# Patient Record
Sex: MALE | Race: WHITE | NOT HISPANIC OR LATINO | URBAN - METROPOLITAN AREA
[De-identification: names, ages, dates, MRNs, and addresses within clinical notes are randomized per-mention and may not be internally consistent; named-entity substitution may affect disease eponyms.]

---

## 2017-03-31 ENCOUNTER — INPATIENT (INPATIENT)
Facility: HOSPITAL | Age: 57
LOS: 9 days | Discharge: HOME | End: 2017-04-10
Attending: SURGERY

## 2017-04-03 ENCOUNTER — APPOINTMENT (OUTPATIENT)
Dept: VASCULAR SURGERY | Facility: HOSPITAL | Age: 57
End: 2017-04-03

## 2017-04-04 PROBLEM — Z00.00 ENCOUNTER FOR PREVENTIVE HEALTH EXAMINATION: Status: ACTIVE | Noted: 2017-04-04

## 2017-04-05 ENCOUNTER — APPOINTMENT (OUTPATIENT)
Dept: VASCULAR SURGERY | Facility: HOSPITAL | Age: 57
End: 2017-04-05

## 2017-04-25 ENCOUNTER — APPOINTMENT (OUTPATIENT)
Dept: VASCULAR SURGERY | Facility: CLINIC | Age: 57
End: 2017-04-25

## 2017-04-25 VITALS
SYSTOLIC BLOOD PRESSURE: 124 MMHG | BODY MASS INDEX: 31.92 KG/M2 | HEIGHT: 71 IN | WEIGHT: 228 LBS | DIASTOLIC BLOOD PRESSURE: 74 MMHG

## 2017-04-25 DIAGNOSIS — Z86.79 PERSONAL HISTORY OF OTHER DISEASES OF THE CIRCULATORY SYSTEM: ICD-10-CM

## 2017-04-25 DIAGNOSIS — Z78.9 OTHER SPECIFIED HEALTH STATUS: ICD-10-CM

## 2017-04-25 DIAGNOSIS — Z86.39 PERSONAL HISTORY OF OTHER ENDOCRINE, NUTRITIONAL AND METABOLIC DISEASE: ICD-10-CM

## 2017-04-25 DIAGNOSIS — I25.10 ATHEROSCLEROTIC HEART DISEASE OF NATIVE CORONARY ARTERY W/OUT ANGINA PECTORIS: ICD-10-CM

## 2017-04-25 DIAGNOSIS — Z83.3 FAMILY HISTORY OF DIABETES MELLITUS: ICD-10-CM

## 2017-04-25 RX ORDER — CLOPIDOGREL BISULFATE 75 MG/1
75 TABLET, FILM COATED ORAL
Refills: 0 | Status: ACTIVE | COMMUNITY

## 2017-05-09 ENCOUNTER — APPOINTMENT (OUTPATIENT)
Dept: VASCULAR SURGERY | Facility: CLINIC | Age: 57
End: 2017-05-09

## 2017-05-19 ENCOUNTER — OUTPATIENT (OUTPATIENT)
Dept: OUTPATIENT SERVICES | Facility: HOSPITAL | Age: 57
LOS: 1 days | Discharge: HOME | End: 2017-05-19

## 2017-06-01 ENCOUNTER — INPATIENT (INPATIENT)
Facility: HOSPITAL | Age: 57
LOS: 0 days | Discharge: HOME | End: 2017-06-02
Attending: SURGERY

## 2017-06-01 ENCOUNTER — APPOINTMENT (OUTPATIENT)
Dept: VASCULAR SURGERY | Facility: HOSPITAL | Age: 57
End: 2017-06-01

## 2017-06-01 DIAGNOSIS — I73.9 PERIPHERAL VASCULAR DISEASE, UNSPECIFIED: ICD-10-CM

## 2017-06-01 DIAGNOSIS — I65.29 OCCLUSION AND STENOSIS OF UNSPECIFIED CAROTID ARTERY: ICD-10-CM

## 2017-06-01 DIAGNOSIS — I10 ESSENTIAL (PRIMARY) HYPERTENSION: ICD-10-CM

## 2017-06-01 DIAGNOSIS — E11.621 TYPE 2 DIABETES MELLITUS WITH FOOT ULCER: ICD-10-CM

## 2017-06-01 DIAGNOSIS — I25.10 ATHEROSCLEROTIC HEART DISEASE OF NATIVE CORONARY ARTERY WITHOUT ANGINA PECTORIS: ICD-10-CM

## 2017-06-01 DIAGNOSIS — E11.9 TYPE 2 DIABETES MELLITUS WITHOUT COMPLICATIONS: ICD-10-CM

## 2017-06-20 ENCOUNTER — APPOINTMENT (OUTPATIENT)
Dept: VASCULAR SURGERY | Facility: CLINIC | Age: 57
End: 2017-06-20

## 2017-06-27 ENCOUNTER — APPOINTMENT (OUTPATIENT)
Dept: VASCULAR SURGERY | Facility: CLINIC | Age: 57
End: 2017-06-27

## 2017-06-28 DIAGNOSIS — Z79.02 LONG TERM (CURRENT) USE OF ANTITHROMBOTICS/ANTIPLATELETS: ICD-10-CM

## 2017-06-28 DIAGNOSIS — I65.21 OCCLUSION AND STENOSIS OF RIGHT CAROTID ARTERY: ICD-10-CM

## 2017-06-28 DIAGNOSIS — Z86.73 PERSONAL HISTORY OF TRANSIENT ISCHEMIC ATTACK (TIA), AND CEREBRAL INFARCTION WITHOUT RESIDUAL DEFICITS: ICD-10-CM

## 2017-06-28 DIAGNOSIS — Z79.82 LONG TERM (CURRENT) USE OF ASPIRIN: ICD-10-CM

## 2017-06-28 DIAGNOSIS — I73.9 PERIPHERAL VASCULAR DISEASE, UNSPECIFIED: ICD-10-CM

## 2017-06-28 DIAGNOSIS — I25.10 ATHEROSCLEROTIC HEART DISEASE OF NATIVE CORONARY ARTERY WITHOUT ANGINA PECTORIS: ICD-10-CM

## 2017-06-28 DIAGNOSIS — I25.2 OLD MYOCARDIAL INFARCTION: ICD-10-CM

## 2017-06-28 DIAGNOSIS — Z79.4 LONG TERM (CURRENT) USE OF INSULIN: ICD-10-CM

## 2017-06-28 DIAGNOSIS — E78.5 HYPERLIPIDEMIA, UNSPECIFIED: ICD-10-CM

## 2017-06-28 DIAGNOSIS — Z89.432 ACQUIRED ABSENCE OF LEFT FOOT: ICD-10-CM

## 2017-06-28 DIAGNOSIS — E83.42 HYPOMAGNESEMIA: ICD-10-CM

## 2017-06-28 DIAGNOSIS — E11.51 TYPE 2 DIABETES MELLITUS WITH DIABETIC PERIPHERAL ANGIOPATHY WITHOUT GANGRENE: ICD-10-CM

## 2017-06-28 DIAGNOSIS — I10 ESSENTIAL (PRIMARY) HYPERTENSION: ICD-10-CM

## 2017-06-28 DIAGNOSIS — Z95.1 PRESENCE OF AORTOCORONARY BYPASS GRAFT: ICD-10-CM

## 2017-06-28 DIAGNOSIS — Z98.890 OTHER SPECIFIED POSTPROCEDURAL STATES: ICD-10-CM

## 2017-07-25 ENCOUNTER — APPOINTMENT (OUTPATIENT)
Dept: VASCULAR SURGERY | Facility: CLINIC | Age: 57
End: 2017-07-25

## 2017-07-25 VITALS — HEIGHT: 71 IN | BODY MASS INDEX: 31.92 KG/M2 | WEIGHT: 228 LBS

## 2017-10-10 ENCOUNTER — APPOINTMENT (OUTPATIENT)
Dept: VASCULAR SURGERY | Facility: CLINIC | Age: 57
End: 2017-10-10

## 2017-10-31 ENCOUNTER — APPOINTMENT (OUTPATIENT)
Dept: VASCULAR SURGERY | Facility: CLINIC | Age: 57
End: 2017-10-31
Payer: COMMERCIAL

## 2017-10-31 VITALS — DIASTOLIC BLOOD PRESSURE: 70 MMHG | WEIGHT: 210 LBS | BODY MASS INDEX: 29.29 KG/M2 | SYSTOLIC BLOOD PRESSURE: 130 MMHG

## 2017-10-31 PROCEDURE — 99213 OFFICE O/P EST LOW 20 MIN: CPT

## 2017-10-31 PROCEDURE — 93926 LOWER EXTREMITY STUDY: CPT

## 2018-01-30 ENCOUNTER — APPOINTMENT (OUTPATIENT)
Dept: VASCULAR SURGERY | Facility: CLINIC | Age: 58
End: 2018-01-30
Payer: COMMERCIAL

## 2018-01-30 VITALS
BODY MASS INDEX: 29.4 KG/M2 | SYSTOLIC BLOOD PRESSURE: 140 MMHG | DIASTOLIC BLOOD PRESSURE: 80 MMHG | HEIGHT: 71 IN | WEIGHT: 210 LBS

## 2018-01-30 PROCEDURE — 93880 EXTRACRANIAL BILAT STUDY: CPT

## 2018-01-30 PROCEDURE — 99213 OFFICE O/P EST LOW 20 MIN: CPT

## 2018-01-30 PROCEDURE — 93926 LOWER EXTREMITY STUDY: CPT

## 2018-04-05 ENCOUNTER — OUTPATIENT (OUTPATIENT)
Dept: OUTPATIENT SERVICES | Facility: HOSPITAL | Age: 58
LOS: 1 days | Discharge: HOME | End: 2018-04-05

## 2018-04-05 DIAGNOSIS — K76.89 OTHER SPECIFIED DISEASES OF LIVER: ICD-10-CM

## 2018-04-05 DIAGNOSIS — N39.0 URINARY TRACT INFECTION, SITE NOT SPECIFIED: ICD-10-CM

## 2018-04-05 DIAGNOSIS — E78.00 PURE HYPERCHOLESTEROLEMIA, UNSPECIFIED: ICD-10-CM

## 2018-04-05 DIAGNOSIS — E03.9 HYPOTHYROIDISM, UNSPECIFIED: ICD-10-CM

## 2018-04-05 DIAGNOSIS — E11.65 TYPE 2 DIABETES MELLITUS WITH HYPERGLYCEMIA: ICD-10-CM

## 2018-04-05 DIAGNOSIS — I10 ESSENTIAL (PRIMARY) HYPERTENSION: ICD-10-CM

## 2018-04-05 DIAGNOSIS — E55.9 VITAMIN D DEFICIENCY, UNSPECIFIED: ICD-10-CM

## 2018-04-05 DIAGNOSIS — E78.5 HYPERLIPIDEMIA, UNSPECIFIED: ICD-10-CM

## 2018-04-05 DIAGNOSIS — D64.9 ANEMIA, UNSPECIFIED: ICD-10-CM

## 2018-04-24 ENCOUNTER — APPOINTMENT (OUTPATIENT)
Dept: VASCULAR SURGERY | Facility: CLINIC | Age: 58
End: 2018-04-24
Payer: COMMERCIAL

## 2018-04-24 VITALS
HEIGHT: 71 IN | SYSTOLIC BLOOD PRESSURE: 132 MMHG | DIASTOLIC BLOOD PRESSURE: 84 MMHG | WEIGHT: 210 LBS | BODY MASS INDEX: 29.4 KG/M2

## 2018-04-24 PROCEDURE — 93925 LOWER EXTREMITY STUDY: CPT

## 2018-04-24 PROCEDURE — 99213 OFFICE O/P EST LOW 20 MIN: CPT

## 2018-07-03 DIAGNOSIS — I65.23 OCCLUSION AND STENOSIS OF BILATERAL CAROTID ARTERIES: ICD-10-CM

## 2018-07-03 DIAGNOSIS — Z01.818 ENCOUNTER FOR OTHER PREPROCEDURAL EXAMINATION: ICD-10-CM

## 2018-08-14 ENCOUNTER — APPOINTMENT (OUTPATIENT)
Dept: UROLOGY | Facility: CLINIC | Age: 58
End: 2018-08-14

## 2018-08-21 ENCOUNTER — APPOINTMENT (OUTPATIENT)
Dept: VASCULAR SURGERY | Facility: CLINIC | Age: 58
End: 2018-08-21
Payer: COMMERCIAL

## 2018-08-21 PROCEDURE — 93926 LOWER EXTREMITY STUDY: CPT

## 2018-08-21 PROCEDURE — 99213 OFFICE O/P EST LOW 20 MIN: CPT

## 2018-08-23 ENCOUNTER — OUTPATIENT (OUTPATIENT)
Dept: OUTPATIENT SERVICES | Facility: HOSPITAL | Age: 58
LOS: 1 days | Discharge: HOME | End: 2018-08-23

## 2018-08-23 DIAGNOSIS — E78.5 HYPERLIPIDEMIA, UNSPECIFIED: ICD-10-CM

## 2018-08-23 DIAGNOSIS — E11.65 TYPE 2 DIABETES MELLITUS WITH HYPERGLYCEMIA: ICD-10-CM

## 2018-08-23 DIAGNOSIS — E78.00 PURE HYPERCHOLESTEROLEMIA, UNSPECIFIED: ICD-10-CM

## 2018-08-23 DIAGNOSIS — I10 ESSENTIAL (PRIMARY) HYPERTENSION: ICD-10-CM

## 2018-08-23 DIAGNOSIS — D64.9 ANEMIA, UNSPECIFIED: ICD-10-CM

## 2018-08-23 DIAGNOSIS — N39.0 URINARY TRACT INFECTION, SITE NOT SPECIFIED: ICD-10-CM

## 2018-08-23 DIAGNOSIS — E55.9 VITAMIN D DEFICIENCY, UNSPECIFIED: ICD-10-CM

## 2018-08-25 ENCOUNTER — INPATIENT (INPATIENT)
Facility: HOSPITAL | Age: 58
LOS: 2 days | Discharge: HOME | End: 2018-08-28
Attending: INTERNAL MEDICINE | Admitting: INTERNAL MEDICINE
Payer: COMMERCIAL

## 2018-08-25 VITALS
DIASTOLIC BLOOD PRESSURE: 79 MMHG | TEMPERATURE: 97 F | SYSTOLIC BLOOD PRESSURE: 141 MMHG | WEIGHT: 197.31 LBS | HEART RATE: 90 BPM | RESPIRATION RATE: 18 BRPM | OXYGEN SATURATION: 99 %

## 2018-08-25 DIAGNOSIS — Z95.1 PRESENCE OF AORTOCORONARY BYPASS GRAFT: Chronic | ICD-10-CM

## 2018-08-25 LAB
ALBUMIN SERPL ELPH-MCNC: 3.6 G/DL — SIGNIFICANT CHANGE UP (ref 3.5–5.2)
ALP SERPL-CCNC: 111 U/L — SIGNIFICANT CHANGE UP (ref 30–115)
ALT FLD-CCNC: 9 U/L — SIGNIFICANT CHANGE UP (ref 0–41)
ANION GAP SERPL CALC-SCNC: 14 MMOL/L — SIGNIFICANT CHANGE UP (ref 7–14)
APPEARANCE UR: CLEAR — SIGNIFICANT CHANGE UP
APTT BLD: 33.8 SEC — SIGNIFICANT CHANGE UP (ref 27–39.2)
AST SERPL-CCNC: 15 U/L — SIGNIFICANT CHANGE UP (ref 0–41)
BACTERIA # UR AUTO: ABNORMAL /HPF
BASOPHILS # BLD AUTO: 0.08 K/UL — SIGNIFICANT CHANGE UP (ref 0–0.2)
BASOPHILS NFR BLD AUTO: 0.8 % — SIGNIFICANT CHANGE UP (ref 0–1)
BILIRUB SERPL-MCNC: 0.2 MG/DL — SIGNIFICANT CHANGE UP (ref 0.2–1.2)
BILIRUB UR-MCNC: ABNORMAL
BUN SERPL-MCNC: 37 MG/DL — HIGH (ref 10–20)
CALCIUM SERPL-MCNC: 8.6 MG/DL — SIGNIFICANT CHANGE UP (ref 8.5–10.1)
CHLORIDE SERPL-SCNC: 101 MMOL/L — SIGNIFICANT CHANGE UP (ref 98–110)
CK MB CFR SERPL CALC: 3.5 NG/ML — SIGNIFICANT CHANGE UP (ref 0.6–6.3)
CK SERPL-CCNC: 97 U/L — SIGNIFICANT CHANGE UP (ref 0–225)
CO2 SERPL-SCNC: 25 MMOL/L — SIGNIFICANT CHANGE UP (ref 17–32)
COLOR SPEC: YELLOW — SIGNIFICANT CHANGE UP
CREAT SERPL-MCNC: 2 MG/DL — HIGH (ref 0.7–1.5)
DIFF PNL FLD: ABNORMAL
EOSINOPHIL # BLD AUTO: 0.18 K/UL — SIGNIFICANT CHANGE UP (ref 0–0.7)
EOSINOPHIL NFR BLD AUTO: 1.8 % — SIGNIFICANT CHANGE UP (ref 0–8)
GLUCOSE BLDC GLUCOMTR-MCNC: 154 MG/DL — HIGH (ref 70–99)
GLUCOSE SERPL-MCNC: 130 MG/DL — HIGH (ref 70–99)
GLUCOSE UR QL: 100 MG/DL
HCT VFR BLD CALC: 35.6 % — LOW (ref 42–52)
HGB BLD-MCNC: 11.8 G/DL — LOW (ref 14–18)
IMM GRANULOCYTES NFR BLD AUTO: 0.3 % — SIGNIFICANT CHANGE UP (ref 0.1–0.3)
INR BLD: 1.07 RATIO — SIGNIFICANT CHANGE UP (ref 0.65–1.3)
KETONES UR-MCNC: ABNORMAL
LACTATE SERPL-SCNC: 2.2 MMOL/L — SIGNIFICANT CHANGE UP (ref 0.5–2.2)
LEUKOCYTE ESTERASE UR-ACNC: NEGATIVE — SIGNIFICANT CHANGE UP
LYMPHOCYTES # BLD AUTO: 2.29 K/UL — SIGNIFICANT CHANGE UP (ref 1.2–3.4)
LYMPHOCYTES # BLD AUTO: 23.5 % — SIGNIFICANT CHANGE UP (ref 20.5–51.1)
MAGNESIUM SERPL-MCNC: 1.7 MG/DL — LOW (ref 1.8–2.4)
MCHC RBC-ENTMCNC: 28.6 PG — SIGNIFICANT CHANGE UP (ref 27–31)
MCHC RBC-ENTMCNC: 33.1 G/DL — SIGNIFICANT CHANGE UP (ref 32–37)
MCV RBC AUTO: 86.4 FL — SIGNIFICANT CHANGE UP (ref 80–94)
MONOCYTES # BLD AUTO: 0.69 K/UL — HIGH (ref 0.1–0.6)
MONOCYTES NFR BLD AUTO: 7.1 % — SIGNIFICANT CHANGE UP (ref 1.7–9.3)
NEUTROPHILS # BLD AUTO: 6.49 K/UL — SIGNIFICANT CHANGE UP (ref 1.4–6.5)
NEUTROPHILS NFR BLD AUTO: 66.5 % — SIGNIFICANT CHANGE UP (ref 42.2–75.2)
NITRITE UR-MCNC: NEGATIVE — SIGNIFICANT CHANGE UP
NRBC # BLD: 0 /100 WBCS — SIGNIFICANT CHANGE UP (ref 0–0)
PH UR: 5.5 — SIGNIFICANT CHANGE UP (ref 5–8)
PLATELET # BLD AUTO: 214 K/UL — SIGNIFICANT CHANGE UP (ref 130–400)
POTASSIUM SERPL-MCNC: 4.2 MMOL/L — SIGNIFICANT CHANGE UP (ref 3.5–5)
POTASSIUM SERPL-SCNC: 4.2 MMOL/L — SIGNIFICANT CHANGE UP (ref 3.5–5)
PROT SERPL-MCNC: 6.3 G/DL — SIGNIFICANT CHANGE UP (ref 6–8)
PROT UR-MCNC: >=300 MG/DL
PROTHROM AB SERPL-ACNC: 11.6 SEC — SIGNIFICANT CHANGE UP (ref 9.95–12.87)
RBC # BLD: 4.12 M/UL — LOW (ref 4.7–6.1)
RBC # FLD: 12.7 % — SIGNIFICANT CHANGE UP (ref 11.5–14.5)
SODIUM SERPL-SCNC: 140 MMOL/L — SIGNIFICANT CHANGE UP (ref 135–146)
SP GR SPEC: 1.02 — SIGNIFICANT CHANGE UP (ref 1.01–1.03)
TROPONIN T SERPL-MCNC: 0.06 NG/ML — CRITICAL HIGH
UROBILINOGEN FLD QL: 1 MG/DL (ref 0.2–0.2)
WBC # BLD: 9.76 K/UL — SIGNIFICANT CHANGE UP (ref 4.8–10.8)
WBC # FLD AUTO: 9.76 K/UL — SIGNIFICANT CHANGE UP (ref 4.8–10.8)
WBC UR QL: SIGNIFICANT CHANGE UP /HPF

## 2018-08-25 PROCEDURE — 93880 EXTRACRANIAL BILAT STUDY: CPT | Mod: 26

## 2018-08-25 RX ORDER — INSULIN LISPRO 100/ML
VIAL (ML) SUBCUTANEOUS
Qty: 0 | Refills: 0 | Status: DISCONTINUED | OUTPATIENT
Start: 2018-08-25 | End: 2018-08-28

## 2018-08-25 RX ORDER — GLUCAGON INJECTION, SOLUTION 0.5 MG/.1ML
1 INJECTION, SOLUTION SUBCUTANEOUS ONCE
Qty: 0 | Refills: 0 | Status: DISCONTINUED | OUTPATIENT
Start: 2018-08-25 | End: 2018-08-28

## 2018-08-25 RX ORDER — LOSARTAN POTASSIUM 100 MG/1
50 TABLET, FILM COATED ORAL DAILY
Qty: 0 | Refills: 0 | Status: DISCONTINUED | OUTPATIENT
Start: 2018-08-25 | End: 2018-08-28

## 2018-08-25 RX ORDER — ASPIRIN/CALCIUM CARB/MAGNESIUM 324 MG
81 TABLET ORAL DAILY
Qty: 0 | Refills: 0 | Status: DISCONTINUED | OUTPATIENT
Start: 2018-08-25 | End: 2018-08-28

## 2018-08-25 RX ORDER — ATORVASTATIN CALCIUM 80 MG/1
80 TABLET, FILM COATED ORAL AT BEDTIME
Qty: 0 | Refills: 0 | Status: DISCONTINUED | OUTPATIENT
Start: 2018-08-25 | End: 2018-08-25

## 2018-08-25 RX ORDER — ATORVASTATIN CALCIUM 80 MG/1
40 TABLET, FILM COATED ORAL AT BEDTIME
Qty: 0 | Refills: 0 | Status: DISCONTINUED | OUTPATIENT
Start: 2018-08-25 | End: 2018-08-28

## 2018-08-25 RX ORDER — DEXTROSE 50 % IN WATER 50 %
12.5 SYRINGE (ML) INTRAVENOUS ONCE
Qty: 0 | Refills: 0 | Status: DISCONTINUED | OUTPATIENT
Start: 2018-08-25 | End: 2018-08-28

## 2018-08-25 RX ORDER — DEXTROSE 50 % IN WATER 50 %
25 SYRINGE (ML) INTRAVENOUS ONCE
Qty: 0 | Refills: 0 | Status: DISCONTINUED | OUTPATIENT
Start: 2018-08-25 | End: 2018-08-28

## 2018-08-25 RX ORDER — SODIUM CHLORIDE 9 MG/ML
1000 INJECTION, SOLUTION INTRAVENOUS
Qty: 0 | Refills: 0 | Status: DISCONTINUED | OUTPATIENT
Start: 2018-08-25 | End: 2018-08-28

## 2018-08-25 RX ORDER — INSULIN GLARGINE 100 [IU]/ML
27 INJECTION, SOLUTION SUBCUTANEOUS AT BEDTIME
Qty: 0 | Refills: 0 | Status: DISCONTINUED | OUTPATIENT
Start: 2018-08-25 | End: 2018-08-28

## 2018-08-25 RX ORDER — HEPARIN SODIUM 5000 [USP'U]/ML
5000 INJECTION INTRAVENOUS; SUBCUTANEOUS EVERY 8 HOURS
Qty: 0 | Refills: 0 | Status: DISCONTINUED | OUTPATIENT
Start: 2018-08-25 | End: 2018-08-28

## 2018-08-25 RX ORDER — METOPROLOL TARTRATE 50 MG
1 TABLET ORAL
Qty: 0 | Refills: 0 | COMMUNITY

## 2018-08-25 RX ORDER — DEXTROSE 50 % IN WATER 50 %
15 SYRINGE (ML) INTRAVENOUS ONCE
Qty: 0 | Refills: 0 | Status: DISCONTINUED | OUTPATIENT
Start: 2018-08-25 | End: 2018-08-28

## 2018-08-25 RX ORDER — METOPROLOL TARTRATE 50 MG
50 TABLET ORAL
Qty: 0 | Refills: 0 | Status: DISCONTINUED | OUTPATIENT
Start: 2018-08-25 | End: 2018-08-28

## 2018-08-25 RX ORDER — SODIUM CHLORIDE 9 MG/ML
1000 INJECTION INTRAMUSCULAR; INTRAVENOUS; SUBCUTANEOUS
Qty: 0 | Refills: 0 | Status: DISCONTINUED | OUTPATIENT
Start: 2018-08-25 | End: 2018-08-28

## 2018-08-25 RX ORDER — CLOPIDOGREL BISULFATE 75 MG/1
75 TABLET, FILM COATED ORAL DAILY
Qty: 0 | Refills: 0 | Status: DISCONTINUED | OUTPATIENT
Start: 2018-08-25 | End: 2018-08-28

## 2018-08-25 RX ORDER — INSULIN LISPRO 100/ML
9 VIAL (ML) SUBCUTANEOUS
Qty: 0 | Refills: 0 | Status: DISCONTINUED | OUTPATIENT
Start: 2018-08-25 | End: 2018-08-28

## 2018-08-25 RX ADMIN — SODIUM CHLORIDE 125 MILLILITER(S): 9 INJECTION INTRAMUSCULAR; INTRAVENOUS; SUBCUTANEOUS at 21:18

## 2018-08-25 NOTE — H&P ADULT - HISTORY OF PRESENT ILLNESS
58 male patient with PMH of CAD s/p PCI in 2008 and CABG in 2012, DM, HTN, PVD, TIA, CKD presented to the ED for left arm numbness that started on AM of presentation.    History goes back to AM of presentation when the patient woke up with numbness in left upper extremity from the shoulder down to the hand (at around 10AM). Numbness progressively improved and became limited to the left hand, associated with difficulty keeping objects in the hand. Symptoms lasted for about 7-8 hours and resolved spontaneously while in the ED. Patient reports sleeping on his right side.  He denies headaches, visual changes, slurred speech, facial droop, paresthesia or weakness in other extremities, fever, chills, recent viral illness or any other significant symptoms.     In ED, patient found to have new T wave inversions in inferior leads and trop of 0.06. He denies chest pain, shortness of breath or any other cardiac symptoms.    Of note, patient has a history of TIA many years ago when he had left facial droop.

## 2018-08-25 NOTE — ED PROVIDER NOTE - PROGRESS NOTE DETAILS
neuro consulted, spoke to np new ekg changes, twi in inferior leads.  troponin 0.06.  added ck and ckmb.  spoke to cardiology who will eval pt.  neuro np at the bedside as per neuro, can be placed in tele.  endorsed to dr. roblero

## 2018-08-25 NOTE — CONSULT NOTE ADULT - SUBJECTIVE AND OBJECTIVE BOX
CC: Left arm numbness    HPI:   57 yo male with h/o cad, CABG x 4 2012, htn, dm , cva, p.w left arm numbness and weakness. Patient states that he woke up this morning with left arm numbness which resolved within an hour, but the left hand numbness persisted . He states that whole day he kept dropping things out of his left hand and was having problems gripping. His symptoms lasted until 5pm. Patient baseline on arrival to ed. Patient denies speech visual deficits, n/v dizziness, headache, chest pain or sob.     Home medications  metformin 100Omg bid  Levamir  metoprolol 25mg bid  plavix 75 mg   zocor 40mg   asa 81 mg  vit d    Social history   denies alcohol , smoking or drug use      Neuro Exam:  Orientation: oriented to person, place time and situation, speech and language intact  Cranial Nerves: visual acuity intact bilaterally, visual fields full to confrontation, pupils equal round and reactive to light, extraocular motion intact, facial sensation intact symmetrically, face symmetrical, hearing was intact bilaterally, tongue and palate midline, head turning and shoulder shrug symmetric and there was no tongue deviation with protrusion.   Motor: 5/5 through out including left arm  and left hand intrinsics / no drift  Sensory exam: lntact and symmetric  Coordination: .no dysmetria or limb ataxia   gait: deferred    NIHSS: 0    Allergies    No Known Allergies    Intolerances      MEDICATIONS  (STANDING):  sodium chloride 0.9%. 1000 milliLiter(s) (125 mL/Hr) IV Continuous <Continuous>    MEDICATIONS  (PRN):      LABS:                        11.8   9.76  )-----------( 214      ( 25 Aug 2018 20:00 )             35.6     08-25    140  |  101  |  37<H>  ----------------------------<  130<H>  4.2   |  25  |  2.0<H>    Ca    8.6      25 Aug 2018 20:00  Mg     1.7     08-25    TPro  6.3  /  Alb  3.6  /  TBili  0.2  /  DBili  x   /  AST  15  /  ALT  9   /  AlkPhos  111  08-25    PT/INR - ( 25 Aug 2018 20:00 )   PT: 11.60 sec;   INR: 1.07 ratio         PTT - ( 25 Aug 2018 20:00 )  PTT:33.8 sec        Neuro Imaging:  < from: CT Head No Cont (08.25.18 @ 20:04) >  Findings:    The ventricles, basal cisterns and sulcal pattern are mildly prominent   consistent with mild parenchymal volume loss.     There is no acute intracranial hemorrhage, extra-axial fluid collection   or midline shift.     Gray white matter differentiation is maintained.    The calvarium is intact. The visualized paranasal sinuses and mastoid air   cells are unremarkable.        IMPRESSION:     No evidence of acute intracranial pathology.        < end of copied text >    EEG:     Echo:   Carotid Doppler: N/A  Telemetry:

## 2018-08-25 NOTE — ED PROVIDER NOTE - OBJECTIVE STATEMENT
59 yo m with pmh of iddm, htn, hld, cad with cabg, cva? on plavix and asa, presents with c/o left hand numbness.  pt reports woke up at around 9-10am with numbness of the left shoulder down to the hand.  pt says after about an hour the numbness resolved except for the hand.  pt says the hand numbness was accompanied by weakness and noted that he was dropping things that he was holding all throughout the day.  pt admits feels like his  was weaker than normal.  he is right hand dominant.  denies headache, cp, neck pain.  pt denies hand pain or discoloration.  pt now reports that his symptoms are resolved since being in the ED.  pt reports he had a cva around 2005-6 where he had left facial droop which resolved.  does not f/u with neuro.  pmd dr. carlson

## 2018-08-25 NOTE — ED PROVIDER NOTE - MEDICAL DECISION MAKING DETAILS
pt with c/o left hand numbness/weakness, now resolved.  likely tia. pt with c/o left hand numbness/weakness, now resolved.  likely tia.  new twi in inferior leads with mildly elevated troponin, cardio eval for nstemi

## 2018-08-25 NOTE — ED ADULT NURSE NOTE - OBJECTIVE STATEMENT
Patient woke up with L arm numbness radiating to L hand. L hand numbness is still persistent. Patient denies any trauma to left arm and denies chest pain.

## 2018-08-25 NOTE — ED ADULT NURSE NOTE - PSH
H/O arterial bypass of lower limb  Left Lower Extremity  S/P CABG (coronary artery bypass graft)  in 2012

## 2018-08-25 NOTE — CONSULT NOTE ADULT - ASSESSMENT
57 yo male with h/o cad, CABG x 4 2012, htn, dm , cva, p.w left arm numbness and weakness. Patient states that he woke up this morning with left arm numbness which resolved within an hour, but the left hand numbness persisted . He states that whole day he kept dropping things out of his left hand and was having problems gripping. His symptoms lasted until 5pm. Patient baseline on arrival to ed. Current nihss 0. ABCD Score 6.     R/O cva     Plan  admit to telemetry  N/c mri brain  MRA H/N  Continue asa and plavix   continue statin  check cardiac enzymes  check lipid profile and hbaic  echo  call for acute change in neuro status  neuro attending note will follow

## 2018-08-25 NOTE — ED ADULT NURSE NOTE - NSIMPLEMENTINTERV_GEN_ALL_ED
Implemented All Universal Safety Interventions:  [place] to call system. Call bell, personal items and telephone within reach. Instruct patient to call for assistance. Room bathroom lighting operational. Non-slip footwear when patient is off stretcher. Physically safe environment: no spills, clutter or unnecessary equipment. Stretcher in lowest position, wheels locked, appropriate side rails in place.

## 2018-08-25 NOTE — H&P ADULT - ATTENDING COMMENTS
58 male patient with PMH of CAD s/p PCI in 2008 and CABG in 2012, DM, HTN, PVD, TIA, CKD presented to the ED for left arm numbness that started on AM of presentation.    History goes back to AM of presentation when the patient woke up with numbness in left upper extremity from the shoulder down to the hand (at around 10AM). Numbness progressively improved and became limited to the left hand, associated with difficulty keeping objects in the hand. Symptoms lasted for about 7-8 hours and resolved spontaneously while in the ED. Patient reports sleeping on his right side.  He denies headaches, visual changes, slurred speech, facial droop, paresthesia or weakness in other extremities, fever, chills, recent viral illness or any other significant symptoms.     In ED, patient found to have new T wave inversions in inferior leads and trop of 0.06. He denies chest pain, shortness of breath or any other cardiac symptoms.    REVIEW OF SYSTEMS:  CONSTITUTIONAL: Focal weakness, fevers or chills  EYES/ENT: No visual changes;  No vertigo or throat pain   NECK: No pain or stiffness  RESPIRATORY: No cough, wheezing, hemoptysis; No shortness of breath  CARDIOVASCULAR: No chest pain or palpitations  GASTROINTESTINAL: No abdominal or epigastric pain. No nausea, vomiting, or hematemesis; No diarrhea or constipation. No melena or hematochezia.  GENITOURINARY: No dysuria, frequency or hematuria  NEUROLOGICAL: (+) numbness (+) focal weakness- see CC/HPI  SKIN: No itching, rashes    Physical Exam:  General: WN/WD NAD  Neurology: A&Ox3, nonfocal, follows commands  Eyes: PERRLA/ EOMI  ENT/Neck: Neck supple, trachea midline, No JVD  Respiratory: CTA B/L, No wheezing, rales, rhonchi  CV: Normal rate regular rhythm, S1S2, no murmurs, rubs or gallops  Abdominal: Soft, NT, ND +BS,   Extremities: No edema, + peripheral pulses  Skin: No Rashes, Hematoma, Ecchymosis  Incisions: n/a  Tubes:n/a    A/P   TIA - symptoms resolved  -admit to Tele r/o arrhythmia   -serial Neuro checks   - 2D echo and Carotid duplex vs MRA w/ MRI imaging   -ASA/Statin    Elevated trop - no chest pain r/o Arrhythmia r/o NSTEMI ( doubt cardiac event)/h/o CAD  -serial CE and repeat EKG  -Cardiology eval     CKD -stable   - would c/w current Rx ( ARB given DM)    DM - well controlled   -c/w outpatient Rx    DVT prophylaxis

## 2018-08-25 NOTE — H&P ADULT - ASSESSMENT
58 male patient with PMH of CAD s/p PCI in 2008 and CABG in 2012, DM, HTN, PVD, TIA, CKD presented to the ED for left arm numbness that started on AM of presentation. In ED, found to have new T wave inversions and trop of 0.06    # Left upper extremity numbness  - Now resolved  - Neuro exam WNL  - R/O CVA/TIA  - Admit to telemetry  - R/O arrhythmia  - Continue aspirin and plavix  - Continue statin; will switch from simvastatin 40mg to atorvastatin 40mg for now (check lipid profile below)  - Check MRI brain, MRA head and neck (patient claustrophobic, give low dose ativan prior to test)   - Check 2D echo  - Frequent neuro checks and watch for changes in neurologic status  - HbA1c 6.5% (done on 8/23/2018); LDL 60, HDL 38, Tot Chol 114, Tg 113  - Monitor VS    # Elevated troponins  - Normal CKMB  - No chest pain or any other cardiac symptom  - ACS less likely at this point  - Continue aspirin, plavix, beta-blockers and statins  - Check serial cardiac enzymes and EKG  - As per cardio, if CE increase, start heparin drip (however, contact neurology prior to starting heparin drip)  - Check 2D echo  - F/U with cardiology (Dr. Magdaleno)    # History of CAD  - s/p PCI in 2008 and CABG in 2012  - Continue aspirin, plavix, beta-blockers and statins      # CKD  - Creatinine 2.0 today; was 1.8 2 days ago  - Will continue current medications; if patient meets criteria for JOHN and/or hyperkalemia, stop losartan  - Renally dose medications and avoid nephrotoxic medications  - Monitor BMP    # HTN  - Continue Losartan and metoprolol for now  - Monitor BP    # DM  - Continue insulin  - Monitor FS and adjust insulin accordingly  - Carb consistent diet    # DVT prophylaxis: heparin 5000 units SQ q8h  # DASH/carb consistent diet

## 2018-08-25 NOTE — H&P ADULT - RS GEN PE MLT RESP DETAILS PC
good air movement/no rhonchi/respirations non-labored/no rales/clear to auscultation bilaterally/airway patent/breath sounds equal/no wheezes

## 2018-08-25 NOTE — ED PROVIDER NOTE - PMH
CAD (coronary artery disease)    Diabetes    High cholesterol    S/P CABG (coronary artery bypass graft)    Stroke

## 2018-08-25 NOTE — H&P ADULT - NSHPLABSRESULTS_GEN_ALL_CORE
Labs:                                11.8   9.76  )-----------( 214      ( 25 Aug 2018 20:00 )             35.6       140  |  101  |  37<H>  ----------------------------<  130<H>  4.2   |  25  |  2.0<H>    Ca    8.6      25 Aug 2018 20:00  Mg     1.7         TPro  6.3  /  Alb  3.6  /  TBili  0.2  /  DBili  x   /  AST  15  /  ALT  9   /  AlkPhos  111      PT/INR - ( 25 Aug 2018 20:00 )   PT: 11.60 sec;   INR: 1.07 ratio       PTT - ( 25 Aug 2018 20:00 )  PTT:33.8 sec    CARDIAC MARKERS ( 25 Aug 2018 21:01 )  x     / x     / 97 U/L / x     / 3.5 ng/mL  CARDIAC MARKERS ( 25 Aug 2018 20:00 )  x     / 0.06 ng/mL / x     / x     / x        LIVER FUNCTIONS - ( 25 Aug 2018 20:00 )  Alb: 3.6 g/dL / Pro: 6.3 g/dL / ALK PHOS: 111 U/L / ALT: 9 U/L / AST: 15 U/L / GGT: x           Urinalysis Basic - ( 25 Aug 2018 20:54 )    Color: Yellow / Appearance: Clear / S.025 / pH: x  Gluc: x / Ketone: Trace  / Bili: Small / Urobili: 1.0 mg/dL   Blood: x / Protein: >=300 mg/dL / Nitrite: Negative   Leuk Esterase: Negative / RBC: x / WBC 1-2 /HPF   Sq Epi: x / Non Sq Epi: x / Bacteria: Few /HPF

## 2018-08-25 NOTE — CONSULT NOTE ADULT - ATTENDING COMMENTS
Patient seen and examined agree with above except as noted.  Patient awoke yesterday with numbness and weakness in left hand.  Said numbness improved over half hour but weakness lasted almost 12 hours.  Denies HA, neck pain, dysarthria, diplopia    Exam   CN 2-12 normal  A+Ox3 language normal  power 5/5 except in intrinsic hand muscles of left hand, with atrophy in thenar muscles  Sensory unremarkable to PP, Temp, Vib  Tinel (-)  DTR 1+ in UE and 0 in LE  FTN NL  Gait normal    NIHSS 0    A/P  Patient with likely peripheral nerve irritation after sleeping.  Symptoms improved however has atrophy in distribution of left hand  1. Out patient EMG/NCS of upper extremities  2. No need for stroke workup  3. Educated about symptoms to be cautious of in order to return to ED (i.e. stroke symptoms)

## 2018-08-25 NOTE — ED ADULT NURSE REASSESSMENT NOTE - NS ED NURSE REASSESS COMMENT FT1
Pt verbalized improvement in L hand numbness and reports that is has resolved. Awaiting lab/radiology results. Will continue to monitor.

## 2018-08-25 NOTE — H&P ADULT - NEUROLOGICAL DETAILS
normal strength/sensation intact/cranial nerves intact/alert and oriented x 3/responds to verbal commands

## 2018-08-25 NOTE — H&P ADULT - PMH
CAD (coronary artery disease)    Chronic kidney disease (CKD)    Diabetes mellitus    Hypertension    S/P CABG (coronary artery bypass graft)    Stented coronary artery  in 2008  Transient ischemic attack (TIA)

## 2018-08-25 NOTE — ED PROVIDER NOTE - PHYSICAL EXAMINATION
VITAL SIGNS: I have reviewed nursing notes and confirm.  CONSTITUTIONAL: Well-developed; well-nourished; in no acute distress.  SKIN: Skin exam is warm and dry, no acute rash.  HEAD: Normocephalic; atraumatic.  EYES: PERRL, EOM intact; conjunctiva and sclera clear.  ENT: No nasal discharge; airway clear. TMs clear.  NECK: Supple; non tender.  CARD: S1, S2 normal; no murmurs, gallops, or rubs. Regular rate and rhythm.  RESP: No wheezes, rales or rhonchi.  ABD: Normal bowel sounds; soft; non-distended; non-tender;  EXT: Normal ROM. No clubbing, cyanosis or edema.  NEURO: aox3, cn2-12 grossly normal, 5/5 strength all ext, sensation intact, finger to nose normal, slightly antalgic gait due to previous LLE surgeries (chronic)  PSYCH: Cooperative, appropriate.

## 2018-08-25 NOTE — ED PROVIDER NOTE - CARE PLAN
Principal Discharge DX:	TIA (transient ischemic attack)  Secondary Diagnosis:	NSTEMI (non-ST elevated myocardial infarction)  Secondary Diagnosis:	Elevated troponin

## 2018-08-25 NOTE — H&P ADULT - GASTROINTESTINAL COMMENTS
Patient reports episodes of vomiting attributed to a new medication he started (doesn't recall the name)

## 2018-08-25 NOTE — ED PROVIDER NOTE - NS ED ROS FT
Review of Systems:  	•	CONSTITUTIONAL - no fever, no diaphoresis, no weight change  	•	SKIN - no rash  	•	HEMATOLOGIC - no bleeding, no bruising  	•	EYES - no eye pain, no blurred vision  	•	ENT - no change in hearing, no pain  	•	RESPIRATORY - no shortness of breath, no cough  	•	CARDIAC - no chest pain, no palpitations  	•	GI - no abd pain, no nausea, no vomiting, no diarrhea, no constipation, no bleeding  	•	 - no dysuria, no hematuria, no flank pain, no urinary retention  	•	MUSCULOSKELETAL - no joint paint, no swelling, no redness  	•	NEUROLOGIC - left hand weakness, no headache, left hand paresthesias, no dizziness

## 2018-08-26 DIAGNOSIS — Z95.828 PRESENCE OF OTHER VASCULAR IMPLANTS AND GRAFTS: Chronic | ICD-10-CM

## 2018-08-26 PROBLEM — Z95.5 PRESENCE OF CORONARY ANGIOPLASTY IMPLANT AND GRAFT: Chronic | Status: ACTIVE | Noted: 2018-08-25

## 2018-08-26 LAB
CK MB CFR SERPL CALC: 3.7 NG/ML — SIGNIFICANT CHANGE UP (ref 0.6–6.3)
CK SERPL-CCNC: 94 U/L — SIGNIFICANT CHANGE UP (ref 0–225)
GLUCOSE BLDC GLUCOMTR-MCNC: 136 MG/DL — HIGH (ref 70–99)
GLUCOSE BLDC GLUCOMTR-MCNC: 147 MG/DL — HIGH (ref 70–99)
GLUCOSE BLDC GLUCOMTR-MCNC: 168 MG/DL — HIGH (ref 70–99)
LACTATE SERPL-SCNC: 1.5 MMOL/L — SIGNIFICANT CHANGE UP (ref 0.5–2.2)
TROPONIN T SERPL-MCNC: 0.06 NG/ML — CRITICAL HIGH

## 2018-08-26 RX ADMIN — ATORVASTATIN CALCIUM 40 MILLIGRAM(S): 80 TABLET, FILM COATED ORAL at 00:06

## 2018-08-26 RX ADMIN — ATORVASTATIN CALCIUM 40 MILLIGRAM(S): 80 TABLET, FILM COATED ORAL at 21:31

## 2018-08-26 RX ADMIN — SODIUM CHLORIDE 125 MILLILITER(S): 9 INJECTION INTRAMUSCULAR; INTRAVENOUS; SUBCUTANEOUS at 11:24

## 2018-08-26 RX ADMIN — SODIUM CHLORIDE 125 MILLILITER(S): 9 INJECTION INTRAMUSCULAR; INTRAVENOUS; SUBCUTANEOUS at 08:13

## 2018-08-26 RX ADMIN — Medication 50 MILLIGRAM(S): at 00:06

## 2018-08-26 RX ADMIN — INSULIN GLARGINE 27 UNIT(S): 100 INJECTION, SOLUTION SUBCUTANEOUS at 00:06

## 2018-08-26 RX ADMIN — Medication 50 MILLIGRAM(S): at 08:13

## 2018-08-26 RX ADMIN — Medication 81 MILLIGRAM(S): at 11:14

## 2018-08-26 RX ADMIN — CLOPIDOGREL BISULFATE 75 MILLIGRAM(S): 75 TABLET, FILM COATED ORAL at 11:13

## 2018-08-26 RX ADMIN — Medication 50 MILLIGRAM(S): at 17:32

## 2018-08-26 RX ADMIN — LOSARTAN POTASSIUM 50 MILLIGRAM(S): 100 TABLET, FILM COATED ORAL at 08:13

## 2018-08-26 RX ADMIN — HEPARIN SODIUM 5000 UNIT(S): 5000 INJECTION INTRAVENOUS; SUBCUTANEOUS at 13:21

## 2018-08-26 RX ADMIN — Medication 9 UNIT(S): at 17:32

## 2018-08-26 RX ADMIN — SODIUM CHLORIDE 125 MILLILITER(S): 9 INJECTION INTRAMUSCULAR; INTRAVENOUS; SUBCUTANEOUS at 00:06

## 2018-08-26 RX ADMIN — HEPARIN SODIUM 5000 UNIT(S): 5000 INJECTION INTRAVENOUS; SUBCUTANEOUS at 21:31

## 2018-08-26 RX ADMIN — INSULIN GLARGINE 27 UNIT(S): 100 INJECTION, SOLUTION SUBCUTANEOUS at 21:31

## 2018-08-26 NOTE — CONSULT NOTE ADULT - SUBJECTIVE AND OBJECTIVE BOX
HPI:  58 male patient with PMH of CAD s/p PCI in 2008 and CABG in 2012, DM, HTN, PVD, TIA, CKD presented to the ED for left arm numbness that started on AM of presentation.    History goes back to AM of presentation when the patient woke up with numbness in left upper extremity from the shoulder down to the hand (at around 10AM). Numbness progressively improved and became limited to the left hand, associated with difficulty keeping objects in the hand. Symptoms lasted for about 7-8 hours and resolved spontaneously while in the ED. Patient reports sleeping on his right side.  He denies headaches, visual changes, slurred speech, facial droop, paresthesia or weakness in other extremities, fever, chills, recent viral illness or any other significant symptoms.   In ED, patient found to have new T wave inversions in inferior leads and trop of 0.06. He denies chest pain, shortness of breath or any other cardiac symptoms.    PAST MEDICAL & SURGICAL HISTORY  Stented coronary artery: in 2008  Hypertension  Chronic kidney disease (CKD)  Transient ischemic attack (TIA)  Diabetes mellitus  S/P CABG (coronary artery bypass graft)  CAD (coronary artery disease)  H/O arterial bypass of lower limb: Left Lower Extremity  S/P CABG (coronary artery bypass graft): in 2012      FAMILY HISTORY:  FAMILY HISTORY:  Family history of heart disease (Father)      SOCIAL HISTORY:  [x]ex smoker  []Alcohol  []Drug    ALLERGIES:  No Known Allergies      MEDICATIONS:  MEDICATIONS  (STANDING):  aspirin enteric coated 81 milliGRAM(s) Oral daily  atorvastatin 40 milliGRAM(s) Oral at bedtime  clopidogrel Tablet 75 milliGRAM(s) Oral daily  dextrose 5%. 1000 milliLiter(s) (50 mL/Hr) IV Continuous <Continuous>  dextrose 50% Injectable 12.5 Gram(s) IV Push once  dextrose 50% Injectable 25 Gram(s) IV Push once  dextrose 50% Injectable 25 Gram(s) IV Push once  heparin  Injectable 5000 Unit(s) SubCutaneous every 8 hours  insulin glargine Injectable (LANTUS) 27 Unit(s) SubCutaneous at bedtime  insulin lispro (HumaLOG) corrective regimen sliding scale   SubCutaneous three times a day before meals  insulin lispro Injectable (HumaLOG) 9 Unit(s) SubCutaneous three times a day before meals  LORazepam   Injectable 1 milliGRAM(s) IntraMuscular once  losartan 50 milliGRAM(s) Oral daily  metoprolol tartrate 50 milliGRAM(s) Oral two times a day  sodium chloride 0.9%. 1000 milliLiter(s) (125 mL/Hr) IV Continuous <Continuous>    MEDICATIONS  (PRN):  dextrose 40% Gel 15 Gram(s) Oral once PRN Blood Glucose LESS THAN 70 milliGRAM(s)/deciliter  glucagon  Injectable 1 milliGRAM(s) IntraMuscular once PRN Glucose LESS THAN 70 milligrams/deciliter      HOME MEDICATIONS:  Home Medications:  aspirin 81 mg oral tablet: 1 tab(s) orally once a day (25 Aug 2018 22:58)  Bydureon Pen 2 mg subcutaneous injection, extended release: 2 milligram(s) subcutaneous once a week (25 Aug 2018 22:58)  Levemir 100 units/mL subcutaneous solution: 40 unit(s) subcutaneous once a day (25 Aug 2018 22:58)  losartan 50 mg oral tablet: 1 tab(s) orally once a day (25 Aug 2018 22:58)  metFORMIN 1000 mg oral tablet: 1 tab(s) orally 2 times a day (25 Aug 2018 22:58)  Metoprolol Tartrate 50 mg oral tablet: 1 tab(s) orally 2 times a day (25 Aug 2018 22:58)  Plavix 75 mg oral tablet: 1 tab(s) orally once a day (25 Aug 2018 22:58)  simvastatin 40 mg oral tablet: 1 tab(s) orally once a day (at bedtime) (25 Aug 2018 22:58)      VITALS:   T(F): 97 (08-26 @ 08:42), Max: 97 (08-25 @ 18:56)  HR: 85 (08-26 @ 10:24) (85 - 90)  BP: 185/91 (08-26 @ 10:24) (141/79 - 190/91)  BP(mean): --  RR: 18 (08-26 @ 08:42) (18 - 18)  SpO2: 99% (08-26 @ 08:42) (99% - 99%)    I&O's Summary      REVIEW OF SYSTEMS:  CONSTITUTIONAL: No weakness, fevers or chills  EYES/ENT: No visual changes;  No vertigo or throat pain   NECK: No pain or stiffness  RESPIRATORY: No cough, wheezing, hemoptysis; No shortness of breath  CARDIOVASCULAR: see HPI  GASTROINTESTINAL: No abdominal or epigastric pain. No nausea, vomiting, or hematemesis; No diarrhea or constipation. No melena or hematochezia.  GENITOURINARY: No dysuria, frequency or hematuria  NEUROLOGICAL: see HPI  SKIN: No itching, no rashes    PHYSICAL EXAM:  NEURO: patient is awake , alert and oriented  GEN: Not in acute distress  NECK: no thyroid enlargement  LUNGS: Clear to auscultation bilaterally   CARDIOVASCULAR: S1/S2 present, RRR , no murmus or rubs  ABD: Soft, non-tender, non-distended, +BS  EXT: lilly pitting HARMONY  SKIN: Intact    LABS:                        11.8   9.76  )-----------( 214      ( 25 Aug 2018 20:00 )             35.6     08-25    140  |  101  |  37<H>  ----------------------------<  130<H>  4.2   |  25  |  2.0<H>    Ca    8.6      25 Aug 2018 20:00  Mg     1.7     08-25    TPro  6.3  /  Alb  3.6  /  TBili  0.2  /  DBili  x   /  AST  15  /  ALT  9   /  AlkPhos  111  08-25    PT/INR - ( 25 Aug 2018 20:00 )   PT: 11.60 sec;   INR: 1.07 ratio         PTT - ( 25 Aug 2018 20:00 )  PTT:33.8 sec  Creatine Kinase, Serum: 94 U/L (08-25-18 @ 23:50)  Troponin T, Serum: 0.06 ng/mL <HH> (08-25-18 @ 23:50)  Lactate, Blood: 1.5 mmol/L (08-25-18 @ 23:50)  Creatine Kinase, Serum: 97 U/L (08-25-18 @ 21:01)  Troponin T, Serum: 0.06 ng/mL <HH> (08-25-18 @ 20:00)  Lactate, Blood: 2.2 mmol/L (08-25-18 @ 20:00)    CARDIAC MARKERS ( 25 Aug 2018 23:50 )  x     / 0.06 ng/mL / 94 U/L / x     / 3.7 ng/mL  CARDIAC MARKERS ( 25 Aug 2018 21:01 )  x     / x     / 97 U/L / x     / 3.5 ng/mL  CARDIAC MARKERS ( 25 Aug 2018 20:00 )  x     / 0.06 ng/mL / x     / x     / x          RADIOLOGY:  -CXR:< from: Xray Chest 1 View-PORTABLE IMMEDIATE (08.25.18 @ 20:28) >  IMPRESSION:     No radiographic evidence of acute cardiopulmonary disease.    < end of copied text >    -  -STRESS TEST: 4/ 2107  1. Fixed defect involving the apical anterior wall moderate in size and   severe   in intensity most likely related to old myocardial infarction. Superimposed   is a   small in size and minimal in intensity stress-induced ischemia.       2. There is hypokinesia and decreased wall thickening in the apical anterior   wall of the left ventricle.     3. LEFT VENTRICULAR EJECTION FRACTION OF 69 % WHICH IS WITHIN RANGE OF   NORMAL.   Patient's prior global left ventricular ejection fraction was 45%, normal   range   50% and above.     ECG:  upon admi : sinus rythm with TWI in inferior leads  today : TWI in lateral leads   TELEMETRY EVENTS:  no events

## 2018-08-26 NOTE — CHART NOTE - NSCHARTNOTEFT_GEN_A_CORE
Upon reviewing prior imaging it was shown that he had carotid dopplers in past showing >80% stenosis of right carotid.  With this additional information would keep in hospital for the possibility of TIA as cause for right hand weakness.    1. Obtain MRI brain w/o JYOTI and MRA Head w/o JYOTI and MRA neck w/w/o JYOTI  2. Plavix 75mg and statin  3. Continue neurochecks Q4 hours  4. If MRI negative then would still do out patient EMG/NCS

## 2018-08-26 NOTE — CONSULT NOTE ADULT - ASSESSMENT
58 male patient with PMH of CAD s/p PCI in 2008 and CABG in 2012, DM, HTN, PVD, TIA, CKD presented to the ED for left arm numbness that started on AM of presentation. cardiology called for elevated trop and ECG changes.  pt is asymptomatic , doubt ACS. serial CE showed stable trop . CKMb negative . Trop most likely elevated from CKD. 58 male patient with PMH of CAD s/p PCI in 2008 and CABG in 2012, DM, HTN, PVD, TIA, CKD presented to the ED for left arm numbness that started on AM of presentation. cardiology called for elevated trop and ECG changes.  pt is asymptomatic , doubt ACS. serial CE showed stable trop . CKMb negative . Trop most likely elevated from CKD.  '  1. Medical treatment for possible NSTEMI  2. Will fu with neurology about possible TIA  3. Will fu with dr cardona

## 2018-08-27 LAB
GLUCOSE BLDC GLUCOMTR-MCNC: 111 MG/DL — HIGH (ref 70–99)
GLUCOSE BLDC GLUCOMTR-MCNC: 132 MG/DL — HIGH (ref 70–99)
GLUCOSE BLDC GLUCOMTR-MCNC: 170 MG/DL — HIGH (ref 70–99)
GLUCOSE BLDC GLUCOMTR-MCNC: 79 MG/DL — SIGNIFICANT CHANGE UP (ref 70–99)

## 2018-08-27 RX ADMIN — SODIUM CHLORIDE 125 MILLILITER(S): 9 INJECTION INTRAMUSCULAR; INTRAVENOUS; SUBCUTANEOUS at 21:42

## 2018-08-27 RX ADMIN — HEPARIN SODIUM 5000 UNIT(S): 5000 INJECTION INTRAVENOUS; SUBCUTANEOUS at 13:00

## 2018-08-27 RX ADMIN — HEPARIN SODIUM 5000 UNIT(S): 5000 INJECTION INTRAVENOUS; SUBCUTANEOUS at 06:05

## 2018-08-27 RX ADMIN — Medication 50 MILLIGRAM(S): at 06:06

## 2018-08-27 RX ADMIN — Medication 9 UNIT(S): at 11:56

## 2018-08-27 RX ADMIN — SODIUM CHLORIDE 125 MILLILITER(S): 9 INJECTION INTRAMUSCULAR; INTRAVENOUS; SUBCUTANEOUS at 10:45

## 2018-08-27 RX ADMIN — CLOPIDOGREL BISULFATE 75 MILLIGRAM(S): 75 TABLET, FILM COATED ORAL at 11:56

## 2018-08-27 RX ADMIN — ATORVASTATIN CALCIUM 40 MILLIGRAM(S): 80 TABLET, FILM COATED ORAL at 21:42

## 2018-08-27 RX ADMIN — LOSARTAN POTASSIUM 50 MILLIGRAM(S): 100 TABLET, FILM COATED ORAL at 06:06

## 2018-08-27 RX ADMIN — Medication 81 MILLIGRAM(S): at 11:56

## 2018-08-27 RX ADMIN — HEPARIN SODIUM 5000 UNIT(S): 5000 INJECTION INTRAVENOUS; SUBCUTANEOUS at 21:41

## 2018-08-27 RX ADMIN — Medication 1 MILLIGRAM(S): at 13:09

## 2018-08-27 RX ADMIN — Medication 50 MILLIGRAM(S): at 17:15

## 2018-08-27 RX ADMIN — Medication 9 UNIT(S): at 17:15

## 2018-08-27 RX ADMIN — INSULIN GLARGINE 27 UNIT(S): 100 INJECTION, SOLUTION SUBCUTANEOUS at 21:41

## 2018-08-27 NOTE — PROGRESS NOTE ADULT - SUBJECTIVE AND OBJECTIVE BOX
INTERVAL HISTORY: No overnight events.  	  MEDICATIONS:  aspirin enteric coated 81 milliGRAM(s) Oral daily  atorvastatin 40 milliGRAM(s) Oral at bedtime  clopidogrel Tablet 75 milliGRAM(s) Oral daily  dextrose 40% Gel 15 Gram(s) Oral once PRN  dextrose 5%. 1000 milliLiter(s) IV Continuous <Continuous>  dextrose 50% Injectable 12.5 Gram(s) IV Push once  dextrose 50% Injectable 25 Gram(s) IV Push once  dextrose 50% Injectable 25 Gram(s) IV Push once  glucagon  Injectable 1 milliGRAM(s) IntraMuscular once PRN  heparin  Injectable 5000 Unit(s) SubCutaneous every 8 hours  insulin glargine Injectable (LANTUS) 27 Unit(s) SubCutaneous at bedtime  insulin lispro (HumaLOG) corrective regimen sliding scale   SubCutaneous three times a day before meals  insulin lispro Injectable (HumaLOG) 9 Unit(s) SubCutaneous three times a day before meals  LORazepam   Injectable 1 milliGRAM(s) IntraMuscular once  losartan 50 milliGRAM(s) Oral daily  metoprolol tartrate 50 milliGRAM(s) Oral two times a day  sodium chloride 0.9%. 1000 milliLiter(s) IV Continuous <Continuous>      REVIEW OF SYSTEMS:  CONSTITUTIONAL: No fever, weight loss, or fatigue  NECK: No pain or stiffness  RESPIRATORY: No cough, wheezing, chills or hemoptysis; No shortness of breath  CARDIOVASCULAR: No chest pain, palpitations, dizziness, or leg swelling  GASTROINTESTINAL: No abdominal or epigastric pain. No nausea, vomiting, or hematemesis; No diarrhea or constipation. No melena or hematochezia.  GENITOURINARY: No dysuria, frequency, hematuria, or incontinence  NEUROLOGICAL: No headaches, memory loss, loss of strength, numbness, or tremors  SKIN: No itching, burning, rashes, or lesions   ENDOCRINE: No heat or cold intolerance; No hair loss  MUSCULOSKELETAL: No joint pain or swelling; No muscle, back, or extremity pain  HEME/LYMPH: No easy bruising, or bleeding gums    PHYSICAL EXAM:  T(C): 36.2 (08-26-18 @ 20:08), Max: 36.4 (08-26-18 @ 13:30)  HR: 88 (08-27-18 @ 06:45) (81 - 89)  BP: 161/86 (08-27-18 @ 06:45) (157/81 - 185/91)  RR: 18 (08-27-18 @ 06:45) (18 - 20)  SpO2: 99% (08-27-18 @ 08:08) (99% - 99%)  Wt(kg): --  I&O's Summary    26 Aug 2018 07:01  -  27 Aug 2018 07:00  --------------------------------------------------------  IN: 2375 mL / OUT: 0 mL / NET: 2375 mL    27 Aug 2018 07:01  -  27 Aug 2018 09:22  --------------------------------------------------------  IN: 375 mL / OUT: 0 mL / NET: 375 mL      Height (cm): 180.34 (08-26 @ 13:30)  Weight (kg): 89.5 (08-26 @ 13:30)  BMI (kg/m2): 27.5 (08-26 @ 13:30)  BSA (m2): 2.1 (08-26 @ 13:30)    GENERAL: NAD, well-groomed, well-developed  HEAD:  Atraumatic, Normocephalic  NECK: Supple, No JVD, Normal thyroid  NERVOUS SYSTEM:  Alert & Oriented X3, Good concentration  CHEST/LUNG: Clear to percussion bilaterally; No rales, rhonchi, wheezing, or rubs  HEART: Regular rate and rhythm; No murmurs, rubs, or gallops  ABDOMEN: Soft, Nontender, Nondistended; Bowel sounds present  EXTREMITIES:  2+ Peripheral Pulses, No clubbing, cyanosis, or edema  SKIN: No rashes or lesions    LABS:	 	    CARDIAC MARKERS ( 25 Aug 2018 23:50 )  x     / 0.06 ng/mL / 94 U/L / x     / 3.7 ng/mL  CARDIAC MARKERS ( 25 Aug 2018 21:01 )  x     / x     / 97 U/L / x     / 3.5 ng/mL  CARDIAC MARKERS ( 25 Aug 2018 20:00 )  x     / 0.06 ng/mL / x     / x     / x                                11.8   9.76  )-----------( 214      ( 25 Aug 2018 20:00 )             35.6     08-25    140  |  101  |  37<H>  ----------------------------<  130<H>  4.2   |  25  |  2.0<H>    Ca    8.6      25 Aug 2018 20:00  Mg     1.7     08-25    TPro  6.3  /  Alb  3.6  /  TBili  0.2  /  DBili  x   /  AST  15  /  ALT  9   /  AlkPhos  111  08-25    proBNP:   Lipid Profile:

## 2018-08-27 NOTE — PROGRESS NOTE ADULT - SUBJECTIVE AND OBJECTIVE BOX
HOSPITALIST ATTENDING NOTE    SCHWABACHER, LAWRENCE  58y Male  [id number]    INTERVAL HPI/OVERNIGHT EVENTS: no events, asymptomatic     T(C): 36.4 (08-27-18 @ 14:29), Max: 36.4 (08-27-18 @ 14:29)  HR: 82 (08-27-18 @ 14:29) (82 - 89)  BP: 176/98 (08-27-18 @ 14:29) (161/86 - 176/98)  RR: 20 (08-27-18 @ 14:29) (18 - 20)  SpO2: 99% (08-27-18 @ 08:08) (99% - 99%)  Wt(kg): --    08-26-18 @ 07:01  -  08-27-18 @ 07:00  --------------------------------------------------------  IN: 2375 mL / OUT: 0 mL / NET: 2375 mL    08-27-18 @ 07:01  -  08-27-18 @ 14:36  --------------------------------------------------------  IN: 750 mL / OUT: 0 mL / NET: 750 mL        PHYSICAL EXAM:  GENERAL: NAD  HEAD:  Atraumatic, Normocephalic  EYES: EOMI, PERRLA, conjunctiva and sclera clear  ENMT: No tonsillar erythema, exudates, or enlargement  NECK: Supple, No JVD, Normal thyroid  NERVOUS SYSTEM:  Alert & Oriented X3, Good concentration; Non-focal- Motor Strength 5/5 B/L upper and lower extremities;  CHEST/LUNG: Clear to ascultation bilaterally; No rales, rhonchi, wheezing,   HEART: Regular rate and rhythm; No murmurs, rubs, or gallops sternotomy scar  ABDOMEN: Soft, Nontender, Nondistended; Bowel sounds present  EXTREMITIES: No clubbing, cyanosis, or edema  LYMPH: No lymphadenopathy noted  SKIN: No rashes or lesions  PSYCH: No suicidal/homicial ideation/hallucinations    Consultant(s) Notes Reviewed:  [x ] YES  [ ] NO  Care Discussed with Consultants/Other Providers/ Housestaff [ x] YES  [ ] NO    LABS:                        11.8   9.76  )-----------( 214      ( 25 Aug 2018 20:00 )             35.6     08-25    140  |  101  |  37<H>  ----------------------------<  130<H>  4.2   |  25  |  2.0<H>    Ca    8.6      25 Aug 2018 20:00  Mg     1.7     08-25    TPro  6.3  /  Alb  3.6  /  TBili  0.2  /  DBili  x   /  AST  15  /  ALT  9   /  AlkPhos  111  08-25          RADIOLOGY & ADDITIONAL TESTS:    Imaging or report Personally Reviewed:  [ ] YES  [ ] NO    Case discussed with resident    Care discussed with pt/family      HEALTH ISSUES - PROBLEM Dx:

## 2018-08-27 NOTE — PROGRESS NOTE ADULT - SUBJECTIVE AND OBJECTIVE BOX
Patient is a 58y old  Male who presents with a chief complaint of Left arm numbness since this morning / R/O CVA/TIA (25 Aug 2018 22:31)      PAST MEDICAL & SURGICAL HISTORY:  Stented coronary artery: in   Hypertension  Chronic kidney disease (CKD)  Transient ischemic attack (TIA)  Diabetes mellitus  S/P CABG (coronary artery bypass graft)  CAD (coronary artery disease)  H/O arterial bypass of lower limb: Left Lower Extremity  S/P CABG (coronary artery bypass graft): in       MEDICATIONS  (STANDING):  aspirin enteric coated 81 milliGRAM(s) Oral daily  atorvastatin 40 milliGRAM(s) Oral at bedtime  clopidogrel Tablet 75 milliGRAM(s) Oral daily  dextrose 5%. 1000 milliLiter(s) (50 mL/Hr) IV Continuous <Continuous>  dextrose 50% Injectable 12.5 Gram(s) IV Push once  dextrose 50% Injectable 25 Gram(s) IV Push once  dextrose 50% Injectable 25 Gram(s) IV Push once  heparin  Injectable 5000 Unit(s) SubCutaneous every 8 hours  insulin glargine Injectable (LANTUS) 27 Unit(s) SubCutaneous at bedtime  insulin lispro (HumaLOG) corrective regimen sliding scale   SubCutaneous three times a day before meals  insulin lispro Injectable (HumaLOG) 9 Unit(s) SubCutaneous three times a day before meals  losartan 50 milliGRAM(s) Oral daily  metoprolol tartrate 50 milliGRAM(s) Oral two times a day  sodium chloride 0.9%. 1000 milliLiter(s) (125 mL/Hr) IV Continuous <Continuous>    MEDICATIONS  (PRN):  dextrose 40% Gel 15 Gram(s) Oral once PRN Blood Glucose LESS THAN 70 milliGRAM(s)/deciliter  glucagon  Injectable 1 milliGRAM(s) IntraMuscular once PRN Glucose LESS THAN 70 milligrams/deciliter        Vital Signs Last 24 Hrs  T(C): 36.4 (27 Aug 2018 14:29), Max: 36.4 (27 Aug 2018 14:29)  T(F): 97.5 (27 Aug 2018 14:29), Max: 97.5 (27 Aug 2018 14:29)  HR: 82 (27 Aug 2018 14:29) (82 - 89)  BP: 176/98 (27 Aug 2018 14:29) (161/86 - 176/98)  BP(mean): --  RR: 20 (27 Aug 2018 14:29) (18 - 20)  SpO2: 99% (27 Aug 2018 08:08) (99% - 99%)  CAPILLARY BLOOD GLUCOSE      POCT Blood Glucose.: 132 mg/dL (27 Aug 2018 16:43)  POCT Blood Glucose.: 111 mg/dL (27 Aug 2018 11:43)  POCT Blood Glucose.: 79 mg/dL (27 Aug 2018 07:40)  POCT Blood Glucose.: 168 mg/dL (26 Aug 2018 20:56)    I&O's Summary    26 Aug 2018 07:  -  27 Aug 2018 07:00  --------------------------------------------------------  IN: 2375 mL / OUT: 0 mL / NET: 2375 mL    27 Aug 2018 07:01  -  27 Aug 2018 20:02  --------------------------------------------------------  IN: 1125 mL / OUT: 0 mL / NET: 1125 mL        Physical Exam:    GENERAL: NAD  HEAD:  Atraumatic, Normocephalic  EYES: EOMI, PERRLA, conjunctiva and sclera clear  ENMT: No tonsillar erythema, exudates, or enlargement  NECK: Supple, No JVD, Normal thyroid  NERVOUS SYSTEM:  Alert & Oriented X3, Good concentration; Non-focal- Motor Strength 5/5 B/L upper and lower extremities;  CHEST/LUNG: Clear to ascultation bilaterally; No rales, rhonchi, wheezing,   HEART: Regular rate and rhythm; No murmurs, rubs, or gallops sternotomy scar  ABDOMEN: Soft, Nontender, Nondistended; Bowel sounds present  EXTREMITIES: No clubbing, cyanosis, or edema  LYMPH: No lymphadenopathy noted              Labs:                            CARDIAC MARKERS ( 25 Aug 2018 23:50 )  x     / 0.06 ng/mL / 94 U/L / x     / 3.7 ng/mL  CARDIAC MARKERS ( 25 Aug 2018 21:01 )  x     / x     / 97 U/L / x     / 3.5 ng/mL        Urinalysis Basic - ( 25 Aug 2018 20:54 )    Color: Yellow / Appearance: Clear / S.025 / pH: x  Gluc: x / Ketone: Trace  / Bili: Small / Urobili: 1.0 mg/dL   Blood: x / Protein: >=300 mg/dL / Nitrite: Negative   Leuk Esterase: Negative / RBC: x / WBC 1-2 /HPF   Sq Epi: x / Non Sq Epi: x / Bacteria: Few /HPF

## 2018-08-27 NOTE — PROGRESS NOTE ADULT - ASSESSMENT
58 male patient with PMH of CAD s/p PCI in 2008 and CABG in 2012, DM, HTN, PVD, TIA, CKD presented to the ED for left arm numbness that started on AM of presentation.    History goes back to AM of presentation when the patient woke up with numbness in left upper extremity from the shoulder down to the hand (at around 10AM). Numbness progressively improved and became limited to the left hand, associated with difficulty keeping objects in the hand. Symptoms lasted for about 7-8 hours and resolved spontaneously while in the ED. Patient reports sleeping on his right side.  He denies headaches, visual changes, slurred speech, facial droop, paresthesia or weakness in other extremities, fever, chills, recent viral illness or any other significant symptoms.     In ED, patient found to have new T wave inversions in inferior leads and trop of 0.06. He denies chest pain, shortness of breath or any other cardiac symptoms.    Of note, patient has a history of TIA many years ago when he had left facial droop.      # Left upper extremity numbness  - Now resolved  -neuro exam: WNL       MRI head N/C:   Two small (less than 1 cm) foci of T2/FLAIR/diffusion hyperintense signal   intensity in the right posterior frontal parietal region consistent with   acute ischemic change.    MR angio head: Mild stenosis of the left supraclinoid carotid artery.    MR angio neck:   1.  Left internal carotid artery; origin and proximal, short segment mild   stenosis (less than 50%).  2.  The right internal carotid arteries patent.    - Continue aspirin and plavix  - Continue statin  - Check 2D echo pending   - Frequent neuro checks and watch for changes in neurologic status  - HbA1c 6.5% (done on 8/23/2018); LDL 60, HDL 38, Tot Chol 114, Tg 113  - Monitor VS  -d/w Dr. Galvez, obtain U/S carotids to ensure no stenosis (pt had previous hx of stenosis in R ICA).  clear from neuro point of view if no ICA stenosis     # Elevated troponins  - Normal CKMB  - No chest pain or any other cardiac symptom  - ACS less likely at this point  - Continue aspirin, plavix, beta-blockers and statins  - As per cardio, no intervention   - Check 2D echo  - F/U with cardiology (Dr. Magdaleno)    # History of CAD  - s/p PCI in 2008 and CABG in 2012  - Continue aspirin, plavix, beta-blockers and statins      # CKD stable    # HTN uncontrolled - adjust meds  - Continue Losartan and metoprolol for now  - Monitor BP    # DM  - Continue insulin  - Monitor FS and adjust insulin accordingly  - Carb consistent diet    # DVT prophylaxis: heparin 5000 units SQ q8h  # DASH/carb consistent diet

## 2018-08-27 NOTE — PROGRESS NOTE ADULT - ASSESSMENT
History   CAD CABG   DM presents lt arm numbness    CE stable     denies CHest pain    cont medical therapy   follow office Dr Magdaleno

## 2018-08-27 NOTE — PROGRESS NOTE ADULT - ASSESSMENT
Assessment: 59 yo male with PMH of cad, CABG x 4 2012, htn, dm , TIA, presented with left arm numbness and weakness , which has fully resolved. +U/A. Of note, patient's previous records show carotid stenosis>80% on right side.     Plan:  MRI brain w/o JYOTI pending  MRA head w/o JYOTI pending  MRA  neck w/wo JYOTI pending  continue plavix 75mg  continue lipitor 40  if MRI Negative, outpatient EMG/NCS. Assessment: 57 yo male with PMH of cad, CABG x 4 2012, htn, dm , TIA, presented with left arm numbness and weakness , which has fully resolved. +U/A. Of note, patient's previous records show carotid stenosis>80% on right side.     Plan:  MRI brain w/o JYOTI pending  MRA head w/o JYOTI pending  MRA  neck w/wo JYOTI pending  continue plavix 75mg  continue ASA 81  continue lipitor 40  if MRI Negative, outpatient EMG/NCS.

## 2018-08-27 NOTE — PROGRESS NOTE ADULT - SUBJECTIVE AND OBJECTIVE BOX
Neurology Progress Note    Interval History:      Patient's left arm and hand numbness and weakness has fully resolved. MRI Head, MRA H&N are all pending.     HPI:  58 male patient with PMH of CAD s/p PCI in  and CABG in , DM, HTN, PVD, TIA, CKD presented to the ED for left arm numbness and weakness.   Patient states that he woke up in the  morning with left arm numbness which resolved within an hour, but the left hand numbness persisted . He states that whole day he kept dropping things out of his left hand and was having problems gripping. His symptoms lasted until 5pm. Patient baseline on arrival to ed. Patient denies speech visual deficits, n/v dizziness, headache, chest pain or sob.     In ED, patient found to have new T wave inversions in inferior leads and trop of 0.06. He denies chest pain, shortness of breath or any other cardiac symptoms.    Of note, patient has a history of TIA many years ago when he had left facial droop. (25 Aug 2018 22:31).   Upon reviewing prior imaging it was shown that carotid dopplers in past showing >80% stenosis of right carotid.         PAST MEDICAL & SURGICAL HISTORY:  Stented coronary artery: in   Hypertension  Chronic kidney disease (CKD)  Transient ischemic attack (TIA)  Diabetes mellitus  S/P CABG (coronary artery bypass graft)  CAD (coronary artery disease)  H/O arterial bypass of lower limb: Left Lower Extremity  S/P CABG (coronary artery bypass graft): in           Vital Signs Last 24 Hrs  T(C): 36.2 (26 Aug 2018 20:08), Max: 36.4 (26 Aug 2018 13:30)  T(F): 97.1 (26 Aug 2018 20:08), Max: 97.5 (26 Aug 2018 13:30)  HR: 88 (27 Aug 2018 06:45) (81 - 89)  BP: 161/86 (27 Aug 2018 06:45) (157/81 - 185/91)  BP(mean): --  RR: 18 (27 Aug 2018 06:45) (18 - 20)  SpO2: 99% (27 Aug 2018 08:08) (99% - 99%)    Neurological Exam:   Mental status: Awake, alert and oriented x3.  Recent and remote memory intact.  Naming, repetition and comprehension intact.  Attention/concentration intact.  No dysarthria, no aphasia.  Fund of knowledge appropriate.    Cranial nerves: Pupils equally round and reactive to light, visual fields full, no nystagmus, extraocular muscles intact, V1 through V3 intact bilaterally and symmetric, face symmetric, hearing intact to finger rub, palate elevation symmetric, tongue was midline.  Motor:  MRC grading 5/5 b/l UE/LE.   strength 5/5.  Normal tone and bulk.  No abnormal movements.    Sensation: Intact to light touch, proprioception, and pinprick.   Coordination: No dysmetria on finger-to-nose and heel-to-shin.  No dysdiadokinesia.  Reflexes: 2+ in bilateral UE/LE, downgoing toes bilaterally. (-) Rojo.  Gait: Narrow and steady. No ataxia.  Romberg negative    aspirin enteric coated 81 milliGRAM(s) Oral daily  atorvastatin 40 milliGRAM(s) Oral at bedtime  clopidogrel Tablet 75 milliGRAM(s) Oral daily  dextrose 40% Gel 15 Gram(s) Oral once PRN  dextrose 5%. 1000 milliLiter(s) IV Continuous <Continuous>  dextrose 50% Injectable 12.5 Gram(s) IV Push once  dextrose 50% Injectable 25 Gram(s) IV Push once  dextrose 50% Injectable 25 Gram(s) IV Push once  glucagon  Injectable 1 milliGRAM(s) IntraMuscular once PRN  heparin  Injectable 5000 Unit(s) SubCutaneous every 8 hours  insulin glargine Injectable (LANTUS) 27 Unit(s) SubCutaneous at bedtime  insulin lispro (HumaLOG) corrective regimen sliding scale   SubCutaneous three times a day before meals  insulin lispro Injectable (HumaLOG) 9 Unit(s) SubCutaneous three times a day before meals  LORazepam   Injectable 1 milliGRAM(s) IntraMuscular once  losartan 50 milliGRAM(s) Oral daily  metoprolol tartrate 50 milliGRAM(s) Oral two times a day  sodium chloride 0.9%. 1000 milliLiter(s) IV Continuous <Continuous>      Labs:  CBC Full  -  ( 25 Aug 2018 20:00 )  WBC Count : 9.76 K/uL  Hemoglobin : 11.8 g/dL  Hematocrit : 35.6 %  Platelet Count - Automated : 214 K/uL  Mean Cell Volume : 86.4 fL  Mean Cell Hemoglobin : 28.6 pg  Mean Cell Hemoglobin Concentration : 33.1 g/dL  Auto Neutrophil # : 6.49 K/uL  Auto Lymphocyte # : 2.29 K/uL  Auto Monocyte # : 0.69 K/uL  Auto Eosinophil # : 0.18 K/uL  Auto Basophil # : 0.08 K/uL  Auto Neutrophil % : 66.5 %  Auto Lymphocyte % : 23.5 %  Auto Monocyte % : 7.1 %  Auto Eosinophil % : 1.8 %  Auto Basophil % : 0.8 %        140  |  101  |  37<H>  ----------------------------<  130<H>  4.2   |  25  |  2.0<H>    Ca    8.6      25 Aug 2018 20:00  Mg     1.7         TPro  6.3  /  Alb  3.6  /  TBili  0.2  /  DBili  x   /  AST  15  /  ALT  9   /  AlkPhos  111      LIVER FUNCTIONS - ( 25 Aug 2018 20:00 )  Alb: 3.6 g/dL / Pro: 6.3 g/dL / ALK PHOS: 111 U/L / ALT: 9 U/L / AST: 15 U/L / GGT: x           PT/INR - ( 25 Aug 2018 20:00 )   PT: 11.60 sec;   INR: 1.07 ratio         PTT - ( 25 Aug 2018 20:00 )  PTT:33.8 sec  Urinalysis Basic - ( 25 Aug 2018 20:54 )    Color: Yellow / Appearance: Clear / S.025 / pH: x  Gluc: x / Ketone: Trace  / Bili: Small / Urobili: 1.0 mg/dL   Blood: x / Protein: >=300 mg/dL / Nitrite: Negative   Leuk Esterase: Negative / RBC: x / WBC 1-2 /HPF   Sq Epi: x / Non Sq Epi: x / Bacteria: Few /HPF Neurology Progress Note    Interval History:  Patient's left arm and hand numbness and weakness has fully resolved. MRI Head, MRA H&N are all pending. denies any neck pain but endorses  weakness during numbness on the left.  denies facial asymmetry but prior "TIA" associated with transient L facial droop.    PAST MEDICAL & SURGICAL HISTORY:  Stented coronary artery: in   Hypertension  Chronic kidney disease (CKD)  Transient ischemic attack (TIA)  Diabetes mellitus  S/P CABG (coronary artery bypass graft)  CAD (coronary artery disease)  H/O arterial bypass of lower limb: Left Lower Extremity  S/P CABG (coronary artery bypass graft): in     Vital Signs Last 24 Hrs  T(C): 36.2 (26 Aug 2018 20:08), Max: 36.4 (26 Aug 2018 13:30)  T(F): 97.1 (26 Aug 2018 20:08), Max: 97.5 (26 Aug 2018 13:30)  HR: 88 (27 Aug 2018 06:45) (81 - 89)  BP: 161/86 (27 Aug 2018 06:45) (157/81 - 185/91)  BP(mean): --  RR: 18 (27 Aug 2018 06:45) (18 - 20)  SpO2: 99% (27 Aug 2018 08:08) (99% - 99%)    Neurological Exam:   Mental status: Awake, alert and oriented x3.  Recent and remote memory intact.  Naming, repetition and comprehension intact.  Attention/concentration intact.  No dysarthria, no aphasia.  Fund of knowledge appropriate.    Cranial nerves: Pupils equally round and reactive to light, visual fields full, no nystagmus, extraocular muscles intact, V1 through V3 intact bilaterally and symmetric, hearing intact to finger rub, palate elevation symmetric, tongue was midline. ? R NLF.  Motor:  MRC grading 5/5 b/l UE/LE.   strength 5/5.  Normal tone and bulk.  No abnormal movements.  (+) atrophy L FDI.  No Tinel L elbow.  Sensation: Intact to light touch, proprioception, and pinprick.   Coordination: No dysmetria on finger-to-nose and heel-to-shin.  No dysdiadokinesia.  Reflexes: 2+ in bilateral UE/LE, downgoing toes bilaterally. (-) Rojo.  Gait: Narrow and steady. No ataxia.  Romberg negative    aspirin enteric coated 81 milliGRAM(s) Oral daily  atorvastatin 40 milliGRAM(s) Oral at bedtime  clopidogrel Tablet 75 milliGRAM(s) Oral daily  dextrose 40% Gel 15 Gram(s) Oral once PRN  dextrose 5%. 1000 milliLiter(s) IV Continuous <Continuous>  dextrose 50% Injectable 12.5 Gram(s) IV Push once  dextrose 50% Injectable 25 Gram(s) IV Push once  dextrose 50% Injectable 25 Gram(s) IV Push once  glucagon  Injectable 1 milliGRAM(s) IntraMuscular once PRN  heparin  Injectable 5000 Unit(s) SubCutaneous every 8 hours  insulin glargine Injectable (LANTUS) 27 Unit(s) SubCutaneous at bedtime  insulin lispro (HumaLOG) corrective regimen sliding scale   SubCutaneous three times a day before meals  insulin lispro Injectable (HumaLOG) 9 Unit(s) SubCutaneous three times a day before meals  LORazepam   Injectable 1 milliGRAM(s) IntraMuscular once  losartan 50 milliGRAM(s) Oral daily  metoprolol tartrate 50 milliGRAM(s) Oral two times a day  sodium chloride 0.9%. 1000 milliLiter(s) IV Continuous <Continuous>      Labs:  CBC Full  -  ( 25 Aug 2018 20:00 )  WBC Count : 9.76 K/uL  Hemoglobin : 11.8 g/dL  Hematocrit : 35.6 %  Platelet Count - Automated : 214 K/uL  Mean Cell Volume : 86.4 fL  Mean Cell Hemoglobin : 28.6 pg  Mean Cell Hemoglobin Concentration : 33.1 g/dL  Auto Neutrophil # : 6.49 K/uL  Auto Lymphocyte # : 2.29 K/uL  Auto Monocyte # : 0.69 K/uL  Auto Eosinophil # : 0.18 K/uL  Auto Basophil # : 0.08 K/uL  Auto Neutrophil % : 66.5 %  Auto Lymphocyte % : 23.5 %  Auto Monocyte % : 7.1 %  Auto Eosinophil % : 1.8 %  Auto Basophil % : 0.8 %        140  |  101  |  37<H>  ----------------------------<  130<H>  4.2   |  25  |  2.0<H>    Ca    8.6      25 Aug 2018 20:00  Mg     1.7         TPro  6.3  /  Alb  3.6  /  TBili  0.2  /  DBili  x   /  AST  15  /  ALT  9   /  AlkPhos  111      LIVER FUNCTIONS - ( 25 Aug 2018 20:00 )  Alb: 3.6 g/dL / Pro: 6.3 g/dL / ALK PHOS: 111 U/L / ALT: 9 U/L / AST: 15 U/L / GGT: x           PT/INR - ( 25 Aug 2018 20:00 )   PT: 11.60 sec;   INR: 1.07 ratio         PTT - ( 25 Aug 2018 20:00 )  PTT:33.8 sec  Urinalysis Basic - ( 25 Aug 2018 20:54 )    Color: Yellow / Appearance: Clear / S.025 / pH: x  Gluc: x / Ketone: Trace  / Bili: Small / Urobili: 1.0 mg/dL   Blood: x / Protein: >=300 mg/dL / Nitrite: Negative   Leuk Esterase: Negative / RBC: x / WBC 1-2 /HPF   Sq Epi: x / Non Sq Epi: x / Bacteria: Few /HPF

## 2018-08-27 NOTE — PROGRESS NOTE ADULT - ASSESSMENT
58 male patient with PMH of CAD s/p PCI in 2008 and CABG in 2012, DM, HTN, PVD, TIA, CKD presented to the ED for left arm numbness that started on AM of presentation.    History goes back to AM of presentation when the patient woke up with numbness in left upper extremity from the shoulder down to the hand (at around 10AM). Numbness progressively improved and became limited to the left hand, associated with difficulty keeping objects in the hand. Symptoms lasted for about 7-8 hours and resolved spontaneously while in the ED. Patient reports sleeping on his right side.  He denies headaches, visual changes, slurred speech, facial droop, paresthesia or weakness in other extremities, fever, chills, recent viral illness or any other significant symptoms.     In ED, patient found to have new T wave inversions in inferior leads and trop of 0.06. He denies chest pain, shortness of breath or any other cardiac symptoms.    Of note, patient has a history of TIA many years ago when he had left facial droop.      # Left upper extremity numbness  - Now resolved  - Neuro exam WNL  - R/O CVA/TIA  - Admit to telemetry  - R/O arrhythmia  - Continue aspirin and plavix  - Continue statin; will switch from simvastatin 40mg to atorvastatin 40mg for now (check lipid profile below)  - Check MRI brain, MRA head and neck done - pending reading   - Check 2D echo pending   - Frequent neuro checks and watch for changes in neurologic status  - HbA1c 6.5% (done on 8/23/2018); LDL 60, HDL 38, Tot Chol 114, Tg 113  - Monitor VS    # Elevated troponins  - Normal CKMB  - No chest pain or any other cardiac symptom  - ACS less likely at this point  - Continue aspirin, plavix, beta-blockers and statins  - Check serial cardiac enzymes and EKG  - As per cardio, no intervention   - Check 2D echo  - F/U with cardiology (Dr. Magdaleno)    # History of CAD  - s/p PCI in 2008 and CABG in 2012  - Continue aspirin, plavix, beta-blockers and statins      # CKD stable    # HTN uncontrolled - adjust meds  - Continue Losartan and metoprolol for now  - Monitor BP    # DM  - Continue insulin  - Monitor FS and adjust insulin accordingly  - Carb consistent diet    # DVT prophylaxis: heparin 5000 units SQ q8h  # DASH/carb consistent diet      dispo - home if MRI/A/echo wnl - follow up with neuro

## 2018-08-28 ENCOUNTER — TRANSCRIPTION ENCOUNTER (OUTPATIENT)
Age: 58
End: 2018-08-28

## 2018-08-28 VITALS
DIASTOLIC BLOOD PRESSURE: 91 MMHG | TEMPERATURE: 97 F | SYSTOLIC BLOOD PRESSURE: 172 MMHG | RESPIRATION RATE: 17 BRPM | HEART RATE: 83 BPM

## 2018-08-28 LAB
ALBUMIN SERPL ELPH-MCNC: 3.4 G/DL — LOW (ref 3.5–5.2)
ALP SERPL-CCNC: 93 U/L — SIGNIFICANT CHANGE UP (ref 30–115)
ALT FLD-CCNC: 7 U/L — SIGNIFICANT CHANGE UP (ref 0–41)
ANION GAP SERPL CALC-SCNC: 12 MMOL/L — SIGNIFICANT CHANGE UP (ref 7–14)
AST SERPL-CCNC: 12 U/L — SIGNIFICANT CHANGE UP (ref 0–41)
BILIRUB SERPL-MCNC: 0.2 MG/DL — SIGNIFICANT CHANGE UP (ref 0.2–1.2)
BUN SERPL-MCNC: 29 MG/DL — HIGH (ref 10–20)
CALCIUM SERPL-MCNC: 8.8 MG/DL — SIGNIFICANT CHANGE UP (ref 8.5–10.1)
CHLORIDE SERPL-SCNC: 106 MMOL/L — SIGNIFICANT CHANGE UP (ref 98–110)
CO2 SERPL-SCNC: 22 MMOL/L — SIGNIFICANT CHANGE UP (ref 17–32)
CREAT SERPL-MCNC: 1.7 MG/DL — HIGH (ref 0.7–1.5)
GLUCOSE BLDC GLUCOMTR-MCNC: 103 MG/DL — HIGH (ref 70–99)
GLUCOSE BLDC GLUCOMTR-MCNC: 110 MG/DL — HIGH (ref 70–99)
GLUCOSE BLDC GLUCOMTR-MCNC: 116 MG/DL — HIGH (ref 70–99)
GLUCOSE BLDC GLUCOMTR-MCNC: 83 MG/DL — SIGNIFICANT CHANGE UP (ref 70–99)
GLUCOSE SERPL-MCNC: 90 MG/DL — SIGNIFICANT CHANGE UP (ref 70–99)
HCT VFR BLD CALC: 36.4 % — LOW (ref 42–52)
HGB BLD-MCNC: 12.2 G/DL — LOW (ref 14–18)
MCHC RBC-ENTMCNC: 28.9 PG — SIGNIFICANT CHANGE UP (ref 27–31)
MCHC RBC-ENTMCNC: 33.5 G/DL — SIGNIFICANT CHANGE UP (ref 32–37)
MCV RBC AUTO: 86.3 FL — SIGNIFICANT CHANGE UP (ref 80–94)
NRBC # BLD: 0 /100 WBCS — SIGNIFICANT CHANGE UP (ref 0–0)
PLATELET # BLD AUTO: 204 K/UL — SIGNIFICANT CHANGE UP (ref 130–400)
POTASSIUM SERPL-MCNC: 4.1 MMOL/L — SIGNIFICANT CHANGE UP (ref 3.5–5)
POTASSIUM SERPL-SCNC: 4.1 MMOL/L — SIGNIFICANT CHANGE UP (ref 3.5–5)
PROT SERPL-MCNC: 5.8 G/DL — LOW (ref 6–8)
RBC # BLD: 4.22 M/UL — LOW (ref 4.7–6.1)
RBC # FLD: 12.7 % — SIGNIFICANT CHANGE UP (ref 11.5–14.5)
SODIUM SERPL-SCNC: 140 MMOL/L — SIGNIFICANT CHANGE UP (ref 135–146)
WBC # BLD: 8.99 K/UL — SIGNIFICANT CHANGE UP (ref 4.8–10.8)
WBC # FLD AUTO: 8.99 K/UL — SIGNIFICANT CHANGE UP (ref 4.8–10.8)

## 2018-08-28 RX ORDER — SIMVASTATIN 20 MG/1
1 TABLET, FILM COATED ORAL
Qty: 0 | Refills: 0 | COMMUNITY

## 2018-08-28 RX ORDER — ATORVASTATIN CALCIUM 80 MG/1
1 TABLET, FILM COATED ORAL
Qty: 30 | Refills: 0
Start: 2018-08-28 | End: 2018-09-26

## 2018-08-28 RX ADMIN — Medication 9 UNIT(S): at 11:23

## 2018-08-28 RX ADMIN — Medication 50 MILLIGRAM(S): at 05:37

## 2018-08-28 RX ADMIN — SODIUM CHLORIDE 125 MILLILITER(S): 9 INJECTION INTRAMUSCULAR; INTRAVENOUS; SUBCUTANEOUS at 05:38

## 2018-08-28 RX ADMIN — HEPARIN SODIUM 5000 UNIT(S): 5000 INJECTION INTRAVENOUS; SUBCUTANEOUS at 05:37

## 2018-08-28 RX ADMIN — Medication 50 MILLIGRAM(S): at 17:42

## 2018-08-28 RX ADMIN — Medication 81 MILLIGRAM(S): at 11:24

## 2018-08-28 RX ADMIN — LOSARTAN POTASSIUM 50 MILLIGRAM(S): 100 TABLET, FILM COATED ORAL at 05:37

## 2018-08-28 RX ADMIN — CLOPIDOGREL BISULFATE 75 MILLIGRAM(S): 75 TABLET, FILM COATED ORAL at 11:24

## 2018-08-28 NOTE — PROGRESS NOTE ADULT - ASSESSMENT
Assessment: 57 yo male with PMH of cad, CABG x 4 2012, htn, dm , TIA, presented with left arm numbness and weakness , which has fully resolved. +U/A. MRI shows acute ischemic changes in right posterior frontal parietal region. Patient's symptoms have fully resolved. MRA neck shows <50% stenosis of left ICA.     Plan:  continue plavix 75mg  continue ASA 81  continue lipitor 40 Assessment: 59 yo male with PMH of cad, CABG x 4 2012, htn, dm , TIA, presented with left arm numbness and weakness , which has fully resolved. +U/A. MRI shows acute ischemic changes in right posterior frontal parietal region. Patient's symptoms have fully resolved. MRA neck shows <50% stenosis of left ICA.     Plan:  carotid duplex pending  echo pending  continue plavix 75mg  continue ASA 81  continue lipitor 40 Assessment: 59 yo male with PMH of cad, CABG x 4 2012, htn, dm , TIA p/w acute lacunar strokes R Frontoparietal currently stable with no residual deficits.      Plan:  f/u echo results  if Echo (-), may d/c with outpt followup for event monitor  continue plavix 75mg  continue ASA 81  continue lipitor 40

## 2018-08-28 NOTE — PROGRESS NOTE ADULT - ASSESSMENT
57 y/o man with PMH of CAD s/p PCI in 2008 and CABG in 2012, DM, HTN, PVD, TIA, CKD stage 3 presented to the ED for left arm numbness that started on AM of presentation. In ED, found to have new T wave inversions and trop of 0.06.     1. Acute CVA - continue ASA/Plavix/statin - change atorvastatin to 80mg daily  Neuro f/u today  check 2D echo report and carotid us report  if no significant findings and if cleared by neurology, then pt may be discharged home and f/u with PMD, neurology and cardiology    2. Elevated cardiac enzymes - not ACS per cardiology  medical management and f/u with cardio as outpt    3. CKD stage 3 - stable    4. DM - controlled    5. CAD s/p CABG and PCI - medical therapy    6. HTN - permissive HTN with acute CVA  monitor BP as outpt  on metoprolol and losartan    7. PVD/ h/o right CEA and h/o TIA - continue current medical therapy    Spent 40 minutes on the discharge process of this pt 59 y/o man with PMH of CAD s/p PCI in 2008 and CABG in 2012, DM, HTN, PVD, TIA, CKD stage 3 presented to the ED for left arm numbness that started on AM of presentation. In ED, found to have new T wave inversions and trop of 0.06.     1. Acute CVA - continue ASA/Plavix/statin - change atorvastatin to 80mg daily  Neuro f/u today  check 2D echo report and carotid us report  2D ECHO: EF 53%, mildly increased LV wall thickness, grade I DD, mildly enlarged LA, mild MAC, trace TR, mild aortic root calcification  carotid us: 20-39% stenosis b/l per vascular lab  if cleared by neurology, then pt may be discharged home and f/u with PMD, neurology and cardiology    2. Elevated cardiac enzymes - not ACS per cardiology  medical management and f/u with cardio as outpt    3. CKD stage 3 - stable    4. DM - controlled    5. CAD s/p CABG and PCI - medical therapy    6. HTN - permissive HTN with acute CVA  monitor BP as outpt  on metoprolol and losartan    7. PVD/ h/o right CEA and h/o TIA - continue current medical therapy    Spent 40 minutes on the discharge process of this pt 57 y/o man with PMH of CAD s/p PCI in 2008 and CABG in 2012, DM, HTN, PVD, TIA, CKD stage 3 presented to the ED for left arm numbness that started on AM of presentation. In ED, found to have new T wave inversions and trop of 0.06.     1. Acute CVA - continue ASA/Plavix/atorvastatin 40mg daily  Neuro f/u today  check 2D echo report and carotid us report  2D ECHO: EF 53%, mildly increased LV wall thickness, grade I DD, mildly enlarged LA, mild MAC, trace TR, mild aortic root calcification  carotid us: 20-39% stenosis b/l per vascular lab  if cleared by neurology, then pt may be discharged home and f/u with PMD, neurology and cardiology    2. Elevated cardiac enzymes - not ACS per cardiology  medical management and f/u with cardio as outpt    3. CKD stage 3 - stable    4. DM - controlled    5. CAD s/p CABG and PCI - medical therapy    6. HTN - permissive HTN with acute CVA  monitor BP as outpt  on metoprolol and losartan    7. PVD/ h/o right CEA and h/o TIA - continue current medical therapy    Spent 40 minutes on the discharge process of this pt

## 2018-08-28 NOTE — DISCHARGE NOTE ADULT - CARE PLAN
Principal Discharge DX:	CVA (cerebral vascular accident)  Goal:	clinical stability  Assessment and plan of treatment:	continue aspirin and clopidogrel  switched from simvastatin to atorvastatin  follow up with neurology within 1-2 weeks  Secondary Diagnosis:	Hypertension  Goal:	clinical stability  Assessment and plan of treatment:	continue losartan and metoprolol  follow up with primary care doctor Principal Discharge DX:	CVA (cerebral vascular accident)  Goal:	clinical stability  Assessment and plan of treatment:	acute lacunar strokes right frontoparietal, currently stable with no residual deficits.    continue aspirin and clopidogrel  switched from simvastatin to atorvastatin  follow up with neurology within 1-2 weeks  follow up with cardiology in 1-2 weeks, for consideration of events monitor  Secondary Diagnosis:	Hypertension  Goal:	clinical stability  Assessment and plan of treatment:	continue losartan and metoprolol  follow up with primary care doctor Principal Discharge DX:	CVA (cerebral vascular accident)  Goal:	clinical stability  Assessment and plan of treatment:	acute lacunar strokes right frontoparietal, currently stable with no residual deficits.    continue aspirin and clopidogrel  switched from simvastatin to atorvastatin  follow up with neurology within 2-4 weeks  follow up with cardiology in 1-2 weeks, for consideration of events monitor  Secondary Diagnosis:	Hypertension  Goal:	clinical stability  Assessment and plan of treatment:	continue losartan and metoprolol  follow up with primary care doctor

## 2018-08-28 NOTE — DISCHARGE NOTE ADULT - HOSPITAL COURSE
Woke up on day of admission with numbness in left arm  symptoms lasted 7-8 hours and resolved spontaneously in ER  MRI head showed two small (<1 cm) foci of hyperintense signal consistent with acute ischemic change  MR angio neck: mild stenosis of Left internal carotid artery, R ICA patent  MR angio head: mild stenosis of the left supraclinoid carotid artery  Will continue on aspirin and clopidogrel, switched from simvastatin to atorvastatin  follow up with neurology, cardiology, and primary medical doctor Woke up on day of admission with numbness in left arm  symptoms lasted 7-8 hours and resolved spontaneously in ER  MRI head showed two small (<1 cm) foci of hyperintense signal consistent with acute ischemic change  MR angio neck: mild stenosis of Left internal carotid artery, R ICA patent  MR angio head: mild stenosis of the left supraclinoid carotid artery  echo: 53% EF, grade 1 diastolic dysfunction  Will continue on aspirin and clopidogrel, switched from simvastatin to atorvastatin  follow up with neurology, cardiology, and primary medical doctor Woke up on day of admission with numbness in left arm  symptoms lasted 7-8 hours and resolved spontaneously in ER  MRI head showed two small (<1 cm) foci of hyperintense signal consistent with acute ischemic change  MR angio neck: mild stenosis of Left internal carotid artery, R ICA patent  MR angio head: mild stenosis of the left supraclinoid carotid artery  echo: 53% EF, grade 1 diastolic dysfunction  Will continue on aspirin and clopidogrel, switched from simvastatin to atorvastatin  follow up with neurology in 2-4 weeks , follow up cardiology for consideration of event monitor, and follow up with primary medical doctor

## 2018-08-28 NOTE — PROGRESS NOTE ADULT - SUBJECTIVE AND OBJECTIVE BOX
Discharge note    SCHWABACHER, LAWRENCE  58y Male    INTERVAL HPI/OVERNIGHT EVENTS:    Pt feels well and states left arm numbness and left hand weakness is resolved.  No recurrent events.     T(F): 96 (08-28-18 @ 05:52), Max: 97.5 (08-27-18 @ 14:29)  HR: 86 (08-28-18 @ 05:52) (81 - 86)  BP: 156/98 (08-28-18 @ 06:14) (156/98 - 198/96)  RR: 18 (08-28-18 @ 05:52) (18 - 20)  SpO2: --    I&O's Summary    27 Aug 2018 07:01  -  28 Aug 2018 07:00  --------------------------------------------------------  IN: 1125 mL / OUT: 0 mL / NET: 1125 mL      POCT Blood Glucose.: 116 mg/dL (28 Aug 2018 11:21)  POCT Blood Glucose.: 83 mg/dL (28 Aug 2018 07:22)  POCT Blood Glucose.: 170 mg/dL (27 Aug 2018 21:09)  POCT Blood Glucose.: 132 mg/dL (27 Aug 2018 16:43)  POCT Blood Glucose.: 111 mg/dL (27 Aug 2018 11:43)        PHYSICAL EXAM:  GENERAL: NAD  HEAD:  Normocephalic  EYES:  conjunctiva and sclera clear  ENMT: Moist mucous membranes  NECK: Supple, No JVD  NERVOUS SYSTEM:  Alert & Oriented X3, Good concentration  motor 5/5 b/l, sensation intact, ambulating in the unit, steady gait  good hand  b/l  CHEST/LUNG: Clear to percussion bilaterally; No rales, rhonchi, wheezing  HEART: Regular rate and rhythm; No murmurs  ABDOMEN: Soft, Nontender, Nondistended; Bowel sounds present  EXTREMITIES:   No edema  SKIN: No rashes     Consultant(s) Notes Reviewed:  [x ] YES  [ ] NO  Care Discussed with Consultants/Other Providers [ x] YES  [ ] NO    MEDICATIONS  (STANDING):  aspirin enteric coated 81 milliGRAM(s) Oral daily  atorvastatin 40 milliGRAM(s) Oral at bedtime  clopidogrel Tablet 75 milliGRAM(s) Oral daily  dextrose 5%. 1000 milliLiter(s) (50 mL/Hr) IV Continuous <Continuous>  dextrose 50% Injectable 12.5 Gram(s) IV Push once  dextrose 50% Injectable 25 Gram(s) IV Push once  dextrose 50% Injectable 25 Gram(s) IV Push once  heparin  Injectable 5000 Unit(s) SubCutaneous every 8 hours  insulin glargine Injectable (LANTUS) 27 Unit(s) SubCutaneous at bedtime  insulin lispro (HumaLOG) corrective regimen sliding scale   SubCutaneous three times a day before meals  insulin lispro Injectable (HumaLOG) 9 Unit(s) SubCutaneous three times a day before meals  losartan 50 milliGRAM(s) Oral daily  metoprolol tartrate 50 milliGRAM(s) Oral two times a day  sodium chloride 0.9%. 1000 milliLiter(s) (125 mL/Hr) IV Continuous <Continuous>    MEDICATIONS  (PRN):  dextrose 40% Gel 15 Gram(s) Oral once PRN Blood Glucose LESS THAN 70 milliGRAM(s)/deciliter  glucagon  Injectable 1 milliGRAM(s) IntraMuscular once PRN Glucose LESS THAN 70 milligrams/deciliter    EKG reviewed  Telemetry reviewed    LABS:                        12.2   8.99  )-----------( 204      ( 28 Aug 2018 05:03 )             36.4     08-28    140  |  106  |  29<H>  ----------------------------<  90  4.1   |  22  |  1.7<H>    Ca    8.8      28 Aug 2018 05:03    TPro  5.8<L>  /  Alb  3.4<L>  /  TBili  0.2  /  DBili  x   /  AST  12  /  ALT  7   /  AlkPhos  93  08-28              RADIOLOGY & ADDITIONAL TESTS:    Imaging or report Personally Reviewed:  [ x] YES  [ ] NO    < from: MR Angio Neck w/wo IV Cont (08.27.18 @ 14:28) >  IMPRESSION:    1.  Left internal carotid artery; origin and proximal, short segment mild   stenosis (less than 50%).    2.  The right internal carotid arteries patent.    < end of copied text >    < from: MR Angio Head No Cont (08.27.18 @ 14:02) >  IMPRESSION:    Mild stenosis of the left supraclinoid carotid artery.    < end of copied text >    < from: MR Head No Cont (08.27.18 @ 13:51) >  IMPRESSION:    Two small (less than 1 cm) foci of T2/FLAIR/diffusion hyperintense signal   intensity in the right posterior frontal parietal region consistent with   acute ischemic change.    < end of copied text >    < from: CT Head No Cont (08.25.18 @ 20:04) >  IMPRESSION:     No evidence of acute intracranial pathology.      < end of copied text >    < from: 12 Lead ECG (08.26.18 @ 00:01) >  Diagnosis Line Normal sinus rhythm  Septal infarct , age undetermined  T wave abnormality, consider lateral ischemia  Abnormal ECG    < end of copied text >      Case discussed with resident    Care discussed with pt

## 2018-08-28 NOTE — DISCHARGE NOTE ADULT - MEDICATION SUMMARY - MEDICATIONS TO TAKE
I will START or STAY ON the medications listed below when I get home from the hospital:    aspirin 81 mg oral tablet  -- 1 tab(s) by mouth once a day  -- Indication: For CVA (cerebral vascular accident)    losartan 50 mg oral tablet  -- 1 tab(s) by mouth once a day  -- Indication: For Hypertension    Bydureon Pen 2 mg subcutaneous injection, extended release  -- 2 milligram(s) subcutaneous once a week  -- Indication: For Diabetes mellitus    Levemir 100 units/mL subcutaneous solution  -- 40 unit(s) subcutaneous once a day  -- Indication: For Diabetes mellitus    metFORMIN 1000 mg oral tablet  -- 1 tab(s) by mouth 2 times a day  -- Indication: For Diabetes mellitus    atorvastatin 40 mg oral tablet  -- 1 tab(s) by mouth once a day (at bedtime)  -- Indication: For Dyslipidemia    Plavix 75 mg oral tablet  -- 1 tab(s) by mouth once a day  -- Indication: For CVA (cerebral vascular accident)    Metoprolol Tartrate 50 mg oral tablet  -- 1 tab(s) by mouth 2 times a day  -- Indication: For Hypertension

## 2018-08-28 NOTE — DISCHARGE NOTE ADULT - PROVIDER TOKENS
TOKSAVANNAH:'[id number]' TOKSAVANNAH:'[id number]',TOKSAVANNAH:'[id number]' RACHEL:'[id number]',RACHEL:'[id number]'

## 2018-08-28 NOTE — DISCHARGE NOTE ADULT - CARE PROVIDER_API CALL
Petey Magdaleno), Cardiovascular Disease  [address] [place] [address]  Phone: [phone]  Fax: [phone] Petey Magdaleno), Cardiovascular Disease  [address] [facility]  [address]  [place] [address]  Phone: [phone]  Fax: [phone]    Chepe Galvez), Neurology; Neuromuscular Medicine  [address] [facility] [address]  [place], [place] [id number]  Phone: [phone]  Fax: [phone] Petey Magdaleno), Cardiovascular Disease  [address] [facility]  [address]  [place] [address]  Phone: [phone]  Fax: [phone]    Landen Rogers), EEGEpilepsy; Neurology  [address] [facility]  [address]  [place] [address]  Phone: [phone]  Fax: [phone]

## 2018-08-28 NOTE — DISCHARGE NOTE ADULT - PATIENT PORTAL LINK FT
[address] [facility]  HISTORY AND PHYSICAL      Max NGUYỄN  MR#: [de-identified]  : 1961  ACCOUNT #: [de-identified]   ADMIT DATE: 2018    HISTORY OF PRESENT ILLNESS:  This 68-year-old man has a history of prior coronary bypass grafting with complex subsequent interventions. His internal mammary to the LAD is open. The vein graft to the right coronary system has had multiple stents with distal stents into the distal right coronary artery extending into the PDA and extensive stenting to a large circumflex marginal.  The vein graft to the small marginal has been occluded. His last intervention was  where he had complex stenting to the circumflex and a stent placed distally beyond the insertion of the vein graft. He was feeling well after that procedure. He had non-ST elevation infarct and his last troponin before discharge was 6.31, slowly coming down from 8.1. CPK was 171. He had some nausea and some indigestion, perhaps related to some food eaten on Father's Day. He was feeling okay going to bed tonight and awoke with chest pain very much similar to that experienced when he has had his ischemic syndromes. His EKG here shows evidence of previous inferior infarct with maybe 0.5 mm ST depression in 3 and aVF, some ST depression in aVL compared to . The EKG today shows slightly more ST change in those same leads. We have called in the cath lab. He has not had orthopnea, PND, edema. No presyncope, syncope, palpitations. He remains uncomfortable. PAST MEDICAL HISTORY:  Prior myocardial infarction and bypass, hypertension, hyperlipidemia, glaucoma, diabetes. PAST SURGICAL HISTORY:  He has had wisdom teeth extraction and the cardiac procedures as described. FAMILY HISTORY:  Positive for coronary artery disease. SOCIAL HISTORY:  He continues to smoke. No alcohol. 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     CURRENT MEDICATIONS:  Aspirin 81 mg a day, Farxiga 10 mg a day, Cymbalta 30 mg a day, Repatha 10 subcutaneous q. 30 days, Lasix 40 mg a day, Glucotrol 10 mg twice a day, Lantus 85 units nightly, Bystolic 5 mg daily, prasugrel 10 mg daily, Entresto 24/26 twice a day. ALLERGIES:  NONE KNOWN. REVIEW OF SYSTEMS:  Aside from that discussed in HPI is negative. PHYSICAL EXAMINATION:  GENERAL:  Uncomfortable, but no severe distress. VITAL SIGNS:  Blood pressure 133/87, pulse 68 and regular, afebrile, sats 96% on room air. HEENT:  Sitting up, there is no jugular venous distention. Carotids are full without bruits. Pupils equal, round, react to light and accommodation. Extraocular muscles full without nystagmus. Thyroid not palpable. LUNGS:  Clear. HEART:  Regular rate and rhythm. No murmur, rub, gallop or click. ABDOMEN:  Soft, nontender, without masses or organomegaly. EXTREMITIES:  Without edema. Some chronic venous changes. Pulses are intact. SKIN:  Intact. MUSCULOSKELETAL:  No skeletal deformities. NEUROLOGIC:  Nonfocal.    EKG is described. Hemoglobin 14.9, hematocrit 44.0. White count 10,300, platelets 687,071. Sodium 143, potassium 3.5, chloride 109, CO2 24, BUN 20, creatinine 0.84, troponin 7.61. Chest x-ray, suggestion of mild congestion. PROBLEMS:  1. Acute ischemic coronary syndrome. He may have an element of congestive failure in the setting of complex anatomy and multiplicity of stents following bypass grafting. 2.  Hyperlipidemia. 3.  Hypertension. 4.  Type 2 diabetes mellitus. 5.  Continued cigarette smoking. PLAN:  Proceed with emergent catheterization.       MD ENOC Simon/CE  D: 06/19/2018 04:24     T: 06/19/2018 04:56  JOB #: [id number] You can access the [facility] Patient Portal, offered by [facility], by registering with the following website: [email or web address]

## 2018-08-28 NOTE — DISCHARGE NOTE ADULT - PLAN OF CARE
clinical stability continue aspirin and clopidogrel  switched from simvastatin to atorvastatin  follow up with neurology within 1-2 weeks continue losartan and metoprolol  follow up with primary care doctor acute lacunar strokes right frontoparietal, currently stable with no residual deficits.    continue aspirin and clopidogrel  switched from simvastatin to atorvastatin  follow up with neurology within 1-2 weeks  follow up with cardiology in 1-2 weeks, for consideration of events monitor acute lacunar strokes right frontoparietal, currently stable with no residual deficits.    continue aspirin and clopidogrel  switched from simvastatin to atorvastatin  follow up with neurology within 2-4 weeks  follow up with cardiology in 1-2 weeks, for consideration of events monitor

## 2018-08-28 NOTE — DISCHARGE NOTE ADULT - CARE PROVIDERS DIRECT ADDRESSES
,DirectAddress_Unknown ,DirectAddress_Unknown,[email or web address] ,[email or web address],[email or web address]

## 2018-08-28 NOTE — PROGRESS NOTE ADULT - SUBJECTIVE AND OBJECTIVE BOX
Neurology Progress Note    Interval History:  No acute overnight events. MRI head shows 2 small foci of hyperintense signal intensity in right parietal frontal region. Patient's symptoms have fully resolved. Feels better and wishes to go home.    HPI:  58 male patient with PMH of CAD s/p PCI in 2008 and CABG in 2012, DM, HTN, PVD, TIA, CKD presented to the ED for left arm numbness that started on AM of presentation.    History goes back to AM of presentation when the patient woke up with numbness in left upper extremity from the shoulder down to the hand (at around 10AM). Numbness progressively improved and became limited to the left hand, associated with difficulty keeping objects in the hand. Symptoms lasted for about 7-8 hours and resolved spontaneously while in the ED. Patient reports sleeping on his right side.  He denies headaches, visual changes, slurred speech, facial droop, paresthesia or weakness in other extremities, fever, chills, recent viral illness or any other significant symptoms.     In ED, patient found to have new T wave inversions in inferior leads and trop of 0.06. He denies chest pain, shortness of breath or any other cardiac symptoms.    Of note, patient has a history of TIA many years ago when he had left facial droop. (25 Aug 2018 22:31)      PAST MEDICAL & SURGICAL HISTORY:  Stented coronary artery: in 2008  Hypertension  Chronic kidney disease (CKD)  Transient ischemic attack (TIA)  Diabetes mellitus  S/P CABG (coronary artery bypass graft)  CAD (coronary artery disease)  H/O arterial bypass of lower limb: Left Lower Extremity  S/P CABG (coronary artery bypass graft): in 2012          Vital Signs Last 24 Hrs  T(C): 35.6 (28 Aug 2018 05:52), Max: 36.4 (27 Aug 2018 14:29)  T(F): 96 (28 Aug 2018 05:52), Max: 97.5 (27 Aug 2018 14:29)  HR: 86 (28 Aug 2018 05:52) (81 - 86)  BP: 156/98 (28 Aug 2018 06:14) (156/98 - 198/96)  BP(mean): --  RR: 18 (28 Aug 2018 05:52) (18 - 20)  SpO2: --    Neurological Exam:   Mental status: Awake, alert and oriented x3.  Recent and remote memory intact.  Naming, repetition and comprehension intact.  Attention/concentration intact.  No dysarthria, no aphasia.  Fund of knowledge appropriate.    Cranial nerves: Pupils equally round and reactive to light, visual fields full, no nystagmus, extraocular muscles intact, V1 through V3 intact bilaterally and symmetric, hearing intact to finger rub, palate elevation symmetric, tongue was midline. ? R NLF.  Motor:  MRC grading 5/5 b/l UE/LE.   strength 5/5.  Normal tone and bulk.  No abnormal movements.  (+) atrophy L FDI.  No Tinel L elbow.  Sensation: Intact to light touch, proprioception, and pinprick.   Coordination: No dysmetria on finger-to-nose and heel-to-shin.  No dysdiadokinesia.  Reflexes: 2+ in bilateral UE/LE, downgoing toes bilaterally. (-) Rojo.  Gait: Narrow and steady. No ataxia.  Romberg negative    aspirin enteric coated 81 milliGRAM(s) Oral daily  atorvastatin 40 milliGRAM(s) Oral at bedtime  clopidogrel Tablet 75 milliGRAM(s) Oral daily  dextrose 40% Gel 15 Gram(s) Oral once PRN  dextrose 5%. 1000 milliLiter(s) IV Continuous <Continuous>  dextrose 50% Injectable 12.5 Gram(s) IV Push once  dextrose 50% Injectable 25 Gram(s) IV Push once  dextrose 50% Injectable 25 Gram(s) IV Push once  glucagon  Injectable 1 milliGRAM(s) IntraMuscular once PRN  heparin  Injectable 5000 Unit(s) SubCutaneous every 8 hours  insulin glargine Injectable (LANTUS) 27 Unit(s) SubCutaneous at bedtime  insulin lispro (HumaLOG) corrective regimen sliding scale   SubCutaneous three times a day before meals  insulin lispro Injectable (HumaLOG) 9 Unit(s) SubCutaneous three times a day before meals  losartan 50 milliGRAM(s) Oral daily  metoprolol tartrate 50 milliGRAM(s) Oral two times a day  sodium chloride 0.9%. 1000 milliLiter(s) IV Continuous <Continuous>      Labs:  CBC Full  -  ( 28 Aug 2018 05:03 )  WBC Count : 8.99 K/uL  Hemoglobin : 12.2 g/dL  Hematocrit : 36.4 %  Platelet Count - Automated : 204 K/uL  Mean Cell Volume : 86.3 fL  Mean Cell Hemoglobin : 28.9 pg  Mean Cell Hemoglobin Concentration : 33.5 g/dL  Auto Neutrophil # : x  Auto Lymphocyte # : x  Auto Monocyte # : x  Auto Eosinophil # : x  Auto Basophil # : x  Auto Neutrophil % : x  Auto Lymphocyte % : x  Auto Monocyte % : x  Auto Eosinophil % : x  Auto Basophil % : x    08-28    140  |  106  |  29<H>  ----------------------------<  90  4.1   |  22  |  1.7<H>    Ca    8.8      28 Aug 2018 05:03    TPro  5.8<L>  /  Alb  3.4<L>  /  TBili  0.2  /  DBili  x   /  AST  12  /  ALT  7   /  AlkPhos  93  08-28    LIVER FUNCTIONS - ( 28 Aug 2018 05:03 )  Alb: 3.4 g/dL / Pro: 5.8 g/dL / ALK PHOS: 93 U/L / ALT: 7 U/L / AST: 12 U/L / GGT: x               EXAM:  MR ANGIO NECK WAW IC            PROCEDURE DATE:  08/27/2018            INTERPRETATION:  Clinical History / Reason for exam: Left arm numbness,   history of carotid endarterectomy in April 20, 2018.    MRA OF THE NECK WITHOUT AND WITH CONTRAST      TECHNIQUE:    Using 2D time of flight magnetic resonance angiography multiple images of   the cervical carotid arteries were obtained. Using 3-D time-of-flight   magnetic resonance angiography, multiple images of the carotid   bifurcations wereobtained. Following the intravenous administration of   10 cc of Gadavist, using 3-D time-of-flight magnetic resonance   angiography, multiple images of the cervical carotid arteries were   obtained.     COMPARISON:    CTA of the neck dated August 1,2016.    FINDINGS:    The common carotid arteries, external carotid arteries, and vertebral   arteries are patent.    The right internal carotid arteries patent.    Short segment of mild stenosis (less than 50%) involving the origin and   proximal left internal carotid artery.    IMPRESSION:    1.  Left internal carotid artery; origin and proximal, short segment mild   stenosis (less than 50%).    2.  The right internal carotid arteries patent.    KATHARINE MCKEON M.D., ATTENDING RADIOLOGIST  This document has been electronically signed. Aug 27 2018  4:04PM                EXAM:  MR ANGIO BRAIN            PROCEDURE DATE:  08/27/2018    INTERPRETATION:  Clinical History / Reason for exam: Left arm numbness.    MRA OF THE BRAIN WITHOUT CONTRAST    TECHNIQUE:    Using 3D time of flight magnetic resonance angiography, multiple images   of the proximal intracranial arterial circulation were performed.    FINDINGS:    Mild stenosis of the left supraclinoid carotid artery.    The remainder of the examination demonstrates no focal areas of narrowing   or dilatation of proximal intracranial arterial circulation.    IMPRESSION:    Mild stenosis of the left supraclinoid carotid artery.  KATHARINE MCKEON M.D., ATTENDING RADIOLOGIST  This document has been electronically signed. Aug 27 2018  4:02PM           EXAM:  MR BRAIN            PROCEDURE DATE:  08/27/2018            INTERPRETATION:  Clinical History / Reason for exam: Left arm numbness.    MRI OF THE BRAIN WITHOUT CONTRAST    TECHNIQUE:    Multiplanar multisequence imaging of the brain was performed.    COMPARISON:    Noncontrast CT scan of the brain dated August 25, 2018.    FINDINGS:    Two small (less than 1 cm) foci of T2/FLAIR/diffusion hyperintense signal   intensity in the right posterior frontal parietal region consistent with   acute ischemic change.    The third and lateral ventricles are mildly enlarged as are the cortical   sulci consistent with a mild degree of cortical atrophy. The fourth   ventricle is normal in size and position.    The cerebellar tonsils are minimally low-lying (0.2 cm of cerebellar   ectopia).    Abnormal signal in the right mastoid air cells and left mastoid tip   consistent with inflammation or infection.    IMPRESSION:    Two small (less than 1 cm) foci of T2/FLAIR/diffusion hyperintense signal   intensity in the right posterior frontal parietal region consistent with   acute ischemic change.    Spoke with KATIE PEPPER on 8/27/2018 3:54 PM with readback.          KATHARINE MCKEON M.D., ATTENDING RADIOLOGIST  This document has been electronically signed. Aug 27 2018  4:01PM Neurology Progress Note    Interval History:  No acute overnight events. MRI head shows 2 small foci of hyperintense signal intensity in right parietal frontal region. Patient's symptoms have fully resolved. Feels better and wishes to go home.  Has h/o R ICAS s/p CEA.      PAST MEDICAL & SURGICAL HISTORY:  Stented coronary artery: in 2008  Hypertension  Chronic kidney disease (CKD)  Transient ischemic attack (TIA)  Diabetes mellitus  S/P CABG (coronary artery bypass graft)  CAD (coronary artery disease)  H/O arterial bypass of lower limb: Left Lower Extremity  S/P CABG (coronary artery bypass graft): in 2012    Vital Signs Last 24 Hrs  T(C): 35.6 (28 Aug 2018 05:52), Max: 36.4 (27 Aug 2018 14:29)  T(F): 96 (28 Aug 2018 05:52), Max: 97.5 (27 Aug 2018 14:29)  HR: 86 (28 Aug 2018 05:52) (81 - 86)  BP: 156/98 (28 Aug 2018 06:14) (156/98 - 198/96)  BP(mean): --  RR: 18 (28 Aug 2018 05:52) (18 - 20)  SpO2: --    Neurological Exam:   Mental status: Awake, alert and oriented x3.  Recent and remote memory intact.  Naming, repetition and comprehension intact.  Attention/concentration intact.  No dysarthria, no aphasia.  Fund of knowledge appropriate.    Cranial nerves: Pupils equally round and reactive to light, visual fields full, no nystagmus, extraocular muscles intact, V1 through V3 intact bilaterally and symmetric, hearing intact to finger rub, palate elevation symmetric, tongue was midline. ? R NLF.  Motor:  MRC grading 5/5 b/l UE/LE.   strength 5/5.  Normal tone and bulk.  No abnormal movements.  (+) atrophy L FDI.  No Tinel L elbow.  Sensation: Intact to light touch, proprioception, and pinprick.   Coordination: No dysmetria on finger-to-nose and heel-to-shin.  No dysdiadokinesia.  Reflexes: 2+ in bilateral UE/LE, downgoing toes bilaterally. (-) Rojo.  Gait: Narrow and steady. No ataxia.  Romberg negative    aspirin enteric coated 81 milliGRAM(s) Oral daily  atorvastatin 40 milliGRAM(s) Oral at bedtime  clopidogrel Tablet 75 milliGRAM(s) Oral daily  dextrose 40% Gel 15 Gram(s) Oral once PRN  dextrose 5%. 1000 milliLiter(s) IV Continuous <Continuous>  dextrose 50% Injectable 12.5 Gram(s) IV Push once  dextrose 50% Injectable 25 Gram(s) IV Push once  dextrose 50% Injectable 25 Gram(s) IV Push once  glucagon  Injectable 1 milliGRAM(s) IntraMuscular once PRN  heparin  Injectable 5000 Unit(s) SubCutaneous every 8 hours  insulin glargine Injectable (LANTUS) 27 Unit(s) SubCutaneous at bedtime  insulin lispro (HumaLOG) corrective regimen sliding scale   SubCutaneous three times a day before meals  insulin lispro Injectable (HumaLOG) 9 Unit(s) SubCutaneous three times a day before meals  losartan 50 milliGRAM(s) Oral daily  metoprolol tartrate 50 milliGRAM(s) Oral two times a day  sodium chloride 0.9%. 1000 milliLiter(s) IV Continuous <Continuous>      Labs:  CBC Full  -  ( 28 Aug 2018 05:03 )  WBC Count : 8.99 K/uL  Hemoglobin : 12.2 g/dL  Hematocrit : 36.4 %  Platelet Count - Automated : 204 K/uL  Mean Cell Volume : 86.3 fL  Mean Cell Hemoglobin : 28.9 pg  Mean Cell Hemoglobin Concentration : 33.5 g/dL    08-28    140  |  106  |  29<H>  ----------------------------<  90  4.1   |  22  |  1.7<H>    Ca    8.8      28 Aug 2018 05:03    TPro  5.8<L>  /  Alb  3.4<L>  /  TBili  0.2  /  DBili  x   /  AST  12  /  ALT  7   /  AlkPhos  93  08-28    LIVER FUNCTIONS - ( 28 Aug 2018 05:03 )  Alb: 3.4 g/dL / Pro: 5.8 g/dL / ALK PHOS: 93 U/L / ALT: 7 U/L / AST: 12 U/L / GGT: x           EXAM:  MR ANGIO NECK WAW IC          PROCEDURE DATE:  08/27/2018      INTERPRETATION:  Clinical History / Reason for exam: Left arm numbness,   history of carotid endarterectomy in April 20, 2018.    MRA OF THE NECK WITHOUT AND WITH CONTRAST      TECHNIQUE:    Using 2D time of flight magnetic resonance angiography multiple images of   the cervical carotid arteries were obtained. Using 3-D time-of-flight   magnetic resonance angiography, multiple images of the carotid   bifurcations wereobtained. Following the intravenous administration of   10 cc of Gadavist, using 3-D time-of-flight magnetic resonance   angiography, multiple images of the cervical carotid arteries were   obtained.     COMPARISON:    CTA of the neck dated August 1,2016.    FINDINGS:    The common carotid arteries, external carotid arteries, and vertebral   arteries are patent.    The right internal carotid arteries patent.    Short segment of mild stenosis (less than 50%) involving the origin and   proximal left internal carotid artery.    IMPRESSION:    1.  Left internal carotid artery; origin and proximal, short segment mild   stenosis (less than 50%).    2.  The right internal carotid arteries patent.    KATHARINE MCKEON M.D., ATTENDING RADIOLOGIST  This document has been electronically signed. Aug 27 2018  4:04PM        EXAM:  MR ANGIO BRAIN            PROCEDURE DATE:  08/27/2018    INTERPRETATION:  Clinical History / Reason for exam: Left arm numbness.    MRA OF THE BRAIN WITHOUT CONTRAST    TECHNIQUE:    Using 3D time of flight magnetic resonance angiography, multiple images   of the proximal intracranial arterial circulation were performed.    FINDINGS:    Mild stenosis of the left supraclinoid carotid artery.    The remainder of the examination demonstrates no focal areas of narrowing   or dilatation of proximal intracranial arterial circulation.    IMPRESSION:    Mild stenosis of the left supraclinoid carotid artery.  KATHARINE MCKEON M.D., ATTENDING RADIOLOGIST  This document has been electronically signed. Aug 27 2018  4:02PM    EXAM:  MR BRAIN          PROCEDURE DATE:  08/27/2018        INTERPRETATION:  Clinical History / Reason for exam: Left arm numbness.    MRI OF THE BRAIN WITHOUT CONTRAST    TECHNIQUE:    Multiplanar multisequence imaging of the brain was performed.    COMPARISON:    Noncontrast CT scan of the brain dated August 25, 2018.    FINDINGS:    Two small (less than 1 cm) foci of T2/FLAIR/diffusion hyperintense signal   intensity in the right posterior frontal parietal region consistent with   acute ischemic change.    The third and lateral ventricles are mildly enlarged as are the cortical   sulci consistent with a mild degree of cortical atrophy. The fourth   ventricle is normal in size and position.    The cerebellar tonsils are minimally low-lying (0.2 cm of cerebellar   ectopia).    Abnormal signal in the right mastoid air cells and left mastoid tip   consistent with inflammation or infection.    IMPRESSION:    Two small (less than 1 cm) foci of T2/FLAIR/diffusion hyperintense signal   intensity in the right posterior frontal parietal region consistent with   acute ischemic change.    Spoke with KATIE PEPPER on 8/27/2018 3:54 PM with readback.    KATHARINE MCKEON M.D., ATTENDING RADIOLOGIST  This document has been electronically signed. Aug 27 2018  4:01PM

## 2018-09-04 DIAGNOSIS — I63.9 CEREBRAL INFARCTION, UNSPECIFIED: ICD-10-CM

## 2018-09-04 DIAGNOSIS — E11.22 TYPE 2 DIABETES MELLITUS WITH DIABETIC CHRONIC KIDNEY DISEASE: ICD-10-CM

## 2018-09-04 DIAGNOSIS — Z79.4 LONG TERM (CURRENT) USE OF INSULIN: ICD-10-CM

## 2018-09-04 DIAGNOSIS — I25.10 ATHEROSCLEROTIC HEART DISEASE OF NATIVE CORONARY ARTERY WITHOUT ANGINA PECTORIS: ICD-10-CM

## 2018-09-04 DIAGNOSIS — N18.3 CHRONIC KIDNEY DISEASE, STAGE 3 (MODERATE): ICD-10-CM

## 2018-09-04 DIAGNOSIS — Z95.5 PRESENCE OF CORONARY ANGIOPLASTY IMPLANT AND GRAFT: ICD-10-CM

## 2018-09-04 DIAGNOSIS — E11.51 TYPE 2 DIABETES MELLITUS WITH DIABETIC PERIPHERAL ANGIOPATHY WITHOUT GANGRENE: ICD-10-CM

## 2018-09-04 DIAGNOSIS — Z79.02 LONG TERM (CURRENT) USE OF ANTITHROMBOTICS/ANTIPLATELETS: ICD-10-CM

## 2018-09-04 DIAGNOSIS — I65.21 OCCLUSION AND STENOSIS OF RIGHT CAROTID ARTERY: ICD-10-CM

## 2018-09-04 DIAGNOSIS — I12.9 HYPERTENSIVE CHRONIC KIDNEY DISEASE WITH STAGE 1 THROUGH STAGE 4 CHRONIC KIDNEY DISEASE, OR UNSPECIFIED CHRONIC KIDNEY DISEASE: ICD-10-CM

## 2018-09-04 DIAGNOSIS — Z79.82 LONG TERM (CURRENT) USE OF ASPIRIN: ICD-10-CM

## 2018-09-04 DIAGNOSIS — R74.8 ABNORMAL LEVELS OF OTHER SERUM ENZYMES: ICD-10-CM

## 2018-09-04 DIAGNOSIS — E78.00 PURE HYPERCHOLESTEROLEMIA, UNSPECIFIED: ICD-10-CM

## 2018-09-04 DIAGNOSIS — R20.0 ANESTHESIA OF SKIN: ICD-10-CM

## 2018-09-07 ENCOUNTER — OUTPATIENT (OUTPATIENT)
Dept: OUTPATIENT SERVICES | Facility: HOSPITAL | Age: 58
LOS: 1 days | Discharge: HOME | End: 2018-09-07

## 2018-09-07 DIAGNOSIS — Z95.1 PRESENCE OF AORTOCORONARY BYPASS GRAFT: Chronic | ICD-10-CM

## 2018-09-07 DIAGNOSIS — Z95.828 PRESENCE OF OTHER VASCULAR IMPLANTS AND GRAFTS: Chronic | ICD-10-CM

## 2018-09-07 DIAGNOSIS — B45.9 CRYPTOCOCCOSIS, UNSPECIFIED: ICD-10-CM

## 2018-09-08 PROBLEM — I10 ESSENTIAL (PRIMARY) HYPERTENSION: Chronic | Status: ACTIVE | Noted: 2018-08-25

## 2018-09-08 PROBLEM — E11.9 TYPE 2 DIABETES MELLITUS WITHOUT COMPLICATIONS: Chronic | Status: ACTIVE | Noted: 2018-08-25

## 2018-09-10 DIAGNOSIS — I63.9 CEREBRAL INFARCTION, UNSPECIFIED: ICD-10-CM

## 2018-09-18 DIAGNOSIS — Z89.422 ACQUIRED ABSENCE OF OTHER LEFT TOE(S): ICD-10-CM

## 2018-09-18 DIAGNOSIS — E83.42 HYPOMAGNESEMIA: ICD-10-CM

## 2018-09-18 DIAGNOSIS — M65.872 OTHER SYNOVITIS AND TENOSYNOVITIS, LEFT ANKLE AND FOOT: ICD-10-CM

## 2018-09-18 DIAGNOSIS — Z95.1 PRESENCE OF AORTOCORONARY BYPASS GRAFT: ICD-10-CM

## 2018-09-18 DIAGNOSIS — I10 ESSENTIAL (PRIMARY) HYPERTENSION: ICD-10-CM

## 2018-09-18 DIAGNOSIS — Z95.5 PRESENCE OF CORONARY ANGIOPLASTY IMPLANT AND GRAFT: ICD-10-CM

## 2018-09-18 DIAGNOSIS — Z80.49 FAMILY HISTORY OF MALIGNANT NEOPLASM OF OTHER GENITAL ORGANS: ICD-10-CM
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
## 2018-09-18 DIAGNOSIS — E11.65 TYPE 2 DIABETES MELLITUS WITH HYPERGLYCEMIA: ICD-10-CM

## 2018-09-18 DIAGNOSIS — I25.10 ATHEROSCLEROTIC HEART DISEASE OF NATIVE CORONARY ARTERY WITHOUT ANGINA PECTORIS: ICD-10-CM

## 2018-09-18 DIAGNOSIS — I70.242 ATHEROSCLEROSIS OF NATIVE ARTERIES OF LEFT LEG WITH ULCERATION OF CALF: ICD-10-CM

## 2018-09-18 DIAGNOSIS — E11.42 TYPE 2 DIABETES MELLITUS WITH DIABETIC POLYNEUROPATHY: ICD-10-CM

## 2018-09-18 DIAGNOSIS — Z79.4 LONG TERM (CURRENT) USE OF INSULIN: ICD-10-CM

## 2018-09-18 DIAGNOSIS — N28.9 DISORDER OF KIDNEY AND URETER, UNSPECIFIED: ICD-10-CM

## 2018-09-18 DIAGNOSIS — E11.52 TYPE 2 DIABETES MELLITUS WITH DIABETIC PERIPHERAL ANGIOPATHY WITH GANGRENE: ICD-10-CM

## 2018-09-18 DIAGNOSIS — M00.872 ARTHRITIS DUE TO OTHER BACTERIA, LEFT ANKLE AND FOOT: ICD-10-CM

## 2018-09-18 DIAGNOSIS — Z86.73 PERSONAL HISTORY OF TRANSIENT ISCHEMIC ATTACK (TIA), AND CEREBRAL INFARCTION WITHOUT RESIDUAL DEFICITS: ICD-10-CM

## 2018-09-18 DIAGNOSIS — M86.672 OTHER CHRONIC OSTEOMYELITIS, LEFT ANKLE AND FOOT: ICD-10-CM

## 2018-09-18 DIAGNOSIS — E11.69 TYPE 2 DIABETES MELLITUS WITH OTHER SPECIFIED COMPLICATION: ICD-10-CM
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
## 2018-09-18 DIAGNOSIS — S92.912A UNSPECIFIED FRACTURE OF LEFT TOE(S), INITIAL ENCOUNTER FOR CLOSED FRACTURE: ICD-10-CM
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
## 2018-09-18 DIAGNOSIS — Z80.0 FAMILY HISTORY OF MALIGNANT NEOPLASM OF DIGESTIVE ORGANS: ICD-10-CM

## 2018-09-18 DIAGNOSIS — L97.524 NON-PRESSURE CHRONIC ULCER OF OTHER PART OF LEFT FOOT WITH NECROSIS OF BONE: ICD-10-CM

## 2018-09-25 ENCOUNTER — APPOINTMENT (OUTPATIENT)
Dept: UROLOGY | Facility: CLINIC | Age: 58
End: 2018-09-25
Payer: COMMERCIAL

## 2018-09-25 VITALS
BODY MASS INDEX: 29.4 KG/M2 | DIASTOLIC BLOOD PRESSURE: 84 MMHG | WEIGHT: 210 LBS | HEART RATE: 80 BPM | HEIGHT: 71 IN | SYSTOLIC BLOOD PRESSURE: 171 MMHG

## 2018-09-25 LAB
BILIRUB UR QL STRIP: NORMAL
CLARITY UR: CLEAR
COLLECTION METHOD: NORMAL
GLUCOSE UR-MCNC: 50
HCG UR QL: NORMAL EU/DL
HGB UR QL STRIP.AUTO: NORMAL
KETONES UR-MCNC: NORMAL
LEUKOCYTE ESTERASE UR QL STRIP: NORMAL
NITRITE UR QL STRIP: NORMAL
PH UR STRIP: 6
PROT UR STRIP-MCNC: 500
SP GR UR STRIP: 1.01

## 2018-09-25 PROCEDURE — 99214 OFFICE O/P EST MOD 30 MIN: CPT

## 2018-09-25 PROCEDURE — 81003 URINALYSIS AUTO W/O SCOPE: CPT | Mod: QW

## 2018-10-08 ENCOUNTER — OUTPATIENT (OUTPATIENT)
Dept: OUTPATIENT SERVICES | Facility: HOSPITAL | Age: 58
LOS: 1 days | Discharge: HOME | End: 2018-10-08

## 2018-10-08 DIAGNOSIS — Z95.828 PRESENCE OF OTHER VASCULAR IMPLANTS AND GRAFTS: Chronic | ICD-10-CM

## 2018-10-08 DIAGNOSIS — N40.1 BENIGN PROSTATIC HYPERPLASIA WITH LOWER URINARY TRACT SYMPTOMS: ICD-10-CM

## 2018-10-08 DIAGNOSIS — Z95.1 PRESENCE OF AORTOCORONARY BYPASS GRAFT: Chronic | ICD-10-CM

## 2018-10-09 LAB
PSA FREE FLD-MCNC: 32
PSA FREE SERPL-MCNC: 2.24 NG/ML
PSA SERPL-MCNC: 6.99 NG/ML

## 2018-10-23 ENCOUNTER — APPOINTMENT (OUTPATIENT)
Dept: VASCULAR SURGERY | Facility: CLINIC | Age: 58
End: 2018-10-23

## 2018-11-02 ENCOUNTER — APPOINTMENT (OUTPATIENT)
Dept: UROLOGY | Facility: CLINIC | Age: 58
End: 2018-11-02
Payer: COMMERCIAL

## 2018-11-02 VITALS
HEART RATE: 80 BPM | DIASTOLIC BLOOD PRESSURE: 93 MMHG | HEIGHT: 71 IN | WEIGHT: 210 LBS | BODY MASS INDEX: 29.4 KG/M2 | SYSTOLIC BLOOD PRESSURE: 161 MMHG

## 2018-11-02 PROCEDURE — 99213 OFFICE O/P EST LOW 20 MIN: CPT

## 2019-01-25 ENCOUNTER — OUTPATIENT (OUTPATIENT)
Dept: OUTPATIENT SERVICES | Facility: HOSPITAL | Age: 59
LOS: 1 days | Discharge: HOME | End: 2019-01-25

## 2019-01-25 DIAGNOSIS — E11.9 TYPE 2 DIABETES MELLITUS WITHOUT COMPLICATIONS: ICD-10-CM

## 2019-01-25 DIAGNOSIS — Z95.1 PRESENCE OF AORTOCORONARY BYPASS GRAFT: Chronic | ICD-10-CM

## 2019-01-25 DIAGNOSIS — Z95.828 PRESENCE OF OTHER VASCULAR IMPLANTS AND GRAFTS: Chronic | ICD-10-CM

## 2019-01-25 DIAGNOSIS — N39.0 URINARY TRACT INFECTION, SITE NOT SPECIFIED: ICD-10-CM

## 2019-01-25 DIAGNOSIS — K76.89 OTHER SPECIFIED DISEASES OF LIVER: ICD-10-CM

## 2019-01-25 DIAGNOSIS — N18.2 CHRONIC KIDNEY DISEASE, STAGE 2 (MILD): ICD-10-CM

## 2019-01-25 DIAGNOSIS — E78.00 PURE HYPERCHOLESTEROLEMIA, UNSPECIFIED: ICD-10-CM

## 2019-01-31 ENCOUNTER — LABORATORY RESULT (OUTPATIENT)
Age: 59
End: 2019-01-31

## 2019-01-31 ENCOUNTER — FORM ENCOUNTER (OUTPATIENT)
Age: 59
End: 2019-01-31

## 2019-01-31 ENCOUNTER — APPOINTMENT (OUTPATIENT)
Dept: HEMATOLOGY ONCOLOGY | Facility: CLINIC | Age: 59
End: 2019-01-31

## 2019-01-31 ENCOUNTER — OUTPATIENT (OUTPATIENT)
Dept: OUTPATIENT SERVICES | Facility: HOSPITAL | Age: 59
LOS: 1 days | Discharge: HOME | End: 2019-01-31

## 2019-01-31 VITALS
WEIGHT: 202 LBS | TEMPERATURE: 96.9 F | BODY MASS INDEX: 28.28 KG/M2 | SYSTOLIC BLOOD PRESSURE: 170 MMHG | RESPIRATION RATE: 16 BRPM | HEART RATE: 99 BPM | DIASTOLIC BLOOD PRESSURE: 98 MMHG | HEIGHT: 71 IN

## 2019-01-31 DIAGNOSIS — Z95.1 PRESENCE OF AORTOCORONARY BYPASS GRAFT: Chronic | ICD-10-CM

## 2019-01-31 DIAGNOSIS — Z95.828 PRESENCE OF OTHER VASCULAR IMPLANTS AND GRAFTS: Chronic | ICD-10-CM

## 2019-02-01 ENCOUNTER — OUTPATIENT (OUTPATIENT)
Dept: OUTPATIENT SERVICES | Facility: HOSPITAL | Age: 59
LOS: 1 days | Discharge: HOME | End: 2019-02-01

## 2019-02-01 DIAGNOSIS — Z95.828 PRESENCE OF OTHER VASCULAR IMPLANTS AND GRAFTS: Chronic | ICD-10-CM

## 2019-02-01 DIAGNOSIS — R97.20 ELEVATED PROSTATE SPECIFIC ANTIGEN [PSA]: ICD-10-CM

## 2019-02-01 DIAGNOSIS — Z95.1 PRESENCE OF AORTOCORONARY BYPASS GRAFT: Chronic | ICD-10-CM

## 2019-02-01 LAB
HCT VFR BLD CALC: 35 %
HCYS SERPL-MCNC: 20.6 UMOL/L
HGB BLD-MCNC: 11.8 G/DL
MCHC RBC-ENTMCNC: 29.7 PG
MCHC RBC-ENTMCNC: 33.7 G/DL
MCV RBC AUTO: 88.2 FL
PLATELET # BLD AUTO: 208 K/UL
PMV BLD: 11.8 FL
RBC # BLD: 3.97 M/UL
RBC # FLD: 12.7 %
WBC # FLD AUTO: 7.4 K/UL

## 2019-02-01 NOTE — PHYSICAL EXAM
[Normal] : normal spine exam without palpable tenderness, no kyphosis or scoliosis [de-identified] : slightly pale pleasant male in no acute distress.  [de-identified] : no edema.  [de-identified] : no organomegaly .  [de-identified] : no localizing weakness.

## 2019-02-01 NOTE — HISTORY OF PRESENT ILLNESS
[de-identified] : 58 year old white male referred by Dr Luke Sexton to rule out hypercoagulable state . PMH is significant for hypertension , diabetes , nephrotic range proteinuria, CAD , PAD  Cerebrovascular disease s/p TIA in 2008 with severe right internal carotid stenosis s/p right CEA 2 years ago . In April he was admitted for an episode of left sided numbness and was found with two acute infarcts involving right frontoparietal lobes . He recovered with no significant deficits and was found recently with further rise in creatinine , heavy proteinuria and is awaiting renal and urologic work up , he is on  surveillance for elevated PSA ( around 6 with high free PSA no biopsy or MRI yet ) . He had no recent scans and is scheduled for repeat brain MRI . No history of venous thrombosis . seizures . No FH of thrombophilia. no history of atrial fibrillation .He has been on ASA and plavix for many years. \par System review : He lost 200lbs over several years , no weight loss in last 6 months .

## 2019-02-01 NOTE — ASSESSMENT
[FreeTextEntry1] : 58 year old male with disseminated atherosclerosis ( CAD , CVD ,PAD), CKD with nephrotic range proteinuria ( Diabetic nephropathy ? ) , S/P right CEA with new right cerebral infarcts 8 months ago ( embolic ? ) , no known history of atrial fibrillation , PFO or cardiac source . Mild anemia probably secondary to CKD .  Doubt an underlying thrombophilia except for prostate cancer which was not ruled out yet . will check APL panel , JOSE 2 , MTHFR and homocysteine ( may be elevated from  CKD ) , SPEP , free light chains. PSA Follow up with urology.\par cardiology for JASON , loop recorder to rule out occult atrial fibrillation . \par Discussed with patients , all questions and concerns addressed .

## 2019-02-04 DIAGNOSIS — I63.9 CEREBRAL INFARCTION, UNSPECIFIED: ICD-10-CM

## 2019-02-04 LAB
ALBUMIN MFR SERPL ELPH: 48 %
ALBUMIN SERPL-MCNC: 3.2 G/DL
ALBUMIN/GLOB SERPL: 0.9 RATIO
ALPHA1 GLOB MFR SERPL ELPH: 6 %
ALPHA1 GLOB SERPL ELPH-MCNC: 0.4 G/DL
ALPHA2 GLOB MFR SERPL ELPH: 15.1 %
ALPHA2 GLOB SERPL ELPH-MCNC: 1 G/DL
B-GLOBULIN MFR SERPL ELPH: 13.3 %
B-GLOBULIN SERPL ELPH-MCNC: 0.9 G/DL
B2 GLYCOPROT1 IGA SERPL IA-ACNC: <5 SAU
B2 GLYCOPROT1 IGG SER-ACNC: <5 SGU
B2 GLYCOPROT1 IGM SER-ACNC: <5 SMU
CARDIOLIPIN AB SER IA-ACNC: NEGATIVE
DEPRECATED KAPPA LC FREE/LAMBDA SER: 1.08 RATIO
GAMMA GLOB FLD ELPH-MCNC: 1.2 G/DL
GAMMA GLOB MFR SERPL ELPH: 17.6 %
INTERPRETATION SERPL IEP-IMP: NORMAL
KAPPA LC CSF-MCNC: 8.14 MG/DL
KAPPA LC SERPL-MCNC: 8.82 MG/DL
PROT SERPL-MCNC: 6.6 G/DL
PSA SERPL-MCNC: 7.82 NG/ML

## 2019-02-12 LAB
CONFIRM: NORMAL
DRVVT IMM 1:2 NP PPP: NORMAL
DRVVT SCREEN TO CONFIRM RATIO: 1.03 RATIO
JAK2 GENE MUT ANL BLD/T: NORMAL
SCREEN DRVVT: NORMAL
SILICA CLOTTING TIME INTERPRETATION: NORMAL
SILICA CLOTTING TIME S/C: 1.07 RATIO

## 2019-02-14 ENCOUNTER — OUTPATIENT (OUTPATIENT)
Dept: OUTPATIENT SERVICES | Facility: HOSPITAL | Age: 59
LOS: 1 days | Discharge: HOME | End: 2019-02-14

## 2019-02-14 DIAGNOSIS — Z95.1 PRESENCE OF AORTOCORONARY BYPASS GRAFT: Chronic | ICD-10-CM

## 2019-02-14 DIAGNOSIS — Z95.828 PRESENCE OF OTHER VASCULAR IMPLANTS AND GRAFTS: Chronic | ICD-10-CM

## 2019-02-15 ENCOUNTER — OUTPATIENT (OUTPATIENT)
Dept: OUTPATIENT SERVICES | Facility: HOSPITAL | Age: 59
LOS: 1 days | Discharge: HOME | End: 2019-02-15

## 2019-02-15 DIAGNOSIS — G81.93 HEMIPLEGIA, UNSPECIFIED AFFECTING RIGHT NONDOMINANT SIDE: ICD-10-CM

## 2019-02-15 DIAGNOSIS — Z95.1 PRESENCE OF AORTOCORONARY BYPASS GRAFT: Chronic | ICD-10-CM

## 2019-02-15 DIAGNOSIS — N18.4 CHRONIC KIDNEY DISEASE, STAGE 4 (SEVERE): ICD-10-CM

## 2019-02-15 DIAGNOSIS — Z95.828 PRESENCE OF OTHER VASCULAR IMPLANTS AND GRAFTS: Chronic | ICD-10-CM

## 2019-02-26 ENCOUNTER — APPOINTMENT (OUTPATIENT)
Dept: VASCULAR SURGERY | Facility: CLINIC | Age: 59
End: 2019-02-26
Payer: COMMERCIAL

## 2019-02-26 PROCEDURE — 93926 LOWER EXTREMITY STUDY: CPT

## 2019-02-26 PROCEDURE — 99213 OFFICE O/P EST LOW 20 MIN: CPT

## 2019-02-26 NOTE — HISTORY OF PRESENT ILLNESS
[FreeTextEntry1] : Mr. Schwabacher is a 57 year-old gentleman with PVD and carotid stenosis who underwent left below knee popliteal to AT artery reverse saphenous vein graft in April 2017 for left foot gangrene and right carotid endarterectomy in June 2017.\par \par He presents for follow up. He complaints of left leg tightness.

## 2019-02-26 NOTE — ASSESSMENT
[FreeTextEntry1] : Mr. Schwabacher is a 57 year-old gentleman with PVD and carotid artery stenosis s/p left distal femoral to AT bypass and right CEA. He has a flow disturbance at the distal anastomosis of his lower extremity bypass  on duplex. I will schedule him fore a left leg angiogram possible endovascular revascularization. He will need a carotid duplex upon his next visit.

## 2019-02-26 NOTE — CONSULT LETTER
[Dear  ___] : Dear  [unfilled], [Courtesy Letter:] : I had the pleasure of seeing your patient, [unfilled], in my office today. [Please see my note below.] : Please see my note below. [FreeTextEntry2] : Dr. Pitts

## 2019-02-26 NOTE — DATA REVIEWED
[FreeTextEntry1] : Arterial duplex showed a patent left bk popliteal artery to AT artery saphenous vein graft with a flow disturbance at the distal anastomosis with a velocity  >400cm/s\par

## 2019-02-27 ENCOUNTER — OUTPATIENT (OUTPATIENT)
Dept: OUTPATIENT SERVICES | Facility: HOSPITAL | Age: 59
LOS: 1 days | Discharge: HOME | End: 2019-02-27

## 2019-02-27 DIAGNOSIS — Z95.828 PRESENCE OF OTHER VASCULAR IMPLANTS AND GRAFTS: Chronic | ICD-10-CM

## 2019-02-27 DIAGNOSIS — Z95.1 PRESENCE OF AORTOCORONARY BYPASS GRAFT: Chronic | ICD-10-CM

## 2019-02-27 DIAGNOSIS — N18.4 CHRONIC KIDNEY DISEASE, STAGE 4 (SEVERE): ICD-10-CM

## 2019-03-07 ENCOUNTER — OUTPATIENT (OUTPATIENT)
Dept: OUTPATIENT SERVICES | Facility: HOSPITAL | Age: 59
LOS: 1 days | Discharge: HOME | End: 2019-03-07

## 2019-03-07 VITALS
HEIGHT: 71 IN | WEIGHT: 205.03 LBS | RESPIRATION RATE: 16 BRPM | SYSTOLIC BLOOD PRESSURE: 150 MMHG | OXYGEN SATURATION: 98 % | HEART RATE: 72 BPM | TEMPERATURE: 98 F | DIASTOLIC BLOOD PRESSURE: 94 MMHG

## 2019-03-07 DIAGNOSIS — Z98.890 OTHER SPECIFIED POSTPROCEDURAL STATES: Chronic | ICD-10-CM

## 2019-03-07 DIAGNOSIS — Z01.818 ENCOUNTER FOR OTHER PREPROCEDURAL EXAMINATION: ICD-10-CM

## 2019-03-07 DIAGNOSIS — Z95.828 PRESENCE OF OTHER VASCULAR IMPLANTS AND GRAFTS: Chronic | ICD-10-CM

## 2019-03-07 DIAGNOSIS — I70.209 UNSPECIFIED ATHEROSCLEROSIS OF NATIVE ARTERIES OF EXTREMITIES, UNSPECIFIED EXTREMITY: ICD-10-CM

## 2019-03-07 DIAGNOSIS — Z95.1 PRESENCE OF AORTOCORONARY BYPASS GRAFT: Chronic | ICD-10-CM

## 2019-03-07 LAB
ALBUMIN SERPL ELPH-MCNC: 3.5 G/DL — SIGNIFICANT CHANGE UP (ref 3.5–5.2)
ALP SERPL-CCNC: 133 U/L — HIGH (ref 30–115)
ALT FLD-CCNC: 11 U/L — SIGNIFICANT CHANGE UP (ref 0–41)
ANION GAP SERPL CALC-SCNC: 13 MMOL/L — SIGNIFICANT CHANGE UP (ref 7–14)
APTT BLD: 33.2 SEC — SIGNIFICANT CHANGE UP (ref 27–39.2)
AST SERPL-CCNC: 14 U/L — SIGNIFICANT CHANGE UP (ref 0–41)
BASOPHILS # BLD AUTO: 0.09 K/UL — SIGNIFICANT CHANGE UP (ref 0–0.2)
BASOPHILS NFR BLD AUTO: 0.9 % — SIGNIFICANT CHANGE UP (ref 0–1)
BILIRUB SERPL-MCNC: <0.2 MG/DL — SIGNIFICANT CHANGE UP (ref 0.2–1.2)
BUN SERPL-MCNC: 49 MG/DL — HIGH (ref 10–20)
CALCIUM SERPL-MCNC: 8.3 MG/DL — LOW (ref 8.5–10.1)
CHLORIDE SERPL-SCNC: 106 MMOL/L — SIGNIFICANT CHANGE UP (ref 98–110)
CO2 SERPL-SCNC: 21 MMOL/L — SIGNIFICANT CHANGE UP (ref 17–32)
CREAT SERPL-MCNC: 3.6 MG/DL — HIGH (ref 0.7–1.5)
EOSINOPHIL # BLD AUTO: 0.17 K/UL — SIGNIFICANT CHANGE UP (ref 0–0.7)
EOSINOPHIL NFR BLD AUTO: 1.8 % — SIGNIFICANT CHANGE UP (ref 0–8)
ESTIMATED AVERAGE GLUCOSE: 163 MG/DL — HIGH (ref 68–114)
GLUCOSE SERPL-MCNC: 136 MG/DL — HIGH (ref 70–99)
HBA1C BLD-MCNC: 7.3 % — HIGH (ref 4–5.6)
HCT VFR BLD CALC: 33.8 % — LOW (ref 42–52)
HGB BLD-MCNC: 11.2 G/DL — LOW (ref 14–18)
IMM GRANULOCYTES NFR BLD AUTO: 0.3 % — SIGNIFICANT CHANGE UP (ref 0.1–0.3)
INR BLD: 1 RATIO — SIGNIFICANT CHANGE UP (ref 0.65–1.3)
LYMPHOCYTES # BLD AUTO: 1.53 K/UL — SIGNIFICANT CHANGE UP (ref 1.2–3.4)
LYMPHOCYTES # BLD AUTO: 15.9 % — LOW (ref 20.5–51.1)
MCHC RBC-ENTMCNC: 29.5 PG — SIGNIFICANT CHANGE UP (ref 27–31)
MCHC RBC-ENTMCNC: 33.1 G/DL — SIGNIFICANT CHANGE UP (ref 32–37)
MCV RBC AUTO: 88.9 FL — SIGNIFICANT CHANGE UP (ref 80–94)
MONOCYTES # BLD AUTO: 0.68 K/UL — HIGH (ref 0.1–0.6)
MONOCYTES NFR BLD AUTO: 7 % — SIGNIFICANT CHANGE UP (ref 1.7–9.3)
NEUTROPHILS # BLD AUTO: 7.15 K/UL — HIGH (ref 1.4–6.5)
NEUTROPHILS NFR BLD AUTO: 74.1 % — SIGNIFICANT CHANGE UP (ref 42.2–75.2)
NRBC # BLD: 0 /100 WBCS — SIGNIFICANT CHANGE UP (ref 0–0)
PLATELET # BLD AUTO: 201 K/UL — SIGNIFICANT CHANGE UP (ref 130–400)
POTASSIUM SERPL-MCNC: 4.9 MMOL/L — SIGNIFICANT CHANGE UP (ref 3.5–5)
POTASSIUM SERPL-SCNC: 4.9 MMOL/L — SIGNIFICANT CHANGE UP (ref 3.5–5)
PROT SERPL-MCNC: 6.2 G/DL — SIGNIFICANT CHANGE UP (ref 6–8)
PROTHROM AB SERPL-ACNC: 11.5 SEC — SIGNIFICANT CHANGE UP (ref 9.95–12.87)
RBC # BLD: 3.8 M/UL — LOW (ref 4.7–6.1)
RBC # FLD: 12.7 % — SIGNIFICANT CHANGE UP (ref 11.5–14.5)
SODIUM SERPL-SCNC: 140 MMOL/L — SIGNIFICANT CHANGE UP (ref 135–146)
WBC # BLD: 9.65 K/UL — SIGNIFICANT CHANGE UP (ref 4.8–10.8)
WBC # FLD AUTO: 9.65 K/UL — SIGNIFICANT CHANGE UP (ref 4.8–10.8)

## 2019-03-07 NOTE — H&P PST ADULT - PMH
CAD (coronary artery disease)    Chronic kidney disease (CKD)    Diabetes mellitus    Hypertension    S/P CABG (coronary artery bypass graft)    Stented coronary artery  in 2008  Transient ischemic attack (TIA) CAD (coronary artery disease)    Chronic kidney disease (CKD)    Diabetes mellitus    Hypertension    Myocardial infarction    S/P CABG (coronary artery bypass graft)    Stented coronary artery  in 2008  Transient ischemic attack (TIA)

## 2019-03-07 NOTE — H&P PST ADULT - OTHER CARE PROVIDERS
lucio- [place]   card- [facility]  [facility]   [facility] [place]   card- [place]aciullo  renal- el- nichol   neuro- el-shrif

## 2019-03-07 NOTE — H&P PST ADULT - PSH
H/O arterial bypass of lower limb  Left Lower Extremity  S/P CABG (coronary artery bypass graft)  in 2012 H/O arterial bypass of lower limb  Left Lower Extremity  History of surgery  card cath - stent  left cea  S/P CABG (coronary artery bypass graft)  in 2012

## 2019-03-07 NOTE — H&P PST ADULT - FAMILY HISTORY
Father  Still living? Unknown  Family history of heart disease, Age at diagnosis: Age Unknown Father  Still living? Unknown  Family history of heart disease, Age at diagnosis: Age Unknown     Mother  Still living? Unknown  DM (diabetes mellitus), Age at diagnosis: Age Unknown  ESRD (end stage renal disease) on dialysis, Age at diagnosis: Age Unknown

## 2019-03-07 NOTE — H&P PST ADULT - NSANTHOSAYNRD_GEN_A_CORE
No. HAYDEN screening performed.  STOP BANG Legend: 0-2 = LOW Risk; 3-4 = INTERMEDIATE Risk; 5-8 = HIGH Risk

## 2019-03-07 NOTE — H&P PST ADULT - HISTORY OF PRESENT ILLNESS
59 Y/O MALE PRESENTS TO PAST WITH HX PAD  PT NOW FOR SCHEDULED PROCEDURE. PT DENIES ANY CP SOB PALP COUGH DYSURIA FEVER URI. PT ABLE TO CHIO 1-2 FOS W/O SOB

## 2019-04-01 ENCOUNTER — APPOINTMENT (OUTPATIENT)
Dept: VASCULAR SURGERY | Facility: HOSPITAL | Age: 59
End: 2019-04-01
Payer: COMMERCIAL

## 2019-04-01 ENCOUNTER — OUTPATIENT (OUTPATIENT)
Dept: OUTPATIENT SERVICES | Facility: HOSPITAL | Age: 59
LOS: 1 days | Discharge: HOME | End: 2019-04-01

## 2019-04-01 VITALS
RESPIRATION RATE: 18 BRPM | HEIGHT: 71 IN | SYSTOLIC BLOOD PRESSURE: 180 MMHG | WEIGHT: 209 LBS | DIASTOLIC BLOOD PRESSURE: 96 MMHG | HEART RATE: 86 BPM | TEMPERATURE: 98 F

## 2019-04-01 VITALS — DIASTOLIC BLOOD PRESSURE: 100 MMHG | HEART RATE: 75 BPM | RESPIRATION RATE: 16 BRPM | SYSTOLIC BLOOD PRESSURE: 181 MMHG

## 2019-04-01 DIAGNOSIS — Z95.828 PRESENCE OF OTHER VASCULAR IMPLANTS AND GRAFTS: Chronic | ICD-10-CM

## 2019-04-01 DIAGNOSIS — Z98.890 OTHER SPECIFIED POSTPROCEDURAL STATES: Chronic | ICD-10-CM

## 2019-04-01 DIAGNOSIS — Z95.1 PRESENCE OF AORTOCORONARY BYPASS GRAFT: Chronic | ICD-10-CM

## 2019-04-01 LAB — GLUCOSE BLDC GLUCOMTR-MCNC: 200 MG/DL — HIGH (ref 70–99)

## 2019-04-01 PROCEDURE — 75630 X-RAY AORTA LEG ARTERIES: CPT | Mod: 26,59

## 2019-04-01 PROCEDURE — 75710 ARTERY X-RAYS ARM/LEG: CPT | Mod: 26,59

## 2019-04-01 PROCEDURE — 37228: CPT | Mod: LT

## 2019-04-01 PROCEDURE — 76937 US GUIDE VASCULAR ACCESS: CPT | Mod: 26

## 2019-04-01 PROCEDURE — 36247 INS CATH ABD/L-EXT ART 3RD: CPT | Mod: 59,LT

## 2019-04-01 PROCEDURE — 36200 PLACE CATHETER IN AORTA: CPT | Mod: 59,LT

## 2019-04-01 RX ORDER — SODIUM CHLORIDE 9 MG/ML
1000 INJECTION, SOLUTION INTRAVENOUS
Qty: 0 | Refills: 0 | Status: DISCONTINUED | OUTPATIENT
Start: 2019-04-01 | End: 2019-04-01

## 2019-04-01 RX ORDER — ONDANSETRON 8 MG/1
4 TABLET, FILM COATED ORAL ONCE
Qty: 0 | Refills: 0 | Status: DISCONTINUED | OUTPATIENT
Start: 2019-04-01 | End: 2019-04-01

## 2019-04-01 RX ORDER — HYDROMORPHONE HYDROCHLORIDE 2 MG/ML
0.5 INJECTION INTRAMUSCULAR; INTRAVENOUS; SUBCUTANEOUS
Qty: 0 | Refills: 0 | Status: DISCONTINUED | OUTPATIENT
Start: 2019-04-01 | End: 2019-04-01

## 2019-04-01 RX ORDER — METFORMIN HYDROCHLORIDE 850 MG/1
1 TABLET ORAL
Qty: 0 | Refills: 0 | COMMUNITY

## 2019-04-01 RX ORDER — ASPIRIN/CALCIUM CARB/MAGNESIUM 324 MG
325 TABLET ORAL ONCE
Qty: 0 | Refills: 0 | Status: COMPLETED | OUTPATIENT
Start: 2019-04-01 | End: 2019-04-01

## 2019-04-01 RX ADMIN — SODIUM CHLORIDE 100 MILLILITER(S): 9 INJECTION, SOLUTION INTRAVENOUS at 09:18

## 2019-04-01 RX ADMIN — Medication 325 MILLIGRAM(S): at 10:25

## 2019-04-01 NOTE — ASU DISCHARGE PLAN (ADULT/PEDIATRIC) - CARE PROVIDER_API CALL
Reynaldo Fernandez)  Vascular Surgery  [address] [place] [address]  Phone: [phone]  Fax: [phone]  Follow Up Time: 2 weeks

## 2019-04-01 NOTE — BRIEF OPERATIVE NOTE - NSICDXBRIEFPOSTOP_GEN_ALL_CORE_FT
POST-OP DIAGNOSIS:  Failing vascular bypass graft, initial encounter 01-Apr-2019 09:14:09  Reynaldo Fernandez

## 2019-04-01 NOTE — ASU PATIENT PROFILE, ADULT - ACCEPTABLE
Medical Assistant Note:  Austin ANETTE Garza presents today for Blood Draw.    Patient seen by provider today: No.   present during visit today: Not Applicable.    Concerns: No Concerns.    Procedure:  Lab draw site: right arm, Needle type: butterfly, Gauge: 21.    Post Assessment:  Labs drawn without difficulty: Yes.    Discharge Plan:  Departure Mode: Ambulatory.    Face to Face Time: 10.    Kimberley Luna CMA            
0

## 2019-04-01 NOTE — ASU DISCHARGE PLAN (ADULT/PEDIATRIC) - CALL YOUR DOCTOR IF YOU HAVE ANY OF THE FOLLOWING:
Swelling that gets worse/Numbness, tingling, color or temperature change to extremity/Pain not relieved by Medications/Wound/Surgical Site with redness, or foul smelling discharge or pus/Bleeding that does not stop

## 2019-04-01 NOTE — BRIEF OPERATIVE NOTE - NSICDXBRIEFPREOP_GEN_ALL_CORE_FT
PRE-OP DIAGNOSIS:  Failing vascular bypass graft, initial encounter 01-Apr-2019 09:13:53  Reynaldo Fernandez

## 2019-04-01 NOTE — ASU PATIENT PROFILE, ADULT - PSH
H/O arterial bypass of lower limb  Left Lower Extremity  History of surgery  card cath - stent  left cea  S/P CABG (coronary artery bypass graft)  in 2012

## 2019-04-01 NOTE — ASU PATIENT PROFILE, ADULT - PMH
CAD (coronary artery disease)    Chronic kidney disease (CKD)    Diabetes mellitus    Hypertension    Myocardial infarction    S/P CABG (coronary artery bypass graft)    Stented coronary artery  in 2008  Transient ischemic attack (TIA)

## 2019-04-01 NOTE — BRIEF OPERATIVE NOTE - NSICDXBRIEFPROCEDURE_GEN_ALL_CORE_FT
PROCEDURES:  Balloon angioplasty of saphenous vein graft 01-Apr-2019 09:13:30  Reynaldo Fernandez  Angiogram, aorta, abdominal, with bilateral iliofemoral runoff 01-Apr-2019 09:12:54  Reynaldo Fernandez

## 2019-04-08 DIAGNOSIS — T82.318A BREAKDOWN (MECHANICAL) OF OTHER VASCULAR GRAFTS, INITIAL ENCOUNTER: ICD-10-CM
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
## 2019-04-16 ENCOUNTER — APPOINTMENT (OUTPATIENT)
Dept: VASCULAR SURGERY | Facility: CLINIC | Age: 59
End: 2019-04-16
Payer: COMMERCIAL

## 2019-04-16 PROCEDURE — 99212 OFFICE O/P EST SF 10 MIN: CPT

## 2019-04-16 PROCEDURE — 93926 LOWER EXTREMITY STUDY: CPT

## 2019-04-16 NOTE — REASON FOR VISIT
[Follow-Up: _____] : a [unfilled] follow-up visit [FreeTextEntry1] : Balloon angioplasty of saphenous vein graft 01-Apr-2019

## 2019-04-16 NOTE — DATA REVIEWED
[FreeTextEntry1] : Arterial duplex shows a patent saphenous vein graft bypass no evidence of restenosis

## 2019-04-16 NOTE — HISTORY OF PRESENT ILLNESS
[FreeTextEntry1] : Mr. Schwabacher is a 57 year-old gentleman with PVD and carotid stenosis who underwent left below knee popliteal to AT artery reverse saphenous vein graft in April 2017 for left foot gangrene and right carotid endarterectomy in June 2017.\par \par He is S/p left leg angiogram and balloon plasty of his bypass graft.

## 2019-04-16 NOTE — ASSESSMENT
[FreeTextEntry1] : The patient is a 59-year-old male status post angioplasty of a greater saphenous vein bypass stenosis. The bypass is patent. The patient has a history of carotid endarterectomy and carotid stenosis. I would like to send him for a carotid duplex on Friday and see him back in my office in 3 months time for a followup evaluation of his lower extremity arterial hemodynamics.

## 2019-04-19 ENCOUNTER — APPOINTMENT (OUTPATIENT)
Dept: VASCULAR SURGERY | Facility: CLINIC | Age: 59
End: 2019-04-19
Payer: COMMERCIAL

## 2019-04-19 PROCEDURE — 93880 EXTRACRANIAL BILAT STUDY: CPT

## 2019-04-23 ENCOUNTER — APPOINTMENT (OUTPATIENT)
Dept: HEMATOLOGY ONCOLOGY | Facility: CLINIC | Age: 59
End: 2019-04-23

## 2019-05-07 ENCOUNTER — RECORD ABSTRACTING (OUTPATIENT)
Age: 59
End: 2019-05-07

## 2019-05-07 DIAGNOSIS — R20.0 ANESTHESIA OF SKIN: ICD-10-CM

## 2019-05-07 DIAGNOSIS — R29.898 OTHER SYMPTOMS AND SIGNS INVOLVING THE MUSCULOSKELETAL SYSTEM: ICD-10-CM

## 2019-05-07 DIAGNOSIS — G45.9 TRANSIENT CEREBRAL ISCHEMIC ATTACK, UNSPECIFIED: ICD-10-CM

## 2019-05-07 RX ORDER — INSULIN DETEMIR 100 [IU]/ML
INJECTION, SOLUTION SUBCUTANEOUS
Refills: 0 | Status: ACTIVE | COMMUNITY

## 2019-05-23 ENCOUNTER — APPOINTMENT (OUTPATIENT)
Dept: NEUROLOGY | Facility: CLINIC | Age: 59
End: 2019-05-23
Payer: COMMERCIAL

## 2019-05-23 VITALS
HEART RATE: 90 BPM | HEIGHT: 71 IN | OXYGEN SATURATION: 99 % | WEIGHT: 205 LBS | BODY MASS INDEX: 28.7 KG/M2 | DIASTOLIC BLOOD PRESSURE: 83 MMHG | SYSTOLIC BLOOD PRESSURE: 134 MMHG

## 2019-05-23 DIAGNOSIS — I63.9 CEREBRAL INFARCTION, UNSPECIFIED: ICD-10-CM

## 2019-05-23 DIAGNOSIS — Z78.9 OTHER SPECIFIED HEALTH STATUS: ICD-10-CM

## 2019-05-23 PROCEDURE — 99214 OFFICE O/P EST MOD 30 MIN: CPT

## 2019-05-23 RX ORDER — ASPIRIN ENTERIC COATED TABLETS 81 MG 81 MG/1
81 TABLET, DELAYED RELEASE ORAL
Refills: 0 | Status: ACTIVE | COMMUNITY

## 2019-05-23 NOTE — HISTORY OF PRESENT ILLNESS
[FreeTextEntry1] : Chris is a 59-year-old man here for followup of his stroke. Since the last visit he is developed some kidney disease that is worsened and his nephrologist and asked him to go on the kidney transplant list. He has had no new stroke symptoms in regards to his stroke he has no complaints. He did not do the event monitor and eventually just sent the device back to the company. I discussed with him about doing an implantable loop recorder and he is more amenable to the implantable loop recorder then a 30 day event monitor. His hypercoagulable workup was negative. His MR angiogram did not show any disease in his right internal carotid artery and some mild disease in his left internal carotid artery. However his stroke was on the right side was cortically based 2 little lacunar infarcts.\par \par Mr. Schwabacher is a 58-year-old man history of CABG x 4, CAD, hypertension, diabetes, stroke, who was seen by me last in September 7, 2018 for follow-up of a stroke, where he had weakness of his left hand was found on MRI to have acute infarcts in the right posterior frontal parietal region, consistent acute ischemia.  MRI angiogram showed left internal carotid origin and proximal short segment mild stenosis 50% and less than 50% in a patent right carotid, which did not explain the cause of the stroke.  He was on aspirin, Plavix, and statin because of his vascular risk factors statin was lowered at that time because of muscle aches.  He was sent for an event monitor, which he did not do. He says he still has event monitor in the box at home.  Of note about a month and half ago, he had an episode where he lost balance and fell into a fence.  Since then he has had a number of episodes where he has lost his balance.

## 2019-05-23 NOTE — DISCUSSION/SUMMARY
[FreeTextEntry1] : Chris is here for followup of his stroke his strokes appear to be cryptogenic and possibly embolic. The 2 etiologies which are discussed with him was either thrombotic from plaque breaking off and giving 2 separate little punctate infarcts. The other possibility was paroxysmal atrial fibrillation. Because of that possibility of A. fib would recommend he goes for an implantable loop recorder.\par \par A. Refer to the cardiac electrophysiology for an implantable loop recorder\par B. Continue aspirin 81 Plavix 75 and atorvastatin.\par C. Follow up here in one year unless there are new complaints

## 2019-05-23 NOTE — PHYSICAL EXAM
[FreeTextEntry1] : Orientation: oriented to person, oriented to place and oriented to time. \par Attention: normal concentrating ability and visual attention was not decreased. \par Language: no difficulty naming common objects, no difficulty repeating a phrase, no difficulty writing a sentence, fluency intact, comprehension intact and reading intact. \par Fund of knowledge: displays adequate knowledge of personal past history. \par Cranial Nerves: visual acuity intact bilaterally, visual fields full to confrontation, pupils equal round and reactive to light, extraocular motion intact, facial sensation intact symmetrically, face symmetrical, hearing was intact bilaterally, tongue and palate midline, head turning and shoulder shrug symmetric and there was no tongue deviation with protrusion. \par Motor: muscle tone was normal in all four extremities, muscle strength was normal in all four extremities and normal bulk in all four extremities. \par Sensory exam: light touch, PP, Vibration was intact. \par Coordination:. normal gait. balance was intact. there was no past-pointing. no tremor present. \par Deep tendon reflexes: \par Reflexes are absent throughout\par Plantar responses normal on the right, normal on the left.  \par

## 2019-06-11 ENCOUNTER — APPOINTMENT (OUTPATIENT)
Dept: UROLOGY | Facility: CLINIC | Age: 59
End: 2019-06-11

## 2019-07-19 ENCOUNTER — APPOINTMENT (OUTPATIENT)
Dept: VASCULAR SURGERY | Facility: CLINIC | Age: 59
End: 2019-07-19
Payer: COMMERCIAL

## 2019-07-19 ENCOUNTER — OTHER (OUTPATIENT)
Age: 59
End: 2019-07-19

## 2019-07-19 PROCEDURE — 99213 OFFICE O/P EST LOW 20 MIN: CPT

## 2019-07-19 PROCEDURE — 93926 LOWER EXTREMITY STUDY: CPT

## 2019-07-19 NOTE — DATA REVIEWED
[FreeTextEntry1] : I performed an arterial duplex which was medically necessary to evaluate for patency of the bypass graft. It shows a patent saphenous vein graft bypass ( left bk popliteal to AT) with flow disturbance with velocity of 200 cm/s and biphasic flow in the bypass graft.

## 2019-07-19 NOTE — CONSULT LETTER
[Dear  ___] : Dear  [unfilled], [Courtesy Letter:] : I had the pleasure of seeing your patient, [unfilled], in my office today. [Please see my note below.] : Please see my note below. [FreeTextEntry2] : Dr. Aidan Pitts

## 2019-07-19 NOTE — HISTORY OF PRESENT ILLNESS
[FreeTextEntry1] : Mr. Schwabacher is a 57 year-old gentleman with PVD and carotid stenosis who underwent left below knee popliteal to AT artery reverse saphenous vein graft in April 2017 for left foot gangrene and right carotid endarterectomy in June 2017.\par \par He is S/p left leg angiogram and balloon plasty of his bypass graft in March 2019. C/o leg heaviness.

## 2019-07-19 NOTE — ASSESSMENT
[FreeTextEntry1] : Mr. Schwabacher is a 57 year-old gentleman with PVD and carotid stenosis who underwent left below knee popliteal to AT artery reverse saphenous vein graft in April 2017 for left foot gangrene and right carotid endarterectomy in June 2017. He is S/p balloon plasty of his left leg bypass graft in March 2019. C/o leg heaviness. Carotid duplex in April 2019 showed patent right CEA site and <50% left ICA stenosis.\par I performed an arterial duplex which was medically necessary to evaluate for patency of the bypass graft. It shows a patent saphenous vein graft bypass ( left bk popliteal to AT) with flow disturbance with velocity of 200 cm/s and biphasic flow in the bypass graft.\par I have informed him of the test results and advised followup in 3 months time for repeat lower extremity graft duplex.

## 2019-08-09 ENCOUNTER — OUTPATIENT (OUTPATIENT)
Dept: OUTPATIENT SERVICES | Facility: HOSPITAL | Age: 59
LOS: 1 days | Discharge: HOME | End: 2019-08-09

## 2019-08-09 DIAGNOSIS — Z95.1 PRESENCE OF AORTOCORONARY BYPASS GRAFT: Chronic | ICD-10-CM

## 2019-08-09 DIAGNOSIS — E11.9 TYPE 2 DIABETES MELLITUS WITHOUT COMPLICATIONS: ICD-10-CM

## 2019-08-09 DIAGNOSIS — K76.89 OTHER SPECIFIED DISEASES OF LIVER: ICD-10-CM

## 2019-08-09 DIAGNOSIS — Z98.890 OTHER SPECIFIED POSTPROCEDURAL STATES: Chronic | ICD-10-CM

## 2019-08-09 DIAGNOSIS — N40.0 BENIGN PROSTATIC HYPERPLASIA WITHOUT LOWER URINARY TRACT SYMPTOMS: ICD-10-CM

## 2019-08-09 DIAGNOSIS — D64.9 ANEMIA, UNSPECIFIED: ICD-10-CM

## 2019-08-09 DIAGNOSIS — Z95.828 PRESENCE OF OTHER VASCULAR IMPLANTS AND GRAFTS: Chronic | ICD-10-CM

## 2019-08-09 DIAGNOSIS — E03.9 HYPOTHYROIDISM, UNSPECIFIED: ICD-10-CM

## 2019-08-13 ENCOUNTER — APPOINTMENT (OUTPATIENT)
Dept: UROLOGY | Facility: CLINIC | Age: 59
End: 2019-08-13
Payer: COMMERCIAL

## 2019-08-13 PROCEDURE — 99214 OFFICE O/P EST MOD 30 MIN: CPT

## 2019-09-03 ENCOUNTER — OUTPATIENT (OUTPATIENT)
Dept: OUTPATIENT SERVICES | Facility: HOSPITAL | Age: 59
LOS: 1 days | Discharge: HOME | End: 2019-09-03

## 2019-09-03 DIAGNOSIS — I10 ESSENTIAL (PRIMARY) HYPERTENSION: ICD-10-CM

## 2019-09-03 DIAGNOSIS — N18.4 CHRONIC KIDNEY DISEASE, STAGE 4 (SEVERE): ICD-10-CM

## 2019-09-03 DIAGNOSIS — Z95.1 PRESENCE OF AORTOCORONARY BYPASS GRAFT: Chronic | ICD-10-CM

## 2019-09-03 DIAGNOSIS — Z95.828 PRESENCE OF OTHER VASCULAR IMPLANTS AND GRAFTS: Chronic | ICD-10-CM

## 2019-09-03 DIAGNOSIS — E11.9 TYPE 2 DIABETES MELLITUS WITHOUT COMPLICATIONS: ICD-10-CM

## 2019-09-03 DIAGNOSIS — Z98.890 OTHER SPECIFIED POSTPROCEDURAL STATES: Chronic | ICD-10-CM

## 2019-09-19 ENCOUNTER — OUTPATIENT (OUTPATIENT)
Dept: OUTPATIENT SERVICES | Facility: HOSPITAL | Age: 59
LOS: 1 days | Discharge: HOME | End: 2019-09-19

## 2019-09-19 DIAGNOSIS — E11.65 TYPE 2 DIABETES MELLITUS WITH HYPERGLYCEMIA: ICD-10-CM

## 2019-09-19 DIAGNOSIS — E78.5 HYPERLIPIDEMIA, UNSPECIFIED: ICD-10-CM

## 2019-09-19 DIAGNOSIS — Z95.828 PRESENCE OF OTHER VASCULAR IMPLANTS AND GRAFTS: Chronic | ICD-10-CM

## 2019-09-19 DIAGNOSIS — Z98.890 OTHER SPECIFIED POSTPROCEDURAL STATES: Chronic | ICD-10-CM

## 2019-09-19 DIAGNOSIS — Z95.1 PRESENCE OF AORTOCORONARY BYPASS GRAFT: Chronic | ICD-10-CM

## 2019-09-19 DIAGNOSIS — I10 ESSENTIAL (PRIMARY) HYPERTENSION: ICD-10-CM

## 2019-09-21 ENCOUNTER — EMERGENCY (EMERGENCY)
Facility: HOSPITAL | Age: 59
LOS: 0 days | Discharge: HOME | End: 2019-09-21
Admitting: EMERGENCY MEDICINE
Payer: COMMERCIAL

## 2019-09-21 VITALS
HEART RATE: 80 BPM | TEMPERATURE: 96 F | WEIGHT: 201.94 LBS | OXYGEN SATURATION: 100 % | DIASTOLIC BLOOD PRESSURE: 83 MMHG | RESPIRATION RATE: 18 BRPM | SYSTOLIC BLOOD PRESSURE: 182 MMHG

## 2019-09-21 DIAGNOSIS — Z95.1 PRESENCE OF AORTOCORONARY BYPASS GRAFT: Chronic | ICD-10-CM

## 2019-09-21 DIAGNOSIS — M25.562 PAIN IN LEFT KNEE: ICD-10-CM
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
## 2019-09-21 DIAGNOSIS — M79.606 PAIN IN LEG, UNSPECIFIED: ICD-10-CM

## 2019-09-21 DIAGNOSIS — Z95.828 PRESENCE OF OTHER VASCULAR IMPLANTS AND GRAFTS: Chronic | ICD-10-CM
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
## 2019-09-21 DIAGNOSIS — Z98.890 OTHER SPECIFIED POSTPROCEDURAL STATES: Chronic | ICD-10-CM

## 2019-09-21 DIAGNOSIS — S89.92XA UNSPECIFIED INJURY OF LEFT LOWER LEG, INITIAL ENCOUNTER: ICD-10-CM

## 2019-09-21 PROCEDURE — 99283 EMERGENCY DEPT VISIT LOW MDM: CPT

## 2019-09-21 PROCEDURE — 73590 X-RAY EXAM OF LOWER LEG: CPT | Mod: 26,LT

## 2019-09-21 PROCEDURE — 73562 X-RAY EXAM OF KNEE 3: CPT | Mod: 26,LT

## 2019-09-21 NOTE — ED PROVIDER NOTE - PROVIDER TOKENS
FREE:[LAST:[your orthopedist],PHONE:[(   )    -],FAX:[(   )    -],FOLLOWUP:[1-3 Days]],PROVIDER:[TOKEN:[[id number]],FOLLOWUP:[1-3 Days]]

## 2019-09-21 NOTE — ED PROVIDER NOTE - PATIENT PORTAL LINK FT
You can access the FollowMyHealth Patient Portal offered by [facility] by registering at the following website: [email or web address]. By joining [employer]’s FollowMyHealth portal, you will also be able to view your health information using other applications (apps) compatible with our system.

## 2019-09-21 NOTE — ED PROVIDER NOTE - OBJECTIVE STATEMENT
58 yo M c/o left leg pain. Patient states he tripped and fell 2 steps twisting his left leg at 9:30am. Pain is constant, moderate, took Tylenol PTA. No head injury, neck/back, chest or abdominal pains. No pain to other extremities.

## 2019-09-21 NOTE — ED PROVIDER NOTE - NS ED ROS FT
Review of Systems    Constitutional: (-) fever, (-) chills  Cardiovascular: (-) chest pain, (-) syncope  Respiratory: (-) cough, (-) shortness of breath  Gastrointestinal: (-) pain, (-) nausea, (-) vomiting, (-) diarrhea  Musculoskeletal: (-) neck pain, (-) back pain, (+) left leg pain  Integumentary: (-) rash, (-) edema  Neurological: (-) headache, (-) altered mental status

## 2019-09-21 NOTE — ED PROVIDER NOTE - PHYSICAL EXAMINATION
Gen: Alert, NAD, well appearing  Head: NC, AT, PERRL, EOMI, normal lids/conjunctiva  Neck: +supple, no tenderness/meningismus,  Abd: soft, NT/ND  Mskel: LLE: + tender to palpation left knee and left shin. FROM intact to left leg at hip, knee and ankle. No swelling or ecchymosis. n/v intact. Patient ambulatory in ED. No edema/erythema/cyanosis. NT and FROM to x3 other extremities. No back tenderness.   Skin: no rash, warm/dry  Neuro: AAOx3, no sensory/motor deficits

## 2019-09-21 NOTE — ED PROVIDER NOTE - CARE PROVIDER_API CALL
your orthopedist,   Phone: (   )    -  Fax: (   )    -  Follow Up Time: 1-3 Days    Narciso Oscar)  Orthopaedic Surgery  [address] [place] [id number]  Phone: [phone]  Fax: [phone]  Follow Up Time: 1-3 Days

## 2019-10-10 ENCOUNTER — OUTPATIENT (OUTPATIENT)
Dept: OUTPATIENT SERVICES | Facility: HOSPITAL | Age: 59
LOS: 1 days | Discharge: HOME | End: 2019-10-10

## 2019-10-10 DIAGNOSIS — I10 ESSENTIAL (PRIMARY) HYPERTENSION: ICD-10-CM

## 2019-10-10 DIAGNOSIS — N18.4 CHRONIC KIDNEY DISEASE, STAGE 4 (SEVERE): ICD-10-CM

## 2019-10-10 DIAGNOSIS — Z95.1 PRESENCE OF AORTOCORONARY BYPASS GRAFT: Chronic | ICD-10-CM

## 2019-10-10 DIAGNOSIS — E11.9 TYPE 2 DIABETES MELLITUS WITHOUT COMPLICATIONS: ICD-10-CM

## 2019-10-10 DIAGNOSIS — Z98.890 OTHER SPECIFIED POSTPROCEDURAL STATES: Chronic | ICD-10-CM

## 2019-10-10 DIAGNOSIS — Z95.828 PRESENCE OF OTHER VASCULAR IMPLANTS AND GRAFTS: Chronic | ICD-10-CM

## 2019-10-11 ENCOUNTER — APPOINTMENT (OUTPATIENT)
Dept: VASCULAR SURGERY | Facility: CLINIC | Age: 59
End: 2019-10-11
Payer: COMMERCIAL

## 2019-10-15 ENCOUNTER — APPOINTMENT (OUTPATIENT)
Dept: VASCULAR SURGERY | Facility: CLINIC | Age: 59
End: 2019-10-15
Payer: COMMERCIAL

## 2019-10-15 ENCOUNTER — RX RENEWAL (OUTPATIENT)
Age: 59
End: 2019-10-15

## 2019-10-15 VITALS — SYSTOLIC BLOOD PRESSURE: 130 MMHG | DIASTOLIC BLOOD PRESSURE: 80 MMHG

## 2019-10-15 PROCEDURE — G0365: CPT

## 2019-10-15 PROCEDURE — 93926 LOWER EXTREMITY STUDY: CPT

## 2019-10-15 PROCEDURE — 99213 OFFICE O/P EST LOW 20 MIN: CPT

## 2019-10-15 NOTE — DATA REVIEWED
[FreeTextEntry1] : I performed an arterial duplex which was medically necessary to evaluate for patency of the bypass graft. It shows a patent saphenous vein graft bypass ( left bk popliteal to AT) with flow disturbance with velocity of 190 cm/s and biphasic flow in the bypass graft.\par \par Left upper extremity hemodialysis vein mapping. The patient has adequate radial cephalic vein to use as conduit for creation of AV fistula.

## 2019-10-15 NOTE — CONSULT LETTER
[Dear  ___] : Dear  [unfilled], [Courtesy Letter:] : I had the pleasure of seeing your patient, [unfilled], in my office today. [Please see my note below.] : Please see my note below. [FreeTextEntry2] : Dr. Aidan Pitts\par Dr. Graves

## 2019-10-15 NOTE — HISTORY OF PRESENT ILLNESS
[FreeTextEntry1] : Mr. Schwabacher is a 57 year-old gentleman with PVD and carotid stenosis who underwent left below knee popliteal to AT artery reverse saphenous vein graft in April 2017 for left foot gangrene and right carotid endarterectomy in June 2017.\par \par He is S/p left leg angiogram and balloon plasty of his bypass graft in March 2019. C/o leg heaviness.\par The patient was seen by nephrology and has stage 4 renal disease he will require AV access.

## 2019-10-15 NOTE — ASSESSMENT
[FreeTextEntry1] : Mr. Schwabacher is a 57 year-old gentleman with PVD and carotid stenosis who underwent left below knee popliteal to AT artery reverse saphenous vein graft in April 2017 for left foot gangrene and right carotid endarterectomy in June 2017. He is S/p balloon plasty of his left leg bypass graft in March 2019. C/o leg heaviness. Carotid duplex in April 2019 showed patent right CEA site and <50% left ICA stenosis.\par I performed an arterial duplex which was medically necessary to evaluate for patency of the bypass graft. It shows a patent saphenous vein graft bypass ( left bk popliteal to AT) with flow disturbance with velocity of 200 cm/s and biphasic flow in the bypass graft which is unchanged from previous study 3 months ago.\par Left upper extremity hemodialysis vein mapping. The patient has adequate radial cephalic vein to use as conduit for creation of AV fistula. I will schedule the patient for a creation of left radiocephalic fistula on October 23. Patient will require medical clearance.

## 2019-10-20 ENCOUNTER — FORM ENCOUNTER (OUTPATIENT)
Age: 59
End: 2019-10-20

## 2019-10-21 ENCOUNTER — OUTPATIENT (OUTPATIENT)
Dept: OUTPATIENT SERVICES | Facility: HOSPITAL | Age: 59
LOS: 1 days | Discharge: HOME | End: 2019-10-21
Payer: COMMERCIAL

## 2019-10-21 VITALS
HEART RATE: 89 BPM | HEIGHT: 71 IN | WEIGHT: 197.98 LBS | OXYGEN SATURATION: 100 % | RESPIRATION RATE: 20 BRPM | TEMPERATURE: 97 F | DIASTOLIC BLOOD PRESSURE: 75 MMHG | SYSTOLIC BLOOD PRESSURE: 159 MMHG

## 2019-10-21 DIAGNOSIS — Z95.1 PRESENCE OF AORTOCORONARY BYPASS GRAFT: Chronic | ICD-10-CM

## 2019-10-21 DIAGNOSIS — Z98.890 OTHER SPECIFIED POSTPROCEDURAL STATES: Chronic | ICD-10-CM

## 2019-10-21 DIAGNOSIS — N18.6 END STAGE RENAL DISEASE: ICD-10-CM

## 2019-10-21 DIAGNOSIS — Z95.828 PRESENCE OF OTHER VASCULAR IMPLANTS AND GRAFTS: Chronic | ICD-10-CM

## 2019-10-21 DIAGNOSIS — Z01.818 ENCOUNTER FOR OTHER PREPROCEDURAL EXAMINATION: ICD-10-CM

## 2019-10-21 LAB
ALBUMIN SERPL ELPH-MCNC: 4.3 G/DL — SIGNIFICANT CHANGE UP (ref 3.5–5.2)
ALP SERPL-CCNC: 113 U/L — SIGNIFICANT CHANGE UP (ref 30–115)
ALT FLD-CCNC: 14 U/L — SIGNIFICANT CHANGE UP (ref 0–41)
ANION GAP SERPL CALC-SCNC: 19 MMOL/L — HIGH (ref 7–14)
APPEARANCE UR: CLEAR — SIGNIFICANT CHANGE UP
APTT BLD: 32.2 SEC — SIGNIFICANT CHANGE UP (ref 27–39.2)
AST SERPL-CCNC: 18 U/L — SIGNIFICANT CHANGE UP (ref 0–41)
BACTERIA # UR AUTO: NEGATIVE — SIGNIFICANT CHANGE UP
BASOPHILS # BLD AUTO: 0.1 K/UL — SIGNIFICANT CHANGE UP (ref 0–0.2)
BASOPHILS NFR BLD AUTO: 1 % — SIGNIFICANT CHANGE UP (ref 0–1)
BILIRUB SERPL-MCNC: <0.2 MG/DL — SIGNIFICANT CHANGE UP (ref 0.2–1.2)
BILIRUB UR-MCNC: NEGATIVE — SIGNIFICANT CHANGE UP
BLD GP AB SCN SERPL QL: SIGNIFICANT CHANGE UP
BUN SERPL-MCNC: 95 MG/DL — CRITICAL HIGH (ref 10–20)
CALCIUM SERPL-MCNC: 8.3 MG/DL — LOW (ref 8.5–10.1)
CHLORIDE SERPL-SCNC: 102 MMOL/L — SIGNIFICANT CHANGE UP (ref 98–110)
CO2 SERPL-SCNC: 18 MMOL/L — SIGNIFICANT CHANGE UP (ref 17–32)
COLOR SPEC: SIGNIFICANT CHANGE UP
CREAT SERPL-MCNC: 5 MG/DL — CRITICAL HIGH (ref 0.7–1.5)
DIFF PNL FLD: ABNORMAL
EOSINOPHIL # BLD AUTO: 0.15 K/UL — SIGNIFICANT CHANGE UP (ref 0–0.7)
EOSINOPHIL NFR BLD AUTO: 1.5 % — SIGNIFICANT CHANGE UP (ref 0–8)
EPI CELLS # UR: 1 /HPF — SIGNIFICANT CHANGE UP (ref 0–5)
ESTIMATED AVERAGE GLUCOSE: 148 MG/DL — HIGH (ref 68–114)
GLUCOSE SERPL-MCNC: 116 MG/DL — HIGH (ref 70–99)
GLUCOSE UR QL: ABNORMAL
HBA1C BLD-MCNC: 6.8 % — HIGH (ref 4–5.6)
HCT VFR BLD CALC: 30.1 % — LOW (ref 42–52)
HGB BLD-MCNC: 9.7 G/DL — LOW (ref 14–18)
HYALINE CASTS # UR AUTO: 1 /LPF — SIGNIFICANT CHANGE UP (ref 0–7)
IMM GRANULOCYTES NFR BLD AUTO: 0.3 % — SIGNIFICANT CHANGE UP (ref 0.1–0.3)
INR BLD: 1.01 RATIO — SIGNIFICANT CHANGE UP (ref 0.65–1.3)
KETONES UR-MCNC: NEGATIVE — SIGNIFICANT CHANGE UP
LEUKOCYTE ESTERASE UR-ACNC: NEGATIVE — SIGNIFICANT CHANGE UP
LYMPHOCYTES # BLD AUTO: 1.72 K/UL — SIGNIFICANT CHANGE UP (ref 1.2–3.4)
LYMPHOCYTES # BLD AUTO: 17.5 % — LOW (ref 20.5–51.1)
MCHC RBC-ENTMCNC: 29.7 PG — SIGNIFICANT CHANGE UP (ref 27–31)
MCHC RBC-ENTMCNC: 32.2 G/DL — SIGNIFICANT CHANGE UP (ref 32–37)
MCV RBC AUTO: 92 FL — SIGNIFICANT CHANGE UP (ref 80–94)
MONOCYTES # BLD AUTO: 0.73 K/UL — HIGH (ref 0.1–0.6)
MONOCYTES NFR BLD AUTO: 7.4 % — SIGNIFICANT CHANGE UP (ref 1.7–9.3)
NEUTROPHILS # BLD AUTO: 7.11 K/UL — HIGH (ref 1.4–6.5)
NEUTROPHILS NFR BLD AUTO: 72.3 % — SIGNIFICANT CHANGE UP (ref 42.2–75.2)
NITRITE UR-MCNC: NEGATIVE — SIGNIFICANT CHANGE UP
NRBC # BLD: 0 /100 WBCS — SIGNIFICANT CHANGE UP (ref 0–0)
PH UR: 6 — SIGNIFICANT CHANGE UP (ref 5–8)
PLATELET # BLD AUTO: 238 K/UL — SIGNIFICANT CHANGE UP (ref 130–400)
POTASSIUM SERPL-MCNC: 5.1 MMOL/L — HIGH (ref 3.5–5)
POTASSIUM SERPL-SCNC: 5.1 MMOL/L — HIGH (ref 3.5–5)
PROT SERPL-MCNC: 7.4 G/DL — SIGNIFICANT CHANGE UP (ref 6–8)
PROT UR-MCNC: ABNORMAL
PROTHROM AB SERPL-ACNC: 11.6 SEC — SIGNIFICANT CHANGE UP (ref 9.95–12.87)
RBC # BLD: 3.27 M/UL — LOW (ref 4.7–6.1)
RBC # FLD: 13.9 % — SIGNIFICANT CHANGE UP (ref 11.5–14.5)
RBC CASTS # UR COMP ASSIST: 7 /HPF — HIGH (ref 0–4)
SODIUM SERPL-SCNC: 139 MMOL/L — SIGNIFICANT CHANGE UP (ref 135–146)
SP GR SPEC: 1.02 — SIGNIFICANT CHANGE UP (ref 1.01–1.02)
UROBILINOGEN FLD QL: SIGNIFICANT CHANGE UP
WBC # BLD: 9.84 K/UL — SIGNIFICANT CHANGE UP (ref 4.8–10.8)
WBC # FLD AUTO: 9.84 K/UL — SIGNIFICANT CHANGE UP (ref 4.8–10.8)
WBC UR QL: 3 /HPF — SIGNIFICANT CHANGE UP (ref 0–5)

## 2019-10-21 PROCEDURE — 93010 ELECTROCARDIOGRAM REPORT: CPT

## 2019-10-21 PROCEDURE — 71046 X-RAY EXAM CHEST 2 VIEWS: CPT | Mod: 26

## 2019-10-21 RX ORDER — LOSARTAN POTASSIUM 100 MG/1
1 TABLET, FILM COATED ORAL
Qty: 0 | Refills: 0 | DISCHARGE

## 2019-10-21 RX ORDER — INSULIN DETEMIR 100/ML (3)
40 INSULIN PEN (ML) SUBCUTANEOUS
Qty: 0 | Refills: 0 | DISCHARGE

## 2019-10-21 NOTE — H&P PST ADULT - HISTORY OF PRESENT ILLNESS
58 y/o h/o CKD IV; reports last Cr 5.9 (1 month ago); No HD to date; elected for procedure.  Denies chest pain, palp, SOB, dizziness or syncope; Admits to cough with clear sputum; ambs 2-3 blks with mild BROWN.   *** Patient was seen in ED on 9/21/19 s/p fall at home.   ** Reports BPH; scheduled for prostate bx  10/29/19.

## 2019-10-21 NOTE — H&P PST ADULT - NSICDXPASTMEDICALHX_GEN_ALL_CORE_FT
PAST MEDICAL HISTORY:  BPH (benign prostatic hyperplasia)     CAD (coronary artery disease)     Chronic anemia     Chronic kidney disease (CKD) Stage IV    Diabetes mellitus     HLD (hyperlipidemia)     [place] (hard of hearing)     Hypertension     Myocardial infarction 2012    OA (osteoarthritis)     PAD (peripheral artery disease)     Pain in left knee s/p fall    S/P CABG (coronary artery bypass graft) x4    Stented coronary artery in 2008    Transient ischemic attack (TIA) 2017; 2008

## 2019-10-21 NOTE — H&P PST ADULT - NSICDXFAMILYHX_GEN_ALL_CORE_FT
FAMILY HISTORY:  Father  Still living? Unknown  Family history of heart disease, Age at diagnosis: Age Unknown    Mother  Still living? Unknown  DM (diabetes mellitus), Age at diagnosis: Age Unknown  ESRD (end stage renal disease) on dialysis, Age at diagnosis: Age Unknown

## 2019-10-21 NOTE — H&P PST ADULT - NSICDXPASTSURGICALHX_GEN_ALL_CORE_FT
PAST SURGICAL HISTORY:  H/O arterial bypass of lower limb Left Lower Extremity (2016)    History of surgery Left CEA (2017)  Left Pinkie toe Amputation (2014)  CABG x 4 (2012)  Card cath - stent (2008)      S/P CABG (coronary artery bypass graft) 2012

## 2019-10-24 ENCOUNTER — APPOINTMENT (OUTPATIENT)
Dept: VASCULAR SURGERY | Facility: HOSPITAL | Age: 59
End: 2019-10-24

## 2019-10-24 ENCOUNTER — OUTPATIENT (OUTPATIENT)
Dept: OUTPATIENT SERVICES | Facility: HOSPITAL | Age: 59
LOS: 1 days | Discharge: HOME | End: 2019-10-24
Payer: COMMERCIAL

## 2019-10-24 VITALS
DIASTOLIC BLOOD PRESSURE: 83 MMHG | RESPIRATION RATE: 18 BRPM | WEIGHT: 197.98 LBS | TEMPERATURE: 98 F | SYSTOLIC BLOOD PRESSURE: 159 MMHG | HEART RATE: 89 BPM | HEIGHT: 71 IN

## 2019-10-24 VITALS — SYSTOLIC BLOOD PRESSURE: 146 MMHG | RESPIRATION RATE: 16 BRPM | DIASTOLIC BLOOD PRESSURE: 76 MMHG | HEART RATE: 77 BPM

## 2019-10-24 DIAGNOSIS — Z98.890 OTHER SPECIFIED POSTPROCEDURAL STATES: Chronic | ICD-10-CM

## 2019-10-24 DIAGNOSIS — Z95.828 PRESENCE OF OTHER VASCULAR IMPLANTS AND GRAFTS: Chronic | ICD-10-CM

## 2019-10-24 DIAGNOSIS — Z95.1 PRESENCE OF AORTOCORONARY BYPASS GRAFT: Chronic | ICD-10-CM

## 2019-10-24 PROBLEM — G45.9 TRANSIENT CEREBRAL ISCHEMIC ATTACK, UNSPECIFIED: Chronic | Status: ACTIVE | Noted: 2018-08-25

## 2019-10-24 PROBLEM — D64.9 ANEMIA, UNSPECIFIED: Chronic | Status: ACTIVE | Noted: 2019-10-21

## 2019-10-24 PROBLEM — I21.9 ACUTE MYOCARDIAL INFARCTION, UNSPECIFIED: Chronic | Status: ACTIVE | Noted: 2019-03-07

## 2019-10-24 PROBLEM — N18.9 CHRONIC KIDNEY DISEASE, UNSPECIFIED: Chronic | Status: ACTIVE | Noted: 2018-08-25

## 2019-10-24 PROBLEM — N40.0 BENIGN PROSTATIC HYPERPLASIA WITHOUT LOWER URINARY TRACT SYMPTOMS: Chronic | Status: ACTIVE | Noted: 2019-10-21

## 2019-10-24 PROBLEM — M19.90 UNSPECIFIED OSTEOARTHRITIS, UNSPECIFIED SITE: Chronic | Status: ACTIVE | Noted: 2019-10-21

## 2019-10-24 PROBLEM — M25.562 PAIN IN LEFT KNEE: Chronic | Status: ACTIVE | Noted: 2019-10-21

## 2019-10-24 PROBLEM — H91.90 UNSPECIFIED HEARING LOSS, UNSPECIFIED EAR: Chronic | Status: ACTIVE | Noted: 2019-10-21

## 2019-10-24 PROBLEM — E78.5 HYPERLIPIDEMIA, UNSPECIFIED: Chronic | Status: ACTIVE | Noted: 2019-10-21

## 2019-10-24 LAB
GLUCOSE BLDC GLUCOMTR-MCNC: 125 MG/DL — HIGH (ref 70–99)
POTASSIUM SERPL-MCNC: 4.9 MMOL/L — SIGNIFICANT CHANGE UP (ref 3.5–5)
POTASSIUM SERPL-SCNC: 4.9 MMOL/L — SIGNIFICANT CHANGE UP (ref 3.5–5)

## 2019-10-24 PROCEDURE — 36821 AV FUSION DIRECT ANY SITE: CPT

## 2019-10-24 RX ORDER — SODIUM CHLORIDE 9 MG/ML
1000 INJECTION INTRAMUSCULAR; INTRAVENOUS; SUBCUTANEOUS
Refills: 0 | Status: DISCONTINUED | OUTPATIENT
Start: 2019-10-24 | End: 2019-10-25

## 2019-10-24 RX ORDER — HYDROMORPHONE HYDROCHLORIDE 2 MG/ML
0.5 INJECTION INTRAMUSCULAR; INTRAVENOUS; SUBCUTANEOUS
Refills: 0 | Status: DISCONTINUED | OUTPATIENT
Start: 2019-10-24 | End: 2019-10-25

## 2019-10-24 RX ADMIN — SODIUM CHLORIDE 30 MILLILITER(S): 9 INJECTION INTRAMUSCULAR; INTRAVENOUS; SUBCUTANEOUS at 10:42

## 2019-10-24 NOTE — CHART NOTE - NSCHARTNOTEFT_GEN_A_CORE
PACU ANESTHESIA ADMISSION NOTE      Procedure: Creation, AV fistula, upper extremity    Post op diagnosis:  ESRD on hemodialysis      ____  Intubated  TV:______       Rate: ______      FiO2: ______    _x___  Patent Airway    _x___  Full return of protective reflexes    __x__  Full recovery from anesthesia / back to baseline     Vitals:   T:  36.3         R: 16                  BP:   138/70               Sat:   100                P: 78      Mental Status:  __x__ Awake   _x____ Alert   _____ Drowsy   _____ Sedated    Nausea/Vomiting:  _x___ NO  ______Yes,   See Post - Op Orders          Pain Scale (0-10):  _____    Treatment: _x___ None    ____ See Post - Op/PCA Orders    Post - Operative Fluids:   __x__ Oral   ____ See Post - Op Orders    Plan: Discharge:   _x___Home       _____Floor     _____Critical Care    _____  Other:_________________    Comments: tolerated procedure well

## 2019-10-24 NOTE — ASU DISCHARGE PLAN (ADULT/PEDIATRIC) - CALL YOUR DOCTOR IF YOU HAVE ANY OF THE FOLLOWING:
Bleeding that does not stop/Fever greater than (need to indicate Fahrenheit or Celsius)/Numbness, tingling, color or temperature change to extremity/Wound/Surgical Site with redness, or foul smelling discharge or pus/Pain not relieved by Medications/Swelling that gets worse

## 2019-10-24 NOTE — ASU PATIENT PROFILE, ADULT - PSH
H/O arterial bypass of lower limb  Left Lower Extremity (2016)  History of surgery  Left CEA (2017)  Left Pinkie toe Amputation (2014)  CABG x 4 (2012)  Card cath - stent (2008)    S/P CABG (coronary artery bypass graft)  2012

## 2019-10-24 NOTE — ASU PATIENT PROFILE, ADULT - PMH
BPH (benign prostatic hyperplasia)    CAD (coronary artery disease)    Chronic anemia    Chronic kidney disease (CKD)  Stage IV  Diabetes mellitus    HLD (hyperlipidemia)    [place] (hard of hearing)    Hypertension    Myocardial infarction  2012  OA (osteoarthritis)    PAD (peripheral artery disease)    Pain in left knee  s/p fall  S/P CABG (coronary artery bypass graft)  x4  Stented coronary artery  in 2008  Transient ischemic attack (TIA)  2017; 2008

## 2019-10-29 DIAGNOSIS — N18.6 END STAGE RENAL DISEASE: ICD-10-CM

## 2019-11-08 ENCOUNTER — APPOINTMENT (OUTPATIENT)
Dept: VASCULAR SURGERY | Facility: CLINIC | Age: 59
End: 2019-11-08
Payer: COMMERCIAL

## 2019-11-08 PROCEDURE — 99024 POSTOP FOLLOW-UP VISIT: CPT

## 2019-11-08 NOTE — DISCUSSION/SUMMARY
[FreeTextEntry1] : Patient is 2 weeks status post left radiocephalic fistula creation. The vein is maturing nicely and is a perfect thrill in it. It will be ready for access within 6 weeks.\par \par I will have the patient come back in 6 weeks' time for a repeat lower extremity arterial graft duplex

## 2019-11-19 ENCOUNTER — OUTPATIENT (OUTPATIENT)
Dept: OUTPATIENT SERVICES | Facility: HOSPITAL | Age: 59
LOS: 1 days | Discharge: HOME | End: 2019-11-19

## 2019-11-19 DIAGNOSIS — Z95.1 PRESENCE OF AORTOCORONARY BYPASS GRAFT: Chronic | ICD-10-CM

## 2019-11-19 DIAGNOSIS — Z95.828 PRESENCE OF OTHER VASCULAR IMPLANTS AND GRAFTS: Chronic | ICD-10-CM

## 2019-11-19 DIAGNOSIS — R97.20 ELEVATED PROSTATE SPECIFIC ANTIGEN [PSA]: ICD-10-CM

## 2019-11-19 DIAGNOSIS — Z98.890 OTHER SPECIFIED POSTPROCEDURAL STATES: Chronic | ICD-10-CM

## 2019-11-20 LAB — BACTERIA UR CULT: NORMAL

## 2019-11-21 ENCOUNTER — OUTPATIENT (OUTPATIENT)
Dept: OUTPATIENT SERVICES | Facility: HOSPITAL | Age: 59
LOS: 1 days | Discharge: HOME | End: 2019-11-21

## 2019-11-21 DIAGNOSIS — I10 ESSENTIAL (PRIMARY) HYPERTENSION: ICD-10-CM

## 2019-11-21 DIAGNOSIS — Z95.828 PRESENCE OF OTHER VASCULAR IMPLANTS AND GRAFTS: Chronic | ICD-10-CM

## 2019-11-21 DIAGNOSIS — Z95.1 PRESENCE OF AORTOCORONARY BYPASS GRAFT: Chronic | ICD-10-CM

## 2019-11-21 DIAGNOSIS — Z98.890 OTHER SPECIFIED POSTPROCEDURAL STATES: Chronic | ICD-10-CM

## 2019-11-21 DIAGNOSIS — I12.9 HYPERTENSIVE CHRONIC KIDNEY DISEASE WITH STAGE 1 THROUGH STAGE 4 CHRONIC KIDNEY DISEASE, OR UNSPECIFIED CHRONIC KIDNEY DISEASE: ICD-10-CM

## 2019-11-21 DIAGNOSIS — N18.5 CHRONIC KIDNEY DISEASE, STAGE 5: ICD-10-CM

## 2019-11-21 DIAGNOSIS — E11.9 TYPE 2 DIABETES MELLITUS WITHOUT COMPLICATIONS: ICD-10-CM

## 2019-11-26 ENCOUNTER — LABORATORY RESULT (OUTPATIENT)
Age: 59
End: 2019-11-26

## 2019-11-26 ENCOUNTER — APPOINTMENT (OUTPATIENT)
Dept: UROLOGY | Facility: CLINIC | Age: 59
End: 2019-11-26
Payer: COMMERCIAL

## 2019-11-26 PROCEDURE — 55700: CPT

## 2019-11-26 PROCEDURE — 76872 US TRANSRECTAL: CPT

## 2019-11-27 ENCOUNTER — OUTPATIENT (OUTPATIENT)
Dept: OUTPATIENT SERVICES | Facility: HOSPITAL | Age: 59
LOS: 1 days | Discharge: HOME | End: 2019-11-27

## 2019-11-27 DIAGNOSIS — Z98.890 OTHER SPECIFIED POSTPROCEDURAL STATES: Chronic | ICD-10-CM

## 2019-11-27 DIAGNOSIS — Z95.828 PRESENCE OF OTHER VASCULAR IMPLANTS AND GRAFTS: Chronic | ICD-10-CM

## 2019-11-27 DIAGNOSIS — Z95.1 PRESENCE OF AORTOCORONARY BYPASS GRAFT: Chronic | ICD-10-CM

## 2019-11-29 DIAGNOSIS — R97.20 ELEVATED PROSTATE SPECIFIC ANTIGEN [PSA]: ICD-10-CM

## 2019-12-10 ENCOUNTER — APPOINTMENT (OUTPATIENT)
Dept: UROLOGY | Facility: CLINIC | Age: 59
End: 2019-12-10
Payer: COMMERCIAL

## 2019-12-10 PROCEDURE — 99213 OFFICE O/P EST LOW 20 MIN: CPT

## 2019-12-19 ENCOUNTER — OUTPATIENT (OUTPATIENT)
Dept: OUTPATIENT SERVICES | Facility: HOSPITAL | Age: 59
LOS: 1 days | Discharge: HOME | End: 2019-12-19

## 2019-12-19 DIAGNOSIS — Z95.1 PRESENCE OF AORTOCORONARY BYPASS GRAFT: Chronic | ICD-10-CM

## 2019-12-19 DIAGNOSIS — N18.5 CHRONIC KIDNEY DISEASE, STAGE 5: ICD-10-CM

## 2019-12-19 DIAGNOSIS — Z95.828 PRESENCE OF OTHER VASCULAR IMPLANTS AND GRAFTS: Chronic | ICD-10-CM

## 2019-12-19 DIAGNOSIS — E11.9 TYPE 2 DIABETES MELLITUS WITHOUT COMPLICATIONS: ICD-10-CM

## 2019-12-19 DIAGNOSIS — Z98.890 OTHER SPECIFIED POSTPROCEDURAL STATES: Chronic | ICD-10-CM

## 2019-12-19 DIAGNOSIS — I10 ESSENTIAL (PRIMARY) HYPERTENSION: ICD-10-CM

## 2019-12-20 ENCOUNTER — APPOINTMENT (OUTPATIENT)
Dept: VASCULAR SURGERY | Facility: CLINIC | Age: 59
End: 2019-12-20
Payer: COMMERCIAL

## 2019-12-20 PROCEDURE — 93926 LOWER EXTREMITY STUDY: CPT

## 2019-12-20 PROCEDURE — 99024 POSTOP FOLLOW-UP VISIT: CPT

## 2019-12-20 NOTE — DATA REVIEWED
[FreeTextEntry1] : I performed an arterial duplex which was medically necessary to evaluate for patency of the bypass graft. It shows a patent saphenous vein graft bypass ( left bk popliteal to AT) with flow disturbance with velocity of 340 cm/s and decreased flow in the bypass graft with velocity 17-20 cm/s.\par

## 2019-12-20 NOTE — ASSESSMENT
[FreeTextEntry1] : Mr. Schwabacher is a 59 year-old gentleman with PVD and carotid stenosis who underwent left below knee popliteal to AT artery reverse saphenous vein graft in April 2017 for left foot gangrene and right carotid endarterectomy in June 2017.\par The left AVF is ready to be accessed for HD. \par I performed an arterial duplex which was medically necessary to evaluate for patency of the bypass graft. It shows a patent saphenous vein graft bypass ( left bk popliteal to AT) with flow disturbance with velocity of 340 cm/s and decreased flow in the bypass graft with velocity 17-20 cm/s.\par I have offered him a left lower extremity angiogram and he wants to proceed. He is scheduled for the procedure on 1/8/19.\par

## 2019-12-20 NOTE — HISTORY OF PRESENT ILLNESS
[FreeTextEntry1] : Mr. Schwabacher is a 59 year-old gentleman with PVD and carotid stenosis who underwent left below knee popliteal to AT artery reverse saphenous vein graft in April 2017 for left foot gangrene and right carotid endarterectomy in June 2017.\par \par He is S/p left leg angiogram and balloon plasty of his left lower extremity bypass graft in March 2019. He has ESRD and underwent creation of left radiocephalic AVF on 10/24/19.\par \par He was found to have stenosis of the left lower extremity bypass on duplex in October and presents for followup.

## 2019-12-20 NOTE — CONSULT LETTER
[Dear  ___] : Dear  [unfilled], [Please see my note below.] : Please see my note below. [Courtesy Letter:] : I had the pleasure of seeing your patient, [unfilled], in my office today. [FreeTextEntry2] : Dear Dr. Aidan Pitts,\par Dear Dr. Kory Ivan,\par

## 2019-12-27 ENCOUNTER — INPATIENT (INPATIENT)
Facility: HOSPITAL | Age: 59
LOS: 7 days | Discharge: HOME | End: 2020-01-04
Attending: INTERNAL MEDICINE | Admitting: INTERNAL MEDICINE
Payer: COMMERCIAL

## 2019-12-27 VITALS
SYSTOLIC BLOOD PRESSURE: 161 MMHG | TEMPERATURE: 97 F | OXYGEN SATURATION: 99 % | RESPIRATION RATE: 18 BRPM | HEART RATE: 84 BPM | DIASTOLIC BLOOD PRESSURE: 73 MMHG

## 2019-12-27 DIAGNOSIS — Z95.1 PRESENCE OF AORTOCORONARY BYPASS GRAFT: Chronic | ICD-10-CM

## 2019-12-27 DIAGNOSIS — Z98.890 OTHER SPECIFIED POSTPROCEDURAL STATES: Chronic | ICD-10-CM

## 2019-12-27 DIAGNOSIS — Z95.828 PRESENCE OF OTHER VASCULAR IMPLANTS AND GRAFTS: Chronic | ICD-10-CM

## 2019-12-27 LAB
ALBUMIN SERPL ELPH-MCNC: 3.9 G/DL — SIGNIFICANT CHANGE UP (ref 3.5–5.2)
ALP SERPL-CCNC: 132 U/L — HIGH (ref 30–115)
ALT FLD-CCNC: 21 U/L — SIGNIFICANT CHANGE UP (ref 0–41)
ANION GAP SERPL CALC-SCNC: 19 MMOL/L — HIGH (ref 7–14)
APTT BLD: 33.6 SEC — SIGNIFICANT CHANGE UP (ref 27–39.2)
AST SERPL-CCNC: 20 U/L — SIGNIFICANT CHANGE UP (ref 0–41)
BASOPHILS # BLD AUTO: 0.1 K/UL — SIGNIFICANT CHANGE UP (ref 0–0.2)
BASOPHILS NFR BLD AUTO: 1 % — SIGNIFICANT CHANGE UP (ref 0–1)
BILIRUB SERPL-MCNC: 0.3 MG/DL — SIGNIFICANT CHANGE UP (ref 0.2–1.2)
BUN SERPL-MCNC: 65 MG/DL — CRITICAL HIGH (ref 10–20)
CALCIUM SERPL-MCNC: 7.9 MG/DL — LOW (ref 8.5–10.1)
CHLORIDE SERPL-SCNC: 104 MMOL/L — SIGNIFICANT CHANGE UP (ref 98–110)
CK MB CFR SERPL CALC: 7.1 NG/ML — HIGH (ref 0.6–6.3)
CK SERPL-CCNC: 231 U/L — HIGH (ref 0–225)
CO2 SERPL-SCNC: 18 MMOL/L — SIGNIFICANT CHANGE UP (ref 17–32)
CREAT SERPL-MCNC: 4.9 MG/DL — CRITICAL HIGH (ref 0.7–1.5)
D DIMER BLD IA.RAPID-MCNC: 390 NG/ML DDU — HIGH (ref 0–230)
EOSINOPHIL # BLD AUTO: 0.35 K/UL — SIGNIFICANT CHANGE UP (ref 0–0.7)
EOSINOPHIL NFR BLD AUTO: 3.6 % — SIGNIFICANT CHANGE UP (ref 0–8)
GLUCOSE BLDC GLUCOMTR-MCNC: 117 MG/DL — HIGH (ref 70–99)
GLUCOSE SERPL-MCNC: 83 MG/DL — SIGNIFICANT CHANGE UP (ref 70–99)
HCT VFR BLD CALC: 31.4 % — LOW (ref 42–52)
HGB BLD-MCNC: 9.8 G/DL — LOW (ref 14–18)
IMM GRANULOCYTES NFR BLD AUTO: 0.4 % — HIGH (ref 0.1–0.3)
INR BLD: 1.13 RATIO — SIGNIFICANT CHANGE UP (ref 0.65–1.3)
LYMPHOCYTES # BLD AUTO: 1.03 K/UL — LOW (ref 1.2–3.4)
LYMPHOCYTES # BLD AUTO: 10.6 % — LOW (ref 20.5–51.1)
MCHC RBC-ENTMCNC: 29.9 PG — SIGNIFICANT CHANGE UP (ref 27–31)
MCHC RBC-ENTMCNC: 31.2 G/DL — LOW (ref 32–37)
MCV RBC AUTO: 95.7 FL — HIGH (ref 80–94)
MONOCYTES # BLD AUTO: 0.58 K/UL — SIGNIFICANT CHANGE UP (ref 0.1–0.6)
MONOCYTES NFR BLD AUTO: 6 % — SIGNIFICANT CHANGE UP (ref 1.7–9.3)
NEUTROPHILS # BLD AUTO: 7.6 K/UL — HIGH (ref 1.4–6.5)
NEUTROPHILS NFR BLD AUTO: 78.4 % — HIGH (ref 42.2–75.2)
NRBC # BLD: 0 /100 WBCS — SIGNIFICANT CHANGE UP (ref 0–0)
NT-PROBNP SERPL-SCNC: HIGH PG/ML (ref 0–300)
PLATELET # BLD AUTO: 235 K/UL — SIGNIFICANT CHANGE UP (ref 130–400)
POTASSIUM SERPL-MCNC: 5.2 MMOL/L — HIGH (ref 3.5–5)
POTASSIUM SERPL-SCNC: 5.2 MMOL/L — HIGH (ref 3.5–5)
PROT SERPL-MCNC: 7 G/DL — SIGNIFICANT CHANGE UP (ref 6–8)
PROTHROM AB SERPL-ACNC: 13 SEC — HIGH (ref 9.95–12.87)
RBC # BLD: 3.28 M/UL — LOW (ref 4.7–6.1)
RBC # FLD: 12.7 % — SIGNIFICANT CHANGE UP (ref 11.5–14.5)
SODIUM SERPL-SCNC: 141 MMOL/L — SIGNIFICANT CHANGE UP (ref 135–146)
TROPONIN T SERPL-MCNC: 0.25 NG/ML — CRITICAL HIGH
TROPONIN T SERPL-MCNC: 0.28 NG/ML — CRITICAL HIGH
URATE SERPL-MCNC: 6.7 MG/DL — SIGNIFICANT CHANGE UP (ref 3.4–8.8)
WBC # BLD: 9.7 K/UL — SIGNIFICANT CHANGE UP (ref 4.8–10.8)
WBC # FLD AUTO: 9.7 K/UL — SIGNIFICANT CHANGE UP (ref 4.8–10.8)

## 2019-12-27 PROCEDURE — 71045 X-RAY EXAM CHEST 1 VIEW: CPT | Mod: 26

## 2019-12-27 PROCEDURE — 93010 ELECTROCARDIOGRAM REPORT: CPT | Mod: 77

## 2019-12-27 PROCEDURE — 93970 EXTREMITY STUDY: CPT | Mod: 26

## 2019-12-27 PROCEDURE — 71275 CT ANGIOGRAPHY CHEST: CPT | Mod: 26

## 2019-12-27 PROCEDURE — 93010 ELECTROCARDIOGRAM REPORT: CPT

## 2019-12-27 PROCEDURE — 99285 EMERGENCY DEPT VISIT HI MDM: CPT

## 2019-12-27 RX ORDER — PANTOPRAZOLE SODIUM 20 MG/1
40 TABLET, DELAYED RELEASE ORAL
Refills: 0 | Status: DISCONTINUED | OUTPATIENT
Start: 2019-12-27 | End: 2020-01-04

## 2019-12-27 RX ORDER — POLYETHYLENE GLYCOL 3350 17 G/17G
17 POWDER, FOR SOLUTION ORAL DAILY
Refills: 0 | Status: DISCONTINUED | OUTPATIENT
Start: 2019-12-27 | End: 2020-01-04

## 2019-12-27 RX ORDER — CHLORHEXIDINE GLUCONATE 213 G/1000ML
1 SOLUTION TOPICAL
Refills: 0 | Status: DISCONTINUED | OUTPATIENT
Start: 2019-12-27 | End: 2020-01-04

## 2019-12-27 RX ORDER — FUROSEMIDE 40 MG
20 TABLET ORAL ONCE
Refills: 0 | Status: COMPLETED | OUTPATIENT
Start: 2019-12-27 | End: 2019-12-27

## 2019-12-27 RX ORDER — DEXTROSE 50 % IN WATER 50 %
25 SYRINGE (ML) INTRAVENOUS ONCE
Refills: 0 | Status: DISCONTINUED | OUTPATIENT
Start: 2019-12-27 | End: 2020-01-04

## 2019-12-27 RX ORDER — SODIUM CHLORIDE 9 MG/ML
1000 INJECTION, SOLUTION INTRAVENOUS
Refills: 0 | Status: DISCONTINUED | OUTPATIENT
Start: 2019-12-27 | End: 2020-01-04

## 2019-12-27 RX ORDER — SIMVASTATIN 20 MG/1
40 TABLET, FILM COATED ORAL AT BEDTIME
Refills: 0 | Status: DISCONTINUED | OUTPATIENT
Start: 2019-12-27 | End: 2020-01-04

## 2019-12-27 RX ORDER — METOPROLOL TARTRATE 50 MG
50 TABLET ORAL
Refills: 0 | Status: DISCONTINUED | OUTPATIENT
Start: 2019-12-27 | End: 2020-01-04

## 2019-12-27 RX ORDER — FUROSEMIDE 40 MG
1 TABLET ORAL
Qty: 0 | Refills: 0 | DISCHARGE

## 2019-12-27 RX ORDER — DEXTROSE 50 % IN WATER 50 %
12.5 SYRINGE (ML) INTRAVENOUS ONCE
Refills: 0 | Status: DISCONTINUED | OUTPATIENT
Start: 2019-12-27 | End: 2020-01-04

## 2019-12-27 RX ORDER — HEPARIN SODIUM 5000 [USP'U]/ML
5000 INJECTION INTRAVENOUS; SUBCUTANEOUS EVERY 12 HOURS
Refills: 0 | Status: DISCONTINUED | OUTPATIENT
Start: 2019-12-27 | End: 2019-12-31

## 2019-12-27 RX ORDER — LANOLIN ALCOHOL/MO/W.PET/CERES
10 CREAM (GRAM) TOPICAL AT BEDTIME
Refills: 0 | Status: DISCONTINUED | OUTPATIENT
Start: 2019-12-27 | End: 2020-01-04

## 2019-12-27 RX ORDER — INSULIN GLARGINE 100 [IU]/ML
40 INJECTION, SOLUTION SUBCUTANEOUS AT BEDTIME
Refills: 0 | Status: DISCONTINUED | OUTPATIENT
Start: 2019-12-27 | End: 2019-12-31

## 2019-12-27 RX ORDER — ASPIRIN/CALCIUM CARB/MAGNESIUM 324 MG
81 TABLET ORAL DAILY
Refills: 0 | Status: DISCONTINUED | OUTPATIENT
Start: 2019-12-27 | End: 2020-01-04

## 2019-12-27 RX ORDER — SODIUM CHLORIDE 9 MG/ML
1000 INJECTION INTRAMUSCULAR; INTRAVENOUS; SUBCUTANEOUS ONCE
Refills: 0 | Status: COMPLETED | OUTPATIENT
Start: 2019-12-27 | End: 2019-12-27

## 2019-12-27 RX ORDER — CLOPIDOGREL BISULFATE 75 MG/1
75 TABLET, FILM COATED ORAL DAILY
Refills: 0 | Status: DISCONTINUED | OUTPATIENT
Start: 2019-12-27 | End: 2020-01-04

## 2019-12-27 RX ORDER — INSULIN LISPRO 100/ML
12 VIAL (ML) SUBCUTANEOUS
Refills: 0 | Status: DISCONTINUED | OUTPATIENT
Start: 2019-12-27 | End: 2020-01-04

## 2019-12-27 RX ORDER — GLUCAGON INJECTION, SOLUTION 0.5 MG/.1ML
1 INJECTION, SOLUTION SUBCUTANEOUS ONCE
Refills: 0 | Status: DISCONTINUED | OUTPATIENT
Start: 2019-12-27 | End: 2020-01-04

## 2019-12-27 RX ORDER — INSULIN LISPRO 100/ML
VIAL (ML) SUBCUTANEOUS
Refills: 0 | Status: DISCONTINUED | OUTPATIENT
Start: 2019-12-27 | End: 2020-01-04

## 2019-12-27 RX ORDER — DEXTROSE 50 % IN WATER 50 %
15 SYRINGE (ML) INTRAVENOUS ONCE
Refills: 0 | Status: DISCONTINUED | OUTPATIENT
Start: 2019-12-27 | End: 2020-01-04

## 2019-12-27 RX ORDER — ACETAMINOPHEN 500 MG
650 TABLET ORAL EVERY 6 HOURS
Refills: 0 | Status: DISCONTINUED | OUTPATIENT
Start: 2019-12-27 | End: 2020-01-04

## 2019-12-27 RX ADMIN — Medication 50 MILLIGRAM(S): at 19:15

## 2019-12-27 RX ADMIN — SIMVASTATIN 40 MILLIGRAM(S): 20 TABLET, FILM COATED ORAL at 23:23

## 2019-12-27 RX ADMIN — SODIUM CHLORIDE 1000 MILLILITER(S): 9 INJECTION INTRAMUSCULAR; INTRAVENOUS; SUBCUTANEOUS at 12:15

## 2019-12-27 RX ADMIN — Medication 20 MILLIGRAM(S): at 15:22

## 2019-12-27 RX ADMIN — INSULIN GLARGINE 40 UNIT(S): 100 INJECTION, SOLUTION SUBCUTANEOUS at 23:23

## 2019-12-27 RX ADMIN — SODIUM CHLORIDE 1000 MILLILITER(S): 9 INJECTION INTRAMUSCULAR; INTRAVENOUS; SUBCUTANEOUS at 19:15

## 2019-12-27 NOTE — ED ADULT NURSE NOTE - PMH
BPH (benign prostatic hyperplasia)    CAD (coronary artery disease)    Chronic anemia    Chronic kidney disease (CKD)  Stage IV  Diabetes mellitus    HLD (hyperlipidemia)    [place] (hard of hearing)    Hypertension    Myocardial infarction  2012  OA (osteoarthritis)    PAD (peripheral artery disease)    Pain in left knee  s/p fall  S/P CABG (coronary artery bypass graft)  x4  Stented coronary artery  in 2008  Transient ischemic attack (TIA)  2017; 2008

## 2019-12-27 NOTE — H&P ADULT - NSICDXPASTMEDICALHX_GEN_ALL_CORE_FT
PAST MEDICAL HISTORY:  BPH (benign prostatic hyperplasia)     CAD (coronary artery disease)     Chronic anemia     Chronic kidney disease (CKD) Stage IV    Diabetes mellitus     HLD (hyperlipidemia)     [place] (hard of hearing)     Hypertension     Myocardial infarction 2012    OA (osteoarthritis)     PAD (peripheral artery disease) S/p bypass left leg    Pain in left knee s/p fall    S/P CABG (coronary artery bypass graft) x4    Stented coronary artery in 2008    Transient ischemic attack (TIA) 2017; 2008

## 2019-12-27 NOTE — ED ADULT NURSE NOTE - NSIMPLEMENTINTERV_GEN_ALL_ED
Implemented All Universal Safety Interventions:  [place] to call system. Call bell, personal items and telephone within reach. Instruct patient to call for assistance. Room bathroom lighting operational. Non-slip footwear when patient is off stretcher. Physically safe environment: no spills, clutter or unnecessary equipment. Stretcher in lowest position, wheels locked, appropriate side rails in place.

## 2019-12-27 NOTE — H&P ADULT - HISTORY OF PRESENT ILLNESS
Patient is a 59 year old male with history of chronic kidney disease IV not on hemodialysis, Coronary Artery Disease status post CABG, Diabetes Mellitus II, PVD presenting with shortness of breath for 1 week. Patient reports that he was in his usual state of health until a week ago when he began to feel shortness of breath. Dyspnea worse when lying flat, felt like he was choking, and also worse on exertion with exercise tolerance decreasing from unlimited to 1 block. Endorses cough productive of yellow sputum and also endorses swelling in his legs, L>R, but states that has been going on for several months; is not sure if it has gotten worse recently. Denies decrease in urine output. Denies recent illness, chest pain, palpitations, diarrhea.   In Emergency Department, VSS, saturating well ORA. Left lower leg was noted to be more edematous than right, D-dimer elevated, but Duplex and CT Angio negative for PE. BNP elevated to 12,178, troponin 0.28, EKG w/o ischemic changes but Chest X-Ray with cardiomegaly, bilateral congestion, effusions and CT Angio with bilateral pleural effusions. Labs also notable for K 5.2, Cr 4.9 with BUN 65. Admitted to medicine. Patient is a 59 year old male with history of chronic kidney disease IV not on hemodialysis, Coronary Artery Disease status post CABG, Diabetes Mellitus II, PVD presenting with shortness of breath for 1 week. Patient reports that he was in his usual state of health until a week ago when he began to feel shortness of breath. Dyspnea worse when lying flat, felt like he was choking, and also worse on exertion with exercise tolerance decreasing from unlimited to 1 block. Endorses cough productive of yellow sputum and also endorses swelling in his legs, L>R, but states that has been going on for several months; is not sure if it has gotten worse recently. Denies decrease in urine output. Denies recent illness, chest pain, palpitations, diarrhea.   In Emergency Department, VSS, saturating well ORA. Left lower leg was noted to be more edematous than right, D-dimer elevated, but Duplex and CT Angio negative for PE. BNP elevated to 12,178, troponin 0.28, EKG w/o ischemic changes but Chest X-Ray with cardiomegaly, bilateral congestion, effusions and CT Angio with bilateral pleural effusions. Labs also notable for K 5.2, Cr 4.9 with BUN 65, anion gap 19. Admitted to medicine. Patient is a 59 year old male with history of chronic kidney disease IV not on hemodialysis, Coronary Artery Disease status post CABG, Diabetes Mellitus II, PVD presenting with shortness of breath for 1 week. Patient reports that he was in his usual state of health until a week ago when he began to feel shortness of breath. Dyspnea worse when lying flat, feels like he is choking, and also worse on exertion with exercise tolerance decreasing from unlimited to 1 block. No PND. Endorses cough productive of yellow sputum and also endorses swelling in his legs, L>R, but states that has been going on for several months; is not sure if it has gotten worse recently. Denies decrease in urine output. Denies recent illness, chest pain, palpitations, diarrhea.   In Emergency Department, VSS, saturating well ORA. Left lower leg was noted to be more edematous than right, D-dimer elevated, but Duplex and CT Angio negative for PE. BNP elevated to 12,178, troponin 0.28, EKG w/o ischemic changes but Chest X-Ray with cardiomegaly, bilateral congestion, effusions and CT Angio with bilateral pleural effusions. Labs also notable for K 5.2, Cr 4.9 with BUN 65, anion gap 19. Admitted to medicine.

## 2019-12-27 NOTE — H&P ADULT - ASSESSMENT
Patient is a 59 year old male with history of chronic kidney disease IV not on hemodialysis, Coronary Artery Disease status post CABG, Diabetes Mellitus II, PVD presenting with shortness of breath for 1 week.     #Acute CHF vs worsening CKD/ESRD  -BNP 12,000, +congestion on Chest X-Ray, bilateral effusions Chest X-Ray, CT  -Received 1L fluids in Emergency Department when received contrast, subsequently given 20 IV Lasix  --Hold Lasix at the moment as patient's respiratory status is stable and he had contrast today  --Ordered Echo, TSH, Free T3/T4  --Caution with fluids, 1L fluid restrict  --Nephro (Dr Luke Del Rosario), Cardio (Dr Alford) consults  --Has AVF placed by Dr Fernandez October 2019  --Monitor electrolytes carefully    #CAD status post CABG  -Trop 0.28 in Emergency Department   --Continue home ASA, Plavix, simvastatin  --Trend troponins    #PVD  -LLE edema caused by venous insufficiency worse in L than R?  -Dr Fernandez is patient's vascular surgeon, consider consult  --F/u official Duplex read    #DM  --Hold hold Bydureon  --Continue home glargine, added nutritional insulin  --Finger stick glucose before meals and at bedtime   --A1c ordered    #HTN  --Continue home metoprolol    #DVT PPX: subcutaneous heparin  #GI PPX: Not indicated  #Diet: DASH, CC, renal, 1L fluid restrict  #Activity: As tolerated  #Dispo: Back to home  #CODE: FULL Patient is a 59 year old male with history of chronic kidney disease IV not on hemodialysis, Coronary Artery Disease status post CABG, Diabetes Mellitus II, PVD presenting with shortness of breath for 1 week.     #Acute CHF vs worsening CKD/ESRD  -BNP 12,000, +congestion on Chest X-Ray, bilateral effusions Chest X-Ray, CT  -Received 1L fluids in Emergency Department when received contrast, subsequently given 20 IV Lasix  --Hold Lasix at the moment as patient's respiratory status is stable and he had contrast today  --Ordered Echo, TSH, Free T3/T4  --Caution with fluids, 1L fluid restrict  --Nephro (Dr Luke Del Rosario), Cardio (Dr Alford) consults  --Has AVF placed by Dr Fernandez October 2019  --Monitor electrolytes carefully    #CAD status post CABG  -Trop 0.28 in Emergency Department   --Continue home ASA, Plavix, simvastatin  --Trend troponins    #PVD  -LLE edema caused by venous insufficiency worse in L than R?  -Dr Fernandez is patient's vascular surgeon, consider consult  --F/u official Duplex read    #DM  --Hold hold Bydureon  --Continue home glargine, added nutritional insulin  --Finger stick glucose before meals and at bedtime   --A1c ordered    #HTN  --Continue home metoprolol    #Daily vomiting  -Due to uremia??  --Sent uric acid level  --Consider GI consult if persists    #DVT PPX: subcutaneous heparin  #GI PPX: Not indicated  #Diet: DASH, CC, renal, 1L fluid restrict  #Activity: As tolerated  #Dispo: Back to home  #CODE: FULL Patient is a 59 year old male with history of chronic kidney disease IV not on hemodialysis, Coronary Artery Disease status post CABG, Diabetes Mellitus II, PVD presenting with shortness of breath for 1 week.     #Acute CHF vs worsening CKD/ESRD  -BNP 12,000, +congestion on Chest X-Ray, bilateral effusions Chest X-Ray, CT  -Cr 4.9 with BUN 65, anion gap 19  -Received 1L fluids in Emergency Department when received contrast, subsequently given 20 IV Lasix  --Hold Lasix at the moment as patient's respiratory status is stable and he had contrast today  --Ordered Echo, TSH, Free T3/T4  --Caution with fluids, 1L fluid restrict  --Nephro (Dr Luke Del Rosario), Cardio (Dr Alford) consults  --Has AVF placed by Dr Fernandez October 2019  --Monitor electrolytes carefully    #CAD status post CABG  -Trop 0.28 in Emergency Department   --Continue home ASA, Plavix, simvastatin  --Trend troponins    #PVD  -LLE edema caused by venous insufficiency worse in L than R?  -Dr Fernandez is patient's vascular surgeon, consider consult  --F/u official Duplex read    #DM  --Hold hold Bydureon  --Continue home glargine, added nutritional insulin  --Finger stick glucose before meals and at bedtime   --A1c ordered    #HTN  --Continue home metoprolol    #Daily vomiting  -Due to uremia??  --Sent uric acid level  --Consider GI consult if persists    #Elevated Alk Phos  -132 on admission, previously normal  --PPI   --Monitor    #DVT PPX: subcutaneous heparin  #GI PPX: Protonix  #Diet: DASH, CC, renal, 1L fluid restrict  #Activity: As tolerated  #Dispo: Back to home  #CODE: FULL Patient is a 59 year old male with history of chronic kidney disease IV not on hemodialysis, Coronary Artery Disease status post CABG, Diabetes Mellitus II, PVD presenting with shortness of breath for 1 week.     #Acute CHF vs worsening CKD/ESRD  -BNP 12,000, +congestion on Chest X-Ray, bilateral effusions Chest X-Ray, CT  -Cr 4.9 with BUN 65, anion gap 19  -Received 1L fluids in Emergency Department when received contrast, subsequently given 20 IV Lasix  --Hold Lasix at the moment as patient's respiratory status is stable and he had contrast today  --Ordered Echo, TSH, Free T3/T4  --Caution with fluids, 1L fluid restrict  --Nephro (Dr Luke Del Rosario), Cardio (Dr Alford) consults  --Has AVF placed by Dr Fernandez October 2019  --Monitor electrolytes carefully    #CAD status post CABG  -Trop 0.28 in Emergency Department   --Continue home ASA, Plavix, simvastatin  --Trend troponins    #PVD  -LLE edema caused by venous insufficiency worse in L than R?  -Dr Fernandez is patient's vascular surgeon, consider consult  --F/u official Duplex read    #DM  --Hold hold Bydureon  --Continue home glargine, added nutritional insulin  --Finger stick glucose before meals and at bedtime   --A1c ordered    #HTN  --Continue home metoprolol    #Daily vomiting  -Due to uremia vs gastroparesis? peculiar that it is in the morning  --Sent uric acid level  --PPI   --Consider GI consult if persists    #Elevated Alk Phos  -132 on admission, previously normal  --Monitor    #DVT PPX: subcutaneous heparin  #GI PPX: Protonix  #Diet: DASH, CC, renal, 1L fluid restrict  #Activity: As tolerated  #Dispo: Back to home  #CODE: FULL Patient is a 59 year old male with history of chronic kidney disease IV not on hemodialysis, Coronary Artery Disease status post CABG, Diabetes Mellitus II, PVD presenting with shortness of breath for 1 week.     #Acute CHF vs worsening CKD/ESRD  -BNP 12,000, +congestion on Chest X-Ray, bilateral effusions Chest X-Ray, CT  -Cr 4.9 with BUN 65, anion gap 19, K 5.2  -Received 1L fluids in Emergency Department when received contrast, subsequently given 20 IV Lasix  --Hold Lasix at the moment as patient's respiratory status is stable and he had contrast today  --Ordered Echo, TSH, Free T3/T4  --Caution with fluids, 1L fluid restrict  --Daily weights, strict I's/O's  --Nephro (Dr Luke Del Rosario), Cardio (Dr Alford) consults  --Has AVF placed by Dr Fernandez October 2019  --Monitor electrolytes carefully    #CAD status post CABG  -Trop 0.28 in Emergency Department   --Continue home ASA, Plavix, simvastatin  --Trend troponins    #PVD  -LLE edema caused by venous insufficiency worse in L than R?  -Dr Fernandez is patient's vascular surgeon, consider consult  --F/u official Duplex read    #DM  --Hold hold Bydureon  --Continue home glargine, added nutritional insulin  --Finger stick glucose before meals and at bedtime   --A1c ordered    #HTN  --Continue home metoprolol    #Daily vomiting  -Due to uremia vs gastroparesis? peculiar that it is in the morning  --Sent uric acid level  --PPI   --Consider GI consult if persists    #Elevated Alk Phos  -132 on admission, previously normal  --Monitor    #DVT PPX: subcutaneous heparin  #GI PPX: Protonix  #Diet: DASH, CC, renal, 1L fluid restrict  #Activity: As tolerated  #Dispo: Back to home  #CODE: FULL Patient is a 59 year old male with history of chronic kidney disease IV not on hemodialysis, Coronary Artery Disease status post CABG, Diabetes Mellitus II, PVD presenting with shortness of breath for 1 week.     #Acute CHF vs worsening CKD/ESRD  -BNP 12,000, +congestion on Chest X-Ray, bilateral effusions on Chest X-Ray and CT  -Cr 4.9 with BUN 65, anion gap 19, K 5.2  -Received 1L fluids in Emergency Department when received contrast, subsequently given 20 IV Lasix  --Hold Lasix at the moment as patient's respiratory status is stable and he had contrast today  --Ordered Echo, TSH, Free T3/T4  --Caution with fluids, 1L fluid restrict  --Daily weights, strict I's/O's  --Nephro (Dr Luke Del Rosario), Cardio (Dr Alford) consults  --Has AVF placed by Dr Fernandez October 2019  --Monitor electrolytes carefully    #CAD status post CABG  -Trop 0.28 in Emergency Department   --Continue home ASA, Plavix, simvastatin  --Trend troponins    #PVD  -LLE edema caused by venous insufficiency worse in L than R?  -Dr Fernandez is patient's vascular surgeon, consider consult  --F/u official Duplex read    #DM  --Hold hold Bydureon  --Continue home glargine, added nutritional insulin  --Finger stick glucose before meals and at bedtime   --A1c ordered    #HTN  --Continue home metoprolol    #Daily vomiting  -Due to uremia vs gastroparesis? peculiar that it is in the morning  --Sent uric acid level  --PPI   --Consider GI consult if persists    #Elevated Alk Phos  -132 on admission, previously normal  --Monitor    #DVT PPX: subcutaneous heparin  #GI PPX: Protonix  #Diet: DASH, CC, renal, 1L fluid restrict  #Activity: As tolerated  #Dispo: Back to home  #CODE: FULL Patient is a 59 year old male with history of chronic kidney disease IV not on hemodialysis, Coronary Artery Disease status post CABG, Diabetes Mellitus II, PVD presenting with shortness of breath for 1 week.     #Acute CHF vs worsening CKD/ESRD  -BNP 12,000, +congestion on Chest X-Ray, bilateral effusions on Chest X-Ray and CT  -Cr 4.9 with BUN 65, anion gap 19, K 5.2  -Received 1L fluids in Emergency Department when received contrast, subsequently given 20 IV Lasix  --Hold Lasix at the moment as patient's respiratory status is stable and he had contrast today  --Ordered Echo, TSH, Free T3/T4  --Caution with fluids, 1L fluid restrict  --Daily weights, strict I's/O's  --Nephro (Dr Luke Del Rosario), Cardio (Dr Alford) consults  --Has AVF placed by Dr Fernandez October 2019  --Monitor electrolytes carefully    #CAD status post CABG  -Trop 0.28 in Emergency Department   --Continue home ASA, Plavix, simvastatin  --Trend troponins    #PVD  -LLE edema caused by venous insufficiency worse in L than R?  -Dr Fernandez is patient's vascular surgeon, consider consult  --F/u official Duplex read    #DM  --Hold hold Bydureon  --Continue home glargine, added nutritional insulin  --Finger stick glucose before meals and at bedtime   --A1c ordered    #HTN  --Continue home metoprolol    #Daily vomiting  -Due to uremia vs gastroparesis? peculiar that it is in the morning  --Sent uric acid level  --PPI trial  --Consider GI consult if persists    #Elevated Alk Phos  -132 on admission, previously normal  --Monitor    #Prolonged QT  -QTc 497  --Monitor QT  --Avoid QT-prolonging agents    #DVT PPX: subcutaneous heparin  #GI PPX: Protonix  #Diet: DASH, CC, renal, 1L fluid restrict  #Activity: As tolerated  #Dispo: Back to home  #CODE: FULL

## 2019-12-27 NOTE — ED PROVIDER NOTE - CARE PLAN
Principal Discharge DX:	Dyspnea on exertion  Secondary Diagnosis:	Elevated troponin  Secondary Diagnosis:	Fluid overload  Secondary Diagnosis:	Elevated brain natriuretic peptide (BNP) level

## 2019-12-27 NOTE — H&P ADULT - ATTENDING COMMENTS
59 year old male with history of CKD4, Coronary Artery Disease status post CABG, Diabetes Mellitus II, PVD presented with gradual worsening of shortness of breath in a period of 2 weeks. Pt ran out of Lasix about a month ago, but reports compliance with other medications. Denies chest pain, palpitations. SOB associated with orthopnea, worsening of LE edema, pt makes urine, but not much. Denies cough, fever, or URTI signs.  Pt feels better after diuresis, on RA, but not able to lay in bed.    T(C): 36.6  HR: 80  BP: 158/89  RR: 18  SpO2: 99%    Physical exam:  NAD,   HEENT: PERRL  NECK: supple, no JVD  RESP: basilar crackles, no rhonchi decreased breath sounds b/l  CVS: S1S2, RRR  GI: abdomen soft NT, ND  Extremities: left UE AVF with good thrill, b/l LE pitting edema L>R  NEURO: AOx3, no focal deficit  H/L: no enlarged LN noted     LABS: Creatinine, Serum: 5.2 (12-28)  Creatinine, Serum: 4.9 (12-27)    Hemoglobin: 9.7 (12-28)  Hemoglobin: 9.8 (12-27)    Potassium, Serum: 5.1 mmol/L (12.28.19 @ 05:38)    Troponin T, Serum: 0.27 ng/mL (12-28-19 @ 05:38)  Troponin T, Serum: 0.25 ng/mL (12-27-19 @ 17:39)  Troponin T, Serum: 0.28 ng/mL (12-27-19 @ 09:39)    Serum Pro-Brain Natriuretic Peptide: 15937 pg/mL (12.27.19 @ 09:39)    CXR: CHF pattern with small pleural effusions.    CTA chest: IMPRESSION:  1.  No pulmonary embolus.   2.  Moderate size bilateral pleural effusions and bibasilar atelectasis.  3.  Nonspecific prominent mediastinal and upper abdominal lymph nodes.    US duplex LE: No evidence of deep venous thrombosis in the bilateral lower extremities.  Right gastrocnemius vein is thrombosed.    EKG: NSR with 1st degree AV block    A/P: # Fluid overload secondary to medication noncompliance and CKD4.  - continue Lasix 40 mg IV QD for now  - monitor UO  - monitor electrolytes  - nephro eval  - ECHO pending  - cardio eval    # Pleural effusion - cont diuresis    # HTN, uncontrolled - add Nifedipine 30 mg QD, monitor BP    # CKD 4, doubt pt need HD now  - nephro eval  - monitor UO and electrolytes  - pt has viable fistula    # Right gastrocnemius vein thrombosis  - pt does not c/o pain, he ambulates freely, will not start AC.    # Chronic anemia - likely due to CKD  - check [employer]    # CAD, elevated troponin, doubt new ACS  - stable Troponin, no chest pain, no acute EKG changes  - cont home meds    DVT ppx    #Progress Note Handoff  Pending clinical improvement  Consults: cardio  Tests: ECHO, [employer]  Family Discussion: not needed, pt agreed with the plan  Future Disposition: home

## 2019-12-27 NOTE — H&P ADULT - NSHPREVIEWOFSYSTEMS_GEN_ALL_CORE
Endorses vomiting almost every morning for the past several months; it is yellow, nonbloody nonbilious.

## 2019-12-27 NOTE — ED ADULT NURSE REASSESSMENT NOTE - NS ED NURSE REASSESS COMMENT FT1
recieevd at 715 - a&o x3, ls + crackles at bases, abd soft, nt/nd, on cm, sr noted, vs as noted, dneies c/o att - left arm precaustions secondary to avf placement, + t/b noted, iv site wnl, awaits bed assignement

## 2019-12-27 NOTE — ED PROVIDER NOTE - PROGRESS NOTE DETAILS
Patient seen and evaluated by me. Labs/imaging ordered. Will also work up for PE/DVT given exam findings of unequal leg swelling and lungs otherwise clear. Will re-eval after work up.

## 2019-12-27 NOTE — PATIENT PROFILE ADULT - NSPROHMDIABETMGMTSTRAT_GEN_A_NUR
blood glucose testing medication therapy/insulin therapy/diet modification/blood glucose testing/coping strategies

## 2019-12-27 NOTE — ED PROVIDER NOTE - PHYSICAL EXAMINATION
Vital Signs: I have reviewed the initial vital signs.  Constitutional: NAD, well-nourished, appears stated age, no acute distress.  HEENT: Airway patent, moist MM, no erythema/swelling/deformity of oral structures. EOMI, PERRLA.  CV: regular rate, regular rhythm, well-perfused extremities, 2+ b/l DP and radial pulses equal. (+) 2+ pitting edema on left leg, which is markedly more swollen than the right. No pitting edema on the right.  Lungs: BCTA, no increased WOB. Speaking full sentences  ABD: NTND, no guarding or rebound, no pulsatile mass, no hernias.   MSK: Neck supple, nontender, nl ROM, no stepoff. Chest nontender. Back nontender in TLS spine or to b/l bony structures or flanks. Ext nontender, nl rom, no deformity.   INTEG: Skin warm, dry, no rash.  NEURO: A&Ox3, normal strength, nl sensation throughout, normal speech.   PSYCH: Calm, cooperative, normal affect and interaction.

## 2019-12-27 NOTE — ED PROVIDER NOTE - CLINICAL SUMMARY MEDICAL DECISION MAKING FREE TEXT BOX
Patient presented with worsening dyspnea on exertion x 1 week. Otherwise on arrival patient afebrile, HD stable, lungs clear overall on exam but (+) left leg swelling compared to right. Obtained EKG which was non-ischemic, no evidence of STEMI. CXR showed (+) congestion but no focal consolidations, no pneumothorax or other emergent pathologies. Obtained labs which were remarkable for elevated troponin to 0.20, and elevated d-dimer as well. Pro-bnp elevated indicating likely heart failure. Obtained duplex of his legs which was negative for DVT. CTA chest negative for PE but showed (+) bilateral effusions. Given lasix in ED IV (had been given slow fluid bolus s/p IV contrast infusion for CTA). Patient otherwise remained HD stable during ED course and asymptomatic at rest. Will admit for further monitoring and management. Patient agrees with plan. HD stable at time of admission.

## 2019-12-27 NOTE — H&P ADULT - NSHPLABSRESULTS_GEN_ALL_CORE
LABS:                        9.8    9.70  )-----------( 235      ( 27 Dec 2019 09:39 )             31.4     12-27    141  |  104  |  65<HH>  ----------------------------<  83  5.2<H>   |  18  |  4.9<HH>    Ca    7.9<L>      27 Dec 2019 09:39    TPro  7.0  /  Alb  3.9  /  TBili  0.3  /  DBili  x   /  AST  20  /  ALT  21  /  AlkPhos  132<H>  12-27    LIVER FUNCTIONS - ( 27 Dec 2019 09:39 )  Alb: 3.9 g/dL / Pro: 7.0 g/dL / ALK PHOS: 132 U/L / ALT: 21 U/L / AST: 20 U/L / GGT: x           PT/INR - ( 27 Dec 2019 09:39 )   PT: 13.00 sec;   INR: 1.13 ratio         PTT - ( 27 Dec 2019 09:39 )  PTT:33.6 sec      Troponin T, Serum: 0.28 ng/mL <HH> (12-27-19 @ 09:39)      CARDIAC MARKERS ( 27 Dec 2019 09:39 )  x     / 0.28 ng/mL / x     / x     / x          EKG:  Unusual P axis, possible ectopic atrial rhythm  Nonspecific T wave abnormality Lateral ischemia Possible  Poor R wave progression  QTc 497    Radiology:  Chest X-Ray: CHF pattern with small pleural effusions.  CT Chest Angio: No pulmonary embolus. Moderate size bilateral pleural effusions and bibasilar atelectasis. Nonspecific prominent mediastinal and upper abdominal lymph nodes.

## 2019-12-27 NOTE — ED ADULT NURSE NOTE - OBJECTIVE STATEMENT
Pt presented with c/o BROWN x 1 week. Hx CABG, chronic LLE swelling. Denies cp, sob at this time. Alert and oriented x3. No obvious deformities or trauma noted.

## 2019-12-27 NOTE — H&P ADULT - NSHPPHYSICALEXAM_GEN_ALL_CORE
T(C): 36.3 (12-27-19 @ 09:05), Max: 36.3 (12-27-19 @ 09:05)  HR: 84 (12-27-19 @ 09:05) (84 - 84)  BP: 161/73 (12-27-19 @ 09:05) (161/73 - 161/73)  RR: 18 (12-27-19 @ 09:05) (18 - 18)  SpO2: 99% (12-27-19 @ 09:05) (99% - 99%)    GENERAL: NAD, well-developed, 59y sitting up in bed, wrapped in blanket  EENT: EOMI, conjunctiva and sclera clear, No nasal obstruction or discharge  RESPIRATORY: Decreased bibasilar breath sounds, no crackles or wheeze appreciated, breathing comfortably on room air, speaking full sentences  CARDIOVASCULAR: Regular rate and rhythm; No murmurs, rubs, or gallops, 2+ pitting edema BLE   GASTROINTESTINAL: Abdomen Soft, Nontender, Nondistended; Bowel sounds present  MUSCULOSKELETAL:  No cyanosis. LLE more edematous than right, no induration or drainage noted  PSYCH: AAOx3, affect appropriate  NEUROLOGY: non-focal, cognition intact, MAEE

## 2019-12-27 NOTE — ED PROVIDER NOTE - PMH
BPH (benign prostatic hyperplasia)    CAD (coronary artery disease)    Chronic anemia    Chronic kidney disease (CKD)  Stage IV  Diabetes mellitus    HLD (hyperlipidemia)    [place] (hard of hearing)    Hypertension    Myocardial infarction  2012  OA (osteoarthritis)    PAD (peripheral artery disease)    Pain in left knee  s/p fall  S/P CABG (coronary artery bypass graft)  x4  Stented coronary artery  in 2008  Transient ischemic attack (TIA)  2017; 2008

## 2019-12-28 LAB
ANION GAP SERPL CALC-SCNC: 18 MMOL/L — HIGH (ref 7–14)
BUN SERPL-MCNC: 70 MG/DL — CRITICAL HIGH (ref 10–20)
CALCIUM SERPL-MCNC: 7.9 MG/DL — LOW (ref 8.5–10.1)
CHLORIDE SERPL-SCNC: 105 MMOL/L — SIGNIFICANT CHANGE UP (ref 98–110)
CO2 SERPL-SCNC: 18 MMOL/L — SIGNIFICANT CHANGE UP (ref 17–32)
CREAT SERPL-MCNC: 5.2 MG/DL — CRITICAL HIGH (ref 0.7–1.5)
ESTIMATED AVERAGE GLUCOSE: 131 MG/DL — HIGH (ref 68–114)
GLUCOSE BLDC GLUCOMTR-MCNC: 100 MG/DL — HIGH (ref 70–99)
GLUCOSE BLDC GLUCOMTR-MCNC: 101 MG/DL — HIGH (ref 70–99)
GLUCOSE BLDC GLUCOMTR-MCNC: 209 MG/DL — HIGH (ref 70–99)
GLUCOSE BLDC GLUCOMTR-MCNC: 82 MG/DL — SIGNIFICANT CHANGE UP (ref 70–99)
GLUCOSE BLDC GLUCOMTR-MCNC: 90 MG/DL — SIGNIFICANT CHANGE UP (ref 70–99)
GLUCOSE SERPL-MCNC: 109 MG/DL — HIGH (ref 70–99)
HBA1C BLD-MCNC: 6.2 % — HIGH (ref 4–5.6)
HCT VFR BLD CALC: 31 % — LOW (ref 42–52)
HCV AB S/CO SERPL IA: 0.12 S/CO — SIGNIFICANT CHANGE UP (ref 0–0.99)
HCV AB SERPL-IMP: SIGNIFICANT CHANGE UP
HGB BLD-MCNC: 9.7 G/DL — LOW (ref 14–18)
MAGNESIUM SERPL-MCNC: 1.9 MG/DL — SIGNIFICANT CHANGE UP (ref 1.8–2.4)
MCHC RBC-ENTMCNC: 29.8 PG — SIGNIFICANT CHANGE UP (ref 27–31)
MCHC RBC-ENTMCNC: 31.3 G/DL — LOW (ref 32–37)
MCV RBC AUTO: 95.1 FL — HIGH (ref 80–94)
NRBC # BLD: 0 /100 WBCS — SIGNIFICANT CHANGE UP (ref 0–0)
PHOSPHATE SERPL-MCNC: 5.8 MG/DL — HIGH (ref 2.1–4.9)
PLATELET # BLD AUTO: 219 K/UL — SIGNIFICANT CHANGE UP (ref 130–400)
POTASSIUM SERPL-MCNC: 5.1 MMOL/L — HIGH (ref 3.5–5)
POTASSIUM SERPL-SCNC: 5.1 MMOL/L — HIGH (ref 3.5–5)
RBC # BLD: 3.26 M/UL — LOW (ref 4.7–6.1)
RBC # FLD: 12.6 % — SIGNIFICANT CHANGE UP (ref 11.5–14.5)
SODIUM SERPL-SCNC: 141 MMOL/L — SIGNIFICANT CHANGE UP (ref 135–146)
T3FREE SERPL-MCNC: 2.11 PG/ML — SIGNIFICANT CHANGE UP (ref 1.8–4.6)
T4 FREE SERPL-MCNC: 1.1 NG/DL — SIGNIFICANT CHANGE UP (ref 0.9–1.8)
TROPONIN T SERPL-MCNC: 0.27 NG/ML — CRITICAL HIGH
TSH SERPL-MCNC: 1.49 UIU/ML — SIGNIFICANT CHANGE UP (ref 0.27–4.2)
WBC # BLD: 9.73 K/UL — SIGNIFICANT CHANGE UP (ref 4.8–10.8)
WBC # FLD AUTO: 9.73 K/UL — SIGNIFICANT CHANGE UP (ref 4.8–10.8)

## 2019-12-28 PROCEDURE — 93306 TTE W/DOPPLER COMPLETE: CPT | Mod: 26

## 2019-12-28 PROCEDURE — 99223 1ST HOSP IP/OBS HIGH 75: CPT

## 2019-12-28 RX ORDER — FUROSEMIDE 40 MG
40 TABLET ORAL DAILY
Refills: 0 | Status: DISCONTINUED | OUTPATIENT
Start: 2019-12-28 | End: 2019-12-28

## 2019-12-28 RX ORDER — INSULIN GLARGINE 100 [IU]/ML
20 INJECTION, SOLUTION SUBCUTANEOUS ONCE
Refills: 0 | Status: COMPLETED | OUTPATIENT
Start: 2019-12-28 | End: 2019-12-28

## 2019-12-28 RX ORDER — FUROSEMIDE 40 MG
60 TABLET ORAL
Refills: 0 | Status: DISCONTINUED | OUTPATIENT
Start: 2019-12-28 | End: 2019-12-31

## 2019-12-28 RX ORDER — INFLUENZA VIRUS VACCINE 15; 15; 15; 15 UG/.5ML; UG/.5ML; UG/.5ML; UG/.5ML
0.5 SUSPENSION INTRAMUSCULAR ONCE
Refills: 0 | Status: DISCONTINUED | OUTPATIENT
Start: 2019-12-28 | End: 2020-01-04

## 2019-12-28 RX ORDER — NIFEDIPINE 30 MG
30 TABLET, EXTENDED RELEASE 24 HR ORAL DAILY
Refills: 0 | Status: DISCONTINUED | OUTPATIENT
Start: 2019-12-28 | End: 2020-01-02

## 2019-12-28 RX ORDER — FUROSEMIDE 40 MG
40 TABLET ORAL ONCE
Refills: 0 | Status: COMPLETED | OUTPATIENT
Start: 2019-12-28 | End: 2019-12-28

## 2019-12-28 RX ADMIN — Medication 50 MILLIGRAM(S): at 17:06

## 2019-12-28 RX ADMIN — Medication 30 MILLIGRAM(S): at 11:26

## 2019-12-28 RX ADMIN — Medication 2: at 16:39

## 2019-12-28 RX ADMIN — Medication 50 MILLIGRAM(S): at 06:10

## 2019-12-28 RX ADMIN — HEPARIN SODIUM 5000 UNIT(S): 5000 INJECTION INTRAVENOUS; SUBCUTANEOUS at 06:10

## 2019-12-28 RX ADMIN — Medication 12 UNIT(S): at 16:39

## 2019-12-28 RX ADMIN — INSULIN GLARGINE 20 UNIT(S): 100 INJECTION, SOLUTION SUBCUTANEOUS at 23:35

## 2019-12-28 RX ADMIN — CLOPIDOGREL BISULFATE 75 MILLIGRAM(S): 75 TABLET, FILM COATED ORAL at 11:26

## 2019-12-28 RX ADMIN — SIMVASTATIN 40 MILLIGRAM(S): 20 TABLET, FILM COATED ORAL at 22:41

## 2019-12-28 RX ADMIN — PANTOPRAZOLE SODIUM 40 MILLIGRAM(S): 20 TABLET, DELAYED RELEASE ORAL at 06:10

## 2019-12-28 RX ADMIN — Medication 81 MILLIGRAM(S): at 11:26

## 2019-12-28 RX ADMIN — CHLORHEXIDINE GLUCONATE 1 APPLICATION(S): 213 SOLUTION TOPICAL at 06:10

## 2019-12-28 RX ADMIN — HEPARIN SODIUM 5000 UNIT(S): 5000 INJECTION INTRAVENOUS; SUBCUTANEOUS at 17:06

## 2019-12-28 RX ADMIN — Medication 40 MILLIGRAM(S): at 11:25

## 2019-12-28 NOTE — CONSULT NOTE ADULT - ASSESSMENT
Pt with CKD stage 5, not on HD yet, AVF in place in anticipation of HD, DM, HTN, CAD, CHF presents with worsening SOB for 1 week. Admits to have ran out of lasix. No CP.  CTA neg for PE, b/l pleural effusions.    CKD 5 - no uremic sx, fluid overload present  -start lasix 60 mg IV q 12 hr  - strict Is and Os  -repeat CXR tomorrow-if CHF improved, can f/u with Dr Luke Del Rosario for OP HD  - if no improvement- will need to start HD on this admission  - may obtain hepatitis profile which will be needed for HD  - UA  Hyperkalemia - low K diet  Met acidosis - no bicarb for now since pt is fluid overloaded  Anemia - iron studies    Will follow

## 2019-12-28 NOTE — PROGRESS NOTE ADULT - ASSESSMENT
59 year old male with history of chronic kidney disease IV not on hemodialysis, Coronary Artery Disease status post CABG, Diabetes Mellitus II, PVD presenting with shortness of breath for 1 week.     #Acute CHF vs worsening CKD/ESRD  -BNP 12,000, +congestion on Chest X-Ray, bilateral effusions on Chest X-Ray and CT  -Cr 4.9 with BUN 65, anion gap 19, K 5.2  -Received 1L fluids in Emergency Department when received contrast, subsequently given 20 IV Lasix  -f/u Echo, TSH, Free T3/T4  -Caution with fluids, 1L fluid restrict  -Daily weights, strict I's/O's  -Nephro (Dr Luke Del Rosario), Cardio (Dr Alford) consults  -Has AVF placed by Dr Fernandez October 2019  -Monitor electrolytes carefully  -Lasix 40  -start home diuretics tomorrow   -cxr in am     #CAD status post CABG  -Trop stable   --Continue home ASA, Plavix, simvastatin    #PVD  -LLE edema caused by venous insufficiency worse in L than R  -duplex neg     #DM  --Hold hold Bydureon  --Continue home glargine, added nutritional insulin  --Finger stick glucose before meals and at bedtime   --A1c ordered    #HTN  --Continue home metoprolol    #Elevated Alk Phos  -132 on admission, previously normal  --Monitor    #Prolonged QT  - QTc 497  --Monitor QT  --Avoid QT-prolonging agents    #DVT PPX: subcutaneous heparin  #GI PPX: Protonix  #Diet: DASH, CC, renal, 1L fluid restrict  #Activity: As tolerated  #Dispo: Back to home  #CODE: FULL

## 2019-12-28 NOTE — CONSULT NOTE ADULT - SUBJECTIVE AND OBJECTIVE BOX
NEPHROLOGY CONSULTATION NOTE    Patient is a 59y Male whom presented to the hospital with SOB.  Pt ran out of diuretics a month ago,   Pt with CKD stage 4/5, DM, HTN, CAD, TIA, admitted with SOB.  Follows with Dr Luke Del Rosario, had AVF created in 10/2019  denies nausea, vomiting.  HAd CT with IV contrast in ED r/o PE and received 1 L IVF for the contrast after,  CXR b/l pl effusions.    PAST MEDICAL & SURGICAL HISTORY:  Chronic anemia  [place] (hard of hearing)  OA (osteoarthritis)  Pain in left knee: s/p fall  BPH (benign prostatic hyperplasia)  HLD (hyperlipidemia)  PAD (peripheral artery disease): S/p bypass left leg  Myocardial infarction: 2012  Stented coronary artery: in 2008  Hypertension  Chronic kidney disease (CKD): Stage IV  Transient ischemic attack (TIA): 2017; 2008  Diabetes mellitus  S/P CABG (coronary artery bypass graft): x4  CAD (coronary artery disease)  History of surgery: Left CEA (2017)  Left Pinkie toe Amputation (2014)  CABG x 4 (2012)  Card cath - stent (2008)    H/O arterial bypass of lower limb: Left Lower Extremity (2016)  S/P CABG (coronary artery bypass graft): 2012    Allergies:  No Known Allergies    Home Medications Reviewed  Hospital Medications:   MEDICATIONS  (STANDING):  aspirin enteric coated 81 milliGRAM(s) Oral daily  chlorhexidine 4% Liquid 1 Application(s) Topical <User Schedule>  clopidogrel Tablet 75 milliGRAM(s) Oral daily  dextrose 5%. 1000 milliLiter(s) (50 mL/Hr) IV Continuous <Continuous>  dextrose 50% Injectable 12.5 Gram(s) IV Push once  dextrose 50% Injectable 25 Gram(s) IV Push once  dextrose 50% Injectable 25 Gram(s) IV Push once  furosemide   Injectable 40 milliGRAM(s) IV Push daily  heparin  Injectable 5000 Unit(s) SubCutaneous every 12 hours  influenza   Vaccine 0.5 milliLiter(s) IntraMuscular once  insulin glargine Injectable (LANTUS) 40 Unit(s) SubCutaneous at bedtime  insulin lispro (HumaLOG) corrective regimen sliding scale   SubCutaneous three times a day before meals  insulin lispro Injectable (HumaLOG) 12 Unit(s) SubCutaneous three times a day before meals  metoprolol tartrate 50 milliGRAM(s) Oral two times a day  NIFEdipine XL 30 milliGRAM(s) Oral daily  pantoprazole    Tablet 40 milliGRAM(s) Oral before breakfast  simvastatin 40 milliGRAM(s) Oral at bedtime      SOCIAL HISTORY:  Denies ETOH,Smoking,   FAMILY HISTORY:  ESRD (end stage renal disease) on dialysis (Mother)  DM (diabetes mellitus) (Mother)  Family history of heart disease (Father)        REVIEW OF SYSTEMS:  CONSTITUTIONAL: No weakness, fevers or chills  EYES/ENT: No visual changes;  No vertigo or throat pain   NECK: No pain or stiffness  RESPIRATORY: No cough, wheezing, hemoptysis; Has shortness of breath  CARDIOVASCULAR: No chest pain or palpitations.  GASTROINTESTINAL: No abdominal or epigastric pain. No nausea, vomiting, or hematemesis; No diarrhea or constipation. No melena or hematochezia.  GENITOURINARY: No dysuria, frequency, foamy urine, urinary urgency, incontinence or hematuria  NEUROLOGICAL: No numbness or weakness  SKIN: No itching, burning, rashes, or lesions   VASCULAR: No bilateral lower extremity edema.   All other review of systems is negative unless indicated above.    VITALS:  T(F): 97.9 (12-28-19 @ 13:06), Max: 98.4 (12-27-19 @ 19:44)  HR: 80 (12-28-19 @ 13:06)  BP: 158/89 (12-28-19 @ 13:06)  RR: 18 (12-28-19 @ 06:14)  SpO2: 99% (12-28-19 @ 09:21)    12-28 @ 07:01  -  12-28 @ 16:20  --------------------------------------------------------  IN: 675 mL / OUT: 1275 mL / NET: -600 mL            I&O's Detail    28 Dec 2019 07:01  -  28 Dec 2019 16:20  --------------------------------------------------------  IN:    Oral Fluid: 675 mL  Total IN: 675 mL    OUT:    Voided: 1275 mL  Total OUT: 1275 mL    Total NET: -600 mL        Creatine Kinase, Serum: 231 U/L (12-27-19 @ 17:39)      PHYSICAL EXAM:  Constitutional: NAD  HEENT: anicteric sclera, oropharynx clear, MMM  Neck: No JVD  Respiratory: decreased BS b/l occ crackles  Cardiovascular: S1, S2, RRR  Gastrointestinal: BS+, soft, NT/ND  Extremities: Mild peripheral edema  Neurological: A/O x 3, no focal deficits  Psychiatric: Normal mood, normal affect  : No CVA tenderness. No schneider.   Skin: No rashes  Vascular Access: Lt AVF    LABS:  12-28    141  |  105  |  70<HH>  ----------------------------<  109<H>  5.1<H>   |  18  |  5.2<HH>    Ca    7.9<L>      28 Dec 2019 05:38  Phos  5.8     12-28  Mg     1.9     12-28    TPro  7.0  /  Alb  3.9  /  TBili  0.3  /  DBili      /  AST  20  /  ALT  21  /  AlkPhos  132<H>  12-27    Creatinine Trend: 5.2 <--, 4.9 <--                        9.7    9.73  )-----------( 219      ( 28 Dec 2019 05:38 )             31.0     Urine Studies:              RADIOLOGY & ADDITIONAL STUDIES:      < from: CT Angio Chest w/ IV Cont (12.27.19 @ 13:05) >  IMPRESSION:  1.  No pulmonary embolus.   2.  Moderate size bilateral pleural effusions and bibasilar atelectasis.  3.  Nonspecific prominent mediastinal and upper abdominal lymph nodes.    < end of copied text >  < from: Xray Chest 1 View-PORTABLE IMMEDIATE (12.27.19 @ 11:08) >  CHF pattern with small pleural effusions.    < end of copied text >

## 2019-12-28 NOTE — PROGRESS NOTE ADULT - SUBJECTIVE AND OBJECTIVE BOX
Hospital Day:  1d    Subjective:    Pt was interviewed and examined at the bedside in the AM. No acute events overnight.     Denies headaches, changes in vision, chest pains, SOB, n/v/d, abd pain, constipation, changes in urination, pain on urination, weakness, fatigue, joint pain or muscle pain.       Past Medical Hx:   Chronic anemia  [place] (hard of hearing)  OA (osteoarthritis)  Pain in left knee  BPH (benign prostatic hyperplasia)  Carotid artery disease  CAD (coronary artery disease)  HLD (hyperlipidemia)  PAD (peripheral artery disease)  Myocardial infarction  Stented coronary artery  Hypertension  Chronic kidney disease (CKD)  Transient ischemic attack (TIA)  Diabetes mellitus  Stroke  High cholesterol  S/P CABG (coronary artery bypass graft)  CAD (coronary artery disease)  Diabetes    Past Sx:  History of surgery  H/O arterial bypass of lower limb  S/P CABG (coronary artery bypass graft)    Allergies:  No Known Allergies    Current Meds:   Standng Meds:  aspirin enteric coated 81 milliGRAM(s) Oral daily  chlorhexidine 4% Liquid 1 Application(s) Topical <User Schedule>  clopidogrel Tablet 75 milliGRAM(s) Oral daily  dextrose 5%. 1000 milliLiter(s) (50 mL/Hr) IV Continuous <Continuous>  dextrose 50% Injectable 12.5 Gram(s) IV Push once  dextrose 50% Injectable 25 Gram(s) IV Push once  dextrose 50% Injectable 25 Gram(s) IV Push once  furosemide   Injectable 40 milliGRAM(s) IV Push daily  heparin  Injectable 5000 Unit(s) SubCutaneous every 12 hours  influenza   Vaccine 0.5 milliLiter(s) IntraMuscular once  insulin glargine Injectable (LANTUS) 40 Unit(s) SubCutaneous at bedtime  insulin lispro (HumaLOG) corrective regimen sliding scale   SubCutaneous three times a day before meals  insulin lispro Injectable (HumaLOG) 12 Unit(s) SubCutaneous three times a day before meals  metoprolol tartrate 50 milliGRAM(s) Oral two times a day  NIFEdipine XL 30 milliGRAM(s) Oral daily  pantoprazole    Tablet 40 milliGRAM(s) Oral before breakfast  simvastatin 40 milliGRAM(s) Oral at bedtime    PRN Meds:  acetaminophen   Tablet .. 650 milliGRAM(s) Oral every 6 hours PRN Mild Pain (1 - 3)  dextrose 40% Gel 15 Gram(s) Oral once PRN Blood Glucose LESS THAN 70 milliGRAM(s)/deciliter  glucagon  Injectable 1 milliGRAM(s) IntraMuscular once PRN Glucose LESS THAN 70 milligrams/deciliter  melatonin 10 milliGRAM(s) Oral at bedtime PRN Sleep  polyethylene glycol 3350 17 Gram(s) Oral daily PRN Constipation    HOME MEDICATIONS:  aspirin 81 mg oral tablet: 1 tab(s) orally once a day  Bydureon Pen 2 mg subcutaneous injection, extended release: 2 milligram(s) subcutaneous once a week  Levemir 100 units/mL subcutaneous solution: 40 unit(s) subcutaneous once a day (at bedtime). 20 units 10/23/19  Metoprolol Tartrate 50 mg oral tablet: 1 tab(s) orally 2 times a day  Plavix 75 mg oral tablet: 1 tab(s) orally once a day  simvastatin 40 mg oral tablet: 1 tab(s) orally once a day (at bedtime)      Vital Signs:   T(F): 96.5 (12-28-19 @ 06:14), Max: 98.4 (12-27-19 @ 19:44)  HR: 79 (12-28-19 @ 11:19) (79 - 84)  BP: 178/94 (12-28-19 @ 11:19) (152/74 - 184/92)  RR: 18 (12-28-19 @ 06:14) (18 - 18)  SpO2: 99% (12-28-19 @ 09:21) (97% - 99%)      12-28-19 @ 07:01  -  12-28-19 @ 11:58  --------------------------------------------------------  IN: 250 mL / OUT: 100 mL / NET: 150 mL        Physical Exam:   CONSTITUTIONAL: Well-developed; well-nourished; in no acute distress, speaking in full sentences  HEAD: Normocephalic; atraumatic  EYES: PERRL, EOMI, no conjunctival erythema  NECK: Supple; non tender, FROM  CARD: +S1, S2 Regular rate and rhythm.  RESP: CTABL  ABD: soft ntnd, no rebound, no guarding, no rigidity  EXT: moves all extremities,  LL edema   NEURO: Alert, oriented, grossly unremarkable, no focal deficits, cn ii-xii grossly intact  PSYCH: Cooperative, appropriate         Labs:                         9.7    9.73  )-----------( 219      ( 28 Dec 2019 05:38 )             31.0       28 Dec 2019 05:38    141    |  105    |  70     ----------------------------<  109    5.1     |  18     |  5.2      Ca    7.9        28 Dec 2019 05:38  Phos  5.8       28 Dec 2019 05:38  Mg     1.9       28 Dec 2019 05:38    TPro  7.0    /  Alb  3.9    /  TBili  0.3    /  DBili  x      /  AST  20     /  ALT  21     /  AlkPhos  132    27 Dec 2019 09:39            Serum Pro-Brain Natriuretic Peptide: 23058 pg/mL (12-27-19 @ 09:39)    Troponin 0.27, CKMB --, CK -- 12-28-19 @ 05:38  Troponin 0.25, CKMB 7.1,  12-27-19 @ 17:39  Troponin 0.28, CKMB --, CK -- 12-27-19 @ 09:39                Radiology:

## 2019-12-29 LAB
ALBUMIN SERPL ELPH-MCNC: 3.5 G/DL — SIGNIFICANT CHANGE UP (ref 3.5–5.2)
ALP SERPL-CCNC: 128 U/L — HIGH (ref 30–115)
ALT FLD-CCNC: 12 U/L — SIGNIFICANT CHANGE UP (ref 0–41)
ANION GAP SERPL CALC-SCNC: 17 MMOL/L — HIGH (ref 7–14)
AST SERPL-CCNC: 13 U/L — SIGNIFICANT CHANGE UP (ref 0–41)
BASOPHILS # BLD AUTO: 0.07 K/UL — SIGNIFICANT CHANGE UP (ref 0–0.2)
BASOPHILS NFR BLD AUTO: 0.7 % — SIGNIFICANT CHANGE UP (ref 0–1)
BILIRUB SERPL-MCNC: 0.2 MG/DL — SIGNIFICANT CHANGE UP (ref 0.2–1.2)
BUN SERPL-MCNC: 73 MG/DL — CRITICAL HIGH (ref 10–20)
CALCIUM SERPL-MCNC: 7.8 MG/DL — LOW (ref 8.5–10.1)
CHLORIDE SERPL-SCNC: 106 MMOL/L — SIGNIFICANT CHANGE UP (ref 98–110)
CO2 SERPL-SCNC: 18 MMOL/L — SIGNIFICANT CHANGE UP (ref 17–32)
CREAT SERPL-MCNC: 5.9 MG/DL — CRITICAL HIGH (ref 0.7–1.5)
EOSINOPHIL # BLD AUTO: 0.31 K/UL — SIGNIFICANT CHANGE UP (ref 0–0.7)
EOSINOPHIL NFR BLD AUTO: 3 % — SIGNIFICANT CHANGE UP (ref 0–8)
GLUCOSE BLDC GLUCOMTR-MCNC: 119 MG/DL — HIGH (ref 70–99)
GLUCOSE BLDC GLUCOMTR-MCNC: 128 MG/DL — HIGH (ref 70–99)
GLUCOSE BLDC GLUCOMTR-MCNC: 154 MG/DL — HIGH (ref 70–99)
GLUCOSE BLDC GLUCOMTR-MCNC: 80 MG/DL — SIGNIFICANT CHANGE UP (ref 70–99)
GLUCOSE SERPL-MCNC: 88 MG/DL — SIGNIFICANT CHANGE UP (ref 70–99)
HCT VFR BLD CALC: 30 % — LOW (ref 42–52)
HGB BLD-MCNC: 9.6 G/DL — LOW (ref 14–18)
IMM GRANULOCYTES NFR BLD AUTO: 0.5 % — HIGH (ref 0.1–0.3)
LYMPHOCYTES # BLD AUTO: 1.28 K/UL — SIGNIFICANT CHANGE UP (ref 1.2–3.4)
LYMPHOCYTES # BLD AUTO: 12.4 % — LOW (ref 20.5–51.1)
MAGNESIUM SERPL-MCNC: 1.9 MG/DL — SIGNIFICANT CHANGE UP (ref 1.8–2.4)
MCHC RBC-ENTMCNC: 30.1 PG — SIGNIFICANT CHANGE UP (ref 27–31)
MCHC RBC-ENTMCNC: 32 G/DL — SIGNIFICANT CHANGE UP (ref 32–37)
MCV RBC AUTO: 94 FL — SIGNIFICANT CHANGE UP (ref 80–94)
MONOCYTES # BLD AUTO: 0.72 K/UL — HIGH (ref 0.1–0.6)
MONOCYTES NFR BLD AUTO: 7 % — SIGNIFICANT CHANGE UP (ref 1.7–9.3)
NEUTROPHILS # BLD AUTO: 7.88 K/UL — HIGH (ref 1.4–6.5)
NEUTROPHILS NFR BLD AUTO: 76.4 % — HIGH (ref 42.2–75.2)
NRBC # BLD: 0 /100 WBCS — SIGNIFICANT CHANGE UP (ref 0–0)
PHOSPHATE SERPL-MCNC: 5.9 MG/DL — HIGH (ref 2.1–4.9)
PLATELET # BLD AUTO: 217 K/UL — SIGNIFICANT CHANGE UP (ref 130–400)
POTASSIUM SERPL-MCNC: 5.2 MMOL/L — HIGH (ref 3.5–5)
POTASSIUM SERPL-SCNC: 5.2 MMOL/L — HIGH (ref 3.5–5)
PROT SERPL-MCNC: 6.4 G/DL — SIGNIFICANT CHANGE UP (ref 6–8)
RBC # BLD: 3.19 M/UL — LOW (ref 4.7–6.1)
RBC # FLD: 12.6 % — SIGNIFICANT CHANGE UP (ref 11.5–14.5)
SODIUM SERPL-SCNC: 141 MMOL/L — SIGNIFICANT CHANGE UP (ref 135–146)
WBC # BLD: 10.31 K/UL — SIGNIFICANT CHANGE UP (ref 4.8–10.8)
WBC # FLD AUTO: 10.31 K/UL — SIGNIFICANT CHANGE UP (ref 4.8–10.8)

## 2019-12-29 PROCEDURE — 71045 X-RAY EXAM CHEST 1 VIEW: CPT | Mod: 26

## 2019-12-29 PROCEDURE — 99233 SBSQ HOSP IP/OBS HIGH 50: CPT

## 2019-12-29 RX ADMIN — HEPARIN SODIUM 5000 UNIT(S): 5000 INJECTION INTRAVENOUS; SUBCUTANEOUS at 17:17

## 2019-12-29 RX ADMIN — Medication 30 MILLIGRAM(S): at 05:14

## 2019-12-29 RX ADMIN — Medication 50 MILLIGRAM(S): at 17:17

## 2019-12-29 RX ADMIN — Medication 60 MILLIGRAM(S): at 17:16

## 2019-12-29 RX ADMIN — Medication 81 MILLIGRAM(S): at 11:15

## 2019-12-29 RX ADMIN — SIMVASTATIN 40 MILLIGRAM(S): 20 TABLET, FILM COATED ORAL at 22:42

## 2019-12-29 RX ADMIN — CLOPIDOGREL BISULFATE 75 MILLIGRAM(S): 75 TABLET, FILM COATED ORAL at 11:14

## 2019-12-29 RX ADMIN — INSULIN GLARGINE 40 UNIT(S): 100 INJECTION, SOLUTION SUBCUTANEOUS at 22:43

## 2019-12-29 RX ADMIN — Medication 60 MILLIGRAM(S): at 05:15

## 2019-12-29 RX ADMIN — Medication 1: at 17:15

## 2019-12-29 RX ADMIN — PANTOPRAZOLE SODIUM 40 MILLIGRAM(S): 20 TABLET, DELAYED RELEASE ORAL at 05:14

## 2019-12-29 RX ADMIN — Medication 50 MILLIGRAM(S): at 05:14

## 2019-12-29 RX ADMIN — HEPARIN SODIUM 5000 UNIT(S): 5000 INJECTION INTRAVENOUS; SUBCUTANEOUS at 05:14

## 2019-12-29 RX ADMIN — Medication 12 UNIT(S): at 17:15

## 2019-12-29 NOTE — PROGRESS NOTE ADULT - ASSESSMENT
Pt with CKD stage 5, not on HD yet, AVF in place in anticipation of HD, DM, HTN, CAD, CHF presents with worsening SOB for 1 week. Admits to have ran out of lasix. No CP.  CTA neg for PE, b/l pleural effusions.    CKD 5 - with fluid overload  will start HD today for 2 hrs 2 K bath   cc  -cont  lasix 60 mg IV q 12 hr  - strict Is and Os  - UA  MBD - check 25 OH vit D and iPTH  Hyperkalemia - low K diet  Met acidosis - no bicarb for now since pt is fluid overloaded  Anemia - iron studies  Will need set up for HD at [facility]  Will follow

## 2019-12-29 NOTE — PROGRESS NOTE ADULT - ASSESSMENT
59 year old male with history of CKD4, Coronary Artery Disease status post CABG, Diabetes Mellitus II, PVD presented with gradual worsening of shortness of breath in a period of 2 weeks. Pt ran out of Lasix about a month ago, but reports compliance with other medications.    # Fluid overload secondary to medication noncompliance and CKD4 and acute HFpEF.  - had first HD session today  - continue Lasix 60 IV BID  - monitor UO  - monitor electrolytes  - nephro f/u  - ECHO: 1. LV Ejection Fraction by Joes's Method with a biplane EF of 51 %.   2. Spectral Doppler shows pseudonormal pattern of left ventricular myocardial filling (Grade II diastolic dysfunction).   3. Moderatepleural effusion in the left lateral region.   4. Mild to moderate mitral valve regurgitation.   5. Mitral annular calcification.   6. Thickening and calcification of the anterior and posterior mitral valve leaflets.  - cardio eval pending    # Pleural effusion - cont diuresis    # HTN, uncontrolled - add Nifedipine 30 mg QD, monitor BP    # CKD 5  - s/p HD 12/29, pt will need a lot in OP HD  - pt has viable fistula    # Right gastrocnemius vein thrombosis  - pt does not c/o pain, he ambulates freely, will not start AC.    # Chronic anemia - likely due to CKD  - check Iron stores    # CAD, elevated troponin, doubt new ACS  - stable Troponin, no chest pain, no acute EKG changes  - cont home meds    DVT ppx    #Progress Note Handoff  Pending clinical improvement  Consults: cardio  Tests: electrolytes  Family Discussion: not needed, pt agreed with the plan  Future Disposition: home once OP HD established.

## 2019-12-29 NOTE — PROGRESS NOTE ADULT - SUBJECTIVE AND OBJECTIVE BOX
Nephrology progress note    Patient is seen and examined, events over the last 24 h noted .  c/o dyspnea on exertion and orthopnea despite being on LAsix and having good UO. Will initiate HD today via Lt AVF  Allergies:  No Known Allergies    Hospital Medications:   MEDICATIONS  (STANDING):  aspirin enteric coated 81 milliGRAM(s) Oral daily  chlorhexidine 4% Liquid 1 Application(s) Topical <User Schedule>  clopidogrel Tablet 75 milliGRAM(s) Oral daily  dextrose 5%. 1000 milliLiter(s) (50 mL/Hr) IV Continuous <Continuous>  dextrose 50% Injectable 12.5 Gram(s) IV Push once  dextrose 50% Injectable 25 Gram(s) IV Push once  dextrose 50% Injectable 25 Gram(s) IV Push once  furosemide   Injectable 60 milliGRAM(s) IV Push two times a day  heparin  Injectable 5000 Unit(s) SubCutaneous every 12 hours  influenza   Vaccine 0.5 milliLiter(s) IntraMuscular once  insulin glargine Injectable (LANTUS) 40 Unit(s) SubCutaneous at bedtime  insulin lispro (HumaLOG) corrective regimen sliding scale   SubCutaneous three times a day before meals  insulin lispro Injectable (HumaLOG) 12 Unit(s) SubCutaneous three times a day before meals  metoprolol tartrate 50 milliGRAM(s) Oral two times a day  NIFEdipine XL 30 milliGRAM(s) Oral daily  pantoprazole    Tablet 40 milliGRAM(s) Oral before breakfast  simvastatin 40 milliGRAM(s) Oral at bedtime        VITALS:  T(F): 97.2 (12-29-19 @ 05:42), Max: 97.9 (12-28-19 @ 13:06)  HR: 80 (12-29-19 @ 05:42)  BP: 177/82 (12-29-19 @ 05:42)  RR: 18 (12-29-19 @ 05:42)  SpO2: 95% (12-29-19 @ 07:21)  Wt(kg): --    12-28 @ 07:01  -  12-29 @ 07:00  --------------------------------------------------------  IN: 675 mL / OUT: 2175 mL / NET: -1500 mL    12-29 @ 07:01  -  12-29 @ 11:37  --------------------------------------------------------  IN: 0 mL / OUT: 200 mL / NET: -200 mL          PHYSICAL EXAM:  Constitutional: NAD  Respiratory: BS decreased at bases  Cardiovascular: S1, S2, RRR  Gastrointestinal: BS+, soft, NT/ND  Extremities: 2+ peripheral edema  Neurological: A/O x 3, no focal deficits  : No CVA tenderness. No schneider.   Skin: No rashes  Vascular Access: AVF    LABS:  12-29    141  |  106  |  73<HH>  ----------------------------<  88  5.2<H>   |  18  |  5.9<HH>    Ca    7.8<L>      29 Dec 2019 06:25  Phos  5.8     12-28  Mg     1.9     12-29    TPro  6.4  /  Alb  3.5  /  TBili  0.2  /  DBili      /  AST  13  /  ALT  12  /  AlkPhos  128<H>  12-29                          9.6    10.31 )-----------( 217      ( 29 Dec 2019 06:25 )             30.0       Urine Studies:      RADIOLOGY & ADDITIONAL STUDIES:

## 2019-12-29 NOTE — PROGRESS NOTE ADULT - SUBJECTIVE AND OBJECTIVE BOX
SCHWABACHER, LAWRENCE    Patient is a 59y old  Male who presents with a chief complaint of Dyspnea on exertion (29 Dec 2019 11:36)    INTERVAL HPI/OVERNIGHT EVENTS: Pt underwent HD today, was seen after HD, c/o feeling weak, no other complaints. SOB is better, but still orthopneic.     CONSTITUTIONAL: No weakness, fevers or chills  EYES/ENT: No visual changes;  No vertigo or throat pain   NECK: No pain or stiffness  RESPIRATORY: No cough, wheezing, hemoptysis; +orthopnea  CARDIOVASCULAR: No chest pain or palpitations, leg edema  GASTROINTESTINAL: No abdominal or epigastric pain. No nausea, vomiting, or hematemesis; No diarrhea or constipation. No melena or hematochezia.  GENITOURINARY: No dysuria, frequency or hematuria  NEUROLOGICAL: No numbness or weakness  SKIN: No itching, rashes     PHYSICAL EXAM:  T(C): 35.8, Max: 36.6 (12-28-19 @ 21:03)  HR: 87 (77 - 87)  BP: 158/74 (151/84 - 177/82)  RR: 18 (18 - 18)  SpO2: 95% (95% - 95%)    NAD,   HEENT: PERRL  NECK: supple, no JVD  RESP: basilar crackles, no rhonchi decreased breath sounds b/l  CVS: S1S2, RRR  GI: abdomen soft NT, ND  Extremities: left UE AVF with good thrill, b/l LE pitting edema L>R  NEURO: AOx3, no focal deficit  H/L: no enlarged LN noted     Consultant(s) Notes Reviewed by me.     LABS:                        9.6    10.31 )-----------( 217      ( 29 Dec 2019 06:25 )             30.0     12-29    141  |  106  |  73<HH>  ----------------------------<  88  5.2<H>   |  18  |  5.9<HH>    Ca    7.8<L>      29 Dec 2019 06:25  Phos  5.9     12-29  Mg     1.9     12-29    TPro  6.4  /  Alb  3.5  /  TBili  0.2  /  DBili  x   /  AST  13  /  ALT  12  /  AlkPhos  128<H>  12-29        CAPILLARY BLOOD GLUCOSE  POCT Blood Glucose.: 128 mg/dL (29 Dec 2019 11:11)  POCT Blood Glucose.: 80 mg/dL (29 Dec 2019 07:40)  POCT Blood Glucose.: 82 mg/dL (28 Dec 2019 23:14)  POCT Blood Glucose.: 101 mg/dL (28 Dec 2019 21:30)  POCT Blood Glucose.: 209 mg/dL (28 Dec 2019 16:19)        MEDICATIONS  (STANDING):  aspirin enteric coated 81 milliGRAM(s) Oral daily  chlorhexidine 4% Liquid 1 Application(s) Topical <User Schedule>  clopidogrel Tablet 75 milliGRAM(s) Oral daily  dextrose 5%. 1000 milliLiter(s) (50 mL/Hr) IV Continuous <Continuous>  dextrose 50% Injectable 12.5 Gram(s) IV Push once  dextrose 50% Injectable 25 Gram(s) IV Push once  dextrose 50% Injectable 25 Gram(s) IV Push once  furosemide   Injectable 60 milliGRAM(s) IV Push two times a day  heparin  Injectable 5000 Unit(s) SubCutaneous every 12 hours  influenza   Vaccine 0.5 milliLiter(s) IntraMuscular once  insulin glargine Injectable (LANTUS) 40 Unit(s) SubCutaneous at bedtime  insulin lispro (HumaLOG) corrective regimen sliding scale   SubCutaneous three times a day before meals  insulin lispro Injectable (HumaLOG) 12 Unit(s) SubCutaneous three times a day before meals  metoprolol tartrate 50 milliGRAM(s) Oral two times a day  NIFEdipine XL 30 milliGRAM(s) Oral daily  pantoprazole    Tablet 40 milliGRAM(s) Oral before breakfast  simvastatin 40 milliGRAM(s) Oral at bedtime    MEDICATIONS  (PRN):  acetaminophen   Tablet .. 650 milliGRAM(s) Oral every 6 hours PRN Mild Pain (1 - 3)  dextrose 40% Gel 15 Gram(s) Oral once PRN Blood Glucose LESS THAN 70 milliGRAM(s)/deciliter  glucagon  Injectable 1 milliGRAM(s) IntraMuscular once PRN Glucose LESS THAN 70 milligrams/deciliter  melatonin 10 milliGRAM(s) Oral at bedtime PRN Sleep  polyethylene glycol 3350 17 Gram(s) Oral daily PRN Constipation

## 2019-12-30 LAB
ALBUMIN SERPL ELPH-MCNC: 3.5 G/DL — SIGNIFICANT CHANGE UP (ref 3.5–5.2)
ALP SERPL-CCNC: 113 U/L — SIGNIFICANT CHANGE UP (ref 30–115)
ALT FLD-CCNC: 10 U/L — SIGNIFICANT CHANGE UP (ref 0–41)
ANION GAP SERPL CALC-SCNC: 17 MMOL/L — HIGH (ref 7–14)
AST SERPL-CCNC: 13 U/L — SIGNIFICANT CHANGE UP (ref 0–41)
BASOPHILS # BLD AUTO: 0.08 K/UL — SIGNIFICANT CHANGE UP (ref 0–0.2)
BASOPHILS NFR BLD AUTO: 0.9 % — SIGNIFICANT CHANGE UP (ref 0–1)
BILIRUB SERPL-MCNC: 0.2 MG/DL — SIGNIFICANT CHANGE UP (ref 0.2–1.2)
BUN SERPL-MCNC: 67 MG/DL — CRITICAL HIGH (ref 10–20)
CALCIUM SERPL-MCNC: 7.4 MG/DL — LOW (ref 8.4–10.5)
CALCIUM SERPL-MCNC: 7.7 MG/DL — LOW (ref 8.5–10.1)
CHLORIDE SERPL-SCNC: 103 MMOL/L — SIGNIFICANT CHANGE UP (ref 98–110)
CO2 SERPL-SCNC: 19 MMOL/L — SIGNIFICANT CHANGE UP (ref 17–32)
CREAT SERPL-MCNC: 5.6 MG/DL — CRITICAL HIGH (ref 0.7–1.5)
EOSINOPHIL # BLD AUTO: 0.27 K/UL — SIGNIFICANT CHANGE UP (ref 0–0.7)
EOSINOPHIL NFR BLD AUTO: 3 % — SIGNIFICANT CHANGE UP (ref 0–8)
GLUCOSE BLDC GLUCOMTR-MCNC: 111 MG/DL — HIGH (ref 70–99)
GLUCOSE BLDC GLUCOMTR-MCNC: 132 MG/DL — HIGH (ref 70–99)
GLUCOSE BLDC GLUCOMTR-MCNC: 73 MG/DL — SIGNIFICANT CHANGE UP (ref 70–99)
GLUCOSE BLDC GLUCOMTR-MCNC: 95 MG/DL — SIGNIFICANT CHANGE UP (ref 70–99)
GLUCOSE SERPL-MCNC: 95 MG/DL — SIGNIFICANT CHANGE UP (ref 70–99)
HBV CORE AB SER-ACNC: SIGNIFICANT CHANGE UP
HBV SURFACE AB SER-ACNC: <3 MIU/ML — LOW
HBV SURFACE AB SER-ACNC: SIGNIFICANT CHANGE UP
HBV SURFACE AG SER-ACNC: SIGNIFICANT CHANGE UP
HCT VFR BLD CALC: 27.4 % — LOW (ref 42–52)
HCV AB S/CO SERPL IA: 0.1 S/CO — SIGNIFICANT CHANGE UP (ref 0–0.99)
HCV AB SERPL-IMP: SIGNIFICANT CHANGE UP
HGB BLD-MCNC: 8.5 G/DL — LOW (ref 14–18)
IMM GRANULOCYTES NFR BLD AUTO: 0.4 % — HIGH (ref 0.1–0.3)
LYMPHOCYTES # BLD AUTO: 1.25 K/UL — SIGNIFICANT CHANGE UP (ref 1.2–3.4)
LYMPHOCYTES # BLD AUTO: 13.9 % — LOW (ref 20.5–51.1)
MAGNESIUM SERPL-MCNC: 1.8 MG/DL — SIGNIFICANT CHANGE UP (ref 1.8–2.4)
MCHC RBC-ENTMCNC: 29.1 PG — SIGNIFICANT CHANGE UP (ref 27–31)
MCHC RBC-ENTMCNC: 31 G/DL — LOW (ref 32–37)
MCV RBC AUTO: 93.8 FL — SIGNIFICANT CHANGE UP (ref 80–94)
MONOCYTES # BLD AUTO: 0.67 K/UL — HIGH (ref 0.1–0.6)
MONOCYTES NFR BLD AUTO: 7.4 % — SIGNIFICANT CHANGE UP (ref 1.7–9.3)
NEUTROPHILS # BLD AUTO: 6.71 K/UL — HIGH (ref 1.4–6.5)
NEUTROPHILS NFR BLD AUTO: 74.4 % — SIGNIFICANT CHANGE UP (ref 42.2–75.2)
NRBC # BLD: 0 /100 WBCS — SIGNIFICANT CHANGE UP (ref 0–0)
PLATELET # BLD AUTO: 204 K/UL — SIGNIFICANT CHANGE UP (ref 130–400)
POTASSIUM SERPL-MCNC: 5.2 MMOL/L — HIGH (ref 3.5–5)
POTASSIUM SERPL-SCNC: 5.2 MMOL/L — HIGH (ref 3.5–5)
PROT SERPL-MCNC: 6.3 G/DL — SIGNIFICANT CHANGE UP (ref 6–8)
PTH-INTACT FLD-MCNC: 224 PG/ML — HIGH (ref 15–65)
RBC # BLD: 2.92 M/UL — LOW (ref 4.7–6.1)
RBC # FLD: 12.5 % — SIGNIFICANT CHANGE UP (ref 11.5–14.5)
SODIUM SERPL-SCNC: 139 MMOL/L — SIGNIFICANT CHANGE UP (ref 135–146)
WBC # BLD: 9.02 K/UL — SIGNIFICANT CHANGE UP (ref 4.8–10.8)
WBC # FLD AUTO: 9.02 K/UL — SIGNIFICANT CHANGE UP (ref 4.8–10.8)

## 2019-12-30 PROCEDURE — 71046 X-RAY EXAM CHEST 2 VIEWS: CPT | Mod: 26

## 2019-12-30 PROCEDURE — 99233 SBSQ HOSP IP/OBS HIGH 50: CPT

## 2019-12-30 RX ORDER — SEVELAMER CARBONATE 2400 MG/1
800 POWDER, FOR SUSPENSION ORAL
Refills: 0 | Status: DISCONTINUED | OUTPATIENT
Start: 2019-12-30 | End: 2020-01-04

## 2019-12-30 RX ADMIN — SIMVASTATIN 40 MILLIGRAM(S): 20 TABLET, FILM COATED ORAL at 22:38

## 2019-12-30 RX ADMIN — CLOPIDOGREL BISULFATE 75 MILLIGRAM(S): 75 TABLET, FILM COATED ORAL at 11:34

## 2019-12-30 RX ADMIN — Medication 12 UNIT(S): at 11:54

## 2019-12-30 RX ADMIN — Medication 81 MILLIGRAM(S): at 11:34

## 2019-12-30 RX ADMIN — PANTOPRAZOLE SODIUM 40 MILLIGRAM(S): 20 TABLET, DELAYED RELEASE ORAL at 06:11

## 2019-12-30 RX ADMIN — Medication 50 MILLIGRAM(S): at 16:57

## 2019-12-30 RX ADMIN — Medication 60 MILLIGRAM(S): at 06:11

## 2019-12-30 RX ADMIN — Medication 30 MILLIGRAM(S): at 06:11

## 2019-12-30 RX ADMIN — INSULIN GLARGINE 40 UNIT(S): 100 INJECTION, SOLUTION SUBCUTANEOUS at 22:38

## 2019-12-30 RX ADMIN — Medication 12 UNIT(S): at 16:57

## 2019-12-30 RX ADMIN — Medication 60 MILLIGRAM(S): at 17:04

## 2019-12-30 RX ADMIN — Medication 50 MILLIGRAM(S): at 06:11

## 2019-12-30 RX ADMIN — SEVELAMER CARBONATE 800 MILLIGRAM(S): 2400 POWDER, FOR SUSPENSION ORAL at 17:04

## 2019-12-30 NOTE — PROGRESS NOTE ADULT - ASSESSMENT
Pt with CKD stage 5, not on HD yet, AVF in place in anticipation of HD, DM, HTN, CAD, CHF presents with worsening SOB for 1 week. Admits to have ran out of lasix. No CP.  CTA neg for PE, b/l pleural effusions.    CKD 5 - with fluid overload  HD yesterday, infiltrated , jason lrest AVf today  -cont  lasix 60 mg IV q 12 hr  - strict Is and Os  - UA  MBD - check 25 OH vit D and iPTH  Hyperkalemia - low K diet  Met acidosis - no bicarb for now since pt is fluid overloaded  start sevelamer 1 tab PO TID w/ meals for hyperphos  Anemia - iron studies  Will need set up for HD at [facility]  Will follow

## 2019-12-30 NOTE — CONSULT NOTE ADULT - SUBJECTIVE AND OBJECTIVE BOX
Patient is a 59y old  Male who presents with a chief complaint of Dyspnea on exertion (29 Dec 2019 16:00)      HPI:  Patient is a 59 year old male with history of chronic kidney disease IV not on hemodialysis, Coronary Artery Disease status post CABG, Diabetes Mellitus II, PVD presenting with shortness of breath for 1 week. Patient reports that he was in his usual state of health until a week ago when he began to feel shortness of breath. Dyspnea worse when lying flat, feels like he is choking, and also worse on exertion with exercise tolerance decreasing from unlimited to 1 block. No PND. Endorses cough productive of yellow sputum and also endorses swelling in his legs, L>R, but states that has been going on for several months; is not sure if it has gotten worse recently. Denies decrease in urine output. Denies recent illness, chest pain, palpitations, diarrhea.   In Emergency Department, VSS, saturating well ORA. Left lower leg was noted to be more edematous than right, D-dimer elevated, but Duplex and CT Angio negative for PE. BNP elevated to 12,178, troponin 0.28, EKG w/o ischemic changes but Chest X-Ray with cardiomegaly, bilateral congestion, effusions and CT Angio with bilateral pleural effusions. Labs also notable for K 5.2, Cr 4.9 with BUN 65, anion gap 19. Admitted to medicine. (27 Dec 2019 14:49)      PAST MEDICAL & SURGICAL HISTORY:  Chronic anemia  [place] (hard of hearing)  OA (osteoarthritis)  Pain in left knee: s/p fall  BPH (benign prostatic hyperplasia)  HLD (hyperlipidemia)  PAD (peripheral artery disease): S/p bypass left leg  Myocardial infarction: 2012  Stented coronary artery: in 2008  Hypertension  Chronic kidney disease (CKD): Stage IV  Transient ischemic attack (TIA): 2017; 2008  Diabetes mellitus  S/P CABG (coronary artery bypass graft): x4  CAD (coronary artery disease)  History of surgery: Left CEA (2017)  Left Pinkie toe Amputation (2014)  CABG x 4 (2012)  Card cath - stent (2008)    H/O arterial bypass of lower limb: Left Lower Extremity (2016)  S/P CABG (coronary artery bypass graft): 2012      PREVIOUS DIAGNOSTIC TESTING:      ECHO  FINDINGS:    STRESS  FINDINGS:    CATHETERIZATION  FINDINGS:    MEDICATIONS  (STANDING):  aspirin enteric coated 81 milliGRAM(s) Oral daily  chlorhexidine 4% Liquid 1 Application(s) Topical <User Schedule>  clopidogrel Tablet 75 milliGRAM(s) Oral daily  dextrose 5%. 1000 milliLiter(s) (50 mL/Hr) IV Continuous <Continuous>  dextrose 50% Injectable 12.5 Gram(s) IV Push once  dextrose 50% Injectable 25 Gram(s) IV Push once  dextrose 50% Injectable 25 Gram(s) IV Push once  furosemide   Injectable 60 milliGRAM(s) IV Push two times a day  heparin  Injectable 5000 Unit(s) SubCutaneous every 12 hours  influenza   Vaccine 0.5 milliLiter(s) IntraMuscular once  insulin glargine Injectable (LANTUS) 40 Unit(s) SubCutaneous at bedtime  insulin lispro (HumaLOG) corrective regimen sliding scale   SubCutaneous three times a day before meals  insulin lispro Injectable (HumaLOG) 12 Unit(s) SubCutaneous three times a day before meals  metoprolol tartrate 50 milliGRAM(s) Oral two times a day  NIFEdipine XL 30 milliGRAM(s) Oral daily  pantoprazole    Tablet 40 milliGRAM(s) Oral before breakfast  simvastatin 40 milliGRAM(s) Oral at bedtime    MEDICATIONS  (PRN):  acetaminophen   Tablet .. 650 milliGRAM(s) Oral every 6 hours PRN Mild Pain (1 - 3)  dextrose 40% Gel 15 Gram(s) Oral once PRN Blood Glucose LESS THAN 70 milliGRAM(s)/deciliter  glucagon  Injectable 1 milliGRAM(s) IntraMuscular once PRN Glucose LESS THAN 70 milligrams/deciliter  melatonin 10 milliGRAM(s) Oral at bedtime PRN Sleep  polyethylene glycol 3350 17 Gram(s) Oral daily PRN Constipation      FAMILY HISTORY:  ESRD (end stage renal disease) on dialysis (Mother)  DM (diabetes mellitus) (Mother)  Family history of heart disease (Father)      SOCIAL HISTORY:  CIGARETTES:    ALCOHOL:    REVIEW OF SYSTEMS:  CONSTITUTIONAL: No fever, weight loss, or fatigue  NECK: No pain or stiffness  RESPIRATORY: No cough, wheezing, chills or hemoptysis; No shortness of breath  CARDIOVASCULAR: No chest pain, palpitations, dizziness, or leg swelling  GASTROINTESTINAL: No abdominal or epigastric pain. No nausea, vomiting, or hematemesis; No diarrhea or constipation. No melena or hematochezia.  GENITOURINARY: No dysuria, frequency, hematuria, or incontinence  NEUROLOGICAL: No headaches, memory loss, loss of strength, numbness, or tremors  SKIN: No itching, burning, rashes, or lesions   ENDOCRINE: No heat or cold intolerance; No hair loss  MUSCULOSKELETAL: No joint pain or swelling; No muscle, back, or extremity pain  HEME/LYMPH: No easy bruising, or bleeding gums          Vital Signs Last 24 Hrs  T(C): 36.4 (30 Dec 2019 05:20), Max: 36.4 (30 Dec 2019 05:20)  T(F): 97.6 (30 Dec 2019 05:20), Max: 97.6 (30 Dec 2019 05:20)  HR: 87 (30 Dec 2019 05:20) (77 - 87)  BP: 154/74 (30 Dec 2019 05:20) (148/72 - 158/74)  BP(mean): --  RR: 18 (30 Dec 2019 05:20) (18 - 18)  SpO2: --        PHYSICAL EXAM:  GENERAL: NAD, well-groomed, well-developed  HEAD:  Atraumatic, Normocephalic  NECK: Supple, No JVD, Normal thyroid  NERVOUS SYSTEM:  Alert & Oriented X3, Good concentration  CHEST/LUNG: Clear to percussion bilaterally; No rales, rhonchi, wheezing, or rubs  HEART: Regular rate and rhythm; No murmurs, rubs, or gallops  ABDOMEN: Soft, Nontender, Nondistended; Bowel sounds present  EXTREMITIES:  2+ Peripheral Pulses, No clubbing, cyanosis, or edema  SKIN: No rashes or lesions    INTERPRETATION OF TELEMETRY:    ECG:    I&O's Detail    29 Dec 2019 07:01  -  30 Dec 2019 07:00  --------------------------------------------------------  IN:    Oral Fluid: 240 mL  Total IN: 240 mL    OUT:    Other: 300 mL    Voided: 800 mL  Total OUT: 1100 mL    Total NET: -860 mL          LABS:                        8.5    9.02  )-----------( 204      ( 30 Dec 2019 05:40 )             27.4     12-30    139  |  103  |  67<HH>  ----------------------------<  95  5.2<H>   |  19  |  5.6<HH>    Ca    7.7<L>      30 Dec 2019 05:40  Phos  5.9     12-29  Mg     1.8     12-30    TPro  6.3  /  Alb  3.5  /  TBili  0.2  /  DBili  x   /  AST  13  /  ALT  10  /  AlkPhos  113  12-30            I&O's Summary    29 Dec 2019 07:01  -  30 Dec 2019 07:00  --------------------------------------------------------  IN: 240 mL / OUT: 1100 mL / NET: -860 mL        RADIOLOGY & ADDITIONAL STUDIES:

## 2019-12-30 NOTE — PROGRESS NOTE ADULT - SUBJECTIVE AND OBJECTIVE BOX
Nephrology progress note    Patient was seen and examined, events over the last 24 h noted .  HD yesterday    Allergies:  No Known Allergies    Hospital Medications:   MEDICATIONS  (STANDING):  aspirin enteric coated 81 milliGRAM(s) Oral daily  chlorhexidine 4% Liquid 1 Application(s) Topical <User Schedule>  clopidogrel Tablet 75 milliGRAM(s) Oral daily  dextrose 5%. 1000 milliLiter(s) (50 mL/Hr) IV Continuous <Continuous>  dextrose 50% Injectable 12.5 Gram(s) IV Push once  dextrose 50% Injectable 25 Gram(s) IV Push once  dextrose 50% Injectable 25 Gram(s) IV Push once  furosemide   Injectable 60 milliGRAM(s) IV Push two times a day  heparin  Injectable 5000 Unit(s) SubCutaneous every 12 hours  influenza   Vaccine 0.5 milliLiter(s) IntraMuscular once  insulin glargine Injectable (LANTUS) 40 Unit(s) SubCutaneous at bedtime  insulin lispro (HumaLOG) corrective regimen sliding scale   SubCutaneous three times a day before meals  insulin lispro Injectable (HumaLOG) 12 Unit(s) SubCutaneous three times a day before meals  metoprolol tartrate 50 milliGRAM(s) Oral two times a day  NIFEdipine XL 30 milliGRAM(s) Oral daily  pantoprazole    Tablet 40 milliGRAM(s) Oral before breakfast  simvastatin 40 milliGRAM(s) Oral at bedtime        VITALS:  T(F): 97.6 (12-30-19 @ 05:20), Max: 97.6 (12-30-19 @ 05:20)  HR: 87 (12-30-19 @ 05:20)  BP: 154/74 (12-30-19 @ 05:20)  RR: 18 (12-30-19 @ 05:20)  SpO2: --  Wt(kg): --    12-28 @ 07:01  -  12-29 @ 07:00  --------------------------------------------------------  IN: 675 mL / OUT: 2175 mL / NET: -1500 mL    12-29 @ 07:01  -  12-30 @ 07:00  --------------------------------------------------------  IN: 240 mL / OUT: 1100 mL / NET: -860 mL    12-30 @ 07:01  -  12-30 @ 11:24  --------------------------------------------------------  IN: 0 mL / OUT: 200 mL / NET: -200 mL          PHYSICAL EXAM:  Constitutional: NAD  Respiratory: BS decreased at bases  Cardiovascular: S1, S2, RRR  Gastrointestinal: BS+, soft, NT/ND  Extremities: 2+ peripheral edema  Neurological: A/O x 3, no focal deficits  : No CVA tenderness. No schneider.   Skin: No rashes  Vascular Access: AVF  LABS:  12-30    139  |  103  |  67<HH>  ----------------------------<  95  5.2<H>   |  19  |  5.6<HH>    Ca    7.7<L>      30 Dec 2019 05:40  Phos  5.9     12-29  Mg     1.8     12-30    TPro  6.3  /  Alb  3.5  /  TBili  0.2  /  DBili      /  AST  13  /  ALT  10  /  AlkPhos  113  12-30                          8.5    9.02  )-----------( 204      ( 30 Dec 2019 05:40 )             27.4       Urine Studies:      RADIOLOGY & ADDITIONAL STUDIES:

## 2019-12-30 NOTE — PROGRESS NOTE ADULT - SUBJECTIVE AND OBJECTIVE BOX
Hospital Day:  3d    Subjective:    Patient is a 59y old Male who presents with a chief complaint of Dyspnea on exertion (30 Dec 2019 11:18)  This morning patient is sitting up comfortably in bed. He reports he stills has orthopnea and coughing fits when he lays down and sleeps sitting up. Otherwise he denies any new complaints.    Past Medical Hx:   Chronic anemia  [place] (hard of hearing)  OA (osteoarthritis)  Pain in left knee  BPH (benign prostatic hyperplasia)  Carotid artery disease  CAD (coronary artery disease)  HLD (hyperlipidemia)  PAD (peripheral artery disease)  Myocardial infarction  Stented coronary artery  Hypertension  Chronic kidney disease (CKD)  Transient ischemic attack (TIA)  Diabetes mellitus  Stroke  High cholesterol  S/P CABG (coronary artery bypass graft)  CAD (coronary artery disease)  Diabetes    Past Sx:  History of surgery  H/O arterial bypass of lower limb  S/P CABG (coronary artery bypass graft)    Allergies:  No Known Allergies    Current Meds:   Standng Meds:  aspirin enteric coated 81 milliGRAM(s) Oral daily  chlorhexidine 4% Liquid 1 Application(s) Topical <User Schedule>  clopidogrel Tablet 75 milliGRAM(s) Oral daily  dextrose 5%. 1000 milliLiter(s) (50 mL/Hr) IV Continuous <Continuous>  dextrose 50% Injectable 12.5 Gram(s) IV Push once  dextrose 50% Injectable 25 Gram(s) IV Push once  dextrose 50% Injectable 25 Gram(s) IV Push once  furosemide   Injectable 60 milliGRAM(s) IV Push two times a day  heparin  Injectable 5000 Unit(s) SubCutaneous every 12 hours  influenza   Vaccine 0.5 milliLiter(s) IntraMuscular once  insulin glargine Injectable (LANTUS) 40 Unit(s) SubCutaneous at bedtime  insulin lispro (HumaLOG) corrective regimen sliding scale   SubCutaneous three times a day before meals  insulin lispro Injectable (HumaLOG) 12 Unit(s) SubCutaneous three times a day before meals  metoprolol tartrate 50 milliGRAM(s) Oral two times a day  NIFEdipine XL 30 milliGRAM(s) Oral daily  pantoprazole    Tablet 40 milliGRAM(s) Oral before breakfast  simvastatin 40 milliGRAM(s) Oral at bedtime    PRN Meds:  acetaminophen   Tablet .. 650 milliGRAM(s) Oral every 6 hours PRN Mild Pain (1 - 3)  dextrose 40% Gel 15 Gram(s) Oral once PRN Blood Glucose LESS THAN 70 milliGRAM(s)/deciliter  glucagon  Injectable 1 milliGRAM(s) IntraMuscular once PRN Glucose LESS THAN 70 milligrams/deciliter  melatonin 10 milliGRAM(s) Oral at bedtime PRN Sleep  polyethylene glycol 3350 17 Gram(s) Oral daily PRN Constipation    HOME MEDICATIONS:  aspirin 81 mg oral tablet: 1 tab(s) orally once a day  Bydureon Pen 2 mg subcutaneous injection, extended release: 2 milligram(s) subcutaneous once a week  Levemir 100 units/mL subcutaneous solution: 40 unit(s) subcutaneous once a day (at bedtime). 20 units 10/23/19  Metoprolol Tartrate 50 mg oral tablet: 1 tab(s) orally 2 times a day  Plavix 75 mg oral tablet: 1 tab(s) orally once a day  simvastatin 40 mg oral tablet: 1 tab(s) orally once a day (at bedtime)      Vital Signs:   T(F): 97.6 (12-30-19 @ 05:20), Max: 97.6 (12-30-19 @ 05:20)  HR: 87 (12-30-19 @ 05:20) (77 - 87)  BP: 154/74 (12-30-19 @ 05:20) (148/72 - 158/74)  RR: 18 (12-30-19 @ 05:20) (18 - 18)  SpO2: --      12-29-19 @ 07:01  -  12-30-19 @ 07:00  --------------------------------------------------------  IN: 240 mL / OUT: 1100 mL / NET: -860 mL    12-30-19 @ 07:01  -  12-30-19 @ 12:00  --------------------------------------------------------  IN: 0 mL / OUT: 200 mL / NET: -200 mL        Physical Exam:   GENERAL: NAD  HEENT: NCAT  CHEST/LUNG: CTAB  HEART: Regular rate and rhythm; s1 s2 appreciated, No murmurs, rubs, or gallops  ABDOMEN: Soft, Nontender, Nondistended; Bowel sounds present  EXTREMITIES: 2+ LE edema b/l  NERVOUS SYSTEM:  Alert & Oriented X3        Labs:                         8.5    9.02  )-----------( 204      ( 30 Dec 2019 05:40 )             27.4     Neutophil% 74.4, Lymphocyte% 13.9, Monocyte% 7.4, Bands% 0.4 12-30-19 @ 05:40    30 Dec 2019 05:40    139    |  103    |  67     ----------------------------<  95     5.2     |  19     |  5.6      Ca    7.7        30 Dec 2019 05:40  Phos  5.9       29 Dec 2019 13:05  Mg     1.8       30 Dec 2019 05:40    TPro  6.3    /  Alb  3.5    /  TBili  0.2    /  DBili  x      /  AST  13     /  ALT  10     /  AlkPhos  113    30 Dec 2019 05:40          Hemoglobin A1C, Whole Blood: 6.2 % (12-28-19 @ 05:38)    Serum Pro-Brain Natriuretic Peptide: 51446 pg/mL (12-27-19 @ 09:39)    Troponin 0.27, CKMB --, CK -- 12-28-19 @ 05:38  Troponin 0.25, CKMB 7.1,  12-27-19 @ 17:39  Troponin 0.28, CKMB --, CK -- 12-27-19 @ 09:39              Assessment and Plan:     59 year old male with history of chronic kidney disease IV not on hemodialysis, Coronary Artery Disease status post CABG, Diabetes Mellitus II, PVD presenting with shortness of breath for 1 week.     #Acute CHF vs worsening CKD/ESRD  - Patient not clinically improving on diuretics with uptrending Cr and worsening pleural effusions on Xray  -BNP 12,000 with bilateral effusions on Chest X-Ray and CT  -echo shows EF 51% with grade 2 diastolic dysfunction  -Caution with fluids, 1L fluid restrict  -Nephro following: Had HD 12/29, rest for 12/30, will need to set up for outpatient HD  -Cardio recs appreciated  -Continue Lasix 60    #CAD status post CABG  -Continue home ASA, Plavix, simvastatin    #PVD  -LLE edema caused by venous insufficiency worse in L than R  -duplex neg     #DM  -Basal/bolus  -A1c 6.2    #HTN  -Continue home metoprolol    #Elevated Alk Phos  -132 on admission, previously normal  -Monitor    #Prolonged QT  -Monitor QT  -Avoid QT-prolonging agents    #DVT PPX: subcutaneous heparin  #GI PPX: Protonix  #Diet: DASH, CC, renal, 1L fluid restrict  #Activity: As tolerated  #Dispo: Back to home  #CODE: FULL Hospital Day:  3d    Subjective:    Patient is a 59y old Male who presents with a chief complaint of Dyspnea on exertion (30 Dec 2019 11:18)  This morning patient is sitting up comfortably in bed. He reports he stills has orthopnea and coughing fits when he lays down and sleeps sitting up. Otherwise he denies any new complaints.    Past Medical Hx:   Chronic anemia  [place] (hard of hearing)  OA (osteoarthritis)  Pain in left knee  BPH (benign prostatic hyperplasia)  Carotid artery disease  CAD (coronary artery disease)  HLD (hyperlipidemia)  PAD (peripheral artery disease)  Myocardial infarction  Stented coronary artery  Hypertension  Chronic kidney disease (CKD)  Transient ischemic attack (TIA)  Diabetes mellitus  Stroke  High cholesterol  S/P CABG (coronary artery bypass graft)  CAD (coronary artery disease)  Diabetes    Past Sx:  History of surgery  H/O arterial bypass of lower limb  S/P CABG (coronary artery bypass graft)    Allergies:  No Known Allergies    Current Meds:   Standng Meds:  aspirin enteric coated 81 milliGRAM(s) Oral daily  chlorhexidine 4% Liquid 1 Application(s) Topical <User Schedule>  clopidogrel Tablet 75 milliGRAM(s) Oral daily  dextrose 5%. 1000 milliLiter(s) (50 mL/Hr) IV Continuous <Continuous>  dextrose 50% Injectable 12.5 Gram(s) IV Push once  dextrose 50% Injectable 25 Gram(s) IV Push once  dextrose 50% Injectable 25 Gram(s) IV Push once  furosemide   Injectable 60 milliGRAM(s) IV Push two times a day  heparin  Injectable 5000 Unit(s) SubCutaneous every 12 hours  influenza   Vaccine 0.5 milliLiter(s) IntraMuscular once  insulin glargine Injectable (LANTUS) 40 Unit(s) SubCutaneous at bedtime  insulin lispro (HumaLOG) corrective regimen sliding scale   SubCutaneous three times a day before meals  insulin lispro Injectable (HumaLOG) 12 Unit(s) SubCutaneous three times a day before meals  metoprolol tartrate 50 milliGRAM(s) Oral two times a day  NIFEdipine XL 30 milliGRAM(s) Oral daily  pantoprazole    Tablet 40 milliGRAM(s) Oral before breakfast  simvastatin 40 milliGRAM(s) Oral at bedtime    PRN Meds:  acetaminophen   Tablet .. 650 milliGRAM(s) Oral every 6 hours PRN Mild Pain (1 - 3)  dextrose 40% Gel 15 Gram(s) Oral once PRN Blood Glucose LESS THAN 70 milliGRAM(s)/deciliter  glucagon  Injectable 1 milliGRAM(s) IntraMuscular once PRN Glucose LESS THAN 70 milligrams/deciliter  melatonin 10 milliGRAM(s) Oral at bedtime PRN Sleep  polyethylene glycol 3350 17 Gram(s) Oral daily PRN Constipation    HOME MEDICATIONS:  aspirin 81 mg oral tablet: 1 tab(s) orally once a day  Bydureon Pen 2 mg subcutaneous injection, extended release: 2 milligram(s) subcutaneous once a week  Levemir 100 units/mL subcutaneous solution: 40 unit(s) subcutaneous once a day (at bedtime). 20 units 10/23/19  Metoprolol Tartrate 50 mg oral tablet: 1 tab(s) orally 2 times a day  Plavix 75 mg oral tablet: 1 tab(s) orally once a day  simvastatin 40 mg oral tablet: 1 tab(s) orally once a day (at bedtime)      Vital Signs:   T(F): 97.6 (12-30-19 @ 05:20), Max: 97.6 (12-30-19 @ 05:20)  HR: 87 (12-30-19 @ 05:20) (77 - 87)  BP: 154/74 (12-30-19 @ 05:20) (148/72 - 158/74)  RR: 18 (12-30-19 @ 05:20) (18 - 18)  SpO2: --      12-29-19 @ 07:01  -  12-30-19 @ 07:00  --------------------------------------------------------  IN: 240 mL / OUT: 1100 mL / NET: -860 mL    12-30-19 @ 07:01  -  12-30-19 @ 12:00  --------------------------------------------------------  IN: 0 mL / OUT: 200 mL / NET: -200 mL        Physical Exam:   GENERAL: NAD  HEENT: NCAT  Pulm CTAB  CV: Regular rate and rhythm; s1 s2 appreciated, No murmurs, rubs, or gallops  GI: Soft, Nontender, Nondistended; Bowel sounds present  EXTREMITIES: 2+ LE edema b/l  NERVOUS SYSTEM:  Alert & Oriented X3        Labs:                         8.5    9.02  )-----------( 204      ( 30 Dec 2019 05:40 )             27.4     Neutophil% 74.4, Lymphocyte% 13.9, Monocyte% 7.4, Bands% 0.4 12-30-19 @ 05:40    30 Dec 2019 05:40    139    |  103    |  67     ----------------------------<  95     5.2     |  19     |  5.6      Ca    7.7        30 Dec 2019 05:40  Phos  5.9       29 Dec 2019 13:05  Mg     1.8       30 Dec 2019 05:40    TPro  6.3    /  Alb  3.5    /  TBili  0.2    /  DBili  x      /  AST  13     /  ALT  10     /  AlkPhos  113    30 Dec 2019 05:40          Hemoglobin A1C, Whole Blood: 6.2 % (12-28-19 @ 05:38)    Serum Pro-Brain Natriuretic Peptide: 46441 pg/mL (12-27-19 @ 09:39)    Troponin 0.27, CKMB --, CK -- 12-28-19 @ 05:38  Troponin 0.25, CKMB 7.1,  12-27-19 @ 17:39  Troponin 0.28, CKMB --, CK -- 12-27-19 @ 09:39              Assessment and Plan:     59 year old male with history of chronic kidney disease IV not on hemodialysis, Coronary Artery Disease status post CABG, Diabetes Mellitus II, PVD presenting with shortness of breath for 1 week.     #SOB secondary to fluid overload secondary to CKD 5 and now requiring HD  - Patient not clinically improving on diuretics with uptrending Cr and worsening pleural effusions on Xray  -BNP 12,000 with bilateral effusions on Chest X-Ray and CT  -echo shows EF 51% with grade 2 diastolic dysfunction  -Caution with fluids, 1L fluid restrict  -Nephro following: Had HD 12/29, rest for 12/30, will need to set up for outpatient HD  -Cardio recs appreciated  -Continue Lasix 60    #CAD status post CABG  -Continue home ASA, Plavix, simvastatin    #PVD  -LLE edema caused by venous insufficiency worse in L than R  -duplex neg     #hyperphosphatemia start on binders sevelamir     #anemia check iron studies       #elevated troponins likely demand in the setting of CKD5   has history if CABG   on asa and plavix   no further intervention per cardiology   echo showed normal EF     #DM  -Basal/bolus  -A1c 6.2    #HTN  -Continue home metoprolol    #Elevated Alk Phos  -132 on admission, previously normal  -Monitor    #Prolonged QT improved down to 446 from 490s   -Monitor QT  -Avoid QT-prolonging agents    #DVT PPX: subcutaneous heparin  #GI PPX: Protonix  #Diet: DASH, CC, renal, 1L fluid restrict  #Activity: As tolerated  #Dispo: Back to home  #CODE: FULL

## 2019-12-31 LAB
ALBUMIN SERPL ELPH-MCNC: 3.4 G/DL — LOW (ref 3.5–5.2)
ALP SERPL-CCNC: 105 U/L — SIGNIFICANT CHANGE UP (ref 30–115)
ALT FLD-CCNC: 9 U/L — SIGNIFICANT CHANGE UP (ref 0–41)
ANION GAP SERPL CALC-SCNC: 20 MMOL/L — HIGH (ref 7–14)
AST SERPL-CCNC: 13 U/L — SIGNIFICANT CHANGE UP (ref 0–41)
BASOPHILS # BLD AUTO: 0.1 K/UL — SIGNIFICANT CHANGE UP (ref 0–0.2)
BASOPHILS NFR BLD AUTO: 1.3 % — HIGH (ref 0–1)
BILIRUB SERPL-MCNC: <0.2 MG/DL — SIGNIFICANT CHANGE UP (ref 0.2–1.2)
BUN SERPL-MCNC: 71 MG/DL — CRITICAL HIGH (ref 10–20)
CALCIUM SERPL-MCNC: 7.5 MG/DL — LOW (ref 8.5–10.1)
CHLORIDE SERPL-SCNC: 100 MMOL/L — SIGNIFICANT CHANGE UP (ref 98–110)
CO2 SERPL-SCNC: 20 MMOL/L — SIGNIFICANT CHANGE UP (ref 17–32)
CREAT SERPL-MCNC: 6.4 MG/DL — CRITICAL HIGH (ref 0.7–1.5)
EOSINOPHIL # BLD AUTO: 0.34 K/UL — SIGNIFICANT CHANGE UP (ref 0–0.7)
EOSINOPHIL NFR BLD AUTO: 4.3 % — SIGNIFICANT CHANGE UP (ref 0–8)
FERRITIN SERPL-MCNC: 113 NG/ML — SIGNIFICANT CHANGE UP (ref 30–400)
GLUCOSE BLDC GLUCOMTR-MCNC: 121 MG/DL — HIGH (ref 70–99)
GLUCOSE BLDC GLUCOMTR-MCNC: 137 MG/DL — HIGH (ref 70–99)
GLUCOSE BLDC GLUCOMTR-MCNC: 73 MG/DL — SIGNIFICANT CHANGE UP (ref 70–99)
GLUCOSE BLDC GLUCOMTR-MCNC: 84 MG/DL — SIGNIFICANT CHANGE UP (ref 70–99)
GLUCOSE SERPL-MCNC: 78 MG/DL — SIGNIFICANT CHANGE UP (ref 70–99)
HCT VFR BLD CALC: 26.9 % — LOW (ref 42–52)
HGB BLD-MCNC: 8.4 G/DL — LOW (ref 14–18)
IMM GRANULOCYTES NFR BLD AUTO: 0.4 % — HIGH (ref 0.1–0.3)
IRON SATN MFR SERPL: 26 % — SIGNIFICANT CHANGE UP (ref 15–50)
IRON SATN MFR SERPL: 59 UG/DL — SIGNIFICANT CHANGE UP (ref 35–150)
LYMPHOCYTES # BLD AUTO: 1.45 K/UL — SIGNIFICANT CHANGE UP (ref 1.2–3.4)
LYMPHOCYTES # BLD AUTO: 18.2 % — LOW (ref 20.5–51.1)
MCHC RBC-ENTMCNC: 29.4 PG — SIGNIFICANT CHANGE UP (ref 27–31)
MCHC RBC-ENTMCNC: 31.2 G/DL — LOW (ref 32–37)
MCV RBC AUTO: 94.1 FL — HIGH (ref 80–94)
MONOCYTES # BLD AUTO: 0.58 K/UL — SIGNIFICANT CHANGE UP (ref 0.1–0.6)
MONOCYTES NFR BLD AUTO: 7.3 % — SIGNIFICANT CHANGE UP (ref 1.7–9.3)
NEUTROPHILS # BLD AUTO: 5.47 K/UL — SIGNIFICANT CHANGE UP (ref 1.4–6.5)
NEUTROPHILS NFR BLD AUTO: 68.5 % — SIGNIFICANT CHANGE UP (ref 42.2–75.2)
NRBC # BLD: 0 /100 WBCS — SIGNIFICANT CHANGE UP (ref 0–0)
PHOSPHATE SERPL-MCNC: 5.8 MG/DL — HIGH (ref 2.1–4.9)
PLATELET # BLD AUTO: 203 K/UL — SIGNIFICANT CHANGE UP (ref 130–400)
POTASSIUM SERPL-MCNC: 4.6 MMOL/L — SIGNIFICANT CHANGE UP (ref 3.5–5)
POTASSIUM SERPL-SCNC: 4.6 MMOL/L — SIGNIFICANT CHANGE UP (ref 3.5–5)
PROT SERPL-MCNC: 6.1 G/DL — SIGNIFICANT CHANGE UP (ref 6–8)
RBC # BLD: 2.86 M/UL — LOW (ref 4.7–6.1)
RBC # FLD: 12.5 % — SIGNIFICANT CHANGE UP (ref 11.5–14.5)
SODIUM SERPL-SCNC: 140 MMOL/L — SIGNIFICANT CHANGE UP (ref 135–146)
TIBC SERPL-MCNC: 225 UG/DL — SIGNIFICANT CHANGE UP (ref 220–430)
TRANSFERRIN SERPL-MCNC: 175 MG/DL — LOW (ref 200–360)
UIBC SERPL-MCNC: 166 UG/DL — SIGNIFICANT CHANGE UP (ref 110–370)
WBC # BLD: 7.97 K/UL — SIGNIFICANT CHANGE UP (ref 4.8–10.8)
WBC # FLD AUTO: 7.97 K/UL — SIGNIFICANT CHANGE UP (ref 4.8–10.8)

## 2019-12-31 PROCEDURE — 99233 SBSQ HOSP IP/OBS HIGH 50: CPT

## 2019-12-31 RX ORDER — CALCIUM ACETATE 667 MG
667 TABLET ORAL
Refills: 0 | Status: DISCONTINUED | OUTPATIENT
Start: 2019-12-31 | End: 2020-01-02

## 2019-12-31 RX ORDER — SODIUM BICARBONATE 1 MEQ/ML
650 SYRINGE (ML) INTRAVENOUS EVERY 12 HOURS
Refills: 0 | Status: DISCONTINUED | OUTPATIENT
Start: 2019-12-31 | End: 2020-01-03

## 2019-12-31 RX ORDER — HEPARIN SODIUM 5000 [USP'U]/ML
5000 INJECTION INTRAVENOUS; SUBCUTANEOUS EVERY 8 HOURS
Refills: 0 | Status: DISCONTINUED | OUTPATIENT
Start: 2019-12-31 | End: 2020-01-04

## 2019-12-31 RX ORDER — INSULIN GLARGINE 100 [IU]/ML
30 INJECTION, SOLUTION SUBCUTANEOUS AT BEDTIME
Refills: 0 | Status: DISCONTINUED | OUTPATIENT
Start: 2019-12-31 | End: 2020-01-04

## 2019-12-31 RX ORDER — CALCITRIOL 0.5 UG/1
0.25 CAPSULE ORAL DAILY
Refills: 0 | Status: DISCONTINUED | OUTPATIENT
Start: 2019-12-31 | End: 2020-01-03

## 2019-12-31 RX ORDER — FERROUS SULFATE 325(65) MG
325 TABLET ORAL EVERY 8 HOURS
Refills: 0 | Status: DISCONTINUED | OUTPATIENT
Start: 2019-12-31 | End: 2020-01-04

## 2019-12-31 RX ORDER — CALCIUM CARBONATE 500(1250)
2 TABLET ORAL EVERY 8 HOURS
Refills: 0 | Status: DISCONTINUED | OUTPATIENT
Start: 2019-12-31 | End: 2020-01-04

## 2019-12-31 RX ORDER — ERYTHROPOIETIN 10000 [IU]/ML
5000 INJECTION, SOLUTION INTRAVENOUS; SUBCUTANEOUS
Refills: 0 | Status: DISCONTINUED | OUTPATIENT
Start: 2019-12-31 | End: 2020-01-03

## 2019-12-31 RX ORDER — FUROSEMIDE 40 MG
80 TABLET ORAL
Refills: 0 | Status: DISCONTINUED | OUTPATIENT
Start: 2019-12-31 | End: 2020-01-03

## 2019-12-31 RX ADMIN — Medication 650 MILLIGRAM(S): at 17:54

## 2019-12-31 RX ADMIN — Medication 667 MILLIGRAM(S): at 17:53

## 2019-12-31 RX ADMIN — Medication 50 MILLIGRAM(S): at 06:20

## 2019-12-31 RX ADMIN — ERYTHROPOIETIN 5000 UNIT(S): 10000 INJECTION, SOLUTION INTRAVENOUS; SUBCUTANEOUS at 14:51

## 2019-12-31 RX ADMIN — HEPARIN SODIUM 5000 UNIT(S): 5000 INJECTION INTRAVENOUS; SUBCUTANEOUS at 06:20

## 2019-12-31 RX ADMIN — SIMVASTATIN 40 MILLIGRAM(S): 20 TABLET, FILM COATED ORAL at 23:01

## 2019-12-31 RX ADMIN — Medication 50 MILLIGRAM(S): at 17:54

## 2019-12-31 RX ADMIN — Medication 81 MILLIGRAM(S): at 12:18

## 2019-12-31 RX ADMIN — Medication 325 MILLIGRAM(S): at 22:58

## 2019-12-31 RX ADMIN — SEVELAMER CARBONATE 800 MILLIGRAM(S): 2400 POWDER, FOR SUSPENSION ORAL at 12:18

## 2019-12-31 RX ADMIN — CHLORHEXIDINE GLUCONATE 1 APPLICATION(S): 213 SOLUTION TOPICAL at 06:19

## 2019-12-31 RX ADMIN — Medication 80 MILLIGRAM(S): at 17:53

## 2019-12-31 RX ADMIN — INSULIN GLARGINE 30 UNIT(S): 100 INJECTION, SOLUTION SUBCUTANEOUS at 23:57

## 2019-12-31 RX ADMIN — HEPARIN SODIUM 5000 UNIT(S): 5000 INJECTION INTRAVENOUS; SUBCUTANEOUS at 22:59

## 2019-12-31 RX ADMIN — Medication 60 MILLIGRAM(S): at 06:20

## 2019-12-31 RX ADMIN — Medication 30 MILLIGRAM(S): at 06:20

## 2019-12-31 RX ADMIN — CALCITRIOL 0.25 MICROGRAM(S): 0.5 CAPSULE ORAL at 14:41

## 2019-12-31 RX ADMIN — CLOPIDOGREL BISULFATE 75 MILLIGRAM(S): 75 TABLET, FILM COATED ORAL at 12:18

## 2019-12-31 RX ADMIN — Medication 12 UNIT(S): at 12:18

## 2019-12-31 RX ADMIN — SEVELAMER CARBONATE 800 MILLIGRAM(S): 2400 POWDER, FOR SUSPENSION ORAL at 17:51

## 2019-12-31 RX ADMIN — Medication 325 MILLIGRAM(S): at 14:39

## 2019-12-31 RX ADMIN — Medication 2 TABLET(S): at 22:57

## 2019-12-31 RX ADMIN — HEPARIN SODIUM 5000 UNIT(S): 5000 INJECTION INTRAVENOUS; SUBCUTANEOUS at 14:41

## 2019-12-31 RX ADMIN — Medication 667 MILLIGRAM(S): at 14:39

## 2019-12-31 RX ADMIN — PANTOPRAZOLE SODIUM 40 MILLIGRAM(S): 20 TABLET, DELAYED RELEASE ORAL at 06:20

## 2019-12-31 RX ADMIN — Medication 2 TABLET(S): at 14:40

## 2019-12-31 NOTE — PROGRESS NOTE ADULT - SUBJECTIVE AND OBJECTIVE BOX
Hospital Day:  4d    Subjective:    Patient is a 59y old Male who presents with a chief complaint of Dyspnea on exertion (31 Dec 2019 09:44)  This morning patient is sitting up comfortably in bed. He reports he stills has orthopnea and coughing fits when he lays down and sleeps sitting up. Otherwise he denies any new complaints.    Past Medical Hx:   Chronic anemia  [place] (hard of hearing)  OA (osteoarthritis)  Pain in left knee  BPH (benign prostatic hyperplasia)  Carotid artery disease  CAD (coronary artery disease)  HLD (hyperlipidemia)  PAD (peripheral artery disease)  Myocardial infarction  Stented coronary artery  Hypertension  Chronic kidney disease (CKD)  Transient ischemic attack (TIA)  Diabetes mellitus  Stroke  High cholesterol  S/P CABG (coronary artery bypass graft)  CAD (coronary artery disease)  Diabetes    Past Sx:  History of surgery  H/O arterial bypass of lower limb  S/P CABG (coronary artery bypass graft)    Allergies:  No Known Allergies    Current Meds:   Stand Meds:  aspirin enteric coated 81 milliGRAM(s) Oral daily  calcitriol   Capsule 0.25 MICROGram(s) Oral daily  calcium acetate 667 milliGRAM(s) Oral three times a day with meals  calcium carbonate    500 mG (Tums) Chewable 2 Tablet(s) Chew every 8 hours  chlorhexidine 4% Liquid 1 Application(s) Topical <User Schedule>  clopidogrel Tablet 75 milliGRAM(s) Oral daily  dextrose 5%. 1000 milliLiter(s) (50 mL/Hr) IV Continuous <Continuous>  dextrose 50% Injectable 12.5 Gram(s) IV Push once  dextrose 50% Injectable 25 Gram(s) IV Push once  dextrose 50% Injectable 25 Gram(s) IV Push once  epoetin bunny Injectable 5000 Unit(s) SubCutaneous every 7 days  ferrous    sulfate 325 milliGRAM(s) Oral every 8 hours  furosemide   Injectable 80 milliGRAM(s) IV Push two times a day  heparin  Injectable 5000 Unit(s) SubCutaneous every 12 hours  influenza   Vaccine 0.5 milliLiter(s) IntraMuscular once  insulin glargine Injectable (LANTUS) 40 Unit(s) SubCutaneous at bedtime  insulin lispro (HumaLOG) corrective regimen sliding scale   SubCutaneous three times a day before meals  insulin lispro Injectable (HumaLOG) 12 Unit(s) SubCutaneous three times a day before meals  metolazone 2.5 milliGRAM(s) Oral <User Schedule>  metoprolol tartrate 50 milliGRAM(s) Oral two times a day  NIFEdipine XL 30 milliGRAM(s) Oral daily  pantoprazole    Tablet 40 milliGRAM(s) Oral before breakfast  sevelamer carbonate 800 milliGRAM(s) Oral three times a day with meals  simvastatin 40 milliGRAM(s) Oral at bedtime  sodium bicarbonate 650 milliGRAM(s) Oral every 12 hours    PRN Meds:  acetaminophen   Tablet .. 650 milliGRAM(s) Oral every 6 hours PRN Mild Pain (1 - 3)  dextrose 40% Gel 15 Gram(s) Oral once PRN Blood Glucose LESS THAN 70 milliGRAM(s)/deciliter  glucagon  Injectable 1 milliGRAM(s) IntraMuscular once PRN Glucose LESS THAN 70 milligrams/deciliter  melatonin 10 milliGRAM(s) Oral at bedtime PRN Sleep  polyethylene glycol 3350 17 Gram(s) Oral daily PRN Constipation    HOME MEDICATIONS:  aspirin 81 mg oral tablet: 1 tab(s) orally once a day  Bydureon Pen 2 mg subcutaneous injection, extended release: 2 milligram(s) subcutaneous once a week  Levemir 100 units/mL subcutaneous solution: 40 unit(s) subcutaneous once a day (at bedtime). 20 units 10/23/19  Metoprolol Tartrate 50 mg oral tablet: 1 tab(s) orally 2 times a day  Plavix 75 mg oral tablet: 1 tab(s) orally once a day  simvastatin 40 mg oral tablet: 1 tab(s) orally once a day (at bedtime)      Vital Signs:   T(F): 97.1 (12-31-19 @ 06:01), Max: 97.8 (12-30-19 @ 21:36)  HR: 91 (12-31-19 @ 06:01) (76 - 91)  BP: 177/84 (12-31-19 @ 06:01) (141/61 - 177/84)  RR: 18 (12-31-19 @ 06:01) (18 - 18)  SpO2: --      12-30-19 @ 07:01  -  12-31-19 @ 07:00  --------------------------------------------------------  IN: 0 mL / OUT: 200 mL / NET: -200 mL    12-31-19 @ 07:01  -  12-31-19 @ 10:40  --------------------------------------------------------  IN: 0 mL / OUT: 150 mL / NET: -150 mL        Physical Exam:   GENERAL: NAD  HEENT: NCAT  CHEST/LUNG: CTAB  HEART: Regular rate and rhythm; s1 s2 appreciated, No murmurs, rubs, or gallops  ABDOMEN: Soft, Nontender, Nondistended; Bowel sounds present  EXTREMITIES: No LE edema b/l  NERVOUS SYSTEM:  Alert & Oriented X3        Labs:                         8.4    7.97  )-----------( 203      ( 31 Dec 2019 06:45 )             26.9     Neutophil% 68.5, Lymphocyte% 18.2, Monocyte% 7.3, Bands% 0.4 12-31-19 @ 06:45    31 Dec 2019 06:45    140    |  100    |  71     ----------------------------<  78     4.6     |  20     |  6.4      Ca    7.5        31 Dec 2019 06:45  Phos  5.8       31 Dec 2019 06:45  Mg     1.8       30 Dec 2019 05:40    TPro  6.1    /  Alb  3.4    /  TBili  <0.2   /  DBili  x      /  AST  13     /  ALT  9      /  AlkPhos  105    31 Dec 2019 06:45          Hemoglobin A1C, Whole Blood: 6.2 % (12-28-19 @ 05:38)    Serum Pro-Brain Natriuretic Peptide: 89899 pg/mL (12-27-19 @ 09:39)    Troponin 0.27, CKMB --, CK -- 12-28-19 @ 05:38  Troponin 0.25, CKMB 7.1,  12-27-19 @ 17:39    Iron 59, TIBC 225, %Sat 26 Ferritin -- 12-31-19 @ 06:45          Assessment and Plan:     59 year old male with history of chronic kidney disease IV not on hemodialysis, Coronary Artery Disease status post CABG, Diabetes Mellitus II, PVD presenting with shortness of breath for 1 week.     #SOB secondary to fluid overload secondary to CKD 5 and now requiring HD  - Patient not clinically improving on diuretics with uptrending Cr and worsening pleural effusions on Xray  -BNP 12,000 with bilateral effusions on Chest X-Ray and CT  -echo shows EF 51% with grade 2 diastolic dysfunction  -Caution with fluids, 1L fluid restrict  -Cardio recs appreciated  -Nephro following: Had HD 12/29, rest for 12/30, will need to set up for outpatient HD. Recs appreciated: increase lasix to 80 BID  - As per nephro: if no improvement in volume status, will try hd again on thursday/ if unable to use fistula, will need tesio , if volume status improves, will hold on HD till av fistula matures    #CAD status post CABG  -Continue home ASA, Plavix, simvastatin    #PVD  -LLE edema caused by venous insufficiency worse in L than R  -duplex neg     #hyperphosphatemia   - start on binders sevelamir, calcium carbonate, phoslo    #anemia check iron studies   - Iron/TIBC WNL, ferritin pending  - Begin iron sulfate and procrit as per nephro    #elevated troponins likely demand in the setting of CKD5   has history if CABG   on asa and plavix   no further intervention per cardiology   echo showed normal EF     #DM  -Basal/bolus  -A1c 6.2    #HTN  -Continue home metoprolol    #Elevated Alk Phos  -132 on admission, previously normal  -Monitor    #Prolonged QT improved down to 446 from 490s   -Monitor QT  -Avoid QT-prolonging agents    #DVT PPX: subcutaneous heparin  #GI PPX: Protonix  #Diet: DASH, CC, renal, 1L fluid restrict  #Activity: As tolerated  #Dispo: Back to home when medically stable  #CODE: FULL Hospital Day:  4d    Subjective:    Patient is a 59y old Male who presents with a chief complaint of Dyspnea on exertion (31 Dec 2019 09:44)  This morning patient is sitting up comfortably in bed. He reports he stills has orthopnea and coughing fits when he lays down and sleeps sitting up. Otherwise he denies any new complaints.    Past Medical Hx:   Chronic anemia  [place] (hard of hearing)  OA (osteoarthritis)  Pain in left knee  BPH (benign prostatic hyperplasia)  Carotid artery disease  CAD (coronary artery disease)  HLD (hyperlipidemia)  PAD (peripheral artery disease)  Myocardial infarction  Stented coronary artery  Hypertension  Chronic kidney disease (CKD)  Transient ischemic attack (TIA)  Diabetes mellitus  Stroke  High cholesterol  S/P CABG (coronary artery bypass graft)  CAD (coronary artery disease)  Diabetes    Past Sx:  History of surgery  H/O arterial bypass of lower limb  S/P CABG (coronary artery bypass graft)    Allergies:  No Known Allergies    Current Meds:   Stand Meds:  aspirin enteric coated 81 milliGRAM(s) Oral daily  calcitriol   Capsule 0.25 MICROGram(s) Oral daily  calcium acetate 667 milliGRAM(s) Oral three times a day with meals  calcium carbonate    500 mG (Tums) Chewable 2 Tablet(s) Chew every 8 hours  chlorhexidine 4% Liquid 1 Application(s) Topical <User Schedule>  clopidogrel Tablet 75 milliGRAM(s) Oral daily  dextrose 5%. 1000 milliLiter(s) (50 mL/Hr) IV Continuous <Continuous>  dextrose 50% Injectable 12.5 Gram(s) IV Push once  dextrose 50% Injectable 25 Gram(s) IV Push once  dextrose 50% Injectable 25 Gram(s) IV Push once  epoetin bunny Injectable 5000 Unit(s) SubCutaneous every 7 days  ferrous    sulfate 325 milliGRAM(s) Oral every 8 hours  furosemide   Injectable 80 milliGRAM(s) IV Push two times a day  heparin  Injectable 5000 Unit(s) SubCutaneous every 12 hours  influenza   Vaccine 0.5 milliLiter(s) IntraMuscular once  insulin glargine Injectable (LANTUS) 40 Unit(s) SubCutaneous at bedtime  insulin lispro (HumaLOG) corrective regimen sliding scale   SubCutaneous three times a day before meals  insulin lispro Injectable (HumaLOG) 12 Unit(s) SubCutaneous three times a day before meals  metolazone 2.5 milliGRAM(s) Oral <User Schedule>  metoprolol tartrate 50 milliGRAM(s) Oral two times a day  NIFEdipine XL 30 milliGRAM(s) Oral daily  pantoprazole    Tablet 40 milliGRAM(s) Oral before breakfast  sevelamer carbonate 800 milliGRAM(s) Oral three times a day with meals  simvastatin 40 milliGRAM(s) Oral at bedtime  sodium bicarbonate 650 milliGRAM(s) Oral every 12 hours    PRN Meds:  acetaminophen   Tablet .. 650 milliGRAM(s) Oral every 6 hours PRN Mild Pain (1 - 3)  dextrose 40% Gel 15 Gram(s) Oral once PRN Blood Glucose LESS THAN 70 milliGRAM(s)/deciliter  glucagon  Injectable 1 milliGRAM(s) IntraMuscular once PRN Glucose LESS THAN 70 milligrams/deciliter  melatonin 10 milliGRAM(s) Oral at bedtime PRN Sleep  polyethylene glycol 3350 17 Gram(s) Oral daily PRN Constipation    HOME MEDICATIONS:  aspirin 81 mg oral tablet: 1 tab(s) orally once a day  Bydureon Pen 2 mg subcutaneous injection, extended release: 2 milligram(s) subcutaneous once a week  Levemir 100 units/mL subcutaneous solution: 40 unit(s) subcutaneous once a day (at bedtime). 20 units 10/23/19  Metoprolol Tartrate 50 mg oral tablet: 1 tab(s) orally 2 times a day  Plavix 75 mg oral tablet: 1 tab(s) orally once a day  simvastatin 40 mg oral tablet: 1 tab(s) orally once a day (at bedtime)      Vital Signs:   T(F): 97.1 (12-31-19 @ 06:01), Max: 97.8 (12-30-19 @ 21:36)  HR: 91 (12-31-19 @ 06:01) (76 - 91)  BP: 177/84 (12-31-19 @ 06:01) (141/61 - 177/84)  RR: 18 (12-31-19 @ 06:01) (18 - 18)  SpO2: --      12-30-19 @ 07:01  -  12-31-19 @ 07:00  --------------------------------------------------------  IN: 0 mL / OUT: 200 mL / NET: -200 mL    12-31-19 @ 07:01  -  12-31-19 @ 10:40  --------------------------------------------------------  IN: 0 mL / OUT: 150 mL / NET: -150 mL        Physical Exam:   GENERAL: NAD  HEENT: NCAT  CHEST/LUNG: Mild b/l basilar crackles  HEART: Regular rate and rhythm; s1 s2 appreciated, No murmurs, rubs, or gallops  ABDOMEN: Soft, Nontender, Nondistended; Bowel sounds present  EXTREMITIES: 3+ LE edema b/l  NERVOUS SYSTEM:  Alert & Oriented X3        Labs:                         8.4    7.97  )-----------( 203      ( 31 Dec 2019 06:45 )             26.9     Neutophil% 68.5, Lymphocyte% 18.2, Monocyte% 7.3, Bands% 0.4 12-31-19 @ 06:45    31 Dec 2019 06:45    140    |  100    |  71     ----------------------------<  78     4.6     |  20     |  6.4      Ca    7.5        31 Dec 2019 06:45  Phos  5.8       31 Dec 2019 06:45  Mg     1.8       30 Dec 2019 05:40    TPro  6.1    /  Alb  3.4    /  TBili  <0.2   /  DBili  x      /  AST  13     /  ALT  9      /  AlkPhos  105    31 Dec 2019 06:45          Hemoglobin A1C, Whole Blood: 6.2 % (12-28-19 @ 05:38)    Serum Pro-Brain Natriuretic Peptide: 84024 pg/mL (12-27-19 @ 09:39)    Troponin 0.27, CKMB --, CK -- 12-28-19 @ 05:38  Troponin 0.25, CKMB 7.1,  12-27-19 @ 17:39    Iron 59, TIBC 225, %Sat 26 Ferritin -- 12-31-19 @ 06:45          Assessment and Plan:     59 year old male with history of chronic kidney disease IV not on hemodialysis, Coronary Artery Disease status post CABG, Diabetes Mellitus II, PVD presenting with shortness of breath for 1 week.     #SOB secondary to fluid overload secondary to CKD 5 and now requiring HD  - Patient not clinically improving on diuretics with uptrending Cr and worsening pleural effusions on Xray  -BNP 12,000 with bilateral effusions on Chest X-Ray and CT  -echo shows EF 51% with grade 2 diastolic dysfunction  -Caution with fluids, 1L fluid restrict  -Cardio recs appreciated  -Nephro following: Had HD 12/29, rest for 12/30, will need to set up for outpatient HD. Recs appreciated: increase lasix to 80 BID  - As per nephro: if no improvement in volume status, will try hd again on thursday/ if unable to use fistula, will need tesio , if volume status improves, will hold on HD till av fistula matures    #CAD status post CABG  -Continue home ASA, Plavix, simvastatin    #PVD  -LLE edema caused by venous insufficiency worse in L than R  -duplex neg     #hyperphosphatemia   - start on binders sevelamir, calcium carbonate, phoslo    #anemia check iron studies   - Iron/TIBC WNL, ferritin pending  - Begin iron sulfate and procrit as per nephro    #elevated troponins likely demand in the setting of CKD5   has history if CABG   on asa and plavix   no further intervention per cardiology   echo showed normal EF     #DM  -Basal/bolus  -A1c 6.2    #HTN  -Continue home metoprolol    #Elevated Alk Phos  -132 on admission, previously normal  -Monitor    #Prolonged QT improved down to 446 from 490s   -Monitor QT  -Avoid QT-prolonging agents    #DVT PPX: subcutaneous heparin  #GI PPX: Protonix  #Diet: DASH, CC, renal, 1L fluid restrict  #Activity: As tolerated  #Dispo: Back to home when medically stable  #CODE: FULL

## 2019-12-31 NOTE — PROGRESS NOTE ADULT - ASSESSMENT
Pt with CKD stage 5, not on HD yet, AVF in place in anticipation of HD, DM, HTN, CAD, CHF presents with worsening SOB for 1 week. Admits to have ran out of lasix. No CP.  CTA neg for PE, b/l pleural effusions.  CKD 5 - with fluid overload:  # unable to use av fistula was infiltrated   # no need for HD today  # increase lasix to 80 iv q 12   # add metolazone 2.5 q 48  # if no improvement in volume status, will try hd again on thursday/ if unable to use fistula, will need tesio , if volume status improves, will hold on HD till av fistula matures   # start sodium bicarbonate 650 q 12  # ph noted, on renagel, add phoslo , increase calcium carbonate to 2 tablets po q 8 ( not with meals), pth noted, start calcitriol 0.25 q 24  #BP not well controlled, volume related, diuretics adjusted   #h/h noted, start feso4 po q 8, and procrit 5000 units s/c weekly   # will follow

## 2019-12-31 NOTE — PROGRESS NOTE ADULT - ASSESSMENT
fluid overload secondary to CKD 5 not on HD yet: patient did not get HD because AVF was infiltrated :   per renal increase IV lasix to 80 BID   metolazone 2,5 q48   and if does not respond then renal will try HD on thursday through fistula if unsuccessful then will need tesio.   hyperphosphatemia on binders renagel and phoslow   for acidosis on bicarbonate     Anemia stable on iron and epo weekly started     hypocalcemia on calcitriol and calcium     HTN: was high this am recheck. c/w current meds     DM: 82 this am on lantus 40 qhs will decrease to 30 since scr worse to prevent hypoglycemia      HLD on statin     CAD and PVD: c/w asa and plavix. history of CABG     DVT PPX hep subc

## 2019-12-31 NOTE — PROGRESS NOTE ADULT - SUBJECTIVE AND OBJECTIVE BOX
no chest pain   no sob   still with LE edema   +orthopnea     Vital Signs Last 24 Hrs  T(C): 36.2 (31 Dec 2019 06:01), Max: 36.6 (30 Dec 2019 21:36)  T(F): 97.1 (31 Dec 2019 06:01), Max: 97.8 (30 Dec 2019 21:36)  HR: 91 (31 Dec 2019 06:01) (76 - 91)  BP: 177/84 (31 Dec 2019 06:01) (141/61 - 177/84)  BP(mean): --  RR: 18 (31 Dec 2019 06:01) (18 - 18)  SpO2: --    PHYSICAL EXAM:  GENERAL: NAD,   Pulm: B/L basal crackles   CV: Regular rate and rhythm; No murmurs, rubs, or gallops  GI: Soft, Nontender, Nondistended; Bowel sounds present  EXTREMITIES:  +edema   PSYCH: AAOx3  NEUROLOGY: non-focal  SKIN: No rashes or lesions                          8.4    7.97  )-----------( 203      ( 31 Dec 2019 06:45 )             26.9     12-31    140  |  100  |  71<HH>  ----------------------------<  78  4.6   |  20  |  6.4<HH>    Ca    7.5<L>      31 Dec 2019 06:45  Phos  5.8     12-31  Mg     1.8     12-30    TPro  6.1  /  Alb  3.4<L>  /  TBili  <0.2  /  DBili  x   /  AST  13  /  ALT  9   /  AlkPhos  105  12-31    LIVER FUNCTIONS - ( 31 Dec 2019 06:45 )  Alb: 3.4 g/dL / Pro: 6.1 g/dL / ALK PHOS: 105 U/L / ALT: 9 U/L / AST: 13 U/L / GGT: x                 MEDICATIONS  (STANDING):  aspirin enteric coated 81 milliGRAM(s) Oral daily  calcitriol   Capsule 0.25 MICROGram(s) Oral daily  calcium acetate 667 milliGRAM(s) Oral three times a day with meals  calcium carbonate    500 mG (Tums) Chewable 2 Tablet(s) Chew every 8 hours  chlorhexidine 4% Liquid 1 Application(s) Topical <User Schedule>  clopidogrel Tablet 75 milliGRAM(s) Oral daily  dextrose 5%. 1000 milliLiter(s) (50 mL/Hr) IV Continuous <Continuous>  dextrose 50% Injectable 12.5 Gram(s) IV Push once  dextrose 50% Injectable 25 Gram(s) IV Push once  dextrose 50% Injectable 25 Gram(s) IV Push once  epoetin bunny Injectable 5000 Unit(s) SubCutaneous every 7 days  ferrous    sulfate 325 milliGRAM(s) Oral every 8 hours  furosemide   Injectable 80 milliGRAM(s) IV Push two times a day  heparin  Injectable 5000 Unit(s) SubCutaneous every 12 hours  influenza   Vaccine 0.5 milliLiter(s) IntraMuscular once  insulin glargine Injectable (LANTUS) 40 Unit(s) SubCutaneous at bedtime  insulin lispro (HumaLOG) corrective regimen sliding scale   SubCutaneous three times a day before meals  insulin lispro Injectable (HumaLOG) 12 Unit(s) SubCutaneous three times a day before meals  metolazone 2.5 milliGRAM(s) Oral <User Schedule>  metoprolol tartrate 50 milliGRAM(s) Oral two times a day  NIFEdipine XL 30 milliGRAM(s) Oral daily  pantoprazole    Tablet 40 milliGRAM(s) Oral before breakfast  sevelamer carbonate 800 milliGRAM(s) Oral three times a day with meals  simvastatin 40 milliGRAM(s) Oral at bedtime  sodium bicarbonate 650 milliGRAM(s) Oral every 12 hours    MEDICATIONS  (PRN):  acetaminophen   Tablet .. 650 milliGRAM(s) Oral every 6 hours PRN Mild Pain (1 - 3)  dextrose 40% Gel 15 Gram(s) Oral once PRN Blood Glucose LESS THAN 70 milliGRAM(s)/deciliter  glucagon  Injectable 1 milliGRAM(s) IntraMuscular once PRN Glucose LESS THAN 70 milligrams/deciliter  melatonin 10 milliGRAM(s) Oral at bedtime PRN Sleep  polyethylene glycol 3350 17 Gram(s) Oral daily PRN Constipation

## 2019-12-31 NOTE — PROGRESS NOTE ADULT - SUBJECTIVE AND OBJECTIVE BOX
seen and examined  no distress  no new complaints         PAST HISTORY  --------------------------------------------------------------------------------  No significant changes to PMH, PSH, FHx, SHx, unless otherwise noted    ALLERGIES & MEDICATIONS  --------------------------------------------------------------------------------  Allergies    No Known Allergies        Standing Inpatient Medications  aspirin enteric coated 81 milliGRAM(s) Oral daily  chlorhexidine 4% Liquid 1 Application(s) Topical <User Schedule>  clopidogrel Tablet 75 milliGRAM(s) Oral daily  dextrose 5%. 1000 milliLiter(s) IV Continuous <Continuous>  dextrose 50% Injectable 12.5 Gram(s) IV Push once  dextrose 50% Injectable 25 Gram(s) IV Push once  dextrose 50% Injectable 25 Gram(s) IV Push once  furosemide   Injectable 60 milliGRAM(s) IV Push two times a day  heparin  Injectable 5000 Unit(s) SubCutaneous every 12 hours  influenza   Vaccine 0.5 milliLiter(s) IntraMuscular once  insulin glargine Injectable (LANTUS) 40 Unit(s) SubCutaneous at bedtime  insulin lispro (HumaLOG) corrective regimen sliding scale   SubCutaneous three times a day before meals  insulin lispro Injectable (HumaLOG) 12 Unit(s) SubCutaneous three times a day before meals  metoprolol tartrate 50 milliGRAM(s) Oral two times a day  NIFEdipine XL 30 milliGRAM(s) Oral daily  pantoprazole    Tablet 40 milliGRAM(s) Oral before breakfast  sevelamer carbonate 800 milliGRAM(s) Oral three times a day with meals  simvastatin 40 milliGRAM(s) Oral at bedtime    PRN Inpatient Medications  acetaminophen   Tablet .. 650 milliGRAM(s) Oral every 6 hours PRN  dextrose 40% Gel 15 Gram(s) Oral once PRN  glucagon  Injectable 1 milliGRAM(s) IntraMuscular once PRN  melatonin 10 milliGRAM(s) Oral at bedtime PRN  polyethylene glycol 3350 17 Gram(s) Oral daily PRN        VITALS/PHYSICAL EXAM  --------------------------------------------------------------------------------  T(C): 36.2 (12-31-19 @ 06:01), Max: 36.6 (12-30-19 @ 21:36)  HR: 91 (12-31-19 @ 06:01) (76 - 91)  BP: 177/84 (12-31-19 @ 06:01) (141/61 - 177/84)  RR: 18 (12-31-19 @ 06:01) (18 - 18)  SpO2: --  Wt(kg): --        12-30-19 @ 07:01  -  12-31-19 @ 07:00  --------------------------------------------------------  IN: 0 mL / OUT: 200 mL / NET: -200 mL    12-31-19 @ 07:01  -  12-31-19 @ 09:45  --------------------------------------------------------  IN: 0 mL / OUT: 150 mL / NET: -150 mL      Physical Exam:  	Gen: NAD  	Pulm: decrease BS  B/L  	CV:S1S2; no rub  	Abd: +distended  	LE: edema    LABS/STUDIES  --------------------------------------------------------------------------------              8.4    7.97  >-----------<  203      [12-31-19 @ 06:45]              26.9     140  |  100  |  71  ----------------------------<  78      [12-31-19 @ 06:45]  4.6   |  20  |  6.4        Ca     7.5     [12-31-19 @ 06:45]      Mg     1.8     [12-30-19 @ 05:40]      Phos  5.8     [12-31-19 @ 06:45]    TPro  6.1  /  Alb  3.4  /  TBili  <0.2  /  DBili  x   /  AST  13  /  ALT  9   /  AlkPhos  105  [12-31-19 @ 06:45]        Creatinine Trend:  SCr 6.4 [12-31 @ 06:45]  SCr 5.6 [12-30 @ 05:40]  SCr 5.9 [12-29 @ 06:25]  SCr 5.2 [12-28 @ 05:38]  SCr 4.9 [12-27 @ 09:39]        Iron 59, TIBC 225, %sat 26      [12-31-19 @ 06:45]  PTH -- (Ca 7.4)      [12-29-19 @ 17:44]   224  HbA1c 6.2      [12-28-19 @ 05:38]  TSH 1.49      [12-28-19 @ 05:38]    HBsAb <3.0      [12-29-19 @ 17:44]  HBsAb Nonreact      [12-29-19 @ 17:44]  HBsAg Nonreact      [12-29-19 @ 17:44]  HBcAb Nonreact      [12-29-19 @ 17:44]  HCV 0.10, Nonreact      [12-29-19 @ 17:44]

## 2020-01-01 LAB
ALBUMIN SERPL ELPH-MCNC: 3.6 G/DL — SIGNIFICANT CHANGE UP (ref 3.5–5.2)
ALP SERPL-CCNC: 104 U/L — SIGNIFICANT CHANGE UP (ref 30–115)
ALT FLD-CCNC: 9 U/L — SIGNIFICANT CHANGE UP (ref 0–41)
ANION GAP SERPL CALC-SCNC: 18 MMOL/L — HIGH (ref 7–14)
AST SERPL-CCNC: 15 U/L — SIGNIFICANT CHANGE UP (ref 0–41)
BASOPHILS # BLD AUTO: 0.1 K/UL — SIGNIFICANT CHANGE UP (ref 0–0.2)
BASOPHILS NFR BLD AUTO: 1.2 % — HIGH (ref 0–1)
BILIRUB SERPL-MCNC: <0.2 MG/DL — SIGNIFICANT CHANGE UP (ref 0.2–1.2)
BUN SERPL-MCNC: 74 MG/DL — CRITICAL HIGH (ref 10–20)
CALCIUM SERPL-MCNC: 7.7 MG/DL — LOW (ref 8.5–10.1)
CHLORIDE SERPL-SCNC: 101 MMOL/L — SIGNIFICANT CHANGE UP (ref 98–110)
CO2 SERPL-SCNC: 19 MMOL/L — SIGNIFICANT CHANGE UP (ref 17–32)
CREAT SERPL-MCNC: 5.9 MG/DL — CRITICAL HIGH (ref 0.7–1.5)
EOSINOPHIL # BLD AUTO: 0.32 K/UL — SIGNIFICANT CHANGE UP (ref 0–0.7)
EOSINOPHIL NFR BLD AUTO: 3.9 % — SIGNIFICANT CHANGE UP (ref 0–8)
GLUCOSE BLDC GLUCOMTR-MCNC: 117 MG/DL — HIGH (ref 70–99)
GLUCOSE BLDC GLUCOMTR-MCNC: 119 MG/DL — HIGH (ref 70–99)
GLUCOSE BLDC GLUCOMTR-MCNC: 127 MG/DL — HIGH (ref 70–99)
GLUCOSE BLDC GLUCOMTR-MCNC: 136 MG/DL — HIGH (ref 70–99)
GLUCOSE BLDC GLUCOMTR-MCNC: 141 MG/DL — HIGH (ref 70–99)
GLUCOSE BLDC GLUCOMTR-MCNC: 78 MG/DL — SIGNIFICANT CHANGE UP (ref 70–99)
GLUCOSE SERPL-MCNC: 81 MG/DL — SIGNIFICANT CHANGE UP (ref 70–99)
HCT VFR BLD CALC: 27.6 % — LOW (ref 42–52)
HGB BLD-MCNC: 8.8 G/DL — LOW (ref 14–18)
IMM GRANULOCYTES NFR BLD AUTO: 0.2 % — SIGNIFICANT CHANGE UP (ref 0.1–0.3)
LYMPHOCYTES # BLD AUTO: 1.37 K/UL — SIGNIFICANT CHANGE UP (ref 1.2–3.4)
LYMPHOCYTES # BLD AUTO: 16.6 % — LOW (ref 20.5–51.1)
MCHC RBC-ENTMCNC: 29.7 PG — SIGNIFICANT CHANGE UP (ref 27–31)
MCHC RBC-ENTMCNC: 31.9 G/DL — LOW (ref 32–37)
MCV RBC AUTO: 93.2 FL — SIGNIFICANT CHANGE UP (ref 80–94)
MONOCYTES # BLD AUTO: 0.73 K/UL — HIGH (ref 0.1–0.6)
MONOCYTES NFR BLD AUTO: 8.9 % — SIGNIFICANT CHANGE UP (ref 1.7–9.3)
NEUTROPHILS # BLD AUTO: 5.7 K/UL — SIGNIFICANT CHANGE UP (ref 1.4–6.5)
NEUTROPHILS NFR BLD AUTO: 69.2 % — SIGNIFICANT CHANGE UP (ref 42.2–75.2)
NRBC # BLD: 0 /100 WBCS — SIGNIFICANT CHANGE UP (ref 0–0)
PHOSPHATE SERPL-MCNC: 5.5 MG/DL — HIGH (ref 2.1–4.9)
PLATELET # BLD AUTO: 219 K/UL — SIGNIFICANT CHANGE UP (ref 130–400)
POTASSIUM SERPL-MCNC: 4.6 MMOL/L — SIGNIFICANT CHANGE UP (ref 3.5–5)
POTASSIUM SERPL-SCNC: 4.6 MMOL/L — SIGNIFICANT CHANGE UP (ref 3.5–5)
PROT SERPL-MCNC: 6.4 G/DL — SIGNIFICANT CHANGE UP (ref 6–8)
RBC # BLD: 2.96 M/UL — LOW (ref 4.7–6.1)
RBC # FLD: 12.4 % — SIGNIFICANT CHANGE UP (ref 11.5–14.5)
SODIUM SERPL-SCNC: 138 MMOL/L — SIGNIFICANT CHANGE UP (ref 135–146)
WBC # BLD: 8.24 K/UL — SIGNIFICANT CHANGE UP (ref 4.8–10.8)
WBC # FLD AUTO: 8.24 K/UL — SIGNIFICANT CHANGE UP (ref 4.8–10.8)

## 2020-01-01 PROCEDURE — 99233 SBSQ HOSP IP/OBS HIGH 50: CPT

## 2020-01-01 RX ADMIN — CALCITRIOL 0.25 MICROGRAM(S): 0.5 CAPSULE ORAL at 12:21

## 2020-01-01 RX ADMIN — Medication 325 MILLIGRAM(S): at 05:43

## 2020-01-01 RX ADMIN — PANTOPRAZOLE SODIUM 40 MILLIGRAM(S): 20 TABLET, DELAYED RELEASE ORAL at 05:43

## 2020-01-01 RX ADMIN — SEVELAMER CARBONATE 800 MILLIGRAM(S): 2400 POWDER, FOR SUSPENSION ORAL at 12:20

## 2020-01-01 RX ADMIN — Medication 650 MILLIGRAM(S): at 17:50

## 2020-01-01 RX ADMIN — Medication 325 MILLIGRAM(S): at 13:34

## 2020-01-01 RX ADMIN — Medication 30 MILLIGRAM(S): at 05:43

## 2020-01-01 RX ADMIN — Medication 667 MILLIGRAM(S): at 09:59

## 2020-01-01 RX ADMIN — INSULIN GLARGINE 30 UNIT(S): 100 INJECTION, SOLUTION SUBCUTANEOUS at 22:07

## 2020-01-01 RX ADMIN — SIMVASTATIN 40 MILLIGRAM(S): 20 TABLET, FILM COATED ORAL at 21:49

## 2020-01-01 RX ADMIN — Medication 2 TABLET(S): at 05:42

## 2020-01-01 RX ADMIN — Medication 80 MILLIGRAM(S): at 17:49

## 2020-01-01 RX ADMIN — HEPARIN SODIUM 5000 UNIT(S): 5000 INJECTION INTRAVENOUS; SUBCUTANEOUS at 05:44

## 2020-01-01 RX ADMIN — Medication 650 MILLIGRAM(S): at 05:43

## 2020-01-01 RX ADMIN — CLOPIDOGREL BISULFATE 75 MILLIGRAM(S): 75 TABLET, FILM COATED ORAL at 12:20

## 2020-01-01 RX ADMIN — SEVELAMER CARBONATE 800 MILLIGRAM(S): 2400 POWDER, FOR SUSPENSION ORAL at 10:03

## 2020-01-01 RX ADMIN — Medication 50 MILLIGRAM(S): at 05:42

## 2020-01-01 RX ADMIN — SEVELAMER CARBONATE 800 MILLIGRAM(S): 2400 POWDER, FOR SUSPENSION ORAL at 17:49

## 2020-01-01 RX ADMIN — Medication 325 MILLIGRAM(S): at 21:49

## 2020-01-01 RX ADMIN — Medication 50 MILLIGRAM(S): at 17:49

## 2020-01-01 RX ADMIN — Medication 667 MILLIGRAM(S): at 17:49

## 2020-01-01 RX ADMIN — Medication 2 TABLET(S): at 13:33

## 2020-01-01 RX ADMIN — Medication 80 MILLIGRAM(S): at 05:43

## 2020-01-01 RX ADMIN — Medication 81 MILLIGRAM(S): at 12:21

## 2020-01-01 RX ADMIN — HEPARIN SODIUM 5000 UNIT(S): 5000 INJECTION INTRAVENOUS; SUBCUTANEOUS at 21:49

## 2020-01-01 RX ADMIN — Medication 667 MILLIGRAM(S): at 12:21

## 2020-01-01 NOTE — PROGRESS NOTE ADULT - ASSESSMENT
CKD 5 with CHF , fistula with good bruit   fluid overload continue diuretics   fistula  not mature yet  will require tesio for dialysis tomorrow

## 2020-01-01 NOTE — PROGRESS NOTE ADULT - SUBJECTIVE AND OBJECTIVE BOX
no chest pain   no sob   still with le edema     Vital Signs Last 24 Hrs  T(C): 36.7 (01 Jan 2020 05:30), Max: 36.7 (01 Jan 2020 05:30)  T(F): 98 (01 Jan 2020 05:30), Max: 98 (01 Jan 2020 05:30)  HR: 81 (01 Jan 2020 05:30) (77 - 81)  BP: 165/81 (01 Jan 2020 05:30) (141/65 - 165/81)  BP(mean): --  RR: 18 (01 Jan 2020 05:30) (18 - 18)  SpO2: --    PHYSICAL EXAM:  GENERAL: NAD,   Pulm: decreased air entry at bases   CV: Regular rate and rhythm; No murmurs, rubs, or gallops  GI: Soft, Nontender, Nondistended; Bowel sounds present  EXTREMITIES: ++edema  PSYCH: AAOx3  NEUROLOGY: non-focal                            8.8    8.24  )-----------( 219      ( 01 Jan 2020 05:50 )             27.6     01-01    138  |  101  |  74<HH>  ----------------------------<  81  4.6   |  19  |  5.9<HH>    Ca    7.7<L>      01 Jan 2020 05:50  Phos  5.5     01-01    TPro  6.4  /  Alb  3.6  /  TBili  <0.2  /  DBili  x   /  AST  15  /  ALT  9   /  AlkPhos  104  01-01    LIVER FUNCTIONS - ( 01 Jan 2020 05:50 )  Alb: 3.6 g/dL / Pro: 6.4 g/dL / ALK PHOS: 104 U/L / ALT: 9 U/L / AST: 15 U/L / GGT: x                 MEDICATIONS  (STANDING):  aspirin enteric coated 81 milliGRAM(s) Oral daily  calcitriol   Capsule 0.25 MICROGram(s) Oral daily  calcium acetate 667 milliGRAM(s) Oral three times a day with meals  calcium carbonate    500 mG (Tums) Chewable 2 Tablet(s) Chew every 8 hours  chlorhexidine 4% Liquid 1 Application(s) Topical <User Schedule>  clopidogrel Tablet 75 milliGRAM(s) Oral daily  dextrose 5%. 1000 milliLiter(s) (50 mL/Hr) IV Continuous <Continuous>  dextrose 50% Injectable 12.5 Gram(s) IV Push once  dextrose 50% Injectable 25 Gram(s) IV Push once  dextrose 50% Injectable 25 Gram(s) IV Push once  epoetin bunny Injectable 5000 Unit(s) SubCutaneous every 7 days  ferrous    sulfate 325 milliGRAM(s) Oral every 8 hours  furosemide   Injectable 80 milliGRAM(s) IV Push two times a day  heparin  Injectable 5000 Unit(s) SubCutaneous every 8 hours  influenza   Vaccine 0.5 milliLiter(s) IntraMuscular once  insulin glargine Injectable (LANTUS) 30 Unit(s) SubCutaneous at bedtime  insulin lispro (HumaLOG) corrective regimen sliding scale   SubCutaneous three times a day before meals  insulin lispro Injectable (HumaLOG) 12 Unit(s) SubCutaneous three times a day before meals  metolazone 2.5 milliGRAM(s) Oral <User Schedule>  metoprolol tartrate 50 milliGRAM(s) Oral two times a day  NIFEdipine XL 30 milliGRAM(s) Oral daily  pantoprazole    Tablet 40 milliGRAM(s) Oral before breakfast  sevelamer carbonate 800 milliGRAM(s) Oral three times a day with meals  simvastatin 40 milliGRAM(s) Oral at bedtime  sodium bicarbonate 650 milliGRAM(s) Oral every 12 hours    MEDICATIONS  (PRN):  acetaminophen   Tablet .. 650 milliGRAM(s) Oral every 6 hours PRN Mild Pain (1 - 3)  dextrose 40% Gel 15 Gram(s) Oral once PRN Blood Glucose LESS THAN 70 milliGRAM(s)/deciliter  glucagon  Injectable 1 milliGRAM(s) IntraMuscular once PRN Glucose LESS THAN 70 milligrams/deciliter  melatonin 10 milliGRAM(s) Oral at bedtime PRN Sleep  polyethylene glycol 3350 17 Gram(s) Oral daily PRN Constipation

## 2020-01-01 NOTE — PROGRESS NOTE ADULT - SUBJECTIVE AND OBJECTIVE BOX
[facility] FOLLOW UP NOTE  --------------------------------------------------------------------------------  Chief Complaint:    24 hour events/subjective:        PAST HISTORY  --------------------------------------------------------------------------------  No significant changes to PMH, PSH, FHx, SHx, unless otherwise noted    ALLERGIES & MEDICATIONS  --------------------------------------------------------------------------------  Allergies    No Known Allergies    Intolerances      Standing Inpatient Medications  aspirin enteric coated 81 milliGRAM(s) Oral daily  calcitriol   Capsule 0.25 MICROGram(s) Oral daily  calcium acetate 667 milliGRAM(s) Oral three times a day with meals  calcium carbonate    500 mG (Tums) Chewable 2 Tablet(s) Chew every 8 hours  chlorhexidine 4% Liquid 1 Application(s) Topical <User Schedule>  clopidogrel Tablet 75 milliGRAM(s) Oral daily  dextrose 5%. 1000 milliLiter(s) IV Continuous <Continuous>  dextrose 50% Injectable 12.5 Gram(s) IV Push once  dextrose 50% Injectable 25 Gram(s) IV Push once  dextrose 50% Injectable 25 Gram(s) IV Push once  epoetin bunny Injectable 5000 Unit(s) SubCutaneous every 7 days  ferrous    sulfate 325 milliGRAM(s) Oral every 8 hours  furosemide   Injectable 80 milliGRAM(s) IV Push two times a day  heparin  Injectable 5000 Unit(s) SubCutaneous every 8 hours  influenza   Vaccine 0.5 milliLiter(s) IntraMuscular once  insulin glargine Injectable (LANTUS) 30 Unit(s) SubCutaneous at bedtime  insulin lispro (HumaLOG) corrective regimen sliding scale   SubCutaneous three times a day before meals  insulin lispro Injectable (HumaLOG) 12 Unit(s) SubCutaneous three times a day before meals  metolazone 2.5 milliGRAM(s) Oral <User Schedule>  metoprolol tartrate 50 milliGRAM(s) Oral two times a day  NIFEdipine XL 30 milliGRAM(s) Oral daily  pantoprazole    Tablet 40 milliGRAM(s) Oral before breakfast  sevelamer carbonate 800 milliGRAM(s) Oral three times a day with meals  simvastatin 40 milliGRAM(s) Oral at bedtime  sodium bicarbonate 650 milliGRAM(s) Oral every 12 hours    PRN Inpatient Medications  acetaminophen   Tablet .. 650 milliGRAM(s) Oral every 6 hours PRN  dextrose 40% Gel 15 Gram(s) Oral once PRN  glucagon  Injectable 1 milliGRAM(s) IntraMuscular once PRN  melatonin 10 milliGRAM(s) Oral at bedtime PRN  polyethylene glycol 3350 17 Gram(s) Oral daily PRN      REVIEW OF SYSTEMS  --------------------------------------------------------------------------------  Gen: No weight changes, fatigue, fevers/chills, weakness  Skin: No rashes  Head/Eyes/Ears/Mouth: No headache; Normal hearing; Normal vision w/o blurriness; No sinus pain/discomfort, sore throat  Respiratory: No dyspnea, cough, wheezing, hemoptysis  CV: No chest pain, PND, orthopnea  GI: No abdominal pain, diarrhea, constipation, nausea, vomiting, melena, hematochezia  : No increased frequency, dysuria, hematuria, nocturia  MSK: No joint pain/swelling; no back pain; no edema  Neuro: No dizziness/lightheadedness, weakness, seizures, numbness, tingling  Heme: No easy bruising or bleeding  Endo: No heat/cold intolerance  Psych: No significant nervousness, anxiety, stress, depression    All other systems were reviewed and are negative, except as noted.    VITALS/PHYSICAL EXAM  --------------------------------------------------------------------------------  T(C): 36.7 (01-01-20 @ 05:30), Max: 36.7 (01-01-20 @ 05:30)  HR: 81 (01-01-20 @ 05:30) (77 - 81)  BP: 165/81 (01-01-20 @ 05:30) (141/65 - 165/81)  RR: 18 (01-01-20 @ 05:30) (18 - 18)  SpO2: 98% (01-01-20 @ 09:34) (98% - 98%)  Wt(kg): --        12-31-19 @ 07:01  -  01-01-20 @ 07:00  --------------------------------------------------------  IN: 708 mL / OUT: 2425 mL / NET: -1717 mL    01-01-20 @ 07:01  -  01-01-20 @ 11:11  --------------------------------------------------------  IN: 0 mL / OUT: 500 mL / NET: -500 mL      Physical Exam:  	Gen: NAD, well-appearing  	HEENT: PERRL, supple neck, clear oropharynx  	Pulm: CTA B/L  	CV: RRR, S1S2; no rub  	Back: No spinal or CVA tenderness; no sacral edema  	Abd: +BS, soft, nontender/nondistended  	: No suprapubic tenderness  	UE: Warm, FROM, no clubbing, intact strength; no edema; no asterixis  	LE: Warm, FROM, no clubbing, intact strength; no edema  	Neuro: No focal deficits, intact gait  	Psych: Normal affect and mood  	Skin: Warm, without rashes  	Vascular access:    LABS/STUDIES  --------------------------------------------------------------------------------              8.8    8.24  >-----------<  219      [01-01-20 @ 05:50]              27.6     138  |  101  |  74  ----------------------------<  81      [01-01-20 @ 05:50]  4.6   |  19  |  5.9        Ca     7.7     [01-01-20 @ 05:50]      Phos  5.5     [01-01-20 @ 05:50]    TPro  6.4  /  Alb  3.6  /  TBili  <0.2  /  DBili  x   /  AST  15  /  ALT  9   /  AlkPhos  104  [01-01-20 @ 05:50]          Creatinine Trend:  SCr 5.9 [01-01 @ 05:50]  SCr 6.4 [12-31 @ 06:45]  SCr 5.6 [12-30 @ 05:40]  SCr 5.9 [12-29 @ 06:25]  SCr 5.2 [12-28 @ 05:38]        Iron 59, TIBC 225, %sat 26      [12-31-19 @ 06:45]  Ferritin 113      [12-31-19 @ 06:45]  PTH -- (Ca 7.4)      [12-29-19 @ 17:44]   224  HbA1c 6.2      [12-28-19 @ 05:38]  TSH 1.49      [12-28-19 @ 05:38]    HBsAb <3.0      [12-29-19 @ 17:44]  HBsAb Nonreact      [12-29-19 @ 17:44]  HBsAg Nonreact      [12-29-19 @ 17:44]  HBcAb Nonreact      [12-29-19 @ 17:44]  HCV 0.10, Nonreact      [12-29-19 @ 17:44]

## 2020-01-01 NOTE — PROGRESS NOTE ADULT - ASSESSMENT
fluid overload secondary to CKD 5 not on HD yet: patient did not get HD because AVF was infiltrated :   xscr better today   per renal  IV lasix to 80 BID   metolazone 2,5 q48   renal to decide on HD tomorrow   hyperphosphatemia on binders renagel and phoslow   for acidosis on bicarbonate     Anemia stable on iron and epo weekly started     hypocalcemia on calcitriol and calcium     HTN: c/w current meds     DM: hypoglycemia better lantus was decreased to 30 qhs from 40     HLD on statin     CAD and PVD: c/w asa and plavix. history of CABG     DVT PPX hep subc

## 2020-01-02 PROBLEM — I73.9 PERIPHERAL VASCULAR DISEASE, UNSPECIFIED: Chronic | Status: ACTIVE | Noted: 2019-10-21

## 2020-01-02 LAB
ALBUMIN SERPL ELPH-MCNC: 3.7 G/DL — SIGNIFICANT CHANGE UP (ref 3.5–5.2)
ALP SERPL-CCNC: 105 U/L — SIGNIFICANT CHANGE UP (ref 30–115)
ALT FLD-CCNC: 9 U/L — SIGNIFICANT CHANGE UP (ref 0–41)
ANION GAP SERPL CALC-SCNC: 18 MMOL/L — HIGH (ref 7–14)
AST SERPL-CCNC: 14 U/L — SIGNIFICANT CHANGE UP (ref 0–41)
BASOPHILS # BLD AUTO: 0.09 K/UL — SIGNIFICANT CHANGE UP (ref 0–0.2)
BASOPHILS NFR BLD AUTO: 0.9 % — SIGNIFICANT CHANGE UP (ref 0–1)
BILIRUB SERPL-MCNC: 0.2 MG/DL — SIGNIFICANT CHANGE UP (ref 0.2–1.2)
BUN SERPL-MCNC: 77 MG/DL — CRITICAL HIGH (ref 10–20)
CALCIUM SERPL-MCNC: 8.1 MG/DL — LOW (ref 8.5–10.1)
CHLORIDE SERPL-SCNC: 99 MMOL/L — SIGNIFICANT CHANGE UP (ref 98–110)
CO2 SERPL-SCNC: 23 MMOL/L — SIGNIFICANT CHANGE UP (ref 17–32)
CREAT SERPL-MCNC: 6.9 MG/DL — CRITICAL HIGH (ref 0.7–1.5)
EOSINOPHIL # BLD AUTO: 0.33 K/UL — SIGNIFICANT CHANGE UP (ref 0–0.7)
EOSINOPHIL NFR BLD AUTO: 3.5 % — SIGNIFICANT CHANGE UP (ref 0–8)
GLUCOSE BLDC GLUCOMTR-MCNC: 127 MG/DL — HIGH (ref 70–99)
GLUCOSE BLDC GLUCOMTR-MCNC: 142 MG/DL — HIGH (ref 70–99)
GLUCOSE BLDC GLUCOMTR-MCNC: 74 MG/DL — SIGNIFICANT CHANGE UP (ref 70–99)
GLUCOSE BLDC GLUCOMTR-MCNC: 78 MG/DL — SIGNIFICANT CHANGE UP (ref 70–99)
GLUCOSE SERPL-MCNC: 77 MG/DL — SIGNIFICANT CHANGE UP (ref 70–99)
HCT VFR BLD CALC: 28.4 % — LOW (ref 42–52)
HGB BLD-MCNC: 9.3 G/DL — LOW (ref 14–18)
IMM GRANULOCYTES NFR BLD AUTO: 0.2 % — SIGNIFICANT CHANGE UP (ref 0.1–0.3)
LYMPHOCYTES # BLD AUTO: 1.63 K/UL — SIGNIFICANT CHANGE UP (ref 1.2–3.4)
LYMPHOCYTES # BLD AUTO: 17.1 % — LOW (ref 20.5–51.1)
MCHC RBC-ENTMCNC: 30.3 PG — SIGNIFICANT CHANGE UP (ref 27–31)
MCHC RBC-ENTMCNC: 32.7 G/DL — SIGNIFICANT CHANGE UP (ref 32–37)
MCV RBC AUTO: 92.5 FL — SIGNIFICANT CHANGE UP (ref 80–94)
MONOCYTES # BLD AUTO: 0.84 K/UL — HIGH (ref 0.1–0.6)
MONOCYTES NFR BLD AUTO: 8.8 % — SIGNIFICANT CHANGE UP (ref 1.7–9.3)
NEUTROPHILS # BLD AUTO: 6.64 K/UL — HIGH (ref 1.4–6.5)
NEUTROPHILS NFR BLD AUTO: 69.5 % — SIGNIFICANT CHANGE UP (ref 42.2–75.2)
NRBC # BLD: 0 /100 WBCS — SIGNIFICANT CHANGE UP (ref 0–0)
PHOSPHATE SERPL-MCNC: 6.1 MG/DL — HIGH (ref 2.1–4.9)
PLATELET # BLD AUTO: 217 K/UL — SIGNIFICANT CHANGE UP (ref 130–400)
POTASSIUM SERPL-MCNC: 4.8 MMOL/L — SIGNIFICANT CHANGE UP (ref 3.5–5)
POTASSIUM SERPL-SCNC: 4.8 MMOL/L — SIGNIFICANT CHANGE UP (ref 3.5–5)
PROT SERPL-MCNC: 6.5 G/DL — SIGNIFICANT CHANGE UP (ref 6–8)
RBC # BLD: 3.07 M/UL — LOW (ref 4.7–6.1)
RBC # FLD: 12.5 % — SIGNIFICANT CHANGE UP (ref 11.5–14.5)
SODIUM SERPL-SCNC: 140 MMOL/L — SIGNIFICANT CHANGE UP (ref 135–146)
WBC # BLD: 9.55 K/UL — SIGNIFICANT CHANGE UP (ref 4.8–10.8)
WBC # FLD AUTO: 9.55 K/UL — SIGNIFICANT CHANGE UP (ref 4.8–10.8)

## 2020-01-02 PROCEDURE — 99233 SBSQ HOSP IP/OBS HIGH 50: CPT

## 2020-01-02 RX ORDER — NIFEDIPINE 30 MG
60 TABLET, EXTENDED RELEASE 24 HR ORAL DAILY
Refills: 0 | Status: DISCONTINUED | OUTPATIENT
Start: 2020-01-03 | End: 2020-01-04

## 2020-01-02 RX ORDER — CALCIUM ACETATE 667 MG
1334 TABLET ORAL EVERY 8 HOURS
Refills: 0 | Status: DISCONTINUED | OUTPATIENT
Start: 2020-01-02 | End: 2020-01-04

## 2020-01-02 RX ADMIN — Medication 30 MILLIGRAM(S): at 05:24

## 2020-01-02 RX ADMIN — Medication 325 MILLIGRAM(S): at 22:03

## 2020-01-02 RX ADMIN — Medication 650 MILLIGRAM(S): at 17:56

## 2020-01-02 RX ADMIN — HEPARIN SODIUM 5000 UNIT(S): 5000 INJECTION INTRAVENOUS; SUBCUTANEOUS at 05:25

## 2020-01-02 RX ADMIN — Medication 2 TABLET(S): at 14:53

## 2020-01-02 RX ADMIN — Medication 50 MILLIGRAM(S): at 17:56

## 2020-01-02 RX ADMIN — Medication 1334 MILLIGRAM(S): at 12:20

## 2020-01-02 RX ADMIN — Medication 2 TABLET(S): at 05:24

## 2020-01-02 RX ADMIN — HEPARIN SODIUM 5000 UNIT(S): 5000 INJECTION INTRAVENOUS; SUBCUTANEOUS at 14:53

## 2020-01-02 RX ADMIN — SIMVASTATIN 40 MILLIGRAM(S): 20 TABLET, FILM COATED ORAL at 22:03

## 2020-01-02 RX ADMIN — Medication 81 MILLIGRAM(S): at 12:21

## 2020-01-02 RX ADMIN — SEVELAMER CARBONATE 800 MILLIGRAM(S): 2400 POWDER, FOR SUSPENSION ORAL at 08:03

## 2020-01-02 RX ADMIN — Medication 325 MILLIGRAM(S): at 05:24

## 2020-01-02 RX ADMIN — INSULIN GLARGINE 30 UNIT(S): 100 INJECTION, SOLUTION SUBCUTANEOUS at 22:03

## 2020-01-02 RX ADMIN — Medication 667 MILLIGRAM(S): at 08:03

## 2020-01-02 RX ADMIN — Medication 650 MILLIGRAM(S): at 05:24

## 2020-01-02 RX ADMIN — Medication 50 MILLIGRAM(S): at 05:24

## 2020-01-02 RX ADMIN — Medication 325 MILLIGRAM(S): at 14:53

## 2020-01-02 RX ADMIN — Medication 2 TABLET(S): at 22:03

## 2020-01-02 RX ADMIN — CLOPIDOGREL BISULFATE 75 MILLIGRAM(S): 75 TABLET, FILM COATED ORAL at 12:19

## 2020-01-02 RX ADMIN — HEPARIN SODIUM 5000 UNIT(S): 5000 INJECTION INTRAVENOUS; SUBCUTANEOUS at 22:03

## 2020-01-02 RX ADMIN — CALCITRIOL 0.25 MICROGRAM(S): 0.5 CAPSULE ORAL at 12:19

## 2020-01-02 RX ADMIN — Medication 80 MILLIGRAM(S): at 17:57

## 2020-01-02 RX ADMIN — PANTOPRAZOLE SODIUM 40 MILLIGRAM(S): 20 TABLET, DELAYED RELEASE ORAL at 06:09

## 2020-01-02 RX ADMIN — Medication 80 MILLIGRAM(S): at 05:24

## 2020-01-02 RX ADMIN — SEVELAMER CARBONATE 800 MILLIGRAM(S): 2400 POWDER, FOR SUSPENSION ORAL at 12:19

## 2020-01-02 RX ADMIN — Medication 1334 MILLIGRAM(S): at 17:57

## 2020-01-02 RX ADMIN — Medication 12 UNIT(S): at 12:20

## 2020-01-02 RX ADMIN — SEVELAMER CARBONATE 800 MILLIGRAM(S): 2400 POWDER, FOR SUSPENSION ORAL at 17:56

## 2020-01-02 NOTE — PROGRESS NOTE ADULT - SUBJECTIVE AND OBJECTIVE BOX
Hospital Day:  6d    Subjective:    Patient is a 59y old Male who presents with a chief complaint of Dyspnea on exertion (01 Jan 2020 11:11)  This morning patient is sitting up comfortably in bed. He reports he stills has orthopnea and coughing fits when he lays down and sleeps sitting up. Otherwise he denies any new complaints.    Past Medical Hx:   Chronic anemia  [place] (hard of hearing)  OA (osteoarthritis)  Pain in left knee  BPH (benign prostatic hyperplasia)  Carotid artery disease  CAD (coronary artery disease)  HLD (hyperlipidemia)  PAD (peripheral artery disease)  Myocardial infarction  Stented coronary artery  Hypertension  Chronic kidney disease (CKD)  Transient ischemic attack (TIA)  Diabetes mellitus  Stroke  High cholesterol  S/P CABG (coronary artery bypass graft)  CAD (coronary artery disease)  Diabetes    Past Sx:  History of surgery  H/O arterial bypass of lower limb  S/P CABG (coronary artery bypass graft)    Allergies:  No Known Allergies    Current Meds:   Stand Meds:  aspirin enteric coated 81 milliGRAM(s) Oral daily  calcitriol   Capsule 0.25 MICROGram(s) Oral daily  calcium acetate 1334 milliGRAM(s) Oral every 8 hours  calcium carbonate    500 mG (Tums) Chewable 2 Tablet(s) Chew every 8 hours  chlorhexidine 4% Liquid 1 Application(s) Topical <User Schedule>  clopidogrel Tablet 75 milliGRAM(s) Oral daily  dextrose 5%. 1000 milliLiter(s) (50 mL/Hr) IV Continuous <Continuous>  dextrose 50% Injectable 12.5 Gram(s) IV Push once  dextrose 50% Injectable 25 Gram(s) IV Push once  dextrose 50% Injectable 25 Gram(s) IV Push once  epoetin bunny Injectable 5000 Unit(s) SubCutaneous every 7 days  ferrous    sulfate 325 milliGRAM(s) Oral every 8 hours  furosemide   Injectable 80 milliGRAM(s) IV Push two times a day  heparin  Injectable 5000 Unit(s) SubCutaneous every 8 hours  influenza   Vaccine 0.5 milliLiter(s) IntraMuscular once  insulin glargine Injectable (LANTUS) 30 Unit(s) SubCutaneous at bedtime  insulin lispro (HumaLOG) corrective regimen sliding scale   SubCutaneous three times a day before meals  insulin lispro Injectable (HumaLOG) 12 Unit(s) SubCutaneous three times a day before meals  metolazone 2.5 milliGRAM(s) Oral <User Schedule>  metoprolol tartrate 50 milliGRAM(s) Oral two times a day  pantoprazole    Tablet 40 milliGRAM(s) Oral before breakfast  sevelamer carbonate 800 milliGRAM(s) Oral three times a day with meals  simvastatin 40 milliGRAM(s) Oral at bedtime  sodium bicarbonate 650 milliGRAM(s) Oral every 12 hours    PRN Meds:  acetaminophen   Tablet .. 650 milliGRAM(s) Oral every 6 hours PRN Mild Pain (1 - 3)  dextrose 40% Gel 15 Gram(s) Oral once PRN Blood Glucose LESS THAN 70 milliGRAM(s)/deciliter  glucagon  Injectable 1 milliGRAM(s) IntraMuscular once PRN Glucose LESS THAN 70 milligrams/deciliter  melatonin 10 milliGRAM(s) Oral at bedtime PRN Sleep  polyethylene glycol 3350 17 Gram(s) Oral daily PRN Constipation    HOME MEDICATIONS:  aspirin 81 mg oral tablet: 1 tab(s) orally once a day  Bydureon Pen 2 mg subcutaneous injection, extended release: 2 milligram(s) subcutaneous once a week  Levemir 100 units/mL subcutaneous solution: 40 unit(s) subcutaneous once a day (at bedtime). 20 units 10/23/19  Metoprolol Tartrate 50 mg oral tablet: 1 tab(s) orally 2 times a day  Plavix 75 mg oral tablet: 1 tab(s) orally once a day  simvastatin 40 mg oral tablet: 1 tab(s) orally once a day (at bedtime)      Vital Signs:   T(F): 97.6 (01-02-20 @ 05:49), Max: 97.6 (01-02-20 @ 05:49)  HR: 83 (01-02-20 @ 05:49) (77 - 83)  BP: 187/88 (01-02-20 @ 05:49) (132/61 - 187/88)  RR: 18 (01-02-20 @ 05:49) (17 - 18)  SpO2: 98% (01-01-20 @ 20:00) (98% - 98%)      01-01-20 @ 07:01  -  01-02-20 @ 07:00  --------------------------------------------------------  IN: 0 mL / OUT: 2550 mL / NET: -2550 mL    01-02-20 @ 07:01  -  01-02-20 @ 11:49  --------------------------------------------------------  IN: 0 mL / OUT: 600 mL / NET: -600 mL        Physical Exam:   GENERAL: NAD  HEENT: NCAT  CHEST/LUNG: CTAB  HEART: Regular rate and rhythm; s1 s2 appreciated, No murmurs, rubs, or gallops  ABDOMEN: Soft, Nontender, Nondistended; Bowel sounds present  EXTREMITIES: 3+ LE edema b/l  NERVOUS SYSTEM:  Alert & Oriented X3        Labs:                         9.3    9.55  )-----------( 217      ( 02 Jan 2020 05:35 )             28.4     Neutophil% 69.5, Lymphocyte% 17.1, Monocyte% 8.8, Bands% 0.2 01-02-20 @ 05:35    02 Jan 2020 05:35    140    |  99     |  77     ----------------------------<  77     4.8     |  23     |  6.9      Ca    8.1        02 Jan 2020 05:35  Phos  6.1       02 Jan 2020 05:35    TPro  6.5    /  Alb  3.7    /  TBili  0.2    /  DBili  x      /  AST  14     /  ALT  9      /  AlkPhos  105    02 Jan 2020 05:35          Hemoglobin A1C, Whole Blood: 6.2 % (12-28-19 @ 05:38)    Serum Pro-Brain Natriuretic Peptide: 70900 pg/mL (12-27-19 @ 09:39)      Iron 59, TIBC 225, %Sat 26 Ferritin 113 12-31-19 @ 06:45            Assessment and Plan:     59 year old male with history of chronic kidney disease IV not on hemodialysis, Coronary Artery Disease status post CABG, Diabetes Mellitus II, PVD presenting with shortness of breath for 1 week.     #SOB secondary to fluid overload secondary to CKD 5 and now requiring HD  - Patient not clinically improving on diuretics with uptrending Cr and worsening pleural effusions on Xray  - BNP 12,000 with bilateral effusions on Chest X-Ray and CT  - echo shows EF 51% with grade 2 diastolic dysfunction  - Caution with fluids, 1L fluid restrict, lasix, metalozone  - Cardio recs appreciated  - As per nephro: Will need HD; due to immature AVF, will require Tesio placement first    #CAD status post CABG  -Continue home ASA, Plavix, simvastatin    #PVD  -LLE edema caused by venous insufficiency worse in L than R  -duplex neg     #hyperphosphatemia   - sevelamir, calcium carbonate, phoslo    #anemia check iron studies   - Iron/TIBC/ferritin WNL  - Begin iron sulfate and procrit as per nephro    #elevated troponins likely demand in the setting of CKD5   - has history if CABG   - on asa and plavix   - no further intervention per cardiology   - echo showed normal EF     #DM  -Basal/bolus  -A1c 6.2    #HTN  -Continue home metoprolol    #Elevated Alk Phos  -132 on admission, previously normal  -Monitor    #Prolonged QT improved down to 446 from 490s   -Monitor QT  -Avoid QT-prolonging agents    #DVT PPX: subcutaneous heparin  #GI PPX: Protonix  #Diet: DASH, CC, renal, 1L fluid restrict  #Activity: As tolerated  #Dispo: Back to home when medically stable  #CODE: FULL Hospital Day:  6d    Subjective:    Patient is a 59y old Male who presents with a chief complaint of Dyspnea on exertion (01 Jan 2020 11:11)  This morning patient is sitting up comfortably in bed. He reports he stills has orthopnea and coughing fits when he lays down and sleeps sitting up. Otherwise he denies any new complaints.    Past Medical Hx:   Chronic anemia  [place] (hard of hearing)  OA (osteoarthritis)  Pain in left knee  BPH (benign prostatic hyperplasia)  Carotid artery disease  CAD (coronary artery disease)  HLD (hyperlipidemia)  PAD (peripheral artery disease)  Myocardial infarction  Stented coronary artery  Hypertension  Chronic kidney disease (CKD)  Transient ischemic attack (TIA)  Diabetes mellitus  Stroke  High cholesterol  S/P CABG (coronary artery bypass graft)  CAD (coronary artery disease)  Diabetes    Past Sx:  History of surgery  H/O arterial bypass of lower limb  S/P CABG (coronary artery bypass graft)    Allergies:  No Known Allergies    Current Meds:   Stand Meds:  aspirin enteric coated 81 milliGRAM(s) Oral daily  calcitriol   Capsule 0.25 MICROGram(s) Oral daily  calcium acetate 1334 milliGRAM(s) Oral every 8 hours  calcium carbonate    500 mG (Tums) Chewable 2 Tablet(s) Chew every 8 hours  chlorhexidine 4% Liquid 1 Application(s) Topical <User Schedule>  clopidogrel Tablet 75 milliGRAM(s) Oral daily  dextrose 5%. 1000 milliLiter(s) (50 mL/Hr) IV Continuous <Continuous>  dextrose 50% Injectable 12.5 Gram(s) IV Push once  dextrose 50% Injectable 25 Gram(s) IV Push once  dextrose 50% Injectable 25 Gram(s) IV Push once  epoetin bunny Injectable 5000 Unit(s) SubCutaneous every 7 days  ferrous    sulfate 325 milliGRAM(s) Oral every 8 hours  furosemide   Injectable 80 milliGRAM(s) IV Push two times a day  heparin  Injectable 5000 Unit(s) SubCutaneous every 8 hours  influenza   Vaccine 0.5 milliLiter(s) IntraMuscular once  insulin glargine Injectable (LANTUS) 30 Unit(s) SubCutaneous at bedtime  insulin lispro (HumaLOG) corrective regimen sliding scale   SubCutaneous three times a day before meals  insulin lispro Injectable (HumaLOG) 12 Unit(s) SubCutaneous three times a day before meals  metolazone 2.5 milliGRAM(s) Oral <User Schedule>  metoprolol tartrate 50 milliGRAM(s) Oral two times a day  pantoprazole    Tablet 40 milliGRAM(s) Oral before breakfast  sevelamer carbonate 800 milliGRAM(s) Oral three times a day with meals  simvastatin 40 milliGRAM(s) Oral at bedtime  sodium bicarbonate 650 milliGRAM(s) Oral every 12 hours    PRN Meds:  acetaminophen   Tablet .. 650 milliGRAM(s) Oral every 6 hours PRN Mild Pain (1 - 3)  dextrose 40% Gel 15 Gram(s) Oral once PRN Blood Glucose LESS THAN 70 milliGRAM(s)/deciliter  glucagon  Injectable 1 milliGRAM(s) IntraMuscular once PRN Glucose LESS THAN 70 milligrams/deciliter  melatonin 10 milliGRAM(s) Oral at bedtime PRN Sleep  polyethylene glycol 3350 17 Gram(s) Oral daily PRN Constipation    HOME MEDICATIONS:  aspirin 81 mg oral tablet: 1 tab(s) orally once a day  Bydureon Pen 2 mg subcutaneous injection, extended release: 2 milligram(s) subcutaneous once a week  Levemir 100 units/mL subcutaneous solution: 40 unit(s) subcutaneous once a day (at bedtime). 20 units 10/23/19  Metoprolol Tartrate 50 mg oral tablet: 1 tab(s) orally 2 times a day  Plavix 75 mg oral tablet: 1 tab(s) orally once a day  simvastatin 40 mg oral tablet: 1 tab(s) orally once a day (at bedtime)      Vital Signs:   T(F): 97.6 (01-02-20 @ 05:49), Max: 97.6 (01-02-20 @ 05:49)  HR: 83 (01-02-20 @ 05:49) (77 - 83)  BP: 187/88 (01-02-20 @ 05:49) (132/61 - 187/88)  RR: 18 (01-02-20 @ 05:49) (17 - 18)  SpO2: 98% (01-01-20 @ 20:00) (98% - 98%)      01-01-20 @ 07:01  -  01-02-20 @ 07:00  --------------------------------------------------------  IN: 0 mL / OUT: 2550 mL / NET: -2550 mL    01-02-20 @ 07:01  -  01-02-20 @ 11:49  --------------------------------------------------------  IN: 0 mL / OUT: 600 mL / NET: -600 mL        Physical Exam:   GENERAL: NAD  HEENT: NCAT  CHEST/LUNG: CTAB  HEART: Regular rate and rhythm; s1 s2 appreciated, No murmurs, rubs, or gallops  ABDOMEN: Soft, Nontender, Nondistended; Bowel sounds present  EXTREMITIES: 3+ LE edema b/l  NERVOUS SYSTEM:  Alert & Oriented X3        Labs:                         9.3    9.55  )-----------( 217      ( 02 Jan 2020 05:35 )             28.4     Neutophil% 69.5, Lymphocyte% 17.1, Monocyte% 8.8, Bands% 0.2 01-02-20 @ 05:35    02 Jan 2020 05:35    140    |  99     |  77     ----------------------------<  77     4.8     |  23     |  6.9      Ca    8.1        02 Jan 2020 05:35  Phos  6.1       02 Jan 2020 05:35    TPro  6.5    /  Alb  3.7    /  TBili  0.2    /  DBili  x      /  AST  14     /  ALT  9      /  AlkPhos  105    02 Jan 2020 05:35          Hemoglobin A1C, Whole Blood: 6.2 % (12-28-19 @ 05:38)    Serum Pro-Brain Natriuretic Peptide: 30946 pg/mL (12-27-19 @ 09:39)      Iron 59, TIBC 225, %Sat 26 Ferritin 113 12-31-19 @ 06:45            Assessment and Plan:     59 year old male with history of chronic kidney disease IV not on hemodialysis, Coronary Artery Disease status post CABG, Diabetes Mellitus II, PVD presenting with shortness of breath for 1 week.     #SOB secondary to fluid overload secondary to CKD 5 and now requiring HD  - Patient not clinically improving on diuretics with uptrending Cr  - BNP 12,000 with bilateral effusions on Chest X-Ray and CT  - echo shows EF 51% with grade 2 diastolic dysfunction  - Caution with fluids, 1L fluid restrict, lasix, metalozone  - Cardio recs appreciated  - As per nephro: Will need HD; due to immature AVF, will require Tesio placement first    #CAD status post CABG  -Continue home ASA, Plavix, simvastatin    #PVD  -LLE edema caused by venous insufficiency worse in L than R  -duplex neg     #hyperphosphatemia   - sevelamir, calcium carbonate, phoslo    #anemia check iron studies   - Iron/TIBC/ferritin WNL  - Begin iron sulfate and procrit as per nephro    #elevated troponins likely demand in the setting of CKD5   - has history if CABG   - on asa and plavix   - no further intervention per cardiology   - echo showed normal EF     #DM  -Basal/bolus  -A1c 6.2    #HTN  -Continue home metoprolol    #Elevated Alk Phos  -132 on admission, previously normal  -Monitor    #Prolonged QT improved down to 446 from 490s   -Monitor QT  -Avoid QT-prolonging agents    #DVT PPX: subcutaneous heparin  #GI PPX: Protonix  #Diet: DASH, CC, renal, 1L fluid restrict  #Activity: As tolerated  #Dispo: Back to home when medically stable  #CODE: FULL Hospital Day:  6d    Subjective:    Patient is a 59y old Male who presents with a chief complaint of Dyspnea on exertion (01 Jan 2020 11:11)  This morning patient is sitting up comfortably in bed. He reports he stills has orthopnea and coughing fits when he lays down and sleeps sitting up. Otherwise he denies any new complaints.    Past Medical Hx:   Chronic anemia  [place] (hard of hearing)  OA (osteoarthritis)  Pain in left knee  BPH (benign prostatic hyperplasia)  Carotid artery disease  CAD (coronary artery disease)  HLD (hyperlipidemia)  PAD (peripheral artery disease)  Myocardial infarction  Stented coronary artery  Hypertension  Chronic kidney disease (CKD)  Transient ischemic attack (TIA)  Diabetes mellitus  Stroke  High cholesterol  S/P CABG (coronary artery bypass graft)  CAD (coronary artery disease)  Diabetes    Past Sx:  History of surgery  H/O arterial bypass of lower limb  S/P CABG (coronary artery bypass graft)    Allergies:  No Known Allergies    Current Meds:   Stand Meds:  aspirin enteric coated 81 milliGRAM(s) Oral daily  calcitriol   Capsule 0.25 MICROGram(s) Oral daily  calcium acetate 1334 milliGRAM(s) Oral every 8 hours  calcium carbonate    500 mG (Tums) Chewable 2 Tablet(s) Chew every 8 hours  chlorhexidine 4% Liquid 1 Application(s) Topical <User Schedule>  clopidogrel Tablet 75 milliGRAM(s) Oral daily  dextrose 5%. 1000 milliLiter(s) (50 mL/Hr) IV Continuous <Continuous>  dextrose 50% Injectable 12.5 Gram(s) IV Push once  dextrose 50% Injectable 25 Gram(s) IV Push once  dextrose 50% Injectable 25 Gram(s) IV Push once  epoetin bunny Injectable 5000 Unit(s) SubCutaneous every 7 days  ferrous    sulfate 325 milliGRAM(s) Oral every 8 hours  furosemide   Injectable 80 milliGRAM(s) IV Push two times a day  heparin  Injectable 5000 Unit(s) SubCutaneous every 8 hours  influenza   Vaccine 0.5 milliLiter(s) IntraMuscular once  insulin glargine Injectable (LANTUS) 30 Unit(s) SubCutaneous at bedtime  insulin lispro (HumaLOG) corrective regimen sliding scale   SubCutaneous three times a day before meals  insulin lispro Injectable (HumaLOG) 12 Unit(s) SubCutaneous three times a day before meals  metolazone 2.5 milliGRAM(s) Oral <User Schedule>  metoprolol tartrate 50 milliGRAM(s) Oral two times a day  pantoprazole    Tablet 40 milliGRAM(s) Oral before breakfast  sevelamer carbonate 800 milliGRAM(s) Oral three times a day with meals  simvastatin 40 milliGRAM(s) Oral at bedtime  sodium bicarbonate 650 milliGRAM(s) Oral every 12 hours    PRN Meds:  acetaminophen   Tablet .. 650 milliGRAM(s) Oral every 6 hours PRN Mild Pain (1 - 3)  dextrose 40% Gel 15 Gram(s) Oral once PRN Blood Glucose LESS THAN 70 milliGRAM(s)/deciliter  glucagon  Injectable 1 milliGRAM(s) IntraMuscular once PRN Glucose LESS THAN 70 milligrams/deciliter  melatonin 10 milliGRAM(s) Oral at bedtime PRN Sleep  polyethylene glycol 3350 17 Gram(s) Oral daily PRN Constipation    HOME MEDICATIONS:  aspirin 81 mg oral tablet: 1 tab(s) orally once a day  Bydureon Pen 2 mg subcutaneous injection, extended release: 2 milligram(s) subcutaneous once a week  Levemir 100 units/mL subcutaneous solution: 40 unit(s) subcutaneous once a day (at bedtime). 20 units 10/23/19  Metoprolol Tartrate 50 mg oral tablet: 1 tab(s) orally 2 times a day  Plavix 75 mg oral tablet: 1 tab(s) orally once a day  simvastatin 40 mg oral tablet: 1 tab(s) orally once a day (at bedtime)      Vital Signs:   T(F): 97.6 (01-02-20 @ 05:49), Max: 97.6 (01-02-20 @ 05:49)  HR: 83 (01-02-20 @ 05:49) (77 - 83)  BP: 187/88 (01-02-20 @ 05:49) (132/61 - 187/88)  RR: 18 (01-02-20 @ 05:49) (17 - 18)  SpO2: 98% (01-01-20 @ 20:00) (98% - 98%)      01-01-20 @ 07:01  -  01-02-20 @ 07:00  --------------------------------------------------------  IN: 0 mL / OUT: 2550 mL / NET: -2550 mL    01-02-20 @ 07:01  -  01-02-20 @ 11:49  --------------------------------------------------------  IN: 0 mL / OUT: 600 mL / NET: -600 mL        Physical Exam:   GENERAL: NAD  HEENT: NCAT  Pulm: CTAB  CV: Regular rate and rhythm; s1 s2 appreciated, No murmurs, rubs, or gallops  GI: Soft, Nontender, Nondistended; Bowel sounds present  EXTREMITIES: 3+ LE edema b/l  NERVOUS SYSTEM:  Alert & Oriented X3        Labs:                         9.3    9.55  )-----------( 217      ( 02 Jan 2020 05:35 )             28.4     Neutophil% 69.5, Lymphocyte% 17.1, Monocyte% 8.8, Bands% 0.2 01-02-20 @ 05:35    02 Jan 2020 05:35    140    |  99     |  77     ----------------------------<  77     4.8     |  23     |  6.9      Ca    8.1        02 Jan 2020 05:35  Phos  6.1       02 Jan 2020 05:35    TPro  6.5    /  Alb  3.7    /  TBili  0.2    /  DBili  x      /  AST  14     /  ALT  9      /  AlkPhos  105    02 Jan 2020 05:35          Hemoglobin A1C, Whole Blood: 6.2 % (12-28-19 @ 05:38)    Serum Pro-Brain Natriuretic Peptide: 80184 pg/mL (12-27-19 @ 09:39)      Iron 59, TIBC 225, %Sat 26 Ferritin 113 12-31-19 @ 06:45            Assessment and Plan:     59 year old male with history of chronic kidney disease IV not on hemodialysis, Coronary Artery Disease status post CABG, Diabetes Mellitus II, PVD presenting with shortness of breath for 1 week.     #SOB secondary to fluid overload secondary to CKD 5 and now requiring HD  - Patient not clinically improving on diuretics with uptrending Cr  - BNP 12,000 with bilateral effusions on Chest X-Ray and CT  - echo shows EF 51% with grade 2 diastolic dysfunction  - Caution with fluids, 1L fluid restrict, lasix, metalozone  - Cardio recs appreciated  - As per nephro: Will need HD; due to immature AVF, will require Tesio placement first    #CAD status post CABG  -Continue home ASA, Plavix, simvastatin    #PVD  -LLE edema caused by venous insufficiency worse in L than R  -duplex neg     #hyperphosphatemia   - sevelamir, calcium carbonate, phoslo    #anemia check iron studies   - Iron/TIBC/ferritin WNL  - Begin iron sulfate and procrit as per nephro    #elevated troponins likely demand in the setting of CKD5   - has history if CABG   - on asa and plavix   - no further intervention per cardiology   - echo showed normal EF     #DM  -Basal/bolus  -A1c 6.2    #HTN  -Continue home metoprolol    #Elevated Alk Phos  -132 on admission, previously normal  -Monitor    #Prolonged QT improved down to 446 from 490s   -Monitor QT  -Avoid QT-prolonging agents    #DVT PPX: subcutaneous heparin  #GI PPX: Protonix  #Diet: DASH, CC, renal, 1L fluid restrict  #Activity: As tolerated  #Dispo: Back to home when medically stable  #CODE: FULL

## 2020-01-02 NOTE — PROGRESS NOTE ADULT - SUBJECTIVE AND OBJECTIVE BOX
Nephrology progress note    Patient was seen and examined, events over the last 24 h noted .      Allergies:  No Known Allergies    Hospital Medications:   MEDICATIONS  (STANDING):  aspirin enteric coated 81 milliGRAM(s) Oral daily  calcitriol   Capsule 0.25 MICROGram(s) Oral daily  calcium acetate 1334 milliGRAM(s) Oral every 8 hours  calcium carbonate    500 mG (Tums) Chewable 2 Tablet(s) Chew every 8 hours  chlorhexidine 4% Liquid 1 Application(s) Topical <User Schedule>  clopidogrel Tablet 75 milliGRAM(s) Oral daily  dextrose 5%. 1000 milliLiter(s) (50 mL/Hr) IV Continuous <Continuous>  dextrose 50% Injectable 12.5 Gram(s) IV Push once  dextrose 50% Injectable 25 Gram(s) IV Push once  dextrose 50% Injectable 25 Gram(s) IV Push once  epoetin bunny Injectable 5000 Unit(s) SubCutaneous every 7 days  ferrous    sulfate 325 milliGRAM(s) Oral every 8 hours  furosemide   Injectable 80 milliGRAM(s) IV Push two times a day  heparin  Injectable 5000 Unit(s) SubCutaneous every 8 hours  influenza   Vaccine 0.5 milliLiter(s) IntraMuscular once  insulin glargine Injectable (LANTUS) 30 Unit(s) SubCutaneous at bedtime  insulin lispro (HumaLOG) corrective regimen sliding scale   SubCutaneous three times a day before meals  insulin lispro Injectable (HumaLOG) 12 Unit(s) SubCutaneous three times a day before meals  metolazone 2.5 milliGRAM(s) Oral <User Schedule>  metoprolol tartrate 50 milliGRAM(s) Oral two times a day  pantoprazole    Tablet 40 milliGRAM(s) Oral before breakfast  sevelamer carbonate 800 milliGRAM(s) Oral three times a day with meals  simvastatin 40 milliGRAM(s) Oral at bedtime  sodium bicarbonate 650 milliGRAM(s) Oral every 12 hours        VITALS:  T(F): 96 (01-02-20 @ 13:42), Max: 97.6 (01-02-20 @ 05:49)  HR: 80 (01-02-20 @ 13:42)  BP: 155/78 (01-02-20 @ 13:42)  RR: 18 (01-02-20 @ 13:42)  SpO2: 98% (01-01-20 @ 20:00)  Wt(kg): --    12-31 @ 07:01  -  01-01 @ 07:00  --------------------------------------------------------  IN: 708 mL / OUT: 2425 mL / NET: -1717 mL    01-01 @ 07:01 - 01-02 @ 07:00  --------------------------------------------------------  IN: 0 mL / OUT: 2550 mL / NET: -2550 mL    01-02 @ 07:01 - 01-02 @ 15:44  --------------------------------------------------------  IN: 0 mL / OUT: 1150 mL / NET: -1150 mL          PHYSICAL EXAM:  Constitutional: NAD  HEENT: anicteric sclera, oropharynx clear, MMM  Neck: No JVD  Respiratory: CTAB, no wheezes, rales or rhonchi  Cardiovascular: S1, S2, RRR  Gastrointestinal: BS+, soft, NT/ND  Extremities: No cyanosis or clubbing. ++ peripheral edema  :  No schnieder.   Skin: No rashes    LABS:  01-02    140  |  99  |  77<HH>  ----------------------------<  77  4.8   |  23  |  6.9<HH>    Ca    8.1<L>      02 Jan 2020 05:35  Phos  6.1     01-02    TPro  6.5  /  Alb  3.7  /  TBili  0.2  /  DBili      /  AST  14  /  ALT  9   /  AlkPhos  105  01-02                          9.3    9.55  )-----------( 217      ( 02 Jan 2020 05:35 )             28.4       Urine Studies:      RADIOLOGY & ADDITIONAL STUDIES:

## 2020-01-02 NOTE — PROGRESS NOTE ADULT - ASSESSMENT
Pt with CKD stage 5,  started on HD due to SOB   Infiltrated so HD held since Monday  Stil ledematous in spite of lasix + metolazone AVF in place in anticipation of HD, DM, HTN, CAD, CHF presents with worsening SOB for 1 week. Admits to have ran out of lasix. No CP.  CTA neg for PE, b/l pleural effusions.  CKD 5 - with fluid overload:  # will resume HD tomorrow  # no need for HD today  # cont  lasix to 80 iv q 12   #cont t sodium bicarbonate 650 q 12  # ph noted, on renagel, phoslo ,, pth noted, cont calcitriol 0.25 q 24  #BP not well controlled, volume related, diuretics adjusted   #h/h noted, start feso4 po q 8, and procrit 5000 units s/c weekly   # will follow

## 2020-01-02 NOTE — PROGRESS NOTE ADULT - ATTENDING COMMENTS
patient seen and examined independently     #Progress Note Handoff  Pending (specify):  OPT HD setup. follow labs daily. resp status to improve further. iron studies   Family discussion:patient   Disposition: Home
patient seen and examined independently    fluid overload secondary to CKD 5 not on HD yet: patient did not get HD because AVF was infiltrated :   patient urine output is arounf 900cc/24 hours   per renal  IV lasix to 80 BID   metolazone 2,5 q48   renal to decide on HD   renal follow  up called   hyperphosphatemia on binders renagel and phoslow was increased today   for acidosis on bicarbonate     Anemia stable on iron and epo weekly started     hypocalcemia on calcitriol and calcium     HTN: c/w current meds     DM: hypoglycemia better lantus was decreased to 30 qhs from 40     HLD on statin     CAD and PVD: c/w asa and plavix. history of CABG     DVT PPX hep subc

## 2020-01-03 LAB
ALBUMIN SERPL ELPH-MCNC: 3.5 G/DL — SIGNIFICANT CHANGE UP (ref 3.5–5.2)
ALP SERPL-CCNC: 100 U/L — SIGNIFICANT CHANGE UP (ref 30–115)
ALT FLD-CCNC: 10 U/L — SIGNIFICANT CHANGE UP (ref 0–41)
ANION GAP SERPL CALC-SCNC: 22 MMOL/L — HIGH (ref 7–14)
AST SERPL-CCNC: 16 U/L — SIGNIFICANT CHANGE UP (ref 0–41)
BASOPHILS # BLD AUTO: 0.08 K/UL — SIGNIFICANT CHANGE UP (ref 0–0.2)
BASOPHILS NFR BLD AUTO: 0.9 % — SIGNIFICANT CHANGE UP (ref 0–1)
BILIRUB SERPL-MCNC: <0.2 MG/DL — SIGNIFICANT CHANGE UP (ref 0.2–1.2)
BUN SERPL-MCNC: 80 MG/DL — CRITICAL HIGH (ref 10–20)
CALCIUM SERPL-MCNC: 8.2 MG/DL — LOW (ref 8.5–10.1)
CHLORIDE SERPL-SCNC: 98 MMOL/L — SIGNIFICANT CHANGE UP (ref 98–110)
CO2 SERPL-SCNC: 20 MMOL/L — SIGNIFICANT CHANGE UP (ref 17–32)
CREAT SERPL-MCNC: 6.9 MG/DL — CRITICAL HIGH (ref 0.7–1.5)
EOSINOPHIL # BLD AUTO: 0.31 K/UL — SIGNIFICANT CHANGE UP (ref 0–0.7)
EOSINOPHIL NFR BLD AUTO: 3.4 % — SIGNIFICANT CHANGE UP (ref 0–8)
GLUCOSE BLDC GLUCOMTR-MCNC: 112 MG/DL — HIGH (ref 70–99)
GLUCOSE BLDC GLUCOMTR-MCNC: 129 MG/DL — HIGH (ref 70–99)
GLUCOSE BLDC GLUCOMTR-MCNC: 256 MG/DL — HIGH (ref 70–99)
GLUCOSE BLDC GLUCOMTR-MCNC: 84 MG/DL — SIGNIFICANT CHANGE UP (ref 70–99)
GLUCOSE SERPL-MCNC: 73 MG/DL — SIGNIFICANT CHANGE UP (ref 70–99)
HCT VFR BLD CALC: 27.4 % — LOW (ref 42–52)
HGB BLD-MCNC: 8.9 G/DL — LOW (ref 14–18)
IMM GRANULOCYTES NFR BLD AUTO: 0.4 % — HIGH (ref 0.1–0.3)
LYMPHOCYTES # BLD AUTO: 1.62 K/UL — SIGNIFICANT CHANGE UP (ref 1.2–3.4)
LYMPHOCYTES # BLD AUTO: 17.7 % — LOW (ref 20.5–51.1)
MCHC RBC-ENTMCNC: 29.9 PG — SIGNIFICANT CHANGE UP (ref 27–31)
MCHC RBC-ENTMCNC: 32.5 G/DL — SIGNIFICANT CHANGE UP (ref 32–37)
MCV RBC AUTO: 91.9 FL — SIGNIFICANT CHANGE UP (ref 80–94)
MONOCYTES # BLD AUTO: 0.71 K/UL — HIGH (ref 0.1–0.6)
MONOCYTES NFR BLD AUTO: 7.8 % — SIGNIFICANT CHANGE UP (ref 1.7–9.3)
NEUTROPHILS # BLD AUTO: 6.4 K/UL — SIGNIFICANT CHANGE UP (ref 1.4–6.5)
NEUTROPHILS NFR BLD AUTO: 69.8 % — SIGNIFICANT CHANGE UP (ref 42.2–75.2)
NRBC # BLD: 0 /100 WBCS — SIGNIFICANT CHANGE UP (ref 0–0)
PHOSPHATE SERPL-MCNC: 5.6 MG/DL — HIGH (ref 2.1–4.9)
PLATELET # BLD AUTO: 230 K/UL — SIGNIFICANT CHANGE UP (ref 130–400)
POTASSIUM SERPL-MCNC: 4.7 MMOL/L — SIGNIFICANT CHANGE UP (ref 3.5–5)
POTASSIUM SERPL-SCNC: 4.7 MMOL/L — SIGNIFICANT CHANGE UP (ref 3.5–5)
PROT SERPL-MCNC: 6.3 G/DL — SIGNIFICANT CHANGE UP (ref 6–8)
RBC # BLD: 2.98 M/UL — LOW (ref 4.7–6.1)
RBC # FLD: 12.3 % — SIGNIFICANT CHANGE UP (ref 11.5–14.5)
SODIUM SERPL-SCNC: 140 MMOL/L — SIGNIFICANT CHANGE UP (ref 135–146)
WBC # BLD: 9.16 K/UL — SIGNIFICANT CHANGE UP (ref 4.8–10.8)
WBC # FLD AUTO: 9.16 K/UL — SIGNIFICANT CHANGE UP (ref 4.8–10.8)

## 2020-01-03 PROCEDURE — 99233 SBSQ HOSP IP/OBS HIGH 50: CPT

## 2020-01-03 RX ORDER — DOXERCALCIFEROL 2.5 UG/1
2 CAPSULE ORAL
Refills: 0 | Status: DISCONTINUED | OUTPATIENT
Start: 2020-01-03 | End: 2020-01-04

## 2020-01-03 RX ORDER — DARBEPOETIN ALFA IN POLYSORBAT 200MCG/0.4
20 PEN INJECTOR (ML) SUBCUTANEOUS
Refills: 0 | Status: DISCONTINUED | OUTPATIENT
Start: 2020-01-03 | End: 2020-01-04

## 2020-01-03 RX ORDER — FUROSEMIDE 40 MG
80 TABLET ORAL EVERY 12 HOURS
Refills: 0 | Status: DISCONTINUED | OUTPATIENT
Start: 2020-01-03 | End: 2020-01-04

## 2020-01-03 RX ADMIN — SEVELAMER CARBONATE 800 MILLIGRAM(S): 2400 POWDER, FOR SUSPENSION ORAL at 17:03

## 2020-01-03 RX ADMIN — Medication 80 MILLIGRAM(S): at 17:11

## 2020-01-03 RX ADMIN — Medication 1334 MILLIGRAM(S): at 17:02

## 2020-01-03 RX ADMIN — Medication 325 MILLIGRAM(S): at 22:07

## 2020-01-03 RX ADMIN — Medication 12 UNIT(S): at 17:02

## 2020-01-03 RX ADMIN — CLOPIDOGREL BISULFATE 75 MILLIGRAM(S): 75 TABLET, FILM COATED ORAL at 13:27

## 2020-01-03 RX ADMIN — Medication 2 TABLET(S): at 22:07

## 2020-01-03 RX ADMIN — Medication 50 MILLIGRAM(S): at 17:05

## 2020-01-03 RX ADMIN — Medication 50 MILLIGRAM(S): at 06:11

## 2020-01-03 RX ADMIN — Medication 325 MILLIGRAM(S): at 14:27

## 2020-01-03 RX ADMIN — Medication 325 MILLIGRAM(S): at 06:11

## 2020-01-03 RX ADMIN — Medication 2 TABLET(S): at 13:30

## 2020-01-03 RX ADMIN — HEPARIN SODIUM 5000 UNIT(S): 5000 INJECTION INTRAVENOUS; SUBCUTANEOUS at 06:10

## 2020-01-03 RX ADMIN — HEPARIN SODIUM 5000 UNIT(S): 5000 INJECTION INTRAVENOUS; SUBCUTANEOUS at 22:06

## 2020-01-03 RX ADMIN — Medication 12 UNIT(S): at 13:25

## 2020-01-03 RX ADMIN — HEPARIN SODIUM 5000 UNIT(S): 5000 INJECTION INTRAVENOUS; SUBCUTANEOUS at 13:30

## 2020-01-03 RX ADMIN — PANTOPRAZOLE SODIUM 40 MILLIGRAM(S): 20 TABLET, DELAYED RELEASE ORAL at 06:11

## 2020-01-03 RX ADMIN — Medication 60 MILLIGRAM(S): at 06:11

## 2020-01-03 RX ADMIN — Medication 650 MILLIGRAM(S): at 06:10

## 2020-01-03 RX ADMIN — Medication 20 MICROGRAM(S): at 11:53

## 2020-01-03 RX ADMIN — DOXERCALCIFEROL 2 MICROGRAM(S): 2.5 CAPSULE ORAL at 11:54

## 2020-01-03 RX ADMIN — SIMVASTATIN 40 MILLIGRAM(S): 20 TABLET, FILM COATED ORAL at 22:07

## 2020-01-03 RX ADMIN — Medication 80 MILLIGRAM(S): at 06:10

## 2020-01-03 RX ADMIN — INSULIN GLARGINE 30 UNIT(S): 100 INJECTION, SOLUTION SUBCUTANEOUS at 22:07

## 2020-01-03 RX ADMIN — Medication 81 MILLIGRAM(S): at 13:26

## 2020-01-03 RX ADMIN — Medication 1334 MILLIGRAM(S): at 08:06

## 2020-01-03 RX ADMIN — SEVELAMER CARBONATE 800 MILLIGRAM(S): 2400 POWDER, FOR SUSPENSION ORAL at 13:28

## 2020-01-03 RX ADMIN — Medication 3: at 17:03

## 2020-01-03 RX ADMIN — SEVELAMER CARBONATE 800 MILLIGRAM(S): 2400 POWDER, FOR SUSPENSION ORAL at 08:06

## 2020-01-03 RX ADMIN — Medication 2 TABLET(S): at 06:11

## 2020-01-03 NOTE — DIETITIAN INITIAL EVALUATION ADULT. - PHYSICAL APPEARANCE
BMI 27.0, alert&oriented, skin: intact (BS 21)/overweight BMI 27.0 (using height obtained from previous admit 180.3cm), alert&oriented, skin: intact (BS 21)/overweight

## 2020-01-03 NOTE — DIETITIAN INITIAL EVALUATION ADULT. - ENERGY NEEDS
calorie 2052-2565kcal (MSJ x 1.2-1.5 AF) for ESRD on HD  protein 106-132g (1.2-1.5g/kg CBW) as above  fluid 1L + UOP or per LIP

## 2020-01-03 NOTE — DIETITIAN INITIAL EVALUATION ADULT. - OTHER INFO
P/w: dyspnea on exertion. SOB secondary to fluid overload secondary to CKD 5 and now requiring HD- Patient not clinically improving on diuretics with uptrending Cr. #CAD status post CABG. anemia check iron studies. DM: basal/bolus insulin.

## 2020-01-03 NOTE — PROGRESS NOTE ADULT - SUBJECTIVE AND OBJECTIVE BOX
Nephrology progress note    THIS IS AN INCOMPLETE NOTE . FULL NOTE TO FOLLOW SHORTLY    Patient is seen and examined, events over the last 24 h noted .    Allergies:  No Known Allergies    Hospital Medications:   MEDICATIONS  (STANDING):  aspirin enteric coated 81 milliGRAM(s) Oral daily  calcitriol   Capsule 0.25 MICROGram(s) Oral daily  calcium acetate 1334 milliGRAM(s) Oral every 8 hours  calcium carbonate    500 mG (Tums) Chewable 2 Tablet(s) Chew every 8 hours  clopidogrel Tablet 75 milliGRAM(s) Oral daily  dextrose 5%. 1000 milliLiter(s) (50 mL/Hr) IV Continuous <Continuous>  epoetin bunny Injectable 5000 Unit(s) SubCutaneous every 7 days  ferrous    sulfate 325 milliGRAM(s) Oral every 8 hours  furosemide   Injectable 80 milliGRAM(s) IV Push two times a day  heparin  Injectable 5000 Unit(s) SubCutaneous every 8 hours  influenza   Vaccine 0.5 milliLiter(s) IntraMuscular once  insulin glargine Injectable (LANTUS) 30 Unit(s) SubCutaneous at bedtime  insulin lispro (HumaLOG) corrective regimen sliding scale   SubCutaneous three times a day before meals  insulin lispro Injectable (HumaLOG) 12 Unit(s) SubCutaneous three times a day before meals  metolazone 2.5 milliGRAM(s) Oral <User Schedule>  metoprolol tartrate 50 milliGRAM(s) Oral two times a day  NIFEdipine XL 60 milliGRAM(s) Oral daily  pantoprazole    Tablet 40 milliGRAM(s) Oral before breakfast  sevelamer carbonate 800 milliGRAM(s) Oral three times a day with meals  simvastatin 40 milliGRAM(s) Oral at bedtime  sodium bicarbonate 650 milliGRAM(s) Oral every 12 hours        VITALS:  T(F): 97.3 (01-03-20 @ 05:46), Max: 97.3 (01-03-20 @ 05:46)  HR: 89 (01-03-20 @ 05:46)  BP: 164/81 (01-03-20 @ 05:46)  RR: 18 (01-03-20 @ 05:46)      01-01 @ 07:01  -  01-02 @ 07:00  --------------------------------------------------------  IN: 0 mL / OUT: 2550 mL / NET: -2550 mL    01-02 @ 07:01  -  01-03 @ 07:00  --------------------------------------------------------  IN: 0 mL / OUT: 2350 mL / NET: -2350 mL    01-03 @ 07:01 - 01-03 @ 08:56  --------------------------------------------------------  IN: 0 mL / OUT: 790 mL / NET: -790 mL          PHYSICAL EXAM:  Constitutional: NAD  HEENT: anicteric sclera, oropharynx clear, MMM  Neck: No JVD  Respiratory: CTAB, no wheezes, rales or rhonchi  Cardiovascular: S1, S2, RRR  Gastrointestinal: BS+, soft, NT/ND  Extremities: No cyanosis or clubbing. No peripheral edema  :  No schneider.   Skin: No rashes    LABS:  01-03    140  |  98  |  80<HH>  ----------------------------<  73  4.7   |  20  |  6.9<HH>    Ca    8.2<L>      03 Jan 2020 05:38  Phos  5.6     01-03    TPro  6.3  /  Alb  3.5  /  TBili  <0.2  /  DBili      /  AST  16  /  ALT  10  /  AlkPhos  100  01-03                          8.9    9.16  )-----------( 230      ( 03 Jan 2020 05:38 )             27.4       Urine Studies:      RADIOLOGY & ADDITIONAL STUDIES: Nephrology progress note  Patient is seen and examined, events over the last 24 h noted .  denied any chest pain no SOB   No other complaints     Allergies:  No Known Allergies    Hospital Medications:   MEDICATIONS  (STANDING):  aspirin enteric coated 81 milliGRAM(s) Oral daily  calcitriol   Capsule 0.25 MICROGram(s) Oral daily  calcium acetate 1334 milliGRAM(s) Oral every 8 hours  calcium carbonate    500 mG (Tums) Chewable 2 Tablet(s) Chew every 8 hours  clopidogrel Tablet 75 milliGRAM(s) Oral daily  dextrose 5%. 1000 milliLiter(s) (50 mL/Hr) IV Continuous <Continuous>  epoetin bunny Injectable 5000 Unit(s) SubCutaneous every 7 days  ferrous    sulfate 325 milliGRAM(s) Oral every 8 hours  furosemide   Injectable 80 milliGRAM(s) IV Push two times a day  heparin  Injectable 5000 Unit(s) SubCutaneous every 8 hours  insulin glargine Injectable (LANTUS) 30 Unit(s) SubCutaneous at bedtime  insulin lispro (HumaLOG) corrective regimen sliding scale   SubCutaneous three times a day before meals  insulin lispro Injectable (HumaLOG) 12 Unit(s) SubCutaneous three times a day before meals  metolazone 2.5 milliGRAM(s) Oral <User Schedule>  metoprolol tartrate 50 milliGRAM(s) Oral two times a day  NIFEdipine XL 60 milliGRAM(s) Oral daily  pantoprazole    Tablet 40 milliGRAM(s) Oral before breakfast  sevelamer carbonate 800 milliGRAM(s) Oral three times a day with meals  simvastatin 40 milliGRAM(s) Oral at bedtime  sodium bicarbonate 650 milliGRAM(s) Oral every 12 hours        VITALS:  T(F): 97.3 (01-03-20 @ 05:46), Max: 97.3 (01-03-20 @ 05:46)  HR: 89 (01-03-20 @ 05:46)  BP: 164/81 (01-03-20 @ 05:46)  RR: 18 (01-03-20 @ 05:46)      01-01 @ 07:01  -  01-02 @ 07:00  --------------------------------------------------------  IN: 0 mL / OUT: 2550 mL / NET: -2550 mL    01-02 @ 07:01  -  01-03 @ 07:00  --------------------------------------------------------  IN: 0 mL / OUT: 2350 mL / NET: -2350 mL    01-03 @ 07:01 - 01-03 @ 08:56  --------------------------------------------------------  IN: 0 mL / OUT: 790 mL / NET: -790 mL          PHYSICAL EXAM:  Constitutional: NAD  HEENT: anicteric sclera, oropharynx clear, MMM  Neck: No JVD  Respiratory: CTAB, no wheezes, rales or rhonchi  Cardiovascular: S1, S2, RRR  Gastrointestinal: BS+, soft, NT/ND  Extremities: No cyanosis or clubbing. plus one edema   :  No schneider.   Skin: No rashes    LABS:  01-03    140  |  98  |  80<HH>  ----------------------------<  73  4.7   |  20  |  6.9<HH>    Creatinine Trend: 6.9<--, 6.9<--, 5.9<--, 6.4<--, 5.6<--, 5.9<--    Ca    8.2<L>      03 Jan 2020 05:38  Phos  5.6     01-03    TPro  6.3  /  Alb  3.5  /  TBili  <0.2  /  DBili      /  AST  16  /  ALT  10  /  AlkPhos  100  01-03                          8.9    9.16  )-----------( 230      ( 03 Jan 2020 05:38 )             27.4       Urine Studies:      RADIOLOGY & ADDITIONAL STUDIES:

## 2020-01-03 NOTE — DIETITIAN INITIAL EVALUATION ADULT. - CONTINUE CURRENT NUTRITION CARE PLAN
Continue DASH/TLC, renal restr, no red dye, consistent carb diet add evening snack. Fluid restr per LIP./yes

## 2020-01-03 NOTE — PROGRESS NOTE ADULT - SUBJECTIVE AND OBJECTIVE BOX
Hospital Day:  7d    Subjective:    Patient is a 59y old Male who presents with a chief complaint of Dyspnea on exertion (03 Jan 2020 08:55)  This morning patient is sitting up comfortably in bed. He reports he stills has orthopnea and coughing fits when he lays down and sleeps sitting up. Otherwise he denies any new complaints.    Past Medical Hx:   Chronic anemia  [place] (hard of hearing)  OA (osteoarthritis)  Pain in left knee  BPH (benign prostatic hyperplasia)  Carotid artery disease  CAD (coronary artery disease)  HLD (hyperlipidemia)  PAD (peripheral artery disease)  Myocardial infarction  Stented coronary artery  Hypertension  Chronic kidney disease (CKD)  Transient ischemic attack (TIA)  Diabetes mellitus  Stroke  High cholesterol  S/P CABG (coronary artery bypass graft)  CAD (coronary artery disease)  Diabetes    Past Sx:  History of surgery  H/O arterial bypass of lower limb  S/P CABG (coronary artery bypass graft)    Allergies:  No Known Allergies    Current Meds:   Stand Meds:  aspirin enteric coated 81 milliGRAM(s) Oral daily  calcium acetate 1334 milliGRAM(s) Oral every 8 hours  calcium carbonate    500 mG (Tums) Chewable 2 Tablet(s) Chew every 8 hours  chlorhexidine 4% Liquid 1 Application(s) Topical <User Schedule>  clopidogrel Tablet 75 milliGRAM(s) Oral daily  darbepoetin Injectable Syringe 20 MICROGram(s) IV Push <User Schedule>  dextrose 5%. 1000 milliLiter(s) (50 mL/Hr) IV Continuous <Continuous>  dextrose 50% Injectable 12.5 Gram(s) IV Push once  dextrose 50% Injectable 25 Gram(s) IV Push once  dextrose 50% Injectable 25 Gram(s) IV Push once  doxercalciferol Injectable 2 MICROGram(s) IV Push <User Schedule>  ferrous    sulfate 325 milliGRAM(s) Oral every 8 hours  furosemide   Injectable 80 milliGRAM(s) IV Push two times a day  heparin  Injectable 5000 Unit(s) SubCutaneous every 8 hours  influenza   Vaccine 0.5 milliLiter(s) IntraMuscular once  insulin glargine Injectable (LANTUS) 30 Unit(s) SubCutaneous at bedtime  insulin lispro (HumaLOG) corrective regimen sliding scale   SubCutaneous three times a day before meals  insulin lispro Injectable (HumaLOG) 12 Unit(s) SubCutaneous three times a day before meals  metolazone 2.5 milliGRAM(s) Oral <User Schedule>  metoprolol tartrate 50 milliGRAM(s) Oral two times a day  NIFEdipine XL 60 milliGRAM(s) Oral daily  pantoprazole    Tablet 40 milliGRAM(s) Oral before breakfast  sevelamer carbonate 800 milliGRAM(s) Oral three times a day with meals  simvastatin 40 milliGRAM(s) Oral at bedtime  sodium bicarbonate 650 milliGRAM(s) Oral every 12 hours    PRN Meds:  acetaminophen   Tablet .. 650 milliGRAM(s) Oral every 6 hours PRN Mild Pain (1 - 3)  dextrose 40% Gel 15 Gram(s) Oral once PRN Blood Glucose LESS THAN 70 milliGRAM(s)/deciliter  glucagon  Injectable 1 milliGRAM(s) IntraMuscular once PRN Glucose LESS THAN 70 milligrams/deciliter  melatonin 10 milliGRAM(s) Oral at bedtime PRN Sleep  polyethylene glycol 3350 17 Gram(s) Oral daily PRN Constipation    HOME MEDICATIONS:  aspirin 81 mg oral tablet: 1 tab(s) orally once a day  Bydureon Pen 2 mg subcutaneous injection, extended release: 2 milligram(s) subcutaneous once a week  Levemir 100 units/mL subcutaneous solution: 40 unit(s) subcutaneous once a day (at bedtime). 20 units 10/23/19  Metoprolol Tartrate 50 mg oral tablet: 1 tab(s) orally 2 times a day  Plavix 75 mg oral tablet: 1 tab(s) orally once a day  simvastatin 40 mg oral tablet: 1 tab(s) orally once a day (at bedtime)      Vital Signs:   T(F): 97.3 (01-03-20 @ 05:46), Max: 97.3 (01-03-20 @ 05:46)  HR: 84 (01-03-20 @ 09:25) (80 - 89)  BP: 138/77 (01-03-20 @ 09:25) (138/77 - 164/81)  RR: 20 (01-03-20 @ 09:25) (18 - 20)  SpO2: --      01-02-20 @ 07:01  -  01-03-20 @ 07:00  --------------------------------------------------------  IN: 0 mL / OUT: 2350 mL / NET: -2350 mL    01-03-20 @ 07:01  -  01-03-20 @ 10:24  --------------------------------------------------------  IN: 0 mL / OUT: 790 mL / NET: -790 mL        Physical Exam:   GENERAL: NAD  HEENT: NCAT  CHEST/LUNG: CTAB  HEART: Regular rate and rhythm; s1 s2 appreciated, No murmurs, rubs, or gallops  ABDOMEN: Soft, Nontender, Nondistended; Bowel sounds present  EXTREMITIES: No LE edema b/l  NERVOUS SYSTEM:  Alert & Oriented X3        Labs:                         8.9    9.16  )-----------( 230      ( 03 Jan 2020 05:38 )             27.4     Neutophil% 69.8, Lymphocyte% 17.7, Monocyte% 7.8, Bands% 0.4 01-03-20 @ 05:38    03 Jan 2020 05:38    140    |  98     |  80     ----------------------------<  73     4.7     |  20     |  6.9      Ca    8.2        03 Jan 2020 05:38  Phos  5.6       03 Jan 2020 05:38    TPro  6.3    /  Alb  3.5    /  TBili  <0.2   /  DBili  x      /  AST  16     /  ALT  10     /  AlkPhos  100    03 Jan 2020 05:38          Hemoglobin A1C, Whole Blood: 6.2 % (12-28-19 @ 05:38)    Serum Pro-Brain Natriuretic Peptide: 62648 pg/mL (12-27-19 @ 09:39)                Assessment and Plan:     59 year old male with history of chronic kidney disease IV not on hemodialysis, Coronary Artery Disease status post CABG, Diabetes Mellitus II, PVD presenting with shortness of breath for 1 week.     #SOB secondary to fluid overload secondary to CKD 5 and now requiring HD  - Patient not clinically improving on diuretics with uptrending Cr  - BNP 12,000 with bilateral effusions on Chest X-Ray and CT  - echo shows EF 51% with grade 2 diastolic dysfunction  - Caution with fluids, 1L fluid restrict, lasix, metalozone  - Cardio recs appreciated  - As per nephro: For first HD today 1/3/2020    #CAD status post CABG  -Continue home ASA, Plavix, simvastatin    #PVD  -LLE edema caused by venous insufficiency worse in L than R  -duplex neg     #hyperphosphatemia   - sevelamir, calcium carbonate, phoslo    #anemia check iron studies   - Iron/TIBC/ferritin WNL  - Begin iron sulfate and procrit as per nephro    #elevated troponins likely demand in the setting of CKD5   - has history if CABG   - on asa and plavix   - no further intervention per cardiology   - echo showed normal EF     #DM  -Basal/bolus  -A1c 6.2    #HTN  -Continue home metoprolol    #Elevated Alk Phos  -132 on admission, previously normal  -Monitor    #Prolonged QT improved down to 446 from 490s   -Monitor QT  -Avoid QT-prolonging agents      #DVT PPX: subcutaneous heparin  #GI PPX: Protonix  #Diet: DASH, CC, renal, 1L fluid restrict  #Activity: As tolerated  #Dispo: Back to home; pending nephro assessment post-HD  #CODE: FULL

## 2020-01-03 NOTE — PROGRESS NOTE ADULT - ASSESSMENT
Pt with CKD stage 5,  started on HD due to SOB   Infiltrated so HD held since Monday  Stil ledematous in spite of lasix + metolazone AVF in place in anticipation of HD, DM, HTN, CAD, CHF presents with worsening SOB for 1 week. Admits to have ran out of lasix. No CP.    CTA neg for PE, b/l pleural effusions.    # CKD 5 - with fluid overload:  # for HD today 3h opti 160 2k UF 1l as tolerated / AVF   # cont  lasix to 80 iv q 12 / metolazone still non oliguric   #DC sodium bicarbonate   # ph noted, on renagel, phoslo ,, pth noted, cont binders / will switch calcitriol to hectorol on HD   #BP not well controlled, volume related, diuretics adjusted   #h/h noted, start feso4 po q 8, and procrit 5000 units s/c weekly / will switch procrit to IV aranesp on HD weekly   # will follow Pt with CKD stage 5,  started on HD due to SOB   Infiltrated so HD held since Monday  Stil ledematous in spite of lasix + metolazone AVF in place in anticipation of HD, DM, HTN, CAD, CHF presents with worsening SOB for 1 week. Admits to have ran out of lasix. No CP.    CTA neg for PE, b/l pleural effusions.    # CKD 5 - with fluid overload:  # for HD today 3h opti 160 2k UF 1l as tolerated / AVF   # [address]   # cont  lasix switch to lasix 80 po q12h / metolazone still non oliguric   #DC sodium bicarbonate on HD now   # ph noted, on renagel, phoslo , pth noted, cont binders / will switch calcitriol to hectorol on HD   #BP not well controlled, volume related, diuretics adjusted / will follow post HD and adjust meds   #h/h noted, start feso4 po q 8, and procrit 5000 units s/c weekly / will switch procrit to IV aranesp on HD weekly   # will follow

## 2020-01-03 NOTE — PROGRESS NOTE ADULT - SUBJECTIVE AND OBJECTIVE BOX
no chest pain   no sob   HD today per renal     Vital Signs Last 24 Hrs  T(C): 36.3 (03 Jan 2020 05:46), Max: 36.3 (03 Jan 2020 05:46)  T(F): 97.3 (03 Jan 2020 05:46), Max: 97.3 (03 Jan 2020 05:46)  HR: 84 (03 Jan 2020 09:25) (80 - 89)  BP: 138/77 (03 Jan 2020 09:25) (138/77 - 164/81)  BP(mean): --  RR: 20 (03 Jan 2020 09:25) (18 - 20)  SpO2: --    PHYSICAL EXAM:  GENERAL: NAD,   Pulm: Clear to auscultation bilaterally; No wheeze  CV:: Regular rate and rhythm; No murmurs, rubs, or gallops  GI: Soft, Nontender, Nondistended; Bowel sounds present  EXTREMITIES:  ++edema  PSYCH: AAOx3  NEUROLOGY: non-focal  SKIN: No rashes or lesions                          8.9    9.16  )-----------( 230      ( 03 Jan 2020 05:38 )             27.4     01-03    140  |  98  |  80<HH>  ----------------------------<  73  4.7   |  20  |  6.9<HH>    Ca    8.2<L>      03 Jan 2020 05:38  Phos  5.6     01-03    TPro  6.3  /  Alb  3.5  /  TBili  <0.2  /  DBili  x   /  AST  16  /  ALT  10  /  AlkPhos  100  01-03    LIVER FUNCTIONS - ( 03 Jan 2020 05:38 )  Alb: 3.5 g/dL / Pro: 6.3 g/dL / ALK PHOS: 100 U/L / ALT: 10 U/L / AST: 16 U/L / GGT: x                 MEDICATIONS  (STANDING):  aspirin enteric coated 81 milliGRAM(s) Oral daily  calcium acetate 1334 milliGRAM(s) Oral every 8 hours  calcium carbonate    500 mG (Tums) Chewable 2 Tablet(s) Chew every 8 hours  chlorhexidine 4% Liquid 1 Application(s) Topical <User Schedule>  clopidogrel Tablet 75 milliGRAM(s) Oral daily  darbepoetin Injectable Syringe 20 MICROGram(s) IV Push <User Schedule>  dextrose 5%. 1000 milliLiter(s) (50 mL/Hr) IV Continuous <Continuous>  dextrose 50% Injectable 12.5 Gram(s) IV Push once  dextrose 50% Injectable 25 Gram(s) IV Push once  dextrose 50% Injectable 25 Gram(s) IV Push once  doxercalciferol Injectable 2 MICROGram(s) IV Push <User Schedule>  ferrous    sulfate 325 milliGRAM(s) Oral every 8 hours  furosemide    Tablet 80 milliGRAM(s) Oral every 12 hours  heparin  Injectable 5000 Unit(s) SubCutaneous every 8 hours  influenza   Vaccine 0.5 milliLiter(s) IntraMuscular once  insulin glargine Injectable (LANTUS) 30 Unit(s) SubCutaneous at bedtime  insulin lispro (HumaLOG) corrective regimen sliding scale   SubCutaneous three times a day before meals  insulin lispro Injectable (HumaLOG) 12 Unit(s) SubCutaneous three times a day before meals  metolazone 2.5 milliGRAM(s) Oral <User Schedule>  metoprolol tartrate 50 milliGRAM(s) Oral two times a day  NIFEdipine XL 60 milliGRAM(s) Oral daily  pantoprazole    Tablet 40 milliGRAM(s) Oral before breakfast  sevelamer carbonate 800 milliGRAM(s) Oral three times a day with meals  simvastatin 40 milliGRAM(s) Oral at bedtime  sodium bicarbonate 650 milliGRAM(s) Oral every 12 hours    MEDICATIONS  (PRN):  acetaminophen   Tablet .. 650 milliGRAM(s) Oral every 6 hours PRN Mild Pain (1 - 3)  dextrose 40% Gel 15 Gram(s) Oral once PRN Blood Glucose LESS THAN 70 milliGRAM(s)/deciliter  glucagon  Injectable 1 milliGRAM(s) IntraMuscular once PRN Glucose LESS THAN 70 milligrams/deciliter  melatonin 10 milliGRAM(s) Oral at bedtime PRN Sleep  polyethylene glycol 3350 17 Gram(s) Oral daily PRN Constipation

## 2020-01-03 NOTE — DIETITIAN INITIAL EVALUATION ADULT. - RD TO REMAIN AVAILABLE
yes/RD to monitor energy intake, body composition, NFPF, electrolyte/renal profile, glucose profile. Goal: In 7 days pt. to continue to consume 100% PO at meals

## 2020-01-03 NOTE — PROGRESS NOTE ADULT - ASSESSMENT
fluid overload secondary to CKD 5 did not respond well to IV diuretics and renal decided to start HD today through AVF   on po lasix 80 BID   metolazone 2,5 q48   HD today   hyperphosphatemia on binders renagel and phoslow was increased today   d/c bicarbonate per renal     Anemia stable on iron and darbepoetin weekly     hypocalcemia and hyperparathyroid:   d/c calcitriol and started hectrol per renal      HTN: better now monitor post HD and c/w current meds     DM: hypoglycemia better lantus was decreased to 30 qhs from 40     HLD on statin     CAD and PVD: c/w asa and plavix. history of CABG     DVT PPX hep subc .     #Progress Note Handoff  Pending (specify):  started on HD today for first time. renal to clear and HD spot as OPT   Family discussion: patient   Disposition: Home

## 2020-01-03 NOTE — DIETITIAN INITIAL EVALUATION ADULT. - DIET TYPE
Pt. reports good appetite PTP, follows diabetic diet. NKFA. Cranberry supplement daily.  Stable weight trends. No cultural/Anglican dietary restr.

## 2020-01-04 ENCOUNTER — TRANSCRIPTION ENCOUNTER (OUTPATIENT)
Age: 60
End: 2020-01-04

## 2020-01-04 VITALS
TEMPERATURE: 97 F | DIASTOLIC BLOOD PRESSURE: 55 MMHG | RESPIRATION RATE: 18 BRPM | HEART RATE: 79 BPM | SYSTOLIC BLOOD PRESSURE: 109 MMHG

## 2020-01-04 LAB
ALBUMIN SERPL ELPH-MCNC: 3.5 G/DL — SIGNIFICANT CHANGE UP (ref 3.5–5.2)
ALP SERPL-CCNC: 95 U/L — SIGNIFICANT CHANGE UP (ref 30–115)
ALT FLD-CCNC: 10 U/L — SIGNIFICANT CHANGE UP (ref 0–41)
ANION GAP SERPL CALC-SCNC: 18 MMOL/L — HIGH (ref 7–14)
AST SERPL-CCNC: 17 U/L — SIGNIFICANT CHANGE UP (ref 0–41)
BASOPHILS # BLD AUTO: 0.11 K/UL — SIGNIFICANT CHANGE UP (ref 0–0.2)
BASOPHILS NFR BLD AUTO: 1.1 % — HIGH (ref 0–1)
BILIRUB SERPL-MCNC: <0.2 MG/DL — SIGNIFICANT CHANGE UP (ref 0.2–1.2)
BUN SERPL-MCNC: 60 MG/DL — HIGH (ref 10–20)
CALCIUM SERPL-MCNC: 8.3 MG/DL — LOW (ref 8.5–10.1)
CHLORIDE SERPL-SCNC: 96 MMOL/L — LOW (ref 98–110)
CO2 SERPL-SCNC: 24 MMOL/L — SIGNIFICANT CHANGE UP (ref 17–32)
CREAT SERPL-MCNC: 5.6 MG/DL — CRITICAL HIGH (ref 0.7–1.5)
EOSINOPHIL # BLD AUTO: 0.28 K/UL — SIGNIFICANT CHANGE UP (ref 0–0.7)
EOSINOPHIL NFR BLD AUTO: 2.7 % — SIGNIFICANT CHANGE UP (ref 0–8)
GLUCOSE BLDC GLUCOMTR-MCNC: 122 MG/DL — HIGH (ref 70–99)
GLUCOSE BLDC GLUCOMTR-MCNC: 87 MG/DL — SIGNIFICANT CHANGE UP (ref 70–99)
GLUCOSE SERPL-MCNC: 86 MG/DL — SIGNIFICANT CHANGE UP (ref 70–99)
HCT VFR BLD CALC: 27.7 % — LOW (ref 42–52)
HGB BLD-MCNC: 9 G/DL — LOW (ref 14–18)
IMM GRANULOCYTES NFR BLD AUTO: 0.4 % — HIGH (ref 0.1–0.3)
LYMPHOCYTES # BLD AUTO: 1.89 K/UL — SIGNIFICANT CHANGE UP (ref 1.2–3.4)
LYMPHOCYTES # BLD AUTO: 18.4 % — LOW (ref 20.5–51.1)
MCHC RBC-ENTMCNC: 29.8 PG — SIGNIFICANT CHANGE UP (ref 27–31)
MCHC RBC-ENTMCNC: 32.5 G/DL — SIGNIFICANT CHANGE UP (ref 32–37)
MCV RBC AUTO: 91.7 FL — SIGNIFICANT CHANGE UP (ref 80–94)
MONOCYTES # BLD AUTO: 0.94 K/UL — HIGH (ref 0.1–0.6)
MONOCYTES NFR BLD AUTO: 9.1 % — SIGNIFICANT CHANGE UP (ref 1.7–9.3)
NEUTROPHILS # BLD AUTO: 7.03 K/UL — HIGH (ref 1.4–6.5)
NEUTROPHILS NFR BLD AUTO: 68.3 % — SIGNIFICANT CHANGE UP (ref 42.2–75.2)
NRBC # BLD: 0 /100 WBCS — SIGNIFICANT CHANGE UP (ref 0–0)
PHOSPHATE SERPL-MCNC: 4.4 MG/DL — SIGNIFICANT CHANGE UP (ref 2.1–4.9)
PLATELET # BLD AUTO: 193 K/UL — SIGNIFICANT CHANGE UP (ref 130–400)
POTASSIUM SERPL-MCNC: 4.2 MMOL/L — SIGNIFICANT CHANGE UP (ref 3.5–5)
POTASSIUM SERPL-SCNC: 4.2 MMOL/L — SIGNIFICANT CHANGE UP (ref 3.5–5)
PROT SERPL-MCNC: 6.4 G/DL — SIGNIFICANT CHANGE UP (ref 6–8)
RBC # BLD: 3.02 M/UL — LOW (ref 4.7–6.1)
RBC # FLD: 12.4 % — SIGNIFICANT CHANGE UP (ref 11.5–14.5)
SODIUM SERPL-SCNC: 138 MMOL/L — SIGNIFICANT CHANGE UP (ref 135–146)
WBC # BLD: 10.29 K/UL — SIGNIFICANT CHANGE UP (ref 4.8–10.8)
WBC # FLD AUTO: 10.29 K/UL — SIGNIFICANT CHANGE UP (ref 4.8–10.8)

## 2020-01-04 PROCEDURE — 71045 X-RAY EXAM CHEST 1 VIEW: CPT | Mod: 26

## 2020-01-04 PROCEDURE — 99239 HOSP IP/OBS DSCHRG MGMT >30: CPT

## 2020-01-04 RX ORDER — FUROSEMIDE 40 MG
1 TABLET ORAL
Qty: 60 | Refills: 0
Start: 2020-01-04 | End: 2020-02-02

## 2020-01-04 RX ORDER — CALCIUM ACETATE 667 MG
2 TABLET ORAL
Qty: 180 | Refills: 0
Start: 2020-01-04 | End: 2020-02-02

## 2020-01-04 RX ORDER — DOXERCALCIFEROL 2.5 UG/1
0.2 CAPSULE ORAL
Qty: 0 | Refills: 0 | DISCHARGE
Start: 2020-01-04

## 2020-01-04 RX ORDER — SEVELAMER CARBONATE 2400 MG/1
1 POWDER, FOR SUSPENSION ORAL
Qty: 90 | Refills: 0
Start: 2020-01-04 | End: 2020-02-02

## 2020-01-04 RX ORDER — CALCIUM CARBONATE 500(1250)
2 TABLET ORAL
Qty: 180 | Refills: 0
Start: 2020-01-04 | End: 2020-02-02

## 2020-01-04 RX ORDER — NIFEDIPINE 30 MG
1 TABLET, EXTENDED RELEASE 24 HR ORAL
Qty: 30 | Refills: 0
Start: 2020-01-04 | End: 2020-02-02

## 2020-01-04 RX ADMIN — Medication 1334 MILLIGRAM(S): at 00:26

## 2020-01-04 RX ADMIN — Medication 2 TABLET(S): at 06:09

## 2020-01-04 RX ADMIN — Medication 60 MILLIGRAM(S): at 06:09

## 2020-01-04 RX ADMIN — Medication 325 MILLIGRAM(S): at 06:08

## 2020-01-04 RX ADMIN — HEPARIN SODIUM 5000 UNIT(S): 5000 INJECTION INTRAVENOUS; SUBCUTANEOUS at 06:08

## 2020-01-04 RX ADMIN — PANTOPRAZOLE SODIUM 40 MILLIGRAM(S): 20 TABLET, DELAYED RELEASE ORAL at 06:08

## 2020-01-04 RX ADMIN — Medication 1334 MILLIGRAM(S): at 08:08

## 2020-01-04 RX ADMIN — Medication 81 MILLIGRAM(S): at 12:33

## 2020-01-04 RX ADMIN — Medication 80 MILLIGRAM(S): at 06:08

## 2020-01-04 RX ADMIN — Medication 50 MILLIGRAM(S): at 06:08

## 2020-01-04 RX ADMIN — SEVELAMER CARBONATE 800 MILLIGRAM(S): 2400 POWDER, FOR SUSPENSION ORAL at 12:33

## 2020-01-04 RX ADMIN — SEVELAMER CARBONATE 800 MILLIGRAM(S): 2400 POWDER, FOR SUSPENSION ORAL at 08:07

## 2020-01-04 RX ADMIN — CLOPIDOGREL BISULFATE 75 MILLIGRAM(S): 75 TABLET, FILM COATED ORAL at 12:33

## 2020-01-04 RX ADMIN — CHLORHEXIDINE GLUCONATE 1 APPLICATION(S): 213 SOLUTION TOPICAL at 06:07

## 2020-01-04 NOTE — DISCHARGE NOTE PROVIDER - CARE PROVIDER_API CALL
Orlando Sandoval)  [id number]  [address] [place] [address]  Phone: [phone]  Fax: [phone]  Follow Up Time: 1 week    Nancie Cazares)  Internal Medicine; Nephrology  [address] [place] [address]  Phone: [phone]  Fax: [phone]  Follow Up Time:

## 2020-01-04 NOTE — PROGRESS NOTE ADULT - SUBJECTIVE AND OBJECTIVE BOX
seen and examined  no distress  s/p hd yesterday         PAST HISTORY  --------------------------------------------------------------------------------  No significant changes to PMH, PSH, FHx, SHx, unless otherwise noted    ALLERGIES & MEDICATIONS  --------------------------------------------------------------------------------  Allergies    No Known Allergies          Standing Inpatient Medications  aspirin enteric coated 81 milliGRAM(s) Oral daily  calcium acetate 1334 milliGRAM(s) Oral every 8 hours  calcium carbonate    500 mG (Tums) Chewable 2 Tablet(s) Chew every 8 hours  chlorhexidine 4% Liquid 1 Application(s) Topical <User Schedule>  clopidogrel Tablet 75 milliGRAM(s) Oral daily  darbepoetin Injectable Syringe 20 MICROGram(s) IV Push <User Schedule>  dextrose 5%. 1000 milliLiter(s) IV Continuous <Continuous>  dextrose 50% Injectable 12.5 Gram(s) IV Push once  dextrose 50% Injectable 25 Gram(s) IV Push once  dextrose 50% Injectable 25 Gram(s) IV Push once  doxercalciferol Injectable 2 MICROGram(s) IV Push <User Schedule>  ferrous    sulfate 325 milliGRAM(s) Oral every 8 hours  furosemide    Tablet 80 milliGRAM(s) Oral every 12 hours  heparin  Injectable 5000 Unit(s) SubCutaneous every 8 hours  influenza   Vaccine 0.5 milliLiter(s) IntraMuscular once  insulin glargine Injectable (LANTUS) 30 Unit(s) SubCutaneous at bedtime  insulin lispro (HumaLOG) corrective regimen sliding scale   SubCutaneous three times a day before meals  insulin lispro Injectable (HumaLOG) 12 Unit(s) SubCutaneous three times a day before meals  metolazone 2.5 milliGRAM(s) Oral <User Schedule>  metoprolol tartrate 50 milliGRAM(s) Oral two times a day  NIFEdipine XL 60 milliGRAM(s) Oral daily  pantoprazole    Tablet 40 milliGRAM(s) Oral before breakfast  sevelamer carbonate 800 milliGRAM(s) Oral three times a day with meals  simvastatin 40 milliGRAM(s) Oral at bedtime    PRN Inpatient Medications  acetaminophen   Tablet .. 650 milliGRAM(s) Oral every 6 hours PRN  dextrose 40% Gel 15 Gram(s) Oral once PRN  glucagon  Injectable 1 milliGRAM(s) IntraMuscular once PRN  melatonin 10 milliGRAM(s) Oral at bedtime PRN  polyethylene glycol 3350 17 Gram(s) Oral daily PRN        VITALS/PHYSICAL EXAM  --------------------------------------------------------------------------------  T(C): 36.1 (01-04-20 @ 06:16), Max: 36.1 (01-04-20 @ 06:16)  HR: 94 (01-04-20 @ 06:16) (76 - 94)  BP: 165/79 (01-04-20 @ 06:16) (100/55 - 165/79)  RR: 18 (01-04-20 @ 06:16) (17 - 18)  SpO2: --  Wt(kg): --  Height (cm): 180.3 (01-03-20 @ 13:55)      01-03-20 @ 07:01  -  01-04-20 @ 07:00  --------------------------------------------------------  IN: 200 mL / OUT: 2290 mL / NET: -2090 mL      Physical Exam:  	Gen: NAD  	Pulm: decrease BS  B/L  	CV: S1S2; no rub  	Abd:  soft, nontender/nondistended  	LE: edema  	Vascular access: av fistula     LABS/STUDIES  --------------------------------------------------------------------------------              9.0    10.29 >-----------<  193      [01-04-20 @ 05:56]              27.7     138  |  96  |  60  ----------------------------<  86      [01-04-20 @ 05:56]  4.2   |  24  |  5.6        Ca     8.3     [01-04-20 @ 05:56]      Phos  4.4     [01-04-20 @ 05:56]    TPro  6.4  /  Alb  3.5  /  TBili  <0.2  /  DBili  x   /  AST  17  /  ALT  10  /  AlkPhos  95  [01-04-20 @ 05:56]          Creatinine Trend:  SCr 5.6 [01-04 @ 05:56]  SCr 6.9 [01-03 @ 05:38]  SCr 6.9 [01-02 @ 05:35]  SCr 5.9 [01-01 @ 05:50]  SCr 6.4 [12-31 @ 06:45]        Iron 59, TIBC 225, %sat 26      [12-31-19 @ 06:45]  Ferritin 113      [12-31-19 @ 06:45]  PTH -- (Ca 7.4)      [12-29-19 @ 17:44]   224  HbA1c 6.2      [12-28-19 @ 05:38]  TSH 1.49      [12-28-19 @ 05:38]    HBsAb <3.0      [12-29-19 @ 17:44]  HBsAb Nonreact      [12-29-19 @ 17:44]  HBsAg Nonreact      [12-29-19 @ 17:44]  HBcAb Nonreact      [12-29-19 @ 17:44]  HCV 0.10, Nonreact      [12-29-19 @ 17:44]

## 2020-01-04 NOTE — DISCHARGE NOTE NURSING/CASE MANAGEMENT/SOCIAL WORK - PATIENT PORTAL LINK FT
You can access the FollowMyHealth Patient Portal offered by [facility] by registering at the following website: [email or web address]. By joining [facility]’s FollowMyHealth portal, you will also be able to view your health information using other applications (apps) compatible with our system.

## 2020-01-04 NOTE — DISCHARGE NOTE PROVIDER - NSDCCPCAREPLAN_GEN_ALL_CORE_FT
PRINCIPAL DISCHARGE DIAGNOSIS  Diagnosis: Dyspnea on exertion  Assessment and Plan of Treatment: CHF exacerbation, continue with medcations      SECONDARY DISCHARGE DIAGNOSES  Diagnosis: Elevated brain natriuretic peptide (BNP) level  Assessment and Plan of Treatment:     Diagnosis: Fluid overload  Assessment and Plan of Treatment:     Diagnosis: Elevated troponin  Assessment and Plan of Treatment:

## 2020-01-04 NOTE — PROGRESS NOTE ADULT - REASON FOR ADMISSION
Dyspnea on exertion

## 2020-01-04 NOTE — PROGRESS NOTE ADULT - PROVIDER SPECIALTY LIST ADULT
Hospitalist
Internal Medicine
Nephrology
Hospitalist

## 2020-01-04 NOTE — DISCHARGE NOTE PROVIDER - NSDCMRMEDTOKEN_GEN_ALL_CORE_FT
aspirin 81 mg oral tablet: 1 tab(s) orally once a day  Bydureon Pen 2 mg subcutaneous injection, extended release: 2 milligram(s) subcutaneous once a week  calcium acetate 667 mg oral tablet: 2 tab(s) orally every 8 hours   calcium carbonate 500 mg (200 mg elemental calcium) oral tablet, chewable: 2 tab(s) orally every 8 hours  doxercalciferol 2 mcg/mL injectable solution: 0.2 microgram(s) injectable 3 times a week Mon, wed and Friday  furosemide 80 mg oral tablet: 1 tab(s) orally every 12 hours  Levemir 100 units/mL subcutaneous solution: 40 unit(s) subcutaneous once a day (at bedtime). 20 units 10/23/19  metOLazone 2.5 mg oral tablet: 1 tab(s) orally every 48 hours   Metoprolol Tartrate 50 mg oral tablet: 1 tab(s) orally 2 times a day  NIFEdipine 60 mg oral tablet, extended release: 1 tab(s) orally once a day  Plavix 75 mg oral tablet: 1 tab(s) orally once a day  sevelamer carbonate 800 mg oral tablet: 1 tab(s) orally 3 times a day (with meals)  simvastatin 40 mg oral tablet: 1 tab(s) orally once a day (at bedtime)

## 2020-01-04 NOTE — PROGRESS NOTE ADULT - ASSESSMENT
Pt with CKD stage 5,  started on HD due to SOB   Infiltrated so HD held since Monday  Stil ledematous in spite of lasix + metolazone AVF in place in anticipation of HD, DM, HTN, CAD, CHF presents with worsening SOB for 1 week. Admits to have ran out of lasix. No CP.    CTA neg for PE, b/l pleural effusions.    # CKD 5 - with fluid overload:  # s/p hd yesterday, no need for hd today   [facility]   # cont  lasix 80 po q12h / metolazone still non oliguric    # ph noted, on renagel, phoslo , pth noted, cont binders / will switch calcitriol to hectorol on HD   #BP not well controlled, if remains elevated, increase nifedipine to 90   #h/h noted, will start venofer and darbe with hd   # will follow

## 2020-01-04 NOTE — DISCHARGE NOTE PROVIDER - HOSPITAL COURSE
A 60 yo male with PMH of chronic kidney disease IV not on hemodialysis, Coronary Artery Disease status post CABG, Diabetes Mellitus II, PVD presenting with shortness of breath for 1 week with orthopnea, and decreased activity due to SOB. In the ED he was noted with LE edema, D-dimer elevated, Chest CT angio negative for PE. Pro-BNP elevated, CXR showed vascular congestion with pleural effusion. He was admitted to the hospital for CHF exacerbation dn fluid overload. He started on Lasix IV with minimal improvement. His cr was increasing. Patient had left arm AVF which was placed in prepare for HD, nephrology decided to start on HD , symptoms improved after HD, patient will be discharged home and follow up with renal for HD.         Acute HFpEF:     Echo showed LVEF 51%,     on po lasix 80 BID     metolazone 2,5 q48     HD today         CKD stage 5: now ESRD    hyperphosphatemia on binders renagel and phoslow was increased today     d/c bicarbonate per renal         Anemia stable on iron and darbepoetin weekly         hypocalcemia and hyperparathyroid:     d/c calcitriol and started hectrol per renal          HTN: better now monitor post HD and c/w current meds         DM: hypoglycemia better lantus was decreased to 30 qhs from 40         HLD on statin         CAD and PVD: c/w asa and plavix. history of CABG

## 2020-01-08 ENCOUNTER — APPOINTMENT (OUTPATIENT)
Dept: VASCULAR SURGERY | Facility: HOSPITAL | Age: 60
End: 2020-01-08

## 2020-01-09 DIAGNOSIS — Z95.1 PRESENCE OF AORTOCORONARY BYPASS GRAFT: ICD-10-CM

## 2020-01-09 DIAGNOSIS — E87.2 ACIDOSIS: ICD-10-CM

## 2020-01-09 DIAGNOSIS — E87.5 HYPERKALEMIA: ICD-10-CM

## 2020-01-09 DIAGNOSIS — M19.90 UNSPECIFIED OSTEOARTHRITIS, UNSPECIFIED SITE: ICD-10-CM

## 2020-01-09 DIAGNOSIS — I82.461 ACUTE EMBOLISM AND THROMBOSIS OF RIGHT CALF MUSCULAR VEIN: ICD-10-CM

## 2020-01-09 DIAGNOSIS — E21.3 HYPERPARATHYROIDISM, UNSPECIFIED: ICD-10-CM

## 2020-01-09 DIAGNOSIS — Z89.422 ACQUIRED ABSENCE OF OTHER LEFT TOE(S): ICD-10-CM

## 2020-01-09 DIAGNOSIS — E11.649 TYPE 2 DIABETES MELLITUS WITH HYPOGLYCEMIA WITHOUT COMA: ICD-10-CM

## 2020-01-09 DIAGNOSIS — D63.1 ANEMIA IN CHRONIC KIDNEY DISEASE: ICD-10-CM

## 2020-01-09 DIAGNOSIS — Z91.14 PATIENT'S OTHER NONCOMPLIANCE WITH MEDICATION REGIMEN: ICD-10-CM

## 2020-01-09 DIAGNOSIS — Z79.82 LONG TERM (CURRENT) USE OF ASPIRIN: ICD-10-CM

## 2020-01-09 DIAGNOSIS — Z86.73 PERSONAL HISTORY OF TRANSIENT ISCHEMIC ATTACK (TIA), AND CEREBRAL INFARCTION WITHOUT RESIDUAL DEFICITS: ICD-10-CM

## 2020-01-09 DIAGNOSIS — R94.31 ABNORMAL ELECTROCARDIOGRAM [ECG] [EKG]: ICD-10-CM

## 2020-01-09 DIAGNOSIS — N18.6 END STAGE RENAL DISEASE: ICD-10-CM

## 2020-01-09 DIAGNOSIS — E83.51 HYPOCALCEMIA: ICD-10-CM

## 2020-01-09 DIAGNOSIS — I25.2 OLD MYOCARDIAL INFARCTION: ICD-10-CM

## 2020-01-09 DIAGNOSIS — E11.51 TYPE 2 DIABETES MELLITUS WITH DIABETIC PERIPHERAL ANGIOPATHY WITHOUT GANGRENE: ICD-10-CM

## 2020-01-09 DIAGNOSIS — I50.31 ACUTE DIASTOLIC (CONGESTIVE) HEART FAILURE: ICD-10-CM

## 2020-01-09 DIAGNOSIS — E11.22 TYPE 2 DIABETES MELLITUS WITH DIABETIC CHRONIC KIDNEY DISEASE: ICD-10-CM

## 2020-01-09 DIAGNOSIS — I25.10 ATHEROSCLEROTIC HEART DISEASE OF NATIVE CORONARY ARTERY WITHOUT ANGINA PECTORIS: ICD-10-CM

## 2020-01-09 DIAGNOSIS — E78.5 HYPERLIPIDEMIA, UNSPECIFIED: ICD-10-CM

## 2020-01-09 DIAGNOSIS — N40.0 BENIGN PROSTATIC HYPERPLASIA WITHOUT LOWER URINARY TRACT SYMPTOMS: ICD-10-CM

## 2020-01-09 DIAGNOSIS — R06.02 SHORTNESS OF BREATH: ICD-10-CM

## 2020-01-09 DIAGNOSIS — I13.2 HYPERTENSIVE HEART AND CHRONIC KIDNEY DISEASE WITH HEART FAILURE AND WITH STAGE 5 CHRONIC KIDNEY DISEASE, OR END STAGE RENAL DISEASE: ICD-10-CM

## 2020-01-09 DIAGNOSIS — E83.39 OTHER DISORDERS OF PHOSPHORUS METABOLISM: ICD-10-CM

## 2020-01-28 ENCOUNTER — INPATIENT (INPATIENT)
Facility: HOSPITAL | Age: 60
LOS: 2 days | Discharge: HOME | End: 2020-01-31
Attending: HOSPITALIST | Admitting: HOSPITALIST
Payer: COMMERCIAL

## 2020-01-28 VITALS
OXYGEN SATURATION: 94 % | RESPIRATION RATE: 18 BRPM | HEART RATE: 72 BPM | DIASTOLIC BLOOD PRESSURE: 52 MMHG | TEMPERATURE: 97 F | SYSTOLIC BLOOD PRESSURE: 94 MMHG

## 2020-01-28 DIAGNOSIS — Z95.828 PRESENCE OF OTHER VASCULAR IMPLANTS AND GRAFTS: Chronic | ICD-10-CM

## 2020-01-28 DIAGNOSIS — Z95.1 PRESENCE OF AORTOCORONARY BYPASS GRAFT: Chronic | ICD-10-CM

## 2020-01-28 DIAGNOSIS — Z98.890 OTHER SPECIFIED POSTPROCEDURAL STATES: Chronic | ICD-10-CM

## 2020-01-28 LAB
ALBUMIN SERPL ELPH-MCNC: 4.6 G/DL — SIGNIFICANT CHANGE UP (ref 3.5–5.2)
ALP SERPL-CCNC: 111 U/L — SIGNIFICANT CHANGE UP (ref 30–115)
ALT FLD-CCNC: 7 U/L — SIGNIFICANT CHANGE UP (ref 0–41)
ANION GAP SERPL CALC-SCNC: 16 MMOL/L — HIGH (ref 7–14)
AST SERPL-CCNC: 13 U/L — SIGNIFICANT CHANGE UP (ref 0–41)
BASOPHILS # BLD AUTO: 0.09 K/UL — SIGNIFICANT CHANGE UP (ref 0–0.2)
BASOPHILS NFR BLD AUTO: 0.8 % — SIGNIFICANT CHANGE UP (ref 0–1)
BILIRUB SERPL-MCNC: <0.2 MG/DL — SIGNIFICANT CHANGE UP (ref 0.2–1.2)
BUN SERPL-MCNC: 34 MG/DL — HIGH (ref 10–20)
CALCIUM SERPL-MCNC: 8.6 MG/DL — SIGNIFICANT CHANGE UP (ref 8.5–10.1)
CHLORIDE SERPL-SCNC: 93 MMOL/L — LOW (ref 98–110)
CO2 SERPL-SCNC: 26 MMOL/L — SIGNIFICANT CHANGE UP (ref 17–32)
CREAT SERPL-MCNC: 4 MG/DL — HIGH (ref 0.7–1.5)
EOSINOPHIL # BLD AUTO: 0.07 K/UL — SIGNIFICANT CHANGE UP (ref 0–0.7)
EOSINOPHIL NFR BLD AUTO: 0.6 % — SIGNIFICANT CHANGE UP (ref 0–8)
GLUCOSE BLDC GLUCOMTR-MCNC: 215 MG/DL — HIGH (ref 70–99)
GLUCOSE SERPL-MCNC: 143 MG/DL — HIGH (ref 70–99)
HCT VFR BLD CALC: 28.1 % — LOW (ref 42–52)
HGB BLD-MCNC: 9.2 G/DL — LOW (ref 14–18)
IMM GRANULOCYTES NFR BLD AUTO: 0.6 % — HIGH (ref 0.1–0.3)
LACTATE SERPL-SCNC: 1 MMOL/L — SIGNIFICANT CHANGE UP (ref 0.7–2)
LIDOCAIN IGE QN: 83 U/L — HIGH (ref 7–60)
LYMPHOCYTES # BLD AUTO: 1.04 K/UL — LOW (ref 1.2–3.4)
LYMPHOCYTES # BLD AUTO: 9.6 % — LOW (ref 20.5–51.1)
MCHC RBC-ENTMCNC: 30.8 PG — SIGNIFICANT CHANGE UP (ref 27–31)
MCHC RBC-ENTMCNC: 32.7 G/DL — SIGNIFICANT CHANGE UP (ref 32–37)
MCV RBC AUTO: 94 FL — SIGNIFICANT CHANGE UP (ref 80–94)
MONOCYTES # BLD AUTO: 0.31 K/UL — SIGNIFICANT CHANGE UP (ref 0.1–0.6)
MONOCYTES NFR BLD AUTO: 2.9 % — SIGNIFICANT CHANGE UP (ref 1.7–9.3)
NEUTROPHILS # BLD AUTO: 9.29 K/UL — HIGH (ref 1.4–6.5)
NEUTROPHILS NFR BLD AUTO: 85.5 % — HIGH (ref 42.2–75.2)
NRBC # BLD: 0 /100 WBCS — SIGNIFICANT CHANGE UP (ref 0–0)
PLATELET # BLD AUTO: 194 K/UL — SIGNIFICANT CHANGE UP (ref 130–400)
POTASSIUM SERPL-MCNC: 5.1 MMOL/L — HIGH (ref 3.5–5)
POTASSIUM SERPL-SCNC: 5.1 MMOL/L — HIGH (ref 3.5–5)
PROT SERPL-MCNC: 8.1 G/DL — HIGH (ref 6–8)
RBC # BLD: 2.99 M/UL — LOW (ref 4.7–6.1)
RBC # FLD: 13 % — SIGNIFICANT CHANGE UP (ref 11.5–14.5)
SODIUM SERPL-SCNC: 135 MMOL/L — SIGNIFICANT CHANGE UP (ref 135–146)
TROPONIN T SERPL-MCNC: 0.25 NG/ML — CRITICAL HIGH
WBC # BLD: 10.86 K/UL — HIGH (ref 4.8–10.8)
WBC # FLD AUTO: 10.86 K/UL — HIGH (ref 4.8–10.8)

## 2020-01-28 PROCEDURE — 70496 CT ANGIOGRAPHY HEAD: CPT | Mod: 26

## 2020-01-28 PROCEDURE — 99223 1ST HOSP IP/OBS HIGH 75: CPT | Mod: AI

## 2020-01-28 PROCEDURE — 70498 CT ANGIOGRAPHY NECK: CPT | Mod: 26

## 2020-01-28 PROCEDURE — 99285 EMERGENCY DEPT VISIT HI MDM: CPT

## 2020-01-28 PROCEDURE — 93010 ELECTROCARDIOGRAM REPORT: CPT

## 2020-01-28 PROCEDURE — 70450 CT HEAD/BRAIN W/O DYE: CPT | Mod: 26,59

## 2020-01-28 PROCEDURE — 71045 X-RAY EXAM CHEST 1 VIEW: CPT | Mod: 26

## 2020-01-28 RX ORDER — MECLIZINE HCL 12.5 MG
25 TABLET ORAL ONCE
Refills: 0 | Status: COMPLETED | OUTPATIENT
Start: 2020-01-28 | End: 2020-01-28

## 2020-01-28 RX ORDER — SODIUM CHLORIDE 9 MG/ML
1000 INJECTION, SOLUTION INTRAVENOUS
Refills: 0 | Status: DISCONTINUED | OUTPATIENT
Start: 2020-01-28 | End: 2020-01-31

## 2020-01-28 RX ORDER — CALCIUM CARBONATE 500(1250)
1 TABLET ORAL THREE TIMES A DAY
Refills: 0 | Status: DISCONTINUED | OUTPATIENT
Start: 2020-01-28 | End: 2020-01-31

## 2020-01-28 RX ORDER — DEXTROSE 50 % IN WATER 50 %
12.5 SYRINGE (ML) INTRAVENOUS ONCE
Refills: 0 | Status: DISCONTINUED | OUTPATIENT
Start: 2020-01-28 | End: 2020-01-31

## 2020-01-28 RX ORDER — INSULIN LISPRO 100/ML
VIAL (ML) SUBCUTANEOUS
Refills: 0 | Status: DISCONTINUED | OUTPATIENT
Start: 2020-01-28 | End: 2020-01-31

## 2020-01-28 RX ORDER — SIMVASTATIN 20 MG/1
40 TABLET, FILM COATED ORAL AT BEDTIME
Refills: 0 | Status: DISCONTINUED | OUTPATIENT
Start: 2020-01-28 | End: 2020-01-31

## 2020-01-28 RX ORDER — SODIUM CHLORIDE 9 MG/ML
1000 INJECTION, SOLUTION INTRAVENOUS ONCE
Refills: 0 | Status: COMPLETED | OUTPATIENT
Start: 2020-01-28 | End: 2020-01-28

## 2020-01-28 RX ORDER — INSULIN LISPRO 100/ML
6 VIAL (ML) SUBCUTANEOUS
Refills: 0 | Status: DISCONTINUED | OUTPATIENT
Start: 2020-01-28 | End: 2020-01-29

## 2020-01-28 RX ORDER — DEXTROSE 50 % IN WATER 50 %
15 SYRINGE (ML) INTRAVENOUS ONCE
Refills: 0 | Status: DISCONTINUED | OUTPATIENT
Start: 2020-01-28 | End: 2020-01-31

## 2020-01-28 RX ORDER — MECLIZINE HCL 12.5 MG
25 TABLET ORAL THREE TIMES A DAY
Refills: 0 | Status: DISCONTINUED | OUTPATIENT
Start: 2020-01-28 | End: 2020-01-31

## 2020-01-28 RX ORDER — NIFEDIPINE 30 MG
60 TABLET, EXTENDED RELEASE 24 HR ORAL DAILY
Refills: 0 | Status: DISCONTINUED | OUTPATIENT
Start: 2020-01-28 | End: 2020-01-30

## 2020-01-28 RX ORDER — DEXTROSE 50 % IN WATER 50 %
25 SYRINGE (ML) INTRAVENOUS ONCE
Refills: 0 | Status: DISCONTINUED | OUTPATIENT
Start: 2020-01-28 | End: 2020-01-31

## 2020-01-28 RX ORDER — SEVELAMER CARBONATE 2400 MG/1
800 POWDER, FOR SUSPENSION ORAL
Refills: 0 | Status: DISCONTINUED | OUTPATIENT
Start: 2020-01-28 | End: 2020-01-31

## 2020-01-28 RX ORDER — CLOPIDOGREL BISULFATE 75 MG/1
75 TABLET, FILM COATED ORAL DAILY
Refills: 0 | Status: DISCONTINUED | OUTPATIENT
Start: 2020-01-28 | End: 2020-01-31

## 2020-01-28 RX ORDER — CALCIUM ACETATE 667 MG
1334 TABLET ORAL
Refills: 0 | Status: DISCONTINUED | OUTPATIENT
Start: 2020-01-28 | End: 2020-01-31

## 2020-01-28 RX ORDER — GLUCAGON INJECTION, SOLUTION 0.5 MG/.1ML
1 INJECTION, SOLUTION SUBCUTANEOUS ONCE
Refills: 0 | Status: DISCONTINUED | OUTPATIENT
Start: 2020-01-28 | End: 2020-01-31

## 2020-01-28 RX ORDER — METOCLOPRAMIDE HCL 10 MG
10 TABLET ORAL ONCE
Refills: 0 | Status: COMPLETED | OUTPATIENT
Start: 2020-01-28 | End: 2020-01-28

## 2020-01-28 RX ORDER — METOPROLOL TARTRATE 50 MG
50 TABLET ORAL
Refills: 0 | Status: DISCONTINUED | OUTPATIENT
Start: 2020-01-28 | End: 2020-01-31

## 2020-01-28 RX ORDER — FUROSEMIDE 40 MG
80 TABLET ORAL EVERY 12 HOURS
Refills: 0 | Status: DISCONTINUED | OUTPATIENT
Start: 2020-01-28 | End: 2020-01-30

## 2020-01-28 RX ORDER — INSULIN GLARGINE 100 [IU]/ML
12 INJECTION, SOLUTION SUBCUTANEOUS AT BEDTIME
Refills: 0 | Status: DISCONTINUED | OUTPATIENT
Start: 2020-01-28 | End: 2020-01-29

## 2020-01-28 RX ADMIN — Medication 80 MILLIGRAM(S): at 22:38

## 2020-01-28 RX ADMIN — Medication 25 MILLIGRAM(S): at 13:32

## 2020-01-28 RX ADMIN — Medication 1 TABLET(S): at 22:38

## 2020-01-28 RX ADMIN — INSULIN GLARGINE 12 UNIT(S): 100 INJECTION, SOLUTION SUBCUTANEOUS at 22:38

## 2020-01-28 RX ADMIN — Medication 50 MILLIGRAM(S): at 22:38

## 2020-01-28 RX ADMIN — SIMVASTATIN 40 MILLIGRAM(S): 20 TABLET, FILM COATED ORAL at 22:38

## 2020-01-28 RX ADMIN — Medication 10 MILLIGRAM(S): at 13:32

## 2020-01-28 RX ADMIN — SODIUM CHLORIDE 1000 MILLILITER(S): 9 INJECTION, SOLUTION INTRAVENOUS at 13:35

## 2020-01-28 NOTE — ED PROVIDER NOTE - CLINICAL SUMMARY MEDICAL DECISION MAKING FREE TEXT BOX
pt with pmhx as noted in ER for vertigo, feeling off balance with ambulation, resolved upon initial eval.  head ct no acute changes, CTA head/neck  - neg. labs with trop 0.25 (chronically elevated).  pt seen and eval by neuro, had limb ataxia on their eval, pt admitted to stroke unit for further eval.

## 2020-01-28 NOTE — H&P ADULT - ATTENDING COMMENTS
58 YO M with a PMH of CVA, CAD, ESRD on HD (MWF), DM, HTN, DLD, PAD, and CABG who presents to the hospital with a c/o dizziness (room spinning) for the past 2-3 days. Associated with difficulty walking and visual changes (blurry). Always occurs in the AM. Upon my discussion with the pt all of his symptoms had resolved. In the ED, CTH was neg and a CTA-head/neck showed no acute process. Neurology consulted and they recommended admission to the stroke unit for stroke rule out. Physical exam shows pt in NAD. VSS. CNs intact. Muscle strength/sensation intact. CTA B/L with no W/C/R. RRR. No LE swelling. Left forearm fistula with good thrill. Labs and radiology as above. Dizziness and ataxia, concerning for episodes of hypoglycemia, needs CVA/TIA rule out. Neuro is following. Admiot to stroke unit. A1c and Lipids. MRI in the AM. Neurochecks. Echo. Secondary stroke PPX. PT consult. Monitor FSs. Normocytic anemia, at goal for ESRD pt. No signs of bleeding. Hyperkalemia. Treat and repeat BMP. Elevated troponins in setting of ESRD. No CP or ischemic changes on EKG. Serial cardiac enzymes and EKG. ESRD on HD (MWF). Renal consult. Hx of DM, HTN, CAD, and DLD. Restart home meds. GI and DVT PPX. Fall precautions. Rest as per above note.

## 2020-01-28 NOTE — ED PROVIDER NOTE - NS ED ROS FT
Constitutional: See HPI.  Eyes: see hpi  ENMT:  No neck pain or   Cardiac: No cp  Respiratory: No cough   GI: +nausea, +vomiting,no  diarrhea or abdominal pain.  : No dysuria, frequency or burning.   MS: No myalgia, muscle weakness, joint pain or back pain.  Psych: No suicidal or homicidal ideations.  Neuro: see hpi  Skin: No skin rash.

## 2020-01-28 NOTE — CONSULT NOTE ADULT - SUBJECTIVE AND OBJECTIVE BOX
SCHWABACHER, LAWRENCE    Chief Complaint: Gait ataxia and dizziness.     HPI:  59 year old gentleman with a past medical history of stroke, CAD, CKD, DM, HTN, DLD, PAD, CABG reports waking up with dizziness and gait ataxia. Patient presented to the ED and room spinning dizziness resolved but ED staff noticed persistent gait ataxia. On Neuro exam NIHSS 2 for LUE LLE limb ataxia. CTH/CTA pending.        Relevant PMH:  [x] Prior ischemic stroke/TIA  [] Afib  [x]CAD  [x]HTN  [x]DLD  [x]DM [x]PVD []Obesity [] Sedintary lifestyle []CHF  []HAYDEN  []Cancer Hx     Social History: [] Smoking []  Drug Use: []   Alcohol Use:   [] Other:      Possible Location of Stroke:  Unknown at this time, will have a better understanding post stroke workup.  Possible Cause of Stroke:  Unknown at this time, will have a better understanding post stroke workup.  Relevant Cerebral Imaging:  pending  Relevant Cervicocerebral Imaging:    pending      Relevant blood tests:    pending  Relevant cardiac rhythm monitoring:  pending  Relevant Cardiac Structure:(TTE/JASON +/-):[]No intracardiac thrombus/[] no vegetation/[]no [place]ia/EF:  pending    Home Medications:  aspirin 81 mg oral tablet: 1 tab(s) orally once a day (27 Dec 2019 15:01)  Bydureon Pen 2 mg subcutaneous injection, extended release: 2 milligram(s) subcutaneous once a week (27 Dec 2019 15:01)  doxercalciferol 2 mcg/mL injectable solution: 0.2 microgram(s) injectable 3 times a week Mon, wed and Friday (04 Jan 2020 13:44)  Levemir 100 units/mL subcutaneous solution: 40 unit(s) subcutaneous once a day (at bedtime). 20 units 10/23/19 (27 Dec 2019 15:01)  Metoprolol Tartrate 50 mg oral tablet: 1 tab(s) orally 2 times a day (27 Dec 2019 15:01)  Plavix 75 mg oral tablet: 1 tab(s) orally once a day (27 Dec 2019 15:01)  simvastatin 40 mg oral tablet: 1 tab(s) orally once a day (at bedtime) (27 Dec 2019 15:01)      MEDICATIONS  (STANDING):      PT/OT/Speech/Rehab/S&Swr: pending    Exam:    Vital Signs Last 24 Hrs  T(C): 36.1 (28 Jan 2020 10:41), Max: 36.1 (28 Jan 2020 10:41)  T(F): 97 (28 Jan 2020 10:41), Max: 97 (28 Jan 2020 10:41)  HR: 66 (28 Jan 2020 13:23) (66 - 72)  BP: 103/59 (28 Jan 2020 13:23) (94/52 - 103/59)  BP(mean): --  RR: 19 (28 Jan 2020 13:23) (18 - 19)  SpO2: 96% (28 Jan 2020 13:23) (94% - 96%)    NIHSS      LOC:       1a: 0    1b(Questions):  0         1c(Instructions):     0        Best Gaze:0  Visual:00  Motor:                 RUE: 0   RLE:   0  LUE: 0    LLE:    0 FACE:0    Limb Ataxia:2  Sensory:     0  Language:    0   Dysarthria:     0     Extinction and Inattention:0          NIHSS today:     2        m-RS:2

## 2020-01-28 NOTE — H&P ADULT - NSHPPHYSICALEXAM_GEN_ALL_CORE
PHYSICAL EXAM:  GENERAL: NAD, well-developed  HEAD:  Atraumatic, Normocephalic  EYES: EOMI, PERRLA, conjunctiva and sclera clear  NECK: Supple, No JVD  CHEST/LUNG: Clear to auscultation bilaterally; No wheeze  HEART: Regular rate and rhythm; No murmurs, rubs, or gallops  ABDOMEN: Soft, Nontender, Nondistended; Bowel sounds present  EXTREMITIES:  2+ Peripheral Pulses, No clubbing, cyanosis, or edema  PSYCH: AAOx3

## 2020-01-28 NOTE — CONSULT NOTE ADULT - ASSESSMENT
Impression:  59 year old gentleman with a past medical history of stroke, CAD, CKD, DM, HTN, DLD, PAD, CABG reports waking up with dizziness and gait ataxia. Patient presented to the ED and room spinning dizziness resolved but ED staff noticed persistent gait ataxia. On Neuro exam NIHSS 2 for LUE LLE limb ataxia. CTH/CTA pending.  Etiology of symptoms unknown, will have a better understanding post stroke workup.     Suggestion:  Routine stroke workup:  MRI brain without kong.  TTE.  LDL, A1C  Telemetry monitoring.   PT OT Rehab        Disposition:  Stroke unit    Marco Diez NP  [phone]

## 2020-01-28 NOTE — ED ADULT NURSE REASSESSMENT NOTE - NS ED NURSE REASSESS COMMENT FT1
received pt from previous evening shift RN c./o dizziness , pt denies dizziness this time , AO x 4 , denies  any pain , no SOB , clear speech

## 2020-01-28 NOTE — H&P ADULT - NSHPLABSRESULTS_GEN_ALL_CORE
CBC Full  -  ( 28 Jan 2020 13:09 )  WBC Count : 10.86 K/uL  RBC Count : 2.99 M/uL  Hemoglobin : 9.2 g/dL  Hematocrit : 28.1 %  Platelet Count - Automated : 194 K/uL  Mean Cell Volume : 94.0 fL  Mean Cell Hemoglobin : 30.8 pg  Mean Cell Hemoglobin Concentration : 32.7 g/dL  Auto Neutrophil # : 9.29 K/uL  Auto Lymphocyte # : 1.04 K/uL  Auto Monocyte # : 0.31 K/uL  Auto Eosinophil # : 0.07 K/uL  Auto Basophil # : 0.09 K/uL  Auto Neutrophil % : 85.5 %  Auto Lymphocyte % : 9.6 %  Auto Monocyte % : 2.9 %  Auto Eosinophil % : 0.6 %  Auto Basophil % : 0.8 %    01-28    135  |  93<L>  |  34<H>  ----------------------------<  143<H>  5.1<H>   |  26  |  4.0<H>    Ca    8.6      28 Jan 2020 13:09    TPro  8.1<H>  /  Alb  4.6  /  TBili  <0.2  /  DBili  x   /  AST  13  /  ALT  7   /  AlkPhos  111  01-28

## 2020-01-28 NOTE — H&P ADULT - NSICDXPASTSURGICALHX_GEN_ALL_CORE_FT
PAST SURGICAL HISTORY:  H/O arterial bypass of lower limb Left Lower Extremity (2016)    History of surgery Left CEA (2017)  Left Pinkie toe Amputation (2014)  CABG x 4 (2012)  Card cath - stent (2008)      S/P CABG (coronary artery bypass graft) 2012
the EKG is unchanged from prior EKG

## 2020-01-28 NOTE — ED PROVIDER NOTE - PHYSICAL EXAMINATION
CONSTITUTIONAL: WA / WN / NAD  HEAD: NCAT  EYES: PERRL; EOMI;   ENT: Normal pharynx; mucous membranes pink/moist, no erythema.  NECK: Supple; no meningeal signs  CARD: RRR; nl S1/S2; no M/R/G.   RESP: Respiratory rate and effort are normal; breath sounds clear and equal bilaterally.  ABD: Soft, NT ND   MSK/EXT: No gross deformities; full range of motion.  SKIN: Warm and dry;   NEURO: AAOx3, Motor 5/5 x 4 extremities, CN II-XII intact. Gait Abnormal  PSYCH: Memory Intact, Normal Affect

## 2020-01-28 NOTE — ED ADULT TRIAGE NOTE - CHIEF COMPLAINT QUOTE
patient with hx of HD complaining of dizziness and blurred vision when he woke up this morning, patient has 3 episodes of vomiting. patient with hx of HD complaining of dizziness and blurred vision when he woke up this morning, patient has 3 episodes of vomiting. patient states blurred vision now resolved. neuro exam wnl. patient found to be slightly hypotensive in triage

## 2020-01-28 NOTE — ED PROVIDER NOTE - PMH
BPH (benign prostatic hyperplasia)    CAD (coronary artery disease)    Chronic anemia    Chronic kidney disease (CKD)  Stage IV  Diabetes mellitus    HLD (hyperlipidemia)    [place] (hard of hearing)    Hypertension    Myocardial infarction  2012  OA (osteoarthritis)    PAD (peripheral artery disease)  S/p bypass left leg  Pain in left knee  s/p fall  S/P CABG (coronary artery bypass graft)  x4  Stented coronary artery  in 2008  Transient ischemic attack (TIA)  2017; 2008

## 2020-01-28 NOTE — ED PROVIDER NOTE - CARE PLAN
Principal Discharge DX:	Dizziness  Secondary Diagnosis:	CKD (chronic kidney disease)  Secondary Diagnosis:	Gait abnormality  Secondary Diagnosis:	Elevated troponin

## 2020-01-28 NOTE — ED PROVIDER NOTE - ATTENDING CONTRIBUTION TO CARE
60 y/o male with h/o CAD s/p stent/cabg, ESRD recently started on HD 3 weeks ago- m/w/f, dm, htn, anemia, pad, tia, s/p L CEA 2017, in ER with c/o feeling dizzy.  Pt states this morning he woke up feeling dizzy - room spinning, felt nauseated and V x 3.  + feels off balance when walking. no fall.  no HA, no syncope/near syncope.  no cp/sob.  no abd pain.  Pt states symptoms have now resolved.   PE - nad, nc/at, eomi, perrl, op - clear, cta b/l, no w/r/r, rrr, abd- soft, nt/nd, nabs, from x 4 LUE avf with + thrill, A&O x 3, cn 2-12 intact, motor 5/5 b/l UE and LE, no sensory deficits, no ataxia.  -check labs, head ct, neuro eval.

## 2020-01-28 NOTE — H&P ADULT - HISTORY OF PRESENT ILLNESS
59 year old gentleman with a past medical history of CVA , CAD, ESRD / HD dependant , DM, HTN, DLD, PAD, CABG reports waking up with dizziness for couple of hours along with  gait ataxia. Patient presented to the ED , pt endorsed that room spinning along with dizziness which resolved later but ED staff noticed persistent gait ataxia. On Neuro exam NIHSS 2 for LUE LLE limb ataxia. In the ED pt was evaluated by neurology and underwent CTA / CT head which were negative . On writer's exam pt's symptoms had completely resolved. Pt also found to have troponemia but that is chronic.

## 2020-01-28 NOTE — ED PROVIDER NOTE - PROGRESS NOTE DETAILS
SR: neuro NP bradly aware of consult SR: neuro NP bradly mahoneyated patient recommending stroke unit. CARIDAD: discussed with NP bradly orozco, [facility] stroke unit accepting is serena.   PRINCESS Sandoval who admits to hospitalist. discussed with MAR Disucssed with renal, they are aware needs to be dialyzed tomorrow.

## 2020-01-28 NOTE — ED ADULT NURSE NOTE - OBJECTIVE STATEMENT
PT REPORTS FEELING DIZZY FOR A FEW WEEKS, PT STARTED HD M/W/F 3 WEEKS AGO WITH L AVF. PT HAD 3 EPISODES VOMITING WITH BLURRY VISION THIS AM WHICH RESOLVED. NO NEURO DEFICITS AT THIS TIME. NIH 0 PT REPORTS FEELING DIZZY FOR A FEW WEEKS, PT STARTED HD M/W/F 3 WEEKS AGO WITH L AVF. PT HAD 3 EPISODES VOMITING WITH BLURRY VISION THIS AM WHICH RESOLVED. PT HAS L ARM AND LLE ATAXIA. NIH 2.

## 2020-01-28 NOTE — H&P ADULT - ASSESSMENT
59 year old gentleman with a past medical history of CVA , CAD, ESRD / HD dependant , DM, HTN, DLD, PAD, CABG reports waking up with dizziness for couple of hours along with  gait ataxia. Patient presented to the ED , pt endorsed that room spinning along with dizziness which resolved later but ED staff noticed persistent gait ataxia. On Neuro exam NIHSS 2 for LUE LLE limb ataxia. In the ED pt was evaluated by neurology and underwent CTA / CT head which were negative . On writer's exam pt's symptoms had completely resolved. Pt also found to have troponemia but that is chronic.     #) TIA r/o stroke  MRI brain without kong.  TTE.  LDL, A1C  Telemetry monitoring.   Pt already on Plavix and aspirin  PT OT Rehab    #) ESRD / HD   - MWF , monitor BMP  - pt comfortable on room air  - pt recieved contrast today  - Continue Binders as prescribed  - continue Lasix and metolazone  - Nephrology consult    #) CAD  - continue metoprolol , aspirin and plavix  - continue statin  - troponins stable ,denies CP    #) HTN   -continue home dosage of medications    #) DM  - FS and insulin protocol    #) Diet Carb consistent     #) DVT PPX SCD , until MRI negative

## 2020-01-28 NOTE — H&P ADULT - NSICDXPASTMEDICALHX_GEN_ALL_CORE_FT
PAST MEDICAL HISTORY:  BPH (benign prostatic hyperplasia)     CAD (coronary artery disease)     Chronic anemia     Chronic kidney disease (CKD) Stage IV    Diabetes mellitus     HLD (hyperlipidemia)     [place] (hard of hearing)     Hypertension     Myocardial infarction 2012    OA (osteoarthritis)     PAD (peripheral artery disease) S/p bypass left leg    Pain in left knee s/p fall    S/P CABG (coronary artery bypass graft) x4    Stented coronary artery in 2008    Transient ischemic attack (TIA) 2017; 2008

## 2020-01-28 NOTE — ED ADULT NURSE NOTE - PMH
BPH (benign prostatic hyperplasia)    CAD (coronary artery disease)    Chronic anemia    Chronic kidney disease (CKD)  Stage IV  Diabetes mellitus    HLD (hyperlipidemia)    [place] (hard of hearing)    Hypertension    Myocardial infarction  2012  OA (osteoarthritis)    PAD (peripheral artery disease)  S/p bypass left leg  Pain in left knee  s/p fall  S/P CABG (coronary artery bypass graft)  x4  Stented coronary artery  in 2008  Transient ischemic attack (TIA)  2017; 2008

## 2020-01-28 NOTE — ED PROVIDER NOTE - OBJECTIVE STATEMENT
59 year old male with a pmh of cad s/p stent, cabg, dm htn hld mi, pad, tia ESRD which he began 3 weeks ago M/W/F presents here for dizziness. Patient states today when he woke up he began feeling dizzy. Dizziness was associated with blurry vision and 3 episodes of vomiting. Patient states his symptoms all resolved upon arrival. Denies any recent fever chills cough cp sob abdominal pain or urinary complaints.

## 2020-01-28 NOTE — ED ADULT NURSE NOTE - CHIEF COMPLAINT QUOTE
patient with hx of HD complaining of dizziness and blurred vision when he woke up this morning, patient has 3 episodes of vomiting. patient states blurred vision now resolved. neuro exam wnl. patient found to be slightly hypotensive in triage

## 2020-01-29 ENCOUNTER — TRANSCRIPTION ENCOUNTER (OUTPATIENT)
Age: 60
End: 2020-01-29

## 2020-01-29 LAB
ANION GAP SERPL CALC-SCNC: 16 MMOL/L — HIGH (ref 7–14)
BUN SERPL-MCNC: 43 MG/DL — HIGH (ref 10–20)
CALCIUM SERPL-MCNC: 8.2 MG/DL — LOW (ref 8.5–10.1)
CHLORIDE SERPL-SCNC: 95 MMOL/L — LOW (ref 98–110)
CO2 SERPL-SCNC: 25 MMOL/L — SIGNIFICANT CHANGE UP (ref 17–32)
CREAT SERPL-MCNC: 4.9 MG/DL — CRITICAL HIGH (ref 0.7–1.5)
GLUCOSE BLDC GLUCOMTR-MCNC: 112 MG/DL — HIGH (ref 70–99)
GLUCOSE BLDC GLUCOMTR-MCNC: 165 MG/DL — HIGH (ref 70–99)
GLUCOSE BLDC GLUCOMTR-MCNC: 180 MG/DL — HIGH (ref 70–99)
GLUCOSE BLDC GLUCOMTR-MCNC: 198 MG/DL — HIGH (ref 70–99)
GLUCOSE SERPL-MCNC: 120 MG/DL — HIGH (ref 70–99)
HCT VFR BLD CALC: 27.3 % — LOW (ref 42–52)
HGB BLD-MCNC: 8.6 G/DL — LOW (ref 14–18)
MCHC RBC-ENTMCNC: 29.9 PG — SIGNIFICANT CHANGE UP (ref 27–31)
MCHC RBC-ENTMCNC: 31.5 G/DL — LOW (ref 32–37)
MCV RBC AUTO: 94.8 FL — HIGH (ref 80–94)
NRBC # BLD: 0 /100 WBCS — SIGNIFICANT CHANGE UP (ref 0–0)
PHOSPHATE SERPL-MCNC: 6.1 MG/DL — HIGH (ref 2.1–4.9)
PLATELET # BLD AUTO: 192 K/UL — SIGNIFICANT CHANGE UP (ref 130–400)
POTASSIUM SERPL-MCNC: 4.8 MMOL/L — SIGNIFICANT CHANGE UP (ref 3.5–5)
POTASSIUM SERPL-SCNC: 4.8 MMOL/L — SIGNIFICANT CHANGE UP (ref 3.5–5)
RBC # BLD: 2.88 M/UL — LOW (ref 4.7–6.1)
RBC # FLD: 13 % — SIGNIFICANT CHANGE UP (ref 11.5–14.5)
SODIUM SERPL-SCNC: 136 MMOL/L — SIGNIFICANT CHANGE UP (ref 135–146)
WBC # BLD: 9.97 K/UL — SIGNIFICANT CHANGE UP (ref 4.8–10.8)
WBC # FLD AUTO: 9.97 K/UL — SIGNIFICANT CHANGE UP (ref 4.8–10.8)

## 2020-01-29 PROCEDURE — 99231 SBSQ HOSP IP/OBS SF/LOW 25: CPT

## 2020-01-29 PROCEDURE — 99233 SBSQ HOSP IP/OBS HIGH 50: CPT

## 2020-01-29 PROCEDURE — 70551 MRI BRAIN STEM W/O DYE: CPT | Mod: 26

## 2020-01-29 RX ORDER — INSULIN LISPRO 100/ML
5 VIAL (ML) SUBCUTANEOUS
Refills: 0 | Status: DISCONTINUED | OUTPATIENT
Start: 2020-01-29 | End: 2020-01-31

## 2020-01-29 RX ORDER — INSULIN GLARGINE 100 [IU]/ML
15 INJECTION, SOLUTION SUBCUTANEOUS AT BEDTIME
Refills: 0 | Status: DISCONTINUED | OUTPATIENT
Start: 2020-01-29 | End: 2020-01-31

## 2020-01-29 RX ORDER — HEPARIN SODIUM 5000 [USP'U]/ML
5000 INJECTION INTRAVENOUS; SUBCUTANEOUS EVERY 12 HOURS
Refills: 0 | Status: DISCONTINUED | OUTPATIENT
Start: 2020-01-29 | End: 2020-01-31

## 2020-01-29 RX ADMIN — Medication 1 TABLET(S): at 07:11

## 2020-01-29 RX ADMIN — SEVELAMER CARBONATE 800 MILLIGRAM(S): 2400 POWDER, FOR SUSPENSION ORAL at 09:13

## 2020-01-29 RX ADMIN — Medication 1 MILLIGRAM(S): at 09:05

## 2020-01-29 RX ADMIN — Medication 5 UNIT(S): at 18:06

## 2020-01-29 RX ADMIN — SIMVASTATIN 40 MILLIGRAM(S): 20 TABLET, FILM COATED ORAL at 22:10

## 2020-01-29 RX ADMIN — SEVELAMER CARBONATE 800 MILLIGRAM(S): 2400 POWDER, FOR SUSPENSION ORAL at 12:33

## 2020-01-29 RX ADMIN — Medication 50 MILLIGRAM(S): at 07:11

## 2020-01-29 RX ADMIN — Medication 80 MILLIGRAM(S): at 18:06

## 2020-01-29 RX ADMIN — Medication 1 TABLET(S): at 22:10

## 2020-01-29 RX ADMIN — Medication 80 MILLIGRAM(S): at 07:11

## 2020-01-29 RX ADMIN — Medication 2: at 18:06

## 2020-01-29 RX ADMIN — Medication 1334 MILLIGRAM(S): at 09:13

## 2020-01-29 RX ADMIN — Medication 1334 MILLIGRAM(S): at 22:10

## 2020-01-29 RX ADMIN — Medication 50 MILLIGRAM(S): at 18:10

## 2020-01-29 RX ADMIN — CLOPIDOGREL BISULFATE 75 MILLIGRAM(S): 75 TABLET, FILM COATED ORAL at 12:32

## 2020-01-29 RX ADMIN — INSULIN GLARGINE 15 UNIT(S): 100 INJECTION, SOLUTION SUBCUTANEOUS at 22:08

## 2020-01-29 RX ADMIN — Medication 1334 MILLIGRAM(S): at 12:32

## 2020-01-29 RX ADMIN — Medication 1334 MILLIGRAM(S): at 18:06

## 2020-01-29 RX ADMIN — HEPARIN SODIUM 5000 UNIT(S): 5000 INJECTION INTRAVENOUS; SUBCUTANEOUS at 18:09

## 2020-01-29 RX ADMIN — SEVELAMER CARBONATE 800 MILLIGRAM(S): 2400 POWDER, FOR SUSPENSION ORAL at 18:06

## 2020-01-29 RX ADMIN — Medication 60 MILLIGRAM(S): at 07:11

## 2020-01-29 NOTE — DISCHARGE NOTE PROVIDER - CARE PROVIDER_API CALL
Santy Chadwick; KEVYN)  Cardiac Electrophysiology  [address] [facility]  [place] [address]  Phone: [phone]  Fax: [phone]  Follow Up Time: 1 week    Landen Rogers)  EEGEpilepsy; Neurology  [address] [place] [address], [address]  [place] [address]  Phone: [phone]  Fax: [phone]  Established Patient  Follow Up Time: 2 weeks    Nancie Cazares)  Internal Medicine; Nephrology  [address] [place] [address]  Phone: [phone]  Fax: [phone]  Established Patient  Follow Up Time: 2 weeks

## 2020-01-29 NOTE — DISCHARGE NOTE PROVIDER - NSDCMRMEDTOKEN_GEN_ALL_CORE_FT
aspirin 81 mg oral tablet: 1 tab(s) orally once a day  Bydureon Pen 2 mg subcutaneous injection, extended release: 2 milligram(s) subcutaneous once a week  calcium acetate 667 mg oral tablet: 2 tab(s) orally every 8 hours   calcium carbonate 500 mg (200 mg elemental calcium) oral tablet, chewable: 2 tab(s) orally every 8 hours  doxercalciferol 2 mcg/mL injectable solution: 0.2 microgram(s) injectable 3 times a week Mon, wed and Friday  furosemide 80 mg oral tablet: 1 tab(s) orally every 12 hours  Levemir 100 units/mL subcutaneous solution: 40 unit(s) subcutaneous once a day (at bedtime). 20 units 10/23/19  metOLazone 2.5 mg oral tablet: 1 tab(s) orally every 48 hours   Metoprolol Tartrate 50 mg oral tablet: 1 tab(s) orally 2 times a day  NIFEdipine 60 mg oral tablet, extended release: 1 tab(s) orally once a day  Plavix 75 mg oral tablet: 1 tab(s) orally once a day  sevelamer carbonate 800 mg oral tablet: 1 tab(s) orally 3 times a day (with meals)  simvastatin 40 mg oral tablet: 1 tab(s) orally once a day (at bedtime) aspirin 81 mg oral tablet: 1 tab(s) orally once a day  Bydureon Pen 2 mg subcutaneous injection, extended release: 2 milligram(s) subcutaneous once a week  calcium acetate 667 mg oral tablet: 2 tab(s) orally every 8 hours   calcium carbonate 500 mg (200 mg elemental calcium) oral tablet, chewable: 2 tab(s) orally every 8 hours  doxercalciferol 2 mcg/mL injectable solution: 0.2 microgram(s) injectable 3 times a week Mon, wed and Friday  Lasix 20 mg oral tablet: 3 tab(s) orally 2 times a day   Levemir 100 units/mL subcutaneous solution: 40 unit(s) subcutaneous once a day (at bedtime). 20 units 10/23/19  metOLazone 2.5 mg oral tablet: 1 tab(s) orally every 48 hours   Metoprolol Tartrate 50 mg oral tablet: 1 tab(s) orally 2 times a day  NIFEdipine 30 mg oral tablet, extended release: 1 tab(s) orally once a day   Plavix 75 mg oral tablet: 1 tab(s) orally once a day  sevelamer carbonate 800 mg oral tablet: 1 tab(s) orally 3 times a day (with meals)  simvastatin 40 mg oral tablet: 1 tab(s) orally once a day (at bedtime)

## 2020-01-29 NOTE — PROGRESS NOTE ADULT - SUBJECTIVE AND OBJECTIVE BOX
LAWRENCE SCHWABACHER 59y Male  MRN#: [id number]     SUBJECTIVE  Patient is a 59y old Male who presents with a chief complaint of Dizziness / Gait ataxia   Currently admitted to medicine with the primary diagnosis of Dizziness  Today is hospital day 1d, and this morning he reports no overnight events.     OBJECTIVE  PAST MEDICAL & SURGICAL HISTORY  Chronic anemia  [place] (hard of hearing)  OA (osteoarthritis)  Pain in left knee: s/p fall  BPH (benign prostatic hyperplasia)  HLD (hyperlipidemia)  PAD (peripheral artery disease): S/p bypass left leg  Myocardial infarction: 2012  Stented coronary artery: in 2008  Hypertension  Chronic kidney disease (CKD): Stage IV  Transient ischemic attack (TIA): 2017; 2008  Diabetes mellitus  S/P CABG (coronary artery bypass graft): x4  CAD (coronary artery disease)  History of surgery: Left CEA (2017)  Left Pinkie toe Amputation (2014)  CABG x 4 (2012)  Card cath - stent (2008)    H/O arterial bypass of lower limb: Left Lower Extremity (2016)  S/P CABG (coronary artery bypass graft): 2012    ALLERGIES:  No Known Allergies    MEDICATIONS:  STANDING MEDICATIONS  calcium acetate 1334 milliGRAM(s) Oral four times a day with meals  calcium carbonate    500 mG (Tums) Chewable 1 Tablet(s) Chew three times a day  clopidogrel Tablet 75 milliGRAM(s) Oral daily  dextrose 5%. 1000 milliLiter(s) IV Continuous <Continuous>  dextrose 50% Injectable 12.5 Gram(s) IV Push once  dextrose 50% Injectable 25 Gram(s) IV Push once  dextrose 50% Injectable 25 Gram(s) IV Push once  furosemide    Tablet 80 milliGRAM(s) Oral every 12 hours  insulin glargine Injectable (LANTUS) 12 Unit(s) SubCutaneous at bedtime  insulin lispro (HumaLOG) corrective regimen sliding scale   SubCutaneous three times a day before meals  insulin lispro Injectable (HumaLOG) 6 Unit(s) SubCutaneous three times a day before meals  metolazone Oral Tab/Cap - Peds 2.5 milliGRAM(s) Oral every 48 hours  metoprolol tartrate 50 milliGRAM(s) Oral two times a day  NIFEdipine XL 60 milliGRAM(s) Oral daily  sevelamer carbonate 800 milliGRAM(s) Oral three times a day with meals  simvastatin 40 milliGRAM(s) Oral at bedtime    PRN MEDICATIONS  dextrose 40% Gel 15 Gram(s) Oral once PRN  glucagon  Injectable 1 milliGRAM(s) IntraMuscular once PRN  meclizine 25 milliGRAM(s) Oral three times a day PRN      VITAL SIGNS: Last 24 Hours  T(C): 36.6 (29 Jan 2020 08:11), Max: 36.7 (28 Jan 2020 23:36)  T(F): 97.9 (29 Jan 2020 08:11), Max: 98.1 (28 Jan 2020 23:36)  HR: 73 (29 Jan 2020 08:11) (66 - 76)  BP: 139/63 (29 Jan 2020 08:11) (94/52 - 139/63)  BP(mean): 91 (29 Jan 2020 08:11) (91 - 91)  RR: 16 (29 Jan 2020 08:11) (16 - 19)  SpO2: 100% (29 Jan 2020 08:11) (94% - 100%)    LABS:                        8.6    9.97  )-----------( 192      ( 29 Jan 2020 07:06 )             27.3     01-29    136  |  95<L>  |  43<H>  ----------------------------<  120<H>  4.8   |  25  |  4.9<HH>    Ca    8.2<L>      29 Jan 2020 07:06  Phos  6.1     01-29    TPro  8.1<H>  /  Alb  4.6  /  TBili  <0.2  /  DBili  x   /  AST  13  /  ALT  7   /  AlkPhos  111  01-28          Lactate, Blood: 1.0 mmol/L (01-28-20 @ 13:09)  Troponin T, Serum: 0.25 ng/mL <HH> (01-28-20 @ 13:09)      CARDIAC MARKERS ( 28 Jan 2020 13:09 )  x     / 0.25 ng/mL / x     / x     / x          RADIOLOGY:  < from: Xray Chest 1 View-PORTABLE IMMEDIATE (01.28.20 @ 14:21) >  Impression:      No radiographic evidence of acute cardiopulmonary disease.    Unchanged.    < end of copied text >      < from: Transthoracic Echocardiogram (12.28.19 @ 13:14) >  Summary:   1. LV Ejection Fraction by Jose's Method with a biplane EF of 51 %.   2. Spectral Doppler shows pseudonormal pattern of left ventricular myocardial filling (Grade II diastolic dysfunction).   3. Moderatepleural effusion in the left lateral region.   4. Mild to moderate mitral valve regurgitation.   5. Mitral annular calcification.   6. Thickening and calcification of the anterior and posterior mitral valve leaflets.      < end of copied text >    < from: CT Head No Cont (01.28.20 @ 17:38) >    IMPRESSION:     No CTA evidence of major vascular stenoses or occlusions.    No CT evidence of acute intracranial pathology.    < end of copied text >      PHYSICAL EXAM:    GENERAL: NAD, well-developed, AAOx3  HEENT:  Atraumatic, Normocephalic. EOMI, PERRLA, conjunctiva and sclera clear, No JVD  PULMONARY: Clear to auscultation bilaterally; No wheeze  CARDIOVASCULAR: Regular rate and rhythm; No murmurs, rubs, or gallops  GASTROINTESTINAL: Soft, Nontender, Nondistended; Bowel sounds present  MUSCULOSKELETAL:  2+ Peripheral Pulses, No clubbing, cyanosis, or edema  NEUROLOGY: non-focal  SKIN: No rashes or lesions      ADMISSION SUMMARY  Patient is a 59y old Male who presents with a chief complaint of Dizziness / Gait ataxia   Currently admitted to medicine with the primary diagnosis of Dizziness    ASSESSMENT & PLAN    59 year old gentleman with a past medical history of CVA , CAD, ESRD / HD dependant , DM, HTN, DLD, PAD, CABG reports waking up with dizziness for couple of hours along with  gait ataxia. Patient presented to the ED , pt endorsed that room spinning along with dizziness which resolved later but ED staff noticed persistent gait ataxia. On Neuro exam NIHSS 2 for LUE LLE limb ataxia. In the ED pt was evaluated by neurology and underwent CTA / CT head which were negative . On writer's exam pt's symptoms had completely resolved. Pt also found to have troponemia but that is chronic.     #TIA r/o stroke  -Presented with vertigo and limb ataxia. NIHSS on admission was 2 for ataxia  -Physical exam today showed no sensory deficits. Power 5/5 in all 4 extremities. Finger nose abnormal in left UE, Normal on right side. No ataxia  -History of multiple TIAs in the past and CAD, PVD. Cannot rule out cardio embolic/ thromboembolic etiology ??  -CT head showed no evidence of hemorrhage. CT angio head and neck showed no major vessel stenosis  -MRI brain without kong ordered  -ECHO ordered  -f/u Hb A1c and lipid panel in am  -Patient on aspirin and plavix at home  -Monitor in stroke unit  -Neuro checks q4h  -Neurology following  -PT/OT ordered    #ESRD on HD MWF  -Recently started on HD 3 weeks ago through left A-V fistula  -Electrolytes normal, no signs of volume overload  -Phosphorus 6. Continue sevelamer and phoslo  -Nephrology consult placed    #CAD  - continue metoprolol, aspirin and plavix, statin  - troponins stable, denies CP    #HTN  -BP controlled  -continue lasix, metoprolol and nifedipine    #DM  - Continue Insulin glargine and lispro  - Adjusted the dosages  - Monitor FS and adjust as needed    #Diet: Carb consistent   #DVT Ppx: Heparin sq   #GI ppx: Protonix  #Dispo: Acute

## 2020-01-29 NOTE — CONSULT NOTE ADULT - SUBJECTIVE AND OBJECTIVE BOX
NEPHROLOGY CONSULTATION NOTE    59 year old gentleman with a past medical history of CVA , CAD, ESRD / HD dependant , DM, HTN, DLD, PAD, CABG reports waking up with dizziness for couple of hours along with  gait ataxia. Patient presented to the ED , pt endorsed that room spinning along with dizziness which resolved later but ED staff noticed persistent gait ataxia. On Neuro exam NIHSS 2 for LUE LLE limb ataxia. In the ED pt was evaluated by neurology and underwent CTA / CT head which were negative . On writer's exam pt's symptoms had completely resolved. Pt also found to have troponemia but that is chronic. (1/28/2020)    symptoms resolved now. no new complaints.    PAST MEDICAL & SURGICAL HISTORY:  Chronic anemia  [place] (hard of hearing)  OA (osteoarthritis)  Pain in left knee: s/p fall  BPH (benign prostatic hyperplasia)  HLD (hyperlipidemia)  PAD (peripheral artery disease): S/p bypass left leg  Myocardial infarction: 2012  Stented coronary artery: in 2008  Hypertension  Chronic kidney disease (CKD): Stage IV  Transient ischemic attack (TIA): 2017; 2008  Diabetes mellitus  S/P CABG (coronary artery bypass graft): x4  CAD (coronary artery disease)  History of surgery: Left CEA (2017)  Left Pinkie toe Amputation (2014)  CABG x 4 (2012)  Card cath - stent (2008)    H/O arterial bypass of lower limb: Left Lower Extremity (2016)  S/P CABG (coronary artery bypass graft): 2012    Allergies:  No Known Allergies    Home Medications:  aspirin 81 mg oral tablet: 1 tab(s) orally once a day (27 Dec 2019 15:01)  Bydureon Pen 2 mg subcutaneous injection, extended release: 2 milligram(s) subcutaneous once a week (27 Dec 2019 15:01)  doxercalciferol 2 mcg/mL injectable solution: 0.2 microgram(s) injectable 3 times a week Mon, wed and Friday (04 Jan 2020 13:44)  Levemir 100 units/mL subcutaneous solution: 40 unit(s) subcutaneous once a day (at bedtime). 20 units 10/23/19 (27 Dec 2019 15:01)  Metoprolol Tartrate 50 mg oral tablet: 1 tab(s) orally 2 times a day (27 Dec 2019 15:01)  Plavix 75 mg oral tablet: 1 tab(s) orally once a day (27 Dec 2019 15:01)  simvastatin 40 mg oral tablet: 1 tab(s) orally once a day (at bedtime) (27 Dec 2019 15:01)    Hospital Medications:   MEDICATIONS  (STANDING):  calcium acetate 1334 milliGRAM(s) Oral four times a day with meals  calcium carbonate    500 mG (Tums) Chewable 1 Tablet(s) Chew three times a day  clopidogrel Tablet 75 milliGRAM(s) Oral daily  furosemide    Tablet 80 milliGRAM(s) Oral every 12 hours  heparin  Injectable 5000 Unit(s) SubCutaneous every 12 hours  insulin glargine Injectable (LANTUS) 15 Unit(s) SubCutaneous at bedtime  insulin lispro (HumaLOG) corrective regimen sliding scale   SubCutaneous three times a day before meals  insulin lispro Injectable (HumaLOG) 5 Unit(s) SubCutaneous three times a day before meals  metolazone Oral Tab/Cap - Peds 2.5 milliGRAM(s) Oral every 48 hours  metoprolol tartrate 50 milliGRAM(s) Oral two times a day  NIFEdipine XL 60 milliGRAM(s) Oral daily  sevelamer carbonate 800 milliGRAM(s) Oral three times a day with meals  simvastatin 40 milliGRAM(s) Oral at bedtime      SOCIAL HISTORY:  Denies ETOH,Smoking,   FAMILY HISTORY:  ESRD (end stage renal disease) on dialysis (Mother)  DM (diabetes mellitus) (Mother)  Family history of heart disease (Father)        REVIEW OF SYSTEMS:    All other review of systems is negative unless indicated above.    VITALS:  T(F): 97.9 (01-29-20 @ 08:11), Max: 98.1 (01-28-20 @ 23:36)  HR: 73 (01-29-20 @ 08:11)  BP: 139/63 (01-29-20 @ 08:11)  RR: 16 (01-29-20 @ 08:11)  SpO2: 100% (01-29-20 @ 08:11)    Height (cm): 180.3 (01-29 @ 07:31)  Weight (kg): 86.2 (01-29 @ 07:31)  BMI (kg/m2): 26.5 (01-29 @ 07:31)  BSA (m2): 2.06 (01-29 @ 07:31)    I&O's Detail        PHYSICAL EXAM:  Constitutional: NAD  Respiratory: CTAB, no wheezes, rales or rhonchi  Cardiovascular: S1, S2, RRR  Gastrointestinal: BS+, soft, NT/ND  Extremities: No peripheral edema  Neurological: A/O x 3  : No schneider.     Vascular Access: left arm AVF    LABS:  01-29    136  |  95<L>  |  43<H>  ----------------------------<  120<H>  4.8   |  25  |  4.9<HH>    Ca    8.2<L>      29 Jan 2020 07:06  Phos  6.1     01-29    TPro  8.1<H>  /  Alb  4.6  /  TBili  <0.2  /  DBili      /  AST  13  /  ALT  7   /  AlkPhos  111  01-28    Creatinine Trend: 4.9 <--, 4.0 <--, 5.6 <--, 6.9 <--, 6.9 <--, 5.9 <--, 6.4 <--                        8.6    9.97  )-----------( 192      ( 29 Jan 2020 07:06 )             27.3     Troponin T, Serum: 0.25 ng/mL (01.28.20 @ 13:09)    Urine Studies:      RADIOLOGY & ADDITIONAL STUDIES:  < from: MR Head No Cont (01.29.20 @ 09:45) >  IMPRESSION:    No acute/early subacute infarction or MRI evidence of blood products.    Mild chronic ischemic changes in the frontoparietal white matter and punctate chronic lacunar infarcts in the right basal ganglia and thalami.    Small punctate focus of low signal on GRE images in the left posterior temporoparietal subcortical white matter which is unchanged compared with prior exam and may represent a chronic microhemorrhage.    Large right and small left mastoid effusions, unchanged compared to prior MRI from 2/15/2019.    < end of copied text >    < from: CT Angio Neck w/ IV Cont (01.28.20 @ 17:40) >  IMPRESSION:     No CTA evidence of major vascular stenoses or occlusions.    No CT evidence of acute intracranial pathology.    < end of copied text >    < from: CT Head No Cont (01.28.20 @ 17:38) >  IMPRESSION:     No CTA evidence of major vascular stenoses or occlusions.    No CT evidence of acute intracranial pathology.    < end of copied text >

## 2020-01-29 NOTE — DISCHARGE NOTE PROVIDER - NSDCCPCAREPLAN_GEN_ALL_CORE_FT
PRINCIPAL DISCHARGE DIAGNOSIS  Diagnosis: TIA (transient ischemic attack)  Assessment and Plan of Treatment: You were admitted with vertigo which currently resolved. Stroke code was called on admission. Work up for stroke was negative. MRI of brain did not show any evidence of stroke. Since you had multiple TIAs in the past neurology recommended an event monitor to rule out any abnormal heart rhythms which are possibly leading to TIAs. Please follow up with electrophysiology outpatient for event monitor placement. We left the recommendations for EP doctors in your discharge papers.      SECONDARY DISCHARGE DIAGNOSES  Diagnosis: CKD (chronic kidney disease)  Assessment and Plan of Treatment: continue outpatient hemodialysis. PRINCIPAL DISCHARGE DIAGNOSIS  Diagnosis: TIA (transient ischemic attack)  Assessment and Plan of Treatment: You were admitted with vertigo which currently resolved. Stroke code was called on admission. Work up for stroke was negative. MRI of brain did not show any evidence of stroke. Since you had multiple TIAs in the past neurology recommended an event monitor to rule out any abnormal heart rhythms which are possibly leading to TIAs. Please follow up with electrophysiology outpatient for event monitor placement. We left the recommendations for EP doctors in your discharge papers.      SECONDARY DISCHARGE DIAGNOSES  Diagnosis: Orthostatic hypotension  Assessment and Plan of Treatment: Your dizziness was partially from drop in BP on standing up from sitting position. We adjusted your BP medications and sent the medications to the pharmacy. Please wear the compression stockings to avoid drop in BP on standing.    Diagnosis: CKD (chronic kidney disease)  Assessment and Plan of Treatment: continue outpatient hemodialysis.

## 2020-01-29 NOTE — CONSULT NOTE ADULT - ATTENDING COMMENTS
Pt seen and examined on HD  above note reviewed  discussed w/ fellow  possibole d/c today noted   plan as above

## 2020-01-29 NOTE — PROGRESS NOTE ADULT - ASSESSMENT
59 year old gentleman with a past medical history of stroke, CAD, CKD, DM, HTN, DLD, PAD, CABG reports waking up with dizziness and gait ataxia.  On Neuro exam NIHSS 2 for LUE LLE limb ataxia. CTH/CTA wnl.  Etiology of symptoms unknown, will have a better understanding post stroke workup.     Plan  admit to stroke unit:  MRI brain without kong.  TTE.  LDL,   Telemetry monitoring.   PT OT Rehab

## 2020-01-29 NOTE — DISCHARGE NOTE PROVIDER - HOSPITAL COURSE
59 year old gentleman with a past medical history of CVA , CAD, ESRD / HD dependant , DM, HTN, DLD, PAD, CABG reports waking up with dizziness for couple of hours along with  gait ataxia. Patient presented to the ED, pt endorsed that room is spinning along with dizziness which resolved later but ED staff noticed persistent gait ataxia. On Neuro exam NIHSS 2 for LUE LLE limb ataxia. In the ED pt was evaluated by neurology and underwent CTA / CT head which were negative . Patient 's symptoms had completely resolved. Pt also found to have troponemia that is chronic. MR brain negative for any intracranial pathology. Patient had multiple TIAs in the past. Neurology recommended OP EP evaluation for event monitor. Patient ambulated with PT. Patient is being discharged to follow up with neurology as outpatient. 59 year old gentleman with a past medical history of CVA , CAD, ESRD / HD dependant , DM, HTN, DLD, PAD, CABG reports waking up with dizziness for couple of hours along with  gait ataxia. Patient presented to the ED, pt endorsed that room is spinning along with dizziness which resolved later but ED staff noticed persistent gait ataxia. On Neuro exam NIHSS 2 for LUE LLE limb ataxia. In the ED pt was evaluated by neurology and underwent CTA / CT head which were negative . Patient 's symptoms had completely resolved. Pt also found to have troponemia that is chronic. MR brain negative for any intracranial pathology. Patient had multiple TIAs in the past. Neurology recommended OP EP evaluation for event monitor. Patient ambulated with PT. Patient is being discharged to follow up with neurology as outpatient. Patient also has orthostatic hypotension. BP medications were adjusted and sent to the pharmacy.

## 2020-01-29 NOTE — CONSULT NOTE ADULT - ASSESSMENT
58 y/o M with ESRD - HD in hospital for dizziness and gait ataxia.  PMH CVA , CAD, ESRD / HD dependant , DM, HTN, DLD, PAD, CABG    # ESRD - HD: s/p HD on Monday   - for HD today with 3 hr 2K bath UF 2L as tolerated   - BP at goal. keep current   - anemia: Hb noted, will not give aranesp on HD today, consider pt in hospital for suspected CVA.   - Ca at goal, hyperphos noted. cont. binders. dietary counseling.   - dizziness / ataxia: neurology following

## 2020-01-29 NOTE — DISCHARGE NOTE PROVIDER - CARE PROVIDERS DIRECT ADDRESSES
[email or web address]

## 2020-01-29 NOTE — DISCHARGE NOTE PROVIDER - PROVIDER TOKENS
PROVIDER:[TOKEN:[[id number]],FOLLOWUP:[1 week]],PROVIDER:[TOKEN:[[id number]],FOLLOWUP:[2 weeks],ESTABLISHEDPATIENT:[T]],PROVIDER:[TOKEN:[[id number]],FOLLOWUP:[2 weeks],ESTABLISHEDPATIENT:[T]]

## 2020-01-29 NOTE — PROGRESS NOTE ADULT - SUBJECTIVE AND OBJECTIVE BOX
Neurology Follow up note  Patient examined at bedside, no new complaints. No acute overnight events.      Vital Signs Last 24 Hrs  T(C): 36.7 (29 Jan 2020 04:00), Max: 36.7 (28 Jan 2020 23:36)  T(F): 98 (29 Jan 2020 04:00), Max: 98.1 (28 Jan 2020 23:36)  HR: 74 (29 Jan 2020 04:00) (66 - 76)  BP: 132/77 (29 Jan 2020 04:00) (94/52 - 132/77)  BP(mean): --  RR: 18 (29 Jan 2020 04:00) (18 - 19)  SpO2: 99% (29 Jan 2020 04:00) (94% - 99%)          Neurological Exam:   Mental status: Awake, alert and oriented x3.  Naming, repetition and comprehension intact.  Attention/concentration intact.  No dysarthria, no aphasia.  Fund of knowledge appropriate.    Cranial nerves: Intact.    Motor: 5/5 throughout/ no drift  Sensation: Intact   Coordination: Mild left dysmetria no limb ataxia  Gait: deferred      NIHSS  LOC:       1a: 0    1b(Questions):  0         1c(Instructions):     0        Best Gaze:0  Visual:00  Motor:                 RUE: 0   RLE:   0  LUE: 0    LLE:    0 FACE:0    Limb Ataxia: 1  Sensory:     0  Language:    0   Dysarthria:     0     Extinction and Inattention:0    NIHSS today:     1        m-RS:2      Medications  calcium acetate 1334 milliGRAM(s) Oral four times a day with meals  calcium carbonate    500 mG (Tums) Chewable 1 Tablet(s) Chew three times a day  clopidogrel Tablet 75 milliGRAM(s) Oral daily  dextrose 40% Gel 15 Gram(s) Oral once PRN  dextrose 5%. 1000 milliLiter(s) IV Continuous <Continuous>  dextrose 50% Injectable 12.5 Gram(s) IV Push once  dextrose 50% Injectable 25 Gram(s) IV Push once  dextrose 50% Injectable 25 Gram(s) IV Push once  furosemide    Tablet 80 milliGRAM(s) Oral every 12 hours  glucagon  Injectable 1 milliGRAM(s) IntraMuscular once PRN  insulin glargine Injectable (LANTUS) 12 Unit(s) SubCutaneous at bedtime  insulin lispro (HumaLOG) corrective regimen sliding scale   SubCutaneous three times a day before meals  insulin lispro Injectable (HumaLOG) 6 Unit(s) SubCutaneous three times a day before meals  meclizine 25 milliGRAM(s) Oral three times a day PRN  metolazone Oral Tab/Cap - Peds 2.5 milliGRAM(s) Oral every 48 hours  metoprolol tartrate 50 milliGRAM(s) Oral two times a day  NIFEdipine XL 60 milliGRAM(s) Oral daily  sevelamer carbonate 800 milliGRAM(s) Oral three times a day with meals  simvastatin 40 milliGRAM(s) Oral at bedtime      Lab  Hemoglobin A1C with Mean Plasma Glucose (12.28.19 @ 05:38)    Hemoglobin A1C, Whole Blood: 6.2: Method: Immunoassay         Mean Plasma Glucose: 131 mg/dL        Radiology    < from: CT Angio Neck w/ IV Cont (01.28.20 @ 17:40) >  FINDINGS:     CTA NECK:    Normal origins of the innominate, left common carotid and left subclavian artery off the aortic arch.    Normal contrast filling the bilateral common carotid arteries with normal carotid bifurcations. There is no significant stenosis involving the bilateral internal carotid arteries.      CTA BRAIN:    Normal contrast filling of the middlecerebral arteries and distal MCA branches. Normal filling is noted of the bilateral anterior cerebral arteries and anterior communicating artery.    Normal filling of the bilateral PICAs and AICAs. There is normal filling of the basilar artery, bilateral SCA and PCA vessels.    Normal filling of small bilateral posterior communicating arteries. There is no evidence of aneurysms or stenosis identified at this time.    Normal contrast filling of the bilateral vertebral arteries with codominance.    CT HEAD:    The ventricles and cerebral sulci are age-appropriate.    There is no evidence of acute intracranial hemorrhage, mass effect or midline shift.    Gray-white differentiation is maintained. There are patchy subcortical white matter hypodensities that are nonspecific but typically attributed to chronic small vessel ischemic change.    There is no fracture to the calvarium. Mastoid air cells are well aerated bilaterally. The visualized portions of the sinuses are unremarkable.      IMPRESSION:     No CTA evidence of major vascular stenoses or occlusions.    No CT evidence of acute intracranial pathology.        < end of copied text >

## 2020-01-30 LAB
ALBUMIN SERPL ELPH-MCNC: 3.8 G/DL — SIGNIFICANT CHANGE UP (ref 3.5–5.2)
ALP SERPL-CCNC: 95 U/L — SIGNIFICANT CHANGE UP (ref 30–115)
ALT FLD-CCNC: 5 U/L — SIGNIFICANT CHANGE UP (ref 0–41)
ANION GAP SERPL CALC-SCNC: 14 MMOL/L — SIGNIFICANT CHANGE UP (ref 7–14)
AST SERPL-CCNC: 11 U/L — SIGNIFICANT CHANGE UP (ref 0–41)
BILIRUB SERPL-MCNC: 0.2 MG/DL — SIGNIFICANT CHANGE UP (ref 0.2–1.2)
BUN SERPL-MCNC: 37 MG/DL — HIGH (ref 10–20)
CALCIUM SERPL-MCNC: 8.5 MG/DL — SIGNIFICANT CHANGE UP (ref 8.5–10.1)
CHLORIDE SERPL-SCNC: 95 MMOL/L — LOW (ref 98–110)
CHOLEST SERPL-MCNC: 127 MG/DL — SIGNIFICANT CHANGE UP (ref 100–200)
CO2 SERPL-SCNC: 28 MMOL/L — SIGNIFICANT CHANGE UP (ref 17–32)
CREAT SERPL-MCNC: 4.2 MG/DL — CRITICAL HIGH (ref 0.7–1.5)
ESTIMATED AVERAGE GLUCOSE: 120 MG/DL — HIGH (ref 68–114)
GLUCOSE BLDC GLUCOMTR-MCNC: 112 MG/DL — HIGH (ref 70–99)
GLUCOSE BLDC GLUCOMTR-MCNC: 152 MG/DL — HIGH (ref 70–99)
GLUCOSE BLDC GLUCOMTR-MCNC: 161 MG/DL — HIGH (ref 70–99)
GLUCOSE BLDC GLUCOMTR-MCNC: 212 MG/DL — HIGH (ref 70–99)
GLUCOSE SERPL-MCNC: 105 MG/DL — HIGH (ref 70–99)
HBA1C BLD-MCNC: 5.8 % — HIGH (ref 4–5.6)
HCT VFR BLD CALC: 26.3 % — LOW (ref 42–52)
HDLC SERPL-MCNC: 41 MG/DL — SIGNIFICANT CHANGE UP
HGB BLD-MCNC: 8.5 G/DL — LOW (ref 14–18)
LIPID PNL WITH DIRECT LDL SERPL: 63 MG/DL — SIGNIFICANT CHANGE UP (ref 4–129)
MAGNESIUM SERPL-MCNC: 2.1 MG/DL — SIGNIFICANT CHANGE UP (ref 1.8–2.4)
MCHC RBC-ENTMCNC: 30.4 PG — SIGNIFICANT CHANGE UP (ref 27–31)
MCHC RBC-ENTMCNC: 32.3 G/DL — SIGNIFICANT CHANGE UP (ref 32–37)
MCV RBC AUTO: 93.9 FL — SIGNIFICANT CHANGE UP (ref 80–94)
NRBC # BLD: 0 /100 WBCS — SIGNIFICANT CHANGE UP (ref 0–0)
PHOSPHATE SERPL-MCNC: 5 MG/DL — HIGH (ref 2.1–4.9)
PLATELET # BLD AUTO: 184 K/UL — SIGNIFICANT CHANGE UP (ref 130–400)
POTASSIUM SERPL-MCNC: 4.4 MMOL/L — SIGNIFICANT CHANGE UP (ref 3.5–5)
POTASSIUM SERPL-SCNC: 4.4 MMOL/L — SIGNIFICANT CHANGE UP (ref 3.5–5)
PROT SERPL-MCNC: 6.6 G/DL — SIGNIFICANT CHANGE UP (ref 6–8)
RBC # BLD: 2.8 M/UL — LOW (ref 4.7–6.1)
RBC # FLD: 12.8 % — SIGNIFICANT CHANGE UP (ref 11.5–14.5)
SODIUM SERPL-SCNC: 137 MMOL/L — SIGNIFICANT CHANGE UP (ref 135–146)
TOTAL CHOLESTEROL/HDL RATIO MEASUREMENT: 3.1 RATIO — LOW (ref 4–5.5)
TRIGL SERPL-MCNC: 147 MG/DL — SIGNIFICANT CHANGE UP (ref 10–149)
VIT B12 SERPL-MCNC: 326 PG/ML — SIGNIFICANT CHANGE UP (ref 232–1245)
WBC # BLD: 9.36 K/UL — SIGNIFICANT CHANGE UP (ref 4.8–10.8)
WBC # FLD AUTO: 9.36 K/UL — SIGNIFICANT CHANGE UP (ref 4.8–10.8)

## 2020-01-30 PROCEDURE — 99239 HOSP IP/OBS DSCHRG MGMT >30: CPT

## 2020-01-30 PROCEDURE — 93306 TTE W/DOPPLER COMPLETE: CPT | Mod: 26

## 2020-01-30 PROCEDURE — 99233 SBSQ HOSP IP/OBS HIGH 50: CPT

## 2020-01-30 RX ORDER — INFLUENZA VIRUS VACCINE 15; 15; 15; 15 UG/.5ML; UG/.5ML; UG/.5ML; UG/.5ML
0.5 SUSPENSION INTRAMUSCULAR ONCE
Refills: 0 | Status: DISCONTINUED | OUTPATIENT
Start: 2020-01-30 | End: 2020-01-31

## 2020-01-30 RX ORDER — FUROSEMIDE 40 MG
60 TABLET ORAL EVERY 12 HOURS
Refills: 0 | Status: DISCONTINUED | OUTPATIENT
Start: 2020-01-30 | End: 2020-01-31

## 2020-01-30 RX ORDER — FUROSEMIDE 40 MG
3 TABLET ORAL
Qty: 180 | Refills: 0
Start: 2020-01-30 | End: 2020-02-28

## 2020-01-30 RX ORDER — NIFEDIPINE 30 MG
30 TABLET, EXTENDED RELEASE 24 HR ORAL DAILY
Refills: 0 | Status: DISCONTINUED | OUTPATIENT
Start: 2020-01-30 | End: 2020-01-31

## 2020-01-30 RX ORDER — NIFEDIPINE 30 MG
1 TABLET, EXTENDED RELEASE 24 HR ORAL
Qty: 30 | Refills: 0
Start: 2020-01-30 | End: 2020-02-28

## 2020-01-30 RX ADMIN — Medication 1334 MILLIGRAM(S): at 21:47

## 2020-01-30 RX ADMIN — Medication 60 MILLIGRAM(S): at 21:45

## 2020-01-30 RX ADMIN — Medication 1334 MILLIGRAM(S): at 18:15

## 2020-01-30 RX ADMIN — Medication 5 UNIT(S): at 12:58

## 2020-01-30 RX ADMIN — Medication 1334 MILLIGRAM(S): at 11:55

## 2020-01-30 RX ADMIN — Medication 1 TABLET(S): at 21:47

## 2020-01-30 RX ADMIN — Medication 80 MILLIGRAM(S): at 05:09

## 2020-01-30 RX ADMIN — INSULIN GLARGINE 15 UNIT(S): 100 INJECTION, SOLUTION SUBCUTANEOUS at 22:21

## 2020-01-30 RX ADMIN — Medication 5 UNIT(S): at 09:05

## 2020-01-30 RX ADMIN — SEVELAMER CARBONATE 800 MILLIGRAM(S): 2400 POWDER, FOR SUSPENSION ORAL at 18:15

## 2020-01-30 RX ADMIN — CLOPIDOGREL BISULFATE 75 MILLIGRAM(S): 75 TABLET, FILM COATED ORAL at 11:57

## 2020-01-30 RX ADMIN — Medication 50 MILLIGRAM(S): at 05:12

## 2020-01-30 RX ADMIN — Medication 4: at 12:58

## 2020-01-30 RX ADMIN — Medication 60 MILLIGRAM(S): at 05:10

## 2020-01-30 RX ADMIN — Medication 1 TABLET(S): at 05:09

## 2020-01-30 RX ADMIN — Medication 30 MILLIGRAM(S): at 21:46

## 2020-01-30 RX ADMIN — HEPARIN SODIUM 5000 UNIT(S): 5000 INJECTION INTRAVENOUS; SUBCUTANEOUS at 18:15

## 2020-01-30 RX ADMIN — SIMVASTATIN 40 MILLIGRAM(S): 20 TABLET, FILM COATED ORAL at 21:47

## 2020-01-30 RX ADMIN — SEVELAMER CARBONATE 800 MILLIGRAM(S): 2400 POWDER, FOR SUSPENSION ORAL at 11:55

## 2020-01-30 RX ADMIN — Medication 5 UNIT(S): at 18:12

## 2020-01-30 RX ADMIN — Medication 1334 MILLIGRAM(S): at 09:05

## 2020-01-30 RX ADMIN — Medication 50 MILLIGRAM(S): at 18:18

## 2020-01-30 RX ADMIN — HEPARIN SODIUM 5000 UNIT(S): 5000 INJECTION INTRAVENOUS; SUBCUTANEOUS at 05:10

## 2020-01-30 NOTE — OCCUPATIONAL THERAPY INITIAL EVALUATION ADULT - REHAB POTENTIAL, OT EVAL
good, to achieve stated therapy goals/pt would benefit from outpatient OT for fine motor and LUE mild ataxia.  will follow up once if pt is still admitted to hospital

## 2020-01-30 NOTE — PHYSICAL THERAPY INITIAL EVALUATION ADULT - GENERAL OBSERVATIONS, REHAB EVAL
Pt seen [id number] for a total of 35 minutes. Pt encountered in ED stretcher, No apparent distress, agreeable to PT.

## 2020-01-30 NOTE — CHART NOTE - NSCHARTNOTEFT_GEN_A_CORE
Attending DC note  Pt seen and examined at bedside.  No cp or sob. Ambulated pt around ed unit. did well. PT recc rollator outpt, pt given outpt script for PT  vitals, labs, exam stable  Hospital course as in dc summ  Plan dw pt and agreed to plan  Medically cleared for DC. Med recc completed. Attending DC note  Pt seen and examined at bedside.  No cp or sob. Ambulated pt around ed unit. did well. PT recc rollator outpt, pt given outpt script for PT  vitals, labs, exam stable  Hospital course as in dc summ. Pt also found to be ortho static hypotension. Pt was given fluid bolus, oren stalkins, lasix decreased to 60 mg BID and nifedpine decreased to 30 mg. Follow up outpt.   Plan dw pt and agreed to plan  Medically cleared for DC. Med recc completed.

## 2020-01-30 NOTE — PHYSICAL THERAPY INITIAL EVALUATION ADULT - LEVEL OF INDEPENDENCE: GAIT, REHAB EVAL
Pt ambulated 50ft with no Assistive Device, min assist for steadying. Pt then ambulated 50ft with NBQC, Contact guard and cues to negotiate Assistive Device safety. Pt then ambulated 50ft with rolling walker, and was independent. Pt is most safe with rolling walker and will use it upon D/C home.

## 2020-01-30 NOTE — PROGRESS NOTE ADULT - ATTENDING COMMENTS
Pt seen and examined at bedside independently. No cp or sob. Weakness improved  Vital Signs (24 Hrs):  T(C): 36.6 (01-29-20 @ 08:11), Max: 36.7 (01-28-20 @ 23:36)  HR: 82 (01-29-20 @ 15:30) (73 - 83)  BP: 117/64 (01-29-20 @ 15:30) (117/64 - 139/63)  RR: 18 (01-29-20 @ 15:30) (16 - 19)  SpO2: 100% (01-29-20 @ 08:11) (97% - 100%)  Wt(kg): --  Daily Height in cm: 180.34 (29 Jan 2020 07:31)    Daily     I&O's Summary    29 Jan 2020 07:01  -  29 Jan 2020 16:31  --------------------------------------------------------  IN: 0 mL / OUT: 1000 mL / NET: -1000 mL    PHYSICAL EXAM:  GENERAL: NAD, well-developed  HEAD:  Atraumatic, Normocephalic  EYES: EOMI, PERRLA, conjunctiva and sclera clear  NECK: Supple, No JVD  CHEST/LUNG: Clear to auscultation bilaterally; No wheeze  HEART: Regular rate and rhythm; No murmurs, rubs, or gallops  ABDOMEN: Soft, Nontender, Nondistended; Bowel sounds present  EXTREMITIES:  2+ Peripheral Pulses, No clubbing, cyanosis, or edema  PSYCH: AAOx3  NEUROLOGY: non-focal  SKIN: No rashes or lesions    #Plan as above  MRI done : < from: MR Head No Cont (01.29.20 @ 09:45) >    No acute/early subacute infarction or MRI evidence of blood products.    Mild chronic ischemic changes in the frontoparietal white matter and punctate chronic lacunar infarcts in the right basal ganglia and thalami.    Small punctate focus of low signal on GRE images in the left posterior temporoparietal subcortical white matter which is unchanged compared with prior exam and may represent a chronic micro-hemorrhage.    Large right and small left mastoid effusions, unchanged compared to prior MRI from 2/15/2019.    MRI neg.   Orthostats neg  I agree with the above plan     #Progress Note Handoff  Pending (specify):  Pending PT, will follow up B12 outpt, ortho stats neg, once cleared by PT can go home.   Family discussion: dw pt and agreed to plan   Disposition: Home__x_/SNF___/Other________/Unknown at this time________
Pt seen and examined   above note reviewed   HD yesterday  next tomorrow

## 2020-01-30 NOTE — PROGRESS NOTE ADULT - SUBJECTIVE AND OBJECTIVE BOX
Nephrology progress note    Patient is seen and examined, events over the last 24 h noted .  no new complaints  s/p HD yesterday    Allergies:  No Known Allergies    Hospital Medications:   MEDICATIONS  (STANDING):  calcium acetate 1334 milliGRAM(s) Oral four times a day with meals  calcium carbonate    500 mG (Tums) Chewable 1 Tablet(s) Chew three times a day  clopidogrel Tablet 75 milliGRAM(s) Oral daily  furosemide    Tablet 80 milliGRAM(s) Oral every 12 hours  heparin  Injectable 5000 Unit(s) SubCutaneous every 12 hours  insulin glargine Injectable (LANTUS) 15 Unit(s) SubCutaneous at bedtime  insulin lispro (HumaLOG) corrective regimen sliding scale   SubCutaneous three times a day before meals  insulin lispro Injectable (HumaLOG) 5 Unit(s) SubCutaneous three times a day before meals  metolazone Oral Tab/Cap - Peds 2.5 milliGRAM(s) Oral every 48 hours  metoprolol tartrate 50 milliGRAM(s) Oral two times a day  NIFEdipine XL 60 milliGRAM(s) Oral daily  sevelamer carbonate 800 milliGRAM(s) Oral three times a day with meals  simvastatin 40 milliGRAM(s) Oral at bedtime        VITALS:  T(F): 98.2 (01-30-20 @ 08:08), Max: 99.1 (01-29-20 @ 18:03)  HR: 77 (01-30-20 @ 08:08)  BP: 157/86 (01-30-20 @ 08:08)  RR: 18 (01-30-20 @ 08:08)  SpO2: 99% (01-30-20 @ 08:08)  Wt(kg): --    01-29 @ 07:01  -  01-30 @ 07:00  --------------------------------------------------------  IN: 0 mL / OUT: 1000 mL / NET: -1000 mL          PHYSICAL EXAM:  Constitutional: NAD  HEENT: anicteric sclera, oropharynx clear, MMM  Neck: No JVD  Respiratory: CTAB, no wheezes, rales or rhonchi  Cardiovascular: S1, S2, RRR  Gastrointestinal: BS+, soft, NT/ND  Extremities: No cyanosis or clubbing. No peripheral edema  :  No schneider.   Skin: No rashes    LABS:  01-30    137  |  95<L>  |  37<H>  ----------------------------<  105<H>  4.4   |  28  |  4.2<HH>    Ca    8.5      30 Jan 2020 06:46  Phos  5.0     01-30  Mg     2.1     01-30    TPro  6.6  /  Alb  3.8  /  TBili  0.2  /  DBili      /  AST  11  /  ALT  5   /  AlkPhos  95  01-30                          8.5    9.36  )-----------( 184      ( 30 Jan 2020 06:46 )             26.3       Urine Studies:      RADIOLOGY & ADDITIONAL STUDIES:

## 2020-01-30 NOTE — PHYSICAL THERAPY INITIAL EVALUATION ADULT - ADDITIONAL COMMENTS
Pt reports he was independent with all activities, but just started using a narrow based quad cane recently. Pt has 3 steps to enter.

## 2020-01-30 NOTE — PATIENT PROFILE ADULT - NSPROEDAREADYLEARN_GEN_A_NUR
acuteness of illness

## 2020-01-30 NOTE — PROGRESS NOTE ADULT - ASSESSMENT
58 y/o M with ESRD - HD in hospital for dizziness and gait ataxia.  PMH CVA , CAD, ESRD / HD dependant , DM, HTN, DLD, PAD, CABG    # ESRD - HD: s/p HD yesterday   - expect next HD tomorrow.   - BP noted. keep current   - anemia: Hb noted. check iron stores.   - Ca at goal, hyperphos noted. cont. binders. dietary counseling.   - dizziness / ataxia: neurology following. work up negative so far.    ? d/c planning  if remains admitted, will follow

## 2020-01-31 ENCOUNTER — TRANSCRIPTION ENCOUNTER (OUTPATIENT)
Age: 60
End: 2020-01-31

## 2020-01-31 VITALS — SYSTOLIC BLOOD PRESSURE: 137 MMHG | HEART RATE: 76 BPM | DIASTOLIC BLOOD PRESSURE: 70 MMHG | RESPIRATION RATE: 18 BRPM

## 2020-01-31 LAB
GLUCOSE BLDC GLUCOMTR-MCNC: 181 MG/DL — HIGH (ref 70–99)
GLUCOSE BLDC GLUCOMTR-MCNC: 188 MG/DL — HIGH (ref 70–99)
GLUCOSE BLDC GLUCOMTR-MCNC: 99 MG/DL — SIGNIFICANT CHANGE UP (ref 70–99)

## 2020-01-31 PROCEDURE — 99239 HOSP IP/OBS DSCHRG MGMT >30: CPT

## 2020-01-31 RX ADMIN — Medication 60 MILLIGRAM(S): at 17:06

## 2020-01-31 RX ADMIN — Medication 50 MILLIGRAM(S): at 17:04

## 2020-01-31 RX ADMIN — HEPARIN SODIUM 5000 UNIT(S): 5000 INJECTION INTRAVENOUS; SUBCUTANEOUS at 05:44

## 2020-01-31 RX ADMIN — Medication 2: at 11:59

## 2020-01-31 RX ADMIN — Medication 5 UNIT(S): at 17:02

## 2020-01-31 RX ADMIN — Medication 30 MILLIGRAM(S): at 05:44

## 2020-01-31 RX ADMIN — Medication 60 MILLIGRAM(S): at 05:44

## 2020-01-31 RX ADMIN — CLOPIDOGREL BISULFATE 75 MILLIGRAM(S): 75 TABLET, FILM COATED ORAL at 12:05

## 2020-01-31 RX ADMIN — SEVELAMER CARBONATE 800 MILLIGRAM(S): 2400 POWDER, FOR SUSPENSION ORAL at 17:05

## 2020-01-31 RX ADMIN — Medication 1334 MILLIGRAM(S): at 17:03

## 2020-01-31 RX ADMIN — Medication 1 TABLET(S): at 16:07

## 2020-01-31 RX ADMIN — SEVELAMER CARBONATE 800 MILLIGRAM(S): 2400 POWDER, FOR SUSPENSION ORAL at 12:05

## 2020-01-31 RX ADMIN — Medication 5 UNIT(S): at 11:58

## 2020-01-31 RX ADMIN — Medication 50 MILLIGRAM(S): at 05:44

## 2020-01-31 RX ADMIN — Medication 1 TABLET(S): at 05:45

## 2020-01-31 RX ADMIN — Medication 2: at 17:03

## 2020-01-31 RX ADMIN — Medication 1334 MILLIGRAM(S): at 12:04

## 2020-01-31 NOTE — PROGRESS NOTE ADULT - ASSESSMENT
58 y/o M with ESRD - HD in hospital for dizziness and gait ataxia.  PMH CVA , CAD, ESRD / HD dependant , DM, HTN, DLD, PAD, CABG    # ESRD - HD:  HD today 3 hrs 3 K bath    - BP noted. keep current   - anemia: Hb noted. check iron stores.   - Ca at goal, hyperphos noted. cont. binders. dietary counseling.   - dizziness / ataxia: neurology following. work up negative so far.    d/c home today noted

## 2020-01-31 NOTE — DISCHARGE NOTE NURSING/CASE MANAGEMENT/SOCIAL WORK - PATIENT PORTAL LINK FT
You can access the FollowMyHealth Patient Portal offered by [facility] by registering at the following website: [email or web address]. By joining [employer]’s FollowMyHealth portal, you will also be able to view your health information using other applications (apps) compatible with our system.

## 2020-01-31 NOTE — PROGRESS NOTE ADULT - REASON FOR ADMISSION
Dizziness / Gait ataxia

## 2020-01-31 NOTE — CHART NOTE - NSCHARTNOTEFT_GEN_A_CORE
<<<RESIDENT DISCHARGE NOTE>>>     SCHWABACHER, LAWRENCE  MRN-[id number]    Vital Signs:  T(C): 35.6, Max: 36.4 (01-30 @ 17:17)  T(F): 96.1, Max: 97.6 (01-30 @ 17:17)  HR: 75 (62 - 76)  BP: 135/70 (111/58 - 135/70)  RR: 18 (18 - 19)  SpO2: 100% (100% - 100%)    Physical Exam:  General: Awake, Alert. Not in acute distress.  Heart: Regular rate and rhythm; S1, S2; No murmurs.  Lungs: Clear to auscultation bilaterally.  Abdomen: Soft, nontender, nondistended.  Extremities: No edema in upper or lower extremities.  Neuro: AAOx3, No focal deficits.    TEST RESULTS:  CBC (01-30 @ 06:46)                        Hgb: 8.5    WBC: 9.36  )-----------------( Plts: 184                              Hct: 26.3     Chem (01-30 @ 06:46)  Na: 137  |     Cl: 95     |  BUN: 37  -----------------------------------------< Gluc: 105    K: 4.4   |    HCO3: 28  |  Cr: 4.2    Ca 8.5 (01-30 @ 06:46)  Phos 5.0 (01-30 @ 06:46)  Mg 2.1 (01-30 @ 06:46)    LFTs (01-30 @ 06:46)  TPro 6.6  /  Alb 3.8  TBili 0.2  /  DBili     AST 11  /  ALT 5   /  AlkPhos 95      FINAL DISCHARGE INTERVIEW:    Resident Present: (Name: Ru Tello MD)   RN Present: (Name: Hayde)    DISCHARGE MEDICATION RECONCILIATION    Reviewed with Attending (Name: Dr Birch )    DISPOSITION:     [x] Home,    [] Home with Visiting Nursing Services,   []  SNF/ NH,    [] Acute Rehab (4A),   [] Other (Specify: )

## 2020-01-31 NOTE — PROGRESS NOTE ADULT - SUBJECTIVE AND OBJECTIVE BOX
Nephrology progress note    Patient was seen and examined, events over the last 24 h noted .  HD today    Allergies:  No Known Allergies    Hospital Medications:   MEDICATIONS  (STANDING):  calcium acetate 1334 milliGRAM(s) Oral four times a day with meals  calcium carbonate    500 mG (Tums) Chewable 1 Tablet(s) Chew three times a day  clopidogrel Tablet 75 milliGRAM(s) Oral daily  dextrose 5%. 1000 milliLiter(s) (50 mL/Hr) IV Continuous <Continuous>  dextrose 50% Injectable 12.5 Gram(s) IV Push once  dextrose 50% Injectable 25 Gram(s) IV Push once  dextrose 50% Injectable 25 Gram(s) IV Push once  furosemide    Tablet 60 milliGRAM(s) Oral every 12 hours  heparin  Injectable 5000 Unit(s) SubCutaneous every 12 hours  influenza   Vaccine 0.5 milliLiter(s) IntraMuscular once  insulin glargine Injectable (LANTUS) 15 Unit(s) SubCutaneous at bedtime  insulin lispro (HumaLOG) corrective regimen sliding scale   SubCutaneous three times a day before meals  insulin lispro Injectable (HumaLOG) 5 Unit(s) SubCutaneous three times a day before meals  metolazone Oral Tab/Cap - Peds 2.5 milliGRAM(s) Oral every 48 hours  metoprolol tartrate 50 milliGRAM(s) Oral two times a day  NIFEdipine XL 30 milliGRAM(s) Oral daily  sevelamer carbonate 800 milliGRAM(s) Oral three times a day with meals  simvastatin 40 milliGRAM(s) Oral at bedtime        VITALS:  T(F): 96.1 (01-31-20 @ 05:00), Max: 97.6 (01-30-20 @ 17:17)  HR: 75 (01-31-20 @ 12:35)  BP: 135/70 (01-31-20 @ 12:35)  RR: 18 (01-31-20 @ 12:35)  SpO2: 100% (01-30-20 @ 19:57)  Wt(kg): --    01-29 @ 07:01  -  01-30 @ 07:00  --------------------------------------------------------  IN: 0 mL / OUT: 1000 mL / NET: -1000 mL      Height (cm): 180.3 (01-30 @ 19:57)    PHYSICAL EXAM:  Constitutional: NAD  HEENT: anicteric sclera, oropharynx clear, MMM  Neck: No JVD  Respiratory: CTAB, no wheezes, rales or rhonchi  Cardiovascular: S1, S2, RRR  Gastrointestinal: BS+, soft, NT/ND  Extremities: No cyanosis or clubbing. No peripheral edema  :  No schneider.   Skin: No rashes    LABS:  01-30    137  |  95<L>  |  37<H>  ----------------------------<  105<H>  4.4   |  28  |  4.2<HH>    Ca    8.5      30 Jan 2020 06:46  Phos  5.0     01-30  Mg     2.1     01-30    TPro  6.6  /  Alb  3.8  /  TBili  0.2  /  DBili      /  AST  11  /  ALT  5   /  AlkPhos  95  01-30                          8.5    9.36  )-----------( 184      ( 30 Jan 2020 06:46 )             26.3       Urine Studies:      RADIOLOGY & ADDITIONAL STUDIES:

## 2020-01-31 NOTE — DISCHARGE NOTE NURSING/CASE MANAGEMENT/SOCIAL WORK - NSDCPEPTSTRK_GEN_ALL_CORE
Stroke warning signs and symptoms/Prescribed medications/Call 911 for stroke/Need for follow up after discharge/Stroke education booklet/Risk factors for stroke/Stroke support groups for patients, families, and friends/Signs and symptoms of stroke

## 2020-01-31 NOTE — PROGRESS NOTE ADULT - SUBJECTIVE AND OBJECTIVE BOX
SUBJECTIVE:    Patient is a 59y old Male who presents with a chief complaint of Dizziness / Gait ataxia (30 Jan 2020 11:27)    Currently admitted to medicine with the primary diagnosis of TIA (transient ischemic attack)     Today is hospital day 3d.     PAST MEDICAL & SURGICAL HISTORY  Chronic anemia  [place] (hard of hearing)  OA (osteoarthritis)  Pain in left knee: s/p fall  BPH (benign prostatic hyperplasia)  HLD (hyperlipidemia)  PAD (peripheral artery disease): S/p bypass left leg  Myocardial infarction: 2012  Stented coronary artery: in 2008  Hypertension  Chronic kidney disease (CKD): Stage IV  Transient ischemic attack (TIA): 2017; 2008  Diabetes mellitus  S/P CABG (coronary artery bypass graft): x4  CAD (coronary artery disease)  History of surgery: Left CEA (2017)  Left Pinkie toe Amputation (2014)  CABG x 4 (2012)  Card cath - stent (2008)    H/O arterial bypass of lower limb: Left Lower Extremity (2016)  S/P CABG (coronary artery bypass graft): 2012    ALLERGIES:  No Known Allergies    MEDICATIONS:  STANDING MEDICATIONS  calcium acetate 1334 milliGRAM(s) Oral four times a day with meals  calcium carbonate    500 mG (Tums) Chewable 1 Tablet(s) Chew three times a day  clopidogrel Tablet 75 milliGRAM(s) Oral daily  dextrose 5%. 1000 milliLiter(s) IV Continuous <Continuous>  dextrose 50% Injectable 12.5 Gram(s) IV Push once  dextrose 50% Injectable 25 Gram(s) IV Push once  dextrose 50% Injectable 25 Gram(s) IV Push once  furosemide    Tablet 60 milliGRAM(s) Oral every 12 hours  heparin  Injectable 5000 Unit(s) SubCutaneous every 12 hours  influenza   Vaccine 0.5 milliLiter(s) IntraMuscular once  insulin glargine Injectable (LANTUS) 15 Unit(s) SubCutaneous at bedtime  insulin lispro (HumaLOG) corrective regimen sliding scale   SubCutaneous three times a day before meals  insulin lispro Injectable (HumaLOG) 5 Unit(s) SubCutaneous three times a day before meals  metolazone Oral Tab/Cap - Peds 2.5 milliGRAM(s) Oral every 48 hours  metoprolol tartrate 50 milliGRAM(s) Oral two times a day  NIFEdipine XL 30 milliGRAM(s) Oral daily  sevelamer carbonate 800 milliGRAM(s) Oral three times a day with meals  simvastatin 40 milliGRAM(s) Oral at bedtime    PRN MEDICATIONS  dextrose 40% Gel 15 Gram(s) Oral once PRN  glucagon  Injectable 1 milliGRAM(s) IntraMuscular once PRN  meclizine 25 milliGRAM(s) Oral three times a day PRN    VITALS:   T(F): 96.1  HR: 76  BP: 132/60  RR: 18  SpO2: 100%    LABS:                        8.5    9.36  )-----------( 184      ( 30 Jan 2020 06:46 )             26.3     01-30    137  |  95<L>  |  37<H>  ----------------------------<  105<H>  4.4   |  28  |  4.2<HH>    Ca    8.5      30 Jan 2020 06:46  Phos  5.0     01-30  Mg     2.1     01-30    TPro  6.6  /  Alb  3.8  /  TBili  0.2  /  DBili  x   /  AST  11  /  ALT  5   /  AlkPhos  95  01-30                  RADIOLOGY:    PHYSICAL EXAM:  GEN: No acute distress  LUNGS: Clear to auscultation bilaterally   HEART: S1/S2 present. RRR.   ABD/ GI: Soft, non-tender, non-distended. Bowel sounds present  EXT: NC/NC/NE/2+PP/MARQUES  NEURO: AAOX3

## 2020-01-31 NOTE — PROGRESS NOTE ADULT - ASSESSMENT
59 year old gentleman with a past medical history of CVA , CAD, ESRD / HD dependant , DM, HTN, DLD, PAD, CABG reports waking up with dizziness for couple of hours along with  gait ataxia. Patient presented to the ED , pt endorsed that room spinning along with dizziness which resolved later but ED staff noticed persistent gait ataxia. On Neuro exam NIHSS 2 for LUE LLE limb ataxia. In the ED pt was evaluated by neurology and underwent CTA / CT head which were negative . On writer's exam pt's symptoms had completely resolved. Pt also found to have troponemia but that is chronic.     #TIA r/o stroke  -Presented with vertigo and limb ataxia. -Physical exam today showed no sensory deficits. Power 5/5 in all 4 extremities. Finger nose abnormal in left UE, Normal on right side. No ataxia  -History of multiple TIAs in the past and CAD, PVD.   -CT head showed no evidence of hemorrhage. CT angio head and neck showed no major vessel stenosis  -MRI brain < from: MR Head No Cont (01.29.20 @ 09:45) >  No acute/early subacute infarction or MRI evidence of blood products.  Mild chronic ischemic changes in the frontoparietal white matter and punctate chronic lacunar infarcts in the right basal ganglia and thalami.  Small punctate focus of low signal on GRE images in the left posterior temporoparietal subcortical white matter which is unchanged compared with prior exam and may represent a chronic microhemorrhage.  Large right and small left mastoid effusions, unchanged compared to prior MRI from 2/15/2019.        -ECHO < from: Transthoracic Echocardiogram (01.30.20 @ 09:35) >   LV Ejection Fraction by Jose's Method with a biplane EF of 50 %.   2. Spectral Doppler shows impaired relaxation pattern of left ventricular myocardial filling (Grade I diastolic dysfunction).   3. Mild mitral valve regurgitation.   4. Thickening and calcification of the anterior and posterior mitral valve leaflets.   5. Color flow doppler and intravenous injection of agitated saline demonstrates the presence of an intact intra atrial septum.      - Hb A1c 5.8and lipid panel normal  -Patient on aspirin and plavix at home  -    #ESRD on HD MWF  -Recently started on HD 3 weeks ago through left A-V fistula  -Electrolytes normal, no signs of volume overload   Continue sevelamer and phoslo  -    #CAD  - continue metoprolol, aspirin and plavix, statin  - troponins stable, denies CP    #HTN  -BP controlled  -continue lasix, metoprolol and nifedipine    #DM  - Continue Insulin glargine and bydureon.  Dc home today--spent more than 30mins.  -

## 2020-02-04 DIAGNOSIS — I95.1 ORTHOSTATIC HYPOTENSION: ICD-10-CM

## 2020-02-04 DIAGNOSIS — Z86.73 PERSONAL HISTORY OF TRANSIENT ISCHEMIC ATTACK (TIA), AND CEREBRAL INFARCTION WITHOUT RESIDUAL DEFICITS: ICD-10-CM

## 2020-02-04 DIAGNOSIS — Z79.82 LONG TERM (CURRENT) USE OF ASPIRIN: ICD-10-CM

## 2020-02-04 DIAGNOSIS — I25.10 ATHEROSCLEROTIC HEART DISEASE OF NATIVE CORONARY ARTERY WITHOUT ANGINA PECTORIS: ICD-10-CM

## 2020-02-04 DIAGNOSIS — N40.0 BENIGN PROSTATIC HYPERPLASIA WITHOUT LOWER URINARY TRACT SYMPTOMS: ICD-10-CM

## 2020-02-04 DIAGNOSIS — E11.22 TYPE 2 DIABETES MELLITUS WITH DIABETIC CHRONIC KIDNEY DISEASE: ICD-10-CM

## 2020-02-04 DIAGNOSIS — I12.0 HYPERTENSIVE CHRONIC KIDNEY DISEASE WITH STAGE 5 CHRONIC KIDNEY DISEASE OR END STAGE RENAL DISEASE: ICD-10-CM

## 2020-02-04 DIAGNOSIS — Z95.5 PRESENCE OF CORONARY ANGIOPLASTY IMPLANT AND GRAFT: ICD-10-CM

## 2020-02-04 DIAGNOSIS — N18.6 END STAGE RENAL DISEASE: ICD-10-CM

## 2020-02-04 DIAGNOSIS — Z89.422 ACQUIRED ABSENCE OF OTHER LEFT TOE(S): ICD-10-CM

## 2020-02-04 DIAGNOSIS — E11.51 TYPE 2 DIABETES MELLITUS WITH DIABETIC PERIPHERAL ANGIOPATHY WITHOUT GANGRENE: ICD-10-CM

## 2020-02-04 DIAGNOSIS — E87.5 HYPERKALEMIA: ICD-10-CM

## 2020-02-04 DIAGNOSIS — Z79.02 LONG TERM (CURRENT) USE OF ANTITHROMBOTICS/ANTIPLATELETS: ICD-10-CM

## 2020-02-04 DIAGNOSIS — I25.2 OLD MYOCARDIAL INFARCTION: ICD-10-CM

## 2020-02-04 DIAGNOSIS — E78.5 HYPERLIPIDEMIA, UNSPECIFIED: ICD-10-CM

## 2020-02-04 DIAGNOSIS — H91.8X9 OTHER SPECIFIED HEARING LOSS, UNSPECIFIED EAR: ICD-10-CM

## 2020-02-04 DIAGNOSIS — G45.9 TRANSIENT CEREBRAL ISCHEMIC ATTACK, UNSPECIFIED: ICD-10-CM

## 2020-02-04 DIAGNOSIS — M19.90 UNSPECIFIED OSTEOARTHRITIS, UNSPECIFIED SITE: ICD-10-CM

## 2020-02-04 DIAGNOSIS — R42 DIZZINESS AND GIDDINESS: ICD-10-CM

## 2020-02-04 DIAGNOSIS — Z99.2 DEPENDENCE ON RENAL DIALYSIS: ICD-10-CM

## 2020-02-07 ENCOUNTER — APPOINTMENT (OUTPATIENT)
Dept: NEUROLOGY | Facility: CLINIC | Age: 60
End: 2020-02-07
Payer: COMMERCIAL

## 2020-02-07 VITALS
HEART RATE: 85 BPM | OXYGEN SATURATION: 90 % | WEIGHT: 198 LBS | SYSTOLIC BLOOD PRESSURE: 115 MMHG | DIASTOLIC BLOOD PRESSURE: 73 MMHG | TEMPERATURE: 98.5 F | HEIGHT: 71 IN | BODY MASS INDEX: 27.72 KG/M2

## 2020-02-07 DIAGNOSIS — I63.81 OTHER CEREBRAL INFARCTION DUE TO OCCLUSION OR STENOSIS OF SMALL ARTERY: ICD-10-CM

## 2020-02-07 DIAGNOSIS — R27.0 ATAXIA, UNSPECIFIED: ICD-10-CM

## 2020-02-07 PROCEDURE — 99213 OFFICE O/P EST LOW 20 MIN: CPT

## 2020-02-07 RX ORDER — CHROMIUM 200 MCG
TABLET ORAL
Refills: 0 | Status: DISCONTINUED | COMMUNITY
End: 2020-02-07

## 2020-02-07 RX ORDER — METOPROLOL TARTRATE 50 MG/1
50 TABLET, FILM COATED ORAL
Refills: 0 | Status: ACTIVE | COMMUNITY

## 2020-02-07 RX ORDER — METFORMIN HYDROCHLORIDE 1000 MG/1
1000 TABLET, COATED ORAL
Refills: 0 | Status: DISCONTINUED | COMMUNITY
End: 2020-02-07

## 2020-02-07 RX ORDER — DOXERCALCIFEROL 4 UG/2ML
4 INJECTION, SOLUTION INTRAVENOUS
Refills: 0 | Status: ACTIVE | COMMUNITY

## 2020-02-07 RX ORDER — LOSARTAN POTASSIUM 50 MG/1
50 TABLET, FILM COATED ORAL
Refills: 0 | Status: DISCONTINUED | COMMUNITY
End: 2020-02-07

## 2020-02-07 RX ORDER — INSULIN DETEMIR 100 [IU]/ML
INJECTION, SOLUTION SUBCUTANEOUS
Refills: 0 | Status: DISCONTINUED | COMMUNITY
End: 2020-02-07

## 2020-02-07 RX ORDER — CALCIUM ACETATE 667 MG/1
667 TABLET ORAL
Refills: 0 | Status: ACTIVE | COMMUNITY

## 2020-02-07 RX ORDER — CIPROFLOXACIN HYDROCHLORIDE 500 MG/1
500 TABLET, FILM COATED ORAL
Qty: 10 | Refills: 0 | Status: DISCONTINUED | COMMUNITY
Start: 2019-08-13 | End: 2020-02-07

## 2020-02-07 RX ORDER — ANTACID TABLETS 500 MG/1
500 TABLET, CHEWABLE ORAL
Refills: 0 | Status: ACTIVE | COMMUNITY

## 2020-02-07 RX ORDER — NIFEDIPINE 30 MG/1
30 TABLET, FILM COATED, EXTENDED RELEASE ORAL DAILY
Refills: 0 | Status: ACTIVE | COMMUNITY

## 2020-02-07 RX ORDER — METOLAZONE 2.5 MG/1
2.5 TABLET ORAL
Refills: 0 | Status: ACTIVE | COMMUNITY

## 2020-02-07 RX ORDER — SEVELAMER CARBONATE 800 MG/1
800 TABLET, FILM COATED ORAL
Refills: 0 | Status: ACTIVE | COMMUNITY

## 2020-02-07 RX ORDER — METOPROLOL TARTRATE 25 MG/1
25 TABLET, FILM COATED ORAL
Refills: 0 | Status: DISCONTINUED | COMMUNITY
End: 2020-02-07

## 2020-02-07 RX ORDER — SIMVASTATIN 40 MG/1
40 TABLET, FILM COATED ORAL
Refills: 0 | Status: DISCONTINUED | COMMUNITY
End: 2020-02-07

## 2020-02-07 NOTE — REVIEW OF SYSTEMS
[Fever] : no fever [Chills] : no chills [Chest Pain] : no chest pain [Shortness Of Breath] : no shortness of breath [Skin Lesions] : no skin lesions

## 2020-02-07 NOTE — ASSESSMENT
[FreeTextEntry1] : 60 y/o man with hx stroke in the past followed by neurology on dual antiplatelets and statin with orthostatic hypotension in the setting of recently starting HD. He is feeing better but does still have left sided ataxia. MRI neg for new stroke.\par \par Plan:\par -Continue meds as directed at last visit\par -need 30 day event monitor as directed at last visit\par -MRI C-spine given persistent ataxia\par -Avoid hypotension, hypovolemia\par -PT\par -Call after MRI\par

## 2020-02-07 NOTE — PHYSICAL EXAM
[FreeTextEntry1] : NIHSS:\par LOC 0\par Facial 0\par Gaze 0\par Visual 0\par Motor Arm 0\par Motor Leg 0\par Ataxia 2 left\par Sensory 0\par Language 0\par Dysarthria 0\par Extinction 0\par Total: 2\par \par MRS:2\par \par mild spasticity LUE, LLE\par 2+ b/l UE DTR's\par 1+ b/l patellar and ankle jerk\par \par

## 2020-02-07 NOTE — HISTORY OF PRESENT ILLNESS
[FreeTextEntry1] : 59 year old gentleman with a past medical history of stroke, CAD, CKD, DM, HTN, DLD, PAD, CABG reports waking up with dizziness and gait ataxia. Patient presented to the ED at that time and c/o room spinning dizziness which resolved but ED staff noticed persistent gait ataxia. On Neuro exam NIHSS 2 for LUE LLE limb ataxia.\par \par MRI Brain:\par Diffusion-weighted images show no areas of restricted diffusion. There is a small focus of low signal on GRE images in the left posterior temporoparietal subcortical white matter which is unchanged compared with prior exam which may represent a punctate chronic microhemorrhage. There is no MRI evidence of acute blood products.\par Sagittal midline structures are unremarkable. There is mild cerebral volume loss with prominence of the ventricles and sulci. Is mild confluent increased T2/FLAIR signal abnormality in the frontoparietal periventricular white matter and punctate chronic lacunar infarcts in the right basal ganglia and right thalamus. There is no cerebral edema or mass effect. There is no evidence of a mass lesion. There is no extra-axial collection.\par There are normal T2 flow voids within the major intracranial vasculature.\par There is a large right and small left mastoid effusion, unchanged compared to prior MRI from 2/15/2019. There is bilateral ethmoid mucosal thickening. The regional soft tissues are unremarkable.\par IMPRESSION:\par No acute/early subacute infarction or MRI evidence of blood products.\par Mild chronic ischemic changes in the frontoparietal white matter and punctate chronic lacunar infarcts in the right basal ganglia and thalami.\par Small punctate focus of low signal on GRE images in the left posterior temporoparietal subcortical white matter which is unchanged compared with prior exam and may represent a chronic microhemorrhage.\par Large right and small left mastoid effusions, unchanged compared to prior MRI from 2/15/2019.\par \par CTA H+N:\par CTA NECK:\par \par Normal origins of the innominate, left common carotid and left subclavian artery off the aortic arch.\par \par Normal contrast filling the bilateral common carotid arteries with normal carotid bifurcations. There is no significant stenosis involving the bilateral internal carotid arteries.  \par \par CTA BRAIN:\par Normal contrast filling of the middle cerebral arteries and distal MCA branches. Normal filling is noted of the bilateral anterior cerebral arteries and anterior communicating artery.\par Normal filling of the bilateral PICAs and AICAs. There is normal filling of the basilar artery, bilateral SCA and PCA vessels.\par Normal filling of small bilateral posterior communicating arteries. There is no evidence of aneurysms or stenosis identified at this time.\par Normal contrast filling of the bilateral vertebral arteries with codominance.\par \par EKG: Sinus\par \par Echo:\par  1. LV Ejection Fraction by Jose's Method with a biplane EF of 50 %.\par  2. Spectral Doppler shows impaired relaxation pattern of left ventricular myocardial filling (Grade I diastolic dysfunction).\par  3. Mild mitral valve regurgitation.\par  4. Thickening and calcification of the anterior and posterior mitral valve leaflets.\par  5. Color flow doppler and intravenous injection of agitated saline demonstrates the presence of an intact intra atrial septum.\par \par LDL 63\par A1C 5.8\par \par His orthostatic vitals were (+) in the ER.\par He recently started HD.\par He is feeling better overall but still with some left sided ataxia.\par He reports a fall 3 w ago.\par He does have neck pain

## 2020-02-27 ENCOUNTER — OUTPATIENT (OUTPATIENT)
Dept: OUTPATIENT SERVICES | Facility: HOSPITAL | Age: 60
LOS: 1 days | Discharge: HOME | End: 2020-02-27
Payer: COMMERCIAL

## 2020-02-27 DIAGNOSIS — Z95.1 PRESENCE OF AORTOCORONARY BYPASS GRAFT: Chronic | ICD-10-CM

## 2020-02-27 DIAGNOSIS — Z95.828 PRESENCE OF OTHER VASCULAR IMPLANTS AND GRAFTS: Chronic | ICD-10-CM

## 2020-02-27 DIAGNOSIS — R27.0 ATAXIA, UNSPECIFIED: ICD-10-CM

## 2020-02-27 DIAGNOSIS — Z98.890 OTHER SPECIFIED POSTPROCEDURAL STATES: Chronic | ICD-10-CM

## 2020-02-27 PROCEDURE — 72141 MRI NECK SPINE W/O DYE: CPT | Mod: 26

## 2020-05-19 ENCOUNTER — APPOINTMENT (OUTPATIENT)
Dept: NEUROLOGY | Facility: CLINIC | Age: 60
End: 2020-05-19
Payer: COMMERCIAL

## 2020-05-19 DIAGNOSIS — M54.12 RADICULOPATHY, CERVICAL REGION: ICD-10-CM

## 2020-05-19 PROCEDURE — 99442: CPT

## 2020-06-04 ENCOUNTER — OUTPATIENT (OUTPATIENT)
Dept: OUTPATIENT SERVICES | Facility: HOSPITAL | Age: 60
LOS: 1 days | Discharge: HOME | End: 2020-06-04
Payer: COMMERCIAL

## 2020-06-04 DIAGNOSIS — Z95.828 PRESENCE OF OTHER VASCULAR IMPLANTS AND GRAFTS: Chronic | ICD-10-CM

## 2020-06-04 DIAGNOSIS — Z98.890 OTHER SPECIFIED POSTPROCEDURAL STATES: Chronic | ICD-10-CM

## 2020-06-04 DIAGNOSIS — Z95.1 PRESENCE OF AORTOCORONARY BYPASS GRAFT: Chronic | ICD-10-CM

## 2020-06-04 DIAGNOSIS — Z94.0 KIDNEY TRANSPLANT STATUS: ICD-10-CM

## 2020-06-04 PROCEDURE — 78452 HT MUSCLE IMAGE SPECT MULT: CPT | Mod: 26

## 2020-06-12 ENCOUNTER — APPOINTMENT (OUTPATIENT)
Dept: VASCULAR SURGERY | Facility: CLINIC | Age: 60
End: 2020-06-12
Payer: COMMERCIAL

## 2020-06-12 VITALS — DIASTOLIC BLOOD PRESSURE: 78 MMHG | SYSTOLIC BLOOD PRESSURE: 128 MMHG

## 2020-06-12 PROCEDURE — 93926 LOWER EXTREMITY STUDY: CPT

## 2020-06-12 PROCEDURE — 93880 EXTRACRANIAL BILAT STUDY: CPT

## 2020-06-12 PROCEDURE — 99213 OFFICE O/P EST LOW 20 MIN: CPT

## 2020-06-12 NOTE — CONSULT LETTER
[Dear  ___] : Dear  [unfilled], [Courtesy Letter:] : I had the pleasure of seeing your patient, [unfilled], in my office today. [Please see my note below.] : Please see my note below. [FreeTextEntry2] : Dear Dr. Aidan Pitts,\par Dear Dr. Kory Ivan,\par Dear Dr. Petey Magdaleno,

## 2020-06-12 NOTE — DATA REVIEWED
[FreeTextEntry1] : I performed an arterial duplex which was medically necessary to evaluate for patency of the bypass graft. It shows a patent saphenous vein graft bypass ( left bk popliteal to AT) with flow disturbance in the proximal graft suggestive of > 50% stenosis.\par I performed a carotid duplex which was medically necessary to evaluate for patency of the carotid endarterectomy site. It showed patent R CEA site and <50% left ICA stenosis.\par \par

## 2020-06-12 NOTE — HISTORY OF PRESENT ILLNESS
[FreeTextEntry1] : Mr. Schwabacher is a 60 year-old gentleman with PVD and carotid stenosis who underwent left below knee popliteal to AT artery reverse saphenous vein graft in April 2017 for left foot gangrene and right carotid endarterectomy in June 2017.\par \par He is S/p left leg angiogram and balloon plasty of his left lower extremity bypass graft in March 2019. He has ESRD and underwent creation of left radiocephalic AVF on 10/24/19. He is receiving HD (M/W/F) via AVF without any problems.\par \par He was found to have stenosis of the left lower extremity bypass on duplex in December 2019 and presents for followup.

## 2020-06-12 NOTE — ASSESSMENT
[FreeTextEntry1] : Mr. Schwabacher is a 60 year-old gentleman with PVD and carotid stenosis who underwent left below knee popliteal to AT artery reverse saphenous vein graft in April 2017 for left foot gangrene and right carotid endarterectomy in June 2017. He underwent left radiocephalic AVF creation in October 2019.\par He is S/p left leg angiogram and balloon plasty of his left lower extremity bypass graft in March 2019. He has ESRD and underwent creation of left radiocephalic AVF on 10/24/19. He is receiving HD (M/W/F) via AVF without any problems.\par I performed an arterial duplex which was medically necessary to evaluate for patency of the bypass graft. It shows a patent saphenous vein graft bypass ( left bk popliteal to AT) with flow disturbance in the proximal graft suggestive of > 50% stenosis.\par I performed a carotid duplex which was medically necessary to evaluate for patency of the carotid endarterectomy site. It showed patent R CEA site and <50% left ICA stenosis.\par \par I have offered him a left lower extremity angiogram and he wants to proceed. He is scheduled for the procedure on 6/25/2020.\par

## 2020-06-19 ENCOUNTER — OUTPATIENT (OUTPATIENT)
Dept: OUTPATIENT SERVICES | Facility: HOSPITAL | Age: 60
LOS: 1 days | Discharge: HOME | End: 2020-06-19
Payer: COMMERCIAL

## 2020-06-19 ENCOUNTER — RESULT REVIEW (OUTPATIENT)
Age: 60
End: 2020-06-19

## 2020-06-19 VITALS
WEIGHT: 209.44 LBS | DIASTOLIC BLOOD PRESSURE: 68 MMHG | OXYGEN SATURATION: 100 % | TEMPERATURE: 98 F | RESPIRATION RATE: 20 BRPM | SYSTOLIC BLOOD PRESSURE: 162 MMHG | HEIGHT: 71 IN | HEART RATE: 84 BPM

## 2020-06-19 DIAGNOSIS — I70.213 ATHEROSCLEROSIS OF NATIVE ARTERIES OF EXTREMITIES WITH INTERMITTENT CLAUDICATION, BILATERAL LEGS: ICD-10-CM

## 2020-06-19 DIAGNOSIS — Z95.1 PRESENCE OF AORTOCORONARY BYPASS GRAFT: Chronic | ICD-10-CM

## 2020-06-19 DIAGNOSIS — Z01.818 ENCOUNTER FOR OTHER PREPROCEDURAL EXAMINATION: ICD-10-CM

## 2020-06-19 DIAGNOSIS — Z95.828 PRESENCE OF OTHER VASCULAR IMPLANTS AND GRAFTS: Chronic | ICD-10-CM

## 2020-06-19 DIAGNOSIS — Z98.890 OTHER SPECIFIED POSTPROCEDURAL STATES: Chronic | ICD-10-CM

## 2020-06-19 LAB
A1C WITH ESTIMATED AVERAGE GLUCOSE RESULT: 6.7 % — HIGH (ref 4–5.6)
ALBUMIN SERPL ELPH-MCNC: 4.3 G/DL — SIGNIFICANT CHANGE UP (ref 3.5–5.2)
ALP SERPL-CCNC: 96 U/L — SIGNIFICANT CHANGE UP (ref 30–115)
ALT FLD-CCNC: 8 U/L — SIGNIFICANT CHANGE UP (ref 0–41)
ANION GAP SERPL CALC-SCNC: 17 MMOL/L — HIGH (ref 7–14)
APTT BLD: 29.5 SEC — SIGNIFICANT CHANGE UP (ref 27–39.2)
AST SERPL-CCNC: 15 U/L — SIGNIFICANT CHANGE UP (ref 0–41)
BASOPHILS # BLD AUTO: 0.07 K/UL — SIGNIFICANT CHANGE UP (ref 0–0.2)
BASOPHILS NFR BLD AUTO: 0.9 % — SIGNIFICANT CHANGE UP (ref 0–1)
BILIRUB SERPL-MCNC: <0.2 MG/DL — SIGNIFICANT CHANGE UP (ref 0.2–1.2)
BUN SERPL-MCNC: 46 MG/DL — HIGH (ref 10–20)
CALCIUM SERPL-MCNC: 8.6 MG/DL — SIGNIFICANT CHANGE UP (ref 8.5–10.1)
CHLORIDE SERPL-SCNC: 99 MMOL/L — SIGNIFICANT CHANGE UP (ref 98–110)
CO2 SERPL-SCNC: 28 MMOL/L — SIGNIFICANT CHANGE UP (ref 17–32)
CREAT SERPL-MCNC: 6.2 MG/DL — CRITICAL HIGH (ref 0.7–1.5)
EOSINOPHIL # BLD AUTO: 0.16 K/UL — SIGNIFICANT CHANGE UP (ref 0–0.7)
EOSINOPHIL NFR BLD AUTO: 2.1 % — SIGNIFICANT CHANGE UP (ref 0–8)
ESTIMATED AVERAGE GLUCOSE: 146 MG/DL — HIGH (ref 68–114)
GLUCOSE SERPL-MCNC: 112 MG/DL — HIGH (ref 70–99)
HCT VFR BLD CALC: 30.4 % — LOW (ref 42–52)
HGB BLD-MCNC: 10.1 G/DL — LOW (ref 14–18)
IMM GRANULOCYTES NFR BLD AUTO: 0.3 % — SIGNIFICANT CHANGE UP (ref 0.1–0.3)
INR BLD: 0.98 RATIO — SIGNIFICANT CHANGE UP (ref 0.65–1.3)
LYMPHOCYTES # BLD AUTO: 1.62 K/UL — SIGNIFICANT CHANGE UP (ref 1.2–3.4)
LYMPHOCYTES # BLD AUTO: 21.6 % — SIGNIFICANT CHANGE UP (ref 20.5–51.1)
MCHC RBC-ENTMCNC: 31.9 PG — HIGH (ref 27–31)
MCHC RBC-ENTMCNC: 33.2 G/DL — SIGNIFICANT CHANGE UP (ref 32–37)
MCV RBC AUTO: 95.9 FL — HIGH (ref 80–94)
MONOCYTES # BLD AUTO: 0.71 K/UL — HIGH (ref 0.1–0.6)
MONOCYTES NFR BLD AUTO: 9.5 % — HIGH (ref 1.7–9.3)
NEUTROPHILS # BLD AUTO: 4.91 K/UL — SIGNIFICANT CHANGE UP (ref 1.4–6.5)
NEUTROPHILS NFR BLD AUTO: 65.6 % — SIGNIFICANT CHANGE UP (ref 42.2–75.2)
NRBC # BLD: 0 /100 WBCS — SIGNIFICANT CHANGE UP (ref 0–0)
PLATELET # BLD AUTO: 163 K/UL — SIGNIFICANT CHANGE UP (ref 130–400)
POTASSIUM SERPL-MCNC: 4 MMOL/L — SIGNIFICANT CHANGE UP (ref 3.5–5)
POTASSIUM SERPL-SCNC: 4 MMOL/L — SIGNIFICANT CHANGE UP (ref 3.5–5)
PROT SERPL-MCNC: 7.5 G/DL — SIGNIFICANT CHANGE UP (ref 6–8)
PROTHROM AB SERPL-ACNC: 11.3 SEC — SIGNIFICANT CHANGE UP (ref 9.95–12.87)
RBC # BLD: 3.17 M/UL — LOW (ref 4.7–6.1)
RBC # FLD: 13.2 % — SIGNIFICANT CHANGE UP (ref 11.5–14.5)
SODIUM SERPL-SCNC: 144 MMOL/L — SIGNIFICANT CHANGE UP (ref 135–146)
WBC # BLD: 7.49 K/UL — SIGNIFICANT CHANGE UP (ref 4.8–10.8)
WBC # FLD AUTO: 7.49 K/UL — SIGNIFICANT CHANGE UP (ref 4.8–10.8)

## 2020-06-19 PROCEDURE — 93010 ELECTROCARDIOGRAM REPORT: CPT

## 2020-06-19 PROCEDURE — 71046 X-RAY EXAM CHEST 2 VIEWS: CPT | Mod: 26

## 2020-06-19 NOTE — H&P PST ADULT - HISTORY OF PRESENT ILLNESS
61 y/o male scheduled for left lower extremity angio possible  endovascular revascularization  reports no c/o cp,sob,palpitations,cough or dysuria  1-2 fos without sob

## 2020-06-19 NOTE — H&P PST ADULT - NSICDXPASTMEDICALHX_GEN_ALL_CORE_FT
PAST MEDICAL HISTORY:  BPH (benign prostatic hyperplasia)     CAD (coronary artery disease)     Chronic anemia     Chronic kidney disease (CKD) Stage IV    Diabetes mellitus     HLD (hyperlipidemia)     [place] (hard of hearing)     Hypertension     Myocardial infarction 2012    OA (osteoarthritis)     PAD (peripheral artery disease) S/p bypass left leg    Pain in left knee s/p fall    S/P CABG (coronary artery bypass graft) x4    Stented coronary artery in 2008    Transient ischemic attack (TIA) 2017; 2008

## 2020-06-23 ENCOUNTER — OUTPATIENT (OUTPATIENT)
Dept: OUTPATIENT SERVICES | Facility: HOSPITAL | Age: 60
LOS: 1 days | Discharge: HOME | End: 2020-06-23

## 2020-06-23 ENCOUNTER — APPOINTMENT (OUTPATIENT)
Dept: UROLOGY | Facility: CLINIC | Age: 60
End: 2020-06-23

## 2020-06-23 ENCOUNTER — LABORATORY RESULT (OUTPATIENT)
Age: 60
End: 2020-06-23

## 2020-06-23 DIAGNOSIS — Z98.890 OTHER SPECIFIED POSTPROCEDURAL STATES: Chronic | ICD-10-CM

## 2020-06-23 DIAGNOSIS — Z95.1 PRESENCE OF AORTOCORONARY BYPASS GRAFT: Chronic | ICD-10-CM

## 2020-06-23 DIAGNOSIS — Z95.828 PRESENCE OF OTHER VASCULAR IMPLANTS AND GRAFTS: Chronic | ICD-10-CM

## 2020-06-25 ENCOUNTER — OUTPATIENT (OUTPATIENT)
Dept: OUTPATIENT SERVICES | Facility: HOSPITAL | Age: 60
LOS: 1 days | Discharge: HOME | End: 2020-06-25
Payer: COMMERCIAL

## 2020-06-25 ENCOUNTER — APPOINTMENT (OUTPATIENT)
Dept: VASCULAR SURGERY | Facility: HOSPITAL | Age: 60
End: 2020-06-25

## 2020-06-25 VITALS
HEART RATE: 99 BPM | WEIGHT: 205.03 LBS | DIASTOLIC BLOOD PRESSURE: 67 MMHG | TEMPERATURE: 99 F | SYSTOLIC BLOOD PRESSURE: 162 MMHG | RESPIRATION RATE: 18 BRPM | HEIGHT: 71 IN

## 2020-06-25 VITALS
SYSTOLIC BLOOD PRESSURE: 151 MMHG | TEMPERATURE: 97 F | OXYGEN SATURATION: 100 % | HEART RATE: 80 BPM | RESPIRATION RATE: 19 BRPM | DIASTOLIC BLOOD PRESSURE: 81 MMHG

## 2020-06-25 DIAGNOSIS — Z79.82 LONG TERM (CURRENT) USE OF ASPIRIN: ICD-10-CM

## 2020-06-25 DIAGNOSIS — Z79.4 LONG TERM (CURRENT) USE OF INSULIN: ICD-10-CM

## 2020-06-25 DIAGNOSIS — N18.9 CHRONIC KIDNEY DISEASE, UNSPECIFIED: ICD-10-CM

## 2020-06-25 DIAGNOSIS — Z98.890 OTHER SPECIFIED POSTPROCEDURAL STATES: Chronic | ICD-10-CM

## 2020-06-25 DIAGNOSIS — Z95.828 PRESENCE OF OTHER VASCULAR IMPLANTS AND GRAFTS: Chronic | ICD-10-CM
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
## 2020-06-25 DIAGNOSIS — Z95.1 PRESENCE OF AORTOCORONARY BYPASS GRAFT: Chronic | ICD-10-CM

## 2020-06-25 DIAGNOSIS — I25.10 ATHEROSCLEROTIC HEART DISEASE OF NATIVE CORONARY ARTERY WITHOUT ANGINA PECTORIS: ICD-10-CM

## 2020-06-25 DIAGNOSIS — T82.858A STENOSIS OF OTHER VASCULAR PROSTHETIC DEVICES, IMPLANTS AND GRAFTS, INITIAL ENCOUNTER: ICD-10-CM

## 2020-06-25 DIAGNOSIS — E11.9 TYPE 2 DIABETES MELLITUS WITHOUT COMPLICATIONS: ICD-10-CM

## 2020-06-25 DIAGNOSIS — Z95.5 PRESENCE OF CORONARY ANGIOPLASTY IMPLANT AND GRAFT: ICD-10-CM

## 2020-06-25 DIAGNOSIS — D64.9 ANEMIA, UNSPECIFIED: ICD-10-CM

## 2020-06-25 DIAGNOSIS — I12.9 HYPERTENSIVE CHRONIC KIDNEY DISEASE WITH STAGE 1 THROUGH STAGE 4 CHRONIC KIDNEY DISEASE, OR UNSPECIFIED CHRONIC KIDNEY DISEASE: ICD-10-CM

## 2020-06-25 DIAGNOSIS — Z79.01 LONG TERM (CURRENT) USE OF ANTICOAGULANTS: ICD-10-CM

## 2020-06-25 DIAGNOSIS — I73.9 PERIPHERAL VASCULAR DISEASE, UNSPECIFIED: ICD-10-CM

## 2020-06-25 LAB — GLUCOSE BLDC GLUCOMTR-MCNC: 123 MG/DL — HIGH (ref 70–99)

## 2020-06-25 PROCEDURE — 76937 US GUIDE VASCULAR ACCESS: CPT | Mod: 26

## 2020-06-25 PROCEDURE — 75625 CONTRAST EXAM ABDOMINL AORTA: CPT | Mod: 26

## 2020-06-25 PROCEDURE — 37228: CPT

## 2020-06-25 PROCEDURE — 75710 ARTERY X-RAYS ARM/LEG: CPT | Mod: 26,59

## 2020-06-25 PROCEDURE — 36247 INS CATH ABD/L-EXT ART 3RD: CPT | Mod: 59

## 2020-06-25 RX ORDER — HYDROMORPHONE HYDROCHLORIDE 2 MG/ML
0.5 INJECTION INTRAMUSCULAR; INTRAVENOUS; SUBCUTANEOUS
Refills: 0 | Status: DISCONTINUED | OUTPATIENT
Start: 2020-06-25 | End: 2020-06-25

## 2020-06-25 RX ORDER — FENTANYL CITRATE 50 UG/ML
25 INJECTION INTRAVENOUS
Refills: 0 | Status: DISCONTINUED | OUTPATIENT
Start: 2020-06-25 | End: 2020-06-25

## 2020-06-25 NOTE — PRE-ANESTHESIA EVALUATION ADULT - NSANTHOSAYNRD_GEN_A_CORE
see escobar  tool/No. ESCOBAR screening performed.  STOP BANG Legend: 0-2 = LOW Risk; 3-4 = INTERMEDIATE Risk; 5-8 = HIGH Risk

## 2020-06-25 NOTE — ASU PREOP CHECKLIST - NSBLOODTRANS_GEN_A_CORE_SIUH
no... V-Y Flap Text: The defect edges were debeveled with a #15 scalpel blade.  Given the location of the defect, shape of the defect and the proximity to free margins a V-Y flap was deemed most appropriate.  Using a sterile surgical marker, an appropriate advancement flap was drawn incorporating the defect and placing the expected incisions within the relaxed skin tension lines where possible.    The area thus outlined was incised deep to adipose tissue with a #15 scalpel blade.  The skin margins were undermined to an appropriate distance in all directions utilizing iris scissors.

## 2020-06-25 NOTE — ASU PREOP CHECKLIST - PATIENT SENT TO
operating room
You can access the FollowMyHealth Patient Portal offered by [facility] by registering at the following website: [email or web address]. By joining [email or web address] FollowMyHealth portal, you will also be able to view your health information using other applications (apps) compatible with our system.

## 2020-06-25 NOTE — ASU DISCHARGE PLAN (ADULT/PEDIATRIC) - CALL YOUR DOCTOR IF YOU HAVE ANY OF THE FOLLOWING:
Fever greater than (need to indicate Fahrenheit or Celsius)/Wound/Surgical Site with redness, or foul smelling discharge or pus/Numbness, tingling, color or temperature change to extremity/Swelling that gets worse/Bleeding that does not stop/Pain not relieved by Medications

## 2020-06-25 NOTE — ASU PATIENT PROFILE, ADULT - PMH
BPH (benign prostatic hyperplasia)    CAD (coronary artery disease)    Chronic anemia    Chronic kidney disease (CKD)  Stage IV  Diabetes mellitus    HLD (hyperlipidemia)    [place] (hard of hearing)    Hypertension    Myocardial infarction  2012  OA (osteoarthritis)    PAD (peripheral artery disease)  S/p bypass left leg  Pain in left knee  s/p fall  S/P CABG (coronary artery bypass graft)  x4  Stented coronary artery  in 2008  Transient ischemic attack (TIA)  2017; 2008

## 2020-07-14 ENCOUNTER — APPOINTMENT (OUTPATIENT)
Dept: VASCULAR SURGERY | Facility: CLINIC | Age: 60
End: 2020-07-14
Payer: COMMERCIAL

## 2020-07-14 PROCEDURE — 99213 OFFICE O/P EST LOW 20 MIN: CPT

## 2020-07-14 PROCEDURE — 93926 LOWER EXTREMITY STUDY: CPT

## 2020-07-14 NOTE — ASSESSMENT
[FreeTextEntry1] : Mr. Schwabacher is a 60 year-old gentleman with PVD and carotid stenosis who underwent left below knee popliteal to AT artery reverse saphenous vein graft in April 2017 for left foot gangrene and right carotid endarterectomy in June 2017. He underwent left radiocephalic AVF creation in October 2019.\par He had stenosis of the left lower extremity bypass graft and underwent angioplasty on 6/25/2020.\par I performed an arterial duplex which was medically necessary to evaluate for patency of the bypass graft. It shows a patent saphenous vein graft bypass ( left bk popliteal to AT).\par I will see him back in six months time for follow up.

## 2020-07-14 NOTE — DATA REVIEWED
[FreeTextEntry1] : I performed an arterial duplex which was medically necessary to evaluate for patency of the bypass graft. It shows a patent saphenous vein graft bypass ( left bk popliteal to AT).\par \par

## 2020-07-14 NOTE — HISTORY OF PRESENT ILLNESS
[FreeTextEntry1] : Mr. Schwabacher is a 60 year-old gentleman with PVD and carotid stenosis who underwent left below knee popliteal to AT artery reverse saphenous vein graft in April 2017 for left foot gangrene and right carotid endarterectomy in June 2017. He underwent balloon plasty of his left lower extremity bypass graft in March 2019. \par He has ESRD and underwent creation of left radiocephalic AVF on 10/24/19. He is receiving HD (M/W/F) via AVF without any problems.\par He was found to have stenosis of the left lower extremity bypass on duplex and underwent angioplasty on 6/25/2020.

## 2020-12-22 LAB
PSA FREE FLD-MCNC: 36 %
PSA FREE SERPL-MCNC: 2.84 NG/ML
PSA SERPL-MCNC: 7.89 NG/ML

## 2020-12-23 ENCOUNTER — APPOINTMENT (OUTPATIENT)
Dept: UROLOGY | Facility: CLINIC | Age: 60
End: 2020-12-23
Payer: MEDICARE

## 2020-12-23 VITALS
HEIGHT: 71 IN | BODY MASS INDEX: 27.72 KG/M2 | SYSTOLIC BLOOD PRESSURE: 104 MMHG | WEIGHT: 198 LBS | TEMPERATURE: 98.9 F | HEART RATE: 82 BPM | DIASTOLIC BLOOD PRESSURE: 62 MMHG

## 2020-12-23 PROCEDURE — 99213 OFFICE O/P EST LOW 20 MIN: CPT

## 2021-01-12 ENCOUNTER — APPOINTMENT (OUTPATIENT)
Dept: VASCULAR SURGERY | Facility: CLINIC | Age: 61
End: 2021-01-12
Payer: COMMERCIAL

## 2021-01-12 DIAGNOSIS — Z98.890 OTHER SPECIFIED POSTPROCEDURAL STATES: ICD-10-CM

## 2021-01-12 PROCEDURE — 93926 LOWER EXTREMITY STUDY: CPT | Mod: LT

## 2021-01-12 PROCEDURE — 99213 OFFICE O/P EST LOW 20 MIN: CPT

## 2021-01-12 PROCEDURE — 93990 DOPPLER FLOW TESTING: CPT

## 2021-01-12 NOTE — DATA REVIEWED
[FreeTextEntry1] : I performed an arterial duplex which was medically necessary to evaluate for patency of the bypass graft. It shows a patent saphenous vein graft bypass ( left bk popliteal to AT).\par \par Radiocephalic AV fistula duplex shows a velocity of 556 - second. The outflow cephalic vein is patent no evidence of hemodynamically significant disease.

## 2021-01-12 NOTE — ASSESSMENT
[FreeTextEntry1] : Mr. Schwabacher is a 60 year-old gentleman with PVD and carotid stenosis who underwent left below knee popliteal to AT artery reverse saphenous vein graft in April 2017 for left foot gangrene and right carotid endarterectomy in June 2017. He underwent left radiocephalic AVF creation in October 2019.\par He had stenosis of the left lower extremity bypass graft and underwent angioplasty on 6/25/2020.\par I performed an arterial duplex which was medically necessary to evaluate for patency of the bypass graft. It shows a patent saphenous vein graft bypass ( left bk popliteal to AT).\par I will see him back in six months time for follow up and a carotid arterial duplex

## 2021-03-12 NOTE — ASU PATIENT PROFILE, ADULT - PROVIDER NOTIFICATION
85yo M with PMHx CAD/stents, AFib on coumadin, HTN, HLD, spinal stenosis, presents for eval s/p fall this morning, presyncope and elevated INR now with melena?     #Fall likely due to orthostatic hypotension secondary to GIB  - Hg 8.7-->7.6   - BUN >100  - less likely cardiac event causing pre syncope, less likely neuro event  - f/u 2D echo, TSH, serial EKG  - s/p 4 U PRBC during the admission   - Hold tamsulosin. avoid bb. hold antihypertensives  - GI eval:     - Monitor CBC    - keep hgb>8    - PPI BID IV  - s/p EGD and colonoscopy on 3/10: +gastritis and a polyp that was removed.       # Elevated INR due to Coumadin Toxicity   - INR 1.05  - s/p Vit k 2x 2.5 + 5 (total 10mg)      # Anemia, normocytic   - hg dropping   - hb 7.8, in 2015 was 14  - possibly d/t CKD but given hb 7.8 will treat as possible GIB for now  - GI following pt: EGD/colonoscopy showed gastritis and a polyp  - Hg continues to drop, need to find the source, will continue to monitor Hg, will follow up with GI    # LEYDI on CKD4   - Cr 1.5, noted in [facility] to be 1.7-2.0 in 2015  - Possibly new baseline due to progression vs pre renal due to GIB? will need PMD records.  - f/u urine lytes      # Leukocytosis   - no sign of infection, no fever, cough, urinary sx's.  - Trend WBC, likely reactive stress induced    #Elevated lactate (resolved)  - LA 2.7  - likely dehydration  - s/p 1L NS in ER, s/p 1Liter in ER  - avoid volume overload     # Afib on Coumadin  - not on rate control  - monitor HR for now as rate is controlled  - hold coumadin  as above.     # CAD s/p PCI w/ 3 stents - Hold Plavix. C/s Dr. Herman.     # Prostate CA - c/w finasteride. hold tamsulosin until orthostatic negative.     # HTN   - BP wnl. Hold Losartan 100mg, HCTZ 25mg. Hold Nifidipine 60mg.     # HLD   - Lipitor for pravastatin     # H/o spinal stenosis   - PT eval. op f.u     # Vit d defeciency   - c/w supplement     # DVT PPx - SCDs, hold all chemical ppx until INR wnl     # Activity - Increase as Tolerated w/ assist      # Dispo - Patient to be discharged when medically optimized.    # Code Status - FULL    Declines

## 2021-05-15 ENCOUNTER — INPATIENT (INPATIENT)
Facility: HOSPITAL | Age: 61
LOS: 1 days | Discharge: HOME | End: 2021-05-17
Attending: INTERNAL MEDICINE | Admitting: INTERNAL MEDICINE
Payer: MEDICARE

## 2021-05-15 VITALS
OXYGEN SATURATION: 85 % | DIASTOLIC BLOOD PRESSURE: 69 MMHG | HEIGHT: 71 IN | SYSTOLIC BLOOD PRESSURE: 148 MMHG | HEART RATE: 85 BPM | TEMPERATURE: 98 F | RESPIRATION RATE: 18 BRPM | WEIGHT: 216.05 LBS

## 2021-05-15 DIAGNOSIS — Z98.890 OTHER SPECIFIED POSTPROCEDURAL STATES: Chronic | ICD-10-CM

## 2021-05-15 DIAGNOSIS — Z95.1 PRESENCE OF AORTOCORONARY BYPASS GRAFT: Chronic | ICD-10-CM

## 2021-05-15 DIAGNOSIS — Z95.828 PRESENCE OF OTHER VASCULAR IMPLANTS AND GRAFTS: Chronic | ICD-10-CM

## 2021-05-15 LAB
ALBUMIN SERPL ELPH-MCNC: 4.1 G/DL — SIGNIFICANT CHANGE UP (ref 3.5–5.2)
ALP SERPL-CCNC: 116 U/L — HIGH (ref 30–115)
ALT FLD-CCNC: 7 U/L — SIGNIFICANT CHANGE UP (ref 0–41)
ANION GAP SERPL CALC-SCNC: 13 MMOL/L — SIGNIFICANT CHANGE UP (ref 7–14)
AST SERPL-CCNC: 13 U/L — SIGNIFICANT CHANGE UP (ref 0–41)
BASOPHILS # BLD AUTO: 0.06 K/UL — SIGNIFICANT CHANGE UP (ref 0–0.2)
BASOPHILS NFR BLD AUTO: 0.8 % — SIGNIFICANT CHANGE UP (ref 0–1)
BILIRUB SERPL-MCNC: <0.2 MG/DL — SIGNIFICANT CHANGE UP (ref 0.2–1.2)
BUN SERPL-MCNC: 39 MG/DL — HIGH (ref 10–20)
CALCIUM SERPL-MCNC: 8.4 MG/DL — LOW (ref 8.5–10.1)
CHLORIDE SERPL-SCNC: 92 MMOL/L — LOW (ref 98–110)
CO2 SERPL-SCNC: 33 MMOL/L — HIGH (ref 17–32)
CREAT SERPL-MCNC: 5.5 MG/DL — CRITICAL HIGH (ref 0.7–1.5)
EOSINOPHIL # BLD AUTO: 0.11 K/UL — SIGNIFICANT CHANGE UP (ref 0–0.7)
EOSINOPHIL NFR BLD AUTO: 1.4 % — SIGNIFICANT CHANGE UP (ref 0–8)
GLUCOSE BLDC GLUCOMTR-MCNC: 171 MG/DL — HIGH (ref 70–99)
GLUCOSE SERPL-MCNC: 145 MG/DL — HIGH (ref 70–99)
HCT VFR BLD CALC: 32.7 % — LOW (ref 42–52)
HGB BLD-MCNC: 10.7 G/DL — LOW (ref 14–18)
IMM GRANULOCYTES NFR BLD AUTO: 0.4 % — HIGH (ref 0.1–0.3)
LYMPHOCYTES # BLD AUTO: 1.17 K/UL — LOW (ref 1.2–3.4)
LYMPHOCYTES # BLD AUTO: 14.6 % — LOW (ref 20.5–51.1)
MCHC RBC-ENTMCNC: 30.6 PG — SIGNIFICANT CHANGE UP (ref 27–31)
MCHC RBC-ENTMCNC: 32.7 G/DL — SIGNIFICANT CHANGE UP (ref 32–37)
MCV RBC AUTO: 93.4 FL — SIGNIFICANT CHANGE UP (ref 80–94)
MONOCYTES # BLD AUTO: 0.59 K/UL — SIGNIFICANT CHANGE UP (ref 0.1–0.6)
MONOCYTES NFR BLD AUTO: 7.4 % — SIGNIFICANT CHANGE UP (ref 1.7–9.3)
NEUTROPHILS # BLD AUTO: 6.03 K/UL — SIGNIFICANT CHANGE UP (ref 1.4–6.5)
NEUTROPHILS NFR BLD AUTO: 75.4 % — HIGH (ref 42.2–75.2)
NRBC # BLD: 0 /100 WBCS — SIGNIFICANT CHANGE UP (ref 0–0)
PLATELET # BLD AUTO: 151 K/UL — SIGNIFICANT CHANGE UP (ref 130–400)
POTASSIUM SERPL-MCNC: 3.7 MMOL/L — SIGNIFICANT CHANGE UP (ref 3.5–5)
POTASSIUM SERPL-SCNC: 3.7 MMOL/L — SIGNIFICANT CHANGE UP (ref 3.5–5)
PROT SERPL-MCNC: 7.4 G/DL — SIGNIFICANT CHANGE UP (ref 6–8)
RBC # BLD: 3.5 M/UL — LOW (ref 4.7–6.1)
RBC # FLD: 13.6 % — SIGNIFICANT CHANGE UP (ref 11.5–14.5)
SARS-COV-2 RNA SPEC QL NAA+PROBE: SIGNIFICANT CHANGE UP
SODIUM SERPL-SCNC: 138 MMOL/L — SIGNIFICANT CHANGE UP (ref 135–146)
WBC # BLD: 7.99 K/UL — SIGNIFICANT CHANGE UP (ref 4.8–10.8)
WBC # FLD AUTO: 7.99 K/UL — SIGNIFICANT CHANGE UP (ref 4.8–10.8)

## 2021-05-15 PROCEDURE — 99285 EMERGENCY DEPT VISIT HI MDM: CPT

## 2021-05-15 PROCEDURE — 93010 ELECTROCARDIOGRAM REPORT: CPT

## 2021-05-15 PROCEDURE — 70496 CT ANGIOGRAPHY HEAD: CPT | Mod: 26,MA

## 2021-05-15 PROCEDURE — 70498 CT ANGIOGRAPHY NECK: CPT | Mod: 26,MA

## 2021-05-15 PROCEDURE — 99223 1ST HOSP IP/OBS HIGH 75: CPT

## 2021-05-15 PROCEDURE — 99222 1ST HOSP IP/OBS MODERATE 55: CPT

## 2021-05-15 PROCEDURE — 70450 CT HEAD/BRAIN W/O DYE: CPT | Mod: 26,59,MA

## 2021-05-15 RX ORDER — SODIUM CHLORIDE 9 MG/ML
1000 INJECTION, SOLUTION INTRAVENOUS
Refills: 0 | Status: DISCONTINUED | OUTPATIENT
Start: 2021-05-15 | End: 2021-05-17

## 2021-05-15 RX ORDER — ASPIRIN/CALCIUM CARB/MAGNESIUM 324 MG
81 TABLET ORAL DAILY
Refills: 0 | Status: DISCONTINUED | OUTPATIENT
Start: 2021-05-15 | End: 2021-05-17

## 2021-05-15 RX ORDER — FUROSEMIDE 40 MG
1 TABLET ORAL
Qty: 0 | Refills: 0 | DISCHARGE

## 2021-05-15 RX ORDER — SEVELAMER CARBONATE 2400 MG/1
800 POWDER, FOR SUSPENSION ORAL
Refills: 0 | Status: DISCONTINUED | OUTPATIENT
Start: 2021-05-15 | End: 2021-05-17

## 2021-05-15 RX ORDER — SIMVASTATIN 20 MG/1
40 TABLET, FILM COATED ORAL AT BEDTIME
Refills: 0 | Status: DISCONTINUED | OUTPATIENT
Start: 2021-05-15 | End: 2021-05-17

## 2021-05-15 RX ORDER — GLUCAGON INJECTION, SOLUTION 0.5 MG/.1ML
1 INJECTION, SOLUTION SUBCUTANEOUS ONCE
Refills: 0 | Status: DISCONTINUED | OUTPATIENT
Start: 2021-05-15 | End: 2021-05-17

## 2021-05-15 RX ORDER — HEPARIN SODIUM 5000 [USP'U]/ML
5000 INJECTION INTRAVENOUS; SUBCUTANEOUS EVERY 12 HOURS
Refills: 0 | Status: DISCONTINUED | OUTPATIENT
Start: 2021-05-15 | End: 2021-05-17

## 2021-05-15 RX ORDER — EXENATIDE 250 UG/ML
2 INJECTION SUBCUTANEOUS
Qty: 0 | Refills: 0 | DISCHARGE

## 2021-05-15 RX ORDER — INSULIN GLARGINE 100 [IU]/ML
30 INJECTION, SOLUTION SUBCUTANEOUS AT BEDTIME
Refills: 0 | Status: DISCONTINUED | OUTPATIENT
Start: 2021-05-15 | End: 2021-05-17

## 2021-05-15 RX ORDER — CLOPIDOGREL BISULFATE 75 MG/1
75 TABLET, FILM COATED ORAL DAILY
Refills: 0 | Status: DISCONTINUED | OUTPATIENT
Start: 2021-05-15 | End: 2021-05-17

## 2021-05-15 RX ORDER — DEXTROSE 50 % IN WATER 50 %
25 SYRINGE (ML) INTRAVENOUS ONCE
Refills: 0 | Status: DISCONTINUED | OUTPATIENT
Start: 2021-05-15 | End: 2021-05-17

## 2021-05-15 RX ORDER — METOPROLOL TARTRATE 50 MG
50 TABLET ORAL
Refills: 0 | Status: DISCONTINUED | OUTPATIENT
Start: 2021-05-15 | End: 2021-05-17

## 2021-05-15 RX ORDER — SEVELAMER CARBONATE 2400 MG/1
0 POWDER, FOR SUSPENSION ORAL
Qty: 0 | Refills: 1 | DISCHARGE

## 2021-05-15 RX ORDER — INSULIN LISPRO 100/ML
8 VIAL (ML) SUBCUTANEOUS
Refills: 0 | Status: DISCONTINUED | OUTPATIENT
Start: 2021-05-15 | End: 2021-05-17

## 2021-05-15 RX ORDER — DEXTROSE 50 % IN WATER 50 %
15 SYRINGE (ML) INTRAVENOUS ONCE
Refills: 0 | Status: DISCONTINUED | OUTPATIENT
Start: 2021-05-15 | End: 2021-05-17

## 2021-05-15 RX ORDER — DEXTROSE 50 % IN WATER 50 %
12.5 SYRINGE (ML) INTRAVENOUS ONCE
Refills: 0 | Status: DISCONTINUED | OUTPATIENT
Start: 2021-05-15 | End: 2021-05-17

## 2021-05-15 RX ORDER — INSULIN LISPRO 100/ML
VIAL (ML) SUBCUTANEOUS
Refills: 0 | Status: DISCONTINUED | OUTPATIENT
Start: 2021-05-15 | End: 2021-05-17

## 2021-05-15 RX ORDER — NIFEDIPINE 30 MG
30 TABLET, EXTENDED RELEASE 24 HR ORAL DAILY
Refills: 0 | Status: DISCONTINUED | OUTPATIENT
Start: 2021-05-15 | End: 2021-05-17

## 2021-05-15 RX ORDER — FUROSEMIDE 40 MG
40 TABLET ORAL DAILY
Refills: 0 | Status: DISCONTINUED | OUTPATIENT
Start: 2021-05-15 | End: 2021-05-17

## 2021-05-15 RX ADMIN — Medication 50 MILLIGRAM(S): at 22:55

## 2021-05-15 RX ADMIN — SIMVASTATIN 40 MILLIGRAM(S): 20 TABLET, FILM COATED ORAL at 22:54

## 2021-05-15 RX ADMIN — INSULIN GLARGINE 30 UNIT(S): 100 INJECTION, SOLUTION SUBCUTANEOUS at 22:53

## 2021-05-15 RX ADMIN — HEPARIN SODIUM 5000 UNIT(S): 5000 INJECTION INTRAVENOUS; SUBCUTANEOUS at 22:54

## 2021-05-15 NOTE — H&P ADULT - NSHPSOCIALHISTORY_GEN_ALL_CORE
Marital Status:  (   )    (   ) Single    (   )    (  )   Lives with: (  ) alone  (  ) children   (  ) spouse   (  ) parents  (  ) other  Recent Travel: No recent travel    Substance Use (street drugs): ( x ) never used  (  ) other:  Tobacco Usage:  ( x  ) never smoked   (   ) former smoker   (   ) current smoker  (     ) pack year  Alcohol Usage: None   Baseline mobility: ataxic gait

## 2021-05-15 NOTE — ED ADULT NURSE NOTE - CHIEF COMPLAINT QUOTE
Patient c/o blurred vision right sided facial droop and feeling of [place] x 1 month with no acute changes in his symptoms in th elast 24 hours

## 2021-05-15 NOTE — ED PROVIDER NOTE - CARE PLAN
Principal Discharge DX:	Vertebral artery stenosis, unspecified laterality  Secondary Diagnosis:	Vision changes

## 2021-05-15 NOTE — CONSULT NOTE ADULT - SUBJECTIVE AND OBJECTIVE BOX
Consult Note Neurology     HPI:    61 year old gentleman with a past medical history of CVA , CAD, ESRD / HD dependant , DM, HTN, DLD, PAD, CABG  pt presents for eval of b/l blurry vision. pt states he noticed this 1 month ago. pt denies inciting factor or event. sx gradually worsened. no ha, n,v. no curtain fall or tunnel vision. pt uses glasses but they do not help with his vision. pt unable to see optho. at triage, pt was noted to have L sided facial droop. pt does not know when that started. pt states he has some balance issues which are about baseline.    PAST MEDICAL & SURGICAL HISTORY:    CAD (coronary artery disease)    S/P CABG (coronary artery bypass graft)  x4    Diabetes mellitus    Transient ischemic attack (TIA)  2017; 2008    Chronic kidney disease (CKD)  Stage IV    Hypertension    Stented coronary artery  in 2008    Myocardial infarction  2012    PAD (peripheral artery disease)  S/p bypass left leg    HLD (hyperlipidemia)    BPH (benign prostatic hyperplasia)    Pain in left knee  s/p fall    OA (osteoarthritis)    [place] (hard of hearing)    Chronic anemia    S/P CABG (coronary artery bypass graft)  2012    H/O arterial bypass of lower limb  Left Lower Extremity (2016)    History of surgery  Left CEA (2017)  Left Pinkie toe Amputation (2014)  CABG x 4 (2012)  Card cath - stent (2008)        Home Medications:  aspirin 81 mg oral tablet: 1 tab(s) orally once a day (25 Jun 2020 07:57)  Bydureon Pen 2 mg subcutaneous injection, extended release: 2 milligram(s) subcutaneous once a week (25 Jun 2020 07:57)  Lasix 80 mg oral tablet: 1 tab(s) orally 2 times a day (25 Jun 2020 07:57)  Levemir 100 units/mL subcutaneous solution: 40 unit(s) subcutaneous once a day (at bedtime). 20 units 10/23/19.  20 units 6/24/20 (25 Jun 2020 07:57)  Metoprolol Tartrate 50 mg oral tablet: 1 tab(s) orally 2 times a day (25 Jun 2020 07:57)  Plavix 75 mg oral tablet: 1 tab(s) orally once a day (25 Jun 2020 07:57)  simvastatin 40 mg oral tablet: 1 tab(s) orally once a day (at bedtime) (25 Jun 2020 07:57)      Vital Signs:     T(F): 97.6 (05-15-21 @ 11:27), Max: 97.6 (05-15-21 @ 11:27)  HR: 85 (05-15-21 @ 11:27) (85 - 85)  BP: 148/69 (05-15-21 @ 11:27) (148/69 - 148/69)  RR: 18 (05-15-21 @ 11:27) (18 - 18)  SpO2: 85% (05-15-21 @ 11:27) (85% - 85%)                        10.7   7.99  )-----------( 151      ( 15 May 2021 12:20 )             32.7     05-15    138  |  92<L>  |  39<H>  ----------------------------<  145<H>  3.7   |  33<H>  |  5.5<HH>    Ca    8.4<L>      15 May 2021 12:20    TPro  7.4  /  Alb  4.1  /  TBili  <0.2  /  DBili  x   /  AST  13  /  ALT  7   /  AlkPhos  116<H>  05-15        LIVER FUNCTIONS - ( 15 May 2021 12:20 )  Alb: 4.1 g/dL / Pro: 7.4 g/dL / ALK PHOS: 116 U/L / ALT: 7 U/L / AST: 13 U/L / GGT: x               Neuro Exam:    Neurological:  Mental Status: Alert and Oriented to person, place, and time, affect appropriate, thought, speech, and language coherent.   Cranial Nerves:  II, III, IV, VI: PERRLA, EOM intact. Arguelles intact by confrontation. No cranial nerve palsy or nystagmus noted.  V:  corneal reflex intact bilaterally  VII: face symmetrical at rest. No facial droop noted with expression. Smile is assymetric but related to which side the jaw is moved   IX and X: no hoarseness, gag intact  XII: no dysarthria  Motor: RUE 5+/LUE 5- Lower extremities LLE 4/5, RLE 5/5.   Sensory: Sensation to light touch intact bilaterally to both upper and lower extremities.   Cerebellar Function: Romberg and pronator drift negative. Ataxia present upon walking        Last CTH:    CT head completed. Pending Official Read.      Consult Note Neurology     HPI:    61 year old gentleman with a past medical history of CVA , CAD, ESRD / HD dependant , DM, HTN, DLD, PAD, CABG  pt presents for eval of b/l blurry vision. pt states he noticed this 1 month ago. pt denies inciting factor or event. sx gradually worsened. no ha, n,v. no curtain fall or tunnel vision. pt uses glasses but they do not help with his vision. pt unable to see optho. at triage, pt was noted to have L sided facial droop. pt does not know when that started. pt states he has some balance issues which are about baseline.    PAST MEDICAL & SURGICAL HISTORY:    CAD (coronary artery disease)    S/P CABG (coronary artery bypass graft)  x4    Diabetes mellitus    Transient ischemic attack (TIA)  2017; 2008    Chronic kidney disease (CKD)  Stage IV    Hypertension    Stented coronary artery  in 2008    Myocardial infarction  2012    PAD (peripheral artery disease)  S/p bypass left leg    HLD (hyperlipidemia)    BPH (benign prostatic hyperplasia)    Pain in left knee  s/p fall    OA (osteoarthritis)    [place] (hard of hearing)    Chronic anemia    S/P CABG (coronary artery bypass graft)  2012    H/O arterial bypass of lower limb  Left Lower Extremity (2016)    History of surgery  Left CEA (2017)  Left Pinkie toe Amputation (2014)  CABG x 4 (2012)  Card cath - stent (2008)        Home Medications:  aspirin 81 mg oral tablet: 1 tab(s) orally once a day (25 Jun 2020 07:57)  Bydureon Pen 2 mg subcutaneous injection, extended release: 2 milligram(s) subcutaneous once a week (25 Jun 2020 07:57)  Lasix 80 mg oral tablet: 1 tab(s) orally 2 times a day (25 Jun 2020 07:57)  Levemir 100 units/mL subcutaneous solution: 40 unit(s) subcutaneous once a day (at bedtime). 20 units 10/23/19.  20 units 6/24/20 (25 Jun 2020 07:57)  Metoprolol Tartrate 50 mg oral tablet: 1 tab(s) orally 2 times a day (25 Jun 2020 07:57)  Plavix 75 mg oral tablet: 1 tab(s) orally once a day (25 Jun 2020 07:57)  simvastatin 40 mg oral tablet: 1 tab(s) orally once a day (at bedtime) (25 Jun 2020 07:57)      Vital Signs:     T(F): 97.6 (05-15-21 @ 11:27), Max: 97.6 (05-15-21 @ 11:27)  HR: 85 (05-15-21 @ 11:27) (85 - 85)  BP: 148/69 (05-15-21 @ 11:27) (148/69 - 148/69)  RR: 18 (05-15-21 @ 11:27) (18 - 18)  SpO2: 85% (05-15-21 @ 11:27) (85% - 85%)                        10.7   7.99  )-----------( 151      ( 15 May 2021 12:20 )             32.7     05-15    138  |  92<L>  |  39<H>  ----------------------------<  145<H>  3.7   |  33<H>  |  5.5<HH>    Ca    8.4<L>      15 May 2021 12:20    TPro  7.4  /  Alb  4.1  /  TBili  <0.2  /  DBili  x   /  AST  13  /  ALT  7   /  AlkPhos  116<H>  05-15        LIVER FUNCTIONS - ( 15 May 2021 12:20 )  Alb: 4.1 g/dL / Pro: 7.4 g/dL / ALK PHOS: 116 U/L / ALT: 7 U/L / AST: 13 U/L / GGT: x               Neuro Exam:    Neurological:  Mental Status: Alert and Oriented to person, place, and time, affect appropriate, thought, speech, and language coherent.   Cranial Nerves:  II, III, IV, VI: PERRLA, EOM intact. Arguelles intact by confrontation. No cranial nerve palsy or nystagmus noted.  Showed picture of himself in mirror and he says his face looks symmetric  V:  corneal reflex intact bilaterally  VII: face symmetrical at rest. No facial droop noted with expression. Smile is assymetric but related to which side the jaw is moved   IX and X: no hoarseness, gag intact  XII: no dysarthria  Motor: RUE 5+/LUE 5- Lower extremities LLE 4/5, RLE 5/5.   Sensory: Sensation to light touch intact bilaterally to both upper and lower extremities.   Cerebellar Function: Romberg and pronator drift negative. Ataxia present upon walking        Last CTH:    CT head completed. Pending Official Read.

## 2021-05-15 NOTE — H&P ADULT - ASSESSMENT
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    Blurred vison + Dizziness likely 2/2 Vertebral Artery Plaque + ICA stenosis  - Patient currently hemodynamically stable, symptoms resolved, NIHSS 2 (at baseline), no evidence of facial assymetry  - CT Head negative for acute intracranial pathology  - CTAngio Head and neck: Severe short segment stenosis at the origin of the left vertebral artery due to calcified and noncalcified atherosclerotic plaque. Mild stenosis of the proximal left internal carotid artery due to calcified and noncalcified atherosclerotic plaque which is increased since prior CTA of 1/28/2020.  - Neurology following: reccs appreciated  - Will consult neuroendovascular for possible intervention   - c/w ASA and Plvix for now, statin  - Q8 neurovascular checks  - avoid hypotension     CAD, s/p staged PCI  CABG 2012  - c/w ASA  - c/w metoprolol     ESRD on HD M/W/F  - K stable  - nephrology consult for HD  - Continue Sevelamer TID  - continue Lasix 40 mg daily   61 year old male with PMH of CVA with left sided weakness and ataxic gait ,CAD s/p PCI in 2008 and CABG in 2012 , ESRD on HD (M/W/F) , DM, HTN, DLD, PAD s/p fem pop, presents to the ED with episode of dizziness this am associated with b/l blurred vision.    Blurred vison + Dizziness likely 2/2 Vertebral Artery Plaque + ICA stenosis  - Patient currently hemodynamically stable, symptoms resolved, NIHSS 2 (at baseline), no evidence of facial asymmetry.  - CT Head negative for acute intracranial pathology  - CTAngio Head and neck: Severe short segment stenosis at the origin of the left vertebral artery due to calcified and noncalcified atherosclerotic plaque. Mild stenosis of the proximal left internal carotid artery due to calcified and noncalcified atherosclerotic plaque which is increased since prior CTA of 1/28/2020.  - Neurology following: reccs appreciated  - Will consult neuroendovascular for possible intervention   - c/w ASA and Plvix for now, statin  - Q8 neurovascular checks  - avoid hypotension     CAD, s/p staged PCI  CABG 2012  - c/w ASA  - c/w metoprolol     ESRD on HD M/W/F  - K stable  - nephrology consult for HD  - Continue Sevelamer TID  - continue Lasix 40 mg daily    DM Type II  - on levamir 40 units HS  -    61 year old male with PMH of CVA with left sided weakness and ataxic gait ,CAD s/p PCI in 2008 and CABG in 2012 , ESRD on HD (M/W/F) , DM, HTN, DLD, PAD s/p fem pop, presents to the ED with episode of dizziness this am associated with b/l blurred vision.    Blurred vison + Dizziness likely 2/2 Vertebral Artery Plaque + ICA stenosis  - Patient currently hemodynamically stable, symptoms resolved, NIHSS 2 (at baseline), no evidence of facial asymmetry.  - CT Head negative for acute intracranial pathology  - CTAngio Head and neck: Severe short segment stenosis at the origin of the left vertebral artery due to calcified and noncalcified atherosclerotic plaque. Mild stenosis of the proximal left internal carotid artery due to calcified and noncalcified atherosclerotic plaque which is increased since prior CTA of 1/28/2020.  - Neurology following: reccs appreciated  - Will consult neuroendovascular for possible intervention   - c/w ASA and Plvix for now, statin  - Q8 neurovascular checks  - avoid hypotension     CAD, s/p staged PCI  CABG 2012  - c/w ASA  - c/w metoprolol     ESRD on HD M/W/F  - K stable  - nephrology consult for HD  - Continue Sevelamer TID  - continue Lasix 40 mg daily    Normocytic Anemia of Chronic Disease  - likely 2/2 ESRD  - hb stable at 10  - no evidence of overt bleeding    DM Type II  - on levamir 40 units HS  - FS AC HS  - will start basal bolus  - A1c    Hypertension  - c/w metoprolol, lasix    DLD  - c/w statin    CODE; full  Diet: renal diet  DVT ppx: heparin  Dispo: acute

## 2021-05-15 NOTE — CONSULT NOTE ADULT - ASSESSMENT
Assessment:    61 year old gentleman with a past medical history of CVA , CAD, ESRD / HD dependant , DM, HTN, DLD, PAD, CABG. Pt presents for eval of b/l blurry vision taht started a month ago. Pt wear glasses.    Neurology consulted for facial droop. Upon examination, facial droop not appreciated. Smile is asymmetric at time but seems to be related to on which side weight of jaw is placed. Left sided weakness and ataxic gait at baseline.     Plan:     - Obtain CTA   - Continue Aspirin   - If CTA without abnormality can discharge home and f/u with outpatient neurology       Caroline Peters NP   ex [id number]  Assessment:    61 year old gentleman with a past medical history of CVA , CAD, ESRD / HD dependant , DM, HTN, DLD, PAD, CABG. Pt presents for eval of b/l blurry vision taht started a month ago. Pt wear glasses.    Neurology consulted for facial droop. Upon examination, facial droop not appreciated. Smile is asymmetric at time but seems to be related to on which side weight of jaw is placed. Left sided weakness and ataxic gait at baseline.     Plan:     - Obtain CTA   - Continue Aspirin, and plavix   - If CTA without abnormality can discharge home and f/u with outpatient neurology       Caroline Peters NP   ex [id number]

## 2021-05-15 NOTE — ED ADULT NURSE NOTE - NSIMPLEMENTINTERV_GEN_ALL_ED
Implemented All Fall with Harm Risk Interventions:  [place] to call system. Call bell, personal items and telephone within reach. Instruct patient to call for assistance. Room bathroom lighting operational. Non-slip footwear when patient is off stretcher. Physically safe environment: no spills, clutter or unnecessary equipment. Stretcher in lowest position, wheels locked, appropriate side rails in place. Provide visual cue, wrist band, yellow gown, etc. Monitor gait and stability. Monitor for mental status changes and reorient to person, place, and time. Review medications for side effects contributing to fall risk. Reinforce activity limits and safety measures with patient and family. Provide visual clues: red socks.

## 2021-05-15 NOTE — H&P ADULT - NSHPLABSRESULTS_GEN_ALL_CORE
10.7   7.99  )-----------( 151      ( 15 May 2021 12:20 )             32.7       05-15    138  |  92<L>  |  39<H>  ----------------------------<  145<H>  3.7   |  33<H>  |  5.5<HH>    Ca    8.4<L>      15 May 2021 12:20    TPro  7.4  /  Alb  4.1  /  TBili  <0.2  /  DBili  x   /  AST  13  /  ALT  7   /  AlkPhos  116<H>  05-15                      Lactate Trend            CAPILLARY BLOOD GLUCOSE

## 2021-05-15 NOTE — H&P ADULT - ATTENDING COMMENTS
c/o dizziness, ataxia, lilly vision impairment  CTA neck: severe stenosis of the proximal left vertebral artery due to noncalcified and calcified atherosclerotic plaque   => ? posterior Stroke  => f/u MRI  EKG: regular HR 77, ? identifiable P, large P at V1 => f/u ECHO   Neuro consult  Vascular consult  Cardio  Nephro for chronic HD  Lipid profile, trop  MRI TTE  cont Aspirin Statin   DVT ppx

## 2021-05-15 NOTE — ED ADULT NURSE NOTE - NS ED NURSE PATIENT LEFT UNIT TIME
Spoke to patient. Received 2 copies of lab results in the mail. Also has not received a response from Neurology regarding referral. Will contact for scheduling.    21:44

## 2021-05-15 NOTE — CONSULT NOTE ADULT - ATTENDING COMMENTS
Patient seen and examined with ACP and agree with above except as noted.  PAtient history from out patient and current presentation reviewed.  CTH reviewed and CTA asked to be ordered.  Exam essentially unchanged form when last seen in 2019.  NIHSS 2, mrankin 2    Plan as above (If CTA shows progression of disease would keep for further workup)

## 2021-05-15 NOTE — ED PROVIDER NOTE - PROGRESS NOTE DETAILS
acute on chronic worsening vision, + facial droop of unknown duration w/o additional fnd  will get basic labs, cth, neuro c/s case d/w sandy, he will eval pt

## 2021-05-15 NOTE — H&P ADULT - NSHPREVIEWOFSYSTEMS_GEN_ALL_CORE
REVIEW OF SYSTEMS:    CONSTITUTIONAL: Dizziness  EYES/ENT: b/l blurred vision  No vertigo or throat pain   NECK: No pain or stiffness  RESPIRATORY: No cough, wheezing, hemoptysis; No shortness of breath  CARDIOVASCULAR: No chest pain or palpitations  GASTROINTESTINAL: No abdominal or epigastric pain. No nausea, vomiting, or hematemesis; No diarrhea or constipation. No melena or hematochezia.  GENITOURINARY: No dysuria, frequency or hematuria  NEUROLOGICAL: No numbness or weakness  SKIN: No itching, rashes

## 2021-05-15 NOTE — H&P ADULT - NSHPPHYSICALEXAM_GEN_ALL_CORE
General: well developed, well nourished, NAD  Neuro: alert and oriented x3, MS 4/5 Left extremity, no facial asymmetry noted, gait ataxic, sensation intact  HEENT: NCAT, EOMI, anicteric, mucosa moist  Respiratory: airway patent, respirations unlabored  CVS: regular rate and rhythm  Abdomen: soft, nontender, nondistended  Extremities: no edema, sensation and movement grossly intact  Skin: warm, dry, appropriate color

## 2021-05-15 NOTE — ED PROVIDER NOTE - PHYSICAL EXAMINATION
vss  gen- NAD, aaox3  Eyes-  eomi, perrl, no conj injection b/l, OD: 20/200, OS 20/70  card-rrr  lungs-ctab, no wheezing or rhonchi  abd-sntnd, no guarding or rebound  neuro- full str/sensation, L facial droop, no forehead involvement otherwise cn ii-xii grossly intact, normal coordination and gait, no nystagmus, no visual field deficits

## 2021-05-15 NOTE — ED PROVIDER NOTE - CLINICAL SUMMARY MEDICAL DECISION MAKING FREE TEXT BOX
cta head/neck w/ new findings compared to jan 2020. will admit for further neuro w/u, neuroendovasc c/s, and optho c/s. pt w/ stable neuro exam, no SOB

## 2021-05-15 NOTE — ED PROVIDER NOTE - OBJECTIVE STATEMENT
61 year old gentleman with a past medical history of CVA , CAD, ESRD / HD dependant , DM, HTN, DLD, PAD, CABG 61 year old gentleman with a past medical history of CVA , CAD, ESRD / HD dependant , DM, HTN, DLD, PAD, CABG  pt presents for eval of b/l blurry vision. pt states he noticed this 1 month ago. pt denies inciting factor or event. sx gradually worsened. no ha, n,v. no curtain fall or tunnel vision. pt uses glasses but they do not help with his vision. pt unable to see optho. at triage, pt was noted to have L sided facial droop. pt does not know when that started. pt states he has some balance issues which are about baseline. 61 year old gentleman with a past medical history of CVA , CAD, ESRD / HD dependant ([place]), DM, HTN, DLD, PAD, CABG  pt presents for eval of b/l blurry vision. pt states he noticed this 1 month ago. pt denies inciting factor or event. sx gradually worsened. no ha, n,v. no curtain fall or tunnel vision. pt uses glasses but they do not help with his vision. pt unable to see optho. at triage, pt was noted to have L sided facial droop. pt does not know when that started. pt states he has some balance issues which are about baseline.

## 2021-05-15 NOTE — H&P ADULT - TIME BILLING
complete bedside patient's assessment, review medical chart, discuss medical plan of care with medical team

## 2021-05-15 NOTE — ED PROVIDER NOTE - NS ED ROS FT
vss  gen- NAD, aaox3  Eyes-  eomi, perrl, no conj injection b/l, OD: 20/200, OS 20/70  card-rrr  lungs-ctab, no wheezing or rhonchi  abd-sntnd, no guarding or rebound  neuro- full str/sensation, L facial droop, no forehead involvement otherwise cn ii-xii grossly intact, normal coordination and gait, no nystagmus, no visual field deficits Constitutional: no fever or rigors  Eyes: no eye redness, pos subacute visual change  ENMT: no ear pain, no throat pain  Card: no chest pain, no palpitations  Pulm: no cough, no shortness of breath  GI: no abdominal pain, nausea or vomiting  : no dysuria or hematuria  MSK: no limitation in range of motion, no neck pain  Skin: no rash, no abrasion  Neuro: no numbness, no weakness, + facial droop  Heme/Onc: no easy bruising, no bleeding tendency   Allergic: no hives, no throat swelling

## 2021-05-15 NOTE — ED ADULT NURSE NOTE - NSFALLRSKASSESASSIST_ED_ALL_ED
[FreeTextEntry1] : \par \par paptiations.  ecg/echo normal.  vitals stable\par -advised if continue may get holter monitor to r/o arrhythmia, but less likely\par \par Annual \par -Advised to get yearly Flu shot -alrdy received \par -Advised Yearly Eye exam with Ophthalmologist\par -Advised Yearly Dental exam\par -Educated of the importance of Healthy diet, such as Mediterranean Diet and Exercise, such as walking >20 minutes a day and increasing gradually as tolerated\par -Educated on the importance of limiting alcohol use to less than 5 drinks per week and refraining from tobacco and drug use.\par \par Overweight\par -Being overweight increases your risk of health conditions such as heart disease, high blood pressure, type 2 diabetes, and certain types of cancer. It can also increase your risk for osteoarthritis, sleep apnea, and other respiratory problems. Aim for a slow, steady weight loss. Even a small amount of weight loss can lower your risk of health problems.\par \par f/u in 1 year \par \par \par  yes

## 2021-05-15 NOTE — H&P ADULT - HISTORY OF PRESENT ILLNESS
61 year old male with PMH of CVA , CAD, ESRD on HD , DM, HTN, DLD, PAD s/p fem pop, CABG reports waking up with dizziness for couple of hours along with  gait ataxia.  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 Patient states that he has been experiencing episodes of blurry vision for over one month now, without any inciting events, and his vision improves after several hours. Vision does not change with his eye glasses. He states that the dizziness is new and episodic as well. Dizziness is not changed with any change in positron or dialysis treatments. In the ED, he was noticed to have right facial droop, however stroke code not activated because patient could not recall when it began. He otherwise denies any new focal deficits recent ill contacts, chest pain, falls, loss of balance or voiding abnormalities. Upon my evaluation, patient states that his blurred vision resolved.     ED course: /69, HR: 85, T: 97.8, SPO2 98% on room air. Neurology consulted in ED, recommended CTH: showed no new intracranial pathology, CTA: Severe short segment stenosis at the origin of the left vertebral artery due to calcified and noncalcified atherosclerotic plaque. Mild stenosis of the proximal left internal carotid artery due to calcified and noncalcified atherosclerotic plaque which is increased since prior CTA of 1/28/2020. NIHSS 3, admitted for neurendovascular eval

## 2021-05-15 NOTE — ED PROVIDER NOTE - TOBACCO USE
Detail Level: Detailed Other (Free Text): continue to monitor Note Text (......Xxx Chief Complaint.): This diagnosis correlates with the use of isotretinoin. Never smoker

## 2021-05-16 LAB
ALBUMIN SERPL ELPH-MCNC: 3.8 G/DL — SIGNIFICANT CHANGE UP (ref 3.5–5.2)
ALP SERPL-CCNC: 104 U/L — SIGNIFICANT CHANGE UP (ref 30–115)
ALT FLD-CCNC: 6 U/L — SIGNIFICANT CHANGE UP (ref 0–41)
ANION GAP SERPL CALC-SCNC: 19 MMOL/L — HIGH (ref 7–14)
AST SERPL-CCNC: 13 U/L — SIGNIFICANT CHANGE UP (ref 0–41)
BASOPHILS # BLD AUTO: 0.06 K/UL — SIGNIFICANT CHANGE UP (ref 0–0.2)
BASOPHILS NFR BLD AUTO: 0.7 % — SIGNIFICANT CHANGE UP (ref 0–1)
BILIRUB SERPL-MCNC: <0.2 MG/DL — SIGNIFICANT CHANGE UP (ref 0.2–1.2)
BUN SERPL-MCNC: 51 MG/DL — HIGH (ref 10–20)
CALCIUM SERPL-MCNC: 8 MG/DL — LOW (ref 8.5–10.1)
CHLORIDE SERPL-SCNC: 91 MMOL/L — LOW (ref 98–110)
CO2 SERPL-SCNC: 26 MMOL/L — SIGNIFICANT CHANGE UP (ref 17–32)
CREAT SERPL-MCNC: 6.7 MG/DL — CRITICAL HIGH (ref 0.7–1.5)
EOSINOPHIL # BLD AUTO: 0.16 K/UL — SIGNIFICANT CHANGE UP (ref 0–0.7)
EOSINOPHIL NFR BLD AUTO: 2 % — SIGNIFICANT CHANGE UP (ref 0–8)
GLUCOSE BLDC GLUCOMTR-MCNC: 102 MG/DL — HIGH (ref 70–99)
GLUCOSE BLDC GLUCOMTR-MCNC: 112 MG/DL — HIGH (ref 70–99)
GLUCOSE BLDC GLUCOMTR-MCNC: 120 MG/DL — HIGH (ref 70–99)
GLUCOSE BLDC GLUCOMTR-MCNC: 174 MG/DL — HIGH (ref 70–99)
GLUCOSE SERPL-MCNC: 122 MG/DL — HIGH (ref 70–99)
HCT VFR BLD CALC: 31.1 % — LOW (ref 42–52)
HGB BLD-MCNC: 10.2 G/DL — LOW (ref 14–18)
IMM GRANULOCYTES NFR BLD AUTO: 0.4 % — HIGH (ref 0.1–0.3)
LYMPHOCYTES # BLD AUTO: 1.43 K/UL — SIGNIFICANT CHANGE UP (ref 1.2–3.4)
LYMPHOCYTES # BLD AUTO: 17.8 % — LOW (ref 20.5–51.1)
MAGNESIUM SERPL-MCNC: 2 MG/DL — SIGNIFICANT CHANGE UP (ref 1.8–2.4)
MCHC RBC-ENTMCNC: 30.2 PG — SIGNIFICANT CHANGE UP (ref 27–31)
MCHC RBC-ENTMCNC: 32.8 G/DL — SIGNIFICANT CHANGE UP (ref 32–37)
MCV RBC AUTO: 92 FL — SIGNIFICANT CHANGE UP (ref 80–94)
MONOCYTES # BLD AUTO: 0.46 K/UL — SIGNIFICANT CHANGE UP (ref 0.1–0.6)
MONOCYTES NFR BLD AUTO: 5.7 % — SIGNIFICANT CHANGE UP (ref 1.7–9.3)
NEUTROPHILS # BLD AUTO: 5.88 K/UL — SIGNIFICANT CHANGE UP (ref 1.4–6.5)
NEUTROPHILS NFR BLD AUTO: 73.4 % — SIGNIFICANT CHANGE UP (ref 42.2–75.2)
NRBC # BLD: 0 /100 WBCS — SIGNIFICANT CHANGE UP (ref 0–0)
PHOSPHATE SERPL-MCNC: 4.3 MG/DL — SIGNIFICANT CHANGE UP (ref 2.1–4.9)
PLATELET # BLD AUTO: 149 K/UL — SIGNIFICANT CHANGE UP (ref 130–400)
POTASSIUM SERPL-MCNC: 3.7 MMOL/L — SIGNIFICANT CHANGE UP (ref 3.5–5)
POTASSIUM SERPL-SCNC: 3.7 MMOL/L — SIGNIFICANT CHANGE UP (ref 3.5–5)
PROT SERPL-MCNC: 6.7 G/DL — SIGNIFICANT CHANGE UP (ref 6–8)
RBC # BLD: 3.38 M/UL — LOW (ref 4.7–6.1)
RBC # FLD: 13.4 % — SIGNIFICANT CHANGE UP (ref 11.5–14.5)
SODIUM SERPL-SCNC: 136 MMOL/L — SIGNIFICANT CHANGE UP (ref 135–146)
WBC # BLD: 8.02 K/UL — SIGNIFICANT CHANGE UP (ref 4.8–10.8)
WBC # FLD AUTO: 8.02 K/UL — SIGNIFICANT CHANGE UP (ref 4.8–10.8)

## 2021-05-16 PROCEDURE — 99233 SBSQ HOSP IP/OBS HIGH 50: CPT

## 2021-05-16 PROCEDURE — 93306 TTE W/DOPPLER COMPLETE: CPT | Mod: 26

## 2021-05-16 PROCEDURE — 70551 MRI BRAIN STEM W/O DYE: CPT | Mod: 26

## 2021-05-16 RX ADMIN — HEPARIN SODIUM 5000 UNIT(S): 5000 INJECTION INTRAVENOUS; SUBCUTANEOUS at 17:03

## 2021-05-16 RX ADMIN — Medication 8 UNIT(S): at 08:02

## 2021-05-16 RX ADMIN — Medication 50 MILLIGRAM(S): at 06:37

## 2021-05-16 RX ADMIN — SEVELAMER CARBONATE 800 MILLIGRAM(S): 2400 POWDER, FOR SUSPENSION ORAL at 11:54

## 2021-05-16 RX ADMIN — SEVELAMER CARBONATE 800 MILLIGRAM(S): 2400 POWDER, FOR SUSPENSION ORAL at 08:03

## 2021-05-16 RX ADMIN — Medication 81 MILLIGRAM(S): at 11:55

## 2021-05-16 RX ADMIN — Medication 8 UNIT(S): at 11:54

## 2021-05-16 RX ADMIN — Medication 8 UNIT(S): at 16:36

## 2021-05-16 RX ADMIN — Medication 50 MILLIGRAM(S): at 17:03

## 2021-05-16 RX ADMIN — Medication 40 MILLIGRAM(S): at 06:38

## 2021-05-16 RX ADMIN — CLOPIDOGREL BISULFATE 75 MILLIGRAM(S): 75 TABLET, FILM COATED ORAL at 11:55

## 2021-05-16 RX ADMIN — INSULIN GLARGINE 30 UNIT(S): 100 INJECTION, SOLUTION SUBCUTANEOUS at 21:47

## 2021-05-16 RX ADMIN — HEPARIN SODIUM 5000 UNIT(S): 5000 INJECTION INTRAVENOUS; SUBCUTANEOUS at 06:37

## 2021-05-16 RX ADMIN — Medication 30 MILLIGRAM(S): at 06:38

## 2021-05-16 RX ADMIN — SIMVASTATIN 40 MILLIGRAM(S): 20 TABLET, FILM COATED ORAL at 21:47

## 2021-05-16 RX ADMIN — SEVELAMER CARBONATE 800 MILLIGRAM(S): 2400 POWDER, FOR SUSPENSION ORAL at 16:37

## 2021-05-16 RX ADMIN — Medication 1 MILLIGRAM(S): at 18:58

## 2021-05-16 NOTE — PROGRESS NOTE ADULT - ASSESSMENT
61 year old man with CVA , CAD, ESRD / HD dependant , DM, HTN, DLD, PAD, CABG. Pt presents for eval of b/l blurry vision . Patient noted to have transient facial weakness. Exam currently shows no focal weakness. CTA head and neck showed moderate bilateral vertebral artery stenosis and slight progression of left carotid stenosis. Recommend to get Brain MRI.   Plan:     -   - Continue Aspirin, and plavix and Lipitor.   - IBrain MRI wo   - Endovascular consult if it shows acute infarct otherwise outpatient follow up.

## 2021-05-16 NOTE — PROGRESS NOTE ADULT - SUBJECTIVE AND OBJECTIVE BOX
Patient is a 61y old  Male who presents with a chief complaint of Blurred vision (16 May 2021 14:00)    INTERVAL HPI/OVERNIGHT EVENTS: Patient was examined and seen at bedside. This morning he is resting comfortably in bed and reports no new issues or overnight events. No complaints, feels better  ROS: Denies CP, SOB, AP, new weakness  All other systems reviewed and are within normal limits.  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 Patient states that he has been experiencing episodes of blurry vision for over one month now, without any inciting events, and his vision improves after several hours. Vision does not change with his eye glasses. He states that the dizziness is new and episodic as well. Dizziness is not changed with any change in positron or dialysis treatments. In the ED, he was noticed to have right facial droop, however stroke code not activated because patient could not recall when it began. He otherwise denies any new focal deficits recent ill contacts, chest pain, falls, loss of balance or voiding abnormalities. Upon my evaluation, patient states that his blurred vision resolved.     ED course: /69, HR: 85, T: 97.8, SPO2 98% on room air. Neurology consulted in ED, recommended CTH: showed no new intracranial pathology, CTA: Severe short segment stenosis at the origin of the left vertebral artery due to calcified and noncalcified atherosclerotic plaque. Mild stenosis of the proximal left internal carotid artery due to calcified and noncalcified atherosclerotic plaque which is increased since prior CTA of 1/28/2020. NIHSS 3, admitted for neurendovascular eval   (15 May 2021 17:26)    PAST MEDICAL & SURGICAL HISTORY:  CAD (coronary artery disease)    S/P CABG (coronary artery bypass graft)  x4    Diabetes mellitus    Transient ischemic attack (TIA)  2017; 2008    Chronic kidney disease (CKD)  Stage IV    Hypertension    Stented coronary artery  in 2008    Myocardial infarction  2012    PAD (peripheral artery disease)  S/p bypass left leg    HLD (hyperlipidemia)    BPH (benign prostatic hyperplasia)    Pain in left knee  s/p fall    OA (osteoarthritis)    [place] (hard of hearing)    Chronic anemia    S/P CABG (coronary artery bypass graft)  2012    H/O arterial bypass of lower limb  Left Lower Extremity (2016)    History of surgery  Left CEA (2017)  Left Pinkie toe Amputation (2014)  CABG x 4 (2012)  Card cath - stent (2008)          General: NAD, AAO3  HEENT:  EOMI, no LAD  CV: S1 S2  Resp: decreased breath sounds at bases  GI: NT/ND/S +BS  MS: no clubbing/cyanosis/edema, + pulses b/l  Neuro: nonfocal, +reflexes thruout    MEDICATIONS  (STANDING):  aspirin  chewable 81 milliGRAM(s) Oral daily  clopidogrel Tablet 75 milliGRAM(s) Oral daily  dextrose 40% Gel 15 Gram(s) Oral once  dextrose 5%. 1000 milliLiter(s) (50 mL/Hr) IV Continuous <Continuous>  dextrose 5%. 1000 milliLiter(s) (100 mL/Hr) IV Continuous <Continuous>  dextrose 50% Injectable 25 Gram(s) IV Push once  dextrose 50% Injectable 12.5 Gram(s) IV Push once  dextrose 50% Injectable 25 Gram(s) IV Push once  furosemide    Tablet 40 milliGRAM(s) Oral daily  glucagon  Injectable 1 milliGRAM(s) IntraMuscular once  heparin   Injectable 5000 Unit(s) SubCutaneous every 12 hours  insulin glargine Injectable (LANTUS) 30 Unit(s) SubCutaneous at bedtime  insulin lispro (ADMELOG) corrective regimen sliding scale   SubCutaneous three times a day before meals  insulin lispro Injectable (ADMELOG) 8 Unit(s) SubCutaneous three times a day before meals  metoprolol tartrate 50 milliGRAM(s) Oral two times a day  NIFEdipine XL 30 milliGRAM(s) Oral daily  sevelamer carbonate 800 milliGRAM(s) Oral three times a day with meals  simvastatin 40 milliGRAM(s) Oral at bedtime    MEDICATIONS  (PRN):    Vital Signs Last 24 Hrs  T(C): 36.3 (16 May 2021 13:03), Max: 36.5 (15 May 2021 20:00)  T(F): 97.3 (16 May 2021 13:03), Max: 97.7 (15 May 2021 20:00)  HR: 71 (16 May 2021 13:03) (71 - 85)  BP: 115/56 (16 May 2021 13:03) (115/56 - 151/70)  BP(mean): 100 (16 May 2021 05:46) (100 - 100)  RR: 18 (16 May 2021 13:03) (18 - 18)  SpO2: 98% (15 May 2021 21:30) (97% - 99%)  CAPILLARY BLOOD GLUCOSE      POCT Blood Glucose.: 102 mg/dL (16 May 2021 11:33)  POCT Blood Glucose.: 120 mg/dL (16 May 2021 07:41)  POCT Blood Glucose.: 171 mg/dL (15 May 2021 21:40)                          10.2   8.02  )-----------( 149      ( 16 May 2021 09:34 )             31.1     05-16    136  |  91<L>  |  51<H>  ----------------------------<  122<H>  3.7   |  26  |  6.7<HH>    Ca    8.0<L>      16 May 2021 09:34  Phos  4.3     05-16  Mg     2.0     05-16    TPro  6.7  /  Alb  3.8  /  TBili  <0.2  /  DBili  x   /  AST  13  /  ALT  6   /  AlkPhos  104  05-16    LIVER FUNCTIONS - ( 16 May 2021 09:34 )  Alb: 3.8 g/dL / Pro: 6.7 g/dL / ALK PHOS: 104 U/L / ALT: 6 U/L / AST: 13 U/L / GGT: x                           Chart, Consultant(s) Notes Reviewed:  [x ] YES  [ ] NO  Care Discussed with Consultants/Other Providers/ Housestaff [ x] YES  [ ] NO  Radiology, labs, old available records personally reviewed.

## 2021-05-16 NOTE — PROGRESS NOTE ADULT - ASSESSMENT
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    Blurred vison + Dizziness   - Patient currently hemodynamically stable, symptoms resolved, , no evidence of facial asymmetry.  - CT Head negative for acute intracranial pathology  - CTAngio Head and neck: Severe short segment stenosis at the origin of the left vertebral artery due to calcified and noncalcified atherosclerotic plaque. Mild stenosis of the proximal left internal carotid artery due to calcified and noncalcified atherosclerotic plaque which is increased since prior CTA of 1/28/2020.  - Neurology following: reccs appreciated  - Will consult neuroendovascular for possible intervention if +MRI as per neuro   - c/w ASA and Plvix for now, statin  - Q8 neurovascular checks  - avoid hypotension     CAD, s/p staged PCI  CABG 2012  - c/w ASA  - c/w metoprolol     ESRD on HD M/W/F  - K stable  - nephrology consult for HD  - Continue Sevelamer TID    Normocytic Anemia of Chronic Disease  - likely 2/2 ESRD  - hb stable at 10  - no evidence of overt bleeding    DM Type II  - on levamir 40 units HS  - FS AC HS  - will start basal bolus  - A1c    Hypertension  - c/w metoprolol    DLD  - c/w statin    CODE; full  Diet: renal diet  DVT ppx: heparin  Dispo: acute    #Progress Note Handoff  Pending: MRI brain_______PT____x____  Pt/Family discussion: Pt informed and agrees with the current plan  Disposition: Home___x___/SNF_______/4A______/To be determined____x____    My note supersedes the residents note should a discrepancy arise.    Chart and consultant notes personally reviewed.  Care Discussed with Consultants/Other Providers/ Housestaff [ x] YES [ ] NO   Radiology, labs, old records personally reviewed.    d/w Housestaff, nursing, case mgmt, neuro

## 2021-05-16 NOTE — PHYSICAL THERAPY INITIAL EVALUATION ADULT - GAIT DISTANCE, PT EVAL
decreased balance with change in direction, decreased heel strike with L foot initial contact/100 feet

## 2021-05-16 NOTE — PHYSICAL THERAPY INITIAL EVALUATION ADULT - GAIT TRAINING, PT EVAL
by discharge: ambulation 150 ft x 2 with NBQC Independently; stairs 1 flight with 1 rail and NBQC with supervision

## 2021-05-16 NOTE — CONSULT NOTE ADULT - ASSESSMENT
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  ·	ESRD on HD MWF / no need for HD today / will reassess in AM   ·	check IP   ·	BP noted / would not decrease it further in the context of neurological symptoms   ·	Blurry vision / vertebral artery stenosis / neuro on case / vascular eval?

## 2021-05-16 NOTE — PHYSICAL THERAPY INITIAL EVALUATION ADULT - PERTINENT HX OF CURRENT PROBLEM, REHAB EVAL
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

## 2021-05-16 NOTE — CONSULT NOTE ADULT - SUBJECTIVE AND OBJECTIVE BOX
NEPHROLOGY CONSULTATION NOTE    THIS CONSULT IS INCOMPLETE / FULL CONSULT TO FOLLOW    Patient is a 61y Male whom presented to the hospital with     PAST MEDICAL & SURGICAL HISTORY:  CAD (coronary artery disease)    S/P CABG (coronary artery bypass graft)  x4    Diabetes mellitus    Transient ischemic attack (TIA)  2017; 2008    Chronic kidney disease (CKD)  Stage IV    Hypertension    Stented coronary artery  in 2008    Myocardial infarction  2012    PAD (peripheral artery disease)  S/p bypass left leg    HLD (hyperlipidemia)    BPH (benign prostatic hyperplasia)    Pain in left knee  s/p fall    OA (osteoarthritis)    [place] (hard of hearing)    Chronic anemia    S/P CABG (coronary artery bypass graft)  2012    H/O arterial bypass of lower limb  Left Lower Extremity (2016)    History of surgery  Left CEA (2017)  Left Pinkie toe Amputation (2014)  CABG x 4 (2012)  Card cath - stent (2008)        Allergies:  No Known Allergies    Home Medications Reviewed  Hospital Medications:   MEDICATIONS  (STANDING):  aspirin  chewable 81 milliGRAM(s) Oral daily  clopidogrel Tablet 75 milliGRAM(s) Oral daily  dextrose 40% Gel 15 Gram(s) Oral once  dextrose 5%. 1000 milliLiter(s) (50 mL/Hr) IV Continuous <Continuous>  dextrose 5%. 1000 milliLiter(s) (100 mL/Hr) IV Continuous <Continuous>  dextrose 50% Injectable 25 Gram(s) IV Push once  dextrose 50% Injectable 12.5 Gram(s) IV Push once  dextrose 50% Injectable 25 Gram(s) IV Push once  furosemide    Tablet 40 milliGRAM(s) Oral daily  glucagon  Injectable 1 milliGRAM(s) IntraMuscular once  heparin   Injectable 5000 Unit(s) SubCutaneous every 12 hours  insulin glargine Injectable (LANTUS) 30 Unit(s) SubCutaneous at bedtime  insulin lispro (ADMELOG) corrective regimen sliding scale   SubCutaneous three times a day before meals  insulin lispro Injectable (ADMELOG) 8 Unit(s) SubCutaneous three times a day before meals  metoprolol tartrate 50 milliGRAM(s) Oral two times a day  NIFEdipine XL 30 milliGRAM(s) Oral daily  sevelamer carbonate 800 milliGRAM(s) Oral three times a day with meals  simvastatin 40 milliGRAM(s) Oral at bedtime      SOCIAL HISTORY:  Denies ETOH,Smoking,   FAMILY HISTORY:  Family history of heart disease (Father)    DM (diabetes mellitus) (Mother)    ESRD (end stage renal disease) on dialysis (Mother)          REVIEW OF SYSTEMS:  CONSTITUTIONAL: No weakness, fevers or chills  EYES/ENT: No visual changes;  No vertigo or throat pain   NECK: No pain or stiffness  RESPIRATORY: No cough, wheezing, hemoptysis; No shortness of breath  CARDIOVASCULAR: No chest pain or palpitations.  GASTROINTESTINAL: No abdominal or epigastric pain. No nausea, vomiting, or hematemesis; No diarrhea or constipation. No melena or hematochezia.  GENITOURINARY: No dysuria, frequency, foamy urine, urinary urgency, incontinence or hematuria  NEUROLOGICAL: No numbness or weakness  SKIN: No itching, burning, rashes, or lesions   VASCULAR: No bilateral lower extremity edema.   All other review of systems is negative unless indicated above.    VITALS:  T(F): 97.4 (05-16-21 @ 05:46), Max: 97.7 (05-15-21 @ 20:00)  HR: 84 (05-16-21 @ 05:46)  BP: 151/70 (05-16-21 @ 05:46)  RR: 18 (05-16-21 @ 05:46)  SpO2: 98% (05-15-21 @ 21:30)    Height (cm): 180.3 (05-15 @ 11:27)  Weight (kg): 98 (05-15 @ 11:27)  BMI (kg/m2): 30.1 (05-15 @ 11:27)  BSA (m2): 2.18 (05-15 @ 11:27)    I&O's Detail        PHYSICAL EXAM:  Constitutional: NAD  HEENT: anicteric sclera, oropharynx clear, MMM  Neck: No JVD  Respiratory: CTAB, no wheezes, rales or rhonchi  Cardiovascular: S1, S2, RRR  Gastrointestinal: BS+, soft, NT/ND  Extremities: No cyanosis or clubbing. No peripheral edema  Neurological: A/O x 3, no focal deficits  Psychiatric: Normal mood, normal affect  : No CVA tenderness. No schneider.   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  History goes back to several weeks ptp when patient began to complain of bilateral eyes blurry vision , denied focal motor sensory deficit no seizure disorder no bowel bladder disturbances .  Patient was admitted for work up     PAST MEDICAL & SURGICAL HISTORY:    CAD (coronary artery disease)  S/P CABG (coronary artery bypass graft) x4  Diabetes mellitus  Transient ischemic attack (TIA) 2017; 2008  Chronic kidney disease (CKD) Stage IV  Hypertension  Myocardial infarction 2012  PAD (peripheral artery disease)  S/p bypass left leg  HLD (hyperlipidemia)  BPH (benign prostatic hyperplasia)  Pain in left knee  s/p fall  OA (osteoarthritis)  [place] (hard of hearing)  Chronic anemia  H/O arterial bypass of lower limb  Left Lower Extremity (2016)  History of surgery  Left Pinkie toe Amputation (2014)  Card cath - stent (2008)        Allergies:  No Known Allergies    Home Medications Reviewed  Hospital Medications:   MEDICATIONS  (STANDING):    aspirin  chewable 81 milliGRAM(s) Oral daily  clopidogrel Tablet 75 milliGRAM(s) Oral daily  dextrose 40% Gel 15 Gram(s) Oral once  dextrose 5%. 1000 milliLiter(s) (50 mL/Hr) IV Continuous <Continuous>  dextrose 5%. 1000 milliLiter(s) (100 mL/Hr) IV Continuous <Continuous>  furosemide    Tablet 40 milliGRAM(s) Oral daily  glucagon  Injectable 1 milliGRAM(s) IntraMuscular once  heparin   Injectable 5000 Unit(s) SubCutaneous every 12 hours  insulin glargine Injectable (LANTUS) 30 Unit(s) SubCutaneous at bedtime  insulin lispro (ADMELOG) corrective regimen sliding scale   SubCutaneous three times a day before meals  insulin lispro Injectable (ADMELOG) 8 Unit(s) SubCutaneous three times a day before meals  metoprolol tartrate 50 milliGRAM(s) Oral two times a day  NIFEdipine XL 30 milliGRAM(s) Oral daily  sevelamer carbonate 800 milliGRAM(s) Oral three times a day with meals  simvastatin 40 milliGRAM(s) Oral at bedtime      SOCIAL HISTORY:    Denies ETOH,Smoking,     FAMILY HISTORY:    Family history of heart disease (Father)  DM (diabetes mellitus) (Mother)  ESRD (end stage renal disease) on dialysis (Mother)          REVIEW OF SYSTEMS:  All other review of systems is negative unless indicated above.    VITALS:  T(F): 97.4 (05-16-21 @ 05:46), Max: 97.7 (05-15-21 @ 20:00)  HR: 84 (05-16-21 @ 05:46)  BP: 151/70 (05-16-21 @ 05:46)  RR: 18 (05-16-21 @ 05:46)  SpO2: 98% (05-15-21 @ 21:30)    Height (cm): 180.3 (05-15 @ 11:27)  Weight (kg): 98 (05-15 @ 11:27)  BMI (kg/m2): 30.1 (05-15 @ 11:27)  BSA (m2): 2.18 (05-15 @ 11:27)    I&O's Detail        PHYSICAL EXAM:  Constitutional: NAD  Neck: No JVD  Respiratory: CTAB,  Cardiovascular: S1, S2, RRR  Gastrointestinal: BS+, soft, NT/ND  Extremities: No cyanosis or clubbing. No peripheral edema  Neurological: A/O x 3, no focal deficits  Psychiatric: Normal mood, normal affect  : No CVA tenderness. No schneider.   Skin: No rashes  Vascular Access:    LABS:  05-16    136  |  91<L>  |  51<H>  ----------------------------<  122<H>  3.7   |  26  |  6.7<HH>    Ca    8.0<L>      16 May 2021 09:34  Phos  4.3     05-16  Mg     2.0     05-16    TPro  6.7  /  Alb  3.8  /  TBili  <0.2  /  DBili  x   /  AST  13  /  ALT  6   /  AlkPhos  104  05-16    05-15    138  |  92<L>  |  39<H>  ----------------------------<  145<H>  3.7   |  33<H>  |  5.5<HH>    Ca    8.4<L>      15 May 2021 12:20    TPro  7.4  /  Alb  4.1  /  TBili  <0.2  /  DBili      /  AST  13  /  ALT  7   /  AlkPhos  116<H>  05-15    Creatinine Trend: 5.5 <--                        10.7   7.99  )-----------( 151      ( 15 May 2021 12:20 )             32.7     Urine Studies:              RADIOLOGY & ADDITIONAL STUDIES:  < from: CT Angio Neck w/ IV Cont (05.15.21 @ 14:50) >  CTA HEAD:  Moderate stenosis of the bilateral V4 segments of the vertebral arteries due to atherosclerotic plaque.    No large vessel occlusion, aneurysm or vessel malformation.    < end of copied text >  < from: CT Angio Neck w/ IV Cont (05.15.21 @ 14:50) >  Severe short segment stenosis at the origin of the left vertebral artery due to calcified and noncalcified atherosclerotic plaque.    Mild stenosis of the proximal left internal carotid artery due to calcified and noncalcified atherosclerotic plaque which is increased since prior CTA of 1/28/2020.    < end of copied text >

## 2021-05-16 NOTE — PHYSICAL THERAPY INITIAL EVALUATION ADULT - GENERAL OBSERVATIONS, REHAB EVAL
11:15-11:36 Pt encountered standing in room in NAD. Agreeable for PT. Pt c/o of blurry vision R eye> L eye. R visual field cut both eyes

## 2021-05-16 NOTE — PROGRESS NOTE ADULT - SUBJECTIVE AND OBJECTIVE BOX
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 Patient states that he has been experiencing episodes of blurry vision for over one month now, without any inciting events, and his vision improves after several hours. Vision does not change with his eye glasses. He states that the dizziness is new and episodic as well. Dizziness is not changed with any change in positron or dialysis treatments. In the ED, he was noticed to have right facial droop, however stroke code not activated because patient could not recall when it began. He otherwise denies any new focal deficits recent ill contacts, chest pain, falls, loss of balance or voiding abnormalities. Upon my evaluation, patient states that his blurred vision resolved.     ED course: /69, HR: 85, T: 97.8, SPO2 98% on room air. Neurology consulted in ED, recommended CTH: showed no new intracranial pathology, CTA: Severe short segment stenosis at the origin of the left vertebral artery due to calcified and noncalcified atherosclerotic plaque. Mild stenosis of the proximal left internal carotid artery due to calcified and noncalcified atherosclerotic plaque which is increased since prior CTA of 1/28/2020. NIHSS 3, admitted for neurendovascular eval   (15 May 2021 17:26)      Interval History -  Patient reports no symptoms today. Blurred vision resolved.       Vital Signs Last 24 Hrs  T(C): 36.3 (16 May 2021 13:03), Max: 36.5 (15 May 2021 20:00)  T(F): 97.3 (16 May 2021 13:03), Max: 97.7 (15 May 2021 20:00)  HR: 71 (16 May 2021 13:03) (71 - 85)  BP: 115/56 (16 May 2021 13:03) (115/56 - 151/70)  BP(mean): 100 (16 May 2021 05:46) (100 - 100)  RR: 18 (16 May 2021 13:03) (18 - 18)  SpO2: 98% (15 May 2021 21:30) (97% - 99%)  ICU Vital Signs Last 24 Hrs  T(C): 36.3 (16 May 2021 13:03), Max: 36.5 (15 May 2021 20:00)  T(F): 97.3 (16 May 2021 13:03), Max: 97.7 (15 May 2021 20:00)  HR: 71 (16 May 2021 13:03) (71 - 85)  BP: 115/56 (16 May 2021 13:03) (115/56 - 151/70)  BP(mean): 100 (16 May 2021 05:46) (100 - 100)  ABP: --  ABP(mean): --  RR: 18 (16 May 2021 13:03) (18 - 18)  SpO2: 98% (15 May 2021 21:30) (97% - 99%)          Neurological Exam:   Mental status: Awake, alert and oriented x3.  Recent and remote memory intact.  Naming, repetition and comprehension intact.  Attention/concentration intact.  No dysarthria, no aphasia.  Fund of knowledge appropriate.    Cranial nerves: Pupils equally round and reactive to light, visual fields full, no nystagmus, extraocular muscles intact, V1 through V3 intact bilaterally and symmetric, face symmetric, hearing intact to finger rub, palate elevation symmetric, tongue was midline  Motor:  Normal bulk and tone, strength 5/5 in bilateral upper and lower extremities.    Sensation: Intact to light touch  Coordination: No dysmetria on finger-to-nose    Medications  aspirin  chewable 81 milliGRAM(s) Oral daily  clopidogrel Tablet 75 milliGRAM(s) Oral daily  dextrose 40% Gel 15 Gram(s) Oral once  dextrose 5%. 1000 milliLiter(s) IV Continuous <Continuous>  dextrose 5%. 1000 milliLiter(s) IV Continuous <Continuous>  dextrose 50% Injectable 25 Gram(s) IV Push once  dextrose 50% Injectable 12.5 Gram(s) IV Push once  dextrose 50% Injectable 25 Gram(s) IV Push once  furosemide    Tablet 40 milliGRAM(s) Oral daily  glucagon  Injectable 1 milliGRAM(s) IntraMuscular once  heparin   Injectable 5000 Unit(s) SubCutaneous every 12 hours  insulin glargine Injectable (LANTUS) 30 Unit(s) SubCutaneous at bedtime  insulin lispro (ADMELOG) corrective regimen sliding scale   SubCutaneous three times a day before meals  insulin lispro Injectable (ADMELOG) 8 Unit(s) SubCutaneous three times a day before meals  metoprolol tartrate 50 milliGRAM(s) Oral two times a day  NIFEdipine XL 30 milliGRAM(s) Oral daily  sevelamer carbonate 800 milliGRAM(s) Oral three times a day with meals  simvastatin 40 milliGRAM(s) Oral at bedtime      Lab                        10.2   8.02  )-----------( 149      ( 16 May 2021 09:34 )             31.1     05-16    136  |  91<L>  |  51<H>  ----------------------------<  122<H>  3.7   |  26  |  6.7<HH>    Ca    8.0<L>      16 May 2021 09:34  Phos  4.3     05-16  Mg     2.0     05-16    TPro  6.7  /  Alb  3.8  /  TBili  <0.2  /  DBili  x   /  AST  13  /  ALT  6   /  AlkPhos  104  05-16    CAPILLARY BLOOD GLUCOSE      POCT Blood Glucose.: 102 mg/dL (16 May 2021 11:33)  POCT Blood Glucose.: 120 mg/dL (16 May 2021 07:41)  POCT Blood Glucose.: 171 mg/dL (15 May 2021 21:40)    LIVER FUNCTIONS - ( 16 May 2021 09:34 )  Alb: 3.8 g/dL / Pro: 6.7 g/dL / ALK PHOS: 104 U/L / ALT: 6 U/L / AST: 13 U/L / GGT: x               CTA head and neck:   IMPRESSION:    CTA HEAD:  Moderate stenosis of the bilateral V4 segments of the vertebral arteries due to atherosclerotic plaque.    No large vessel occlusion, aneurysm or vessel malformation.    CTA NECK:  Severe short segment stenosis at the origin of the left vertebral artery due to calcified and noncalcified atherosclerotic plaque.    Mild stenosis of the proximal left internal carotid artery due to calcified and noncalcified atherosclerotic plaque which is increased since prior CTA of 1/28/2020.

## 2021-05-17 ENCOUNTER — TRANSCRIPTION ENCOUNTER (OUTPATIENT)
Age: 61
End: 2021-05-17

## 2021-05-17 VITALS — HEIGHT: 71 IN | WEIGHT: 216.05 LBS

## 2021-05-17 LAB
A1C WITH ESTIMATED AVERAGE GLUCOSE RESULT: 6.9 % — HIGH (ref 4–5.6)
ALBUMIN SERPL ELPH-MCNC: 3.7 G/DL — SIGNIFICANT CHANGE UP (ref 3.5–5.2)
ALP SERPL-CCNC: 100 U/L — SIGNIFICANT CHANGE UP (ref 30–115)
ALT FLD-CCNC: <5 U/L — SIGNIFICANT CHANGE UP (ref 0–41)
ANION GAP SERPL CALC-SCNC: 20 MMOL/L — HIGH (ref 7–14)
AST SERPL-CCNC: 10 U/L — SIGNIFICANT CHANGE UP (ref 0–41)
BASOPHILS # BLD AUTO: 0.07 K/UL — SIGNIFICANT CHANGE UP (ref 0–0.2)
BASOPHILS NFR BLD AUTO: 0.9 % — SIGNIFICANT CHANGE UP (ref 0–1)
BILIRUB SERPL-MCNC: <0.2 MG/DL — SIGNIFICANT CHANGE UP (ref 0.2–1.2)
BUN SERPL-MCNC: 63 MG/DL — CRITICAL HIGH (ref 10–20)
CALCIUM SERPL-MCNC: 7.5 MG/DL — LOW (ref 8.5–10.1)
CHLORIDE SERPL-SCNC: 90 MMOL/L — LOW (ref 98–110)
CO2 SERPL-SCNC: 23 MMOL/L — SIGNIFICANT CHANGE UP (ref 17–32)
COVID-19 SPIKE DOMAIN AB INTERP: POSITIVE
COVID-19 SPIKE DOMAIN ANTIBODY RESULT: >250 U/ML — HIGH
CREAT SERPL-MCNC: 7.8 MG/DL — CRITICAL HIGH (ref 0.7–1.5)
EOSINOPHIL # BLD AUTO: 0.19 K/UL — SIGNIFICANT CHANGE UP (ref 0–0.7)
EOSINOPHIL NFR BLD AUTO: 2.3 % — SIGNIFICANT CHANGE UP (ref 0–8)
ESTIMATED AVERAGE GLUCOSE: 151 MG/DL — HIGH (ref 68–114)
GLUCOSE BLDC GLUCOMTR-MCNC: 131 MG/DL — HIGH (ref 70–99)
GLUCOSE BLDC GLUCOMTR-MCNC: 88 MG/DL — SIGNIFICANT CHANGE UP (ref 70–99)
GLUCOSE BLDC GLUCOMTR-MCNC: 98 MG/DL — SIGNIFICANT CHANGE UP (ref 70–99)
GLUCOSE SERPL-MCNC: 83 MG/DL — SIGNIFICANT CHANGE UP (ref 70–99)
HCT VFR BLD CALC: 31.1 % — LOW (ref 42–52)
HGB BLD-MCNC: 10.2 G/DL — LOW (ref 14–18)
IMM GRANULOCYTES NFR BLD AUTO: 0.1 % — SIGNIFICANT CHANGE UP (ref 0.1–0.3)
LYMPHOCYTES # BLD AUTO: 1.89 K/UL — SIGNIFICANT CHANGE UP (ref 1.2–3.4)
LYMPHOCYTES # BLD AUTO: 23 % — SIGNIFICANT CHANGE UP (ref 20.5–51.1)
MAGNESIUM SERPL-MCNC: 2.1 MG/DL — SIGNIFICANT CHANGE UP (ref 1.8–2.4)
MCHC RBC-ENTMCNC: 29.7 PG — SIGNIFICANT CHANGE UP (ref 27–31)
MCHC RBC-ENTMCNC: 32.8 G/DL — SIGNIFICANT CHANGE UP (ref 32–37)
MCV RBC AUTO: 90.4 FL — SIGNIFICANT CHANGE UP (ref 80–94)
MONOCYTES # BLD AUTO: 0.75 K/UL — HIGH (ref 0.1–0.6)
MONOCYTES NFR BLD AUTO: 9.1 % — SIGNIFICANT CHANGE UP (ref 1.7–9.3)
NEUTROPHILS # BLD AUTO: 5.3 K/UL — SIGNIFICANT CHANGE UP (ref 1.4–6.5)
NEUTROPHILS NFR BLD AUTO: 64.6 % — SIGNIFICANT CHANGE UP (ref 42.2–75.2)
NRBC # BLD: 0 /100 WBCS — SIGNIFICANT CHANGE UP (ref 0–0)
PHOSPHATE SERPL-MCNC: 5.7 MG/DL — HIGH (ref 2.1–4.9)
PLATELET # BLD AUTO: 165 K/UL — SIGNIFICANT CHANGE UP (ref 130–400)
POTASSIUM SERPL-MCNC: 3.6 MMOL/L — SIGNIFICANT CHANGE UP (ref 3.5–5)
POTASSIUM SERPL-SCNC: 3.6 MMOL/L — SIGNIFICANT CHANGE UP (ref 3.5–5)
PROT SERPL-MCNC: 6.5 G/DL — SIGNIFICANT CHANGE UP (ref 6–8)
RBC # BLD: 3.44 M/UL — LOW (ref 4.7–6.1)
RBC # FLD: 13.2 % — SIGNIFICANT CHANGE UP (ref 11.5–14.5)
SARS-COV-2 IGG+IGM SERPL QL IA: >250 U/ML — HIGH
SARS-COV-2 IGG+IGM SERPL QL IA: POSITIVE
SODIUM SERPL-SCNC: 133 MMOL/L — LOW (ref 135–146)
WBC # BLD: 8.21 K/UL — SIGNIFICANT CHANGE UP (ref 4.8–10.8)
WBC # FLD AUTO: 8.21 K/UL — SIGNIFICANT CHANGE UP (ref 4.8–10.8)

## 2021-05-17 PROCEDURE — 99239 HOSP IP/OBS DSCHRG MGMT >30: CPT

## 2021-05-17 PROCEDURE — 99232 SBSQ HOSP IP/OBS MODERATE 35: CPT

## 2021-05-17 RX ORDER — FUROSEMIDE 40 MG
1 TABLET ORAL
Qty: 0 | Refills: 0 | DISCHARGE
Start: 2021-05-17

## 2021-05-17 RX ORDER — SIMVASTATIN 20 MG/1
1 TABLET, FILM COATED ORAL
Qty: 0 | Refills: 0 | DISCHARGE

## 2021-05-17 RX ORDER — ATORVASTATIN CALCIUM 80 MG/1
1 TABLET, FILM COATED ORAL
Qty: 30 | Refills: 0
Start: 2021-05-17

## 2021-05-17 RX ORDER — FUROSEMIDE 40 MG
2 TABLET ORAL
Qty: 0 | Refills: 0 | DISCHARGE
Start: 2021-05-17

## 2021-05-17 RX ORDER — FUROSEMIDE 40 MG
0 TABLET ORAL
Qty: 0 | Refills: 0 | DISCHARGE

## 2021-05-17 RX ADMIN — CLOPIDOGREL BISULFATE 75 MILLIGRAM(S): 75 TABLET, FILM COATED ORAL at 11:06

## 2021-05-17 RX ADMIN — SEVELAMER CARBONATE 800 MILLIGRAM(S): 2400 POWDER, FOR SUSPENSION ORAL at 07:40

## 2021-05-17 RX ADMIN — Medication 8 UNIT(S): at 11:42

## 2021-05-17 RX ADMIN — SEVELAMER CARBONATE 800 MILLIGRAM(S): 2400 POWDER, FOR SUSPENSION ORAL at 11:41

## 2021-05-17 RX ADMIN — HEPARIN SODIUM 5000 UNIT(S): 5000 INJECTION INTRAVENOUS; SUBCUTANEOUS at 05:38

## 2021-05-17 RX ADMIN — Medication 81 MILLIGRAM(S): at 11:06

## 2021-05-17 RX ADMIN — Medication 40 MILLIGRAM(S): at 06:26

## 2021-05-17 RX ADMIN — Medication 50 MILLIGRAM(S): at 05:37

## 2021-05-17 RX ADMIN — Medication 30 MILLIGRAM(S): at 05:37

## 2021-05-17 NOTE — DISCHARGE NOTE NURSING/CASE MANAGEMENT/SOCIAL WORK - PATIENT PORTAL LINK FT
You can access the FollowMyHealth Patient Portal offered by [facility] by registering at the following website: [email or web address]. By joining [facility]’s FollowMyHealth portal, you will also be able to view your health information using other applications (apps) compatible with our system.

## 2021-05-17 NOTE — PROGRESS NOTE ADULT - SUBJECTIVE AND OBJECTIVE BOX
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 Patient states that he has been experiencing episodes of blurry vision for over one month now, without any inciting events, and his vision improves after several hours. Vision does not change with his eye glasses. He states that the dizziness is new and episodic as well. Dizziness is not changed with any change in positron or dialysis treatments. In the ED, he was noticed to have right facial droop, however stroke code not activated because patient could not recall when it began. He otherwise denies any new focal deficits recent ill contacts, chest pain, falls, loss of balance or voiding abnormalities. Upon my evaluation, patient states that his blurred vision resolved.     ED course: /69, HR: 85, T: 97.8, SPO2 98% on room air. Neurology consulted in ED, recommended CTH: showed no new intracranial pathology, CTA: Severe short segment stenosis at the origin of the left vertebral artery due to calcified and noncalcified atherosclerotic plaque. Mild stenosis of the proximal left internal carotid artery due to calcified and noncalcified atherosclerotic plaque which is increased since prior CTA of 1/28/2020. NIHSS 3, admitted for neurendovascular eval   (15 May 2021 17:26)      PAST MEDICAL & SURGICAL HISTORY:  CAD (coronary artery disease)    S/P CABG (coronary artery bypass graft)  x4    Diabetes mellitus    Transient ischemic attack (TIA)  2017; 2008    Chronic kidney disease (CKD)  Stage IV    Hypertension    Stented coronary artery  in 2008    Myocardial infarction  2012    PAD (peripheral artery disease)  S/p bypass left leg    HLD (hyperlipidemia)    BPH (benign prostatic hyperplasia)    Pain in left knee  s/p fall    OA (osteoarthritis)    [place] (hard of hearing)    Chronic anemia    S/P CABG (coronary artery bypass graft)  2012    H/O arterial bypass of lower limb  Left Lower Extremity (2016)    History of surgery  Left CEA (2017)  Left Pinkie toe Amputation (2014)  CABG x 4 (2012)  Card cath - stent (2008)    Medications:  aspirin  chewable 81 milliGRAM(s) Oral daily  clopidogrel Tablet 75 milliGRAM(s) Oral daily  dextrose 40% Gel 15 Gram(s) Oral once  dextrose 5%. 1000 milliLiter(s) IV Continuous <Continuous>  dextrose 5%. 1000 milliLiter(s) IV Continuous <Continuous>  dextrose 50% Injectable 25 Gram(s) IV Push once  dextrose 50% Injectable 12.5 Gram(s) IV Push once  dextrose 50% Injectable 25 Gram(s) IV Push once  furosemide    Tablet 40 milliGRAM(s) Oral daily  glucagon  Injectable 1 milliGRAM(s) IntraMuscular once  heparin   Injectable 5000 Unit(s) SubCutaneous every 12 hours  insulin glargine Injectable (LANTUS) 30 Unit(s) SubCutaneous at bedtime  insulin lispro (ADMELOG) corrective regimen sliding scale   SubCutaneous three times a day before meals  insulin lispro Injectable (ADMELOG) 8 Unit(s) SubCutaneous three times a day before meals  metoprolol tartrate 50 milliGRAM(s) Oral two times a day  NIFEdipine XL 30 milliGRAM(s) Oral daily  sevelamer carbonate 800 milliGRAM(s) Oral three times a day with meals  simvastatin 40 milliGRAM(s) Oral at bedtime      Vital Signs Last 24 Hrs  T(C): 35.7 (17 May 2021 05:36), Max: 36.3 (16 May 2021 13:03)  T(F): 96.3 (17 May 2021 05:36), Max: 97.3 (16 May 2021 13:03)  HR: 81 (17 May 2021 05:36) (68 - 81)  BP: 152/71 (17 May 2021 05:36) (115/56 - 160/75)  BP(mean): 102 (17 May 2021 05:36) (102 - 108)  RR: 18 (17 May 2021 05:36) (18 - 18)  SpO2: --    Neurological Exam:   Mental status: Awake, alert and oriented x3.  Recent and remote memory intact.  Naming, repetition and comprehension intact.  Attention/concentration intact.  No dysarthria, no aphasia.  Fund of knowledge appropriate.    Cranial nerves: Pupils equally round and reactive to light, visual fields full, no nystagmus, extraocular muscles intact, V1 through V3 intact bilaterally and symmetric, face symmetric, hearing intact to finger rub, palate elevation symmetric, tongue was midline.  Motor:  MRC grading 5/5 b/l UE/LE.   strength 5/5.  Normal tone and bulk.  No abnormal movements.    Sensation: Intact to light touch, proprioception, and pinprick.   Coordination: No dysmetria on finger-to-nose and heel-to-shin.  No dysdiadokinesia.  Reflexes: 2+ in bilateral UE/LE, downgoing toes bilaterally. (-) Rojo.  Gait: Narrow and steady. No ataxia.  Romberg negative    Labs:  CBC Full  -  ( 17 May 2021 06:27 )  WBC Count : 8.21 K/uL  RBC Count : 3.44 M/uL  Hemoglobin : 10.2 g/dL  Hematocrit : 31.1 %  Platelet Count - Automated : 165 K/uL  Mean Cell Volume : 90.4 fL  Mean Cell Hemoglobin : 29.7 pg  Mean Cell Hemoglobin Concentration : 32.8 g/dL  Auto Neutrophil # : 5.30 K/uL  Auto Lymphocyte # : 1.89 K/uL  Auto Monocyte # : 0.75 K/uL  Auto Eosinophil # : 0.19 K/uL  Auto Basophil # : 0.07 K/uL  Auto Neutrophil % : 64.6 %  Auto Lymphocyte % : 23.0 %  Auto Monocyte % : 9.1 %  Auto Eosinophil % : 2.3 %  Auto Basophil % : 0.9 %    05-17    133<L>  |  90<L>  |  x   ----------------------------<  83  3.6   |  23  |  x     Ca    7.5<L>      17 May 2021 06:27  Phos  5.7     05-17  Mg     2.1     05-17    TPro  6.5  /  Alb  3.7  /  TBili  <0.2  /  DBili  x   /  AST  10  /  ALT  <5  /  AlkPhos  100  05-17    LIVER FUNCTIONS - ( 17 May 2021 06:27 )  Alb: 3.7 g/dL / Pro: 6.5 g/dL / ALK PHOS: 100 U/L / ALT: <5 U/L / AST: 10 U/L / GGT: x           < from: TTE Echo Complete w/o Contrast w/ Doppler (05.16.21 @ 09:14) >  Summary:   1. Left ventricular ejection fraction, by visual estimation, is 50 to 55%.   2. Moderately increased LV wall thickness.   3. Spectral Doppler shows impaired relaxation pattern of left ventricular myocardial filling (Grade I diastolic dysfunction).   4. Mild to moderately enlarged left atrium.   5. Normal right atrial size.   6. Mild mitral valve regurgitation.   7. Thickening and calcification of the anterior and posterior mitral valve leaflets.   8.Mild tricuspid regurgitation.   9. Color flow doppler and intravenous injection of agitated saline demonstrates the presence of an intact intra atrial septum.  10. There is moderate aortic root calcification.    < end of copied text >    < from: MR Head No Cont (05.16.21 @ 19:15) >  IMPRESSION:    1.  No evidence of recent infarct or acute intracranial hemorrhage.    2.  Mild chronic microvascular changes.    3.  Persistent right mastoid opacification and trace left mastoid fluid.    SAEID MATTHEWS MD; Attending Radiologist  This document has been electronically signed. May 17 2021  9:05AM  < end of copied text >    < from: CT Angio Neck w/ IV Cont (05.15.21 @ 14:50) >  IMPRESSION:    CTA HEAD:  Moderate stenosis of the bilateral V4 segments of the vertebral arteries due to atherosclerotic plaque.    No large vessel occlusion, aneurysm or vessel malformation.    CTA NECK:  Severe short segment stenosis at the origin of the left vertebral artery due to calcified and noncalcified atherosclerotic plaque.    Mild stenosis of the proximal left internal carotid artery due to calcified and noncalcified atherosclerotic plaque which is increased since prior CTA of 1/28/2020.    VIKAS MALAVE M.D., ATTENDING RADIOLOGIST  This document has been electronically signed. May 15 2021  3:46PM    < end of copied text >     Neurology Progress Note    Interval History:  Pt currently improved.  No focal deficits.  Reports facial symmetry may be old since had episodes in the past.      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 Patient states that he has been experiencing episodes of blurry vision for over one month now, without any inciting events, and his vision improves after several hours. Vision does not change with his eye glasses. He states that the dizziness is new and episodic as well. Dizziness is not changed with any change in positron or dialysis treatments. In the ED, he was noticed to have right facial droop, however stroke code not activated because patient could not recall when it began. He otherwise denies any new focal deficits recent ill contacts, chest pain, falls, loss of balance or voiding abnormalities. Upon my evaluation, patient states that his blurred vision resolved.     ED course: /69, HR: 85, T: 97.8, SPO2 98% on room air. Neurology consulted in ED, recommended CTH: showed no new intracranial pathology, CTA: Severe short segment stenosis at the origin of the left vertebral artery due to calcified and noncalcified atherosclerotic plaque. Mild stenosis of the proximal left internal carotid artery due to calcified and noncalcified atherosclerotic plaque which is increased since prior CTA of 1/28/2020. NIHSS 3, admitted for neurendovascular eval   (15 May 2021 17:26)      PAST MEDICAL & SURGICAL HISTORY:  CAD (coronary artery disease)    S/P CABG (coronary artery bypass graft)  x4    Diabetes mellitus    Transient ischemic attack (TIA)  2017; 2008    Chronic kidney disease (CKD)  Stage IV    Hypertension    Stented coronary artery  in 2008    Myocardial infarction  2012    PAD (peripheral artery disease)  S/p bypass left leg    HLD (hyperlipidemia)    BPH (benign prostatic hyperplasia)    Pain in left knee  s/p fall    OA (osteoarthritis)    [place] (hard of hearing)    Chronic anemia    S/P CABG (coronary artery bypass graft)  2012    H/O arterial bypass of lower limb  Left Lower Extremity (2016)    History of surgery  Left CEA (2017)  Left Pinkie toe Amputation (2014)  CABG x 4 (2012)  Card cath - stent (2008)    Medications:  aspirin  chewable 81 milliGRAM(s) Oral daily  clopidogrel Tablet 75 milliGRAM(s) Oral daily  dextrose 40% Gel 15 Gram(s) Oral once  dextrose 5%. 1000 milliLiter(s) IV Continuous <Continuous>  dextrose 5%. 1000 milliLiter(s) IV Continuous <Continuous>  dextrose 50% Injectable 25 Gram(s) IV Push once  dextrose 50% Injectable 12.5 Gram(s) IV Push once  dextrose 50% Injectable 25 Gram(s) IV Push once  furosemide    Tablet 40 milliGRAM(s) Oral daily  glucagon  Injectable 1 milliGRAM(s) IntraMuscular once  heparin   Injectable 5000 Unit(s) SubCutaneous every 12 hours  insulin glargine Injectable (LANTUS) 30 Unit(s) SubCutaneous at bedtime  insulin lispro (ADMELOG) corrective regimen sliding scale   SubCutaneous three times a day before meals  insulin lispro Injectable (ADMELOG) 8 Unit(s) SubCutaneous three times a day before meals  metoprolol tartrate 50 milliGRAM(s) Oral two times a day  NIFEdipine XL 30 milliGRAM(s) Oral daily  sevelamer carbonate 800 milliGRAM(s) Oral three times a day with meals  simvastatin 40 milliGRAM(s) Oral at bedtime      Vital Signs Last 24 Hrs  T(C): 35.7 (17 May 2021 05:36), Max: 36.3 (16 May 2021 13:03)  T(F): 96.3 (17 May 2021 05:36), Max: 97.3 (16 May 2021 13:03)  HR: 81 (17 May 2021 05:36) (68 - 81)  BP: 152/71 (17 May 2021 05:36) (115/56 - 160/75)  BP(mean): 102 (17 May 2021 05:36) (102 - 108)  RR: 18 (17 May 2021 05:36) (18 - 18)  SpO2: --    Neurological Exam:   Mental status: Awake, alert and oriented x3.  Recent and remote memory intact.  Naming, repetition and comprehension intact.  Attention/concentration intact.  No dysarthria, no aphasia.  Fund of knowledge appropriate.    Cranial nerves: Pupils equally round and reactive to light, visual fields full, no nystagmus, extraocular muscles intact, V1 through V3 intact bilaterally and symmetric, face asymmetric R NLF, hearing intact to finger rub, palate elevation symmetric, tongue was midline.  Motor:  MRC grading 5/5 b/l UE/LE.   strength 5/5.  Normal tone and bulk.  No abnormal movements.    Sensation: Intact to light touch, proprioception, and pinprick.   Coordination: No dysmetria on finger-to-nose and heel-to-shin.  No dysdiadokinesia.  Reflexes: 2+ in bilateral UE/LE, downgoing toes bilaterally. (-) Rojo.    NIHSS: 1 (chronic)    Labs:  CBC Full  -  ( 17 May 2021 06:27 )  WBC Count : 8.21 K/uL  RBC Count : 3.44 M/uL  Hemoglobin : 10.2 g/dL  Hematocrit : 31.1 %  Platelet Count - Automated : 165 K/uL  Mean Cell Volume : 90.4 fL  Mean Cell Hemoglobin : 29.7 pg  Mean Cell Hemoglobin Concentration : 32.8 g/dL  Auto Neutrophil # : 5.30 K/uL  Auto Lymphocyte # : 1.89 K/uL  Auto Monocyte # : 0.75 K/uL  Auto Eosinophil # : 0.19 K/uL  Auto Basophil # : 0.07 K/uL  Auto Neutrophil % : 64.6 %  Auto Lymphocyte % : 23.0 %  Auto Monocyte % : 9.1 %  Auto Eosinophil % : 2.3 %  Auto Basophil % : 0.9 %    05-17    133<L>  |  90<L>  |  x   ----------------------------<  83  3.6   |  23  |  x     Ca    7.5<L>      17 May 2021 06:27  Phos  5.7     05-17  Mg     2.1     05-17    TPro  6.5  /  Alb  3.7  /  TBili  <0.2  /  DBili  x   /  AST  10  /  ALT  <5  /  AlkPhos  100  05-17    LIVER FUNCTIONS - ( 17 May 2021 06:27 )  Alb: 3.7 g/dL / Pro: 6.5 g/dL / ALK PHOS: 100 U/L / ALT: <5 U/L / AST: 10 U/L / GGT: x           < from: TTE Echo Complete w/o Contrast w/ Doppler (05.16.21 @ 09:14) >  Summary:   1. Left ventricular ejection fraction, by visual estimation, is 50 to 55%.   2. Moderately increased LV wall thickness.   3. Spectral Doppler shows impaired relaxation pattern of left ventricular myocardial filling (Grade I diastolic dysfunction).   4. Mild to moderately enlarged left atrium.   5. Normal right atrial size.   6. Mild mitral valve regurgitation.   7. Thickening and calcification of the anterior and posterior mitral valve leaflets.   8.Mild tricuspid regurgitation.   9. Color flow doppler and intravenous injection of agitated saline demonstrates the presence of an intact intra atrial septum.  10. There is moderate aortic root calcification.    < end of copied text >    < from: MR Head No Cont (05.16.21 @ 19:15) >  IMPRESSION:    1.  No evidence of recent infarct or acute intracranial hemorrhage.    2.  Mild chronic microvascular changes.    3.  Persistent right mastoid opacification and trace left mastoid fluid.    SAEID MATTHEWS MD; Attending Radiologist  This document has been electronically signed. May 17 2021  9:05AM  < end of copied text >    < from: CT Angio Neck w/ IV Cont (05.15.21 @ 14:50) >  IMPRESSION:    CTA HEAD:  Moderate stenosis of the bilateral V4 segments of the vertebral arteries due to atherosclerotic plaque.    No large vessel occlusion, aneurysm or vessel malformation.    CTA NECK:  Severe short segment stenosis at the origin of the left vertebral artery due to calcified and noncalcified atherosclerotic plaque.    Mild stenosis of the proximal left internal carotid artery due to calcified and noncalcified atherosclerotic plaque which is increased since prior CTA of 1/28/2020.    VIKAS MALAVE M.D., ATTENDING RADIOLOGIST  This document has been electronically signed. May 15 2021  3:46PM    < end of copied text >

## 2021-05-17 NOTE — DISCHARGE NOTE PROVIDER - HOSPITAL COURSE
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 Patient states that he has been experiencing episodes of blurry vision for over one month now, without any inciting events, and his vision improves after several hours. Vision does not change with his eye glasses. He states that the dizziness is new and episodic as well. Dizziness is not changed with any change in positron or dialysis treatments. In the ED, he was noticed to have right facial droop, however stroke code not activated because patient could not recall when it began. He otherwise denies any new focal deficits recent ill contacts, chest pain, falls, loss of balance or voiding abnormalities. Upon my evaluation, patient states that his blurred vision resolved.     ED course: /69, HR: 85, T: 97.8, SPO2 98% on room air. Neurology consulted in ED, recommended CTH: showed no new intracranial pathology, CTA: Severe short segment stenosis at the origin of the left vertebral artery due to calcified and noncalcified atherosclerotic plaque. Mild stenosis of the proximal left internal carotid artery due to calcified and noncalcified atherosclerotic plaque which is increased since prior CTA of 1/28/2020. NIHSS 3, admitted for neuroendovascular eval    Hospital Course:   Patient was admitted for rule out stroke. CTH was negative, once again demonstration of stenonsis  on his CTA. MR head negative for acute infarcts.

## 2021-05-17 NOTE — PROGRESS NOTE ADULT - SUBJECTIVE AND OBJECTIVE BOX
Patient is a 61y old  Male who presents with a chief complaint of Blurred vision (16 May 2021 14:00)    INTERVAL HPI/OVERNIGHT EVENTS: Patient was examined and seen at bedside. This morning he is resting comfortably in bed and reports no new issues or overnight events. No complaints, feels better. No dizziness or blurring. Ambulating on his own.  ROS: Denies CP, SOB, AP, new weakness  All other systems reviewed and are within normal limits.  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 Patient states that he has been experiencing episodes of blurry vision for over one month now, without any inciting events, and his vision improves after several hours. Vision does not change with his eye glasses. He states that the dizziness is new and episodic as well. Dizziness is not changed with any change in positron or dialysis treatments. In the ED, he was noticed to have right facial droop, however stroke code not activated because patient could not recall when it began. He otherwise denies any new focal deficits recent ill contacts, chest pain, falls, loss of balance or voiding abnormalities. Upon my evaluation, patient states that his blurred vision resolved.     ED course: /69, HR: 85, T: 97.8, SPO2 98% on room air. Neurology consulted in ED, recommended CTH: showed no new intracranial pathology, CTA: Severe short segment stenosis at the origin of the left vertebral artery due to calcified and noncalcified atherosclerotic plaque. Mild stenosis of the proximal left internal carotid artery due to calcified and noncalcified atherosclerotic plaque which is increased since prior CTA of 1/28/2020. NIHSS 3, admitted for neurendovascular eval   (15 May 2021 17:26)    PAST MEDICAL & SURGICAL HISTORY:  CAD (coronary artery disease)    S/P CABG (coronary artery bypass graft)  x4    Diabetes mellitus    Transient ischemic attack (TIA)  2017; 2008    Chronic kidney disease (CKD)  Stage IV    Hypertension    Stented coronary artery  in 2008    Myocardial infarction  2012    PAD (peripheral artery disease)  S/p bypass left leg    HLD (hyperlipidemia)    BPH (benign prostatic hyperplasia)    Pain in left knee  s/p fall    OA (osteoarthritis)    [place] (hard of hearing)    Chronic anemia    S/P CABG (coronary artery bypass graft)  2012    H/O arterial bypass of lower limb  Left Lower Extremity (2016)    History of surgery  Left CEA (2017)  Left Pinkie toe Amputation (2014)  CABG x 4 (2012)  Card cath - stent (2008)          General: NAD, AAO3  HEENT:  EOMI, no LAD  CV: S1 S2  Resp: decreased breath sounds at bases  GI: NT/ND/S +BS  MS: no clubbing/cyanosis/edema, + pulses b/l  Neuro: nonfocal, +reflexes thruout            MEDICATIONS  (STANDING):  aspirin  chewable 81 milliGRAM(s) Oral daily  clopidogrel Tablet 75 milliGRAM(s) Oral daily  dextrose 40% Gel 15 Gram(s) Oral once  dextrose 5%. 1000 milliLiter(s) (50 mL/Hr) IV Continuous <Continuous>  dextrose 5%. 1000 milliLiter(s) (100 mL/Hr) IV Continuous <Continuous>  dextrose 50% Injectable 25 Gram(s) IV Push once  dextrose 50% Injectable 12.5 Gram(s) IV Push once  dextrose 50% Injectable 25 Gram(s) IV Push once  furosemide    Tablet 40 milliGRAM(s) Oral daily  glucagon  Injectable 1 milliGRAM(s) IntraMuscular once  heparin   Injectable 5000 Unit(s) SubCutaneous every 12 hours  insulin glargine Injectable (LANTUS) 30 Unit(s) SubCutaneous at bedtime  insulin lispro (ADMELOG) corrective regimen sliding scale   SubCutaneous three times a day before meals  insulin lispro Injectable (ADMELOG) 8 Unit(s) SubCutaneous three times a day before meals  metoprolol tartrate 50 milliGRAM(s) Oral two times a day  NIFEdipine XL 30 milliGRAM(s) Oral daily  sevelamer carbonate 800 milliGRAM(s) Oral three times a day with meals  simvastatin 40 milliGRAM(s) Oral at bedtime    MEDICATIONS  (PRN):    Home Medications:  aspirin 81 mg oral tablet: 1 tab(s) orally once a day (15 May 2021 18:39)  furosemide 40 mg oral tablet: 1 tab(s) orally once a day (17 May 2021 12:30)  Levemir 100 units/mL subcutaneous solution: 40 unit(s) subcutaneous once a day (at bedtime).  (15 May 2021 18:39)  Metoprolol Tartrate 50 mg oral tablet: 1 tab(s) orally 2 times a day (15 May 2021 18:39)  Plavix 75 mg oral tablet: 1 tab(s) orally once a day (15 May 2021 18:39)    Vital Signs Last 24 Hrs  T(C): 35.7 (17 May 2021 05:36), Max: 36.1 (16 May 2021 20:20)  T(F): 96.3 (17 May 2021 05:36), Max: 97 (16 May 2021 20:20)  HR: 72 (17 May 2021 15:15) (68 - 81)  BP: 113/70 (17 May 2021 15:15) (113/70 - 160/75)  BP(mean): 102 (17 May 2021 05:36) (102 - 108)  RR: 18 (17 May 2021 15:15) (18 - 18)  SpO2: --  CAPILLARY BLOOD GLUCOSE      POCT Blood Glucose.: 131 mg/dL (17 May 2021 11:40)  POCT Blood Glucose.: 88 mg/dL (17 May 2021 07:38)  POCT Blood Glucose.: 174 mg/dL (16 May 2021 21:27)  POCT Blood Glucose.: 112 mg/dL (16 May 2021 16:34)    LABS:                        10.2   8.21  )-----------( 165      ( 17 May 2021 06:27 )             31.1     05-17    133<L>  |  90<L>  |  63<HH>  ----------------------------<  83  3.6   |  23  |  7.8<HH>    Ca    7.5<L>      17 May 2021 06:27  Phos  5.7     05-17  Mg     2.1     05-17    TPro  6.5  /  Alb  3.7  /  TBili  <0.2  /  DBili  x   /  AST  10  /  ALT  <5  /  AlkPhos  100  05-17    LIVER FUNCTIONS - ( 17 May 2021 06:27 )  Alb: 3.7 g/dL / Pro: 6.5 g/dL / ALK PHOS: 100 U/L / ALT: <5 U/L / AST: 10 U/L / GGT: x                           Consultant Notes Reviewed:  [x ] YES  [ ] NO  Care Discussed with Consultants/Other Providers/ Housestaff [ x] YES  [ ] NO  Radiology, labs, new studies personally reviewed.

## 2021-05-17 NOTE — PROGRESS NOTE ADULT - ASSESSMENT
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  ·	ESRD on HD MWF   ·	 HD session today  ·	pending IP, continue sevelamer 800 mg TID with meals   ·	BP noted / continue home meds would not decrease it further in the context of neurological symptoms   Blurry vision / vertebral artery stenosis / neuro on case /MRI brain showing no infarct/outpatient followup 61 year old male with PMH of CVA with left sided weakness and ataxic gait ,CAD s/p PCI in 2008 and CABG in 2012 , ESRD on HD (M/W/F) , DM, HTN, DLD, PAD s/p fem pop, presents to the ED with episode of dizziness associated with b/l blurred vision.  ·	ESRD on HD MWF   ·	 HD session today 3h opti 160 UF 3l   ·	pending IP, continue sevelamer 800 mg TID with meals   ·	BP noted / continue home meds would not decrease it further in the context of neurological symptoms   Blurry vision / vertebral artery stenosis / neuro on case /MRI brain showing no infarct/outpatient followup

## 2021-05-17 NOTE — DISCHARGE NOTE PROVIDER - CARE PROVIDERS DIRECT ADDRESSES
[email or web address]

## 2021-05-17 NOTE — PROGRESS NOTE ADULT - SUBJECTIVE AND OBJECTIVE BOX
Nephrology progress note    Patient is seen and examined, events over the last 24 h noted .    Allergies:  No Known Allergies    Hospital Medications:   MEDICATIONS  (STANDING):  aspirin  chewable 81 milliGRAM(s) Oral daily  clopidogrel Tablet 75 milliGRAM(s) Oral daily  dextrose 40% Gel 15 Gram(s) Oral once  dextrose 5%. 1000 milliLiter(s) (50 mL/Hr) IV Continuous <Continuous>  dextrose 5%. 1000 milliLiter(s) (100 mL/Hr) IV Continuous <Continuous>  dextrose 50% Injectable 25 Gram(s) IV Push once  dextrose 50% Injectable 12.5 Gram(s) IV Push once  dextrose 50% Injectable 25 Gram(s) IV Push once  furosemide    Tablet 40 milliGRAM(s) Oral daily  glucagon  Injectable 1 milliGRAM(s) IntraMuscular once  heparin   Injectable 5000 Unit(s) SubCutaneous every 12 hours  insulin glargine Injectable (LANTUS) 30 Unit(s) SubCutaneous at bedtime  insulin lispro (ADMELOG) corrective regimen sliding scale   SubCutaneous three times a day before meals  insulin lispro Injectable (ADMELOG) 8 Unit(s) SubCutaneous three times a day before meals  metoprolol tartrate 50 milliGRAM(s) Oral two times a day  NIFEdipine XL 30 milliGRAM(s) Oral daily  sevelamer carbonate 800 milliGRAM(s) Oral three times a day with meals  simvastatin 40 milliGRAM(s) Oral at bedtime        VITALS:  T(F): 96.3 (05-17-21 @ 05:36), Max: 97.3 (05-16-21 @ 13:03)  HR: 81 (05-17-21 @ 05:36)  BP: 152/71 (05-17-21 @ 05:36)  RR: 18 (05-17-21 @ 05:36)  SpO2: --  Wt(kg): --    05-16 @ 07:01  -  05-17 @ 07:00  --------------------------------------------------------  IN: 100 mL / OUT: 0 mL / NET: 100 mL      Height (cm): 180.3 (05-16 @ 14:37)  Weight (kg): 98 (05-16 @ 14:37)  BMI (kg/m2): 30.1 (05-16 @ 14:37)  BSA (m2): 2.18 (05-16 @ 14:37)    PHYSICAL EXAM:  Constitutional: NAD  HEENT: anicteric sclera, oropharynx clear, MMM  Neck: No JVD  Respiratory: CTAB, no wheezes, rales or rhonchi  Cardiovascular: S1, S2, RRR  Gastrointestinal: BS+, soft, NT/ND  Extremities: No cyanosis or clubbing. No peripheral edema  :  No schneider.   Skin: No rashes    LABS:  05-16    136  |  91<L>  |  51<H>  ----------------------------<  122<H>  3.7   |  26  |  6.7<HH>    Ca    8.0<L>      16 May 2021 09:34  Phos  4.3     05-16  Mg     2.0     05-16    TPro  6.7  /  Alb  3.8  /  TBili  <0.2  /  DBili      /  AST  13  /  ALT  6   /  AlkPhos  104  05-16                          10.2   8.21  )-----------( 165      ( 17 May 2021 06:27 )             31.1       Urine Studies:      RADIOLOGY & ADDITIONAL STUDIES:   Nephrology progress note    Patient is seen and examined, events over the last 24 h noted .  seen with renal resident   no complaints     Allergies:  No Known Allergies    Hospital Medications:   MEDICATIONS  (STANDING):  aspirin  chewable 81 milliGRAM(s) Oral daily  clopidogrel Tablet 75 milliGRAM(s) Oral daily  dextrose 40% Gel 15 Gram(s) Oral once  dextrose 5%. 1000 milliLiter(s) (50 mL/Hr) IV Continuous <Continuous>  dextrose 5%. 1000 milliLiter(s) (100 mL/Hr) IV Continuous <Continuous>  dextrose 50% Injectable 25 Gram(s) IV Push once  dextrose 50% Injectable 12.5 Gram(s) IV Push once  dextrose 50% Injectable 25 Gram(s) IV Push once  furosemide    Tablet 40 milliGRAM(s) Oral daily  glucagon  Injectable 1 milliGRAM(s) IntraMuscular once  heparin   Injectable 5000 Unit(s) SubCutaneous every 12 hours  insulin glargine Injectable (LANTUS) 30 Unit(s) SubCutaneous at bedtime  insulin lispro (ADMELOG) corrective regimen sliding scale   SubCutaneous three times a day before meals  insulin lispro Injectable (ADMELOG) 8 Unit(s) SubCutaneous three times a day before meals  metoprolol tartrate 50 milliGRAM(s) Oral two times a day  NIFEdipine XL 30 milliGRAM(s) Oral daily  sevelamer carbonate 800 milliGRAM(s) Oral three times a day with meals  simvastatin 40 milliGRAM(s) Oral at bedtime        VITALS:  T(F): 96.3 (05-17-21 @ 05:36), Max: 97.3 (05-16-21 @ 13:03)  HR: 81 (05-17-21 @ 05:36)  BP: 152/71 (05-17-21 @ 05:36)  RR: 18 (05-17-21 @ 05:36)  SpO2: --  Wt(kg): --    05-16 @ 07:01  -  05-17 @ 07:00  --------------------------------------------------------  IN: 100 mL / OUT: 0 mL / NET: 100 mL      Height (cm): 180.3 (05-16 @ 14:37)  Weight (kg): 98 (05-16 @ 14:37)  BMI (kg/m2): 30.1 (05-16 @ 14:37)  BSA (m2): 2.18 (05-16 @ 14:37)    PHYSICAL EXAM:  Constitutional: NAD  HEENT: anicteric sclera, oropharynx clear, MMM  Neck: No JVD  Respiratory: CTAB, no wheezes, rales or rhonchi  Cardiovascular: S1, S2, RRR  Gastrointestinal: BS+, soft, NT/ND  Extremities: No cyanosis or clubbing. No peripheral edema  :  No schneider.   Skin: No rashes    LABS:  05-16    136  |  91<L>  |  51<H>  ----------------------------<  122<H>  3.7   |  26  |  6.7<HH>    Ca    8.0<L>      16 May 2021 09:34  Phos  4.3     05-16  Mg     2.0     05-16    TPro  6.7  /  Alb  3.8  /  TBili  <0.2  /  DBili      /  AST  13  /  ALT  6   /  AlkPhos  104  05-16                          10.2   8.21  )-----------( 165      ( 17 May 2021 06:27 )             31.1       Urine Studies:      RADIOLOGY & ADDITIONAL STUDIES:

## 2021-05-17 NOTE — DISCHARGE NOTE PROVIDER - CARE PROVIDER_API CALL
Chepe Galvez)  Neuromuscular Medicine  [address] [place] [address], [address]  [place], [place] [id number]  Phone: [phone]  Fax: [phone]  Follow Up Time: 2 weeks    Petey Franco  Ophthalmology  [address] [place] [address]  Phone: [phone]  Fax: [phone]  Follow Up Time: 2 weeks   Chepe Galvez)  Neuromuscular Medicine  [address] [facility], [address]  [place], [place] [id number]  Phone: [phone]  Fax: [phone]  Follow Up Time: 2 weeks    Petey Franco  Ophthalmology  [address] [place] [address]  Phone: [phone]  Fax: [phone]  Follow Up Time: 2 weeks    Orlando Sandoval)  [address] [place] [address]  Phone: [phone]  Fax: [phone]  Follow Up Time: 1 week

## 2021-05-17 NOTE — PROGRESS NOTE ADULT - SUBJECTIVE AND OBJECTIVE BOX
SUBJECTIVE:    Patient is a 61y old Male who presents with a chief complaint of Blurred vision (17 May 2021 09:57)  Currently admitted to medicine with the primary diagnosis of Vertebral artery stenosis, unspecified laterality  Today is hospital day 2d. This morning he is resting comfortably in bed and reports no new issues or overnight events.     PAST MEDICAL & SURGICAL HISTORY  CAD (coronary artery disease)    S/P CABG (coronary artery bypass graft)  x4    Diabetes mellitus    Transient ischemic attack (TIA)  2017; 2008    Chronic kidney disease (CKD)  Stage IV    Hypertension    Stented coronary artery  in 2008    Myocardial infarction  2012    PAD (peripheral artery disease)  S/p bypass left leg    HLD (hyperlipidemia)    BPH (benign prostatic hyperplasia)    Pain in left knee  s/p fall    OA (osteoarthritis)    [place] (hard of hearing)    Chronic anemia    S/P CABG (coronary artery bypass graft)  2012    H/O arterial bypass of lower limb  Left Lower Extremity (2016)    History of surgery  Left CEA (2017)  Left Pinkie toe Amputation (2014)  CABG x 4 (2012)  Card cath - stent (2008)          ALLERGIES:  No Known Allergies    MEDICATIONS:  STANDING MEDICATIONS  aspirin  chewable 81 milliGRAM(s) Oral daily  clopidogrel Tablet 75 milliGRAM(s) Oral daily  dextrose 40% Gel 15 Gram(s) Oral once  dextrose 5%. 1000 milliLiter(s) IV Continuous <Continuous>  dextrose 5%. 1000 milliLiter(s) IV Continuous <Continuous>  dextrose 50% Injectable 25 Gram(s) IV Push once  dextrose 50% Injectable 12.5 Gram(s) IV Push once  dextrose 50% Injectable 25 Gram(s) IV Push once  furosemide    Tablet 40 milliGRAM(s) Oral daily  glucagon  Injectable 1 milliGRAM(s) IntraMuscular once  heparin   Injectable 5000 Unit(s) SubCutaneous every 12 hours  insulin glargine Injectable (LANTUS) 30 Unit(s) SubCutaneous at bedtime  insulin lispro (ADMELOG) corrective regimen sliding scale   SubCutaneous three times a day before meals  insulin lispro Injectable (ADMELOG) 8 Unit(s) SubCutaneous three times a day before meals  metoprolol tartrate 50 milliGRAM(s) Oral two times a day  NIFEdipine XL 30 milliGRAM(s) Oral daily  sevelamer carbonate 800 milliGRAM(s) Oral three times a day with meals  simvastatin 40 milliGRAM(s) Oral at bedtime    PRN MEDICATIONS    VITALS:   T(F): 96.3  HR: 81  BP: 152/71  RR: 18  SpO2: --    LABS:                        10.2   8.21  )-----------( 165      ( 17 May 2021 06:27 )             31.1     05-16    136  |  91<L>  |  51<H>  ----------------------------<  122<H>  3.7   |  26  |  6.7<HH>    Ca    8.0<L>      16 May 2021 09:34  Phos  4.3     05-16  Mg     2.0     05-16    TPro  6.7  /  Alb  3.8  /  TBili  <0.2  /  DBili  x   /  AST  13  /  ALT  6   /  AlkPhos  104  05-16    RADIOLOGY:  < from: MR Head No Cont (05.16.21 @ 19:15) >  EXAM:  MR BRAIN            PROCEDURE DATE:  05/16/2021            INTERPRETATION:  Clinical History / Reason for exam: Blurred vision. Left facial droop.    TECHNIQUE: MRI brain without contrast. Multiplanar multisequential MRI of the brain withoutintravenous contrast administration on the 3 Reyna magnet.    COMPARISON: Brain MRI 1/29/2020. CT head 5/15/2021.    FINDINGS:    The ventricles and cortical sulci are normal in size and configuration.    There are stable scattered punctate foci of T2/FLAIR signal hyperintensity within the cerebral hemispheric white matter consistent with chronic microvascular change.    Stable punctate focus of susceptibility artifact within the left parietal lobe compatible with previous microhemorrhage.    There is no evidence of acute intracranial hemorrhage, extra-axial fluid collection or midline shift.    There is no diffusion abnormality to suggest acute/subacute infarct. There is normal flow void present within the major vascular structures.    The pituitary is normal in size. The cerebellar tonsils are normal in position. There is normal marrow signal within the clivus.    There is unchanged opacification of the right mastoid complex and trace left mastoid fluid. There is mild scattered paranasal sinus thickening.    IMPRESSION:    1.  No evidence of recent infarct or acute intracranial hemorrhage.    2.  Mild chronic microvascular changes.    3.  Persistent right mastoid opacification and trace left mastoid fluid.      SAEID MATTHEWS MD; Attending Radiologist  This document has been electronically signed. May 17 2021  9:05AM    < end of copied text >      PHYSICAL EXAM:  GEN: No acute distress, seen resting comfortably in his chiar  LUNGS: no resp distress, CTAL  HEART: S1/S2 present. RRR.   ABD: Soft, non-tender, non-distended. Bowel sounds present  EXT: NC/NC/NE/2+PP/MARQUES/Skin Intact.   NEURO: AAOX3  PSYCH: pleasant to speak with

## 2021-05-17 NOTE — DISCHARGE NOTE PROVIDER - NSDCCPCAREPLAN_GEN_ALL_CORE_FT
PRINCIPAL DISCHARGE DIAGNOSIS  Diagnosis: Vertebral artery stenosis, unspecified laterality  Assessment and Plan of Treatment: This is a narrowing of your arteries in your cerebral circulation. This may have caused your symptoms. Please follow up with the neruology team as this may need an intervention.       PRINCIPAL DISCHARGE DIAGNOSIS  Diagnosis: Vertebral artery stenosis, unspecified laterality  Assessment and Plan of Treatment: This is a narrowing of your arteries in your cerebral circulation. This may have caused your symptoms. Please follow up with the neruology team as this may need an intervention. In mean time please take your meds as prescribed.       PRINCIPAL DISCHARGE DIAGNOSIS  Diagnosis: Vertebral artery stenosis, unspecified laterality  Assessment and Plan of Treatment: You came in with episodic dizziness and blurred vision. MRI did not reveal any acute changes. You do have narrowing of your arteries in your cerebral circulation.. Please follow up with the neurology team as this may need an monitoring and possible intervention intervention. In mean time please take your meds as prescribed. Please follow up with opthalmology as outpt.

## 2021-05-17 NOTE — PROGRESS NOTE ADULT - ASSESSMENT
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    Blurred vison + Dizziness   - Patient currently hemodynamically stable, symptoms resolved, , no evidence of facial asymmetry.  - CT Head negative for acute intracranial pathology  - CTAngio Head and neck: Severe short segment stenosis at the origin of the left vertebral artery due to calcified and noncalcified atherosclerotic plaque. Mild stenosis of the proximal left internal carotid artery due to calcified and noncalcified atherosclerotic plaque which is increased since prior CTA of 1/28/2020.  - Neurology following: reccs appreciated  - MRI negative  - c/w ASA and Plvix for now, statin  - cleared by neuro for outpt f/u    CAD, s/p staged PCI  CABG 2012  - c/w ASA  - c/w metoprolol     ESRD on HD M/W/F  - K stable  - nephrology consult for HD  - Continue Sevelamer TID    Normocytic Anemia of Chronic Disease  - likely 2/2 ESRD  - hb stable at 10  - no evidence of overt bleeding    DM Type II  - on levamir 40 units HS  - FS AC HS  - will start basal bolus  - A1c    Hypertension  - c/w metoprolol    DLD  - c/w statin      Discharge instructions discussed and patient knows when to seek immediate medical attention.  Patient has proper follow up.  All results discussed and patient aware they require further follow up/work up.  Stressed importance of proper follow up.  Medications prescribed and changes discussed.  All questions and concerns from patient addressed. Understanding of instructions verbalized.    Time spent in completing discharge process and coordinating care 35 minutes.    Discussed with housestaff, nursing, case mgmt

## 2021-05-17 NOTE — DISCHARGE NOTE PROVIDER - PROVIDER TOKENS
PROVIDER:[TOKEN:[[id number]],FOLLOWUP:[2 weeks]],PROVIDER:[TOKEN:[[id number]],FOLLOWUP:[2 weeks]] PROVIDER:[TOKEN:[[id number]],FOLLOWUP:[2 weeks]],PROVIDER:[TOKEN:[[id number]],FOLLOWUP:[2 weeks]],PROVIDER:[TOKEN:[[id number]],FOLLOWUP:[1 week]]

## 2021-05-17 NOTE — PROGRESS NOTE ADULT - ASSESSMENT
61 year old man with CVA , CAD, ESRD / HD dependant , DM, HTN, DLD, PAD, CABG. Pt presents w/ transient b/l blurry vision w/ ? transient facial weakness currently back to baseline found to have L vertebral stenosis.  No acute infarcts noted currently.     Recommendations:  - Continue secondary stroke prevention with Aspirin, and plavix and Lipitor.   - lifestyle modification  - f/u in stroke clinic w/n 2 weeks  - may consider endovascular evaluation after stroke clinic if indicated  - no further inpt neurologic w/u 61 year old man with CVA , CAD, ESRD / HD dependant , DM, HTN, DLD, PAD, CABG. Pt presents w/ transient b/l blurry vision w/ ? facial asymmetry currently back to baseline found to have L vertebral stenosis of unclear clinical significance w/ no acute infarcts noted currently on MRI.  Risks of intervention outweighs benefits at this time.       Recommendations:  - Continue secondary stroke prevention with Aspirin, and plavix and Lipitor.   - lifestyle modification  - f/u in stroke clinic w/n 2 weeks  - may consider endovascular evaluation after stroke clinic if indicated  - may consider MR HORN as outpt for flow assessment  - no further inpt neurologic w/u

## 2021-05-27 DIAGNOSIS — Z99.2 DEPENDENCE ON RENAL DIALYSIS: ICD-10-CM

## 2021-05-27 DIAGNOSIS — I65.02 OCCLUSION AND STENOSIS OF LEFT VERTEBRAL ARTERY: ICD-10-CM

## 2021-05-27 DIAGNOSIS — M19.91 PRIMARY OSTEOARTHRITIS, UNSPECIFIED SITE: ICD-10-CM

## 2021-05-27 DIAGNOSIS — Z89.022 ACQUIRED ABSENCE OF LEFT FINGER(S): ICD-10-CM

## 2021-05-27 DIAGNOSIS — I25.2 OLD MYOCARDIAL INFARCTION: ICD-10-CM

## 2021-05-27 DIAGNOSIS — E11.22 TYPE 2 DIABETES MELLITUS WITH DIABETIC CHRONIC KIDNEY DISEASE: ICD-10-CM

## 2021-05-27 DIAGNOSIS — I65.22 OCCLUSION AND STENOSIS OF LEFT CAROTID ARTERY: ICD-10-CM

## 2021-05-27 DIAGNOSIS — I67.2 CEREBRAL ATHEROSCLEROSIS: ICD-10-CM

## 2021-05-27 DIAGNOSIS — Z86.73 PERSONAL HISTORY OF TRANSIENT ISCHEMIC ATTACK (TIA), AND CEREBRAL INFARCTION WITHOUT RESIDUAL DEFICITS: ICD-10-CM

## 2021-05-27 DIAGNOSIS — R29.810 FACIAL WEAKNESS: ICD-10-CM

## 2021-05-27 DIAGNOSIS — Z95.5 PRESENCE OF CORONARY ANGIOPLASTY IMPLANT AND GRAFT: ICD-10-CM

## 2021-05-27 DIAGNOSIS — I12.0 HYPERTENSIVE CHRONIC KIDNEY DISEASE WITH STAGE 5 CHRONIC KIDNEY DISEASE OR END STAGE RENAL DISEASE: ICD-10-CM

## 2021-05-27 DIAGNOSIS — Z95.1 PRESENCE OF AORTOCORONARY BYPASS GRAFT: ICD-10-CM

## 2021-05-27 DIAGNOSIS — Z79.4 LONG TERM (CURRENT) USE OF INSULIN: ICD-10-CM

## 2021-05-27 DIAGNOSIS — I25.10 ATHEROSCLEROTIC HEART DISEASE OF NATIVE CORONARY ARTERY WITHOUT ANGINA PECTORIS: ICD-10-CM

## 2021-05-27 DIAGNOSIS — D63.1 ANEMIA IN CHRONIC KIDNEY DISEASE: ICD-10-CM

## 2021-05-27 DIAGNOSIS — E78.5 HYPERLIPIDEMIA, UNSPECIFIED: ICD-10-CM

## 2021-05-27 DIAGNOSIS — N18.6 END STAGE RENAL DISEASE: ICD-10-CM

## 2021-05-27 DIAGNOSIS — Z79.82 LONG TERM (CURRENT) USE OF ASPIRIN: ICD-10-CM

## 2021-05-27 DIAGNOSIS — E11.51 TYPE 2 DIABETES MELLITUS WITH DIABETIC PERIPHERAL ANGIOPATHY WITHOUT GANGRENE: ICD-10-CM

## 2021-05-27 DIAGNOSIS — Z79.02 LONG TERM (CURRENT) USE OF ANTITHROMBOTICS/ANTIPLATELETS: ICD-10-CM

## 2021-05-27 DIAGNOSIS — Z20.822 CONTACT WITH AND (SUSPECTED) EXPOSURE TO COVID-19: ICD-10-CM

## 2021-06-01 ENCOUNTER — APPOINTMENT (OUTPATIENT)
Dept: VASCULAR SURGERY | Facility: CLINIC | Age: 61
End: 2021-06-01

## 2021-06-01 ENCOUNTER — APPOINTMENT (OUTPATIENT)
Dept: NEUROLOGY | Facility: CLINIC | Age: 61
End: 2021-06-01
Payer: MEDICARE

## 2021-06-01 VITALS
WEIGHT: 198 LBS | HEART RATE: 80 BPM | BODY MASS INDEX: 27.72 KG/M2 | TEMPERATURE: 97 F | HEIGHT: 71 IN | DIASTOLIC BLOOD PRESSURE: 65 MMHG | OXYGEN SATURATION: 98 % | SYSTOLIC BLOOD PRESSURE: 106 MMHG

## 2021-06-01 PROCEDURE — 99214 OFFICE O/P EST MOD 30 MIN: CPT

## 2021-06-01 PROCEDURE — 99072 ADDL SUPL MATRL&STAF TM PHE: CPT

## 2021-06-01 RX ORDER — FUROSEMIDE 20 MG/1
20 TABLET ORAL
Refills: 0 | Status: DISCONTINUED | COMMUNITY
End: 2021-06-01

## 2021-06-01 RX ORDER — ATORVASTATIN CALCIUM 40 MG/1
40 TABLET, FILM COATED ORAL
Refills: 0 | Status: ACTIVE | COMMUNITY

## 2021-06-01 RX ORDER — EXENATIDE 2 MG/.65ML
2 INJECTION, SUSPENSION, EXTENDED RELEASE SUBCUTANEOUS
Refills: 0 | Status: DISCONTINUED | COMMUNITY
End: 2021-06-01

## 2021-06-01 RX ORDER — DIAZEPAM 5 MG/1
5 TABLET ORAL
Qty: 2 | Refills: 0 | Status: DISCONTINUED | COMMUNITY
Start: 2020-05-19 | End: 2021-06-01

## 2021-06-01 RX ORDER — SIMVASTATIN 40 MG/1
40 TABLET, FILM COATED ORAL
Refills: 0 | Status: DISCONTINUED | COMMUNITY
End: 2021-06-01

## 2021-06-02 DIAGNOSIS — Z11.59 ENCOUNTER FOR SCREENING FOR OTHER VIRAL DISEASES: ICD-10-CM

## 2021-06-02 NOTE — ASSESSMENT
[FreeTextEntry1] : Patient is 62 yo M with extensive CV risk factors who lately presented with transient dizziness and his brain MRI was negative for acute stroke but his CTA showed bilateral V4 severe stenosis is here for evaluation for possible recanalization of the vertebral arteries. We went over his cerebrovascular imaging in detail with the patient and explained to him the problem. We explained to him that we need to know how is the blood flow in the basilar artery and its main branches, especially PCA and PCOM.\par \par \par PLAN:\par -MRA NOVA scan\par -Avoid dehydration in context of being on HD\par -Avoid hypertension, and hypotension.\par -Maximal medical, risk factor management and Lifestyle modification to be managed by the Stroke clinic and primary physician. \par \par \par \par \par \par \par \par \par \par \par

## 2021-06-02 NOTE — PHYSICAL EXAM
[FreeTextEntry1] : NIH STROKE SCALE \par \par Item	 Score \par \par 1 a.	Level of Consciousness	 0 \par 1 b.          LOC Questions	 0 \par 1 c.	LOC Commands	 0 \par 2.	Best Gaze	 0 \par 3.	Visual	                 0 \par 4.	Facial Palsy              0 \par 5 a.	Motor Arm - Left	 0 \par 5 b.	Motor Arm - Right	 0 \par 6 a.	Motor Leg - Left	 0 \par 6 b.	Motor Leg - Right	 0 \par 7.	Limb Ataxia	 0 \par 8.	Sensory	                 0 \par 9.	Language	 0 \par 10.	Dysarthria	 0 \par 11.	Extinction and Inattention 	 0 \par __________________________________________ \par \par TOTAL	 0 \par \par mRS: 0 No symptoms at all \par \par \par Neurologic Exam: \par \par Mental status: Awake, alert and oriented x 4. Recent and remote memory intact.  \par \par Language: Follows all commands. Naming, repetition and comprehension intact. Attention/concentration intact. No dysarthria, no aphasia. Fund of knowledge appropriate.  \par \par Cranial nerves: Pupils equally round and reactive to light, visual fields full, no nystagmus, EOMI, face symmetric, hearing intact bilaterally, palate elevation symmetric, tongue was midline, sternocleidomastoid/ shoulder shrug strength bilaterally 5/5.  \par \par Motor: No drifting in all extremities. \par \par Sensation: Intact to light touch. No neglect.  \par \par Coordination: No dysmetria on finger-to-nose and heel-to-shin.  \par \par Gait: Ambulates with cane.

## 2021-06-02 NOTE — REASON FOR VISIT
[Follow-Up: _____] : a [unfilled] follow-up visit [FreeTextEntry1] : evaluation for diagnostic cerebral DSA for left VA stenosis referred by Dr. Larua.

## 2021-06-02 NOTE — HISTORY OF PRESENT ILLNESS
[FreeTextEntry1] : Patient is 60 yo M with PMHx of acute ischemic infarcts in the right posterior frontal parietal region in 2019 with residual left sided weakness and ataxic gait, CAD s/p PCI in 2008 and CABG in 2012 , ESRD on HD (M/W/F), DM, HTN, DLD, PAD s/p fem-pop bypass who presents to  clinic for evaluation for diagnostic cerebral DSA referred by Dr. Laura.  \par \par Patient recently presented to the ED on 5/15/21 with episode of dizziness associated with b/l blurred vision. Patient had been experiencing episodes of blurry vision for over one month, without any inciting events, and his vision improves after several hours. In the ED, he was noticed to have right facial droop, however stroke code not activated because patient could not recall when it began. CTH was negative. CTA showed severe short segment stenosis at the origin of the left vertebral artery due to calcified and noncalcified atherosclerotic plaque. Mild stenosis of the proximal left internal carotid artery due to calcified and noncalcified atherosclerotic plaque which is increased since prior CTA of 1/28/2020.  MR head was negative for acute infarcts.  \par \par Today, patient presents to clinic with no new neurological complaints.

## 2021-06-08 ENCOUNTER — LABORATORY RESULT (OUTPATIENT)
Age: 61
End: 2021-06-08

## 2021-06-22 ENCOUNTER — RESULT REVIEW (OUTPATIENT)
Age: 61
End: 2021-06-22

## 2021-06-22 ENCOUNTER — APPOINTMENT (OUTPATIENT)
Dept: MRI IMAGING | Facility: HOSPITAL | Age: 61
End: 2021-06-22

## 2021-06-22 ENCOUNTER — OUTPATIENT (OUTPATIENT)
Dept: OUTPATIENT SERVICES | Facility: HOSPITAL | Age: 61
LOS: 1 days | End: 2021-06-22
Payer: MEDICARE

## 2021-06-22 DIAGNOSIS — Z95.828 PRESENCE OF OTHER VASCULAR IMPLANTS AND GRAFTS: Chronic | ICD-10-CM

## 2021-06-22 DIAGNOSIS — Z95.1 PRESENCE OF AORTOCORONARY BYPASS GRAFT: Chronic | ICD-10-CM

## 2021-06-22 DIAGNOSIS — Z98.890 OTHER SPECIFIED POSTPROCEDURAL STATES: Chronic | ICD-10-CM

## 2021-06-22 PROCEDURE — 70544 MR ANGIOGRAPHY HEAD W/O DYE: CPT | Mod: 26

## 2021-06-22 PROCEDURE — 70544 MR ANGIOGRAPHY HEAD W/O DYE: CPT

## 2021-07-08 ENCOUNTER — APPOINTMENT (OUTPATIENT)
Dept: UROLOGY | Facility: CLINIC | Age: 61
End: 2021-07-08
Payer: MEDICARE

## 2021-07-08 VITALS — BODY MASS INDEX: 29.4 KG/M2 | WEIGHT: 210 LBS | HEIGHT: 71 IN

## 2021-07-08 PROCEDURE — 99072 ADDL SUPL MATRL&STAF TM PHE: CPT

## 2021-07-08 PROCEDURE — 99214 OFFICE O/P EST MOD 30 MIN: CPT

## 2021-07-09 ENCOUNTER — APPOINTMENT (OUTPATIENT)
Dept: VASCULAR SURGERY | Facility: CLINIC | Age: 61
End: 2021-07-09
Payer: MEDICARE

## 2021-07-09 VITALS
SYSTOLIC BLOOD PRESSURE: 106 MMHG | DIASTOLIC BLOOD PRESSURE: 62 MMHG | HEART RATE: 69 BPM | BODY MASS INDEX: 29.26 KG/M2 | HEIGHT: 71 IN | WEIGHT: 209 LBS | TEMPERATURE: 97.6 F

## 2021-07-09 PROCEDURE — 99072 ADDL SUPL MATRL&STAF TM PHE: CPT

## 2021-07-09 PROCEDURE — 93926 LOWER EXTREMITY STUDY: CPT

## 2021-07-09 PROCEDURE — 99214 OFFICE O/P EST MOD 30 MIN: CPT

## 2021-07-09 PROCEDURE — 93880 EXTRACRANIAL BILAT STUDY: CPT

## 2021-07-09 NOTE — CONSULT LETTER
[Dear  ___] : Dear  [unfilled], [Courtesy Letter:] : I had the pleasure of seeing your patient, [unfilled], in my office today. [Please see my note below.] : Please see my note below. [FreeTextEntry2] : Dear Orlando Villa,\par Dear Dr. Aidan Pitts,\par Dear Dr. Kory Ivan,

## 2021-07-17 ENCOUNTER — RESULT REVIEW (OUTPATIENT)
Age: 61
End: 2021-07-17

## 2021-07-17 ENCOUNTER — OUTPATIENT (OUTPATIENT)
Dept: OUTPATIENT SERVICES | Facility: HOSPITAL | Age: 61
LOS: 1 days | Discharge: HOME | End: 2021-07-17
Payer: MEDICARE

## 2021-07-17 DIAGNOSIS — R97.20 ELEVATED PROSTATE SPECIFIC ANTIGEN [PSA]: ICD-10-CM

## 2021-07-17 DIAGNOSIS — Z95.1 PRESENCE OF AORTOCORONARY BYPASS GRAFT: Chronic | ICD-10-CM

## 2021-07-17 DIAGNOSIS — Z95.828 PRESENCE OF OTHER VASCULAR IMPLANTS AND GRAFTS: Chronic | ICD-10-CM

## 2021-07-17 DIAGNOSIS — Z98.890 OTHER SPECIFIED POSTPROCEDURAL STATES: Chronic | ICD-10-CM

## 2021-07-17 PROCEDURE — 72197 MRI PELVIS W/O & W/DYE: CPT | Mod: 26

## 2021-07-20 ENCOUNTER — APPOINTMENT (OUTPATIENT)
Dept: NEUROLOGY | Facility: CLINIC | Age: 61
End: 2021-07-20
Payer: MEDICARE

## 2021-07-20 VITALS
DIASTOLIC BLOOD PRESSURE: 62 MMHG | HEIGHT: 71 IN | SYSTOLIC BLOOD PRESSURE: 103 MMHG | HEART RATE: 63 BPM | WEIGHT: 205 LBS | OXYGEN SATURATION: 99 % | BODY MASS INDEX: 28.7 KG/M2 | TEMPERATURE: 96.9 F

## 2021-07-20 DIAGNOSIS — I65.09 OCCLUSION AND STENOSIS OF UNSPECIFIED VERTEBRAL ARTERY: ICD-10-CM

## 2021-07-20 PROCEDURE — 99215 OFFICE O/P EST HI 40 MIN: CPT

## 2021-07-20 PROCEDURE — 99072 ADDL SUPL MATRL&STAF TM PHE: CPT

## 2021-07-20 RX ORDER — DIAZEPAM 5 MG/1
5 TABLET ORAL
Qty: 1 | Refills: 0 | Status: DISCONTINUED | COMMUNITY
Start: 2021-06-17 | End: 2021-07-20

## 2021-07-20 RX ORDER — DIAZEPAM 5 MG/1
5 TABLET ORAL
Qty: 2 | Refills: 0 | Status: DISCONTINUED | COMMUNITY
Start: 2021-06-04 | End: 2021-07-20

## 2021-07-20 NOTE — PHYSICAL EXAM
[FreeTextEntry1] : He has cyanotic finger nails but not tongue. 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	Level of Consciousness	 0 \par 1 b.          LOC Questions	 0 \par 1 c.	LOC Commands	 0 \par 2.	Best Gaze	 0 \par 3.	Visual	                 0 \par 4.	Facial Palsy              0 \par 5 a.	Motor Arm - Left	 1 \par 5 b.	Motor Arm - Right	 0 \par 6 a.	Motor Leg - Left	 0 \par 6 b.	Motor Leg - Right	 0 \par 7.	Limb Ataxia	 1 \par 8.	Sensory	                 0 \par 9.	Language	 0 \par 10.	Dysarthria	 0 \par 11.	Extinction and Inattention 	 0 \par __________________________________________ \par \par TOTAL	 2 \par \par mRS: 0 No symptoms at all \par \par \par Neurologic Exam: \par \par Mental status: Awake, alert and oriented x 4. Recent and remote memory intact.  \par \par Language: Follows all commands. Naming, repetition and comprehension intact. Attention/concentration intact. No dysarthria, no aphasia. Fund of knowledge appropriate.  \par \par Cranial nerves: Pupils equally round and reactive to light, visual fields full, no nystagmus, EOMI, face symmetric, hearing intact bilaterally, palate elevation symmetric, tongue was midline, sternocleidomastoid/ shoulder shrug strength bilaterally 5/5.  \par \par Motor: No drifting in all extremities. Normal bulk and tone, strength RUE 5/5, LUE 5-/5, RLE 5/5, LLE 5-/5. \par \par Sensation: Intact to light touch. No neglect.  \par \par Coordination: No dysmetria on finger-to-nose and heel-to-shin.  \par \par Gait: mildly limping since he has had a vascular graft on the left leg.\par

## 2021-07-20 NOTE — HISTORY OF PRESENT ILLNESS
[FreeTextEntry1] : Patient is 60 yo M with extensive CV risk factors, ESRD, PAD and acute ischemic infarcts in the Right posterior frontal parietal region in 2019 who presents for follow up of symptomatic bilateral V4 severe stenosis that was recently evaluated with MRA NOVA scanning.  He initially presented to the ED with transient dizziness on 5/2021. While his brain MRI was negative for acute stroke his CTA showed bilateral V4 severe stenosis.  He was last seen in clinic on 6/1/2020 for evaluation for possible recanalization of the vertebral arteries. We recommended MRA NOVA scan to determine blood flow in the basilar artery and its main branches, especially PCA and PCOM. MRA NOVA on 6/22/21 showed nonvisualization of the proximal left V4 segment of the vertebral artery and the following:\par Right vertebral artery equals 241 mL/m \par Left vertebral artery equals 80 mL/m \par Basilar artery equals 194 ml/min\par Right PCA equals 80 mL/min\par Left PCA equals 89 mL/min\par RIGHT P-comm, ICA to PCA flow direction of 56 ml/min. Left PCOM is not visualized/reported.\par \par Today, patient presents to clinic with no new neurological complaints. He denies walking issues or SOB on exertion. He also denies LUE fatigue or any Raynaud's-like issues. He follows with Dr. Galeana for his stroke issues. \par \par

## 2021-07-20 NOTE — DATA REVIEWED
[de-identified] : MRA NOVA 6/22/21 \par \par Right ICA equals 370 mL/m \par Left ICA equals 368 mL/m \par Right MCA equals 198 mL/m \par Left MCA equals 164 mL/m \par Right A1 equals 83 mm/m \par Right A2 equals 78 mL/m \par Left A1 equals 79 mL/m \par Left A2 equals 82 mL/m \par Right vertebral artery equals 241 mL/m \par Left vertebral artery equals 80 mL/m \par Basilar artery equals 194 milliliters per minute \par Right PCA equals 80 mL/m \par RIGHT P-comm equals 56 mm/m \par Left PCA equals 89 mL/m

## 2021-07-20 NOTE — ASSESSMENT
[FreeTextEntry1] : Patient is 62 yo M, never smoker, w/ extensive CV risk factors, ESRD, PAD and acute ischemic infarcts in the Right posterior frontal parietal region in 2019 who presents for follow up of symptomatic bilateral V4 severe stenosis that was recently evaluated with MRA NOVA scanning.  He was last seen in clinic on 6/1/2020 for evaluation for possible recanalization of the vertebral arteries. MRA NOVA on 6/22/21 showed nonvisualization of the proximal left V4 segment of the vertebral artery, BA 194ml/min, Right PCA 80 mL/m, Left PCA 89 mL/m,  mL/min, LVA 80mL/min. There is sufficient flow to the BA and PCAs for his age, per this NOVA scan. Today, patient presents to clinic with no new neurological complaints. He denies walking issues or SOB on exertion. He also denies LUE fatigue or any Raynaud's-like issues. \par \par PLAN:\par -No invasive recanalization of the Vertebral arteries is warranted at this time.\par -Repeat MRA NOVA scan in 9 mo to 1 year.\par -Maximal medical, risk factor management and Lifestyle modification per SAMMPRIS Trial, to be managed by the Stroke clinic and primary physician. \par -Avoid dehydration, hypotension, hypovolemia, anemia or hypoxemia.\par -Instructed patient to call 911 or report to ED if any acute neurological changes occur\par \par \par \par \par \par

## 2021-07-28 ENCOUNTER — NON-APPOINTMENT (OUTPATIENT)
Age: 61
End: 2021-07-28

## 2021-08-13 ENCOUNTER — INPATIENT (INPATIENT)
Facility: HOSPITAL | Age: 61
LOS: 0 days | Discharge: HOME | End: 2021-08-14
Attending: HOSPITALIST | Admitting: HOSPITALIST
Payer: MEDICARE

## 2021-08-13 VITALS
SYSTOLIC BLOOD PRESSURE: 101 MMHG | HEIGHT: 71 IN | HEART RATE: 78 BPM | DIASTOLIC BLOOD PRESSURE: 57 MMHG | WEIGHT: 199.08 LBS | RESPIRATION RATE: 20 BRPM | TEMPERATURE: 99 F | OXYGEN SATURATION: 99 %

## 2021-08-13 DIAGNOSIS — Z98.890 OTHER SPECIFIED POSTPROCEDURAL STATES: Chronic | ICD-10-CM

## 2021-08-13 DIAGNOSIS — Z95.1 PRESENCE OF AORTOCORONARY BYPASS GRAFT: Chronic | ICD-10-CM

## 2021-08-13 DIAGNOSIS — Z95.828 PRESENCE OF OTHER VASCULAR IMPLANTS AND GRAFTS: Chronic | ICD-10-CM

## 2021-08-13 LAB
ALBUMIN SERPL ELPH-MCNC: 4.8 G/DL — SIGNIFICANT CHANGE UP (ref 3.5–5.2)
ALP SERPL-CCNC: 108 U/L — SIGNIFICANT CHANGE UP (ref 30–115)
ALT FLD-CCNC: 10 U/L — SIGNIFICANT CHANGE UP (ref 0–41)
ANION GAP SERPL CALC-SCNC: 14 MMOL/L — SIGNIFICANT CHANGE UP (ref 7–14)
AST SERPL-CCNC: 16 U/L — SIGNIFICANT CHANGE UP (ref 0–41)
BASOPHILS # BLD AUTO: 0.08 K/UL — SIGNIFICANT CHANGE UP (ref 0–0.2)
BASOPHILS NFR BLD AUTO: 1 % — SIGNIFICANT CHANGE UP (ref 0–1)
BILIRUB SERPL-MCNC: 0.3 MG/DL — SIGNIFICANT CHANGE UP (ref 0.2–1.2)
BUN SERPL-MCNC: 23 MG/DL — HIGH (ref 10–20)
CALCIUM SERPL-MCNC: 9.1 MG/DL — SIGNIFICANT CHANGE UP (ref 8.5–10.1)
CHLORIDE SERPL-SCNC: 92 MMOL/L — LOW (ref 98–110)
CO2 SERPL-SCNC: 33 MMOL/L — HIGH (ref 17–32)
CREAT SERPL-MCNC: 5.6 MG/DL — CRITICAL HIGH (ref 0.7–1.5)
EOSINOPHIL # BLD AUTO: 0.17 K/UL — SIGNIFICANT CHANGE UP (ref 0–0.7)
EOSINOPHIL NFR BLD AUTO: 2.1 % — SIGNIFICANT CHANGE UP (ref 0–8)
GLUCOSE SERPL-MCNC: 154 MG/DL — HIGH (ref 70–99)
HCT VFR BLD CALC: 35.5 % — LOW (ref 42–52)
HGB BLD-MCNC: 11.4 G/DL — LOW (ref 14–18)
IMM GRANULOCYTES NFR BLD AUTO: 0.2 % — SIGNIFICANT CHANGE UP (ref 0.1–0.3)
LYMPHOCYTES # BLD AUTO: 1.83 K/UL — SIGNIFICANT CHANGE UP (ref 1.2–3.4)
LYMPHOCYTES # BLD AUTO: 22.3 % — SIGNIFICANT CHANGE UP (ref 20.5–51.1)
MCHC RBC-ENTMCNC: 29.7 PG — SIGNIFICANT CHANGE UP (ref 27–31)
MCHC RBC-ENTMCNC: 32.1 G/DL — SIGNIFICANT CHANGE UP (ref 32–37)
MCV RBC AUTO: 92.4 FL — SIGNIFICANT CHANGE UP (ref 80–94)
MONOCYTES # BLD AUTO: 0.82 K/UL — HIGH (ref 0.1–0.6)
MONOCYTES NFR BLD AUTO: 10 % — HIGH (ref 1.7–9.3)
NEUTROPHILS # BLD AUTO: 5.29 K/UL — SIGNIFICANT CHANGE UP (ref 1.4–6.5)
NEUTROPHILS NFR BLD AUTO: 64.4 % — SIGNIFICANT CHANGE UP (ref 42.2–75.2)
NRBC # BLD: 0 /100 WBCS — SIGNIFICANT CHANGE UP (ref 0–0)
PLATELET # BLD AUTO: 160 K/UL — SIGNIFICANT CHANGE UP (ref 130–400)
POTASSIUM SERPL-MCNC: 3.3 MMOL/L — LOW (ref 3.5–5)
POTASSIUM SERPL-SCNC: 3.3 MMOL/L — LOW (ref 3.5–5)
PROT SERPL-MCNC: 8 G/DL — SIGNIFICANT CHANGE UP (ref 6–8)
RBC # BLD: 3.84 M/UL — LOW (ref 4.7–6.1)
RBC # FLD: 13.5 % — SIGNIFICANT CHANGE UP (ref 11.5–14.5)
SODIUM SERPL-SCNC: 139 MMOL/L — SIGNIFICANT CHANGE UP (ref 135–146)
TROPONIN T SERPL-MCNC: 0.25 NG/ML — CRITICAL HIGH
WBC # BLD: 8.21 K/UL — SIGNIFICANT CHANGE UP (ref 4.8–10.8)
WBC # FLD AUTO: 8.21 K/UL — SIGNIFICANT CHANGE UP (ref 4.8–10.8)

## 2021-08-13 PROCEDURE — 99285 EMERGENCY DEPT VISIT HI MDM: CPT

## 2021-08-13 PROCEDURE — 93010 ELECTROCARDIOGRAM REPORT: CPT

## 2021-08-13 PROCEDURE — 70450 CT HEAD/BRAIN W/O DYE: CPT | Mod: 26,MA

## 2021-08-13 NOTE — CONSULT NOTE ADULT - SUBJECTIVE AND OBJECTIVE BOX
Neurology Consult    Patient is a 61y old  Male who presents with a chief complaint of dizziness    HPI: This is a 60 yo M known to the neurology service as an outpatient for dizziness and symptomatic vertebral artery stenosis. He had a recent MRA NOVA scan which did show stenosis L V4. He saw our neuroendovascular specialist, and the recommendation was for no invasive neurointervention, opting instead for aggressive medical management with dual antiplatelet and high intensity lipid management. The patient endorses compliance with Aspirin, Plavix, and Lipitor.    He had dialysis today (with 2 L removed) and after dialysis he went home. He was having a phone conversation, got out of bed to walk down the jameson, and after he stood up and starting walking down the jameson he developed a room spinning sensation. This lasted only for a few seconds and was not associated with syncope or any focal neurologic deficit. He had one episode yesterday as well, again associated with a position change. He has not had any symptoms at any point during the ED stay.    PAST MEDICAL & SURGICAL HISTORY:  Transient ischemic attack (TIA)  ESRD  Hypertension  CAD w/stent  Myocardial infarction  PAD S/p bypass left leg  HLD   BPH  OA   Chronic anemia    S/P CABG (coronary artery bypass graft)    H/O arterial bypass of lower limb  Left Lower Extremity (2016)    History of surgery  Left CEA (2017)  Left Pinkie toe Amputation (2014)  CABG x 4 (2012)  Card cath - stent (2008)          FAMILY HISTORY:  Family history of heart disease (Father)  DM   ESRD         Social History: (-) x 3    Allergies: No Known Allergies      Home Medications:  aspirin 81 mg oral tablet: 1 tab(s) orally once a day (15 May 2021 18:39)  furosemide 40 mg oral tablet: 1 tab(s) orally once a day (17 May 2021 12:30)  Levemir 100 units/mL subcutaneous solution: 40 unit(s) subcutaneous once a day (at bedtime).  (15 May 2021 18:39)  Metoprolol Tartrate 50 mg oral tablet: 1 tab(s) orally 2 times a day (15 May 2021 18:39)  Plavix 75 mg oral tablet: 1 tab(s) orally once a day (15 May 2021 18:39)        Review of systems:    Constitutional: No fever, weight loss or fatigue    Eyes: No eye pain or discharge  ENMT:  No difficulty hearing; No sinus or throat pain  Neck: No pain or stiffness  Respiratory: No cough, wheezing, chills or hemoptysis  Cardiovascular: No chest pain, palpitations, shortness of breath, dyspnea on exertion  Gastrointestinal: No abdominal pain, nausea, vomiting or hematemesis; No diarrhea or constipation.   Genitourinary: No dysuria, frequency, hematuria or incontinence  Neurological: As per HPI  Skin: No rashes or lesions   Endocrine: No heat or cold intolerance; No hair loss  Musculoskeletal: No joint pain or swelling  Psychiatric: No depression, anxiety, mood swings  Heme/Lymph: No easy bruising or bleeding gums    Vital Signs Last 24 Hrs  T(C): 37.1 (13 Aug 2021 21:27), Max: 37.1 (13 Aug 2021 21:27)  T(F): 98.8 (13 Aug 2021 21:27), Max: 98.8 (13 Aug 2021 21:27)  HR: 78 (13 Aug 2021 21:27) (78 - 78)  BP: 101/57 (13 Aug 2021 21:27) (101/57 - 101/57)  BP(mean): --  RR: 20 (13 Aug 2021 21:27) (20 - 20)  SpO2: 99% (13 Aug 2021 21:27) (99% - 99%)    Neurologic Examination:  Mentation: Awake, alert, oriented to person, place, and time. Follows complex, multiple part commands. No neglect.  Language: Speech is clear and fluent.  Cranial Nerves:  	II – Visual fields full.  	III/IV/VI – Pupils 3 mm. PERRL. EOMI.  	V – Grossly intact sensation.  	VII – No facial palsy.  	VIII – No nystagmus.  	IX/X – Symmetric palate rise. Uvula midline.  	XI – SCM strong b/l.  	XII – Tongue protrusion midline.  Motor: 5/5 strength b/l. Normal bulk and tone.  Sensory: Grossly intact.  Reflexes: 2+ generally.  Cerebellum: No dysmetria. Gait with left leg limp, same as that which was mentioned in outpatient records.    Labs:                         11.4   8.21  )-----------( 160      ( 13 Aug 2021 22:03 )             35.5       08-13    139  |  92<L>  |  23<H>  ----------------------------<  154<H>  3.3<L>   |  33<H>  |  5.6<HH>    Ca    9.1      13 Aug 2021 22:03    TPro  8.0  /  Alb  4.8  /  TBili  0.3  /  DBili  x   /  AST  16  /  ALT  10  /  AlkPhos  108  08-13    LIVER FUNCTIONS - ( 13 Aug 2021 22:03 )  Alb: 4.8 g/dL / Pro: 8.0 g/dL / ALK PHOS: 108 U/L / ALT: 10 U/L / AST: 16 U/L / GGT: x                   Neuroimaging:    MRI Brain NC:  < from: MR Head No Cont (05.16.21 @ 19:15) >  IMPRESSION:    1.  No evidence of recent infarct or acute intracranial hemorrhage.    2.  Mild chronic microvascular changes.    3.  Persistent right mastoid opacification and trace left mastoid fluid.    < end of copied text >    MRA Head/Neck:  < from: MR Angio Head No Cont (06.22.21 @ 16:43) >  NOVA was performed utilizing traditional time-of-flight (TOF MRA) and phase contrast magnetic resonance imaging (PCMR) to produce a 3D model of the vasculature and quantify vessel blood flow    Right ICA equals 370 mL/m    Left ICA equals 368 mL/m    Right MCA equals 198 mL/m    Left MCA equals 164 mL/m    Right A1 equals 83 mm/m    Right A2 equals 78 mL/m    Left A1 equals 79 mL/m    Left A2 equals 82 mL/m    Right vertebral artery equals 241 mL/m    Left vertebral artery equals 80 mL/m    Basilar artery equals 194 milliliters per minute    Right PCA equals 80 mL/m    RIGHT P-comm equals 56 mm/m    Left PCA equals 89 mL/m    Findings: Comparison is made to a prior MRA of the brain performed on 8/27/2018.    There is no evidence of acute infarction. There are mild patchy and a few punctate foci of flair signal hyperintensities that may represent mild microvascular disease in a patient of this age. There is near complete opacification of the right mastoid air cells consistent with mastoiditis or effusion.    Evaluation of the anterior circulation demonstrates mild narrowing of the left supraclinoid internal carotid artery, unchanged. Otherwise there is normal course and caliber of the distal internal carotid arteries. There is a normal appearance to the bilateral anterior cerebral and middle cerebral arteries.    Evaluation the posterior circulation demonstrates mild to moderate narrowing of the distal left V4 segment of the vertebral artery, unchanged. Otherwise there is normal course and caliber of the bilateral vertebral arteries, vertebrobasilar junction and basilar artery. The bilateral posterior cerebral arteries are also within normal limits. There is a right posterior communicating artery and a hypoplastic left posterior communicating artery. There is no evidence of aneurysm  .    Impression: Mild narrowing of the left supraclinoid internal carotid artery, unchanged. Mild to moderate narrowing of the distal left V4 segment of the vertebral artery, unchanged.    < end of copied text >      Assessment:      - Orthostasis  - L V4 stenosis    Plan:   - No inpatient neurologic investigation is required at this time.  - Repeat MRA NOVA scan in 9 months as directed at recent outpatient visit.  - Follow up with neurologic in clinic. Patient to call for appointment.  - Further management as per ED    The patient on my examination was orthostatic (but now asymptomatic). Sitting ---> Standing . The HPI and neurologic exam were inconsistent with acute stroke. In the setting of dialysis today and known vertebral artery stenosis, this is the most likely reason for his vertigo. Today's episode was very transient, only lasting a few seconds. Clinically he appears very well. He has had similar symptoms previously and underwent work up with MRA NOVA scan with subsequent recommendation for medical management only with follow up imaging in 9 months.    The patient was encouraged to remain well hydrated, particularly on dialysis days. He was further encouraged to dangle the legs over the side of the bed and remain there for about one minute or so before getting up to avoid orthostatic hypotension.       Neurology Consult    Patient is a 61y old  Male who presents with a chief complaint of dizziness    HPI: This is a 60 yo M known to the neurology service as an outpatient for dizziness and symptomatic vertebral artery stenosis. He had a recent MRA NOVA scan which did show stenosis L V4. He saw our neuroendovascular specialist, and the recommendation was for no invasive neurointervention, opting instead for aggressive medical management with dual antiplatelet and high intensity lipid management. The patient endorses compliance with Aspirin, Plavix, and Lipitor.    He had dialysis today (with 2 L removed) and after dialysis he went home. He was having a phone conversation, got out of bed to walk down the jameson, and after he stood up and starting walking down the jameson he developed a room spinning sensation. This lasted only for a few seconds and was not associated with syncope or any focal neurologic deficit. He had one episode yesterday as well, again associated with a position change. He has not had any symptoms at any point during the ED stay.    PAST MEDICAL & SURGICAL HISTORY:  Transient ischemic attack (TIA)  ESRD  Hypertension  CAD w/stent  Myocardial infarction  PAD S/p bypass left leg  HLD   BPH  OA   Chronic anemia    S/P CABG (coronary artery bypass graft)    H/O arterial bypass of lower limb  Left Lower Extremity (2016)    History of surgery  Left CEA (2017)  Left Pinkie toe Amputation (2014)  CABG x 4 (2012)  Card cath - stent (2008)          FAMILY HISTORY:  Family history of heart disease (Father)  DM   ESRD         Social History: (-) x 3    Allergies: No Known Allergies      Home Medications:  aspirin 81 mg oral tablet: 1 tab(s) orally once a day (15 May 2021 18:39)  furosemide 40 mg oral tablet: 1 tab(s) orally once a day (17 May 2021 12:30)  Levemir 100 units/mL subcutaneous solution: 40 unit(s) subcutaneous once a day (at bedtime).  (15 May 2021 18:39)  Metoprolol Tartrate 50 mg oral tablet: 1 tab(s) orally 2 times a day (15 May 2021 18:39)  Plavix 75 mg oral tablet: 1 tab(s) orally once a day (15 May 2021 18:39)        Review of systems:    Constitutional: No fever, weight loss or fatigue    Eyes: No eye pain or discharge  ENMT:  No difficulty hearing; No sinus or throat pain  Neck: No pain or stiffness  Respiratory: No cough, wheezing, chills or hemoptysis  Cardiovascular: No chest pain, palpitations, shortness of breath, dyspnea on exertion  Gastrointestinal: No abdominal pain, nausea, vomiting or hematemesis; No diarrhea or constipation.   Genitourinary: No dysuria, frequency, hematuria or incontinence  Neurological: As per HPI  Skin: No rashes or lesions   Endocrine: No heat or cold intolerance; No hair loss  Musculoskeletal: No joint pain or swelling  Psychiatric: No depression, anxiety, mood swings  Heme/Lymph: No easy bruising or bleeding gums    Vital Signs Last 24 Hrs  T(C): 37.1 (13 Aug 2021 21:27), Max: 37.1 (13 Aug 2021 21:27)  T(F): 98.8 (13 Aug 2021 21:27), Max: 98.8 (13 Aug 2021 21:27)  HR: 78 (13 Aug 2021 21:27) (78 - 78)  BP: 101/57 (13 Aug 2021 21:27) (101/57 - 101/57)  BP(mean): --  RR: 20 (13 Aug 2021 21:27) (20 - 20)  SpO2: 99% (13 Aug 2021 21:27) (99% - 99%)    Neurologic Examination:  Mentation: Awake, alert, oriented to person, place, and time. Follows complex, multiple part commands. No neglect.  Language: Speech is clear and fluent.  Cranial Nerves:  	II – Visual fields full.  	III/IV/VI – Pupils 3 mm. PERRL. EOMI.  	V – Grossly intact sensation.  	VII – No facial palsy.  	VIII – No nystagmus.  	IX/X – Symmetric palate rise. Uvula midline.  	XI – SCM strong b/l.  	XII – Tongue protrusion midline.  Motor: 5/5 strength b/l. Normal bulk and tone.  Sensory: Grossly intact.  Reflexes: 2+ generally.  Cerebellum: No dysmetria. Gait with left leg limp, same as that which was mentioned in outpatient records.    Labs:                         11.4   8.21  )-----------( 160      ( 13 Aug 2021 22:03 )             35.5       08-13    139  |  92<L>  |  23<H>  ----------------------------<  154<H>  3.3<L>   |  33<H>  |  5.6<HH>    Ca    9.1      13 Aug 2021 22:03    TPro  8.0  /  Alb  4.8  /  TBili  0.3  /  DBili  x   /  AST  16  /  ALT  10  /  AlkPhos  108  08-13    LIVER FUNCTIONS - ( 13 Aug 2021 22:03 )  Alb: 4.8 g/dL / Pro: 8.0 g/dL / ALK PHOS: 108 U/L / ALT: 10 U/L / AST: 16 U/L / GGT: x                   Neuroimaging:    MRI Brain NC:  < from: MR Head No Cont (05.16.21 @ 19:15) >  IMPRESSION:    1.  No evidence of recent infarct or acute intracranial hemorrhage.    2.  Mild chronic microvascular changes.    3.  Persistent right mastoid opacification and trace left mastoid fluid.    < end of copied text >    MRA Head/Neck:  < from: MR Angio Head No Cont (06.22.21 @ 16:43) >  NOVA was performed utilizing traditional time-of-flight (TOF MRA) and phase contrast magnetic resonance imaging (PCMR) to produce a 3D model of the vasculature and quantify vessel blood flow    Right ICA equals 370 mL/m    Left ICA equals 368 mL/m    Right MCA equals 198 mL/m    Left MCA equals 164 mL/m    Right A1 equals 83 mm/m    Right A2 equals 78 mL/m    Left A1 equals 79 mL/m    Left A2 equals 82 mL/m    Right vertebral artery equals 241 mL/m    Left vertebral artery equals 80 mL/m    Basilar artery equals 194 milliliters per minute    Right PCA equals 80 mL/m    RIGHT P-comm equals 56 mm/m    Left PCA equals 89 mL/m    Findings: Comparison is made to a prior MRA of the brain performed on 8/27/2018.    There is no evidence of acute infarction. There are mild patchy and a few punctate foci of flair signal hyperintensities that may represent mild microvascular disease in a patient of this age. There is near complete opacification of the right mastoid air cells consistent with mastoiditis or effusion.    Evaluation of the anterior circulation demonstrates mild narrowing of the left supraclinoid internal carotid artery, unchanged. Otherwise there is normal course and caliber of the distal internal carotid arteries. There is a normal appearance to the bilateral anterior cerebral and middle cerebral arteries.    Evaluation the posterior circulation demonstrates mild to moderate narrowing of the distal left V4 segment of the vertebral artery, unchanged. Otherwise there is normal course and caliber of the bilateral vertebral arteries, vertebrobasilar junction and basilar artery. The bilateral posterior cerebral arteries are also within normal limits. There is a right posterior communicating artery and a hypoplastic left posterior communicating artery. There is no evidence of aneurysm  .    Impression: Mild narrowing of the left supraclinoid internal carotid artery, unchanged. Mild to moderate narrowing of the distal left V4 segment of the vertebral artery, unchanged.    < end of copied text >      Assessment:      - Orthostasis  - L V4 stenosis    Plan:   - No inpatient neurologic investigation is required at this time.  - Repeat MRA NOVA scan in 9 months as directed at recent outpatient visit.  - Follow up with neurology in clinic. Patient to call for appointment.  - Further management as per ED    The patient on my examination was orthostatic (but now asymptomatic). Sitting ---> Standing . The HPI and neurologic exam were inconsistent with acute stroke. In the setting of dialysis today and known vertebral artery stenosis, this is the most likely reason for his vertigo. Today's episode was very transient, only lasting a few seconds. Clinically he appears very well. He has had similar symptoms previously and underwent work up with MRA NOVA scan with subsequent recommendation for medical management only with follow up imaging in 9 months.    The patient was encouraged to remain well hydrated, particularly on dialysis days. He was further encouraged to dangle the legs over the side of the bed and remain there for about one minute or so before getting up to avoid orthostatic hypotension.    Further care as per ED. Please reach out for any questions.

## 2021-08-13 NOTE — ED ADULT TRIAGE NOTE - CHIEF COMPLAINT QUOTE
patient c.o dizziness and balance issues, fell 1 time yesterday and 1 time today, did not hit ground either time, - LOC, - HT. denies pain. states it feels like someone pushed him and he lost his balance

## 2021-08-14 ENCOUNTER — TRANSCRIPTION ENCOUNTER (OUTPATIENT)
Age: 61
End: 2021-08-14

## 2021-08-14 VITALS
OXYGEN SATURATION: 97 % | DIASTOLIC BLOOD PRESSURE: 62 MMHG | HEART RATE: 69 BPM | TEMPERATURE: 97 F | SYSTOLIC BLOOD PRESSURE: 134 MMHG | RESPIRATION RATE: 18 BRPM | HEIGHT: 67 IN | WEIGHT: 196.43 LBS

## 2021-08-14 LAB
ALBUMIN SERPL ELPH-MCNC: 4.5 G/DL — SIGNIFICANT CHANGE UP (ref 3.5–5.2)
ALP SERPL-CCNC: 106 U/L — SIGNIFICANT CHANGE UP (ref 30–115)
ALT FLD-CCNC: 11 U/L — SIGNIFICANT CHANGE UP (ref 0–41)
ANION GAP SERPL CALC-SCNC: 17 MMOL/L — HIGH (ref 7–14)
AST SERPL-CCNC: 17 U/L — SIGNIFICANT CHANGE UP (ref 0–41)
BASOPHILS # BLD AUTO: 0.08 K/UL — SIGNIFICANT CHANGE UP (ref 0–0.2)
BASOPHILS NFR BLD AUTO: 0.9 % — SIGNIFICANT CHANGE UP (ref 0–1)
BILIRUB SERPL-MCNC: 0.3 MG/DL — SIGNIFICANT CHANGE UP (ref 0.2–1.2)
BUN SERPL-MCNC: 27 MG/DL — HIGH (ref 10–20)
CALCIUM SERPL-MCNC: 9 MG/DL — SIGNIFICANT CHANGE UP (ref 8.5–10.1)
CHLORIDE SERPL-SCNC: 94 MMOL/L — LOW (ref 98–110)
CO2 SERPL-SCNC: 30 MMOL/L — SIGNIFICANT CHANGE UP (ref 17–32)
CREAT SERPL-MCNC: 6.1 MG/DL — CRITICAL HIGH (ref 0.7–1.5)
EOSINOPHIL # BLD AUTO: 0.19 K/UL — SIGNIFICANT CHANGE UP (ref 0–0.7)
EOSINOPHIL NFR BLD AUTO: 2.1 % — SIGNIFICANT CHANGE UP (ref 0–8)
GLUCOSE SERPL-MCNC: 140 MG/DL — HIGH (ref 70–99)
HCT VFR BLD CALC: 35.8 % — LOW (ref 42–52)
HGB BLD-MCNC: 11.5 G/DL — LOW (ref 14–18)
IMM GRANULOCYTES NFR BLD AUTO: 0.4 % — HIGH (ref 0.1–0.3)
LYMPHOCYTES # BLD AUTO: 1.98 K/UL — SIGNIFICANT CHANGE UP (ref 1.2–3.4)
LYMPHOCYTES # BLD AUTO: 22 % — SIGNIFICANT CHANGE UP (ref 20.5–51.1)
MAGNESIUM SERPL-MCNC: 2.3 MG/DL — SIGNIFICANT CHANGE UP (ref 1.8–2.4)
MCHC RBC-ENTMCNC: 30.5 PG — SIGNIFICANT CHANGE UP (ref 27–31)
MCHC RBC-ENTMCNC: 32.1 G/DL — SIGNIFICANT CHANGE UP (ref 32–37)
MCV RBC AUTO: 95 FL — HIGH (ref 80–94)
MONOCYTES # BLD AUTO: 0.76 K/UL — HIGH (ref 0.1–0.6)
MONOCYTES NFR BLD AUTO: 8.4 % — SIGNIFICANT CHANGE UP (ref 1.7–9.3)
NEUTROPHILS # BLD AUTO: 5.96 K/UL — SIGNIFICANT CHANGE UP (ref 1.4–6.5)
NEUTROPHILS NFR BLD AUTO: 66.2 % — SIGNIFICANT CHANGE UP (ref 42.2–75.2)
NRBC # BLD: 0 /100 WBCS — SIGNIFICANT CHANGE UP (ref 0–0)
PLATELET # BLD AUTO: 146 K/UL — SIGNIFICANT CHANGE UP (ref 130–400)
POTASSIUM SERPL-MCNC: 3.7 MMOL/L — SIGNIFICANT CHANGE UP (ref 3.5–5)
POTASSIUM SERPL-SCNC: 3.7 MMOL/L — SIGNIFICANT CHANGE UP (ref 3.5–5)
PROT SERPL-MCNC: 7.7 G/DL — SIGNIFICANT CHANGE UP (ref 6–8)
RBC # BLD: 3.77 M/UL — LOW (ref 4.7–6.1)
RBC # FLD: 13.4 % — SIGNIFICANT CHANGE UP (ref 11.5–14.5)
SARS-COV-2 RNA SPEC QL NAA+PROBE: SIGNIFICANT CHANGE UP
SODIUM SERPL-SCNC: 141 MMOL/L — SIGNIFICANT CHANGE UP (ref 135–146)
WBC # BLD: 9.01 K/UL — SIGNIFICANT CHANGE UP (ref 4.8–10.8)
WBC # FLD AUTO: 9.01 K/UL — SIGNIFICANT CHANGE UP (ref 4.8–10.8)

## 2021-08-14 PROCEDURE — 99232 SBSQ HOSP IP/OBS MODERATE 35: CPT

## 2021-08-14 PROCEDURE — 71045 X-RAY EXAM CHEST 1 VIEW: CPT | Mod: 26

## 2021-08-14 NOTE — H&P ADULT - NSHPSOCIALHISTORY_GEN_ALL_CORE
Substance Use (street drugs): ( x ) never used   Tobacco Usage:  ( x  ) never smoked     Alcohol Usage: None

## 2021-08-14 NOTE — DISCHARGE NOTE PROVIDER - PROVIDER TOKENS
PROVIDER:[TOKEN:[[id number]],FOLLOWUP:[2 weeks]],PROVIDER:[TOKEN:[[id number]],FOLLOWUP:[Routine]]

## 2021-08-14 NOTE — DISCHARGE NOTE PROVIDER - HOSPITAL COURSE
62 yo M with PMHx of CAD, s/p staged PCI CABG 2012, ESRD on HD M/W/F, Normocytic Anemia of Chronic Disease, DM Type II, Hypertension, DLD presented to the ED for Dizziness. He had dialysis yesterday (with 2 L removed) and after dialysis he went home. He was having a phone conversation, got out of bed to walk down the jameson, and after he stood up and starting walking down the jameson he developed a room spinning sensation. This lasted only for a few seconds and was not associated with syncope or any focal neurologic deficit. He had one episode yesterday as well, again associated with a position change. He has not had any symptoms at any point during the ED stay.    Known to the neurology service as an outpatient for dizziness and symptomatic vertebral artery stenosis. He had a recent MRA NOVA scan which did show stenosis L V4. He saw our neuroendovascular specialist, and the recommendation was for no invasive neurointervention, opting instead for aggressive medical management with dual antiplatelet and high intensity lipid management. The patient endorses compliance with Aspirin, Plavix, and Lipitor.    62 yo M with PMHx of CAD, s/p staged PCI CABG 2012, ESRD on HD M/W/F, Normocytic Anemia of Chronic Disease, DM Type II, Hypertension, DLD presented to the ED for Dizziness.     #dizziness likely due to positive orthostatics. no acute stroke  - dialysis yesterday  - cleared by neuro. no further nuero work up   orthostatic ---> Standing   - hold nifidipine     #tropinemia - stable  - no active chest pain  - in setting of esrd    # L V4 stenosis  - Repeat MRA NOVA scan in 9 months as directed at recent outpatient visit.  - Follow up with neurology in clinic. Patient to call for appointment.  - Further management as per ED

## 2021-08-14 NOTE — H&P ADULT - HISTORY OF PRESENT ILLNESS
62 yo M with PMHx of CAD, s/p staged PCI CABG 2012, ESRD on HD M/W/F, Normocytic Anemia of Chronic Disease, DM Type II, Hypertension, DLD presented to the ED for Dizziness. He had dialysis yesterday (with 2 L removed) and after dialysis he went home. He was having a phone conversation, got out of bed to walk down the jameson, and after he stood up and starting walking down the jameson he developed a room spinning sensation. This lasted only for a few seconds and was not associated with syncope or any focal neurologic deficit. He had one episode yesterday as well, again associated with a position change. He has not had any symptoms at any point during the ED stay.    Known to the neurology service as an outpatient for dizziness and symptomatic vertebral artery stenosis. He had a recent MRA NOVA scan which did show stenosis L V4. He saw our neuroendovascular specialist, and the recommendation was for no invasive neurointervention, opting instead for aggressive medical management with dual antiplatelet and high intensity lipid management. The patient endorses compliance with Aspirin, Plavix, and Lipitor.

## 2021-08-14 NOTE — DISCHARGE NOTE PROVIDER - NSDCCPCAREPLAN_GEN_ALL_CORE_FT
PRINCIPAL DISCHARGE DIAGNOSIS  Diagnosis: Lightheadedness  Assessment and Plan of Treatment: you were lightheaded due to abrupt changes in your Blood pressure due to changes in your position. You were educated about positional changes to help with your symptoms. Your BP medication will be stopped. Please follow up with your nephrologist for further management of your hypertension. Please make an appointment with the neurology clinic.      SECONDARY DISCHARGE DIAGNOSES  Diagnosis: Elevated troponin  Assessment and Plan of Treatment:

## 2021-08-14 NOTE — ED PROVIDER NOTE - OBJECTIVE STATEMENT
61 year old male with pmhx of esrd, on hd mw f, cad, htn, bph, dm, presents with dizziness and lightheadedness x 2 days. pt admits feels off balance when he ambulates. No chest pain, sob, abd pain, nausea, vomiting, diarrhea, ha, visual changes, fall or head trauma. pt has been following up with neurology in [place] , had MRI recently which showed vertebral artery stenosis, which he saw a neuroendovascular doctor, and no acute intervention is needed.

## 2021-08-14 NOTE — H&P ADULT - NSICDXPASTMEDICALHX_GEN_ALL_CORE_FT
PAST MEDICAL HISTORY:  BPH (benign prostatic hyperplasia)     CAD (coronary artery disease)     Chronic anemia     Chronic kidney disease (CKD) Stage IV    Diabetes mellitus     HLD (hyperlipidemia)     [place] (hard of hearing)     Hypertension     Myocardial infarction 2012    OA (osteoarthritis)     PAD (peripheral artery disease) S/p bypass left leg    Pain in left knee s/p fall    S/P CABG (coronary artery bypass graft) x4    Stented coronary artery in 2008    Transient ischemic attack (TIA) 2017; 2008

## 2021-08-14 NOTE — DISCHARGE NOTE PROVIDER - CARE PROVIDER_API CALL
Orlando Sandoval)  [address]  [address] [place] [address]  Phone: [phone]  Fax: [phone]  Follow Up Time: 2 weeks    Landen Rogers)  EEGEpilepsy; Neurology  [address] [facility] [address]  [place] [address]  Phone: [phone]  Fax: [phone]  Follow Up Time: Routine

## 2021-08-14 NOTE — DISCHARGE NOTE NURSING/CASE MANAGEMENT/SOCIAL WORK - PATIENT PORTAL LINK FT
You can access the FollowMyHealth Patient Portal offered by [facility] by registering at the following website: [email or web address]. By joining [facility]’s FollowMyHealth portal, you will also be able to view your health information using other applications (apps) compatible with our system.

## 2021-08-14 NOTE — H&P ADULT - NSHPPHYSICALEXAM_GEN_ALL_CORE
GENERAL: NAD, lying in bed comfortably  CHEST/LUNG: Clear to auscultation bilaterally; Unlabored respirations  HEART: Regular rate and rhythm; No murmurs, rubs, or gallops  ABDOMEN: Bowel sounds present; Soft, Nontender, Nondistended. `  EXTREMITIES:  2+ Peripheral Pulses, brisk capillary refill. No clubbing, cyanosis, or edema  NERVOUS SYSTEM:  Alert & Oriented X3, speech clear. No deficits   MSK: FROM all 4 extremities, full and equal strength  SKIN: No rashes or lesions

## 2021-08-14 NOTE — H&P ADULT - ASSESSMENT
60 yo M with PMHx of CAD, s/p staged PCI CABG 2012, ESRD on HD M/W/F, Normocytic Anemia of Chronic Disease, DM Type II, Hypertension, DLD presented to the ED for Dizziness.     #dizziness likely due to positive orthostatics. no acute stroke  - dialysis yesterday  - cleared by neuro. no further nuero work up   orthostatic ---> Standing     # L V4 stenosis  - Repeat MRA NOVA scan in 9 months as directed at recent outpatient visit.  - Follow up with neurology in clinic. Patient to call for appointment.  - Further management as per ED    #CAD, s/p staged PCI  #CABG 2012  - c/w ASA  - c/w metoprolol     #ESRD on HD M/W/F  - K stable  - nephrology consult for HD  - Continue Sevelamer TID  - continue Lasix 40 mg daily    #Normocytic Anemia of Chronic Disease  - likely 2/2 ESRD  - hb stable at 10  - no evidence of overt bleeding    #DM Type II  - on levamir 40 units HS  - FS AC HS  - will start basal bolus    #Hypertension  - c/w metoprolol, lasix    #DLD  - c/w statin    CODE; full  Diet: renal diet  DVT ppx: heparin  Dispo: possible dc today or patricia    60 yo M with PMHx of CAD, s/p staged PCI CABG 2012, ESRD on HD M/W/F, Normocytic Anemia of Chronic Disease, DM Type II, Hypertension, DLD presented to the ED for Dizziness.     #dizziness likely due to positive orthostatics. no acute stroke  - dialysis yesterday  - cleared by neuro. no further nuero work up   orthostatic ---> Standing     #tropinemia - stable  - no active chest pain  - in setting of esrd    # L V4 stenosis  - Repeat MRA NOVA scan in 9 months as directed at recent outpatient visit.  - Follow up with neurology in clinic. Patient to call for appointment.  - Further management as per ED    #CAD, s/p staged PCI  #CABG 2012  - c/w ASA  - c/w metoprolol     #ESRD on HD M/W/F  - K stable  - nephrology consult for HD  - Continue Sevelamer TID  - continue Lasix 40 mg daily    #Normocytic Anemia of Chronic Disease  - likely 2/2 ESRD  - hb stable at 10  - no evidence of overt bleeding    #DM Type II  - on levamir 40 units HS  - FS AC HS  - will start basal bolus    #Hypertension  - c/w metoprolol, lasix  - hold nifedipine     #DLD  - c/w statin    CODE; full  Diet: renal diet  DVT ppx: heparin  Dispo: possible dc today or patricia

## 2021-08-14 NOTE — H&P ADULT - ATTENDING COMMENTS
62 yo M with PMHx of CAD, s/p staged PCI CABG 2012, ESRD on HD M/W/F, Normocytic Anemia of Chronic Disease, DM Type II, Hypertension, DLD presented to the ED for Dizziness. Neurology evaluated patient in ED and noted this is a chronic issue that they follow. Additionally he was found to have elevated tropes but these are chronically elevated at this level in the setting of his CKD. Denies any chest pain. No ST-T wave changes c/w ischemia.

## 2021-08-14 NOTE — DISCHARGE NOTE PROVIDER - NSDCMRMEDTOKEN_GEN_ALL_CORE_FT
aspirin 81 mg oral tablet: 1 tab(s) orally once a day  atorvastatin 40 mg oral tablet: 1 tab(s) orally once a day   furosemide 40 mg oral tablet: 1 tab(s) orally once a day  Levemir 100 units/mL subcutaneous solution: 40 unit(s) subcutaneous once a day (at bedtime).   Metoprolol Tartrate 50 mg oral tablet: 1 tab(s) orally 2 times a day  Plavix 75 mg oral tablet: 1 tab(s) orally once a day  sevelamer carbonate 800 mg oral tablet: 1 tab(s) orally 3 times a day (with meals)  Trulicity Pen: 1 milligram(s) subcutaneous once a week

## 2021-08-14 NOTE — ED PROVIDER NOTE - ATTENDING CONTRIBUTION TO CARE
Patient presents to ED c/o feeling lightheaded/weak and imbalance. No trauma.   Vitals reviewed.   Lungs: CTA,   abd: +BS, NT, ND, soft,   CNS: awake, alert, no focal neurologic deficits.   A/P: presyncope,  imbalance,   labs, imaging, reevaluation.

## 2021-08-14 NOTE — ED ADULT NURSE NOTE - NSIMPLEMENTINTERV_GEN_ALL_ED
Implemented All Universal Safety Interventions:  [place] to call system. Call bell, personal items and telephone within reach. Instruct patient to call for assistance. Room bathroom lighting operational. Non-slip footwear when patient is off stretcher. Physically safe environment: no spills, clutter or unnecessary equipment. Stretcher in lowest position, wheels locked, appropriate side rails in place.

## 2021-08-19 DIAGNOSIS — Z20.822 CONTACT WITH AND (SUSPECTED) EXPOSURE TO COVID-19: ICD-10-CM

## 2021-08-19 DIAGNOSIS — Z95.1 PRESENCE OF AORTOCORONARY BYPASS GRAFT: ICD-10-CM

## 2021-08-19 DIAGNOSIS — E11.22 TYPE 2 DIABETES MELLITUS WITH DIABETIC CHRONIC KIDNEY DISEASE: ICD-10-CM

## 2021-08-19 DIAGNOSIS — I12.0 HYPERTENSIVE CHRONIC KIDNEY DISEASE WITH STAGE 5 CHRONIC KIDNEY DISEASE OR END STAGE RENAL DISEASE: ICD-10-CM

## 2021-08-19 DIAGNOSIS — N40.0 BENIGN PROSTATIC HYPERPLASIA WITHOUT LOWER URINARY TRACT SYMPTOMS: ICD-10-CM

## 2021-08-19 DIAGNOSIS — I25.10 ATHEROSCLEROTIC HEART DISEASE OF NATIVE CORONARY ARTERY WITHOUT ANGINA PECTORIS: ICD-10-CM

## 2021-08-19 DIAGNOSIS — N18.6 END STAGE RENAL DISEASE: ICD-10-CM

## 2021-08-19 DIAGNOSIS — R42 DIZZINESS AND GIDDINESS: ICD-10-CM

## 2021-08-19 DIAGNOSIS — D63.8 ANEMIA IN OTHER CHRONIC DISEASES CLASSIFIED ELSEWHERE: ICD-10-CM

## 2021-08-19 DIAGNOSIS — R77.8 OTHER SPECIFIED ABNORMALITIES OF PLASMA PROTEINS: ICD-10-CM

## 2021-08-25 ENCOUNTER — APPOINTMENT (OUTPATIENT)
Dept: UROLOGY | Facility: CLINIC | Age: 61
End: 2021-08-25

## 2021-08-30 ENCOUNTER — OUTPATIENT (OUTPATIENT)
Dept: OUTPATIENT SERVICES | Facility: HOSPITAL | Age: 61
LOS: 1 days | Discharge: HOME | End: 2021-08-30
Payer: COMMERCIAL

## 2021-08-30 DIAGNOSIS — Z95.1 PRESENCE OF AORTOCORONARY BYPASS GRAFT: Chronic | ICD-10-CM

## 2021-08-30 DIAGNOSIS — R05 COUGH: ICD-10-CM

## 2021-08-30 DIAGNOSIS — Z95.828 PRESENCE OF OTHER VASCULAR IMPLANTS AND GRAFTS: Chronic | ICD-10-CM

## 2021-08-30 DIAGNOSIS — Z98.890 OTHER SPECIFIED POSTPROCEDURAL STATES: Chronic | ICD-10-CM

## 2021-08-30 PROCEDURE — 71046 X-RAY EXAM CHEST 2 VIEWS: CPT | Mod: 26

## 2022-01-13 ENCOUNTER — APPOINTMENT (OUTPATIENT)
Dept: UROLOGY | Facility: CLINIC | Age: 62
End: 2022-01-13

## 2022-01-27 NOTE — PRE-ANESTHESIA EVALUATION ADULT - NSANTHAPLANRD_GEN_ALL_CORE
Lara Almanzar (:  2009) is a 11 LUIS MIGUEL Sevilla - CNP general/monitored anesthesia care (MAC)

## 2022-01-28 ENCOUNTER — APPOINTMENT (OUTPATIENT)
Dept: VASCULAR SURGERY | Facility: CLINIC | Age: 62
End: 2022-01-28
Payer: MEDICARE

## 2022-01-28 VITALS — SYSTOLIC BLOOD PRESSURE: 150 MMHG | DIASTOLIC BLOOD PRESSURE: 80 MMHG

## 2022-01-28 PROCEDURE — 93880 EXTRACRANIAL BILAT STUDY: CPT

## 2022-01-28 PROCEDURE — 93926 LOWER EXTREMITY STUDY: CPT

## 2022-01-28 PROCEDURE — 99214 OFFICE O/P EST MOD 30 MIN: CPT

## 2022-02-21 ENCOUNTER — INPATIENT (INPATIENT)
Facility: HOSPITAL | Age: 62
LOS: 8 days | Discharge: HOME HEALTH | End: 2022-03-02
Attending: HOSPITALIST | Admitting: STUDENT IN AN ORGANIZED HEALTH CARE EDUCATION/TRAINING PROGRAM
Payer: MEDICARE

## 2022-02-21 VITALS
RESPIRATION RATE: 18 BRPM | HEART RATE: 77 BPM | WEIGHT: 227.96 LBS | TEMPERATURE: 98 F | HEIGHT: 67 IN | SYSTOLIC BLOOD PRESSURE: 172 MMHG | DIASTOLIC BLOOD PRESSURE: 101 MMHG | OXYGEN SATURATION: 95 %

## 2022-02-21 DIAGNOSIS — Z95.828 PRESENCE OF OTHER VASCULAR IMPLANTS AND GRAFTS: Chronic | ICD-10-CM

## 2022-02-21 DIAGNOSIS — Z98.890 OTHER SPECIFIED POSTPROCEDURAL STATES: Chronic | ICD-10-CM

## 2022-02-21 DIAGNOSIS — Z95.1 PRESENCE OF AORTOCORONARY BYPASS GRAFT: Chronic | ICD-10-CM

## 2022-02-21 LAB
ALBUMIN SERPL ELPH-MCNC: 3.7 G/DL — SIGNIFICANT CHANGE UP (ref 3.5–5.2)
ALP SERPL-CCNC: 178 U/L — HIGH (ref 30–115)
ALT FLD-CCNC: 31 U/L — SIGNIFICANT CHANGE UP (ref 0–41)
ANION GAP SERPL CALC-SCNC: 15 MMOL/L — HIGH (ref 7–14)
AST SERPL-CCNC: 27 U/L — SIGNIFICANT CHANGE UP (ref 0–41)
BASE EXCESS BLDV CALC-SCNC: -0.8 MMOL/L — SIGNIFICANT CHANGE UP (ref -2–3)
BASOPHILS # BLD AUTO: 0.09 K/UL — SIGNIFICANT CHANGE UP (ref 0–0.2)
BASOPHILS NFR BLD AUTO: 0.8 % — SIGNIFICANT CHANGE UP (ref 0–1)
BILIRUB SERPL-MCNC: 0.3 MG/DL — SIGNIFICANT CHANGE UP (ref 0.2–1.2)
BUN SERPL-MCNC: 61 MG/DL — CRITICAL HIGH (ref 10–20)
CA-I SERPL-SCNC: 0.98 MMOL/L — LOW (ref 1.15–1.33)
CALCIUM SERPL-MCNC: 7.5 MG/DL — LOW (ref 8.5–10.1)
CHLORIDE SERPL-SCNC: 102 MMOL/L — SIGNIFICANT CHANGE UP (ref 98–110)
CO2 SERPL-SCNC: 24 MMOL/L — SIGNIFICANT CHANGE UP (ref 17–32)
CREAT SERPL-MCNC: 6.3 MG/DL — CRITICAL HIGH (ref 0.7–1.5)
EOSINOPHIL # BLD AUTO: 0.19 K/UL — SIGNIFICANT CHANGE UP (ref 0–0.7)
EOSINOPHIL NFR BLD AUTO: 1.8 % — SIGNIFICANT CHANGE UP (ref 0–8)
GAS PNL BLDV: 136 MMOL/L — SIGNIFICANT CHANGE UP (ref 136–145)
GAS PNL BLDV: SIGNIFICANT CHANGE UP
GLUCOSE BLDC GLUCOMTR-MCNC: 179 MG/DL — HIGH (ref 70–99)
GLUCOSE BLDC GLUCOMTR-MCNC: 277 MG/DL — HIGH (ref 70–99)
GLUCOSE SERPL-MCNC: 174 MG/DL — HIGH (ref 70–99)
HCO3 BLDV-SCNC: 24 MMOL/L — SIGNIFICANT CHANGE UP (ref 22–29)
HCT VFR BLD CALC: 30.9 % — LOW (ref 42–52)
HCT VFR BLDA CALC: 31 % — LOW (ref 39–51)
HGB BLD CALC-MCNC: 10.3 G/DL — LOW (ref 12.6–17.4)
HGB BLD-MCNC: 9.9 G/DL — LOW (ref 14–18)
IMM GRANULOCYTES NFR BLD AUTO: 0.5 % — HIGH (ref 0.1–0.3)
LACTATE BLDV-MCNC: 1 MMOL/L — SIGNIFICANT CHANGE UP (ref 0.5–2)
LYMPHOCYTES # BLD AUTO: 1.11 K/UL — LOW (ref 1.2–3.4)
LYMPHOCYTES # BLD AUTO: 10.3 % — LOW (ref 20.5–51.1)
MCHC RBC-ENTMCNC: 29.8 PG — SIGNIFICANT CHANGE UP (ref 27–31)
MCHC RBC-ENTMCNC: 32 G/DL — SIGNIFICANT CHANGE UP (ref 32–37)
MCV RBC AUTO: 93.1 FL — SIGNIFICANT CHANGE UP (ref 80–94)
MONOCYTES # BLD AUTO: 0.65 K/UL — HIGH (ref 0.1–0.6)
MONOCYTES NFR BLD AUTO: 6 % — SIGNIFICANT CHANGE UP (ref 1.7–9.3)
NEUTROPHILS # BLD AUTO: 8.72 K/UL — HIGH (ref 1.4–6.5)
NEUTROPHILS NFR BLD AUTO: 80.6 % — HIGH (ref 42.2–75.2)
NRBC # BLD: 0 /100 WBCS — SIGNIFICANT CHANGE UP (ref 0–0)
NT-PROBNP SERPL-SCNC: HIGH PG/ML (ref 0–300)
PCO2 BLDV: 41 MMHG — LOW (ref 42–55)
PH BLDV: 7.38 — SIGNIFICANT CHANGE UP (ref 7.32–7.43)
PLATELET # BLD AUTO: 205 K/UL — SIGNIFICANT CHANGE UP (ref 130–400)
PO2 BLDV: 54 MMHG — SIGNIFICANT CHANGE UP
POTASSIUM BLDV-SCNC: 4.4 MMOL/L — SIGNIFICANT CHANGE UP (ref 3.5–5.1)
POTASSIUM SERPL-MCNC: 4.6 MMOL/L — SIGNIFICANT CHANGE UP (ref 3.5–5)
POTASSIUM SERPL-SCNC: 4.6 MMOL/L — SIGNIFICANT CHANGE UP (ref 3.5–5)
PROT SERPL-MCNC: 6.7 G/DL — SIGNIFICANT CHANGE UP (ref 6–8)
RBC # BLD: 3.32 M/UL — LOW (ref 4.7–6.1)
RBC # FLD: 14.9 % — HIGH (ref 11.5–14.5)
SAO2 % BLDV: 81.5 % — SIGNIFICANT CHANGE UP
SARS-COV-2 RNA SPEC QL NAA+PROBE: SIGNIFICANT CHANGE UP
SODIUM SERPL-SCNC: 141 MMOL/L — SIGNIFICANT CHANGE UP (ref 135–146)
TROPONIN T SERPL-MCNC: 0.19 NG/ML — CRITICAL HIGH
WBC # BLD: 10.81 K/UL — HIGH (ref 4.8–10.8)
WBC # FLD AUTO: 10.81 K/UL — HIGH (ref 4.8–10.8)

## 2022-02-21 PROCEDURE — 71045 X-RAY EXAM CHEST 1 VIEW: CPT | Mod: 26

## 2022-02-21 PROCEDURE — 93010 ELECTROCARDIOGRAM REPORT: CPT

## 2022-02-21 PROCEDURE — 99285 EMERGENCY DEPT VISIT HI MDM: CPT

## 2022-02-21 PROCEDURE — 99223 1ST HOSP IP/OBS HIGH 75: CPT

## 2022-02-21 RX ORDER — CLOPIDOGREL BISULFATE 75 MG/1
75 TABLET, FILM COATED ORAL DAILY
Refills: 0 | Status: DISCONTINUED | OUTPATIENT
Start: 2022-02-21 | End: 2022-02-28

## 2022-02-21 RX ORDER — ASPIRIN/CALCIUM CARB/MAGNESIUM 324 MG
81 TABLET ORAL DAILY
Refills: 0 | Status: DISCONTINUED | OUTPATIENT
Start: 2022-02-21 | End: 2022-02-28

## 2022-02-21 RX ORDER — HEPARIN SODIUM 5000 [USP'U]/ML
5000 INJECTION INTRAVENOUS; SUBCUTANEOUS EVERY 12 HOURS
Refills: 0 | Status: DISCONTINUED | OUTPATIENT
Start: 2022-02-21 | End: 2022-02-28

## 2022-02-21 RX ORDER — BUMETANIDE 0.25 MG/ML
2 INJECTION INTRAMUSCULAR; INTRAVENOUS
Refills: 0 | Status: DISCONTINUED | OUTPATIENT
Start: 2022-02-21 | End: 2022-02-28

## 2022-02-21 RX ORDER — ATORVASTATIN CALCIUM 80 MG/1
40 TABLET, FILM COATED ORAL AT BEDTIME
Refills: 0 | Status: DISCONTINUED | OUTPATIENT
Start: 2022-02-21 | End: 2022-02-28

## 2022-02-21 RX ORDER — NIFEDIPINE 30 MG
30 TABLET, EXTENDED RELEASE 24 HR ORAL DAILY
Refills: 0 | Status: DISCONTINUED | OUTPATIENT
Start: 2022-02-21 | End: 2022-02-22

## 2022-02-21 RX ORDER — CHLORHEXIDINE GLUCONATE 213 G/1000ML
1 SOLUTION TOPICAL
Refills: 0 | Status: DISCONTINUED | OUTPATIENT
Start: 2022-02-21 | End: 2022-02-28

## 2022-02-21 RX ORDER — METOPROLOL TARTRATE 50 MG
50 TABLET ORAL
Refills: 0 | Status: DISCONTINUED | OUTPATIENT
Start: 2022-02-21 | End: 2022-02-28

## 2022-02-21 RX ORDER — SEVELAMER CARBONATE 2400 MG/1
800 POWDER, FOR SUSPENSION ORAL
Refills: 0 | Status: DISCONTINUED | OUTPATIENT
Start: 2022-02-21 | End: 2022-02-28

## 2022-02-21 RX ADMIN — SEVELAMER CARBONATE 800 MILLIGRAM(S): 2400 POWDER, FOR SUSPENSION ORAL at 18:50

## 2022-02-21 RX ADMIN — HEPARIN SODIUM 5000 UNIT(S): 5000 INJECTION INTRAVENOUS; SUBCUTANEOUS at 17:59

## 2022-02-21 RX ADMIN — Medication 30 MILLIGRAM(S): at 21:36

## 2022-02-21 RX ADMIN — BUMETANIDE 2 MILLIGRAM(S): 0.25 INJECTION INTRAMUSCULAR; INTRAVENOUS at 21:35

## 2022-02-21 RX ADMIN — ATORVASTATIN CALCIUM 40 MILLIGRAM(S): 80 TABLET, FILM COATED ORAL at 21:36

## 2022-02-21 RX ADMIN — Medication 50 MILLIGRAM(S): at 17:59

## 2022-02-21 NOTE — ED ADULT NURSE NOTE - NSFALLRSKPASTHIST_ED_ALL_ED
Patient was seen in the clinic today and Dr Sebastian ordered Bilateral sacroiliac joint injection. Dr Sebastian states patient will call back to schedule.  
no

## 2022-02-21 NOTE — PATIENT PROFILE ADULT - BRAND OF COVID-19 VACCINATION
"Follow up call: Patient states she was discharged this past Sunday 12/1/19 and went to the pediatrician for follow up on Tuesday 12/3/19. Baby weighed 8-5 (10% weight loss). The pediatrician referred her to Dr Shafer, an oral surgeon, to clip frenulum. She went directly to his office and had the tongue tie clipped and she breastfeed the baby after the procedure in the office. States the baby latched and nursed well with frequent swallows, breast softer nursing, and milk drooling out of his mouth and content. Pediatrician had instructed mother to come back Thursday for a weight check. For the next 1-2 days she nursed the baby every 1-2 hours for 15-20 min on each breast, pumped and "syringe fed" 10-15 ml after each nursing. In the past 24 hours the baby has had a wet diaper at almost each feeding and 5-6 yellow seedy loose stools (2-3 were medium-large in size). Baby went back for a weight check today and weighed 8-12. The pediatrician told the mother to stop supplementing and just breastfeed. She was instructed to go back to the pediatrician in 3 weeks for follow up. Patient stated she is still pumping after nursing x 10 min for storage of milk because she has to go back to work. She obtains about 10-20 mls after nursing. Discussed signs of milk transfer and expected number of wet and soiled diapers in a 24 hour period. Reinforced baby should have minimum of 3-4 medium to large yellow seedy stools in 24 hours, breasts softer, baby content after nursing etc. Also discussed post procedure follow up for possible stretching exercises of the tongue where it was clipped and referred to list of SLPs given to her at discharge from the hospital. Also recommended she have the baby weighed early next week to check the baby's progress by nursing without supplementation of EBM after nursing. Also discussed possible OPC for pre/post feeding weight next week for amount of milk transfer. Mother stated she would follow up with " Lactation and/or pediatrician next week or sooner if needed and record feedings and output. Lactation support line number given.    Moderna dose 1 and 2

## 2022-02-21 NOTE — PATIENT PROFILE ADULT - FUNCTIONAL ASSESSMENT - DAILY ACTIVITY 4.
Impression: Other secondary cataract, bilateral: H26.493. Plan: Discussed diagnosis in detail with patient. Discussed treatment options with patient. No treatment is required at this time. Will continue to observe condition and or symptoms. 3 = A little assistance

## 2022-02-21 NOTE — H&P ADULT - NSHPREVIEWOFSYSTEMS_GEN_ALL_CORE
CONSTITUTIONAL: No weakness, fevers or chills, no weight loss   EYES/ENT: No visual changes;  No vertigo or throat pain   NECK: No pain or stiffness  RESPIRATORY: No cough, wheezing, hemoptysis; No shortness of breath  CARDIOVASCULAR: No chest pain or palpitations  GASTROINTESTINAL: No abdominal or epigastric pain. No nausea, vomiting, or hematemesis; No diarrhea or constipation. No melena or hematochezia.  GENITOURINARY: No dysuria, frequency or hematuria  NEUROLOGICAL: No numbness or weakness  All other review of systems is negative unless indicated above. CONSTITUTIONAL: + weakness. No fevers or chills  EYES/ENT: No visual changes;  No vertigo or throat pain   NECK: No pain or stiffness  RESPIRATORY: No cough, wheezing, hemoptysis; No shortness of breath at this time  CARDIOVASCULAR: No chest pain or palpitations.   GASTROINTESTINAL: No abdominal or epigastric pain. No nausea, vomiting, or hematemesis; No diarrhea or constipation. No melena or hematochezia.  GENITOURINARY: No dysuria, frequency or hematuria  NEUROLOGICAL: No numbness or weakness  All other review of systems is negative unless indicated above.

## 2022-02-21 NOTE — H&P ADULT - NSHPPHYSICALEXAM_GEN_ALL_CORE
GENERAL: NAD, well-developed, AAOx3  HEENT:  Atraumatic, Normocephalic. EOMI, PERRLA, conjunctiva and sclera clear, No JVD  PULMONARY: Clear to auscultation bilaterally; No wheeze  CARDIOVASCULAR: Regular rate and rhythm; No murmurs, rubs, or gallops  GASTROINTESTINAL: Soft, Nontender, Nondistended; Bowel sounds present  MUSCULOSKELETAL:  2+ Peripheral Pulses, No clubbing, cyanosis, or edema  NEUROLOGY: non-focal  SKIN: No rashes or lesions GENERAL: NAD, well-developed, AAOx3, obese  HEENT:  Atraumatic, Normocephalic. EOMI, conjunctiva and sclera clear, No JVD  PULMONARY: Clear to auscultation bilaterally; No wheeze noted  CARDIOVASCULAR: Regular rate and rhythm; No murmurs, rubs, or gallops  GASTROINTESTINAL: Soft, Nontender, distended?; Bowel sounds present  MUSCULOSKELETAL: BL LE edema, dry skin  NEUROLOGY: non-focal  SKIN: No rashes or lesions

## 2022-02-21 NOTE — ED PROVIDER NOTE - INTERNATIONAL TRAVEL
No
[FreeTextEntry1] : Dear Dr. Salmon:\par \par I had the pleasure of re-evaluating your patient, Ms. Aarti Gaona, who presented today via Telemedicine for follow-up cardiovascular and pre-operative cardiovascular evaluation.  I had seen her last in November 2020 prior to Left VATS and Lung Resection performed in December 2020.  She is now preparing to undergo thoracic surgery again for a suspicious RUL nodule.  She is planning to undergo surgical Flex Bronch, RT VATS, Lung resection for more definitive diagnosis. \par \par As you know, she is a 70 year old woman without active cardiac complaints.  She denies having chest pain, dyspnea, palpitations, orthopnea/PND, edema.  She has not had any allergic reactions to anesthesia.  Denies prior history of CAD, CVA, CHF, arrhythmias, VTE/ATE.\par \par Prior 12-lead ECG was NSR, normal axis, normal intervals. VSS.  Prior physical examination was unremarkable.  She appears euvolemic on exam.\par \par Echocardiogram performed in November 2020 demonstrated normal biventricular systolic function.\par \par From the cardiac perspective, Ms. Gaona may proceed with thoracic surgery without the need for additional cardiac testing or risk stratification.\par \par oNrth, thank you for entrusting her care with me.\par \par Warmest regards,\par Trevon Ochoa\par

## 2022-02-21 NOTE — ED ADULT TRIAGE NOTE - CHIEF COMPLAINT QUOTE
as  per ems pt went to dialysis and he looked too pale so they didn't do dialysis. pt has no complaints

## 2022-02-21 NOTE — ED PROVIDER NOTE - CLINICAL SUMMARY MEDICAL DECISION MAKING FREE TEXT BOX
61-year-old male, past medical history of hyperlipidemia, diabetes, CAD status post CABG and stent on Plavix, ESRD HD on Monday Wednesday Friday Presents from hemodialysis after they told him to come to the ER because he looked pale.  Patient denies any bleeding, or melena.  States that he has been short of breath, for about 1 month, worse with exertion, which is worse than the baseline shortness of breath he gets prior to dialysis.  Usually he feels fine after dialysis, but the next day he is starting to get more short of breath again, which is a new finding over the last few weeks.  No chest pain, cough or fever.  No worsening lower extremity swelling.  On exam nontoxic, vital signs stable, slightly pale appearing, heart regular, no murmurs, lungs clear bilaterally, abdomen benign, bilateral 1+ lower extremity pitting edema, slightly more on the left.  EKG with nonspecific lateral T wave abnormalities, no ST depression or ST elevation.  Labs with signs of renal failure, troponin elevated but has been in the past.  Given significant cardiac disease, and worsening dyspnea on exertion, admitted to medicine for further evaluation.  We will also get dialyzed in the hospital

## 2022-02-21 NOTE — ED PROVIDER NOTE - HIV OFFER
Previously Declined (within the last year) Clear bilaterally, pupils equal, round and reactive to light.

## 2022-02-21 NOTE — CONSULT NOTE ADULT - SUBJECTIVE AND OBJECTIVE BOX
NEPHROLOGY CONSULTATION NOTE    SCHWABACHER, LAWRENCE  61y  Male  MRN-[id number]    CC:   Patient is a 61y old  Male who presents with a chief complaint of     HPI:  60 yo M with PMHx of CAD, s/p staged PCI CABG 2012, ESRD on HD M/W/F, Normocytic Anemia of Chronic Disease, DM Type II, Hypertension, DLD, TIA, PVD s/p left below knee popliteal to AT artery reverse saphenous vein graft in April 2017 and balloon plasty of his left lower extremity bypass graft in June 2020, and carotid stenosis s/p right carotid endarterectomy in June 2017 sent in from o/p dialysis (prior to starting HD) because he appeared pale. Pt does endorse some lethargy when he arrived at dialysis center today. He also reports some intermittent SOB with non-productive cough over the past month which is worse with exertion or when laying down. Denies any CP or palpitations today or over the past month. He was prescribed 80mg lasix PO BID but does not seem like he was taking this dose. His diet is somewhat controlled but still not ideal and he drinks lots of coffee/tea throughout the day.    In the ED: /101, vitals otherwise wnl  Trop 0.19, lower than previous  BNP >70K (21 Feb 2022 12:34)      PAST MEDICAL & SURGICAL HISTORY:  CAD (coronary artery disease)    S/P CABG (coronary artery bypass graft)  x4    Diabetes mellitus    Transient ischemic attack (TIA)  2017; 2008    Chronic kidney disease (CKD)  Stage IV    Hypertension    Stented coronary artery  in 2008    Myocardial infarction  2012    PAD (peripheral artery disease)  S/p bypass left leg    HLD (hyperlipidemia)    BPH (benign prostatic hyperplasia)    Pain in left knee  s/p fall    OA (osteoarthritis)    [place] (hard of hearing)    Chronic anemia    S/P CABG (coronary artery bypass graft)  2012    H/O arterial bypass of lower limb  Left Lower Extremity (2016)    History of surgery  Left CEA (2017)  Left Pinkie toe Amputation (2014)  CABG x 4 (2012)  Card cath - stent (2008)        Allergies:  No Known Allergies    Home Medications Reviewed  Hospital Medications:   MEDICATIONS  (STANDING):  aspirin enteric coated 81 milliGRAM(s) Oral daily  atorvastatin 40 milliGRAM(s) Oral at bedtime  buMETAnide 2 milliGRAM(s) Oral <User Schedule>  buMETAnide 2 milliGRAM(s) Oral <User Schedule>  chlorhexidine 4% Liquid 1 Application(s) Topical <User Schedule>  clopidogrel Tablet 75 milliGRAM(s) Oral daily  heparin   Injectable 5000 Unit(s) SubCutaneous every 12 hours  metoprolol tartrate 50 milliGRAM(s) Oral two times a day  sevelamer carbonate 800 milliGRAM(s) Oral three times a day with meals    Home Medications:  aspirin 81 mg oral tablet: 1 tab(s) orally once a day (21 Feb 2022 13:33)  furosemide 80 mg oral tablet: 1 tab(s) orally 2 times a day (21 Feb 2022 13:33)  Levemir 100 units/mL subcutaneous solution: 40 unit(s) subcutaneous once a day (at bedtime).  (21 Feb 2022 13:33)  Metoprolol Tartrate 50 mg oral tablet: 1 tab(s) orally 2 times a day (21 Feb 2022 13:33)  Plavix 75 mg oral tablet: 1 tab(s) orally once a day (21 Feb 2022 13:33)  Trulicity Pen: 1 milligram(s) subcutaneous once a week (21 Feb 2022 13:33)      SOCIAL HISTORY:  Social History:  Lives at home alone.  Denies smoking, alcohol, or illicit drug use. (21 Feb 2022 12:34)      FAMILY HISTORY:  Family history of heart disease (Father)    DM (diabetes mellitus) (Mother)    ESRD (end stage renal disease) on dialysis (Mother)        REVIEW OF SYSTEMS:   All other review of systems is negative unless indicated above.    VITALS:  T(F): 97.7 (02-21-22 @ 07:16), Max: 97.7 (02-21-22 @ 07:16)  HR: 72 (02-21-22 @ 13:45)  BP: 187/91 (02-21-22 @ 13:45)  RR: 17 (02-21-22 @ 13:45)  SpO2: 95% (02-21-22 @ 07:16)    Height (cm): 170.2 (02-21 @ 07:16)  Weight (kg): 103.4 (02-21 @ 07:16)  BMI (kg/m2): 35.7 (02-21 @ 07:16)  BSA (m2): 2.14 (02-21 @ 07:16)  I&O's Detail        I&O's Summary      PHYSICAL EXAM:  Gen: NAD  resp: decreased bs at the bases  card: S1/S2  abd: soft  ext: 3+b/l LE pitting edema  vascular access: av    LABS:  Daily Height in cm: 170.18 (21 Feb 2022 07:16)        Blood Gas Profile - Venous (02.21.22 @ 07:26)    pH, Venous: 7.38    pCO2, Venous: 41 mmHg      02-21    141  |  102  |  61<HH>  ----------------------------<  174<H>  4.6   |  24  |  6.3<HH>    Ca    7.5<L>      21 Feb 2022 08:11    TPro  6.7  /  Alb  3.7  /  TBili  0.3  /  DBili      /  AST  27  /  ALT  31  /  AlkPhos  178<H>  02-21      Creatinine Trend:   Creatinine, Serum: 6.3 mg/dL (02-21-22 @ 08:11)  Creatinine, Serum: 6.1 mg/dL (08-14-21 @ 06:06)  Creatinine, Serum: 5.6 mg/dL (08-13-21 @ 22:03)  Creatinine, Serum: 7.8 mg/dL (05-17-21 @ 06:27)  Creatinine, Serum: 6.7 mg/dL (05-16-21 @ 09:34)  Creatinine, Serum: 5.5 mg/dL (05-15-21 @ 12:20)                          9.9    10.81 )-----------( 205      ( 21 Feb 2022 08:11 )             30.9     Mean Cell Volume: 93.1 fL (02-21-22 @ 08:11)          RADIOLOGY & ADDITIONAL STUDIES:        Xray Chest 2 Views PA/Lat:   EXAM:  XR CHEST PA LAT 2V            PROCEDURE DATE:  08/30/2021            INTERPRETATION:  Clinical History / Reason for exam: Cough    Comparison : Chest radiograph 8/13/2021.    Technique/Positioning: PA and lateral chest radiographs.    Findings:    Support devices: None.    Cardiac/mediastinum/hilum: Median sternotomy and surgical clips, status post CABG..    Lung parenchyma/Pleura: No focal consolidation, effusion or pneumothorax..    Skeleton/soft tissues: Stable.    Impression:    No radiographic evidence of acute cardiopulmonary disease.        --- End of Report ---          AMI GOKLI MD; Attending Radiologist  This document has been electronically signed. Aug 30 2021 11:28AM (08-30-21 @ 10:39)                     NEPHROLOGY CONSULTATION NOTE    SCHWABACHER, LAWRENCE  61y  Male  MRN-[id number]    CC:   Patient is a 61y old  Male who presents with a chief complaint of     HPI:  60 yo M with PMHx of CAD, s/p staged PCI CABG 2012, ESRD on HD M/W/F, Normocytic Anemia of Chronic Disease, DM Type II, Hypertension, DLD, TIA, PVD s/p left below knee popliteal to AT artery reverse saphenous vein graft in April 2017 and balloon plasty of his left lower extremity bypass graft in June 2020, and carotid stenosis s/p right carotid endarterectomy in June 2017 sent in from o/p dialysis (prior to starting HD) because he appeared pale. Pt does endorse some lethargy when he arrived at dialysis center today. He also reports some intermittent SOB with non-productive cough over the past month which is worse with exertion or when laying down. Denies any CP or palpitations today or over the past month. He was prescribed 80mg lasix PO BID but does not seem like he was taking this dose. His diet is somewhat controlled but still not ideal and he drinks lots of coffee/tea throughout the day.    In the ED: /101, vitals otherwise wnl  Trop 0.19, lower than previous  BNP >70K (21 Feb 2022 12:34)      PAST MEDICAL & SURGICAL HISTORY:  CAD (coronary artery disease)    S/P CABG (coronary artery bypass graft)  x4    Diabetes mellitus    Transient ischemic attack (TIA)  2017; 2008    Chronic kidney disease (CKD)  Stage IV    Hypertension    Stented coronary artery  in 2008    Myocardial infarction  2012    PAD (peripheral artery disease)  S/p bypass left leg    HLD (hyperlipidemia)    BPH (benign prostatic hyperplasia)    Pain in left knee  s/p fall    OA (osteoarthritis)    [place] (hard of hearing)    Chronic anemia    S/P CABG (coronary artery bypass graft)  2012    H/O arterial bypass of lower limb  Left Lower Extremity (2016)    History of surgery  Left CEA (2017)  Left Pinkie toe Amputation (2014)  CABG x 4 (2012)  Card cath - stent (2008)        Allergies:  No Known Allergies    Home Medications Reviewed  Hospital Medications:   MEDICATIONS  (STANDING):  aspirin enteric coated 81 milliGRAM(s) Oral daily  atorvastatin 40 milliGRAM(s) Oral at bedtime  buMETAnide 2 milliGRAM(s) Oral <User Schedule>  buMETAnide 2 milliGRAM(s) Oral <User Schedule>  chlorhexidine 4% Liquid 1 Application(s) Topical <User Schedule>  clopidogrel Tablet 75 milliGRAM(s) Oral daily  heparin   Injectable 5000 Unit(s) SubCutaneous every 12 hours  metoprolol tartrate 50 milliGRAM(s) Oral two times a day  sevelamer carbonate 800 milliGRAM(s) Oral three times a day with meals    Home Medications:  aspirin 81 mg oral tablet: 1 tab(s) orally once a day (21 Feb 2022 13:33)  furosemide 80 mg oral tablet: 1 tab(s) orally 2 times a day (21 Feb 2022 13:33)  Levemir 100 units/mL subcutaneous solution: 40 unit(s) subcutaneous once a day (at bedtime).  (21 Feb 2022 13:33)  Metoprolol Tartrate 50 mg oral tablet: 1 tab(s) orally 2 times a day (21 Feb 2022 13:33)  Plavix 75 mg oral tablet: 1 tab(s) orally once a day (21 Feb 2022 13:33)  Trulicity Pen: 1 milligram(s) subcutaneous once a week (21 Feb 2022 13:33)      SOCIAL HISTORY:  Social History:  Lives at home alone.  Denies smoking, alcohol, or illicit drug use. (21 Feb 2022 12:34)      FAMILY HISTORY:  Family history of heart disease (Father)    DM (diabetes mellitus) (Mother)    ESRD (end stage renal disease) on dialysis (Mother)        REVIEW OF SYSTEMS:  +SOB on exertion   +orthopnea  Denies chest pain    All other review of systems is negative unless indicated above.    VITALS:  T(F): 97.7 (02-21-22 @ 07:16), Max: 97.7 (02-21-22 @ 07:16)  HR: 72 (02-21-22 @ 13:45)  BP: 187/91 (02-21-22 @ 13:45)  RR: 17 (02-21-22 @ 13:45)  SpO2: 95% (02-21-22 @ 07:16)    Height (cm): 170.2 (02-21 @ 07:16)  Weight (kg): 103.4 (02-21 @ 07:16)  BMI (kg/m2): 35.7 (02-21 @ 07:16)  BSA (m2): 2.14 (02-21 @ 07:16)  I&O's Detail        I&O's Summary      PHYSICAL EXAM:  Gen: NAD  resp: decreased bs at the bases  card: S1/S2  abd: soft  ext: 3+b/l LE pitting edema  vascular access: av    LABS:  Daily Height in cm: 170.18 (21 Feb 2022 07:16)        Blood Gas Profile - Venous (02.21.22 @ 07:26)    pH, Venous: 7.38    pCO2, Venous: 41 mmHg      02-21    141  |  102  |  61<HH>  ----------------------------<  174<H>  4.6   |  24  |  6.3<HH>    Ca    7.5<L>      21 Feb 2022 08:11    TPro  6.7  /  Alb  3.7  /  TBili  0.3  /  DBili      /  AST  27  /  ALT  31  /  AlkPhos  178<H>  02-21      Creatinine Trend:   Creatinine, Serum: 6.3 mg/dL (02-21-22 @ 08:11)  Creatinine, Serum: 6.1 mg/dL (08-14-21 @ 06:06)  Creatinine, Serum: 5.6 mg/dL (08-13-21 @ 22:03)  Creatinine, Serum: 7.8 mg/dL (05-17-21 @ 06:27)  Creatinine, Serum: 6.7 mg/dL (05-16-21 @ 09:34)  Creatinine, Serum: 5.5 mg/dL (05-15-21 @ 12:20)                          9.9    10.81 )-----------( 205      ( 21 Feb 2022 08:11 )             30.9     Mean Cell Volume: 93.1 fL (02-21-22 @ 08:11)          RADIOLOGY & ADDITIONAL STUDIES:        Xray Chest 2 Views PA/Lat:   EXAM:  XR CHEST PA LAT 2V            PROCEDURE DATE:  08/30/2021            INTERPRETATION:  Clinical History / Reason for exam: Cough    Comparison : Chest radiograph 8/13/2021.    Technique/Positioning: PA and lateral chest radiographs.    Findings:    Support devices: None.    Cardiac/mediastinum/hilum: Median sternotomy and surgical clips, status post CABG..    Lung parenchyma/Pleura: No focal consolidation, effusion or pneumothorax..    Skeleton/soft tissues: Stable.    Impression:    No radiographic evidence of acute cardiopulmonary disease.        --- End of Report ---          MAO BOYER MD; Attending Radiologist  This document has been electronically signed. Aug 30 2021 11:28AM (08-30-21 @ 10:39)

## 2022-02-21 NOTE — H&P ADULT - NSICDXPASTMEDICALHX_GEN_ALL_CORE_FT
PAST MEDICAL HISTORY:  BPH (benign prostatic hyperplasia)     CAD (coronary artery disease)     Chronic anemia     Chronic kidney disease (CKD) Stage IV    Diabetes mellitus     HLD (hyperlipidemia)     [place] (hard of hearing)     Hypertension     Myocardial infarction 2012    OA (osteoarthritis)     PAD (peripheral artery disease) S/p bypass left leg    Pain in left knee s/p fall    S/P CABG (coronary artery bypass graft) x4    Stented coronary artery in 2008    Transient ischemic attack (TIA) 2017; 2008

## 2022-02-21 NOTE — CONSULT NOTE ADULT - ASSESSMENT
# ESRD on HD   # Hypervolemia  # HTN  # Normocytic anemia / CKD  # MBD      Recommendations:  - HD today, 4L UF as tolerated  - pt is non-oliguric, change lasix to bumex 2mg po bid on non HD days, qpm on HD days  - f/u TTE   - r/o ACS, trend trop/serial EKG  - strict i/o / bladder scan to r/o urinary retention  - check phos level  - monitor BP post HD, may need to increase anti-hypertensives # ESRD on HD   # Hypervolemia  # HTN  # Normocytic anemia / CKD  # MBD      Recommendations:  - HD today, 4L UF as tolerated  - pt is non-oliguric, change lasix to bumex 2mg po bid on non HD days, qpm on HD days  - r/o ACS, trend trop/serial EKG  - f/u TTE   - strict i/o / bladder scan to r/o urinary retention  - check phos level, on sevelamer  - monitor BP post HD, may need to increase anti-hypertensives

## 2022-02-21 NOTE — ED ADULT NURSE NOTE - NSICDXPASTMEDICALHX_GEN_ALL_CORE_FT
PAST MEDICAL HISTORY:  BPH (benign prostatic hyperplasia)     CAD (coronary artery disease)     Chronic anemia     Chronic kidney disease (CKD) Stage IV    Diabetes mellitus     HLD (hyperlipidemia)     [place] (hard of hearing)     Hypertension     Myocardial infarction 2012    OA (osteoarthritis)     PAD (peripheral artery disease) S/p bypass left leg    Pain in left knee s/p fall    S/P CABG (coronary artery bypass graft) x4    Stented coronary artery in 2008    Transient ischemic attack (TIA) 2017; 2008

## 2022-02-21 NOTE — H&P ADULT - ASSESSMENT
60 yo M with PMHx of ESRD on HD M/W/F, Normocytic Anemia of Chronic Disease, DM Type II, Hypertension, DLD, TIA, CAD s/p staged PCI CABG 2012, PVD s/p left below knee popliteal to AT artery reverse saphenous vein graft in 4/2017 and balloon plasty of his left lower extremity bypass graft in 6/2020, and carotid stenosis s/p right carotid endarterectomy in 6/2017 sent in from o/p dialysis (prior to starting HD) because he appeared pale.    # ESRD on HD MWF  # Fluid Overload; suspected due to diet/fluid intake+ incorrect use of home lasix; r/o cardiac cause  - due for HD today, did not miss any sessions as o/p; currently undergoing HD  - diet and fluid intake not ideal and seems meds were not being taken properly  - switched from lasix to PO bumex as per nephro recs; f/u official note  - c/w sevelamer TID  - f/u AM BMP, Mg, phos  - BNP >70K  - TTE 5/2021 with EF 50-55% and grade 1 diastolic dysfunction; f/u repeat echo    # Anemia chronic disease  - Hb 9.9, mildly decreased from baseline  - f/u AM repeat    # DM  - takes lantus 40 units at night and trulicity weekly  - monitor FS and start insulin regimen if consistently >180    # HTN  - initially elevated in ED  - only on metoprolol 50mg BID at home  - monitor BP after HD    # DLD  - c/w statin    # CAD s/p staged PCI CABG 2012  # PVD s/p left below knee popliteal to AT artery reverse saphenous vein graft in 4/2017 and balloon plasty of his left lower extremity bypass graft in 6/2020 # Carotid stenosis s/p right carotid endarterectomy in 6/ 2017  - c/w asa 81mg, plavix, and BB      DVT ppx: heparin SC  GI ppx: NA  Diet: DASH, CC, renal, fluid restricted  Code Status: Full Code

## 2022-02-21 NOTE — H&P ADULT - HISTORY OF PRESENT ILLNESS
62 yo M with PMHx of CAD, s/p staged PCI CABG 2012, ESRD on HD M/W/F, Normocytic Anemia of Chronic Disease, DM Type II, Hypertension, DLD, TIA, PVD s/p left below knee popliteal to AT artery reverse saphenous vein graft in April 2017 and balloon plasty of his left lower extremity bypass graft in June 2020, and carotid stenosis s/p right carotid endarterectomy in June 2017 sent in from o/p dialysis (prior to starting HD) because he appeared pale. Pt does endorse some lethargy when he arrived at dialysis center today. He also reports some intermittent SOB with non-productive cough over the past month which is worse with exertion or when laying down. Denies any CP or palpitations today or over the past month.  62 yo M with PMHx of CAD, s/p staged PCI CABG 2012, ESRD on HD M/W/F, Normocytic Anemia of Chronic Disease, DM Type II, Hypertension, DLD, TIA, PVD s/p left below knee popliteal to AT artery reverse saphenous vein graft in April 2017 and balloon plasty of his left lower extremity bypass graft in June 2020, and carotid stenosis s/p right carotid endarterectomy in June 2017 sent in from o/p dialysis (prior to starting HD) because he appeared pale. Pt does endorse some lethargy when he arrived at dialysis center today. He also reports some intermittent SOB with non-productive cough over the past month which is worse with exertion or when laying down. Denies any CP or palpitations today or over the past month. He was prescribed 80mg lasix PO BID but does not seem like he was taking this dose. His diet is somewhat controlled but still not ideal and he drinks lots of coffee/tea throughout the day.    In the ED: /101, vitals otherwise wnl  Trop 0.19, lower than previous  BNP >70K

## 2022-02-21 NOTE — PATIENT PROFILE ADULT - FALL HARM RISK - RISK INTERVENTIONS

## 2022-02-21 NOTE — ED PROVIDER NOTE - OBJECTIVE STATEMENT
61-year-old male, past medical history of hyperlipidemia, diabetes, CAD status post CABG and stent on Plavix, ESRD HD on Monday Wednesday Friday, presents ED for evaluation.  Patient was at dialysis was sent in because appearance was pale.  Patient without any specific pain or radiation, patient without any specific complaints.  Patient's nephrologist is Dr. Luke Boss.  Denies fever, chills, chest pain, shortness of breath, abdominal pain, nausea vomiting diarrhea, dark or bloody stools, blood transfusions. Per EMS vital signs stable 61-year-old male, past medical history of hyperlipidemia, diabetes, CAD status post CABG and stent on Plavix, ESRD HD on Monday Wednesday Friday, presents ED for evaluation.  Patient was at dialysis was sent in because appearance was pale.  Patient without any specific pain or radiation, Pt c/o sob, mild, tightness .  Patient's nephrologist is Dr. Luke Boss.  Denies fever, chills, chest pain, shortness of breath, abdominal pain, nausea vomiting diarrhea, dark or bloody stools, blood transfusions. Per EMS vital signs stable

## 2022-02-21 NOTE — H&P ADULT - ATTENDING COMMENTS
Patient seen and examined on 02/21/2022 on 22:46    HPI:  60 y/o gentleman with a past medical history of  CAD, s/p staged PCI CABG 2012, ESRD on HD M/W/F, Normocytic Anemia of Chronic Disease, DM Type II, Hypertension, DLD, TIA, PVD s/p left below knee popliteal to AT artery reverse saphenous vein graft in April 2017 and balloon plasty of his left lower extremity bypass graft in June 2020, and carotid stenosis s/p right carotid endarterectomy in June 2017 sent in from o/p dialysis (prior to starting HD) admitted for fluid overload. He was sent from HD after being found severely dyspneic after walking in. He notes increasing dyspnea on exertion and worsening LE edema over the last week. He suspected it may have been due to a medication mix-up. He was to take lasix 80 qD but was only taking 40mg instead. Denied any chest pain, palpitations, fevers, chills, abdo pain, n/v/d   Additionally he has a right heel ulcer, following weekly with his podiatrist.   Underwent HD here and started on bumex, her reports improvement in his breathing and has no active complaints at this time.     REVIEW OF SYSTEMS:  CONSTITUTIONAL:  No weakness, fevers, chills, night sweats, weight loss  EYES/ENT: No visual changes. No vertigo or dysphagia  NECK: No neck pain or stiffness  RESPIRATORY: No cough, wheezing, hemoptysis. +shortness of breath  CARDIOVASCULAR: No chest pain or palpitations. +lower extremity edema  GASTROINTESTINAL: No abdominal pain. No nausea, vomiting, diarrhea, or hematemesis  GENITOURINARY: No dysuria or hematuria   NEUROLOGICAL: No focal numbness or weakness  SKIN: No rashes or itching  HEMATOLOGIC: No easy bruising or prolonged bleeding.      PHYSICAL EXAM:  GENERAL: NAD, obese, Non-toxic, stated age   HEAD:  Atraumatic, Normocephalic  EYES: EOMI, Sclera White   NECK: Supple, No JVD  CHEST/LUNG: Clear to auscultation bilaterally; No wheezing, rhonchi, or crackles  HEART: Regular rate and rhythm; s1, s2, No murmurs, rubs, or gallops  ABDOMEN: Soft, Nontender, Nondistended; Bowel sounds present, No rebound or guarding noted   EXTREMITIES:  +2-3 pitting lower extremity edema  L>R. some calf tenderness to palpation noted.  No clubbing or cyanosis  R heel ulceration.   PSYCH: AAOx3, pleasant, cooperative, not anxious  NEUROLOGY: 5/5 strength in all extremities, no downward drift. Sensation grossly intact.   SKIN: No rashes or lesions      ASSESSMENT AND PLAN:  Dyspnea secondary to fluid overload   ESRD on HD   -Nephrology following   -Bumex started   -Trend I/O and electrolytes   -Cont phoslo   -Check venous duplex and 2D Echo     HTN --> uncontrolled   -nifedipine 30 mg started, increase as needed   -Cont with metoprolol     R. Heel ulcer:  -Podiatry eval for wound care recs     CAD s/p CABG   PAD s/p bypass graft   Cardotid artery stenosis s/p CEA   HLD  TIA  -Cont asa, plavix, and atorvastatin   -Check lipids     Anemia of chronic disease: at baseline  -Check Iron studies     DVT ppx: Heparin  GI ppx: Not indicated  GOC: Full code.    My note supersedes the residents in the event of a discrepancy. Patient seen and examined on 02/21/2022 on 22:46    HPI:  60 y/o gentleman with a past medical history of  CAD, s/p staged PCI CABG 2012, ESRD on HD M/W/F, Normocytic Anemia of Chronic Disease, DM Type II, Hypertension, DLD, TIA, PVD s/p left below knee popliteal to AT artery reverse saphenous vein graft in April 2017 and balloon plasty of his left lower extremity bypass graft in June 2020, and carotid stenosis s/p right carotid endarterectomy in June 2017 sent in from o/p dialysis (prior to starting HD) admitted for fluid overload. He was sent from HD after being found severely dyspneic after walking in. He notes increasing dyspnea on exertion and worsening LE edema over the last week. He suspected it may have been due to a medication mix-up. He was to take lasix 80 qD but was only taking 40mg instead. Denied any chest pain, palpitations, fevers, chills, abdo pain, n/v/d   Additionally he has a right heel ulcer, following weekly with his podiatrist.   Underwent HD here and started on bumex, her reports improvement in his breathing and has no active complaints at this time.     REVIEW OF SYSTEMS:  CONSTITUTIONAL:  No weakness, fevers, chills, night sweats, weight loss  EYES/ENT: No visual changes. No vertigo or dysphagia  NECK: No neck pain or stiffness  RESPIRATORY: No cough, wheezing, hemoptysis. +shortness of breath  CARDIOVASCULAR: No chest pain or palpitations. +lower extremity edema  GASTROINTESTINAL: No abdominal pain. No nausea, vomiting, diarrhea, or hematemesis  GENITOURINARY: No dysuria or hematuria   NEUROLOGICAL: No focal numbness or weakness  SKIN: No rashes or itching  HEMATOLOGIC: No easy bruising or prolonged bleeding.      PHYSICAL EXAM:  GENERAL: NAD, obese, Non-toxic, stated age   HEAD:  Atraumatic, Normocephalic  EYES: EOMI, Sclera White   NECK: Supple, No JVD  CHEST/LUNG: Clear to auscultation bilaterally; No wheezing, rhonchi, or crackles  HEART: Regular rate and rhythm; s1, s2, No murmurs, rubs, or gallops  ABDOMEN: Soft, Nontender, Nondistended; Bowel sounds present, No rebound or guarding noted   EXTREMITIES:  +2-3 pitting lower extremity edema  L>R. some calf tenderness to palpation noted.  No clubbing or cyanosis  R heel ulceration.   PSYCH: AAOx3, pleasant, cooperative, not anxious  NEUROLOGY: 5/5 strength in all extremities, no downward drift. Sensation grossly intact.   SKIN: No rashes or lesions      ASSESSMENT AND PLAN:  Dyspnea secondary to fluid overload   ESRD on HD   -Nephrology following   -Bumex started   -Trend I/O and electrolytes   -Cont phoslo   -Check venous duplex and 2D Echo     HTN --> uncontrolled   -nifedipine 30 mg started, increase as needed   -Cont with metoprolol     R. Heel ulcer:  -Podiatry eval for wound care recs     CAD s/p CABG   PAD s/p bypass graft   Cardotid artery stenosis s/p CEA   HLD  TIA  -Cont asa, plavix, and atorvastatin   -Check lipids     Anemia of chronic disease: at baseline  -Check Iron studies     Diabetes: Basal bolus insulin, keep fingerstick glucose <180.     DVT ppx: Heparin  GI ppx: Not indicated  GOC: Full code.    My note supersedes the residents in the event of a discrepancy. Patient seen and examined on 02/21/2022 on 22:46    HPI:  60 y/o gentleman with a past medical history of  CAD, s/p staged PCI CABG 2012, ESRD on HD M/W/F, Normocytic Anemia of Chronic Disease, DM Type II, Hypertension, DLD, TIA, PVD s/p left below knee popliteal to AT artery reverse saphenous vein graft in April 2017 and balloon plasty of his left lower extremity bypass graft in June 2020, and carotid stenosis s/p right carotid endarterectomy in June 2017 sent in from o/p dialysis (prior to starting HD) admitted for fluid overload. He was sent from HD after being found severely dyspneic after walking in. He notes increasing dyspnea on exertion and worsening LE edema over the last week. He suspected it may have been due to a medication mix-up. He was to take lasix 80 qD but was only taking 40mg instead. Denied any chest pain, palpitations, fevers, chills, abdo pain, n/v/d   Additionally he has a right heel ulcer, following weekly with his podiatrist.   Underwent HD here and started on bumex, her reports improvement in his breathing and has no active complaints at this time.     REVIEW OF SYSTEMS:  CONSTITUTIONAL:  No weakness, fevers, chills, night sweats, weight loss  EYES/ENT: No visual changes. No vertigo or dysphagia  NECK: No neck pain or stiffness  RESPIRATORY: No cough, wheezing, hemoptysis. +shortness of breath  CARDIOVASCULAR: No chest pain or palpitations. +lower extremity edema  GASTROINTESTINAL: No abdominal pain. No nausea, vomiting, diarrhea, or hematemesis  GENITOURINARY: No dysuria or hematuria   NEUROLOGICAL: No focal numbness or weakness  SKIN: No rashes or itching  HEMATOLOGIC: No easy bruising or prolonged bleeding.      PHYSICAL EXAM:  GENERAL: NAD, obese, Non-toxic, stated age   HEAD:  Atraumatic, Normocephalic  EYES: EOMI, Sclera White   NECK: Supple, No JVD  CHEST/LUNG: Clear to auscultation bilaterally; No wheezing, rhonchi, or crackles  HEART: Regular rate and rhythm; s1, s2, No murmurs, rubs, or gallops  ABDOMEN: Soft, Nontender, Nondistended; Bowel sounds present, No rebound or guarding noted   EXTREMITIES:  +2-3 pitting lower extremity edema  L>R. some calf tenderness to palpation noted.  No clubbing or cyanosis  R heel ulceration.   PSYCH: AAOx3, pleasant, cooperative, not anxious  NEUROLOGY: 5/5 strength in all extremities, no downward drift. Sensation grossly intact.   SKIN: No rashes or lesions      ASSESSMENT AND PLAN:  Dyspnea secondary to fluid overload   ESRD on HD   -Nephrology following   -Bumex started   -Trend I/O and electrolytes   -Cont phoslo   -Check venous duplex and 2D Echo     HTN --> uncontrolled   -nifedipine 30 mg started, increase as needed   -Cont with metoprolol     R. Heel ulcer:  -Podiatry eval for wound care recs   -Check ESR / CRP     CAD s/p CABG   PAD s/p bypass graft   Cardotid artery stenosis s/p CEA   HLD  TIA  -Cont asa, plavix, and atorvastatin   -Check lipids     Anemia of chronic disease: at baseline  -Check Iron studies     Diabetes: Basal bolus insulin, keep fingerstick glucose <180.     DVT ppx: Heparin  GI ppx: Not indicated  GOC: Full code.    My note supersedes the residents in the event of a discrepancy. Patient seen and examined on 02/21/2022 on 22:46    HPI:  60 y/o gentleman with a past medical history of  CAD, s/p staged PCI CABG 2012, ESRD on HD M/W/F, Normocytic Anemia of Chronic Disease, DM Type II, Hypertension, DLD, TIA, PVD s/p left below knee popliteal to AT artery reverse saphenous vein graft in April 2017 and balloon plasty of his left lower extremity bypass graft in June 2020, and carotid stenosis s/p right carotid endarterectomy in June 2017 sent in from o/p dialysis (prior to starting HD) admitted for fluid overload. He was sent from HD after being found severely dyspneic after walking in. He notes increasing dyspnea on exertion and worsening LE edema over the last week. He suspected it may have been due to a medication mix-up. He was to take lasix 80 qD but was only taking 40mg instead. Denied any chesta pain, palpitations, fevers, chills, abdo pain, n/v/d   Additionally he has a right heel ulcer, following weekly with his podiatrist.   Underwent HD here and started on bumex, her reports improvement in his breathing and has no active complaints at this time.     REVIEW OF SYSTEMS:  CONSTITUTIONAL:  No weakness, fevers, chills, night sweats, weight loss  EYES/ENT: No visual changes. No vertigo or dysphagia  NECK: No neck pain or stiffness  RESPIRATORY: No cough, wheezing, hemoptysis. +shortness of breath  CARDIOVASCULAR: No chest pain or palpitations. +lower extremity edema  GASTROINTESTINAL: No abdominal pain. No nausea, vomiting, diarrhea, or hematemesis  GENITOURINARY: No dysuria or hematuria   NEUROLOGICAL: No focal numbness or weakness  SKIN: No rashes or itching  HEMATOLOGIC: No easy bruising or prolonged bleeding.      PHYSICAL EXAM:  GENERAL: NAD, obese, Non-toxic, stated age   HEAD:  Atraumatic, Normocephalic  EYES: EOMI, Sclera White   NECK: Supple, No JVD  CHEST/LUNG: Clear to auscultation bilaterally; No wheezing, rhonchi, or crackles  HEART: Regular rate and rhythm; s1, s2, No murmurs, rubs, or gallops  ABDOMEN: Soft, Nontender, Nondistended; Bowel sounds present, No rebound or guarding noted   EXTREMITIES:  +2-3 pitting lower extremity edema  L>R. some calf tenderness to palpation noted.  No clubbing or cyanosis  R heel ulceration.   PSYCH: AAOx3, pleasant, cooperative, not anxious  NEUROLOGY: 5/5 strength in all extremities, no downward drift. Sensation grossly intact.   SKIN: No rashes or lesions      ASSESSMENT AND PLAN:  Dyspnea secondary to fluid overload   ESRD on HD   -Nephrology following   -Bumex started   -Trend I/O and electrolytes   -Cont phoslo   -Check venous duplex and 2D Echo     HTN --> uncontrolled   -nifedipine 30 mg started, increase as needed   -Cont with metoprolol     R. Heel ulcer:  -Podiatry eval for wound care recs   -Check ESR / CRP     CAD s/p CABG   PAD s/p bypass graft   Carotid artery stenosis s/p CEA   HLD  TIA  -Cont asa, plavix, and atorvastatin   -Check lipids     Anemia of chronic disease: at baseline  -Check Iron studies     Diabetes: Basal bolus insulin, keep fingerstick glucose <180.     DVT ppx: Heparin  GI ppx: Not indicated  GOC: Full code.    My note supersedes the residents in the event of a discrepancy.

## 2022-02-22 ENCOUNTER — TRANSCRIPTION ENCOUNTER (OUTPATIENT)
Age: 62
End: 2022-02-22

## 2022-02-22 LAB
ALBUMIN SERPL ELPH-MCNC: 3.7 G/DL — SIGNIFICANT CHANGE UP (ref 3.5–5.2)
ALP SERPL-CCNC: 154 U/L — HIGH (ref 30–115)
ALT FLD-CCNC: 31 U/L — SIGNIFICANT CHANGE UP (ref 0–41)
ANION GAP SERPL CALC-SCNC: 17 MMOL/L — HIGH (ref 7–14)
AST SERPL-CCNC: 24 U/L — SIGNIFICANT CHANGE UP (ref 0–41)
BASOPHILS # BLD AUTO: 0.09 K/UL — SIGNIFICANT CHANGE UP (ref 0–0.2)
BASOPHILS NFR BLD AUTO: 0.9 % — SIGNIFICANT CHANGE UP (ref 0–1)
BILIRUB SERPL-MCNC: 0.4 MG/DL — SIGNIFICANT CHANGE UP (ref 0.2–1.2)
BUN SERPL-MCNC: 47 MG/DL — HIGH (ref 10–20)
CALCIUM SERPL-MCNC: 7.7 MG/DL — LOW (ref 8.5–10.1)
CHLORIDE SERPL-SCNC: 102 MMOL/L — SIGNIFICANT CHANGE UP (ref 98–110)
CK MB CFR SERPL CALC: 5 NG/ML — SIGNIFICANT CHANGE UP (ref 0.6–6.3)
CO2 SERPL-SCNC: 27 MMOL/L — SIGNIFICANT CHANGE UP (ref 17–32)
CREAT SERPL-MCNC: 5.1 MG/DL — CRITICAL HIGH (ref 0.7–1.5)
CRP SERPL-MCNC: 16 MG/L — HIGH
EOSINOPHIL # BLD AUTO: 0.25 K/UL — SIGNIFICANT CHANGE UP (ref 0–0.7)
EOSINOPHIL NFR BLD AUTO: 2.4 % — SIGNIFICANT CHANGE UP (ref 0–8)
ERYTHROCYTE [SEDIMENTATION RATE] IN BLOOD: 15 MM/HR — HIGH (ref 0–10)
GLUCOSE BLDC GLUCOMTR-MCNC: 149 MG/DL — HIGH (ref 70–99)
GLUCOSE BLDC GLUCOMTR-MCNC: 205 MG/DL — HIGH (ref 70–99)
GLUCOSE BLDC GLUCOMTR-MCNC: 206 MG/DL — HIGH (ref 70–99)
GLUCOSE BLDC GLUCOMTR-MCNC: 220 MG/DL — HIGH (ref 70–99)
GLUCOSE SERPL-MCNC: 153 MG/DL — HIGH (ref 70–99)
HCT VFR BLD CALC: 32.8 % — LOW (ref 42–52)
HGB BLD-MCNC: 10.4 G/DL — LOW (ref 14–18)
IMM GRANULOCYTES NFR BLD AUTO: 0.5 % — HIGH (ref 0.1–0.3)
LYMPHOCYTES # BLD AUTO: 1.34 K/UL — SIGNIFICANT CHANGE UP (ref 1.2–3.4)
LYMPHOCYTES # BLD AUTO: 12.7 % — LOW (ref 20.5–51.1)
MAGNESIUM SERPL-MCNC: 2.1 MG/DL — SIGNIFICANT CHANGE UP (ref 1.8–2.4)
MCHC RBC-ENTMCNC: 29.8 PG — SIGNIFICANT CHANGE UP (ref 27–31)
MCHC RBC-ENTMCNC: 31.7 G/DL — LOW (ref 32–37)
MCV RBC AUTO: 94 FL — SIGNIFICANT CHANGE UP (ref 80–94)
MONOCYTES # BLD AUTO: 0.73 K/UL — HIGH (ref 0.1–0.6)
MONOCYTES NFR BLD AUTO: 6.9 % — SIGNIFICANT CHANGE UP (ref 1.7–9.3)
NEUTROPHILS # BLD AUTO: 8.07 K/UL — HIGH (ref 1.4–6.5)
NEUTROPHILS NFR BLD AUTO: 76.6 % — HIGH (ref 42.2–75.2)
NRBC # BLD: 0 /100 WBCS — SIGNIFICANT CHANGE UP (ref 0–0)
PHOSPHATE SERPL-MCNC: 5.2 MG/DL — HIGH (ref 2.1–4.9)
PLATELET # BLD AUTO: 200 K/UL — SIGNIFICANT CHANGE UP (ref 130–400)
POTASSIUM SERPL-MCNC: 4.2 MMOL/L — SIGNIFICANT CHANGE UP (ref 3.5–5)
POTASSIUM SERPL-SCNC: 4.2 MMOL/L — SIGNIFICANT CHANGE UP (ref 3.5–5)
PROT SERPL-MCNC: 6.8 G/DL — SIGNIFICANT CHANGE UP (ref 6–8)
RBC # BLD: 3.49 M/UL — LOW (ref 4.7–6.1)
RBC # FLD: 15 % — HIGH (ref 11.5–14.5)
SODIUM SERPL-SCNC: 146 MMOL/L — SIGNIFICANT CHANGE UP (ref 135–146)
TROPONIN T SERPL-MCNC: 0.2 NG/ML — CRITICAL HIGH
WBC # BLD: 10.53 K/UL — SIGNIFICANT CHANGE UP (ref 4.8–10.8)
WBC # FLD AUTO: 10.53 K/UL — SIGNIFICANT CHANGE UP (ref 4.8–10.8)

## 2022-02-22 PROCEDURE — 93010 ELECTROCARDIOGRAM REPORT: CPT

## 2022-02-22 PROCEDURE — 93970 EXTREMITY STUDY: CPT | Mod: 26

## 2022-02-22 PROCEDURE — 93925 LOWER EXTREMITY STUDY: CPT | Mod: 26

## 2022-02-22 PROCEDURE — 99233 SBSQ HOSP IP/OBS HIGH 50: CPT

## 2022-02-22 PROCEDURE — 73630 X-RAY EXAM OF FOOT: CPT | Mod: 26,RT

## 2022-02-22 RX ORDER — COLLAGENASE CLOSTRIDIUM HIST. 250 UNIT/G
1 OINTMENT (GRAM) TOPICAL DAILY
Refills: 0 | Status: DISCONTINUED | OUTPATIENT
Start: 2022-02-22 | End: 2022-02-28

## 2022-02-22 RX ORDER — LANOLIN ALCOHOL/MO/W.PET/CERES
5 CREAM (GRAM) TOPICAL AT BEDTIME
Refills: 0 | Status: DISCONTINUED | OUTPATIENT
Start: 2022-02-22 | End: 2022-02-28

## 2022-02-22 RX ORDER — NIFEDIPINE 30 MG
60 TABLET, EXTENDED RELEASE 24 HR ORAL DAILY
Refills: 0 | Status: DISCONTINUED | OUTPATIENT
Start: 2022-02-22 | End: 2022-02-28

## 2022-02-22 RX ADMIN — Medication 5 MILLIGRAM(S): at 01:36

## 2022-02-22 RX ADMIN — BUMETANIDE 2 MILLIGRAM(S): 0.25 INJECTION INTRAMUSCULAR; INTRAVENOUS at 05:09

## 2022-02-22 RX ADMIN — SEVELAMER CARBONATE 800 MILLIGRAM(S): 2400 POWDER, FOR SUSPENSION ORAL at 17:24

## 2022-02-22 RX ADMIN — Medication 50 MILLIGRAM(S): at 05:09

## 2022-02-22 RX ADMIN — CLOPIDOGREL BISULFATE 75 MILLIGRAM(S): 75 TABLET, FILM COATED ORAL at 12:12

## 2022-02-22 RX ADMIN — SEVELAMER CARBONATE 800 MILLIGRAM(S): 2400 POWDER, FOR SUSPENSION ORAL at 12:12

## 2022-02-22 RX ADMIN — Medication 60 MILLIGRAM(S): at 09:31

## 2022-02-22 RX ADMIN — Medication 81 MILLIGRAM(S): at 12:13

## 2022-02-22 RX ADMIN — SEVELAMER CARBONATE 800 MILLIGRAM(S): 2400 POWDER, FOR SUSPENSION ORAL at 08:36

## 2022-02-22 RX ADMIN — HEPARIN SODIUM 5000 UNIT(S): 5000 INJECTION INTRAVENOUS; SUBCUTANEOUS at 05:09

## 2022-02-22 RX ADMIN — Medication 1 APPLICATION(S): at 12:13

## 2022-02-22 RX ADMIN — HEPARIN SODIUM 5000 UNIT(S): 5000 INJECTION INTRAVENOUS; SUBCUTANEOUS at 17:25

## 2022-02-22 RX ADMIN — Medication 50 MILLIGRAM(S): at 17:24

## 2022-02-22 RX ADMIN — ATORVASTATIN CALCIUM 40 MILLIGRAM(S): 80 TABLET, FILM COATED ORAL at 21:28

## 2022-02-22 RX ADMIN — BUMETANIDE 2 MILLIGRAM(S): 0.25 INJECTION INTRAMUSCULAR; INTRAVENOUS at 17:24

## 2022-02-22 NOTE — PROGRESS NOTE ADULT - SUBJECTIVE AND OBJECTIVE BOX
60 yo M with PMHx of ESRD on HD M/W/F, Normocytic Anemia of Chronic Disease, DM Type II, Hypertension, DLD, TIA, CAD s/p staged PCI CABG 2012, PVD s/p left below knee popliteal to AT artery reverse saphenous vein graft in 4/2017 and balloon plasty of his left lower extremity bypass graft in 6/2020, and carotid stenosis s/p right carotid endarterectomy in 6/2017 sent in from o/p dialysis (prior to starting HD) for appearing pale. Admitted to inpatient for dyspnea 2/2 fluid overload, improved. Pt is  still hypertensive SBP 180s-190s. Dyspnea has improved. Will have short session of HD today. Pt in on HD for 2 years.  Was started on bumex.  Pt has a right heel ulcer for over one month, will follow up podiatry recommendations.   Today pt feels OK, denies SOB, not in pain, asking about discharge.         OBJECTIVE  PAST MEDICAL & SURGICAL HISTORY  CAD (coronary artery disease)    S/P CABG (coronary artery bypass graft)  x4    Diabetes mellitus    Transient ischemic attack (TIA)  2017; 2008    Chronic kidney disease (CKD)  Stage IV    Hypertension    Stented coronary artery  in 2008    Myocardial infarction  2012    PAD (peripheral artery disease)  S/p bypass left leg    HLD (hyperlipidemia)    BPH (benign prostatic hyperplasia)    Pain in left knee  s/p fall    OA (osteoarthritis)    [place] (hard of hearing)    Chronic anemia    S/P CABG (coronary artery bypass graft)  2012    H/O arterial bypass of lower limb  Left Lower Extremity (2016)    History of surgery  Left CEA (2017)  Left Pinkie toe Amputation (2014)  CABG x 4 (2012)  Card cath - stent (2008)        ALLERGIES:  No Known Allergies        HOME MEDICATIONS  Home Medications:  aspirin 81 mg oral tablet: 1 tab(s) orally once a day (21 Feb 2022 13:33)  furosemide 80 mg oral tablet: 1 tab(s) orally 2 times a day (21 Feb 2022 13:33)  Levemir 100 units/mL subcutaneous solution: 40 unit(s) subcutaneous once a day (at bedtime).  (21 Feb 2022 13:33)  Metoprolol Tartrate 50 mg oral tablet: 1 tab(s) orally 2 times a day (21 Feb 2022 13:33)  Plavix 75 mg oral tablet: 1 tab(s) orally once a day (21 Feb 2022 13:33)  Trulicity Pen: 1 milligram(s) subcutaneous once a week (21 Feb 2022 13:33)                             VITAL SIGNS: Last 24 Hours  T(C): 36.2 (22 Feb 2022 13:16), Max: 36.9 (21 Feb 2022 20:45)  T(F): 97.2 (22 Feb 2022 13:16), Max: 98.4 (21 Feb 2022 20:45)  HR: 69 (22 Feb 2022 13:30) (69 - 85)  BP: 146/85 (22 Feb 2022 13:30) (138/71 - 197/108)  BP(mean): 128 (21 Feb 2022 20:45) (128 - 147)  RR: 18 (21 Feb 2022 20:45) (18 - 18)      PHYSICAL EXAM:  General: NAD, disheveled with poor hygiene   HEENT: No cervical LAD, or JVD  LUNGS: decreased BS at basas   HEART: RRR, no murmur  ABDOMEN: NBS, soft, nontender to palpation  EXT: 2+  pitting edema with chronic changes, dressing noted on right foot   NEURO: AAOx3, no motor or sensory deficit   SKIN: atrophic, xerotic lower extremities    LABS:                        10.4   10.53 )-----------( 200      ( 22 Feb 2022 06:30 )             32.8     02-22    146  |  102  |  47<H>  ----------------------------<  153<H>  4.2   |  27  |  5.1<HH>    Ca    7.7<L>      22 Feb 2022 06:30  Phos  5.2     02-22  Mg     2.1     02-22    TPro  6.8  /  Alb  3.7  /  TBili  0.4  /  DBili  x   /  AST  24  /  ALT  31  /  AlkPhos  154<H>  02-22  CARDIAC MARKERS ( 21 Feb 2022 09:07 )  x     / 0.19 ng/mL / x     / x     / x            RADIOLOGY:      < from: Xray Chest 1 View-PORTABLE IMMEDIATE (Xray Chest 1 View-PORTABLE IMMEDIATE .) (02.21.22 @ 09:41) >  IMPRESSION:    Left pleural effusion/opacity.    < end of copied text >  < from: TTE Echo Complete w/o Contrast w/ Doppler (05.16.21 @ 09:14) >  Summary:   1. Left ventricular ejection fraction, by visual estimation, is 50 to 55%.   2. Moderately increased LV wall thickness.   3. Spectral Doppler shows impaired relaxation pattern of left ventricular myocardial filling (Grade I diastolic dysfunction).   4. Mild to moderately enlarged left atrium.   5. Normal right atrial size.   6. Mild mitral valve regurgitation.   7. Thickening and calcification of the anterior and posterior mitral valve leaflets.   8.Mild tricuspid regurgitation.   9. Color flow doppler and intravenous injection of agitated saline demonstrates the presence of an intact intra atrial septum.  10. There is moderate aortic root calcification.          MEDICATIONS  (STANDING):  aspirin enteric coated 81 milliGRAM(s) Oral daily  atorvastatin 40 milliGRAM(s) Oral at bedtime  buMETAnide 2 milliGRAM(s) Oral <User Schedule>  buMETAnide 2 milliGRAM(s) Oral <User Schedule>  chlorhexidine 4% Liquid 1 Application(s) Topical <User Schedule>  clopidogrel Tablet 75 milliGRAM(s) Oral daily  collagenase Ointment 1 Application(s) Topical daily  heparin   Injectable 5000 Unit(s) SubCutaneous every 12 hours  melatonin 5 milliGRAM(s) Oral at bedtime  metoprolol tartrate 50 milliGRAM(s) Oral two times a day  NIFEdipine XL 60 milliGRAM(s) Oral daily  sevelamer carbonate 800 milliGRAM(s) Oral three times a day with meals

## 2022-02-22 NOTE — CONSULT NOTE ADULT - SUBJECTIVE AND OBJECTIVE BOX
Podiatry Consult Note    Subjective:  SCHWABACHER, LAWRENCE is a pleasant well-nourished, well developed 61y Male in no acute distress, alert awake, and oriented to person, place and time.   Patient is a 61y old  Male who presents with a chief complaint of R heel wound, onset 1 month ago. States he sees Dr. Pacheco for regular foot and wound care, says he has an appt w/Dr. Pacheco tomorrow. Denies N/V/F/C/pain/SOB. Pt seen & evaluated at bedside.    HPI:  62 yo M with PMHx of CAD, s/p staged PCI CABG 2012, ESRD on HD M/W/F, Normocytic Anemia of Chronic Disease, DM Type II, Hypertension, DLD, TIA, PVD s/p left below knee popliteal to AT artery reverse saphenous vein graft in April 2017 and balloon plasty of his left lower extremity bypass graft in June 2020, and carotid stenosis s/p right carotid endarterectomy in June 2017 sent in from o/p dialysis (prior to starting HD) because he appeared pale. Pt does endorse some lethargy when he arrived at dialysis center today. He also reports some intermittent SOB with non-productive cough over the past month which is worse with exertion or when laying down. Denies any CP or palpitations today or over the past month. He was prescribed 80mg lasix PO BID but does not seem like he was taking this dose. His diet is somewhat controlled but still not ideal and he drinks lots of coffee/tea throughout the day.    In the ED: /101, vitals otherwise wnl  Trop 0.19, lower than previous  BNP >70K (21 Feb 2022 12:34)    PAST MEDICAL & SURGICAL HISTORY:  CAD (coronary artery disease)    S/P CABG (coronary artery bypass graft)  x4    Diabetes mellitus    Transient ischemic attack (TIA)  2017; 2008    Chronic kidney disease (CKD)  Stage IV    Hypertension    Stented coronary artery  in 2008    Myocardial infarction  2012    PAD (peripheral artery disease)  S/p bypass left leg    HLD (hyperlipidemia)    BPH (benign prostatic hyperplasia)    Pain in left knee  s/p fall    OA (osteoarthritis)    [place] (hard of hearing)    Chronic anemia    S/P CABG (coronary artery bypass graft)  2012    H/O arterial bypass of lower limb  Left Lower Extremity (2016)    History of surgery  Left CEA (2017)  Left Pinkie toe Amputation (2014)  CABG x 4 (2012)  Card cath - stent (2008)      Objective:  Vital Signs Last 24 Hrs  T(C): 36.9 (21 Feb 2022 20:45), Max: 36.9 (21 Feb 2022 20:45)  T(F): 98.4 (21 Feb 2022 20:45), Max: 98.4 (21 Feb 2022 20:45)  HR: 81 (21 Feb 2022 20:45) (72 - 85)  BP: 184/89 (21 Feb 2022 20:45) (184/89 - 197/108)  BP(mean): 128 (21 Feb 2022 20:45) (128 - 147)  RR: 18 (21 Feb 2022 20:45) (17 - 18)  SpO2: --                        9.9    10.81 )-----------( 205      ( 21 Feb 2022 08:11 )             30.9                 02-21    141  |  102  |  61<HH>  ----------------------------<  174<H>  4.6   |  24  |  6.3<HH>    Ca    7.5<L>      21 Feb 2022 08:11    TPro  6.7  /  Alb  3.7  /  TBili  0.3  /  DBili  x   /  AST  27  /  ALT  31  /  AlkPhos  178<H>  02-21        Physical Exam - Lower Extremity Focused:   #Right Lower Extremity  Derm:   Open Right Plantar Heel Ulcer   Wound Probes to Soft Tissue Bone w/ Minimal Drainage; Wound Base Fibrogranular (90% fibrotic, 10% granular). Wound margins well demarcated. Periwound: wound margins well demarcated. Minimal erythema.   Foot is warm, capillary refill is instant to the toes   Mild mottling of skin to foot & distal leg    Vascular: DP and PT Pulses Diminished; Foot is Warm to Warm to the touch   Neuro: Protective Sensation Diminished / Moderately Neuropathic   MSK: No Pain On Palpation at Wound Site     Assessment:  Right Plantar Heel Ulcer - Probes to Soft Tissue    Plan:  Chart reviewed and Patient evaluated. All Questions and Concerns Addressed and Answered  Discussed diagnosis and treatment with patient  Wound Flushed w/ NS; Wound Packed w/ Saline-Soaked Gauze / DSD / Kerlix   Will order Santyl  Local Wound Care; As Stated Above   XR Imaging Right Foot; Pending Results  Lower Extremity Venous Duplex B/L ordered, will f/u Results  Recommend; Lower Extremity Arterial Duplex B/L; ESR/CRP  Possible Surgical Debridement; Pending Arterial Duplex, ESR/CRP and XR Imaging  Discussed Plan w/ Attending    Podiatry

## 2022-02-22 NOTE — PROGRESS NOTE ADULT - SUBJECTIVE AND OBJECTIVE BOX
seen and examined  no new complaints   s/p hd yesterday         PAST HISTORY  --------------------------------------------------------------------------------  No significant changes to PMH, PSH, FHx, SHx, unless otherwise noted    ALLERGIES & MEDICATIONS  --------------------------------------------------------------------------------  Allergies    No Known Allergies    Intolerances      Standing Inpatient Medications  aspirin enteric coated 81 milliGRAM(s) Oral daily  atorvastatin 40 milliGRAM(s) Oral at bedtime  buMETAnide 2 milliGRAM(s) Oral <User Schedule>  buMETAnide 2 milliGRAM(s) Oral <User Schedule>  chlorhexidine 4% Liquid 1 Application(s) Topical <User Schedule>  clopidogrel Tablet 75 milliGRAM(s) Oral daily  collagenase Ointment 1 Application(s) Topical daily  heparin   Injectable 5000 Unit(s) SubCutaneous every 12 hours  melatonin 5 milliGRAM(s) Oral at bedtime  metoprolol tartrate 50 milliGRAM(s) Oral two times a day  NIFEdipine XL 60 milliGRAM(s) Oral daily  sevelamer carbonate 800 milliGRAM(s) Oral three times a day with meals          VITALS/PHYSICAL EXAM  --------------------------------------------------------------------------------  T(C): 36.9 (02-21-22 @ 20:45), Max: 36.9 (02-21-22 @ 20:45)  HR: 81 (02-21-22 @ 20:45) (72 - 85)  BP: 184/89 (02-21-22 @ 20:45) (184/89 - 197/108)  RR: 18 (02-21-22 @ 20:45) (17 - 18)  SpO2: --  Wt(kg): --  Height (cm): 170.2 (02-21-22 @ 07:16)  Weight (kg): 103.4 (02-21-22 @ 07:16)  BMI (kg/m2): 35.7 (02-21-22 @ 07:16)  BSA (m2): 2.14 (02-21-22 @ 07:16)      02-21-22 @ 07:01  -  02-22-22 @ 07:00  --------------------------------------------------------  IN: 0 mL / OUT: 3200 mL / NET: -3200 mL      Physical Exam:  	Gen: NAD,  	Pulm:  B/L ronchi at bases   	CV: S1S2; no rub  	Abd: +distended  	LE:  edema  	Vascular access:av     LABS/STUDIES  --------------------------------------------------------------------------------              10.4   10.53 >-----------<  200      [02-22-22 @ 06:30]              32.8     146  |  102  |  47  ----------------------------<  153      [02-22-22 @ 06:30]  4.2   |  27  |  5.1        Ca     7.7     [02-22-22 @ 06:30]      Mg     2.1     [02-22-22 @ 06:30]      Phos  5.2     [02-22-22 @ 06:30]    TPro  6.8  /  Alb  3.7  /  TBili  0.4  /  DBili  x   /  AST  24  /  ALT  31  /  AlkPhos  154  [02-22-22 @ 06:30]        Troponin 0.19      [02-21-22 @ 09:07]    Creatinine Trend:  SCr 5.1 [02-22 @ 06:30]  SCr 6.3 [02-21 @ 08:11]        HbA1c 5.8      [01-30-20 @ 06:46]

## 2022-02-22 NOTE — DISCHARGE NOTE PROVIDER - PROVIDER TOKENS
PROVIDER:[TOKEN:[[id number]],SCHEDULEDAPPT:[03/22/2022],SCHEDULEDAPPTTIME:[09:45 AM]],PROVIDER:[TOKEN:[[id number]],FOLLOWUP:[1 month]],PROVIDER:[TOKEN:[[id number]],SCHEDULEDAPPT:[07/01/2022],SCHEDULEDAPPTTIME:[09:00 AM]],PROVIDER:[TOKEN:[[id number]],SCHEDULEDAPPT:[03/10/2022],SCHEDULEDAPPTTIME:[08:30 AM]] PROVIDER:[TOKEN:[[id number]],SCHEDULEDAPPT:[03/22/2022],SCHEDULEDAPPTTIME:[09:45 AM]],PROVIDER:[TOKEN:[[id number]],FOLLOWUP:[1 month]],PROVIDER:[TOKEN:[[id number]],SCHEDULEDAPPT:[07/01/2022],SCHEDULEDAPPTTIME:[09:00 AM]],PROVIDER:[TOKEN:[[id number]],SCHEDULEDAPPT:[03/10/2022],SCHEDULEDAPPTTIME:[08:30 AM]],PROVIDER:[TOKEN:[[id number]],FOLLOWUP:[2 weeks]]

## 2022-02-22 NOTE — DISCHARGE NOTE PROVIDER - NSDCFUSCHEDAPPT_GEN_ALL_CORE_FT
SCHWABACHER, LAWRENCE ; 03/10/2022 ; [facility] [address] SCHWABACHER, LAWRENCE ; 03/10/2022 ; [facility] [address] [facility]

## 2022-02-22 NOTE — PROGRESS NOTE ADULT - ASSESSMENT
# ESRD on HD   # Hypervolemia  # HTN  # Normocytic anemia / CKD  # MBD    Recommendations:  -  s/p hd yesterday , hd again today , patient volume overload   - pt is non-oliguric, continue bumex  -  trend trop/serial EKG/ cardiology evaluation   - phos level noted , on sevelamer  - increase nifedipine to 90 if BP remains levated after HD   - will follow

## 2022-02-22 NOTE — DISCHARGE NOTE PROVIDER - CARE PROVIDERS DIRECT ADDRESSES
,DirectAddress_Unknown,[email or web address] ,[email or web address],[email or web address],DirectAddress_Unknown

## 2022-02-22 NOTE — DISCHARGE NOTE PROVIDER - HOSPITAL COURSE
60 yo M with PMHx of CAD, s/p staged PCI CABG 2012, ESRD on HD M/W/F, Normocytic Anemia of Chronic Disease, DM Type II, Hypertension, DLD, TIA, PVD s/p left below knee popliteal to AT artery reverse saphenous vein graft in April 2017 and balloon plasty of his left lower extremity bypass graft in June 2020, and carotid stenosis s/p right carotid endarterectomy in June 2017 sent in from o/p dialysis (prior to starting HD) because he appeared pale, feeling short of breath at home. At the ED, patient had high /101, trops 0.19 which was lower than previous, and a BNP > 70K. Patient also endorsed that he was only taking 40mg lasix instead of 80mg because of mistake in understanding. Patient had HD session on day of admission. Nephrology was consulted and started patient on bumex regimen. Patient was admitted to general medicine for shortness of breath 2/2 fluid overload. His dyspnea improved at the hospital. He was very hypertensive during his hospital stay with SBP 180s-190s. He had an additional short HD session the following day. Patient was also started on 60mg of nifedpine to maintain BPs. His BPs remained well controlled after HD session and medication adjustment. Patient was also noted to have a right wound heel ulcer. Podiatry team assessed patient- recommended [facility] arterial duplex of feet for eavluation. No complications during his hospital stay. He will need to follow up with his nephrologist outpatient. 60 yo M with PMHx of CAD, s/p staged PCI CABG 2012, ESRD on HD M/W/F, Normocytic Anemia of Chronic Disease, DM Type II, Hypertension, DLD, TIA, PVD s/p left below knee popliteal to AT artery reverse saphenous vein graft in April 2017 and balloon plasty of his left lower extremity bypass graft in June 2020, and carotid stenosis s/p right carotid endarterectomy in June 2017 sent in from o/p dialysis (prior to starting HD) because he appeared pale, feeling short of breath at home. At the ED, patient had high /101, trops 0.19 which was lower than previous, and a BNP > 70K. Patient also endorsed that he was only taking 40mg lasix instead of 80mg because of mistake in understanding. Patient had HD session on day of admission. Nephrology was consulted and started patient on bumex regimen. Patient was admitted to general medicine for shortness of breath 2/2 fluid overload. His dyspnea improved at the hospital. He was very hypertensive during his hospital stay with SBP 180s-190s. He had an additional short HD session the following day. Patient was also started on 60mg of nifedpine to maintain BPs. His BPs remained well controlled after HD session and medication adjustment. Patient was also noted to have a right wound heel ulcer. Podiatry team assessed patient- recommended VA arterial duplex of feet for eavluation. Duplex with stenosis in RLE arterial vessels. Underwent v Angiogram infrapopliteal, Atherectomy right superficial femoral artery, Balloon angioplasty of Anterior tibial artery, Atherectomy of anterior tibial artery Podiatry then operated on pt R heel ulcer, s/p debridement. Found to have OM on MRI. Cultures were taken, grew ecoli pansensitive. ID recommend  cefepime 2g on on HD days, flagyl 500 TID. No complications during his hospital stay. He will need to follow up with his nephrologist outpatient. Follow up podiatry outpatient. D/c home with [facility]

## 2022-02-22 NOTE — PROGRESS NOTE ADULT - ASSESSMENT
ASSESSMENT & PLAN  60 yo M with PMHx of ESRD on HD M/W/F, Normocytic Anemia of Chronic Disease, DM Type II, Hypertension, DLD, TIA, CAD s/p staged PCI CABG 2012, PVD s/p left below knee popliteal to AT artery reverse saphenous vein graft in 4/2017 and balloon plasty of his left lower extremity bypass graft in 6/2020, and carotid stenosis s/p right carotid endarterectomy in 6/2017 sent in from o/p dialysis (prior to starting HD) for appearing pale. Admitted to inpatient for dyspnea 2/2 fluid overload, improved.       # ESRD on HD / Fluid Overload/ acute on chronic diastolic CHF / Poorly controlled HTN  - fluid restriction 1000 ml in 24 hours, renal diet   - intake and monitor monitoring   - c/w Bumex   - HD today   - c/w sevelamer TID  - monitor electrolytes   - f/u repeat 2Decho   - c/w BB , add Nifedipine, maintain fluid balance for better BP control        # DFL / h/o PVD   - consulted by podiatry, recommendations noted   -  F/U [facility] Duplex Venous and Arterial  - s/p left below knee popliteal to AT artery reverse saphenous vein graft in 4/2017 and balloon plasty of his left lower extremity bypass graft in 6/2020 # Carotid stenosis s/p right carotid endarterectomy in 6/ 2017  - c/w local wound care  - tight glucemic control  - educate pt about appropriate shoes wear       # Anemia chronic disease  - monitor H/H, stable for now     # DM2  - carb consistent diet   - monitor finger stick   - Lantus 40 mg QHS and 12 units on Lispro with meals   - check Hb A1C     # DLD  - c/w statin    # CAD   - s/p staged PCI CABG 2012  - c/w asa 81mg, plavix, and BB      DVT ppx: heparin SC  GI ppx: NA  Diet: DASH, CC, renal, fluid restricted  Code Status: Full Code    #Progress Note Handoff  Pending (specify):  HD today, start CHF protocol, f/u venous and arterial Doppler, podiatry follow up, check Hb A1C, BP control   Family discussion: I spoke with pt, he agreed with a plan of care   Disposition: Home_x __/SNF___/Other________/Unknown at this time________

## 2022-02-22 NOTE — DISCHARGE NOTE PROVIDER - NPI NUMBER (FOR SYSADMIN USE ONLY) :
[[id number]],[[id number]] [[id number]],[[id number]],[[id number]],[[id number]]

## 2022-02-22 NOTE — PROGRESS NOTE ADULT - ASSESSMENT
ASSESSMENT & PLAN    60 yo M with PMHx of ESRD on HD M/W/F, Normocytic Anemia of Chronic Disease, DM Type II, Hypertension, DLD, TIA, CAD s/p staged PCI CABG 2012, PVD s/p left below knee popliteal to AT artery reverse saphenous vein graft in 4/2017 and balloon plasty of his left lower extremity bypass graft in 6/2020, and carotid stenosis s/p right carotid endarterectomy in 6/2017 sent in from o/p dialysis (prior to starting HD) for appearing pale. Admitted to inpatient for dyspnea 2/2 fluid overload    # ESRD on HD MWF  # Fluid Overload; suspected due to diet/fluid intake+ incorrect use of home lasix; r/o cardiac cause  - due for HD today, did not miss any sessions as o/p; currently undergoing HD  - diet and fluid intake not ideal and seems meds were not being taken properly  - switched from lasix to PO bumex as per nephro recs; f/u official note  - c/w sevelamer TID  - f/u AM BMP, Mg, phos  - BNP >70K  - TTE 5/2021 with EF 50-55% and grade 1 diastolic dysfunction; f/u repeat echo    # Anemia chronic disease  - Hb 9.9, mildly decreased from baseline  - f/u AM repeat    # DM  - takes lantus 40 units at night and trulicity weekly  - monitor FS and start insulin regimen if consistently >180    # HTN  - initially elevated in ED  - only on metoprolol 50mg BID at home  - monitor BP after HD    # DLD  - c/w statin    # CAD s/p staged PCI CABG 2012  # PVD s/p left below knee popliteal to AT artery reverse saphenous vein graft in 4/2017 and balloon plasty of his left lower extremity bypass graft in 6/2020 # Carotid stenosis s/p right carotid endarterectomy in 6/ 2017  - c/w asa 81mg, plavix, and BB      DVT ppx: heparin SC  GI ppx: NA  Diet: DASH, CC, renal, fluid restricted  Code Status: Full Code ASSESSMENT & PLAN    60 yo M with PMHx of ESRD on HD M/W/F, Normocytic Anemia of Chronic Disease, DM Type II, Hypertension, DLD, TIA, CAD s/p staged PCI CABG 2012, PVD s/p left below knee popliteal to AT artery reverse saphenous vein graft in 4/2017 and balloon plasty of his left lower extremity bypass graft in 6/2020, and carotid stenosis s/p right carotid endarterectomy in 6/2017 sent in from o/p dialysis (prior to starting HD) for appearing pale. Admitted to inpatient for dyspnea 2/2 fluid overload, improved.     # ESRD on HD MWF, non-oliguric  # Fluid Overload; suspected due to diet/fluid intake+ incorrect use of home lasix; r/o cardiac cause  - due for HD today, did not miss any sessions as o/p;   - diet and fluid intake not ideal and seems meds were not being taken properly  - switched from lasix to PO bumex. nephro recs reviewed: continue bumex, increase nifedipine to 90 if BP is elevated after HD  - c/w sevelamer TID  - f/u AM BMP, Mg, phos  - BNP >70K  - TTE 5/2021 with EF 50-55% and grade 1 diastolic dysfunction; f/u repeat echo    #Elevated trops- 0.19: likely 2/2 CKD  - no active chest pain sx  - lower than previous levels- 0.25    # Anemia chronic disease- stable. Hb 10.4  - On admission Hb 9.9, mildly decreased from baseline    # DM2  - takes lantus 40 units at night and trulicity weekly  - monitor FS and start insulin regimen if consistently >180    # HTN  - initially elevated in ED  - only on metoprolol 50mg BID at home  - monitor BP after HD    # DLD  - c/w statin    # CAD s/p staged PCI CABG 2012  # PVD s/p left below knee popliteal to AT artery reverse saphenous vein graft in 4/2017 and balloon plasty of his left lower extremity bypass graft in 6/2020 # Carotid stenosis s/p right carotid endarterectomy in 6/ 2017  - c/w asa 81mg, plavix, and BB      DVT ppx: heparin SC  GI ppx: NA  Diet: DASH, CC, renal, fluid restricted  Code Status: Full Code  Handoff: f/u BPs after HD, may need to adjust HTN meds, on bumex.  ASSESSMENT & PLAN    60 yo M with PMHx of ESRD on HD M/W/F, Normocytic Anemia of Chronic Disease, DM Type II, Hypertension, DLD, TIA, CAD s/p staged PCI CABG 2012, PVD s/p left below knee popliteal to AT artery reverse saphenous vein graft in 4/2017 and balloon plasty of his left lower extremity bypass graft in 6/2020, and carotid stenosis s/p right carotid endarterectomy in 6/2017 sent in from o/p dialysis (prior to starting HD) for appearing pale. Admitted to inpatient for dyspnea 2/2 fluid overload, improved.     # ESRD on HD MWF, non-oliguric  # Fluid Overload; suspected due to diet/fluid intake+ incorrect use of home lasix; r/o cardiac cause  - due for HD today, did not miss any sessions as o/p;   - diet and fluid intake not ideal and seems meds were not being taken properly  - switched from lasix to PO bumex. nephro recs reviewed: continue bumex, increase nifedipine to 90 if BP is elevated after HD  - c/w sevelamer TID  - f/u AM BMP, Mg, phos  - BNP >70K  - TTE 5/2021 with EF 50-55% and grade 1 diastolic dysfunction; f/u repeat echo    #Elevated trops- 0.19: likely 2/2 CKD  - no active chest pain sx  - lower than previous levels- 0.25    #Right hell ulcer  - Podiatry recs reviewed. F/U [facility] Duplex Venous and Arterial. Patient has clinic appointment tomorrow with podiatry    # Anemia chronic disease- stable. Hb 10.4  - On admission Hb 9.9, mildly decreased from baseline    # DM2  - takes lantus 40 units at night and trulicity weekly  - monitor FS and start insulin regimen if consistently >180    # HTN  - initially elevated in ED  - only on metoprolol 50mg BID at home  - monitor BP after HD    # DLD  - c/w statin    # CAD s/p staged PCI CABG 2012  # PVD s/p left below knee popliteal to AT artery reverse saphenous vein graft in 4/2017 and balloon plasty of his left lower extremity bypass graft in 6/2020 # Carotid stenosis s/p right carotid endarterectomy in 6/ 2017  - c/w asa 81mg, plavix, and BB      DVT ppx: heparin SC  GI ppx: NA  Diet: DASH, CC, renal, fluid restricted  Code Status: Full Code  Handoff: f/u BPs after HD, may need to adjust HTN meds, on bumex.

## 2022-02-22 NOTE — DISCHARGE NOTE PROVIDER - CARE PROVIDER_API CALL
Orlando Sandoval)  [address]  [address] [place] [address]  Phone: [phone]  Fax: [phone]  Scheduled Appointment: 03/22/2022 09:45 AM    Kory Lara)  Internal Medicine; Nephrology  [address] [place] [address]  Phone: [phone]  Fax: [phone]  Follow Up Time: 1 month    John Malik)  Surgery; Vascular Surgery  [address] [place] [address]  Phone: [phone]  Fax: [phone]  Scheduled Appointment: 07/01/2022 09:00 AM    Landen Rogers)  EEGEpilepsy; Neurology  [address] [place] [address], [address]  [place] [address]  Phone: [phone]  Fax: [phone]  Scheduled Appointment: 03/10/2022 08:30 AM   Orlando Sandoval)  [id number]  [address] [place] [address]  Phone: [phone]  Fax: [phone]  Scheduled Appointment: 03/22/2022 09:45 AM    Kory Lara)  Internal Medicine; Nephrology  [address] [place] [address]  Phone: [phone]  Fax: [phone]  Follow Up Time: 1 month    John Malik)  Surgery; Vascular Surgery  [address] [place] [address]  Phone: [phone]  Fax: [phone]  Scheduled Appointment: 07/01/2022 09:00 AM    Landen Rogers)  EEGEpilepsy; Neurology  [address] [place] [address], [address]  [place] [address]  Phone: [phone]  Fax: [phone]  Scheduled Appointment: 03/10/2022 08:30 AM    Nikolai Pacheco  Podiatric Medicine and Surgery  [address] [place] [address]  Phone: [phone]  Fax: [phone]  Follow Up Time: 2 weeks

## 2022-02-22 NOTE — PROGRESS NOTE ADULT - SUBJECTIVE AND OBJECTIVE BOX
LAWRENCE SCHWABACHER 61y Male  MRN#: [id number]   CODE STATUS: full code    Hospital Day: 1d    Pt is currently admitted with the primary diagnosis of dyspnea, fluid overload    SUBJECTIVE  Hospital Course    Overnight events: 24 hour events reviewed. still hypertensive SBP 180s-190s. Dyspnea has improved. Will have short session of HD today. Patient has no subjective complaints today.                                                 ----------------------------------------------------------  OBJECTIVE  PAST MEDICAL & SURGICAL HISTORY  CAD (coronary artery disease)    S/P CABG (coronary artery bypass graft)  x4    Diabetes mellitus    Transient ischemic attack (TIA)  2017; 2008    Chronic kidney disease (CKD)  Stage IV    Hypertension    Stented coronary artery  in 2008    Myocardial infarction  2012    PAD (peripheral artery disease)  S/p bypass left leg    HLD (hyperlipidemia)    BPH (benign prostatic hyperplasia)    Pain in left knee  s/p fall    OA (osteoarthritis)    [place] (hard of hearing)    Chronic anemia    S/P CABG (coronary artery bypass graft)  2012    H/O arterial bypass of lower limb  Left Lower Extremity (2016)    History of surgery  Left CEA (2017)  Left Pinkie toe Amputation (2014)  CABG x 4 (2012)  Card cath - stent (2008)                                                -----------------------------------------------------------  ALLERGIES:  No Known Allergies                                            ------------------------------------------------------------    HOME MEDICATIONS  Home Medications:  aspirin 81 mg oral tablet: 1 tab(s) orally once a day (21 Feb 2022 13:33)  furosemide 80 mg oral tablet: 1 tab(s) orally 2 times a day (21 Feb 2022 13:33)  Levemir 100 units/mL subcutaneous solution: 40 unit(s) subcutaneous once a day (at bedtime).  (21 Feb 2022 13:33)  Metoprolol Tartrate 50 mg oral tablet: 1 tab(s) orally 2 times a day (21 Feb 2022 13:33)  Plavix 75 mg oral tablet: 1 tab(s) orally once a day (21 Feb 2022 13:33)  Trulicity Pen: 1 milligram(s) subcutaneous once a week (21 Feb 2022 13:33)                           MEDICATIONS:  STANDING MEDICATIONS  aspirin enteric coated 81 milliGRAM(s) Oral daily  atorvastatin 40 milliGRAM(s) Oral at bedtime  buMETAnide 2 milliGRAM(s) Oral <User Schedule>  buMETAnide 2 milliGRAM(s) Oral <User Schedule>  chlorhexidine 4% Liquid 1 Application(s) Topical <User Schedule>  clopidogrel Tablet 75 milliGRAM(s) Oral daily  collagenase Ointment 1 Application(s) Topical daily  heparin   Injectable 5000 Unit(s) SubCutaneous every 12 hours  melatonin 5 milliGRAM(s) Oral at bedtime  metoprolol tartrate 50 milliGRAM(s) Oral two times a day  NIFEdipine XL 60 milliGRAM(s) Oral daily  sevelamer carbonate 800 milliGRAM(s) Oral three times a day with meals    PRN MEDICATIONS                                            ------------------------------------------------------------  VITAL SIGNS: Last 24 Hours  T(C): 36.9 (21 Feb 2022 20:45), Max: 36.9 (21 Feb 2022 20:45)  T(F): 98.4 (21 Feb 2022 20:45), Max: 98.4 (21 Feb 2022 20:45)  HR: 81 (21 Feb 2022 20:45) (72 - 85)  BP: 184/89 (21 Feb 2022 20:45) (184/89 - 197/108)  BP(mean): 128 (21 Feb 2022 20:45) (128 - 147)  RR: 18 (21 Feb 2022 20:45) (17 - 18)  SpO2: --      02-21-22 @ 07:01  -  02-22-22 @ 07:00  --------------------------------------------------------  IN: 0 mL / OUT: 3200 mL / NET: -3200 mL                                             --------------------------------------------------------------  LABS:                        10.4   10.53 )-----------( 200      ( 22 Feb 2022 06:30 )             32.8     02-22    146  |  102  |  47<H>  ----------------------------<  153<H>  4.2   |  27  |  5.1<HH>    Ca    7.7<L>      22 Feb 2022 06:30  Phos  5.2     02-22  Mg     2.1     02-22    TPro  6.8  /  Alb  3.7  /  TBili  0.4  /  DBili  x   /  AST  24  /  ALT  31  /  AlkPhos  154<H>  02-22                  CARDIAC MARKERS ( 21 Feb 2022 09:07 )  x     / 0.19 ng/mL / x     / x     / x                                                  -------------------------------------------------------------  RADIOLOGY:                                            --------------------------------------------------------------    PHYSICAL EXAM:  General: Well appearing, not in acute distress  HEENT: No cervical LAD, or JVD  LUNGS: CTAB, no rales, on RA  HEART: RRR, no murmur  ABDOMEN: NBS, soft, nontender to palpation  EXT: mild peripheral pitting edema 1+ of lower extremities b/l  NEURO: AAOx3  SKIN: atrophic, xerotic lower extremities                                           --------------------------------------------------------------         LAWRENCE SCHWABACHER 61y Male  MRN#: [id number]   CODE STATUS: full code    Hospital Day: 1d    Pt is currently admitted with the primary diagnosis of dyspnea, fluid overload    SUBJECTIVE  Hospital Course    Overnight events: 24 hour events reviewed. still hypertensive SBP 180s-190s. Dyspnea has improved. Will have short session of HD today. Patient has no subjective complaints today. Was started on bumex.                                                ----------------------------------------------------------  OBJECTIVE  PAST MEDICAL & SURGICAL HISTORY  CAD (coronary artery disease)    S/P CABG (coronary artery bypass graft)  x4    Diabetes mellitus    Transient ischemic attack (TIA)  2017; 2008    Chronic kidney disease (CKD)  Stage IV    Hypertension    Stented coronary artery  in 2008    Myocardial infarction  2012    PAD (peripheral artery disease)  S/p bypass left leg    HLD (hyperlipidemia)    BPH (benign prostatic hyperplasia)    Pain in left knee  s/p fall    OA (osteoarthritis)    [place] (hard of hearing)    Chronic anemia    S/P CABG (coronary artery bypass graft)  2012    H/O arterial bypass of lower limb  Left Lower Extremity (2016)    History of surgery  Left CEA (2017)  Left Pinkie toe Amputation (2014)  CABG x 4 (2012)  Card cath - stent (2008)                                                -----------------------------------------------------------  ALLERGIES:  No Known Allergies                                            ------------------------------------------------------------    HOME MEDICATIONS  Home Medications:  aspirin 81 mg oral tablet: 1 tab(s) orally once a day (21 Feb 2022 13:33)  furosemide 80 mg oral tablet: 1 tab(s) orally 2 times a day (21 Feb 2022 13:33)  Levemir 100 units/mL subcutaneous solution: 40 unit(s) subcutaneous once a day (at bedtime).  (21 Feb 2022 13:33)  Metoprolol Tartrate 50 mg oral tablet: 1 tab(s) orally 2 times a day (21 Feb 2022 13:33)  Plavix 75 mg oral tablet: 1 tab(s) orally once a day (21 Feb 2022 13:33)  Trulicity Pen: 1 milligram(s) subcutaneous once a week (21 Feb 2022 13:33)                           MEDICATIONS:  STANDING MEDICATIONS  aspirin enteric coated 81 milliGRAM(s) Oral daily  atorvastatin 40 milliGRAM(s) Oral at bedtime  buMETAnide 2 milliGRAM(s) Oral <User Schedule>  buMETAnide 2 milliGRAM(s) Oral <User Schedule>  chlorhexidine 4% Liquid 1 Application(s) Topical <User Schedule>  clopidogrel Tablet 75 milliGRAM(s) Oral daily  collagenase Ointment 1 Application(s) Topical daily  heparin   Injectable 5000 Unit(s) SubCutaneous every 12 hours  melatonin 5 milliGRAM(s) Oral at bedtime  metoprolol tartrate 50 milliGRAM(s) Oral two times a day  NIFEdipine XL 60 milliGRAM(s) Oral daily  sevelamer carbonate 800 milliGRAM(s) Oral three times a day with meals    PRN MEDICATIONS                                            ------------------------------------------------------------  VITAL SIGNS: Last 24 Hours  T(C): 36.9 (21 Feb 2022 20:45), Max: 36.9 (21 Feb 2022 20:45)  T(F): 98.4 (21 Feb 2022 20:45), Max: 98.4 (21 Feb 2022 20:45)  HR: 81 (21 Feb 2022 20:45) (72 - 85)  BP: 184/89 (21 Feb 2022 20:45) (184/89 - 197/108)  BP(mean): 128 (21 Feb 2022 20:45) (128 - 147)  RR: 18 (21 Feb 2022 20:45) (17 - 18)  SpO2: --      02-21-22 @ 07:01  -  02-22-22 @ 07:00  --------------------------------------------------------  IN: 0 mL / OUT: 3200 mL / NET: -3200 mL                                             --------------------------------------------------------------  LABS:                        10.4   10.53 )-----------( 200      ( 22 Feb 2022 06:30 )             32.8     02-22    146  |  102  |  47<H>  ----------------------------<  153<H>  4.2   |  27  |  5.1<HH>    Ca    7.7<L>      22 Feb 2022 06:30  Phos  5.2     02-22  Mg     2.1     02-22    TPro  6.8  /  Alb  3.7  /  TBili  0.4  /  DBili  x   /  AST  24  /  ALT  31  /  AlkPhos  154<H>  02-22                  CARDIAC MARKERS ( 21 Feb 2022 09:07 )  x     / 0.19 ng/mL / x     / x     / x                                                  -------------------------------------------------------------  RADIOLOGY:                                            --------------------------------------------------------------    PHYSICAL EXAM:  General: Well appearing, not in acute distress  HEENT: No cervical LAD, or JVD  LUNGS: CTAB, no rales, on RA  HEART: RRR, no murmur  ABDOMEN: NBS, soft, nontender to palpation  EXT: mild peripheral pitting edema 1+ of lower extremities b/l  NEURO: AAOx3  SKIN: atrophic, xerotic lower extremities                                           --------------------------------------------------------------

## 2022-02-22 NOTE — DISCHARGE NOTE PROVIDER - NSDCMRMEDTOKEN_GEN_ALL_CORE_FT
aspirin 81 mg oral tablet: 1 tab(s) orally once a day  atorvastatin 40 mg oral tablet: 1 tab(s) orally once a day   furosemide 80 mg oral tablet: 1 tab(s) orally 2 times a day  Levemir 100 units/mL subcutaneous solution: 40 unit(s) subcutaneous once a day (at bedtime).   Metoprolol Tartrate 50 mg oral tablet: 1 tab(s) orally 2 times a day  Plavix 75 mg oral tablet: 1 tab(s) orally once a day  sevelamer carbonate 800 mg oral tablet: 1 tab(s) orally 3 times a day (with meals)  Trulicity Pen: 1 milligram(s) subcutaneous once a week   Acidophilus Extra Strength oral capsule: 1  orally once a day   aspirin 81 mg oral tablet: 1 tab(s) orally once a day  atorvastatin 40 mg oral tablet: 1 tab(s) orally once a day   furosemide 80 mg oral tablet: 1 tab(s) orally 2 times a day  Levemir 100 units/mL subcutaneous solution: 40 unit(s) subcutaneous once a day (at bedtime).   Metoprolol Tartrate 50 mg oral tablet: 1 tab(s) orally 2 times a day  metroNIDAZOLE 500 mg oral tablet: 1 tab(s) orally 3 times a day  NIFEdipine (Eqv-Adalat CC) 60 mg oral tablet, extended release: 1 tab(s) orally once a day  Plavix 75 mg oral tablet: 1 tab(s) orally once a day  Rocaltrol 0.25 mcg oral capsule: 1 cap(s) orally once a day  sevelamer carbonate 800 mg oral tablet: 1 tab(s) orally 3 times a day (with meals)  Trulicity Pen: 1 milligram(s) subcutaneous once a week

## 2022-02-22 NOTE — DISCHARGE NOTE PROVIDER - NSDCCPCAREPLAN_GEN_ALL_CORE_FT
PRINCIPAL DISCHARGE DIAGNOSIS  Diagnosis: BROWN (dyspnea on exertion)  Assessment and Plan of Treatment: You presented to the hospital from outpatient dialysis. You noted you were short of breath due to fluid overload. We adjusted your medications to bumex pills to help with removing fluid. You had 2 hemodialysis sessions during your stay. Your shortness of breath imrpoved. We adjusted your blood pressure medications by adding nifedpine to control your blood pressure better. Your blood pressure improved. Please follow up with your primary care physician and schedule an appointment to follow up with your nephrologist.      SECONDARY DISCHARGE DIAGNOSES  Diagnosis: Fluid overload  Assessment and Plan of Treatment:      PRINCIPAL DISCHARGE DIAGNOSIS  Diagnosis: BROWN (dyspnea on exertion)  Assessment and Plan of Treatment: You presented to the hospital from outpatient dialysis. You noted you were short of breath due to fluid overload. We adjusted your medications to bumex pills to help with removing fluid. You had 2 hemodialysis sessions during your stay. Your shortness of breath imrpoved. We adjusted your blood pressure medications by adding nifedpine to control your blood pressure better. Your blood pressure improved. Please follow up with your primary care physician and schedule an appointment to follow up with your nephrologist.      SECONDARY DISCHARGE DIAGNOSES  Diagnosis: Fluid overload  Assessment and Plan of Treatment: You were treated with Hemodialysis. You responded well. Please continue to watch your diet and maintain your HD sessions.    Diagnosis: Acute osteomyelitis  Assessment and Plan of Treatment: You had a diabetic foot ulcer. You had vascular imaging which revealed stenosis of arteries. This likely resulted in your ulcer. This ulcer turned into osteomyeltis involving the bone. You underwent a vascular procedure and podiatry procedure to open the vesels and to remove infected tissue. You should adhere to your diabetes medications. You need antibiotics which will be oral and IV. IV one will be given at Hemodialysis.

## 2022-02-23 LAB
ALBUMIN SERPL ELPH-MCNC: 3.7 G/DL — SIGNIFICANT CHANGE UP (ref 3.5–5.2)
ALP SERPL-CCNC: 152 U/L — HIGH (ref 30–115)
ALT FLD-CCNC: 24 U/L — SIGNIFICANT CHANGE UP (ref 0–41)
ANION GAP SERPL CALC-SCNC: 15 MMOL/L — HIGH (ref 7–14)
AST SERPL-CCNC: 19 U/L — SIGNIFICANT CHANGE UP (ref 0–41)
BILIRUB SERPL-MCNC: 0.3 MG/DL — SIGNIFICANT CHANGE UP (ref 0.2–1.2)
BUN SERPL-MCNC: 44 MG/DL — HIGH (ref 10–20)
CALCIUM SERPL-MCNC: 7.8 MG/DL — LOW (ref 8.5–10.1)
CHLORIDE SERPL-SCNC: 98 MMOL/L — SIGNIFICANT CHANGE UP (ref 98–110)
CO2 SERPL-SCNC: 25 MMOL/L — SIGNIFICANT CHANGE UP (ref 17–32)
CREAT SERPL-MCNC: 5.1 MG/DL — CRITICAL HIGH (ref 0.7–1.5)
GLUCOSE BLDC GLUCOMTR-MCNC: 193 MG/DL — HIGH (ref 70–99)
GLUCOSE BLDC GLUCOMTR-MCNC: 255 MG/DL — HIGH (ref 70–99)
GLUCOSE SERPL-MCNC: 151 MG/DL — HIGH (ref 70–99)
HCT VFR BLD CALC: 30.3 % — LOW (ref 42–52)
HGB BLD-MCNC: 9.6 G/DL — LOW (ref 14–18)
MAGNESIUM SERPL-MCNC: 2.1 MG/DL — SIGNIFICANT CHANGE UP (ref 1.8–2.4)
MCHC RBC-ENTMCNC: 30 PG — SIGNIFICANT CHANGE UP (ref 27–31)
MCHC RBC-ENTMCNC: 31.7 G/DL — LOW (ref 32–37)
MCV RBC AUTO: 94.7 FL — HIGH (ref 80–94)
NRBC # BLD: 0 /100 WBCS — SIGNIFICANT CHANGE UP (ref 0–0)
PLATELET # BLD AUTO: 199 K/UL — SIGNIFICANT CHANGE UP (ref 130–400)
POTASSIUM SERPL-MCNC: 4.1 MMOL/L — SIGNIFICANT CHANGE UP (ref 3.5–5)
POTASSIUM SERPL-SCNC: 4.1 MMOL/L — SIGNIFICANT CHANGE UP (ref 3.5–5)
PROT SERPL-MCNC: 6.7 G/DL — SIGNIFICANT CHANGE UP (ref 6–8)
RBC # BLD: 3.2 M/UL — LOW (ref 4.7–6.1)
RBC # FLD: 15.1 % — HIGH (ref 11.5–14.5)
SARS-COV-2 RNA SPEC QL NAA+PROBE: SIGNIFICANT CHANGE UP
SODIUM SERPL-SCNC: 138 MMOL/L — SIGNIFICANT CHANGE UP (ref 135–146)
WBC # BLD: 10.28 K/UL — SIGNIFICANT CHANGE UP (ref 4.8–10.8)
WBC # FLD AUTO: 10.28 K/UL — SIGNIFICANT CHANGE UP (ref 4.8–10.8)

## 2022-02-23 PROCEDURE — 99233 SBSQ HOSP IP/OBS HIGH 50: CPT

## 2022-02-23 RX ORDER — GLUCAGON INJECTION, SOLUTION 0.5 MG/.1ML
1 INJECTION, SOLUTION SUBCUTANEOUS ONCE
Refills: 0 | Status: DISCONTINUED | OUTPATIENT
Start: 2022-02-23 | End: 2022-02-28

## 2022-02-23 RX ORDER — DEXTROSE 50 % IN WATER 50 %
12.5 SYRINGE (ML) INTRAVENOUS ONCE
Refills: 0 | Status: DISCONTINUED | OUTPATIENT
Start: 2022-02-23 | End: 2022-02-28

## 2022-02-23 RX ORDER — SODIUM CHLORIDE 9 MG/ML
1000 INJECTION, SOLUTION INTRAVENOUS
Refills: 0 | Status: DISCONTINUED | OUTPATIENT
Start: 2022-02-23 | End: 2022-02-28

## 2022-02-23 RX ORDER — DEXTROSE 50 % IN WATER 50 %
15 SYRINGE (ML) INTRAVENOUS ONCE
Refills: 0 | Status: DISCONTINUED | OUTPATIENT
Start: 2022-02-23 | End: 2022-02-28

## 2022-02-23 RX ORDER — INSULIN LISPRO 100/ML
2 VIAL (ML) SUBCUTANEOUS ONCE
Refills: 0 | Status: COMPLETED | OUTPATIENT
Start: 2022-02-23 | End: 2022-02-23

## 2022-02-23 RX ORDER — DEXTROSE 50 % IN WATER 50 %
25 SYRINGE (ML) INTRAVENOUS ONCE
Refills: 0 | Status: DISCONTINUED | OUTPATIENT
Start: 2022-02-23 | End: 2022-02-28

## 2022-02-23 RX ORDER — PANTOPRAZOLE SODIUM 20 MG/1
40 TABLET, DELAYED RELEASE ORAL
Refills: 0 | Status: DISCONTINUED | OUTPATIENT
Start: 2022-02-23 | End: 2022-02-28

## 2022-02-23 RX ORDER — INSULIN LISPRO 100/ML
VIAL (ML) SUBCUTANEOUS
Refills: 0 | Status: DISCONTINUED | OUTPATIENT
Start: 2022-02-23 | End: 2022-02-28

## 2022-02-23 RX ADMIN — BUMETANIDE 2 MILLIGRAM(S): 0.25 INJECTION INTRAMUSCULAR; INTRAVENOUS at 20:39

## 2022-02-23 RX ADMIN — Medication 1: at 17:55

## 2022-02-23 RX ADMIN — Medication 2 UNIT(S): at 22:23

## 2022-02-23 RX ADMIN — HEPARIN SODIUM 5000 UNIT(S): 5000 INJECTION INTRAVENOUS; SUBCUTANEOUS at 05:45

## 2022-02-23 RX ADMIN — SEVELAMER CARBONATE 800 MILLIGRAM(S): 2400 POWDER, FOR SUSPENSION ORAL at 12:40

## 2022-02-23 RX ADMIN — HEPARIN SODIUM 5000 UNIT(S): 5000 INJECTION INTRAVENOUS; SUBCUTANEOUS at 17:56

## 2022-02-23 RX ADMIN — Medication 5 MILLIGRAM(S): at 21:21

## 2022-02-23 RX ADMIN — Medication 50 MILLIGRAM(S): at 05:45

## 2022-02-23 RX ADMIN — SEVELAMER CARBONATE 800 MILLIGRAM(S): 2400 POWDER, FOR SUSPENSION ORAL at 17:55

## 2022-02-23 RX ADMIN — Medication 50 MILLIGRAM(S): at 17:55

## 2022-02-23 RX ADMIN — Medication 81 MILLIGRAM(S): at 11:31

## 2022-02-23 RX ADMIN — SEVELAMER CARBONATE 800 MILLIGRAM(S): 2400 POWDER, FOR SUSPENSION ORAL at 09:29

## 2022-02-23 RX ADMIN — ATORVASTATIN CALCIUM 40 MILLIGRAM(S): 80 TABLET, FILM COATED ORAL at 21:21

## 2022-02-23 RX ADMIN — CHLORHEXIDINE GLUCONATE 1 APPLICATION(S): 213 SOLUTION TOPICAL at 05:46

## 2022-02-23 RX ADMIN — CLOPIDOGREL BISULFATE 75 MILLIGRAM(S): 75 TABLET, FILM COATED ORAL at 11:30

## 2022-02-23 RX ADMIN — Medication 60 MILLIGRAM(S): at 05:45

## 2022-02-23 NOTE — PROGRESS NOTE ADULT - SUBJECTIVE AND OBJECTIVE BOX
Podiatry Progress Note    Subjective:   SCHWABACHER, LAWRENCE is a pleasant well-nourished, well developed 61y Male in no acute distress, alert awake, and oriented to person, place and time.  Patient is a 61y old  Male who presents with a chief complaint of R heel wound.     PAST MEDICAL & SURGICAL HISTORY:  CAD (coronary artery disease)    S/P CABG (coronary artery bypass graft)  x4    Diabetes mellitus    Transient ischemic attack (TIA)  2017; 2008    Chronic kidney disease (CKD)  Stage IV    Hypertension    Stented coronary artery  in 2008    Myocardial infarction  2012    PAD (peripheral artery disease)  S/p bypass left leg    HLD (hyperlipidemia)    BPH (benign prostatic hyperplasia)    Pain in left knee  s/p fall    OA (osteoarthritis)    [place] (hard of hearing)    Chronic anemia    S/P CABG (coronary artery bypass graft)  2012    H/O arterial bypass of lower limb  Left Lower Extremity (2016)    History of surgery  Left CEA (2017)  Left Pinkie toe Amputation (2014)  CABG x 4 (2012)  Card cath - stent (2008)           Objective:  Vital Signs Last 24 Hrs  T(C): 35.8 (23 Feb 2022 05:39), Max: 36.4 (22 Feb 2022 20:58)  T(F): 96.5 (23 Feb 2022 05:39), Max: 97.6 (22 Feb 2022 20:58)  HR: 69 (23 Feb 2022 05:39) (69 - 74)  BP: 128/67 (23 Feb 2022 05:39) (128/67 - 157/87)  BP(mean): 96 (22 Feb 2022 20:58) (96 - 96)  RR: 17 (23 Feb 2022 05:39) (17 - 18)  SpO2: 96% (23 Feb 2022 05:39) (95% - 96%)                        9.6    10.28 )-----------( 199      ( 23 Feb 2022 04:30 )             30.3                 02-23    138  |  98  |  44<H>  ----------------------------<  151<H>  4.1   |  25  |  5.1<HH>    Ca    7.8<L>      23 Feb 2022 04:30  Phos  5.2     02-22  Mg     2.1     02-23    TPro  6.7  /  Alb  3.7  /  TBili  0.3  /  DBili  x   /  AST  19  /  ALT  24  /  AlkPhos  152<H>  02-23    -----------  ACC: [id number] EXAM: DUPLEX LOW ARTERIES COMP BILAT  PROCEDURE DATE: 02/22/2022  INTERPRETATION: Clinical History / Reason for exam: 61 years old male with diabetic ulcer and peripheral vascular disease for evaluation of arterial circulation.    Right:  Patent external iliac, common femoral, profunda, SFA, popliteal, posterior tibial, anterior tibial arteries. Moderate plaque burden in SFA in B-mode images.    Left:  Patent external iliac, common femoral, profunda, SFA, popliteal arteries. Minimal flow in the posterior tibial and peroneal arteries. The bypass is patent with no stenosis in the anastomosis or to bypass. Normal flow was detected in anterior tibial artery.    IMPRESSION:  Right-patent arteries and moderate plaque in SFA. Peroneal artery was not visualized.  Left-patent bypass with no evidence of stenosis is normal flow in distal anterior tibial artery. Minimal flow in negative posterior tibial and peroneal arteries.    --- End of Report ---      CEASAR GARY MD; Attending Vascular Surgeon  This document has been electronically signed. Feb 23 2022 9:37AM  ------------    ACC: [id number] EXAM: DUPLEX SCAN EXT VEINS LOWER BI  PROCEDURE DATE: 02/22/2022  INTERPRETATION: CLINICAL INFORMATION: The patient is a 61-year-old male with leg swelling. A venous duplex examination was performed to evaluate the patient for deep venous thrombosis of the lower extremities.    The common femoral, great saphenous, femoral, popliteal and small saphenous veins were visualized bilaterally with no evidence of deep venous thrombosis    All veins were fully compressible. There was presence of spontaneous flow, augmentation with distal compression and phasicity.    The anterior tibial veins were patent    The posterior tibial veins were patent    The peroneal veins were patent.    Impression:    No evidence of deep venous thrombosis or superficial thrombophlebitis in the bilateral lower extremities.    ICD-10:M79.89    --- End of Report ---    FAVIOLA GONZALEZ MD; Resident Radiologist  This document has been electronically signed.  CEASAR GARY MD; Attending Vascular Surgeon  This document has been electronically signed. Feb 23 2022 10:04AM    ----------      ACC: [id number] EXAM: XR FOOT COMP MIN 3 VIEWS RT  PROCEDURE DATE: 02/22/2022  INTERPRETATION: Clinical History / Reason for exam: Right heel ulcer    3 views of the right foot.    COMPARISON: None    Findings/impression:  There is posterior heel ulcer with no definite radiographic evidence of osteomyelitis. There is however mild adjacent cortical periosteal reaction which may be related to distal Achilles enthesophyte. Consider MRI of the ankle for further evaluation if clinically warranted.    No acute displaced fracture or dislocation. Vascular calcifications. Diffuse osteoarthritis of the forefoot, midfoot and hindfoot joints.    --- End of Report ---      SHILPA NAYLOR MD; Attending Radiologist  This document has been electronically signed. Feb 22 2022 5:41PM    -------------    Assessment:  Right Plantar Heel Ulcer - Probes to Soft Tissue    Plan:  Chart reviewed and Patient evaluated. All Questions and Concerns Addressed and Answered  Discussed diagnosis and treatment with patient  XR Imaging Right Foot; See Report Above;  Consider MRI of the ankle for further evaluation if clinically warranted. Request MRI-Ankle   Lower Extremity Venous Duplex B/L; See Report Above  Lower Extremity Arterial Duplex B/L; See Report Above; Left-patent bypass with no evidence of stenosis is normal flow in distal anterior tibial artery. Minimal flow in negative posterior tibial and peroneal arteries. Request Vascular Consult    Discussed Plan w/ Attending    Podiatry

## 2022-02-23 NOTE — PROGRESS NOTE ADULT - ASSESSMENT
ASSESSMENT & PLAN    60 yo M with PMHx of ESRD on HD M/W/F, Normocytic Anemia of Chronic Disease, DM Type II, Hypertension, DLD, TIA, CAD s/p staged PCI CABG 2012, PVD s/p left below knee popliteal to AT artery reverse saphenous vein graft in 4/2017 and balloon plasty of his left lower extremity bypass graft in 6/2020, and carotid stenosis s/p right carotid endarterectomy in 6/2017 sent in from o/p dialysis (prior to starting HD) for appearing pale. Admitted to inpatient for dyspnea 2/2 fluid overload, improved.     # ESRD on HD MWF, non-oliguric  # Fluid Overload; suspected due to diet/fluid intake+ incorrect use of home lasix; r/o cardiac cause  - due for HD today, did not miss any sessions as o/p;   - diet and fluid intake not ideal and seems meds were not being taken properly  - switched from lasix to PO bumex. nephro recs reviewed: continue bumex,continue nifedipine 60mg  - c/w sevelamer TID  - f/u AM BMP, Mg, phos  - BNP >70K  - TTE 5/2021 with EF 50-55% and grade 1 diastolic dysfunction; f/u repeat echo    #Elevated trops- 0.19: likely 2/2 CKD  - no active chest pain sx  - lower than previous levels- 0.25    #Right hell ulcer  - Podiatry recs reviewed. F/U [facility] Duplex Venous and Arterial    # Anemia chronic disease- stable. Hb 10.4  - On admission Hb 9.9, mildly decreased from baseline    # DM2  - takes lantus 40 units at night and trulicity weekly  - monitor FS and start insulin regimen if consistently >180    # HTN  - initially elevated in ED  - only on metoprolol 50mg BID at home  - monitor BP after HD    # DLD  - c/w statin    # CAD s/p staged PCI CABG 2012  # PVD s/p left below knee popliteal to AT artery reverse saphenous vein graft in 4/2017 and balloon plasty of his left lower extremity bypass graft in 6/2020 # Carotid stenosis s/p right carotid endarterectomy in 6/ 2017  - c/w asa 81mg, plavix, and BB      DVT ppx: heparin SC  GI ppx: NA  Diet: DASH, CC, renal, fluid restricted  Code Status: Full Code  Handoff: f/u [facility] duplex, BPs under control

## 2022-02-23 NOTE — PROGRESS NOTE ADULT - SUBJECTIVE AND OBJECTIVE BOX
LAWRENCE SCHWABACHER 61y Male  MRN#: [id number]   CODE STATUS: full code    Hospital Day: 2d    Pt is currently admitted with the primary diagnosis of fluid overload    SUBJECTIVE  Hospital Course    Overnight events: 24 hour events noted. Had additional dialysis session yesterday. Added nifedipine. BPs controlled well. Patient has no subjective complaints.     Subjective complaints                                               ----------------------------------------------------------  OBJECTIVE  PAST MEDICAL & SURGICAL HISTORY  CAD (coronary artery disease)    S/P CABG (coronary artery bypass graft)  x4    Diabetes mellitus    Transient ischemic attack (TIA)  2017; 2008    Chronic kidney disease (CKD)  Stage IV    Hypertension    Stented coronary artery  in 2008    Myocardial infarction  2012    PAD (peripheral artery disease)  S/p bypass left leg    HLD (hyperlipidemia)    BPH (benign prostatic hyperplasia)    Pain in left knee  s/p fall    OA (osteoarthritis)    [place] (hard of hearing)    Chronic anemia    S/P CABG (coronary artery bypass graft)  2012    H/O arterial bypass of lower limb  Left Lower Extremity (2016)    History of surgery  Left CEA (2017)  Left Pinkie toe Amputation (2014)  CABG x 4 (2012)  Card cath - stent (2008)                                                -----------------------------------------------------------  ALLERGIES:  No Known Allergies                                            ------------------------------------------------------------    HOME MEDICATIONS  Home Medications:  aspirin 81 mg oral tablet: 1 tab(s) orally once a day (21 Feb 2022 13:33)  furosemide 80 mg oral tablet: 1 tab(s) orally 2 times a day (21 Feb 2022 13:33)  Levemir 100 units/mL subcutaneous solution: 40 unit(s) subcutaneous once a day (at bedtime).  (21 Feb 2022 13:33)  Metoprolol Tartrate 50 mg oral tablet: 1 tab(s) orally 2 times a day (21 Feb 2022 13:33)  Plavix 75 mg oral tablet: 1 tab(s) orally once a day (21 Feb 2022 13:33)  Trulicity Pen: 1 milligram(s) subcutaneous once a week (21 Feb 2022 13:33)                           MEDICATIONS:  STANDING MEDICATIONS  aspirin enteric coated 81 milliGRAM(s) Oral daily  atorvastatin 40 milliGRAM(s) Oral at bedtime  buMETAnide 2 milliGRAM(s) Oral <User Schedule>  buMETAnide 2 milliGRAM(s) Oral <User Schedule>  chlorhexidine 4% Liquid 1 Application(s) Topical <User Schedule>  clopidogrel Tablet 75 milliGRAM(s) Oral daily  collagenase Ointment 1 Application(s) Topical daily  heparin   Injectable 5000 Unit(s) SubCutaneous every 12 hours  melatonin 5 milliGRAM(s) Oral at bedtime  metoprolol tartrate 50 milliGRAM(s) Oral two times a day  NIFEdipine XL 60 milliGRAM(s) Oral daily  sevelamer carbonate 800 milliGRAM(s) Oral three times a day with meals    PRN MEDICATIONS                                            ------------------------------------------------------------  VITAL SIGNS: Last 24 Hours  T(C): 35.8 (23 Feb 2022 05:39), Max: 36.4 (22 Feb 2022 20:58)  T(F): 96.5 (23 Feb 2022 05:39), Max: 97.6 (22 Feb 2022 20:58)  HR: 69 (23 Feb 2022 05:39) (69 - 74)  BP: 128/67 (23 Feb 2022 05:39) (128/67 - 157/87)  BP(mean): 96 (22 Feb 2022 20:58) (96 - 96)  RR: 17 (23 Feb 2022 05:39) (17 - 18)  SpO2: 96% (23 Feb 2022 05:39) (95% - 96%)      02-22-22 @ 07:01  -  02-23-22 @ 07:00  --------------------------------------------------------  IN: 0 mL / OUT: 2000 mL / NET: -2000 mL                                             --------------------------------------------------------------  LABS:                        10.4   10.53 )-----------( 200      ( 22 Feb 2022 06:30 )             32.8     02-22    146  |  102  |  47<H>  ----------------------------<  153<H>  4.2   |  27  |  5.1<HH>    Ca    7.7<L>      22 Feb 2022 06:30  Phos  5.2     02-22  Mg     2.1     02-22    TPro  6.8  /  Alb  3.7  /  TBili  0.4  /  DBili  x   /  AST  24  /  ALT  31  /  AlkPhos  154<H>  02-22          Troponin T, Serum: 0.20 ng/mL *HH* (02-22-22 @ 11:00)          CARDIAC MARKERS ( 22 Feb 2022 11:00 )  x     / 0.20 ng/mL / x     / x     / 5.0 ng/mL  CARDIAC MARKERS ( 21 Feb 2022 09:07 )  x     / 0.19 ng/mL / x     / x     / x                                                  -------------------------------------------------------------  RADIOLOGY:                                            --------------------------------------------------------------    PHYSICAL EXAM:  General: Well appearing, not in acute distress  HEENT: No cervical LAD, or JVD  LUNGS: CTAB, no rales, on RA  HEART: RRR, no murmur  ABDOMEN: NBS, soft, nontender to palpation  EXT: b/l LE mild pitting edema 1+  NEURO: AAOx3  SKIN: right wound bandaged                                            --------------------------------------------------------------

## 2022-02-23 NOTE — PROGRESS NOTE ADULT - ASSESSMENT
# ESRD on HD   # Hypervolemia  # HTN  # Normocytic anemia / CKD  # MBD    Recommendations:  -  s/p HD 2 days in a row / will attempt HD again today 2.5 h  opti 160 UF 2.5l if tolerates - will attempt to hold HD in AM   - pt is non-oliguric, continue bumex  -  trend trop/serial EKG/ cardiology evaluation   - phos level noted , on sevelamer  - BP noted keep current   - will follow

## 2022-02-23 NOTE — PROGRESS NOTE ADULT - SUBJECTIVE AND OBJECTIVE BOX
Nephrology progress note    THIS IS AN INCOMPLETE NOTE . FULL NOTE TO FOLLOW SHORTLY    Patient is seen and examined, events over the last 24 h noted .    Allergies:  No Known Allergies    Hospital Medications:   MEDICATIONS  (STANDING):  aspirin enteric coated 81 milliGRAM(s) Oral daily  atorvastatin 40 milliGRAM(s) Oral at bedtime  buMETAnide 2 milliGRAM(s) Oral <User Schedule>  buMETAnide 2 milliGRAM(s) Oral <User Schedule>  chlorhexidine 4% Liquid 1 Application(s) Topical <User Schedule>  clopidogrel Tablet 75 milliGRAM(s) Oral daily  collagenase Ointment 1 Application(s) Topical daily  heparin   Injectable 5000 Unit(s) SubCutaneous every 12 hours  melatonin 5 milliGRAM(s) Oral at bedtime  metoprolol tartrate 50 milliGRAM(s) Oral two times a day  NIFEdipine XL 60 milliGRAM(s) Oral daily  sevelamer carbonate 800 milliGRAM(s) Oral three times a day with meals        VITALS:  T(F): 96.5 (02-23-22 @ 05:39), Max: 97.6 (02-22-22 @ 20:58)  HR: 69 (02-23-22 @ 05:39)  BP: 128/67 (02-23-22 @ 05:39)  RR: 17 (02-23-22 @ 05:39)  SpO2: 96% (02-23-22 @ 05:39)  Wt(kg): --    02-21 @ 07:01  -  02-22 @ 07:00  --------------------------------------------------------  IN: 0 mL / OUT: 3200 mL / NET: -3200 mL    02-22 @ 07:01  -  02-23 @ 07:00  --------------------------------------------------------  IN: 0 mL / OUT: 2000 mL / NET: -2000 mL          PHYSICAL EXAM:  Constitutional: NAD  HEENT: anicteric sclera, oropharynx clear, MMM  Neck: No JVD  Respiratory: CTAB, no wheezes, rales or rhonchi  Cardiovascular: S1, S2, RRR  Gastrointestinal: BS+, soft, NT/ND  Extremities: No cyanosis or clubbing. No peripheral edema  :  No schneider.   Skin: No rashes    LABS:  02-22    146  |  102  |  47<H>  ----------------------------<  153<H>  4.2   |  27  |  5.1<HH>    Ca    7.7<L>      22 Feb 2022 06:30  Phos  5.2     02-22  Mg     2.1     02-22    TPro  6.8  /  Alb  3.7  /  TBili  0.4  /  DBili      /  AST  24  /  ALT  31  /  AlkPhos  154<H>  02-22                          9.6    10.28 )-----------( 199      ( 23 Feb 2022 04:30 )             30.3       Urine Studies:      RADIOLOGY & ADDITIONAL STUDIES:   Nephrology progress note  Patient is seen and examined, events over the last 24 h noted .  Lying in bed comfortable   Allergies:  No Known Allergies    Hospital Medications:   MEDICATIONS  (STANDING):  aspirin enteric coated 81 milliGRAM(s) Oral daily  atorvastatin 40 milliGRAM(s) Oral at bedtime  buMETAnide 2 milliGRAM(s) Oral <User Schedule>  buMETAnide 2 milliGRAM(s) Oral <User Schedule>  clopidogrel Tablet 75 milliGRAM(s) Oral daily  collagenase Ointment 1 Application(s) Topical daily  heparin   Injectable 5000 Unit(s) SubCutaneous every 12 hours  melatonin 5 milliGRAM(s) Oral at bedtime  metoprolol tartrate 50 milliGRAM(s) Oral two times a day  NIFEdipine XL 60 milliGRAM(s) Oral daily  sevelamer carbonate 800 milliGRAM(s) Oral three times a day with meals        VITALS:  T(F): 96.5 (02-23-22 @ 05:39), Max: 97.6 (02-22-22 @ 20:58)  HR: 69 (02-23-22 @ 05:39)  BP: 128/67 (02-23-22 @ 05:39)  RR: 17 (02-23-22 @ 05:39)  SpO2: 96% (02-23-22 @ 05:39)      02-21 @ 07:01  -  02-22 @ 07:00  --------------------------------------------------------  IN: 0 mL / OUT: 3200 mL / NET: -3200 mL    02-22 @ 07:01  -  02-23 @ 07:00  --------------------------------------------------------  IN: 0 mL / OUT: 2000 mL / NET: -2000 mL          PHYSICAL EXAM:  Constitutional: NAD  Neck: No JVD  Respiratory: CTAB  Cardiovascular: S1, S2, RRR  Gastrointestinal: BS+, soft, NT/ND  Extremities: No cyanosis or clubbing.plus one edema  :  No schneider.   Skin: No rashes    LABS:  02-22    146  |  102  |  47<H>  ----------------------------<  153<H>  4.2   |  27  |  5.1<HH>    Ca    7.7<L>      22 Feb 2022 06:30  Phos  5.2     02-22  Mg     2.1     02-22    TPro  6.8  /  Alb  3.7  /  TBili  0.4  /  DBili      /  AST  24  /  ALT  31  /  AlkPhos  154<H>  02-22                          9.6    10.28 )-----------( 199      ( 23 Feb 2022 04:30 )             30.3       Urine Studies:      RADIOLOGY & ADDITIONAL STUDIES:

## 2022-02-23 NOTE — PROGRESS NOTE ADULT - SUBJECTIVE AND OBJECTIVE BOX
Podiatry Progress Note    Subjective:   SCHWABACHER, LAWRENCE is a pleasant well-nourished, well developed 61y Male in no acute distress, alert awake, and oriented to person, place and time.  Patient is a 61y old  Male who presents with a chief complaint of Right heel wound. No new pedal complaints. Pt seen & evaluated at bedside w/Attending.    PAST MEDICAL & SURGICAL HISTORY:  CAD (coronary artery disease)    S/P CABG (coronary artery bypass graft)  x4    Diabetes mellitus    Transient ischemic attack (TIA)  2017; 2008    Chronic kidney disease (CKD)  Stage IV    Hypertension    Stented coronary artery  in 2008    Myocardial infarction  2012    PAD (peripheral artery disease)  S/p bypass left leg    HLD (hyperlipidemia)    BPH (benign prostatic hyperplasia)    Pain in left knee  s/p fall    OA (osteoarthritis)    [place] (hard of hearing)    Chronic anemia    S/P CABG (coronary artery bypass graft)  2012    H/O arterial bypass of lower limb  Left Lower Extremity (2016)    History of surgery  Left CEA (2017)  Left Pinkie toe Amputation (2014)  CABG x 4 (2012)  Card cath - stent (2008)           Objective:  Vital Signs Last 24 Hrs  T(C): 35.7 (23 Feb 2022 14:08), Max: 36.4 (22 Feb 2022 20:58)  T(F): 96.3 (23 Feb 2022 14:08), Max: 97.6 (22 Feb 2022 20:58)  HR: 67 (23 Feb 2022 16:00) (67 - 69)  BP: 120/76 (23 Feb 2022 16:00) (118/60 - 131/75)  BP(mean): 96 (22 Feb 2022 20:58) (96 - 96)  RR: 16 (23 Feb 2022 16:00) (16 - 18)  SpO2: 96% (23 Feb 2022 05:39) (95% - 96%)                        9.6    10.28 )-----------( 199      ( 23 Feb 2022 04:30 )             30.3                 02-23    138  |  98  |  44<H>  ----------------------------<  151<H>  4.1   |  25  |  5.1<HH>    Ca    7.8<L>      23 Feb 2022 04:30  Phos  5.2     02-22  Mg     2.1     02-23    TPro  6.7  /  Alb  3.7  /  TBili  0.3  /  DBili  x   /  AST  19  /  ALT  24  /  AlkPhos  152<H>  02-23    ------------      ACC: [id number] EXAM: XR FOOT COMP MIN 3 VIEWS RT  PROCEDURE DATE: 02/22/2022  INTERPRETATION: Clinical History / Reason for exam: Right heel ulcer    3 views of the right foot.    COMPARISON: None    Findings/  impression:    There is posterior heel ulcer with no definite radiographic evidence of osteomyelitis. There is however mild adjacent cortical periosteal reaction which may be related to distal Achilles enthesophyte. Consider MRI of the ankle for further evaluation if clinically warranted.    No acute displaced fracture or dislocation. Vascular calcifications. Diffuse osteoarthritis of the forefoot, midfoot and hindfoot joints.    --- End of Report ---      SHILPA NAYLOR MD; Attending Radiologist  This document has been electronically signed. Feb 22 2022 5:41PM    -----------    ACC: [id number] EXAM: DUPLEX SCAN EXT VEINS LOWER BI  PROCEDURE DATE: 02/22/2022  INTERPRETATION: CLINICAL INFORMATION: The patient is a 61-year-old male with leg swelling. A venous duplex examination was performed to evaluate the patient for deep venous thrombosis of the lower extremities.    The common femoral, great saphenous, femoral, popliteal and small saphenous veins were visualized bilaterally with no evidence of deep venous thrombosis    All veins were fully compressible. There was presence of spontaneous flow, augmentation with distal compression and phasicity.    The anterior tibial veins were patent    The posterior tibial veins were patent    The peroneal veins were patent.    Impression:    No evidence of deep venous thrombosis or superficial thrombophlebitis in the bilateral lower extremities.    ICD-10:M79.89    --- End of Report ---      FAVIOLA GONZALEZ MD; Resident Radiologist  This document has been electronically signed.  CEASAR GARY MD; Attending Vascular Surgeon  This document has been electronically signed. Feb 23 2022 10:04AM    -----------      ACC: [id number] EXAM: DUPLEX LOW ARTERIES COMP BILAT  PROCEDURE DATE: 02/22/2022  INTERPRETATION: Clinical History / Reason for exam: 61 years old male with diabetic ulcer and peripheral vascular disease for evaluation of arterial circulation.    Right:  Patent external iliac, common femoral, profunda, SFA, popliteal, posterior tibial, anterior tibial arteries. Moderate plaque burden in SFA in B-mode images.    Left:  Patent external iliac, common femoral, profunda, SFA, popliteal arteries. Minimal flow in the posterior tibial and peroneal arteries. The bypass is patent with no stenosis in the anastomosis or to bypass. Normal flow was detected in anterior tibial artery.    IMPRESSION:  Right-patent arteries and moderate plaque in SFA. Peroneal artery was not visualized.  Left-patent bypass with no evidence of stenosis is normal flow in distal anterior tibial artery. Minimal flow in negative posterior tibial and peroneal arteries.    --- End of Report ---      CEASAR GARY MD; Attending Vascular Surgeon  This document has been electronically signed. Feb 23 2022 9:37AM    ----------  Physical Exam - Lower Extremity Focused:   #Right Lower Extremity  Derm:   Open Right Plantar Heel Ulcer   Wound Probes to Soft Tissue Bone w/ Minimal Drainage; Wound Base Fibrotic. Wound margins well demarcated. Periwound: wound margins well demarcated. Minimal erythema.   Foot is warm, capillary refill is instant to the toes   Mild mottling of skin to foot & distal leg    Vascular: DP and PT Pulses Diminished; Foot is Warm to Warm to the touch   Neuro: Protective Sensation Diminished / Moderately Neuropathic   MSK: No Pain On Palpation at Wound Site     Assessment:  Right Plantar Heel Ulcer - Probes to Soft Tissue    Plan:  Chart reviewed and Patient evaluated. All Questions and Concerns Addressed and Answered  Discussed diagnosis and treatment with patient  Wound Flushed w/ NS; Wound Dressed w/ Santyl, Packed w/ Saline-Soaked Gauze / DSD / Kerlix   Local Wound Care; As Stated Above   ESR 15, CRP 16 (2/22/22)  XR Imaging Right Foot; See Report Above: Consider MRI of the ankle for further evaluation if clinically warranted.   Request MRI-Ankle   Lower Extremity Venous Duplex B/L; See Report Above  Lower Extremity Arterial Duplex B/L; See Report Above: Left-patent bypass with no evidence of stenosis is normal flow in distal anterior tibial artery. Minimal flow in negative posterior tibial and peroneal arteries. Request Vascular Consult  Discussed Plan w/ Attending    Podiatry          Podiatry Progress Note    Subjective:   SCHWABACHER, LAWRENCE is a pleasant well-nourished, well developed 61y Male in no acute distress, alert awake, and oriented to person, place and time.  Patient is a 61y old  Male who presents with a chief complaint of Right heel wound. No new pedal complaints. Pt seen & evaluated at bedside w/Attending.    PAST MEDICAL & SURGICAL HISTORY:  CAD (coronary artery disease)    S/P CABG (coronary artery bypass graft)  x4    Diabetes mellitus    Transient ischemic attack (TIA)  2017; 2008    Chronic kidney disease (CKD)  Stage IV    Hypertension    Stented coronary artery  in 2008    Myocardial infarction  2012    PAD (peripheral artery disease)  S/p bypass left leg    HLD (hyperlipidemia)    BPH (benign prostatic hyperplasia)    Pain in left knee  s/p fall    OA (osteoarthritis)    [place] (hard of hearing)    Chronic anemia    S/P CABG (coronary artery bypass graft)  2012    H/O arterial bypass of lower limb  Left Lower Extremity (2016)    History of surgery  Left CEA (2017)  Left Pinkie toe Amputation (2014)  CABG x 4 (2012)  Card cath - stent (2008)           Objective:  Vital Signs Last 24 Hrs  T(C): 35.7 (23 Feb 2022 14:08), Max: 36.4 (22 Feb 2022 20:58)  T(F): 96.3 (23 Feb 2022 14:08), Max: 97.6 (22 Feb 2022 20:58)  HR: 67 (23 Feb 2022 16:00) (67 - 69)  BP: 120/76 (23 Feb 2022 16:00) (118/60 - 131/75)  BP(mean): 96 (22 Feb 2022 20:58) (96 - 96)  RR: 16 (23 Feb 2022 16:00) (16 - 18)  SpO2: 96% (23 Feb 2022 05:39) (95% - 96%)                        9.6    10.28 )-----------( 199      ( 23 Feb 2022 04:30 )             30.3                 02-23    138  |  98  |  44<H>  ----------------------------<  151<H>  4.1   |  25  |  5.1<HH>    Ca    7.8<L>      23 Feb 2022 04:30  Phos  5.2     02-22  Mg     2.1     02-23    TPro  6.7  /  Alb  3.7  /  TBili  0.3  /  DBili  x   /  AST  19  /  ALT  24  /  AlkPhos  152<H>  02-23    ------------      ACC: [id number] EXAM: XR FOOT COMP MIN 3 VIEWS RT  PROCEDURE DATE: 02/22/2022  INTERPRETATION: Clinical History / Reason for exam: Right heel ulcer    3 views of the right foot.    COMPARISON: None    Findings/  impression:    There is posterior heel ulcer with no definite radiographic evidence of osteomyelitis. There is however mild adjacent cortical periosteal reaction which may be related to distal Achilles enthesophyte. Consider MRI of the ankle for further evaluation if clinically warranted.    No acute displaced fracture or dislocation. Vascular calcifications. Diffuse osteoarthritis of the forefoot, midfoot and hindfoot joints.    --- End of Report ---      SHILPA NAYLOR MD; Attending Radiologist  This document has been electronically signed. Feb 22 2022 5:41PM    -----------    ACC: [id number] EXAM: DUPLEX SCAN EXT VEINS LOWER BI  PROCEDURE DATE: 02/22/2022  INTERPRETATION: CLINICAL INFORMATION: The patient is a 61-year-old male with leg swelling. A venous duplex examination was performed to evaluate the patient for deep venous thrombosis of the lower extremities.    The common femoral, great saphenous, femoral, popliteal and small saphenous veins were visualized bilaterally with no evidence of deep venous thrombosis    All veins were fully compressible. There was presence of spontaneous flow, augmentation with distal compression and phasicity.    The anterior tibial veins were patent    The posterior tibial veins were patent    The peroneal veins were patent.    Impression:    No evidence of deep venous thrombosis or superficial thrombophlebitis in the bilateral lower extremities.    ICD-10:M79.89    --- End of Report ---      FAVIOLA GONZALEZ MD; Resident Radiologist  This document has been electronically signed.  CEASAR GARY MD; Attending Vascular Surgeon  This document has been electronically signed. Feb 23 2022 10:04AM    -----------      ACC: [id number] EXAM: DUPLEX LOW ARTERIES COMP BILAT  PROCEDURE DATE: 02/22/2022  INTERPRETATION: Clinical History / Reason for exam: 61 years old male with diabetic ulcer and peripheral vascular disease for evaluation of arterial circulation.    Right:  Patent external iliac, common femoral, profunda, SFA, popliteal, posterior tibial, anterior tibial arteries. Moderate plaque burden in SFA in B-mode images.    Left:  Patent external iliac, common femoral, profunda, SFA, popliteal arteries. Minimal flow in the posterior tibial and peroneal arteries. The bypass is patent with no stenosis in the anastomosis or to bypass. Normal flow was detected in anterior tibial artery.    IMPRESSION:  Right-patent arteries and moderate plaque in SFA. Peroneal artery was not visualized.  Left-patent bypass with no evidence of stenosis is normal flow in distal anterior tibial artery. Minimal flow in negative posterior tibial and peroneal arteries.    --- End of Report ---      CEASAR GARY MD; Attending Vascular Surgeon  This document has been electronically signed. Feb 23 2022 9:37AM    ----------  Physical Exam - Lower Extremity Focused:   #Right Lower Extremity  Derm:   Open Right Plantar Heel Ulcer   Wound Probes to Soft Tissue Bone w/ Minimal Drainage; Wound Base Fibrotic. Wound margins well demarcated. Periwound: wound margins well demarcated. Minimal erythema.   Foot is warm, capillary refill is instant to the toes   Mild mottling of skin to foot & distal leg    Vascular: DP and PT Pulses Diminished; Foot is Warm to Warm to the touch   Neuro: Protective Sensation Diminished / Moderately Neuropathic   MSK: No Pain On Palpation at Wound Site     Assessment:  Right Plantar Heel Ulcer - Probes to Soft Tissue    Plan:  Chart reviewed and Patient evaluated. All Questions and Concerns Addressed and Answered  Discussed diagnosis and treatment with patient  Wound Flushed w/ NS; Wound Dressed w/ Santyl, Packed w/ Saline-Soaked Gauze / DSD / Kerlix   Local Wound Care; As Stated Above   ESR 15, CRP 16 (2/22/22)  XR Imaging Right Foot; See Report Above: Consider MRI of the ankle for further evaluation if clinically warranted.   Request MRI-Right Ankle   Lower Extremity Venous Duplex B/L; See Report Above  Lower Extremity Arterial Duplex B/L; See Report Above: Left-patent bypass with no evidence of stenosis is normal flow in distal anterior tibial artery. Minimal flow in negative posterior tibial and peroneal arteries. Request Vascular Consult  Discussed Plan w/ Attending    Podiatry

## 2022-02-24 ENCOUNTER — TRANSCRIPTION ENCOUNTER (OUTPATIENT)
Age: 62
End: 2022-02-24

## 2022-02-24 LAB
A1C WITH ESTIMATED AVERAGE GLUCOSE RESULT: 7.2 % — HIGH (ref 4–5.6)
ALBUMIN SERPL ELPH-MCNC: 3.7 G/DL — SIGNIFICANT CHANGE UP (ref 3.5–5.2)
ALP SERPL-CCNC: 158 U/L — HIGH (ref 30–115)
ALT FLD-CCNC: 20 U/L — SIGNIFICANT CHANGE UP (ref 0–41)
ANION GAP SERPL CALC-SCNC: 15 MMOL/L — HIGH (ref 7–14)
AST SERPL-CCNC: 16 U/L — SIGNIFICANT CHANGE UP (ref 0–41)
BILIRUB SERPL-MCNC: 0.3 MG/DL — SIGNIFICANT CHANGE UP (ref 0.2–1.2)
BUN SERPL-MCNC: 44 MG/DL — HIGH (ref 10–20)
CALCIUM SERPL-MCNC: 7.6 MG/DL — LOW (ref 8.5–10.1)
CHLORIDE SERPL-SCNC: 96 MMOL/L — LOW (ref 98–110)
CO2 SERPL-SCNC: 24 MMOL/L — SIGNIFICANT CHANGE UP (ref 17–32)
CREAT SERPL-MCNC: 5.5 MG/DL — CRITICAL HIGH (ref 0.7–1.5)
ESTIMATED AVERAGE GLUCOSE: 160 MG/DL — HIGH (ref 68–114)
GLUCOSE BLDC GLUCOMTR-MCNC: 142 MG/DL — HIGH (ref 70–99)
GLUCOSE BLDC GLUCOMTR-MCNC: 155 MG/DL — HIGH (ref 70–99)
GLUCOSE BLDC GLUCOMTR-MCNC: 215 MG/DL — HIGH (ref 70–99)
GLUCOSE BLDC GLUCOMTR-MCNC: 283 MG/DL — HIGH (ref 70–99)
GLUCOSE SERPL-MCNC: 134 MG/DL — HIGH (ref 70–99)
HCT VFR BLD CALC: 32.3 % — LOW (ref 42–52)
HGB BLD-MCNC: 10.1 G/DL — LOW (ref 14–18)
MAGNESIUM SERPL-MCNC: 2 MG/DL — SIGNIFICANT CHANGE UP (ref 1.8–2.4)
MCHC RBC-ENTMCNC: 29.9 PG — SIGNIFICANT CHANGE UP (ref 27–31)
MCHC RBC-ENTMCNC: 31.3 G/DL — LOW (ref 32–37)
MCV RBC AUTO: 95.6 FL — HIGH (ref 80–94)
NRBC # BLD: 0 /100 WBCS — SIGNIFICANT CHANGE UP (ref 0–0)
PLATELET # BLD AUTO: 184 K/UL — SIGNIFICANT CHANGE UP (ref 130–400)
POTASSIUM SERPL-MCNC: 4.4 MMOL/L — SIGNIFICANT CHANGE UP (ref 3.5–5)
POTASSIUM SERPL-SCNC: 4.4 MMOL/L — SIGNIFICANT CHANGE UP (ref 3.5–5)
PROT SERPL-MCNC: 6.8 G/DL — SIGNIFICANT CHANGE UP (ref 6–8)
RBC # BLD: 3.38 M/UL — LOW (ref 4.7–6.1)
RBC # FLD: 14.9 % — HIGH (ref 11.5–14.5)
SODIUM SERPL-SCNC: 135 MMOL/L — SIGNIFICANT CHANGE UP (ref 135–146)
WBC # BLD: 10.07 K/UL — SIGNIFICANT CHANGE UP (ref 4.8–10.8)
WBC # FLD AUTO: 10.07 K/UL — SIGNIFICANT CHANGE UP (ref 4.8–10.8)

## 2022-02-24 PROCEDURE — 73721 MRI JNT OF LWR EXTRE W/O DYE: CPT | Mod: 26,RT

## 2022-02-24 PROCEDURE — 93306 TTE W/DOPPLER COMPLETE: CPT | Mod: 26

## 2022-02-24 PROCEDURE — 99221 1ST HOSP IP/OBS SF/LOW 40: CPT

## 2022-02-24 PROCEDURE — 99233 SBSQ HOSP IP/OBS HIGH 50: CPT

## 2022-02-24 RX ORDER — INSULIN LISPRO 100/ML
3 VIAL (ML) SUBCUTANEOUS
Refills: 0 | Status: DISCONTINUED | OUTPATIENT
Start: 2022-02-24 | End: 2022-02-28

## 2022-02-24 RX ADMIN — SEVELAMER CARBONATE 800 MILLIGRAM(S): 2400 POWDER, FOR SUSPENSION ORAL at 08:34

## 2022-02-24 RX ADMIN — HEPARIN SODIUM 5000 UNIT(S): 5000 INJECTION INTRAVENOUS; SUBCUTANEOUS at 05:30

## 2022-02-24 RX ADMIN — ATORVASTATIN CALCIUM 40 MILLIGRAM(S): 80 TABLET, FILM COATED ORAL at 21:57

## 2022-02-24 RX ADMIN — Medication 50 MILLIGRAM(S): at 17:28

## 2022-02-24 RX ADMIN — Medication 50 MILLIGRAM(S): at 05:30

## 2022-02-24 RX ADMIN — Medication 60 MILLIGRAM(S): at 05:30

## 2022-02-24 RX ADMIN — BUMETANIDE 2 MILLIGRAM(S): 0.25 INJECTION INTRAMUSCULAR; INTRAVENOUS at 17:29

## 2022-02-24 RX ADMIN — Medication 1: at 17:26

## 2022-02-24 RX ADMIN — SEVELAMER CARBONATE 800 MILLIGRAM(S): 2400 POWDER, FOR SUSPENSION ORAL at 17:28

## 2022-02-24 RX ADMIN — Medication 1 APPLICATION(S): at 11:41

## 2022-02-24 RX ADMIN — CLOPIDOGREL BISULFATE 75 MILLIGRAM(S): 75 TABLET, FILM COATED ORAL at 11:36

## 2022-02-24 RX ADMIN — PANTOPRAZOLE SODIUM 40 MILLIGRAM(S): 20 TABLET, DELAYED RELEASE ORAL at 05:30

## 2022-02-24 RX ADMIN — BUMETANIDE 2 MILLIGRAM(S): 0.25 INJECTION INTRAMUSCULAR; INTRAVENOUS at 05:30

## 2022-02-24 RX ADMIN — Medication 3: at 11:37

## 2022-02-24 RX ADMIN — Medication 81 MILLIGRAM(S): at 11:36

## 2022-02-24 RX ADMIN — SEVELAMER CARBONATE 800 MILLIGRAM(S): 2400 POWDER, FOR SUSPENSION ORAL at 11:36

## 2022-02-24 RX ADMIN — Medication 3 UNIT(S): at 17:27

## 2022-02-24 RX ADMIN — HEPARIN SODIUM 5000 UNIT(S): 5000 INJECTION INTRAVENOUS; SUBCUTANEOUS at 17:29

## 2022-02-24 RX ADMIN — Medication 5 MILLIGRAM(S): at 21:57

## 2022-02-24 NOTE — PROGRESS NOTE ADULT - ASSESSMENT
# ESRD on HD   # Hypervolemia  # HTN  # Normocytic anemia / CKD  # MBD    Recommendations:  -  sp HD yesterday/ next ttt in AM   - pt is non-oliguric, continue bumex  -  trend trop/serial EKG/ cardiology evaluation   - phos level noted , on sevelamer  - BP noted keep current   - MRI lower ext today   - will follow

## 2022-02-24 NOTE — PROGRESS NOTE ADULT - SUBJECTIVE AND OBJECTIVE BOX
Nephrology progress note    THIS IS AN INCOMPLETE NOTE . FULL NOTE TO FOLLOW SHORTLY    Patient is seen and examined, events over the last 24 h noted .    Allergies:  No Known Allergies    Hospital Medications:   MEDICATIONS  (STANDING):  aspirin enteric coated 81 milliGRAM(s) Oral daily  atorvastatin 40 milliGRAM(s) Oral at bedtime  buMETAnide 2 milliGRAM(s) Oral <User Schedule>  buMETAnide 2 milliGRAM(s) Oral <User Schedule>  chlorhexidine 4% Liquid 1 Application(s) Topical <User Schedule>  clopidogrel Tablet 75 milliGRAM(s) Oral daily  collagenase Ointment 1 Application(s) Topical daily  dextrose 40% Gel 15 Gram(s) Oral once  dextrose 5%. 1000 milliLiter(s) (50 mL/Hr) IV Continuous <Continuous>  dextrose 5%. 1000 milliLiter(s) (100 mL/Hr) IV Continuous <Continuous>  dextrose 50% Injectable 25 Gram(s) IV Push once  dextrose 50% Injectable 12.5 Gram(s) IV Push once  dextrose 50% Injectable 25 Gram(s) IV Push once  glucagon  Injectable 1 milliGRAM(s) IntraMuscular once  heparin   Injectable 5000 Unit(s) SubCutaneous every 12 hours  insulin lispro (ADMELOG) corrective regimen sliding scale   SubCutaneous three times a day before meals  melatonin 5 milliGRAM(s) Oral at bedtime  metoprolol tartrate 50 milliGRAM(s) Oral two times a day  NIFEdipine XL 60 milliGRAM(s) Oral daily  pantoprazole    Tablet 40 milliGRAM(s) Oral before breakfast  sevelamer carbonate 800 milliGRAM(s) Oral three times a day with meals        VITALS:  T(F): 96.5 (02-24-22 @ 04:42), Max: 96.5 (02-24-22 @ 04:42)  HR: 82 (02-24-22 @ 05:36)  BP: 143/71 (02-24-22 @ 05:36)  RR: 18 (02-24-22 @ 04:42)  SpO2: 97% (02-24-22 @ 05:36)  Wt(kg): --    02-22 @ 07:01  -  02-23 @ 07:00  --------------------------------------------------------  IN: 0 mL / OUT: 2000 mL / NET: -2000 mL    02-23 @ 07:01  -  02-24 @ 07:00  --------------------------------------------------------  IN: 0 mL / OUT: 2500 mL / NET: -2500 mL          PHYSICAL EXAM:  Constitutional: NAD  HEENT: anicteric sclera, oropharynx clear, MMM  Neck: No JVD  Respiratory: CTAB, no wheezes, rales or rhonchi  Cardiovascular: S1, S2, RRR  Gastrointestinal: BS+, soft, NT/ND  Extremities: No cyanosis or clubbing. No peripheral edema  :  No schneider.   Skin: No rashes    LABS:  02-24    135  |  96<L>  |  44<H>  ----------------------------<  134<H>  4.4   |  24  |  5.5<HH>    Ca    7.6<L>      24 Feb 2022 06:00  Mg     2.0     02-24    TPro  6.8  /  Alb  3.7  /  TBili  0.3  /  DBili      /  AST  16  /  ALT  20  /  AlkPhos  158<H>  02-24                          10.1   10.07 )-----------( 184      ( 24 Feb 2022 06:00 )             32.3       Urine Studies:      RADIOLOGY & ADDITIONAL STUDIES:   Nephrology progress note    Patient is seen and examined, events over the last 24 h noted .  feels better  awaiting imaging lower ext     Allergies:  No Known Allergies    Hospital Medications:   MEDICATIONS  (STANDING):  aspirin enteric coated 81 milliGRAM(s) Oral daily  atorvastatin 40 milliGRAM(s) Oral at bedtime  buMETAnide 2 milliGRAM(s) Oral <User Schedule>  buMETAnide 2 milliGRAM(s) Oral <User Schedule>  chlorhexidine 4% Liquid 1 Application(s) Topical <User Schedule>  clopidogrel Tablet 75 milliGRAM(s) Oral daily  collagenase Ointment 1 Application(s) Topical daily  glucagon  Injectable 1 milliGRAM(s) IntraMuscular once  heparin   Injectable 5000 Unit(s) SubCutaneous every 12 hours  insulin lispro (ADMELOG) corrective regimen sliding scale   SubCutaneous three times a day before meals  melatonin 5 milliGRAM(s) Oral at bedtime  metoprolol tartrate 50 milliGRAM(s) Oral two times a day  NIFEdipine XL 60 milliGRAM(s) Oral daily  pantoprazole    Tablet 40 milliGRAM(s) Oral before breakfast  sevelamer carbonate 800 milliGRAM(s) Oral three times a day with meals        VITALS:  T(F): 96.5 (02-24-22 @ 04:42), Max: 96.5 (02-24-22 @ 04:42)  HR: 82 (02-24-22 @ 05:36)  BP: 143/71 (02-24-22 @ 05:36)  RR: 18 (02-24-22 @ 04:42)  SpO2: 97% (02-24-22 @ 05:36)      02-22 @ 07:01  -  02-23 @ 07:00  --------------------------------------------------------  IN: 0 mL / OUT: 2000 mL / NET: -2000 mL    02-23 @ 07:01  -  02-24 @ 07:00  --------------------------------------------------------  IN: 0 mL / OUT: 2500 mL / NET: -2500 mL          PHYSICAL EXAM:  Constitutional: NAD  Neck: No JVD  Respiratory: CTAB, no wheezes, rales or rhonchi  Cardiovascular: S1, S2, RRR  Gastrointestinal: BS+, soft, NT/ND  Extremities: No cyanosis or clubbing. No peripheral edema/ dressing lower ext   :  No schneider.   Skin: No rashes    LABS:  02-24    135  |  96<L>  |  44<H>  ----------------------------<  134<H>  4.4   |  24  |  5.5<HH>    Ca    7.6<L>      24 Feb 2022 06:00  Mg     2.0     02-24    TPro  6.8  /  Alb  3.7  /  TBili  0.3  /  DBili      /  AST  16  /  ALT  20  /  AlkPhos  158<H>  02-24                          10.1   10.07 )-----------( 184      ( 24 Feb 2022 06:00 )             32.3       Urine Studies:      RADIOLOGY & ADDITIONAL STUDIES:

## 2022-02-24 NOTE — PROGRESS NOTE ADULT - SUBJECTIVE AND OBJECTIVE BOX
Podiatry Progress Note    Subjective:   SCHWABACHER, LAWRENCE is a pleasant well-nourished, well developed 61y Male in no acute distress, alert awake, and oriented to person, place and time.  Patient is a 61y old  Male who presents with a chief complaint of R heel wound. Pt seen & evaluated at bedside. No new pedal complaints.    PAST MEDICAL & SURGICAL HISTORY:  CAD (coronary artery disease)    S/P CABG (coronary artery bypass graft)  x4    Diabetes mellitus    Transient ischemic attack (TIA)  2017; 2008    Chronic kidney disease (CKD)  Stage IV    Hypertension    Stented coronary artery  in 2008    Myocardial infarction  2012    PAD (peripheral artery disease)  S/p bypass left leg    HLD (hyperlipidemia)    BPH (benign prostatic hyperplasia)    Pain in left knee  s/p fall    OA (osteoarthritis)    [place] (hard of hearing)    Chronic anemia    S/P CABG (coronary artery bypass graft)  2012    H/O arterial bypass of lower limb  Left Lower Extremity (2016)    History of surgery  Left CEA (2017)  Left Pinkie toe Amputation (2014)  CABG x 4 (2012)  Card cath - stent (2008)           Objective:  Vital Signs Last 24 Hrs  T(C): 35.8 (24 Feb 2022 04:42), Max: 35.8 (24 Feb 2022 04:42)  T(F): 96.5 (24 Feb 2022 04:42), Max: 96.5 (24 Feb 2022 04:42)  HR: 82 (24 Feb 2022 05:36) (67 - 82)  BP: 143/71 (24 Feb 2022 05:36) (118/60 - 143/71)  BP(mean): --  RR: 18 (24 Feb 2022 04:42) (16 - 18)  SpO2: 97% (24 Feb 2022 05:36) (97% - 97%)                        10.1   10.07 )-----------( 184      ( 24 Feb 2022 06:00 )             32.3                 02-24    135  |  96<L>  |  44<H>  ----------------------------<  134<H>  4.4   |  24  |  5.5<HH>    Ca    7.6<L>      24 Feb 2022 06:00  Mg     2.0     02-24    TPro  6.8  /  Alb  3.7  /  TBili  0.3  /  DBili  x   /  AST  16  /  ALT  20  /  AlkPhos  158<H>  02-24          Physical Exam - Lower Extremity Focused:   #Right Lower Extremity  Derm:   Open Right Plantar Heel Ulcer   Wound Probes to Soft Tissue Bone w/ Minimal Drainage; Wound Base Fibrotic. Wound margins well demarcated. Periwound: wound margins well demarcated. Minimal erythema.   Foot is warm, capillary refill is instant to the toes   Mild mottling of skin to foot & distal leg    Vascular: DP and PT Pulses Diminished; Foot is Warm to Warm to the touch   Neuro: Protective Sensation Diminished / Moderately Neuropathic   MSK: No Pain On Palpation at Wound Site     Assessment:  Right Plantar Heel Ulcer - Probes to Soft Tissue    Plan:  Chart reviewed and Patient evaluated. All Questions and Concerns Addressed and Answered  Discussed diagnosis and treatment with patient  Wound Flushed w/ NS; Wound Dressed w/ Santyl, Packed w/ Saline-Soaked Gauze / DSD / Kerlix   Local Wound Care; As Stated Above   ESR 15, CRP 16 (2/22/22)  XR Imaging Right Foot; See Report Above: Consider MRI of the ankle for further evaluation if clinically warranted.   Request MRI-Right Ankle; Ordered, not performed; will f/u  Lower Extremity Arterial Duplex B/L; See Report Above: Left-patent bypass with no evidence of stenosis is normal flow in distal anterior tibial artery. Minimal flow in negative posterior tibial and peroneal arteries. Request Vascular Consult; consult is active, will f/u  Discussed Plan w/ Attending    Podiatry

## 2022-02-24 NOTE — PROGRESS NOTE ADULT - SUBJECTIVE AND OBJECTIVE BOX
LAWRENCE SCHWABACHER 61y Male  MRN#: [id number]   CODE STATUS: full code    Hospital Day: 3d    Pt is currently admitted with the primary diagnosis of fluid overload    SUBJECTIVE  Hospital Course    Overnight events: 24 hour events reviewed. Received HD yesterday. Podiatry recs reviewed. Patient has no subjective complaints. Wondering when he will go home.     Subjective complaints                                                  ----------------------------------------------------------  OBJECTIVE  PAST MEDICAL & SURGICAL HISTORY  CAD (coronary artery disease)    S/P CABG (coronary artery bypass graft)  x4    Diabetes mellitus    Transient ischemic attack (TIA)  2017; 2008    Chronic kidney disease (CKD)  Stage IV    Hypertension    Stented coronary artery  in 2008    Myocardial infarction  2012    PAD (peripheral artery disease)  S/p bypass left leg    HLD (hyperlipidemia)    BPH (benign prostatic hyperplasia)    Pain in left knee  s/p fall    OA (osteoarthritis)    [place] (hard of hearing)    Chronic anemia    S/P CABG (coronary artery bypass graft)  2012    H/O arterial bypass of lower limb  Left Lower Extremity (2016)    History of surgery  Left CEA (2017)  Left Pinkie toe Amputation (2014)  CABG x 4 (2012)  Card cath - stent (2008)                                                -----------------------------------------------------------  ALLERGIES:  No Known Allergies                                            ------------------------------------------------------------    HOME MEDICATIONS  Home Medications:  aspirin 81 mg oral tablet: 1 tab(s) orally once a day (21 Feb 2022 13:33)  furosemide 80 mg oral tablet: 1 tab(s) orally 2 times a day (21 Feb 2022 13:33)  Levemir 100 units/mL subcutaneous solution: 40 unit(s) subcutaneous once a day (at bedtime).  (21 Feb 2022 13:33)  Metoprolol Tartrate 50 mg oral tablet: 1 tab(s) orally 2 times a day (21 Feb 2022 13:33)  Plavix 75 mg oral tablet: 1 tab(s) orally once a day (21 Feb 2022 13:33)  Trulicity Pen: 1 milligram(s) subcutaneous once a week (21 Feb 2022 13:33)                           MEDICATIONS:  STANDING MEDICATIONS  aspirin enteric coated 81 milliGRAM(s) Oral daily  atorvastatin 40 milliGRAM(s) Oral at bedtime  buMETAnide 2 milliGRAM(s) Oral <User Schedule>  buMETAnide 2 milliGRAM(s) Oral <User Schedule>  chlorhexidine 4% Liquid 1 Application(s) Topical <User Schedule>  clopidogrel Tablet 75 milliGRAM(s) Oral daily  collagenase Ointment 1 Application(s) Topical daily  dextrose 40% Gel 15 Gram(s) Oral once  dextrose 5%. 1000 milliLiter(s) IV Continuous <Continuous>  dextrose 5%. 1000 milliLiter(s) IV Continuous <Continuous>  dextrose 50% Injectable 25 Gram(s) IV Push once  dextrose 50% Injectable 12.5 Gram(s) IV Push once  dextrose 50% Injectable 25 Gram(s) IV Push once  glucagon  Injectable 1 milliGRAM(s) IntraMuscular once  heparin   Injectable 5000 Unit(s) SubCutaneous every 12 hours  insulin lispro (ADMELOG) corrective regimen sliding scale   SubCutaneous three times a day before meals  melatonin 5 milliGRAM(s) Oral at bedtime  metoprolol tartrate 50 milliGRAM(s) Oral two times a day  NIFEdipine XL 60 milliGRAM(s) Oral daily  pantoprazole    Tablet 40 milliGRAM(s) Oral before breakfast  sevelamer carbonate 800 milliGRAM(s) Oral three times a day with meals    PRN MEDICATIONS                                            ------------------------------------------------------------  VITAL SIGNS: Last 24 Hours  T(C): 35.8 (24 Feb 2022 04:42), Max: 35.8 (24 Feb 2022 04:42)  T(F): 96.5 (24 Feb 2022 04:42), Max: 96.5 (24 Feb 2022 04:42)  HR: 82 (24 Feb 2022 05:36) (67 - 82)  BP: 143/71 (24 Feb 2022 05:36) (118/60 - 143/71)  BP(mean): --  RR: 18 (24 Feb 2022 04:42) (16 - 18)  SpO2: 97% (24 Feb 2022 05:36) (97% - 97%)      02-23-22 @ 07:01  -  02-24-22 @ 07:00  --------------------------------------------------------  IN: 0 mL / OUT: 2500 mL / NET: -2500 mL                                             --------------------------------------------------------------  LABS:                        10.1   10.07 )-----------( 184      ( 24 Feb 2022 06:00 )             32.3     02-24    135  |  96<L>  |  44<H>  ----------------------------<  134<H>  4.4   |  24  |  5.5<HH>    Ca    7.6<L>      24 Feb 2022 06:00  Mg     2.0     02-24    TPro  6.8  /  Alb  3.7  /  TBili  0.3  /  DBili  x   /  AST  16  /  ALT  20  /  AlkPhos  158<H>  02-24                  CARDIAC MARKERS ( 22 Feb 2022 11:00 )  x     / 0.20 ng/mL / x     / x     / 5.0 ng/mL                                              -------------------------------------------------------------  RADIOLOGY:    impression:    There is posterior heel ulcer with no definite radiographic evidence of   osteomyelitis. There is however mild adjacent cortical periosteal   reaction which may be related to distal Achilles enthesophyte. Consider   MRI of the ankle for further evaluation if clinically warranted.    No acute displaced fracture or dislocation. Vascular calcifications.   Diffuse osteoarthritis of the forefoot, midfoot and hindfoot joints.    --- End of Report ---    < from: [facility] Duplex Lower Extrem Arterial, Bilat (02.22.22 @ 21:03) >  INTERPRETATION:  Clinical History / Reason for exam: 61 years old male   with diabetic ulcer and peripheral vascular disease for evaluation of   arterial circulation.    Right:  Patent external iliac, common femoral, profunda, SFA, popliteal,   posterior tibial, anterior tibial arteries. Moderate plaque burden in SFA   in B-mode images.    Left:  Patent external iliac, common femoral, profunda, SFA, popliteal arteries.   Minimal flow in the posterior tibial and peroneal arteries. The bypass is   patent with no stenosis in the anastomosis or to bypass. Normal flow was   detected in anterior tibial artery.    IMPRESSION:  Right-patent arteries and moderate plaque in SFA. Peroneal artery was not   visualized.  Left-patent bypass with no evidence of stenosis is normal flow in distal   anterior tibial artery. Minimal flow in negative posterior tibial and   peroneal arteries.    < end of copied text >    < from: [facility] Duplex Lower Ext Vein Scan, Bilat (02.22.22 @ 21:02) >  The common femoral, great saphenous, femoral, popliteal and small   saphenous veins were visualized bilaterally with no evidence of deep   venous thrombosis    All veins were fully compressible.  There was presence of spontaneous   flow, augmentation with distal compression and phasicity.    The anterior tibial veins were  patent    The posterior tibial veins were  patent    The peroneal veins were patent.    Impression:    No evidence of deep venous thrombosis or superficial thrombophlebitis in   the bilateral lower extremities.    < end of copied text >                                            --------------------------------------------------------------    PHYSICAL EXAM:  General: Well appearing, not in acute distress  HEENT: No cervical LAD, or JVD  LUNGS: CTAB, no rales, on RA  HEART: RRR, no murmur  ABDOMEN: NBS, soft, nontender to palpation  EXT: b/l LE mild pitting edema 1+  NEURO: AAOx3  SKIN: right wound bandaged                                            --------------------------------------------------------------

## 2022-02-24 NOTE — CONSULT NOTE ADULT - SUBJECTIVE AND OBJECTIVE BOX
VASCULAR SURGERY CONSULT NOTE      HPI:  60 yo M with PMHx of CAD, s/p staged PCI CABG 2012, ESRD on HD M/W/F, Normocytic Anemia of Chronic Disease, DM Type II, Hypertension, DLD, TIA, PVD s/p left below knee popliteal to AT artery reverse saphenous vein graft in April 2017 and balloon plasty of his left lower extremity bypass graft in June 2020, and carotid stenosis s/p right carotid endarterectomy in June 2017 sent in from o/p dialysis (prior to starting HD) because he appeared pale. Pt does endorse some lethargy when he arrived at dialysis center today. He also reports some intermittent SOB with non-productive cough over the past month which is worse with exertion or when laying down. Denies any CP or palpitations today or over the past month. He was prescribed 80mg lasix PO BID but does not seem like he was taking this dose. His diet is somewhat controlled but still not ideal and he drinks lots of coffee/tea throughout the day.    In the ED: /101, vitals otherwise wnl  Trop 0.19, lower than previous  BNP >70K (21 Feb 2022 12:34)        PAST MEDICAL & SURGICAL HISTORY:  CAD (coronary artery disease)    S/P CABG (coronary artery bypass graft)  x4    Diabetes mellitus    Transient ischemic attack (TIA)  2017; 2008    Chronic kidney disease (CKD)  Stage IV    Hypertension    Stented coronary artery  in 2008    Myocardial infarction  2012    PAD (peripheral artery disease)  S/p bypass left leg    HLD (hyperlipidemia)    BPH (benign prostatic hyperplasia)    Pain in left knee  s/p fall    OA (osteoarthritis)    [place] (hard of hearing)    Chronic anemia    S/P CABG (coronary artery bypass graft)  2012    H/O arterial bypass of lower limb  Left Lower Extremity (2016)    History of surgery  Left CEA (2017)  Left Pinkie toe Amputation (2014)  CABG x 4 (2012)  Card cath - stent (2008)        No Known Allergies    Home Medications:  aspirin 81 mg oral tablet: 1 tab(s) orally once a day (21 Feb 2022 13:33)  furosemide 80 mg oral tablet: 1 tab(s) orally 2 times a day (21 Feb 2022 13:33)  Levemir 100 units/mL subcutaneous solution: 40 unit(s) subcutaneous once a day (at bedtime).  (21 Feb 2022 13:33)  Metoprolol Tartrate 50 mg oral tablet: 1 tab(s) orally 2 times a day (21 Feb 2022 13:33)  Plavix 75 mg oral tablet: 1 tab(s) orally once a day (21 Feb 2022 13:33)  Trulicity Pen: 1 milligram(s) subcutaneous once a week (21 Feb 2022 13:33)    No permtinent family history of PVD    REVIEW OF SYSTEMS:  GENERAL:                                         negative  SKIN:                                                 see HPI  OPTHALMOLOGIC:                          negative  ENMT:                                               negative  RESPIRATORY AND THORAX:        negative  CARDIOVASCULAR:                         see HPI  GASTROINTESTINAL:                       negative  NEPHROLOGY:                                  negative  MUSCULOSKELETAL:                       negative  NEUROLOGIC:                                   negative  PSYCHIATRIC:                                    negative  HEMATOLOGY/LYMPHATICS:         negative  ENDOCRINE:                                     see HPI  ALLERGIC/IMMUNOLOGIC:            negative    12 point ROS otherwise normal except as stated in HPI  FHx: none  SHX:  [ ]  smoking     [ ] alcohol use    PHYSICAL EXAM  Vital Signs Last 24 Hrs  T(C): 35.8 (24 Feb 2022 04:42), Max: 35.8 (24 Feb 2022 04:42)  T(F): 96.5 (24 Feb 2022 04:42), Max: 96.5 (24 Feb 2022 04:42)  HR: 82 (24 Feb 2022 05:36) (67 - 82)  BP: 143/71 (24 Feb 2022 05:36) (118/60 - 143/71)  BP(mean): --  RR: 18 (24 Feb 2022 04:42) (16 - 18)  SpO2: 97% (24 Feb 2022 05:36) (97% - 97%)    Appearance: Normal	  HEENT:   Normal oral mucosa, PERRL, EOMI	  Neck: Supple, - JVD;   Cardiovascular: Normal S1 S2, No JVD, No murmurs,   Respiratory: Lungs clear to auscultation, No Rales, Rhonchi, Wheezing	  Gastrointestinal:  Soft, Non-tender, positive BS	  Skin: No rashes, No ecchymoses, No cyanosis  Extremities: Normal range of motion, No clubbing, cyanosis or edema  Open Right Plantar Heel Ulcer   Right foot: Wound Probes to Soft Tissue Bone w/ Minimal Drainage; Wound Base Fibrotic. Wound margins well demarcated. Periwound: wound margins well demarcated. Minimal erythema. Dopplerable DP/PT b/l  Neurologic: Non-focal  Psychiatry: A & O x 3, Mood & affect appropriate          MEDICATIONS:   MEDICATIONS  (STANDING):  aspirin enteric coated 81 milliGRAM(s) Oral daily  atorvastatin 40 milliGRAM(s) Oral at bedtime  buMETAnide 2 milliGRAM(s) Oral <User Schedule>  buMETAnide 2 milliGRAM(s) Oral <User Schedule>  chlorhexidine 4% Liquid 1 Application(s) Topical <User Schedule>  clopidogrel Tablet 75 milliGRAM(s) Oral daily  collagenase Ointment 1 Application(s) Topical daily  dextrose 40% Gel 15 Gram(s) Oral once  dextrose 5%. 1000 milliLiter(s) (50 mL/Hr) IV Continuous <Continuous>  dextrose 5%. 1000 milliLiter(s) (100 mL/Hr) IV Continuous <Continuous>  dextrose 50% Injectable 25 Gram(s) IV Push once  dextrose 50% Injectable 12.5 Gram(s) IV Push once  dextrose 50% Injectable 25 Gram(s) IV Push once  glucagon  Injectable 1 milliGRAM(s) IntraMuscular once  heparin   Injectable 5000 Unit(s) SubCutaneous every 12 hours  insulin lispro (ADMELOG) corrective regimen sliding scale   SubCutaneous three times a day before meals  insulin lispro Injectable (ADMELOG) 3 Unit(s) SubCutaneous three times a day before meals  melatonin 5 milliGRAM(s) Oral at bedtime  metoprolol tartrate 50 milliGRAM(s) Oral two times a day  NIFEdipine XL 60 milliGRAM(s) Oral daily  pantoprazole    Tablet 40 milliGRAM(s) Oral before breakfast  sevelamer carbonate 800 milliGRAM(s) Oral three times a day with meals    MEDICATIONS  (PRN):      LAB/STUDIES:                        10.1   10.07 )-----------( 184      ( 24 Feb 2022 06:00 )             32.3     02-24    135  |  96<L>  |  44<H>  ----------------------------<  134<H>  4.4   |  24  |  5.5<HH>    Ca    7.6<L>      24 Feb 2022 06:00  Mg     2.0     02-24    TPro  6.8  /  Alb  3.7  /  TBili  0.3  /  DBili  x   /  AST  16  /  ALT  20  /  AlkPhos  158<H>  02-24      LIVER FUNCTIONS - ( 24 Feb 2022 06:00 )  Alb: 3.7 g/dL / Pro: 6.8 g/dL / ALK PHOS: 158 U/L / ALT: 20 U/L / AST: 16 U/L / GGT: x                 IMAGING:    < from: [facility] Duplex Lower Extrem Arterial, Bilat (02.22.22 @ 21:03) >  Right-patent arteries and moderate plaque in SFA. Peroneal artery was not   visualized.  Left-patent bypass with no evidence of stenosis is normal flow in distal   anterior tibial artery. Minimal flow in negative posterior tibial and   peroneal arteries.    < end of copied text >  < from: Xray Foot AP + Lateral + Oblique, Right (02.22.22 @ 08:27) >  There is posterior heel ulcer with no definite radiographic evidence of   osteomyelitis. There is however mild adjacent cortical periosteal   reaction which may be related to distal Achilles enthesophyte. Consider   MRI of the ankle for further evaluation if clinically warranted.    No acute displaced fracture or dislocation. Vascular calcifications.   Diffuse osteoarthritis of the forefoot, midfoot and hindfoot joints.

## 2022-02-24 NOTE — CONSULT NOTE ADULT - ASSESSMENT
62 yo M with PMHx of CAD, s/p staged PCI CABG 2012, ESRD on HD M/W/F, Normocytic Anemia of Chronic Disease, DM Type II, Hypertension, DLD, TIA, PVD s/p left below knee popliteal to AT artery reverse saphenous vein graft in April 2017 and balloon plasty of his left lower extremity bypass graft in June 2020, and carotid stenosis s/p right carotid endarterectomy in June 2017 sent in from o/p dialysis (prior to starting HD) because he appeared pale. Pt does endorse some lethargy when he arrived at dialysis center today. He also reports some intermittent SOB with non-productive cough over the past month which is worse with exertion or when laying down.    Vascular surgery called to evaluate right heel ulcer, diminished puses and abnormal arterial dplx for PAD    - I reviewed today's labs  - I reviewed and personally visualized all the radiology imagings  - I discussed the plan with attending Dr. Malik: for right lower extremity angiogram Monday 2/28    pre-op Sunday  Please, arrange HD early AM Monday    Patient verbalized understanding    SPECTRA [id number]

## 2022-02-24 NOTE — DISCHARGE NOTE NURSING/CASE MANAGEMENT/SOCIAL WORK - PATIENT PORTAL LINK FT
You can access the FollowMyHealth Patient Portal offered by [facility] by registering at the following website: [email or web address]. By joining [employer]’s FollowMyHealth portal, you will also be able to view your health information using other applications (apps) compatible with our system.

## 2022-02-24 NOTE — DISCHARGE NOTE NURSING/CASE MANAGEMENT/SOCIAL WORK - NSDCPEFALRISK_GEN_ALL_CORE
For information on Fall & Injury Prevention, visit: [email or web address] OR  [email or web address] OR  https://www.cdc.gov/steadi/patient.html

## 2022-02-24 NOTE — PROGRESS NOTE ADULT - ASSESSMENT
ASSESSMENT & PLAN    62 yo M with PMHx of ESRD on HD M/W/F, Normocytic Anemia of Chronic Disease, DM Type II, Hypertension, DLD, TIA, CAD s/p staged PCI CABG 2012, PVD s/p left below knee popliteal to AT artery reverse saphenous vein graft in 4/2017 and balloon plasty of his left lower extremity bypass graft in 6/2020, and carotid stenosis s/p right carotid endarterectomy in 6/2017 sent in from o/p dialysis (prior to starting HD) for appearing pale. Admitted to inpatient for dyspnea 2/2 fluid overload, improved.     # ESRD on HD MWF, non-oliguric  # Fluid Overload; suspected due to diet/fluid intake+ incorrect use of home lasix; r/o cardiac cause  - did not miss any sessions as o/p; reviewed nephro recs. s/p HD 2 days in a row. will hold HD for AM today  - diet and fluid intake not ideal and seems meds were not being taken properly  - switched from lasix to PO bumex. nephro recs reviewed: continue bumex,continue nifedipine 60mg  - c/w sevelamer TID  - BNP >70K  - TTE 5/2021 with EF 50-55% and grade 1 diastolic dysfunction; f/u repeat echo    #Elevated trops- 0.19: likely 2/2 CKD  - no active chest pain sx  - lower than previous levels- 0.25    #Right hell ulcer  - Xray ankle reviewed: posterior heel ulcer with no definitive evidence of osteomyelitis- there is mild periosteal reaction and would need MRI ankle for further evaluation  - Duplex venous reviewed: No DVT  - Duplex arterial reviewed: minimal flow in PT and peroneal arteries.   - Podiatry recs reviewed. f/u vascular consult, MRI ankle    # Anemia chronic disease- stable. Hb 10.4  - On admission Hb 9.9, mildly decreased from baseline    # DM2  - takes lantus 40 units at night and trulicity weekly  - monitor FS and start insulin regimen if consistently >180    # HTN  - initially elevated in ED  - only on metoprolol 50mg BID at home  - monitor BP after HD    # DLD  - c/w statin    # CAD s/p staged PCI CABG 2012  # PVD s/p left below knee popliteal to AT artery reverse saphenous vein graft in 4/2017 and balloon plasty of his left lower extremity bypass graft in 6/2020 # Carotid stenosis s/p right carotid endarterectomy in 6/ 2017  - c/w asa 81mg, plavix, and BB      DVT ppx: heparin SC  GI ppx: NA  Diet: DASH, CC, renal, fluid restricted  Code Status: Full Code  Handoff: f/u MRI, podiatry recs   ASSESSMENT & PLAN    60 yo M with PMHx of ESRD on HD M/W/F, Normocytic Anemia of Chronic Disease, DM Type II, Hypertension, DLD, TIA, CAD s/p staged PCI CABG 2012, PVD s/p left below knee popliteal to AT artery reverse saphenous vein graft in 4/2017 and balloon plasty of his left lower extremity bypass graft in 6/2020, and carotid stenosis s/p right carotid endarterectomy in 6/2017 sent in from o/p dialysis (prior to starting HD) for appearing pale. Admitted to inpatient for dyspnea 2/2 fluid overload, improved.     # ESRD on HD MWF, non-oliguric  # Fluid Overload; suspected due to diet/fluid intake+ incorrect use of home lasix; r/o cardiac cause  - did not miss any sessions as o/p; reviewed nephro recs. s/p HD 2 days in a row. will hold HD for AM today  - diet and fluid intake not ideal and seems meds were not being taken properly  - switched from lasix to PO bumex. nephro recs reviewed: continue bumex,continue nifedipine 60mg  - c/w sevelamer TID  - BNP >70K  - TTE 5/2021 with EF 50-55% and grade 1 diastolic dysfunction; f/u repeat echo    #Elevated trops- 0.19: likely 2/2 CKD  - no active chest pain sx  - lower than previous levels- 0.25    #Right hell ulcer  - Xray ankle reviewed: posterior heel ulcer with no definitive evidence of osteomyelitis- there is mild periosteal reaction and would need MRI ankle for further evaluation  - Duplex venous reviewed: No DVT  - Duplex arterial reviewed: minimal flow in PT and peroneal arteries.   - Podiatry recs reviewed. f/u vascular consult, MRI ankle    # Anemia chronic disease- stable. Hb 10.4  - On admission Hb 9.9, mildly decreased from baseline    # DM2  - takes lantus 40 units at night and trulicity weekly  - monitor FS and start insulin regimen if consistently >180    # HTN  - initially elevated in ED  - only on metoprolol 50mg BID at home  - monitor BP after HD    # DLD  - c/w statin    # CAD s/p staged PCI CABG 2012  # PVD s/p left below knee popliteal to AT artery reverse saphenous vein graft in 4/2017 and balloon plasty of his left lower extremity bypass graft in 6/2020 # Carotid stenosis s/p right carotid endarterectomy in 6/ 2017  - c/w asa 81mg, plavix, and BB    #DM2  - on SSI      DVT ppx: heparin SC  GI ppx: NA  Diet: DASH, CC, renal, fluid restricted  Code Status: Full Code  Handoff: f/u MRI, podiatry recs

## 2022-02-25 LAB
ALBUMIN SERPL ELPH-MCNC: 3.6 G/DL — SIGNIFICANT CHANGE UP (ref 3.5–5.2)
ALP SERPL-CCNC: 139 U/L — HIGH (ref 30–115)
ALT FLD-CCNC: 16 U/L — SIGNIFICANT CHANGE UP (ref 0–41)
ANION GAP SERPL CALC-SCNC: 18 MMOL/L — HIGH (ref 7–14)
AST SERPL-CCNC: 14 U/L — SIGNIFICANT CHANGE UP (ref 0–41)
BILIRUB SERPL-MCNC: 0.3 MG/DL — SIGNIFICANT CHANGE UP (ref 0.2–1.2)
BUN SERPL-MCNC: 62 MG/DL — CRITICAL HIGH (ref 10–20)
CALCIUM SERPL-MCNC: 7.4 MG/DL — LOW (ref 8.5–10.1)
CHLORIDE SERPL-SCNC: 94 MMOL/L — LOW (ref 98–110)
CO2 SERPL-SCNC: 23 MMOL/L — SIGNIFICANT CHANGE UP (ref 17–32)
CREAT SERPL-MCNC: 7 MG/DL — CRITICAL HIGH (ref 0.7–1.5)
GLUCOSE BLDC GLUCOMTR-MCNC: 131 MG/DL — HIGH (ref 70–99)
GLUCOSE BLDC GLUCOMTR-MCNC: 152 MG/DL — HIGH (ref 70–99)
GLUCOSE BLDC GLUCOMTR-MCNC: 195 MG/DL — HIGH (ref 70–99)
GLUCOSE BLDC GLUCOMTR-MCNC: 218 MG/DL — HIGH (ref 70–99)
GLUCOSE SERPL-MCNC: 153 MG/DL — HIGH (ref 70–99)
HCT VFR BLD CALC: 32.1 % — LOW (ref 42–52)
HGB BLD-MCNC: 10.1 G/DL — LOW (ref 14–18)
MAGNESIUM SERPL-MCNC: 2 MG/DL — SIGNIFICANT CHANGE UP (ref 1.8–2.4)
MCHC RBC-ENTMCNC: 30.1 PG — SIGNIFICANT CHANGE UP (ref 27–31)
MCHC RBC-ENTMCNC: 31.5 G/DL — LOW (ref 32–37)
MCV RBC AUTO: 95.8 FL — HIGH (ref 80–94)
NRBC # BLD: 0 /100 WBCS — SIGNIFICANT CHANGE UP (ref 0–0)
PLATELET # BLD AUTO: 200 K/UL — SIGNIFICANT CHANGE UP (ref 130–400)
POTASSIUM SERPL-MCNC: 4.9 MMOL/L — SIGNIFICANT CHANGE UP (ref 3.5–5)
POTASSIUM SERPL-SCNC: 4.9 MMOL/L — SIGNIFICANT CHANGE UP (ref 3.5–5)
PROT SERPL-MCNC: 7 G/DL — SIGNIFICANT CHANGE UP (ref 6–8)
RBC # BLD: 3.35 M/UL — LOW (ref 4.7–6.1)
RBC # FLD: 15 % — HIGH (ref 11.5–14.5)
SODIUM SERPL-SCNC: 135 MMOL/L — SIGNIFICANT CHANGE UP (ref 135–146)
WBC # BLD: 10.68 K/UL — SIGNIFICANT CHANGE UP (ref 4.8–10.8)
WBC # FLD AUTO: 10.68 K/UL — SIGNIFICANT CHANGE UP (ref 4.8–10.8)

## 2022-02-25 PROCEDURE — 99233 SBSQ HOSP IP/OBS HIGH 50: CPT

## 2022-02-25 PROCEDURE — 99223 1ST HOSP IP/OBS HIGH 75: CPT

## 2022-02-25 RX ADMIN — ATORVASTATIN CALCIUM 40 MILLIGRAM(S): 80 TABLET, FILM COATED ORAL at 21:26

## 2022-02-25 RX ADMIN — HEPARIN SODIUM 5000 UNIT(S): 5000 INJECTION INTRAVENOUS; SUBCUTANEOUS at 05:32

## 2022-02-25 RX ADMIN — BUMETANIDE 2 MILLIGRAM(S): 0.25 INJECTION INTRAMUSCULAR; INTRAVENOUS at 19:23

## 2022-02-25 RX ADMIN — Medication 3 UNIT(S): at 11:54

## 2022-02-25 RX ADMIN — Medication 3 UNIT(S): at 08:09

## 2022-02-25 RX ADMIN — PANTOPRAZOLE SODIUM 40 MILLIGRAM(S): 20 TABLET, DELAYED RELEASE ORAL at 05:32

## 2022-02-25 RX ADMIN — Medication 1 APPLICATION(S): at 11:52

## 2022-02-25 RX ADMIN — Medication 3 UNIT(S): at 17:29

## 2022-02-25 RX ADMIN — Medication 50 MILLIGRAM(S): at 17:26

## 2022-02-25 RX ADMIN — Medication 50 MILLIGRAM(S): at 05:32

## 2022-02-25 RX ADMIN — Medication 5 MILLIGRAM(S): at 21:26

## 2022-02-25 RX ADMIN — Medication 81 MILLIGRAM(S): at 11:51

## 2022-02-25 RX ADMIN — Medication 1: at 08:09

## 2022-02-25 RX ADMIN — SEVELAMER CARBONATE 800 MILLIGRAM(S): 2400 POWDER, FOR SUSPENSION ORAL at 17:25

## 2022-02-25 RX ADMIN — Medication 60 MILLIGRAM(S): at 05:32

## 2022-02-25 RX ADMIN — Medication 2: at 17:29

## 2022-02-25 RX ADMIN — SEVELAMER CARBONATE 800 MILLIGRAM(S): 2400 POWDER, FOR SUSPENSION ORAL at 11:52

## 2022-02-25 RX ADMIN — HEPARIN SODIUM 5000 UNIT(S): 5000 INJECTION INTRAVENOUS; SUBCUTANEOUS at 17:27

## 2022-02-25 RX ADMIN — CLOPIDOGREL BISULFATE 75 MILLIGRAM(S): 75 TABLET, FILM COATED ORAL at 11:51

## 2022-02-25 RX ADMIN — SEVELAMER CARBONATE 800 MILLIGRAM(S): 2400 POWDER, FOR SUSPENSION ORAL at 08:08

## 2022-02-25 NOTE — PROGRESS NOTE ADULT - SUBJECTIVE AND OBJECTIVE BOX
Nephrology progress note    THIS IS AN INCOMPLETE NOTE . FULL NOTE TO FOLLOW SHORTLY    Patient is seen and examined, events over the last 24 h noted .    Allergies:  No Known Allergies    Hospital Medications:   MEDICATIONS  (STANDING):  aspirin enteric coated 81 milliGRAM(s) Oral daily  atorvastatin 40 milliGRAM(s) Oral at bedtime  buMETAnide 2 milliGRAM(s) Oral <User Schedule>  buMETAnide 2 milliGRAM(s) Oral <User Schedule>  chlorhexidine 4% Liquid 1 Application(s) Topical <User Schedule>  clopidogrel Tablet 75 milliGRAM(s) Oral daily  collagenase Ointment 1 Application(s) Topical daily  dextrose 40% Gel 15 Gram(s) Oral once  dextrose 5%. 1000 milliLiter(s) (50 mL/Hr) IV Continuous <Continuous>  dextrose 5%. 1000 milliLiter(s) (100 mL/Hr) IV Continuous <Continuous>  dextrose 50% Injectable 25 Gram(s) IV Push once  dextrose 50% Injectable 12.5 Gram(s) IV Push once  dextrose 50% Injectable 25 Gram(s) IV Push once  glucagon  Injectable 1 milliGRAM(s) IntraMuscular once  heparin   Injectable 5000 Unit(s) SubCutaneous every 12 hours  insulin lispro (ADMELOG) corrective regimen sliding scale   SubCutaneous three times a day before meals  insulin lispro Injectable (ADMELOG) 3 Unit(s) SubCutaneous three times a day before meals  melatonin 5 milliGRAM(s) Oral at bedtime  metoprolol tartrate 50 milliGRAM(s) Oral two times a day  NIFEdipine XL 60 milliGRAM(s) Oral daily  pantoprazole    Tablet 40 milliGRAM(s) Oral before breakfast  sevelamer carbonate 800 milliGRAM(s) Oral three times a day with meals        VITALS:  T(F): 96.1 (02-25-22 @ 05:54), Max: 96.9 (02-24-22 @ 14:52)  HR: 65 (02-25-22 @ 08:20)  BP: 158/91 (02-25-22 @ 08:20)  RR: 18 (02-25-22 @ 08:20)  SpO2: 97% (02-24-22 @ 19:00)  Wt(kg): --    02-23 @ 07:01  -  02-24 @ 07:00  --------------------------------------------------------  IN: 0 mL / OUT: 2500 mL / NET: -2500 mL          PHYSICAL EXAM:  Constitutional: NAD  HEENT: anicteric sclera, oropharynx clear, MMM  Neck: No JVD  Respiratory: CTAB, no wheezes, rales or rhonchi  Cardiovascular: S1, S2, RRR  Gastrointestinal: BS+, soft, NT/ND  Extremities: No cyanosis or clubbing. No peripheral edema  :  No schneider.   Skin: No rashes    LABS:  02-25    135  |  94<L>  |  62<HH>  ----------------------------<  153<H>  4.9   |  23  |  7.0<HH>    Ca    7.4<L>      25 Feb 2022 07:00  Mg     2.0     02-25    TPro  7.0  /  Alb  3.6  /  TBili  0.3  /  DBili      /  AST  14  /  ALT  16  /  AlkPhos  139<H>  02-25                          10.1   10.68 )-----------( 200      ( 25 Feb 2022 07:00 )             32.1       Urine Studies:      RADIOLOGY & ADDITIONAL STUDIES:   Nephrology progress note  Patient is seen and examined, events over the last 24 h noted .  Lying in bed comfortable   No events     Allergies:  No Known Allergies    Hospital Medications:   MEDICATIONS  (STANDING):  aspirin enteric coated 81 milliGRAM(s) Oral daily  atorvastatin 40 milliGRAM(s) Oral at bedtime  buMETAnide 2 milliGRAM(s) Oral <User Schedule>  buMETAnide 2 milliGRAM(s) Oral <User Schedule>  clopidogrel Tablet 75 milliGRAM(s) Oral daily  collagenase Ointment 1 Application(s) Topical daily  glucagon  Injectable 1 milliGRAM(s) IntraMuscular once  heparin   Injectable 5000 Unit(s) SubCutaneous every 12 hours  insulin lispro (ADMELOG) corrective regimen sliding scale   SubCutaneous three times a day before meals  insulin lispro Injectable (ADMELOG) 3 Unit(s) SubCutaneous three times a day before meals  melatonin 5 milliGRAM(s) Oral at bedtime  metoprolol tartrate 50 milliGRAM(s) Oral two times a day  NIFEdipine XL 60 milliGRAM(s) Oral daily  pantoprazole    Tablet 40 milliGRAM(s) Oral before breakfast  sevelamer carbonate 800 milliGRAM(s) Oral three times a day with meals        VITALS:  T(F): 96.1 (02-25-22 @ 05:54), Max: 96.9 (02-24-22 @ 14:52)  HR: 65 (02-25-22 @ 08:20)  BP: 158/91 (02-25-22 @ 08:20)  RR: 18 (02-25-22 @ 08:20)  SpO2: 97% (02-24-22 @ 19:00)      02-23 @ 07:01  -  02-24 @ 07:00  --------------------------------------------------------  IN: 0 mL / OUT: 2500 mL / NET: -2500 mL          PHYSICAL EXAM:  Constitutional: NAD  Neck: No JVD  Respiratory: CTAB,  Cardiovascular: S1, S2, RRR  Gastrointestinal: BS+, soft, NT/ND  Extremities: No cyanosis or clubbing. No peripheral edema  :  No schneider.   Skin: No rashes    LABS:  02-25    135  |  94<L>  |  62<HH>  ----------------------------<  153<H>  4.9   |  23  |  7.0<HH>    Ca    7.4<L>      25 Feb 2022 07:00  Mg     2.0     02-25    TPro  7.0  /  Alb  3.6  /  TBili  0.3  /  DBili      /  AST  14  /  ALT  16  /  AlkPhos  139<H>  02-25                          10.1   10.68 )-----------( 200      ( 25 Feb 2022 07:00 )             32.1       Urine Studies:      RADIOLOGY & ADDITIONAL STUDIES:

## 2022-02-25 NOTE — PROGRESS NOTE ADULT - ASSESSMENT
# ESRD on HD   # Hypervolemia  # HTN  # Normocytic anemia / CKD  # MBD    Recommendations:  -  HD today 3h opti 160 UF 3l as tolerated   - pt is non-oliguric, continue bumex  - phos level noted , on sevelamer  - BP noted keep current   - appreciate vascular eval and follow up for angiogram on Monday morning   - will follow

## 2022-02-25 NOTE — CONSULT NOTE ADULT - ASSESSMENT
ASSESSMENT  62 yo M with PMHx of CAD, s/p staged PCI CABG 2012, ESRD on HD M/W/F, Normocytic Anemia of Chronic Disease, DM Type II, Hypertension, DLD, TIA, PVD s/p left below knee popliteal to AT artery reverse saphenous vein graft in April 2017 and balloon plasty of his left lower extremity bypass graft in June 2020, and carotid stenosis s/p right carotid endarterectomy in June 2017 sent in from o/p dialysis (prior to starting HD) because he appeared pale.     IMPRESSION  #Infected posterior heel ulcer with osteomyelitis   < from: MR Ankle No Cont, Right (02.24.22 @ 22:06) >  Posterior heel ulcer with subcutaneous edema, without drainable fluid collection. Osteomyelitis involving the posteromedial calcaneal tuberosity, as above.  #Morbid Obesity BMI (kg/m2): 35.7  #ESRD on HD  Creatinine, Serum: 7.0 (02-25-22 @ 07:00)    Weight (kg): 103.4 (02-21-22 @ 07:16)    RECOMMENDATIONS  - ESR, CRP  - HOLD Abx until CX  - Podiatry planning on WCX, appreciate consult  - AFTER CX obtained. Vanc 1.25 g post HD on HD days, PO flagyl 500mg TID, Cefepime 2g post HD on HD days  - Likely 6 weeks of ABX post HD , no central line needed    If any questions, please call or send a message on [email or web address]  Please continue to update ID with any pertinent new laboratory or radiographic findings  Spectra [id number]

## 2022-02-25 NOTE — CONSULT NOTE ADULT - SUBJECTIVE AND OBJECTIVE BOX
SCHWABACHER, LAWRENCE  61y, Male  Allergy: No Known Allergies      CHIEF COMPLAINT:   right heel ulcer (24 Feb 2022 13:18)      LOS  4d    HPI  HPI:  62 yo M with PMHx of CAD, s/p staged PCI CABG 2012, ESRD on HD M/W/F, Normocytic Anemia of Chronic Disease, DM Type II, Hypertension, DLD, TIA, PVD s/p left below knee popliteal to AT artery reverse saphenous vein graft in April 2017 and balloon plasty of his left lower extremity bypass graft in June 2020, and carotid stenosis s/p right carotid endarterectomy in June 2017 sent in from o/p dialysis (prior to starting HD) because he appeared pale. Pt does endorse some lethargy when he arrived at dialysis center today. He also reports some intermittent SOB with non-productive cough over the past month which is worse with exertion or when laying down. Denies any CP or palpitations today or over the past month. He was prescribed 80mg lasix PO BID but does not seem like he was taking this dose. His diet is somewhat controlled but still not ideal and he drinks lots of coffee/tea throughout the day.    In the ED: /101, vitals otherwise wnl  Trop 0.19, lower than previous  BNP >70K (21 Feb 2022 12:34)      INFECTIOUS DISEASE HISTORY:  ID consulted for osteomyelitis seen on MRI      PMH  PAST MEDICAL & SURGICAL HISTORY:  CAD (coronary artery disease)    S/P CABG (coronary artery bypass graft)  x4    Diabetes mellitus    Transient ischemic attack (TIA)  2017; 2008    Chronic kidney disease (CKD)  Stage IV    Hypertension    Stented coronary artery  in 2008    Myocardial infarction  2012    PAD (peripheral artery disease)  S/p bypass left leg    HLD (hyperlipidemia)    BPH (benign prostatic hyperplasia)    Pain in left knee  s/p fall    OA (osteoarthritis)    [place] (hard of hearing)    Chronic anemia    S/P CABG (coronary artery bypass graft)  2012    H/O arterial bypass of lower limb  Left Lower Extremity (2016)    History of surgery  Left CEA (2017)  Left Pinkie toe Amputation (2014)  CABG x 4 (2012)  Card cath - stent (2008)          FAMILY HISTORY  Family history of heart disease (Father)    DM (diabetes mellitus) (Mother)    ESRD (end stage renal disease) on dialysis (Mother)        SOCIAL HISTORY  Social History:  Lives at home alone.  Denies smoking, alcohol, or illicit drug use. (21 Feb 2022 12:34)        ROS  General: Denies rigors, nightsweats  HEENT: Denies headache, rhinorrhea, sore throat, eye pain  CV: Denies CP, palpitations  PULM: Denies wheezing, hemoptysis  GI: Denies hematemesis, hematochezia, melena  : Denies discharge, hematuria  MSK: Denies arthralgias, myalgias  SKIN: Denies rash, lesions  NEURO: Denies paresthesias, weakness  PSYCH: Denies depression, anxiety     VITALS:  T(F): 97.4, Max: 97.4 (02-25-22 @ 14:05)  HR: 68  BP: 147/78  RR: 20Vital Signs Last 24 Hrs  T(C): 36.3 (25 Feb 2022 14:05), Max: 36.3 (25 Feb 2022 14:05)  T(F): 97.4 (25 Feb 2022 14:05), Max: 97.4 (25 Feb 2022 14:05)  HR: 68 (25 Feb 2022 14:05) (65 - 69)  BP: 147/78 (25 Feb 2022 14:05) (137/66 - 179/78)  BP(mean): --  RR: 20 (25 Feb 2022 14:45) (18 - 20)  SpO2: 97% (24 Feb 2022 19:00) (97% - 97%)    PHYSICAL EXAM:  Gen: NAD, resting in bed  HEENT: Normocephalic, atraumatic  Neck: supple, no lymphadenopathy  CV: Regular rate & regular rhythm  Lungs: decreased BS at bases, no fremitus  Abdomen: Soft, BS present  Ext: Warm, well perfused AVF, LE dressings   Neuro: non focal, awake  Skin: no rash, no erythema  Lines: no phlebitis     TESTS & MEASUREMENTS:                        10.1   10.68 )-----------( 200      ( 25 Feb 2022 07:00 )             32.1     02-25    135  |  94<L>  |  62<HH>  ----------------------------<  153<H>  4.9   |  23  |  7.0<HH>    Ca    7.4<L>      25 Feb 2022 07:00  Mg     2.0     02-25    TPro  7.0  /  Alb  3.6  /  TBili  0.3  /  DBili  x   /  AST  14  /  ALT  16  /  AlkPhos  139<H>  02-25    eGFR if Non African American: 8 mL/min/1.73M2 (02-25-22 @ 07:00)  eGFR if : 9 mL/min/1.73M2 (02-25-22 @ 07:00)    LIVER FUNCTIONS - ( 25 Feb 2022 07:00 )  Alb: 3.6 g/dL / Pro: 7.0 g/dL / ALK PHOS: 139 U/L / ALT: 16 U/L / AST: 14 U/L / GGT: x                 Blood Gas Venous - Lactate: 1.00 mmol/L (02-21-22 @ 07:26)      INFECTIOUS DISEASES TESTING  COVID-19 PCR: NotDetec (02-23-22 @ 11:50)  COVID-19 PCR: NotDetec (02-21-22 @ 10:25)  COVID-19 PCR: NotDetec (08-14-21 @ 00:30)  COVID-19 PCR: NotDetec (05-15-21 @ 17:00)      INFLAMMATORY MARKERS      RADIOLOGY & ADDITIONAL TESTS:  I have personally reviewed the last Chest xray  CXR      CT      CARDIOLOGY TESTING  12 Lead ECG:   Ventricular Rate 66 BPM    Atrial Rate 66 BPM    P-R Interval 180 ms    QRS Duration 92 ms    Q-T Interval 464 ms    QTC Calculation(Bazett) 486 ms    P Axis 39 degrees    R Axis 63 degrees    T Axis 151 degrees    Diagnosis Line Normal sinus rhythm  Nonspecific T wave abnormality  Prolonged QT  Abnormal ECG    Confirmed by Ramirez Garcia ([id number]) on 2/22/2022 9:10:38 AM (02-22-22 @ 08:51)  12 Lead ECG:   Ventricular Rate 75 BPM    Atrial Rate 75 BPM    P-R Interval 204 ms    QRS Duration 98 ms    Q-T Interval 470 ms    QTC Calculation(Bazett) 524 ms    P Axis 26 degrees    R Axis -38 degrees    T Axis 107 degrees    Diagnosis Line Normal sinus rhythm  Left axis deviation  Anterior infarct , age undetermined  T wave abnormality, consider lateral ischemia  Prolonged QT  Abnormal ECG    Confirmed by Ramirez Garcia ([id number]) on 2/22/2022 8:56:16 AM (02-21-22 @ 10:48)      MEDICATIONS  aspirin enteric coated 81 Oral daily  atorvastatin 40 Oral at bedtime  buMETAnide 2 Oral <User Schedule>  buMETAnide 2 Oral <User Schedule>  chlorhexidine 4% Liquid 1 Topical <User Schedule>  clopidogrel Tablet 75 Oral daily  collagenase Ointment 1 Topical daily  dextrose 40% Gel 15 Oral once  dextrose 5%. 1000 IV Continuous <Continuous>  dextrose 5%. 1000 IV Continuous <Continuous>  dextrose 50% Injectable 25 IV Push once  dextrose 50% Injectable 12.5 IV Push once  dextrose 50% Injectable 25 IV Push once  glucagon  Injectable 1 IntraMuscular once  heparin   Injectable 5000 SubCutaneous every 12 hours  insulin lispro (ADMELOG) corrective regimen sliding scale  SubCutaneous three times a day before meals  insulin lispro Injectable (ADMELOG) 3 SubCutaneous three times a day before meals  melatonin 5 Oral at bedtime  metoprolol tartrate 50 Oral two times a day  NIFEdipine XL 60 Oral daily  pantoprazole    Tablet 40 Oral before breakfast  sevelamer carbonate 800 Oral three times a day with meals        ANTIBIOTICS:      ALLERGIES:  No Known Allergies

## 2022-02-25 NOTE — PROGRESS NOTE ADULT - ASSESSMENT
ASSESSMENT & PLAN    60 yo M with PMHx of ESRD on HD M/W/F, Normocytic Anemia of Chronic Disease, DM Type II, Hypertension, DLD, TIA, CAD s/p staged PCI CABG 2012, PVD s/p left below knee popliteal to AT artery reverse saphenous vein graft in 4/2017 and balloon plasty of his left lower extremity bypass graft in 6/2020, and carotid stenosis s/p right carotid endarterectomy in 6/2017 sent in from o/p dialysis (prior to starting HD) for appearing pale. Admitted to inpatient for dyspnea 2/2 fluid overload, improved.     # ESRD on HD MWF, non-oliguric  # Fluid Overload; suspected due to diet/fluid intake+ incorrect use of home lasix; r/o cardiac cause  - did not miss any sessions as o/p; reviewed nephro recs. s/p HD 2 days in a row. will hold HD for AM today  - diet and fluid intake not ideal and seems meds were not being taken properly  - switched from lasix to PO bumex. nephro recs reviewed: continue bumex,continue nifedipine 60mg  - c/w sevelamer TID  - BNP >70K  - TTE 5/2021 with EF 50-55% and grade 1 diastolic dysfunction; f/u repeat echo    #Elevated trops- 0.19: likely 2/2 CKD  - no active chest pain sx  - lower than previous levels- 0.25    #Right hell ulcer  - Xray ankle reviewed: posterior heel ulcer with no definitive evidence of osteomyelitis- there is mild periosteal reaction and would need MRI ankle for further evaluation  - Duplex venous reviewed: No DVT  - Duplex arterial reviewed: minimal flow in PT and peroneal arteries.   - Podiatry recs reviewed and vascular reviewed. pending Monday angiogram    # Anemia chronic disease- stable. Hb 10.4  - On admission Hb 9.9, mildly decreased from baseline    # DM2  - takes lantus 40 units at night and trulicity weekly  - monitor FS and start insulin regimen if consistently >180    # HTN  - initially elevated in ED  - only on metoprolol 50mg BID at home  - monitor BP after HD    # DLD  - c/w statin    # CAD s/p staged PCI CABG 2012  # PVD s/p left below knee popliteal to AT artery reverse saphenous vein graft in 4/2017 and balloon plasty of his left lower extremity bypass graft in 6/2020 # Carotid stenosis s/p right carotid endarterectomy in 6/ 2017  - c/w asa 81mg, plavix, and BB    #DM2  - on SSI      DVT ppx: heparin SC  GI ppx: NA  Diet: DASH, CC, renal, fluid restricted  Code Status: Full Code  Handoff: f/u MRI, podiatry recs ASSESSMENT & PLAN    60 yo M with PMHx of ESRD on HD M/W/F, Normocytic Anemia of Chronic Disease, DM Type II, Hypertension, DLD, TIA, CAD s/p staged PCI CABG 2012, PVD s/p left below knee popliteal to AT artery reverse saphenous vein graft in 4/2017 and balloon plasty of his left lower extremity bypass graft in 6/2020, and carotid stenosis s/p right carotid endarterectomy in 6/2017 sent in from o/p dialysis (prior to starting HD) for appearing pale. Admitted to inpatient for dyspnea 2/2 fluid overload, improved.     # ESRD on HD MWF, non-oliguric  # Fluid Overload; suspected due to diet/fluid intake+ incorrect use of home lasix; r/o cardiac cause  - did not miss any sessions as o/p; reviewed nephro recs. s/p HD 2 days in a row. will hold HD for AM today  - diet and fluid intake not ideal and seems meds were not being taken properly  - switched from lasix to PO bumex. nephro recs reviewed: continue bumex,continue nifedipine 60mg  - c/w sevelamer TID  - BNP >70K  - TTE 5/2021 with EF 50-55% and grade 1 diastolic dysfunction; f/u repeat echo    #Elevated trops- 0.19: likely 2/2 CKD  - no active chest pain sx  - lower than previous levels- 0.25    #Right hell ulcer  - Xray ankle reviewed: posterior heel ulcer with no definitive evidence of osteomyelitis- there is mild periosteal reaction and would need MRI ankle for further evaluation  - Duplex venous reviewed: No DVT  - Duplex arterial reviewed: minimal flow in PT and peroneal arteries.   - Podiatry recs reviewed and vascular reviewed. pending Monday angiogram    # Anemia chronic disease- stable. Hb 10.4  - On admission Hb 9.9, mildly decreased from baseline    # DM2  - takes lantus 40 units at night and trulicity weekly  - monitor FS and start insulin regimen if consistently >180    # HTN  - initially elevated in ED  - only on metoprolol 50mg BID at home  - monitor BP after HD    # DLD  - c/w statin    # CAD s/p staged PCI CABG 2012  # PVD s/p left below knee popliteal to AT artery reverse saphenous vein graft in 4/2017 and balloon plasty of his left lower extremity bypass graft in 6/2020 # Carotid stenosis s/p right carotid endarterectomy in 6/ 2017  - c/w asa 81mg, plavix, and BB    #DM2  - on SSI      DVT ppx: heparin SC  GI ppx: NA  Diet: DASH, CC, renal, fluid restricted  Code Status: Full Code  Handoff: pending Monday angiogram

## 2022-02-25 NOTE — PROGRESS NOTE ADULT - SUBJECTIVE AND OBJECTIVE BOX
LAWRENCE SCHWABACHER 61y Male  MRN#: [id number]   CODE STATUS: full code    Hospital Day: 4d    Pt is currently admitted with the primary diagnosis of fluid overload    SUBJECTIVE  Hospital Course    Overnight events: 24 hour events reviewed. No subjective complaints.                                                   ----------------------------------------------------------  OBJECTIVE  PAST MEDICAL & SURGICAL HISTORY  CAD (coronary artery disease)    S/P CABG (coronary artery bypass graft)  x4    Diabetes mellitus    Transient ischemic attack (TIA)  2017; 2008    Chronic kidney disease (CKD)  Stage IV    Hypertension    Stented coronary artery  in 2008    Myocardial infarction  2012    PAD (peripheral artery disease)  S/p bypass left leg    HLD (hyperlipidemia)    BPH (benign prostatic hyperplasia)    Pain in left knee  s/p fall    OA (osteoarthritis)    [place] (hard of hearing)    Chronic anemia    S/P CABG (coronary artery bypass graft)  2012    H/O arterial bypass of lower limb  Left Lower Extremity (2016)    History of surgery  Left CEA (2017)  Left Pinkie toe Amputation (2014)  CABG x 4 (2012)  Card cath - stent (2008)                                                -----------------------------------------------------------  ALLERGIES:  No Known Allergies                                            ------------------------------------------------------------    HOME MEDICATIONS  Home Medications:  aspirin 81 mg oral tablet: 1 tab(s) orally once a day (21 Feb 2022 13:33)  furosemide 80 mg oral tablet: 1 tab(s) orally 2 times a day (21 Feb 2022 13:33)  Levemir 100 units/mL subcutaneous solution: 40 unit(s) subcutaneous once a day (at bedtime).  (21 Feb 2022 13:33)  Metoprolol Tartrate 50 mg oral tablet: 1 tab(s) orally 2 times a day (21 Feb 2022 13:33)  Plavix 75 mg oral tablet: 1 tab(s) orally once a day (21 Feb 2022 13:33)  Trulicity Pen: 1 milligram(s) subcutaneous once a week (21 Feb 2022 13:33)                           MEDICATIONS:  STANDING MEDICATIONS  aspirin enteric coated 81 milliGRAM(s) Oral daily  atorvastatin 40 milliGRAM(s) Oral at bedtime  buMETAnide 2 milliGRAM(s) Oral <User Schedule>  buMETAnide 2 milliGRAM(s) Oral <User Schedule>  chlorhexidine 4% Liquid 1 Application(s) Topical <User Schedule>  clopidogrel Tablet 75 milliGRAM(s) Oral daily  collagenase Ointment 1 Application(s) Topical daily  dextrose 40% Gel 15 Gram(s) Oral once  dextrose 5%. 1000 milliLiter(s) IV Continuous <Continuous>  dextrose 5%. 1000 milliLiter(s) IV Continuous <Continuous>  dextrose 50% Injectable 25 Gram(s) IV Push once  dextrose 50% Injectable 12.5 Gram(s) IV Push once  dextrose 50% Injectable 25 Gram(s) IV Push once  glucagon  Injectable 1 milliGRAM(s) IntraMuscular once  heparin   Injectable 5000 Unit(s) SubCutaneous every 12 hours  insulin lispro (ADMELOG) corrective regimen sliding scale   SubCutaneous three times a day before meals  insulin lispro Injectable (ADMELOG) 3 Unit(s) SubCutaneous three times a day before meals  melatonin 5 milliGRAM(s) Oral at bedtime  metoprolol tartrate 50 milliGRAM(s) Oral two times a day  NIFEdipine XL 60 milliGRAM(s) Oral daily  pantoprazole    Tablet 40 milliGRAM(s) Oral before breakfast  sevelamer carbonate 800 milliGRAM(s) Oral three times a day with meals    PRN MEDICATIONS                                            ------------------------------------------------------------  VITAL SIGNS: Last 24 Hours  T(C): 35.6 (25 Feb 2022 05:54), Max: 36.1 (24 Feb 2022 14:52)  T(F): 96.1 (25 Feb 2022 05:54), Max: 96.9 (24 Feb 2022 14:52)  HR: 65 (25 Feb 2022 08:20) (65 - 69)  BP: 158/91 (25 Feb 2022 08:20) (137/66 - 179/78)  BP(mean): --  RR: 18 (25 Feb 2022 08:20) (18 - 19)  SpO2: 97% (24 Feb 2022 19:00) (97% - 97%)                                             --------------------------------------------------------------  LABS:                        10.1   10.68 )-----------( 200      ( 25 Feb 2022 07:00 )             32.1     02-25    135  |  94<L>  |  62<HH>  ----------------------------<  153<H>  4.9   |  23  |  7.0<HH>    Ca    7.4<L>      25 Feb 2022 07:00  Mg     2.0     02-25    TPro  7.0  /  Alb  3.6  /  TBili  0.3  /  DBili  x   /  AST  14  /  ALT  16  /  AlkPhos  139<H>  02-25                                                              -------------------------------------------------------------  RADIOLOGY:                                              --------------------------------------------------------------    PHYSICAL EXAM:  General: Well appearing, not in acute distress  HEENT: No cervical LAD, or JVD  LUNGS: CTAB, no rales, on RA  HEART: RRR, no murmur  ABDOMEN: NBS, soft, nontender to palpation  EXT: b/l LE mild pitting edema 1+  NEURO: AAOx3  SKIN: right wound bandaged                                            --------------------------------------------------------------

## 2022-02-26 LAB
24R-OH-CALCIDIOL SERPL-MCNC: 21 NG/ML — LOW (ref 30–80)
ALBUMIN SERPL ELPH-MCNC: 3.6 G/DL — SIGNIFICANT CHANGE UP (ref 3.5–5.2)
ALP SERPL-CCNC: 137 U/L — HIGH (ref 30–115)
ALT FLD-CCNC: 14 U/L — SIGNIFICANT CHANGE UP (ref 0–41)
ANION GAP SERPL CALC-SCNC: 14 MMOL/L — SIGNIFICANT CHANGE UP (ref 7–14)
AST SERPL-CCNC: 13 U/L — SIGNIFICANT CHANGE UP (ref 0–41)
BILIRUB SERPL-MCNC: 0.3 MG/DL — SIGNIFICANT CHANGE UP (ref 0.2–1.2)
BUN SERPL-MCNC: 51 MG/DL — HIGH (ref 10–20)
CALCIUM SERPL-MCNC: 7.4 MG/DL — LOW (ref 8.5–10.1)
CALCIUM SERPL-MCNC: 7.5 MG/DL — LOW (ref 8.4–10.5)
CHLORIDE SERPL-SCNC: 95 MMOL/L — LOW (ref 98–110)
CO2 SERPL-SCNC: 27 MMOL/L — SIGNIFICANT CHANGE UP (ref 17–32)
CREAT SERPL-MCNC: 5.9 MG/DL — CRITICAL HIGH (ref 0.7–1.5)
CRP SERPL-MCNC: 14 MG/L — HIGH
ERYTHROCYTE [SEDIMENTATION RATE] IN BLOOD: 25 MM/HR — HIGH (ref 0–10)
GLUCOSE BLDC GLUCOMTR-MCNC: 151 MG/DL — HIGH (ref 70–99)
GLUCOSE BLDC GLUCOMTR-MCNC: 152 MG/DL — HIGH (ref 70–99)
GLUCOSE BLDC GLUCOMTR-MCNC: 171 MG/DL — HIGH (ref 70–99)
GLUCOSE BLDC GLUCOMTR-MCNC: 187 MG/DL — HIGH (ref 70–99)
GLUCOSE SERPL-MCNC: 138 MG/DL — HIGH (ref 70–99)
HCT VFR BLD CALC: 30.2 % — LOW (ref 42–52)
HGB BLD-MCNC: 9.6 G/DL — LOW (ref 14–18)
MAGNESIUM SERPL-MCNC: 2 MG/DL — SIGNIFICANT CHANGE UP (ref 1.8–2.4)
MCHC RBC-ENTMCNC: 29.8 PG — SIGNIFICANT CHANGE UP (ref 27–31)
MCHC RBC-ENTMCNC: 31.8 G/DL — LOW (ref 32–37)
MCV RBC AUTO: 93.8 FL — SIGNIFICANT CHANGE UP (ref 80–94)
NRBC # BLD: 0 /100 WBCS — SIGNIFICANT CHANGE UP (ref 0–0)
PLATELET # BLD AUTO: 181 K/UL — SIGNIFICANT CHANGE UP (ref 130–400)
POTASSIUM SERPL-MCNC: 4.5 MMOL/L — SIGNIFICANT CHANGE UP (ref 3.5–5)
POTASSIUM SERPL-SCNC: 4.5 MMOL/L — SIGNIFICANT CHANGE UP (ref 3.5–5)
PROT SERPL-MCNC: 6.8 G/DL — SIGNIFICANT CHANGE UP (ref 6–8)
PTH-INTACT FLD-MCNC: 312 PG/ML — HIGH (ref 15–65)
RBC # BLD: 3.22 M/UL — LOW (ref 4.7–6.1)
RBC # FLD: 14.7 % — HIGH (ref 11.5–14.5)
SODIUM SERPL-SCNC: 136 MMOL/L — SIGNIFICANT CHANGE UP (ref 135–146)
WBC # BLD: 8.37 K/UL — SIGNIFICANT CHANGE UP (ref 4.8–10.8)
WBC # FLD AUTO: 8.37 K/UL — SIGNIFICANT CHANGE UP (ref 4.8–10.8)

## 2022-02-26 PROCEDURE — 99232 SBSQ HOSP IP/OBS MODERATE 35: CPT

## 2022-02-26 RX ADMIN — BUMETANIDE 2 MILLIGRAM(S): 0.25 INJECTION INTRAMUSCULAR; INTRAVENOUS at 17:25

## 2022-02-26 RX ADMIN — Medication 3 UNIT(S): at 11:44

## 2022-02-26 RX ADMIN — Medication 3 UNIT(S): at 08:10

## 2022-02-26 RX ADMIN — Medication 5 MILLIGRAM(S): at 21:01

## 2022-02-26 RX ADMIN — BUMETANIDE 2 MILLIGRAM(S): 0.25 INJECTION INTRAMUSCULAR; INTRAVENOUS at 05:26

## 2022-02-26 RX ADMIN — SEVELAMER CARBONATE 800 MILLIGRAM(S): 2400 POWDER, FOR SUSPENSION ORAL at 11:44

## 2022-02-26 RX ADMIN — Medication 1: at 08:10

## 2022-02-26 RX ADMIN — CLOPIDOGREL BISULFATE 75 MILLIGRAM(S): 75 TABLET, FILM COATED ORAL at 11:44

## 2022-02-26 RX ADMIN — Medication 60 MILLIGRAM(S): at 05:25

## 2022-02-26 RX ADMIN — Medication 1 APPLICATION(S): at 11:41

## 2022-02-26 RX ADMIN — Medication 1: at 17:26

## 2022-02-26 RX ADMIN — Medication 50 MILLIGRAM(S): at 05:26

## 2022-02-26 RX ADMIN — HEPARIN SODIUM 5000 UNIT(S): 5000 INJECTION INTRAVENOUS; SUBCUTANEOUS at 05:26

## 2022-02-26 RX ADMIN — Medication 3 UNIT(S): at 17:27

## 2022-02-26 RX ADMIN — Medication 1: at 11:44

## 2022-02-26 RX ADMIN — Medication 81 MILLIGRAM(S): at 11:44

## 2022-02-26 RX ADMIN — SEVELAMER CARBONATE 800 MILLIGRAM(S): 2400 POWDER, FOR SUSPENSION ORAL at 17:25

## 2022-02-26 RX ADMIN — ATORVASTATIN CALCIUM 40 MILLIGRAM(S): 80 TABLET, FILM COATED ORAL at 21:01

## 2022-02-26 RX ADMIN — PANTOPRAZOLE SODIUM 40 MILLIGRAM(S): 20 TABLET, DELAYED RELEASE ORAL at 05:25

## 2022-02-26 RX ADMIN — HEPARIN SODIUM 5000 UNIT(S): 5000 INJECTION INTRAVENOUS; SUBCUTANEOUS at 17:25

## 2022-02-26 RX ADMIN — SEVELAMER CARBONATE 800 MILLIGRAM(S): 2400 POWDER, FOR SUSPENSION ORAL at 08:10

## 2022-02-26 RX ADMIN — Medication 50 MILLIGRAM(S): at 17:25

## 2022-02-26 NOTE — PROGRESS NOTE ADULT - SUBJECTIVE AND OBJECTIVE BOX
Pt seen and examined at bedside.          VITAL SIGNS (Last 24 hrs):  T(C): 35.7 (02-25-22 @ 22:00), Max: 35.7 (02-25-22 @ 21:47)  HR: 78 (02-26-22 @ 05:24) (68 - 78)  BP: 165/78 (02-26-22 @ 05:24) (146/71 - 165/78)  RR: 20 (02-25-22 @ 22:00) (20 - 20)  SpO2: --  Wt(kg): --  Daily     Daily     I&O's Summary    25 Feb 2022 07:01  -  26 Feb 2022 07:00  -------------------------------------------------   IN: 0 mL / OUT: 3000 mL / NET: -3000 mL        PHYSICAL EXAM:  GENERAL: NAD   HEAD:  Atraumatic, Normocephalic  EYES:  conjunctiva and sclera clear  NECK: Supple, No JVD  CHEST/LUNG: Clear to auscultation bilaterally; No wheeze  HEART: Regular rate and rhythm; No murmurs, rubs, or gallops  ABDOMEN: Soft, Nontender, Nondistended; Bowel sounds present  EXTREMITIES:  2+ Peripheral Pulses, No clubbing, cyanosis, or edema  PSYCH: AAOx3  NEUROLOGY: non-focal  SKIN: Foot ulcer     Labs Reviewed  Spoke to patient in regards to abnormal labs.    CBC Full  -  ( 26 Feb 2022 04:30 )  WBC Count : 8.37 K/uL  Hemoglobin : 9.6 g/dL  Hematocrit : 30.2 %  Platelet Count - Automated : 181 K/uL  Mean Cell Volume : 93.8 fL  Mean Cell Hemoglobin : 29.8 pg  Mean Cell Hemoglobin Concentration : 31.8 g/dL  Auto Neutrophil # : x  Auto Lymphocyte # : x  Auto Monocyte # : x  Auto Eosinophil # : x  Auto Basophil # : x  Auto Neutrophil % : x  Auto Lymphocyte % : x  Auto Monocyte % : x  Auto Eosinophil % : x  Auto Basophil % : x    BMP:    02-26 @ 04:30    Blood Urea Nitrogen - 51  Calcium - 7.4  Carbond Dioxide - 27  Chloride - 95  Creatinine - 5.9  Glucose - 138  Potassium - 4.5  Sodium - 136       MEDICATIONS  (STANDING):  aspirin enteric coated 81 milliGRAM(s) Oral daily  atorvastatin 40 milliGRAM(s) Oral at bedtime  buMETAnide 2 milliGRAM(s) Oral <User Schedule>  buMETAnide 2 milliGRAM(s) Oral <User Schedule>  chlorhexidine 4% Liquid 1 Application(s) Topical <User Schedule>  clopidogrel Tablet 75 milliGRAM(s) Oral daily  collagenase Ointment 1 Application(s) Topical daily  dextrose 40% Gel 15 Gram(s) Oral once  dextrose 5%. 1000 milliLiter(s) (50 mL/Hr) IV Continuous <Continuous>  dextrose 5%. 1000 milliLiter(s) (100 mL/Hr) IV Continuous <Continuous>  dextrose 50% Injectable 25 Gram(s) IV Push once  dextrose 50% Injectable 12.5 Gram(s) IV Push once  dextrose 50% Injectable 25 Gram(s) IV Push once  glucagon  Injectable 1 milliGRAM(s) IntraMuscular once  heparin   Injectable 5000 Unit(s) SubCutaneous every 12 hours  insulin lispro (ADMELOG) corrective regimen sliding scale   SubCutaneous three times a day before meals  insulin lispro Injectable (ADMELOG) 3 Unit(s) SubCutaneous three times a day before meals  melatonin 5 milliGRAM(s) Oral at bedtime  metoprolol tartrate 50 milliGRAM(s) Oral two times a day  NIFEdipine XL 60 milliGRAM(s) Oral daily  pantoprazole    Tablet 40 milliGRAM(s) Oral before breakfast  sevelamer carbonate 800 milliGRAM(s) Oral three times a day with meals    MEDICATIONS  (PRN):

## 2022-02-26 NOTE — PHYSICAL THERAPY INITIAL EVALUATION ADULT - PERTINENT HX OF CURRENT PROBLEM, REHAB EVAL
sent in from o/p dialysis (prior to starting HD) because he appeared pale. Pt does endorse some lethargy when he arrived at dialysis center

## 2022-02-26 NOTE — PROGRESS NOTE ADULT - ASSESSMENT
# ESRD on HD   # Hypervolemia  # HTN  # Normocytic anemia / CKD  # MBD    Recommendations:  -  sp HD yesterday next ttt on Monday   - pt is non-oliguric, continue bumex  - phos level noted , on sevelamer  - BP noted keep current   - appreciate vascular eval and follow up for angiogram on Monday morning   - will follow

## 2022-02-26 NOTE — PROGRESS NOTE ADULT - SUBJECTIVE AND OBJECTIVE BOX
Nephrology progress note    THIS IS AN INCOMPLETE NOTE . FULL NOTE TO FOLLOW SHORTLY    Patient is seen and examined, events over the last 24 h noted .    Allergies:  No Known Allergies    Hospital Medications:   MEDICATIONS  (STANDING):  aspirin enteric coated 81 milliGRAM(s) Oral daily  atorvastatin 40 milliGRAM(s) Oral at bedtime  buMETAnide 2 milliGRAM(s) Oral <User Schedule>  buMETAnide 2 milliGRAM(s) Oral <User Schedule>  chlorhexidine 4% Liquid 1 Application(s) Topical <User Schedule>  clopidogrel Tablet 75 milliGRAM(s) Oral daily  collagenase Ointment 1 Application(s) Topical daily  dextrose 40% Gel 15 Gram(s) Oral once  dextrose 5%. 1000 milliLiter(s) (50 mL/Hr) IV Continuous <Continuous>  dextrose 5%. 1000 milliLiter(s) (100 mL/Hr) IV Continuous <Continuous>  dextrose 50% Injectable 25 Gram(s) IV Push once  dextrose 50% Injectable 12.5 Gram(s) IV Push once  dextrose 50% Injectable 25 Gram(s) IV Push once  glucagon  Injectable 1 milliGRAM(s) IntraMuscular once  heparin   Injectable 5000 Unit(s) SubCutaneous every 12 hours  insulin lispro (ADMELOG) corrective regimen sliding scale   SubCutaneous three times a day before meals  insulin lispro Injectable (ADMELOG) 3 Unit(s) SubCutaneous three times a day before meals  melatonin 5 milliGRAM(s) Oral at bedtime  metoprolol tartrate 50 milliGRAM(s) Oral two times a day  NIFEdipine XL 60 milliGRAM(s) Oral daily  pantoprazole    Tablet 40 milliGRAM(s) Oral before breakfast  sevelamer carbonate 800 milliGRAM(s) Oral three times a day with meals        VITALS:  T(F): 96.2 (02-25-22 @ 22:00), Max: 97.4 (02-25-22 @ 14:05)  HR: 78 (02-26-22 @ 05:24)  BP: 165/78 (02-26-22 @ 05:24)  RR: 20 (02-25-22 @ 22:00)  SpO2: --  Wt(kg): --    02-25 @ 07:01  -  02-26 @ 07:00  --------------------------------------------------------  IN: 0 mL / OUT: 3000 mL / NET: -3000 mL          PHYSICAL EXAM:  Constitutional: NAD  HEENT: anicteric sclera, oropharynx clear, MMM  Neck: No JVD  Respiratory: CTAB, no wheezes, rales or rhonchi  Cardiovascular: S1, S2, RRR  Gastrointestinal: BS+, soft, NT/ND  Extremities: No cyanosis or clubbing. No peripheral edema  :  No schneider.   Skin: No rashes    LABS:  02-25    135  |  94<L>  |  62<HH>  ----------------------------<  153<H>  4.9   |  23  |  7.0<HH>    Ca    7.4<L>      25 Feb 2022 07:00  Mg     2.0     02-25    TPro  7.0  /  Alb  3.6  /  TBili  0.3  /  DBili      /  AST  14  /  ALT  16  /  AlkPhos  139<H>  02-25                          10.1   10.68 )-----------( 200      ( 25 Feb 2022 07:00 )             32.1       Urine Studies:      RADIOLOGY & ADDITIONAL STUDIES:   Nephrology progress note  Patient is seen and examined, events over the last 24 h noted .  denied any complaints   Allergies:  No Known Allergies    Hospital Medications:   MEDICATIONS  (STANDING):  aspirin enteric coated 81 milliGRAM(s) Oral daily  atorvastatin 40 milliGRAM(s) Oral at bedtime  buMETAnide 2 milliGRAM(s) Oral <User Schedule>  buMETAnide 2 milliGRAM(s) Oral <User Schedule>  chlorhexidine 4% Liquid 1 Application(s) Topical <User Schedule>  clopidogrel Tablet 75 milliGRAM(s) Oral daily  collagenase Ointment 1 Application(s) Topical daily  glucagon  Injectable 1 milliGRAM(s) IntraMuscular once  heparin   Injectable 5000 Unit(s) SubCutaneous every 12 hours  insulin lispro (ADMELOG) corrective regimen sliding scale   SubCutaneous three times a day before meals  insulin lispro Injectable (ADMELOG) 3 Unit(s) SubCutaneous three times a day before meals  melatonin 5 milliGRAM(s) Oral at bedtime  metoprolol tartrate 50 milliGRAM(s) Oral two times a day  NIFEdipine XL 60 milliGRAM(s) Oral daily  pantoprazole    Tablet 40 milliGRAM(s) Oral before breakfast  sevelamer carbonate 800 milliGRAM(s) Oral three times a day with meals        VITALS:  T(F): 96.2 (02-25-22 @ 22:00), Max: 97.4 (02-25-22 @ 14:05)  HR: 78 (02-26-22 @ 05:24)  BP: 165/78 (02-26-22 @ 05:24)  RR: 20 (02-25-22 @ 22:00)      02-25 @ 07:01  -  02-26 @ 07:00  --------------------------------------------------------  IN: 0 mL / OUT: 3000 mL / NET: -3000 mL          PHYSICAL EXAM:  Constitutional: NAD  Neck: No JVD  Respiratory: CTAB, no wheezes, rales or rhonchi  Cardiovascular: S1, S2, RRR  Gastrointestinal: BS+, soft, NT/ND  Extremities: No cyanosis or clubbing. plus oen edema   :  No schneider.   Skin: No rashes    LABS:  02-26    136  |  95<L>  |  51<H>  ----------------------------<  138<H>  4.5   |  27  |  5.9<HH>    Ca    7.4<L>      26 Feb 2022 04:30  Mg     2.0     02-26    TPro  6.8  /  Alb  3.6  /  TBili  0.3  /  DBili  x   /  AST  13  /  ALT  14  /  AlkPhos  137<H>  02-26 02-25    135  |  94<L>  |  62<HH>  ----------------------------<  153<H>  4.9   |  23  |  7.0<HH>    Ca    7.4<L>      25 Feb 2022 07:00  Mg     2.0     02-25    TPro  7.0  /  Alb  3.6  /  TBili  0.3  /  DBili      /  AST  14  /  ALT  16  /  AlkPhos  139<H>  02-25                          10.1   10.68 )-----------( 200      ( 25 Feb 2022 07:00 )             32.1       Urine Studies:      RADIOLOGY & ADDITIONAL STUDIES:

## 2022-02-26 NOTE — PHYSICAL THERAPY INITIAL EVALUATION ADULT - REHAB POTENTIAL, PT EVAL
No need for acute skilled PT. D/C PT at this time. Pt aware and in agreement. Reconsult if necessary.

## 2022-02-26 NOTE — PROGRESS NOTE ADULT - SUBJECTIVE AND OBJECTIVE BOX
Podiatry Progress Note    Subjective:   SCHWABACHER, LAWRENCE is a pleasant well-nourished, well developed 61y Male in no acute distress, alert awake, and oriented to person, place and time.  Patient is a 61y old  Male who presents with a chief complaint of right heel ulcer (24 Feb 2022 13:18). Pt seen & evaluated at bedside. Denies N/V/F/C/SOB/pain. No new pedal complaints.      PAST MEDICAL & SURGICAL HISTORY:  CAD (coronary artery disease)    S/P CABG (coronary artery bypass graft)  x4    Diabetes mellitus    Transient ischemic attack (TIA)  2017; 2008    Chronic kidney disease (CKD)  Stage IV    Hypertension    Stented coronary artery  in 2008    Myocardial infarction  2012    PAD (peripheral artery disease)  S/p bypass left leg    HLD (hyperlipidemia)    BPH (benign prostatic hyperplasia)    Pain in left knee  s/p fall    OA (osteoarthritis)    [place] (hard of hearing)    Chronic anemia    S/P CABG (coronary artery bypass graft)  2012    H/O arterial bypass of lower limb  Left Lower Extremity (2016)    History of surgery  Left CEA (2017)  Left Pinkie toe Amputation (2014)  CABG x 4 (2012)  Card cath - stent (2008)           Objective:  Vital Signs Last 24 Hrs  T(C): 35.7 (25 Feb 2022 22:00), Max: 36.3 (25 Feb 2022 14:05)  T(F): 96.2 (25 Feb 2022 22:00), Max: 97.4 (25 Feb 2022 14:05)  HR: 78 (26 Feb 2022 05:24) (65 - 78)  BP: 165/78 (26 Feb 2022 05:24) (140/85 - 165/78)  BP(mean): --  RR: 20 (25 Feb 2022 22:00) (18 - 20)  SpO2: --                        9.6    8.37  )-----------( 181      ( 26 Feb 2022 04:30 )             30.2                 02-26    136  |  95<L>  |  51<H>  ----------------------------<  138<H>  4.5   |  27  |  5.9<HH>    Ca    7.4<L>      26 Feb 2022 04:30  Mg     2.0     02-26    TPro  6.8  /  Alb  3.6  /  TBili  0.3  /  DBili  x   /  AST  13  /  ALT  14  /  AlkPhos  137<H>  02-26    ---------        ACC: [id number] EXAM: MR ANKLE RT    PROCEDURE DATE: 02/24/2022        INTERPRETATION: Clinical History / Reason for exam: History of end-stage regional disease, right foot ulcer    TECHNIQUE: Multiplanar multisequence MRI of the right ankle without intravenous contrast was performed.    COMPARISON: Right ankle radiographs February 22, 2022    FINDINGS:    There is posterior heel ulcer with subcutaneous edema without drainable fluid collection on this noncontrast exam. There is confluent T1 hypointense marrow signal with corresponding bone marrow edema involving the posterior medial calcaneal tuberosity, measuring approximately 1 cm in the AP dimension, consistent with osteomyelitis.    The remainder bone marrow signal is within normal limits.    There is small posterior subtalar joint effusion.    Please note that this exam is not tailored to evaluate intrinsic ankle structures such as the ligaments and tendons. However, there is distal Achilles tendon thickening with increased signal, consistent with tendinosis.    Intrinsic muscles of the foot demonstrate edema and scattered areas of fatty atrophy, consistent with neuropathy.    There are degenerative changes of the hindfoot and midfoot joints.    IMPRESSION:    Posterior heel ulcer with subcutaneous edema, without drainable fluid collection.    Osteomyelitis involving the posteromedial calcaneal tuberosity, as above.    --- End of Report ---      SHILPA NAYLOR MD; Attending Radiologist  This document has been electronically signed. Feb 25 2022 9:37AM    -----------    Physical Exam - Lower Extremity Focused:   #Right Lower Extremity  Derm:   Open Right Plantar Heel Ulcer   Wound Probes to Soft Tissue Bone w/ Minimal Drainage; Wound Base Fibrotic. Wound margins well demarcated. Periwound: wound margins well demarcated. Minimal erythema.   Foot is warm, capillary refill is instant to the toes   Mild mottling of skin to foot & distal leg    Vascular: DP and PT Pulses Diminished; Foot is Warm to Warm to the touch   Neuro: Protective Sensation Diminished / Moderately Neuropathic   MSK: No Pain On Palpation at Wound Site     Assessment:  Right Plantar Heel Ulcer - Probes to Soft Tissue    Plan:  Chart reviewed and Patient evaluated. All Questions and Concerns Addressed and Answered  Discussed diagnosis and treatment with patient  Wound Flushed w/ NS; Wound Dressed w/ Santyl, Packed w/ Saline-Soaked Gauze / DSD / Kerlix   Local Wound Care; As Stated Above   ESR 15, CRP 16 (2/22/22)  Wound Culture Obtained; Sent to Pathology Lab; will f/u results  XR Imaging Right Foot; Consider MRI of the ankle for further evaluation if clinically warranted.   MRI-Right Ankle; See report above; OM posteromedial calcaneal tuberosity  Lower Extremity Arterial Duplex B/L; Left-patent bypass with no evidence of stenosis is normal flow in distal anterior tibial artery. Minimal flow in negative posterior tibial and peroneal arteries. Vascular Consult appreciated; RLE angiogram Monday 2/28    Sx Scheduled for Tuesday 3/1@ 12:30pm  Excisional debridement of soft tissue and bone right foot.  Request Medical Clearance prior to OR.   Request T&S and COAG studies 2/28  Please make pt NPO MN 2/28    Discussed Plan w/ Attending    Podiatry

## 2022-02-26 NOTE — PROGRESS NOTE ADULT - ASSESSMENT
60 yo M with PMHx of ESRD on HD M/W/F, Normocytic Anemia of Chronic Disease, DM Type II, Hypertension, DLD, TIA, CAD s/p staged PCI CABG 2012, PVD s/p left below knee popliteal to AT artery reverse saphenous vein graft in 4/2017 and balloon plasty of his left lower extremity bypass graft in 6/2020, and carotid stenosis s/p right carotid endarterectomy in 6/2017 sent in from o/p dialysis. Admitted to inpatient for dyspnea 2/2 fluid overload, improved.       # ESRD on HD MWF   # Fluid Overload - resolved   - s/p HD sessions   - c/w PO Bumex  - continue nifedipine 60mg  - c/w sevelamer TID     # Elevated trops 2/2 CKD   - no chest pain      # PVD   - Arterial duplex showed left-patent bypass with no evidence of stenosis is normal flow in distal anterior tibial artery. Minimal flow in negative posterior tibial and peroneal arteries.   - c/w DAPT, statin   - Angiogram Monday     # Right heel ulcer, Osteomyelitis   - Podiatry recs reviewed - Debridement Tuesday   - ID consult - hold abx     # Anemia chronic disease - stable     # DM2  - c/w ISS   - c/w 3 units Humalog TIDAC   - c/w Lantus 8 units at bedtime     # DLD  - c/w statin    # CAD s/p staged PCI CABG 2012  # PVD s/p left below knee popliteal to AT artery reverse saphenous vein graft in 4/2017 and balloon plasty of his left lower extremity bypass graft in 6/2020 # Carotid stenosis s/p right carotid endarterectomy in 6/ 2017  - c/w asa 81mg, Plavix, and statin     DVT ppx: heparin SC     Pending: angiogram Monday, Debridement Tuesday

## 2022-02-27 LAB
ALBUMIN SERPL ELPH-MCNC: 3.8 G/DL — SIGNIFICANT CHANGE UP (ref 3.5–5.2)
ALP SERPL-CCNC: 133 U/L — HIGH (ref 30–115)
ALT FLD-CCNC: 13 U/L — SIGNIFICANT CHANGE UP (ref 0–41)
ANION GAP SERPL CALC-SCNC: 17 MMOL/L — HIGH (ref 7–14)
ANION GAP SERPL CALC-SCNC: 20 MMOL/L — HIGH (ref 7–14)
APTT BLD: 31.4 SEC — SIGNIFICANT CHANGE UP (ref 27–39.2)
AST SERPL-CCNC: 13 U/L — SIGNIFICANT CHANGE UP (ref 0–41)
BILIRUB SERPL-MCNC: 0.4 MG/DL — SIGNIFICANT CHANGE UP (ref 0.2–1.2)
BLD GP AB SCN SERPL QL: SIGNIFICANT CHANGE UP
BUN SERPL-MCNC: 66 MG/DL — CRITICAL HIGH (ref 10–20)
BUN SERPL-MCNC: 75 MG/DL — CRITICAL HIGH (ref 10–20)
CALCIUM SERPL-MCNC: 6.8 MG/DL — LOW (ref 8.5–10.1)
CALCIUM SERPL-MCNC: 7.4 MG/DL — LOW (ref 8.5–10.1)
CHLORIDE SERPL-SCNC: 95 MMOL/L — LOW (ref 98–110)
CHLORIDE SERPL-SCNC: 96 MMOL/L — LOW (ref 98–110)
CO2 SERPL-SCNC: 20 MMOL/L — SIGNIFICANT CHANGE UP (ref 17–32)
CO2 SERPL-SCNC: 23 MMOL/L — SIGNIFICANT CHANGE UP (ref 17–32)
CREAT SERPL-MCNC: 6.6 MG/DL — CRITICAL HIGH (ref 0.7–1.5)
CREAT SERPL-MCNC: 7.8 MG/DL — CRITICAL HIGH (ref 0.7–1.5)
GLUCOSE BLDC GLUCOMTR-MCNC: 140 MG/DL — HIGH (ref 70–99)
GLUCOSE BLDC GLUCOMTR-MCNC: 143 MG/DL — HIGH (ref 70–99)
GLUCOSE BLDC GLUCOMTR-MCNC: 181 MG/DL — HIGH (ref 70–99)
GLUCOSE BLDC GLUCOMTR-MCNC: 242 MG/DL — HIGH (ref 70–99)
GLUCOSE SERPL-MCNC: 126 MG/DL — HIGH (ref 70–99)
GLUCOSE SERPL-MCNC: 162 MG/DL — HIGH (ref 70–99)
HCT VFR BLD CALC: 29 % — LOW (ref 42–52)
HCT VFR BLD CALC: 31.1 % — LOW (ref 42–52)
HGB BLD-MCNC: 9.2 G/DL — LOW (ref 14–18)
HGB BLD-MCNC: 9.8 G/DL — LOW (ref 14–18)
INR BLD: 1.14 RATIO — SIGNIFICANT CHANGE UP (ref 0.65–1.3)
MAGNESIUM SERPL-MCNC: 2.2 MG/DL — SIGNIFICANT CHANGE UP (ref 1.8–2.4)
MAGNESIUM SERPL-MCNC: 2.2 MG/DL — SIGNIFICANT CHANGE UP (ref 1.8–2.4)
MCHC RBC-ENTMCNC: 29.7 PG — SIGNIFICANT CHANGE UP (ref 27–31)
MCHC RBC-ENTMCNC: 30 PG — SIGNIFICANT CHANGE UP (ref 27–31)
MCHC RBC-ENTMCNC: 31.5 G/DL — LOW (ref 32–37)
MCHC RBC-ENTMCNC: 31.7 G/DL — LOW (ref 32–37)
MCV RBC AUTO: 93.5 FL — SIGNIFICANT CHANGE UP (ref 80–94)
MCV RBC AUTO: 95.1 FL — HIGH (ref 80–94)
NRBC # BLD: 0 /100 WBCS — SIGNIFICANT CHANGE UP (ref 0–0)
NRBC # BLD: 0 /100 WBCS — SIGNIFICANT CHANGE UP (ref 0–0)
PLATELET # BLD AUTO: 182 K/UL — SIGNIFICANT CHANGE UP (ref 130–400)
PLATELET # BLD AUTO: 201 K/UL — SIGNIFICANT CHANGE UP (ref 130–400)
POTASSIUM SERPL-MCNC: 4.8 MMOL/L — SIGNIFICANT CHANGE UP (ref 3.5–5)
POTASSIUM SERPL-MCNC: 4.9 MMOL/L — SIGNIFICANT CHANGE UP (ref 3.5–5)
POTASSIUM SERPL-SCNC: 4.8 MMOL/L — SIGNIFICANT CHANGE UP (ref 3.5–5)
POTASSIUM SERPL-SCNC: 4.9 MMOL/L — SIGNIFICANT CHANGE UP (ref 3.5–5)
PROT SERPL-MCNC: 6.9 G/DL — SIGNIFICANT CHANGE UP (ref 6–8)
PROTHROM AB SERPL-ACNC: 13.1 SEC — HIGH (ref 9.95–12.87)
RBC # BLD: 3.1 M/UL — LOW (ref 4.7–6.1)
RBC # BLD: 3.27 M/UL — LOW (ref 4.7–6.1)
RBC # FLD: 14.7 % — HIGH (ref 11.5–14.5)
RBC # FLD: 14.8 % — HIGH (ref 11.5–14.5)
SARS-COV-2 RNA SPEC QL NAA+PROBE: SIGNIFICANT CHANGE UP
SODIUM SERPL-SCNC: 135 MMOL/L — SIGNIFICANT CHANGE UP (ref 135–146)
SODIUM SERPL-SCNC: 136 MMOL/L — SIGNIFICANT CHANGE UP (ref 135–146)
WBC # BLD: 8.28 K/UL — SIGNIFICANT CHANGE UP (ref 4.8–10.8)
WBC # BLD: 8.93 K/UL — SIGNIFICANT CHANGE UP (ref 4.8–10.8)
WBC # FLD AUTO: 8.28 K/UL — SIGNIFICANT CHANGE UP (ref 4.8–10.8)
WBC # FLD AUTO: 8.93 K/UL — SIGNIFICANT CHANGE UP (ref 4.8–10.8)

## 2022-02-27 PROCEDURE — 99232 SBSQ HOSP IP/OBS MODERATE 35: CPT

## 2022-02-27 RX ORDER — SODIUM CHLORIDE 9 MG/ML
1000 INJECTION, SOLUTION INTRAVENOUS
Refills: 0 | Status: DISCONTINUED | OUTPATIENT
Start: 2022-02-27 | End: 2022-02-28

## 2022-02-27 RX ADMIN — HEPARIN SODIUM 5000 UNIT(S): 5000 INJECTION INTRAVENOUS; SUBCUTANEOUS at 05:02

## 2022-02-27 RX ADMIN — Medication 5 MILLIGRAM(S): at 21:11

## 2022-02-27 RX ADMIN — CLOPIDOGREL BISULFATE 75 MILLIGRAM(S): 75 TABLET, FILM COATED ORAL at 11:38

## 2022-02-27 RX ADMIN — ATORVASTATIN CALCIUM 40 MILLIGRAM(S): 80 TABLET, FILM COATED ORAL at 21:11

## 2022-02-27 RX ADMIN — BUMETANIDE 2 MILLIGRAM(S): 0.25 INJECTION INTRAMUSCULAR; INTRAVENOUS at 05:03

## 2022-02-27 RX ADMIN — Medication 50 MILLIGRAM(S): at 05:02

## 2022-02-27 RX ADMIN — SEVELAMER CARBONATE 800 MILLIGRAM(S): 2400 POWDER, FOR SUSPENSION ORAL at 08:01

## 2022-02-27 RX ADMIN — Medication 50 MILLIGRAM(S): at 17:55

## 2022-02-27 RX ADMIN — BUMETANIDE 2 MILLIGRAM(S): 0.25 INJECTION INTRAMUSCULAR; INTRAVENOUS at 17:54

## 2022-02-27 RX ADMIN — Medication 3 UNIT(S): at 11:38

## 2022-02-27 RX ADMIN — Medication 81 MILLIGRAM(S): at 11:38

## 2022-02-27 RX ADMIN — Medication 3 UNIT(S): at 17:54

## 2022-02-27 RX ADMIN — HEPARIN SODIUM 5000 UNIT(S): 5000 INJECTION INTRAVENOUS; SUBCUTANEOUS at 17:55

## 2022-02-27 RX ADMIN — Medication 3 UNIT(S): at 08:01

## 2022-02-27 RX ADMIN — Medication 1 APPLICATION(S): at 11:39

## 2022-02-27 RX ADMIN — SEVELAMER CARBONATE 800 MILLIGRAM(S): 2400 POWDER, FOR SUSPENSION ORAL at 17:55

## 2022-02-27 RX ADMIN — Medication 1: at 17:53

## 2022-02-27 RX ADMIN — Medication 2: at 11:37

## 2022-02-27 RX ADMIN — Medication 60 MILLIGRAM(S): at 05:02

## 2022-02-27 RX ADMIN — SEVELAMER CARBONATE 800 MILLIGRAM(S): 2400 POWDER, FOR SUSPENSION ORAL at 11:38

## 2022-02-27 RX ADMIN — PANTOPRAZOLE SODIUM 40 MILLIGRAM(S): 20 TABLET, DELAYED RELEASE ORAL at 05:02

## 2022-02-27 NOTE — PROGRESS NOTE ADULT - SUBJECTIVE AND OBJECTIVE BOX
Podiatry Progress Note    Subjective:   SCHWABACHER, LAWRENCE is a pleasant well-nourished, well developed 61y Male in no acute distress, alert awake, and oriented to person, place and time.  Patient is a 61y old  Male who presents with a chief complaint of right heel ulcer (24 Feb 2022 13:18). Pt seen & evaluated at bedside. Pt resting comfortably. No new pedal complaints.      PAST MEDICAL & SURGICAL HISTORY:  CAD (coronary artery disease)    S/P CABG (coronary artery bypass graft)  x4    Diabetes mellitus    Transient ischemic attack (TIA)  2017; 2008    Chronic kidney disease (CKD)  Stage IV    Hypertension    Stented coronary artery  in 2008    Myocardial infarction  2012    PAD (peripheral artery disease)  S/p bypass left leg    HLD (hyperlipidemia)    BPH (benign prostatic hyperplasia)    Pain in left knee  s/p fall    OA (osteoarthritis)    [place] (hard of hearing)    Chronic anemia    S/P CABG (coronary artery bypass graft)  2012    H/O arterial bypass of lower limb  Left Lower Extremity (2016)    History of surgery  Left CEA (2017)  Left Pinkie toe Amputation (2014)  CABG x 4 (2012)  Card cath - stent (2008)           Objective:  Vital Signs Last 24 Hrs  T(C): 37 (27 Feb 2022 05:54), Max: 37 (27 Feb 2022 05:54)  T(F): 98.6 (27 Feb 2022 05:54), Max: 98.6 (27 Feb 2022 05:54)  HR: 78 (27 Feb 2022 05:54) (66 - 78)  BP: 142/69 (27 Feb 2022 05:54) (137/67 - 148/72)  BP(mean): --  RR: 17 (27 Feb 2022 05:54) (17 - 19)  SpO2: --                        9.8    8.93  )-----------( 201      ( 27 Feb 2022 04:30 )             31.1                 02-26    136  |  95<L>  |  51<H>  ----------------------------<  138<H>  4.5   |  27  |  5.9<HH>    Ca    7.4<L>      26 Feb 2022 04:30  Mg     2.0     02-26    TPro  6.8  /  Alb  3.6  /  TBili  0.3  /  DBili  x   /  AST  13  /  ALT  14  /  AlkPhos  137<H>  02-26          Physical Exam - Lower Extremity Focused:   #Right Lower Extremity  Derm:   Open Right Plantar Heel Ulcer   Wound Probes to Soft Tissue Bone w/ Minimal Drainage; Wound Base Fibrotic. Wound margins well demarcated. Periwound: wound margins well demarcated. Minimal erythema.   Foot is warm, capillary refill is instant to the toes   Mild mottling of skin to foot & distal leg    Vascular: DP and PT Pulses Diminished; Foot is Warm to Warm to the touch   Neuro: Protective Sensation Diminished / Moderately Neuropathic   MSK: No Pain On Palpation at Wound Site     Assessment:  Right Plantar Heel Ulcer - Probes to Soft Tissue    Plan:  Chart reviewed and Patient evaluated. All Questions and Concerns Addressed and Answered  Discussed diagnosis and treatment with patient  Wound Flushed w/ NS; Wound Dressed w/ Santyl, Packed w/ Saline-Soaked Gauze / DSD / Kerlix   Local Wound Care; As Stated Above   ESR 15, CRP 16 (2/22/22)  Wound Culture Obtained; Sent to Pathology Lab; will f/u results  XR Imaging Right Foot; Consider MRI of the ankle for further evaluation if clinically warranted.   MRI-Right Ankle; OM posteromedial calcaneal tuberosity  Lower Extremity Arterial Duplex B/L; Left-patent bypass with no evidence of stenosis is normal flow in distal anterior tibial artery. Minimal flow in negative posterior tibial and peroneal arteries. Vascular; RLE angiogram Monday 2/28    Sx Scheduled for Tuesday 3/1@ 12:30pm  Excisional debridement of soft tissue and bone right foot.  Request Medical Clearance prior to OR.   Request T&S and COAG studies 2/28  Please make pt NPO MN 2/28    Discussed Plan w/ Attending    Podiatry

## 2022-02-27 NOTE — PROGRESS NOTE ADULT - SUBJECTIVE AND OBJECTIVE BOX
LAWRENCE SCHWABACHER 61y Male  MRN#: [id number]   CODE STATUS: full code    Hospital Day: 6d    Pt is currently admitted with the primary diagnosis of fluid overload    SUBJECTIVE  Hospital Course    Overnight events: No acute events overnight. Patient is comfortable. Has no subjective complaints. Understands plan of action regarding his foot.     Subjective complaints                                                 ----------------------------------------------------------  OBJECTIVE  PAST MEDICAL & SURGICAL HISTORY  CAD (coronary artery disease)    S/P CABG (coronary artery bypass graft)  x4    Diabetes mellitus    Transient ischemic attack (TIA)  2017; 2008    Chronic kidney disease (CKD)  Stage IV    Hypertension    Stented coronary artery  in 2008    Myocardial infarction  2012    PAD (peripheral artery disease)  S/p bypass left leg    HLD (hyperlipidemia)    BPH (benign prostatic hyperplasia)    Pain in left knee  s/p fall    OA (osteoarthritis)    [place] (hard of hearing)    Chronic anemia    S/P CABG (coronary artery bypass graft)  2012    H/O arterial bypass of lower limb  Left Lower Extremity (2016)    History of surgery  Left CEA (2017)  Left Pinkie toe Amputation (2014)  CABG x 4 (2012)  Card cath - stent (2008)                                                -----------------------------------------------------------  ALLERGIES:  No Known Allergies                                            ------------------------------------------------------------    HOME MEDICATIONS  Home Medications:  aspirin 81 mg oral tablet: 1 tab(s) orally once a day (21 Feb 2022 13:33)  furosemide 80 mg oral tablet: 1 tab(s) orally 2 times a day (21 Feb 2022 13:33)  Levemir 100 units/mL subcutaneous solution: 40 unit(s) subcutaneous once a day (at bedtime).  (21 Feb 2022 13:33)  Metoprolol Tartrate 50 mg oral tablet: 1 tab(s) orally 2 times a day (21 Feb 2022 13:33)  Plavix 75 mg oral tablet: 1 tab(s) orally once a day (21 Feb 2022 13:33)  Trulicity Pen: 1 milligram(s) subcutaneous once a week (21 Feb 2022 13:33)                           MEDICATIONS:  STANDING MEDICATIONS  aspirin enteric coated 81 milliGRAM(s) Oral daily  atorvastatin 40 milliGRAM(s) Oral at bedtime  buMETAnide 2 milliGRAM(s) Oral <User Schedule>  buMETAnide 2 milliGRAM(s) Oral <User Schedule>  chlorhexidine 4% Liquid 1 Application(s) Topical <User Schedule>  clopidogrel Tablet 75 milliGRAM(s) Oral daily  collagenase Ointment 1 Application(s) Topical daily  dextrose 40% Gel 15 Gram(s) Oral once  dextrose 5%. 1000 milliLiter(s) IV Continuous <Continuous>  dextrose 5%. 1000 milliLiter(s) IV Continuous <Continuous>  dextrose 50% Injectable 25 Gram(s) IV Push once  dextrose 50% Injectable 12.5 Gram(s) IV Push once  dextrose 50% Injectable 25 Gram(s) IV Push once  glucagon  Injectable 1 milliGRAM(s) IntraMuscular once  heparin   Injectable 5000 Unit(s) SubCutaneous every 12 hours  insulin lispro (ADMELOG) corrective regimen sliding scale   SubCutaneous three times a day before meals  insulin lispro Injectable (ADMELOG) 3 Unit(s) SubCutaneous three times a day before meals  melatonin 5 milliGRAM(s) Oral at bedtime  metoprolol tartrate 50 milliGRAM(s) Oral two times a day  NIFEdipine XL 60 milliGRAM(s) Oral daily  pantoprazole    Tablet 40 milliGRAM(s) Oral before breakfast  sevelamer carbonate 800 milliGRAM(s) Oral three times a day with meals    PRN MEDICATIONS                                            ------------------------------------------------------------  VITAL SIGNS: Last 24 Hours  T(C): 36.2 (26 Feb 2022 21:09), Max: 36.2 (26 Feb 2022 14:45)  T(F): 97.2 (26 Feb 2022 21:09), Max: 97.2 (26 Feb 2022 21:09)  HR: 66 (26 Feb 2022 21:09) (66 - 78)  BP: 148/72 (26 Feb 2022 21:09) (137/67 - 165/78)  BP(mean): --  RR: 18 (26 Feb 2022 21:09) (18 - 19)  SpO2: --      02-25-22 @ 07:01  -  02-26-22 @ 07:00  --------------------------------------------------------  IN: 0 mL / OUT: 3000 mL / NET: -3000 mL                                             --------------------------------------------------------------  LABS:                        9.6    8.37  )-----------( 181      ( 26 Feb 2022 04:30 )             30.2     02-26    136  |  95<L>  |  51<H>  ----------------------------<  138<H>  4.5   |  27  |  5.9<HH>    Ca    7.4<L>      26 Feb 2022 04:30  Mg     2.0     02-26    TPro  6.8  /  Alb  3.6  /  TBili  0.3  /  DBili  x   /  AST  13  /  ALT  14  /  AlkPhos  137<H>  02-26          Sedimentation Rate, Erythrocyte: 25 mm/Hr *H* (02-26-22 @ 04:30)                                                      -------------------------------------------------------------  RADIOLOGY:                                            --------------------------------------------------------------    PHYSICAL EXAM:  General: Well appearing, not in acute distress  HEENT: No cervical LAD, or JVD  LUNGS: CTAB, no rales, on RA  HEART: RRR, no murmur  ABDOMEN: NBS, soft, nontender to palpation  EXT: b/l LE mild pitting edema 1+  NEURO: AAOx3  SKIN: right wound bandaged                                              --------------------------------------------------------------

## 2022-02-27 NOTE — PROGRESS NOTE ADULT - ASSESSMENT
ASSESSMENT & PLAN    60 yo M with PMHx of ESRD on HD M/W/F, Normocytic Anemia of Chronic Disease, DM Type II, Hypertension, DLD, TIA, CAD s/p staged PCI CABG 2012, PVD s/p left below knee popliteal to AT artery reverse saphenous vein graft in 4/2017 and balloon plasty of his left lower extremity bypass graft in 6/2020, and carotid stenosis s/p right carotid endarterectomy in 6/2017 sent in from o/p dialysis (prior to starting HD) for appearing pale. Admitted to inpatient for dyspnea 2/2 fluid overload, improved, still in hospital admission for podiatry procedure due to right heel ulcer osteomyelitis.     #Right hell ulcer: Osteomyelitis  - Xray ankle reviewed: posterior heel ulcer with no definitive evidence of osteomyelitis- there is mild periosteal reaction and would need MRI ankle for further evaluation  - Duplex venous reviewed: No DVT  - Duplex arterial reviewed: minimal flow in PT and peroneal arteries.   - Podiatry recs reviewed, vascular reviewed, ID recs reviewed. pending Monday angiogram--> per podiatry Tuesday 3/1 will have excisional debridement of soft tissue and bone right foot with wound bx --> after Cx obtained will do vancomycin 1.25 post HD on HD days, PO flagyl 500mg TID, cefepime 2g post HD on HD days    # ESRD on HD MWF, non-oliguric  # Fluid Overload; suspected due to diet/fluid intake+ incorrect use of home lasix; r/o cardiac cause  - did not miss any sessions as o/p; reviewed nephro recs. s/p HD 2 days in a row. will hold HD for AM today  - diet and fluid intake not ideal and seems meds were not being taken properly  - switched from lasix to PO bumex. nephro recs reviewed: continue bumex,continue nifedipine 60mg  - c/w sevelamer TID  - BNP >70K  - TTE 5/2021 with EF 50-55% and grade 1 diastolic dysfunction; f/u repeat echo    #Elevated trops- 0.19: likely 2/2 CKD  - no active chest pain sx  - lower than previous levels- 0.25      # Anemia chronic disease  - On admission Hb 9.9    # DM2  - takes lantus 40 units at night and trulicity weekly  - monitor FS and start insulin regimen if consistently >180    # HTN  - initially elevated in ED  - only on metoprolol 50mg BID at home  - monitor BP after HD    # DLD  - c/w statin    # CAD s/p staged PCI CABG 2012  # PVD s/p left below knee popliteal to AT artery reverse saphenous vein graft in 4/2017 and balloon plasty of his left lower extremity bypass graft in 6/2020 # Carotid stenosis s/p right carotid endarterectomy in 6/ 2017  - c/w asa 81mg, plavix, and BB    #DM2  - on SSI      DVT ppx: heparin SC  GI ppx: NA  Diet: DASH, CC, renal, fluid restricted  Code Status: Full Code  Handoff: pending Monday angiogram ASSESSMENT & PLAN    60 yo M with PMHx of ESRD on HD M/W/F, Normocytic Anemia of Chronic Disease, DM Type II, Hypertension, DLD, TIA, CAD s/p staged PCI CABG 2012, PVD s/p left below knee popliteal to AT artery reverse saphenous vein graft in 4/2017 and balloon plasty of his left lower extremity bypass graft in 6/2020, and carotid stenosis s/p right carotid endarterectomy in 6/2017 sent in from o/p dialysis (prior to starting HD) for appearing pale. Admitted to inpatient for dyspnea 2/2 fluid overload, improved, still in hospital admission for podiatry procedure due to right heel ulcer osteomyelitis.     #Right hell ulcer: Osteomyelitis  - Xray ankle reviewed: posterior heel ulcer with no definitive evidence of osteomyelitis- there is mild periosteal reaction and would need MRI ankle for further evaluation  - Duplex venous reviewed: No DVT  - Duplex arterial reviewed: minimal flow in PT and peroneal arteries.   - Podiatry recs reviewed, vascular reviewed, ID recs reviewed. pending Monday angiogram--> per podiatry Tuesday 3/1 will have excisional debridement of soft tissue and bone right foot with wound bx --> after Cx obtained will do vancomycin 1.25 post HD on HD days, PO flagyl 500mg TID, cefepime 2g post HD on HD days    # ESRD on HD MWF, non-oliguric  # Fluid Overload; suspected due to diet/fluid intake+ incorrect use of home lasix; r/o cardiac cause  - did not miss any sessions as o/p; reviewed nephro recs. s/p HD 2 days in a row. will hold HD for AM today  - diet and fluid intake not ideal and seems meds were not being taken properly  - switched from lasix to PO bumex. nephro recs reviewed: continue bumex,continue nifedipine 60mg  - c/w sevelamer TID  - BNP >70K  - TTE 5/2021 with EF 50-55% and grade 1 diastolic dysfunction; f/u repeat echo    #Elevated trops- 0.19: likely 2/2 CKD  - no active chest pain sx  - lower than previous levels- 0.25      # Anemia chronic disease  - On admission Hb 9.9    # DM2  - takes lantus 40 units at night and trulicity weekly  - monitor FS and start insulin regimen if consistently >180    # HTN  - initially elevated in ED  - only on metoprolol 50mg BID at home  - monitor BP after HD    # DLD  - c/w statin    # CAD s/p staged PCI CABG 2012  # PVD s/p left below knee popliteal to AT artery reverse saphenous vein graft in 4/2017 and balloon plasty of his left lower extremity bypass graft in 6/2020 # Carotid stenosis s/p right carotid endarterectomy in 6/ 2017  - c/w asa 81mg, plavix, and BB    #DM2  - on SSI      DVT ppx: heparin SC  GI ppx: NA  Diet: DASH, CC, renal, fluid restricted  Code Status: Full Code  Handoff: pending Monday angiogram, then debridement, then abx

## 2022-02-28 LAB
-  AMIKACIN: SIGNIFICANT CHANGE UP
-  AMOXICILLIN/CLAVULANIC ACID: SIGNIFICANT CHANGE UP
-  AMPICILLIN/SULBACTAM: SIGNIFICANT CHANGE UP
-  AMPICILLIN: SIGNIFICANT CHANGE UP
-  AMPICILLIN: SIGNIFICANT CHANGE UP
-  AZTREONAM: SIGNIFICANT CHANGE UP
-  CEFAZOLIN: SIGNIFICANT CHANGE UP
-  CEFEPIME: SIGNIFICANT CHANGE UP
-  CEFOXITIN: SIGNIFICANT CHANGE UP
-  CEFTRIAXONE: SIGNIFICANT CHANGE UP
-  CIPROFLOXACIN: SIGNIFICANT CHANGE UP
-  ERTAPENEM: SIGNIFICANT CHANGE UP
-  GENTAMICIN: SIGNIFICANT CHANGE UP
-  IMIPENEM: SIGNIFICANT CHANGE UP
-  LEVOFLOXACIN: SIGNIFICANT CHANGE UP
-  MEROPENEM: SIGNIFICANT CHANGE UP
-  PIPERACILLIN/TAZOBACTAM: SIGNIFICANT CHANGE UP
-  TETRACYCLINE: SIGNIFICANT CHANGE UP
-  TOBRAMYCIN: SIGNIFICANT CHANGE UP
-  TRIMETHOPRIM/SULFAMETHOXAZOLE: SIGNIFICANT CHANGE UP
-  VANCOMYCIN: SIGNIFICANT CHANGE UP
CULTURE RESULTS: SIGNIFICANT CHANGE UP
GLUCOSE BLDC GLUCOMTR-MCNC: 107 MG/DL — HIGH (ref 70–99)
GLUCOSE BLDC GLUCOMTR-MCNC: 207 MG/DL — HIGH (ref 70–99)
METHOD TYPE: SIGNIFICANT CHANGE UP
METHOD TYPE: SIGNIFICANT CHANGE UP
ORGANISM # SPEC MICROSCOPIC CNT: SIGNIFICANT CHANGE UP
PHOSPHATE SERPL-MCNC: 6.4 MG/DL — HIGH (ref 2.1–4.9)
SPECIMEN SOURCE: SIGNIFICANT CHANGE UP

## 2022-02-28 PROCEDURE — 36247 INS CATH ABD/L-EXT ART 3RD: CPT | Mod: 59

## 2022-02-28 PROCEDURE — 75630 X-RAY AORTA LEG ARTERIES: CPT | Mod: 26,59

## 2022-02-28 PROCEDURE — 37229: CPT | Mod: LT

## 2022-02-28 PROCEDURE — 36248 INS CATH ABD/L-EXT ART ADDL: CPT

## 2022-02-28 PROCEDURE — 76937 US GUIDE VASCULAR ACCESS: CPT | Mod: 26

## 2022-02-28 PROCEDURE — 75710 ARTERY X-RAYS ARM/LEG: CPT | Mod: 26,59

## 2022-02-28 PROCEDURE — 99232 SBSQ HOSP IP/OBS MODERATE 35: CPT

## 2022-02-28 PROCEDURE — 37225: CPT | Mod: LT

## 2022-02-28 RX ORDER — METOPROLOL TARTRATE 50 MG
50 TABLET ORAL
Refills: 0 | Status: DISCONTINUED | OUTPATIENT
Start: 2022-02-28 | End: 2022-03-01

## 2022-02-28 RX ORDER — CALCITRIOL 0.5 UG/1
0.25 CAPSULE ORAL DAILY
Refills: 0 | Status: DISCONTINUED | OUTPATIENT
Start: 2022-02-28 | End: 2022-02-28

## 2022-02-28 RX ORDER — DEXTROSE 50 % IN WATER 50 %
12.5 SYRINGE (ML) INTRAVENOUS ONCE
Refills: 0 | Status: DISCONTINUED | OUTPATIENT
Start: 2022-02-28 | End: 2022-03-01

## 2022-02-28 RX ORDER — CLOPIDOGREL BISULFATE 75 MG/1
75 TABLET, FILM COATED ORAL DAILY
Refills: 0 | Status: DISCONTINUED | OUTPATIENT
Start: 2022-02-28 | End: 2022-03-01

## 2022-02-28 RX ORDER — SEVELAMER CARBONATE 2400 MG/1
800 POWDER, FOR SUSPENSION ORAL
Refills: 0 | Status: DISCONTINUED | OUTPATIENT
Start: 2022-02-28 | End: 2022-03-01

## 2022-02-28 RX ORDER — HYDROMORPHONE HYDROCHLORIDE 2 MG/ML
2 INJECTION INTRAMUSCULAR; INTRAVENOUS; SUBCUTANEOUS
Refills: 0 | Status: DISCONTINUED | OUTPATIENT
Start: 2022-02-28 | End: 2022-02-28

## 2022-02-28 RX ORDER — DEXTROSE 50 % IN WATER 50 %
25 SYRINGE (ML) INTRAVENOUS ONCE
Refills: 0 | Status: DISCONTINUED | OUTPATIENT
Start: 2022-02-28 | End: 2022-03-01

## 2022-02-28 RX ORDER — CHLORHEXIDINE GLUCONATE 213 G/1000ML
1 SOLUTION TOPICAL
Refills: 0 | Status: DISCONTINUED | OUTPATIENT
Start: 2022-02-28 | End: 2022-03-01

## 2022-02-28 RX ORDER — BUMETANIDE 0.25 MG/ML
2 INJECTION INTRAMUSCULAR; INTRAVENOUS
Refills: 0 | Status: DISCONTINUED | OUTPATIENT
Start: 2022-02-28 | End: 2022-03-01

## 2022-02-28 RX ORDER — LANOLIN ALCOHOL/MO/W.PET/CERES
5 CREAM (GRAM) TOPICAL AT BEDTIME
Refills: 0 | Status: DISCONTINUED | OUTPATIENT
Start: 2022-02-28 | End: 2022-03-01

## 2022-02-28 RX ORDER — SODIUM CHLORIDE 9 MG/ML
1000 INJECTION, SOLUTION INTRAVENOUS
Refills: 0 | Status: DISCONTINUED | OUTPATIENT
Start: 2022-02-28 | End: 2022-03-01

## 2022-02-28 RX ORDER — INSULIN LISPRO 100/ML
3 VIAL (ML) SUBCUTANEOUS
Refills: 0 | Status: DISCONTINUED | OUTPATIENT
Start: 2022-02-28 | End: 2022-03-01

## 2022-02-28 RX ORDER — HEPARIN SODIUM 5000 [USP'U]/ML
5000 INJECTION INTRAVENOUS; SUBCUTANEOUS EVERY 12 HOURS
Refills: 0 | Status: DISCONTINUED | OUTPATIENT
Start: 2022-02-28 | End: 2022-03-01

## 2022-02-28 RX ORDER — ASPIRIN/CALCIUM CARB/MAGNESIUM 324 MG
325 TABLET ORAL ONCE
Refills: 0 | Status: COMPLETED | OUTPATIENT
Start: 2022-02-28 | End: 2022-02-28

## 2022-02-28 RX ORDER — DEXTROSE 50 % IN WATER 50 %
15 SYRINGE (ML) INTRAVENOUS ONCE
Refills: 0 | Status: DISCONTINUED | OUTPATIENT
Start: 2022-02-28 | End: 2022-03-01

## 2022-02-28 RX ORDER — ATORVASTATIN CALCIUM 80 MG/1
40 TABLET, FILM COATED ORAL AT BEDTIME
Refills: 0 | Status: DISCONTINUED | OUTPATIENT
Start: 2022-02-28 | End: 2022-03-01

## 2022-02-28 RX ORDER — NIFEDIPINE 30 MG
60 TABLET, EXTENDED RELEASE 24 HR ORAL DAILY
Refills: 0 | Status: DISCONTINUED | OUTPATIENT
Start: 2022-02-28 | End: 2022-03-01

## 2022-02-28 RX ORDER — GLUCAGON INJECTION, SOLUTION 0.5 MG/.1ML
1 INJECTION, SOLUTION SUBCUTANEOUS ONCE
Refills: 0 | Status: DISCONTINUED | OUTPATIENT
Start: 2022-02-28 | End: 2022-03-01

## 2022-02-28 RX ORDER — COLLAGENASE CLOSTRIDIUM HIST. 250 UNIT/G
1 OINTMENT (GRAM) TOPICAL DAILY
Refills: 0 | Status: DISCONTINUED | OUTPATIENT
Start: 2022-02-28 | End: 2022-03-01

## 2022-02-28 RX ORDER — INSULIN LISPRO 100/ML
VIAL (ML) SUBCUTANEOUS
Refills: 0 | Status: DISCONTINUED | OUTPATIENT
Start: 2022-02-28 | End: 2022-03-01

## 2022-02-28 RX ORDER — ASPIRIN/CALCIUM CARB/MAGNESIUM 324 MG
81 TABLET ORAL DAILY
Refills: 0 | Status: DISCONTINUED | OUTPATIENT
Start: 2022-02-28 | End: 2022-03-01

## 2022-02-28 RX ORDER — HYDROMORPHONE HYDROCHLORIDE 2 MG/ML
0.5 INJECTION INTRAMUSCULAR; INTRAVENOUS; SUBCUTANEOUS
Refills: 0 | Status: DISCONTINUED | OUTPATIENT
Start: 2022-02-28 | End: 2022-02-28

## 2022-02-28 RX ORDER — CALCITRIOL 0.5 UG/1
0.25 CAPSULE ORAL DAILY
Refills: 0 | Status: DISCONTINUED | OUTPATIENT
Start: 2022-02-28 | End: 2022-03-01

## 2022-02-28 RX ORDER — HYDROMORPHONE HYDROCHLORIDE 2 MG/ML
1 INJECTION INTRAMUSCULAR; INTRAVENOUS; SUBCUTANEOUS
Refills: 0 | Status: DISCONTINUED | OUTPATIENT
Start: 2022-02-28 | End: 2022-02-28

## 2022-02-28 RX ADMIN — PANTOPRAZOLE SODIUM 40 MILLIGRAM(S): 20 TABLET, DELAYED RELEASE ORAL at 05:33

## 2022-02-28 RX ADMIN — CHLORHEXIDINE GLUCONATE 1 APPLICATION(S): 213 SOLUTION TOPICAL at 05:33

## 2022-02-28 RX ADMIN — SODIUM CHLORIDE 75 MILLILITER(S): 9 INJECTION, SOLUTION INTRAVENOUS at 05:34

## 2022-02-28 RX ADMIN — Medication 81 MILLIGRAM(S): at 14:09

## 2022-02-28 RX ADMIN — ATORVASTATIN CALCIUM 40 MILLIGRAM(S): 80 TABLET, FILM COATED ORAL at 21:54

## 2022-02-28 RX ADMIN — CLOPIDOGREL BISULFATE 75 MILLIGRAM(S): 75 TABLET, FILM COATED ORAL at 14:08

## 2022-02-28 RX ADMIN — HEPARIN SODIUM 5000 UNIT(S): 5000 INJECTION INTRAVENOUS; SUBCUTANEOUS at 05:33

## 2022-02-28 RX ADMIN — Medication 60 MILLIGRAM(S): at 21:54

## 2022-02-28 RX ADMIN — Medication 1 APPLICATION(S): at 13:42

## 2022-02-28 RX ADMIN — Medication 50 MILLIGRAM(S): at 05:33

## 2022-02-28 RX ADMIN — Medication 60 MILLIGRAM(S): at 05:33

## 2022-02-28 RX ADMIN — Medication 5 MILLIGRAM(S): at 21:54

## 2022-02-28 RX ADMIN — Medication 325 MILLIGRAM(S): at 21:54

## 2022-02-28 NOTE — BRIEF OPERATIVE NOTE - NSICDXBRIEFPROCEDURE_GEN_ALL_CORE_FT
PROCEDURES:  Angiogram, infrapopliteal 28-Feb-2022 20:28:22  En Rodriguez  Atherectomy, artery, superficial femoral, right 28-Feb-2022 20:28:35  En Rodriguez  Angioplasty of anterior tibial artery 28-Feb-2022 20:29:08  En Rodriguez

## 2022-02-28 NOTE — PROGRESS NOTE ADULT - SUBJECTIVE AND OBJECTIVE BOX
seen and examined   no distress   lying comfortable         PAST HISTORY  --------------------------------------------------------------------------------  No significant changes to PMH, PSH, FHx, SHx, unless otherwise noted    ALLERGIES & MEDICATIONS  --------------------------------------------------------------------------------  Allergies    No Known Allergies    Intolerances      Standing Inpatient Medications  aspirin enteric coated 81 milliGRAM(s) Oral daily  atorvastatin 40 milliGRAM(s) Oral at bedtime  buMETAnide 2 milliGRAM(s) Oral <User Schedule>  buMETAnide 2 milliGRAM(s) Oral <User Schedule>  chlorhexidine 4% Liquid 1 Application(s) Topical <User Schedule>  clopidogrel Tablet 75 milliGRAM(s) Oral daily  collagenase Ointment 1 Application(s) Topical daily  dextrose 40% Gel 15 Gram(s) Oral once  dextrose 5%. 1000 milliLiter(s) IV Continuous <Continuous>  dextrose 5%. 1000 milliLiter(s) IV Continuous <Continuous>  dextrose 50% Injectable 25 Gram(s) IV Push once  dextrose 50% Injectable 12.5 Gram(s) IV Push once  dextrose 50% Injectable 25 Gram(s) IV Push once  glucagon  Injectable 1 milliGRAM(s) IntraMuscular once  heparin   Injectable 5000 Unit(s) SubCutaneous every 12 hours  insulin lispro (ADMELOG) corrective regimen sliding scale   SubCutaneous three times a day before meals  insulin lispro Injectable (ADMELOG) 3 Unit(s) SubCutaneous three times a day before meals  lactated ringers. 1000 milliLiter(s) IV Continuous <Continuous>  melatonin 5 milliGRAM(s) Oral at bedtime  metoprolol tartrate 50 milliGRAM(s) Oral two times a day  NIFEdipine XL 60 milliGRAM(s) Oral daily  pantoprazole    Tablet 40 milliGRAM(s) Oral before breakfast  sevelamer carbonate 800 milliGRAM(s) Oral three times a day with meals      VITALS/PHYSICAL EXAM  --------------------------------------------------------------------------------  T(C): 36.4 (02-28-22 @ 05:34), Max: 36.6 (02-27-22 @ 21:42)  HR: 75 (02-28-22 @ 06:05) (62 - 75)  BP: 157/67 (02-28-22 @ 06:05) (125/58 - 173/80)  RR: 18 (02-28-22 @ 05:34) (17 - 18)  SpO2: --  Wt(kg): --        Physical Exam:  	Gen: NAD,  	Pulm: CTA B/L  	CV: S1S2; no rub  	Abd: +distended  	LE:  edema  	Vascular access: av     LABS/STUDIES  --------------------------------------------------------------------------------              9.2    8.28  >-----------<  182      [02-27-22 @ 20:00]              29.0     136  |  96  |  75  ----------------------------<  162      [02-27-22 @ 20:00]  4.9   |  20  |  7.8        Ca     6.8     [02-27-22 @ 20:00]      Mg     2.2     [02-27-22 @ 20:00]      Phos  6.4     [02-28-22 @ 02:23]    TPro  6.9  /  Alb  3.8  /  TBili  0.4  /  DBili  x   /  AST  13  /  ALT  13  /  AlkPhos  133  [02-27-22 @ 04:30]    PT/INR: PT 13.10, INR 1.14       [02-27-22 @ 20:00]  PTT: 31.4       [02-27-22 @ 20:00]      Creatinine Trend:  SCr 7.8 [02-27 @ 20:00]  SCr 6.6 [02-27 @ 04:30]  SCr 5.9 [02-26 @ 04:30]  SCr 7.0 [02-25 @ 07:00]  SCr 5.5 [02-24 @ 06:00]        PTH -- (Ca 7.5)      [02-26-22 @ 16:00]   312  Vitamin D (25OH) 21      [02-26-22 @ 16:00]  HbA1c 5.8      [01-30-20 @ 06:46]

## 2022-02-28 NOTE — PROGRESS NOTE ADULT - ASSESSMENT
# ESRD on HD   # Hypervolemia  # HTN  # Normocytic anemia / CKD  # MBD  Recommendations:  -  hd today standard bath uf 2 liters as tolerated   - d/c LR   - pt is non-oliguric, continue bumex  - phos level noted , on sevelamer, increase 2/2/2  - BP noted keep current / follow after HD   - appreciate vascular eval and follow up for angiogram today   - will follow

## 2022-03-01 ENCOUNTER — RESULT REVIEW (OUTPATIENT)
Age: 62
End: 2022-03-01

## 2022-03-01 DIAGNOSIS — M86.179 OTHER ACUTE OSTEOMYELITIS, UNSPECIFIED ANKLE AND FOOT: ICD-10-CM

## 2022-03-01 LAB
GLUCOSE BLDC GLUCOMTR-MCNC: 134 MG/DL — HIGH (ref 70–99)
GLUCOSE BLDC GLUCOMTR-MCNC: 149 MG/DL — HIGH (ref 70–99)
GLUCOSE BLDC GLUCOMTR-MCNC: 154 MG/DL — HIGH (ref 70–99)
GLUCOSE BLDC GLUCOMTR-MCNC: 222 MG/DL — HIGH (ref 70–99)

## 2022-03-01 PROCEDURE — 88311 DECALCIFY TISSUE: CPT | Mod: 26

## 2022-03-01 PROCEDURE — 88304 TISSUE EXAM BY PATHOLOGIST: CPT | Mod: 26

## 2022-03-01 PROCEDURE — 99232 SBSQ HOSP IP/OBS MODERATE 35: CPT

## 2022-03-01 RX ORDER — LANOLIN ALCOHOL/MO/W.PET/CERES
5 CREAM (GRAM) TOPICAL AT BEDTIME
Refills: 0 | Status: DISCONTINUED | OUTPATIENT
Start: 2022-03-01 | End: 2022-03-02

## 2022-03-01 RX ORDER — BUMETANIDE 0.25 MG/ML
2 INJECTION INTRAMUSCULAR; INTRAVENOUS
Refills: 0 | Status: DISCONTINUED | OUTPATIENT
Start: 2022-03-01 | End: 2022-03-02

## 2022-03-01 RX ORDER — DEXTROSE 50 % IN WATER 50 %
25 SYRINGE (ML) INTRAVENOUS ONCE
Refills: 0 | Status: DISCONTINUED | OUTPATIENT
Start: 2022-03-01 | End: 2022-03-02

## 2022-03-01 RX ORDER — ASPIRIN/CALCIUM CARB/MAGNESIUM 324 MG
81 TABLET ORAL DAILY
Refills: 0 | Status: DISCONTINUED | OUTPATIENT
Start: 2022-03-01 | End: 2022-03-02

## 2022-03-01 RX ORDER — CALCITRIOL 0.5 UG/1
0.25 CAPSULE ORAL DAILY
Refills: 0 | Status: DISCONTINUED | OUTPATIENT
Start: 2022-03-01 | End: 2022-03-02

## 2022-03-01 RX ORDER — SEVELAMER CARBONATE 2400 MG/1
800 POWDER, FOR SUSPENSION ORAL
Refills: 0 | Status: DISCONTINUED | OUTPATIENT
Start: 2022-03-01 | End: 2022-03-02

## 2022-03-01 RX ORDER — HYDROMORPHONE HYDROCHLORIDE 2 MG/ML
0.5 INJECTION INTRAMUSCULAR; INTRAVENOUS; SUBCUTANEOUS
Refills: 0 | Status: DISCONTINUED | OUTPATIENT
Start: 2022-03-01 | End: 2022-03-01

## 2022-03-01 RX ORDER — METOPROLOL TARTRATE 50 MG
50 TABLET ORAL
Refills: 0 | Status: DISCONTINUED | OUTPATIENT
Start: 2022-03-01 | End: 2022-03-02

## 2022-03-01 RX ORDER — ATORVASTATIN CALCIUM 80 MG/1
40 TABLET, FILM COATED ORAL AT BEDTIME
Refills: 0 | Status: DISCONTINUED | OUTPATIENT
Start: 2022-03-01 | End: 2022-03-02

## 2022-03-01 RX ORDER — INSULIN LISPRO 100/ML
VIAL (ML) SUBCUTANEOUS
Refills: 0 | Status: DISCONTINUED | OUTPATIENT
Start: 2022-03-01 | End: 2022-03-02

## 2022-03-01 RX ORDER — COLLAGENASE CLOSTRIDIUM HIST. 250 UNIT/G
1 OINTMENT (GRAM) TOPICAL DAILY
Refills: 0 | Status: DISCONTINUED | OUTPATIENT
Start: 2022-03-01 | End: 2022-03-02

## 2022-03-01 RX ORDER — INSULIN LISPRO 100/ML
3 VIAL (ML) SUBCUTANEOUS
Refills: 0 | Status: DISCONTINUED | OUTPATIENT
Start: 2022-03-01 | End: 2022-03-02

## 2022-03-01 RX ORDER — GLUCAGON INJECTION, SOLUTION 0.5 MG/.1ML
1 INJECTION, SOLUTION SUBCUTANEOUS ONCE
Refills: 0 | Status: DISCONTINUED | OUTPATIENT
Start: 2022-03-01 | End: 2022-03-02

## 2022-03-01 RX ORDER — HEPARIN SODIUM 5000 [USP'U]/ML
5000 INJECTION INTRAVENOUS; SUBCUTANEOUS EVERY 12 HOURS
Refills: 0 | Status: DISCONTINUED | OUTPATIENT
Start: 2022-03-01 | End: 2022-03-02

## 2022-03-01 RX ORDER — NIFEDIPINE 30 MG
60 TABLET, EXTENDED RELEASE 24 HR ORAL DAILY
Refills: 0 | Status: DISCONTINUED | OUTPATIENT
Start: 2022-03-01 | End: 2022-03-02

## 2022-03-01 RX ORDER — CLOPIDOGREL BISULFATE 75 MG/1
75 TABLET, FILM COATED ORAL DAILY
Refills: 0 | Status: DISCONTINUED | OUTPATIENT
Start: 2022-03-01 | End: 2022-03-02

## 2022-03-01 RX ORDER — SODIUM CHLORIDE 9 MG/ML
1000 INJECTION INTRAMUSCULAR; INTRAVENOUS; SUBCUTANEOUS
Refills: 0 | Status: DISCONTINUED | OUTPATIENT
Start: 2022-03-01 | End: 2022-03-01

## 2022-03-01 RX ORDER — DEXTROSE 50 % IN WATER 50 %
15 SYRINGE (ML) INTRAVENOUS ONCE
Refills: 0 | Status: DISCONTINUED | OUTPATIENT
Start: 2022-03-01 | End: 2022-03-02

## 2022-03-01 RX ADMIN — Medication 81 MILLIGRAM(S): at 17:49

## 2022-03-01 RX ADMIN — Medication 5 MILLIGRAM(S): at 21:42

## 2022-03-01 RX ADMIN — SEVELAMER CARBONATE 800 MILLIGRAM(S): 2400 POWDER, FOR SUSPENSION ORAL at 17:48

## 2022-03-01 RX ADMIN — Medication 60 MILLIGRAM(S): at 06:00

## 2022-03-01 RX ADMIN — Medication 50 MILLIGRAM(S): at 05:28

## 2022-03-01 RX ADMIN — ATORVASTATIN CALCIUM 40 MILLIGRAM(S): 80 TABLET, FILM COATED ORAL at 21:42

## 2022-03-01 RX ADMIN — CALCITRIOL 0.25 MICROGRAM(S): 0.5 CAPSULE ORAL at 17:49

## 2022-03-01 RX ADMIN — BUMETANIDE 2 MILLIGRAM(S): 0.25 INJECTION INTRAMUSCULAR; INTRAVENOUS at 05:29

## 2022-03-01 RX ADMIN — HEPARIN SODIUM 5000 UNIT(S): 5000 INJECTION INTRAVENOUS; SUBCUTANEOUS at 17:49

## 2022-03-01 RX ADMIN — CLOPIDOGREL BISULFATE 75 MILLIGRAM(S): 75 TABLET, FILM COATED ORAL at 17:48

## 2022-03-01 RX ADMIN — SODIUM CHLORIDE 75 MILLILITER(S): 9 INJECTION, SOLUTION INTRAVENOUS at 00:00

## 2022-03-01 RX ADMIN — Medication 3 UNIT(S): at 17:50

## 2022-03-01 RX ADMIN — Medication 50 MILLIGRAM(S): at 17:53

## 2022-03-01 RX ADMIN — Medication 60 MILLIGRAM(S): at 17:48

## 2022-03-01 NOTE — PROGRESS NOTE ADULT - ATTENDING COMMENTS
60 yo M with PMHx of ESRD on HD M/W/F, Normocytic Anemia of Chronic Disease, DM Type II, Hypertension, DLD, TIA, CAD s/p staged PCI CABG 2012, PVD s/p left below knee popliteal to AT artery reverse saphenous vein graft in 4/2017 and balloon plasty of his left lower extremity bypass graft in 6/2020, and carotid stenosis s/p right carotid endarterectomy in 6/2017 sent in from o/p dialysis. Admitted to inpatient for dyspnea 2/2 fluid overload, improved.     # ESRD on HD MWF   # Fluid Overload - resolved   - s/p HD sessions   - c/w PO Bumex  - continue nifedipine 60mg  - c/w sevelamer TID    # Elevated trops 2/2 CKD  - no chest pain      # Right heel ulcer  - Podiatry recs reviewed. Obtain MRI foot.     # Anemia chronic disease - stable     # DM2  - start ISS     # DLD  - c/w statin    # CAD s/p staged PCI CABG 2012  # PVD s/p left below knee popliteal to AT artery reverse saphenous vein graft in 4/2017 and balloon plasty of his left lower extremity bypass graft in 6/2020 # Carotid stenosis s/p right carotid endarterectomy in 6/ 2017  - c/w asa 81mg, Plavix, and statin     DVT ppx: heparin SC     Pending: heel ulcer w/u, MRI foot
60 yo M with PMHx of ESRD on HD M/W/F, Normocytic Anemia of Chronic Disease, DM Type II, Hypertension, DLD, TIA, CAD s/p staged PCI CABG 2012, PVD s/p left below knee popliteal to AT artery reverse saphenous vein graft in 4/2017 and balloon plasty of his left lower extremity bypass graft in 6/2020, and carotid stenosis s/p right carotid endarterectomy in 6/2017 sent in from o/p dialysis. Admitted to inpatient for dyspnea 2/2 fluid overload, improved.     # ESRD on HD MWF   # Fluid Overload - resolved   - s/p HD sessions   - c/w PO Bumex  - continue nifedipine 60mg  - c/w sevelamer TID    # Elevated trops 2/2 CKD  - no chest pain      # PVD   - Arterial duplex showed left-patent bypass with no evidence of stenosis is normal flow in distal anterior tibial artery. Minimal flow in negative posterior tibial and peroneal arteries.   - c/w DAPT, statin     # Right heel ulcer  - Podiatry recs reviewed. Obtain MRI foot.     # Anemia chronic disease - stable     # DM2  - c/w ISS   - start 3 units Humalog TIDAC     # DLD  - c/w statin    # CAD s/p staged PCI CABG 2012  # PVD s/p left below knee popliteal to AT artery reverse saphenous vein graft in 4/2017 and balloon plasty of his left lower extremity bypass graft in 6/2020 # Carotid stenosis s/p right carotid endarterectomy in 6/ 2017  - c/w asa 81mg, Plavix, and statin     DVT ppx: heparin SC     Pending: heel ulcer w/u, MRI foot and vascular consult
62 yo M with PMHx of ESRD on HD M/W/F, Normocytic Anemia of Chronic Disease, DM Type II, Hypertension, DLD, TIA, CAD s/p staged PCI CABG 2012, PVD s/p left below knee popliteal to AT artery reverse saphenous vein graft in 4/2017 and balloon plasty of his left lower extremity bypass graft in 6/2020, and carotid stenosis s/p right carotid endarterectomy in 6/2017 sent in from o/p dialysis. Admitted to inpatient for dyspnea 2/2 fluid overload, improved.       # ESRD on HD MWF   # Fluid Overload - resolved   - s/p HD sessions   - c/w PO Bumex  - continue nifedipine 60mg  - c/w sevelamer TID     # Elevated trops 2/2 CKD   - no chest pain      # CAD s/p staged PCI CABG 2012  # PVD s/p left below knee popliteal to AT artery reverse saphenous vein graft in 4/2017 and balloon plasty of his left lower extremity bypass graft in 6/2020   # Carotid stenosis s/p right carotid endarterectomy in 6/ 2017  - Arterial duplex showed left-patent bypass with no evidence of stenosis is normal flow in distal anterior tibial artery. Minimal flow in negative posterior tibial and peroneal arteries.   - c/w DAPT, statin   - Angiogram today      # Right heel ulcer, Osteomyelitis   - Podiatry recs reviewed - Debridement Tuesday - Patient is medically optimized for the planned procedure.   - ID consult - hold abx     # Anemia chronic disease - stable     # DM2  - c/w ISS   - c/w 3 units Humalog TIDAC   - c/w Lantus 8 units at bedtime     # DLD  - c/w statin    DVT ppx: heparin SC     Pending: angiogram today, Debridement tomorrow
62 yo M with PMHx of ESRD on HD M/W/F, Normocytic Anemia of Chronic Disease, DM Type II, Hypertension, DLD, TIA, CAD s/p staged PCI CABG 2012, PVD s/p left below knee popliteal to AT artery reverse saphenous vein graft in 4/2017 and balloon plasty of his left lower extremity bypass graft in 6/2020, and carotid stenosis s/p right carotid endarterectomy in 6/2017 sent in from o/p dialysis. Admitted to inpatient for dyspnea 2/2 fluid overload, improved.     # ESRD on HD MWF   # Fluid Overload - resolved   - s/p HD sessions   - c/w PO Bumex  - continue nifedipine 60mg  - c/w sevelamer TID    # Elevated trops 2/2 CKD  - no chest pain      # PVD   - Arterial duplex showed left-patent bypass with no evidence of stenosis is normal flow in distal anterior tibial artery. Minimal flow in negative posterior tibial and peroneal arteries.   - c/w DAPT, statin   - Angiogram Monday     # Right heel ulcer, Osteomyelitis   - Podiatry recs reviewed  - ID consult     # Anemia chronic disease - stable     # DM2  - c/w ISS   - c/w 3 units Humalog TIDAC   - start Lantus 8 units at bedtime     # DLD  - c/w statin    # CAD s/p staged PCI CABG 2012  # PVD s/p left below knee popliteal to AT artery reverse saphenous vein graft in 4/2017 and balloon plasty of his left lower extremity bypass graft in 6/2020 # Carotid stenosis s/p right carotid endarterectomy in 6/ 2017  - c/w asa 81mg, Plavix, and statin     DVT ppx: heparin SC     Pending: angiogram Monday, ID consult
as above-seen this morning, good spirits    discussed with resident    hopeful discharge 24 hours
60 yo M with PMHx of ESRD on HD M/W/F, Normocytic Anemia of Chronic Disease, DM Type II, Hypertension, DLD, TIA, CAD s/p staged PCI CABG 2012, PVD s/p left below knee popliteal to AT artery reverse saphenous vein graft in 4/2017 and balloon plasty of his left lower extremity bypass graft in 6/2020, and carotid stenosis s/p right carotid endarterectomy in 6/2017 sent in from o/p dialysis. Admitted to inpatient for dyspnea 2/2 fluid overload, improved.       # ESRD on HD MWF   # Fluid Overload - resolved   - s/p HD sessions   - c/w PO Bumex  - continue nifedipine 60mg  - c/w sevelamer TID     # Elevated trops 2/2 CKD   - no chest pain      # PVD   - Arterial duplex showed left-patent bypass with no evidence of stenosis is normal flow in distal anterior tibial artery. Minimal flow in negative posterior tibial and peroneal arteries.   - c/w DAPT, statin   - Angiogram Monday     # Right heel ulcer, Osteomyelitis   - Podiatry recs reviewed - Debridement Tuesday   - ID consult - hold abx     # Anemia chronic disease - stable     # DM2  - c/w ISS   - c/w 3 units Humalog TIDAC   - c/w Lantus 8 units at bedtime     # DLD  - c/w statin    # CAD s/p staged PCI CABG 2012  # PVD s/p left below knee popliteal to AT artery reverse saphenous vein graft in 4/2017 and balloon plasty of his left lower extremity bypass graft in 6/2020 # Carotid stenosis s/p right carotid endarterectomy in 6/ 2017  - c/w asa 81mg, Plavix, and statin     DVT ppx: heparin SC     Pending: angiogram Monday, Debridement Tuesday

## 2022-03-01 NOTE — PROGRESS NOTE ADULT - SUBJECTIVE AND OBJECTIVE BOX
CHIEF COMPLAINT:  Patient is a 61y old  Male who presents with a chief complaint of right heel ulcer (24 Feb 2022 13:18)      INTERVAL HISTORY/OVERNIGHT EVENTS:  no events overnight, s/p angiogram atherectomy balloon angioplasty  debridement today    ======================  MEDICATIONS:  aspirin enteric coated 81 milliGRAM(s) Oral daily  atorvastatin 40 milliGRAM(s) Oral at bedtime  buMETAnide 2 milliGRAM(s) Oral <User Schedule>  calcitriol   Capsule 0.25 MICROGram(s) Oral daily  clopidogrel Tablet 75 milliGRAM(s) Oral daily  collagenase Ointment 1 Application(s) Topical daily  dextrose 40% Gel 15 Gram(s) Oral once  dextrose 50% Injectable 25 Gram(s) IV Push once  dextrose 50% Injectable 25 Gram(s) IV Push once  glucagon  Injectable 1 milliGRAM(s) IntraMuscular once  heparin   Injectable 5000 Unit(s) SubCutaneous every 12 hours  insulin lispro (ADMELOG) corrective regimen sliding scale   SubCutaneous three times a day before meals  insulin lispro Injectable (ADMELOG) 3 Unit(s) SubCutaneous three times a day before meals  melatonin 5 milliGRAM(s) Oral at bedtime  metoprolol tartrate 50 milliGRAM(s) Oral two times a day  NIFEdipine XL 60 milliGRAM(s) Oral daily  sevelamer carbonate 800 milliGRAM(s) Oral three times a day with meals    DRIPS:    PRN:       ======================  PHYSICAL EXAMINATION:  GEN:  nad.   HEENT:  eomi. ncat  PULM:  b/l bs.  clear.  no wheezing. no crackles or rales.   CARD: regular. s1, s2.  no murmurs.   ABD: +bs. ntnd  EXT:  no new rashes.  dry flaky LE bilat, rt extremity lower bandaged  NEURO:  no new focal deficits.   ======================  OBJECTIVE:        VS:  T(F): 97 (03-01 @ 14:10), Max: 97.9 (03-01 @ 05:39)  HR: 67 (03-01 @ 15:30) (66 - 74)  BP: 135/75 (03-01 @ 15:30) (125/66 - 159/74)  RR: 17 (03-01 @ 15:30) (14 - 21)  SpO2: 96% (03-01 @ 15:30) (92% - 99%)  CVP(mm Hg): --  CO: --  CI: --  PA: --  PCWP: --    I/O:      02-28 @ 07:01  -  03-01 @ 07:00  --------------------------------------------------------  IN: 0 mL / OUT: 2500 mL / NET: -2500 mL        Weight trend:  Weight (kg): 103.4 (03-01)    ======================    LABS:                          9.2    8.28  )-----------( 182      ( 27 Feb 2022 20:00 )             29.0     02-27    136  |  96<L>  |  75<HH>  ----------------------------<  162<H>  4.9   |  20  |  7.8<HH>    Ca    6.8<L>      27 Feb 2022 20:00  Phos  6.4     02-28  Mg     2.2     02-27        PT/INR - ( 27 Feb 2022 20:00 )   PT: 13.10 sec;   INR: 1.14 ratio         PTT - ( 27 Feb 2022 20:00 )  PTT:31.4 sec              Cultures:

## 2022-03-01 NOTE — PROGRESS NOTE ADULT - ASSESSMENT
ASSESSMENT  62 yo M with PMHx of CAD, s/p staged PCI CABG 2012, ESRD on HD M/W/F, Normocytic Anemia of Chronic Disease, DM Type II, Hypertension, DLD, TIA, PVD s/p left below knee popliteal to AT artery reverse saphenous vein graft in April 2017 and balloon plasty of his left lower extremity bypass graft in June 2020, and carotid stenosis s/p right carotid endarterectomy in June 2017 sent in from o/p dialysis (prior to starting HD) because he appeared pale.     IMPRESSION  #Infected posterior heel ulcer with osteomyelitis     2/26 WCX   Moderate Escherichia coli    Moderate Enterobacter cloacae complex    Moderate Enterococcus faecalis    Rare Staphylococcus simulans "Susceptibilities not performed"    Numerous Corynebacterium species "Susceptibilities not performed"    Numerous Prevotella disiens "Susceptibilities not performed"  < from: MR Ankle No Cont, Right (02.24.22 @ 22:06) >  Posterior heel ulcer with subcutaneous edema, without drainable fluid collection. Osteomyelitis involving the posteromedial calcaneal tuberosity, as above.  #Morbid Obesity BMI (kg/m2): 35.7  #ESRD on HD  Creatinine, Serum: 7.0 (02-25-22 @ 07:00)    Weight (kg): 103.4 (02-21-22 @ 07:16)    RECOMMENDATIONS  - ESR, CRP  - PO flagyl 500mg TID & Cefepime 2g post HD on HD days x 6 weeks   - f/u final sensitivities     If any questions, please call or send a message on [facility] Teams  Please continue to update ID with any pertinent new laboratory or radiographic findings  Spectra [phone]

## 2022-03-01 NOTE — PROGRESS NOTE ADULT - ASSESSMENT
ASSESSMENT & PLAN  60 yo M with PMHx of ESRD on HD M/W/F, Normocytic Anemia of Chronic Disease, DM Type II, Hypertension, DLD, TIA, CAD s/p staged PCI CABG 2012, PVD s/p left below knee popliteal to AT artery reverse saphenous vein graft in 4/2017 and balloon plasty of his left lower extremity bypass graft in 6/2020, and carotid stenosis s/p right carotid endarterectomy in 6/2017 sent in from o/p dialysis (prior to starting HD) for appearing pale. Admitted to inpatient for dyspnea 2/2 fluid overload, improved, still in hospital admission for podiatry procedure due to right heel ulcer osteomyelitis.     #Right hell ulcer: Osteomyelitis  - Xray ankle reviewed: posterior heel ulcer with no definitive evidence of osteomyelitis- there is mild periosteal reaction and would need MRI ankle for further evaluation  - Duplex venous reviewed: No DVT  - Duplex arterial reviewed: minimal flow in PT and peroneal arteries.   -Angiogram infrapopliteal, Atherectomy right superficial femoral artery, Balloon angioplasty of Anterior tibial artery, Atherectomy of anterior tibial artery   -debridement today  -no further management per podiatry; ok to d/c tomorrow; o/p f/u  -f/u ID recs for ABX    # ESRD on HD MWF, non-oliguric  # Fluid Overload; suspected due to diet/fluid intake+ incorrect use of home lasix; r/o cardiac cause  - did not miss any sessions as o/p; reviewed nephro recs. s/p HD 2 days in a row. will hold HD for AM today  - diet and fluid intake not ideal and seems meds were not being taken properly  - switched from lasix to PO bumex. nephro recs reviewed: continue bumex,, nifedipine 60mg  - c/w sevelamer TID  - BNP >70K  - TTE 5/2021 with EF 50-55% and grade 1 diastolic dysfunction; f/u repeat echo    #Elevated trops- 0.19: likely 2/2 CKD  - no active chest pain sx  - lower than previous levels- 0.25    # Anemia chronic disease  - On admission Hb 9.9  -likely d/t to kidney dz    # DM2  - takes lantus 40 units at night and trulicity weekly  - monitor FS and start insulin regimen if consistently >180    # HTN  - initially elevated in ED  - only on metoprolol 50mg BID at home  - monitor BP after HD    # DLD  - c/w statin    # CAD s/p staged PCI CABG 2012  # PVD s/p left below knee popliteal to AT artery reverse saphenous vein graft in 4/2017 and balloon plasty of his left lower extremity bypass graft in 6/2020 # Carotid stenosis s/p right carotid endarterectomy in 6/ 2017  - c/w asa 81mg, plavix, and BB    #DM2  - on SSI      DVT ppx: heparin SC  GI ppx: NA  Diet: dash; fluid restriction  Code Status: Full Code      HANDOFF: ANTICPATE D/C TOMORROW

## 2022-03-01 NOTE — PROGRESS NOTE ADULT - ASSESSMENT
Groin checks  Pulse checks   Pain management   Vascular to follow   Asa  Plavix   Heparin subq  LR @75

## 2022-03-01 NOTE — BRIEF OPERATIVE NOTE - OPERATION/FINDINGS
Preop: right foot wound   Operation: Angiogram infrapopliteal, Atherectomy right superficial femoral artery, Balloon angioplasty of Anterior tibial artery, Atherectomy of anterior tibial artery   Left groin access
None

## 2022-03-01 NOTE — PROGRESS NOTE ADULT - SUBJECTIVE AND OBJECTIVE BOX
SCHWABACHER, LAWRENCE  61y, Male  Allergy: No Known Allergies      LOS  8d    CHIEF COMPLAINT: right heel ulcer (24 Feb 2022 13:18)      INTERVAL EVENTS/HPI  - No acute events overnight  - T(F): , Max: 98 (02-28-22 @ 16:40)  - Tolerating medication  - WBC Count: 8.28 (02-27-22 @ 20:00)  WBC Count: 8.93 (02-27-22 @ 04:30)     - Creatinine, Serum: 7.8 (02-27-22 @ 20:00)       ROS  ***    VITALS:  T(F): 97.9, Max: 98 (02-28-22 @ 16:40)  HR: 74  BP: 159/74  RR: 18Vital Signs Last 24 Hrs  T(C): 36.6 (01 Mar 2022 05:39), Max: 36.7 (28 Feb 2022 16:40)  T(F): 97.9 (01 Mar 2022 05:39), Max: 98 (28 Feb 2022 16:40)  HR: 74 (01 Mar 2022 05:39) (66 - 77)  BP: 159/74 (01 Mar 2022 05:39) (131/72 - 159/74)  BP(mean): --  RR: 18 (01 Mar 2022 05:39) (15 - 21)  SpO2: 96% (28 Feb 2022 22:45) (92% - 99%)    PHYSICAL EXAM:  ***    FH: Non-contributory  Social Hx: Non-contributory    TESTS & MEASUREMENTS:                        9.2    8.28  )-----------( 182      ( 27 Feb 2022 20:00 )             29.0     02-27    136  |  96<L>  |  75<HH>  ----------------------------<  162<H>  4.9   |  20  |  7.8<HH>    Ca    6.8<L>      27 Feb 2022 20:00  Phos  6.4     02-28  Mg     2.2     02-27              Culture - Abscess with Gram Stain (collected 02-26-22 @ 10:42)  Source: .Abscess r heel  Final Report (02-28-22 @ 21:46):    Culture yields >4 types of aerobic and/or anaerobic bacteria    Call client services within 7 days if further workup is clinically    indicated. including    Moderate Escherichia coli    Moderate Enterobacter cloacae complex    Moderate Enterococcus faecalis    Rare Staphylococcus simulans "Susceptibilities not performed"    Numerous Corynebacterium species "Susceptibilities not performed"    Numerous Prevotella disiens "Susceptibilities not performed"  Organism: Escherichia coli  Enterococcus faecalis (02-28-22 @ 21:42)  Organism: Enterococcus faecalis (02-28-22 @ 21:42)      -  Ampicillin: S <=2 Predicts results to ampicillin/sulbactam, amoxacillin-clavulanate and  piperacillin-tazobactam.      -  Tetra/Doxy: R >8      -  Vancomycin: S 1      Method Type: CONNER  Organism: Escherichia coli (02-28-22 @ 21:42)      -  Amikacin: S <=16      -  Amoxicillin/Clavulanic Acid: S <=8/4      -  Ampicillin: S <=8 These ampicillin results predict results for amoxicillin      -  Ampicillin/Sulbactam: S <=4/2 Enterobacter, Klebsiella aerogenes, Citrobacter, and Serratia may develop resistance during prolonged therapy (3-4 days)      -  Aztreonam: S <=4      -  Cefazolin: S <=2 Enterobacter, Klebsiella aerogenes, Citrobacter, and Serratia may develop resistance during prolonged therapy (3-4 days)      -  Cefepime: S <=2      -  Cefoxitin: S <=8      -  Ceftriaxone: S <=1 Enterobacter, Klebsiella aerogenes, Citrobacter, and Serratia may develop resistance during prolonged therapy      -  Ciprofloxacin: S <=0.25      -  Ertapenem: S <=0.5      -  Gentamicin: S <=2      -  Imipenem: S <=1      -  Levofloxacin: S <=0.5      -  Meropenem: S <=1      -  Piperacillin/Tazobactam: S <=8      -  Tobramycin: S <=2      -  Trimethoprim/Sulfamethoxazole: S <=0.5/9.5      Method Type: CONNER            INFECTIOUS DISEASES TESTING  COVID-19 PCR: NotDetec (02-27-22 @ 15:30)  COVID-19 PCR: NotDetec (02-23-22 @ 11:50)  COVID-19 PCR: NotDetec (02-21-22 @ 10:25)  COVID-19 PCR: NotDetec (08-14-21 @ 00:30)  COVID-19 PCR: NotDetec (05-15-21 @ 17:00)  strept    INFLAMMATORY MARKERS  C-Reactive Protein, Serum: 14 mg/L (02-26-22 @ 04:30)  Sedimentation Rate, Erythrocyte: 25 mm/Hr (02-26-22 @ 04:30)  Sedimentation Rate, Erythrocyte: 15 mm/Hr (02-22-22 @ 06:30)  C-Reactive Protein, Serum: 16 mg/L (02-22-22 @ 06:30)      RADIOLOGY & ADDITIONAL TESTS:  I have personally reviewed the last available Chest xray  CXR      CT      CARDIOLOGY TESTING  12 Lead ECG:   Ventricular Rate 66 BPM    Atrial Rate 66 BPM    P-R Interval 180 ms    QRS Duration 92 ms    Q-T Interval 464 ms    QTC Calculation(Bazett) 486 ms    P Axis 39 degrees    R Axis 63 degrees    T Axis 151 degrees    Diagnosis Line Normal sinus rhythm  Nonspecific T wave abnormality  Prolonged QT  Abnormal ECG    Confirmed by Ramirez Garcia ([id number]) on 2/22/2022 9:10:38 AM (02-22-22 @ 08:51)  12 Lead ECG:   Ventricular Rate 75 BPM    Atrial Rate 75 BPM    P-R Interval 204 ms    QRS Duration 98 ms    Q-T Interval 470 ms    QTC Calculation(Bazett) 524 ms    P Axis 26 degrees    R Axis -38 degrees    T Axis 107 degrees    Diagnosis Line Normal sinus rhythm  Left axis deviation  Anterior infarct , age undetermined  T wave abnormality, consider lateral ischemia  Prolonged QT  Abnormal ECG    Confirmed by Ramirez Garcia ([id number]) on 2/22/2022 8:56:16 AM (02-21-22 @ 10:48)      MEDICATIONS  aspirin enteric coated 81 Oral daily  atorvastatin 40 Oral at bedtime  buMETAnide 2 Oral <User Schedule>  buMETAnide 2 Oral <User Schedule>  calcitriol   Capsule 0.25 Oral daily  chlorhexidine 4% Liquid 1 Topical <User Schedule>  clopidogrel Tablet 75 Oral daily  collagenase Ointment 1 Topical daily  dextrose 40% Gel 15 Oral once  dextrose 50% Injectable 25 IV Push once  dextrose 50% Injectable 12.5 IV Push once  dextrose 50% Injectable 25 IV Push once  glucagon  Injectable 1 IntraMuscular once  heparin   Injectable 5000 SubCutaneous every 12 hours  insulin lispro (ADMELOG) corrective regimen sliding scale  SubCutaneous three times a day before meals  insulin lispro Injectable (ADMELOG) 3 SubCutaneous three times a day before meals  lactated ringers. 1000 IV Continuous <Continuous>  melatonin 5 Oral at bedtime  metoprolol tartrate 50 Oral two times a day  NIFEdipine XL 60 Oral daily  sevelamer carbonate 800 Oral three times a day with meals      WEIGHT  Weight (kg): 103.4 (02-28-22 @ 18:19)      ANTIBIOTICS:      All available historical records have been reviewed       SCHWABACHER, LAWRENCE  61y, Male  Allergy: No Known Allergies      LOS  8d    CHIEF COMPLAINT: right heel ulcer (24 Feb 2022 13:18)      INTERVAL EVENTS/HPI  - No acute events overnight  - T(F): , Max: 98 (02-28-22 @ 16:40)  - Tolerating medication  - WBC Count: 8.28 (02-27-22 @ 20:00)  WBC Count: 8.93 (02-27-22 @ 04:30)     - Creatinine, Serum: 7.8 (02-27-22 @ 20:00)       ROS  General: Denies rigors, nightsweats  HEENT: Denies headache, rhinorrhea, sore throat, eye pain  CV: Denies CP, palpitations  PULM: Denies wheezing, hemoptysis  GI: Denies hematemesis, hematochezia, melena  : Denies discharge, hematuria  MSK: Denies arthralgias, myalgias  SKIN: Denies rash, lesions  NEURO: Denies paresthesias, weakness  PSYCH: Denies depression, anxiety     VITALS:  T(F): 97.9, Max: 98 (02-28-22 @ 16:40)  HR: 74  BP: 159/74  RR: 18Vital Signs Last 24 Hrs  T(C): 36.6 (01 Mar 2022 05:39), Max: 36.7 (28 Feb 2022 16:40)  T(F): 97.9 (01 Mar 2022 05:39), Max: 98 (28 Feb 2022 16:40)  HR: 74 (01 Mar 2022 05:39) (66 - 77)  BP: 159/74 (01 Mar 2022 05:39) (131/72 - 159/74)  BP(mean): --  RR: 18 (01 Mar 2022 05:39) (15 - 21)  SpO2: 96% (28 Feb 2022 22:45) (92% - 99%)    PHYSICAL EXAM:  PHYSICAL EXAM:  Gen: NAD, resting in bed  HEENT: Normocephalic, atraumatic  Neck: supple, no lymphadenopathy  CV: Regular rate & regular rhythm  Lungs: decreased BS at bases, no fremitus  Abdomen: Soft, BS present  Ext: Warm, well perfused LE dressings   Neuro: non focal, awake  Skin: no rash, no erythema  Lines: no phlebitis     FH: Non-contributory  Social Hx: Non-contributory    TESTS & MEASUREMENTS:                        9.2    8.28  )-----------( 182      ( 27 Feb 2022 20:00 )             29.0     02-27    136  |  96<L>  |  75<HH>  ----------------------------<  162<H>  4.9   |  20  |  7.8<HH>    Ca    6.8<L>      27 Feb 2022 20:00  Phos  6.4     02-28  Mg     2.2     02-27              Culture - Abscess with Gram Stain (collected 02-26-22 @ 10:42)  Source: .Abscess r heel  Final Report (02-28-22 @ 21:46):    Culture yields >4 types of aerobic and/or anaerobic bacteria    Call client services within 7 days if further workup is clinically    indicated. including    Moderate Escherichia coli    Moderate Enterobacter cloacae complex    Moderate Enterococcus faecalis    Rare Staphylococcus simulans "Susceptibilities not performed"    Numerous Corynebacterium species "Susceptibilities not performed"    Numerous Prevotella disiens "Susceptibilities not performed"  Organism: Escherichia coli  Enterococcus faecalis (02-28-22 @ 21:42)  Organism: Enterococcus faecalis (02-28-22 @ 21:42)      -  Ampicillin: S <=2 Predicts results to ampicillin/sulbactam, amoxacillin-clavulanate and  piperacillin-tazobactam.      -  Tetra/Doxy: R >8      -  Vancomycin: S 1      Method Type: CONNER  Organism: Escherichia coli (02-28-22 @ 21:42)      -  Amikacin: S <=16      -  Amoxicillin/Clavulanic Acid: S <=8/4      -  Ampicillin: S <=8 These ampicillin results predict results for amoxicillin      -  Ampicillin/Sulbactam: S <=4/2 Enterobacter, Klebsiella aerogenes, Citrobacter, and Serratia may develop resistance during prolonged therapy (3-4 days)      -  Aztreonam: S <=4      -  Cefazolin: S <=2 Enterobacter, Klebsiella aerogenes, Citrobacter, and Serratia may develop resistance during prolonged therapy (3-4 days)      -  Cefepime: S <=2      -  Cefoxitin: S <=8      -  Ceftriaxone: S <=1 Enterobacter, Klebsiella aerogenes, Citrobacter, and Serratia may develop resistance during prolonged therapy      -  Ciprofloxacin: S <=0.25      -  Ertapenem: S <=0.5      -  Gentamicin: S <=2      -  Imipenem: S <=1      -  Levofloxacin: S <=0.5      -  Meropenem: S <=1      -  Piperacillin/Tazobactam: S <=8      -  Tobramycin: S <=2      -  Trimethoprim/Sulfamethoxazole: S <=0.5/9.5      Method Type: [facility]            INFECTIOUS DISEASES TESTING  COVID-19 PCR: NotDetec (02-27-22 @ 15:30)  COVID-19 PCR: NotDetec (02-23-22 @ 11:50)  COVID-19 PCR: NotDetec (02-21-22 @ 10:25)  COVID-19 PCR: NotDetec (08-14-21 @ 00:30)  COVID-19 PCR: NotDetec (05-15-21 @ 17:00)  strept    INFLAMMATORY MARKERS  C-Reactive Protein, Serum: 14 mg/L (02-26-22 @ 04:30)  Sedimentation Rate, Erythrocyte: 25 mm/Hr (02-26-22 @ 04:30)  Sedimentation Rate, Erythrocyte: 15 mm/Hr (02-22-22 @ 06:30)  C-Reactive Protein, Serum: 16 mg/L (02-22-22 @ 06:30)      RADIOLOGY & ADDITIONAL TESTS:  I have personally reviewed the last available Chest xray  CXR      CT      CARDIOLOGY TESTING  12 Lead ECG:   Ventricular Rate 66 BPM    Atrial Rate 66 BPM    P-R Interval 180 ms    QRS Duration 92 ms    Q-T Interval 464 ms    QTC Calculation(Bazett) 486 ms    P Axis 39 degrees    R Axis 63 degrees    T Axis 151 degrees    Diagnosis Line Normal sinus rhythm  Nonspecific T wave abnormality  Prolonged QT  Abnormal ECG    Confirmed by Ramirez Garcia ([id number]) on 2/22/2022 9:10:38 AM (02-22-22 @ 08:51)  12 Lead ECG:   Ventricular Rate 75 BPM    Atrial Rate 75 BPM    P-R Interval 204 ms    QRS Duration 98 ms    Q-T Interval 470 ms    QTC Calculation(Bazett) 524 ms    P Axis 26 degrees    R Axis -38 degrees    T Axis 107 degrees    Diagnosis Line Normal sinus rhythm  Left axis deviation  Anterior infarct , age undetermined  T wave abnormality, consider lateral ischemia  Prolonged QT  Abnormal ECG    Confirmed by Ramirez Garcia ([id number]) on 2/22/2022 8:56:16 AM (02-21-22 @ 10:48)      MEDICATIONS  aspirin enteric coated 81 Oral daily  atorvastatin 40 Oral at bedtime  buMETAnide 2 Oral <User Schedule>  buMETAnide 2 Oral <User Schedule>  calcitriol   Capsule 0.25 Oral daily  chlorhexidine 4% Liquid 1 Topical <User Schedule>  clopidogrel Tablet 75 Oral daily  collagenase Ointment 1 Topical daily  dextrose 40% Gel 15 Oral once  dextrose 50% Injectable 25 IV Push once  dextrose 50% Injectable 12.5 IV Push once  dextrose 50% Injectable 25 IV Push once  glucagon  Injectable 1 IntraMuscular once  heparin   Injectable 5000 SubCutaneous every 12 hours  insulin lispro (ADMELOG) corrective regimen sliding scale  SubCutaneous three times a day before meals  insulin lispro Injectable (ADMELOG) 3 SubCutaneous three times a day before meals  lactated ringers. 1000 IV Continuous <Continuous>  melatonin 5 Oral at bedtime  metoprolol tartrate 50 Oral two times a day  NIFEdipine XL 60 Oral daily  sevelamer carbonate 800 Oral three times a day with meals      WEIGHT  Weight (kg): 103.4 (02-28-22 @ 18:19)      ANTIBIOTICS:      All available historical records have been reviewed

## 2022-03-01 NOTE — PROGRESS NOTE ADULT - SUBJECTIVE AND OBJECTIVE BOX
Vascular Surgery Post Operative Note      Procedure: Status post  RLE angiogram infrapop , atherectomy right sfa , balloon angioplasty of anterior tibial artery, atherectomy of anterior tibial artery   · Operative Findings	Preop: right foot wound   Operation: Angiogram infrapopliteal, Atherectomy right superficial femoral artery, Balloon angioplasty of Anterior tibial artery, Atherectomy of anterior tibial artery   Left groin access    Post Operative day #1    BROWN FLUID OVERLOAD    ^BROWN FLUID OVERLOAD    Family history of heart disease (Father)    DM (diabetes mellitus) (Mother)    ESRD (end stage renal disease) on dialysis (Mother)    Handoff    MEWS Score    Diabetes    CAD (coronary artery disease)    S/P CABG (coronary artery bypass graft)    High cholesterol    Stroke    Diabetes mellitus    Transient ischemic attack (TIA)    Chronic kidney disease (CKD)    Hypertension    Stented coronary artery    Myocardial infarction    PAD (peripheral artery disease)    HLD (hyperlipidemia)    CAD (coronary artery disease)    Carotid artery disease    BPH (benign prostatic hyperplasia)    Pain in left knee    OA (osteoarthritis)    [place] (hard of hearing)    Chronic anemia    Wound of right foot    BROWN (dyspnea on exertion)    Angiogram, infrapopliteal    Atherectomy, artery, superficial femoral, right    Angioplasty of anterior tibial artery    S/P CABG (coronary artery bypass graft)    H/O arterial bypass of lower limb    History of surgery    WEAKNESS    Fluid overload    [email or web address]      No Known Allergies    aspirin enteric coated 81 milliGRAM(s) Oral daily  atorvastatin 40 milliGRAM(s) Oral at bedtime  buMETAnide 2 milliGRAM(s) Oral <User Schedule>  buMETAnide 2 milliGRAM(s) Oral <User Schedule>  calcitriol   Capsule 0.25 MICROGram(s) Oral daily  chlorhexidine 4% Liquid 1 Application(s) Topical <User Schedule>  clopidogrel Tablet 75 milliGRAM(s) Oral daily  collagenase Ointment 1 Application(s) Topical daily  dextrose 40% Gel 15 Gram(s) Oral once  dextrose 50% Injectable 25 Gram(s) IV Push once  dextrose 50% Injectable 12.5 Gram(s) IV Push once  dextrose 50% Injectable 25 Gram(s) IV Push once  glucagon  Injectable 1 milliGRAM(s) IntraMuscular once  heparin   Injectable 5000 Unit(s) SubCutaneous every 12 hours  insulin lispro (ADMELOG) corrective regimen sliding scale   SubCutaneous three times a day before meals  insulin lispro Injectable (ADMELOG) 3 Unit(s) SubCutaneous three times a day before meals  lactated ringers. 1000 milliLiter(s) IV Continuous <Continuous>  melatonin 5 milliGRAM(s) Oral at bedtime  metoprolol tartrate 50 milliGRAM(s) Oral two times a day  NIFEdipine XL 60 milliGRAM(s) Oral daily  sevelamer carbonate 800 milliGRAM(s) Oral three times a day with meals          T(C): 36.4 (02-28-22 @ 20:19), Max: 36.7 (02-28-22 @ 16:40)  HR: 71 (02-28-22 @ 22:45) (66 - 77)  BP: 141/74 (02-28-22 @ 22:45) (131/72 - 173/80)  RR: 20 (02-28-22 @ 22:45) (15 - 21)  SpO2: 96% (02-28-22 @ 22:45) (92% - 99%)    02-28-22 @ 07:01  -  03-01-22 @ 00:30  --------------------------------------------------------  IN: 0 mL / OUT: 2500 mL / NET: -2500 mL        General:Alert and oriented times 3, not in acute distress   Heart: Regular rate and rhythm, no rubs , murmurs or gallops  Lungs: Clear to auscultation bilaterally, no wheezes, rales, rhonci appreciated  Abdomen: Soft , positive bowel sounds, no tenderness, no distention, no peritoneal signs   Exremites:left Groin- incision clean dry and intact,  warm extremities,  good color, no swelling, motor and sensation , dopplerable at                  9.2    8.28  )-----------( 182      ( 02-27 @ 20:00 )             29.0                9.8    8.93  )-----------( 201      ( 02-27 @ 04:30 )             31.1                    x     |  x     |  x                  Ca: x      BMP:   ----------------------------< x      Mg: x     (02-28-22 @ 02:23)             x      |  x     | x                  Ph: 6.4      LFT:     TPro: 6.9 / Alb: 3.8 / TBili: 0.4 / DBili: x / AST: 13 / ALT: 13 / AlkPhos: 133   (02-27-22 @ 04:30)          PT/INR - ( 27 Feb 2022 20:00 )   PT: 13.10 sec;   INR: 1.14 ratio         PTT - ( 27 Feb 2022 20:00 )  PTT:31.4 sec                    Culture - Abscess with Gram Stain (collected 26 Feb 2022 10:42)  Source: .Abscess r heel  Final Report (28 Feb 2022 21:46):    Culture yields >4 types of aerobic and/or anaerobic bacteria    Call client services within 7 days if further workup is clinically    indicated. including    Moderate Escherichia coli    Moderate Enterobacter cloacae complex    Moderate Enterococcus faecalis    Rare Staphylococcus simulans "Susceptibilities not performed"    Numerous Corynebacterium species "Susceptibilities not performed"    Numerous Prevotella disiens "Susceptibilities not performed"  Organism: Escherichia coli  Enterococcus faecalis (28 Feb 2022 21:42)  Organism: Enterococcus faecalis (28 Feb 2022 21:42)  Organism: Escherichia coli (28 Feb 2022 21:42)     [id number] - Obs Discharge >1 day

## 2022-03-01 NOTE — PROGRESS NOTE ADULT - SUBJECTIVE AND OBJECTIVE BOX
Nephrology progress note    Patient was seen and examined, events over the last 24 h noted .    HD yesterday    Allergies:  No Known Allergies    Hospital Medications:   MEDICATIONS  (STANDING):  aspirin enteric coated 81 milliGRAM(s) Oral daily  atorvastatin 40 milliGRAM(s) Oral at bedtime  buMETAnide 2 milliGRAM(s) Oral <User Schedule>  calcitriol   Capsule 0.25 MICROGram(s) Oral daily  clopidogrel Tablet 75 milliGRAM(s) Oral daily  collagenase Ointment 1 Application(s) Topical daily  dextrose 40% Gel 15 Gram(s) Oral once  dextrose 50% Injectable 25 Gram(s) IV Push once  dextrose 50% Injectable 25 Gram(s) IV Push once  glucagon  Injectable 1 milliGRAM(s) IntraMuscular once  heparin   Injectable 5000 Unit(s) SubCutaneous every 12 hours  insulin lispro (ADMELOG) corrective regimen sliding scale   SubCutaneous three times a day before meals  insulin lispro Injectable (ADMELOG) 3 Unit(s) SubCutaneous three times a day before meals  melatonin 5 milliGRAM(s) Oral at bedtime  metoprolol tartrate 50 milliGRAM(s) Oral two times a day  NIFEdipine XL 60 milliGRAM(s) Oral daily  sevelamer carbonate 800 milliGRAM(s) Oral three times a day with meals        VITALS:  T(F): 97 (03-01-22 @ 14:10), Max: 97.9 (03-01-22 @ 05:39)  HR: 67 (03-01-22 @ 15:30)  BP: 135/75 (03-01-22 @ 15:30)  RR: 17 (03-01-22 @ 15:30)  SpO2: 96% (03-01-22 @ 15:30)  Wt(kg): --    02-28 @ 07:01  -  03-01 @ 07:00  --------------------------------------------------------  IN: 0 mL / OUT: 2500 mL / NET: -2500 mL      Height (cm): 170.2 (03-01 @ 12:17)  Weight (kg): 103.4 (03-01 @ 12:17)  BMI (kg/m2): 35.7 (03-01 @ 12:17)  BSA (m2): 2.14 (03-01 @ 12:17)    PHYSICAL EXAM:  Constitutional: NAD  HEENT: anicteric sclera, oropharynx clear, MMM  Neck: No JVD  Respiratory: CTAB, no wheezes, rales or rhonchi  Cardiovascular: S1, S2, RRR  Gastrointestinal: BS+, soft, NT/ND  Extremities: No cyanosis or clubbing. No peripheral edema  :  No schneider.   Skin: No rashes    LABS:  02-27    136  |  96<L>  |  75<HH>  ----------------------------<  162<H>  4.9   |  20  |  7.8<HH>    Ca    6.8<L>      27 Feb 2022 20:00  Phos  6.4     02-28  Mg     2.2     02-27                            9.2    8.28  )-----------( 182      ( 27 Feb 2022 20:00 )             29.0       Urine Studies:      RADIOLOGY & ADDITIONAL STUDIES:

## 2022-03-01 NOTE — PROGRESS NOTE ADULT - ASSESSMENT
# ESRD on HD   # Hypervolemia  # HTN  # Normocytic anemia / CKD  # MBD  Recommendations:  -  hd  yesterday  - pt is non-oliguric, continue bumex  - phos level noted , on sevelamer, increase 2/2/2  - BP controlled  - appreciate vascularfollow up - will follow

## 2022-03-02 VITALS
SYSTOLIC BLOOD PRESSURE: 155 MMHG | HEART RATE: 74 BPM | TEMPERATURE: 97 F | DIASTOLIC BLOOD PRESSURE: 74 MMHG | RESPIRATION RATE: 18 BRPM

## 2022-03-02 LAB
ALBUMIN SERPL ELPH-MCNC: 3.8 G/DL — SIGNIFICANT CHANGE UP (ref 3.5–5.2)
ALP SERPL-CCNC: 131 U/L — HIGH (ref 30–115)
ALT FLD-CCNC: 9 U/L — SIGNIFICANT CHANGE UP (ref 0–41)
ANION GAP SERPL CALC-SCNC: 20 MMOL/L — HIGH (ref 7–14)
AST SERPL-CCNC: 13 U/L — SIGNIFICANT CHANGE UP (ref 0–41)
BASOPHILS # BLD AUTO: 0.1 K/UL — SIGNIFICANT CHANGE UP (ref 0–0.2)
BASOPHILS NFR BLD AUTO: 1.2 % — HIGH (ref 0–1)
BILIRUB SERPL-MCNC: 0.2 MG/DL — SIGNIFICANT CHANGE UP (ref 0.2–1.2)
BUN SERPL-MCNC: 65 MG/DL — CRITICAL HIGH (ref 10–20)
CALCIUM SERPL-MCNC: 7.5 MG/DL — LOW (ref 8.5–10.1)
CHLORIDE SERPL-SCNC: 98 MMOL/L — SIGNIFICANT CHANGE UP (ref 98–110)
CO2 SERPL-SCNC: 21 MMOL/L — SIGNIFICANT CHANGE UP (ref 17–32)
CREAT SERPL-MCNC: 7.4 MG/DL — CRITICAL HIGH (ref 0.7–1.5)
CRP SERPL-MCNC: 31 MG/L — HIGH
EGFR: 8 ML/MIN/1.73M2 — LOW
EOSINOPHIL # BLD AUTO: 0.17 K/UL — SIGNIFICANT CHANGE UP (ref 0–0.7)
EOSINOPHIL NFR BLD AUTO: 2.1 % — SIGNIFICANT CHANGE UP (ref 0–8)
ERYTHROCYTE [SEDIMENTATION RATE] IN BLOOD: 40 MM/HR — HIGH (ref 0–10)
GLUCOSE BLDC GLUCOMTR-MCNC: 155 MG/DL — HIGH (ref 70–99)
GLUCOSE BLDC GLUCOMTR-MCNC: 172 MG/DL — HIGH (ref 70–99)
GLUCOSE BLDC GLUCOMTR-MCNC: 176 MG/DL — HIGH (ref 70–99)
GLUCOSE SERPL-MCNC: 151 MG/DL — HIGH (ref 70–99)
HCT VFR BLD CALC: 29.3 % — LOW (ref 42–52)
HGB BLD-MCNC: 9.4 G/DL — LOW (ref 14–18)
IMM GRANULOCYTES NFR BLD AUTO: 0.2 % — SIGNIFICANT CHANGE UP (ref 0.1–0.3)
LYMPHOCYTES # BLD AUTO: 1.13 K/UL — LOW (ref 1.2–3.4)
LYMPHOCYTES # BLD AUTO: 13.8 % — LOW (ref 20.5–51.1)
MAGNESIUM SERPL-MCNC: 2.1 MG/DL — SIGNIFICANT CHANGE UP (ref 1.8–2.4)
MCHC RBC-ENTMCNC: 30.1 PG — SIGNIFICANT CHANGE UP (ref 27–31)
MCHC RBC-ENTMCNC: 32.1 G/DL — SIGNIFICANT CHANGE UP (ref 32–37)
MCV RBC AUTO: 93.9 FL — SIGNIFICANT CHANGE UP (ref 80–94)
MONOCYTES # BLD AUTO: 0.67 K/UL — HIGH (ref 0.1–0.6)
MONOCYTES NFR BLD AUTO: 8.2 % — SIGNIFICANT CHANGE UP (ref 1.7–9.3)
NEUTROPHILS # BLD AUTO: 6.12 K/UL — SIGNIFICANT CHANGE UP (ref 1.4–6.5)
NEUTROPHILS NFR BLD AUTO: 74.5 % — SIGNIFICANT CHANGE UP (ref 42.2–75.2)
NRBC # BLD: 0 /100 WBCS — SIGNIFICANT CHANGE UP (ref 0–0)
PLATELET # BLD AUTO: 193 K/UL — SIGNIFICANT CHANGE UP (ref 130–400)
POTASSIUM SERPL-MCNC: 5.1 MMOL/L — HIGH (ref 3.5–5)
POTASSIUM SERPL-SCNC: 5.1 MMOL/L — HIGH (ref 3.5–5)
PROT SERPL-MCNC: 6.9 G/DL — SIGNIFICANT CHANGE UP (ref 6–8)
RBC # BLD: 3.12 M/UL — LOW (ref 4.7–6.1)
RBC # FLD: 14.7 % — HIGH (ref 11.5–14.5)
SARS-COV-2 RNA SPEC QL NAA+PROBE: SIGNIFICANT CHANGE UP
SODIUM SERPL-SCNC: 139 MMOL/L — SIGNIFICANT CHANGE UP (ref 135–146)
WBC # BLD: 8.21 K/UL — SIGNIFICANT CHANGE UP (ref 4.8–10.8)
WBC # FLD AUTO: 8.21 K/UL — SIGNIFICANT CHANGE UP (ref 4.8–10.8)

## 2022-03-02 PROCEDURE — 99233 SBSQ HOSP IP/OBS HIGH 50: CPT

## 2022-03-02 RX ORDER — METRONIDAZOLE 500 MG
1 TABLET ORAL
Qty: 90 | Refills: 0
Start: 2022-03-02 | End: 2022-03-31

## 2022-03-02 RX ORDER — CALCITRIOL 0.5 UG/1
1 CAPSULE ORAL
Qty: 30 | Refills: 0
Start: 2022-03-02 | End: 2022-03-31

## 2022-03-02 RX ORDER — METRONIDAZOLE 500 MG
500 TABLET ORAL THREE TIMES A DAY
Refills: 0 | Status: DISCONTINUED | OUTPATIENT
Start: 2022-03-02 | End: 2022-03-02

## 2022-03-02 RX ORDER — LACTOBACILLUS ACIDOPHILUS 100MM CELL
1 CAPSULE ORAL
Refills: 0 | Status: DISCONTINUED | OUTPATIENT
Start: 2022-03-02 | End: 2022-03-02

## 2022-03-02 RX ORDER — LACTOBACILLUS ACIDOPHILUS 100MM CELL
1 CAPSULE ORAL
Qty: 30 | Refills: 0
Start: 2022-03-02 | End: 2022-03-31

## 2022-03-02 RX ORDER — NIFEDIPINE 30 MG
1 TABLET, EXTENDED RELEASE 24 HR ORAL
Qty: 30 | Refills: 0
Start: 2022-03-02 | End: 2022-03-31

## 2022-03-02 RX ADMIN — Medication 3 UNIT(S): at 18:01

## 2022-03-02 RX ADMIN — Medication 3 UNIT(S): at 08:24

## 2022-03-02 RX ADMIN — Medication 50 MILLIGRAM(S): at 17:31

## 2022-03-02 RX ADMIN — HEPARIN SODIUM 5000 UNIT(S): 5000 INJECTION INTRAVENOUS; SUBCUTANEOUS at 05:24

## 2022-03-02 RX ADMIN — Medication 1 APPLICATION(S): at 11:59

## 2022-03-02 RX ADMIN — SEVELAMER CARBONATE 800 MILLIGRAM(S): 2400 POWDER, FOR SUSPENSION ORAL at 17:33

## 2022-03-02 RX ADMIN — Medication 1: at 16:29

## 2022-03-02 RX ADMIN — Medication 3 UNIT(S): at 12:44

## 2022-03-02 RX ADMIN — Medication 1: at 08:24

## 2022-03-02 RX ADMIN — SEVELAMER CARBONATE 800 MILLIGRAM(S): 2400 POWDER, FOR SUSPENSION ORAL at 08:39

## 2022-03-02 RX ADMIN — HEPARIN SODIUM 5000 UNIT(S): 5000 INJECTION INTRAVENOUS; SUBCUTANEOUS at 17:31

## 2022-03-02 RX ADMIN — Medication 1: at 12:44

## 2022-03-02 RX ADMIN — SEVELAMER CARBONATE 800 MILLIGRAM(S): 2400 POWDER, FOR SUSPENSION ORAL at 13:22

## 2022-03-02 RX ADMIN — Medication 81 MILLIGRAM(S): at 13:22

## 2022-03-02 RX ADMIN — CLOPIDOGREL BISULFATE 75 MILLIGRAM(S): 75 TABLET, FILM COATED ORAL at 13:22

## 2022-03-02 RX ADMIN — Medication 500 MILLIGRAM(S): at 17:30

## 2022-03-02 RX ADMIN — Medication 1 TABLET(S): at 17:31

## 2022-03-02 RX ADMIN — CALCITRIOL 0.25 MICROGRAM(S): 0.5 CAPSULE ORAL at 13:22

## 2022-03-02 NOTE — MEDICAL STUDENT PROGRESS NOTE(EDUCATION) - SUBJECTIVE AND OBJECTIVE BOX
----------Daily Progress Note----------    HISTORY OF PRESENT ILLNESS:  Today is hospital day 9d. Patient is a 61y old Male with a PMXh of CAD, ESRD on HD, HTN, DM type II, and PAD who presents with a chief complaint of dyspnea on exertion and pale appearance. The patient was admitted to medicine with the primary diagnosis of BROWN (dyspnea on exertion) to fluid overload. During the hospital course non-healing right leg ulcer was noted which required angiography, arthrectomy with stent, and surgical debridement.         INTERVAL HOSPITAL COURSE / OVERNIGHT EVENTS:  Patient postop day 1 for surgical debridement Patient was examined and seen at bedside. This morning he is sitting in chair adjacent to bed watching TV. The patient reports no new issues or overnight events. The patient reports good appetite, bowel/urinary movements, and pain control. The pt will be receiving scheduled hemodialysis today.     Review of Systems: Patient denies fever, chills, chest pain, sob, palpations, vomiting, bowel changes, headache, numbness/tingling      <<<<<PAST MEDICAL & SURGICAL HISTORY>>>>>    CAD (coronary artery disease): S/P CABG (coronary artery bypass graft)x4  Diabetes mellitus  Transient ischemic attack (TIA): 2017; 2008  Chronic kidney disease (CKD): Stage IV  Hypertension  Stented coronary artery in 2008  Myocardial infarction: 2012  PAD (peripheral artery disease): S/p bypass left leg  HLD (hyperlipidemia)  BPH (benign prostatic hyperplasia)  Pain in left knee: s/p fall  OA (osteoarthritis)  [place] (hard of hearing)  Chronic anemia  H/O arterial bypass of lower limb Left Lower Extremity (2016)    History of surgery  Left CEA (2017)  Left Pinkie toe Amputation (2014)  CABG x 4 (2012)  Card cath - stent (2008)        ALLERGIES  No Known Allergies      Home Medications:  aspirin 81 mg oral tablet: 1 tab(s) orally once a day (21 Feb 2022 13:33)  furosemide 80 mg oral tablet: 1 tab(s) orally 2 times a day (21 Feb 2022 13:33)  Levemir 100 units/mL subcutaneous solution: 40 unit(s) subcutaneous once a day (at bedtime).  (21 Feb 2022 13:33)  Metoprolol Tartrate 50 mg oral tablet: 1 tab(s) orally 2 times a day (21 Feb 2022 13:33)  Plavix 75 mg oral tablet: 1 tab(s) orally once a day (21 Feb 2022 13:33)  Trulicity Pen: 1 milligram(s) subcutaneous once a week (21 Feb 2022 13:33)        MEDICATIONS  STANDING MEDICATIONS  aspirin enteric coated 81 milliGRAM(s) Oral daily  atorvastatin 40 milliGRAM(s) Oral at bedtime  buMETAnide 2 milliGRAM(s) Oral <User Schedule>  calcitriol   Capsule 0.25 MICROGram(s) Oral daily  clopidogrel Tablet 75 milliGRAM(s) Oral daily  collagenase Ointment 1 Application(s) Topical daily  dextrose 40% Gel 15 Gram(s) Oral once  dextrose 50% Injectable 25 Gram(s) IV Push once  dextrose 50% Injectable 25 Gram(s) IV Push once  glucagon  Injectable 1 milliGRAM(s) IntraMuscular once  heparin   Injectable 5000 Unit(s) SubCutaneous every 12 hours  insulin lispro (ADMELOG) corrective regimen sliding scale   SubCutaneous three times a day before meals  insulin lispro Injectable (ADMELOG) 3 Unit(s) SubCutaneous three times a day before meals  lactobacillus acidophilus 1 Tablet(s) Oral two times a day  melatonin 5 milliGRAM(s) Oral at bedtime  metoprolol tartrate 50 milliGRAM(s) Oral two times a day  NIFEdipine XL 60 milliGRAM(s) Oral daily  sevelamer carbonate 800 milliGRAM(s) Oral three times a day with meals    PRN MEDICATIONS    VITALS:  T(F): 97.1  HR: 68  BP: 150/70  RR: 18  SpO2: 96%    <<<<<LABS>>>>>                        9.4    8.21  )-----------( 193      ( 02 Mar 2022 04:30 )             29.3     03-02    139  |  98  |  65<HH>  ----------------------------<  151<H>  5.1<H>   |  21  |  7.4<HH>    Ca    7.5<L>      02 Mar 2022 04:30  Mg     2.1     03-02    TPro  6.9  /  Alb  3.8  /  TBili  0.2  /  DBili  x   /  AST  13  /  ALT  9   /  AlkPhos  131<H>  03-02    Sedimentation Rate, Erythrocyte: 40 mm/Hr *H* (03-02-22 @ 07:40)    <<<<<RADIOLOGY>>>>>  None Today     <<<<<PHYSICAL EXAM>>>>>  GENERAL: Well developed, well nourished and in no acute distress. Resting comfortably in bed.  HEENT: Normocephalic, atraumatic, mucous membranes moist, EOMI, PERRLA, bilateral sclera anicteric, no conjunctival injection  Neck: Supple, non-tender, no lymphadenopathy.  PULMONARY: Clear to auscultation bilaterally. No rales, rhonchi, or wheezing.  CARDIOVASCULAR: Regular rate and rhythm, S1-S2, no murmurs  GASTROINTESTINAL: Soft, non-tender, non-distended, no guarding.  SKIN/EXTREMITIES: No clubbing; Leg b/l dry flaky;  extremity lower bandaged  NEUROLOGIC/MUSCULOSKELETAL: AOx4, grossly moving all extremities, no focal deficits.      -----------------------------------------------------------------------------------------------------------------------------------------------------------------------------------------------

## 2022-03-02 NOTE — PROGRESS NOTE ADULT - ASSESSMENT
ASSESSMENT & PLAN  60 yo M with PMHx of ESRD on HD M/W/F, Normocytic Anemia of Chronic Disease, DM Type II, Hypertension, DLD, TIA, CAD s/p staged PCI CABG 2012, PVD s/p left below knee popliteal to AT artery reverse saphenous vein graft in 4/2017 and balloon plasty of his left lower extremity bypass graft in 6/2020, and carotid stenosis s/p right carotid endarterectomy in 6/2017 sent in from o/p dialysis (prior to starting HD) for appearing pale. Admitted to inpatient for dyspnea 2/2 fluid overload, improved, still in hospital admission for podiatry procedure due to right heel ulcer osteomyelitis.     # DFU/ Osteomyelitis  - Xray ankle reviewed: posterior heel ulcer with no definitive evidence of osteomyelitis- there is mild periosteal reaction and would need MRI ankle for further evaluation  - Duplex venous reviewed: No DVT  - Duplex arterial reviewed: minimal flow in PT and peroneal arteries.   -Angiogram infrapopliteal, Atherectomy right superficial femoral artery, Balloon angioplasty of Anterior tibial artery, Atherectomy of anterior tibial artery   - s/p debridement on 3/1  - c/w local wound care and long term of Abx as per ID     # ESRD on HD/ Hypervolemia / uncontrolled HTN   - renal diet, euvolemic now   - switched from lasix to PO bumex. nephro recs reviewed: continue bumex,, nifedipine 60mg  - c/w sevelamer TID  - HD as scheduled ( today)   - monitor for fluid overload     # Anemia chronic disease  - On admission Hb 9.9  - monitor h/H, keep Hb above 7.5     # DM2  - takes lantus 40 units at night and trulicity weekly  - monitor FS and start insulin regimen if consistently >180    # DLD  - c/w statin    # CAD s/p staged PCI CABG 2012/ PVD /Carotid stenosis  - s/p left below knee popliteal to AT artery reverse saphenous vein graft in 4/2017 and balloon plasty of his left lower extremity bypass graft in 6/2020   - s/p right carotid endarterectomy in 6/ 2017  - c/w asa 81mg, plavix, and BB    DVT ppx: heparin SC  GI ppx: NA  Diet: dash; fluid restriction  Code Status: Full Code    Dispo: pt is stable for discharge today with nephrology, ID and PMD follow up on long course of Abx and podiatry follow up

## 2022-03-02 NOTE — MEDICAL STUDENT PROGRESS NOTE(EDUCATION) - NS MD HP STUD ASPLAN PLAN FT
#Right leg ulcer  #Osteomyelitis  -pt s/p: angiogram infrapopliteal, Atherectomy right superficial femoral artery, Balloon angioplasty of Anterior tibial artery, Atherectomy of anterior tibial artery and surgical debridement of right leg   - Xray ankle reviewed: posterior heel ulcer with no definitive evidence of osteomyelitis- there is mild periosteal reaction;  MRI ankle: suggest osteomyelitis of posteromedial calcaneal tuberosity  -Osteomyelitis: Cefepime and doxycyline six week course.   -Pod Rec: No further podiatric surgical intervention at this time; Patient stable from podiatry standpoint can follow up with Dr. Pacheco as an out pt 1 week after discharge.    # ESRD on HD  # Fluid Overload  - Nephrology Rec:  - HD today 3h opti 160 2k UF 3l as tolerated   - pt is non-oliguric, continue bumetanide   - Last phos 2/28 elevated 6.4; Continue sevelamer  -Calcitriol  - BP control    # Anemia chronic disease  - On admission Hb 9.9  -Today Hb 9.4  Likely secondary to ESRD      # CAD  # PVD  #DLD  #HTN  -aspirin   -Clopidogrel  - Atorvastatin   -Metoprolol  -Nifedipine     #DM2  -POCT Glucose 3/2/2022: 155  - On Sliding Scale Insulin  - Insulin Lispro     DVT ppx: heparin SC  GI ppx: None  Diet: dash; Sodium & Cholesterol restriction;  fluid restriction  Code Status: Full Code  Dispo: Anticipated   #Right leg ulcer  #Osteomyelitis  -pt s/p: angiogram infrapopliteal, Atherectomy right superficial femoral artery, Balloon angioplasty of Anterior tibial artery, Atherectomy of anterior tibial artery and surgical debridement of right leg   - Xray ankle reviewed: posterior heel ulcer with no definitive evidence of osteomyelitis- there is mild periosteal reaction;  MRI ankle: suggest osteomyelitis of posteromedial calcaneal tuberosity  -Osteomyelitis: Cefepime and doxycyline six week course.   -Pod Rec: No further podiatric surgical intervention at this time; Patient stable from podiatry standpoint can follow up with Dr. Pacheco as an out pt 1 week after discharge.    # ESRD on HD  # Fluid Overload (resolved)  #ESRD on HD   - Nephrology following  - HD schedule: M/W/F  - Noted to be non-oliguric; continue on Bumex 2mg on HD days   - Last phos 2/28 elevated 6.4; Continue sevelamer  - Continue Calcitriol    #Normocytic anemia; likely secondary to ESRD vs Chronic Inflammation   - Baseline hemoglobin around 10  - No signs of acute bleeding  - Hemoglobin today >9     #CAD  #PVD  #DLD  #HTN  - Stable  - Continue on home medication regimen: ASA, Plavix, Lipitor, Metoprolol, Nifedipine     #DM2  - Continue to monitor finger sticks  - a1c 7.2  - Continue on Lispro 3/3/3   - Sliding scale ordered as well     DVT ppx: heparin SC  GI ppx: Not indicated  Diet: dash; Sodium & Cholesterol restriction;  fluid restriction  Code Status: Full Code  Dispo: Anticipated

## 2022-03-02 NOTE — PROGRESS NOTE ADULT - ASSESSMENT
# ESRD on HD   # Hypervolemia  # HTN  # Normocytic anemia / CKD  # MBD  Recommendations:  - HD today 3h opti 160 2k UF 3l as tolerated   - pt is non-oliguric, continue bumex  - phos level noted , on sevelamer, increase 2/2/2  - BP controlled  - appreciate vascular - podiatry sp debridement wound right foot yesterday    - will follow

## 2022-03-02 NOTE — MEDICAL STUDENT PROGRESS NOTE(EDUCATION) - NS MD HP STUD ASPLAN ASSES FT
Patient is a 61y old Male with a PMXh of CAD, ESRD on HD, HTN, DM type II, and PAD who presents with a chief complaint of dyspnea on exertion and pale appearance. The patient was admitted to medicine with the primary diagnosis of BROWN (dyspnea on exertion) to fluid overload. During the hospital course non-healing right leg ulcer was noted which required angiography, arthrectomy with stent, and surgical debridement and found to have osteomyelitis on MRI.

## 2022-03-02 NOTE — PROGRESS NOTE ADULT - TIME BILLING
direct pt's care, communication with medical team and chart review
I have personally seen and examined this patient.  I have reviewed all pertinent clinical information and reviewed all relevant imaging and diagnostic studies personally.   I counseled the patient about diagnostic testing and treatment plan. All questions were answered.  I discussed recommendations with the primary team.
direct pt's care, communication with medical team, chart review

## 2022-03-02 NOTE — PROGRESS NOTE ADULT - SUBJECTIVE AND OBJECTIVE BOX
61y old  Male who presents with a chief complaint of right heel ulcer, consulted by podiatry, underwent debridement  and angiogram atherectomy balloon angioplasty consulted by ID, long term course of Abx recommended for  OM and DFU.  Today pt is comfortable, denies any complaints, agreed for VNS at home, asking about discharge.     Vital Signs Last 24 Hrs  T(C): 35.6 (02 Mar 2022 13:49), Max: 36.2 (02 Mar 2022 05:00)  T(F): 96.1 (02 Mar 2022 13:49), Max: 97.1 (02 Mar 2022 05:00)  HR: 87 (02 Mar 2022 13:49) (60 - 87)  BP: 152/69 (02 Mar 2022 13:49) (104/57 - 152/69)  BP(mean): 76 (01 Mar 2022 20:33) (76 - 76)  RR: 18 (02 Mar 2022 13:49) (18 - 18)    PHYSICAL EXAMINATION:  GEN:  nad, obese, pleasant   HEENT:  eomi. ncat  PULM:  b/l bs.  clear.  no wheezing. no crackles or rales.   CARD: regular. s1, s2.  no murmurs.   ABD: +bs. ntnd  EXT:  no new rashes, statin dermatitis on LE b/l , dressing noted on right  foot   NEURO:  no new focal deficits.     LABS:                                  9.4    8.21  )-----------( 193      ( 02 Mar 2022 04:30 )             29.3   03-02    139  |  98  |  65<HH>  ----------------------------<  151<H>  5.1<H>   |  21  |  7.4<HH>    Ca    7.5<L>      02 Mar 2022 04:30  Mg     2.1     03-02    TPro  6.9  /  Alb  3.8  /  TBili  0.2  /  DBili  x   /  AST  13  /  ALT  9   /  AlkPhos  131<H>  03-02      Culture yields >4 types of aerobic and/or anaerobic bacteria   Call client services within 7 days if further workup is clinically   indicated. including   Moderate Escherichia coli   Moderate Enterobacter cloacae complex   Moderate Enterococcus faecalis   Rare Staphylococcus simulans "Susceptibilities not performed"   Numerous Corynebacterium species "Susceptibilities not performed"   Numerous Prevotella disiens "Susceptibilities not performed"   Organism Identification: Escherichia coli   Enterococcus faecalis   Organism: Escherichia coli   Organism: Enterococcus faecalis     MEDICATIONS  (STANDING):  aspirin enteric coated 81 milliGRAM(s) Oral daily  atorvastatin 40 milliGRAM(s) Oral at bedtime  buMETAnide 2 milliGRAM(s) Oral <User Schedule>  calcitriol   Capsule 0.25 MICROGram(s) Oral daily  clopidogrel Tablet 75 milliGRAM(s) Oral daily  collagenase Ointment 1 Application(s) Topical daily  dextrose 40% Gel 15 Gram(s) Oral once  dextrose 50% Injectable 25 Gram(s) IV Push once  dextrose 50% Injectable 25 Gram(s) IV Push once  glucagon  Injectable 1 milliGRAM(s) IntraMuscular once  heparin   Injectable 5000 Unit(s) SubCutaneous every 12 hours  insulin lispro (ADMELOG) corrective regimen sliding scale   SubCutaneous three times a day before meals  insulin lispro Injectable (ADMELOG) 3 Unit(s) SubCutaneous three times a day before meals  lactobacillus acidophilus 1 Tablet(s) Oral two times a day  melatonin 5 milliGRAM(s) Oral at bedtime  metoprolol tartrate 50 milliGRAM(s) Oral two times a day  metroNIDAZOLE    Tablet 500 milliGRAM(s) Oral three times a day  NIFEdipine XL 60 milliGRAM(s) Oral daily  sevelamer carbonate 800 milliGRAM(s) Oral three times a day with meals    MEDICATIONS  (PRN):

## 2022-03-02 NOTE — PROGRESS NOTE ADULT - SUBJECTIVE AND OBJECTIVE BOX
Podiatry Progress Note    Subjective:  SCHWABACHER, LAWRENCE is a  61y Male.   Seen bedside.   Patient is a 61y old  Male who presents with a chief complaint of dyspnea fluid overload (01 Mar 2022 18:03)      Past Medical History and Surgical History  PAST MEDICAL & SURGICAL HISTORY:  CAD (coronary artery disease)    S/P CABG (coronary artery bypass graft)  x4    Diabetes mellitus    Transient ischemic attack (TIA)  2017; 2008    Chronic kidney disease (CKD)  Stage IV    Hypertension    Stented coronary artery  in 2008    Myocardial infarction  2012    PAD (peripheral artery disease)  S/p bypass left leg    HLD (hyperlipidemia)    BPH (benign prostatic hyperplasia)    Pain in left knee  s/p fall    OA (osteoarthritis)    [place] (hard of hearing)    Chronic anemia    S/P CABG (coronary artery bypass graft)  2012    H/O arterial bypass of lower limb  Left Lower Extremity (2016)    History of surgery  Left CEA (2017)  Left Pinkie toe Amputation (2014)  CABG x 4 (2012)  Card cath - stent (2008)           Objective:  Vital Signs Last 24 Hrs  T(C): 36.2 (02 Mar 2022 05:00), Max: 36.4 (01 Mar 2022 12:09)  T(F): 97.1 (02 Mar 2022 05:00), Max: 97.6 (01 Mar 2022 12:09)  HR: 64 (02 Mar 2022 05:56) (60 - 70)  BP: 118/56 (02 Mar 2022 05:56) (104/57 - 135/75)  BP(mean): 76 (01 Mar 2022 20:33) (76 - 76)  RR: 18 (02 Mar 2022 05:56) (14 - 18)  SpO2: 96% (01 Mar 2022 15:30) (94% - 97%)                Physical Exam - Lower Extremity Focused:   #Right Lower Extremity  Derm:   Open Right Plantar Heel Ulcer   Wound Probes to Soft Tissue Bone w/ Minimal Drainage; Wound Base Fibrotic. Wound margins well demarcated. Periwound: wound margins well demarcated. Minimal erythema.   Foot is warm, capillary refill is instant to the toes   Mild mottling of skin to foot & distal leg    Vascular: DP and PT Pulses Diminished; Foot is Warm to Warm to the touch   Neuro: Protective Sensation Diminished / Moderately Neuropathic   MSK: No Pain On Palpation at Wound Site     Assessment:  Right Plantar Heel Ulcer - Probes to Soft Tissue  s/p excisional debridement of right heel ulcer 3/1/22 DOS.     Plan:  Chart reviewed and Patient evaluated. All Questions and Concerns Addressed and Answered  Discussed diagnosis and treatment with patient  Wound Flushed w/ NS; Wound Dressed w/ Santyl, Packed w/ Saline-Soaked Gauze / DSD / Kerlix   Local Wound Care; As Stated Above   ESR 15, CRP 16 (2/22/22)  Wound Culture Obtained; Sent to Pathology Lab; will f/u results  XR Imaging Right Foot; Consider MRI of the ankle for further evaluation if clinically warranted.   MRI-Right Ankle; OM posteromedial calcaneal tuberosity  Lower Extremity Arterial Duplex B/L; Left-patent bypass with no evidence of stenosis is normal flow in distal anterior tibial artery. Minimal flow in negative posterior tibial and peroneal arteries. Vascular; RLE angiogram Monday 2/28  Abx per ID.   No further podiatric surgical intervention at this time.   Patient stable from podiatry standpoint can follow up with Dr. Pacheco as an out pt 1 week after discharge.   Discussed Plan w/ Attending

## 2022-03-02 NOTE — PROGRESS NOTE ADULT - PROVIDER SPECIALTY LIST ADULT
Internal Medicine
Nephrology
Podiatry
Internal Medicine
Internal Medicine
Nephrology
Podiatry
Hospitalist
Internal Medicine
Vascular Surgery
Hospitalist
Infectious Disease

## 2022-03-02 NOTE — PROGRESS NOTE ADULT - SUBJECTIVE AND OBJECTIVE BOX
Nephrology progress note  Patient is seen and examined, events over the last 24 h noted .  lying in bed comfortable  denied chest pain no SOB   No other complaints     Allergies:  No Known Allergies    Hospital Medications:   MEDICATIONS  (STANDING):  aspirin enteric coated 81 milliGRAM(s) Oral daily  atorvastatin 40 milliGRAM(s) Oral at bedtime  buMETAnide 2 milliGRAM(s) Oral <User Schedule>  calcitriol   Capsule 0.25 MICROGram(s) Oral daily  clopidogrel Tablet 75 milliGRAM(s) Oral daily  collagenase Ointment 1 Application(s) Topical daily  glucagon  Injectable 1 milliGRAM(s) IntraMuscular once  heparin   Injectable 5000 Unit(s) SubCutaneous every 12 hours  insulin lispro (ADMELOG) corrective regimen sliding scale   SubCutaneous three times a day before meals  insulin lispro Injectable (ADMELOG) 3 Unit(s) SubCutaneous three times a day before meals  melatonin 5 milliGRAM(s) Oral at bedtime  metoprolol tartrate 50 milliGRAM(s) Oral two times a day  NIFEdipine XL 60 milliGRAM(s) Oral daily  sevelamer carbonate 800 milliGRAM(s) Oral three times a day with meals        VITALS:  T(F): 97.1 (03-02-22 @ 05:00), Max: 97.6 (03-01-22 @ 12:09)  HR: 64 (03-02-22 @ 05:56)  BP: 118/56 (03-02-22 @ 05:56)  RR: 18 (03-02-22 @ 05:56)  SpO2: 96% (03-01-22 @ 15:30)      02-28 @ 07:01  -  03-01 @ 07:00  --------------------------------------------------------  IN: 0 mL / OUT: 2500 mL / NET: -2500 mL      Height (cm): 170.2 (03-01 @ 12:17)  Weight (kg): 106.9 (03-02 @ 06:56)  BMI (kg/m2): 36.9 (03-02 @ 06:56)  BSA (m2): 2.17 (03-02 @ 06:56)    PHYSICAL EXAM:  Constitutional: NAD  Neck: No JVD  Respiratory: CTAB,   Cardiovascular: S1, S2, RRR  Gastrointestinal: BS+, soft, NT/ND  Extremities: No cyanosis or clubbing. No peripheral edema  :  No schneider.   Skin: No rashes    LABS:            Urine Studies:        PTH -- (Ca 7.5)      [02-26-22 @ 16:00]   312  Vitamin D (25OH) 21      [02-26-22 @ 16:00]  HbA1c 5.8      [01-30-20 @ 06:46]    HBsAb <3.0      [12-29-19 @ 17:44]  HBsAb Nonreact      [12-29-19 @ 17:44]  HBsAg Nonreact      [12-29-19 @ 17:44]  HBcAb Nonreact      [12-29-19 @ 17:44]  HCV 0.10, Nonreact      [12-29-19 @ 17:44]        RADIOLOGY & ADDITIONAL STUDIES:

## 2022-03-02 NOTE — MEDICAL STUDENT PROGRESS NOTE(EDUCATION) - NS MD HP STUD SUPERVISOR COMMENTS FT
Senior Resident Addendum    Patient seen and examined independently. Agree with Medical student note which I have reviewed and edited appropriately. D/c planning for patient.

## 2022-03-03 NOTE — CDI QUERY NOTE - NSCDIOTHERTXTBX_GEN_ALL_CORE_HH
CLINICAL INDICATORS  Previous Encounter: 1/4/20 Discharge Note Provider: Acute HFpEF    2/21 H&P Adult: Does this patient have a history of or has been diagnosed with heart failure? no.    2/22 Progress Note Adult-Hospitalist Attending: ESRD on HD / Fluid Overload/ acute on chronic diastolic CHF .. - fluid restriction 1000 ml in 24 hours, renal diet - intake and monitor monitoring - c/w Bumex     3/2 Progress Note Adult-Hospitalist Attending: Admitted to inpatient for dyspnea 2/2 fluid overload, improved, still in hospital admission for podiatry procedure due to right heel ulcer osteomyelitis …  ESRD on HD/ Hypervolemia    3/2 Discharge Note Provider: sent in from o/p dialysis (prior to starting HD) because he appeared pale, feeling short of breath at home. At the ED, patient had high /101, trops 0.19 which was lower than previous, and a BNP > 70K. Patient also endorsed that he was only taking 40mg lasix instead of 80mg because of mistake in understanding. Patient had HD session on day of admission. Nephrology was consulted and started patient on bumex regimen. Patient was admitted to general medicine for shortness of breath 2/2 fluid overload. His dyspnea improved at the hospital. … PRINCIPAL DISCHARGE DIAGNOSIS: BROWN (dyspnea on exertion). Assessment and Plan of Treatment: You presented to the hospital from outpatient dialysis. You noted you were short of breath due to fluid overload. We adjusted your medications to bumex pills to help with removing fluid.    2/24 Echo: Summary: 1. Left ventricular ejection fraction, by visual estimation, is 45 to 50%. 2. Mildly decreased global left ventricular systolic function.3. Mildly increased LV wall thickness.4. Spectral Doppler shows restrictive pattern of left ventricular myocardial filling (Grade III diastolic dysfunction). 5. Moderately reduced RV systolic function.    2/21 Chest xray: Impression: Left pleural effusion/opacity    Based on your professional judgment and the clinical indicators, can the documentation of CHF noted in attending progress notes on 2/22 be further clarified below?  • Acute on chronic diastolic CHF exacerbation was evaluated, ruled in, and resolved  • Acute on chronic diastolic/systolic CHF exacerbation was evaluated, ruled in, and resolved  • Acute on chronic systolic CHF exacerbation was evaluated, ruled in, and resolved  • CHF ruled out, fluid overload only  • Other (please specify):  • Unable to determine     Thank you,  Beatris Mendoza RN [facility] CCDS  [phone]

## 2022-03-04 LAB — SURGICAL PATHOLOGY STUDY: SIGNIFICANT CHANGE UP

## 2022-03-07 NOTE — CHART NOTE - NSCHARTNOTESELECT_GEN_ALL_CORE
Event Note
Event Note
Vascular Surgery PreOp/Event Note
ANESTHESIA to PACU NOTE/Transfer Note
Event Note
Off Service Note
Transfer Note

## 2022-03-07 NOTE — CHART NOTE - NSCHARTNOTEFT_GEN_A_CORE
ANESTHESIA to PACU NOTE      ____ Intubated  TV:______       Rate: ______      FiO2: ______    __x__ Patent Airway    __x__ Full return of protective reflexes    ____ Full recovery from anesthesia / sedation to baseline status    Vitals:  HR 68  /68  RR 15  O2sat. 94%  Temp: 36.4C      Mental Status:  _x___ Awake   ___x__ Alert   _____ Drowsy   _____ Sedated    Nausea/Vomiting: ____ Yes, See Post - Op Orders      __x__ No    Pain Scale (0-10): _____    Treatment: __x__ None    ____ See Post - Op/PCA Orders    Post - Operative Fluids:   ____ Oral   __x__ See Post - Op Orders    Plan:  Discharge to:   ____Home       __x___Floor      _____Critical Care    _____ Other:_________________    Comments: s/p IV sedation. No anesthesia complications. Pt's condition is stable in PACU. Full report is given to PACU RN.
Calcium level noted.  Patient with ESRD / phosphorus level noted, on binders  send vit d , pth levels , f/u
PACU ANESTHESIA ADMISSION NOTE      Procedure: I and D Right Foot            Post op diagnosis:      ____  Intubated  TV:______       Rate: ______      FiO2: ______    __x__  Patent Airway    __x__  Full return of protective reflexes  x  ____  Full recovery from anesthesia / back to baseline status    Vitals:  T 97.5  HR 69  RR 18  /64    Mental Status:  __x__ Awake   _____ Alert   _____ Drowsy   _____ Sedated    Nausea/Vomiting:  _x___ NO  ______Yes,   See Post - Op Orders          Pain Scale (0-10): 0 _____    Treatment: ____ None    ____ See Post - Op/PCA Orders    Post - Operative Fluids:   ____ Oral   ____ xSee Post - Op Orders    Plan: Discharge:   ____Home       ___x__Floor     _____Critical Care    _____  Other:_________________    Comments:
Pt was treated for hypervolemia ( multifactorial ) including  Acute on chronic diastolic/systolic CHF exacerbation was  ruled in, and resolved
Dressing instructions:     30rolls  Kerlex  30 rolls ACE  4 boxes 4x4 gauze  2 boxes Xeroform.    Xeroform, 4x4 gauze, Kerlex, ACE to the right posterior heel q 48 hours.   Request VNS.     Podiatry.   [phone].
Pre-Op Note    61y Male  Location: [facility] [id number] 019 A  22 @ 15:19    Procedure: RLE Angiogram, possible endovascular revascularization    Diet: NPOmn, IVF    Pre-OP Labs: CBC, BMP, Mg, Phos, T&S, INR/PTT/PT, COVID Test today    Subjective:   Patient seen bedside today, in NAD.  B/L LE in dressings placed by podiatry team, c/d/i.  Pain well controlled.  Discussed plans for procedure tomorrow.                          9.8    8.93  )-----------( 201      ( 2022 04:30 )             31.1         135  |  95<L>  |  66<HH>  ----------------------------<  126<H>  4.8   |  23  |  6.6<HH>    Ca    7.4<L>      2022 04:30  Mg     2.2         TPro  6.9  /  Alb  3.8  /  TBili  0.4  /  DBili  x   /  AST  13  /  ALT  13  /  AlkPhos  133<H>            COVID: COVID-19 PCR: NotDetec (2022 11:50)  COVID-19 PCR: NotDetec (2022 10:25)    EK Lead ECG:   Ventricular Rate 66 BPM    Atrial Rate 66 BPM    P-R Interval 180 ms    QRS Duration 92 ms    Q-T Interval 464 ms    QTC Calculation(Bazett) 486 ms    P Axis 39 degrees    R Axis 63 degrees    T Axis 151 degrees    Diagnosis Line Normal sinus rhythm  Nonspecific T wave abnormality  Prolonged QT  Abnormal ECG    Confirmed by Ramirez Garcia ([id number]) on 2022 9:10:38 AM (22 @ 08:51)    CXR:  Xray Chest 1 View-PORTABLE IMMEDIATE:   ACC: [id number] EXAM:  XR CHEST PORTABLE IMMED 1V                          PROCEDURE DATE:  2022          INTERPRETATION:  CLINICAL INDICATION:  Shortness of breath    COMPARISON: Chest radiograph dated 2021    TECHNIQUE: Frontal radiograph the chest.    FINDINGS:    Support devices: None.    Cardiac/mediastinum/hilum: Stable.    Lung parenchyma/Pleura: Left pleural effusion/opacity. There is no   pneumothorax.    Skeleton/soft tissues: Stable.    IMPRESSION:    Left pleural effusion/opacity.    --- End of Report ---            MJ WAN MD; Attending Radiologist  This document has been electronically signed. 2022 12:45PM (22 @ 09:41)
Sx Scheduled for Tuesday 3/1@ 12:30pm    Excisional debridement of soft tissue and bone right foot.  Request medical clearance prior to OR.   Request T&S and COAG studies 2/28  Please make pt NPO MN 2/28  Please optimize pt lab values prior to OR.

## 2022-03-10 DIAGNOSIS — N18.6 END STAGE RENAL DISEASE: ICD-10-CM

## 2022-03-10 DIAGNOSIS — M86.9 OSTEOMYELITIS, UNSPECIFIED: ICD-10-CM

## 2022-03-10 DIAGNOSIS — Z79.02 LONG TERM (CURRENT) USE OF ANTITHROMBOTICS/ANTIPLATELETS: ICD-10-CM

## 2022-03-10 DIAGNOSIS — Z86.73 PERSONAL HISTORY OF TRANSIENT ISCHEMIC ATTACK (TIA), AND CEREBRAL INFARCTION WITHOUT RESIDUAL DEFICITS: ICD-10-CM

## 2022-03-10 DIAGNOSIS — I25.2 OLD MYOCARDIAL INFARCTION: ICD-10-CM

## 2022-03-10 DIAGNOSIS — N40.0 BENIGN PROSTATIC HYPERPLASIA WITHOUT LOWER URINARY TRACT SYMPTOMS: ICD-10-CM

## 2022-03-10 DIAGNOSIS — R06.09 OTHER FORMS OF DYSPNEA: ICD-10-CM

## 2022-03-10 DIAGNOSIS — R77.8 OTHER SPECIFIED ABNORMALITIES OF PLASMA PROTEINS: ICD-10-CM

## 2022-03-10 DIAGNOSIS — E78.5 HYPERLIPIDEMIA, UNSPECIFIED: ICD-10-CM

## 2022-03-10 DIAGNOSIS — Z79.82 LONG TERM (CURRENT) USE OF ASPIRIN: ICD-10-CM

## 2022-03-10 DIAGNOSIS — E11.22 TYPE 2 DIABETES MELLITUS WITH DIABETIC CHRONIC KIDNEY DISEASE: ICD-10-CM

## 2022-03-10 DIAGNOSIS — E11.69 TYPE 2 DIABETES MELLITUS WITH OTHER SPECIFIED COMPLICATION: ICD-10-CM

## 2022-03-10 DIAGNOSIS — I25.10 ATHEROSCLEROTIC HEART DISEASE OF NATIVE CORONARY ARTERY WITHOUT ANGINA PECTORIS: ICD-10-CM

## 2022-03-10 DIAGNOSIS — I50.43 ACUTE ON CHRONIC COMBINED SYSTOLIC (CONGESTIVE) AND DIASTOLIC (CONGESTIVE) HEART FAILURE: ICD-10-CM

## 2022-03-10 DIAGNOSIS — Z95.820 PERIPHERAL VASCULAR ANGIOPLASTY STATUS WITH IMPLANTS AND GRAFTS: ICD-10-CM

## 2022-03-10 DIAGNOSIS — Z95.5 PRESENCE OF CORONARY ANGIOPLASTY IMPLANT AND GRAFT: ICD-10-CM

## 2022-03-10 DIAGNOSIS — E11.621 TYPE 2 DIABETES MELLITUS WITH FOOT ULCER: ICD-10-CM

## 2022-03-10 DIAGNOSIS — E66.01 MORBID (SEVERE) OBESITY DUE TO EXCESS CALORIES: ICD-10-CM

## 2022-03-10 DIAGNOSIS — Z99.2 DEPENDENCE ON RENAL DIALYSIS: ICD-10-CM

## 2022-03-10 DIAGNOSIS — D63.8 ANEMIA IN OTHER CHRONIC DISEASES CLASSIFIED ELSEWHERE: ICD-10-CM

## 2022-03-10 DIAGNOSIS — I13.2 HYPERTENSIVE HEART AND CHRONIC KIDNEY DISEASE WITH HEART FAILURE AND WITH STAGE 5 CHRONIC KIDNEY DISEASE, OR END STAGE RENAL DISEASE: ICD-10-CM

## 2022-03-10 DIAGNOSIS — L97.416 NON-PRESSURE CHRONIC ULCER OF RIGHT HEEL AND MIDFOOT WITH BONE INVOLVEMENT WITHOUT EVIDENCE OF NECROSIS: ICD-10-CM

## 2022-03-10 DIAGNOSIS — Z89.422 ACQUIRED ABSENCE OF OTHER LEFT TOE(S): ICD-10-CM

## 2022-03-10 DIAGNOSIS — E11.51 TYPE 2 DIABETES MELLITUS WITH DIABETIC PERIPHERAL ANGIOPATHY WITHOUT GANGRENE: ICD-10-CM

## 2022-03-10 DIAGNOSIS — Z95.1 PRESENCE OF AORTOCORONARY BYPASS GRAFT: ICD-10-CM

## 2022-03-15 ENCOUNTER — APPOINTMENT (OUTPATIENT)
Dept: NEUROLOGY | Facility: CLINIC | Age: 62
End: 2022-03-15
Payer: MEDICARE

## 2022-03-15 PROCEDURE — 99214 OFFICE O/P EST MOD 30 MIN: CPT

## 2022-03-15 NOTE — PHYSICAL EXAM
[FreeTextEntry1] : He has cyanotic finger nails but not tongue. 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	Level of Consciousness	 0 \par 1 b.          LOC Questions	 0 \par 1 c.	LOC Commands	 0 \par 2.	Best Gaze	 0 \par 3.	Visual	                 0 \par 4.	Facial Palsy              0 \par 5 a.	Motor Arm - Left	 1 \par 5 b.	Motor Arm - Right	 0 \par 6 a.	Motor Leg - Left	 0 \par 6 b.	Motor Leg - Right	 0 \par 7.	Limb Ataxia	 1 \par 8.	Sensory	                 0 \par 9.	Language	 0 \par 10.	Dysarthria	 0 \par 11.	Extinction and Inattention 	 0 \par __________________________________________ \par \par TOTAL	 2 \par \par mRS: 0 No symptoms at all \par \par \par Neurologic Exam: \par \par Mental status: Awake, alert and oriented x 4. Recent and remote memory intact.  \par \par Language: Follows all commands. Naming, repetition and comprehension intact. Attention/concentration intact. No dysarthria, no aphasia. Fund of knowledge appropriate.  \par \par Cranial nerves: Pupils equally round and reactive to light, visual fields full, no nystagmus, EOMI, face symmetric, hearing intact bilaterally, palate elevation symmetric, tongue was midline, sternocleidomastoid/ shoulder shrug strength bilaterally 5/5.  \par \par Motor: No drifting in all extremities. Normal bulk and tone, strength RUE 5/5, LUE 5/5, RLE 5/5, LLE 5-/5. Atrophy in thenar muscle on left > right\par \par Sensation: Intact to light touch.absent vibration in both ankles, loss of light tough to upper calves b/l, absent NABEEL in toes\par \par DTR absent in LE and trace biceps b/l\par \par Coordination: No dysmetria on finger-to-nose and heel-to-shin.  \par \par Gait: mildly limping since he has had a vascular graft on the left leg.\par

## 2022-03-15 NOTE — HISTORY OF PRESENT ILLNESS
[FreeTextEntry1] : Patient is 61 yo M with extensive CV risk factors, ESRD, PAD and acute ischemic infarcts in the Right posterior frontal parietal region in 2019 who presents for follow up of symptomatic bilateral V4 severe stenosis that was evaluated with MRA NOVA scanning.  He initially presented to the ED with transient dizziness on 5/2021. While his brain MRI was negative for acute stroke his CTA showed bilateral V4 severe stenosis.  He was last seen in clinic on 6/1/2020 for evaluation for possible recanalization of the vertebral arteries. \par \par Today he presents following recent hopsitalization for ~7 days being discharged on 3/2/2022 for edema and stenosis of LE arteries.  He was s/p angioplasty by vascular surgery.  He is complaining mostly of dizziness/imbalance and difficulty walking.  His hgba1c has been coming down and last value was 7.3 (previous was 6.8).  He has a healing ulcer in the right foot.\par He is scheduled to have repeat MR NOVA scan in June 2022.\par \par MRA NOVA on 6/22/21 showed nonvisualization of the proximal left V4 segment of the vertebral artery and the following:\par Right vertebral artery equals 241 mL/m \par Left vertebral artery equals 80 mL/m \par Basilar artery equals 194 ml/min\par Right PCA equals 80 mL/min\par Left PCA equals 89 mL/min\par RIGHT P-comm, ICA to PCA flow direction of 56 ml/min. Left PCOM is not visualized/reported.\par \par \par \par

## 2022-03-15 NOTE — REVIEW OF SYSTEMS
[Negative] : Respiratory [As Noted in HPI] : as noted in HPI [Arthralgias] : arthralgias [Joint Pain] : joint pain

## 2022-03-15 NOTE — ASSESSMENT
[FreeTextEntry1] : Patient is 63 yo M, never smoker, w/ extensive CV risk factors, ESRD, PAD and acute ischemic infarcts in the Right posterior frontal parietal region in 2019 who presents for follow up of symptomatic bilateral V4 severe stenosis that was recently evaluated with MRA NOVA scanning.  He was last seen in clinic on 6/1/2020 for evaluation for possible recanalization of the vertebral arteries. MRA NOVA on 6/22/21 showed nonvisualization of the proximal left V4 segment of the vertebral artery, BA 194ml/min, Right PCA 80 mL/m, Left PCA 89 mL/m,  mL/min, LVA 80mL/min. There is sufficient flow to the BA and PCAs for his age, per this NOVA scan. Today, patient presents to clinic with no new neurological complaints. He denies walking issues or SOB on exertion. He also denies LUE fatigue or any Raynaud's-like issues. \par \par PLAN:\par - Repeat MR NOVA scan in May 2022\par - Follow up with neuroendovascular in May/June 2022\par - Continue aspirin and plavix and if progression of disease intracranially may consider switching to Brilinta\par - Continue with glycemic and lipid control with PMD\par - ENcouraged exercise and diet\par - f/u in 6 months\par \par \par \par \par \par \par

## 2022-05-23 RX ORDER — DIAZEPAM 5 MG/1
5 TABLET ORAL
Qty: 2 | Refills: 0 | Status: ACTIVE | COMMUNITY
Start: 2022-05-23 | End: 1900-01-01

## 2022-07-01 ENCOUNTER — APPOINTMENT (OUTPATIENT)
Dept: VASCULAR SURGERY | Facility: CLINIC | Age: 62
End: 2022-07-01
Payer: MEDICARE

## 2022-07-01 VITALS — WEIGHT: 200 LBS | HEIGHT: 71 IN | BODY MASS INDEX: 28 KG/M2

## 2022-07-01 VITALS — SYSTOLIC BLOOD PRESSURE: 141 MMHG | DIASTOLIC BLOOD PRESSURE: 76 MMHG

## 2022-07-01 PROCEDURE — 93880 EXTRACRANIAL BILAT STUDY: CPT

## 2022-07-01 PROCEDURE — 93926 LOWER EXTREMITY STUDY: CPT

## 2022-07-01 PROCEDURE — 99214 OFFICE O/P EST MOD 30 MIN: CPT

## 2022-07-18 LAB
PSA FREE FLD-MCNC: 37 %
PSA FREE SERPL-MCNC: 2.96 NG/ML
PSA SERPL-MCNC: 8.06 NG/ML

## 2022-07-21 ENCOUNTER — APPOINTMENT (OUTPATIENT)
Dept: UROLOGY | Facility: CLINIC | Age: 62
End: 2022-07-21

## 2022-08-09 ENCOUNTER — RESULT REVIEW (OUTPATIENT)
Age: 62
End: 2022-08-09

## 2022-08-09 ENCOUNTER — OUTPATIENT (OUTPATIENT)
Dept: OUTPATIENT SERVICES | Facility: HOSPITAL | Age: 62
LOS: 1 days | Discharge: HOME | End: 2022-08-09

## 2022-08-09 VITALS
HEART RATE: 80 BPM | RESPIRATION RATE: 16 BRPM | OXYGEN SATURATION: 99 % | DIASTOLIC BLOOD PRESSURE: 62 MMHG | SYSTOLIC BLOOD PRESSURE: 102 MMHG | HEIGHT: 71 IN | WEIGHT: 188.94 LBS | TEMPERATURE: 98 F

## 2022-08-09 DIAGNOSIS — Z01.818 ENCOUNTER FOR OTHER PREPROCEDURAL EXAMINATION: ICD-10-CM

## 2022-08-09 DIAGNOSIS — I77.0 ARTERIOVENOUS FISTULA, ACQUIRED: Chronic | ICD-10-CM

## 2022-08-09 DIAGNOSIS — I65.23 OCCLUSION AND STENOSIS OF BILATERAL CAROTID ARTERIES: ICD-10-CM

## 2022-08-09 DIAGNOSIS — Z95.1 PRESENCE OF AORTOCORONARY BYPASS GRAFT: Chronic | ICD-10-CM

## 2022-08-09 DIAGNOSIS — Z95.828 PRESENCE OF OTHER VASCULAR IMPLANTS AND GRAFTS: Chronic | ICD-10-CM

## 2022-08-09 DIAGNOSIS — Z98.890 OTHER SPECIFIED POSTPROCEDURAL STATES: Chronic | ICD-10-CM

## 2022-08-09 LAB
A1C WITH ESTIMATED AVERAGE GLUCOSE RESULT: 6.2 % — HIGH (ref 4–5.6)
ALBUMIN SERPL ELPH-MCNC: 4.8 G/DL — SIGNIFICANT CHANGE UP (ref 3.5–5.2)
ALP SERPL-CCNC: 149 U/L — HIGH (ref 30–115)
ALT FLD-CCNC: 27 U/L — SIGNIFICANT CHANGE UP (ref 0–41)
ANION GAP SERPL CALC-SCNC: 21 MMOL/L — HIGH (ref 7–14)
APTT BLD: 31.5 SEC — SIGNIFICANT CHANGE UP (ref 27–39.2)
AST SERPL-CCNC: 28 U/L — SIGNIFICANT CHANGE UP (ref 0–41)
BASOPHILS # BLD AUTO: 0.07 K/UL — SIGNIFICANT CHANGE UP (ref 0–0.2)
BASOPHILS NFR BLD AUTO: 1 % — SIGNIFICANT CHANGE UP (ref 0–1)
BILIRUB SERPL-MCNC: 0.3 MG/DL — SIGNIFICANT CHANGE UP (ref 0.2–1.2)
BUN SERPL-MCNC: 40 MG/DL — HIGH (ref 10–20)
CALCIUM SERPL-MCNC: 9.7 MG/DL — SIGNIFICANT CHANGE UP (ref 8.5–10.1)
CHLORIDE SERPL-SCNC: 91 MMOL/L — LOW (ref 98–110)
CO2 SERPL-SCNC: 30 MMOL/L — SIGNIFICANT CHANGE UP (ref 17–32)
CREAT SERPL-MCNC: 6.7 MG/DL — CRITICAL HIGH (ref 0.7–1.5)
EGFR: 9 ML/MIN/1.73M2 — LOW
EOSINOPHIL # BLD AUTO: 0.13 K/UL — SIGNIFICANT CHANGE UP (ref 0–0.7)
EOSINOPHIL NFR BLD AUTO: 1.8 % — SIGNIFICANT CHANGE UP (ref 0–8)
ESTIMATED AVERAGE GLUCOSE: 131 MG/DL — HIGH (ref 68–114)
GLUCOSE SERPL-MCNC: 118 MG/DL — HIGH (ref 70–99)
HCT VFR BLD CALC: 37.9 % — LOW (ref 42–52)
HGB BLD-MCNC: 12.4 G/DL — LOW (ref 14–18)
IMM GRANULOCYTES NFR BLD AUTO: 0.3 % — SIGNIFICANT CHANGE UP (ref 0.1–0.3)
INR BLD: 1.02 RATIO — SIGNIFICANT CHANGE UP (ref 0.65–1.3)
LYMPHOCYTES # BLD AUTO: 0.87 K/UL — LOW (ref 1.2–3.4)
LYMPHOCYTES # BLD AUTO: 11.9 % — LOW (ref 20.5–51.1)
MCHC RBC-ENTMCNC: 31.8 PG — HIGH (ref 27–31)
MCHC RBC-ENTMCNC: 32.7 G/DL — SIGNIFICANT CHANGE UP (ref 32–37)
MCV RBC AUTO: 97.2 FL — HIGH (ref 80–94)
MONOCYTES # BLD AUTO: 0.6 K/UL — SIGNIFICANT CHANGE UP (ref 0.1–0.6)
MONOCYTES NFR BLD AUTO: 8.2 % — SIGNIFICANT CHANGE UP (ref 1.7–9.3)
NEUTROPHILS # BLD AUTO: 5.62 K/UL — SIGNIFICANT CHANGE UP (ref 1.4–6.5)
NEUTROPHILS NFR BLD AUTO: 76.8 % — HIGH (ref 42.2–75.2)
NRBC # BLD: 0 /100 WBCS — SIGNIFICANT CHANGE UP (ref 0–0)
PLATELET # BLD AUTO: 193 K/UL — SIGNIFICANT CHANGE UP (ref 130–400)
POTASSIUM SERPL-MCNC: 4.6 MMOL/L — SIGNIFICANT CHANGE UP (ref 3.5–5)
POTASSIUM SERPL-SCNC: 4.6 MMOL/L — SIGNIFICANT CHANGE UP (ref 3.5–5)
PROT SERPL-MCNC: 8.1 G/DL — HIGH (ref 6–8)
PROTHROM AB SERPL-ACNC: 11.7 SEC — SIGNIFICANT CHANGE UP (ref 9.95–12.87)
RBC # BLD: 3.9 M/UL — LOW (ref 4.7–6.1)
RBC # FLD: 15.6 % — HIGH (ref 11.5–14.5)
SODIUM SERPL-SCNC: 142 MMOL/L — SIGNIFICANT CHANGE UP (ref 135–146)
WBC # BLD: 7.31 K/UL — SIGNIFICANT CHANGE UP (ref 4.8–10.8)
WBC # FLD AUTO: 7.31 K/UL — SIGNIFICANT CHANGE UP (ref 4.8–10.8)

## 2022-08-09 PROCEDURE — 93010 ELECTROCARDIOGRAM REPORT: CPT

## 2022-08-09 PROCEDURE — 71046 X-RAY EXAM CHEST 2 VIEWS: CPT | Mod: 26

## 2022-08-09 RX ORDER — DULAGLUTIDE 4.5 MG/.5ML
1 INJECTION, SOLUTION SUBCUTANEOUS
Qty: 0 | Refills: 0 | DISCHARGE

## 2022-08-09 RX ORDER — INSULIN DETEMIR 100/ML (3)
40 INSULIN PEN (ML) SUBCUTANEOUS
Qty: 0 | Refills: 0 | DISCHARGE

## 2022-08-09 NOTE — H&P PST ADULT - NSICDXPASTMEDICALHX_GEN_ALL_CORE_FT
PAST MEDICAL HISTORY:  BPH (benign prostatic hyperplasia)     CAD (coronary artery disease)     Chronic anemia     Chronic kidney disease (CKD) Stage IV    Diabetes mellitus     HLD (hyperlipidemia)     [place] (hard of hearing)     Hypertension     Myocardial infarction 2012    OA (osteoarthritis)     PAD (peripheral artery disease) S/p bypass left leg    Pain in left knee s/p fall    S/P CABG (coronary artery bypass graft) x4    Stented coronary artery in 2008    Transient ischemic attack (TIA) 2017; 2008

## 2022-08-09 NOTE — H&P PST ADULT - ATTENDING COMMENTS
Preop: right lower extremity rest pain   plan: right lower extremity angiogram and possible endovascular revascularization

## 2022-08-09 NOTE — H&P PST ADULT - HISTORY OF PRESENT ILLNESS
61 Y/O MALE PRESENTS TO PAST WITH HX PAD. PT C/O PAIN RLE , CRAMPING, INCREASED WITH AMBULATION FOR PAST 6 MO  PT NOW FOR SCHEDULED PROCEDURE ( RLE ANGIO) . PT DENIES ANY CP SOB PALP COUGH DYSURIA FEVER URI. PT ABLE TO CHIO 1-2 FOS W/O SOB  pt denies any covid s/s, or tested positive in the past  pt advised self quarantine till day of procedure  Anesthesia Alert  NO--Difficult Airway  NO--History of neck surgery or radiation  NO--Limited ROM of neck  NO--History of Malignant hyperthermia  NO--Personal or family history of Pseudocholinesterase deficiency.  NO--Prior Anesthesia Complication  NO--Latex Allergy  NO--Loose teeth  NO--History of Rheumatoid Arthritis  NO--HAYDEN  NO--Bleeding risk  NO--Other_____     63 Y/O MALE PRESENTS TO PAST WITH HX PAD. PT C/O PAIN RLE , DIMINISHED CIRCULATION PT C/O RIGHT HEEL DFU FOR PAST 2 MO.   PT NOW FOR SCHEDULED PROCEDURE ( RLE ANGIO) . PT DENIES ANY CP SOB PALP COUGH DYSURIA FEVER URI. PT ABLE TO CHIO 1-2 FOS W/O SOB  pt denies any covid s/s, or tested positive in the past  pt advised self quarantine till day of procedure  Anesthesia Alert  NO--Difficult Airway  NO--History of neck surgery or radiation  NO--Limited ROM of neck  NO--History of Malignant hyperthermia  NO--Personal or family history of Pseudocholinesterase deficiency.  NO--Prior Anesthesia Complication  NO--Latex Allergy  NO--Loose teeth  NO--History of Rheumatoid Arthritis  NO--HAYDEN  NO--Bleeding risk  NO--Other_____     61 Y/O MALE PRESENTS TO PAST WITH HX PAD. PT C/O PAIN, CRAMPING  RLE , DIMINISHED CIRCULATION PT C/O RIGHT HEEL DFU FOR PAST 2 MO.   PT NOW FOR SCHEDULED PROCEDURE ( RLE ANGIO) . PT DENIES ANY CP SOB PALP COUGH DYSURIA FEVER URI. PT ABLE TO CHIO 1-2 FOS W/O SOB  pt denies any covid s/s, or tested positive in the past  pt advised self quarantine till day of procedure  Anesthesia Alert  NO--Difficult Airway  NO--History of neck surgery or radiation  NO--Limited ROM of neck  NO--History of Malignant hyperthermia  NO--Personal or family history of Pseudocholinesterase deficiency.  NO--Prior Anesthesia Complication  NO--Latex Allergy  NO--Loose teeth  NO--History of Rheumatoid Arthritis  NO--HAYDEN  NO--Bleeding risk  NO--Other_____

## 2022-08-09 NOTE — H&P PST ADULT - NSICDXPASTSURGICALHX_GEN_ALL_CORE_FT
PAST SURGICAL HISTORY:  H/O arterial bypass of lower limb Left Lower Extremity (2016)    History of surgery Left CEA (2017)  Left Pinkie toe Amputation (2014)  CABG x 4 (2012)  Card cath - stent (2008)      S/P CABG (coronary artery bypass graft) 2012     PAST SURGICAL HISTORY:  AV fistula 2019  LEFT AV FISTULA    H/O arterial bypass of lower limb Left Lower Extremity (2016)    History of surgery Left CEA (2017)  Left Pinkie toe Amputation (2014)  CABG x 4 (2012)  Card cath - stent (2008)      S/P CABG (coronary artery bypass graft) 2012

## 2022-08-09 NOTE — H&P PST ADULT - BLOOD AVOIDANCE/RESTRICTIONS, PROFILE
Subjective: Patient relays that he has been elevating his leg as instructed last visit and his edema is improving and pain level decreasing. Objective: Skilled home health physical therapy interventions completed today listed in care plan section. Interventions performed today to assist in return to prior level of function, address deficits as apparent upon time of initial evaluation, return to community and personal activities, and progress further towards goals as previously established in plan of care. There are not any changes to medications upon timing of this visit. Home health supplies were not ordered/delivered today. Visual inspection finds dressing intact with no significant drainage noted into dressing. Assessment:  Patient is progressing towards goals as previously established in POC with skilled home health physical therapy services at this time as made apparent by improving lower extremity edema and pain. Family/caregiver spouse involvement is present/set-up at this point in time and assists with meals, transport, and ADLs as necessary. Patient had less difficulty with step over exercises today. No noted signs/symptoms of post surgical infection during treatment completion today. Plan:  Discharge planning discussed at this visit regarding eventual discharge once patient has met goals and/or met maximum benefit from home health skilled PT services with patient understanding and agreeable at this time. Continued need for skilled home health PT at this time to address deficits, reduce risk of falls, and obtain goals as previously established per plan of care. Further gait possibly outside including stair navigation. Practice car transfer. Discharge and follow up with surgeon planned later this week.
none

## 2022-08-11 ENCOUNTER — APPOINTMENT (OUTPATIENT)
Dept: UROLOGY | Facility: CLINIC | Age: 62
End: 2022-08-11

## 2022-08-11 VITALS
SYSTOLIC BLOOD PRESSURE: 91 MMHG | WEIGHT: 189 LBS | HEIGHT: 71 IN | HEART RATE: 71 BPM | DIASTOLIC BLOOD PRESSURE: 59 MMHG | BODY MASS INDEX: 26.46 KG/M2

## 2022-08-11 PROCEDURE — 99214 OFFICE O/P EST MOD 30 MIN: CPT

## 2022-08-27 ENCOUNTER — LABORATORY RESULT (OUTPATIENT)
Age: 62
End: 2022-08-27

## 2022-08-29 NOTE — ASU PATIENT PROFILE, ADULT - NSICDXPASTSURGICALHX_GEN_ALL_CORE_FT
PAST SURGICAL HISTORY:  AV fistula 2019  LEFT AV FISTULA    H/O arterial bypass of lower limb Left Lower Extremity (2016)    History of surgery Left CEA (2017)  Left Pinkie toe Amputation (2014)  CABG x 4 (2012)  Card cath - stent (2008)      S/P CABG (coronary artery bypass graft) 2012

## 2022-08-29 NOTE — ASU PATIENT PROFILE, ADULT - FALL HARM RISK - UNIVERSAL INTERVENTIONS
Bed in lowest position, wheels locked, appropriate side rails in place/Call bell, personal items and telephone in reach/Instruct patient to call for assistance before getting out of bed or chair/Non-slip footwear when patient is out of bed/[place] to call system/Physically safe environment - no spills, clutter or unnecessary equipment/Purposeful Proactive Rounding/Room/bathroom lighting operational, light cord in reach

## 2022-08-29 NOTE — ASU PATIENT PROFILE, ADULT - NSICDXPASTMEDICALHX_GEN_ALL_CORE_FT
PAST MEDICAL HISTORY:  BPH (benign prostatic hyperplasia)     CAD (coronary artery disease)     Chronic anemia     Chronic kidney disease (CKD) Stage IV    Diabetes mellitus     HLD (hyperlipidemia)     [place] (hard of hearing)     Hypertension     Myocardial infarction 2012    OA (osteoarthritis)     PAD (peripheral artery disease) S/p bypass left leg    Pain in left knee s/p fall    S/P CABG (coronary artery bypass graft) x4    Stented coronary artery in 2008    Transient ischemic attack (TIA) 2017; 2008

## 2022-08-30 ENCOUNTER — APPOINTMENT (OUTPATIENT)
Dept: VASCULAR SURGERY | Facility: HOSPITAL | Age: 62
End: 2022-08-30

## 2022-08-30 ENCOUNTER — TRANSCRIPTION ENCOUNTER (OUTPATIENT)
Age: 62
End: 2022-08-30

## 2022-08-30 ENCOUNTER — OUTPATIENT (OUTPATIENT)
Dept: OUTPATIENT SERVICES | Facility: HOSPITAL | Age: 62
LOS: 1 days | Discharge: HOME | End: 2022-08-30

## 2022-08-30 VITALS
DIASTOLIC BLOOD PRESSURE: 71 MMHG | TEMPERATURE: 98 F | HEART RATE: 61 BPM | SYSTOLIC BLOOD PRESSURE: 134 MMHG | WEIGHT: 192.9 LBS | RESPIRATION RATE: 17 BRPM | OXYGEN SATURATION: 96 % | HEIGHT: 71 IN

## 2022-08-30 VITALS
SYSTOLIC BLOOD PRESSURE: 145 MMHG | OXYGEN SATURATION: 98 % | DIASTOLIC BLOOD PRESSURE: 75 MMHG | HEART RATE: 81 BPM | RESPIRATION RATE: 18 BRPM

## 2022-08-30 DIAGNOSIS — Z98.890 OTHER SPECIFIED POSTPROCEDURAL STATES: Chronic | ICD-10-CM

## 2022-08-30 DIAGNOSIS — I77.0 ARTERIOVENOUS FISTULA, ACQUIRED: Chronic | ICD-10-CM

## 2022-08-30 DIAGNOSIS — Z95.1 PRESENCE OF AORTOCORONARY BYPASS GRAFT: Chronic | ICD-10-CM

## 2022-08-30 DIAGNOSIS — Z95.828 PRESENCE OF OTHER VASCULAR IMPLANTS AND GRAFTS: Chronic | ICD-10-CM

## 2022-08-30 LAB — GLUCOSE BLDC GLUCOMTR-MCNC: 80 MG/DL — SIGNIFICANT CHANGE UP (ref 70–99)

## 2022-08-30 PROCEDURE — 75710 ARTERY X-RAYS ARM/LEG: CPT | Mod: 26,59

## 2022-08-30 PROCEDURE — 37225: CPT | Mod: RT

## 2022-08-30 PROCEDURE — 36247 INS CATH ABD/L-EXT ART 3RD: CPT | Mod: 59,RT

## 2022-08-30 PROCEDURE — 76937 US GUIDE VASCULAR ACCESS: CPT | Mod: 26

## 2022-08-30 PROCEDURE — 37229: CPT | Mod: RT

## 2022-08-30 RX ORDER — ASPIRIN/CALCIUM CARB/MAGNESIUM 324 MG
325 TABLET ORAL ONCE
Refills: 0 | Status: COMPLETED | OUTPATIENT
Start: 2022-08-30 | End: 2022-08-30

## 2022-08-30 RX ORDER — MORPHINE SULFATE 50 MG/1
4 CAPSULE, EXTENDED RELEASE ORAL
Refills: 0 | Status: DISCONTINUED | OUTPATIENT
Start: 2022-08-30 | End: 2022-08-30

## 2022-08-30 RX ORDER — MORPHINE SULFATE 50 MG/1
2 CAPSULE, EXTENDED RELEASE ORAL
Refills: 0 | Status: DISCONTINUED | OUTPATIENT
Start: 2022-08-30 | End: 2022-08-30

## 2022-08-30 RX ORDER — ASPIRIN/CALCIUM CARB/MAGNESIUM 324 MG
1 TABLET ORAL
Qty: 0 | Refills: 0 | DISCHARGE

## 2022-08-30 RX ORDER — SODIUM CHLORIDE 9 MG/ML
1000 INJECTION INTRAMUSCULAR; INTRAVENOUS; SUBCUTANEOUS
Refills: 0 | Status: DISCONTINUED | OUTPATIENT
Start: 2022-08-30 | End: 2022-09-13

## 2022-08-30 RX ORDER — ONDANSETRON 8 MG/1
4 TABLET, FILM COATED ORAL ONCE
Refills: 0 | Status: DISCONTINUED | OUTPATIENT
Start: 2022-08-30 | End: 2022-09-13

## 2022-08-30 RX ADMIN — Medication 325 MILLIGRAM(S): at 13:14

## 2022-08-30 NOTE — ASU DISCHARGE PLAN (ADULT/PEDIATRIC) - NS MD DC FALL RISK RISK
For information on Fall & Injury Prevention, visit: [email or web address] OR  [email or web address] OR  https://www.cdc.gov/steadi/patient.html

## 2022-08-30 NOTE — CHART NOTE - NSCHARTNOTEFT_GEN_A_CORE
PACU ANESTHESIA ADMISSION NOTE      Procedure:   Post op diagnosis:      ____  Intubated  TV:______       Rate: ______      FiO2: ______    _x___  Patent Airway    _x___  Full return of protective reflexes    _x___  Full recovery from anesthesia / back to baseline status    Vitals:  temp(F) 98  /72  spo2 98  RR 15  pulse 67    Mental Status:  __x __ Awake   _____ Alert   _____ Drowsy   _____ Sedated    Nausea/Vomiting:  ___x _ NO  ______Yes,   See Post - Op Orders          Pain Scale (0-10):  _____    Treatment: ____ None    ___x _ See Post - Op/PCA Orders    Post - Operative Fluids:   ____ Oral   ____x  See Post - Op Orders    Plan: Discharge:   __x __Home       _____Floor     _____Critical Care    _____  Other:_________________    Comments: uneventful anesthesia course no complications. Vitals stable. Pt transferred to PACU

## 2022-08-30 NOTE — BRIEF OPERATIVE NOTE - OPERATION/FINDINGS
preop; right foot wound  operation: right lower extremity angiogram, right anterior tibial artery atherectomy, angioplasty of right anterior tibial artery, peroneal artery angioplasty

## 2022-08-30 NOTE — BRIEF OPERATIVE NOTE - ASSISTANT(S)
Spoke with [employer] answering service. Information faxed to [phone] for Home oxygen. Notified [employer] of patient lives about 4 miles across [facility] line in [facility] [place] [facility] [place]. Notified company to call me back ASAP with autherization   pgy vi

## 2022-08-30 NOTE — ASU DISCHARGE PLAN (ADULT/PEDIATRIC) - DRIVING
ED Time Seen By Provider Entered On:  3/18/2018 18:39     Performed On:  3/18/2018 18:39  by Medina Lynne NP               Time Seen By Provider   Time Seen by Provider :   3/18/2018 18:39    Medina Lynne NP - 3/18/2018 18:39            Yes

## 2022-08-30 NOTE — ASU DISCHARGE PLAN (ADULT/PEDIATRIC) - CARE PROVIDER_API CALL
John Malik)  Surgery; Vascular Surgery  [address] [place] [address]  Phone: [phone]  Fax: [phone]  Follow Up Time:

## 2022-08-30 NOTE — BRIEF OPERATIVE NOTE - NSICDXBRIEFPREOP_GEN_ALL_CORE_FT
Refill request-  oxyCODONE/APAP (PERCOCET)  MG per tablet 120 tablet 0 1/6/2017       Sig: One tablet four times daily as needed for pain.     morphine SR (MS CONTIN) 30 MG Tab CR 12 hr tablet 60 tablet 0 1/6/2017       Sig - Route: Take 1 tablet by mouth 2 times daily. - Oral   Patient requested return call when scripts are ready for  at clinic.    
sign  
PRE-OP DIAGNOSIS:  Wound of right foot 30-Aug-2022 12:56:12  En Rodriguez

## 2022-08-30 NOTE — BRIEF OPERATIVE NOTE - NSICDXBRIEFPROCEDURE_GEN_ALL_CORE_FT
PROCEDURES:  Angiogram, infrapopliteal 30-Aug-2022 12:54:41  En Rodriguez  Atherectomy, artery, superficial femoral, right 30-Aug-2022 12:55:26  En Rodriguez  Atherectomy, artery, anterior tibial 30-Aug-2022 12:55:50  En Rodriguez

## 2022-09-04 DIAGNOSIS — Z84.1 FAMILY HISTORY OF DISORDERS OF KIDNEY AND URETER: ICD-10-CM

## 2022-09-04 DIAGNOSIS — I70.221 ATHEROSCLEROSIS OF NATIVE ARTERIES OF EXTREMITIES WITH REST PAIN, RIGHT LEG: ICD-10-CM

## 2022-09-04 DIAGNOSIS — N18.4 CHRONIC KIDNEY DISEASE, STAGE 4 (SEVERE): ICD-10-CM

## 2022-09-04 DIAGNOSIS — Z83.3 FAMILY HISTORY OF DIABETES MELLITUS: ICD-10-CM

## 2022-09-04 DIAGNOSIS — Z95.1 PRESENCE OF AORTOCORONARY BYPASS GRAFT: ICD-10-CM

## 2022-09-04 DIAGNOSIS — Z82.49 FAMILY HISTORY OF ISCHEMIC HEART DISEASE AND OTHER DISEASES OF THE CIRCULATORY SYSTEM: ICD-10-CM

## 2022-09-04 DIAGNOSIS — Z79.51 LONG TERM (CURRENT) USE OF INHALED STEROIDS: ICD-10-CM

## 2022-09-04 DIAGNOSIS — E11.22 TYPE 2 DIABETES MELLITUS WITH DIABETIC CHRONIC KIDNEY DISEASE: ICD-10-CM

## 2022-09-04 DIAGNOSIS — Z79.02 LONG TERM (CURRENT) USE OF ANTITHROMBOTICS/ANTIPLATELETS: ICD-10-CM

## 2022-09-04 DIAGNOSIS — M79.661 PAIN IN RIGHT LOWER LEG: ICD-10-CM

## 2022-09-04 DIAGNOSIS — Z79.84 LONG TERM (CURRENT) USE OF ORAL HYPOGLYCEMIC DRUGS: ICD-10-CM

## 2022-09-04 DIAGNOSIS — I25.2 OLD MYOCARDIAL INFARCTION: ICD-10-CM

## 2022-09-04 DIAGNOSIS — D64.9 ANEMIA, UNSPECIFIED: ICD-10-CM

## 2022-09-04 DIAGNOSIS — I73.9 PERIPHERAL VASCULAR DISEASE, UNSPECIFIED: ICD-10-CM

## 2022-09-04 DIAGNOSIS — Z95.5 PRESENCE OF CORONARY ANGIOPLASTY IMPLANT AND GRAFT: ICD-10-CM

## 2022-09-04 DIAGNOSIS — Z79.82 LONG TERM (CURRENT) USE OF ASPIRIN: ICD-10-CM

## 2022-09-04 DIAGNOSIS — Z86.73 PERSONAL HISTORY OF TRANSIENT ISCHEMIC ATTACK (TIA), AND CEREBRAL INFARCTION WITHOUT RESIDUAL DEFICITS: ICD-10-CM

## 2022-09-04 DIAGNOSIS — E78.5 HYPERLIPIDEMIA, UNSPECIFIED: ICD-10-CM

## 2022-09-04 DIAGNOSIS — E11.51 TYPE 2 DIABETES MELLITUS WITH DIABETIC PERIPHERAL ANGIOPATHY WITHOUT GANGRENE: ICD-10-CM

## 2022-09-04 DIAGNOSIS — I12.9 HYPERTENSIVE CHRONIC KIDNEY DISEASE WITH STAGE 1 THROUGH STAGE 4 CHRONIC KIDNEY DISEASE, OR UNSPECIFIED CHRONIC KIDNEY DISEASE: ICD-10-CM

## 2022-09-04 DIAGNOSIS — N40.0 BENIGN PROSTATIC HYPERPLASIA WITHOUT LOWER URINARY TRACT SYMPTOMS: ICD-10-CM

## 2022-09-05 ENCOUNTER — EMERGENCY (EMERGENCY)
Facility: HOSPITAL | Age: 62
LOS: 0 days | Discharge: HOME | End: 2022-09-06
Attending: STUDENT IN AN ORGANIZED HEALTH CARE EDUCATION/TRAINING PROGRAM | Admitting: STUDENT IN AN ORGANIZED HEALTH CARE EDUCATION/TRAINING PROGRAM

## 2022-09-05 VITALS
WEIGHT: 179.9 LBS | TEMPERATURE: 100 F | RESPIRATION RATE: 17 BRPM | HEART RATE: 102 BPM | SYSTOLIC BLOOD PRESSURE: 117 MMHG | DIASTOLIC BLOOD PRESSURE: 56 MMHG | HEIGHT: 71 IN | OXYGEN SATURATION: 98 %

## 2022-09-05 DIAGNOSIS — Z86.73 PERSONAL HISTORY OF TRANSIENT ISCHEMIC ATTACK (TIA), AND CEREBRAL INFARCTION WITHOUT RESIDUAL DEFICITS: ICD-10-CM

## 2022-09-05 DIAGNOSIS — I25.10 ATHEROSCLEROTIC HEART DISEASE OF NATIVE CORONARY ARTERY WITHOUT ANGINA PECTORIS: ICD-10-CM

## 2022-09-05 DIAGNOSIS — E11.22 TYPE 2 DIABETES MELLITUS WITH DIABETIC CHRONIC KIDNEY DISEASE: ICD-10-CM

## 2022-09-05 DIAGNOSIS — Z91.81 HISTORY OF FALLING: ICD-10-CM

## 2022-09-05 DIAGNOSIS — Z99.2 DEPENDENCE ON RENAL DIALYSIS: ICD-10-CM

## 2022-09-05 DIAGNOSIS — R11.2 NAUSEA WITH VOMITING, UNSPECIFIED: ICD-10-CM

## 2022-09-05 DIAGNOSIS — Z98.890 OTHER SPECIFIED POSTPROCEDURAL STATES: Chronic | ICD-10-CM

## 2022-09-05 DIAGNOSIS — R42 DIZZINESS AND GIDDINESS: ICD-10-CM

## 2022-09-05 DIAGNOSIS — E11.51 TYPE 2 DIABETES MELLITUS WITH DIABETIC PERIPHERAL ANGIOPATHY WITHOUT GANGRENE: ICD-10-CM

## 2022-09-05 DIAGNOSIS — I12.0 HYPERTENSIVE CHRONIC KIDNEY DISEASE WITH STAGE 5 CHRONIC KIDNEY DISEASE OR END STAGE RENAL DISEASE: ICD-10-CM

## 2022-09-05 DIAGNOSIS — Z20.822 CONTACT WITH AND (SUSPECTED) EXPOSURE TO COVID-19: ICD-10-CM

## 2022-09-05 DIAGNOSIS — Z95.1 PRESENCE OF AORTOCORONARY BYPASS GRAFT: ICD-10-CM

## 2022-09-05 DIAGNOSIS — E78.5 HYPERLIPIDEMIA, UNSPECIFIED: ICD-10-CM

## 2022-09-05 DIAGNOSIS — N18.6 END STAGE RENAL DISEASE: ICD-10-CM

## 2022-09-05 DIAGNOSIS — Z79.4 LONG TERM (CURRENT) USE OF INSULIN: ICD-10-CM

## 2022-09-05 DIAGNOSIS — I77.0 ARTERIOVENOUS FISTULA, ACQUIRED: Chronic | ICD-10-CM

## 2022-09-05 DIAGNOSIS — Z95.1 PRESENCE OF AORTOCORONARY BYPASS GRAFT: Chronic | ICD-10-CM

## 2022-09-05 DIAGNOSIS — Z95.828 PRESENCE OF OTHER VASCULAR IMPLANTS AND GRAFTS: Chronic | ICD-10-CM

## 2022-09-05 DIAGNOSIS — Z79.02 LONG TERM (CURRENT) USE OF ANTITHROMBOTICS/ANTIPLATELETS: ICD-10-CM

## 2022-09-05 DIAGNOSIS — Z79.899 OTHER LONG TERM (CURRENT) DRUG THERAPY: ICD-10-CM

## 2022-09-05 LAB
APTT BLD: 29.4 SEC — SIGNIFICANT CHANGE UP (ref 27–39.2)
BASE EXCESS BLDV CALC-SCNC: 8.1 MMOL/L — HIGH (ref -2–3)
CA-I SERPL-SCNC: 1.07 MMOL/L — LOW (ref 1.15–1.33)
GAS PNL BLDV: 132 MMOL/L — LOW (ref 136–145)
GAS PNL BLDV: SIGNIFICANT CHANGE UP
HCO3 BLDV-SCNC: 34 MMOL/L — HIGH (ref 22–29)
HCT VFR BLD CALC: 39.7 % — LOW (ref 42–52)
HCT VFR BLDA CALC: 39 % — SIGNIFICANT CHANGE UP (ref 39–51)
HGB BLD CALC-MCNC: 13.1 G/DL — SIGNIFICANT CHANGE UP (ref 12.6–17.4)
HGB BLD-MCNC: 13.2 G/DL — LOW (ref 14–18)
INR BLD: 1.12 RATIO — SIGNIFICANT CHANGE UP (ref 0.65–1.3)
LACTATE BLDV-MCNC: 1.7 MMOL/L — SIGNIFICANT CHANGE UP (ref 0.5–2)
MCHC RBC-ENTMCNC: 32.8 PG — HIGH (ref 27–31)
MCHC RBC-ENTMCNC: 33.2 G/DL — SIGNIFICANT CHANGE UP (ref 32–37)
MCV RBC AUTO: 98.5 FL — HIGH (ref 80–94)
PCO2 BLDV: 51 MMHG — SIGNIFICANT CHANGE UP (ref 42–55)
PH BLDV: 7.43 — SIGNIFICANT CHANGE UP (ref 7.32–7.43)
PLATELET # BLD AUTO: 164 K/UL — SIGNIFICANT CHANGE UP (ref 130–400)
PO2 BLDV: 19 MMHG — SIGNIFICANT CHANGE UP
POTASSIUM BLDV-SCNC: 5.1 MMOL/L — SIGNIFICANT CHANGE UP (ref 3.5–5.1)
PROTHROM AB SERPL-ACNC: 12.9 SEC — HIGH (ref 9.95–12.87)
RBC # BLD: 4.03 M/UL — LOW (ref 4.7–6.1)
RBC # FLD: 15.2 % — HIGH (ref 11.5–14.5)
SAO2 % BLDV: 25 % — SIGNIFICANT CHANGE UP
WBC # BLD: 11.01 K/UL — HIGH (ref 4.8–10.8)
WBC # FLD AUTO: 11.01 K/UL — HIGH (ref 4.8–10.8)

## 2022-09-05 PROCEDURE — 99285 EMERGENCY DEPT VISIT HI MDM: CPT

## 2022-09-05 PROCEDURE — 93010 ELECTROCARDIOGRAM REPORT: CPT

## 2022-09-05 NOTE — ED PROVIDER NOTE - OBJECTIVE STATEMENT
62 year old M with hx of cad s/p cabg, esrd on HD MWF (LUE av fistula), dm, htn, hld, dld, tia, PVD, carotid stenosis s/p rt CEA on asa/plavix, recent angiogram/atherectomy of superficial rt fem 8/30 with Dr. Malik presenting to er for eval of n/v. Pt sts sometimes after dialysis sessions has few episodes of nausea/vomiting. sts today after finishing dialysis is having n/v but feels worse than typical symptoms. Pt also sts after getting out of bed at 7pm had mech fall and fell backwards. No head trauma or loc. Sts called nephew for help and has been ambulating at baseline since. Denies any fever/chills, chest pain, sob, diarrhea, abd pain, urinary symptoms, neck back pain/difficulty walking.

## 2022-09-05 NOTE — ED PROVIDER NOTE - NS ED ATTENDING STATEMENT MOD
This was a shared visit with the [facility]. I reviewed and verified the documentation and independently performed the documented:

## 2022-09-05 NOTE — ED PROVIDER NOTE - CLINICAL SUMMARY MEDICAL DECISION MAKING FREE TEXT BOX
nausea, fall after HD, +cardiac history, recent RLE angio-gram/-plast - initial ekg/xr/labs incl Tn nml - given history concern for acs, obs for further mgmt

## 2022-09-05 NOTE — ED PROVIDER NOTE - NS ED ROS FT
Constitutional: no fever, chills, no recent weight loss, change in appetite or malaise  Eyes: no redness/discharge/pain/vision changes  ENT: no rhinorrhea/ear pain/sore throat  Cardiac: No chest pain, SOB or edema.  Respiratory: No cough or respiratory distress  GI: + n/v. No diarrhea or abdominal pain.  : No dysuria, frequency, urgency or hematuria  MS: no pain to back or extremities, no loss of ROM, no weakness  Neuro: No headache or weakness. No LOC.  Skin: No skin rash.  Endocrine: + hx of diabetes.  Except as documented in the HPI, all other systems are negative.

## 2022-09-05 NOTE — ED PROVIDER NOTE - PHYSICAL EXAMINATION
CONSTITUTIONAL: Well-appearing; well-nourished; in no apparent distress.   EYES: PERRL; EOM intact.   ENT: normal nose; no rhinorrhea; normal pharynx with no tonsillar hypertrophy.   NECK: Supple; non-tender; no cervical lymphadenopathy.   CARDIOVASCULAR: Normal S1, S2; no murmurs, rubs, or gallops.   RESPIRATORY: Normal chest excursion with respiration; breath sounds clear and equal bilaterally; no wheezes, rhonchi, or rales.  GI/: Normal bowel sounds; non-distended; non-tender; no palpable organomegaly.   MS: No evidence of trauma or deformity. Normal ROM in all four extremities; non-tender to palpation; distal pulses are normal.   SKIN: Normal for age and race; warm; dry; good turgor; no apparent lesions or exudate.   NEURO/PSYCH: A & O x 4; grossly unremarkable. mood and manner are appropriate. Grooming and personal hygiene are appropriate. No apparent thoughts of harm to self or others.

## 2022-09-05 NOTE — ED ADULT TRIAGE NOTE - GLASGOW COMA SCALE: BEST VERBAL RESPONSE, MLM
"6/15/2018      RE: Clarke Moreira  [address]  [facility] [id number]       Dear Colleague,    Thank you for the opportunity to participate in the care of your patient, Clarke Moreira, at the [facility] HEART [facility]. Please see a copy of my visit note below.    HPI:   Mr. Moreira is a 66 year old male with a past medical history including prior history of systolic heart failure, most recent LVEF normal, also hx of morbid obesity, atrial fibrillation s/p ablation 2008, THONG/alveolar hypoventilation, HTN, dyslipidemia, ?CAD - unknown details and patient denies any hx of coronary angiography, PAD, hypothyroidism, type2 DM, depression, anxiety, BPH. Presents to clinic for CORE follow-up.    He was admitted 1/5/18-1/1-/18 for cellulitis and decompensated heart failure. He was diuresed approx 20 lb at this time. His last echo ordered by PCP showed EF 55-60% and mildly reduced RVSF. He was admitted 6/4 as a planned admission for RHC and coronary angiogram for bridging with heparin beforehand given BMI too high for Lovenox and recent TIA with high [id number] - though heparin was not ordered in the hospital. He was noted to be volume overloaded on day of admission so was diuresed about 20 lb before RHC. RHC showed RA 10 and PCWP 10 with normal cardiac outputs. He was changed to torsemide 50 mg bid, lisinopril was decreased (per patient to daily), and he was not bridged back to therapeutic INR. His DC weight was 370 lb.     Since hospital DC, patient feels \"great\" - even better than day of DC. He does not monitor weight as he does not have scale. LE edema has improved. He sleeps on incline without orthopnea or PND, wears CPAP at night. He denies presyncope, syncope, palpitations. Denies chest pain with current level of exertion. BP at home 110s mostly/70s. No recent stroke-like sx, no bleeding issues on warfarin.     PAST MEDICAL HISTORY:  Past Medical " History:   Diagnosis Date     Atrial fibrillation (H)      Basal cell carcinoma      Chronic anticoagulation      Diastolic heart failure (H)      Dyslipidemia      Essential hypertension      Morbid obesity (H)      Sleep-disordered breathing      Type 2 diabetes mellitus (H)      FAMILY HISTORY:  Family History   Problem Relation Age of Onset     Depression Mother      Glaucoma Maternal Grandfather      CANCER No family hx of      No family history of skin cancer     Macular Degeneration No family hx of      SOCIAL HISTORY:  Social History     Social History     Marital status: Single     Spouse name: N/A     Number of children: N/A     Years of education: N/A     Occupational History     Not on file.     Social History Main Topics     Smoking status: Never Smoker     Smokeless tobacco: Never Used     Alcohol use No      Comment: Former alcohol abuse. Quit 70s then quit later in 80s-present     Drug use: No      Comment: history of LSD use     Sexual activity: Not on file     Other Topics Concern     Not on file     Social History Narrative    Lives in an apartment in 16th floor near hospital.  Has elevators, unable to use stairs. Not able to shop; has things delivered to him including food. Difficulty completing cleaning. Goes to PCP once yearly for annual check up and medication review.       CURRENT MEDICATIONS:    Current Outpatient Prescriptions on File Prior to Visit:  Ascorbic Acid (VITAMIN C PO)    aspirin 81 MG tablet Take 1 tablet (81 mg) by mouth daily   atorvastatin (LIPITOR) 40 MG tablet Take 1 tablet (40 mg) by mouth daily   blood glucose monitoring (ONE TOUCH DELICA) lancets Use to test blood sugars 2 times daily or as directed.   blood glucose monitoring (ONE TOUCH ULTRA MINI) meter device kit Use to test blood sugars 2 times daily or as directed.   blood glucose monitoring (ONETOUCH ULTRA) test strip Use to test blood sugars 2 times daily or as directed.   levothyroxine (SYNTHROID/LEVOTHROID) 175  MCG tablet Take 1 tablet (175 mcg) by mouth daily   lisinopril (PRINIVIL/ZESTRIL) 5 MG tablet Take 1 tablet (5 mg) by mouth 2 times daily   metFORMIN (GLUCOPHAGE) 500 MG tablet Take 1 tablet (500 mg) by mouth 2 times daily (with meals)   metoprolol (TOPROL-XL) 100 MG 24 hr tablet Take 1 tablet (100 mg) by mouth daily   multivitamin, therapeutic with minerals (MULTI-VITAMIN) TABS tablet Take 1 tablet by mouth daily   nystatin (MYCOSTATIN) cream    omega 3 1000 MG CAPS Take 1 g by mouth daily   order for DME Equipment being ordered: Bariatric Lift chairDiagnosis - morbid obesity, CHF, LymphedemaFax to Ne from [facility] at [phone].   order for DME Equipment being ordered: Other: Velcro Compression stockings. Treatment Diagnosis: bilateral lymphedema.   polyethylene glycol (MIRALAX/GLYCOLAX) Packet Take 17 g by mouth daily as needed for constipation   potassium chloride SA (K-DUR/KLOR-CON M) 20 MEQ CR tablet Take 40 meq in the morning and 20 meq in the afternoons.   senna-docusate (SENOKOT-S;PERICOLACE) 8.6-50 MG per tablet Take 1-2 tablets by mouth 2 times daily as needed for constipation   spironolactone (ALDACTONE) 25 MG tablet Take 1 tablet (25 mg) by mouth daily   tamsulosin (FLOMAX) 0.4 MG capsule Take 1 capsule (0.4 mg) by mouth daily   torsemide (DEMADEX) 100 MG tablet Take 0.5 tablets (50 mg) by mouth 2 times daily   vitamin B complex with vitamin C (VITAMIN  B COMPLEX) TABS tablet Take 1 tablet by mouth daily   VITAMIN E COMPLEX PO    warfarin (COUMADIN) 5 MG tablet Take 2.5 tablets (12.5 mg) by mouth daily   cephALEXin (KEFLEX) 500 MG capsule      No current facility-administered medications on file prior to visit.     ROS:   CONSTITUTIONAL: Denies fever, chills, fatigue, or weight fluctuations.    HEENT: Denies headache, vision changes, and changes in speech.   CV: Refer to HPI.   PULMONARY:Refer to HPI.   GI:Denies nausea, vomiting, diarrhea, and abdominal pain. Bowel movements are regular.    :Denies urinary alterations, dysuria, urinary frequency, hematuria, and abnormal drainage.   EXT:+chronic lower extremity edema.   SKIN:Denies abnormal rashes or lesions.   MUSCULOSKELETAL:Denies upper or lower extremity weakness and pain.   NEUROLOGIC:Denies lightheadedness, dizziness, seizures, or upper or lower extremity paresthesia.     EXAM:  BP 97/59 (BP Location: Left arm, Patient Position: Chair, Cuff Size: Adult Large)  Pulse 86  Ht 1.829 m (6')  Wt (!) 171.5 kg (378 lb 1.6 oz)  SpO2 96%  BMI 51.28 kg/m2     GENERAL: Appears comfortable, in no acute distress.   HEENT: Eye symmetrical, no discharge or icterus bilaterally. Mucous membranes moist and without lesions.  CV: RRR, distant S1S2, no appreciable murmur, rub, or gallop. JVP not appreciable at 90 degrees (examined in WC).   RESPIRATORY: Respirations regular, even, and unlabored. Lungs CTA throughout.   GI: Soft, obese, and non distended with normoactive bowel sounds present in all quadrants. No tenderness, rebound, guarding. No hepatomegaly.   EXTREMITIES: Trace bilat peripheral edema. 2+ bilateral pedal pulses.   NEUROLOGIC: Alert and oriented x 3. No focal deficits.   MUSCULOSKELETAL: No joint swelling or tenderness.   SKIN: No jaundice. No rashes.     Labs, reviewed with patient in clinic today:  CBC RESULTS:  Lab Results   Component Value Date    WBC 9.3 06/04/2018    RBC 4.37 (L) 06/04/2018    HGB 13.5 06/04/2018    HCT 40.8 06/04/2018    MCV 93 06/04/2018    MCH 30.9 06/04/2018    MCHC 33.1 06/04/2018    RDW 13.7 06/04/2018     06/04/2018     CMP RESULTS:  Lab Results   Component Value Date     06/15/2018    POTASSIUM 4.3 06/15/2018    CHLORIDE 102 06/15/2018    CO2 27 06/15/2018    ANIONGAP 10 06/15/2018     (H) 06/15/2018    BUN 19 06/15/2018    CR 0.98 06/15/2018    GFRESTIMATED 76 06/15/2018    GFRESTBLACK >90 06/15/2018    CHERI 9.2 06/15/2018    BILITOTAL 1.1 06/04/2018    ALBUMIN 3.3 (L) 06/04/2018    ALKPHOS 79  06/04/2018    ALT 22 06/04/2018    AST 16 06/04/2018      INR RESULTS:  Lab Results   Component Value Date    INR 1.50 (H) 06/08/2018     Lab Results   Component Value Date    MAG 2.3 06/08/2018     Lab Results   Component Value Date    NTBNPI 1621 (H) 06/04/2018     Lab Results   Component Value Date    NTBNP 1443 (H) 05/21/2018     Diagnostics:  ECG 1/6/18  Atrial fibrillation with PVCs, multifocal    TTE 5/18/18  Technically difficult study. The patient's rhythm is atrial fibrillation.  Global and regional left ventricular function is normal with an EF of 55-60%.  Global right ventricular function is mildly reduced.  No significant valvular abnormalities were noted.  Dilation of the inferior vena cava is present with normal respiratory  variation in diameter.Estimated mean right atrial pressure is 8 mmHg.  No pericardial effusion is present    RHC 6/6/18      Assessment and Plan:   Mr. Moreira is a 66 year old male with prior history of systolic heart failure, most recent LVEF normal, also hx of morbid obesity, atrial fibrillation, THONG/alveolar hypoventilation, HTN who presents to CORE clinic for follow-up after recent RHC and coronary angioram. Today he appears fairly euvolemic and subjectively he is feeling great. Will set up with [facility] to allow remote monitoring and diuretic adjustments. Increase spironolactone today, repeat BMP next week.     # Chronic borderline diastolic heart failure/HFpEF with improved EF  Multifactorial, some component of ICM but also likely diastolic dysfunction 2/2 obesity, THONG, etc. Stage C. NYHA Class III.    Fluid status: euvolemic, continue torsemide 50 mg bid with extra dose PRN for weight gain or swelling  ACEi/ARB/ARNI: lisinopril 5 mg daily at HS   BB: Toprol 100 mg daily for HTN and rate control  Aldosterone antagonist: increase spironolactone 50 mg daily (despite normal EF now, some benefit shown in TOPCAT trial)  SCD prophylaxis: n/a EF>40%  Ischemic evaluation:  negative coronary angiogram 6/2018  Remote monitoring: will set patient up w/ [facility]     # HTN  -continue lisinpril and toprol    # Atrial fibrillation ?permanant, hx of ablation per patient   [id number] 4 (?6 if recent neuro episode considered TIA)  -continue warfarin  -continue toprol   -may need EP f/u    # THONG/alveolar hypoventilation  -encouraged consistent CPAP use       Follow up in 1 month in CORE clinic with myself      25 minutes spent face-to-face with patient, >50% in counseling and/or coordination of care as described above      GEOFFREY BE    Please do not hesitate to contact me if you have any questions/concerns.     Sincerely,     REAGAN Regalado CNP     (V5) oriented

## 2022-09-05 NOTE — ED PROVIDER NOTE - ATTENDING APP SHARED VISIT CONTRIBUTION OF CARE
62M pmh ESRD on HD MWF via LUE fistula, cad/cabg/stent, dm, htn, recent RLE angiogram/-plasty 8/30 p/w fall. Rpts getting home today after completion of hemodialysis, while lying in bed began to feel lightheaded & nauseous, got up to try and vomit in bathroom states he tripped on a floor rug and fell onto R side. No HT/LOC. Currently c/o nausea only, otherwise asx. No ha, dizziness, vision changes, cpsob, v/d, flank pain, edema, rash. Makes sm vol urine. PCP [facility] / [facility] / [facility].    PE:  nad  skin warm, dry  ncat  neck supple  rrr nl s1s2 no mrg  ctab no wrr  abd soft ntnd no palpable masses no rgr  back non-tender no cvat  ext- RUE fistula, no active bleeding; remainder of ext exam nml  neuro aaox3 grossly nf exam

## 2022-09-05 NOTE — ED ADULT TRIAGE NOTE - CHIEF COMPLAINT QUOTE
pt with mechanical trip & fall onto right side   denies head injury, denies blood thinners, denies LOC

## 2022-09-06 VITALS
TEMPERATURE: 98 F | DIASTOLIC BLOOD PRESSURE: 62 MMHG | RESPIRATION RATE: 18 BRPM | HEART RATE: 85 BPM | OXYGEN SATURATION: 99 % | SYSTOLIC BLOOD PRESSURE: 131 MMHG

## 2022-09-06 LAB
ALBUMIN SERPL ELPH-MCNC: 4.7 G/DL — SIGNIFICANT CHANGE UP (ref 3.5–5.2)
ALP SERPL-CCNC: 146 U/L — HIGH (ref 30–115)
ALT FLD-CCNC: 27 U/L — SIGNIFICANT CHANGE UP (ref 0–41)
ANION GAP SERPL CALC-SCNC: 19 MMOL/L — HIGH (ref 7–14)
ANISOCYTOSIS BLD QL: SLIGHT — SIGNIFICANT CHANGE UP
AST SERPL-CCNC: 27 U/L — SIGNIFICANT CHANGE UP (ref 0–41)
BASOPHILS # BLD AUTO: 0.1 K/UL — SIGNIFICANT CHANGE UP (ref 0–0.2)
BASOPHILS NFR BLD AUTO: 0.9 % — SIGNIFICANT CHANGE UP (ref 0–1)
BILIRUB SERPL-MCNC: 0.4 MG/DL — SIGNIFICANT CHANGE UP (ref 0.2–1.2)
BUN SERPL-MCNC: 31 MG/DL — HIGH (ref 10–20)
CALCIUM SERPL-MCNC: 9.2 MG/DL — SIGNIFICANT CHANGE UP (ref 8.5–10.1)
CHLORIDE SERPL-SCNC: 92 MMOL/L — LOW (ref 98–110)
CK SERPL-CCNC: 47 U/L — SIGNIFICANT CHANGE UP (ref 0–225)
CO2 SERPL-SCNC: 27 MMOL/L — SIGNIFICANT CHANGE UP (ref 17–32)
CREAT SERPL-MCNC: 5.1 MG/DL — CRITICAL HIGH (ref 0.7–1.5)
EGFR: 12 ML/MIN/1.73M2 — LOW
EOSINOPHIL # BLD AUTO: 0 K/UL — SIGNIFICANT CHANGE UP (ref 0–0.7)
EOSINOPHIL NFR BLD AUTO: 0 % — SIGNIFICANT CHANGE UP (ref 0–8)
GLUCOSE SERPL-MCNC: 172 MG/DL — HIGH (ref 70–99)
LYMPHOCYTES # BLD AUTO: 0.19 K/UL — LOW (ref 1.2–3.4)
LYMPHOCYTES # BLD AUTO: 1.7 % — LOW (ref 20.5–51.1)
MANUAL SMEAR VERIFICATION: SIGNIFICANT CHANGE UP
MONOCYTES # BLD AUTO: 0 K/UL — LOW (ref 0.1–0.6)
MONOCYTES NFR BLD AUTO: 0 % — LOW (ref 1.7–9.3)
NEUTROPHILS # BLD AUTO: 10.72 K/UL — HIGH (ref 1.4–6.5)
NEUTROPHILS NFR BLD AUTO: 97.4 % — HIGH (ref 42.2–75.2)
NT-PROBNP SERPL-SCNC: 5512 PG/ML — HIGH (ref 0–300)
PLAT MORPH BLD: NORMAL — SIGNIFICANT CHANGE UP
POLYCHROMASIA BLD QL SMEAR: SLIGHT — SIGNIFICANT CHANGE UP
POTASSIUM SERPL-MCNC: 5.4 MMOL/L — HIGH (ref 3.5–5)
POTASSIUM SERPL-SCNC: 5.4 MMOL/L — HIGH (ref 3.5–5)
PROT SERPL-MCNC: 8.4 G/DL — HIGH (ref 6–8)
RBC BLD AUTO: NORMAL — SIGNIFICANT CHANGE UP
SARS-COV-2 RNA SPEC QL NAA+PROBE: SIGNIFICANT CHANGE UP
SODIUM SERPL-SCNC: 138 MMOL/L — SIGNIFICANT CHANGE UP (ref 135–146)
TROPONIN T SERPL-MCNC: 0.2 NG/ML — CRITICAL HIGH
TROPONIN T SERPL-MCNC: 0.21 NG/ML — CRITICAL HIGH

## 2022-09-06 PROCEDURE — 71045 X-RAY EXAM CHEST 1 VIEW: CPT | Mod: 26

## 2022-09-06 PROCEDURE — 99218: CPT

## 2022-09-06 RX ORDER — ONDANSETRON 8 MG/1
4 TABLET, FILM COATED ORAL ONCE
Refills: 0 | Status: COMPLETED | OUTPATIENT
Start: 2022-09-06 | End: 2022-09-06

## 2022-09-06 RX ORDER — REGADENOSON 0.08 MG/ML
0.4 INJECTION, SOLUTION INTRAVENOUS ONCE
Refills: 0 | Status: DISCONTINUED | OUTPATIENT
Start: 2022-09-06 | End: 2022-09-06

## 2022-09-06 RX ADMIN — ONDANSETRON 4 MILLIGRAM(S): 8 TABLET, FILM COATED ORAL at 03:31

## 2022-09-06 NOTE — ED CDU PROVIDER INITIAL DAY NOTE - PROGRESS NOTE DETAILS
D/w patient who is currently asymptomatic at this time. Ambulatory in the ED with steady gait. Pt states he tripped and fell last night, no syncope or chest pain prior to falling. Patient currently endorsing no cp. Pt spoke with his cardiologist Dr. Magdaleno this am, pt had negative stress test performed a few months ago and feels comfortable following up with him outpatient. Troponin elevation noted on initial ED workup, consistent with history of HD and at the same level as previous troponin elevations in his record. Instructed to f/u with cardiology and anjali caceres.

## 2022-09-06 NOTE — ED CDU PROVIDER DISPOSITION NOTE - PATIENT PORTAL LINK FT
You can access the FollowMyHealth Patient Portal offered by [facility] by registering at the following website: [email or web address] By joining [employer]’s FollowMyHealth portal, you will also be able to view your health information using other applications (apps) compatible with our system.

## 2022-09-06 NOTE — ED ADULT NURSE REASSESSMENT NOTE - NS ED NURSE REASSESS COMMENT FT1
pt seen and assessed. pt is a/o 4. pt reports total relief of n/v. denies cp/sob. pt on continuos cardiac monitoring. all safety and fall precautions mainianed. cardiology consult pending.

## 2022-09-06 NOTE — ED CDU PROVIDER DISPOSITION NOTE - CARE PROVIDER_API CALL
Petey Magdaleno  CARDIOVASCULAR DISEASE  [address]  [place] [address]  Phone: [phone]  Fax: [phone]  Follow Up Time:

## 2022-09-06 NOTE — ED CDU PROVIDER DISPOSITION NOTE - NSFOLLOWUPINSTRUCTIONS_ED_ALL_ED_FT
Fall Prevention in the Home, Adult  Falls can cause injuries. They can happen to people of all ages. There are many things you can do to make your home safe and to help prevent falls. Ask for help when making these changes, if needed.    What actions can I take to prevent falls?  General Instructions     Use good lighting in all rooms. Replace any light bulbs that burn out.  Turn on the lights when you go into a dark area. Use night-lights.  Keep items that you use often in easy-to-reach places. Lower the shelves around your home if necessary.  Set up your furniture so you have a clear path. Avoid moving your furniture around.  Do not have throw rugs and other things on the floor that can make you trip.  Avoid walking on wet floors.  If any of your floors are uneven, fix them.  Add color or contrast paint or tape to clearly mariela and help you see:  Any grab bars or handrails.  First and last steps of stairways.  Where the edge of each step is.  If you use a stepladder:  Make sure that it is fully opened. Do not climb a closed stepladder.  Make sure that both sides of the stepladder are locked into place.  Ask someone to hold the stepladder for you while you use it.  If there are any pets around you, be aware of where they are.  What can I do in the bathroom?     Keep the floor dry. Clean up any water that spills onto the floor as soon as it happens.  Remove soap buildup in the tub or shower regularly.  Use non-skid mats or decals on the floor of the tub or shower.  Attach bath mats securely with double-sided, non-slip rug tape.  If you need to sit down in the shower, use a plastic, non-slip stool.  Install grab bars by the toilet and in the tub and shower. Do not use towel bars as grab bars.  What can I do in the bedroom?     Make sure that you have a light by your bed that is easy to reach.  Do not use any sheets or blankets that are too big for your bed. They should not hang down onto the floor.  Have a firm chair that has side arms. You can use this for support while you get dressed.  What can I do in the kitchen?     Clean up any spills right away.  If you need to reach something above you, use a strong step stool that has a grab bar.  Keep electrical cords out of the way.  Do not use floor polish or wax that makes floors slippery. If you must use wax, use non-skid floor wax.  What can I do with my stairs?     Do not leave any items on the stairs.  Make sure that you have a light switch at the top of the stairs and the bottom of the stairs. If you do not have them, ask someone to add them for you.  Make sure that there are handrails on both sides of the stairs, and use them. Fix handrails that are broken or loose. Make sure that handrails are as long as the stairways.  Install non-slip stair treads on all stairs in your home.  Avoid having throw rugs at the top or bottom of the stairs. If you do have throw rugs, attach them to the floor with carpet tape.  Choose a carpet that does not hide the edge of the steps on the stairway.  Check any carpeting to make sure that it is firmly attached to the stairs. Fix any carpet that is loose or worn.  What can I do on the outside of my home?     Use bright outdoor lighting.  Regularly fix the edges of walkways and driveways and fix any cracks.  Remove anything that might make you trip as you walk through a door, such as a raised step or threshold.  Trim any bushes or trees on the path to your home.  Regularly check to see if handrails are loose or broken. Make sure that both sides of any steps have handrails.  Install guardrails along the edges of any raised decks and porches.  Clear walking paths of anything that might make someone trip, such as tools or rocks.  Have any leaves, snow, or ice cleared regularly.  Use sand or salt on walking paths during winter.  Clean up any spills in your garage right away. This includes grease or oil spills.  What other actions can I take?     Wear shoes that:  Have a low heel. Do not wear high heels.  Have rubber bottoms.  Are comfortable and fit you well.  Are closed at the toe. Do not wear open-toe sandals.  Use tools that help you move around (mobility aids) if they are needed. These include:  Canes.  Walkers.  Scooters.  Crutches.  Review your medicines with your doctor. Some medicines can make you feel dizzy. This can increase your chance of falling.  Ask your doctor what other things you can do to help prevent falls.    Where to find more information  Centers for Disease Control and PreventionCECILIO: https://cdc.gov  [employer] Aging: https://uq2daar.petrona.nih.gov  Contact a doctor if:  You are afraid of falling at home.  You feel weak, drowsy, or dizzy at home.  You fall at home.  Summary  There are many simple things that you can do to make your home safe and to help prevent falls.  Ways to make your home safe include removing tripping hazards and installing grab bars in the bathroom.  Ask for help when making these changes in your home.  This information is not intended to replace advice given to you by your health care provider. Make sure you discuss any questions you have with your health care provider.

## 2022-09-06 NOTE — ED CDU PROVIDER INITIAL DAY NOTE - MEDICAL DECISION MAKING DETAILS
I personally evaluated the patient. I reviewed the Resident’s or Physician Assistant’s note (as assigned above), and agree with the findings and plan except as documented in my note. Patient evaluated for n/v. I assumed care of this patient at 7 am on 9/6/22. Patient evaluated in the ED and placed in EDOU for reassessment. Pt endorses recent stress test that was negative last month. Asymptomatic and ambulatory in the ED upon my assessment. Will monitor.

## 2022-09-06 NOTE — ED CDU PROVIDER INITIAL DAY NOTE - ATTENDING APP SHARED VISIT CONTRIBUTION OF CARE
63 yo M pmh cad s/p cabg, esrd on HD MWF, DM, HTN, HLD, TIA, PVD, Carotid stenosis, recent angiogram of R fem 8/30 pw nausea and vomiting. Patient states he has N/V sometimes after dialysis and had an episode of nbnb vomiting after dialysis today. Pt also endorses a mechanical fall backwards and hit his head. Ambulatory at baseline, no complaints upon my assessment. No cp, no sob, no n/v/d, no abd pain, no ha, no neck pain/stiffness, no palpitations, no dizziness, no urinary sx, no extremity pain/swelling.     CONSTITUTIONAL: Well-developed; well-nourished; in no acute distress. Sitting up and providing appropriate history and physical examination  SKIN: skin exam is warm and dry, no acute rash.  HEAD: Normocephalic; atraumatic.  EYES: PERRL, 3 mm bilateral, no nystagmus, EOM intact; conjunctiva and sclera clear.  ENT: No nasal discharge; airway clear.  NECK: Supple; non tender. + full passive ROM in all directions. No JVD  CARD: S1, S2 normal; no murmurs, gallops, or rubs. Regular rate and rhythm. + Symmetric Strong Pulses  RESP: No wheezes, rales or rhonchi. Good air movement bilaterally  ABD: soft; non-distended; non-tender. No Rebound, No Guarding, No signs of peritonitis, No CVA tenderness. No pulsatile abdominal mass. + Strong and Symmetric Pulses  EXT: Normal ROM. No clubbing, cyanosis or edema. Dp and Pt Pulses intact. Cap refill less than 3 seconds  NEURO: CN 2-12 intact, normal finger to nose, normal romberg, stable gait, no sensory or motor deficits, Alert, oriented, grossly unremarkable. No Focal deficits. GCS 15. NIH 0  PSYCH: Cooperative, appropriate.

## 2022-09-06 NOTE — ED CDU PROVIDER DISPOSITION NOTE - CLINICAL COURSE
I personally evaluated the patient. I reviewed the Resident’s or Physician Assistant’s note (as assigned above), and agree with the findings and plan except as documented in my note. Patient evaluated for n/v. I assumed care of this patient at 7 am on 9/6/22. Patient evaluated in the ED and placed in EDOU for reassessment. Pt endorses recent stress test that was negative last month. Asymptomatic and ambulatory in the ED upon my assessment. D/w pt's cardiologist who states his stress test was unremarkable last month and cleared for dc and outpatient follow up. Pt has outpatient vascular follow up scheduled for next month as well. Patient well appearing. I have fully discussed the medical management and delivery of care with the patient. I have discussed any available labs, imaging and treatment options with the patient. Patient confirms understanding and has been given detailed return precautions. Patient instructed to return to the ED should symptoms persist or worsen. Patient has demonstrated capacity and has verbalized understanding. Patient is well appearing upon discharge.

## 2022-09-07 ENCOUNTER — EMERGENCY (EMERGENCY)
Facility: HOSPITAL | Age: 62
LOS: 0 days | Discharge: HOME | End: 2022-09-07
Attending: EMERGENCY MEDICINE | Admitting: EMERGENCY MEDICINE
Payer: MEDICARE

## 2022-09-07 VITALS
OXYGEN SATURATION: 100 % | HEART RATE: 78 BPM | TEMPERATURE: 96 F | DIASTOLIC BLOOD PRESSURE: 51 MMHG | RESPIRATION RATE: 18 BRPM | SYSTOLIC BLOOD PRESSURE: 105 MMHG

## 2022-09-07 VITALS
DIASTOLIC BLOOD PRESSURE: 50 MMHG | HEART RATE: 86 BPM | TEMPERATURE: 99 F | OXYGEN SATURATION: 100 % | SYSTOLIC BLOOD PRESSURE: 105 MMHG | RESPIRATION RATE: 18 BRPM

## 2022-09-07 DIAGNOSIS — Z95.828 PRESENCE OF OTHER VASCULAR IMPLANTS AND GRAFTS: Chronic | ICD-10-CM

## 2022-09-07 DIAGNOSIS — R03.1 NONSPECIFIC LOW BLOOD-PRESSURE READING: ICD-10-CM

## 2022-09-07 DIAGNOSIS — N40.0 BENIGN PROSTATIC HYPERPLASIA WITHOUT LOWER URINARY TRACT SYMPTOMS: ICD-10-CM

## 2022-09-07 DIAGNOSIS — H91.90 UNSPECIFIED HEARING LOSS, UNSPECIFIED EAR: ICD-10-CM

## 2022-09-07 DIAGNOSIS — R42 DIZZINESS AND GIDDINESS: ICD-10-CM

## 2022-09-07 DIAGNOSIS — I12.0 HYPERTENSIVE CHRONIC KIDNEY DISEASE WITH STAGE 5 CHRONIC KIDNEY DISEASE OR END STAGE RENAL DISEASE: ICD-10-CM

## 2022-09-07 DIAGNOSIS — Z95.1 PRESENCE OF AORTOCORONARY BYPASS GRAFT: Chronic | ICD-10-CM

## 2022-09-07 DIAGNOSIS — Z98.890 OTHER SPECIFIED POSTPROCEDURAL STATES: Chronic | ICD-10-CM

## 2022-09-07 DIAGNOSIS — Z99.2 DEPENDENCE ON RENAL DIALYSIS: ICD-10-CM

## 2022-09-07 DIAGNOSIS — E78.5 HYPERLIPIDEMIA, UNSPECIFIED: ICD-10-CM

## 2022-09-07 DIAGNOSIS — I25.2 OLD MYOCARDIAL INFARCTION: ICD-10-CM

## 2022-09-07 DIAGNOSIS — I77.0 ARTERIOVENOUS FISTULA, ACQUIRED: Chronic | ICD-10-CM

## 2022-09-07 DIAGNOSIS — I25.10 ATHEROSCLEROTIC HEART DISEASE OF NATIVE CORONARY ARTERY WITHOUT ANGINA PECTORIS: ICD-10-CM

## 2022-09-07 DIAGNOSIS — Z87.891 PERSONAL HISTORY OF NICOTINE DEPENDENCE: ICD-10-CM

## 2022-09-07 DIAGNOSIS — Z95.5 PRESENCE OF CORONARY ANGIOPLASTY IMPLANT AND GRAFT: ICD-10-CM

## 2022-09-07 DIAGNOSIS — Z86.73 PERSONAL HISTORY OF TRANSIENT ISCHEMIC ATTACK (TIA), AND CEREBRAL INFARCTION WITHOUT RESIDUAL DEFICITS: ICD-10-CM

## 2022-09-07 DIAGNOSIS — Z79.4 LONG TERM (CURRENT) USE OF INSULIN: ICD-10-CM

## 2022-09-07 DIAGNOSIS — E11.51 TYPE 2 DIABETES MELLITUS WITH DIABETIC PERIPHERAL ANGIOPATHY WITHOUT GANGRENE: ICD-10-CM

## 2022-09-07 DIAGNOSIS — E11.22 TYPE 2 DIABETES MELLITUS WITH DIABETIC CHRONIC KIDNEY DISEASE: ICD-10-CM

## 2022-09-07 DIAGNOSIS — Z79.02 LONG TERM (CURRENT) USE OF ANTITHROMBOTICS/ANTIPLATELETS: ICD-10-CM

## 2022-09-07 DIAGNOSIS — N18.6 END STAGE RENAL DISEASE: ICD-10-CM

## 2022-09-07 LAB
ALBUMIN SERPL ELPH-MCNC: 4 G/DL — SIGNIFICANT CHANGE UP (ref 3.5–5.2)
ALP SERPL-CCNC: 135 U/L — HIGH (ref 30–115)
ALT FLD-CCNC: 35 U/L — SIGNIFICANT CHANGE UP (ref 0–41)
ANION GAP SERPL CALC-SCNC: 11 MMOL/L — SIGNIFICANT CHANGE UP (ref 7–14)
AST SERPL-CCNC: 56 U/L — HIGH (ref 0–41)
BASOPHILS # BLD AUTO: 0.03 K/UL — SIGNIFICANT CHANGE UP (ref 0–0.2)
BASOPHILS NFR BLD AUTO: 0.4 % — SIGNIFICANT CHANGE UP (ref 0–1)
BILIRUB SERPL-MCNC: 0.4 MG/DL — SIGNIFICANT CHANGE UP (ref 0.2–1.2)
BUN SERPL-MCNC: 21 MG/DL — HIGH (ref 10–20)
CALCIUM SERPL-MCNC: 9 MG/DL — SIGNIFICANT CHANGE UP (ref 8.5–10.1)
CHLORIDE SERPL-SCNC: 98 MMOL/L — SIGNIFICANT CHANGE UP (ref 98–110)
CO2 SERPL-SCNC: 34 MMOL/L — HIGH (ref 17–32)
CREAT SERPL-MCNC: 3.4 MG/DL — HIGH (ref 0.7–1.5)
EGFR: 19 ML/MIN/1.73M2 — LOW
EOSINOPHIL # BLD AUTO: 0.01 K/UL — SIGNIFICANT CHANGE UP (ref 0–0.7)
EOSINOPHIL NFR BLD AUTO: 0.1 % — SIGNIFICANT CHANGE UP (ref 0–8)
GLUCOSE SERPL-MCNC: 121 MG/DL — HIGH (ref 70–99)
HCT VFR BLD CALC: 37.6 % — LOW (ref 42–52)
HGB BLD-MCNC: 12.6 G/DL — LOW (ref 14–18)
IMM GRANULOCYTES NFR BLD AUTO: 0.3 % — SIGNIFICANT CHANGE UP (ref 0.1–0.3)
LYMPHOCYTES # BLD AUTO: 0.1 K/UL — LOW (ref 1.2–3.4)
LYMPHOCYTES # BLD AUTO: 1.3 % — LOW (ref 20.5–51.1)
MCHC RBC-ENTMCNC: 32.6 PG — HIGH (ref 27–31)
MCHC RBC-ENTMCNC: 33.5 G/DL — SIGNIFICANT CHANGE UP (ref 32–37)
MCV RBC AUTO: 97.2 FL — HIGH (ref 80–94)
MONOCYTES # BLD AUTO: 0.24 K/UL — SIGNIFICANT CHANGE UP (ref 0.1–0.6)
MONOCYTES NFR BLD AUTO: 3.2 % — SIGNIFICANT CHANGE UP (ref 1.7–9.3)
NEUTROPHILS # BLD AUTO: 7.15 K/UL — HIGH (ref 1.4–6.5)
NEUTROPHILS NFR BLD AUTO: 94.7 % — HIGH (ref 42.2–75.2)
NRBC # BLD: 0 /100 WBCS — SIGNIFICANT CHANGE UP (ref 0–0)
PLATELET # BLD AUTO: 134 K/UL — SIGNIFICANT CHANGE UP (ref 130–400)
POTASSIUM SERPL-MCNC: 4.6 MMOL/L — SIGNIFICANT CHANGE UP (ref 3.5–5)
POTASSIUM SERPL-SCNC: 4.6 MMOL/L — SIGNIFICANT CHANGE UP (ref 3.5–5)
PROT SERPL-MCNC: 7.2 G/DL — SIGNIFICANT CHANGE UP (ref 6–8)
RBC # BLD: 3.87 M/UL — LOW (ref 4.7–6.1)
RBC # FLD: 15.2 % — HIGH (ref 11.5–14.5)
SODIUM SERPL-SCNC: 143 MMOL/L — SIGNIFICANT CHANGE UP (ref 135–146)
TROPONIN T SERPL-MCNC: 0.22 NG/ML — CRITICAL HIGH
TROPONIN T SERPL-MCNC: 0.25 NG/ML — CRITICAL HIGH
WBC # BLD: 7.55 K/UL — SIGNIFICANT CHANGE UP (ref 4.8–10.8)
WBC # FLD AUTO: 7.55 K/UL — SIGNIFICANT CHANGE UP (ref 4.8–10.8)

## 2022-09-07 PROCEDURE — 93010 ELECTROCARDIOGRAM REPORT: CPT

## 2022-09-07 PROCEDURE — 99283 EMERGENCY DEPT VISIT LOW MDM: CPT

## 2022-09-07 PROCEDURE — 70450 CT HEAD/BRAIN W/O DYE: CPT | Mod: 26,MA

## 2022-09-07 PROCEDURE — 99285 EMERGENCY DEPT VISIT HI MDM: CPT

## 2022-09-07 PROCEDURE — 93010 ELECTROCARDIOGRAM REPORT: CPT | Mod: 77

## 2022-09-07 RX ORDER — PANTOPRAZOLE SODIUM 20 MG/1
40 TABLET, DELAYED RELEASE ORAL ONCE
Refills: 0 | Status: COMPLETED | OUTPATIENT
Start: 2022-09-07 | End: 2022-09-07

## 2022-09-07 NOTE — ED PROVIDER NOTE - PATIENT PORTAL LINK FT
You can access the FollowMyHealth Patient Portal offered by [facility] by registering at the following website: [email or web address]. By joining [employer]’s FollowMyHealth portal, you will also be able to view your health information using other applications (apps) compatible with our system.

## 2022-09-07 NOTE — ED PROVIDER NOTE - NSFOLLOWUPINSTRUCTIONS_ED_ALL_ED_FT
Follow up with your primary care doctor and cardiologist within 1 week. Show them your results. Please return to the emergency department if you have new or changed chest pain, shortness of breath, acutely decreased ability to exercise, palpitations, back pain, abdominal pain,  nausea, vomiting, neck pain, dizziness, weakness, numbness, fevers >100.4 or other symptoms that you find concerning.

## 2022-09-07 NOTE — ED PROVIDER NOTE - CLINICAL SUMMARY MEDICAL DECISION MAKING FREE TEXT BOX
jfk: received sign out from Dr. Santos. pt with dizziness and low BP at HD. now improved and feels better. BP improved.  recent fall and obs stay. pt with trop elevated but downtrending, stable rpt ekg and no cp/sob or dizziness now. described the cp as heart burn. Dr Santos d/w pts cardiologist and stable for d/c if trop improved. clincially feel pt is stable at this time.  pt comfortable with this plan for d/c.

## 2022-09-07 NOTE — ED PROVIDER NOTE - PROGRESS NOTE DETAILS
spoke to dr. infante, says had stress test june 2022 outpt and no new ischemic changes.  he is comfortable with pt dc and f/u with him outpt

## 2022-09-07 NOTE — ED ADULT TRIAGE NOTE - MODE OF ARRIVAL
May 2, 2018      Jose Gomez  [id number] [facility] [id number]        Dear ,    We are writing to inform you of your test results.    Your test results fall within the expected range(s) or remain unchanged from previous results.  Please continue with current treatment plan.    Resulted Orders   Basic metabolic panel   Result Value Ref Range    Sodium 141 133 - 144 mmol/L    Potassium 3.9 3.4 - 5.3 mmol/L    Chloride 108 94 - 109 mmol/L    Carbon Dioxide 25 20 - 32 mmol/L    Anion Gap 8 3 - 14 mmol/L    Glucose 99 70 - 99 mg/dL    Urea Nitrogen 30 7 - 30 mg/dL    Creatinine 1.58 (H) 0.66 - 1.25 mg/dL    GFR Estimate 41 (L) >60 mL/min/1.7m2      Comment:      Non  GFR Calc    GFR Estimate If Black 50 (L) >60 mL/min/1.7m2      Comment:       GFR Calc    Calcium 8.5 8.5 - 10.1 mg/dL       If you have any questions or concerns, please call the clinic at the number listed above.       Sincerely,        Gabriel Carroll MD                 [facility]

## 2022-09-07 NOTE — ED PROVIDER NOTE - NSICDXPASTMEDICALHX_GEN_ALL_CORE_FT
PAST MEDICAL HISTORY:  BPH (benign prostatic hyperplasia)     CAD (coronary artery disease)     Chronic anemia     Chronic kidney disease (CKD) Stage IV    Diabetes mellitus     HLD (hyperlipidemia)     [place] (hard of hearing)     Hypertension     Myocardial infarction 2012    OA (osteoarthritis)     PAD (peripheral artery disease) S/p bypass left leg    Pain in left knee s/p fall    S/P CABG (coronary artery bypass graft) x4    Stented coronary artery in 2008    Transient ischemic attack (TIA) 2017; 2008

## 2022-09-07 NOTE — ED ADULT NURSE NOTE - NSIMPLEMENTINTERV_GEN_ALL_ED
Implemented All Fall with Harm Risk Interventions:  [place] to call system. Call bell, personal items and telephone within reach. Instruct patient to call for assistance. Room bathroom lighting operational. Non-slip footwear when patient is off stretcher. Physically safe environment: no spills, clutter or unnecessary equipment. Stretcher in lowest position, wheels locked, appropriate side rails in place. Provide visual cue, wrist band, yellow gown, etc. Monitor gait and stability. Monitor for mental status changes and reorient to person, place, and time. Review medications for side effects contributing to fall risk. Reinforce activity limits and safety measures with patient and family. Provide visual clues: red socks.

## 2022-09-07 NOTE — ED ADULT NURSE NOTE - HISTORY OF COVID-19 VACCINATION
Current Issues/Problems reviewed on call: patient states he is doing well.  He did have to reschedule his appointment with [facility] d/t scheduling conflict with his son's appointment.  He denies fever/chills, SOB, or swelling.  His appetite is good.  He is taking his medications as instructed.  He voices no concerns at this time.    Details for Interventions/Education completed on call: medication adherence.  Action plan for SOB or change in condition.  Diet and nutrition.  Activity and rest.    Screening completed for  COVID -19 using the [facility] Symptom . Screening is Negative for symptoms    Plan for Next Call: 1 week    Care Plan reviewed with Patient  and he agrees with the plan of care and the call follow up plan.    
Yes

## 2022-09-07 NOTE — ED PROVIDER NOTE - NS ED ROS FT
Review of Systems:  	•	CONSTITUTIONAL - no fever, no diaphoresis, no weight change  	•	SKIN - no rash  	•	HEMATOLOGIC - no bleeding, no bruising  	•	EYES - no eye pain, no blurred vision  	•	ENT - no change in hearing, no pain  	•	RESPIRATORY - no shortness of breath, no cough  	•	CARDIAC - no chest pain, no palpitations  	•	GI - no abd pain, no nausea, no vomiting, no diarrhea, no constipation, no bleeding  	•	 - no dysuria, no hematuria, no flank pain, no urinary retention  	•	MUSCULOSKELETAL - no joint paint, no swelling, no redness  	•	NEUROLOGIC - no focal weakness or numbness, no headache, no paresthesias, + dizziness, no facial droop, no slurred speech, no vision changes  All other systems negative, unless specified in HPI

## 2022-09-07 NOTE — ED PROVIDER NOTE - NS ED MD EKG INTERPRETATION 1
EKG: NSR, R axis, inc rbbb, qtc 482 (was 492), lateral TWI, no other ST/T changes, overall simialr to prior earlier today.

## 2022-09-07 NOTE — ED PROVIDER NOTE - OBJECTIVE STATEMENT
63 yo m with pmh of cad s/p cabg, esrd (hd mwf), dm, htn, hld, tia, presents with c/o dizziness. pt says was at dialysis and found to have low bp.  pt felt lightheaded and feels like he has "heartburn".  pt was dc from obs yesterday.  as per notes, ED staff spoke to dr. infante who confirmed that he had a stress test a few months ago.  pt's trop was slightly positive but stayed consistent and likely 2/2 renal disease.  pt denies headache.  had been having n/v, but improved now.

## 2022-09-14 ENCOUNTER — INPATIENT (INPATIENT)
Facility: HOSPITAL | Age: 62
LOS: 5 days | Discharge: HOME | End: 2022-09-20
Attending: INTERNAL MEDICINE | Admitting: INTERNAL MEDICINE
Payer: MEDICARE

## 2022-09-14 VITALS
DIASTOLIC BLOOD PRESSURE: 60 MMHG | SYSTOLIC BLOOD PRESSURE: 101 MMHG | HEART RATE: 80 BPM | TEMPERATURE: 98 F | RESPIRATION RATE: 18 BRPM | OXYGEN SATURATION: 99 % | HEIGHT: 71 IN

## 2022-09-14 DIAGNOSIS — Z98.890 OTHER SPECIFIED POSTPROCEDURAL STATES: Chronic | ICD-10-CM

## 2022-09-14 DIAGNOSIS — Z95.1 PRESENCE OF AORTOCORONARY BYPASS GRAFT: Chronic | ICD-10-CM

## 2022-09-14 DIAGNOSIS — I77.0 ARTERIOVENOUS FISTULA, ACQUIRED: Chronic | ICD-10-CM

## 2022-09-14 DIAGNOSIS — Z95.828 PRESENCE OF OTHER VASCULAR IMPLANTS AND GRAFTS: Chronic | ICD-10-CM

## 2022-09-14 LAB
ALBUMIN SERPL ELPH-MCNC: 3.4 G/DL — LOW (ref 3.5–5.2)
ALP SERPL-CCNC: 252 U/L — HIGH (ref 30–115)
ALT FLD-CCNC: 59 U/L — HIGH (ref 0–41)
ANION GAP SERPL CALC-SCNC: 13 MMOL/L — SIGNIFICANT CHANGE UP (ref 7–14)
APTT BLD: 29.6 SEC — SIGNIFICANT CHANGE UP (ref 27–39.2)
AST SERPL-CCNC: 75 U/L — HIGH (ref 0–41)
BASOPHILS # BLD AUTO: 0.02 K/UL — SIGNIFICANT CHANGE UP (ref 0–0.2)
BASOPHILS NFR BLD AUTO: 0.2 % — SIGNIFICANT CHANGE UP (ref 0–1)
BILIRUB SERPL-MCNC: 0.2 MG/DL — SIGNIFICANT CHANGE UP (ref 0.2–1.2)
BLD GP AB SCN SERPL QL: SIGNIFICANT CHANGE UP
BUN SERPL-MCNC: 31 MG/DL — HIGH (ref 10–20)
CALCIUM SERPL-MCNC: 8.7 MG/DL — SIGNIFICANT CHANGE UP (ref 8.4–10.4)
CHLORIDE SERPL-SCNC: 96 MMOL/L — LOW (ref 98–110)
CO2 SERPL-SCNC: 29 MMOL/L — SIGNIFICANT CHANGE UP (ref 17–32)
CREAT SERPL-MCNC: 4.3 MG/DL — CRITICAL HIGH (ref 0.7–1.5)
CRP SERPL-MCNC: 166.5 MG/L — HIGH
EGFR: 15 ML/MIN/1.73M2 — LOW
EOSINOPHIL # BLD AUTO: 0.03 K/UL — SIGNIFICANT CHANGE UP (ref 0–0.7)
EOSINOPHIL NFR BLD AUTO: 0.2 % — SIGNIFICANT CHANGE UP (ref 0–8)
ERYTHROCYTE [SEDIMENTATION RATE] IN BLOOD: 90 MM/HR — HIGH (ref 0–10)
GLUCOSE BLDC GLUCOMTR-MCNC: 156 MG/DL — HIGH (ref 70–99)
GLUCOSE BLDC GLUCOMTR-MCNC: 156 MG/DL — HIGH (ref 70–99)
GLUCOSE SERPL-MCNC: 193 MG/DL — HIGH (ref 70–99)
HCT VFR BLD CALC: 33.2 % — LOW (ref 42–52)
HGB BLD-MCNC: 11.2 G/DL — LOW (ref 14–18)
IMM GRANULOCYTES NFR BLD AUTO: 0.6 % — HIGH (ref 0.1–0.3)
INR BLD: 1.09 RATIO — SIGNIFICANT CHANGE UP (ref 0.65–1.3)
LYMPHOCYTES # BLD AUTO: 0.65 K/UL — LOW (ref 1.2–3.4)
LYMPHOCYTES # BLD AUTO: 4.9 % — LOW (ref 20.5–51.1)
MCHC RBC-ENTMCNC: 31.7 PG — HIGH (ref 27–31)
MCHC RBC-ENTMCNC: 33.7 G/DL — SIGNIFICANT CHANGE UP (ref 32–37)
MCV RBC AUTO: 94.1 FL — HIGH (ref 80–94)
MONOCYTES # BLD AUTO: 0.68 K/UL — HIGH (ref 0.1–0.6)
MONOCYTES NFR BLD AUTO: 5.1 % — SIGNIFICANT CHANGE UP (ref 1.7–9.3)
NEUTROPHILS # BLD AUTO: 11.77 K/UL — HIGH (ref 1.4–6.5)
NEUTROPHILS NFR BLD AUTO: 89 % — HIGH (ref 42.2–75.2)
NRBC # BLD: 0 /100 WBCS — SIGNIFICANT CHANGE UP (ref 0–0)
PLATELET # BLD AUTO: 178 K/UL — SIGNIFICANT CHANGE UP (ref 130–400)
POTASSIUM SERPL-MCNC: 4.9 MMOL/L — SIGNIFICANT CHANGE UP (ref 3.5–5)
POTASSIUM SERPL-SCNC: 4.9 MMOL/L — SIGNIFICANT CHANGE UP (ref 3.5–5)
PROT SERPL-MCNC: 7.1 G/DL — SIGNIFICANT CHANGE UP (ref 6–8)
PROTHROM AB SERPL-ACNC: 12.5 SEC — SIGNIFICANT CHANGE UP (ref 9.95–12.87)
RBC # BLD: 3.53 M/UL — LOW (ref 4.7–6.1)
RBC # FLD: 14.8 % — HIGH (ref 11.5–14.5)
SARS-COV-2 RNA SPEC QL NAA+PROBE: SIGNIFICANT CHANGE UP
SODIUM SERPL-SCNC: 138 MMOL/L — SIGNIFICANT CHANGE UP (ref 135–146)
WBC # BLD: 13.23 K/UL — HIGH (ref 4.8–10.8)
WBC # FLD AUTO: 13.23 K/UL — HIGH (ref 4.8–10.8)

## 2022-09-14 PROCEDURE — 99285 EMERGENCY DEPT VISIT HI MDM: CPT

## 2022-09-14 PROCEDURE — 93010 ELECTROCARDIOGRAM REPORT: CPT

## 2022-09-14 PROCEDURE — 99223 1ST HOSP IP/OBS HIGH 75: CPT

## 2022-09-14 PROCEDURE — 71045 X-RAY EXAM CHEST 1 VIEW: CPT | Mod: 26

## 2022-09-14 PROCEDURE — 99283 EMERGENCY DEPT VISIT LOW MDM: CPT

## 2022-09-14 PROCEDURE — 73630 X-RAY EXAM OF FOOT: CPT | Mod: 26,LT

## 2022-09-14 RX ORDER — DULAGLUTIDE 4.5 MG/.5ML
0.75 INJECTION, SOLUTION SUBCUTANEOUS
Qty: 0 | Refills: 0 | DISCHARGE

## 2022-09-14 RX ORDER — METRONIDAZOLE 500 MG
500 TABLET ORAL ONCE
Refills: 0 | Status: COMPLETED | OUTPATIENT
Start: 2022-09-14 | End: 2022-09-14

## 2022-09-14 RX ORDER — CEFEPIME 1 G/1
500 INJECTION, POWDER, FOR SOLUTION INTRAMUSCULAR; INTRAVENOUS EVERY 24 HOURS
Refills: 0 | Status: DISCONTINUED | OUTPATIENT
Start: 2022-09-14 | End: 2022-09-16

## 2022-09-14 RX ORDER — FLUTICASONE PROPIONATE 220 MCG
1 AEROSOL WITH ADAPTER (GRAM) INHALATION
Qty: 0 | Refills: 0 | DISCHARGE

## 2022-09-14 RX ORDER — INSULIN GLARGINE 100 [IU]/ML
30 INJECTION, SOLUTION SUBCUTANEOUS AT BEDTIME
Refills: 0 | Status: DISCONTINUED | OUTPATIENT
Start: 2022-09-14 | End: 2022-09-15

## 2022-09-14 RX ORDER — SODIUM CHLORIDE 9 MG/ML
1000 INJECTION, SOLUTION INTRAVENOUS
Refills: 0 | Status: DISCONTINUED | OUTPATIENT
Start: 2022-09-14 | End: 2022-09-16

## 2022-09-14 RX ORDER — DULAGLUTIDE 4.5 MG/.5ML
0 INJECTION, SOLUTION SUBCUTANEOUS
Qty: 0 | Refills: 0 | DISCHARGE

## 2022-09-14 RX ORDER — INSULIN LISPRO 100/ML
VIAL (ML) SUBCUTANEOUS
Refills: 0 | Status: DISCONTINUED | OUTPATIENT
Start: 2022-09-14 | End: 2022-09-16

## 2022-09-14 RX ORDER — CEFTRIAXONE 500 MG/1
2000 INJECTION, POWDER, FOR SOLUTION INTRAMUSCULAR; INTRAVENOUS ONCE
Refills: 0 | Status: COMPLETED | OUTPATIENT
Start: 2022-09-14 | End: 2022-09-14

## 2022-09-14 RX ORDER — INFLUENZA VIRUS VACCINE 15; 15; 15; 15 UG/.5ML; UG/.5ML; UG/.5ML; UG/.5ML
0.5 SUSPENSION INTRAMUSCULAR ONCE
Refills: 0 | Status: DISCONTINUED | OUTPATIENT
Start: 2022-09-14 | End: 2022-09-20

## 2022-09-14 RX ORDER — DEXTROSE 50 % IN WATER 50 %
12.5 SYRINGE (ML) INTRAVENOUS ONCE
Refills: 0 | Status: DISCONTINUED | OUTPATIENT
Start: 2022-09-14 | End: 2022-09-16

## 2022-09-14 RX ORDER — SEVELAMER CARBONATE 2400 MG/1
1600 POWDER, FOR SUSPENSION ORAL
Refills: 0 | Status: DISCONTINUED | OUTPATIENT
Start: 2022-09-14 | End: 2022-09-16

## 2022-09-14 RX ORDER — DEXTROSE 50 % IN WATER 50 %
25 SYRINGE (ML) INTRAVENOUS ONCE
Refills: 0 | Status: DISCONTINUED | OUTPATIENT
Start: 2022-09-14 | End: 2022-09-16

## 2022-09-14 RX ORDER — VANCOMYCIN HCL 1 G
750 VIAL (EA) INTRAVENOUS ONCE
Refills: 0 | Status: COMPLETED | OUTPATIENT
Start: 2022-09-14 | End: 2022-09-14

## 2022-09-14 RX ORDER — HEPARIN SODIUM 5000 [USP'U]/ML
5000 INJECTION INTRAVENOUS; SUBCUTANEOUS EVERY 8 HOURS
Refills: 0 | Status: DISCONTINUED | OUTPATIENT
Start: 2022-09-14 | End: 2022-09-16

## 2022-09-14 RX ORDER — DEXTROSE 50 % IN WATER 50 %
15 SYRINGE (ML) INTRAVENOUS ONCE
Refills: 0 | Status: DISCONTINUED | OUTPATIENT
Start: 2022-09-14 | End: 2022-09-16

## 2022-09-14 RX ORDER — ATORVASTATIN CALCIUM 80 MG/1
40 TABLET, FILM COATED ORAL AT BEDTIME
Refills: 0 | Status: DISCONTINUED | OUTPATIENT
Start: 2022-09-14 | End: 2022-09-16

## 2022-09-14 RX ORDER — GABAPENTIN 400 MG/1
300 CAPSULE ORAL ONCE
Refills: 0 | Status: COMPLETED | OUTPATIENT
Start: 2022-09-14 | End: 2022-09-14

## 2022-09-14 RX ORDER — GLUCAGON INJECTION, SOLUTION 0.5 MG/.1ML
1 INJECTION, SOLUTION SUBCUTANEOUS ONCE
Refills: 0 | Status: DISCONTINUED | OUTPATIENT
Start: 2022-09-14 | End: 2022-09-16

## 2022-09-14 RX ORDER — INSULIN DETEMIR 100/ML (3)
36 INSULIN PEN (ML) SUBCUTANEOUS
Qty: 0 | Refills: 0 | DISCHARGE

## 2022-09-14 RX ORDER — METOPROLOL TARTRATE 50 MG
50 TABLET ORAL
Refills: 0 | Status: DISCONTINUED | OUTPATIENT
Start: 2022-09-14 | End: 2022-09-16

## 2022-09-14 RX ORDER — OMEGA-3 ACID ETHYL ESTERS 1 G
1 CAPSULE ORAL
Qty: 0 | Refills: 0 | DISCHARGE

## 2022-09-14 RX ORDER — METRONIDAZOLE 500 MG
500 TABLET ORAL EVERY 8 HOURS
Refills: 0 | Status: DISCONTINUED | OUTPATIENT
Start: 2022-09-14 | End: 2022-09-16

## 2022-09-14 RX ORDER — PANTOPRAZOLE SODIUM 20 MG/1
40 TABLET, DELAYED RELEASE ORAL
Refills: 0 | Status: DISCONTINUED | OUTPATIENT
Start: 2022-09-14 | End: 2022-09-16

## 2022-09-14 RX ORDER — NIFEDIPINE 30 MG
1 TABLET, EXTENDED RELEASE 24 HR ORAL
Qty: 0 | Refills: 0 | DISCHARGE

## 2022-09-14 RX ORDER — ASPIRIN/CALCIUM CARB/MAGNESIUM 324 MG
81 TABLET ORAL DAILY
Refills: 0 | Status: DISCONTINUED | OUTPATIENT
Start: 2022-09-14 | End: 2022-09-16

## 2022-09-14 RX ORDER — CLOPIDOGREL BISULFATE 75 MG/1
75 TABLET, FILM COATED ORAL DAILY
Refills: 0 | Status: DISCONTINUED | OUTPATIENT
Start: 2022-09-14 | End: 2022-09-15

## 2022-09-14 RX ORDER — FUROSEMIDE 40 MG
80 TABLET ORAL DAILY
Refills: 0 | Status: DISCONTINUED | OUTPATIENT
Start: 2022-09-14 | End: 2022-09-16

## 2022-09-14 RX ORDER — INSULIN GLARGINE 100 [IU]/ML
40 INJECTION, SOLUTION SUBCUTANEOUS AT BEDTIME
Refills: 0 | Status: DISCONTINUED | OUTPATIENT
Start: 2022-09-14 | End: 2022-09-14

## 2022-09-14 RX ADMIN — CEFTRIAXONE 100 MILLIGRAM(S): 500 INJECTION, POWDER, FOR SOLUTION INTRAMUSCULAR; INTRAVENOUS at 15:30

## 2022-09-14 RX ADMIN — Medication 50 MILLIGRAM(S): at 18:33

## 2022-09-14 RX ADMIN — Medication 100 MILLIGRAM(S): at 21:57

## 2022-09-14 RX ADMIN — CEFTRIAXONE 2000 MILLIGRAM(S): 500 INJECTION, POWDER, FOR SOLUTION INTRAMUSCULAR; INTRAVENOUS at 16:00

## 2022-09-14 RX ADMIN — INSULIN GLARGINE 40 UNIT(S): 100 INJECTION, SOLUTION SUBCUTANEOUS at 21:49

## 2022-09-14 RX ADMIN — HEPARIN SODIUM 5000 UNIT(S): 5000 INJECTION INTRAVENOUS; SUBCUTANEOUS at 21:57

## 2022-09-14 RX ADMIN — Medication 250 MILLIGRAM(S): at 23:28

## 2022-09-14 RX ADMIN — CEFEPIME 100 MILLIGRAM(S): 1 INJECTION, POWDER, FOR SOLUTION INTRAMUSCULAR; INTRAVENOUS at 18:30

## 2022-09-14 RX ADMIN — Medication 100 MILLIGRAM(S): at 16:14

## 2022-09-14 RX ADMIN — ATORVASTATIN CALCIUM 40 MILLIGRAM(S): 80 TABLET, FILM COATED ORAL at 21:57

## 2022-09-14 NOTE — H&P ADULT - ATTENDING COMMENTS
Patient seen and examined at bedside independently of the residents. I read the resident's note and agree with the plan with the additions and corrections as noted below.    REVIEW OF SYSTEMS:  CONSTITUTIONAL: No weakness, fevers or chills  EYES/ENT: No visual changes;  No vertigo or throat pain   NECK: No pain or stiffness  RESPIRATORY: No cough, wheezing, hemoptysis; No shortness of breath  CARDIOVASCULAR: No chest pain or palpitations  GASTROINTESTINAL: No abdominal or epigastric pain. No nausea, vomiting, or hematemesis; No diarrhea or constipation. No melena or hematochezia.  GENITOURINARY: No dysuria, frequency or hematuria  NEUROLOGICAL: No numbness or weakness  SKIN: No itching, rashes.     PMH: ESRD on HD (MWF), CAD s/p stent & CABG, PAD, HTN, HLD, DM II with neuropathy and TIA    FHx: Reviewed. Not relevant.     Physical Exam:  GEN: No acute distress. A & O x 3.   HEENT: PEERLA. No sclera icterus. EOMI.   LUNGS: Clear to auscultation bilaterally. No wheeze/rales/crackles.   HEART: Normal. S1/S2 present. RRR. No murmur/gallops.   ABD: Soft, non-tender, non-distended. Bowel sounds present.   EXT: No pitting edema.   Integumentary: No rash, No petechia.   NEURO: CN III-XII intact. Strength: 5/5 b/l ULE. Sensory intact b/l ULE.     Vital Signs Last 24 Hrs  T(C): 36 (14 Sep 2022 20:15), Max: 36.6 (14 Sep 2022 12:54)  T(F): 96.8 (14 Sep 2022 20:15), Max: 97.9 (14 Sep 2022 12:54)  HR: 79 (14 Sep 2022 20:15) (79 - 89)  BP: 146/68 (14 Sep 2022 20:15) (101/60 - 155/88)  BP(mean): --  RR: 18 (14 Sep 2022 20:15) (16 - 18)  SpO2: 99% (14 Sep 2022 17:00) (99% - 99%)    Parameters below as of 14 Sep 2022 17:00  Patient On (Oxygen Delivery Method): room air    Please see the above notes for Labs and radiology.     Assessment and Plan:     61 yo M with hx of ESRD on HD (MWF), CAD s/p stent & CABG, PAD, HTN, HLD, DM II with neuropathy and TIA sent by Podiatry to ED for glass in the left foot.     Left lower extremity cellulitis  with foreign bodies  - X-ray left foot shows At least 3 punctate radiodensities are seen in the plantar soft tissues inferior to the calcaneusmeasuring up to 2 mm suggestive of foreign bodies.  - s/p ceftriaxone and flagyl in ED.   - will c/w cefepime and flagyl  - pain control prn  - local wound care.   - f/u Podiatry for surgical removal of foreign bodies.   - f/u ID   - Patient MET > 4. RCRI score is 4points (Class IV risk). Based on patient's comorbidities patient is high risk for moderate risk procedure.     ESRD on HD  - monitor electrolytes and correct prn   - c/w home med  - f/u Nephrology for HD.     CAD s/p CABG and stents  - ASA, plavix and statin, BB and lasix     HTN/HLD - c/w home med  DM II - monitor FS AC HS. Insuli reigmen    DVT ppx: heparin SC  GI ppx: PPI  Diet: renal diet  Activity: as tolerated.     Date seen by the attendin2022. Patient seen and examined at bedside independently of the residents. I read the resident's note and agree with the plan with the additions and corrections as noted below.    REVIEW OF SYSTEMS:  CONSTITUTIONAL: No weakness, fevers or chills  EYES/ENT: No visual changes;  No vertigo or throat pain   NECK: No pain or stiffness  RESPIRATORY: No cough, wheezing, hemoptysis; No shortness of breath  CARDIOVASCULAR: No chest pain or palpitations  GASTROINTESTINAL: No abdominal or epigastric pain. No nausea, vomiting, or hematemesis; No diarrhea or constipation. No melena or hematochezia.  GENITOURINARY: No dysuria, frequency or hematuria  NEUROLOGICAL: No numbness or weakness  SKIN: No itching, rashes.     PMH: ESRD on HD (MWF), CAD s/p stent & CABG, PAD, HTN, HLD, DM II with neuropathy and TIA    FHx: Reviewed. Not relevant.     Physical Exam:  GEN: No acute distress. A & O x 3.   HEENT: PEERLA. No sclera icterus. EOMI.   LUNGS: Clear to auscultation bilaterally. No wheeze/rales/crackles.   HEART: Normal. S1/S2 present. RRR. No murmur/gallops.   ABD: Soft, non-tender, non-distended. Bowel sounds present.   EXT: B/L heels ulcers. LLE wrapped with bandage and kerlix.   Integumentary: No rash, No petechia.   NEURO: CN III-XII intact. Strength: 5/5 b/l ULE. Sensory intact b/l ULE.     Vital Signs Last 24 Hrs  T(C): 36 (14 Sep 2022 20:15), Max: 36.6 (14 Sep 2022 12:54)  T(F): 96.8 (14 Sep 2022 20:15), Max: 97.9 (14 Sep 2022 12:54)  HR: 79 (14 Sep 2022 20:15) (79 - 89)  BP: 146/68 (14 Sep 2022 20:15) (101/60 - 155/88)  BP(mean): --  RR: 18 (14 Sep 2022 20:15) (16 - 18)  SpO2: 99% (14 Sep 2022 17:00) (99% - 99%)    Parameters below as of 14 Sep 2022 17:00  Patient On (Oxygen Delivery Method): room air    Please see the above notes for Labs and radiology.     Assessment and Plan:     61 yo M with hx of ESRD on HD (MWF), CAD s/p stent & CABG, PAD, HTN, HLD, DM II (on insulin) with neuropathy and TIA sent by Podiatry to ED for glass in the left foot.     Left lower extremity cellulitis  with foreign bodies  - X-ray left foot shows At least 3 punctate radiodensities are seen in the plantar soft tissues inferior to the calcaneus up to 2 mm suggestive of foreign bodies.  - s/p ceftriaxone and flagyl in ED.   - will c/w cefepime and flagyl  - pain control prn  - local wound care.   - f/u Podiatry for surgical removal of foreign bodies.   - f/u ID   - Patient MET > 4. RCRI score is 4points (Class IV risk). Based on patient's comorbidities, patient is high risk for moderate risk procedure.     ESRD on HD  - monitor electrolytes and correct prn   - c/w home med  - f/u Nephrology for HD.     CAD s/p CABG and stents  - ASA, plavix and statin, BB and lasix     HTN/HLD - c/w home med  DM II - monitor FS AC HS. Insulin regimen    DVT ppx: heparin SC  GI ppx: PPI  Diet: renal diet  Activity: as tolerated.     Date seen by the attendin2022.

## 2022-09-14 NOTE — H&P ADULT - NSHPPHYSICALEXAM_GEN_ALL_CORE
T(C): 36.6 (09-14-22 @ 12:54), Max: 36.6 (09-14-22 @ 12:54)  HR: 80 (09-14-22 @ 12:54) (80 - 80)  BP: 101/60 (09-14-22 @ 12:54) (101/60 - 101/60)  RR: 18 (09-14-22 @ 12:54) (18 - 18)  SpO2: 99% (09-14-22 @ 12:54) (99% - 99%)    PHYSICAL EXAM:  GENERAL: patient appears well, no acute distress, appropriate, pleasant  EYES: sclera clear, no exudates  ENMT: oropharynx clear without erythema, no exudates, moist mucous membranes  NECK: supple, soft, no thyromegaly noted  LUNGS: good air entry bilaterally, clear to auscultation, symmetric breath sounds, no wheezing or rhonchi appreciated  HEART: soft S1/S2, regular rate and rhythm, no murmurs noted, no lower extremity edema  GASTROINTESTINAL: abdomen is soft, nontender, nondistended, normoactive bowel sounds, no palpable masses  INTEGUMENT: good skin turgor, no lesions noted  MUSCULOSKELETAL: LLE erythema from foot to mid shin. RLE chronic ulcers with erythema  NEUROLOGIC: awake, alert, oriented x3, good muscle tone in 4 extremities, no obvious sensory deficits  PSYCHIATRIC: mood is good, affect is congruent, linear and logical thought process  HEME/LYMPH: no palpable supraclavicular nodules, no obvious ecchymosis or petechiae

## 2022-09-14 NOTE — ED PROVIDER NOTE - NS ED ROS FT
Constitutional: (-) fever  Eyes/ENT: (-) blurry vision, (-) epistaxis  Cardiovascular: (-) chest pain, (-) syncope  Respiratory: (-) cough, (-) shortness of breath  Gastrointestinal: (-) vomiting, (-) diarrhea  Musculoskeletal: (-) neck pain, (-) back pain, (-) joint pain  Integumentary: (+) L foot FB, (-) rash, (-) edema  Neurological: (-) headache, (-) altered mental status  Psychiatric: (-) hallucinations  Allergic/Immunologic: (-) pruritus

## 2022-09-14 NOTE — ED PROVIDER NOTE - OBJECTIVE STATEMENT
62y M pmh CAD, ESRD MWF, DM, Neuropathy, HTN, HLD, TIA presents for eval of L foot foreign body. Pt broke a glass kamila tray x2wks ago and had multiple pieces stuck in his L foot. Pt went to podiatrist Dr. Pacheco with attempted debridement x1wk ago but xray yesterday showed multiple retained pieces. Since pt has developed ulcerative wound to extraction site. Denies fever, dc, bleeding

## 2022-09-14 NOTE — ED PROVIDER NOTE - PHYSICAL EXAMINATION
CONST: Well appearing in NAD  EYES: Sclera and conjunctiva clear.   ENT: No nasal discharge. Oropharynx normal appearing  NECK: Non-tender, no meningeal signs. normal ROM. supple   CARD: S1 S2; No jvd  RESP: Equal BS B/L, No wheezes, rhonchi or rales. No distress  GI: Soft, non-tender, non-distended. normal BS  MS: Normal ROM in all extremities. pulses 2 +. no calf tenderness or swelling  SKIN: Two open wounds to L foot. No dc or bleeding.  NEURO: A&Ox3, No focal deficits. Strength 5/5. Neuropathy to b/l feet

## 2022-09-14 NOTE — CONSULT NOTE ADULT - SUBJECTIVE AND OBJECTIVE BOX
Podiatry Consult Note    Subjective:  SCHWABACHER, LAWRENCE  Seen Bedside 62y Male  .   Patient is a 62y old  Male who presents with a chief complaint of     Podiatry:  63yo male sent by Podiatrist Dr. Pacheco for surgical planning to extract foreign body (glass) in left foot plantar heel. Patient presents with red swollen left lower extremity with ulceration present on plantar heel were foreign body resides. Patient is not in a lot of pain. Denies f/c/n/v/sob. States the foreign body has been in his foot for about 2 weeks. He was on oral abx but did not state which kind. Has no other pedal complaints at this time.     Past Medical History and Surgical History  PAST MEDICAL & SURGICAL HISTORY:  CAD (coronary artery disease)      S/P CABG (coronary artery bypass graft)  x4      Diabetes mellitus      Transient ischemic attack (TIA)  2017; 2008      Chronic kidney disease (CKD)  Stage IV      Hypertension      Stented coronary artery  in 2008      Myocardial infarction  2012      PAD (peripheral artery disease)  S/p bypass left leg      HLD (hyperlipidemia)      BPH (benign prostatic hyperplasia)      Pain in left knee  s/p fall      OA (osteoarthritis)      [place] (hard of hearing)      Chronic anemia      S/P CABG (coronary artery bypass graft)  2012      H/O arterial bypass of lower limb  Left Lower Extremity (2016)      History of surgery  Left CEA (2017)  Left Pinkie toe Amputation (2014)  CABG x 4 (2012)  Card cath - stent (2008)        AV fistula  2019  LEFT AV FISTULA           Review of Systems:  [X] Ten point review of systems is otherwise negative except as noted     Objective:  Vital Signs Last 24 Hrs  T(C): 36.6 (14 Sep 2022 12:54), Max: 36.6 (14 Sep 2022 12:54)  T(F): 97.9 (14 Sep 2022 12:54), Max: 97.9 (14 Sep 2022 12:54)  HR: 80 (14 Sep 2022 12:54) (80 - 80)  BP: 101/60 (14 Sep 2022 12:54) (101/60 - 101/60)  BP(mean): --  RR: 18 (14 Sep 2022 12:54) (18 - 18)  SpO2: 99% (14 Sep 2022 12:54) (99% - 99%)    Parameters below as of 14 Sep 2022 12:54  Patient On (Oxygen Delivery Method): room air                            11.2   13.23 )-----------( 178      ( 14 Sep 2022 14:30 )             33.2                           Physical Exam - Lower Extremity Focused:   Derm:   Left foot plantar heel wound/puncture with suspected foreign body (glass). No drainage. Periwound erythema noted. No purulence. No malodors  Left foot ulceration measuring 2cm x 2cm x 0.1; does not probe to bone. Macerated borders. 100% granular wound base. No drainage. NO purulence. No malodors  Cellulitic left lower extremity encompassing entirety of foot .  Left 4th digit scab noted to lateral portion of digit. No periwound erythema. No signs of infection.     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 All Questions and Concerns Addressed and Answered  XR Imaging L Foot; Pending Results  No culturable material present   Local Wound Care; Wound Flushed w/ NS; Wound Packed w/ Betadine Soaked Gauze / DSD / Kerlix / ace bandage  Weight Bearing Status; WBAT w/ Heel Touch w/ Surgical Shoe;   Recommend; Lower Extremity Arterial Duplex B/L; ESR/CRP; MRI-_____ Foot;   No culturable material present   Request ID Consult;    Recommend IV abx per ID rec;   Podiatric Surgical intervention indicated; extraction of foreign body;  Plan for Sx left foot @ TBD date/time; will update once surgery scheduled  Please admit to medicine  Request medical clearance.  Will f/u with finalized surgical plan, date, time.    Discussed Plan w/ Mobilia    Podiatry  Podiatry Consult Note    Subjective:  SCHWABACHER, LAWRENCE  Seen Bedside 62y Male  .   Patient is a 62y old  Male who presents with a chief complaint of     Podiatry:  61yo male sent by Podiatrist Dr. Pacheco for surgical planning to extract foreign body (glass) in left foot plantar heel. Patient presents with red swollen left lower extremity with ulceration present on plantar heel were foreign body resides. Patient is not in a lot of pain. Denies f/c/n/v/sob. States the foreign body has been in his foot for about 2 weeks. He was on oral abx but did not state which kind. Has no other pedal complaints at this time.     Past Medical History and Surgical History  PAST MEDICAL & SURGICAL HISTORY:  CAD (coronary artery disease)      S/P CABG (coronary artery bypass graft)  x4      Diabetes mellitus      Transient ischemic attack (TIA)  2017; 2008      Chronic kidney disease (CKD)  Stage IV      Hypertension      Stented coronary artery  in 2008      Myocardial infarction  2012      PAD (peripheral artery disease)  S/p bypass left leg      HLD (hyperlipidemia)      BPH (benign prostatic hyperplasia)      Pain in left knee  s/p fall      OA (osteoarthritis)      [place] (hard of hearing)      Chronic anemia      S/P CABG (coronary artery bypass graft)  2012      H/O arterial bypass of lower limb  Left Lower Extremity (2016)      History of surgery  Left CEA (2017)  Left Pinkie toe Amputation (2014)  CABG x 4 (2012)  Card cath - stent (2008)        AV fistula  2019  LEFT AV FISTULA           Review of Systems:  [X] Ten point review of systems is otherwise negative except as noted     Objective:  Vital Signs Last 24 Hrs  T(C): 36.6 (14 Sep 2022 12:54), Max: 36.6 (14 Sep 2022 12:54)  T(F): 97.9 (14 Sep 2022 12:54), Max: 97.9 (14 Sep 2022 12:54)  HR: 80 (14 Sep 2022 12:54) (80 - 80)  BP: 101/60 (14 Sep 2022 12:54) (101/60 - 101/60)  BP(mean): --  RR: 18 (14 Sep 2022 12:54) (18 - 18)  SpO2: 99% (14 Sep 2022 12:54) (99% - 99%)    Parameters below as of 14 Sep 2022 12:54  Patient On (Oxygen Delivery Method): room air                            11.2   13.23 )-----------( 178      ( 14 Sep 2022 14:30 )             33.2                           Physical Exam - Lower Extremity Focused:   Derm:   Left foot plantar heel wound/puncture with suspected foreign body (glass). No drainage. Periwound erythema noted. No purulence. No malodors  Left foot ulceration measuring 2cm x 2cm x 0.1; does not probe to bone. Macerated borders. 100% granular wound base. No drainage. NO purulence. No malodors  Cellulitic left lower extremity encompassing entirety of foot .  Left 4th digit scab noted to lateral portion of digit. No periwound erythema. No signs of infection.     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 All Questions and Concerns Addressed and Answered  XR Imaging L Foot; Pending Results  No culturable material present   Local Wound Care; Wound Flushed w/ NS; Wound Packed w/ Betadine Soaked Gauze / DSD / Kerlix / ace bandage  Weight Bearing Status; WBAT w/ Heel Touch w/ Surgical Shoe;   Recommend; Lower Extremity Arterial Duplex B/L;   No culturable material present   Request ID Consult;    Recommend IV abx per ID rec;   Podiatric Surgical intervention indicated; extraction of foreign body;  Plan for Sx left foot @ TBD date/time; will update once surgery scheduled  Please admit to medicine  Request medical clearance.  Will f/u with finalized surgical plan, date, time.    Discussed Plan w/ Mobilia    Podiatry

## 2022-09-14 NOTE — ED PROVIDER NOTE - ATTENDING APP SHARED VISIT CONTRIBUTION OF CARE
61 y/o male with h/o esrd on HD, cad s/p stent/CABG, PVD, htn, dm, hld, tia, neuropathy, sent to ER by podiatry for glass in L foot.  Pt states he dropped an ashtray ~ 2 weeks ago and was stepping on glass shards without shoes on.  Saw his podiatrist the following week and had x-ray which showed glass in heel.  Some glass pieces removed, pt given course of abx which he finished.  had f/u appt yesterday and repeat x-ray showed additional glass in foot.  to have OR removal.  pt states foot has been a little red.  denies any f/c.  no other acute complaints.  no cp/sob.  no abd pain.  no ha/dizziness/loc. 61 y/o male with h/o esrd on HD, cad s/p stent/CABG, PVD, htn, dm, hld, tia, neuropathy, sent to ER by podiatry for glass in L foot.  Pt states he dropped an ashtray ~ 2 weeks ago and was stepping on glass shards without shoes on.  Saw his podiatrist the following week and had x-ray which showed glass in heel.  Some glass pieces removed, pt given course of abx which he finished.  had f/u appt yesterday and repeat x-ray showed additional glass in foot.  to have OR removal.  pt states foot has been a little red.  denies any f/c.  no other acute complaints.  no cp/sob.  no abd pain.  no ha/dizziness/loc.  PE - nad, nc/at, eomi, perrl, op - clear, mmm, cta b/l, no w/r/r, rrr, abd- soft, nt/nd, nabs, from x 4, LLE: foot: plantar surface of heel with puncture wound, no drainage, no erythema, A&O x 3, no focal neuro deficits.  -x-ray, abx, admission for or

## 2022-09-14 NOTE — H&P ADULT - ASSESSMENT
Pt is a 61 y/o M with PMHx of ESRD on HD MWF, CAD s/p stent and CABG, PVD, HTN, DM, HLD, TIA neuropathy was sent to the ED by podiatry for glass in the L foot. Pt reported that he dropped an ashtray about 2 weeks ago and ended up stepping on the glass shards without shoes on. He saw his podiatrist 1 week after and had a foot xray which showed glass in the heel. He had some glass pieces removed and was given a course of abx which he finished. He had a follow up appt yesterday and repeat foot xray showed additional glass. He was referred in by podiatry for OR removal. Also stated that his foot has been a little erythematous. Denies any fevers, chills, chest pain, SOB, abdominal pain, headaches, dizziness, urinary symptoms, weakness.    #LLE cellulitis in setting of foreign body  - No sepsis POA, WBC 13k  - seen by podiatry, no culturable wound. plan for OR possibly tomorrow  - xray foot showing foreign body, no signs of OM  - s/p ceftriaxone/flagyl in ED x 1  - f/u ID consult   - Pain control prn   - Local wound care for now  - monitor temp   - requires medical and cardiac clearance  - check ESR/CRP, blood cultures  - check LE arterial duplex  - cefepime/vanc/flagyl?  - NPO after midnight, preop labs    #CAD s/p stent and CABG  #PVD  #TIA  - follows with _________  - Follow up arterial duplex   - continue with home meds    #mild transaminitis  - asymptomatic, monitor      #ESRD on HD MWF  - nephro consult    #HTN  #HLD  - monitor BP  - check lipid panel  - continue with home meds    #DM with neuropathy  -Monitor blood glucose  -Check A1c  -goal glucose level: 140-180 mg/dl  -start basal/bolus insulin if FS >180    #Misc:  #DVT PPX: heparin  #GI PPX: protonix  #Diet: renal restrictions  #Activity: WBAT with surgical shoe as per podiatry  #CODE: Full  #Dispo: admit to medicine         Pt is a 61 y/o M with PMHx of ESRD on HD MWF, CAD s/p stent and CABG, PVD, HTN, DM, HLD, TIA neuropathy was sent to the ED by podiatry for glass in the L foot.    #LLE cellulitis in setting of L foot foreign body  - No sepsis POA, WBC 13k  - seen by podiatry, no culturable wound. spoke with podiatry- possible OR on Monday 9/19  - xray foot showing foreign body, no signs of OM  - s/p ceftriaxone/flagyl in ED x 1  - Pain control prn   - Local wound care for now  - monitor temp   - requires medical and cardiac clearance  - f/u ID consult   - cardio consult for Dr. Magdaleno  - check ESR/CRP, blood cultures  - check LE arterial duplex  - cefepime/vanc/flagyl?    #CAD s/p stent and CABG  - follows with Dr. Simpson   - continue with home ASA/plavix, atorvastatin, lopressor, and lasix    #TIA  - continue with home ASA/plavix    #PVD  - Follow up arterial duplex   - plan for angioplasty on 9/22 with Dr. Malik    #mild transaminitis  - asymptomatic, monitor    #ESRD on HD MWF  - nephro consult  - had HD today    #HTN  #HLD  - monitor BP  - check lipid panel  - continue with home meds    #DM with neuropathy  -Monitor blood glucose  -Check A1c  -goal glucose level: 140-180 mg/dl  -start basal/bolus insulin if FS >180    #Misc:  #DVT PPX: heparin  #GI PPX: protonix  #Diet: renal restrictions  #Activity: WBAT with surgical shoe as per podiatry  #CODE: Full  #Dispo: admit to medicine         Pt is a 63 y/o M with PMHx of ESRD on HD MWF, CAD s/p stent and CABG, PVD, HTN, DM, HLD, TIA neuropathy was sent to the ED by podiatry for glass in the L foot.    #LLE cellulitis in setting of L foot foreign body  - No sepsis POA, WBC 13k  - seen by podiatry, no culturable wound. spoke with podiatry- possible OR on Monday 9/19  - xray foot showing foreign body, no signs of OM  - s/p ceftriaxone/flagyl in ED x 1  - Pain control prn   - Local wound care for now  - monitor temp   - requires medical and cardiac clearance  - f/u ID consult   - cardio consult for Dr. Magdaleno  - check ESR/CRP, blood cultures  - check LE arterial duplex  - continue with cefepime and vancomycin x1 dose until ID f/u    #CAD s/p stent and CABG  - follows with Dr. Simpson   - continue with home ASA/plavix, atorvastatin, lopressor, and lasix  - cardio consult for clearance    #TIA  - continue with home ASA/plavix    #PVD  - Follow up arterial duplex   - plan for angioplasty on 9/22 with Dr. Malik    #mild transaminitis  - asymptomatic, monitor    #ESRD on HD MWF  - nephro consult  - had HD today    #HTN  #HLD  - monitor BP  - check lipid panel  - continue with home meds    #DM with neuropathy  -Monitor blood glucose  -Check A1c  -goal glucose level: 140-180 mg/dl  -start basal/bolus insulin if FS >180    #Misc:  #DVT PPX: heparin  #GI PPX: protonix  #Diet: renal restrictions  #Activity: WBAT with surgical shoe as per podiatry  #CODE: Full  #Dispo: admit to medicine         Pt is a 61 y/o M with PMHx of ESRD on HD MWF, CAD s/p stent and CABG, PVD, HTN, DM, HLD, TIA neuropathy was sent to the ED by podiatry for glass in the L foot.    #LLE cellulitis in setting of L foot foreign body  - No sepsis POA, WBC 13k  - seen by podiatry, no culturable wound. spoke with podiatry- possible OR on Monday 9/19  - xray foot showing foreign body, no signs of OM  - s/p ceftriaxone/flagyl in ED x 1  - Pain control prn   - Local wound care for now  - monitor temp   - requires medical and cardiac clearance  - f/u ID consult   - cardio consult for Dr. Magdaleno  - check ESR/CRP, blood cultures  - check LE arterial duplex  - continue with cefepime and vancomycin x1 dose until ID f/u    #CAD s/p stent and CABG  - follows with Dr. Simpson   - continue with home ASA/plavix, atorvastatin, lopressor, and lasix  - cardio consult for clearance    #TIA  - continue with home ASA/plavix    #PVD  - Follow up arterial duplex   - plan for angioplasty on 9/22 with Dr. Malik    #mild transaminitis  - asymptomatic, monitor    #ESRD on HD MWF  - nephro consult  - had HD today    #HTN  #HLD  - monitor BP  - check lipid panel  - continue with home meds    #DM with neuropathy  -Monitor blood glucose  -Check A1c  -goal glucose level: 140-180 mg/dl  -c/w lantus 40 units here (on levemir 40units at home)    #Misc:  #DVT PPX: heparin  #GI PPX: protonix  #Diet: renal restrictions  #Activity: WBAT with surgical shoe as per podiatry  #CODE: Full  #Dispo: admit to medicine

## 2022-09-14 NOTE — ED ADULT NURSE REASSESSMENT NOTE - NS ED NURSE REASSESS COMMENT FT1
pt is aaox3, nad, respirations easy ad regular, skin is warm, dry, and normal in color, pt is resting and comfortable

## 2022-09-14 NOTE — H&P ADULT - NSHPLABSRESULTS_GEN_ALL_CORE
11.2   13.23 )-----------( 178      ( 14 Sep 2022 14:30 )             33.2       09-14    138  |  96<L>  |  31<H>  ----------------------------<  193<H>  4.9   |  29  |  4.3<HH>    Ca    8.7      14 Sep 2022 14:30    TPro  7.1  /  Alb  3.4<L>  /  TBili  0.2  /  DBili  x   /  AST  75<H>  /  ALT  59<H>  /  AlkPhos  252<H>  09-14                  PT/INR - ( 14 Sep 2022 14:30 )   PT: 12.50 sec;   INR: 1.09 ratio         PTT - ( 14 Sep 2022 14:30 )  PTT:29.6 sec      < from: Xray Foot AP + Lateral + Oblique, Left (09.14.22 @ 14:47) >    Impression:    At least 3 punctate radiodensities are seen in the plantar soft tissues   inferior to the calcaneus measuring up to 2 mm suggestive of foreign   bodies.    --- End of Report ---  < from: Xray Foot AP + Lateral + Oblique, Left (09.14.22 @ 14:47) >    Impression:    At least 3 punctate radiodensities are seen in the plantar soft tissues   inferior to the calcaneus measuring up to 2 mm suggestive of foreign   bodies.    --- End of Report ---      < end of copied text >        < end of copied text >

## 2022-09-14 NOTE — ED PROVIDER NOTE - NSICDXPASTMEDICALHX_GEN_ALL_CORE_FT
PAST MEDICAL HISTORY:  BPH (benign prostatic hyperplasia)     CAD (coronary artery disease)     Chronic anemia     Chronic kidney disease (CKD) Stage IV    Diabetes mellitus     HLD (hyperlipidemia)     [place] (hard of hearing)     Hypertension     Myocardial infarction 2012    OA (osteoarthritis)     PAD (peripheral artery disease) S/p bypass left leg    Pain in left knee s/p fall    S/P CABG (coronary artery bypass graft) x4    Stented coronary artery in 2008    Transient ischemic attack (TIA) 2017; 2008

## 2022-09-14 NOTE — H&P ADULT - NSICDXPASTMEDICALHX_GEN_ALL_CORE_FT
PAST MEDICAL HISTORY:  BPH (benign prostatic hyperplasia)     CAD (coronary artery disease)     Chronic anemia     Chronic kidney disease (CKD) Stage IV    Diabetes mellitus     HLD (hyperlipidemia)     [place] (hard of hearing)     Hypertension     Myocardial infarction 2012    OA (osteoarthritis)     PAD (peripheral artery disease) S/p bypass left leg    Pain in left knee s/p fall    S/P CABG (coronary artery bypass graft) x4    Stented coronary artery in 2008    Transient ischemic attack (TIA) 2017; 2008

## 2022-09-14 NOTE — H&P ADULT - HISTORY OF PRESENT ILLNESS
Pt is a 61 y/o M with PMHx of ESRD on HD MWF, CAD s/p stent and CABG, PVD, HTN, DM, HLD, TIA neuropathy was sent to the ED by podiatry for glass in the L foot. Pt reported that he dropped an ashtray about 2 weeks ago and ended up stepping on the glass shards without shoes on. He saw his podiatrist 1 week after and had a foot xray which showed glass in the heel. He had some glass pieces removed and was given a course of abx which he finished. He had a follow up appt yesterday and repeat foot xray showed additional glass. He was referred in by podiatry for OR removal. Also stated that his foot has been a little erythematous. Denies any fevers, chills, chest pain, SOB, abdominal pain, headaches, dizziness, urinary symptoms, weakness.    In the ED:  - vitals: /60, HR 80, RR 18, T 97.9, sat 99% on RA  - Labs: WBC 13k, Hgb 11.2, Cr 4.3, elevated ALP and mild transaminitis  - foot xray: At least 3 punctate radiodensities are seen in the plantar soft tissues inferior to the calcaneus measuring up to 2 mm suggestive of foreign bodies.       Pt is a 61 y/o M with PMHx of ESRD on HD MWF, CAD s/p stent and CABG, PVD, HTN, DM, HLD, TIA neuropathy was sent to the ED by podiatry for glass in the L foot. Pt reported that he dropped an ashtray about 2 weeks ago and ended up stepping on the glass shards without shoes on. He saw his podiatrist 1 week after and had a foot xray which showed glass in the heel. He had some glass pieces removed and was given a course of abx which he finished. He had a follow up appt yesterday and repeat foot xray showed additional glass. He was referred in by podiatry for OR removal. Has a chronic ulcer on R heel but endorses no ulcers on the L foot, reports that L foot lower extremity has been erythematous. Denies any fevers, chills, chest pain, SOB, abdominal pain, headaches, dizziness, urinary symptoms, weakness, numbness. No other acute complaints.     In the ED:  - vitals: /60, HR 80, RR 18, T 97.9, sat 99% on RA  - Labs: WBC 13k, Hgb 11.2, Cr 4.3, elevated ALP and mild transaminitis  - foot xray: At least 3 punctate radiodensities are seen in the plantar soft tissues inferior to the calcaneus measuring up to 2 mm suggestive of foreign bodies.  - given ceftriaxone and flagyl x1 dose in the ED  - seen by podiatry and plan for OR possibly Monday

## 2022-09-14 NOTE — H&P ADULT - NSHPOUTPATIENTPROVIDERS_GEN_ALL_CORE
PCP: Dr. Sandoval  Cardiologist: Dr. Magdaleno  Nephrologist: Dr. Reeves  Neurologist: Dr. Galeana  Urologist: Dr. Bhatt  Podiatrist: Dr. Walden  Vascular surgeon: Dr. Malik

## 2022-09-14 NOTE — ED PROVIDER NOTE - CLINICAL SUMMARY MEDICAL DECISION MAKING FREE TEXT BOX
63 y/o male with pmhx as noted in ER with retained FB's in L heel (glass shards).  seen by podiatry, given abx, pt admitted for OR removal.

## 2022-09-15 LAB
A1C WITH ESTIMATED AVERAGE GLUCOSE RESULT: 6.1 % — HIGH (ref 4–5.6)
ALBUMIN SERPL ELPH-MCNC: 2.8 G/DL — LOW (ref 3.5–5.2)
ALP SERPL-CCNC: 215 U/L — HIGH (ref 30–115)
ALT FLD-CCNC: 59 U/L — HIGH (ref 0–41)
ANION GAP SERPL CALC-SCNC: 12 MMOL/L — SIGNIFICANT CHANGE UP (ref 7–14)
ANION GAP SERPL CALC-SCNC: 14 MMOL/L — SIGNIFICANT CHANGE UP (ref 7–14)
AST SERPL-CCNC: 73 U/L — HIGH (ref 0–41)
BASOPHILS # BLD AUTO: 0.04 K/UL — SIGNIFICANT CHANGE UP (ref 0–0.2)
BASOPHILS NFR BLD AUTO: 0.3 % — SIGNIFICANT CHANGE UP (ref 0–1)
BILIRUB SERPL-MCNC: <0.2 MG/DL — SIGNIFICANT CHANGE UP (ref 0.2–1.2)
BUN SERPL-MCNC: 36 MG/DL — HIGH (ref 10–20)
BUN SERPL-MCNC: 38 MG/DL — HIGH (ref 10–20)
CALCIUM SERPL-MCNC: 7.7 MG/DL — LOW (ref 8.4–10.5)
CALCIUM SERPL-MCNC: 7.8 MG/DL — LOW (ref 8.4–10.5)
CHLORIDE SERPL-SCNC: 93 MMOL/L — LOW (ref 98–110)
CHLORIDE SERPL-SCNC: 97 MMOL/L — LOW (ref 98–110)
CHOLEST SERPL-MCNC: 76 MG/DL — SIGNIFICANT CHANGE UP
CO2 SERPL-SCNC: 25 MMOL/L — SIGNIFICANT CHANGE UP (ref 17–32)
CO2 SERPL-SCNC: 26 MMOL/L — SIGNIFICANT CHANGE UP (ref 17–32)
CREAT SERPL-MCNC: 4.8 MG/DL — CRITICAL HIGH (ref 0.7–1.5)
CREAT SERPL-MCNC: 5.2 MG/DL — CRITICAL HIGH (ref 0.7–1.5)
CRP SERPL-MCNC: 161.9 MG/L — HIGH
EGFR: 12 ML/MIN/1.73M2 — LOW
EGFR: 13 ML/MIN/1.73M2 — LOW
EOSINOPHIL # BLD AUTO: 0.05 K/UL — SIGNIFICANT CHANGE UP (ref 0–0.7)
EOSINOPHIL NFR BLD AUTO: 0.4 % — SIGNIFICANT CHANGE UP (ref 0–8)
ERYTHROCYTE [SEDIMENTATION RATE] IN BLOOD: 75 MM/HR — HIGH (ref 0–10)
ESTIMATED AVERAGE GLUCOSE: 128 MG/DL — HIGH (ref 68–114)
GLUCOSE BLDC GLUCOMTR-MCNC: 162 MG/DL — HIGH (ref 70–99)
GLUCOSE BLDC GLUCOMTR-MCNC: 166 MG/DL — HIGH (ref 70–99)
GLUCOSE BLDC GLUCOMTR-MCNC: 178 MG/DL — HIGH (ref 70–99)
GLUCOSE BLDC GLUCOMTR-MCNC: 67 MG/DL — LOW (ref 70–99)
GLUCOSE SERPL-MCNC: 178 MG/DL — HIGH (ref 70–99)
GLUCOSE SERPL-MCNC: 237 MG/DL — HIGH (ref 70–99)
HCT VFR BLD CALC: 27.2 % — LOW (ref 42–52)
HCT VFR BLD CALC: 29.9 % — LOW (ref 42–52)
HDLC SERPL-MCNC: 29 MG/DL — LOW
HGB BLD-MCNC: 9.1 G/DL — LOW (ref 14–18)
HGB BLD-MCNC: 9.9 G/DL — LOW (ref 14–18)
IMM GRANULOCYTES NFR BLD AUTO: 0.8 % — HIGH (ref 0.1–0.3)
LIPID PNL WITH DIRECT LDL SERPL: 24 MG/DL — SIGNIFICANT CHANGE UP
LYMPHOCYTES # BLD AUTO: 0.64 K/UL — LOW (ref 1.2–3.4)
LYMPHOCYTES # BLD AUTO: 5.3 % — LOW (ref 20.5–51.1)
MAGNESIUM SERPL-MCNC: 1.9 MG/DL — SIGNIFICANT CHANGE UP (ref 1.8–2.4)
MCHC RBC-ENTMCNC: 31.5 PG — HIGH (ref 27–31)
MCHC RBC-ENTMCNC: 31.5 PG — HIGH (ref 27–31)
MCHC RBC-ENTMCNC: 33.1 G/DL — SIGNIFICANT CHANGE UP (ref 32–37)
MCHC RBC-ENTMCNC: 33.5 G/DL — SIGNIFICANT CHANGE UP (ref 32–37)
MCV RBC AUTO: 94.1 FL — HIGH (ref 80–94)
MCV RBC AUTO: 95.2 FL — HIGH (ref 80–94)
MONOCYTES # BLD AUTO: 0.75 K/UL — HIGH (ref 0.1–0.6)
MONOCYTES NFR BLD AUTO: 6.2 % — SIGNIFICANT CHANGE UP (ref 1.7–9.3)
NEUTROPHILS # BLD AUTO: 10.57 K/UL — HIGH (ref 1.4–6.5)
NEUTROPHILS NFR BLD AUTO: 87 % — HIGH (ref 42.2–75.2)
NON HDL CHOLESTEROL: 47 MG/DL — SIGNIFICANT CHANGE UP
NRBC # BLD: 0 /100 WBCS — SIGNIFICANT CHANGE UP (ref 0–0)
NRBC # BLD: 0 /100 WBCS — SIGNIFICANT CHANGE UP (ref 0–0)
PLATELET # BLD AUTO: 164 K/UL — SIGNIFICANT CHANGE UP (ref 130–400)
PLATELET # BLD AUTO: 165 K/UL — SIGNIFICANT CHANGE UP (ref 130–400)
POTASSIUM SERPL-MCNC: 3.5 MMOL/L — SIGNIFICANT CHANGE UP (ref 3.5–5)
POTASSIUM SERPL-MCNC: 3.7 MMOL/L — SIGNIFICANT CHANGE UP (ref 3.5–5)
POTASSIUM SERPL-SCNC: 3.5 MMOL/L — SIGNIFICANT CHANGE UP (ref 3.5–5)
POTASSIUM SERPL-SCNC: 3.7 MMOL/L — SIGNIFICANT CHANGE UP (ref 3.5–5)
PROT SERPL-MCNC: 6 G/DL — SIGNIFICANT CHANGE UP (ref 6–8)
RBC # BLD: 2.89 M/UL — LOW (ref 4.7–6.1)
RBC # BLD: 3.14 M/UL — LOW (ref 4.7–6.1)
RBC # FLD: 14.6 % — HIGH (ref 11.5–14.5)
RBC # FLD: 14.7 % — HIGH (ref 11.5–14.5)
SODIUM SERPL-SCNC: 131 MMOL/L — LOW (ref 135–146)
SODIUM SERPL-SCNC: 136 MMOL/L — SIGNIFICANT CHANGE UP (ref 135–146)
TRIGL SERPL-MCNC: 116 MG/DL — SIGNIFICANT CHANGE UP
WBC # BLD: 12.09 K/UL — HIGH (ref 4.8–10.8)
WBC # BLD: 12.15 K/UL — HIGH (ref 4.8–10.8)
WBC # FLD AUTO: 12.09 K/UL — HIGH (ref 4.8–10.8)
WBC # FLD AUTO: 12.15 K/UL — HIGH (ref 4.8–10.8)

## 2022-09-15 PROCEDURE — 99233 SBSQ HOSP IP/OBS HIGH 50: CPT

## 2022-09-15 PROCEDURE — 93925 LOWER EXTREMITY STUDY: CPT | Mod: 26

## 2022-09-15 RX ORDER — INSULIN GLARGINE 100 [IU]/ML
25 INJECTION, SOLUTION SUBCUTANEOUS AT BEDTIME
Refills: 0 | Status: DISCONTINUED | OUTPATIENT
Start: 2022-09-15 | End: 2022-09-16

## 2022-09-15 RX ADMIN — SEVELAMER CARBONATE 1600 MILLIGRAM(S): 2400 POWDER, FOR SUSPENSION ORAL at 11:53

## 2022-09-15 RX ADMIN — Medication 50 MILLIGRAM(S): at 06:00

## 2022-09-15 RX ADMIN — PANTOPRAZOLE SODIUM 40 MILLIGRAM(S): 20 TABLET, DELAYED RELEASE ORAL at 06:00

## 2022-09-15 RX ADMIN — INSULIN GLARGINE 25 UNIT(S): 100 INJECTION, SOLUTION SUBCUTANEOUS at 22:17

## 2022-09-15 RX ADMIN — Medication 100 MILLIGRAM(S): at 06:00

## 2022-09-15 RX ADMIN — SEVELAMER CARBONATE 1600 MILLIGRAM(S): 2400 POWDER, FOR SUSPENSION ORAL at 17:20

## 2022-09-15 RX ADMIN — CEFEPIME 100 MILLIGRAM(S): 1 INJECTION, POWDER, FOR SOLUTION INTRAMUSCULAR; INTRAVENOUS at 17:50

## 2022-09-15 RX ADMIN — Medication 50 MILLIGRAM(S): at 17:20

## 2022-09-15 RX ADMIN — ATORVASTATIN CALCIUM 40 MILLIGRAM(S): 80 TABLET, FILM COATED ORAL at 22:17

## 2022-09-15 RX ADMIN — Medication 1: at 17:19

## 2022-09-15 RX ADMIN — Medication 1: at 11:53

## 2022-09-15 RX ADMIN — HEPARIN SODIUM 5000 UNIT(S): 5000 INJECTION INTRAVENOUS; SUBCUTANEOUS at 06:00

## 2022-09-15 RX ADMIN — Medication 80 MILLIGRAM(S): at 06:00

## 2022-09-15 RX ADMIN — HEPARIN SODIUM 5000 UNIT(S): 5000 INJECTION INTRAVENOUS; SUBCUTANEOUS at 15:32

## 2022-09-15 RX ADMIN — SEVELAMER CARBONATE 1600 MILLIGRAM(S): 2400 POWDER, FOR SUSPENSION ORAL at 08:48

## 2022-09-15 RX ADMIN — Medication 100 MILLIGRAM(S): at 15:30

## 2022-09-15 RX ADMIN — Medication 81 MILLIGRAM(S): at 11:23

## 2022-09-15 RX ADMIN — Medication 100 MILLIGRAM(S): at 22:20

## 2022-09-15 RX ADMIN — HEPARIN SODIUM 5000 UNIT(S): 5000 INJECTION INTRAVENOUS; SUBCUTANEOUS at 22:17

## 2022-09-15 NOTE — CONSULT NOTE ADULT - ASSESSMENT
Pt is a 63 y/o M with PMHx of ESRD on HD MWF, CAD s/p stent and CABG, PVD, HTN, DM, HLD, TIA, neuropathy was sent to the ED by podiatry for glass in the L foot. Pt reported that he dropped an ashtray about 2 weeks ago and ended up stepping on the glass shards without shoes on. He saw his podiatrist 1 week after and had a foot xray which showed glass in the heel. He had some glass pieces removed and was given a course of abx which he finished. He had a follow up appt yesterday and repeat foot xray showed additional glass. He was referred in by podiatry for OR removal. Has a chronic ulcer on R heel but endorses no ulcers on the L foot, reports that L foot lower extremity has been erythematous. Denies any fevers, chills, chest pain, SOB, abdominal pain, headaches, dizziness, urinary symptoms, weakness, numbness. No other acute complaints.     PROBLEMS  L heel puncture wound with glass present .  Pt with hx DM & ESRD on HD.  - c/w cefepime flagyl and vancomycin post HD  - ESR 75 and .9  - pending LE arterial duplex    #CAD s/p stent and CABG  - follows with Dr. Simpson   - continue with home ASA/plavix, atorvastatin, lopressor, and lasix  - cardio consult for clearance    #ESRD HFD due tomorrow  renal diet   fluid restriction  check phos, cont binders    #Prominent  bowel loop on CXR  repeat KUB  serial abd exams  will follow

## 2022-09-15 NOTE — PROGRESS NOTE ADULT - SUBJECTIVE AND OBJECTIVE BOX
LAWRENCE SCHWABACHER Hospital day: 1    HPI:   61 y/o M with PMHx of ESRD on HD MWF, CAD s/p stent and CABG, PVD, HTN, DM, HLD, TIA neuropathy was sent to the ED by podiatry for glass in the L foot.    Subjective:   Complaints: none  Overnight events: none     Objective:   T(C): 36.9 (09-15-22 @ 04:38), Max: 36.9 (09-15-22 @ 04:38)  HR: 82 (09-15-22 @ 08:53) (79 - 89)  BP: 141/64 (09-15-22 @ 04:38) (101/60 - 155/88)  RR: 18 (09-15-22 @ 04:38) (16 - 18)  SpO2: 95% (09-15-22 @ 08:53) (95% - 99%)    PHYSICAL EXAM  GENERAL: Well developed, well nourished on Room air  HEENT: Normocephalic, atraumatic  PULMONARY/CARDIOVASCULAR: CTA, no ronchi or wheezes. Normal S1S2 with RRR, no murmurs  GASTROINTESTINAL: Soft, non-tender, non-distended, no guarding.  RENAL: no cva tenderness; no schneider   SKIN/EXTREMITIES: no LE edema   NEUROLOGIC/MUSCULOSKELETAL: AOx4, grossly moving all extremities, no focal deficits.    MEDICATIONS  aspirin  chewable 81 milliGRAM(s) Oral daily  atorvastatin 40 milliGRAM(s) Oral at bedtime  cefepime   IVPB 500 milliGRAM(s) IV Intermittent every 24 hours  clopidogrel Tablet 75 milliGRAM(s) Oral daily  dextrose 5%. 1000 milliLiter(s) IV Continuous <Continuous>  dextrose 5%. 1000 milliLiter(s) IV Continuous <Continuous>  dextrose 50% Injectable 25 Gram(s) IV Push once  dextrose 50% Injectable 12.5 Gram(s) IV Push once  dextrose 50% Injectable 25 Gram(s) IV Push once  dextrose Oral Gel 15 Gram(s) Oral once PRN  dextrose Oral Gel 15 Gram(s) Oral once PRN  furosemide    Tablet 80 milliGRAM(s) Oral daily  glucagon  Injectable 1 milliGRAM(s) IntraMuscular once  heparin   Injectable 5000 Unit(s) SubCutaneous every 8 hours  influenza   Vaccine 0.5 milliLiter(s) IntraMuscular once  insulin glargine Injectable (LANTUS) 25 Unit(s) SubCutaneous at bedtime  insulin lispro (ADMELOG) corrective regimen sliding scale   SubCutaneous three times a day before meals  metoprolol tartrate 50 milliGRAM(s) Oral two times a day  metroNIDAZOLE  IVPB 500 milliGRAM(s) IV Intermittent every 8 hours  pantoprazole    Tablet 40 milliGRAM(s) Oral before breakfast  sevelamer carbonate 1600 milliGRAM(s) Oral three times a day with meals      Labs:                          9.1    12.09 )-----------( 164      ( 15 Sep 2022 01:50 )             27.2   09-15    131<L>  |  93<L>  |  36<H>  ----------------------------<  237<H>  3.7   |  26  |  4.8<HH>    Ca    7.7<L>      15 Sep 2022 01:50    TPro  7.1  /  Alb  3.4<L>  /  TBili  0.2  /  DBili  x   /  AST  75<H>  /  ALT  59<H>  /  AlkPhos  252<H>  09-14      Assessment and plan:  61 y/o M with PMHx of ESRD on HD MWF, CAD s/p stent and CABG, PVD, HTN, DM, HLD, TIA neuropathy was sent to the ED by podiatry for glass in the L foot.    #LLE cellulitis in setting of L foot foreign body  - No sepsis POA  - xray foot showing foreign body, no signs of OM  - Podiatry consulted: possible OR Monday   - ID consulted and Cardio consult (preopr clearance)   - c/w cefepime flagyl and vancomycin post HD  - ESR 75 and .9  - pending LE arterial duplex    #CAD s/p stent and CABG  - follows with Dr. Simpson   - continue with home ASA/plavix, atorvastatin, lopressor, and lasix  - cardio consult for clearance    #TIA  - continue with home ASA/plavix    #PVD  - Follow up arterial duplex   - plan for angioplasty on 9/22 with Dr. Malik    #mild transaminitis  - asymptomatic, monitor    #ESRD on HD MWF  - nephro consult  - Tomorrow HD scheduled     #HTN  #HLD  - monitor BP  - pend lipid panel  - continue with home meds    #DM with neuropathy  -Monitor blood glucose  -Check A1c  -goal glucose level: 140-180 mg/dl  -c/w lantus 25 units here (on levemir 40units at home)    #Misc:  #DVT PPX: heparin  #GI PPX: protonix  #Diet: renal restrictions  #Activity: WBAT with surgical shoe as per podiatry  #CODE: Full      Disposition: unknown at this time    LAWRENCE SCHWABACHER Hospital day: 1    HPI:   61 y/o M with PMHx of ESRD on HD MWF, CAD s/p stent and CABG, PVD, HTN, DM, HLD, TIA neuropathy was sent to the ED by podiatry for glass in the L foot.    Subjective:   Complaints: none  Overnight events: none     Objective:   T(C): 36.9 (09-15-22 @ 04:38), Max: 36.9 (09-15-22 @ 04:38)  HR: 82 (09-15-22 @ 08:53) (79 - 89)  BP: 141/64 (09-15-22 @ 04:38) (101/60 - 155/88)  RR: 18 (09-15-22 @ 04:38) (16 - 18)  SpO2: 95% (09-15-22 @ 08:53) (95% - 99%)    PHYSICAL EXAM  GENERAL: Well developed, well nourished on Room air  HEENT: Normocephalic, atraumatic  PULMONARY/CARDIOVASCULAR: CTA, no ronchi or wheezes. Normal S1S2 with RRR, no murmurs  GASTROINTESTINAL: Soft, non-tender, non-distended, no guarding.  RENAL: no cva tenderness; no schneider   SKIN/EXTREMITIES: LEft LE bangaes with surrounding erythema up to mid-shins, right heel ulcer bandaged   NEUROLOGIC/MUSCULOSKELETAL: AOx4, grossly moving all extremities, no focal deficits.    MEDICATIONS  aspirin  chewable 81 milliGRAM(s) Oral daily  atorvastatin 40 milliGRAM(s) Oral at bedtime  cefepime   IVPB 500 milliGRAM(s) IV Intermittent every 24 hours  clopidogrel Tablet 75 milliGRAM(s) Oral daily  dextrose 5%. 1000 milliLiter(s) IV Continuous <Continuous>  dextrose 5%. 1000 milliLiter(s) IV Continuous <Continuous>  dextrose 50% Injectable 25 Gram(s) IV Push once  dextrose 50% Injectable 12.5 Gram(s) IV Push once  dextrose 50% Injectable 25 Gram(s) IV Push once  dextrose Oral Gel 15 Gram(s) Oral once PRN  dextrose Oral Gel 15 Gram(s) Oral once PRN  furosemide    Tablet 80 milliGRAM(s) Oral daily  glucagon  Injectable 1 milliGRAM(s) IntraMuscular once  heparin   Injectable 5000 Unit(s) SubCutaneous every 8 hours  influenza   Vaccine 0.5 milliLiter(s) IntraMuscular once  insulin glargine Injectable (LANTUS) 25 Unit(s) SubCutaneous at bedtime  insulin lispro (ADMELOG) corrective regimen sliding scale   SubCutaneous three times a day before meals  metoprolol tartrate 50 milliGRAM(s) Oral two times a day  metroNIDAZOLE  IVPB 500 milliGRAM(s) IV Intermittent every 8 hours  pantoprazole    Tablet 40 milliGRAM(s) Oral before breakfast  sevelamer carbonate 1600 milliGRAM(s) Oral three times a day with meals      Labs:                          9.1    12.09 )-----------( 164      ( 15 Sep 2022 01:50 )             27.2   09-15    131<L>  |  93<L>  |  36<H>  ----------------------------<  237<H>  3.7   |  26  |  4.8<HH>    Ca    7.7<L>      15 Sep 2022 01:50    TPro  7.1  /  Alb  3.4<L>  /  TBili  0.2  /  DBili  x   /  AST  75<H>  /  ALT  59<H>  /  AlkPhos  252<H>  09-14      Assessment and plan:  61 y/o M with PMHx of ESRD on HD MWF, CAD s/p stent and CABG, PVD, HTN, DM, HLD, TIA neuropathy was sent to the ED by podiatry for glass in the L foot.    #LLE cellulitis in setting of L foot foreign body  - No sepsis POA  - xray foot showing foreign body, no signs of OM  - Podiatry consulted: possible OR Monday   - ID consulted and Cardio consult (preopr clearance)   - c/w cefepime flagyl and vancomycin post HD  - ESR 75 and .9  - pending LE arterial duplex    #CAD s/p stent and CABG  - follows with Dr. Simpson   - continue with home ASA/plavix, atorvastatin, lopressor, and lasix  - cardio consult for clearance    #TIA  - continue with home ASA/plavix    #PVD  - Follow up arterial duplex   - plan for angioplasty on 9/22 with Dr. Malik    #mild transaminitis  - asymptomatic, monitor    #ESRD on HD MWF  - nephro consult  - Tomorrow HD scheduled     #HTN  #HLD  - monitor BP  - pend lipid panel  - continue with home meds    #DM with neuropathy  -Monitor blood glucose  -Check A1c  -goal glucose level: 140-180 mg/dl  -c/w lantus 25 units here (on levemir 40units at home)    #Misc:  #DVT PPX: heparin  #GI PPX: protonix  #Diet: renal restrictions  #Activity: WBAT with surgical shoe as per podiatry  #CODE: Full      Disposition: unknown at this time

## 2022-09-15 NOTE — CONSULT NOTE ADULT - SUBJECTIVE AND OBJECTIVE BOX
NEPHROLOGY CONSULTATION NOTE      HPI:  Pt is a 63 y/o M with PMHx of ESRD on HD MWF, CAD s/p stent and CABG, PVD, HTN, DM, HLD, TIA, neuropathy was sent to the ED by podiatry for glass in the L foot. Pt reported that he dropped an ashtray about 2 weeks ago and ended up stepping on the glass shards without shoes on. He saw his podiatrist 1 week after and had a foot xray which showed glass in the heel. He had some glass pieces removed and was given a course of abx which he finished. He had a follow up appt yesterday and repeat foot xray showed additional glass. He was referred in by podiatry for OR removal. Has a chronic ulcer on R heel but endorses no ulcers on the L foot, reports that L foot lower extremity has been erythematous. Denies any fevers, chills, chest pain, SOB, abdominal pain, headaches, dizziness, urinary symptoms, weakness, numbness. No other acute complaints.     In the ED:  - vitals: /60, HR 80, RR 18, T 97.9, sat 99% on RA  - Labs: WBC 13k, Hgb 11.2, Cr 4.3, elevated ALP and mild transaminitis  - foot xray: At least 3 punctate radiodensities are seen in the plantar soft tissues inferior to the calcaneus measuring up to 2 mm suggestive of foreign bodies.  - given ceftriaxone and flagyl x1 dose in the ED  - seen by podiatry and plan for OR possibly Monday    REnal was called for ESRD on HD MWF, last HD 9/14    PAST MEDICAL & SURGICAL HISTORY:  CAD (coronary artery disease)      S/P CABG (coronary artery bypass graft)  x4      Diabetes mellitus      Transient ischemic attack (TIA)  2017; 2008      Chronic kidney disease (CKD)  Stage IV      Hypertension      Stented coronary artery  in 2008      Myocardial infarction  2012      PAD (peripheral artery disease)  S/p bypass left leg      HLD (hyperlipidemia)      BPH (benign prostatic hyperplasia)      Pain in left knee  s/p fall      OA (osteoarthritis)      [place] (hard of hearing)      Chronic anemia      S/P CABG (coronary artery bypass graft)  2012      H/O arterial bypass of lower limb  Left Lower Extremity (2016)      History of surgery  Left CEA (2017)  Left Pinkie toe Amputation (2014)  CABG x 4 (2012)  Card cath - stent (2008)        AV fistula  2019  LEFT AV FISTULA        Allergies:  No Known Allergies    Home Medications Reviewed  Hospital Medications:   MEDICATIONS  (STANDING):  aspirin  chewable 81 milliGRAM(s) Oral daily  atorvastatin 40 milliGRAM(s) Oral at bedtime  cefepime   IVPB 500 milliGRAM(s) IV Intermittent every 24 hours  dextrose 5%. 1000 milliLiter(s) (100 mL/Hr) IV Continuous <Continuous>  dextrose 5%. 1000 milliLiter(s) (50 mL/Hr) IV Continuous <Continuous>  dextrose 50% Injectable 25 Gram(s) IV Push once  dextrose 50% Injectable 12.5 Gram(s) IV Push once  dextrose 50% Injectable 25 Gram(s) IV Push once  furosemide    Tablet 80 milliGRAM(s) Oral daily  glucagon  Injectable 1 milliGRAM(s) IntraMuscular once  heparin   Injectable 5000 Unit(s) SubCutaneous every 8 hours  influenza   Vaccine 0.5 milliLiter(s) IntraMuscular once  insulin glargine Injectable (LANTUS) 25 Unit(s) SubCutaneous at bedtime  insulin lispro (ADMELOG) corrective regimen sliding scale   SubCutaneous three times a day before meals  metoprolol tartrate 50 milliGRAM(s) Oral two times a day  metroNIDAZOLE  IVPB 500 milliGRAM(s) IV Intermittent every 8 hours  pantoprazole    Tablet 40 milliGRAM(s) Oral before breakfast  sevelamer carbonate 1600 milliGRAM(s) Oral three times a day with meals      SOCIAL HISTORY:  Denies ETOH,Smoking,   FAMILY HISTORY:  Family history of heart disease (Father)    DM (diabetes mellitus) (Mother)    ESRD (end stage renal disease) on dialysis (Mother)          REVIEW OF SYSTEMS:  CONSTITUTIONAL: No weakness, fevers or chills  EYES/ENT: No visual changes;  No vertigo or throat pain   NECK: No pain or stiffness  RESPIRATORY: No cough, wheezing, hemoptysis; No shortness of breath  CARDIOVASCULAR: No chest pain or palpitations.  GASTROINTESTINAL: No abdominal or epigastric pain. No nausea, vomiting,   LE Lt foot would  VASCULAR: No bilateral lower extremity edema.   All other review of systems is negative unless indicated above.    VITALS:  T(F): 98.5 (09-15-22 @ 13:37), Max: 98.5 (09-15-22 @ 04:38)  HR: 84 (09-15-22 @ 13:37)  BP: 143/65 (09-15-22 @ 13:37)  RR: 18 (09-15-22 @ 13:37)  SpO2: 95% (09-15-22 @ 08:53)      Weight (kg): 83.5 (09-14 @ 21:16)    I&O's Detail        PHYSICAL EXAM:  Constitutional: NAD  HEENT: anicteric sclera,  Neck: No JVD  Respiratory: CTAB, no wheezes, rales or rhonchi  Cardiovascular: S1, S2, RRR  Gastrointestinal: BS+, soft, NT/ND  Extremities: Lt foot dressed, mild edema No peripheral edema  Neurological: A/O x 3, no focal deficits  Psychiatric: Normal mood, normal affect  : No CVA tenderness. No schneider.   Skin: No rashes  Vascular Access: AVF    LABS:  09-15    136  |  97<L>  |  38<H>  ----------------------------<  178<H>  3.5   |  25  |  5.2<HH>    Ca    7.8<L>      15 Sep 2022 09:24  Mg     1.9     09-15    TPro  6.0  /  Alb  2.8<L>  /  TBili  <0.2  /  DBili      /  AST  73<H>  /  ALT  59<H>  /  AlkPhos  215<H>  09-15    Creatinine Trend: 5.2 <--, 4.8 <--, 4.3 <--, 3.4 <--, 5.1 <--                        9.9    12.15 )-----------( 165      ( 15 Sep 2022 09:24 )             29.9     Urine Studies:              RADIOLOGY & ADDITIONAL STUDIES:    < from: Xray Chest 1 View- PORTABLE-Urgent (Xray Chest 1 View- PORTABLE-Urgent .) (09.14.22 @ 14:48) >    No radiographic evidence of acute cardiopulmonary disease.    Prominent loop of bowel seen in the central abdomen. Clinical correlation   is advised.    < end of copied text >

## 2022-09-15 NOTE — CONSULT NOTE ADULT - SUBJECTIVE AND OBJECTIVE BOX
SCHWABACHER, LAWRENCE  62y, Male  Allergy: No Known Allergies      CHIEF COMPLAINT: LLE cellulitis and foreign body (14 Sep 2022 16:12)      HPI:  Pt is a 63 y/o M with PMHx of ESRD on HD MWF, CAD s/p stent and CABG, PVD, HTN, DM, HLD, TIA, neuropathy was sent to the ED by podiatry for glass in the L foot. Pt reported that he dropped an ashtray about 2 weeks ago and ended up stepping on the glass shards without shoes on. He saw his podiatrist 1 week after and had a foot xray which showed glass in the heel. He had some glass pieces removed and was given a course of abx which he finished. He had a follow up appt yesterday and repeat foot xray showed additional glass. He was referred in by podiatry for OR removal. Has a chronic ulcer on R heel but endorses no ulcers on the L foot, reports that L foot lower extremity has been erythematous. Denies any fevers, chills, chest pain, SOB, abdominal pain, headaches, dizziness, urinary symptoms, weakness, numbness. No other acute complaints.     In the ED:  - vitals: /60, HR 80, RR 18, T 97.9, sat 99% on RA  - Labs: WBC 13k, Hgb 11.2, Cr 4.3, elevated ALP and mild transaminitis  - foot xray: At least 3 punctate radiodensities are seen in the plantar soft tissues inferior to the calcaneus measuring up to 2 mm suggestive of foreign bodies.  - given ceftriaxone and flagyl x1 dose in the ED  - seen by podiatry and plan for OR possibly Monday       (14 Sep 2022 16:12)    FAMILY HISTORY:  Family history of heart disease (Father)    DM (diabetes mellitus) (Mother)    ESRD (end stage renal disease) on dialysis (Mother)      PAST MEDICAL & SURGICAL HISTORY:  CAD (coronary artery disease)      S/P CABG (coronary artery bypass graft)  x4      Diabetes mellitus      Transient ischemic attack (TIA)  2017; 2008      Chronic kidney disease (CKD)  Stage IV      Hypertension      Stented coronary artery  in 2008      Myocardial infarction  2012      PAD (peripheral artery disease)  S/p bypass left leg      HLD (hyperlipidemia)      BPH (benign prostatic hyperplasia)      Pain in left knee  s/p fall      OA (osteoarthritis)      [place] (hard of hearing)      Chronic anemia      S/P CABG (coronary artery bypass graft)  2012      H/O arterial bypass of lower limb  Left Lower Extremity (2016)      History of surgery  Left CEA (2017)  Left Pinkie toe Amputation (2014)  CABG x 4 (2012)  Card cath - stent (2008)        AV fistula  2019  LEFT AV FISTULA          SOCIAL HISTORY  Social History:  Lives at home alone.  Denies smoking, alcohol, or illicit drug use. (21 Feb 2022 12:34)        ROS  General: Denies fevers, chills, nightsweats, weight loss  HEENT: Denies headache, rhinorrhea, sore throat, eye pain  CV: Denies CP, palpitations  PULM: Denies SOB, cough  GI: Denies abdominal pain, diarrhea  : Denies dysuria, hematuria, on hemodialysis  MSK: Denies arthralgias  NEURO: Denies paresthesias, weakness  PSYCH: Denies depression    VITALS:  T(F): 98.5, Max: 98.5 (09-15-22 @ 04:38)  HR: 88  BP: 141/64  RR: 18Vital Signs Last 24 Hrs  T(C): 36.9 (15 Sep 2022 04:38), Max: 36.9 (15 Sep 2022 04:38)  T(F): 98.5 (15 Sep 2022 04:38), Max: 98.5 (15 Sep 2022 04:38)  HR: 88 (15 Sep 2022 04:38) (79 - 89)  BP: 141/64 (15 Sep 2022 04:38) (101/60 - 155/88)  BP(mean): --  RR: 18 (15 Sep 2022 04:38) (16 - 18)  SpO2: 99% (14 Sep 2022 17:00) (99% - 99%)    Parameters below as of 14 Sep 2022 17:00  Patient On (Oxygen Delivery Method): room air        PHYSICAL EXAM:  Gen: NAD, resting in bed  HEENT: Normocephalic, atraumatic  Neck: supple, no lymphadenopathy  CV: Regular rate & regular rhythm  Lungs: decreased BS at bases, no fremitus  Abdomen: Soft, BS present  Neuro: non focal, awake  Skin: L foot cellulitis, heel puncture wound without drainage, right foot ulcer    TESTS & MEASUREMENTS:                        9.1    12.09 )-----------( 164      ( 15 Sep 2022 01:50 )             27.2     09-15    131<L>  |  93<L>  |  36<H>  ----------------------------<  237<H>  3.7   |  26  |  4.8<HH>    Ca    7.7<L>      15 Sep 2022 01:50    TPro  7.1  /  Alb  3.4<L>  /  TBili  0.2  /  DBili  x   /  AST  75<H>  /  ALT  59<H>  /  AlkPhos  252<H>  09-14      LIVER FUNCTIONS - ( 14 Sep 2022 14:30 )  Alb: 3.4 g/dL / Pro: 7.1 g/dL / ALK PHOS: 252 U/L / ALT: 59 U/L / AST: 75 U/L / GGT: x                     INFECTIOUS DISEASES TESTING      RADIOLOGY & ADDITIONAL TESTS:  I have personally reviewed the last Chest xray  CXR  Xray Chest 1 View- PORTABLE-Urgent:   ACC: [id number] EXAM:  XR CHEST PORTABLE URGENT 1V                          PROCEDURE DATE:  09/14/2022          INTERPRETATION:  CLINICAL HISTORY: weakness.    COMPARISON: 9/6/2022.    TECHNIQUE: Portable frontal chest radiograph. Adequate positioning.    FINDINGS:    Support devices: None.    Cardiac/mediastinum/hilum: Stable.    Lung parenchyma/Pleura: No focal parenchymal opacities, pleural   effusions, or pneumothorax.    Skeleton/soft tissues: Prominent loop of bowel seen in the central   abdomen.      IMPRESSION:    No radiographic evidence of acute cardiopulmonary disease.    Prominent loop of bowel seen in the central abdomen. Clinical correlation   is advised.    --- End of Report ---            NUVIA VELAZCO MD; Attending Radiologist  This document has been electronically signed. Sep 14 2022  3:23PM (09-14-22 @ 14:48)      CT      CARDIOLOGY TESTING  12 Lead ECG:   Ventricular Rate 88 BPM    Atrial Rate 88 BPM    P-R Interval 164 ms    QRS Duration 102 ms    Q-T Interval 402 ms    QTC Calculation(Bazett) 486 ms    P Axis 19 degrees    R Axis -34 degrees    T Axis 106 degrees    Diagnosis Line Normal sinus rhythm  Left axis deviation  Possible Anterior infarct , age undetermined  T wave abnormality, consider lateral ischemia  Abnormal ECG    Confirmed by Yusef Isaacs ([id number]) on 9/15/2022 7:01:40 AM (09-14-22 @ 14:38)  12 Lead ECG:   Ventricular Rate 92 BPM    Atrial Rate 92 BPM    P-R Interval 178 ms    QRS Duration 92 ms    Q-T Interval 390 ms    QTC Calculation(Bazett) 482 ms    P Axis 18 degrees    R Axis 102 degrees    T Axis 95 degrees    Diagnosis Line Normal sinus rhythm  Rightward axis  Incomplete right bundle branch block  Septal infarct , age undetermined  Abnormal ECG    Confirmed by Yusef Isaacs ([id number]) on 9/8/2022 7:28:00 AM (09-07-22 @ 19:03)      MEDICATIONS  aspirin  chewable 81  atorvastatin 40  cefepime   IVPB 500  clopidogrel Tablet 75  dextrose 5%. 1000  dextrose 5%. 1000  dextrose 50% Injectable 25  dextrose 50% Injectable 12.5  dextrose 50% Injectable 25  furosemide    Tablet 80  glucagon  Injectable 1  heparin   Injectable 5000  influenza   Vaccine 0.5  insulin glargine Injectable (LANTUS) 30  insulin lispro (ADMELOG) corrective regimen sliding scale   metoprolol tartrate 50  metroNIDAZOLE  IVPB 500  pantoprazole    Tablet 40  sevelamer carbonate 1600      ANTIBIOTICS:  cefepime   IVPB 500 milliGRAM(s) IV Intermittent every 24 hours  metroNIDAZOLE  IVPB 500 milliGRAM(s) IV Intermittent every 8 hours      All available historical data has been reviewed         SCHWABACHER, LAWRENCE  62y, Male  Allergy: No Known Allergies      CHIEF COMPLAINT: LLE cellulitis and foreign body (14 Sep 2022 16:12)      HPI:  Pt is a 61 y/o M with PMHx of ESRD on HD MWF, CAD s/p stent and CABG, PVD, HTN, DM, HLD, TIA, neuropathy was sent to the ED by podiatry for glass in the L foot. Pt reported that he dropped an ashtray about 2 weeks ago and ended up stepping on the glass shards without shoes on. He saw his podiatrist 1 week after and had a foot xray which showed glass in the heel. He had some glass pieces removed and was given a course of abx which he finished. He had a follow up appt yesterday and repeat foot xray showed additional glass. He was referred in by podiatry for OR removal. Has a chronic ulcer on R heel but endorses no ulcers on the L foot, reports that L foot lower extremity has been erythematous. Denies any fevers, chills, chest pain, SOB, abdominal pain, headaches, dizziness, urinary symptoms, weakness, numbness. No other acute complaints.     In the ED:  - vitals: /60, HR 80, RR 18, T 97.9, sat 99% on RA  - Labs: WBC 13k, Hgb 11.2, Cr 4.3, elevated ALP and mild transaminitis  - foot xray: At least 3 punctate radiodensities are seen in the plantar soft tissues inferior to the calcaneus measuring up to 2 mm suggestive of foreign bodies.  - given ceftriaxone and flagyl x1 dose in the ED  - seen by podiatry and plan for OR possibly Monday       (14 Sep 2022 16:12)    FAMILY HISTORY:  Family history of heart disease (Father)    DM (diabetes mellitus) (Mother)    ESRD (end stage renal disease) on dialysis (Mother)      PAST MEDICAL & SURGICAL HISTORY:  CAD (coronary artery disease)      S/P CABG (coronary artery bypass graft)  x4      Diabetes mellitus      Transient ischemic attack (TIA)  2017; 2008      Chronic kidney disease (CKD)  Stage IV      Hypertension      Stented coronary artery  in 2008      Myocardial infarction  2012      PAD (peripheral artery disease)  S/p bypass left leg      HLD (hyperlipidemia)      BPH (benign prostatic hyperplasia)      Pain in left knee  s/p fall      OA (osteoarthritis)      [place] (hard of hearing)      Chronic anemia      S/P CABG (coronary artery bypass graft)  2012      H/O arterial bypass of lower limb  Left Lower Extremity (2016)      History of surgery  Left CEA (2017)  Left Pinkie toe Amputation (2014)  CABG x 4 (2012)  Card cath - stent (2008)        AV fistula  2019  LEFT AV FISTULA          SOCIAL HISTORY  Social History:  Lives at home alone.  Denies smoking, alcohol, or illicit drug use. (21 Feb 2022 12:34)        ROS  General: Denies fevers, chills, nightsweats, weight loss  HEENT: Denies headache, rhinorrhea, sore throat, eye pain  CV: Denies CP, palpitations  PULM: Denies SOB, cough  GI: Denies abdominal pain, diarrhea  : Denies dysuria, hematuria, on hemodialysis  MSK: Denies arthralgias  NEURO: Denies paresthesias, weakness  PSYCH: Denies depression    VITALS:  T(F): 98.5, Max: 98.5 (09-15-22 @ 04:38)  HR: 88  BP: 141/64  RR: 18Vital Signs Last 24 Hrs  T(C): 36.9 (15 Sep 2022 04:38), Max: 36.9 (15 Sep 2022 04:38)  T(F): 98.5 (15 Sep 2022 04:38), Max: 98.5 (15 Sep 2022 04:38)  HR: 88 (15 Sep 2022 04:38) (79 - 89)  BP: 141/64 (15 Sep 2022 04:38) (101/60 - 155/88)  BP(mean): --  RR: 18 (15 Sep 2022 04:38) (16 - 18)  SpO2: 99% (14 Sep 2022 17:00) (99% - 99%)    Parameters below as of 14 Sep 2022 17:00  Patient On (Oxygen Delivery Method): room air        PHYSICAL EXAM:  Gen: NAD, resting in bed  HEENT: Normocephalic, atraumatic  Neck: supple, no lymphadenopathy  CV: Regular rate & regular rhythm  Lungs: decreased BS at bases, no fremitus  Abdomen: Soft, BS present  Neuro: non focal, awake  Skin: L foot erythema and edema, non-tender, heel puncture wound without drainage, S/P left 5th toe amputation,  RLE ulcers, not infected    TESTS & MEASUREMENTS:                        9.1    12.09 )-----------( 164      ( 15 Sep 2022 01:50 )             27.2     09-15    131<L>  |  93<L>  |  36<H>  ----------------------------<  237<H>  3.7   |  26  |  4.8<HH>    Ca    7.7<L>      15 Sep 2022 01:50    TPro  7.1  /  Alb  3.4<L>  /  TBili  0.2  /  DBili  x   /  AST  75<H>  /  ALT  59<H>  /  AlkPhos  252<H>  09-14      LIVER FUNCTIONS - ( 14 Sep 2022 14:30 )  Alb: 3.4 g/dL / Pro: 7.1 g/dL / ALK PHOS: 252 U/L / ALT: 59 U/L / AST: 75 U/L / GGT: x                     INFECTIOUS DISEASES TESTING      RADIOLOGY & ADDITIONAL TESTS:  I have personally reviewed the last Chest xray  CXR  Xray Chest 1 View- PORTABLE-Urgent:   ACC: [id number] EXAM:  XR CHEST PORTABLE URGENT 1V                          PROCEDURE DATE:  09/14/2022          INTERPRETATION:  CLINICAL HISTORY: weakness.    COMPARISON: 9/6/2022.    TECHNIQUE: Portable frontal chest radiograph. Adequate positioning.    FINDINGS:    Support devices: None.    Cardiac/mediastinum/hilum: Stable.    Lung parenchyma/Pleura: No focal parenchymal opacities, pleural   effusions, or pneumothorax.    Skeleton/soft tissues: Prominent loop of bowel seen in the central   abdomen.      IMPRESSION:    No radiographic evidence of acute cardiopulmonary disease.    Prominent loop of bowel seen in the central abdomen. Clinical correlation   is advised.    --- End of Report ---            NUVIA VELAZCO MD; Attending Radiologist  This document has been electronically signed. Sep 14 2022  3:23PM (09-14-22 @ 14:48)      CT      CARDIOLOGY TESTING  12 Lead ECG:   Ventricular Rate 88 BPM    Atrial Rate 88 BPM    P-R Interval 164 ms    QRS Duration 102 ms    Q-T Interval 402 ms    QTC Calculation(Bazett) 486 ms    P Axis 19 degrees    R Axis -34 degrees    T Axis 106 degrees    Diagnosis Line Normal sinus rhythm  Left axis deviation  Possible Anterior infarct , age undetermined  T wave abnormality, consider lateral ischemia  Abnormal ECG    Confirmed by Yusef Isaacs ([id number]) on 9/15/2022 7:01:40 AM (09-14-22 @ 14:38)  12 Lead ECG:   Ventricular Rate 92 BPM    Atrial Rate 92 BPM    P-R Interval 178 ms    QRS Duration 92 ms    Q-T Interval 390 ms    QTC Calculation(Bazett) 482 ms    P Axis 18 degrees    R Axis 102 degrees    T Axis 95 degrees    Diagnosis Line Normal sinus rhythm  Rightward axis  Incomplete right bundle branch block  Septal infarct , age undetermined  Abnormal ECG    Confirmed by Yusef Isaacs ([id number]) on 9/8/2022 7:28:00 AM (09-07-22 @ 19:03)      MEDICATIONS  aspirin  chewable 81  atorvastatin 40  cefepime   IVPB 500  clopidogrel Tablet 75  dextrose 5%. 1000  dextrose 5%. 1000  dextrose 50% Injectable 25  dextrose 50% Injectable 12.5  dextrose 50% Injectable 25  furosemide    Tablet 80  glucagon  Injectable 1  heparin   Injectable 5000  influenza   Vaccine 0.5  insulin glargine Injectable (LANTUS) 30  insulin lispro (ADMELOG) corrective regimen sliding scale   metoprolol tartrate 50  metroNIDAZOLE  IVPB 500  pantoprazole    Tablet 40  sevelamer carbonate 1600      ANTIBIOTICS:  cefepime   IVPB 500 milliGRAM(s) IV Intermittent every 24 hours  metroNIDAZOLE  IVPB 500 milliGRAM(s) IV Intermittent every 8 hours      All available historical data has been reviewed

## 2022-09-15 NOTE — CONSULT NOTE ADULT - SUBJECTIVE AND OBJECTIVE BOX
Patient is a 62y old  Male who presents with a chief complaint of LLE cellulitis and foreign body (15 Sep 2022 15:04)      HPI:  Pt is a 61 y/o M with PMHx of ESRD on HD MWF, CAD s/p stent and CABG, PVD, HTN, DM, HLD, TIA neuropathy was sent to the ED by podiatry for glass in the L foot. Pt reported that he dropped an ashtray about 2 weeks ago and ended up stepping on the glass shards without shoes on. He saw his podiatrist 1 week after and had a foot xray which showed glass in the heel. He had some glass pieces removed and was given a course of abx which he finished. He had a follow up appt yesterday and repeat foot xray showed additional glass. He was referred in by podiatry for OR removal. Has a chronic ulcer on R heel but endorses no ulcers on the L foot, reports that L foot lower extremity has been erythematous. Denies any fevers, chills, chest pain, SOB, abdominal pain, headaches, dizziness, urinary symptoms, weakness, numbness. No other acute complaints.     In the ED:  - vitals: /60, HR 80, RR 18, T 97.9, sat 99% on RA  - Labs: WBC 13k, Hgb 11.2, Cr 4.3, elevated ALP and mild transaminitis  - foot xray: At least 3 punctate radiodensities are seen in the plantar soft tissues inferior to the calcaneus measuring up to 2 mm suggestive of foreign bodies.  - given ceftriaxone and flagyl x1 dose in the ED  - seen by podiatry and plan for OR possibly Monday       (14 Sep 2022 16:12)      PAST MEDICAL & SURGICAL HISTORY:  CAD (coronary artery disease)      S/P CABG (coronary artery bypass graft)  x4      Diabetes mellitus      Transient ischemic attack (TIA)  2017; 2008      Chronic kidney disease (CKD)  Stage IV      Hypertension      Stented coronary artery  in 2008      Myocardial infarction  2012      PAD (peripheral artery disease)  S/p bypass left leg      HLD (hyperlipidemia)      BPH (benign prostatic hyperplasia)      Pain in left knee  s/p fall      OA (osteoarthritis)      [place] (hard of hearing)      Chronic anemia      S/P CABG (coronary artery bypass graft)  2012      H/O arterial bypass of lower limb  Left Lower Extremity (2016)      History of surgery  Left CEA (2017)  Left Pinkie toe Amputation (2014)  CABG x 4 (2012)  Card cath - stent (2008)        AV fistula  2019  LEFT AV FISTULA          PREVIOUS DIAGNOSTIC TESTING:      ECHO  FINDINGS:    STRESS  FINDINGS:    CATHETERIZATION  FINDINGS:    MEDICATIONS  (STANDING):  aspirin  chewable 81 milliGRAM(s) Oral daily  atorvastatin 40 milliGRAM(s) Oral at bedtime  cefepime   IVPB 500 milliGRAM(s) IV Intermittent every 24 hours  dextrose 5%. 1000 milliLiter(s) (50 mL/Hr) IV Continuous <Continuous>  dextrose 5%. 1000 milliLiter(s) (100 mL/Hr) IV Continuous <Continuous>  dextrose 50% Injectable 25 Gram(s) IV Push once  dextrose 50% Injectable 12.5 Gram(s) IV Push once  dextrose 50% Injectable 25 Gram(s) IV Push once  furosemide    Tablet 80 milliGRAM(s) Oral daily  glucagon  Injectable 1 milliGRAM(s) IntraMuscular once  heparin   Injectable 5000 Unit(s) SubCutaneous every 8 hours  influenza   Vaccine 0.5 milliLiter(s) IntraMuscular once  insulin glargine Injectable (LANTUS) 25 Unit(s) SubCutaneous at bedtime  insulin lispro (ADMELOG) corrective regimen sliding scale   SubCutaneous three times a day before meals  metoprolol tartrate 50 milliGRAM(s) Oral two times a day  metroNIDAZOLE  IVPB 500 milliGRAM(s) IV Intermittent every 8 hours  pantoprazole    Tablet 40 milliGRAM(s) Oral before breakfast  sevelamer carbonate 1600 milliGRAM(s) Oral three times a day with meals    MEDICATIONS  (PRN):  dextrose Oral Gel 15 Gram(s) Oral once PRN Blood Glucose LESS THAN 70 milliGRAM(s)/deciliter  dextrose Oral Gel 15 Gram(s) Oral once PRN Blood Glucose LESS THAN 70 milliGRAM(s)/deciliter      FAMILY HISTORY:  Family history of heart disease (Father)    DM (diabetes mellitus) (Mother)    ESRD (end stage renal disease) on dialysis (Mother)        SOCIAL HISTORY:  CIGARETTES:    ALCOHOL:    REVIEW OF SYSTEMS:  CONSTITUTIONAL: No fever, weight loss, or fatigue  NECK: No pain or stiffness  RESPIRATORY: No cough, wheezing, chills or hemoptysis; No shortness of breath  CARDIOVASCULAR: No chest pain, palpitations, dizziness, or leg swelling  GASTROINTESTINAL: No abdominal or epigastric pain. No nausea, vomiting, or hematemesis; No diarrhea or constipation. No melena or hematochezia.  GENITOURINARY: No dysuria, frequency, hematuria, or incontinence  NEUROLOGICAL: No headaches, memory loss, loss of strength, numbness, or tremors  SKIN: No itching, burning, rashes, or lesions   ENDOCRINE: No heat or cold intolerance; No hair loss  MUSCULOSKELETAL: No joint pain or swelling; No muscle, back, or extremity pain  HEME/LYMPH: No easy bruising, or bleeding gums          Vital Signs Last 24 Hrs  T(C): 36.5 (15 Sep 2022 20:02), Max: 36.9 (15 Sep 2022 04:38)  T(F): 97.7 (15 Sep 2022 20:02), Max: 98.5 (15 Sep 2022 04:38)  HR: 72 (15 Sep 2022 20:02) (72 - 91)  BP: 132/69 (15 Sep 2022 20:02) (132/69 - 182/78)  BP(mean): --  RR: 18 (15 Sep 2022 20:02) (18 - 18)  SpO2: 95% (15 Sep 2022 08:53) (95% - 95%)    Parameters below as of 15 Sep 2022 08:53  Patient On (Oxygen Delivery Method): room air            PHYSICAL EXAM:  GENERAL: NAD, well-groomed, well-developed  HEAD:  Atraumatic, Normocephalic  NECK: Supple, No JVD, Normal thyroid  NERVOUS SYSTEM:  Alert & Oriented X3, Good concentration  CHEST/LUNG: Clear to percussion bilaterally; No rales, rhonchi, wheezing, or rubs  HEART: Regular rate and rhythm; No murmurs, rubs, or gallops  ABDOMEN: Soft, Nontender, Nondistended; Bowel sounds present  EXTREMITIES:  2+ Peripheral Pulses, No clubbing, cyanosis, or edema  SKIN: No rashes or lesions    INTERPRETATION OF TELEMETRY:    ECG:    I&O's Detail      LABS:                        9.9    12.15 )-----------( 165      ( 15 Sep 2022 09:24 )             29.9     09-15    136  |  97<L>  |  38<H>  ----------------------------<  178<H>  3.5   |  25  |  5.2<HH>    Ca    7.8<L>      15 Sep 2022 09:24  Mg     1.9     09-15    TPro  6.0  /  Alb  2.8<L>  /  TBili  <0.2  /  DBili  x   /  AST  73<H>  /  ALT  59<H>  /  AlkPhos  215<H>  09-15        PT/INR - ( 14 Sep 2022 14:30 )   PT: 12.50 sec;   INR: 1.09 ratio         PTT - ( 14 Sep 2022 14:30 )  PTT:29.6 sec    I&O's Summary      RADIOLOGY & ADDITIONAL STUDIES:

## 2022-09-15 NOTE — CONSULT NOTE ADULT - ASSESSMENT
ASSESSMENT  62y M admitted with SKIN FOREIGN BODY    HPI:  Pt is a 61 y/o M with PMHx of ESRD on HD MWF, CAD s/p stent and CABG, PVD, HTN, DM, HLD, TIA, neuropathy was sent to the ED by podiatry for glass in the L foot. Pt reported that he dropped an ashtray about 2 weeks ago and ended up stepping on the glass shards without shoes on. He saw his podiatrist 1 week after and had a foot xray which showed glass in the heel. He had some glass pieces removed and was given a course of abx which he finished. He had a follow up appt yesterday and repeat foot xray showed additional glass. He was referred in by podiatry for OR removal. Has a chronic ulcer on R heel but endorses no ulcers on the L foot, reports that L foot lower extremity has been erythematous. Denies any fevers, chills, chest pain, SOB, abdominal pain, headaches, dizziness, urinary symptoms, weakness, numbness. No other acute complaints.     PROBLEMS  L heel puncture wound with glass present .  Pt with hx DM & ESRD on HD.    New problem with additional W/U  acute illness with systemic symptoms     On cefepime   IVPB 500 milliGRAM(s) IV Intermittent every 24 hours  metroNIDAZOLE  IVPB 500 milliGRAM(s) IV Intermittent every 8 hours    PLAN  - Ultimate foreign body extraction in OR with wound culture  - ESR, CRP  - Continue IV Flagyl & IV Cefepime  - Repeat sodium, wbc ASSESSMENT  62y M admitted with SKIN FOREIGN BODY    HPI:  Pt is a 61 y/o M with PMHx of ESRD on HD MWF, CAD s/p stent and CABG, PVD, HTN, DM, HLD, TIA, neuropathy was sent to the ED by podiatry for glass in the L foot. Pt reported that he dropped an ashtray about 2 weeks ago and ended up stepping on the glass shards without shoes on. He saw his podiatrist 1 week after and had a foot xray which showed glass in the heel. He had some glass pieces removed and was given a course of abx which he finished. He had a follow up appt yesterday and repeat foot xray showed additional glass. He was referred in by podiatry for OR removal. Has a chronic ulcer on R heel but endorses no ulcers on the L foot, reports that L foot lower extremity has been erythematous. Denies any fevers, chills, chest pain, SOB, abdominal pain, headaches, dizziness, urinary symptoms, weakness, numbness. No other acute complaints.     PROBLEMS  L heel puncture wound with glass present .  Pt with hx DM & ESRD on HD.    New problem with additional W/U  acute illness with systemic symptoms     On cefepime   IVPB 500 milliGRAM(s) IV Intermittent every 24 hours  metroNIDAZOLE  IVPB 500 milliGRAM(s) IV Intermittent every 8 hours    PLAN  - Ultimate foreign body extraction in OR with wound culture  - ESR, CRP  - Continue IV Flagyl & IV Cefepime & post-HD Vanco 750mg  - Repeat sodium, wbc

## 2022-09-15 NOTE — PROGRESS NOTE ADULT - SUBJECTIVE AND OBJECTIVE BOX
Podiatry Progress Note    Subjective:  SCHWABACHER, LAWRENCE is a  62y Male.   Seen bedside.   Patient is a 62y old  Male who presents with a chief complaint of LLE cellulitis and foreign body (15 Sep 2022 09:54)  Discussed with patient that he will be going in for surgery tomorrow Friday 9/16 @ 1:30pm. Patient is not in any pain today. His dressings were clean/dry/intact. Patient denies f/c/n/v/sob. Has no other questions or complaints at this time.     Past Medical History and Surgical History  PAST MEDICAL & SURGICAL HISTORY:  CAD (coronary artery disease)      S/P CABG (coronary artery bypass graft)  x4      Diabetes mellitus      Transient ischemic attack (TIA)  2017; 2008      Chronic kidney disease (CKD)  Stage IV      Hypertension      Stented coronary artery  in 2008      Myocardial infarction  2012      PAD (peripheral artery disease)  S/p bypass left leg      HLD (hyperlipidemia)      BPH (benign prostatic hyperplasia)      Pain in left knee  s/p fall      OA (osteoarthritis)      [place] (hard of hearing)      Chronic anemia      S/P CABG (coronary artery bypass graft)  2012      H/O arterial bypass of lower limb  Left Lower Extremity (2016)      History of surgery  Left CEA (2017)  Left Pinkie toe Amputation (2014)  CABG x 4 (2012)  Card cath - stent (2008)        AV fistula  2019  LEFT AV FISTULA           Objective:  Vital Signs Last 24 Hrs  T(C): 36.9 (15 Sep 2022 04:38), Max: 36.9 (15 Sep 2022 04:38)  T(F): 98.5 (15 Sep 2022 04:38), Max: 98.5 (15 Sep 2022 04:38)  HR: 82 (15 Sep 2022 08:53) (79 - 89)  BP: 141/64 (15 Sep 2022 04:38) (141/64 - 155/88)  BP(mean): --  RR: 18 (15 Sep 2022 04:38) (16 - 18)  SpO2: 95% (15 Sep 2022 08:53) (95% - 99%)    Parameters below as of 15 Sep 2022 08:53  Patient On (Oxygen Delivery Method): room air                            9.9    12.15 )-----------( 165      ( 15 Sep 2022 09:24 )             29.9                 09-15    136  |  97<L>  |  38<H>  ----------------------------<  178<H>  3.5   |  25  |  5.2<HH>    Ca    7.8<L>      15 Sep 2022 09:24  Mg     1.9     09-15    TPro  6.0  /  Alb  2.8<L>  /  TBili  <0.2  /  DBili  x   /  AST  73<H>  /  ALT  59<H>  /  AlkPhos  215<H>  09-15    Radiology:    XR- Left Foot 9/14/22  Impression:  At least 3 punctate radiodensities are seen in the plantar soft tissues inferior to the calcaneus measuring up to 2 mm suggestive of foreign bodies.      Physical Exam - Lower Extremity Focused:   Derm:   Left foot plantar heel wound/puncture with suspected foreign body (glass). No drainage. Periwound erythema noted. No purulence. No malodors  Left foot ulceration measuring 2cm x 2cm x 0.1; does not probe to bone. Macerated borders. 100% granular wound base. No drainage. NO purulence. No malodors  Cellulitic left lower extremity encompassing entirety of foot .  Left 4th digit scab noted to lateral portion of digit. No periwound erythema. No signs of infection.     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 All Questions and Concerns Addressed and Answered  XR Imaging L Foot; results as stated above  Local Wound Care; Wound Flushed w/ NS; Wound Packed w/ Betadine Soaked Gauze / DSD / Kerlix / ace bandage q24  Weight Bearing Status; WBAT w/ Heel Touch w/ Surgical Shoe;   Podiatric Surgical intervention indicated; extraction of foreign body;  Plan for Sx left foot foreign body excision and debridement Friday 9/16 @ 1:30pm   Request medical clearance.  Please make patient NPO @ mn 9/15   Please optimize lab values prior to OR  Please order T&S and COAG studies prior to OR  Discussed Plan w/ Mob[facility]    Podiatry

## 2022-09-15 NOTE — CONSULT NOTE ADULT - ASSESSMENT
History   CAD CABG  MI  PCI  PVD  RT CEA  ESRD ON HD    pre op      no chest pain      at moderate  cardiac risk for podiatry  surg

## 2022-09-16 ENCOUNTER — RESULT REVIEW (OUTPATIENT)
Age: 62
End: 2022-09-16

## 2022-09-16 ENCOUNTER — TRANSCRIPTION ENCOUNTER (OUTPATIENT)
Age: 62
End: 2022-09-16

## 2022-09-16 LAB
BASOPHILS # BLD AUTO: 0.05 K/UL — SIGNIFICANT CHANGE UP (ref 0–0.2)
BASOPHILS NFR BLD AUTO: 0.3 % — SIGNIFICANT CHANGE UP (ref 0–1)
BLD GP AB SCN SERPL QL: SIGNIFICANT CHANGE UP
BUN SERPL-MCNC: 49 MG/DL — HIGH (ref 10–20)
EOSINOPHIL # BLD AUTO: 0.06 K/UL — SIGNIFICANT CHANGE UP (ref 0–0.7)
EOSINOPHIL NFR BLD AUTO: 0.4 % — SIGNIFICANT CHANGE UP (ref 0–8)
GLUCOSE BLDC GLUCOMTR-MCNC: 155 MG/DL — HIGH (ref 70–99)
GLUCOSE BLDC GLUCOMTR-MCNC: 253 MG/DL — HIGH (ref 70–99)
GLUCOSE BLDC GLUCOMTR-MCNC: 80 MG/DL — SIGNIFICANT CHANGE UP (ref 70–99)
HCT VFR BLD CALC: 28.2 % — LOW (ref 42–52)
HGB BLD-MCNC: 9.5 G/DL — LOW (ref 14–18)
IMM GRANULOCYTES NFR BLD AUTO: 0.8 % — HIGH (ref 0.1–0.3)
LYMPHOCYTES # BLD AUTO: 0.92 K/UL — LOW (ref 1.2–3.4)
LYMPHOCYTES # BLD AUTO: 5.7 % — LOW (ref 20.5–51.1)
MCHC RBC-ENTMCNC: 31.8 PG — HIGH (ref 27–31)
MCHC RBC-ENTMCNC: 33.7 G/DL — SIGNIFICANT CHANGE UP (ref 32–37)
MCV RBC AUTO: 94.3 FL — HIGH (ref 80–94)
MONOCYTES # BLD AUTO: 1.24 K/UL — HIGH (ref 0.1–0.6)
MONOCYTES NFR BLD AUTO: 7.7 % — SIGNIFICANT CHANGE UP (ref 1.7–9.3)
NEUTROPHILS # BLD AUTO: 13.65 K/UL — HIGH (ref 1.4–6.5)
NEUTROPHILS NFR BLD AUTO: 85.1 % — HIGH (ref 42.2–75.2)
NRBC # BLD: 0 /100 WBCS — SIGNIFICANT CHANGE UP (ref 0–0)
PHOSPHATE SERPL-MCNC: 3.2 MG/DL — SIGNIFICANT CHANGE UP (ref 2.1–4.9)
PLATELET # BLD AUTO: 211 K/UL — SIGNIFICANT CHANGE UP (ref 130–400)
RBC # BLD: 2.99 M/UL — LOW (ref 4.7–6.1)
RBC # FLD: 14.6 % — HIGH (ref 11.5–14.5)
WBC # BLD: 16.05 K/UL — HIGH (ref 4.8–10.8)
WBC # FLD AUTO: 16.05 K/UL — HIGH (ref 4.8–10.8)

## 2022-09-16 PROCEDURE — 88300 SURGICAL PATH GROSS: CPT | Mod: 26

## 2022-09-16 PROCEDURE — 99233 SBSQ HOSP IP/OBS HIGH 50: CPT

## 2022-09-16 PROCEDURE — 73630 X-RAY EXAM OF FOOT: CPT | Mod: 26,LT

## 2022-09-16 RX ORDER — CEFEPIME 1 G/1
500 INJECTION, POWDER, FOR SOLUTION INTRAMUSCULAR; INTRAVENOUS EVERY 24 HOURS
Refills: 0 | Status: DISCONTINUED | OUTPATIENT
Start: 2022-09-16 | End: 2022-09-20

## 2022-09-16 RX ORDER — ATORVASTATIN CALCIUM 80 MG/1
40 TABLET, FILM COATED ORAL AT BEDTIME
Refills: 0 | Status: DISCONTINUED | OUTPATIENT
Start: 2022-09-16 | End: 2022-09-20

## 2022-09-16 RX ORDER — SEVELAMER CARBONATE 2400 MG/1
1600 POWDER, FOR SUSPENSION ORAL
Refills: 0 | Status: DISCONTINUED | OUTPATIENT
Start: 2022-09-16 | End: 2022-09-20

## 2022-09-16 RX ORDER — DEXTROSE 50 % IN WATER 50 %
15 SYRINGE (ML) INTRAVENOUS ONCE
Refills: 0 | Status: DISCONTINUED | OUTPATIENT
Start: 2022-09-16 | End: 2022-09-20

## 2022-09-16 RX ORDER — MORPHINE SULFATE 50 MG/1
1 CAPSULE, EXTENDED RELEASE ORAL
Refills: 0 | Status: DISCONTINUED | OUTPATIENT
Start: 2022-09-16 | End: 2022-09-16

## 2022-09-16 RX ORDER — VANCOMYCIN HCL 1 G
750 VIAL (EA) INTRAVENOUS
Refills: 0 | Status: DISCONTINUED | OUTPATIENT
Start: 2022-09-16 | End: 2022-09-16

## 2022-09-16 RX ORDER — ASPIRIN/CALCIUM CARB/MAGNESIUM 324 MG
81 TABLET ORAL DAILY
Refills: 0 | Status: DISCONTINUED | OUTPATIENT
Start: 2022-09-16 | End: 2022-09-20

## 2022-09-16 RX ORDER — INSULIN GLARGINE 100 [IU]/ML
25 INJECTION, SOLUTION SUBCUTANEOUS AT BEDTIME
Refills: 0 | Status: DISCONTINUED | OUTPATIENT
Start: 2022-09-16 | End: 2022-09-20

## 2022-09-16 RX ORDER — DEXTROSE 50 % IN WATER 50 %
25 SYRINGE (ML) INTRAVENOUS ONCE
Refills: 0 | Status: DISCONTINUED | OUTPATIENT
Start: 2022-09-16 | End: 2022-09-20

## 2022-09-16 RX ORDER — METOPROLOL TARTRATE 50 MG
50 TABLET ORAL
Refills: 0 | Status: DISCONTINUED | OUTPATIENT
Start: 2022-09-16 | End: 2022-09-20

## 2022-09-16 RX ORDER — PANTOPRAZOLE SODIUM 20 MG/1
40 TABLET, DELAYED RELEASE ORAL
Refills: 0 | Status: DISCONTINUED | OUTPATIENT
Start: 2022-09-16 | End: 2022-09-20

## 2022-09-16 RX ORDER — DEXTROSE 50 % IN WATER 50 %
12.5 SYRINGE (ML) INTRAVENOUS ONCE
Refills: 0 | Status: DISCONTINUED | OUTPATIENT
Start: 2022-09-16 | End: 2022-09-20

## 2022-09-16 RX ORDER — METRONIDAZOLE 500 MG
500 TABLET ORAL EVERY 8 HOURS
Refills: 0 | Status: DISCONTINUED | OUTPATIENT
Start: 2022-09-16 | End: 2022-09-20

## 2022-09-16 RX ORDER — FUROSEMIDE 40 MG
80 TABLET ORAL DAILY
Refills: 0 | Status: DISCONTINUED | OUTPATIENT
Start: 2022-09-16 | End: 2022-09-20

## 2022-09-16 RX ORDER — HEPARIN SODIUM 5000 [USP'U]/ML
5000 INJECTION INTRAVENOUS; SUBCUTANEOUS EVERY 8 HOURS
Refills: 0 | Status: DISCONTINUED | OUTPATIENT
Start: 2022-09-16 | End: 2022-09-20

## 2022-09-16 RX ORDER — SODIUM CHLORIDE 9 MG/ML
1000 INJECTION INTRAMUSCULAR; INTRAVENOUS; SUBCUTANEOUS
Refills: 0 | Status: DISCONTINUED | OUTPATIENT
Start: 2022-09-16 | End: 2022-09-16

## 2022-09-16 RX ORDER — INSULIN LISPRO 100/ML
VIAL (ML) SUBCUTANEOUS
Refills: 0 | Status: DISCONTINUED | OUTPATIENT
Start: 2022-09-16 | End: 2022-09-20

## 2022-09-16 RX ORDER — SODIUM CHLORIDE 9 MG/ML
1000 INJECTION, SOLUTION INTRAVENOUS
Refills: 0 | Status: DISCONTINUED | OUTPATIENT
Start: 2022-09-16 | End: 2022-09-20

## 2022-09-16 RX ORDER — VANCOMYCIN HCL 1 G
750 VIAL (EA) INTRAVENOUS ONCE
Refills: 0 | Status: COMPLETED | OUTPATIENT
Start: 2022-09-16 | End: 2022-09-16

## 2022-09-16 RX ORDER — GLUCAGON INJECTION, SOLUTION 0.5 MG/.1ML
1 INJECTION, SOLUTION SUBCUTANEOUS ONCE
Refills: 0 | Status: DISCONTINUED | OUTPATIENT
Start: 2022-09-16 | End: 2022-09-20

## 2022-09-16 RX ADMIN — Medication 50 MILLIGRAM(S): at 05:11

## 2022-09-16 RX ADMIN — HEPARIN SODIUM 5000 UNIT(S): 5000 INJECTION INTRAVENOUS; SUBCUTANEOUS at 14:31

## 2022-09-16 RX ADMIN — Medication 100 MILLIGRAM(S): at 05:11

## 2022-09-16 RX ADMIN — INSULIN GLARGINE 25 UNIT(S): 100 INJECTION, SOLUTION SUBCUTANEOUS at 22:53

## 2022-09-16 RX ADMIN — Medication 50 MILLIGRAM(S): at 17:26

## 2022-09-16 RX ADMIN — Medication 100 MILLIGRAM(S): at 22:54

## 2022-09-16 RX ADMIN — Medication 81 MILLIGRAM(S): at 17:26

## 2022-09-16 RX ADMIN — Medication 80 MILLIGRAM(S): at 05:10

## 2022-09-16 RX ADMIN — Medication 100 MILLIGRAM(S): at 14:43

## 2022-09-16 RX ADMIN — PANTOPRAZOLE SODIUM 40 MILLIGRAM(S): 20 TABLET, DELAYED RELEASE ORAL at 05:11

## 2022-09-16 RX ADMIN — Medication 250 MILLIGRAM(S): at 17:25

## 2022-09-16 RX ADMIN — CEFEPIME 100 MILLIGRAM(S): 1 INJECTION, POWDER, FOR SOLUTION INTRAMUSCULAR; INTRAVENOUS at 16:06

## 2022-09-16 RX ADMIN — Medication 1: at 17:25

## 2022-09-16 RX ADMIN — SEVELAMER CARBONATE 1600 MILLIGRAM(S): 2400 POWDER, FOR SUSPENSION ORAL at 17:26

## 2022-09-16 RX ADMIN — HEPARIN SODIUM 5000 UNIT(S): 5000 INJECTION INTRAVENOUS; SUBCUTANEOUS at 22:53

## 2022-09-16 RX ADMIN — ATORVASTATIN CALCIUM 40 MILLIGRAM(S): 80 TABLET, FILM COATED ORAL at 22:54

## 2022-09-16 NOTE — PROGRESS NOTE ADULT - ATTENDING COMMENTS
patient seen and examined at bedside independently of medical resident and agree with the note unless otherwise stated     Vital Signs Last 24 Hrs  T(C): 36.9 (15 Sep 2022 13:37), Max: 36.9 (15 Sep 2022 04:38)  T(F): 98.5 (15 Sep 2022 13:37), Max: 98.5 (15 Sep 2022 04:38)  HR: 84 (15 Sep 2022 13:37) (79 - 89)  BP: 143/65 (15 Sep 2022 13:37) (141/64 - 155/88)  BP(mean): --  RR: 18 (15 Sep 2022 13:37) (16 - 18)  SpO2: 95% (15 Sep 2022 08:53) (95% - 99%)    Parameters below as of 15 Sep 2022 08:53  Patient On (Oxygen Delivery Method): room air                          9.9    12.15 )-----------( 165      ( 15 Sep 2022 09:24 )             29.9   09-15    136  |  97<L>  |  38<H>  ----------------------------<  178<H>  3.5   |  25  |  5.2<HH>    Ca    7.8<L>      15 Sep 2022 09:24  Mg     1.9     09-15    TPro  6.0  /  Alb  2.8<L>  /  TBili  <0.2  /  DBili  x   /  AST  73<H>  /  ALT  59<H>  /  AlkPhos  215<H>  09-15    # Foreign object L foot   # LLE Cellulitis   # ESRD -on HD MWF  # CAD/PCI/CABG     -IV abx, pan cultures   -ID and Podiatry follow ups   -pending cardio consult for OR clearance - senior resident awaiting Dr. Hobson's call back - ECHO ordered   -continue with current maintenance meds   -skin care as per nursing   -patient aware and agreeable for current plan of care     Attending Physician Dr. Annette Augustin # [id number]
patient seen and examined at bedside independently of medical resident and agree with the note unless otherwise stated     Vital Signs Last 24 Hrs  T(C): 36.7 (16 Sep 2022 14:53), Max: 37.6 (15 Sep 2022 20:02)  T(F): 98 (16 Sep 2022 14:53), Max: 99.7 (15 Sep 2022 20:02)  HR: 83 (16 Sep 2022 14:53) (82 - 91)  BP: 165/72 (16 Sep 2022 14:53) (161/73 - 184/79)  BP(mean): --  RR: 18 (16 Sep 2022 14:53) (18 - 18)  SpO2: 97% (16 Sep 2022 14:53) (95% - 100%)    Parameters below as of 16 Sep 2022 14:53  Patient On (Oxygen Delivery Method): room air                                   9.5    16.05 )-----------( 211      ( 16 Sep 2022 10:07 )             28.2   09-16    135  |  96<L>  |  49<H>  ----------------------------<  62<L>  4.0   |  23  |  6.3<HH>    Ca    7.8<L>      16 Sep 2022 10:07  Phos  3.2     09-16  Mg     1.9     09-15    TPro  6.1  /  Alb  2.9<L>  /  TBili  0.2  /  DBili  x   /  AST  46<H>  /  ALT  53<H>  /  AlkPhos  206<H>  09-16    # Foreign object L foot   # LLE Cellulitis   # ESRD -on HD MWF  # CAD/PCI/CABG     -IV abx, pan cultures   -ID and Podiatry following - OR today for glass particles removal - cleared by Cardio for today's procedure   -continue with current maintenance meds   -skin care as per nursing   -patient aware and agreeable for current plan of care     Attending Physician Dr. Annette Augustin # [id number]

## 2022-09-16 NOTE — PROGRESS NOTE ADULT - SUBJECTIVE AND OBJECTIVE BOX
SCHWABACHER, LAWRENCE  62y, Male  Allergy: No Known Allergies      CHIEF COMPLAINT: LLE cellulitis and foreign body (15 Sep 2022 20:05)      INTERVAL EVENTS/HPI  - No acute events overnight  - T(F): , Max: 99.7 (09-15-22 @ 20:02)  - Denies any worsening symptoms  - Tolerating medication  - WBC Count: 12.15 K/uL (09-15-22 @ 09:24)      ROS  General: Denies fevers, chills, nightsweats, weight loss  HEENT: Denies headache, rhinorrhea, sore throat, eye pain  CV: Denies CP, palpitations  PULM: Denies SOB, cough  GI: Denies abdominal pain, diarrhea  : Denies dysuria, hematuria  MSK: Denies arthralgias  SKIN: Denies rash   NEURO: Denies paresthesias, weakness  PSYCH: Denies depression    FH non-contributory   Social Hx non-contributory    VITALS:  T(F): 98.2, Max: 99.7 (09-15-22 @ 20:02)  HR: 83  BP: 161/78  RR: 18Vital Signs Last 24 Hrs  T(C): 36.8 (16 Sep 2022 06:33), Max: 37.6 (15 Sep 2022 20:02)  T(F): 98.2 (16 Sep 2022 06:33), Max: 99.7 (15 Sep 2022 20:02)  HR: 83 (16 Sep 2022 08:05) (82 - 91)  BP: 161/78 (16 Sep 2022 06:33) (143/65 - 184/79)  BP(mean): --  RR: 18 (16 Sep 2022 06:33) (18 - 18)  SpO2: 98% (16 Sep 2022 08:05) (95% - 98%)    Parameters below as of 16 Sep 2022 08:05  Patient On (Oxygen Delivery Method): room air        PHYSICAL EXAM:  Gen: NAD, resting in bed  HEENT: Normocephalic, atraumatic  Neck: supple, no lymphadenopathy  CV: Regular rate & regular rhythm  Lungs: decreased BS at bases, no fremitus  Abdomen: Soft, BS present  Neuro: non focal, awake  Skin: L foot erythema and edema, non-tender, heel puncture wound without drainage, S/P left 5th toe amputation,  RLE ulcers, not infected      TESTS & MEASUREMENTS:                        9.9    12.15 )-----------( 165      ( 15 Sep 2022 09:24 )             29.9     09-15    136  |  97<L>  |  38<H>  ----------------------------<  178<H>  3.5   |  25  |  5.2<HH>    Ca    7.8<L>      15 Sep 2022 09:24  Mg     1.9     09-15    TPro  6.0  /  Alb  2.8<L>  /  TBili  <0.2  /  DBili  x   /  AST  73<H>  /  ALT  59<H>  /  AlkPhos  215<H>  09-15      LIVER FUNCTIONS - ( 15 Sep 2022 09:24 )  Alb: 2.8 g/dL / Pro: 6.0 g/dL / ALK PHOS: 215 U/L / ALT: 59 U/L / AST: 73 U/L / GGT: x               Culture - Blood (collected 09-14-22 @ 20:10)  Source: .Blood Blood  Preliminary Report (09-16-22 @ 02:01):    No growth to date.    Culture - Blood (collected 09-14-22 @ 17:50)  Source: .Blood Blood-Peripheral  Preliminary Report (09-16-22 @ 01:02):    No growth to date.            INFECTIOUS DISEASES TESTING      RADIOLOGY & ADDITIONAL TESTS:  I have personally reviewed the last Chest xray  CXR  Xray Chest 1 View- PORTABLE-Urgent:   ACC: [id number] EXAM:  XR CHEST PORTABLE URGENT 1V                          PROCEDURE DATE:  09/14/2022          INTERPRETATION:  CLINICAL HISTORY: weakness.    COMPARISON: 9/6/2022.    TECHNIQUE: Portable frontal chest radiograph. Adequate positioning.    FINDINGS:    Support devices: None.    Cardiac/mediastinum/hilum: Stable.    Lung parenchyma/Pleura: No focal parenchymal opacities, pleural   effusions, or pneumothorax.    Skeleton/soft tissues: Prominent loop of bowel seen in the central   abdomen.      IMPRESSION:    No radiographic evidence of acute cardiopulmonary disease.    Prominent loop of bowel seen in the central abdomen. Clinical correlation   is advised.    --- End of Report ---            NUVIA VELAZCO MD; Attending Radiologist  This document has been electronically signed. Sep 14 2022  3:23PM (09-14-22 @ 14:48)      CT      CARDIOLOGY TESTING  12 Lead ECG:   Ventricular Rate 88 BPM    Atrial Rate 88 BPM    P-R Interval 164 ms    QRS Duration 102 ms    Q-T Interval 402 ms    QTC Calculation(Bazett) 486 ms    P Axis 19 degrees    R Axis -34 degrees    T Axis 106 degrees    Diagnosis Line Normal sinus rhythm  Left axis deviation  Possible Anterior infarct , age undetermined  T wave abnormality, consider lateral ischemia  Abnormal ECG    Confirmed by Yusef Isaacs ([id number]) on 9/15/2022 7:01:40 AM (09-14-22 @ 14:38)  12 Lead ECG:   Ventricular Rate 92 BPM    Atrial Rate 92 BPM    P-R Interval 178 ms    QRS Duration 92 ms    Q-T Interval 390 ms    QTC Calculation(Bazett) 482 ms    P Axis 18 degrees    R Axis 102 degrees    T Axis 95 degrees    Diagnosis Line Normal sinus rhythm  Rightward axis  Incomplete right bundle branch block  Septal infarct , age undetermined  Abnormal ECG    Confirmed by Yusef Isaacs ([id number]) on 9/8/2022 7:28:00 AM (09-07-22 @ 19:03)      MEDICATIONS  aspirin  chewable 81  atorvastatin 40  cefepime   IVPB 500  dextrose 5%. 1000  dextrose 5%. 1000  dextrose 50% Injectable 25  dextrose 50% Injectable 12.5  dextrose 50% Injectable 25  furosemide    Tablet 80  glucagon  Injectable 1  heparin   Injectable 5000  influenza   Vaccine 0.5  insulin glargine Injectable (LANTUS) 25  insulin lispro (ADMELOG) corrective regimen sliding scale   metoprolol tartrate 50  metroNIDAZOLE  IVPB 500  pantoprazole    Tablet 40  sevelamer carbonate 1600      ANTIBIOTICS:  cefepime   IVPB 500 milliGRAM(s) IV Intermittent every 24 hours  metroNIDAZOLE  IVPB 500 milliGRAM(s) IV Intermittent every 8 hours      All available historical data has been reviewed

## 2022-09-16 NOTE — CHART NOTE - NSCHARTNOTEFT_GEN_A_CORE
PACU ANESTHESIA ADMISSION NOTE      Procedure: Removal, foreign body, subcutaneous, foot    Post op diagnosis:  Foreign body in foot      __x__  Patent Airway    __x__  Full return of protective reflexes    _x___  Full recovery from anesthesia / back to baseline status    Vitals:  T(C): 98.5 F  HR: 85  BP: 174/74  RR: 16  SpO2: 99%    Mental Status:  __x__ Awake   __x___ Alert   _____ Drowsy   _____ Sedated    Nausea/Vomiting:  _x___ NO  ______Yes,   See Post - Op Orders          Pain Scale (0-10):  _____    Treatment: ____ None    __x__ See Post - Op/PCA Orders    Post - Operative Fluids:   ____ Oral   __x__ See Post - Op Orders    Plan: Discharge:   ____Home       __x___Floor     _____Critical Care    _____  Other:_________________    Comments: uneventful anesthesia course no complications. Vitals stable. Pt transferred to PACU. Transfer back to floor when criteria is met

## 2022-09-16 NOTE — PRE-ANESTHESIA EVALUATION ADULT - NSANTHADDINFOFT_GEN_ALL_CORE
risks, benefits, alternatives, general anesthesia as a backup discussed with the patient and he agrees to proceed as planned risks, benefits, alternatives, general anesthesia as a backup discussed with the patient and he agrees to proceed as planned. pt seen, examined, consent obtained prior to transport to OR

## 2022-09-16 NOTE — PROGRESS NOTE ADULT - SUBJECTIVE AND OBJECTIVE BOX
Nephrology progress note    Patient is seen and examined, events over the last 24 h noted .    Allergies:  No Known Allergies    Hospital Medications:   MEDICATIONS  (STANDING):  aspirin  chewable 81 milliGRAM(s) Oral daily  atorvastatin 40 milliGRAM(s) Oral at bedtime  cefepime   IVPB 500 milliGRAM(s) IV Intermittent every 24 hours  dextrose 5%. 1000 milliLiter(s) (100 mL/Hr) IV Continuous <Continuous>  dextrose 5%. 1000 milliLiter(s) (50 mL/Hr) IV Continuous <Continuous>  dextrose 50% Injectable 25 Gram(s) IV Push once  dextrose 50% Injectable 12.5 Gram(s) IV Push once  dextrose 50% Injectable 25 Gram(s) IV Push once  furosemide    Tablet 80 milliGRAM(s) Oral daily  glucagon  Injectable 1 milliGRAM(s) IntraMuscular once  heparin   Injectable 5000 Unit(s) SubCutaneous every 8 hours  influenza   Vaccine 0.5 milliLiter(s) IntraMuscular once  insulin glargine Injectable (LANTUS) 25 Unit(s) SubCutaneous at bedtime  insulin lispro (ADMELOG) corrective regimen sliding scale   SubCutaneous three times a day before meals  metoprolol tartrate 50 milliGRAM(s) Oral two times a day  metroNIDAZOLE  IVPB 500 milliGRAM(s) IV Intermittent every 8 hours  pantoprazole    Tablet 40 milliGRAM(s) Oral before breakfast  sevelamer carbonate 1600 milliGRAM(s) Oral three times a day with meals        VITALS:  T(F): 98.2 (09-16-22 @ 06:33), Max: 99.7 (09-15-22 @ 20:02)  HR: 82 (09-16-22 @ 08:20)  BP: 167/84 (09-16-22 @ 08:20)  RR: 18 (09-16-22 @ 08:20)  SpO2: 98% (09-16-22 @ 08:05)  Wt(kg): --    Height (cm): 180.3 (09-16 @ 06:33)  Weight (kg): 83.5 (09-16 @ 06:33)  BMI (kg/m2): 25.7 (09-16 @ 06:33)  BSA (m2): 2.04 (09-16 @ 06:33)    PHYSICAL EXAM:  Constitutional: NAD  HEENT: anicteric sclera, oropharynx clear, MMM  Neck: No JVD  Respiratory: CTA  Cardiovascular: S1, S2, RRR  Gastrointestinal: BS+, soft, NT/ND  Extremities: No peripheral edema, Lt LE redness  Neurological: A/O x 3  : No CVA tenderness. No schneider.   Skin: No rashes  Vascular Access: AVF    LABS:  09-15    136  |  97<L>  |  38<H>  ----------------------------<  178<H>  3.5   |  25  |  5.2<HH>    Ca    7.8<L>      15 Sep 2022 09:24  Mg     1.9     09-15    TPro  6.0  /  Alb  2.8<L>  /  TBili  <0.2  /  DBili      /  AST  73<H>  /  ALT  59<H>  /  AlkPhos  215<H>  09-15                          9.9    12.15 )-----------( 165      ( 15 Sep 2022 09:24 )             29.9       Urine Studies:      RADIOLOGY & ADDITIONAL STUDIES:  < from: Xray Chest 1 View- PORTABLE-Urgent (Xray Chest 1 View- PORTABLE-Urgent .) (09.14.22 @ 14:48) >    No radiographic evidence of acute cardiopulmonary disease.    Prominent loop of bowel seen in the central abdomen. Clinical correlation   is advised.    < end of copied text >

## 2022-09-16 NOTE — PROGRESS NOTE ADULT - SUBJECTIVE AND OBJECTIVE BOX
LAWRENCE SCHWABACHER Hospital day: 2    HPI:   61 y/o M with PMHx of ESRD on HD MWF, CAD s/p stent and CABG, PVD, HTN, DM, HLD, TIA neuropathy was sent to the ED by podiatry for glass in the L foot.    Subjective:   Complaints: none  Overnight events: none     Objective:   Vital Signs Last 24 Hrs  T(C): 36.8 (16 Sep 2022 06:33), Max: 37.6 (15 Sep 2022 20:02)  T(F): 98.2 (16 Sep 2022 06:33), Max: 99.7 (15 Sep 2022 20:02)  HR: 82 (16 Sep 2022 08:20) (82 - 91)  BP: 167/84 (16 Sep 2022 08:20) (143/65 - 184/79)  BP(mean): --  RR: 18 (16 Sep 2022 08:20) (18 - 18)  SpO2: 98% (16 Sep 2022 08:05) (98% - 98%)    Parameters below as of 16 Sep 2022 08:20  Patient On (Oxygen Delivery Method): room air    PHYSICAL EXAM  GENERAL: Well developed, well nourished on Room air  HEENT: Normocephalic, atraumatic  PULMONARY/CARDIOVASCULAR: CTA, no ronchi or wheezes. Normal S1S2 with RRR, no murmurs  GASTROINTESTINAL: Soft, non-tender, non-distended, no guarding.  RENAL: no cva tenderness; no schneider   SKIN/EXTREMITIES: LEft LE bangaes with surrounding erythema up to mid-shins, right heel ulcer bandaged   NEUROLOGIC/MUSCULOSKELETAL: AOx4, grossly moving all extremities, no focal deficits.    MEDICATIONS  (STANDING):  aspirin  chewable 81 milliGRAM(s) Oral daily  atorvastatin 40 milliGRAM(s) Oral at bedtime  cefepime   IVPB 500 milliGRAM(s) IV Intermittent every 24 hours  dextrose 5%. 1000 milliLiter(s) (100 mL/Hr) IV Continuous <Continuous>  dextrose 5%. 1000 milliLiter(s) (50 mL/Hr) IV Continuous <Continuous>  dextrose 50% Injectable 25 Gram(s) IV Push once  dextrose 50% Injectable 12.5 Gram(s) IV Push once  dextrose 50% Injectable 25 Gram(s) IV Push once  furosemide    Tablet 80 milliGRAM(s) Oral daily  glucagon  Injectable 1 milliGRAM(s) IntraMuscular once  heparin   Injectable 5000 Unit(s) SubCutaneous every 8 hours  influenza   Vaccine 0.5 milliLiter(s) IntraMuscular once  insulin glargine Injectable (LANTUS) 25 Unit(s) SubCutaneous at bedtime  insulin lispro (ADMELOG) corrective regimen sliding scale   SubCutaneous three times a day before meals  metoprolol tartrate 50 milliGRAM(s) Oral two times a day  metroNIDAZOLE  IVPB 500 milliGRAM(s) IV Intermittent every 8 hours  pantoprazole    Tablet 40 milliGRAM(s) Oral before breakfast  sevelamer carbonate 1600 milliGRAM(s) Oral three times a day with meals  vancomycin  IVPB 750 milliGRAM(s) IV Intermittent every 48 hours    MEDICATIONS  (PRN):  dextrose Oral Gel 15 Gram(s) Oral once PRN Blood Glucose LESS THAN 70 milliGRAM(s)/deciliter  dextrose Oral Gel 15 Gram(s) Oral once PRN Blood Glucose LESS THAN 70 milliGRAM(s)/deciliter      Labs:                          9.9    12.15 )-----------( 165      ( 15 Sep 2022 09:24 )             29.9   09-15    136  |  97<L>  |  38<H>  ----------------------------<  178<H>  3.5   |  25  |  5.2<HH>    Ca    7.8<L>      15 Sep 2022 09:24  Mg     1.9     09-15    TPro  6.0  /  Alb  2.8<L>  /  TBili  <0.2  /  DBili  x   /  AST  73<H>  /  ALT  59<H>  /  AlkPhos  215<H>  09-15    Assessment and plan:  61 y/o M with PMHx of ESRD on HD MWF, CAD s/p stent and CABG, PVD, HTN, DM, HLD, TIA neuropathy was sent to the ED by podiatry for glass in the L foot.    #LLE cellulitis in setting of L foot foreign body  - No sepsis POA  - xray foot showing foreign body, no signs of OM  - Podiatry consulted  - ID consulted and Cardio consult  - c/w cefepime flagyl and vancomycin post HD  - Vanco level tomorrow AM   - ESR 75 and .9  - LE arterial duplex: mild stenosis in distal popliteal artery   - Today OR with podiatry     #CAD s/p stent and CABG  - follows with Dr. Simpson   - continue with home ASA/plavix, atorvastatin, lopressor, and lasix    #TIA  - continue with home ASA/plavix    #PVD  - Follow up arterial duplex   - plan for angioplasty on 9/22 with Dr. Malik    #mild transaminitis  - asymptomatic, monitor    #ESRD on HD MWF  - nephro consult  - Today HD scheduled     #HTN  #HLD  - monitor BP  - pend lipid panel  - continue with home meds    #DM with neuropathy  -Monitor blood glucose  -HbA1c: 3.1. At target   -goal glucose level: 140-180 mg/dl  -c/w lantus 25 units here (on levemir 40units at home)    #Misc:  #DVT PPX: heparin  #GI PPX: protonix  #Diet: renal restrictions  #Activity: WBAT with surgical shoe as per podiatry  #CODE: Full      Disposition: unknown at this time    LAWRENCE SCHWABACHER Hospital day: 2    HPI:   63 y/o M with PMHx of ESRD on HD MWF, CAD s/p stent and CABG, PVD, HTN, DM, HLD, TIA neuropathy was sent to the ED by podiatry for glass in the L foot.    Subjective:   Complaints: none  Overnight events: none     Objective:   Vital Signs Last 24 Hrs  T(C): 36.8 (16 Sep 2022 06:33), Max: 37.6 (15 Sep 2022 20:02)  T(F): 98.2 (16 Sep 2022 06:33), Max: 99.7 (15 Sep 2022 20:02)  HR: 82 (16 Sep 2022 08:20) (82 - 91)  BP: 167/84 (16 Sep 2022 08:20) (143/65 - 184/79)  BP(mean): --  RR: 18 (16 Sep 2022 08:20) (18 - 18)  SpO2: 98% (16 Sep 2022 08:05) (98% - 98%)    Parameters below as of 16 Sep 2022 08:20  Patient On (Oxygen Delivery Method): room air    PHYSICAL EXAM  GENERAL: Well developed, well nourished on Room air  HEENT: Normocephalic, atraumatic  PULMONARY/CARDIOVASCULAR: CTA, no ronchi or wheezes. Normal S1S2 with RRR, no murmurs  GASTROINTESTINAL: Soft, non-tender, non-distended, no guarding.  RENAL: no cva tenderness; no schneider   SKIN/EXTREMITIES: LEft LE bangaes with surrounding erythema up to mid-shins, right heel ulcer bandaged   NEUROLOGIC/MUSCULOSKELETAL: AOx4, grossly moving all extremities, no focal deficits.    MEDICATIONS  (STANDING):  aspirin  chewable 81 milliGRAM(s) Oral daily  atorvastatin 40 milliGRAM(s) Oral at bedtime  cefepime   IVPB 500 milliGRAM(s) IV Intermittent every 24 hours  dextrose 5%. 1000 milliLiter(s) (100 mL/Hr) IV Continuous <Continuous>  dextrose 5%. 1000 milliLiter(s) (50 mL/Hr) IV Continuous <Continuous>  dextrose 50% Injectable 25 Gram(s) IV Push once  dextrose 50% Injectable 12.5 Gram(s) IV Push once  dextrose 50% Injectable 25 Gram(s) IV Push once  furosemide    Tablet 80 milliGRAM(s) Oral daily  glucagon  Injectable 1 milliGRAM(s) IntraMuscular once  heparin   Injectable 5000 Unit(s) SubCutaneous every 8 hours  influenza   Vaccine 0.5 milliLiter(s) IntraMuscular once  insulin glargine Injectable (LANTUS) 25 Unit(s) SubCutaneous at bedtime  insulin lispro (ADMELOG) corrective regimen sliding scale   SubCutaneous three times a day before meals  metoprolol tartrate 50 milliGRAM(s) Oral two times a day  metroNIDAZOLE  IVPB 500 milliGRAM(s) IV Intermittent every 8 hours  pantoprazole    Tablet 40 milliGRAM(s) Oral before breakfast  sevelamer carbonate 1600 milliGRAM(s) Oral three times a day with meals  vancomycin  IVPB 750 milliGRAM(s) IV Intermittent every 48 hours    MEDICATIONS  (PRN):  dextrose Oral Gel 15 Gram(s) Oral once PRN Blood Glucose LESS THAN 70 milliGRAM(s)/deciliter  dextrose Oral Gel 15 Gram(s) Oral once PRN Blood Glucose LESS THAN 70 milliGRAM(s)/deciliter      Labs:                          9.5    16.05 )-----------( 211      ( 16 Sep 2022 10:07 )             28.2     09-15    136  |  97<L>  |  38<H>  ----------------------------<  178<H>  3.5   |  25  |  5.2<HH>    Ca    7.8<L>      15 Sep 2022 09:24  Mg     1.9     09-15    TPro  6.0  /  Alb  2.8<L>  /  TBili  <0.2  /  DBili  x   /  AST  73<H>  /  ALT  59<H>  /  AlkPhos  215<H>  09-15      Assessment and plan:  63 y/o M with PMHx of ESRD on HD MWF, CAD s/p stent and CABG, PVD, HTN, DM, HLD, TIA neuropathy was sent to the ED by podiatry for glass in the L foot.    #LLE cellulitis in setting of L foot foreign body  - No sepsis POA  - xray foot showing foreign body, no signs of OM  - Podiatry consulted  - ID consulted and Cardio consult  - c/w cefepime flagyl and vancomycin post HD  - Vanco level tomorrow AM   - ESR 75 and .9  - LE arterial duplex: mild stenosis in distal popliteal artery   - Today OR with podiatry     #CAD s/p stent and CABG  - follows with Dr. Simpson   - continue with home ASA/plavix, atorvastatin, lopressor, and lasix    #TIA  - continue with home ASA/plavix    #PVD  - Follow up arterial duplex   - plan for angioplasty on 9/22 with Dr. Malik    #mild transaminitis  - asymptomatic, monitor    #ESRD on HD MWF  - nephro consult  - Today HD scheduled     #HTN  #HLD  - monitor BP  - pend lipid panel  - continue with home meds    #DM with neuropathy  -Monitor blood glucose  -HbA1c: 3.1. At target   -goal glucose level: 140-180 mg/dl  -c/w lantus 25 units here (on levemir 40units at home)    #Misc:  #DVT PPX: heparin  #GI PPX: protonix  #Diet: renal restrictions  #Activity: WBAT with surgical shoe as per podiatry  #CODE: Full    Disposition: unknown at this time

## 2022-09-17 LAB
ALBUMIN SERPL ELPH-MCNC: 3.1 G/DL — LOW (ref 3.5–5.2)
ALP SERPL-CCNC: 190 U/L — HIGH (ref 30–115)
ALT FLD-CCNC: 42 U/L — HIGH (ref 0–41)
ANION GAP SERPL CALC-SCNC: 10 MMOL/L — SIGNIFICANT CHANGE UP (ref 7–14)
AST SERPL-CCNC: 33 U/L — SIGNIFICANT CHANGE UP (ref 0–41)
BASOPHILS # BLD AUTO: 0.05 K/UL — SIGNIFICANT CHANGE UP (ref 0–0.2)
BASOPHILS NFR BLD AUTO: 0.3 % — SIGNIFICANT CHANGE UP (ref 0–1)
BILIRUB SERPL-MCNC: 0.3 MG/DL — SIGNIFICANT CHANGE UP (ref 0.2–1.2)
BUN SERPL-MCNC: 34 MG/DL — HIGH (ref 10–20)
CALCIUM SERPL-MCNC: 8.3 MG/DL — LOW (ref 8.4–10.5)
CHLORIDE SERPL-SCNC: 96 MMOL/L — LOW (ref 98–110)
CO2 SERPL-SCNC: 30 MMOL/L — SIGNIFICANT CHANGE UP (ref 17–32)
CREAT SERPL-MCNC: 5.1 MG/DL — CRITICAL HIGH (ref 0.7–1.5)
EGFR: 12 ML/MIN/1.73M2 — LOW
EOSINOPHIL # BLD AUTO: 0.08 K/UL — SIGNIFICANT CHANGE UP (ref 0–0.7)
EOSINOPHIL NFR BLD AUTO: 0.5 % — SIGNIFICANT CHANGE UP (ref 0–8)
GLUCOSE BLDC GLUCOMTR-MCNC: 139 MG/DL — HIGH (ref 70–99)
GLUCOSE BLDC GLUCOMTR-MCNC: 180 MG/DL — HIGH (ref 70–99)
GLUCOSE BLDC GLUCOMTR-MCNC: 237 MG/DL — HIGH (ref 70–99)
GLUCOSE BLDC GLUCOMTR-MCNC: 92 MG/DL — SIGNIFICANT CHANGE UP (ref 70–99)
GLUCOSE SERPL-MCNC: 105 MG/DL — HIGH (ref 70–99)
HCT VFR BLD CALC: 30 % — LOW (ref 42–52)
HGB BLD-MCNC: 10 G/DL — LOW (ref 14–18)
IMM GRANULOCYTES NFR BLD AUTO: 0.8 % — HIGH (ref 0.1–0.3)
LYMPHOCYTES # BLD AUTO: 0.89 K/UL — LOW (ref 1.2–3.4)
LYMPHOCYTES # BLD AUTO: 5.6 % — LOW (ref 20.5–51.1)
MAGNESIUM SERPL-MCNC: 1.8 MG/DL — SIGNIFICANT CHANGE UP (ref 1.8–2.4)
MCHC RBC-ENTMCNC: 31.8 PG — HIGH (ref 27–31)
MCHC RBC-ENTMCNC: 33.3 G/DL — SIGNIFICANT CHANGE UP (ref 32–37)
MCV RBC AUTO: 95.5 FL — HIGH (ref 80–94)
MONOCYTES # BLD AUTO: 1.07 K/UL — HIGH (ref 0.1–0.6)
MONOCYTES NFR BLD AUTO: 6.8 % — SIGNIFICANT CHANGE UP (ref 1.7–9.3)
NEUTROPHILS # BLD AUTO: 13.63 K/UL — HIGH (ref 1.4–6.5)
NEUTROPHILS NFR BLD AUTO: 86 % — HIGH (ref 42.2–75.2)
NRBC # BLD: 0 /100 WBCS — SIGNIFICANT CHANGE UP (ref 0–0)
PHOSPHATE SERPL-MCNC: 2.7 MG/DL — SIGNIFICANT CHANGE UP (ref 2.1–4.9)
PLATELET # BLD AUTO: 204 K/UL — SIGNIFICANT CHANGE UP (ref 130–400)
POTASSIUM SERPL-MCNC: 4 MMOL/L — SIGNIFICANT CHANGE UP (ref 3.5–5)
POTASSIUM SERPL-SCNC: 4 MMOL/L — SIGNIFICANT CHANGE UP (ref 3.5–5)
PROT SERPL-MCNC: 6.3 G/DL — SIGNIFICANT CHANGE UP (ref 6–8)
RBC # BLD: 3.14 M/UL — LOW (ref 4.7–6.1)
RBC # FLD: 14.7 % — HIGH (ref 11.5–14.5)
SODIUM SERPL-SCNC: 136 MMOL/L — SIGNIFICANT CHANGE UP (ref 135–146)
VANCOMYCIN FLD-MCNC: 12.4 UG/ML — HIGH (ref 5–10)
WBC # BLD: 15.84 K/UL — HIGH (ref 4.8–10.8)
WBC # FLD AUTO: 15.84 K/UL — HIGH (ref 4.8–10.8)

## 2022-09-17 PROCEDURE — 99233 SBSQ HOSP IP/OBS HIGH 50: CPT

## 2022-09-17 RX ADMIN — HEPARIN SODIUM 5000 UNIT(S): 5000 INJECTION INTRAVENOUS; SUBCUTANEOUS at 21:24

## 2022-09-17 RX ADMIN — Medication 100 MILLIGRAM(S): at 13:03

## 2022-09-17 RX ADMIN — Medication 100 MILLIGRAM(S): at 06:17

## 2022-09-17 RX ADMIN — SEVELAMER CARBONATE 1600 MILLIGRAM(S): 2400 POWDER, FOR SUSPENSION ORAL at 08:02

## 2022-09-17 RX ADMIN — Medication 80 MILLIGRAM(S): at 06:16

## 2022-09-17 RX ADMIN — HEPARIN SODIUM 5000 UNIT(S): 5000 INJECTION INTRAVENOUS; SUBCUTANEOUS at 06:16

## 2022-09-17 RX ADMIN — Medication 50 MILLIGRAM(S): at 06:16

## 2022-09-17 RX ADMIN — INSULIN GLARGINE 25 UNIT(S): 100 INJECTION, SOLUTION SUBCUTANEOUS at 21:24

## 2022-09-17 RX ADMIN — HEPARIN SODIUM 5000 UNIT(S): 5000 INJECTION INTRAVENOUS; SUBCUTANEOUS at 13:03

## 2022-09-17 RX ADMIN — Medication 1: at 12:19

## 2022-09-17 RX ADMIN — Medication 81 MILLIGRAM(S): at 12:19

## 2022-09-17 RX ADMIN — PANTOPRAZOLE SODIUM 40 MILLIGRAM(S): 20 TABLET, DELAYED RELEASE ORAL at 06:16

## 2022-09-17 RX ADMIN — CEFEPIME 100 MILLIGRAM(S): 1 INJECTION, POWDER, FOR SOLUTION INTRAMUSCULAR; INTRAVENOUS at 15:30

## 2022-09-17 RX ADMIN — ATORVASTATIN CALCIUM 40 MILLIGRAM(S): 80 TABLET, FILM COATED ORAL at 21:24

## 2022-09-17 RX ADMIN — Medication 100 MILLIGRAM(S): at 21:25

## 2022-09-17 RX ADMIN — SEVELAMER CARBONATE 1600 MILLIGRAM(S): 2400 POWDER, FOR SUSPENSION ORAL at 12:19

## 2022-09-17 NOTE — PROGRESS NOTE ADULT - SUBJECTIVE AND OBJECTIVE BOX
Nephrology progress note    THIS IS AN INCOMPLETE NOTE . FULL NOTE TO FOLLOW SHORTLY    Patient is seen and examined, events over the last 24 h noted .    Allergies:  No Known Allergies    Hospital Medications:   MEDICATIONS  (STANDING):  aspirin  chewable 81 milliGRAM(s) Oral daily  atorvastatin 40 milliGRAM(s) Oral at bedtime  cefepime   IVPB 500 milliGRAM(s) IV Intermittent every 24 hours  dextrose 5%. 1000 milliLiter(s) (100 mL/Hr) IV Continuous <Continuous>  dextrose 5%. 1000 milliLiter(s) (50 mL/Hr) IV Continuous <Continuous>  dextrose 50% Injectable 25 Gram(s) IV Push once  dextrose 50% Injectable 12.5 Gram(s) IV Push once  dextrose 50% Injectable 25 Gram(s) IV Push once  furosemide    Tablet 80 milliGRAM(s) Oral daily  glucagon  Injectable 1 milliGRAM(s) IntraMuscular once  heparin   Injectable 5000 Unit(s) SubCutaneous every 8 hours  influenza   Vaccine 0.5 milliLiter(s) IntraMuscular once  insulin glargine Injectable (LANTUS) 25 Unit(s) SubCutaneous at bedtime  insulin lispro (ADMELOG) corrective regimen sliding scale   SubCutaneous three times a day before meals  metoprolol tartrate 50 milliGRAM(s) Oral two times a day  metroNIDAZOLE  IVPB 500 milliGRAM(s) IV Intermittent every 8 hours  pantoprazole    Tablet 40 milliGRAM(s) Oral before breakfast  sevelamer carbonate 1600 milliGRAM(s) Oral three times a day with meals        VITALS:  T(F): 97.8 (09-17-22 @ 05:00), Max: 98.5 (09-16-22 @ 14:03)  HR: 86 (09-17-22 @ 05:00)  BP: 158/74 (09-17-22 @ 05:00)  RR: 18 (09-17-22 @ 05:00)  SpO2: 98% (09-16-22 @ 21:37)  Wt(kg): --    09-16 @ 07:01  -  09-17 @ 07:00  --------------------------------------------------------  IN: 0 mL / OUT: 2500 mL / NET: -2500 mL      Height (cm): 180.3 (09-16 @ 12:15)  Weight (kg): 83.5 (09-16 @ 12:15)  BMI (kg/m2): 25.7 (09-16 @ 12:15)  BSA (m2): 2.04 (09-16 @ 12:15)    PHYSICAL EXAM:  Constitutional: NAD  HEENT: anicteric sclera, oropharynx clear, MMM  Neck: No JVD  Respiratory: CTAB, no wheezes, rales or rhonchi  Cardiovascular: S1, S2, RRR  Gastrointestinal: BS+, soft, NT/ND  Extremities: No cyanosis or clubbing. No peripheral edema  :  No schneider.   Skin: No rashes    LABS:  09-16    135  |  96<L>  |  49<H>  ----------------------------<  62<L>  4.0   |  23  |  6.3<HH>    Ca    7.8<L>      16 Sep 2022 10:07  Phos  3.2     09-16  Mg     1.9     09-15    TPro  6.1  /  Alb  2.9<L>  /  TBili  0.2  /  DBili      /  AST  46<H>  /  ALT  53<H>  /  AlkPhos  206<H>  09-16                          9.5    16.05 )-----------( 211      ( 16 Sep 2022 10:07 )             28.2       Urine Studies:        PTH -- (Ca 7.5)      [02-26-22 @ 16:00]   312  Vitamin D (25OH) 21      [02-26-22 @ 16:00]  HbA1c 5.8      [01-30-20 @ 06:46]  Lipid: chol 76, , HDL 29, LDL --      [09-15-22 @ 09:24]    HBsAb <3.0      [12-29-19 @ 17:44]  HBsAb Nonreact      [12-29-19 @ 17:44]  HBsAg Nonreact      [12-29-19 @ 17:44]  HBcAb Nonreact      [12-29-19 @ 17:44]  HCV 0.10, Nonreact      [12-29-19 @ 17:44]        RADIOLOGY & ADDITIONAL STUDIES:   Nephrology progress note  Patient is seen and examined, events over the last 24 h noted .  NO complaints     Allergies:  No Known Allergies    Hospital Medications:   MEDICATIONS  (STANDING):  aspirin  chewable 81 milliGRAM(s) Oral daily  atorvastatin 40 milliGRAM(s) Oral at bedtime  cefepime   IVPB 500 milliGRAM(s) IV Intermittent every 24 hours  furosemide    Tablet 80 milliGRAM(s) Oral daily  glucagon  Injectable 1 milliGRAM(s) IntraMuscular once  heparin   Injectable 5000 Unit(s) SubCutaneous every 8 hours  influenza   Vaccine 0.5 milliLiter(s) IntraMuscular once  insulin glargine Injectable (LANTUS) 25 Unit(s) SubCutaneous at bedtime  insulin lispro (ADMELOG) corrective regimen sliding scale   SubCutaneous three times a day before meals  metoprolol tartrate 50 milliGRAM(s) Oral two times a day  metroNIDAZOLE  IVPB 500 milliGRAM(s) IV Intermittent every 8 hours  pantoprazole    Tablet 40 milliGRAM(s) Oral before breakfast  sevelamer carbonate 1600 milliGRAM(s) Oral three times a day with meals        VITALS:  T(F): 97.8 (09-17-22 @ 05:00), Max: 98.5 (09-16-22 @ 14:03)  HR: 86 (09-17-22 @ 05:00)  BP: 158/74 (09-17-22 @ 05:00)  RR: 18 (09-17-22 @ 05:00)  SpO2: 98% (09-16-22 @ 21:37)  Wt(kg): --    09-16 @ 07:01  -  09-17 @ 07:00  --------------------------------------------------------  IN: 0 mL / OUT: 2500 mL / NET: -2500 mL      Height (cm): 180.3 (09-16 @ 12:15)  Weight (kg): 83.5 (09-16 @ 12:15)  BMI (kg/m2): 25.7 (09-16 @ 12:15)  BSA (m2): 2.04 (09-16 @ 12:15)    PHYSICAL EXAM:  Constitutional: NAD  Respiratory: CTAB,  Cardiovascular: S1, S2, RRR  Gastrointestinal: BS+, soft, NT/ND  Extremities: No cyanosis or clubbing. No peripheral edema/ feet in dressing   :  No schneider.   Skin: No rashes    LABS:  09-16    135  |  96<L>  |  49<H>  ----------------------------<  62<L>  4.0   |  23  |  6.3<HH>    Ca    7.8<L>      16 Sep 2022 10:07  Phos  3.2     09-16  Mg     1.9     09-15    TPro  6.1  /  Alb  2.9<L>  /  TBili  0.2  /  DBili      /  AST  46<H>  /  ALT  53<H>  /  AlkPhos  206<H>  09-16                          9.5    16.05 )-----------( 211      ( 16 Sep 2022 10:07 )             28.2       Urine Studies:        PTH -- (Ca 7.5)      [02-26-22 @ 16:00]   312  Vitamin D (25OH) 21      [02-26-22 @ 16:00]  HbA1c 5.8      [01-30-20 @ 06:46]  Lipid: chol 76, , HDL 29, LDL --      [09-15-22 @ 09:24]    HBsAb <3.0      [12-29-19 @ 17:44]  HBsAb Nonreact      [12-29-19 @ 17:44]  HBsAg Nonreact      [12-29-19 @ 17:44]  HBcAb Nonreact      [12-29-19 @ 17:44]  HCV 0.10, Nonreact      [12-29-19 @ 17:44]        RADIOLOGY & ADDITIONAL STUDIES:

## 2022-09-17 NOTE — PROGRESS NOTE ADULT - SUBJECTIVE AND OBJECTIVE BOX
SCHWABACHER, LAWRENCE  62y  Male      Patient is a 62y old  Male who presents with a chief complaint of LLE cellulitis and foreign body (17 Sep 2022 10:39)      INTERVAL HPI/OVERNIGHT EVENTS:  -pt seen this am   -s/p OR procedure/glass removal   -c/w current IV abx - ID follow up   -not discharge ready     REVIEW OF SYSTEMS:  -no new complaints   -doing well     Vital Signs Last 24 Hrs  T(C): 35.9 (17 Sep 2022 12:44), Max: 36.6 (17 Sep 2022 05:00)  T(F): 96.6 (17 Sep 2022 12:44), Max: 97.8 (17 Sep 2022 05:00)  HR: 90 (17 Sep 2022 12:44) (79 - 91)  BP: 148/68 (17 Sep 2022 12:44) (126/58 - 169/76)  BP(mean): --  RR: 18 (17 Sep 2022 12:44) (18 - 18)  SpO2: 98% (16 Sep 2022 21:37) (98% - 98%)    Parameters below as of 16 Sep 2022 21:37  Patient On (Oxygen Delivery Method): room air        PHYSICAL EXAM:  GENERAL: NAD, sitting up eating breakfast   HEAD:  Atraumatic, Normocephalic  EYES: EOMI, PERRLA, conjunctiva and sclera clear  NERVOUS SYSTEM:  Alert & Oriented X 4, Good concentration  CHEST/LUNG: Clear b/l  CV/HEART: Regular rate and rhythm; No murmurs, rubs, or gallops  GI/ABDOMEN: Soft and non tender   EXTREMITIES:  see skin   SKIN: b/l foot dressing in place     LAB:                        10.0   15.84 )-----------( 204      ( 17 Sep 2022 08:57 )             30.0     09-17    136  |  96<L>  |  34<H>  ----------------------------<  105<H>  4.0   |  30  |  5.1<HH>    Ca    8.3<L>      17 Sep 2022 08:57  Phos  2.7     09-17  Mg     1.8     09-17    TPro  6.3  /  Alb  3.1<L>  /  TBili  0.3  /  DBili  x   /  AST  33  /  ALT  42<H>  /  AlkPhos  190<H>  09-17    Daily     Daily   CAPILLARY BLOOD GLUCOSE    POCT Blood Glucose.: 180 mg/dL (17 Sep 2022 12:14)  POCT Blood Glucose.: 92 mg/dL (17 Sep 2022 07:47)  POCT Blood Glucose.: 253 mg/dL (16 Sep 2022 21:00)  POCT Blood Glucose.: 155 mg/dL (16 Sep 2022 16:35)    LIVER FUNCTIONS - ( 17 Sep 2022 08:57 )  Alb: 3.1 g/dL / Pro: 6.3 g/dL / ALK PHOS: 190 U/L / ALT: 42 U/L / AST: 33 U/L / GGT: x             RADIOLOGY:    Imaging Personally visualized and Reviewed:  [y ] YES  [ ] NO    HEALTH ISSUES - PROBLEM Dx:    meds:  aspirin  chewable 81 milliGRAM(s) Oral daily  atorvastatin 40 milliGRAM(s) Oral at bedtime  cefepime   IVPB 500 milliGRAM(s) IV Intermittent every 24 hours  dextrose 5%. 1000 milliLiter(s) IV Continuous <Continuous>  dextrose 5%. 1000 milliLiter(s) IV Continuous <Continuous>  dextrose 50% Injectable 25 Gram(s) IV Push once  dextrose 50% Injectable 12.5 Gram(s) IV Push once  dextrose 50% Injectable 25 Gram(s) IV Push once  dextrose Oral Gel 15 Gram(s) Oral once PRN  dextrose Oral Gel 15 Gram(s) Oral once PRN  furosemide    Tablet 80 milliGRAM(s) Oral daily  glucagon  Injectable 1 milliGRAM(s) IntraMuscular once  heparin   Injectable 5000 Unit(s) SubCutaneous every 8 hours  influenza   Vaccine 0.5 milliLiter(s) IntraMuscular once  insulin glargine Injectable (LANTUS) 25 Unit(s) SubCutaneous at bedtime  insulin lispro (ADMELOG) corrective regimen sliding scale   SubCutaneous three times a day before meals  metoprolol tartrate 50 milliGRAM(s) Oral two times a day  metroNIDAZOLE  IVPB 500 milliGRAM(s) IV Intermittent every 8 hours  pantoprazole    Tablet 40 milliGRAM(s) Oral before breakfast  sevelamer carbonate 1600 milliGRAM(s) Oral three times a day with meals

## 2022-09-17 NOTE — PROGRESS NOTE ADULT - SUBJECTIVE AND OBJECTIVE BOX
Podiatry Progress Note    Subjective:  SCHWABACHER, LAWRENCE is a  62y Male.   Seen bedside.   Patient is a 62y old  Male who presents with a chief complaint of LLE cellulitis and foreign body (17 Sep 2022 07:15)      Past Medical History and Surgical History  PAST MEDICAL & SURGICAL HISTORY:  CAD (coronary artery disease)      S/P CABG (coronary artery bypass graft)  x4      Diabetes mellitus      Transient ischemic attack (TIA)  2017; 2008      Chronic kidney disease (CKD)  Stage IV      Hypertension      Stented coronary artery  in 2008      Myocardial infarction  2012      PAD (peripheral artery disease)  S/p bypass left leg      HLD (hyperlipidemia)      BPH (benign prostatic hyperplasia)      Pain in left knee  s/p fall      OA (osteoarthritis)      [place] (hard of hearing)      Chronic anemia      S/P CABG (coronary artery bypass graft)  2012      H/O arterial bypass of lower limb  Left Lower Extremity (2016)      History of surgery  Left CEA (2017)  Left Pinkie toe Amputation (2014)  CABG x 4 (2012)  Card cath - stent (2008)        AV fistula  2019  LEFT AV FISTULA           Objective:  Vital Signs Last 24 Hrs  T(C): 36.6 (17 Sep 2022 05:00), Max: 36.9 (16 Sep 2022 14:03)  T(F): 97.8 (17 Sep 2022 05:00), Max: 98.5 (16 Sep 2022 14:03)  HR: 86 (17 Sep 2022 05:00) (79 - 91)  BP: 158/74 (17 Sep 2022 05:00) (126/58 - 178/81)  BP(mean): --  RR: 18 (17 Sep 2022 05:00) (18 - 18)  SpO2: 98% (16 Sep 2022 21:37) (95% - 100%)    Parameters below as of 16 Sep 2022 21:37  Patient On (Oxygen Delivery Method): room air                            10.0   15.84 )-----------( 204      ( 17 Sep 2022 08:57 )             30.0                 09-17    136  |  96<L>  |  34<H>  ----------------------------<  105<H>  4.0   |  30  |  5.1<HH>    Ca    8.3<L>      17 Sep 2022 08:57  Phos  2.7     09-17  Mg     1.8     09-17    TPro  6.3  /  Alb  3.1<L>  /  TBili  0.3  /  DBili  x   /  AST  33  /  ALT  42<H>  /  AlkPhos  190<H>  09-17        Physical Exam - Lower Extremity Focused:   Derm: Multiple open wounds left heel , probe to deep tissue, no purulent drainage, serous drainage erythema improved.   Vascular: DP and PT Pulses Diminished; Foot is Warm to Warm to the touch; Capillary Refill Time < 3 Seconds;    Neuro: Protective Sensation Diminished / Moderately Neuropathic   MSK: Pain On Palpation at Wound Site     Assessment:     Foreign body (glass) left foot  Cellulitic Left Lower Extremity      s/p removal of FB 9/16    Plan:  Chart reviewed and Patient evaluated. All Questions and Concerns Addressed and Answered  Local Wound Care; Wound Flushed w/ NS; Wound Packed w/ Betadine Soaked Gauze / DSD / Kerlix   Weight Bearing Status; WBAT w/ Heel Touch w/ Surgical Shoe;   Continue w/ Local Wound Care; Q24 Dressing Changes;  CW IV abx  No Further Sx Debridement at this time.   Pt to follow up with Dr. Pacheco as out patient once cleared by medicine..   Discussed Plan w/ Attending;     Podiatry

## 2022-09-18 LAB
ALBUMIN SERPL ELPH-MCNC: 3.1 G/DL — LOW (ref 3.5–5.2)
ALP SERPL-CCNC: 192 U/L — HIGH (ref 30–115)
ALT FLD-CCNC: 37 U/L — SIGNIFICANT CHANGE UP (ref 0–41)
ANION GAP SERPL CALC-SCNC: 15 MMOL/L — HIGH (ref 7–14)
AST SERPL-CCNC: 32 U/L — SIGNIFICANT CHANGE UP (ref 0–41)
BASOPHILS # BLD AUTO: 0.06 K/UL — SIGNIFICANT CHANGE UP (ref 0–0.2)
BASOPHILS NFR BLD AUTO: 0.4 % — SIGNIFICANT CHANGE UP (ref 0–1)
BILIRUB SERPL-MCNC: 0.2 MG/DL — SIGNIFICANT CHANGE UP (ref 0.2–1.2)
BUN SERPL-MCNC: 47 MG/DL — HIGH (ref 10–20)
CALCIUM SERPL-MCNC: 8 MG/DL — LOW (ref 8.4–10.5)
CHLORIDE SERPL-SCNC: 96 MMOL/L — LOW (ref 98–110)
CO2 SERPL-SCNC: 23 MMOL/L — SIGNIFICANT CHANGE UP (ref 17–32)
CREAT SERPL-MCNC: 5.9 MG/DL — CRITICAL HIGH (ref 0.7–1.5)
EGFR: 10 ML/MIN/1.73M2 — LOW
EOSINOPHIL # BLD AUTO: 0.13 K/UL — SIGNIFICANT CHANGE UP (ref 0–0.7)
EOSINOPHIL NFR BLD AUTO: 0.8 % — SIGNIFICANT CHANGE UP (ref 0–8)
GLUCOSE BLDC GLUCOMTR-MCNC: 102 MG/DL — HIGH (ref 70–99)
GLUCOSE BLDC GLUCOMTR-MCNC: 155 MG/DL — HIGH (ref 70–99)
GLUCOSE BLDC GLUCOMTR-MCNC: 199 MG/DL — HIGH (ref 70–99)
GLUCOSE BLDC GLUCOMTR-MCNC: 258 MG/DL — HIGH (ref 70–99)
GLUCOSE SERPL-MCNC: 104 MG/DL — HIGH (ref 70–99)
HCT VFR BLD CALC: 27.1 % — LOW (ref 42–52)
HGB BLD-MCNC: 9.3 G/DL — LOW (ref 14–18)
IMM GRANULOCYTES NFR BLD AUTO: 0.9 % — HIGH (ref 0.1–0.3)
LYMPHOCYTES # BLD AUTO: 0.93 K/UL — LOW (ref 1.2–3.4)
LYMPHOCYTES # BLD AUTO: 5.7 % — LOW (ref 20.5–51.1)
MAGNESIUM SERPL-MCNC: 1.8 MG/DL — SIGNIFICANT CHANGE UP (ref 1.8–2.4)
MCHC RBC-ENTMCNC: 32.7 PG — HIGH (ref 27–31)
MCHC RBC-ENTMCNC: 34.3 G/DL — SIGNIFICANT CHANGE UP (ref 32–37)
MCV RBC AUTO: 95.4 FL — HIGH (ref 80–94)
MONOCYTES # BLD AUTO: 0.87 K/UL — HIGH (ref 0.1–0.6)
MONOCYTES NFR BLD AUTO: 5.4 % — SIGNIFICANT CHANGE UP (ref 1.7–9.3)
NEUTROPHILS # BLD AUTO: 14.12 K/UL — HIGH (ref 1.4–6.5)
NEUTROPHILS NFR BLD AUTO: 86.8 % — HIGH (ref 42.2–75.2)
NRBC # BLD: 0 /100 WBCS — SIGNIFICANT CHANGE UP (ref 0–0)
PHOSPHATE SERPL-MCNC: 2.6 MG/DL — SIGNIFICANT CHANGE UP (ref 2.1–4.9)
PLATELET # BLD AUTO: 212 K/UL — SIGNIFICANT CHANGE UP (ref 130–400)
POTASSIUM SERPL-MCNC: 4.5 MMOL/L — SIGNIFICANT CHANGE UP (ref 3.5–5)
POTASSIUM SERPL-SCNC: 4.5 MMOL/L — SIGNIFICANT CHANGE UP (ref 3.5–5)
PROT SERPL-MCNC: 6.3 G/DL — SIGNIFICANT CHANGE UP (ref 6–8)
RBC # BLD: 2.84 M/UL — LOW (ref 4.7–6.1)
RBC # FLD: 14.6 % — HIGH (ref 11.5–14.5)
SODIUM SERPL-SCNC: 134 MMOL/L — LOW (ref 135–146)
WBC # BLD: 16.25 K/UL — HIGH (ref 4.8–10.8)
WBC # FLD AUTO: 16.25 K/UL — HIGH (ref 4.8–10.8)

## 2022-09-18 PROCEDURE — 99233 SBSQ HOSP IP/OBS HIGH 50: CPT

## 2022-09-18 RX ADMIN — SEVELAMER CARBONATE 1600 MILLIGRAM(S): 2400 POWDER, FOR SUSPENSION ORAL at 12:19

## 2022-09-18 RX ADMIN — CEFEPIME 100 MILLIGRAM(S): 1 INJECTION, POWDER, FOR SOLUTION INTRAMUSCULAR; INTRAVENOUS at 15:33

## 2022-09-18 RX ADMIN — Medication 100 MILLIGRAM(S): at 22:20

## 2022-09-18 RX ADMIN — Medication 50 MILLIGRAM(S): at 17:22

## 2022-09-18 RX ADMIN — HEPARIN SODIUM 5000 UNIT(S): 5000 INJECTION INTRAVENOUS; SUBCUTANEOUS at 05:50

## 2022-09-18 RX ADMIN — SEVELAMER CARBONATE 1600 MILLIGRAM(S): 2400 POWDER, FOR SUSPENSION ORAL at 17:22

## 2022-09-18 RX ADMIN — Medication 100 MILLIGRAM(S): at 13:04

## 2022-09-18 RX ADMIN — Medication 100 MILLIGRAM(S): at 05:51

## 2022-09-18 RX ADMIN — SEVELAMER CARBONATE 1600 MILLIGRAM(S): 2400 POWDER, FOR SUSPENSION ORAL at 07:54

## 2022-09-18 RX ADMIN — Medication 3: at 16:45

## 2022-09-18 RX ADMIN — Medication 80 MILLIGRAM(S): at 05:50

## 2022-09-18 RX ADMIN — HEPARIN SODIUM 5000 UNIT(S): 5000 INJECTION INTRAVENOUS; SUBCUTANEOUS at 22:20

## 2022-09-18 RX ADMIN — Medication 50 MILLIGRAM(S): at 05:50

## 2022-09-18 RX ADMIN — Medication 1: at 12:18

## 2022-09-18 RX ADMIN — HEPARIN SODIUM 5000 UNIT(S): 5000 INJECTION INTRAVENOUS; SUBCUTANEOUS at 13:05

## 2022-09-18 RX ADMIN — Medication 81 MILLIGRAM(S): at 12:20

## 2022-09-18 RX ADMIN — INSULIN GLARGINE 25 UNIT(S): 100 INJECTION, SOLUTION SUBCUTANEOUS at 22:20

## 2022-09-18 RX ADMIN — ATORVASTATIN CALCIUM 40 MILLIGRAM(S): 80 TABLET, FILM COATED ORAL at 22:20

## 2022-09-18 RX ADMIN — PANTOPRAZOLE SODIUM 40 MILLIGRAM(S): 20 TABLET, DELAYED RELEASE ORAL at 05:49

## 2022-09-18 NOTE — PROGRESS NOTE ADULT - SUBJECTIVE AND OBJECTIVE BOX
SCHWABACHER, LAWRENCE  62y  Male      Patient is a 62y old  Male who presents with a chief complaint of LLE cellulitis and foreign body (17 Sep 2022 10:39)      INTERVAL HPI/OVERNIGHT EVENTS:  -pt seen this am   -s/p OR procedure/glass removal Friday   -c/w current IV abx - ID follow up   -dc planning once cleared by ID     REVIEW OF SYSTEMS:  -no new complaints   -doing well     Vital Signs Last 24 Hrs  T(C): 36.3 (18 Sep 2022 12:36), Max: 36.8 (18 Sep 2022 05:14)  T(F): 97.4 (18 Sep 2022 12:36), Max: 98.2 (18 Sep 2022 05:14)  HR: 88 (18 Sep 2022 12:36) (78 - 88)  BP: 160/73 (18 Sep 2022 12:36) (143/96 - 178/77)  BP(mean): --  RR: 18 (18 Sep 2022 12:36) (18 - 18)  SpO2: 99% (17 Sep 2022 20:24) (99% - 99%)    Parameters below as of 17 Sep 2022 20:24  Patient On (Oxygen Delivery Method): room air      PHYSICAL EXAM:  GENERAL: NAD, sitting up eating breakfast   HEAD:  Atraumatic, Normocephalic  EYES: EOMI, PERRLA, conjunctiva and sclera clear  NERVOUS SYSTEM:  Alert & Oriented X 4, Good concentration  CHEST/LUNG: Clear b/l  CV/HEART: Regular rate and rhythm; No murmurs, rubs, or gallops  GI/ABDOMEN: Soft and non tender   EXTREMITIES:  see skin   SKIN: b/l foot dressing in place     LAB:                                 9.3    16.25 )-----------( 212      ( 18 Sep 2022 09:18 )             27.1   09-18    134<L>  |  96<L>  |  47<H>  ----------------------------<  104<H>  4.5   |  23  |  5.9<HH>    Ca    8.0<L>      18 Sep 2022 09:18  Phos  2.6     09-18  Mg     1.8     09-18    TPro  6.3  /  Alb  3.1<L>  /  TBili  0.2  /  DBili  x   /  AST  32  /  ALT  37  /  AlkPhos  192<H>  09-18    Daily     Daily   CAPILLARY BLOOD GLUCOSE    POCT Blood Glucose.: 180 mg/dL (17 Sep 2022 12:14)  POCT Blood Glucose.: 92 mg/dL (17 Sep 2022 07:47)  POCT Blood Glucose.: 253 mg/dL (16 Sep 2022 21:00)  POCT Blood Glucose.: 155 mg/dL (16 Sep 2022 16:35)    LIVER FUNCTIONS - ( 17 Sep 2022 08:57 )  Alb: 3.1 g/dL / Pro: 6.3 g/dL / ALK PHOS: 190 U/L / ALT: 42 U/L / AST: 33 U/L / GGT: x             RADIOLOGY:    Imaging Personally visualized and Reviewed:  [y ] YES  [ ] NO    HEALTH ISSUES - PROBLEM Dx:    MEDICATIONS  (STANDING):  aspirin  chewable 81 milliGRAM(s) Oral daily  atorvastatin 40 milliGRAM(s) Oral at bedtime  cefepime   IVPB 500 milliGRAM(s) IV Intermittent every 24 hours  dextrose 5%. 1000 milliLiter(s) (100 mL/Hr) IV Continuous <Continuous>  dextrose 5%. 1000 milliLiter(s) (50 mL/Hr) IV Continuous <Continuous>  dextrose 50% Injectable 25 Gram(s) IV Push once  dextrose 50% Injectable 12.5 Gram(s) IV Push once  dextrose 50% Injectable 25 Gram(s) IV Push once  furosemide    Tablet 80 milliGRAM(s) Oral daily  glucagon  Injectable 1 milliGRAM(s) IntraMuscular once  heparin   Injectable 5000 Unit(s) SubCutaneous every 8 hours  influenza   Vaccine 0.5 milliLiter(s) IntraMuscular once  insulin glargine Injectable (LANTUS) 25 Unit(s) SubCutaneous at bedtime  insulin lispro (ADMELOG) corrective regimen sliding scale   SubCutaneous three times a day before meals  metoprolol tartrate 50 milliGRAM(s) Oral two times a day  metroNIDAZOLE  IVPB 500 milliGRAM(s) IV Intermittent every 8 hours  pantoprazole    Tablet 40 milliGRAM(s) Oral before breakfast  sevelamer carbonate 1600 milliGRAM(s) Oral three times a day with meals    MEDICATIONS  (PRN):  dextrose Oral Gel 15 Gram(s) Oral once PRN Blood Glucose LESS THAN 70 milliGRAM(s)/deciliter  dextrose Oral Gel 15 Gram(s) Oral once PRN Blood Glucose LESS THAN 70 milliGRAM(s)/deciliter

## 2022-09-18 NOTE — PROGRESS NOTE ADULT - SUBJECTIVE AND OBJECTIVE BOX
Podiatry Progress Note    Subjective:  SCHWABACHER, LAWRENCE is a  62y Male.   Seen bedside.   Patient is a 62y old  Male who presents with a chief complaint of LLE cellulitis and foreign body (17 Sep 2022 15:11)      Past Medical History and Surgical History  PAST MEDICAL & SURGICAL HISTORY:  CAD (coronary artery disease)      S/P CABG (coronary artery bypass graft)  x4      Diabetes mellitus      Transient ischemic attack (TIA)  2017; 2008      Chronic kidney disease (CKD)  Stage IV      Hypertension      Stented coronary artery  in 2008      Myocardial infarction  2012      PAD (peripheral artery disease)  S/p bypass left leg      HLD (hyperlipidemia)      BPH (benign prostatic hyperplasia)      Pain in left knee  s/p fall      OA (osteoarthritis)      [place]      Chronic anemia      S/P CABG (coronary artery bypass graft)  2012      H/O arterial bypass of lower limb  Left Lower Extremity (2016)      History of surgery  Left CEA (2017)  Left Pinkie toe Amputation (2014)  CABG x 4 (2012)  Card cath - stent (2008)        AV fistula  2019  LEFT AV FISTULA           Objective:  Vital Signs Last 24 Hrs  T(C): 36.8 (18 Sep 2022 05:14), Max: 36.8 (18 Sep 2022 05:14)  T(F): 98.2 (18 Sep 2022 05:14), Max: 98.2 (18 Sep 2022 05:14)  HR: 85 (18 Sep 2022 05:14) (78 - 90)  BP: 143/96 (18 Sep 2022 07:59) (143/96 - 178/77)  BP(mean): --  RR: 18 (18 Sep 2022 05:14) (18 - 18)  SpO2: 99% (17 Sep 2022 20:24) (99% - 99%)    Parameters below as of 17 Sep 2022 20:24  Patient On (Oxygen Delivery Method): room air                            9.3    16.25 )-----------( 212      ( 18 Sep 2022 09:18 )             27.1                 09-18    134<L>  |  96<L>  |  47<H>  ----------------------------<  104<H>  4.5   |  23  |  5.9<HH>    Ca    8.0<L>      18 Sep 2022 09:18  Phos  2.6     09-18  Mg     1.8     09-18    TPro  6.3  /  Alb  3.1<L>  /  TBili  0.2  /  DBili  x   /  AST  32  /  ALT  37  /  AlkPhos  192<H>  09-18         Physical Exam - Lower Extremity Focused:   Derm: Multiple open wounds left heel , probe to deep tissue, 3cc purulent drainage, serous drainage erythema improved.   Vascular: DP and PT Pulses Diminished; Foot is Warm to Warm to the touch; Capillary Refill Time < 3 Seconds;    Neuro: Protective Sensation Diminished / Moderately Neuropathic   MSK: Pain On Palpation at Wound Site     Assessment:     Foreign body (glass) left foot  Cellulitic Left Lower Extremity      s/p removal of FB 9/16    Plan:  Chart reviewed and Patient evaluated. All Questions and Concerns Addressed and Answered  Local Wound Care; Wound Flushed w/ NS; Wound Packed w/ Betadine Soaked Gauze / DSD / Kerlix   3cc purulent drainage from medial ankle site, WCx taken sent to micro.   Weight Bearing Status; WBAT w/ Heel Touch w/ Surgical Shoe;   Continue w/ Local Wound Care; Q24 Dressing Changes;  CW IV abx  No Further Sx Debridement at this time.   Pt to follow up with Dr. Pacheco as out patient once cleared by medicine..   Discussed Plan w/ Attending;     Podiatry

## 2022-09-18 NOTE — PROGRESS NOTE ADULT - SUBJECTIVE AND OBJECTIVE BOX
Nephrology progress note    Patient was seen and examined, events over the last 24 h noted .    HD fri    Allergies:  No Known Allergies    Hospital Medications:   MEDICATIONS  (STANDING):  aspirin  chewable 81 milliGRAM(s) Oral daily  atorvastatin 40 milliGRAM(s) Oral at bedtime  cefepime   IVPB 500 milliGRAM(s) IV Intermittent every 24 hours  dextrose 5%. 1000 milliLiter(s) (100 mL/Hr) IV Continuous <Continuous>  dextrose 5%. 1000 milliLiter(s) (50 mL/Hr) IV Continuous <Continuous>  dextrose 50% Injectable 25 Gram(s) IV Push once  dextrose 50% Injectable 12.5 Gram(s) IV Push once  dextrose 50% Injectable 25 Gram(s) IV Push once  furosemide    Tablet 80 milliGRAM(s) Oral daily  glucagon  Injectable 1 milliGRAM(s) IntraMuscular once  heparin   Injectable 5000 Unit(s) SubCutaneous every 8 hours  influenza   Vaccine 0.5 milliLiter(s) IntraMuscular once  insulin glargine Injectable (LANTUS) 25 Unit(s) SubCutaneous at bedtime  insulin lispro (ADMELOG) corrective regimen sliding scale   SubCutaneous three times a day before meals  metoprolol tartrate 50 milliGRAM(s) Oral two times a day  metroNIDAZOLE  IVPB 500 milliGRAM(s) IV Intermittent every 8 hours  pantoprazole    Tablet 40 milliGRAM(s) Oral before breakfast  sevelamer carbonate 1600 milliGRAM(s) Oral three times a day with meals        VITALS:  T(F): 97.4 (09-18-22 @ 12:36), Max: 98.2 (09-18-22 @ 05:14)  HR: 88 (09-18-22 @ 12:36)  BP: 160/73 (09-18-22 @ 12:36)  RR: 18 (09-18-22 @ 12:36)  SpO2: 99% (09-17-22 @ 20:24)  Wt(kg): --    09-16 @ 07:01  -  09-17 @ 07:00  --------------------------------------------------------  IN: 0 mL / OUT: 2500 mL / NET: -2500 mL    09-17 @ 07:01 - 09-18 @ 07:00  --------------------------------------------------------  IN: 0 mL / OUT: 1 mL / NET: -1 mL          PHYSICAL EXAM:  Constitutional: NAD  HEENT: anicteric sclera, oropharynx clear, MMM  Neck: No JVD  Respiratory: CTAB, no wheezes, rales or rhonchi  Cardiovascular: S1, S2, RRR  Gastrointestinal: BS+, soft, NT/ND  Extremities: No cyanosis or clubbing. No peripheral edema  :  No schneider.   Skin: No rashes    LABS:  09-18    134<L>  |  96<L>  |  47<H>  ----------------------------<  104<H>  4.5   |  23  |  5.9<HH>    Ca    8.0<L>      18 Sep 2022 09:18  Phos  2.6     09-18  Mg     1.8     09-18    TPro  6.3  /  Alb  3.1<L>  /  TBili  0.2  /  DBili      /  AST  32  /  ALT  37  /  AlkPhos  192<H>  09-18                          9.3    16.25 )-----------( 212      ( 18 Sep 2022 09:18 )             27.1       Urine Studies:      RADIOLOGY & ADDITIONAL STUDIES:

## 2022-09-19 LAB
ALBUMIN SERPL ELPH-MCNC: 2.8 G/DL — LOW (ref 3.5–5.2)
ALP SERPL-CCNC: 165 U/L — HIGH (ref 30–115)
ALT FLD-CCNC: 28 U/L — SIGNIFICANT CHANGE UP (ref 0–41)
ANION GAP SERPL CALC-SCNC: 15 MMOL/L — HIGH (ref 7–14)
AST SERPL-CCNC: 23 U/L — SIGNIFICANT CHANGE UP (ref 0–41)
BASOPHILS # BLD AUTO: 0.06 K/UL — SIGNIFICANT CHANGE UP (ref 0–0.2)
BASOPHILS NFR BLD AUTO: 0.4 % — SIGNIFICANT CHANGE UP (ref 0–1)
BILIRUB SERPL-MCNC: 0.3 MG/DL — SIGNIFICANT CHANGE UP (ref 0.2–1.2)
BUN SERPL-MCNC: 60 MG/DL — HIGH (ref 10–20)
CALCIUM SERPL-MCNC: 8.2 MG/DL — LOW (ref 8.4–10.5)
CHLORIDE SERPL-SCNC: 96 MMOL/L — LOW (ref 98–110)
CO2 SERPL-SCNC: 23 MMOL/L — SIGNIFICANT CHANGE UP (ref 17–32)
CREAT SERPL-MCNC: 7 MG/DL — CRITICAL HIGH (ref 0.7–1.5)
EGFR: 8 ML/MIN/1.73M2 — LOW
EOSINOPHIL # BLD AUTO: 0.13 K/UL — SIGNIFICANT CHANGE UP (ref 0–0.7)
EOSINOPHIL NFR BLD AUTO: 0.9 % — SIGNIFICANT CHANGE UP (ref 0–8)
GLUCOSE BLDC GLUCOMTR-MCNC: 199 MG/DL — HIGH (ref 70–99)
GLUCOSE BLDC GLUCOMTR-MCNC: 214 MG/DL — HIGH (ref 70–99)
GLUCOSE BLDC GLUCOMTR-MCNC: 223 MG/DL — HIGH (ref 70–99)
GLUCOSE BLDC GLUCOMTR-MCNC: 82 MG/DL — SIGNIFICANT CHANGE UP (ref 70–99)
GLUCOSE SERPL-MCNC: 171 MG/DL — HIGH (ref 70–99)
HCT VFR BLD CALC: 26.4 % — LOW (ref 42–52)
HGB BLD-MCNC: 8.8 G/DL — LOW (ref 14–18)
IMM GRANULOCYTES NFR BLD AUTO: 0.7 % — HIGH (ref 0.1–0.3)
LYMPHOCYTES # BLD AUTO: 0.77 K/UL — LOW (ref 1.2–3.4)
LYMPHOCYTES # BLD AUTO: 5.6 % — LOW (ref 20.5–51.1)
MAGNESIUM SERPL-MCNC: 1.8 MG/DL — SIGNIFICANT CHANGE UP (ref 1.8–2.4)
MCHC RBC-ENTMCNC: 31.8 PG — HIGH (ref 27–31)
MCHC RBC-ENTMCNC: 33.3 G/DL — SIGNIFICANT CHANGE UP (ref 32–37)
MCV RBC AUTO: 95.3 FL — HIGH (ref 80–94)
MONOCYTES # BLD AUTO: 0.76 K/UL — HIGH (ref 0.1–0.6)
MONOCYTES NFR BLD AUTO: 5.5 % — SIGNIFICANT CHANGE UP (ref 1.7–9.3)
NEUTROPHILS # BLD AUTO: 11.93 K/UL — HIGH (ref 1.4–6.5)
NEUTROPHILS NFR BLD AUTO: 86.9 % — HIGH (ref 42.2–75.2)
NRBC # BLD: 0 /100 WBCS — SIGNIFICANT CHANGE UP (ref 0–0)
PHOSPHATE SERPL-MCNC: 3.1 MG/DL — SIGNIFICANT CHANGE UP (ref 2.1–4.9)
PLATELET # BLD AUTO: 197 K/UL — SIGNIFICANT CHANGE UP (ref 130–400)
POTASSIUM SERPL-MCNC: 4.3 MMOL/L — SIGNIFICANT CHANGE UP (ref 3.5–5)
POTASSIUM SERPL-SCNC: 4.3 MMOL/L — SIGNIFICANT CHANGE UP (ref 3.5–5)
PROT SERPL-MCNC: 6.3 G/DL — SIGNIFICANT CHANGE UP (ref 6–8)
RBC # BLD: 2.77 M/UL — LOW (ref 4.7–6.1)
RBC # FLD: 14.5 % — SIGNIFICANT CHANGE UP (ref 11.5–14.5)
SODIUM SERPL-SCNC: 134 MMOL/L — LOW (ref 135–146)
WBC # BLD: 13.75 K/UL — HIGH (ref 4.8–10.8)
WBC # FLD AUTO: 13.75 K/UL — HIGH (ref 4.8–10.8)

## 2022-09-19 PROCEDURE — 36000 PLACE NEEDLE IN VEIN: CPT

## 2022-09-19 PROCEDURE — 76937 US GUIDE VASCULAR ACCESS: CPT | Mod: 26

## 2022-09-19 PROCEDURE — 99233 SBSQ HOSP IP/OBS HIGH 50: CPT

## 2022-09-19 RX ADMIN — Medication 2: at 18:16

## 2022-09-19 RX ADMIN — HEPARIN SODIUM 5000 UNIT(S): 5000 INJECTION INTRAVENOUS; SUBCUTANEOUS at 21:43

## 2022-09-19 RX ADMIN — Medication 100 MILLIGRAM(S): at 06:01

## 2022-09-19 RX ADMIN — PANTOPRAZOLE SODIUM 40 MILLIGRAM(S): 20 TABLET, DELAYED RELEASE ORAL at 06:00

## 2022-09-19 RX ADMIN — SEVELAMER CARBONATE 1600 MILLIGRAM(S): 2400 POWDER, FOR SUSPENSION ORAL at 12:46

## 2022-09-19 RX ADMIN — INSULIN GLARGINE 25 UNIT(S): 100 INJECTION, SOLUTION SUBCUTANEOUS at 21:43

## 2022-09-19 RX ADMIN — CEFEPIME 100 MILLIGRAM(S): 1 INJECTION, POWDER, FOR SOLUTION INTRAMUSCULAR; INTRAVENOUS at 18:16

## 2022-09-19 RX ADMIN — Medication 80 MILLIGRAM(S): at 06:00

## 2022-09-19 RX ADMIN — SEVELAMER CARBONATE 1600 MILLIGRAM(S): 2400 POWDER, FOR SUSPENSION ORAL at 18:16

## 2022-09-19 RX ADMIN — ATORVASTATIN CALCIUM 40 MILLIGRAM(S): 80 TABLET, FILM COATED ORAL at 21:43

## 2022-09-19 RX ADMIN — SEVELAMER CARBONATE 1600 MILLIGRAM(S): 2400 POWDER, FOR SUSPENSION ORAL at 09:05

## 2022-09-19 RX ADMIN — Medication 81 MILLIGRAM(S): at 12:47

## 2022-09-19 RX ADMIN — Medication 1: at 12:45

## 2022-09-19 RX ADMIN — Medication 100 MILLIGRAM(S): at 21:44

## 2022-09-19 RX ADMIN — Medication 50 MILLIGRAM(S): at 06:00

## 2022-09-19 RX ADMIN — HEPARIN SODIUM 5000 UNIT(S): 5000 INJECTION INTRAVENOUS; SUBCUTANEOUS at 06:00

## 2022-09-19 RX ADMIN — Medication 50 MILLIGRAM(S): at 18:16

## 2022-09-19 NOTE — PROGRESS NOTE ADULT - SUBJECTIVE AND OBJECTIVE BOX
SCHWABACHER, LAWRENCE  62y, Male  Allergy: No Known Allergies      CHIEF COMPLAINT: LLE cellulitis and foreign body (19 Sep 2022 10:26)      INTERVAL EVENTS/HPI  - No acute events overnight  - T(F): , Max: 98.5 (09-18-22 @ 21:18)  - Denies any worsening symptoms  - Tolerating medication  -     ROS  General: Denies fevers, chills, nightsweats, weight loss  HEENT: Denies headache, rhinorrhea, sore throat, eye pain  CV: Denies CP, palpitations  PULM: Denies SOB, cough  GI: Denies abdominal pain, diarrhea  : Denies dysuria, hematuria  MSK: Denies arthralgias  SKIN: Denies rash   NEURO: Denies paresthesias, weakness  PSYCH: Denies depression    FH non-contributory   Social Hx non-contributory    VITALS:  T(F): 98.2, Max: 98.5 (09-18-22 @ 21:18)  HR: 82  BP: 171/71  RR: 18Vital Signs Last 24 Hrs  T(C): 36.8 (19 Sep 2022 05:00), Max: 36.9 (18 Sep 2022 21:18)  T(F): 98.2 (19 Sep 2022 05:00), Max: 98.5 (18 Sep 2022 21:18)  HR: 82 (19 Sep 2022 05:00) (82 - 88)  BP: 171/71 (19 Sep 2022 05:00) (147/65 - 171/71)  BP(mean): 102 (19 Sep 2022 05:00) (102 - 102)  RR: 18 (19 Sep 2022 05:00) (18 - 18)  SpO2: 98% (18 Sep 2022 21:06) (98% - 98%)    Parameters below as of 18 Sep 2022 21:06  Patient On (Oxygen Delivery Method): room air        PHYSICAL EXAM:  Gen: NAD, resting in bed  HEENT: Normocephalic, atraumatic  Neck: supple, no lymphadenopathy  CV: Regular rate & regular rhythm  Lungs: decreased BS at bases, no fremitus  Abdomen: Soft, BS present  Ext: Warm, well perfused  Neuro: non focal, awake  Skin: no rash, no erythema      TESTS & MEASUREMENTS:                        9.3    16.25 )-----------( 212      ( 18 Sep 2022 09:18 )             27.1     09-18    134<L>  |  96<L>  |  47<H>  ----------------------------<  104<H>  4.5   |  23  |  5.9<HH>    Ca    8.0<L>      18 Sep 2022 09:18  Phos  2.6     09-18  Mg     1.8     09-18    TPro  6.3  /  Alb  3.1<L>  /  TBili  0.2  /  DBili  x   /  AST  32  /  ALT  37  /  AlkPhos  192<H>  09-18      LIVER FUNCTIONS - ( 18 Sep 2022 09:18 )  Alb: 3.1 g/dL / Pro: 6.3 g/dL / ALK PHOS: 192 U/L / ALT: 37 U/L / AST: 32 U/L / GGT: x               Culture - Blood (collected 09-14-22 @ 20:10)  Source: .Blood Blood  Preliminary Report (09-16-22 @ 02:01):    No growth to date.    Culture - Blood (collected 09-14-22 @ 17:50)  Source: .Blood Blood-Peripheral  Preliminary Report (09-16-22 @ 01:02):    No growth to date.            INFECTIOUS DISEASES TESTING      RADIOLOGY & ADDITIONAL TESTS:  I have personally reviewed the last Chest xray  CXR      CT      CARDIOLOGY TESTING  12 Lead ECG:   Ventricular Rate 88 BPM    Atrial Rate 88 BPM    P-R Interval 164 ms    QRS Duration 102 ms    Q-T Interval 402 ms    QTC Calculation(Bazett) 486 ms    P Axis 19 degrees    R Axis -34 degrees    T Axis 106 degrees    Diagnosis Line Normal sinus rhythm  Left axis deviation  Possible Anterior infarct , age undetermined  T wave abnormality, consider lateral ischemia  Abnormal ECG    Confirmed by Yusef Isaacs ([id number]) on 9/15/2022 7:01:40 AM (09-14-22 @ 14:38)  12 Lead ECG:   Ventricular Rate 92 BPM    Atrial Rate 92 BPM    P-R Interval 178 ms    QRS Duration 92 ms    Q-T Interval 390 ms    QTC Calculation(Bazett) 482 ms    P Axis 18 degrees    R Axis 102 degrees    T Axis 95 degrees    Diagnosis Line Normal sinus rhythm  Rightward axis  Incomplete right bundle branch block  Septal infarct , age undetermined  Abnormal ECG    Confirmed by Yusef Isaacs ([id number]) on 9/8/2022 7:28:00 AM (09-07-22 @ 19:03)      MEDICATIONS  aspirin  chewable 81  atorvastatin 40  cefepime   IVPB 500  dextrose 5%. 1000  dextrose 5%. 1000  dextrose 50% Injectable 25  dextrose 50% Injectable 12.5  dextrose 50% Injectable 25  furosemide    Tablet 80  glucagon  Injectable 1  heparin   Injectable 5000  influenza   Vaccine 0.5  insulin glargine Injectable (LANTUS) 25  insulin lispro (ADMELOG) corrective regimen sliding scale   metoprolol tartrate 50  metroNIDAZOLE  IVPB 500  pantoprazole    Tablet 40  sevelamer carbonate 1600      ANTIBIOTICS:  cefepime   IVPB 500 milliGRAM(s) IV Intermittent every 24 hours  metroNIDAZOLE  IVPB 500 milliGRAM(s) IV Intermittent every 8 hours      All available historical data has been reviewed

## 2022-09-19 NOTE — PROGRESS NOTE ADULT - SUBJECTIVE AND OBJECTIVE BOX
SCHWABACHER, LAWRENCE  62y  Male      Patient is a 62y old  Male who presents with a chief complaint of LLE cellulitis and foreign body (17 Sep 2022 10:39)      INTERVAL HPI/OVERNIGHT EVENTS:  -pt seen this am   -s/p OR procedure/glass removal Friday   -c/w current IV abx - ID follow up appreciated   -dc planning once cleared by ID     REVIEW OF SYSTEMS:  -no new complaints   -doing well     Vital Signs Last 24 Hrs  T(C): 36.3 (18 Sep 2022 12:36), Max: 36.8 (18 Sep 2022 05:14)  T(F): 97.4 (18 Sep 2022 12:36), Max: 98.2 (18 Sep 2022 05:14)  HR: 88 (18 Sep 2022 12:36) (78 - 88)  BP: 160/73 (18 Sep 2022 12:36) (143/96 - 178/77)  BP(mean): --  RR: 18 (18 Sep 2022 12:36) (18 - 18)  SpO2: 99% (17 Sep 2022 20:24) (99% - 99%)    Parameters below as of 17 Sep 2022 20:24  Patient On (Oxygen Delivery Method): room air      PHYSICAL EXAM:  GENERAL: NAD, sitting up eating breakfast   HEAD:  Atraumatic, Normocephalic  EYES: EOMI, PERRLA, conjunctiva and sclera clear  NERVOUS SYSTEM:  Alert & Oriented X 4, Good concentration  CHEST/LUNG: Clear b/l  CV/HEART: Regular rate and rhythm; No murmurs, rubs, or gallops  GI/ABDOMEN: Soft and non tender   EXTREMITIES:  see skin   SKIN: b/l foot dressing in place     LAB:                                 9.3    16.25 )-----------( 212      ( 18 Sep 2022 09:18 )             27.1   09-18    134<L>  |  96<L>  |  47<H>  ----------------------------<  104<H>  4.5   |  23  |  5.9<HH>    Ca    8.0<L>      18 Sep 2022 09:18  Phos  2.6     09-18  Mg     1.8     09-18    TPro  6.3  /  Alb  3.1<L>  /  TBili  0.2  /  DBili  x   /  AST  32  /  ALT  37  /  AlkPhos  192<H>  09-18    Daily     Daily   CAPILLARY BLOOD GLUCOSE    POCT Blood Glucose.: 180 mg/dL (17 Sep 2022 12:14)  POCT Blood Glucose.: 92 mg/dL (17 Sep 2022 07:47)  POCT Blood Glucose.: 253 mg/dL (16 Sep 2022 21:00)  POCT Blood Glucose.: 155 mg/dL (16 Sep 2022 16:35)    LIVER FUNCTIONS - ( 17 Sep 2022 08:57 )  Alb: 3.1 g/dL / Pro: 6.3 g/dL / ALK PHOS: 190 U/L / ALT: 42 U/L / AST: 33 U/L / GGT: x             RADIOLOGY:    Imaging Personally visualized and Reviewed:  [y ] YES  [ ] NO    HEALTH ISSUES - PROBLEM Dx:    MEDICATIONS  (STANDING):  aspirin  chewable 81 milliGRAM(s) Oral daily  atorvastatin 40 milliGRAM(s) Oral at bedtime  cefepime   IVPB 500 milliGRAM(s) IV Intermittent every 24 hours  dextrose 5%. 1000 milliLiter(s) (100 mL/Hr) IV Continuous <Continuous>  dextrose 5%. 1000 milliLiter(s) (50 mL/Hr) IV Continuous <Continuous>  dextrose 50% Injectable 25 Gram(s) IV Push once  dextrose 50% Injectable 12.5 Gram(s) IV Push once  dextrose 50% Injectable 25 Gram(s) IV Push once  furosemide    Tablet 80 milliGRAM(s) Oral daily  glucagon  Injectable 1 milliGRAM(s) IntraMuscular once  heparin   Injectable 5000 Unit(s) SubCutaneous every 8 hours  influenza   Vaccine 0.5 milliLiter(s) IntraMuscular once  insulin glargine Injectable (LANTUS) 25 Unit(s) SubCutaneous at bedtime  insulin lispro (ADMELOG) corrective regimen sliding scale   SubCutaneous three times a day before meals  metoprolol tartrate 50 milliGRAM(s) Oral two times a day  metroNIDAZOLE  IVPB 500 milliGRAM(s) IV Intermittent every 8 hours  pantoprazole    Tablet 40 milliGRAM(s) Oral before breakfast  sevelamer carbonate 1600 milliGRAM(s) Oral three times a day with meals    MEDICATIONS  (PRN):  dextrose Oral Gel 15 Gram(s) Oral once PRN Blood Glucose LESS THAN 70 milliGRAM(s)/deciliter  dextrose Oral Gel 15 Gram(s) Oral once PRN Blood Glucose LESS THAN 70 milliGRAM(s)/deciliter

## 2022-09-19 NOTE — PROGRESS NOTE ADULT - SUBJECTIVE AND OBJECTIVE BOX
Nephrology progress note    THIS IS AN INCOMPLETE NOTE . FULL NOTE TO FOLLOW SHORTLY    Patient is seen and examined, events over the last 24 h noted .    Allergies:  No Known Allergies    Hospital Medications:   MEDICATIONS  (STANDING):  aspirin  chewable 81 milliGRAM(s) Oral daily  atorvastatin 40 milliGRAM(s) Oral at bedtime  cefepime   IVPB 500 milliGRAM(s) IV Intermittent every 24 hours  dextrose 5%. 1000 milliLiter(s) (50 mL/Hr) IV Continuous <Continuous>  dextrose 5%. 1000 milliLiter(s) (100 mL/Hr) IV Continuous <Continuous>  dextrose 50% Injectable 25 Gram(s) IV Push once  dextrose 50% Injectable 12.5 Gram(s) IV Push once  dextrose 50% Injectable 25 Gram(s) IV Push once  furosemide    Tablet 80 milliGRAM(s) Oral daily  glucagon  Injectable 1 milliGRAM(s) IntraMuscular once  heparin   Injectable 5000 Unit(s) SubCutaneous every 8 hours  influenza   Vaccine 0.5 milliLiter(s) IntraMuscular once  insulin glargine Injectable (LANTUS) 25 Unit(s) SubCutaneous at bedtime  insulin lispro (ADMELOG) corrective regimen sliding scale   SubCutaneous three times a day before meals  metoprolol tartrate 50 milliGRAM(s) Oral two times a day  metroNIDAZOLE  IVPB 500 milliGRAM(s) IV Intermittent every 8 hours  pantoprazole    Tablet 40 milliGRAM(s) Oral before breakfast  sevelamer carbonate 1600 milliGRAM(s) Oral three times a day with meals        VITALS:  T(F): 98.2 (09-19-22 @ 05:00), Max: 98.5 (09-18-22 @ 21:18)  HR: 82 (09-19-22 @ 05:00)  BP: 171/71 (09-19-22 @ 05:00)  RR: 18 (09-19-22 @ 05:00)  SpO2: 98% (09-18-22 @ 21:06)  Wt(kg): --    09-17 @ 07:01  -  09-18 @ 07:00  --------------------------------------------------------  IN: 0 mL / OUT: 1 mL / NET: -1 mL          PHYSICAL EXAM:  Constitutional: NAD  HEENT: anicteric sclera, oropharynx clear, MMM  Neck: No JVD  Respiratory: CTAB, no wheezes, rales or rhonchi  Cardiovascular: S1, S2, RRR  Gastrointestinal: BS+, soft, NT/ND  Extremities: No cyanosis or clubbing. No peripheral edema  :  No schneider.   Skin: No rashes    LABS:  09-18    134<L>  |  96<L>  |  47<H>  ----------------------------<  104<H>  4.5   |  23  |  5.9<HH>    Ca    8.0<L>      18 Sep 2022 09:18  Phos  2.6     09-18  Mg     1.8     09-18    TPro  6.3  /  Alb  3.1<L>  /  TBili  0.2  /  DBili      /  AST  32  /  ALT  37  /  AlkPhos  192<H>  09-18                          9.3    16.25 )-----------( 212      ( 18 Sep 2022 09:18 )             27.1       Urine Studies:        PTH -- (Ca 7.5)      [02-26-22 @ 16:00]   312  Vitamin D (25OH) 21      [02-26-22 @ 16:00]  HbA1c 5.8      [01-30-20 @ 06:46]  Lipid: chol 76, , HDL 29, LDL --      [09-15-22 @ 09:24]    HBsAb <3.0      [12-29-19 @ 17:44]  HBsAb Nonreact      [12-29-19 @ 17:44]  HBsAg Nonreact      [12-29-19 @ 17:44]  HBcAb Nonreact      [12-29-19 @ 17:44]  HCV 0.10, Nonreact      [12-29-19 @ 17:44]        RADIOLOGY & ADDITIONAL STUDIES:   Nephrology progress note  Patient is seen and examined, events over the last 24 h noted .  sitting in chair comfortable     Allergies:  No Known Allergies    Hospital Medications:   MEDICATIONS  (STANDING):  aspirin  chewable 81 milliGRAM(s) Oral daily  atorvastatin 40 milliGRAM(s) Oral at bedtime  cefepime   IVPB 500 milliGRAM(s) IV Intermittent every 24 hours  furosemide    Tablet 80 milliGRAM(s) Oral daily  glucagon  Injectable 1 milliGRAM(s) IntraMuscular once  heparin   Injectable 5000 Unit(s) SubCutaneous every 8 hours  influenza   Vaccine 0.5 milliLiter(s) IntraMuscular once  insulin glargine Injectable (LANTUS) 25 Unit(s) SubCutaneous at bedtime  insulin lispro (ADMELOG) corrective regimen sliding scale   SubCutaneous three times a day before meals  metoprolol tartrate 50 milliGRAM(s) Oral two times a day  metroNIDAZOLE  IVPB 500 milliGRAM(s) IV Intermittent every 8 hours  pantoprazole    Tablet 40 milliGRAM(s) Oral before breakfast  sevelamer carbonate 1600 milliGRAM(s) Oral three times a day with meals        VITALS:  T(F): 98.2 (09-19-22 @ 05:00), Max: 98.5 (09-18-22 @ 21:18)  HR: 82 (09-19-22 @ 05:00)  BP: 171/71 (09-19-22 @ 05:00)  RR: 18 (09-19-22 @ 05:00)  SpO2: 98% (09-18-22 @ 21:06)      09-17 @ 07:01  -  09-18 @ 07:00  --------------------------------------------------------  IN: 0 mL / OUT: 1 mL / NET: -1 mL          PHYSICAL EXAM:  Constitutional: NAD  Neck: No JVD  Respiratory: CTA  Cardiovascular: S1, S2, RRR  Gastrointestinal: BS+, soft, NT/ND  Extremities: No cyanosis or clubbing. peripheral edema/ feet in dressing   Skin: No rashes    LABS:  09-19    134<L>  |  96<L>  |  60<H>  ----------------------------<  171<H>  4.3   |  23  |  7.0<HH>    Ca    8.2<L>      19 Sep 2022 09:26  Phos  3.1     09-19  Mg     1.8     09-19    TPro  6.3  /  Alb  2.8<L>  /  TBili  0.3  /  DBili  x   /  AST  23  /  ALT  28  /  AlkPhos  165<H>  09-19 09-18    134<L>  |  96<L>  |  47<H>  ----------------------------<  104<H>  4.5   |  23  |  5.9<HH>    Ca    8.0<L>      18 Sep 2022 09:18  Phos  2.6     09-18  Mg     1.8     09-18    TPro  6.3  /  Alb  3.1<L>  /  TBili  0.2  /  DBili      /  AST  32  /  ALT  37  /  AlkPhos  192<H>  09-18                          9.3    16.25 )-----------( 212      ( 18 Sep 2022 09:18 )             27.1       Urine Studies:        PTH -- (Ca 7.5)      [02-26-22 @ 16:00]   312  Vitamin D (25OH) 21      [02-26-22 @ 16:00]  HbA1c 5.8      [01-30-20 @ 06:46]  Lipid: chol 76, , HDL 29, LDL --      [09-15-22 @ 09:24]    HBsAb <3.0      [12-29-19 @ 17:44]  HBsAb Nonreact      [12-29-19 @ 17:44]  HBsAg Nonreact      [12-29-19 @ 17:44]  HBcAb Nonreact      [12-29-19 @ 17:44]  HCV 0.10, Nonreact      [12-29-19 @ 17:44]        RADIOLOGY & ADDITIONAL STUDIES:

## 2022-09-19 NOTE — PROGRESS NOTE ADULT - SUBJECTIVE AND OBJECTIVE BOX
Podiatry Progress Note    Subjective:  SCHWABACHER, LAWRENCE is a  62y Male.   Seen bedside.   Patient is a 62y old  Male who presents with a chief complaint of LLE cellulitis and foreign body (19 Sep 2022 09:48)      Past Medical History and Surgical History  PAST MEDICAL & SURGICAL HISTORY:  CAD (coronary artery disease)      S/P CABG (coronary artery bypass graft)  x4      Diabetes mellitus      Transient ischemic attack (TIA)  2017; 2008      Chronic kidney disease (CKD)  Stage IV      Hypertension      Stented coronary artery  in 2008      Myocardial infarction  2012      PAD (peripheral artery disease)  S/p bypass left leg      HLD (hyperlipidemia)      BPH (benign prostatic hyperplasia)      Pain in left knee  s/p fall      OA (osteoarthritis)      [place] (hard of hearing)      Chronic anemia      S/P CABG (coronary artery bypass graft)  2012      H/O arterial bypass of lower limb  Left Lower Extremity (2016)      History of surgery  Left CEA (2017)  Left Pinkie toe Amputation (2014)  CABG x 4 (2012)  Card cath - stent (2008)        AV fistula  2019  LEFT AV FISTULA           Objective:  Vital Signs Last 24 Hrs  T(C): 36.8 (19 Sep 2022 05:00), Max: 36.9 (18 Sep 2022 21:18)  T(F): 98.2 (19 Sep 2022 05:00), Max: 98.5 (18 Sep 2022 21:18)  HR: 82 (19 Sep 2022 05:00) (82 - 88)  BP: 171/71 (19 Sep 2022 05:00) (147/65 - 171/71)  BP(mean): 102 (19 Sep 2022 05:00) (102 - 102)  RR: 18 (19 Sep 2022 05:00) (18 - 18)  SpO2: 98% (18 Sep 2022 21:06) (98% - 98%)    Parameters below as of 18 Sep 2022 21:06  Patient On (Oxygen Delivery Method): room air                            9.3    16.25 )-----------( 212      ( 18 Sep 2022 09:18 )             27.1                 09-18    134<L>  |  96<L>  |  47<H>  ----------------------------<  104<H>  4.5   |  23  |  5.9<HH>    Ca    8.0<L>      18 Sep 2022 09:18  Phos  2.6     09-18  Mg     1.8     09-18    TPro  6.3  /  Alb  3.1<L>  /  TBili  0.2  /  DBili  x   /  AST  32  /  ALT  37  /  AlkPhos  192<H>  09-18        Physical Exam - Lower Extremity Focused:   Derm: Left heel ulcer with granulofibrotic tissue and periwound maceration, improved from before, mild serosanguinous drainage,   Vascular: DP and PT Pulses Diminished; Foot is Warm to Warm to the touch; Capillary Refill Time < 3 Seconds;    Neuro: Protective Sensation Diminished / Moderately Neuropathic   MSK: Pain On Palpation at Wound Site     Assessment:     Foreign body (glass) left foot  Cellulitic Left Lower Extremity - improving  s/p removal of FB 9/16    Plan:  Chart reviewed and Patient evaluated. All Questions and Concerns Addressed and Answered  Local Wound Care; Wound Flushed w/ NS; Wound Packed w/ xeroform / DSD / Kerlix / ACE  Weight Bearing Status; WBAT w/ Heel Touch w/ Surgical Shoe;   Continue w/ Local Wound Care; Q24 Dressing Changes;  CW IV abx  No Further Sx Debridement at this time.   Request: PICC insertion prior to discharge to continue IV abx  Patient stable for discharge from podiatry standpoint  Will f/u w/Dr. Pacheco outpatient in clinic   Discussed Plan w/ Attending;     Podiatry

## 2022-09-19 NOTE — PROCEDURE NOTE - ADDITIONAL PROCEDURE DETAILS
Utilizing ultrasound guidance,  a midline was inserted into the  right upper arm. Prior to needle insertion, the patency of the vein was confirmed with ultrasound.  The representative images are stored on the [employer] application  Procedure team was consulted to perform urgent midline procedure for the diagnosis of cellulitis. I was present for the key critical aspects of the procedure performed during the care of the patient.

## 2022-09-19 NOTE — PROGRESS NOTE ADULT - TIME BILLING
direct patient care and chart review   -coordinated current plan of care with medical staff on MDR
direct patient care and chart review   -coordinated current plan of care with medical staff on MDR
cellulitis
cellulitis
direct patient care and chart review   -coordinated current plan of care with medical staff on MDR

## 2022-09-19 NOTE — PROGRESS NOTE ADULT - SUBJECTIVE AND OBJECTIVE BOX
SUBJECTIVE:    Patient is a 62y old Male who presents with a chief complaint of LLE cellulitis and foreign body (19 Sep 2022 10:34)    Currently admitted to medicine with the primary diagnosis of Skin foreign body       Today is hospital day 5d. This morning he is resting comfortably in bed and reports no new issues or overnight events.       PAST MEDICAL & SURGICAL HISTORY  CAD (coronary artery disease)    S/P CABG (coronary artery bypass graft)  x4    Diabetes mellitus    Transient ischemic attack (TIA)  2017; 2008    Chronic kidney disease (CKD)  Stage IV    Hypertension    Stented coronary artery  in 2008    Myocardial infarction  2012    PAD (peripheral artery disease)  S/p bypass left leg    HLD (hyperlipidemia)    BPH (benign prostatic hyperplasia)    Pain in left knee  s/p fall    OA (osteoarthritis)    [place] (hard of hearing)    Chronic anemia    S/P CABG (coronary artery bypass graft)  2012    H/O arterial bypass of lower limb  Left Lower Extremity (2016)    History of surgery  Left CEA (2017)  Left Pinkie toe Amputation (2014)  CABG x 4 (2012)  Card cath - stent (2008)      AV fistula  2019  LEFT AV FISTULA        ALLERGIES:  No Known Allergies    MEDICATIONS:  STANDING MEDICATIONS  aspirin  chewable 81 milliGRAM(s) Oral daily  atorvastatin 40 milliGRAM(s) Oral at bedtime  cefepime   IVPB 500 milliGRAM(s) IV Intermittent every 24 hours  dextrose 5%. 1000 milliLiter(s) IV Continuous <Continuous>  dextrose 5%. 1000 milliLiter(s) IV Continuous <Continuous>  dextrose 50% Injectable 25 Gram(s) IV Push once  dextrose 50% Injectable 12.5 Gram(s) IV Push once  dextrose 50% Injectable 25 Gram(s) IV Push once  furosemide    Tablet 80 milliGRAM(s) Oral daily  glucagon  Injectable 1 milliGRAM(s) IntraMuscular once  heparin   Injectable 5000 Unit(s) SubCutaneous every 8 hours  influenza   Vaccine 0.5 milliLiter(s) IntraMuscular once  insulin glargine Injectable (LANTUS) 25 Unit(s) SubCutaneous at bedtime  insulin lispro (ADMELOG) corrective regimen sliding scale   SubCutaneous three times a day before meals  metoprolol tartrate 50 milliGRAM(s) Oral two times a day  metroNIDAZOLE  IVPB 500 milliGRAM(s) IV Intermittent every 8 hours  pantoprazole    Tablet 40 milliGRAM(s) Oral before breakfast  sevelamer carbonate 1600 milliGRAM(s) Oral three times a day with meals    PRN MEDICATIONS  dextrose Oral Gel 15 Gram(s) Oral once PRN  dextrose Oral Gel 15 Gram(s) Oral once PRN    VITALS:   T(F): 98.2  HR: 82  BP: 171/71  RR: 18  SpO2: 98%    LABS:                        8.8    13.75 )-----------( 197      ( 19 Sep 2022 09:26 )             26.4     09-19    134<L>  |  96<L>  |  60<H>  ----------------------------<  171<H>  4.3   |  23  |  x     Ca    8.2<L>      19 Sep 2022 09:26  Phos  3.1     09-19  Mg     1.8     09-19    TPro  6.3  /  Alb  2.8<L>  /  TBili  0.3  /  DBili  x   /  AST  23  /  ALT  28  /  AlkPhos  165<H>  09-19                  PHYSICAL EXAM:  GEN: No acute distress  PULM/CHEST: Clear to auscultation bilaterally, no rales, rhonchi or wheezes   CVS: Regular rate and rhythm, S1-S2, no murmurs  ABD: Soft, non-tender, non-distended, +BS  EXT: +2 LE edema, L sided AVF  NEURO: AAOx3

## 2022-09-20 ENCOUNTER — TRANSCRIPTION ENCOUNTER (OUTPATIENT)
Age: 62
End: 2022-09-20

## 2022-09-20 VITALS
RESPIRATION RATE: 18 BRPM | HEART RATE: 78 BPM | DIASTOLIC BLOOD PRESSURE: 76 MMHG | SYSTOLIC BLOOD PRESSURE: 175 MMHG | TEMPERATURE: 96 F

## 2022-09-20 LAB
-  AMIKACIN: SIGNIFICANT CHANGE UP
-  AMOXICILLIN/CLAVULANIC ACID: SIGNIFICANT CHANGE UP
-  AMPICILLIN/SULBACTAM: SIGNIFICANT CHANGE UP
-  AMPICILLIN: SIGNIFICANT CHANGE UP
-  AZTREONAM: SIGNIFICANT CHANGE UP
-  CEFAZOLIN: SIGNIFICANT CHANGE UP
-  CEFEPIME: SIGNIFICANT CHANGE UP
-  CEFOXITIN: SIGNIFICANT CHANGE UP
-  CEFTRIAXONE: SIGNIFICANT CHANGE UP
-  CIPROFLOXACIN: SIGNIFICANT CHANGE UP
-  ERTAPENEM: SIGNIFICANT CHANGE UP
-  GENTAMICIN: SIGNIFICANT CHANGE UP
-  IMIPENEM: SIGNIFICANT CHANGE UP
-  LEVOFLOXACIN: SIGNIFICANT CHANGE UP
-  MEROPENEM: SIGNIFICANT CHANGE UP
-  PIPERACILLIN/TAZOBACTAM: SIGNIFICANT CHANGE UP
-  TOBRAMYCIN: SIGNIFICANT CHANGE UP
-  TRIMETHOPRIM/SULFAMETHOXAZOLE: SIGNIFICANT CHANGE UP
ALBUMIN SERPL ELPH-MCNC: 2.9 G/DL — LOW (ref 3.5–5.2)
ALP SERPL-CCNC: 124 U/L — HIGH (ref 30–115)
ALT FLD-CCNC: 27 U/L — SIGNIFICANT CHANGE UP (ref 0–41)
ANION GAP SERPL CALC-SCNC: 14 MMOL/L — SIGNIFICANT CHANGE UP (ref 7–14)
AST SERPL-CCNC: 24 U/L — SIGNIFICANT CHANGE UP (ref 0–41)
BASOPHILS # BLD AUTO: 0.08 K/UL — SIGNIFICANT CHANGE UP (ref 0–0.2)
BASOPHILS NFR BLD AUTO: 0.6 % — SIGNIFICANT CHANGE UP (ref 0–1)
BILIRUB SERPL-MCNC: 0.3 MG/DL — SIGNIFICANT CHANGE UP (ref 0.2–1.2)
BUN SERPL-MCNC: 38 MG/DL — HIGH (ref 10–20)
CALCIUM SERPL-MCNC: 8.3 MG/DL — LOW (ref 8.4–10.4)
CHLORIDE SERPL-SCNC: 96 MMOL/L — LOW (ref 98–110)
CO2 SERPL-SCNC: 26 MMOL/L — SIGNIFICANT CHANGE UP (ref 17–32)
CREAT SERPL-MCNC: 5 MG/DL — CRITICAL HIGH (ref 0.7–1.5)
CULTURE RESULTS: SIGNIFICANT CHANGE UP
CULTURE RESULTS: SIGNIFICANT CHANGE UP
EGFR: 12 ML/MIN/1.73M2 — LOW
EOSINOPHIL # BLD AUTO: 0.15 K/UL — SIGNIFICANT CHANGE UP (ref 0–0.7)
EOSINOPHIL NFR BLD AUTO: 1.2 % — SIGNIFICANT CHANGE UP (ref 0–8)
GLUCOSE BLDC GLUCOMTR-MCNC: 118 MG/DL — HIGH (ref 70–99)
GLUCOSE BLDC GLUCOMTR-MCNC: 213 MG/DL — HIGH (ref 70–99)
GLUCOSE SERPL-MCNC: 176 MG/DL — HIGH (ref 70–99)
HCT VFR BLD CALC: 29 % — LOW (ref 42–52)
HGB BLD-MCNC: 9.5 G/DL — LOW (ref 14–18)
IMM GRANULOCYTES NFR BLD AUTO: 0.6 % — HIGH (ref 0.1–0.3)
LYMPHOCYTES # BLD AUTO: 0.86 K/UL — LOW (ref 1.2–3.4)
LYMPHOCYTES # BLD AUTO: 6.9 % — LOW (ref 20.5–51.1)
MAGNESIUM SERPL-MCNC: 1.9 MG/DL — SIGNIFICANT CHANGE UP (ref 1.8–2.4)
MCHC RBC-ENTMCNC: 32.3 PG — HIGH (ref 27–31)
MCHC RBC-ENTMCNC: 32.8 G/DL — SIGNIFICANT CHANGE UP (ref 32–37)
MCV RBC AUTO: 98.6 FL — HIGH (ref 80–94)
METHOD TYPE: SIGNIFICANT CHANGE UP
MONOCYTES # BLD AUTO: 0.71 K/UL — HIGH (ref 0.1–0.6)
MONOCYTES NFR BLD AUTO: 5.7 % — SIGNIFICANT CHANGE UP (ref 1.7–9.3)
NEUTROPHILS # BLD AUTO: 10.66 K/UL — HIGH (ref 1.4–6.5)
NEUTROPHILS NFR BLD AUTO: 85 % — HIGH (ref 42.2–75.2)
NRBC # BLD: 0 /100 WBCS — SIGNIFICANT CHANGE UP (ref 0–0)
PHOSPHATE SERPL-MCNC: 3.1 MG/DL — SIGNIFICANT CHANGE UP (ref 2.1–4.9)
PLATELET # BLD AUTO: 219 K/UL — SIGNIFICANT CHANGE UP (ref 130–400)
POTASSIUM SERPL-MCNC: 4.4 MMOL/L — SIGNIFICANT CHANGE UP (ref 3.5–5)
POTASSIUM SERPL-SCNC: 4.4 MMOL/L — SIGNIFICANT CHANGE UP (ref 3.5–5)
PROT SERPL-MCNC: 6.5 G/DL — SIGNIFICANT CHANGE UP (ref 6–8)
RBC # BLD: 2.94 M/UL — LOW (ref 4.7–6.1)
RBC # FLD: 14.6 % — HIGH (ref 11.5–14.5)
SARS-COV-2 RNA SPEC QL NAA+PROBE: DETECTED
SODIUM SERPL-SCNC: 136 MMOL/L — SIGNIFICANT CHANGE UP (ref 135–146)
SPECIMEN SOURCE: SIGNIFICANT CHANGE UP
SPECIMEN SOURCE: SIGNIFICANT CHANGE UP
SURGICAL PATHOLOGY STUDY: SIGNIFICANT CHANGE UP
WBC # BLD: 12.54 K/UL — HIGH (ref 4.8–10.8)
WBC # FLD AUTO: 12.54 K/UL — HIGH (ref 4.8–10.8)

## 2022-09-20 PROCEDURE — 99239 HOSP IP/OBS DSCHRG MGMT >30: CPT

## 2022-09-20 RX ORDER — AMLODIPINE BESYLATE 2.5 MG/1
10 TABLET ORAL EVERY 24 HOURS
Refills: 0 | Status: DISCONTINUED | OUTPATIENT
Start: 2022-09-20 | End: 2022-09-20

## 2022-09-20 RX ORDER — VANCOMYCIN HCL 1 G
750 VIAL (EA) INTRAVENOUS ONCE
Refills: 0 | Status: COMPLETED | OUTPATIENT
Start: 2022-09-20 | End: 2022-09-20

## 2022-09-20 RX ORDER — AMLODIPINE BESYLATE 2.5 MG/1
1 TABLET ORAL
Qty: 30 | Refills: 0
Start: 2022-09-20 | End: 2022-10-19

## 2022-09-20 RX ORDER — CEFEPIME 1 G/1
2 INJECTION, POWDER, FOR SOLUTION INTRAMUSCULAR; INTRAVENOUS
Qty: 200 | Refills: 0
Start: 2022-09-20 | End: 2022-10-03

## 2022-09-20 RX ORDER — SEVELAMER CARBONATE 2400 MG/1
2 POWDER, FOR SUSPENSION ORAL
Qty: 0 | Refills: 0 | DISCHARGE

## 2022-09-20 RX ORDER — SEVELAMER CARBONATE 2400 MG/1
800 POWDER, FOR SUSPENSION ORAL
Refills: 0 | Status: DISCONTINUED | OUTPATIENT
Start: 2022-09-20 | End: 2022-09-20

## 2022-09-20 RX ORDER — VANCOMYCIN HCL 1 G
0.75 VIAL (EA) INTRAVENOUS
Qty: 4.5 | Refills: 0
Start: 2022-09-20 | End: 2022-10-03

## 2022-09-20 RX ORDER — POLYETHYLENE GLYCOL 3350 17 G/17G
17 POWDER, FOR SOLUTION ORAL ONCE
Refills: 0 | Status: COMPLETED | OUTPATIENT
Start: 2022-09-20 | End: 2022-09-20

## 2022-09-20 RX ORDER — SENNA PLUS 8.6 MG/1
2 TABLET ORAL ONCE
Refills: 0 | Status: COMPLETED | OUTPATIENT
Start: 2022-09-20 | End: 2022-09-20

## 2022-09-20 RX ADMIN — SEVELAMER CARBONATE 1600 MILLIGRAM(S): 2400 POWDER, FOR SUSPENSION ORAL at 08:09

## 2022-09-20 RX ADMIN — Medication 80 MILLIGRAM(S): at 05:17

## 2022-09-20 RX ADMIN — Medication 81 MILLIGRAM(S): at 12:00

## 2022-09-20 RX ADMIN — Medication 250 MILLIGRAM(S): at 10:10

## 2022-09-20 RX ADMIN — SENNA PLUS 2 TABLET(S): 8.6 TABLET ORAL at 08:30

## 2022-09-20 RX ADMIN — SEVELAMER CARBONATE 1600 MILLIGRAM(S): 2400 POWDER, FOR SUSPENSION ORAL at 12:00

## 2022-09-20 RX ADMIN — PANTOPRAZOLE SODIUM 40 MILLIGRAM(S): 20 TABLET, DELAYED RELEASE ORAL at 05:16

## 2022-09-20 RX ADMIN — Medication 2: at 12:49

## 2022-09-20 RX ADMIN — POLYETHYLENE GLYCOL 3350 17 GRAM(S): 17 POWDER, FOR SOLUTION ORAL at 08:30

## 2022-09-20 RX ADMIN — Medication 100 MILLIGRAM(S): at 05:16

## 2022-09-20 RX ADMIN — HEPARIN SODIUM 5000 UNIT(S): 5000 INJECTION INTRAVENOUS; SUBCUTANEOUS at 05:17

## 2022-09-20 RX ADMIN — Medication 50 MILLIGRAM(S): at 05:17

## 2022-09-20 NOTE — PROGRESS NOTE ADULT - SUBJECTIVE AND OBJECTIVE BOX
Podiatry Progress Note    Subjective:  SCHWABACHER, LAWRENCE is a  62y Male.   Seen bedside.   Patient is a 62y old  Male who presents with a chief complaint of LLE cellulitis and foreign body (20 Sep 2022 07:39)      Past Medical History and Surgical History  PAST MEDICAL & SURGICAL HISTORY:  CAD (coronary artery disease)      S/P CABG (coronary artery bypass graft)  x4      Diabetes mellitus      Transient ischemic attack (TIA)  2017; 2008      Chronic kidney disease (CKD)  Stage IV      Hypertension      Stented coronary artery  in 2008      Myocardial infarction  2012      PAD (peripheral artery disease)  S/p bypass left leg      HLD (hyperlipidemia)      BPH (benign prostatic hyperplasia)      Pain in left knee  s/p fall      OA (osteoarthritis)      [place] (hard of hearing)      Chronic anemia      S/P CABG (coronary artery bypass graft)  2012      H/O arterial bypass of lower limb  Left Lower Extremity (2016)      History of surgery  Left CEA (2017)  Left Pinkie toe Amputation (2014)  CABG x 4 (2012)  Card cath - stent (2008)        AV fistula  2019  LEFT AV FISTULA           Objective:  Vital Signs Last 24 Hrs  T(C): 36.4 (20 Sep 2022 06:12), Max: 36.4 (19 Sep 2022 19:55)  T(F): 97.5 (20 Sep 2022 06:12), Max: 97.6 (19 Sep 2022 19:55)  HR: 77 (20 Sep 2022 06:12) (77 - 83)  BP: 196/79 (20 Sep 2022 06:12) (149/70 - 196/79)  BP(mean): --  RR: 19 (20 Sep 2022 06:12) (18 - 19)  SpO2: 97% (19 Sep 2022 19:42) (97% - 97%)    Parameters below as of 19 Sep 2022 19:42  Patient On (Oxygen Delivery Method): room air                            9.5    12.54 )-----------( 219      ( 20 Sep 2022 08:11 )             29.0                 09-20    136  |  96<L>  |  38<H>  ----------------------------<  176<H>  4.4   |  26  |  5.0<HH>    Ca    8.3<L>      20 Sep 2022 08:11  Phos  3.1     09-20  Mg     1.9     09-20    TPro  6.5  /  Alb  2.9<L>  /  TBili  0.3  /  DBili  x   /  AST  24  /  ALT  27  /  AlkPhos  124<H>  09-20        Physical Exam - Lower Extremity Focused:   Derm: Left heel ulcer with granulofibrotic tissue and periwound maceration, improved from before, mild serosanguinous drainage,   Vascular: DP and PT Pulses Diminished; Foot is Warm to Warm to the touch; Capillary Refill Time < 3 Seconds;    Neuro: Protective Sensation Diminished / Moderately Neuropathic   MSK: Pain On Palpation at Wound Site     Assessment:     Foreign body (glass) left foot  Cellulitic Left Lower Extremity - improving  s/p removal of FB 9/16    Plan:  Chart reviewed and Patient evaluated. All Questions and Concerns Addressed and Answered  Local Wound Care; Wound Flushed w/ NS; Wound Packed w/ xeroform / DSD / Kerlix / ACE  Weight Bearing Status; WBAT w/ Heel Touch w/ Surgical Shoe;   Continue w/ Local Wound Care; Q24 Dressing Changes;  CW IV abx  No Further Sx Debridement at this time.   Request: PICC insertion prior to discharge to continue IV abx  Patient stable for discharge from podiatry standpoint  Will f/u w/Dr. Pacheco outpatient in clinic   Discussed Plan w/ Attending;     Podiatry

## 2022-09-20 NOTE — DISCHARGE NOTE PROVIDER - NSDCFUSCHEDAPPT_GEN_ALL_CORE_FT
John Malik  [facility]  VASCULAR CARVAJAL [address]  Scheduled Appointment: 09/22/2022    John Malik  [facility]  Scheduled Appointment: 09/22/2022    Landen Galeana  [facility]  NEUROLOGY [address]   Scheduled Appointment: 09/27/2022

## 2022-09-20 NOTE — DISCHARGE NOTE PROVIDER - NSDCMRMEDTOKEN_GEN_ALL_CORE_FT
amLODIPine 10 mg oral tablet: 1 tab(s) orally once a day   aspirin 81 mg oral tablet: 1 tab(s) orally once a day  atorvastatin 40 mg oral tablet: 1 tab(s) orally once a day   cefepime 1 g/50 mL-iso-osmotic dextrose intravenous solution: 2 gram(s) intravenously Monday, Wednesday, and Friday post HD on HD days  furosemide 40 mg oral tablet: 2 tab(s) orally once a day  Levemir 100 units/mL subcutaneous solution: 40 unit(s) subcutaneous once a day (at bedtime)  Metoprolol Tartrate 50 mg oral tablet: 1 tab(s) orally 2 times a day  Plavix 75 mg oral tablet: 1 tab(s) orally once a day  sevelamer carbonate 800 mg oral tablet: 1 tab(s) orally 3 times a day (with meals)  Trulicity Pen 1.5 mg/0.5 mL subcutaneous solution: 1.5  subcutaneous once a week  vancomycin 1 g/200 mL-NaCl 0.9% intravenous solution: 0.75 gram(s) intravenously Monday, Wednesday, and Friday post HD on HD days

## 2022-09-20 NOTE — DISCHARGE NOTE PROVIDER - NSDCCPCAREPLAN_GEN_ALL_CORE_FT
PRINCIPAL DISCHARGE DIAGNOSIS  Diagnosis: Skin foreign body  Assessment and Plan of Treatment: You came in [facility] you had glass in your foot and an infection as well so you were taken to OR and the glass was taken out in addition you will get antibiotics outside the hospital for the infection  Cellulitis  Take the prescribed antibiotic medicine you are given as directed until it is gone. Take it even if you feel better. It treats the infection and stops it from  Not taking all the medicine can make future infections hard to treat. Keep the infected area clean. When possible, raise the infected area above the level of your heart. This helps keep swelling down. Apply clean bandages as advised. Wash your hands often to prevent spreading the infection. In the future, wash your hands before and after you touch cuts, scratches, or bandages. This will help prevent infection.   Call your doctor or returnt o the emergency room or call 911 immediately if you have any of the following: Difficulty or pain when moving the joints above or below the infected area, Discharge or pus draining from the area, rever of 100.4°F (38°C) or higher, or as directed by your healthcare provider, pain that gets worse in or around the infected site, redness that gets worse in or around the infected area, particularly if the area of redness expands to a wider area, shaking chills, swelling of the infected area, vomiting.

## 2022-09-20 NOTE — PROGRESS NOTE ADULT - PROVIDER SPECIALTY LIST ADULT
Detail Level: Detailed
Hospitalist
Internal Medicine
Internal Medicine
Nephrology
Podiatry
Nephrology
Podiatry
Infectious Disease
Internal Medicine
Nephrology
Podiatry
Hospitalist
Nephrology
Nephrology
Infectious Disease
Hospitalist

## 2022-09-20 NOTE — PROGRESS NOTE ADULT - SUBJECTIVE AND OBJECTIVE BOX
seen and examined  no distress   lying comfortable       PAST HISTORY  --------------------------------------------------------------------------------  No significant changes to PMH, PSH, FHx, SHx, unless otherwise noted    ALLERGIES & MEDICATIONS  --------------------------------------------------------------------------------  Allergies    No Known Allergies    Intolerances      Standing Inpatient Medications  aspirin  chewable 81 milliGRAM(s) Oral daily  atorvastatin 40 milliGRAM(s) Oral at bedtime  cefepime   IVPB 500 milliGRAM(s) IV Intermittent every 24 hours  dextrose 5%. 1000 milliLiter(s) IV Continuous <Continuous>  dextrose 5%. 1000 milliLiter(s) IV Continuous <Continuous>  dextrose 50% Injectable 25 Gram(s) IV Push once  dextrose 50% Injectable 12.5 Gram(s) IV Push once  dextrose 50% Injectable 25 Gram(s) IV Push once  furosemide    Tablet 80 milliGRAM(s) Oral daily  glucagon  Injectable 1 milliGRAM(s) IntraMuscular once  heparin   Injectable 5000 Unit(s) SubCutaneous every 8 hours  influenza   Vaccine 0.5 milliLiter(s) IntraMuscular once  insulin glargine Injectable (LANTUS) 25 Unit(s) SubCutaneous at bedtime  insulin lispro (ADMELOG) corrective regimen sliding scale   SubCutaneous three times a day before meals  metoprolol tartrate 50 milliGRAM(s) Oral two times a day  metroNIDAZOLE  IVPB 500 milliGRAM(s) IV Intermittent every 8 hours  pantoprazole    Tablet 40 milliGRAM(s) Oral before breakfast  sevelamer carbonate 1600 milliGRAM(s) Oral three times a day with meals    PRN Inpatient Medications  dextrose Oral Gel 15 Gram(s) Oral once PRN  dextrose Oral Gel 15 Gram(s) Oral once PRN          VITALS/PHYSICAL EXAM  --------------------------------------------------------------------------------  T(C): 36.4 (09-20-22 @ 06:12), Max: 36.4 (09-19-22 @ 19:55)  HR: 77 (09-20-22 @ 06:12) (77 - 83)  BP: 196/79 (09-20-22 @ 06:12) (149/70 - 196/79)  RR: 19 (09-20-22 @ 06:12) (18 - 19)  SpO2: 97% (09-19-22 @ 19:42) (97% - 97%)  Wt(kg): --        09-19-22 @ 07:01  -  09-20-22 @ 07:00  --------------------------------------------------------  IN: 0 mL / OUT: 3000 mL / NET: -3000 mL      Physical Exam:  	Gen: NAD,   	Pulm: CTA B/L  	CV:  S1S2; no rub  	Abd:  soft, nontender/nondistended  	LE: dressing   	Vascular access:av     LABS/STUDIES  --------------------------------------------------------------------------------              8.8    13.75 >-----------<  197      [09-19-22 @ 09:26]              26.4     134  |  96  |  60  ----------------------------<  171      [09-19-22 @ 09:26]  4.3   |  23  |  7.0        Ca     8.2     [09-19-22 @ 09:26]      Mg     1.8     [09-19-22 @ 09:26]      Phos  3.1     [09-19-22 @ 09:26]    TPro  6.3  /  Alb  2.8  /  TBili  0.3  /  DBili  x   /  AST  23  /  ALT  28  /  AlkPhos  165  [09-19-22 @ 09:26]      Creatinine Trend:  SCr 7.0 [09-19 @ 09:26]  SCr 5.9 [09-18 @ 09:18]  SCr 5.1 [09-17 @ 08:57]  SCr 6.3 [09-16 @ 10:07]  SCr 5.2 [09-15 @ 09:24]        PTH -- (Ca 7.5)      [02-26-22 @ 16:00]   312  Vitamin D (25OH) 21      [02-26-22 @ 16:00]  HbA1c 5.8      [01-30-20 @ 06:46]  Lipid: chol 76, , HDL 29, LDL --      [09-15-22 @ 09:24]

## 2022-09-20 NOTE — DISCHARGE NOTE PROVIDER - NSDCFUADDINST_GEN_ALL_CORE_FT
Local Wound Care; Wound Flushed w/ NS; Wound Packed w/ xeroform / DSD / Kerlix / ACE  Weight Bearing Status; WBAT w/ Heel Touch w/ Surgical Shoe;   Continue w/ Local Wound Care; Q24 Dressing Changes;

## 2022-09-20 NOTE — DISCHARGE NOTE PROVIDER - HOSPITAL COURSE
Pt is a 61 y/o M with PMHx of ESRD on HD MWF, CAD s/p stent and CABG, PVD, HTN, DM, HLD, TIA neuropathy was sent to the ED by podiatry for glass in the L foot. Pt reported that he dropped an ashtray about 2 weeks ago and ended up stepping on the glass shards without shoes on. He saw his podiatrist 1 week after and had a foot xray which showed glass in the heel. He had some glass pieces removed and was given a course of abx which he finished. He had a follow up appt the day before admission and repeat foot xray showed additional glass. He was referred in by podiatry for OR removal.     In the hospital patient was found to have a cellulitic LLE with foreign body glass noted in the foot and he is S/P removal of the foreign body on 9/16. He was evaluated by ID and is to be discharged on IV antibiotics post HD and PO flagyl. The patient also got dialysis as scheduled. Pt is a 61 y/o M with PMHx of ESRD on HD MWF, CAD s/p stent and CABG, PVD, HTN, DM, HLD, TIA neuropathy was sent to the ED by podiatry for glass in the L foot. Pt reported that he dropped an ashtray about 2 weeks ago and ended up stepping on the glass shards without shoes on. He saw his podiatrist 1 week after and had a foot xray which showed glass in the heel. He had some glass pieces removed and was given a course of abx which he finished. He had a follow up appt the day before admission and repeat foot xray showed additional glass. He was referred in by podiatry for OR removal.     In the hospital patient was found to have a cellulitic LLE with foreign body glass noted in the foot and he is S/P removal of the foreign body on 9/16. He was evaluated by ID and is to be discharged on IV antibiotics post HD and PO flagyl. The patient also got dialysis as scheduled.       pt seen and examined at bedside   -stable for d/c planning today   -spent over 35 mins d/c plan of care

## 2022-09-20 NOTE — PROGRESS NOTE ADULT - ASSESSMENT
·	Foreign object L foot   ·	LLE Cellulitis   ·	ESRD -on HD MWF  ·	CAD/PCI/CABG     -Continue post-HD Vanco 750mg X 14 days; Midline insertion today, Continue cefepime IVPB 500 milliGRAM(s) IV Intermittent every 24 hours x 14 days + PO Metronidazole 500mg q8h x 14 days - Weekly CBC, CMP, ESR, CRP; telehealth with Dr OLINDA Britton [phone] Tuesday mornings (discussed case with Dr. Britton at bedside   -Podiatry following - s/p OR procedure for glass particles removal - doing well post procedure and dressing change as per their reccs   -continue with current maintenance meds   -skin care as per nursing   -patient aware and agreeable for current plan of care       DISPO; CM to arrange IV abx as per ID reccs - d/c planning   -pt aware of this plan of care     Attending Physician Dr. Annette Augustin # [phone] .     
 63 y/o M with PMHx of ESRD on HD MWF, CAD s/p stent and CABG, PVD, HTN, DM, HLD, TIA, neuropathy was sent to the ED by podiatry for glass in the L foot.  Has a chronic ulcer on R heel but endorses no ulcers on the L foot, reports that L foot lower extremity has been erythematous.  PROBLEMS  #ESRD SP HD fri  Phos noted at target   renal diet   fluid restriction  L heel puncture wound with glass present .   - c/w cefepime flagyl post HD  - ESR 75 and .9  - pending LE arterial duplex    #CAD s/p stent and CABG  - sp surgery removal of foreign body from foot   #Prominent  bowel loop on CXR  clinically asymptomatic  needs bowel regimen    will follow 
PROBLEMS  L heel puncture wound with glass present .  Pt with hx DM & ESRD on HD.  ESR 75; .9  wbc 12.15    New problem with additional W/U  acute illness with systemic symptoms     On cefepime   IVPB 500 milliGRAM(s) IV Intermittent every 24 hours  metroNIDAZOLE  IVPB 500 milliGRAM(s) IV Intermittent every 8 hours    PLAN  - Ultimate foreign body extraction in OR with wound culture  - Continue IV Flagyl & IV Cefepime & post-HD Vanco 750mg  - Repeat wbc
63 y/o M with PMHx of ESRD on HD MWF, CAD s/p stent and CABG, PVD, HTN, DM, HLD, TIA, neuropathy was sent to the ED by podiatry for glass in the L foot.  Has a chronic ulcer on R heel but endorses no ulcers on the L foot, reports that L foot lower extremity has been erythematous.      #Foor ulcer with cellulitis  - ID following   - Continue post-HD Vanco 750mg X 14 days  - Midline  - Continue cefepime   IVPB 500 milliGRAM(s) IV Intermittent every 24 hours x 14 days  - PO Metronidazole 500mg q8h x 14 days  - F/U telehealth Dr OLINDA Britton [phone] Tuesday mornings after discharge  - ESR 75 and .9      #ESRD SP HD fri  - Phos noted at target   - renal diet   - fluid restriction  - HD as per nephro      #Prominent  bowel loop on CXR  - clinically asymptomatic  - had a BM yesterday  - will start Miralax        #HTN  #HLD  #DM  - C/W home meds  - Insulin basal bolus for DM  - monitor FS keep 140-180  - adjust regimen PRN      DVTPPX: hep  Diet renal  Dispo" acute  
  ·	Foreign object L foot   ·	LLE Cellulitis   ·	ESRD -on HD MWF  ·	CAD/PCI/CABG     -IV Cefepime + IV Flagyl; admission blood cultures -ve from 09/14/22 - f/u OR cultures   -ID and Podiatry following - s/p OR procedure for glass particles removal - doing well post procedure and will be continued on  current regimen  -continue with current maintenance meds   -skin care as per nursing   -patient aware and agreeable for current plan of care       DISPO; not discharge ready over the weekend - will follow with ID for home abx choice and duration   -pt aware of this plan of care     Attending Physician Dr. Annette Augustin # [phone] .     
 61 y/o M with PMHx of ESRD on HD MWF, CAD s/p stent and CABG, PVD, HTN, DM, HLD, TIA, neuropathy was sent to the ED by podiatry for glass in the L foot.  Has a chronic ulcer on R heel but endorses no ulcers on the L foot, reports that L foot lower extremity has been erythematous.  PROBLEMS  #ESRD SP  yesterday / hd in am   Phos noted at target / decrease renagel to 1 tablet q 8   renal diet   fluid restriction  L heel puncture wound with glass present . sp OR   - can give cefepime 2 g post hd and vanco post hd , d/c midline / follow vanco level   # BP noted add amlodipine 10 mg q24h  will follow 
Pt is a 61 y/o M with PMHx of ESRD on HD MWF, CAD s/p stent and CABG, PVD, HTN, DM, HLD, TIA, neuropathy was sent to the ED by podiatry for glass in the L foot.  Has a chronic ulcer on R heel but endorses no ulcers on the L foot, reports that L foot lower extremity has been erythematous.  PROBLEMS  #ESRD HD today  3h rs 3 K bath  UF 2.5 L as andrew  will check phos  renal diet   fluid restriction  L heel puncture wound with glass present .   - c/w cefepime flagyl and vancomycin post HD  - ESR 75 and .9  - pending LE arterial duplex    #CAD s/p stent and CABG  - cardio clearance for sx done  #Prominent  bowel loop on CXR  clinically asymptomatic  needs bowel regimen    will follow
 63 y/o M with PMHx of ESRD on HD MWF, CAD s/p stent and CABG, PVD, HTN, DM, HLD, TIA, neuropathy was sent to the ED by podiatry for glass in the L foot.  Has a chronic ulcer on R heel but endorses no ulcers on the L foot, reports that L foot lower extremity has been erythematous.  PROBLEMS  #ESRD SP HD friday   next HD today 3h UF 3l as toelrated   Phos noted at target   renal diet   fluid restriction  L heel puncture wound with glass present . sp OR   - c/w cefepime flagyl post HD  - ESR 75 and .9  - pending LE arterial duplex  # BP noted follow readings post HD / add amlodipine 10 mg q24h  #CAD s/p stent and CABG  #Prominent  bowel loop on CXR  clinically asymptomatic  needs bowel regimen    will follow 
  ·	Foreign object L foot   ·	LLE Cellulitis   ·	ESRD -on HD MWF  ·	CAD/PCI/CABG     -IV Cefepime + IV Flagyl; admission blood cultures -ve from 09/14/22 - f/u OR cultures   -ID and Podiatry following - s/p OR procedure for glass particles removal - doing well post procedure and will be continued on  current regimen  -continue with current maintenance meds   -skin care as per nursing   -patient aware and agreeable for current plan of care       DISPO; not discharge ready over the weekend - will follow with ID for home abx choice and duration   -pt aware of this plan of care     Attending Physician Dr. Annette Augustin # [phone] .     
Pt is a 61 y/o M with PMHx of ESRD on HD MWF, CAD s/p stent and CABG, PVD, HTN, DM, HLD, TIA, neuropathy was sent to the ED by podiatry for glass in the L foot.  Has a chronic ulcer on R heel but endorses no ulcers on the L foot, reports that L foot lower extremity has been erythematous.  PROBLEMS  #ESRD SP HD yesterday   Phos noted attarget   renal diet   fluid restriction  L heel puncture wound with glass present .   - c/w cefepime flagyl post HD  - ESR 75 and .9  - pending LE arterial duplex    #CAD s/p stent and CABG  - sp surgery removal of foreign body from foot   #Prominent  bowel loop on CXR  clinically asymptomatic  needs bowel regimen    will follow
PROBLEMS  L heel puncture wound with glass present .  Pt with hx DM & ESRD on HD.  ESR 75; .9  wbc 12.15 to 16.85  9/16: 3 pieces of glass removed from infected wound in left foot.  No wound C&S noted    On cefepime   IVPB 500 milliGRAM(s) IV Intermittent every 24 hours  metroNIDAZOLE  IVPB 500 milliGRAM(s) IV Intermittent every 8 hours    PLAN  - Continue post-HD Vanco 750mg X 14 days  - Midline  - Continue cefepime   IVPB 500 milliGRAM(s) IV Intermittent every 24 hours x 14 days  - PO Metronidazole 500mg q8h x 14 days  - Weekly CBC, CMP, ESR, CRP  - F/U telehealth Dr OLINDA Britton [phone] Tuesday mornings

## 2022-09-20 NOTE — PROGRESS NOTE ADULT - REASON FOR ADMISSION
LLE cellulitis and foreign body

## 2022-09-20 NOTE — DISCHARGE NOTE PROVIDER - PROVIDER TOKENS
PROVIDER:[TOKEN:[[id number]],FOLLOWUP:[2 weeks]],PROVIDER:[TOKEN:[[id number]],FOLLOWUP:[1-3 days]],PROVIDER:[TOKEN:[[id number]],FOLLOWUP:[1 week]]

## 2022-09-20 NOTE — DISCHARGE NOTE NURSING/CASE MANAGEMENT/SOCIAL WORK - PATIENT PORTAL LINK FT
You can access the FollowMyHealth Patient Portal offered by [facility] by registering at the following website: [email or web address]. By joining [facility]’s FollowMyHealth portal, you will also be able to view your health information using other applications (apps) compatible with our system.

## 2022-09-20 NOTE — DISCHARGE NOTE PROVIDER - CARE PROVIDER_API CALL
Orlando Sandoval)  [address]  [address] [place] [address]  Phone: [phone]  Fax: [phone]  Follow Up Time: 2 weeks    Nancie Cazares)  Internal Medicine; Nephrology  [address] [place] [address]  Phone: [phone]  Fax: [phone]  Follow Up Time: 1-3 days    Nikolai Pacheco  Podiatric Medicine and Surgery  [address] [place] [address]  Phone: [phone]  Fax: [phone]  Follow Up Time: 1 week

## 2022-09-20 NOTE — DISCHARGE NOTE PROVIDER - ATTENDING DISCHARGE PHYSICAL EXAMINATION:
Vital Signs Last 24 Hrs  T(C): 35.6 (20 Sep 2022 12:20), Max: 36.4 (19 Sep 2022 19:55)  T(F): 96.1 (20 Sep 2022 12:20), Max: 97.6 (19 Sep 2022 19:55)  HR: 78 (20 Sep 2022 12:20) (77 - 83)  BP: 175/76 (20 Sep 2022 12:20) (149/70 - 196/79)  BP(mean): --  RR: 18 (20 Sep 2022 12:20) (18 - 19)  SpO2: 97% (19 Sep 2022 19:42) (97% - 97%)    Parameters below as of 19 Sep 2022 19:42  Patient On (Oxygen Delivery Method): room air    PHYSICAL EXAM:  GENERAL: NAD, sitting up in chair - smiling in pleasant mood   HEAD:  Atraumatic, Normocephalic  EYES: EOMI, PERRLA, conjunctiva and sclera clear  NERVOUS SYSTEM:  Alert & Oriented X 4, Good concentration  CHEST/LUNG: Clear b/l  HEART: Regular rate and rhythm; No murmurs, rubs, or gallops  ABDOMEN: Soft abdomen   EXT: see skin   SKIN: LE dressing in place - LLE erythema improved - ankle edema present

## 2022-09-22 ENCOUNTER — APPOINTMENT (OUTPATIENT)
Dept: VASCULAR SURGERY | Facility: HOSPITAL | Age: 62
End: 2022-09-22

## 2022-09-23 LAB
CULTURE RESULTS: SIGNIFICANT CHANGE UP
ORGANISM # SPEC MICROSCOPIC CNT: SIGNIFICANT CHANGE UP
ORGANISM # SPEC MICROSCOPIC CNT: SIGNIFICANT CHANGE UP
SPECIMEN SOURCE: SIGNIFICANT CHANGE UP

## 2022-09-29 DIAGNOSIS — N18.6 END STAGE RENAL DISEASE: ICD-10-CM

## 2022-09-29 DIAGNOSIS — D63.1 ANEMIA IN CHRONIC KIDNEY DISEASE: ICD-10-CM

## 2022-09-29 DIAGNOSIS — S91.342A PUNCTURE WOUND WITH FOREIGN BODY, LEFT FOOT, INITIAL ENCOUNTER: ICD-10-CM

## 2022-09-29 DIAGNOSIS — H91.90 UNSPECIFIED HEARING LOSS, UNSPECIFIED EAR: ICD-10-CM

## 2022-09-29 DIAGNOSIS — E11.22 TYPE 2 DIABETES MELLITUS WITH DIABETIC CHRONIC KIDNEY DISEASE: ICD-10-CM

## 2022-09-29 DIAGNOSIS — M19.90 UNSPECIFIED OSTEOARTHRITIS, UNSPECIFIED SITE: ICD-10-CM

## 2022-09-29 DIAGNOSIS — I25.10 ATHEROSCLEROTIC HEART DISEASE OF NATIVE CORONARY ARTERY WITHOUT ANGINA PECTORIS: ICD-10-CM

## 2022-09-29 DIAGNOSIS — Z95.1 PRESENCE OF AORTOCORONARY BYPASS GRAFT: ICD-10-CM
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
## 2022-09-29 DIAGNOSIS — Z79.82 LONG TERM (CURRENT) USE OF ASPIRIN: ICD-10-CM

## 2022-09-29 DIAGNOSIS — E78.5 HYPERLIPIDEMIA, UNSPECIFIED: ICD-10-CM

## 2022-09-29 DIAGNOSIS — L97.419 NON-PRESSURE CHRONIC ULCER OF RIGHT HEEL AND MIDFOOT WITH UNSPECIFIED SEVERITY: ICD-10-CM

## 2022-09-29 DIAGNOSIS — E11.40 TYPE 2 DIABETES MELLITUS WITH DIABETIC NEUROPATHY, UNSPECIFIED: ICD-10-CM

## 2022-09-29 DIAGNOSIS — E11.51 TYPE 2 DIABETES MELLITUS WITH DIABETIC PERIPHERAL ANGIOPATHY WITHOUT GANGRENE: ICD-10-CM

## 2022-09-29 DIAGNOSIS — I12.0 HYPERTENSIVE CHRONIC KIDNEY DISEASE WITH STAGE 5 CHRONIC KIDNEY DISEASE OR END STAGE RENAL DISEASE: ICD-10-CM

## 2022-09-29 DIAGNOSIS — Z86.73 PERSONAL HISTORY OF TRANSIENT ISCHEMIC ATTACK (TIA), AND CEREBRAL INFARCTION WITHOUT RESIDUAL DEFICITS: ICD-10-CM

## 2022-09-29 DIAGNOSIS — L03.116 CELLULITIS OF LEFT LOWER LIMB: ICD-10-CM

## 2022-09-29 DIAGNOSIS — Z89.422 ACQUIRED ABSENCE OF OTHER LEFT TOE(S): ICD-10-CM

## 2022-09-29 DIAGNOSIS — Z79.899 OTHER LONG TERM (CURRENT) DRUG THERAPY: ICD-10-CM

## 2022-09-29 DIAGNOSIS — Z79.4 LONG TERM (CURRENT) USE OF INSULIN: ICD-10-CM

## 2022-09-29 DIAGNOSIS — N40.0 BENIGN PROSTATIC HYPERPLASIA WITHOUT LOWER URINARY TRACT SYMPTOMS: ICD-10-CM

## 2022-09-29 DIAGNOSIS — Z95.5 PRESENCE OF CORONARY ANGIOPLASTY IMPLANT AND GRAFT: ICD-10-CM
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
## 2022-09-29 DIAGNOSIS — Z79.02 LONG TERM (CURRENT) USE OF ANTITHROMBOTICS/ANTIPLATELETS: ICD-10-CM
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
## 2022-09-29 DIAGNOSIS — I25.2 OLD MYOCARDIAL INFARCTION: ICD-10-CM

## 2022-09-29 DIAGNOSIS — Z99.2 DEPENDENCE ON RENAL DIALYSIS: ICD-10-CM

## 2022-09-29 DIAGNOSIS — I45.10 UNSPECIFIED RIGHT BUNDLE-BRANCH BLOCK: ICD-10-CM

## 2022-09-29 DIAGNOSIS — E11.621 TYPE 2 DIABETES MELLITUS WITH FOOT ULCER: ICD-10-CM

## 2022-10-14 ENCOUNTER — INPATIENT (INPATIENT)
Facility: HOSPITAL | Age: 62
LOS: 24 days | Discharge: NURSING, REHAB OR LONG-TERM CARE | End: 2022-11-08
Attending: INTERNAL MEDICINE | Admitting: INTERNAL MEDICINE
Payer: MEDICARE

## 2022-10-14 VITALS
SYSTOLIC BLOOD PRESSURE: 115 MMHG | OXYGEN SATURATION: 94 % | DIASTOLIC BLOOD PRESSURE: 62 MMHG | RESPIRATION RATE: 94 BRPM | WEIGHT: 198.64 LBS | TEMPERATURE: 100 F | HEART RATE: 128 BPM

## 2022-10-14 DIAGNOSIS — Z98.890 OTHER SPECIFIED POSTPROCEDURAL STATES: Chronic | ICD-10-CM

## 2022-10-14 DIAGNOSIS — Z95.828 PRESENCE OF OTHER VASCULAR IMPLANTS AND GRAFTS: Chronic | ICD-10-CM

## 2022-10-14 DIAGNOSIS — I77.0 ARTERIOVENOUS FISTULA, ACQUIRED: Chronic | ICD-10-CM

## 2022-10-14 DIAGNOSIS — Z95.1 PRESENCE OF AORTOCORONARY BYPASS GRAFT: Chronic | ICD-10-CM

## 2022-10-15 LAB
A1C WITH ESTIMATED AVERAGE GLUCOSE RESULT: 6.8 % — HIGH (ref 4–5.6)
ALBUMIN SERPL ELPH-MCNC: 2.5 G/DL — LOW (ref 3.5–5.2)
ALP SERPL-CCNC: 187 U/L — HIGH (ref 30–115)
ALT FLD-CCNC: 17 U/L — SIGNIFICANT CHANGE UP (ref 0–41)
ANION GAP SERPL CALC-SCNC: 12 MMOL/L — SIGNIFICANT CHANGE UP (ref 7–14)
APTT BLD: 30.9 SEC — SIGNIFICANT CHANGE UP (ref 27–39.2)
AST SERPL-CCNC: 34 U/L — SIGNIFICANT CHANGE UP (ref 0–41)
BASOPHILS # BLD AUTO: 0.05 K/UL — SIGNIFICANT CHANGE UP (ref 0–0.2)
BASOPHILS NFR BLD AUTO: 0.4 % — SIGNIFICANT CHANGE UP (ref 0–1)
BILIRUB SERPL-MCNC: 0.2 MG/DL — SIGNIFICANT CHANGE UP (ref 0.2–1.2)
BUN SERPL-MCNC: 53 MG/DL — HIGH (ref 10–20)
CALCIUM SERPL-MCNC: 8 MG/DL — LOW (ref 8.4–10.4)
CHLORIDE SERPL-SCNC: 94 MMOL/L — LOW (ref 98–110)
CHOLEST SERPL-MCNC: 81 MG/DL — SIGNIFICANT CHANGE UP
CO2 SERPL-SCNC: 26 MMOL/L — SIGNIFICANT CHANGE UP (ref 17–32)
CREAT SERPL-MCNC: 5.4 MG/DL — CRITICAL HIGH (ref 0.7–1.5)
CRP SERPL-MCNC: 141.8 MG/L — HIGH
EGFR: 11 ML/MIN/1.73M2 — LOW
EOSINOPHIL # BLD AUTO: 0.19 K/UL — SIGNIFICANT CHANGE UP (ref 0–0.7)
EOSINOPHIL NFR BLD AUTO: 1.5 % — SIGNIFICANT CHANGE UP (ref 0–8)
ERYTHROCYTE [SEDIMENTATION RATE] IN BLOOD: 142 MM/HR — HIGH (ref 0–10)
ESTIMATED AVERAGE GLUCOSE: 148 MG/DL — HIGH (ref 68–114)
GLUCOSE BLDC GLUCOMTR-MCNC: 166 MG/DL — HIGH (ref 70–99)
GLUCOSE BLDC GLUCOMTR-MCNC: 244 MG/DL — HIGH (ref 70–99)
GLUCOSE BLDC GLUCOMTR-MCNC: 263 MG/DL — HIGH (ref 70–99)
GLUCOSE BLDC GLUCOMTR-MCNC: 319 MG/DL — HIGH (ref 70–99)
GLUCOSE SERPL-MCNC: 262 MG/DL — HIGH (ref 70–99)
HCT VFR BLD CALC: 25.2 % — LOW (ref 42–52)
HDLC SERPL-MCNC: 22 MG/DL — LOW
HGB BLD-MCNC: 8.2 G/DL — LOW (ref 14–18)
IMM GRANULOCYTES NFR BLD AUTO: 0.6 % — HIGH (ref 0.1–0.3)
INR BLD: 1.21 RATIO — SIGNIFICANT CHANGE UP (ref 0.65–1.3)
LIPID PNL WITH DIRECT LDL SERPL: 35 MG/DL — SIGNIFICANT CHANGE UP
LYMPHOCYTES # BLD AUTO: 0.76 K/UL — LOW (ref 1.2–3.4)
LYMPHOCYTES # BLD AUTO: 5.9 % — LOW (ref 20.5–51.1)
MCHC RBC-ENTMCNC: 32 PG — HIGH (ref 27–31)
MCHC RBC-ENTMCNC: 32.5 G/DL — SIGNIFICANT CHANGE UP (ref 32–37)
MCV RBC AUTO: 98.4 FL — HIGH (ref 80–94)
MONOCYTES # BLD AUTO: 0.77 K/UL — HIGH (ref 0.1–0.6)
MONOCYTES NFR BLD AUTO: 6 % — SIGNIFICANT CHANGE UP (ref 1.7–9.3)
NEUTROPHILS # BLD AUTO: 11.06 K/UL — HIGH (ref 1.4–6.5)
NEUTROPHILS NFR BLD AUTO: 85.6 % — HIGH (ref 42.2–75.2)
NON HDL CHOLESTEROL: 59 MG/DL — SIGNIFICANT CHANGE UP
NRBC # BLD: 0 /100 WBCS — SIGNIFICANT CHANGE UP (ref 0–0)
PLATELET # BLD AUTO: 329 K/UL — SIGNIFICANT CHANGE UP (ref 130–400)
POTASSIUM SERPL-MCNC: 4.9 MMOL/L — SIGNIFICANT CHANGE UP (ref 3.5–5)
POTASSIUM SERPL-SCNC: 4.9 MMOL/L — SIGNIFICANT CHANGE UP (ref 3.5–5)
PROT SERPL-MCNC: 6.4 G/DL — SIGNIFICANT CHANGE UP (ref 6–8)
PROTHROM AB SERPL-ACNC: 13.9 SEC — HIGH (ref 9.95–12.87)
RBC # BLD: 2.56 M/UL — LOW (ref 4.7–6.1)
RBC # FLD: 15.9 % — HIGH (ref 11.5–14.5)
SODIUM SERPL-SCNC: 132 MMOL/L — LOW (ref 135–146)
TRIGL SERPL-MCNC: 118 MG/DL — SIGNIFICANT CHANGE UP
WBC # BLD: 12.91 K/UL — HIGH (ref 4.8–10.8)
WBC # FLD AUTO: 12.91 K/UL — HIGH (ref 4.8–10.8)

## 2022-10-15 PROCEDURE — 99223 1ST HOSP IP/OBS HIGH 75: CPT

## 2022-10-15 PROCEDURE — 93010 ELECTROCARDIOGRAM REPORT: CPT

## 2022-10-15 PROCEDURE — 73630 X-RAY EXAM OF FOOT: CPT | Mod: 26,LT

## 2022-10-15 RX ORDER — INSULIN LISPRO 100/ML
VIAL (ML) SUBCUTANEOUS
Refills: 0 | Status: DISCONTINUED | OUTPATIENT
Start: 2022-10-15 | End: 2022-10-21

## 2022-10-15 RX ORDER — ASPIRIN/CALCIUM CARB/MAGNESIUM 324 MG
81 TABLET ORAL DAILY
Refills: 0 | Status: DISCONTINUED | OUTPATIENT
Start: 2022-10-15 | End: 2022-10-18

## 2022-10-15 RX ORDER — SEVELAMER CARBONATE 2400 MG/1
800 POWDER, FOR SUSPENSION ORAL THREE TIMES A DAY
Refills: 0 | Status: DISCONTINUED | OUTPATIENT
Start: 2022-10-15 | End: 2022-10-21

## 2022-10-15 RX ORDER — INSULIN GLARGINE 100 [IU]/ML
20 INJECTION, SOLUTION SUBCUTANEOUS AT BEDTIME
Refills: 0 | Status: DISCONTINUED | OUTPATIENT
Start: 2022-10-15 | End: 2022-10-18

## 2022-10-15 RX ORDER — ONDANSETRON 8 MG/1
4 TABLET, FILM COATED ORAL EVERY 8 HOURS
Refills: 0 | Status: DISCONTINUED | OUTPATIENT
Start: 2022-10-15 | End: 2022-10-21

## 2022-10-15 RX ORDER — DEXTROSE 50 % IN WATER 50 %
15 SYRINGE (ML) INTRAVENOUS ONCE
Refills: 0 | Status: DISCONTINUED | OUTPATIENT
Start: 2022-10-15 | End: 2022-10-21

## 2022-10-15 RX ORDER — DEXTROSE 50 % IN WATER 50 %
12.5 SYRINGE (ML) INTRAVENOUS ONCE
Refills: 0 | Status: DISCONTINUED | OUTPATIENT
Start: 2022-10-15 | End: 2022-10-21

## 2022-10-15 RX ORDER — FUROSEMIDE 40 MG
40 TABLET ORAL EVERY 12 HOURS
Refills: 0 | Status: DISCONTINUED | OUTPATIENT
Start: 2022-10-15 | End: 2022-10-16

## 2022-10-15 RX ORDER — HEPARIN SODIUM 5000 [USP'U]/ML
5000 INJECTION INTRAVENOUS; SUBCUTANEOUS EVERY 12 HOURS
Refills: 0 | Status: DISCONTINUED | OUTPATIENT
Start: 2022-10-15 | End: 2022-10-19

## 2022-10-15 RX ORDER — DEXTROSE 50 % IN WATER 50 %
25 SYRINGE (ML) INTRAVENOUS ONCE
Refills: 0 | Status: DISCONTINUED | OUTPATIENT
Start: 2022-10-15 | End: 2022-10-21

## 2022-10-15 RX ORDER — CEFEPIME 1 G/1
1000 INJECTION, POWDER, FOR SOLUTION INTRAMUSCULAR; INTRAVENOUS DAILY
Refills: 0 | Status: DISCONTINUED | OUTPATIENT
Start: 2022-10-15 | End: 2022-10-15

## 2022-10-15 RX ORDER — MEROPENEM 1 G/30ML
500 INJECTION INTRAVENOUS EVERY 24 HOURS
Refills: 0 | Status: DISCONTINUED | OUTPATIENT
Start: 2022-10-15 | End: 2022-10-21

## 2022-10-15 RX ORDER — ATORVASTATIN CALCIUM 80 MG/1
40 TABLET, FILM COATED ORAL AT BEDTIME
Refills: 0 | Status: DISCONTINUED | OUTPATIENT
Start: 2022-10-15 | End: 2022-10-21

## 2022-10-15 RX ORDER — METOPROLOL TARTRATE 50 MG
50 TABLET ORAL
Refills: 0 | Status: DISCONTINUED | OUTPATIENT
Start: 2022-10-15 | End: 2022-10-21

## 2022-10-15 RX ORDER — CLOPIDOGREL BISULFATE 75 MG/1
75 TABLET, FILM COATED ORAL DAILY
Refills: 0 | Status: DISCONTINUED | OUTPATIENT
Start: 2022-10-15 | End: 2022-10-16

## 2022-10-15 RX ORDER — SODIUM CHLORIDE 9 MG/ML
1000 INJECTION, SOLUTION INTRAVENOUS
Refills: 0 | Status: DISCONTINUED | OUTPATIENT
Start: 2022-10-15 | End: 2022-10-21

## 2022-10-15 RX ORDER — LANOLIN ALCOHOL/MO/W.PET/CERES
3 CREAM (GRAM) TOPICAL AT BEDTIME
Refills: 0 | Status: DISCONTINUED | OUTPATIENT
Start: 2022-10-15 | End: 2022-10-21

## 2022-10-15 RX ORDER — INFLUENZA VIRUS VACCINE 15; 15; 15; 15 UG/.5ML; UG/.5ML; UG/.5ML; UG/.5ML
0.5 SUSPENSION INTRAMUSCULAR ONCE
Refills: 0 | Status: DISCONTINUED | OUTPATIENT
Start: 2022-10-15 | End: 2022-11-08

## 2022-10-15 RX ORDER — GLUCAGON INJECTION, SOLUTION 0.5 MG/.1ML
1 INJECTION, SOLUTION SUBCUTANEOUS ONCE
Refills: 0 | Status: DISCONTINUED | OUTPATIENT
Start: 2022-10-15 | End: 2022-10-21

## 2022-10-15 RX ORDER — ACETAMINOPHEN 500 MG
650 TABLET ORAL EVERY 6 HOURS
Refills: 0 | Status: DISCONTINUED | OUTPATIENT
Start: 2022-10-15 | End: 2022-10-21

## 2022-10-15 RX ADMIN — Medication 8: at 17:40

## 2022-10-15 RX ADMIN — ATORVASTATIN CALCIUM 40 MILLIGRAM(S): 80 TABLET, FILM COATED ORAL at 21:48

## 2022-10-15 RX ADMIN — HEPARIN SODIUM 5000 UNIT(S): 5000 INJECTION INTRAVENOUS; SUBCUTANEOUS at 17:39

## 2022-10-15 RX ADMIN — SEVELAMER CARBONATE 800 MILLIGRAM(S): 2400 POWDER, FOR SUSPENSION ORAL at 05:20

## 2022-10-15 RX ADMIN — SEVELAMER CARBONATE 800 MILLIGRAM(S): 2400 POWDER, FOR SUSPENSION ORAL at 14:09

## 2022-10-15 RX ADMIN — Medication 50 MILLIGRAM(S): at 17:40

## 2022-10-15 RX ADMIN — Medication 81 MILLIGRAM(S): at 12:22

## 2022-10-15 RX ADMIN — Medication 50 MILLIGRAM(S): at 05:21

## 2022-10-15 RX ADMIN — Medication 40 MILLIGRAM(S): at 05:21

## 2022-10-15 RX ADMIN — SEVELAMER CARBONATE 800 MILLIGRAM(S): 2400 POWDER, FOR SUSPENSION ORAL at 21:48

## 2022-10-15 RX ADMIN — Medication 40 MILLIGRAM(S): at 17:42

## 2022-10-15 RX ADMIN — HEPARIN SODIUM 5000 UNIT(S): 5000 INJECTION INTRAVENOUS; SUBCUTANEOUS at 05:21

## 2022-10-15 RX ADMIN — INSULIN GLARGINE 20 UNIT(S): 100 INJECTION, SOLUTION SUBCUTANEOUS at 21:48

## 2022-10-15 RX ADMIN — MEROPENEM 100 MILLIGRAM(S): 1 INJECTION INTRAVENOUS at 05:20

## 2022-10-15 RX ADMIN — CLOPIDOGREL BISULFATE 75 MILLIGRAM(S): 75 TABLET, FILM COATED ORAL at 12:22

## 2022-10-15 NOTE — H&P ADULT - NSHPSOCIALHISTORY_GEN_ALL_CORE
Marital Status: Never   Living Situation: Lives alone  Occupation: Retired  Tobacco Use: Denied  Alcohol Use: Denied  Drug Use: Denied

## 2022-10-15 NOTE — PATIENT PROFILE ADULT - FALL HARM RISK - HARM RISK INTERVENTIONS

## 2022-10-15 NOTE — H&P ADULT - NSICDXPASTMEDICALHX_GEN_ALL_CORE_FT
PAST MEDICAL HISTORY:  BPH (benign prostatic hyperplasia)     CAD (coronary artery disease) 1 stent 2008    Chronic anemia     Chronic kidney disease (CKD) Stage IV    Diabetes mellitus     HLD (hyperlipidemia)     [place] (hard of hearing)     Hypertension     Myocardial infarction 2012    OA (osteoarthritis)     PAD (peripheral artery disease) S/p bypass left leg    Pain in left knee s/p fall    S/P CABG (coronary artery bypass graft) x4    Stented coronary artery in 2008    Transient ischemic attack (TIA) 2017; 2008

## 2022-10-15 NOTE — H&P ADULT - NSHPPHYSICALEXAM_GEN_ALL_CORE
T(C): 37.8 (10-14-22 @ 23:32), Max: 37.8 (10-14-22 @ 21:08)  HR: 120 (10-14-22 @ 23:32) (120 - 128)  BP: 109/59 (10-14-22 @ 23:32) (109/59 - 115/62)  RR: 20 (10-14-22 @ 23:32) (18 - 20)  SpO2: 98% (10-14-22 @ 23:32) (94% - 98%)    CONSTITUTIONAL: Well groomed, no apparent distress  EYES: PERRLA and symmetric, EOMI, No conjunctival or scleral injection, non-icteric  ENMT: Oral mucosa with moist membranes.   NECK: Supple, symmetric and without tracheal deviation   RESP: No respiratory distress, no use of accessory muscles;   CV: RRR, +S1S2, no MRG; no JVD; no peripheral edema  GI: Soft, NT, ND, no rebound, no guarding; no palpable masses  SKIN: No rashes or ulcers noted; Non-tender, inflamed (warmer than R) distal 1/2 of LLG. L Calcaneal ulcer on LL. Abrasions on Lateral malleolus R foot.   NEURO: CN II-XII intact;  decreases sensation in  lower extremities  PSYCH: Appropriate insight/judgment; A+O x 3, mood and affect appropriate, recent/remote memory intact

## 2022-10-15 NOTE — CONSULT NOTE ADULT - SUBJECTIVE AND OBJECTIVE BOX
Podiatry Consult Note    Subjective:  SCHWABACHER, LAWRENCE is a pleasant well-nourished, well developed 62y Male in no acute distress, alert awake, and oriented to person, place and time.   Patient is a 62y old  Male who presents with a chief complaint of Sepsis (15 Oct 2022 08:14). Pt seen & evaluated at bedside. Podiatry consulted for Left foot wound. Pt states he was brought by EMS to [facility] instead of [facility]; waited 4d before transfer to [facility]. Pt reports mild pain to Left foot. Denies numbness/tingling. Denies N/V/F/C. Sees Dr. Pacheco for foot care. No other pedal complaints. Dressings D/C/I.     HPI:     61 y/o with a PMHx of DM, HTN, ESRD (on dialysis M/W/F, last session on 10/14), CAD s/p 1 stent on 2008 and quadruple CABG on 2012, PAD s/p bilateral angioplasty at [facility], was transferred from [facility] complaining of weakness and unhealed wound in the left foot.    Two months ago the patient inadvertently stepped on glass while cleaning his house injuring his left foot. His podiatrist removed glass fragments and sent him home on a week long course of antibiotics (patient unsure about which ABx). According to the patient the wound failed to improve, he progressively developed weakness on his legs, and recurring vomiting, which prompted him to go to [facility]'s ED this last Tuesday 10/11. At [facility] patient was found to be septic and was started on IV Vancomycin 1000mg and Meropenem 500mg. Blood cultures grew ESBL E.Coli. An MRI of the foot showed "osteomyelitis of the calcaneus bone, with what appears to be a subacute fracture". Patient was evaluated by vascular surgery and podiatry at [facility], who recommended debriding the L foot wound. Patient requested to be transferred to [facility] where his vascular surgeons are. Patient's DC paperwork available in his chart.    On arrival:  /59 ;  ; T 100.1F ; O2 98% on 2L NC. Patient reported feeling more lethargic than usual, but denied LE pain, SOB, chest pain, or any other subjective complaints.  (15 Oct 2022 01:23)      Past Medical History and Surgical History  PAST MEDICAL & SURGICAL HISTORY:  CAD (coronary artery disease)  1 stent 2008      S/P CABG (coronary artery bypass graft)  x4      Diabetes mellitus      Transient ischemic attack (TIA)  2017; 2008      Chronic kidney disease (CKD)  Stage IV      Hypertension      Stented coronary artery  in 2008      Myocardial infarction  2012      PAD (peripheral artery disease)  S/p bypass left leg      HLD (hyperlipidemia)      BPH (benign prostatic hyperplasia)      Pain in left knee  s/p fall      OA (osteoarthritis)      [place] (hard of hearing)      Chronic anemia      S/P CABG (coronary artery bypass graft)  2012      H/O arterial bypass of lower limb  Left Lower Extremity (2016)      History of surgery  Left CEA (2017)  Left Pinkie toe Amputation (2014)  CABG x 4 (2012)  Card cath - stent (2008)        AV fistula  2019  LEFT AV FISTULA      Objective:  Vital Signs Last 24 Hrs  T(C): 36.9 (15 Oct 2022 04:00), Max: 37.8 (14 Oct 2022 21:08)  T(F): 98.4 (15 Oct 2022 04:00), Max: 100.1 (14 Oct 2022 21:08)  HR: 93 (15 Oct 2022 04:00) (93 - 128)  BP: 127/56 (15 Oct 2022 04:00) (109/59 - 127/56)  BP(mean): --  RR: 18 (15 Oct 2022 04:00) (18 - 20)  SpO2: 98% (14 Oct 2022 23:32) (94% - 98%)    Parameters below as of 14 Oct 2022 23:32  Patient On (Oxygen Delivery Method): nasal cannula  O2 Flow (L/min): 2                  -----------------------------    ACC: [id number] EXAM: DUPLEX LOW ARTERIES COMP BILAT  PROCEDURE DATE: 09/15/2022  INTERPRETATION: Clinical History / Reason for exam: This patient is a 62-year-old male with peripheral arterial disease. Lower extremity arterial duplex ultrasound was performed to assess for arterial occlusive disease.  Bilateral common femoral, deep femoral, superficial femoral, popliteal, anterior tibial, posterior tibial and peroneal arteries were visualized. Mild to moderate peripheral arterial disease noted in the distal right popliteal artery.  Impression:  Mild to moderate peripheral arterial disease noted in the distal right popliteal artery. Otherwise negative for flow-limiting stenosis.  ICD-10:I73.89  --- End of Report ---  REAL WONG MD; Vascular Fellow  This document has been electronically signed.  CEASAR GARY MD; Attending Vascular Surgeon  This document has been electronically signed. Sep 16 2022 1:40PM    ----------------------------    Physical Exam - Lower Extremity Focused:   #Left Lower Extremity  Derm:   Open Left Plantar Ulcer @ Plantar Medial Rearfoot;    -Wound Base Fibrogranular; 50% Fibrous, 50% Granular;    -Wound Probes to Bone w/ Mild Serosanguinous Drainage;    -Wound Margins Irregular;  Skin to Plantar Heel Dry, Dark, Hard; Boggy to w/Palpation  Skin LLE Dry, Scaly;   Mild Cellulitis w/ Erythema of Left Lower Extremity;    Vascular: DP and PT Pulses Diminished; Foot is Tepid to Warm to the touch; Capillary Refill Delayed to the Toes;  Neuro: Protective Sensation Diminished / Moderately Neuropathic   MSK: Mild Charcot Deformity Left Foot; No Pain On Palpation at Wound Site     Assessment:  Left Plantar Rearfoot Ulcer - Probes to Bone  Dry Gangrene Left Plantar Heel  Cellulitic Left Lower Extremity     Plan:  Chart reviewed and Patient evaluated. All Questions and Concerns Addressed and Answered  Discussed diagnosis and treatment with patient  Wound Flushed w/ NS; Wound Packed w/ Iodoform; Dressed w/Betadine-soaked Gauze / DSD / Kerlix   Local Wound Care; As Stated Above   Will Obtain Wound Culture;  XR Imaging Left Foot; Pending Read;  MR Imaging LE Joint Left; Ordered; will f/u;  Lower Extremity Arterial Duplex B/L from 9/15/22; Mild to moderate peripheral arterial disease noted in the distal right popliteal artery; See Full Report Above;     -Vascular Consult Active; will f/u;  ESR/CRP;   ID Consult Active; WBC; 12.54; will f/u Recs;  Possible Surgical Debridement; Pending Vascular Recs; ESR/CRP, and XR Imaging  Request Medical Clearance;  Request pre-lab optimization & OR stratification prior to sx;  Discussed Plan w/ Attending;    Podiatry        Podiatry Consult Note    Subjective:  SCHWABACHER, LAWRENCE is a pleasant well-nourished, well developed 62y Male in no acute distress, alert awake, and oriented to person, place and time.   Patient is a 62y old  Male who presents with a chief complaint of Sepsis (15 Oct 2022 08:14). Pt seen & evaluated at bedside. Podiatry consulted for Left foot wound. Pt states he was brought by EMS to [facility] instead of [facility]; waited 4d before transfer to [facility]. Pt reports mild pain to Left foot. Denies numbness/tingling. Denies N/V/F/C. Sees Dr. Pacheco for foot care. No other pedal complaints. Dressings D/C/I.     HPI:     63 y/o with a PMHx of DM, HTN, ESRD (on dialysis M/W/F, last session on 10/14), CAD s/p 1 stent on 2008 and quadruple CABG on 2012, PAD s/p bilateral angioplasty at [facility], was transferred from [facility] complaining of weakness and unhealed wound in the left foot.    Two months ago the patient inadvertently stepped on glass while cleaning his house injuring his left foot. His podiatrist removed glass fragments and sent him home on a week long course of antibiotics (patient unsure about which ABx). According to the patient the wound failed to improve, he progressively developed weakness on his legs, and recurring vomiting, which prompted him to go to [facility]'s ED this last Tuesday 10/11. At [facility] patient was found to be septic and was started on IV Vancomycin 1000mg and Meropenem 500mg. Blood cultures grew ESBL E.Coli. An MRI of the foot showed "osteomyelitis of the calcaneus bone, with what appears to be a subacute fracture". Patient was evaluated by vascular surgery and podiatry at [facility], who recommended debriding the L foot wound. Patient requested to be transferred to [facility] where his vascular surgeons are. Patient's DC paperwork available in his chart.    On arrival:  /59 ;  ; T 100.1F ; O2 98% on 2L NC. Patient reported feeling more lethargic than usual, but denied LE pain, SOB, chest pain, or any other subjective complaints.  (15 Oct 2022 01:23)      Past Medical History and Surgical History  PAST MEDICAL & SURGICAL HISTORY:  CAD (coronary artery disease)  1 stent 2008      S/P CABG (coronary artery bypass graft)  x4      Diabetes mellitus      Transient ischemic attack (TIA)  2017; 2008      Chronic kidney disease (CKD)  Stage IV      Hypertension      Stented coronary artery  in 2008      Myocardial infarction  2012      PAD (peripheral artery disease)  S/p bypass left leg      HLD (hyperlipidemia)      BPH (benign prostatic hyperplasia)      Pain in left knee  s/p fall      OA (osteoarthritis)      [place] (hard of hearing)      Chronic anemia      S/P CABG (coronary artery bypass graft)  2012      H/O arterial bypass of lower limb  Left Lower Extremity (2016)      History of surgery  Left CEA (2017)  Left Pinkie toe Amputation (2014)  CABG x 4 (2012)  Card cath - stent (2008)        AV fistula  2019  LEFT AV FISTULA      Objective:  Vital Signs Last 24 Hrs  T(C): 36.9 (15 Oct 2022 04:00), Max: 37.8 (14 Oct 2022 21:08)  T(F): 98.4 (15 Oct 2022 04:00), Max: 100.1 (14 Oct 2022 21:08)  HR: 93 (15 Oct 2022 04:00) (93 - 128)  BP: 127/56 (15 Oct 2022 04:00) (109/59 - 127/56)  BP(mean): --  RR: 18 (15 Oct 2022 04:00) (18 - 20)  SpO2: 98% (14 Oct 2022 23:32) (94% - 98%)    Parameters below as of 14 Oct 2022 23:32  Patient On (Oxygen Delivery Method): nasal cannula  O2 Flow (L/min): 2                  -----------------------------    ACC: [id number] EXAM: DUPLEX LOW ARTERIES COMP BILAT  PROCEDURE DATE: 09/15/2022  INTERPRETATION: Clinical History / Reason for exam: This patient is a 62-year-old male with peripheral arterial disease. Lower extremity arterial duplex ultrasound was performed to assess for arterial occlusive disease.  Bilateral common femoral, deep femoral, superficial femoral, popliteal, anterior tibial, posterior tibial and peroneal arteries were visualized. Mild to moderate peripheral arterial disease noted in the distal right popliteal artery.  Impression:  Mild to moderate peripheral arterial disease noted in the distal right popliteal artery. Otherwise negative for flow-limiting stenosis.  ICD-10:I73.89  --- End of Report ---  REAL WONG MD; Vascular Fellow  This document has been electronically signed.  CEASAR GARY MD; Attending Vascular Surgeon  This document has been electronically signed. Sep 16 2022 1:40PM    ----------------------------    Physical Exam - Lower Extremity Focused:   #Left Lower Extremity  Derm:   Open Left Plantar Ulcer @ Plantar Medial Rearfoot;    -Wound Base Fibrogranular; 50% Fibrous, 50% Granular;    -Wound Probes to Bone w/ Mild Serosanguinous Drainage;    -Wound Margins Irregular;  Skin to Plantar Heel Dry, Dark, Hard; Boggy to w/Palpation  Skin LLE Dry, Scaly;   Mild Cellulitis w/ Erythema of Left Lower Extremity;    Vascular: DP and PT Pulses Diminished; Foot is Tepid to Warm to the touch; Capillary Refill Delayed to the Toes;  Neuro: Protective Sensation Diminished / Moderately Neuropathic   MSK: Mild Charcot Deformity Left Foot; No Pain On Palpation at Wound Site     Assessment:  Left Plantar Rearfoot Ulcer - Probes to Bone  Dry Gangrene Left Plantar Heel  Cellulitic Left Lower Extremity     Plan:  Chart reviewed and Patient evaluated. All Questions and Concerns Addressed and Answered  Discussed diagnosis and treatment with patient  Wound Flushed w/ NS; Wound Packed w/ Iodoform; Dressed w/Betadine-soaked Gauze / DSD / Kerlix   Local Wound Care; As Stated Above   Will Obtain Wound Culture;  XR Imaging Left Foot; Pending Read;  MR Imaging LE Joint Left; Ordered; will f/u;  Lower Extremity Arterial Duplex B/L from 9/15/22; Mild to moderate peripheral arterial disease noted in the distal right popliteal artery; See Full Report Above;     -Vascular Consult Active; will f/u;  ESR/CRP;   ID Consult Active; WBC; 12.54; will f/u Recs;  Sx Scheduled Mon 10/14/22; Add-On #1; Excisional Debridement of Soft Tissue & Bone, Left Foot;   **Request Medical Clearance**;  Request pre-lab optimization & OR stratification prior to sx;  NPO @ [place] Sun 10/13/22;  Discussed Plan w/ Attending;    Podiatry        Podiatry Consult Note    Subjective:  SCHWABACHER, LAWRENCE is a pleasant well-nourished, well developed 62y Male in no acute distress, alert awake, and oriented to person, place and time.   Patient is a 62y old  Male who presents with a chief complaint of Sepsis (15 Oct 2022 08:14). Pt seen & evaluated at bedside. Podiatry consulted for Left foot wound. Pt states he was brought by EMS to [facility] instead of [facility]; waited 4d before transfer to [facility]. Pt reports mild pain to Left foot. Denies numbness/tingling. Denies N/V/F/C. Sees Dr. Pacheco for foot care. No other pedal complaints. Dressings D/C/I.     HPI:     63 y/o with a PMHx of DM, HTN, ESRD (on dialysis M/W/F, last session on 10/14), CAD s/p 1 stent on 2008 and quadruple CABG on 2012, PAD s/p bilateral angioplasty at [facility], was transferred from [facility] complaining of weakness and unhealed wound in the left foot.    Two months ago the patient inadvertently stepped on glass while cleaning his house injuring his left foot. His podiatrist removed glass fragments and sent him home on a week long course of antibiotics (patient unsure about which ABx). According to the patient the wound failed to improve, he progressively developed weakness on his legs, and recurring vomiting, which prompted him to go to [facility]'s ED this last Tuesday 10/11. At [facility] patient was found to be septic and was started on IV Vancomycin 1000mg and Meropenem 500mg. Blood cultures grew ESBL E.Coli. An MRI of the foot showed "osteomyelitis of the calcaneus bone, with what appears to be a subacute fracture". Patient was evaluated by vascular surgery and podiatry at [facility], who recommended debriding the L foot wound. Patient requested to be transferred to [facility] where his vascular surgeons are. Patient's DC paperwork available in his chart.    On arrival:  /59 ;  ; T 100.1F ; O2 98% on 2L NC. Patient reported feeling more lethargic than usual, but denied LE pain, SOB, chest pain, or any other subjective complaints.  (15 Oct 2022 01:23)      Past Medical History and Surgical History  PAST MEDICAL & SURGICAL HISTORY:  CAD (coronary artery disease)  1 stent 2008      S/P CABG (coronary artery bypass graft)  x4      Diabetes mellitus      Transient ischemic attack (TIA)  2017; 2008      Chronic kidney disease (CKD)  Stage IV      Hypertension      Stented coronary artery  in 2008      Myocardial infarction  2012      PAD (peripheral artery disease)  S/p bypass left leg      HLD (hyperlipidemia)      BPH (benign prostatic hyperplasia)      Pain in left knee  s/p fall      OA (osteoarthritis)      [place] (hard of hearing)      Chronic anemia      S/P CABG (coronary artery bypass graft)  2012      H/O arterial bypass of lower limb  Left Lower Extremity (2016)      History of surgery  Left CEA (2017)  Left Pinkie toe Amputation (2014)  CABG x 4 (2012)  Card cath - stent (2008)        AV fistula  2019  LEFT AV FISTULA      Objective:  Vital Signs Last 24 Hrs  T(C): 36.9 (15 Oct 2022 04:00), Max: 37.8 (14 Oct 2022 21:08)  T(F): 98.4 (15 Oct 2022 04:00), Max: 100.1 (14 Oct 2022 21:08)  HR: 93 (15 Oct 2022 04:00) (93 - 128)  BP: 127/56 (15 Oct 2022 04:00) (109/59 - 127/56)  BP(mean): --  RR: 18 (15 Oct 2022 04:00) (18 - 20)  SpO2: 98% (14 Oct 2022 23:32) (94% - 98%)    Parameters below as of 14 Oct 2022 23:32  Patient On (Oxygen Delivery Method): nasal cannula  O2 Flow (L/min): 2                  -----------------------------    ACC: [id number] EXAM: DUPLEX LOW ARTERIES COMP BILAT  PROCEDURE DATE: 09/15/2022  INTERPRETATION: Clinical History / Reason for exam: This patient is a 62-year-old male with peripheral arterial disease. Lower extremity arterial duplex ultrasound was performed to assess for arterial occlusive disease.  Bilateral common femoral, deep femoral, superficial femoral, popliteal, anterior tibial, posterior tibial and peroneal arteries were visualized. Mild to moderate peripheral arterial disease noted in the distal right popliteal artery.  Impression:  Mild to moderate peripheral arterial disease noted in the distal right popliteal artery. Otherwise negative for flow-limiting stenosis.  ICD-10:I73.89  --- End of Report ---  REAL WONG MD; Vascular Fellow  This document has been electronically signed.  CEASAR GARY MD; Attending Vascular Surgeon  This document has been electronically signed. Sep 16 2022 1:40PM    ----------------------------    Physical Exam - Lower Extremity Focused:   #Left Lower Extremity  Derm:   Open Left Plantar Ulcer @ Plantar Medial Rearfoot;    -Wound Base Fibrogranular; 50% Fibrous, 50% Granular;    -Wound Probes to Bone w/ Mild Serosanguinous Drainage;    -Wound Margins Irregular;  Skin to Plantar Heel Dry, Dark, Hard; Boggy to w/Palpation  Skin LLE Dry, Scaly;   Mild Cellulitis w/ Erythema of Left Lower Extremity;    Vascular: DP and PT Pulses Diminished; Foot is Tepid to Warm to the touch; Capillary Refill Delayed to the Toes;  Neuro: Protective Sensation Diminished / Moderately Neuropathic   MSK: Mild Charcot Deformity Left Foot; No Pain On Palpation at Wound Site     Assessment:  Left Plantar Rearfoot Ulcer - Probes to Bone  Dry Gangrene Left Plantar Heel  Cellulitic Left Lower Extremity     Plan:  Chart reviewed and Patient evaluated. All Questions and Concerns Addressed and Answered  Discussed diagnosis and treatment with patient  Wound Flushed w/ NS; Wound Packed w/ Iodoform; Dressed w/Betadine-soaked Gauze / DSD / Kerlix   Local Wound Care; As Stated Above   Will Obtain Wound Culture;  XR Imaging Left Foot; Pending Read;  MR Imaging LE Joint Left; Ordered; will f/u;  Lower Extremity Arterial Duplex B/L from 9/15/22; Mild to moderate peripheral arterial disease noted in the distal right popliteal artery; See Full Report Above;     -Vascular Consult Active; will f/u;  ESR/CRP;   ID Consult Active; WBC; 12.54; will f/u Recs;  Sx Scheduled Mon 10/17/22; Add-On #1; Excisional Debridement of Soft Tissue & Bone, Left Foot;   **Request Medical Clearance**;  Request pre-lab optimization & OR stratification prior to sx;  NPO @ [place] Sun 10/16/22;  Discussed Plan w/ Attending;    Podiatry

## 2022-10-15 NOTE — PROGRESS NOTE ADULT - ASSESSMENT
· Assessment	   61 y/o with a PMHx of DM, HTN, ESRD (on dialysis M/W/F, last session on 10/14), CAD s/p 1 stent on 2008 and quadruple CABG on 2012, PAD s/p bilateral angioplasty at [facility], was transferred from [facility] complaining of weakness and unhealed wound in the left foot.    #Left Foot wound, likely OM  - Podiatry consult  - ID consult appreciated  - Blood cultures  - X-ray L foot  - MRI L foot  - Wound cx from 9/16 positive for Enterobacter cloacae, patient was treated with 2x weeks Post-HD Vanc + cefepime   - ESBL E coli bacteremia from [facility] cultures  - Per ID - c/w IV Meropenem 500mg  - Podiatry consult appreciated, pending possible surgical intervention after vascular sees patient  - Will need medical clearance and risk stratification for OR    #Leg weakness likely secondary to PAD  - vascular surgery consult pending    #ESRD  - Last dialysis on Saturday (10/14)  - Nephro consult     #CAD  - c/w statin  - c/w ASA 81mg  - c/W plavix   - c/w Lopressor 50mg  - c/w Lasix 40mg PO BID    #DM  - Obtain height/weight   - start Insulin sliding scale   - Pt takes 36 units of Lispro at home and trulicity 1/annette    #MISC  - Diet: DASH  - Activity: Ambulate as tolerated   - DVT: Heparin

## 2022-10-15 NOTE — CONSULT NOTE ADULT - SUBJECTIVE AND OBJECTIVE BOX
SCHWABACHER, LAWRENCE  62y, Male  Allergy: No Known Allergies      CHIEF COMPLAINT: Sepsis (15 Oct 2022 01:23)      LOS  1d    HPI:     63 y/o with a PMHx of DM, HTN, ESRD (on dialysis M/W/F, last session on 10/14), CAD s/p 1 stent on 2008 and quadruple CABG on 2012, PAD s/p bilateral angioplasty at [facility], was transferred from [facility] complaining of weakness and unhealed wound in the left foot.    Two months ago the patient inadvertently stepped on glass while cleaning his house injuring his left foot. His podiatrist removed glass fragments and sent him home on a week long course of antibiotics (patient unsure about which ABx). According to the patient the wound failed to improve, he progressively developed weakness on his legs, and recurring vomiting, which prompted him to go to [facility]'s ED this last Tuesday 10/11. At [facility] patient was found to be septic and was started on IV Vancomycin 1000mg and Meropenem 500mg. Blood cultures grew ESBL E.Coli. An MRI of the foot showed "osteomyelitis of the calcaneus bone, with what appears to be a subacute fracture". Patient was evaluated by vascular surgery and podiatry at [facility], who recommended debriding the L foot wound. Patient requested to be transferred to [facility] where his vascular surgeons are. Patient's DC paperwork available in his chart.    On arrival:  /59 ;  ; T 100.1F ; O2 98% on 2L NC. Patient reported feeling more lethargic than usual, but denied LE pain, SOB, chest pain, or any other subjective complaints.  (15 Oct 2022 01:23)      INFECTIOUS DISEASE HISTORY:  History as above.      PAST MEDICAL & SURGICAL HISTORY:  CAD (coronary artery disease)  1 stent 2008      S/P CABG (coronary artery bypass graft)  x4      Diabetes mellitus      Transient ischemic attack (TIA)  2017; 2008      Chronic kidney disease (CKD)  Stage IV      Hypertension      Stented coronary artery  in 2008      Myocardial infarction  2012      PAD (peripheral artery disease)  S/p bypass left leg      HLD (hyperlipidemia)      BPH (benign prostatic hyperplasia)      Pain in left knee  s/p fall      OA (osteoarthritis)      [place] (hard of hearing)      Chronic anemia      S/P CABG (coronary artery bypass graft)  2012      H/O arterial bypass of lower limb  Left Lower Extremity (2016)      History of surgery  Left CEA (2017)  Left Pinkie toe Amputation (2014)  CABG x 4 (2012)  Card cath - stent (2008)        AV fistula  2019  LEFT AV FISTULA          FAMILY HISTORY  Family history of heart disease (Father)    DM (diabetes mellitus) (Mother)    ESRD (end stage renal disease) on dialysis (Mother)        SOCIAL HISTORY  Social History:  Marital Status: Never   Living Situation: Lives alone  Occupation: Retired  Tobacco Use: Denied  Alcohol Use: Denied  Drug Use: Denied (15 Oct 2022 01:23)        ROS  General: Denies rigors, nightsweats  HEENT: Denies headache, rhinorrhea, sore throat, eye pain  CV: Denies CP, palpitations  PULM: Denies wheezing, hemoptysis  GI: Denies hematemesis, hematochezia, melena  : Denies discharge, hematuria  MSK: Denies arthralgias, myalgias  SKIN: Denies rash, lesions  NEURO: Denies paresthesias, weakness  PSYCH: Denies depression, anxiety    VITALS:  T(F): 98.4, Max: 100.1 (10-14-22 @ 21:08)  HR: 93  BP: 127/56  RR: 18Vital Signs Last 24 Hrs  T(C): 36.9 (15 Oct 2022 04:00), Max: 37.8 (14 Oct 2022 21:08)  T(F): 98.4 (15 Oct 2022 04:00), Max: 100.1 (14 Oct 2022 21:08)  HR: 93 (15 Oct 2022 04:00) (93 - 128)  BP: 127/56 (15 Oct 2022 04:00) (109/59 - 127/56)  BP(mean): --  RR: 18 (15 Oct 2022 04:00) (18 - 20)  SpO2: 98% (14 Oct 2022 23:32) (94% - 98%)    Parameters below as of 14 Oct 2022 23:32  Patient On (Oxygen Delivery Method): nasal cannula  O2 Flow (L/min): 2      PHYSICAL EXAM:  Gen: NAD, resting in bed  HEENT: Normocephalic, atraumatic  Neck: supple, no lymphadenopathy  CV: Regular rate & regular rhythm  Lungs: decreased BS at bases, no fremitus  Abdomen: Soft, BS present  Ext: Warm, well perfused  Neuro: non focal, awake  Skin: Left Calcaneous with large necrotic ulcer with   Lines: no phlebitis    TESTS & MEASUREMENTS:                  Culture - Abscess with Gram Stain (collected 09-18-22 @ 10:00)  Source: .Abscess foot left  Final Report (09-23-22 @ 16:29):    Rare Enterobacter cloacae complex  Organism: Enterobacter cloacae complex (09-23-22 @ 16:29)  Organism: Enterobacter cloacae complex (09-23-22 @ 16:29)      -  Amikacin: S <=16      -  Amoxicillin/Clavulanic Acid: R >16/8      -  Ampicillin: R >16 These ampicillin results predict results for amoxicillin      -  Ampicillin/Sulbactam: R 8/4 Enterobacter, Klebsiella aerogenes, Citrobacter, and Serratia may develop resistance during prolonged therapy (3-4 days)      -  Aztreonam: S <=4      -  Cefazolin: R >16 Enterobacter, Klebsiella aerogenes, Citrobacter, and Serratia may develop resistance during prolonged therapy (3-4 days)      -  Cefepime: S <=2      -  Cefoxitin: R >16      -  Ceftriaxone: S <=1 Enterobacter, Klebsiella aerogenes, Citrobacter, and Serratia may develop resistance during prolonged therapy      -  Ciprofloxacin: S <=0.25      -  Ertapenem: S <=0.5      -  Gentamicin: S <=2      -  Imipenem: S <=1      -  Levofloxacin: S <=0.5      -  Meropenem: S <=1      -  Piperacillin/Tazobactam: S <=8      -  Tobramycin: S <=2      -  Trimethoprim/Sulfamethoxazole: S 1/19      Method Type: CONNER    Culture - Blood (collected 09-14-22 @ 20:10)  Source: .Blood Blood  Final Report (09-20-22 @ 02:00):    No Growth Final    Culture - Blood (collected 09-14-22 @ 17:50)  Source: .Blood Blood-Peripheral  Final Report (09-20-22 @ 01:00):    No Growth Final    Culture - Abscess with Gram Stain (collected 02-26-22 @ 10:42)  Source: .Abscess r heel  Final Report (02-28-22 @ 21:46):    Culture yields >4 types of aerobic and/or anaerobic bacteria    Call client services within 7 days if further workup is clinically    indicated. including    Moderate Escherichia coli    Moderate Enterobacter cloacae complex    Moderate Enterococcus faecalis    Rare Staphylococcus simulans "Susceptibilities not performed"    Numerous Corynebacterium species "Susceptibilities not performed"    Numerous Prevotella disiens "Susceptibilities not performed"  Organism: Escherichia coli  Enterococcus faecalis (02-28-22 @ 21:42)  Organism: Enterococcus faecalis (02-28-22 @ 21:42)      -  Ampicillin: S <=2 Predicts results to ampicillin/sulbactam, amoxacillin-clavulanate and  piperacillin-tazobactam.      -  Tetra/Doxy: R >8      -  Vancomycin: S 1      Method Type: CONNER  Organism: Escherichia coli (02-28-22 @ 21:42)      -  Amikacin: S <=16      -  Amoxicillin/Clavulanic Acid: S <=8/4      -  Ampicillin: S <=8 These ampicillin results predict results for amoxicillin      -  Ampicillin/Sulbactam: S <=4/2 Enterobacter, Klebsiella aerogenes, Citrobacter, and Serratia may develop resistance during prolonged therapy (3-4 days)      -  Aztreonam: S <=4      -  Cefazolin: S <=2 Enterobacter, Klebsiella aerogenes, Citrobacter, and Serratia may develop resistance during prolonged therapy (3-4 days)      -  Cefepime: S <=2      -  Cefoxitin: S <=8      -  Ceftriaxone: S <=1 Enterobacter, Klebsiella aerogenes, Citrobacter, and Serratia may develop resistance during prolonged therapy      -  Ciprofloxacin: S <=0.25      -  Ertapenem: S <=0.5      -  Gentamicin: S <=2      -  Imipenem: S <=1      -  Levofloxacin: S <=0.5      -  Meropenem: S <=1      -  Piperacillin/Tazobactam: S <=8      -  Tobramycin: S <=2      -  Trimethoprim/Sulfamethoxazole: S <=0.5/9.5      Method Type: CONNER            INFECTIOUS DISEASES TESTING  COVID-19 PCR: Detected (09-20-22 @ 12:50)  COVID-19 PCR: NotDetec (09-14-22 @ 13:45)  COVID-19 PCR: NotDetec (09-06-22 @ 01:35)  COVID-19 PCR: NotDetec (03-02-22 @ 14:15)  COVID-19 PCR: NotDetec (02-27-22 @ 15:30)  COVID-19 PCR: NotDetec (02-23-22 @ 11:50)  COVID-19 PCR: NotDetec (02-21-22 @ 10:25)      RADIOLOGY & ADDITIONAL TESTS:  I have personally reviewed the last Chest xray  CXR      CT      CARDIOLOGY TESTING      MEDICATIONS  aspirin enteric coated 81 Oral daily  atorvastatin 40 Oral at bedtime  clopidogrel Tablet 75 Oral daily  furosemide    Tablet 40 Oral every 12 hours  heparin   Injectable 5000 SubCutaneous every 12 hours  influenza   Vaccine 0.5 IntraMuscular once  meropenem  IVPB 500 IV Intermittent every 24 hours  metoprolol tartrate 50 Oral two times a day  sevelamer carbonate 800 Oral three times a day      Weight  Weight (kg): 90.1 (10-14-22 @ 21:08)    ANTIBIOTICS:  meropenem  IVPB 500 milliGRAM(s) IV Intermittent every 24 hours      ALLERGIES:  No Known Allergies       SCHWABACHER, LAWRENCE  62y, Male  Allergy: No Known Allergies      CHIEF COMPLAINT: Sepsis (15 Oct 2022 01:23)      LOS  1d    HPI:     61 y/o with a PMHx of DM, HTN, ESRD (on dialysis M/W/F, last session on 10/14), CAD s/p 1 stent on 2008 and quadruple CABG on 2012, PAD s/p bilateral angioplasty at [facility], was transferred from [facility] complaining of weakness and unhealed wound in the left foot.    Two months ago the patient inadvertently stepped on glass while cleaning his house injuring his left foot. His podiatrist removed glass fragments and sent him home on a week long course of antibiotics (patient unsure about which ABx). According to the patient the wound failed to improve, he progressively developed weakness on his legs, and recurring vomiting, which prompted him to go to [facility]'s ED this last Tuesday 10/11. At [facility] patient was found to be septic and was started on IV Vancomycin 1000mg and Meropenem 500mg. Blood cultures grew ESBL E.Coli. An MRI of the foot showed "osteomyelitis of the calcaneus bone, with what appears to be a subacute fracture". Patient was evaluated by vascular surgery and podiatry at [facility], who recommended debriding the L foot wound. Patient requested to be transferred to [facility] where his vascular surgeons are. Patient's DC paperwork available in his chart.    On arrival:  /59 ;  ; T 100.1F ; O2 98% on 2L NC. Patient reported feeling more lethargic than usual, but denied LE pain, SOB, chest pain, or any other subjective complaints.  (15 Oct 2022 01:23)      INFECTIOUS DISEASE HISTORY:  History as above.      PAST MEDICAL & SURGICAL HISTORY:  CAD (coronary artery disease)  1 stent 2008      S/P CABG (coronary artery bypass graft)  x4      Diabetes mellitus      Transient ischemic attack (TIA)  2017; 2008      Chronic kidney disease (CKD)  Stage IV      Hypertension      Stented coronary artery  in 2008      Myocardial infarction  2012      PAD (peripheral artery disease)  S/p bypass left leg      HLD (hyperlipidemia)      BPH (benign prostatic hyperplasia)      Pain in left knee  s/p fall      OA (osteoarthritis)      [place] (hard of hearing)      Chronic anemia      S/P CABG (coronary artery bypass graft)  2012      H/O arterial bypass of lower limb  Left Lower Extremity (2016)      History of surgery  Left CEA (2017)  Left Pinkie toe Amputation (2014)  CABG x 4 (2012)  Card cath - stent (2008)        AV fistula  2019  LEFT AV FISTULA          FAMILY HISTORY  Family history of heart disease (Father)    DM (diabetes mellitus) (Mother)    ESRD (end stage renal disease) on dialysis (Mother)        SOCIAL HISTORY  Social History:  Marital Status: Never   Living Situation: Lives alone  Occupation: Retired  Tobacco Use: Denied  Alcohol Use: Denied  Drug Use: Denied (15 Oct 2022 01:23)        ROS  General: Denies rigors, nightsweats  HEENT: Denies headache, rhinorrhea, sore throat, eye pain  CV: Denies CP, palpitations  PULM: Denies wheezing, hemoptysis  GI: Denies hematemesis, hematochezia, melena  : Denies discharge, hematuria  MSK: Denies arthralgias, myalgias  SKIN: Denies rash, lesions  NEURO: Denies paresthesias, weakness  PSYCH: Denies depression, anxiety    VITALS:  T(F): 98.4, Max: 100.1 (10-14-22 @ 21:08)  HR: 93  BP: 127/56  RR: 18Vital Signs Last 24 Hrs  T(C): 36.9 (15 Oct 2022 04:00), Max: 37.8 (14 Oct 2022 21:08)  T(F): 98.4 (15 Oct 2022 04:00), Max: 100.1 (14 Oct 2022 21:08)  HR: 93 (15 Oct 2022 04:00) (93 - 128)  BP: 127/56 (15 Oct 2022 04:00) (109/59 - 127/56)  BP(mean): --  RR: 18 (15 Oct 2022 04:00) (18 - 20)  SpO2: 98% (14 Oct 2022 23:32) (94% - 98%)    Parameters below as of 14 Oct 2022 23:32  Patient On (Oxygen Delivery Method): nasal cannula  O2 Flow (L/min): 2      PHYSICAL EXAM:  Gen: NAD, resting in bed  HEENT: Normocephalic, atraumatic  Neck: supple, no lymphadenopathy  CV: Regular rate & regular rhythm  Lungs: decreased BS at bases, no fremitus  Abdomen: Soft, BS present  Ext: Warm, well perfused  Neuro: non focal, awake  Skin: Left Calcaneous with large necrotic ulcer with hyperkeratotic tissue surrouding - there is an area of deep tunneling - malodorous   Lines: no phlebitis    TESTS & MEASUREMENTS:                  Culture - Abscess with Gram Stain (collected 09-18-22 @ 10:00)  Source: .Abscess foot left  Final Report (09-23-22 @ 16:29):    Rare Enterobacter cloacae complex  Organism: Enterobacter cloacae complex (09-23-22 @ 16:29)  Organism: Enterobacter cloacae complex (09-23-22 @ 16:29)      -  Amikacin: S <=16      -  Amoxicillin/Clavulanic Acid: R >16/8      -  Ampicillin: R >16 These ampicillin results predict results for amoxicillin      -  Ampicillin/Sulbactam: R 8/4 Enterobacter, Klebsiella aerogenes, Citrobacter, and Serratia may develop resistance during prolonged therapy (3-4 days)      -  Aztreonam: S <=4      -  Cefazolin: R >16 Enterobacter, Klebsiella aerogenes, Citrobacter, and Serratia may develop resistance during prolonged therapy (3-4 days)      -  Cefepime: S <=2      -  Cefoxitin: R >16      -  Ceftriaxone: S <=1 Enterobacter, Klebsiella aerogenes, Citrobacter, and Serratia may develop resistance during prolonged therapy      -  Ciprofloxacin: S <=0.25      -  Ertapenem: S <=0.5      -  Gentamicin: S <=2      -  Imipenem: S <=1      -  Levofloxacin: S <=0.5      -  Meropenem: S <=1      -  Piperacillin/Tazobactam: S <=8      -  Tobramycin: S <=2      -  Trimethoprim/Sulfamethoxazole: S 1/19      Method Type: CONNER    Culture - Blood (collected 09-14-22 @ 20:10)  Source: .Blood Blood  Final Report (09-20-22 @ 02:00):    No Growth Final    Culture - Blood (collected 09-14-22 @ 17:50)  Source: .Blood Blood-Peripheral  Final Report (09-20-22 @ 01:00):    No Growth Final    Culture - Abscess with Gram Stain (collected 02-26-22 @ 10:42)  Source: .Abscess r heel  Final Report (02-28-22 @ 21:46):    Culture yields >4 types of aerobic and/or anaerobic bacteria    Call client services within 7 days if further workup is clinically    indicated. including    Moderate Escherichia coli    Moderate Enterobacter cloacae complex    Moderate Enterococcus faecalis    Rare Staphylococcus simulans "Susceptibilities not performed"    Numerous Corynebacterium species "Susceptibilities not performed"    Numerous Prevotella disiens "Susceptibilities not performed"  Organism: Escherichia coli  Enterococcus faecalis (02-28-22 @ 21:42)  Organism: Enterococcus faecalis (02-28-22 @ 21:42)      -  Ampicillin: S <=2 Predicts results to ampicillin/sulbactam, amoxacillin-clavulanate and  piperacillin-tazobactam.      -  Tetra/Doxy: R >8      -  Vancomycin: S 1      Method Type: CONNER  Organism: Escherichia coli (02-28-22 @ 21:42)      -  Amikacin: S <=16      -  Amoxicillin/Clavulanic Acid: S <=8/4      -  Ampicillin: S <=8 These ampicillin results predict results for amoxicillin      -  Ampicillin/Sulbactam: S <=4/2 Enterobacter, Klebsiella aerogenes, Citrobacter, and Serratia may develop resistance during prolonged therapy (3-4 days)      -  Aztreonam: S <=4      -  Cefazolin: S <=2 Enterobacter, Klebsiella aerogenes, Citrobacter, and Serratia may develop resistance during prolonged therapy (3-4 days)      -  Cefepime: S <=2      -  Cefoxitin: S <=8      -  Ceftriaxone: S <=1 Enterobacter, Klebsiella aerogenes, Citrobacter, and Serratia may develop resistance during prolonged therapy      -  Ciprofloxacin: S <=0.25      -  Ertapenem: S <=0.5      -  Gentamicin: S <=2      -  Imipenem: S <=1      -  Levofloxacin: S <=0.5      -  Meropenem: S <=1      -  Piperacillin/Tazobactam: S <=8      -  Tobramycin: S <=2      -  Trimethoprim/Sulfamethoxazole: S <=0.5/9.5      Method Type: CONNER            INFECTIOUS DISEASES TESTING  COVID-19 PCR: Detected (09-20-22 @ 12:50)  COVID-19 PCR: NotDetec (09-14-22 @ 13:45)  COVID-19 PCR: NotDetec (09-06-22 @ 01:35)  COVID-19 PCR: NotDetec (03-02-22 @ 14:15)  COVID-19 PCR: NotDetec (02-27-22 @ 15:30)  COVID-19 PCR: NotDetec (02-23-22 @ 11:50)  COVID-19 PCR: NotDetec (02-21-22 @ 10:25)      RADIOLOGY & ADDITIONAL TESTS:  I have personally reviewed the last Chest xray  CXR      CT      CARDIOLOGY TESTING      MEDICATIONS  aspirin enteric coated 81 Oral daily  atorvastatin 40 Oral at bedtime  clopidogrel Tablet 75 Oral daily  furosemide    Tablet 40 Oral every 12 hours  heparin   Injectable 5000 SubCutaneous every 12 hours  influenza   Vaccine 0.5 IntraMuscular once  meropenem  IVPB 500 IV Intermittent every 24 hours  metoprolol tartrate 50 Oral two times a day  sevelamer carbonate 800 Oral three times a day      Weight  Weight (kg): 90.1 (10-14-22 @ 21:08)    ANTIBIOTICS:  meropenem  IVPB 500 milliGRAM(s) IV Intermittent every 24 hours      ALLERGIES:  No Known Allergies

## 2022-10-15 NOTE — CONSULT NOTE ADULT - ASSESSMENT
63 y/o with a PMHx of DM, HTN, ESRD (on dialysis M/W/F, last session on 10/14), CAD s/p 1 stent on 2008 and quadruple CABG on 2012, PAD s/p bilateral angioplasty at [facility], was transferred from [facility] complaining of weakness and unhealed wound in the left foot. Patient was evaluated by vascular surgery and podiatry at [facility], who recommended debriding the L foot wound. Patient requested to be transferred to [facility] where his vascular surgeons are.   # ESRD , s/p hd yesterday , hd on monday   # seen by ID on meropenem   # podiatry notes appreciated   #d/c lasix if anuric   #on binders/ check ph level   # check iron stores / RAMSEY with HD   # will follow

## 2022-10-15 NOTE — CONSULT NOTE ADULT - ASSESSMENT
ASSESSMENT  61 y/o with a PMHx of DM, HTN, ESRD (on dialysis M/W/F, last session on 10/14), CAD s/p 1 stent on 2008 and quadruple CABG on 2012, PAD s/p bilateral angioplasty at [facility], was transferred from [facility] complaining of weakness and unhealed wound in the left foot, found to have OM of calcaneus bone and ESBL E coli bacteremia.    IMPRESSION  #Left Calcaneous OM  - s/p removal of FB 9/16  - Wound Cx 9/16 - Enterobacter cloacae  - treated with two weeks post-HD vancomycin + cefepime    #ESBL E coli Bacteremia    #ESRD on HD  #CAD s/p CABG  #PAD s/p bilateral angioplasty  #DM  #Abx allergy:      RECOMMENDATIONS  This is a preliminary incomplete pended note, all final recommendations to follow after interview and examination of the patient.    Please call or message on [employer] Teams if with any questions.  Spectra [phone]       ASSESSMENT  61 y/o with a PMHx of DM, HTN, ESRD (on dialysis M/W/F, last session on 10/14), CAD s/p 1 stent on 2008 and quadruple CABG on 2012, PAD s/p bilateral angioplasty at [facility], was transferred from [facility] complaining of weakness and unhealed wound in the left foot, found to have OM of calcaneus bone and ESBL E coli bacteremia.    IMPRESSION  #Left Calcaneous OM  - s/p removal of FB 9/16  - Wound Cx 9/16 - Enterobacter cloacae  - treated with two weeks post-HD vancomycin + cefepime    #ESBL E coli Bacteremia at [facility]     #ESRD on HD  #CAD s/p CABG  #PAD s/p bilateral angioplasty  #DM  #Abx allergy:      RECOMMENDATIONS  - continue meropenem 500 mg q 24 hours   - please check [facility] for susceptibility report (specifically for Gentamicin and Amikacin) to check as option for post HD antibiotics   - will likely need at least 6 weeks for calcaneal OM   - podiatry on-board - will follow any surgical plans   - follow-up blood cx     Please call or message on [employer] Teams if with any questions.  Spectra [phone]

## 2022-10-15 NOTE — CONSULT NOTE ADULT - SUBJECTIVE AND OBJECTIVE BOX
61 y/o with a PMHx of DM, HTN, ESRD (on dialysis M/W/F, last session on 10/14), CAD s/p 1 stent on 2008 and quadruple CABG on 2012, PAD s/p bilateral angioplasty at [facility], was transferred from [facility] complaining of weakness and unhealed wound in the left foot. Patient was evaluated by vascular surgery and podiatry at [facility], who recommended debriding the L foot wound. Patient requested to be transferred to [facility] where his vascular surgeons are.   Last HD yesterday           PAST HISTORY  --------------------------------------------------------------------------------  PAST MEDICAL & SURGICAL HISTORY:  CAD (coronary artery disease)  1 stent 2008      S/P CABG (coronary artery bypass graft)  x4      Diabetes mellitus      Transient ischemic attack (TIA)  2017; 2008      Chronic kidney disease (CKD)  Stage IV      Hypertension      Stented coronary artery  in 2008      Myocardial infarction  2012      PAD (peripheral artery disease)  S/p bypass left leg      HLD (hyperlipidemia)      BPH (benign prostatic hyperplasia)      Pain in left knee  s/p fall      OA (osteoarthritis)      [place] (hard of hearing)      Chronic anemia      S/P CABG (coronary artery bypass graft)  2012      H/O arterial bypass of lower limb  Left Lower Extremity (2016)      History of surgery  Left CEA (2017)  Left Pinkie toe Amputation (2014)  CABG x 4 (2012)  Card cath - stent (2008)        AV fistula  2019  LEFT AV FISTULA        FAMILY HISTORY:  Family history of heart disease (Father)    DM (diabetes mellitus) (Mother)    ESRD (end stage renal disease) on dialysis (Mother)      PAST SOCIAL HISTORY:    ALLERGIES & MEDICATIONS  --------------------------------------------------------------------------------  Allergies    No Known Allergies    Intolerances      Standing Inpatient Medications  aspirin enteric coated 81 milliGRAM(s) Oral daily  atorvastatin 40 milliGRAM(s) Oral at bedtime  clopidogrel Tablet 75 milliGRAM(s) Oral daily  furosemide    Tablet 40 milliGRAM(s) Oral every 12 hours  heparin   Injectable 5000 Unit(s) SubCutaneous every 12 hours  influenza   Vaccine 0.5 milliLiter(s) IntraMuscular once  meropenem  IVPB 500 milliGRAM(s) IV Intermittent every 24 hours  metoprolol tartrate 50 milliGRAM(s) Oral two times a day  sevelamer carbonate 800 milliGRAM(s) Oral three times a day    PRN Inpatient Medications  acetaminophen     Tablet .. 650 milliGRAM(s) Oral every 6 hours PRN  aluminum hydroxide/magnesium hydroxide/simethicone Suspension 30 milliLiter(s) Oral every 4 hours PRN  melatonin 3 milliGRAM(s) Oral at bedtime PRN  ondansetron Injectable 4 milliGRAM(s) IV Push every 8 hours PRN            VITALS/PHYSICAL EXAM  --------------------------------------------------------------------------------  T(C): 36.9 (10-15-22 @ 04:00), Max: 37.8 (10-14-22 @ 21:08)  HR: 93 (10-15-22 @ 04:00) (93 - 128)  BP: 127/56 (10-15-22 @ 04:00) (109/59 - 127/56)  RR: 18 (10-15-22 @ 04:00) (18 - 20)  SpO2: 98% (10-14-22 @ 23:32) (94% - 98%)  Wt(kg): --    Weight (kg): 90.1 (10-14-22 @ 21:08)      10-14-22 @ 07:01  -  10-15-22 @ 07:00  --------------------------------------------------------  IN: 0 mL / OUT: 200 mL / NET: -200 mL      Physical Exam:  	Gen: NAD  	Pulm: CTA B/L  	CV: S1S2; no rub  	Abd: soft, nontender/nondistended  	Vascular access: av     LABS/STUDIES  --------------------------------------------------------------------------------              8.2    12.91 >-----------<  329      [10-15-22 @ 09:53]              25.2         PT/INR: PT 13.90, INR 1.21       [10-15-22 @ 09:53]  PTT: 30.9       [10-15-22 @ 09:53]      Creatinine Trend:  SCr 5.0 [09-20 @ 08:11]  SCr 7.0 [09-19 @ 09:26]  SCr 5.9 [09-18 @ 09:18]  SCr 5.1 [09-17 @ 08:57]  SCr 6.3 [09-16 @ 10:07]    Urinalysis - [10-21-19 @ 17:09]      Color Light Yellow / Appearance Clear / SG 1.018 / pH 6.0      Gluc 200 mg/dL / Ketone Negative  / Bili Negative / Urobili <2 mg/dL       Blood Small / Protein 300 mg/dL / Leuk Est Negative / Nitrite Negative      RBC 7 / WBC 3 / Hyaline 1 / Gran  / Sq Epi  / Non Sq Epi 1 / Bacteria Negative      PTH -- (Ca 7.5)      [02-26-22 @ 16:00]   312  Vitamin D (25OH) 21      [02-26-22 @ 16:00]  HbA1c 5.8      [01-30-20 @ 06:46]  Lipid: chol 81, , HDL 22, LDL --      [10-15-22 @ 09:53]    HBsAb <3.0      [12-29-19 @ 17:44]  HBsAb Nonreact      [12-29-19 @ 17:44]  HBsAg Nonreact      [12-29-19 @ 17:44]  HBcAb Nonreact      [12-29-19 @ 17:44]  HCV 0.10, Nonreact      [12-29-19 @ 17:44]

## 2022-10-15 NOTE — PROGRESS NOTE ADULT - SUBJECTIVE AND OBJECTIVE BOX
LAWRENCE SCHWABACHER 62y Male  MRN#: [id number]   Hospital Day: 1d    SUBJECTIVE  Patient is a 62y old Male who presents with a chief complaint of Sepsis (15 Oct 2022 01:23)  Currently admitted to medicine with the primary diagnosis of   INTERVAL HPI AND OVERNIGHT EVENTS:  Patient was examined and seen at bedside. This morning he is resting comfortably in bed and reports no issues or overnight events. Here for L foot OM diagnosed at [facility] on MRI, after patient stepped on several glass shards at home, developed infection and sent to ER by his podiatrist. Patient was sent as transfer from [facility] for evaluation by vascular, podiatry teams. ID also consulted. No fevers or chills. Eating well. Not in pain this AM. No BM this admission, passing gas. VSS, NAEON.      REVIEW OF SYMPTOMS:  Relevant ros per above    OBJECTIVE  PAST MEDICAL & SURGICAL HISTORY  CAD (coronary artery disease)  1 stent 2008    S/P CABG (coronary artery bypass graft)  x4    Diabetes mellitus    Transient ischemic attack (TIA)  2017; 2008    Chronic kidney disease (CKD)  Stage IV    Hypertension    Stented coronary artery  in 2008    Myocardial infarction  2012    PAD (peripheral artery disease)  S/p bypass left leg    HLD (hyperlipidemia)    BPH (benign prostatic hyperplasia)    Pain in left knee  s/p fall    OA (osteoarthritis)    [place] (hard of hearing)    Chronic anemia    S/P CABG (coronary artery bypass graft)  2012    H/O arterial bypass of lower limb  Left Lower Extremity (2016)    History of surgery  Left CEA (2017)  Left Pinkie toe Amputation (2014)  CABG x 4 (2012)  Card cath - stent (2008)      AV fistula  2019  LEFT AV FISTULA      ALLERGIES:  No Known Allergies    MEDICATIONS:  STANDING MEDICATIONS  aspirin enteric coated 81 milliGRAM(s) Oral daily  atorvastatin 40 milliGRAM(s) Oral at bedtime  clopidogrel Tablet 75 milliGRAM(s) Oral daily  furosemide    Tablet 40 milliGRAM(s) Oral every 12 hours  heparin   Injectable 5000 Unit(s) SubCutaneous every 12 hours  influenza   Vaccine 0.5 milliLiter(s) IntraMuscular once  meropenem  IVPB 500 milliGRAM(s) IV Intermittent every 24 hours  metoprolol tartrate 50 milliGRAM(s) Oral two times a day  sevelamer carbonate 800 milliGRAM(s) Oral three times a day    PRN MEDICATIONS  acetaminophen     Tablet .. 650 milliGRAM(s) Oral every 6 hours PRN  aluminum hydroxide/magnesium hydroxide/simethicone Suspension 30 milliLiter(s) Oral every 4 hours PRN  melatonin 3 milliGRAM(s) Oral at bedtime PRN  ondansetron Injectable 4 milliGRAM(s) IV Push every 8 hours PRN      VITAL SIGNS: Last 24 Hours  T(C): 36.9 (15 Oct 2022 04:00), Max: 37.8 (14 Oct 2022 21:08)  T(F): 98.4 (15 Oct 2022 04:00), Max: 100.1 (14 Oct 2022 21:08)  HR: 93 (15 Oct 2022 04:00) (93 - 128)  BP: 127/56 (15 Oct 2022 04:00) (109/59 - 127/56)  BP(mean): --  RR: 18 (15 Oct 2022 04:00) (18 - 20)  SpO2: 98% (14 Oct 2022 23:32) (94% - 98%)    LABS:          RADIOLOGY:      PHYSICAL EXAM:  CONSTITUTIONAL: No acute distress, well-developed, well-groomed, AAOx3  HEAD: Atraumatic, normocephalic  EYES: EOM intact, conjunctiva and sclera clear, non-injected, anicteric  ENT: Supple, no masses, no thyromegaly, no bruits, no JVD; moist mucous membranes  PULMONARY: Clear to auscultation bilaterally; no wheezes, rales, or rhonchi  CARDIOVASCULAR: Regular rate and rhythm; no murmurs, rubs, or gallops  GASTROINTESTINAL: Soft, non-tender, + increased resonance to percussion, obese habitus; bowel sounds present  MUSCULOSKELETAL: 2+ peripheral pulses; no clubbing, no cyanosis, no edema, surgical wrap in place over L foot, non-tender to palpation  NEUROLOGY: non-focal, moving extremities, normal strength, no facial droop  SKIN: No rashes or lesions; warm and dry

## 2022-10-15 NOTE — H&P ADULT - ATTENDING COMMENTS
Chart reviewed.  Patient examined and discussed with team resident today.  Pt with DM, HTN, ESRD on HD, CAD s/p stent x 1 (2008), 4 vessel CABG 2021, b/l angioplasty LE for PAD s/p arterial bypass LLE 2016, L CEA 2017 with prior hx of TIA who was transferred from [facility] initially admitted there for cellulitis LLE with OM of L foot.  Currently seen by podiatry and ID with vascular and cardiology pending.  PE is significant for deep ulcer mid-mod L plantar region with Probe + to bone and dry gangrene L heel.  Bl cs + ESBL E.coli and he is currently on IV Meropenem.  Per podiatry, anticipating plan for debridement (however, needs to clarify dates).    Revised Cardiac Risk Index:  Score is 4 points.  Class IV risk:    + hx of MI and CAD s/p stent (2008) and CABG x 4; no known HF, hx of TIAs s/p L CEA, + DM on insulin, + CKD with eGFR of 27 and creatine of 5    He has a 15% 30 day risk of death, MI or cardiac event.    Plavix should be dc'ed x 5 to 7 days prior to procedure.    ASA should be stopped x 3 days prior.    Needs to clarify with podiatry exact dates of anticipated surgery as vascular evaluation is pending.  Card to assist with pre-op management of his high risk cardiac patient.

## 2022-10-15 NOTE — H&P ADULT - NSHPREVIEWOFSYSTEMS_GEN_ALL_CORE
REVIEW OF SYSTEMS:    CONSTITUTIONAL: weakness, fevers   EYES/ENT:  No visual changes;  No vertigo or throat pain   NECK:  No pain or stiffness  RESPIRATORY:  No cough, wheezing, hemoptysis; No shortness of breath  CARDIOVASCULAR:  No chest pain or palpitations  GASTROINTESTINAL:  No abdominal or epigastric pain. No nausea, vomiting, or hematemesis; No diarrhea or constipation. No melena or hematochezia.  GENITOURINARY:  No dysuria, frequency or hematuria  MUSCULOSKELETAL:  LE weakness, No calf tenderness  SKIN:  No itching, rashes

## 2022-10-16 LAB
BLD GP AB SCN SERPL QL: SIGNIFICANT CHANGE UP
CRP SERPL-MCNC: 114.2 MG/L — HIGH
ERYTHROCYTE [SEDIMENTATION RATE] IN BLOOD: 140 MM/HR — HIGH (ref 0–10)
GLUCOSE BLDC GLUCOMTR-MCNC: 159 MG/DL — HIGH (ref 70–99)
GLUCOSE BLDC GLUCOMTR-MCNC: 170 MG/DL — HIGH (ref 70–99)
GLUCOSE BLDC GLUCOMTR-MCNC: 204 MG/DL — HIGH (ref 70–99)
GLUCOSE BLDC GLUCOMTR-MCNC: 244 MG/DL — HIGH (ref 70–99)

## 2022-10-16 PROCEDURE — 99232 SBSQ HOSP IP/OBS MODERATE 35: CPT

## 2022-10-16 RX ADMIN — SEVELAMER CARBONATE 800 MILLIGRAM(S): 2400 POWDER, FOR SUSPENSION ORAL at 22:00

## 2022-10-16 RX ADMIN — SEVELAMER CARBONATE 800 MILLIGRAM(S): 2400 POWDER, FOR SUSPENSION ORAL at 13:29

## 2022-10-16 RX ADMIN — MEROPENEM 100 MILLIGRAM(S): 1 INJECTION INTRAVENOUS at 05:43

## 2022-10-16 RX ADMIN — HEPARIN SODIUM 5000 UNIT(S): 5000 INJECTION INTRAVENOUS; SUBCUTANEOUS at 17:09

## 2022-10-16 RX ADMIN — Medication 2: at 07:49

## 2022-10-16 RX ADMIN — HEPARIN SODIUM 5000 UNIT(S): 5000 INJECTION INTRAVENOUS; SUBCUTANEOUS at 05:44

## 2022-10-16 RX ADMIN — SEVELAMER CARBONATE 800 MILLIGRAM(S): 2400 POWDER, FOR SUSPENSION ORAL at 05:43

## 2022-10-16 RX ADMIN — Medication 50 MILLIGRAM(S): at 05:43

## 2022-10-16 RX ADMIN — ATORVASTATIN CALCIUM 40 MILLIGRAM(S): 80 TABLET, FILM COATED ORAL at 22:00

## 2022-10-16 RX ADMIN — Medication 81 MILLIGRAM(S): at 11:53

## 2022-10-16 RX ADMIN — INSULIN GLARGINE 20 UNIT(S): 100 INJECTION, SOLUTION SUBCUTANEOUS at 22:00

## 2022-10-16 RX ADMIN — Medication 4: at 17:09

## 2022-10-16 RX ADMIN — Medication 2: at 11:53

## 2022-10-16 RX ADMIN — Medication 50 MILLIGRAM(S): at 17:07

## 2022-10-16 RX ADMIN — Medication 40 MILLIGRAM(S): at 05:43

## 2022-10-16 RX ADMIN — CLOPIDOGREL BISULFATE 75 MILLIGRAM(S): 75 TABLET, FILM COATED ORAL at 11:53

## 2022-10-16 NOTE — PROGRESS NOTE ADULT - ASSESSMENT
F/up of this 61 yo M  with DM, HTN, ESRD on HD, CAD s/p stent x 1 (2008), 4 vessel CABG 2021, b/l angioplasty LE for PAD s/p arterial bypass LLE 2016, L CEA 2017 with prior hx of TIA who was transferred from [facility] initially admitted there for cellulitis LLE with OM of L foot.  Currently seen by podiatry and ID with vascular and cardiology pending. DFU with OM suspected.   Bl cs + ESBL E.coli.    To continue  IV Meropenem.  ID and Podiatry to f/up.  Vascular and cardiology consults still pending.   Renal recommendations as noted.  Will dc lasix.    Revised Cardiac Risk Index:  Score is 4 points.  Class IV risk:    + hx of MI and CAD s/p stent (2008) and CABG x 4; no known HF, hx of TIAs s/p L CEA, + DM on insulin, + CKD on HD with eGFR of 27 and creatine of 5.4    He has a 15% 30 day risk of death, MI or cardiac event.    Plavix should be dc'ed x 5 to 7 days prior to procedure.    ASA should be stopped x 3 days prior.    Spoke with podiatry service earlier this am.  Will stop Plavix starting today.  ASA to be stopped 3 days prior to procedure.  Card to assist with pre-op management of his high risk cardiac patient.

## 2022-10-16 NOTE — PROGRESS NOTE ADULT - SUBJECTIVE AND OBJECTIVE BOX
SCHWABACHER, LAWRENCE  62y, Male  Allergy: No Known Allergies      OVERNIGHT EVENTS:  None reported    PAST MEDICAL & SURGICAL HISTORY:  CAD (coronary artery disease) 1 stent 2008  S/P CABG (coronary artery bypass graft)x4  Diabetes mellitus  Transient ischemic attack (TIA) 2017; 2008 s/p L CEA 2017  Chronic kidney disease (CKD) end stage on HD with L AVF (2019)  Hypertension  Myocardial infarction 2012  PAD (peripheral artery disease) S/p bypass left leg 2016  HLD (hyperlipidemia)  BPH (benign prostatic hyperplasia)  Pain in left knee s/p fall  OA (osteoarthritis)  [place] (hard of hearing)  Chronic anemia  Left Pinkie toe Amputation (2014)    VITALS:  T(F): 97.4 (10-16-22 @ 12:49), Max: 99.8 (10-15-22 @ 21:45)  HR: 79 (10-16-22 @ 12:49)  BP: 137/63 (10-16-22 @ 12:49) (126/59 - 142/67)  RR: 18 (10-16-22 @ 12:49)  SpO2: 98% (10-16-22 @ 12:49)    TESTS & MEASUREMENTS:    Weight (kg): 90.1 (10-14-22 @ 21:08)    10-14-22 @ 07:01  -  10-15-22 @ 07:00  --------------------------------------------------------  IN: 0 mL / OUT: 200 mL / NET: -200 mL    PHYSICAL:    Awake, responsive, no new c/o noted.  Seems satisfied that his glycemic control is improved.  No c/o pain.  Neck supple.  Chest CTA b/l  Cor S1S2, not tachy  Abd soft and NT.  LLE dressing is clean and in place.  Podiatry evaluation of plantar ulcers as noted   - deep ulcer mid-mod L plantar region with + probe to the bone   with dry gangrene L heel.    No new labs today.  Pt due for HD tomorrow.                       8.2    12.91 )-----------( 329      ( 15 Oct 2022 09:53 )             25.2     PT/INR - ( 15 Oct 2022 09:53 )   PT: 13.90 sec;   INR: 1.21 ratio         PTT - ( 15 Oct 2022 09:53 )  PTT:30.9 sec  10-15    132<L>  |  94<L>  |  53<H>  ----------------------------<  262<H>  4.9   |  26  |  5.4<HH>    Ca    8.0<L>      15 Oct 2022 19:27    TPro  6.4  /  Alb  2.5<L>  /  TBili  0.2  /  DBili  x   /  AST  34  /  ALT  17  /  AlkPhos  187<H>  10-15    LIVER FUNCTIONS - ( 15 Oct 2022 19:27 )  Alb: 2.5 g/dL / Pro: 6.4 g/dL / ALK PHOS: 187 U/L / ALT: 17 U/L / AST: 34 U/L / GGT: x           CAPILLARY BLOOD GLUCOSE  POCT Blood Glucose.: 170 mg/dL (16 Oct 2022 11:13)  POCT Blood Glucose.: 159 mg/dL (16 Oct 2022 07:44)  POCT Blood Glucose.: 263 mg/dL (15 Oct 2022 21:09)  POCT Blood Glucose.: 319 mg/dL (15 Oct 2022 16:44)    Hb A1c 6.8%    MEDICATIONS:  MEDICATIONS  (STANDING):  aspirin enteric coated 81 milliGRAM(s) Oral daily  atorvastatin 40 milliGRAM(s) Oral at bedtime  furosemide    Tablet 40 milliGRAM(s) Oral every 12 hours  glucagon  Injectable 1 milliGRAM(s) IntraMuscular once  heparin   Injectable 5000 Unit(s) SubCutaneous every 12 hours  influenza   Vaccine 0.5 milliLiter(s) IntraMuscular once  insulin glargine Injectable (LANTUS) 20 Unit(s) SubCutaneous at bedtime  insulin lispro (ADMELOG) corrective regimen sliding scale   SubCutaneous three times a day before meals  meropenem  IVPB 500 milliGRAM(s) IV Intermittent every 24 hours  metoprolol tartrate 50 milliGRAM(s) Oral two times a day  sevelamer carbonate 800 milliGRAM(s) Oral three times a day    MEDICATIONS  (PRN):  acetaminophen     Tablet .. 650 milliGRAM(s) Oral every 6 hours PRN Temp greater or equal to 38C (100.4F), Mild Pain (1 - 3)  aluminum hydroxide/magnesium hydroxide/simethicone Suspension 30 milliLiter(s) Oral every 4 hours PRN Dyspepsia  dextrose Oral Gel 15 Gram(s) Oral once PRN Blood Glucose LESS THAN 70 milliGRAM(s)/deciliter  melatonin 3 milliGRAM(s) Oral at bedtime PRN Insomnia  ondansetron Injectable 4 milliGRAM(s) IV Push every 8 hours PRN Nausea and/or Vomiting

## 2022-10-16 NOTE — PROGRESS NOTE ADULT - SUBJECTIVE AND OBJECTIVE BOX
Podiatry Progress Note    Subjective:   SCHWABACHER, LAWRENCE is a pleasant well-nourished, well developed 62y Male in no acute distress, alert awake, and oriented to person, place and time.  Patient is a 62y old  Male who presents with a chief complaint of Sepsis (15 Oct 2022 12:31). Pt seen & evaluated at bedside. Dressings D/C/I to Left Foot. Pt denies N/V/F/C/pain. No new pedal complaints.      PAST MEDICAL & SURGICAL HISTORY:  CAD (coronary artery disease)  1 stent 2008      S/P CABG (coronary artery bypass graft)  x4      Diabetes mellitus      Transient ischemic attack (TIA)  2017; 2008      Chronic kidney disease (CKD)  Stage IV      Hypertension      Stented coronary artery  in 2008      Myocardial infarction  2012      PAD (peripheral artery disease)  S/p bypass left leg      HLD (hyperlipidemia)      BPH (benign prostatic hyperplasia)      Pain in left knee  s/p fall      OA (osteoarthritis)      [place] (hard of hearing)      Chronic anemia      S/P CABG (coronary artery bypass graft)  2012      H/O arterial bypass of lower limb  Left Lower Extremity (2016)      History of surgery  Left CEA (2017)  Left Pinkie toe Amputation (2014)  CABG x 4 (2012)  Card cath - stent (2008)        AV fistula  2019  LEFT AV FISTULA           Objective:  Vital Signs Last 24 Hrs  T(C): 37.7 (15 Oct 2022 21:45), Max: 37.7 (15 Oct 2022 21:45)  T(F): 99.8 (15 Oct 2022 21:45), Max: 99.8 (15 Oct 2022 21:45)  HR: 74 (16 Oct 2022 05:59) (74 - 89)  BP: 126/59 (16 Oct 2022 05:59) (126/59 - 143/58)  BP(mean): --  RR: 18 (15 Oct 2022 21:45) (18 - 18)  SpO2: 100% (15 Oct 2022 14:29) (100% - 100%)    Parameters below as of 15 Oct 2022 14:29  Patient On (Oxygen Delivery Method): nasal cannula  O2 Flow (L/min): 2                          8.2    12.91 )-----------( 329      ( 15 Oct 2022 09:53 )             25.2                 10-15    132<L>  |  94<L>  |  53<H>  ----------------------------<  262<H>  4.9   |  26  |  5.4<HH>    Ca    8.0<L>      15 Oct 2022 19:27    TPro  6.4  /  Alb  2.5<L>  /  TBili  0.2  /  DBili  x   /  AST  34  /  ALT  17  /  AlkPhos  187<H>  10-15            ----------------------------    Physical Exam - Lower Extremity Focused:   #Left Lower Extremity  Derm:   Open Left Plantar Ulcer @ Plantar Medial Rearfoot;    -Wound Base Fibrogranular; 50% Fibrous, 50% Granular; unchanged;    -Wound Probes to Bone w/ Mild Serosanguinous Drainage;    -Wound Margins Irregular;  Skin to Plantar Heel Dry, Dark, Hard; Boggy to w/Palpation  Skin LLE Dry, Scaly;   Mild Cellulitis w/ Erythema of Left Lower Extremity;    Vascular: DP and PT Pulses Diminished; Foot is Tepid to Warm to the touch; Capillary Refill Delayed to the Toes;  Neuro: Protective Sensation Diminished / Moderately Neuropathic   MSK: Mild Charcot Deformity Left Foot; No Pain On Palpation at Wound Site     Assessment:  Left Plantar Rearfoot Ulcer - Probes to Bone  Dry Gangrene Left Plantar Heel  Cellulitic Left Lower Extremity     Plan:  Chart reviewed and Patient evaluated. All Questions and Concerns Addressed and Answered  Discussed diagnosis and treatment with patient  Wound Flushed w/ NS; Wound Packed w/ Iodoform; Dressed w/Betadine-soaked Gauze / DSD / Kerlix   Local Wound Care; As Stated Above   Deep Left Foot Wound Culture Obtained; Sent to Pathology Lab; Will f/u;  XR Imaging Left Foot; Pending Read;  MR Imaging LE Joint Left; Ordered; will f/u;  Lower Extremity Arterial Duplex B/L from 9/15/22; Mild to moderate peripheral arterial disease noted in the distal right popliteal artery; See Full Report Above;     -Vascular Consult Active; will f/u;  ESR/CRP;   ID Consult Active; WBC; 12.54; will f/u Recs;  Sx CANCELLED Mon 10/17/22; Excisional Debridement of Soft Tissue & Bone, Left Foot;     -Patient on dual-anticoagulation (ASA, Plavix); needs 5d off Plavix prior to sx;  **Request Medical Clearance**;  Attending to round on Patient tomorrow, Mon 10/17/22;  Discussed Plan w/ Attending;    Podiatry

## 2022-10-17 LAB
ALBUMIN SERPL ELPH-MCNC: 2.6 G/DL — LOW (ref 3.5–5.2)
ALP SERPL-CCNC: 170 U/L — HIGH (ref 30–115)
ALT FLD-CCNC: 11 U/L — SIGNIFICANT CHANGE UP (ref 0–41)
ANION GAP SERPL CALC-SCNC: 12 MMOL/L — SIGNIFICANT CHANGE UP (ref 7–14)
AST SERPL-CCNC: 17 U/L — SIGNIFICANT CHANGE UP (ref 0–41)
BILIRUB SERPL-MCNC: 0.2 MG/DL — SIGNIFICANT CHANGE UP (ref 0.2–1.2)
BUN SERPL-MCNC: 40 MG/DL — HIGH (ref 10–20)
CALCIUM SERPL-MCNC: 7.7 MG/DL — LOW (ref 8.4–10.5)
CHLORIDE SERPL-SCNC: 98 MMOL/L — SIGNIFICANT CHANGE UP (ref 98–110)
CO2 SERPL-SCNC: 25 MMOL/L — SIGNIFICANT CHANGE UP (ref 17–32)
CREAT SERPL-MCNC: 4.2 MG/DL — CRITICAL HIGH (ref 0.7–1.5)
EGFR: 15 ML/MIN/1.73M2 — LOW
GLUCOSE BLDC GLUCOMTR-MCNC: 162 MG/DL — HIGH (ref 70–99)
GLUCOSE BLDC GLUCOMTR-MCNC: 173 MG/DL — HIGH (ref 70–99)
GLUCOSE BLDC GLUCOMTR-MCNC: 188 MG/DL — HIGH (ref 70–99)
GLUCOSE BLDC GLUCOMTR-MCNC: 196 MG/DL — HIGH (ref 70–99)
GLUCOSE SERPL-MCNC: 164 MG/DL — HIGH (ref 70–99)
HCT VFR BLD CALC: 25.2 % — LOW (ref 42–52)
HGB BLD-MCNC: 8.4 G/DL — LOW (ref 14–18)
MAGNESIUM SERPL-MCNC: 1.9 MG/DL — SIGNIFICANT CHANGE UP (ref 1.8–2.4)
MCHC RBC-ENTMCNC: 32.1 PG — HIGH (ref 27–31)
MCHC RBC-ENTMCNC: 33.3 G/DL — SIGNIFICANT CHANGE UP (ref 32–37)
MCV RBC AUTO: 96.2 FL — HIGH (ref 80–94)
NRBC # BLD: 0 /100 WBCS — SIGNIFICANT CHANGE UP (ref 0–0)
PLATELET # BLD AUTO: 382 K/UL — SIGNIFICANT CHANGE UP (ref 130–400)
POTASSIUM SERPL-MCNC: 4.4 MMOL/L — SIGNIFICANT CHANGE UP (ref 3.5–5)
POTASSIUM SERPL-SCNC: 4.4 MMOL/L — SIGNIFICANT CHANGE UP (ref 3.5–5)
PROT SERPL-MCNC: 6.3 G/DL — SIGNIFICANT CHANGE UP (ref 6–8)
RBC # BLD: 2.62 M/UL — LOW (ref 4.7–6.1)
RBC # FLD: 15.7 % — HIGH (ref 11.5–14.5)
SODIUM SERPL-SCNC: 135 MMOL/L — SIGNIFICANT CHANGE UP (ref 135–146)
WBC # BLD: 9.09 K/UL — SIGNIFICANT CHANGE UP (ref 4.8–10.8)
WBC # FLD AUTO: 9.09 K/UL — SIGNIFICANT CHANGE UP (ref 4.8–10.8)

## 2022-10-17 PROCEDURE — 99233 SBSQ HOSP IP/OBS HIGH 50: CPT

## 2022-10-17 PROCEDURE — 73722 MRI JOINT OF LWR EXTR W/DYE: CPT | Mod: 26,LT

## 2022-10-17 RX ADMIN — Medication 2: at 17:28

## 2022-10-17 RX ADMIN — Medication 2: at 12:58

## 2022-10-17 RX ADMIN — Medication 2: at 08:09

## 2022-10-17 RX ADMIN — ATORVASTATIN CALCIUM 40 MILLIGRAM(S): 80 TABLET, FILM COATED ORAL at 21:28

## 2022-10-17 RX ADMIN — HEPARIN SODIUM 5000 UNIT(S): 5000 INJECTION INTRAVENOUS; SUBCUTANEOUS at 18:55

## 2022-10-17 RX ADMIN — Medication 81 MILLIGRAM(S): at 14:30

## 2022-10-17 RX ADMIN — INSULIN GLARGINE 20 UNIT(S): 100 INJECTION, SOLUTION SUBCUTANEOUS at 21:27

## 2022-10-17 RX ADMIN — HEPARIN SODIUM 5000 UNIT(S): 5000 INJECTION INTRAVENOUS; SUBCUTANEOUS at 05:51

## 2022-10-17 RX ADMIN — Medication 50 MILLIGRAM(S): at 17:31

## 2022-10-17 RX ADMIN — SEVELAMER CARBONATE 800 MILLIGRAM(S): 2400 POWDER, FOR SUSPENSION ORAL at 14:30

## 2022-10-17 RX ADMIN — MEROPENEM 100 MILLIGRAM(S): 1 INJECTION INTRAVENOUS at 05:51

## 2022-10-17 RX ADMIN — SEVELAMER CARBONATE 800 MILLIGRAM(S): 2400 POWDER, FOR SUSPENSION ORAL at 05:52

## 2022-10-17 RX ADMIN — SEVELAMER CARBONATE 800 MILLIGRAM(S): 2400 POWDER, FOR SUSPENSION ORAL at 21:28

## 2022-10-17 RX ADMIN — Medication 50 MILLIGRAM(S): at 05:52

## 2022-10-17 NOTE — PROGRESS NOTE ADULT - ASSESSMENT
· Assessment	   63 y/o with a PMHx of DM, HTN, ESRD (on dialysis M/W/F, last session on 10/14), CAD s/p 1 stent on 2008 and quadruple CABG on 2012, PAD s/p bilateral angioplasty at [facility], was transferred from [facility] complaining of weakness and unhealed wound in the left foot.    #Left Foot wound, likely OM  - Podiatry consult  - ID consult appreciated  - Blood cultures  - X-ray L foot  - MRI L foot  - Wound cx from 9/16 positive for Enterobacter cloacae, patient was treated with 2x weeks Post-HD Vanc + cefepime   - ESBL E coli bacteremia from [facility] cultures  - Per ID - c/w IV Meropenem 500mg  - Podiatry consult appreciated, scheduled for OR on 10/21 debridement  - Will need medical clearance and risk stratification for OR    #Leg weakness likely secondary to PAD  - vascular surgery consult pending    #ESRD  - Last dialysis on Saturday (10/14)  - Nephro consult     #CAD  - c/w statin  - c/w ASA 81mg until 3 days prior to OR (Oct 18th)  - Holding Plavix currently (held 5 days prior to OR)  - c/w Lopressor 50mg  - c/w Lasix 40mg PO BID    #DM  - Obtain height/weight   - start Insulin sliding scale   - Pt takes 36 units of Lispro at home and trulicity 1/annette    #MISC  - Diet: DASH  - Activity: Ambulate as tolerated   - DVT: Heparin    · Assessment	   61 y/o with a PMHx of DM, HTN, ESRD (on dialysis M/W/F, last session on 10/14), CAD s/p 1 stent on 2008 and quadruple CABG on 2012, PAD s/p bilateral angioplasty at [facility], was transferred from [facility] complaining of weakness and unhealed wound in the left foot.    #Left Foot wound, likely OM  - Podiatry consult  - ID consult appreciated  - Blood cultures  - X-ray L foot  - MRI L foot  - Wound cx from 9/16 positive for Enterobacter cloacae, patient was treated with 2x weeks Post-HD Vanc + cefepime   - ESBL E coli bacteremia from [facility] cultures  - Per ID - c/w IV Meropenem 500mg  - Podiatry consult appreciated, scheduled for OR on 10/21 debridement  - Will need medical clearance and risk stratification for OR  - F/u labs    #Leg weakness likely secondary to PAD  - vascular surgery consult pending    #ESRD  - Last dialysis on Saturday (10/14)  - Nephro consult     #CAD  - c/w statin  - c/w ASA 81mg until 3 days prior to OR (Oct 18th)  - Holding Plavix currently (held 5 days prior to OR)  - c/w Lopressor 50mg  - c/w Lasix 40mg PO BID  - Cardio c/s pre-op clearance    #DM  - Obtain height/weight   - start Insulin sliding scale   - Pt takes 36 units of Lispro at home and trulicity 1/annette    #MISC  - Diet: DASH  - Activity: Ambulate as tolerated   - DVT: Heparin    · Assessment	   61 y/o with a PMHx of DM, HTN, ESRD (on dialysis M/W/F, last session on 10/14), CAD s/p 1 stent on 2008 and quadruple CABG on 2012, PAD s/p bilateral angioplasty at [facility], was transferred from [facility] complaining of weakness and unhealed wound in the left foot.    #Left Foot wound, likely OM  - Podiatry consult  - ID consult appreciated  - Blood cultures  - X-ray L foot  - MRI L foot  - Wound cx from 9/16 positive for Enterobacter cloacae, patient was treated with 2x weeks Post-HD Vanc + cefepime   - ESBL E coli bacteremia from [facility] cultures  - Per ID - c/w IV Meropenem 500mg  - Podiatry consult appreciated, scheduled for OR on 10/21 debridement  - Will need medical clearance and risk stratification for OR  - F/u labs      #Leg weakness likely secondary to PAD  - Vascular Sx c/s, will get lower extremity arterial duplex (most recent one in september)    #ESRD  - Last dialysis on Saturday (10/14)  - Nephro consult     #CAD  - c/w statin  - c/w ASA 81mg until 3 days prior to OR (Oct 18th)  - Holding Plavix currently (held 5 days prior to OR)  - c/w Lopressor 50mg  - c/w Lasix 40mg PO BID  - Cardio c/s pre-op clearance    #DM  - Obtain height/weight   - start Insulin sliding scale   - Pt takes 36 units of Lispro at home and trulicity 1/weel    #MISC  - Diet: DASH  - Activity: Ambulate as tolerated   - DVT: Heparin

## 2022-10-17 NOTE — PROGRESS NOTE ADULT - SUBJECTIVE AND OBJECTIVE BOX
LAWRENCE SCHWABACHER 62y Male  MRN#: [id number]   Hospital Day: 3d    SUBJECTIVE  Patient is a 62y old Male who presents with a chief complaint of Sepsis (17 Oct 2022 13:26)  Currently admitted to medicine with the primary diagnosis of   INTERVAL HPI AND OVERNIGHT EVENTS:  Patient was examined and seen at bedside. This morning he is resting comfortably in bed and reports no issues or overnight events. Eating well, has minimal foot pain. Pending vascular, cardiology c/s and pre-op workups. Podiatry has patient scheduled for surgery on 10/21, Plavix held currently (needs to be held 5 days prior) and ASA to be held 3 days prior. Otherwise VSS, NAEON.     REVIEW OF SYMPTOMS:  Relevant ros per above    OBJECTIVE  PAST MEDICAL & SURGICAL HISTORY  CAD (coronary artery disease)  1 stent 2008    S/P CABG (coronary artery bypass graft)  x4    Diabetes mellitus    Transient ischemic attack (TIA)  2017; 2008    Chronic kidney disease (CKD)  Stage IV    Hypertension    Stented coronary artery  in 2008    Myocardial infarction  2012    PAD (peripheral artery disease)  S/p bypass left leg    HLD (hyperlipidemia)    BPH (benign prostatic hyperplasia)    Pain in left knee  s/p fall    OA (osteoarthritis)    [place] (hard of hearing)    Chronic anemia    S/P CABG (coronary artery bypass graft)  2012    H/O arterial bypass of lower limb  Left Lower Extremity (2016)    History of surgery  Left CEA (2017)  Left Pinkie toe Amputation (2014)  CABG x 4 (2012)  Card cath - stent (2008)      AV fistula  2019  LEFT AV FISTULA      ALLERGIES:  No Known Allergies    MEDICATIONS:  STANDING MEDICATIONS  aspirin enteric coated 81 milliGRAM(s) Oral daily  atorvastatin 40 milliGRAM(s) Oral at bedtime  dextrose 5%. 1000 milliLiter(s) IV Continuous <Continuous>  dextrose 5%. 1000 milliLiter(s) IV Continuous <Continuous>  dextrose 50% Injectable 25 Gram(s) IV Push once  dextrose 50% Injectable 12.5 Gram(s) IV Push once  dextrose 50% Injectable 25 Gram(s) IV Push once  glucagon  Injectable 1 milliGRAM(s) IntraMuscular once  heparin   Injectable 5000 Unit(s) SubCutaneous every 12 hours  influenza   Vaccine 0.5 milliLiter(s) IntraMuscular once  insulin glargine Injectable (LANTUS) 20 Unit(s) SubCutaneous at bedtime  insulin lispro (ADMELOG) corrective regimen sliding scale   SubCutaneous three times a day before meals  meropenem  IVPB 500 milliGRAM(s) IV Intermittent every 24 hours  metoprolol tartrate 50 milliGRAM(s) Oral two times a day  sevelamer carbonate 800 milliGRAM(s) Oral three times a day    PRN MEDICATIONS  acetaminophen     Tablet .. 650 milliGRAM(s) Oral every 6 hours PRN  aluminum hydroxide/magnesium hydroxide/simethicone Suspension 30 milliLiter(s) Oral every 4 hours PRN  dextrose Oral Gel 15 Gram(s) Oral once PRN  melatonin 3 milliGRAM(s) Oral at bedtime PRN  ondansetron Injectable 4 milliGRAM(s) IV Push every 8 hours PRN      VITAL SIGNS: Last 24 Hours  T(C): 35.7 (17 Oct 2022 05:09), Max: 36.2 (16 Oct 2022 21:45)  T(F): 96.2 (17 Oct 2022 05:09), Max: 97.1 (16 Oct 2022 21:45)  HR: 78 (17 Oct 2022 12:30) (72 - 82)  BP: 153/80 (17 Oct 2022 12:30) (118/58 - 156/72)  BP(mean): --  RR: 18 (17 Oct 2022 12:30) (18 - 18)  SpO2: 98% (16 Oct 2022 21:45) (98% - 98%)    LABS:    10-15    132<L>  |  94<L>  |  53<H>  ----------------------------<  262<H>  4.9   |  26  |  5.4<HH>    Ca    8.0<L>      15 Oct 2022 19:27    TPro  6.4  /  Alb  2.5<L>  /  TBili  0.2  /  DBili  x   /  AST  34  /  ALT  17  /  AlkPhos  187<H>  10-15              Culture - Blood (collected 15 Oct 2022 12:07)  Source: .Blood None  Preliminary Report (16 Oct 2022 21:01):    No growth to date.          RADIOLOGY:      PHYSICAL EXAM:  CONSTITUTIONAL: No acute distress, well-developed, well-groomed, AAOx3  HEAD: Atraumatic, normocephalic  EYES: EOM intact, conjunctiva and sclera clear, non-injected, anicteric  ENT: Supple, no masses, no thyromegaly, no bruits, no JVD; moist mucous membranes  PULMONARY: Clear to auscultation bilaterally; no wheezes, rales, or rhonchi  CARDIOVASCULAR: Regular rate and rhythm; no murmurs, rubs, or gallops  GASTROINTESTINAL: Soft, non-tender, + increased resonance to percussion, obese habitus; bowel sounds present  MUSCULOSKELETAL: 2+ peripheral pulses; no clubbing, no cyanosis, no edema, surgical wrap in place over L foot, non-tender to palpation  NEUROLOGY: non-focal, moving extremities, normal strength, no facial droop  SKIN: No rashes or lesions; warm and dry

## 2022-10-17 NOTE — PROGRESS NOTE ADULT - ASSESSMENT
61 y/o with a PMHx of DM, HTN, ESRD (on dialysis M/W/F, last session on 10/14), CAD s/p 1 stent on 2008 and quadruple CABG on 2012, PAD s/p bilateral angioplasty at [facility], was transferred from [facility] complaining of weakness and unhealed wound in the left foot.    #Left DFU, suspected OM  #PAD  - Podiatry following - must be off plavix for 5 days  - ID following  - vascular and cardio requested by podiatry   - follow up cultures   - MRI L foot done - report pending   - currently on sharlene     #ESRD on HD  - HD today  - renal following    #CAD  - on statin   - hold asa 3 days prior to procedure  - holding plavix since 10/16  - continue BB    #DM II  - FS well controlled on current regimen - latus 20units qhs and correction scale     Progress Note Handoff  Pending Consults: vascular, cardio  Pending Tests: labs  Pending Results: MRI, labs  Family Discussion: discussed HD, labs, MRI, OR and consults with pt and medical staff in detail. All questions answered.  Disposition: Home_____/SNF______/Other_____/Unknown at this time__x___  Spent over 35 min reviewing chart and on coordinating patient care during interdisciplinary rounds

## 2022-10-17 NOTE — PROGRESS NOTE ADULT - ASSESSMENT
63 y/o with a PMHx of DM, HTN, ESRD (on dialysis M/W/F, last session on 10/14), CAD s/p 1 stent on 2008 and quadruple CABG on 2012, PAD s/p bilateral angioplasty at [facility], was transferred from [facility] complaining of weakness and unhealed wound in the left foot. Patient was evaluated by vascular surgery and podiatry at [facility], who recommended debriding the L foot wound. Patient requested to be transferred to [facility] where his vascular surgeons are.   # ESRD , for HD today 3h opti 160 2k UF 2.5 l   # seen by ID on meropenem   # podiatry notes appreciated   #on binders/ check ph level / repeat BMP   # check iron stores / RAMSEY with next  HD   # will follow

## 2022-10-17 NOTE — CONSULT NOTE ADULT - ASSESSMENT
ASSESSMENT:   61 y/o with a PMHx of DM, HTN, ESRD (on dialysis M/W/F, last session on 10/14), CAD s/p 1 stent on 2008 and quadruple CABG on 2012, PAD s/p bilateral angioplasty at [facility], was transferred from [facility] complaining of weakness and unhealed wound in the left foot.  Vascular surgery consulted for L nonhealing foot ulcer with history of PAD.     Patient has intact, dopplerable signals in the LLE. In 9/15, patient had no evidence of arterial stenosis in the LLE.     PLAN:    - Follow up bilateral arterial duplex   - LLE angiogram may be indicated if L-sided stenosis is present   - Vascular surgery following    Plan to be discussed with Attending, Dr. Porter  SPECTRA [id number] ASSESSMENT:   61 y/o with a PMHx of DM, HTN, ESRD (on dialysis M/W/F, last session on 10/14), CAD s/p 1 stent on 2008 and quadruple CABG on 2012, PAD s/p bilateral angioplasty at [facility], was transferred from [facility] complaining of weakness and unhealed wound in the left foot.  Vascular surgery consulted for L nonhealing foot ulcer with history of PAD.     Patient has intact, dopplerable signals in the LLE. In 9/15, patient had no evidence of arterial stenosis in the LLE.     PLAN:    - Follow up bilateral arterial duplex   - Follow up Ankle-Brachial index, PVR   - LLE angiogram may be indicated if L-sided stenosis is present   - Vascular surgery following    Plan to be discussed with Attending, Dr. Porter  SPECTRA [id number] ASSESSMENT:   61 y/o with a PMHx of DM, HTN, ESRD (on dialysis M/W/F, last session on 10/14), CAD s/p 1 stent on 2008 and quadruple CABG on 2012, PAD s/p bilateral angioplasty at [facility], was transferred from [facility] complaining of weakness and unhealed wound in the left foot.  Vascular surgery consulted for L nonhealing foot ulcer with history of PAD.     Patient has intact, dopplerable signals in the LLE. In 9/15, patient had no evidence of arterial stenosis in the LLE.     PLAN:   - Follow up bilateral arterial duplex  - Follow up Ankle-Brachial index, PVR  - Added on for LLE angiogram possible endovascular revascularization for 10/18  - NPO @ [facility]  - Pre-op labs including CBC, BMP, mag, phos, coags, T&S  - COVID swab, CXR  - Case discussed with vascular fellow    Plan to be discussed with Attending, Dr. Porter  SPECTRA [id number]

## 2022-10-17 NOTE — CONSULT NOTE ADULT - SUBJECTIVE AND OBJECTIVE BOX
VASCULAR SURGERY CONSULT NOTE    HPI:   61 y/o with a PMHx of DM, HTN, ESRD (on dialysis M/W/F, last session on 10/14), CAD s/p 1 stent on 2008 and quadruple CABG on 2012, PAD s/p bilateral angioplasty at [facility], was transferred from [facility] complaining of weakness and unhealed wound in the left foot.    Two months ago the patient inadvertently stepped on glass while cleaning his house injuring his left foot. His podiatrist removed glass fragments and sent him home on a week long course of antibiotics (patient unsure about which ABx). According to the patient the wound failed to improve, he progressively developed weakness on his legs, and recurring vomiting, which prompted him to go to [facility]'s ED this last Tuesday 10/11. At [facility] patient was found to be septic and was started on IV Vancomycin 1000mg and Meropenem 500mg. Blood cultures grew ESBL E.Coli. An MRI of the foot showed "osteomyelitis of the calcaneus bone, with what appears to be a subacute fracture". Patient was evaluated by vascular surgery and podiatry at [facility], who recommended debriding the L foot wound.     Patient is a known vascular surgery patient. He has received the following procedures:   - 2/28/22: RLE angio with atherectomy of R SFA, R AT balloon angioplasty and atherectomy   - 8/30/22: R infrapopliteal angio, atherectomy of R SFA     Patient reports R calf pain with after extended ambulation which improves with rest. Patient does not report any ischemic L sided symptoms.     PAST MEDICAL & SURGICAL HISTORY:  CAD (coronary artery disease)  1 stent 2008  S/P CABG (coronary artery bypass graft)  x4  Diabetes mellitus  Transient ischemic attack (TIA)  2017; 2008  Chronic kidney disease (CKD)  Stage IV  Hypertension  Stented coronary artery  in 2008  Myocardial infarction  2012  PAD (peripheral artery disease)  S/p bypass left leg  HLD (hyperlipidemia)  BPH (benign prostatic hyperplasia)  Pain in left knee  s/p fall  OA (osteoarthritis)  [place] (hard of hearing)  Chronic anemia  S/P CABG (coronary artery bypass graft)  2012  H/O arterial bypass of lower limb  Left Lower Extremity (2016)    History of surgery  Left CEA (2017)  Left Pinkie toe Amputation (2014)  CABG x 4 (2012)  Card cath - stent (2008)  AV fistula  2019  LEFT AV FISTULA    No Known Allergies    Home Medications:  aspirin 81 mg oral tablet: 1 tab(s) orally once a day (14 Sep 2022 17:42)  furosemide 40 mg oral tablet: 2 tab(s) orally once a day (14 Sep 2022 17:42)  Levemir 100 units/mL subcutaneous solution: 40 unit(s) subcutaneous once a day (at bedtime) (14 Sep 2022 17:42)  Metoprolol Tartrate 50 mg oral tablet: 1 tab(s) orally 2 times a day (14 Sep 2022 17:42)  Plavix 75 mg oral tablet: 1 tab(s) orally once a day (14 Sep 2022 17:42)  sevelamer carbonate 800 mg oral tablet: 1 tab(s) orally 3 times a day (with meals) (20 Sep 2022 11:36)  Trulicity Pen 1.5 mg/0.5 mL subcutaneous solution: 1.5  subcutaneous once a week (14 Sep 2022 17:42)    12 point ROS otherwise normal except as stated in HPI    PHYSICAL EXAM  Vital Signs Last 24 Hrs  T(C): 35.8 (17 Oct 2022 14:19), Max: 36.2 (16 Oct 2022 21:45)  T(F): 96.4 (17 Oct 2022 14:19), Max: 97.1 (16 Oct 2022 21:45)  HR: 78 (17 Oct 2022 14:19) (72 - 82)  BP: 150/67 (17 Oct 2022 14:19) (118/58 - 156/72)  BP(mean): --  RR: 18 (17 Oct 2022 14:19) (18 - 18)  SpO2: 98% (16 Oct 2022 21:45) (98% - 98%)    Parameters below as of 17 Oct 2022 12:30  Patient On (Oxygen Delivery Method): room air    Appearance: Normal	  HEENT:   Normal oral mucosa, PERRL, EOMI	  Neck: Supple, - JVD; Carotid Bruit   Cardiovascular: Normal S1 S2, No JVD, No murmurs,   Respiratory: Lungs clear to auscultation, No Rales, Rhonchi, Wheezing	  Gastrointestinal:  Soft, Non-tender, positive BS	  Skin: No rashes, No ecchymoses, No cyanosis  Extremities: L nonhealing heel wound with gangrene  Vascular: Peripheral pulses palpable 2+ bilaterally  Neurologic: Non-focal  Psychiatry: A & O x 3, Mood & affect appropriate    PULSES:  Femoral: 2+ b/l  Popliteal: 2+ b/l  Dorsal Pedal: R 2+ // L DS+  Posterior Tibial: R DS+ // L DS+    MEDICATIONS:   MEDICATIONS  (STANDING):  aspirin enteric coated 81 milliGRAM(s) Oral daily  atorvastatin 40 milliGRAM(s) Oral at bedtime  dextrose 5%. 1000 milliLiter(s) (100 mL/Hr) IV Continuous <Continuous>  dextrose 5%. 1000 milliLiter(s) (50 mL/Hr) IV Continuous <Continuous>  dextrose 50% Injectable 25 Gram(s) IV Push once  dextrose 50% Injectable 12.5 Gram(s) IV Push once  dextrose 50% Injectable 25 Gram(s) IV Push once  glucagon  Injectable 1 milliGRAM(s) IntraMuscular once  heparin   Injectable 5000 Unit(s) SubCutaneous every 12 hours  influenza   Vaccine 0.5 milliLiter(s) IntraMuscular once  insulin glargine Injectable (LANTUS) 20 Unit(s) SubCutaneous at bedtime  insulin lispro (ADMELOG) corrective regimen sliding scale   SubCutaneous three times a day before meals  meropenem  IVPB 500 milliGRAM(s) IV Intermittent every 24 hours  metoprolol tartrate 50 milliGRAM(s) Oral two times a day  sevelamer carbonate 800 milliGRAM(s) Oral three times a day    MEDICATIONS  (PRN):  acetaminophen     Tablet .. 650 milliGRAM(s) Oral every 6 hours PRN Temp greater or equal to 38C (100.4F), Mild Pain (1 - 3)  aluminum hydroxide/magnesium hydroxide/simethicone Suspension 30 milliLiter(s) Oral every 4 hours PRN Dyspepsia  dextrose Oral Gel 15 Gram(s) Oral once PRN Blood Glucose LESS THAN 70 milliGRAM(s)/deciliter  melatonin 3 milliGRAM(s) Oral at bedtime PRN Insomnia  ondansetron Injectable 4 milliGRAM(s) IV Push every 8 hours PRN Nausea and/or Vomiting    LAB/STUDIES:    10-15    132<L>  |  94<L>  |  53<H>  ----------------------------<  262<H>  4.9   |  26  |  5.4<HH>    Ca    8.0<L>      15 Oct 2022 19:27    TPro  6.4  /  Alb  2.5<L>  /  TBili  0.2  /  DBili  x   /  AST  34  /  ALT  17  /  AlkPhos  187<H>  10-15      LIVER FUNCTIONS - ( 15 Oct 2022 19:27 )  Alb: 2.5 g/dL / Pro: 6.4 g/dL / ALK PHOS: 187 U/L / ALT: 17 U/L / AST: 34 U/L / GGT: x           Culture - Blood (collected 15 Oct 2022 12:07)  Source: .Blood None  Preliminary Report (16 Oct 2022 21:01):    No growth to date.        IMAGING:  VA Duplex Lower Extrem Arterial, Bilat (09.15.22 @ 16:18)  Impression:    Mild to moderate peripheral arterial disease noted in the distal right   popliteal artery. Otherwise negative for flow-limiting stenosis.

## 2022-10-17 NOTE — PROGRESS NOTE ADULT - SUBJECTIVE AND OBJECTIVE BOX
Podiatry Progress Note    Subjective:  SCHWABACHER, LAWRENCE is a  62y Male.   Seen bedside.   Patient is a 62y old  Male who presents with a chief complaint of Sepsis (17 Oct 2022 08:59)      Past Medical History and Surgical History  PAST MEDICAL & SURGICAL HISTORY:  CAD (coronary artery disease)  1 stent 2008      S/P CABG (coronary artery bypass graft)  x4      Diabetes mellitus      Transient ischemic attack (TIA)  2017; 2008      Chronic kidney disease (CKD)  Stage IV      Hypertension      Stented coronary artery  in 2008      Myocardial infarction  2012      PAD (peripheral artery disease)  S/p bypass left leg      HLD (hyperlipidemia)      BPH (benign prostatic hyperplasia)      Pain in left knee  s/p fall      OA (osteoarthritis)      [place] (hard of hearing)      Chronic anemia      S/P CABG (coronary artery bypass graft)  2012      H/O arterial bypass of lower limb  Left Lower Extremity (2016)      History of surgery  Left CEA (2017)  Left Pinkie toe Amputation (2014)  CABG x 4 (2012)  Card cath - stent (2008)        AV fistula  2019  LEFT AV FISTULA           Objective:  Vital Signs Last 24 Hrs  T(C): 35.7 (17 Oct 2022 05:09), Max: 36.2 (16 Oct 2022 21:45)  T(F): 96.2 (17 Oct 2022 05:09), Max: 97.1 (16 Oct 2022 21:45)  HR: 78 (17 Oct 2022 12:30) (72 - 82)  BP: 153/80 (17 Oct 2022 12:30) (118/58 - 156/72)  BP(mean): --  RR: 18 (17 Oct 2022 12:30) (18 - 18)  SpO2: 98% (16 Oct 2022 21:45) (98% - 98%)    Parameters below as of 17 Oct 2022 12:30  Patient On (Oxygen Delivery Method): room air                    10-15    132<L>  |  94<L>  |  53<H>  ----------------------------<  262<H>  4.9   |  26  |  5.4<HH>    Ca    8.0<L>      15 Oct 2022 19:27    TPro  6.4  /  Alb  2.5<L>  /  TBili  0.2  /  DBili  x   /  AST  34  /  ALT  17  /  AlkPhos  187<H>  10-15      Physical Exam - Lower Extremity Focused:   #Left Lower Extremity  Derm:   Open Left Plantar Ulcer @ Plantar Medial Rearfoot;    -Wound Base Fibrogranular; 50% Fibrous, 50% Granular; unchanged;    -Wound Probes to Bone w/ Mild Serosanguinous Drainage;    -Wound Margins Irregular;  Skin to Plantar Heel Dry, Dark, Hard; Boggy to w/Palpation  Skin LLE Dry, Scaly;   Mild Cellulitis w/ Erythema of Left Lower Extremity;    Vascular: DP and PT Pulses Diminished; Foot is Tepid to Warm to the touch; Capillary Refill Delayed to the Toes;  Neuro: Protective Sensation Diminished / Moderately Neuropathic   MSK: Mild Charcot Deformity Left Foot; No Pain On Palpation at Wound Site     Assessment:  Left Plantar Rearfoot Ulcer - Probes to Bone  Dry Gangrene Left Plantar Heel  Cellulitic Left Lower Extremity     Plan:  Chart reviewed and Patient evaluated. All Questions and Concerns Addressed and Answered  Discussed diagnosis and treatment with patient  Wound Flushed w/ NS; Wound Packed w/ Iodoform; Dressed w/Betadine-soaked Gauze / DSD / Kerlix   Local Wound Care; As Stated Above   Deep Left Foot Wound Culture Obtained; Sent to Pathology Lab; Will f/u;  XR Imaging Left Foot;   MR Imaging LE Joint Left; Ordered; will f/u;  Lower Extremity Arterial Duplex B/L from 9/15/22; Mild to moderate peripheral arterial disease noted in the distal right popliteal artery; See Full Report Above;     -Vascular Consult Active; will f/u;  ESR/CRP;   ID Consult Active; WBC; 12.54; will f/u Recs;  Will reschedule SX for Friday 10/21/22 @ TBD ; Excisional Debridement of Soft Tissue & Bone, Left Foot;     -Patient on dual-anticoagulation (ASA, Plavix); needs 5d off Plavix prior to sx;  Will update final OR time later this week;   Request Medical Clearance;  Please optimize lab values prior to OR  Please make pt NPO @ [place] 10/20/22  Discussed Plan w/ Attending Dr. Pacheco     Podiatry

## 2022-10-17 NOTE — PROGRESS NOTE ADULT - SUBJECTIVE AND OBJECTIVE BOX
SCHWABACHER, LAWRENCE  62y, Male  Allergy: No Known Allergies      OVERNIGHT EVENTS:    Patient seen and examined this morning  lying comfortably in bed  in nad  no complaints  s/p HD and MRI today     VITALS:  Vital Signs Last 24 Hrs  T(C): 35.8 (17 Oct 2022 14:19), Max: 36.2 (16 Oct 2022 21:45)  T(F): 96.4 (17 Oct 2022 14:19), Max: 97.1 (16 Oct 2022 21:45)  HR: 78 (17 Oct 2022 14:19) (72 - 82)  BP: 150/67 (17 Oct 2022 14:19) (118/58 - 156/72)  BP(mean): --  RR: 18 (17 Oct 2022 14:19) (18 - 18)  SpO2: 98% (16 Oct 2022 21:45) (98% - 98%)    Parameters below as of 17 Oct 2022 12:30  Patient On (Oxygen Delivery Method): room air    PHYSICAL:  PHYSICAL EXAM:  GENERAL: NAD, well-groomed, well-developed  HEAD:  Atraumatic, Normocephalic  EYES: EOMI, PERRLA, conjunctiva and sclera clear  NERVOUS SYSTEM:  Alert & Oriented X 4, Good concentration; Moves all extremities   CHEST/LUNG: Clear to percussion bilaterally; No rales, rhonchi, wheezing, or rubs  HEART: Regular rate and rhythm; No murmurs, rubs, or gallops  ABDOMEN: Soft, Nontender, Nondistended; Bowel sounds present, obese  EXTREMITIES/SKIN:   LLE dressing is clean and in place.  Podiatry evaluation of plantar ulcers as noted   - deep ulcer mid-mod L plantar region with + probe to the bone   with dry gangrene L heel.    LABS: pending for 4pm                       8.2    12.91 )-----------( 329      ( 15 Oct 2022 09:53 )             25.2     PT/INR - ( 15 Oct 2022 09:53 )   PT: 13.90 sec;   INR: 1.21 ratio         PTT - ( 15 Oct 2022 09:53 )  PTT:30.9 sec  10-15    132<L>  |  94<L>  |  53<H>  ----------------------------<  262<H>  4.9   |  26  |  5.4<HH>    Ca    8.0<L>      15 Oct 2022 19:27    TPro  6.4  /  Alb  2.5<L>  /  TBili  0.2  /  DBili  x   /  AST  34  /  ALT  17  /  AlkPhos  187<H>  10-15    LIVER FUNCTIONS - ( 15 Oct 2022 19:27 )  Alb: 2.5 g/dL / Pro: 6.4 g/dL / ALK PHOS: 187 U/L / ALT: 17 U/L / AST: 34 U/L / GGT: x           Hb A1c 6.8%      CAPILLARY BLOOD GLUCOSE  POCT Blood Glucose.: 162 mg/dL (17 Oct 2022 12:53)  POCT Blood Glucose.: 188 mg/dL (17 Oct 2022 07:32)  POCT Blood Glucose.: 244 mg/dL (16 Oct 2022 21:30)  POCT Blood Glucose.: 204 mg/dL (16 Oct 2022 16:30)      MEDICATIONS:  MEDICATIONS  (STANDING):  aspirin enteric coated 81 milliGRAM(s) Oral daily  atorvastatin 40 milliGRAM(s) Oral at bedtime  dextrose 5%. 1000 milliLiter(s) (100 mL/Hr) IV Continuous <Continuous>  dextrose 5%. 1000 milliLiter(s) (50 mL/Hr) IV Continuous <Continuous>  dextrose 50% Injectable 25 Gram(s) IV Push once  dextrose 50% Injectable 12.5 Gram(s) IV Push once  dextrose 50% Injectable 25 Gram(s) IV Push once  glucagon  Injectable 1 milliGRAM(s) IntraMuscular once  heparin   Injectable 5000 Unit(s) SubCutaneous every 12 hours  influenza   Vaccine 0.5 milliLiter(s) IntraMuscular once  insulin glargine Injectable (LANTUS) 20 Unit(s) SubCutaneous at bedtime  insulin lispro (ADMELOG) corrective regimen sliding scale   SubCutaneous three times a day before meals  meropenem  IVPB 500 milliGRAM(s) IV Intermittent every 24 hours  metoprolol tartrate 50 milliGRAM(s) Oral two times a day  sevelamer carbonate 800 milliGRAM(s) Oral three times a day    MEDICATIONS  (PRN):  acetaminophen     Tablet .. 650 milliGRAM(s) Oral every 6 hours PRN Temp greater or equal to 38C (100.4F), Mild Pain (1 - 3)  aluminum hydroxide/magnesium hydroxide/simethicone Suspension 30 milliLiter(s) Oral every 4 hours PRN Dyspepsia  dextrose Oral Gel 15 Gram(s) Oral once PRN Blood Glucose LESS THAN 70 milliGRAM(s)/deciliter  melatonin 3 milliGRAM(s) Oral at bedtime PRN Insomnia  ondansetron Injectable 4 milliGRAM(s) IV Push every 8 hours PRN Nausea and/or Vomiting

## 2022-10-17 NOTE — PROGRESS NOTE ADULT - SUBJECTIVE AND OBJECTIVE BOX
Nephrology progress note    THIS IS AN INCOMPLETE NOTE . FULL NOTE TO FOLLOW SHORTLY    Patient is seen and examined, events over the last 24 h noted .    Allergies:  No Known Allergies    Hospital Medications:   MEDICATIONS  (STANDING):  aspirin enteric coated 81 milliGRAM(s) Oral daily  atorvastatin 40 milliGRAM(s) Oral at bedtime  dextrose 5%. 1000 milliLiter(s) (50 mL/Hr) IV Continuous <Continuous>  dextrose 5%. 1000 milliLiter(s) (100 mL/Hr) IV Continuous <Continuous>  dextrose 50% Injectable 25 Gram(s) IV Push once  dextrose 50% Injectable 12.5 Gram(s) IV Push once  dextrose 50% Injectable 25 Gram(s) IV Push once  glucagon  Injectable 1 milliGRAM(s) IntraMuscular once  heparin   Injectable 5000 Unit(s) SubCutaneous every 12 hours  influenza   Vaccine 0.5 milliLiter(s) IntraMuscular once  insulin glargine Injectable (LANTUS) 20 Unit(s) SubCutaneous at bedtime  insulin lispro (ADMELOG) corrective regimen sliding scale   SubCutaneous three times a day before meals  meropenem  IVPB 500 milliGRAM(s) IV Intermittent every 24 hours  metoprolol tartrate 50 milliGRAM(s) Oral two times a day  sevelamer carbonate 800 milliGRAM(s) Oral three times a day        VITALS:  T(F): 96.2 (10-17-22 @ 05:09), Max: 97.4 (10-16-22 @ 12:49)  HR: 82 (10-17-22 @ 05:09)  BP: 156/72 (10-17-22 @ 05:09)  RR: 18 (10-17-22 @ 05:09)  SpO2: 98% (10-16-22 @ 21:45)  Wt(kg): --        PHYSICAL EXAM:  Constitutional: NAD  HEENT: anicteric sclera, oropharynx clear, MMM  Neck: No JVD  Respiratory: CTAB, no wheezes, rales or rhonchi  Cardiovascular: S1, S2, RRR  Gastrointestinal: BS+, soft, NT/ND  Extremities: No cyanosis or clubbing. No peripheral edema  :  No schneider.   Skin: No rashes    LABS:  10-15    132<L>  |  94<L>  |  53<H>  ----------------------------<  262<H>  4.9   |  26  |  5.4<HH>    Ca    8.0<L>      15 Oct 2022 19:27    TPro  6.4  /  Alb  2.5<L>  /  TBili  0.2  /  DBili      /  AST  34  /  ALT  17  /  AlkPhos  187<H>  10-15                          8.2    12.91 )-----------( 329      ( 15 Oct 2022 09:53 )             25.2       Urine Studies:        PTH -- (Ca 7.5)      [02-26-22 @ 16:00]   312  Vitamin D (25OH) 21      [02-26-22 @ 16:00]  HbA1c 5.8      [01-30-20 @ 06:46]  Lipid: chol 81, , HDL 22, LDL --      [10-15-22 @ 09:53]    HBsAb <3.0      [12-29-19 @ 17:44]  HBsAb Nonreact      [12-29-19 @ 17:44]  HBsAg Nonreact      [12-29-19 @ 17:44]  HBcAb Nonreact      [12-29-19 @ 17:44]  HCV 0.10, Nonreact      [12-29-19 @ 17:44]        RADIOLOGY & ADDITIONAL STUDIES:   Nephrology progress note    Patient is seen and examined, events over the last 24 h noted .  Lying in bed comfortable     Allergies:  No Known Allergies    Hospital Medications:   MEDICATIONS  (STANDING):    aspirin enteric coated 81 milliGRAM(s) Oral daily  atorvastatin 40 milliGRAM(s) Oral at bedtime  dextrose 5%. 1000 milliLiter(s) (50 mL/Hr) IV Continuous <Continuous>  dextrose 5%. 1000 milliLiter(s) (100 mL/Hr) IV Continuous <Continuous>  glucagon  Injectable 1 milliGRAM(s) IntraMuscular once  heparin   Injectable 5000 Unit(s) SubCutaneous every 12 hours  influenza   Vaccine 0.5 milliLiter(s) IntraMuscular once  insulin glargine Injectable (LANTUS) 20 Unit(s) SubCutaneous at bedtime  insulin lispro (ADMELOG) corrective regimen sliding scale   SubCutaneous three times a day before meals  meropenem  IVPB 500 milliGRAM(s) IV Intermittent every 24 hours  metoprolol tartrate 50 milliGRAM(s) Oral two times a day  sevelamer carbonate 800 milliGRAM(s) Oral three times a day        VITALS:  T(F): 96.2 (10-17-22 @ 05:09), Max: 97.4 (10-16-22 @ 12:49)  HR: 82 (10-17-22 @ 05:09)  BP: 156/72 (10-17-22 @ 05:09)  RR: 18 (10-17-22 @ 05:09)  SpO2: 98% (10-16-22 @ 21:45)    PHYSICAL EXAM:  Constitutional: NAD  Neck: No JVD  Respiratory: CTAB  Cardiovascular: S1, S2, RRR  Gastrointestinal: BS+, soft, NT/ND  Extremities: No cyanosis or clubbing. No peripheral edema  :  No schneider.   Skin: No rashes    LABS:      10-15    132<L>  |  94<L>  |  53<H>  ----------------------------<  262<H>  4.9   |  26  |  5.4<HH>    Ca    8.0<L>      15 Oct 2022 19:27    TPro  6.4  /  Alb  2.5<L>  /  TBili  0.2  /  DBili      /  AST  34  /  ALT  17  /  AlkPhos  187<H>  10-15                          8.2    12.91 )-----------( 329      ( 15 Oct 2022 09:53 )             25.2       Urine Studies:        PTH -- (Ca 7.5)      [02-26-22 @ 16:00]   312  Vitamin D (25OH) 21      [02-26-22 @ 16:00]  HbA1c 5.8      [01-30-20 @ 06:46]  Lipid: chol 81, , HDL 22, LDL --      [10-15-22 @ 09:53]    HBsAb <3.0      [12-29-19 @ 17:44]  HBsAb Nonreact      [12-29-19 @ 17:44]  HBsAg Nonreact      [12-29-19 @ 17:44]  HBcAb Nonreact      [12-29-19 @ 17:44]  HCV 0.10, Nonreact      [12-29-19 @ 17:44]        RADIOLOGY & ADDITIONAL STUDIES:

## 2022-10-18 LAB
-  AMIKACIN: SIGNIFICANT CHANGE UP
-  AMOXICILLIN/CLAVULANIC ACID: SIGNIFICANT CHANGE UP
-  AMPICILLIN/SULBACTAM: SIGNIFICANT CHANGE UP
-  AMPICILLIN: SIGNIFICANT CHANGE UP
-  AZTREONAM: SIGNIFICANT CHANGE UP
-  CEFAZOLIN: SIGNIFICANT CHANGE UP
-  CEFEPIME: SIGNIFICANT CHANGE UP
-  CEFOXITIN: SIGNIFICANT CHANGE UP
-  CEFTRIAXONE: SIGNIFICANT CHANGE UP
-  CIPROFLOXACIN: SIGNIFICANT CHANGE UP
-  DAPTOMYCIN: SIGNIFICANT CHANGE UP
-  ERTAPENEM: SIGNIFICANT CHANGE UP
-  GENTAMICIN: SIGNIFICANT CHANGE UP
-  IMIPENEM: SIGNIFICANT CHANGE UP
-  IMIPENEM: SIGNIFICANT CHANGE UP
-  LEVOFLOXACIN: SIGNIFICANT CHANGE UP
-  LINEZOLID: SIGNIFICANT CHANGE UP
-  MEROPENEM: SIGNIFICANT CHANGE UP
-  PIPERACILLIN/TAZOBACTAM: SIGNIFICANT CHANGE UP
-  TETRACYCLINE: SIGNIFICANT CHANGE UP
-  TOBRAMYCIN: SIGNIFICANT CHANGE UP
-  TRIMETHOPRIM/SULFAMETHOXAZOLE: SIGNIFICANT CHANGE UP
-  VANCOMYCIN: SIGNIFICANT CHANGE UP
ALBUMIN SERPL ELPH-MCNC: 2.3 G/DL — LOW (ref 3.5–5.2)
ALP SERPL-CCNC: 144 U/L — HIGH (ref 30–115)
ALT FLD-CCNC: 9 U/L — SIGNIFICANT CHANGE UP (ref 0–41)
ANION GAP SERPL CALC-SCNC: 13 MMOL/L — SIGNIFICANT CHANGE UP (ref 7–14)
APTT BLD: 30.6 SEC — SIGNIFICANT CHANGE UP (ref 27–39.2)
APTT BLD: 31 SEC — SIGNIFICANT CHANGE UP (ref 27–39.2)
AST SERPL-CCNC: 16 U/L — SIGNIFICANT CHANGE UP (ref 0–41)
BILIRUB SERPL-MCNC: 0.3 MG/DL — SIGNIFICANT CHANGE UP (ref 0.2–1.2)
BLD GP AB SCN SERPL QL: SIGNIFICANT CHANGE UP
BUN SERPL-MCNC: 43 MG/DL — HIGH (ref 10–20)
CALCIUM SERPL-MCNC: 7.9 MG/DL — LOW (ref 8.4–10.5)
CHLORIDE SERPL-SCNC: 97 MMOL/L — LOW (ref 98–110)
CO2 SERPL-SCNC: 25 MMOL/L — SIGNIFICANT CHANGE UP (ref 17–32)
CREAT SERPL-MCNC: 4.9 MG/DL — CRITICAL HIGH (ref 0.7–1.5)
EGFR: 12 ML/MIN/1.73M2 — LOW
GLUCOSE BLDC GLUCOMTR-MCNC: 262 MG/DL — HIGH (ref 70–99)
GLUCOSE BLDC GLUCOMTR-MCNC: 61 MG/DL — LOW (ref 70–99)
GLUCOSE BLDC GLUCOMTR-MCNC: 65 MG/DL — LOW (ref 70–99)
GLUCOSE BLDC GLUCOMTR-MCNC: 82 MG/DL — SIGNIFICANT CHANGE UP (ref 70–99)
GLUCOSE BLDC GLUCOMTR-MCNC: 98 MG/DL — SIGNIFICANT CHANGE UP (ref 70–99)
GLUCOSE SERPL-MCNC: 58 MG/DL — LOW (ref 70–99)
HCT VFR BLD CALC: 25 % — LOW (ref 42–52)
HGB BLD-MCNC: 8.1 G/DL — LOW (ref 14–18)
INR BLD: 1.06 RATIO — SIGNIFICANT CHANGE UP (ref 0.65–1.3)
INR BLD: 1.09 RATIO — SIGNIFICANT CHANGE UP (ref 0.65–1.3)
MAGNESIUM SERPL-MCNC: 1.9 MG/DL — SIGNIFICANT CHANGE UP (ref 1.8–2.4)
MCHC RBC-ENTMCNC: 31.8 PG — HIGH (ref 27–31)
MCHC RBC-ENTMCNC: 32.4 G/DL — SIGNIFICANT CHANGE UP (ref 32–37)
MCV RBC AUTO: 98 FL — HIGH (ref 80–94)
METHOD TYPE: SIGNIFICANT CHANGE UP
NRBC # BLD: 0 /100 WBCS — SIGNIFICANT CHANGE UP (ref 0–0)
PHOSPHATE SERPL-MCNC: 3 MG/DL — SIGNIFICANT CHANGE UP (ref 2.1–4.9)
PLATELET # BLD AUTO: 409 K/UL — HIGH (ref 130–400)
POTASSIUM SERPL-MCNC: 4.6 MMOL/L — SIGNIFICANT CHANGE UP (ref 3.5–5)
POTASSIUM SERPL-SCNC: 4.6 MMOL/L — SIGNIFICANT CHANGE UP (ref 3.5–5)
PROT SERPL-MCNC: 6.3 G/DL — SIGNIFICANT CHANGE UP (ref 6–8)
PROTHROM AB SERPL-ACNC: 12.1 SEC — SIGNIFICANT CHANGE UP (ref 9.95–12.87)
PROTHROM AB SERPL-ACNC: 12.5 SEC — SIGNIFICANT CHANGE UP (ref 9.95–12.87)
RBC # BLD: 2.55 M/UL — LOW (ref 4.7–6.1)
RBC # FLD: 15.8 % — HIGH (ref 11.5–14.5)
SARS-COV-2 RNA SPEC QL NAA+PROBE: SIGNIFICANT CHANGE UP
SODIUM SERPL-SCNC: 135 MMOL/L — SIGNIFICANT CHANGE UP (ref 135–146)
WBC # BLD: 9.89 K/UL — SIGNIFICANT CHANGE UP (ref 4.8–10.8)
WBC # FLD AUTO: 9.89 K/UL — SIGNIFICANT CHANGE UP (ref 4.8–10.8)

## 2022-10-18 PROCEDURE — 99233 SBSQ HOSP IP/OBS HIGH 50: CPT

## 2022-10-18 PROCEDURE — 71045 X-RAY EXAM CHEST 1 VIEW: CPT | Mod: 26

## 2022-10-18 RX ORDER — DEXTROSE 10 % IN WATER 10 %
125 INTRAVENOUS SOLUTION INTRAVENOUS ONCE
Refills: 0 | Status: COMPLETED | OUTPATIENT
Start: 2022-10-18 | End: 2022-10-18

## 2022-10-18 RX ORDER — INSULIN GLARGINE 100 [IU]/ML
15 INJECTION, SOLUTION SUBCUTANEOUS AT BEDTIME
Refills: 0 | Status: DISCONTINUED | OUTPATIENT
Start: 2022-10-18 | End: 2022-10-21

## 2022-10-18 RX ORDER — SODIUM CHLORIDE 9 MG/ML
1000 INJECTION, SOLUTION INTRAVENOUS
Refills: 0 | Status: DISCONTINUED | OUTPATIENT
Start: 2022-10-18 | End: 2022-10-18

## 2022-10-18 RX ORDER — SODIUM CHLORIDE 9 MG/ML
1000 INJECTION, SOLUTION INTRAVENOUS
Refills: 0 | Status: DISCONTINUED | OUTPATIENT
Start: 2022-10-18 | End: 2022-10-21

## 2022-10-18 RX ORDER — DEXTROSE 10 % IN WATER 10 %
125 INTRAVENOUS SOLUTION INTRAVENOUS ONCE
Refills: 0 | Status: DISCONTINUED | OUTPATIENT
Start: 2022-10-18 | End: 2022-10-21

## 2022-10-18 RX ADMIN — ATORVASTATIN CALCIUM 40 MILLIGRAM(S): 80 TABLET, FILM COATED ORAL at 21:50

## 2022-10-18 RX ADMIN — HEPARIN SODIUM 5000 UNIT(S): 5000 INJECTION INTRAVENOUS; SUBCUTANEOUS at 17:34

## 2022-10-18 RX ADMIN — HEPARIN SODIUM 5000 UNIT(S): 5000 INJECTION INTRAVENOUS; SUBCUTANEOUS at 05:52

## 2022-10-18 RX ADMIN — Medication 375 MILLILITER(S): at 09:16

## 2022-10-18 RX ADMIN — INSULIN GLARGINE 15 UNIT(S): 100 INJECTION, SOLUTION SUBCUTANEOUS at 21:51

## 2022-10-18 RX ADMIN — Medication 50 MILLIGRAM(S): at 17:34

## 2022-10-18 RX ADMIN — MEROPENEM 100 MILLIGRAM(S): 1 INJECTION INTRAVENOUS at 06:01

## 2022-10-18 RX ADMIN — SEVELAMER CARBONATE 800 MILLIGRAM(S): 2400 POWDER, FOR SUSPENSION ORAL at 21:50

## 2022-10-18 RX ADMIN — Medication 50 MILLIGRAM(S): at 05:51

## 2022-10-18 RX ADMIN — SODIUM CHLORIDE 50 MILLILITER(S): 9 INJECTION, SOLUTION INTRAVENOUS at 09:43

## 2022-10-18 NOTE — PROGRESS NOTE ADULT - SUBJECTIVE AND OBJECTIVE BOX
LAWRENCE SCHWABACHER 62y Male  MRN#: [id number]   Hospital Day: 4d    SUBJECTIVE  Patient is a 62y old Male who presents with a chief complaint of Sepsis (18 Oct 2022 07:22)  Currently admitted to medicine with the primary diagnosis of   INTERVAL HPI AND OVERNIGHT EVENTS:  Patient was examined and seen at bedside. This morning he is resting comfortably in bed and reports no issues or overnight events. Minimal leg pain. Patient seen by vascular and pending angiogram/angioplasty today, has been npo since MN, pre-op labs done. Not in pain this AM, surgical dressing changed recently by podiatry. Afebrile, VSS. Patient was noted to be hypoglycemic this AM after being NPO and receiving Lantus overnight (FS 60s), D10 bolus was given and D5 fluids started. Patient feels slightly shaky, but no nausea, dizziness, vomiting, sob, mental status remains unchanged. No other acute events.     REVIEW OF SYMPTOMS:  Relevant ros per above     OBJECTIVE  PAST MEDICAL & SURGICAL HISTORY  CAD (coronary artery disease)  1 stent 2008    S/P CABG (coronary artery bypass graft)  x4    Diabetes mellitus    Transient ischemic attack (TIA)  2017; 2008    Chronic kidney disease (CKD)  Stage IV    Hypertension    Stented coronary artery  in 2008    Myocardial infarction  2012    PAD (peripheral artery disease)  S/p bypass left leg    HLD (hyperlipidemia)    BPH (benign prostatic hyperplasia)    Pain in left knee  s/p fall    OA (osteoarthritis)    [place] (hard of hearing)    Chronic anemia    S/P CABG (coronary artery bypass graft)  2012    H/O arterial bypass of lower limb  Left Lower Extremity (2016)    History of surgery  Left CEA (2017)  Left Pinkie toe Amputation (2014)  CABG x 4 (2012)  Card cath - stent (2008)      AV fistula  2019  LEFT AV FISTULA      ALLERGIES:  No Known Allergies    MEDICATIONS:  STANDING MEDICATIONS  aspirin enteric coated 81 milliGRAM(s) Oral daily  atorvastatin 40 milliGRAM(s) Oral at bedtime  dextrose 10% Bolus 125 milliLiter(s) IV Bolus once  dextrose 5%. 1000 milliLiter(s) IV Continuous <Continuous>  dextrose 5%. 1000 milliLiter(s) IV Continuous <Continuous>  dextrose 5%. 1000 milliLiter(s) IV Continuous <Continuous>  dextrose 5%. 1000 milliLiter(s) IV Continuous <Continuous>  dextrose 50% Injectable 25 Gram(s) IV Push once  dextrose 50% Injectable 12.5 Gram(s) IV Push once  dextrose 50% Injectable 25 Gram(s) IV Push once  glucagon  Injectable 1 milliGRAM(s) IntraMuscular once  heparin   Injectable 5000 Unit(s) SubCutaneous every 12 hours  influenza   Vaccine 0.5 milliLiter(s) IntraMuscular once  insulin glargine Injectable (LANTUS) 20 Unit(s) SubCutaneous at bedtime  insulin lispro (ADMELOG) corrective regimen sliding scale   SubCutaneous three times a day before meals  meropenem  IVPB 500 milliGRAM(s) IV Intermittent every 24 hours  metoprolol tartrate 50 milliGRAM(s) Oral two times a day  sevelamer carbonate 800 milliGRAM(s) Oral three times a day    PRN MEDICATIONS  acetaminophen     Tablet .. 650 milliGRAM(s) Oral every 6 hours PRN  aluminum hydroxide/magnesium hydroxide/simethicone Suspension 30 milliLiter(s) Oral every 4 hours PRN  dextrose Oral Gel 15 Gram(s) Oral once PRN  melatonin 3 milliGRAM(s) Oral at bedtime PRN  ondansetron Injectable 4 milliGRAM(s) IV Push every 8 hours PRN      VITAL SIGNS: Last 24 Hours  T(C): 36.5 (18 Oct 2022 05:22), Max: 36.5 (18 Oct 2022 05:22)  T(F): 97.7 (18 Oct 2022 05:22), Max: 97.7 (18 Oct 2022 05:22)  HR: 79 (18 Oct 2022 05:22) (78 - 85)  BP: 146/65 (18 Oct 2022 05:22) (143/66 - 153/80)  BP(mean): --  RR: 18 (18 Oct 2022 05:22) (18 - 18)  SpO2: 97% (17 Oct 2022 21:17) (97% - 97%)    LABS:                        8.1    9.89  )-----------( 409      ( 18 Oct 2022 06:24 )             25.0     10-18    135  |  97<L>  |  43<H>  ----------------------------<  58<L>  4.6   |  25  |  4.9<HH>    Ca    7.9<L>      18 Oct 2022 06:24  Phos  3.0     10-18  Mg     1.9     10-18    TPro  6.3  /  Alb  2.3<L>  /  TBili  0.3  /  DBili  x   /  AST  16  /  ALT  9   /  AlkPhos  144<H>  10-18    PT/INR - ( 18 Oct 2022 06:24 )   PT: 12.50 sec;   INR: 1.09 ratio         PTT - ( 18 Oct 2022 06:24 )  PTT:30.6 sec          Culture - Abscess with Gram Stain (collected 16 Oct 2022 17:50)  Source: .Abscess left foot wound  Preliminary Report (17 Oct 2022 22:51):    Rare Morganella morganii    Rare Enterobacter cloacae complex    Rare Proteus vulgaris group    Culture - Blood (collected 15 Oct 2022 12:07)  Source: .Blood None  Preliminary Report (16 Oct 2022 21:01):    No growth to date.          RADIOLOGY:      PHYSICAL EXAM:  CONSTITUTIONAL: No acute distress, well-developed, well-groomed, AAOx3  HEAD: Atraumatic, normocephalic  EYES: EOM intact, conjunctiva and sclera clear, non-injected, anicteric  ENT: Supple, no masses, no thyromegaly, no bruits, no JVD; moist mucous membranes  PULMONARY: Clear to auscultation bilaterally; no wheezes, rales, or rhonchi  CARDIOVASCULAR: Regular rate and rhythm; no murmurs, rubs, or gallops  GASTROINTESTINAL: Soft, non-tender, + increased resonance to percussion, obese habitus; bowel sounds present  MUSCULOSKELETAL: 2+ peripheral pulses; no clubbing, no cyanosis, no edema, surgical wrap in place over L foot, non-tender to palpation  NEUROLOGY: non-focal, moving extremities, normal strength, no facial droop  SKIN: No rashes or lesions; warm and dry

## 2022-10-18 NOTE — PROGRESS NOTE ADULT - SUBJECTIVE AND OBJECTIVE BOX
seen and examined  no distress   lying comfortable         PAST HISTORY  --------------------------------------------------------------------------------  No significant changes to PMH, PSH, FHx, SHx, unless otherwise noted    ALLERGIES & MEDICATIONS  --------------------------------------------------------------------------------  Allergies    No Known Allergies    Intolerances      Standing Inpatient Medications  aspirin enteric coated 81 milliGRAM(s) Oral daily  atorvastatin 40 milliGRAM(s) Oral at bedtime  dextrose 5%. 1000 milliLiter(s) IV Continuous <Continuous>  dextrose 5%. 1000 milliLiter(s) IV Continuous <Continuous>  dextrose 50% Injectable 25 Gram(s) IV Push once  dextrose 50% Injectable 12.5 Gram(s) IV Push once  dextrose 50% Injectable 25 Gram(s) IV Push once  glucagon  Injectable 1 milliGRAM(s) IntraMuscular once  heparin   Injectable 5000 Unit(s) SubCutaneous every 12 hours  influenza   Vaccine 0.5 milliLiter(s) IntraMuscular once  insulin glargine Injectable (LANTUS) 20 Unit(s) SubCutaneous at bedtime  insulin lispro (ADMELOG) corrective regimen sliding scale   SubCutaneous three times a day before meals  meropenem  IVPB 500 milliGRAM(s) IV Intermittent every 24 hours  metoprolol tartrate 50 milliGRAM(s) Oral two times a day  sevelamer carbonate 800 milliGRAM(s) Oral three times a day    PRN Inpatient Medications  acetaminophen     Tablet .. 650 milliGRAM(s) Oral every 6 hours PRN  aluminum hydroxide/magnesium hydroxide/simethicone Suspension 30 milliLiter(s) Oral every 4 hours PRN  dextrose Oral Gel 15 Gram(s) Oral once PRN  melatonin 3 milliGRAM(s) Oral at bedtime PRN  ondansetron Injectable 4 milliGRAM(s) IV Push every 8 hours PRN      VITALS/PHYSICAL EXAM  --------------------------------------------------------------------------------  T(C): 36.5 (10-18-22 @ 05:22), Max: 36.5 (10-18-22 @ 05:22)  HR: 79 (10-18-22 @ 05:22) (75 - 85)  BP: 146/65 (10-18-22 @ 05:22) (142/74 - 153/80)  RR: 18 (10-18-22 @ 05:22) (18 - 18)  SpO2: 97% (10-17-22 @ 21:17) (97% - 97%)  Wt(kg): --        10-17-22 @ 07:01  -  10-18-22 @ 07:00  --------------------------------------------------------  IN: 0 mL / OUT: 3000 mL / NET: -3000 mL      Physical Exam:  	Gen: NAD,   	Pulm: CTA B/L  	CV:  S1S2; no rub  	Abd:  soft, nontender/nondistended  	LE: dressing   	Vascular access:av     LABS/STUDIES  --------------------------------------------------------------------------------              8.4    9.09  >-----------<  382      [10-17-22 @ 17:35]              25.2     135  |  98  |  40  ----------------------------<  164      [10-17-22 @ 17:35]  4.4   |  25  |  4.2        Ca     7.7     [10-17-22 @ 17:35]      Mg     1.9     [10-17-22 @ 17:35]    TPro  6.3  /  Alb  2.6  /  TBili  0.2  /  DBili  x   /  AST  17  /  ALT  11  /  AlkPhos  170  [10-17-22 @ 17:35]    Creatinine Trend:  SCr 4.2 [10-17 @ 17:35]  SCr 5.4 [10-15 @ 19:27]  SCr 5.0 [09-20 @ 08:11]  SCr 7.0 [09-19 @ 09:26]  SCr 5.9 [09-18 @ 09:18]        PTH -- (Ca 7.5)      [02-26-22 @ 16:00]   312  Vitamin D (25OH) 21      [02-26-22 @ 16:00]  HbA1c 5.8      [01-30-20 @ 06:46]  Lipid: chol 81, , HDL 22, LDL --      [10-15-22 @ 09:53]

## 2022-10-18 NOTE — PROGRESS NOTE ADULT - ASSESSMENT
63 y/o with a PMHx of DM, HTN, ESRD (on dialysis M/W/F, last session on 10/14), CAD s/p 1 stent on 2008 and quadruple CABG on 2012, PAD s/p bilateral angioplasty at [facility], was transferred from [facility] complaining of weakness and unhealed wound in the left foot. Patient was evaluated by vascular surgery and podiatry at [facility], who recommended debriding the L foot wound. Patient requested to be transferred to [facility] where his vascular surgeons are.  # s/p hd yesterday , hd in am   # seen by ID on meropenem   # podiatry  and vascular notes appreciated / angio today   # BP well controlled   #on binders/ check ph level / repeat BMP   # check iron stores / RAMSEY with next  HD   # will follow

## 2022-10-18 NOTE — PROGRESS NOTE ADULT - ASSESSMENT
63 y/o with a PMHx of DM, HTN, ESRD (on dialysis M/W/F, last session on 10/14), CAD s/p 1 stent on 2008 and quadruple CABG on 2012, PAD s/p bilateral angioplasty at [facility], was transferred from [facility] complaining of weakness and unhealed wound in the left foot.    #Left DFU, suspected OM  #PAD  - Podiatry following - must be off plavix for 5 days - last dose on 10/16  - ID following - currently on sharlene   - vascular and cardio requested by podiatry  - vascular planning on angio today    - follow up cultures   - MRI L foot done - IMPRESSION:  Osteomyelitis and erosion of the calcaneus with a vertical pathologic   fracture extending to the subtalar joint. Gangrene of overlying   subcutaneous tissues. Likely septic arthritis of the tibiotalar and   subtalar joints and early osteomyelitis in the talus.    #ESRD on HD  - HD as per nephrology   - renal following    #CAD  - on statin   - hold asa 3 days prior to procedure  - holding plavix since 10/16  - continue BB    #DM II  - hypoglycemic this am  - decrease lantus to 15 units tonight     Progress Note Handoff  Pending Consults: cardio  Pending Tests: labs  Pending Results: labs  Family Discussion: discussed HD, labs, MRI, angio, OR and consults with pt and medical staff in detail. All questions answered.  Disposition: Home_____/SNF______/Other_____/Unknown at this time__x___  Spent over 35 min reviewing chart and on coordinating patient care during interdisciplinary rounds

## 2022-10-18 NOTE — PROGRESS NOTE ADULT - SUBJECTIVE AND OBJECTIVE BOX
SCHWABACHER, LAWRENCE  62y, Male  Allergy: No Known Allergies      OVERNIGHT EVENTS:    Patient seen and examined this morning  lying comfortably in bed  in nad  no complaints  found to be hypoglycemic today - improved with D50 push and started on D5W    VITALS:  Vital Signs Last 24 Hrs  T(C): 36.5 (18 Oct 2022 05:22), Max: 36.5 (18 Oct 2022 05:22)  T(F): 97.7 (18 Oct 2022 05:22), Max: 97.7 (18 Oct 2022 05:22)  HR: 79 (18 Oct 2022 05:22) (78 - 85)  BP: 146/65 (18 Oct 2022 05:22) (143/66 - 153/80)  BP(mean): --  RR: 18 (18 Oct 2022 05:22) (18 - 18)  SpO2: 97% (17 Oct 2022 21:17) (97% - 97%)    Parameters below as of 17 Oct 2022 21:17  Patient On (Oxygen Delivery Method): room air      PHYSICAL:  PHYSICAL EXAM:  GENERAL: NAD, well-groomed, well-developed  HEAD:  Atraumatic, Normocephalic  EYES: EOMI, PERRLA, conjunctiva and sclera clear  NERVOUS SYSTEM:  Alert & Oriented X 4, Good concentration; Moves all extremities   CHEST/LUNG: Clear to percussion bilaterally; No rales, rhonchi, wheezing, or rubs  HEART: Regular rate and rhythm; No murmurs, rubs, or gallops  ABDOMEN: Soft, Nontender, Nondistended; Bowel sounds present, obese  EXTREMITIES/SKIN:   LLE dressing is clean and in place.  Podiatry evaluation of plantar ulcers as noted   - deep ulcer mid-mod L plantar region with + probe to the bone   with dry gangrene L heel.    LABS:     COVID-19 PCR: NotDetec (17 Oct 2022 20:19)                          8.1    9.89  )-----------( 409      ( 18 Oct 2022 06:24 )             25.0       10-18    135  |  97<L>  |  43<H>  ----------------------------<  58<L>  4.6   |  25  |  4.9<HH>    Ca    7.9<L>      18 Oct 2022 06:24  Phos  3.0     10-18  Mg     1.9     10-18    TPro  6.3  /  Alb  2.3<L>  /  TBili  0.3  /  DBili  x   /  AST  16  /  ALT  9   /  AlkPhos  144<H>  10-18      Hb A1c 6.8%      CAPILLARY BLOOD GLUCOSE  POCT Blood Glucose.: 82 mg/dL (18 Oct 2022 11:15)  POCT Blood Glucose.: 61 mg/dL (18 Oct 2022 08:47)  POCT Blood Glucose.: 65 mg/dL (18 Oct 2022 07:20)  POCT Blood Glucose.: 196 mg/dL (17 Oct 2022 20:58)  POCT Blood Glucose.: 173 mg/dL (17 Oct 2022 16:45)  POCT Blood Glucose.: 162 mg/dL (17 Oct 2022 12:53)      MEDICATIONS:  MEDICATIONS  (STANDING):  atorvastatin 40 milliGRAM(s) Oral at bedtime  dextrose 10% Bolus 125 milliLiter(s) IV Bolus once  dextrose 5%. 1000 milliLiter(s) (50 mL/Hr) IV Continuous <Continuous>  dextrose 5%. 1000 milliLiter(s) (75 mL/Hr) IV Continuous <Continuous>  dextrose 5%. 1000 milliLiter(s) (100 mL/Hr) IV Continuous <Continuous>  dextrose 5%. 1000 milliLiter(s) (50 mL/Hr) IV Continuous <Continuous>  dextrose 50% Injectable 25 Gram(s) IV Push once  dextrose 50% Injectable 12.5 Gram(s) IV Push once  dextrose 50% Injectable 25 Gram(s) IV Push once  glucagon  Injectable 1 milliGRAM(s) IntraMuscular once  heparin   Injectable 5000 Unit(s) SubCutaneous every 12 hours  influenza   Vaccine 0.5 milliLiter(s) IntraMuscular once  insulin glargine Injectable (LANTUS) 20 Unit(s) SubCutaneous at bedtime  insulin lispro (ADMELOG) corrective regimen sliding scale   SubCutaneous three times a day before meals  meropenem  IVPB 500 milliGRAM(s) IV Intermittent every 24 hours  metoprolol tartrate 50 milliGRAM(s) Oral two times a day  sevelamer carbonate 800 milliGRAM(s) Oral three times a day    MEDICATIONS  (PRN):  acetaminophen     Tablet .. 650 milliGRAM(s) Oral every 6 hours PRN Temp greater or equal to 38C (100.4F), Mild Pain (1 - 3)  aluminum hydroxide/magnesium hydroxide/simethicone Suspension 30 milliLiter(s) Oral every 4 hours PRN Dyspepsia  dextrose Oral Gel 15 Gram(s) Oral once PRN Blood Glucose LESS THAN 70 milliGRAM(s)/deciliter  melatonin 3 milliGRAM(s) Oral at bedtime PRN Insomnia  ondansetron Injectable 4 milliGRAM(s) IV Push every 8 hours PRN Nausea and/or Vomiting

## 2022-10-18 NOTE — PROGRESS NOTE ADULT - ASSESSMENT
· Assessment	   63 y/o with a PMHx of DM, HTN, ESRD (on dialysis M/W/F, last session on 10/14), CAD s/p 1 stent on 2008 and quadruple CABG on 2012, PAD s/p bilateral angioplasty at [facility], was transferred from [facility] complaining of weakness and unhealed wound in the left foot.    #Left Foot wound, likely OM  - Podiatry consult  - ID consult appreciated  - Blood cultures  - X-ray L foot  - MRI L foot  - Wound cx from 9/16 positive for Enterobacter cloacae, patient was treated with 2x weeks Post-HD Vanc + cefepime   - ESBL E coli bacteremia from [facility] cultures  - Per ID - c/w IV Meropenem 500mg  - Podiatry consult appreciated, scheduled for OR on 10/21 debridement, revascularization anticipated today 10/18  - Will need medical clearance and risk stratification for OR  - F/u labs      #Leg weakness likely secondary to PAD  - Vascular Sx c/s, will get lower extremity arterial duplex (most recent one in september)  - Vascular planning for angio +/- angioplasty today of LLE, will f/u post-op    #ESRD  - Last dialysis on Saturday (10/14)  - Nephro consult     #CAD  - c/w statin  - Holding ASA starting today 10/18th (3 days prior to OR on Friday 10/21)  - Holding Plavix currently (held 5 days prior to OR)  - c/w Lopressor 50mg  - c/w Lasix 40mg PO BID  - Cardio c/s pre-op clearance    #DM  - Obtain height/weight   - start Insulin sliding scale   - Pt takes 36 units of Lispro at home and trulicity 1/annette    #MISC  - Diet: DASH  - Activity: Ambulate as tolerated   - DVT PPX: SQ Heparin bid

## 2022-10-19 LAB
ALBUMIN SERPL ELPH-MCNC: 2.3 G/DL — LOW (ref 3.5–5.2)
ALBUMIN SERPL ELPH-MCNC: 2.6 G/DL — LOW (ref 3.5–5.2)
ALP SERPL-CCNC: 143 U/L — HIGH (ref 30–115)
ALP SERPL-CCNC: 169 U/L — HIGH (ref 30–115)
ALT FLD-CCNC: 6 U/L — SIGNIFICANT CHANGE UP (ref 0–41)
ALT FLD-CCNC: 7 U/L — SIGNIFICANT CHANGE UP (ref 0–41)
ANION GAP SERPL CALC-SCNC: 10 MMOL/L — SIGNIFICANT CHANGE UP (ref 7–14)
ANION GAP SERPL CALC-SCNC: 11 MMOL/L — SIGNIFICANT CHANGE UP (ref 7–14)
AST SERPL-CCNC: 12 U/L — SIGNIFICANT CHANGE UP (ref 0–41)
AST SERPL-CCNC: 14 U/L — SIGNIFICANT CHANGE UP (ref 0–41)
BILIRUB SERPL-MCNC: 0.2 MG/DL — SIGNIFICANT CHANGE UP (ref 0.2–1.2)
BILIRUB SERPL-MCNC: 0.2 MG/DL — SIGNIFICANT CHANGE UP (ref 0.2–1.2)
BUN SERPL-MCNC: 33 MG/DL — HIGH (ref 10–20)
BUN SERPL-MCNC: 53 MG/DL — HIGH (ref 10–20)
CALCIUM SERPL-MCNC: 8 MG/DL — LOW (ref 8.4–10.5)
CALCIUM SERPL-MCNC: 8.3 MG/DL — LOW (ref 8.4–10.5)
CHLORIDE SERPL-SCNC: 96 MMOL/L — LOW (ref 98–110)
CHLORIDE SERPL-SCNC: 97 MMOL/L — LOW (ref 98–110)
CO2 SERPL-SCNC: 25 MMOL/L — SIGNIFICANT CHANGE UP (ref 17–32)
CO2 SERPL-SCNC: 31 MMOL/L — SIGNIFICANT CHANGE UP (ref 17–32)
CREAT SERPL-MCNC: 4.2 MG/DL — CRITICAL HIGH (ref 0.7–1.5)
CREAT SERPL-MCNC: 5.7 MG/DL — CRITICAL HIGH (ref 0.7–1.5)
EGFR: 10 ML/MIN/1.73M2 — LOW
EGFR: 15 ML/MIN/1.73M2 — LOW
GLUCOSE BLDC GLUCOMTR-MCNC: 107 MG/DL — HIGH (ref 70–99)
GLUCOSE BLDC GLUCOMTR-MCNC: 220 MG/DL — HIGH (ref 70–99)
GLUCOSE BLDC GLUCOMTR-MCNC: 270 MG/DL — HIGH (ref 70–99)
GLUCOSE SERPL-MCNC: 226 MG/DL — HIGH (ref 70–99)
GLUCOSE SERPL-MCNC: 91 MG/DL — SIGNIFICANT CHANGE UP (ref 70–99)
HCT VFR BLD CALC: 22.7 % — LOW (ref 42–52)
HCT VFR BLD CALC: 25.3 % — LOW (ref 42–52)
HGB BLD-MCNC: 7.4 G/DL — LOW (ref 14–18)
HGB BLD-MCNC: 8.1 G/DL — LOW (ref 14–18)
INR BLD: 1.13 RATIO — SIGNIFICANT CHANGE UP (ref 0.65–1.3)
MAGNESIUM SERPL-MCNC: 1.9 MG/DL — SIGNIFICANT CHANGE UP (ref 1.8–2.4)
MAGNESIUM SERPL-MCNC: 2 MG/DL — SIGNIFICANT CHANGE UP (ref 1.8–2.4)
MCHC RBC-ENTMCNC: 31.5 PG — HIGH (ref 27–31)
MCHC RBC-ENTMCNC: 32 G/DL — SIGNIFICANT CHANGE UP (ref 32–37)
MCHC RBC-ENTMCNC: 32 PG — HIGH (ref 27–31)
MCHC RBC-ENTMCNC: 32.6 G/DL — SIGNIFICANT CHANGE UP (ref 32–37)
MCV RBC AUTO: 98.3 FL — HIGH (ref 80–94)
MCV RBC AUTO: 98.4 FL — HIGH (ref 80–94)
NRBC # BLD: 0 /100 WBCS — SIGNIFICANT CHANGE UP (ref 0–0)
NRBC # BLD: 0 /100 WBCS — SIGNIFICANT CHANGE UP (ref 0–0)
PLATELET # BLD AUTO: 389 K/UL — SIGNIFICANT CHANGE UP (ref 130–400)
PLATELET # BLD AUTO: 406 K/UL — HIGH (ref 130–400)
POTASSIUM SERPL-MCNC: 4.9 MMOL/L — SIGNIFICANT CHANGE UP (ref 3.5–5)
POTASSIUM SERPL-MCNC: 5 MMOL/L — SIGNIFICANT CHANGE UP (ref 3.5–5)
POTASSIUM SERPL-SCNC: 4.9 MMOL/L — SIGNIFICANT CHANGE UP (ref 3.5–5)
POTASSIUM SERPL-SCNC: 5 MMOL/L — SIGNIFICANT CHANGE UP (ref 3.5–5)
PROT SERPL-MCNC: 6.2 G/DL — SIGNIFICANT CHANGE UP (ref 6–8)
PROT SERPL-MCNC: 6.7 G/DL — SIGNIFICANT CHANGE UP (ref 6–8)
PROTHROM AB SERPL-ACNC: 12.9 SEC — HIGH (ref 9.95–12.87)
RBC # BLD: 2.31 M/UL — LOW (ref 4.7–6.1)
RBC # BLD: 2.57 M/UL — LOW (ref 4.7–6.1)
RBC # FLD: 15.7 % — HIGH (ref 11.5–14.5)
RBC # FLD: 15.8 % — HIGH (ref 11.5–14.5)
SODIUM SERPL-SCNC: 133 MMOL/L — LOW (ref 135–146)
SODIUM SERPL-SCNC: 137 MMOL/L — SIGNIFICANT CHANGE UP (ref 135–146)
WBC # BLD: 11.48 K/UL — HIGH (ref 4.8–10.8)
WBC # BLD: 11.55 K/UL — HIGH (ref 4.8–10.8)
WBC # FLD AUTO: 11.48 K/UL — HIGH (ref 4.8–10.8)
WBC # FLD AUTO: 11.55 K/UL — HIGH (ref 4.8–10.8)

## 2022-10-19 PROCEDURE — 99221 1ST HOSP IP/OBS SF/LOW 40: CPT

## 2022-10-19 PROCEDURE — 99232 SBSQ HOSP IP/OBS MODERATE 35: CPT

## 2022-10-19 RX ORDER — DARBEPOETIN ALFA IN POLYSORBAT 200MCG/0.4
25 PEN INJECTOR (ML) SUBCUTANEOUS
Refills: 0 | Status: DISCONTINUED | OUTPATIENT
Start: 2022-10-19 | End: 2022-10-21

## 2022-10-19 RX ORDER — ASPIRIN/CALCIUM CARB/MAGNESIUM 324 MG
81 TABLET ORAL DAILY
Refills: 0 | Status: DISCONTINUED | OUTPATIENT
Start: 2022-10-19 | End: 2022-10-21

## 2022-10-19 RX ADMIN — Medication 4: at 17:02

## 2022-10-19 RX ADMIN — INSULIN GLARGINE 15 UNIT(S): 100 INJECTION, SOLUTION SUBCUTANEOUS at 21:20

## 2022-10-19 RX ADMIN — ONDANSETRON 4 MILLIGRAM(S): 8 TABLET, FILM COATED ORAL at 04:04

## 2022-10-19 RX ADMIN — SEVELAMER CARBONATE 800 MILLIGRAM(S): 2400 POWDER, FOR SUSPENSION ORAL at 13:59

## 2022-10-19 RX ADMIN — Medication 50 MILLIGRAM(S): at 05:24

## 2022-10-19 RX ADMIN — Medication 25 MICROGRAM(S): at 10:09

## 2022-10-19 RX ADMIN — Medication 50 MILLIGRAM(S): at 17:09

## 2022-10-19 RX ADMIN — SEVELAMER CARBONATE 800 MILLIGRAM(S): 2400 POWDER, FOR SUSPENSION ORAL at 21:20

## 2022-10-19 RX ADMIN — Medication 81 MILLIGRAM(S): at 17:08

## 2022-10-19 RX ADMIN — HEPARIN SODIUM 5000 UNIT(S): 5000 INJECTION INTRAVENOUS; SUBCUTANEOUS at 05:26

## 2022-10-19 RX ADMIN — ATORVASTATIN CALCIUM 40 MILLIGRAM(S): 80 TABLET, FILM COATED ORAL at 21:20

## 2022-10-19 RX ADMIN — MEROPENEM 100 MILLIGRAM(S): 1 INJECTION INTRAVENOUS at 05:26

## 2022-10-19 RX ADMIN — SEVELAMER CARBONATE 800 MILLIGRAM(S): 2400 POWDER, FOR SUSPENSION ORAL at 05:24

## 2022-10-19 NOTE — CONSULT NOTE ADULT - ASSESSMENT
History   MI  CAD CABG PCI  PVD RT CEA   ESRD ON HD   adm with sepsis        pre op  dedridement   at  moderate    cardiac risk

## 2022-10-19 NOTE — PROGRESS NOTE ADULT - SUBJECTIVE AND OBJECTIVE BOX
LAWRENCE SCHWABACHER 62y Male  MRN#: [id number]   Hospital Day: 5d    SUBJECTIVE  Patient is a 62y old Male who presents with a chief complaint of Sepsis (19 Oct 2022 09:08)  Currently admitted to medicine with the primary diagnosis of   INTERVAL HPI AND OVERNIGHT EVENTS:  Patient was examined and seen at bedside. This morning he is resting comfortably in bed. Reports one episode of vomiting last night. Patient reports that yesterday after being NPO most of the day, he ate large filling dinner in the PM, and then overnight had to vomit. Had some nausea, s/p Zofran. FS stable 100's overnight. Still pending angio w/revascularization per vascular, procedure to happen today or tomorrow. Minimal pain, no sob or dizziness, afebrile. Otherwise, vitals stable, no other acute events.     REVIEW OF SYMPTOMS:  Relevant ros per above    OBJECTIVE  PAST MEDICAL & SURGICAL HISTORY  CAD (coronary artery disease)  1 stent 2008    S/P CABG (coronary artery bypass graft)  x4    Diabetes mellitus    Transient ischemic attack (TIA)  2017; 2008    Chronic kidney disease (CKD)  Stage IV    Hypertension    Stented coronary artery  in 2008    Myocardial infarction  2012    PAD (peripheral artery disease)  S/p bypass left leg    HLD (hyperlipidemia)    BPH (benign prostatic hyperplasia)    Pain in left knee  s/p fall    OA (osteoarthritis)    [place] (hard of hearing)    Chronic anemia    S/P CABG (coronary artery bypass graft)  2012    H/O arterial bypass of lower limb  Left Lower Extremity (2016)    History of surgery  Left CEA (2017)  Left Pinkie toe Amputation (2014)  CABG x 4 (2012)  Card cath - stent (2008)      AV fistula  2019  LEFT AV FISTULA      ALLERGIES:  No Known Allergies    MEDICATIONS:  STANDING MEDICATIONS  atorvastatin 40 milliGRAM(s) Oral at bedtime  darbepoetin Injectable Syringe 25 MICROGram(s) IV Push <User Schedule>  dextrose 10% Bolus 125 milliLiter(s) IV Bolus once  dextrose 5%. 1000 milliLiter(s) IV Continuous <Continuous>  dextrose 5%. 1000 milliLiter(s) IV Continuous <Continuous>  dextrose 5%. 1000 milliLiter(s) IV Continuous <Continuous>  dextrose 50% Injectable 25 Gram(s) IV Push once  dextrose 50% Injectable 12.5 Gram(s) IV Push once  dextrose 50% Injectable 25 Gram(s) IV Push once  glucagon  Injectable 1 milliGRAM(s) IntraMuscular once  heparin   Injectable 5000 Unit(s) SubCutaneous every 12 hours  influenza   Vaccine 0.5 milliLiter(s) IntraMuscular once  insulin glargine Injectable (LANTUS) 15 Unit(s) SubCutaneous at bedtime  insulin lispro (ADMELOG) corrective regimen sliding scale   SubCutaneous three times a day before meals  meropenem  IVPB 500 milliGRAM(s) IV Intermittent every 24 hours  metoprolol tartrate 50 milliGRAM(s) Oral two times a day  sevelamer carbonate 800 milliGRAM(s) Oral three times a day    PRN MEDICATIONS  acetaminophen     Tablet .. 650 milliGRAM(s) Oral every 6 hours PRN  aluminum hydroxide/magnesium hydroxide/simethicone Suspension 30 milliLiter(s) Oral every 4 hours PRN  dextrose Oral Gel 15 Gram(s) Oral once PRN  melatonin 3 milliGRAM(s) Oral at bedtime PRN  ondansetron Injectable 4 milliGRAM(s) IV Push every 8 hours PRN      VITAL SIGNS: Last 24 Hours  T(C): 36.3 (18 Oct 2022 21:14), Max: 36.3 (18 Oct 2022 21:14)  T(F): 97.3 (18 Oct 2022 21:14), Max: 97.3 (18 Oct 2022 21:14)  HR: 69 (19 Oct 2022 08:55) (69 - 79)  BP: 141/57 (19 Oct 2022 08:55) (137/65 - 159/72)  BP(mean): --  RR: 18 (19 Oct 2022 08:55) (18 - 18)  SpO2: 99% (18 Oct 2022 21:14) (99% - 99%)    LABS:                        7.4    11.48 )-----------( 389      ( 19 Oct 2022 09:20 )             22.7     10-19    133<L>  |  97<L>  |  53<H>  ----------------------------<  91  4.9   |  25  |  5.7<HH>    Ca    8.0<L>      19 Oct 2022 09:20  Phos  3.0     10-18  Mg     2.0     10-19    TPro  6.2  /  Alb  2.3<L>  /  TBili  0.2  /  DBili  x   /  AST  12  /  ALT  6   /  AlkPhos  143<H>  10-19    PT/INR - ( 18 Oct 2022 12:20 )   PT: 12.10 sec;   INR: 1.06 ratio         PTT - ( 18 Oct 2022 12:20 )  PTT:31.0 sec          Culture - Abscess with Gram Stain (collected 16 Oct 2022 17:50)  Source: .Abscess left foot wound  Preliminary Report (18 Oct 2022 19:12):    Rare Morganella morganii    Rare Enterobacter cloacae complex    Rare Proteus vulgaris group    Rare Enterococcus faecium (vancomycin resistant)  Organism: Morganella morganii  Enterobacter cloacae complex  Proteus vulgaris group  Enterococcus faecium (vancomycin resistant) (18 Oct 2022 19:11)  Organism: Enterococcus faecium (vancomycin resistant) (18 Oct 2022 19:11)  Organism: Proteus vulgaris group (18 Oct 2022 19:05)  Organism: Enterobacter cloacae complex (18 Oct 2022 19:05)  Organism: Morganella morganii (18 Oct 2022 19:05)      RADIOLOGY:      PHYSICAL EXAM:  CONSTITUTIONAL: No acute distress, well-developed, well-groomed, AAOx3  HEAD: Atraumatic, normocephalic  EYES: EOM intact, conjunctiva and sclera clear, non-injected, anicteric  ENT: Supple, no masses, no thyromegaly, no bruits, no JVD; moist mucous membranes  PULMONARY: Clear to auscultation bilaterally; no wheezes, rales, or rhonchi  CARDIOVASCULAR: Regular rate and rhythm; no murmurs, rubs, or gallops  GASTROINTESTINAL: Soft, non-tender, + increased resonance to percussion, obese habitus; bowel sounds present  MUSCULOSKELETAL: 2+ peripheral pulses; no clubbing, no cyanosis, no edema, surgical wrap in place over L foot, non-tender to palpation  NEUROLOGY: non-focal, moving extremities, normal strength, no facial droop  SKIN: No rashes or lesions; warm and dry

## 2022-10-19 NOTE — CONSULT NOTE ADULT - SUBJECTIVE AND OBJECTIVE BOX
Patient is a 62y old  Male who presents with a chief complaint of Sepsis (19 Oct 2022 12:38)      HPI:     61 y/o with a PMHx of DM, HTN, ESRD (on dialysis M/W/F, last session on 10/14), CAD s/p 1 stent on 2008 and quadruple CABG on 2012, PAD s/p bilateral angioplasty at [facility], was transferred from [facility] complaining of weakness and unhealed wound in the left foot.    Two months ago the patient inadvertently stepped on glass while cleaning his house injuring his left foot. His podiatrist removed glass fragments and sent him home on a week long course of antibiotics (patient unsure about which ABx). According to the patient the wound failed to improve, he progressively developed weakness on his legs, and recurring vomiting, which prompted him to go to [facility]'s ED this last Tuesday 10/11. At [facility] patient was found to be septic and was started on IV Vancomycin 1000mg and Meropenem 500mg. Blood cultures grew ESBL E.Coli. An MRI of the foot showed "osteomyelitis of the calcaneus bone, with what appears to be a subacute fracture". Patient was evaluated by vascular surgery and podiatry at [facility], who recommended debriding the L foot wound. Patient requested to be transferred to [facility] where his vascular surgeons are. Patient's DC paperwork available in his chart.    On arrival:  /59 ;  ; T 100.1F ; O2 98% on 2L NC. Patient reported feeling more lethargic than usual, but denied LE pain, SOB, chest pain, or any other subjective complaints.  (15 Oct 2022 01:23)      PAST MEDICAL & SURGICAL HISTORY:  CAD (coronary artery disease)  1 stent 2008      S/P CABG (coronary artery bypass graft)  x4      Diabetes mellitus      Transient ischemic attack (TIA)  2017; 2008      Chronic kidney disease (CKD)  Stage IV      Hypertension      Stented coronary artery  in 2008      Myocardial infarction  2012      PAD (peripheral artery disease)  S/p bypass left leg      HLD (hyperlipidemia)      BPH (benign prostatic hyperplasia)      Pain in left knee  s/p fall      OA (osteoarthritis)      [place] (hard of hearing)      Chronic anemia      S/P CABG (coronary artery bypass graft)  2012      H/O arterial bypass of lower limb  Left Lower Extremity (2016)      History of surgery  Left CEA (2017)  Left Pinkie toe Amputation (2014)  CABG x 4 (2012)  Card cath - stent (2008)        AV fistula  2019  LEFT AV FISTULA          PREVIOUS DIAGNOSTIC TESTING:      ECHO  FINDINGS:    STRESS  FINDINGS:    CATHETERIZATION  FINDINGS:    MEDICATIONS  (STANDING):  atorvastatin 40 milliGRAM(s) Oral at bedtime  darbepoetin Injectable Syringe 25 MICROGram(s) IV Push <User Schedule>  dextrose 10% Bolus 125 milliLiter(s) IV Bolus once  dextrose 5%. 1000 milliLiter(s) (50 mL/Hr) IV Continuous <Continuous>  dextrose 5%. 1000 milliLiter(s) (50 mL/Hr) IV Continuous <Continuous>  dextrose 5%. 1000 milliLiter(s) (100 mL/Hr) IV Continuous <Continuous>  dextrose 50% Injectable 25 Gram(s) IV Push once  dextrose 50% Injectable 12.5 Gram(s) IV Push once  dextrose 50% Injectable 25 Gram(s) IV Push once  glucagon  Injectable 1 milliGRAM(s) IntraMuscular once  heparin   Injectable 5000 Unit(s) SubCutaneous every 12 hours  influenza   Vaccine 0.5 milliLiter(s) IntraMuscular once  insulin glargine Injectable (LANTUS) 15 Unit(s) SubCutaneous at bedtime  insulin lispro (ADMELOG) corrective regimen sliding scale   SubCutaneous three times a day before meals  meropenem  IVPB 500 milliGRAM(s) IV Intermittent every 24 hours  metoprolol tartrate 50 milliGRAM(s) Oral two times a day  sevelamer carbonate 800 milliGRAM(s) Oral three times a day    MEDICATIONS  (PRN):  acetaminophen     Tablet .. 650 milliGRAM(s) Oral every 6 hours PRN Temp greater or equal to 38C (100.4F), Mild Pain (1 - 3)  aluminum hydroxide/magnesium hydroxide/simethicone Suspension 30 milliLiter(s) Oral every 4 hours PRN Dyspepsia  dextrose Oral Gel 15 Gram(s) Oral once PRN Blood Glucose LESS THAN 70 milliGRAM(s)/deciliter  melatonin 3 milliGRAM(s) Oral at bedtime PRN Insomnia  ondansetron Injectable 4 milliGRAM(s) IV Push every 8 hours PRN Nausea and/or Vomiting      FAMILY HISTORY:  Family history of heart disease (Father)    DM (diabetes mellitus) (Mother)    ESRD (end stage renal disease) on dialysis (Mother)        SOCIAL HISTORY:  CIGARETTES:    ALCOHOL:    REVIEW OF SYSTEMS:  CONSTITUTIONAL: No fever, weight loss, or fatigue  NECK: No pain or stiffness  RESPIRATORY: No cough, wheezing, chills or hemoptysis; No shortness of breath  CARDIOVASCULAR: No chest pain, palpitations, dizziness, or leg swelling  GASTROINTESTINAL: No abdominal or epigastric pain. No nausea, vomiting, or hematemesis; No diarrhea or constipation. No melena or hematochezia.  GENITOURINARY: No dysuria, frequency, hematuria, or incontinence  NEUROLOGICAL: No headaches, memory loss, loss of strength, numbness, or tremors  SKIN: No itching, burning, rashes, or lesions   ENDOCRINE: No heat or cold intolerance; No hair loss  MUSCULOSKELETAL: No joint pain or swelling; No muscle, back, or extremity pain  HEME/LYMPH: No easy bruising, or bleeding gums          Vital Signs Last 24 Hrs  T(C): 35.8 (19 Oct 2022 12:58), Max: 36.3 (18 Oct 2022 21:14)  T(F): 96.5 (19 Oct 2022 12:58), Max: 97.3 (18 Oct 2022 21:14)  HR: 78 (19 Oct 2022 12:58) (69 - 79)  BP: 134/60 (19 Oct 2022 12:58) (134/60 - 167/86)  BP(mean): --  RR: 18 (19 Oct 2022 12:58) (18 - 18)  SpO2: 99% (19 Oct 2022 12:58) (99% - 99%)    Parameters below as of 19 Oct 2022 12:58  Patient On (Oxygen Delivery Method): room air            PHYSICAL EXAM:  GENERAL: NAD, well-groomed, well-developed  HEAD:  Atraumatic, Normocephalic  NECK: Supple, No JVD, Normal thyroid  NERVOUS SYSTEM:  Alert & Oriented X3, Good concentration  CHEST/LUNG: Clear to percussion bilaterally; No rales, rhonchi, wheezing, or rubs  HEART: Regular rate and rhythm; No murmurs, rubs, or gallops  ABDOMEN: Soft, Nontender, Nondistended; Bowel sounds present  EXTREMITIES:  2+ Peripheral Pulses, No clubbing, cyanosis, or edema  SKIN: No rashes or lesions    INTERPRETATION OF TELEMETRY:    ECG:    I&O's Detail    18 Oct 2022 07:01  -  19 Oct 2022 07:00  --------------------------------------------------------  IN:    dextrose 5%: 500 mL  Total IN: 500 mL    OUT:  Total OUT: 0 mL    Total NET: 500 mL      19 Oct 2022 07:01  -  19 Oct 2022 12:59  --------------------------------------------------------  IN:  Total IN: 0 mL    OUT:    Other (mL): 3000 mL  Total OUT: 3000 mL    Total NET: -3000 mL          LABS:                        7.4    11.48 )-----------( 389      ( 19 Oct 2022 09:20 )             22.7     10-19    133<L>  |  97<L>  |  53<H>  ----------------------------<  91  4.9   |  25  |  5.7<HH>    Ca    8.0<L>      19 Oct 2022 09:20  Phos  3.0     10-18  Mg     2.0     10-19    TPro  6.2  /  Alb  2.3<L>  /  TBili  0.2  /  DBili  x   /  AST  12  /  ALT  6   /  AlkPhos  143<H>  10-19        PT/INR - ( 18 Oct 2022 12:20 )   PT: 12.10 sec;   INR: 1.06 ratio         PTT - ( 18 Oct 2022 12:20 )  PTT:31.0 sec    I&O's Summary    18 Oct 2022 07:01  -  19 Oct 2022 07:00  --------------------------------------------------------  IN: 500 mL / OUT: 0 mL / NET: 500 mL    19 Oct 2022 07:01  -  19 Oct 2022 12:59  --------------------------------------------------------  IN: 0 mL / OUT: 3000 mL / NET: -3000 mL        RADIOLOGY & ADDITIONAL STUDIES:

## 2022-10-19 NOTE — PROGRESS NOTE ADULT - ASSESSMENT
61 y/o with a PMHx of DM, HTN, ESRD (on dialysis M/W/F, last session on 10/14), CAD s/p 1 stent on 2008 and quadruple CABG on 2012, PAD s/p bilateral angioplasty at [facility], was transferred from [facility] complaining of weakness and unhealed wound in the left foot.  Vascular surgery consulted for L nonhealing foot ulcer with history of PAD.  Patient has intact, dopplerable signals in the LLE. In 9/15, patient had no evidence of arterial stenosis in the LLE.     PLAN:   - Rescheduled for LLE angiogram on 10/20  - NPO @ [facility]   - Pre-op labs including CBC, BMP, mag, phos, coags, T&S  - COVID swab within 5 days of OR     SPECTRA [id number]

## 2022-10-19 NOTE — CONSULT NOTE ADULT - CONSULT REQUESTED DATE/TIME
15-Oct-2022 10:21
17-Oct-2022 16:53
15-Oct-2022 12:31
15-Oct-2022 10:39
19-Oct-2022
19-Oct-2022 12:38

## 2022-10-19 NOTE — PROGRESS NOTE ADULT - SUBJECTIVE AND OBJECTIVE BOX
VASCULAR SURGERY PROGRESS NOTE    CC: non-healing L heel ulcer  Hospital Day #6    Events of past 24 hours: Patient examined at bedside, resting comfortably. Rescheduled for OR on 10/20 for LLE angiogram. Pre-op today 10/19.       ROS otherwise negative except per subjective and HPI      PAST MEDICAL & SURGICAL HISTORY:  CAD (coronary artery disease)  1 stent 2008  S/P CABG (coronary artery bypass graft)  x4  Diabetes mellitus  Transient ischemic attack (TIA)  2017; 2008  Chronic kidney disease (CKD)  Stage IV  Hypertension  Stented coronary artery  in 2008  Myocardial infarction  2012  PAD (peripheral artery disease)  S/p bypass left leg  HLD (hyperlipidemia)  BPH (benign prostatic hyperplasia)  Pain in left knee  s/p fall  OA (osteoarthritis)  [place] (hard of hearing)  Chronic anemia  S/P CABG (coronary artery bypass graft)  2012  H/O arterial bypass of lower limb  Left Lower Extremity (2016)  History of surgery  Left CEA (2017)  Left Pinkie toe Amputation (2014)  CABG x 4 (2012)  Card cath - stent (2008)  AV fistula  2019  LEFT AV FISTULA      Vital Signs Last 24 Hrs  T(C): 36.3 (18 Oct 2022 21:14), Max: 36.5 (18 Oct 2022 05:22)  T(F): 97.3 (18 Oct 2022 21:14), Max: 97.7 (18 Oct 2022 05:22)  HR: 72 (18 Oct 2022 21:14) (72 - 79)  BP: 137/65 (18 Oct 2022 21:14) (137/65 - 159/72)  BP(mean): --  RR: 18 (18 Oct 2022 21:14) (18 - 18)  SpO2: 99% (18 Oct 2022 21:14) (99% - 99%)      Diet:    I&O's Detail    17 Oct 2022 07:01  -  18 Oct 2022 07:00  --------------------------------------------------------  IN:  Total IN: 0 mL    OUT:    Other (mL): 3000 mL  Total OUT: 3000 mL    Total NET: -3000 mL      18 Oct 2022 07:01  -  19 Oct 2022 02:20  --------------------------------------------------------  IN:    dextrose 5%: 500 mL  Total IN: 500 mL    OUT:  Total OUT: 0 mL    Total NET: 500 mL      PHYSICAL EXAM  Appearance: Normal	  Cardiovascular: RRR Normal S1 S2,   Respiratory: chest rising equally b/l, no labored breathing	  Gastrointestinal:  Soft, Non-tender, non-distended	  Skin: No rashes, No ecchymoses, No cyanosis  Extremities: L nonhealing heel wound with gangrene    PULSES:  Dorsal Pedal: Right palpable, left dopplerable  Posterior Tibial: dopplerable b/l    MEDICATIONS:   MEDICATIONS  (STANDING):  atorvastatin 40 milliGRAM(s) Oral at bedtime  dextrose 10% Bolus 125 milliLiter(s) IV Bolus once  dextrose 5%. 1000 milliLiter(s) (100 mL/Hr) IV Continuous <Continuous>  dextrose 5%. 1000 milliLiter(s) (50 mL/Hr) IV Continuous <Continuous>  dextrose 5%. 1000 milliLiter(s) (50 mL/Hr) IV Continuous <Continuous>  dextrose 50% Injectable 25 Gram(s) IV Push once  dextrose 50% Injectable 12.5 Gram(s) IV Push once  dextrose 50% Injectable 25 Gram(s) IV Push once  glucagon  Injectable 1 milliGRAM(s) IntraMuscular once  heparin   Injectable 5000 Unit(s) SubCutaneous every 12 hours  influenza   Vaccine 0.5 milliLiter(s) IntraMuscular once  insulin glargine Injectable (LANTUS) 15 Unit(s) SubCutaneous at bedtime  insulin lispro (ADMELOG) corrective regimen sliding scale   SubCutaneous three times a day before meals  meropenem  IVPB 500 milliGRAM(s) IV Intermittent every 24 hours  metoprolol tartrate 50 milliGRAM(s) Oral two times a day  sevelamer carbonate 800 milliGRAM(s) Oral three times a day    MEDICATIONS  (PRN):  acetaminophen     Tablet .. 650 milliGRAM(s) Oral every 6 hours PRN Temp greater or equal to 38C (100.4F), Mild Pain (1 - 3)  aluminum hydroxide/magnesium hydroxide/simethicone Suspension 30 milliLiter(s) Oral every 4 hours PRN Dyspepsia  dextrose Oral Gel 15 Gram(s) Oral once PRN Blood Glucose LESS THAN 70 milliGRAM(s)/deciliter  melatonin 3 milliGRAM(s) Oral at bedtime PRN Insomnia  ondansetron Injectable 4 milliGRAM(s) IV Push every 8 hours PRN Nausea and/or Vomiting      LAB/STUDIES:                        8.1    9.89  )-----------( 409      ( 18 Oct 2022 06:24 )             25.0     10-18    135  |  97<L>  |  43<H>  ----------------------------<  58<L>  4.6   |  25  |  4.9<HH>    Ca    7.9<L>      18 Oct 2022 06:24  Phos  3.0     10-18  Mg     1.9     10-18    TPro  6.3  /  Alb  2.3<L>  /  TBili  0.3  /  DBili  x   /  AST  16  /  ALT  9   /  AlkPhos  144<H>  10-18    PT/INR - ( 18 Oct 2022 12:20 )   PT: 12.10 sec;   INR: 1.06 ratio         PTT - ( 18 Oct 2022 12:20 )  PTT:31.0 sec  LIVER FUNCTIONS - ( 18 Oct 2022 06:24 )  Alb: 2.3 g/dL / Pro: 6.3 g/dL / ALK PHOS: 144 U/L / ALT: 9 U/L / AST: 16 U/L / GGT: x             Culture - Abscess with Gram Stain (collected 16 Oct 2022 17:50)  Source: .Abscess left foot wound  Preliminary Report (18 Oct 2022 19:12):    Rare Morganella morganii    Rare Enterobacter cloacae complex    Rare Proteus vulgaris group    Rare Enterococcus faecium (vancomycin resistant)  Organism: Morganella morganii  Enterobacter cloacae complex  Proteus vulgaris group  Enterococcus faecium (vancomycin resistant) (18 Oct 2022 19:11)  Organism: Enterococcus faecium (vancomycin resistant) (18 Oct 2022 19:11)  Organism: Proteus vulgaris group (18 Oct 2022 19:05)  Organism: Enterobacter cloacae complex (18 Oct 2022 19:05)  Organism: Morganella morganii (18 Oct 2022 19:05)

## 2022-10-19 NOTE — PROGRESS NOTE ADULT - SUBJECTIVE AND OBJECTIVE BOX
Podiatry Progress Note    Subjective:  SCHWABACHER, LAWRENCE is a  62y Male.   Seen bedside.   Patient is a 62y old  Male who presents with a chief complaint of Sepsis (19 Oct 2022 12:58)      Surgery scheduled for Friday 10/21/22 @ [facility] ; Excisional Debridement of Soft Tissue & Bone, Left Foot;       Past Medical History and Surgical History  PAST MEDICAL & SURGICAL HISTORY:  CAD (coronary artery disease)  1 stent 2008      S/P CABG (coronary artery bypass graft)  x4      Diabetes mellitus      Transient ischemic attack (TIA)  2017; 2008      Chronic kidney disease (CKD)  Stage IV      Hypertension      Stented coronary artery  in 2008      Myocardial infarction  2012      PAD (peripheral artery disease)  S/p bypass left leg      HLD (hyperlipidemia)      BPH (benign prostatic hyperplasia)      Pain in left knee  s/p fall      OA (osteoarthritis)      [place] (hard of hearing)      Chronic anemia      S/P CABG (coronary artery bypass graft)  2012      H/O arterial bypass of lower limb  Left Lower Extremity (2016)      History of surgery  Left CEA (2017)  Left Pinkie toe Amputation (2014)  CABG x 4 (2012)  Card cath - stent (2008)        AV fistula  2019  LEFT AV FISTULA           Objective:  Vital Signs Last 24 Hrs  T(C): 35.8 (19 Oct 2022 12:58), Max: 36.3 (18 Oct 2022 21:14)  T(F): 96.5 (19 Oct 2022 12:58), Max: 97.3 (18 Oct 2022 21:14)  HR: 78 (19 Oct 2022 12:58) (69 - 79)  BP: 134/60 (19 Oct 2022 12:58) (134/60 - 167/86)  BP(mean): --  RR: 18 (19 Oct 2022 12:58) (18 - 18)  SpO2: 99% (19 Oct 2022 12:58) (99% - 99%)    Parameters below as of 19 Oct 2022 12:58  Patient On (Oxygen Delivery Method): room air                            7.4    11.48 )-----------( 389      ( 19 Oct 2022 09:20 )             22.7                 10-19    133<L>  |  97<L>  |  53<H>  ----------------------------<  91  4.9   |  25  |  5.7<HH>    Ca    8.0<L>      19 Oct 2022 09:20  Phos  3.0     10-18  Mg     2.0     10-19    TPro  6.2  /  Alb  2.3<L>  /  TBili  0.2  /  DBili  x   /  AST  12  /  ALT  6   /  AlkPhos  143<H>  10-19      Physical Exam - Lower Extremity Focused:   #Left Lower Extremity  Derm:   Open Left Plantar Ulcer @ Plantar Medial Rearfoot;    -Wound Base Fibrogranular; 50% Fibrous, 50% Granular; unchanged;    -Wound Probes to Bone w/ Mild Serosanguinous Drainage;    -Wound Margins Irregular;  Skin to Plantar Heel Dry, Dark, Hard; Boggy to w/Palpation  Skin LLE Dry, Scaly;   Mild Cellulitis w/ Erythema of Left Lower Extremity;    Vascular: DP and PT Pulses Diminished; Foot is Tepid to Warm to the touch; Capillary Refill Delayed to the Toes;  Neuro: Protective Sensation Diminished / Moderately Neuropathic   MSK: Mild Charcot Deformity Left Foot; No Pain On Palpation at Wound Site     Assessment:  Left Plantar Rearfoot Ulcer - Probes to Bone  Dry Gangrene Left Plantar Heel  Cellulitic Left Lower Extremity     Plan:  Chart reviewed and Patient evaluated. All Questions and Concerns Addressed and Answered  Discussed diagnosis and treatment with patient  Wound Flushed w/ NS; Wound Packed w/ Iodoform; Dressed w/Betadine-soaked Gauze / DSD / Kerlix   Local Wound Care; As Stated Above   Deep Left Foot Wound Culture Obtained; Sent to Pathology Lab; Will f/u;  Surgery scheduled for Friday 10/21/22 @ [facility] ; Excisional Debridement of Soft Tissue & Bone, Left Foot;     -Patient on dual-anticoagulation (ASA, Plavix); needs 5d off Plavix prior to sx;  Will update final OR time later this week;   Request Medical Clearance;  Please optimize lab values prior to OR  Please make pt NPO @ [facility] 10/20/22  Discussed Plan w/ Attending Dr. Pacheco     Podiatry

## 2022-10-19 NOTE — PROGRESS NOTE ADULT - SUBJECTIVE AND OBJECTIVE BOX
Nephrology progress note  Patient is seen and examined, events over the last 24 h noted .  Lying in bed comfortable     Allergies:  No Known Allergies    Hospital Medications:   MEDICATIONS  (STANDING):  atorvastatin 40 milliGRAM(s) Oral at bedtime  glucagon  Injectable 1 milliGRAM(s) IntraMuscular once  heparin   Injectable 5000 Unit(s) SubCutaneous every 12 hours  influenza   Vaccine 0.5 milliLiter(s) IntraMuscular once  insulin glargine Injectable (LANTUS) 15 Unit(s) SubCutaneous at bedtime  insulin lispro (ADMELOG) corrective regimen sliding scale   SubCutaneous three times a day before meals  meropenem  IVPB 500 milliGRAM(s) IV Intermittent every 24 hours  metoprolol tartrate 50 milliGRAM(s) Oral two times a day  sevelamer carbonate 800 milliGRAM(s) Oral three times a day        VITALS:  T(F): 97.3 (10-18-22 @ 21:14), Max: 97.3 (10-18-22 @ 21:14)  HR: 69 (10-19-22 @ 08:55)  BP: 141/57 (10-19-22 @ 08:55)  RR: 18 (10-19-22 @ 08:55)  SpO2: 99% (10-18-22 @ 21:14)      10-17 @ 07:01  -  10-18 @ 07:00  --------------------------------------------------------  IN: 0 mL / OUT: 3000 mL / NET: -3000 mL    10-18 @ 07:01  -  10-19 @ 07:00  --------------------------------------------------------  IN: 500 mL / OUT: 0 mL / NET: 500 mL          PHYSICAL EXAM:  Constitutional: NAD  Neck: No JVD  Respiratory: CTAB,   Cardiovascular: S1, S2, RRR  Gastrointestinal: BS+, soft, NT/ND  Extremities: No cyanosis or clubbing. No peripheral edema  :  No schneider.   Skin: No rashes    LABS:  10-18    135  |  97<L>  |  43<H>  ----------------------------<  58<L>  4.6   |  25  |  4.9<HH>    Ca    7.9<L>      18 Oct 2022 06:24  Phos  3.0     10-18  Mg     1.9     10-18    TPro  6.3  /  Alb  2.3<L>  /  TBili  0.3  /  DBili      /  AST  16  /  ALT  9   /  AlkPhos  144<H>  10-18                          8.1    9.89  )-----------( 409      ( 18 Oct 2022 06:24 )             25.0       Urine Studies:        PTH -- (Ca 7.5)      [02-26-22 @ 16:00]   312  Vitamin D (25OH) 21      [02-26-22 @ 16:00]  HbA1c 5.8      [01-30-20 @ 06:46]  Lipid: chol 81, , HDL 22, LDL --      [10-15-22 @ 09:53]    HBsAb <3.0      [12-29-19 @ 17:44]  HBsAb Nonreact      [12-29-19 @ 17:44]  HBsAg Nonreact      [12-29-19 @ 17:44]  HBcAb Nonreact      [12-29-19 @ 17:44]  HCV 0.10, Nonreact      [12-29-19 @ 17:44]        RADIOLOGY & ADDITIONAL STUDIES:

## 2022-10-19 NOTE — PROGRESS NOTE ADULT - ASSESSMENT
63 y/o with a PMHx of DM, HTN, ESRD (on dialysis M/W/F, last session on 10/14), CAD s/p 1 stent on 2008 and quadruple CABG on 2012, PAD s/p bilateral angioplasty at [facility], was transferred from [facility] complaining of weakness and unhealed wound in the left foot. Patient was evaluated by vascular surgery and podiatry at [facility], who recommended debriding the L foot wound. Patient requested to be transferred to [facility] where his vascular surgeons are.  # HD today 3h opti 160 UF 3l   # seen by ID on meropenem   # podiatry  and vascular notes appreciated / angio 10/20  # BP well controlled   #on binders/PH noted decrease sevelamer to one tab po q12h with meals   # check iron stores / RAMSEY today may be resistant   # will follow

## 2022-10-19 NOTE — CONSULT NOTE ADULT - SUBJECTIVE AND OBJECTIVE BOX
INTERVENTIONAL RADIOLOGY CONSULT:     Procedure Requested: left lower extremity angiogram    HPI:     63 y/o with a PMHx of DM, HTN, ESRD (on dialysis M/W/F, last session on 10/14), CAD s/p 1 stent on 2008 and quadruple CABG on 2012, PAD s/p bilateral angioplasty at [facility], was transferred from [facility] complaining of weakness and unhealed wound in the left foot.    Two months ago the patient inadvertently stepped on glass while cleaning his house injuring his left foot. His podiatrist removed glass fragments and sent him home on a week long course of antibiotics (patient unsure about which ABx). According to the patient the wound failed to improve, he progressively developed weakness on his legs, and recurring vomiting, which prompted him to go to [facility]'s ED this last Tuesday 10/11. At [facility] patient was found to be septic and was started on IV Vancomycin 1000mg and Meropenem 500mg. Blood cultures grew ESBL E.Coli. An MRI of the foot showed "osteomyelitis of the calcaneus bone, with what appears to be a subacute fracture". Patient was evaluated by vascular surgery and podiatry at [facility], who recommended debriding the L foot wound. Patient requested to be transferred to [facility] where his vascular surgeons are. Patient's DC paperwork available in his chart.    On arrival:  /59 ;  ; T 100.1F ; O2 98% on 2L NC. Patient reported feeling more lethargic than usual, but denied LE pain, SOB, chest pain, or any other subjective complaints.  (15 Oct 2022 01:23)      PAST MEDICAL & SURGICAL HISTORY:  CAD (coronary artery disease)  1 stent 2008      S/P CABG (coronary artery bypass graft)  x4      Diabetes mellitus      Transient ischemic attack (TIA)  2017; 2008      Chronic kidney disease (CKD)  Stage IV      Hypertension      Stented coronary artery  in 2008      Myocardial infarction  2012      PAD (peripheral artery disease)  S/p bypass left leg      HLD (hyperlipidemia)      BPH (benign prostatic hyperplasia)      Pain in left knee  s/p fall      OA (osteoarthritis)      [place] (hard of hearing)      Chronic anemia      S/P CABG (coronary artery bypass graft)  2012      H/O arterial bypass of lower limb  Left Lower Extremity (2016)      History of surgery  Left CEA (2017)  Left Pinkie toe Amputation (2014)  CABG x 4 (2012)  Card cath - stent (2008)        AV fistula  2019  LEFT AV FISTULA          MEDICATIONS  (STANDING):  atorvastatin 40 milliGRAM(s) Oral at bedtime  darbepoetin Injectable Syringe 25 MICROGram(s) IV Push <User Schedule>  dextrose 10% Bolus 125 milliLiter(s) IV Bolus once  dextrose 5%. 1000 milliLiter(s) (50 mL/Hr) IV Continuous <Continuous>  dextrose 5%. 1000 milliLiter(s) (50 mL/Hr) IV Continuous <Continuous>  dextrose 5%. 1000 milliLiter(s) (100 mL/Hr) IV Continuous <Continuous>  dextrose 50% Injectable 25 Gram(s) IV Push once  dextrose 50% Injectable 12.5 Gram(s) IV Push once  dextrose 50% Injectable 25 Gram(s) IV Push once  glucagon  Injectable 1 milliGRAM(s) IntraMuscular once  heparin   Injectable 5000 Unit(s) SubCutaneous every 12 hours  influenza   Vaccine 0.5 milliLiter(s) IntraMuscular once  insulin glargine Injectable (LANTUS) 15 Unit(s) SubCutaneous at bedtime  insulin lispro (ADMELOG) corrective regimen sliding scale   SubCutaneous three times a day before meals  meropenem  IVPB 500 milliGRAM(s) IV Intermittent every 24 hours  metoprolol tartrate 50 milliGRAM(s) Oral two times a day  sevelamer carbonate 800 milliGRAM(s) Oral three times a day    MEDICATIONS  (PRN):  acetaminophen     Tablet .. 650 milliGRAM(s) Oral every 6 hours PRN Temp greater or equal to 38C (100.4F), Mild Pain (1 - 3)  aluminum hydroxide/magnesium hydroxide/simethicone Suspension 30 milliLiter(s) Oral every 4 hours PRN Dyspepsia  dextrose Oral Gel 15 Gram(s) Oral once PRN Blood Glucose LESS THAN 70 milliGRAM(s)/deciliter  melatonin 3 milliGRAM(s) Oral at bedtime PRN Insomnia  ondansetron Injectable 4 milliGRAM(s) IV Push every 8 hours PRN Nausea and/or Vomiting      Allergies    No Known Allergies    Intolerances      FAMILY HISTORY:  Family history of heart disease (Father)    DM (diabetes mellitus) (Mother)    ESRD (end stage renal disease) on dialysis (Mother)        Physical Exam:   Vital Signs Last 24 Hrs  T(C): 36.3 (18 Oct 2022 21:14), Max: 36.3 (18 Oct 2022 21:14)  T(F): 97.3 (18 Oct 2022 21:14), Max: 97.3 (18 Oct 2022 21:14)  HR: 78 (19 Oct 2022 11:50) (69 - 79)  BP: 167/86 (19 Oct 2022 11:50) (137/65 - 167/86)  BP(mean): --  RR: 18 (19 Oct 2022 11:50) (18 - 18)  SpO2: 99% (18 Oct 2022 21:14) (99% - 99%)    Parameters below as of 19 Oct 2022 11:50  Patient On (Oxygen Delivery Method): room air      Labs:                         7.4    11.48 )-----------( 389      ( 19 Oct 2022 09:20 )             22.7     10-19    133<L>  |  97<L>  |  53<H>  ----------------------------<  91  4.9   |  25  |  5.7<HH>    Ca    8.0<L>      19 Oct 2022 09:20  Phos  3.0     10-18  Mg     2.0     10-19    TPro  6.2  /  Alb  2.3<L>  /  TBili  0.2  /  DBili  x   /  AST  12  /  ALT  6   /  AlkPhos  143<H>  10-19    PT/INR - ( 18 Oct 2022 12:20 )   PT: 12.10 sec;   INR: 1.06 ratio         PTT - ( 18 Oct 2022 12:20 )  PTT:31.0 sec    Pertinent labs:                      7.4    11.48 )-----------( 389      ( 19 Oct 2022 09:20 )             22.7       10-19    133<L>  |  97<L>  |  53<H>  ----------------------------<  91  4.9   |  25  |  5.7<HH>    Ca    8.0<L>      19 Oct 2022 09:20  Phos  3.0     10-18  Mg     2.0     10-19    TPro  6.2  /  Alb  2.3<L>  /  TBili  0.2  /  DBili  x   /  AST  12  /  ALT  6   /  AlkPhos  143<H>  10-19      PT/INR - ( 18 Oct 2022 12:20 )   PT: 12.10 sec;   INR: 1.06 ratio         PTT - ( 18 Oct 2022 12:20 )  PTT:31.0 sec    Radiology & Additional Studies:     Radiology imaging reviewed.       ASSESSMENT/ PLAN:   63 y/o with a PMHx of DM, HTN, ESRD (on dialysis M/W/F, last session on 10/14), CAD s/p 1 stent on 2008 and quadruple CABG on 2012, PAD s/p bilateral angioplasty at [facility], was transferred from [facility] complaining of weakness and unhealed wound in the left foot. Vascular surgery consulted for L nonhealing foot ulcer with history of PAD.  Patient has intact, dopplerable signals in the LLE. In 9/15, patient had no evidence of arterial stenosis in the LLE. Vascular initially planned for LLE angiogram on 10/20, IR now consulted to perform procedure.  - plan for image guided LLE angiogram with possible intervention tomorrow 10/20 pending the following:  - hold ASA and Plavix (last dose ASA 10/17, Plavix 10/16)  - hold morning dose of heparin tomorrow  - keep NPO after midnight tonight  - repeat CBC, CMP, PT/INR - correct Na  - COVID negative as of 10/17 - ok for procedure     Thank you for the courtesy of this consult, please call [phone] with any further questions.

## 2022-10-19 NOTE — PROGRESS NOTE ADULT - SUBJECTIVE AND OBJECTIVE BOX
SCHWABACHER, LAWRENCE  62y  [facility] [id number] [address]      Patient is a 62y old  Male who presents with a chief complaint of Sepsis (19 Oct 2022 14:02)      INTERVAL HPI/OVERNIGHT EVENTS:  Patient feels well. he has no complains. no pain. no overnight events  he's agreeable with the plan.   he reported hx of stent and cabg in the past and was told not to hold aspirin.       REVIEW OF SYSTEMS:        FAMILY HISTORY:  Family history of heart disease (Father)    DM (diabetes mellitus) (Mother)    ESRD (end stage renal disease) on dialysis (Mother)      T(C): 35.8 (10-19-22 @ 12:58), Max: 36.3 (10-18-22 @ 21:14)  HR: 78 (10-19-22 @ 12:58) (69 - 79)  BP: 134/60 (10-19-22 @ 12:58) (134/60 - 167/86)  RR: 18 (10-19-22 @ 12:58) (18 - 18)  SpO2: 99% (10-19-22 @ 12:58) (99% - 99%)  Wt(kg): --Vital Signs Last 24 Hrs  T(C): 35.8 (19 Oct 2022 12:58), Max: 36.3 (18 Oct 2022 21:14)  T(F): 96.5 (19 Oct 2022 12:58), Max: 97.3 (18 Oct 2022 21:14)  HR: 78 (19 Oct 2022 12:58) (69 - 79)  BP: 134/60 (19 Oct 2022 12:58) (134/60 - 167/86)  BP(mean): --  RR: 18 (19 Oct 2022 12:58) (18 - 18)  SpO2: 99% (19 Oct 2022 12:58) (99% - 99%)    Parameters below as of 19 Oct 2022 12:58  Patient On (Oxygen Delivery Method): room air        PHYSICAL EXAM:  GENERAL: NAD, well-groomed, well-developed  PULM: Clear to auscultation bilaterally  CARDIAC: Regular rate and rhythm;  GI: Soft, Nontender, Nondistended; Bowel sounds present  EXTREMITIES:  left foot swollen and wrapped     Consultant(s) Notes Reviewed:  [x ] YES  [ ] NO  Care Discussed with Consultants/Other Providers [ x] YES  [ ] NO    LABS:                            7.4    11.48 )-----------( 389      ( 19 Oct 2022 09:20 )             22.7   10-19    133<L>  |  97<L>  |  53<H>  ----------------------------<  91  4.9   |  25  |  5.7<HH>    Ca    8.0<L>      19 Oct 2022 09:20  Phos  3.0     10-18  Mg     2.0     10-19    TPro  6.2  /  Alb  2.3<L>  /  TBili  0.2  /  DBili  x   /  AST  12  /  ALT  6   /  AlkPhos  143<H>  10-19            Culture - Abscess with Gram Stain (collected 16 Oct 2022 17:50)  Source: .Abscess left foot wound  Preliminary Report (18 Oct 2022 19:12):    Rare Morganella morganii    Rare Enterobacter cloacae complex    Rare Proteus vulgaris group    Rare Enterococcus faecium (vancomycin resistant)  Organism: Morganella morganii  Enterobacter cloacae complex  Proteus vulgaris group  Enterococcus faecium (vancomycin resistant) (18 Oct 2022 19:11)  Organism: Enterococcus faecium (vancomycin resistant) (18 Oct 2022 19:11)  Organism: Proteus vulgaris group (18 Oct 2022 19:05)  Organism: Enterobacter cloacae complex (18 Oct 2022 19:05)  Organism: Morganella morganii (18 Oct 2022 19:05)      acetaminophen     Tablet .. 650 milliGRAM(s) Oral every 6 hours PRN  aluminum hydroxide/magnesium hydroxide/simethicone Suspension 30 milliLiter(s) Oral every 4 hours PRN  aspirin enteric coated 81 milliGRAM(s) Oral daily  atorvastatin 40 milliGRAM(s) Oral at bedtime  darbepoetin Injectable Syringe 25 MICROGram(s) IV Push <User Schedule>  dextrose 10% Bolus 125 milliLiter(s) IV Bolus once  dextrose 5%. 1000 milliLiter(s) IV Continuous <Continuous>  dextrose 5%. 1000 milliLiter(s) IV Continuous <Continuous>  dextrose 5%. 1000 milliLiter(s) IV Continuous <Continuous>  dextrose 50% Injectable 25 Gram(s) IV Push once  dextrose 50% Injectable 12.5 Gram(s) IV Push once  dextrose 50% Injectable 25 Gram(s) IV Push once  dextrose Oral Gel 15 Gram(s) Oral once PRN  glucagon  Injectable 1 milliGRAM(s) IntraMuscular once  influenza   Vaccine 0.5 milliLiter(s) IntraMuscular once  insulin glargine Injectable (LANTUS) 15 Unit(s) SubCutaneous at bedtime  insulin lispro (ADMELOG) corrective regimen sliding scale   SubCutaneous three times a day before meals  melatonin 3 milliGRAM(s) Oral at bedtime PRN  meropenem  IVPB 500 milliGRAM(s) IV Intermittent every 24 hours  metoprolol tartrate 50 milliGRAM(s) Oral two times a day  ondansetron Injectable 4 milliGRAM(s) IV Push every 8 hours PRN  sevelamer carbonate 800 milliGRAM(s) Oral three times a day    61 y/o with a PMHx of DM, HTN, ESRD (on dialysis M/W/F, last session on 10/14), CAD s/p 1 stent on 2008 and quadruple CABG on 2012, PAD s/p bilateral angioplasty at [facility], was transferred from [facility] complaining of weakness and unhealed wound in the left foot.    1. 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 hx of PAD   - Podiatry following - must be off plavix for 5 days - last dose on 10/16  - IR for angiogram:pending   - Blood cx:NTD   - MRI L foot done:  a) Osteomyelitis and erosion of the calcaneus with a vertical pathologic fracture extending to the subtalar joint. Gangrene of overlying subcutaneous tissues. 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  - ID consult:  a)  continue meropenem 500 mg q 24 hours   b) please check [facility] for susceptibility report (specifically for Gentamicin and Amikacin) to check as option for post HD antibiotics   c) will likely need at least 6 weeks for calcaneal OM   d)  podiatry on-board - will follow any surgical plans       2. ESRD on HD  - HD as per nephrology   - renal following    3. CAD s/p stent and CABG   - on statin   - Discuss with cardio. we're unable to hold aspirin. Cont aspirin (update IR and Podiatry)   - holding plavix since 10/16  - continue BB  - Cardio consult appreciated. at moderate risk for surgery     4. DM II  - hypoglycemic this am  - Lantus was decreased to 15 units tonight               Case Discussed with House Staff   36  minutes spent on total encounter; more than 50% of the visit was spent counseling and/or coordinating care by the attending physician.   Spectra [phone]

## 2022-10-19 NOTE — PROGRESS NOTE ADULT - ASSESSMENT
· Assessment	   61 y/o with a PMHx of DM, HTN, ESRD (on dialysis M/W/F, last session on 10/14), CAD s/p 1 stent on 2008 and quadruple CABG on 2012, PAD s/p bilateral angioplasty at [facility], was transferred from [facility] complaining of weakness and unhealed wound in the left foot. Scheduled for excisional debridement per podiatry on Friday, 10/21.   10/19 pending angiogram + revascularization per vascular/IR    #Left Foot wound, likely OM  - Podiatry consult appreciated, scheduled for OR on 10/21 debridement, revascularization anticipated today 10/18  - Will need medical clearance and risk stratification for OR  - X-ray L foot  - MRI L foot: Osteomyelitis and erosion of the calcaneus with a vertical pathologic fracture extending to the subtalar joint. Gangrene of overlying subcutaneous tissues. 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  - Wound cx from 9/16 positive for Enterobacter cloacae, patient was treated with 2x weeks Post-HD Vanc + cefepime   - ESBL E coli bacteremia from [facility] cultures  - Per ID - c/w IV Meropenem 500mg  - F/u Blood cultures  - F/u labs  - Pain control, monitor for fevers, monitor vitals      #Leg weakness likely secondary to PAD  - Vascular Sx c/s, will get lower extremity arterial duplex (most recent one in september)  - Vascular planning for angio +/- angioplasty today or tomrrow of LLE, will f/u post-op    #ESRD  - Nephro following, HD today  - Monitor labs, drawn in HD today    #CAD  - c/w statin  - Holding ASA starting today 10/18th (3 days prior to OR on Friday 10/21)  - Holding Plavix currently (held 5 days prior to OR)  - c/w Lopressor 50mg  - c/w Lasix 40mg PO BID  - Cardio c/s pre-op clearance    #DM  - Insulin sliding scale   - Pt takes 36 units of Lispro at home and trulicity 1/weel, decreased PM lantus after hypoglycemic episode 2 days ago this admission    #MISC  - Diet: DASH  - Activity: Ambulate as tolerated   - DVT PPX: SQ Heparin bid  - Pending: Vascular SX, Podiatry interventions · Assessment	   63 y/o with a PMHx of DM, HTN, ESRD (on dialysis M/W/F, last session on 10/14), CAD s/p 1 stent on 2008 and quadruple CABG on 2012, PAD s/p bilateral angioplasty at [facility], was transferred from [facility] complaining of weakness and unhealed wound in the left foot. Scheduled for excisional debridement per podiatry on Friday, 10/21.   10/19 pending angiogram + revascularization per vascular/IR    #Left Foot wound, likely OM  - Angio/revascularization w/IR tomorrow 10/20, OR 10/21 w/podiatry  - Podiatry consult appreciated, scheduled for OR on 10/21 debridement, revascularization anticipated today 10/18  - Will need medical clearance and risk stratification for OR  - X-ray L foot  - MRI L foot: Osteomyelitis and erosion of the calcaneus with a vertical pathologic fracture extending to the subtalar joint. Gangrene of overlying subcutaneous tissues. 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  - Wound cx from 9/16 positive for Enterobacter cloacae, patient was treated with 2x weeks Post-HD Vanc + cefepime   - ESBL E coli bacteremia from [facility] cultures  - Per ID - c/w IV Meropenem 500mg  - F/u Blood cultures  - F/u labs  - Pain control, monitor for fevers, monitor vitals      #Leg weakness likely secondary to PAD  - Vascular Sx c/s, will get lower extremity arterial duplex (most recent one in september)  - Vascular planning for angio +/- angioplasty today or tomrrow of LLE, will f/u post-op    #ESRD  - Nephro following, HD today  - Monitor labs, drawn in HD today    #CAD  - c/w statin  - Holding ASA starting today 10/18th (3 days prior to OR on Friday 10/21)  - Holding Plavix currently (held 5 days prior to OR)  - c/w Lopressor 50mg  - c/w Lasix 40mg PO BID  - Cardio c/s pre-op clearance    #DM  - Insulin sliding scale   - Pt takes 36 units of Lispro at home and trulicity 1/weel, decreased PM lantus after hypoglycemic episode 2 days ago this admission    #MISC  - Diet: DASH  - Activity: Ambulate as tolerated   - DVT PPX: SQ Heparin bid  - Pending: Vascular SX, Podiatry interventions · Assessment	   63 y/o with a PMHx of DM, HTN, ESRD (on dialysis M/W/F, last session on 10/14), CAD s/p 1 stent on 2008 and quadruple CABG on 2012, PAD s/p bilateral angioplasty at [facility], was transferred from [facility] complaining of weakness and unhealed wound in the left foot. Scheduled for excisional debridement per podiatry on Friday, 10/21.   10/19 pending angiogram + revascularization per vascular/IR    #Left Foot wound, likely OM  - Angio/revascularization w/IR tomorrow 10/20, OR 10/21 w/podiatry  - Podiatry consult appreciated, scheduled for OR on 10/21 debridement, revascularization anticipated today 10/18  - Will need medical clearance and risk stratification for OR  - X-ray L foot  - MRI L foot: Osteomyelitis and erosion of the calcaneus with a vertical pathologic fracture extending to the subtalar joint. Gangrene of overlying subcutaneous tissues. 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  - Wound cx from 9/16 positive for Enterobacter cloacae, patient was treated with 2x weeks Post-HD Vanc + cefepime   - ESBL E coli bacteremia from [facility] cultures  - Per ID - c/w IV Meropenem 500mg  - F/u Blood cultures  - F/u labs  - Pain control, monitor for fevers, monitor vitals      #Leg weakness likely secondary to PAD  - Vascular Sx c/s, will get lower extremity arterial duplex (most recent one in september)  - Vascular planning for angio +/- angioplasty today or tomrrow of LLE, will f/u post-op    #ESRD  - Nephro following, HD today  - Monitor labs, drawn in HD today    #CAD  - c/w statin  - Per cardiology, aspirin should not be held given MI risk and will restart asa 10/19   - Holding Plavix currently (held 5 days prior to OR)  - c/w Lopressor 50mg  - c/w Lasix 40mg PO BID  - Cardio c/s pre-op clearance    #DM  - Insulin sliding scale   - Pt takes 36 units of Lispro at home and trulicity 1/weel, decreased PM lantus after hypoglycemic episode 2 days ago this admission    #MISC  - Diet: DASH  - Activity: Ambulate as tolerated   - DVT PPX: SQ Heparin bid  - Pending: Vascular SX, Podiatry interventions

## 2022-10-20 LAB
ALBUMIN SERPL ELPH-MCNC: 2.4 G/DL — LOW (ref 3.5–5.2)
ALP SERPL-CCNC: 149 U/L — HIGH (ref 30–115)
ALT FLD-CCNC: 7 U/L — SIGNIFICANT CHANGE UP (ref 0–41)
ANION GAP SERPL CALC-SCNC: 10 MMOL/L — SIGNIFICANT CHANGE UP (ref 7–14)
APTT BLD: 32 SEC — SIGNIFICANT CHANGE UP (ref 27–39.2)
AST SERPL-CCNC: 12 U/L — SIGNIFICANT CHANGE UP (ref 0–41)
BILIRUB SERPL-MCNC: 0.2 MG/DL — SIGNIFICANT CHANGE UP (ref 0.2–1.2)
BLD GP AB SCN SERPL QL: SIGNIFICANT CHANGE UP
BUN SERPL-MCNC: 38 MG/DL — HIGH (ref 10–20)
CALCIUM SERPL-MCNC: 8 MG/DL — LOW (ref 8.4–10.5)
CHLORIDE SERPL-SCNC: 96 MMOL/L — LOW (ref 98–110)
CO2 SERPL-SCNC: 29 MMOL/L — SIGNIFICANT CHANGE UP (ref 17–32)
CREAT SERPL-MCNC: 4.9 MG/DL — CRITICAL HIGH (ref 0.7–1.5)
CULTURE RESULTS: SIGNIFICANT CHANGE UP
EGFR: 12 ML/MIN/1.73M2 — LOW
GLUCOSE BLDC GLUCOMTR-MCNC: 156 MG/DL — HIGH (ref 70–99)
GLUCOSE BLDC GLUCOMTR-MCNC: 86 MG/DL — SIGNIFICANT CHANGE UP (ref 70–99)
GLUCOSE BLDC GLUCOMTR-MCNC: 98 MG/DL — SIGNIFICANT CHANGE UP (ref 70–99)
GLUCOSE SERPL-MCNC: 91 MG/DL — SIGNIFICANT CHANGE UP (ref 70–99)
HCT VFR BLD CALC: 23.3 % — LOW (ref 42–52)
HGB BLD-MCNC: 7.6 G/DL — LOW (ref 14–18)
INR BLD: 1.1 RATIO — SIGNIFICANT CHANGE UP (ref 0.65–1.3)
MAGNESIUM SERPL-MCNC: 1.9 MG/DL — SIGNIFICANT CHANGE UP (ref 1.8–2.4)
MCHC RBC-ENTMCNC: 31.8 PG — HIGH (ref 27–31)
MCHC RBC-ENTMCNC: 32.6 G/DL — SIGNIFICANT CHANGE UP (ref 32–37)
MCV RBC AUTO: 97.5 FL — HIGH (ref 80–94)
NRBC # BLD: 0 /100 WBCS — SIGNIFICANT CHANGE UP (ref 0–0)
PLATELET # BLD AUTO: 360 K/UL — SIGNIFICANT CHANGE UP (ref 130–400)
POTASSIUM SERPL-MCNC: 4.4 MMOL/L — SIGNIFICANT CHANGE UP (ref 3.5–5)
POTASSIUM SERPL-SCNC: 4.4 MMOL/L — SIGNIFICANT CHANGE UP (ref 3.5–5)
PROT SERPL-MCNC: 6.2 G/DL — SIGNIFICANT CHANGE UP (ref 6–8)
PROTHROM AB SERPL-ACNC: 12.6 SEC — SIGNIFICANT CHANGE UP (ref 9.95–12.87)
RBC # BLD: 2.39 M/UL — LOW (ref 4.7–6.1)
RBC # FLD: 15.8 % — HIGH (ref 11.5–14.5)
SODIUM SERPL-SCNC: 135 MMOL/L — SIGNIFICANT CHANGE UP (ref 135–146)
SPECIMEN SOURCE: SIGNIFICANT CHANGE UP
WBC # BLD: 10 K/UL — SIGNIFICANT CHANGE UP (ref 4.8–10.8)
WBC # FLD AUTO: 10 K/UL — SIGNIFICANT CHANGE UP (ref 4.8–10.8)

## 2022-10-20 PROCEDURE — 99232 SBSQ HOSP IP/OBS MODERATE 35: CPT

## 2022-10-20 PROCEDURE — 75710 ARTERY X-RAYS ARM/LEG: CPT | Mod: 26,LT

## 2022-10-20 PROCEDURE — 36140 INTRO NDL ICATH UPR/LXTR ART: CPT | Mod: LT

## 2022-10-20 RX ADMIN — Medication 50 MILLIGRAM(S): at 17:22

## 2022-10-20 RX ADMIN — Medication 50 MILLIGRAM(S): at 05:33

## 2022-10-20 RX ADMIN — ATORVASTATIN CALCIUM 40 MILLIGRAM(S): 80 TABLET, FILM COATED ORAL at 22:06

## 2022-10-20 RX ADMIN — SEVELAMER CARBONATE 800 MILLIGRAM(S): 2400 POWDER, FOR SUSPENSION ORAL at 22:06

## 2022-10-20 RX ADMIN — SEVELAMER CARBONATE 800 MILLIGRAM(S): 2400 POWDER, FOR SUSPENSION ORAL at 05:33

## 2022-10-20 RX ADMIN — MEROPENEM 100 MILLIGRAM(S): 1 INJECTION INTRAVENOUS at 05:33

## 2022-10-20 RX ADMIN — INSULIN GLARGINE 15 UNIT(S): 100 INJECTION, SOLUTION SUBCUTANEOUS at 23:00

## 2022-10-20 NOTE — PROGRESS NOTE ADULT - SUBJECTIVE AND OBJECTIVE BOX
LAWRENCE SCHWABACHER 62y Male  MRN#: [id number]   Hospital Day: 6d    SUBJECTIVE  Patient is a 62y old Male who presents with a chief complaint of Sepsis (20 Oct 2022 08:11)  Currently admitted to medicine with the primary diagnosis of     INTERVAL HPI AND OVERNIGHT EVENTS:  Patient was examined and seen at bedside. This morning he is resting comfortably in bed and reports no issues or overnight events. NPO since midnight awaiting angio with IR this AM. No further vomiting/nausea. No cp or sob, minimal L foot pain. Will f/u after IR procedure today. Patient scheduled with podiatry tomorrow 10/21 for excisional debridement. Otherwise patient feeling generally well, no fevers or chills, vital signs stable, no acute events.     REVIEW OF SYMPTOMS:  Relevant ros per above    OBJECTIVE  PAST MEDICAL & SURGICAL HISTORY  CAD (coronary artery disease)  1 stent 2008    S/P CABG (coronary artery bypass graft)  x4    Diabetes mellitus    Transient ischemic attack (TIA)  2017; 2008    Chronic kidney disease (CKD)  Stage IV    Hypertension    Stented coronary artery  in 2008    Myocardial infarction  2012    PAD (peripheral artery disease)  S/p bypass left leg    HLD (hyperlipidemia)    BPH (benign prostatic hyperplasia)    Pain in left knee  s/p fall    OA (osteoarthritis)    [place] (hard of hearing)    Chronic anemia    S/P CABG (coronary artery bypass graft)  2012    H/O arterial bypass of lower limb  Left Lower Extremity (2016)    History of surgery  Left CEA (2017)  Left Pinkie toe Amputation (2014)  CABG x 4 (2012)  Card cath - stent (2008)      AV fistula  2019  LEFT AV FISTULA      ALLERGIES:  No Known Allergies    MEDICATIONS:  STANDING MEDICATIONS  aspirin enteric coated 81 milliGRAM(s) Oral daily  atorvastatin 40 milliGRAM(s) Oral at bedtime  darbepoetin Injectable Syringe 25 MICROGram(s) IV Push <User Schedule>  dextrose 10% Bolus 125 milliLiter(s) IV Bolus once  dextrose 5%. 1000 milliLiter(s) IV Continuous <Continuous>  dextrose 5%. 1000 milliLiter(s) IV Continuous <Continuous>  dextrose 5%. 1000 milliLiter(s) IV Continuous <Continuous>  dextrose 50% Injectable 25 Gram(s) IV Push once  dextrose 50% Injectable 12.5 Gram(s) IV Push once  dextrose 50% Injectable 25 Gram(s) IV Push once  glucagon  Injectable 1 milliGRAM(s) IntraMuscular once  influenza   Vaccine 0.5 milliLiter(s) IntraMuscular once  insulin glargine Injectable (LANTUS) 15 Unit(s) SubCutaneous at bedtime  insulin lispro (ADMELOG) corrective regimen sliding scale   SubCutaneous three times a day before meals  meropenem  IVPB 500 milliGRAM(s) IV Intermittent every 24 hours  metoprolol tartrate 50 milliGRAM(s) Oral two times a day  sevelamer carbonate 800 milliGRAM(s) Oral three times a day    PRN MEDICATIONS  acetaminophen     Tablet .. 650 milliGRAM(s) Oral every 6 hours PRN  aluminum hydroxide/magnesium hydroxide/simethicone Suspension 30 milliLiter(s) Oral every 4 hours PRN  dextrose Oral Gel 15 Gram(s) Oral once PRN  melatonin 3 milliGRAM(s) Oral at bedtime PRN  ondansetron Injectable 4 milliGRAM(s) IV Push every 8 hours PRN      VITAL SIGNS: Last 24 Hours  T(C): 36.5 (20 Oct 2022 08:56), Max: 36.5 (20 Oct 2022 08:56)  T(F): 97.7 (20 Oct 2022 08:56), Max: 97.7 (20 Oct 2022 08:56)  HR: 68 (20 Oct 2022 08:56) (68 - 81)  BP: 156/64 (20 Oct 2022 08:56) (129/60 - 167/86)  BP(mean): --  RR: 18 (20 Oct 2022 08:56) (18 - 18)  SpO2: 100% (20 Oct 2022 08:56) (97% - 100%)    LABS:                        7.6    10.00 )-----------( 360      ( 20 Oct 2022 08:08 )             23.3     10-20    135  |  96<L>  |  38<H>  ----------------------------<  91  4.4   |  29  |  4.9<HH>    Ca    8.0<L>      20 Oct 2022 08:08  Mg     1.9     10-20    TPro  6.2  /  Alb  2.4<L>  /  TBili  0.2  /  DBili  x   /  AST  12  /  ALT  7   /  AlkPhos  149<H>  10-20    PT/INR - ( 20 Oct 2022 08:08 )   PT: 12.60 sec;   INR: 1.10 ratio         PTT - ( 20 Oct 2022 08:08 )  PTT:32.0 sec      RADIOLOGY:      PHYSICAL EXAM:  CONSTITUTIONAL: No acute distress, well-developed, well-groomed, AAOx3  HEAD: Atraumatic, normocephalic  EYES: EOM intact, conjunctiva and sclera clear, non-injected, anicteric  ENT: Supple, no masses, no thyromegaly, no bruits, no JVD; moist mucous membranes  PULMONARY: Clear to auscultation bilaterally; no wheezes, rales, or rhonchi  CARDIOVASCULAR: Regular rate and rhythm; no murmurs, rubs, or gallops  GASTROINTESTINAL: Soft, non-tender, + increased resonance to percussion, obese habitus; bowel sounds present  MUSCULOSKELETAL: 2+ peripheral pulses; no clubbing, no cyanosis, no edema, surgical wrap in place over L foot, non-tender to palpation  NEUROLOGY: non-focal, moving extremities, normal strength, no facial droop  SKIN: No rashes or lesions; warm and dry

## 2022-10-20 NOTE — PROCEDURE NOTE - PROCEDURE FINDINGS AND DETAILS
Left lower extremity angiogram demonstrating chronically occluded PT and AT with patent anterior tibial vein bypass reconstituting into the DP. No microvascular outflow demonstrates within the calcaneous. No hemodynamically significant stenosis within the left SFA and popliteal artery. No intervention performed    5 Sudanese mynx arterial closure device deployed.

## 2022-10-20 NOTE — PROGRESS NOTE ADULT - ASSESSMENT
· Assessment	   61 y/o with a PMHx of DM, HTN, ESRD (on dialysis M/W/F, last session on 10/14), CAD s/p 1 stent on 2008 and quadruple CABG on 2012, PAD s/p bilateral angioplasty at [facility], was transferred from [facility] complaining of weakness and unhealed wound in the left foot. Scheduled for excisional debridement per podiatry on Friday, 10/21.   10/20 going for angio with IR this AM for revascularization    #Left Foot wound, likely OM  - Angio/revascularization w/IR today 10/20, OR 10/21 w/podiatry  - NPO after MN tonight 10/20  - Patient remains stable, afebrile  - F/u post-op  - Will need medical clearance and risk stratification for OR  - X-ray L foot  - MRI L foot: Osteomyelitis and erosion of the calcaneus with a vertical pathologic fracture extending to the subtalar joint. Gangrene of overlying subcutaneous tissues. 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  - Wound cx from 9/16 positive for Enterobacter cloacae, patient was treated with 2x weeks Post-HD Vanc + cefepime   - ESBL E coli bacteremia from [facility] cultures  - Per ID - c/w IV Meropenem 500mg  - F/u Blood cultures  - F/u labs, has active T&S, coags  - Pain control, monitor for fevers, monitor vitals  - Plavix is held, ASA is continued as per cardio given minimal bleed risk and elevated MI risk off asa    #Leg weakness likely secondary to PAD  - Vascular Sx c/s, will get lower extremity arterial duplex (most recent one in september)  - IR today for angio/revascularization, f/u    #ESRD  - Nephro following, HD yesterday  - Monitor labs    #CAD  - c/w statin  - Per cardiology, aspirin should not be held given MI risk and will restart asa 10/19   - Holding Plavix currently (held 5 days prior to OR)  - c/w Lopressor 50mg  - c/w Lasix 40mg PO BID  - Cardio c/s pre-op clearance    #DM  - Insulin sliding scale   - Pt takes 36 units of Lispro at home and trulicity 1/weel, decreased PM lantus after hypoglycemic episode 2 days ago this admission    #Vomiting - resolved  - Emesis yesterday likely 2/2 to eating a big meal late at night, will f/u with patient for n/v control   - Diet will resume today, and npo after mn again    #MISC  - Diet: DASH  - Activity: Ambulate as tolerated   - DVT PPX: SQ Heparin bid  - Pending: Vascular SX, Podiatry interventions

## 2022-10-20 NOTE — PROGRESS NOTE ADULT - SUBJECTIVE AND OBJECTIVE BOX
SCHWABACHER, LAWRENCE  62y  [facility] [id number]      Patient is a 62y old  Male who presents with a chief complaint of Sepsis (19 Oct 2022 14:02)      INTERVAL HPI/OVERNIGHT EVENTS:  Patient feels well. he has no complains. no pain. no overnight events  he's agreeable with the plan.   he reported hx of stent and cabg in the past and was told not to hold aspirin.       REVIEW OF SYSTEMS:        FAMILY HISTORY:  Family history of heart disease (Father)    DM (diabetes mellitus) (Mother)    ESRD (end stage renal disease) on dialysis (Mother)      T(C): 35.8 (10-19-22 @ 12:58), Max: 36.3 (10-18-22 @ 21:14)  HR: 78 (10-19-22 @ 12:58) (69 - 79)  BP: 134/60 (10-19-22 @ 12:58) (134/60 - 167/86)  RR: 18 (10-19-22 @ 12:58) (18 - 18)  SpO2: 99% (10-19-22 @ 12:58) (99% - 99%)  Wt(kg): --Vital Signs Last 24 Hrs  T(C): 35.8 (19 Oct 2022 12:58), Max: 36.3 (18 Oct 2022 21:14)  T(F): 96.5 (19 Oct 2022 12:58), Max: 97.3 (18 Oct 2022 21:14)  HR: 78 (19 Oct 2022 12:58) (69 - 79)  BP: 134/60 (19 Oct 2022 12:58) (134/60 - 167/86)  BP(mean): --  RR: 18 (19 Oct 2022 12:58) (18 - 18)  SpO2: 99% (19 Oct 2022 12:58) (99% - 99%)    Parameters below as of 19 Oct 2022 12:58  Patient On (Oxygen Delivery Method): room air        PHYSICAL EXAM:  GENERAL: NAD, well-groomed, well-developed  PULM: Clear to auscultation bilaterally  CARDIAC: Regular rate and rhythm;  GI: Soft, Nontender, Nondistended; Bowel sounds present  EXTREMITIES:  left foot swollen and wrapped     Consultant(s) Notes Reviewed:  [x ] YES  [ ] NO  Care Discussed with Consultants/Other Providers [ x] YES  [ ] NO    LABS:                            7.4    11.48 )-----------( 389      ( 19 Oct 2022 09:20 )             22.7   10-19    133<L>  |  97<L>  |  53<H>  ----------------------------<  91  4.9   |  25  |  5.7<HH>    Ca    8.0<L>      19 Oct 2022 09:20  Phos  3.0     10-18  Mg     2.0     10-19    TPro  6.2  /  Alb  2.3<L>  /  TBili  0.2  /  DBili  x   /  AST  12  /  ALT  6   /  AlkPhos  143<H>  10-19            Culture - Abscess with Gram Stain (collected 16 Oct 2022 17:50)  Source: .Abscess left foot wound  Preliminary Report (18 Oct 2022 19:12):    Rare Morganella morganii    Rare Enterobacter cloacae complex    Rare Proteus vulgaris group    Rare Enterococcus faecium (vancomycin resistant)  Organism: Morganella morganii  Enterobacter cloacae complex  Proteus vulgaris group  Enterococcus faecium (vancomycin resistant) (18 Oct 2022 19:11)  Organism: Enterococcus faecium (vancomycin resistant) (18 Oct 2022 19:11)  Organism: Proteus vulgaris group (18 Oct 2022 19:05)  Organism: Enterobacter cloacae complex (18 Oct 2022 19:05)  Organism: Morganella morganii (18 Oct 2022 19:05)      acetaminophen     Tablet .. 650 milliGRAM(s) Oral every 6 hours PRN  aluminum hydroxide/magnesium hydroxide/simethicone Suspension 30 milliLiter(s) Oral every 4 hours PRN  aspirin enteric coated 81 milliGRAM(s) Oral daily  atorvastatin 40 milliGRAM(s) Oral at bedtime  darbepoetin Injectable Syringe 25 MICROGram(s) IV Push <User Schedule>  dextrose 10% Bolus 125 milliLiter(s) IV Bolus once  dextrose 5%. 1000 milliLiter(s) IV Continuous <Continuous>  dextrose 5%. 1000 milliLiter(s) IV Continuous <Continuous>  dextrose 5%. 1000 milliLiter(s) IV Continuous <Continuous>  dextrose 50% Injectable 25 Gram(s) IV Push once  dextrose 50% Injectable 12.5 Gram(s) IV Push once  dextrose 50% Injectable 25 Gram(s) IV Push once  dextrose Oral Gel 15 Gram(s) Oral once PRN  glucagon  Injectable 1 milliGRAM(s) IntraMuscular once  influenza   Vaccine 0.5 milliLiter(s) IntraMuscular once  insulin glargine Injectable (LANTUS) 15 Unit(s) SubCutaneous at bedtime  insulin lispro (ADMELOG) corrective regimen sliding scale   SubCutaneous three times a day before meals  melatonin 3 milliGRAM(s) Oral at bedtime PRN  meropenem  IVPB 500 milliGRAM(s) IV Intermittent every 24 hours  metoprolol tartrate 50 milliGRAM(s) Oral two times a day  ondansetron Injectable 4 milliGRAM(s) IV Push every 8 hours PRN  sevelamer carbonate 800 milliGRAM(s) Oral three times a day    61 y/o with a PMHx of DM, HTN, ESRD (on dialysis M/W/F, last session on 10/14), CAD s/p 1 stent on 2008 and quadruple CABG on 2012, PAD s/p bilateral angioplasty at [facility], was transferred from [facility] complaining of weakness and unhealed wound in the left foot.    1. 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 hx of PAD   - Podiatry following - must be off plavix for 5 days - last dose on 10/16  - IR for angiogram:pending   - Blood cx:NTD   - MRI L foot done:  a) Osteomyelitis and erosion of the calcaneus with a vertical pathologic fracture extending to the subtalar joint. Gangrene of overlying subcutaneous tissues. 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  - ID consult:  a)  continue meropenem 500 mg q 24 hours   b) please check [facility] for susceptibility report (specifically for Gentamicin and Amikacin) to check as option for post HD antibiotics   c) will likely need at least 6 weeks for calcaneal OM   d)  podiatry on-board - will follow any surgical plans       2. ESRD on HD  - HD as per nephrology   - renal following    3. CAD s/p stent and CABG   - on statin   - Discuss with cardio. we're unable to hold aspirin. Cont aspirin (update IR and Podiatry)   - holding plavix since 10/16  - continue BB  - Cardio consult appreciated. at moderate risk for surgery     4. DM II  - hypoglycemic this am  - Lantus was decreased to 15 units tonight         Case Discussed with House Staff   36  minutes spent on total encounter; more than 50% of the visit was spent counseling and/or coordinating care by the attending physician.   Spectra [phone]

## 2022-10-20 NOTE — PROGRESS NOTE ADULT - ASSESSMENT
ASSESSMENT  63 y/o with a PMHx of DM, HTN, ESRD (on dialysis M/W/F, last session on 10/14), CAD s/p 1 stent on 2008 and quadruple CABG on 2012, PAD s/p bilateral angioplasty at [facility], was transferred from [facility] complaining of weakness and unhealed wound in the left foot, found to have OM of calcaneus bone and ESBL E coli bacteremia.    IMPRESSION  #Left Calcaneous OM  - s/p removal of FB 9/16  - Wound Cx 9/16 - Enterobacter cloacae  - treated with two weeks post-HD vancomycin + cefepime  - wound CX 10/16 VRE Faecim, Enterobacter cloacae complex, Proteus vulgaris, Morganella morganii     #ESBL E coli Bacteremia at [facility]     #ESRD on HD  #CAD s/p CABG  #PAD s/p bilateral angioplasty  #DM  #Abx allergy:      RECOMMENDATIONS  - noted WOund Cx, will tailor final antibiotics based on debridement cx   - continue meropenem 500 mg q 24 hours   - please check [facility] for susceptibility report (specifically for Gentamicin and Amikacin) to check as option for post HD antibiotics   - planned for angiogram today - potential OR debridement 10/21   - will likely need at least 6 weeks for calcaneal OM       Please call or message on [employer] Teams if with any questions.  Spectra [phone]

## 2022-10-20 NOTE — PROGRESS NOTE ADULT - SUBJECTIVE AND OBJECTIVE BOX
SCHWABACHER, LAWRENCE  62y, Male  Allergy: No Known Allergies      LOS  6d    CHIEF COMPLAINT: Sepsis (19 Oct 2022 15:22)      INTERVAL EVENTS/HPI  - No acute events overnight  - T(F): , Max: 97.4 (10-19-22 @ 20:00)  - Denies any worsening symptoms  - Tolerating medication  - WBC Count: 11.55 (10-19-22 @ 20:50)  WBC Count: 11.48 (10-19-22 @ 09:20)     - Creatinine, Serum: 4.2 (10-19-22 @ 20:50)  Creatinine, Serum: 5.7 (10-19-22 @ 09:20)       ROS  General: Denies rigors, nightsweats  HEENT: Denies headache, rhinorrhea, sore throat, eye pain  CV: Denies CP, palpitations  PULM: Denies wheezing, hemoptysis  GI: Denies hematemesis, hematochezia, melena  : Denies discharge, hematuria  MSK: Denies arthralgias, myalgias  SKIN: Denies rash, lesions  NEURO: Denies paresthesias, weakness  PSYCH: Denies depression, anxiety    VITALS:  T(F): 96.1, Max: 97.4 (10-19-22 @ 20:00)  HR: 81  BP: 129/60  RR: 18Vital Signs Last 24 Hrs  T(C): 35.6 (20 Oct 2022 05:01), Max: 36.3 (19 Oct 2022 20:00)  T(F): 96.1 (20 Oct 2022 05:01), Max: 97.4 (19 Oct 2022 20:00)  HR: 81 (20 Oct 2022 05:01) (69 - 81)  BP: 129/60 (20 Oct 2022 05:01) (129/60 - 167/86)  BP(mean): --  RR: 18 (20 Oct 2022 05:01) (18 - 18)  SpO2: 97% (19 Oct 2022 20:00) (97% - 99%)    Parameters below as of 19 Oct 2022 20:00  Patient On (Oxygen Delivery Method): room air        PHYSICAL EXAM:  Gen: NAD, resting in bed  HEENT: Normocephalic, atraumatic  Neck: supple, no lymphadenopathy  CV: Regular rate & regular rhythm  Lungs: decreased BS at bases, no fremitus  Abdomen: Soft, BS present  Ext: Warm, well perfused  Neuro: non focal, awake  Skin: no rash, no erythema  Lines: no phlebitis    FH: Non-contributory  Social Hx: Non-contributory    TESTS & MEASUREMENTS:                        8.1    11.55 )-----------( 406      ( 19 Oct 2022 20:50 )             25.3     10-19    137  |  96<L>  |  33<H>  ----------------------------<  226<H>  5.0   |  31  |  4.2<HH>    Ca    8.3<L>      19 Oct 2022 20:50  Mg     1.9     10-19    TPro  6.7  /  Alb  2.6<L>  /  TBili  0.2  /  DBili  x   /  AST  14  /  ALT  7   /  AlkPhos  169<H>  10-19      LIVER FUNCTIONS - ( 19 Oct 2022 20:50 )  Alb: 2.6 g/dL / Pro: 6.7 g/dL / ALK PHOS: 169 U/L / ALT: 7 U/L / AST: 14 U/L / GGT: x               Culture - Abscess with Gram Stain (collected 10-16-22 @ 17:50)  Source: .Abscess left foot wound  Preliminary Report (10-18-22 @ 19:12):    Rare Morganella morganii    Rare Enterobacter cloacae complex    Rare Proteus vulgaris group    Rare Enterococcus faecium (vancomycin resistant)  Organism: Morganella morganii  Enterobacter cloacae complex  Proteus vulgaris group  Enterococcus faecium (vancomycin resistant) (10-18-22 @ 19:11)  Organism: Enterococcus faecium (vancomycin resistant) (10-18-22 @ 19:11)      -  Ampicillin: R >8 Predicts results to ampicillin/sulbactam, amoxacillin-clavulanate and  piperacillin-tazobactam.      -  Daptomycin: SDD 4 The breakpoint for SDD (sensitive dose dependent)is based on a dosage regimen of 8-12 mg/kg administered every 24 h in adults and is intended for serious infections due to E. faecium. Consultation with an infectious diseases specialist is recommended.      -  Levofloxacin: R >4      -  Linezolid: S 1      -  Tetra/Doxy: R >8      -  Vancomycin: R >16      Method Type: CONNER  Organism: Proteus vulgaris group (10-18-22 @ 19:05)      -  Amikacin: S <=16      -  Amoxicillin/Clavulanic Acid: S <=8/4      -  Ampicillin: R >16 These ampicillin results predict results for amoxicillin      -  Ampicillin/Sulbactam: S <=4/2 Enterobacter, Klebsiella aerogenes, Citrobacter, and Serratia may develop resistance during prolonged therapy (3-4 days)      -  Aztreonam: S <=4      -  Cefazolin: R >16 Enterobacter, Klebsiella aerogenes, Citrobacter, and Serratia may develop resistance during prolonged therapy (3-4 days)      -  Cefepime: S <=2      -  Cefoxitin: S <=8      -  Ceftriaxone: S <=1 Enterobacter, Klebsiella aerogenes, Citrobacter, and Serratia may develop resistance during prolonged therapy      -  Ciprofloxacin: S <=0.25      -  Ertapenem: S <=0.5      -  Gentamicin: S <=2      -  Levofloxacin: S <=0.5      -  Meropenem: S <=1      -  Piperacillin/Tazobactam: S <=8      -  Tobramycin: S <=2      -  Trimethoprim/Sulfamethoxazole: S <=0.5/9.5      Method Type: CONNER  Organism: Enterobacter cloacae complex (10-18-22 @ 19:05)      -  Amikacin: S <=16      -  Amoxicillin/Clavulanic Acid: R 16/8      -  Ampicillin: R >16 These ampicillin results predict results for amoxicillin      -  Ampicillin/Sulbactam: R <=4/2 Enterobacter, Klebsiella aerogenes, Citrobacter, and Serratia may develop resistance during prolonged therapy (3-4 days)      -  Aztreonam: S 8      -  Cefazolin: R >16 Enterobacter, Klebsiella aerogenes, Citrobacter, and Serratia may develop resistance during prolonged therapy (3-4 days)      -  Cefepime: S <=2      -  Cefoxitin: R 16      -  Ceftriaxone: R >32 Enterobacter, Klebsiella aerogenes, Citrobacter, and Serratia may develop resistance during prolonged therapy      -  Ciprofloxacin: S <=0.25      -  Ertapenem: S <=0.5      -  Gentamicin: S <=2      -  Imipenem: S <=1      -  Levofloxacin: S <=0.5      -  Meropenem: S <=1      -  Piperacillin/Tazobactam: S <=8      -  Tobramycin: S <=2      -  Trimethoprim/Sulfamethoxazole: S 1/19      Method Type: CONNER  Organism: Morganella morganii (10-18-22 @ 19:05)      -  Amikacin: S <=16      -  Amoxicillin/Clavulanic Acid: R >16/8      -  Ampicillin: R >16 These ampicillin results predict results for amoxicillin      -  Ampicillin/Sulbactam: I 16/8 Enterobacter, Klebsiella aerogenes, Citrobacter, and Serratia may develop resistance during prolonged therapy (3-4 days)      -  Aztreonam: S <=4      -  Cefazolin: R >16 Enterobacter, Klebsiella aerogenes, Citrobacter, and Serratia may develop resistance during prolonged therapy (3-4 days)      -  Cefepime: S <=2      -  Cefoxitin: S <=8      -  Ceftriaxone: S <=1 Enterobacter, Klebsiella aerogenes, Citrobacter, and Serratia may develop resistance during prolonged therapy      -  Ciprofloxacin: S <=0.25      -  Ertapenem: S <=0.5      -  Gentamicin: S <=2      -  Imipenem: S <=1      -  Levofloxacin: S <=0.5      -  Meropenem: S <=1      -  Piperacillin/Tazobactam: S <=8      -  Tobramycin: S <=2      -  Trimethoprim/Sulfamethoxazole: S <=0.5/9.5      Method Type: CONNER    Culture - Blood (collected 10-15-22 @ 12:07)  Source: .Blood None  Preliminary Report (10-16-22 @ 21:01):    No growth to date.            INFECTIOUS DISEASES TESTING  COVID-19 PCR: NotDetec (10-17-22 @ 20:19)  COVID-19 PCR: Detected (09-20-22 @ 12:50)  COVID-19 PCR: NotDetec (09-14-22 @ 13:45)  COVID-19 PCR: NotDetec (09-06-22 @ 01:35)  COVID-19 PCR: NotDetec (03-02-22 @ 14:15)  COVID-19 PCR: NotDetec (02-27-22 @ 15:30)  COVID-19 PCR: NotDetec (02-23-22 @ 11:50)  COVID-19 PCR: NotDetec (02-21-22 @ 10:25)      INFLAMMATORY MARKERS  C-Reactive Protein, Serum: 114.2 mg/L (10-16-22 @ 12:26)  Sedimentation Rate, Erythrocyte: 140 mm/Hr (10-16-22 @ 12:26)  Sedimentation Rate, Erythrocyte: 142 mm/Hr (10-15-22 @ 09:53)  C-Reactive Protein, Serum: 141.8 mg/L (10-15-22 @ 09:53)      RADIOLOGY & ADDITIONAL TESTS:  I have personally reviewed the last available Chest xray  CXR      CT      CARDIOLOGY TESTING  12 Lead ECG:   Ventricular Rate 86 BPM    Atrial Rate 86 BPM    P-R Interval 170 ms    QRS Duration 98 ms    Q-T Interval 412 ms    QTC Calculation(Bazett) 493 ms    P Axis 19 degrees    R Axis -19 degrees    T Axis 88 degrees    Diagnosis Line Normal sinus rhythm  Possible Anterior infarct , age undetermined  Abnormal ECG    Confirmed by Yusef Isaacs ([id number]) on 10/17/2022 9:10:37 AM (10-15-22 @ 09:56)      MEDICATIONS  aspirin enteric coated 81 Oral daily  atorvastatin 40 Oral at bedtime  darbepoetin Injectable Syringe 25 IV Push <User Schedule>  dextrose 10% Bolus 125 IV Bolus once  dextrose 5%. 1000 IV Continuous <Continuous>  dextrose 5%. 1000 IV Continuous <Continuous>  dextrose 5%. 1000 IV Continuous <Continuous>  dextrose 50% Injectable 25 IV Push once  dextrose 50% Injectable 12.5 IV Push once  dextrose 50% Injectable 25 IV Push once  glucagon  Injectable 1 IntraMuscular once  influenza   Vaccine 0.5 IntraMuscular once  insulin glargine Injectable (LANTUS) 15 SubCutaneous at bedtime  insulin lispro (ADMELOG) corrective regimen sliding scale  SubCutaneous three times a day before meals  meropenem  IVPB 500 IV Intermittent every 24 hours  metoprolol tartrate 50 Oral two times a day  sevelamer carbonate 800 Oral three times a day      WEIGHT  Weight (kg): 90.1 (10-20-22 @ 02:04)  Creatinine, Serum: 4.2 mg/dL (10-19-22 @ 20:50)  Creatinine, Serum: 5.7 mg/dL (10-19-22 @ 09:20)      ANTIBIOTICS:  meropenem  IVPB 500 milliGRAM(s) IV Intermittent every 24 hours      All available historical records have been reviewed

## 2022-10-20 NOTE — PROGRESS NOTE ADULT - SUBJECTIVE AND OBJECTIVE BOX
SCHWABACHER, LAWRENCE  62y  [facility] F3-4B 014 B      Patient is a 62y old  Male who presents with a chief complaint of Sepsis (19 Oct 2022 14:02)      INTERVAL HPI/OVERNIGHT EVENTS:  Patient feels well. he was upset about not getting the angiogram but he will be going today . no other complains.       REVIEW OF SYSTEMS:        FAMILY HISTORY:  Family history of heart disease (Father)    DM (diabetes mellitus) (Mother)    ESRD (end stage renal disease) on dialysis (Mother)      T(C): 35.8 (10-19-22 @ 12:58), Max: 36.3 (10-18-22 @ 21:14)  HR: 78 (10-19-22 @ 12:58) (69 - 79)  BP: 134/60 (10-19-22 @ 12:58) (134/60 - 167/86)  RR: 18 (10-19-22 @ 12:58) (18 - 18)  SpO2: 99% (10-19-22 @ 12:58) (99% - 99%)  Wt(kg): --Vital Signs Last 24 Hrs  T(C): 35.8 (19 Oct 2022 12:58), Max: 36.3 (18 Oct 2022 21:14)  T(F): 96.5 (19 Oct 2022 12:58), Max: 97.3 (18 Oct 2022 21:14)  HR: 78 (19 Oct 2022 12:58) (69 - 79)  BP: 134/60 (19 Oct 2022 12:58) (134/60 - 167/86)  BP(mean): --  RR: 18 (19 Oct 2022 12:58) (18 - 18)  SpO2: 99% (19 Oct 2022 12:58) (99% - 99%)    Parameters below as of 19 Oct 2022 12:58  Patient On (Oxygen Delivery Method): room air        PHYSICAL EXAM:  GENERAL: NAD, well-groomed, well-developed  PULM: Clear to auscultation bilaterally  CARDIAC: Regular rate and rhythm;  GI: Soft, Nontender, Nondistended; Bowel sounds present  EXTREMITIES:  left foot swollen and wrapped     Consultant(s) Notes Reviewed:  [x ] YES  [ ] NO  Care Discussed with Consultants/Other Providers [ x] YES  [ ] NO    LABS:                            7.4    11.48 )-----------( 389      ( 19 Oct 2022 09:20 )             22.7   10-19    133<L>  |  97<L>  |  53<H>  ----------------------------<  91  4.9   |  25  |  5.7<HH>    Ca    8.0<L>      19 Oct 2022 09:20  Phos  3.0     10-18  Mg     2.0     10-19    TPro  6.2  /  Alb  2.3<L>  /  TBili  0.2  /  DBili  x   /  AST  12  /  ALT  6   /  AlkPhos  143<H>  10-19            Culture - Abscess with Gram Stain (collected 16 Oct 2022 17:50)  Source: .Abscess left foot wound  Preliminary Report (18 Oct 2022 19:12):    Rare Morganella morganii    Rare Enterobacter cloacae complex    Rare Proteus vulgaris group    Rare Enterococcus faecium (vancomycin resistant)  Organism: Morganella morganii  Enterobacter cloacae complex  Proteus vulgaris group  Enterococcus faecium (vancomycin resistant) (18 Oct 2022 19:11)  Organism: Enterococcus faecium (vancomycin resistant) (18 Oct 2022 19:11)  Organism: Proteus vulgaris group (18 Oct 2022 19:05)  Organism: Enterobacter cloacae complex (18 Oct 2022 19:05)  Organism: Morganella morganii (18 Oct 2022 19:05)      acetaminophen     Tablet .. 650 milliGRAM(s) Oral every 6 hours PRN  aluminum hydroxide/magnesium hydroxide/simethicone Suspension 30 milliLiter(s) Oral every 4 hours PRN  aspirin enteric coated 81 milliGRAM(s) Oral daily  atorvastatin 40 milliGRAM(s) Oral at bedtime  darbepoetin Injectable Syringe 25 MICROGram(s) IV Push <User Schedule>  dextrose 10% Bolus 125 milliLiter(s) IV Bolus once  dextrose 5%. 1000 milliLiter(s) IV Continuous <Continuous>  dextrose 5%. 1000 milliLiter(s) IV Continuous <Continuous>  dextrose 5%. 1000 milliLiter(s) IV Continuous <Continuous>  dextrose 50% Injectable 25 Gram(s) IV Push once  dextrose 50% Injectable 12.5 Gram(s) IV Push once  dextrose 50% Injectable 25 Gram(s) IV Push once  dextrose Oral Gel 15 Gram(s) Oral once PRN  glucagon  Injectable 1 milliGRAM(s) IntraMuscular once  influenza   Vaccine 0.5 milliLiter(s) IntraMuscular once  insulin glargine Injectable (LANTUS) 15 Unit(s) SubCutaneous at bedtime  insulin lispro (ADMELOG) corrective regimen sliding scale   SubCutaneous three times a day before meals  melatonin 3 milliGRAM(s) Oral at bedtime PRN  meropenem  IVPB 500 milliGRAM(s) IV Intermittent every 24 hours  metoprolol tartrate 50 milliGRAM(s) Oral two times a day  ondansetron Injectable 4 milliGRAM(s) IV Push every 8 hours PRN  sevelamer carbonate 800 milliGRAM(s) Oral three times a day    61 y/o with a PMHx of DM, HTN, ESRD (on dialysis M/W/F, last session on 10/14), CAD s/p 1 stent on 2008 and quadruple CABG on 2012, PAD s/p bilateral angioplasty at [facility], was transferred from [facility] complaining of weakness and unhealed wound in the left foot.    1. 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 hx of PAD   - Podiatry following - must be off plavix for 5 days - last dose on 10/16  - Cont meropenem 500mg IV daily   - IR for angiogram:pending   - Blood cx:NTD   - MRI L foot done:  a) Osteomyelitis and erosion of the calcaneus with a vertical pathologic fracture extending to the subtalar joint. Gangrene of overlying subcutaneous tissues. 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  - ID consult:  a)  continue meropenem 500 mg q 24 hours   b) please check [facility] for susceptibility report (specifically for Gentamicin and Amikacin) to check as option for post HD antibiotics   c) will likely need at least 6 weeks for calcaneal OM   d)  podiatry on-board - will follow any surgical plans       2. ESRD on HD  - HD as per nephrology   - renal following    3. CAD s/p stent and CABG   - on statin   - Discuss with cardio. we're unable to hold aspirin. Cont aspirin (update IR and Podiatry)   - holding plavix since 10/16  - continue BB  - Cardio consult appreciated. at moderate risk for surgery     4. DM II  - hypoglycemic this am  - Lantus was decreased to 15 units tonight         Case Discussed with House Staff   30 minutes spent on total encounter; more than 50% of the visit was spent counseling and/or coordinating care by the attending physician.   Spectra [phone]     SCHWABACHER, LAWRENCE  62y  [facility] F3-4B 014 B      Patient is a 62y old  Male who presents with a chief complaint of Sepsis (19 Oct 2022 14:02)      INTERVAL HPI/OVERNIGHT EVENTS:  Patient feels well. he was upset about not getting the angiogram but he will be going today . no other complains.       REVIEW OF SYSTEMS:        FAMILY HISTORY:  Family history of heart disease (Father)    DM (diabetes mellitus) (Mother)    ESRD (end stage renal disease) on dialysis (Mother)      T(C): 35.8 (10-19-22 @ 12:58), Max: 36.3 (10-18-22 @ 21:14)  HR: 78 (10-19-22 @ 12:58) (69 - 79)  BP: 134/60 (10-19-22 @ 12:58) (134/60 - 167/86)  RR: 18 (10-19-22 @ 12:58) (18 - 18)  SpO2: 99% (10-19-22 @ 12:58) (99% - 99%)  Wt(kg): --Vital Signs Last 24 Hrs  T(C): 35.8 (19 Oct 2022 12:58), Max: 36.3 (18 Oct 2022 21:14)  T(F): 96.5 (19 Oct 2022 12:58), Max: 97.3 (18 Oct 2022 21:14)  HR: 78 (19 Oct 2022 12:58) (69 - 79)  BP: 134/60 (19 Oct 2022 12:58) (134/60 - 167/86)  BP(mean): --  RR: 18 (19 Oct 2022 12:58) (18 - 18)  SpO2: 99% (19 Oct 2022 12:58) (99% - 99%)    Parameters below as of 19 Oct 2022 12:58  Patient On (Oxygen Delivery Method): room air        PHYSICAL EXAM:  GENERAL: NAD, well-groomed, well-developed  PULM: Clear to auscultation bilaterally  CARDIAC: Regular rate and rhythm;  GI: Soft, Nontender, Nondistended; Bowel sounds present  EXTREMITIES:  left foot swollen and wrapped     Consultant(s) Notes Reviewed:  [x ] YES  [ ] NO  Care Discussed with Consultants/Other Providers [ x] YES  [ ] NO    LABS:                            7.4    11.48 )-----------( 389      ( 19 Oct 2022 09:20 )             22.7   10-19    133<L>  |  97<L>  |  53<H>  ----------------------------<  91  4.9   |  25  |  5.7<HH>    Ca    8.0<L>      19 Oct 2022 09:20  Phos  3.0     10-18  Mg     2.0     10-19    TPro  6.2  /  Alb  2.3<L>  /  TBili  0.2  /  DBili  x   /  AST  12  /  ALT  6   /  AlkPhos  143<H>  10-19            Culture - Abscess with Gram Stain (collected 16 Oct 2022 17:50)  Source: .Abscess left foot wound  Preliminary Report (18 Oct 2022 19:12):    Rare Morganella morganii    Rare Enterobacter cloacae complex    Rare Proteus vulgaris group    Rare Enterococcus faecium (vancomycin resistant)  Organism: Morganella morganii  Enterobacter cloacae complex  Proteus vulgaris group  Enterococcus faecium (vancomycin resistant) (18 Oct 2022 19:11)  Organism: Enterococcus faecium (vancomycin resistant) (18 Oct 2022 19:11)  Organism: Proteus vulgaris group (18 Oct 2022 19:05)  Organism: Enterobacter cloacae complex (18 Oct 2022 19:05)  Organism: Morganella morganii (18 Oct 2022 19:05)      acetaminophen     Tablet .. 650 milliGRAM(s) Oral every 6 hours PRN  aluminum hydroxide/magnesium hydroxide/simethicone Suspension 30 milliLiter(s) Oral every 4 hours PRN  aspirin enteric coated 81 milliGRAM(s) Oral daily  atorvastatin 40 milliGRAM(s) Oral at bedtime  darbepoetin Injectable Syringe 25 MICROGram(s) IV Push <User Schedule>  dextrose 10% Bolus 125 milliLiter(s) IV Bolus once  dextrose 5%. 1000 milliLiter(s) IV Continuous <Continuous>  dextrose 5%. 1000 milliLiter(s) IV Continuous <Continuous>  dextrose 5%. 1000 milliLiter(s) IV Continuous <Continuous>  dextrose 50% Injectable 25 Gram(s) IV Push once  dextrose 50% Injectable 12.5 Gram(s) IV Push once  dextrose 50% Injectable 25 Gram(s) IV Push once  dextrose Oral Gel 15 Gram(s) Oral once PRN  glucagon  Injectable 1 milliGRAM(s) IntraMuscular once  influenza   Vaccine 0.5 milliLiter(s) IntraMuscular once  insulin glargine Injectable (LANTUS) 15 Unit(s) SubCutaneous at bedtime  insulin lispro (ADMELOG) corrective regimen sliding scale   SubCutaneous three times a day before meals  melatonin 3 milliGRAM(s) Oral at bedtime PRN  meropenem  IVPB 500 milliGRAM(s) IV Intermittent every 24 hours  metoprolol tartrate 50 milliGRAM(s) Oral two times a day  ondansetron Injectable 4 milliGRAM(s) IV Push every 8 hours PRN  sevelamer carbonate 800 milliGRAM(s) Oral three times a day    61 y/o with a PMHx of DM, HTN, ESRD (on dialysis M/W/F, last session on 10/14), CAD s/p 1 stent on 2008 and quadruple CABG on 2012, PAD s/p bilateral angioplasty at [facility], was transferred from [facility] complaining of weakness and unhealed wound in the left foot.    1. 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 hx of PAD   - Podiatry following - must be off plavix for 5 days - last dose on 10/16  - Cont meropenem 500mg IV daily   - IR for angiogram:  a) Left lower extremity angiogram demonstrating chronically occluded PT and AT with patent anterior tibial vein bypass reconstituting into the DP.   b) No microvascular outflow demonstrates within the calcaneous. No hemodynamically significant stenosis within the left SFA and popliteal artery. No intervention performed  - Blood cx:NTD   - MRI L foot done:  a) Osteomyelitis and erosion of the calcaneus with a vertical pathologic fracture extending to the subtalar joint. Gangrene of overlying subcutaneous tissues. 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  - ID consult:  a)  continue meropenem 500 mg q 24 hours   b) please check [facility] for susceptibility report (specifically for Gentamicin and Amikacin) to check as option for post HD antibiotics   c) will likely need at least 6 weeks for calcaneal OM   d)  podiatry on-board - will follow any surgical plans       2. ESRD on HD  - HD as per nephrology   - renal following    3. CAD s/p stent and CABG   - on statin   - Discuss with cardio. we're unable to hold aspirin. Cont aspirin (update IR and Podiatry)   - holding plavix since 10/16  - continue BB  - Cardio consult appreciated. at moderate risk for surgery     4. DM II  - hypoglycemic this am  - Lantus was decreased to 15 units tonight         Case Discussed with House Staff   30 minutes spent on total encounter; more than 50% of the visit was spent counseling and/or coordinating care by the attending physician.   Spectra [phone]

## 2022-10-21 ENCOUNTER — RESULT REVIEW (OUTPATIENT)
Age: 62
End: 2022-10-21

## 2022-10-21 ENCOUNTER — TRANSCRIPTION ENCOUNTER (OUTPATIENT)
Age: 62
End: 2022-10-21

## 2022-10-21 LAB
ALBUMIN SERPL ELPH-MCNC: 2.5 G/DL — LOW (ref 3.5–5.2)
ALP SERPL-CCNC: 155 U/L — HIGH (ref 30–115)
ALT FLD-CCNC: 7 U/L — SIGNIFICANT CHANGE UP (ref 0–41)
ANION GAP SERPL CALC-SCNC: 12 MMOL/L — SIGNIFICANT CHANGE UP (ref 7–14)
ANION GAP SERPL CALC-SCNC: 14 MMOL/L — SIGNIFICANT CHANGE UP (ref 7–14)
APTT BLD: 32.2 SEC — SIGNIFICANT CHANGE UP (ref 27–39.2)
AST SERPL-CCNC: 16 U/L — SIGNIFICANT CHANGE UP (ref 0–41)
BASOPHILS # BLD AUTO: 0.05 K/UL — SIGNIFICANT CHANGE UP (ref 0–0.2)
BASOPHILS NFR BLD AUTO: 0.5 % — SIGNIFICANT CHANGE UP (ref 0–1)
BILIRUB SERPL-MCNC: 0.3 MG/DL — SIGNIFICANT CHANGE UP (ref 0.2–1.2)
BUN SERPL-MCNC: 47 MG/DL — HIGH (ref 10–20)
BUN SERPL-MCNC: 47 MG/DL — HIGH (ref 10–20)
CALCIUM SERPL-MCNC: 7.8 MG/DL — LOW (ref 8.4–10.5)
CALCIUM SERPL-MCNC: 8.2 MG/DL — LOW (ref 8.4–10.5)
CHLORIDE SERPL-SCNC: 95 MMOL/L — LOW (ref 98–110)
CHLORIDE SERPL-SCNC: 96 MMOL/L — LOW (ref 98–110)
CO2 SERPL-SCNC: 24 MMOL/L — SIGNIFICANT CHANGE UP (ref 17–32)
CO2 SERPL-SCNC: 27 MMOL/L — SIGNIFICANT CHANGE UP (ref 17–32)
CREAT SERPL-MCNC: 5.8 MG/DL — CRITICAL HIGH (ref 0.7–1.5)
CREAT SERPL-MCNC: 5.9 MG/DL — CRITICAL HIGH (ref 0.7–1.5)
CULTURE RESULTS: SIGNIFICANT CHANGE UP
EGFR: 10 ML/MIN/1.73M2 — LOW
EGFR: 10 ML/MIN/1.73M2 — LOW
EOSINOPHIL # BLD AUTO: 0.18 K/UL — SIGNIFICANT CHANGE UP (ref 0–0.7)
EOSINOPHIL NFR BLD AUTO: 1.7 % — SIGNIFICANT CHANGE UP (ref 0–8)
GLUCOSE BLDC GLUCOMTR-MCNC: 150 MG/DL — HIGH (ref 70–99)
GLUCOSE BLDC GLUCOMTR-MCNC: 354 MG/DL — HIGH (ref 70–99)
GLUCOSE BLDC GLUCOMTR-MCNC: 78 MG/DL — SIGNIFICANT CHANGE UP (ref 70–99)
GLUCOSE BLDC GLUCOMTR-MCNC: 82 MG/DL — SIGNIFICANT CHANGE UP (ref 70–99)
GLUCOSE BLDC GLUCOMTR-MCNC: 90 MG/DL — SIGNIFICANT CHANGE UP (ref 70–99)
GLUCOSE SERPL-MCNC: 60 MG/DL — LOW (ref 70–99)
GLUCOSE SERPL-MCNC: 66 MG/DL — LOW (ref 70–99)
HCT VFR BLD CALC: 21.9 % — LOW (ref 42–52)
HCT VFR BLD CALC: 25.7 % — LOW (ref 42–52)
HGB BLD-MCNC: 7.1 G/DL — LOW (ref 14–18)
HGB BLD-MCNC: 8.2 G/DL — LOW (ref 14–18)
IMM GRANULOCYTES NFR BLD AUTO: 0.5 % — HIGH (ref 0.1–0.3)
INR BLD: 1.1 RATIO — SIGNIFICANT CHANGE UP (ref 0.65–1.3)
IRON SATN MFR SERPL: 29 % — SIGNIFICANT CHANGE UP (ref 15–50)
IRON SATN MFR SERPL: 39 UG/DL — SIGNIFICANT CHANGE UP (ref 35–150)
LYMPHOCYTES # BLD AUTO: 0.77 K/UL — LOW (ref 1.2–3.4)
LYMPHOCYTES # BLD AUTO: 7.4 % — LOW (ref 20.5–51.1)
MAGNESIUM SERPL-MCNC: 2 MG/DL — SIGNIFICANT CHANGE UP (ref 1.8–2.4)
MCHC RBC-ENTMCNC: 31.8 PG — HIGH (ref 27–31)
MCHC RBC-ENTMCNC: 31.8 PG — HIGH (ref 27–31)
MCHC RBC-ENTMCNC: 31.9 G/DL — LOW (ref 32–37)
MCHC RBC-ENTMCNC: 32.4 G/DL — SIGNIFICANT CHANGE UP (ref 32–37)
MCV RBC AUTO: 98.2 FL — HIGH (ref 80–94)
MCV RBC AUTO: 99.6 FL — HIGH (ref 80–94)
MONOCYTES # BLD AUTO: 0.7 K/UL — HIGH (ref 0.1–0.6)
MONOCYTES NFR BLD AUTO: 6.8 % — SIGNIFICANT CHANGE UP (ref 1.7–9.3)
NEUTROPHILS # BLD AUTO: 8.59 K/UL — HIGH (ref 1.4–6.5)
NEUTROPHILS NFR BLD AUTO: 83.1 % — HIGH (ref 42.2–75.2)
NRBC # BLD: 0 /100 WBCS — SIGNIFICANT CHANGE UP (ref 0–0)
NRBC # BLD: 0 /100 WBCS — SIGNIFICANT CHANGE UP (ref 0–0)
ORGANISM # SPEC MICROSCOPIC CNT: SIGNIFICANT CHANGE UP
PHOSPHATE SERPL-MCNC: 4.5 MG/DL — SIGNIFICANT CHANGE UP (ref 2.1–4.9)
PLATELET # BLD AUTO: 357 K/UL — SIGNIFICANT CHANGE UP (ref 130–400)
PLATELET # BLD AUTO: 360 K/UL — SIGNIFICANT CHANGE UP (ref 130–400)
POTASSIUM SERPL-MCNC: 4.6 MMOL/L — SIGNIFICANT CHANGE UP (ref 3.5–5)
POTASSIUM SERPL-MCNC: 4.7 MMOL/L — SIGNIFICANT CHANGE UP (ref 3.5–5)
POTASSIUM SERPL-SCNC: 4.6 MMOL/L — SIGNIFICANT CHANGE UP (ref 3.5–5)
POTASSIUM SERPL-SCNC: 4.7 MMOL/L — SIGNIFICANT CHANGE UP (ref 3.5–5)
PROT SERPL-MCNC: 6.7 G/DL — SIGNIFICANT CHANGE UP (ref 6–8)
PROTHROM AB SERPL-ACNC: 12.6 SEC — SIGNIFICANT CHANGE UP (ref 9.95–12.87)
RBC # BLD: 2.23 M/UL — LOW (ref 4.7–6.1)
RBC # BLD: 2.58 M/UL — LOW (ref 4.7–6.1)
RBC # FLD: 15.6 % — HIGH (ref 11.5–14.5)
RBC # FLD: 15.6 % — HIGH (ref 11.5–14.5)
SODIUM SERPL-SCNC: 134 MMOL/L — LOW (ref 135–146)
SODIUM SERPL-SCNC: 134 MMOL/L — LOW (ref 135–146)
SPECIMEN SOURCE: SIGNIFICANT CHANGE UP
TIBC SERPL-MCNC: 133 UG/DL — LOW (ref 220–430)
UIBC SERPL-MCNC: 94 UG/DL — LOW (ref 110–370)
WBC # BLD: 10.34 K/UL — SIGNIFICANT CHANGE UP (ref 4.8–10.8)
WBC # BLD: 10.78 K/UL — SIGNIFICANT CHANGE UP (ref 4.8–10.8)
WBC # FLD AUTO: 10.34 K/UL — SIGNIFICANT CHANGE UP (ref 4.8–10.8)
WBC # FLD AUTO: 10.78 K/UL — SIGNIFICANT CHANGE UP (ref 4.8–10.8)

## 2022-10-21 PROCEDURE — 88311 DECALCIFY TISSUE: CPT | Mod: 26

## 2022-10-21 PROCEDURE — 28003 TREATMENT OF FOOT INFECTION: CPT | Mod: 59,LT

## 2022-10-21 PROCEDURE — 20240 BONE BIOPSY OPEN SUPERFICIAL: CPT | Mod: 59

## 2022-10-21 PROCEDURE — 99232 SBSQ HOSP IP/OBS MODERATE 35: CPT

## 2022-10-21 PROCEDURE — 11043 DBRDMT MUSC&/FSCA 1ST 20/<: CPT | Mod: 59

## 2022-10-21 PROCEDURE — 28120 PART REMOVAL OF ANKLE/HEEL: CPT | Mod: LT

## 2022-10-21 PROCEDURE — 88304 TISSUE EXAM BY PATHOLOGIST: CPT | Mod: 26

## 2022-10-21 RX ORDER — SEVELAMER CARBONATE 2400 MG/1
800 POWDER, FOR SUSPENSION ORAL THREE TIMES A DAY
Refills: 0 | Status: DISCONTINUED | OUTPATIENT
Start: 2022-10-21 | End: 2022-10-24

## 2022-10-21 RX ORDER — INSULIN GLARGINE 100 [IU]/ML
15 INJECTION, SOLUTION SUBCUTANEOUS AT BEDTIME
Refills: 0 | Status: DISCONTINUED | OUTPATIENT
Start: 2022-10-21 | End: 2022-10-23

## 2022-10-21 RX ORDER — LANOLIN ALCOHOL/MO/W.PET/CERES
3 CREAM (GRAM) TOPICAL AT BEDTIME
Refills: 0 | Status: DISCONTINUED | OUTPATIENT
Start: 2022-10-21 | End: 2022-10-24

## 2022-10-21 RX ORDER — INSULIN LISPRO 100/ML
VIAL (ML) SUBCUTANEOUS
Refills: 0 | Status: DISCONTINUED | OUTPATIENT
Start: 2022-10-21 | End: 2022-10-24

## 2022-10-21 RX ORDER — SODIUM CHLORIDE 9 MG/ML
1000 INJECTION, SOLUTION INTRAVENOUS
Refills: 0 | Status: DISCONTINUED | OUTPATIENT
Start: 2022-10-21 | End: 2022-10-21

## 2022-10-21 RX ORDER — DARBEPOETIN ALFA IN POLYSORBAT 200MCG/0.4
25 PEN INJECTOR (ML) SUBCUTANEOUS
Refills: 0 | Status: DISCONTINUED | OUTPATIENT
Start: 2022-10-21 | End: 2022-10-24

## 2022-10-21 RX ORDER — ACETAMINOPHEN 500 MG
650 TABLET ORAL EVERY 6 HOURS
Refills: 0 | Status: DISCONTINUED | OUTPATIENT
Start: 2022-10-21 | End: 2022-10-24

## 2022-10-21 RX ORDER — GLUCAGON INJECTION, SOLUTION 0.5 MG/.1ML
1 INJECTION, SOLUTION SUBCUTANEOUS ONCE
Refills: 0 | Status: DISCONTINUED | OUTPATIENT
Start: 2022-10-21 | End: 2022-10-24

## 2022-10-21 RX ORDER — METOPROLOL TARTRATE 50 MG
50 TABLET ORAL
Refills: 0 | Status: DISCONTINUED | OUTPATIENT
Start: 2022-10-21 | End: 2022-10-24

## 2022-10-21 RX ORDER — ONDANSETRON 8 MG/1
4 TABLET, FILM COATED ORAL EVERY 8 HOURS
Refills: 0 | Status: DISCONTINUED | OUTPATIENT
Start: 2022-10-21 | End: 2022-10-24

## 2022-10-21 RX ORDER — HEPARIN SODIUM 5000 [USP'U]/ML
5000 INJECTION INTRAVENOUS; SUBCUTANEOUS EVERY 12 HOURS
Refills: 0 | Status: DISCONTINUED | OUTPATIENT
Start: 2022-10-21 | End: 2022-10-24

## 2022-10-21 RX ORDER — ATORVASTATIN CALCIUM 80 MG/1
40 TABLET, FILM COATED ORAL AT BEDTIME
Refills: 0 | Status: DISCONTINUED | OUTPATIENT
Start: 2022-10-21 | End: 2022-10-24

## 2022-10-21 RX ORDER — ASPIRIN/CALCIUM CARB/MAGNESIUM 324 MG
81 TABLET ORAL DAILY
Refills: 0 | Status: DISCONTINUED | OUTPATIENT
Start: 2022-10-21 | End: 2022-10-24

## 2022-10-21 RX ORDER — MEROPENEM 1 G/30ML
500 INJECTION INTRAVENOUS EVERY 24 HOURS
Refills: 0 | Status: DISCONTINUED | OUTPATIENT
Start: 2022-10-21 | End: 2022-10-24

## 2022-10-21 RX ORDER — POLYETHYLENE GLYCOL 3350 17 G/17G
17 POWDER, FOR SOLUTION ORAL DAILY
Refills: 0 | Status: DISCONTINUED | OUTPATIENT
Start: 2022-10-21 | End: 2022-10-24

## 2022-10-21 RX ORDER — ONDANSETRON 8 MG/1
4 TABLET, FILM COATED ORAL ONCE
Refills: 0 | Status: DISCONTINUED | OUTPATIENT
Start: 2022-10-21 | End: 2022-10-21

## 2022-10-21 RX ORDER — HYDROMORPHONE HYDROCHLORIDE 2 MG/ML
0.5 INJECTION INTRAMUSCULAR; INTRAVENOUS; SUBCUTANEOUS
Refills: 0 | Status: DISCONTINUED | OUTPATIENT
Start: 2022-10-21 | End: 2022-10-21

## 2022-10-21 RX ADMIN — SEVELAMER CARBONATE 800 MILLIGRAM(S): 2400 POWDER, FOR SUSPENSION ORAL at 21:33

## 2022-10-21 RX ADMIN — Medication 50 MILLIGRAM(S): at 17:17

## 2022-10-21 RX ADMIN — Medication 50 MILLIGRAM(S): at 05:44

## 2022-10-21 RX ADMIN — HEPARIN SODIUM 5000 UNIT(S): 5000 INJECTION INTRAVENOUS; SUBCUTANEOUS at 17:18

## 2022-10-21 RX ADMIN — MEROPENEM 100 MILLIGRAM(S): 1 INJECTION INTRAVENOUS at 05:44

## 2022-10-21 RX ADMIN — INSULIN GLARGINE 15 UNIT(S): 100 INJECTION, SOLUTION SUBCUTANEOUS at 21:40

## 2022-10-21 RX ADMIN — SEVELAMER CARBONATE 800 MILLIGRAM(S): 2400 POWDER, FOR SUSPENSION ORAL at 05:44

## 2022-10-21 RX ADMIN — ATORVASTATIN CALCIUM 40 MILLIGRAM(S): 80 TABLET, FILM COATED ORAL at 21:33

## 2022-10-21 NOTE — PROGRESS NOTE ADULT - ASSESSMENT
61 y/o with a PMHx of DM, HTN, ESRD (on dialysis M/W/F, last session on 10/14), CAD s/p 1 stent on 2008 and quadruple CABG on 2012, PAD s/p bilateral angioplasty at [facility], was transferred from [facility] complaining of weakness and unhealed wound in the left foot. Patient was evaluated by vascular surgery and podiatry at [facility], who recommended debriding the L foot wound. Patient requested to be transferred to [facility] where his vascular surgeons are.  # HD today 3h opti 160 UF 3l   # seen by ID on meropenem   # podiatry  and vascular notes appreciated / angio 10/20 - chronic occlusion of AT and PT and a patent bypass, no intervention performed  # BP well controlled   #on binders/PH noted decrease sevelamer to one tab po q12h with meals   # check iron stores / cont RAMSEY/ keep Hb >7   # will follow

## 2022-10-21 NOTE — PROGRESS NOTE ADULT - SUBJECTIVE AND OBJECTIVE BOX
Nephrology progress note    Patient is seen and examined, events over the last 24 h noted .  s/p LE angio yesterday  HD this am  Allergies:  No Known Allergies    Hospital Medications:   MEDICATIONS  (STANDING):  aspirin enteric coated 81 milliGRAM(s) Oral daily  atorvastatin 40 milliGRAM(s) Oral at bedtime  darbepoetin Injectable Syringe 25 MICROGram(s) IV Push <User Schedule>  dextrose 10% Bolus 125 milliLiter(s) IV Bolus once  dextrose 5%. 1000 milliLiter(s) (50 mL/Hr) IV Continuous <Continuous>  dextrose 5%. 1000 milliLiter(s) (100 mL/Hr) IV Continuous <Continuous>  dextrose 5%. 1000 milliLiter(s) (50 mL/Hr) IV Continuous <Continuous>  dextrose 50% Injectable 25 Gram(s) IV Push once  dextrose 50% Injectable 12.5 Gram(s) IV Push once  dextrose 50% Injectable 25 Gram(s) IV Push once  glucagon  Injectable 1 milliGRAM(s) IntraMuscular once  influenza   Vaccine 0.5 milliLiter(s) IntraMuscular once  insulin glargine Injectable (LANTUS) 15 Unit(s) SubCutaneous at bedtime  insulin lispro (ADMELOG) corrective regimen sliding scale   SubCutaneous three times a day before meals  meropenem  IVPB 500 milliGRAM(s) IV Intermittent every 24 hours  metoprolol tartrate 50 milliGRAM(s) Oral two times a day  sevelamer carbonate 800 milliGRAM(s) Oral three times a day        VITALS:  T(F): 96.8 (10-21-22 @ 05:23), Max: 97.7 (10-20-22 @ 08:56)  HR: 76 (10-21-22 @ 07:31)  BP: 133/54 (10-21-22 @ 07:31)  RR: 18 (10-21-22 @ 07:31)  SpO2: 99% (10-20-22 @ 20:00)  Wt(kg): --    10-19 @ 07:01  -  10-20 @ 07:00  --------------------------------------------------------  IN: 0 mL / OUT: 3600 mL / NET: -3600 mL    10-20 @ 07:01  -  10-21 @ 07:00  --------------------------------------------------------  IN: 0 mL / OUT: 600 mL / NET: -600 mL      Height (cm): 175.3 (10-21 @ 07:31)  Weight (kg): 90.1 (10-21 @ 07:31)  BMI (kg/m2): 29.3 (10-21 @ 07:31)  BSA (m2): 2.06 (10-21 @ 07:31)    PHYSICAL EXAM:  Constitutional: NAD  HEENT: anicteric sclera  Neck: No JVD  Respiratory: CTAB, no wheezes, rales or rhonchi  Cardiovascular: S1, S2, RRR  Gastrointestinal: BS+, soft, NT/ND  Extremities: No peripheral edema, Lt foot dry dressing  Neurological: A/O x 3  : No CVA tenderness. No schneider.   Skin: No rashes  Vascular Access: Lt AVF    LABS:  10-20    135  |  96<L>  |  38<H>  ----------------------------<  91  4.4   |  29  |  4.9<HH>    Ca    8.0<L>      20 Oct 2022 08:08  Mg     1.9     10-20    TPro  6.2  /  Alb  2.4<L>  /  TBili  0.2  /  DBili      /  AST  12  /  ALT  7   /  AlkPhos  149<H>  10-20                          7.6    10.00 )-----------( 360      ( 20 Oct 2022 08:08 )             23.3       Urine Studies:      RADIOLOGY & ADDITIONAL STUDIES:  < from: Xray Chest 1 View- PORTABLE-Routine (Xray Chest 1 View- PORTABLE-Routine in AM.) (10.18.22 @ 06:26) >    No consolidation, effusion or pneumothorax. Linear density in the right   lung base compatible with atelectasis.    --- End of Report ---    < end of copied text >  · Procedure Findings and Details	Left lower extremity angiogram demonstrating chronically occluded PT and AT with patent anterior tibial vein bypass reconstituting into the DP. No microvascular outflow demonstrates within the calcaneous. No hemodynamically significant stenosis within the left SFA and popliteal artery. No intervention performed    5 Georgian mynx arterial closure device deployed.

## 2022-10-21 NOTE — PROGRESS NOTE ADULT - SUBJECTIVE AND OBJECTIVE BOX
SCHWABACHER, LAWRENCE  62y  [facility] F3-4B 014 B      Patient is a 62y old  Male who presents with a chief complaint of Sepsis (19 Oct 2022 14:02)      INTERVAL HPI/OVERNIGHT EVENTS:  Patient feels well. updated about the surgery findings  he reported constipation   no other complains noted             FAMILY HISTORY:  Family history of heart disease (Father)    DM (diabetes mellitus) (Mother)    ESRD (end stage renal disease) on dialysis (Mother)      T(C): 35.8 (10-19-22 @ 12:58), Max: 36.3 (10-18-22 @ 21:14)  HR: 78 (10-19-22 @ 12:58) (69 - 79)  BP: 134/60 (10-19-22 @ 12:58) (134/60 - 167/86)  RR: 18 (10-19-22 @ 12:58) (18 - 18)  SpO2: 99% (10-19-22 @ 12:58) (99% - 99%)  Wt(kg): --Vital Signs Last 24 Hrs  T(C): 35.8 (19 Oct 2022 12:58), Max: 36.3 (18 Oct 2022 21:14)  T(F): 96.5 (19 Oct 2022 12:58), Max: 97.3 (18 Oct 2022 21:14)  HR: 78 (19 Oct 2022 12:58) (69 - 79)  BP: 134/60 (19 Oct 2022 12:58) (134/60 - 167/86)  BP(mean): --  RR: 18 (19 Oct 2022 12:58) (18 - 18)  SpO2: 99% (19 Oct 2022 12:58) (99% - 99%)    Parameters below as of 19 Oct 2022 12:58  Patient On (Oxygen Delivery Method): room air        PHYSICAL EXAM:  GENERAL: NAD, well-groomed, well-developed  PULM: Clear to auscultation bilaterally  CARDIAC: Regular rate and rhythm;  GI: Soft, Nontender, Nondistended; Bowel sounds present  EXTREMITIES:  left foot swollen and wrapped     Consultant(s) Notes Reviewed:  [x ] YES  [ ] NO  Care Discussed with Consultants/Other Providers [ x] YES  [ ] NO    LABS:                            7.4    11.48 )-----------( 389      ( 19 Oct 2022 09:20 )             22.7   10-19    133<L>  |  97<L>  |  53<H>  ----------------------------<  91  4.9   |  25  |  5.7<HH>    Ca    8.0<L>      19 Oct 2022 09:20  Phos  3.0     10-18  Mg     2.0     10-19    TPro  6.2  /  Alb  2.3<L>  /  TBili  0.2  /  DBili  x   /  AST  12  /  ALT  6   /  AlkPhos  143<H>  10-19            Culture - Abscess with Gram Stain (collected 16 Oct 2022 17:50)  Source: .Abscess left foot wound  Preliminary Report (18 Oct 2022 19:12):    Rare Morganella morganii    Rare Enterobacter cloacae complex    Rare Proteus vulgaris group    Rare Enterococcus faecium (vancomycin resistant)  Organism: Morganella morganii  Enterobacter cloacae complex  Proteus vulgaris group  Enterococcus faecium (vancomycin resistant) (18 Oct 2022 19:11)  Organism: Enterococcus faecium (vancomycin resistant) (18 Oct 2022 19:11)  Organism: Proteus vulgaris group (18 Oct 2022 19:05)  Organism: Enterobacter cloacae complex (18 Oct 2022 19:05)  Organism: Morganella morganii (18 Oct 2022 19:05)      acetaminophen     Tablet .. 650 milliGRAM(s) Oral every 6 hours PRN  aluminum hydroxide/magnesium hydroxide/simethicone Suspension 30 milliLiter(s) Oral every 4 hours PRN  aspirin enteric coated 81 milliGRAM(s) Oral daily  atorvastatin 40 milliGRAM(s) Oral at bedtime  darbepoetin Injectable Syringe 25 MICROGram(s) IV Push <User Schedule>  dextrose 10% Bolus 125 milliLiter(s) IV Bolus once  dextrose 5%. 1000 milliLiter(s) IV Continuous <Continuous>  dextrose 5%. 1000 milliLiter(s) IV Continuous <Continuous>  dextrose 5%. 1000 milliLiter(s) IV Continuous <Continuous>  dextrose 50% Injectable 25 Gram(s) IV Push once  dextrose 50% Injectable 12.5 Gram(s) IV Push once  dextrose 50% Injectable 25 Gram(s) IV Push once  dextrose Oral Gel 15 Gram(s) Oral once PRN  glucagon  Injectable 1 milliGRAM(s) IntraMuscular once  influenza   Vaccine 0.5 milliLiter(s) IntraMuscular once  insulin glargine Injectable (LANTUS) 15 Unit(s) SubCutaneous at bedtime  insulin lispro (ADMELOG) corrective regimen sliding scale   SubCutaneous three times a day before meals  melatonin 3 milliGRAM(s) Oral at bedtime PRN  meropenem  IVPB 500 milliGRAM(s) IV Intermittent every 24 hours  metoprolol tartrate 50 milliGRAM(s) Oral two times a day  ondansetron Injectable 4 milliGRAM(s) IV Push every 8 hours PRN  sevelamer carbonate 800 milliGRAM(s) Oral three times a day    61 y/o with a PMHx of DM, HTN, ESRD (on dialysis M/W/F, last session on 10/14), CAD s/p 1 stent on 2008 and quadruple CABG on 2012, PAD s/p bilateral angioplasty at [facility], was transferred from [facility] complaining of weakness and unhealed wound in the left foot.    1. 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 hx of PAD   - Cont meropenem 500mg IV daily   - IR for angiogram:  a) Left lower extremity angiogram demonstrating chronically occluded PT and AT with patent anterior tibial vein bypass reconstituting into the DP.   b) No microvascular outflow demonstrates within the calcaneous. No hemodynamically significant stenosis within the left SFA and popliteal artery. No intervention performed  - Blood cx:NTD   - MRI L foot done:  a) Osteomyelitis and erosion of the calcaneus with a vertical pathologic fracture extending to the subtalar joint. Gangrene of overlying subcutaneous tissues. 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  - ID consult:  a)  continue meropenem 500 mg q 24 hours   b) please check [facility] for susceptibility report (specifically for Gentamicin and Amikacin) to check as option for post HD antibiotics   c) will likely need at least 6 weeks for calcaneal OM   d)  podiatry on-board - will follow any surgical plans   - Podiatry consult:  a) Pt s/p excisional debridement of soft tissue and bone of left foot today, 10/21;   b) Will need to monitor pt over the weekend for L foot status; likely poor prognosis for limb salvage;  c) Sx scheduled on Mon 10/24 @ 11:00AM; excisional debridement of soft tissue and bone of left foot;  d) Podiatry will follow up w/ pt over the weekend;   e) Request pre-lab optimization and OR stratification prior to sx on Mon 10/24; NPO @ Sun 10/23;      2. ESRD on HD  - HD as per nephrology     3. CAD s/p stent and CABG   - on statin   - Discuss with cardio. we're unable to hold aspirin. Cont aspirin (update IR and Podiatry)   - holding plavix since 10/16  - continue BB  - Cardio consult appreciated. at moderate risk for surgery     4. DM II  - hypoglycemic this am  - Lantus was decreased to 15 units tonight     5.constipation   - Start miralax daily       Case Discussed with House Staff   30 minutes spent on total encounter; more than 50% of the visit was spent counseling and/or coordinating care by the attending physician.   [employer] [phone]

## 2022-10-21 NOTE — CHART NOTE - NSCHARTNOTEFT_GEN_A_CORE
Podiatry;    Pt s/p excisional debridement of soft tissue and bone of left foot today, 10/21;   Will need to monitor pt over the weekend for L foot status; likely poor prognosis for limb salvage;  Sx scheduled on Mon 10/24 @ 11:00AM; excisional debridement of soft tissue and bone of left foot;  Podiatry will follow up w/ pt over the weekend;   Request pre-lab optimization and OR stratification prior to sx on Mon 10/24;   NPO @ [place] 10/23;     Podiatry [phone];

## 2022-10-21 NOTE — PROGRESS NOTE ADULT - ASSESSMENT
· Assessment	   61 y/o with a PMHx of DM, HTN, ESRD (on dialysis M/W/F, last session on 10/14), CAD s/p 1 stent on 2008 and quadruple CABG on 2012, PAD s/p bilateral angioplasty at [facility], was transferred from [facility] complaining of weakness and unhealed wound in the left foot. Scheduled for excisional debridement per podiatry on Friday, 10/21.   10/20 s/p angio with IR, no intervention  10/21 s/p excisional dbridement w/IR, anticipating takeback to OR on monday    #Left Foot wound, likely OM  - OR today 10/21 w/podiatry for excisional debridement  - NPO after MN tonight 10/20  - Patient remains stable, afebrile  - F/u post-op  - Will need medical clearance and risk stratification for OR  - X-ray L foot  - MRI L foot: Osteomyelitis and erosion of the calcaneus with a vertical pathologic fracture extending to the subtalar joint. Gangrene of overlying subcutaneous tissues. 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  - Wound cx from 9/16 positive for Enterobacter cloacae, patient was treated with 2x weeks Post-HD Vanc + cefepime   - ESBL E coli bacteremia from [facility] cultures  - Per ID - c/w IV Meropenem 500mg  - F/u Blood cultures  - F/u labs, has active T&S, coags  - Pain control, monitor for fevers, monitor vitals  - Plavix is held, ASA is continued as per cardio given minimal bleed risk and elevated MI risk off asa  - Monitor for signs of bleed  - F/u surgical margins when back for abx regimen  - Patient to go back to OR monday w/podiatry  - Per podiatry post-op note: Will need to monitor pt over the weekend for L foot status; likely poor prognosis for limb salvage;  Sx scheduled on Mon 10/24 @ 11:00AM; excisional debridement of soft tissue and bone of left foot;  Podiatry will follow up w/ pt over the weekend;     #Leg weakness likely secondary to PAD  - Vascular Sx c/s, will get lower extremity arterial duplex (most recent one in september)  - IR angio 10/20, no intervention     #ESRD  - Nephro following, HD yesterday  - Monitor labs    #CAD  - c/w statin  - Per cardiology, aspirin should not be held given MI risk and will restart asa 10/19   - Holding Plavix currently (held 5 days prior to OR)  - c/w Lopressor 50mg  - c/w Lasix 40mg PO BID  - Cardio c/s pre-op clearance    #DM  - Insulin sliding scale   - Pt takes 36 units of Lispro at home and trulicity 1/weel, decreased PM lantus after hypoglycemic episode 2 days ago this admission    #Vomiting - resolved  - Emesis a few days prior likely 2/2 to eating a big meal late at night, will f/u with patient for n/v control   - Diet will resume today post-op    #MISC  - Diet: DASH  - Activity: Ambulate as tolerated   - DVT PPX: SQ Heparin bid  - Pending: surgical margins, podiatry debridement again Monday 10/24

## 2022-10-21 NOTE — PROGRESS NOTE ADULT - SUBJECTIVE AND OBJECTIVE BOX
LAWRENCE SCHWABACHER 62y Male  MRN#: [id number]   Hospital Day: 7d    SUBJECTIVE  Patient is a 62y old Male who presents with a chief complaint of Sepsis (21 Oct 2022 08:07)  Currently admitted to medicine with the primary diagnosis of     INTERVAL HPI AND OVERNIGHT EVENTS:  Patient was examined and seen at bedside. This morning he is resting comfortably in bed and reports no issues or overnight events. Patient is npo since midnight, going to OR with podiatry today for excisional debridement. Patient had mild, asymptomatic hypoglycemia this AM ~80 after being npo. S/p angio yesterday with IR, no intervention was performed at the time. Having mild foot pain, no fevers. Vital signs stable, no acute events, will f/u podiatry after surgery.    REVIEW OF SYMPTOMS:  Relevant ros per above    OBJECTIVE  PAST MEDICAL & SURGICAL HISTORY  CAD (coronary artery disease)  1 stent 2008    S/P CABG (coronary artery bypass graft)  x4    Diabetes mellitus    Transient ischemic attack (TIA)  2017; 2008    Chronic kidney disease (CKD)  Stage IV    Hypertension    Stented coronary artery  in 2008    Myocardial infarction  2012    PAD (peripheral artery disease)  S/p bypass left leg    HLD (hyperlipidemia)    BPH (benign prostatic hyperplasia)    Pain in left knee  s/p fall    OA (osteoarthritis)    [place] (hard of hearing)    Chronic anemia    S/P CABG (coronary artery bypass graft)  2012    H/O arterial bypass of lower limb  Left Lower Extremity (2016)    History of surgery  Left CEA (2017)  Left Pinkie toe Amputation (2014)  CABG x 4 (2012)  Card cath - stent (2008)      AV fistula  2019  LEFT AV FISTULA      ALLERGIES:  No Known Allergies    MEDICATIONS:  STANDING MEDICATIONS  aspirin enteric coated 81 milliGRAM(s) Oral daily  atorvastatin 40 milliGRAM(s) Oral at bedtime  darbepoetin Injectable Syringe 25 MICROGram(s) IV Push <User Schedule>  glucagon  Injectable 1 milliGRAM(s) IntraMuscular once  influenza   Vaccine 0.5 milliLiter(s) IntraMuscular once  insulin glargine Injectable (LANTUS) 15 Unit(s) SubCutaneous at bedtime  insulin lispro (ADMELOG) corrective regimen sliding scale   SubCutaneous three times a day before meals  lactated ringers. 1000 milliLiter(s) IV Continuous <Continuous>  meropenem  IVPB 500 milliGRAM(s) IV Intermittent every 24 hours  metoprolol tartrate 50 milliGRAM(s) Oral two times a day  sevelamer carbonate 800 milliGRAM(s) Oral three times a day    PRN MEDICATIONS  acetaminophen     Tablet .. 650 milliGRAM(s) Oral every 6 hours PRN  aluminum hydroxide/magnesium hydroxide/simethicone Suspension 30 milliLiter(s) Oral every 4 hours PRN  HYDROmorphone  Injectable 0.5 milliGRAM(s) IV Push every 10 minutes PRN  melatonin 3 milliGRAM(s) Oral at bedtime PRN  ondansetron Injectable 4 milliGRAM(s) IV Push once PRN  ondansetron Injectable 4 milliGRAM(s) IV Push every 8 hours PRN      VITAL SIGNS: Last 24 Hours  T(C): 36.4 (21 Oct 2022 13:15), Max: 36.7 (21 Oct 2022 11:40)  T(F): 97.6 (21 Oct 2022 13:15), Max: 98.1 (21 Oct 2022 11:40)  HR: 79 (21 Oct 2022 13:30) (73 - 84)  BP: 147/64 (21 Oct 2022 13:30) (133/54 - 167/76)  BP(mean): --  RR: 20 (21 Oct 2022 13:30) (12 - 20)  SpO2: 95% (21 Oct 2022 13:30) (94% - 99%)    LABS:                        7.1    10.34 )-----------( 360      ( 21 Oct 2022 10:30 )             21.9     10-21    134<L>  |  95<L>  |  47<H>  ----------------------------<  66<L>  4.6   |  27  |  5.8<HH>    Ca    7.8<L>      21 Oct 2022 10:30  Phos  4.5     10-21  Mg     2.0     10-21    TPro  6.7  /  Alb  2.5<L>  /  TBili  0.3  /  DBili  x   /  AST  16  /  ALT  7   /  AlkPhos  155<H>  10-21    PT/INR - ( 21 Oct 2022 07:57 )   PT: 12.60 sec;   INR: 1.10 ratio         PTT - ( 21 Oct 2022 07:57 )  PTT:32.2 sec        RADIOLOGY:      PHYSICAL EXAM:  CONSTITUTIONAL: No acute distress, well-developed, well-groomed, AAOx3  HEAD: Atraumatic, normocephalic  EYES: EOM intact, conjunctiva and sclera clear, non-injected, anicteric  ENT: Supple, no masses, no thyromegaly, no bruits, no JVD; moist mucous membranes  PULMONARY: Clear to auscultation bilaterally; no wheezes, rales, or rhonchi  CARDIOVASCULAR: Regular rate and rhythm; no murmurs, rubs, or gallops  GASTROINTESTINAL: Soft, non-tender, + increased resonance to percussion, obese habitus; bowel sounds present  MUSCULOSKELETAL: 2+ peripheral pulses; no clubbing, no cyanosis, no edema, surgical wrap in place over L foot, non-tender to palpation  NEUROLOGY: non-focal, moving extremities, normal strength, no facial droop  SKIN: No rashes or lesions; warm and dry

## 2022-10-22 LAB
ALBUMIN SERPL ELPH-MCNC: 2.4 G/DL — LOW (ref 3.5–5.2)
ALP SERPL-CCNC: 161 U/L — HIGH (ref 30–115)
ALT FLD-CCNC: 8 U/L — SIGNIFICANT CHANGE UP (ref 0–41)
ANION GAP SERPL CALC-SCNC: 10 MMOL/L — SIGNIFICANT CHANGE UP (ref 7–14)
AST SERPL-CCNC: 17 U/L — SIGNIFICANT CHANGE UP (ref 0–41)
BILIRUB SERPL-MCNC: 0.3 MG/DL — SIGNIFICANT CHANGE UP (ref 0.2–1.2)
BLD GP AB SCN SERPL QL: SIGNIFICANT CHANGE UP
BUN SERPL-MCNC: 35 MG/DL — HIGH (ref 10–20)
CALCIUM SERPL-MCNC: 8 MG/DL — LOW (ref 8.4–10.5)
CHLORIDE SERPL-SCNC: 99 MMOL/L — SIGNIFICANT CHANGE UP (ref 98–110)
CO2 SERPL-SCNC: 31 MMOL/L — SIGNIFICANT CHANGE UP (ref 17–32)
CREAT SERPL-MCNC: 4.9 MG/DL — CRITICAL HIGH (ref 0.7–1.5)
EGFR: 12 ML/MIN/1.73M2 — LOW
FERRITIN SERPL-MCNC: 1126 NG/ML — HIGH (ref 30–400)
GLUCOSE BLDC GLUCOMTR-MCNC: 130 MG/DL — HIGH (ref 70–99)
GLUCOSE BLDC GLUCOMTR-MCNC: 142 MG/DL — HIGH (ref 70–99)
GLUCOSE BLDC GLUCOMTR-MCNC: 165 MG/DL — HIGH (ref 70–99)
GLUCOSE BLDC GLUCOMTR-MCNC: 180 MG/DL — HIGH (ref 70–99)
GLUCOSE SERPL-MCNC: 134 MG/DL — HIGH (ref 70–99)
GRAM STN FLD: SIGNIFICANT CHANGE UP
HCT VFR BLD CALC: 22.2 % — LOW (ref 42–52)
HGB BLD-MCNC: 7.3 G/DL — LOW (ref 14–18)
MAGNESIUM SERPL-MCNC: 1.9 MG/DL — SIGNIFICANT CHANGE UP (ref 1.8–2.4)
MCHC RBC-ENTMCNC: 31.6 PG — HIGH (ref 27–31)
MCHC RBC-ENTMCNC: 32.9 G/DL — SIGNIFICANT CHANGE UP (ref 32–37)
MCV RBC AUTO: 96.1 FL — HIGH (ref 80–94)
NRBC # BLD: 0 /100 WBCS — SIGNIFICANT CHANGE UP (ref 0–0)
PLATELET # BLD AUTO: 333 K/UL — SIGNIFICANT CHANGE UP (ref 130–400)
POTASSIUM SERPL-MCNC: 4.8 MMOL/L — SIGNIFICANT CHANGE UP (ref 3.5–5)
POTASSIUM SERPL-SCNC: 4.8 MMOL/L — SIGNIFICANT CHANGE UP (ref 3.5–5)
PROT SERPL-MCNC: 6.4 G/DL — SIGNIFICANT CHANGE UP (ref 6–8)
RBC # BLD: 2.31 M/UL — LOW (ref 4.7–6.1)
RBC # FLD: 15.4 % — HIGH (ref 11.5–14.5)
SODIUM SERPL-SCNC: 140 MMOL/L — SIGNIFICANT CHANGE UP (ref 135–146)
SPECIMEN SOURCE: SIGNIFICANT CHANGE UP
WBC # BLD: 9.6 K/UL — SIGNIFICANT CHANGE UP (ref 4.8–10.8)
WBC # FLD AUTO: 9.6 K/UL — SIGNIFICANT CHANGE UP (ref 4.8–10.8)

## 2022-10-22 PROCEDURE — 99222 1ST HOSP IP/OBS MODERATE 55: CPT | Mod: 24

## 2022-10-22 PROCEDURE — 99231 SBSQ HOSP IP/OBS SF/LOW 25: CPT

## 2022-10-22 RX ORDER — SODIUM HYPOCHLORITE 0.125 %
1 SOLUTION, NON-ORAL MISCELLANEOUS DAILY
Refills: 0 | Status: DISCONTINUED | OUTPATIENT
Start: 2022-10-22 | End: 2022-10-24

## 2022-10-22 RX ORDER — SENNA PLUS 8.6 MG/1
1 TABLET ORAL
Refills: 0 | Status: DISCONTINUED | OUTPATIENT
Start: 2022-10-22 | End: 2022-10-24

## 2022-10-22 RX ADMIN — Medication 81 MILLIGRAM(S): at 11:38

## 2022-10-22 RX ADMIN — ATORVASTATIN CALCIUM 40 MILLIGRAM(S): 80 TABLET, FILM COATED ORAL at 21:09

## 2022-10-22 RX ADMIN — Medication 2: at 11:38

## 2022-10-22 RX ADMIN — SEVELAMER CARBONATE 800 MILLIGRAM(S): 2400 POWDER, FOR SUSPENSION ORAL at 21:10

## 2022-10-22 RX ADMIN — Medication 2: at 17:45

## 2022-10-22 RX ADMIN — Medication 50 MILLIGRAM(S): at 05:48

## 2022-10-22 RX ADMIN — Medication 50 MILLIGRAM(S): at 17:44

## 2022-10-22 RX ADMIN — SEVELAMER CARBONATE 800 MILLIGRAM(S): 2400 POWDER, FOR SUSPENSION ORAL at 05:48

## 2022-10-22 RX ADMIN — SENNA PLUS 1 TABLET(S): 8.6 TABLET ORAL at 11:38

## 2022-10-22 RX ADMIN — POLYETHYLENE GLYCOL 3350 17 GRAM(S): 17 POWDER, FOR SOLUTION ORAL at 11:37

## 2022-10-22 RX ADMIN — HEPARIN SODIUM 5000 UNIT(S): 5000 INJECTION INTRAVENOUS; SUBCUTANEOUS at 17:45

## 2022-10-22 RX ADMIN — SEVELAMER CARBONATE 800 MILLIGRAM(S): 2400 POWDER, FOR SUSPENSION ORAL at 13:36

## 2022-10-22 RX ADMIN — HEPARIN SODIUM 5000 UNIT(S): 5000 INJECTION INTRAVENOUS; SUBCUTANEOUS at 05:49

## 2022-10-22 RX ADMIN — INSULIN GLARGINE 15 UNIT(S): 100 INJECTION, SOLUTION SUBCUTANEOUS at 21:09

## 2022-10-22 RX ADMIN — SENNA PLUS 1 TABLET(S): 8.6 TABLET ORAL at 17:44

## 2022-10-22 RX ADMIN — Medication 1 APPLICATION(S): at 11:40

## 2022-10-22 NOTE — PROGRESS NOTE ADULT - SUBJECTIVE AND OBJECTIVE BOX
SCHWABACHER, LAWRENCE  62y  [facility] F3-4B 014 B      Patient is a 62y old  Male who presents with a chief complaint of Sepsis (19 Oct 2022 14:02)      INTERVAL HPI/OVERNIGHT EVENTS:  Patient feels well. updated about the surgery findings  he reported constipation   no other complains noted             FAMILY HISTORY:  Family history of heart disease (Father)    DM (diabetes mellitus) (Mother)    ESRD (end stage renal disease) on dialysis (Mother)      T(C): 35.8 (10-19-22 @ 12:58), Max: 36.3 (10-18-22 @ 21:14)  HR: 78 (10-19-22 @ 12:58) (69 - 79)  BP: 134/60 (10-19-22 @ 12:58) (134/60 - 167/86)  RR: 18 (10-19-22 @ 12:58) (18 - 18)  SpO2: 99% (10-19-22 @ 12:58) (99% - 99%)  Wt(kg): --Vital Signs Last 24 Hrs  T(C): 35.8 (19 Oct 2022 12:58), Max: 36.3 (18 Oct 2022 21:14)  T(F): 96.5 (19 Oct 2022 12:58), Max: 97.3 (18 Oct 2022 21:14)  HR: 78 (19 Oct 2022 12:58) (69 - 79)  BP: 134/60 (19 Oct 2022 12:58) (134/60 - 167/86)  BP(mean): --  RR: 18 (19 Oct 2022 12:58) (18 - 18)  SpO2: 99% (19 Oct 2022 12:58) (99% - 99%)    Parameters below as of 19 Oct 2022 12:58  Patient On (Oxygen Delivery Method): room air        PHYSICAL EXAM:  GENERAL: NAD, well-groomed, well-developed  PULM: Clear to auscultation bilaterally  CARDIAC: Regular rate and rhythm;  GI: Soft, Nontender, Nondistended; Bowel sounds present  EXTREMITIES:  left foot swollen and wrapped     Consultant(s) Notes Reviewed:  [x ] YES  [ ] NO  Care Discussed with Consultants/Other Providers [ x] YES  [ ] NO    LABS:                            7.4    11.48 )-----------( 389      ( 19 Oct 2022 09:20 )             22.7   10-19    133<L>  |  97<L>  |  53<H>  ----------------------------<  91  4.9   |  25  |  5.7<HH>    Ca    8.0<L>      19 Oct 2022 09:20  Phos  3.0     10-18  Mg     2.0     10-19    TPro  6.2  /  Alb  2.3<L>  /  TBili  0.2  /  DBili  x   /  AST  12  /  ALT  6   /  AlkPhos  143<H>  10-19            Culture - Abscess with Gram Stain (collected 16 Oct 2022 17:50)  Source: .Abscess left foot wound  Preliminary Report (18 Oct 2022 19:12):    Rare Morganella morganii    Rare Enterobacter cloacae complex    Rare Proteus vulgaris group    Rare Enterococcus faecium (vancomycin resistant)  Organism: Morganella morganii  Enterobacter cloacae complex  Proteus vulgaris group  Enterococcus faecium (vancomycin resistant) (18 Oct 2022 19:11)  Organism: Enterococcus faecium (vancomycin resistant) (18 Oct 2022 19:11)  Organism: Proteus vulgaris group (18 Oct 2022 19:05)  Organism: Enterobacter cloacae complex (18 Oct 2022 19:05)  Organism: Morganella morganii (18 Oct 2022 19:05)      acetaminophen     Tablet .. 650 milliGRAM(s) Oral every 6 hours PRN  aluminum hydroxide/magnesium hydroxide/simethicone Suspension 30 milliLiter(s) Oral every 4 hours PRN  aspirin enteric coated 81 milliGRAM(s) Oral daily  atorvastatin 40 milliGRAM(s) Oral at bedtime  darbepoetin Injectable Syringe 25 MICROGram(s) IV Push <User Schedule>  dextrose 10% Bolus 125 milliLiter(s) IV Bolus once  dextrose 5%. 1000 milliLiter(s) IV Continuous <Continuous>  dextrose 5%. 1000 milliLiter(s) IV Continuous <Continuous>  dextrose 5%. 1000 milliLiter(s) IV Continuous <Continuous>  dextrose 50% Injectable 25 Gram(s) IV Push once  dextrose 50% Injectable 12.5 Gram(s) IV Push once  dextrose 50% Injectable 25 Gram(s) IV Push once  dextrose Oral Gel 15 Gram(s) Oral once PRN  glucagon  Injectable 1 milliGRAM(s) IntraMuscular once  influenza   Vaccine 0.5 milliLiter(s) IntraMuscular once  insulin glargine Injectable (LANTUS) 15 Unit(s) SubCutaneous at bedtime  insulin lispro (ADMELOG) corrective regimen sliding scale   SubCutaneous three times a day before meals  melatonin 3 milliGRAM(s) Oral at bedtime PRN  meropenem  IVPB 500 milliGRAM(s) IV Intermittent every 24 hours  metoprolol tartrate 50 milliGRAM(s) Oral two times a day  ondansetron Injectable 4 milliGRAM(s) IV Push every 8 hours PRN  sevelamer carbonate 800 milliGRAM(s) Oral three times a day    61 y/o with a PMHx of DM, HTN, ESRD (on dialysis M/W/F, last session on 10/14), CAD s/p 1 stent on 2008 and quadruple CABG on 2012, PAD s/p bilateral angioplasty at [facility], was transferred from [facility] complaining of weakness and unhealed wound in the left foot.    1. 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 hx of PAD   - Cont meropenem 500mg IV daily   - IR for angiogram:  a) Left lower extremity angiogram demonstrating chronically occluded PT and AT with patent anterior tibial vein bypass reconstituting into the DP.   b) No microvascular outflow demonstrates within the calcaneous. No hemodynamically significant stenosis within the left SFA and popliteal artery. No intervention performed  - Blood cx:NTD   - MRI L foot done:  a) Osteomyelitis and erosion of the calcaneus with a vertical pathologic fracture extending to the subtalar joint. Gangrene of overlying subcutaneous tissues. 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  - ID consult appreciated   - Podiatry consult:  a) Pt s/p excisional debridement of soft tissue and bone of left foot today, 10/21;   b) Will need to monitor pt over the weekend for L foot status; likely poor prognosis for limb salvage;  c) Sx scheduled on Mon 10/24 @ 11:00AM; excisional debridement of soft tissue and bone of left foot;  d) Podiatry will follow up w/ pt over the weekend;   e) Request pre-lab optimization and OR stratification prior to sx on Mon 10/24; NPO @ Sun 10/23;      2. ESRD on HD  - HD as per nephrology     3. CAD s/p stent and CABG   - on statin   - Discuss with cardio. we're unable to hold aspirin. Cont aspirin (update IR and Podiatry)   - holding plavix since 10/16  - continue BB  - Cardio consult appreciated. at moderate risk for surgery     4. DM II  - hypoglycemic this am  - Lantus  15 units HS     5.constipation   - Cont miralax daily       Case Discussed with House Staff   30 minutes spent on total encounter; more than 50% of the visit was spent counseling and/or coordinating care by the attending physician.   Spectra [phone]     SCHWABACHER, LAWRENCE  62y  [facility] F3-4B 014 B      Patient is a 62y old  Male who presents with a chief complaint of Sepsis (19 Oct 2022 14:02)      INTERVAL HPI/OVERNIGHT EVENTS:  Patient feels well. understand the plan of care  Patient reported constipation             FAMILY HISTORY:  Family history of heart disease (Father)    DM (diabetes mellitus) (Mother)    ESRD (end stage renal disease) on dialysis (Mother)      T(C): 35.8 (10-19-22 @ 12:58), Max: 36.3 (10-18-22 @ 21:14)  HR: 78 (10-19-22 @ 12:58) (69 - 79)  BP: 134/60 (10-19-22 @ 12:58) (134/60 - 167/86)  RR: 18 (10-19-22 @ 12:58) (18 - 18)  SpO2: 99% (10-19-22 @ 12:58) (99% - 99%)  Wt(kg): --Vital Signs Last 24 Hrs  T(C): 35.8 (19 Oct 2022 12:58), Max: 36.3 (18 Oct 2022 21:14)  T(F): 96.5 (19 Oct 2022 12:58), Max: 97.3 (18 Oct 2022 21:14)  HR: 78 (19 Oct 2022 12:58) (69 - 79)  BP: 134/60 (19 Oct 2022 12:58) (134/60 - 167/86)  BP(mean): --  RR: 18 (19 Oct 2022 12:58) (18 - 18)  SpO2: 99% (19 Oct 2022 12:58) (99% - 99%)    Parameters below as of 19 Oct 2022 12:58  Patient On (Oxygen Delivery Method): room air        PHYSICAL EXAM:  GENERAL: NAD, well-groomed, well-developed  PULM: Clear to auscultation bilaterally  CARDIAC: Regular rate and rhythm;  GI: Soft, Nontender, Nondistended; Bowel sounds present  EXTREMITIES:  left foot swollen and wrapped     Consultant(s) Notes Reviewed:  [x ] YES  [ ] NO  Care Discussed with Consultants/Other Providers [ x] YES  [ ] NO    LABS:                            7.4    11.48 )-----------( 389      ( 19 Oct 2022 09:20 )             22.7   10-19    133<L>  |  97<L>  |  53<H>  ----------------------------<  91  4.9   |  25  |  5.7<HH>    Ca    8.0<L>      19 Oct 2022 09:20  Phos  3.0     10-18  Mg     2.0     10-19    TPro  6.2  /  Alb  2.3<L>  /  TBili  0.2  /  DBili  x   /  AST  12  /  ALT  6   /  AlkPhos  143<H>  10-19            Culture - Abscess with Gram Stain (collected 16 Oct 2022 17:50)  Source: .Abscess left foot wound  Preliminary Report (18 Oct 2022 19:12):    Rare Morganella morganii    Rare Enterobacter cloacae complex    Rare Proteus vulgaris group    Rare Enterococcus faecium (vancomycin resistant)  Organism: Morganella morganii  Enterobacter cloacae complex  Proteus vulgaris group  Enterococcus faecium (vancomycin resistant) (18 Oct 2022 19:11)  Organism: Enterococcus faecium (vancomycin resistant) (18 Oct 2022 19:11)  Organism: Proteus vulgaris group (18 Oct 2022 19:05)  Organism: Enterobacter cloacae complex (18 Oct 2022 19:05)  Organism: Morganella morganii (18 Oct 2022 19:05)      acetaminophen     Tablet .. 650 milliGRAM(s) Oral every 6 hours PRN  aluminum hydroxide/magnesium hydroxide/simethicone Suspension 30 milliLiter(s) Oral every 4 hours PRN  aspirin enteric coated 81 milliGRAM(s) Oral daily  atorvastatin 40 milliGRAM(s) Oral at bedtime  darbepoetin Injectable Syringe 25 MICROGram(s) IV Push <User Schedule>  dextrose 10% Bolus 125 milliLiter(s) IV Bolus once  dextrose 5%. 1000 milliLiter(s) IV Continuous <Continuous>  dextrose 5%. 1000 milliLiter(s) IV Continuous <Continuous>  dextrose 5%. 1000 milliLiter(s) IV Continuous <Continuous>  dextrose 50% Injectable 25 Gram(s) IV Push once  dextrose 50% Injectable 12.5 Gram(s) IV Push once  dextrose 50% Injectable 25 Gram(s) IV Push once  dextrose Oral Gel 15 Gram(s) Oral once PRN  glucagon  Injectable 1 milliGRAM(s) IntraMuscular once  influenza   Vaccine 0.5 milliLiter(s) IntraMuscular once  insulin glargine Injectable (LANTUS) 15 Unit(s) SubCutaneous at bedtime  insulin lispro (ADMELOG) corrective regimen sliding scale   SubCutaneous three times a day before meals  melatonin 3 milliGRAM(s) Oral at bedtime PRN  meropenem  IVPB 500 milliGRAM(s) IV Intermittent every 24 hours  metoprolol tartrate 50 milliGRAM(s) Oral two times a day  ondansetron Injectable 4 milliGRAM(s) IV Push every 8 hours PRN  sevelamer carbonate 800 milliGRAM(s) Oral three times a day    61 y/o with a PMHx of DM, HTN, ESRD (on dialysis M/W/F, last session on 10/14), CAD s/p 1 stent on 2008 and quadruple CABG on 2012, PAD s/p bilateral angioplasty at [facility], was transferred from [facility] complaining of weakness and unhealed wound in the left foot.    1. 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 hx of PAD   - Cont meropenem 500mg IV daily   - IR for angiogram:  a) Left lower extremity angiogram demonstrating chronically occluded PT and AT with patent anterior tibial vein bypass reconstituting into the DP.   b) No microvascular outflow demonstrates within the calcaneous. No hemodynamically significant stenosis within the left SFA and popliteal artery. No intervention performed  - Blood cx:NTD   - MRI L foot done:  a) Osteomyelitis and erosion of the calcaneus with a vertical pathologic fracture extending to the subtalar joint. Gangrene of overlying subcutaneous tissues. 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  - ID consult appreciated   - Podiatry consult:  a) Pt s/p excisional debridement of soft tissue and bone of left foot today, 10/21;   b) Will need to monitor pt over the weekend for L foot status; likely poor prognosis for limb salvage;  c) Sx scheduled on Mon 10/24 @ 11:00AM; excisional debridement of soft tissue and bone of left foot;  d) Podiatry will follow up w/ pt over the weekend;   e) Request pre-lab optimization and OR stratification prior to sx on Mon 10/24; NPO @ Sun 10/23;      2. ESRD on HD  - HD as per nephrology     3. CAD s/p stent and CABG   - on statin   - Discuss with cardio. we're unable to hold aspirin. Cont aspirin (update IR and Podiatry)   - holding plavix since 10/16  - continue BB  - Cardio consult appreciated. at moderate risk for surgery     4. DM II  - hypoglycemic this am  - Lantus  15 units HS     5.constipation   - Cont miralax daily       Case Discussed with House Staff   20 minutes spent on total encounter; more than 50% of the visit was spent counseling and/or coordinating care by the attending physician.   Spectra [phone]

## 2022-10-22 NOTE — PROGRESS NOTE ADULT - ASSESSMENT
61 y/o with a PMHx of DM, HTN, ESRD (on dialysis M/W/F, last session on 10/14), CAD s/p 1 stent on 2008 and quadruple CABG on 2012, PAD s/p bilateral angioplasty at [facility], was transferred from [facility] complaining of weakness and unhealed wound in the left foot. Patient was evaluated by vascular surgery and podiatry at [facility], who recommended debriding the L foot wound. Patient requested to be transferred to [facility] where his vascular surgeons are.  # s/p hd yesterday/ hd on monday   # on meropenem   # podiatry  and vascular notes appreciated / angio 10/20 - chronic occlusion of AT and PT and a patent bypass, no intervention performed s/p OR   # BP well controlled   #on binders/PH noted on binders  #  iron stores noted / elevated ferritin  / cont RAMSEY/ keep Hb >7   # will follow

## 2022-10-22 NOTE — PROGRESS NOTE ADULT - SUBJECTIVE AND OBJECTIVE BOX
seen and examined  no distress   lying comfortable         PAST HISTORY  --------------------------------------------------------------------------------  No significant changes to PMH, PSH, FHx, SHx, unless otherwise noted    ALLERGIES & MEDICATIONS  --------------------------------------------------------------------------------  Allergies    No Known Allergies    Intolerances      Standing Inpatient Medications  aspirin enteric coated 81 milliGRAM(s) Oral daily  atorvastatin 40 milliGRAM(s) Oral at bedtime  darbepoetin Injectable Syringe 25 MICROGram(s) IV Push <User Schedule>  glucagon  Injectable 1 milliGRAM(s) IntraMuscular once  heparin   Injectable 5000 Unit(s) SubCutaneous every 12 hours  influenza   Vaccine 0.5 milliLiter(s) IntraMuscular once  insulin glargine Injectable (LANTUS) 15 Unit(s) SubCutaneous at bedtime  insulin lispro (ADMELOG) corrective regimen sliding scale   SubCutaneous three times a day before meals  meropenem  IVPB 500 milliGRAM(s) IV Intermittent every 24 hours  metoprolol tartrate 50 milliGRAM(s) Oral two times a day  polyethylene glycol 3350 17 Gram(s) Oral daily  sevelamer carbonate 800 milliGRAM(s) Oral three times a day    PRN Inpatient Medications  acetaminophen     Tablet .. 650 milliGRAM(s) Oral every 6 hours PRN  aluminum hydroxide/magnesium hydroxide/simethicone Suspension 30 milliLiter(s) Oral every 4 hours PRN  melatonin 3 milliGRAM(s) Oral at bedtime PRN  ondansetron Injectable 4 milliGRAM(s) IV Push every 8 hours PRN    VITALS/PHYSICAL EXAM  --------------------------------------------------------------------------------  T(C): 36.1 (10-22-22 @ 05:23), Max: 36.7 (10-21-22 @ 11:40)  HR: 85 (10-22-22 @ 05:23) (79 - 85)  BP: 132/60 (10-22-22 @ 05:23) (132/60 - 167/76)  RR: 18 (10-22-22 @ 05:23) (12 - 20)  SpO2: 96% (10-21-22 @ 13:45) (94% - 98%)  Wt(kg): --  Height (cm): 175.3 (10-21-22 @ 07:31)  Weight (kg): 90.1 (10-21-22 @ 07:31)  BMI (kg/m2): 29.3 (10-21-22 @ 07:31)  BSA (m2): 2.06 (10-21-22 @ 07:31)      10-21-22 @ 07:01  -  10-22-22 @ 07:00  --------------------------------------------------------  IN: 0 mL / OUT: 3000 mL / NET: -3000 mL      Physical Exam:  	Gen: NAD  	Pulm: CTA B/L  	CV:  S1S2; no rub  	Abd:  soft, nontender/nondistended  	LE: dressing   	Vascular access:av     LABS/STUDIES  --------------------------------------------------------------------------------              7.1    10.34 >-----------<  360      [10-21-22 @ 10:30]              21.9     134  |  95  |  47  ----------------------------<  66      [10-21-22 @ 10:30]  4.6   |  27  |  5.8        Ca     7.8     [10-21-22 @ 10:30]      Mg     2.0     [10-21-22 @ 07:57]      Phos  4.5     [10-21-22 @ 10:30]    TPro  6.7  /  Alb  2.5  /  TBili  0.3  /  DBili  x   /  AST  16  /  ALT  7   /  AlkPhos  155  [10-21-22 @ 07:57]    PT/INR: PT 12.60, INR 1.10       [10-21-22 @ 07:57]  PTT: 32.2       [10-21-22 @ 07:57]      Creatinine Trend:  SCr 5.8 [10-21 @ 10:30]  SCr 5.9 [10-21 @ 07:57]  SCr 4.9 [10-20 @ 08:08]  SCr 4.2 [10-19 @ 20:50]  SCr 5.7 [10-19 @ 09:20]        Iron 39, TIBC 133, %sat 29      [10-21-22 @ 10:30]  Ferritin 1126      [10-21-22 @ 10:30]  PTH -- (Ca 7.5)      [02-26-22 @ 16:00]   312  Vitamin D (25OH) 21      [02-26-22 @ 16:00]  HbA1c 5.8      [01-30-20 @ 06:46]  Lipid: chol 81, , HDL 22, LDL --      [10-15-22 @ 09:53]

## 2022-10-22 NOTE — PROGRESS NOTE ADULT - SUBJECTIVE AND OBJECTIVE BOX
Podiatry Progress Note    Subjective:  SCHWABACHER, LAWRENCE is a  62y Male.   Seen bedside.   Patient is a 62y old  Male who presents with a chief complaint of Sepsis (22 Oct 2022 08:15)    Podiatry:  Patient seen this morning for wound evaluation and dressing change. Patient is scheduled for surgery on Monday 10/24/22 @ 11:00am excisional debridement of soft tissue and bone of left foot. Dressings were intact with moderate drainage on bandage after removal. Patient denies any pain. Denies f/c/n/v/sob.     Past Medical History and Surgical History  PAST MEDICAL & SURGICAL HISTORY:  CAD (coronary artery disease)  1 stent 2008      S/P CABG (coronary artery bypass graft)  x4      Diabetes mellitus      Transient ischemic attack (TIA)  2017; 2008      Chronic kidney disease (CKD)  Stage IV      Hypertension      Stented coronary artery  in 2008      Myocardial infarction  2012      PAD (peripheral artery disease)  S/p bypass left leg      HLD (hyperlipidemia)      BPH (benign prostatic hyperplasia)      Pain in left knee  s/p fall      OA (osteoarthritis)      [place] (hard of hearing)      Chronic anemia      S/P CABG (coronary artery bypass graft)  2012      H/O arterial bypass of lower limb  Left Lower Extremity (2016)      History of surgery  Left CEA (2017)  Left Pinkie toe Amputation (2014)  CABG x 4 (2012)  Card cath - stent (2008)        AV fistula  2019  LEFT AV FISTULA           Objective:  Vital Signs Last 24 Hrs  T(C): 36.6 (22 Oct 2022 11:27), Max: 36.6 (22 Oct 2022 11:27)  T(F): 97.8 (22 Oct 2022 11:27), Max: 97.8 (22 Oct 2022 11:27)  HR: 85 (22 Oct 2022 11:27) (79 - 85)  BP: 149/69 (22 Oct 2022 11:27) (132/60 - 157/88)  BP(mean): --  RR: 18 (22 Oct 2022 11:27) (12 - 20)  SpO2: 96% (21 Oct 2022 13:45) (94% - 98%)    Parameters below as of 21 Oct 2022 13:45  Patient On (Oxygen Delivery Method): room air                            7.3    9.60  )-----------( 333      ( 22 Oct 2022 09:52 )             22.2                 10-22    140  |  99  |  35<H>  ----------------------------<  134<H>  4.8   |  31  |  4.9<HH>    Ca    8.0<L>      22 Oct 2022 09:52  Phos  4.5     10-21  Mg     1.9     10-22    TPro  6.4  /  Alb  2.4<L>  /  TBili  0.3  /  DBili  x   /  AST  17  /  ALT  8   /  AlkPhos  161<H>  10-22      Culture - Tissue with Gram Stain (collected 10-21-22 @ 14:05)  Source: .Tissue None  Gram Stain (10-22-22 @ 01:55):    No polymorphonuclear leukocytes seen per low power field    No organisms seen per oil power field      #Left Lower Extremity  Derm:   Open Left Plantar Ulcer @ Plantar Medial Rearfoot;    -Wound Base Fibrogranular; 50% Fibrous, 50% Granular; unchanged;    -Wound Probes to Bone w/ Mild Serosanguinous Drainage;    -Wound Margins Irregular  Mild Cellulitis w/ Erythema of Left Lower Extremity;    Vascular: DP and PT Pulses Diminished; Foot is Tepid to Warm to the touch; Capillary Refill Delayed to the Toes;  Neuro: Protective Sensation Diminished / Moderately Neuropathic   MSK: Mild Charcot Deformity Left Foot; No Pain On Palpation at Wound Site     Assessment:  S/p Excisional Debridement of Soft Tissue & Bone, Left Foot; (DOS: 10/21/22)       Plan:  Chart reviewed and Patient evaluated. All Questions and Concerns Addressed and Answered  Local Wound Care; Wound Flushed w/ NS; Wound Packed w/ xeroform/ ABD x2 / DSD / Kerlix / Ace bandage  Weight Bearing Status; WBAT w/ Heel Touch w/ Surgical Shoe;   Continue w/ Local Wound Care; Q24 Dressing Changes;  Surgery scheduled for Monday 10/24/22 @ 11:00am excisional debridement of soft tissue and bone of left foot;   Request Medical Clearance;  Please optimize lab values prior to OR  NPO @ mn 10/23/22  Discussed Plan w/ Attending Dr. Garvin     Podiatry

## 2022-10-23 LAB
-  AMIKACIN: SIGNIFICANT CHANGE UP
-  AMOXICILLIN/CLAVULANIC ACID: SIGNIFICANT CHANGE UP
-  AMOXICILLIN/CLAVULANIC ACID: SIGNIFICANT CHANGE UP
-  AMPICILLIN/SULBACTAM: SIGNIFICANT CHANGE UP
-  AMPICILLIN: SIGNIFICANT CHANGE UP
-  AZTREONAM: SIGNIFICANT CHANGE UP
-  CEFAZOLIN: SIGNIFICANT CHANGE UP
-  CEFEPIME: SIGNIFICANT CHANGE UP
-  CEFOXITIN: SIGNIFICANT CHANGE UP
-  CEFOXITIN: SIGNIFICANT CHANGE UP
-  CEFTRIAXONE: SIGNIFICANT CHANGE UP
-  CIPROFLOXACIN: SIGNIFICANT CHANGE UP
-  CLINDAMYCIN: SIGNIFICANT CHANGE UP
-  DAPTOMYCIN: SIGNIFICANT CHANGE UP
-  DAPTOMYCIN: SIGNIFICANT CHANGE UP
-  ERTAPENEM: SIGNIFICANT CHANGE UP
-  ERTAPENEM: SIGNIFICANT CHANGE UP
-  ERYTHROMYCIN: SIGNIFICANT CHANGE UP
-  GENTAMICIN: SIGNIFICANT CHANGE UP
-  LEVOFLOXACIN: SIGNIFICANT CHANGE UP
-  LINEZOLID: SIGNIFICANT CHANGE UP
-  LINEZOLID: SIGNIFICANT CHANGE UP
-  MEROPENEM: SIGNIFICANT CHANGE UP
-  OXACILLIN: SIGNIFICANT CHANGE UP
-  PENICILLIN: SIGNIFICANT CHANGE UP
-  PIPERACILLIN/TAZOBACTAM: SIGNIFICANT CHANGE UP
-  RIFAMPIN: SIGNIFICANT CHANGE UP
-  TETRACYCLINE: SIGNIFICANT CHANGE UP
-  TOBRAMYCIN: SIGNIFICANT CHANGE UP
-  TRIMETHOPRIM/SULFAMETHOXAZOLE: SIGNIFICANT CHANGE UP
-  VANCOMYCIN: SIGNIFICANT CHANGE UP
ALBUMIN SERPL ELPH-MCNC: 2.6 G/DL — LOW (ref 3.5–5.2)
ALP SERPL-CCNC: 155 U/L — HIGH (ref 30–115)
ALT FLD-CCNC: 8 U/L — SIGNIFICANT CHANGE UP (ref 0–41)
ANION GAP SERPL CALC-SCNC: 13 MMOL/L — SIGNIFICANT CHANGE UP (ref 7–14)
APPEARANCE UR: ABNORMAL
APTT BLD: 32.9 SEC — SIGNIFICANT CHANGE UP (ref 27–39.2)
AST SERPL-CCNC: 17 U/L — SIGNIFICANT CHANGE UP (ref 0–41)
BACTERIA # UR AUTO: NEGATIVE — SIGNIFICANT CHANGE UP
BILIRUB SERPL-MCNC: 0.3 MG/DL — SIGNIFICANT CHANGE UP (ref 0.2–1.2)
BILIRUB UR-MCNC: NEGATIVE — SIGNIFICANT CHANGE UP
BUN SERPL-MCNC: 47 MG/DL — HIGH (ref 10–20)
CALCIUM SERPL-MCNC: 8.2 MG/DL — LOW (ref 8.4–10.5)
CHLORIDE SERPL-SCNC: 96 MMOL/L — LOW (ref 98–110)
CO2 SERPL-SCNC: 28 MMOL/L — SIGNIFICANT CHANGE UP (ref 17–32)
COLOR SPEC: YELLOW — SIGNIFICANT CHANGE UP
CREAT SERPL-MCNC: 6 MG/DL — CRITICAL HIGH (ref 0.7–1.5)
DIFF PNL FLD: ABNORMAL
EGFR: 10 ML/MIN/1.73M2 — LOW
EPI CELLS # UR: 9 /HPF — HIGH (ref 0–5)
GLUCOSE BLDC GLUCOMTR-MCNC: 145 MG/DL — HIGH (ref 70–99)
GLUCOSE BLDC GLUCOMTR-MCNC: 220 MG/DL — HIGH (ref 70–99)
GLUCOSE BLDC GLUCOMTR-MCNC: 263 MG/DL — HIGH (ref 70–99)
GLUCOSE BLDC GLUCOMTR-MCNC: 81 MG/DL — SIGNIFICANT CHANGE UP (ref 70–99)
GLUCOSE SERPL-MCNC: 111 MG/DL — HIGH (ref 70–99)
GLUCOSE UR QL: ABNORMAL
HCT VFR BLD CALC: 22.4 % — LOW (ref 42–52)
HGB BLD-MCNC: 7.4 G/DL — LOW (ref 14–18)
HYALINE CASTS # UR AUTO: 2 /LPF — SIGNIFICANT CHANGE UP (ref 0–7)
INR BLD: 1.1 RATIO — SIGNIFICANT CHANGE UP (ref 0.65–1.3)
KETONES UR-MCNC: NEGATIVE — SIGNIFICANT CHANGE UP
LEUKOCYTE ESTERASE UR-ACNC: ABNORMAL
MAGNESIUM SERPL-MCNC: 1.9 MG/DL — SIGNIFICANT CHANGE UP (ref 1.8–2.4)
MCHC RBC-ENTMCNC: 32 PG — HIGH (ref 27–31)
MCHC RBC-ENTMCNC: 33 G/DL — SIGNIFICANT CHANGE UP (ref 32–37)
MCV RBC AUTO: 97 FL — HIGH (ref 80–94)
METHOD TYPE: SIGNIFICANT CHANGE UP
NITRITE UR-MCNC: NEGATIVE — SIGNIFICANT CHANGE UP
NRBC # BLD: 0 /100 WBCS — SIGNIFICANT CHANGE UP (ref 0–0)
PH UR: 8.5 — HIGH (ref 5–8)
PLATELET # BLD AUTO: 340 K/UL — SIGNIFICANT CHANGE UP (ref 130–400)
POTASSIUM SERPL-MCNC: 4.8 MMOL/L — SIGNIFICANT CHANGE UP (ref 3.5–5)
POTASSIUM SERPL-SCNC: 4.8 MMOL/L — SIGNIFICANT CHANGE UP (ref 3.5–5)
PROT SERPL-MCNC: 7 G/DL — SIGNIFICANT CHANGE UP (ref 6–8)
PROT UR-MCNC: ABNORMAL
PROTHROM AB SERPL-ACNC: 12.6 SEC — SIGNIFICANT CHANGE UP (ref 9.95–12.87)
RBC # BLD: 2.31 M/UL — LOW (ref 4.7–6.1)
RBC # FLD: 15.5 % — HIGH (ref 11.5–14.5)
RBC CASTS # UR COMP ASSIST: 6 /HPF — HIGH (ref 0–4)
SODIUM SERPL-SCNC: 137 MMOL/L — SIGNIFICANT CHANGE UP (ref 135–146)
SP GR SPEC: 1.01 — SIGNIFICANT CHANGE UP (ref 1.01–1.03)
UROBILINOGEN FLD QL: SIGNIFICANT CHANGE UP
WBC # BLD: 8.97 K/UL — SIGNIFICANT CHANGE UP (ref 4.8–10.8)
WBC # FLD AUTO: 8.97 K/UL — SIGNIFICANT CHANGE UP (ref 4.8–10.8)
WBC UR QL: 13 /HPF — HIGH (ref 0–5)

## 2022-10-23 PROCEDURE — 99232 SBSQ HOSP IP/OBS MODERATE 35: CPT | Mod: 24

## 2022-10-23 PROCEDURE — 99232 SBSQ HOSP IP/OBS MODERATE 35: CPT

## 2022-10-23 RX ORDER — MAGNESIUM HYDROXIDE 400 MG/1
30 TABLET, CHEWABLE ORAL ONCE
Refills: 0 | Status: COMPLETED | OUTPATIENT
Start: 2022-10-23 | End: 2022-10-23

## 2022-10-23 RX ORDER — FLUCONAZOLE 150 MG/1
150 TABLET ORAL ONCE
Refills: 0 | Status: DISCONTINUED | OUTPATIENT
Start: 2022-10-23 | End: 2022-10-24

## 2022-10-23 RX ORDER — INSULIN GLARGINE 100 [IU]/ML
12 INJECTION, SOLUTION SUBCUTANEOUS AT BEDTIME
Refills: 0 | Status: DISCONTINUED | OUTPATIENT
Start: 2022-10-23 | End: 2022-10-24

## 2022-10-23 RX ADMIN — Medication 4: at 17:04

## 2022-10-23 RX ADMIN — Medication 81 MILLIGRAM(S): at 13:11

## 2022-10-23 RX ADMIN — Medication 1 APPLICATION(S): at 13:12

## 2022-10-23 RX ADMIN — SEVELAMER CARBONATE 800 MILLIGRAM(S): 2400 POWDER, FOR SUSPENSION ORAL at 05:32

## 2022-10-23 RX ADMIN — SEVELAMER CARBONATE 800 MILLIGRAM(S): 2400 POWDER, FOR SUSPENSION ORAL at 13:11

## 2022-10-23 RX ADMIN — SEVELAMER CARBONATE 800 MILLIGRAM(S): 2400 POWDER, FOR SUSPENSION ORAL at 21:56

## 2022-10-23 RX ADMIN — SENNA PLUS 1 TABLET(S): 8.6 TABLET ORAL at 05:33

## 2022-10-23 RX ADMIN — HEPARIN SODIUM 5000 UNIT(S): 5000 INJECTION INTRAVENOUS; SUBCUTANEOUS at 17:12

## 2022-10-23 RX ADMIN — MAGNESIUM HYDROXIDE 30 MILLILITER(S): 400 TABLET, CHEWABLE ORAL at 18:21

## 2022-10-23 RX ADMIN — MEROPENEM 100 MILLIGRAM(S): 1 INJECTION INTRAVENOUS at 02:03

## 2022-10-23 RX ADMIN — HEPARIN SODIUM 5000 UNIT(S): 5000 INJECTION INTRAVENOUS; SUBCUTANEOUS at 05:41

## 2022-10-23 RX ADMIN — Medication 50 MILLIGRAM(S): at 05:32

## 2022-10-23 RX ADMIN — ATORVASTATIN CALCIUM 40 MILLIGRAM(S): 80 TABLET, FILM COATED ORAL at 21:56

## 2022-10-23 RX ADMIN — INSULIN GLARGINE 12 UNIT(S): 100 INJECTION, SOLUTION SUBCUTANEOUS at 21:40

## 2022-10-23 RX ADMIN — SENNA PLUS 1 TABLET(S): 8.6 TABLET ORAL at 17:08

## 2022-10-23 RX ADMIN — Medication 50 MILLIGRAM(S): at 17:07

## 2022-10-23 NOTE — PROGRESS NOTE ADULT - ASSESSMENT
61 y/o with a PMHx of DM, HTN, ESRD (on dialysis M/W/F, last session on 10/14), CAD s/p 1 stent on 2008 and quadruple CABG on 2012, PAD s/p bilateral angioplasty at [facility], was transferred from [facility] complaining of weakness and unhealed wound in the left foot. Patient was evaluated by vascular surgery and podiatry at [facility], who recommended debriding the L foot wound. Patient requested to be transferred to [facility] where his vascular surgeons are.  # next HD tomorrow  # on meropenem   # podiatry  and vascular notes appreciated / angio 10/20 - chronic occlusion of AT and PT and a patent bypass, no intervention performed s/p OR   # BP controlled   # Phos noted on binders  #  iron stores noted / elevated ferritin  / increase RAMSEY / keep Hb >7   # will follow

## 2022-10-23 NOTE — PROGRESS NOTE ADULT - SUBJECTIVE AND OBJECTIVE BOX
Nephrology Progress Note    SCHWABACHER, LAWRENCE  [id number]  62y  Male    S:  Patient is seen and examined, events over the last 24h noted.    O:  Allergies:  No Known Allergies    Hospital Medications:   MEDICATIONS  (STANDING):  aspirin enteric coated 81 milliGRAM(s) Oral daily  atorvastatin 40 milliGRAM(s) Oral at bedtime  Dakins Solution - 1/2 Strength 1 Application(s) Topical daily  darbepoetin Injectable Syringe 25 MICROGram(s) IV Push <User Schedule>  glucagon  Injectable 1 milliGRAM(s) IntraMuscular once  heparin   Injectable 5000 Unit(s) SubCutaneous every 12 hours  influenza   Vaccine 0.5 milliLiter(s) IntraMuscular once  insulin glargine Injectable (LANTUS) 12 Unit(s) SubCutaneous at bedtime  insulin lispro (ADMELOG) corrective regimen sliding scale   SubCutaneous three times a day before meals  magnesium hydroxide Suspension 30 milliLiter(s) Oral once  meropenem  IVPB 500 milliGRAM(s) IV Intermittent every 24 hours  metoprolol tartrate 50 milliGRAM(s) Oral two times a day  polyethylene glycol 3350 17 Gram(s) Oral daily  senna 1 Tablet(s) Oral two times a day  sevelamer carbonate 800 milliGRAM(s) Oral three times a day    MEDICATIONS  (PRN):  acetaminophen     Tablet .. 650 milliGRAM(s) Oral every 6 hours PRN Temp greater or equal to 38C (100.4F), Mild Pain (1 - 3)  aluminum hydroxide/magnesium hydroxide/simethicone Suspension 30 milliLiter(s) Oral every 4 hours PRN Dyspepsia  melatonin 3 milliGRAM(s) Oral at bedtime PRN Insomnia  ondansetron Injectable 4 milliGRAM(s) IV Push every 8 hours PRN Nausea and/or Vomiting    Home Medications:  aspirin 81 mg oral tablet: 1 tab(s) orally once a day (14 Sep 2022 17:42)  furosemide 40 mg oral tablet: 2 tab(s) orally once a day (14 Sep 2022 17:42)  Levemir 100 units/mL subcutaneous solution: 40 unit(s) subcutaneous once a day (at bedtime) (14 Sep 2022 17:42)  Metoprolol Tartrate 50 mg oral tablet: 1 tab(s) orally 2 times a day (14 Sep 2022 17:42)  Plavix 75 mg oral tablet: 1 tab(s) orally once a day (14 Sep 2022 17:42)  sevelamer carbonate 800 mg oral tablet: 1 tab(s) orally 3 times a day (with meals) (20 Sep 2022 11:36)  Trulicity Pen 1.5 mg/0.5 mL subcutaneous solution: 1.5  subcutaneous once a week (14 Sep 2022 17:42)      VITALS:  Daily     Daily   T(F): 96.7 (10-23-22 @ 04:48), Max: 98 (10-22-22 @ 19:59)  HR: 79 (10-23-22 @ 04:48)  BP: 165/73 (10-23-22 @ 04:48)  RR: 18 (10-23-22 @ 04:48)  SpO2: 98% (10-23-22 @ 04:48)  Wt(kg): --  I&O's Detail    22 Oct 2022 07:01  -  23 Oct 2022 07:00  --------------------------------------------------------  IN:    Oral Fluid: 480 mL  Total IN: 480 mL    OUT:    Voided (mL): 700 mL  Total OUT: 700 mL    Total NET: -220 mL        I&O's Summary    22 Oct 2022 07:01  -  23 Oct 2022 07:00  --------------------------------------------------------  IN: 480 mL / OUT: 700 mL / NET: -220 mL          PHYSICAL EXAM:  Gen: NAD  Resp: b/l breath sounds  Card: S1/S2  Abd: soft  Extremities: bandaged  Vascular access: av      LABS:      10-23    137  |  96<L>  |  47<H>  ----------------------------<  111<H>  4.8   |  28  |  6.0<HH>    Ca    8.2<L>      23 Oct 2022 10:17  Mg     1.9     10-23    TPro  7.0  /  Alb  2.6<L>  /  TBili  0.3  /  DBili      /  AST  17  /  ALT  8   /  AlkPhos  155<H>  10-23    Phosphorus Level, Serum: 4.5 mg/dL (10-21-22 @ 10:30)  Phosphorus Level, Serum: 3.0 mg/dL (10-18-22 @ 06:24)    Vitamin D, 25-Hydroxy: 21 ng/mL (02-26-22 @ 16:00)  Intact PTH: 312 pg/mL (02-26-22 @ 16:00)                          7.4    8.97  )-----------( 340      ( 23 Oct 2022 10:17 )             22.4     Mean Cell Volume: 97.0 fL (10-23-22 @ 10:17)    Iron Total, Serum: 39 ug/dL (10-21-22 @ 10:30)  % Saturation, Iron: 29 % (10-21-22 @ 10:30)  Ferritin, Serum: 1126 ng/mL (10.21.22 @ 10:30)          Culture Results:   Moderate Proteus mirabilis  Rare Pseudomonas putida group  Rare Enterococcus faecium (10-21 @ 14:05)  Culture Results:   Rare Proteus vulgaris group  Few Enterococcus faecium  Rare Pseudomonas putida group (10-21 @ 14:04)    Creatinine trend:  Creatinine, Serum: 6.0 mg/dL (10-23-22 @ 10:17)  Creatinine, Serum: 4.9 mg/dL (10-22-22 @ 09:52)  Creatinine, Serum: 5.8 mg/dL (10-21-22 @ 10:30)  Creatinine, Serum: 5.9 mg/dL (10-21-22 @ 07:57)  Creatinine, Serum: 4.9 mg/dL (10-20-22 @ 08:08)  Creatinine, Serum: 4.2 mg/dL (10-19-22 @ 20:50)  Creatinine, Serum: 5.7 mg/dL (10-19-22 @ 09:20)

## 2022-10-23 NOTE — PROGRESS NOTE ADULT - SUBJECTIVE AND OBJECTIVE BOX
Podiatry Progress Note    Subjective:  SCHWABACHER, LAWRENCE is a  62y Male.   Seen bedside.   Patient is a 62y old  Male who presents with a chief complaint of Sepsis (23 Oct 2022 07:52)    Podiatry:  Patient seen this morning for wound evaluation and dressing change. Patient is scheduled for surgery on Monday 10/24/22 @ 11:00am excisional debridement of soft tissue and bone of left foot. Dressings were intact with moderate drainage on bandage after removal. Mild increase in periwound erythema noted to left foot.  Patient denies any pain. Denies f/c/n/v/sob.       Past Medical History and Surgical History  PAST MEDICAL & SURGICAL HISTORY:  CAD (coronary artery disease)  1 stent 2008      S/P CABG (coronary artery bypass graft)  x4      Diabetes mellitus      Transient ischemic attack (TIA)  2017; 2008      Chronic kidney disease (CKD)  Stage IV      Hypertension      Stented coronary artery  in 2008      Myocardial infarction  2012      PAD (peripheral artery disease)  S/p bypass left leg      HLD (hyperlipidemia)      BPH (benign prostatic hyperplasia)      Pain in left knee  s/p fall      OA (osteoarthritis)      [place] (hard of hearing)      Chronic anemia      S/P CABG (coronary artery bypass graft)  2012      H/O arterial bypass of lower limb  Left Lower Extremity (2016)      History of surgery  Left CEA (2017)  Left Pinkie toe Amputation (2014)  CABG x 4 (2012)  Card cath - stent (2008)        AV fistula  2019  LEFT AV FISTULA           Objective:  Vital Signs Last 24 Hrs  T(C): 35.9 (23 Oct 2022 04:48), Max: 36.7 (22 Oct 2022 19:59)  T(F): 96.7 (23 Oct 2022 04:48), Max: 98 (22 Oct 2022 19:59)  HR: 79 (23 Oct 2022 04:48) (79 - 84)  BP: 165/73 (23 Oct 2022 04:48) (140/65 - 165/73)  BP(mean): --  RR: 18 (23 Oct 2022 04:48) (18 - 18)  SpO2: 98% (23 Oct 2022 04:48) (98% - 98%)    Parameters below as of 22 Oct 2022 20:00  Patient On (Oxygen Delivery Method): room air                            7.4    8.97  )-----------( 340      ( 23 Oct 2022 10:17 )             22.4                 10-22    140  |  99  |  35<H>  ----------------------------<  134<H>  4.8   |  31  |  4.9<HH>    Ca    8.0<L>      22 Oct 2022 09:52  Mg     1.9     10-22    TPro  6.4  /  Alb  2.4<L>  /  TBili  0.3  /  DBili  x   /  AST  17  /  ALT  8   /  AlkPhos  161<H>  10-22        #Left Lower Extremity  Derm:   Open Left Plantar Ulcer @ Plantar Medial Rearfoot;    -Wound Base Fibrogranular; 50% Fibrous, 50% Granular; extensive slough overlay;    -Wound Probes to Bone w/ Moderate Serosanguinous Drainage;    -Wound Margins Irregular  Mild Cellulitis w/ Erythema of Left Lower Extremity;  Noted mild increase in periwound erythema    Vascular: DP and PT Pulses Diminished; Foot to Warm to the touch; Capillary Refill Delayed to the Toes;  Neuro: Protective Sensation Diminished / Moderately Neuropathic   MSK: Mild Charcot Deformity Left Foot; No Pain On Palpation at Wound Site     Assessment:  S/p Excisional Debridement of Soft Tissue & Bone, Left Foot; (DOS: 10/21/22)       Plan:  Chart reviewed and Patient evaluated. All Questions and Concerns Addressed and Answered  Local Wound Care; Wound Flushed w/ NS; Wound Packed w/ xeroform/ ABD x2 / DSD / Kerlix / Ace bandage  Weight Bearing Status; WBAT w/ Heel Touch w/ Surgical Shoe;   Continue w/ Local Wound Care; Q24 Dressing Changes;  Surgery scheduled for Monday 10/24/22 @ 11:00am excisional debridement of soft tissue and bone of left foot;   Request Medical Clearance;  Please optimize lab values prior to OR  T&S and COAG studies prior to OR  NPO @ mn 10/23/22  Discussed Plan w/ Attending Dr. Garvin     Podiatry

## 2022-10-24 ENCOUNTER — RESULT REVIEW (OUTPATIENT)
Age: 62
End: 2022-10-24

## 2022-10-24 ENCOUNTER — TRANSCRIPTION ENCOUNTER (OUTPATIENT)
Age: 62
End: 2022-10-24

## 2022-10-24 LAB
ALBUMIN SERPL ELPH-MCNC: 2.5 G/DL — LOW (ref 3.5–5.2)
ALP SERPL-CCNC: 184 U/L — HIGH (ref 30–115)
ALT FLD-CCNC: 6 U/L — SIGNIFICANT CHANGE UP (ref 0–41)
ANION GAP SERPL CALC-SCNC: 12 MMOL/L — SIGNIFICANT CHANGE UP (ref 7–14)
AST SERPL-CCNC: 12 U/L — SIGNIFICANT CHANGE UP (ref 0–41)
BILIRUB SERPL-MCNC: 0.2 MG/DL — SIGNIFICANT CHANGE UP (ref 0.2–1.2)
BUN SERPL-MCNC: 59 MG/DL — HIGH (ref 10–20)
CALCIUM SERPL-MCNC: 8 MG/DL — LOW (ref 8.4–10.4)
CHLORIDE SERPL-SCNC: 96 MMOL/L — LOW (ref 98–110)
CO2 SERPL-SCNC: 28 MMOL/L — SIGNIFICANT CHANGE UP (ref 17–32)
CREAT SERPL-MCNC: 6.7 MG/DL — CRITICAL HIGH (ref 0.7–1.5)
EGFR: 9 ML/MIN/1.73M2 — LOW
GLUCOSE BLDC GLUCOMTR-MCNC: 116 MG/DL — HIGH (ref 70–99)
GLUCOSE BLDC GLUCOMTR-MCNC: 152 MG/DL — HIGH (ref 70–99)
GLUCOSE BLDC GLUCOMTR-MCNC: 189 MG/DL — HIGH (ref 70–99)
GLUCOSE SERPL-MCNC: 130 MG/DL — HIGH (ref 70–99)
HCT VFR BLD CALC: 22.3 % — LOW (ref 42–52)
HGB BLD-MCNC: 7.1 G/DL — LOW (ref 14–18)
MAGNESIUM SERPL-MCNC: 2.1 MG/DL — SIGNIFICANT CHANGE UP (ref 1.8–2.4)
MCHC RBC-ENTMCNC: 31.4 PG — HIGH (ref 27–31)
MCHC RBC-ENTMCNC: 31.8 G/DL — LOW (ref 32–37)
MCV RBC AUTO: 98.7 FL — HIGH (ref 80–94)
NRBC # BLD: 0 /100 WBCS — SIGNIFICANT CHANGE UP (ref 0–0)
PLATELET # BLD AUTO: 347 K/UL — SIGNIFICANT CHANGE UP (ref 130–400)
POTASSIUM SERPL-MCNC: 5.5 MMOL/L — HIGH (ref 3.5–5)
POTASSIUM SERPL-SCNC: 5.5 MMOL/L — HIGH (ref 3.5–5)
PROT SERPL-MCNC: 6.7 G/DL — SIGNIFICANT CHANGE UP (ref 6–8)
RBC # BLD: 2.26 M/UL — LOW (ref 4.7–6.1)
RBC # FLD: 15.5 % — HIGH (ref 11.5–14.5)
SODIUM SERPL-SCNC: 136 MMOL/L — SIGNIFICANT CHANGE UP (ref 135–146)
SURGICAL PATHOLOGY STUDY: SIGNIFICANT CHANGE UP
WBC # BLD: 10.5 K/UL — SIGNIFICANT CHANGE UP (ref 4.8–10.8)
WBC # FLD AUTO: 10.5 K/UL — SIGNIFICANT CHANGE UP (ref 4.8–10.8)

## 2022-10-24 PROCEDURE — 11047 DBRDMT BONE EACH ADDL: CPT | Mod: 59

## 2022-10-24 PROCEDURE — 11044 DBRDMT BONE 1ST 20 SQ CM/<: CPT

## 2022-10-24 PROCEDURE — 99232 SBSQ HOSP IP/OBS MODERATE 35: CPT

## 2022-10-24 RX ORDER — HYDROMORPHONE HYDROCHLORIDE 2 MG/ML
0.5 INJECTION INTRAMUSCULAR; INTRAVENOUS; SUBCUTANEOUS
Refills: 0 | Status: DISCONTINUED | OUTPATIENT
Start: 2022-10-24 | End: 2022-10-24

## 2022-10-24 RX ORDER — GLUCAGON INJECTION, SOLUTION 0.5 MG/.1ML
1 INJECTION, SOLUTION SUBCUTANEOUS ONCE
Refills: 0 | Status: DISCONTINUED | OUTPATIENT
Start: 2022-10-24 | End: 2022-10-27

## 2022-10-24 RX ORDER — POLYETHYLENE GLYCOL 3350 17 G/17G
17 POWDER, FOR SOLUTION ORAL DAILY
Refills: 0 | Status: DISCONTINUED | OUTPATIENT
Start: 2022-10-24 | End: 2022-10-27

## 2022-10-24 RX ORDER — METOPROLOL TARTRATE 50 MG
50 TABLET ORAL
Refills: 0 | Status: DISCONTINUED | OUTPATIENT
Start: 2022-10-24 | End: 2022-10-27

## 2022-10-24 RX ORDER — HEPARIN SODIUM 5000 [USP'U]/ML
5000 INJECTION INTRAVENOUS; SUBCUTANEOUS EVERY 12 HOURS
Refills: 0 | Status: DISCONTINUED | OUTPATIENT
Start: 2022-10-24 | End: 2022-10-27

## 2022-10-24 RX ORDER — SENNA PLUS 8.6 MG/1
1 TABLET ORAL
Refills: 0 | Status: DISCONTINUED | OUTPATIENT
Start: 2022-10-24 | End: 2022-10-27

## 2022-10-24 RX ORDER — SODIUM CHLORIDE 9 MG/ML
1000 INJECTION INTRAMUSCULAR; INTRAVENOUS; SUBCUTANEOUS
Refills: 0 | Status: DISCONTINUED | OUTPATIENT
Start: 2022-10-24 | End: 2022-10-24

## 2022-10-24 RX ORDER — ACETAMINOPHEN 500 MG
650 TABLET ORAL EVERY 6 HOURS
Refills: 0 | Status: DISCONTINUED | OUTPATIENT
Start: 2022-10-24 | End: 2022-10-27

## 2022-10-24 RX ORDER — SEVELAMER CARBONATE 2400 MG/1
800 POWDER, FOR SUSPENSION ORAL THREE TIMES A DAY
Refills: 0 | Status: DISCONTINUED | OUTPATIENT
Start: 2022-10-24 | End: 2022-10-27

## 2022-10-24 RX ORDER — INSULIN GLARGINE 100 [IU]/ML
12 INJECTION, SOLUTION SUBCUTANEOUS AT BEDTIME
Refills: 0 | Status: DISCONTINUED | OUTPATIENT
Start: 2022-10-24 | End: 2022-10-27

## 2022-10-24 RX ORDER — DARBEPOETIN ALFA IN POLYSORBAT 200MCG/0.4
25 PEN INJECTOR (ML) SUBCUTANEOUS
Refills: 0 | Status: DISCONTINUED | OUTPATIENT
Start: 2022-10-24 | End: 2022-10-26

## 2022-10-24 RX ORDER — ASPIRIN/CALCIUM CARB/MAGNESIUM 324 MG
81 TABLET ORAL DAILY
Refills: 0 | Status: DISCONTINUED | OUTPATIENT
Start: 2022-10-24 | End: 2022-10-27

## 2022-10-24 RX ORDER — ONDANSETRON 8 MG/1
4 TABLET, FILM COATED ORAL EVERY 8 HOURS
Refills: 0 | Status: DISCONTINUED | OUTPATIENT
Start: 2022-10-24 | End: 2022-10-27

## 2022-10-24 RX ORDER — LANOLIN ALCOHOL/MO/W.PET/CERES
3 CREAM (GRAM) TOPICAL AT BEDTIME
Refills: 0 | Status: DISCONTINUED | OUTPATIENT
Start: 2022-10-24 | End: 2022-10-27

## 2022-10-24 RX ORDER — INSULIN LISPRO 100/ML
VIAL (ML) SUBCUTANEOUS
Refills: 0 | Status: DISCONTINUED | OUTPATIENT
Start: 2022-10-24 | End: 2022-10-27

## 2022-10-24 RX ORDER — ATORVASTATIN CALCIUM 80 MG/1
40 TABLET, FILM COATED ORAL AT BEDTIME
Refills: 0 | Status: DISCONTINUED | OUTPATIENT
Start: 2022-10-24 | End: 2022-10-27

## 2022-10-24 RX ORDER — SODIUM HYPOCHLORITE 0.125 %
1 SOLUTION, NON-ORAL MISCELLANEOUS DAILY
Refills: 0 | Status: DISCONTINUED | OUTPATIENT
Start: 2022-10-24 | End: 2022-10-27

## 2022-10-24 RX ORDER — MEROPENEM 1 G/30ML
500 INJECTION INTRAVENOUS EVERY 24 HOURS
Refills: 0 | Status: DISCONTINUED | OUTPATIENT
Start: 2022-10-24 | End: 2022-10-27

## 2022-10-24 RX ADMIN — SEVELAMER CARBONATE 800 MILLIGRAM(S): 2400 POWDER, FOR SUSPENSION ORAL at 21:16

## 2022-10-24 RX ADMIN — MEROPENEM 100 MILLIGRAM(S): 1 INJECTION INTRAVENOUS at 01:04

## 2022-10-24 RX ADMIN — SENNA PLUS 1 TABLET(S): 8.6 TABLET ORAL at 18:18

## 2022-10-24 RX ADMIN — HEPARIN SODIUM 5000 UNIT(S): 5000 INJECTION INTRAVENOUS; SUBCUTANEOUS at 17:50

## 2022-10-24 RX ADMIN — ATORVASTATIN CALCIUM 40 MILLIGRAM(S): 80 TABLET, FILM COATED ORAL at 21:16

## 2022-10-24 RX ADMIN — INSULIN GLARGINE 12 UNIT(S): 100 INJECTION, SOLUTION SUBCUTANEOUS at 21:16

## 2022-10-24 RX ADMIN — Medication 50 MILLIGRAM(S): at 18:18

## 2022-10-24 RX ADMIN — MEROPENEM 100 MILLIGRAM(S): 1 INJECTION INTRAVENOUS at 17:50

## 2022-10-24 RX ADMIN — SENNA PLUS 1 TABLET(S): 8.6 TABLET ORAL at 05:14

## 2022-10-24 RX ADMIN — SEVELAMER CARBONATE 800 MILLIGRAM(S): 2400 POWDER, FOR SUSPENSION ORAL at 05:14

## 2022-10-24 RX ADMIN — Medication 50 MILLIGRAM(S): at 05:14

## 2022-10-24 NOTE — PROGRESS NOTE ADULT - ASSESSMENT
ASSESSMENT  63 y/o with a PMHx of DM, HTN, ESRD (on dialysis M/W/F, last session on 10/14), CAD s/p 1 stent on 2008 and quadruple CABG on 2012, PAD s/p bilateral angioplasty at [facility], was transferred from [facility] complaining of weakness and unhealed wound in the left foot, found to have OM of calcaneus bone and ESBL E coli bacteremia.    IMPRESSION  #Left Calcaneous OM  - s/p removal of FB 9/16  - Wound Cx 9/16 - Enterobacter cloacae  - treated with two weeks post-HD vancomycin + cefepime  - wound CX 10/16 VRE Faecim, Enterobacter cloacae complex, Proteus vulgaris, Morganella morganii   - s/p debridement 10/21 Wound Cx Proteus mirabilis, VRE faecium, Pseudomonas putida  - s/p debridement 10/24    #ESBL E coli Bacteremia at [facility]     #ESRD on HD  #CAD s/p CABG  #PAD s/p bilateral angioplasty  #DM  #Abx allergy:      RECOMMENDATIONS  - continue meropenem 500 mg q 24 hours   - add daptomycin 12 mg/kg q 48 hours to cover VRE faecium  - follow-up OR cx from 10/24   - will likely need at least 6 weeks from last debridement  calcaneal OM       Please call or message on [employer] Teams if with any questions.  Spectra [phone]

## 2022-10-24 NOTE — PROGRESS NOTE ADULT - ASSESSMENT
61 y/o with a PMHx of DM, HTN, ESRD (on dialysis M/W/F, last session on 10/14), CAD s/p 1 stent on 2008 and quadruple CABG on 2012, PAD s/p bilateral angioplasty at [facility], was transferred from [facility] complaining of weakness and unhealed wound in the left foot.    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 hx of PAD   - Cont meropenem 500mg IV daily   - IR for angiogram: a) Left lower extremity angiogram demonstrating chronically occluded PT and AT with patent anterior tibial vein bypass reconstituting into the DP. b) No microvascular outflow demonstrates within the calcaneous. No hemodynamically significant stenosis within the left SFA and popliteal artery. No intervention performed  - Blood cx 10/15:NTD   - MRI L foot 10/17: a) Osteomyelitis and erosion of the calcaneus with a vertical pathologic fracture extending to the subtalar joint. Gangrene of overlying subcutaneous tissues. 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  - ID consult appreciated - will likely need at least 6 weeks for calcaneal OM   - Podiatry consult:  - S/p Initial debridement 10/21  a) Local Wound Care; Wound Flushed w/ NS; Wound Packed w/ xeroform/ ABD x2 / DSD / Kerlix / Ace bandage  b) Weight Bearing Status; WBAT w/ Heel Touch w/ Surgical Shoe;   c) Continue w/ Local Wound Care; Q24 Dressing Changes;  d) Surgery scheduled for Monday 10/24/22 @ 11:00am excisional debridement of soft tissue and bone of left foot;   - Pt given 1 unit PRBC for hgb 7.1 pre-op    #Dysuria - r/o UTI  - Pt already receiving meropenem - should cover most UTI causing bacteria  - Given 1x dose fluconazole in case of fungal origin   - UA WBC 13, nitrite negative, LEC small, bacteria negative   - F/u urine cx   - F/u Kidney & Bladder sonogram    # ESRD on HD  - HD as per nephrology   - C/w darbepoetin     # CAD s/p stent and CABG   - on statin   - Discuss with cardio. we're unable to hold aspirin. Cont aspirin   - holding plavix since 10/16  - continue BB  - Cardio consult appreciated. at moderate risk for surgery     # DM II  - hypoglycemic this am  - Lantus  15 units HS     # Constipation   - Cont miralax daily, senna    patient denies chest pain, sob. he's medically clear for surgery as benefits exceed the risks.

## 2022-10-24 NOTE — PROGRESS NOTE ADULT - SUBJECTIVE AND OBJECTIVE BOX
Nephrology progress note  Patient is seen and examined, events over the last 24 h noted .  complained of penile pain / burning sensation     Allergies:  No Known Allergies    Hospital Medications:   MEDICATIONS  (STANDING):    aspirin enteric coated 81 milliGRAM(s) Oral daily  atorvastatin 40 milliGRAM(s) Oral at bedtime  Dakins Solution - 1/2 Strength 1 Application(s) Topical daily  darbepoetin Injectable Syringe 25 MICROGram(s) IV Push <User Schedule>  fluconAZOLE   Tablet 150 milliGRAM(s) Oral once  glucagon  Injectable 1 milliGRAM(s) IntraMuscular once  heparin   Injectable 5000 Unit(s) SubCutaneous every 12 hours  influenza   Vaccine 0.5 milliLiter(s) IntraMuscular once  insulin glargine Injectable (LANTUS) 12 Unit(s) SubCutaneous at bedtime  insulin lispro (ADMELOG) corrective regimen sliding scale   SubCutaneous three times a day before meals  meropenem  IVPB 500 milliGRAM(s) IV Intermittent every 24 hours  metoprolol tartrate 50 milliGRAM(s) Oral two times a day  polyethylene glycol 3350 17 Gram(s) Oral daily  senna 1 Tablet(s) Oral two times a day  sevelamer carbonate 800 milliGRAM(s) Oral three times a day        VITALS:  T(F): 97.2 (10-24-22 @ 06:18), Max: 97.2 (10-24-22 @ 04:38)  HR: 89 (10-24-22 @ 06:18)  BP: 178/83 (10-24-22 @ 06:18)  RR: 18 (10-24-22 @ 06:18)  SpO2: 99% (10-24-22 @ 06:18)    10-22 @ 07:01  -  10-23 @ 07:00  --------------------------------------------------------  IN: 480 mL / OUT: 700 mL / NET: -220 mL      Height (cm): 173 (10-24 @ 06:18)  Weight (kg): 90.1 (10-24 @ 06:18)  BMI (kg/m2): 30.1 (10-24 @ 06:18)  BSA (m2): 2.04 (10-24 @ 06:18)    PHYSICAL EXAM:  Constitutional: NAD  Neck: No JVD  Respiratory: CTAB  Cardiovascular: S1, S2, RRR  Gastrointestinal: BS+, soft, NT/ND  Extremities: No cyanosis or clubbing. No peripheral edema  :  No schneider. no penile discharge no hematuria   Skin: No rashes    LABS:  10-23    137  |  96<L>  |  47<H>  ----------------------------<  111<H>  4.8   |  28  |  6.0<HH>    Ca    8.2<L>      23 Oct 2022 10:17  Mg     1.9     10-23    TPro  7.0  /  Alb  2.6<L>  /  TBili  0.3  /  DBili      /  AST  17  /  ALT  8   /  AlkPhos  155<H>  10-23                          7.4    8.97  )-----------( 340      ( 23 Oct 2022 10:17 )             22.4       Urine Studies:  Urinalysis Basic - ( 23 Oct 2022 18:53 )    Color: Yellow / Appearance: Slightly Turbid / S.011 / pH:   Gluc:  / Ketone: Negative  / Bili: Negative / Urobili: <2 mg/dL   Blood:  / Protein: 100 mg/dL / Nitrite: Negative   Leuk Esterase: Small / RBC: 6 /HPF / WBC 13 /HPF   Sq Epi:  / Non Sq Epi: 9 /HPF / Bacteria: Negative          Iron 39, TIBC 133, %sat 29      [10-21-22 @ 10:30]  Ferritin 1126      [10-21-22 @ 10:30]  PTH -- (Ca 7.5)      [22 @ 16:00]   312  Vitamin D (25OH) 21      [22 @ 16:00]  HbA1c 5.8      [20 @ 06:46]  Lipid: chol 81, , HDL 22, LDL --      [10-15-22 @ 09:53]    HBsAb <3.0      [19 @ 17:44]  HBsAb Nonreact      [19 @ 17:44]  HBsAg Nonreact      [19 @ 17:44]  HBcAb Nonreact      [19 @ 17:44]  HCV 0.10, Nonreact      [19 @ 17:44]        RADIOLOGY & ADDITIONAL STUDIES:

## 2022-10-24 NOTE — PROGRESS NOTE ADULT - SUBJECTIVE AND OBJECTIVE BOX
LAWRENCE SCHWABACHER 62y Male  MRN#: [id number]     Hospital Day: 10d    Pt is currently admitted with the primary diagnosis of  BACTEREMIA        HOSPITAL COURSE:     61 y/o with a PMHx of DM, HTN, ESRD (on dialysis M/W/F), CAD s/p 1 stent () and 4x CABG (), PAD s/p bilateral angioplasty at [facility], was transferred from [facility] complaining of weakness and unhealed wound in the left foot.  Two months ago the patient inadvertently stepped on glass while cleaning his house injuring his left foot. His podiatrist removed glass fragments and sent him home on a week long course of antibiotics (patient unsure about which ABx). According to the patient the wound failed to improve, he progressively developed weakness on his legs, and recurring vomiting, which prompted him to go to [facility]'s ED 10/11/22. At [facility] patient was found to be septic and was started on IV Vancomycin 1000mg and Meropenem 500mg. Blood cultures grew ESBL E.Coli. An MRI of the foot showed "osteomyelitis of the calcaneus bone, with what appears to be a subacute fracture". Patient was evaluated by vascular surgery and podiatry at [facility], who recommended debriding the L foot wound. Patient requested to be transferred to [facility] where his vascular surgeons are. Pt underwent initial debridement 10/21, podiatry recommending second debridement 10/24/22. Continuing on meropenem.       SUBJECTIVE     Overnight events  None    Subjective complaints  Pt was evaluated at bedside. Pt denied active complaints.                                             ----------------------------------------------------------  OBJECTIVE  PAST MEDICAL & SURGICAL HISTORY  CAD (coronary artery disease)  1 stent     S/P CABG (coronary artery bypass graft)  x4    Diabetes mellitus    Transient ischemic attack (TIA)  2017;     Chronic kidney disease (CKD)  Stage IV    Hypertension    Stented coronary artery  in     Myocardial infarction  2012    PAD (peripheral artery disease)  S/p bypass left leg    HLD (hyperlipidemia)    BPH (benign prostatic hyperplasia)    Pain in left knee  s/p fall    OA (osteoarthritis)    [place] (hard of hearing)    Chronic anemia    S/P CABG (coronary artery bypass graft)      H/O arterial bypass of lower limb  Left Lower Extremity ()    History of surgery  Left CEA (2017)  Left Pinkie toe Amputation ()  CABG x 4 ()  Card cath - stent ()      AV fistula  2019  LEFT AV FISTULA                                              -----------------------------------------------------------  ALLERGIES:  No Known Allergies                                            ------------------------------------------------------------    HOME MEDICATIONS  Home Medications:  aspirin 81 mg oral tablet: 1 tab(s) orally once a day (14 Sep 2022 17:42)  furosemide 40 mg oral tablet: 2 tab(s) orally once a day (14 Sep 2022 17:42)  Levemir 100 units/mL subcutaneous solution: 40 unit(s) subcutaneous once a day (at bedtime) (14 Sep 2022 17:42)  Metoprolol Tartrate 50 mg oral tablet: 1 tab(s) orally 2 times a day (14 Sep 2022 17:42)  Plavix 75 mg oral tablet: 1 tab(s) orally once a day (14 Sep 2022 17:42)  sevelamer carbonate 800 mg oral tablet: 1 tab(s) orally 3 times a day (with meals) (20 Sep 2022 11:36)  Trulicity Pen 1.5 mg/0.5 mL subcutaneous solution: 1.5  subcutaneous once a week (14 Sep 2022 17:42)                           MEDICATIONS:  STANDING MEDICATIONS  aspirin enteric coated 81 milliGRAM(s) Oral daily  atorvastatin 40 milliGRAM(s) Oral at bedtime  Dakins Solution - 1/2 Strength 1 Application(s) Topical daily  darbepoetin Injectable Syringe 25 MICROGram(s) IV Push <User Schedule>  fluconAZOLE   Tablet 150 milliGRAM(s) Oral once  glucagon  Injectable 1 milliGRAM(s) IntraMuscular once  heparin   Injectable 5000 Unit(s) SubCutaneous every 12 hours  influenza   Vaccine 0.5 milliLiter(s) IntraMuscular once  insulin glargine Injectable (LANTUS) 12 Unit(s) SubCutaneous at bedtime  insulin lispro (ADMELOG) corrective regimen sliding scale   SubCutaneous three times a day before meals  meropenem  IVPB 500 milliGRAM(s) IV Intermittent every 24 hours  metoprolol tartrate 50 milliGRAM(s) Oral two times a day  polyethylene glycol 3350 17 Gram(s) Oral daily  senna 1 Tablet(s) Oral two times a day  sevelamer carbonate 800 milliGRAM(s) Oral three times a day    PRN MEDICATIONS  acetaminophen     Tablet .. 650 milliGRAM(s) Oral every 6 hours PRN  aluminum hydroxide/magnesium hydroxide/simethicone Suspension 30 milliLiter(s) Oral every 4 hours PRN  melatonin 3 milliGRAM(s) Oral at bedtime PRN  ondansetron Injectable 4 milliGRAM(s) IV Push every 8 hours PRN                                            ------------------------------------------------------------  VITAL SIGNS: Last 24 Hours  T(C): 36.6 (24 Oct 2022 13:00), Max: 36.6 (24 Oct 2022 13:00)  T(F): 97.8 (24 Oct 2022 13:00), Max: 97.8 (24 Oct 2022 13:00)  HR: 77 (24 Oct 2022 13:35) (68 - 89)  BP: 140/70 (24 Oct 2022 13:35) (140/70 - 178/83)  BP(mean): --  RR: 18 (24 Oct 2022 13:35) (15 - 18)  SpO2: 99% (24 Oct 2022 13:00) (99% - 100%)      10-24-22 @ 07:01  -  10-24-22 @ 13:49  --------------------------------------------------------  IN: 315 mL / OUT: 3000 mL / NET: -2685 mL                                             --------------------------------------------------------------  LABS:                        7.1    10.50 )-----------( 347      ( 24 Oct 2022 07:25 )             22.3     10-24    136  |  96<L>  |  59<H>  ----------------------------<  130<H>  5.5<H>   |  28  |  6.7<HH>    Ca    8.0<L>      24 Oct 2022 07:25  Mg     2.1     10-24    TPro  6.7  /  Alb  2.5<L>  /  TBili  0.2  /  DBili  x   /  AST  12  /  ALT  6   /  AlkPhos  184<H>  10-24    PT/INR - ( 23 Oct 2022 10:17 )   PT: 12.60 sec;   INR: 1.10 ratio         PTT - ( 23 Oct 2022 10:17 )  PTT:32.9 sec  Urinalysis Basic - ( 23 Oct 2022 18:53 )    Color: Yellow / Appearance: Slightly Turbid / S.011 / pH: x  Gluc: x / Ketone: Negative  / Bili: Negative / Urobili: <2 mg/dL   Blood: x / Protein: 100 mg/dL / Nitrite: Negative   Leuk Esterase: Small / RBC: 6 /HPF / WBC 13 /HPF   Sq Epi: x / Non Sq Epi: 9 /HPF / Bacteria: Negative              Culture - Tissue with Gram Stain (collected 21 Oct 2022 14:05)  Source: .Tissue None  Gram Stain (22 Oct 2022 01:55):    No polymorphonuclear leukocytes seen per low power field    No organisms seen per oil power field  Preliminary Report (23 Oct 2022 19:47):    Moderate Proteus mirabilis    Rare Pseudomonas putida group    Rare Enterococcus faecium (vancomycin resistant)    Rare Staphylococcus haemolyticus  Organism: Proteus mirabilis  Pseudomonas putida group  Enterococcus faecium (vancomycin resistant)  Staphylococcus haemolyticus (23 Oct 2022 19:45)  Organism: Staphylococcus haemolyticus (23 Oct 2022 19:45)  Organism: Enterococcus faecium (vancomycin resistant) (23 Oct 2022 19:44)  Organism: Pseudomonas putida group (23 Oct 2022 19:44)  Organism: Proteus mirabilis (23 Oct 2022 19:43)    Culture - Surgical Swab (collected 21 Oct 2022 14:04)  Source: .Surgical Swab None  Preliminary Report (23 Oct 2022 19:41):    Rare Proteus vulgaris group    Few Enterococcus faecium (vancomycin resistant)    Rare Pseudomonas putida group    Rare Coag Negative Staphylococcus "Susceptibilities not performed"  Organism: Proteus vulgaris group  Enterococcus faecium (vancomycin resistant)  Pseudomonas putida group (23 Oct 2022 19:40)  Organism: Pseudomonas putida group (23 Oct 2022 19:40)  Organism: Enterococcus faecium (vancomycin resistant) (23 Oct 2022 19:40)  Organism: Proteus vulgaris group (23 Oct 2022 19:40)                                                    -------------------------------------------------------------  RADIOLOGY:                                            --------------------------------------------------------------    PHYSICAL EXAM:  GENERAL: NAD, lying in bed comfortably  HEENT:  Atraumatic, Normocephalic  NECK: Supple, trachea midline  CHEST/LUNG: BL BS CTA. Unlabored respirations  HEART: S1S2 audible; RRR, no murmur  ABDOMEN: Soft, Nontender, Nondistended.    EXTREMITIES: Warm, no edema  NERVOUS SYSTEM:  Awake and alert.   SKIN: No rashes or lesions                                           --------------------------------------------------------------                 LAWRENCE SCHWABACHER 62y Male  MRN#: [id number]     Hospital Day: 10d    Pt is currently admitted with the primary diagnosis of  BACTEREMIA        HOSPITAL COURSE:     63 y/o with a PMHx of DM, HTN, ESRD (on dialysis M/W/F), CAD s/p 1 stent () and 4x CABG (), PAD s/p bilateral angioplasty at [facility], was transferred from [facility] complaining of weakness and unhealed wound in the left foot.  Two months ago the patient inadvertently stepped on glass while cleaning his house injuring his left foot. His podiatrist removed glass fragments and sent him home on a week long course of antibiotics (patient unsure about which ABx). According to the patient the wound failed to improve, he progressively developed weakness on his legs, and recurring vomiting, which prompted him to go to [facility]'s ED 10/11/22. At [facility] patient was found to be septic and was started on IV Vancomycin 1000mg and Meropenem 500mg. Blood cultures grew ESBL E.Coli. An MRI of the foot showed "osteomyelitis of the calcaneus bone, with what appears to be a subacute fracture". Patient was evaluated by vascular surgery and podiatry at [facility], who recommended debriding the L foot wound. Patient requested to be transferred to [facility] where his vascular surgeons are. Pt underwent initial debridement 10/21, podiatry recommending second debridement 10/24/22. Continuing on meropenem.       SUBJECTIVE     Overnight events  None    Subjective complaints  Pt was evaluated at bedside. Pt denied active complaints.                                             ----------------------------------------------------------  OBJECTIVE  PAST MEDICAL & SURGICAL HISTORY  CAD (coronary artery disease)  1 stent     S/P CABG (coronary artery bypass graft)  x4    Diabetes mellitus    Transient ischemic attack (TIA)  2017;     Chronic kidney disease (CKD)  Stage IV    Hypertension    Stented coronary artery  in     Myocardial infarction  2012    PAD (peripheral artery disease)  S/p bypass left leg    HLD (hyperlipidemia)    BPH (benign prostatic hyperplasia)    Pain in left knee  s/p fall    OA (osteoarthritis)    [place] (hard of hearing)    Chronic anemia    S/P CABG (coronary artery bypass graft)      H/O arterial bypass of lower limb  Left Lower Extremity ()    History of surgery  Left CEA (2017)  Left Pinkie toe Amputation ()  CABG x 4 ()  Card cath - stent ()      AV fistula  2019  LEFT AV FISTULA                                              -----------------------------------------------------------  ALLERGIES:  No Known Allergies                                            ------------------------------------------------------------    HOME MEDICATIONS  Home Medications:  aspirin 81 mg oral tablet: 1 tab(s) orally once a day (14 Sep 2022 17:42)  furosemide 40 mg oral tablet: 2 tab(s) orally once a day (14 Sep 2022 17:42)  Levemir 100 units/mL subcutaneous solution: 40 unit(s) subcutaneous once a day (at bedtime) (14 Sep 2022 17:42)  Metoprolol Tartrate 50 mg oral tablet: 1 tab(s) orally 2 times a day (14 Sep 2022 17:42)  Plavix 75 mg oral tablet: 1 tab(s) orally once a day (14 Sep 2022 17:42)  sevelamer carbonate 800 mg oral tablet: 1 tab(s) orally 3 times a day (with meals) (20 Sep 2022 11:36)  Trulicity Pen 1.5 mg/0.5 mL subcutaneous solution: 1.5  subcutaneous once a week (14 Sep 2022 17:42)                           MEDICATIONS:  STANDING MEDICATIONS  aspirin enteric coated 81 milliGRAM(s) Oral daily  atorvastatin 40 milliGRAM(s) Oral at bedtime  Dakins Solution - 1/2 Strength 1 Application(s) Topical daily  darbepoetin Injectable Syringe 25 MICROGram(s) IV Push <User Schedule>  fluconAZOLE   Tablet 150 milliGRAM(s) Oral once  glucagon  Injectable 1 milliGRAM(s) IntraMuscular once  heparin   Injectable 5000 Unit(s) SubCutaneous every 12 hours  influenza   Vaccine 0.5 milliLiter(s) IntraMuscular once  insulin glargine Injectable (LANTUS) 12 Unit(s) SubCutaneous at bedtime  insulin lispro (ADMELOG) corrective regimen sliding scale   SubCutaneous three times a day before meals  meropenem  IVPB 500 milliGRAM(s) IV Intermittent every 24 hours  metoprolol tartrate 50 milliGRAM(s) Oral two times a day  polyethylene glycol 3350 17 Gram(s) Oral daily  senna 1 Tablet(s) Oral two times a day  sevelamer carbonate 800 milliGRAM(s) Oral three times a day    PRN MEDICATIONS  acetaminophen     Tablet .. 650 milliGRAM(s) Oral every 6 hours PRN  aluminum hydroxide/magnesium hydroxide/simethicone Suspension 30 milliLiter(s) Oral every 4 hours PRN  melatonin 3 milliGRAM(s) Oral at bedtime PRN  ondansetron Injectable 4 milliGRAM(s) IV Push every 8 hours PRN                                            ------------------------------------------------------------  VITAL SIGNS: Last 24 Hours  T(C): 36.6 (24 Oct 2022 13:00), Max: 36.6 (24 Oct 2022 13:00)  T(F): 97.8 (24 Oct 2022 13:00), Max: 97.8 (24 Oct 2022 13:00)  HR: 77 (24 Oct 2022 13:35) (68 - 89)  BP: 140/70 (24 Oct 2022 13:35) (140/70 - 178/83)  BP(mean): --  RR: 18 (24 Oct 2022 13:35) (15 - 18)  SpO2: 99% (24 Oct 2022 13:00) (99% - 100%)      10-24-22 @ 07:01  -  10-24-22 @ 13:49  --------------------------------------------------------  IN: 315 mL / OUT: 3000 mL / NET: -2685 mL                                             --------------------------------------------------------------  LABS:                        7.1    10.50 )-----------( 347      ( 24 Oct 2022 07:25 )             22.3     10-24    136  |  96<L>  |  59<H>  ----------------------------<  130<H>  5.5<H>   |  28  |  6.7<HH>    Ca    8.0<L>      24 Oct 2022 07:25  Mg     2.1     10-24    TPro  6.7  /  Alb  2.5<L>  /  TBili  0.2  /  DBili  x   /  AST  12  /  ALT  6   /  AlkPhos  184<H>  10-24    PT/INR - ( 23 Oct 2022 10:17 )   PT: 12.60 sec;   INR: 1.10 ratio         PTT - ( 23 Oct 2022 10:17 )  PTT:32.9 sec  Urinalysis Basic - ( 23 Oct 2022 18:53 )    Color: Yellow / Appearance: Slightly Turbid / S.011 / pH: x  Gluc: x / Ketone: Negative  / Bili: Negative / Urobili: <2 mg/dL   Blood: x / Protein: 100 mg/dL / Nitrite: Negative   Leuk Esterase: Small / RBC: 6 /HPF / WBC 13 /HPF   Sq Epi: x / Non Sq Epi: 9 /HPF / Bacteria: Negative              Culture - Tissue with Gram Stain (collected 21 Oct 2022 14:05)  Source: .Tissue None  Gram Stain (22 Oct 2022 01:55):    No polymorphonuclear leukocytes seen per low power field    No organisms seen per oil power field  Preliminary Report (23 Oct 2022 19:47):    Moderate Proteus mirabilis    Rare Pseudomonas putida group    Rare Enterococcus faecium (vancomycin resistant)    Rare Staphylococcus haemolyticus  Organism: Proteus mirabilis  Pseudomonas putida group  Enterococcus faecium (vancomycin resistant)  Staphylococcus haemolyticus (23 Oct 2022 19:45)  Organism: Staphylococcus haemolyticus (23 Oct 2022 19:45)  Organism: Enterococcus faecium (vancomycin resistant) (23 Oct 2022 19:44)  Organism: Pseudomonas putida group (23 Oct 2022 19:44)  Organism: Proteus mirabilis (23 Oct 2022 19:43)    Culture - Surgical Swab (collected 21 Oct 2022 14:04)  Source: .Surgical Swab None  Preliminary Report (23 Oct 2022 19:41):    Rare Proteus vulgaris group    Few Enterococcus faecium (vancomycin resistant)    Rare Pseudomonas putida group    Rare Coag Negative Staphylococcus "Susceptibilities not performed"  Organism: Proteus vulgaris group  Enterococcus faecium (vancomycin resistant)  Pseudomonas putida group (23 Oct 2022 19:40)  Organism: Pseudomonas putida group (23 Oct 2022 19:40)  Organism: Enterococcus faecium (vancomycin resistant) (23 Oct 2022 19:40)  Organism: Proteus vulgaris group (23 Oct 2022 19:40)                                                    -------------------------------------------------------------  RADIOLOGY:                                            --------------------------------------------------------------    PHYSICAL EXAM:  GENERAL: NAD, lying in bed comfortably  HEENT:  Atraumatic, Normocephalic  NECK: Supple, trachea midline  CHEST/LUNG: BL BS CTA. Unlabored respirations  HEART: S1S2 audible; RRR,  ABDOMEN: Soft, Nontender, Nondistended.    EXTREMITIES: Warm, LLE swelling  NERVOUS SYSTEM:  Awake and alert.   SKIN: left foot swollen and wrapped                                            --------------------------------------------------------------

## 2022-10-24 NOTE — PROGRESS NOTE ADULT - SUBJECTIVE AND OBJECTIVE BOX
SCHWABACHER, LAWRENCE  62y  [facility] F3-4B 014 B      Patient is a 62y old  Male who presents with a chief complaint of Sepsis (19 Oct 2022 14:02)      INTERVAL HPI/OVERNIGHT EVENTS:            FAMILY HISTORY:  Family history of heart disease (Father)    DM (diabetes mellitus) (Mother)    ESRD (end stage renal disease) on dialysis (Mother)      T(C): 35.8 (10-19-22 @ 12:58), Max: 36.3 (10-18-22 @ 21:14)  HR: 78 (10-19-22 @ 12:58) (69 - 79)  BP: 134/60 (10-19-22 @ 12:58) (134/60 - 167/86)  RR: 18 (10-19-22 @ 12:58) (18 - 18)  SpO2: 99% (10-19-22 @ 12:58) (99% - 99%)  Wt(kg): --Vital Signs Last 24 Hrs  T(C): 35.8 (19 Oct 2022 12:58), Max: 36.3 (18 Oct 2022 21:14)  T(F): 96.5 (19 Oct 2022 12:58), Max: 97.3 (18 Oct 2022 21:14)  HR: 78 (19 Oct 2022 12:58) (69 - 79)  BP: 134/60 (19 Oct 2022 12:58) (134/60 - 167/86)  BP(mean): --  RR: 18 (19 Oct 2022 12:58) (18 - 18)  SpO2: 99% (19 Oct 2022 12:58) (99% - 99%)    Parameters below as of 19 Oct 2022 12:58  Patient On (Oxygen Delivery Method): room air        PHYSICAL EXAM:  GENERAL: NAD, well-groomed, well-developed  PULM: Clear to auscultation bilaterally  CARDIAC: Regular rate and rhythm;  GI: Soft, Nontender, Nondistended; Bowel sounds present  EXTREMITIES:  left foot swollen and wrapped     Consultant(s) Notes Reviewed:  [x ] YES  [ ] NO  Care Discussed with Consultants/Other Providers [ x] YES  [ ] NO    LABS:                            7.4    11.48 )-----------( 389      ( 19 Oct 2022 09:20 )             22.7   10-19    133<L>  |  97<L>  |  53<H>  ----------------------------<  91  4.9   |  25  |  5.7<HH>    Ca    8.0<L>      19 Oct 2022 09:20  Phos  3.0     10-18  Mg     2.0     10-19    TPro  6.2  /  Alb  2.3<L>  /  TBili  0.2  /  DBili  x   /  AST  12  /  ALT  6   /  AlkPhos  143<H>  10-19            Culture - Abscess with Gram Stain (collected 16 Oct 2022 17:50)  Source: .Abscess left foot wound  Preliminary Report (18 Oct 2022 19:12):    Rare Morganella morganii    Rare Enterobacter cloacae complex    Rare Proteus vulgaris group    Rare Enterococcus faecium (vancomycin resistant)  Organism: Morganella morganii  Enterobacter cloacae complex  Proteus vulgaris group  Enterococcus faecium (vancomycin resistant) (18 Oct 2022 19:11)  Organism: Enterococcus faecium (vancomycin resistant) (18 Oct 2022 19:11)  Organism: Proteus vulgaris group (18 Oct 2022 19:05)  Organism: Enterobacter cloacae complex (18 Oct 2022 19:05)  Organism: Morganella morganii (18 Oct 2022 19:05)      acetaminophen     Tablet .. 650 milliGRAM(s) Oral every 6 hours PRN  aluminum hydroxide/magnesium hydroxide/simethicone Suspension 30 milliLiter(s) Oral every 4 hours PRN  aspirin enteric coated 81 milliGRAM(s) Oral daily  atorvastatin 40 milliGRAM(s) Oral at bedtime  darbepoetin Injectable Syringe 25 MICROGram(s) IV Push <User Schedule>  dextrose 10% Bolus 125 milliLiter(s) IV Bolus once  dextrose 5%. 1000 milliLiter(s) IV Continuous <Continuous>  dextrose 5%. 1000 milliLiter(s) IV Continuous <Continuous>  dextrose 5%. 1000 milliLiter(s) IV Continuous <Continuous>  dextrose 50% Injectable 25 Gram(s) IV Push once  dextrose 50% Injectable 12.5 Gram(s) IV Push once  dextrose 50% Injectable 25 Gram(s) IV Push once  dextrose Oral Gel 15 Gram(s) Oral once PRN  glucagon  Injectable 1 milliGRAM(s) IntraMuscular once  influenza   Vaccine 0.5 milliLiter(s) IntraMuscular once  insulin glargine Injectable (LANTUS) 15 Unit(s) SubCutaneous at bedtime  insulin lispro (ADMELOG) corrective regimen sliding scale   SubCutaneous three times a day before meals  melatonin 3 milliGRAM(s) Oral at bedtime PRN  meropenem  IVPB 500 milliGRAM(s) IV Intermittent every 24 hours  metoprolol tartrate 50 milliGRAM(s) Oral two times a day  ondansetron Injectable 4 milliGRAM(s) IV Push every 8 hours PRN  sevelamer carbonate 800 milliGRAM(s) Oral three times a day    63 y/o with a PMHx of DM, HTN, ESRD (on dialysis M/W/F, last session on 10/14), CAD s/p 1 stent on 2008 and quadruple CABG on 2012, PAD s/p bilateral angioplasty at [facility], was transferred from [facility] complaining of weakness and unhealed wound in the left foot.    1. 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 hx of PAD   - Cont meropenem 500mg IV daily   - IR for angiogram:  a) Left lower extremity angiogram demonstrating chronically occluded PT and AT with patent anterior tibial vein bypass reconstituting into the DP.   b) No microvascular outflow demonstrates within the calcaneous. No hemodynamically significant stenosis within the left SFA and popliteal artery. No intervention performed  - Blood cx:NTD   - MRI L foot done:  a) Osteomyelitis and erosion of the calcaneus with a vertical pathologic fracture extending to the subtalar joint. Gangrene of overlying subcutaneous tissues. 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  - ID consult appreciated :  a) Left Calcaneous OM s/p removal of FB 9/16  b)  Wound Cx 9/16 - Enterobacter cloacae. treated with two weeks post-HD vancomycin + cefepime  c)  wound CX 10/16 VRE Faecim, Enterobacter cloacae complex, Proteus vulgaris, Morganella morganii s/p debridement 10/21 Wound Cx Proteus mirabilis, VRE faecium, Pseudomonas putida  and s/p debridement 10/24  d) ESBL E coli Bacteremia at [facility]   - Podiatry consult:  a) Local Wound Care; Wound Flushed w/ NS; Wound Packed w/ xeroform/ ABD x2 / DSD / Kerlix / Ace bandage  b) Weight Bearing Status; WBAT w/ Heel Touch w/ Surgical Shoe;   c) Continue w/ Local Wound Care; Q24 Dressing Changes;  d) Surgery scheduled for Monday 10/24/22 @ 11:00am excisional debridement of soft tissue and bone of left foot;       2. ESRD on HD  - HD as per nephrology     3. CAD s/p stent and CABG   - on statin   - Discuss with cardio. we're unable to hold aspirin. Cont aspirin   - holding plavix since 10/16  - continue BB  - Cardio consult appreciated. at moderate risk for surgery     4. DM II  - hypoglycemic this am  - Lantus  15 units HS     5.constipation   - Cont miralax daily   - Milk of Mg*1     6. Anemia of chronic disease due to ESKD s/p 1 PRBC preop   - transfuse 1 PRBC    7. Dysuria with pyuria  - U/a: pyuria   - Fluconazole 150 mg po*1 (received 10/24/2022)   - Urine cx:pending       Case Discussed with House Staff   30 minutes spent on total encounter; more than 50% of the visit was spent counseling and/or coordinating care by the attending physician.   Spectra [phone]     SCHWABACHER, LAWRENCE  62y  [facility]-N F3-4B 014 B      Patient is a 62y old  Male who presents with a chief complaint of Sepsis (19 Oct 2022 14:02)      INTERVAL HPI/OVERNIGHT EVENTS:    Patient feels well but still experiencing dysuria  constipation resolved  no other problems noted         FAMILY HISTORY:  Family history of heart disease (Father)    DM (diabetes mellitus) (Mother)    ESRD (end stage renal disease) on dialysis (Mother)      T(C): 35.8 (10-19-22 @ 12:58), Max: 36.3 (10-18-22 @ 21:14)  HR: 78 (10-19-22 @ 12:58) (69 - 79)  BP: 134/60 (10-19-22 @ 12:58) (134/60 - 167/86)  RR: 18 (10-19-22 @ 12:58) (18 - 18)  SpO2: 99% (10-19-22 @ 12:58) (99% - 99%)  Wt(kg): --Vital Signs Last 24 Hrs  T(C): 35.8 (19 Oct 2022 12:58), Max: 36.3 (18 Oct 2022 21:14)  T(F): 96.5 (19 Oct 2022 12:58), Max: 97.3 (18 Oct 2022 21:14)  HR: 78 (19 Oct 2022 12:58) (69 - 79)  BP: 134/60 (19 Oct 2022 12:58) (134/60 - 167/86)  BP(mean): --  RR: 18 (19 Oct 2022 12:58) (18 - 18)  SpO2: 99% (19 Oct 2022 12:58) (99% - 99%)    Parameters below as of 19 Oct 2022 12:58  Patient On (Oxygen Delivery Method): room air        PHYSICAL EXAM:  GENERAL: NAD, well-groomed, well-developed  PULM: Clear to auscultation bilaterally  CARDIAC: Regular rate and rhythm;  GI: Soft, Nontender, Nondistended; Bowel sounds present  EXTREMITIES:  left foot swollen and wrapped     Consultant(s) Notes Reviewed:  [x ] YES  [ ] NO  Care Discussed with Consultants/Other Providers [ x] YES  [ ] NO    LABS:                            7.4    11.48 )-----------( 389      ( 19 Oct 2022 09:20 )             22.7   10-19    133<L>  |  97<L>  |  53<H>  ----------------------------<  91  4.9   |  25  |  5.7<HH>    Ca    8.0<L>      19 Oct 2022 09:20  Phos  3.0     10-18  Mg     2.0     10-19    TPro  6.2  /  Alb  2.3<L>  /  TBili  0.2  /  DBili  x   /  AST  12  /  ALT  6   /  AlkPhos  143<H>  10-19            Culture - Abscess with Gram Stain (collected 16 Oct 2022 17:50)  Source: .Abscess left foot wound  Preliminary Report (18 Oct 2022 19:12):    Rare Morganella morganii    Rare Enterobacter cloacae complex    Rare Proteus vulgaris group    Rare Enterococcus faecium (vancomycin resistant)  Organism: Morganella morganii  Enterobacter cloacae complex  Proteus vulgaris group  Enterococcus faecium (vancomycin resistant) (18 Oct 2022 19:11)  Organism: Enterococcus faecium (vancomycin resistant) (18 Oct 2022 19:11)  Organism: Proteus vulgaris group (18 Oct 2022 19:05)  Organism: Enterobacter cloacae complex (18 Oct 2022 19:05)  Organism: Morganella morganii (18 Oct 2022 19:05)      acetaminophen     Tablet .. 650 milliGRAM(s) Oral every 6 hours PRN  aluminum hydroxide/magnesium hydroxide/simethicone Suspension 30 milliLiter(s) Oral every 4 hours PRN  aspirin enteric coated 81 milliGRAM(s) Oral daily  atorvastatin 40 milliGRAM(s) Oral at bedtime  darbepoetin Injectable Syringe 25 MICROGram(s) IV Push <User Schedule>  dextrose 10% Bolus 125 milliLiter(s) IV Bolus once  dextrose 5%. 1000 milliLiter(s) IV Continuous <Continuous>  dextrose 5%. 1000 milliLiter(s) IV Continuous <Continuous>  dextrose 5%. 1000 milliLiter(s) IV Continuous <Continuous>  dextrose 50% Injectable 25 Gram(s) IV Push once  dextrose 50% Injectable 12.5 Gram(s) IV Push once  dextrose 50% Injectable 25 Gram(s) IV Push once  dextrose Oral Gel 15 Gram(s) Oral once PRN  glucagon  Injectable 1 milliGRAM(s) IntraMuscular once  influenza   Vaccine 0.5 milliLiter(s) IntraMuscular once  insulin glargine Injectable (LANTUS) 15 Unit(s) SubCutaneous at bedtime  insulin lispro (ADMELOG) corrective regimen sliding scale   SubCutaneous three times a day before meals  melatonin 3 milliGRAM(s) Oral at bedtime PRN  meropenem  IVPB 500 milliGRAM(s) IV Intermittent every 24 hours  metoprolol tartrate 50 milliGRAM(s) Oral two times a day  ondansetron Injectable 4 milliGRAM(s) IV Push every 8 hours PRN  sevelamer carbonate 800 milliGRAM(s) Oral three times a day    61 y/o with a PMHx of DM, HTN, ESRD (on dialysis M/W/F, last session on 10/14), CAD s/p 1 stent on 2008 and quadruple CABG on 2012, PAD s/p bilateral angioplasty at [facility], was transferred from [facility] complaining of weakness and unhealed wound in the left foot.    1. 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 hx of PAD   - Cont meropenem 500mg IV daily   - IR for angiogram:  a) Left lower extremity angiogram demonstrating chronically occluded PT and AT with patent anterior tibial vein bypass reconstituting into the DP.   b) No microvascular outflow demonstrates within the calcaneous. No hemodynamically significant stenosis within the left SFA and popliteal artery. No intervention performed  - Blood cx:NTD   - MRI L foot done:  a) Osteomyelitis and erosion of the calcaneus with a vertical pathologic fracture extending to the subtalar joint. Gangrene of overlying subcutaneous tissues. 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  - ID consult appreciated :  a) Left Calcaneous OM s/p removal of FB 9/16  b)  Wound Cx 9/16 - Enterobacter cloacae. treated with two weeks post-HD vancomycin + cefepime  c)  wound CX 10/16 VRE Faecim, Enterobacter cloacae complex, Proteus vulgaris, Morganella morganii s/p debridement 10/21 Wound Cx Proteus mirabilis, VRE faecium, Pseudomonas putida  and s/p debridement 10/24  d) ESBL E coli Bacteremia at [facility]   - Podiatry consult:  a) Local Wound Care; Wound Flushed w/ NS; Wound Packed w/ xeroform/ ABD x2 / DSD / Kerlix / Ace bandage  b) Weight Bearing Status; WBAT w/ Heel Touch w/ Surgical Shoe;   c) Continue w/ Local Wound Care; Q24 Dressing Changes;  d) Surgery scheduled for Monday 10/24/22 @ 11:00am excisional debridement of soft tissue and bone of left foot;       2. ESRD on HD  - HD as per nephrology     3. CAD s/p stent and CABG   - on statin   - Discuss with cardio. we're unable to hold aspirin. Cont aspirin   - holding plavix since 10/16  - continue BB  - Cardio consult appreciated. at moderate risk for surgery     4. DM II  - hypoglycemic this am  - Lantus  15 units HS     5.constipation resolved   - Cont miralax daily   - Milk of Mg*1     6. Anemia of chronic disease due to ESKD s/p 1 PRBC preop   - transfuse 1 PRBC    7. Dysuria with pyuria  - U/a: pyuria   - Fluconazole 150 mg po*1 (received 10/24/2022)   - Urine cx:pending       Case Discussed with House Staff   30 minutes spent on total encounter; more than 50% of the visit was spent counseling and/or coordinating care by the attending physician.   Spectra [phone]     SCHWABACHER, LAWRENCE  62y  [facility]-N F3-4B 014 B      Patient is a 62y old  Male who presents with a chief complaint of Sepsis (19 Oct 2022 14:02)      INTERVAL HPI/OVERNIGHT EVENTS:    Patient feels well but still experiencing dysuria  constipation resolved  no other problems noted         FAMILY HISTORY:  Family history of heart disease (Father)    DM (diabetes mellitus) (Mother)    ESRD (end stage renal disease) on dialysis (Mother)      T(C): 35.8 (10-19-22 @ 12:58), Max: 36.3 (10-18-22 @ 21:14)  HR: 78 (10-19-22 @ 12:58) (69 - 79)  BP: 134/60 (10-19-22 @ 12:58) (134/60 - 167/86)  RR: 18 (10-19-22 @ 12:58) (18 - 18)  SpO2: 99% (10-19-22 @ 12:58) (99% - 99%)  Wt(kg): --Vital Signs Last 24 Hrs  T(C): 35.8 (19 Oct 2022 12:58), Max: 36.3 (18 Oct 2022 21:14)  T(F): 96.5 (19 Oct 2022 12:58), Max: 97.3 (18 Oct 2022 21:14)  HR: 78 (19 Oct 2022 12:58) (69 - 79)  BP: 134/60 (19 Oct 2022 12:58) (134/60 - 167/86)  BP(mean): --  RR: 18 (19 Oct 2022 12:58) (18 - 18)  SpO2: 99% (19 Oct 2022 12:58) (99% - 99%)    Parameters below as of 19 Oct 2022 12:58  Patient On (Oxygen Delivery Method): room air        PHYSICAL EXAM:  GENERAL: NAD, well-groomed, well-developed  PULM: Clear to auscultation bilaterally  CARDIAC: Regular rate and rhythm;  GI: Soft, Nontender, Nondistended; Bowel sounds present  EXTREMITIES:  left foot swollen and wrapped     Consultant(s) Notes Reviewed:  [x ] YES  [ ] NO  Care Discussed with Consultants/Other Providers [ x] YES  [ ] NO    LABS:                            7.4    11.48 )-----------( 389      ( 19 Oct 2022 09:20 )             22.7   10-19    133<L>  |  97<L>  |  53<H>  ----------------------------<  91  4.9   |  25  |  5.7<HH>    Ca    8.0<L>      19 Oct 2022 09:20  Phos  3.0     10-18  Mg     2.0     10-19    TPro  6.2  /  Alb  2.3<L>  /  TBili  0.2  /  DBili  x   /  AST  12  /  ALT  6   /  AlkPhos  143<H>  10-19            Culture - Abscess with Gram Stain (collected 16 Oct 2022 17:50)  Source: .Abscess left foot wound  Preliminary Report (18 Oct 2022 19:12):    Rare Morganella morganii    Rare Enterobacter cloacae complex    Rare Proteus vulgaris group    Rare Enterococcus faecium (vancomycin resistant)  Organism: Morganella morganii  Enterobacter cloacae complex  Proteus vulgaris group  Enterococcus faecium (vancomycin resistant) (18 Oct 2022 19:11)  Organism: Enterococcus faecium (vancomycin resistant) (18 Oct 2022 19:11)  Organism: Proteus vulgaris group (18 Oct 2022 19:05)  Organism: Enterobacter cloacae complex (18 Oct 2022 19:05)  Organism: Morganella morganii (18 Oct 2022 19:05)      acetaminophen     Tablet .. 650 milliGRAM(s) Oral every 6 hours PRN  aluminum hydroxide/magnesium hydroxide/simethicone Suspension 30 milliLiter(s) Oral every 4 hours PRN  aspirin enteric coated 81 milliGRAM(s) Oral daily  atorvastatin 40 milliGRAM(s) Oral at bedtime  darbepoetin Injectable Syringe 25 MICROGram(s) IV Push <User Schedule>  dextrose 10% Bolus 125 milliLiter(s) IV Bolus once  dextrose 5%. 1000 milliLiter(s) IV Continuous <Continuous>  dextrose 5%. 1000 milliLiter(s) IV Continuous <Continuous>  dextrose 5%. 1000 milliLiter(s) IV Continuous <Continuous>  dextrose 50% Injectable 25 Gram(s) IV Push once  dextrose 50% Injectable 12.5 Gram(s) IV Push once  dextrose 50% Injectable 25 Gram(s) IV Push once  dextrose Oral Gel 15 Gram(s) Oral once PRN  glucagon  Injectable 1 milliGRAM(s) IntraMuscular once  influenza   Vaccine 0.5 milliLiter(s) IntraMuscular once  insulin glargine Injectable (LANTUS) 15 Unit(s) SubCutaneous at bedtime  insulin lispro (ADMELOG) corrective regimen sliding scale   SubCutaneous three times a day before meals  melatonin 3 milliGRAM(s) Oral at bedtime PRN  meropenem  IVPB 500 milliGRAM(s) IV Intermittent every 24 hours  metoprolol tartrate 50 milliGRAM(s) Oral two times a day  ondansetron Injectable 4 milliGRAM(s) IV Push every 8 hours PRN  sevelamer carbonate 800 milliGRAM(s) Oral three times a day    63 y/o with a PMHx of DM, HTN, ESRD (on dialysis M/W/F, last session on 10/14), CAD s/p 1 stent on 2008 and quadruple CABG on 2012, PAD s/p bilateral angioplasty at [facility], was transferred from [facility] complaining of weakness and unhealed wound in the left foot.    1. 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 hx of PAD   - Cont meropenem 500mg IV daily.DApto added it by ID    - IR for angiogram:  a) Left lower extremity angiogram demonstrating chronically occluded PT and AT with patent anterior tibial vein bypass reconstituting into the DP.   b) No microvascular outflow demonstrates within the calcaneous. No hemodynamically significant stenosis within the left SFA and popliteal artery. No intervention performed  - Blood cx:NTD   - MRI L foot done:  a) Osteomyelitis and erosion of the calcaneus with a vertical pathologic fracture extending to the subtalar joint. Gangrene of overlying subcutaneous tissues. 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  - ID consult appreciated :  a) Left Calcaneous OM s/p removal of FB 9/16  b)  Wound Cx 9/16 - Enterobacter cloacae. treated with two weeks post-HD vancomycin + cefepime  c)  wound CX 10/16 VRE Faecim, Enterobacter cloacae complex, Proteus vulgaris, Morganella morganii s/p debridement 10/21 Wound Cx Proteus mirabilis, VRE faecium, Pseudomonas putida  and s/p debridement 10/24  d) ESBL E coli Bacteremia at [facility]   - Podiatry consult:  a) Local Wound Care; Wound Flushed w/ NS; Wound Packed w/ xeroform/ ABD x2 / DSD / Kerlix / Ace bandage  b) Weight Bearing Status; WBAT w/ Heel Touch w/ Surgical Shoe;   c) Continue w/ Local Wound Care; Q24 Dressing Changes;  d) Surgery scheduled for Monday 10/24/22 @ 11:00am excisional debridement of soft tissue and bone of left foot;       2. ESRD on HD  - HD as per nephrology     3. CAD s/p stent and CABG   - on statin   - Discuss with cardio. we're unable to hold aspirin. Cont aspirin   - holding plavix since 10/16  - continue BB  - Cardio consult appreciated. at moderate risk for surgery     4. DM II  - hypoglycemic this am  - Lantus  15 units HS     5.constipation resolved   - Cont miralax daily   - Milk of Mg*1     6. Anemia of chronic disease due to ESKD s/p 1 PRBC preop   - transfuse 1 PRBC    7. Dysuria with pyuria  - U/a: pyuria   - Fluconazole 150 mg po*1 (received 10/24/2022)   - Urine cx:pending       Case Discussed with House Staff   30 minutes spent on total encounter; more than 50% of the visit was spent counseling and/or coordinating care by the attending physician.   Spectra [phone]

## 2022-10-24 NOTE — PROGRESS NOTE ADULT - SUBJECTIVE AND OBJECTIVE BOX
SCHWABACHER, LAWRENCE  62y, Male  Allergy: No Known Allergies      LOS  10d    CHIEF COMPLAINT: Sepsis (24 Oct 2022 13:49)      INTERVAL EVENTS/HPI  - No acute events overnight  - T(F): , Max: 97.8 (10-24-22 @ 13:00)  - Denies any worsening symptoms  - Tolerating medication  - WBC Count: 10.50 (10-24-22 @ 07:25)  WBC Count: 8.97 (10-23-22 @ 10:17)     - Creatinine, Serum: 6.7 (10-24-22 @ 07:25)  Creatinine, Serum: 6.0 (10-23-22 @ 10:17)       ROS  General: Denies rigors, nightsweats  HEENT: Denies headache, rhinorrhea, sore throat, eye pain  CV: Denies CP, palpitations  PULM: Denies wheezing, hemoptysis  GI: Denies hematemesis, hematochezia, melena  : Denies discharge, hematuria  MSK: Denies arthralgias, myalgias  SKIN: Denies rash, lesions  NEURO: Denies paresthesias, weakness  PSYCH: Denies depression, anxiety    VITALS:  T(F): 97.6, Max: 97.8 (10-24-22 @ 13:00)  HR: 79  BP: 161/78  RR: 18Vital Signs Last 24 Hrs  T(C): 36.4 (24 Oct 2022 14:26), Max: 36.6 (24 Oct 2022 13:00)  T(F): 97.6 (24 Oct 2022 14:26), Max: 97.8 (24 Oct 2022 13:00)  HR: 79 (24 Oct 2022 14:45) (68 - 89)  BP: 161/78 (24 Oct 2022 14:45) (140/70 - 186/85)  BP(mean): 112 (24 Oct 2022 14:45) (105 - 122)  RR: 18 (24 Oct 2022 14:45) (15 - 18)  SpO2: 97% (24 Oct 2022 14:45) (96% - 100%)    Parameters below as of 24 Oct 2022 14:45  Patient On (Oxygen Delivery Method): nasal cannula  O2 Flow (L/min): 2      PHYSICAL EXAM:  Gen: NAD, resting in bed  HEENT: Normocephalic, atraumatic  Neck: supple, no lymphadenopathy  CV: Regular rate & regular rhythm  Lungs: decreased BS at bases, no fremitus  Abdomen: Soft, BS present  Ext: Warm, well perfused  Neuro: non focal, awake  Skin: no rash, no erythema  Lines: no phlebitis    FH: Non-contributory  Social Hx: Non-contributory    TESTS & MEASUREMENTS:                        7.1    10.50 )-----------( 347      ( 24 Oct 2022 07:25 )             22.3     10    136  |  96<L>  |  59<H>  ----------------------------<  130<H>  5.5<H>   |  28  |  6.7<HH>    Ca    8.0<L>      24 Oct 2022 07:25  Mg     2.1     10-24    TPro  6.7  /  Alb  2.5<L>  /  TBili  0.2  /  DBili  x   /  AST  12  /  ALT  6   /  AlkPhos  184<H>  10-24      LIVER FUNCTIONS - ( 24 Oct 2022 07:25 )  Alb: 2.5 g/dL / Pro: 6.7 g/dL / ALK PHOS: 184 U/L / ALT: 6 U/L / AST: 12 U/L / GGT: x           Urinalysis Basic - ( 23 Oct 2022 18:53 )    Color: Yellow / Appearance: Slightly Turbid / S.011 / pH: x  Gluc: x / Ketone: Negative  / Bili: Negative / Urobili: <2 mg/dL   Blood: x / Protein: 100 mg/dL / Nitrite: Negative   Leuk Esterase: Small / RBC: 6 /HPF / WBC 13 /HPF   Sq Epi: x / Non Sq Epi: 9 /HPF / Bacteria: Negative        Culture - Tissue with Gram Stain (collected 10-21-22 @ 14:05)  Source: .Tissue None  Gram Stain (10-22-22 @ 01:55):    No polymorphonuclear leukocytes seen per low power field    No organisms seen per oil power field  Preliminary Report (10-23-22 @ 19:47):    Moderate Proteus mirabilis    Rare Pseudomonas putida group    Rare Enterococcus faecium (vancomycin resistant)    Rare Staphylococcus haemolyticus  Organism: Proteus mirabilis  Pseudomonas putida group  Enterococcus faecium (vancomycin resistant)  Staphylococcus haemolyticus (10-23-22 @ 19:45)  Organism: Staphylococcus haemolyticus (10-23-22 @ 19:45)      -  Ampicillin/Sulbactam: R 16/8      -  Cefazolin: R >16      -  Clindamycin: R >4      -  Erythromycin: R >4      -  Gentamicin: R >8 Should not be used as monotherapy      -  Oxacillin: R >2      -  Penicillin: R >8      -  Rifampin: R >2 Should not be used as monotherapy      -  Tetra/Doxy: S <=1      -  Trimethoprim/Sulfamethoxazole: R >2/38      -  Vancomycin: S 4      Method Type: CONNER  Organism: Enterococcus faecium (vancomycin resistant) (10-23-22 @ 19:44)      -  Ampicillin: R >8 Predicts results to ampicillin/sulbactam, amoxacillin-clavulanate and  piperacillin-tazobactam.      -  Daptomycin: SDD 4 The breakpoint for SDD (sensitive dose dependent)is based on a dosage regimen of 8-12 mg/kg administered every 24 h in adults and is intended for serious infections due to E. faecium. Consultation with an infectious diseases specialist is recommended.      -  Levofloxacin: R >4      -  Linezolid: S 2      -  Tetra/Doxy: R >8      -  Vancomycin: R >16      Method Type: CONNER  Organism: Pseudomonas putida group (10-23-22 @ 19:44)      -  Amikacin: S <=16      -  Aztreonam: S <=4      -  Cefepime: S <=2      -  Ceftriaxone: S <=1      -  Ciprofloxacin: S <=0.25      -  Gentamicin: S <=2      -  Levofloxacin: S <=0.5      -  Meropenem: S <=1      -  Piperacillin/Tazobactam: S <=8      -  Tobramycin: S <=2      -  Trimethoprim/Sulfamethoxazole: S <=0.5/9.5      Method Type: CONNER  Organism: Proteus mirabilis (10-23-22 @ 19:43)      -  Amikacin: S <=16      -  Amoxicillin/Clavulanic Acid: S <=8/4      -  Ampicillin: S <=8 These ampicillin results predict results for amoxicillin      -  Ampicillin/Sulbactam: S <=4/2 Enterobacter, Klebsiella aerogenes, Citrobacter, and Serratia may develop resistance during prolonged therapy (3-4 days)      -  Aztreonam: S <=4      -  Cefazolin: S <=2 Enterobacter, Klebsiella aerogenes, Citrobacter, and Serratia may develop resistance during prolonged therapy (3-4 days)      -  Cefepime: S <=2      -  Cefoxitin: S <=8      -  Ceftriaxone: S <=1 Enterobacter, Klebsiella aerogenes, Citrobacter, and Serratia may develop resistance during prolonged therapy      -  Ciprofloxacin: S <=0.25      -  Ertapenem: S <=0.5      -  Gentamicin: S <=2      -  Levofloxacin: S <=0.5      -  Meropenem: S <=1      -  Piperacillin/Tazobactam: S <=8      -  Tobramycin: S <=2      -  Trimethoprim/Sulfamethoxazole: S <=0.5/9.5      Method Type: CONNER    Culture - Surgical Swab (collected 10-21-22 @ 14:04)  Source: .Surgical Swab None  Preliminary Report (10-23-22 @ 19:41):    Rare Proteus vulgaris group    Few Enterococcus faecium (vancomycin resistant)    Rare Pseudomonas putida group    Rare Coag Negative Staphylococcus "Susceptibilities not performed"  Organism: Proteus vulgaris group  Enterococcus faecium (vancomycin resistant)  Pseudomonas putida group (10-23-22 @ 19:40)  Organism: Pseudomonas putida group (10-23-22 @ 19:40)      -  Amikacin: S <=16      -  Aztreonam: S 8      -  Cefepime: S <=2      -  Ceftriaxone: S 8      -  Ciprofloxacin: S <=0.25      -  Gentamicin: S <=2      -  Levofloxacin: S <=0.5      -  Meropenem: S <=1      -  Piperacillin/Tazobactam: S <=8      -  Tobramycin: S <=2      -  Trimethoprim/Sulfamethoxazole: R >238      Method Type: CONNER  Organism: Enterococcus faecium (vancomycin resistant) (10-23-22 @ 19:40)      -  Ampicillin: R >8 Predicts results to ampicillin/sulbactam, amoxacillin-clavulanate and  piperacillin-tazobactam.      -  Daptomycin: SDD 4 The breakpoint for SDD (sensitive dose dependent)is based on a dosage regimen of 8-12 mg/kg administered every 24 h in adults and is intended for serious infections due to E. faecium. Consultation with an infectious diseases specialist is recommended.      -  Levofloxacin: R >4      -  Linezolid: S 2      -  Tetra/Doxy: R >8      -  Vancomycin: R >16      Method Type: CONNER  Organism: Proteus vulgaris group (10-23-22 @ 19:40)      -  Amikacin: S <=16      -  Amoxicillin/Clavulanic Acid: S <=8/4      -  Ampicillin: R <=8 These ampicillin results predict results for amoxicillin      -  Ampicillin/Sulbactam: S <=4/2 Enterobacter, Klebsiella aerogenes, Citrobacter, and Serratia may develop resistance during prolonged therapy (3-4 days)      -  Aztreonam: S <=4      -  Cefazolin: R <=2 Enterobacter, Klebsiella aerogenes, Citrobacter, and Serratia may develop resistance during prolonged therapy (3-4 days)      -  Cefepime: S <=2      -  Cefoxitin: S <=8      -  Ceftriaxone: S <=1 Enterobacter, Klebsiella aerogenes, Citrobacter, and Serratia may develop resistance during prolonged therapy      -  Ciprofloxacin: S <=0.25      -  Ertapenem: S <=0.5      -  Gentamicin: S <=2      -  Levofloxacin: S <=0.5      -  Meropenem: S <=1      -  Piperacillin/Tazobactam: S <=8      -  Tobramycin: S <=2      -  Trimethoprim/Sulfamethoxazole: S <=0.5/9.5      Method Type: CONNER    Culture - Abscess with Gram Stain (collected 10-16-22 @ 17:50)  Source: .Abscess left foot wound  Final Report (10-21-22 @ 18:00):    Rare Morganella morganii    Rare Enterobacter cloacae complex    Rare Proteus vulgaris group    Rare Enterococcus faecium (vancomycin resistant)  Organism: Morganella morganii  Enterobacter cloacae complex  Proteus vulgaris group  Enterococcus faecium (vancomycin resistant) (10-21-22 @ 18:00)  Organism: Enterococcus faecium (vancomycin resistant) (10-21-22 @ 18:00)      -  Ampicillin: R >8 Predicts results to ampicillin/sulbactam, amoxacillin-clavulanate and  piperacillin-tazobactam.      -  Daptomycin: SDD 4 The breakpoint for SDD (sensitive dose dependent)is based on a dosage regimen of 8-12 mg/kg administered every 24 h in adults and is intended for serious infections due to E. faecium. Consultation with an infectious diseases specialist is recommended.      -  Levofloxacin: R >4      -  Linezolid: S 1      -  Tetra/Doxy: R >8      -  Vancomycin: R >16      Method Type: CONNER  Organism: Proteus vulgaris group (10-21-22 @ 18:00)      -  Amikacin: S <=16      -  Amoxicillin/Clavulanic Acid: S <=8/4      -  Ampicillin: R >16 These ampicillin results predict results for amoxicillin      -  Ampicillin/Sulbactam: S <=4/2 Enterobacter, Klebsiella aerogenes, Citrobacter, and Serratia may develop resistance during prolonged therapy (3-4 days)      -  Aztreonam: S <=4      -  Cefazolin: R >16 Enterobacter, Klebsiella aerogenes, Citrobacter, and Serratia may develop resistance during prolonged therapy (3-4 days)      -  Cefepime: S <=2      -  Cefoxitin: S <=8      -  Ceftriaxone: S <=1 Enterobacter, Klebsiella aerogenes, Citrobacter, and Serratia may develop resistance during prolonged therapy      -  Ciprofloxacin: S <=0.25      -  Ertapenem: S <=0.5      -  Gentamicin: S <=2      -  Levofloxacin: S <=0.5      -  Meropenem: S <=1      -  Piperacillin/Tazobactam: S <=8      -  Tobramycin: S <=2      -  Trimethoprim/Sulfamethoxazole: S <=0.5/9.5      Method Type: CONNER  Organism: Enterobacter cloacae complex (10-21-22 @ 18:00)      -  Amikacin: S <=16      -  Amoxicillin/Clavulanic Acid: R 16/8      -  Ampicillin: R >16 These ampicillin results predict results for amoxicillin      -  Ampicillin/Sulbactam: R <=4/2 Enterobacter, Klebsiella aerogenes, Citrobacter, and Serratia may develop resistance during prolonged therapy (3-4 days)      -  Aztreonam: S 8      -  Cefazolin: R >16 Enterobacter, Klebsiella aerogenes, Citrobacter, and Serratia may develop resistance during prolonged therapy (3-4 days)      -  Cefepime: S <=2      -  Cefoxitin: R 16      -  Ceftriaxone: R >32 Enterobacter, Klebsiella aerogenes, Citrobacter, and Serratia may develop resistance during prolonged therapy      -  Ciprofloxacin: S <=0.25      -  Ertapenem: S <=0.5      -  Gentamicin: S <=2      -  Imipenem: S <=1      -  Levofloxacin: S <=0.5      -  Meropenem: S <=1      -  Piperacillin/Tazobactam: S <=8      -  Tobramycin: S <=2      -  Trimethoprim/Sulfamethoxazole: S       Method Type: CONNER  Organism: Morganella morganii (10-21-22 @ 18:00)      -  Amikacin: S <=16      -  Amoxicillin/Clavulanic Acid: R >16/8      -  Ampicillin: R >16 These ampicillin results predict results for amoxicillin      -  Ampicillin/Sulbactam: I 16 Enterobacter, Klebsiella aerogenes, Citrobacter, and Serratia may develop resistance during prolonged therapy (3-4 days)      -  Aztreonam: S <=4      -  Cefazolin: R >16 Enterobacter, Klebsiella aerogenes, Citrobacter, and Serratia may develop resistance during prolonged therapy (3-4 days)      -  Cefepime: S <=2      -  Cefoxitin: S <=8      -  Ceftriaxone: S <=1 Enterobacter, Klebsiella aerogenes, Citrobacter, and Serratia may develop resistance during prolonged therapy      -  Ciprofloxacin: S <=0.25      -  Ertapenem: S <=0.5      -  Gentamicin: S <=2      -  Imipenem: S <=1      -  Levofloxacin: S <=0.5      -  Meropenem: S <=1      -  Piperacillin/Tazobactam: S <=8      -  Tobramycin: S <=2      -  Trimethoprim/Sulfamethoxazole: S <=0.5/9.5      Method Type: CONNER    Culture - Blood (collected 10-15-22 @ 12:07)  Source: .Blood None  Final Report (10-20-22 @ 21:00):    No Growth Final            INFECTIOUS DISEASES TESTING  COVID-19 PCR: NotDetec (10-17-22 @ 20:19)  COVID-19 PCR: Detected (22 @ 12:50)  COVID-19 PCR: NotDetec (22 @ 13:45)  COVID-19 PCR: NotDetec (22 @ 01:35)  COVID-19 PCR: NotDetec (22 @ 14:15)  COVID-19 PCR: NotDetec (22 @ 15:30)  COVID-19 PCR: NotDetec (22 @ 11:50)  COVID-19 PCR: NotDetec (22 @ 10:25)      INFLAMMATORY MARKERS  C-Reactive Protein, Serum: 114.2 mg/L (10-16-22 @ 12:26)  Sedimentation Rate, Erythrocyte: 140 mm/Hr (10-16-22 @ 12:26)  Sedimentation Rate, Erythrocyte: 142 mm/Hr (10-15-22 @ 09:53)  C-Reactive Protein, Serum: 141.8 mg/L (10-15-22 @ 09:53)      RADIOLOGY & ADDITIONAL TESTS:  I have personally reviewed the last available Chest xray  CXR      CT      CARDIOLOGY TESTING  12 Lead ECG:   Ventricular Rate 86 BPM    Atrial Rate 86 BPM    P-R Interval 170 ms    QRS Duration 98 ms    Q-T Interval 412 ms    QTC Calculation(Bazett) 493 ms    P Axis 19 degrees    R Axis -19 degrees    T Axis 88 degrees    Diagnosis Line Normal sinus rhythm  Possible Anterior infarct , age undetermined  Abnormal ECG    Confirmed by Yusef Isaacs ([id number]) on 10/17/2022 9:10:37 AM (10-15-22 @ 09:56)      MEDICATIONS  aspirin enteric coated 81 Oral daily  atorvastatin 40 Oral at bedtime  Dakins Solution - 1/2 Strength 1 Topical daily  darbepoetin Injectable Syringe 25 IV Push <User Schedule>  glucagon  Injectable 1 IntraMuscular once  heparin   Injectable 5000 SubCutaneous every 12 hours  influenza   Vaccine 0.5 IntraMuscular once  insulin glargine Injectable (LANTUS) 12 SubCutaneous at bedtime  insulin lispro (ADMELOG) corrective regimen sliding scale  SubCutaneous three times a day before meals  meropenem  IVPB 500 IV Intermittent every 24 hours  metoprolol tartrate 50 Oral two times a day  polyethylene glycol 3350 17 Oral daily  senna 1 Oral two times a day  sevelamer carbonate 800 Oral three times a day  sodium chloride 0.9%. 1000 IV Continuous <Continuous>      WEIGHT  Weight (kg): 90.1 (10-24-22 @ 13:35)  Creatinine, Serum: 6.7 mg/dL (10-24-22 @ 07:25)      ANTIBIOTICS:  meropenem  IVPB 500 milliGRAM(s) IV Intermittent every 24 hours      All available historical records have been reviewed

## 2022-10-24 NOTE — PROGRESS NOTE ADULT - ASSESSMENT
61 y/o with a PMHx of DM, HTN, ESRD (on dialysis M/W/F, last session on 10/14), CAD s/p 1 stent on 2008 and quadruple CABG on 2012, PAD s/p bilateral angioplasty at [facility], was transferred from [facility] complaining of weakness and unhealed wound in the left foot. Patient was evaluated by vascular surgery and podiatry at [facility], who recommended debriding the L foot wound. Patient requested to be transferred to [facility] where his vascular surgeons are.    # HD TODAY 3H OPTI 160 UF 2.5 l / AVF   # on meropenem   # podiatry  and vascular notes appreciated / angio 10/20 - chronic occlusion of AT and PT and a patent bypass, no intervention performed s/p OR   # BP well controlled   # on binders/PH noted on binders  #  iron stores noted / elevated ferritin  / cont RAMSEY/ keep Hb >7   # check bladder sono check UA   # will follow

## 2022-10-24 NOTE — BRIEF OPERATIVE NOTE - COMMENTS
Will eval Tues 10/25; Plan for sx debridement and VAC application on Thurs 10/27;
Will need to monitor over the weekend; possible sx intervention next Monday 10/24; Will follow;

## 2022-10-25 LAB
ALBUMIN SERPL ELPH-MCNC: 2.8 G/DL — LOW (ref 3.5–5.2)
ALP SERPL-CCNC: 151 U/L — HIGH (ref 30–115)
ALT FLD-CCNC: 6 U/L — SIGNIFICANT CHANGE UP (ref 0–41)
ANION GAP SERPL CALC-SCNC: 11 MMOL/L — SIGNIFICANT CHANGE UP (ref 7–14)
AST SERPL-CCNC: 12 U/L — SIGNIFICANT CHANGE UP (ref 0–41)
BASOPHILS # BLD AUTO: 0.1 K/UL — SIGNIFICANT CHANGE UP (ref 0–0.2)
BASOPHILS NFR BLD AUTO: 1 % — SIGNIFICANT CHANGE UP (ref 0–1)
BILIRUB SERPL-MCNC: 0.3 MG/DL — SIGNIFICANT CHANGE UP (ref 0.2–1.2)
BUN SERPL-MCNC: 33 MG/DL — HIGH (ref 10–20)
CALCIUM SERPL-MCNC: 8.2 MG/DL — LOW (ref 8.4–10.5)
CHLORIDE SERPL-SCNC: 97 MMOL/L — LOW (ref 98–110)
CO2 SERPL-SCNC: 28 MMOL/L — SIGNIFICANT CHANGE UP (ref 17–32)
CREAT SERPL-MCNC: 4.3 MG/DL — CRITICAL HIGH (ref 0.7–1.5)
EGFR: 15 ML/MIN/1.73M2 — LOW
EOSINOPHIL # BLD AUTO: 0.22 K/UL — SIGNIFICANT CHANGE UP (ref 0–0.7)
EOSINOPHIL NFR BLD AUTO: 2.1 % — SIGNIFICANT CHANGE UP (ref 0–8)
GLUCOSE BLDC GLUCOMTR-MCNC: 113 MG/DL — HIGH (ref 70–99)
GLUCOSE BLDC GLUCOMTR-MCNC: 155 MG/DL — HIGH (ref 70–99)
GLUCOSE BLDC GLUCOMTR-MCNC: 180 MG/DL — HIGH (ref 70–99)
GLUCOSE BLDC GLUCOMTR-MCNC: 251 MG/DL — HIGH (ref 70–99)
GLUCOSE SERPL-MCNC: 108 MG/DL — HIGH (ref 70–99)
HCT VFR BLD CALC: 25.2 % — LOW (ref 42–52)
HGB BLD-MCNC: 8.4 G/DL — LOW (ref 14–18)
IMM GRANULOCYTES NFR BLD AUTO: 0.4 % — HIGH (ref 0.1–0.3)
LYMPHOCYTES # BLD AUTO: 1.12 K/UL — LOW (ref 1.2–3.4)
LYMPHOCYTES # BLD AUTO: 10.7 % — LOW (ref 20.5–51.1)
MAGNESIUM SERPL-MCNC: 2 MG/DL — SIGNIFICANT CHANGE UP (ref 1.8–2.4)
MCHC RBC-ENTMCNC: 31.9 PG — HIGH (ref 27–31)
MCHC RBC-ENTMCNC: 33.3 G/DL — SIGNIFICANT CHANGE UP (ref 32–37)
MCV RBC AUTO: 95.8 FL — HIGH (ref 80–94)
MONOCYTES # BLD AUTO: 0.64 K/UL — HIGH (ref 0.1–0.6)
MONOCYTES NFR BLD AUTO: 6.1 % — SIGNIFICANT CHANGE UP (ref 1.7–9.3)
NEUTROPHILS # BLD AUTO: 8.31 K/UL — HIGH (ref 1.4–6.5)
NEUTROPHILS NFR BLD AUTO: 79.7 % — HIGH (ref 42.2–75.2)
NRBC # BLD: 0 /100 WBCS — SIGNIFICANT CHANGE UP (ref 0–0)
PHOSPHATE SERPL-MCNC: 3.7 MG/DL — SIGNIFICANT CHANGE UP (ref 2.1–4.9)
PLATELET # BLD AUTO: 324 K/UL — SIGNIFICANT CHANGE UP (ref 130–400)
POTASSIUM SERPL-MCNC: 4.5 MMOL/L — SIGNIFICANT CHANGE UP (ref 3.5–5)
POTASSIUM SERPL-SCNC: 4.5 MMOL/L — SIGNIFICANT CHANGE UP (ref 3.5–5)
PROT SERPL-MCNC: 6.6 G/DL — SIGNIFICANT CHANGE UP (ref 6–8)
RBC # BLD: 2.63 M/UL — LOW (ref 4.7–6.1)
RBC # FLD: 15.2 % — HIGH (ref 11.5–14.5)
SODIUM SERPL-SCNC: 136 MMOL/L — SIGNIFICANT CHANGE UP (ref 135–146)
WBC # BLD: 10.43 K/UL — SIGNIFICANT CHANGE UP (ref 4.8–10.8)
WBC # FLD AUTO: 10.43 K/UL — SIGNIFICANT CHANGE UP (ref 4.8–10.8)

## 2022-10-25 PROCEDURE — 99232 SBSQ HOSP IP/OBS MODERATE 35: CPT | Mod: 24

## 2022-10-25 PROCEDURE — 99232 SBSQ HOSP IP/OBS MODERATE 35: CPT

## 2022-10-25 PROCEDURE — 93010 ELECTROCARDIOGRAM REPORT: CPT

## 2022-10-25 PROCEDURE — 76770 US EXAM ABDO BACK WALL COMP: CPT | Mod: 26

## 2022-10-25 RX ORDER — DAPTOMYCIN 500 MG/10ML
1000 INJECTION, POWDER, LYOPHILIZED, FOR SOLUTION INTRAVENOUS
Refills: 0 | Status: DISCONTINUED | OUTPATIENT
Start: 2022-10-25 | End: 2022-10-27

## 2022-10-25 RX ORDER — FLUCONAZOLE 150 MG/1
150 TABLET ORAL ONCE
Refills: 0 | Status: DISCONTINUED | OUTPATIENT
Start: 2022-10-25 | End: 2022-10-25

## 2022-10-25 RX ORDER — FLUCONAZOLE 150 MG/1
100 TABLET ORAL
Refills: 0 | Status: DISCONTINUED | OUTPATIENT
Start: 2022-10-25 | End: 2022-10-25

## 2022-10-25 RX ORDER — FLUCONAZOLE 150 MG/1
100 TABLET ORAL EVERY 24 HOURS
Refills: 0 | Status: DISCONTINUED | OUTPATIENT
Start: 2022-10-26 | End: 2022-10-27

## 2022-10-25 RX ORDER — FLUCONAZOLE 150 MG/1
200 TABLET ORAL DAILY
Refills: 0 | Status: DISCONTINUED | OUTPATIENT
Start: 2022-10-25 | End: 2022-10-25

## 2022-10-25 RX ADMIN — POLYETHYLENE GLYCOL 3350 17 GRAM(S): 17 POWDER, FOR SOLUTION ORAL at 13:33

## 2022-10-25 RX ADMIN — Medication 1 APPLICATION(S): at 08:22

## 2022-10-25 RX ADMIN — Medication 50 MILLIGRAM(S): at 17:25

## 2022-10-25 RX ADMIN — Medication 81 MILLIGRAM(S): at 13:30

## 2022-10-25 RX ADMIN — FLUCONAZOLE 200 MILLIGRAM(S): 150 TABLET ORAL at 13:30

## 2022-10-25 RX ADMIN — SENNA PLUS 1 TABLET(S): 8.6 TABLET ORAL at 17:34

## 2022-10-25 RX ADMIN — SEVELAMER CARBONATE 800 MILLIGRAM(S): 2400 POWDER, FOR SUSPENSION ORAL at 21:15

## 2022-10-25 RX ADMIN — HEPARIN SODIUM 5000 UNIT(S): 5000 INJECTION INTRAVENOUS; SUBCUTANEOUS at 17:26

## 2022-10-25 RX ADMIN — Medication 6: at 17:21

## 2022-10-25 RX ADMIN — SEVELAMER CARBONATE 800 MILLIGRAM(S): 2400 POWDER, FOR SUSPENSION ORAL at 05:07

## 2022-10-25 RX ADMIN — Medication 50 MILLIGRAM(S): at 05:08

## 2022-10-25 RX ADMIN — MEROPENEM 100 MILLIGRAM(S): 1 INJECTION INTRAVENOUS at 17:22

## 2022-10-25 RX ADMIN — DAPTOMYCIN 140 MILLIGRAM(S): 500 INJECTION, POWDER, LYOPHILIZED, FOR SOLUTION INTRAVENOUS at 20:48

## 2022-10-25 RX ADMIN — HEPARIN SODIUM 5000 UNIT(S): 5000 INJECTION INTRAVENOUS; SUBCUTANEOUS at 05:07

## 2022-10-25 RX ADMIN — Medication 2: at 12:00

## 2022-10-25 RX ADMIN — SENNA PLUS 1 TABLET(S): 8.6 TABLET ORAL at 05:08

## 2022-10-25 RX ADMIN — ATORVASTATIN CALCIUM 40 MILLIGRAM(S): 80 TABLET, FILM COATED ORAL at 21:14

## 2022-10-25 RX ADMIN — SEVELAMER CARBONATE 800 MILLIGRAM(S): 2400 POWDER, FOR SUSPENSION ORAL at 13:33

## 2022-10-25 RX ADMIN — INSULIN GLARGINE 12 UNIT(S): 100 INJECTION, SOLUTION SUBCUTANEOUS at 21:14

## 2022-10-25 NOTE — PROGRESS NOTE ADULT - SUBJECTIVE AND OBJECTIVE BOX
LAWRENCE SCHWABACHER 62y Male  MRN#: [id number]     Hospital Day: 11    Pt is currently admitted with the primary diagnosis of  BACTEREMIA        HOSPITAL COURSE:     63 y/o with a PMHx of DM, HTN, ESRD (on dialysis M/W/F), CAD s/p 1 stent () and 4x CABG (), PAD s/p bilateral angioplasty at [facility], was transferred from [facility] complaining of weakness and unhealed wound in the left foot.  Two months ago the patient inadvertently stepped on glass while cleaning his house injuring his left foot. His podiatrist removed glass fragments and sent him home on a week long course of antibiotics (patient unsure about which ABx). According to the patient the wound failed to improve, he progressively developed weakness on his legs, and recurring vomiting, which prompted him to go to [facility]'s ED 10/11/22. At [facility] patient was found to be septic and was started on IV Vancomycin 1000mg and Meropenem 500mg. Blood cultures grew ESBL E.Coli. An MRI of the foot showed "osteomyelitis of the calcaneus bone, with what appears to be a subacute fracture". Patient was evaluated by vascular surgery and podiatry at [facility], who recommended debriding the L foot wound. Patient requested to be transferred to [facility] where his vascular surgeons are. Pt underwent initial debridement 10/21, podiatry recommending second debridement 10/24/22. Continuing on meropenem.     SUBJECTIVE     Overnight events  None    Subjective complaints  Pt was evaluated at bedside. Pt denied active complaints.                                             ----------------------------------------------------------  OBJECTIVE  PAST MEDICAL & SURGICAL HISTORY  CAD (coronary artery disease)  1 stent     S/P CABG (coronary artery bypass graft)  x4    Diabetes mellitus    Transient ischemic attack (TIA)  2017;     Chronic kidney disease (CKD)  Stage IV    Hypertension    Stented coronary artery  in     Myocardial infarction  2012    PAD (peripheral artery disease)  S/p bypass left leg    HLD (hyperlipidemia)    BPH (benign prostatic hyperplasia)    Pain in left knee  s/p fall    OA (osteoarthritis)    [place] (hard of hearing)    Chronic anemia    S/P CABG (coronary artery bypass graft)      H/O arterial bypass of lower limb  Left Lower Extremity ()    History of surgery  Left CEA (2017)  Left Pinkie toe Amputation ()  CABG x 4 ()  Card cath - stent ()      AV fistula  2019  LEFT AV FISTULA                                              -----------------------------------------------------------  ALLERGIES:  No Known Allergies                                            ------------------------------------------------------------    HOME MEDICATIONS  Home Medications:  aspirin 81 mg oral tablet: 1 tab(s) orally once a day (14 Sep 2022 17:42)  furosemide 40 mg oral tablet: 2 tab(s) orally once a day (14 Sep 2022 17:42)  Levemir 100 units/mL subcutaneous solution: 40 unit(s) subcutaneous once a day (at bedtime) (14 Sep 2022 17:42)  Metoprolol Tartrate 50 mg oral tablet: 1 tab(s) orally 2 times a day (14 Sep 2022 17:42)  Plavix 75 mg oral tablet: 1 tab(s) orally once a day (14 Sep 2022 17:42)  sevelamer carbonate 800 mg oral tablet: 1 tab(s) orally 3 times a day (with meals) (20 Sep 2022 11:36)  Trulicity Pen 1.5 mg/0.5 mL subcutaneous solution: 1.5  subcutaneous once a week (14 Sep 2022 17:42)                           MEDICATIONS:  STANDING MEDICATIONS  aspirin enteric coated 81 milliGRAM(s) Oral daily  atorvastatin 40 milliGRAM(s) Oral at bedtime  Dakins Solution - 1/2 Strength 1 Application(s) Topical daily  darbepoetin Injectable Syringe 25 MICROGram(s) IV Push <User Schedule>  glucagon  Injectable 1 milliGRAM(s) IntraMuscular once  heparin   Injectable 5000 Unit(s) SubCutaneous every 12 hours  influenza   Vaccine 0.5 milliLiter(s) IntraMuscular once  insulin glargine Injectable (LANTUS) 12 Unit(s) SubCutaneous at bedtime  insulin lispro (ADMELOG) corrective regimen sliding scale   SubCutaneous three times a day before meals  meropenem  IVPB 500 milliGRAM(s) IV Intermittent every 24 hours  metoprolol tartrate 50 milliGRAM(s) Oral two times a day  polyethylene glycol 3350 17 Gram(s) Oral daily  senna 1 Tablet(s) Oral two times a day  sevelamer carbonate 800 milliGRAM(s) Oral three times a day    PRN MEDICATIONS  acetaminophen     Tablet .. 650 milliGRAM(s) Oral every 6 hours PRN  aluminum hydroxide/magnesium hydroxide/simethicone Suspension 30 milliLiter(s) Oral every 4 hours PRN  melatonin 3 milliGRAM(s) Oral at bedtime PRN  ondansetron Injectable 4 milliGRAM(s) IV Push every 8 hours PRN                                            ------------------------------------------------------------  VITAL SIGNS: Last 24 Hours  T(C): 36.4 (25 Oct 2022 05:09), Max: 36.6 (24 Oct 2022 13:00)  T(F): 97.6 (25 Oct 2022 05:09), Max: 97.8 (24 Oct 2022 13:00)  HR: 79 (25 Oct 2022 05:09) (68 - 91)  BP: 170/76 (25 Oct 2022 05:09) (140/70 - 188/77)  BP(mean): 113 (24 Oct 2022 15:30) (105 - 126)  RR: 18 (25 Oct 2022 05:09) (15 - 18)  SpO2: 96% (24 Oct 2022 15:30) (96% - 100%)      10-24-22 @ 07:01  -  10-25-22 @ 06:04  --------------------------------------------------------  IN: 315 mL / OUT: 3000 mL / NET: -2685 mL                                             --------------------------------------------------------------  LABS:                        7.1    10.50 )-----------( 347      ( 24 Oct 2022 07:25 )             22.3     10-24    136  |  96<L>  |  59<H>  ----------------------------<  130<H>  5.5<H>   |  28  |  6.7<HH>    Ca    8.0<L>      24 Oct 2022 07:25  Mg     2.1     10-24    TPro  6.7  /  Alb  2.5<L>  /  TBili  0.2  /  DBili  x   /  AST  12  /  ALT  6   /  AlkPhos  184<H>  10-24    PT/INR - ( 23 Oct 2022 10:17 )   PT: 12.60 sec;   INR: 1.10 ratio         PTT - ( 23 Oct 2022 10:17 )  PTT:32.9 sec  Urinalysis Basic - ( 23 Oct 2022 18:53 )    Color: Yellow / Appearance: Slightly Turbid / S.011 / pH: x  Gluc: x / Ketone: Negative  / Bili: Negative / Urobili: <2 mg/dL   Blood: x / Protein: 100 mg/dL / Nitrite: Negative   Leuk Esterase: Small / RBC: 6 /HPF / WBC 13 /HPF   Sq Epi: x / Non Sq Epi: 9 /HPF / Bacteria: Negative                                                            -------------------------------------------------------------  RADIOLOGY:                                            --------------------------------------------------------------    PHYSICAL EXAM:  GENERAL: NAD, lying in bed comfortably  HEENT:  Atraumatic, Normocephalic  NECK: Supple, trachea midline  CHEST/LUNG: BL BS CTA. Unlabored respirations  HEART: S1S2 audible; RRR,  ABDOMEN: Soft, Nontender, Nondistended.    EXTREMITIES: Warm, LLE swelling  NERVOUS SYSTEM:  Awake and alert.   SKIN: left foot swollen and wrapped                                            --------------------------------------------------------------                 LAWRENCE SCHWABACHER 62y Male  MRN#: [id number]     Hospital Day: 11    Pt is currently admitted with the primary diagnosis of  BACTEREMIA    HOSPITAL COURSE:     63 y/o with a PMHx of DM, HTN, ESRD (on dialysis M/W/F), CAD s/p 1 stent () and 4x CABG (), PAD s/p bilateral angioplasty at [facility], was transferred from [facility] complaining of weakness and unhealed wound in the left foot.  Two months ago the patient inadvertently stepped on glass while cleaning his house injuring his left foot. His podiatrist removed glass fragments and sent him home on a week long course of antibiotics (patient unsure about which ABx). According to the patient the wound failed to improve, he progressively developed weakness on his legs, and recurring vomiting, which prompted him to go to [facility]'s ED 10/11/22. At [facility] patient was found to be septic and was started on IV Vancomycin 1000mg and Meropenem 500mg. Blood cultures grew ESBL E.Coli. An MRI of the foot showed "osteomyelitis of the calcaneus bone, with what appears to be a subacute fracture". Patient was evaluated by vascular surgery and podiatry at [facility], who recommended debriding the L foot wound. Patient requested to be transferred to [facility] where his vascular surgeons are. Pt underwent initial debridement 10/21,  second debridement 10/24/22. Continuing on meropenem. Added daptomycin for VRE coverage. Added fluconazole as patient is having significant dysuria. Patient is to have vascular procedure for occluded segment 10/27/22 with third DFU debridement to follow.     SUBJECTIVE     Overnight events  None    Subjective complaints  Pt was evaluated at bedside. Pt denied active complaints.                                             ----------------------------------------------------------  OBJECTIVE  PAST MEDICAL & SURGICAL HISTORY  CAD (coronary artery disease)  1 stent     S/P CABG (coronary artery bypass graft)  x4    Diabetes mellitus    Transient ischemic attack (TIA)  2008    Chronic kidney disease (CKD)  Stage IV    Hypertension    Stented coronary artery  in     Myocardial infarction      PAD (peripheral artery disease)  S/p bypass left leg    HLD (hyperlipidemia)    BPH (benign prostatic hyperplasia)    Pain in left knee  s/p fall    OA (osteoarthritis)    [place] (hard of hearing)    Chronic anemia    S/P CABG (coronary artery bypass graft)      H/O arterial bypass of lower limb  Left Lower Extremity ()    History of surgery  Left CEA ()  Left Pinkie toe Amputation ()  CABG x 4 ()  Card cath - stent ()      AV fistula  2019  LEFT AV FISTULA                                              -----------------------------------------------------------  ALLERGIES:  No Known Allergies                                            ------------------------------------------------------------    HOME MEDICATIONS  Home Medications:  aspirin 81 mg oral tablet: 1 tab(s) orally once a day (14 Sep 2022 17:42)  furosemide 40 mg oral tablet: 2 tab(s) orally once a day (14 Sep 2022 17:42)  Levemir 100 units/mL subcutaneous solution: 40 unit(s) subcutaneous once a day (at bedtime) (14 Sep 2022 17:42)  Metoprolol Tartrate 50 mg oral tablet: 1 tab(s) orally 2 times a day (14 Sep 2022 17:42)  Plavix 75 mg oral tablet: 1 tab(s) orally once a day (14 Sep 2022 17:42)  sevelamer carbonate 800 mg oral tablet: 1 tab(s) orally 3 times a day (with meals) (20 Sep 2022 11:36)  Trulicity Pen 1.5 mg/0.5 mL subcutaneous solution: 1.5  subcutaneous once a week (14 Sep 2022 17:42)                           MEDICATIONS:  STANDING MEDICATIONS  aspirin enteric coated 81 milliGRAM(s) Oral daily  atorvastatin 40 milliGRAM(s) Oral at bedtime  Dakins Solution - 1/2 Strength 1 Application(s) Topical daily  darbepoetin Injectable Syringe 25 MICROGram(s) IV Push <User Schedule>  glucagon  Injectable 1 milliGRAM(s) IntraMuscular once  heparin   Injectable 5000 Unit(s) SubCutaneous every 12 hours  influenza   Vaccine 0.5 milliLiter(s) IntraMuscular once  insulin glargine Injectable (LANTUS) 12 Unit(s) SubCutaneous at bedtime  insulin lispro (ADMELOG) corrective regimen sliding scale   SubCutaneous three times a day before meals  meropenem  IVPB 500 milliGRAM(s) IV Intermittent every 24 hours  metoprolol tartrate 50 milliGRAM(s) Oral two times a day  polyethylene glycol 3350 17 Gram(s) Oral daily  senna 1 Tablet(s) Oral two times a day  sevelamer carbonate 800 milliGRAM(s) Oral three times a day    PRN MEDICATIONS  acetaminophen     Tablet .. 650 milliGRAM(s) Oral every 6 hours PRN  aluminum hydroxide/magnesium hydroxide/simethicone Suspension 30 milliLiter(s) Oral every 4 hours PRN  melatonin 3 milliGRAM(s) Oral at bedtime PRN  ondansetron Injectable 4 milliGRAM(s) IV Push every 8 hours PRN                                            ------------------------------------------------------------  VITAL SIGNS: Last 24 Hours  T(C): 36.4 (25 Oct 2022 05:09), Max: 36.6 (24 Oct 2022 13:00)  T(F): 97.6 (25 Oct 2022 05:09), Max: 97.8 (24 Oct 2022 13:00)  HR: 79 (25 Oct 2022 05:09) (68 - 91)  BP: 170/76 (25 Oct 2022 05:09) (140/70 - 188/77)  BP(mean): 113 (24 Oct 2022 15:30) (105 - 126)  RR: 18 (25 Oct 2022 05:09) (15 - 18)  SpO2: 96% (24 Oct 2022 15:30) (96% - 100%)      10-24-22 @ 07:01  -  10-25-22 @ 06:04  --------------------------------------------------------  IN: 315 mL / OUT: 3000 mL / NET: -2685 mL                                             --------------------------------------------------------------  LABS:                        7.1    10.50 )-----------( 347      ( 24 Oct 2022 07:25 )             22.3     10    136  |  96<L>  |  59<H>  ----------------------------<  130<H>  5.5<H>   |  28  |  6.7<HH>    Ca    8.0<L>      24 Oct 2022 07:25  Mg     2.1     10-24    TPro  6.7  /  Alb  2.5<L>  /  TBili  0.2  /  DBili  x   /  AST  12  /  ALT  6   /  AlkPhos  184<H>  10-24    PT/INR - ( 23 Oct 2022 10:17 )   PT: 12.60 sec;   INR: 1.10 ratio         PTT - ( 23 Oct 2022 10:17 )  PTT:32.9 sec  Urinalysis Basic - ( 23 Oct 2022 18:53 )    Color: Yellow / Appearance: Slightly Turbid / S.011 / pH: x  Gluc: x / Ketone: Negative  / Bili: Negative / Urobili: <2 mg/dL   Blood: x / Protein: 100 mg/dL / Nitrite: Negative   Leuk Esterase: Small / RBC: 6 /HPF / WBC 13 /HPF   Sq Epi: x / Non Sq Epi: 9 /HPF / Bacteria: Negative                                                            -------------------------------------------------------------  RADIOLOGY:                                            --------------------------------------------------------------    PHYSICAL EXAM:  GENERAL: NAD, lying in bed comfortably  HEENT:  Atraumatic, Normocephalic  NECK: Supple, trachea midline  CHEST/LUNG: BL BS CTA. Unlabored respirations  HEART: S1S2 audible; RRR,  ABDOMEN: Soft, Nontender, Nondistended.    EXTREMITIES: Warm, LLE swelling  NERVOUS SYSTEM:  Awake and alert.   SKIN: left foot swollen and wrapped                                            --------------------------------------------------------------

## 2022-10-25 NOTE — PROGRESS NOTE ADULT - SUBJECTIVE AND OBJECTIVE BOX
Podiatry Progress Note    Subjective:  SCHWABACHER, LAWRENCE is a  62y Male.   Seen bedside.   Patient is a 62y old  Male who presents with a chief complaint of Sepsis (25 Oct 2022 07:43)      Past Medical History and Surgical History  PAST MEDICAL & SURGICAL HISTORY:  CAD (coronary artery disease)  1 stent 2008      S/P CABG (coronary artery bypass graft)  x4      Diabetes mellitus      Transient ischemic attack (TIA)  2017; 2008      Chronic kidney disease (CKD)  Stage IV      Hypertension      Stented coronary artery  in 2008      Myocardial infarction  2012      PAD (peripheral artery disease)  S/p bypass left leg      HLD (hyperlipidemia)      BPH (benign prostatic hyperplasia)      Pain in left knee  s/p fall      OA (osteoarthritis)      [place] (hard of hearing)      Chronic anemia      S/P CABG (coronary artery bypass graft)  2012      H/O arterial bypass of lower limb  Left Lower Extremity (2016)      History of surgery  Left CEA (2017)  Left Pinkie toe Amputation (2014)  CABG x 4 (2012)  Card cath - stent (2008)        AV fistula  2019  LEFT AV FISTULA           Objective:  Vital Signs Last 24 Hrs  T(C): 36.4 (25 Oct 2022 05:09), Max: 36.6 (24 Oct 2022 13:00)  T(F): 97.6 (25 Oct 2022 05:09), Max: 97.8 (24 Oct 2022 13:00)  HR: 79 (25 Oct 2022 05:09) (68 - 91)  BP: 170/76 (25 Oct 2022 05:09) (140/70 - 188/77)  BP(mean): 113 (24 Oct 2022 15:30) (105 - 126)  RR: 18 (25 Oct 2022 05:09) (15 - 18)  SpO2: 96% (24 Oct 2022 15:30) (96% - 100%)    Parameters below as of 24 Oct 2022 15:30  Patient On (Oxygen Delivery Method): room air                            8.4    10.43 )-----------( 324      ( 25 Oct 2022 07:24 )             25.2                 10-24    136  |  96<L>  |  59<H>  ----------------------------<  130<H>  5.5<H>   |  28  |  6.7<HH>    Ca    8.0<L>      24 Oct 2022 07:25  Mg     2.1     10-24    TPro  6.7  /  Alb  2.5<L>  /  TBili  0.2  /  DBili  x   /  AST  12  /  ALT  6   /  AlkPhos  184<H>  10-24      #Left Lower Extremity  Derm:   Open Left Plantar Ulcer @ Plantar Medial Rearfoot;    -Wound Base Fibrogranular; 50% Fibrous, 50% Granular; moderate serosanguinous drainage    -Wound Margins Irregular  Mild Cellulitis w/ Erythema of Left Lower Extremity;  Noted mild increase in periwound erythema  Vascular: DP and PT Pulses Diminished; Foot to Warm to the touch; Capillary Refill Delayed to the Toes;  Neuro: Protective Sensation Diminished / Moderately Neuropathic   MSK: Mild Charcot Deformity Left Foot; No Pain On Palpation at Wound Site     Assessment:  S/p Excisional Debridement of Soft Tissue & Bone, Left Foot; (DOS: 10/21/22)   s/p exc dbx st/b Left foot 10/24/22    Plan:  Chart reviewed and Patient evaluated. All Questions and Concerns Addressed and Answered  Local Wound Care; Wound Flushed w/ NS; Wound Packed w/ adaptic/ DSD/Kerlix/ACE  Weight Bearing Status; WBAT w/ toe touch w/surgical shoe left foot  Surgical shoe ordered to be dispensed left foot  Ordered Dakins to be applied for daily dressing changes wet to dry   Continue w/ Local Wound Care; Q24 Dressing Changes as stated above  Plan for surgical debridement with wound VAC application early next week after vascular procedure  Will update with final time and date when scheduled  Patient is aware of plan for anther debridement; amenable at this time  Discussed Plan w/ Attending Dr. Garvin     Podiatry

## 2022-10-25 NOTE — PROGRESS NOTE ADULT - ASSESSMENT
61 y/o with a PMHx of DM, HTN, ESRD (on dialysis M/W/F, last session on 10/14), CAD s/p 1 stent on 2008 and quadruple CABG on 2012, PAD, s/p left pop-AT rGSV bypass 2020, s/p bilateral angioplasty at [facility], was transferred from [facility] complaining of weakness and unhealed wound in the left foot.  Vascular surgery consulted for L nonhealing foot ulcer with history of PAD.  Patient has intact, dopplerable signals in the LLE. In 9/15, patient had no evidence of arterial stenosis in the LLE.  Went with IR 10/20: s/p Left lower extremity angiogram demonstrating chronically occluded PT and AT with patent anterior tibial vein bypass reconstituting into the DP. No microvascular outflow demonstrates within the calcaneous. No hemodynamically significant stenosis within the left SFA and popliteal artery. No intervention performed      Plan:  - I reviewed labs  - I reviewed radiology imagings  - I personally visualized the imagings  - I discussed the findings and imagings with Dr. Malik:  - for another attempt left lower extremity angiogram with endovascular revascularization via tibial stick - scheduled Thursday 10/27 with Dr. Malik  pre-op labs for Wed  needs COVID test Wed    SPECTRA [id number]   63 y/o with a PMHx of DM, HTN, ESRD (on dialysis M/W/F, last session on 10/14), CAD s/p 1 stent on 2008 and quadruple CABG on 2012, PAD, s/p left pop-AT rGSV bypass 2020, s/p bilateral angioplasty at [facility], was transferred from [facility] complaining of weakness and unhealed wound in the left foot.  Vascular surgery consulted for L nonhealing foot ulcer with history of PAD.  Patient has intact, dopplerable signals in the LLE. In 9/15, patient had no evidence of arterial stenosis in the LLE.  Went with IR 10/20: s/p Left lower extremity angiogram demonstrating chronically occluded PT and AT with patent anterior tibial vein bypass reconstituting into the DP. No microvascular outflow demonstrates within the calcaneous. No hemodynamically significant stenosis within the left SFA and popliteal artery. No intervention performed      Plan:  - I reviewed labs  - I reviewed radiology imagings  - I personally visualized the imagings  - I discussed the findings and imagings with Dr. Malik:  - for another attempt left lower extremity angiogram with endovascular revascularization via tibial stick - scheduled Thursday 10/27 with Dr. Malik  - PLEASE OBTAIN VEIN MAPPING AND CARDIOLOGY CLEARANCE FOR POSSIBLE BYPASS ON THURSDAY   pre-op labs for Wed  needs COVID test Wed    SPECTRA [id number]

## 2022-10-25 NOTE — PROGRESS NOTE ADULT - SUBJECTIVE AND OBJECTIVE BOX
seen and examined  24 h events noted   no distress   lying comfortable         PAST HISTORY  --------------------------------------------------------------------------------  No significant changes to PMH, PSH, FHx, SHx, unless otherwise noted    ALLERGIES & MEDICATIONS  --------------------------------------------------------------------------------  Allergies    No Known Allergies    Intolerances      Standing Inpatient Medications  aspirin enteric coated 81 milliGRAM(s) Oral daily  atorvastatin 40 milliGRAM(s) Oral at bedtime  Dakins Solution - 1/2 Strength 1 Application(s) Topical daily  darbepoetin Injectable Syringe 25 MICROGram(s) IV Push <User Schedule>  glucagon  Injectable 1 milliGRAM(s) IntraMuscular once  heparin   Injectable 5000 Unit(s) SubCutaneous every 12 hours  influenza   Vaccine 0.5 milliLiter(s) IntraMuscular once  insulin glargine Injectable (LANTUS) 12 Unit(s) SubCutaneous at bedtime  insulin lispro (ADMELOG) corrective regimen sliding scale   SubCutaneous three times a day before meals  meropenem  IVPB 500 milliGRAM(s) IV Intermittent every 24 hours  metoprolol tartrate 50 milliGRAM(s) Oral two times a day  polyethylene glycol 3350 17 Gram(s) Oral daily  senna 1 Tablet(s) Oral two times a day  sevelamer carbonate 800 milliGRAM(s) Oral three times a day    PRN Inpatient Medications  acetaminophen     Tablet .. 650 milliGRAM(s) Oral every 6 hours PRN  aluminum hydroxide/magnesium hydroxide/simethicone Suspension 30 milliLiter(s) Oral every 4 hours PRN  melatonin 3 milliGRAM(s) Oral at bedtime PRN  ondansetron Injectable 4 milliGRAM(s) IV Push every 8 hours PRN        VITALS/PHYSICAL EXAM  --------------------------------------------------------------------------------  T(C): 36.4 (10-25-22 @ 05:09), Max: 36.6 (10-24-22 @ 13:00)  HR: 79 (10-25-22 @ 05:09) (68 - 91)  BP: 170/76 (10-25-22 @ 05:09) (140/70 - 188/77)  RR: 18 (10-25-22 @ 05:09) (15 - 18)  SpO2: 96% (10-24-22 @ 15:30) (96% - 100%)  Wt(kg): --  Height (cm): 175.3 (10-24-22 @ 13:35)  Weight (kg): 90.1 (10-24-22 @ 13:35)  BMI (kg/m2): 29.3 (10-24-22 @ 13:35)  BSA (m2): 2.06 (10-24-22 @ 13:35)      10-24-22 @ 07:01  -  10-25-22 @ 07:00  --------------------------------------------------------  IN: 315 mL / OUT: 3000 mL / NET: -2685 mL      Physical Exam:  	Gen: NAD,  	Pulm: CTA B/L  	CV: S1S2; no rub  	Abd:  soft, nontender/nondistended  	Vascular access:av     LABS/STUDIES  --------------------------------------------------------------------------------              7.1    10.50 >-----------<  347      [10-24-22 @ 07:25]              22.3     136  |  96  |  59  ----------------------------<  130      [10-24-22 @ 07:25]  5.5   |  28  |  6.7        Ca     8.0     [10-24-22 @ 07:25]      Mg     2.1     [10-24-22 @ 07:25]    TPro  6.7  /  Alb  2.5  /  TBili  0.2  /  DBili  x   /  AST  12  /  ALT  6   /  AlkPhos  184  [10-24-22 @ 07:25]    PT/INR: PT 12.60, INR 1.10       [10-23-22 @ 10:17]  PTT: 32.9       [10-23-22 @ 10:17]    Creatinine Trend:  SCr 6.7 [10-24 @ 07:25]  SCr 6.0 [10-23 @ 10:17]  SCr 4.9 [10-22 @ 09:52]  SCr 5.8 [10-21 @ 10:30]  SCr 5.9 [10-21 @ 07:57]    Urinalysis - [10-23-22 @ 18:53]      Color Yellow / Appearance Slightly Turbid / SG 1.011 / pH 8.5      Gluc 200 mg/dL / Ketone Negative  / Bili Negative / Urobili <2 mg/dL       Blood Trace / Protein 100 mg/dL / Leuk Est Small / Nitrite Negative      RBC 6 / WBC 13 / Hyaline 2 / Gran  / Sq Epi  / Non Sq Epi 9 / Bacteria Negative      Iron 39, TIBC 133, %sat 29      [10-21-22 @ 10:30]  Ferritin 1126      [10-21-22 @ 10:30]  PTH -- (Ca 7.5)      [02-26-22 @ 16:00]   312  Vitamin D (25OH) 21      [02-26-22 @ 16:00]  HbA1c 5.8      [01-30-20 @ 06:46]  Lipid: chol 81, , HDL 22, LDL --      [10-15-22 @ 09:53]

## 2022-10-25 NOTE — PROGRESS NOTE ADULT - SUBJECTIVE AND OBJECTIVE BOX
VASCULAR SURGERY PROGRESS NOTE    CC: sepsis, dfu      Procedure: 10/20 with IR: s/p Left lower extremity angiogram demonstrating chronically occluded PT and AT with patent anterior tibial vein bypass reconstituting into the DP. No microvascular outflow demonstrates within the calcaneous. No hemodynamically significant stenosis within the left SFA and popliteal artery. No intervention performed    Events of past 24 hours: requires re-vascularization prior to podiatry procedure          ROS otherwise negative except per subjective and HPI      PAST MEDICAL & SURGICAL HISTORY:  CAD (coronary artery disease)  1 stent       S/P CABG (coronary artery bypass graft)  x4      Diabetes mellitus      Transient ischemic attack (TIA)  2017;       Chronic kidney disease (CKD)  Stage IV      Hypertension      Stented coronary artery  in       Myocardial infarction        PAD (peripheral artery disease)  S/p bypass left leg      HLD (hyperlipidemia)      BPH (benign prostatic hyperplasia)      Pain in left knee  s/p fall      OA (osteoarthritis)      [place] (hard of hearing)      Chronic anemia      S/P CABG (coronary artery bypass graft)        H/O arterial bypass of lower limb  Left Lower Extremity ()      History of surgery  Left CEA ()  Left Pinkie toe Amputation ()  CABG x 4 ()  Card cath - stent ()        AV fistula  2019  LEFT AV FISTULA          Vital Signs Last 24 Hrs  T(C): 35.8 (25 Oct 2022 13:14), Max: 36.5 (24 Oct 2022 21:20)  T(F): 96.5 (25 Oct 2022 13:14), Max: 97.7 (24 Oct 2022 21:20)  HR: 81 (25 Oct 2022 13:14) (77 - 91)  BP: 151/67 (25 Oct 2022 13:14) (140/70 - 188/77)  BP(mean): 113 (24 Oct 2022 15:30) (105 - 126)  RR: 18 (25 Oct 2022 13:14) (17 - 18)  SpO2: 96% (24 Oct 2022 15:30) (96% - 100%)    Parameters below as of 24 Oct 2022 15:30  Patient On (Oxygen Delivery Method): room air        Pain (0-10):            Pain Control Adequate: [] YES [] N    Diet:    I&O's Detail    24 Oct 2022 07:01  -  25 Oct 2022 07:00  --------------------------------------------------------  IN:    PRBCs (Packed Red Blood Cells): 315 mL  Total IN: 315 mL    OUT:    Other (mL): 3000 mL  Total OUT: 3000 mL    Total NET: -2685 mL      25 Oct 2022 07:01  -  25 Oct 2022 13:30  --------------------------------------------------------  IN:    Oral Fluid: 180 mL  Total IN: 180 mL    OUT:  Total OUT: 0 mL    Total NET: 180 mL          Bowel Movement: : [] YES [] NO  Flatus: : [] YES [] NO    PHYSICAL EXAM    Appearance: Normal	  HEENT:   Normal oral mucosa, PERRL, EOMI	  Neck: Supple, - JVD; Carotid Bruit   Cardiovascular: Normal S1 S2, No JVD, No murmurs,   Respiratory: Lungs clear to auscultation/Decreased Breath Sounds/No Rales, Rhonchi, Wheezing	  Gastrointestinal:  Soft, Non-tender, + BS	  Skin: No rashes, No ecchymoses, No cyanosis  Extremities: Normal range of motion, No clubbing, cyanosis   Open Left Plantar Ulcer @ Plantar Medial Rearfoot;  Neurologic: Non-focal  Psychiatry: A & O x 3, Mood & affect appropriate    PULSES:  Femoral:  Popliteal:  Dorsal Pedal: dopplerable b/l  Posterior Tibial: dopplerable b/l  Capillary:      MEDICATIONS:   MEDICATIONS  (STANDING):  aspirin enteric coated 81 milliGRAM(s) Oral daily  atorvastatin 40 milliGRAM(s) Oral at bedtime  Dakins Solution - 1/2 Strength 1 Application(s) Topical daily  darbepoetin Injectable Syringe 25 MICROGram(s) IV Push <User Schedule>  fluconAZOLE   Tablet 200 milliGRAM(s) Oral daily  glucagon  Injectable 1 milliGRAM(s) IntraMuscular once  heparin   Injectable 5000 Unit(s) SubCutaneous every 12 hours  influenza   Vaccine 0.5 milliLiter(s) IntraMuscular once  insulin glargine Injectable (LANTUS) 12 Unit(s) SubCutaneous at bedtime  insulin lispro (ADMELOG) corrective regimen sliding scale   SubCutaneous three times a day before meals  meropenem  IVPB 500 milliGRAM(s) IV Intermittent every 24 hours  metoprolol tartrate 50 milliGRAM(s) Oral two times a day  polyethylene glycol 3350 17 Gram(s) Oral daily  senna 1 Tablet(s) Oral two times a day  sevelamer carbonate 800 milliGRAM(s) Oral three times a day    MEDICATIONS  (PRN):  acetaminophen     Tablet .. 650 milliGRAM(s) Oral every 6 hours PRN Temp greater or equal to 38C (100.4F), Mild Pain (1 - 3)  aluminum hydroxide/magnesium hydroxide/simethicone Suspension 30 milliLiter(s) Oral every 4 hours PRN Dyspepsia  melatonin 3 milliGRAM(s) Oral at bedtime PRN Insomnia  ondansetron Injectable 4 milliGRAM(s) IV Push every 8 hours PRN Nausea and/or Vomiting      DVT PROPHYLAXIS: [] YES [] NO      LAB/STUDIES:                        8.4    10.43 )-----------( 324      ( 25 Oct 2022 07:24 )             25.2     10-    136  |  97<L>  |  33<H>  ----------------------------<  108<H>  4.5   |  28  |  4.3<HH>    Ca    8.2<L>      25 Oct 2022 07:24  Phos  3.7     10  Mg     2.0     10    TPro  6.6  /  Alb  2.8<L>  /  TBili  0.3  /  DBili  x   /  AST  12  /  ALT  6   /  AlkPhos  151<H>  10-25      LIVER FUNCTIONS - ( 25 Oct 2022 07:24 )  Alb: 2.8 g/dL / Pro: 6.6 g/dL / ALK PHOS: 151 U/L / ALT: 6 U/L / AST: 12 U/L / GGT: x             Urinalysis Basic - ( 23 Oct 2022 18:53 )    Color: Yellow / Appearance: Slightly Turbid / S.011 / pH: x  Gluc: x / Ketone: Negative  / Bili: Negative / Urobili: <2 mg/dL   Blood: x / Protein: 100 mg/dL / Nitrite: Negative   Leuk Esterase: Small / RBC: 6 /HPF / WBC 13 /HPF   Sq Epi: x / Non Sq Epi: 9 /HPF / Bacteria: Negative                    IMAGING:

## 2022-10-25 NOTE — PROGRESS NOTE ADULT - ASSESSMENT
61 y/o with a PMHx of DM, HTN, ESRD (on dialysis M/W/F, last session on 10/14), CAD s/p 1 stent on 2008 and quadruple CABG on 2012, PAD s/p bilateral angioplasty at [facility], was transferred from [facility] complaining of weakness and unhealed wound in the left foot. Patient was evaluated by vascular surgery and podiatry at [facility], who recommended debriding the L foot wound. Patient requested to be transferred to [facility] where his vascular surgeons are.    # HD in am   # on meropenem / dapto as per ID   # podiatry  and vascular notes appreciated / angio 10/20 - chronic occlusion of AT and PT and a patent bypass, no intervention performed s/p OR / podiatry notes appreciated   # BP if remains elevated add nifedipine xl 30   # on binders/PH noted on binders  #  iron stores noted / elevated ferritin  / cont RAMSEY/ keep Hb >7 / resistant   # will follow

## 2022-10-25 NOTE — PROGRESS NOTE ADULT - SUBJECTIVE AND OBJECTIVE BOX
SCHWABACHER, LAWRENCE  62y  [facility] F3-4B 014 B      Patient is a 62y old  Male who presents with a chief complaint of Sepsis (19 Oct 2022 14:02)      INTERVAL HPI/OVERNIGHT EVENTS:    Patient feels well but still experiencing dysuria. it seems he didn't get the fluconazole that was ordered  - urine cx will be resent as it's not getting processed.         FAMILY HISTORY:  Family history of heart disease (Father)    DM (diabetes mellitus) (Mother)    ESRD (end stage renal disease) on dialysis (Mother)      T(C): 35.8 (10-19-22 @ 12:58), Max: 36.3 (10-18-22 @ 21:14)  HR: 78 (10-19-22 @ 12:58) (69 - 79)  BP: 134/60 (10-19-22 @ 12:58) (134/60 - 167/86)  RR: 18 (10-19-22 @ 12:58) (18 - 18)  SpO2: 99% (10-19-22 @ 12:58) (99% - 99%)  Wt(kg): --Vital Signs Last 24 Hrs  T(C): 35.8 (19 Oct 2022 12:58), Max: 36.3 (18 Oct 2022 21:14)  T(F): 96.5 (19 Oct 2022 12:58), Max: 97.3 (18 Oct 2022 21:14)  HR: 78 (19 Oct 2022 12:58) (69 - 79)  BP: 134/60 (19 Oct 2022 12:58) (134/60 - 167/86)  BP(mean): --  RR: 18 (19 Oct 2022 12:58) (18 - 18)  SpO2: 99% (19 Oct 2022 12:58) (99% - 99%)    Parameters below as of 19 Oct 2022 12:58  Patient On (Oxygen Delivery Method): room air        PHYSICAL EXAM:  GENERAL: NAD, well-groomed, well-developed  PULM: Clear to auscultation bilaterally  CARDIAC: Regular rate and rhythm;  GI: Soft, Nontender, Nondistended; Bowel sounds present  EXTREMITIES:  left foot swollen and wrapped     Consultant(s) Notes Reviewed:  [x ] YES  [ ] NO  Care Discussed with Consultants/Other Providers [ x] YES  [ ] NO    LABS:                            7.4    11.48 )-----------( 389      ( 19 Oct 2022 09:20 )             22.7   10-19    133<L>  |  97<L>  |  53<H>  ----------------------------<  91  4.9   |  25  |  5.7<HH>    Ca    8.0<L>      19 Oct 2022 09:20  Phos  3.0     10-18  Mg     2.0     10-19    TPro  6.2  /  Alb  2.3<L>  /  TBili  0.2  /  DBili  x   /  AST  12  /  ALT  6   /  AlkPhos  143<H>  10-19            Culture - Abscess with Gram Stain (collected 16 Oct 2022 17:50)  Source: .Abscess left foot wound  Preliminary Report (18 Oct 2022 19:12):    Rare Morganella morganii    Rare Enterobacter cloacae complex    Rare Proteus vulgaris group    Rare Enterococcus faecium (vancomycin resistant)  Organism: Morganella morganii  Enterobacter cloacae complex  Proteus vulgaris group  Enterococcus faecium (vancomycin resistant) (18 Oct 2022 19:11)  Organism: Enterococcus faecium (vancomycin resistant) (18 Oct 2022 19:11)  Organism: Proteus vulgaris group (18 Oct 2022 19:05)  Organism: Enterobacter cloacae complex (18 Oct 2022 19:05)  Organism: Morganella morganii (18 Oct 2022 19:05)      acetaminophen     Tablet .. 650 milliGRAM(s) Oral every 6 hours PRN  aluminum hydroxide/magnesium hydroxide/simethicone Suspension 30 milliLiter(s) Oral every 4 hours PRN  aspirin enteric coated 81 milliGRAM(s) Oral daily  atorvastatin 40 milliGRAM(s) Oral at bedtime  darbepoetin Injectable Syringe 25 MICROGram(s) IV Push <User Schedule>  dextrose 10% Bolus 125 milliLiter(s) IV Bolus once  dextrose 5%. 1000 milliLiter(s) IV Continuous <Continuous>  dextrose 5%. 1000 milliLiter(s) IV Continuous <Continuous>  dextrose 5%. 1000 milliLiter(s) IV Continuous <Continuous>  dextrose 50% Injectable 25 Gram(s) IV Push once  dextrose 50% Injectable 12.5 Gram(s) IV Push once  dextrose 50% Injectable 25 Gram(s) IV Push once  dextrose Oral Gel 15 Gram(s) Oral once PRN  glucagon  Injectable 1 milliGRAM(s) IntraMuscular once  influenza   Vaccine 0.5 milliLiter(s) IntraMuscular once  insulin glargine Injectable (LANTUS) 15 Unit(s) SubCutaneous at bedtime  insulin lispro (ADMELOG) corrective regimen sliding scale   SubCutaneous three times a day before meals  melatonin 3 milliGRAM(s) Oral at bedtime PRN  meropenem  IVPB 500 milliGRAM(s) IV Intermittent every 24 hours  metoprolol tartrate 50 milliGRAM(s) Oral two times a day  ondansetron Injectable 4 milliGRAM(s) IV Push every 8 hours PRN  sevelamer carbonate 800 milliGRAM(s) Oral three times a day    61 y/o with a PMHx of DM, HTN, ESRD (on dialysis M/W/F, last session on 10/14), CAD s/p 1 stent on 2008 and quadruple CABG on 2012, PAD s/p bilateral angioplasty at [facility], was transferred from [facility] complaining of weakness and unhealed wound in the left foot.    1. 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 hx of PAD   - Cont meropenem 500mg IV daily   - IR for angiogram:  a) Left lower extremity angiogram demonstrating chronically occluded PT and AT with patent anterior tibial vein bypass reconstituting into the DP.   b) No microvascular outflow demonstrates within the calcaneous. No hemodynamically significant stenosis within the left SFA and popliteal artery. No intervention performed  - Vascular consult for repeat angiogram:  a) for another attempt left lower extremity angiogram with endovascular revascularization via tibial stick - scheduled Thursday 10/27 with Dr. Malik  - Blood cx:NTD   - MRI L foot done:  a) Osteomyelitis and erosion of the calcaneus with a vertical pathologic fracture extending to the subtalar joint. Gangrene of overlying subcutaneous tissues. 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  - ID consult appreciated :  a) Left Calcaneous OM s/p removal of FB 9/16  b)  Wound Cx 9/16 - Enterobacter cloacae. treated with two weeks post-HD vancomycin + cefepime  c)  wound CX 10/16 VRE Faecim, Enterobacter cloacae complex, Proteus vulgaris, Morganella morganii s/p debridement 10/21 Wound Cx Proteus mirabilis, VRE faecium, Pseudomonas putida and s/p debridement 10/24  d) ESBL E coli Bacteremia at [facility]   - Podiatry consult:  a) Plan for surgical debridement with wound VAC application early next week after vascular procedure      2. ESRD on HD  - HD as per nephrology     3. CAD s/p stent and CABG   - on statin   - Discuss with cardio. we're unable to hold aspirin. Cont aspirin   - holding plavix since 10/16  - continue BB  - Cardio consult appreciated. at moderate risk for surgery     4. DM II  - Lantus 12 units HS (was decrease due to morning hypoglycemia)     5.constipation resolved   - Cont miralax daily     6. Anemia of chronic disease due to ESKD s/p 1 PRBC preop   - transfuse 1 PRBC    7. Dysuria with pyuria  - U/a: pyuria   - Fluconazole 150 mg po*1 (Ordered 10/23/2022 but should be received on 10/25/2022)   - Urine cx:pending       Case Discussed with House Staff   30 minutes spent on total encounter; more than 50% of the visit was spent counseling and/or coordinating care by the attending physician.   Spectra [phone]     SCHWABACHER, LAWRENCE  62y  [facility] F3-4B 014 B      Patient is a 62y old  Male who presents with a chief complaint of Sepsis (19 Oct 2022 14:02)      INTERVAL HPI/OVERNIGHT EVENTS:    Patient feels well but still experiencing dysuria. it seems he didn't get the fluconazole that was ordered  - urine cx will be resent as it's not getting processed.         FAMILY HISTORY:  Family history of heart disease (Father)    DM (diabetes mellitus) (Mother)    ESRD (end stage renal disease) on dialysis (Mother)      T(C): 35.8 (10-19-22 @ 12:58), Max: 36.3 (10-18-22 @ 21:14)  HR: 78 (10-19-22 @ 12:58) (69 - 79)  BP: 134/60 (10-19-22 @ 12:58) (134/60 - 167/86)  RR: 18 (10-19-22 @ 12:58) (18 - 18)  SpO2: 99% (10-19-22 @ 12:58) (99% - 99%)  Wt(kg): --Vital Signs Last 24 Hrs  T(C): 35.8 (19 Oct 2022 12:58), Max: 36.3 (18 Oct 2022 21:14)  T(F): 96.5 (19 Oct 2022 12:58), Max: 97.3 (18 Oct 2022 21:14)  HR: 78 (19 Oct 2022 12:58) (69 - 79)  BP: 134/60 (19 Oct 2022 12:58) (134/60 - 167/86)  BP(mean): --  RR: 18 (19 Oct 2022 12:58) (18 - 18)  SpO2: 99% (19 Oct 2022 12:58) (99% - 99%)    Parameters below as of 19 Oct 2022 12:58  Patient On (Oxygen Delivery Method): room air        PHYSICAL EXAM:  GENERAL: NAD, well-groomed, well-developed  PULM: Clear to auscultation bilaterally  CARDIAC: Regular rate and rhythm;  GI: Soft, Nontender, Nondistended; Bowel sounds present  EXTREMITIES:  left foot swollen and wrapped     Consultant(s) Notes Reviewed:  [x ] YES  [ ] NO  Care Discussed with Consultants/Other Providers [ x] YES  [ ] NO    LABS:                            7.4    11.48 )-----------( 389      ( 19 Oct 2022 09:20 )             22.7   10-19    133<L>  |  97<L>  |  53<H>  ----------------------------<  91  4.9   |  25  |  5.7<HH>    Ca    8.0<L>      19 Oct 2022 09:20  Phos  3.0     10-18  Mg     2.0     10-19    TPro  6.2  /  Alb  2.3<L>  /  TBili  0.2  /  DBili  x   /  AST  12  /  ALT  6   /  AlkPhos  143<H>  10-19            Culture - Abscess with Gram Stain (collected 16 Oct 2022 17:50)  Source: .Abscess left foot wound  Preliminary Report (18 Oct 2022 19:12):    Rare Morganella morganii    Rare Enterobacter cloacae complex    Rare Proteus vulgaris group    Rare Enterococcus faecium (vancomycin resistant)  Organism: Morganella morganii  Enterobacter cloacae complex  Proteus vulgaris group  Enterococcus faecium (vancomycin resistant) (18 Oct 2022 19:11)  Organism: Enterococcus faecium (vancomycin resistant) (18 Oct 2022 19:11)  Organism: Proteus vulgaris group (18 Oct 2022 19:05)  Organism: Enterobacter cloacae complex (18 Oct 2022 19:05)  Organism: Morganella morganii (18 Oct 2022 19:05)      acetaminophen     Tablet .. 650 milliGRAM(s) Oral every 6 hours PRN  aluminum hydroxide/magnesium hydroxide/simethicone Suspension 30 milliLiter(s) Oral every 4 hours PRN  aspirin enteric coated 81 milliGRAM(s) Oral daily  atorvastatin 40 milliGRAM(s) Oral at bedtime  darbepoetin Injectable Syringe 25 MICROGram(s) IV Push <User Schedule>  dextrose 10% Bolus 125 milliLiter(s) IV Bolus once  dextrose 5%. 1000 milliLiter(s) IV Continuous <Continuous>  dextrose 5%. 1000 milliLiter(s) IV Continuous <Continuous>  dextrose 5%. 1000 milliLiter(s) IV Continuous <Continuous>  dextrose 50% Injectable 25 Gram(s) IV Push once  dextrose 50% Injectable 12.5 Gram(s) IV Push once  dextrose 50% Injectable 25 Gram(s) IV Push once  dextrose Oral Gel 15 Gram(s) Oral once PRN  glucagon  Injectable 1 milliGRAM(s) IntraMuscular once  influenza   Vaccine 0.5 milliLiter(s) IntraMuscular once  insulin glargine Injectable (LANTUS) 15 Unit(s) SubCutaneous at bedtime  insulin lispro (ADMELOG) corrective regimen sliding scale   SubCutaneous three times a day before meals  melatonin 3 milliGRAM(s) Oral at bedtime PRN  meropenem  IVPB 500 milliGRAM(s) IV Intermittent every 24 hours  metoprolol tartrate 50 milliGRAM(s) Oral two times a day  ondansetron Injectable 4 milliGRAM(s) IV Push every 8 hours PRN  sevelamer carbonate 800 milliGRAM(s) Oral three times a day    63 y/o with a PMHx of DM, HTN, ESRD (on dialysis M/W/F, last session on 10/14), CAD s/p 1 stent on 2008 and quadruple CABG on 2012, PAD s/p bilateral angioplasty at [facility], was transferred from [facility] complaining of weakness and unhealed wound in the left foot.    1. 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 hx of PAD   - Cont meropenem 500mg IV daily    -DAptomycin was added it by ID to cover VRE   - IR for angiogram:  a) Left lower extremity angiogram demonstrating chronically occluded PT and AT with patent anterior tibial vein bypass reconstituting into the DP.   b) No microvascular outflow demonstrates within the calcaneous. No hemodynamically significant stenosis within the left SFA and popliteal artery. No intervention performed  - Vascular consult for repeat angiogram:  a) for another attempt left lower extremity angiogram with endovascular revascularization via tibial stick - scheduled Thursday 10/27 with Dr. Malik  - Blood cx:NTD   - MRI L foot done:  a) Osteomyelitis and erosion of the calcaneus with a vertical pathologic fracture extending to the subtalar joint. Gangrene of overlying subcutaneous tissues. 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  - ID consult appreciated :  a) Left Calcaneous OM s/p removal of FB 9/16  b)  Wound Cx 9/16 - Enterobacter cloacae. treated with two weeks post-HD vancomycin + cefepime  c)  wound CX 10/16 VRE Faecim, Enterobacter cloacae complex, Proteus vulgaris, Morganella morganii s/p debridement 10/21 Wound Cx Proteus mirabilis, VRE faecium, Pseudomonas putida and s/p debridement 10/24  d) ESBL E coli Bacteremia at [facility]   - Podiatry consult:  a) Plan for surgical debridement with wound VAC application early next week after vascular procedure      2. ESRD on HD  - HD as per nephrology     3. CAD s/p stent and CABG   - on statin   - Discuss with cardio. we're unable to hold aspirin. Cont aspirin   - holding plavix since 10/16  - continue BB  - Cardio consult appreciated. at moderate risk for surgery     4. DM II  - Lantus 12 units HS (was decrease due to morning hypoglycemia)     5.constipation resolved   - Cont miralax daily     6. Anemia of chronic disease due to ESKD s/p 1 PRBC preop   - transfuse 1 PRBC    7. Dysuria with pyuria despite broad spectrum abs might be fungal infection   - U/a: pyuria   - Fluconazole 200 mg po followed by 100mg po daily  (Ordered 10/23/2022 but should be received on 10/25/2022)   - Urine cx:pending       Case Discussed with House Staff   30 minutes spent on total encounter; more than 50% of the visit was spent counseling and/or coordinating care by the attending physician.   Spectra [phone]

## 2022-10-25 NOTE — PROGRESS NOTE ADULT - ASSESSMENT
61 y/o with a PMHx of DM, HTN, ESRD (on dialysis M/W/F, last session on 10/14), CAD s/p 1 stent on 2008 and quadruple CABG on 2012, PAD s/p bilateral angioplasty at [facility], was transferred from [facility] complaining of weakness and unhealed wound in the left foot.    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 hx of PAD   - Cont meropenem 500mg IV daily   - IR for angiogram: a) Left lower extremity angiogram demonstrating chronically occluded PT and AT with patent anterior tibial vein bypass reconstituting into the DP. b) No microvascular outflow demonstrates within the calcaneous. No hemodynamically significant stenosis within the left SFA and popliteal artery. No intervention performed  - Blood cx 10/15:NTD   - Wound Cx 9/16 - Enterobacter cloacae  - treated with two weeks post-HD vancomycin + cefepime  - wound CX 10/16 VRE Faecim, Enterobacter cloacae complex, Proteus vulgaris, Morganella morganii   - s/p debridement 10/21 Wound Cx Proteus mirabilis, VRE faecium, Pseudomonas putida  - MRI L foot 10/17: a) Osteomyelitis and erosion of the calcaneus with a vertical pathologic fracture extending to the subtalar joint. Gangrene of overlying subcutaneous tissues. 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  - ID consult appreciated - will likely need at least 6 weeks from last debridement for calcaneal OM   - Podiatry consult:  - S/p Initial debridement 10/21  a) Local Wound Care; Wound Flushed w/ NS; Wound Packed w/ xeroform/ ABD x2 / DSD / Kerlix / Ace bandage  b) Weight Bearing Status; WBAT w/ Heel Touch w/ Surgical Shoe;   c) Continue w/ Local Wound Care; Q24 Dressing Changes;  d) Surgery scheduled for Monday 10/24/22 @ 11:00am excisional debridement of soft tissue and bone of left foot;   - Pt given 1 unit PRBC for hgb 7.1 10/24 pre-op  - Per ID: add daptomycin 12 mg/kg q 48 hours to cover VRE faecium    #Dysuria - r/o UTI  - Pt already receiving meropenem - should cover most UTI causing bacteria  - 10/24: Given 1x dose fluconazole in case of fungal origin   - UA WBC 13, nitrite negative, LEC small, bacteria negative   - F/u urine cx   - F/u Kidney & Bladder sonogram    # ESRD on HD  - HD as per nephrology   - C/w darbepoetin     # CAD s/p stent and CABG   - on statin   - Discuss with cardio. we're unable to hold aspirin. Cont aspirin   - holding plavix since 10/16  - continue BB  - Cardio consult appreciated. at moderate risk for surgery     # DM II  - hypoglycemic this am  - Lantus  15 units HS     # Constipation   - Cont miralax daily, senna    #Misc  - Diet: DASH  - GI: PPI  - DVT: Lovenox  - Dispo: Medicine 63 y/o with a PMHx of DM, HTN, ESRD (on dialysis M/W/F, last session on 10/14), CAD s/p 1 stent on 2008 and quadruple CABG on 2012, PAD s/p bilateral angioplasty at [facility], was transferred from [facility] complaining of weakness and unhealed wound in the left foot.    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 hx of PAD   - IR for angiogram 10/20/22: a) Left lower extremity angiogram demonstrating chronically occluded PT and AT with patent anterior tibial vein bypass reconstituting into the DP. b) No microvascular outflow demonstrates within the calcaneous. No hemodynamically significant stenosis within the left SFA and popliteal artery. No intervention performed  - Blood cx 10/15:NTD   - Wound Cx 9/16 - Enterobacter cloacae  - treated with two weeks post-HD vancomycin + cefepime  - wound CX 10/16 VRE Faecim, Enterobacter cloacae complex, Proteus vulgaris, Morganella morganii   - s/p debridement 10/21 Wound Cx Proteus mirabilis, VRE faecium, Pseudomonas putida  - MRI L foot 10/17: a) Osteomyelitis and erosion of the calcaneus with a vertical pathologic fracture extending to the subtalar joint. Gangrene of overlying subcutaneous tissues. 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  - ID consult appreciated - will likely need at least 6 weeks from last debridement for calcaneal OM  - Cont meropenem 500mg IV daily   - Per ID: add daptomycin 12 mg/kg q 48 hours to cover VRE faecium   - Podiatry consult:  a) Local Wound Care; Wound Flushed w/ NS; Wound Packed w/ xeroform/ ABD x2 / DSD / Kerlix / Ace bandage  b) Weight Bearing Status; WBAT w/ Heel Touch w/ Surgical Shoe;   c) Continue w/ Local Wound Care; Q24 Dressing Changes;  - S/p Initial debridement 10/21  - S/p 10/24/22 excisional debridement of soft tissue and bone of left foot; (pt given 1u pRBCs pre-op)  - Planned for another attempt left lower extremity angiogram with endovascular revascularization via tibial stick 2/ occluded segment found on previous angiogram (discussed with vascular team 10/25) - scheduled Thursday 10/27 with Dr. Malik  - PLEASE OBTAIN VEIN MAPPING AND CARDIOLOGY CLEARANCE FOR POSSIBLE BYPASS ON THURSDAY (Cardio on board)  - Plan for 3rd surgical debridement with wound VAC application after vascular procedure      #Dysuria - r/o UTI  - Pt already receiving meropenem - should cover most UTI causing bacteria  - 10/25: Given fluconazole with HD renal dosing in case of fungal origin   - UA 10/23 WBC 13, nitrite negative, LEC small, bacteria negative   - F/u urine cx   - Kidney & Bladder sonogram (-) for hydronephrosis     # ESRD on HD  - HD as per nephrology   - C/w darbepoetin     # CAD s/p stent and CABG   - on statin   - Discuss with cardio. we're unable to hold aspirin. Cont aspirin   - holding plavix since 10/16  - continue BB  - Cardio consult appreciated. at moderate risk for surgery     # DM II  - hypoglycemic this am  - Lantus  15 units HS     # Constipation   - Cont miralax daily, senna  - Last BM 10/23/22 - will consider adding milk of magnesium if continued     #Misc  - Diet: DASH  - GI: PPI  - DVT: Lovenox  - Dispo: Medicine

## 2022-10-26 LAB
ALBUMIN SERPL ELPH-MCNC: 2.5 G/DL — LOW (ref 3.5–5.2)
ALP SERPL-CCNC: 162 U/L — HIGH (ref 30–115)
ALT FLD-CCNC: <5 U/L — SIGNIFICANT CHANGE UP (ref 0–41)
ANION GAP SERPL CALC-SCNC: 11 MMOL/L — SIGNIFICANT CHANGE UP (ref 7–14)
APTT BLD: 34.3 SEC — SIGNIFICANT CHANGE UP (ref 27–39.2)
AST SERPL-CCNC: 11 U/L — SIGNIFICANT CHANGE UP (ref 0–41)
BILIRUB SERPL-MCNC: 0.3 MG/DL — SIGNIFICANT CHANGE UP (ref 0.2–1.2)
BLD GP AB SCN SERPL QL: SIGNIFICANT CHANGE UP
BUN SERPL-MCNC: 47 MG/DL — HIGH (ref 10–20)
CALCIUM SERPL-MCNC: 8.2 MG/DL — LOW (ref 8.4–10.5)
CHLORIDE SERPL-SCNC: 98 MMOL/L — SIGNIFICANT CHANGE UP (ref 98–110)
CK SERPL-CCNC: 15 U/L — SIGNIFICANT CHANGE UP (ref 0–225)
CO2 SERPL-SCNC: 28 MMOL/L — SIGNIFICANT CHANGE UP (ref 17–32)
CREAT SERPL-MCNC: 5.7 MG/DL — CRITICAL HIGH (ref 0.7–1.5)
CULTURE RESULTS: SIGNIFICANT CHANGE UP
CULTURE RESULTS: SIGNIFICANT CHANGE UP
EGFR: 10 ML/MIN/1.73M2 — LOW
GLUCOSE BLDC GLUCOMTR-MCNC: 120 MG/DL — HIGH (ref 70–99)
GLUCOSE BLDC GLUCOMTR-MCNC: 179 MG/DL — HIGH (ref 70–99)
GLUCOSE BLDC GLUCOMTR-MCNC: 212 MG/DL — HIGH (ref 70–99)
GLUCOSE SERPL-MCNC: 87 MG/DL — SIGNIFICANT CHANGE UP (ref 70–99)
HCT VFR BLD CALC: 23.6 % — LOW (ref 42–52)
HGB BLD-MCNC: 7.9 G/DL — LOW (ref 14–18)
INR BLD: 1.09 RATIO — SIGNIFICANT CHANGE UP (ref 0.65–1.3)
MAGNESIUM SERPL-MCNC: 2 MG/DL — SIGNIFICANT CHANGE UP (ref 1.8–2.4)
MCHC RBC-ENTMCNC: 31.9 PG — HIGH (ref 27–31)
MCHC RBC-ENTMCNC: 33.5 G/DL — SIGNIFICANT CHANGE UP (ref 32–37)
MCV RBC AUTO: 95.2 FL — HIGH (ref 80–94)
NRBC # BLD: 0 /100 WBCS — SIGNIFICANT CHANGE UP (ref 0–0)
ORGANISM # SPEC MICROSCOPIC CNT: SIGNIFICANT CHANGE UP
PLATELET # BLD AUTO: 325 K/UL — SIGNIFICANT CHANGE UP (ref 130–400)
POTASSIUM SERPL-MCNC: 4.9 MMOL/L — SIGNIFICANT CHANGE UP (ref 3.5–5)
POTASSIUM SERPL-SCNC: 4.9 MMOL/L — SIGNIFICANT CHANGE UP (ref 3.5–5)
PROT SERPL-MCNC: 6.5 G/DL — SIGNIFICANT CHANGE UP (ref 6–8)
PROTHROM AB SERPL-ACNC: 12.5 SEC — SIGNIFICANT CHANGE UP (ref 9.95–12.87)
RBC # BLD: 2.48 M/UL — LOW (ref 4.7–6.1)
RBC # FLD: 14.9 % — HIGH (ref 11.5–14.5)
SARS-COV-2 RNA SPEC QL NAA+PROBE: SIGNIFICANT CHANGE UP
SODIUM SERPL-SCNC: 137 MMOL/L — SIGNIFICANT CHANGE UP (ref 135–146)
SPECIMEN SOURCE: SIGNIFICANT CHANGE UP
SPECIMEN SOURCE: SIGNIFICANT CHANGE UP
WBC # BLD: 10.23 K/UL — SIGNIFICANT CHANGE UP (ref 4.8–10.8)
WBC # FLD AUTO: 10.23 K/UL — SIGNIFICANT CHANGE UP (ref 4.8–10.8)

## 2022-10-26 PROCEDURE — 99233 SBSQ HOSP IP/OBS HIGH 50: CPT

## 2022-10-26 PROCEDURE — 93970 EXTREMITY STUDY: CPT | Mod: 26

## 2022-10-26 RX ORDER — DARBEPOETIN ALFA IN POLYSORBAT 200MCG/0.4
40 PEN INJECTOR (ML) SUBCUTANEOUS
Refills: 0 | Status: DISCONTINUED | OUTPATIENT
Start: 2022-10-26 | End: 2022-10-27

## 2022-10-26 RX ORDER — PHENAZOPYRIDINE HCL 100 MG
100 TABLET ORAL THREE TIMES A DAY
Refills: 0 | Status: DISCONTINUED | OUTPATIENT
Start: 2022-10-26 | End: 2022-10-27

## 2022-10-26 RX ORDER — NIFEDIPINE 30 MG
30 TABLET, EXTENDED RELEASE 24 HR ORAL DAILY
Refills: 0 | Status: DISCONTINUED | OUTPATIENT
Start: 2022-10-26 | End: 2022-10-27

## 2022-10-26 RX ADMIN — Medication 50 MILLIGRAM(S): at 17:24

## 2022-10-26 RX ADMIN — SENNA PLUS 1 TABLET(S): 8.6 TABLET ORAL at 17:26

## 2022-10-26 RX ADMIN — Medication 81 MILLIGRAM(S): at 11:25

## 2022-10-26 RX ADMIN — Medication 100 MILLIGRAM(S): at 22:02

## 2022-10-26 RX ADMIN — SEVELAMER CARBONATE 800 MILLIGRAM(S): 2400 POWDER, FOR SUSPENSION ORAL at 14:57

## 2022-10-26 RX ADMIN — INSULIN GLARGINE 12 UNIT(S): 100 INJECTION, SOLUTION SUBCUTANEOUS at 22:05

## 2022-10-26 RX ADMIN — FLUCONAZOLE 100 MILLIGRAM(S): 150 TABLET ORAL at 17:24

## 2022-10-26 RX ADMIN — Medication 50 MILLIGRAM(S): at 06:07

## 2022-10-26 RX ADMIN — HEPARIN SODIUM 5000 UNIT(S): 5000 INJECTION INTRAVENOUS; SUBCUTANEOUS at 06:08

## 2022-10-26 RX ADMIN — ATORVASTATIN CALCIUM 40 MILLIGRAM(S): 80 TABLET, FILM COATED ORAL at 22:06

## 2022-10-26 RX ADMIN — Medication 4: at 17:25

## 2022-10-26 RX ADMIN — HEPARIN SODIUM 5000 UNIT(S): 5000 INJECTION INTRAVENOUS; SUBCUTANEOUS at 17:24

## 2022-10-26 RX ADMIN — SEVELAMER CARBONATE 800 MILLIGRAM(S): 2400 POWDER, FOR SUSPENSION ORAL at 22:02

## 2022-10-26 RX ADMIN — Medication 1 APPLICATION(S): at 14:57

## 2022-10-26 RX ADMIN — MEROPENEM 100 MILLIGRAM(S): 1 INJECTION INTRAVENOUS at 17:24

## 2022-10-26 RX ADMIN — Medication 100 MILLIGRAM(S): at 14:57

## 2022-10-26 RX ADMIN — Medication 30 MILLIGRAM(S): at 15:50

## 2022-10-26 RX ADMIN — SEVELAMER CARBONATE 800 MILLIGRAM(S): 2400 POWDER, FOR SUSPENSION ORAL at 06:08

## 2022-10-26 RX ADMIN — Medication 25 MICROGRAM(S): at 10:43

## 2022-10-26 RX ADMIN — SENNA PLUS 1 TABLET(S): 8.6 TABLET ORAL at 06:08

## 2022-10-26 NOTE — PROGRESS NOTE ADULT - ASSESSMENT
61 y/o with a PMHx of DM, HTN, ESRD (on dialysis M/W/F, last session on 10/14), CAD s/p 1 stent on 2008 and quadruple CABG on 2012, PAD s/p bilateral angioplasty at [facility], was transferred from [facility] complaining of weakness and unhealed wound in the left foot. Patient was evaluated by vascular surgery and podiatry at [facility], who recommended debriding the L foot wound. Patient requested to be transferred to [facility] where his vascular surgeons are.    # HD today, 3hrs, 2K, 2-2.5L UF as tolerated  # on meropenem / dapto as per ID   # podiatry  and vascular notes appreciated / angio 10/20 - chronic occlusion of AT and PT and a patent bypass, no intervention performed s/p OR   # BP remains elevated- add nifedipine xl 30   # on binders/PH noted   # iron stores noted / elevated ferritin  / increase RAMSEY / maintain keep Hb >7    # will follow   Patient is a 25 y.o female with no known PMHx who presents to ED c/o chills, subjective fevers, Lt side flank pain and dysuria. Pt also mentioned CP to ED, pt had recent J&J vaccine 3 weeks ago. Pt arrived tachycardic 110 with rectal temp of 101F. Pt seen and worked up in ED; no leukocytosis. UA+for UTI. CTA chest negative for PE, CT a/p + for Lt side pyelonephritis and ureteritis. Pt given rocephin and IVF in ED. Transferred to CDU for; IVF, IV abx, AM labs, Antipyretic and analgesic prn, vitals q4 and frequent re-evals.

## 2022-10-26 NOTE — PROGRESS NOTE ADULT - SUBJECTIVE AND OBJECTIVE BOX
SCHWABACHER, LAWRENCE  62y, Male  Allergy: No Known Allergies      LOS  12d    CHIEF COMPLAINT: Sepsis (26 Oct 2022 15:34)      INTERVAL EVENTS/HPI  - No acute events overnight  - T(F): , Max: 97.5 (10-26-22 @ 05:10)  - Denies any worsening symptoms  - Tolerating medication  - WBC Count: 10.23 (10-26-22 @ 06:53)  WBC Count: 10.43 (10-25-22 @ 07:24)     - Creatinine, Serum: 5.7 (10-26-22 @ 06:53)  Creatinine, Serum: 4.3 (10-25-22 @ 07:24)       ROS  General: Denies rigors, nightsweats  HEENT: Denies headache, rhinorrhea, sore throat, eye pain  CV: Denies CP, palpitations  PULM: Denies wheezing, hemoptysis  GI: Denies hematemesis, hematochezia, melena  : Denies discharge, hematuria  MSK: Denies arthralgias, myalgias  SKIN: Denies rash, lesions  NEURO: Denies paresthesias, weakness  PSYCH: Denies depression, anxiety    VITALS:  T(F): 96.5, Max: 97.5 (10-26-22 @ 05:10)  HR: 77  BP: 164/74  RR: 18Vital Signs Last 24 Hrs  T(C): 35.8 (26 Oct 2022 13:38), Max: 36.4 (26 Oct 2022 05:10)  T(F): 96.5 (26 Oct 2022 13:38), Max: 97.5 (26 Oct 2022 05:10)  HR: 77 (26 Oct 2022 13:38) (58 - 88)  BP: 164/74 (26 Oct 2022 13:38) (154/72 - 172/69)  BP(mean): --  RR: 18 (26 Oct 2022 13:38) (18 - 19)  SpO2: --    Parameters below as of 26 Oct 2022 10:50  Patient On (Oxygen Delivery Method): room air        PHYSICAL EXAM:  Gen: NAD, resting in bed  HEENT: Normocephalic, atraumatic  Neck: supple, no lymphadenopathy  CV: Regular rate & regular rhythm  Lungs: decreased BS at bases, no fremitus  Abdomen: Soft, BS present  Ext: Warm, well perfused  Neuro: non focal, awake  Skin: no rash, no erythema  Lines: no phlebitis    FH: Non-contributory  Social Hx: Non-contributory    TESTS & MEASUREMENTS:                        7.9    10.23 )-----------( 325      ( 26 Oct 2022 06:53 )             23.6     10-26    137  |  98  |  47<H>  ----------------------------<  87  4.9   |  28  |  5.7<HH>    Ca    8.2<L>      26 Oct 2022 06:53  Phos  3.7     10-25  Mg     2.0     10-26    TPro  6.5  /  Alb  2.5<L>  /  TBili  0.3  /  DBili  x   /  AST  11  /  ALT  <5  /  AlkPhos  162<H>  10-26      LIVER FUNCTIONS - ( 26 Oct 2022 06:53 )  Alb: 2.5 g/dL / Pro: 6.5 g/dL / ALK PHOS: 162 U/L / ALT: <5 U/L / AST: 11 U/L / GGT: x               Culture - Tissue with Gram Stain (collected 10-21-22 @ 14:05)  Source: .Tissue None  Gram Stain (10-22-22 @ 01:55):    No polymorphonuclear leukocytes seen per low power field    No organisms seen per oil power field  Final Report (10-26-22 @ 15:47):    Moderate Proteus mirabilis    Rare Pseudomonas putida group    Rare Enterococcus faecium (vancomycin resistant)    Rare Staphylococcus haemolyticus  Organism: Proteus mirabilis  Pseudomonas putida group  Enterococcus faecium (vancomycin resistant)  Staphylococcus haemolyticus (10-26-22 @ 15:47)  Organism: Staphylococcus haemolyticus (10-26-22 @ 15:47)      -  Ampicillin/Sulbactam: R 16/8      -  Cefazolin: R >16      -  Clindamycin: R >4      -  Erythromycin: R >4      -  Gentamicin: R >8 Should not be used as monotherapy      -  Oxacillin: R >2      -  Penicillin: R >8      -  Rifampin: R >2 Should not be used as monotherapy      -  Tetra/Doxy: S <=1      -  Trimethoprim/Sulfamethoxazole: R >2/38      -  Vancomycin: S 4      Method Type: CONNER  Organism: Enterococcus faecium (vancomycin resistant) (10-26-22 @ 15:47)      -  Ampicillin: R >8 Predicts results to ampicillin/sulbactam, amoxacillin-clavulanate and  piperacillin-tazobactam.      -  Daptomycin: SDD 4 The breakpoint for SDD (sensitive dose dependent)is based on a dosage regimen of 8-12 mg/kg administered every 24 h in adults and is intended for serious infections due to E. faecium. Consultation with an infectious diseases specialist is recommended.      -  Levofloxacin: R >4      -  Linezolid: S 2      -  Tetra/Doxy: R >8      -  Vancomycin: R >16      Method Type: CONNER  Organism: Pseudomonas putida group (10-26-22 @ 15:47)      -  Amikacin: S <=16      -  Aztreonam: S <=4      -  Cefepime: S <=2      -  Ceftriaxone: S <=1      -  Ciprofloxacin: S <=0.25      -  Gentamicin: S <=2      -  Levofloxacin: S <=0.5      -  Meropenem: S <=1      -  Piperacillin/Tazobactam: S <=8      -  Tobramycin: S <=2      -  Trimethoprim/Sulfamethoxazole: S <=0.5/9.5      Method Type: CONNER  Organism: Proteus mirabilis (10-26-22 @ 15:47)      -  Amikacin: S <=16      -  Amoxicillin/Clavulanic Acid: S <=8/4      -  Ampicillin: S <=8 These ampicillin results predict results for amoxicillin      -  Ampicillin/Sulbactam: S <=4/2 Enterobacter, Klebsiella aerogenes, Citrobacter, and Serratia may develop resistance during prolonged therapy (3-4 days)      -  Aztreonam: S <=4      -  Cefazolin: S <=2 Enterobacter, Klebsiella aerogenes, Citrobacter, and Serratia may develop resistance during prolonged therapy (3-4 days)      -  Cefepime: S <=2      -  Cefoxitin: S <=8      -  Ceftriaxone: S <=1 Enterobacter, Klebsiella aerogenes, Citrobacter, and Serratia may develop resistance during prolonged therapy      -  Ciprofloxacin: S <=0.25      -  Ertapenem: S <=0.5      -  Gentamicin: S <=2      -  Levofloxacin: S <=0.5      -  Meropenem: S <=1      -  Piperacillin/Tazobactam: S <=8      -  Tobramycin: S <=2      -  Trimethoprim/Sulfamethoxazole: S <=0.5/9.5      Method Type: CONNER    Culture - Surgical Swab (collected 10-21-22 @ 14:04)  Source: .Surgical Swab None  Final Report (10-26-22 @ 16:01):    Rare Proteus vulgaris group    Few Enterococcus faecium (vancomycin resistant)    Rare Pseudomonas putida group    Rare Coag Negative Staphylococcus "Susceptibilities not performed"  Organism: Proteus vulgaris group  Enterococcus faecium (vancomycin resistant)  Pseudomonas putida group (10-26-22 @ 16:01)  Organism: Pseudomonas putida group (10-26-22 @ 16:01)      -  Amikacin: S <=16      -  Aztreonam: S 8      -  Cefepime: S <=2      -  Ceftriaxone: S 8      -  Ciprofloxacin: S <=0.25      -  Gentamicin: S <=2      -  Levofloxacin: S <=0.5      -  Meropenem: S <=1      -  Piperacillin/Tazobactam: S <=8      -  Tobramycin: S <=2      -  Trimethoprim/Sulfamethoxazole: R >2/38      Method Type: CONNER  Organism: Enterococcus faecium (vancomycin resistant) (10-26-22 @ 16:01)      -  Ampicillin: R >8 Predicts results to ampicillin/sulbactam, amoxacillin-clavulanate and  piperacillin-tazobactam.      -  Daptomycin: SDD 4 The breakpoint for SDD (sensitive dose dependent)is based on a dosage regimen of 8-12 mg/kg administered every 24 h in adults and is intended for serious infections due to E. faecium. Consultation with an infectious diseases specialist is recommended.      -  Levofloxacin: R >4      -  Linezolid: S 2      -  Tetra/Doxy: R >8      -  Vancomycin: R >16      Method Type: CONNER  Organism: Proteus vulgaris group (10-26-22 @ 16:01)      -  Amikacin: S <=16      -  Amoxicillin/Clavulanic Acid: S <=8/4      -  Ampicillin: R <=8 These ampicillin results predict results for amoxicillin      -  Ampicillin/Sulbactam: S <=4/2 Enterobacter, Klebsiella aerogenes, Citrobacter, and Serratia may develop resistance during prolonged therapy (3-4 days)      -  Aztreonam: S <=4      -  Cefazolin: R <=2 Enterobacter, Klebsiella aerogenes, Citrobacter, and Serratia may develop resistance during prolonged therapy (3-4 days)      -  Cefepime: S <=2      -  Cefoxitin: S <=8      -  Ceftriaxone: S <=1 Enterobacter, Klebsiella aerogenes, Citrobacter, and Serratia may develop resistance during prolonged therapy      -  Ciprofloxacin: S <=0.25      -  Ertapenem: S <=0.5      -  Gentamicin: S <=2      -  Levofloxacin: S <=0.5      -  Meropenem: S <=1      -  Piperacillin/Tazobactam: S <=8      -  Tobramycin: S <=2      -  Trimethoprim/Sulfamethoxazole: S <=0.5/9.5      Method Type: CONNER    Culture - Abscess with Gram Stain (collected 10-16-22 @ 17:50)  Source: .Abscess left foot wound  Final Report (10-21-22 @ 18:00):    Rare Morganella morganii    Rare Enterobacter cloacae complex    Rare Proteus vulgaris group    Rare Enterococcus faecium (vancomycin resistant)  Organism: Morganella morganii  Enterobacter cloacae complex  Proteus vulgaris group  Enterococcus faecium (vancomycin resistant) (10-21-22 @ 18:00)  Organism: Enterococcus faecium (vancomycin resistant) (10-21-22 @ 18:00)      -  Ampicillin: R >8 Predicts results to ampicillin/sulbactam, amoxacillin-clavulanate and  piperacillin-tazobactam.      -  Daptomycin: SDD 4 The breakpoint for SDD (sensitive dose dependent)is based on a dosage regimen of 8-12 mg/kg administered every 24 h in adults and is intended for serious infections due to E. faecium. Consultation with an infectious diseases specialist is recommended.      -  Levofloxacin: R >4      -  Linezolid: S 1      -  Tetra/Doxy: R >8      -  Vancomycin: R >16      Method Type: CONNER  Organism: Proteus vulgaris group (10-21-22 @ 18:00)      -  Amikacin: S <=16      -  Amoxicillin/Clavulanic Acid: S <=8/4      -  Ampicillin: R >16 These ampicillin results predict results for amoxicillin      -  Ampicillin/Sulbactam: S <=4/2 Enterobacter, Klebsiella aerogenes, Citrobacter, and Serratia may develop resistance during prolonged therapy (3-4 days)      -  Aztreonam: S <=4      -  Cefazolin: R >16 Enterobacter, Klebsiella aerogenes, Citrobacter, and Serratia may develop resistance during prolonged therapy (3-4 days)      -  Cefepime: S <=2      -  Cefoxitin: S <=8      -  Ceftriaxone: S <=1 Enterobacter, Klebsiella aerogenes, Citrobacter, and Serratia may develop resistance during prolonged therapy      -  Ciprofloxacin: S <=0.25      -  Ertapenem: S <=0.5      -  Gentamicin: S <=2      -  Levofloxacin: S <=0.5      -  Meropenem: S <=1      -  Piperacillin/Tazobactam: S <=8      -  Tobramycin: S <=2      -  Trimethoprim/Sulfamethoxazole: S <=0.5/9.5      Method Type: CONNER  Organism: Enterobacter cloacae complex (10-21-22 @ 18:00)      -  Amikacin: S <=16      -  Amoxicillin/Clavulanic Acid: R 16/8      -  Ampicillin: R >16 These ampicillin results predict results for amoxicillin      -  Ampicillin/Sulbactam: R <=4/2 Enterobacter, Klebsiella aerogenes, Citrobacter, and Serratia may develop resistance during prolonged therapy (3-4 days)      -  Aztreonam: S 8      -  Cefazolin: R >16 Enterobacter, Klebsiella aerogenes, Citrobacter, and Serratia may develop resistance during prolonged therapy (3-4 days)      -  Cefepime: S <=2      -  Cefoxitin: R 16      -  Ceftriaxone: R >32 Enterobacter, Klebsiella aerogenes, Citrobacter, and Serratia may develop resistance during prolonged therapy      -  Ciprofloxacin: S <=0.25      -  Ertapenem: S <=0.5      -  Gentamicin: S <=2      -  Imipenem: S <=1      -  Levofloxacin: S <=0.5      -  Meropenem: S <=1      -  Piperacillin/Tazobactam: S <=8      -  Tobramycin: S <=2      -  Trimethoprim/Sulfamethoxazole: S 1/19      Method Type: CONNER  Organism: Morganella morganii (10-21-22 @ 18:00)      -  Amikacin: S <=16      -  Amoxicillin/Clavulanic Acid: R >16/8      -  Ampicillin: R >16 These ampicillin results predict results for amoxicillin      -  Ampicillin/Sulbactam: I 16/8 Enterobacter, Klebsiella aerogenes, Citrobacter, and Serratia may develop resistance during prolonged therapy (3-4 days)      -  Aztreonam: S <=4      -  Cefazolin: R >16 Enterobacter, Klebsiella aerogenes, Citrobacter, and Serratia may develop resistance during prolonged therapy (3-4 days)      -  Cefepime: S <=2      -  Cefoxitin: S <=8      -  Ceftriaxone: S <=1 Enterobacter, Klebsiella aerogenes, Citrobacter, and Serratia may develop resistance during prolonged therapy      -  Ciprofloxacin: S <=0.25      -  Ertapenem: S <=0.5      -  Gentamicin: S <=2      -  Imipenem: S <=1      -  Levofloxacin: S <=0.5      -  Meropenem: S <=1      -  Piperacillin/Tazobactam: S <=8      -  Tobramycin: S <=2      -  Trimethoprim/Sulfamethoxazole: S <=0.5/9.5      Method Type: CONNER    Culture - Blood (collected 10-15-22 @ 12:07)  Source: .Blood None  Final Report (10-20-22 @ 21:00):    No Growth Final            INFECTIOUS DISEASES TESTING  COVID-19 PCR: NotDetec (10-17-22 @ 20:19)  COVID-19 PCR: Detected (09-20-22 @ 12:50)  COVID-19 PCR: NotDetec (09-14-22 @ 13:45)  COVID-19 PCR: NotDetec (09-06-22 @ 01:35)  COVID-19 PCR: NotDetec (03-02-22 @ 14:15)  COVID-19 PCR: NotDetec (02-27-22 @ 15:30)  COVID-19 PCR: NotDetec (02-23-22 @ 11:50)  COVID-19 PCR: NotDetec (02-21-22 @ 10:25)      INFLAMMATORY MARKERS  C-Reactive Protein, Serum: 114.2 mg/L (10-16-22 @ 12:26)  Sedimentation Rate, Erythrocyte: 140 mm/Hr (10-16-22 @ 12:26)  Sedimentation Rate, Erythrocyte: 142 mm/Hr (10-15-22 @ 09:53)  C-Reactive Protein, Serum: 141.8 mg/L (10-15-22 @ 09:53)      RADIOLOGY & ADDITIONAL TESTS:  I have personally reviewed the last available Chest xray  CXR      CT      CARDIOLOGY TESTING  12 Lead ECG:   Ventricular Rate 89 BPM    Atrial Rate 89 BPM    P-R Interval 194 ms    QRS Duration 96 ms    Q-T Interval 416 ms    QTC Calculation(Bazett) 506 ms    P Axis 37 degrees    R Axis 120 degrees    T Axis 62 degrees    Diagnosis Line Normal sinus rhythm  Right axis deviation  Possible Anterior infarct , age undetermined  Abnormal ECG    Confirmed by NEAL BENAVIDEZ MD ([id number]) on 10/26/2022 12:58:21 PM (10-25-22 @ 22:25)  12 Lead ECG:   Ventricular Rate 86 BPM    Atrial Rate 86 BPM    P-R Interval 170 ms    QRS Duration 98 ms    Q-T Interval 412 ms    QTC Calculation(Bazett) 493 ms    P Axis 19 degrees    R Axis -19 degrees    T Axis 88 degrees    Diagnosis Line Normal sinus rhythm  Possible Anterior infarct , age undetermined  Abnormal ECG    Confirmed by Yusef Isaacs ([id number]) on 10/17/2022 9:10:37 AM (10-15-22 @ 09:56)      MEDICATIONS  aspirin enteric coated 81 Oral daily  atorvastatin 40 Oral at bedtime  Dakins Solution - 1/2 Strength 1 Topical daily  DAPTOmycin IVPB 1000 IV Intermittent every 48 hours  darbepoetin Injectable Syringe 40 IV Push <User Schedule>  fluconAZOLE   Tablet 100 Oral every 24 hours  glucagon  Injectable 1 IntraMuscular once  heparin   Injectable 5000 SubCutaneous every 12 hours  influenza   Vaccine 0.5 IntraMuscular once  insulin glargine Injectable (LANTUS) 12 SubCutaneous at bedtime  insulin lispro (ADMELOG) corrective regimen sliding scale  SubCutaneous three times a day before meals  meropenem  IVPB 500 IV Intermittent every 24 hours  metoprolol tartrate 50 Oral two times a day  NIFEdipine XL 30 Oral daily  phenazopyridine 100 Oral three times a day  polyethylene glycol 3350 17 Oral daily  senna 1 Oral two times a day  sevelamer carbonate 800 Oral three times a day      WEIGHT  Weight (kg): 90.1 (10-24-22 @ 13:35)  Creatinine, Serum: 5.7 mg/dL (10-26-22 @ 06:53)      ANTIBIOTICS:  DAPTOmycin IVPB 1000 milliGRAM(s) IV Intermittent every 48 hours  fluconAZOLE   Tablet 100 milliGRAM(s) Oral every 24 hours  meropenem  IVPB 500 milliGRAM(s) IV Intermittent every 24 hours      All available historical records have been reviewed

## 2022-10-26 NOTE — PROGRESS NOTE ADULT - ASSESSMENT
61 y/o with a PMHx of DM, HTN, ESRD (on dialysis M/W/F, last session on 10/14), CAD s/p 1 stent on 2008 and quadruple CABG on 2012, PAD s/p bilateral angioplasty at [facility], was transferred from [facility] complaining of weakness and unhealed wound in the left foot.    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   b) No microvascular outflow demonstrates within the calcaneous. No hemodynamically significant stenosis within the left SFA and popliteal artery. No intervention performed  - Vascular planning on repeat angio with endovascular revascularization via tibial stick - scheduled Thursday 10/27 with Dr. Malik  -Left Calcaneous OM s/p removal of FB 9/16  -Wound Cx 9/16 - Enterobacter cloacae. treated with two weeks post-HD vancomycin + cefepime  -wound CX 10/16 VRE Faecim, Enterobacter cloacae complex, Proteus vulgaris, Morganella morganii s/p debridement 10/21 Wound Cx Proteus mirabilis, VRE faecium, Pseudomonas putida and s/p debridement 10/24  -Podiatry planning on surgical debridement with wound VAC application early next week after vascular procedure  -NPO after midnight  -covid today  -pre-op labs    #ESRD on HD  - HD as per nephrology     #CAD s/p stent and CABG   - on asa and statin   - holding plavix since 10/16  - continue BB  - Cardio consult requested by vascular - cardio aware     #DM II  -fs well controlled on current regimen     #Anemia of chronic disease due to ESKD s/p 1 PRBC preop   - transfused 1 PRBC    #Dysuria with pyuria  - Fluconazole  - follow up urine culture    Progress Note Handoff  Pending Consults: cardio  Pending Tests: labs  Pending Results: labs  Family Discussion: discussed labs, meds, vascular procedure, further podiatry debridements and overall plan of care with pt and medical staff.   Disposition: Home_____/SNF______/Other_____/Unknown at this time___x__  Spent over 45 min reviewing chart and on coordinating patient care during interdisciplinary rounds

## 2022-10-26 NOTE — PROGRESS NOTE ADULT - ASSESSMENT
63 y/o with a PMHx of DM, HTN, ESRD (on dialysis M/W/F, last session on 10/14), CAD s/p 1 stent on 2008 and quadruple CABG on 2012, PAD s/p bilateral angioplasty at [facility], was transferred from [facility] complaining of weakness and unhealed wound in the left foot.    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 hx of PAD   - IR for angiogram 10/20/22: a) Left lower extremity angiogram demonstrating chronically occluded PT and AT with patent anterior tibial vein bypass reconstituting into the DP. b) No microvascular outflow demonstrates within the calcaneous. No hemodynamically significant stenosis within the left SFA and popliteal artery. No intervention performed  - Blood cx 10/15:NTD   - Wound Cx 9/16 - Enterobacter cloacae  - treated with two weeks post-HD vancomycin + cefepime  - wound CX 10/16 VRE Faecim, Enterobacter cloacae complex, Proteus vulgaris, Morganella morganii   - s/p debridement 10/21 Wound Cx Proteus mirabilis, VRE faecium, Pseudomonas putida  - MRI L foot 10/17: a) Osteomyelitis and erosion of the calcaneus with a vertical pathologic fracture extending to the subtalar joint. Gangrene of overlying subcutaneous tissues. 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  - ID consult appreciated - will likely need at least 6 weeks from last debridement for calcaneal OM  - Cont meropenem 500mg IV daily   - Per ID: add daptomycin 12 mg/kg q 48 hours to cover VRE faecium   - Podiatry consult:  a) Local Wound Care; Wound Flushed w/ NS; Wound Packed w/ xeroform/ ABD x2 / DSD / Kerlix / Ace bandage  b) Weight Bearing Status; WBAT w/ Heel Touch w/ Surgical Shoe;   c) Continue w/ Local Wound Care; Q24 Dressing Changes;  - S/p Initial debridement 10/21  - S/p 10/24/22 excisional debridement of soft tissue and bone of left foot; (pt given 1u pRBCs pre-op)  - Planned for another attempt left lower extremity angiogram with endovascular revascularization via tibial stick 2/ occluded segment found on previous angiogram (discussed with vascular team 10/25) - scheduled Thursday 10/27 with Dr. Malik  - PLEASE OBTAIN VEIN MAPPING AND CARDIOLOGY CLEARANCE FOR POSSIBLE BYPASS ON THURSDAY (Cardio on board)  - Plan for 3rd surgical debridement with wound VAC application after vascular procedure      #Dysuria - r/o UTI  - Pt already receiving meropenem - should cover most UTI causing bacteria  - 10/25: Given fluconazole with HD renal dosing in case of fungal origin   - UA 10/23 WBC 13, nitrite negative, LEC small, bacteria negative   - F/u urine cx   - Kidney & Bladder sonogram (-) for hydronephrosis     # ESRD on HD  - HD as per nephrology   - C/w darbepoetin     # CAD s/p stent and CABG   - on statin   - Discuss with cardio. we're unable to hold aspirin. Cont aspirin   - holding plavix since 10/16  - continue BB  - Cardio consult appreciated. at moderate risk for surgery     # DM II  - hypoglycemic this am  - Lantus  15 units HS     # Constipation   - Cont miralax daily, senna  - Last BM 10/23/22 - will consider adding milk of magnesium if continued     #Misc  - Diet: DASH  - GI: PPI  - DVT: Lovenox  - Dispo: Medicine   61 y/o with a PMHx of DM, HTN, ESRD (on dialysis M/W/F, last session on 10/14), CAD s/p 1 stent on 2008 and quadruple CABG on 2012, PAD s/p bilateral angioplasty at [facility], was transferred from [facility] complaining of weakness and unhealed wound in the left foot.    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 hx of PAD   - IR for angiogram 10/20/22: a) Left lower extremity angiogram demonstrating chronically occluded PT and AT with patent anterior tibial vein bypass reconstituting into the DP. b) No microvascular outflow demonstrates within the calcaneous. No hemodynamically significant stenosis within the left SFA and popliteal artery. No intervention performed  - Blood cx 10/15:NTD   - Wound Cx 9/16 - Enterobacter cloacae  - treated with two weeks post-HD vancomycin + cefepime  - wound CX 10/16 VRE Faecim, Enterobacter cloacae complex, Proteus vulgaris, Morganella morganii   - s/p debridement 10/21 Wound Cx Proteus mirabilis, VRE faecium, Pseudomonas putida  - MRI L foot 10/17: a) Osteomyelitis and erosion of the calcaneus with a vertical pathologic fracture extending to the subtalar joint. Gangrene of overlying subcutaneous tissues. 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  - ID consult appreciated - will likely need at least 6 weeks from last debridement for calcaneal OM  - Cont meropenem 500mg IV daily   - Per ID: add daptomycin 12 mg/kg q 48 hours to cover VRE faecium   - Podiatry consult:  a) Local Wound Care; Wound Flushed w/ NS; Wound Packed w/ xeroform/ ABD x2 / DSD / Kerlix / Ace bandage  b) Weight Bearing Status; WBAT w/ Heel Touch w/ Surgical Shoe;   c) Continue w/ Local Wound Care; Q24 Dressing Changes;  - S/p Initial debridement 10/21  - S/p 10/24/22 excisional debridement of soft tissue and bone of left foot; (pt given 1u pRBCs pre-op)  - Planned for another attempt left lower extremity angiogram with endovascular revascularization via tibial stick 2/ occluded segment found on previous angiogram (discussed with vascular team 10/25) - scheduled Thursday 10/27 with Dr. Malik  - PLEASE OBTAIN VEIN MAPPING AND CARDIOLOGY CLEARANCE FOR POSSIBLE BYPASS ON THURSDAY (Cardio on board) -    - Plan for 3rd surgical debridement with wound VAC application after vascular procedure      #Dysuria - r/o UTI  - Pt already receiving meropenem - should cover most UTI causing bacteria  - 10/25: Given fluconazole with HD renal dosing in case of fungal origin   - UA 10/23 WBC 13, nitrite negative, LEC small, bacteria negative   - F/u urine cx   - Kidney & Bladder sonogram (-) for hydronephrosis     # ESRD on HD  - HD as per nephrology   - C/w darbepoetin     # CAD s/p stent and CABG   - on statin   - Discuss with cardio. we're unable to hold aspirin. Cont aspirin   - holding plavix since 10/16  - continue BB  - Cardio consult appreciated. at moderate risk for surgery     # DM II  - hypoglycemic this am  - Lantus  15 units HS     # Constipation   - Cont miralax daily, senna  - Last BM 10/23/22 - will consider adding milk of magnesium if continued     #Misc  - Diet: DASH  - GI: PPI  - DVT: Lovenox  - Dispo: Medicine   63 y/o with a PMHx of DM, HTN, ESRD (on dialysis M/W/F, last session on 10/14), CAD s/p 1 stent on 2008 and quadruple CABG on 2012, PAD s/p bilateral angioplasty at [facility], was transferred from [facility] complaining of weakness and unhealed wound in the left foot.    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 hx of PAD   - IR for angiogram 10/20/22: a) Left lower extremity angiogram demonstrating chronically occluded PT and AT with patent anterior tibial vein bypass reconstituting into the DP. b) No microvascular outflow demonstrates within the calcaneous. No hemodynamically significant stenosis within the left SFA and popliteal artery. No intervention performed  - Blood cx 10/15:NTD   - Wound Cx 9/16 - Enterobacter cloacae  - treated with two weeks post-HD vancomycin + cefepime  - wound CX 10/16 VRE Faecim, Enterobacter cloacae complex, Proteus vulgaris, Morganella morganii   - s/p debridement 10/21 Wound Cx Proteus mirabilis, VRE faecium, Pseudomonas putida  - MRI L foot 10/17: a) Osteomyelitis and erosion of the calcaneus with a vertical pathologic fracture extending to the subtalar joint. Gangrene of overlying subcutaneous tissues. 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  - ID consult appreciated - will likely need at least 6 weeks from last debridement for calcaneal OM  - Cont meropenem 500mg IV daily   - Per ID: add daptomycin 12 mg/kg q 48 hours to cover VRE faecium   - Podiatry consult:  a) Local Wound Care; Wound Flushed w/ NS; Wound Packed w/ xeroform/ ABD x2 / DSD / Kerlix / Ace bandage  b) Weight Bearing Status; WBAT w/ Heel Touch w/ Surgical Shoe;   c) Continue w/ Local Wound Care; Q24 Dressing Changes;  - S/p Initial debridement 10/21  - S/p 10/24/22 excisional debridement of soft tissue and bone of left foot; (pt given 1u pRBCs pre-op)  - Planned for another attempt left lower extremity angiogram with endovascular revascularization via tibial stick 2/ occluded segment found on previous angiogram (discussed with vascular team 10/25) - scheduled Thursday 10/27 with Dr. Malik  - CBC, CMP, Mg, Coags, T&S ordered. COVID 10/26 (-)  - Per vascular: PLEASE OBTAIN VEIN MAPPING AND CARDIOLOGY CLEARANCE FOR POSSIBLE BYPASS ON THURSDAY (Cardio on board) - Dr. Magdaleno aware. Cardiac clearance for debridement obtained 10/19 for debridement pre-op determined patient to be at moderate cardiac risk.   - Plan for 3rd surgical debridement with wound VAC application after vascular procedure    #Dysuria - r/o UTI  - Pt already receiving meropenem - should cover most UTI causing bacteria  - 10/25: Given fluconazole with HD renal dosing in case of fungal origin   - UA 10/23 WBC 13, nitrite negative, LEC small, bacteria negative   - F/u urine cx   - Kidney & Bladder sonogram (-) for hydronephrosis     # ESRD on HD  - HD as per nephrology   - C/w darbepoetin     # CAD s/p stent and CABG   - on statin   - Discuss with cardio. we're unable to hold aspirin. Cont aspirin   - holding plavix since 10/16  - continue BB  - Cardio consult 10/19 appreciated at moderate risk for surgery     # DM II  - hypoglycemic this am  - Lantus  15 units HS     # Constipation   - Cont miralax daily, senna  - Last BM 10/23/22 - will consider adding milk of magnesium if continued     #Misc  - Diet: DASH  - GI: PPI  - DVT: Heparin subq  - Dispo: Medicine   61 y/o with a PMHx of DM, HTN, ESRD (on dialysis M/W/F, last session on 10/14), CAD s/p 1 stent on 2008 and quadruple CABG on 2012, PAD s/p bilateral angioplasty at [facility], was transferred from [facility] complaining of weakness and unhealed wound in the left foot.    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 hx of PAD   - IR for angiogram 10/20/22: a) Left lower extremity angiogram demonstrating chronically occluded PT and AT with patent anterior tibial vein bypass reconstituting into the DP. b) No microvascular outflow demonstrates within the calcaneous. No hemodynamically significant stenosis within the left SFA and popliteal artery. No intervention performed  - Blood cx 10/15:NTD   - Wound Cx 9/16 - Enterobacter cloacae  - treated with two weeks post-HD vancomycin + cefepime  - wound CX 10/16 VRE Faecim, Enterobacter cloacae complex, Proteus vulgaris, Morganella morganii   - s/p debridement 10/21 Wound Cx Proteus mirabilis, VRE faecium, Pseudomonas putida  - MRI L foot 10/17: a) Osteomyelitis and erosion of the calcaneus with a vertical pathologic fracture extending to the subtalar joint. Gangrene of overlying subcutaneous tissues. 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  - ID consult appreciated - will likely need at least 6 weeks from last debridement for calcaneal OM  - Cont meropenem 500mg IV daily   - C/w daptomycin 12 mg/kg q 48 hours to cover VRE faecium   - Creatine Kinase, Serum: 15 U/L (10.26.22 @ 06:53) -- check CK weekly 2/ daptomycin use (next check 11/2)  - Podiatry consult:  a) Local Wound Care; Wound Flushed w/ NS; Wound Packed w/ xeroform/ ABD x2 / DSD / Kerlix / Ace bandage  b) Weight Bearing Status; WBAT w/ Heel Touch w/ Surgical Shoe;   c) Continue w/ Local Wound Care; Q24 Dressing Changes;  - S/p Initial debridement 10/21  - S/p 10/24/22 excisional debridement of soft tissue and bone of left foot; (pt given 1u pRBCs pre-op)  - Planned for another attempt left lower extremity angiogram with endovascular revascularization via tibial stick 2/ occluded segment found on previous angiogram (discussed with vascular team 10/25) - scheduled Thursday 10/27 with Dr. Malik  - CBC, CMP, Mg, Coags, T&S ordered. COVID 10/26 (-)  - Per vascular: PLEASE OBTAIN VEIN MAPPING AND CARDIOLOGY CLEARANCE FOR POSSIBLE BYPASS ON THURSDAY (Cardio on board)   - Cardiac clearance for debridement obtained 10/19 for debridement pre-op determined patient to be at moderate cardiac risk.   - Plan for 3rd surgical debridement with wound VAC application after vascular procedure    #Dysuria - r/o UTI  - Pt already receiving meropenem - should cover most UTI causing bacteria  - 10/25 Kidney & Bladder sonogram (-) for hydronephrosis   - C/w fluconazole (10/25-present) with HD renal dosing in case of fungal origin   - EKG 10/25: . Will Check daily EKG to ensure fluconazole not worsening prolongation   - UA 10/23 WBC 13, nitrite negative, LEC small, bacteria negative   - F/u urine cx (specimen received)    # ESRD on HD  - HD as per nephrology   - C/w darbepoetin     # CAD s/p stent and CABG   - on statin   - Discuss with cardio. we're unable to hold aspirin. Cont aspirin   - holding plavix since 10/16  - continue BB  - Cardio consult 10/19 appreciated at moderate risk for surgery     # DM II  - Follow FS  - Insulin regimen     # Constipation   - Cont miralax daily, senna  - will consider adding milk of magnesium if continued     #Misc  - Diet: DASH  - GI: PPI  - DVT: Heparin subq  - Dispo: Medicine

## 2022-10-26 NOTE — PROGRESS NOTE ADULT - SUBJECTIVE AND OBJECTIVE BOX
INTERVAL HISTORY: No overnight events.  	  MEDICATIONS:  acetaminophen     Tablet .. 650 milliGRAM(s) Oral every 6 hours PRN  aluminum hydroxide/magnesium hydroxide/simethicone Suspension 30 milliLiter(s) Oral every 4 hours PRN  aspirin enteric coated 81 milliGRAM(s) Oral daily  atorvastatin 40 milliGRAM(s) Oral at bedtime  Dakins Solution - 1/2 Strength 1 Application(s) Topical daily  DAPTOmycin IVPB 1000 milliGRAM(s) IV Intermittent every 48 hours  darbepoetin Injectable Syringe 40 MICROGram(s) IV Push <User Schedule>  fluconAZOLE   Tablet 100 milliGRAM(s) Oral every 24 hours  glucagon  Injectable 1 milliGRAM(s) IntraMuscular once  heparin   Injectable 5000 Unit(s) SubCutaneous every 12 hours  influenza   Vaccine 0.5 milliLiter(s) IntraMuscular once  insulin glargine Injectable (LANTUS) 12 Unit(s) SubCutaneous at bedtime  insulin lispro (ADMELOG) corrective regimen sliding scale   SubCutaneous three times a day before meals  melatonin 3 milliGRAM(s) Oral at bedtime PRN  meropenem  IVPB 500 milliGRAM(s) IV Intermittent every 24 hours  metoprolol tartrate 50 milliGRAM(s) Oral two times a day  NIFEdipine XL 30 milliGRAM(s) Oral daily  ondansetron Injectable 4 milliGRAM(s) IV Push every 8 hours PRN  phenazopyridine 100 milliGRAM(s) Oral three times a day  polyethylene glycol 3350 17 Gram(s) Oral daily  senna 1 Tablet(s) Oral two times a day  sevelamer carbonate 800 milliGRAM(s) Oral three times a day      REVIEW OF SYSTEMS:  CONSTITUTIONAL: No fever, weight loss, or fatigue  NECK: No pain or stiffness  RESPIRATORY: No cough, wheezing, chills or hemoptysis; No shortness of breath  CARDIOVASCULAR: No chest pain, palpitations, dizziness, or leg swelling  GASTROINTESTINAL: No abdominal or epigastric pain. No nausea, vomiting, or hematemesis; No diarrhea or constipation. No melena or hematochezia.  GENITOURINARY: No dysuria, frequency, hematuria, or incontinence  NEUROLOGICAL: No headaches, memory loss, loss of strength, numbness, or tremors  SKIN: No itching, burning, rashes, or lesions   ENDOCRINE: No heat or cold intolerance; No hair loss  MUSCULOSKELETAL: No joint pain or swelling; No muscle, back, or extremity pain  HEME/LYMPH: No easy bruising, or bleeding gums    PHYSICAL EXAM:  T(C): 35.8 (10-26-22 @ 13:38), Max: 36.4 (10-26-22 @ 05:10)  HR: 77 (10-26-22 @ 13:38) (58 - 88)  BP: 164/74 (10-26-22 @ 13:38) (154/72 - 172/69)  RR: 18 (10-26-22 @ 13:38) (18 - 19)  SpO2: --  Wt(kg): --  I&O's Summary    25 Oct 2022 07:01  -  26 Oct 2022 07:00  --------------------------------------------------------  IN: 420 mL / OUT: 1200 mL / NET: -780 mL    26 Oct 2022 07:01  -  26 Oct 2022 19:47  --------------------------------------------------------  IN: 0 mL / OUT: 2000 mL / NET: -2000 mL          GENERAL: NAD, well-groomed, well-developed  HEAD:  Atraumatic, Normocephalic  NECK: Supple, No JVD, Normal thyroid  NERVOUS SYSTEM:  Alert & Oriented X3, Good concentration  CHEST/LUNG: Clear to percussion bilaterally; No rales, rhonchi, wheezing, or rubs  HEART: Regular rate and rhythm; No murmurs, rubs, or gallops  ABDOMEN: Soft, Nontender, Nondistended; Bowel sounds present  EXTREMITIES:  2+ Peripheral Pulses, No clubbing, cyanosis, or edema  SKIN: No rashes or lesions    LABS:	 	    CARDIAC MARKERS ( 26 Oct 2022 06:53 )  x     / x     / 15 U/L / x     / x                                7.9    10.23 )-----------( 325      ( 26 Oct 2022 06:53 )             23.6     10-26    137  |  98  |  47<H>  ----------------------------<  87  4.9   |  28  |  5.7<HH>    Ca    8.2<L>      26 Oct 2022 06:53  Phos  3.7     10-25  Mg     2.0     10-26    TPro  6.5  /  Alb  2.5<L>  /  TBili  0.3  /  DBili  x   /  AST  11  /  ALT  <5  /  AlkPhos  162<H>  10-26    proBNP:   Lipid Profile:

## 2022-10-26 NOTE — PROGRESS NOTE ADULT - SUBJECTIVE AND OBJECTIVE BOX
Nephrology Progress Note    SCHWABACHER, LAWRENCE  MRN-[id number]  62y  Male    S:  Patient is seen and examined, events over the last 24h noted.    O:  Allergies:  No Known Allergies    Hospital Medications:   MEDICATIONS  (STANDING):  aspirin enteric coated 81 milliGRAM(s) Oral daily  atorvastatin 40 milliGRAM(s) Oral at bedtime  Dakins Solution - 1/2 Strength 1 Application(s) Topical daily  DAPTOmycin IVPB 1000 milliGRAM(s) IV Intermittent every 48 hours  darbepoetin Injectable Syringe 25 MICROGram(s) IV Push <User Schedule>  fluconAZOLE   Tablet 100 milliGRAM(s) Oral every 24 hours  glucagon  Injectable 1 milliGRAM(s) IntraMuscular once  heparin   Injectable 5000 Unit(s) SubCutaneous every 12 hours  influenza   Vaccine 0.5 milliLiter(s) IntraMuscular once  insulin glargine Injectable (LANTUS) 12 Unit(s) SubCutaneous at bedtime  insulin lispro (ADMELOG) corrective regimen sliding scale   SubCutaneous three times a day before meals  meropenem  IVPB 500 milliGRAM(s) IV Intermittent every 24 hours  metoprolol tartrate 50 milliGRAM(s) Oral two times a day  phenazopyridine 100 milliGRAM(s) Oral three times a day  polyethylene glycol 3350 17 Gram(s) Oral daily  senna 1 Tablet(s) Oral two times a day  sevelamer carbonate 800 milliGRAM(s) Oral three times a day    MEDICATIONS  (PRN):  acetaminophen     Tablet .. 650 milliGRAM(s) Oral every 6 hours PRN Temp greater or equal to 38C (100.4F), Mild Pain (1 - 3)  aluminum hydroxide/magnesium hydroxide/simethicone Suspension 30 milliLiter(s) Oral every 4 hours PRN Dyspepsia  melatonin 3 milliGRAM(s) Oral at bedtime PRN Insomnia  ondansetron Injectable 4 milliGRAM(s) IV Push every 8 hours PRN Nausea and/or Vomiting    Home Medications:  aspirin 81 mg oral tablet: 1 tab(s) orally once a day (14 Sep 2022 17:42)  furosemide 40 mg oral tablet: 2 tab(s) orally once a day (14 Sep 2022 17:42)  Levemir 100 units/mL subcutaneous solution: 40 unit(s) subcutaneous once a day (at bedtime) (14 Sep 2022 17:42)  Metoprolol Tartrate 50 mg oral tablet: 1 tab(s) orally 2 times a day (14 Sep 2022 17:42)  Plavix 75 mg oral tablet: 1 tab(s) orally once a day (14 Sep 2022 17:42)  sevelamer carbonate 800 mg oral tablet: 1 tab(s) orally 3 times a day (with meals) (20 Sep 2022 11:36)  Trulicity Pen 1.5 mg/0.5 mL subcutaneous solution: 1.5  subcutaneous once a week (14 Sep 2022 17:42)      VITALS:  T(F): 96.5 (10-26-22 @ 13:38), Max: 97.5 (10-26-22 @ 05:10)  HR: 77 (10-26-22 @ 13:38)  BP: 164/74 (10-26-22 @ 13:38)  RR: 18 (10-26-22 @ 13:38)  SpO2: --  Wt(kg): --  I&O's Detail    25 Oct 2022 07:  -  26 Oct 2022 07:00  --------------------------------------------------------  IN:    Oral Fluid: 420 mL  Total IN: 420 mL    OUT:    Voided (mL): 1200 mL  Total OUT: 1200 mL    Total NET: -780 mL      26 Oct 2022 07:  -  26 Oct 2022 15:00  --------------------------------------------------------  IN:  Total IN: 0 mL    OUT:    Other (mL): 2000 mL  Total OUT: 2000 mL    Total NET: -2000 mL        I&O's Summary    25 Oct 2022 07:  -  26 Oct 2022 07:00  --------------------------------------------------------  IN: 420 mL / OUT: 1200 mL / NET: -780 mL    26 Oct 2022 07:01  -  26 Oct 2022 15:00  --------------------------------------------------------  IN: 0 mL / OUT: 2000 mL / NET: -2000 mL          PHYSICAL EXAM:  Gen: NAD  Resp: CTAB  Card: S1/S2  Abd: soft  Vascular access: LUE AVF       LABS:      10-26    137  |  98  |  47<H>  ----------------------------<  87  4.9   |  28  |  5.7<HH>    Ca    8.2<L>      26 Oct 2022 06:53  Phos  3.7     10-25  Mg     2.0     10-26    TPro  6.5  /  Alb  2.5<L>  /  TBili  0.3  /  DBili      /  AST  11  /  ALT  <5  /  AlkPhos  162<H>  10-26    Phosphorus Level, Serum: 3.7 mg/dL (10-25-22 @ 07:24)  Phosphorus Level, Serum: 4.5 mg/dL (10-21-22 @ 10:30)    Vitamin D, 25-Hydroxy: 21 ng/mL (22 @ 16:00)  Intact PTH: 312 pg/mL (22 @ 16:00)                          7.9    10.23 )-----------( 325      ( 26 Oct 2022 06:53 )             23.6     Mean Cell Volume: 95.2 fL (10-26-22 @ 06:53)    Iron Total, Serum: 39 ug/dL (10-21-22 @ 10:30)  % Saturation, Iron: 29 % (10-21-22 @ 10:30)  Ferritin, Serum: 1126 ng/mL (10.21.22 @ 10:30)          Urine Studies:  Urinalysis Basic - ( 23 Oct 2022 18:53 )    Color: Yellow / Appearance: Slightly Turbid / S.011 / pH:   Gluc:  / Ketone: Negative  / Bili: Negative / Urobili: <2 mg/dL   Blood:  / Protein: 100 mg/dL / Nitrite: Negative   Leuk Esterase: Small / RBC: 6 /HPF / WBC 13 /HPF   Sq Epi:  / Non Sq Epi: 9 /HPF / Bacteria: Negative          Culture Results:   Moderate Proteus mirabilis  Rare Pseudomonas putida group  Rare Enterococcus faecium (vancomycin resistant)  Rare Staphylococcus haemolyticus (10-21 @ 14:05)  Culture Results:   Rare Proteus vulgaris group  Few Enterococcus faecium (vancomycin resistant)  Rare Pseudomonas putida group  Rare Coag Negative Staphylococcus "Susceptibilities not performed" (10-21 @ 14:04)    Creatinine trend:  Creatinine, Serum: 5.7 mg/dL (10-26-22 @ 06:53)  Creatinine, Serum: 4.3 mg/dL (10-25-22 @ 07:24)  Creatinine, Serum: 6.7 mg/dL (10-24-22 @ 07:25)  Creatinine, Serum: 6.0 mg/dL (10-23-22 @ 10:17)  Creatinine, Serum: 4.9 mg/dL (10-22-22 @ 09:52)  Creatinine, Serum: 5.8 mg/dL (10-21-22 @ 10:30)

## 2022-10-26 NOTE — PROGRESS NOTE ADULT - SUBJECTIVE AND OBJECTIVE BOX
SCHWABACHER, LAWRENCE  62y  [facility] F3-4B 014 B    Patient is a 62y old  Male who presents with a Sepsis (19 Oct 2022 14:02)    INTERVAL HPI/OVERNIGHT EVENTS:  seen and examined this morning  feels well  HD today  angio in am      Vital Signs Last 24 Hrs  T(C): 35.8 (26 Oct 2022 13:38), Max: 36.4 (26 Oct 2022 05:10)  T(F): 96.5 (26 Oct 2022 13:38), Max: 97.5 (26 Oct 2022 05:10)  HR: 77 (26 Oct 2022 13:38) (58 - 88)  BP: 164/74 (26 Oct 2022 13:38) (154/72 - 172/69)  BP(mean): --  RR: 18 (26 Oct 2022 13:38) (18 - 19)  SpO2: --    Parameters below as of 26 Oct 2022 10:50  Patient On (Oxygen Delivery Method): room air      PHYSICAL EXAM:  GENERAL: NAD, well-groomed, well-developed  HEAD:  Atraumatic, Normocephalic  EYES: EOMI, PERRLA, conjunctiva and sclera clear  NERVOUS SYSTEM:  Alert & Oriented X 4, Good concentration; moves all extremities   CHEST/LUNG: Clear to percussion bilaterally; No rales, rhonchi, wheezing, or rubs  HEART: Regular rate and rhythm; No murmurs, rubs, or gallops  ABDOMEN: Soft, Nontender, Nondistended; Bowel sounds present  EXTREMITIES:  left foot dressing   SKIN: left food dressing    Consultant(s) Notes Reviewed:  [x ] YES  [ ] NO  Care Discussed with Consultants/Other Providers [ x] YES  [ ] NO    LABS:                        7.9    10.23 )-----------( 325      ( 26 Oct 2022 06:53 )             23.6     10-26    137  |  98  |  47<H>  ----------------------------<  87  4.9   |  28  |  5.7<HH>    Ca    8.2<L>      26 Oct 2022 06:53  Phos  3.7     10-25  Mg     2.0     10-26    TPro  6.5  /  Alb  2.5<L>  /  TBili  0.3  /  DBili  x   /  AST  11  /  ALT  <5  /  AlkPhos  162<H>  10-26    CAPILLARY BLOOD GLUCOSE  POCT Blood Glucose.: 155 mg/dL (25 Oct 2022 20:39)  POCT Blood Glucose.: 251 mg/dL (25 Oct 2022 16:46)      Culture - Abscess with Gram Stain (collected 16 Oct 2022 17:50)  Source: .Abscess left foot wound  Preliminary Report (18 Oct 2022 19:12):    Rare Morganella morganii    Rare Enterobacter cloacae complex    Rare Proteus vulgaris group    Rare Enterococcus faecium (vancomycin resistant)  Organism: Morganella morganii  Enterobacter cloacae complex  Proteus vulgaris group  Enterococcus faecium (vancomycin resistant) (18 Oct 2022 19:11)  Organism: Enterococcus faecium (vancomycin resistant) (18 Oct 2022 19:11)  Organism: Proteus vulgaris group (18 Oct 2022 19:05)  Organism: Enterobacter cloacae complex (18 Oct 2022 19:05)  Organism: Morganella morganii (18 Oct 2022 19:05)      MEDICATIONS  (STANDING):  aspirin enteric coated 81 milliGRAM(s) Oral daily  atorvastatin 40 milliGRAM(s) Oral at bedtime  Dakins Solution - 1/2 Strength 1 Application(s) Topical daily  DAPTOmycin IVPB 1000 milliGRAM(s) IV Intermittent every 48 hours  darbepoetin Injectable Syringe 40 MICROGram(s) IV Push <User Schedule>  fluconAZOLE   Tablet 100 milliGRAM(s) Oral every 24 hours  glucagon  Injectable 1 milliGRAM(s) IntraMuscular once  heparin   Injectable 5000 Unit(s) SubCutaneous every 12 hours  influenza   Vaccine 0.5 milliLiter(s) IntraMuscular once  insulin glargine Injectable (LANTUS) 12 Unit(s) SubCutaneous at bedtime  insulin lispro (ADMELOG) corrective regimen sliding scale   SubCutaneous three times a day before meals  meropenem  IVPB 500 milliGRAM(s) IV Intermittent every 24 hours  metoprolol tartrate 50 milliGRAM(s) Oral two times a day  NIFEdipine XL 30 milliGRAM(s) Oral daily  phenazopyridine 100 milliGRAM(s) Oral three times a day  polyethylene glycol 3350 17 Gram(s) Oral daily  senna 1 Tablet(s) Oral two times a day  sevelamer carbonate 800 milliGRAM(s) Oral three times a day    MEDICATIONS  (PRN):  acetaminophen     Tablet .. 650 milliGRAM(s) Oral every 6 hours PRN Temp greater or equal to 38C (100.4F), Mild Pain (1 - 3)  aluminum hydroxide/magnesium hydroxide/simethicone Suspension 30 milliLiter(s) Oral every 4 hours PRN Dyspepsia  melatonin 3 milliGRAM(s) Oral at bedtime PRN Insomnia  ondansetron Injectable 4 milliGRAM(s) IV Push every 8 hours PRN Nausea and/or Vomiting

## 2022-10-26 NOTE — PROGRESS NOTE ADULT - ASSESSMENT
ASSESSMENT  61 y/o with a PMHx of DM, HTN, ESRD (on dialysis M/W/F, last session on 10/14), CAD s/p 1 stent on 2008 and quadruple CABG on 2012, PAD s/p bilateral angioplasty at [facility], was transferred from [facility] complaining of weakness and unhealed wound in the left foot, found to have OM of calcaneus bone and ESBL E coli bacteremia.    IMPRESSION  #Left Calcaneous OM  - s/p removal of FB 9/16  - Wound Cx 9/16 - Enterobacter cloacae  - treated with two weeks post-HD vancomycin + cefepime  - wound CX 10/16 VRE Faecim, Enterobacter cloacae complex, Proteus vulgaris, Morganella morganii   - s/p debridement 10/21 Wound Cx Proteus mirabilis, VRE faecium, Pseudomonas putida  - s/p debridement 10/24    #Dysuria     #ESBL E coli Bacteremia at [facility]     #ESRD on HD  #CAD s/p CABG  #PAD s/p bilateral angioplasty  #DM  #Abx allergy:      RECOMMENDATIONS  - continue meropenem 500 mg q 24 hours   - continue daptomycin 12 mg/kg q 48 hours to cover VRE faecium  -- Creatine Kinase, Serum: 15 U/L (10.26.22 @ 06:53) -- check CK weekly  - on fluconazole 100 mg daily for possible Candida UTI -- reports dysuria is resolved today   - QTC 10/25 if >500 -- please check EKG daily to ensure that this is not worsening   - follow-up urine Cx  - will likely need at least 6 weeks from last debridement  calcaneal OM       Please call or message on Microsoft Teams if with any questions.  Spectra [phone]

## 2022-10-26 NOTE — PROGRESS NOTE ADULT - SUBJECTIVE AND OBJECTIVE BOX
LAWRENCE SCHWABACHER 62y Male  MRN#: [id number]     Hospital Day: 12d    Pt is currently admitted with the primary diagnosis of  BACTEREMIA        HOSPITAL COURSE:     63 y/o with a PMHx of DM, HTN, ESRD (on dialysis M/W/F), CAD s/p 1 stent (2008) and 4x CABG (2012), PAD s/p bilateral angioplasty at [facility], was transferred from [facility] complaining of weakness and unhealed wound in the left foot.  Two months ago the patient inadvertently stepped on glass while cleaning his house injuring his left foot. His podiatrist removed glass fragments and sent him home on a week long course of antibiotics (patient unsure about which ABx). According to the patient the wound failed to improve, he progressively developed weakness on his legs, and recurring vomiting, which prompted him to go to [facility]'s ED 10/11/22. At [facility] patient was found to be septic and was started on IV Vancomycin 1000mg and Meropenem 500mg. Blood cultures grew ESBL E.Coli. An MRI of the foot showed "osteomyelitis of the calcaneus bone, with what appears to be a subacute fracture". Patient was evaluated by vascular surgery and podiatry at [facility], who recommended debriding the L foot wound. Patient requested to be transferred to [facility] where his vascular surgeons are. Pt underwent initial debridement 10/21,  second debridement 10/24/22. Continuing on meropenem. Added daptomycin for VRE coverage. Added fluconazole as patient is having significant dysuria. Patient is to have vascular procedure for occluded segment 10/27/22 with third DFU debridement to follow.     SUBJECTIVE     Overnight events  None    Subjective complaints  Pt was evaluated at bedside. Pt denied active complaints.                                             ----------------------------------------------------------  OBJECTIVE  PAST MEDICAL & SURGICAL HISTORY  CAD (coronary artery disease)  1 stent 2008    S/P CABG (coronary artery bypass graft)  x4    Diabetes mellitus    Transient ischemic attack (TIA)  2017; 2008    Chronic kidney disease (CKD)  Stage IV    Hypertension    Stented coronary artery  in 2008    Myocardial infarction  2012    PAD (peripheral artery disease)  S/p bypass left leg    HLD (hyperlipidemia)    BPH (benign prostatic hyperplasia)    Pain in left knee  s/p fall    OA (osteoarthritis)    [place] (hard of hearing)    Chronic anemia    S/P CABG (coronary artery bypass graft)  2012    H/O arterial bypass of lower limb  Left Lower Extremity (2016)    History of surgery  Left CEA (2017)  Left Pinkie toe Amputation (2014)  CABG x 4 (2012)  Card cath - stent (2008)      AV fistula  2019  LEFT AV FISTULA                                              -----------------------------------------------------------  ALLERGIES:  No Known Allergies                                            ------------------------------------------------------------    HOME MEDICATIONS  Home Medications:  aspirin 81 mg oral tablet: 1 tab(s) orally once a day (14 Sep 2022 17:42)  furosemide 40 mg oral tablet: 2 tab(s) orally once a day (14 Sep 2022 17:42)  Levemir 100 units/mL subcutaneous solution: 40 unit(s) subcutaneous once a day (at bedtime) (14 Sep 2022 17:42)  Metoprolol Tartrate 50 mg oral tablet: 1 tab(s) orally 2 times a day (14 Sep 2022 17:42)  Plavix 75 mg oral tablet: 1 tab(s) orally once a day (14 Sep 2022 17:42)  sevelamer carbonate 800 mg oral tablet: 1 tab(s) orally 3 times a day (with meals) (20 Sep 2022 11:36)  Trulicity Pen 1.5 mg/0.5 mL subcutaneous solution: 1.5  subcutaneous once a week (14 Sep 2022 17:42)                           MEDICATIONS:  STANDING MEDICATIONS  aspirin enteric coated 81 milliGRAM(s) Oral daily  atorvastatin 40 milliGRAM(s) Oral at bedtime  Dakins Solution - 1/2 Strength 1 Application(s) Topical daily  DAPTOmycin IVPB 1000 milliGRAM(s) IV Intermittent every 48 hours  darbepoetin Injectable Syringe 25 MICROGram(s) IV Push <User Schedule>  fluconAZOLE   Tablet 100 milliGRAM(s) Oral every 24 hours  glucagon  Injectable 1 milliGRAM(s) IntraMuscular once  heparin   Injectable 5000 Unit(s) SubCutaneous every 12 hours  influenza   Vaccine 0.5 milliLiter(s) IntraMuscular once  insulin glargine Injectable (LANTUS) 12 Unit(s) SubCutaneous at bedtime  insulin lispro (ADMELOG) corrective regimen sliding scale   SubCutaneous three times a day before meals  meropenem  IVPB 500 milliGRAM(s) IV Intermittent every 24 hours  metoprolol tartrate 50 milliGRAM(s) Oral two times a day  polyethylene glycol 3350 17 Gram(s) Oral daily  senna 1 Tablet(s) Oral two times a day  sevelamer carbonate 800 milliGRAM(s) Oral three times a day    PRN MEDICATIONS  acetaminophen     Tablet .. 650 milliGRAM(s) Oral every 6 hours PRN  aluminum hydroxide/magnesium hydroxide/simethicone Suspension 30 milliLiter(s) Oral every 4 hours PRN  melatonin 3 milliGRAM(s) Oral at bedtime PRN  ondansetron Injectable 4 milliGRAM(s) IV Push every 8 hours PRN                                            ------------------------------------------------------------  VITAL SIGNS: Last 24 Hours  T(C): 36.4 (26 Oct 2022 05:10), Max: 36.4 (26 Oct 2022 05:10)  T(F): 97.5 (26 Oct 2022 05:10), Max: 97.5 (26 Oct 2022 05:10)  HR: 78 (26 Oct 2022 05:10) (58 - 84)  BP: 167/77 (26 Oct 2022 05:10) (151/67 - 172/69)  BP(mean): --  RR: 18 (26 Oct 2022 05:10) (18 - 19)  SpO2: --      10-24-22 @ 07:01  -  10-25-22 @ 07:00  --------------------------------------------------------  IN: 315 mL / OUT: 3000 mL / NET: -2685 mL    10-25-22 @ 07:01  -  10-26-22 @ 06:13  --------------------------------------------------------  IN: 420 mL / OUT: 1200 mL / NET: -780 mL                                             --------------------------------------------------------------  LABS:                        8.4    10.43 )-----------( 324      ( 25 Oct 2022 07:24 )             25.2     10-25    136  |  97<L>  |  33<H>  ----------------------------<  108<H>  4.5   |  28  |  4.3<HH>    Ca    8.2<L>      25 Oct 2022 07:24  Phos  3.7     10-25  Mg     2.0     10-25    TPro  6.6  /  Alb  2.8<L>  /  TBili  0.3  /  DBili  x   /  AST  12  /  ALT  6   /  AlkPhos  151<H>  10-25                                                              -------------------------------------------------------------  RADIOLOGY:                                            --------------------------------------------------------------    PHYSICAL EXAM:  GENERAL: NAD, lying in bed comfortably  HEENT:  Atraumatic, Normocephalic  NECK: Supple, trachea midline  CHEST/LUNG: BL BS CTA. Unlabored respirations  HEART: S1S2 audible; RRR,  ABDOMEN: Soft, Nontender, Nondistended.    EXTREMITIES: Warm, LLE swelling  NERVOUS SYSTEM:  Awake and alert.   SKIN: left foot swollen and wrapped                                            --------------------------------------------------------------

## 2022-10-26 NOTE — PROGRESS NOTE ADULT - SUBJECTIVE AND OBJECTIVE BOX
GENERAL SURGERY PROGRESS NOTE    Patient: SCHWABACHER, LAWRENCE , 62y ([birth year])Male   MRN: [id number]  Location: [address]  Visit: 10-14-22 Inpatient  Date: 10-26-22 @ 16:59      Procedure/Dx/Injuries:  L nonhealing foot ulcer with history of PAD.    Events of past 24 hours: no acute events, vein mapping    PAST MEDICAL & SURGICAL HISTORY:  CAD (coronary artery disease)  1 stent 2008      S/P CABG (coronary artery bypass graft)  x4      Diabetes mellitus      Transient ischemic attack (TIA)  2017; 2008      Chronic kidney disease (CKD)  Stage IV      Hypertension      Stented coronary artery  in 2008      Myocardial infarction  2012      PAD (peripheral artery disease)  S/p bypass left leg      HLD (hyperlipidemia)      BPH (benign prostatic hyperplasia)      Pain in left knee  s/p fall      OA (osteoarthritis)      [place] (hard of hearing)      Chronic anemia      S/P CABG (coronary artery bypass graft)  2012      H/O arterial bypass of lower limb  Left Lower Extremity (2016)      History of surgery  Left CEA (2017)  Left Pinkie toe Amputation (2014)  CABG x 4 (2012)  Card cath - stent (2008)        AV fistula  2019  LEFT AV FISTULA          Vitals:   T(F): 96.5 (10-26-22 @ 13:38), Max: 97.5 (10-26-22 @ 05:10)  HR: 77 (10-26-22 @ 13:38)  BP: 164/74 (10-26-22 @ 13:38)  RR: 18 (10-26-22 @ 13:38)  SpO2: --      Diet, NPO after Midnight:      NPO Start Date: 26-Oct-2022,   NPO Start Time: 23:59  Diet, DASH/TLC:   Sodium & Cholesterol Restricted  For patients receiving Renal Replacement - No Protein Restr, No Conc K, No Conc Phos, Low Sodium      Fluids:     I & O's:    10-25-22 @ 07:01  -  10-26-22 @ 07:00  --------------------------------------------------------  IN:    Oral Fluid: 420 mL  Total IN: 420 mL    OUT:    Voided (mL): 1200 mL  Total OUT: 1200 mL    Total NET: -780 mL          PHYSICAL EXAM:  General Appearance: AAOX3, NAD  Heart: RRR  Lungs: CTAx2  Abdomen:  soft, non tender  MSK/Extremities:  mobile, L foot ulcer w wound dressing in place    MEDICATIONS  (STANDING):  aspirin enteric coated 81 milliGRAM(s) Oral daily  atorvastatin 40 milliGRAM(s) Oral at bedtime  Dakins Solution - 1/2 Strength 1 Application(s) Topical daily  DAPTOmycin IVPB 1000 milliGRAM(s) IV Intermittent every 48 hours  darbepoetin Injectable Syringe 40 MICROGram(s) IV Push <User Schedule>  fluconAZOLE   Tablet 100 milliGRAM(s) Oral every 24 hours  glucagon  Injectable 1 milliGRAM(s) IntraMuscular once  heparin   Injectable 5000 Unit(s) SubCutaneous every 12 hours  influenza   Vaccine 0.5 milliLiter(s) IntraMuscular once  insulin glargine Injectable (LANTUS) 12 Unit(s) SubCutaneous at bedtime  insulin lispro (ADMELOG) corrective regimen sliding scale   SubCutaneous three times a day before meals  meropenem  IVPB 500 milliGRAM(s) IV Intermittent every 24 hours  metoprolol tartrate 50 milliGRAM(s) Oral two times a day  NIFEdipine XL 30 milliGRAM(s) Oral daily  phenazopyridine 100 milliGRAM(s) Oral three times a day  polyethylene glycol 3350 17 Gram(s) Oral daily  senna 1 Tablet(s) Oral two times a day  sevelamer carbonate 800 milliGRAM(s) Oral three times a day    MEDICATIONS  (PRN):  acetaminophen     Tablet .. 650 milliGRAM(s) Oral every 6 hours PRN Temp greater or equal to 38C (100.4F), Mild Pain (1 - 3)  aluminum hydroxide/magnesium hydroxide/simethicone Suspension 30 milliLiter(s) Oral every 4 hours PRN Dyspepsia  melatonin 3 milliGRAM(s) Oral at bedtime PRN Insomnia  ondansetron Injectable 4 milliGRAM(s) IV Push every 8 hours PRN Nausea and/or Vomiting      DVT PROPHYLAXIS: heparin   Injectable 5000 Unit(s) SubCutaneous every 12 hours    GI PROPHYLAXIS:   ANTICOAGULATION:   ANTIBIOTICS:  DAPTOmycin IVPB 1000 milliGRAM(s)  fluconAZOLE   Tablet 100 milliGRAM(s)  meropenem  IVPB 500 milliGRAM(s)            LAB/STUDIES:  Labs:  CAPILLARY BLOOD GLUCOSE      POCT Blood Glucose.: 155 mg/dL (25 Oct 2022 20:39)                          7.9    10.23 )-----------( 325      ( 26 Oct 2022 06:53 )             23.6         10-26    137  |  98  |  47<H>  ----------------------------<  87  4.9   |  28  |  5.7<HH>      Magnesium, Serum: 2.0 mg/dL (10-26-22 @ 06:53)      LFTs:             6.5  | 0.3  | 11       ------------------[162     ( 26 Oct 2022 06:53 )  2.5  | x    | <5          Lipase:x      Amylase:x             Coags:     12.50  ----< 1.09    ( 26 Oct 2022 06:53 )     34.3        CARDIAC MARKERS ( 26 Oct 2022 06:53 )  x     / x     / 15 U/L / x     / x                          IMAGING:        < from: [facility] Duplex Lower Ext Vein ScanNorman (10.26.22 @ 07:52) >  Measurements are as follows:    Right lower extremity:    Great saphenous vein:        Right groin SFA: 5.5 mm        Upper Thigh Mid: 2.5mm        Upper Thigh Distal:1.1 mm    Left lower extremity:    Great saphenous vein:        Upper Thigh Proximal: 3.4 mm        Upper Thigh Mid: 2.4 mm        Upper Thigh Distal: 1.4 mm      Impression:    Bilateral lower extremity vein mapping with the above measurements.    ICD-10: I73.89    < end of copied text >

## 2022-10-26 NOTE — PROGRESS NOTE ADULT - ASSESSMENT
61 y/o with a PMHx of DM, HTN, ESRD (on dialysis M/W/F, last session on 10/14), CAD s/p 1 stent on 2008 and quadruple CABG on 2012, PAD, s/p left pop-AT [facility] bypass 2020, s/p bilateral angioplasty at [facility], was transferred from [facility] complaining of weakness and unhealed wound in the left foot.    Vascular surgery consulted for L nonhealing foot ulcer with history of PAD.  Patient has intact, dopplerable signals in the LLE. In 9/15, patient had no evidence of arterial stenosis in the LLE.    Plan:  - Care by primary team  - Booked for left lower extremity angiogram with endovascular revascularization via tibial stick vs creation of LLE Bypass  - NPO at Midnight  - IVFs while NPO  - Pre-op labs done, pending covid result  - Will obtain consent  - Pending Cardiology risk stratification    spectra [phone]

## 2022-10-27 ENCOUNTER — TRANSCRIPTION ENCOUNTER (OUTPATIENT)
Age: 62
End: 2022-10-27

## 2022-10-27 LAB
ALBUMIN SERPL ELPH-MCNC: 2.7 G/DL — LOW (ref 3.5–5.2)
ALP SERPL-CCNC: 173 U/L — HIGH (ref 30–115)
ALT FLD-CCNC: 6 U/L — SIGNIFICANT CHANGE UP (ref 0–41)
ANION GAP SERPL CALC-SCNC: 13 MMOL/L — SIGNIFICANT CHANGE UP (ref 7–14)
APTT BLD: 30.8 SEC — SIGNIFICANT CHANGE UP (ref 27–39.2)
AST SERPL-CCNC: 14 U/L — SIGNIFICANT CHANGE UP (ref 0–41)
BASOPHILS # BLD AUTO: 0.1 K/UL — SIGNIFICANT CHANGE UP (ref 0–0.2)
BASOPHILS NFR BLD AUTO: 1.2 % — HIGH (ref 0–1)
BILIRUB SERPL-MCNC: 0.3 MG/DL — SIGNIFICANT CHANGE UP (ref 0.2–1.2)
BUN SERPL-MCNC: 33 MG/DL — HIGH (ref 10–20)
CALCIUM SERPL-MCNC: 8.4 MG/DL — SIGNIFICANT CHANGE UP (ref 8.4–10.5)
CHLORIDE SERPL-SCNC: 92 MMOL/L — LOW (ref 98–110)
CO2 SERPL-SCNC: 30 MMOL/L — SIGNIFICANT CHANGE UP (ref 17–32)
CREAT SERPL-MCNC: 4.3 MG/DL — CRITICAL HIGH (ref 0.7–1.5)
CULTURE RESULTS: NO GROWTH — SIGNIFICANT CHANGE UP
EGFR: 15 ML/MIN/1.73M2 — LOW
EOSINOPHIL # BLD AUTO: 0.14 K/UL — SIGNIFICANT CHANGE UP (ref 0–0.7)
EOSINOPHIL NFR BLD AUTO: 1.7 % — SIGNIFICANT CHANGE UP (ref 0–8)
GLUCOSE BLDC GLUCOMTR-MCNC: 121 MG/DL — HIGH (ref 70–99)
GLUCOSE BLDC GLUCOMTR-MCNC: 197 MG/DL — HIGH (ref 70–99)
GLUCOSE BLDC GLUCOMTR-MCNC: 94 MG/DL — SIGNIFICANT CHANGE UP (ref 70–99)
GLUCOSE SERPL-MCNC: 156 MG/DL — HIGH (ref 70–99)
HAV IGM SER-ACNC: SIGNIFICANT CHANGE UP
HBV CORE IGM SER-ACNC: SIGNIFICANT CHANGE UP
HBV SURFACE AG SER-ACNC: SIGNIFICANT CHANGE UP
HCT VFR BLD CALC: 24.8 % — LOW (ref 42–52)
HCV AB S/CO SERPL IA: 0.14 S/CO — SIGNIFICANT CHANGE UP (ref 0–0.99)
HCV AB SERPL-IMP: SIGNIFICANT CHANGE UP
HGB BLD-MCNC: 8.2 G/DL — LOW (ref 14–18)
IMM GRANULOCYTES NFR BLD AUTO: 0.6 % — HIGH (ref 0.1–0.3)
INR BLD: 1.04 RATIO — SIGNIFICANT CHANGE UP (ref 0.65–1.3)
LYMPHOCYTES # BLD AUTO: 0.99 K/UL — LOW (ref 1.2–3.4)
LYMPHOCYTES # BLD AUTO: 11.7 % — LOW (ref 20.5–51.1)
MAGNESIUM SERPL-MCNC: 2 MG/DL — SIGNIFICANT CHANGE UP (ref 1.8–2.4)
MCHC RBC-ENTMCNC: 32.4 PG — HIGH (ref 27–31)
MCHC RBC-ENTMCNC: 33.1 G/DL — SIGNIFICANT CHANGE UP (ref 32–37)
MCV RBC AUTO: 98 FL — HIGH (ref 80–94)
MONOCYTES # BLD AUTO: 0.63 K/UL — HIGH (ref 0.1–0.6)
MONOCYTES NFR BLD AUTO: 7.4 % — SIGNIFICANT CHANGE UP (ref 1.7–9.3)
NEUTROPHILS # BLD AUTO: 6.55 K/UL — HIGH (ref 1.4–6.5)
NEUTROPHILS NFR BLD AUTO: 77.4 % — HIGH (ref 42.2–75.2)
NRBC # BLD: 0 /100 WBCS — SIGNIFICANT CHANGE UP (ref 0–0)
PLATELET # BLD AUTO: 307 K/UL — SIGNIFICANT CHANGE UP (ref 130–400)
POTASSIUM SERPL-MCNC: 4.7 MMOL/L — SIGNIFICANT CHANGE UP (ref 3.5–5)
POTASSIUM SERPL-SCNC: 4.7 MMOL/L — SIGNIFICANT CHANGE UP (ref 3.5–5)
PROT SERPL-MCNC: 7.2 G/DL — SIGNIFICANT CHANGE UP (ref 6–8)
PROTHROM AB SERPL-ACNC: 11.9 SEC — SIGNIFICANT CHANGE UP (ref 9.95–12.87)
RBC # BLD: 2.53 M/UL — LOW (ref 4.7–6.1)
RBC # FLD: 14.7 % — HIGH (ref 11.5–14.5)
SODIUM SERPL-SCNC: 135 MMOL/L — SIGNIFICANT CHANGE UP (ref 135–146)
SPECIMEN SOURCE: SIGNIFICANT CHANGE UP
SURGICAL PATHOLOGY STUDY: SIGNIFICANT CHANGE UP
WBC # BLD: 8.46 K/UL — SIGNIFICANT CHANGE UP (ref 4.8–10.8)
WBC # FLD AUTO: 8.46 K/UL — SIGNIFICANT CHANGE UP (ref 4.8–10.8)

## 2022-10-27 PROCEDURE — 99233 SBSQ HOSP IP/OBS HIGH 50: CPT

## 2022-10-27 PROCEDURE — 93010 ELECTROCARDIOGRAM REPORT: CPT

## 2022-10-27 PROCEDURE — 75710 ARTERY X-RAYS ARM/LEG: CPT | Mod: 26,59

## 2022-10-27 PROCEDURE — 76937 US GUIDE VASCULAR ACCESS: CPT | Mod: 26

## 2022-10-27 PROCEDURE — 37232: CPT

## 2022-10-27 PROCEDURE — 37229: CPT | Mod: LT

## 2022-10-27 PROCEDURE — 36247 INS CATH ABD/L-EXT ART 3RD: CPT | Mod: 59

## 2022-10-27 RX ORDER — PHENAZOPYRIDINE HCL 100 MG
100 TABLET ORAL THREE TIMES A DAY
Refills: 0 | Status: DISCONTINUED | OUTPATIENT
Start: 2022-10-27 | End: 2022-10-28

## 2022-10-27 RX ORDER — SODIUM HYPOCHLORITE 0.125 %
1 SOLUTION, NON-ORAL MISCELLANEOUS DAILY
Refills: 0 | Status: ACTIVE | OUTPATIENT
Start: 2022-10-27 | End: 2023-09-25

## 2022-10-27 RX ORDER — NIFEDIPINE 30 MG
30 TABLET, EXTENDED RELEASE 24 HR ORAL DAILY
Refills: 0 | Status: ACTIVE | OUTPATIENT
Start: 2022-10-27 | End: 2023-09-25

## 2022-10-27 RX ORDER — ATORVASTATIN CALCIUM 80 MG/1
40 TABLET, FILM COATED ORAL AT BEDTIME
Refills: 0 | Status: ACTIVE | OUTPATIENT
Start: 2022-10-27 | End: 2023-09-25

## 2022-10-27 RX ORDER — ASPIRIN/CALCIUM CARB/MAGNESIUM 324 MG
81 TABLET ORAL DAILY
Refills: 0 | Status: ACTIVE | OUTPATIENT
Start: 2022-10-27 | End: 2023-09-25

## 2022-10-27 RX ORDER — MEROPENEM 1 G/30ML
1000 INJECTION INTRAVENOUS EVERY 24 HOURS
Refills: 0 | Status: COMPLETED | OUTPATIENT
Start: 2022-10-27 | End: 2022-11-02

## 2022-10-27 RX ORDER — ASPIRIN/CALCIUM CARB/MAGNESIUM 324 MG
325 TABLET ORAL ONCE
Refills: 0 | Status: COMPLETED | OUTPATIENT
Start: 2022-10-27 | End: 2022-10-27

## 2022-10-27 RX ORDER — INSULIN LISPRO 100/ML
VIAL (ML) SUBCUTANEOUS
Refills: 0 | Status: ACTIVE | OUTPATIENT
Start: 2022-10-27 | End: 2023-09-25

## 2022-10-27 RX ORDER — ACETAMINOPHEN 500 MG
650 TABLET ORAL EVERY 6 HOURS
Refills: 0 | Status: ACTIVE | OUTPATIENT
Start: 2022-10-27 | End: 2023-09-25

## 2022-10-27 RX ORDER — DARBEPOETIN ALFA IN POLYSORBAT 200MCG/0.4
40 PEN INJECTOR (ML) SUBCUTANEOUS
Refills: 0 | Status: ACTIVE | OUTPATIENT
Start: 2022-10-27 | End: 2023-09-25

## 2022-10-27 RX ORDER — SENNA PLUS 8.6 MG/1
1 TABLET ORAL
Refills: 0 | Status: DISCONTINUED | OUTPATIENT
Start: 2022-10-27 | End: 2022-11-01

## 2022-10-27 RX ORDER — SEVELAMER CARBONATE 2400 MG/1
800 POWDER, FOR SUSPENSION ORAL THREE TIMES A DAY
Refills: 0 | Status: ACTIVE | OUTPATIENT
Start: 2022-10-27 | End: 2023-09-25

## 2022-10-27 RX ORDER — LANOLIN ALCOHOL/MO/W.PET/CERES
3 CREAM (GRAM) TOPICAL AT BEDTIME
Refills: 0 | Status: ACTIVE | OUTPATIENT
Start: 2022-10-27 | End: 2023-09-25

## 2022-10-27 RX ORDER — HEPARIN SODIUM 5000 [USP'U]/ML
5000 INJECTION INTRAVENOUS; SUBCUTANEOUS EVERY 12 HOURS
Refills: 0 | Status: DISCONTINUED | OUTPATIENT
Start: 2022-10-27 | End: 2022-10-31

## 2022-10-27 RX ORDER — MORPHINE SULFATE 50 MG/1
4 CAPSULE, EXTENDED RELEASE ORAL
Refills: 0 | Status: DISCONTINUED | OUTPATIENT
Start: 2022-10-27 | End: 2022-10-27

## 2022-10-27 RX ORDER — INSULIN GLARGINE 100 [IU]/ML
12 INJECTION, SOLUTION SUBCUTANEOUS AT BEDTIME
Refills: 0 | Status: ACTIVE | OUTPATIENT
Start: 2022-10-27 | End: 2023-09-25

## 2022-10-27 RX ORDER — SODIUM CHLORIDE 9 MG/ML
1000 INJECTION, SOLUTION INTRAVENOUS
Refills: 0 | Status: DISCONTINUED | OUTPATIENT
Start: 2022-10-27 | End: 2022-10-27

## 2022-10-27 RX ORDER — ONDANSETRON 8 MG/1
4 TABLET, FILM COATED ORAL EVERY 8 HOURS
Refills: 0 | Status: ACTIVE | OUTPATIENT
Start: 2022-10-27 | End: 2023-09-25

## 2022-10-27 RX ORDER — DAPTOMYCIN 500 MG/10ML
1000 INJECTION, POWDER, LYOPHILIZED, FOR SOLUTION INTRAVENOUS EVERY 24 HOURS
Refills: 0 | Status: DISCONTINUED | OUTPATIENT
Start: 2022-10-27 | End: 2022-10-28

## 2022-10-27 RX ORDER — POLYETHYLENE GLYCOL 3350 17 G/17G
17 POWDER, FOR SOLUTION ORAL DAILY
Refills: 0 | Status: DISCONTINUED | OUTPATIENT
Start: 2022-10-27 | End: 2022-11-01

## 2022-10-27 RX ADMIN — POLYETHYLENE GLYCOL 3350 17 GRAM(S): 17 POWDER, FOR SOLUTION ORAL at 17:11

## 2022-10-27 RX ADMIN — Medication 50 MILLIGRAM(S): at 06:29

## 2022-10-27 RX ADMIN — MEROPENEM 100 MILLIGRAM(S): 1 INJECTION INTRAVENOUS at 22:09

## 2022-10-27 RX ADMIN — Medication 81 MILLIGRAM(S): at 17:10

## 2022-10-27 RX ADMIN — HEPARIN SODIUM 5000 UNIT(S): 5000 INJECTION INTRAVENOUS; SUBCUTANEOUS at 17:11

## 2022-10-27 RX ADMIN — Medication 100 MILLIGRAM(S): at 06:29

## 2022-10-27 RX ADMIN — INSULIN GLARGINE 12 UNIT(S): 100 INJECTION, SOLUTION SUBCUTANEOUS at 22:09

## 2022-10-27 RX ADMIN — Medication 100 MILLIGRAM(S): at 22:09

## 2022-10-27 RX ADMIN — ATORVASTATIN CALCIUM 40 MILLIGRAM(S): 80 TABLET, FILM COATED ORAL at 22:09

## 2022-10-27 RX ADMIN — DAPTOMYCIN 140 MILLIGRAM(S): 500 INJECTION, POWDER, LYOPHILIZED, FOR SOLUTION INTRAVENOUS at 17:09

## 2022-10-27 RX ADMIN — Medication 30 MILLIGRAM(S): at 17:10

## 2022-10-27 RX ADMIN — SEVELAMER CARBONATE 800 MILLIGRAM(S): 2400 POWDER, FOR SUSPENSION ORAL at 06:29

## 2022-10-27 RX ADMIN — Medication 325 MILLIGRAM(S): at 14:04

## 2022-10-27 RX ADMIN — SEVELAMER CARBONATE 800 MILLIGRAM(S): 2400 POWDER, FOR SUSPENSION ORAL at 22:09

## 2022-10-27 RX ADMIN — SENNA PLUS 1 TABLET(S): 8.6 TABLET ORAL at 17:10

## 2022-10-27 NOTE — PROGRESS NOTE ADULT - SUBJECTIVE AND OBJECTIVE BOX
SCHWABACHER, LAWRENCE  62y  [facility] [id number] B    Patient is a 62y old  Male who presents with a Sepsis (19 Oct 2022 14:02)    INTERVAL HPI/OVERNIGHT EVENTS:  seen and examined this morning and note written later on in the day   feels well  angio today       Vital Signs Last 24 Hrs  T(C): 36.1 (27 Oct 2022 15:18), Max: 36.7 (27 Oct 2022 08:17)  T(F): 97 (27 Oct 2022 15:18), Max: 98 (27 Oct 2022 08:17)  HR: 70 (27 Oct 2022 15:18) (66 - 83)  BP: 148/69 (27 Oct 2022 15:18) (117/58 - 158/74)  BP(mean): --  RR: 18 (27 Oct 2022 15:18) (16 - 18)  SpO2: 97% (27 Oct 2022 15:18) (94% - 98%)    Parameters below as of 27 Oct 2022 15:18  Patient On (Oxygen Delivery Method): room air      PHYSICAL EXAM:  GENERAL: NAD, well-groomed, well-developed  HEAD:  Atraumatic, Normocephalic  EYES: EOMI, PERRLA, conjunctiva and sclera clear  NERVOUS SYSTEM:  Alert & Oriented X 4, Good concentration; moves all extremities   CHEST/LUNG: Clear to percussion bilaterally; No rales, rhonchi, wheezing, or rubs  HEART: Regular rate and rhythm; No murmurs, rubs, or gallops  ABDOMEN: Soft, Nontender, Nondistended; Bowel sounds present  EXTREMITIES:  left foot dressing   SKIN: left food dressing    Consultant(s) Notes Reviewed:  [x ] YES  [ ] NO  Care Discussed with Consultants/Other Providers [ x] YES  [ ] NO    LABS:                         8.2    8.46  )-----------( 307      ( 26 Oct 2022 23:31 )             24.8       10-26    135  |  92<L>  |  33<H>  ----------------------------<  156<H>  4.7   |  30  |  4.3<HH>    Ca    8.4      26 Oct 2022 23:31  Mg     2.0     10-26    TPro  7.2  /  Alb  2.7<L>  /  TBili  0.3  /  DBili  x   /  AST  14  /  ALT  6   /  AlkPhos  173<H>  10-26        Culture - Abscess with Gram Stain (collected 16 Oct 2022 17:50)  Source: .Abscess left foot wound  Preliminary Report (18 Oct 2022 19:12):    Rare Morganella morganii    Rare Enterobacter cloacae complex    Rare Proteus vulgaris group    Rare Enterococcus faecium (vancomycin resistant)  Organism: Morganella morganii  Enterobacter cloacae complex  Proteus vulgaris group  Enterococcus faecium (vancomycin resistant) (18 Oct 2022 19:11)  Organism: Enterococcus faecium (vancomycin resistant) (18 Oct 2022 19:11)  Organism: Proteus vulgaris group (18 Oct 2022 19:05)  Organism: Enterobacter cloacae complex (18 Oct 2022 19:05)  Organism: Morganella morganii (18 Oct 2022 19:05)      MEDICATIONS  (STANDING):  aspirin enteric coated 81 milliGRAM(s) Oral daily  atorvastatin 40 milliGRAM(s) Oral at bedtime  Dakins Solution - 1/2 Strength 1 Application(s) Topical daily  DAPTOmycin IVPB 1000 milliGRAM(s) IV Intermittent every 24 hours  darbepoetin Injectable Syringe 40 MICROGram(s) IV Push <User Schedule>  heparin   Injectable 5000 Unit(s) SubCutaneous every 12 hours  influenza   Vaccine 0.5 milliLiter(s) IntraMuscular once  insulin glargine Injectable (LANTUS) 12 Unit(s) SubCutaneous at bedtime  insulin lispro (ADMELOG) corrective regimen sliding scale   SubCutaneous three times a day before meals  meropenem  IVPB 1000 milliGRAM(s) IV Intermittent every 24 hours  NIFEdipine XL 30 milliGRAM(s) Oral daily  phenazopyridine 100 milliGRAM(s) Oral three times a day  polyethylene glycol 3350 17 Gram(s) Oral daily  senna 1 Tablet(s) Oral two times a day  sevelamer carbonate 800 milliGRAM(s) Oral three times a day    MEDICATIONS  (PRN):  acetaminophen     Tablet .. 650 milliGRAM(s) Oral every 6 hours PRN Temp greater or equal to 38C (100.4F), Mild Pain (1 - 3)  aluminum hydroxide/magnesium hydroxide/simethicone Suspension 30 milliLiter(s) Oral every 4 hours PRN Dyspepsia  melatonin 3 milliGRAM(s) Oral at bedtime PRN Insomnia  ondansetron Injectable 4 milliGRAM(s) IV Push every 8 hours PRN Nausea and/or Vomiting

## 2022-10-27 NOTE — PROGRESS NOTE ADULT - SUBJECTIVE AND OBJECTIVE BOX
LAWRENCE SCHWABACHER 62y Male  MRN#: [id number]     Hospital Day: 13d    Pt is currently admitted with the primary diagnosis of  BACTEREMIA        HOSPITAL COURSE:     63 y/o with a PMHx of DM, HTN, ESRD (on dialysis M/W/F), CAD s/p 1 stent (2008) and 4x CABG (2012), PAD s/p bilateral angioplasty at [facility], was transferred from [facility] complaining of weakness and unhealed wound in the left foot.  Two months ago the patient inadvertently stepped on glass while cleaning his house injuring his left foot. His podiatrist removed glass fragments and sent him home on a week long course of antibiotics (patient unsure about which ABx). According to the patient the wound failed to improve, he progressively developed weakness on his legs, and recurring vomiting, which prompted him to go to [facility]'s ED 10/11/22. At [facility] patient was found to be septic and was started on IV Vancomycin 1000mg and Meropenem 500mg. Blood cultures grew ESBL E.Coli. An MRI of the foot showed "osteomyelitis of the calcaneus bone, with what appears to be a subacute fracture". Patient was evaluated by vascular surgery and podiatry at [facility], who recommended debriding the L foot wound. Patient requested to be transferred to [facility] where his vascular surgeons are. Pt underwent initial debridement 10/21,  second debridement 10/24/22. Continuing on meropenem. Added daptomycin for VRE coverage. Added fluconazole as patient is having significant dysuria. Patient is to have vascular procedure for occluded segment 10/27/22 with third DFU debridement to follow.     SUBJECTIVE     Overnight events  None    Subjective complaints  Pt was evaluated at bedside. Pt denied active complaints.                                             ----------------------------------------------------------  OBJECTIVE  PAST MEDICAL & SURGICAL HISTORY  CAD (coronary artery disease)  1 stent 2008    S/P CABG (coronary artery bypass graft)  x4    Diabetes mellitus    Transient ischemic attack (TIA)  2017; 2008    Chronic kidney disease (CKD)  Stage IV    Hypertension    Stented coronary artery  in 2008    Myocardial infarction  2012    PAD (peripheral artery disease)  S/p bypass left leg    HLD (hyperlipidemia)    BPH (benign prostatic hyperplasia)    Pain in left knee  s/p fall    OA (osteoarthritis)    [place] (hard of hearing)    Chronic anemia    S/P CABG (coronary artery bypass graft)  2012    H/O arterial bypass of lower limb  Left Lower Extremity (2016)    History of surgery  Left CEA (2017)  Left Pinkie toe Amputation (2014)  CABG x 4 (2012)  Card cath - stent (2008)      AV fistula  2019  LEFT AV FISTULA                                              -----------------------------------------------------------  ALLERGIES:  No Known Allergies                                            ------------------------------------------------------------    HOME MEDICATIONS  Home Medications:  aspirin 81 mg oral tablet: 1 tab(s) orally once a day (14 Sep 2022 17:42)  furosemide 40 mg oral tablet: 2 tab(s) orally once a day (14 Sep 2022 17:42)  Levemir 100 units/mL subcutaneous solution: 40 unit(s) subcutaneous once a day (at bedtime) (14 Sep 2022 17:42)  Metoprolol Tartrate 50 mg oral tablet: 1 tab(s) orally 2 times a day (14 Sep 2022 17:42)  Plavix 75 mg oral tablet: 1 tab(s) orally once a day (14 Sep 2022 17:42)  sevelamer carbonate 800 mg oral tablet: 1 tab(s) orally 3 times a day (with meals) (20 Sep 2022 11:36)  Trulicity Pen 1.5 mg/0.5 mL subcutaneous solution: 1.5  subcutaneous once a week (14 Sep 2022 17:42)                           MEDICATIONS:  STANDING MEDICATIONS  aspirin enteric coated 81 milliGRAM(s) Oral daily  atorvastatin 40 milliGRAM(s) Oral at bedtime  Dakins Solution - 1/2 Strength 1 Application(s) Topical daily  DAPTOmycin IVPB 1000 milliGRAM(s) IV Intermittent every 48 hours  darbepoetin Injectable Syringe 40 MICROGram(s) IV Push <User Schedule>  fluconAZOLE   Tablet 100 milliGRAM(s) Oral every 24 hours  glucagon  Injectable 1 milliGRAM(s) IntraMuscular once  heparin   Injectable 5000 Unit(s) SubCutaneous every 12 hours  influenza   Vaccine 0.5 milliLiter(s) IntraMuscular once  insulin glargine Injectable (LANTUS) 12 Unit(s) SubCutaneous at bedtime  insulin lispro (ADMELOG) corrective regimen sliding scale   SubCutaneous three times a day before meals  meropenem  IVPB 500 milliGRAM(s) IV Intermittent every 24 hours  metoprolol tartrate 50 milliGRAM(s) Oral two times a day  NIFEdipine XL 30 milliGRAM(s) Oral daily  phenazopyridine 100 milliGRAM(s) Oral three times a day  polyethylene glycol 3350 17 Gram(s) Oral daily  senna 1 Tablet(s) Oral two times a day  sevelamer carbonate 800 milliGRAM(s) Oral three times a day    PRN MEDICATIONS  acetaminophen     Tablet .. 650 milliGRAM(s) Oral every 6 hours PRN  aluminum hydroxide/magnesium hydroxide/simethicone Suspension 30 milliLiter(s) Oral every 4 hours PRN  melatonin 3 milliGRAM(s) Oral at bedtime PRN  ondansetron Injectable 4 milliGRAM(s) IV Push every 8 hours PRN                                            ------------------------------------------------------------  VITAL SIGNS: Last 24 Hours  T(C): 36.2 (27 Oct 2022 05:09), Max: 36.2 (27 Oct 2022 05:09)  T(F): 97.1 (27 Oct 2022 05:09), Max: 97.1 (27 Oct 2022 05:09)  HR: 74 (27 Oct 2022 05:09) (71 - 88)  BP: 117/58 (27 Oct 2022 05:09) (117/58 - 164/74)  BP(mean): --  RR: 18 (27 Oct 2022 05:09) (18 - 18)  SpO2: --      10-25-22 @ 07:01  -  10-26-22 @ 07:00  --------------------------------------------------------  IN: 420 mL / OUT: 1200 mL / NET: -780 mL    10-26-22 @ 07:01  -  10-27-22 @ 05:57  --------------------------------------------------------  IN: 240 mL / OUT: 2400 mL / NET: -2160 mL                                             --------------------------------------------------------------  LABS:                        8.2    8.46  )-----------( 307      ( 26 Oct 2022 23:31 )             24.8     10-26    135  |  92<L>  |  33<H>  ----------------------------<  156<H>  4.7   |  30  |  4.3<HH>    Ca    8.4      26 Oct 2022 23:31  Phos  3.7     10-25  Mg     2.0     10-26    TPro  7.2  /  Alb  2.7<L>  /  TBili  0.3  /  DBili  x   /  AST  14  /  ALT  6   /  AlkPhos  173<H>  10-26    PT/INR - ( 26 Oct 2022 23:31 )   PT: 11.90 sec;   INR: 1.04 ratio         PTT - ( 26 Oct 2022 23:31 )  PTT:30.8 sec      Creatine Kinase, Serum: 15 U/L (10-26-22 @ 06:53)        Culture - Urine (collected 25 Oct 2022 20:46)  Source: Clean Catch Clean Catch (Midstream)  Final Report (27 Oct 2022 00:19):    No growth        CARDIAC MARKERS ( 26 Oct 2022 06:53 )  x     / x     / 15 U/L / x     / x                                                  -------------------------------------------------------------  RADIOLOGY:                                            --------------------------------------------------------------    PHYSICAL EXAM:  GENERAL: NAD, lying in bed comfortably  HEENT:  Atraumatic, Normocephalic  NECK: Supple, trachea midline  CHEST/LUNG: BL BS CTA. Unlabored respirations  HEART: S1S2 audible; RRR,  ABDOMEN: Soft, Nontender, Nondistended.    EXTREMITIES: Warm, LLE swelling  NERVOUS SYSTEM:  Awake and alert.   SKIN: left foot swollen and wrapped                                            --------------------------------------------------------------                 LAWRENCE SCHWABACHER 62y Male  MRN#: [id number]     Hospital Day: 13d    Pt is currently admitted with the primary diagnosis of  BACTEREMIA        HOSPITAL COURSE:     63 y/o with a PMHx of DM, HTN, ESRD (on dialysis M/W/F), CAD s/p 1 stent (2008) and 4x CABG (2012), PAD s/p bilateral angioplasty at [facility], was transferred from [facility] complaining of weakness and unhealed wound in the left foot.  Two months ago the patient inadvertently stepped on glass while cleaning his house injuring his left foot. His podiatrist removed glass fragments and sent him home on a week long course of antibiotics (patient unsure about which ABx). According to the patient the wound failed to improve, he progressively developed weakness on his legs, and recurring vomiting, which prompted him to go to [facility]'s ED 10/11/22. At [facility] patient was found to be septic and was started on IV Vancomycin 1000mg and Meropenem 500mg. Blood cultures grew ESBL E.Coli. An MRI of the foot showed "osteomyelitis of the calcaneus bone, with what appears to be a subacute fracture". Patient was evaluated by vascular surgery and podiatry at [facility], who recommended debriding the L foot wound. Patient requested to be transferred to [facility] where his vascular surgeons are. Pt underwent initial debridement 10/21,  second debridement 10/24/22. Continuing on meropenem. Added daptomycin for VRE coverage. Added fluconazole as patient is having significant dysuria. Patient is to have vascular procedure for occluded segment 10/27/22 with third DFU debridement to follow.     SUBJECTIVE     Overnight events  None    Subjective complaints  Pt was evaluated at bedside. Pt denied active complaints, though does endorse return of dysuria.                                            ----------------------------------------------------------  OBJECTIVE  PAST MEDICAL & SURGICAL HISTORY  CAD (coronary artery disease)  1 stent 2008    S/P CABG (coronary artery bypass graft)  x4    Diabetes mellitus    Transient ischemic attack (TIA)  2017; 2008    Chronic kidney disease (CKD)  Stage IV    Hypertension    Stented coronary artery  in 2008    Myocardial infarction  2012    PAD (peripheral artery disease)  S/p bypass left leg    HLD (hyperlipidemia)    BPH (benign prostatic hyperplasia)    Pain in left knee  s/p fall    OA (osteoarthritis)    [place] (hard of hearing)    Chronic anemia    S/P CABG (coronary artery bypass graft)  2012    H/O arterial bypass of lower limb  Left Lower Extremity (2016)    History of surgery  Left CEA (2017)  Left Pinkie toe Amputation (2014)  CABG x 4 (2012)  Card cath - stent (2008)      AV fistula  2019  LEFT AV FISTULA                                              -----------------------------------------------------------  ALLERGIES:  No Known Allergies                                            ------------------------------------------------------------    HOME MEDICATIONS  Home Medications:  aspirin 81 mg oral tablet: 1 tab(s) orally once a day (14 Sep 2022 17:42)  furosemide 40 mg oral tablet: 2 tab(s) orally once a day (14 Sep 2022 17:42)  Levemir 100 units/mL subcutaneous solution: 40 unit(s) subcutaneous once a day (at bedtime) (14 Sep 2022 17:42)  Metoprolol Tartrate 50 mg oral tablet: 1 tab(s) orally 2 times a day (14 Sep 2022 17:42)  Plavix 75 mg oral tablet: 1 tab(s) orally once a day (14 Sep 2022 17:42)  sevelamer carbonate 800 mg oral tablet: 1 tab(s) orally 3 times a day (with meals) (20 Sep 2022 11:36)  Trulicity Pen 1.5 mg/0.5 mL subcutaneous solution: 1.5  subcutaneous once a week (14 Sep 2022 17:42)                           MEDICATIONS:  STANDING MEDICATIONS  aspirin enteric coated 81 milliGRAM(s) Oral daily  atorvastatin 40 milliGRAM(s) Oral at bedtime  Dakins Solution - 1/2 Strength 1 Application(s) Topical daily  DAPTOmycin IVPB 1000 milliGRAM(s) IV Intermittent every 48 hours  darbepoetin Injectable Syringe 40 MICROGram(s) IV Push <User Schedule>  fluconAZOLE   Tablet 100 milliGRAM(s) Oral every 24 hours  glucagon  Injectable 1 milliGRAM(s) IntraMuscular once  heparin   Injectable 5000 Unit(s) SubCutaneous every 12 hours  influenza   Vaccine 0.5 milliLiter(s) IntraMuscular once  insulin glargine Injectable (LANTUS) 12 Unit(s) SubCutaneous at bedtime  insulin lispro (ADMELOG) corrective regimen sliding scale   SubCutaneous three times a day before meals  meropenem  IVPB 500 milliGRAM(s) IV Intermittent every 24 hours  metoprolol tartrate 50 milliGRAM(s) Oral two times a day  NIFEdipine XL 30 milliGRAM(s) Oral daily  phenazopyridine 100 milliGRAM(s) Oral three times a day  polyethylene glycol 3350 17 Gram(s) Oral daily  senna 1 Tablet(s) Oral two times a day  sevelamer carbonate 800 milliGRAM(s) Oral three times a day    PRN MEDICATIONS  acetaminophen     Tablet .. 650 milliGRAM(s) Oral every 6 hours PRN  aluminum hydroxide/magnesium hydroxide/simethicone Suspension 30 milliLiter(s) Oral every 4 hours PRN  melatonin 3 milliGRAM(s) Oral at bedtime PRN  ondansetron Injectable 4 milliGRAM(s) IV Push every 8 hours PRN                                            ------------------------------------------------------------  VITAL SIGNS: Last 24 Hours  T(C): 36.2 (27 Oct 2022 05:09), Max: 36.2 (27 Oct 2022 05:09)  T(F): 97.1 (27 Oct 2022 05:09), Max: 97.1 (27 Oct 2022 05:09)  HR: 74 (27 Oct 2022 05:09) (71 - 88)  BP: 117/58 (27 Oct 2022 05:09) (117/58 - 164/74)  BP(mean): --  RR: 18 (27 Oct 2022 05:09) (18 - 18)  SpO2: --      10-25-22 @ 07:01  -  10-26-22 @ 07:00  --------------------------------------------------------  IN: 420 mL / OUT: 1200 mL / NET: -780 mL    10-26-22 @ 07:01  -  10-27-22 @ 05:57  --------------------------------------------------------  IN: 240 mL / OUT: 2400 mL / NET: -2160 mL                                             --------------------------------------------------------------  LABS:                        8.2    8.46  )-----------( 307      ( 26 Oct 2022 23:31 )             24.8     10-26    135  |  92<L>  |  33<H>  ----------------------------<  156<H>  4.7   |  30  |  4.3<HH>    Ca    8.4      26 Oct 2022 23:31  Phos  3.7     10-25  Mg     2.0     10-26    TPro  7.2  /  Alb  2.7<L>  /  TBili  0.3  /  DBili  x   /  AST  14  /  ALT  6   /  AlkPhos  173<H>  10-26    PT/INR - ( 26 Oct 2022 23:31 )   PT: 11.90 sec;   INR: 1.04 ratio         PTT - ( 26 Oct 2022 23:31 )  PTT:30.8 sec      Creatine Kinase, Serum: 15 U/L (10-26-22 @ 06:53)        Culture - Urine (collected 25 Oct 2022 20:46)  Source: Clean Catch Clean Catch (Midstream)  Final Report (27 Oct 2022 00:19):    No growth        CARDIAC MARKERS ( 26 Oct 2022 06:53 )  x     / x     / 15 U/L / x     / x                                                  -------------------------------------------------------------  RADIOLOGY:                                            --------------------------------------------------------------    PHYSICAL EXAM:  GENERAL: NAD, lying in bed comfortably  HEENT:  Atraumatic, Normocephalic  NECK: Supple, trachea midline  CHEST/LUNG: BL BS CTA. Unlabored respirations  HEART: S1S2 audible; RRR,  ABDOMEN: Soft, Nontender, Nondistended.    EXTREMITIES: Warm, LLE swelling  NERVOUS SYSTEM:  Awake and alert.   SKIN: left foot swollen and wrapped                                            --------------------------------------------------------------

## 2022-10-27 NOTE — PROGRESS NOTE ADULT - ASSESSMENT
61 y/o with a PMHx of DM, HTN, ESRD (on dialysis M/W/F, last session on 10/14), CAD s/p 1 stent on 2008 and quadruple CABG on 2012, PAD s/p bilateral angioplasty at [facility], was transferred from [facility] complaining of weakness and unhealed wound in the left foot.    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   b) No microvascular outflow demonstrates within the calcaneous. No hemodynamically significant stenosis within the left SFA and popliteal artery. No intervention performed  - Vascular planning on repeat angio with endovascular revascularization via tibial stick today with Dr. Malik  -Left Calcaneous OM s/p removal of FB 9/16  -Wound Cx 9/16 - Enterobacter cloacae. treated with two weeks post-HD vancomycin + cefepime  -wound CX 10/16 VRE Faecim, Enterobacter cloacae complex, Proteus vulgaris, Morganella morganii s/p debridement 10/21 Wound Cx Proteus mirabilis, VRE faecium, Pseudomonas putida and s/p debridement 10/24  -Podiatry planning on surgical debridement with wound VAC application early next week after vascular procedure  -NPO after midnight    #ESRD on HD  - HD as per nephrology     #CAD s/p stent and CABG   - on asa and statin   - holding plavix since 10/16  - continue BB  - Cardio consult requested by vascular - cardio aware     #DM II  -fs well controlled on current regimen     #Anemia of chronic disease due to ESKD s/p 1 PRBC preop   - transfused 1 PRBC    #Dysuria with pyuria  - Fluconazole  - follow up urine culture    Progress Note Handoff  Pending Consults: podiatry follow up  Pending Tests: labs  Pending Results: labs  Family Discussion: discussed labs, meds, vascular procedure, further podiatry debridements and overall plan of care with pt and medical staff. Remains acute for further debridement with podiatry   Disposition: Home_____/SNF______/Other_____/Unknown at this time___x__  Spent over 45 min reviewing chart and on coordinating patient care during interdisciplinary rounds

## 2022-10-27 NOTE — PROGRESS NOTE ADULT - ASSESSMENT
61 y/o with a PMHx of DM, HTN, ESRD (on dialysis M/W/F, last session on 10/14), CAD s/p 1 stent on 2008 and quadruple CABG on 2012, PAD s/p bilateral angioplasty at [facility], was transferred from [facility] complaining of weakness and unhealed wound in the left foot.    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 hx of PAD   - IR for angiogram 10/20/22: a) Left lower extremity angiogram demonstrating chronically occluded PT and AT with patent anterior tibial vein bypass reconstituting into the DP. b) No microvascular outflow demonstrates within the calcaneous. No hemodynamically significant stenosis within the left SFA and popliteal artery. No intervention performed  - Blood cx 10/15:NTD   - Wound Cx 9/16 - Enterobacter cloacae  - treated with two weeks post-HD vancomycin + cefepime  - wound CX 10/16 VRE Faecim, Enterobacter cloacae complex, Proteus vulgaris, Morganella morganii   - s/p debridement 10/21 Wound Cx Proteus mirabilis, VRE faecium, Pseudomonas putida  - MRI L foot 10/17: a) Osteomyelitis and erosion of the calcaneus with a vertical pathologic fracture extending to the subtalar joint. Gangrene of overlying subcutaneous tissues. 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  - ID consult appreciated - will likely need at least 6 weeks from last debridement for calcaneal OM  - Cont meropenem 500mg IV daily   - C/w daptomycin 12 mg/kg q 48 hours to cover VRE faecium   - Creatine Kinase, Serum: 15 U/L (10.26.22 @ 06:53) -- check CK weekly 2/ daptomycin use (next check 11/2)  - Podiatry consult:  a) Local Wound Care; Wound Flushed w/ NS; Wound Packed w/ xeroform/ ABD x2 / DSD / Kerlix / Ace bandage  b) Weight Bearing Status; WBAT w/ Heel Touch w/ Surgical Shoe;   c) Continue w/ Local Wound Care; Q24 Dressing Changes;  - S/p Initial debridement 10/21  - S/p 10/24/22 excisional debridement of soft tissue and bone of left foot; (pt given 1u pRBCs pre-op)  - Planned for another attempt left lower extremity angiogram with endovascular revascularization via tibial stick 2/ occluded segment found on previous angiogram (discussed with vascular team 10/25) - scheduled Thursday 10/27 with Dr. Malik  - CBC, CMP, Mg, Coags, T&S ordered. COVID 10/26 (-)  - Per vascular: PLEASE OBTAIN VEIN MAPPING AND CARDIOLOGY CLEARANCE FOR POSSIBLE BYPASS ON THURSDAY (Cardio on board)   - Cardiac clearance for debridement obtained 10/19 for debridement pre-op determined patient to be at moderate cardiac risk.   - Plan for 3rd surgical debridement with wound VAC application after vascular procedure    #Dysuria - r/o UTI  - Pt already receiving meropenem - should cover most UTI causing bacteria  - 10/25 Kidney & Bladder sonogram (-) for hydronephrosis   - C/w fluconazole (10/25-present) with HD renal dosing in case of fungal origin   - EKG 10/25: . Will Check daily EKG to ensure fluconazole not worsening prolongation   - UA 10/23 WBC 13, nitrite negative, LEC small, bacteria negative   - F/u urine cx (specimen received)    # ESRD on HD  - HD as per nephrology   - C/w darbepoetin     # CAD s/p stent and CABG   - on statin   - Discuss with cardio. we're unable to hold aspirin. Cont aspirin   - holding plavix since 10/16  - continue BB  - Cardio consult 10/19 appreciated at moderate risk for surgery     # DM II  - Follow FS  - Insulin regimen     # Constipation   - Cont miralax daily, senna  - will consider adding milk of magnesium if continued     #Misc  - Diet: DASH  - GI: PPI  - DVT: Heparin subq  - Dispo: Medicine 63 y/o with a PMHx of DM, HTN, ESRD (on dialysis M/W/F, last session on 10/14), CAD s/p 1 stent on 2008 and quadruple CABG on 2012, PAD s/p bilateral angioplasty at [facility], was transferred from [facility] complaining of weakness and unhealed wound in the left foot.    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 hx of PAD   - IR for angiogram 10/20/22: a) Left lower extremity angiogram demonstrating chronically occluded PT and AT with patent anterior tibial vein bypass reconstituting into the DP. b) No microvascular outflow demonstrates within the calcaneous. No hemodynamically significant stenosis within the left SFA and popliteal artery. No intervention performed  - Blood cx 10/15:NTD   - Wound Cx 9/16 - Enterobacter cloacae  - treated with two weeks post-HD vancomycin + cefepime  - wound CX 10/16 VRE Faecim, Enterobacter cloacae complex, Proteus vulgaris, Morganella morganii   - s/p debridement 10/21 Wound Cx Proteus mirabilis, VRE faecium, Pseudomonas putida  - MRI L foot 10/17: a) Osteomyelitis and erosion of the calcaneus with a vertical pathologic fracture extending to the subtalar joint. Gangrene of overlying subcutaneous tissues. 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  - ID consult appreciated - will likely need at least 6 weeks from last debridement for calcaneal OM  - Cont meropenem 500mg IV daily   - C/w daptomycin 12 mg/kg q 48 hours to cover VRE faecium   - Creatine Kinase, Serum: 15 U/L (10.26.22 @ 06:53) -- check CK weekly 2/ daptomycin use (next check 11/2)  - Podiatry consult:  a) Local Wound Care; Wound Flushed w/ NS; Wound Packed w/ xeroform/ ABD x2 / DSD / Kerlix / Ace bandage  b) Weight Bearing Status; WBAT w/ Heel Touch w/ Surgical Shoe;   c) Continue w/ Local Wound Care; Q24 Dressing Changes;  - S/p Initial debridement 10/21  - S/p 10/24/22 excisional debridement of soft tissue and bone of left foot; (pt given 1u pRBCs pre-op)  - S/p 10/27 left lower extremity angiogram, peroneal artery angioplasty and atherectomy 2/ occluded segment found on previous angiogram with Dr. Malik  - Cardio consult 10/26 appreciated patient at high risk for surgery  - Plan for 3rd surgical debridement with wound VAC application after vascular procedure    #Dysuria - r/o UTI  - Pt already receiving meropenem - should cover most UTI causing bacteria  - UA 10/23 WBC 13, nitrite negative, LEC small, bacteria negative   - 10/25 urine cx (-)  - 10/25 Kidney & Bladder sonogram (-) for hydronephrosis   - C/w fluconazole (10/25-present) with HD renal dosing in case of fungal origin   - EKG 10/25: . Will Check daily EKG to ensure fluconazole not worsening prolongation   - C/w pyridium TID 10/27-10/30    #HTN  - Added nifedipine 30  - C/w metoprolol     # ESRD on HD  - HD as per nephrology   - C/w darbepoetin     # CAD s/p stent and CABG   - on statin   - Discuss with cardio. we're unable to hold aspirin. Cont aspirin   - holding plavix since 10/16  - continue BB  - Cardio consult 10/19 appreciated at moderate risk for surgery     # DM II  - Follow FS  - Insulin regimen     # Constipation   - Cont miralax daily, senna  - will consider adding milk of magnesium if continued     #Misc  - Diet: DASH  - GI: PPI  - DVT: Heparin subq  - Dispo: Medicine

## 2022-10-28 LAB
ALBUMIN SERPL ELPH-MCNC: 2.9 G/DL — LOW (ref 3.5–5.2)
ALP SERPL-CCNC: 157 U/L — HIGH (ref 30–115)
ALT FLD-CCNC: <5 U/L — SIGNIFICANT CHANGE UP (ref 0–41)
ANION GAP SERPL CALC-SCNC: 17 MMOL/L — HIGH (ref 7–14)
AST SERPL-CCNC: 11 U/L — SIGNIFICANT CHANGE UP (ref 0–41)
BASOPHILS # BLD AUTO: 0.11 K/UL — SIGNIFICANT CHANGE UP (ref 0–0.2)
BASOPHILS NFR BLD AUTO: 1.2 % — HIGH (ref 0–1)
BILIRUB SERPL-MCNC: 0.3 MG/DL — SIGNIFICANT CHANGE UP (ref 0.2–1.2)
BUN SERPL-MCNC: 51 MG/DL — HIGH (ref 10–20)
CALCIUM SERPL-MCNC: 8.5 MG/DL — SIGNIFICANT CHANGE UP (ref 8.4–10.5)
CHLORIDE SERPL-SCNC: 95 MMOL/L — LOW (ref 98–110)
CO2 SERPL-SCNC: 24 MMOL/L — SIGNIFICANT CHANGE UP (ref 17–32)
CREAT SERPL-MCNC: 5.8 MG/DL — CRITICAL HIGH (ref 0.7–1.5)
EGFR: 10 ML/MIN/1.73M2 — LOW
EOSINOPHIL # BLD AUTO: 0.17 K/UL — SIGNIFICANT CHANGE UP (ref 0–0.7)
EOSINOPHIL NFR BLD AUTO: 1.8 % — SIGNIFICANT CHANGE UP (ref 0–8)
GLUCOSE BLDC GLUCOMTR-MCNC: 126 MG/DL — HIGH (ref 70–99)
GLUCOSE BLDC GLUCOMTR-MCNC: 405 MG/DL — HIGH (ref 70–99)
GLUCOSE SERPL-MCNC: 108 MG/DL — HIGH (ref 70–99)
HCT VFR BLD CALC: 26.1 % — LOW (ref 42–52)
HGB BLD-MCNC: 8.7 G/DL — LOW (ref 14–18)
IMM GRANULOCYTES NFR BLD AUTO: 0.6 % — HIGH (ref 0.1–0.3)
LYMPHOCYTES # BLD AUTO: 1.11 K/UL — LOW (ref 1.2–3.4)
LYMPHOCYTES # BLD AUTO: 11.7 % — LOW (ref 20.5–51.1)
MAGNESIUM SERPL-MCNC: 2.1 MG/DL — SIGNIFICANT CHANGE UP (ref 1.8–2.4)
MCHC RBC-ENTMCNC: 32.1 PG — HIGH (ref 27–31)
MCHC RBC-ENTMCNC: 33.3 G/DL — SIGNIFICANT CHANGE UP (ref 32–37)
MCV RBC AUTO: 96.3 FL — HIGH (ref 80–94)
MONOCYTES # BLD AUTO: 0.63 K/UL — HIGH (ref 0.1–0.6)
MONOCYTES NFR BLD AUTO: 6.7 % — SIGNIFICANT CHANGE UP (ref 1.7–9.3)
NEUTROPHILS # BLD AUTO: 7.39 K/UL — HIGH (ref 1.4–6.5)
NEUTROPHILS NFR BLD AUTO: 78 % — HIGH (ref 42.2–75.2)
NRBC # BLD: 0 /100 WBCS — SIGNIFICANT CHANGE UP (ref 0–0)
PLATELET # BLD AUTO: 296 K/UL — SIGNIFICANT CHANGE UP (ref 130–400)
POTASSIUM SERPL-MCNC: 4.9 MMOL/L — SIGNIFICANT CHANGE UP (ref 3.5–5)
POTASSIUM SERPL-SCNC: 4.9 MMOL/L — SIGNIFICANT CHANGE UP (ref 3.5–5)
PROT SERPL-MCNC: 7.3 G/DL — SIGNIFICANT CHANGE UP (ref 6–8)
RBC # BLD: 2.71 M/UL — LOW (ref 4.7–6.1)
RBC # FLD: 14.6 % — HIGH (ref 11.5–14.5)
SODIUM SERPL-SCNC: 136 MMOL/L — SIGNIFICANT CHANGE UP (ref 135–146)
WBC # BLD: 9.47 K/UL — SIGNIFICANT CHANGE UP (ref 4.8–10.8)
WBC # FLD AUTO: 9.47 K/UL — SIGNIFICANT CHANGE UP (ref 4.8–10.8)

## 2022-10-28 PROCEDURE — 93010 ELECTROCARDIOGRAM REPORT: CPT

## 2022-10-28 PROCEDURE — 99233 SBSQ HOSP IP/OBS HIGH 50: CPT

## 2022-10-28 RX ORDER — FLUCONAZOLE 150 MG/1
100 TABLET ORAL EVERY 24 HOURS
Refills: 0 | Status: DISCONTINUED | OUTPATIENT
Start: 2022-10-28 | End: 2022-10-28

## 2022-10-28 RX ORDER — DAPTOMYCIN 500 MG/10ML
950 INJECTION, POWDER, LYOPHILIZED, FOR SOLUTION INTRAVENOUS
Refills: 0 | Status: ACTIVE | OUTPATIENT
Start: 2022-10-30 | End: 2022-11-05

## 2022-10-28 RX ORDER — TAMSULOSIN HYDROCHLORIDE 0.4 MG/1
0.4 CAPSULE ORAL AT BEDTIME
Refills: 0 | Status: ACTIVE | OUTPATIENT
Start: 2022-10-28 | End: 2023-09-26

## 2022-10-28 RX ADMIN — HEPARIN SODIUM 5000 UNIT(S): 5000 INJECTION INTRAVENOUS; SUBCUTANEOUS at 05:55

## 2022-10-28 RX ADMIN — HEPARIN SODIUM 5000 UNIT(S): 5000 INJECTION INTRAVENOUS; SUBCUTANEOUS at 17:04

## 2022-10-28 RX ADMIN — MEROPENEM 100 MILLIGRAM(S): 1 INJECTION INTRAVENOUS at 21:36

## 2022-10-28 RX ADMIN — Medication 40 MICROGRAM(S): at 09:53

## 2022-10-28 RX ADMIN — TAMSULOSIN HYDROCHLORIDE 0.4 MILLIGRAM(S): 0.4 CAPSULE ORAL at 21:36

## 2022-10-28 RX ADMIN — INSULIN GLARGINE 12 UNIT(S): 100 INJECTION, SOLUTION SUBCUTANEOUS at 21:37

## 2022-10-28 RX ADMIN — SEVELAMER CARBONATE 800 MILLIGRAM(S): 2400 POWDER, FOR SUSPENSION ORAL at 05:56

## 2022-10-28 RX ADMIN — SENNA PLUS 1 TABLET(S): 8.6 TABLET ORAL at 17:03

## 2022-10-28 RX ADMIN — Medication 100 MILLIGRAM(S): at 05:55

## 2022-10-28 RX ADMIN — SEVELAMER CARBONATE 800 MILLIGRAM(S): 2400 POWDER, FOR SUSPENSION ORAL at 21:36

## 2022-10-28 RX ADMIN — DAPTOMYCIN 140 MILLIGRAM(S): 500 INJECTION, POWDER, LYOPHILIZED, FOR SOLUTION INTRAVENOUS at 17:04

## 2022-10-28 RX ADMIN — Medication 81 MILLIGRAM(S): at 12:48

## 2022-10-28 RX ADMIN — SEVELAMER CARBONATE 800 MILLIGRAM(S): 2400 POWDER, FOR SUSPENSION ORAL at 14:19

## 2022-10-28 RX ADMIN — FLUCONAZOLE 100 MILLIGRAM(S): 150 TABLET ORAL at 12:48

## 2022-10-28 RX ADMIN — ATORVASTATIN CALCIUM 40 MILLIGRAM(S): 80 TABLET, FILM COATED ORAL at 21:36

## 2022-10-28 RX ADMIN — Medication 1 APPLICATION(S): at 12:49

## 2022-10-28 RX ADMIN — Medication 12: at 16:58

## 2022-10-28 RX ADMIN — Medication 30 MILLIGRAM(S): at 05:56

## 2022-10-28 NOTE — PROGRESS NOTE ADULT - ASSESSMENT
61 y/o with a PMHx of DM, HTN, ESRD (on dialysis M/W/F, last session on 10/14), CAD s/p 1 stent on 2008 and quadruple CABG on 2012, PAD s/p bilateral angioplasty at [facility], was transferred from [facility] complaining of weakness and unhealed wound in the left foot.    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 hx of PAD   - IR for angiogram 10/20/22: a) Left lower extremity angiogram demonstrating chronically occluded PT and AT with patent anterior tibial vein bypass reconstituting into the DP. b) No microvascular outflow demonstrates within the calcaneous. No hemodynamically significant stenosis within the left SFA and popliteal artery. No intervention performed  - Blood cx 10/15:NTD   - Wound Cx 9/16 - Enterobacter cloacae  - treated with two weeks post-HD vancomycin + cefepime  - wound CX 10/16 VRE Faecim, Enterobacter cloacae complex, Proteus vulgaris, Morganella morganii   - s/p debridement 10/21 Wound Cx Proteus mirabilis, VRE faecium, Pseudomonas putida  - MRI L foot 10/17: a) Osteomyelitis and erosion of the calcaneus with a vertical pathologic fracture extending to the subtalar joint. Gangrene of overlying subcutaneous tissues. 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  - ID consult appreciated - will likely need at least 6 weeks from last debridement for calcaneal OM  - Cont meropenem 500mg IV daily   - C/w daptomycin 12 mg/kg q 48 hours to cover VRE faecium   - Creatine Kinase, Serum: 15 U/L (10.26.22 @ 06:53) -- check CK weekly 2/ daptomycin use (next check 11/2)  - Podiatry consult:  a) Local Wound Care; Wound Flushed w/ NS; Wound Packed w/ xeroform/ ABD x2 / DSD / Kerlix / Ace bandage  b) Weight Bearing Status; WBAT w/ Heel Touch w/ Surgical Shoe;   c) Continue w/ Local Wound Care; Q24 Dressing Changes;  - S/p Initial debridement 10/21  - S/p 10/24/22 excisional debridement of soft tissue and bone of left foot; (pt given 1u pRBCs pre-op)  - S/p 10/27 left lower extremity angiogram, peroneal artery angioplasty and atherectomy 2/ occluded segment found on previous angiogram with Dr. Malik  - Cardio consult 10/26 appreciated patient at high risk for surgery  - Plan for 3rd surgical debridement with wound VAC application after vascular procedure    #Dysuria - r/o UTI  - Pt already receiving meropenem - should cover most UTI causing bacteria  - UA 10/23 WBC 13, nitrite negative, LEC small, bacteria negative   - 10/25 urine cx (-)  - 10/25 Kidney & Bladder sonogram (-) for hydronephrosis   - C/w fluconazole (10/25-present) with HD renal dosing in case of fungal origin   - EKG 10/25: . --> 10/27 QTC worsening. Fluconazole stopped.   - C/w pyridium TID 10/27-10/30    #HTN  - Added nifedipine 30  - C/w metoprolol     # ESRD on HD  - HD as per nephrology   - C/w darbepoetin     # CAD s/p stent and CABG   - on statin   - Discuss with cardio. we're unable to hold aspirin. Cont aspirin   - holding plavix since 10/16  - continue BB  - Cardio consult 10/19 appreciated at moderate risk for surgery     # DM II  - Follow FS  - Insulin regimen     # Constipation   - Cont miralax daily, senna  - will consider adding milk of magnesium if continued     #Misc  - Diet: DASH  - GI: PPI  - DVT: Heparin subq  - Dispo: Medicine   63 y/o with a PMHx of DM, HTN, ESRD (on dialysis M/W/F, last session on 10/14), CAD s/p 1 stent on 2008 and quadruple CABG on 2012, PAD s/p bilateral angioplasty at [facility], was transferred from [facility] complaining of weakness and unhealed wound in the left foot.    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 hx of PAD   - IR for angiogram 10/20/22: a) Left lower extremity angiogram demonstrating chronically occluded PT and AT with patent anterior tibial vein bypass reconstituting into the DP. b) No microvascular outflow demonstrates within the calcaneous. No hemodynamically significant stenosis within the left SFA and popliteal artery. No intervention performed  - Blood cx 10/15:NTD   - Wound Cx 9/16 - Enterobacter cloacae  - treated with two weeks post-HD vancomycin + cefepime  - wound CX 10/16 VRE Faecim, Enterobacter cloacae complex, Proteus vulgaris, Morganella morganii   - s/p debridement 10/21 Wound Cx Proteus mirabilis, VRE faecium, Pseudomonas putida  - MRI L foot 10/17: a) Osteomyelitis and erosion of the calcaneus with a vertical pathologic fracture extending to the subtalar joint. Gangrene of overlying subcutaneous tissues. 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  - ID consult appreciated - will likely need at least 6 weeks from last debridement for calcaneal OM  - Cont meropenem 500mg IV daily   - C/w daptomycin 12 mg/kg q 48 hours to cover VRE faecium   - Creatine Kinase, Serum: 15 U/L (10.26.22 @ 06:53) -- check CK weekly 2/ daptomycin use (next check 11/2)  - Podiatry consult:  a) Local Wound Care; Wound Flushed w/ NS; Wound Packed w/ xeroform/ ABD x2 / DSD / Kerlix / Ace bandage  b) Weight Bearing Status; WBAT w/ Heel Touch w/ Surgical Shoe;   c) Continue w/ Local Wound Care; Q24 Dressing Changes;  - S/p Initial debridement 10/21, S/p 10/24/22 excisional debridement of soft tissue and bone of left foot; (pt given 1u pRBCs pre-op)  - S/p 10/27 left lower extremity angiogram, peroneal artery angioplasty and atherectomy 2/ occluded segment found on previous angiogram with Dr. Malik  - Cardio consult 10/26 appreciated patient at high risk for surgery  - Podiatry Plan for 3rd surgical debridement with wound VAC application after vascular procedure - scheduled for 10/31/22    #Dysuria - r/o UTI  - Pt already receiving meropenem - should cover most UTI causing bacteria  - UA 10/23 WBC 13, nitrite negative, LEC small, bacteria negative   - 10/25 urine cx (-)  - 10/25 Kidney & Bladder sonogram (-) for hydronephrosis   - C/w fluconazole (10/25-10/30) with HD renal dosing in case of fungal origin 5 d. course per ID  - EKG 10/25: . --> 10/27 .   - Per nephro d/c phenazopyridine in HD patient  - Started flomax 0.4 daily   - Repeat bladder scan    #HTN  - C/w nifedipine 30, metoprolol     # ESRD on HD  - HD as per nephrology   - C/w darbepoetin     # CAD s/p stent and CABG   - on statin   - Discuss with cardio. we're unable to hold aspirin. Cont aspirin   - holding plavix since 10/16  - continue BB  - Cardio consult 10/19 appreciated at moderate risk for surgery     # DM II  - Follow FS  - Insulin regimen     # Constipation - resolved   - Cont miralax daily, senna    #Misc  - Diet: DASH  - GI: PPI  - DVT: Heparin subq  - Dispo: Medicine

## 2022-10-28 NOTE — PROGRESS NOTE ADULT - SUBJECTIVE AND OBJECTIVE BOX
SCHWABACHER, LAWRENCE  62y, Male  Allergy: No Known Allergies      LOS  14d    CHIEF COMPLAINT: Sepsis (28 Oct 2022 08:15)      INTERVAL EVENTS/HPI  - No acute events overnight  - T(F): , Max: 98 (10-27-22 @ 12:55)  - Denies any worsening symptoms  - Tolerating medication  - WBC Count: 9.47 (10-28-22 @ 07:18)  WBC Count: 8.46 (10-26-22 @ 23:31)     - Creatinine, Serum: 4.3 (10-26-22 @ 23:31)       ROS  General: Denies rigors, nightsweats  HEENT: Denies headache, rhinorrhea, sore throat, eye pain  CV: Denies CP, palpitations  PULM: Denies wheezing, hemoptysis  GI: Denies hematemesis, hematochezia, melena  : Denies discharge, hematuria  MSK: Denies arthralgias, myalgias  SKIN: Denies rash, lesions  NEURO: Denies paresthesias, weakness  PSYCH: Denies depression, anxiety    VITALS:  T(F): 97.3, Max: 98 (10-27-22 @ 12:55)  HR: 83  BP: 124/66  RR: 18Vital Signs Last 24 Hrs  T(C): 36.3 (28 Oct 2022 04:55), Max: 36.7 (27 Oct 2022 12:55)  T(F): 97.3 (28 Oct 2022 04:55), Max: 98 (27 Oct 2022 12:55)  HR: 83 (28 Oct 2022 08:00) (66 - 97)  BP: 124/66 (28 Oct 2022 08:00) (124/66 - 149/72)  BP(mean): --  RR: 18 (28 Oct 2022 08:00) (16 - 18)  SpO2: 97% (27 Oct 2022 21:00) (94% - 98%)    Parameters below as of 28 Oct 2022 08:00  Patient On (Oxygen Delivery Method): room air        PHYSICAL EXAM:  Gen: NAD, resting in bed  HEENT: Normocephalic, atraumatic  Neck: supple, no lymphadenopathy  CV: Regular rate & regular rhythm  Lungs: decreased BS at bases, no fremitus  Abdomen: Soft, BS present  Ext: Warm, well perfused  Neuro: non focal, awake  Skin: no rash, no erythema  Lines: no phlebitis    FH: Non-contributory  Social Hx: Non-contributory    TESTS & MEASUREMENTS:                        8.7    9.47  )-----------( 296      ( 28 Oct 2022 07:18 )             26.1     10-28    136  |  95<L>  |  51<H>  ----------------------------<  108<H>  4.9   |  24  |  x     Ca    8.5      28 Oct 2022 07:18  Mg     2.1     10-28    TPro  7.3  /  Alb  2.9<L>  /  TBili  0.3  /  DBili  x   /  AST  11  /  ALT  x   /  AlkPhos  157<H>  10-28      LIVER FUNCTIONS - ( 28 Oct 2022 07:18 )  Alb: 2.9 g/dL / Pro: 7.3 g/dL / ALK PHOS: 157 U/L / ALT: x     / AST: 11 U/L / GGT: x               Culture - Urine (collected 10-25-22 @ 20:46)  Source: Clean Catch Clean Catch (Midstream)  Final Report (10-27-22 @ 00:19):    No growth    Culture - Tissue with Gram Stain (collected 10-21-22 @ 14:05)  Source: .Tissue None  Gram Stain (10-22-22 @ 01:55):    No polymorphonuclear leukocytes seen per low power field    No organisms seen per oil power field  Final Report (10-26-22 @ 15:47):    Moderate Proteus mirabilis    Rare Pseudomonas putida group    Rare Enterococcus faecium (vancomycin resistant)    Rare Staphylococcus haemolyticus  Organism: Proteus mirabilis  Pseudomonas putida group  Enterococcus faecium (vancomycin resistant)  Staphylococcus haemolyticus (10-26-22 @ 15:47)  Organism: Staphylococcus haemolyticus (10-26-22 @ 15:47)      -  Ampicillin/Sulbactam: R 16/8      -  Cefazolin: R >16      -  Clindamycin: R >4      -  Erythromycin: R >4      -  Gentamicin: R >8 Should not be used as monotherapy      -  Oxacillin: R >2      -  Penicillin: R >8      -  Rifampin: R >2 Should not be used as monotherapy      -  Tetra/Doxy: S <=1      -  Trimethoprim/Sulfamethoxazole: R >2/38      -  Vancomycin: S 4      Method Type: CONNER  Organism: Enterococcus faecium (vancomycin resistant) (10-26-22 @ 15:47)      -  Ampicillin: R >8 Predicts results to ampicillin/sulbactam, amoxacillin-clavulanate and  piperacillin-tazobactam.      -  Daptomycin: SDD 4 The breakpoint for SDD (sensitive dose dependent)is based on a dosage regimen of 8-12 mg/kg administered every 24 h in adults and is intended for serious infections due to E. faecium. Consultation with an infectious diseases specialist is recommended.      -  Levofloxacin: R >4      -  Linezolid: S 2      -  Tetra/Doxy: R >8      -  Vancomycin: R >16      Method Type: CONNER  Organism: Pseudomonas putida group (10-26-22 @ 15:47)      -  Amikacin: S <=16      -  Aztreonam: S <=4      -  Cefepime: S <=2      -  Ceftriaxone: S <=1      -  Ciprofloxacin: S <=0.25      -  Gentamicin: S <=2      -  Levofloxacin: S <=0.5      -  Meropenem: S <=1      -  Piperacillin/Tazobactam: S <=8      -  Tobramycin: S <=2      -  Trimethoprim/Sulfamethoxazole: S <=0.5/9.5      Method Type: CONNER  Organism: Proteus mirabilis (10-26-22 @ 15:47)      -  Amikacin: S <=16      -  Amoxicillin/Clavulanic Acid: S <=8/4      -  Ampicillin: S <=8 These ampicillin results predict results for amoxicillin      -  Ampicillin/Sulbactam: S <=4/2 Enterobacter, Klebsiella aerogenes, Citrobacter, and Serratia may develop resistance during prolonged therapy (3-4 days)      -  Aztreonam: S <=4      -  Cefazolin: S <=2 Enterobacter, Klebsiella aerogenes, Citrobacter, and Serratia may develop resistance during prolonged therapy (3-4 days)      -  Cefepime: S <=2      -  Cefoxitin: S <=8      -  Ceftriaxone: S <=1 Enterobacter, Klebsiella aerogenes, Citrobacter, and Serratia may develop resistance during prolonged therapy      -  Ciprofloxacin: S <=0.25      -  Ertapenem: S <=0.5      -  Gentamicin: S <=2      -  Levofloxacin: S <=0.5      -  Meropenem: S <=1      -  Piperacillin/Tazobactam: S <=8      -  Tobramycin: S <=2      -  Trimethoprim/Sulfamethoxazole: S <=0.5/9.5      Method Type: CONNER    Culture - Surgical Swab (collected 10-21-22 @ 14:04)  Source: .Surgical Swab None  Final Report (10-26-22 @ 16:01):    Rare Proteus vulgaris group    Few Enterococcus faecium (vancomycin resistant)    Rare Pseudomonas putida group    Rare Coag Negative Staphylococcus "Susceptibilities not performed"  Organism: Proteus vulgaris group  Enterococcus faecium (vancomycin resistant)  Pseudomonas putida group (10-26-22 @ 16:01)  Organism: Pseudomonas putida group (10-26-22 @ 16:01)      -  Amikacin: S <=16      -  Aztreonam: S 8      -  Cefepime: S <=2      -  Ceftriaxone: S 8      -  Ciprofloxacin: S <=0.25      -  Gentamicin: S <=2      -  Levofloxacin: S <=0.5      -  Meropenem: S <=1      -  Piperacillin/Tazobactam: S <=8      -  Tobramycin: S <=2      -  Trimethoprim/Sulfamethoxazole: R >2/38      Method Type: CONNER  Organism: Enterococcus faecium (vancomycin resistant) (10-26-22 @ 16:01)      -  Ampicillin: R >8 Predicts results to ampicillin/sulbactam, amoxacillin-clavulanate and  piperacillin-tazobactam.      -  Daptomycin: SDD 4 The breakpoint for SDD (sensitive dose dependent)is based on a dosage regimen of 8-12 mg/kg administered every 24 h in adults and is intended for serious infections due to E. faecium. Consultation with an infectious diseases specialist is recommended.      -  Levofloxacin: R >4      -  Linezolid: S 2      -  Tetra/Doxy: R >8      -  Vancomycin: R >16      Method Type: CONNER  Organism: Proteus vulgaris group (10-26-22 @ 16:01)      -  Amikacin: S <=16      -  Amoxicillin/Clavulanic Acid: S <=8/4      -  Ampicillin: R <=8 These ampicillin results predict results for amoxicillin      -  Ampicillin/Sulbactam: S <=4/2 Enterobacter, Klebsiella aerogenes, Citrobacter, and Serratia may develop resistance during prolonged therapy (3-4 days)      -  Aztreonam: S <=4      -  Cefazolin: R <=2 Enterobacter, Klebsiella aerogenes, Citrobacter, and Serratia may develop resistance during prolonged therapy (3-4 days)      -  Cefepime: S <=2      -  Cefoxitin: S <=8      -  Ceftriaxone: S <=1 Enterobacter, Klebsiella aerogenes, Citrobacter, and Serratia may develop resistance during prolonged therapy      -  Ciprofloxacin: S <=0.25      -  Ertapenem: S <=0.5      -  Gentamicin: S <=2      -  Levofloxacin: S <=0.5      -  Meropenem: S <=1      -  Piperacillin/Tazobactam: S <=8      -  Tobramycin: S <=2      -  Trimethoprim/Sulfamethoxazole: S <=0.5/9.5      Method Type: CONNER    Culture - Abscess with Gram Stain (collected 10-16-22 @ 17:50)  Source: .Abscess left foot wound  Final Report (10-21-22 @ 18:00):    Rare Morganella morganii    Rare Enterobacter cloacae complex    Rare Proteus vulgaris group    Rare Enterococcus faecium (vancomycin resistant)  Organism: Morganella morganii  Enterobacter cloacae complex  Proteus vulgaris group  Enterococcus faecium (vancomycin resistant) (10-21-22 @ 18:00)  Organism: Enterococcus faecium (vancomycin resistant) (10-21-22 @ 18:00)      -  Ampicillin: R >8 Predicts results to ampicillin/sulbactam, amoxacillin-clavulanate and  piperacillin-tazobactam.      -  Daptomycin: SDD 4 The breakpoint for SDD (sensitive dose dependent)is based on a dosage regimen of 8-12 mg/kg administered every 24 h in adults and is intended for serious infections due to E. faecium. Consultation with an infectious diseases specialist is recommended.      -  Levofloxacin: R >4      -  Linezolid: S 1      -  Tetra/Doxy: R >8      -  Vancomycin: R >16      Method Type: CONNER  Organism: Proteus vulgaris group (10-21-22 @ 18:00)      -  Amikacin: S <=16      -  Amoxicillin/Clavulanic Acid: S <=8/4      -  Ampicillin: R >16 These ampicillin results predict results for amoxicillin      -  Ampicillin/Sulbactam: S <=4/2 Enterobacter, Klebsiella aerogenes, Citrobacter, and Serratia may develop resistance during prolonged therapy (3-4 days)      -  Aztreonam: S <=4      -  Cefazolin: R >16 Enterobacter, Klebsiella aerogenes, Citrobacter, and Serratia may develop resistance during prolonged therapy (3-4 days)      -  Cefepime: S <=2      -  Cefoxitin: S <=8      -  Ceftriaxone: S <=1 Enterobacter, Klebsiella aerogenes, Citrobacter, and Serratia may develop resistance during prolonged therapy      -  Ciprofloxacin: S <=0.25      -  Ertapenem: S <=0.5      -  Gentamicin: S <=2      -  Levofloxacin: S <=0.5      -  Meropenem: S <=1      -  Piperacillin/Tazobactam: S <=8      -  Tobramycin: S <=2      -  Trimethoprim/Sulfamethoxazole: S <=0.5/9.5      Method Type: CONNER  Organism: Enterobacter cloacae complex (10-21-22 @ 18:00)      -  Amikacin: S <=16      -  Amoxicillin/Clavulanic Acid: R 16/8      -  Ampicillin: R >16 These ampicillin results predict results for amoxicillin      -  Ampicillin/Sulbactam: R <=4/2 Enterobacter, Klebsiella aerogenes, Citrobacter, and Serratia may develop resistance during prolonged therapy (3-4 days)      -  Aztreonam: S 8      -  Cefazolin: R >16 Enterobacter, Klebsiella aerogenes, Citrobacter, and Serratia may develop resistance during prolonged therapy (3-4 days)      -  Cefepime: S <=2      -  Cefoxitin: R 16      -  Ceftriaxone: R >32 Enterobacter, Klebsiella aerogenes, Citrobacter, and Serratia may develop resistance during prolonged therapy      -  Ciprofloxacin: S <=0.25      -  Ertapenem: S <=0.5      -  Gentamicin: S <=2      -  Imipenem: S <=1      -  Levofloxacin: S <=0.5      -  Meropenem: S <=1      -  Piperacillin/Tazobactam: S <=8      -  Tobramycin: S <=2      -  Trimethoprim/Sulfamethoxazole: S 1/19      Method Type: CONNER  Organism: Morganella morganii (10-21-22 @ 18:00)      -  Amikacin: S <=16      -  Amoxicillin/Clavulanic Acid: R >16/8      -  Ampicillin: R >16 These ampicillin results predict results for amoxicillin      -  Ampicillin/Sulbactam: I 16/8 Enterobacter, Klebsiella aerogenes, Citrobacter, and Serratia may develop resistance during prolonged therapy (3-4 days)      -  Aztreonam: S <=4      -  Cefazolin: R >16 Enterobacter, Klebsiella aerogenes, Citrobacter, and Serratia may develop resistance during prolonged therapy (3-4 days)      -  Cefepime: S <=2      -  Cefoxitin: S <=8      -  Ceftriaxone: S <=1 Enterobacter, Klebsiella aerogenes, Citrobacter, and Serratia may develop resistance during prolonged therapy      -  Ciprofloxacin: S <=0.25      -  Ertapenem: S <=0.5      -  Gentamicin: S <=2      -  Imipenem: S <=1      -  Levofloxacin: S <=0.5      -  Meropenem: S <=1      -  Piperacillin/Tazobactam: S <=8      -  Tobramycin: S <=2      -  Trimethoprim/Sulfamethoxazole: S <=0.5/9.5      Method Type: CONNER    Culture - Blood (collected 10-15-22 @ 12:07)  Source: .Blood None  Final Report (10-20-22 @ 21:00):    No Growth Final            INFECTIOUS DISEASES TESTING  COVID-19 PCR: NotDetec (10-26-22 @ 13:06)  COVID-19 PCR: NotDetec (10-17-22 @ 20:19)  COVID-19 PCR: Detected (09-20-22 @ 12:50)  COVID-19 PCR: NotDetec (09-14-22 @ 13:45)  COVID-19 PCR: NotDetec (09-06-22 @ 01:35)  COVID-19 PCR: NotDetec (03-02-22 @ 14:15)  COVID-19 PCR: NotDetec (02-27-22 @ 15:30)  COVID-19 PCR: NotDetec (02-23-22 @ 11:50)  COVID-19 PCR: NotDetec (02-21-22 @ 10:25)      INFLAMMATORY MARKERS  C-Reactive Protein, Serum: 114.2 mg/L (10-16-22 @ 12:26)  Sedimentation Rate, Erythrocyte: 140 mm/Hr (10-16-22 @ 12:26)  Sedimentation Rate, Erythrocyte: 142 mm/Hr (10-15-22 @ 09:53)  C-Reactive Protein, Serum: 141.8 mg/L (10-15-22 @ 09:53)      RADIOLOGY & ADDITIONAL TESTS:  I have personally reviewed the last available Chest xray  CXR      CT      CARDIOLOGY TESTING  12 Lead ECG:   Ventricular Rate 74 BPM    Atrial Rate 74 BPM    P-R Interval 174 ms    QRS Duration 100 ms    Q-T Interval 454 ms    QTC Calculation(Bazett) 503 ms    P Axis 29 degrees    R Axis 138 degrees    T Axis 87 degrees    Diagnosis Line Normal sinus rhythm  Right axis deviation  Nonspecific T wave abnormality  Prolonged QT  Abnormal ECG    Confirmed by INGRIS JACOME MD ([id number]) on 10/28/2022 7:10:27 AM (10-27-22 @ 17:38)  12 Lead ECG:   Ventricular Rate 89 BPM    Atrial Rate 89 BPM    P-R Interval 194 ms    QRS Duration 96 ms    Q-T Interval 416 ms    QTC Calculation(Bazett) 506 ms    P Axis 37 degrees    R Axis 120 degrees    T Axis 62 degrees    Diagnosis Line Normal sinus rhythm  Right axis deviation  Possible Anterior infarct , age undetermined  Abnormal ECG    Confirmed by NEAL BENAVIDEZ MD ([id number]) on 10/26/2022 12:58:21 PM (10-25-22 @ 22:25)      MEDICATIONS  aspirin enteric coated 81 Oral daily  atorvastatin 40 Oral at bedtime  Dakins Solution - 1/2 Strength 1 Topical daily  DAPTOmycin IVPB 1000 IV Intermittent every 24 hours  darbepoetin Injectable Syringe 40 IV Push <User Schedule>  fluconAZOLE   Tablet 100 Oral every 24 hours  heparin   Injectable 5000 SubCutaneous every 12 hours  influenza   Vaccine 0.5 IntraMuscular once  insulin glargine Injectable (LANTUS) 12 SubCutaneous at bedtime  insulin lispro (ADMELOG) corrective regimen sliding scale  SubCutaneous three times a day before meals  meropenem  IVPB 1000 IV Intermittent every 24 hours  NIFEdipine XL 30 Oral daily  phenazopyridine 100 Oral three times a day  polyethylene glycol 3350 17 Oral daily  senna 1 Oral two times a day  sevelamer carbonate 800 Oral three times a day      WEIGHT  Weight (kg): 90.1 (10-27-22 @ 10:30)      ANTIBIOTICS:  DAPTOmycin IVPB 1000 milliGRAM(s) IV Intermittent every 24 hours  fluconAZOLE   Tablet 100 milliGRAM(s) Oral every 24 hours  meropenem  IVPB 1000 milliGRAM(s) IV Intermittent every 24 hours      All available historical records have been reviewed

## 2022-10-28 NOTE — PROGRESS NOTE ADULT - SUBJECTIVE AND OBJECTIVE BOX
LAWRENCE SCHWABACHER 62y Male  MRN#: [id number]     Hospital Day: 14d    Pt is currently admitted with the primary diagnosis of  BACTEREMIA        HOSPITAL COURSE:     61 y/o with a PMHx of DM, HTN, ESRD (on dialysis M/W/F), CAD s/p 1 stent (2008) and 4x CABG (2012), PAD s/p bilateral angioplasty at [facility], was transferred from [facility] complaining of weakness and unhealed wound in the left foot.  Two months ago the patient inadvertently stepped on glass while cleaning his house injuring his left foot. His podiatrist removed glass fragments and sent him home on a week long course of antibiotics (patient unsure about which ABx). According to the patient the wound failed to improve, he progressively developed weakness on his legs, and recurring vomiting, which prompted him to go to [facility]'s ED 10/11/22. At [facility] patient was found to be septic and was started on IV Vancomycin 1000mg and Meropenem 500mg. Blood cultures grew ESBL E.Coli. An MRI of the foot showed "osteomyelitis of the calcaneus bone, with what appears to be a subacute fracture". Patient was evaluated by vascular surgery and podiatry at [facility], who recommended debriding the L foot wound. Patient requested to be transferred to [facility] where his vascular surgeons are. Pt underwent initial debridement 10/21,  second debridement 10/24/22. Continuing on meropenem. Added daptomycin for VRE coverage. Added fluconazole as patient is having significant dysuria. Patient is to have vascular procedure for occluded segment 10/27/22 with third DFU debridement to follow.     SUBJECTIVE     Overnight events  None    Subjective complaints  Pt was evaluated at bedside. Pt denied active complaints.                                             ----------------------------------------------------------  OBJECTIVE  PAST MEDICAL & SURGICAL HISTORY  CAD (coronary artery disease)  1 stent 2008    S/P CABG (coronary artery bypass graft)  x4    Diabetes mellitus    Transient ischemic attack (TIA)  2017; 2008    Chronic kidney disease (CKD)  Stage IV    Hypertension    Stented coronary artery  in 2008    Myocardial infarction  2012    PAD (peripheral artery disease)  S/p bypass left leg    HLD (hyperlipidemia)    BPH (benign prostatic hyperplasia)    Pain in left knee  s/p fall    OA (osteoarthritis)    [place] (hard of hearing)    Chronic anemia    S/P CABG (coronary artery bypass graft)  2012    H/O arterial bypass of lower limb  Left Lower Extremity (2016)    History of surgery  Left CEA (2017)  Left Pinkie toe Amputation (2014)  CABG x 4 (2012)  Card cath - stent (2008)      AV fistula  2019  LEFT AV FISTULA                                              -----------------------------------------------------------  ALLERGIES:  No Known Allergies                                            ------------------------------------------------------------    HOME MEDICATIONS  Home Medications:  aspirin 81 mg oral tablet: 1 tab(s) orally once a day (14 Sep 2022 17:42)  furosemide 40 mg oral tablet: 2 tab(s) orally once a day (14 Sep 2022 17:42)  Levemir 100 units/mL subcutaneous solution: 40 unit(s) subcutaneous once a day (at bedtime) (14 Sep 2022 17:42)  Metoprolol Tartrate 50 mg oral tablet: 1 tab(s) orally 2 times a day (14 Sep 2022 17:42)  Plavix 75 mg oral tablet: 1 tab(s) orally once a day (14 Sep 2022 17:42)  sevelamer carbonate 800 mg oral tablet: 1 tab(s) orally 3 times a day (with meals) (20 Sep 2022 11:36)  Trulicity Pen 1.5 mg/0.5 mL subcutaneous solution: 1.5  subcutaneous once a week (14 Sep 2022 17:42)                           MEDICATIONS:  STANDING MEDICATIONS  aspirin enteric coated 81 milliGRAM(s) Oral daily  atorvastatin 40 milliGRAM(s) Oral at bedtime  Dakins Solution - 1/2 Strength 1 Application(s) Topical daily  DAPTOmycin IVPB 1000 milliGRAM(s) IV Intermittent every 24 hours  darbepoetin Injectable Syringe 40 MICROGram(s) IV Push <User Schedule>  heparin   Injectable 5000 Unit(s) SubCutaneous every 12 hours  influenza   Vaccine 0.5 milliLiter(s) IntraMuscular once  insulin glargine Injectable (LANTUS) 12 Unit(s) SubCutaneous at bedtime  insulin lispro (ADMELOG) corrective regimen sliding scale   SubCutaneous three times a day before meals  meropenem  IVPB 1000 milliGRAM(s) IV Intermittent every 24 hours  NIFEdipine XL 30 milliGRAM(s) Oral daily  phenazopyridine 100 milliGRAM(s) Oral three times a day  polyethylene glycol 3350 17 Gram(s) Oral daily  senna 1 Tablet(s) Oral two times a day  sevelamer carbonate 800 milliGRAM(s) Oral three times a day    PRN MEDICATIONS  acetaminophen     Tablet .. 650 milliGRAM(s) Oral every 6 hours PRN  aluminum hydroxide/magnesium hydroxide/simethicone Suspension 30 milliLiter(s) Oral every 4 hours PRN  melatonin 3 milliGRAM(s) Oral at bedtime PRN  ondansetron Injectable 4 milliGRAM(s) IV Push every 8 hours PRN                                            ------------------------------------------------------------  VITAL SIGNS: Last 24 Hours  T(C): 36.3 (28 Oct 2022 04:55), Max: 36.7 (27 Oct 2022 08:17)  T(F): 97.3 (28 Oct 2022 04:55), Max: 98 (27 Oct 2022 08:17)  HR: 97 (28 Oct 2022 04:55) (66 - 97)  BP: 146/67 (28 Oct 2022 04:55) (128/66 - 149/72)  BP(mean): --  RR: 18 (28 Oct 2022 04:55) (16 - 18)  SpO2: 97% (27 Oct 2022 21:00) (94% - 98%)      10-26-22 @ 07:01  -  10-27-22 @ 07:00  --------------------------------------------------------  IN: 240 mL / OUT: 2400 mL / NET: -2160 mL    10-27-22 @ 07:01  -  10-28-22 @ 06:22  --------------------------------------------------------  IN: 460 mL / OUT: 0 mL / NET: 460 mL                                             --------------------------------------------------------------  LABS:                        8.2    8.46  )-----------( 307      ( 26 Oct 2022 23:31 )             24.8     10-26    135  |  92<L>  |  33<H>  ----------------------------<  156<H>  4.7   |  30  |  4.3<HH>    Ca    8.4      26 Oct 2022 23:31  Mg     2.0     10-26    TPro  7.2  /  Alb  2.7<L>  /  TBili  0.3  /  DBili  x   /  AST  14  /  ALT  6   /  AlkPhos  173<H>  10-26    PT/INR - ( 26 Oct 2022 23:31 )   PT: 11.90 sec;   INR: 1.04 ratio         PTT - ( 26 Oct 2022 23:31 )  PTT:30.8 sec            Culture - Urine (collected 25 Oct 2022 20:46)  Source: Clean Catch Clean Catch (Midstream)  Final Report (27 Oct 2022 00:19):    No growth        CARDIAC MARKERS ( 26 Oct 2022 06:53 )  x     / x     / 15 U/L / x     / x                                                  -------------------------------------------------------------  RADIOLOGY:                                            --------------------------------------------------------------    PHYSICAL EXAM:  GENERAL: NAD, lying in bed comfortably  HEENT:  Atraumatic, Normocephalic  NECK: Supple, trachea midline  CHEST/LUNG: BL BS CTA. Unlabored respirations  HEART: S1S2 audible; RRR,  ABDOMEN: Soft, Nontender, Nondistended.    EXTREMITIES: Warm, LLE swelling  NERVOUS SYSTEM:  Awake and alert.   SKIN: left foot swollen and wrapped                                            --------------------------------------------------------------

## 2022-10-28 NOTE — PROGRESS NOTE ADULT - ASSESSMENT
63 y/o with a PMHx of DM, HTN, ESRD (on dialysis M/W/F, last session on 10/14), CAD s/p 1 stent on 2008 and quadruple CABG on 2012, PAD s/p bilateral angioplasty at [facility], was transferred from [facility] complaining of weakness and unhealed wound in the left foot.    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   b) No microvascular outflow demonstrates within the calcaneous. No hemodynamically significant stenosis within the left SFA and popliteal artery. No intervention performed  - Vascular planning on repeat angio with endovascular revascularization via tibial stick today with Dr. Malik  -Left Calcaneous OM s/p removal of FB 9/16  -Wound Cx 9/16 - Enterobacter cloacae. treated with two weeks post-HD vancomycin + cefepime  -wound CX 10/16 VRE Faecim, Enterobacter cloacae complex, Proteus vulgaris, Morganella morganii s/p debridement 10/21 Wound Cx Proteus mirabilis, VRE faecium, Pseudomonas putida and s/p debridement 10/24  -Podiatry planning on surgical debridement with wound VAC application on Monday    #ESRD on HD  - HD as per nephrology     #CAD s/p stent and CABG   - on asa and statin   - holding plavix since 10/16  - continue BB  - Cardio consult requested by vascular - cardio aware     #DM II  -fs well controlled on current regimen     #Anemia of chronic disease due to ESKD s/p 1 PRBC preop   - transfused 1 PRBC    #Dysuria with pyuria  - Fluconazole  - follow up urine culture  - started pyridium     Progress Note Handoff  Pending Consults: podiatry follow up  Pending Tests: labs, COVID ON SUNDAY  Pending Results: labs  Family Discussion: discussed labs, meds, further podiatry debridements and overall plan of care with pt and medical staff. Remains acute for further debridement with podiatry on Monday  Disposition: Home_____/SNF______/Other_____/Unknown at this time___x__  Spent over 35 min reviewing chart and on coordinating patient care during interdisciplinary rounds

## 2022-10-28 NOTE — PROGRESS NOTE ADULT - SUBJECTIVE AND OBJECTIVE BOX
Vascular Surgery Post-Op Note,  SPECTRA [id number]    CC: non-healing left foot ulcer  Pre-Op Dx:   - BACTEREMIA  - Acute osteomyelitis      Procedure: Excisional debridement of ulcer of left foot  Angiogram, infrapopliteal  Angioplasty, artery, peroneal, open or percutaneous approach  Atherectomy, artery, peroneal    Subjective: Patient pain controlled, dressing C/D/I. Dopplerable DP/PT pulses on LLE.     Vital Signs Last 24 Hrs  T(C): 36.1 (27 Oct 2022 21:00), Max: 36.7 (27 Oct 2022 08:17)  T(F): 96.9 (27 Oct 2022 21:00), Max: 98 (27 Oct 2022 08:17)  HR: 78 (27 Oct 2022 21:00) (66 - 78)  BP: 130/63 (27 Oct 2022 21:00) (117/58 - 149/72)  BP(mean): --  RR: 18 (27 Oct 2022 21:00) (16 - 18)  SpO2: 97% (27 Oct 2022 21:00) (94% - 98%)    Parameters below as of 27 Oct 2022 21:00  Patient On (Oxygen Delivery Method): room air    Physical Exam:  General: NAD, resting comfortably in bed  Pulmonary: Nonlabored breathing, no respiratory distress  Cardiovascular: S1, S2 present   Abdominal: soft, NT/ND  Skin: dressing c/d/i  Pulses: dopplerable left PT/DP    LABS:                        8.2    8.46  )-----------( 307      ( 26 Oct 2022 23:31 )             24.8     10-26    135  |  92<L>  |  33<H>  ----------------------------<  156<H>  4.7   |  30  |  4.3<HH>    Ca    8.4      26 Oct 2022 23:31  Mg     2.0     10-26    TPro  7.2  /  Alb  2.7<L>  /  TBili  0.3  /  DBili  x   /  AST  14  /  ALT  6   /  AlkPhos  173<H>  10-26    PT/INR - ( 26 Oct 2022 23:31 )   PT: 11.90 sec;   INR: 1.04 ratio         PTT - ( 26 Oct 2022 23:31 )  PTT:30.8 sec  CAPILLARY BLOOD GLUCOSE      POCT Blood Glucose.: 197 mg/dL (27 Oct 2022 21:07)  POCT Blood Glucose.: 121 mg/dL (27 Oct 2022 16:29)  POCT Blood Glucose.: 94 mg/dL (27 Oct 2022 08:10)    LIVER FUNCTIONS - ( 26 Oct 2022 23:31 )  Alb: 2.7 g/dL / Pro: 7.2 g/dL / ALK PHOS: 173 U/L / ALT: 6 U/L / AST: 14 U/L / GGT: x

## 2022-10-28 NOTE — CHART NOTE - NSCHARTNOTEFT_GEN_A_CORE
Patient is scheduled for surgery excisional debridement of soft tissue/bone left foot with wound VAC application on Mon 10/31. Please make pt NPO MN 10/30. Please optimize lab values prior to OR.     Podiatry   [phone] Patient is scheduled for surgery excisional debridement of soft tissue/bone left foot with wound VAC application on Mon 10/31. Please make pt NPO MN 10/30. Please optimize lab values prior to OR.     Spoke to patient bedside, he is aware of surgery date and amenable to plan at this time.     Podiatry   [phone]

## 2022-10-28 NOTE — PROGRESS NOTE ADULT - ASSESSMENT
ASSESSMENT  61 y/o with a PMHx of DM, HTN, ESRD (on dialysis M/W/F, last session on 10/14), CAD s/p 1 stent on 2008 and quadruple CABG on 2012, PAD s/p bilateral angioplasty at [facility], was transferred from [facility] complaining of weakness and unhealed wound in the left foot, found to have OM of calcaneus bone and ESBL E coli bacteremia.    IMPRESSION  #Left Calcaneous OM  - s/p removal of FB 9/16  - Wound Cx 9/16 - Enterobacter cloacae  - treated with two weeks post-HD vancomycin + cefepime  - wound CX 10/16 VRE Faecim, Enterobacter cloacae complex, Proteus vulgaris, Morganella morganii   - s/p debridement 10/21 Wound Cx Proteus mirabilis, VRE faecium, Pseudomonas putida  - s/p debridement 10/24    #Dysuria   - Urine Cx 10/25 NG -- collected after fluconazole     #ESBL E coli Bacteremia at [facility]     #ESRD on HD  #CAD s/p CABG  #PAD s/p bilateral angioplasty  #DM  #Abx allergy:      RECOMMENDATIONS  - continue meropenem 500 mg q 24 hours   - continue daptomycin 12 mg/kg q 48 hours to cover VRE faecium  -- Creatine Kinase, Serum: 15 U/L (10.26.22 @ 06:53) -- check CK weekly  - on fluconazole 100 mg daily for possible Candida UTI -- plan for 5 day course (end date 10/30) but check EKG daily   - planned for debridement 10/31   - will likely need at least 6 weeks from last debridement for  calcaneal OM       Please call or message on [employer] Teams if with any questions.  Spectra [phone]

## 2022-10-28 NOTE — PROGRESS NOTE ADULT - ASSESSMENT
Assessment: 62y Male with non-healing left foot ulcer s/p LLE angiogram with peroneal angioplasty and atherectomy    Plan:  - Care as per primary team  - Routine pulse checks  - Pain control  - Monitor dressing  - Continue IV abx    [phone] Assessment: 62y Male with non-healing left foot ulcer s/p LLE angiogram with peroneal angioplasty and atherectomy    Plan:  - Care as per primary team  - Routine pulse checks  - Pain control  - Monitor dressing  - Continue IV abx  Follow up as outpt with Dr. Malik in 2 weeks [phone]    [phone]

## 2022-10-28 NOTE — PROGRESS NOTE ADULT - SUBJECTIVE AND OBJECTIVE BOX
SCHWABACHER, LAWRENCE  62y  [facility] F3-4B 014 B    Patient is a 62y old  Male who presents with a Sepsis (19 Oct 2022 14:02)    INTERVAL HPI/OVERNIGHT EVENTS:  seen and examined this afternoon  feels well  s/p HD today       Vital Signs Last 24 Hrs  T(C): 35.9 (28 Oct 2022 13:00), Max: 36.3 (28 Oct 2022 04:55)  T(F): 96.7 (28 Oct 2022 13:00), Max: 97.3 (28 Oct 2022 04:55)  HR: 102 (28 Oct 2022 13:00) (78 - 102)  BP: 125/59 (28 Oct 2022 13:00) (124/66 - 146/67)  BP(mean): --  RR: 18 (28 Oct 2022 13:00) (18 - 18)  SpO2: 97% (28 Oct 2022 13:00) (97% - 97%)    Parameters below as of 28 Oct 2022 13:00  Patient On (Oxygen Delivery Method): room air      PHYSICAL EXAM:  GENERAL: NAD, well-groomed, well-developed  HEAD:  Atraumatic, Normocephalic  EYES: EOMI, PERRLA, conjunctiva and sclera clear  NERVOUS SYSTEM:  Alert & Oriented X 4, Good concentration; moves all extremities   CHEST/LUNG: Clear to percussion bilaterally; No rales, rhonchi, wheezing, or rubs  HEART: Regular rate and rhythm; No murmurs, rubs, or gallops  ABDOMEN: Soft, Nontender, Nondistended; Bowel sounds present  EXTREMITIES:  left foot dressing   SKIN: left food dressing    Consultant(s) Notes Reviewed:  [x ] YES  [ ] NO  Care Discussed with Consultants/Other Providers [ x] YES  [ ] NO    LABS:                                    8.7    9.47  )-----------( 296      ( 28 Oct 2022 07:18 )             26.1     10-28    136  |  95<L>  |  51<H>  ----------------------------<  108<H>  4.9   |  24  |  5.8<HH>    Ca    8.5      28 Oct 2022 07:18  Mg     2.1     10-28    TPro  7.3  /  Alb  2.9<L>  /  TBili  0.3  /  DBili  x   /  AST  11  /  ALT  <5  /  AlkPhos  157<H>  10-28      MEDICATIONS  (STANDING):  aspirin enteric coated 81 milliGRAM(s) Oral daily  atorvastatin 40 milliGRAM(s) Oral at bedtime  Dakins Solution - 1/2 Strength 1 Application(s) Topical daily  DAPTOmycin IVPB 1000 milliGRAM(s) IV Intermittent every 24 hours  darbepoetin Injectable Syringe 40 MICROGram(s) IV Push <User Schedule>  fluconAZOLE   Tablet 100 milliGRAM(s) Oral every 24 hours  heparin   Injectable 5000 Unit(s) SubCutaneous every 12 hours  influenza   Vaccine 0.5 milliLiter(s) IntraMuscular once  insulin glargine Injectable (LANTUS) 12 Unit(s) SubCutaneous at bedtime  insulin lispro (ADMELOG) corrective regimen sliding scale   SubCutaneous three times a day before meals  meropenem  IVPB 1000 milliGRAM(s) IV Intermittent every 24 hours  NIFEdipine XL 30 milliGRAM(s) Oral daily  polyethylene glycol 3350 17 Gram(s) Oral daily  senna 1 Tablet(s) Oral two times a day  sevelamer carbonate 800 milliGRAM(s) Oral three times a day  tamsulosin 0.4 milliGRAM(s) Oral at bedtime    MEDICATIONS  (PRN):  acetaminophen     Tablet .. 650 milliGRAM(s) Oral every 6 hours PRN Temp greater or equal to 38C (100.4F), Mild Pain (1 - 3)  aluminum hydroxide/magnesium hydroxide/simethicone Suspension 30 milliLiter(s) Oral every 4 hours PRN Dyspepsia  melatonin 3 milliGRAM(s) Oral at bedtime PRN Insomnia  ondansetron Injectable 4 milliGRAM(s) IV Push every 8 hours PRN Nausea and/or Vomiting

## 2022-10-28 NOTE — PROGRESS NOTE ADULT - SUBJECTIVE AND OBJECTIVE BOX
Nephrology progress note    Patient is seen and examined, events over the last 24 h noted .  No complaints, HD this am  Allergies:  No Known Allergies    Hospital Medications:   MEDICATIONS  (STANDING):  aspirin enteric coated 81 milliGRAM(s) Oral daily  atorvastatin 40 milliGRAM(s) Oral at bedtime  Dakins Solution - 1/2 Strength 1 Application(s) Topical daily  DAPTOmycin IVPB 1000 milliGRAM(s) IV Intermittent every 24 hours  darbepoetin Injectable Syringe 40 MICROGram(s) IV Push <User Schedule>  fluconAZOLE   Tablet 100 milliGRAM(s) Oral every 24 hours  heparin   Injectable 5000 Unit(s) SubCutaneous every 12 hours  influenza   Vaccine 0.5 milliLiter(s) IntraMuscular once  insulin glargine Injectable (LANTUS) 12 Unit(s) SubCutaneous at bedtime  insulin lispro (ADMELOG) corrective regimen sliding scale   SubCutaneous three times a day before meals  meropenem  IVPB 1000 milliGRAM(s) IV Intermittent every 24 hours  NIFEdipine XL 30 milliGRAM(s) Oral daily  phenazopyridine 100 milliGRAM(s) Oral three times a day  polyethylene glycol 3350 17 Gram(s) Oral daily  senna 1 Tablet(s) Oral two times a day  sevelamer carbonate 800 milliGRAM(s) Oral three times a day        VITALS:  T(F): 97.3 (10-28-22 @ 04:55), Max: 98 (10-27-22 @ 08:17)  HR: 97 (10-28-22 @ 04:55)  BP: 146/67 (10-28-22 @ 04:55)  RR: 18 (10-28-22 @ 04:55)  SpO2: 97% (10-27-22 @ 21:00)  Wt(kg): --    10-26 @ 07:  -  10-27 @ 07:00  --------------------------------------------------------  IN: 240 mL / OUT: 2400 mL / NET: -2160 mL    10-27 @ 07:01  -  10-28 @ 07:00  --------------------------------------------------------  IN: 460 mL / OUT: 0 mL / NET: 460 mL      Height (cm): 175.3 (10-27 @ 10:30)  Weight (kg): 90.1 (10-27 @ 10:30)  BMI (kg/m2): 29.3 (10-27 @ 10:30)  BSA (m2): 2.06 (10-27 @ 10:30)    PHYSICAL EXAM:  Constitutional: NAD  HEENT: anicteric sclera  Neck: No JVD  Respiratory: CTA  Cardiovascular: S1, S2, RRR  Gastrointestinal: BS+, soft, NT/ND  Extremities: No peripheral edema  Neurological: A/O x 3  : No CVA tenderness. No schneider.   Skin: No rashes  Vascular Access: AVF    LABS:  10-26    135  |  92<L>  |  33<H>  ----------------------------<  156<H>  4.7   |  30  |  4.3<HH>    Ca    8.4      26 Oct 2022 23:31  Mg     2.0     10-26    TPro  7.2  /  Alb  2.7<L>  /  TBili  0.3  /  DBili      /  AST  14  /  ALT  6   /  AlkPhos  173<H>  10-26                          8.2    8.46  )-----------( 307      ( 26 Oct 2022 23:31 )             24.8       Urine Studies:  Urinalysis Basic - ( 23 Oct 2022 18:53 )    Color: Yellow / Appearance: Slightly Turbid / S.011 / pH:   Gluc:  / Ketone: Negative  / Bili: Negative / Urobili: <2 mg/dL   Blood:  / Protein: 100 mg/dL / Nitrite: Negative   Leuk Esterase: Small / RBC: 6 /HPF / WBC 13 /HPF   Sq Epi:  / Non Sq Epi: 9 /HPF / Bacteria: Negative        RADIOLOGY & ADDITIONAL STUDIES:  < from: US Kidney and Bladder (10.25.22 @ 09:06) >    Right kidney: 9.4 cm. No hydronephrosis or calculi. Simple cyst measuring   0.8 cm. Trace perinephric fluid.    Left kidney:  9.7 cm. No hydronephrosis or calculi.    Urinary bladder: No debris or calculus. Bilateral ureteral jets are   visualized. Prevoid volume of approximately 684 mL. Patient was unable to   urinate.    Prostate: enlarged with a volume of 89.8 mL.    IMPRESSION:    No hydronephrosis or calculi.    Enlarged prostate.    Prevoid volume of approximately 684 mL.    < end of copied text >

## 2022-10-28 NOTE — PROGRESS NOTE ADULT - ASSESSMENT
63 y/o with a PMHx of DM, HTN, ESRD (on dialysis M/W/F, last session on 10/14), CAD s/p 1 stent on 2008 and quadruple CABG on 2012, PAD s/p bilateral angioplasty at [facility], was transferred from [facility] complaining of weakness and unhealed wound in the left foot.    ESRD - HD to day 3 hts 2 K bath  UF 3 L   check phos, cont binders  renal diet    URINARY retention -  cc, BPH 89 cc (10/25/22)  -start Flomax, repeat Bladder scan  -d/c Phenazopyridine (avoid use in HD pts)    HTN - netter controlled with Nifedipine  Anemia - on Aranesp 40 weekly, on ABx, no Venofer    PVD -non-healing left foot ulcer s/p LLE angiogram with peroneal angioplasty and atherectomy 10/27/22  - vascular sx appreciated  - on Meropenum 1 gr daily, Daptomycin and Fluconazole    will follow

## 2022-10-29 LAB
GLUCOSE BLDC GLUCOMTR-MCNC: 136 MG/DL — HIGH (ref 70–99)
GLUCOSE BLDC GLUCOMTR-MCNC: 182 MG/DL — HIGH (ref 70–99)
GLUCOSE BLDC GLUCOMTR-MCNC: 201 MG/DL — HIGH (ref 70–99)
GLUCOSE BLDC GLUCOMTR-MCNC: 97 MG/DL — SIGNIFICANT CHANGE UP (ref 70–99)
PHOSPHATE SERPL-MCNC: 3.7 MG/DL — SIGNIFICANT CHANGE UP (ref 2.1–4.9)

## 2022-10-29 PROCEDURE — 99233 SBSQ HOSP IP/OBS HIGH 50: CPT

## 2022-10-29 RX ADMIN — Medication 4: at 17:05

## 2022-10-29 RX ADMIN — TAMSULOSIN HYDROCHLORIDE 0.4 MILLIGRAM(S): 0.4 CAPSULE ORAL at 22:08

## 2022-10-29 RX ADMIN — MEROPENEM 100 MILLIGRAM(S): 1 INJECTION INTRAVENOUS at 23:14

## 2022-10-29 RX ADMIN — ATORVASTATIN CALCIUM 40 MILLIGRAM(S): 80 TABLET, FILM COATED ORAL at 22:08

## 2022-10-29 RX ADMIN — Medication 1 APPLICATION(S): at 13:46

## 2022-10-29 RX ADMIN — Medication 81 MILLIGRAM(S): at 11:41

## 2022-10-29 RX ADMIN — SEVELAMER CARBONATE 800 MILLIGRAM(S): 2400 POWDER, FOR SUSPENSION ORAL at 05:21

## 2022-10-29 RX ADMIN — SEVELAMER CARBONATE 800 MILLIGRAM(S): 2400 POWDER, FOR SUSPENSION ORAL at 13:46

## 2022-10-29 RX ADMIN — HEPARIN SODIUM 5000 UNIT(S): 5000 INJECTION INTRAVENOUS; SUBCUTANEOUS at 17:04

## 2022-10-29 RX ADMIN — SEVELAMER CARBONATE 800 MILLIGRAM(S): 2400 POWDER, FOR SUSPENSION ORAL at 22:08

## 2022-10-29 RX ADMIN — HEPARIN SODIUM 5000 UNIT(S): 5000 INJECTION INTRAVENOUS; SUBCUTANEOUS at 05:21

## 2022-10-29 RX ADMIN — Medication 30 MILLIGRAM(S): at 05:21

## 2022-10-29 RX ADMIN — INSULIN GLARGINE 12 UNIT(S): 100 INJECTION, SOLUTION SUBCUTANEOUS at 22:08

## 2022-10-29 NOTE — PROGRESS NOTE ADULT - ASSESSMENT
63 y/o with a PMHx of DM, HTN, ESRD (on dialysis M/W/F, last session on 10/14), CAD s/p 1 stent on 2008 and quadruple CABG on 2012, PAD s/p bilateral angioplasty at [facility], was transferred from [facility] complaining of weakness and unhealed wound in the left foot.    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   b) No microvascular outflow demonstrates within the calcaneous. No hemodynamically significant stenosis within the left SFA and popliteal artery. No intervention performed  - Vascular planning on repeat angio with endovascular revascularization via tibial stick today with Dr. Malik  -Left Calcaneous OM s/p removal of FB 9/16  -Wound Cx 9/16 - Enterobacter cloacae. treated with two weeks post-HD vancomycin + cefepime  -wound CX 10/16 VRE Faecim, Enterobacter cloacae complex, Proteus vulgaris, Morganella morganii s/p debridement 10/21 Wound Cx Proteus mirabilis, VRE faecium, Pseudomonas putida and s/p debridement 10/24  -Podiatry planning on surgical debridement with wound VAC application on Monday - COVID swab on SUNDAY    #ESRD on HD  - HD as per nephrology     #CAD s/p stent and CABG   - on asa and statin   - holding plavix since 10/16  - continue BB  - Cardio consult requested by vascular - cardio aware     #DM II  -fs well controlled on current regimen     #Anemia of chronic disease due to ESKD s/p 1 PRBC preop   - transfused 1 PRBC    #Dysuria with pyuria  - Fluconazole  - follow up urine culture  - started pyridium     Progress Note Handoff  Pending Consults: podiatry follow up  Pending Tests: labs, COVID ON SUNDAY  Pending Results: labs, covid  Family Discussion: discussed labs, meds, podiatry debridement on Monday and overall plan of care with pt and medical staff. Remains acute for further debridement with podiatry on Monday  Disposition: Home_____/SNF__x____/Other_____/Unknown at this time_____  Spent over 35 min reviewing chart and on coordinating patient care during interdisciplinary rounds

## 2022-10-29 NOTE — PROGRESS NOTE ADULT - ASSESSMENT
63 y/o with a PMHx of DM, HTN, ESRD (on dialysis M/W/F, last session on 10/14), CAD s/p 1 stent on 2008 and quadruple CABG on 2012, PAD s/p bilateral angioplasty at [facility], was transferred from [facility] complaining of weakness and unhealed wound in the left foot.    ESRD - HD sp HD yesterday   check phos, cont binders  renal diet    URINARY retention -  cc, BPH 89 cc (10/25/22)  -cont Flomax, repeat Bladder scan  -d/c Phenazopyridine (avoid use in HD pts)    HTN - netter controlled with Nifedipine  Anemia - on Aranesp 40 weekly, on ABx, no Venofer    PVD -non-healing left foot ulcer s/p LLE angiogram with peroneal angioplasty and atherectomy 10/27/22  - vascular sx appreciated  - on Meropenum 1 gr daily    will follow

## 2022-10-29 NOTE — PROGRESS NOTE ADULT - SUBJECTIVE AND OBJECTIVE BOX
SCHWABACHER, LAWRENCE  62y  [facility] F3-4B 014 B    Patient is a 62y old  Male who presents with a Sepsis (19 Oct 2022 14:02)    INTERVAL HPI/OVERNIGHT EVENTS:  seen and examined this afternoon  feels well  s/p HD yesterday  surgery on Monday       Vital Signs Last 24 Hrs  T(C): 37 (29 Oct 2022 05:00), Max: 37 (29 Oct 2022 05:00)  T(F): 98.6 (29 Oct 2022 05:00), Max: 98.6 (29 Oct 2022 05:00)  HR: 92 (29 Oct 2022 05:00) (88 - 102)  BP: 141/54 (29 Oct 2022 05:00) (125/59 - 141/54)  BP(mean): --  RR: 18 (29 Oct 2022 05:00) (18 - 18)  SpO2: 97% (29 Oct 2022 05:00) (97% - 98%)    Parameters below as of 29 Oct 2022 05:00  Patient On (Oxygen Delivery Method): room air        PHYSICAL EXAM:  GENERAL: NAD, well-groomed, well-developed  HEAD:  Atraumatic, Normocephalic  EYES: EOMI, PERRLA, conjunctiva and sclera clear  NERVOUS SYSTEM:  Alert & Oriented X 4, Good concentration; moves all extremities   CHEST/LUNG: Clear to percussion bilaterally; No rales, rhonchi, wheezing, or rubs  HEART: Regular rate and rhythm; No murmurs, rubs, or gallops  ABDOMEN: Soft, Nontender, Nondistended; Bowel sounds present  EXTREMITIES:  left foot dressing   SKIN: left food dressing    Consultant(s) Notes Reviewed:  [x ] YES  [ ] NO  Care Discussed with Consultants/Other Providers [ x] YES  [ ] NO    LABS:                                    8.7    9.47  )-----------( 296      ( 28 Oct 2022 07:18 )             26.1       10-28    136  |  95<L>  |  51<H>  ----------------------------<  108<H>  4.9   |  24  |  5.8<HH>    Ca    8.5      28 Oct 2022 07:18  Phos  3.7     10-29  Mg     2.1     10-28    TPro  7.3  /  Alb  2.9<L>  /  TBili  0.3  /  DBili  x   /  AST  11  /  ALT  <5  /  AlkPhos  157<H>  10-28        MEDICATIONS  (STANDING):  aspirin enteric coated 81 milliGRAM(s) Oral daily  atorvastatin 40 milliGRAM(s) Oral at bedtime  Dakins Solution - 1/2 Strength 1 Application(s) Topical daily  darbepoetin Injectable Syringe 40 MICROGram(s) IV Push <User Schedule>  heparin   Injectable 5000 Unit(s) SubCutaneous every 12 hours  influenza   Vaccine 0.5 milliLiter(s) IntraMuscular once  insulin glargine Injectable (LANTUS) 12 Unit(s) SubCutaneous at bedtime  insulin lispro (ADMELOG) corrective regimen sliding scale   SubCutaneous three times a day before meals  meropenem  IVPB 1000 milliGRAM(s) IV Intermittent every 24 hours  NIFEdipine XL 30 milliGRAM(s) Oral daily  polyethylene glycol 3350 17 Gram(s) Oral daily  senna 1 Tablet(s) Oral two times a day  sevelamer carbonate 800 milliGRAM(s) Oral three times a day  tamsulosin 0.4 milliGRAM(s) Oral at bedtime    MEDICATIONS  (PRN):  acetaminophen     Tablet .. 650 milliGRAM(s) Oral every 6 hours PRN Temp greater or equal to 38C (100.4F), Mild Pain (1 - 3)  aluminum hydroxide/magnesium hydroxide/simethicone Suspension 30 milliLiter(s) Oral every 4 hours PRN Dyspepsia  melatonin 3 milliGRAM(s) Oral at bedtime PRN Insomnia  ondansetron Injectable 4 milliGRAM(s) IV Push every 8 hours PRN Nausea and/or Vomiting

## 2022-10-29 NOTE — PROGRESS NOTE ADULT - ASSESSMENT
63 y/o with a PMHx of DM, HTN, ESRD (on dialysis M/W/F, last session on 10/14), CAD s/p 1 stent on 2008 and quadruple CABG on 2012, PAD s/p bilateral angioplasty at [facility], was transferred from [facility] complaining of weakness and unhealed wound in the left foot.    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 hx of PAD   - IR for angiogram 10/20/22: a) Left lower extremity angiogram demonstrating chronically occluded PT and AT with patent anterior tibial vein bypass reconstituting into the DP. b) No microvascular outflow demonstrates within the calcaneous. No hemodynamically significant stenosis within the left SFA and popliteal artery. No intervention performed  - Blood cx 10/15:NTD   - Wound Cx 9/16 - Enterobacter cloacae  - treated with two weeks post-HD vancomycin + cefepime  - wound CX 10/16 VRE Faecim, Enterobacter cloacae complex, Proteus vulgaris, Morganella morganii   - s/p debridement 10/21 Wound Cx Proteus mirabilis, VRE faecium, Pseudomonas putida  - MRI L foot 10/17: a) Osteomyelitis and erosion of the calcaneus with a vertical pathologic fracture extending to the subtalar joint. Gangrene of overlying subcutaneous tissues. 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  - ID consult appreciated - will likely need at least 6 weeks from last debridement for calcaneal OM  - Cont meropenem 500mg IV daily   - C/w daptomycin 12 mg/kg q 48 hours to cover VRE faecium   - Creatine Kinase, Serum: 15 U/L (10.26.22 @ 06:53) -- check CK weekly 2/ daptomycin use (next check 11/2)  - Podiatry consult:  a) Local Wound Care; Wound Flushed w/ NS; Wound Packed w/ xeroform/ ABD x2 / DSD / Kerlix / Ace bandage  b) Weight Bearing Status; WBAT w/ Heel Touch w/ Surgical Shoe;   c) Continue w/ Local Wound Care; Q24 Dressing Changes;  - S/p Initial debridement 10/21, S/p 10/24/22 excisional debridement of soft tissue and bone of left foot; (pt given 1u pRBCs pre-op)  - S/p 10/27 left lower extremity angiogram, peroneal artery angioplasty and atherectomy 2/ occluded segment found on previous angiogram with Dr. Malik  - Cardio consult 10/26 appreciated patient at high risk for surgery  - Podiatry Plan for 3rd surgical debridement with wound VAC application after vascular procedure - scheduled for 10/31/22    #Dysuria - r/o UTI  - Pt already receiving meropenem - should cover most UTI causing bacteria  - UA 10/23 WBC 13, nitrite negative, LEC small, bacteria negative   - 10/25 urine cx (-)  - 10/25 Kidney & Bladder sonogram (-) for hydronephrosis   - DC fluconazole (10/25-10/28) in case of fungal origin 5 d. course per ID - stopped 10/28 2/ QTC   - EKG 10/25: . --> 10/27 . --> 10/28   - Per nephro d/c phenazopyridine in HD patient  - Started flomax 0.4 daily   - Repeat bladder scan    #HTN  - C/w nifedipine 30, metoprolol     # ESRD on HD  - HD as per nephrology   - C/w darbepoetin     # CAD s/p stent and CABG   - on statin   - Discuss with cardio. we're unable to hold aspirin. Cont aspirin   - holding plavix since 10/16  - continue BB  - Cardio consult 10/19 appreciated at moderate risk for surgery     # DM II  - Follow FS  - Insulin regimen     # Constipation - resolved   - Cont miralax daily, senna    #Misc  - Diet: DASH  - GI: PPI  - DVT: Heparin subq  - Dispo: Medicine 63 y/o with a PMHx of DM, HTN, ESRD (on dialysis M/W/F, last session on 10/14), CAD s/p 1 stent on 2008 and quadruple CABG on 2012, PAD s/p bilateral angioplasty at [facility], was transferred from [facility] complaining of weakness and unhealed wound in the left foot.    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 hx of PAD   - IR for angiogram 10/20/22: a) Left lower extremity angiogram demonstrating chronically occluded PT and AT with patent anterior tibial vein bypass reconstituting into the DP. b) No microvascular outflow demonstrates within the calcaneous. No hemodynamically significant stenosis within the left SFA and popliteal artery. No intervention performed  - Blood cx 10/15:NTD   - Wound Cx 9/16 - Enterobacter cloacae  - treated with two weeks post-HD vancomycin + cefepime  - wound CX 10/16 VRE Faecim, Enterobacter cloacae complex, Proteus vulgaris, Morganella morganii   - s/p debridement 10/21 Wound Cx Proteus mirabilis, VRE faecium, Pseudomonas putida  - MRI L foot 10/17: a) Osteomyelitis and erosion of the calcaneus with a vertical pathologic fracture extending to the subtalar joint. Gangrene of overlying subcutaneous tissues. 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  - ID consult appreciated - will likely need at least 6 weeks from last debridement for calcaneal OM  - Cont meropenem 500mg IV daily   - C/w daptomycin 12 mg/kg q 48 hours to cover VRE faecium   - Creatine Kinase, Serum: 15 U/L (10.26.22 @ 06:53) -- check CK weekly 2/ daptomycin use (next check 11/2)  - Podiatry consult:  a) Local Wound Care; Wound Flushed w/ NS; Wound Packed w/ xeroform/ ABD x2 / DSD / Kerlix / Ace bandage  b) Weight Bearing Status; WBAT w/ Heel Touch w/ Surgical Shoe;   c) Continue w/ Local Wound Care; Q24 Dressing Changes;  - S/p Initial debridement 10/21, S/p 10/24/22 excisional debridement of soft tissue and bone of left foot; (pt given 1u pRBCs pre-op)  - S/p 10/27 left lower extremity angiogram, peroneal artery angioplasty and atherectomy 2/ occluded segment found on previous angiogram with Dr. Malik  - Cardio consult 10/26 appreciated patient at high risk for surgery  - Podiatry Plan for 3rd surgical debridement with wound VAC application after vascular procedure - scheduled for 10/31/22  - COVID, preop labs, npo order ordered, hold heparin dose prior to procedure     #Dysuria - r/o UTI - improving   - Pt already receiving meropenem - should cover most UTI causing bacteria  - UA 10/23 WBC 13, nitrite negative, LEC small, bacteria negative   - 10/25 urine cx (-)  - 10/25 Kidney & Bladder sonogram (-) for hydronephrosis   -  cc, BPH 89 cc (10/25/22)  - DC fluconazole (10/25-10/28) in case of fungal origin 5 d. course per ID - stopped 10/28 2/ QTC   - EKG 10/25: . --> 10/27 . --> 10/28   - Per nephro d/c phenazopyridine in HD patient  - Started flomax 0.4 daily   - Repeat bladder scan    #HTN  - C/w nifedipine 30, metoprolol     # ESRD on HD  - HD as per nephrology   - C/w darbepoetin     # CAD s/p stent and CABG   - on statin   - Discuss with cardio. we're unable to hold aspirin. Cont aspirin   - holding plavix since 10/16  - continue BB  - Cardio consult 10/19 appreciated at moderate risk for surgery     # DM II  - Follow FS  - Insulin regimen     # Constipation - resolved   - Cont miralax daily, senna    #Misc  - Diet: DASH  - GI: PPI  - DVT: Heparin subq  - Dispo: Medicine

## 2022-10-29 NOTE — PROGRESS NOTE ADULT - SUBJECTIVE AND OBJECTIVE BOX
Nephrology progress note    THIS IS AN INCOMPLETE NOTE . FULL NOTE TO FOLLOW SHORTLY    Patient is seen and examined, events over the last 24 h noted .    Allergies:  No Known Allergies    Hospital Medications:   MEDICATIONS  (STANDING):  aspirin enteric coated 81 milliGRAM(s) Oral daily  atorvastatin 40 milliGRAM(s) Oral at bedtime  Dakins Solution - 1/2 Strength 1 Application(s) Topical daily  darbepoetin Injectable Syringe 40 MICROGram(s) IV Push <User Schedule>  heparin   Injectable 5000 Unit(s) SubCutaneous every 12 hours  influenza   Vaccine 0.5 milliLiter(s) IntraMuscular once  insulin glargine Injectable (LANTUS) 12 Unit(s) SubCutaneous at bedtime  insulin lispro (ADMELOG) corrective regimen sliding scale   SubCutaneous three times a day before meals  meropenem  IVPB 1000 milliGRAM(s) IV Intermittent every 24 hours  NIFEdipine XL 30 milliGRAM(s) Oral daily  polyethylene glycol 3350 17 Gram(s) Oral daily  senna 1 Tablet(s) Oral two times a day  sevelamer carbonate 800 milliGRAM(s) Oral three times a day  tamsulosin 0.4 milliGRAM(s) Oral at bedtime        VITALS:  T(F): 98.6 (10-29-22 @ 05:00), Max: 98.6 (10-29-22 @ 05:00)  HR: 92 (10-29-22 @ 05:00)  BP: 141/54 (10-29-22 @ 05:00)  RR: 18 (10-29-22 @ 05:00)  SpO2: 97% (10-29-22 @ 05:00)  Wt(kg): --    10-27 @   -  10-28 @ 07:00  --------------------------------------------------------  IN: 460 mL / OUT: 0 mL / NET: 460 mL    10-28 @ :  -  10-29 @ 07:00  --------------------------------------------------------  IN: 0 mL / OUT: 3700 mL / NET: -3700 mL          PHYSICAL EXAM:  Constitutional: NAD  HEENT: anicteric sclera, oropharynx clear, MMM  Neck: No JVD  Respiratory: CTAB, no wheezes, rales or rhonchi  Cardiovascular: S1, S2, RRR  Gastrointestinal: BS+, soft, NT/ND  Extremities: No cyanosis or clubbing. No peripheral edema  :  No schneider.   Skin: No rashes    LABS:  10-28    136  |  95<L>  |  51<H>  ----------------------------<  108<H>  4.9   |  24  |  5.8<HH>    Ca    8.5      28 Oct 2022 07:18  Mg     2.1     10-28    TPro  7.3  /  Alb  2.9<L>  /  TBili  0.3  /  DBili      /  AST  11  /  ALT  <5  /  AlkPhos  157<H>  10-28                          8.7    9.47  )-----------( 296      ( 28 Oct 2022 07:18 )             26.1       Urine Studies:  Urinalysis Basic - ( 23 Oct 2022 18:53 )    Color: Yellow / Appearance: Slightly Turbid / S.011 / pH:   Gluc:  / Ketone: Negative  / Bili: Negative / Urobili: <2 mg/dL   Blood:  / Protein: 100 mg/dL / Nitrite: Negative   Leuk Esterase: Small / RBC: 6 /HPF / WBC 13 /HPF   Sq Epi:  / Non Sq Epi: 9 /HPF / Bacteria: Negative          Iron 39, TIBC 133, %sat 29      [10-21-22 @ 10:30]  Ferritin 1126      [10-21-22 @ 10:30]  PTH -- (Ca 7.5)      [22 @ 16:00]   312  Vitamin D (25OH) 21      [22 @ 16:00]  HbA1c 5.8      [20 @ 06:46]  Lipid: chol 81, , HDL 22, LDL --      [10-15-22 @ 09:53]    HBsAb <3.0      [19 @ 17:44]  HBsAb Nonreact      [19 @ 17:44]  HBsAg Nonreact      [10-27-22 @ 04:09]  HBcAb Nonreact      [19 @ 17:44]  HCV 0.14, Nonreact      [10-27-22 @ 04:09]        RADIOLOGY & ADDITIONAL STUDIES:   Nephrology progress note  Patient is seen and examined, events over the last 24 h noted .  Lying in bed comfortable   Allergies:  No Known Allergies    Hospital Medications:   MEDICATIONS  (STANDING):  aspirin enteric coated 81 milliGRAM(s) Oral daily  atorvastatin 40 milliGRAM(s) Oral at bedtime  Dakins Solution - 1/2 Strength 1 Application(s) Topical daily  darbepoetin Injectable Syringe 40 MICROGram(s) IV Push <User Schedule>  heparin   Injectable 5000 Unit(s) SubCutaneous every 12 hours  influenza   Vaccine 0.5 milliLiter(s) IntraMuscular once  insulin glargine Injectable (LANTUS) 12 Unit(s) SubCutaneous at bedtime  insulin lispro (ADMELOG) corrective regimen sliding scale   SubCutaneous three times a day before meals  meropenem  IVPB 1000 milliGRAM(s) IV Intermittent every 24 hours  NIFEdipine XL 30 milliGRAM(s) Oral daily  polyethylene glycol 3350 17 Gram(s) Oral daily  senna 1 Tablet(s) Oral two times a day  sevelamer carbonate 800 milliGRAM(s) Oral three times a day  tamsulosin 0.4 milliGRAM(s) Oral at bedtime        VITALS:  T(F): 98.6 (10-29-22 @ 05:00), Max: 98.6 (10-29-22 @ 05:00)  HR: 92 (10-29-22 @ 05:00)  BP: 141/54 (10-29-22 @ 05:00)  RR: 18 (10-29-22 @ 05:00)  SpO2: 97% (10-29-22 @ 05:00)      10-27 @ :  -  10-28 @ 07:00  --------------------------------------------------------  IN: 460 mL / OUT: 0 mL / NET: 460 mL    10-28 @ :  -  10-29 @ 07:00  --------------------------------------------------------  IN: 0 mL / OUT: 3700 mL / NET: -3700 mL          PHYSICAL EXAM:  Constitutional: NAD  Neck: No JVD  Respiratory: CTAB  Cardiovascular: S1, S2, RRR  Gastrointestinal: BS+, soft, NT/ND  Extremities: No cyanosis or clubbing. No peripheral edema/ feet in dressing   :  No schneider.   Skin: No rashes    LABS:  10-28    136  |  95<L>  |  51<H>  ----------------------------<  108<H>  4.9   |  24  |  5.8<HH>    Ca    8.5      28 Oct 2022 07:18  Mg     2.1     10-28    TPro  7.3  /  Alb  2.9<L>  /  TBili  0.3  /  DBili      /  AST  11  /  ALT  <5  /  AlkPhos  157<H>  10-28                          8.7    9.47  )-----------( 296      ( 28 Oct 2022 07:18 )             26.1       Urine Studies:  Urinalysis Basic - ( 23 Oct 2022 18:53 )    Color: Yellow / Appearance: Slightly Turbid / S.011 / pH:   Gluc:  / Ketone: Negative  / Bili: Negative / Urobili: <2 mg/dL   Blood:  / Protein: 100 mg/dL / Nitrite: Negative   Leuk Esterase: Small / RBC: 6 /HPF / WBC 13 /HPF   Sq Epi:  / Non Sq Epi: 9 /HPF / Bacteria: Negative          Iron 39, TIBC 133, %sat 29      [10-21-22 @ 10:30]  Ferritin 1126      [10-21-22 @ 10:30]  PTH -- (Ca 7.5)      [22 @ 16:00]   312  Vitamin D (25OH) 21      [22 @ 16:00]  HbA1c 5.8      [20 @ 06:46]  Lipid: chol 81, , HDL 22, LDL --      [10-15-22 @ 09:53]    HBsAb <3.0      [19 @ 17:44]  HBsAb Nonreact      [19 @ 17:44]  HBsAg Nonreact      [10-27-22 @ 04:09]  HBcAb Nonreact      [19 @ 17:44]  HCV 0.14, Nonreact      [10-27-22 @ 04:09]        RADIOLOGY & ADDITIONAL STUDIES:

## 2022-10-29 NOTE — PROGRESS NOTE ADULT - SUBJECTIVE AND OBJECTIVE BOX
LAWRENCE SCHWABACHER 62y Male  MRN#: [id number]     Hospital Day: 15d    Pt is currently admitted with the primary diagnosis of  BACTEREMIA        HOSPITAL COURSE:     63 y/o with a PMHx of DM, HTN, ESRD (on dialysis M/W/F), CAD s/p 1 stent (2008) and 4x CABG (2012), PAD s/p bilateral angioplasty at [facility], was transferred from [facility] complaining of weakness and unhealed wound in the left foot.  Two months ago the patient inadvertently stepped on glass while cleaning his house injuring his left foot. His podiatrist removed glass fragments and sent him home on a week long course of antibiotics (patient unsure about which ABx). According to the patient the wound failed to improve, he progressively developed weakness on his legs, and recurring vomiting, which prompted him to go to [facility]'s ED 10/11/22. At [facility] patient was found to be septic and was started on IV Vancomycin 1000mg and Meropenem 500mg. Blood cultures grew ESBL E.Coli. An MRI of the foot showed "osteomyelitis of the calcaneus bone, with what appears to be a subacute fracture". Patient was evaluated by vascular surgery and podiatry at [facility], who recommended debriding the L foot wound. Patient requested to be transferred to [facility] where his vascular surgeons are. Pt underwent initial debridement 10/21,  second debridement 10/24/22. Continuing on meropenem. Added daptomycin for VRE coverage. Added fluconazole as patient is having significant dysuria. Patient is to have vascular procedure for occluded segment 10/27/22 with third DFU debridement scheduled for 10/31.     SUBJECTIVE     Overnight events  None    Subjective complaints  Pt was evaluated at bedside. Pt denied active complaints.                                             ----------------------------------------------------------  OBJECTIVE  PAST MEDICAL & SURGICAL HISTORY  CAD (coronary artery disease)  1 stent 2008    S/P CABG (coronary artery bypass graft)  x4    Diabetes mellitus    Transient ischemic attack (TIA)  2017; 2008    Chronic kidney disease (CKD)  Stage IV    Hypertension    Stented coronary artery  in 2008    Myocardial infarction  2012    PAD (peripheral artery disease)  S/p bypass left leg    HLD (hyperlipidemia)    BPH (benign prostatic hyperplasia)    Pain in left knee  s/p fall    OA (osteoarthritis)    [place] (hard of hearing)    Chronic anemia    S/P CABG (coronary artery bypass graft)  2012    H/O arterial bypass of lower limb  Left Lower Extremity (2016)    History of surgery  Left CEA (2017)  Left Pinkie toe Amputation (2014)  CABG x 4 (2012)  Card cath - stent (2008)      AV fistula  2019  LEFT AV FISTULA                                              -----------------------------------------------------------  ALLERGIES:  No Known Allergies                                            ------------------------------------------------------------    HOME MEDICATIONS  Home Medications:  aspirin 81 mg oral tablet: 1 tab(s) orally once a day (14 Sep 2022 17:42)  furosemide 40 mg oral tablet: 2 tab(s) orally once a day (14 Sep 2022 17:42)  Levemir 100 units/mL subcutaneous solution: 40 unit(s) subcutaneous once a day (at bedtime) (14 Sep 2022 17:42)  Metoprolol Tartrate 50 mg oral tablet: 1 tab(s) orally 2 times a day (14 Sep 2022 17:42)  Plavix 75 mg oral tablet: 1 tab(s) orally once a day (14 Sep 2022 17:42)  sevelamer carbonate 800 mg oral tablet: 1 tab(s) orally 3 times a day (with meals) (20 Sep 2022 11:36)  Trulicity Pen 1.5 mg/0.5 mL subcutaneous solution: 1.5  subcutaneous once a week (14 Sep 2022 17:42)                           MEDICATIONS:  STANDING MEDICATIONS  aspirin enteric coated 81 milliGRAM(s) Oral daily  atorvastatin 40 milliGRAM(s) Oral at bedtime  Dakins Solution - 1/2 Strength 1 Application(s) Topical daily  darbepoetin Injectable Syringe 40 MICROGram(s) IV Push <User Schedule>  heparin   Injectable 5000 Unit(s) SubCutaneous every 12 hours  influenza   Vaccine 0.5 milliLiter(s) IntraMuscular once  insulin glargine Injectable (LANTUS) 12 Unit(s) SubCutaneous at bedtime  insulin lispro (ADMELOG) corrective regimen sliding scale   SubCutaneous three times a day before meals  meropenem  IVPB 1000 milliGRAM(s) IV Intermittent every 24 hours  NIFEdipine XL 30 milliGRAM(s) Oral daily  polyethylene glycol 3350 17 Gram(s) Oral daily  senna 1 Tablet(s) Oral two times a day  sevelamer carbonate 800 milliGRAM(s) Oral three times a day  tamsulosin 0.4 milliGRAM(s) Oral at bedtime    PRN MEDICATIONS  acetaminophen     Tablet .. 650 milliGRAM(s) Oral every 6 hours PRN  aluminum hydroxide/magnesium hydroxide/simethicone Suspension 30 milliLiter(s) Oral every 4 hours PRN  melatonin 3 milliGRAM(s) Oral at bedtime PRN  ondansetron Injectable 4 milliGRAM(s) IV Push every 8 hours PRN                                            ------------------------------------------------------------  VITAL SIGNS: Last 24 Hours  T(C): 36.3 (28 Oct 2022 20:00), Max: 36.3 (28 Oct 2022 20:00)  T(F): 97.4 (28 Oct 2022 20:00), Max: 97.4 (28 Oct 2022 20:00)  HR: 88 (28 Oct 2022 20:00) (83 - 102)  BP: 127/58 (28 Oct 2022 20:00) (124/66 - 141/71)  BP(mean): --  RR: 18 (28 Oct 2022 20:00) (18 - 18)  SpO2: 98% (28 Oct 2022 20:00) (97% - 98%)      10-27-22 @ 07:01  -  10-28-22 @ 07:00  --------------------------------------------------------  IN: 460 mL / OUT: 0 mL / NET: 460 mL    10-28-22 @ 07:01  -  10-29-22 @ 05:51  --------------------------------------------------------  IN: 0 mL / OUT: 3400 mL / NET: -3400 mL                                             --------------------------------------------------------------  LABS:                        8.7    9.47  )-----------( 296      ( 28 Oct 2022 07:18 )             26.1     10-28    136  |  95<L>  |  51<H>  ----------------------------<  108<H>  4.9   |  24  |  5.8<HH>    Ca    8.5      28 Oct 2022 07:18  Mg     2.1     10-28    TPro  7.3  /  Alb  2.9<L>  /  TBili  0.3  /  DBili  x   /  AST  11  /  ALT  <5  /  AlkPhos  157<H>  10-28                                                              -------------------------------------------------------------  RADIOLOGY:                                            --------------------------------------------------------------    PHYSICAL EXAM:  GENERAL: NAD, lying in bed comfortably  HEENT:  Atraumatic, Normocephalic  NECK: Supple, trachea midline  CHEST/LUNG: BL BS CTA. Unlabored respirations  HEART: S1S2 audible; RRR,  ABDOMEN: Soft, Nontender, Nondistended.    EXTREMITIES: Warm, LLE swelling  NERVOUS SYSTEM:  Awake and alert.   SKIN: left foot swollen and wrapped                                            --------------------------------------------------------------

## 2022-10-30 LAB
GLUCOSE BLDC GLUCOMTR-MCNC: 110 MG/DL — HIGH (ref 70–99)
GLUCOSE BLDC GLUCOMTR-MCNC: 195 MG/DL — HIGH (ref 70–99)
GLUCOSE BLDC GLUCOMTR-MCNC: 196 MG/DL — HIGH (ref 70–99)
GLUCOSE BLDC GLUCOMTR-MCNC: 268 MG/DL — HIGH (ref 70–99)
SARS-COV-2 RNA SPEC QL NAA+PROBE: SIGNIFICANT CHANGE UP

## 2022-10-30 PROCEDURE — 99233 SBSQ HOSP IP/OBS HIGH 50: CPT

## 2022-10-30 RX ADMIN — Medication 81 MILLIGRAM(S): at 11:37

## 2022-10-30 RX ADMIN — TAMSULOSIN HYDROCHLORIDE 0.4 MILLIGRAM(S): 0.4 CAPSULE ORAL at 22:25

## 2022-10-30 RX ADMIN — INSULIN GLARGINE 12 UNIT(S): 100 INJECTION, SOLUTION SUBCUTANEOUS at 22:26

## 2022-10-30 RX ADMIN — HEPARIN SODIUM 5000 UNIT(S): 5000 INJECTION INTRAVENOUS; SUBCUTANEOUS at 05:59

## 2022-10-30 RX ADMIN — SENNA PLUS 1 TABLET(S): 8.6 TABLET ORAL at 06:05

## 2022-10-30 RX ADMIN — MEROPENEM 100 MILLIGRAM(S): 1 INJECTION INTRAVENOUS at 22:26

## 2022-10-30 RX ADMIN — SEVELAMER CARBONATE 800 MILLIGRAM(S): 2400 POWDER, FOR SUSPENSION ORAL at 05:59

## 2022-10-30 RX ADMIN — SEVELAMER CARBONATE 800 MILLIGRAM(S): 2400 POWDER, FOR SUSPENSION ORAL at 13:28

## 2022-10-30 RX ADMIN — ATORVASTATIN CALCIUM 40 MILLIGRAM(S): 80 TABLET, FILM COATED ORAL at 22:25

## 2022-10-30 RX ADMIN — Medication 6: at 17:25

## 2022-10-30 RX ADMIN — Medication 2: at 11:37

## 2022-10-30 RX ADMIN — HEPARIN SODIUM 5000 UNIT(S): 5000 INJECTION INTRAVENOUS; SUBCUTANEOUS at 17:25

## 2022-10-30 RX ADMIN — DAPTOMYCIN 138 MILLIGRAM(S): 500 INJECTION, POWDER, LYOPHILIZED, FOR SOLUTION INTRAVENOUS at 13:27

## 2022-10-30 RX ADMIN — SEVELAMER CARBONATE 800 MILLIGRAM(S): 2400 POWDER, FOR SUSPENSION ORAL at 22:25

## 2022-10-30 RX ADMIN — Medication 30 MILLIGRAM(S): at 05:59

## 2022-10-30 RX ADMIN — Medication 1 APPLICATION(S): at 11:36

## 2022-10-30 NOTE — PROGRESS NOTE ADULT - SUBJECTIVE AND OBJECTIVE BOX
Nephrology progress note    THIS IS AN INCOMPLETE NOTE . FULL NOTE TO FOLLOW SHORTLY    Patient is seen and examined, events over the last 24 h noted .    Allergies:  No Known Allergies    Hospital Medications:   MEDICATIONS  (STANDING):  aspirin enteric coated 81 milliGRAM(s) Oral daily  atorvastatin 40 milliGRAM(s) Oral at bedtime  Dakins Solution - 1/2 Strength 1 Application(s) Topical daily  DAPTOmycin IVPB 950 milliGRAM(s) IV Intermittent every 48 hours  darbepoetin Injectable Syringe 40 MICROGram(s) IV Push <User Schedule>  heparin   Injectable 5000 Unit(s) SubCutaneous every 12 hours  influenza   Vaccine 0.5 milliLiter(s) IntraMuscular once  insulin glargine Injectable (LANTUS) 12 Unit(s) SubCutaneous at bedtime  insulin lispro (ADMELOG) corrective regimen sliding scale   SubCutaneous three times a day before meals  meropenem  IVPB 1000 milliGRAM(s) IV Intermittent every 24 hours  NIFEdipine XL 30 milliGRAM(s) Oral daily  polyethylene glycol 3350 17 Gram(s) Oral daily  senna 1 Tablet(s) Oral two times a day  sevelamer carbonate 800 milliGRAM(s) Oral three times a day  tamsulosin 0.4 milliGRAM(s) Oral at bedtime        VITALS:  T(F): 97.8 (10-30-22 @ 05:27), Max: 97.8 (10-30-22 @ 05:27)  HR: 88 (10-30-22 @ 05:27)  BP: 137/65 (10-30-22 @ 05:27)  RR: 18 (10-30-22 @ 05:27)  SpO2: 98% (10-29-22 @ 13:08)  Wt(kg): --    10-28 @ 07:01  -  10-29 @ 07:00  --------------------------------------------------------  IN: 0 mL / OUT: 3700 mL / NET: -3700 mL          PHYSICAL EXAM:  Constitutional: NAD  HEENT: anicteric sclera, oropharynx clear, MMM  Neck: No JVD  Respiratory: CTAB, no wheezes, rales or rhonchi  Cardiovascular: S1, S2, RRR  Gastrointestinal: BS+, soft, NT/ND  Extremities: No cyanosis or clubbing. No peripheral edema  :  No schneider.   Skin: No rashes    LABS:    Phos  3.7     10-29          Urine Studies:  Urinalysis Basic - ( 23 Oct 2022 18:53 )    Color: Yellow / Appearance: Slightly Turbid / S.011 / pH:   Gluc:  / Ketone: Negative  / Bili: Negative / Urobili: <2 mg/dL   Blood:  / Protein: 100 mg/dL / Nitrite: Negative   Leuk Esterase: Small / RBC: 6 /HPF / WBC 13 /HPF   Sq Epi:  / Non Sq Epi: 9 /HPF / Bacteria: Negative          Iron 39, TIBC 133, %sat 29      [10-21-22 @ 10:30]  Ferritin 1126      [10-21-22 @ 10:30]  PTH -- (Ca 7.5)      [22 @ 16:00]   312  Vitamin D (25OH) 21      [22 @ 16:00]  HbA1c 5.8      [20 @ 06:46]  Lipid: chol 81, , HDL 22, LDL --      [10-15-22 @ 09:53]    HBsAb <3.0      [19 @ 17:44]  HBsAb Nonreact      [19 @ 17:44]  HBsAg Nonreact      [10-27-22 @ 04:09]  HBcAb Nonreact      [19 @ 17:44]  HCV 0.14, Nonreact      [10-27-22 @ 04:09]        RADIOLOGY & ADDITIONAL STUDIES:   Nephrology progress note  Patient is seen and examined, events over the last 24 h noted .  Lying in bed comfortable     Allergies:  No Known Allergies    Hospital Medications:   MEDICATIONS  (STANDING):    aspirin enteric coated 81 milliGRAM(s) Oral daily  atorvastatin 40 milliGRAM(s) Oral at bedtime  Dakins Solution - 1/2 Strength 1 Application(s) Topical daily  DAPTOmycin IVPB 950 milliGRAM(s) IV Intermittent every 48 hours  darbepoetin Injectable Syringe 40 MICROGram(s) IV Push <User Schedule>  heparin   Injectable 5000 Unit(s) SubCutaneous every 12 hours  influenza   Vaccine 0.5 milliLiter(s) IntraMuscular once  insulin glargine Injectable (LANTUS) 12 Unit(s) SubCutaneous at bedtime  insulin lispro (ADMELOG) corrective regimen sliding scale   SubCutaneous three times a day before meals  meropenem  IVPB 1000 milliGRAM(s) IV Intermittent every 24 hours  NIFEdipine XL 30 milliGRAM(s) Oral daily  polyethylene glycol 3350 17 Gram(s) Oral daily  senna 1 Tablet(s) Oral two times a day  sevelamer carbonate 800 milliGRAM(s) Oral three times a day  tamsulosin 0.4 milliGRAM(s) Oral at bedtime        VITALS:  T(F): 97.8 (10-30-22 @ 05:27), Max: 97.8 (10-30-22 @ 05:27)  HR: 88 (10-30-22 @ 05:27)  BP: 137/65 (10-30-22 @ 05:27)  RR: 18 (10-30-22 @ 05:27)  SpO2: 98% (10-29-22 @ 13:08)      10-28 @ 07:01  -  10-29 @ 07:00  --------------------------------------------------------  IN: 0 mL / OUT: 3700 mL / NET: -3700 mL          PHYSICAL EXAM:  Constitutional: NAD  Neck: No JVD  Respiratory: CTAB,  Cardiovascular: S1, S2, RRR  Gastrointestinal: BS+, soft, NT/ND  Extremities: No cyanosis or clubbing. No peripheral edema/ foot in dressing   :  No schneider.   Skin: No rashes    LABS:    Phos  3.7     10-29          Urine Studies:  Urinalysis Basic - ( 23 Oct 2022 18:53 )    Color: Yellow / Appearance: Slightly Turbid / S.011 / pH:   Gluc:  / Ketone: Negative  / Bili: Negative / Urobili: <2 mg/dL   Blood:  / Protein: 100 mg/dL / Nitrite: Negative   Leuk Esterase: Small / RBC: 6 /HPF / WBC 13 /HPF   Sq Epi:  / Non Sq Epi: 9 /HPF / Bacteria: Negative          Iron 39, TIBC 133, %sat 29      [10-21-22 @ 10:30]  Ferritin 1126      [10-21-22 @ 10:30]  PTH -- (Ca 7.5)      [22 @ 16:00]   312  Vitamin D (25OH) 21      [22 @ 16:00]  HbA1c 5.8      [20 @ 06:46]  Lipid: chol 81, , HDL 22, LDL --      [10-15-22 @ 09:53]    HBsAb <3.0      [19 @ 17:44]  HBsAb Nonreact      [19 @ 17:44]  HBsAg Nonreact      [10-27-22 @ 04:09]  HBcAb Nonreact      [19 @ 17:44]  HCV 0.14, Nonreact      [10-27-22 @ 04:09]        RADIOLOGY & ADDITIONAL STUDIES:

## 2022-10-30 NOTE — PROGRESS NOTE ADULT - ASSESSMENT
61 y/o with a PMHx of DM, HTN, ESRD (on dialysis M/W/F, last session on 10/14), CAD s/p 1 stent on 2008 and quadruple CABG on 2012, PAD s/p bilateral angioplasty at [facility], was transferred from [facility] complaining of weakness and unhealed wound in the left foot.    ESRD - HD sp HD friday  next in AM    Phos noted , cont binders  renal diet    URINARY retention -  cc, BPH 89 cc (10/25/22)  -cont Flomax, repeat Bladder scan  -d/c Phenazopyridine (avoid use in HD pts)    HTN - netter controlled with Nifedipine  Anemia - on Aranesp 40 weekly, on ABx, no Venofer    PVD -non-healing left foot ulcer s/p LLE angiogram with peroneal angioplasty and atherectomy 10/27/22  - vascular sx appreciated  - on Meropenum 1 gr daily    will follow

## 2022-10-30 NOTE — PROGRESS NOTE ADULT - ASSESSMENT
63 y/o with a PMHx of DM, HTN, ESRD (on dialysis M/W/F, last session on 10/14), CAD s/p 1 stent on 2008 and quadruple CABG on 2012, PAD s/p bilateral angioplasty at [facility], was transferred from [facility] complaining of weakness and unhealed wound in the left foot.    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   b) No microvascular outflow demonstrates within the calcaneous. No hemodynamically significant stenosis within the left SFA and popliteal artery. No intervention performed  - Vascular planning on repeat angio with endovascular revascularization via tibial stick today with Dr. Malik  -Left Calcaneous OM s/p removal of FB 9/16  -Wound Cx 9/16 - Enterobacter cloacae. treated with two weeks post-HD vancomycin + cefepime  -wound CX 10/16 VRE Faecim, Enterobacter cloacae complex, Proteus vulgaris, Morganella morganii s/p debridement 10/21 Wound Cx Proteus mirabilis, VRE faecium, Pseudomonas putida and s/p debridement 10/24  -Podiatry planning on surgical debridement with wound VAC application on Monday - COVID swab to be done today - rn aware  - preop labs tonight at 8pm     #ESRD on HD  - HD as per nephrology     #CAD s/p stent and CABG   - on asa and statin   - holding plavix since 10/16  - continue BB  - Cardio consult requested by vascular - cardio aware     #DM II  -fs well controlled on current regimen     #Anemia of chronic disease due to ESKD s/p 1 PRBC preop   - transfused 1 PRBC    #Dysuria with pyuria - resolved  - Fluconazole  - on flomax  - urine culture negative    Progress Note Handoff  Pending Consults: podiatry follow up  Pending Tests: labs, COVID ON SUNDAY  Pending Results: labs, covid  Family Discussion: discussed labs, meds, podiatry debridement on Monday and overall plan of care with pt and medical staff. Remains acute for further debridement with podiatry on Monday. Will likely need SNF- CM aware  Disposition: Home_____/SNF__x____/Other_____/Unknown at this time_____  Spent over 35 min reviewing chart and on coordinating patient care during interdisciplinary rounds

## 2022-10-30 NOTE — PROGRESS NOTE ADULT - SUBJECTIVE AND OBJECTIVE BOX
SCHWABACHER, LAWRENCE  62y  [facility] [id number]    Patient is a 62y old  Male who presents with Sepsis (19 Oct 2022 14:02)    INTERVAL HPI/OVERNIGHT EVENTS:  seen and examined this afternoon  feels well; sitting in the chair  surgery on Monday with podiatry   preop labs today at 8pm and covid swab this am - rn aware      Vital Signs Last 24 Hrs  T(C): 36.6 (30 Oct 2022 05:27), Max: 36.6 (30 Oct 2022 05:27)  T(F): 97.8 (30 Oct 2022 05:27), Max: 97.8 (30 Oct 2022 05:27)  HR: 88 (30 Oct 2022 05:27) (87 - 97)  BP: 137/65 (30 Oct 2022 05:27) (121/65 - 155/67)  BP(mean): --  RR: 18 (30 Oct 2022 05:27) (18 - 18)  SpO2: 98% (29 Oct 2022 13:08) (98% - 98%)    Parameters below as of 29 Oct 2022 13:08  Patient On (Oxygen Delivery Method): room air      PHYSICAL EXAM:  GENERAL: NAD, well-groomed, well-developed  HEAD:  Atraumatic, Normocephalic  EYES: EOMI, PERRLA, conjunctiva and sclera clear  NERVOUS SYSTEM:  Alert & Oriented X 4, Good concentration; moves all extremities   CHEST/LUNG: Clear to percussion bilaterally; No rales, rhonchi, wheezing, or rubs  HEART: Regular rate and rhythm; No murmurs, rubs, or gallops  ABDOMEN: Soft, Nontender, Nondistended; Bowel sounds present  EXTREMITIES:  left foot dressing + edema  SKIN: left food dressing    Consultant(s) Notes Reviewed:  [x ] YES  [ ] NO  Care Discussed with Consultants/Other Providers [ x] YES  [ ] NO    LABS: Pending for 8pm tonight                                    8.7    9.47  )-----------( 296      ( 28 Oct 2022 07:18 )             26.1       10-28    136  |  95<L>  |  51<H>  ----------------------------<  108<H>  4.9   |  24  |  5.8<HH>    Ca    8.5      28 Oct 2022 07:18  Phos  3.7     10-29  Mg     2.1     10-28    TPro  7.3  /  Alb  2.9<L>  /  TBili  0.3  /  DBili  x   /  AST  11  /  ALT  <5  /  AlkPhos  157<H>  10-28      MEDICATIONS  (STANDING):  aspirin enteric coated 81 milliGRAM(s) Oral daily  atorvastatin 40 milliGRAM(s) Oral at bedtime  Dakins Solution - 1/2 Strength 1 Application(s) Topical daily  DAPTOmycin IVPB 950 milliGRAM(s) IV Intermittent every 48 hours  darbepoetin Injectable Syringe 40 MICROGram(s) IV Push <User Schedule>  heparin   Injectable 5000 Unit(s) SubCutaneous every 12 hours  influenza   Vaccine 0.5 milliLiter(s) IntraMuscular once  insulin glargine Injectable (LANTUS) 12 Unit(s) SubCutaneous at bedtime  insulin lispro (ADMELOG) corrective regimen sliding scale   SubCutaneous three times a day before meals  meropenem  IVPB 1000 milliGRAM(s) IV Intermittent every 24 hours  NIFEdipine XL 30 milliGRAM(s) Oral daily  polyethylene glycol 3350 17 Gram(s) Oral daily  senna 1 Tablet(s) Oral two times a day  sevelamer carbonate 800 milliGRAM(s) Oral three times a day  tamsulosin 0.4 milliGRAM(s) Oral at bedtime    MEDICATIONS  (PRN):  acetaminophen     Tablet .. 650 milliGRAM(s) Oral every 6 hours PRN Temp greater or equal to 38C (100.4F), Mild Pain (1 - 3)  aluminum hydroxide/magnesium hydroxide/simethicone Suspension 30 milliLiter(s) Oral every 4 hours PRN Dyspepsia  melatonin 3 milliGRAM(s) Oral at bedtime PRN Insomnia  ondansetron Injectable 4 milliGRAM(s) IV Push every 8 hours PRN Nausea and/or Vomiting

## 2022-10-31 ENCOUNTER — APPOINTMENT (OUTPATIENT)
Dept: NEUROLOGY | Facility: CLINIC | Age: 62
End: 2022-10-31

## 2022-10-31 LAB
ANION GAP SERPL CALC-SCNC: 14 MMOL/L — SIGNIFICANT CHANGE UP (ref 7–14)
BLD GP AB SCN SERPL QL: SIGNIFICANT CHANGE UP
BUN SERPL-MCNC: 61 MG/DL — CRITICAL HIGH (ref 10–20)
CALCIUM SERPL-MCNC: 7.9 MG/DL — LOW (ref 8.4–10.5)
CHLORIDE SERPL-SCNC: 94 MMOL/L — LOW (ref 98–110)
CO2 SERPL-SCNC: 25 MMOL/L — SIGNIFICANT CHANGE UP (ref 17–32)
CREAT SERPL-MCNC: 7.3 MG/DL — CRITICAL HIGH (ref 0.7–1.5)
EGFR: 8 ML/MIN/1.73M2 — LOW
GLUCOSE BLDC GLUCOMTR-MCNC: 112 MG/DL — HIGH (ref 70–99)
GLUCOSE BLDC GLUCOMTR-MCNC: 121 MG/DL — HIGH (ref 70–99)
GLUCOSE BLDC GLUCOMTR-MCNC: 122 MG/DL — HIGH (ref 70–99)
GLUCOSE BLDC GLUCOMTR-MCNC: 233 MG/DL — HIGH (ref 70–99)
GLUCOSE BLDC GLUCOMTR-MCNC: 235 MG/DL — HIGH (ref 70–99)
GLUCOSE SERPL-MCNC: 106 MG/DL — HIGH (ref 70–99)
HCT VFR BLD CALC: 22.1 % — LOW (ref 42–52)
HGB BLD-MCNC: 7.2 G/DL — LOW (ref 14–18)
INR BLD: 1.01 RATIO — SIGNIFICANT CHANGE UP (ref 0.65–1.3)
MAGNESIUM SERPL-MCNC: 2 MG/DL — SIGNIFICANT CHANGE UP (ref 1.8–2.4)
MCHC RBC-ENTMCNC: 31.2 PG — HIGH (ref 27–31)
MCHC RBC-ENTMCNC: 32.6 G/DL — SIGNIFICANT CHANGE UP (ref 32–37)
MCV RBC AUTO: 95.7 FL — HIGH (ref 80–94)
NRBC # BLD: 0 /100 WBCS — SIGNIFICANT CHANGE UP (ref 0–0)
PLATELET # BLD AUTO: 238 K/UL — SIGNIFICANT CHANGE UP (ref 130–400)
POTASSIUM SERPL-MCNC: 4.6 MMOL/L — SIGNIFICANT CHANGE UP (ref 3.5–5)
POTASSIUM SERPL-SCNC: 4.6 MMOL/L — SIGNIFICANT CHANGE UP (ref 3.5–5)
PROTHROM AB SERPL-ACNC: 11.5 SEC — SIGNIFICANT CHANGE UP (ref 9.95–12.87)
RBC # BLD: 2.31 M/UL — LOW (ref 4.7–6.1)
RBC # FLD: 14.4 % — SIGNIFICANT CHANGE UP (ref 11.5–14.5)
SODIUM SERPL-SCNC: 133 MMOL/L — LOW (ref 135–146)
WBC # BLD: 8.67 K/UL — SIGNIFICANT CHANGE UP (ref 4.8–10.8)
WBC # FLD AUTO: 8.67 K/UL — SIGNIFICANT CHANGE UP (ref 4.8–10.8)

## 2022-10-31 PROCEDURE — 99233 SBSQ HOSP IP/OBS HIGH 50: CPT

## 2022-10-31 RX ORDER — HEPARIN SODIUM 5000 [USP'U]/ML
5000 INJECTION INTRAVENOUS; SUBCUTANEOUS EVERY 12 HOURS
Refills: 0 | Status: DISCONTINUED | OUTPATIENT
Start: 2022-10-31 | End: 2022-11-02

## 2022-10-31 RX ADMIN — SEVELAMER CARBONATE 800 MILLIGRAM(S): 2400 POWDER, FOR SUSPENSION ORAL at 23:28

## 2022-10-31 RX ADMIN — MEROPENEM 100 MILLIGRAM(S): 1 INJECTION INTRAVENOUS at 22:32

## 2022-10-31 RX ADMIN — Medication 1 APPLICATION(S): at 13:53

## 2022-10-31 RX ADMIN — Medication 30 MILLIGRAM(S): at 06:54

## 2022-10-31 RX ADMIN — SEVELAMER CARBONATE 800 MILLIGRAM(S): 2400 POWDER, FOR SUSPENSION ORAL at 06:55

## 2022-10-31 RX ADMIN — SENNA PLUS 1 TABLET(S): 8.6 TABLET ORAL at 17:09

## 2022-10-31 RX ADMIN — ATORVASTATIN CALCIUM 40 MILLIGRAM(S): 80 TABLET, FILM COATED ORAL at 23:27

## 2022-10-31 RX ADMIN — INSULIN GLARGINE 12 UNIT(S): 100 INJECTION, SOLUTION SUBCUTANEOUS at 23:26

## 2022-10-31 RX ADMIN — TAMSULOSIN HYDROCHLORIDE 0.4 MILLIGRAM(S): 0.4 CAPSULE ORAL at 23:28

## 2022-10-31 RX ADMIN — SEVELAMER CARBONATE 800 MILLIGRAM(S): 2400 POWDER, FOR SUSPENSION ORAL at 17:08

## 2022-10-31 NOTE — PHARMACOTHERAPY INTERVENTION NOTE - NSPHARMCOMMASP
ASP - Lab/ test recommended
ASP - Renal dose adjustment
ASP - Incorrect timing
ASP - Lab/ test recommended
ASP - Lab/ test recommended
ASP - Renal dose adjustment

## 2022-10-31 NOTE — PROGRESS NOTE ADULT - ASSESSMENT
61 y/o with a PMHx of DM, HTN, ESRD (on dialysis M/W/F, last session on 10/14), CAD s/p 1 stent on 2008 and quadruple CABG on 2012, PAD s/p bilateral angioplasty at [facility], was transferred from [facility] complaining of weakness and unhealed wound in the left foot.    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 hx of PAD   - IR for angiogram 10/20/22: a) Left lower extremity angiogram demonstrating chronically occluded PT and AT with patent anterior tibial vein bypass reconstituting into the DP. b) No microvascular outflow demonstrates within the calcaneous. No hemodynamically significant stenosis within the left SFA and popliteal artery. No intervention performed  - Blood cx 10/15:NTD   - Wound Cx 9/16 - Enterobacter cloacae  - treated with two weeks post-HD vancomycin + cefepime  - wound CX 10/16 VRE Faecim, Enterobacter cloacae complex, Proteus vulgaris, Morganella morganii   - s/p debridement 10/21 Wound Cx Proteus mirabilis, VRE faecium, Pseudomonas putida  - MRI L foot 10/17: a) Osteomyelitis and erosion of the calcaneus with a vertical pathologic fracture extending to the subtalar joint. Gangrene of overlying subcutaneous tissues. 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  - ID consult appreciated - will likely need at least 6 weeks from last debridement for calcaneal OM  - Cont meropenem 500mg IV daily   - C/w daptomycin 12 mg/kg q 48 hours to cover VRE faecium   - Creatine Kinase, Serum: 15 U/L (10.26.22 @ 06:53) -- check CK weekly 2/ daptomycin use (next check 11/2)  - Podiatry consult:  a) Local Wound Care; Wound Flushed w/ NS; Wound Packed w/ xeroform/ ABD x2 / DSD / Kerlix / Ace bandage  b) Weight Bearing Status; WBAT w/ Heel Touch w/ Surgical Shoe;   c) Continue w/ Local Wound Care; Q24 Dressing Changes;  - S/p Initial debridement 10/21, S/p 10/24/22 excisional debridement of soft tissue and bone of left foot; (pt given 1u pRBCs pre-op)  - S/p 10/27 left lower extremity angiogram, peroneal artery angioplasty and atherectomy 2/ occluded segment found on previous angiogram with Dr. Malik  - Cardio consult 10/26 appreciated patient at high risk for surgery  - Podiatry Plan for 3rd surgical debridement with wound VAC application after vascular procedure - scheduled for 11/3/22    #Dysuria - r/o UTI - improving   #H/o BPH  - Pt already receiving meropenem - should cover most UTI causing bacteria  - UA 10/23 WBC 13, nitrite negative, LEC small, bacteria negative   - 10/25 urine cx (-)  - 10/25 Kidney & Bladder sonogram (-) for hydronephrosis   -  cc, BPH 89 cc (10/25/22)  - DC fluconazole (10/25-10/28) in case of fungal origin 5 d. course per ID - stopped 10/28 2/ QTC   - EKG 10/25: . --> 10/27 . --> 10/28   - Per nephro d/c phenazopyridine in HD patient  - Started Flomax 0.4mg PO q24h  - Repeat bladder scan    #HTN  - C/w nifedipine 30mg PO q24h    # ESRD on HD  - HD as per nephrology   - C/w darbepoetin  - C/w Sevelamer 800mg TID    # CAD s/p stent and CABG   - Lipitor 40mg qHS  - Discuss with cardio. we're unable to hold aspirin. Cont aspirin   - holding plavix since 10/16  - Continue BB  - Cardio consult 10/19 appreciated at moderate risk for surgery     # DM II  - Follow FSG  - Lantus 12U qHS, +2 CF insulin sliding scale    # Constipation - resolved   - C/w Miralax, senna daily    #Other  Diet: DASH/TLC, Renal  Activity: Increase as tolerated, LLE toe touch weight bearing with surgical shoe  GI PPX: None  DVT PPX: Heparin 5000U subQ q12h  Dispo: From home, pending debridement revision

## 2022-10-31 NOTE — PROGRESS NOTE ADULT - SUBJECTIVE AND OBJECTIVE BOX
LAWRENCE SCHWABACHER 62y Male  MRN#: [id number]  Hospital Day: 17d    Pt is currently admitted with the primary diagnosis of sepsis POA secondary to diabetic foot ulcer    HPI: 61 y/o with a PMHx of DM, HTN, ESRD (on dialysis M/W/F, last session on 10/14), CAD s/p 1 stent on 2008 and quadruple CABG on 2012, PAD s/p bilateral angioplasty at [facility], was transferred from [facility] complaining of weakness and unhealed wound in the left foot.    SUBJECTIVE  Overnight events: None  Subjective complaints: None. Pt received HD today.     Debridement originally scheduled for today postponed for 11/3.    Present Today:   - Lacey:  No [ x ], Yes [   ] : Indication:     - Type of IV Access:       .. CVC/Piccline:  No [ x ], Yes [   ] : Indication:       .. Midline: No [ x ], Yes [   ] : Indication:                                             ----------------------------------------------------------  OBJECTIVE    VITAL SIGNS: Last 24 Hours  T(C): 36.1 (31 Oct 2022 05:10), Max: 36.1 (31 Oct 2022 05:10)  T(F): 97 (31 Oct 2022 05:10), Max: 97 (31 Oct 2022 05:10)  HR: 90 (31 Oct 2022 11:05) (86 - 99)  BP: 129/64 (31 Oct 2022 11:05) (103/68 - 146/75)  BP(mean): --  RR: 18 (31 Oct 2022 11:05) (18 - 18)  SpO2: --      10-30-22 @ 07:01  -  10-31-22 @ 07:00  --------------------------------------------------------  IN: 0 mL / OUT: 400 mL / NET: -400 mL    10-31-22 @ 07:01  -  10-31-22 @ 19:47  --------------------------------------------------------  IN: 370 mL / OUT: 2100 mL / NET: -1730 mL        PHYSICAL EXAM:  General: Laying in bed. In NAD.  HEENT: Normocephalic, nontraumatic. PERRL.  LUNGS: Clear to auscultation b/l. No wheezes, rales, or rhonchi.  HEART: RRR. No murmurs, rubs, or gallops.  ABDOMEN: Soft, nontender, nondistended. Normoactive bowel sounds.  EXT: Pulses 2+ UE, diminished in RLE, unable to check LLE. No lower extremity edema. Left foot with dressing, intact and clean.  NEURO: AAOx4.  SKIN: Warm, dry.    PAST MEDICAL & SURGICAL HISTORY  CAD (coronary artery disease) 1 stent 2008  S/P CABG (coronary artery bypass graft) x4  Diabetes mellitus  Transient ischemic attack (TIA) 2017; 2008  Chronic kidney disease (CKD) Stage IV  Hypertension  Stented coronary artery in 2008  Myocardial infarction 2012  PAD (peripheral artery disease)  S/p bypass left leg  HLD (hyperlipidemia)  BPH (benign prostatic hyperplasia)  Pain in left knee s/p fall  OA (osteoarthritis)  [place] (hard of hearing)  Chronic anemia  S/P CABG (coronary artery bypass graft) 2012  H/O arterial bypass of lower limb Left Lower Extremity (2016)    History of surgery  Left CEA (2017)  Left Pinkie toe Amputation (2014)  CABG x 4 (2012)  Card cath - stent (2008)  AV fistula 2019  LEFT AV FISTULA                                            -----------------------------------------------------------  ALLERGIES:  No Known Allergies                                            ------------------------------------------------------------    HOME MEDICATIONS  Home Medications:  aspirin 81 mg oral tablet: 1 tab(s) orally once a day (14 Sep 2022 17:42)  furosemide 40 mg oral tablet: 2 tab(s) orally once a day (14 Sep 2022 17:42)  Levemir 100 units/mL subcutaneous solution: 40 unit(s) subcutaneous once a day (at bedtime) (14 Sep 2022 17:42)  Metoprolol Tartrate 50 mg oral tablet: 1 tab(s) orally 2 times a day (14 Sep 2022 17:42)  Plavix 75 mg oral tablet: 1 tab(s) orally once a day (14 Sep 2022 17:42)  sevelamer carbonate 800 mg oral tablet: 1 tab(s) orally 3 times a day (with meals) (20 Sep 2022 11:36)  Trulicity Pen 1.5 mg/0.5 mL subcutaneous solution: 1.5  subcutaneous once a week (14 Sep 2022 17:42)                           MEDICATIONS:  STANDING MEDICATIONS  aspirin enteric coated 81 milliGRAM(s) Oral daily  atorvastatin 40 milliGRAM(s) Oral at bedtime  Dakins Solution - 1/2 Strength 1 Application(s) Topical daily  DAPTOmycin IVPB 950 milliGRAM(s) IV Intermittent every 48 hours  darbepoetin Injectable Syringe 40 MICROGram(s) IV Push <User Schedule>  influenza   Vaccine 0.5 milliLiter(s) IntraMuscular once  insulin glargine Injectable (LANTUS) 12 Unit(s) SubCutaneous at bedtime  insulin lispro (ADMELOG) corrective regimen sliding scale   SubCutaneous three times a day before meals  meropenem  IVPB 1000 milliGRAM(s) IV Intermittent every 24 hours  NIFEdipine XL 30 milliGRAM(s) Oral daily  polyethylene glycol 3350 17 Gram(s) Oral daily  senna 1 Tablet(s) Oral two times a day  sevelamer carbonate 800 milliGRAM(s) Oral three times a day  tamsulosin 0.4 milliGRAM(s) Oral at bedtime    PRN MEDICATIONS  acetaminophen     Tablet .. 650 milliGRAM(s) Oral every 6 hours PRN  aluminum hydroxide/magnesium hydroxide/simethicone Suspension 30 milliLiter(s) Oral every 4 hours PRN  melatonin 3 milliGRAM(s) Oral at bedtime PRN  ondansetron Injectable 4 milliGRAM(s) IV Push every 8 hours PRN                                           --------------------------------------------------------------  LABS:                        7.2    8.67  )-----------( 238      ( 31 Oct 2022 05:15 )             22.1     10-31    133<L>  |  94<L>  |  61<HH>  ----------------------------<  106<H>  4.6   |  25  |  7.3<HH>    Ca    7.9<L>      31 Oct 2022 05:15  Mg     2.0     10-31    PT/INR - ( 31 Oct 2022 05:15 )   PT: 11.50 sec;   INR: 1.01 ratio      CAPILLARY BLOOD GLUCOSE  POCT Blood Glucose.: 121 mg/dL (31 Oct 2022 16:32)  POCT Blood Glucose.: 112 mg/dL (31 Oct 2022 13:31)  POCT Blood Glucose.: 122 mg/dL (31 Oct 2022 08:00)  POCT Blood Glucose.: 196 mg/dL (30 Oct 2022 22:22)                                            -------------------------------------------------------------  RADIOLOGY:  < from: VA Duplex Lower Ext Vein Scan, Bilat (10.26.22 @ 07:52) >  Impression: Bilateral lower extremity vein mapping with the above measurements.      < from: US Kidney and Bladder (10.25.22 @ 09:06) >  IMPRESSION: No hydronephrosis or calculi. Enlarged prostate. Prevoid volume of approximately 684 mL.      < from: MR Lower Ext Joint w/ IV Cont, Left (10.17.22 @ 14:56) >  IMPRESSION:  Osteomyelitis and erosion of the calcaneus with a vertical pathologic fracture extending to the subtalar joint. Gangrene of overlying subcutaneous tissues. 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                                            --------------------------------------------------------------

## 2022-10-31 NOTE — PROGRESS NOTE ADULT - SUBJECTIVE AND OBJECTIVE BOX
Patient is a 62y old  Male who presents with a chief complaint of Sepsis (31 Oct 2022 10:06)    Patient was seen and examined.  left foot debridement Surgery rescheduled for 11/3  no new complaints    PAST MEDICAL & SURGICAL HISTORY:  CAD (coronary artery disease), Myocardial infarction 2012, 1 stent 2008 S/P CABG (coronary artery bypass graft) x4  Diabetes mellitus  Transient ischemic attack (TIA)2017; 2008  Chronic kidney disease (CKD), Stage IV  Hypertension  PAD (peripheral artery disease) S/p bypass left leg  HLD (hyperlipidemia)  BPH (benign prostatic hyperplasia)  Pain in left knee s/p fall  OA (osteoarthritis)  [place] (hard of hearing)  Chronic anemia  H/O arterial bypass of lower limb Left Lower Extremity (2016)    History of surgery  Left CEA (2017)  Left Pinkie toe Amputation (2014)  CABG x 4 (2012)  Card cath - stent (2008)  AV fistula 2019  LEFT AV FISTULA    Allergies  No Known Allergies    MEDICATIONS  (STANDING):  aspirin enteric coated 81 milliGRAM(s) Oral daily  atorvastatin 40 milliGRAM(s) Oral at bedtime  Dakins Solution - 1/2 Strength 1 Application(s) Topical daily  DAPTOmycin IVPB 950 milliGRAM(s) IV Intermittent every 48 hours  darbepoetin Injectable Syringe 40 MICROGram(s) IV Push <User Schedule>  influenza   Vaccine 0.5 milliLiter(s) IntraMuscular once  insulin glargine Injectable (LANTUS) 12 Unit(s) SubCutaneous at bedtime  insulin lispro (ADMELOG) corrective regimen sliding scale   SubCutaneous three times a day before meals  meropenem  IVPB 1000 milliGRAM(s) IV Intermittent every 24 hours  NIFEdipine XL 30 milliGRAM(s) Oral daily  polyethylene glycol 3350 17 Gram(s) Oral daily  senna 1 Tablet(s) Oral two times a day  sevelamer carbonate 800 milliGRAM(s) Oral three times a day  tamsulosin 0.4 milliGRAM(s) Oral at bedtime    MEDICATIONS  (PRN):  acetaminophen     Tablet .. 650 milliGRAM(s) Oral every 6 hours PRN Temp greater or equal to 38C (100.4F), Mild Pain (1 - 3)  aluminum hydroxide/magnesium hydroxide/simethicone Suspension 30 milliLiter(s) Oral every 4 hours PRN Dyspepsia  melatonin 3 milliGRAM(s) Oral at bedtime PRN Insomnia  ondansetron Injectable 4 milliGRAM(s) IV Push every 8 hours PRN Nausea and/or Vomiting    Vital Signs Last 24 Hrs  T(C): 36.1  T(F): 97  HR: 90  BP: 129/64  BP(mean): --  RR: 18  SpO2: --    O/E:  Awake, alert, not in distress.  HEENT: atraumatic, EOMI.  Chest: clear.  CVS: SIS2 +, no murmur.  P/A: Soft, BS+  CNS: awake, alert  Ext:  left foot ulcer, dressing+  Skin: no rash, no ulcers.  All systems reviewed positive findings as above    POCT Blood Glucose.: 112 mg/dL (31 Oct 2022 13:31)  POCT Blood Glucose.: 122 mg/dL (31 Oct 2022 08:00)  POCT Blood Glucose.: 196 mg/dL (30 Oct 2022 22:22)  POCT Blood Glucose.: 268 mg/dL (30 Oct 2022 16:37)                          7.2<L>  8.67  )-----------( 238      ( 31 Oct 2022 05:15 )             22.1<L>    10-31    133<L>  |  94<L>  |  61<HH>  ----------------------------<  106<H>  4.6   |  25  |  7.3<HH>    Ca    7.9<L>      31 Oct 2022 05:15  Mg     2.0     10-31            PT/INR - ( 31 Oct 2022 05:15 )   PT: 11.50 sec;   INR: 1.01 ratio

## 2022-10-31 NOTE — PHARMACOTHERAPY INTERVENTION NOTE - COMMENTS
Patient on meropenem 500mg IV q24h @0500 for L Calcaneous OM and ESBL+ E. coli bacteremia. Since patient is on HD, recommended changing timing of medication so it is given at 1800 each day, so it is given after HD on HD days. Recommended starting this tonight at 1800, as patient received HD today. 
Patient started on fluconazole 200mg PO daily for suspected Candida UTI. Since patient is on HD, recommended decreasing dose to 100mg PO daily to be given at 1800 starting tomorrow (patient already received 200mg dose today). 
Recommended to change daptomycin dose from 1000 mg IV q24h to 950 mg IV q48h since patient is on hemodialysis and will use adjusted body weight as dosing weight: (12 mg/kg x 78.5 kg).    Jeanette Tapia, PharmD, [facility]  Clinical Pharmacy Specialist, Infectious Diseases  Tele-Antimicrobial Stewardship Program (Tele-ASP)  Tele-ASP Phone: [phone]  
Recommended obtaining a baseline CK level since patient is being started on daptomycin. 
Recommended obtaining a repeat EKG, as patient was started on fluconazole today, and last EKG is from 10/15. 
As per policy, ordered a creatine kinase level for 11/2 AM as the patient requires monitoring creatine kinase levels weekly while receiving daptomycin.    Virgil Motta, PharmD  Clinical Pharmacy Specialist, Infectious Diseases  Tele-Antimicrobial Stewardship Program (Tele-ASP)  Tele-ASP Phone: [phone]

## 2022-10-31 NOTE — PROGRESS NOTE ADULT - ASSESSMENT
ASSESSMENT  63 y/o with a PMHx of DM, HTN, ESRD (on dialysis M/W/F, last session on 10/14), CAD s/p 1 stent on 2008 and quadruple CABG on 2012, PAD s/p bilateral angioplasty at [facility], was transferred from [facility] complaining of weakness and unhealed wound in the left foot, found to have OM of calcaneus bone and ESBL E coli bacteremia.    IMPRESSION  #Left Calcaneous OM  - s/p removal of FB 9/16  - Wound Cx 9/16 - Enterobacter cloacae  - treated with two weeks post-HD vancomycin + cefepime  - wound CX 10/16 VRE Faecim, Enterobacter cloacae complex, Proteus vulgaris, Morganella morganii   - s/p debridement 10/21 Wound Cx Proteus mirabilis, VRE faecium, Pseudomonas putida  - s/p debridement 10/24    #PAD  - s/p angiogram LLE with peroneal artery angioplasty and artherectom 10/27    #Dysuria   - Urine Cx 10/25 NG -- collected after fluconazole     #ESBL E coli Bacteremia at [facility]     #ESRD on HD  #CAD s/p CABG  #PAD s/p bilateral angioplasty  #DM  #Abx allergy:      RECOMMENDATIONS  - continue meropenem 500 mg q 24 hours   - continue daptomycin 12 mg/kg q 48 hours to cover VRE faecium   -- Creatine Kinase, Serum: 15 U/L (10.26.22 @ 06:53) -- check CK weekly  - surgery rescheduled -- planned for debridement 11/2  - will need prolonged course after final debridement for calcaneal OM       Please call or message on Microsoft Teams if with any questions.  Spectra [phone]

## 2022-10-31 NOTE — PROGRESS NOTE ADULT - ASSESSMENT
63 y/o with a PMHx of DM, HTN, ESRD (on dialysis M/W/F, last session on 10/14), CAD s/p 1 stent on 2008 and quadruple CABG on 2012, PAD s/p bilateral angioplasty at [facility], was transferred from [facility] complaining of weakness and unhealed wound in the left foot.    # Left foot ulcer  # Left calcaneous OM  # H/o ESBL E coli Bacteremia at [facility]   # PAD  - MR Lower Ext Joint w/ IV Cont, Left (10.17.22 @ 14:56) >Osteomyelitis and erosion of the calcaneus with a vertical pathologic fracture extending to the subtalar joint. Gangrene of overlying subcutaneous tissues. 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  - s/p removal of FB 9/16  - S/p Left lower extremity angiogram demonstrating chronically occluded PT and AT with peroneal artery angioplasty and artherectomy 10/27  - wound CX 10/16 VRE Faecim, Enterobacter cloacae complex, Proteus vulgaris, Morganella morganii   - s/p debridement 10/21-->  Wound Cx Proteus mirabilis, VRE faecium, Pseudomonas putida  - s/p debridement 10/24  - c/w  meropenem 500 mg q 24 hours,  daptomycin 12 mg/kg q 48 hours to cover VRE faecium   - planned for debridement 11/3    # ESRD on HD  # Hyponatremia  # Anemia sec to ESRD  - c/w renagel  - on Aranesp 40 weekly  - HD as per renal.    # H/o  urinary  retention  # H/o BPH   -c/w flomax  - bladder scan    # CAD s/p stent and CABG   # Hypertension  - c/w ASA, statin ,  procardia  - plavix held    #DM type 2  - (10.15.22 @ 09:53) A1C with Estimated Average Glucose Result: 6.8  - monitor FS  - c/w lantus., lispro    # DVT prophylaxis    # Full code    #Pending: bladder scan, planned for debridement 11/3  # Discussed plan of care with patient  # Disposition: SNF when stable

## 2022-10-31 NOTE — PROGRESS NOTE ADULT - ASSESSMENT
61 y/o with a PMHx of DM, HTN, ESRD (on dialysis M/W/F, last session on 10/14), CAD s/p 1 stent on 2008 and quadruple CABG on 2012, PAD s/p bilateral angioplasty at [facility], was transferred from [email or web address] complaining of weakness and unhealed wound in the left foot.  ESRD - HD sp HD friday  next today standard bath uf 2 liters as tolerated     last Phos noted , cont binders  URINARY retention -  cc, BPH 89 cc (10/25/22)  -cont Flomax, repeat Bladder scan  HTN - better controlled with Nifedipine  Anemia - on Aranesp 40 weekly, on ABx, no Venofer/ resistant in view of infection   PVD -non-healing left foot ulcer s/p LLE angiogram with peroneal angioplasty and atherectomy 10/27/22  - vascular sx appreciated ? OR today   - on dapto/ sharlene  will follow

## 2022-10-31 NOTE — CHART NOTE - NSCHARTNOTEFT_GEN_A_CORE
Surgery today has been CANCELED and RESCHEDULED for Thursday 11/3/22 @ TBD  Please cancel NPO orders for today;  Please make patient NPO @ [place] 11/2/22  Please optimize lab values prior to OR  COAG and T&S studies prior to OR    [phone]  Podiatry

## 2022-10-31 NOTE — PROGRESS NOTE ADULT - SUBJECTIVE AND OBJECTIVE BOX
seen and examined  24 h events noted   no distress       PAST HISTORY  --------------------------------------------------------------------------------  No significant changes to PMH, PSH, FHx, SHx, unless otherwise noted    ALLERGIES & MEDICATIONS  --------------------------------------------------------------------------------  Allergies    No Known Allergies    Intolerances      Standing Inpatient Medications  aspirin enteric coated 81 milliGRAM(s) Oral daily  atorvastatin 40 milliGRAM(s) Oral at bedtime  Dakins Solution - 1/2 Strength 1 Application(s) Topical daily  DAPTOmycin IVPB 950 milliGRAM(s) IV Intermittent every 48 hours  darbepoetin Injectable Syringe 40 MICROGram(s) IV Push <User Schedule>  influenza   Vaccine 0.5 milliLiter(s) IntraMuscular once  insulin glargine Injectable (LANTUS) 12 Unit(s) SubCutaneous at bedtime  insulin lispro (ADMELOG) corrective regimen sliding scale   SubCutaneous three times a day before meals  meropenem  IVPB 1000 milliGRAM(s) IV Intermittent every 24 hours  NIFEdipine XL 30 milliGRAM(s) Oral daily  polyethylene glycol 3350 17 Gram(s) Oral daily  senna 1 Tablet(s) Oral two times a day  sevelamer carbonate 800 milliGRAM(s) Oral three times a day  tamsulosin 0.4 milliGRAM(s) Oral at bedtime    PRN Inpatient Medications  acetaminophen     Tablet .. 650 milliGRAM(s) Oral every 6 hours PRN  aluminum hydroxide/magnesium hydroxide/simethicone Suspension 30 milliLiter(s) Oral every 4 hours PRN  melatonin 3 milliGRAM(s) Oral at bedtime PRN  ondansetron Injectable 4 milliGRAM(s) IV Push every 8 hours PRN        VITALS/PHYSICAL EXAM  --------------------------------------------------------------------------------  T(C): 36.1 (10-31-22 @ 05:10), Max: 36.2 (10-30-22 @ 12:57)  HR: 99 (10-31-22 @ 05:10) (70 - 99)  BP: 146/75 (10-31-22 @ 05:10) (108/52 - 146/75)  RR: 18 (10-31-22 @ 05:10) (18 - 18)  SpO2: 98% (10-30-22 @ 12:57) (98% - 98%)  Wt(kg): --  Height (cm): 175.3 (10-31-22 @ 05:06)  Weight (kg): 90.1 (10-31-22 @ 05:06)  BMI (kg/m2): 29.3 (10-31-22 @ 05:06)  BSA (m2): 2.06 (10-31-22 @ 05:06)      10-30-22 @ 07:01  -  10-31-22 @ 07:00  --------------------------------------------------------  IN: 0 mL / OUT: 400 mL / NET: -400 mL      Physical Exam:  	Gen: NAD,   	Pulm: CTA B/L  	CV: S1S2; no rub  	Abd:  soft, nontender/nondistended  	Vascular access:av    LABS/STUDIES  --------------------------------------------------------------------------------              7.2    8.67  >-----------<  238      [10-31-22 @ 05:15]              22.1     133  |  94  |  61  ----------------------------<  106      [10-31-22 @ 05:15]  4.6   |  25  |  7.3        Ca     7.9     [10-31-22 @ 05:15]      Mg     2.0     [10-31-22 @ 05:15]      PT/INR: PT 11.50, INR 1.01       [10-31-22 @ 05:15]    Creatinine Trend:  SCr 7.3 [10-31 @ 05:15]  SCr 5.8 [10-28 @ 07:18]  SCr 4.3 [10-26 @ 23:31]  SCr 5.7 [10-26 @ 06:53]  SCr 4.3 [10-25 @ 07:24]    Urinalysis - [10-23-22 @ 18:53]      Color Yellow / Appearance Slightly Turbid / SG 1.011 / pH 8.5      Gluc 200 mg/dL / Ketone Negative  / Bili Negative / Urobili <2 mg/dL       Blood Trace / Protein 100 mg/dL / Leuk Est Small / Nitrite Negative      RBC 6 / WBC 13 / Hyaline 2 / Gran  / Sq Epi  / Non Sq Epi 9 / Bacteria Negative      Iron 39, TIBC 133, %sat 29      [10-21-22 @ 10:30]  Ferritin 1126      [10-21-22 @ 10:30]  PTH -- (Ca 7.5)      [02-26-22 @ 16:00]   312  Vitamin D (25OH) 21      [02-26-22 @ 16:00]  HbA1c 5.8      [01-30-20 @ 06:46]  Lipid: chol 81, , HDL 22, LDL --      [10-15-22 @ 09:53]    HBsAg Nonreact      [10-27-22 @ 04:09]  HCV 0.14, Nonreact      [10-27-22 @ 04:09]

## 2022-11-01 LAB
ALBUMIN SERPL ELPH-MCNC: 2.5 G/DL — LOW (ref 3.5–5.2)
ALP SERPL-CCNC: 146 U/L — HIGH (ref 30–115)
ALT FLD-CCNC: <5 U/L — SIGNIFICANT CHANGE UP (ref 0–41)
ANION GAP SERPL CALC-SCNC: 11 MMOL/L — SIGNIFICANT CHANGE UP (ref 7–14)
AST SERPL-CCNC: 10 U/L — SIGNIFICANT CHANGE UP (ref 0–41)
BILIRUB SERPL-MCNC: 0.3 MG/DL — SIGNIFICANT CHANGE UP (ref 0.2–1.2)
BLD GP AB SCN SERPL QL: SIGNIFICANT CHANGE UP
BUN SERPL-MCNC: 38 MG/DL — HIGH (ref 10–20)
CALCIUM SERPL-MCNC: 8.4 MG/DL — SIGNIFICANT CHANGE UP (ref 8.4–10.4)
CHLORIDE SERPL-SCNC: 100 MMOL/L — SIGNIFICANT CHANGE UP (ref 98–110)
CO2 SERPL-SCNC: 27 MMOL/L — SIGNIFICANT CHANGE UP (ref 17–32)
CREAT SERPL-MCNC: 5.3 MG/DL — CRITICAL HIGH (ref 0.7–1.5)
EGFR: 11 ML/MIN/1.73M2 — LOW
GLUCOSE BLDC GLUCOMTR-MCNC: 140 MG/DL — HIGH (ref 70–99)
GLUCOSE BLDC GLUCOMTR-MCNC: 187 MG/DL — HIGH (ref 70–99)
GLUCOSE BLDC GLUCOMTR-MCNC: 246 MG/DL — HIGH (ref 70–99)
GLUCOSE BLDC GLUCOMTR-MCNC: 86 MG/DL — SIGNIFICANT CHANGE UP (ref 70–99)
GLUCOSE SERPL-MCNC: 64 MG/DL — LOW (ref 70–99)
HCT VFR BLD CALC: 22.5 % — LOW (ref 42–52)
HGB BLD-MCNC: 7.4 G/DL — LOW (ref 14–18)
MAGNESIUM SERPL-MCNC: 1.9 MG/DL — SIGNIFICANT CHANGE UP (ref 1.8–2.4)
MCHC RBC-ENTMCNC: 31.9 PG — HIGH (ref 27–31)
MCHC RBC-ENTMCNC: 32.9 G/DL — SIGNIFICANT CHANGE UP (ref 32–37)
MCV RBC AUTO: 97 FL — HIGH (ref 80–94)
NRBC # BLD: 0 /100 WBCS — SIGNIFICANT CHANGE UP (ref 0–0)
PLATELET # BLD AUTO: 239 K/UL — SIGNIFICANT CHANGE UP (ref 130–400)
POTASSIUM SERPL-MCNC: 4.2 MMOL/L — SIGNIFICANT CHANGE UP (ref 3.5–5)
POTASSIUM SERPL-SCNC: 4.2 MMOL/L — SIGNIFICANT CHANGE UP (ref 3.5–5)
PROT SERPL-MCNC: 6.7 G/DL — SIGNIFICANT CHANGE UP (ref 6–8)
RBC # BLD: 2.32 M/UL — LOW (ref 4.7–6.1)
RBC # FLD: 14.4 % — SIGNIFICANT CHANGE UP (ref 11.5–14.5)
SODIUM SERPL-SCNC: 138 MMOL/L — SIGNIFICANT CHANGE UP (ref 135–146)
WBC # BLD: 8.16 K/UL — SIGNIFICANT CHANGE UP (ref 4.8–10.8)
WBC # FLD AUTO: 8.16 K/UL — SIGNIFICANT CHANGE UP (ref 4.8–10.8)

## 2022-11-01 PROCEDURE — 99233 SBSQ HOSP IP/OBS HIGH 50: CPT

## 2022-11-01 RX ORDER — POLYETHYLENE GLYCOL 3350 17 G/17G
17 POWDER, FOR SOLUTION ORAL
Refills: 0 | Status: ACTIVE | OUTPATIENT
Start: 2022-11-01 | End: 2023-09-30

## 2022-11-01 RX ORDER — SENNA PLUS 8.6 MG/1
2 TABLET ORAL AT BEDTIME
Refills: 0 | Status: ACTIVE | OUTPATIENT
Start: 2022-11-01 | End: 2023-09-30

## 2022-11-01 RX ADMIN — SENNA PLUS 1 TABLET(S): 8.6 TABLET ORAL at 06:37

## 2022-11-01 RX ADMIN — INSULIN GLARGINE 12 UNIT(S): 100 INJECTION, SOLUTION SUBCUTANEOUS at 21:48

## 2022-11-01 RX ADMIN — Medication 5 MILLIGRAM(S): at 11:45

## 2022-11-01 RX ADMIN — SEVELAMER CARBONATE 800 MILLIGRAM(S): 2400 POWDER, FOR SUSPENSION ORAL at 06:37

## 2022-11-01 RX ADMIN — Medication 30 MILLIGRAM(S): at 06:36

## 2022-11-01 RX ADMIN — Medication 1 APPLICATION(S): at 11:40

## 2022-11-01 RX ADMIN — HEPARIN SODIUM 5000 UNIT(S): 5000 INJECTION INTRAVENOUS; SUBCUTANEOUS at 06:37

## 2022-11-01 RX ADMIN — MEROPENEM 100 MILLIGRAM(S): 1 INJECTION INTRAVENOUS at 21:47

## 2022-11-01 RX ADMIN — Medication 4: at 17:06

## 2022-11-01 RX ADMIN — DAPTOMYCIN 138 MILLIGRAM(S): 500 INJECTION, POWDER, LYOPHILIZED, FOR SOLUTION INTRAVENOUS at 21:47

## 2022-11-01 RX ADMIN — POLYETHYLENE GLYCOL 3350 17 GRAM(S): 17 POWDER, FOR SOLUTION ORAL at 17:07

## 2022-11-01 RX ADMIN — Medication 81 MILLIGRAM(S): at 11:40

## 2022-11-01 RX ADMIN — ATORVASTATIN CALCIUM 40 MILLIGRAM(S): 80 TABLET, FILM COATED ORAL at 21:48

## 2022-11-01 RX ADMIN — SENNA PLUS 2 TABLET(S): 8.6 TABLET ORAL at 21:48

## 2022-11-01 RX ADMIN — TAMSULOSIN HYDROCHLORIDE 0.4 MILLIGRAM(S): 0.4 CAPSULE ORAL at 21:48

## 2022-11-01 RX ADMIN — HEPARIN SODIUM 5000 UNIT(S): 5000 INJECTION INTRAVENOUS; SUBCUTANEOUS at 17:06

## 2022-11-01 RX ADMIN — SEVELAMER CARBONATE 800 MILLIGRAM(S): 2400 POWDER, FOR SUSPENSION ORAL at 17:06

## 2022-11-01 RX ADMIN — SEVELAMER CARBONATE 800 MILLIGRAM(S): 2400 POWDER, FOR SUSPENSION ORAL at 21:48

## 2022-11-01 NOTE — PROGRESS NOTE ADULT - ASSESSMENT
63 y/o with a PMHx of DM, HTN, ESRD (on dialysis M/W/F, last session on 10/14), CAD s/p 1 stent on 2008 and quadruple CABG on 2012, PAD s/p bilateral angioplasty at [facility], was transferred from [facility] complaining of weakness and unhealed wound in the left foot.  ESRD - HD yesterday    last Phos noted , cont binders  URINARY retention -  cc, BPH 89 cc (10/25/22)  -cont Flomax, repeat Bladder scan  HTN - better controlled with Nifedipine  Anemia - on Aranesp 40 weekly, on ABx, no Venofer/ resistant in view of infection   PVD -non-healing left foot ulcer s/p LLE angiogram with peroneal angioplasty and atherectomy 10/27/22    - on dapto/ sharlene  will follow

## 2022-11-01 NOTE — PROGRESS NOTE ADULT - SUBJECTIVE AND OBJECTIVE BOX
Nephrology progress note    Patient was seen and examined, events over the last 24 h noted .    HD yesterday    Allergies:  No Known Allergies    Hospital Medications:   MEDICATIONS  (STANDING):  aspirin enteric coated 81 milliGRAM(s) Oral daily  atorvastatin 40 milliGRAM(s) Oral at bedtime  Dakins Solution - 1/2 Strength 1 Application(s) Topical daily  DAPTOmycin IVPB 950 milliGRAM(s) IV Intermittent every 48 hours  darbepoetin Injectable Syringe 40 MICROGram(s) IV Push <User Schedule>  heparin   Injectable 5000 Unit(s) SubCutaneous every 12 hours  influenza   Vaccine 0.5 milliLiter(s) IntraMuscular once  insulin glargine Injectable (LANTUS) 12 Unit(s) SubCutaneous at bedtime  insulin lispro (ADMELOG) corrective regimen sliding scale   SubCutaneous three times a day before meals  meropenem  IVPB 1000 milliGRAM(s) IV Intermittent every 24 hours  NIFEdipine XL 30 milliGRAM(s) Oral daily  polyethylene glycol 3350 17 Gram(s) Oral two times a day  senna 2 Tablet(s) Oral at bedtime  sevelamer carbonate 800 milliGRAM(s) Oral three times a day  tamsulosin 0.4 milliGRAM(s) Oral at bedtime        VITALS:  T(F): 97 (11-01-22 @ 12:43), Max: 97 (11-01-22 @ 12:43)  HR: 102 (11-01-22 @ 12:43)  BP: 146/74 (11-01-22 @ 12:43)  RR: 18 (11-01-22 @ 12:43)  SpO2: 98% (11-01-22 @ 12:43)  Wt(kg): --    10-30 @ 07:01  -  10-31 @ 07:00  --------------------------------------------------------  IN: 0 mL / OUT: 400 mL / NET: -400 mL    10-31 @ 07:01  -  11-01 @ 07:00  --------------------------------------------------------  IN: 750 mL / OUT: 2450 mL / NET: -1700 mL    11-01 @ 07:01  -  11-01 @ 22:18  --------------------------------------------------------  IN: 0 mL / OUT: 300 mL / NET: -300 mL          PHYSICAL EXAM:  Constitutional: NAD  HEENT: anicteric sclera, oropharynx clear, MMM  Neck: No JVD  Respiratory: CTAB, no wheezes, rales or rhonchi  Cardiovascular: S1, S2, RRR  Gastrointestinal: BS+, soft, NT/ND  Extremities: No cyanosis or clubbing. No peripheral edema  :  No schneider.   Skin: No rashes    LABS:  11-01    138  |  100  |  38<H>  ----------------------------<  64<L>  4.2   |  27  |  5.3<HH>    Ca    8.4      01 Nov 2022 05:54  Mg     1.9     11-01    TPro  6.7  /  Alb  2.5<L>  /  TBili  0.3  /  DBili      /  AST  10  /  ALT  <5  /  AlkPhos  146<H>  11-01                          7.4    8.16  )-----------( 239      ( 01 Nov 2022 05:54 )             22.5       Urine Studies:      RADIOLOGY & ADDITIONAL STUDIES:

## 2022-11-01 NOTE — PROGRESS NOTE ADULT - SUBJECTIVE AND OBJECTIVE BOX
LAWRENCE SCHWABACHER 62y Male  MRN#: [id number]  Hospital Day: 18d    Pt is currently admitted with the primary diagnosis of sepsis POA secondary to diabetic foot ulcer    HPI: 63 y/o with a PMHx of DM, HTN, ESRD (on dialysis M/W/F, last session on 10/14), CAD s/p 1 stent on 2008 and quadruple CABG on 2012, PAD s/p bilateral angioplasty at [facility], was transferred from [facility] complaining of weakness and unhealed wound in the left foot.    SUBJECTIVE  Overnight events: None  Subjective complaints: None. Patient denies fever, chills, n/v/d, chest pain, SOB.    Debridement with podiatry rescheduled for 11/3.    Present Today:   - [place]acey:  No [ x ], Yes [   ] : Indication:     - Type of IV Access:       .. CVC/Piccline:  No [ x ], Yes [   ] : Indication:       .. Midline: No [ x ], Yes [   ] : Indication:                                             ----------------------------------------------------------  OBJECTIVE    VITAL SIGNS: Last 24 Hours  T(C): 36.1 (31 Oct 2022 21:00), Max: 36.1 (31 Oct 2022 21:00)  T(F): 97 (31 Oct 2022 21:00), Max: 97 (31 Oct 2022 21:00)  HR: 94 (31 Oct 2022 21:00) (94 - 94)  BP: 148/75 (31 Oct 2022 21:00) (148/75 - 148/75)  BP(mean): --  RR: 18 (31 Oct 2022 21:00) (18 - 18)  SpO2: 98% (31 Oct 2022 21:00) (98% - 98%)      10-31-22 @ 07:01  -  11-01-22 @ 07:00  --------------------------------------------------------  IN: 750 mL / OUT: 2450 mL / NET: -1700 mL    PHYSICAL EXAM:  General: Laying in bed. In NAD.  HEENT: Normocephalic, nontraumatic. PERRL.  LUNGS: Clear to auscultation b/l. No wheezes, rales, or rhonchi.  HEART: RRR. No murmurs, rubs, or gallops.  ABDOMEN: Soft, nontender, nondistended. Normoactive bowel sounds.  EXT: Pulses 2+ UE, diminished in RLE, unable to check LLE. No lower extremity edema. Left foot with dressing, intact and clean.  NEURO: AAOx4.  SKIN: Warm, dry.    PAST MEDICAL & SURGICAL HISTORY  CAD (coronary artery disease) 1 stent 2008  S/P CABG (coronary artery bypass graft) x4  Diabetes mellitus  Transient ischemic attack (TIA) 2017; 2008  Chronic kidney disease (CKD) Stage IV  Hypertension  Stented coronary artery in 2008  Myocardial infarction 2012  PAD (peripheral artery disease)  S/p bypass left leg  HLD (hyperlipidemia)  BPH (benign prostatic hyperplasia)  Pain in left knee s/p fall  OA (osteoarthritis)  [place] (hard of hearing)  Chronic anemia  S/P CABG (coronary artery bypass graft) 2012  H/O arterial bypass of lower limb Left Lower Extremity (2016)    History of surgery  Left CEA (2017)  Left Pinkie toe Amputation (2014)  CABG x 4 (2012)  Card cath - stent (2008)  AV fistula 2019  LEFT AV FISTULA                                            -----------------------------------------------------------  ALLERGIES:  No Known Allergies                                            ------------------------------------------------------------    HOME MEDICATIONS  Home Medications:  aspirin 81 mg oral tablet: 1 tab(s) orally once a day (14 Sep 2022 17:42)  furosemide 40 mg oral tablet: 2 tab(s) orally once a day (14 Sep 2022 17:42)  Levemir 100 units/mL subcutaneous solution: 40 unit(s) subcutaneous once a day (at bedtime) (14 Sep 2022 17:42)  Metoprolol Tartrate 50 mg oral tablet: 1 tab(s) orally 2 times a day (14 Sep 2022 17:42)  Plavix 75 mg oral tablet: 1 tab(s) orally once a day (14 Sep 2022 17:42)  sevelamer carbonate 800 mg oral tablet: 1 tab(s) orally 3 times a day (with meals) (20 Sep 2022 11:36)  Trulicity Pen 1.5 mg/0.5 mL subcutaneous solution: 1.5  subcutaneous once a week (14 Sep 2022 17:42)                           MEDICATIONS:  STANDING MEDICATIONS  aspirin enteric coated 81 milliGRAM(s) Oral daily  atorvastatin 40 milliGRAM(s) Oral at bedtime  bisacodyl 5 milliGRAM(s) Oral once  Dakins Solution - 1/2 Strength 1 Application(s) Topical daily  DAPTOmycin IVPB 950 milliGRAM(s) IV Intermittent every 48 hours  darbepoetin Injectable Syringe 40 MICROGram(s) IV Push <User Schedule>  heparin   Injectable 5000 Unit(s) SubCutaneous every 12 hours  influenza   Vaccine 0.5 milliLiter(s) IntraMuscular once  insulin glargine Injectable (LANTUS) 12 Unit(s) SubCutaneous at bedtime  insulin lispro (ADMELOG) corrective regimen sliding scale   SubCutaneous three times a day before meals  meropenem  IVPB 1000 milliGRAM(s) IV Intermittent every 24 hours  NIFEdipine XL 30 milliGRAM(s) Oral daily  polyethylene glycol 3350 17 Gram(s) Oral two times a day  senna 2 Tablet(s) Oral at bedtime  sevelamer carbonate 800 milliGRAM(s) Oral three times a day  tamsulosin 0.4 milliGRAM(s) Oral at bedtime    PRN MEDICATIONS  acetaminophen     Tablet .. 650 milliGRAM(s) Oral every 6 hours PRN  aluminum hydroxide/magnesium hydroxide/simethicone Suspension 30 milliLiter(s) Oral every 4 hours PRN  melatonin 3 milliGRAM(s) Oral at bedtime PRN  ondansetron Injectable 4 milliGRAM(s) IV Push every 8 hours PRN                                           --------------------------------------------------------------  LABS:                        7.4    8.16  )-----------( 239      ( 01 Nov 2022 05:54 )             22.5     11-01    138  |  100  |  38<H>  ----------------------------<  64<L>  4.2   |  27  |  5.3<HH>    Ca    8.4      01 Nov 2022 05:54  Mg     1.9     11-01    TPro  6.7  /  Alb  2.5<L>  /  TBili  0.3  /  DBili  x   /  AST  10  /  ALT  <5  /  AlkPhos  146<H>  11-01    PT/INR - ( 31 Oct 2022 05:15 )   PT: 11.50 sec;   INR: 1.01 ratio      CAPILLARY BLOOD GLUCOSE  POCT Blood Glucose.: 140 mg/dL (01 Nov 2022 11:30)  POCT Blood Glucose.: 86 mg/dL (01 Nov 2022 07:35)  POCT Blood Glucose.: 235 mg/dL (31 Oct 2022 21:12)  POCT Blood Glucose.: 121 mg/dL (31 Oct 2022 16:32)  POCT Blood Glucose.: 112 mg/dL (31 Oct 2022 13:31)                                            -------------------------------------------------------------  RADIOLOGY:    < from: [facility] Duplex Lower Ext Vein Scan, Bilat (10.26.22 @ 07:52) >  Impression: Bilateral lower extremity vein mapping with the above measurements.      < from: US Kidney and Bladder (10.25.22 @ 09:06) >  IMPRESSION: No hydronephrosis or calculi. Enlarged prostate. Prevoid volume of approximately 684 mL.      < from: MR Lower Ext Joint w/ IV Cont, Left (10.17.22 @ 14:56) >  IMPRESSION:  Osteomyelitis and erosion of the calcaneus with a vertical pathologic fracture extending to the subtalar joint. Gangrene of overlying subcutaneous tissues. 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                                            --------------------------------------------------------------

## 2022-11-01 NOTE — PROGRESS NOTE ADULT - ASSESSMENT
63 y/o with a PMHx of DM, HTN, ESRD (on dialysis M/W/F, last session on 10/14), CAD s/p 1 stent on 2008 and quadruple CABG on 2012, PAD s/p bilateral angioplasty at [facility], was transferred from [facility], [place] complaining of weakness and unhealed wound in the left foot.    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 hx of PAD   - IR for angiogram 10/20/22: a) Left lower extremity angiogram demonstrating chronically occluded PT and AT with patent anterior tibial vein bypass reconstituting into the DP. b) No microvascular outflow demonstrates within the calcaneous. No hemodynamically significant stenosis within the left SFA and popliteal artery. No intervention performed  - Blood cx 10/15:NTD   - Wound Cx 9/16 - Enterobacter cloacae  - treated with two weeks post-HD vancomycin + cefepime  - wound CX 10/16 VRE Faecim, Enterobacter cloacae complex, Proteus vulgaris, Morganella morganii   - s/p debridement 10/21 Wound Cx Proteus mirabilis, VRE faecium, Pseudomonas putida  - MRI L foot 10/17: a) Osteomyelitis and erosion of the calcaneus with a vertical pathologic fracture extending to the subtalar joint. Gangrene of overlying subcutaneous tissues. 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  - ID consult appreciated - will likely need at least 6 weeks from last debridement for calcaneal OM  - Cont meropenem 500mg IV daily   - C/w daptomycin 12 mg/kg q 48 hours to cover VRE faecium   - Creatine Kinase, Serum: 15 U/L (10.26.22 @ 06:53) -- check CK weekly 2/ daptomycin use (next check 11/2)  - Podiatry consult:  a) Local Wound Care; Wound Flushed w/ NS; Wound Packed w/ xeroform/ ABD x2 / DSD / Kerlix / Ace bandage  b) Weight Bearing Status; WBAT w/ Heel Touch w/ Surgical Shoe;   c) Continue w/ Local Wound Care; Q24 Dressing Changes;  - S/p Initial debridement 10/21, S/p 10/24/22 excisional debridement of soft tissue and bone of left foot; (pt given 1u pRBCs pre-op)  - S/p 10/27 left lower extremity angiogram, peroneal artery angioplasty and atherectomy 2/ occluded segment found on previous angiogram with Dr. Malik  - Cardio consult 10/26 appreciated patient at high risk for surgery  - Podiatry Plan for 3rd surgical debridement with wound VAC application after vascular procedure - scheduled for 11/3/22    #Dysuria - r/o UTI - improving   #H/o BPH  - Pt already receiving meropenem - should cover most UTI causing bacteria  - UA 10/23 WBC 13, nitrite negative, LEC small, bacteria negative   - 10/25 urine cx (-)  - 10/25 Kidney & Bladder sonogram (-) for hydronephrosis   -  cc, BPH 89 cc (10/25/22)  - DC fluconazole (10/25-10/28) in case of fungal origin 5 d. course per ID - stopped 10/28 2/ QTC   - EKG 10/25: . --> 10/27 . --> 10/28   - Per nephro d/c phenazopyridine in HD patient  - Started Flomax 0.4mg PO q24h  - Repeat bladder scan    #HTN  - C/w nifedipine 30mg PO q24h    # ESRD on HD  - HD as per nephrology   - C/w darbepoetin  - C/w Sevelamer 800mg TID    # CAD s/p stent and CABG   - Lipitor 40mg qHS  - Discuss with cardio. we're unable to hold aspirin. Cont aspirin   - holding plavix since 10/16  - Continue BB  - Cardio consult 10/19 appreciated at moderate risk for surgery     # DM II  - Follow FSG  - Lantus 12U qHS, +2 CF insulin sliding scale    # Constipation - resolved   - C/w Miralax, senna daily    #Other  Diet: DASH/TLC, Renal  Activity: Increase as tolerated, LLE toe touch weight bearing with surgical shoe  GI PPX: None  DVT PPX: Heparin 5000U subQ q12h  Dispo: From home, pending debridement revision   61 y/o with a PMHx of DM, HTN, ESRD (on dialysis M/W/F, last session on 10/14), CAD s/p 1 stent on 2008 and quadruple CABG on 2012, PAD s/p bilateral angioplasty at [facility], was transferred from [facility] complaining of weakness and unhealed wound in the left foot.    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 hx of PAD   - IR for angiogram 10/20/22: a) Left lower extremity angiogram demonstrating chronically occluded PT and AT with patent anterior tibial vein bypass reconstituting into the DP. b) No microvascular outflow demonstrates within the calcaneous. No hemodynamically significant stenosis within the left SFA and popliteal artery. No intervention performed  - Blood cx 10/15:NTD   - Wound Cx 9/16 - Enterobacter cloacae  - treated with two weeks post-HD vancomycin + cefepime  - wound CX 10/16 VRE Faecim, Enterobacter cloacae complex, Proteus vulgaris, Morganella morganii   - s/p debridement 10/21 Wound Cx Proteus mirabilis, VRE faecium, Pseudomonas putida  - MRI L foot 10/17: a) Osteomyelitis and erosion of the calcaneus with a vertical pathologic fracture extending to the subtalar joint. Gangrene of overlying subcutaneous tissues. 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  - ID consult appreciated - will likely need at least 6 weeks from last debridement for calcaneal OM  - Cont meropenem 500mg IV daily   - C/w daptomycin 12 mg/kg q 48 hours to cover VRE faecium   - Creatine Kinase, Serum: 15 U/L (10.26.22 @ 06:53) -- check CK weekly 2/ daptomycin use (next check 11/2)  - Podiatry consult:  a) Local Wound Care; Wound Flushed w/ NS; Wound Packed w/ xeroform/ ABD x2 / DSD / Kerlix / Ace bandage  b) Weight Bearing Status; WBAT w/ Heel Touch w/ Surgical Shoe;   c) Continue w/ Local Wound Care; Q24 Dressing Changes;  - S/p Initial debridement 10/21, S/p 10/24/22 excisional debridement of soft tissue and bone of left foot; (pt given 1u pRBCs pre-op)  - S/p 10/27 left lower extremity angiogram, peroneal artery angioplasty and atherectomy 2/ occluded segment found on previous angiogram with Dr. Malik  - Cardio consult 10/26 appreciated patient at high risk for surgery  - Podiatry Plan for 3rd surgical debridement with wound VAC application after vascular procedure - scheduled for 11/3/22    #Dysuria - r/o UTI - improving   #H/o BPH  - Pt already receiving meropenem - should cover most UTI causing bacteria  - UA 10/23 WBC 13, nitrite negative, LEC small, bacteria negative   - 10/25 urine cx (-)  - 10/25 Kidney & Bladder sonogram (-) for hydronephrosis   -  cc, BPH 89 cc (10/25/22)  - DC fluconazole (10/25-10/28) in case of fungal origin 5 d. course per ID - stopped 10/28 2/ QTC   - EKG 10/25: . --> 10/27 . --> 10/28   - Per nephro d/c phenazopyridine in HD patient  - Started Flomax 0.4mg PO q24h  - Repeat bladder scan (11/1): 17cc. Patient voided today.    #HTN  - C/w nifedipine 30mg PO q24h    # ESRD on HD  - HD as per nephrology   - C/w darbepoetin  - C/w Sevelamer 800mg TID    # CAD s/p stent and CABG   - Lipitor 40mg qHS  - Discuss with cardio. we're unable to hold aspirin. Cont aspirin   - holding plavix since 10/16  - Continue BB  - Cardio consult 10/19 appreciated at moderate risk for surgery     # DM II  - Follow FSG  - Lantus 12U qHS, +2 CF insulin sliding scale    # Constipation - resolved   - C/w Miralax, senna daily    #Other  Diet: DASH/TLC, Renal  Activity: Increase as tolerated, LLE toe touch weight bearing with surgical shoe  GI PPX: None  DVT PPX: Heparin 5000U subQ q12h  Dispo: From home, pending debridement revision

## 2022-11-01 NOTE — PROGRESS NOTE ADULT - SUBJECTIVE AND OBJECTIVE BOX
Patient is a 62y old  Male who presents with a chief complaint of Sepsis (31 Oct 2022 10:06)    Patient was seen and examined.  left foot debridement Surgery rescheduled for 11/3  no new complaints    PAST MEDICAL & SURGICAL HISTORY:  CAD (coronary artery disease), Myocardial infarction 2012, 1 stent 2008 S/P CABG (coronary artery bypass graft) x4  Diabetes mellitus  Transient ischemic attack (TIA)2017; 2008  Chronic kidney disease (CKD), Stage IV  Hypertension  PAD (peripheral artery disease) S/p bypass left leg  HLD (hyperlipidemia)  BPH (benign prostatic hyperplasia)  Pain in left knee s/p fall  OA (osteoarthritis)  [place] (hard of hearing)  Chronic anemia  H/O arterial bypass of lower limb Left Lower Extremity (2016)    History of surgery  Left CEA (2017)  Left Pinkie toe Amputation (2014)  CABG x 4 (2012)  Card cath - stent (2008)  AV fistula 2019  LEFT AV FISTULA    Allergies  No Known Allergies    MEDICATIONS  (STANDING):  aspirin enteric coated 81 milliGRAM(s) Oral daily  atorvastatin 40 milliGRAM(s) Oral at bedtime  Dakins Solution - 1/2 Strength 1 Application(s) Topical daily  DAPTOmycin IVPB 950 milliGRAM(s) IV Intermittent every 48 hours  darbepoetin Injectable Syringe 40 MICROGram(s) IV Push <User Schedule>  heparin   Injectable 5000 Unit(s) SubCutaneous every 12 hours  influenza   Vaccine 0.5 milliLiter(s) IntraMuscular once  insulin glargine Injectable (LANTUS) 12 Unit(s) SubCutaneous at bedtime  insulin lispro (ADMELOG) corrective regimen sliding scale   SubCutaneous three times a day before meals  meropenem  IVPB 1000 milliGRAM(s) IV Intermittent every 24 hours  NIFEdipine XL 30 milliGRAM(s) Oral daily  polyethylene glycol 3350 17 Gram(s) Oral two times a day  senna 2 Tablet(s) Oral at bedtime  sevelamer carbonate 800 milliGRAM(s) Oral three times a day  tamsulosin 0.4 milliGRAM(s) Oral at bedtime    MEDICATIONS  (PRN):  acetaminophen     Tablet .. 650 milliGRAM(s) Oral every 6 hours PRN Temp greater or equal to 38C (100.4F), Mild Pain (1 - 3)  aluminum hydroxide/magnesium hydroxide/simethicone Suspension 30 milliLiter(s) Oral every 4 hours PRN Dyspepsia  melatonin 3 milliGRAM(s) Oral at bedtime PRN Insomnia  ondansetron Injectable 4 milliGRAM(s) IV Push every 8 hours PRN Nausea and/or Vomiting    T(C): 36.1 (11-01-22 @ 12:43), Max: 36.1 (10-31-22 @ 21:00)  HR: 102 (11-01-22 @ 12:43) (94 - 102)  BP: 146/74 (11-01-22 @ 12:43) (146/74 - 148/75)  RR: 18 (11-01-22 @ 12:43) (18 - 18)  SpO2: 98% (11-01-22 @ 12:43) (98% - 98%)    O/E:  Awake, alert, not in distress.  HEENT: atraumatic, EOMI.  Chest: clear.  CVS: SIS2 +, no murmur.  P/A: Soft, BS+  CNS: awake, alert  Ext:  left foot ulcer, dressing+  Skin: no rash, no ulcers.  All systems reviewed positive findings as above                          7.4<L>  8.16  )-----------( 239      ( 01 Nov 2022 05:54 )             22.5<L>                        7.2<L>  8.67  )-----------( 238      ( 31 Oct 2022 05:15 )             22.1<L>  11-01    138  |  100  |  38<H>  ----------------------------<  64<L>  4.2   |  27  |  5.3<HH>  10-31    133<L>  |  94<L>  |  61<HH>  ----------------------------<  106<H>  4.6   |  25  |  7.3<HH>    Ca    8.4      01 Nov 2022 05:54  Ca    7.9<L>      31 Oct 2022 05:15  Mg     1.9     11-01    TPro  6.7  /  Alb  2.5<L>  /  TBili  0.3  /  DBili  x   /  AST  10  /  ALT  <5  /  AlkPhos  146<H>  11-01

## 2022-11-01 NOTE — PROGRESS NOTE ADULT - ASSESSMENT
61 y/o with a PMHx of DM, HTN, ESRD (on dialysis M/W/F, last session on 10/14), CAD s/p 1 stent on 2008 and quadruple CABG on 2012, PAD s/p bilateral angioplasty at [facility], was transferred from [facility] complaining of weakness and unhealed wound in the left foot.    # Left foot ulcer  # Left calcaneous OM  # H/o ESBL E coli Bacteremia at [facility]   # PAD  - MR Lower Ext Joint w/ IV Cont, Left (10.17.22 @ 14:56) >Osteomyelitis and erosion of the calcaneus with a vertical pathologic fracture extending to the subtalar joint. Gangrene of overlying subcutaneous tissues. 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  - s/p removal of FB 9/16  - S/p Left lower extremity angiogram demonstrating chronically occluded PT and AT with peroneal artery angioplasty and artherectomy 10/27  - wound CX 10/16 VRE Faecim, Enterobacter cloacae complex, Proteus vulgaris, Morganella morganii   - s/p debridement 10/21-->  Wound Cx Proteus mirabilis, VRE faecium, Pseudomonas putida  - s/p debridement 10/24  - c/w  meropenem 500 mg q 24 hours,  daptomycin 12 mg/kg q 48 hours to cover VRE faecium   - planned for debridement 11/3  - monitor CK weekly    # ESRD on HD  # Hyponatremia-resolved  # Anemia sec to ESRD  - c/w renagel  - on Aranesp 40 weekly  - HD as per renal.    # H/o  urinary  retention  # H/o BPH   -c/w flomax  - bladder scan    # CAD s/p stent and CABG   # Hypertension  - c/w ASA, statin ,  procardia  - plavix held    #DM type 2  - (10.15.22 @ 09:53) A1C with Estimated Average Glucose Result: 6.8  - monitor FS  - c/w lantus., lispro    # DVT prophylaxis    # Full code    # Pending: bladder scan, planned for debridement 11/3  # Discussed plan of care with patient. Pt upset that surgery is postponed by 4 days. would like to talk to podiatry team.  # Disposition: SNF when stable

## 2022-11-02 LAB
ALBUMIN SERPL ELPH-MCNC: 2.9 G/DL — LOW (ref 3.5–5.2)
ALP SERPL-CCNC: 179 U/L — HIGH (ref 30–115)
ALT FLD-CCNC: <5 U/L — SIGNIFICANT CHANGE UP (ref 0–41)
ANION GAP SERPL CALC-SCNC: 14 MMOL/L — SIGNIFICANT CHANGE UP (ref 7–14)
APTT BLD: 31.6 SEC — SIGNIFICANT CHANGE UP (ref 27–39.2)
AST SERPL-CCNC: 10 U/L — SIGNIFICANT CHANGE UP (ref 0–41)
BILIRUB DIRECT SERPL-MCNC: <0.2 MG/DL — SIGNIFICANT CHANGE UP (ref 0–0.3)
BILIRUB INDIRECT FLD-MCNC: >0.1 MG/DL — LOW (ref 0.2–1.2)
BILIRUB SERPL-MCNC: 0.3 MG/DL — SIGNIFICANT CHANGE UP (ref 0.2–1.2)
BLD GP AB SCN SERPL QL: SIGNIFICANT CHANGE UP
BUN SERPL-MCNC: 51 MG/DL — HIGH (ref 10–20)
CALCIUM SERPL-MCNC: 8.4 MG/DL — SIGNIFICANT CHANGE UP (ref 8.4–10.5)
CHLORIDE SERPL-SCNC: 101 MMOL/L — SIGNIFICANT CHANGE UP (ref 98–110)
CK SERPL-CCNC: 23 U/L — SIGNIFICANT CHANGE UP (ref 0–225)
CO2 SERPL-SCNC: 27 MMOL/L — SIGNIFICANT CHANGE UP (ref 17–32)
CREAT SERPL-MCNC: 6.4 MG/DL — CRITICAL HIGH (ref 0.7–1.5)
EGFR: 9 ML/MIN/1.73M2 — LOW
GLUCOSE BLDC GLUCOMTR-MCNC: 216 MG/DL — HIGH (ref 70–99)
GLUCOSE BLDC GLUCOMTR-MCNC: 217 MG/DL — HIGH (ref 70–99)
GLUCOSE BLDC GLUCOMTR-MCNC: 95 MG/DL — SIGNIFICANT CHANGE UP (ref 70–99)
GLUCOSE SERPL-MCNC: 108 MG/DL — HIGH (ref 70–99)
HCT VFR BLD CALC: 22.9 % — LOW (ref 42–52)
HGB BLD-MCNC: 7.6 G/DL — LOW (ref 14–18)
INR BLD: 1.06 RATIO — SIGNIFICANT CHANGE UP (ref 0.65–1.3)
MAGNESIUM SERPL-MCNC: 2.1 MG/DL — SIGNIFICANT CHANGE UP (ref 1.8–2.4)
MCHC RBC-ENTMCNC: 31.8 PG — HIGH (ref 27–31)
MCHC RBC-ENTMCNC: 33.2 G/DL — SIGNIFICANT CHANGE UP (ref 32–37)
MCV RBC AUTO: 95.8 FL — HIGH (ref 80–94)
NRBC # BLD: 0 /100 WBCS — SIGNIFICANT CHANGE UP (ref 0–0)
PLATELET # BLD AUTO: 260 K/UL — SIGNIFICANT CHANGE UP (ref 130–400)
POTASSIUM SERPL-MCNC: 5 MMOL/L — SIGNIFICANT CHANGE UP (ref 3.5–5)
POTASSIUM SERPL-SCNC: 5 MMOL/L — SIGNIFICANT CHANGE UP (ref 3.5–5)
PROT SERPL-MCNC: 7 G/DL — SIGNIFICANT CHANGE UP (ref 6–8)
PROTHROM AB SERPL-ACNC: 12.1 SEC — SIGNIFICANT CHANGE UP (ref 9.95–12.87)
RBC # BLD: 2.39 M/UL — LOW (ref 4.7–6.1)
RBC # FLD: 14.3 % — SIGNIFICANT CHANGE UP (ref 11.5–14.5)
SARS-COV-2 RNA SPEC QL NAA+PROBE: SIGNIFICANT CHANGE UP
SODIUM SERPL-SCNC: 142 MMOL/L — SIGNIFICANT CHANGE UP (ref 135–146)
WBC # BLD: 9.1 K/UL — SIGNIFICANT CHANGE UP (ref 4.8–10.8)
WBC # FLD AUTO: 9.1 K/UL — SIGNIFICANT CHANGE UP (ref 4.8–10.8)

## 2022-11-02 PROCEDURE — 99233 SBSQ HOSP IP/OBS HIGH 50: CPT

## 2022-11-02 RX ADMIN — INSULIN GLARGINE 12 UNIT(S): 100 INJECTION, SOLUTION SUBCUTANEOUS at 21:26

## 2022-11-02 RX ADMIN — SEVELAMER CARBONATE 800 MILLIGRAM(S): 2400 POWDER, FOR SUSPENSION ORAL at 05:32

## 2022-11-02 RX ADMIN — HEPARIN SODIUM 5000 UNIT(S): 5000 INJECTION INTRAVENOUS; SUBCUTANEOUS at 05:32

## 2022-11-02 RX ADMIN — ATORVASTATIN CALCIUM 40 MILLIGRAM(S): 80 TABLET, FILM COATED ORAL at 21:27

## 2022-11-02 RX ADMIN — TAMSULOSIN HYDROCHLORIDE 0.4 MILLIGRAM(S): 0.4 CAPSULE ORAL at 21:27

## 2022-11-02 RX ADMIN — Medication 81 MILLIGRAM(S): at 14:08

## 2022-11-02 RX ADMIN — HEPARIN SODIUM 5000 UNIT(S): 5000 INJECTION INTRAVENOUS; SUBCUTANEOUS at 17:06

## 2022-11-02 RX ADMIN — Medication 1 APPLICATION(S): at 14:10

## 2022-11-02 RX ADMIN — Medication 4: at 17:05

## 2022-11-02 RX ADMIN — MEROPENEM 100 MILLIGRAM(S): 1 INJECTION INTRAVENOUS at 21:28

## 2022-11-02 RX ADMIN — Medication 30 MILLIGRAM(S): at 05:32

## 2022-11-02 RX ADMIN — POLYETHYLENE GLYCOL 3350 17 GRAM(S): 17 POWDER, FOR SOLUTION ORAL at 17:06

## 2022-11-02 RX ADMIN — POLYETHYLENE GLYCOL 3350 17 GRAM(S): 17 POWDER, FOR SOLUTION ORAL at 05:37

## 2022-11-02 RX ADMIN — SENNA PLUS 2 TABLET(S): 8.6 TABLET ORAL at 21:27

## 2022-11-02 RX ADMIN — SEVELAMER CARBONATE 800 MILLIGRAM(S): 2400 POWDER, FOR SUSPENSION ORAL at 14:08

## 2022-11-02 NOTE — PROGRESS NOTE ADULT - SUBJECTIVE AND OBJECTIVE BOX
LAWRENCE SCHWABACHER 62y Male  MRN#: [id number]  Hospital Day: 19d    Pt is currently admitted with the primary diagnosis of sepsis POA secondary to diabetic foot ulcer    HPI: 63 y/o with a PMHx of DM, HTN, ESRD (on dialysis M/W/F, last session on 10/14), CAD s/p 1 stent on 2008 and quadruple CABG on 2012, PAD s/p bilateral angioplasty at [facility], was transferred from [facility] complaining of weakness and unhealed wound in the left foot.    SUBJECTIVE  Overnight events: None  Subjective complaints: None. Denies fever, chills, chest pain, shortness of breath, N/V/D.    HD today. Scheduled for debridement revision with Podiatry tomorrow.    Present Today:   - [place]:  No [ x ], Yes [   ] : Indication:     - Type of IV Access:       .. CVC/Piccline:  No [ x ], Yes [   ] : Indication:       .. Midline: No [ x ], Yes [   ] : Indication:                                             ----------------------------------------------------------  OBJECTIVE    VITAL SIGNS: Last 24 Hours  T(C): 36.5 (02 Nov 2022 05:28), Max: 36.7 (01 Nov 2022 21:02)  T(F): 97.7 (02 Nov 2022 05:28), Max: 98 (01 Nov 2022 21:02)  HR: 89 (02 Nov 2022 08:32) (83 - 102)  BP: 100/56 (02 Nov 2022 08:32) (100/56 - 171/77)  BP(mean): --  RR: 18 (02 Nov 2022 08:32) (18 - 18)  SpO2: 96% (01 Nov 2022 21:02) (96% - 98%)      11-01-22 @ 07:01  -  11-02-22 @ 07:00  --------------------------------------------------------  IN: 0 mL / OUT: 300 mL / NET: -300 mL    PHYSICAL EXAM:  General: Laying in bed. In NAD.  HEENT: Normocephalic, nontraumatic. PERRL.  LUNGS: Clear to auscultation b/l. No wheezes, rales, or rhonchi.  HEART: RRR. No murmurs, rubs, or gallops.  ABDOMEN: Soft, nontender, nondistended. Normoactive bowel sounds.  EXT: Pulses 2+ UE, diminished in RLE, unable to check LLE. No lower extremity edema. Left foot with dressing, intact and clean.  NEURO: AAOx4.  SKIN: Warm, dry.    PAST MEDICAL & SURGICAL HISTORY  CAD (coronary artery disease) 1 stent 2008  S/P CABG (coronary artery bypass graft) x4  Diabetes mellitus  Transient ischemic attack (TIA) 2017; 2008  Chronic kidney disease (CKD) Stage IV  Hypertension  Stented coronary artery in 2008  Myocardial infarction 2012  PAD (peripheral artery disease)  S/p bypass left leg  HLD (hyperlipidemia)  BPH (benign prostatic hyperplasia)  Pain in left knee s/p fall  OA (osteoarthritis)  [place] (hard of hearing)  Chronic anemia  S/P CABG (coronary artery bypass graft) 2012  H/O arterial bypass of lower limb Left Lower Extremity (2016)    History of surgery  Left CEA (2017)  Left Pinkie toe Amputation (2014)  CABG x 4 (2012)  Card cath - stent (2008)  AV fistula 2019  LEFT AV FISTULA                                            -----------------------------------------------------------  ALLERGIES:  No Known Allergies                                          ------------------------------------------------------------    HOME MEDICATIONS  Home Medications:  aspirin 81 mg oral tablet: 1 tab(s) orally once a day (14 Sep 2022 17:42)  furosemide 40 mg oral tablet: 2 tab(s) orally once a day (14 Sep 2022 17:42)  Levemir 100 units/mL subcutaneous solution: 40 unit(s) subcutaneous once a day (at bedtime) (14 Sep 2022 17:42)  Metoprolol Tartrate 50 mg oral tablet: 1 tab(s) orally 2 times a day (14 Sep 2022 17:42)  Plavix 75 mg oral tablet: 1 tab(s) orally once a day (14 Sep 2022 17:42)  sevelamer carbonate 800 mg oral tablet: 1 tab(s) orally 3 times a day (with meals) (20 Sep 2022 11:36)  Trulicity Pen 1.5 mg/0.5 mL subcutaneous solution: 1.5  subcutaneous once a week (14 Sep 2022 17:42)                           MEDICATIONS:  STANDING MEDICATIONS  aspirin enteric coated 81 milliGRAM(s) Oral daily  atorvastatin 40 milliGRAM(s) Oral at bedtime  Dakins Solution - 1/2 Strength 1 Application(s) Topical daily  DAPTOmycin IVPB 950 milliGRAM(s) IV Intermittent every 48 hours  darbepoetin Injectable Syringe 40 MICROGram(s) IV Push <User Schedule>  heparin   Injectable 5000 Unit(s) SubCutaneous every 12 hours  influenza   Vaccine 0.5 milliLiter(s) IntraMuscular once  insulin glargine Injectable (LANTUS) 12 Unit(s) SubCutaneous at bedtime  insulin lispro (ADMELOG) corrective regimen sliding scale   SubCutaneous three times a day before meals  meropenem  IVPB 1000 milliGRAM(s) IV Intermittent every 24 hours  NIFEdipine XL 30 milliGRAM(s) Oral daily  polyethylene glycol 3350 17 Gram(s) Oral two times a day  senna 2 Tablet(s) Oral at bedtime  sevelamer carbonate 800 milliGRAM(s) Oral three times a day  tamsulosin 0.4 milliGRAM(s) Oral at bedtime    PRN MEDICATIONS  acetaminophen     Tablet .. 650 milliGRAM(s) Oral every 6 hours PRN  aluminum hydroxide/magnesium hydroxide/simethicone Suspension 30 milliLiter(s) Oral every 4 hours PRN  melatonin 3 milliGRAM(s) Oral at bedtime PRN  ondansetron Injectable 4 milliGRAM(s) IV Push every 8 hours PRN                                           --------------------------------------------------------------  LABS:                        7.6    9.10  )-----------( 260      ( 02 Nov 2022 08:13 )             22.9     11-02    142  |  101  |  51<H>  ----------------------------<  108<H>  5.0   |  27  |  6.4<HH>    Ca    8.4      02 Nov 2022 08:13  Mg     2.1     11-02    TPro  7.0  /  Alb  2.9<L>  /  TBili  0.3  /  DBili  <0.2  /  AST  10  /  ALT  <5  /  AlkPhos  179<H>  11-02    PT/INR - ( 02 Nov 2022 08:13 )   PT: 12.10 sec;   INR: 1.06 ratio    PTT - ( 02 Nov 2022 08:13 )  PTT:31.6 sec    Creatine Kinase, Serum: 23 U/L (11-02-22 @ 08:13)    CARDIAC MARKERS ( 02 Nov 2022 08:13 )  x     / x     / 23 U/L / x     / x        CAPILLARY BLOOD GLUCOSE  POCT Blood Glucose.: 95 mg/dL (02 Nov 2022 07:30)  POCT Blood Glucose.: 187 mg/dL (01 Nov 2022 21:22)  POCT Blood Glucose.: 246 mg/dL (01 Nov 2022 16:26)  POCT Blood Glucose.: 140 mg/dL (01 Nov 2022 11:30)                                            -------------------------------------------------------------  RADIOLOGY:    < from: VA Duplex Lower Ext Vein Scan, Bilat (10.26.22 @ 07:52) >  Impression: Bilateral lower extremity vein mapping with the above measurements.      < from: US Kidney and Bladder (10.25.22 @ 09:06) >  IMPRESSION: No hydronephrosis or calculi. Enlarged prostate. Prevoid volume of approximately 684 mL.      < from: MR Lower Ext Joint w/ IV Cont, Left (10.17.22 @ 14:56) >  IMPRESSION:  Osteomyelitis and erosion of the calcaneus with a vertical pathologic fracture extending to the subtalar joint. Gangrene of overlying subcutaneous tissues. 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                                            --------------------------------------------------------------

## 2022-11-02 NOTE — PROGRESS NOTE ADULT - ASSESSMENT
63 y/o with a PMHx of DM, HTN, ESRD (on dialysis M/W/F, last session on 10/14), CAD s/p 1 stent on 2008 and quadruple CABG on 2012, PAD s/p bilateral angioplasty at [facility], was transferred from [facility] complaining of weakness and unhealed wound in the left foot.    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 hx of PAD   - IR for angiogram 10/20/22: a) Left lower extremity angiogram demonstrating chronically occluded PT and AT with patent anterior tibial vein bypass reconstituting into the DP. b) No microvascular outflow demonstrates within the calcaneous. No hemodynamically significant stenosis within the left SFA and popliteal artery. No intervention performed  - Blood cx 10/15:NTD   - Wound Cx 9/16 - Enterobacter cloacae  - treated with two weeks post-HD vancomycin + cefepime  - wound CX 10/16 VRE Faecim, Enterobacter cloacae complex, Proteus vulgaris, Morganella morganii   - s/p debridement 10/21 Wound Cx Proteus mirabilis, VRE faecium, Pseudomonas putida  - MRI L foot 10/17: a) Osteomyelitis and erosion of the calcaneus with a vertical pathologic fracture extending to the subtalar joint. Gangrene of overlying subcutaneous tissues. 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  - ID consult appreciated - will likely need at least 6 weeks from last debridement for calcaneal OM  - Cont meropenem 500mg IV daily   - C/w daptomycin 12 mg/kg q 48 hours to cover VRE faecium   - Creatine Kinase, Serum: 15 U/L (10.26.22 @ 06:53)  - Creatine Kinase, Serum: 23 U/L (11.1.22 @ 08:13) -- check CK weekly 2/2 daptomycin use (next check 11/9)  - Podiatry consult:  a) Local Wound Care; Wound Flushed w/ NS; Wound Packed w/ xeroform/ ABD x2 / DSD / Kerlix / Ace bandage  b) Weight Bearing Status; WBAT w/ Heel Touch w/ Surgical Shoe;   c) Continue w/ Local Wound Care; Q24 Dressing Changes;  - S/p Initial debridement 10/21, S/p 10/24/22 excisional debridement of soft tissue and bone of left foot; (pt given 1u pRBCs pre-op)  - S/p 10/27 left lower extremity angiogram, peroneal artery angioplasty and atherectomy 2/ occluded segment found on previous angiogram with Dr. Malik  - Cardio consult 10/26 appreciated patient at high risk for surgery  - Podiatry Plan for 3rd surgical debridement with wound VAC application after vascular procedure - scheduled for 11/3/22    #Dysuria - r/o UTI - improving   #H/o BPH  - Pt already receiving meropenem - should cover most UTI causing bacteria  - UA 10/23 WBC 13, nitrite negative, LEC small, bacteria negative   - 10/25 urine cx (-)  - 10/25 Kidney & Bladder sonogram (-) for hydronephrosis   -  cc, BPH 89 cc (10/25/22)  - DC fluconazole (10/25-10/28) in case of fungal origin 5 d. course per ID - stopped 10/28 2/ QTC   - EKG 10/25: . --> 10/27 . --> 10/28   - Per nephro d/c phenazopyridine in HD patient  - Started Flomax 0.4mg PO q24h  - Repeat bladder scan (11/1): 17cc.    #HTN  - C/w nifedipine 30mg PO q24h    # ESRD on HD  - HD as per nephrology   - C/w darbepoetin  - C/w Sevelamer 800mg TID    # CAD s/p stent and CABG   - Lipitor 40mg qHS  - Discuss with cardio. we're unable to hold aspirin. Cont aspirin   - holding plavix since 10/16  - Continue BB  - Cardio consult 10/19 appreciated at moderate risk for surgery     # DM II  - Follow FSG  - Lantus 12U qHS, +2 CF insulin sliding scale    # Constipation - resolved   - C/w Miralax, senna daily    #Other  Diet: DASH/TLC, Renal  Activity: Increase as tolerated, LLE toe touch weight bearing with surgical shoe  GI PPX: None  DVT PPX: Heparin 5000U subQ q12h  Dispo: From home, pending debridement revision

## 2022-11-02 NOTE — PHYSICAL THERAPY INITIAL EVALUATION ADULT - ADDITIONAL COMMENTS
Patient lives alone in house with 19 steps to enter. Was independent in ADL's and ambulation prior to hospitalization

## 2022-11-02 NOTE — PROGRESS NOTE ADULT - SUBJECTIVE AND OBJECTIVE BOX
SCHWABACHER, LAWRENCE  62y  [facility] F3-4B 009 B      Patient is a 62y old  Male who presents with a chief complaint of Sepsis (02 Nov 2022 10:23)      INTERVAL HPI/OVERNIGHT EVENTS:    no events overnight     REVIEW OF SYSTEMS:  CONSTITUTIONAL: No fever, weight loss, or fatigue  EYES: No eye pain, visual disturbances, or discharge  ENMT:  No difficulty hearing, tinnitus, vertigo; No sinus or throat pain  NECK: No pain or stiffness  BREASTS: No pain, masses, or nipple discharge  RESPIRATORY: No cough, wheezing, chills or hemoptysis; No shortness of breath  CARDIOVASCULAR: No chest pain, palpitations, dizziness, or leg swelling  GASTROINTESTINAL: No abdominal or epigastric pain. No nausea, vomiting, or hematemesis; No diarrhea or constipation. No melena or hematochezia.  GENITOURINARY: No dysuria, frequency, hematuria, or incontinence  NEUROLOGICAL: No headaches, memory loss, loss of strength, numbness, or tremors  SKIN: No itching, burning, rashes, or lesions   LYMPH NODES: No enlarged glands  ENDOCRINE: No heat or cold intolerance; No hair loss  MUSCULOSKELETAL: No joint pain or swelling; No muscle, back, or extremity pain  PSYCHIATRIC: No depression, anxiety, mood swings, or difficulty sleeping  HEME/LYMPH: No easy bruising, or bleeding gums  ALLERY AND IMMUNOLOGIC: No hives or eczema  FAMILY HISTORY:  Family history of heart disease (Father)    DM (diabetes mellitus) (Mother)    ESRD (end stage renal disease) on dialysis (Mother)      T(C): 36.1 (11-02-22 @ 13:55), Max: 36.7 (11-01-22 @ 21:02)  HR: 95 (11-02-22 @ 13:55) (83 - 98)  BP: 138/68 (11-02-22 @ 13:55) (100/56 - 171/77)  RR: 19 (11-02-22 @ 13:55) (18 - 19)  SpO2: 96% (11-01-22 @ 21:02) (96% - 96%)  Wt(kg): --Vital Signs Last 24 Hrs  T(C): 36.1 (02 Nov 2022 13:55), Max: 36.7 (01 Nov 2022 21:02)  T(F): 97 (02 Nov 2022 13:55), Max: 98 (01 Nov 2022 21:02)  HR: 95 (02 Nov 2022 13:55) (83 - 98)  BP: 138/68 (02 Nov 2022 13:55) (100/56 - 171/77)  BP(mean): --  RR: 19 (02 Nov 2022 13:55) (18 - 19)  SpO2: 96% (01 Nov 2022 21:02) (96% - 96%)    Parameters below as of 02 Nov 2022 11:30  Patient On (Oxygen Delivery Method): room air        PHYSICAL EXAM:  GENERAL: NAD, well-groomed, well-developed  HEAD:  Atraumatic, Normocephalic  EYES: EOMI, PERRLA, conjunctiva and sclera clear  ENMT: No tonsillar erythema, exudates, or enlargement; Moist mucous membranes, Good dentition, No lesions  NECK: Supple, No JVD, Normal thyroid  NERVOUS SYSTEM:  Alert & Oriented X3, Good concentration; Motor Strength 5/5 B/L upper and lower extremities; DTRs 2+ intact and symmetric  PULM: Clear to auscultation bilaterally  CARDIAC: Regular rate and rhythm; No murmurs, rubs, or gallops  GI: Soft, Nontender, Nondistended; Bowel sounds present  EXTREMITIES:Left foot wrapped   LYMPH: No lymphadenopathy noted  SKIN: No rashes or lesions    Consultant(s) Notes Reviewed:  [x ] YES  [ ] NO  Care Discussed with Consultants/Other Providers [ x] YES  [ ] NO    LABS:                            7.6    9.10  )-----------( 260      ( 02 Nov 2022 08:13 )             22.9   11-02    142  |  101  |  51<H>  ----------------------------<  108<H>  5.0   |  27  |  6.4<HH>    Ca    8.4      02 Nov 2022 08:13  Mg     2.1     11-02    TPro  7.0  /  Alb  2.9<L>  /  TBili  0.3  /  DBili  <0.2  /  AST  10  /  ALT  <5  /  AlkPhos  179<H>  11-02        - MR Lower Ext Joint w/ IV Cont, Left (10.17.22 @ 14:56) >Osteomyelitis and erosion of the calcaneus with a vertical pathologic fracture extending to the subtalar joint. Gangrene of overlying subcutaneous tissues. 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  - s/p removal of FB 9/16  - S/p Left lower extremity angiogram demonstrating chronically occluded PT and AT with peroneal artery angioplasty and artherectomy 10/27        acetaminophen     Tablet .. 650 milliGRAM(s) Oral every 6 hours PRN  aluminum hydroxide/magnesium hydroxide/simethicone Suspension 30 milliLiter(s) Oral every 4 hours PRN  aspirin enteric coated 81 milliGRAM(s) Oral daily  atorvastatin 40 milliGRAM(s) Oral at bedtime  Dakins Solution - 1/2 Strength 1 Application(s) Topical daily  DAPTOmycin IVPB 950 milliGRAM(s) IV Intermittent every 48 hours  darbepoetin Injectable Syringe 40 MICROGram(s) IV Push <User Schedule>  heparin   Injectable 5000 Unit(s) SubCutaneous every 12 hours  influenza   Vaccine 0.5 milliLiter(s) IntraMuscular once  insulin glargine Injectable (LANTUS) 12 Unit(s) SubCutaneous at bedtime  insulin lispro (ADMELOG) corrective regimen sliding scale   SubCutaneous three times a day before meals  melatonin 3 milliGRAM(s) Oral at bedtime PRN  meropenem  IVPB 1000 milliGRAM(s) IV Intermittent every 24 hours  NIFEdipine XL 30 milliGRAM(s) Oral daily  ondansetron Injectable 4 milliGRAM(s) IV Push every 8 hours PRN  polyethylene glycol 3350 17 Gram(s) Oral two times a day  senna 2 Tablet(s) Oral at bedtime  sevelamer carbonate 800 milliGRAM(s) Oral three times a day  tamsulosin 0.4 milliGRAM(s) Oral at bedtime      HEALTH ISSUES - PROBLEM Dx:          Case Discussed with House Staff      Spectra [phone]

## 2022-11-02 NOTE — PHYSICAL THERAPY INITIAL EVALUATION ADULT - GENERAL OBSERVATIONS, REHAB EVAL
Patient encountered sitting in chair. L foot wrapped in ace bandage. Agreed to participate in therapy.

## 2022-11-02 NOTE — PROGRESS NOTE ADULT - ASSESSMENT
63 y/o with a PMHx of DM, HTN, ESRD (on dialysis M/W/F, last session on 10/14), CAD s/p 1 stent on 2008 and quadruple CABG on 2012, PAD s/p bilateral angioplasty at [facility], was transferred from [facility] complaining of weakness and unhealed wound in the left foot.    # Left foot ulcer and Left calcaneous OM complicated by H/o ESBL E coli Bacteremia at [facility]   debridement in am   - c/w  meropenem 500 mg q 24 hours,  daptomycin 12 mg/kg q 48 hours to cover VRE faecium      # ESRD on HD    # Hyponatremia-resolved    # Anemia sec to ESRD  - c/w renagel  - on Aranesp 40 weekly  - HD as per renal.    # H/o  urinary  retention    # H/o BPH   on flomax    # CAD s/p stent and CABG     #Drug monitoring of high risk medication , potential for drug drug reaction , requiring close monitoring check ck on daptomycin   Creatine Kinase, Serum: 23 U/L (11.02.22 @ 08:13)    # Hypertension  controlled  BP: 138/68 (02 Nov 2022 13:55) (100/56 - 171/77)    #DM type 2  POCT Blood Glucose.: 95 mg/dL (02 Nov 2022 07:30)  POCT Blood Glucose.: 187 mg/dL (01 Nov 2022 21:22)  POCT Blood Glucose.: 246 mg/dL (01 Nov 2022 16:26)  controlled     # DVT prophylaxis    # Full code      PROGRESS NOTE HANDOFF    Pending: debridement in am , then further plan dependant on outcome    Family discussion: patient verbalized understanding and agreeable to plan of care     Disposition: Home

## 2022-11-02 NOTE — PHYSICAL THERAPY INITIAL EVALUATION ADULT - PERTINENT HX OF CURRENT PROBLEM, REHAB EVAL
61 y/o with a PMHx of DM, HTN, ESRD (on dialysis M/W/F, last session on 10/14), CAD s/p 1 stent on 2008 and quadruple CABG on 2012, PAD s/p bilateral angioplasty at [facility], was transferred from [facility] complaining of weakness and unhealed wound in the left foot.  Patient admitted for sepsis, Left Diabetic foot ulcer complicated by calcaneus OM and gangrene s/p excisional debridement

## 2022-11-02 NOTE — PROGRESS NOTE ADULT - ASSESSMENT
63 y/o with a PMHx of DM, HTN, ESRD (on dialysis M/W/F, last session on 10/14), CAD s/p 1 stent on 2008 and quadruple CABG on 2012, PAD s/p bilateral angioplasty at [facility], was transferred from [facility] complaining of weakness and unhealed wound in the left foot.  ESRD - HD today 3h opti 160 3k UF 3l    last Phos noted , cont binders  URINARY retention -  cc, BPH 89 cc (10/25/22)  -cont Flomax,  HTN - better controlled with Nifedipine  Anemia - on Aranesp 40 weekly, on ABx, no Venofer/ resistant in view of infection- Foot ulcer keep Hb >7   PVD -non-healing left foot ulcer s/p LLE angiogram with peroneal angioplasty and atherectomy 10/27/22  - on dapto/ sharlene  will follow

## 2022-11-02 NOTE — PHYSICAL THERAPY INITIAL EVALUATION ADULT - DIAGNOSIS, PT EVAL
Gait dysfunction due to sepsis, Left Diabetic foot ulcer complicated by calcaneus OM and gangrene s/p excisional debridement

## 2022-11-02 NOTE — PROGRESS NOTE ADULT - SUBJECTIVE AND OBJECTIVE BOX
Nephrology progress note  Patient is seen and examined, events over the last 24 h noted .  Lying in bed comfortable   Allergies:  No Known Allergies    Hospital Medications:   MEDICATIONS  (STANDING):    aspirin enteric coated 81 milliGRAM(s) Oral daily  atorvastatin 40 milliGRAM(s) Oral at bedtime  Dakins Solution - 1/2 Strength 1 Application(s) Topical daily  DAPTOmycin IVPB 950 milliGRAM(s) IV Intermittent every 48 hours  darbepoetin Injectable Syringe 40 MICROGram(s) IV Push <User Schedule>  heparin   Injectable 5000 Unit(s) SubCutaneous every 12 hours  influenza   Vaccine 0.5 milliLiter(s) IntraMuscular once  insulin glargine Injectable (LANTUS) 12 Unit(s) SubCutaneous at bedtime  insulin lispro (ADMELOG) corrective regimen sliding scale   SubCutaneous three times a day before meals  meropenem  IVPB 1000 milliGRAM(s) IV Intermittent every 24 hours  NIFEdipine XL 30 milliGRAM(s) Oral daily  polyethylene glycol 3350 17 Gram(s) Oral two times a day  senna 2 Tablet(s) Oral at bedtime  sevelamer carbonate 800 milliGRAM(s) Oral three times a day  tamsulosin 0.4 milliGRAM(s) Oral at bedtime        VITALS:  T(F): 97.7 (11-02-22 @ 05:28), Max: 98 (11-01-22 @ 21:02)  HR: 97 (11-02-22 @ 05:28)  BP: 171/77 (11-02-22 @ 05:28)  RR: 18 (11-02-22 @ 05:28)  SpO2: 96% (11-01-22 @ 21:02)      10-31 @ 07:01  -  11-01 @ 07:00  --------------------------------------------------------  IN: 750 mL / OUT: 2450 mL / NET: -1700 mL    11-01 @ 07:01  -  11-02 @ 07:00  --------------------------------------------------------  IN: 0 mL / OUT: 300 mL / NET: -300 mL          PHYSICAL EXAM:  Constitutional: NAD  Neck: No JVD  Respiratory: CTAB,  Cardiovascular: S1, S2, RRR  Gastrointestinal: BS+, soft, NT/ND  Extremities: No cyanosis or clubbing. No peripheral edema/ foot in dressing   :  No schneider.   Skin: No rashes    LABS:  11-01    138  |  100  |  38<H>  ----------------------------<  64<L>  4.2   |  27  |  5.3<HH>    Ca    8.4      01 Nov 2022 05:54  Mg     1.9     11-01    TPro  6.7  /  Alb  2.5<L>  /  TBili  0.3  /  DBili      /  AST  10  /  ALT  <5  /  AlkPhos  146<H>  11-01                          7.6    9.10  )-----------( 260      ( 02 Nov 2022 08:13 )             22.9     Creatinine Trend: 5.3<--, 7.3<--, 5.8<--, 4.3<--, 5.7<--, 4.3<--  Hemoglobin: 7.6 g/dL (11-02 @ 08:13)  Hemoglobin: 7.4 g/dL (11-01 @ 05:54)  Hemoglobin: 7.2 g/dL (10-31 @ 05:15)    Urine Studies:        Iron 39, TIBC 133, %sat 29      [10-21-22 @ 10:30]  Ferritin 1126      [10-21-22 @ 10:30]  PTH -- (Ca 7.5)      [02-26-22 @ 16:00]   312  Vitamin D (25OH) 21      [02-26-22 @ 16:00]  HbA1c 5.8      [01-30-20 @ 06:46]  Lipid: chol 81, , HDL 22, LDL --      [10-15-22 @ 09:53]    HBsAb <3.0      [12-29-19 @ 17:44]  HBsAb Nonreact      [12-29-19 @ 17:44]  HBsAg Nonreact      [10-27-22 @ 04:09]  HBcAb Nonreact      [12-29-19 @ 17:44]  HCV 0.14, Nonreact      [10-27-22 @ 04:09]        RADIOLOGY & ADDITIONAL STUDIES:

## 2022-11-02 NOTE — CHART NOTE - NSCHARTNOTEFT_GEN_A_CORE
Patient is scheduled for excisional debridement of left foot on Thurs 11/3 @ 12:45 PM. Hemoglobin is 7.6 on 11/2, please adjust accordingly to 8.0 prior to surgery.       Podiatry   [phone]

## 2022-11-02 NOTE — PHYSICAL THERAPY INITIAL EVALUATION ADULT - GAIT DEVIATIONS NOTED, PT EVAL
difficulty maintaining TTWB LLE. No c/o pain on L foot an weight bearing./increased time in double stance/decreased step length/decreased stride length/increased stride width

## 2022-11-03 ENCOUNTER — TRANSCRIPTION ENCOUNTER (OUTPATIENT)
Age: 62
End: 2022-11-03

## 2022-11-03 LAB
ALBUMIN SERPL ELPH-MCNC: 2.8 G/DL — LOW (ref 3.5–5.2)
ALP SERPL-CCNC: 146 U/L — HIGH (ref 30–115)
ALT FLD-CCNC: <5 U/L — SIGNIFICANT CHANGE UP (ref 0–41)
ANION GAP SERPL CALC-SCNC: 10 MMOL/L — SIGNIFICANT CHANGE UP (ref 7–14)
AST SERPL-CCNC: 10 U/L — SIGNIFICANT CHANGE UP (ref 0–41)
BILIRUB SERPL-MCNC: 0.3 MG/DL — SIGNIFICANT CHANGE UP (ref 0.2–1.2)
BUN SERPL-MCNC: 38 MG/DL — HIGH (ref 10–20)
CALCIUM SERPL-MCNC: 8.1 MG/DL — LOW (ref 8.4–10.5)
CHLORIDE SERPL-SCNC: 98 MMOL/L — SIGNIFICANT CHANGE UP (ref 98–110)
CO2 SERPL-SCNC: 30 MMOL/L — SIGNIFICANT CHANGE UP (ref 17–32)
CREAT SERPL-MCNC: 5.1 MG/DL — CRITICAL HIGH (ref 0.7–1.5)
EGFR: 12 ML/MIN/1.73M2 — LOW
GLUCOSE BLDC GLUCOMTR-MCNC: 167 MG/DL — HIGH (ref 70–99)
GLUCOSE BLDC GLUCOMTR-MCNC: 189 MG/DL — HIGH (ref 70–99)
GLUCOSE BLDC GLUCOMTR-MCNC: 210 MG/DL — HIGH (ref 70–99)
GLUCOSE BLDC GLUCOMTR-MCNC: 85 MG/DL — SIGNIFICANT CHANGE UP (ref 70–99)
GLUCOSE SERPL-MCNC: 71 MG/DL — SIGNIFICANT CHANGE UP (ref 70–99)
HCT VFR BLD CALC: 20.7 % — LOW (ref 42–52)
HCT VFR BLD CALC: 24.6 % — LOW (ref 42–52)
HGB BLD-MCNC: 6.9 G/DL — CRITICAL LOW (ref 14–18)
HGB BLD-MCNC: 8.1 G/DL — LOW (ref 14–18)
MAGNESIUM SERPL-MCNC: 1.9 MG/DL — SIGNIFICANT CHANGE UP (ref 1.8–2.4)
MCHC RBC-ENTMCNC: 31.5 PG — HIGH (ref 27–31)
MCHC RBC-ENTMCNC: 31.8 PG — HIGH (ref 27–31)
MCHC RBC-ENTMCNC: 32.9 G/DL — SIGNIFICANT CHANGE UP (ref 32–37)
MCHC RBC-ENTMCNC: 33.3 G/DL — SIGNIFICANT CHANGE UP (ref 32–37)
MCV RBC AUTO: 95.4 FL — HIGH (ref 80–94)
MCV RBC AUTO: 95.7 FL — HIGH (ref 80–94)
NRBC # BLD: 0 /100 WBCS — SIGNIFICANT CHANGE UP (ref 0–0)
NRBC # BLD: 0 /100 WBCS — SIGNIFICANT CHANGE UP (ref 0–0)
PLATELET # BLD AUTO: 228 K/UL — SIGNIFICANT CHANGE UP (ref 130–400)
PLATELET # BLD AUTO: 242 K/UL — SIGNIFICANT CHANGE UP (ref 130–400)
POTASSIUM SERPL-MCNC: 5 MMOL/L — SIGNIFICANT CHANGE UP (ref 3.5–5)
POTASSIUM SERPL-SCNC: 5 MMOL/L — SIGNIFICANT CHANGE UP (ref 3.5–5)
PROT SERPL-MCNC: 6.7 G/DL — SIGNIFICANT CHANGE UP (ref 6–8)
RBC # BLD: 2.17 M/UL — LOW (ref 4.7–6.1)
RBC # BLD: 2.57 M/UL — LOW (ref 4.7–6.1)
RBC # FLD: 14.5 % — SIGNIFICANT CHANGE UP (ref 11.5–14.5)
RBC # FLD: 14.6 % — HIGH (ref 11.5–14.5)
SODIUM SERPL-SCNC: 138 MMOL/L — SIGNIFICANT CHANGE UP (ref 135–146)
WBC # BLD: 8.31 K/UL — SIGNIFICANT CHANGE UP (ref 4.8–10.8)
WBC # BLD: 8.85 K/UL — SIGNIFICANT CHANGE UP (ref 4.8–10.8)
WBC # FLD AUTO: 8.31 K/UL — SIGNIFICANT CHANGE UP (ref 4.8–10.8)
WBC # FLD AUTO: 8.85 K/UL — SIGNIFICANT CHANGE UP (ref 4.8–10.8)

## 2022-11-03 PROCEDURE — 20240 BONE BIOPSY OPEN SUPERFICIAL: CPT | Mod: 59,58

## 2022-11-03 PROCEDURE — 28120 PART REMOVAL OF ANKLE/HEEL: CPT | Mod: 58,LT

## 2022-11-03 PROCEDURE — 11046 DBRDMT MUSC&/FSCA EA ADDL: CPT | Mod: 59,58

## 2022-11-03 PROCEDURE — 11043 DBRDMT MUSC&/FSCA 1ST 20/<: CPT | Mod: 59,58

## 2022-11-03 PROCEDURE — 99233 SBSQ HOSP IP/OBS HIGH 50: CPT

## 2022-11-03 RX ORDER — DAPTOMYCIN 500 MG/10ML
950 INJECTION, POWDER, LYOPHILIZED, FOR SOLUTION INTRAVENOUS
Refills: 0 | Status: DISCONTINUED | OUTPATIENT
Start: 2022-11-03 | End: 2022-11-08

## 2022-11-03 RX ORDER — SENNA PLUS 8.6 MG/1
2 TABLET ORAL AT BEDTIME
Refills: 0 | Status: DISCONTINUED | OUTPATIENT
Start: 2022-11-03 | End: 2022-11-08

## 2022-11-03 RX ORDER — INSULIN GLARGINE 100 [IU]/ML
12 INJECTION, SOLUTION SUBCUTANEOUS AT BEDTIME
Refills: 0 | Status: DISCONTINUED | OUTPATIENT
Start: 2022-11-03 | End: 2022-11-08

## 2022-11-03 RX ORDER — HYDROMORPHONE HYDROCHLORIDE 2 MG/ML
0.5 INJECTION INTRAMUSCULAR; INTRAVENOUS; SUBCUTANEOUS
Refills: 0 | Status: DISCONTINUED | OUTPATIENT
Start: 2022-11-03 | End: 2022-11-03

## 2022-11-03 RX ORDER — SEVELAMER CARBONATE 2400 MG/1
800 POWDER, FOR SUSPENSION ORAL THREE TIMES A DAY
Refills: 0 | Status: DISCONTINUED | OUTPATIENT
Start: 2022-11-03 | End: 2022-11-08

## 2022-11-03 RX ORDER — ONDANSETRON 8 MG/1
4 TABLET, FILM COATED ORAL EVERY 8 HOURS
Refills: 0 | Status: DISCONTINUED | OUTPATIENT
Start: 2022-11-03 | End: 2022-11-08

## 2022-11-03 RX ORDER — TAMSULOSIN HYDROCHLORIDE 0.4 MG/1
0.4 CAPSULE ORAL AT BEDTIME
Refills: 0 | Status: DISCONTINUED | OUTPATIENT
Start: 2022-11-03 | End: 2022-11-08

## 2022-11-03 RX ORDER — DARBEPOETIN ALFA IN POLYSORBAT 200MCG/0.4
40 PEN INJECTOR (ML) SUBCUTANEOUS
Refills: 0 | Status: DISCONTINUED | OUTPATIENT
Start: 2022-11-03 | End: 2022-11-08

## 2022-11-03 RX ORDER — ACETAMINOPHEN 500 MG
650 TABLET ORAL EVERY 6 HOURS
Refills: 0 | Status: DISCONTINUED | OUTPATIENT
Start: 2022-11-03 | End: 2022-11-08

## 2022-11-03 RX ORDER — SODIUM HYPOCHLORITE 0.125 %
1 SOLUTION, NON-ORAL MISCELLANEOUS DAILY
Refills: 0 | Status: DISCONTINUED | OUTPATIENT
Start: 2022-11-03 | End: 2022-11-08

## 2022-11-03 RX ORDER — ASPIRIN/CALCIUM CARB/MAGNESIUM 324 MG
81 TABLET ORAL DAILY
Refills: 0 | Status: DISCONTINUED | OUTPATIENT
Start: 2022-11-03 | End: 2022-11-08

## 2022-11-03 RX ORDER — SODIUM CHLORIDE 9 MG/ML
1000 INJECTION, SOLUTION INTRAVENOUS
Refills: 0 | Status: DISCONTINUED | OUTPATIENT
Start: 2022-11-03 | End: 2022-11-03

## 2022-11-03 RX ORDER — ATORVASTATIN CALCIUM 80 MG/1
40 TABLET, FILM COATED ORAL AT BEDTIME
Refills: 0 | Status: DISCONTINUED | OUTPATIENT
Start: 2022-11-03 | End: 2022-11-08

## 2022-11-03 RX ORDER — HEPARIN SODIUM 5000 [USP'U]/ML
5000 INJECTION INTRAVENOUS; SUBCUTANEOUS EVERY 12 HOURS
Refills: 0 | Status: DISCONTINUED | OUTPATIENT
Start: 2022-11-03 | End: 2022-11-08

## 2022-11-03 RX ORDER — POLYETHYLENE GLYCOL 3350 17 G/17G
17 POWDER, FOR SOLUTION ORAL
Refills: 0 | Status: DISCONTINUED | OUTPATIENT
Start: 2022-11-03 | End: 2022-11-08

## 2022-11-03 RX ORDER — LANOLIN ALCOHOL/MO/W.PET/CERES
3 CREAM (GRAM) TOPICAL AT BEDTIME
Refills: 0 | Status: DISCONTINUED | OUTPATIENT
Start: 2022-11-03 | End: 2022-11-08

## 2022-11-03 RX ORDER — INSULIN LISPRO 100/ML
VIAL (ML) SUBCUTANEOUS
Refills: 0 | Status: DISCONTINUED | OUTPATIENT
Start: 2022-11-03 | End: 2022-11-08

## 2022-11-03 RX ORDER — NIFEDIPINE 30 MG
30 TABLET, EXTENDED RELEASE 24 HR ORAL DAILY
Refills: 0 | Status: DISCONTINUED | OUTPATIENT
Start: 2022-11-03 | End: 2022-11-08

## 2022-11-03 RX ORDER — OXYCODONE AND ACETAMINOPHEN 5; 325 MG/1; MG/1
1 TABLET ORAL EVERY 6 HOURS
Refills: 0 | Status: DISCONTINUED | OUTPATIENT
Start: 2022-11-03 | End: 2022-11-08

## 2022-11-03 RX ADMIN — Medication 2: at 17:00

## 2022-11-03 RX ADMIN — INSULIN GLARGINE 12 UNIT(S): 100 INJECTION, SOLUTION SUBCUTANEOUS at 21:55

## 2022-11-03 RX ADMIN — Medication 30 MILLIGRAM(S): at 05:28

## 2022-11-03 RX ADMIN — SEVELAMER CARBONATE 800 MILLIGRAM(S): 2400 POWDER, FOR SUSPENSION ORAL at 21:56

## 2022-11-03 RX ADMIN — SENNA PLUS 2 TABLET(S): 8.6 TABLET ORAL at 21:56

## 2022-11-03 RX ADMIN — HEPARIN SODIUM 5000 UNIT(S): 5000 INJECTION INTRAVENOUS; SUBCUTANEOUS at 17:01

## 2022-11-03 RX ADMIN — SEVELAMER CARBONATE 800 MILLIGRAM(S): 2400 POWDER, FOR SUSPENSION ORAL at 05:29

## 2022-11-03 RX ADMIN — DAPTOMYCIN 138 MILLIGRAM(S): 500 INJECTION, POWDER, LYOPHILIZED, FOR SOLUTION INTRAVENOUS at 17:02

## 2022-11-03 RX ADMIN — SEVELAMER CARBONATE 800 MILLIGRAM(S): 2400 POWDER, FOR SUSPENSION ORAL at 00:34

## 2022-11-03 RX ADMIN — TAMSULOSIN HYDROCHLORIDE 0.4 MILLIGRAM(S): 0.4 CAPSULE ORAL at 21:59

## 2022-11-03 RX ADMIN — ATORVASTATIN CALCIUM 40 MILLIGRAM(S): 80 TABLET, FILM COATED ORAL at 21:59

## 2022-11-03 RX ADMIN — POLYETHYLENE GLYCOL 3350 17 GRAM(S): 17 POWDER, FOR SOLUTION ORAL at 17:01

## 2022-11-03 RX ADMIN — POLYETHYLENE GLYCOL 3350 17 GRAM(S): 17 POWDER, FOR SOLUTION ORAL at 05:28

## 2022-11-03 NOTE — PRE-OP CHECKLIST - 2.
Patient received 1 unit PRBC as per 4B RN; podiatry resident, Dr. Garvin & anesthesia aware. STAT CBC drawn pre-operatively. As per Dr. Garvin & anesthesia, surgery to proceed before CBC resulted. Patient received 1 unit PRBC (after Hgb 6.9 as per 4B RN); podiatry resident, Dr. Garvin, OR NADIR Crisostomo & anesthesia aware. STAT CBC drawn pre-operatively. As per Dr. Garvin & anesthesia, surgery to proceed before CBC resulted-NADIR Crisostomo notified.

## 2022-11-03 NOTE — PRE-ANESTHESIA EVALUATION ADULT - MALLAMPATI CLASS
Class II - visualization of the soft palate, fauces, and uvula
Class III - visualization of the soft palate and the base of the uvula
Class III - visualization of the soft palate and the base of the uvula
Class II - visualization of the soft palate, fauces, and uvula
Class III - visualization of the soft palate and the base of the uvula

## 2022-11-03 NOTE — PRE-ANESTHESIA EVALUATION ADULT - NSANTHAPLANRD_GEN_ALL_CORE
monitored anesthesia care (MAC)
general

## 2022-11-03 NOTE — PRE-ANESTHESIA EVALUATION ADULT - NSANTHPEFT_GEN_ALL_CORE
cor; rrr s1s2 nl  lungs: clear
NAD  RRR  CTABL  Left AV fistula LUE with palpable thrill
lung cta  cv rrr s1s2

## 2022-11-03 NOTE — PROGRESS NOTE ADULT - SUBJECTIVE AND OBJECTIVE BOX
seen and examined  no distress   no new complaints         PAST HISTORY  --------------------------------------------------------------------------------  No significant changes to PMH, PSH, FHx, SHx, unless otherwise noted    ALLERGIES & MEDICATIONS  --------------------------------------------------------------------------------  Allergies    No Known Allergies    Intolerances      Standing Inpatient Medications  aspirin enteric coated 81 milliGRAM(s) Oral daily  atorvastatin 40 milliGRAM(s) Oral at bedtime  Dakins Solution - 1/2 Strength 1 Application(s) Topical daily  DAPTOmycin IVPB 950 milliGRAM(s) IV Intermittent every 48 hours  darbepoetin Injectable Syringe 40 MICROGram(s) IV Push <User Schedule>  influenza   Vaccine 0.5 milliLiter(s) IntraMuscular once  insulin glargine Injectable (LANTUS) 12 Unit(s) SubCutaneous at bedtime  insulin lispro (ADMELOG) corrective regimen sliding scale   SubCutaneous three times a day before meals  NIFEdipine XL 30 milliGRAM(s) Oral daily  polyethylene glycol 3350 17 Gram(s) Oral two times a day  senna 2 Tablet(s) Oral at bedtime  sevelamer carbonate 800 milliGRAM(s) Oral three times a day  tamsulosin 0.4 milliGRAM(s) Oral at bedtime    PRN Inpatient Medications  acetaminophen     Tablet .. 650 milliGRAM(s) Oral every 6 hours PRN  aluminum hydroxide/magnesium hydroxide/simethicone Suspension 30 milliLiter(s) Oral every 4 hours PRN  melatonin 3 milliGRAM(s) Oral at bedtime PRN  ondansetron Injectable 4 milliGRAM(s) IV Push every 8 hours PRN          VITALS/PHYSICAL EXAM  --------------------------------------------------------------------------------  T(C): 36.6 (11-03-22 @ 04:52), Max: 36.6 (11-02-22 @ 21:00)  HR: 98 (11-03-22 @ 04:52) (95 - 98)  BP: 161/74 (11-03-22 @ 04:52) (122/66 - 161/74)  RR: 18 (11-03-22 @ 04:52) (18 - 19)  SpO2: 97% (11-03-22 @ 04:52) (97% - 98%)  Wt(kg): --        11-02-22 @ 07:01  -  11-03-22 @ 07:00  --------------------------------------------------------  IN: 435 mL / OUT: 3000 mL / NET: -2565 mL      Physical Exam:  	Gen: NAD,   	Pulm: CTA B/L  	CV:  S1S2; no rub  	Abd: +distended  	LE: dressing   	Vascular access:av    LABS/STUDIES  --------------------------------------------------------------------------------              6.9    8.31  >-----------<  228      [11-03-22 @ 06:40]              20.7     138  |  98  |  38  ----------------------------<  71      [11-03-22 @ 06:40]  5.0   |  30  |  5.1        Ca     8.1     [11-03-22 @ 06:40]      Mg     1.9     [11-03-22 @ 06:40]    TPro  6.7  /  Alb  2.8  /  TBili  0.3  /  DBili  x   /  AST  10  /  ALT  <5  /  AlkPhos  146  [11-03-22 @ 06:40]    PT/INR: PT 12.10, INR 1.06       [11-02-22 @ 08:13]  PTT: 31.6       [11-02-22 @ 08:13]    CK 23      [11-02-22 @ 08:13]    Creatinine Trend:  SCr 5.1 [11-03 @ 06:40]  SCr 6.4 [11-02 @ 08:13]  SCr 5.3 [11-01 @ 05:54]  SCr 7.3 [10-31 @ 05:15]  SCr 5.8 [10-28 @ 07:18]    Urinalysis - [10-23-22 @ 18:53]      Color Yellow / Appearance Slightly Turbid / SG 1.011 / pH 8.5      Gluc 200 mg/dL / Ketone Negative  / Bili Negative / Urobili <2 mg/dL       Blood Trace / Protein 100 mg/dL / Leuk Est Small / Nitrite Negative      RBC 6 / WBC 13 / Hyaline 2 / Gran  / Sq Epi  / Non Sq Epi 9 / Bacteria Negative      Iron 39, TIBC 133, %sat 29      [10-21-22 @ 10:30]  Ferritin 1126      [10-21-22 @ 10:30]  PTH -- (Ca 7.5)      [02-26-22 @ 16:00]   312  Vitamin D (25OH) 21      [02-26-22 @ 16:00]  HbA1c 5.8      [01-30-20 @ 06:46]  Lipid: chol 81, , HDL 22, LDL --      [10-15-22 @ 09:53]    HBsAg Nonreact      [10-27-22 @ 04:09]  HCV 0.14, Nonreact      [10-27-22 @ 04:09]

## 2022-11-03 NOTE — BRIEF OPERATIVE NOTE - NSICDXBRIEFPREOP_GEN_ALL_CORE_FT
PRE-OP DIAGNOSIS:  Acute osteomyelitis 21-Oct-2022 13:27:49 Left foot osteomyelitis Lanette Rollins  

## 2022-11-03 NOTE — ASU PREOP CHECKLIST - WEIGHT IN KG
Detail Level: Generalized Plan: S/p ln2 x2, s/p bx x1, s/p ILK (Holger-5) x1. Second ILK today (Holger-10 today) and pt to call back if not improving. We will refer to [facility]SD for MOHS at that time. 89.8

## 2022-11-03 NOTE — BRIEF OPERATIVE NOTE - NSEVIDNCEINFORABSCESSFT_GEN_ALL_CORE
About 5 cc of pus w/ necrotic, liquified malodorous wound on L heel
Crumble calcaneus
Fibrotic soft tissue noted;

## 2022-11-03 NOTE — PROGRESS NOTE ADULT - SUBJECTIVE AND OBJECTIVE BOX
SCHWABACHER, LAWRENCE  62y  [facility] F3-4B 009 B      Patient is a 62y old  Male who presents with a chief complaint of Sepsis (03 Nov 2022 08:40)      INTERVAL HPI/OVERNIGHT EVENTS:  no acute events overnight       REVIEW OF SYSTEMS:  CONSTITUTIONAL: No fever, weight loss, or fatigue  EYES: No eye pain, visual disturbances, or discharge  ENMT:  No difficulty hearing, tinnitus, vertigo; No sinus or throat pain  NECK: No pain or stiffness  BREASTS: No pain, masses, or nipple discharge  RESPIRATORY: No cough, wheezing, chills or hemoptysis; No shortness of breath  CARDIOVASCULAR: No chest pain, palpitations, dizziness, or leg swelling  GASTROINTESTINAL: No abdominal or epigastric pain. No nausea, vomiting, or hematemesis; No diarrhea or constipation. No melena or hematochezia.  GENITOURINARY: No dysuria, frequency, hematuria, or incontinence  NEUROLOGICAL: No headaches, memory loss, loss of strength, numbness, or tremors  SKIN: No itching, burning, rashes, or lesions   LYMPH NODES: No enlarged glands  ENDOCRINE: No heat or cold intolerance; No hair loss  MUSCULOSKELETAL: No joint pain or swelling; No muscle, back, or extremity pain  PSYCHIATRIC: No depression, anxiety, mood swings, or difficulty sleeping  HEME/LYMPH: No easy bruising, or bleeding gums  ALLERY AND IMMUNOLOGIC: No hives or eczema  FAMILY HISTORY:  Family history of heart disease (Father)    DM (diabetes mellitus) (Mother)    ESRD (end stage renal disease) on dialysis (Mother)      T(C): 36.2 (11-03-22 @ 09:51), Max: 36.6 (11-02-22 @ 21:00)  HR: 89 (11-03-22 @ 09:51) (89 - 98)  BP: 152/67 (11-03-22 @ 09:51) (122/66 - 161/74)  RR: 18 (11-03-22 @ 09:51) (18 - 19)  SpO2: 97% (11-03-22 @ 09:51) (97% - 98%)  Wt(kg): --Vital Signs Last 24 Hrs  T(C): 36.2 (03 Nov 2022 09:51), Max: 36.6 (02 Nov 2022 21:00)  T(F): 97.1 (03 Nov 2022 09:51), Max: 97.9 (02 Nov 2022 21:00)  HR: 89 (03 Nov 2022 09:51) (89 - 98)  BP: 152/67 (03 Nov 2022 09:51) (122/66 - 161/74)  BP(mean): --  RR: 18 (03 Nov 2022 09:51) (18 - 19)  SpO2: 97% (03 Nov 2022 09:51) (97% - 98%)    Parameters below as of 03 Nov 2022 04:52  Patient On (Oxygen Delivery Method): room air        PHYSICAL EXAM:  GENERAL: NAD, well-groomed, well-developed  HEAD:  Atraumatic, Normocephalic  EYES: EOMI, PERRLA, conjunctiva and sclera clear  ENMT: No tonsillar erythema, exudates, or enlargement; Moist mucous membranes, Good dentition, No lesions  NECK: Supple, No JVD, Normal thyroid  NERVOUS SYSTEM:  Alert & Oriented X3, Good concentration; Motor Strength 5/5 B/L upper and lower extremities; DTRs 2+ intact and symmetric  PULM: Clear to auscultation bilaterally  CARDIAC: Regular rate and rhythm; No murmurs, rubs, or gallops  GI: Soft, Nontender, Nondistended; Bowel sounds present  EXTREMITIES:  2+ Peripheral Pulses, No clubbing, cyanosis, or edema Left foot wrapped   LYMPH: No lymphadenopathy noted  SKIN: No rashes or lesions    Consultant(s) Notes Reviewed:  [x ] YES  [ ] NO  Care Discussed with Consultants/Other Providers [ x] YES  [ ] NO    LABS:                            6.9    8.31  )-----------( 228      ( 03 Nov 2022 06:40 )             20.7   11-03    138  |  98  |  38<H>  ----------------------------<  71  5.0   |  30  |  5.1<HH>    Ca    8.1<L>      03 Nov 2022 06:40  Mg     1.9     11-03    TPro  6.7  /  Alb  2.8<L>  /  TBili  0.3  /  DBili  x   /  AST  10  /  ALT  <5  /  AlkPhos  146<H>  11-03            acetaminophen     Tablet .. 650 milliGRAM(s) Oral every 6 hours PRN  aluminum hydroxide/magnesium hydroxide/simethicone Suspension 30 milliLiter(s) Oral every 4 hours PRN  aspirin enteric coated 81 milliGRAM(s) Oral daily  atorvastatin 40 milliGRAM(s) Oral at bedtime  Dakins Solution - 1/2 Strength 1 Application(s) Topical daily  DAPTOmycin IVPB 950 milliGRAM(s) IV Intermittent every 48 hours  darbepoetin Injectable Syringe 40 MICROGram(s) IV Push <User Schedule>  influenza   Vaccine 0.5 milliLiter(s) IntraMuscular once  insulin glargine Injectable (LANTUS) 12 Unit(s) SubCutaneous at bedtime  insulin lispro (ADMELOG) corrective regimen sliding scale   SubCutaneous three times a day before meals  melatonin 3 milliGRAM(s) Oral at bedtime PRN  NIFEdipine XL 30 milliGRAM(s) Oral daily  ondansetron Injectable 4 milliGRAM(s) IV Push every 8 hours PRN  polyethylene glycol 3350 17 Gram(s) Oral two times a day  senna 2 Tablet(s) Oral at bedtime  sevelamer carbonate 800 milliGRAM(s) Oral three times a day  tamsulosin 0.4 milliGRAM(s) Oral at bedtime      HEALTH ISSUES - PROBLEM Dx:          Case Discussed with House Staff      Spectra [phone]

## 2022-11-03 NOTE — BRIEF OPERATIVE NOTE - NSICDXBRIEFPROCEDURE_GEN_ALL_CORE_FT
PROCEDURES:  Angiogram, infrapopliteal 27-Oct-2022 13:19:41  En Rodriguez  Angioplasty, artery, peroneal, open or percutaneous approach 27-Oct-2022 13:20:20  En Rodriguez  Atherectomy, artery, peroneal 27-Oct-2022 13:20:34  En Rodriguez  
PROCEDURES:  Excisional debridement of ulcer of left foot 21-Oct-2022 13:24:47 Left foot osteomyelitis Lanette Rollins  

## 2022-11-03 NOTE — PROGRESS NOTE ADULT - ASSESSMENT
61 y/o with a PMHx of DM, HTN, ESRD (on dialysis M/W/F, last session on 10/14), CAD s/p 1 stent on 2008 and quadruple CABG on 2012, PAD s/p bilateral angioplasty at [facility], was transferred from [facility] complaining of weakness and unhealed wound in the left foot.    # Left foot ulcer and Left calcaneous OM complicated by H/o ESBL E coli Bacteremia at [facility]   debridement in am   - c/w  meropenem 500 mg q 24 hours,  daptomycin 12 mg/kg q 48 hours to cover VRE faecium      # ESRD on HD    # Hyponatremia-resolved    # H/o  urinary  retention    # H/o BPH   on flomax    # CAD s/p stent and CABG     #Drug monitoring of high risk medication , potential for drug drug reaction , requiring close monitoring check ck on daptomycin   repeat ck in am     # Hypertension  controlled  BP: 152/67 (03 Nov 2022 09:51) (122/66 - 161/74)  controlled    #DM type 2  POCT Blood Glucose.: 85 mg/dL (03 Nov 2022 07:39)  POCT Blood Glucose.: 216 mg/dL (02 Nov 2022 20:56)  POCT Blood Glucose.: 217 mg/dL (02 Nov 2022 16:20)  controlled     #Anemia likely due to anemia of chronic disease with no evidence of bleeding , transfuse one prbc pre op     # DVT prophylaxis    # Full code      PROGRESS NOTE HANDOFF    Pending: debridement today  , then further plan dependant on outcome    Family discussion: patient verbalized understanding and agreeable to plan of care     Disposition: Home

## 2022-11-03 NOTE — PRE-ANESTHESIA EVALUATION ADULT - NS MD HP INPLANTS MED DEV
LEFT AV FISTULA/Vascular access device

## 2022-11-03 NOTE — PRE-ANESTHESIA EVALUATION ADULT - NSANTHINDVINFOSD_GEN_ALL_CORE
Advance Care Planning   Advance Care Planning Inpatient Note  [facility] Department    Today's Date: 11/24/2021  Unit: THE [facility] 1 INTENSIVE CARE    Received request from rounding. Upon review of chart and communication with care team, patient's decision making abilities are not in question. Patient was/were present in the room during visit.     Goals of ACP Conversation:  Discuss Advance Care planning documents    Health Care Decision Makers:      Primary Decision Maker: Cheli Flmoriah Spouse - [phone]      Summary:  No Decision Maker named by patient at this time    Tj 53 (Patient Wishes) on file:  None     Interventions:  Deferred conversation as patient not interested in completing an advance directive at this time    Care Preferences Communicated:  No    Outcomes/Plan:  ACP Discussion Completed    Chaplain Tyler on 11/24/2021 at 1:22 PM
patient

## 2022-11-03 NOTE — BRIEF OPERATIVE NOTE - ASSISTANT(S)
pgy vi
Dr. Rollins, Lanette Manuel PGY-3

## 2022-11-03 NOTE — BRIEF OPERATIVE NOTE - SPECIMENS
Surgical bone culture of left calcaneus
none
Surgical debrided soft tissue pathology
Surgical deep wound culture left foot, surgical bone culture of left foot, surgical debrided soft tissue and bone of left foot;

## 2022-11-03 NOTE — PROGRESS NOTE ADULT - ASSESSMENT
63 y/o with a PMHx of DM, HTN, ESRD (on dialysis M/W/F, last session on 10/14), CAD s/p 1 stent on 2008 and quadruple CABG on 2012, PAD s/p bilateral angioplasty at [facility], was transferred from [facility] complaining of weakness and unhealed wound in the left foot.    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 hx of PAD   - IR for angiogram 10/20/22: a) Left lower extremity angiogram demonstrating chronically occluded PT and AT with patent anterior tibial vein bypass reconstituting into the DP. b) No microvascular outflow demonstrates within the calcaneous. No hemodynamically significant stenosis within the left SFA and popliteal artery. No intervention performed  - Blood cx 10/15:NTD   - Wound Cx 9/16 - Enterobacter cloacae  - treated with two weeks post-HD vancomycin + cefepime  - wound CX 10/16 VRE Faecim, Enterobacter cloacae complex, Proteus vulgaris, Morganella morganii   - s/p debridement 10/21 Wound Cx Proteus mirabilis, VRE faecium, Pseudomonas putida  - MRI L foot 10/17: a) Osteomyelitis and erosion of the calcaneus with a vertical pathologic fracture extending to the subtalar joint. Gangrene of overlying subcutaneous tissues. 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  - ID consult appreciated - will likely need at least 6 weeks from last debridement for calcaneal OM  - Cont meropenem 500mg IV daily   - C/w daptomycin 12 mg/kg q 48 hours to cover VRE faecium   - Creatine Kinase, Serum: 15 U/L (10.26.22 @ 06:53)  - Creatine Kinase, Serum: 23 U/L (11.1.22 @ 08:13) -- check CK weekly 2/2 daptomycin use (next check 11/9)  - Podiatry consult:  a) Local Wound Care; Wound Flushed w/ NS; Wound Packed w/ xeroform/ ABD x2 / DSD / Kerlix / Ace bandage  b) Weight Bearing Status; WBAT w/ Heel Touch w/ Surgical Shoe;   c) Continue w/ Local Wound Care; Q24 Dressing Changes;  - S/p Initial debridement 10/21, S/p 10/24/22 excisional debridement of soft tissue and bone of left foot; (pt given 1u pRBCs pre-op)  - S/p 10/27 left lower extremity angiogram, peroneal artery angioplasty and atherectomy 2/ occluded segment found on previous angiogram with Dr. Malik  - Cardio consult 10/26 appreciated patient at high risk for surgery  - Podiatry Plan for 3rd surgical debridement with wound VAC application after vascular procedure - scheduled for 11/3/22    #Dysuria - r/o UTI - improving   #H/o BPH  - Pt already receiving meropenem - should cover most UTI causing bacteria  - UA 10/23 WBC 13, nitrite negative, LEC small, bacteria negative   - 10/25 urine cx (-)  - 10/25 Kidney & Bladder sonogram (-) for hydronephrosis   -  cc, BPH 89 cc (10/25/22)  - DC fluconazole (10/25-10/28) in case of fungal origin 5 d. course per ID - stopped 10/28 2/ QTC   - EKG 10/25: . --> 10/27 . --> 10/28   - Per nephro d/c phenazopyridine in HD patient  - Started Flomax 0.4mg PO q24h  - Repeat bladder scan (11/1): 17cc.    #HTN  - C/w nifedipine 30mg PO q24h    # ESRD on HD  - HD as per nephrology   - C/w darbepoetin  - C/w Sevelamer 800mg TID    # CAD s/p stent and CABG   - Lipitor 40mg qHS  - Discuss with cardio. we're unable to hold aspirin. Cont aspirin   - holding plavix since 10/16  - Continue BB  - Cardio consult 10/19 appreciated at moderate risk for surgery     # DM II  - Follow FSG  - Lantus 12U qHS, +2 CF insulin sliding scale    # Constipation - resolved   - C/w Miralax, senna daily    #Other  Diet: DASH/TLC, Renal  Activity: Increase as tolerated, LLE toe touch weight bearing with surgical shoe  GI PPX: None  DVT PPX: Heparin 5000U subQ q12h  Dispo: From home, pending debridement revision, final Abx recommendations

## 2022-11-03 NOTE — PRE-ANESTHESIA EVALUATION ADULT - NSANTHRISKNONERD_GEN_ALL_CORE
Risk Alerts:
No risk alerts present

## 2022-11-03 NOTE — BRIEF OPERATIVE NOTE - NSICDXBRIEFPOSTOP_GEN_ALL_CORE_FT
POST-OP DIAGNOSIS:  Acute osteomyelitis 21-Oct-2022 13:28:05 Left foot osteomyelitis Lanette Rollins  

## 2022-11-03 NOTE — CHART NOTE - NSCHARTNOTEFT_GEN_A_CORE
PACU ANESTHESIA ADMISSION NOTE      Procedure: Excisional debridement of ulcer of left foot  Left foot osteomyelitis    Angiogram, infrapopliteal    Angioplasty, artery, peroneal, open or percutaneous approach    Atherectomy, artery, peroneal      Post op diagnosis:  Acute osteomyelitis        ____  Intubated  TV:______       Rate: ______      FiO2: ______    __x__  Patent Airway    ____  Full return of protective reflexes    ____  Full recovery from anesthesia / back to baseline     Vitals:   T:       98    R:   12               BP:     153/71              Sat:  99%                 P:  100      Mental Status:  __x__ Awake   _____ Alert   _____ Drowsy   _____ Sedated    Nausea/Vomiting:  __x__ NO  ______Yes,   See Post - Op Orders          Pain Scale (0-10):  _____    Treatment: ____ None    ___x_ See Post - Op/PCA Orders    Post - Operative Fluids:   ____ Oral   ___x_ See Post - Op Orders    Plan: Discharge:   ____Home       ___x__Floor     _____Critical Care    _____  Other:_________________    Comments: Uneventful intraoperative course. No anesthesia issues or complications noted.  Patient stable upon arrival to PACU. Report given to RN. Discharge when criteria met.

## 2022-11-03 NOTE — PROGRESS NOTE ADULT - SUBJECTIVE AND OBJECTIVE BOX
LAWRENCE SCHWABACHER 62y Male  MRN#: [id number]  Hospital Day: 20d    Pt is currently admitted with the primary diagnosis of sepsis POA secondary to diabetic foot ulcer    HPI: 61 y/o with a PMHx of DM, HTN, ESRD (on dialysis M/W/F, last session on 10/14), CAD s/p 1 stent on 2008 and quadruple CABG on 2012, PAD s/p bilateral angioplasty at [facility], was transferred from [facility] complaining of weakness and unhealed wound in the left foot.    SUBJECTIVE  Overnight events: None  Subjective complaints: None. Denies fever, chills, chest pain, shortness of breath, N/V/D.    Given 1U pRBCs this morning for per Podiatry rec for pre-op. Plan for debridement today.    Present Today:   - Lacey:  No [ x ], Yes [   ] : Indication:     - Type of IV Access:       .. CVC/Piccline:  No [ x ], Yes [   ] : Indication:       .. Midline: No [ x ], Yes [   ] : Indication:                                             ----------------------------------------------------------  OBJECTIVE    VITAL SIGNS: Last 24 Hours  T(C): 36.3 (03 Nov 2022 10:40), Max: 36.6 (02 Nov 2022 21:00)  T(F): 97.4 (03 Nov 2022 10:40), Max: 97.9 (02 Nov 2022 21:00)  HR: 93 (03 Nov 2022 10:40) (89 - 98)  BP: 153/75 (03 Nov 2022 10:40) (122/66 - 161/74)  BP(mean): --  RR: 18 (03 Nov 2022 10:40) (18 - 19)  SpO2: 97% (03 Nov 2022 09:51) (97% - 98%)    11-02-22 @ 07:01  -  11-03-22 @ 07:00  --------------------------------------------------------  IN: 435 mL / OUT: 3000 mL / NET: -2565 mL      PHYSICAL EXAM:  General: Laying in bed. In NAD.  HEENT: Normocephalic, nontraumatic. PERRL.  LUNGS: Clear to auscultation b/l. No wheezes, rales, or rhonchi.  HEART: RRR. No murmurs, rubs, or gallops.  ABDOMEN: Soft, nontender, nondistended. Normoactive bowel sounds.  EXT: Pulses 2+ UE, diminished in RLE, unable to check LLE. No lower extremity edema. Left foot with dressing, intact and clean.  NEURO: AAOx4.  SKIN: Warm, dry.    PAST MEDICAL & SURGICAL HISTORY  CAD (coronary artery disease) 1 stent 2008  S/P CABG (coronary artery bypass graft) x4  Diabetes mellitus  Transient ischemic attack (TIA) 2017; 2008  Chronic kidney disease (CKD) Stage IV  Hypertension  Stented coronary artery in 2008  Myocardial infarction 2012  PAD (peripheral artery disease)  S/p bypass left leg  HLD (hyperlipidemia)  BPH (benign prostatic hyperplasia)  Pain in left knee s/p fall  OA (osteoarthritis)  [place] (hard of hearing)  Chronic anemia  S/P CABG (coronary artery bypass graft) 2012  H/O arterial bypass of lower limb Left Lower Extremity (2016)    History of surgery  Left CEA (2017)  Left Pinkie toe Amputation (2014)  CABG x 4 (2012)  Card cath - stent (2008)  AV fistula 2019  LEFT AV FISTULA                                            -----------------------------------------------------------  ALLERGIES:  No Known Allergies                                            ------------------------------------------------------------    HOME MEDICATIONS  Home Medications:  aspirin 81 mg oral tablet: 1 tab(s) orally once a day (14 Sep 2022 17:42)  furosemide 40 mg oral tablet: 2 tab(s) orally once a day (14 Sep 2022 17:42)  Levemir 100 units/mL subcutaneous solution: 40 unit(s) subcutaneous once a day (at bedtime) (14 Sep 2022 17:42)  Metoprolol Tartrate 50 mg oral tablet: 1 tab(s) orally 2 times a day (14 Sep 2022 17:42)  Plavix 75 mg oral tablet: 1 tab(s) orally once a day (14 Sep 2022 17:42)  sevelamer carbonate 800 mg oral tablet: 1 tab(s) orally 3 times a day (with meals) (20 Sep 2022 11:36)  Trulicity Pen 1.5 mg/0.5 mL subcutaneous solution: 1.5  subcutaneous once a week (14 Sep 2022 17:42)                           MEDICATIONS:  STANDING MEDICATIONS  aspirin enteric coated 81 milliGRAM(s) Oral daily  atorvastatin 40 milliGRAM(s) Oral at bedtime  Dakins Solution - 1/2 Strength 1 Application(s) Topical daily  DAPTOmycin IVPB 950 milliGRAM(s) IV Intermittent every 48 hours  darbepoetin Injectable Syringe 40 MICROGram(s) IV Push <User Schedule>  influenza   Vaccine 0.5 milliLiter(s) IntraMuscular once  insulin glargine Injectable (LANTUS) 12 Unit(s) SubCutaneous at bedtime  insulin lispro (ADMELOG) corrective regimen sliding scale   SubCutaneous three times a day before meals  NIFEdipine XL 30 milliGRAM(s) Oral daily  polyethylene glycol 3350 17 Gram(s) Oral two times a day  senna 2 Tablet(s) Oral at bedtime  sevelamer carbonate 800 milliGRAM(s) Oral three times a day  tamsulosin 0.4 milliGRAM(s) Oral at bedtime    PRN MEDICATIONS  acetaminophen     Tablet .. 650 milliGRAM(s) Oral every 6 hours PRN  aluminum hydroxide/magnesium hydroxide/simethicone Suspension 30 milliLiter(s) Oral every 4 hours PRN  melatonin 3 milliGRAM(s) Oral at bedtime PRN  ondansetron Injectable 4 milliGRAM(s) IV Push every 8 hours PRN                                           --------------------------------------------------------------  LABS:                        6.9    8.31  )-----------( 228      ( 03 Nov 2022 06:40 )             20.7     11-03    138  |  98  |  38<H>  ----------------------------<  71  5.0   |  30  |  5.1<HH>    Ca    8.1<L>      03 Nov 2022 06:40  Mg     1.9     11-03    TPro  6.7  /  Alb  2.8<L>  /  TBili  0.3  /  DBili  x   /  AST  10  /  ALT  <5  /  AlkPhos  146<H>  11-03    PT/INR - ( 02 Nov 2022 08:13 )   PT: 12.10 sec;   INR: 1.06 ratio    PTT - ( 02 Nov 2022 08:13 )  PTT:31.6 sec    CARDIAC MARKERS ( 02 Nov 2022 08:13 )  x     / x     / 23 U/L / x     / x        CAPILLARY BLOOD GLUCOSE  POCT Blood Glucose.: 81 mg/dL (03 Nov 2022 11:02)  POCT Blood Glucose.: 85 mg/dL (03 Nov 2022 07:39)  POCT Blood Glucose.: 216 mg/dL (02 Nov 2022 20:56)  POCT Blood Glucose.: 217 mg/dL (02 Nov 2022 16:20)                                            -------------------------------------------------------------  RADIOLOGY:    < from: VA Duplex Lower Ext Vein Scan, Bilat (10.26.22 @ 07:52) >  Impression: Bilateral lower extremity vein mapping with the above measurements.      < from: US Kidney and Bladder (10.25.22 @ 09:06) >  IMPRESSION: No hydronephrosis or calculi. Enlarged prostate. Prevoid volume of approximately 684 mL.      < from: MR Lower Ext Joint w/ IV Cont, Left (10.17.22 @ 14:56) >  IMPRESSION:  Osteomyelitis and erosion of the calcaneus with a vertical pathologic fracture extending to the subtalar joint. Gangrene of overlying subcutaneous tissues. 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                                            --------------------------------------------------------------

## 2022-11-03 NOTE — PROGRESS NOTE ADULT - ASSESSMENT
63 y/o with a PMHx of DM, HTN, ESRD (on dialysis M/W/F, last session on 10/14), CAD s/p 1 stent on 2008 and quadruple CABG on 2012, PAD s/p bilateral angioplasty at [facility], was transferred from [facility] complaining of weakness and unhealed wound in the left foot.  ESRD - HD in am    last Phos noted , cont binders  URINARY retention -  cc, BPH 89 cc (10/25/22)  -cont Flomax,  increase nifedipine to 60 if bp remains elevated   Anemia - on Aranesp 40 weekly, on ABx, no Venofer/ resistant in view of infection- Foot ulcer keep Hb >7 , will need blood tx   PVD -non-healing left foot ulcer s/p LLE angiogram with peroneal angioplasty and atherectomy 10/27/22/ OR today   - on dapto  will follow

## 2022-11-03 NOTE — BRIEF OPERATIVE NOTE - OPERATION/FINDINGS
L foot heel ulcer fibrotic wounds debrided and wash out
preop; left foot osteomyeltitis and left non healing wound   Operation: left lower extremity angiogram, peroneal artery angioplasty and atherectomy
Crumble L calcaneus; soft tissue fibrotic wound, granular healthy postop
Infected left foot ulcer and osteomyelitis; malodorous; pus noted about 5 cc; necrotic, liquified soft tissue noted on left heel

## 2022-11-04 LAB
ALBUMIN SERPL ELPH-MCNC: 3.2 G/DL — LOW (ref 3.5–5.2)
ALP SERPL-CCNC: 150 U/L — HIGH (ref 30–115)
ALT FLD-CCNC: <5 U/L — SIGNIFICANT CHANGE UP (ref 0–41)
ANION GAP SERPL CALC-SCNC: 16 MMOL/L — HIGH (ref 7–14)
AST SERPL-CCNC: 11 U/L — SIGNIFICANT CHANGE UP (ref 0–41)
BILIRUB SERPL-MCNC: 0.4 MG/DL — SIGNIFICANT CHANGE UP (ref 0.2–1.2)
BUN SERPL-MCNC: 53 MG/DL — HIGH (ref 10–20)
CALCIUM SERPL-MCNC: 8.3 MG/DL — LOW (ref 8.4–10.5)
CHLORIDE SERPL-SCNC: 96 MMOL/L — LOW (ref 98–110)
CO2 SERPL-SCNC: 23 MMOL/L — SIGNIFICANT CHANGE UP (ref 17–32)
CREAT SERPL-MCNC: 6.1 MG/DL — CRITICAL HIGH (ref 0.7–1.5)
EGFR: 10 ML/MIN/1.73M2 — LOW
GLUCOSE BLDC GLUCOMTR-MCNC: 254 MG/DL — HIGH (ref 70–99)
GLUCOSE BLDC GLUCOMTR-MCNC: 278 MG/DL — HIGH (ref 70–99)
GLUCOSE BLDC GLUCOMTR-MCNC: 98 MG/DL — SIGNIFICANT CHANGE UP (ref 70–99)
GLUCOSE SERPL-MCNC: 86 MG/DL — SIGNIFICANT CHANGE UP (ref 70–99)
HBV CORE AB SER-ACNC: SIGNIFICANT CHANGE UP
HBV SURFACE AB SER-ACNC: SIGNIFICANT CHANGE UP
HBV SURFACE AG SER-ACNC: SIGNIFICANT CHANGE UP
HCT VFR BLD CALC: 25.1 % — LOW (ref 42–52)
HGB BLD-MCNC: 8.4 G/DL — LOW (ref 14–18)
MAGNESIUM SERPL-MCNC: 2.1 MG/DL — SIGNIFICANT CHANGE UP (ref 1.8–2.4)
MCHC RBC-ENTMCNC: 31.7 PG — HIGH (ref 27–31)
MCHC RBC-ENTMCNC: 33.5 G/DL — SIGNIFICANT CHANGE UP (ref 32–37)
MCV RBC AUTO: 94.7 FL — HIGH (ref 80–94)
NRBC # BLD: 0 /100 WBCS — SIGNIFICANT CHANGE UP (ref 0–0)
PLATELET # BLD AUTO: 244 K/UL — SIGNIFICANT CHANGE UP (ref 130–400)
POTASSIUM SERPL-MCNC: 5.1 MMOL/L — HIGH (ref 3.5–5)
POTASSIUM SERPL-SCNC: 5.1 MMOL/L — HIGH (ref 3.5–5)
PROT SERPL-MCNC: 7.2 G/DL — SIGNIFICANT CHANGE UP (ref 6–8)
RBC # BLD: 2.65 M/UL — LOW (ref 4.7–6.1)
RBC # FLD: 14.8 % — HIGH (ref 11.5–14.5)
SODIUM SERPL-SCNC: 135 MMOL/L — SIGNIFICANT CHANGE UP (ref 135–146)
WBC # BLD: 10.06 K/UL — SIGNIFICANT CHANGE UP (ref 4.8–10.8)
WBC # FLD AUTO: 10.06 K/UL — SIGNIFICANT CHANGE UP (ref 4.8–10.8)

## 2022-11-04 PROCEDURE — 97605 NEG PRS WND THER DME<=50SQCM: CPT | Mod: 58

## 2022-11-04 PROCEDURE — 99233 SBSQ HOSP IP/OBS HIGH 50: CPT

## 2022-11-04 RX ORDER — INSULIN LISPRO 100/ML
6 VIAL (ML) SUBCUTANEOUS ONCE
Refills: 0 | Status: DISCONTINUED | OUTPATIENT
Start: 2022-11-04 | End: 2022-11-08

## 2022-11-04 RX ADMIN — HEPARIN SODIUM 5000 UNIT(S): 5000 INJECTION INTRAVENOUS; SUBCUTANEOUS at 17:04

## 2022-11-04 RX ADMIN — INSULIN GLARGINE 12 UNIT(S): 100 INJECTION, SOLUTION SUBCUTANEOUS at 22:00

## 2022-11-04 RX ADMIN — SEVELAMER CARBONATE 800 MILLIGRAM(S): 2400 POWDER, FOR SUSPENSION ORAL at 21:38

## 2022-11-04 RX ADMIN — Medication 6: at 16:46

## 2022-11-04 RX ADMIN — SENNA PLUS 2 TABLET(S): 8.6 TABLET ORAL at 21:37

## 2022-11-04 RX ADMIN — ATORVASTATIN CALCIUM 40 MILLIGRAM(S): 80 TABLET, FILM COATED ORAL at 21:38

## 2022-11-04 RX ADMIN — Medication 30 MILLIGRAM(S): at 05:23

## 2022-11-04 RX ADMIN — TAMSULOSIN HYDROCHLORIDE 0.4 MILLIGRAM(S): 0.4 CAPSULE ORAL at 21:38

## 2022-11-04 RX ADMIN — POLYETHYLENE GLYCOL 3350 17 GRAM(S): 17 POWDER, FOR SOLUTION ORAL at 05:22

## 2022-11-04 RX ADMIN — SEVELAMER CARBONATE 800 MILLIGRAM(S): 2400 POWDER, FOR SUSPENSION ORAL at 14:24

## 2022-11-04 RX ADMIN — Medication 81 MILLIGRAM(S): at 12:32

## 2022-11-04 RX ADMIN — HEPARIN SODIUM 5000 UNIT(S): 5000 INJECTION INTRAVENOUS; SUBCUTANEOUS at 05:22

## 2022-11-04 RX ADMIN — Medication 40 MICROGRAM(S): at 09:58

## 2022-11-04 RX ADMIN — SEVELAMER CARBONATE 800 MILLIGRAM(S): 2400 POWDER, FOR SUSPENSION ORAL at 05:22

## 2022-11-04 RX ADMIN — POLYETHYLENE GLYCOL 3350 17 GRAM(S): 17 POWDER, FOR SOLUTION ORAL at 17:04

## 2022-11-04 NOTE — PROGRESS NOTE ADULT - ASSESSMENT
61 y/o with a PMHx of DM, HTN, ESRD (on dialysis M/W/F, last session on 10/14), CAD s/p 1 stent on 2008 and quadruple CABG on 2012, PAD s/p bilateral angioplasty at [facility], was transferred from [facility] complaining of weakness and unhealed wound in the left foot.    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 hx of PAD   - IR for angiogram 10/20/22: a) Left lower extremity angiogram demonstrating chronically occluded PT and AT with patent anterior tibial vein bypass reconstituting into the DP. b) No microvascular outflow demonstrates within the calcaneous. No hemodynamically significant stenosis within the left SFA and popliteal artery. No intervention performed  - Blood cx 10/15:NTD   - Wound Cx 9/16 - Enterobacter cloacae  - treated with two weeks post-HD vancomycin + cefepime  - wound CX 10/16 VRE Faecim, Enterobacter cloacae complex, Proteus vulgaris, Morganella morganii   - s/p debridement 10/21 Wound Cx Proteus mirabilis, VRE faecium, Pseudomonas putida  - MRI L foot 10/17: a) Osteomyelitis and erosion of the calcaneus with a vertical pathologic fracture extending to the subtalar joint. Gangrene of overlying subcutaneous tissues. 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  - ID consult appreciated - will likely need at least 6 weeks from last debridement for calcaneal OM  - Cont meropenem 500mg IV daily   - C/w daptomycin 12 mg/kg q 48 hours to cover VRE faecium   - Creatine Kinase, Serum: 15 U/L (10.26.22 @ 06:53)  - Creatine Kinase, Serum: 23 U/L (11.1.22 @ 08:13) -- check CK weekly 2/2 daptomycin use (next check 11/9)  - Podiatry consult:  a) Local Wound Care; Wound Flushed w/ NS; Wound Packed w/ xeroform/ ABD x2 / DSD / Kerlix / Ace bandage  b) Weight Bearing Status; WBAT w/ Heel Touch w/ Surgical Shoe;   c) Continue w/ Local Wound Care; Q24 Dressing Changes;  - S/p Initial debridement 10/21, S/p 10/24/22 excisional debridement of soft tissue and bone of left foot; (pt given 1u pRBCs pre-op)  - S/p 10/27 left lower extremity angiogram, peroneal artery angioplasty and atherectomy 2/ occluded segment found on previous angiogram with Dr. Malik  - Cardio consult 10/26 appreciated patient at high risk for surgery  - S/p 3rd surgical debridement with Podiatry, F/u recs    #Dysuria - r/o UTI - improving   #H/o BPH  - Pt already receiving meropenem - should cover most UTI causing bacteria  - UA 10/23 WBC 13, nitrite negative, LEC small, bacteria negative   - 10/25 urine cx (-)  - 10/25 Kidney & Bladder sonogram (-) for hydronephrosis   -  cc, BPH 89 cc (10/25/22)  - DC fluconazole (10/25-10/28) in case of fungal origin 5 d. course per ID - stopped 10/28 2/ QTC   - EKG 10/25: . --> 10/27 . --> 10/28   - Per nephro d/c phenazopyridine in HD patient  - Started Flomax 0.4mg PO q24h  - Repeat bladder scan (11/1): 17cc.    #HTN  - C/w nifedipine 30mg PO q24h    # ESRD on HD  - HD as per nephrology   - C/w darbepoetin  - C/w Sevelamer 800mg TID    # CAD s/p stent and CABG   - Lipitor 40mg qHS  - Discuss with cardio. we're unable to hold aspirin. Cont aspirin   - holding plavix since 10/16  - Continue BB  - Cardio consult 10/19 appreciated at moderate risk for surgery     # DM II  - Follow FSG  - Lantus 12U qHS, +2 CF insulin sliding scale    # Constipation - resolved   - C/w Miralax, senna daily    #Other  Diet: DASH/TLC, Renal  Activity: Increase as tolerated, LLE toe touch weight bearing with surgical shoe  GI PPX: None  DVT PPX: Heparin 5000U subQ q12h  Dispo: From home, pending debridement revision, final Abx recommendations

## 2022-11-04 NOTE — PROGRESS NOTE ADULT - SUBJECTIVE AND OBJECTIVE BOX
LAWRENCE SCHWABACHER 62y Male  MRN#: [id number]  Hospital Day: 21d    Pt is currently admitted with the primary diagnosis of sepsis POA secondary to diabetic foot ulcer    HPI: 61 y/o with a PMHx of DM, HTN, ESRD (on dialysis M/W/F, last session on 10/14), CAD s/p 1 stent on 2008 and quadruple CABG on 2012, PAD s/p bilateral angioplasty at [facility], was transferred from [facility] complaining of weakness and unhealed wound in the left foot.    SUBJECTIVE  Overnight events: None  Subjective complaints: None. Denies fever, chills, chest pain, shortness of breath, N/V/D. Had debridement revision yesterday. No complaints of foot pain post-procedure.    Present Today:   - Lacey:  No [ x ], Yes [   ] : Indication:     - Type of IV Access:       .. CVC/Piccline:  No [ x ], Yes [   ] : Indication:       .. Midline: No [ x ], Yes [   ] : Indication:                                             ----------------------------------------------------------  OBJECTIVE    VITAL SIGNS: Last 24 Hours  T(C): 36.2 (04 Nov 2022 05:01), Max: 36.7 (03 Nov 2022 12:15)  T(F): 97.1 (04 Nov 2022 05:01), Max: 98.1 (03 Nov 2022 21:01)  HR: 102 (04 Nov 2022 08:30) (92 - 107)  BP: 142/76 (04 Nov 2022 08:30) (142/76 - 158/72)  BP(mean): 109 (03 Nov 2022 12:30) (102 - 109)  RR: 18 (04 Nov 2022 08:30) (18 - 18)  SpO2: 95% (04 Nov 2022 05:01) (95% - 99%)      11-03-22 @ 07:01  -  11-04-22 @ 07:00  --------------------------------------------------------  IN: 335 mL / OUT: 0 mL / NET: 335 mL    PHYSICAL EXAM:  General: Laying in bed. In NAD.  HEENT: Normocephalic, nontraumatic. PERRL.  LUNGS: Clear to auscultation b/l. No wheezes, rales, or rhonchi.  HEART: RRR. No murmurs, rubs, or gallops.  ABDOMEN: Soft, nontender, nondistended. Normoactive bowel sounds.  EXT: Pulses 2+ UE, diminished in RLE, unable to check LLE. No lower extremity edema. Left foot with dressing, intact and clean.  NEURO: AAOx4.  SKIN: Warm, dry.    PAST MEDICAL & SURGICAL HISTORY  CAD (coronary artery disease) 1 stent 2008  S/P CABG (coronary artery bypass graft) x4  Diabetes mellitus  Transient ischemic attack (TIA) 2017; 2008  Chronic kidney disease (CKD) Stage IV  Hypertension  Stented coronary artery in 2008  Myocardial infarction 2012  PAD (peripheral artery disease)  S/p bypass left leg  HLD (hyperlipidemia)  BPH (benign prostatic hyperplasia)  Pain in left knee s/p fall  OA (osteoarthritis)  [place] (hard of hearing)  Chronic anemia  S/P CABG (coronary artery bypass graft) 2012  H/O arterial bypass of lower limb Left Lower Extremity (2016)    History of surgery  Left CEA (2017)  Left Pinkie toe Amputation (2014)  CABG x 4 (2012)  Card cath - stent (2008)  AV fistula 2019  LEFT AV FISTULA                                            -----------------------------------------------------------  ALLERGIES:  No Known Allergies                                            ------------------------------------------------------------    HOME MEDICATIONS  Home Medications:  aspirin 81 mg oral tablet: 1 tab(s) orally once a day (14 Sep 2022 17:42)  furosemide 40 mg oral tablet: 2 tab(s) orally once a day (14 Sep 2022 17:42)  Levemir 100 units/mL subcutaneous solution: 40 unit(s) subcutaneous once a day (at bedtime) (14 Sep 2022 17:42)  Metoprolol Tartrate 50 mg oral tablet: 1 tab(s) orally 2 times a day (14 Sep 2022 17:42)  Plavix 75 mg oral tablet: 1 tab(s) orally once a day (14 Sep 2022 17:42)  sevelamer carbonate 800 mg oral tablet: 1 tab(s) orally 3 times a day (with meals) (20 Sep 2022 11:36)  Trulicity Pen 1.5 mg/0.5 mL subcutaneous solution: 1.5  subcutaneous once a week (14 Sep 2022 17:42)                           MEDICATIONS:  STANDING MEDICATIONS  aspirin enteric coated 81 milliGRAM(s) Oral daily  atorvastatin 40 milliGRAM(s) Oral at bedtime  Dakins Solution - 1/2 Strength 1 Application(s) Topical daily  DAPTOmycin IVPB 950 milliGRAM(s) IV Intermittent every 48 hours  darbepoetin Injectable Syringe 40 MICROGram(s) IV Push <User Schedule>  heparin   Injectable 5000 Unit(s) SubCutaneous every 12 hours  influenza   Vaccine 0.5 milliLiter(s) IntraMuscular once  insulin glargine Injectable (LANTUS) 12 Unit(s) SubCutaneous at bedtime  insulin lispro (ADMELOG) corrective regimen sliding scale   SubCutaneous three times a day before meals  NIFEdipine XL 30 milliGRAM(s) Oral daily  polyethylene glycol 3350 17 Gram(s) Oral two times a day  senna 2 Tablet(s) Oral at bedtime  sevelamer carbonate 800 milliGRAM(s) Oral three times a day  tamsulosin 0.4 milliGRAM(s) Oral at bedtime    PRN MEDICATIONS  acetaminophen     Tablet .. 650 milliGRAM(s) Oral every 6 hours PRN  aluminum hydroxide/magnesium hydroxide/simethicone Suspension 30 milliLiter(s) Oral every 4 hours PRN  melatonin 3 milliGRAM(s) Oral at bedtime PRN  ondansetron Injectable 4 milliGRAM(s) IV Push every 8 hours PRN  oxycodone    5 mG/acetaminophen 325 mG 1 Tablet(s) Oral every 6 hours PRN                                           --------------------------------------------------------------  LABS:                        8.4    10.06 )-----------( 244      ( 04 Nov 2022 08:03 )             25.1     11-04    135  |  96<L>  |  53<H>  ----------------------------<  86  5.1<H>   |  23  |  6.1<HH>    Ca    8.3<L>      04 Nov 2022 08:03  Mg     2.1     11-04    TPro  7.2  /  Alb  3.2<L>  /  TBili  0.4  /  DBili  x   /  AST  11  /  ALT  <5  /  AlkPhos  150<H>  11-04    CAPILLARY BLOOD GLUCOSE  POCT Blood Glucose.: 98 mg/dL (04 Nov 2022 07:39)  POCT Blood Glucose.: 189 mg/dL (03 Nov 2022 21:52)  POCT Blood Glucose.: 210 mg/dL (03 Nov 2022 20:21)  POCT Blood Glucose.: 167 mg/dL (03 Nov 2022 16:42)                                            -------------------------------------------------------------    RADIOLOGY:    < from: VA Duplex Lower Ext Vein Scan, Bilat (10.26.22 @ 07:52) >  Impression: Bilateral lower extremity vein mapping with the above measurements.      < from: US Kidney and Bladder (10.25.22 @ 09:06) >  IMPRESSION: No hydronephrosis or calculi. Enlarged prostate. Prevoid volume of approximately 684 mL.      < from: MR Lower Ext Joint w/ IV Cont, Left (10.17.22 @ 14:56) >  IMPRESSION:  Osteomyelitis and erosion of the calcaneus with a vertical pathologic fracture extending to the subtalar joint. Gangrene of overlying subcutaneous tissues. 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                                            --------------------------------------------------------------

## 2022-11-04 NOTE — PROGRESS NOTE ADULT - ASSESSMENT
61 y/o with a PMHx of DM, HTN, ESRD (on dialysis M/W/F, last session on 10/14), CAD s/p 1 stent on 2008 and quadruple CABG on 2012, PAD s/p bilateral angioplasty at [facility], was transferred from [facility] complaining of weakness and unhealed wound in the left foot.    # Left foot ulcer and Left calcaneous OM complicated by H/o ESBL E coli Bacteremia at [facility]   debridement in am   - c/w  meropenem 500 mg q 24 hours,  daptomycin 12 mg/kg q 48 hours to cover VRE faecium      # ESRD on HD    # Hyponatremia-resolved    # H/o  urinary  retention    # H/o BPH   on flomax    # CAD s/p stent and CABG     #Drug monitoring of high risk medication , potential for drug drug reaction , requiring close monitoring check ck on daptomycin , awaiting repeat ck level     # Hypertension   BP: 142/76 (04 Nov 2022 08:30) (142/76 - 160/73)  controlled    #DM type 2  POCT Blood Glucose.: 98 mg/dL (04 Nov 2022 07:39)  POCT Blood Glucose.: 189 mg/dL (03 Nov 2022 21:52)  POCT Blood Glucose.: 210 mg/dL (03 Nov 2022 20:21)  POCT Blood Glucose.: 167 mg/dL (03 Nov 2022 16:42)  controlled     #Anemia likely due to anemia of chronic disease with no evidence of bleeding ,      # DVT prophylaxis    # Full code    PROGRESS NOTE HANDOFF    Pending:  podiatry follow up , id follow up for duration abx     Family discussion: patient verbalized understanding and agreeable to plan of care     Disposition: Home

## 2022-11-04 NOTE — PROGRESS NOTE ADULT - SUBJECTIVE AND OBJECTIVE BOX
Podiatry Progress Note    Subjective:  SCHWABACHER, LAWRENCE is a  62y Male.   Seen bedside.   Patient is a 62y old  Male who presents with a chief complaint of Sepsis (04 Nov 2022 11:26)      Past Medical History and Surgical History  PAST MEDICAL & SURGICAL HISTORY:  CAD (coronary artery disease)  1 stent 2008      S/P CABG (coronary artery bypass graft)  x4      Diabetes mellitus      Transient ischemic attack (TIA)  2017; 2008      Chronic kidney disease (CKD)  Stage IV      Hypertension      Stented coronary artery  in 2008      Myocardial infarction  2012      PAD (peripheral artery disease)  S/p bypass left leg      HLD (hyperlipidemia)      BPH (benign prostatic hyperplasia)      Pain in left knee  s/p fall      OA (osteoarthritis)      [place] (hard of hearing)      Chronic anemia      S/P CABG (coronary artery bypass graft)  2012      H/O arterial bypass of lower limb  Left Lower Extremity (2016)      History of surgery  Left CEA (2017)  Left Pinkie toe Amputation (2014)  CABG x 4 (2012)  Card cath - stent (2008)        AV fistula  2019  LEFT AV FISTULA           Objective:  Vital Signs Last 24 Hrs  T(C): 36.2 (04 Nov 2022 05:01), Max: 36.7 (03 Nov 2022 21:01)  T(F): 97.1 (04 Nov 2022 05:01), Max: 98.1 (03 Nov 2022 21:01)  HR: 107 (04 Nov 2022 11:30) (101 - 107)  BP: 121/71 (04 Nov 2022 11:30) (121/71 - 158/72)  BP(mean): --  RR: 18 (04 Nov 2022 11:30) (18 - 18)  SpO2: 95% (04 Nov 2022 05:01) (95% - 98%)    Parameters below as of 04 Nov 2022 11:30  Patient On (Oxygen Delivery Method): room air                            8.4    10.06 )-----------( 244      ( 04 Nov 2022 08:03 )             25.1                 11-04    135  |  96<L>  |  53<H>  ----------------------------<  86  5.1<H>   |  23  |  6.1<HH>    Ca    8.3<L>      04 Nov 2022 08:03  Mg     2.1     11-04    TPro  7.2  /  Alb  3.2<L>  /  TBili  0.4  /  DBili  x   /  AST  11  /  ALT  <5  /  AlkPhos  150<H>  11-04    #Left Lower Extremity  Derm:   Open Left Plantar Ulcer @ Plantar Medial Rearfoot;    -Wound Base Fibrogranular; 50% Fibrous, 50% Granular; moderate serosanguinous drainage    -Wound Margins Irregular    - measures 7.0 x 5.5 x 2.4cm;   Mild Cellulitis w/ Erythema of Left Lower Extremity;  Noted mild increase in periwound erythema  Vascular: DP and PT Pulses Diminished; Foot to Warm to the touch; Capillary Refill Delayed to the Toes;  Neuro: Protective Sensation Diminished / Moderately Neuropathic   MSK: Mild Charcot Deformity Left Foot; No Pain On Palpation at Wound Site     Assessment:  S/p Excisional Debridement of Soft Tissue & Bone, Left Foot; (DOS: 10/21/22)   s/p exc dbx st/b Left foot 10/24/22  S/p exc dbx st/b Left foot 11/3/22    Plan:  Chart reviewed and Patient evaluated. All Questions and Concerns Addressed and Answered  Local Wound Care; Wound Flushed w/ NS; Wound Packed w/ adaptic/ DSD/Kerlix/ACE  Weight Bearing Status; WBAT w/ toe touch w/surgical shoe left foot  Wound vac applied to surgical site left foot at a constant pressure of 125mmHg with no leaks/fully sealed.  Continue with wound vac application; dressed with Kerlix and ace bandage  Podiatry will monitor through the weekend for any wound vac changes.  No further surgery indicated at this time;  Discussed Plan w/ Attending Dr. Garvin     Podiatry [phone]

## 2022-11-04 NOTE — PROGRESS NOTE ADULT - SUBJECTIVE AND OBJECTIVE BOX
Nephrology progress note    Patient is seen and examined, events over the last 24 h noted .    Allergies:  No Known Allergies    Hospital Medications:   MEDICATIONS  (STANDING):  aspirin enteric coated 81 milliGRAM(s) Oral daily  atorvastatin 40 milliGRAM(s) Oral at bedtime  Dakins Solution - 1/2 Strength 1 Application(s) Topical daily  DAPTOmycin IVPB 950 milliGRAM(s) IV Intermittent every 48 hours  darbepoetin Injectable Syringe 40 MICROGram(s) IV Push <User Schedule>  heparin   Injectable 5000 Unit(s) SubCutaneous every 12 hours  influenza   Vaccine 0.5 milliLiter(s) IntraMuscular once  insulin glargine Injectable (LANTUS) 12 Unit(s) SubCutaneous at bedtime  insulin lispro (ADMELOG) corrective regimen sliding scale   SubCutaneous three times a day before meals  NIFEdipine XL 30 milliGRAM(s) Oral daily  polyethylene glycol 3350 17 Gram(s) Oral two times a day  senna 2 Tablet(s) Oral at bedtime  sevelamer carbonate 800 milliGRAM(s) Oral three times a day  tamsulosin 0.4 milliGRAM(s) Oral at bedtime        VITALS:  T(F): 97.1 (11-04-22 @ 05:01), Max: 98.1 (11-03-22 @ 21:01)  HR: 107 (11-04-22 @ 05:01)  BP: 146/77 (11-04-22 @ 05:01)  RR: 18 (11-04-22 @ 05:01)  SpO2: 95% (11-04-22 @ 05:01)  Wt(kg): --    11-02 @ 07:01  -  11-03 @ 07:00  --------------------------------------------------------  IN: 435 mL / OUT: 3000 mL / NET: -2565 mL    11-03 @ 07:01  -  11-04 @ 07:00  --------------------------------------------------------  IN: 335 mL / OUT: 0 mL / NET: 335 mL      Height (cm): 175.3 (11-03 @ 11:21)  Weight (kg): 90.1 (11-03 @ 11:21)  BMI (kg/m2): 29.3 (11-03 @ 11:21)  BSA (m2): 2.06 (11-03 @ 11:21)    PHYSICAL EXAM:  Constitutional: NAD  Neck: No JVD  Respiratory: CTAB, no wheezes, rales or rhonchi  Cardiovascular: S1, S2, RRR  Gastrointestinal: BS+, soft, NT/ND  Extremities: Lt foot bandaged No peripheral edema  Neurological: A/O x 3  : No CVA tenderness. No schneider.   Skin: No rashes  Vascular Access: AVF    LABS:  11-03    138  |  98  |  38<H>  ----------------------------<  71  5.0   |  30  |  5.1<HH>    Ca    8.1<L>      03 Nov 2022 06:40  Mg     1.9     11-03    TPro  6.7  /  Alb  2.8<L>  /  TBili  0.3  /  DBili      /  AST  10  /  ALT  <5  /  AlkPhos  146<H>  11-03                          8.1    8.85  )-----------( 242      ( 03 Nov 2022 11:28 )             24.6       Urine Studies:      RADIOLOGY & ADDITIONAL STUDIES:

## 2022-11-04 NOTE — PROGRESS NOTE ADULT - ASSESSMENT
63 y/o with a PMHx of DM, HTN, ESRD (on dialysis M/W/F, last session on 10/14), CAD s/p 1 stent on 2008 and quadruple CABG on 2012, PAD s/p bilateral angioplasty at [facility], was transferred from [facility] complaining of weakness and unhealed wound in the left foot.  ESRD - HD today 3 hrs 2 K bath  UF 3 L   last Phos noted , cont binders  URINARY retention -  cc, BPH 89 cc (10/25/22)   - Repeat bladder scan (11/1): 17cc.  -cont Flomax,  increase nifedipine to 60 if bp remains elevated   Anemia - on Aranesp 40 weekly, on ABx, no Venofer/ resistant in view of infection- Foot ulcer keep Hb >7 , will need blood tx   PVD -non-healing left foot ulcer s/p LLE angiogram with peroneal angioplasty and atherectomy 10/27/22  11/3/22	Surgical bone culture of left calcaneus    will follow

## 2022-11-04 NOTE — PROGRESS NOTE ADULT - SUBJECTIVE AND OBJECTIVE BOX
SCHWABACHER, LAWRENCE  62y  [facility] [id number]      Patient is a 62y old  Male who presents with a chief complaint of Sepsis (04 Nov 2022 08:29)      INTERVAL HPI/OVERNIGHT EVENTS:    no acute events overnight     REVIEW OF SYSTEMS:  CONSTITUTIONAL: No fever, weight loss, or fatigue  EYES: No eye pain, visual disturbances, or discharge  ENMT:  No difficulty hearing, tinnitus, vertigo; No sinus or throat pain  NECK: No pain or stiffness  BREASTS: No pain, masses, or nipple discharge  RESPIRATORY: No cough, wheezing, chills or hemoptysis; No shortness of breath  CARDIOVASCULAR: No chest pain, palpitations, dizziness, or leg swelling  GASTROINTESTINAL: No abdominal or epigastric pain. No nausea, vomiting, or hematemesis; No diarrhea or constipation. No melena or hematochezia.  GENITOURINARY: No dysuria, frequency, hematuria, or incontinence  NEUROLOGICAL: No headaches, memory loss, loss of strength, numbness, or tremors  SKIN: No itching, burning, rashes, or lesions   LYMPH NODES: No enlarged glands  ENDOCRINE: No heat or cold intolerance; No hair loss  MUSCULOSKELETAL: No joint pain or swelling; No muscle, back, or extremity pain  PSYCHIATRIC: No depression, anxiety, mood swings, or difficulty sleeping  HEME/LYMPH: No easy bruising, or bleeding gums  ALLERY AND IMMUNOLOGIC: No hives or eczema  FAMILY HISTORY:  Family history of heart disease (Father)    DM (diabetes mellitus) (Mother)    ESRD (end stage renal disease) on dialysis (Mother)      T(C): 36.2 (11-04-22 @ 05:01), Max: 36.7 (11-03-22 @ 12:15)  HR: 102 (11-04-22 @ 08:30) (92 - 107)  BP: 142/76 (11-04-22 @ 08:30) (142/76 - 160/73)  RR: 18 (11-04-22 @ 08:30) (17 - 18)  SpO2: 95% (11-04-22 @ 05:01) (95% - 100%)  Wt(kg): --Vital Signs Last 24 Hrs  T(C): 36.2 (04 Nov 2022 05:01), Max: 36.7 (03 Nov 2022 12:15)  T(F): 97.1 (04 Nov 2022 05:01), Max: 98.1 (03 Nov 2022 21:01)  HR: 102 (04 Nov 2022 08:30) (92 - 107)  BP: 142/76 (04 Nov 2022 08:30) (142/76 - 160/73)  BP(mean): 109 (03 Nov 2022 12:30) (102 - 113)  RR: 18 (04 Nov 2022 08:30) (17 - 18)  SpO2: 95% (04 Nov 2022 05:01) (95% - 100%)    Parameters below as of 04 Nov 2022 08:30  Patient On (Oxygen Delivery Method): room air        PHYSICAL EXAM:  GENERAL: NAD, well-groomed, well-developed  HEAD:  Atraumatic, Normocephalic  EYES: EOMI, PERRLA, conjunctiva and sclera clear  ENMT: No tonsillar erythema, exudates, or enlargement; Moist mucous membranes, Good dentition, No lesions  NECK: Supple, No JVD, Normal thyroid  NERVOUS SYSTEM:  Alert & Oriented X3, Good concentration; Motor Strength 5/5 B/L upper and lower extremities; DTRs 2+ intact and symmetric  PULM: Clear to auscultation bilaterally  CARDIAC: Regular rate and rhythm; No murmurs, rubs, or gallops  GI: Soft, Nontender, Nondistended; Bowel sounds present  EXTREMITIES:  left foot wrapped   LYMPH: No lymphadenopathy noted  SKIN: No rashes or lesions    Consultant(s) Notes Reviewed:  [x ] YES  [ ] NO  Care Discussed with Consultants/Other Providers [ x] YES  [ ] NO    LABS:                            8.4    10.06 )-----------( 244      ( 04 Nov 2022 08:03 )             25.1   11-04    135  |  96<L>  |  53<H>  ----------------------------<  86  5.1<H>   |  23  |  6.1<HH>    Ca    8.3<L>      04 Nov 2022 08:03  Mg     2.1     11-04    TPro  7.2  /  Alb  3.2<L>  /  TBili  0.4  /  DBili  x   /  AST  11  /  ALT  <5  /  AlkPhos  150<H>  11-04            acetaminophen     Tablet .. 650 milliGRAM(s) Oral every 6 hours PRN  aluminum hydroxide/magnesium hydroxide/simethicone Suspension 30 milliLiter(s) Oral every 4 hours PRN  aspirin enteric coated 81 milliGRAM(s) Oral daily  atorvastatin 40 milliGRAM(s) Oral at bedtime  Dakins Solution - 1/2 Strength 1 Application(s) Topical daily  DAPTOmycin IVPB 950 milliGRAM(s) IV Intermittent every 48 hours  darbepoetin Injectable Syringe 40 MICROGram(s) IV Push <User Schedule>  heparin   Injectable 5000 Unit(s) SubCutaneous every 12 hours  influenza   Vaccine 0.5 milliLiter(s) IntraMuscular once  insulin glargine Injectable (LANTUS) 12 Unit(s) SubCutaneous at bedtime  insulin lispro (ADMELOG) corrective regimen sliding scale   SubCutaneous three times a day before meals  melatonin 3 milliGRAM(s) Oral at bedtime PRN  NIFEdipine XL 30 milliGRAM(s) Oral daily  ondansetron Injectable 4 milliGRAM(s) IV Push every 8 hours PRN  oxycodone    5 mG/acetaminophen 325 mG 1 Tablet(s) Oral every 6 hours PRN  polyethylene glycol 3350 17 Gram(s) Oral two times a day  senna 2 Tablet(s) Oral at bedtime  sevelamer carbonate 800 milliGRAM(s) Oral three times a day  tamsulosin 0.4 milliGRAM(s) Oral at bedtime      HEALTH ISSUES - PROBLEM Dx:          Case Discussed with House Staff       Spectra [phone]

## 2022-11-05 ENCOUNTER — TRANSCRIPTION ENCOUNTER (OUTPATIENT)
Age: 62
End: 2022-11-05

## 2022-11-05 LAB
ALBUMIN SERPL ELPH-MCNC: 2.7 G/DL — LOW (ref 3.5–5.2)
ALP SERPL-CCNC: 143 U/L — HIGH (ref 30–115)
ALT FLD-CCNC: <5 U/L — SIGNIFICANT CHANGE UP (ref 0–41)
ANION GAP SERPL CALC-SCNC: 12 MMOL/L — SIGNIFICANT CHANGE UP (ref 7–14)
AST SERPL-CCNC: 11 U/L — SIGNIFICANT CHANGE UP (ref 0–41)
BILIRUB SERPL-MCNC: 0.4 MG/DL — SIGNIFICANT CHANGE UP (ref 0.2–1.2)
BUN SERPL-MCNC: 41 MG/DL — HIGH (ref 10–20)
CALCIUM SERPL-MCNC: 8.1 MG/DL — LOW (ref 8.4–10.5)
CHLORIDE SERPL-SCNC: 100 MMOL/L — SIGNIFICANT CHANGE UP (ref 98–110)
CO2 SERPL-SCNC: 29 MMOL/L — SIGNIFICANT CHANGE UP (ref 17–32)
CREAT SERPL-MCNC: 5.5 MG/DL — CRITICAL HIGH (ref 0.7–1.5)
CULTURE RESULTS: NO GROWTH — SIGNIFICANT CHANGE UP
EGFR: 11 ML/MIN/1.73M2 — LOW
GLUCOSE BLDC GLUCOMTR-MCNC: 128 MG/DL — HIGH (ref 70–99)
GLUCOSE BLDC GLUCOMTR-MCNC: 147 MG/DL — HIGH (ref 70–99)
GLUCOSE BLDC GLUCOMTR-MCNC: 192 MG/DL — HIGH (ref 70–99)
GLUCOSE BLDC GLUCOMTR-MCNC: 248 MG/DL — HIGH (ref 70–99)
GLUCOSE SERPL-MCNC: 78 MG/DL — SIGNIFICANT CHANGE UP (ref 70–99)
HCT VFR BLD CALC: 20.8 % — LOW (ref 42–52)
HGB BLD-MCNC: 7.2 G/DL — LOW (ref 14–18)
MAGNESIUM SERPL-MCNC: 2 MG/DL — SIGNIFICANT CHANGE UP (ref 1.8–2.4)
MCHC RBC-ENTMCNC: 32 PG — HIGH (ref 27–31)
MCHC RBC-ENTMCNC: 34.6 G/DL — SIGNIFICANT CHANGE UP (ref 32–37)
MCV RBC AUTO: 92.4 FL — SIGNIFICANT CHANGE UP (ref 80–94)
NRBC # BLD: 0 /100 WBCS — SIGNIFICANT CHANGE UP (ref 0–0)
PLATELET # BLD AUTO: 210 K/UL — SIGNIFICANT CHANGE UP (ref 130–400)
POTASSIUM SERPL-MCNC: 4.4 MMOL/L — SIGNIFICANT CHANGE UP (ref 3.5–5)
POTASSIUM SERPL-SCNC: 4.4 MMOL/L — SIGNIFICANT CHANGE UP (ref 3.5–5)
PROT SERPL-MCNC: 6.6 G/DL — SIGNIFICANT CHANGE UP (ref 6–8)
RBC # BLD: 2.25 M/UL — LOW (ref 4.7–6.1)
RBC # FLD: 14.7 % — HIGH (ref 11.5–14.5)
SODIUM SERPL-SCNC: 141 MMOL/L — SIGNIFICANT CHANGE UP (ref 135–146)
SPECIMEN SOURCE: SIGNIFICANT CHANGE UP
WBC # BLD: 7.39 K/UL — SIGNIFICANT CHANGE UP (ref 4.8–10.8)
WBC # FLD AUTO: 7.39 K/UL — SIGNIFICANT CHANGE UP (ref 4.8–10.8)

## 2022-11-05 PROCEDURE — 99232 SBSQ HOSP IP/OBS MODERATE 35: CPT | Mod: 24

## 2022-11-05 PROCEDURE — 99232 SBSQ HOSP IP/OBS MODERATE 35: CPT

## 2022-11-05 RX ADMIN — POLYETHYLENE GLYCOL 3350 17 GRAM(S): 17 POWDER, FOR SOLUTION ORAL at 05:39

## 2022-11-05 RX ADMIN — Medication 30 MILLIGRAM(S): at 05:38

## 2022-11-05 RX ADMIN — HEPARIN SODIUM 5000 UNIT(S): 5000 INJECTION INTRAVENOUS; SUBCUTANEOUS at 05:39

## 2022-11-05 RX ADMIN — INSULIN GLARGINE 12 UNIT(S): 100 INJECTION, SOLUTION SUBCUTANEOUS at 21:23

## 2022-11-05 RX ADMIN — SEVELAMER CARBONATE 800 MILLIGRAM(S): 2400 POWDER, FOR SUSPENSION ORAL at 15:14

## 2022-11-05 RX ADMIN — Medication 4: at 16:47

## 2022-11-05 RX ADMIN — HEPARIN SODIUM 5000 UNIT(S): 5000 INJECTION INTRAVENOUS; SUBCUTANEOUS at 17:53

## 2022-11-05 RX ADMIN — Medication 81 MILLIGRAM(S): at 15:14

## 2022-11-05 RX ADMIN — DAPTOMYCIN 138 MILLIGRAM(S): 500 INJECTION, POWDER, LYOPHILIZED, FOR SOLUTION INTRAVENOUS at 16:48

## 2022-11-05 RX ADMIN — SEVELAMER CARBONATE 800 MILLIGRAM(S): 2400 POWDER, FOR SUSPENSION ORAL at 21:23

## 2022-11-05 RX ADMIN — SENNA PLUS 2 TABLET(S): 8.6 TABLET ORAL at 21:23

## 2022-11-05 RX ADMIN — ATORVASTATIN CALCIUM 40 MILLIGRAM(S): 80 TABLET, FILM COATED ORAL at 21:23

## 2022-11-05 RX ADMIN — TAMSULOSIN HYDROCHLORIDE 0.4 MILLIGRAM(S): 0.4 CAPSULE ORAL at 21:23

## 2022-11-05 RX ADMIN — SEVELAMER CARBONATE 800 MILLIGRAM(S): 2400 POWDER, FOR SUSPENSION ORAL at 05:38

## 2022-11-05 NOTE — DISCHARGE NOTE PROVIDER - NSDCCPCAREPLAN_GEN_ALL_CORE_FT
PRINCIPAL DISCHARGE DIAGNOSIS  Diagnosis: Diabetic foot ulcer  Assessment and Plan of Treatment: Diabetes is a chronic condition caused by high blood sugar levels. Your blood sugar levels become high because your body does not have enough insulin. Insulin helps move sugar out of the blood so it can be used for energy. Increased sugar in your blood can cause problems in several organs of your body including but not limited to your blood vessels, your kidneys, your brain, and your eyes. The lack of insulin forces your body to use fat instead of sugar for energy. This can be dangerous if not controlled.  Local wound care; Wound Flush w/ saline: wound pack w/ adaptic/ DSD/Kerlix/ACE. Weight bearing status weight bearing as tolerated w/ toe touch w/surgical shoe left foot. Wound vac applied to surgical site left foot at a constant pressure of 125mmHg with no leaks/fully sealed. Continue with wound vac application; dress with Kerlix and ace bandage.       PRINCIPAL DISCHARGE DIAGNOSIS  Diagnosis: Diabetic foot ulcer  Assessment and Plan of Treatment: Diabetes is a chronic condition caused by high blood sugar levels. Your blood sugar levels become high because your body does not have enough insulin. Insulin helps move sugar out of the blood so it can be used for energy. Increased sugar in your blood can cause problems in several organs of your body including but not limited to your blood vessels, your kidneys, your brain, and your eyes. The lack of insulin forces your body to use fat instead of sugar for energy. This can be dangerous if not controlled.  Local wound care; Wound Flush w/ saline: wound pack w/ adaptic/ DSD/Kerlix/ACE. Weight bearing status weight bearing as tolerated w/ toe touch w/surgical shoe left foot. Wound vac applied to surgical site left foot at a constant pressure of 125mmHg with no leaks/fully sealed. Continue with wound vac application; dress with Kerlix and ace bandage.  - ID recs post-discharge: switch to cefepime 2g post HD and daptomycin 10 mg/kg post HD x first two sessions of the week and 12 mg/kg post HD on third session,  Weekly CBC, CMP, ESR/CRP, and CK measurements while on Dapto. Will need antibiotics for 4 weeks since last debridement (11/3-11/30)  - ID follow-up with Dr. Gomez Mathur for Telehealth. ID will call the patient between 10:30-1:30.

## 2022-11-05 NOTE — PROGRESS NOTE ADULT - SUBJECTIVE AND OBJECTIVE BOX
Podiatry Progress Note    Subjective:  SCHWABACHER, LAWRENCE is a  62y Male.   Seen bedside.   Patient is a 62y old  Male who presents with a chief complaint of Sepsis (05 Nov 2022 11:56)      Past Medical History and Surgical History  PAST MEDICAL & SURGICAL HISTORY:  CAD (coronary artery disease)  1 stent 2008      S/P CABG (coronary artery bypass graft)  x4      Diabetes mellitus      Transient ischemic attack (TIA)  2017; 2008      Chronic kidney disease (CKD)  Stage IV      Hypertension      Stented coronary artery  in 2008      Myocardial infarction  2012      PAD (peripheral artery disease)  S/p bypass left leg      HLD (hyperlipidemia)      BPH (benign prostatic hyperplasia)      Pain in left knee  s/p fall      OA (osteoarthritis)      [place] (hard of hearing)      Chronic anemia      S/P CABG (coronary artery bypass graft)  2012      H/O arterial bypass of lower limb  Left Lower Extremity (2016)      History of surgery  Left CEA (2017)  Left Pinkie toe Amputation (2014)  CABG x 4 (2012)  Card cath - stent (2008)        AV fistula  2019  LEFT AV FISTULA           Objective:  Vital Signs Last 24 Hrs  T(C): 36.7 (05 Nov 2022 05:06), Max: 37.2 (04 Nov 2022 21:37)  T(F): 98 (05 Nov 2022 05:06), Max: 99 (04 Nov 2022 21:37)  HR: 101 (05 Nov 2022 05:06) (101 - 112)  BP: 163/81 (05 Nov 2022 05:06) (128/67 - 163/81)  BP(mean): --  RR: 18 (05 Nov 2022 05:06) (18 - 18)  SpO2: --                            7.2    7.39  )-----------( 210      ( 05 Nov 2022 07:30 )             20.8                 11-05    141  |  100  |  41<H>  ----------------------------<  78  4.4   |  29  |  5.5<HH>    Ca    8.1<L>      05 Nov 2022 07:30  Mg     2.0     11-05    TPro  6.6  /  Alb  2.7<L>  /  TBili  0.4  /  DBili  x   /  AST  11  /  ALT  <5  /  AlkPhos  143<H>  11-05        #Left Lower Extremity  Derm:   Open Left Plantar Ulcer @ Plantar Medial Rearfoot;    -Wound Base Fibrogranular; 50% Fibrous, 50% Granular; moderate serosanguinous drainage    -Wound Margins Irregular    - measures 7.0 x 5.5 x 2.4cm;   Mild Cellulitis w/ Erythema of Left Lower Extremity;  Noted mild increase in periwound erythema  Vascular: DP and PT Pulses Diminished; Foot to Warm to the touch; Capillary Refill Delayed to the Toes;  Neuro: Protective Sensation Diminished / Moderately Neuropathic   MSK: Mild Charcot Deformity Left Foot; No Pain On Palpation at Wound Site     Assessment:  S/p Excisional Debridement of Soft Tissue & Bone, Left Foot; (DOS: 10/21/22)   s/p exc dbx st/b Left foot 10/24/22  S/p exc dbx st/b Left foot 11/3/22    Plan:  Chart reviewed and Patient evaluated. All Questions and Concerns Addressed and Answered  Local Wound Care; Wound Flushed w/ NS; Wound Packed w/ adaptic/ DSD/Kerlix/ACE  Weight Bearing Status; WBAT w/ toe touch w/surgical shoe left foot  Wound vac at surgical site left foot at a constant pressure of 125mmHg with no leaks/fully sealed. <5cc of drainage in wound vac today.   Continue with wound vac application; dressed with Kerlix and ace bandage  Paperwork for at home wound vac is in patients chart;  Patient is stable per Podiatry standpoint;  Patient is to continue with wound vac at home with VNS to change wound vac 3x per week.  Patient may shower but is to keep left foot dressing/wound vac clean/dry/intact; wear bag over foot for showers.  No further surgery indicated at this time;  Patient may follow up as outpatient with his preferred Podiatrist Dr. Nikolai Pacheco 1 week post-dsc;   Discussed Plan w/ Attending Dr. Garvin     Podiatry [phone]

## 2022-11-05 NOTE — PROGRESS NOTE ADULT - TIME BILLING
I have personally seen and examined this patient.    I have reviewed all pertinent clinical information and reviewed all relevant imaging and diagnostic studies personally.   I counseled the patient about diagnostic testing and treatment plan. All questions were answered.   I discussed recommendations with the primary team.
I have personally seen and examined this patient.    I have reviewed all pertinent clinical information and reviewed all relevant imaging and diagnostic studies personally.   I counseled the patient about diagnostic testing and treatment plan. All questions were answered.   I discussed recommendations with the primary team.
direct patient care. Discussed on rounds with Housestaff.
I have personally seen and examined this patient.    I have reviewed all pertinent clinical information and reviewed all relevant imaging and diagnostic studies personally.   I counseled the patient about diagnostic testing and treatment plan. All questions were answered.   I discussed recommendations with the primary team.
Direct patient care. Discussed on rounds with Housestaff
I have personally seen and examined this patient.    I have reviewed all pertinent clinical information and reviewed all relevant imaging and diagnostic studies personally.   I counseled the patient about diagnostic testing and treatment plan. All questions were answered.   I discussed recommendations with the primary team.

## 2022-11-05 NOTE — PROGRESS NOTE ADULT - SUBJECTIVE AND OBJECTIVE BOX
LAWRENCE SCHWABACHER 62y Male  MRN#: [id number]  Hospital Day: 22d    Pt is currently admitted with the primary diagnosis of sepsis POA secondary to diabetic foot ulcer    HPI: 63 y/o with a PMHx of DM, HTN, ESRD (on dialysis M/W/F, last session on 10/14), CAD s/p 1 stent on 2008 and quadruple CABG on 2012, PAD s/p bilateral angioplasty at [facility], was transferred from [facility] complaining of weakness and unhealed wound in the left foot.    SUBJECTIVE  Overnight events:  Subjective complaints:    Present Today:   - Lacey:  No [  ], Yes [   ] : Indication:     - Type of IV Access:       .. CVC/Piccline:  No [  ], Yes [   ] : Indication:       .. Midline: No [  ], Yes [   ] : Indication:                                             ----------------------------------------------------------  OBJECTIVE    VITAL SIGNS: Last 24 Hours  T(C): 36.7 (05 Nov 2022 05:06), Max: 37.2 (04 Nov 2022 21:37)  T(F): 98 (05 Nov 2022 05:06), Max: 99 (04 Nov 2022 21:37)  HR: 101 (05 Nov 2022 05:06) (101 - 112)  BP: 163/81 (05 Nov 2022 05:06) (121/71 - 163/81)  BP(mean): --  RR: 18 (05 Nov 2022 05:06) (18 - 18)  SpO2: --      11-04-22 @ 07:01  -  11-05-22 @ 07:00  --------------------------------------------------------  IN: 0 mL / OUT: 3000 mL / NET: -3000 mL        PHYSICAL EXAM:  General: well-appearing in NAD.  HEENT: Normocephalic, nontraumatic. PERRL.  LUNGS: Clear to auscultation b/l. No wheezes, rales, or rhonchi.  HEART: RRR. No murmurs, rubs, or gallops.  ABDOMEN: Soft, nontender, nondistended. + bowel sounds.  EXT: Pulses palpable x 4. No lower extremity edema.  NEURO: AAOx4.  SKIN: Warm, dry.    PAST MEDICAL & SURGICAL HISTORY  CAD (coronary artery disease)  1 stent 2008    S/P CABG (coronary artery bypass graft)  x4    Diabetes mellitus    Transient ischemic attack (TIA)  2017; 2008    Chronic kidney disease (CKD)  Stage IV    Hypertension    Stented coronary artery  in 2008    Myocardial infarction  2012    PAD (peripheral artery disease)  S/p bypass left leg    HLD (hyperlipidemia)    BPH (benign prostatic hyperplasia)    Pain in left knee  s/p fall    OA (osteoarthritis)    [place] (hard of hearing)    Chronic anemia    S/P CABG (coronary artery bypass graft)  2012    H/O arterial bypass of lower limb  Left Lower Extremity (2016)    History of surgery  Left CEA (2017)  Left Pinkie toe Amputation (2014)  CABG x 4 (2012)  Card cath - stent (2008)      AV fistula  2019  LEFT AV FISTULA                                              -----------------------------------------------------------  ALLERGIES:  No Known Allergies                                            ------------------------------------------------------------    HOME MEDICATIONS  Home Medications:  aspirin 81 mg oral tablet: 1 tab(s) orally once a day (14 Sep 2022 17:42)  furosemide 40 mg oral tablet: 2 tab(s) orally once a day (14 Sep 2022 17:42)  Levemir 100 units/mL subcutaneous solution: 40 unit(s) subcutaneous once a day (at bedtime) (14 Sep 2022 17:42)  Metoprolol Tartrate 50 mg oral tablet: 1 tab(s) orally 2 times a day (14 Sep 2022 17:42)  Plavix 75 mg oral tablet: 1 tab(s) orally once a day (14 Sep 2022 17:42)  sevelamer carbonate 800 mg oral tablet: 1 tab(s) orally 3 times a day (with meals) (20 Sep 2022 11:36)  Trulicity Pen 1.5 mg/0.5 mL subcutaneous solution: 1.5  subcutaneous once a week (14 Sep 2022 17:42)                           MEDICATIONS:  STANDING MEDICATIONS  aspirin enteric coated 81 milliGRAM(s) Oral daily  atorvastatin 40 milliGRAM(s) Oral at bedtime  Dakins Solution - 1/2 Strength 1 Application(s) Topical daily  DAPTOmycin IVPB 950 milliGRAM(s) IV Intermittent every 48 hours  darbepoetin Injectable Syringe 40 MICROGram(s) IV Push <User Schedule>  heparin   Injectable 5000 Unit(s) SubCutaneous every 12 hours  influenza   Vaccine 0.5 milliLiter(s) IntraMuscular once  insulin glargine Injectable (LANTUS) 12 Unit(s) SubCutaneous at bedtime  insulin lispro (ADMELOG) corrective regimen sliding scale   SubCutaneous three times a day before meals  insulin lispro Injectable (ADMELOG). 6 Unit(s) SubCutaneous once  NIFEdipine XL 30 milliGRAM(s) Oral daily  polyethylene glycol 3350 17 Gram(s) Oral two times a day  senna 2 Tablet(s) Oral at bedtime  sevelamer carbonate 800 milliGRAM(s) Oral three times a day  tamsulosin 0.4 milliGRAM(s) Oral at bedtime    PRN MEDICATIONS  acetaminophen     Tablet .. 650 milliGRAM(s) Oral every 6 hours PRN  aluminum hydroxide/magnesium hydroxide/simethicone Suspension 30 milliLiter(s) Oral every 4 hours PRN  melatonin 3 milliGRAM(s) Oral at bedtime PRN  ondansetron Injectable 4 milliGRAM(s) IV Push every 8 hours PRN  oxycodone    5 mG/acetaminophen 325 mG 1 Tablet(s) Oral every 6 hours PRN                                            ------------------------------------------------------------                                           --------------------------------------------------------------  LABS:                        7.2    7.39  )-----------( 210      ( 05 Nov 2022 07:30 )             20.8     11-05    141  |  100  |  41<H>  ----------------------------<  78  4.4   |  29  |  x     Ca    8.1<L>      05 Nov 2022 07:30  Mg     2.0     11-05    TPro  6.6  /  Alb  2.7<L>  /  TBili  0.4  /  DBili  x   /  AST  11  /  ALT  x   /  AlkPhos  143<H>  11-05    Culture - Other (collected 03 Nov 2022 11:50)  Source: .Other None  Preliminary Report (04 Nov 2022 21:09):    No growth                                            -------------------------------------------------------------  RADIOLOGY:                                            --------------------------------------------------------------               LAWRENCE SCHWABACHER 62y Male  MRN#: [id number]  Hospital Day: 22d    Pt is currently admitted with the primary diagnosis of sepsis POA secondary to diabetic foot ulcer    HPI: 63 y/o with a PMHx of DM, HTN, ESRD (on dialysis M/W/F, last session on 10/14), CAD s/p 1 stent on 2008 and quadruple CABG on 2012, PAD s/p bilateral angioplasty at [facility], was transferred from [facility] complaining of weakness and unhealed wound in the left foot.    SUBJECTIVE  Overnight events: None  Subjective complaints: None, denies any foot pain. Also denies fever, chills, headache, chest pain, sob, n/v/d.    Present Today:   - Lacey:  No [ x ], Yes [   ] : Indication:     - Type of IV Access:       .. CVC/Piccline:  No [ x ], Yes [   ] : Indication:       .. Midline: No [ x ], Yes [   ] : Indication:                                             ----------------------------------------------------------  OBJECTIVE    VITAL SIGNS: Last 24 Hours  T(C): 36.7 (05 Nov 2022 05:06), Max: 37.2 (04 Nov 2022 21:37)  T(F): 98 (05 Nov 2022 05:06), Max: 99 (04 Nov 2022 21:37)  HR: 101 (05 Nov 2022 05:06) (101 - 112)  BP: 163/81 (05 Nov 2022 05:06) (121/71 - 163/81)  BP(mean): --  RR: 18 (05 Nov 2022 05:06) (18 - 18)  SpO2: --    11-04-22 @ 07:01  -  11-05-22 @ 07:00  --------------------------------------------------------  IN: 0 mL / OUT: 3000 mL / NET: -3000 mL    PHYSICAL EXAM:  General: well-appearing in NAD.  HEENT: Normocephalic, nontraumatic. PERRL.  LUNGS: Clear to auscultation b/l. No wheezes, rales, or rhonchi.  HEART: RRR. No murmurs, rubs, or gallops.  ABDOMEN: Soft, nontender, nondistended. + bowel sounds.  EXT: Pulses palpable x 4. No lower extremity edema.  NEURO: AAOx4.  SKIN: Warm, dry.    PAST MEDICAL & SURGICAL HISTORY  CAD (coronary artery disease) 1 stent 2008  S/P CABG (coronary artery bypass graft) x4  Diabetes mellitus  Transient ischemic attack (TIA) 2017; 2008  Chronic kidney disease (CKD) Stage IV  Hypertension  Stented coronary artery in 2008  Myocardial infarction 2012  PAD (peripheral artery disease)  S/p bypass left leg  HLD (hyperlipidemia)  BPH (benign prostatic hyperplasia)  Pain in left knee s/p fall  OA (osteoarthritis)  [place] (hard of hearing)  Chronic anemia  S/P CABG (coronary artery bypass graft) 2012  H/O arterial bypass of lower limb Left Lower Extremity (2016)    History of surgery  Left CEA (2017)  Left Pinkie toe Amputation (2014)  CABG x 4 (2012)  Card cath - stent (2008)  AV fistula 2019  LEFT AV FISTULA                                            -----------------------------------------------------------  ALLERGIES:  No Known Allergies                                            ------------------------------------------------------------    HOME MEDICATIONS  Home Medications:  aspirin 81 mg oral tablet: 1 tab(s) orally once a day (14 Sep 2022 17:42)  furosemide 40 mg oral tablet: 2 tab(s) orally once a day (14 Sep 2022 17:42)  Levemir 100 units/mL subcutaneous solution: 40 unit(s) subcutaneous once a day (at bedtime) (14 Sep 2022 17:42)  Metoprolol Tartrate 50 mg oral tablet: 1 tab(s) orally 2 times a day (14 Sep 2022 17:42)  Plavix 75 mg oral tablet: 1 tab(s) orally once a day (14 Sep 2022 17:42)  sevelamer carbonate 800 mg oral tablet: 1 tab(s) orally 3 times a day (with meals) (20 Sep 2022 11:36)  Trulicity Pen 1.5 mg/0.5 mL subcutaneous solution: 1.5  subcutaneous once a week (14 Sep 2022 17:42)                           MEDICATIONS:  STANDING MEDICATIONS  aspirin enteric coated 81 milliGRAM(s) Oral daily  atorvastatin 40 milliGRAM(s) Oral at bedtime  Dakins Solution - 1/2 Strength 1 Application(s) Topical daily  DAPTOmycin IVPB 950 milliGRAM(s) IV Intermittent every 48 hours  darbepoetin Injectable Syringe 40 MICROGram(s) IV Push <User Schedule>  heparin   Injectable 5000 Unit(s) SubCutaneous every 12 hours  influenza   Vaccine 0.5 milliLiter(s) IntraMuscular once  insulin glargine Injectable (LANTUS) 12 Unit(s) SubCutaneous at bedtime  insulin lispro (ADMELOG) corrective regimen sliding scale   SubCutaneous three times a day before meals  insulin lispro Injectable (ADMELOG). 6 Unit(s) SubCutaneous once  NIFEdipine XL 30 milliGRAM(s) Oral daily  polyethylene glycol 3350 17 Gram(s) Oral two times a day  senna 2 Tablet(s) Oral at bedtime  sevelamer carbonate 800 milliGRAM(s) Oral three times a day  tamsulosin 0.4 milliGRAM(s) Oral at bedtime    PRN MEDICATIONS  acetaminophen     Tablet .. 650 milliGRAM(s) Oral every 6 hours PRN  aluminum hydroxide/magnesium hydroxide/simethicone Suspension 30 milliLiter(s) Oral every 4 hours PRN  melatonin 3 milliGRAM(s) Oral at bedtime PRN  ondansetron Injectable 4 milliGRAM(s) IV Push every 8 hours PRN  oxycodone    5 mG/acetaminophen 325 mG 1 Tablet(s) Oral every 6 hours PRN                                           --------------------------------------------------------------  LABS:                        7.2    7.39  )-----------( 210      ( 05 Nov 2022 07:30 )             20.8     11-05    141  |  100  |  41<H>  ----------------------------<  78  4.4   |  29  |  x     Ca    8.1<L>      05 Nov 2022 07:30  Mg     2.0     11-05    TPro  6.6  /  Alb  2.7<L>  /  TBili  0.4  /  DBili  x   /  AST  11  /  ALT  x   /  AlkPhos  143<H>  11-05    Culture - Other (collected 03 Nov 2022 11:50)  Source: .Other None  Preliminary Report (04 Nov 2022 21:09):    No growth                                            -------------------------------------------------------------  RADIOLOGY:    < from: [facility] Duplex Lower Ext Vein Scan, Bilat (10.26.22 @ 07:52) >  Impression: Bilateral lower extremity vein mapping with the above measurements.      < from: US Kidney and Bladder (10.25.22 @ 09:06) >  IMPRESSION: No hydronephrosis or calculi. Enlarged prostate. Prevoid volume of approximately 684 mL.      < from: MR Lower Ext Joint w/ IV Cont, Left (10.17.22 @ 14:56) >  IMPRESSION:  Osteomyelitis and erosion of the calcaneus with a vertical pathologic fracture extending to the subtalar joint. Gangrene of overlying subcutaneous tissues. 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                                            --------------------------------------------------------------

## 2022-11-05 NOTE — DISCHARGE NOTE PROVIDER - HOSPITAL COURSE
HPI: 61 y/o with a PMHx of DM, HTN, ESRD (on dialysis M/W/F, last session on 10/14), CAD s/p 1 stent on 2008 and quadruple CABG on 2012, PAD s/p bilateral angioplasty at [facility], was transferred from [facility] complaining of weakness and unhealed wound in the left foot.    Two months ago the patient inadvertently stepped on glass while cleaning his house injuring his left foot. His podiatrist removed glass fragments and sent him home on a week long course of antibiotics (patient unsure about which ABx). According to the patient the wound failed to improve, he progressively developed weakness on his legs, and recurring vomiting, which prompted him to go to [facility]'s ED this last Tuesday 10/11. At [facility] patient was found to be septic and was started on IV Vancomycin 1000mg and Meropenem 500mg. Blood cultures grew ESBL E.Coli. An MRI of the foot showed "osteomyelitis of the calcaneus bone, with what appears to be a subacute fracture". Patient was evaluated by vascular surgery and podiatry at [facility], who recommended debriding the L foot wound. Patient requested to be transferred to [facility] where his vascular surgeons are. Patient's DC paperwork available in his chart.   HPI: 63 y/o with a PMHx of DM, HTN, ESRD (on dialysis M/W/F, last session on 10/14), CAD s/p 1 stent on 2008 and quadruple CABG on 2012, PAD s/p bilateral angioplasty at [facility], was transferred from [facility] complaining of weakness and unhealed wound in the left foot. Two months ago the patient inadvertently stepped on glass while cleaning his house injuring his left foot. His podiatrist removed glass fragments and sent him home on a week long course of antibiotics (patient unsure about which ABx). According to the patient the wound failed to improve, he progressively developed weakness on his legs, and recurring vomiting, which prompted him to go to [facility]'s ED this last Tuesday 10/11. At [facility] patient was found to be septic and was started on IV Vancomycin 1000mg and Meropenem 500mg. Blood cultures grew ESBL E.Coli. An MRI of the foot showed "osteomyelitis of the calcaneus bone, with what appears to be a subacute fracture". Patient was evaluated by vascular surgery and podiatry at [facility], who recommended debriding the L foot wound. Patient requested to be transferred to [facility] where his vascular surgeons are. Patient's DC paperwork available in his chart.    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 hx of PAD   - IR for angiogram 10/20/22: a) Left lower extremity angiogram demonstrating chronically occluded PT and AT with patent anterior tibial vein bypass reconstituting into the DP. b) No microvascular outflow demonstrates within the calcaneous. No hemodynamically significant stenosis within the left SFA and popliteal artery. No intervention performed  - Blood cx 10/15:NTD   - Wound Cx 9/16 - Enterobacter cloacae  - treated with two weeks post-HD vancomycin + cefepime  - wound CX 10/16 VRE Faecim, Enterobacter cloacae complex, Proteus vulgaris, Morganella morganii   - s/p debridement 10/21 Wound Cx Proteus mirabilis, VRE faecium, Pseudomonas putida  - MRI L foot 10/17: a) Osteomyelitis and erosion of the calcaneus with a vertical pathologic fracture extending to the subtalar joint. Gangrene of overlying subcutaneous tissues. 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  - ID consult appreciated - will likely need at least 6 weeks from last debridement for calcaneal OM   - C/w daptomycin 12 mg/kg q 48 hours to cover VRE faecium   - Creatine Kinase, Serum: 15 U/L (10.26.22 @ 06:53)  - Creatine Kinase, Serum: 23 U/L (11.1.22 @ 08:13) -- check CK weekly 2/2 daptomycin use (next check 11/9)  - Podiatry consult:  a) Local Wound Care; Wound Flushed w/ NS; Wound Packed w/ xeroform/ ABD x2 / DSD / Kerlix / Ace bandage  b) Weight Bearing Status; WBAT w/ Heel Touch w/ Surgical Shoe;   c) Continue w/ Local Wound Care; Q24 Dressing Changes;  - S/p Initial debridement 10/21, S/p 10/24/22 excisional debridement of soft tissue and bone of left foot; (pt given 1u pRBCs pre-op)  - S/p 10/27 left lower extremity angiogram, peroneal artery angioplasty and atherectomy 2/ occluded segment found on previous angiogram with Dr. Malik  - Cardio consult 10/26 appreciated patient at high risk for surgery  - S/p 3rd surgical debridement with Podiatry  - ID recs post-discharge: switch to cefepime 2g post HD and daptomycin 10 mg/kg post HD x first two sessions of the week and 12 mg/kg post HD on third session,  Weekly CBC, CMP, ESR/CRP, and CK measurements while on Dapto. Will need antibiotics for 4 weeks since last debridement (11/3-11/30)  - ID follow-up with Dr. Gomez Mathur for Telehealth. ID will call the patient between 10:30-1:30.    #Dysuria - r/o UTI - improving   #H/o BPH  - UA 10/23 WBC 13, nitrite negative, LEC small, bacteria negative   - 10/25 urine cx (-)  - 10/25 Kidney & Bladder sonogram (-) for hydronephrosis   -  cc, BPH 89 cc (10/25/22)  - DC fluconazole (10/25-10/28) in case of fungal origin 5 d. course per ID - stopped 10/28 2/ QTC   - EKG 10/25: . --> 10/27 . --> 10/28   - Per nephro d/c phenazopyridine in HD patient  - Started Flomax 0.4mg PO q24h  - Repeat bladder scan (11/1): 17cc.    #HTN  - C/w nifedipine 30mg PO q24h    # ESRD on HD  - HD as per nephrology   - C/w darbepoetin  - C/w Sevelamer 800mg TID    # CAD s/p stent and CABG   - Lipitor 40mg qHS  - Discuss with cardio. we're unable to hold aspirin. Cont aspirin   - holding plavix since 10/16  - Continue BB  - Cardio consult 10/19 appreciated at moderate risk for surgery     # DM II  - Follow FSG  - Lantus 12U qHS, +2 CF insulin sliding scale    # Constipation - resolved   - C/w Miralax, senna daily HPI: 61 y/o with a PMHx of DM, HTN, ESRD (on dialysis M/W/F, last session on 10/14), CAD s/p 1 stent on 2008 and quadruple CABG on 2012, PAD s/p bilateral angioplasty at [facility], was transferred from [facility] complaining of weakness and unhealed wound in the left foot. Two months ago the patient inadvertently stepped on glass while cleaning his house injuring his left foot. His podiatrist removed glass fragments and sent him home on a week long course of antibiotics (patient unsure about which ABx). According to the patient the wound failed to improve, he progressively developed weakness on his legs, and recurring vomiting, which prompted him to go to [facility] ED this last Tuesday 10/11. At [facility] patient was found to be septic and was started on IV Vancomycin 1000mg and Meropenem 500mg. Blood cultures grew ESBL E.Coli. An MRI of the foot showed "osteomyelitis of the calcaneus bone, with what appears to be a subacute fracture". Patient was evaluated by vascular surgery and podiatry at [facility], who recommended debriding the L foot wound. Patient requested to be transferred to [facility] where his vascular surgeons are. Patient's DC paperwork available in his chart.    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 hx of PAD   - IR for angiogram 10/20/22: a) Left lower extremity angiogram demonstrating chronically occluded PT and AT with patent anterior tibial vein bypass reconstituting into the DP. b) No microvascular outflow demonstrates within the calcaneous. No hemodynamically significant stenosis within the left SFA and popliteal artery. No intervention performed  - Blood cx 10/15:NTD   - Wound Cx 9/16 - Enterobacter cloacae  - treated with two weeks post-HD vancomycin + cefepime  - wound CX 10/16 VRE Faecim, Enterobacter cloacae complex, Proteus vulgaris, Morganella morganii   - s/p debridement 10/21 Wound Cx Proteus mirabilis, VRE faecium, Pseudomonas putida  - MRI L foot 10/17: a) Osteomyelitis and erosion of the calcaneus with a vertical pathologic fracture extending to the subtalar joint. Gangrene of overlying subcutaneous tissues. 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  - ID consult appreciated - will likely need at least 6 weeks from last debridement for calcaneal OM   - C/w daptomycin 12 mg/kg q 48 hours to cover VRE faecium   - Creatine Kinase, Serum: 15 U/L (10.26.22 @ 06:53)  - Creatine Kinase, Serum: 23 U/L (11.1.22 @ 08:13) -- check CK weekly 2/2 daptomycin use (next check 11/9)  - Podiatry consult:  a) Local Wound Care; Wound Flushed w/ NS; Wound Packed w/ xeroform/ ABD x2 / DSD / Kerlix / Ace bandage  b) Weight Bearing Status; WBAT w/ Heel Touch w/ Surgical Shoe;   c) Continue w/ Local Wound Care; Q24 Dressing Changes;  - S/p Initial debridement 10/21, S/p 10/24/22 excisional debridement of soft tissue and bone of left foot; (pt given 1u pRBCs pre-op)  - S/p 10/27 left lower extremity angiogram, peroneal artery angioplasty and atherectomy 2/ occluded segment found on previous angiogram with Dr. Malik  - Cardio consult 10/26 appreciated patient at high risk for surgery  - S/p 3rd surgical debridement with Podiatry (11/3)  - ID recs post-discharge: switch to cefepime 2g post HD and daptomycin 10 mg/kg post HD x first two sessions of the week and 12 mg/kg post HD on third session,  Weekly CBC, CMP, ESR/CRP, and CK measurements while on Dapto. Will need antibiotics for 4 weeks since last debridement (11/3-11/30)  - ID follow-up with Dr. Gomez Mathur for Telehealth. ID will call the patient between 10:30-1:30.    #Dysuria - r/o UTI - improving   #H/o BPH  - UA 10/23 WBC 13, nitrite negative, LEC small, bacteria negative   - 10/25 urine cx (-)  - 10/25 Kidney & Bladder sonogram (-) for hydronephrosis   -  cc, BPH 89 cc (10/25/22)  - DC fluconazole (10/25-10/28) in case of fungal origin 5 d. course per ID - stopped 10/28 2/ QTC   - EKG 10/25: . --> 10/27 . --> 10/28   - Per nephro d/c phenazopyridine in HD patient  - Started Flomax 0.4mg PO q24h  - Repeat bladder scan (11/1): 17cc.    #HTN  - C/w nifedipine 30mg PO q24h    # ESRD on HD  - HD as per nephrology   - C/w darbepoetin  - C/w Sevelamer 800mg TID    # CAD s/p stent and CABG   - Lipitor 40mg qHS  - Discuss with cardio. we're unable to hold aspirin. Cont aspirin   - holding plavix since 10/16  - Continue BB  - Cardio consult 10/19 appreciated at moderate risk for surgery     # DM II  - Follow FSG  - Lantus 12U qHS, +2 CF insulin sliding scale    # Constipation - resolved   - C/w Miralax, senna daily HPI: 63 y/o with a PMHx of DM, HTN, ESRD (on dialysis M/W/F, last session on 10/14), CAD s/p 1 stent on 2008 and quadruple CABG on 2012, PAD s/p bilateral angioplasty at [facility], was transferred from [facility] complaining of weakness and unhealed wound in the left foot. Two months ago the patient inadvertently stepped on glass while cleaning his house injuring his left foot. His podiatrist removed glass fragments and sent him home on a week long course of antibiotics (patient unsure about which ABx). According to the patient the wound failed to improve, he progressively developed weakness on his legs, and recurring vomiting, which prompted him to go to [facility]'s ED this last Tuesday 10/11. At [facility] patient was found to be septic and was started on IV Vancomycin 1000mg and Meropenem 500mg. Blood cultures grew ESBL E.Coli. An MRI of the foot showed "osteomyelitis of the calcaneus bone, with what appears to be a subacute fracture". Patient was evaluated by vascular surgery and podiatry at [facility], who recommended debriding the L foot wound. Patient requested to be transferred to [facility] where his vascular surgeons are. Patient's DC paperwork available in his chart.    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 hx of PAD   - IR for angiogram 10/20/22: a) Left lower extremity angiogram demonstrating chronically occluded PT and AT with patent anterior tibial vein bypass reconstituting into the DP. b) No microvascular outflow demonstrates within the calcaneous. No hemodynamically significant stenosis within the left SFA and popliteal artery. No intervention performed  - Blood cx 10/15:NTD   - Wound Cx 9/16 - Enterobacter cloacae  - treated with two weeks post-HD vancomycin + cefepime  - wound CX 10/16 VRE Faecim, Enterobacter cloacae complex, Proteus vulgaris, Morganella morganii   - s/p debridement 10/21 Wound Cx Proteus mirabilis, VRE faecium, Pseudomonas putida  - MRI L foot 10/17: a) Osteomyelitis and erosion of the calcaneus with a vertical pathologic fracture extending to the subtalar joint. Gangrene of overlying subcutaneous tissues. 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  - ID consult appreciated - will likely need at least 6 weeks from last debridement for calcaneal OM   - C/w daptomycin 12 mg/kg q 48 hours to cover VRE faecium   - Creatine Kinase, Serum: 15 U/L (10.26.22 @ 06:53)  - Creatine Kinase, Serum: 23 U/L (11.1.22 @ 08:13) -- check CK weekly 2/2 daptomycin use (next check 11/9)  - Podiatry consult:  a) Local Wound Care; Wound Flushed w/ NS; Wound Packed w/ xeroform/ ABD x2 / DSD / Kerlix / Ace bandage  b) Weight Bearing Status; WBAT w/ Heel Touch w/ Surgical Shoe;   c) Continue w/ Local Wound Care; Q24 Dressing Changes;  - S/p Initial debridement 10/21, S/p 10/24/22 excisional debridement of soft tissue and bone of left foot; (pt given 1u pRBCs pre-op)  - S/p 10/27 left lower extremity angiogram, peroneal artery angioplasty and atherectomy 2/ occluded segment found on previous angiogram with Dr. Malik  - Cardio consult 10/26 appreciated patient at high risk for surgery  - S/p 3rd surgical debridement with Podiatry (11/3)  - ID recs post-discharge: switch to cefepime 2g post HD and daptomycin 10 mg/kg post HD x first two sessions of the week and 12 mg/kg post HD on third session,  Weekly CBC, CMP, ESR/CRP, and CK measurements while on Dapto. Will need antibiotics for 4 weeks since last debridement (11/3-11/30)  - ID follow-up with Dr. Gomez Mathur for Telehealth. ID will call the patient between 10:30-1:30.    #Dysuria - r/o UTI - improving   #H/o BPH  - UA 10/23 WBC 13, nitrite negative, LEC small, bacteria negative   - 10/25 urine cx (-)  - 10/25 Kidney & Bladder sonogram (-) for hydronephrosis   -  cc, BPH 89 cc (10/25/22)  - DC fluconazole (10/25-10/28) in case of fungal origin 5 d. course per ID - stopped 10/28 2/ QTC   - EKG 10/25: . --> 10/27 . --> 10/28   - Per nephro d/c phenazopyridine in HD patient  - Started Flomax 0.4mg PO q24h  - Repeat bladder scan (11/1): 17cc.    #HTN  - C/w nifedipine 30mg PO q24h    # ESRD on HD  - HD as per nephrology   - C/w darbepoetin  - C/w Sevelamer 800mg TID    # CAD s/p stent and CABG   - Lipitor 40mg qHS  - Discuss with cardio. we're unable to hold aspirin. Cont aspirin   - holding plavix since 10/16  - Continue BB  - Cardio consult 10/19 appreciated at moderate risk for surgery     # DM II  - Follow FSG  - Lantus 12U qHS, +2 CF insulin sliding scale    # Constipation - resolved   - C/w Miralax, senna daily

## 2022-11-05 NOTE — PROGRESS NOTE ADULT - SUBJECTIVE AND OBJECTIVE BOX
SCHWABACHER, LAWRENCE  62y  [facility] [id number]      Patient is a 62y old  Male who presents with a chief complaint of Sepsis (05 Nov 2022 07:33)      INTERVAL HPI/OVERNIGHT EVENTS:  no events overnight       REVIEW OF SYSTEMS:  CONSTITUTIONAL: No fever, weight loss, or fatigue  EYES: No eye pain, visual disturbances, or discharge  ENMT:  No difficulty hearing, tinnitus, vertigo; No sinus or throat pain  NECK: No pain or stiffness  BREASTS: No pain, masses, or nipple discharge  RESPIRATORY: No cough, wheezing, chills or hemoptysis; No shortness of breath  CARDIOVASCULAR: No chest pain, palpitations, dizziness, or leg swelling  GASTROINTESTINAL: No abdominal or epigastric pain. No nausea, vomiting, or hematemesis; No diarrhea or constipation. No melena or hematochezia.  GENITOURINARY: No dysuria, frequency, hematuria, or incontinence  NEUROLOGICAL: No headaches, memory loss, loss of strength, numbness, or tremors  SKIN: No itching, burning, rashes, or lesions   LYMPH NODES: No enlarged glands  ENDOCRINE: No heat or cold intolerance; No hair loss  MUSCULOSKELETAL: No joint pain or swelling; No muscle, back, or extremity pain  PSYCHIATRIC: No depression, anxiety, mood swings, or difficulty sleeping  HEME/LYMPH: No easy bruising, or bleeding gums  ALLERY AND IMMUNOLOGIC: No hives or eczema  FAMILY HISTORY:  Family history of heart disease (Father)    DM (diabetes mellitus) (Mother)    ESRD (end stage renal disease) on dialysis (Mother)      T(C): 36.7 (11-05-22 @ 05:06), Max: 37.2 (11-04-22 @ 21:37)  HR: 101 (11-05-22 @ 05:06) (101 - 112)  BP: 163/81 (11-05-22 @ 05:06) (121/71 - 163/81)  RR: 18 (11-05-22 @ 05:06) (18 - 18)  SpO2: --  Wt(kg): --Vital Signs Last 24 Hrs  T(C): 36.7 (05 Nov 2022 05:06), Max: 37.2 (04 Nov 2022 21:37)  T(F): 98 (05 Nov 2022 05:06), Max: 99 (04 Nov 2022 21:37)  HR: 101 (05 Nov 2022 05:06) (101 - 112)  BP: 163/81 (05 Nov 2022 05:06) (121/71 - 163/81)  BP(mean): --  RR: 18 (05 Nov 2022 05:06) (18 - 18)  SpO2: --    Parameters below as of 04 Nov 2022 11:30  Patient On (Oxygen Delivery Method): room air        PHYSICAL EXAM:  GENERAL: NAD, well-groomed, well-developed  HEAD:  Atraumatic, Normocephalic  EYES: EOMI, PERRLA, conjunctiva and sclera clear  ENMT: No tonsillar erythema, exudates, or enlargement; Moist mucous membranes, Good dentition, No lesions  NECK: Supple, No JVD, Normal thyroid  NERVOUS SYSTEM:  Alert & Oriented X3, Good concentration; Motor Strength 5/5 B/L upper and lower extremities; DTRs 2+ intact and symmetric  PULM: Clear to auscultation bilaterally  CARDIAC: Regular rate and rhythm; No murmurs, rubs, or gallops  GI: Soft, Nontender, Nondistended; Bowel sounds present  EXTREMITIES:  L foot wrapped , +1 lower extremity edema   LYMPH: No lymphadenopathy noted  SKIN: No rashes or lesions    Consultant(s) Notes Reviewed:  [x ] YES  [ ] NO  Care Discussed with Consultants/Other Providers [ x] YES  [ ] NO    LABS:                            7.2    7.39  )-----------( 210      ( 05 Nov 2022 07:30 )             20.8   11-05    141  |  100  |  41<H>  ----------------------------<  78  4.4   |  29  |  x     Ca    8.1<L>      05 Nov 2022 07:30  Mg     2.0     11-05    TPro  6.6  /  Alb  2.7<L>  /  TBili  0.4  /  DBili  x   /  AST  11  /  ALT  x   /  AlkPhos  143<H>  11-05            Culture - Other (collected 03 Nov 2022 11:50)  Source: .Other None  Preliminary Report (04 Nov 2022 21:09):    No growth      acetaminophen     Tablet .. 650 milliGRAM(s) Oral every 6 hours PRN  aluminum hydroxide/magnesium hydroxide/simethicone Suspension 30 milliLiter(s) Oral every 4 hours PRN  aspirin enteric coated 81 milliGRAM(s) Oral daily  atorvastatin 40 milliGRAM(s) Oral at bedtime  Dakins Solution - 1/2 Strength 1 Application(s) Topical daily  DAPTOmycin IVPB 950 milliGRAM(s) IV Intermittent every 48 hours  darbepoetin Injectable Syringe 40 MICROGram(s) IV Push <User Schedule>  heparin   Injectable 5000 Unit(s) SubCutaneous every 12 hours  influenza   Vaccine 0.5 milliLiter(s) IntraMuscular once  insulin glargine Injectable (LANTUS) 12 Unit(s) SubCutaneous at bedtime  insulin lispro (ADMELOG) corrective regimen sliding scale   SubCutaneous three times a day before meals  insulin lispro Injectable (ADMELOG). 6 Unit(s) SubCutaneous once  melatonin 3 milliGRAM(s) Oral at bedtime PRN  NIFEdipine XL 30 milliGRAM(s) Oral daily  ondansetron Injectable 4 milliGRAM(s) IV Push every 8 hours PRN  oxycodone    5 mG/acetaminophen 325 mG 1 Tablet(s) Oral every 6 hours PRN  polyethylene glycol 3350 17 Gram(s) Oral two times a day  senna 2 Tablet(s) Oral at bedtime  sevelamer carbonate 800 milliGRAM(s) Oral three times a day  tamsulosin 0.4 milliGRAM(s) Oral at bedtime      HEALTH ISSUES - PROBLEM Dx:          Case Discussed with House Staff      Spectra [phone]

## 2022-11-05 NOTE — DISCHARGE NOTE PROVIDER - NSFOLLOWUPCLINICS_GEN_ALL_ED_FT
[facility]  Podiatry  .  [place]   Phone: [phone]  Fax:   Follow Up Time: 1-3 days

## 2022-11-05 NOTE — DISCHARGE NOTE PROVIDER - CARE PROVIDER_API CALL
Orlando Sandoval)  [address] [facility]  [address] [place] [address]  Phone: [phone]  Fax: [phone]  Follow Up Time:

## 2022-11-05 NOTE — DISCHARGE NOTE PROVIDER - NSDCMRMEDTOKEN_GEN_ALL_CORE_FT
amLODIPine 10 mg oral tablet: 1 tab(s) orally once a day   aspirin 81 mg oral tablet: 1 tab(s) orally once a day  atorvastatin 40 mg oral tablet: 1 tab(s) orally once a day   cefepime 1 g/50 mL-iso-osmotic dextrose intravenous solution: 2 gram(s) intravenously Monday, Wednesday, and Friday post HD on HD days  furosemide 40 mg oral tablet: 2 tab(s) orally once a day  Levemir 100 units/mL subcutaneous solution: 40 unit(s) subcutaneous once a day (at bedtime)  Metoprolol Tartrate 50 mg oral tablet: 1 tab(s) orally 2 times a day  Plavix 75 mg oral tablet: 1 tab(s) orally once a day  sevelamer carbonate 800 mg oral tablet: 1 tab(s) orally 3 times a day (with meals)  Trulicity Pen 1.5 mg/0.5 mL subcutaneous solution: 1.5  subcutaneous once a week   amLODIPine 10 mg oral tablet: 1 tab(s) orally once a day   aspirin 81 mg oral tablet: 1 tab(s) orally once a day  atorvastatin 40 mg oral tablet: 1 tab(s) orally once a day   cefepime 1 g/50 mL-iso-osmotic dextrose intravenous solution: 2 gram(s) intravenously Monday, Wednesday, and Friday post HD on HD days  furosemide 40 mg oral tablet: 2 tab(s) orally once a day  Levemir 100 units/mL subcutaneous solution: 40 unit(s) subcutaneous once a day (at bedtime)  NIFEdipine 30 mg oral tablet, extended release: 1 tab(s) orally once a day  Plavix 75 mg oral tablet: 1 tab(s) orally once a day  sevelamer carbonate 800 mg oral tablet: 1 tab(s) orally 3 times a day (with meals)  tamsulosin 0.4 mg oral capsule: 1 cap(s) orally once a day (at bedtime)  Trulicity Pen 1.5 mg/0.5 mL subcutaneous solution: 1.5  subcutaneous once a week   aspirin 81 mg oral tablet: 1 tab(s) orally once a day  atorvastatin 40 mg oral tablet: 1 tab(s) orally once a day   furosemide 40 mg oral tablet: 2 tab(s) orally once a day  Levemir 100 units/mL subcutaneous solution: 40 unit(s) subcutaneous once a day (at bedtime)  NIFEdipine 30 mg oral tablet, extended release: 1 tab(s) orally once a day  Plavix 75 mg oral tablet: 1 tab(s) orally once a day  sevelamer carbonate 800 mg oral tablet: 1 tab(s) orally 3 times a day (with meals)  tamsulosin 0.4 mg oral capsule: 1 cap(s) orally once a day (at bedtime)  Trulicity Pen 1.5 mg/0.5 mL subcutaneous solution: 1.5  subcutaneous once a week

## 2022-11-05 NOTE — PROGRESS NOTE ADULT - ASSESSMENT
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    IMPRESSION  #Left Calcaneous OM  - s/p removal of FB 9/16  - Wound Cx 9/16 - Enterobacter cloacae  - treated with two weeks post-HD vancomycin + cefepime  - wound CX 10/16 VRE Faecim, Enterobacter cloacae complex, Proteus vulgaris, Morganella morganii   - s/p debridement 10/21 Wound Cx Proteus mirabilis, VRE faecium, Pseudomonas putida  - s/p debridement 10/24 Crumble L alcaenous - granular healthy post   - s/p debridement 11/3     #PAD  - s/p angiogram LLE with peroneal artery angioplasty and artherectom 10/27    #Dysuria   - Urine Cx 10/25 NG -- collected after fluconazole     #ESBL E coli Bacteremia at [facility]     #ESRD on HD  #CAD s/p CABG  #PAD s/p bilateral angioplasty  #DM  #Abx allergy:      RECOMMENDATIONS  - on discharge, switch to cefepime 2g post HD and daptomycin 10 mg/kg post HD x first two sessions of the week and 12 mg/kg post HD on third session   - check CK weekly - Creatine Kinase, Serum: 23 U/L (11.02.22 @ 08:13)  - plan for 4 weeks from last debridement (11/3-11/30)  - repeat ESR/CRP with next routine labs    - Weekly CBC, CMP, ESR/CRP, weekly CK   - ID follow-up with Dr. Gomez Mathur for Telehealth. We will call the patient between 10:30-1:30      [address]       [phone]       Fax [phone]      Please call or message on Microsoft Teams if with any questions.  Spectra [phone]       ASSESSMENT  63 y/o with a PMHx of DM, HTN, ESRD (on dialysis M/W/F, last session on 10/14), CAD s/p 1 stent on 2008 and quadruple CABG on 2012, PAD s/p bilateral angioplasty at 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    IMPRESSION  #Left Calcaneous OM  - s/p removal of FB 9/16  - Wound Cx 9/16 - Enterobacter cloacae  - treated with two weeks post-HD vancomycin + cefepime  - wound CX 10/16 VRE Faecim, Enterobacter cloacae complex, Proteus vulgaris, Morganella morganii   - s/p debridement 10/21 Wound Cx Proteus mirabilis, VRE faecium, Pseudomonas putida  - s/p debridement 10/24 Crumble L alcaenous - granular healthy post   - s/p debridement 11/3 with wound vac in place    #PAD  - s/p angiogram LLE with peroneal artery angioplasty and artherectom 10/27    #Dysuria   - Urine Cx 10/25 NG -- collected after fluconazole     #ESBL E coli Bacteremia at [facility]     #ESRD on HD  #CAD s/p CABG  #PAD s/p bilateral angioplasty  #DM  #Abx allergy:      RECOMMENDATIONS  - on discharge, switch to cefepime 2g post HD and daptomycin 10 mg/kg post HD x first two sessions of the week and 12 mg/kg post HD on third session   - check CK weekly - Creatine Kinase, Serum: 23 U/L (11.02.22 @ 08:13)  - plan for 4 weeks from last debridement (11/3-11/30)  - repeat ESR/CRP with next routine labs    - Weekly CBC, CMP, ESR/CRP, weekly CK   - ID follow-up with Dr. Gomez Mathur for Telehealth. We will call the patient between 10:30-1:30      [address]       [phone]       Fax [phone]      Please call or message on Microsoft Teams if with any questions.  Spectra [phone]

## 2022-11-05 NOTE — DISCHARGE NOTE PROVIDER - NSDCFUADDINST_GEN_ALL_CORE_FT
Follow-up with Podiatry next week. -     Follow-up with Dr. Gomez Britton (Infectious Disease) Tuesdays for Telehealth. They will call you. between 10:30-1:30.

## 2022-11-05 NOTE — PROGRESS NOTE ADULT - ASSESSMENT
61 y/o with a PMHx of DM, HTN, ESRD (on dialysis M/W/F, last session on 10/14), CAD s/p 1 stent on 2008 and quadruple CABG on 2012, PAD s/p bilateral angioplasty at [facility], was transferred from [facility] complaining of weakness and unhealed wound in the left foot.    # Left foot ulcer and Left calcaneous OM complicated by H/o ESBL E coli Bacteremia at [facility]   sp debridement   - on discharge, switch to cefepime 2g post HD and daptomycin 10 mg/kg post HD x first two sessions of the week and 12 mg/kg post HD on third session      # ESRD on HD    # Hyponatremia-resolved    # H/o  urinary  retention    # H/o BPH   on flomax    # CAD s/p stent and CABG     #Drug monitoring of high risk medication , potential for drug drug reaction , requiring close monitoring check ck on weekly basis     # Hypertension   BP: 163/81 (05 Nov 2022 05:06) (121/71 - 163/81)  last reading elevated     #DM type 2  CAPILLARY BLOOD GLUCOSE      POCT Blood Glucose.: 128 mg/dL (05 Nov 2022 08:14)  POCT Blood Glucose.: 254 mg/dL (04 Nov 2022 21:41)  POCT Blood Glucose.: 278 mg/dL (04 Nov 2022 16:39)    controlled     #Anemia likely due to anemia of chronic disease with no evidence of bleeding ,      # DVT prophylaxis    # Full code    PROGRESS NOTE HANDOFF    Pending:  dc planning  with wound vac     Family discussion: patient verbalized understanding and agreeable to plan of care     Disposition: Home

## 2022-11-05 NOTE — PROGRESS NOTE ADULT - SUBJECTIVE AND OBJECTIVE BOX
SCHWABACHER, LAWRENCE  62y, Male  Allergy: No Known Allergies      LOS  22d    CHIEF COMPLAINT: Sepsis (04 Nov 2022 15:43)      INTERVAL EVENTS/HPI  - No acute events overnight  - T(F): , Max: 99 (11-04-22 @ 21:37)  - Denies any worsening symptoms  - Tolerating medication  - WBC Count: 10.06 (11-04-22 @ 08:03)  WBC Count: 8.85 (11-03-22 @ 11:28)     - Creatinine, Serum: 6.1 (11-04-22 @ 08:03)       ROS  General: Denies rigors, nightsweats  HEENT: Denies headache, rhinorrhea, sore throat, eye pain  CV: Denies CP, palpitations  PULM: Denies wheezing, hemoptysis  GI: Denies hematemesis, hematochezia, melena  : Denies discharge, hematuria  MSK: Denies arthralgias, myalgias  SKIN: Denies rash, lesions  NEURO: Denies paresthesias, weakness  PSYCH: Denies depression, anxiety    VITALS:  T(F): 98, Max: 99 (11-04-22 @ 21:37)  HR: 101  BP: 163/81  RR: 18Vital Signs Last 24 Hrs  T(C): 36.7 (05 Nov 2022 05:06), Max: 37.2 (04 Nov 2022 21:37)  T(F): 98 (05 Nov 2022 05:06), Max: 99 (04 Nov 2022 21:37)  HR: 101 (05 Nov 2022 05:06) (101 - 112)  BP: 163/81 (05 Nov 2022 05:06) (121/71 - 163/81)  BP(mean): --  RR: 18 (05 Nov 2022 05:06) (18 - 18)  SpO2: --    Parameters below as of 04 Nov 2022 11:30  Patient On (Oxygen Delivery Method): room air        PHYSICAL EXAM:  Gen: NAD, resting in bed  HEENT: Normocephalic, atraumatic  Neck: supple, no lymphadenopathy  CV: Regular rate & regular rhythm  Lungs: decreased BS at bases, no fremitus  Abdomen: Soft, BS present  Ext: Warm, well perfused  Neuro: non focal, awake  Skin: Left Heel with wound vac  Lines: no phlebitis    FH: Non-contributory  Social Hx: Non-contributory    TESTS & MEASUREMENTS:                        8.4    10.06 )-----------( 244      ( 04 Nov 2022 08:03 )             25.1     11-04    135  |  96<L>  |  53<H>  ----------------------------<  86  5.1<H>   |  23  |  6.1<HH>    Ca    8.3<L>      04 Nov 2022 08:03  Mg     2.1     11-04    TPro  7.2  /  Alb  3.2<L>  /  TBili  0.4  /  DBili  x   /  AST  11  /  ALT  <5  /  AlkPhos  150<H>  11-04      LIVER FUNCTIONS - ( 04 Nov 2022 08:03 )  Alb: 3.2 g/dL / Pro: 7.2 g/dL / ALK PHOS: 150 U/L / ALT: <5 U/L / AST: 11 U/L / GGT: x               Culture - Other (collected 11-03-22 @ 11:50)  Source: .Other None  Preliminary Report (11-04-22 @ 21:09):    No growth    Culture - Urine (collected 10-25-22 @ 20:46)  Source: Clean Catch Clean Catch (Midstream)  Final Report (10-27-22 @ 00:19):    No growth    Culture - Tissue with Gram Stain (collected 10-21-22 @ 14:05)  Source: .Tissue None  Gram Stain (10-22-22 @ 01:55):    No polymorphonuclear leukocytes seen per low power field    No organisms seen per oil power field  Final Report (10-26-22 @ 15:47):    Moderate Proteus mirabilis    Rare Pseudomonas putida group    Rare Enterococcus faecium (vancomycin resistant)    Rare Staphylococcus haemolyticus  Organism: Proteus mirabilis  Pseudomonas putida group  Enterococcus faecium (vancomycin resistant)  Staphylococcus haemolyticus (10-26-22 @ 15:47)  Organism: Staphylococcus haemolyticus (10-26-22 @ 15:47)      -  Ampicillin/Sulbactam: R 16/8      -  Cefazolin: R >16      -  Clindamycin: R >4      -  Erythromycin: R >4      -  Gentamicin: R >8 Should not be used as monotherapy      -  Oxacillin: R >2      -  Penicillin: R >8      -  Rifampin: R >2 Should not be used as monotherapy      -  Tetra/Doxy: S <=1      -  Trimethoprim/Sulfamethoxazole: R >2/38      -  Vancomycin: S 4      Method Type: CONNER  Organism: Enterococcus faecium (vancomycin resistant) (10-26-22 @ 15:47)      -  Ampicillin: R >8 Predicts results to ampicillin/sulbactam, amoxacillin-clavulanate and  piperacillin-tazobactam.      -  Daptomycin: SDD 4 The breakpoint for SDD (sensitive dose dependent)is based on a dosage regimen of 8-12 mg/kg administered every 24 h in adults and is intended for serious infections due to E. faecium. Consultation with an infectious diseases specialist is recommended.      -  Levofloxacin: R >4      -  Linezolid: S 2      -  Tetra/Doxy: R >8      -  Vancomycin: R >16      Method Type: CONNER  Organism: Pseudomonas putida group (10-26-22 @ 15:47)      -  Amikacin: S <=16      -  Aztreonam: S <=4      -  Cefepime: S <=2      -  Ceftriaxone: S <=1      -  Ciprofloxacin: S <=0.25      -  Gentamicin: S <=2      -  Levofloxacin: S <=0.5      -  Meropenem: S <=1      -  Piperacillin/Tazobactam: S <=8      -  Tobramycin: S <=2      -  Trimethoprim/Sulfamethoxazole: S <=0.5/9.5      Method Type: CONNER  Organism: Proteus mirabilis (10-26-22 @ 15:47)      -  Amikacin: S <=16      -  Amoxicillin/Clavulanic Acid: S <=8/4      -  Ampicillin: S <=8 These ampicillin results predict results for amoxicillin      -  Ampicillin/Sulbactam: S <=4/2 Enterobacter, Klebsiella aerogenes, Citrobacter, and Serratia may develop resistance during prolonged therapy (3-4 days)      -  Aztreonam: S <=4      -  Cefazolin: S <=2 Enterobacter, Klebsiella aerogenes, Citrobacter, and Serratia may develop resistance during prolonged therapy (3-4 days)      -  Cefepime: S <=2      -  Cefoxitin: S <=8      -  Ceftriaxone: S <=1 Enterobacter, Klebsiella aerogenes, Citrobacter, and Serratia may develop resistance during prolonged therapy      -  Ciprofloxacin: S <=0.25      -  Ertapenem: S <=0.5      -  Gentamicin: S <=2      -  Levofloxacin: S <=0.5      -  Meropenem: S <=1      -  Piperacillin/Tazobactam: S <=8      -  Tobramycin: S <=2      -  Trimethoprim/Sulfamethoxazole: S <=0.5/9.5      Method Type: CONNER    Culture - Surgical Swab (collected 10-21-22 @ 14:04)  Source: .Surgical Swab None  Final Report (10-26-22 @ 16:01):    Rare Proteus vulgaris group    Few Enterococcus faecium (vancomycin resistant)    Rare Pseudomonas putida group    Rare Coag Negative Staphylococcus "Susceptibilities not performed"  Organism: Proteus vulgaris group  Enterococcus faecium (vancomycin resistant)  Pseudomonas putida group (10-26-22 @ 16:01)  Organism: Pseudomonas putida group (10-26-22 @ 16:01)      -  Amikacin: S <=16      -  Aztreonam: S 8      -  Cefepime: S <=2      -  Ceftriaxone: S 8      -  Ciprofloxacin: S <=0.25      -  Gentamicin: S <=2      -  Levofloxacin: S <=0.5      -  Meropenem: S <=1      -  Piperacillin/Tazobactam: S <=8      -  Tobramycin: S <=2      -  Trimethoprim/Sulfamethoxazole: R >2/38      Method Type: CONNER  Organism: Enterococcus faecium (vancomycin resistant) (10-26-22 @ 16:01)      -  Ampicillin: R >8 Predicts results to ampicillin/sulbactam, amoxacillin-clavulanate and  piperacillin-tazobactam.      -  Daptomycin: SDD 4 The breakpoint for SDD (sensitive dose dependent)is based on a dosage regimen of 8-12 mg/kg administered every 24 h in adults and is intended for serious infections due to E. faecium. Consultation with an infectious diseases specialist is recommended.      -  Levofloxacin: R >4      -  Linezolid: S 2      -  Tetra/Doxy: R >8      -  Vancomycin: R >16      Method Type: CONNER  Organism: Proteus vulgaris group (10-26-22 @ 16:01)      -  Amikacin: S <=16      -  Amoxicillin/Clavulanic Acid: S <=8/4      -  Ampicillin: R <=8 These ampicillin results predict results for amoxicillin      -  Ampicillin/Sulbactam: S <=4/2 Enterobacter, Klebsiella aerogenes, Citrobacter, and Serratia may develop resistance during prolonged therapy (3-4 days)      -  Aztreonam: S <=4      -  Cefazolin: R <=2 Enterobacter, Klebsiella aerogenes, Citrobacter, and Serratia may develop resistance during prolonged therapy (3-4 days)      -  Cefepime: S <=2      -  Cefoxitin: S <=8      -  Ceftriaxone: S <=1 Enterobacter, Klebsiella aerogenes, Citrobacter, and Serratia may develop resistance during prolonged therapy      -  Ciprofloxacin: S <=0.25      -  Ertapenem: S <=0.5      -  Gentamicin: S <=2      -  Levofloxacin: S <=0.5      -  Meropenem: S <=1      -  Piperacillin/Tazobactam: S <=8      -  Tobramycin: S <=2      -  Trimethoprim/Sulfamethoxazole: S <=0.5/9.5      Method Type: CONNER    Culture - Abscess with Gram Stain (collected 10-16-22 @ 17:50)  Source: .Abscess left foot wound  Final Report (10-21-22 @ 18:00):    Rare Morganella morganii    Rare Enterobacter cloacae complex    Rare Proteus vulgaris group    Rare Enterococcus faecium (vancomycin resistant)  Organism: Morganella morganii  Enterobacter cloacae complex  Proteus vulgaris group  Enterococcus faecium (vancomycin resistant) (10-21-22 @ 18:00)  Organism: Enterococcus faecium (vancomycin resistant) (10-21-22 @ 18:00)      -  Ampicillin: R >8 Predicts results to ampicillin/sulbactam, amoxacillin-clavulanate and  piperacillin-tazobactam.      -  Daptomycin: SDD 4 The breakpoint for SDD (sensitive dose dependent)is based on a dosage regimen of 8-12 mg/kg administered every 24 h in adults and is intended for serious infections due to E. faecium. Consultation with an infectious diseases specialist is recommended.      -  Levofloxacin: R >4      -  Linezolid: S 1      -  Tetra/Doxy: R >8      -  Vancomycin: R >16      Method Type: CONNER  Organism: Proteus vulgaris group (10-21-22 @ 18:00)      -  Amikacin: S <=16      -  Amoxicillin/Clavulanic Acid: S <=8/4      -  Ampicillin: R >16 These ampicillin results predict results for amoxicillin      -  Ampicillin/Sulbactam: S <=4/2 Enterobacter, Klebsiella aerogenes, Citrobacter, and Serratia may develop resistance during prolonged therapy (3-4 days)      -  Aztreonam: S <=4      -  Cefazolin: R >16 Enterobacter, Klebsiella aerogenes, Citrobacter, and Serratia may develop resistance during prolonged therapy (3-4 days)      -  Cefepime: S <=2      -  Cefoxitin: S <=8      -  Ceftriaxone: S <=1 Enterobacter, Klebsiella aerogenes, Citrobacter, and Serratia may develop resistance during prolonged therapy      -  Ciprofloxacin: S <=0.25      -  Ertapenem: S <=0.5      -  Gentamicin: S <=2      -  Levofloxacin: S <=0.5      -  Meropenem: S <=1      -  Piperacillin/Tazobactam: S <=8      -  Tobramycin: S <=2      -  Trimethoprim/Sulfamethoxazole: S <=0.5/9.5      Method Type: CONNER  Organism: Enterobacter cloacae complex (10-21-22 @ 18:00)      -  Amikacin: S <=16      -  Amoxicillin/Clavulanic Acid: R 16/8      -  Ampicillin: R >16 These ampicillin results predict results for amoxicillin      -  Ampicillin/Sulbactam: R <=4/2 Enterobacter, Klebsiella aerogenes, Citrobacter, and Serratia may develop resistance during prolonged therapy (3-4 days)      -  Aztreonam: S 8      -  Cefazolin: R >16 Enterobacter, Klebsiella aerogenes, Citrobacter, and Serratia may develop resistance during prolonged therapy (3-4 days)      -  Cefepime: S <=2      -  Cefoxitin: R 16      -  Ceftriaxone: R >32 Enterobacter, Klebsiella aerogenes, Citrobacter, and Serratia may develop resistance during prolonged therapy      -  Ciprofloxacin: S <=0.25      -  Ertapenem: S <=0.5      -  Gentamicin: S <=2      -  Imipenem: S <=1      -  Levofloxacin: S <=0.5      -  Meropenem: S <=1      -  Piperacillin/Tazobactam: S <=8      -  Tobramycin: S <=2      -  Trimethoprim/Sulfamethoxazole: S 1/19      Method Type: CONNER  Organism: Morganella morganii (10-21-22 @ 18:00)      -  Amikacin: S <=16      -  Amoxicillin/Clavulanic Acid: R >16/8      -  Ampicillin: R >16 These ampicillin results predict results for amoxicillin      -  Ampicillin/Sulbactam: I 16/8 Enterobacter, Klebsiella aerogenes, Citrobacter, and Serratia may develop resistance during prolonged therapy (3-4 days)      -  Aztreonam: S <=4      -  Cefazolin: R >16 Enterobacter, Klebsiella aerogenes, Citrobacter, and Serratia may develop resistance during prolonged therapy (3-4 days)      -  Cefepime: S <=2      -  Cefoxitin: S <=8      -  Ceftriaxone: S <=1 Enterobacter, Klebsiella aerogenes, Citrobacter, and Serratia may develop resistance during prolonged therapy      -  Ciprofloxacin: S <=0.25      -  Ertapenem: S <=0.5      -  Gentamicin: S <=2      -  Imipenem: S <=1      -  Levofloxacin: S <=0.5      -  Meropenem: S <=1      -  Piperacillin/Tazobactam: S <=8      -  Tobramycin: S <=2      -  Trimethoprim/Sulfamethoxazole: S <=0.5/9.5      Method Type: CONNER    Culture - Blood (collected 10-15-22 @ 12:07)  Source: .Blood None  Final Report (10-20-22 @ 21:00):    No Growth Final            INFECTIOUS DISEASES TESTING  COVID-19 PCR: NotDetec (11-02-22 @ 14:52)  COVID-19 PCR: NotDetec (10-30-22 @ 12:40)  COVID-19 PCR: NotDetec (10-26-22 @ 13:06)  COVID-19 PCR: NotDetec (10-17-22 @ 20:19)  COVID-19 PCR: Detected (09-20-22 @ 12:50)  COVID-19 PCR: NotDetec (09-14-22 @ 13:45)  COVID-19 PCR: NotDetec (09-06-22 @ 01:35)  COVID-19 PCR: NotDetec (03-02-22 @ 14:15)  COVID-19 PCR: NotDetec (02-27-22 @ 15:30)  COVID-19 PCR: NotDetec (02-23-22 @ 11:50)  COVID-19 PCR: NotDetec (02-21-22 @ 10:25)      INFLAMMATORY MARKERS  C-Reactive Protein, Serum: 114.2 mg/L (10-16-22 @ 12:26)  Sedimentation Rate, Erythrocyte: 140 mm/Hr (10-16-22 @ 12:26)  Sedimentation Rate, Erythrocyte: 142 mm/Hr (10-15-22 @ 09:53)  C-Reactive Protein, Serum: 141.8 mg/L (10-15-22 @ 09:53)      RADIOLOGY & ADDITIONAL TESTS:  I have personally reviewed the last available Chest xray  CXR      CT      CARDIOLOGY TESTING  12 Lead ECG:   Ventricular Rate 92 BPM    Atrial Rate 92 BPM    P-R Interval 192 ms    QRS Duration 104 ms    Q-T Interval 428 ms    QTC Calculation(Bazett) 529 ms    R Axis 82 degrees    T Axis 130 degrees    Diagnosis Line Normal sinus rhythm  Incomplete right bundle branch block  ST & T wave abnormality, consider inferior ischemia  Prolonged QT  Abnormal ECG    Confirmed by Yusef Isaacs ([id number]) on 10/28/2022 12:12:54 PM (10-28-22 @ 11:42)  12 Lead ECG:   Ventricular Rate 74 BPM    Atrial Rate 74 BPM    P-R Interval 174 ms    QRS Duration 100 ms    Q-T Interval 454 ms    QTC Calculation(Bazett) 503 ms    P Axis 29 degrees    R Axis 138 degrees    T Axis 87 degrees    Diagnosis Line Normal sinus rhythm  Right axis deviation  Nonspecific T wave abnormality  Prolonged QT  Abnormal ECG    Confirmed by INGRIS JACOME MD ([id number]) on 10/28/2022 7:10:27 AM (10-27-22 @ 17:38)      MEDICATIONS  aspirin enteric coated 81 Oral daily  atorvastatin 40 Oral at bedtime  Dakins Solution - 1/2 Strength 1 Topical daily  DAPTOmycin IVPB 950 IV Intermittent every 48 hours  darbepoetin Injectable Syringe 40 IV Push <User Schedule>  heparin   Injectable 5000 SubCutaneous every 12 hours  influenza   Vaccine 0.5 IntraMuscular once  insulin glargine Injectable (LANTUS) 12 SubCutaneous at bedtime  insulin lispro (ADMELOG) corrective regimen sliding scale  SubCutaneous three times a day before meals  insulin lispro Injectable (ADMELOG). 6 SubCutaneous once  NIFEdipine XL 30 Oral daily  polyethylene glycol 3350 17 Oral two times a day  senna 2 Oral at bedtime  sevelamer carbonate 800 Oral three times a day  tamsulosin 0.4 Oral at bedtime      WEIGHT  Weight (kg): 90.1 (11-03-22 @ 11:21)  Creatinine, Serum: 6.1 mg/dL (11-04-22 @ 08:03)      ANTIBIOTICS:  DAPTOmycin IVPB 950 milliGRAM(s) IV Intermittent every 48 hours      All available historical records have been reviewed

## 2022-11-05 NOTE — PROGRESS NOTE ADULT - ASSESSMENT
63 y/o with a PMHx of DM, HTN, ESRD (on dialysis M/W/F, last session on 10/14), CAD s/p 1 stent on 2008 and quadruple CABG on 2012, PAD s/p bilateral angioplasty at [facility], was transferred from [facility] complaining of weakness and unhealed wound in the left foot.    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 hx of PAD   - IR for angiogram 10/20/22: a) Left lower extremity angiogram demonstrating chronically occluded PT and AT with patent anterior tibial vein bypass reconstituting into the DP. b) No microvascular outflow demonstrates within the calcaneous. No hemodynamically significant stenosis within the left SFA and popliteal artery. No intervention performed  - Blood cx 10/15:NTD   - Wound Cx 9/16 - Enterobacter cloacae  - treated with two weeks post-HD vancomycin + cefepime  - wound CX 10/16 VRE Faecim, Enterobacter cloacae complex, Proteus vulgaris, Morganella morganii   - s/p debridement 10/21 Wound Cx Proteus mirabilis, VRE faecium, Pseudomonas putida  - MRI L foot 10/17: a) Osteomyelitis and erosion of the calcaneus with a vertical pathologic fracture extending to the subtalar joint. Gangrene of overlying subcutaneous tissues. 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  - ID consult appreciated - will likely need at least 6 weeks from last debridement for calcaneal OM  - Cont meropenem 500mg IV daily   - C/w daptomycin 12 mg/kg q 48 hours to cover VRE faecium   - Creatine Kinase, Serum: 15 U/L (10.26.22 @ 06:53)  - Creatine Kinase, Serum: 23 U/L (11.1.22 @ 08:13) -- check CK weekly 2/2 daptomycin use (next check 11/9)  - Podiatry consult:  a) Local Wound Care; Wound Flushed w/ NS; Wound Packed w/ xeroform/ ABD x2 / DSD / Kerlix / Ace bandage  b) Weight Bearing Status; WBAT w/ Heel Touch w/ Surgical Shoe;   c) Continue w/ Local Wound Care; Q24 Dressing Changes;  - S/p Initial debridement 10/21, S/p 10/24/22 excisional debridement of soft tissue and bone of left foot; (pt given 1u pRBCs pre-op)  - S/p 10/27 left lower extremity angiogram, peroneal artery angioplasty and atherectomy 2/ occluded segment found on previous angiogram with Dr. Malik  - Cardio consult 10/26 appreciated patient at high risk for surgery  - S/p 3rd surgical debridement with Podiatry, F/u recs    #Dysuria - r/o UTI - improving   #H/o BPH  - Pt already receiving meropenem - should cover most UTI causing bacteria  - UA 10/23 WBC 13, nitrite negative, LEC small, bacteria negative   - 10/25 urine cx (-)  - 10/25 Kidney & Bladder sonogram (-) for hydronephrosis   -  cc, BPH 89 cc (10/25/22)  - DC fluconazole (10/25-10/28) in case of fungal origin 5 d. course per ID - stopped 10/28 2/ QTC   - EKG 10/25: . --> 10/27 . --> 10/28   - Per nephro d/c phenazopyridine in HD patient  - Started Flomax 0.4mg PO q24h  - Repeat bladder scan (11/1): 17cc.    #HTN  - C/w nifedipine 30mg PO q24h    # ESRD on HD  - HD as per nephrology   - C/w darbepoetin  - C/w Sevelamer 800mg TID    # CAD s/p stent and CABG   - Lipitor 40mg qHS  - Discuss with cardio. we're unable to hold aspirin. Cont aspirin   - holding plavix since 10/16  - Continue BB  - Cardio consult 10/19 appreciated at moderate risk for surgery     # DM II  - Follow FSG  - Lantus 12U qHS, +2 CF insulin sliding scale    # Constipation - resolved   - C/w Miralax, senna daily    #Other  Diet: DASH/TLC, Renal  Activity: Increase as tolerated, LLE toe touch weight bearing with surgical shoe  GI PPX: None  DVT PPX: Heparin 5000U subQ q12h  Dispo: From home, pending debridement revision, final Abx recommendations 61 y/o with a PMHx of DM, HTN, ESRD (on dialysis M/W/F, last session on 10/14), CAD s/p 1 stent on 2008 and quadruple CABG on 2012, PAD s/p bilateral angioplasty at [facility], was transferred from [facility] complaining of weakness and unhealed wound in the left foot.    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 hx of PAD   - IR for angiogram 10/20/22: a) Left lower extremity angiogram demonstrating chronically occluded PT and AT with patent anterior tibial vein bypass reconstituting into the DP. b) No microvascular outflow demonstrates within the calcaneous. No hemodynamically significant stenosis within the left SFA and popliteal artery. No intervention performed  - Blood cx 10/15:NTD   - Wound Cx 9/16 - Enterobacter cloacae  - treated with two weeks post-HD vancomycin + cefepime  - wound CX 10/16 VRE Faecim, Enterobacter cloacae complex, Proteus vulgaris, Morganella morganii   - s/p debridement 10/21 Wound Cx Proteus mirabilis, VRE faecium, Pseudomonas putida  - MRI L foot 10/17: a) Osteomyelitis and erosion of the calcaneus with a vertical pathologic fracture extending to the subtalar joint. Gangrene of overlying subcutaneous tissues. 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  - ID consult appreciated - will likely need at least 6 weeks from last debridement for calcaneal OM  - Cont meropenem 500mg IV daily   - C/w daptomycin 12 mg/kg q 48 hours to cover VRE faecium   - Creatine Kinase, Serum: 15 U/L (10.26.22 @ 06:53)  - Creatine Kinase, Serum: 23 U/L (11.1.22 @ 08:13) -- check CK weekly 2/2 daptomycin use (next check 11/9)  - Podiatry consult:  a) Local Wound Care; Wound Flushed w/ NS; Wound Packed w/ xeroform/ ABD x2 / DSD / Kerlix / Ace bandage  b) Weight Bearing Status; WBAT w/ Heel Touch w/ Surgical Shoe;   c) Continue w/ Local Wound Care; Q24 Dressing Changes;  - S/p Initial debridement 10/21, S/p 10/24/22 excisional debridement of soft tissue and bone of left foot; (pt given 1u pRBCs pre-op)  - S/p 10/27 left lower extremity angiogram, peroneal artery angioplasty and atherectomy 2/ occluded segment found on previous angiogram with Dr. Malik  - Cardio consult 10/26 appreciated patient at high risk for surgery  - S/p 3rd surgical debridement with Podiatry  - ID recs post-discharge: switch to cefepime 2g post HD and daptomycin 10 mg/kg post HD x first two sessions of the week and 12 mg/kg post HD on third session,  Weekly CBC, CMP, ESR/CRP, and CK measurements while on Dapto. Will need antibiotics for 4 weeks since last debridement (11/3-11/30)  - ID follow-up with Dr. Gomez Mathur for Telehealth. ID will call the patient between 10:30-1:30.    #Dysuria - r/o UTI - improving   #H/o BPH  - Pt already receiving meropenem - should cover most UTI causing bacteria  - UA 10/23 WBC 13, nitrite negative, LEC small, bacteria negative   - 10/25 urine cx (-)  - 10/25 Kidney & Bladder sonogram (-) for hydronephrosis   -  cc, BPH 89 cc (10/25/22)  - DC fluconazole (10/25-10/28) in case of fungal origin 5 d. course per ID - stopped 10/28 2/ QTC   - EKG 10/25: . --> 10/27 . --> 10/28   - Per nephro d/c phenazopyridine in HD patient  - Started Flomax 0.4mg PO q24h  - Repeat bladder scan (11/1): 17cc.    #HTN  - C/w nifedipine 30mg PO q24h    # ESRD on HD  - HD as per nephrology   - C/w darbepoetin  - C/w Sevelamer 800mg TID    # CAD s/p stent and CABG   - Lipitor 40mg qHS  - Discuss with cardio. we're unable to hold aspirin. Cont aspirin   - holding plavix since 10/16  - Continue BB  - Cardio consult 10/19 appreciated at moderate risk for surgery     # DM II  - Follow FSG  - Lantus 12U qHS, +2 CF insulin sliding scale    # Constipation - resolved   - C/w Miralax, senna daily    #Other  Diet: DASH/TLC, Renal  Activity: Increase as tolerated, LLE toe touch weight bearing with surgical shoe  GI PPX: None  DVT PPX: Heparin 5000U subQ q12h  Dispo: From home, pending debridement revision, final Abx recommendations

## 2022-11-06 LAB
ALBUMIN SERPL ELPH-MCNC: 3 G/DL — LOW (ref 3.5–5.2)
ALP SERPL-CCNC: 151 U/L — HIGH (ref 30–115)
ALT FLD-CCNC: <5 U/L — SIGNIFICANT CHANGE UP (ref 0–41)
ANION GAP SERPL CALC-SCNC: 15 MMOL/L — HIGH (ref 7–14)
AST SERPL-CCNC: 10 U/L — SIGNIFICANT CHANGE UP (ref 0–41)
BILIRUB SERPL-MCNC: 0.3 MG/DL — SIGNIFICANT CHANGE UP (ref 0.2–1.2)
BUN SERPL-MCNC: 55 MG/DL — HIGH (ref 10–20)
CALCIUM SERPL-MCNC: 8 MG/DL — LOW (ref 8.4–10.5)
CHLORIDE SERPL-SCNC: 93 MMOL/L — LOW (ref 98–110)
CO2 SERPL-SCNC: 26 MMOL/L — SIGNIFICANT CHANGE UP (ref 17–32)
CREAT SERPL-MCNC: 6.1 MG/DL — CRITICAL HIGH (ref 0.7–1.5)
EGFR: 10 ML/MIN/1.73M2 — LOW
GLUCOSE BLDC GLUCOMTR-MCNC: 109 MG/DL — HIGH (ref 70–99)
GLUCOSE BLDC GLUCOMTR-MCNC: 185 MG/DL — HIGH (ref 70–99)
GLUCOSE BLDC GLUCOMTR-MCNC: 218 MG/DL — HIGH (ref 70–99)
GLUCOSE BLDC GLUCOMTR-MCNC: 246 MG/DL — HIGH (ref 70–99)
GLUCOSE SERPL-MCNC: 161 MG/DL — HIGH (ref 70–99)
HCT VFR BLD CALC: 22.1 % — LOW (ref 42–52)
HGB BLD-MCNC: 7.3 G/DL — LOW (ref 14–18)
MAGNESIUM SERPL-MCNC: 1.9 MG/DL — SIGNIFICANT CHANGE UP (ref 1.8–2.4)
MCHC RBC-ENTMCNC: 31.5 PG — HIGH (ref 27–31)
MCHC RBC-ENTMCNC: 33 G/DL — SIGNIFICANT CHANGE UP (ref 32–37)
MCV RBC AUTO: 95.3 FL — HIGH (ref 80–94)
NRBC # BLD: 0 /100 WBCS — SIGNIFICANT CHANGE UP (ref 0–0)
PLATELET # BLD AUTO: 234 K/UL — SIGNIFICANT CHANGE UP (ref 130–400)
POTASSIUM SERPL-MCNC: 4.9 MMOL/L — SIGNIFICANT CHANGE UP (ref 3.5–5)
POTASSIUM SERPL-SCNC: 4.9 MMOL/L — SIGNIFICANT CHANGE UP (ref 3.5–5)
PROT SERPL-MCNC: 7.3 G/DL — SIGNIFICANT CHANGE UP (ref 6–8)
RBC # BLD: 2.32 M/UL — LOW (ref 4.7–6.1)
RBC # FLD: 14.6 % — HIGH (ref 11.5–14.5)
SODIUM SERPL-SCNC: 134 MMOL/L — LOW (ref 135–146)
WBC # BLD: 7 K/UL — SIGNIFICANT CHANGE UP (ref 4.8–10.8)
WBC # FLD AUTO: 7 K/UL — SIGNIFICANT CHANGE UP (ref 4.8–10.8)

## 2022-11-06 PROCEDURE — 99233 SBSQ HOSP IP/OBS HIGH 50: CPT

## 2022-11-06 RX ADMIN — Medication 2: at 11:50

## 2022-11-06 RX ADMIN — HEPARIN SODIUM 5000 UNIT(S): 5000 INJECTION INTRAVENOUS; SUBCUTANEOUS at 17:12

## 2022-11-06 RX ADMIN — POLYETHYLENE GLYCOL 3350 17 GRAM(S): 17 POWDER, FOR SOLUTION ORAL at 05:14

## 2022-11-06 RX ADMIN — INSULIN GLARGINE 12 UNIT(S): 100 INJECTION, SOLUTION SUBCUTANEOUS at 21:09

## 2022-11-06 RX ADMIN — SEVELAMER CARBONATE 800 MILLIGRAM(S): 2400 POWDER, FOR SUSPENSION ORAL at 14:53

## 2022-11-06 RX ADMIN — SEVELAMER CARBONATE 800 MILLIGRAM(S): 2400 POWDER, FOR SUSPENSION ORAL at 05:14

## 2022-11-06 RX ADMIN — POLYETHYLENE GLYCOL 3350 17 GRAM(S): 17 POWDER, FOR SOLUTION ORAL at 17:12

## 2022-11-06 RX ADMIN — Medication 81 MILLIGRAM(S): at 11:50

## 2022-11-06 RX ADMIN — Medication 30 MILLIGRAM(S): at 05:13

## 2022-11-06 RX ADMIN — SENNA PLUS 2 TABLET(S): 8.6 TABLET ORAL at 21:09

## 2022-11-06 RX ADMIN — HEPARIN SODIUM 5000 UNIT(S): 5000 INJECTION INTRAVENOUS; SUBCUTANEOUS at 05:14

## 2022-11-06 RX ADMIN — TAMSULOSIN HYDROCHLORIDE 0.4 MILLIGRAM(S): 0.4 CAPSULE ORAL at 21:09

## 2022-11-06 RX ADMIN — ATORVASTATIN CALCIUM 40 MILLIGRAM(S): 80 TABLET, FILM COATED ORAL at 21:09

## 2022-11-06 RX ADMIN — Medication 4: at 16:54

## 2022-11-06 RX ADMIN — SEVELAMER CARBONATE 800 MILLIGRAM(S): 2400 POWDER, FOR SUSPENSION ORAL at 21:08

## 2022-11-06 NOTE — PROGRESS NOTE ADULT - SUBJECTIVE AND OBJECTIVE BOX
SCHWABACHER, LAWRENCE  62y  [facility] F3-4B 009 B      Patient is a 62y old  Male who presents with a chief complaint of Sepsis (05 Nov 2022 13:26)      INTERVAL HPI/OVERNIGHT EVENTS:    no events overnight    REVIEW OF SYSTEMS:  CONSTITUTIONAL: No fever, weight loss, or fatigue  EYES: No eye pain, visual disturbances, or discharge  ENMT:  No difficulty hearing, tinnitus, vertigo; No sinus or throat pain  NECK: No pain or stiffness  BREASTS: No pain, masses, or nipple discharge  RESPIRATORY: No cough, wheezing, chills or hemoptysis; No shortness of breath  CARDIOVASCULAR: No chest pain, palpitations, dizziness, or leg swelling  GASTROINTESTINAL: No abdominal or epigastric pain. No nausea, vomiting, or hematemesis; No diarrhea or constipation. No melena or hematochezia.  GENITOURINARY: No dysuria, frequency, hematuria, or incontinence  NEUROLOGICAL: No headaches, memory loss, loss of strength, numbness, or tremors  SKIN: No itching, burning, rashes, or lesions   LYMPH NODES: No enlarged glands  ENDOCRINE: No heat or cold intolerance; No hair loss  MUSCULOSKELETAL: No joint pain or swelling; No muscle, back, or extremity pain  PSYCHIATRIC: No depression, anxiety, mood swings, or difficulty sleeping  HEME/LYMPH: No easy bruising, or bleeding gums  ALLERY AND IMMUNOLOGIC: No hives or eczema  FAMILY HISTORY:  Family history of heart disease (Father)    DM (diabetes mellitus) (Mother)    ESRD (end stage renal disease) on dialysis (Mother)      T(C): 36.7 (11-06-22 @ 04:28), Max: 36.7 (11-05-22 @ 21:01)  HR: 105 (11-06-22 @ 04:28) (88 - 105)  BP: 153/71 (11-06-22 @ 04:28) (138/70 - 153/71)  RR: 18 (11-06-22 @ 04:28) (18 - 18)  SpO2: 98% (11-05-22 @ 21:01) (97% - 98%)  Wt(kg): --Vital Signs Last 24 Hrs  T(C): 36.7 (06 Nov 2022 04:28), Max: 36.7 (05 Nov 2022 21:01)  T(F): 98.1 (06 Nov 2022 04:28), Max: 98.1 (05 Nov 2022 21:01)  HR: 105 (06 Nov 2022 04:28) (88 - 105)  BP: 153/71 (06 Nov 2022 04:28) (138/70 - 153/71)  BP(mean): --  RR: 18 (06 Nov 2022 04:28) (18 - 18)  SpO2: 98% (05 Nov 2022 21:01) (97% - 98%)    Parameters below as of 05 Nov 2022 21:01  Patient On (Oxygen Delivery Method): room air        PHYSICAL EXAM:  GENERAL: NAD, well-groomed, well-developed  HEAD:  Atraumatic, Normocephalic  EYES: EOMI, PERRLA, conjunctiva and sclera clear  ENMT: No tonsillar erythema, exudates, or enlargement; Moist mucous membranes, Good dentition, No lesions  NECK: Supple, No JVD, Normal thyroid  NERVOUS SYSTEM:  Alert & Oriented X3, Good concentration; Motor Strength 5/5 B/L upper and lower extremities; DTRs 2+ intact and symmetric  PULM: Clear to auscultation bilaterally  CARDIAC: Regular rate and rhythm; No murmurs, rubs, or gallops  GI: Soft, Nontender, Nondistended; Bowel sounds present  EXTREMITIES:  lle pitting edema with foot dressed  LYMPH: No lymphadenopathy noted  SKIN: No rashes or lesions    Consultant(s) Notes Reviewed:  [x ] YES  [ ] NO  Care Discussed with Consultants/Other Providers [ x] YES  [ ] NO    LABS:                            7.2    7.39  )-----------( 210      ( 05 Nov 2022 07:30 )             20.8   11-05    141  |  100  |  41<H>  ----------------------------<  78  4.4   |  29  |  5.5<HH>    Ca    8.1<L>      05 Nov 2022 07:30  Mg     2.0     11-05    TPro  6.6  /  Alb  2.7<L>  /  TBili  0.4  /  DBili  x   /  AST  11  /  ALT  <5  /  AlkPhos  143<H>  11-05            Culture - Other (collected 03 Nov 2022 11:50)  Source: .Other None  Final Report (05 Nov 2022 20:52):    No growth      acetaminophen     Tablet .. 650 milliGRAM(s) Oral every 6 hours PRN  aluminum hydroxide/magnesium hydroxide/simethicone Suspension 30 milliLiter(s) Oral every 4 hours PRN  aspirin enteric coated 81 milliGRAM(s) Oral daily  atorvastatin 40 milliGRAM(s) Oral at bedtime  Dakins Solution - 1/2 Strength 1 Application(s) Topical daily  DAPTOmycin IVPB 950 milliGRAM(s) IV Intermittent every 48 hours  darbepoetin Injectable Syringe 40 MICROGram(s) IV Push <User Schedule>  heparin   Injectable 5000 Unit(s) SubCutaneous every 12 hours  influenza   Vaccine 0.5 milliLiter(s) IntraMuscular once  insulin glargine Injectable (LANTUS) 12 Unit(s) SubCutaneous at bedtime  insulin lispro (ADMELOG) corrective regimen sliding scale   SubCutaneous three times a day before meals  insulin lispro Injectable (ADMELOG). 6 Unit(s) SubCutaneous once  melatonin 3 milliGRAM(s) Oral at bedtime PRN  NIFEdipine XL 30 milliGRAM(s) Oral daily  ondansetron Injectable 4 milliGRAM(s) IV Push every 8 hours PRN  oxycodone    5 mG/acetaminophen 325 mG 1 Tablet(s) Oral every 6 hours PRN  polyethylene glycol 3350 17 Gram(s) Oral two times a day  senna 2 Tablet(s) Oral at bedtime  sevelamer carbonate 800 milliGRAM(s) Oral three times a day  tamsulosin 0.4 milliGRAM(s) Oral at bedtime      HEALTH ISSUES - PROBLEM Dx:          Case Discussed with House Staff      Spectra [phone]

## 2022-11-06 NOTE — PROGRESS NOTE ADULT - ASSESSMENT
63 y/o with a PMHx of DM, HTN, ESRD (on dialysis M/W/F, last session on 10/14), CAD s/p 1 stent on 2008 and quadruple CABG on 2012, PAD s/p bilateral angioplasty at [facility], was transferred from [facility] complaining of weakness and unhealed wound in the left foot.    # Left foot ulcer and Left calcaneous OM complicated by H/o ESBL E coli Bacteremia at [facility]   sp debridement   - on discharge, switch to cefepime 2g post HD and daptomycin 10 mg/kg post HD x first two sessions of the week and 12 mg/kg post HD on third session    script provided for dispo arrangements    # ESRD on HD      # H/o  urinary  retention    # H/o BPH   on flomax    # CAD s/p stent and CABG     #Drug monitoring of high risk medication , potential for drug drug reaction , requiring close monitoring check ck on weekly basis     # Hypertension   BP: 153/71 (06 Nov 2022 04:28) (138/70 - 153/71)  stable     #DM type 2  POCT Blood Glucose.: 192 mg/dL (05 Nov 2022 21:24)  POCT Blood Glucose.: 248 mg/dL (05 Nov 2022 16:32)  POCT Blood Glucose.: 147 mg/dL (05 Nov 2022 11:00)  POCT Blood Glucose.: 128 mg/dL (05 Nov 2022 08:14)  controlled     #Anemia likely due to anemia of chronic disease with no evidence of bleeding ,  stable    # DVT prophylaxis    # Full code    PROGRESS NOTE HANDOFF    Pending:  dc planning  with wound vac , anticipate dc in am once arrangements with wound vac made    Family discussion: patient verbalized understanding and agreeable to plan of care     Disposition: Home

## 2022-11-06 NOTE — PROGRESS NOTE ADULT - ASSESSMENT
63 y/o with a PMHx of DM, HTN, ESRD (on dialysis M/W/F,), CAD s/p 1 stent on 2008 and quadruple CABG on 2012, PAD s/p bilateral angioplasty at [facility], was transferred from [facility] complaining of weakness and unhealed wound in the left foot.  ESRD - HD Fri  URINARY retention -  cc, BPH 89 cc (10/25/22)   - Repeat bladder scan (11/1): 17cc.  -cont Flomax,  increase nifedipine to 60 if bp remains elevated   Anemia - on Aranesp 40 weekly, on ABx, no Venofer/ resistant in view of infection- Foot ulcer keep Hb >7 , will need blood tx   PVD -non-healing left foot ulcer s/p LLE angiogram with peroneal angioplasty and atherectomy 10/27/22    dc planning noted   will follow

## 2022-11-06 NOTE — PROGRESS NOTE ADULT - SUBJECTIVE AND OBJECTIVE BOX
Nephrology progress note    Patient was seen and examined, events over the last 24 h noted .    Allergies:  No Known Allergies    Hospital Medications:   MEDICATIONS  (STANDING):  aspirin enteric coated 81 milliGRAM(s) Oral daily  atorvastatin 40 milliGRAM(s) Oral at bedtime  Dakins Solution - 1/2 Strength 1 Application(s) Topical daily  DAPTOmycin IVPB 950 milliGRAM(s) IV Intermittent every 48 hours  darbepoetin Injectable Syringe 40 MICROGram(s) IV Push <User Schedule>  heparin   Injectable 5000 Unit(s) SubCutaneous every 12 hours  influenza   Vaccine 0.5 milliLiter(s) IntraMuscular once  insulin glargine Injectable (LANTUS) 12 Unit(s) SubCutaneous at bedtime  insulin lispro (ADMELOG) corrective regimen sliding scale   SubCutaneous three times a day before meals  insulin lispro Injectable (ADMELOG). 6 Unit(s) SubCutaneous once  NIFEdipine XL 30 milliGRAM(s) Oral daily  polyethylene glycol 3350 17 Gram(s) Oral two times a day  senna 2 Tablet(s) Oral at bedtime  sevelamer carbonate 800 milliGRAM(s) Oral three times a day  tamsulosin 0.4 milliGRAM(s) Oral at bedtime        VITALS:  T(F): 98.1 (11-06-22 @ 04:28), Max: 98.1 (11-05-22 @ 21:01)  HR: 105 (11-06-22 @ 04:28)  BP: 153/71 (11-06-22 @ 04:28)  RR: 18 (11-06-22 @ 04:28)  SpO2: 98% (11-05-22 @ 21:01)  Wt(kg): --    11-04 @ 07:01  -  11-05 @ 07:00  --------------------------------------------------------  IN: 0 mL / OUT: 3000 mL / NET: -3000 mL    11-05 @ 08:01  -  11-06 @ 07:00  --------------------------------------------------------  IN: 360 mL / OUT: 550 mL / NET: -190 mL          PHYSICAL EXAM:  Constitutional: NAD  Neck: No JVD  Respiratory: CTAB, no wheezes, rales or rhonchi  Cardiovascular: S1, S2, RRR  Gastrointestinal: BS+, soft, NT/ND  Extremities: Lt foot bandaged No peripheral edema  Neurological: A/O x 3  : No CVA tenderness. No schneider.   Skin: No rashes  Vascular Access: AVF    LABS:  11-05    141  |  100  |  41<H>  ----------------------------<  78  4.4   |  29  |  5.5<HH>    Ca    8.1<L>      05 Nov 2022 07:30  Mg     2.0     11-05    TPro  6.6  /  Alb  2.7<L>  /  TBili  0.4  /  DBili      /  AST  11  /  ALT  <5  /  AlkPhos  143<H>  11-05                          7.3    7.00  )-----------( 234      ( 06 Nov 2022 08:44 )             22.1       Urine Studies:      RADIOLOGY & ADDITIONAL STUDIES:

## 2022-11-07 LAB
ALBUMIN SERPL ELPH-MCNC: 2.8 G/DL — LOW (ref 3.5–5.2)
ALP SERPL-CCNC: 164 U/L — HIGH (ref 30–115)
ALT FLD-CCNC: <5 U/L — SIGNIFICANT CHANGE UP (ref 0–41)
ANION GAP SERPL CALC-SCNC: 13 MMOL/L — SIGNIFICANT CHANGE UP (ref 7–14)
AST SERPL-CCNC: 9 U/L — SIGNIFICANT CHANGE UP (ref 0–41)
BILIRUB SERPL-MCNC: 0.3 MG/DL — SIGNIFICANT CHANGE UP (ref 0.2–1.2)
BUN SERPL-MCNC: 64 MG/DL — CRITICAL HIGH (ref 10–20)
CALCIUM SERPL-MCNC: 8 MG/DL — LOW (ref 8.4–10.4)
CHLORIDE SERPL-SCNC: 93 MMOL/L — LOW (ref 98–110)
CO2 SERPL-SCNC: 28 MMOL/L — SIGNIFICANT CHANGE UP (ref 17–32)
CREAT SERPL-MCNC: 6.8 MG/DL — CRITICAL HIGH (ref 0.7–1.5)
EGFR: 8 ML/MIN/1.73M2 — LOW
GLUCOSE BLDC GLUCOMTR-MCNC: 125 MG/DL — HIGH (ref 70–99)
GLUCOSE BLDC GLUCOMTR-MCNC: 189 MG/DL — HIGH (ref 70–99)
GLUCOSE BLDC GLUCOMTR-MCNC: 270 MG/DL — HIGH (ref 70–99)
GLUCOSE SERPL-MCNC: 98 MG/DL — SIGNIFICANT CHANGE UP (ref 70–99)
HCT VFR BLD CALC: 22.5 % — LOW (ref 42–52)
HGB BLD-MCNC: 7.6 G/DL — LOW (ref 14–18)
MAGNESIUM SERPL-MCNC: 2.1 MG/DL — SIGNIFICANT CHANGE UP (ref 1.8–2.4)
MCHC RBC-ENTMCNC: 31.3 PG — HIGH (ref 27–31)
MCHC RBC-ENTMCNC: 33.8 G/DL — SIGNIFICANT CHANGE UP (ref 32–37)
MCV RBC AUTO: 92.6 FL — SIGNIFICANT CHANGE UP (ref 80–94)
NRBC # BLD: 0 /100 WBCS — SIGNIFICANT CHANGE UP (ref 0–0)
PLATELET # BLD AUTO: 222 K/UL — SIGNIFICANT CHANGE UP (ref 130–400)
POTASSIUM SERPL-MCNC: 5.1 MMOL/L — HIGH (ref 3.5–5)
POTASSIUM SERPL-SCNC: 5.1 MMOL/L — HIGH (ref 3.5–5)
PROT SERPL-MCNC: 7.1 G/DL — SIGNIFICANT CHANGE UP (ref 6–8)
RBC # BLD: 2.43 M/UL — LOW (ref 4.7–6.1)
RBC # FLD: 14.3 % — SIGNIFICANT CHANGE UP (ref 11.5–14.5)
SARS-COV-2 RNA SPEC QL NAA+PROBE: SIGNIFICANT CHANGE UP
SODIUM SERPL-SCNC: 134 MMOL/L — LOW (ref 135–146)
WBC # BLD: 7.92 K/UL — SIGNIFICANT CHANGE UP (ref 4.8–10.8)
WBC # FLD AUTO: 7.92 K/UL — SIGNIFICANT CHANGE UP (ref 4.8–10.8)

## 2022-11-07 PROCEDURE — 99233 SBSQ HOSP IP/OBS HIGH 50: CPT

## 2022-11-07 PROCEDURE — 99232 SBSQ HOSP IP/OBS MODERATE 35: CPT | Mod: 24

## 2022-11-07 RX ORDER — TAMSULOSIN HYDROCHLORIDE 0.4 MG/1
1 CAPSULE ORAL
Qty: 0 | Refills: 0 | DISCHARGE
Start: 2022-11-07

## 2022-11-07 RX ORDER — NIFEDIPINE 30 MG
1 TABLET, EXTENDED RELEASE 24 HR ORAL
Qty: 0 | Refills: 0 | DISCHARGE
Start: 2022-11-07

## 2022-11-07 RX ORDER — METOPROLOL TARTRATE 50 MG
1 TABLET ORAL
Qty: 0 | Refills: 0 | DISCHARGE

## 2022-11-07 RX ADMIN — ATORVASTATIN CALCIUM 40 MILLIGRAM(S): 80 TABLET, FILM COATED ORAL at 21:09

## 2022-11-07 RX ADMIN — INSULIN GLARGINE 12 UNIT(S): 100 INJECTION, SOLUTION SUBCUTANEOUS at 21:09

## 2022-11-07 RX ADMIN — Medication 30 MILLIGRAM(S): at 05:10

## 2022-11-07 RX ADMIN — SEVELAMER CARBONATE 800 MILLIGRAM(S): 2400 POWDER, FOR SUSPENSION ORAL at 05:11

## 2022-11-07 RX ADMIN — HEPARIN SODIUM 5000 UNIT(S): 5000 INJECTION INTRAVENOUS; SUBCUTANEOUS at 17:36

## 2022-11-07 RX ADMIN — SEVELAMER CARBONATE 800 MILLIGRAM(S): 2400 POWDER, FOR SUSPENSION ORAL at 21:07

## 2022-11-07 RX ADMIN — Medication 81 MILLIGRAM(S): at 13:18

## 2022-11-07 RX ADMIN — HEPARIN SODIUM 5000 UNIT(S): 5000 INJECTION INTRAVENOUS; SUBCUTANEOUS at 05:11

## 2022-11-07 RX ADMIN — SEVELAMER CARBONATE 800 MILLIGRAM(S): 2400 POWDER, FOR SUSPENSION ORAL at 13:19

## 2022-11-07 RX ADMIN — Medication 6: at 17:14

## 2022-11-07 RX ADMIN — POLYETHYLENE GLYCOL 3350 17 GRAM(S): 17 POWDER, FOR SOLUTION ORAL at 05:11

## 2022-11-07 RX ADMIN — TAMSULOSIN HYDROCHLORIDE 0.4 MILLIGRAM(S): 0.4 CAPSULE ORAL at 21:06

## 2022-11-07 RX ADMIN — DAPTOMYCIN 138 MILLIGRAM(S): 500 INJECTION, POWDER, LYOPHILIZED, FOR SOLUTION INTRAVENOUS at 11:10

## 2022-11-07 NOTE — PROGRESS NOTE ADULT - SUBJECTIVE AND OBJECTIVE BOX
SCHWABACHER, LAWRENCE  62y  [facility] F3-4B 009 B      Patient is a 62y old  Male who presents with a chief complaint of Sepsis (07 Nov 2022 08:53)      INTERVAL HPI/OVERNIGHT EVENTS:    no acute events overnight     REVIEW OF SYSTEMS:  CONSTITUTIONAL: No fever, weight loss, or fatigue  EYES: No eye pain, visual disturbances, or discharge  ENMT:  No difficulty hearing, tinnitus, vertigo; No sinus or throat pain  NECK: No pain or stiffness  BREASTS: No pain, masses, or nipple discharge  RESPIRATORY: No cough, wheezing, chills or hemoptysis; No shortness of breath  CARDIOVASCULAR: No chest pain, palpitations, dizziness, or leg swelling  GASTROINTESTINAL: No abdominal or epigastric pain. No nausea, vomiting, or hematemesis; No diarrhea or constipation. No melena or hematochezia.  GENITOURINARY: No dysuria, frequency, hematuria, or incontinence  NEUROLOGICAL: No headaches, memory loss, loss of strength, numbness, or tremors  SKIN: No itching, burning, rashes, or lesions   LYMPH NODES: No enlarged glands  ENDOCRINE: No heat or cold intolerance; No hair loss  MUSCULOSKELETAL: No joint pain or swelling; No muscle, back, or extremity pain  PSYCHIATRIC: No depression, anxiety, mood swings, or difficulty sleeping  HEME/LYMPH: No easy bruising, or bleeding gums  ALLERY AND IMMUNOLOGIC: No hives or eczema  FAMILY HISTORY:  Family history of heart disease (Father)    DM (diabetes mellitus) (Mother)    ESRD (end stage renal disease) on dialysis (Mother)      T(C): 35.7 (11-07-22 @ 04:58), Max: 36.7 (11-06-22 @ 13:32)  HR: 102 (11-07-22 @ 04:58) (75 - 102)  BP: 143/65 (11-07-22 @ 04:58) (113/59 - 161/76)  RR: 18 (11-07-22 @ 04:58) (18 - 18)  SpO2: 100% (11-06-22 @ 21:01) (100% - 100%)  Wt(kg): --Vital Signs Last 24 Hrs  T(C): 35.7 (07 Nov 2022 04:58), Max: 36.7 (06 Nov 2022 13:32)  T(F): 96.3 (07 Nov 2022 04:58), Max: 98 (06 Nov 2022 13:32)  HR: 102 (07 Nov 2022 04:58) (75 - 102)  BP: 143/65 (07 Nov 2022 04:58) (113/59 - 161/76)  BP(mean): --  RR: 18 (07 Nov 2022 04:58) (18 - 18)  SpO2: 100% (06 Nov 2022 21:01) (100% - 100%)        PHYSICAL EXAM:  GENERAL: NAD,   EYES: EOMI, PERRLA, conjunctiva and sclera clear  ENMT: No tonsillar erythema, exudates, or enlargement; Moist mucous membranes, Good dentition, No lesions  NECK: Supple, No JVD, Normal thyroid  NERVOUS SYSTEM:  Alert & Oriented X3,   PULM: Clear to auscultation bilaterally  CARDIAC: Regular rate and rhythm; No murmurs, rubs, or gallops  GI: Soft, Nontender, Nondistended; Bowel sounds present  EXTREMITIES:  Left foot ulcer wrapped   LYMPH: No lymphadenopathy noted  SKIN: No rashes or lesions    Consultant(s) Notes Reviewed:  [x ] YES  [ ] NO  Care Discussed with Consultants/Other Providers [ x] YES  [ ] NO    LABS:                            7.6    7.92  )-----------( 222      ( 07 Nov 2022 06:16 )             22.5   11-07    134<L>  |  93<L>  |  64<HH>  ----------------------------<  98  5.1<H>   |  28  |  6.8<HH>    Ca    8.0<L>      07 Nov 2022 06:16  Mg     2.1     11-07    TPro  7.1  /  Alb  2.8<L>  /  TBili  0.3  /  DBili  x   /  AST  9   /  ALT  <5  /  AlkPhos  164<H>  11-07            acetaminophen     Tablet .. 650 milliGRAM(s) Oral every 6 hours PRN  aluminum hydroxide/magnesium hydroxide/simethicone Suspension 30 milliLiter(s) Oral every 4 hours PRN  aspirin enteric coated 81 milliGRAM(s) Oral daily  atorvastatin 40 milliGRAM(s) Oral at bedtime  Dakins Solution - 1/2 Strength 1 Application(s) Topical daily  DAPTOmycin IVPB 950 milliGRAM(s) IV Intermittent every 48 hours  darbepoetin Injectable Syringe 40 MICROGram(s) IV Push <User Schedule>  heparin   Injectable 5000 Unit(s) SubCutaneous every 12 hours  influenza   Vaccine 0.5 milliLiter(s) IntraMuscular once  insulin glargine Injectable (LANTUS) 12 Unit(s) SubCutaneous at bedtime  insulin lispro (ADMELOG) corrective regimen sliding scale   SubCutaneous three times a day before meals  insulin lispro Injectable (ADMELOG). 6 Unit(s) SubCutaneous once  melatonin 3 milliGRAM(s) Oral at bedtime PRN  NIFEdipine XL 30 milliGRAM(s) Oral daily  ondansetron Injectable 4 milliGRAM(s) IV Push every 8 hours PRN  oxycodone    5 mG/acetaminophen 325 mG 1 Tablet(s) Oral every 6 hours PRN  polyethylene glycol 3350 17 Gram(s) Oral two times a day  promethazine 25 milliGRAM(s) Oral once  senna 2 Tablet(s) Oral at bedtime  sevelamer carbonate 800 milliGRAM(s) Oral three times a day  tamsulosin 0.4 milliGRAM(s) Oral at bedtime      HEALTH ISSUES - PROBLEM Dx:          Case Discussed with House Staff       Spectra [phone]

## 2022-11-07 NOTE — PROGRESS NOTE ADULT - ASSESSMENT
63 y/o with a PMHx of DM, HTN, ESRD (on dialysis M/W/F, last session on 10/14), CAD s/p 1 stent on 2008 and quadruple CABG on 2012,     PAD s/p bilateral angioplasty at [facility], was transferred from [facility] complaining of weakness and unhealed wound in the left foot.  ESRD - HD today 3 hrs 2 K bath  UF 3 L   last Phos noted , cont binders  URINARY retention -Non oliguric  (10/25/22)   -cont Flomax,  Anemia - on Aranesp 40 weekly, on ABx, no Venofer/ resistant in view of infection- Foot ulcer keep Hb >7 , will need blood tx if Hb < 7  PVD -non-healing left foot ulcer s/p LLE angiogram with peroneal angioplasty and atherectomy 10/27/22 on Daptomycin for now   	    will follow

## 2022-11-07 NOTE — PROGRESS NOTE ADULT - SUBJECTIVE AND OBJECTIVE BOX
INTERNAL MEDICINE PROGRESS NOTE  LAWRENCE SCHWABACHER 62y Male  MRN#: [id number]     Hospital Day: 24d  Pt is currently admitted with the primary diagnosis of Diabetic Foot Ulcer    SUBJECTIVE  Overnight events     Subjective complaints  Patient was evaluated this morning. Reports no pain, states that last debridement was 11/3. Denies fever, chills, n,v,d.                                           OBJECTIVE  PAST MEDICAL & SURGICAL HISTORY  CAD (coronary artery disease)  1 stent 2008    S/P CABG (coronary artery bypass graft)  x4    Diabetes mellitus    Transient ischemic attack (TIA)  2017; 2008    Chronic kidney disease (CKD)  Stage IV    Hypertension    Stented coronary artery  in 2008    Myocardial infarction  2012    PAD (peripheral artery disease)  S/p bypass left leg    HLD (hyperlipidemia)    BPH (benign prostatic hyperplasia)    Pain in left knee  s/p fall    OA (osteoarthritis)    Robinson (hard of hearing)    Chronic anemia    S/P CABG (coronary artery bypass graft)  2012    H/O arterial bypass of lower limb  Left Lower Extremity (2016)    History of surgery  Left CEA (2017)  Left Pinkie toe Amputation (2014)  CABG x 4 (2012)  Card cath - stent (2008)      AV fistula  2019  LEFT AV FISTULA                                                ALLERGIES:  No Known Allergies                           HOME MEDICATIONS  Home Medications:  aspirin 81 mg oral tablet: 1 tab(s) orally once a day (14 Sep 2022 17:42)  furosemide 40 mg oral tablet: 2 tab(s) orally once a day (14 Sep 2022 17:42)  Levemir 100 units/mL subcutaneous solution: 40 unit(s) subcutaneous once a day (at bedtime) (14 Sep 2022 17:42)  Metoprolol Tartrate 50 mg oral tablet: 1 tab(s) orally 2 times a day (14 Sep 2022 17:42)  Plavix 75 mg oral tablet: 1 tab(s) orally once a day (14 Sep 2022 17:42)  sevelamer carbonate 800 mg oral tablet: 1 tab(s) orally 3 times a day (with meals) (20 Sep 2022 11:36)  Trulicity Pen 1.5 mg/0.5 mL subcutaneous solution: 1.5  subcutaneous once a week (14 Sep 2022 17:42)                           MEDICATIONS:  STANDING MEDICATIONS  aspirin enteric coated 81 milliGRAM(s) Oral daily  atorvastatin 40 milliGRAM(s) Oral at bedtime  Dakins Solution - 1/2 Strength 1 Application(s) Topical daily  DAPTOmycin IVPB 950 milliGRAM(s) IV Intermittent every 48 hours  darbepoetin Injectable Syringe 40 MICROGram(s) IV Push <User Schedule>  heparin   Injectable 5000 Unit(s) SubCutaneous every 12 hours  influenza   Vaccine 0.5 milliLiter(s) IntraMuscular once  insulin glargine Injectable (LANTUS) 12 Unit(s) SubCutaneous at bedtime  insulin lispro (ADMELOG) corrective regimen sliding scale   SubCutaneous three times a day before meals  insulin lispro Injectable (ADMELOG). 6 Unit(s) SubCutaneous once  NIFEdipine XL 30 milliGRAM(s) Oral daily  polyethylene glycol 3350 17 Gram(s) Oral two times a day  promethazine 25 milliGRAM(s) Oral once  senna 2 Tablet(s) Oral at bedtime  sevelamer carbonate 800 milliGRAM(s) Oral three times a day  tamsulosin 0.4 milliGRAM(s) Oral at bedtime    PRN MEDICATIONS  acetaminophen     Tablet .. 650 milliGRAM(s) Oral every 6 hours PRN  aluminum hydroxide/magnesium hydroxide/simethicone Suspension 30 milliLiter(s) Oral every 4 hours PRN  melatonin 3 milliGRAM(s) Oral at bedtime PRN  ondansetron Injectable 4 milliGRAM(s) IV Push every 8 hours PRN  oxycodone    5 mG/acetaminophen 325 mG 1 Tablet(s) Oral every 6 hours PRN                                            ------------------------------------------------------------  VITAL SIGNS: Last 24 Hours  T(C): 35.7 (07 Nov 2022 04:58), Max: 36.7 (06 Nov 2022 13:32)  T(F): 96.3 (07 Nov 2022 04:58), Max: 98 (06 Nov 2022 13:32)  HR: 102 (07 Nov 2022 04:58) (75 - 102)  BP: 143/65 (07 Nov 2022 04:58) (113/59 - 161/76)  BP(mean): --  RR: 18 (07 Nov 2022 04:58) (18 - 18)  SpO2: 100% (06 Nov 2022 21:01) (100% - 100%)      11-06-22 @ 07:01  -  11-07-22 @ 07:00  --------------------------------------------------------  IN: 510 mL / OUT: 1400 mL / NET: -890 mL                                               LABS:                        7.6    7.92  )-----------( 222      ( 07 Nov 2022 06:16 )             22.5     11-07    134<L>  |  93<L>  |  64<HH>  ----------------------------<  98  5.1<H>   |  28  |  6.8<HH>    Ca    8.0<L>      07 Nov 2022 06:16  Mg     2.1     11-07    TPro  7.1  /  Alb  2.8<L>  /  TBili  0.3  /  DBili  x   /  AST  9   /  ALT  <5  /  AlkPhos  164<H>  11-07                                                              RADIOLOGY:    PHYSICAL EXAM:  GENERAL: NAD, sitting in bed comfortably  HEAD:  Atraumatic, Normocephalic  EYES: EOMI  CHEST/LUNG: Clear to auscultation bilaterally  HEART: Regular rate and rhythm; No murmurs, rubs, or gallops  ABDOMEN: Bowel Sounds present; Soft, nontender, nondistended  EXTREMITIES: Left leg wrapped in ace bandage, clean dressing.  2+ Peripheral Pulses, brisk capillary refill. No clubbing, cyanosis, or edema  NERVOUS SYSTEM:  A&Ox3, no focal deficits   SKIN: No rashes or lesions                                       ASSESSMENT & PLAN  LAWRENCE SCHWABACHER is a 62y Male with a history significant for DM, HTN, ESRD (on dialysis M/W/F, last session on 10/14), CAD s/p 1 stent on 2008 and quadruple CABG on 2012, PAD s/p bilateral angioplasty at [facility], was transferred from [facility] complaining of weakness and unhealed wound in the left foot.    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 hx of PAD   - IR for angiogram 10/20/22: a) Left lower extremity angiogram demonstrating chronically occluded PT and AT with patent anterior tibial vein bypass reconstituting into the DP.  - Blood cx 10/15:NTD   - treated with two weeks post-HD vancomycin + cefepime  - wound CX 10/16 VRE Faecim, Enterobacter cloacae complex, Proteus vulgaris, Morganella morganii   - s/p debridement 10/21 Wound Cx Proteus mirabilis, VRE faecium, Pseudomonas putida  - MRI L foot 10/17: a) Osteomyelitis and erosion of the calcaneus with a vertical pathologic fracture extending to the subtalar joint. Gangrene of overlying subcutaneous tissues. 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  - ID consult - will likely need at least 6 weeks from last debridement for calcaneal OM  - Cont meropenem 500mg IV daily   - C/w daptomycin 12 mg/kg q 48 hours to cover VRE faecium   - Creatine Kinase, Serum: 15 U/L (10.26.22 @ 06:53)  - Creatine Kinase, Serum: 23 U/L (11.1.22 @ 08:13) -- check CK weekly 2/2 daptomycin use (next check 11/9)  - Podiatry consult:  a) Local Wound Care; Wound Flushed w/ NS; Wound Packed w/ xeroform/ ABD x2 / DSD / Kerlix / Ace bandage  b) Weight Bearing Status; WBAT w/ Heel Touch w/ Surgical Shoe;   c) Continue w/ Local Wound Care; Q24 Dressing Changes;  - S/p Initial debridement 10/21, S/p 10/24/22 excisional debridement of soft tissue and bone of left foot; (pt given 1u pRBCs pre-op)  - S/p 10/27 left lower extremity angiogram, peroneal artery angioplasty and atherectomy 2/ occluded segment found on previous angiogram with Dr. Moore  - S/p 3rd surgical debridement with Podiatry  - ID recs post-discharge: switch to cefepime 2g post HD and daptomycin 10 mg/kg post HD x first two sessions of the week and 12 mg/kg post HD on third session,  Weekly CBC, CMP, ESR/CRP, and CK measurements while on Dapto. Will need antibiotics for 4 weeks since last debridement (11/3-11/30)  - ID follow-up with Dr. Gomez Mathur for Telehealth. ID will call the patient between 10:30-1:30.    #Dysuria - r/o UTI - improving   #H/o BPH  - Pt already receiving meropenem - should cover most UTI causing bacteria  - UA 10/23 WBC 13, nitrite negative, LEC small, bacteria negative   - 10/25 urine cx (-)  - 10/25 Kidney & Bladder sonogram (-) for hydronephrosis   -  cc, BPH 89 cc (10/25/22)  - DC fluconazole (10/25-10/28) in case of fungal origin 5 d. course per ID - stopped 10/28 2/ QTC   - EKG 10/25: . --> 10/27 . --> 10/28   - Per nephro d/c phenazopyridine in HD patient  - Started Flomax 0.4mg PO q24h  - Repeat bladder scan (11/1): 17cc.    #HTN  - C/w nifedipine 30mg PO q24h    # ESRD on HD  - HD as per nephrology   - C/w darbepoetin  - C/w Sevelamer 800mg TID    # CAD s/p stent and CABG   - Lipitor 40mg qHS  - Discuss with cardio. we're unable to hold aspirin. Cont aspirin   - holding plavix since 10/16  - Continue BB  - Cardio consult 10/19 appreciated at moderate risk for surgery     # DM II  - Follow FSG  - Lantus 12U qHS, +2 CF insulin sliding scale    # Constipation - resolved   - C/w Miralax, senna daily    #Other  Diet: DASH/TLC, Renal  Activity: Increase as tolerated, LLE toe touch weight bearing with surgical shoe  GI PPX: None  DVT PPX: Heparin 5000U subQ q12h  Dispo: From home, pending debridement revision, final Abx recommendations        # Handoff   - pending placement for SNF w/ HD

## 2022-11-07 NOTE — PROGRESS NOTE ADULT - SUBJECTIVE AND OBJECTIVE BOX
Nephrology progress note  Patient is seen and examined, events over the last 24 h noted .  lying in bed comfortable     Allergies:  No Known Allergies    Hospital Medications:   MEDICATIONS  (STANDING):    aspirin enteric coated 81 milliGRAM(s) Oral daily  atorvastatin 40 milliGRAM(s) Oral at bedtime  Dakins Solution - 1/2 Strength 1 Application(s) Topical daily  DAPTOmycin IVPB 950 milliGRAM(s) IV Intermittent every 48 hours  darbepoetin Injectable Syringe 40 MICROGram(s) IV Push <User Schedule>  heparin   Injectable 5000 Unit(s) SubCutaneous every 12 hours  influenza   Vaccine 0.5 milliLiter(s) IntraMuscular once  insulin glargine Injectable (LANTUS) 12 Unit(s) SubCutaneous at bedtime  insulin lispro (ADMELOG) corrective regimen sliding scale   SubCutaneous three times a day before meals  insulin lispro Injectable (ADMELOG). 6 Unit(s) SubCutaneous once  NIFEdipine XL 30 milliGRAM(s) Oral daily  polyethylene glycol 3350 17 Gram(s) Oral two times a day  promethazine 25 milliGRAM(s) Oral once  senna 2 Tablet(s) Oral at bedtime  sevelamer carbonate 800 milliGRAM(s) Oral three times a day  tamsulosin 0.4 milliGRAM(s) Oral at bedtime        VITALS:  T(F): 96.3 (11-07-22 @ 04:58), Max: 98 (11-06-22 @ 13:32)  HR: 102 (11-07-22 @ 04:58)  BP: 143/65 (11-07-22 @ 04:58)  RR: 18 (11-07-22 @ 04:58)  SpO2: 100% (11-06-22 @ 21:01)      11-05 @ 08:01  -  11-06 @ 07:00  --------------------------------------------------------  IN: 360 mL / OUT: 550 mL / NET: -190 mL    11-06 @ 07:01  -  11-07 @ 07:00  --------------------------------------------------------  IN: 510 mL / OUT: 1400 mL / NET: -890 mL      06 Nov 2022 07:01  -  07 Nov 2022 07:00  --------------------------------------------------------  IN:    Oral Fluid: 510 mL  Total IN: 510 mL    OUT:    Voided (mL): 1400 mL  Total OUT: 1400 mL    Total NET: -890 mL            PHYSICAL EXAM:  Constitutional: NAD  Respiratory: CTAB,   Cardiovascular: S1, S2, RRR  Gastrointestinal: BS+, soft, NT/ND  Extremities: No cyanosis or clubbing. feet in dressing   :  No schneider.   Skin: No rashes    LABS:  11-07    134<L>  |  93<L>  |  64<HH>  ----------------------------<  98  5.1<H>   |  28  |  6.8<HH>    Ca    8.0<L>      07 Nov 2022 06:16  Mg     2.1     11-07    TPro  7.1  /  Alb  2.8<L>  /  TBili  0.3  /  DBili      /  AST  9   /  ALT  <5  /  AlkPhos  164<H>  11-07                          7.6    7.92  )-----------( 222      ( 07 Nov 2022 06:16 )             22.5       Urine Studies:        Iron 39, TIBC 133, %sat 29      [10-21-22 @ 10:30]  Ferritin 1126      [10-21-22 @ 10:30]  PTH -- (Ca 7.5)      [02-26-22 @ 16:00]   312  Vitamin D (25OH) 21      [02-26-22 @ 16:00]  HbA1c 5.8      [01-30-20 @ 06:46]  Lipid: chol 81, , HDL 22, LDL --      [10-15-22 @ 09:53]    HBsAb <3.0      [12-29-19 @ 17:44]  HBsAb Nonreact      [11-03-22 @ 06:40]  HBsAg Nonreact      [11-03-22 @ 06:40]  HBcAb Nonreact      [11-03-22 @ 06:40]  HCV 0.14, Nonreact      [10-27-22 @ 04:09]        RADIOLOGY & ADDITIONAL STUDIES:

## 2022-11-07 NOTE — PROGRESS NOTE ADULT - ASSESSMENT
63 y/o with a PMHx of DM, HTN, ESRD (on dialysis M/W/F, last session on 10/14), CAD s/p 1 stent on 2008 and quadruple CABG on 2012, PAD s/p bilateral angioplasty at [facility], was transferred from [facility] complaining of weakness and unhealed wound in the left foot.    # Left foot ulcer and Left calcaneous OM complicated by H/o ESBL E coli Bacteremia at [facility]   sp debridement   - on discharge, switch to cefepime 2g post HD and daptomycin 10 mg/kg post HD x first two sessions of the week and 12 mg/kg post HD on third session        # ESRD on HD      # H/o  urinary  retention    # H/o BPH   on flomax    # CAD s/p stent and CABG     #Drug monitoring of high risk medication , potential for drug drug reaction , requiring close monitoring check ck on weekly basis     #Hyponatremia no intervention indicated     # Hypertension   BP: 143/65 (07 Nov 2022 04:58) (113/59 - 161/76)  stable     #DM type 2  POCT Blood Glucose.: 125 mg/dL (07 Nov 2022 07:58)  POCT Blood Glucose.: 218 mg/dL (06 Nov 2022 21:08)  POCT Blood Glucose.: 246 mg/dL (06 Nov 2022 16:34)  POCT Blood Glucose.: 185 mg/dL (06 Nov 2022 11:03)  controlled    #Anemia likely due to anemia of chronic disease with no evidence of bleeding ,  stable    # DVT prophylaxis    # Full code    PROGRESS NOTE HANDOFF    Pending:  dc planning  with wound vac     Family discussion: patient verbalized understanding and agreeable to plan of care     Disposition: SNF

## 2022-11-08 ENCOUNTER — TRANSCRIPTION ENCOUNTER (OUTPATIENT)
Age: 62
End: 2022-11-08

## 2022-11-08 VITALS
DIASTOLIC BLOOD PRESSURE: 68 MMHG | RESPIRATION RATE: 19 BRPM | HEART RATE: 101 BPM | TEMPERATURE: 96 F | SYSTOLIC BLOOD PRESSURE: 128 MMHG

## 2022-11-08 LAB
ALBUMIN SERPL ELPH-MCNC: 2.9 G/DL — LOW (ref 3.5–5.2)
ALP SERPL-CCNC: 144 U/L — HIGH (ref 30–115)
ALT FLD-CCNC: <5 U/L — SIGNIFICANT CHANGE UP (ref 0–41)
ANION GAP SERPL CALC-SCNC: 13 MMOL/L — SIGNIFICANT CHANGE UP (ref 7–14)
AST SERPL-CCNC: 10 U/L — SIGNIFICANT CHANGE UP (ref 0–41)
BASOPHILS # BLD AUTO: 0.09 K/UL — SIGNIFICANT CHANGE UP (ref 0–0.2)
BASOPHILS NFR BLD AUTO: 1.3 % — HIGH (ref 0–1)
BILIRUB SERPL-MCNC: 0.4 MG/DL — SIGNIFICANT CHANGE UP (ref 0.2–1.2)
BUN SERPL-MCNC: 38 MG/DL — HIGH (ref 10–20)
CALCIUM SERPL-MCNC: 7.8 MG/DL — LOW (ref 8.4–10.5)
CHLORIDE SERPL-SCNC: 95 MMOL/L — LOW (ref 98–110)
CO2 SERPL-SCNC: 29 MMOL/L — SIGNIFICANT CHANGE UP (ref 17–32)
CREAT SERPL-MCNC: 5 MG/DL — CRITICAL HIGH (ref 0.7–1.5)
EGFR: 12 ML/MIN/1.73M2 — LOW
EOSINOPHIL # BLD AUTO: 0.34 K/UL — SIGNIFICANT CHANGE UP (ref 0–0.7)
EOSINOPHIL NFR BLD AUTO: 5.1 % — SIGNIFICANT CHANGE UP (ref 0–8)
GLUCOSE BLDC GLUCOMTR-MCNC: 151 MG/DL — HIGH (ref 70–99)
GLUCOSE BLDC GLUCOMTR-MCNC: 218 MG/DL — HIGH (ref 70–99)
GLUCOSE BLDC GLUCOMTR-MCNC: 92 MG/DL — SIGNIFICANT CHANGE UP (ref 70–99)
GLUCOSE SERPL-MCNC: 90 MG/DL — SIGNIFICANT CHANGE UP (ref 70–99)
HCT VFR BLD CALC: 23.2 % — LOW (ref 42–52)
HGB BLD-MCNC: 7.7 G/DL — LOW (ref 14–18)
IMM GRANULOCYTES NFR BLD AUTO: 0.3 % — SIGNIFICANT CHANGE UP (ref 0.1–0.3)
LYMPHOCYTES # BLD AUTO: 0.9 K/UL — LOW (ref 1.2–3.4)
LYMPHOCYTES # BLD AUTO: 13.4 % — LOW (ref 20.5–51.1)
MAGNESIUM SERPL-MCNC: 2.2 MG/DL — SIGNIFICANT CHANGE UP (ref 1.8–2.4)
MCHC RBC-ENTMCNC: 30.9 PG — SIGNIFICANT CHANGE UP (ref 27–31)
MCHC RBC-ENTMCNC: 33.2 G/DL — SIGNIFICANT CHANGE UP (ref 32–37)
MCV RBC AUTO: 93.2 FL — SIGNIFICANT CHANGE UP (ref 80–94)
MONOCYTES # BLD AUTO: 0.68 K/UL — HIGH (ref 0.1–0.6)
MONOCYTES NFR BLD AUTO: 10.1 % — HIGH (ref 1.7–9.3)
NEUTROPHILS # BLD AUTO: 4.67 K/UL — SIGNIFICANT CHANGE UP (ref 1.4–6.5)
NEUTROPHILS NFR BLD AUTO: 69.8 % — SIGNIFICANT CHANGE UP (ref 42.2–75.2)
NRBC # BLD: 0 /100 WBCS — SIGNIFICANT CHANGE UP (ref 0–0)
PLATELET # BLD AUTO: 233 K/UL — SIGNIFICANT CHANGE UP (ref 130–400)
POTASSIUM SERPL-MCNC: 4.6 MMOL/L — SIGNIFICANT CHANGE UP (ref 3.5–5)
POTASSIUM SERPL-SCNC: 4.6 MMOL/L — SIGNIFICANT CHANGE UP (ref 3.5–5)
PROT SERPL-MCNC: 7.1 G/DL — SIGNIFICANT CHANGE UP (ref 6–8)
RBC # BLD: 2.49 M/UL — LOW (ref 4.7–6.1)
RBC # FLD: 14.3 % — SIGNIFICANT CHANGE UP (ref 11.5–14.5)
SODIUM SERPL-SCNC: 137 MMOL/L — SIGNIFICANT CHANGE UP (ref 135–146)
WBC # BLD: 6.7 K/UL — SIGNIFICANT CHANGE UP (ref 4.8–10.8)
WBC # FLD AUTO: 6.7 K/UL — SIGNIFICANT CHANGE UP (ref 4.8–10.8)

## 2022-11-08 PROCEDURE — 99233 SBSQ HOSP IP/OBS HIGH 50: CPT

## 2022-11-08 RX ORDER — CEFEPIME 1 G/1
2000 INJECTION, POWDER, FOR SOLUTION INTRAMUSCULAR; INTRAVENOUS
Refills: 0 | Status: DISCONTINUED | OUTPATIENT
Start: 2022-11-08 | End: 2022-11-08

## 2022-11-08 RX ADMIN — POLYETHYLENE GLYCOL 3350 17 GRAM(S): 17 POWDER, FOR SOLUTION ORAL at 17:22

## 2022-11-08 RX ADMIN — Medication 81 MILLIGRAM(S): at 12:05

## 2022-11-08 RX ADMIN — HEPARIN SODIUM 5000 UNIT(S): 5000 INJECTION INTRAVENOUS; SUBCUTANEOUS at 17:21

## 2022-11-08 RX ADMIN — SEVELAMER CARBONATE 800 MILLIGRAM(S): 2400 POWDER, FOR SUSPENSION ORAL at 05:04

## 2022-11-08 RX ADMIN — HEPARIN SODIUM 5000 UNIT(S): 5000 INJECTION INTRAVENOUS; SUBCUTANEOUS at 05:03

## 2022-11-08 RX ADMIN — Medication 30 MILLIGRAM(S): at 05:04

## 2022-11-08 RX ADMIN — Medication 1 APPLICATION(S): at 12:05

## 2022-11-08 RX ADMIN — Medication 2: at 12:05

## 2022-11-08 RX ADMIN — Medication 4: at 17:20

## 2022-11-08 NOTE — DISCHARGE NOTE NURSING/CASE MANAGEMENT/SOCIAL WORK - NSDCPEFALRISK_GEN_ALL_CORE
For information on Fall & Injury Prevention, visit: [email or web address] OR  [email or web address] OR  https://www.cdc.gov/steadi/patient.html

## 2022-11-08 NOTE — PROGRESS NOTE ADULT - SUBJECTIVE AND OBJECTIVE BOX
Nephrology progress note    Patient was seen and examined, events over the last 24 h noted .    HD yesterday    Allergies:  No Known Allergies    Hospital Medications:   MEDICATIONS  (STANDING):  aspirin enteric coated 81 milliGRAM(s) Oral daily  atorvastatin 40 milliGRAM(s) Oral at bedtime  cefepime   IVPB 2000 milliGRAM(s) IV Intermittent <User Schedule>  Dakins Solution - 1/2 Strength 1 Application(s) Topical daily  DAPTOmycin IVPB 950 milliGRAM(s) IV Intermittent every 48 hours  darbepoetin Injectable Syringe 40 MICROGram(s) IV Push <User Schedule>  heparin   Injectable 5000 Unit(s) SubCutaneous every 12 hours  influenza   Vaccine 0.5 milliLiter(s) IntraMuscular once  insulin glargine Injectable (LANTUS) 12 Unit(s) SubCutaneous at bedtime  insulin lispro (ADMELOG) corrective regimen sliding scale   SubCutaneous three times a day before meals  insulin lispro Injectable (ADMELOG). 6 Unit(s) SubCutaneous once  NIFEdipine XL 30 milliGRAM(s) Oral daily  polyethylene glycol 3350 17 Gram(s) Oral two times a day  promethazine 25 milliGRAM(s) Oral once  senna 2 Tablet(s) Oral at bedtime  sevelamer carbonate 800 milliGRAM(s) Oral three times a day  tamsulosin 0.4 milliGRAM(s) Oral at bedtime        VITALS:  T(F): 96 (11-08-22 @ 13:56), Max: 97 (11-08-22 @ 04:46)  HR: 101 (11-08-22 @ 13:56)  BP: 128/68 (11-08-22 @ 13:56)  RR: 19 (11-08-22 @ 13:56)  SpO2: --  Wt(kg): --    11-06 @ 07:01  -  11-07 @ 07:00  --------------------------------------------------------  IN: 510 mL / OUT: 1400 mL / NET: -890 mL    11-07 @ 07:01  -  11-08 @ 07:00  --------------------------------------------------------  IN: 420 mL / OUT: 3000 mL / NET: -2580 mL          PHYSICAL EXAM:  Constitutional: NAD  HEENT: anicteric sclera, oropharynx clear, MMM  Neck: No JVD  Respiratory: CTAB, no wheezes, rales or rhonchi  Cardiovascular: S1, S2, RRR  Gastrointestinal: BS+, soft, NT/ND  Extremities: No cyanosis or clubbing. No peripheral edema  :  No schneider.   Skin: No rashes    LABS:  11-08    137  |  95<L>  |  38<H>  ----------------------------<  90  4.6   |  29  |  5.0<HH>    Ca    7.8<L>      08 Nov 2022 06:03  Mg     2.2     11-08    TPro  7.1  /  Alb  2.9<L>  /  TBili  0.4  /  DBili      /  AST  10  /  ALT  <5  /  AlkPhos  144<H>  11-08                          7.7    6.70  )-----------( 233      ( 08 Nov 2022 06:03 )             23.2       Urine Studies:      RADIOLOGY & ADDITIONAL STUDIES:

## 2022-11-08 NOTE — PROGRESS NOTE ADULT - PROVIDER SPECIALTY LIST ADULT
Hospitalist
Internal Medicine
Nephrology
Podiatry
Podiatry
Vascular Surgery
Hospitalist
Infectious Disease
Internal Medicine
Nephrology
Nephrology
Podiatry
Vascular Surgery
Cardiology
Hospitalist
Infectious Disease
Infectious Disease
Internal Medicine
Nephrology
Podiatry
Podiatry
Vascular Surgery
Vascular Surgery
Hospitalist
Hospitalist
Internal Medicine
Nephrology
Infectious Disease

## 2022-11-08 NOTE — PROGRESS NOTE ADULT - ASSESSMENT
63 y/o with a PMHx of DM, HTN, ESRD (on dialysis M/W/F, last session on 10/14), CAD s/p 1 stent on 2008 and quadruple CABG on 2012, PAD s/p bilateral angioplasty at [facility], was transferred from [facility] complaining of weakness and unhealed wound in the left foot.    # Left foot ulcer and Left calcaneous OM complicated by H/o ESBL E coli Bacteremia at [facility]   sp debridement   - on discharge, switch to cefepime 2g post HD and daptomycin 10 mg/kg post HD x first two sessions of the week and 12 mg/kg post HD on third session      # ESRD on HD      # H/o  urinary  retention    # H/o BPH   on flomax    # CAD s/p stent and CABG     #Drug monitoring of high risk medication , potential for drug drug reaction , requiring close monitoring check ck on weekly basis , ordered for am      #Hyponatremia resolved     # Hypertension   BP: 139/67 (08 Nov 2022 04:46) (130/67 - 145/65)  stable     #DM type 2  POCT Blood Glucose.: 92 mg/dL (08 Nov 2022 07:49)  POCT Blood Glucose.: 189 mg/dL (07 Nov 2022 21:08)  POCT Blood Glucose.: 270 mg/dL (07 Nov 2022 16:30)  controlled    #Anemia likely due to anemia of chronic disease with no evidence of bleeding ,  stable    # DVT prophylaxis    # Full code    PROGRESS NOTE HANDOFF    Pending:  dc planning     Family discussion: patient verbalized understanding and agreeable to plan of care     Disposition: SNF

## 2022-11-08 NOTE — PROGRESS NOTE ADULT - REASON FOR ADMISSION
Sepsis

## 2022-11-08 NOTE — DISCHARGE NOTE NURSING/CASE MANAGEMENT/SOCIAL WORK - PATIENT PORTAL LINK FT
You can access the FollowMyHealth Patient Portal offered by [facility] by registering at the following website: [email or web address]. By joining [employer]’s FollowMyHealth portal, you will also be able to view your health information using other applications (apps) compatible with our system.

## 2022-11-08 NOTE — PROGRESS NOTE ADULT - ASSESSMENT
61 y/o with a PMHx of DM, HTN, ESRD (on dialysis M/W/F, last session on 10/14), CAD s/p 1 stent on 2008 and quadruple CABG on 2012,     PAD s/p bilateral angioplasty at [facility], was transferred from [facility] complaining of weakness and unhealed wound in the left foot.  ESRD - HD tomorrow   last Phos noted , cont binders  URINARY retention -Non oliguric  (10/25/22)   -cont Flomax,  Anemia - on Aranesp 40 weekly, on ABx, no Venofer/ resistant in view of infection- Foot ulcer keep Hb >7 , will need blood tx if Hb < 7  PVD -non-healing left foot ulcer s/p LLE angiogram with peroneal angioplasty and atherectomy 10/27/22 on Daptomycin for now   	    will follow

## 2022-11-08 NOTE — PROGRESS NOTE ADULT - SUBJECTIVE AND OBJECTIVE BOX
INTERNAL MEDICINE PROGRESS NOTE  LAWRENCE SCHWABACHER 62y Male  MRN#: [id number]     Hospital Day: 25d  Pt is currently admitted with the primary diagnosis of Diabetic Foot Ulcer    SUBJECTIVE  Overnight events     Subjective complaints  Patient was evaluated this morning. Reports that wound vac was removed yesterday. Denies pain, fever, chills, n,v,d.  Patient informed that we are pending placement with HCS vs STR SNF.                                           OBJECTIVE  PAST MEDICAL & SURGICAL HISTORY  CAD (coronary artery disease)  1 stent 2008    S/P CABG (coronary artery bypass graft)  x4    Diabetes mellitus    Transient ischemic attack (TIA)  2017; 2008    Chronic kidney disease (CKD)  Stage IV    Hypertension    Stented coronary artery  in 2008    Myocardial infarction  2012    PAD (peripheral artery disease)  S/p bypass left leg    HLD (hyperlipidemia)    BPH (benign prostatic hyperplasia)    Pain in left knee  s/p fall    OA (osteoarthritis)    [place] (hard of hearing)    Chronic anemia    S/P CABG (coronary artery bypass graft)  2012    H/O arterial bypass of lower limb  Left Lower Extremity (2016)    History of surgery  Left CEA (2017)  Left Pinkie toe Amputation (2014)  CABG x 4 (2012)  Card cath - stent (2008)      AV fistula  2019  LEFT AV FISTULA                                                ALLERGIES:  No Known Allergies                           HOME MEDICATIONS  Home Medications:  aspirin 81 mg oral tablet: 1 tab(s) orally once a day (14 Sep 2022 17:42)  furosemide 40 mg oral tablet: 2 tab(s) orally once a day (14 Sep 2022 17:42)  Levemir 100 units/mL subcutaneous solution: 40 unit(s) subcutaneous once a day (at bedtime) (14 Sep 2022 17:42)  Metoprolol Tartrate 50 mg oral tablet: 1 tab(s) orally 2 times a day (14 Sep 2022 17:42)  Plavix 75 mg oral tablet: 1 tab(s) orally once a day (14 Sep 2022 17:42)  sevelamer carbonate 800 mg oral tablet: 1 tab(s) orally 3 times a day (with meals) (20 Sep 2022 11:36)  Trulicity Pen 1.5 mg/0.5 mL subcutaneous solution: 1.5  subcutaneous once a week (14 Sep 2022 17:42)                           MEDICATIONS:  MEDICATIONS  (STANDING):  aspirin enteric coated 81 milliGRAM(s) Oral daily  atorvastatin 40 milliGRAM(s) Oral at bedtime  Dakins Solution - 1/2 Strength 1 Application(s) Topical daily  DAPTOmycin IVPB 950 milliGRAM(s) IV Intermittent every 48 hours  darbepoetin Injectable Syringe 40 MICROGram(s) IV Push <User Schedule>  heparin   Injectable 5000 Unit(s) SubCutaneous every 12 hours  influenza   Vaccine 0.5 milliLiter(s) IntraMuscular once  insulin glargine Injectable (LANTUS) 12 Unit(s) SubCutaneous at bedtime  insulin lispro (ADMELOG) corrective regimen sliding scale   SubCutaneous three times a day before meals  insulin lispro Injectable (ADMELOG). 6 Unit(s) SubCutaneous once  NIFEdipine XL 30 milliGRAM(s) Oral daily  polyethylene glycol 3350 17 Gram(s) Oral two times a day  promethazine 25 milliGRAM(s) Oral once  senna 2 Tablet(s) Oral at bedtime  sevelamer carbonate 800 milliGRAM(s) Oral three times a day  tamsulosin 0.4 milliGRAM(s) Oral at bedtime    MEDICATIONS  (PRN):  acetaminophen     Tablet .. 650 milliGRAM(s) Oral every 6 hours PRN Temp greater or equal to 38C (100.4F), Mild Pain (1 - 3)  aluminum hydroxide/magnesium hydroxide/simethicone Suspension 30 milliLiter(s) Oral every 4 hours PRN Dyspepsia  melatonin 3 milliGRAM(s) Oral at bedtime PRN Insomnia  ondansetron Injectable 4 milliGRAM(s) IV Push every 8 hours PRN Nausea and/or Vomiting  oxycodone    5 mG/acetaminophen 325 mG 1 Tablet(s) Oral every 6 hours PRN Moderate Pain (4 - 6)                                              ------------------------------------------------------------  VITAL SIGNS: Last 24 Hours  Vital Signs Last 24 Hrs  T(C): 36.1 (08 Nov 2022 04:46), Max: 36.1 (08 Nov 2022 04:46)  T(F): 97 (08 Nov 2022 04:46), Max: 97 (08 Nov 2022 04:46)  HR: 97 (08 Nov 2022 04:46) (97 - 107)  BP: 139/67 (08 Nov 2022 04:46) (130/67 - 148/66)  BP(mean): --  RR: 18 (08 Nov 2022 04:46) (18 - 18)  SpO2: --                                                 LABS:                        7.7    6.70  )-----------( 233      ( 08 Nov 2022 06:03 )             23.2       11-08    137  |  95<L>  |  38<H>  ----------------------------<  90  4.6   |  29  |  5.0<HH>    Ca    7.8<L>      08 Nov 2022 06:03  Mg     2.2     11-08    TPro  7.1  /  Alb  2.9<L>  /  TBili  0.4  /  DBili  x   /  AST  10  /  ALT  <5  /  AlkPhos  144<H>  11-08                CAPILLARY BLOOD GLUCOSE      POCT Blood Glucose.: 92 mg/dL (08 Nov 2022 07:49)                                  7.6    7.92  )-----------( 222      ( 07 Nov 2022 06:16 )             22.5     11-07    134<L>  |  93<L>  |  64<HH>  ----------------------------<  98  5.1<H>   |  28  |  6.8<HH>    Ca    8.0<L>      07 Nov 2022 06:16  Mg     2.1     11-07    TPro  7.1  /  Alb  2.8<L>  /  TBili  0.3  /  DBili  x   /  AST  9   /  ALT  <5  /  AlkPhos  164<H>  11-07                                                              RADIOLOGY:    PHYSICAL EXAM:  GENERAL: NAD, sitting in bed comfortably  HEAD:  Atraumatic, Normocephalic  EYES: EOMI  CHEST/LUNG: Clear to auscultation bilaterally  HEART: Regular rate and rhythm; No murmurs, rubs, or gallops  ABDOMEN: Bowel Sounds present; Soft, nontender, nondistended  EXTREMITIES: Left leg wrapped in ace bandage, clean dressing.  2+ Peripheral Pulses, brisk capillary refill. No clubbing, cyanosis, or edema  NERVOUS SYSTEM:  A&Ox3, no focal deficits   SKIN: No rashes or lesions                                       ASSESSMENT & PLAN  LAWRENCE SCHWABACHER is a 62y Male with a history significant for DM, HTN, ESRD (on dialysis M/W/F, last session on 10/14), CAD s/p 1 stent on 2008 and quadruple CABG on 2012, PAD s/p bilateral angioplasty at [facility], was transferred from [facility] complaining of weakness and unhealed wound in the left foot. Pending placement HCS vs STR SNF.    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 hx of PAD   - IR for angiogram 10/20/22: a) Left lower extremity angiogram demonstrating chronically occluded PT and AT with patent anterior tibial vein bypass reconstituting into the DP.  - Blood cx 10/15:NTD   - treated with two weeks post-HD vancomycin + cefepime  - wound CX 10/16 VRE Faecim, Enterobacter cloacae complex, Proteus vulgaris, Morganella morganii   - s/p debridement 10/21 Wound Cx Proteus mirabilis, VRE faecium, Pseudomonas putida  - MRI L foot 10/17: a) Osteomyelitis and erosion of the calcaneus with a vertical pathologic fracture extending to the subtalar joint. Gangrene of overlying subcutaneous tissues. 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  - ID consult - will likely need at least 6 weeks from last debridement for calcaneal OM  - Cont meropenem 500mg IV daily   - C/w daptomycin 12 mg/kg q 48 hours to cover VRE faecium   - Creatine Kinase, Serum: 15 U/L (10.26.22 @ 06:53)  - Creatine Kinase, Serum: 23 U/L (11.1.22 @ 08:13) -- check CK weekly 2/2 daptomycin use (next check 11/9)  - Podiatry consult:  a) Local Wound Care; Wound Flushed w/ NS; Wound Packed w/ xeroform/ ABD x2 / DSD / Kerlix / Ace bandage  b) Weight Bearing Status; WBAT w/ Heel Touch w/ Surgical Shoe;   c) Continue w/ Local Wound Care; Q24 Dressing Changes;  - S/p Initial debridement 10/21, S/p 10/24/22 excisional debridement of soft tissue and bone of left foot; (pt given 1u pRBCs pre-op)  - S/p 10/27 left lower extremity angiogram, peroneal artery angioplasty and atherectomy 2/ occluded segment found on previous angiogram with Dr. Moore  - S/p 3rd surgical debridement with Podiatry  - ID recs post-discharge: switch to cefepime 2g post HD and daptomycin 10 mg/kg post HD x first two sessions of the week and 12 mg/kg post HD on third session,  Weekly CBC, CMP, ESR/CRP, and CK measurements while on Dapto. Will need antibiotics for 4 weeks since last debridement (11/3-11/30)  - ID follow-up with Dr. Gomez Mathur for Telehealth. ID will call the patient between 10:30-1:30.  - Podiatry removed wound vac on 11/7  -  f/u with Dr. Nikolai Pacheco 1 week post-discharge    #Dysuria - r/o UTI - improving   #H/o BPH  - Pt already receiving meropenem - should cover most UTI causing bacteria  - UA 10/23 WBC 13, nitrite negative, LEC small, bacteria negative   - 10/25 urine cx (-)  - 10/25 Kidney & Bladder sonogram (-) for hydronephrosis   -  cc, BPH 89 cc (10/25/22)  - DC fluconazole (10/25-10/28) in case of fungal origin 5 d. course per ID - stopped 10/28 2/ QTC   - EKG 10/25: . --> 10/27 . --> 10/28   - Per nephro d/c phenazopyridine in HD patient  - Started Flomax 0.4mg PO q24h  - Repeat bladder scan (11/1): 17cc.    #HTN  - C/w nifedipine 30mg PO q24h    # ESRD on HD  - HD as per nephrology   - C/w darbepoetin  - C/w Sevelamer 800mg TID    # CAD s/p stent and CABG   - Lipitor 40mg qHS  - Discuss with cardio. we're unable to hold aspirin. Cont aspirin   - holding plavix since 10/16  - Continue BB  - Cardio consult 10/19 appreciated at moderate risk for surgery     # DM II  - Follow FSG  - Lantus 12U qHS, +2 CF insulin sliding scale    # Constipation - resolved   - C/w Miralax, senna daily    #Other  Diet: DASH/TLC, Renal  Activity: Increase as tolerated, LLE toe touch weight bearing with surgical shoe  GI PPX: None  DVT PPX: Heparin 5000U subQ q12h  Dispo: From home, pending debridement revision, final Abx recommendations        # Handoff   - pending placement for HCS vs SNF w/ HD

## 2022-11-08 NOTE — PROGRESS NOTE ADULT - SUBJECTIVE AND OBJECTIVE BOX
SCHWABACHER, LAWRENCE  62y  [facility] [id number]      Patient is a 62y old  Male who presents with a chief complaint of Sepsis (08 Nov 2022 07:47)      INTERVAL HPI/OVERNIGHT EVENTS:    no events overnight     REVIEW OF SYSTEMS:  CONSTITUTIONAL: No fever, weight loss, or fatigue  EYES: No eye pain, visual disturbances, or discharge  ENMT:  No difficulty hearing, tinnitus, vertigo; No sinus or throat pain  NECK: No pain or stiffness  BREASTS: No pain, masses, or nipple discharge  RESPIRATORY: No cough, wheezing, chills or hemoptysis; No shortness of breath  CARDIOVASCULAR: No chest pain, palpitations, dizziness, or leg swelling  GASTROINTESTINAL: No abdominal or epigastric pain. No nausea, vomiting, or hematemesis; No diarrhea or constipation. No melena or hematochezia.  GENITOURINARY: No dysuria, frequency, hematuria, or incontinence  NEUROLOGICAL: No headaches, memory loss, loss of strength, numbness, or tremors  SKIN: No itching, burning, rashes, or lesions   LYMPH NODES: No enlarged glands  ENDOCRINE: No heat or cold intolerance; No hair loss  MUSCULOSKELETAL: No joint pain or swelling; No muscle, back, or extremity pain  PSYCHIATRIC: No depression, anxiety, mood swings, or difficulty sleeping  HEME/LYMPH: No easy bruising, or bleeding gums  ALLERY AND IMMUNOLOGIC: No hives or eczema  FAMILY HISTORY:  Family history of heart disease (Father)    DM (diabetes mellitus) (Mother)    ESRD (end stage renal disease) on dialysis (Mother)      T(C): 36.1 (11-08-22 @ 04:46), Max: 36.1 (11-08-22 @ 04:46)  HR: 97 (11-08-22 @ 04:46) (97 - 107)  BP: 139/67 (11-08-22 @ 04:46) (130/67 - 145/65)  RR: 18 (11-08-22 @ 04:46) (18 - 18)  SpO2: --  Wt(kg): --Vital Signs Last 24 Hrs  T(C): 36.1 (08 Nov 2022 04:46), Max: 36.1 (08 Nov 2022 04:46)  T(F): 97 (08 Nov 2022 04:46), Max: 97 (08 Nov 2022 04:46)  HR: 97 (08 Nov 2022 04:46) (97 - 107)  BP: 139/67 (08 Nov 2022 04:46) (130/67 - 145/65)  BP(mean): --  RR: 18 (08 Nov 2022 04:46) (18 - 18)  SpO2: --        PHYSICAL EXAM:  GENERAL: NAD, well-groomed, well-developed  HEAD:  Atraumatic, Normocephalic  EYES: EOMI, PERRLA, conjunctiva and sclera clear  ENMT: No tonsillar erythema, exudates, or enlargement; Moist mucous membranes, Good dentition, No lesions  NECK: Supple, No JVD, Normal thyroid  NERVOUS SYSTEM:  Alert & Oriented X3, Good concentration; Motor Strength 5/5 B/L upper and lower extremities; DTRs 2+ intact and symmetric  PULM: Clear to auscultation bilaterally  CARDIAC: Regular rate and rhythm; No murmurs, rubs, or gallops  GI: Soft, Nontender, Nondistended; Bowel sounds present  EXTREMITIES:  Left foot dressed   LYMPH: No lymphadenopathy noted  SKIN: No rashes or lesions    Consultant(s) Notes Reviewed:  [x ] YES  [ ] NO  Care Discussed with Consultants/Other Providers [ x] YES  [ ] NO    LABS:                            7.7    6.70  )-----------( 233      ( 08 Nov 2022 06:03 )             23.2   11-08    137  |  95<L>  |  38<H>  ----------------------------<  90  4.6   |  29  |  5.0<HH>    Ca    7.8<L>      08 Nov 2022 06:03  Mg     2.2     11-08    TPro  7.1  /  Alb  2.9<L>  /  TBili  0.4  /  DBili  x   /  AST  10  /  ALT  <5  /  AlkPhos  144<H>  11-08            acetaminophen     Tablet .. 650 milliGRAM(s) Oral every 6 hours PRN  aluminum hydroxide/magnesium hydroxide/simethicone Suspension 30 milliLiter(s) Oral every 4 hours PRN  aspirin enteric coated 81 milliGRAM(s) Oral daily  atorvastatin 40 milliGRAM(s) Oral at bedtime  Dakins Solution - 1/2 Strength 1 Application(s) Topical daily  DAPTOmycin IVPB 950 milliGRAM(s) IV Intermittent every 48 hours  darbepoetin Injectable Syringe 40 MICROGram(s) IV Push <User Schedule>  heparin   Injectable 5000 Unit(s) SubCutaneous every 12 hours  influenza   Vaccine 0.5 milliLiter(s) IntraMuscular once  insulin glargine Injectable (LANTUS) 12 Unit(s) SubCutaneous at bedtime  insulin lispro (ADMELOG) corrective regimen sliding scale   SubCutaneous three times a day before meals  insulin lispro Injectable (ADMELOG). 6 Unit(s) SubCutaneous once  melatonin 3 milliGRAM(s) Oral at bedtime PRN  NIFEdipine XL 30 milliGRAM(s) Oral daily  ondansetron Injectable 4 milliGRAM(s) IV Push every 8 hours PRN  oxycodone    5 mG/acetaminophen 325 mG 1 Tablet(s) Oral every 6 hours PRN  polyethylene glycol 3350 17 Gram(s) Oral two times a day  promethazine 25 milliGRAM(s) Oral once  senna 2 Tablet(s) Oral at bedtime  sevelamer carbonate 800 milliGRAM(s) Oral three times a day  tamsulosin 0.4 milliGRAM(s) Oral at bedtime      HEALTH ISSUES - PROBLEM Dx:          Case Discussed with House Staff    .   Spectra [phone]

## 2022-11-13 ENCOUNTER — INPATIENT (INPATIENT)
Facility: HOSPITAL | Age: 62
LOS: 24 days | Discharge: NURSING, REHAB OR LONG-TERM CARE | End: 2022-12-08
Attending: HOSPITALIST | Admitting: HOSPITALIST
Payer: MEDICARE

## 2022-11-13 VITALS
SYSTOLIC BLOOD PRESSURE: 128 MMHG | TEMPERATURE: 97 F | DIASTOLIC BLOOD PRESSURE: 72 MMHG | HEIGHT: 69 IN | HEART RATE: 102 BPM | WEIGHT: 190.04 LBS | OXYGEN SATURATION: 96 % | RESPIRATION RATE: 20 BRPM

## 2022-11-13 DIAGNOSIS — Z98.890 OTHER SPECIFIED POSTPROCEDURAL STATES: Chronic | ICD-10-CM

## 2022-11-13 DIAGNOSIS — Z95.828 PRESENCE OF OTHER VASCULAR IMPLANTS AND GRAFTS: Chronic | ICD-10-CM

## 2022-11-13 DIAGNOSIS — I77.0 ARTERIOVENOUS FISTULA, ACQUIRED: Chronic | ICD-10-CM

## 2022-11-13 DIAGNOSIS — Z95.1 PRESENCE OF AORTOCORONARY BYPASS GRAFT: Chronic | ICD-10-CM

## 2022-11-13 LAB
BASE EXCESS BLDV CALC-SCNC: 0.9 MMOL/L — SIGNIFICANT CHANGE UP (ref -2–3)
CA-I SERPL-SCNC: 1.06 MMOL/L — LOW (ref 1.15–1.33)
GAS PNL BLDV: 125 MMOL/L — LOW (ref 136–145)
GAS PNL BLDV: SIGNIFICANT CHANGE UP
HCO3 BLDV-SCNC: 25 MMOL/L — SIGNIFICANT CHANGE UP (ref 22–29)
HCT VFR BLDA CALC: 48 % — SIGNIFICANT CHANGE UP (ref 39–51)
HGB BLD CALC-MCNC: 15.9 G/DL — SIGNIFICANT CHANGE UP (ref 12.6–17.4)
LACTATE BLDV-MCNC: 1.7 MMOL/L — SIGNIFICANT CHANGE UP (ref 0.5–2)
PCO2 BLDV: 39 MMHG — LOW (ref 42–55)
PH BLDV: 7.42 — SIGNIFICANT CHANGE UP (ref 7.32–7.43)
PO2 BLDV: 53 MMHG — SIGNIFICANT CHANGE UP
POTASSIUM BLDV-SCNC: 4.7 MMOL/L — SIGNIFICANT CHANGE UP (ref 3.5–5.1)
SAO2 % BLDV: 81.1 % — SIGNIFICANT CHANGE UP
SARS-COV-2 RNA SPEC QL NAA+PROBE: SIGNIFICANT CHANGE UP

## 2022-11-13 PROCEDURE — 71045 X-RAY EXAM CHEST 1 VIEW: CPT | Mod: 26

## 2022-11-13 PROCEDURE — 99285 EMERGENCY DEPT VISIT HI MDM: CPT

## 2022-11-13 PROCEDURE — 93010 ELECTROCARDIOGRAM REPORT: CPT

## 2022-11-13 NOTE — ED ADULT NURSE NOTE - CHIEF COMPLAINT QUOTE
BIBA with complaints of chest pain tonight and cough x 3 weeks. Releived with NTG SL x 2. Sent from nursing home

## 2022-11-13 NOTE — ED PROVIDER NOTE - NS ED ROS FT
Constitutional: See HPI.  Eyes: No visual changes  ENMT: No neck pain   Cardiac: see hpi  Respiratory: No cough   GI: No nausea, vomiting, diarrhea or abdominal pain.  MS: No myalgia, muscle weakness, joint pain or back pain.  Psych: No suicidal or homicidal ideations.  Neuro: No headache   Skin: No skin rash.

## 2022-11-13 NOTE — ED PROVIDER NOTE - CLINICAL SUMMARY MEDICAL DECISION MAKING FREE TEXT BOX
62 year old male with a pmh of DM HTN ESRD (M/W/F , last dialysis friday) CAD stent CABG PAD, recent admission for L foot ulcer left calcaneal osteomyelitis presents here from NH via ambulance for chest pain. Patient states he suddenly began having midsternal chest pain 8/10 non radiating improved with acetaminophen and 3 sublingual by ems and now pain has resolved. No fever chills n/v/d abdominal pain. vs reviewed labs imaging ekg obtained and reviewed, prbc ordered, cardiology/cardio tele consulted evaluated recommended med tele.

## 2022-11-13 NOTE — ED PROVIDER NOTE - PHYSICAL EXAMINATION
CONSTITUTIONAL: WA / WN / NAD  HEAD: NCAT  EYES: PERRL; EOMI;   ENT: Normal pharynx; mucous membranes pink/moist, no erythema.  NECK: Supple; no meningeal signs  CARD: RRR; nl S1/S2; no M/R/G.   RESP: Respiratory rate and effort are normal; breath sounds clear and equal bilaterally.  ABD: Soft, NT ND  MSK/EXT: No gross deformities; full range of motion. + left arm av fistula  SKIN: Warm and dry;   NEURO: AAOx3  PSYCH: Memory Intact, Normal Affect

## 2022-11-13 NOTE — ED ADULT TRIAGE NOTE - CHIEF COMPLAINT QUOTE
BIBA with complaints of chest pain tonight and cough x 3 weeks. BIBA with complaints of chest pain tonight and cough x 3 weeks. Releived with NTG SL x 2. Sent from nursing home

## 2022-11-13 NOTE — ED PROVIDER NOTE - PROGRESS NOTE DETAILS
SR: spoke with cardiology for cards tele, recommended to speak with cardio tele np , which evaluated and states private patient of danielle recommends med tele. sr: spoke with cardiology fellow, will keep on med tele.

## 2022-11-13 NOTE — ED PROVIDER NOTE - OBJECTIVE STATEMENT
62 year old male with a pmh of DM HTN ESRD (M/W/F , last dialysis friday) CAD stent CABG PAD, recent admission for L foot ulcer left calcaneal osteomyelitis presents here from NH via ambulance for chest pain. Patient states he suddenly began having midsternal chest pain 8/10 non radiating improved with acetaminophen and 3 sublingual by ems and now pain has resolved. No fever chills n/v/d abdominal pain. 62 year old male with a pmh of DM HTN ESRD (M/W/F , last dialysis friday) CAD stent CABG PAD, recent admission for L foot ulcer left calcaneal osteomyelitis presents here from NH via ambulance for chest pain. Patient states he suddenly began having midsternal chest pain 8/10 non radiating improved with acetaminophen and 3 sublingual nitro by ems and now pain has resolved. No fever chills n/v/d abdominal pain.

## 2022-11-14 LAB
ALBUMIN SERPL ELPH-MCNC: 3.2 G/DL — LOW (ref 3.5–5.2)
ALP SERPL-CCNC: 175 U/L — HIGH (ref 30–115)
ALT FLD-CCNC: <5 U/L — SIGNIFICANT CHANGE UP (ref 0–41)
ANION GAP SERPL CALC-SCNC: 19 MMOL/L — HIGH (ref 7–14)
ANION GAP SERPL CALC-SCNC: 20 MMOL/L — HIGH (ref 7–14)
AST SERPL-CCNC: 9 U/L — SIGNIFICANT CHANGE UP (ref 0–41)
BASOPHILS # BLD AUTO: 0.08 K/UL — SIGNIFICANT CHANGE UP (ref 0–0.2)
BASOPHILS NFR BLD AUTO: 0.9 % — SIGNIFICANT CHANGE UP (ref 0–1)
BILIRUB SERPL-MCNC: 0.4 MG/DL — SIGNIFICANT CHANGE UP (ref 0.2–1.2)
BLD GP AB SCN SERPL QL: SIGNIFICANT CHANGE UP
BUN SERPL-MCNC: 60 MG/DL — HIGH (ref 10–20)
BUN SERPL-MCNC: 65 MG/DL — CRITICAL HIGH (ref 10–20)
CALCIUM SERPL-MCNC: 8.2 MG/DL — LOW (ref 8.4–10.5)
CALCIUM SERPL-MCNC: 8.3 MG/DL — LOW (ref 8.4–10.5)
CHLORIDE SERPL-SCNC: 88 MMOL/L — LOW (ref 98–110)
CHLORIDE SERPL-SCNC: 90 MMOL/L — LOW (ref 98–110)
CK MB CFR SERPL CALC: 2.3 NG/ML — SIGNIFICANT CHANGE UP (ref 0.6–6.3)
CK MB CFR SERPL CALC: 2.8 NG/ML — SIGNIFICANT CHANGE UP (ref 0.6–6.3)
CK SERPL-CCNC: 35 U/L — SIGNIFICANT CHANGE UP (ref 0–225)
CO2 SERPL-SCNC: 23 MMOL/L — SIGNIFICANT CHANGE UP (ref 17–32)
CO2 SERPL-SCNC: 23 MMOL/L — SIGNIFICANT CHANGE UP (ref 17–32)
CREAT SERPL-MCNC: 6 MG/DL — CRITICAL HIGH (ref 0.7–1.5)
CREAT SERPL-MCNC: 6.3 MG/DL — CRITICAL HIGH (ref 0.7–1.5)
CRP SERPL-MCNC: 80.8 MG/L — HIGH
EGFR: 10 ML/MIN/1.73M2 — LOW
EGFR: 9 ML/MIN/1.73M2 — LOW
EOSINOPHIL # BLD AUTO: 0.12 K/UL — SIGNIFICANT CHANGE UP (ref 0–0.7)
EOSINOPHIL NFR BLD AUTO: 1.4 % — SIGNIFICANT CHANGE UP (ref 0–8)
ERYTHROCYTE [SEDIMENTATION RATE] IN BLOOD: 118 MM/HR — HIGH (ref 0–10)
GLUCOSE BLDC GLUCOMTR-MCNC: 121 MG/DL — HIGH (ref 70–99)
GLUCOSE BLDC GLUCOMTR-MCNC: 143 MG/DL — HIGH (ref 70–99)
GLUCOSE BLDC GLUCOMTR-MCNC: 197 MG/DL — HIGH (ref 70–99)
GLUCOSE BLDC GLUCOMTR-MCNC: 86 MG/DL — SIGNIFICANT CHANGE UP (ref 70–99)
GLUCOSE SERPL-MCNC: 125 MG/DL — HIGH (ref 70–99)
GLUCOSE SERPL-MCNC: 159 MG/DL — HIGH (ref 70–99)
HCT VFR BLD CALC: 19.6 % — LOW (ref 42–52)
HCT VFR BLD CALC: 23.3 % — LOW (ref 42–52)
HCT VFR BLD CALC: 24.3 % — LOW (ref 42–52)
HGB BLD-MCNC: 6.6 G/DL — CRITICAL LOW (ref 14–18)
HGB BLD-MCNC: 7.6 G/DL — LOW (ref 14–18)
HGB BLD-MCNC: 8.2 G/DL — LOW (ref 14–18)
IMM GRANULOCYTES NFR BLD AUTO: 0.4 % — HIGH (ref 0.1–0.3)
LYMPHOCYTES # BLD AUTO: 0.73 K/UL — LOW (ref 1.2–3.4)
LYMPHOCYTES # BLD AUTO: 8.6 % — LOW (ref 20.5–51.1)
MAGNESIUM SERPL-MCNC: 2.3 MG/DL — SIGNIFICANT CHANGE UP (ref 1.8–2.4)
MCHC RBC-ENTMCNC: 30 PG — SIGNIFICANT CHANGE UP (ref 27–31)
MCHC RBC-ENTMCNC: 31.1 PG — HIGH (ref 27–31)
MCHC RBC-ENTMCNC: 31.3 PG — HIGH (ref 27–31)
MCHC RBC-ENTMCNC: 32.6 G/DL — SIGNIFICANT CHANGE UP (ref 32–37)
MCHC RBC-ENTMCNC: 33.7 G/DL — SIGNIFICANT CHANGE UP (ref 32–37)
MCHC RBC-ENTMCNC: 33.7 G/DL — SIGNIFICANT CHANGE UP (ref 32–37)
MCV RBC AUTO: 92 FL — SIGNIFICANT CHANGE UP (ref 80–94)
MCV RBC AUTO: 92.1 FL — SIGNIFICANT CHANGE UP (ref 80–94)
MCV RBC AUTO: 92.9 FL — SIGNIFICANT CHANGE UP (ref 80–94)
MONOCYTES # BLD AUTO: 0.57 K/UL — SIGNIFICANT CHANGE UP (ref 0.1–0.6)
MONOCYTES NFR BLD AUTO: 6.7 % — SIGNIFICANT CHANGE UP (ref 1.7–9.3)
NEUTROPHILS # BLD AUTO: 6.97 K/UL — HIGH (ref 1.4–6.5)
NEUTROPHILS NFR BLD AUTO: 82 % — HIGH (ref 42.2–75.2)
NRBC # BLD: 0 /100 WBCS — SIGNIFICANT CHANGE UP (ref 0–0)
NT-PROBNP SERPL-SCNC: HIGH PG/ML (ref 0–300)
PHOSPHATE SERPL-MCNC: 4.6 MG/DL — SIGNIFICANT CHANGE UP (ref 2.1–4.9)
PLATELET # BLD AUTO: 291 K/UL — SIGNIFICANT CHANGE UP (ref 130–400)
PLATELET # BLD AUTO: 295 K/UL — SIGNIFICANT CHANGE UP (ref 130–400)
PLATELET # BLD AUTO: 306 K/UL — SIGNIFICANT CHANGE UP (ref 130–400)
POTASSIUM SERPL-MCNC: 5 MMOL/L — SIGNIFICANT CHANGE UP (ref 3.5–5)
POTASSIUM SERPL-MCNC: 6.1 MMOL/L — CRITICAL HIGH (ref 3.5–5)
POTASSIUM SERPL-SCNC: 5 MMOL/L — SIGNIFICANT CHANGE UP (ref 3.5–5)
POTASSIUM SERPL-SCNC: 6.1 MMOL/L — CRITICAL HIGH (ref 3.5–5)
PROT SERPL-MCNC: 7.5 G/DL — SIGNIFICANT CHANGE UP (ref 6–8)
RBC # BLD: 2.11 M/UL — LOW (ref 4.7–6.1)
RBC # BLD: 2.53 M/UL — LOW (ref 4.7–6.1)
RBC # BLD: 2.64 M/UL — LOW (ref 4.7–6.1)
RBC # FLD: 14.4 % — SIGNIFICANT CHANGE UP (ref 11.5–14.5)
RBC # FLD: 14.5 % — SIGNIFICANT CHANGE UP (ref 11.5–14.5)
RBC # FLD: 14.7 % — HIGH (ref 11.5–14.5)
SODIUM SERPL-SCNC: 131 MMOL/L — LOW (ref 135–146)
SODIUM SERPL-SCNC: 132 MMOL/L — LOW (ref 135–146)
TROPONIN T SERPL-MCNC: 0.7 NG/ML — CRITICAL HIGH
TROPONIN T SERPL-MCNC: 0.74 NG/ML — CRITICAL HIGH
TROPONIN T SERPL-MCNC: 0.75 NG/ML — CRITICAL HIGH
TROPONIN T SERPL-MCNC: 0.8 NG/ML — CRITICAL HIGH
WBC # BLD: 6.83 K/UL — SIGNIFICANT CHANGE UP (ref 4.8–10.8)
WBC # BLD: 7.96 K/UL — SIGNIFICANT CHANGE UP (ref 4.8–10.8)
WBC # BLD: 8.5 K/UL — SIGNIFICANT CHANGE UP (ref 4.8–10.8)
WBC # FLD AUTO: 6.83 K/UL — SIGNIFICANT CHANGE UP (ref 4.8–10.8)
WBC # FLD AUTO: 7.96 K/UL — SIGNIFICANT CHANGE UP (ref 4.8–10.8)
WBC # FLD AUTO: 8.5 K/UL — SIGNIFICANT CHANGE UP (ref 4.8–10.8)

## 2022-11-14 PROCEDURE — 99233 SBSQ HOSP IP/OBS HIGH 50: CPT

## 2022-11-14 PROCEDURE — 93010 ELECTROCARDIOGRAM REPORT: CPT

## 2022-11-14 RX ORDER — DARBEPOETIN ALFA IN POLYSORBAT 200MCG/0.4
60 PEN INJECTOR (ML) SUBCUTANEOUS
Refills: 0 | Status: DISCONTINUED | OUTPATIENT
Start: 2022-11-14 | End: 2022-12-08

## 2022-11-14 RX ORDER — TAMSULOSIN HYDROCHLORIDE 0.4 MG/1
0.4 CAPSULE ORAL AT BEDTIME
Refills: 0 | Status: DISCONTINUED | OUTPATIENT
Start: 2022-11-14 | End: 2022-12-08

## 2022-11-14 RX ORDER — SODIUM CHLORIDE 9 MG/ML
1000 INJECTION, SOLUTION INTRAVENOUS
Refills: 0 | Status: DISCONTINUED | OUTPATIENT
Start: 2022-11-14 | End: 2022-11-19

## 2022-11-14 RX ORDER — FUROSEMIDE 40 MG
40 TABLET ORAL DAILY
Refills: 0 | Status: DISCONTINUED | OUTPATIENT
Start: 2022-11-14 | End: 2022-12-08

## 2022-11-14 RX ORDER — SEVELAMER CARBONATE 2400 MG/1
800 POWDER, FOR SUSPENSION ORAL
Refills: 0 | Status: DISCONTINUED | OUTPATIENT
Start: 2022-11-14 | End: 2022-12-08

## 2022-11-14 RX ORDER — FAMOTIDINE 10 MG/ML
20 INJECTION INTRAVENOUS DAILY
Refills: 0 | Status: DISCONTINUED | OUTPATIENT
Start: 2022-11-14 | End: 2022-12-05

## 2022-11-14 RX ORDER — CEFEPIME 1 G/1
2000 INJECTION, POWDER, FOR SOLUTION INTRAMUSCULAR; INTRAVENOUS
Refills: 0 | Status: COMPLETED | OUTPATIENT
Start: 2022-11-14 | End: 2022-11-21

## 2022-11-14 RX ORDER — DEXTROSE 50 % IN WATER 50 %
25 SYRINGE (ML) INTRAVENOUS ONCE
Refills: 0 | Status: DISCONTINUED | OUTPATIENT
Start: 2022-11-14 | End: 2022-11-19

## 2022-11-14 RX ORDER — INSULIN LISPRO 100/ML
VIAL (ML) SUBCUTANEOUS
Refills: 0 | Status: DISCONTINUED | OUTPATIENT
Start: 2022-11-14 | End: 2022-11-19

## 2022-11-14 RX ORDER — INSULIN GLARGINE 100 [IU]/ML
30 INJECTION, SOLUTION SUBCUTANEOUS AT BEDTIME
Refills: 0 | Status: DISCONTINUED | OUTPATIENT
Start: 2022-11-14 | End: 2022-11-17

## 2022-11-14 RX ORDER — HEPARIN SODIUM 5000 [USP'U]/ML
5000 INJECTION INTRAVENOUS; SUBCUTANEOUS EVERY 8 HOURS
Refills: 0 | Status: DISCONTINUED | OUTPATIENT
Start: 2022-11-14 | End: 2022-11-29

## 2022-11-14 RX ORDER — ATORVASTATIN CALCIUM 80 MG/1
40 TABLET, FILM COATED ORAL AT BEDTIME
Refills: 0 | Status: DISCONTINUED | OUTPATIENT
Start: 2022-11-14 | End: 2022-12-08

## 2022-11-14 RX ORDER — ACETAMINOPHEN 500 MG
650 TABLET ORAL EVERY 6 HOURS
Refills: 0 | Status: DISCONTINUED | OUTPATIENT
Start: 2022-11-14 | End: 2022-12-08

## 2022-11-14 RX ORDER — INSULIN LISPRO 100/ML
10 VIAL (ML) SUBCUTANEOUS
Refills: 0 | Status: DISCONTINUED | OUTPATIENT
Start: 2022-11-14 | End: 2022-11-17

## 2022-11-14 RX ORDER — NIFEDIPINE 30 MG
30 TABLET, EXTENDED RELEASE 24 HR ORAL DAILY
Refills: 0 | Status: DISCONTINUED | OUTPATIENT
Start: 2022-11-14 | End: 2022-12-08

## 2022-11-14 RX ORDER — SENNA PLUS 8.6 MG/1
2 TABLET ORAL AT BEDTIME
Refills: 0 | Status: DISCONTINUED | OUTPATIENT
Start: 2022-11-14 | End: 2022-12-08

## 2022-11-14 RX ORDER — DEXTROSE 50 % IN WATER 50 %
15 SYRINGE (ML) INTRAVENOUS ONCE
Refills: 0 | Status: DISCONTINUED | OUTPATIENT
Start: 2022-11-14 | End: 2022-11-19

## 2022-11-14 RX ORDER — CLOPIDOGREL BISULFATE 75 MG/1
75 TABLET, FILM COATED ORAL DAILY
Refills: 0 | Status: DISCONTINUED | OUTPATIENT
Start: 2022-11-14 | End: 2022-12-08

## 2022-11-14 RX ORDER — DEXTROSE 50 % IN WATER 50 %
12.5 SYRINGE (ML) INTRAVENOUS ONCE
Refills: 0 | Status: DISCONTINUED | OUTPATIENT
Start: 2022-11-14 | End: 2022-11-19

## 2022-11-14 RX ORDER — GLUCAGON INJECTION, SOLUTION 0.5 MG/.1ML
1 INJECTION, SOLUTION SUBCUTANEOUS ONCE
Refills: 0 | Status: DISCONTINUED | OUTPATIENT
Start: 2022-11-14 | End: 2022-11-19

## 2022-11-14 RX ORDER — ASPIRIN/CALCIUM CARB/MAGNESIUM 324 MG
81 TABLET ORAL DAILY
Refills: 0 | Status: DISCONTINUED | OUTPATIENT
Start: 2022-11-14 | End: 2022-12-08

## 2022-11-14 RX ADMIN — Medication 81 MILLIGRAM(S): at 13:27

## 2022-11-14 RX ADMIN — INSULIN GLARGINE 30 UNIT(S): 100 INJECTION, SOLUTION SUBCUTANEOUS at 22:20

## 2022-11-14 RX ADMIN — SEVELAMER CARBONATE 800 MILLIGRAM(S): 2400 POWDER, FOR SUSPENSION ORAL at 11:55

## 2022-11-14 RX ADMIN — SENNA PLUS 2 TABLET(S): 8.6 TABLET ORAL at 22:17

## 2022-11-14 RX ADMIN — Medication 60 MICROGRAM(S): at 16:30

## 2022-11-14 RX ADMIN — Medication 10 UNIT(S): at 11:43

## 2022-11-14 RX ADMIN — TAMSULOSIN HYDROCHLORIDE 0.4 MILLIGRAM(S): 0.4 CAPSULE ORAL at 22:17

## 2022-11-14 RX ADMIN — CLOPIDOGREL BISULFATE 75 MILLIGRAM(S): 75 TABLET, FILM COATED ORAL at 13:17

## 2022-11-14 RX ADMIN — ATORVASTATIN CALCIUM 40 MILLIGRAM(S): 80 TABLET, FILM COATED ORAL at 22:17

## 2022-11-14 RX ADMIN — HEPARIN SODIUM 5000 UNIT(S): 5000 INJECTION INTRAVENOUS; SUBCUTANEOUS at 22:21

## 2022-11-14 RX ADMIN — SEVELAMER CARBONATE 800 MILLIGRAM(S): 2400 POWDER, FOR SUSPENSION ORAL at 18:25

## 2022-11-14 RX ADMIN — HEPARIN SODIUM 5000 UNIT(S): 5000 INJECTION INTRAVENOUS; SUBCUTANEOUS at 13:26

## 2022-11-14 RX ADMIN — CEFEPIME 100 MILLIGRAM(S): 1 INJECTION, POWDER, FOR SOLUTION INTRAMUSCULAR; INTRAVENOUS at 18:25

## 2022-11-14 NOTE — H&P ADULT - ASSESSMENT
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 and cefepime post dialysis s.p wound Vac, chronic anemia, HFmrEF, presented to the hospital for chest pain         DVT ppx Heparin  GI ppx famotidine  Renal Consistent carb diet  Full Code Render Post-Care Instructions In Note?: no Notification Instructions: Patient will be notified of biopsy results. However, patient instructed to call the office if not contacted within 2 weeks. Depth Of Biopsy: dermis Curettage Text: The wound bed was treated with curettage after the biopsy was performed. Anesthesia Volume In Cc: 0.5 Additional Anesthesia Volume In Cc (Will Not Render If 0): 0 Electrodesiccation Text: The wound bed was treated with electrodesiccation after the biopsy was performed. Anesthesia Type: 1% lidocaine with epinephrine and a 1:10 solution of 8.4% sodium bicarbonate Silver Nitrate Text: The wound bed was treated with silver nitrate after the biopsy was performed. Dressing: bandage Wound Care: Polysporin ointment 63 yo male, h/o type 2 DM on insulin, CAD s.p stent 2008, s/p CABG 2012 (Dr Smith), PAD s/p angioplasty and stent b/l LE, BPH, ESRD on HD through left AVF (MWF follows with Dr MARY Paula), DFU sp left toe amputation, left calcaneal OM on daptomycin and cefepime post dialysis s.p wound Vac, chronic anemia, HFmrEF, presented to the hospital for chest pain     # Chest pain r/o ACS  # CAD s.p stent 2008 sp CABG 2012  # Elevated troponin    - pain worsens with coughing, non radiating, pressure like, occurring at rest; unlikely unstable angina or NSTEMI   - pain improved with Tylenol and Nitroglycerin; no active pain during encounter   - Trop 0.7 higher than previous levels; might be secondary to ESRD  - pro BNP 36207  - CXR no cardiopulmonary disease  - EKG sinus tachycardia ; no acute ischemic changes  - Trend CE; EKG  - tele monitoring   - cardiology evaluation Dr Kaminski     # HFmrEF  # Hypertension   # dyslipidemia    - EF 45% 02/2022  - fluid restriction; monitor intake   - avoid volume overload  - keep k > 4 and Mg > 2  - c/w Nifedipine 30 mg daily  - c/w Lasix 40 mg daily   - LDL 35 Cholesterol 81 10/2022    # anemia of chronic disease    - no evidence of acute bleed or HD compromise  - hb on admission 6.6 baseline 7.6  - recent iron studies low TIBC normal iron and saturation  - check folate, b12   - s/p 1 unit PRBC in ED  - keep hb > 7; transfuse as needed  - EPO indicated OP if no Contraindication  - colonoscopy has to be up to date     # ESRD on HD     - MWF through left AVF good thrill on PE  - follows with Dr MARY Paula   - Serum Bicarb 23 > 19 no need for sodium bicarb  - patient still has urine output   - c/w sevelamer 800 mg q 8  - c/w lasix 40 mg daily  - renally dosed medication   - monitor electrolytes, Ph  - patient close to euvolemia     # Left calcaneal OM     - s/p removal of FB 9/16  - Wound Cx 9/16 - Enterobacter cloacae  - wound CX 10/16 VRE Faecim, Enterobacter cloacae complex, Proteus vulgaris, Morganella morganii   - s/p debridement 10/21 Wound Cx Proteus mirabilis, VRE faecium, Pseudomonas putida  - s/p debridement 10/24 Crumble L alcaenous   - s/p debridement 11/3 with wound vac in place  - on discharge, switch to cefepime 2g post HD and daptomycin 10 mg/kg post HD x first two sessions of the week and 12 mg/kg post HD on third session; currently 900 mg post HD   - check CK level   - plan for 4 weeks from last debridement (11/3-11/30)  - repeat ESR/CRP     # PAD    - s/p angiogram LLE with peroneal artery angioplasty and artherectomy 10/27  - c/w ASA 81 mg daily  - c/w Plavix 75 mg daily  - c/w atorvastatin 40 mg daily  - physical therapy     # Type 2 DM    - A1c 6.8 10/2022  - takes Levemir 40 Units daily ; Trulicity 1.5 mg weekly  - Will start Lantus 30 units daily; lispro 10 with meals; corrective scale 1:50  - avoid hypoglycemia  - keep -180  - consistent carb diet     # BPH    - c/w Tamsulosin 0.4 mg daily    DVT ppx Heparin  GI ppx famotidine  Renal Consistent carb diet  Full Code Biopsy Type: H and E Consent: Written consent was obtained and risks were reviewed including but not limited to scarring, infection, bleeding, scabbing, incomplete removal, nerve damage and allergy to anesthesia. Type Of Destruction Used: Curettage Hemostasis: Ferric chloride Biopsy Method: Dermablade Detail Level: Detailed Electrodesiccation And Curettage Text: The wound bed was treated with electrodesiccation and curettage after the biopsy was performed. Cryotherapy Text: The wound bed was treated with cryotherapy after the biopsy was performed. Billing Type: Third-Party Bill Was A Bandage Applied: Yes Post-Care Instructions: I reviewed with the patient in detail post-care instructions. Patient is to keep the biopsy site dry overnight, and then apply bacitracin twice daily until healed. Patient may apply hydrogen peroxide soaks to remove any crusting.

## 2022-11-14 NOTE — H&P ADULT - NSHPPHYSICALEXAM_GEN_ALL_CORE
PHYSICAL EXAM:      Constitutional: NAD     Respiratory: clear to auscultation b/l     Cardiovascular: regular rate and rhythm no audible murmur     Gastrointestinal: soft non tender, positive bowel sounds     Extremities: left calcaneal OM chronic vascular changes, wound wrapped presence of wound vac LLE, no pitting edema     Neurological: A&O x3 grossly non focal

## 2022-11-14 NOTE — ED ADULT NURSE REASSESSMENT NOTE - NS ED NURSE REASSESS COMMENT FT1
Received report from prior RN. Pt by RN station, A&O. Fall precautions maintained, BA in place. No s/s of distress noted. Comfort measures offered, will f/u.

## 2022-11-14 NOTE — ED ADULT NURSE REASSESSMENT NOTE - NSIMPLEMENTINTERV_GEN_ALL_ED
Implemented All Fall with Harm Risk Interventions:  [place] to call system. Call bell, personal items and telephone within reach. Instruct patient to call for assistance. Room bathroom lighting operational. Non-slip footwear when patient is off stretcher. Physically safe environment: no spills, clutter or unnecessary equipment. Stretcher in lowest position, wheels locked, appropriate side rails in place. Provide visual cue, wrist band, yellow gown, etc. Monitor gait and stability. Monitor for mental status changes and reorient to person, place, and time. Review medications for side effects contributing to fall risk. Reinforce activity limits and safety measures with patient and family. Provide visual clues: red socks.

## 2022-11-14 NOTE — H&P ADULT - NSICDXPASTMEDICALHX_GEN_ALL_CORE_FT
PAST MEDICAL HISTORY:  BPH (benign prostatic hyperplasia)     CAD (coronary artery disease) 1 stent 2008    Chronic anemia     Chronic kidney disease (CKD) Stage IV    Diabetes mellitus     HLD (hyperlipidemia)     [place]hard [place]     Hypertension     Myocardial infarction 2012    OA (osteoarthritis)     PAD (peripheral artery disease) S/p bypass left leg    Pain in left knee s/p fall    S/P CABG (coronary artery bypass graft) x4    Stented coronary artery in 2008    Transient ischemic attack (TIA) 2017; 2008

## 2022-11-14 NOTE — H&P ADULT - HISTORY OF PRESENT ILLNESS
63 yo male, h/o type 2 DM on insulin, CAD s.p stent 2008, s/p CABG 2012 (Dr Smith), PAD s/p angioplasty and stent b/l LE, BPH, ESRD on HD through left AVF (MWF follows with Dr MARY Paula), DFU sp left toe amputation, left calcaneal OM on daptomycin and cefepime post dialysis s.p wound Vac, chronic anemia, HFmrEF, presented to the hospital for chest pain   Pain is mid sternal, pressure like, discomfort rather than pain, occurring at rest, non radiating, exacerbated by cough, not similar to previous episodes of chest pain, not associated with tremor , diaphoresis, numbness, lightheadedness; Pain has improved with tylenol initially and then resolved with nitroglycerin SL   Patient denies fever, chills, shortness of breath, sputum, abdominal pain; ROS otherwise negative

## 2022-11-14 NOTE — CHART NOTE - NSCHARTNOTEFT_GEN_A_CORE
Informed by nursing that patient was experiencing chest pain. Ordered repeat ECG.    Spoke to patient at bedside. Reports the pain subsided; states it was the same type of mid sternal discomfort he had experienced prior to admission. Denies any radiation, nausea.     ECG showed, T wave inversion in 2, 3, avf, and v4-6. Patients trops uptrending; from 0.7 to 0.75.    Patient has private cardiologist who was consulted. Reached out to cardio fellow while pending consult by private attending due to acute ecg change and trop elevation.    Per cardio fellow, ecg appears to be NSTEMI; recommended to wait for private cardiologist's recommendations.

## 2022-11-14 NOTE — H&P ADULT - NSHPLABSRESULTS_GEN_ALL_CORE
6.6    8.50  )-----------( 306      ( 13 Nov 2022 23:01 )             19.6       11-13    132<L>  |  90<L>  |  60<H>  ----------------------------<  159<H>  5.0   |  23  |  6.0<HH>    Ca    8.3<L>      13 Nov 2022 23:01  Mg     2.3     11-13    TPro  7.5  /  Alb  3.2<L>  /  TBili  0.4  /  DBili  x   /  AST  9   /  ALT  <5  /  AlkPhos  175<H>  11-13    < from: Xray Chest 1 View-PORTABLE IMMEDIATE (11.13.22 @ 21:56) >      No radiographic evidence of acute cardiopulmonary disease.      < from: 12 Lead ECG (11.13.22 @ 20:24) >    Diagnosis Line Sinus tachycardia  Possible Anterior infarct , age undetermined  Abnormal ECG    Troponin T, Serum: 0.70

## 2022-11-14 NOTE — CONSULT NOTE ADULT - ATTENDING COMMENTS
ESRD on HD MWF - HD today, 3hrs, Opti 160, 2K+, 3L UF as tolerated  Acute on chronic anemia - likely did not have RAMSEY resumed outpatient, s/p RBCs, start RAMSEY wkly with HD, will need high dose with infection  LE OM - cont abx per ID recs

## 2022-11-14 NOTE — H&P ADULT - ATTENDING COMMENTS
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 and cefepime post dialysis s.p wound Vac, chronic anemia, HFmrEF, presented to the hospital for chest pain     #Chest pain  #CAD s/p PCI/CABG  #PAD s/p angioplasty  #HTN/HLD  #Chronic HFmrEF  Troponins 0.70 in setting of ESRD. Though was 0.25 back in 09/2022  No more chest pain overnight  - Telemetry monitoring  - Cont trending cardiac enzymes  - f/u echocardiogram  - f/u cardiology, Dr. Robertson  - c/w ASA 81 mg daily  - c/w Plavix 75 mg daily  - c/w atorvastatin 40 mg daily  - c/w Nifedipine 30 mg daily    # ESRD on HD   MWF  - HD per nephrology    #Recent hx Left calcaneal OM   - s/p removal of FB 9/16  - Wound Cx 9/16 - Enterobacter cloacae  - wound CX 10/16 VRE Faecim, Enterobacter cloacae complex, Proteus vulgaris, Morganella morganii   - s/p debridement 10/21 Wound Cx Proteus mirabilis, VRE faecium, Pseudomonas putida  - s/p debridement 10/24 Crumble L alcaenous   - s/p debridement 11/3 with wound vac in place  - Cefepime 2g post HD through 11/30; s/p 3 session of daptomycin post-HD  - On last admission, ID plan for 4 weeks from last debridement (11/3-11/30)  - repeat ESR/CRP     # PAD  - s/p angiogram LLE with peroneal artery angioplasty and artherectomy 10/27  - Cont meds as above    # Type 2 DM  Continue insulin regimen    # BPH  - c/w Tamsulosin 0.4 mg daily    DVT PPX, heparin    #Progress Note Handoff  Pending (specify): Cardiac enzymes, cardiology eval  Family discussion: d/w pt regarding eval for chest pain  Disposition: Home 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 and cefepime post dialysis s.p wound Vac, chronic anemia, HFmrEF, presented to the hospital for chest pain     #Chest pain  #CAD s/p PCI/CABG  #PAD s/p angioplasty  #HTN/HLD  #Chronic HFmrEF  Troponins 0.70 in setting of ESRD. Though was 0.25 back in 09/2022  No more chest pain overnight  - Telemetry monitoring  - Cont trending cardiac enzymes  - f/u echocardiogram  - f/u cardiology, Dr. Robertson  - c/w ASA 81 mg daily  - c/w Plavix 75 mg daily  - c/w atorvastatin 40 mg daily  - c/w Nifedipine 30 mg daily    # anemia of chronic disease  no evidence of acute bleed or HD compromise  hb on admission 6.6 baseline 7.6. s/p 1U PRBC, hg 8.2 today  - Monitor CBC, transfuse to keep >8  - colonoscopy has to be up to date     # ESRD on HD   MWF  - HD per nephrology    #Recent hx Left calcaneal OM   - s/p removal of FB 9/16  - Wound Cx 9/16 - Enterobacter cloacae  - wound CX 10/16 VRE Faecim, Enterobacter cloacae complex, Proteus vulgaris, Morganella morganii   - s/p debridement 10/21 Wound Cx Proteus mirabilis, VRE faecium, Pseudomonas putida  - s/p debridement 10/24 Crumble L alcaenous   - s/p debridement 11/3 with wound vac in place  - Cefepime 2g post HD through 11/30; s/p 3 session of daptomycin post-HD  - On last admission, ID plan for 4 weeks from last debridement (11/3-11/30)  - repeat ESR/CRP     # PAD  - s/p angiogram LLE with peroneal artery angioplasty and artherectomy 10/27  - Cont meds as above    # Type 2 DM  Continue insulin regimen    # BPH  - c/w Tamsulosin 0.4 mg daily    DVT PPX, heparin    #Progress Note Handoff  Pending (specify): Cardiac enzymes, cardiology eval  Family discussion: d/w pt regarding eval for chest pain  Disposition: Home

## 2022-11-15 DIAGNOSIS — E11.649 TYPE 2 DIABETES MELLITUS WITH HYPOGLYCEMIA WITHOUT COMA: ICD-10-CM

## 2022-11-15 DIAGNOSIS — Z79.02 LONG TERM (CURRENT) USE OF ANTITHROMBOTICS/ANTIPLATELETS: ICD-10-CM

## 2022-11-15 DIAGNOSIS — Z16.21 RESISTANCE TO VANCOMYCIN: ICD-10-CM

## 2022-11-15 DIAGNOSIS — Z95.5 PRESENCE OF CORONARY ANGIOPLASTY IMPLANT AND GRAFT: ICD-10-CM

## 2022-11-15 DIAGNOSIS — I70.262 ATHEROSCLEROSIS OF NATIVE ARTERIES OF EXTREMITIES WITH GANGRENE, LEFT LEG: ICD-10-CM

## 2022-11-15 DIAGNOSIS — M84.475A PATHOLOGICAL FRACTURE, LEFT FOOT, INITIAL ENCOUNTER FOR FRACTURE: ICD-10-CM

## 2022-11-15 DIAGNOSIS — N39.0 URINARY TRACT INFECTION, SITE NOT SPECIFIED: ICD-10-CM

## 2022-11-15 DIAGNOSIS — K59.00 CONSTIPATION, UNSPECIFIED: ICD-10-CM

## 2022-11-15 DIAGNOSIS — I25.2 OLD MYOCARDIAL INFARCTION: ICD-10-CM

## 2022-11-15 DIAGNOSIS — E78.5 HYPERLIPIDEMIA, UNSPECIFIED: ICD-10-CM

## 2022-11-15 DIAGNOSIS — M19.90 UNSPECIFIED OSTEOARTHRITIS, UNSPECIFIED SITE: ICD-10-CM

## 2022-11-15 DIAGNOSIS — M86.172 OTHER ACUTE OSTEOMYELITIS, LEFT ANKLE AND FOOT: ICD-10-CM

## 2022-11-15 DIAGNOSIS — L03.116 CELLULITIS OF LEFT LOWER LIMB: ICD-10-CM

## 2022-11-15 DIAGNOSIS — Z99.2 DEPENDENCE ON RENAL DIALYSIS: ICD-10-CM

## 2022-11-15 DIAGNOSIS — E11.621 TYPE 2 DIABETES MELLITUS WITH FOOT ULCER: ICD-10-CM

## 2022-11-15 DIAGNOSIS — E11.22 TYPE 2 DIABETES MELLITUS WITH DIABETIC CHRONIC KIDNEY DISEASE: ICD-10-CM

## 2022-11-15 DIAGNOSIS — I12.0 HYPERTENSIVE CHRONIC KIDNEY DISEASE WITH STAGE 5 CHRONIC KIDNEY DISEASE OR END STAGE RENAL DISEASE: ICD-10-CM

## 2022-11-15 DIAGNOSIS — L97.526 NON-PRESSURE CHRONIC ULCER OF OTHER PART OF LEFT FOOT WITH BONE INVOLVEMENT WITHOUT EVIDENCE OF NECROSIS: ICD-10-CM

## 2022-11-15 DIAGNOSIS — E11.69 TYPE 2 DIABETES MELLITUS WITH OTHER SPECIFIED COMPLICATION: ICD-10-CM

## 2022-11-15 DIAGNOSIS — E87.1 HYPO-OSMOLALITY AND HYPONATREMIA: ICD-10-CM

## 2022-11-15 DIAGNOSIS — Z89.422 ACQUIRED ABSENCE OF OTHER LEFT TOE(S): ICD-10-CM

## 2022-11-15 DIAGNOSIS — D63.1 ANEMIA IN CHRONIC KIDNEY DISEASE: ICD-10-CM

## 2022-11-15 DIAGNOSIS — N40.0 BENIGN PROSTATIC HYPERPLASIA WITHOUT LOWER URINARY TRACT SYMPTOMS: ICD-10-CM

## 2022-11-15 DIAGNOSIS — Z79.82 LONG TERM (CURRENT) USE OF ASPIRIN: ICD-10-CM

## 2022-11-15 DIAGNOSIS — M00.9 PYOGENIC ARTHRITIS, UNSPECIFIED: ICD-10-CM

## 2022-11-15 DIAGNOSIS — A41.9 SEPSIS, UNSPECIFIED ORGANISM: ICD-10-CM

## 2022-11-15 DIAGNOSIS — Z16.12 EXTENDED SPECTRUM BETA LACTAMASE (ESBL) RESISTANCE: ICD-10-CM

## 2022-11-15 DIAGNOSIS — E11.52 TYPE 2 DIABETES MELLITUS WITH DIABETIC PERIPHERAL ANGIOPATHY WITH GANGRENE: ICD-10-CM

## 2022-11-15 DIAGNOSIS — N18.6 END STAGE RENAL DISEASE: ICD-10-CM

## 2022-11-15 DIAGNOSIS — I25.10 ATHEROSCLEROTIC HEART DISEASE OF NATIVE CORONARY ARTERY WITHOUT ANGINA PECTORIS: ICD-10-CM

## 2022-11-15 LAB
ANION GAP SERPL CALC-SCNC: 13 MMOL/L — SIGNIFICANT CHANGE UP (ref 7–14)
BASOPHILS # BLD AUTO: 0.1 K/UL — SIGNIFICANT CHANGE UP (ref 0–0.2)
BASOPHILS NFR BLD AUTO: 1.4 % — HIGH (ref 0–1)
BUN SERPL-MCNC: 43 MG/DL — HIGH (ref 10–20)
CALCIUM SERPL-MCNC: 8.1 MG/DL — LOW (ref 8.4–10.4)
CHLORIDE SERPL-SCNC: 94 MMOL/L — LOW (ref 98–110)
CK SERPL-CCNC: 25 U/L — SIGNIFICANT CHANGE UP (ref 0–225)
CO2 SERPL-SCNC: 27 MMOL/L — SIGNIFICANT CHANGE UP (ref 17–32)
CREAT SERPL-MCNC: 4.9 MG/DL — CRITICAL HIGH (ref 0.7–1.5)
EGFR: 12 ML/MIN/1.73M2 — LOW
EOSINOPHIL # BLD AUTO: 0.25 K/UL — SIGNIFICANT CHANGE UP (ref 0–0.7)
EOSINOPHIL NFR BLD AUTO: 3.4 % — SIGNIFICANT CHANGE UP (ref 0–8)
FOLATE SERPL-MCNC: 4.1 NG/ML — LOW
GLUCOSE BLDC GLUCOMTR-MCNC: 140 MG/DL — HIGH (ref 70–99)
GLUCOSE BLDC GLUCOMTR-MCNC: 164 MG/DL — HIGH (ref 70–99)
GLUCOSE BLDC GLUCOMTR-MCNC: 167 MG/DL — HIGH (ref 70–99)
GLUCOSE BLDC GLUCOMTR-MCNC: 83 MG/DL — SIGNIFICANT CHANGE UP (ref 70–99)
GLUCOSE SERPL-MCNC: 77 MG/DL — SIGNIFICANT CHANGE UP (ref 70–99)
HCT VFR BLD CALC: 23.2 % — LOW (ref 42–52)
HGB BLD-MCNC: 7.8 G/DL — LOW (ref 14–18)
IMM GRANULOCYTES NFR BLD AUTO: 0.4 % — HIGH (ref 0.1–0.3)
LYMPHOCYTES # BLD AUTO: 0.82 K/UL — LOW (ref 1.2–3.4)
LYMPHOCYTES # BLD AUTO: 11.1 % — LOW (ref 20.5–51.1)
MAGNESIUM SERPL-MCNC: 2 MG/DL — SIGNIFICANT CHANGE UP (ref 1.8–2.4)
MCHC RBC-ENTMCNC: 31 PG — SIGNIFICANT CHANGE UP (ref 27–31)
MCHC RBC-ENTMCNC: 33.6 G/DL — SIGNIFICANT CHANGE UP (ref 32–37)
MCV RBC AUTO: 92.1 FL — SIGNIFICANT CHANGE UP (ref 80–94)
MONOCYTES # BLD AUTO: 0.59 K/UL — SIGNIFICANT CHANGE UP (ref 0.1–0.6)
MONOCYTES NFR BLD AUTO: 8 % — SIGNIFICANT CHANGE UP (ref 1.7–9.3)
NEUTROPHILS # BLD AUTO: 5.58 K/UL — SIGNIFICANT CHANGE UP (ref 1.4–6.5)
NEUTROPHILS NFR BLD AUTO: 75.7 % — HIGH (ref 42.2–75.2)
NRBC # BLD: 0 /100 WBCS — SIGNIFICANT CHANGE UP (ref 0–0)
PHOSPHATE SERPL-MCNC: 3.6 MG/DL — SIGNIFICANT CHANGE UP (ref 2.1–4.9)
PLATELET # BLD AUTO: 271 K/UL — SIGNIFICANT CHANGE UP (ref 130–400)
POTASSIUM SERPL-MCNC: 3.7 MMOL/L — SIGNIFICANT CHANGE UP (ref 3.5–5)
POTASSIUM SERPL-SCNC: 3.7 MMOL/L — SIGNIFICANT CHANGE UP (ref 3.5–5)
RBC # BLD: 2.52 M/UL — LOW (ref 4.7–6.1)
RBC # FLD: 14.6 % — HIGH (ref 11.5–14.5)
SODIUM SERPL-SCNC: 134 MMOL/L — LOW (ref 135–146)
TROPONIN T SERPL-MCNC: 0.79 NG/ML — CRITICAL HIGH
VIT B12 SERPL-MCNC: 326 PG/ML — SIGNIFICANT CHANGE UP (ref 232–1245)
WBC # BLD: 7.37 K/UL — SIGNIFICANT CHANGE UP (ref 4.8–10.8)
WBC # FLD AUTO: 7.37 K/UL — SIGNIFICANT CHANGE UP (ref 4.8–10.8)

## 2022-11-15 PROCEDURE — 99223 1ST HOSP IP/OBS HIGH 75: CPT | Mod: 57,24

## 2022-11-15 PROCEDURE — 99233 SBSQ HOSP IP/OBS HIGH 50: CPT

## 2022-11-15 PROCEDURE — 93010 ELECTROCARDIOGRAM REPORT: CPT

## 2022-11-15 RX ORDER — INFLUENZA VIRUS VACCINE 15; 15; 15; 15 UG/.5ML; UG/.5ML; UG/.5ML; UG/.5ML
0.5 SUSPENSION INTRAMUSCULAR ONCE
Refills: 0 | Status: DISCONTINUED | OUTPATIENT
Start: 2022-11-15 | End: 2022-12-08

## 2022-11-15 RX ADMIN — Medication 2: at 17:54

## 2022-11-15 RX ADMIN — HEPARIN SODIUM 5000 UNIT(S): 5000 INJECTION INTRAVENOUS; SUBCUTANEOUS at 22:57

## 2022-11-15 RX ADMIN — Medication 650 MILLIGRAM(S): at 00:21

## 2022-11-15 RX ADMIN — HEPARIN SODIUM 5000 UNIT(S): 5000 INJECTION INTRAVENOUS; SUBCUTANEOUS at 13:34

## 2022-11-15 RX ADMIN — Medication 650 MILLIGRAM(S): at 00:51

## 2022-11-15 RX ADMIN — ATORVASTATIN CALCIUM 40 MILLIGRAM(S): 80 TABLET, FILM COATED ORAL at 22:57

## 2022-11-15 RX ADMIN — SENNA PLUS 2 TABLET(S): 8.6 TABLET ORAL at 22:56

## 2022-11-15 RX ADMIN — CLOPIDOGREL BISULFATE 75 MILLIGRAM(S): 75 TABLET, FILM COATED ORAL at 13:33

## 2022-11-15 RX ADMIN — Medication 81 MILLIGRAM(S): at 13:33

## 2022-11-15 RX ADMIN — Medication 40 MILLIGRAM(S): at 05:41

## 2022-11-15 RX ADMIN — HEPARIN SODIUM 5000 UNIT(S): 5000 INJECTION INTRAVENOUS; SUBCUTANEOUS at 05:51

## 2022-11-15 RX ADMIN — INSULIN GLARGINE 30 UNIT(S): 100 INJECTION, SOLUTION SUBCUTANEOUS at 22:57

## 2022-11-15 RX ADMIN — SEVELAMER CARBONATE 800 MILLIGRAM(S): 2400 POWDER, FOR SUSPENSION ORAL at 17:54

## 2022-11-15 RX ADMIN — TAMSULOSIN HYDROCHLORIDE 0.4 MILLIGRAM(S): 0.4 CAPSULE ORAL at 22:56

## 2022-11-15 RX ADMIN — Medication 30 MILLIGRAM(S): at 05:41

## 2022-11-15 RX ADMIN — SEVELAMER CARBONATE 800 MILLIGRAM(S): 2400 POWDER, FOR SUSPENSION ORAL at 13:34

## 2022-11-15 RX ADMIN — FAMOTIDINE 20 MILLIGRAM(S): 10 INJECTION INTRAVENOUS at 13:33

## 2022-11-15 RX ADMIN — SEVELAMER CARBONATE 800 MILLIGRAM(S): 2400 POWDER, FOR SUSPENSION ORAL at 10:36

## 2022-11-15 NOTE — PHYSICAL THERAPY INITIAL EVALUATION ADULT - ADDITIONAL COMMENTS
Pt lives alone in apartment with 1 FOS to enter. Pt was admitted from SNF, ambulated with RW and required some assistance with ADLs.

## 2022-11-15 NOTE — PHYSICAL THERAPY INITIAL EVALUATION ADULT - GENERAL OBSERVATIONS, REHAB EVAL
[id number] Pt received and left semifowlers in bed, NAD, pt agreeable to PT session. +tele +L heel dressing, confirmed with podiatry ext [phone] pt remains TTWB on LLE, pt cleared for PT despite elevated troponins per Dr. Vigil

## 2022-11-15 NOTE — PROGRESS NOTE ADULT - SUBJECTIVE AND OBJECTIVE BOX
Nephrology progress note    Patient was seen and examined, events over the last 24 h noted .  HD yesterday    Allergies:  No Known Allergies    Hospital Medications:   MEDICATIONS  (STANDING):  aspirin enteric coated 81 milliGRAM(s) Oral daily  atorvastatin 40 milliGRAM(s) Oral at bedtime  cefepime   IVPB 2000 milliGRAM(s) IV Intermittent <User Schedule>  clopidogrel Tablet 75 milliGRAM(s) Oral daily  darbepoetin Injectable Syringe 60 MICROGram(s) IV Push <User Schedule>  dextrose 5%. 1000 milliLiter(s) (100 mL/Hr) IV Continuous <Continuous>  dextrose 5%. 1000 milliLiter(s) (50 mL/Hr) IV Continuous <Continuous>  dextrose 50% Injectable 25 Gram(s) IV Push once  dextrose 50% Injectable 12.5 Gram(s) IV Push once  dextrose 50% Injectable 25 Gram(s) IV Push once  famotidine    Tablet 20 milliGRAM(s) Oral daily  furosemide    Tablet 40 milliGRAM(s) Oral daily  glucagon  Injectable 1 milliGRAM(s) IntraMuscular once  heparin   Injectable 5000 Unit(s) SubCutaneous every 8 hours  influenza   Vaccine 0.5 milliLiter(s) IntraMuscular once  insulin glargine Injectable (LANTUS) 30 Unit(s) SubCutaneous at bedtime  insulin lispro (ADMELOG) corrective regimen sliding scale   SubCutaneous three times a day before meals  insulin lispro Injectable (ADMELOG) 10 Unit(s) SubCutaneous three times a day before meals  NIFEdipine XL 30 milliGRAM(s) Oral daily  senna 2 Tablet(s) Oral at bedtime  sevelamer carbonate 800 milliGRAM(s) Oral three times a day with meals  tamsulosin 0.4 milliGRAM(s) Oral at bedtime        VITALS:  T(F): 97 (11-15-22 @ 16:20), Max: 97.7 (11-15-22 @ 00:13)  HR: 90 (11-15-22 @ 16:20)  BP: 119/60 (11-15-22 @ 16:20)  RR: 18 (11-15-22 @ 16:20)  SpO2: 98% (11-15-22 @ 16:20)  Wt(kg): --    11-14 @ 07:01  -  11-15 @ 07:00  --------------------------------------------------------  IN: 60 mL / OUT: 3350 mL / NET: -3290 mL          PHYSICAL EXAM:  Constitutional: NAD  HEENT: anicteric sclera, oropharynx clear, MMM  Neck: No JVD  Respiratory: CTAB, no wheezes, rales or rhonchi  Cardiovascular: S1, S2, RRR  Gastrointestinal: BS+, soft, NT/ND  Extremities: No cyanosis or clubbing. No peripheral edema  :  No schneider.   Skin: No rashes    LABS:  11-15    134<L>  |  94<L>  |  43<H>  ----------------------------<  77  3.7   |  27  |  4.9<HH>    Ca    8.1<L>      15 Nov 2022 06:57  Phos  3.6     11-15  Mg     2.0     11-15    TPro  7.5  /  Alb  3.2<L>  /  TBili  0.4  /  DBili      /  AST  9   /  ALT  <5  /  AlkPhos  175<H>  11-13                          7.8    7.37  )-----------( 271      ( 15 Nov 2022 06:57 )             23.2       Urine Studies:      RADIOLOGY & ADDITIONAL STUDIES:

## 2022-11-15 NOTE — PROGRESS NOTE ADULT - SUBJECTIVE AND OBJECTIVE BOX
T H I S   I S    N O  T   A    F I N A L I Z E D   N O T E SCHWABACHER, LAWRENCE  62y, Male  Allergy: No Known Allergies    Hospital Day: 1d    Patient seen and examined earlier today. Chest pain resolved. As per Cardio ST depressions on prior EKG 2/2 lead reversal. Repeat EKG normalized.     PMH/PSH:  PAST MEDICAL & SURGICAL HISTORY:  CAD (coronary artery disease)  1 stent 2008      S/P CABG (coronary artery bypass graft)  x4      Diabetes mellitus      Transient ischemic attack (TIA)  2017; 2008      Chronic kidney disease (CKD)  Stage IV      Hypertension      Stented coronary artery  in 2008      Myocardial infarction  2012      PAD (peripheral artery disease)  S/p bypass left leg      HLD (hyperlipidemia)      BPH (benign prostatic hyperplasia)      Pain in left knee  s/p fall      OA (osteoarthritis)      [place] (hard of hearing)      Chronic anemia      S/P CABG (coronary artery bypass graft)  2012      H/O arterial bypass of lower limb  Left Lower Extremity (2016)      History of surgery  Left CEA (2017)  Left Pinkie toe Amputation (2014)  CABG x 4 (2012)  Card cath - stent (2008)        AV fistula  2019  LEFT AV FISTULA          LAST 24-Hr EVENTS:    VITALS:  T(F): 97 (11-15-22 @ 16:20), Max: 97.7 (11-15-22 @ 00:13)  HR: 90 (11-15-22 @ 16:20)  BP: 119/60 (11-15-22 @ 16:20) (119/60 - 162/89)  RR: 18 (11-15-22 @ 16:20)  SpO2: 98% (11-15-22 @ 16:20)        TESTS & MEASUREMENTS:  Weight (Kg): 86.2 (11-13-22 @ 20:16)  BMI (kg/m2): 28.1 (11-13)    11-14-22 @ 07:01  -  11-15-22 @ 07:00  --------------------------------------------------------  IN: 60 mL / OUT: 3350 mL / NET: -3290 mL                            7.8    7.37  )-----------( 271      ( 15 Nov 2022 06:57 )             23.2       11-15    134<L>  |  94<L>  |  43<H>  ----------------------------<  77  3.7   |  27  |  4.9<HH>    Ca    8.1<L>      15 Nov 2022 06:57  Phos  3.6     11-15  Mg     2.0     11-15    TPro  7.5  /  Alb  3.2<L>  /  TBili  0.4  /  DBili  x   /  AST  9   /  ALT  <5  /  AlkPhos  175<H>  11-13    LIVER FUNCTIONS - ( 13 Nov 2022 23:01 )  Alb: 3.2 g/dL / Pro: 7.5 g/dL / ALK PHOS: 175 U/L / ALT: <5 U/L / AST: 9 U/L / GGT: x           CARDIAC MARKERS ( 15 Nov 2022 06:57 )  x     / 0.79 ng/mL / 25 U/L / x     / x      CARDIAC MARKERS ( 14 Nov 2022 21:55 )  x     / 0.74 ng/mL / x     / x     / 2.3 ng/mL  CARDIAC MARKERS ( 14 Nov 2022 16:00 )  x     / 0.80 ng/mL / x     / x     / 2.8 ng/mL  CARDIAC MARKERS ( 14 Nov 2022 11:33 )  x     / 0.75 ng/mL / 35 U/L / x     / x      CARDIAC MARKERS ( 13 Nov 2022 23:01 )  x     / 0.70 ng/mL / x     / x     / x                    Serum Pro-Brain Natriuretic Peptide: 88099 pg/mL (11-13-22 @ 23:01)    COVID-19 PCR: NotDetec (11-13-22 @ 21:56)      RADIOLOGY, ECG, & ADDITIONAL TESTS:  12 Lead ECG:   Ventricular Rate 102 BPM    Atrial Rate 102 BPM    P-R Interval 178 ms    QRS Duration 92 ms    Q-T Interval 370 ms    QTC Calculation(Bazett) 482 ms    R Axis 126 degrees    T Axis -52 degrees    Diagnosis Line Sinus tachycardia  Possible Right ventricular hypertrophy  Lateral infarct , age undetermined  ST & T wave abnormality, consider inferior ischemia  Abnormal ECG    Confirmed by INGRIS JACOME MD ([id number]) on 11/15/2022 6:58:02 AM (11-14-22 @ 13:12)  12 Lead ECG:   Ventricular Rate 103 BPM    Atrial Rate 103 BPM    P-R Interval 182 ms    QRS Duration 94 ms    Q-T Interval 396 ms    QTC Calculation(Bazett) 518 ms    P Axis 35 degrees    R Axis 34 degrees    T Axis 100 degrees    Diagnosis Line Sinus tachycardia  Possible Anterior infarct , age undetermined  Abnormal ECG    Confirmed by Paige Jolly MD ([id number]) on 11/13/2022 10:41:57 PM (11-13-22 @ 20:24)      RECENT DIAGNOSTIC ORDERS:  12 Lead ECG (11-15-22 @ 09:05)  TTE Echo Complete w/o Contrast w/ Doppler:   Transport: Wheelchair  Monitor: w/o Monitor (11-15-22 @ 07:46)      MEDICATIONS:  MEDICATIONS  (STANDING):  aspirin enteric coated 81 milliGRAM(s) Oral daily  atorvastatin 40 milliGRAM(s) Oral at bedtime  cefepime   IVPB 2000 milliGRAM(s) IV Intermittent <User Schedule>  clopidogrel Tablet 75 milliGRAM(s) Oral daily  darbepoetin Injectable Syringe 60 MICROGram(s) IV Push <User Schedule>  dextrose 5%. 1000 milliLiter(s) (100 mL/Hr) IV Continuous <Continuous>  dextrose 5%. 1000 milliLiter(s) (50 mL/Hr) IV Continuous <Continuous>  dextrose 50% Injectable 25 Gram(s) IV Push once  dextrose 50% Injectable 12.5 Gram(s) IV Push once  dextrose 50% Injectable 25 Gram(s) IV Push once  famotidine    Tablet 20 milliGRAM(s) Oral daily  furosemide    Tablet 40 milliGRAM(s) Oral daily  glucagon  Injectable 1 milliGRAM(s) IntraMuscular once  heparin   Injectable 5000 Unit(s) SubCutaneous every 8 hours  influenza   Vaccine 0.5 milliLiter(s) IntraMuscular once  insulin glargine Injectable (LANTUS) 30 Unit(s) SubCutaneous at bedtime  insulin lispro (ADMELOG) corrective regimen sliding scale   SubCutaneous three times a day before meals  insulin lispro Injectable (ADMELOG) 10 Unit(s) SubCutaneous three times a day before meals  NIFEdipine XL 30 milliGRAM(s) Oral daily  senna 2 Tablet(s) Oral at bedtime  sevelamer carbonate 800 milliGRAM(s) Oral three times a day with meals  tamsulosin 0.4 milliGRAM(s) Oral at bedtime    MEDICATIONS  (PRN):  acetaminophen     Tablet .. 650 milliGRAM(s) Oral every 6 hours PRN Mild Pain (1 - 3)  dextrose Oral Gel 15 Gram(s) Oral once PRN Blood Glucose LESS THAN 70 milliGRAM(s)/deciliter      HOME MEDICATIONS:  aspirin 81 mg oral tablet (09-14)  cefepime 2 g intravenous injection (11-14)  DAPTOmycin 500 mg intravenous injection (11-14)  furosemide 40 mg oral tablet (11-14)  Levemir 100 units/mL subcutaneous solution (09-14)  NIFEdipine 30 mg oral tablet, extended release (11-07)  nitroglycerin 0.4 mg sublingual spray (11-14)  Plavix 75 mg oral tablet (09-14)  Senna 8.6 mg oral tablet (11-14)  sevelamer carbonate 800 mg oral tablet (09-20)  tamsulosin 0.4 mg oral capsule (11-07)  Trulicity Pen 1.5 mg/0.5 mL subcutaneous solution (09-14)  Tylenol 325 mg oral tablet (11-14)      PHYSICAL EXAM:  Constitutional: NAD, pleasant   HNENT: NCAT MMM   Respiratory: clear to auscultation b/l   Cardiovascular: regular rate and rhythm no audible murmur   Gastrointestinal: soft non tender, positive bowel sounds   Extremities: deep left plantar ulcer with exudate   Neurological: A&O x3 grossly non focal

## 2022-11-15 NOTE — PHYSICAL THERAPY INITIAL EVALUATION ADULT - PERTINENT HX OF CURRENT PROBLEM, REHAB EVAL
63 yo male, h/o type 2 DM on insulin, CAD s.p stent 2008, s/p CABG 2012 (Dr Smtih), PAD s/p angioplasty and stent b/l LE, BPH, ESRD on HD through left AVF (MWF follows with Dr MARY Paula), DFU sp left toe amputation, left calcaneal OM on daptomycin and cefepime post dialysis s.p wound Vac, chronic anemia, HFmrEF, presented to the hospital for chest pain   Pain is mid sternal, pressure like, discomfort rather than pain, occurring at rest, non radiating, exacerbated by cough, not similar to previous episodes of chest pain, not associated with tremor , diaphoresis, numbness, lightheadedness; Pain has improved with tylenol initially and then resolved with nitroglycerin SL   Patient denies fever, chills, shortness of breath, sputum, abdominal pain; ROS otherwise negative

## 2022-11-15 NOTE — PHYSICAL THERAPY INITIAL EVALUATION ADULT - PLANNED THERAPY INTERVENTIONS, PT EVAL
Initiate Treatment: Otezla one po bid, patient given starter pack and paperwork completed. Render In Strict Bullet Format?: No Detail Level: Zone gait training/strengthening/transfer training

## 2022-11-15 NOTE — PROGRESS NOTE ADULT - ASSESSMENT
63 yo male, h/o type 2 DM on insulin, CAD s.p stent 2008, s/p CABG 2012 (Dr Smith), PAD s/p angioplasty and stent b/l LE, BPH, ESRD on HD through left AVF (MWF follows with Dr MARY Paula), DFU sp left toe amputation, left calcaneal OM on daptomycin and cefepime post dialysis s.p wound Vac, chronic anemia, HFmrEF, presented to the hospital for chest pain. Found to have anemia s/p 1 unit of prbc.    Assessment and plan  ESRD on HD/ Anemia / Chest pain/ OM on abx  HD yesterday, next tomorow  anemia s/p 1 unit of prbc transfusion  RAMSEY weekly post HD   on sevelamer 800 mg po tid  Monitor BP post HD  abx for OM  Fluid restriction   will follow

## 2022-11-15 NOTE — PATIENT PROFILE ADULT - FALL HARM RISK - HARM RISK INTERVENTIONS

## 2022-11-15 NOTE — PROGRESS NOTE ADULT - ASSESSMENT
63 yo male, h/o type 2 DM on insulin, CAD s.p stent 2008, s/p CABG 2012 (Dr Smith), PAD s/p angioplasty and stent b/l LE, BPH, ESRD on HD through left AVF ([facility] follows with Dr MARY Paula), DFU sp left toe amputation, left calcaneal OM on daptomycin and cefepime post dialysis s.p wound Vac, chronic anemia, HFmrEF, presented to the hospital for chest pain     #Chest pain  #CAD s/p PCI/CABG  #PAD s/p angioplasty  #HTN/HLD  #Chronic HFmrEF  Troponins 0.70 in setting of ESRD, 0.25 back in 09/2022  No more chest pain overnight  - Telemetry monitoring  - Troponin stable between 0.7-0.8  - f/u echocardiogram  - f/u cardiology, Dr. Robertson  - c/w ASA 81 mg daily  - c/w Plavix 75 mg daily  - c/w atorvastatin 40 mg daily  - c/w Nifedipine 30 mg daily    # Anemia of chronic disease  hb on admission 6.6 baseline 7.6. s/p 1U PRBC  - Monitor CBC, transfuse to keep >8    # ESRD on HD   MWF  - HD per nephrology    #Recent hx Left calcaneal OM   - Wound Cx 9/16 - Enterobacter cloacae  - wound CX 10/16 VRE Faecim, Enterobacter cloacae complex, Proteus vulgaris, Morganella morganii   - s/p debridement 10/21 Wound Cx Proteus mirabilis, VRE faecium, Pseudomonas putida  - s/p debridement 10/24 Crumble L alcaenous   - s/p debridement 11/3 with wound vac in place  - Cefepime 2g post HD through 11/30; s/p 3 session of daptomycin post-HD  - On last admission, ID plan for 4 weeks from last debridement (11/3-11/30)  - Podiatry consulted, planning for repeat debridement, needs cardio clearance     # PAD  - s/p angiogram LLE with peroneal artery angioplasty and artherectomy 10/27  - Cont meds as above    # Type 2 DM  Continue insulin regimen    # BPH  - c/w Tamsulosin 0.4 mg daily    DVT PPX, heparin    Dispo: pending debridement and cardio reccs     Total time spent to complete patient's bedside assessment, review medical chart, discuss medical plan of care with covering medical team was more than 35 minutes  with >50% of time spent face to face with patient, discussion with patient/family and/or coordination of care    Christin Bonner, DO

## 2022-11-16 LAB
GLUCOSE BLDC GLUCOMTR-MCNC: 122 MG/DL — HIGH (ref 70–99)
GLUCOSE BLDC GLUCOMTR-MCNC: 171 MG/DL — HIGH (ref 70–99)
GLUCOSE BLDC GLUCOMTR-MCNC: 66 MG/DL — LOW (ref 70–99)
GLUCOSE BLDC GLUCOMTR-MCNC: 67 MG/DL — LOW (ref 70–99)
GLUCOSE BLDC GLUCOMTR-MCNC: 75 MG/DL — SIGNIFICANT CHANGE UP (ref 70–99)

## 2022-11-16 PROCEDURE — 99233 SBSQ HOSP IP/OBS HIGH 50: CPT

## 2022-11-16 RX ORDER — REGADENOSON 0.08 MG/ML
0.4 INJECTION, SOLUTION INTRAVENOUS ONCE
Refills: 0 | Status: DISCONTINUED | OUTPATIENT
Start: 2022-11-16 | End: 2022-12-08

## 2022-11-16 RX ADMIN — HEPARIN SODIUM 5000 UNIT(S): 5000 INJECTION INTRAVENOUS; SUBCUTANEOUS at 13:04

## 2022-11-16 RX ADMIN — TAMSULOSIN HYDROCHLORIDE 0.4 MILLIGRAM(S): 0.4 CAPSULE ORAL at 21:18

## 2022-11-16 RX ADMIN — FAMOTIDINE 20 MILLIGRAM(S): 10 INJECTION INTRAVENOUS at 12:48

## 2022-11-16 RX ADMIN — Medication 30 MILLIGRAM(S): at 06:42

## 2022-11-16 RX ADMIN — SENNA PLUS 2 TABLET(S): 8.6 TABLET ORAL at 21:18

## 2022-11-16 RX ADMIN — Medication 40 MILLIGRAM(S): at 06:43

## 2022-11-16 RX ADMIN — SEVELAMER CARBONATE 800 MILLIGRAM(S): 2400 POWDER, FOR SUSPENSION ORAL at 12:48

## 2022-11-16 RX ADMIN — HEPARIN SODIUM 5000 UNIT(S): 5000 INJECTION INTRAVENOUS; SUBCUTANEOUS at 21:18

## 2022-11-16 RX ADMIN — Medication 81 MILLIGRAM(S): at 12:48

## 2022-11-16 RX ADMIN — Medication 2: at 12:50

## 2022-11-16 RX ADMIN — HEPARIN SODIUM 5000 UNIT(S): 5000 INJECTION INTRAVENOUS; SUBCUTANEOUS at 06:43

## 2022-11-16 RX ADMIN — CLOPIDOGREL BISULFATE 75 MILLIGRAM(S): 75 TABLET, FILM COATED ORAL at 12:48

## 2022-11-16 RX ADMIN — ATORVASTATIN CALCIUM 40 MILLIGRAM(S): 80 TABLET, FILM COATED ORAL at 21:18

## 2022-11-16 RX ADMIN — CEFEPIME 100 MILLIGRAM(S): 1 INJECTION, POWDER, FOR SOLUTION INTRAMUSCULAR; INTRAVENOUS at 11:54

## 2022-11-16 RX ADMIN — Medication 10 UNIT(S): at 12:49

## 2022-11-16 RX ADMIN — INSULIN GLARGINE 30 UNIT(S): 100 INJECTION, SOLUTION SUBCUTANEOUS at 21:18

## 2022-11-16 NOTE — PROGRESS NOTE ADULT - SUBJECTIVE AND OBJECTIVE BOX
Pt seen and examined at bedside. No CP or SOB.          PAST MEDICAL & SURGICAL HISTORY:  CAD (coronary artery disease)  1 stent     S/P CABG (coronary artery bypass graft)  x4    Diabetes mellitus    Transient ischemic attack (TIA)  ;     Chronic kidney disease (CKD)  Stage IV    Hypertension    Stented coronary artery  in     Myocardial infarction  2012    PAD (peripheral artery disease)  S/p bypass left leg    HLD (hyperlipidemia)    BPH (benign prostatic hyperplasia)    Pain in left knee  s/p fall    OA (osteoarthritis)    [place] (hard of hearing)    Chronic anemia    S/P CABG (coronary artery bypass graft)      H/O arterial bypass of lower limb  Left Lower Extremity ()    History of surgery  Left CEA ()  Left Pinkie toe Amputation ()  CABG x 4 ()  Card cath - stent ()      AV fistula  2019  LEFT AV FISTULA        VITAL SIGNS (Last 24 hrs):  T(C): 36.1 (22 @ 12:42), Max: 36.1 (22 @ 12:42)  HR: 105 (22 @ 12:42) (93 - 105)  BP: 115/87 (22 @ 12:42) (115/87 - 152/79)  RR: 18 (22 @ 12:42) (18 - 18)  SpO2: 95% (22 @ 08:02) (95% - 98%)  Wt(kg): --  Daily Height in cm: 180.34 (2022 11:18)    Daily Weight in k.8 (2022 04:51)    I&O's Summary    2022 07:01  -  2022 18:51  --------------------------------------------------------  IN: 450 mL / OUT: 3000 mL / NET: -2550 mL        PHYSICAL EXAM:  GENERAL: NAD, well-developed  HEAD:  Atraumatic, Normocephalic  EYES: EOMI, PERRLA, conjunctiva and sclera clear  NECK: Supple, No JVD  CHEST/LUNG: Clear to auscultation bilaterally; No wheeze  HEART: Regular rate and rhythm; No murmurs, rubs, or gallops  ABDOMEN: Soft, Nontender, Nondistended; Bowel sounds present  EXTREMITIES:  2+ Peripheral Pulses, No clubbing, cyanosis, or edema, AVF  PSYCH: AAOx3  NEUROLOGY: non-focal  SKIN: No rashes or lesions    Labs Reviewed  Spoke to patient in regards to abnormal labs.    CBC Full  -  ( 15 Nov 2022 06:57 )  WBC Count : 7.37 K/uL  Hemoglobin : 7.8 g/dL  Hematocrit : 23.2 %  Platelet Count - Automated : 271 K/uL  Mean Cell Volume : 92.1 fL  Mean Cell Hemoglobin : 31.0 pg  Mean Cell Hemoglobin Concentration : 33.6 g/dL  Auto Neutrophil # : 5.58 K/uL  Auto Lymphocyte # : 0.82 K/uL  Auto Monocyte # : 0.59 K/uL  Auto Eosinophil # : 0.25 K/uL  Auto Basophil # : 0.10 K/uL  Auto Neutrophil % : 75.7 %  Auto Lymphocyte % : 11.1 %  Auto Monocyte % : 8.0 %  Auto Eosinophil % : 3.4 %  Auto Basophil % : 1.4 %    BMP:    11-15 @ 06:57    Blood Urea Nitrogen - 43  Calcium - 8.1  Carbond Dioxide - 27  Chloride - 94  Creatinine - 4.9  Glucose - 77  Potassium - 3.7  Sodium - 134      Hemoglobin A1c -     Urine Culture:        COVID Labs  CRP:  80.8 (22)      D-Dimer:            Imaging reviewed independently and reviewed read    < from: [facility] Duplex Lower Ext Vein Scan, Bilat (10.26.22 @ 07:52) >    Impression:    Bilateral lower extremity vein mapping with the above measurements.    < end of copied text >  < from: Xray Chest 1 View-PORTABLE IMMEDIATE (22 @ 21:56) >  Skeleton/soft tissues: Degenerative changes.    Impression:    No radiographic evidence of acute cardiopulmonary disease.    < end of copied text >      MEDICATIONS  (STANDING):  aspirin enteric coated 81 milliGRAM(s) Oral daily  atorvastatin 40 milliGRAM(s) Oral at bedtime  cefepime   IVPB 2000 milliGRAM(s) IV Intermittent <User Schedule>  clopidogrel Tablet 75 milliGRAM(s) Oral daily  darbepoetin Injectable Syringe 60 MICROGram(s) IV Push <User Schedule>  dextrose 5%. 1000 milliLiter(s) (100 mL/Hr) IV Continuous <Continuous>  dextrose 5%. 1000 milliLiter(s) (50 mL/Hr) IV Continuous <Continuous>  dextrose 50% Injectable 25 Gram(s) IV Push once  dextrose 50% Injectable 12.5 Gram(s) IV Push once  dextrose 50% Injectable 25 Gram(s) IV Push once  famotidine    Tablet 20 milliGRAM(s) Oral daily  furosemide    Tablet 40 milliGRAM(s) Oral daily  glucagon  Injectable 1 milliGRAM(s) IntraMuscular once  heparin   Injectable 5000 Unit(s) SubCutaneous every 8 hours  influenza   Vaccine 0.5 milliLiter(s) IntraMuscular once  insulin glargine Injectable (LANTUS) 30 Unit(s) SubCutaneous at bedtime  insulin lispro (ADMELOG) corrective regimen sliding scale   SubCutaneous three times a day before meals  insulin lispro Injectable (ADMELOG) 10 Unit(s) SubCutaneous three times a day before meals  NIFEdipine XL 30 milliGRAM(s) Oral daily  regadenoson Injectable 0.4 milliGRAM(s) IV Push once  senna 2 Tablet(s) Oral at bedtime  sevelamer carbonate 800 milliGRAM(s) Oral three times a day with meals  tamsulosin 0.4 milliGRAM(s) Oral at bedtime    MEDICATIONS  (PRN):  acetaminophen     Tablet .. 650 milliGRAM(s) Oral every 6 hours PRN Mild Pain (1 - 3)  dextrose Oral Gel 15 Gram(s) Oral once PRN Blood Glucose LESS THAN 70 milliGRAM(s)/deciliter

## 2022-11-16 NOTE — PROGRESS NOTE ADULT - ASSESSMENT
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 and cefepime post dialysis s.p wound Vac, chronic anemia, HFmrEF, presented to the hospital for chest pain     #Chest pain  #CAD s/p PCI/CABG  #PAD s/p angioplasty  #HTN/HLD  #Chronic HFmrEF  Troponins 0.70 in setting of ESRD, 0.25 back in 09/2022  - Telemetry monitoring  - Troponin stable between 0.7-0.8  - f/u echocardiogram  - f/u cardiology, Dr. Robertson, recc Lexiscan for cardiology clearance   - c/w ASA 81 mg daily  - c/w Plavix 75 mg daily  - c/w atorvastatin 40 mg daily  - c/w Nifedipine 30 mg daily    # Anemia of chronic disease  hb on admission 6.6 baseline 7.6. s/p 1U PRBC  - Monitor CBC, transfuse to keep >8    # ESRD on HD   MWF  - HD per nephrology    #Recent hx Left calcaneal OM   - Wound Cx 9/16 - Enterobacter cloacae  - wound CX 10/16 VRE Faecim, Enterobacter cloacae complex, Proteus vulgaris, Morganella morganii   - s/p debridement 10/21 Wound Cx Proteus mirabilis, VRE faecium, Pseudomonas putida  - s/p debridement 10/24 Crumble L alcaenous   - s/p debridement 11/3 with wound vac in place  - Cefepime 2g post HD through 11/30; s/p 3 session of daptomycin post-HD  - On last admission, ID plan for 4 weeks from last debridement (11/3-11/30)  - Podiatry consulted, planning for repeat debridement, needs cardio clearance  (pending stress test)     # PAD  - s/p angiogram LLE with peroneal artery angioplasty and artherectomy 10/27  - Cont meds as above    # Type 2 DM  Continue insulin regimen    # BPH  - c/w Tamsulosin 0.4 mg daily    DVT PPX, heparin    Dispo: pending debridement and cardio reccs, follow up sudheer scan, labs in am

## 2022-11-16 NOTE — PROGRESS NOTE ADULT - SUBJECTIVE AND OBJECTIVE BOX
SUBJECTIVE:    Patient is a 62y old Male who presents with a chief complaint of Chest pain (15 Nov 2022 20:00)    Overnight Events: Patient seen at bedside, no acute events overnight.  VS within acceptable range, few readings of elevated BP.    PAST MEDICAL & SURGICAL HISTORY  CAD (coronary artery disease)  1 stent 2008    S/P CABG (coronary artery bypass graft)  x4    Diabetes mellitus    Transient ischemic attack (TIA)  2017; 2008    Chronic kidney disease (CKD)  Stage IV    Hypertension    Stented coronary artery  in 2008    Myocardial infarction  2012    PAD (peripheral artery disease)  S/p bypass left leg    HLD (hyperlipidemia)    BPH (benign prostatic hyperplasia)    Pain in left knee  s/p fall    OA (osteoarthritis)    [place] (hard of hearing)    Chronic anemia    S/P CABG (coronary artery bypass graft)  2012    H/O arterial bypass of lower limb  Left Lower Extremity (2016)    History of surgery  Left CEA (2017)  Left Pinkie toe Amputation (2014)  CABG x 4 (2012)  Card cath - stent (2008)      AV fistula  2019  LEFT AV FISTULA      SOCIAL HISTORY:  Negative for smoking/alcohol/drug use.     ALLERGIES:  No Known Allergies    MEDICATIONS:  STANDING MEDICATIONS  aspirin enteric coated 81 milliGRAM(s) Oral daily  atorvastatin 40 milliGRAM(s) Oral at bedtime  cefepime   IVPB 2000 milliGRAM(s) IV Intermittent <User Schedule>  clopidogrel Tablet 75 milliGRAM(s) Oral daily  darbepoetin Injectable Syringe 60 MICROGram(s) IV Push <User Schedule>  dextrose 5%. 1000 milliLiter(s) IV Continuous <Continuous>  dextrose 5%. 1000 milliLiter(s) IV Continuous <Continuous>  dextrose 50% Injectable 25 Gram(s) IV Push once  dextrose 50% Injectable 12.5 Gram(s) IV Push once  dextrose 50% Injectable 25 Gram(s) IV Push once  famotidine    Tablet 20 milliGRAM(s) Oral daily  furosemide    Tablet 40 milliGRAM(s) Oral daily  glucagon  Injectable 1 milliGRAM(s) IntraMuscular once  heparin   Injectable 5000 Unit(s) SubCutaneous every 8 hours  influenza   Vaccine 0.5 milliLiter(s) IntraMuscular once  insulin glargine Injectable (LANTUS) 30 Unit(s) SubCutaneous at bedtime  insulin lispro (ADMELOG) corrective regimen sliding scale   SubCutaneous three times a day before meals  insulin lispro Injectable (ADMELOG) 10 Unit(s) SubCutaneous three times a day before meals  NIFEdipine XL 30 milliGRAM(s) Oral daily  senna 2 Tablet(s) Oral at bedtime  sevelamer carbonate 800 milliGRAM(s) Oral three times a day with meals  tamsulosin 0.4 milliGRAM(s) Oral at bedtime    PRN MEDICATIONS  acetaminophen     Tablet .. 650 milliGRAM(s) Oral every 6 hours PRN  dextrose Oral Gel 15 Gram(s) Oral once PRN    VITALS:   T(F): 96.8, Max: 97 (11-15-22 @ 16:20)  HR: 94 (90 - 97)  BP: 152/79 (119/60 - 152/79)  RR: 18 (18 - 18)  SpO2: 95% (95% - 98%)    LABS:                        7.8    7.37  )-----------( 271      ( 15 Nov 2022 06:57 )             23.2     11-15    134<L>  |  94<L>  |  43<H>  ----------------------------<  77  3.7   |  27  |  4.9<HH>    Ca    8.1<L>      15 Nov 2022 06:57  Phos  3.6     11-15  Mg     2.0     11-15                CARDIAC MARKERS ( 15 Nov 2022 06:57 )  x     / 0.79 ng/mL / 25 U/L / x     / x      CARDIAC MARKERS ( 14 Nov 2022 21:55 )  x     / 0.74 ng/mL / x     / x     / 2.3 ng/mL  CARDIAC MARKERS ( 14 Nov 2022 16:00 )  x     / 0.80 ng/mL / x     / x     / 2.8 ng/mL  CARDIAC MARKERS ( 14 Nov 2022 11:33 )  x     / 0.75 ng/mL / 35 U/L / x     / x            PHYSICAL EXAM:  Constitutional: NAD, pale   HEENT: anicteric sclera, oropharynx clear, MMM  Neck: No JVD  Respiratory: CTAB, no wheezes, rales or rhonchi  Cardiovascular: S1, S2, RRR  Gastrointestinal: BS+, soft, NT/ND  Extremities: No cyanosis or clubbing. b/l peripheral edema  Neurological: A/O x 3, no focal deficits  Psychiatric: Normal mood, normal affect  : No CVA tenderness.  Skin: No rashes  Vascular Access: AV fistula

## 2022-11-16 NOTE — PROGRESS NOTE ADULT - ASSESSMENT
63 yo male, h/o type 2 DM on insulin, CAD s.p stent 2008, s/p CABG 2012 (Dr Smith), PAD s/p angioplasty and stent b/l LE, BPH, ESRD on HD through left AVF (MWF follows with Dr MARY Paula), DFU sp left toe amputation, left calcaneal OM on daptomycin and cefepime post dialysis s.p wound Vac, chronic anemia, HFmrEF, presented to the hospital for chest pain. Found to have anemia s/p 1 unit of prbc.    Assessment and plan  ESRD on HD/ Anemia / Chest pain/ OM on abx  HD today, opti 160 3 hrs 3 L UF  anemia s/p 1 unit of prbc transfusion ,hgb stable  RAMSEY weekly post HD   on sevelamer 800 mg po tid  BP controlled  abx for OM  plan for debridement with podiatry   Fluid restriction   will follow

## 2022-11-17 LAB
ALBUMIN SERPL ELPH-MCNC: 3 G/DL — LOW (ref 3.5–5.2)
ALP SERPL-CCNC: 154 U/L — HIGH (ref 30–115)
ALT FLD-CCNC: <5 U/L — SIGNIFICANT CHANGE UP (ref 0–41)
ANION GAP SERPL CALC-SCNC: 9 MMOL/L — SIGNIFICANT CHANGE UP (ref 7–14)
AST SERPL-CCNC: 7 U/L — SIGNIFICANT CHANGE UP (ref 0–41)
BASOPHILS # BLD AUTO: 0.09 K/UL — SIGNIFICANT CHANGE UP (ref 0–0.2)
BASOPHILS NFR BLD AUTO: 1.1 % — HIGH (ref 0–1)
BILIRUB SERPL-MCNC: 0.3 MG/DL — SIGNIFICANT CHANGE UP (ref 0.2–1.2)
BUN SERPL-MCNC: 36 MG/DL — HIGH (ref 10–20)
CALCIUM SERPL-MCNC: 8 MG/DL — LOW (ref 8.4–10.5)
CHLORIDE SERPL-SCNC: 96 MMOL/L — LOW (ref 98–110)
CO2 SERPL-SCNC: 30 MMOL/L — SIGNIFICANT CHANGE UP (ref 17–32)
CREAT SERPL-MCNC: 5.1 MG/DL — CRITICAL HIGH (ref 0.7–1.5)
EGFR: 12 ML/MIN/1.73M2 — LOW
EOSINOPHIL # BLD AUTO: 0.26 K/UL — SIGNIFICANT CHANGE UP (ref 0–0.7)
EOSINOPHIL NFR BLD AUTO: 3.3 % — SIGNIFICANT CHANGE UP (ref 0–8)
GLUCOSE BLDC GLUCOMTR-MCNC: 118 MG/DL — HIGH (ref 70–99)
GLUCOSE BLDC GLUCOMTR-MCNC: 120 MG/DL — HIGH (ref 70–99)
GLUCOSE BLDC GLUCOMTR-MCNC: 126 MG/DL — HIGH (ref 70–99)
GLUCOSE BLDC GLUCOMTR-MCNC: 200 MG/DL — HIGH (ref 70–99)
GLUCOSE BLDC GLUCOMTR-MCNC: 54 MG/DL — CRITICAL LOW (ref 70–99)
GLUCOSE BLDC GLUCOMTR-MCNC: 86 MG/DL — SIGNIFICANT CHANGE UP (ref 70–99)
GLUCOSE SERPL-MCNC: 89 MG/DL — SIGNIFICANT CHANGE UP (ref 70–99)
HCT VFR BLD CALC: 23.3 % — LOW (ref 42–52)
HGB BLD-MCNC: 7.9 G/DL — LOW (ref 14–18)
IMM GRANULOCYTES NFR BLD AUTO: 0.5 % — HIGH (ref 0.1–0.3)
LYMPHOCYTES # BLD AUTO: 0.84 K/UL — LOW (ref 1.2–3.4)
LYMPHOCYTES # BLD AUTO: 10.6 % — LOW (ref 20.5–51.1)
MAGNESIUM SERPL-MCNC: 2 MG/DL — SIGNIFICANT CHANGE UP (ref 1.8–2.4)
MCHC RBC-ENTMCNC: 31.1 PG — HIGH (ref 27–31)
MCHC RBC-ENTMCNC: 33.9 G/DL — SIGNIFICANT CHANGE UP (ref 32–37)
MCV RBC AUTO: 91.7 FL — SIGNIFICANT CHANGE UP (ref 80–94)
MONOCYTES # BLD AUTO: 0.75 K/UL — HIGH (ref 0.1–0.6)
MONOCYTES NFR BLD AUTO: 9.5 % — HIGH (ref 1.7–9.3)
NEUTROPHILS # BLD AUTO: 5.91 K/UL — SIGNIFICANT CHANGE UP (ref 1.4–6.5)
NEUTROPHILS NFR BLD AUTO: 75 % — SIGNIFICANT CHANGE UP (ref 42.2–75.2)
NRBC # BLD: 0 /100 WBCS — SIGNIFICANT CHANGE UP (ref 0–0)
PHOSPHATE SERPL-MCNC: 3.1 MG/DL — SIGNIFICANT CHANGE UP (ref 2.1–4.9)
PLATELET # BLD AUTO: 311 K/UL — SIGNIFICANT CHANGE UP (ref 130–400)
POTASSIUM SERPL-MCNC: 4.2 MMOL/L — SIGNIFICANT CHANGE UP (ref 3.5–5)
POTASSIUM SERPL-SCNC: 4.2 MMOL/L — SIGNIFICANT CHANGE UP (ref 3.5–5)
PROT SERPL-MCNC: 6.9 G/DL — SIGNIFICANT CHANGE UP (ref 6–8)
RBC # BLD: 2.54 M/UL — LOW (ref 4.7–6.1)
RBC # FLD: 14.6 % — HIGH (ref 11.5–14.5)
SODIUM SERPL-SCNC: 135 MMOL/L — SIGNIFICANT CHANGE UP (ref 135–146)
TROPONIN T SERPL-MCNC: 0.55 NG/ML — CRITICAL HIGH
WBC # BLD: 7.89 K/UL — SIGNIFICANT CHANGE UP (ref 4.8–10.8)
WBC # FLD AUTO: 7.89 K/UL — SIGNIFICANT CHANGE UP (ref 4.8–10.8)

## 2022-11-17 PROCEDURE — 99232 SBSQ HOSP IP/OBS MODERATE 35: CPT

## 2022-11-17 RX ORDER — DEXTROSE 50 % IN WATER 50 %
25 SYRINGE (ML) INTRAVENOUS ONCE
Refills: 0 | Status: COMPLETED | OUTPATIENT
Start: 2022-11-17 | End: 2022-11-17

## 2022-11-17 RX ORDER — PREGABALIN 225 MG/1
1000 CAPSULE ORAL DAILY
Refills: 0 | Status: DISCONTINUED | OUTPATIENT
Start: 2022-11-17 | End: 2022-12-08

## 2022-11-17 RX ORDER — DEXTROSE 50 % IN WATER 50 %
12.5 SYRINGE (ML) INTRAVENOUS ONCE
Refills: 0 | Status: COMPLETED | OUTPATIENT
Start: 2022-11-17 | End: 2022-11-17

## 2022-11-17 RX ORDER — FOLIC ACID 0.8 MG
1 TABLET ORAL DAILY
Refills: 0 | Status: DISCONTINUED | OUTPATIENT
Start: 2022-11-17 | End: 2022-12-08

## 2022-11-17 RX ORDER — INSULIN GLARGINE 100 [IU]/ML
20 INJECTION, SOLUTION SUBCUTANEOUS AT BEDTIME
Refills: 0 | Status: DISCONTINUED | OUTPATIENT
Start: 2022-11-17 | End: 2022-11-19

## 2022-11-17 RX ORDER — METOPROLOL TARTRATE 50 MG
12.5 TABLET ORAL EVERY 12 HOURS
Refills: 0 | Status: DISCONTINUED | OUTPATIENT
Start: 2022-11-17 | End: 2022-12-08

## 2022-11-17 RX ORDER — INSULIN LISPRO 100/ML
5 VIAL (ML) SUBCUTANEOUS
Refills: 0 | Status: DISCONTINUED | OUTPATIENT
Start: 2022-11-17 | End: 2022-11-19

## 2022-11-17 RX ADMIN — CLOPIDOGREL BISULFATE 75 MILLIGRAM(S): 75 TABLET, FILM COATED ORAL at 11:57

## 2022-11-17 RX ADMIN — SENNA PLUS 2 TABLET(S): 8.6 TABLET ORAL at 21:49

## 2022-11-17 RX ADMIN — TAMSULOSIN HYDROCHLORIDE 0.4 MILLIGRAM(S): 0.4 CAPSULE ORAL at 21:48

## 2022-11-17 RX ADMIN — SEVELAMER CARBONATE 800 MILLIGRAM(S): 2400 POWDER, FOR SUSPENSION ORAL at 17:16

## 2022-11-17 RX ADMIN — HEPARIN SODIUM 5000 UNIT(S): 5000 INJECTION INTRAVENOUS; SUBCUTANEOUS at 05:28

## 2022-11-17 RX ADMIN — HEPARIN SODIUM 5000 UNIT(S): 5000 INJECTION INTRAVENOUS; SUBCUTANEOUS at 21:58

## 2022-11-17 RX ADMIN — SEVELAMER CARBONATE 800 MILLIGRAM(S): 2400 POWDER, FOR SUSPENSION ORAL at 07:46

## 2022-11-17 RX ADMIN — SEVELAMER CARBONATE 800 MILLIGRAM(S): 2400 POWDER, FOR SUSPENSION ORAL at 11:57

## 2022-11-17 RX ADMIN — Medication 81 MILLIGRAM(S): at 11:57

## 2022-11-17 RX ADMIN — FAMOTIDINE 20 MILLIGRAM(S): 10 INJECTION INTRAVENOUS at 11:57

## 2022-11-17 RX ADMIN — Medication 10 UNIT(S): at 07:47

## 2022-11-17 RX ADMIN — Medication 1 MILLIGRAM(S): at 11:57

## 2022-11-17 RX ADMIN — Medication 2: at 11:56

## 2022-11-17 RX ADMIN — Medication 5 UNIT(S): at 17:20

## 2022-11-17 RX ADMIN — Medication 12.5 MILLIGRAM(S): at 17:17

## 2022-11-17 RX ADMIN — Medication 12.5 GRAM(S): at 09:27

## 2022-11-17 RX ADMIN — Medication 30 MILLIGRAM(S): at 05:28

## 2022-11-17 RX ADMIN — HEPARIN SODIUM 5000 UNIT(S): 5000 INJECTION INTRAVENOUS; SUBCUTANEOUS at 13:14

## 2022-11-17 RX ADMIN — Medication 5 UNIT(S): at 11:56

## 2022-11-17 RX ADMIN — Medication 40 MILLIGRAM(S): at 05:28

## 2022-11-17 RX ADMIN — INSULIN GLARGINE 20 UNIT(S): 100 INJECTION, SOLUTION SUBCUTANEOUS at 21:47

## 2022-11-17 RX ADMIN — ATORVASTATIN CALCIUM 40 MILLIGRAM(S): 80 TABLET, FILM COATED ORAL at 21:48

## 2022-11-17 RX ADMIN — PREGABALIN 1000 MICROGRAM(S): 225 CAPSULE ORAL at 07:46

## 2022-11-17 NOTE — PROGRESS NOTE ADULT - ASSESSMENT
61 yo male, h/o type 2 DM on insulin, CAD s.p stent 2008, s/p CABG 2012 (Dr Smith), PAD s/p angioplasty and stent b/l LE, BPH, ESRD on HD through left AVF ([facility] follows with Dr MARY Paula), DFU sp left toe amputation, left calcaneal OM on daptomycin and cefepime post dialysis s.p wound Vac, chronic anemia, HFmrEF, presented to the hospital for chest pain     # Chest pain  # CAD s/p PCI/CABG  # PAD s/p angioplasty  # HTN/HLD  # Chronic HFmrEF  - Troponins 0.70 in setting of ESRD, 0.25 back in 09/2022  - Telemetry monitoring  - Troponin stable between 0.7-0.8  - f/u echocardiogram  - f/u cardiology, Dr. Robertson, [facility] Lexiscan for cardiology clearance   - c/w ASA 81 mg daily  - c/w Plavix 75 mg daily  - c/w atorvastatin 40 mg daily  - c/w Nifedipine 30 mg daily    # Anemia of chronic disease  - hb on admission 6.6 baseline 7.6. s/p 1U PRBC  - Monitor CBC, transfuse to keep >8    # ESRD on HD MWF  - HD per nephrology    # Recent hx Left calcaneal OM   - Wound Cx 9/16 - Enterobacter cloacae  - wound CX 10/16 VRE Faecim, Enterobacter cloacae complex, Proteus vulgaris, Morganella morganii   - s/p debridement 10/21 Wound Cx Proteus mirabilis, VRE faecium, Pseudomonas putida  - s/p debridement 10/24 Crumble L alcaenous   - s/p debridement 11/3 with wound vac in place  - Cefepime 2g post HD through 11/30; s/p 3 session of daptomycin post-HD  - On last admission, ID plan for 4 weeks from last debridement (11/3-11/30)  - Podiatry consulted, planning for repeat debridement, needs cardio clearance  (pending stress test)     # PAD  - s/p angiogram LLE with peroneal artery angioplasty and artherectomy 10/27  - Cont meds as above    # Type 2 DM  Continue insulin regimen    # BPH  - c/w Tamsulosin 0.4 mg daily    #Misc  - DVT prophylaxis: Heparin  - GI prophylaxis: Not indicated  - Diet: Renal, 1.2l fluid restriction  - Code status: Full code  - Activity: IAT- pending podiatry  - Dispo: pending debridement, lexiscan   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 and cefepime post dialysis s.p wound Vac, chronic anemia, HFmrEF, presented to the hospital for chest pain     # Chest pain, no further episode  # CAD s/p PCI, CABG  # HTN, stable  # HLD  # Chronic HFmrEF, resolved  - stable downtrending troponin  - c/w telemetry  - f/u cardiology, Dr. Robertson, recommended Lexiscan for cardiology clearance   - c/w aspirin, plavix, lipitor 40 mg, nifedipine 30 mg daily    # Anemia of chronic disease  - hb on admission 6.6 baseline 7.6. s/p 1U PRBC  - monitor CBC, transfuse to keep >8    # ESRD on HD MWF  - HD per nephrology    # Recent hx Left calcaneal OM   - wound Cx 9/16 Enterobacter cloacae  - wound CX 10/16 VRE Faecim, Enterobacter cloacae complex, Proteus vulgaris, Morganella morganii   - s/p debridement 10/21 Wound Cx Proteus mirabilis, VRE faecium, Pseudomonas putida  - s/p debridement 10/24 Crumble L alcaenous   - s/p debridement 11/3 with wound vac in place  - cefepime 2g post HD through 11/30; s/p 3 session of daptomycin post-HD  - on last admission, ID plan for 4 weeks from last debridement (11/3-11/30)  - Podiatry consulted, planning for repeat debridement, needs cardio clearance  (pending stress test)     # PAD  - s/p angiogram LLE with peroneal artery angioplasty and arthrectomy 10/27  - c/w aspirin, plavix and statin    # Type 2 DM  - monitor fsg glucose, c/w insulin regimen    # BPH  - c/w tamsulosin 0.4 mg daily    # DVT prophylaxis - on heparin subcut  # Code status - Full Code  Dispo: pending debridement, Lexiscan

## 2022-11-17 NOTE — PROGRESS NOTE ADULT - ASSESSMENT
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 and cefepime post dialysis s.p wound Vac, chronic anemia, HFmrEF, presented to the hospital for chest pain. Found to have anemia s/p 1 unit of prbc.  Assessment and plan  ESRD on HD/ Anemia / Chest pain/ OM on abx  s/p hd yesterday / hd today   RAMSEY weekly post HD   d/c lasix if anuric   on sevelamer 800 mg po tid  BP controlled  abx for OM  plan for debridement with podiatry /cardiology clearance   Fluid restriction   will follow

## 2022-11-17 NOTE — PROGRESS NOTE ADULT - ASSESSMENT
63 yo male, h/o type 2 DM on insulin, CAD s.p stent 2008, s/p CABG 2012 (Dr Smith), PAD s/p angioplasty and stent b/l LE, BPH, ESRD on HD through left AVF (MWF follows with Dr MARY Paula), DFU sp left toe amputation, left calcaneal OM on daptomycin and cefepime post dialysis s.p wound Vac, chronic anemia, HFmrEF, presented to the hospital for chest pain     #Chest pain  #CAD s/p PCI/CABG  #PAD s/p angioplasty  #HTN/HLD  #Chronic HFmrEF  Troponins 0.70 in setting of ESRD, 0.25 back in 09/2022  - Telemetry monitoring  - Troponin stable between 0.7-0.8  - f/u echocardiogram  - f/u cardiology, Dr. Robertson, [facility] Lexiscan for cardiology clearance   - c/w ASA 81 mg daily  - c/w Plavix 75 mg daily  - c/w atorvastatin 40 mg daily  - c/w Nifedipine 30 mg daily    # Anemia of chronic disease  hb on admission 6.6 baseline 7.6. s/p 1U PRBC  - Monitor CBC, transfuse to keep >8    # ESRD on HD   MWF  - HD per nephrology    #Recent hx Left calcaneal OM   - Wound Cx 9/16 - Enterobacter cloacae  - wound CX 10/16 VRE Faecim, Enterobacter cloacae complex, Proteus vulgaris, Morganella morganii   - s/p debridement 10/21 Wound Cx Proteus mirabilis, VRE faecium, Pseudomonas putida  - s/p debridement 10/24 Crumble L alcaenous   - s/p debridement 11/3 with wound vac in place  - Cefepime 2g post HD through 11/30; s/p 3 session of daptomycin post-HD  - On last admission, ID plan for 4 weeks from last debridement (11/3-11/30)  - Podiatry consulted, planning for repeat debridement, needs cardio clearance  (pending stress test)     # PAD  - s/p angiogram LLE with peroneal artery angioplasty and artherectomy 10/27  - Cont meds as above    # Type 2 DM  Continue insulin regimen    # BPH  - c/w Tamsulosin 0.4 mg daily    #Misc  -DVT prophylaxis: Heparin  -GI prophylaxis: Not indicated  -Diet: Renal, 1.2l fluid restriction  -Code status: Full code  -Activity: IAT- pending podiatry  -Dispo: pending debridement, lexiscan 63 yo male, h/o type 2 DM on insulin, CAD s.p stent 2008, s/p CABG 2012 (Dr Smith), PAD s/p angioplasty and stent b/l LE, BPH, ESRD on HD through left AVF (MWF follows with Dr MARY Paula), DFU sp left toe amputation, left calcaneal OM on daptomycin and cefepime post dialysis s.p wound Vac, chronic anemia, HFmrEF, presented to the hospital for chest pain     #Chest pain  #CAD s/p PCI/CABG  #PAD s/p angioplasty  #HTN/HLD  #Chronic HFmrEF  Troponins 0.70 in setting of ESRD, 0.25 back in 09/2022  - Telemetry monitoring  - Troponin stable between 0.7-0.8  - f/u echocardiogram  - f/u cardiology, Dr. Robertson, recc Lexiscan for cardiology clearance- Lexiscan to be done tomorrow  - c/w ASA 81 mg daily  - c/w Plavix 75 mg daily  - c/w atorvastatin 40 mg daily  - c/w Nifedipine 30 mg daily    # Anemia of chronic disease  hb on admission 6.6 baseline 7.6. s/p 1U PRBC  - Monitor CBC, transfuse to keep >8    # ESRD on HD   MWF  - HD per nephrology    #Recent hx Left calcaneal OM   - Wound Cx 9/16 - Enterobacter cloacae  - wound CX 10/16 VRE Faecim, Enterobacter cloacae complex, Proteus vulgaris, Morganella morganii   - s/p debridement 10/21 Wound Cx Proteus mirabilis, VRE faecium, Pseudomonas putida  - s/p debridement 10/24 Crumble L alcaenous   - s/p debridement 11/3 with wound vac in place  - Cefepime 2g post HD through 11/30; s/p 3 session of daptomycin post-HD  - On last admission, ID plan for 4 weeks from last debridement (11/3-11/30)  - Podiatry consulted, planning for repeat debridement, needs cardio clearance  (pending stress test)     # PAD  - s/p angiogram LLE with peroneal artery angioplasty and artherectomy 10/27  - Cont meds as above    # Type 2 DM  Continue insulin regimen    # BPH  - c/w Tamsulosin 0.4 mg daily    #Misc  -DVT prophylaxis: Heparin  -GI prophylaxis: Not indicated  -Diet: Renal, 1.2l fluid restriction  -Code status: Full code  -Activity: IAT- pending podiatry  -Dispo: pending debridement, lexiscan 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 and cefepime post dialysis s.p wound Vac, chronic anemia, HFmrEF, presented to the hospital for chest pain     #Chest pain  #CAD s/p PCI/CABG  #PAD s/p angioplasty  #HTN/HLD  #Chronic HFmrEF  Troponins 0.70 in setting of ESRD, 0.25 back in 09/2022  - Telemetry monitoring  - Troponin stable between 0.7-0.8  - f/u echocardiogram  - f/u cardiology, Dr. Robertson, [facility] Lexiscan for cardiology clearance- Lexiscan to be done tomorrow  - c/w ASA 81 mg daily  - c/w Plavix 75 mg daily  - c/w atorvastatin 40 mg daily  - c/w Nifedipine 30 mg daily    #NSVT  -10 beats of NSVT observed on tele on 11/16 PM  -Started Metoprolol 12.5 BID  -Maintain K>4 and Mg>2    # Anemia of chronic disease  hb on admission 6.6 baseline 7.6. s/p 1U PRBC  - Monitor CBC, transfuse to keep >8    # ESRD on HD   MWF  - HD per nephrology    #Recent hx Left calcaneal OM   - Wound Cx 9/16 - Enterobacter cloacae  - wound CX 10/16 VRE Faecim, Enterobacter cloacae complex, Proteus vulgaris, Morganella morganii   - s/p debridement 10/21 Wound Cx Proteus mirabilis, VRE faecium, Pseudomonas putida  - s/p debridement 10/24 Crumble L alcaenous   - s/p debridement 11/3 with wound vac in place  - Cefepime 2g post HD through 11/30; s/p 3 session of daptomycin post-HD  - On last admission, ID plan for 4 weeks from last debridement (11/3-11/30)  - Podiatry consulted, planning for repeat debridement, needs cardio clearance  (pending stress test)     # PAD  - s/p angiogram LLE with peroneal artery angioplasty and artherectomy 10/27  - Cont meds as above    # Type 2 DM  Continue insulin regimen    # BPH  - c/w Tamsulosin 0.4 mg daily    #Misc  -DVT prophylaxis: Heparin  -GI prophylaxis: Not indicated  -Diet: Renal, 1.2l fluid restriction  -Code status: Full code  -Activity: IAT- pending podiatry  -Dispo: pending debridement, lexiscan

## 2022-11-17 NOTE — PROGRESS NOTE ADULT - SUBJECTIVE AND OBJECTIVE BOX
SUBJECTIVE:  HPI:  63 yo male, h/o type 2 DM on insulin, CAD s.p stent 2008, s/p CABG 2012 (Dr Smith), PAD s/p angioplasty and stent b/l LE, BPH, ESRD on HD through left AVF (MWF follows with Dr MARY Paula), DFU sp left toe amputation, left calcaneal OM on daptomycin and cefepime post dialysis s.p wound Vac, chronic anemia, HFmrEF, presented to the hospital for chest pain   Pain is mid sternal, pressure like, discomfort rather than pain, occurring at rest, non radiating, exacerbated by cough, not similar to previous episodes of chest pain, not associated with tremor , diaphoresis, numbness, lightheadedness; Pain has improved with tylenol initially and then resolved with nitroglycerin SL   Patient denies fever, chills, shortness of breath, sputum, abdominal pain; ROS otherwise negative   (14 Nov 2022 08:13)      Patient is a 62y old Male who presents with a chief complaint of Chest pain (16 Nov 2022 18:51)    Currently admitted to medicine with the primary diagnosis of Anemia       Today is hospital day 3d.     PAST MEDICAL & SURGICAL HISTORY  CAD (coronary artery disease)  1 stent 2008    S/P CABG (coronary artery bypass graft)  x4    Diabetes mellitus    Transient ischemic attack (TIA)  2017; 2008    Chronic kidney disease (CKD)  Stage IV    Hypertension    Stented coronary artery  in 2008    Myocardial infarction  2012    PAD (peripheral artery disease)  S/p bypass left leg    HLD (hyperlipidemia)    BPH (benign prostatic hyperplasia)    Pain in left knee  s/p fall    OA (osteoarthritis)    [place] (hard of hearing)    Chronic anemia    S/P CABG (coronary artery bypass graft)  2012    H/O arterial bypass of lower limb  Left Lower Extremity (2016)    History of surgery  Left CEA (2017)  Left Pinkie toe Amputation (2014)  CABG x 4 (2012)  Card cath - stent (2008)      AV fistula  2019  LEFT AV FISTULA        ALLERGIES:  No Known Allergies    MEDICATIONS:  ACTIVE MEDICATIONS  acetaminophen     Tablet .. 650 milliGRAM(s) Oral every 6 hours PRN  aspirin enteric coated 81 milliGRAM(s) Oral daily  atorvastatin 40 milliGRAM(s) Oral at bedtime  cefepime   IVPB 2000 milliGRAM(s) IV Intermittent <User Schedule>  clopidogrel Tablet 75 milliGRAM(s) Oral daily  cyanocobalamin 1000 MICROGram(s) Oral daily  darbepoetin Injectable Syringe 60 MICROGram(s) IV Push <User Schedule>  dextrose 5%. 1000 milliLiter(s) IV Continuous <Continuous>  dextrose 5%. 1000 milliLiter(s) IV Continuous <Continuous>  dextrose 50% Injectable 25 Gram(s) IV Push once  dextrose 50% Injectable 12.5 Gram(s) IV Push once  dextrose 50% Injectable 25 Gram(s) IV Push once  dextrose Oral Gel 15 Gram(s) Oral once PRN  famotidine    Tablet 20 milliGRAM(s) Oral daily  folic acid 1 milliGRAM(s) Oral daily  furosemide    Tablet 40 milliGRAM(s) Oral daily  glucagon  Injectable 1 milliGRAM(s) IntraMuscular once  heparin   Injectable 5000 Unit(s) SubCutaneous every 8 hours  influenza   Vaccine 0.5 milliLiter(s) IntraMuscular once  insulin glargine Injectable (LANTUS) 30 Unit(s) SubCutaneous at bedtime  insulin lispro (ADMELOG) corrective regimen sliding scale   SubCutaneous three times a day before meals  insulin lispro Injectable (ADMELOG) 10 Unit(s) SubCutaneous three times a day before meals  NIFEdipine XL 30 milliGRAM(s) Oral daily  regadenoson Injectable 0.4 milliGRAM(s) IV Push once  senna 2 Tablet(s) Oral at bedtime  sevelamer carbonate 800 milliGRAM(s) Oral three times a day with meals  tamsulosin 0.4 milliGRAM(s) Oral at bedtime      VITALS:   T(F): 97  HR: 96  BP: 137/63  RR: 18  SpO2: 98%    LABS:                        7.8    7.37  )-----------( 271      ( 15 Nov 2022 06:57 )             23.2     11-15    134<L>  |  94<L>  |  43<H>  ----------------------------<  77  3.7   |  27  |  4.9<HH>    Ca    8.1<L>      15 Nov 2022 06:57  Phos  3.6     11-15  Mg     2.0     11-15                CARDIAC MARKERS ( 15 Nov 2022 06:57 )  x     / 0.79 ng/mL / 25 U/L / x     / x              PHYSICAL EXAM:  GEN: No acute distress  LUNGS: Clear to auscultation bilaterally   HEART: S1/S2 present.    ABD: Soft, non-tender, non-distended.   EXT: No pedal edema  NEURO: AAOX3

## 2022-11-17 NOTE — PROGRESS NOTE ADULT - SUBJECTIVE AND OBJECTIVE BOX
seen and examined  no new complaints   s/p hd yesterday         PAST HISTORY  --------------------------------------------------------------------------------  No significant changes to PMH, PSH, FHx, SHx, unless otherwise noted    ALLERGIES & MEDICATIONS  --------------------------------------------------------------------------------  Allergies    No Known Allergies    Intolerances      Standing Inpatient Medications  aspirin enteric coated 81 milliGRAM(s) Oral daily  atorvastatin 40 milliGRAM(s) Oral at bedtime  cefepime   IVPB 2000 milliGRAM(s) IV Intermittent <User Schedule>  clopidogrel Tablet 75 milliGRAM(s) Oral daily  cyanocobalamin 1000 MICROGram(s) Oral daily  darbepoetin Injectable Syringe 60 MICROGram(s) IV Push <User Schedule>  dextrose 5%. 1000 milliLiter(s) IV Continuous <Continuous>  dextrose 5%. 1000 milliLiter(s) IV Continuous <Continuous>  dextrose 50% Injectable 25 Gram(s) IV Push once  dextrose 50% Injectable 12.5 Gram(s) IV Push once  dextrose 50% Injectable 25 Gram(s) IV Push once  famotidine    Tablet 20 milliGRAM(s) Oral daily  folic acid 1 milliGRAM(s) Oral daily  furosemide    Tablet 40 milliGRAM(s) Oral daily  glucagon  Injectable 1 milliGRAM(s) IntraMuscular once  heparin   Injectable 5000 Unit(s) SubCutaneous every 8 hours  influenza   Vaccine 0.5 milliLiter(s) IntraMuscular once  insulin glargine Injectable (LANTUS) 30 Unit(s) SubCutaneous at bedtime  insulin lispro (ADMELOG) corrective regimen sliding scale   SubCutaneous three times a day before meals  insulin lispro Injectable (ADMELOG) 10 Unit(s) SubCutaneous three times a day before meals  NIFEdipine XL 30 milliGRAM(s) Oral daily  regadenoson Injectable 0.4 milliGRAM(s) IV Push once  senna 2 Tablet(s) Oral at bedtime  sevelamer carbonate 800 milliGRAM(s) Oral three times a day with meals  tamsulosin 0.4 milliGRAM(s) Oral at bedtime    PRN Inpatient Medications  acetaminophen     Tablet .. 650 milliGRAM(s) Oral every 6 hours PRN  dextrose Oral Gel 15 Gram(s) Oral once PRN      VITALS/PHYSICAL EXAM  --------------------------------------------------------------------------------  T(C): 36.1 (11-16-22 @ 21:00), Max: 36.1 (11-16-22 @ 12:42)  HR: 96 (11-17-22 @ 05:17) (94 - 105)  BP: 137/63 (11-17-22 @ 05:17) (115/87 - 163/65)  RR: 18 (11-17-22 @ 05:17) (18 - 18)  SpO2: 98% (11-17-22 @ 05:17) (98% - 98%)  Wt(kg): --  Height (cm): 180.3 (11-16-22 @ 11:18)  Weight (kg): 90.8 (11-16-22 @ 11:18)  BMI (kg/m2): 27.9 (11-16-22 @ 11:18)  BSA (m2): 2.11 (11-16-22 @ 11:18)      11-16-22 @ 07:01  -  11-17-22 @ 07:00  --------------------------------------------------------  IN: 450 mL / OUT: 3000 mL / NET: -2550 mL      Physical Exam:  	Gen: NAD  	Pulm: decrease BS  B/L  	CV: S1S2; no rub  	Abd:  soft, nontender/nondistended  	LE: dressing   	Vascular access: av     LABS/STUDIES  --------------------------------------------------------------------------------      Creatinine Trend:  SCr 4.9 [11-15 @ 06:57]  SCr 6.3 [11-14 @ 11:33]  SCr 6.0 [11-13 @ 23:01]  SCr 5.0 [11-08 @ 06:03]  SCr 6.8 [11-07 @ 06:16]    Urinalysis - [10-23-22 @ 18:53]      Color Yellow / Appearance Slightly Turbid / SG 1.011 / pH 8.5      Gluc 200 mg/dL / Ketone Negative  / Bili Negative / Urobili <2 mg/dL       Blood Trace / Protein 100 mg/dL / Leuk Est Small / Nitrite Negative      RBC 6 / WBC 13 / Hyaline 2 / Gran  / Sq Epi  / Non Sq Epi 9 / Bacteria Negative      Iron 39, TIBC 133, %sat 29      [10-21-22 @ 10:30]  Ferritin 1126      [10-21-22 @ 10:30]  PTH -- (Ca 7.5)      [02-26-22 @ 16:00]   312  Vitamin D (25OH) 21      [02-26-22 @ 16:00]  HbA1c 5.8      [01-30-20 @ 06:46]  Lipid: chol 81, , HDL 22, LDL --      [10-15-22 @ 09:53]    HBsAb Nonreact      [11-03-22 @ 06:40]  HBsAg Nonreact      [11-03-22 @ 06:40]  HBcAb Nonreact      [11-03-22 @ 06:40]  HCV 0.14, Nonreact      [10-27-22 @ 04:09]

## 2022-11-17 NOTE — PROGRESS NOTE ADULT - SUBJECTIVE AND OBJECTIVE BOX
SCHWABACHER, LAWRENCE  62y Male    Patient is a 62y old  Male who presents with a chief complaint of chest pain    INTERVAL HPI/OVERNIGHT EVENTS:    ROS: Pt denies fever, chest pain, chills, SOB, palpitations, nausea, vomiting, abdominal pain, diarrhea, constipation, rash, muscle or joint pain.    Overnight events: No acute events overnight. 10 beats of NSVT noted today.     Vital Signs Last 24 Hrs  T(C): 36.1 (16 Nov 2022 21:00), Max: 36.1 (16 Nov 2022 21:00)  T(F): 97 (16 Nov 2022 21:00), Max: 97 (16 Nov 2022 21:00)  HR: 87 (17 Nov 2022 14:31) (87 - 96)  BP: 137/63 (17 Nov 2022 05:17) (137/63 - 163/65)  BP(mean): 65 (17 Nov 2022 14:31) (65 - 65)  RR: 18 (17 Nov 2022 14:31) (18 - 18)  SpO2: 98% (17 Nov 2022 05:17) (98% - 98%)    No Known Allergies    MEDICATIONS  (STANDING):  aspirin enteric coated 81 milliGRAM(s) Oral daily  atorvastatin 40 milliGRAM(s) Oral at bedtime  cefepime   IVPB 2000 milliGRAM(s) IV Intermittent <User Schedule>  clopidogrel Tablet 75 milliGRAM(s) Oral daily  cyanocobalamin 1000 MICROGram(s) Oral daily  darbepoetin Injectable Syringe 60 MICROGram(s) IV Push <User Schedule>  dextrose 5%. 1000 milliLiter(s) (100 mL/Hr) IV Continuous <Continuous>  dextrose 5%. 1000 milliLiter(s) (50 mL/Hr) IV Continuous <Continuous>  dextrose 50% Injectable 25 Gram(s) IV Push once  dextrose 50% Injectable 12.5 Gram(s) IV Push once  dextrose 50% Injectable 25 Gram(s) IV Push once  famotidine    Tablet 20 milliGRAM(s) Oral daily  folic acid 1 milliGRAM(s) Oral daily  furosemide    Tablet 40 milliGRAM(s) Oral daily  glucagon  Injectable 1 milliGRAM(s) IntraMuscular once  heparin   Injectable 5000 Unit(s) SubCutaneous every 8 hours  influenza   Vaccine 0.5 milliLiter(s) IntraMuscular once  insulin glargine Injectable (LANTUS) 20 Unit(s) SubCutaneous at bedtime  insulin lispro (ADMELOG) corrective regimen sliding scale   SubCutaneous three times a day before meals  insulin lispro Injectable (ADMELOG) 5 Unit(s) SubCutaneous three times a day before meals  metoprolol tartrate 12.5 milliGRAM(s) Oral every 12 hours  NIFEdipine XL 30 milliGRAM(s) Oral daily  regadenoson Injectable 0.4 milliGRAM(s) IV Push once  senna 2 Tablet(s) Oral at bedtime  sevelamer carbonate 800 milliGRAM(s) Oral three times a day with meals  tamsulosin 0.4 milliGRAM(s) Oral at bedtime    MEDICATIONS  (PRN):  acetaminophen     Tablet .. 650 milliGRAM(s) Oral every 6 hours PRN Mild Pain (1 - 3)  dextrose Oral Gel 15 Gram(s) Oral once PRN Blood Glucose LESS THAN 70 milliGRAM(s)/deciliter      PHYSICAL EXAM:  General Appearance: NAD, normal for age and gender. 	  Neck: Normal JVP, no bruit.   Eyes: Conjunctiva clear, Extra Ocular muscles intact. No scleral icterus.  ENMT: Moist oral mucosa. No oral lesion.  Cardiovascular: Regular rate and rhythm S1 S2, No JVD, No murmurs.  Respiratory: Lungs clear to auscultation. No wheezes, rales or rhonchi.  Psychiatry: Alert and oriented x 3, Mood & affect appropriate.  Gastrointestinal:  Soft, Non-tender, Non-distended.  Neurologic: Non-focal deficits.  Musculoskeletal/ extremities: Normal range of motion, No clubbing, cyanosis. Left LE swelling with venous stasis changes. No warmth. No right LE swelling.   Vascular: Left forearm AVF fistula with palpable thrill.  Skin/Integumen: No rashes, No ecchymoses, No cyanosis.    LABS:                        7.9    7.89  )-----------( 311      ( 17 Nov 2022 07:23 )             23.3     11-17    135  |  96<L>  |  36<H>  ----------------------------<  89  4.2   |  30  |  5.1<HH>    Ca    8.0<L>      17 Nov 2022 07:23  Phos  3.1     11-17  Mg     2.0     11-17    TPro  6.9  /  Alb  3.0<L>  /  TBili  0.3  /  DBili  x   /  AST  7   /  ALT  <5  /  AlkPhos  154<H>  11-17    RADIOLOGY & ADDITIONAL TESTS:

## 2022-11-18 LAB
ALBUMIN SERPL ELPH-MCNC: 3.2 G/DL — LOW (ref 3.5–5.2)
ALP SERPL-CCNC: 169 U/L — HIGH (ref 30–115)
ALT FLD-CCNC: <5 U/L — SIGNIFICANT CHANGE UP (ref 0–41)
ANION GAP SERPL CALC-SCNC: 12 MMOL/L — SIGNIFICANT CHANGE UP (ref 7–14)
AST SERPL-CCNC: 7 U/L — SIGNIFICANT CHANGE UP (ref 0–41)
BASOPHILS # BLD AUTO: 0.1 K/UL — SIGNIFICANT CHANGE UP (ref 0–0.2)
BASOPHILS NFR BLD AUTO: 1.2 % — HIGH (ref 0–1)
BILIRUB SERPL-MCNC: 0.3 MG/DL — SIGNIFICANT CHANGE UP (ref 0.2–1.2)
BUN SERPL-MCNC: 49 MG/DL — HIGH (ref 10–20)
CALCIUM SERPL-MCNC: 7.9 MG/DL — LOW (ref 8.4–10.5)
CHLORIDE SERPL-SCNC: 94 MMOL/L — LOW (ref 98–110)
CO2 SERPL-SCNC: 27 MMOL/L — SIGNIFICANT CHANGE UP (ref 17–32)
CREAT SERPL-MCNC: 5.6 MG/DL — CRITICAL HIGH (ref 0.7–1.5)
EGFR: 11 ML/MIN/1.73M2 — LOW
EOSINOPHIL # BLD AUTO: 0.26 K/UL — SIGNIFICANT CHANGE UP (ref 0–0.7)
EOSINOPHIL NFR BLD AUTO: 3.1 % — SIGNIFICANT CHANGE UP (ref 0–8)
GLUCOSE BLDC GLUCOMTR-MCNC: 124 MG/DL — HIGH (ref 70–99)
GLUCOSE BLDC GLUCOMTR-MCNC: 149 MG/DL — HIGH (ref 70–99)
GLUCOSE BLDC GLUCOMTR-MCNC: 169 MG/DL — HIGH (ref 70–99)
GLUCOSE SERPL-MCNC: 79 MG/DL — SIGNIFICANT CHANGE UP (ref 70–99)
HCT VFR BLD CALC: 22.8 % — LOW (ref 42–52)
HGB BLD-MCNC: 7.2 G/DL — LOW (ref 14–18)
IMM GRANULOCYTES NFR BLD AUTO: 0.4 % — HIGH (ref 0.1–0.3)
LYMPHOCYTES # BLD AUTO: 1.15 K/UL — LOW (ref 1.2–3.4)
LYMPHOCYTES # BLD AUTO: 13.8 % — LOW (ref 20.5–51.1)
MAGNESIUM SERPL-MCNC: 2.1 MG/DL — SIGNIFICANT CHANGE UP (ref 1.8–2.4)
MCHC RBC-ENTMCNC: 29.9 PG — SIGNIFICANT CHANGE UP (ref 27–31)
MCHC RBC-ENTMCNC: 31.6 G/DL — LOW (ref 32–37)
MCV RBC AUTO: 94.6 FL — HIGH (ref 80–94)
MONOCYTES # BLD AUTO: 0.76 K/UL — HIGH (ref 0.1–0.6)
MONOCYTES NFR BLD AUTO: 9.1 % — SIGNIFICANT CHANGE UP (ref 1.7–9.3)
NEUTROPHILS # BLD AUTO: 6.04 K/UL — SIGNIFICANT CHANGE UP (ref 1.4–6.5)
NEUTROPHILS NFR BLD AUTO: 72.4 % — SIGNIFICANT CHANGE UP (ref 42.2–75.2)
NRBC # BLD: 0 /100 WBCS — SIGNIFICANT CHANGE UP (ref 0–0)
PHOSPHATE SERPL-MCNC: 3.6 MG/DL — SIGNIFICANT CHANGE UP (ref 2.1–4.9)
PLATELET # BLD AUTO: 320 K/UL — SIGNIFICANT CHANGE UP (ref 130–400)
POTASSIUM SERPL-MCNC: 4.4 MMOL/L — SIGNIFICANT CHANGE UP (ref 3.5–5)
POTASSIUM SERPL-SCNC: 4.4 MMOL/L — SIGNIFICANT CHANGE UP (ref 3.5–5)
PROT SERPL-MCNC: 7.1 G/DL — SIGNIFICANT CHANGE UP (ref 6–8)
RBC # BLD: 2.41 M/UL — LOW (ref 4.7–6.1)
RBC # FLD: 14.7 % — HIGH (ref 11.5–14.5)
SODIUM SERPL-SCNC: 133 MMOL/L — LOW (ref 135–146)
WBC # BLD: 8.34 K/UL — SIGNIFICANT CHANGE UP (ref 4.8–10.8)
WBC # FLD AUTO: 8.34 K/UL — SIGNIFICANT CHANGE UP (ref 4.8–10.8)

## 2022-11-18 PROCEDURE — 93016 CV STRESS TEST SUPVJ ONLY: CPT

## 2022-11-18 PROCEDURE — 93306 TTE W/DOPPLER COMPLETE: CPT | Mod: 26

## 2022-11-18 PROCEDURE — 99232 SBSQ HOSP IP/OBS MODERATE 35: CPT

## 2022-11-18 PROCEDURE — 78452 HT MUSCLE IMAGE SPECT MULT: CPT | Mod: 26

## 2022-11-18 PROCEDURE — 93018 CV STRESS TEST I&R ONLY: CPT

## 2022-11-18 RX ORDER — LANOLIN ALCOHOL/MO/W.PET/CERES
3 CREAM (GRAM) TOPICAL AT BEDTIME
Refills: 0 | Status: DISCONTINUED | OUTPATIENT
Start: 2022-11-18 | End: 2022-12-08

## 2022-11-18 RX ADMIN — CEFEPIME 100 MILLIGRAM(S): 1 INJECTION, POWDER, FOR SOLUTION INTRAMUSCULAR; INTRAVENOUS at 15:19

## 2022-11-18 RX ADMIN — SENNA PLUS 2 TABLET(S): 8.6 TABLET ORAL at 21:56

## 2022-11-18 RX ADMIN — HEPARIN SODIUM 5000 UNIT(S): 5000 INJECTION INTRAVENOUS; SUBCUTANEOUS at 21:57

## 2022-11-18 RX ADMIN — Medication 5 UNIT(S): at 16:58

## 2022-11-18 RX ADMIN — ATORVASTATIN CALCIUM 40 MILLIGRAM(S): 80 TABLET, FILM COATED ORAL at 21:56

## 2022-11-18 RX ADMIN — Medication 40 MILLIGRAM(S): at 05:25

## 2022-11-18 RX ADMIN — CLOPIDOGREL BISULFATE 75 MILLIGRAM(S): 75 TABLET, FILM COATED ORAL at 18:57

## 2022-11-18 RX ADMIN — Medication 12.5 MILLIGRAM(S): at 05:24

## 2022-11-18 RX ADMIN — Medication 30 MILLIGRAM(S): at 05:24

## 2022-11-18 RX ADMIN — INSULIN GLARGINE 20 UNIT(S): 100 INJECTION, SOLUTION SUBCUTANEOUS at 21:57

## 2022-11-18 RX ADMIN — Medication 12.5 MILLIGRAM(S): at 18:57

## 2022-11-18 RX ADMIN — Medication 3 MILLIGRAM(S): at 00:39

## 2022-11-18 RX ADMIN — TAMSULOSIN HYDROCHLORIDE 0.4 MILLIGRAM(S): 0.4 CAPSULE ORAL at 21:56

## 2022-11-18 RX ADMIN — HEPARIN SODIUM 5000 UNIT(S): 5000 INJECTION INTRAVENOUS; SUBCUTANEOUS at 05:25

## 2022-11-18 NOTE — DIETITIAN INITIAL EVALUATION ADULT - NAME AND PHONE
Nutrition Intervention:meals and snacks  Nutrition Monitoring:diet order,energy intake,body composition,NFPF, renal/electrolyte/glucose profile

## 2022-11-18 NOTE — PROGRESS NOTE ADULT - ASSESSMENT
63 yo male, h/o type 2 DM on insulin, CAD s.p stent 2008, s/p CABG 2012 (Dr Smith), PAD s/p angioplasty and stent b/l LE, BPH, ESRD on HD through left AVF (MWF follows with Dr MARY Paula), DFU sp left toe amputation, left calcaneal OM on daptomycin and cefepime post dialysis s.p wound Vac, chronic anemia, HFmrEF, presented to the hospital for chest pain     # Chest pain, no further episode  # CAD s/p PCI, CABG  # HTN, stable  # HLD  # Chronic HFmrEF, resolved  - stable downtrending troponin  - c/w telemetry  - f/u cardiology regarding clearance, Dr. Robertson, Lexiscan without new ischemia, noted chronic old scar  - c/w aspirin, plavix, lipitor 40 mg, nifedipine 30 mg daily    # Anemia of chronic disease  - hb on admission 6.6 baseline 7.6. s/p 1U PRBC  - monitor CBC, transfuse to keep >8    # ESRD on HD MWF  - HD per nephrology    # Left foot ulcer  # Recent hx Left calcaneal OM   - wound Cx 9/16 Enterobacter cloacae  - wound CX 10/16 VRE Faecim, Enterobacter cloacae complex, Proteus vulgaris, Morganella morganii   - s/p debridement 10/21 Wound Cx Proteus mirabilis, VRE faecium, Pseudomonas putida  - s/p debridement 10/24 [place]rumble L alcaenous   - s/p debridement 11/3 with wound vac in place  - cefepime 2g post HD through 11/30; s/p 3 session of daptomycin post-HD  - on last admission, ID plan for 4 weeks from last debridement (11/3-11/30)  - Podiatry consulted, planning for repeat debridement, needs cardio clearance    # PAD  - s/p angiogram LLE with peroneal artery angioplasty and arthrectomy 10/27  - c/w aspirin, plavix and statin    # Type 2 DM, low episode of fsg today  - monitor fsg glucose, insulin regimen decreased, will monitor fsg after    # BPH  - c/w tamsulosin 0.4 mg daily    # DVT prophylaxis - on heparin subcut  # Code status - Full Code  Dispo: pending debridement of left foot ulcer

## 2022-11-18 NOTE — DIETITIAN INITIAL EVALUATION ADULT - ADD RECOMMEND
1. cont w/ current diet order   2. will f/u and assess need for supplements   3. encourage and assist w/ po intake

## 2022-11-18 NOTE — DIETITIAN INITIAL EVALUATION ADULT - PERTINENT LABORATORY DATA
11-18    133<L>  |  94<L>  |  49<H>  ----------------------------<  79  4.4   |  27  |  5.6<HH>    Ca    7.9<L>      18 Nov 2022 05:57  Phos  3.6     11-18  Mg     2.1     11-18    TPro  7.1  /  Alb  3.2<L>  /  TBili  0.3  /  DBili  x   /  AST  7   /  ALT  <5  /  AlkPhos  169<H>  11-18  POCT Blood Glucose.: 120 mg/dL (11-17-22 @ 21:38)  A1C with Estimated Average Glucose Result: 6.8 % (10-15-22 @ 09:53)  A1C with Estimated Average Glucose Result: 6.1 % (09-15-22 @ 09:24)  A1C with Estimated Average Glucose Result: 6.2 % (08-09-22 @ 11:26)

## 2022-11-18 NOTE — PROGRESS NOTE ADULT - SUBJECTIVE AND OBJECTIVE BOX
Nephrology progress note    Patient is seen and examined, events over the last 24 h noted .  Denies SOB, going for a 2D Echo  Allergies:  No Known Allergies    Hospital Medications:   MEDICATIONS  (STANDING):  aspirin enteric coated 81 milliGRAM(s) Oral daily  atorvastatin 40 milliGRAM(s) Oral at bedtime  cefepime   IVPB 2000 milliGRAM(s) IV Intermittent <User Schedule>  clopidogrel Tablet 75 milliGRAM(s) Oral daily  cyanocobalamin 1000 MICROGram(s) Oral daily  darbepoetin Injectable Syringe 60 MICROGram(s) IV Push <User Schedule>  dextrose 5%. 1000 milliLiter(s) (100 mL/Hr) IV Continuous <Continuous>  dextrose 5%. 1000 milliLiter(s) (50 mL/Hr) IV Continuous <Continuous>  dextrose 50% Injectable 25 Gram(s) IV Push once  dextrose 50% Injectable 12.5 Gram(s) IV Push once  dextrose 50% Injectable 25 Gram(s) IV Push once  famotidine    Tablet 20 milliGRAM(s) Oral daily  folic acid 1 milliGRAM(s) Oral daily  furosemide    Tablet 40 milliGRAM(s) Oral daily  glucagon  Injectable 1 milliGRAM(s) IntraMuscular once  heparin   Injectable 5000 Unit(s) SubCutaneous every 8 hours  influenza   Vaccine 0.5 milliLiter(s) IntraMuscular once  insulin glargine Injectable (LANTUS) 20 Unit(s) SubCutaneous at bedtime  insulin lispro (ADMELOG) corrective regimen sliding scale   SubCutaneous three times a day before meals  insulin lispro Injectable (ADMELOG) 5 Unit(s) SubCutaneous three times a day before meals  metoprolol tartrate 12.5 milliGRAM(s) Oral every 12 hours  NIFEdipine XL 30 milliGRAM(s) Oral daily  regadenoson Injectable 0.4 milliGRAM(s) IV Push once  senna 2 Tablet(s) Oral at bedtime  sevelamer carbonate 800 milliGRAM(s) Oral three times a day with meals  tamsulosin 0.4 milliGRAM(s) Oral at bedtime        VITALS:  T(F): 97.3 (11-18-22 @ 05:02), Max: 98.5 (11-17-22 @ 21:08)  HR: 78 (11-18-22 @ 08:00)  BP: 142/67 (11-18-22 @ 08:00)  RR: 16 (11-18-22 @ 08:00)  SpO2: 100% (11-18-22 @ 08:00)  Wt(kg): --    11-16 @ 07:01  -  11-17 @ 07:00  --------------------------------------------------------  IN: 450 mL / OUT: 3000 mL / NET: -2550 mL    11-17 @ 07:01  -  11-18 @ 07:00  --------------------------------------------------------  IN: 1150 mL / OUT: 700 mL / NET: 450 mL          PHYSICAL EXAM:  Constitutional: NAD  HEENT: anicteric sclera  Neck: No JVD  Respiratory: CTAB, no wheezes, rales or rhonchi  Cardiovascular: S1, S2, RRR  Gastrointestinal: BS+, soft, NT/ND  Extremities:  LT foot swollen, dry dressing  Neurological: A/O x 3  : No CVA tenderness. No schneider.   Skin: No rashes  Vascular Access: AVF    LABS:  11-18    133<L>  |  94<L>  |  49<H>  ----------------------------<  79  4.4   |  27  |  5.6<HH>    Ca    7.9<L>      18 Nov 2022 05:57  Phos  3.6     11-18  Mg     2.1     11-18    TPro  7.1  /  Alb  3.2<L>  /  TBili  0.3  /  DBili      /  AST  7   /  ALT  <5  /  AlkPhos  169<H>  11-18                          7.2    8.34  )-----------( 320      ( 18 Nov 2022 05:57 )             22.8       Urine Studies:      RADIOLOGY & ADDITIONAL STUDIES:  < from: Xray Chest 1 View-PORTABLE IMMEDIATE (11.13.22 @ 21:56) >    No radiographic evidence of acute cardiopulmonary disease.    < end of copied text >

## 2022-11-18 NOTE — DIETITIAN INITIAL EVALUATION ADULT - OTHER CALCULATIONS
Energy: 1724-2241kcal/day (1-1.3AF -- due to obese BMI vs. HD)   Protein: 109-118g/day (1.2-1.3g/kg -- same reason as above)   Fluid: 2275mL/day (25mL/kcal due to HD)

## 2022-11-18 NOTE — DIETITIAN INITIAL EVALUATION ADULT - OTHER INFO
--63 yo male presented to the hospital for chest pain.  # ACS/CAD presenting with Chest pain  # ESRD on HD

## 2022-11-18 NOTE — DIETITIAN INITIAL EVALUATION ADULT - NSICDXPASTMEDICALHX_GEN_ALL_CORE_FT
PAST MEDICAL HISTORY:  BPH (benign prostatic hyperplasia)     CAD (coronary artery disease) 1 stent 2008    Chronic anemia     Chronic kidney disease (CKD) Stage IV    Diabetes mellitus     HLD (hyperlipidemia)     [place] (hard of hearing)     Hypertension     Myocardial infarction 2012    OA (osteoarthritis)     PAD (peripheral artery disease) S/p bypass left leg    Pain in left knee s/p fall    S/P CABG (coronary artery bypass graft) x4    Stented coronary artery in 2008    Transient ischemic attack (TIA) 2017; 2008

## 2022-11-18 NOTE — DIETITIAN INITIAL EVALUATION ADULT - NS FNS DIET ORDER
Diet, NPO (11-18-22 @ 10:46)  Diet, NPO after Midnight:      NPO Start Date: 18-Nov-2022,   NPO Start Time: 23:59  Except Medications (11-18-22 @ 02:31)

## 2022-11-18 NOTE — PROGRESS NOTE ADULT - SUBJECTIVE AND OBJECTIVE BOX
SCHWABACHER, LAWRENCE  62y Male    Patient is a 62y old  Male who presents with a chief complaint of chest pain    INTERVAL HPI/OVERNIGHT EVENTS:    ROS: Pt denies fever, chest pain, chills, SOB, palpitations, nausea, vomiting, abdominal pain, diarrhea, constipation, rash, muscle or joint pain.    Overnight events: No acute events overnight. 3 beats of NSVT noted today. Low episode of fsg in the morning.    Vital Signs Last 24 Hrs  T(C): 36.3 (18 Nov 2022 05:02), Max: 36.9 (17 Nov 2022 21:08)  T(F): 97.3 (18 Nov 2022 05:02), Max: 98.5 (17 Nov 2022 21:08)  HR: 84 (18 Nov 2022 15:30) (78 - 92)  BP: 145/74 (18 Nov 2022 15:30) (136/78 - 149/73)  BP(mean): 94 (18 Nov 2022 08:00) (94 - 94)  ABP: --  ABP(mean): --  RR: 18 (18 Nov 2022 15:30) (16 - 18)  SpO2: 100% (18 Nov 2022 08:00) (97% - 100%)    O2 Parameters below as of 18 Nov 2022 15:30  Patient On (Oxygen Delivery Method): room air      I&O's Summary    17 Nov 2022 07:01  -  18 Nov 2022 07:00  --------------------------------------------------------  IN: 1150 mL / OUT: 700 mL / NET: 450 mL    18 Nov 2022 07:01  -  18 Nov 2022 16:08  --------------------------------------------------------  IN: 0 mL / OUT: 3000 mL / NET: -3000 mL    No Known Allergies    MEDICATIONS  (STANDING):  aspirin enteric coated 81 milliGRAM(s) Oral daily  atorvastatin 40 milliGRAM(s) Oral at bedtime  cefepime   IVPB 2000 milliGRAM(s) IV Intermittent <User Schedule>  clopidogrel Tablet 75 milliGRAM(s) Oral daily  cyanocobalamin 1000 MICROGram(s) Oral daily  darbepoetin Injectable Syringe 60 MICROGram(s) IV Push <User Schedule>  dextrose 5%. 1000 milliLiter(s) (100 mL/Hr) IV Continuous <Continuous>  dextrose 5%. 1000 milliLiter(s) (50 mL/Hr) IV Continuous <Continuous>  dextrose 50% Injectable 25 Gram(s) IV Push once  dextrose 50% Injectable 12.5 Gram(s) IV Push once  dextrose 50% Injectable 25 Gram(s) IV Push once  famotidine    Tablet 20 milliGRAM(s) Oral daily  folic acid 1 milliGRAM(s) Oral daily  furosemide    Tablet 40 milliGRAM(s) Oral daily  glucagon  Injectable 1 milliGRAM(s) IntraMuscular once  heparin   Injectable 5000 Unit(s) SubCutaneous every 8 hours  influenza   Vaccine 0.5 milliLiter(s) IntraMuscular once  insulin glargine Injectable (LANTUS) 20 Unit(s) SubCutaneous at bedtime  insulin lispro (ADMELOG) corrective regimen sliding scale   SubCutaneous three times a day before meals  insulin lispro Injectable (ADMELOG) 5 Unit(s) SubCutaneous three times a day before meals  metoprolol tartrate 12.5 milliGRAM(s) Oral every 12 hours  NIFEdipine XL 30 milliGRAM(s) Oral daily  regadenoson Injectable 0.4 milliGRAM(s) IV Push once  senna 2 Tablet(s) Oral at bedtime  sevelamer carbonate 800 milliGRAM(s) Oral three times a day with meals  tamsulosin 0.4 milliGRAM(s) Oral at bedtime    MEDICATIONS  (PRN):  acetaminophen     Tablet .. 650 milliGRAM(s) Oral every 6 hours PRN Mild Pain (1 - 3)  dextrose Oral Gel 15 Gram(s) Oral once PRN Blood Glucose LESS THAN 70 milliGRAM(s)/deciliter  melatonin 3 milliGRAM(s) Oral at bedtime PRN Insomnia        PHYSICAL EXAM:  General Appearance: NAD, normal for age and gender. 	  Neck: Normal JVP, no bruit.   Eyes: Conjunctiva clear, Extra Ocular muscles intact. No scleral icterus.  ENMT: Moist oral mucosa. No oral lesion.  Cardiovascular: Regular rate and rhythm S1 S2, No JVD, No murmurs.  Respiratory: Lungs clear to auscultation. No wheezes, rales or rhonchi.  Psychiatry: Alert and oriented x 3, Mood & affect appropriate.  Gastrointestinal:  Soft, Non-tender, Non-distended.  Neurologic: Non-focal deficits.  Musculoskeletal/ extremities: Normal range of motion, No clubbing, cyanosis. Left LE swelling with venous stasis changes. No warmth. No right LE swelling.   Vascular: Left forearm AVF fistula with palpable thrill.  Skin/Integumen: No rashes, No ecchymoses, No cyanosis.    LABS:                        7.9    7.89  )-----------( 311      ( 17 Nov 2022 07:23 )             23.3     11-17    135  |  96<L>  |  36<H>  ----------------------------<  89  4.2   |  30  |  5.1<HH>    Ca    8.0<L>      17 Nov 2022 07:23  Phos  3.1     11-17  Mg     2.0     11-17    TPro  6.9  /  Alb  3.0<L>  /  TBili  0.3  /  DBili  x   /  AST  7   /  ALT  <5  /  AlkPhos  154<H>  11-17    RADIOLOGY & ADDITIONAL TESTS:    < from: NM Nuclear Stress Pharmacologic Multiple (11.18.22 @ 12:01) >  IMPRESSION:    1. NO EVIDENCE FOR ISCHEMIA DURING LEXISCAN INFUSION. FIXED PERFUSION   DEFECT INVOLVING ANTERIOR WALL OF THE LEFT VENTRICLE EXTENDING TO APEX   CONSISTENT WITH SCAR.    2. GLOBAL HYPOKINESIS WITH POOR THICKENING OF THE ANTERIOR WALL.    3. LEFT VENTRICULAR EJECTION FRACTION OF  36 % WHICH IS LOW.

## 2022-11-18 NOTE — DIETITIAN INITIAL EVALUATION ADULT - ORAL INTAKE PTA/DIET HISTORY
Pt not present in room despite multiple attempts to see him today. Will attempt to obtain nutrition hx at f/u.

## 2022-11-18 NOTE — PROGRESS NOTE ADULT - SUBJECTIVE AND OBJECTIVE BOX
INTERVAL HISTORY: No overnight events.  	  MEDICATIONS:  acetaminophen     Tablet .. 650 milliGRAM(s) Oral every 6 hours PRN  aspirin enteric coated 81 milliGRAM(s) Oral daily  atorvastatin 40 milliGRAM(s) Oral at bedtime  cefepime   IVPB 2000 milliGRAM(s) IV Intermittent <User Schedule>  clopidogrel Tablet 75 milliGRAM(s) Oral daily  cyanocobalamin 1000 MICROGram(s) Oral daily  darbepoetin Injectable Syringe 60 MICROGram(s) IV Push <User Schedule>  dextrose 5%. 1000 milliLiter(s) IV Continuous <Continuous>  dextrose 5%. 1000 milliLiter(s) IV Continuous <Continuous>  dextrose 50% Injectable 25 Gram(s) IV Push once  dextrose 50% Injectable 12.5 Gram(s) IV Push once  dextrose 50% Injectable 25 Gram(s) IV Push once  dextrose Oral Gel 15 Gram(s) Oral once PRN  famotidine    Tablet 20 milliGRAM(s) Oral daily  folic acid 1 milliGRAM(s) Oral daily  furosemide    Tablet 40 milliGRAM(s) Oral daily  glucagon  Injectable 1 milliGRAM(s) IntraMuscular once  heparin   Injectable 5000 Unit(s) SubCutaneous every 8 hours  influenza   Vaccine 0.5 milliLiter(s) IntraMuscular once  insulin glargine Injectable (LANTUS) 20 Unit(s) SubCutaneous at bedtime  insulin lispro (ADMELOG) corrective regimen sliding scale   SubCutaneous three times a day before meals  insulin lispro Injectable (ADMELOG) 5 Unit(s) SubCutaneous three times a day before meals  melatonin 3 milliGRAM(s) Oral at bedtime PRN  metoprolol tartrate 12.5 milliGRAM(s) Oral every 12 hours  NIFEdipine XL 30 milliGRAM(s) Oral daily  regadenoson Injectable 0.4 milliGRAM(s) IV Push once  senna 2 Tablet(s) Oral at bedtime  sevelamer carbonate 800 milliGRAM(s) Oral three times a day with meals  tamsulosin 0.4 milliGRAM(s) Oral at bedtime      REVIEW OF SYSTEMS:  CONSTITUTIONAL: No fever, weight loss, or fatigue  NECK: No pain or stiffness  RESPIRATORY: No cough, wheezing, chills or hemoptysis; No shortness of breath  CARDIOVASCULAR: No chest pain, palpitations, dizziness, or leg swelling  GASTROINTESTINAL: No abdominal or epigastric pain. No nausea, vomiting, or hematemesis; No diarrhea or constipation. No melena or hematochezia.  GENITOURINARY: No dysuria, frequency, hematuria, or incontinence  NEUROLOGICAL: No headaches, memory loss, loss of strength, numbness, or tremors  SKIN: No itching, burning, rashes, or lesions   ENDOCRINE: No heat or cold intolerance; No hair loss  MUSCULOSKELETAL: No joint pain or swelling; No muscle, back, or extremity pain  HEME/LYMPH: No easy bruising, or bleeding gums    PHYSICAL EXAM:  T(C): 36 (11-18-22 @ 16:09), Max: 36.9 (11-17-22 @ 21:08)  HR: 96 (11-18-22 @ 16:09) (78 - 96)  BP: 124/77 (11-18-22 @ 16:09) (124/77 - 149/73)  RR: 18 (11-18-22 @ 16:09) (16 - 18)  SpO2: 100% (11-18-22 @ 08:00) (97% - 100%)  Wt(kg): --  I&O's Summary    17 Nov 2022 07:01  -  18 Nov 2022 07:00  --------------------------------------------------------  IN: 1150 mL / OUT: 700 mL / NET: 450 mL    18 Nov 2022 07:01  -  18 Nov 2022 19:58  --------------------------------------------------------  IN: 0 mL / OUT: 3000 mL / NET: -3000 mL      Height (cm): 180.3 (11-18 @ 14:19)    GENERAL: NAD, well-groomed, well-developed  HEAD:  Atraumatic, Normocephalic  NECK: Supple, No JVD, Normal thyroid  NERVOUS SYSTEM:  Alert & Oriented X3, Good concentration  CHEST/LUNG: Clear to percussion bilaterally; No rales, rhonchi, wheezing, or rubs  HEART: Regular rate and rhythm; No murmurs, rubs, or gallops  ABDOMEN: Soft, Nontender, Nondistended; Bowel sounds present  EXTREMITIES:  2+ Peripheral Pulses, No clubbing, cyanosis, or edema  SKIN: No rashes or lesions    LABS:	 	    CARDIAC MARKERS ( 17 Nov 2022 11:52 )  x     / 0.55 ng/mL / x     / x     / x                                7.2    8.34  )-----------( 320      ( 18 Nov 2022 05:57 )             22.8     11-18    133<L>  |  94<L>  |  49<H>  ----------------------------<  79  4.4   |  27  |  5.6<HH>    Ca    7.9<L>      18 Nov 2022 05:57  Phos  3.6     11-18  Mg     2.1     11-18    TPro  7.1  /  Alb  3.2<L>  /  TBili  0.3  /  DBili  x   /  AST  7   /  ALT  <5  /  AlkPhos  169<H>  11-18    proBNP:   Lipid Profile:

## 2022-11-18 NOTE — PROGRESS NOTE ADULT - SUBJECTIVE AND OBJECTIVE BOX
LAWRENCE SCHWABACHER 62y Male  MRN#: [id number]   Hospital Day: 4d    SUBJECTIVE  Patient is a 62y old Male who presents with a chief complaint of Chest pain (18 Nov 2022 10:19)  Currently admitted to medicine with the primary diagnosis of Anemia      INTERVAL HPI AND OVERNIGHT EVENTS:  Patient was examined and seen at bedside. This morning he is resting comfortably in bed and reports no issues or overnight events.    OBJECTIVE  PAST MEDICAL & SURGICAL HISTORY  CAD (coronary artery disease)  1 stent 2008    S/P CABG (coronary artery bypass graft)  x4    Diabetes mellitus    Transient ischemic attack (TIA)  2017; 2008    Chronic kidney disease (CKD)  Stage IV    Hypertension    Stented coronary artery  in 2008    Myocardial infarction  2012    PAD (peripheral artery disease)  S/p bypass left leg    HLD (hyperlipidemia)    BPH (benign prostatic hyperplasia)    Pain in left knee  s/p fall    OA (osteoarthritis)    [place] (hard of hearing)    Chronic anemia    S/P CABG (coronary artery bypass graft)  2012    H/O arterial bypass of lower limb  Left Lower Extremity (2016)    History of surgery  Left CEA (2017)  Left Pinkie toe Amputation (2014)  CABG x 4 (2012)  Card cath - stent (2008)      AV fistula  2019  LEFT AV FISTULA      ALLERGIES:  No Known Allergies    MEDICATIONS:  STANDING MEDICATIONS  aspirin enteric coated 81 milliGRAM(s) Oral daily  atorvastatin 40 milliGRAM(s) Oral at bedtime  cefepime   IVPB 2000 milliGRAM(s) IV Intermittent <User Schedule>  clopidogrel Tablet 75 milliGRAM(s) Oral daily  cyanocobalamin 1000 MICROGram(s) Oral daily  darbepoetin Injectable Syringe 60 MICROGram(s) IV Push <User Schedule>  dextrose 5%. 1000 milliLiter(s) IV Continuous <Continuous>  dextrose 5%. 1000 milliLiter(s) IV Continuous <Continuous>  dextrose 50% Injectable 25 Gram(s) IV Push once  dextrose 50% Injectable 12.5 Gram(s) IV Push once  dextrose 50% Injectable 25 Gram(s) IV Push once  famotidine    Tablet 20 milliGRAM(s) Oral daily  folic acid 1 milliGRAM(s) Oral daily  furosemide    Tablet 40 milliGRAM(s) Oral daily  glucagon  Injectable 1 milliGRAM(s) IntraMuscular once  heparin   Injectable 5000 Unit(s) SubCutaneous every 8 hours  influenza   Vaccine 0.5 milliLiter(s) IntraMuscular once  insulin glargine Injectable (LANTUS) 20 Unit(s) SubCutaneous at bedtime  insulin lispro (ADMELOG) corrective regimen sliding scale   SubCutaneous three times a day before meals  insulin lispro Injectable (ADMELOG) 5 Unit(s) SubCutaneous three times a day before meals  metoprolol tartrate 12.5 milliGRAM(s) Oral every 12 hours  NIFEdipine XL 30 milliGRAM(s) Oral daily  regadenoson Injectable 0.4 milliGRAM(s) IV Push once  senna 2 Tablet(s) Oral at bedtime  sevelamer carbonate 800 milliGRAM(s) Oral three times a day with meals  tamsulosin 0.4 milliGRAM(s) Oral at bedtime    PRN MEDICATIONS  acetaminophen     Tablet .. 650 milliGRAM(s) Oral every 6 hours PRN  dextrose Oral Gel 15 Gram(s) Oral once PRN  melatonin 3 milliGRAM(s) Oral at bedtime PRN      VITAL SIGNS: Last 24 Hours  T(C): 36.3 (18 Nov 2022 05:02), Max: 36.9 (17 Nov 2022 21:08)  T(F): 97.3 (18 Nov 2022 05:02), Max: 98.5 (17 Nov 2022 21:08)  HR: 89 (18 Nov 2022 12:30) (78 - 92)  BP: 136/78 (18 Nov 2022 12:30) (136/78 - 149/73)  BP(mean): 94 (18 Nov 2022 08:00) (65 - 94)  RR: 18 (18 Nov 2022 12:30) (16 - 18)  SpO2: 100% (18 Nov 2022 08:00) (97% - 100%)    LABS:                        7.2    8.34  )-----------( 320      ( 18 Nov 2022 05:57 )             22.8     11-18    133<L>  |  94<L>  |  49<H>  ----------------------------<  79  4.4   |  27  |  5.6<HH>    Ca    7.9<L>      18 Nov 2022 05:57  Phos  3.6     11-18  Mg     2.1     11-18    TPro  7.1  /  Alb  3.2<L>  /  TBili  0.3  /  DBili  x   /  AST  7   /  ALT  <5  /  AlkPhos  169<H>  11-18              CARDIAC MARKERS ( 17 Nov 2022 11:52 )  x     / 0.55 ng/mL / x     / x     / x          RADIOLOGY:      PHYSICAL EXAM:  CONSTITUTIONAL: No acute distress, AAOx3  HEAD: Atraumatic, normocephalic  EYES: EOM intact, PERRLA, conjunctiva and sclera clear  ENT: moist mucous membranes  PULMONARY: Clear to auscultation bilaterally  CARDIOVASCULAR: Regular rate and rhythm  GASTROINTESTINAL: Soft, non-tender, non-distended; bowel sounds present  MUSCULOSKELETAL: no edema  NEUROLOGY: non-focal  SKIN: warm and dry

## 2022-11-18 NOTE — PROGRESS NOTE ADULT - ASSESSMENT
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 and cefepime post dialysis s.p wound Vac, chronic anemia, HFmrEF, presented to the hospital for chest pain. Found to have anemia s/p 1 unit of prbc.    ESRD on HD/ Anemia / Chest pain/ OM on abx  HD today 3 hrs 2 K bath  UF 3 L as andrew  RAMSEY weekly post HD 60 mcg  on sevelamer 800 mg po tid  BP controlled  abx for OM - Cefepime  plan for debridement with podiatry /cardiology clearance/2D echo today  Fluid restriction   will follow

## 2022-11-18 NOTE — PROGRESS NOTE ADULT - ASSESSMENT
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 and cefepime post dialysis s.p wound Vac, chronic anemia, HFmrEF, presented to the hospital for chest pain     #Chest pain  #CAD s/p PCI/CABG  #PAD s/p angioplasty  #HTN/HLD  #Chronic HFmrEF  Troponins 0.70 in setting of ESRD, 0.25 back in 09/2022  - Telemetry monitoring  - Troponin stable between 0.7-0.8  - f/u echocardiogram  - f/u cardiology, Dr. Robertson, [facility] Lexiscan for cardiology clearance- Lexiscan to be done tomorrow  - c/w ASA 81 mg daily  - c/w Plavix 75 mg daily  - c/w atorvastatin 40 mg daily  - c/w Nifedipine 30 mg daily    #NSVT  -10 beats of NSVT observed on tele on 11/16 PM  -Started Metoprolol 12.5 BID  -Maintain K>4 and Mg>2    # Anemia of chronic disease  hb on admission 6.6 baseline 7.6. s/p 1U PRBC  - Monitor CBC, transfuse to keep >8    # ESRD on HD   MWF  - HD per nephrology    #Recent hx Left calcaneal OM   - Wound Cx 9/16 - Enterobacter cloacae  - wound CX 10/16 VRE Faecim, Enterobacter cloacae complex, Proteus vulgaris, Morganella morganii   - s/p debridement 10/21 Wound Cx Proteus mirabilis, VRE faecium, Pseudomonas putida  - s/p debridement 10/24 Crumble L alcaenous   - s/p debridement 11/3 with wound vac in place  - Cefepime 2g post HD through 11/30; s/p 3 session of daptomycin post-HD  - On last admission, ID plan for 4 weeks from last debridement (11/3-11/30)  - Podiatry consulted, planning for repeat debridement, needs cardio clearance  (pending stress test)     # PAD  - s/p angiogram LLE with peroneal artery angioplasty and artherectomy 10/27  - Cont meds as above    # Type 2 DM  Continue insulin regimen    # BPH  - c/w Tamsulosin 0.4 mg daily    #Misc  -DVT prophylaxis: Heparin  -GI prophylaxis: Not indicated  -Diet: Renal, 1.2l fluid restriction  -Code status: Full code  -Activity: IAT- pending podiatry  -Dispo: pending debridement, lexiscan     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 and cefepime post dialysis s.p wound Vac, chronic anemia, HFmrEF, presented to the hospital for chest pain     # ACS/CAD presenting with Chest pain  #CAD s/p PCI/CABG  #PAD s/p angioplasty  #HTN/HLD  #Chronic HFmrEF  Troponin 0.70 in setting of ESRD, 0.25 back in 09/2022  - Troponin stable between 0.7-0.8  - TTE LVEF 45-50%, Grade 3 diastolic dysfunction  NST:  NO EVIDENCE FOR ISCHEMIA DURING LEXISCAN INFUSION. FIXED PERFUSION   DEFECT INVOLVING ANTERIOR WALL OF THE LEFT VENTRICLE EXTENDING TO APEX   CONSISTENT WITH SCAR. GLOBAL HYPOKINESIS WITH POOR THICKENING OF THE ANTERIOR WALL. LEFT VENTRICULAR EJECTION FRACTION OF  36 % WHICH IS LOW.  - c/w ASA 81 mg daily- c/w Plavix 75 mg daily- c/w atorvastatin 40 mg daily- c/w Nifedipine 30 mg daily  EKG Left anterior fascicular block ST & T wave abnormality, consider lateral ischemia. Prolonged QT      #NSVT  -10 beats of NSVT observed on tele on 11/16 PM  -Started Metoprolol 12.5 BID  -Maintain K>4 and Mg>2    # Anemia of chronic disease  - hb on admission 6.6 baseline 7.6. s/p 1U PRBC  - Monitor CBC, transfuse to keep >8    # ESRD on HD   - HD per nephrology  - B/L pitting edema, little urine output    #Recent hx Left calcaneal OM   - Wound Cx 9/16 - Enterobacter cloacae  - wound CX 10/16 VRE Faecim, Enterobacter cloacae complex, Proteus vulgaris, Morganella morganii   - s/p debridement 10/21 Wound Cx Proteus mirabilis, VRE faecium, Pseudomonas putida  - s/p debridement 10/24 Crumble L alcaenous   - s/p debridement 11/3 with wound vac in place  - Cefepime 2g post HD through 11/30; s/p 3 session of daptomycin post-HD  - On last admission, ID plan for 4 weeks from last debridement (11/3-11/30)    # PAD  - s/p angiogram LLE with peroneal artery angioplasty and arterectomy 10/27  - Cont meds as above    # Type 2 DM  Continue insulin regimen    # BPH  - c/w Tamsulosin 0.4 mg daily    #Misc  -DVT prophylaxis: Heparin  -GI prophylaxis: Not indicated  -Diet: Renal, 1.2l fluid restriction  -Code status: Full code  -Activity: IAT- pending podiatry  -Dispo: pending debridement,

## 2022-11-18 NOTE — DIETITIAN INITIAL EVALUATION ADULT - PERTINENT MEDS FT
MEDICATIONS  (STANDING):  aspirin enteric coated 81 milliGRAM(s) Oral daily  atorvastatin 40 milliGRAM(s) Oral at bedtime  cefepime   IVPB 2000 milliGRAM(s) IV Intermittent <User Schedule>  clopidogrel Tablet 75 milliGRAM(s) Oral daily  cyanocobalamin 1000 MICROGram(s) Oral daily  darbepoetin Injectable Syringe 60 MICROGram(s) IV Push <User Schedule>  dextrose 5%. 1000 milliLiter(s) (100 mL/Hr) IV Continuous <Continuous>  dextrose 5%. 1000 milliLiter(s) (50 mL/Hr) IV Continuous <Continuous>  dextrose 50% Injectable 25 Gram(s) IV Push once  dextrose 50% Injectable 12.5 Gram(s) IV Push once  dextrose 50% Injectable 25 Gram(s) IV Push once  famotidine    Tablet 20 milliGRAM(s) Oral daily  folic acid 1 milliGRAM(s) Oral daily  furosemide    Tablet 40 milliGRAM(s) Oral daily  glucagon  Injectable 1 milliGRAM(s) IntraMuscular once  heparin   Injectable 5000 Unit(s) SubCutaneous every 8 hours  insulin glargine Injectable (LANTUS) 20 Unit(s) SubCutaneous at bedtime  insulin lispro (ADMELOG) corrective regimen sliding scale   SubCutaneous three times a day before meals  insulin lispro Injectable (ADMELOG) 5 Unit(s) SubCutaneous three times a day before meals  metoprolol tartrate 12.5 milliGRAM(s) Oral every 12 hours  NIFEdipine XL 30 milliGRAM(s) Oral daily  regadenoson Injectable 0.4 milliGRAM(s) IV Push once  senna 2 Tablet(s) Oral at bedtime  sevelamer carbonate 800 milliGRAM(s) Oral three times a day with meals  tamsulosin 0.4 milliGRAM(s) Oral at bedtime    MEDICATIONS  (PRN):  dextrose Oral Gel 15 Gram(s) Oral once PRN Blood Glucose LESS THAN 70 milliGRAM(s)/deciliter

## 2022-11-19 LAB
ALBUMIN SERPL ELPH-MCNC: 3.2 G/DL — LOW (ref 3.5–5.2)
ALP SERPL-CCNC: 143 U/L — HIGH (ref 30–115)
ALT FLD-CCNC: <5 U/L — SIGNIFICANT CHANGE UP (ref 0–41)
ANION GAP SERPL CALC-SCNC: 16 MMOL/L — HIGH (ref 7–14)
AST SERPL-CCNC: 8 U/L — SIGNIFICANT CHANGE UP (ref 0–41)
BASOPHILS # BLD AUTO: 0.08 K/UL — SIGNIFICANT CHANGE UP (ref 0–0.2)
BASOPHILS NFR BLD AUTO: 1.1 % — HIGH (ref 0–1)
BILIRUB SERPL-MCNC: 0.3 MG/DL — SIGNIFICANT CHANGE UP (ref 0.2–1.2)
BUN SERPL-MCNC: 32 MG/DL — HIGH (ref 10–20)
CALCIUM SERPL-MCNC: 7.9 MG/DL — LOW (ref 8.4–10.5)
CHLORIDE SERPL-SCNC: 94 MMOL/L — LOW (ref 98–110)
CO2 SERPL-SCNC: 25 MMOL/L — SIGNIFICANT CHANGE UP (ref 17–32)
CREAT SERPL-MCNC: 4.2 MG/DL — CRITICAL HIGH (ref 0.7–1.5)
EGFR: 15 ML/MIN/1.73M2 — LOW
EOSINOPHIL # BLD AUTO: 0.18 K/UL — SIGNIFICANT CHANGE UP (ref 0–0.7)
EOSINOPHIL NFR BLD AUTO: 2.4 % — SIGNIFICANT CHANGE UP (ref 0–8)
GLUCOSE BLDC GLUCOMTR-MCNC: 125 MG/DL — HIGH (ref 70–99)
GLUCOSE BLDC GLUCOMTR-MCNC: 126 MG/DL — HIGH (ref 70–99)
GLUCOSE BLDC GLUCOMTR-MCNC: 141 MG/DL — HIGH (ref 70–99)
GLUCOSE BLDC GLUCOMTR-MCNC: 155 MG/DL — HIGH (ref 70–99)
GLUCOSE BLDC GLUCOMTR-MCNC: 155 MG/DL — HIGH (ref 70–99)
GLUCOSE BLDC GLUCOMTR-MCNC: 55 MG/DL — LOW (ref 70–99)
GLUCOSE SERPL-MCNC: 137 MG/DL — HIGH (ref 70–99)
HCT VFR BLD CALC: 24.6 % — LOW (ref 42–52)
HGB BLD-MCNC: 8.1 G/DL — LOW (ref 14–18)
IMM GRANULOCYTES NFR BLD AUTO: 0.3 % — SIGNIFICANT CHANGE UP (ref 0.1–0.3)
LYMPHOCYTES # BLD AUTO: 0.72 K/UL — LOW (ref 1.2–3.4)
LYMPHOCYTES # BLD AUTO: 9.6 % — LOW (ref 20.5–51.1)
MAGNESIUM SERPL-MCNC: 2.1 MG/DL — SIGNIFICANT CHANGE UP (ref 1.8–2.4)
MCHC RBC-ENTMCNC: 30.8 PG — SIGNIFICANT CHANGE UP (ref 27–31)
MCHC RBC-ENTMCNC: 32.9 G/DL — SIGNIFICANT CHANGE UP (ref 32–37)
MCV RBC AUTO: 93.5 FL — SIGNIFICANT CHANGE UP (ref 80–94)
MONOCYTES # BLD AUTO: 0.68 K/UL — HIGH (ref 0.1–0.6)
MONOCYTES NFR BLD AUTO: 9 % — SIGNIFICANT CHANGE UP (ref 1.7–9.3)
NEUTROPHILS # BLD AUTO: 5.85 K/UL — SIGNIFICANT CHANGE UP (ref 1.4–6.5)
NEUTROPHILS NFR BLD AUTO: 77.6 % — HIGH (ref 42.2–75.2)
NRBC # BLD: 0 /100 WBCS — SIGNIFICANT CHANGE UP (ref 0–0)
PLATELET # BLD AUTO: 290 K/UL — SIGNIFICANT CHANGE UP (ref 130–400)
POTASSIUM SERPL-MCNC: 4.2 MMOL/L — SIGNIFICANT CHANGE UP (ref 3.5–5)
POTASSIUM SERPL-SCNC: 4.2 MMOL/L — SIGNIFICANT CHANGE UP (ref 3.5–5)
PROT SERPL-MCNC: 6.8 G/DL — SIGNIFICANT CHANGE UP (ref 6–8)
RBC # BLD: 2.63 M/UL — LOW (ref 4.7–6.1)
RBC # FLD: 14.8 % — HIGH (ref 11.5–14.5)
SODIUM SERPL-SCNC: 135 MMOL/L — SIGNIFICANT CHANGE UP (ref 135–146)
WBC # BLD: 7.53 K/UL — SIGNIFICANT CHANGE UP (ref 4.8–10.8)
WBC # FLD AUTO: 7.53 K/UL — SIGNIFICANT CHANGE UP (ref 4.8–10.8)

## 2022-11-19 PROCEDURE — 99232 SBSQ HOSP IP/OBS MODERATE 35: CPT

## 2022-11-19 RX ORDER — DEXTROSE 50 % IN WATER 50 %
25 SYRINGE (ML) INTRAVENOUS ONCE
Refills: 0 | Status: COMPLETED | OUTPATIENT
Start: 2022-11-19 | End: 2022-11-19

## 2022-11-19 RX ORDER — INSULIN LISPRO 100/ML
VIAL (ML) SUBCUTANEOUS
Refills: 0 | Status: DISCONTINUED | OUTPATIENT
Start: 2022-11-19 | End: 2022-11-22

## 2022-11-19 RX ORDER — INSULIN LISPRO 100/ML
5 VIAL (ML) SUBCUTANEOUS
Refills: 0 | Status: DISCONTINUED | OUTPATIENT
Start: 2022-11-20 | End: 2022-11-22

## 2022-11-19 RX ORDER — INSULIN GLARGINE 100 [IU]/ML
20 INJECTION, SOLUTION SUBCUTANEOUS AT BEDTIME
Refills: 0 | Status: DISCONTINUED | OUTPATIENT
Start: 2022-11-19 | End: 2022-11-22

## 2022-11-19 RX ORDER — CEFEPIME 1 G/1
2000 INJECTION, POWDER, FOR SOLUTION INTRAMUSCULAR; INTRAVENOUS
Refills: 0 | Status: DISCONTINUED | OUTPATIENT
Start: 2022-11-23 | End: 2022-12-08

## 2022-11-19 RX ADMIN — Medication 30 MILLIGRAM(S): at 05:25

## 2022-11-19 RX ADMIN — Medication 40 MILLIGRAM(S): at 05:25

## 2022-11-19 RX ADMIN — SEVELAMER CARBONATE 800 MILLIGRAM(S): 2400 POWDER, FOR SUSPENSION ORAL at 17:49

## 2022-11-19 RX ADMIN — HEPARIN SODIUM 5000 UNIT(S): 5000 INJECTION INTRAVENOUS; SUBCUTANEOUS at 14:50

## 2022-11-19 RX ADMIN — Medication 12.5 MILLIGRAM(S): at 17:49

## 2022-11-19 RX ADMIN — FAMOTIDINE 20 MILLIGRAM(S): 10 INJECTION INTRAVENOUS at 12:22

## 2022-11-19 RX ADMIN — ATORVASTATIN CALCIUM 40 MILLIGRAM(S): 80 TABLET, FILM COATED ORAL at 22:56

## 2022-11-19 RX ADMIN — TAMSULOSIN HYDROCHLORIDE 0.4 MILLIGRAM(S): 0.4 CAPSULE ORAL at 22:57

## 2022-11-19 RX ADMIN — SENNA PLUS 2 TABLET(S): 8.6 TABLET ORAL at 22:56

## 2022-11-19 RX ADMIN — Medication 5 UNIT(S): at 08:35

## 2022-11-19 RX ADMIN — PREGABALIN 1000 MICROGRAM(S): 225 CAPSULE ORAL at 12:21

## 2022-11-19 RX ADMIN — HEPARIN SODIUM 5000 UNIT(S): 5000 INJECTION INTRAVENOUS; SUBCUTANEOUS at 05:25

## 2022-11-19 RX ADMIN — HEPARIN SODIUM 5000 UNIT(S): 5000 INJECTION INTRAVENOUS; SUBCUTANEOUS at 22:56

## 2022-11-19 RX ADMIN — Medication 1 MILLIGRAM(S): at 12:22

## 2022-11-19 RX ADMIN — Medication 12.5 MILLIGRAM(S): at 05:25

## 2022-11-19 RX ADMIN — CLOPIDOGREL BISULFATE 75 MILLIGRAM(S): 75 TABLET, FILM COATED ORAL at 12:21

## 2022-11-19 RX ADMIN — Medication 25 MILLILITER(S): at 04:51

## 2022-11-19 RX ADMIN — Medication 81 MILLIGRAM(S): at 12:22

## 2022-11-19 RX ADMIN — INSULIN GLARGINE 20 UNIT(S): 100 INJECTION, SOLUTION SUBCUTANEOUS at 22:57

## 2022-11-19 RX ADMIN — Medication 5 UNIT(S): at 12:23

## 2022-11-19 NOTE — PROGRESS NOTE ADULT - SUBJECTIVE AND OBJECTIVE BOX
LAWRENCE SCHWABACHER 62y Male  MRN#: [id number]   Hospital Day: 5d    SUBJECTIVE  Patient is a 62y old Male who presents with a chief complaint of Chest pain (19 Nov 2022 08:33)  Currently admitted to medicine with the primary diagnosis of Anemia      INTERVAL HPI AND OVERNIGHT EVENTS:  Patient was examined and seen at bedside. This morning he is resting comfortably in bed and reports no issues or overnight events.    OBJECTIVE  PAST MEDICAL & SURGICAL HISTORY  CAD (coronary artery disease)  1 stent 2008    S/P CABG (coronary artery bypass graft)  x4    Diabetes mellitus    Transient ischemic attack (TIA)  2017; 2008    Chronic kidney disease (CKD)  Stage IV    Hypertension    Stented coronary artery  in 2008    Myocardial infarction  2012    PAD (peripheral artery disease)  S/p bypass left leg    HLD (hyperlipidemia)    BPH (benign prostatic hyperplasia)    Pain in left knee  s/p fall    OA (osteoarthritis)    [place] (hard of hearing)    Chronic anemia    S/P CABG (coronary artery bypass graft)  2012    H/O arterial bypass of lower limb  Left Lower Extremity (2016)    History of surgery  Left CEA (2017)  Left Pinkie toe Amputation (2014)  CABG x 4 (2012)  Card cath - stent (2008)      AV fistula  2019  LEFT AV FISTULA      ALLERGIES:  No Known Allergies    MEDICATIONS:  STANDING MEDICATIONS  aspirin enteric coated 81 milliGRAM(s) Oral daily  atorvastatin 40 milliGRAM(s) Oral at bedtime  cefepime   IVPB 2000 milliGRAM(s) IV Intermittent <User Schedule>  clopidogrel Tablet 75 milliGRAM(s) Oral daily  cyanocobalamin 1000 MICROGram(s) Oral daily  darbepoetin Injectable Syringe 60 MICROGram(s) IV Push <User Schedule>  dextrose 5%. 1000 milliLiter(s) IV Continuous <Continuous>  dextrose 5%. 1000 milliLiter(s) IV Continuous <Continuous>  dextrose 50% Injectable 25 Gram(s) IV Push once  dextrose 50% Injectable 12.5 Gram(s) IV Push once  dextrose 50% Injectable 25 Gram(s) IV Push once  famotidine    Tablet 20 milliGRAM(s) Oral daily  folic acid 1 milliGRAM(s) Oral daily  furosemide    Tablet 40 milliGRAM(s) Oral daily  glucagon  Injectable 1 milliGRAM(s) IntraMuscular once  heparin   Injectable 5000 Unit(s) SubCutaneous every 8 hours  influenza   Vaccine 0.5 milliLiter(s) IntraMuscular once  insulin glargine Injectable (LANTUS) 20 Unit(s) SubCutaneous at bedtime  insulin lispro (ADMELOG) corrective regimen sliding scale   SubCutaneous three times a day before meals  insulin lispro Injectable (ADMELOG) 5 Unit(s) SubCutaneous three times a day before meals  metoprolol tartrate 12.5 milliGRAM(s) Oral every 12 hours  NIFEdipine XL 30 milliGRAM(s) Oral daily  regadenoson Injectable 0.4 milliGRAM(s) IV Push once  senna 2 Tablet(s) Oral at bedtime  sevelamer carbonate 800 milliGRAM(s) Oral three times a day with meals  tamsulosin 0.4 milliGRAM(s) Oral at bedtime    PRN MEDICATIONS  acetaminophen     Tablet .. 650 milliGRAM(s) Oral every 6 hours PRN  dextrose Oral Gel 15 Gram(s) Oral once PRN  melatonin 3 milliGRAM(s) Oral at bedtime PRN      VITAL SIGNS: Last 24 Hours  T(C): 36.3 (19 Nov 2022 06:45), Max: 36.3 (19 Nov 2022 06:45)  T(F): 97.3 (19 Nov 2022 06:45), Max: 97.3 (19 Nov 2022 06:45)  HR: 89 (19 Nov 2022 06:45) (80 - 96)  BP: 134/93 (19 Nov 2022 06:45) (119/57 - 145/74)  BP(mean): 97 (18 Nov 2022 16:09) (97 - 97)  RR: 18 (19 Nov 2022 06:45) (18 - 18)  SpO2: 97% (18 Nov 2022 22:56) (97% - 97%)    LABS:                        8.1    7.53  )-----------( 290      ( 19 Nov 2022 06:50 )             24.6     11-19    135  |  94<L>  |  32<H>  ----------------------------<  137<H>  4.2   |  25  |  4.2<HH>    Ca    7.9<L>      19 Nov 2022 06:50  Phos  3.6     11-18  Mg     2.1     11-19    TPro  6.8  /  Alb  3.2<L>  /  TBili  0.3  /  DBili  x   /  AST  8   /  ALT  <5  /  AlkPhos  143<H>  11-19              CARDIAC MARKERS ( 17 Nov 2022 11:52 )  x     / 0.55 ng/mL / x     / x     / x          RADIOLOGY:      PHYSICAL EXAM:  CONSTITUTIONAL: No acute distress, AAOx3  HEAD: Atraumatic, normocephalic  EYES: EOM intact, PERRLA, conjunctiva and sclera clear  ENT: moist mucous membranes  PULMONARY: Clear to auscultation bilaterally  CARDIOVASCULAR: Regular rate and rhythm  GASTROINTESTINAL: Soft, non-tender, non-distended; bowel sounds present  MUSCULOSKELETAL: no edema  NEUROLOGY: non-focal  SKIN: warm and dry

## 2022-11-19 NOTE — PROGRESS NOTE ADULT - SUBJECTIVE AND OBJECTIVE BOX
seen and examined  no distress   lying comfortable         PAST HISTORY  --------------------------------------------------------------------------------  No significant changes to PMH, PSH, FHx, SHx, unless otherwise noted    ALLERGIES & MEDICATIONS  --------------------------------------------------------------------------------  Allergies    No Known Allergies    Intolerances      Standing Inpatient Medications  aspirin enteric coated 81 milliGRAM(s) Oral daily  atorvastatin 40 milliGRAM(s) Oral at bedtime  cefepime   IVPB 2000 milliGRAM(s) IV Intermittent <User Schedule>  clopidogrel Tablet 75 milliGRAM(s) Oral daily  cyanocobalamin 1000 MICROGram(s) Oral daily  darbepoetin Injectable Syringe 60 MICROGram(s) IV Push <User Schedule>  dextrose 5%. 1000 milliLiter(s) IV Continuous <Continuous>  dextrose 5%. 1000 milliLiter(s) IV Continuous <Continuous>  dextrose 50% Injectable 25 Gram(s) IV Push once  dextrose 50% Injectable 12.5 Gram(s) IV Push once  dextrose 50% Injectable 25 Gram(s) IV Push once  famotidine    Tablet 20 milliGRAM(s) Oral daily  folic acid 1 milliGRAM(s) Oral daily  furosemide    Tablet 40 milliGRAM(s) Oral daily  glucagon  Injectable 1 milliGRAM(s) IntraMuscular once  heparin   Injectable 5000 Unit(s) SubCutaneous every 8 hours  influenza   Vaccine 0.5 milliLiter(s) IntraMuscular once  insulin glargine Injectable (LANTUS) 20 Unit(s) SubCutaneous at bedtime  insulin lispro (ADMELOG) corrective regimen sliding scale   SubCutaneous three times a day before meals  insulin lispro Injectable (ADMELOG) 5 Unit(s) SubCutaneous three times a day before meals  metoprolol tartrate 12.5 milliGRAM(s) Oral every 12 hours  NIFEdipine XL 30 milliGRAM(s) Oral daily  regadenoson Injectable 0.4 milliGRAM(s) IV Push once  senna 2 Tablet(s) Oral at bedtime  sevelamer carbonate 800 milliGRAM(s) Oral three times a day with meals  tamsulosin 0.4 milliGRAM(s) Oral at bedtime    PRN Inpatient Medications  acetaminophen     Tablet .. 650 milliGRAM(s) Oral every 6 hours PRN  dextrose Oral Gel 15 Gram(s) Oral once PRN  melatonin 3 milliGRAM(s) Oral at bedtime PRN        VITALS/PHYSICAL EXAM  --------------------------------------------------------------------------------  T(C): 36.3 (11-19-22 @ 06:45), Max: 36.3 (11-19-22 @ 06:45)  HR: 89 (11-19-22 @ 06:45) (80 - 96)  BP: 134/93 (11-19-22 @ 06:45) (119/57 - 145/74)  RR: 18 (11-19-22 @ 06:45) (18 - 18)  SpO2: 97% (11-18-22 @ 22:56) (97% - 97%)  Wt(kg): --  Height (cm): 180.3 (11-18-22 @ 14:19)      11-18-22 @ 07:01  -  11-19-22 @ 07:00  --------------------------------------------------------  IN: 300 mL / OUT: 3000 mL / NET: -2700 mL      Physical Exam:  	Gen: NAD  	Pulm: CTA B/L  	CV:  S1S2; no rub  	Abd: soft, nontender/nondistended  	LE: dressing   	Vascular access:av     LABS/STUDIES  --------------------------------------------------------------------------------              8.1    7.53  >-----------<  290      [11-19-22 @ 06:50]              24.6     135  |  94  |  32  ----------------------------<  137      [11-19-22 @ 06:50]  4.2   |  25  |  4.2        Ca     7.9     [11-19-22 @ 06:50]      Mg     2.1     [11-19-22 @ 06:50]      Phos  3.6     [11-18-22 @ 05:57]    TPro  6.8  /  Alb  3.2  /  TBili  0.3  /  DBili  x   /  AST  8   /  ALT  <5  /  AlkPhos  143  [11-19-22 @ 06:50]        Creatinine Trend:  SCr 4.2 [11-19 @ 06:50]  SCr 5.6 [11-18 @ 05:57]  SCr 5.1 [11-17 @ 07:23]  SCr 4.9 [11-15 @ 06:57]  SCr 6.3 [11-14 @ 11:33]    Urinalysis - [10-23-22 @ 18:53]      Color Yellow / Appearance Slightly Turbid / SG 1.011 / pH 8.5      Gluc 200 mg/dL / Ketone Negative  / Bili Negative / Urobili <2 mg/dL       Blood Trace / Protein 100 mg/dL / Leuk Est Small / Nitrite Negative      RBC 6 / WBC 13 / Hyaline 2 / Gran  / Sq Epi  / Non Sq Epi 9 / Bacteria Negative      Iron 39, TIBC 133, %sat 29      [10-21-22 @ 10:30]  Ferritin 1126      [10-21-22 @ 10:30]  PTH -- (Ca 7.5)      [02-26-22 @ 16:00]   312  Vitamin D (25OH) 21      [02-26-22 @ 16:00]  HbA1c 5.8      [01-30-20 @ 06:46]  Lipid: chol 81, , HDL 22, LDL --      [10-15-22 @ 09:53]    HBsAb Nonreact      [11-03-22 @ 06:40]  HBsAg Nonreact      [11-03-22 @ 06:40]  HBcAb Nonreact      [11-03-22 @ 06:40]  HCV 0.14, Nonreact      [10-27-22 @ 04:09]

## 2022-11-19 NOTE — PROGRESS NOTE ADULT - ASSESSMENT
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 and cefepime post dialysis s.p wound Vac, chronic anemia, HFmrEF, presented to the hospital for chest pain. Found to have anemia s/p 1 unit of prbc.    ESRD on HD/ Anemia / Chest pain/ OM on abx  HD ton monday   RAMSEY weekly post HD 60 mcg  on sevelamer 800 mg po tid/ ph at goal   BP controlled  abx for OM - Cefepime  plan for debridement with podiatry /cardiology f/up noted   Fluid restriction   will follow

## 2022-11-19 NOTE — PROGRESS NOTE ADULT - ASSESSMENT
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 and cefepime post dialysis s.p wound Vac, chronic anemia, HFmrEF, presented to the hospital for chest pain     # Chest pain, no further episode  # CAD s/p PCI, CABG  # HTN, stable  # HLD  # Chronic HFmrEF, resolved  - stable downtrending troponin  - c/w telemetry  - Lexiscan without new ischemia, noted chronic old scar, continue medical therapy as per Cardiology, may proceed with debridement for left foot ulcer  - c/w aspirin, plavix, lipitor 40 mg, nifedipine 30 mg daily    # Anemia of chronic disease  - hb on admission 6.6 baseline 7.6. s/p 1U PRBC  - monitor CBC, transfuse to keep >8    # ESRD on HD MWF  - HD per nephrology    # Left foot ulcer  # Recent hx Left calcaneal OM   - wound Cx 9/16 Enterobacter cloacae  - wound CX 10/16 VRE Faecim, Enterobacter cloacae complex, Proteus vulgaris, Morganella morganii   - s/p debridement 10/21 Wound Cx Proteus mirabilis, VRE faecium, Pseudomonas putida  - s/p debridement 10/24 Crumble L alcaenous   - s/p debridement 11/3 with wound vac in place  - cefepime 2g post HD through 11/30; s/p 3 session of daptomycin post-HD  - on last admission, ID plan for 4 weeks from last debridement (11/3-11/30)  - Podiatry consulted, planning for repeat debridement possibly Monday afternoon, keep NPO after midnight on Sunday    # PAD  - s/p angiogram LLE with peroneal artery angioplasty and arthrectomy 10/27  - c/w aspirin, plavix and statin    # Type 2 DM, fsg still low in the morning despite decreasing insulin regimen  - monitor fsg glucose, c/w current insulin regimen except will hold bolus insulin for night and keep sliding scale    # BPH  - c/w tamsulosin 0.4 mg daily    # DVT prophylaxis - on heparin subcut  # Code status - Full Code  Dispo: pending debridement of left foot ulcer

## 2022-11-19 NOTE — PROGRESS NOTE ADULT - ASSESSMENT
63 yo male, h/o type 2 DM on insulin, CAD s.p stent 2008, s/p CABG 2012 (Dr Smith), PAD s/p angioplasty and stent b/l LE, BPH, ESRD on HD through left AVF (MWF follows with Dr MARY Paula), DFU sp left toe amputation, left calcaneal OM on daptomycin and cefepime post dialysis s.p wound Vac, chronic anemia, HFmrEF, presented to the hospital for chest pain     # ACS/CAD presenting with Chest pain  #CAD s/p PCI/CABG  #PAD s/p angioplasty  #HTN/HLD  #Chronic HFmrEF  Troponin 0.70 in setting of ESRD, 0.25 back in 09/2022  - Troponin stable between 0.7-0.8  - TTE LVEF 45-50%, Grade 3 diastolic dysfunction  NST:  NO EVIDENCE FOR ISCHEMIA DURING LEXISCAN INFUSION. FIXED PERFUSION   DEFECT INVOLVING ANTERIOR WALL OF THE LEFT VENTRICLE EXTENDING TO APEX   CONSISTENT WITH SCAR. GLOBAL HYPOKINESIS WITH POOR THICKENING OF THE ANTERIOR WALL. LEFT VENTRICULAR EJECTION FRACTION OF  36 % WHICH IS LOW.  - c/w ASA 81 mg daily- c/w Plavix 75 mg daily- c/w atorvastatin 40 mg daily- c/w Nifedipine 30 mg daily  EKG: Left anterior fascicular block ST & T wave abnormality, consider lateral ischemia. Prolonged QT      #NSVT  -10 beats of NSVT observed on tele on 11/16 PM  -Started Metoprolol 12.5 BID  -Maintain K>4 and Mg>2    # Anemia of chronic disease  - hb on admission 6.6 baseline 7.6. s/p 1U PRBC  - Monitor CBC, transfuse to keep >8    # ESRD on HD   - HD per nephrology  - B/L pitting edema, little urine output    #Recent hx Left calcaneal OM   - Wound Cx 9/16 - Enterobacter cloacae  - wound CX 10/16 VRE Faecium Enterobacter cloacae complex, Proteus vulgaris, Morganella morganii   - s/p debridement 10/21 Wound Cx Proteus mirabilis, VRE faecium, Pseudomonas putida  - s/p debridement 10/24 Crumble L calcaneous   - s/p debridement 11/3 with wound vac in place  - Cefepime 2g post HD through 11/30; s/p 3 session of daptomycin post-HD  - On last admission, ID plan for 4 weeks from last debridement (11/3-11/30)    # PAD  - s/p angiogram LLE with peroneal artery angioplasty and arterectomy 10/27  - Cont meds as above    # Type 2 DM  Continue insulin regimen    # BPH  - c/w Tamsulosin 0.4 mg daily    Pending: Podiatry procedure  -DVT prophylaxis: Heparin  -GI prophylaxis: Not indicated  -Diet: Renal, 1.2l fluid restriction  -Code status: Full code

## 2022-11-19 NOTE — PROGRESS NOTE ADULT - SUBJECTIVE AND OBJECTIVE BOX
SCHWABACHER, LAWRENCE  62y Male    Patient is a 62y old  Male who presents with a chief complaint of chest pain    INTERVAL HPI/OVERNIGHT EVENTS:    ROS: Pt denies fever, chest pain, chills, SOB, palpitations, nausea, vomiting, abdominal pain, diarrhea, constipation, rash, muscle or joint pain.    Overnight events: No acute events overnight. Still noted with low fsg in the morning.    Vital Signs Last 24 Hrs  T(C): 36.3 (19 Nov 2022 06:45), Max: 36.3 (19 Nov 2022 06:45)  T(F): 97.3 (19 Nov 2022 06:45), Max: 97.3 (19 Nov 2022 06:45)  HR: 89 (19 Nov 2022 06:45) (80 - 96)  BP: 134/93 (19 Nov 2022 06:45) (119/57 - 145/74)  BP(mean): 97 (18 Nov 2022 16:09) (97 - 97)  ABP: --  ABP(mean): --  RR: 18 (19 Nov 2022 06:45) (18 - 18)  SpO2: 97% (18 Nov 2022 22:56) (97% - 97%)    O2 Parameters below as of 18 Nov 2022 22:56  Patient On (Oxygen Delivery Method): room air      I&O's Summary    18 Nov 2022 07:01  -  19 Nov 2022 07:00  --------------------------------------------------------  IN: 300 mL / OUT: 3000 mL / NET: -2700 mL    19 Nov 2022 07:01  -  19 Nov 2022 14:44  --------------------------------------------------------  IN: 384 mL / OUT: 0 mL / NET: 384 mL    No Known Allergies    MEDICATIONS  (STANDING):  aspirin enteric coated 81 milliGRAM(s) Oral daily  atorvastatin 40 milliGRAM(s) Oral at bedtime  cefepime   IVPB 2000 milliGRAM(s) IV Intermittent <User Schedule>  clopidogrel Tablet 75 milliGRAM(s) Oral daily  cyanocobalamin 1000 MICROGram(s) Oral daily  darbepoetin Injectable Syringe 60 MICROGram(s) IV Push <User Schedule>  dextrose 5%. 1000 milliLiter(s) (100 mL/Hr) IV Continuous <Continuous>  dextrose 5%. 1000 milliLiter(s) (50 mL/Hr) IV Continuous <Continuous>  dextrose 50% Injectable 25 Gram(s) IV Push once  dextrose 50% Injectable 12.5 Gram(s) IV Push once  dextrose 50% Injectable 25 Gram(s) IV Push once  famotidine    Tablet 20 milliGRAM(s) Oral daily  folic acid 1 milliGRAM(s) Oral daily  furosemide    Tablet 40 milliGRAM(s) Oral daily  glucagon  Injectable 1 milliGRAM(s) IntraMuscular once  heparin   Injectable 5000 Unit(s) SubCutaneous every 8 hours  influenza   Vaccine 0.5 milliLiter(s) IntraMuscular once  insulin glargine Injectable (LANTUS) 20 Unit(s) SubCutaneous at bedtime  insulin lispro (ADMELOG) corrective regimen sliding scale   SubCutaneous three times a day before meals  insulin lispro Injectable (ADMELOG) 5 Unit(s) SubCutaneous three times a day before meals  metoprolol tartrate 12.5 milliGRAM(s) Oral every 12 hours  NIFEdipine XL 30 milliGRAM(s) Oral daily  regadenoson Injectable 0.4 milliGRAM(s) IV Push once  senna 2 Tablet(s) Oral at bedtime  sevelamer carbonate 800 milliGRAM(s) Oral three times a day with meals  tamsulosin 0.4 milliGRAM(s) Oral at bedtime    MEDICATIONS  (PRN):  acetaminophen     Tablet .. 650 milliGRAM(s) Oral every 6 hours PRN Mild Pain (1 - 3)  dextrose Oral Gel 15 Gram(s) Oral once PRN Blood Glucose LESS THAN 70 milliGRAM(s)/deciliter  melatonin 3 milliGRAM(s) Oral at bedtime PRN Insomnia        PHYSICAL EXAM:  General Appearance: NAD, normal for age and gender. 	  Neck: Normal JVP, no bruit.   Eyes: Conjunctiva clear, Extra Ocular muscles intact. No scleral icterus.  ENMT: Moist oral mucosa. No oral lesion.  Cardiovascular: Regular rate and rhythm S1 S2, No JVD, No murmurs.  Respiratory: Lungs clear to auscultation. No wheezes, rales or rhonchi.  Psychiatry: Alert and oriented x 3, Mood & affect appropriate.  Gastrointestinal:  Soft, Non-tender, Non-distended.  Neurologic: Non-focal deficits.  Musculoskeletal/ extremities: Normal range of motion, No clubbing, cyanosis. Left LE swelling with venous stasis changes. No warmth. No right LE swelling.   Vascular: Left forearm AVF fistula with palpable thrill.  Skin/Integumen: No rashes, No ecchymoses, No cyanosis.    LABS:                        8.1    7.53  )-----------( 290      ( 19 Nov 2022 06:50 )             24.6     11-19    135  |  94<L>  |  32<H>  ----------------------------<  137<H>  4.2   |  25  |  4.2<HH>    Ca    7.9<L>      19 Nov 2022 06:50  Phos  3.6     11-18  Mg     2.1     11-19    TPro  6.8  /  Alb  3.2<L>  /  TBili  0.3  /  DBili  x   /  AST  8   /  ALT  <5  /  AlkPhos  143<H>  11-19    RADIOLOGY & ADDITIONAL TESTS:    < from: NM Nuclear Stress Pharmacologic Multiple (11.18.22 @ 12:01) >  IMPRESSION:    1. NO EVIDENCE FOR ISCHEMIA DURING LEXISCAN INFUSION. FIXED PERFUSION   DEFECT INVOLVING ANTERIOR WALL OF THE LEFT VENTRICLE EXTENDING TO APEX   CONSISTENT WITH SCAR.    2. GLOBAL HYPOKINESIS WITH POOR THICKENING OF THE ANTERIOR WALL.    3. LEFT VENTRICULAR EJECTION FRACTION OF  36 % WHICH IS LOW.

## 2022-11-20 LAB
ALBUMIN SERPL ELPH-MCNC: 3.1 G/DL — LOW (ref 3.5–5.2)
ALP SERPL-CCNC: 181 U/L — HIGH (ref 30–115)
ALT FLD-CCNC: <5 U/L — SIGNIFICANT CHANGE UP (ref 0–41)
ANION GAP SERPL CALC-SCNC: 13 MMOL/L — SIGNIFICANT CHANGE UP (ref 7–14)
AST SERPL-CCNC: 7 U/L — SIGNIFICANT CHANGE UP (ref 0–41)
BASOPHILS # BLD AUTO: 0.09 K/UL — SIGNIFICANT CHANGE UP (ref 0–0.2)
BASOPHILS NFR BLD AUTO: 1.1 % — HIGH (ref 0–1)
BILIRUB SERPL-MCNC: 0.3 MG/DL — SIGNIFICANT CHANGE UP (ref 0.2–1.2)
BUN SERPL-MCNC: 43 MG/DL — HIGH (ref 10–20)
CALCIUM SERPL-MCNC: 8.3 MG/DL — LOW (ref 8.4–10.5)
CHLORIDE SERPL-SCNC: 96 MMOL/L — LOW (ref 98–110)
CO2 SERPL-SCNC: 28 MMOL/L — SIGNIFICANT CHANGE UP (ref 17–32)
CREAT SERPL-MCNC: 6 MG/DL — CRITICAL HIGH (ref 0.7–1.5)
EGFR: 10 ML/MIN/1.73M2 — LOW
EOSINOPHIL # BLD AUTO: 0.24 K/UL — SIGNIFICANT CHANGE UP (ref 0–0.7)
EOSINOPHIL NFR BLD AUTO: 2.9 % — SIGNIFICANT CHANGE UP (ref 0–8)
GLUCOSE BLDC GLUCOMTR-MCNC: 143 MG/DL — HIGH (ref 70–99)
GLUCOSE BLDC GLUCOMTR-MCNC: 150 MG/DL — HIGH (ref 70–99)
GLUCOSE BLDC GLUCOMTR-MCNC: 200 MG/DL — HIGH (ref 70–99)
GLUCOSE BLDC GLUCOMTR-MCNC: 99 MG/DL — SIGNIFICANT CHANGE UP (ref 70–99)
GLUCOSE SERPL-MCNC: 145 MG/DL — HIGH (ref 70–99)
HCT VFR BLD CALC: 23.6 % — LOW (ref 42–52)
HGB BLD-MCNC: 7.5 G/DL — LOW (ref 14–18)
IMM GRANULOCYTES NFR BLD AUTO: 0.5 % — HIGH (ref 0.1–0.3)
LYMPHOCYTES # BLD AUTO: 0.9 K/UL — LOW (ref 1.2–3.4)
LYMPHOCYTES # BLD AUTO: 10.9 % — LOW (ref 20.5–51.1)
MAGNESIUM SERPL-MCNC: 2.1 MG/DL — SIGNIFICANT CHANGE UP (ref 1.8–2.4)
MCHC RBC-ENTMCNC: 30 PG — SIGNIFICANT CHANGE UP (ref 27–31)
MCHC RBC-ENTMCNC: 31.8 G/DL — LOW (ref 32–37)
MCV RBC AUTO: 94.4 FL — HIGH (ref 80–94)
MONOCYTES # BLD AUTO: 0.69 K/UL — HIGH (ref 0.1–0.6)
MONOCYTES NFR BLD AUTO: 8.4 % — SIGNIFICANT CHANGE UP (ref 1.7–9.3)
NEUTROPHILS # BLD AUTO: 6.26 K/UL — SIGNIFICANT CHANGE UP (ref 1.4–6.5)
NEUTROPHILS NFR BLD AUTO: 76.2 % — HIGH (ref 42.2–75.2)
NRBC # BLD: 0 /100 WBCS — SIGNIFICANT CHANGE UP (ref 0–0)
PLATELET # BLD AUTO: 332 K/UL — SIGNIFICANT CHANGE UP (ref 130–400)
POTASSIUM SERPL-MCNC: 4.9 MMOL/L — SIGNIFICANT CHANGE UP (ref 3.5–5)
POTASSIUM SERPL-SCNC: 4.9 MMOL/L — SIGNIFICANT CHANGE UP (ref 3.5–5)
PROT SERPL-MCNC: 7.3 G/DL — SIGNIFICANT CHANGE UP (ref 6–8)
RBC # BLD: 2.5 M/UL — LOW (ref 4.7–6.1)
RBC # FLD: 15 % — HIGH (ref 11.5–14.5)
SODIUM SERPL-SCNC: 137 MMOL/L — SIGNIFICANT CHANGE UP (ref 135–146)
WBC # BLD: 8.22 K/UL — SIGNIFICANT CHANGE UP (ref 4.8–10.8)
WBC # FLD AUTO: 8.22 K/UL — SIGNIFICANT CHANGE UP (ref 4.8–10.8)

## 2022-11-20 PROCEDURE — 99233 SBSQ HOSP IP/OBS HIGH 50: CPT

## 2022-11-20 RX ADMIN — CLOPIDOGREL BISULFATE 75 MILLIGRAM(S): 75 TABLET, FILM COATED ORAL at 11:44

## 2022-11-20 RX ADMIN — INSULIN GLARGINE 20 UNIT(S): 100 INJECTION, SOLUTION SUBCUTANEOUS at 21:39

## 2022-11-20 RX ADMIN — Medication 5 UNIT(S): at 11:42

## 2022-11-20 RX ADMIN — Medication 40 MILLIGRAM(S): at 05:35

## 2022-11-20 RX ADMIN — Medication 12.5 MILLIGRAM(S): at 05:34

## 2022-11-20 RX ADMIN — SEVELAMER CARBONATE 800 MILLIGRAM(S): 2400 POWDER, FOR SUSPENSION ORAL at 17:28

## 2022-11-20 RX ADMIN — SEVELAMER CARBONATE 800 MILLIGRAM(S): 2400 POWDER, FOR SUSPENSION ORAL at 11:44

## 2022-11-20 RX ADMIN — PREGABALIN 1000 MICROGRAM(S): 225 CAPSULE ORAL at 11:43

## 2022-11-20 RX ADMIN — HEPARIN SODIUM 5000 UNIT(S): 5000 INJECTION INTRAVENOUS; SUBCUTANEOUS at 14:19

## 2022-11-20 RX ADMIN — Medication 12.5 MILLIGRAM(S): at 17:28

## 2022-11-20 RX ADMIN — SEVELAMER CARBONATE 800 MILLIGRAM(S): 2400 POWDER, FOR SUSPENSION ORAL at 08:40

## 2022-11-20 RX ADMIN — HEPARIN SODIUM 5000 UNIT(S): 5000 INJECTION INTRAVENOUS; SUBCUTANEOUS at 05:34

## 2022-11-20 RX ADMIN — SENNA PLUS 2 TABLET(S): 8.6 TABLET ORAL at 21:40

## 2022-11-20 RX ADMIN — Medication 81 MILLIGRAM(S): at 11:43

## 2022-11-20 RX ADMIN — ATORVASTATIN CALCIUM 40 MILLIGRAM(S): 80 TABLET, FILM COATED ORAL at 21:40

## 2022-11-20 RX ADMIN — Medication 1 MILLIGRAM(S): at 11:44

## 2022-11-20 RX ADMIN — FAMOTIDINE 20 MILLIGRAM(S): 10 INJECTION INTRAVENOUS at 11:43

## 2022-11-20 RX ADMIN — TAMSULOSIN HYDROCHLORIDE 0.4 MILLIGRAM(S): 0.4 CAPSULE ORAL at 21:40

## 2022-11-20 RX ADMIN — HEPARIN SODIUM 5000 UNIT(S): 5000 INJECTION INTRAVENOUS; SUBCUTANEOUS at 21:39

## 2022-11-20 RX ADMIN — Medication 30 MILLIGRAM(S): at 05:34

## 2022-11-20 NOTE — CHART NOTE - NSCHARTNOTEFT_GEN_A_CORE
Plan for sx tomorrow 11/21/22 @ 10:30am;  Excisional debridement of soft tissue and bone Left foot  Please make patient NPO at mn 11/20  T&S and COAG studies prior to OR  Please optimize lab values prior to OR    Podiatry [phone].

## 2022-11-20 NOTE — PROGRESS NOTE ADULT - SUBJECTIVE AND OBJECTIVE BOX
[facility] FOLLOW UP NOTE  --------------------------------------------------------------------------------  Chief Complaint:    24 hour events/subjective:        PAST HISTORY  --------------------------------------------------------------------------------  No significant changes to PMH, PSH, FHx, SHx, unless otherwise noted    ALLERGIES & MEDICATIONS  --------------------------------------------------------------------------------  Allergies    No Known Allergies    Intolerances      Standing Inpatient Medications  aspirin enteric coated 81 milliGRAM(s) Oral daily  atorvastatin 40 milliGRAM(s) Oral at bedtime  cefepime   IVPB 2000 milliGRAM(s) IV Intermittent <User Schedule>  clopidogrel Tablet 75 milliGRAM(s) Oral daily  cyanocobalamin 1000 MICROGram(s) Oral daily  darbepoetin Injectable Syringe 60 MICROGram(s) IV Push <User Schedule>  famotidine    Tablet 20 milliGRAM(s) Oral daily  folic acid 1 milliGRAM(s) Oral daily  furosemide    Tablet 40 milliGRAM(s) Oral daily  heparin   Injectable 5000 Unit(s) SubCutaneous every 8 hours  influenza   Vaccine 0.5 milliLiter(s) IntraMuscular once  insulin glargine Injectable (LANTUS) 20 Unit(s) SubCutaneous at bedtime  insulin lispro (ADMELOG) corrective regimen sliding scale   SubCutaneous three times a day before meals  insulin lispro Injectable (ADMELOG) 5 Unit(s) SubCutaneous before breakfast  insulin lispro Injectable (ADMELOG) 5 Unit(s) SubCutaneous before lunch  metoprolol tartrate 12.5 milliGRAM(s) Oral every 12 hours  NIFEdipine XL 30 milliGRAM(s) Oral daily  regadenoson Injectable 0.4 milliGRAM(s) IV Push once  senna 2 Tablet(s) Oral at bedtime  sevelamer carbonate 800 milliGRAM(s) Oral three times a day with meals  tamsulosin 0.4 milliGRAM(s) Oral at bedtime    PRN Inpatient Medications  acetaminophen     Tablet .. 650 milliGRAM(s) Oral every 6 hours PRN  melatonin 3 milliGRAM(s) Oral at bedtime PRN      REVIEW OF SYSTEMS  --------------------------------------------------------------------------------  Gen: No weight changes, fatigue, fevers/chills, weakness  Skin: No rashes  Head/Eyes/Ears/Mouth: No headache; Normal hearing; Normal vision w/o blurriness; No sinus pain/discomfort, sore throat  Respiratory: No dyspnea, cough, wheezing, hemoptysis  CV: No chest pain, PND, orthopnea  GI: No abdominal pain, diarrhea, constipation, nausea, vomiting, melena, hematochezia  : No increased frequency, dysuria, hematuria, nocturia  MSK: No joint pain/swelling; no back pain; no edema  Neuro: No dizziness/lightheadedness, weakness, seizures, numbness, tingling  Heme: No easy bruising or bleeding  Endo: No heat/cold intolerance  Psych: No significant nervousness, anxiety, stress, depression    All other systems were reviewed and are negative, except as noted.    VITALS/PHYSICAL EXAM  --------------------------------------------------------------------------------  T(C): 36.1 (11-20-22 @ 09:19), Max: 36.1 (11-20-22 @ 09:19)  HR: 82 (11-20-22 @ 09:19) (82 - 92)  BP: 155/75 (11-20-22 @ 09:19) (124/63 - 155/75)  RR: 18 (11-20-22 @ 09:19) (18 - 18)  SpO2: --  Wt(kg): --  Height (cm): 180.3 (11-18-22 @ 14:19)      11-19-22 @ 07:01  -  11-20-22 @ 07:00  --------------------------------------------------------  IN: 624 mL / OUT: 8 mL / NET: 616 mL      Physical Exam:  	Gen: NAD, well-appearing  	HEENT: PERRL, supple neck, clear oropharynx  	Pulm: CTA B/L  	CV: RRR, S1S2; no rub  	Back: No spinal or CVA tenderness; no sacral edema  	Abd: +BS, soft, nontender/nondistended  	: No suprapubic tenderness  	UE: Warm, FROM, no clubbing, intact strength; no edema; no asterixis  	LE: Warm, FROM, no clubbing, intact strength; no edema  	Neuro: No focal deficits, intact gait  	Psych: Normal affect and mood  	Skin: Warm, without rashes  	Vascular access:    LABS/STUDIES  --------------------------------------------------------------------------------              8.1    7.53  >-----------<  290      [11-19-22 @ 06:50]              24.6     135  |  94  |  32  ----------------------------<  137      [11-19-22 @ 06:50]  4.2   |  25  |  4.2        Ca     7.9     [11-19-22 @ 06:50]      Mg     2.1     [11-19-22 @ 06:50]    TPro  6.8  /  Alb  3.2  /  TBili  0.3  /  DBili  x   /  AST  8   /  ALT  <5  /  AlkPhos  143  [11-19-22 @ 06:50]          Creatinine Trend:  SCr 4.2 [11-19 @ 06:50]  SCr 5.6 [11-18 @ 05:57]  SCr 5.1 [11-17 @ 07:23]  SCr 4.9 [11-15 @ 06:57]  SCr 6.3 [11-14 @ 11:33]    Urinalysis - [10-23-22 @ 18:53]      Color Yellow / Appearance Slightly Turbid / SG 1.011 / pH 8.5      Gluc 200 mg/dL / Ketone Negative  / Bili Negative / Urobili <2 mg/dL       Blood Trace / Protein 100 mg/dL / Leuk Est Small / Nitrite Negative      RBC 6 / WBC 13 / Hyaline 2 / Gran  / Sq Epi  / Non Sq Epi 9 / Bacteria Negative      Iron 39, TIBC 133, %sat 29      [10-21-22 @ 10:30]  Ferritin 1126      [10-21-22 @ 10:30]  PTH -- (Ca 7.5)      [02-26-22 @ 16:00]   312  Vitamin D (25OH) 21      [02-26-22 @ 16:00]  HbA1c 5.8      [01-30-20 @ 06:46]  Lipid: chol 81, , HDL 22, LDL --      [10-15-22 @ 09:53]    HBsAb Nonreact      [11-03-22 @ 06:40]  HBsAg Nonreact      [11-03-22 @ 06:40]  HBcAb Nonreact      [11-03-22 @ 06:40]  HCV 0.14, Nonreact      [10-27-22 @ 04:09]

## 2022-11-20 NOTE — PROGRESS NOTE ADULT - SUBJECTIVE AND OBJECTIVE BOX
SCHWABACHER, LAWRENCE  [facility] F4-4B 020 A ([facility] F4-4B)      Patient was evaluated and examined  by bedside, no active complains       REVIEW OF SYSTEMS:  please see pertinent positives mentioned above, all other 12 ROS negative      T(C): , Max: 36.1 (11-20-22 @ 09:19)  HR: 82 (11-20-22 @ 09:19)  BP: 155/75 (11-20-22 @ 09:19)  RR: 18 (11-20-22 @ 09:19)  SpO2: --  CAPILLARY BLOOD GLUCOSE      POCT Blood Glucose.: 99 mg/dL (20 Nov 2022 07:37)  POCT Blood Glucose.: 155 mg/dL (19 Nov 2022 22:45)  POCT Blood Glucose.: 126 mg/dL (19 Nov 2022 17:39)  POCT Blood Glucose.: 141 mg/dL (19 Nov 2022 12:03)      PHYSICAL EXAM:  General: NAD, AAOX3, patient is laying comfortably in bed  HEENT: AT, NC, Supple, NO JVD, NO CB  Lungs: CTA B/L, no wheezing, no rhonchi  CVS: normal S1, S2, RRR, NO M/G/R  Abdomen: soft, bowel sounds present, non-tender, non-distended  Extremities: left foot wound covered with dressing, decreased sensation of b/l feet, left lower ext.  edema present , no clubbing, no cyanosis, positive peripheral pulses b/l  Neuro: no acute focal neurological deficits, chronic b/l lower ext decreased sensation   Skin: as above       LAB  CBC  Date: 11-19-22 @ 06:50  Mean cell Dnqyswqofr80.8  Mean cell Hemoglobin Conc32.9  Mean cell Volum 93.5  Platelet count-Automate 290  RBC Count 2.63  Red Cell Distrib Width14.8  WBC Count7.53  % Albumin, Urine--  Hematocrit 24.6  Hemoglobin 8.1              [facility]  11-19-22 @ 06:50  Blood Gas Arterial-Calcium,Ionized--  Blood Urea Nitrogen, Serum 32 mg/dL<H> [10 - 20]  Carbon Dioxide, Serum25 mmol/L [17 - 32]  Chloride, Serum94 mmol/L<L> [98 - 110]  Creatinie, Serum4.2 mg/dL<HH> [0.7 - 1.5] [Critical value:]  Glucose, Ssqza761 mg/dL<H> [70 - 99]  Potassium, Serum4.2 mmol/L [3.5 - 5.0]  Sodium, Serum 135 mmol/L [135 - 146]            Microbiology:    Culture - Other (collected 11-03-22 @ 11:50)  Source: .Other None  Final Report (11-05-22 @ 20:52):    No growth    Culture - Urine (collected 10-25-22 @ 20:46)  Source: Clean Catch Clean Catch (Midstream)  Final Report (10-27-22 @ 00:19):    No growth    Culture - Tissue with Gram Stain (collected 10-21-22 @ 14:05)  Source: .Tissue None  Gram Stain (10-22-22 @ 01:55):    No polymorphonuclear leukocytes seen per low power field    No organisms seen per oil power field  Final Report (10-26-22 @ 15:47):    Moderate Proteus mirabilis    Rare Pseudomonas putida group    Rare Enterococcus faecium (vancomycin resistant)    Rare Staphylococcus haemolyticus  Organism: Proteus mirabilis  Pseudomonas putida group  Enterococcus faecium (vancomycin resistant)  Staphylococcus haemolyticus (10-26-22 @ 15:47)  Organism: Staphylococcus haemolyticus (10-26-22 @ 15:47)      -  Ampicillin/Sulbactam: R 16/8      -  Cefazolin: R >16      -  Clindamycin: R >4      -  Erythromycin: R >4      -  Gentamicin: R >8 Should not be used as monotherapy      -  Oxacillin: R >2      -  Penicillin: R >8      -  Rifampin: R >2 Should not be used as monotherapy      -  Tetra/Doxy: S <=1      -  Trimethoprim/Sulfamethoxazole: R >2/38      -  Vancomycin: S 4      Method Type: CONNER  Organism: Enterococcus faecium (vancomycin resistant) (10-26-22 @ 15:47)      -  Ampicillin: R >8 Predicts results to ampicillin/sulbactam, amoxacillin-clavulanate and  piperacillin-tazobactam.      -  Daptomycin: SDD 4 The breakpoint for SDD (sensitive dose dependent)is based on a dosage regimen of 8-12 mg/kg administered every 24 h in adults and is intended for serious infections due to E. faecium. Consultation with an infectious diseases specialist is recommended.      -  Levofloxacin: R >4      -  Linezolid: S 2      -  Tetra/Doxy: R >8      -  Vancomycin: R >16      Method Type: CONNER  Organism: Pseudomonas putida group (10-26-22 @ 15:47)      -  Amikacin: S <=16      -  Aztreonam: S <=4      -  Cefepime: S <=2      -  Ceftriaxone: S <=1      -  Ciprofloxacin: S <=0.25      -  Gentamicin: S <=2      -  Levofloxacin: S <=0.5      -  Meropenem: S <=1      -  Piperacillin/Tazobactam: S <=8      -  Tobramycin: S <=2      -  Trimethoprim/Sulfamethoxazole: S <=0.5/9.5      Method Type: CONNER  Organism: Proteus mirabilis (10-26-22 @ 15:47)      -  Amikacin: S <=16      -  Amoxicillin/Clavulanic Acid: S <=8/4      -  Ampicillin: S <=8 These ampicillin results predict results for amoxicillin      -  Ampicillin/Sulbactam: S <=4/2 Enterobacter, Klebsiella aerogenes, Citrobacter, and Serratia may develop resistance during prolonged therapy (3-4 days)      -  Aztreonam: S <=4      -  Cefazolin: S <=2 Enterobacter, Klebsiella aerogenes, Citrobacter, and Serratia may develop resistance during prolonged therapy (3-4 days)      -  Cefepime: S <=2      -  Cefoxitin: S <=8      -  Ceftriaxone: S <=1 Enterobacter, Klebsiella aerogenes, Citrobacter, and Serratia may develop resistance during prolonged therapy      -  Ciprofloxacin: S <=0.25      -  Ertapenem: S <=0.5      -  Gentamicin: S <=2      -  Levofloxacin: S <=0.5      -  Meropenem: S <=1      -  Piperacillin/Tazobactam: S <=8      -  Tobramycin: S <=2      -  Trimethoprim/Sulfamethoxazole: S <=0.5/9.5      Method Type: CONNER    Culture - Surgical Swab (collected 10-21-22 @ 14:04)  Source: .Surgical Swab None  Final Report (10-26-22 @ 16:01):    Rare Proteus vulgaris group    Few Enterococcus faecium (vancomycin resistant)    Rare Pseudomonas putida group    Rare Coag Negative Staphylococcus "Susceptibilities not performed"  Organism: Proteus vulgaris group  Enterococcus faecium (vancomycin resistant)  Pseudomonas putida group (10-26-22 @ 16:01)  Organism: Pseudomonas putida group (10-26-22 @ 16:01)      -  Amikacin: S <=16      -  Aztreonam: S 8      -  Cefepime: S <=2      -  Ceftriaxone: S 8      -  Ciprofloxacin: S <=0.25      -  Gentamicin: S <=2      -  Levofloxacin: S <=0.5      -  Meropenem: S <=1      -  Piperacillin/Tazobactam: S <=8      -  Tobramycin: S <=2      -  Trimethoprim/Sulfamethoxazole: R >2/38      Method Type: CONNER  Organism: Enterococcus faecium (vancomycin resistant) (10-26-22 @ 16:01)      -  Ampicillin: R >8 Predicts results to ampicillin/sulbactam, amoxacillin-clavulanate and  piperacillin-tazobactam.      -  Daptomycin: SDD 4 The breakpoint for SDD (sensitive dose dependent)is based on a dosage regimen of 8-12 mg/kg administered every 24 h in adults and is intended for serious infections due to E. faecium. Consultation with an infectious diseases specialist is recommended.      -  Levofloxacin: R >4      -  Linezolid: S 2      -  Tetra/Doxy: R >8      -  Vancomycin: R >16      Method Type: CONNER  Organism: Proteus vulgaris group (10-26-22 @ 16:01)      -  Amikacin: S <=16      -  Amoxicillin/Clavulanic Acid: S <=8/4      -  Ampicillin: R <=8 These ampicillin results predict results for amoxicillin      -  Ampicillin/Sulbactam: S <=4/2 Enterobacter, Klebsiella aerogenes, Citrobacter, and Serratia may develop resistance during prolonged therapy (3-4 days)      -  Aztreonam: S <=4      -  Cefazolin: R <=2 Enterobacter, Klebsiella aerogenes, Citrobacter, and Serratia may develop resistance during prolonged therapy (3-4 days)      -  Cefepime: S <=2      -  Cefoxitin: S <=8      -  Ceftriaxone: S <=1 Enterobacter, Klebsiella aerogenes, Citrobacter, and Serratia may develop resistance during prolonged therapy      -  Ciprofloxacin: S <=0.25      -  Ertapenem: S <=0.5      -  Gentamicin: S <=2      -  Levofloxacin: S <=0.5      -  Meropenem: S <=1      -  Piperacillin/Tazobactam: S <=8      -  Tobramycin: S <=2      -  Trimethoprim/Sulfamethoxazole: S <=0.5/9.5      Method Type: CONNER        Medications:  acetaminophen     Tablet .. 650 milliGRAM(s) Oral every 6 hours PRN  aspirin enteric coated 81 milliGRAM(s) Oral daily  atorvastatin 40 milliGRAM(s) Oral at bedtime  cefepime   IVPB 2000 milliGRAM(s) IV Intermittent <User Schedule>  clopidogrel Tablet 75 milliGRAM(s) Oral daily  cyanocobalamin 1000 MICROGram(s) Oral daily  darbepoetin Injectable Syringe 60 MICROGram(s) IV Push <User Schedule>  famotidine    Tablet 20 milliGRAM(s) Oral daily  folic acid 1 milliGRAM(s) Oral daily  furosemide    Tablet 40 milliGRAM(s) Oral daily  heparin   Injectable 5000 Unit(s) SubCutaneous every 8 hours  influenza   Vaccine 0.5 milliLiter(s) IntraMuscular once  insulin glargine Injectable (LANTUS) 20 Unit(s) SubCutaneous at bedtime  insulin lispro (ADMELOG) corrective regimen sliding scale   SubCutaneous three times a day before meals  insulin lispro Injectable (ADMELOG) 5 Unit(s) SubCutaneous before breakfast  insulin lispro Injectable (ADMELOG) 5 Unit(s) SubCutaneous before lunch  melatonin 3 milliGRAM(s) Oral at bedtime PRN  metoprolol tartrate 12.5 milliGRAM(s) Oral every 12 hours  NIFEdipine XL 30 milliGRAM(s) Oral daily  regadenoson Injectable 0.4 milliGRAM(s) IV Push once  senna 2 Tablet(s) Oral at bedtime  sevelamer carbonate 800 milliGRAM(s) Oral three times a day with meals  tamsulosin 0.4 milliGRAM(s) Oral at bedtime        Assessment and Plan:  Patient is a 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 and cefepime post dialysis s.p wound Vac, chronic anemia, HFmrEF, presented to the hospital for chest pain     # Chest pain, resolved   # CAD s/p PCI, CABG  # HTN, stable  # HLD  # Chronic HFmrEF, resolved  -  troponin trended down, d/c  telemetry  - Lexiscan without new ischemia, noted chronic old scar, continue medical therapy as per Cardiology, may proceed with debridement for left foot ulcer  - c/w aspirin, plavix, lipitor 40 mg, nifedipine 30 mg daily    # Anemia of chronic disease  - hb on admission 6.6 baseline 7.6. s/p 1U PRBC  - monitor CBC, transfuse to keep >8  - most recent Hg - 8.1     # ESRD on HD MWF  - HD per nephrology    # Left foot ulcer  # Recent hx Left calcaneal OM   - wound Cx 9/16 Enterobacter cloacae  - wound CX 10/16 VRE Faecim, Enterobacter cloacae complex, Proteus vulgaris, Morganella morganii   - s/p debridement 10/21 Wound Cx Proteus mirabilis, VRE faecium, Pseudomonas putida  - s/p debridement 10/24 Crumble L calcaneous   - s/p debridement 11/3 with wound vac in place  - cefepime 2g post HD through 11/30; s/p 3 session of daptomycin post-HD  - on last admission, ID plan for 4 weeks from last debridement (11/3-11/30)  - Podiatry consulted, planning for repeat debridement possibly Monday afternoon, keep NPO after midnight on Sunday    # PAD  - s/p angiogram LLE with peroneal artery angioplasty and arthrectomy 10/27  - c/w aspirin, plavix and statin    # Type 2 DM,  CAPILLARY BLOOD GLUCOSE  POCT Blood Glucose.: 99 mg/dL (20 Nov 2022 07:37)  POCT Blood Glucose.: 155 mg/dL (19 Nov 2022 22:45)  POCT Blood Glucose.: 126 mg/dL (19 Nov 2022 17:39)  POCT Blood Glucose.: 141 mg/dL (19 Nov 2022 12:03)  - monitor fsg glucose, c/w current insulin regimen     # BPH- c/w tamsulosin 0.4 mg daily    # DVT prophylaxis - on heparin subc  # Code status - Full Code    #Progress Note Handoff: f/up with Podiatry sx. for possible left foot wound debridement tomorrow  Family discussion: medical plan of care d/w pt. by bedside Disposition: Home_ once medically stable    Total time spent to complete patient's bedside assessment, review medical chart, discuss medical plan of care with covering medical team was more than 35 minutes with >50% of time spent face to face with patient, discussion with patient/family and/or coordination of care

## 2022-11-20 NOTE — PROGRESS NOTE ADULT - ASSESSMENT
ESRD pt , doing well on dialysis , DFU ,surgery podiatry noted , hemoglobin 8.1 , iron stat 29 continue RAMSEY and treat infection

## 2022-11-21 LAB
ALBUMIN SERPL ELPH-MCNC: 3.1 G/DL — LOW (ref 3.5–5.2)
ALP SERPL-CCNC: 195 U/L — HIGH (ref 30–115)
ALT FLD-CCNC: <5 U/L — SIGNIFICANT CHANGE UP (ref 0–41)
ANION GAP SERPL CALC-SCNC: 12 MMOL/L — SIGNIFICANT CHANGE UP (ref 7–14)
APTT BLD: 29.3 SEC — SIGNIFICANT CHANGE UP (ref 27–39.2)
APTT BLD: 32 SEC — SIGNIFICANT CHANGE UP (ref 27–39.2)
AST SERPL-CCNC: 7 U/L — SIGNIFICANT CHANGE UP (ref 0–41)
BASOPHILS # BLD AUTO: 0.1 K/UL — SIGNIFICANT CHANGE UP (ref 0–0.2)
BASOPHILS NFR BLD AUTO: 1.2 % — HIGH (ref 0–1)
BILIRUB SERPL-MCNC: 0.3 MG/DL — SIGNIFICANT CHANGE UP (ref 0.2–1.2)
BLD GP AB SCN SERPL QL: SIGNIFICANT CHANGE UP
BLD GP AB SCN SERPL QL: SIGNIFICANT CHANGE UP
BUN SERPL-MCNC: 54 MG/DL — HIGH (ref 10–20)
CALCIUM SERPL-MCNC: 8.4 MG/DL — SIGNIFICANT CHANGE UP (ref 8.4–10.4)
CHLORIDE SERPL-SCNC: 95 MMOL/L — LOW (ref 98–110)
CO2 SERPL-SCNC: 26 MMOL/L — SIGNIFICANT CHANGE UP (ref 17–32)
CREAT SERPL-MCNC: 6.8 MG/DL — CRITICAL HIGH (ref 0.7–1.5)
EGFR: 8 ML/MIN/1.73M2 — LOW
EOSINOPHIL # BLD AUTO: 0.2 K/UL — SIGNIFICANT CHANGE UP (ref 0–0.7)
EOSINOPHIL NFR BLD AUTO: 2.4 % — SIGNIFICANT CHANGE UP (ref 0–8)
GLUCOSE BLDC GLUCOMTR-MCNC: 105 MG/DL — HIGH (ref 70–99)
GLUCOSE BLDC GLUCOMTR-MCNC: 195 MG/DL — HIGH (ref 70–99)
GLUCOSE BLDC GLUCOMTR-MCNC: 85 MG/DL — SIGNIFICANT CHANGE UP (ref 70–99)
GLUCOSE BLDC GLUCOMTR-MCNC: 96 MG/DL — SIGNIFICANT CHANGE UP (ref 70–99)
GLUCOSE BLDC GLUCOMTR-MCNC: 98 MG/DL — SIGNIFICANT CHANGE UP (ref 70–99)
GLUCOSE SERPL-MCNC: 86 MG/DL — SIGNIFICANT CHANGE UP (ref 70–99)
HCT VFR BLD CALC: 22.3 % — LOW (ref 42–52)
HGB BLD-MCNC: 7.2 G/DL — LOW (ref 14–18)
IMM GRANULOCYTES NFR BLD AUTO: 0.2 % — SIGNIFICANT CHANGE UP (ref 0.1–0.3)
INR BLD: 1.13 RATIO — SIGNIFICANT CHANGE UP (ref 0.65–1.3)
INR BLD: 1.13 RATIO — SIGNIFICANT CHANGE UP (ref 0.65–1.3)
LYMPHOCYTES # BLD AUTO: 1.15 K/UL — LOW (ref 1.2–3.4)
LYMPHOCYTES # BLD AUTO: 14 % — LOW (ref 20.5–51.1)
MAGNESIUM SERPL-MCNC: 2.1 MG/DL — SIGNIFICANT CHANGE UP (ref 1.8–2.4)
MCHC RBC-ENTMCNC: 29.9 PG — SIGNIFICANT CHANGE UP (ref 27–31)
MCHC RBC-ENTMCNC: 32.3 G/DL — SIGNIFICANT CHANGE UP (ref 32–37)
MCV RBC AUTO: 92.5 FL — SIGNIFICANT CHANGE UP (ref 80–94)
MONOCYTES # BLD AUTO: 0.71 K/UL — HIGH (ref 0.1–0.6)
MONOCYTES NFR BLD AUTO: 8.6 % — SIGNIFICANT CHANGE UP (ref 1.7–9.3)
NEUTROPHILS # BLD AUTO: 6.06 K/UL — SIGNIFICANT CHANGE UP (ref 1.4–6.5)
NEUTROPHILS NFR BLD AUTO: 73.6 % — SIGNIFICANT CHANGE UP (ref 42.2–75.2)
NRBC # BLD: 0 /100 WBCS — SIGNIFICANT CHANGE UP (ref 0–0)
PLATELET # BLD AUTO: 263 K/UL — SIGNIFICANT CHANGE UP (ref 130–400)
POTASSIUM SERPL-MCNC: 4.7 MMOL/L — SIGNIFICANT CHANGE UP (ref 3.5–5)
POTASSIUM SERPL-SCNC: 4.7 MMOL/L — SIGNIFICANT CHANGE UP (ref 3.5–5)
PROT SERPL-MCNC: 7.3 G/DL — SIGNIFICANT CHANGE UP (ref 6–8)
PROTHROM AB SERPL-ACNC: 12.9 SEC — HIGH (ref 9.95–12.87)
PROTHROM AB SERPL-ACNC: 12.9 SEC — HIGH (ref 9.95–12.87)
RBC # BLD: 2.41 M/UL — LOW (ref 4.7–6.1)
RBC # FLD: 15.2 % — HIGH (ref 11.5–14.5)
SODIUM SERPL-SCNC: 133 MMOL/L — LOW (ref 135–146)
WBC # BLD: 8.24 K/UL — SIGNIFICANT CHANGE UP (ref 4.8–10.8)
WBC # FLD AUTO: 8.24 K/UL — SIGNIFICANT CHANGE UP (ref 4.8–10.8)

## 2022-11-21 PROCEDURE — 99233 SBSQ HOSP IP/OBS HIGH 50: CPT

## 2022-11-21 RX ADMIN — SENNA PLUS 2 TABLET(S): 8.6 TABLET ORAL at 22:04

## 2022-11-21 RX ADMIN — INSULIN GLARGINE 20 UNIT(S): 100 INJECTION, SOLUTION SUBCUTANEOUS at 22:04

## 2022-11-21 RX ADMIN — CEFEPIME 100 MILLIGRAM(S): 1 INJECTION, POWDER, FOR SOLUTION INTRAMUSCULAR; INTRAVENOUS at 17:58

## 2022-11-21 RX ADMIN — CLOPIDOGREL BISULFATE 75 MILLIGRAM(S): 75 TABLET, FILM COATED ORAL at 12:43

## 2022-11-21 RX ADMIN — Medication 12.5 MILLIGRAM(S): at 17:59

## 2022-11-21 RX ADMIN — Medication 40 MILLIGRAM(S): at 05:21

## 2022-11-21 RX ADMIN — TAMSULOSIN HYDROCHLORIDE 0.4 MILLIGRAM(S): 0.4 CAPSULE ORAL at 22:02

## 2022-11-21 RX ADMIN — Medication 12.5 MILLIGRAM(S): at 05:21

## 2022-11-21 RX ADMIN — ATORVASTATIN CALCIUM 40 MILLIGRAM(S): 80 TABLET, FILM COATED ORAL at 22:02

## 2022-11-21 RX ADMIN — Medication 81 MILLIGRAM(S): at 12:43

## 2022-11-21 RX ADMIN — FAMOTIDINE 20 MILLIGRAM(S): 10 INJECTION INTRAVENOUS at 12:43

## 2022-11-21 RX ADMIN — HEPARIN SODIUM 5000 UNIT(S): 5000 INJECTION INTRAVENOUS; SUBCUTANEOUS at 14:51

## 2022-11-21 RX ADMIN — PREGABALIN 1000 MICROGRAM(S): 225 CAPSULE ORAL at 12:44

## 2022-11-21 RX ADMIN — Medication 30 MILLIGRAM(S): at 05:21

## 2022-11-21 RX ADMIN — SEVELAMER CARBONATE 800 MILLIGRAM(S): 2400 POWDER, FOR SUSPENSION ORAL at 18:00

## 2022-11-21 RX ADMIN — Medication 1 MILLIGRAM(S): at 12:43

## 2022-11-21 RX ADMIN — Medication 60 MICROGRAM(S): at 11:07

## 2022-11-21 RX ADMIN — HEPARIN SODIUM 5000 UNIT(S): 5000 INJECTION INTRAVENOUS; SUBCUTANEOUS at 22:05

## 2022-11-21 NOTE — H&P ADULT - ASSESSMENT
Pharmacist chart review completed for refill of XTANDI indicates no medication or significant health changes since last pharmacist counseling. No questions for the pharmacist. Communicated with patient over phone. Patient's prescription was billed through Medicaid ([facility]). Medication shipped on 11/22 via [employer]PS to [address] [place] [id number] for delivery in 1-2 business days.     Blanca Golden   [facility]  Phone: [phone]  [email or web address]           
 63 y/o with a PMHx of DM, HTN, ESRD (on dialysis M/W/F, last session on 10/14), CAD s/p 1 stent on 2008 and quadruple CABG on 2012, PAD s/p bilateral angioplasty at [facility], was transferred from [facility] complaining of weakness and unhealed wound in the left foot.    #Left Foot wound, likely OM  - Podiatry consult  - ID consult   - Blood cultures  - X-ray L foot  - MRI L foot  - Start IV Vancomycin 1000mg  - Start IV Meropenem 500mg    #Leg weakness likely secondary to PAD  - vascular surgery consult     #ESRD  - Last dialysis on Saturday (10/14)  - Nephro consult     #CAD  - c/w statin  - c/w ASA 81mg  - c/W plavix   - c/w Lopressor 50mg  - c/w Lasix 40mg PO BID    #DM  - Obtain height/weight   - start Insulin sliding scale   - Pt takes 36 units of Lispro at home and trulicity 1/weel    #MISC  - Diet: DASH  - Activity: Ambulate as tolerated   - DVT: Heparin   - Complete Med rec in the AM

## 2022-11-21 NOTE — PROGRESS NOTE ADULT - ASSESSMENT
63 yo male, h/o type 2 DM on insulin, CAD s.p stent 2008, s/p CABG 2012 (Dr Smith), PAD s/p angioplasty and stent b/l LE, BPH, ESRD on HD through left AVF (MWF follows with Dr MARY Paula), DFU sp left toe amputation, left calcaneal OM on daptomycin and cefepime post dialysis s.p wound Vac, chronic anemia, HFmrEF, presented to the hospital for chest pain. Found to have anemia s/p 1 unit of prbc.    ESRD on HD/ Anemia / Chest pain/ OM on abx  HD today 3h opti 160 2k UF 3 l as toelrated   RAMSEY weekly post HD 60 mcg/ transfuse if Hb< 7  on sevelamer 800 mg po tid/ ph at goal / repeat   BP controlled  abx for OM - Cefepime  plan for debridement with podiatry today /cardiology f/up noted   Fluid restriction   will follow

## 2022-11-21 NOTE — PROGRESS NOTE ADULT - SUBJECTIVE AND OBJECTIVE BOX
I have reviewed discharge instructions with the patient. The patient verbalized understanding. Current Discharge Medication List      START taking these medications    Details   HYDROcodone-acetaminophen (Norco) 5-325 mg per tablet Take 1 Tablet by mouth every six (6) hours as needed for Pain for up to 3 days. Max Daily Amount: 4 Tablets.   Qty: 10 Tablet, Refills: 0  Start date: 5/12/2022, End date: 5/15/2022    Associated Diagnoses: Avascular necrosis of right femoral head ([address] Nephrology progress note  Patient is seen and examined, events over the last 24 h noted .  Lying in bed comfortable   No events     Allergies:  No Known Allergies    Hospital Medications:   MEDICATIONS  (STANDING):  aspirin enteric coated 81 milliGRAM(s) Oral daily  atorvastatin 40 milliGRAM(s) Oral at bedtime  cefepime   IVPB 2000 milliGRAM(s) IV Intermittent <User Schedule>  clopidogrel Tablet 75 milliGRAM(s) Oral daily  cyanocobalamin 1000 MICROGram(s) Oral daily  darbepoetin Injectable Syringe 60 MICROGram(s) IV Push <User Schedule>  famotidine    Tablet 20 milliGRAM(s) Oral daily  folic acid 1 milliGRAM(s) Oral daily  furosemide    Tablet 40 milliGRAM(s) Oral daily  heparin   Injectable 5000 Unit(s) SubCutaneous every 8 hours  influenza   Vaccine 0.5 milliLiter(s) IntraMuscular once  insulin glargine Injectable (LANTUS) 20 Unit(s) SubCutaneous at bedtime  insulin lispro (ADMELOG) corrective regimen sliding scale   SubCutaneous three times a day before meals  insulin lispro Injectable (ADMELOG) 5 Unit(s) SubCutaneous before breakfast  insulin lispro Injectable (ADMELOG) 5 Unit(s) SubCutaneous before lunch  metoprolol tartrate 12.5 milliGRAM(s) Oral every 12 hours  NIFEdipine XL 30 milliGRAM(s) Oral daily  regadenoson Injectable 0.4 milliGRAM(s) IV Push once  senna 2 Tablet(s) Oral at bedtime  sevelamer carbonate 800 milliGRAM(s) Oral three times a day with meals  tamsulosin 0.4 milliGRAM(s) Oral at bedtime        VITALS:  T(F): 96.9 (11-21-22 @ 00:00), Max: 97 (11-20-22 @ 09:19)  HR: 97 (11-21-22 @ 05:16)  BP: 158/74 (11-21-22 @ 05:16)  RR: 18 (11-21-22 @ 05:16)      11-19 @ 07:01  -  11-20 @ 07:00  --------------------------------------------------------  IN: 624 mL / OUT: 8 mL / NET: 616 mL      Height (cm): 180.3 (11-21 @ 01:22)  Weight (kg): 90.8 (11-21 @ 01:22)  BMI (kg/m2): 27.9 (11-21 @ 01:22)  BSA (m2): 2.11 (11-21 @ 01:22)    PHYSICAL EXAM:  Constitutional: NAD  Neck: No JVD  Respiratory: CTAB,   Cardiovascular: S1, S2, RRR  Gastrointestinal: BS+, soft, NT/ND  Extremities: No cyanosis or clubbing. foot in dressing   :  No schneider.   Skin: No rashes    LABS:  11-20    137  |  96<L>  |  43<H>  ----------------------------<  145<H>  4.9   |  28  |  6.0<HH>    Ca    8.3<L>      20 Nov 2022 04:30  Mg     2.1     11-20    TPro  7.3  /  Alb  3.1<L>  /  TBili  0.3  /  DBili      /  AST  7   /  ALT  <5  /  AlkPhos  181<H>  11-20                          7.5    8.22  )-----------( 332      ( 20 Nov 2022 04:30 )             23.6       Urine Studies:        Iron 39, TIBC 133, %sat 29      [10-21-22 @ 10:30]  Ferritin 1126      [10-21-22 @ 10:30]  PTH -- (Ca 7.5)      [02-26-22 @ 16:00]   312  Vitamin D (25OH) 21      [02-26-22 @ 16:00]  HbA1c 5.8      [01-30-20 @ 06:46]  Lipid: chol 81, , HDL 22, LDL --      [10-15-22 @ 09:53]    HBsAb <3.0      [12-29-19 @ 17:44]  HBsAb Nonreact      [11-03-22 @ 06:40]  HBsAg Nonreact      [11-03-22 @ 06:40]  HBcAb Nonreact      [11-03-22 @ 06:40]  HCV 0.14, Nonreact      [10-27-22 @ 04:09]        RADIOLOGY & ADDITIONAL STUDIES:

## 2022-11-21 NOTE — CHART NOTE - NSCHARTNOTEFT_GEN_A_CORE
Surgery for podiatry cancelled today; rebooked for Fri 11/25 excisional debridement of soft tissue/bone left foot     Please make pt NPO MN Thurs 11/24  Please optimize all lab values prior to OR    Podiatry   [phone]

## 2022-11-21 NOTE — PROGRESS NOTE ADULT - SUBJECTIVE AND OBJECTIVE BOX
SCHWABACHER, LAWRENCE  62y  [facility] F4-4B 020 A      Patient is a 62y old  Male who presents with a chief complaint of Chest pain (21 Nov 2022 09:13)      INTERVAL HPI/OVERNIGHT EVENTS:  Patient feels well. he has no complains. no overnight event  hb is low. prbc was ordered  denies chest pain, sob      REVIEW OF SYSTEMS:        FAMILY HISTORY:  Family history of heart disease (Father)    DM (diabetes mellitus) (Mother)    ESRD (end stage renal disease) on dialysis (Mother)      T(C): 36.1 (11-21-22 @ 13:53), Max: 36.1 (11-21-22 @ 00:00)  HR: 87 (11-21-22 @ 13:53) (77 - 97)  BP: 134/72 (11-21-22 @ 13:53) (134/72 - 158/74)  RR: 18 (11-21-22 @ 13:53) (18 - 19)  SpO2: 97% (11-21-22 @ 13:53) (97% - 97%)  Wt(kg): --Vital Signs Last 24 Hrs  T(C): 36.1 (21 Nov 2022 13:53), Max: 36.1 (21 Nov 2022 00:00)  T(F): 96.9 (21 Nov 2022 00:00), Max: 96.9 (21 Nov 2022 00:00)  HR: 87 (21 Nov 2022 13:53) (77 - 97)  BP: 134/72 (21 Nov 2022 13:53) (134/72 - 158/74)  BP(mean): 104 (21 Nov 2022 13:53) (104 - 104)  RR: 18 (21 Nov 2022 13:53) (18 - 19)  SpO2: 97% (21 Nov 2022 13:53) (97% - 97%)    Parameters below as of 21 Nov 2022 13:53    O2 Flow (L/min): 2      PHYSICAL EXAM:  GENERAL: NAD, well-groomed, well-developed  NERVOUS SYSTEM:  Alert & Oriented X3,   PULM:Faint crackles   CARDIAC: Regular rate and rhythm  GI: Soft, Nontender, Nondistended; Bowel sounds present  EXTREMITIES:  2+ Peripheral Pulse    Consultant(s) Notes Reviewed:  [x ] YES  [ ] NO  Care Discussed with Consultants/Other Providers [ x] YES  [ ] NO    LABS:                            7.2    8.24  )-----------( 263      ( 21 Nov 2022 07:41 )             22.3   11-21    133<L>  |  95<L>  |  54<H>  ----------------------------<  86  4.7   |  26  |  6.8<HH>    Ca    8.4      21 Nov 2022 07:41  Mg     2.1     11-21    TPro  7.3  /  Alb  3.1<L>  /  TBili  0.3  /  DBili  x   /  AST  7   /  ALT  <5  /  AlkPhos  195<H>  11-21            acetaminophen     Tablet .. 650 milliGRAM(s) Oral every 6 hours PRN  aspirin enteric coated 81 milliGRAM(s) Oral daily  atorvastatin 40 milliGRAM(s) Oral at bedtime  cefepime   IVPB 2000 milliGRAM(s) IV Intermittent <User Schedule>  clopidogrel Tablet 75 milliGRAM(s) Oral daily  cyanocobalamin 1000 MICROGram(s) Oral daily  darbepoetin Injectable Syringe 60 MICROGram(s) IV Push <User Schedule>  famotidine    Tablet 20 milliGRAM(s) Oral daily  folic acid 1 milliGRAM(s) Oral daily  furosemide    Tablet 40 milliGRAM(s) Oral daily  heparin   Injectable 5000 Unit(s) SubCutaneous every 8 hours  influenza   Vaccine 0.5 milliLiter(s) IntraMuscular once  insulin glargine Injectable (LANTUS) 20 Unit(s) SubCutaneous at bedtime  insulin lispro (ADMELOG) corrective regimen sliding scale   SubCutaneous three times a day before meals  insulin lispro Injectable (ADMELOG) 5 Unit(s) SubCutaneous before breakfast  insulin lispro Injectable (ADMELOG) 5 Unit(s) SubCutaneous before lunch  melatonin 3 milliGRAM(s) Oral at bedtime PRN  metoprolol tartrate 12.5 milliGRAM(s) Oral every 12 hours  NIFEdipine XL 30 milliGRAM(s) Oral daily  regadenoson Injectable 0.4 milliGRAM(s) IV Push once  senna 2 Tablet(s) Oral at bedtime  sevelamer carbonate 800 milliGRAM(s) Oral three times a day with meals  tamsulosin 0.4 milliGRAM(s) Oral at bedtime      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 and cefepime post dialysis s.p wound Vac, chronic anemia, HFmrEF, presented to the hospital for chest pain     1. Chest pain. hx of CAD s/p PCI/CABG and PAD s/p angioplasty  ACS/CAD presenting with Chest pain  - Admit to medicine  - Cont aspirin and plavix   - Cont statin 40mg HS   - Cont Nifedipine 30mg po daily   - Troponin stable between 0.7-0.8  NST: FIXED PERFUSION DEFECT INVOLVING ANTERIOR WALL OF THE LEFT VENTRICLE EXTENDING TO APEX CONSISTENT WITH SCAR. GLOBAL HYPOKINESIS WITH POOR THICKENING OF THE ANTERIOR WALL. EF  36 %       2. NSVT  -10 beats of NSVT observed on tele on 11/16 PM  -Started Metoprolol 12.5 BID  -Maintain K>4 and Mg>2    3. Anemia of chronic disease s/p 1 PRBC   -11/21 hb 7.2, will transfuse 1 unit after T&S comes back   - Monitor CBC, transfuse to keep >8    4.  ESRD on HD   - HD per nephrology  - B/L pitting edema, little urine output    5. Recent hx Left calcaneal OM   - Wound Cx 9/16 - Enterobacter cloacae  - wound CX 10/16 VRE Faecium Enterobacter cloacae complex, Proteus vulgaris, Morganella morganii   - s/p debridement 10/21 Wound Cx Proteus mirabilis, VRE faecium, Pseudomonas putida  - s/p debridement 10/24 Crumble L calcaneous   - s/p debridement 11/3 with wound vac in place  - Cefepime 2g post HD through 11/30; s/p 3 session of daptomycin post-HD  - On last admission, ID plan for 4 weeks from last debridement (11/3-11/30)  -Debridement 11/21 PM     6.  PAD  - s/p angiogram LLE with peroneal artery angioplasty and arterectomy 10/27  - Cont meds as above    7. Type 2 DM  Continue insulin regimen    8.  BPH  - c/w Tamsulosin 0.4 mg daily    Pending: Podiatry procedure  -DVT prophylaxis: Heparin  -GI prophylaxis: Not indicated  -Diet: Renal, 1.2l fluid restriction  -Code status: Full code      Case Discussed with House Staff   36  minutes spent on total encounter; more than 50% of the visit was spent counseling and/or coordinating care by the attending physician.   Spectra [phone]

## 2022-11-21 NOTE — PROGRESS NOTE ADULT - SUBJECTIVE AND OBJECTIVE BOX
LAWRENCE SCHWABACHER 62y Male  MRN#: [id number]   CODE STATUS:________    Hospital Day: 7d    Pt is currently admitted with the primary diagnosis of  chest pain     SUBJECTIVE  Hospital Course  63 yo male, h/o type 2 DM on insulin, CAD s.p stent 2008, s/p CABG 2012 (Dr Smith), PAD s/p angioplasty and stent b/l LE, BPH, ESRD on HD through left AVF (MWF follows with Dr MARY Paula), DFU sp left toe amputation, left calcaneal OM on daptomycin and cefepime post dialysis s.p wound Vac, chronic anemia, HFmrEF, presented to the hospital for chest pain   Pain is mid sternal, pressure like, discomfort rather than pain, occurring at rest, non radiating, exacerbated by cough, not similar to previous episodes of chest pain, not associated with tremor , diaphoresis, numbness, lightheadedness; Pain has improved with tylenol initially and then resolved with nitroglycerin SL   Patient denies fever, chills, shortness of breath, sputum, abdominal pain; ROS otherwise negative    Troponin 0.70 in setting of ESRD, 0.25 back in 09/2022  - Troponin stable between 0.7-0.8  - TTE LVEF 45-50%, Grade 3 diastolic dysfunction  NST:  NO EVIDENCE FOR ISCHEMIA DURING LEXISCAN INFUSION. FIXED PERFUSION   DEFECT INVOLVING ANTERIOR WALL OF THE LEFT VENTRICLE EXTENDING TO APEX   CONSISTENT WITH SCAR. GLOBAL HYPOKINESIS WITH POOR THICKENING OF THE ANTERIOR WALL. LEFT VENTRICULAR EJECTION FRACTION OF  36 % WHICH IS LOW.  - c/w ASA 81 mg daily- c/w Plavix 75 mg daily- c/w atorvastatin 40 mg daily- c/w Nifedipine 30 mg daily  EKG: Left anterior fascicular block ST & T wave abnormality, consider lateral ischemia. Prolonged QT      Pt scheduled for Left foot DFU debridement today.     Overnight events none    Subjective complaints none    Present Today:   - Lacey:  No [  ], Yes [   ] : Indication:     - Type of IV Access:       .. CVC/Piccline:  No [  ], Yes [   ] : Indication:       .. Midline: No [  ], Yes [   ] : Indication:                                             ----------------------------------------------------------  OBJECTIVE  PAST MEDICAL & SURGICAL HISTORY  CAD (coronary artery disease)  1 stent 2008    S/P CABG (coronary artery bypass graft)  x4    Diabetes mellitus    Transient ischemic attack (TIA)  2017; 2008    Chronic kidney disease (CKD)  Stage IV    Hypertension    Stented coronary artery  in 2008    Myocardial infarction  2012    PAD (peripheral artery disease)  S/p bypass left leg    HLD (hyperlipidemia)    BPH (benign prostatic hyperplasia)    Pain in left knee  s/p fall    OA (osteoarthritis)    [place] (hard of hearing)    Chronic anemia    S/P CABG (coronary artery bypass graft)  2012    H/O arterial bypass of lower limb  Left Lower Extremity (2016)    History of surgery  Left CEA (2017)  Left Pinkie toe Amputation (2014)  CABG x 4 (2012)  Card cath - stent (2008)      AV fistula  2019  LEFT AV FISTULA                                              -----------------------------------------------------------  ALLERGIES:  No Known Allergies                                            ------------------------------------------------------------    HOME MEDICATIONS  Home Medications:  aspirin 81 mg oral tablet: 1 tab(s) orally once a day (14 Sep 2022 17:42)  cefepime 2 g intravenous injection: 2 granules intravenous Monday, Wednesday, and Friday post dialysis (14 Nov 2022 08:01)  DAPTOmycin 500 mg intravenous injection: 900 milligram(s) intravenous Monday, Wednesday, and Friday post dialysis (14 Nov 2022 08:02)  furosemide 40 mg oral tablet: 1 tab(s) orally once a day (14 Nov 2022 07:59)  Levemir 100 units/mL subcutaneous solution: 40 unit(s) subcutaneous once a day (at bedtime) (14 Sep 2022 17:42)  NIFEdipine 30 mg oral tablet, extended release: 1 tab(s) orally once a day (07 Nov 2022 13:45)  nitroglycerin 0.4 mg sublingual spray: 1 tab(s) sublingual every 5 minutes, As Needed up to 3 tabs (14 Nov 2022 08:05)  Plavix 75 mg oral tablet: 1 tab(s) orally once a day (14 Sep 2022 17:42)  Senna 8.6 mg oral tablet: 2 tab(s) orally once a day (at bedtime) (14 Nov 2022 08:03)  sevelamer carbonate 800 mg oral tablet: 1 tab(s) orally 3 times a day (with meals) (20 Sep 2022 11:36)  tamsulosin 0.4 mg oral capsule: 1 cap(s) orally once a day (at bedtime) (07 Nov 2022 13:45)  Trulicity Pen 1.5 mg/0.5 mL subcutaneous solution: 1.5  subcutaneous once a week (14 Sep 2022 17:42)  Tylenol 325 mg oral tablet: 2 tab(s) orally every 6 hours, As Needed (14 Nov 2022 08:03)                           MEDICATIONS:  STANDING MEDICATIONS  aspirin enteric coated 81 milliGRAM(s) Oral daily  atorvastatin 40 milliGRAM(s) Oral at bedtime  cefepime   IVPB 2000 milliGRAM(s) IV Intermittent <User Schedule>  clopidogrel Tablet 75 milliGRAM(s) Oral daily  cyanocobalamin 1000 MICROGram(s) Oral daily  darbepoetin Injectable Syringe 60 MICROGram(s) IV Push <User Schedule>  famotidine    Tablet 20 milliGRAM(s) Oral daily  folic acid 1 milliGRAM(s) Oral daily  furosemide    Tablet 40 milliGRAM(s) Oral daily  heparin   Injectable 5000 Unit(s) SubCutaneous every 8 hours  influenza   Vaccine 0.5 milliLiter(s) IntraMuscular once  insulin glargine Injectable (LANTUS) 20 Unit(s) SubCutaneous at bedtime  insulin lispro (ADMELOG) corrective regimen sliding scale   SubCutaneous three times a day before meals  insulin lispro Injectable (ADMELOG) 5 Unit(s) SubCutaneous before breakfast  insulin lispro Injectable (ADMELOG) 5 Unit(s) SubCutaneous before lunch  metoprolol tartrate 12.5 milliGRAM(s) Oral every 12 hours  NIFEdipine XL 30 milliGRAM(s) Oral daily  regadenoson Injectable 0.4 milliGRAM(s) IV Push once  senna 2 Tablet(s) Oral at bedtime  sevelamer carbonate 800 milliGRAM(s) Oral three times a day with meals  tamsulosin 0.4 milliGRAM(s) Oral at bedtime    PRN MEDICATIONS  acetaminophen     Tablet .. 650 milliGRAM(s) Oral every 6 hours PRN  melatonin 3 milliGRAM(s) Oral at bedtime PRN                                            ------------------------------------------------------------  VITAL SIGNS: Last 24 Hours  T(C): 36.1 (21 Nov 2022 00:00), Max: 36.1 (20 Nov 2022 09:19)  T(F): 96.9 (21 Nov 2022 00:00), Max: 97 (20 Nov 2022 09:19)  HR: 77 (21 Nov 2022 08:10) (77 - 97)  BP: 153/99 (21 Nov 2022 08:10) (133/62 - 158/74)  BP(mean): 104 (20 Nov 2022 17:38) (104 - 104)  RR: 18 (21 Nov 2022 05:16) (18 - 19)  SpO2: --                                             --------------------------------------------------------------  LABS:                        7.2    8.24  )-----------( 263      ( 21 Nov 2022 07:41 )             22.3     11-20    137  |  96<L>  |  43<H>  ----------------------------<  145<H>  4.9   |  28  |  6.0<HH>    Ca    8.3<L>      20 Nov 2022 04:30  Mg     2.1     11-20    TPro  7.3  /  Alb  3.1<L>  /  TBili  0.3  /  DBili  x   /  AST  7   /  ALT  <5  /  AlkPhos  181<H>  11-20                                                              -------------------------------------------------------------  RADIOLOGY:                                            --------------------------------------------------------------    PHYSICAL EXAM:    PHYSICAL EXAM:  GENERAL: NAD, well-groomed, well-developed  HEAD:  Atraumatic, Normocephalic  EYES: EOMI, PERRLA, conjunctiva and sclera clear  ENMT: Moist mucous membranes  HEART: Regular rate and rhythm; No murmurs, rubs, or gallops  RESPIRATORY: clear breath sound b/l. no wheezes, crackles or rhonchi  ABDOMEN: Soft, Nontender, Nondistended; Bowel sounds present  NEUROLOGY: A&Ox3, nonfocal, moving all extremities  EXTREMITIES:  No clubbing, cyanosis, or edema, bandages on DFU are clean.   SKIN: warm, dry, normal color                                               --------------------------------------------------------------    ASSESSMENT & PLAN    Past medical history and hospital course     63 yo male, h/o type 2 DM on insulin, CAD s.p stent 2008, s/p CABG 2012 (Dr Smith), PAD s/p angioplasty and stent b/l LE, BPH, ESRD on HD through left AVF (MWF follows with Dr MARY Paula), DFU sp left toe amputation, left calcaneal OM on daptomycin and cefepime post dialysis s.p wound Vac, chronic anemia, HFmrEF, presented to the hospital for chest pain     # ACS/CAD presenting with Chest pain  #CAD s/p PCI/CABG  #PAD s/p angioplasty  #HTN/HLD  #Chronic HFmrEF  Troponin 0.70 in setting of ESRD, 0.25 back in 09/2022  - Troponin stable between 0.7-0.8  - TTE LVEF 45-50%, Grade 3 diastolic dysfunction  NST: FIXED PERFUSION DEFECT INVOLVING ANTERIOR WALL OF THE LEFT VENTRICLE EXTENDING TO APEX CONSISTENT WITH SCAR. GLOBAL HYPOKINESIS WITH POOR THICKENING OF THE ANTERIOR WALL. EF  36 %   - c/w ASA 81 mg daily  - c/w Plavix 75 mg daily  - c/w atorvastatin 40 mg daily  - c/w Nifedipine 30 mg daily  EKG: Left anterior fascicular block ST & T wave abnormality, consider lateral ischemia. Prolonged QT      #NSVT  -10 beats of NSVT observed on tele on 11/16 PM  -Started Metoprolol 12.5 BID  -Maintain K>4 and Mg>2    # Anemia of chronic disease  - hb on admission 6.6 baseline 7.6. s/p 1U PRBC  -11/21 hb 7.2  - Monitor CBC, transfuse to keep >8    # ESRD on HD   - HD per nephrology  - B/L pitting edema, little urine output    #Recent hx Left calcaneal OM   - Wound Cx 9/16 - Enterobacter cloacae  - wound CX 10/16 VRE Faecium Enterobacter cloacae complex, Proteus vulgaris, Morganella morganii   - s/p debridement 10/21 Wound Cx Proteus mirabilis, VRE faecium, Pseudomonas putida  - s/p debridement 10/24 Crumble L calcaneous   - s/p debridement 11/3 with wound vac in place  - Cefepime 2g post HD through 11/30; s/p 3 session of daptomycin post-HD  - On last admission, ID plan for 4 weeks from last debridement (11/3-11/30)    # PAD  - s/p angiogram LLE with peroneal artery angioplasty and arterectomy 10/27  - Cont meds as above    # Type 2 DM  Continue insulin regimen    # BPH  - c/w Tamsulosin 0.4 mg daily    Pending: Podiatry procedure  -DVT prophylaxis: Heparin  -GI prophylaxis: Not indicated  -Diet: Renal, 1.2l fluid restriction  -Code status: Full code                                                                                                        ----------------------------------------------------  # DVT prophylaxis     # GI prophylaxis     # Diet     # Activity Score (AM-PAC)    # Code status     # Disposition                                                                              --------------------------------------------------------    Baldev Rivero      LAWRENCE SCHWABACHER 62y Male  MRN#: [id number]   CODE STATUS:________    Hospital Day: 7d    Pt is currently admitted with the primary diagnosis of  chest pain     SUBJECTIVE  Hospital Course  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 and cefepime post dialysis s.p wound Vac, chronic anemia, HFmrEF, presented to the hospital for chest pain   Pain is mid sternal, pressure like, discomfort rather than pain, occurring at rest, non radiating, exacerbated by cough, not similar to previous episodes of chest pain, not associated with tremor , diaphoresis, numbness, lightheadedness; Pain has improved with tylenol initially and then resolved with nitroglycerin SL   Patient denies fever, chills, shortness of breath, sputum, abdominal pain; ROS otherwise negative    Troponin 0.70 in setting of ESRD, 0.25 back in 09/2022  - Troponin stable between 0.7-0.8  - TTE LVEF 45-50%, Grade 3 diastolic dysfunction  NST:  NO EVIDENCE FOR ISCHEMIA DURING LEXISCAN INFUSION. FIXED PERFUSION   DEFECT INVOLVING ANTERIOR WALL OF THE LEFT VENTRICLE EXTENDING TO APEX   CONSISTENT WITH SCAR. GLOBAL HYPOKINESIS WITH POOR THICKENING OF THE ANTERIOR WALL. LEFT VENTRICULAR EJECTION FRACTION OF  36 % WHICH IS LOW.  - c/w ASA 81 mg daily- c/w Plavix 75 mg daily- c/w atorvastatin 40 mg daily- c/w Nifedipine 30 mg daily  EKG: Left anterior fascicular block ST & T wave abnormality, consider lateral ischemia. Prolonged QT      Pt scheduled for Left foot DFU debridement today.     Overnight events none    Subjective complaints none    Present Today:   - Lacey:  No [  ], Yes [   ] : Indication:     - Type of IV Access:       .. CVC/Piccline:  No [  ], Yes [   ] : Indication:       .. Midline: No [  ], Yes [   ] : Indication:                                             ----------------------------------------------------------  OBJECTIVE  PAST MEDICAL & SURGICAL HISTORY  CAD (coronary artery disease)  1 stent 2008    S/P CABG (coronary artery bypass graft)  x4    Diabetes mellitus    Transient ischemic attack (TIA)  2017; 2008    Chronic kidney disease (CKD)  Stage IV    Hypertension    Stented coronary artery  in 2008    Myocardial infarction  2012    PAD (peripheral artery disease)  S/p bypass left leg    HLD (hyperlipidemia)    BPH (benign prostatic hyperplasia)    Pain in left knee  s/p fall    OA (osteoarthritis)    [place] (hard of hearing)    Chronic anemia    S/P CABG (coronary artery bypass graft)  2012    H/O arterial bypass of lower limb  Left Lower Extremity (2016)    History of surgery  Left CEA (2017)  Left Pinkie toe Amputation (2014)  CABG x 4 (2012)  Card cath - stent (2008)      AV fistula  2019  LEFT AV FISTULA                                              -----------------------------------------------------------  ALLERGIES:  No Known Allergies                                            ------------------------------------------------------------    HOME MEDICATIONS  Home Medications:  aspirin 81 mg oral tablet: 1 tab(s) orally once a day (14 Sep 2022 17:42)  cefepime 2 g intravenous injection: 2 granules intravenous Monday, Wednesday, and Friday post dialysis (14 Nov 2022 08:01)  DAPTOmycin 500 mg intravenous injection: 900 milligram(s) intravenous Monday, Wednesday, and Friday post dialysis (14 Nov 2022 08:02)  furosemide 40 mg oral tablet: 1 tab(s) orally once a day (14 Nov 2022 07:59)  Levemir 100 units/mL subcutaneous solution: 40 unit(s) subcutaneous once a day (at bedtime) (14 Sep 2022 17:42)  NIFEdipine 30 mg oral tablet, extended release: 1 tab(s) orally once a day (07 Nov 2022 13:45)  nitroglycerin 0.4 mg sublingual spray: 1 tab(s) sublingual every 5 minutes, As Needed up to 3 tabs (14 Nov 2022 08:05)  Plavix 75 mg oral tablet: 1 tab(s) orally once a day (14 Sep 2022 17:42)  Senna 8.6 mg oral tablet: 2 tab(s) orally once a day (at bedtime) (14 Nov 2022 08:03)  sevelamer carbonate 800 mg oral tablet: 1 tab(s) orally 3 times a day (with meals) (20 Sep 2022 11:36)  tamsulosin 0.4 mg oral capsule: 1 cap(s) orally once a day (at bedtime) (07 Nov 2022 13:45)  Trulicity Pen 1.5 mg/0.5 mL subcutaneous solution: 1.5  subcutaneous once a week (14 Sep 2022 17:42)  Tylenol 325 mg oral tablet: 2 tab(s) orally every 6 hours, As Needed (14 Nov 2022 08:03)                           MEDICATIONS:  STANDING MEDICATIONS  aspirin enteric coated 81 milliGRAM(s) Oral daily  atorvastatin 40 milliGRAM(s) Oral at bedtime  cefepime   IVPB 2000 milliGRAM(s) IV Intermittent <User Schedule>  clopidogrel Tablet 75 milliGRAM(s) Oral daily  cyanocobalamin 1000 MICROGram(s) Oral daily  darbepoetin Injectable Syringe 60 MICROGram(s) IV Push <User Schedule>  famotidine    Tablet 20 milliGRAM(s) Oral daily  folic acid 1 milliGRAM(s) Oral daily  furosemide    Tablet 40 milliGRAM(s) Oral daily  heparin   Injectable 5000 Unit(s) SubCutaneous every 8 hours  influenza   Vaccine 0.5 milliLiter(s) IntraMuscular once  insulin glargine Injectable (LANTUS) 20 Unit(s) SubCutaneous at bedtime  insulin lispro (ADMELOG) corrective regimen sliding scale   SubCutaneous three times a day before meals  insulin lispro Injectable (ADMELOG) 5 Unit(s) SubCutaneous before breakfast  insulin lispro Injectable (ADMELOG) 5 Unit(s) SubCutaneous before lunch  metoprolol tartrate 12.5 milliGRAM(s) Oral every 12 hours  NIFEdipine XL 30 milliGRAM(s) Oral daily  regadenoson Injectable 0.4 milliGRAM(s) IV Push once  senna 2 Tablet(s) Oral at bedtime  sevelamer carbonate 800 milliGRAM(s) Oral three times a day with meals  tamsulosin 0.4 milliGRAM(s) Oral at bedtime    PRN MEDICATIONS  acetaminophen     Tablet .. 650 milliGRAM(s) Oral every 6 hours PRN  melatonin 3 milliGRAM(s) Oral at bedtime PRN                                            ------------------------------------------------------------  VITAL SIGNS: Last 24 Hours  T(C): 36.1 (21 Nov 2022 00:00), Max: 36.1 (20 Nov 2022 09:19)  T(F): 96.9 (21 Nov 2022 00:00), Max: 97 (20 Nov 2022 09:19)  HR: 77 (21 Nov 2022 08:10) (77 - 97)  BP: 153/99 (21 Nov 2022 08:10) (133/62 - 158/74)  BP(mean): 104 (20 Nov 2022 17:38) (104 - 104)  RR: 18 (21 Nov 2022 05:16) (18 - 19)  SpO2: --                                             --------------------------------------------------------------  LABS:                        7.2    8.24  )-----------( 263      ( 21 Nov 2022 07:41 )             22.3     11-20    137  |  96<L>  |  43<H>  ----------------------------<  145<H>  4.9   |  28  |  6.0<HH>    Ca    8.3<L>      20 Nov 2022 04:30  Mg     2.1     11-20    TPro  7.3  /  Alb  3.1<L>  /  TBili  0.3  /  DBili  x   /  AST  7   /  ALT  <5  /  AlkPhos  181<H>  11-20                                                              -------------------------------------------------------------  RADIOLOGY:                                            --------------------------------------------------------------    PHYSICAL EXAM:    PHYSICAL EXAM:  GENERAL: NAD, well-groomed, well-developed  HEAD:  Atraumatic, Normocephalic  EYES: EOMI, PERRLA, conjunctiva and sclera clear  ENMT: Moist mucous membranes  HEART: Regular rate and rhythm; No murmurs, rubs, or gallops  RESPIRATORY: clear breath sound b/l. no wheezes, crackles or rhonchi  ABDOMEN: Soft, Nontender, Nondistended; Bowel sounds present  NEUROLOGY: A&Ox3, nonfocal, moving all extremities  EXTREMITIES:  No clubbing, cyanosis, or edema, bandages on DFU are clean.   SKIN: warm, dry, normal color                                               --------------------------------------------------------------    ASSESSMENT & PLAN    Past medical history and hospital course     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 and cefepime post dialysis s.p wound Vac, chronic anemia, HFmrEF, presented to the hospital for chest pain     # ACS/CAD presenting with Chest pain  #CAD s/p PCI/CABG  #PAD s/p angioplasty  #HTN/HLD  #Chronic HFmrEF  Troponin 0.70 in setting of ESRD, 0.25 back in 09/2022  - Troponin stable between 0.7-0.8  - TTE LVEF 45-50%, Grade 3 diastolic dysfunction  NST: FIXED PERFUSION DEFECT INVOLVING ANTERIOR WALL OF THE LEFT VENTRICLE EXTENDING TO APEX CONSISTENT WITH SCAR. GLOBAL HYPOKINESIS WITH POOR THICKENING OF THE ANTERIOR WALL. EF  36 %   - c/w ASA 81 mg daily  - c/w Plavix 75 mg daily  - c/w atorvastatin 40 mg daily  - c/w Nifedipine 30 mg daily  EKG: Left anterior fascicular block ST & T wave abnormality, consider lateral ischemia. Prolonged QT      #NSVT  -10 beats of NSVT observed on tele on 11/16 PM  -Started Metoprolol 12.5 BID  -Maintain K>4 and Mg>2    # Anemia of chronic disease  - hb on admission 6.6 baseline 7.6. s/p 1U PRBC  -11/21 hb 7.2, will transfuse 1 unit after T&S comes back   - Monitor CBC, transfuse to keep >8    # ESRD on HD   - HD per nephrology  - B/L pitting edema, little urine output    #Recent hx Left calcaneal OM   - Wound Cx 9/16 - Enterobacter cloacae  - wound CX 10/16 VRE Faecium Enterobacter cloacae complex, Proteus vulgaris, Morganella morganii   - s/p debridement 10/21 Wound Cx Proteus mirabilis, VRE faecium, Pseudomonas putida  - s/p debridement 10/24 Crumble L calcaneous   - s/p debridement 11/3 with wound vac in place  - Cefepime 2g post HD through 11/30; s/p 3 session of daptomycin post-HD  - On last admission, ID plan for 4 weeks from last debridement (11/3-11/30)  -Debridement 11/21 PM     # PAD  - s/p angiogram LLE with peroneal artery angioplasty and arterectomy 10/27  - Cont meds as above    # Type 2 DM  Continue insulin regimen    # BPH  - c/w Tamsulosin 0.4 mg daily    Pending: Podiatry procedure  -DVT prophylaxis: Heparin  -GI prophylaxis: Not indicated  -Diet: Renal, 1.2l fluid restriction  -Code status: Full code                                                                                                        ----------------------------------------------------  # DVT prophylaxis     # GI prophylaxis     # Diet     # Activity Score (AM-PAC)    # Code status     # Disposition                                                                              --------------------------------------------------------    Baldev Rivero

## 2022-11-22 LAB
ALBUMIN SERPL ELPH-MCNC: 3.3 G/DL — LOW (ref 3.5–5.2)
ALP SERPL-CCNC: 198 U/L — HIGH (ref 30–115)
ALT FLD-CCNC: <5 U/L — SIGNIFICANT CHANGE UP (ref 0–41)
ANION GAP SERPL CALC-SCNC: 18 MMOL/L — HIGH (ref 7–14)
AST SERPL-CCNC: 7 U/L — SIGNIFICANT CHANGE UP (ref 0–41)
BASOPHILS # BLD AUTO: 0.1 K/UL — SIGNIFICANT CHANGE UP (ref 0–0.2)
BASOPHILS NFR BLD AUTO: 1.2 % — HIGH (ref 0–1)
BILIRUB SERPL-MCNC: 0.3 MG/DL — SIGNIFICANT CHANGE UP (ref 0.2–1.2)
BUN SERPL-MCNC: 39 MG/DL — HIGH (ref 10–20)
CALCIUM SERPL-MCNC: 8.5 MG/DL — SIGNIFICANT CHANGE UP (ref 8.4–10.5)
CHLORIDE SERPL-SCNC: 102 MMOL/L — SIGNIFICANT CHANGE UP (ref 98–110)
CO2 SERPL-SCNC: 24 MMOL/L — SIGNIFICANT CHANGE UP (ref 17–32)
CREAT SERPL-MCNC: 5.2 MG/DL — CRITICAL HIGH (ref 0.7–1.5)
EGFR: 12 ML/MIN/1.73M2 — LOW
EOSINOPHIL # BLD AUTO: 0.19 K/UL — SIGNIFICANT CHANGE UP (ref 0–0.7)
EOSINOPHIL NFR BLD AUTO: 2.3 % — SIGNIFICANT CHANGE UP (ref 0–8)
GLUCOSE BLDC GLUCOMTR-MCNC: 107 MG/DL — HIGH (ref 70–99)
GLUCOSE BLDC GLUCOMTR-MCNC: 124 MG/DL — HIGH (ref 70–99)
GLUCOSE BLDC GLUCOMTR-MCNC: 173 MG/DL — HIGH (ref 70–99)
GLUCOSE BLDC GLUCOMTR-MCNC: 277 MG/DL — HIGH (ref 70–99)
GLUCOSE BLDC GLUCOMTR-MCNC: 55 MG/DL — LOW (ref 70–99)
GLUCOSE SERPL-MCNC: 54 MG/DL — CRITICAL LOW (ref 70–99)
HCT VFR BLD CALC: 25 % — LOW (ref 42–52)
HGB BLD-MCNC: 8.3 G/DL — LOW (ref 14–18)
IMM GRANULOCYTES NFR BLD AUTO: 0.7 % — HIGH (ref 0.1–0.3)
LYMPHOCYTES # BLD AUTO: 0.93 K/UL — LOW (ref 1.2–3.4)
LYMPHOCYTES # BLD AUTO: 11.3 % — LOW (ref 20.5–51.1)
MAGNESIUM SERPL-MCNC: 2.3 MG/DL — SIGNIFICANT CHANGE UP (ref 1.8–2.4)
MCHC RBC-ENTMCNC: 30.4 PG — SIGNIFICANT CHANGE UP (ref 27–31)
MCHC RBC-ENTMCNC: 33.2 G/DL — SIGNIFICANT CHANGE UP (ref 32–37)
MCV RBC AUTO: 91.6 FL — SIGNIFICANT CHANGE UP (ref 80–94)
MONOCYTES # BLD AUTO: 0.7 K/UL — HIGH (ref 0.1–0.6)
MONOCYTES NFR BLD AUTO: 8.5 % — SIGNIFICANT CHANGE UP (ref 1.7–9.3)
NEUTROPHILS # BLD AUTO: 6.26 K/UL — SIGNIFICANT CHANGE UP (ref 1.4–6.5)
NEUTROPHILS NFR BLD AUTO: 76 % — HIGH (ref 42.2–75.2)
NRBC # BLD: 0 /100 WBCS — SIGNIFICANT CHANGE UP (ref 0–0)
PLATELET # BLD AUTO: 286 K/UL — SIGNIFICANT CHANGE UP (ref 130–400)
POTASSIUM SERPL-MCNC: 4.5 MMOL/L — SIGNIFICANT CHANGE UP (ref 3.5–5)
POTASSIUM SERPL-SCNC: 4.5 MMOL/L — SIGNIFICANT CHANGE UP (ref 3.5–5)
PROT SERPL-MCNC: 7.6 G/DL — SIGNIFICANT CHANGE UP (ref 6–8)
RBC # BLD: 2.73 M/UL — LOW (ref 4.7–6.1)
RBC # FLD: 15.6 % — HIGH (ref 11.5–14.5)
SODIUM SERPL-SCNC: 144 MMOL/L — SIGNIFICANT CHANGE UP (ref 135–146)
WBC # BLD: 8.24 K/UL — SIGNIFICANT CHANGE UP (ref 4.8–10.8)
WBC # FLD AUTO: 8.24 K/UL — SIGNIFICANT CHANGE UP (ref 4.8–10.8)

## 2022-11-22 PROCEDURE — 99232 SBSQ HOSP IP/OBS MODERATE 35: CPT

## 2022-11-22 RX ORDER — INSULIN LISPRO 100/ML
5 VIAL (ML) SUBCUTANEOUS
Refills: 0 | Status: DISCONTINUED | OUTPATIENT
Start: 2022-11-22 | End: 2022-12-08

## 2022-11-22 RX ORDER — DEXTROSE 50 % IN WATER 50 %
12.5 SYRINGE (ML) INTRAVENOUS ONCE
Refills: 0 | Status: DISCONTINUED | OUTPATIENT
Start: 2022-11-22 | End: 2022-12-08

## 2022-11-22 RX ORDER — DEXTROSE 50 % IN WATER 50 %
25 SYRINGE (ML) INTRAVENOUS ONCE
Refills: 0 | Status: DISCONTINUED | OUTPATIENT
Start: 2022-11-22 | End: 2022-12-08

## 2022-11-22 RX ORDER — SODIUM CHLORIDE 9 MG/ML
1000 INJECTION, SOLUTION INTRAVENOUS
Refills: 0 | Status: DISCONTINUED | OUTPATIENT
Start: 2022-11-22 | End: 2022-12-08

## 2022-11-22 RX ORDER — INSULIN LISPRO 100/ML
VIAL (ML) SUBCUTANEOUS
Refills: 0 | Status: DISCONTINUED | OUTPATIENT
Start: 2022-11-22 | End: 2022-12-08

## 2022-11-22 RX ORDER — DEXTROSE 50 % IN WATER 50 %
15 SYRINGE (ML) INTRAVENOUS ONCE
Refills: 0 | Status: DISCONTINUED | OUTPATIENT
Start: 2022-11-22 | End: 2022-12-08

## 2022-11-22 RX ORDER — INSULIN GLARGINE 100 [IU]/ML
13 INJECTION, SOLUTION SUBCUTANEOUS AT BEDTIME
Refills: 0 | Status: DISCONTINUED | OUTPATIENT
Start: 2022-11-22 | End: 2022-12-03

## 2022-11-22 RX ORDER — GLUCAGON INJECTION, SOLUTION 0.5 MG/.1ML
1 INJECTION, SOLUTION SUBCUTANEOUS ONCE
Refills: 0 | Status: DISCONTINUED | OUTPATIENT
Start: 2022-11-22 | End: 2022-12-08

## 2022-11-22 RX ADMIN — Medication 81 MILLIGRAM(S): at 11:56

## 2022-11-22 RX ADMIN — Medication 12.5 MILLIGRAM(S): at 05:03

## 2022-11-22 RX ADMIN — HEPARIN SODIUM 5000 UNIT(S): 5000 INJECTION INTRAVENOUS; SUBCUTANEOUS at 15:31

## 2022-11-22 RX ADMIN — Medication 12.5 MILLIGRAM(S): at 17:16

## 2022-11-22 RX ADMIN — Medication 1 MILLIGRAM(S): at 11:56

## 2022-11-22 RX ADMIN — CLOPIDOGREL BISULFATE 75 MILLIGRAM(S): 75 TABLET, FILM COATED ORAL at 11:55

## 2022-11-22 RX ADMIN — HEPARIN SODIUM 5000 UNIT(S): 5000 INJECTION INTRAVENOUS; SUBCUTANEOUS at 22:00

## 2022-11-22 RX ADMIN — PREGABALIN 1000 MICROGRAM(S): 225 CAPSULE ORAL at 11:56

## 2022-11-22 RX ADMIN — FAMOTIDINE 20 MILLIGRAM(S): 10 INJECTION INTRAVENOUS at 11:55

## 2022-11-22 RX ADMIN — SEVELAMER CARBONATE 800 MILLIGRAM(S): 2400 POWDER, FOR SUSPENSION ORAL at 17:16

## 2022-11-22 RX ADMIN — HEPARIN SODIUM 5000 UNIT(S): 5000 INJECTION INTRAVENOUS; SUBCUTANEOUS at 05:03

## 2022-11-22 RX ADMIN — SEVELAMER CARBONATE 800 MILLIGRAM(S): 2400 POWDER, FOR SUSPENSION ORAL at 08:37

## 2022-11-22 RX ADMIN — Medication 3: at 17:21

## 2022-11-22 RX ADMIN — INSULIN GLARGINE 13 UNIT(S): 100 INJECTION, SOLUTION SUBCUTANEOUS at 21:59

## 2022-11-22 RX ADMIN — SEVELAMER CARBONATE 800 MILLIGRAM(S): 2400 POWDER, FOR SUSPENSION ORAL at 11:55

## 2022-11-22 RX ADMIN — TAMSULOSIN HYDROCHLORIDE 0.4 MILLIGRAM(S): 0.4 CAPSULE ORAL at 21:59

## 2022-11-22 RX ADMIN — Medication 30 MILLIGRAM(S): at 05:03

## 2022-11-22 RX ADMIN — Medication 40 MILLIGRAM(S): at 05:02

## 2022-11-22 RX ADMIN — ATORVASTATIN CALCIUM 40 MILLIGRAM(S): 80 TABLET, FILM COATED ORAL at 21:59

## 2022-11-22 NOTE — PROGRESS NOTE ADULT - ASSESSMENT
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 and cefepime post dialysis s.p wound Vac, chronic anemia, HFmrEF, presented to the hospital for chest pain. Found to have anemia s/p 1 unit of prbc.    ESRD on HD/ Anemia / Chest pain/ OM on abx  HD yesterday  RAMSEY weekly post HD 60 mcg/ transfuse if Hb< 7  on sevelamer 800 mg po tid/ ph at goal / repeat   BP controlled  abx for OM - Cefepime  s/pdebridement with podiatry 11/21/cardiology f/up noted   Fluid restriction   will follow

## 2022-11-22 NOTE — PROGRESS NOTE ADULT - SUBJECTIVE AND OBJECTIVE BOX
SCHWABACHER, LAWRENCE  62y  [facility] [id number] A      Patient is a 62y old  Male who presents with a chief complaint of Chest pain (21 Nov 2022 09:13)      INTERVAL HPI/OVERNIGHT EVENTS:  Patient feels well.He;s upset about the delay in the surgery   he already spoke to Podiatry who stated they won't be able to do it until fFriday       REVIEW OF SYSTEMS:        FAMILY HISTORY:  Family history of heart disease (Father)    DM (diabetes mellitus) (Mother)    ESRD (end stage renal disease) on dialysis (Mother)      T(C): 36.1 (11-21-22 @ 13:53), Max: 36.1 (11-21-22 @ 00:00)  HR: 87 (11-21-22 @ 13:53) (77 - 97)  BP: 134/72 (11-21-22 @ 13:53) (134/72 - 158/74)  RR: 18 (11-21-22 @ 13:53) (18 - 19)  SpO2: 97% (11-21-22 @ 13:53) (97% - 97%)  Wt(kg): --Vital Signs Last 24 Hrs  T(C): 36.1 (21 Nov 2022 13:53), Max: 36.1 (21 Nov 2022 00:00)  T(F): 96.9 (21 Nov 2022 00:00), Max: 96.9 (21 Nov 2022 00:00)  HR: 87 (21 Nov 2022 13:53) (77 - 97)  BP: 134/72 (21 Nov 2022 13:53) (134/72 - 158/74)  BP(mean): 104 (21 Nov 2022 13:53) (104 - 104)  RR: 18 (21 Nov 2022 13:53) (18 - 19)  SpO2: 97% (21 Nov 2022 13:53) (97% - 97%)    Parameters below as of 21 Nov 2022 13:53    O2 Flow (L/min): 2      PHYSICAL EXAM:  GENERAL: NAD, well-groomed, well-developed  NERVOUS SYSTEM:  Alert & Oriented X3,   PULM:Faint crackles   CARDIAC: Regular rate and rhythm  GI: Soft, Nontender, Nondistended; Bowel sounds present  EXTREMITIES:  2+ Peripheral Pulse, left leg covered, chronic changes noted     Consultant(s) Notes Reviewed:  [x ] YES  [ ] NO  Care Discussed with Consultants/Other Providers [ x] YES  [ ] NO    LABS:                            7.2    8.24  )-----------( 263      ( 21 Nov 2022 07:41 )             22.3   11-21    133<L>  |  95<L>  |  54<H>  ----------------------------<  86  4.7   |  26  |  6.8<HH>    Ca    8.4      21 Nov 2022 07:41  Mg     2.1     11-21    TPro  7.3  /  Alb  3.1<L>  /  TBili  0.3  /  DBili  x   /  AST  7   /  ALT  <5  /  AlkPhos  195<H>  11-21            acetaminophen     Tablet .. 650 milliGRAM(s) Oral every 6 hours PRN  aspirin enteric coated 81 milliGRAM(s) Oral daily  atorvastatin 40 milliGRAM(s) Oral at bedtime  cefepime   IVPB 2000 milliGRAM(s) IV Intermittent <User Schedule>  clopidogrel Tablet 75 milliGRAM(s) Oral daily  cyanocobalamin 1000 MICROGram(s) Oral daily  darbepoetin Injectable Syringe 60 MICROGram(s) IV Push <User Schedule>  famotidine    Tablet 20 milliGRAM(s) Oral daily  folic acid 1 milliGRAM(s) Oral daily  furosemide    Tablet 40 milliGRAM(s) Oral daily  heparin   Injectable 5000 Unit(s) SubCutaneous every 8 hours  influenza   Vaccine 0.5 milliLiter(s) IntraMuscular once  insulin glargine Injectable (LANTUS) 20 Unit(s) SubCutaneous at bedtime  insulin lispro (ADMELOG) corrective regimen sliding scale   SubCutaneous three times a day before meals  insulin lispro Injectable (ADMELOG) 5 Unit(s) SubCutaneous before breakfast  insulin lispro Injectable (ADMELOG) 5 Unit(s) SubCutaneous before lunch  melatonin 3 milliGRAM(s) Oral at bedtime PRN  metoprolol tartrate 12.5 milliGRAM(s) Oral every 12 hours  NIFEdipine XL 30 milliGRAM(s) Oral daily  regadenoson Injectable 0.4 milliGRAM(s) IV Push once  senna 2 Tablet(s) Oral at bedtime  sevelamer carbonate 800 milliGRAM(s) Oral three times a day with meals  tamsulosin 0.4 milliGRAM(s) Oral at bedtime      63 yo male, h/o type 2 DM on insulin, CAD s.p stent 2008, s/p CABG 2012 (Dr Smith), PAD s/p angioplasty and stent b/l LE, BPH, ESRD on HD through left AVF (MWF follows with Dr MARY Paula), DFU sp left toe amputation, left calcaneal OM on daptomycin and cefepime post dialysis s.p wound Vac, chronic anemia, HFmrEF, presented to the hospital for chest pain     1. Chest pain. hx of CAD s/p PCI/CABG and PAD s/p angioplasty  ACS/CAD presenting with Chest pain  - Admit to medicine  - Cont aspirin and plavix   - Cont statin 40mg HS   - Cont Nifedipine 30mg po daily   - Troponin stable between 0.7-0.8  NST: FIXED PERFUSION DEFECT INVOLVING ANTERIOR WALL OF THE LEFT VENTRICLE EXTENDING TO APEX CONSISTENT WITH SCAR. GLOBAL HYPOKINESIS WITH POOR THICKENING OF THE ANTERIOR WALL. EF  36 %       2. NSVT  -10 beats of NSVT observed on tele on 11/16 PM  -Started Metoprolol 12.5 BID  -Maintain K>4 and Mg>2    3. Anemia of chronic disease s/p 1 PRBC   - Monitor CBC, transfuse to keep >8    4.  ESRD on HD   - HD per nephrology  - B/L pitting edema, little urine output    5. Recent hx Left calcaneal OM   - Wound Cx 9/16 - Enterobacter cloacae  - wound CX 10/16 VRE Faecium Enterobacter cloacae complex, Proteus vulgaris, Morganella morganii   - s/p debridement 10/21 Wound Cx Proteus mirabilis, VRE faecium, Pseudomonas putida  - s/p debridement 10/24 Crumble L calcaneous   - s/p debridement 11/3 with wound vac in place  - Cefepime 2g post HD through 11/30; s/p 3 session of daptomycin post-HD  - On last admission, ID plan for 4 weeks from last debridement (11/3-11/30)  -Debridement postpone till Friday 11/25/2022     6.  PAD  - s/p angiogram LLE with peroneal artery angioplasty and arterectomy 10/27  - Cont meds as above    7. Type 2 DM  - Adjusted insulin due to hypoglycemia in am  - Lantus was decreased from 20u to 13unit     8.  BPH  - c/w Tamsulosin 0.4 mg daily    Pending: Podiatry procedure  -DVT prophylaxis: Heparin  -GI prophylaxis: Not indicated  -Diet: Renal, 1.2l fluid restriction  -Code status: Full code      patient need foot debridement that was postponed until next Friday     Case Discussed with House Staff   26  minutes spent on total encounter; more than 50% of the visit was spent counseling and/or coordinating care by the attending physician.   Spectra [phone]

## 2022-11-22 NOTE — PROGRESS NOTE ADULT - SUBJECTIVE AND OBJECTIVE BOX
Nephrology progress note    Patient was seen and examined, events over the last 24 h noted .    Allergies:  No Known Allergies    Hospital Medications:   MEDICATIONS  (STANDING):  aspirin enteric coated 81 milliGRAM(s) Oral daily  atorvastatin 40 milliGRAM(s) Oral at bedtime  clopidogrel Tablet 75 milliGRAM(s) Oral daily  cyanocobalamin 1000 MICROGram(s) Oral daily  darbepoetin Injectable Syringe 60 MICROGram(s) IV Push <User Schedule>  famotidine    Tablet 20 milliGRAM(s) Oral daily  folic acid 1 milliGRAM(s) Oral daily  furosemide    Tablet 40 milliGRAM(s) Oral daily  heparin   Injectable 5000 Unit(s) SubCutaneous every 8 hours  influenza   Vaccine 0.5 milliLiter(s) IntraMuscular once  insulin glargine Injectable (LANTUS) 20 Unit(s) SubCutaneous at bedtime  insulin lispro (ADMELOG) corrective regimen sliding scale   SubCutaneous three times a day before meals  insulin lispro Injectable (ADMELOG) 5 Unit(s) SubCutaneous before breakfast  insulin lispro Injectable (ADMELOG) 5 Unit(s) SubCutaneous before lunch  metoprolol tartrate 12.5 milliGRAM(s) Oral every 12 hours  NIFEdipine XL 30 milliGRAM(s) Oral daily  regadenoson Injectable 0.4 milliGRAM(s) IV Push once  senna 2 Tablet(s) Oral at bedtime  sevelamer carbonate 800 milliGRAM(s) Oral three times a day with meals  tamsulosin 0.4 milliGRAM(s) Oral at bedtime        VITALS:  T(F): 96.9 (11-22-22 @ 07:14), Max: 97 (11-21-22 @ 23:33)  HR: 81 (11-22-22 @ 07:14)  BP: 147/69 (11-22-22 @ 07:14)  RR: 18 (11-22-22 @ 07:14)  SpO2: 97% (11-21-22 @ 13:53)  Wt(kg): --    11-21 @ 07:01  -  11-22 @ 07:00  --------------------------------------------------------  IN: 0 mL / OUT: 3000 mL / NET: -3000 mL      Height (cm): 180.3 (11-21 @ 13:53)  Weight (kg): 90.8 (11-21 @ 13:53)  BMI (kg/m2): 27.9 (11-21 @ 13:53)  BSA (m2): 2.11 (11-21 @ 13:53)    PHYSICAL EXAM:  Constitutional: NAD  HEENT: anicteric sclera, oropharynx clear, MMM  Neck: No JVD  Respiratory: CTAB, no wheezes, rales or rhonchi  Cardiovascular: S1, S2, RRR  Gastrointestinal: BS+, soft, NT/ND  Extremities: No cyanosis or clubbing. No peripheral edema  :  No schneider.   Skin: No rashes    LABS:  11-21    133<L>  |  95<L>  |  54<H>  ----------------------------<  86  4.7   |  26  |  6.8<HH>    Ca    8.4      21 Nov 2022 07:41  Mg     2.1     11-21    TPro  7.3  /  Alb  3.1<L>  /  TBili  0.3  /  DBili      /  AST  7   /  ALT  <5  /  AlkPhos  195<H>  11-21                          8.3    8.24  )-----------( 286      ( 22 Nov 2022 07:46 )             25.0       Urine Studies:      RADIOLOGY & ADDITIONAL STUDIES:

## 2022-11-22 NOTE — PROGRESS NOTE ADULT - SUBJECTIVE AND OBJECTIVE BOX
LAWRENCE SCHWABACHER 62y Male  MRN#: [id number]   CODE STATUS:________    Hospital Day: 8d    Pt is currently admitted with the primary diagnosis of chest pain     SUBJECTIVE  HPI:   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 and cefepime post dialysis s.p wound Vac, chronic anemia, HFmrEF, presented to the hospital for chest pain   Pain is mid sternal, pressure like, discomfort rather than pain, occurring at rest, non radiating, exacerbated by cough, not similar to previous episodes of chest pain, not associated with tremor , diaphoresis, numbness, lightheadedness; Pain has improved with tylenol initially and then resolved with nitroglycerin SL   Patient denies fever, chills, shortness of breath, sputum, abdominal pain; ROS otherwise negative    Troponin 0.70 in setting of ESRD, 0.25 back in 09/2022  - Troponin stable between 0.7-0.8  - TTE LVEF 45-50%, Grade 3 diastolic dysfunction  NST:  NO EVIDENCE FOR ISCHEMIA DURING LEXISCAN INFUSION. FIXED PERFUSION   DEFECT INVOLVING ANTERIOR WALL OF THE LEFT VENTRICLE EXTENDING TO APEX   CONSISTENT WITH SCAR. GLOBAL HYPOKINESIS WITH POOR THICKENING OF THE ANTERIOR WALL. LEFT VENTRICULAR EJECTION FRACTION OF  36 % WHICH IS LOW.  - c/w ASA 81 mg daily- c/w Plavix 75 mg daily- c/w atorvastatin 40 mg daily- c/w Nifedipine 30 mg daily  EKG: Left anterior fascicular block ST & T wave abnormality, consider lateral ischemia. Prolonged QT    pt scheduled for DFU friday (yesterday was cancelled)     Overnight events: none    Subjective complaints: none    Present Today:   - [place]acey:  No [  ], Yes [   ] : Indication:     - Type of IV Access:       .. CVC/Piccline:  No [  ], Yes [   ] : Indication:       .. Midline: No [  ], Yes [   ] : Indication:                                             ----------------------------------------------------------  OBJECTIVE  PAST MEDICAL & SURGICAL HISTORY  CAD (coronary artery disease)  1 stent 2008    S/P CABG (coronary artery bypass graft)  x4    Diabetes mellitus    Transient ischemic attack (TIA)  2017; 2008    Chronic kidney disease (CKD)  Stage IV    Hypertension    Stented coronary artery  in 2008    Myocardial infarction  2012    PAD (peripheral artery disease)  S/p bypass left leg    HLD (hyperlipidemia)    BPH (benign prostatic hyperplasia)    Pain in left knee  s/p fall    OA (osteoarthritis)    [place] (hard of hearing)    Chronic anemia    S/P CABG (coronary artery bypass graft)  2012    H/O arterial bypass of lower limb  Left Lower Extremity (2016)    History of surgery  Left CEA (2017)  Left Pinkie toe Amputation (2014)  CABG x 4 (2012)  Card cath - stent (2008)      AV fistula  2019  LEFT AV FISTULA                                              -----------------------------------------------------------  ALLERGIES:  No Known Allergies                                            ------------------------------------------------------------    HOME MEDICATIONS  Home Medications:  aspirin 81 mg oral tablet: 1 tab(s) orally once a day (14 Sep 2022 17:42)  cefepime 2 g intravenous injection: 2 granules intravenous Monday, Wednesday, and Friday post dialysis (14 Nov 2022 08:01)  DAPTOmycin 500 mg intravenous injection: 900 milligram(s) intravenous Monday, Wednesday, and Friday post dialysis (14 Nov 2022 08:02)  furosemide 40 mg oral tablet: 1 tab(s) orally once a day (14 Nov 2022 07:59)  Levemir 100 units/mL subcutaneous solution: 40 unit(s) subcutaneous once a day (at bedtime) (14 Sep 2022 17:42)  NIFEdipine 30 mg oral tablet, extended release: 1 tab(s) orally once a day (07 Nov 2022 13:45)  nitroglycerin 0.4 mg sublingual spray: 1 tab(s) sublingual every 5 minutes, As Needed up to 3 tabs (14 Nov 2022 08:05)  Plavix 75 mg oral tablet: 1 tab(s) orally once a day (14 Sep 2022 17:42)  Senna 8.6 mg oral tablet: 2 tab(s) orally once a day (at bedtime) (14 Nov 2022 08:03)  sevelamer carbonate 800 mg oral tablet: 1 tab(s) orally 3 times a day (with meals) (20 Sep 2022 11:36)  tamsulosin 0.4 mg oral capsule: 1 cap(s) orally once a day (at bedtime) (07 Nov 2022 13:45)  Trulicity Pen 1.5 mg/0.5 mL subcutaneous solution: 1.5  subcutaneous once a week (14 Sep 2022 17:42)  Tylenol 325 mg oral tablet: 2 tab(s) orally every 6 hours, As Needed (14 Nov 2022 08:03)                           MEDICATIONS:  STANDING MEDICATIONS  aspirin enteric coated 81 milliGRAM(s) Oral daily  atorvastatin 40 milliGRAM(s) Oral at bedtime  clopidogrel Tablet 75 milliGRAM(s) Oral daily  cyanocobalamin 1000 MICROGram(s) Oral daily  darbepoetin Injectable Syringe 60 MICROGram(s) IV Push <User Schedule>  famotidine    Tablet 20 milliGRAM(s) Oral daily  folic acid 1 milliGRAM(s) Oral daily  furosemide    Tablet 40 milliGRAM(s) Oral daily  heparin   Injectable 5000 Unit(s) SubCutaneous every 8 hours  influenza   Vaccine 0.5 milliLiter(s) IntraMuscular once  insulin glargine Injectable (LANTUS) 20 Unit(s) SubCutaneous at bedtime  insulin lispro (ADMELOG) corrective regimen sliding scale   SubCutaneous three times a day before meals  insulin lispro Injectable (ADMELOG) 5 Unit(s) SubCutaneous before breakfast  insulin lispro Injectable (ADMELOG) 5 Unit(s) SubCutaneous before lunch  metoprolol tartrate 12.5 milliGRAM(s) Oral every 12 hours  NIFEdipine XL 30 milliGRAM(s) Oral daily  regadenoson Injectable 0.4 milliGRAM(s) IV Push once  senna 2 Tablet(s) Oral at bedtime  sevelamer carbonate 800 milliGRAM(s) Oral three times a day with meals  tamsulosin 0.4 milliGRAM(s) Oral at bedtime    PRN MEDICATIONS  acetaminophen     Tablet .. 650 milliGRAM(s) Oral every 6 hours PRN  melatonin 3 milliGRAM(s) Oral at bedtime PRN                                            ------------------------------------------------------------    11-21-22 @ 07:01  -  11-22-22 @ 07:00  --------------------------------------------------------  IN: 0 mL / OUT: 3000 mL / NET: -3000 mL                                                 --------------------------------------------------------------  LABS:                        8.3    8.24  )-----------( 286      ( 22 Nov 2022 07:46 )             25.0     11-21    133<L>  |  95<L>  |  54<H>  ----------------------------<  86  4.7   |  26  |  6.8<HH>    Ca    8.4      21 Nov 2022 07:41  Mg     2.1     11-21    TPro  7.3  /  Alb  3.1<L>  /  TBili  0.3  /  DBili  x   /  AST  7   /  ALT  <5  /  AlkPhos  195<H>  11-21    PT/INR - ( 21 Nov 2022 12:29 )   PT: 12.90 sec;   INR: 1.13 ratio         PTT - ( 21 Nov 2022 12:29 )  PTT:32.0 sec                                                            -------------------------------------------------------------  RADIOLOGY:                                            --------------------------------------------------------------  VITAL SIGNS: Last 24 Hours  T(C): 36.1 (22 Nov 2022 07:14), Max: 36.1 (21 Nov 2022 13:53)  T(F): 96.9 (22 Nov 2022 07:14), Max: 97 (21 Nov 2022 23:33)  HR: 81 (22 Nov 2022 07:14) (81 - 90)  BP: 147/69 (22 Nov 2022 07:14) (134/72 - 152/82)  BP(mean): 104 (21 Nov 2022 13:53) (104 - 104)  RR: 18 (22 Nov 2022 07:14) (18 - 18)  SpO2: 97% (21 Nov 2022 13:53) (97% - 97%)    PHYSICAL EXAM:  General: Awake, oriented and resting comfortably, no acute distress  ENT:  moist mucous membranes  Cardiac: regular rate and rhythm, normal S1 and S2, no murmurs, rubs or gallops  Respiratory: clear to auscultation bilaterally, no wheezes, crackles or rhonchi, unlabored respirations  Abdomen: normoactive bowel sounds x4, non tender, nondistended  Ext: No edema  Neuro: A&O x3, no focal neurological deficits                                                --------------------------------------------------------------    ASSESSMENT & PLAN    Past medical history and hospital course     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 and cefepime post dialysis s.p wound Vac, chronic anemia, HFmrEF, presented to the hospital for chest pain     # ACS/CAD presenting with Chest pain  #CAD s/p PCI/CABG  #PAD s/p angioplasty  #HTN/HLD  #Chronic HFmrEF  Troponin 0.70 in setting of ESRD, 0.25 back in 09/2022  - Troponin stable between 0.7-0.8  - TTE LVEF 45-50%, Grade 3 diastolic dysfunction  NST: FIXED PERFUSION DEFECT INVOLVING ANTERIOR WALL OF THE LEFT VENTRICLE EXTENDING TO APEX CONSISTENT WITH SCAR. GLOBAL HYPOKINESIS WITH POOR THICKENING OF THE ANTERIOR WALL. EF  36 %   - c/w ASA 81 mg daily  - c/w Plavix 75 mg daily  - c/w atorvastatin 40 mg daily  - c/w Nifedipine 30 mg daily  EKG: Left anterior fascicular block ST & T wave abnormality, consider lateral ischemia. Prolonged QT      #NSVT  -11/16 PM 10 beats of NSVT observed on tele   -Started Metoprolol 12.5 BID  -Maintain K>4 and Mg>2    # Anemia of chronic disease  - hb on admission 6.6 baseline 7.6. s/p 1U PRBC  -11/21 pt got 1 unit pRBCs   - Monitor CBC, transfuse to keep >8  -maintain active T&S     # ESRD on HD   - HD per nephrology  - B/L pitting edema, little urine output    #Recent hx Left calcaneal OM   - Wound Cx 9/16 - Enterobacter cloacae  - wound CX 10/16 VRE Faecium Enterobacter cloacae complex, Proteus vulgaris, Morganella morganii   - s/p debridement 10/21 Wound Cx Proteus mirabilis, VRE faecium, Pseudomonas putida  - s/p debridement 10/24 Crumble L calcaneous   - s/p debridement 11/3 with wound vac in place  - Cefepime 2g post HD through 11/30; s/p 3 session of daptomycin post-HD  - On last admission, ID plan for 4 weeks from last debridement (11/3-11/30)  -Debridement 11/21 PM cancelled  -Rescheduled debridement for 11/25  -pt requested PT until surgery     # PAD  - s/p angiogram LLE with peroneal artery angioplasty and arterectomy 10/27  - Cont meds as above    # Type 2 DM  Continue insulin regimen    # BPH  - c/w Tamsulosin 0.4 mg daily    Pending: Podiatry procedure  -DVT prophylaxis: Heparin  -GI prophylaxis: Not indicated  -Diet: Renal, 1.2l fluid restriction  -Code status: Full code

## 2022-11-23 LAB
ALBUMIN SERPL ELPH-MCNC: 3.3 G/DL — LOW (ref 3.5–5.2)
ALP SERPL-CCNC: 191 U/L — HIGH (ref 30–115)
ALT FLD-CCNC: <5 U/L — SIGNIFICANT CHANGE UP (ref 0–41)
ANION GAP SERPL CALC-SCNC: 13 MMOL/L — SIGNIFICANT CHANGE UP (ref 7–14)
AST SERPL-CCNC: 6 U/L — SIGNIFICANT CHANGE UP (ref 0–41)
BASOPHILS # BLD AUTO: 0.07 K/UL — SIGNIFICANT CHANGE UP (ref 0–0.2)
BASOPHILS NFR BLD AUTO: 0.8 % — SIGNIFICANT CHANGE UP (ref 0–1)
BILIRUB SERPL-MCNC: 0.3 MG/DL — SIGNIFICANT CHANGE UP (ref 0.2–1.2)
BLD GP AB SCN SERPL QL: SIGNIFICANT CHANGE UP
BUN SERPL-MCNC: 48 MG/DL — HIGH (ref 10–20)
CALCIUM SERPL-MCNC: 8.3 MG/DL — LOW (ref 8.4–10.5)
CHLORIDE SERPL-SCNC: 93 MMOL/L — LOW (ref 98–110)
CO2 SERPL-SCNC: 24 MMOL/L — SIGNIFICANT CHANGE UP (ref 17–32)
CREAT SERPL-MCNC: 5.8 MG/DL — CRITICAL HIGH (ref 0.7–1.5)
EGFR: 10 ML/MIN/1.73M2 — LOW
EOSINOPHIL # BLD AUTO: 0.18 K/UL — SIGNIFICANT CHANGE UP (ref 0–0.7)
EOSINOPHIL NFR BLD AUTO: 2.1 % — SIGNIFICANT CHANGE UP (ref 0–8)
GLUCOSE BLDC GLUCOMTR-MCNC: 105 MG/DL — HIGH (ref 70–99)
GLUCOSE BLDC GLUCOMTR-MCNC: 129 MG/DL — HIGH (ref 70–99)
GLUCOSE BLDC GLUCOMTR-MCNC: 138 MG/DL — HIGH (ref 70–99)
GLUCOSE BLDC GLUCOMTR-MCNC: 190 MG/DL — HIGH (ref 70–99)
GLUCOSE BLDC GLUCOMTR-MCNC: 82 MG/DL — SIGNIFICANT CHANGE UP (ref 70–99)
GLUCOSE SERPL-MCNC: 68 MG/DL — LOW (ref 70–99)
HCT VFR BLD CALC: 26.2 % — LOW (ref 42–52)
HGB BLD-MCNC: 8.4 G/DL — LOW (ref 14–18)
IMM GRANULOCYTES NFR BLD AUTO: 0.5 % — HIGH (ref 0.1–0.3)
LYMPHOCYTES # BLD AUTO: 1.19 K/UL — LOW (ref 1.2–3.4)
LYMPHOCYTES # BLD AUTO: 13.6 % — LOW (ref 20.5–51.1)
MAGNESIUM SERPL-MCNC: 2 MG/DL — SIGNIFICANT CHANGE UP (ref 1.8–2.4)
MCHC RBC-ENTMCNC: 29.7 PG — SIGNIFICANT CHANGE UP (ref 27–31)
MCHC RBC-ENTMCNC: 32.1 G/DL — SIGNIFICANT CHANGE UP (ref 32–37)
MCV RBC AUTO: 92.6 FL — SIGNIFICANT CHANGE UP (ref 80–94)
MONOCYTES # BLD AUTO: 0.77 K/UL — HIGH (ref 0.1–0.6)
MONOCYTES NFR BLD AUTO: 8.8 % — SIGNIFICANT CHANGE UP (ref 1.7–9.3)
NEUTROPHILS # BLD AUTO: 6.53 K/UL — HIGH (ref 1.4–6.5)
NEUTROPHILS NFR BLD AUTO: 74.2 % — SIGNIFICANT CHANGE UP (ref 42.2–75.2)
NRBC # BLD: 0 /100 WBCS — SIGNIFICANT CHANGE UP (ref 0–0)
PLATELET # BLD AUTO: 258 K/UL — SIGNIFICANT CHANGE UP (ref 130–400)
POTASSIUM SERPL-MCNC: 4.2 MMOL/L — SIGNIFICANT CHANGE UP (ref 3.5–5)
POTASSIUM SERPL-SCNC: 4.2 MMOL/L — SIGNIFICANT CHANGE UP (ref 3.5–5)
PROT SERPL-MCNC: 7.5 G/DL — SIGNIFICANT CHANGE UP (ref 6–8)
RBC # BLD: 2.83 M/UL — LOW (ref 4.7–6.1)
RBC # FLD: 15.4 % — HIGH (ref 11.5–14.5)
SODIUM SERPL-SCNC: 130 MMOL/L — LOW (ref 135–146)
WBC # BLD: 8.78 K/UL — SIGNIFICANT CHANGE UP (ref 4.8–10.8)
WBC # FLD AUTO: 8.78 K/UL — SIGNIFICANT CHANGE UP (ref 4.8–10.8)

## 2022-11-23 PROCEDURE — 99233 SBSQ HOSP IP/OBS HIGH 50: CPT

## 2022-11-23 RX ADMIN — TAMSULOSIN HYDROCHLORIDE 0.4 MILLIGRAM(S): 0.4 CAPSULE ORAL at 21:12

## 2022-11-23 RX ADMIN — Medication 5 UNIT(S): at 08:07

## 2022-11-23 RX ADMIN — Medication 81 MILLIGRAM(S): at 11:40

## 2022-11-23 RX ADMIN — Medication 40 MILLIGRAM(S): at 05:57

## 2022-11-23 RX ADMIN — PREGABALIN 1000 MICROGRAM(S): 225 CAPSULE ORAL at 11:41

## 2022-11-23 RX ADMIN — HEPARIN SODIUM 5000 UNIT(S): 5000 INJECTION INTRAVENOUS; SUBCUTANEOUS at 21:12

## 2022-11-23 RX ADMIN — HEPARIN SODIUM 5000 UNIT(S): 5000 INJECTION INTRAVENOUS; SUBCUTANEOUS at 05:57

## 2022-11-23 RX ADMIN — FAMOTIDINE 20 MILLIGRAM(S): 10 INJECTION INTRAVENOUS at 11:40

## 2022-11-23 RX ADMIN — ATORVASTATIN CALCIUM 40 MILLIGRAM(S): 80 TABLET, FILM COATED ORAL at 21:12

## 2022-11-23 RX ADMIN — Medication 12.5 MILLIGRAM(S): at 17:10

## 2022-11-23 RX ADMIN — SEVELAMER CARBONATE 800 MILLIGRAM(S): 2400 POWDER, FOR SUSPENSION ORAL at 08:07

## 2022-11-23 RX ADMIN — INSULIN GLARGINE 13 UNIT(S): 100 INJECTION, SOLUTION SUBCUTANEOUS at 21:12

## 2022-11-23 RX ADMIN — Medication 1 MILLIGRAM(S): at 11:40

## 2022-11-23 RX ADMIN — SEVELAMER CARBONATE 800 MILLIGRAM(S): 2400 POWDER, FOR SUSPENSION ORAL at 11:40

## 2022-11-23 RX ADMIN — SEVELAMER CARBONATE 800 MILLIGRAM(S): 2400 POWDER, FOR SUSPENSION ORAL at 17:10

## 2022-11-23 RX ADMIN — HEPARIN SODIUM 5000 UNIT(S): 5000 INJECTION INTRAVENOUS; SUBCUTANEOUS at 14:06

## 2022-11-23 RX ADMIN — Medication 30 MILLIGRAM(S): at 05:57

## 2022-11-23 RX ADMIN — CEFEPIME 100 MILLIGRAM(S): 1 INJECTION, POWDER, FOR SOLUTION INTRAMUSCULAR; INTRAVENOUS at 11:50

## 2022-11-23 RX ADMIN — Medication 12.5 MILLIGRAM(S): at 05:57

## 2022-11-23 RX ADMIN — CLOPIDOGREL BISULFATE 75 MILLIGRAM(S): 75 TABLET, FILM COATED ORAL at 11:40

## 2022-11-23 NOTE — CHART NOTE - NSCHARTNOTEFT_GEN_A_CORE
Patient is stable for discharge from podiatry standpoint with plan for surgical debridement as outpatient. Patient is aware and agreeable to plan at this time. Surgery for Friday 11/25 cancelled. Please follow up with Dr. Garvin @ [facility] [address], [place] for surgical scheduling.     Podiatry   [phone]

## 2022-11-23 NOTE — PROGRESS NOTE ADULT - SUBJECTIVE AND OBJECTIVE BOX
Nephrology progress note  Patient is seen and examined, events over the last 24 h noted .  Lying in bed comfortable     Allergies:  No Known Allergies    Hospital Medications:   MEDICATIONS  (STANDING):    aspirin enteric coated 81 milliGRAM(s) Oral daily  atorvastatin 40 milliGRAM(s) Oral at bedtime  cefepime   IVPB 2000 milliGRAM(s) IV Intermittent <User Schedule>  clopidogrel Tablet 75 milliGRAM(s) Oral daily  cyanocobalamin 1000 MICROGram(s) Oral daily  darbepoetin Injectable Syringe 60 MICROGram(s) IV Push <User Schedule>  famotidine    Tablet 20 milliGRAM(s) Oral daily  folic acid 1 milliGRAM(s) Oral daily  furosemide    Tablet 40 milliGRAM(s) Oral daily  glucagon  Injectable 1 milliGRAM(s) IntraMuscular once  heparin   Injectable 5000 Unit(s) SubCutaneous every 8 hours  influenza   Vaccine 0.5 milliLiter(s) IntraMuscular once  insulin glargine Injectable (LANTUS) 13 Unit(s) SubCutaneous at bedtime  insulin lispro (ADMELOG) corrective regimen sliding scale   SubCutaneous three times a day before meals  insulin lispro Injectable (ADMELOG) 5 Unit(s) SubCutaneous before breakfast  insulin lispro Injectable (ADMELOG) 5 Unit(s) SubCutaneous before lunch  metoprolol tartrate 12.5 milliGRAM(s) Oral every 12 hours  NIFEdipine XL 30 milliGRAM(s) Oral daily  regadenoson Injectable 0.4 milliGRAM(s) IV Push once  senna 2 Tablet(s) Oral at bedtime  sevelamer carbonate 800 milliGRAM(s) Oral three times a day with meals  tamsulosin 0.4 milliGRAM(s) Oral at bedtime        VITALS:  T(F): 95.6 (11-23-22 @ 08:08), Max: 97.4 (11-22-22 @ 23:24)  HR: 83 (11-23-22 @ 08:08)  BP: 154/73 (11-23-22 @ 08:08)  RR: 19 (11-23-22 @ 08:08)      11-21 @ 07:01  -  11-22 @ 07:00  --------------------------------------------------------  IN: 0 mL / OUT: 3000 mL / NET: -3000 mL    11-22 @ 07:01  -  11-23 @ 07:00  --------------------------------------------------------  IN: 720 mL / OUT: 1500 mL / NET: -780 mL          PHYSICAL EXAM:  Constitutional: NAD  Neck: No JVD  Respiratory: CTAB  Cardiovascular: S1, S2, RRR  Gastrointestinal: BS+, soft, NT/ND  Extremities: No cyanosis or clubbing. No peripheral edema/ left foot in dressing   :  No schneider.   Skin: No rashes    LABS:  11-22    144  |  102  |  39<H>  ----------------------------<  54<LL>  4.5   |  24  |  5.2<HH>    Ca    8.5      22 Nov 2022 07:46  Mg     2.3     11-22    TPro  7.6  /  Alb  3.3<L>  /  TBili  0.3  /  DBili      /  AST  7   /  ALT  <5  /  AlkPhos  198<H>  11-22                          8.3    8.24  )-----------( 286      ( 22 Nov 2022 07:46 )             25.0       Urine Studies:        Iron 39, TIBC 133, %sat 29      [10-21-22 @ 10:30]  Ferritin 1126      [10-21-22 @ 10:30]  PTH -- (Ca 7.5)      [02-26-22 @ 16:00]   312  Vitamin D (25OH) 21      [02-26-22 @ 16:00]  HbA1c 5.8      [01-30-20 @ 06:46]  Lipid: chol 81, , HDL 22, LDL --      [10-15-22 @ 09:53]    HBsAb <3.0      [12-29-19 @ 17:44]  HBsAb Nonreact      [11-03-22 @ 06:40]  HBsAg Nonreact      [11-03-22 @ 06:40]  HBcAb Nonreact      [11-03-22 @ 06:40]  HCV 0.14, Nonreact      [10-27-22 @ 04:09]        RADIOLOGY & ADDITIONAL STUDIES:

## 2022-11-23 NOTE — PROGRESS NOTE ADULT - ASSESSMENT
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 and cefepime post dialysis s.p wound Vac, chronic anemia, HFmrEF, presented to the hospital for chest pain     1. Chest pain. hx of CAD s/p PCI/CABG and PAD s/p angioplasty  ACS/CAD presenting with Chest pain  -  Cont aspirin and plavix   - Cont statin 40mg HS   - Cont Nifedipine 30mg po daily   - Troponin stable between 0.7-0.8  NST: FIXED PERFUSION DEFECT INVOLVING ANTERIOR WALL OF THE LEFT VENTRICLE EXTENDING TO APEX CONSISTENT WITH SCAR. GLOBAL HYPOKINESIS WITH POOR THICKENING OF THE ANTERIOR WALL. EF  36 %       2. NSVT  -10 beats of NSVT observed on tele on 11/16 PM  -Started Metoprolol 12.5 BID  -Maintain K>4 and Mg>2    3. Anemia of chronic disease s/p 1 PRBC   - Monitor CBC, transfuse to keep >8    4.  ESRD on HD   - HD per nephrology  - B/L pitting edema, little urine output    5. Recent hx Left calcaneal OM   - Wound Cx 9/16 - Enterobacter cloacae  - wound CX 10/16 VRE Faecium Enterobacter cloacae complex, Proteus vulgaris, Morganella morganii   - s/p debridement 10/21 Wound Cx Proteus mirabilis, VRE faecium, Pseudomonas putida  - s/p debridement 10/24 Crumble L calcaneous   - s/p debridement 11/3 with wound vac in place  - Cefepime 2g post HD through 11/30; s/p 3 session of daptomycin post-HD  - On last admission, ID plan for 4 weeks from last debridement (11/3-11/30)  -Debridement cancelled for Friday 11/25/2022 -- patient has fowl smelling wound and DC not possible today--recall podiatry-- I have sent text on Teams    6.  PAD  - s/p angiogram LLE with peroneal artery angioplasty and arterectomy 10/27  - Cont meds as above    7. Type 2 DM  - Adjusted insulin due to hypoglycemia in am  - Lantus was decreased from 20u to 13unit     8.  BPH  - c/w Tamsulosin 0.4 mg daily    Pending: Podiatry procedure  -DVT prophylaxis: Heparin  -GI prophylaxis: Not indicated  -Diet: Renal, 1.2l fluid restriction  -Code status: Full code   DC plan cancelled today-- recall podiatry AM

## 2022-11-23 NOTE — PROGRESS NOTE ADULT - SUBJECTIVE AND OBJECTIVE BOX
LAWRENCE SCHWABACHER 62y Male  MRN#: [id number]   CODE STATUS:________    Hospital Day: 9d    Pt is currently admitted with the primary diagnosis of     SUBJECTIVE  Hospital course   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 and cefepime post dialysis s.p wound Vac, chronic anemia, HFmrEF, presented to the hospital for chest pain   Pain is mid sternal, pressure like, discomfort rather than pain, occurring at rest, non radiating, exacerbated by cough, not similar to previous episodes of chest pain, not associated with tremor , diaphoresis, numbness, lightheadedness; Pain has improved with tylenol initially and then resolved with nitroglycerin SL   Patient denies fever, chills, shortness of breath, sputum, abdominal pain; ROS otherwise negative    Troponin 0.70 in setting of ESRD, 0.25 back in 09/2022  - Troponin stable between 0.7-0.8  - TTE LVEF 45-50%, Grade 3 diastolic dysfunction  NST:  NO EVIDENCE FOR ISCHEMIA DURING LEXISCAN INFUSION. FIXED PERFUSION   DEFECT INVOLVING ANTERIOR WALL OF THE LEFT VENTRICLE EXTENDING TO APEX   CONSISTENT WITH SCAR. GLOBAL HYPOKINESIS WITH POOR THICKENING OF THE ANTERIOR WALL. LEFT VENTRICULAR EJECTION FRACTION OF  36 % WHICH IS LOW.  - c/w ASA 81 mg daily- c/w Plavix 75 mg daily- c/w atorvastatin 40 mg daily- c/w Nifedipine 30 mg daily  EKG: Left anterior fascicular block ST & T wave abnormality, consider lateral ischemia. Prolonged QT    Overnight events: none    Subjective complaints: none    Present Today:   - [place]acey:  No [  ], Yes [   ] : Indication:     - Type of IV Access:       .. CVC/Piccline:  No [  ], Yes [   ] : Indication:       .. Midline: No [  ], Yes [   ] : Indication:                                             ----------------------------------------------------------  OBJECTIVE  PAST MEDICAL & SURGICAL HISTORY  CAD (coronary artery disease)  1 stent 2008    S/P CABG (coronary artery bypass graft)  x4    Diabetes mellitus    Transient ischemic attack (TIA)  2017; 2008    Chronic kidney disease (CKD)  Stage IV    Hypertension    Stented coronary artery  in 2008    Myocardial infarction  2012    PAD (peripheral artery disease)  S/p bypass left leg    HLD (hyperlipidemia)    BPH (benign prostatic hyperplasia)    Pain in left knee  s/p fall    OA (osteoarthritis)    [place] (hard of hearing)    Chronic anemia    S/P CABG (coronary artery bypass graft)  2012    H/O arterial bypass of lower limb  Left Lower Extremity (2016)    History of surgery  Left CEA (2017)  Left Pinkie toe Amputation (2014)  CABG x 4 (2012)  Card cath - stent (2008)      AV fistula  2019  LEFT AV FISTULA                                              -----------------------------------------------------------  ALLERGIES:  No Known Allergies                                            ------------------------------------------------------------    HOME MEDICATIONS  Home Medications:  aspirin 81 mg oral tablet: 1 tab(s) orally once a day (14 Sep 2022 17:42)  cefepime 2 g intravenous injection: 2 granules intravenous Monday, Wednesday, and Friday post dialysis (14 Nov 2022 08:01)  DAPTOmycin 500 mg intravenous injection: 900 milligram(s) intravenous Monday, Wednesday, and Friday post dialysis (14 Nov 2022 08:02)  furosemide 40 mg oral tablet: 1 tab(s) orally once a day (14 Nov 2022 07:59)  Levemir 100 units/mL subcutaneous solution: 40 unit(s) subcutaneous once a day (at bedtime) (14 Sep 2022 17:42)  NIFEdipine 30 mg oral tablet, extended release: 1 tab(s) orally once a day (07 Nov 2022 13:45)  nitroglycerin 0.4 mg sublingual spray: 1 tab(s) sublingual every 5 minutes, As Needed up to 3 tabs (14 Nov 2022 08:05)  Plavix 75 mg oral tablet: 1 tab(s) orally once a day (14 Sep 2022 17:42)  Senna 8.6 mg oral tablet: 2 tab(s) orally once a day (at bedtime) (14 Nov 2022 08:03)  sevelamer carbonate 800 mg oral tablet: 1 tab(s) orally 3 times a day (with meals) (20 Sep 2022 11:36)  tamsulosin 0.4 mg oral capsule: 1 cap(s) orally once a day (at bedtime) (07 Nov 2022 13:45)  Trulicity Pen 1.5 mg/0.5 mL subcutaneous solution: 1.5  subcutaneous once a week (14 Sep 2022 17:42)  Tylenol 325 mg oral tablet: 2 tab(s) orally every 6 hours, As Needed (14 Nov 2022 08:03)                           MEDICATIONS:  STANDING MEDICATIONS  aspirin enteric coated 81 milliGRAM(s) Oral daily  atorvastatin 40 milliGRAM(s) Oral at bedtime  cefepime   IVPB 2000 milliGRAM(s) IV Intermittent <User Schedule>  clopidogrel Tablet 75 milliGRAM(s) Oral daily  cyanocobalamin 1000 MICROGram(s) Oral daily  darbepoetin Injectable Syringe 60 MICROGram(s) IV Push <User Schedule>  dextrose 5%. 1000 milliLiter(s) IV Continuous <Continuous>  dextrose 5%. 1000 milliLiter(s) IV Continuous <Continuous>  dextrose 50% Injectable 25 Gram(s) IV Push once  dextrose 50% Injectable 12.5 Gram(s) IV Push once  dextrose 50% Injectable 25 Gram(s) IV Push once  famotidine    Tablet 20 milliGRAM(s) Oral daily  folic acid 1 milliGRAM(s) Oral daily  furosemide    Tablet 40 milliGRAM(s) Oral daily  glucagon  Injectable 1 milliGRAM(s) IntraMuscular once  heparin   Injectable 5000 Unit(s) SubCutaneous every 8 hours  influenza   Vaccine 0.5 milliLiter(s) IntraMuscular once  insulin glargine Injectable (LANTUS) 13 Unit(s) SubCutaneous at bedtime  insulin lispro (ADMELOG) corrective regimen sliding scale   SubCutaneous three times a day before meals  insulin lispro Injectable (ADMELOG) 5 Unit(s) SubCutaneous before breakfast  insulin lispro Injectable (ADMELOG) 5 Unit(s) SubCutaneous before lunch  metoprolol tartrate 12.5 milliGRAM(s) Oral every 12 hours  NIFEdipine XL 30 milliGRAM(s) Oral daily  regadenoson Injectable 0.4 milliGRAM(s) IV Push once  senna 2 Tablet(s) Oral at bedtime  sevelamer carbonate 800 milliGRAM(s) Oral three times a day with meals  tamsulosin 0.4 milliGRAM(s) Oral at bedtime    PRN MEDICATIONS  acetaminophen     Tablet .. 650 milliGRAM(s) Oral every 6 hours PRN  dextrose Oral Gel 15 Gram(s) Oral once PRN  melatonin 3 milliGRAM(s) Oral at bedtime PRN                                            ------------------------------------------------------------    11-22-22 @ 07:01  -  11-23-22 @ 07:00  --------------------------------------------------------  IN: 720 mL / OUT: 1500 mL / NET: -780 mL                                                 --------------------------------------------------------------  LABS:                        8.4    8.78  )-----------( 258      ( 23 Nov 2022 06:13 )             26.2     11-23    130<L>  |  93<L>  |  48<H>  ----------------------------<  68<L>  4.2   |  24  |  5.8<HH>    Ca    8.3<L>      23 Nov 2022 06:13  Mg     2.0     11-23    TPro  7.5  /  Alb  3.3<L>  /  TBili  0.3  /  DBili  x   /  AST  6   /  ALT  <5  /  AlkPhos  191<H>  11-23    PT/INR - ( 21 Nov 2022 12:29 )   PT: 12.90 sec;   INR: 1.13 ratio         PTT - ( 21 Nov 2022 12:29 )  PTT:32.0 sec                                                            -------------------------------------------------------------  RADIOLOGY:                                            --------------------------------------------------------------  VITAL SIGNS: Last 24 Hours  T(C): 35.3 (23 Nov 2022 08:08), Max: 36.3 (22 Nov 2022 23:24)  T(F): 95.6 (23 Nov 2022 08:08), Max: 97.4 (22 Nov 2022 23:24)  HR: 84 (23 Nov 2022 08:30) (79 - 91)  BP: 142/81 (23 Nov 2022 08:30) (142/81 - 155/70)  BP(mean): --  RR: 18 (23 Nov 2022 08:30) (18 - 19)  SpO2: --    PHYSICAL EXAM:  General: Awake, oriented and resting comfortably, no acute distress  Head: normocephalic, atraumatic  ENT:  moist mucous membranes  Cardiac: regular rate and rhythm, normal S1 and S2, no murmurs, rubs or gallops  Respiratory: clear to auscultation bilaterally, no wheezes, crackles or rhonchi, unlabored respirations  Abdomen: normoactive bowel sounds x4, non tender, nondistended  Ext:  No edema,   Neuro: A&O x3, no focal neurological deficits                                                --------------------------------------------------------------    ASSESSMENT & PLAN    Past medical history and hospital course     61 yo male, h/o type 2 DM on insulin, CAD s.p stent 2008, s/p CABG 2012 (Dr Smith), PAD s/p angioplasty and stent b/l LE, BPH, ESRD on HD through left AVF ([facility] follows with Dr MARY Paula), DFU sp left toe amputation, left calcaneal OM on daptomycin and cefepime post dialysis s.p wound Vac, chronic anemia, HFmrEF, presented to the hospital for chest pain     # ACS/CAD presenting with Chest pain  #CAD s/p PCI/CABG  #PAD s/p angioplasty  #HTN/HLD  #Chronic HFmrEF  Troponin 0.70 in setting of ESRD, 0.25 back in 09/2022  - Troponin stable between 0.7-0.8  - TTE LVEF 45-50%, Grade 3 diastolic dysfunction  NST: FIXED PERFUSION DEFECT INVOLVING ANTERIOR WALL OF THE LEFT VENTRICLE EXTENDING TO APEX CONSISTENT WITH SCAR. GLOBAL HYPOKINESIS WITH POOR THICKENING OF THE ANTERIOR WALL. EF  36 %   - c/w ASA 81 mg daily  - c/w Plavix 75 mg daily  - c/w atorvastatin 40 mg daily  - c/w Nifedipine 30 mg daily  EKG: Left anterior fascicular block ST & T wave abnormality, consider lateral ischemia. Prolonged QT      #NSVT  -11/16 PM 10 beats of NSVT observed on tele   -Started Metoprolol 12.5 BID  -Maintain K>4 and Mg>2    # Anemia of chronic disease  - hb on admission 6.6 baseline 7.6. s/p 1U PRBC  -11/21 pt got 1 unit pRBCs   - Monitor CBC, transfuse to keep >8  -maintain active T&S     # ESRD on HD   - HD per nephrology  - B/L pitting edema, little urine output    #Recent hx Left calcaneal OM   - Wound Cx 9/16 - Enterobacter cloacae  - wound CX 10/16 VRE Faecium Enterobacter cloacae complex, Proteus vulgaris, Morganella morganii   - s/p debridement 10/21 Wound Cx Proteus mirabilis, VRE faecium, Pseudomonas putida  - s/p debridement 10/24 Crumble L calcaneous   - s/p debridement 11/3 with wound vac in place  - Cefepime 2g post HD through 11/30; s/p 3 session of daptomycin post-HD  - On last admission, ID plan for 4 weeks from last debridement (11/3-11/30)  -Debridement 11/21 PM cancelled  -Rescheduled debridement for 11/25  -pt requested PT until surgery     # PAD  - s/p angiogram LLE with peroneal artery angioplasty and arterectomy 10/27  - Cont meds as above    # Type 2 DM  Continue insulin regimen    # BPH  - c/w Tamsulosin 0.4 mg daily    Pending: Podiatry procedure  -DVT prophylaxis: Heparin  -GI prophylaxis: Not indicated  -Diet: Renal, 1.2l fluid restriction  -Code status: Full code          # Handoff

## 2022-11-23 NOTE — PROGRESS NOTE ADULT - ASSESSMENT
63 yo male, h/o type 2 DM on insulin, CAD s.p stent 2008, s/p CABG 2012 (Dr Smith), PAD s/p angioplasty and stent b/l LE, BPH, ESRD on HD through left AVF (MWF follows with Dr MARY Paula), DFU sp left toe amputation, left calcaneal OM on daptomycin and cefepime post dialysis s.p wound Vac, chronic anemia, HFmrEF, presented to the hospital for chest pain. Found to have anemia s/p 1 unit of prbc.    ESRD on HD/ Anemia / Chest pain/ OM on abx  HD today 3h opti 160 UF 3l as tolerated    RAMSEY weekly post HD 60 mcg/ transfuse if Hb< 7  on sevelamer 800 mg po tid/ ph at goal / repeat please   on lasix non oliguric    BP controlled  abx for OM - Cefepime  s/p debridement with podiatry 11/21/cardiology f/up noted     will follow

## 2022-11-23 NOTE — PROGRESS NOTE ADULT - SUBJECTIVE AND OBJECTIVE BOX
SUBJECTIVE:    Patient is a 62y old Male who presents with a chief complaint of Chest pain (23 Nov 2022 10:13)    Currently admitted to medicine with the primary diagnosis of Anemia       Today is hospital day 9d.     PAST MEDICAL & SURGICAL HISTORY  CAD (coronary artery disease)  1 stent 2008    S/P CABG (coronary artery bypass graft)  x4    Diabetes mellitus    Transient ischemic attack (TIA)  2017; 2008    Chronic kidney disease (CKD)  Stage IV    Hypertension    Stented coronary artery  in 2008    Myocardial infarction  2012    PAD (peripheral artery disease)  S/p bypass left leg    HLD (hyperlipidemia)    BPH (benign prostatic hyperplasia)    Pain in left knee  s/p fall    OA (osteoarthritis)    [place] (hard of hearing)    Chronic anemia    S/P CABG (coronary artery bypass graft)  2012    H/O arterial bypass of lower limb  Left Lower Extremity (2016)    History of surgery  Left CEA (2017)  Left Pinkie toe Amputation (2014)  CABG x 4 (2012)  Card cath - stent (2008)      AV fistula  2019  LEFT AV FISTULA      ALLERGIES:  No Known Allergies    MEDICATIONS:  STANDING MEDICATIONS  aspirin enteric coated 81 milliGRAM(s) Oral daily  atorvastatin 40 milliGRAM(s) Oral at bedtime  cefepime   IVPB 2000 milliGRAM(s) IV Intermittent <User Schedule>  clopidogrel Tablet 75 milliGRAM(s) Oral daily  cyanocobalamin 1000 MICROGram(s) Oral daily  darbepoetin Injectable Syringe 60 MICROGram(s) IV Push <User Schedule>  dextrose 5%. 1000 milliLiter(s) IV Continuous <Continuous>  dextrose 5%. 1000 milliLiter(s) IV Continuous <Continuous>  dextrose 50% Injectable 25 Gram(s) IV Push once  dextrose 50% Injectable 12.5 Gram(s) IV Push once  dextrose 50% Injectable 25 Gram(s) IV Push once  famotidine    Tablet 20 milliGRAM(s) Oral daily  folic acid 1 milliGRAM(s) Oral daily  furosemide    Tablet 40 milliGRAM(s) Oral daily  glucagon  Injectable 1 milliGRAM(s) IntraMuscular once  heparin   Injectable 5000 Unit(s) SubCutaneous every 8 hours  influenza   Vaccine 0.5 milliLiter(s) IntraMuscular once  insulin glargine Injectable (LANTUS) 13 Unit(s) SubCutaneous at bedtime  insulin lispro (ADMELOG) corrective regimen sliding scale   SubCutaneous three times a day before meals  insulin lispro Injectable (ADMELOG) 5 Unit(s) SubCutaneous before breakfast  insulin lispro Injectable (ADMELOG) 5 Unit(s) SubCutaneous before lunch  metoprolol tartrate 12.5 milliGRAM(s) Oral every 12 hours  NIFEdipine XL 30 milliGRAM(s) Oral daily  regadenoson Injectable 0.4 milliGRAM(s) IV Push once  senna 2 Tablet(s) Oral at bedtime  sevelamer carbonate 800 milliGRAM(s) Oral three times a day with meals  tamsulosin 0.4 milliGRAM(s) Oral at bedtime    PRN MEDICATIONS  acetaminophen     Tablet .. 650 milliGRAM(s) Oral every 6 hours PRN  dextrose Oral Gel 15 Gram(s) Oral once PRN  melatonin 3 milliGRAM(s) Oral at bedtime PRN    VITALS:   T(F): 95.6  HR: 72  BP: 132/64  RR: 17  SpO2: --    LABS:                        8.4    8.78  )-----------( 258      ( 23 Nov 2022 06:13 )             26.2     11-23    130<L>  |  93<L>  |  48<H>  ----------------------------<  68<L>  4.2   |  24  |  5.8<HH>    Ca    8.3<L>      23 Nov 2022 06:13  Mg     2.0     11-23    TPro  7.5  /  Alb  3.3<L>  /  TBili  0.3  /  DBili  x   /  AST  6   /  ALT  <5  /  AlkPhos  191<H>  11-23                  RADIOLOGY:    PHYSICAL EXAM:  GEN: No acute distress  LUNGS: Clear to auscultation bilaterally   HEART: S1/S2 present. RRR.   ABD/ GI: Soft, non-tender, non-distended. Bowel sounds present  EXT: LT calcaneal fowl smelling wound  NEURO: AAOX3

## 2022-11-24 LAB
ALBUMIN SERPL ELPH-MCNC: 3.4 G/DL — LOW (ref 3.5–5.2)
ALP SERPL-CCNC: 191 U/L — HIGH (ref 30–115)
ALT FLD-CCNC: <5 U/L — SIGNIFICANT CHANGE UP (ref 0–41)
ANION GAP SERPL CALC-SCNC: 13 MMOL/L — SIGNIFICANT CHANGE UP (ref 7–14)
AST SERPL-CCNC: 6 U/L — SIGNIFICANT CHANGE UP (ref 0–41)
BASOPHILS # BLD AUTO: 0.08 K/UL — SIGNIFICANT CHANGE UP (ref 0–0.2)
BASOPHILS NFR BLD AUTO: 1 % — SIGNIFICANT CHANGE UP (ref 0–1)
BILIRUB SERPL-MCNC: 0.4 MG/DL — SIGNIFICANT CHANGE UP (ref 0.2–1.2)
BLD GP AB SCN SERPL QL: SIGNIFICANT CHANGE UP
BUN SERPL-MCNC: 38 MG/DL — HIGH (ref 10–20)
CALCIUM SERPL-MCNC: 8.1 MG/DL — LOW (ref 8.4–10.5)
CHLORIDE SERPL-SCNC: 93 MMOL/L — LOW (ref 98–110)
CHLORIDE SERPL-SCNC: 95 MMOL/L — LOW (ref 98–110)
CO2 SERPL-SCNC: 26 MMOL/L — SIGNIFICANT CHANGE UP (ref 17–32)
CREAT SERPL-MCNC: 5 MG/DL — CRITICAL HIGH (ref 0.7–1.5)
EGFR: 12 ML/MIN/1.73M2 — LOW
EOSINOPHIL # BLD AUTO: 0.18 K/UL — SIGNIFICANT CHANGE UP (ref 0–0.7)
EOSINOPHIL NFR BLD AUTO: 2.2 % — SIGNIFICANT CHANGE UP (ref 0–8)
GLUCOSE BLDC GLUCOMTR-MCNC: 148 MG/DL — HIGH (ref 70–99)
GLUCOSE BLDC GLUCOMTR-MCNC: 178 MG/DL — HIGH (ref 70–99)
GLUCOSE BLDC GLUCOMTR-MCNC: 180 MG/DL — HIGH (ref 70–99)
GLUCOSE BLDC GLUCOMTR-MCNC: 58 MG/DL — LOW (ref 70–99)
GLUCOSE SERPL-MCNC: 52 MG/DL — CRITICAL LOW (ref 70–99)
HCT VFR BLD CALC: 25.9 % — LOW (ref 42–52)
HGB BLD-MCNC: 8.5 G/DL — LOW (ref 14–18)
IMM GRANULOCYTES NFR BLD AUTO: 0.4 % — HIGH (ref 0.1–0.3)
LYMPHOCYTES # BLD AUTO: 0.83 K/UL — LOW (ref 1.2–3.4)
LYMPHOCYTES # BLD AUTO: 10.1 % — LOW (ref 20.5–51.1)
MAGNESIUM SERPL-MCNC: 2 MG/DL — SIGNIFICANT CHANGE UP (ref 1.8–2.4)
MCHC RBC-ENTMCNC: 30.1 PG — SIGNIFICANT CHANGE UP (ref 27–31)
MCHC RBC-ENTMCNC: 32.8 G/DL — SIGNIFICANT CHANGE UP (ref 32–37)
MCV RBC AUTO: 91.8 FL — SIGNIFICANT CHANGE UP (ref 80–94)
MONOCYTES # BLD AUTO: 0.7 K/UL — HIGH (ref 0.1–0.6)
MONOCYTES NFR BLD AUTO: 8.5 % — SIGNIFICANT CHANGE UP (ref 1.7–9.3)
NEUTROPHILS # BLD AUTO: 6.37 K/UL — SIGNIFICANT CHANGE UP (ref 1.4–6.5)
NEUTROPHILS NFR BLD AUTO: 77.8 % — HIGH (ref 42.2–75.2)
NRBC # BLD: 0 /100 WBCS — SIGNIFICANT CHANGE UP (ref 0–0)
PLATELET # BLD AUTO: 243 K/UL — SIGNIFICANT CHANGE UP (ref 130–400)
POTASSIUM SERPL-MCNC: 5 MMOL/L — SIGNIFICANT CHANGE UP (ref 3.5–5)
POTASSIUM SERPL-MCNC: 5.1 MMOL/L — HIGH (ref 3.5–5)
POTASSIUM SERPL-SCNC: 5 MMOL/L — SIGNIFICANT CHANGE UP (ref 3.5–5)
POTASSIUM SERPL-SCNC: 5.1 MMOL/L — HIGH (ref 3.5–5)
PROT SERPL-MCNC: 7 G/DL — SIGNIFICANT CHANGE UP (ref 6–8)
RBC # BLD: 2.82 M/UL — LOW (ref 4.7–6.1)
RBC # FLD: 15.6 % — HIGH (ref 11.5–14.5)
SODIUM SERPL-SCNC: 133 MMOL/L — LOW (ref 135–146)
SODIUM SERPL-SCNC: 134 MMOL/L — LOW (ref 135–146)
WBC # BLD: 8.19 K/UL — SIGNIFICANT CHANGE UP (ref 4.8–10.8)
WBC # FLD AUTO: 8.19 K/UL — SIGNIFICANT CHANGE UP (ref 4.8–10.8)

## 2022-11-24 PROCEDURE — 99232 SBSQ HOSP IP/OBS MODERATE 35: CPT | Mod: 24

## 2022-11-24 PROCEDURE — 99233 SBSQ HOSP IP/OBS HIGH 50: CPT

## 2022-11-24 PROCEDURE — 99232 SBSQ HOSP IP/OBS MODERATE 35: CPT

## 2022-11-24 RX ADMIN — HEPARIN SODIUM 5000 UNIT(S): 5000 INJECTION INTRAVENOUS; SUBCUTANEOUS at 05:16

## 2022-11-24 RX ADMIN — HEPARIN SODIUM 5000 UNIT(S): 5000 INJECTION INTRAVENOUS; SUBCUTANEOUS at 21:40

## 2022-11-24 RX ADMIN — Medication 1: at 16:49

## 2022-11-24 RX ADMIN — FAMOTIDINE 20 MILLIGRAM(S): 10 INJECTION INTRAVENOUS at 11:13

## 2022-11-24 RX ADMIN — Medication 12.5 MILLIGRAM(S): at 17:27

## 2022-11-24 RX ADMIN — Medication 30 MILLIGRAM(S): at 05:15

## 2022-11-24 RX ADMIN — TAMSULOSIN HYDROCHLORIDE 0.4 MILLIGRAM(S): 0.4 CAPSULE ORAL at 21:39

## 2022-11-24 RX ADMIN — CLOPIDOGREL BISULFATE 75 MILLIGRAM(S): 75 TABLET, FILM COATED ORAL at 11:09

## 2022-11-24 RX ADMIN — Medication 81 MILLIGRAM(S): at 11:09

## 2022-11-24 RX ADMIN — SEVELAMER CARBONATE 800 MILLIGRAM(S): 2400 POWDER, FOR SUSPENSION ORAL at 16:51

## 2022-11-24 RX ADMIN — Medication 12.5 MILLIGRAM(S): at 05:15

## 2022-11-24 RX ADMIN — Medication 5 UNIT(S): at 11:08

## 2022-11-24 RX ADMIN — PREGABALIN 1000 MICROGRAM(S): 225 CAPSULE ORAL at 11:09

## 2022-11-24 RX ADMIN — SEVELAMER CARBONATE 800 MILLIGRAM(S): 2400 POWDER, FOR SUSPENSION ORAL at 12:15

## 2022-11-24 RX ADMIN — HEPARIN SODIUM 5000 UNIT(S): 5000 INJECTION INTRAVENOUS; SUBCUTANEOUS at 13:55

## 2022-11-24 RX ADMIN — Medication 40 MILLIGRAM(S): at 05:15

## 2022-11-24 RX ADMIN — ATORVASTATIN CALCIUM 40 MILLIGRAM(S): 80 TABLET, FILM COATED ORAL at 21:39

## 2022-11-24 RX ADMIN — SENNA PLUS 2 TABLET(S): 8.6 TABLET ORAL at 21:40

## 2022-11-24 RX ADMIN — INSULIN GLARGINE 13 UNIT(S): 100 INJECTION, SOLUTION SUBCUTANEOUS at 21:38

## 2022-11-24 RX ADMIN — SEVELAMER CARBONATE 800 MILLIGRAM(S): 2400 POWDER, FOR SUSPENSION ORAL at 08:26

## 2022-11-24 RX ADMIN — Medication 1 MILLIGRAM(S): at 11:11

## 2022-11-24 NOTE — PROGRESS NOTE ADULT - ASSESSMENT
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 and cefepime post dialysis s.p wound Vac, chronic anemia, HFmrEF, presented to the hospital for chest pain     1.Recent hx Left calcaneal OM   - Wound Cx 9/16 - Enterobacter cloacae  - wound CX 10/16 VRE Faecium Enterobacter cloacae complex, Proteus vulgaris, Morganella morganii   - s/p debridement 10/21 Wound Cx Proteus mirabilis, VRE faecium, Pseudomonas putida  - s/p debridement 10/24 Crumble L calcaneous   - s/p debridement 11/3 with wound vac in place  - Cefepime 2g post HD through 11/30; s/p 3 session of daptomycin post-HD  - On last admission, ID plan for 4 weeks from last debridement (11/3-11/30)  -Debridement  for Friday 11/25/2022 -- patient has fowl smelling wound. on cefepime.     2. NSVT  -10 beats of NSVT observed on tele on 11/16 PM  -Started Metoprolol 12.5 BID  -Maintain K>4 and Mg>2    3. Anemia of chronic disease s/p 1 PRBC   - Monitor CBC, transfuse to keep >8    4.  ESRD on HD   - HD per nephrology  - B/L pitting edema, little urine output    5. Chest pain. hx of CAD s/p PCI/CABG and PAD s/p angioplasty  ACS/CAD presenting with Chest pain  -  on aspirin and plavix   - statin 40mg HS   - Troponin stable between 0.7-0.8  NST11/18/22: FIXED PERFUSION DEFECT INVOLVING ANTERIOR WALL OF THE LEFT VENTRICLE EXTENDING TO APEX CONSISTENT WITH SCAR. GLOBAL HYPOKINESIS WITH POOR THICKENING OF THE ANTERIOR WALL. EF  36 %   < from: TTE Echo Complete w/ Contrast w/ Doppler (11.18.22 @ 08:35) >  Mildly decreased global left ventricular systolic function with mild   concentric hypertrophy and normal internal cavity size.   2. With echocontrast, a moderate sized apical aneurysm is noted. There   is no evidence of LV thrombus.   3. Left ventricular ejection fraction, by visual estimation, is 45 to   50%.   4. Grade III diastolic dysfunction with elevated left atrial and left   ventricular end-diastolic pressures.    6.  PAD  - s/p angiogram LLE with peroneal artery angioplasty and arterectomy 10/27  - Cont meds as above    7. Type 2 DM  - Adjusted insulin due to hypoglycemia in am  - Lantus was decreased from 20u to 13unit     8.  BPH  - c/w Tamsulosin 0.4 mg daily    Pending: Podiatry procedure  -DVT prophylaxis: Heparin  -GI prophylaxis: Not indicated  -Diet: Renal, 1.2l fluid restriction  -Code status: Full code    Patient is at moderate risk for procedure under local anesthesia-- he will do dialysis same day if electrolytes ok can do procedure before HD.

## 2022-11-24 NOTE — PROGRESS NOTE ADULT - SUBJECTIVE AND OBJECTIVE BOX
SUBJECTIVE:    Patient is a 62y old Male who presents with a chief complaint of Chest pain (24 Nov 2022 10:24)    Currently admitted to medicine with the primary diagnosis of Anemia       Today is hospital day 10d.     PAST MEDICAL & SURGICAL HISTORY  CAD (coronary artery disease)  1 stent 2008    S/P CABG (coronary artery bypass graft)  x4    Diabetes mellitus    Transient ischemic attack (TIA)  2017; 2008    Chronic kidney disease (CKD)  Stage IV    Hypertension    Stented coronary artery  in 2008    Myocardial infarction  2012    PAD (peripheral artery disease)  S/p bypass left leg    HLD (hyperlipidemia)    BPH (benign prostatic hyperplasia)    Pain in left knee  s/p fall    OA (osteoarthritis)    [place] (hard of hearing)    Chronic anemia    S/P CABG (coronary artery bypass graft)  2012    H/O arterial bypass of lower limb  Left Lower Extremity (2016)    History of surgery  Left CEA (2017)  Left Pinkie toe Amputation (2014)  CABG x 4 (2012)  Card cath - stent (2008)      AV fistula  2019  LEFT AV FISTULA      ALLERGIES:  No Known Allergies    MEDICATIONS:  STANDING MEDICATIONS  aspirin enteric coated 81 milliGRAM(s) Oral daily  atorvastatin 40 milliGRAM(s) Oral at bedtime  cefepime   IVPB 2000 milliGRAM(s) IV Intermittent <User Schedule>  clopidogrel Tablet 75 milliGRAM(s) Oral daily  cyanocobalamin 1000 MICROGram(s) Oral daily  darbepoetin Injectable Syringe 60 MICROGram(s) IV Push <User Schedule>  dextrose 5%. 1000 milliLiter(s) IV Continuous <Continuous>  dextrose 5%. 1000 milliLiter(s) IV Continuous <Continuous>  dextrose 50% Injectable 25 Gram(s) IV Push once  dextrose 50% Injectable 12.5 Gram(s) IV Push once  dextrose 50% Injectable 25 Gram(s) IV Push once  famotidine    Tablet 20 milliGRAM(s) Oral daily  folic acid 1 milliGRAM(s) Oral daily  furosemide    Tablet 40 milliGRAM(s) Oral daily  glucagon  Injectable 1 milliGRAM(s) IntraMuscular once  heparin   Injectable 5000 Unit(s) SubCutaneous every 8 hours  influenza   Vaccine 0.5 milliLiter(s) IntraMuscular once  insulin glargine Injectable (LANTUS) 13 Unit(s) SubCutaneous at bedtime  insulin lispro (ADMELOG) corrective regimen sliding scale   SubCutaneous three times a day before meals  insulin lispro Injectable (ADMELOG) 5 Unit(s) SubCutaneous before breakfast  insulin lispro Injectable (ADMELOG) 5 Unit(s) SubCutaneous before lunch  metoprolol tartrate 12.5 milliGRAM(s) Oral every 12 hours  NIFEdipine XL 30 milliGRAM(s) Oral daily  regadenoson Injectable 0.4 milliGRAM(s) IV Push once  senna 2 Tablet(s) Oral at bedtime  sevelamer carbonate 800 milliGRAM(s) Oral three times a day with meals  tamsulosin 0.4 milliGRAM(s) Oral at bedtime    PRN MEDICATIONS  acetaminophen     Tablet .. 650 milliGRAM(s) Oral every 6 hours PRN  dextrose Oral Gel 15 Gram(s) Oral once PRN  melatonin 3 milliGRAM(s) Oral at bedtime PRN    VITALS:   T(F): 95.9  HR: 85  BP: 149/73  RR: 19  SpO2: 98%    LABS:                        8.5    8.19  )-----------( 243      ( 24 Nov 2022 07:18 )             25.9     11-24    134<L>  |  95<L>  |  38<H>  ----------------------------<  52<LL>  5.0   |  26  |  5.0<HH>    Ca    8.1<L>      24 Nov 2022 07:18  Mg     2.0     11-24    TPro  7.0  /  Alb  3.4<L>  /  TBili  0.4  /  DBili  x   /  AST  6   /  ALT  <5  /  AlkPhos  191<H>  11-24                  RADIOLOGY:    PHYSICAL EXAM:  GEN: No acute distress  LUNGS: Clear to auscultation bilaterally   HEART: S1/S2 present. RRR.   ABD/ GI: Soft, non-tender, non-distended. Bowel sounds present  EXT: NC/NC/NE/2+PP/MARQUES  NEURO: AAOX3

## 2022-11-24 NOTE — PROGRESS NOTE ADULT - SUBJECTIVE AND OBJECTIVE BOX
Podiatry Progress Note    Subjective:  SCHWABACHER, LAWRENCE is a  62y Male.   Seen bedside.   Patient is a 62y old  Male who presents with a chief complaint of Chest pain (24 Nov 2022 07:55)      Past Medical History and Surgical History  PAST MEDICAL & SURGICAL HISTORY:  CAD (coronary artery disease)  1 stent 2008      S/P CABG (coronary artery bypass graft)  x4      Diabetes mellitus      Transient ischemic attack (TIA)  2017; 2008      Chronic kidney disease (CKD)  Stage IV      Hypertension      Stented coronary artery  in 2008      Myocardial infarction  2012      PAD (peripheral artery disease)  S/p bypass left leg      HLD (hyperlipidemia)      BPH (benign prostatic hyperplasia)      Pain in left knee  s/p fall      OA (osteoarthritis)      [place] (hard of hearing)      Chronic anemia      S/P CABG (coronary artery bypass graft)  2012      H/O arterial bypass of lower limb  Left Lower Extremity (2016)      History of surgery  Left CEA (2017)  Left Pinkie toe Amputation (2014)  CABG x 4 (2012)  Card cath - stent (2008)        AV fistula  2019  LEFT AV FISTULA           Objective:  Vital Signs Last 24 Hrs  T(C): 35.5 (24 Nov 2022 08:00), Max: 36.4 (23 Nov 2022 23:22)  T(F): 95.9 (24 Nov 2022 08:00), Max: 97.5 (23 Nov 2022 23:22)  HR: 85 (24 Nov 2022 08:00) (72 - 94)  BP: 149/73 (24 Nov 2022 08:00) (132/64 - 150/76)  BP(mean): --  RR: 19 (24 Nov 2022 08:00) (17 - 19)  SpO2: 98% (23 Nov 2022 23:22) (98% - 98%)    Parameters below as of 23 Nov 2022 23:22  Patient On (Oxygen Delivery Method): room air                            8.5    8.19  )-----------( 243      ( 24 Nov 2022 07:18 )             25.9                 11-24    134<L>  |  95<L>  |  38<H>  ----------------------------<  52<LL>  5.0   |  26  |  5.0<HH>    Ca    8.1<L>      24 Nov 2022 07:18  Mg     2.0     11-24    TPro  7.0  /  Alb  3.4<L>  /  TBili  0.4  /  DBili  x   /  AST  6   /  ALT  <5  /  AlkPhos  191<H>  11-24        Physical Exam - Lower Extremity Focused:   Derm: Derm:  Open Left Plantar Ulcer @ Plantar Medial Rearfoot;    -Wound Base Fibrogranular; 50% Fibrous, 50% Granular; moderate serosanguinous drainage    -Wound Margins Irregular; mild macerated borders    - measures 7.0 x 5.5 x 2.4cm;   Mild Cellulitis w/ Erythema of Left Lower Extremity; improved since previous visit on 11/7/22;  Noted mild decrease in periwound erythema  Vascular: DP and PT Pulses Diminished; Foot is Warm to Warm to the touch; Capillary Refill Time < 3 Seconds;    Neuro: Protective Sensation Diminished / Moderately Neuropathic   MSK: No Pain On Palpation at Wound Site, Charcot deformity    Assessment:  - S/P Excisional Debridement of Soft Tissue & Bone, Left Foot; (DOS: 10/21/22)   - S/P exc dbx st/b Left foot 10/24/22  - S/P exc dbx st/b Left foot 11/3/22  - Left foot OM/Septic arthritis     Plan:  Chart reviewed and Patient evaluated. All Questions and Concerns Addressed and Answered  Local Wound Care; Wound Flushed w/ NS; Wound Packed w/ Xeroform / DSD / Kerlix /ACE  Weight Bearing Status; WBAT w/ Heel Touch w/ Surgical Shoe;   Continue w/ Local Wound Care; Q24 Dressing Changes;  - Recommended L BKA, pt will consider it and discuss it with family  - Possible surgical debridement If the patient still in the hospital over the weekend, depends on the OR availability   Patient Stable Per Podiatry Standpoint; Follow Up as an Outpatient w/   ; Bharathi   Discussed Plan w/ Attending;     Podiatry          Podiatry Progress Note    Subjective:  SCHWABACHER, LAWRENCE is a  62y Male.   Seen bedside.   Patient is a 62y old  Male who presents with a chief complaint of Chest pain (24 Nov 2022 07:55)      Past Medical History and Surgical History  PAST MEDICAL & SURGICAL HISTORY:  CAD (coronary artery disease)  1 stent 2008      S/P CABG (coronary artery bypass graft)  x4      Diabetes mellitus      Transient ischemic attack (TIA)  2017; 2008      Chronic kidney disease (CKD)  Stage IV      Hypertension      Stented coronary artery  in 2008      Myocardial infarction  2012      PAD (peripheral artery disease)  S/p bypass left leg      HLD (hyperlipidemia)      BPH (benign prostatic hyperplasia)      Pain in left knee  s/p fall      OA (osteoarthritis)      [place] (hard of hearing)      Chronic anemia      S/P CABG (coronary artery bypass graft)  2012      H/O arterial bypass of lower limb  Left Lower Extremity (2016)      History of surgery  Left CEA (2017)  Left Pinkie toe Amputation (2014)  CABG x 4 (2012)  Card cath - stent (2008)        AV fistula  2019  LEFT AV FISTULA           Objective:  Vital Signs Last 24 Hrs  T(C): 35.5 (24 Nov 2022 08:00), Max: 36.4 (23 Nov 2022 23:22)  T(F): 95.9 (24 Nov 2022 08:00), Max: 97.5 (23 Nov 2022 23:22)  HR: 85 (24 Nov 2022 08:00) (72 - 94)  BP: 149/73 (24 Nov 2022 08:00) (132/64 - 150/76)  BP(mean): --  RR: 19 (24 Nov 2022 08:00) (17 - 19)  SpO2: 98% (23 Nov 2022 23:22) (98% - 98%)    Parameters below as of 23 Nov 2022 23:22  Patient On (Oxygen Delivery Method): room air                            8.5    8.19  )-----------( 243      ( 24 Nov 2022 07:18 )             25.9                 11-24    134<L>  |  95<L>  |  38<H>  ----------------------------<  52<LL>  5.0   |  26  |  5.0<HH>    Ca    8.1<L>      24 Nov 2022 07:18  Mg     2.0     11-24    TPro  7.0  /  Alb  3.4<L>  /  TBili  0.4  /  DBili  x   /  AST  6   /  ALT  <5  /  AlkPhos  191<H>  11-24        Physical Exam - Lower Extremity Focused:   Derm: Derm:  Open Left Plantar Ulcer @ Plantar Medial Rearfoot;    -Wound Base Fibrogranular; 50% Fibrous, 50% Granular; moderate serosanguinous drainage    -Wound Margins Irregular; mild macerated borders    - measures 7.0 x 5.5 x 2.4cm;   Mild Cellulitis w/ Erythema of Left Lower Extremity; improved since previous visit on 11/7/22;  Noted mild decrease in periwound erythema  Vascular: DP and PT Pulses Diminished; Foot is Warm to Warm to the touch; Capillary Refill Time < 3 Seconds;    Neuro: Protective Sensation Diminished / Moderately Neuropathic   MSK: No Pain On Palpation at Wound Site, Charcot deformity    Assessment:  - S/P Excisional Debridement of Soft Tissue & Bone, Left Foot; (DOS: 10/21/22)   - S/P exc dbx st/b Left foot 10/24/22  - S/P exc dbx st/b Left foot 11/3/22  - Left foot OM/Septic arthritis     Plan:  Chart reviewed and Patient evaluated. All Questions and Concerns Addressed and Answered  Local Wound Care; Wound Flushed w/ NS; Wound Packed w/ Xeroform / DSD / Kerlix /ACE  Weight Bearing Status; WBAT w/ Heel Touch w/ Surgical Shoe;   Continue w/ Local Wound Care; Q24 Dressing Changes;  - Recommended L BKA, pt will consider it and discuss it with family  - Surgical debridement Friday 11/25, Please make the Pt NPO 11/24 by MN  - Obtain medical clearance If not yet obtained, order T&S, Coag studies  - Optimize lab values prior to OR    Discussed Plan w/ Attending; Dr. Garvin    Podiatry

## 2022-11-24 NOTE — PROGRESS NOTE ADULT - SCRIBE NAME
Patient is a 83y old  Male who presents with a chief complaint of unresponsiveness (24 Jan 2018 04:33)      INTERVAL HPI/ OVERNIGHT EVENTS: Pt was seen and examined at bedside today, No significant overnight events, Pt denies any pain, but admits to some SOB due to his secretions, I suctioned the patient at bedside.       MEDICATIONS  (STANDING):  aspirin  chewable 81 milliGRAM(s) Oral daily  atorvastatin 40 milliGRAM(s) Oral at bedtime  chlorhexidine 4% Liquid 1 Application(s) Topical daily  dextrose 5%. 1000 milliLiter(s) (50 mL/Hr) IV Continuous <Continuous>  dextrose 50% Injectable 12.5 Gram(s) IV Push once  dextrose 50% Injectable 25 Gram(s) IV Push once  dextrose 50% Injectable 25 Gram(s) IV Push once  docusate sodium Liquid 100 milliGRAM(s) Oral two times a day  doxazosin 4 milliGRAM(s) Oral at bedtime  finasteride 5 milliGRAM(s) Oral daily  heparin  Injectable 5000 Unit(s) SubCutaneous every 12 hours  insulin lispro (HumaLOG) corrective regimen sliding scale   SubCutaneous every 6 hours  metoclopramide Injectable 10 milliGRAM(s) IV Push every 8 hours  metoprolol     tartrate 25 milliGRAM(s) Oral two times a day  pantoprazole  Injectable 40 milliGRAM(s) IV Push daily  valACYclovir 500 milliGRAM(s) Oral every 8 hours    MEDICATIONS  (PRN):  acetaminophen   Tablet 650 milliGRAM(s) Oral every 6 hours PRN For Temp greater than 38 C (100.4 F)  acetaminophen   Tablet. 650 milliGRAM(s) Oral every 6 hours PRN Moderate Pain (4 - 6)  ALBUTerol/ipratropium for Nebulization 3 milliLiter(s) Nebulizer every 6 hours PRN Shortness of Breath and/or Wheezing  aluminum hydroxide/magnesium hydroxide/simethicone Suspension 30 milliLiter(s) Oral every 6 hours PRN Dyspepsia  dextrose Gel 1 Dose(s) Oral once PRN Blood Glucose LESS THAN 70 milliGRAM(s)/deciliter  glucagon  Injectable 1 milliGRAM(s) IntraMuscular once PRN Glucose LESS THAN 70 milligrams/deciliter      Allergies    No Known Allergies    Intolerances        REVIEW OF SYSTEMS:    Unable to examine due to [ ] Altered Mental Status [ ] Advanced Dementia [ ] Expressive Aphasia [ ] Non-verbal patient    CONSTITUTIONAL: No fever, + generalized weakness/Fatigue, No weight loss  EYES: No eye pain, visual disturbances, or discharge  ENMT:  No difficulty hearing, tinnitus, vertigo; No sinus or throat pain  NECK: No pain or stiffness  RESPIRATORY: + shortness of breath, +secretions, + cough, No wheezing, sputum or hemoptysis   CARDIOVASCULAR: No chest pain, palpitations, or leg swelling  GASTROINTESTINAL: No abdominal pain. No nausea, vomiting, diarrhea or constipation. No melena or hematochezia.  GENITOURINARY: No dysuria, frequency, hematuria, or incontinence  NEUROLOGICAL: No headaches, Dizziness, memory loss, loss of strength, numbness, or tremors  SKIN: No itching, burning, rashes, or lesions   MUSCULOSKELETAL: No joint pain or swelling; No muscle, back, or extremity pain  PSYCHIATRIC: No depression, anxiety, mood swings, or difficulty sleeping  HEME/LYMPH: No easy bruising, or bleeding gums  ALLERGY AND IMMUNOLOGIC: No hives or eczema    Vital Signs Last 24 Hrs  T(C): 36.7 (26 Feb 2018 17:41), Max: 36.9 (25 Feb 2018 23:59)  T(F): 98.1 (26 Feb 2018 17:41), Max: 98.5 (25 Feb 2018 23:59)  HR: 85 (26 Feb 2018 19:58) (73 - 85)  BP: 108/57 (26 Feb 2018 17:41) (108/57 - 114/63)  BP(mean): --  RR: 17 (26 Feb 2018 17:41) (17 - 19)  SpO2: 99% (26 Feb 2018 19:58) (97% - 99%)    PHYSICAL EXAM:  GENERAL: Trach-vent, Cachetic   HEAD:  Atraumatic, Normocephalic  EYES:  conjunctiva and sclera clear  ENMT: Moist mucous membranes  NECK: Trach, +secretions   CHEST/LUNG: bilateral scattered rales, NO rhonchi, wheezing, or rubs  HEART: Regular rate and rhythm; No murmurs, rubs, or gallops  ABDOMEN: Soft, Nontender, Nondistended; Bowel sounds present  EXTREMITIES:  2+ Peripheral Pulses, No clubbing, cyanosis, or edema  NERVOUS SYSTEM:  Alert & Oriented X3, Good concentration    LABS:              CAPILLARY BLOOD GLUCOSE      POCT Blood Glucose.: 145 mg/dL (26 Feb 2018 17:05)  POCT Blood Glucose.: 142 mg/dL (26 Feb 2018 11:51)  POCT Blood Glucose.: 138 mg/dL (26 Feb 2018 05:40)  POCT Blood Glucose.: 140 mg/dL (26 Feb 2018 01:02)  POCT Blood Glucose.: 160 mg/dL (25 Feb 2018 23:49)          RADIOLOGY & ADDITIONAL TESTS:          Imaging Personally Reviewed:  [ ] YES  [ ] NO    Consultant(s) Notes Reviewed:  [x ] YES  [ ] NO    Care Discussed with Consultants/Other Providers [x ] YES  [ ] NO
Hima Richards
Hima Richards

## 2022-11-24 NOTE — PROGRESS NOTE ADULT - SUBJECTIVE AND OBJECTIVE BOX
LAWRENCE SCHWABACHER 62y Male  MRN#: [id number]   CODE STATUS:________    Hospital Day: 10d    Pt is currently admitted with the primary diagnosis of     SUBJECTIVE  Hospital course:    63 yo male, h/o type 2 DM on insulin, CAD s.p stent 2008, s/p CABG 2012 (Dr Smith), PAD s/p angioplasty and stent b/l LE, BPH, ESRD on HD through left AVF (MWF follows with Dr MARY Paula), DFU sp left toe amputation, left calcaneal OM on daptomycin and cefepime post dialysis s.p wound Vac, chronic anemia, HFmrEF, presented to the hospital for chest pain   Pain is mid sternal, pressure like, discomfort rather than pain, occurring at rest, non radiating, exacerbated by cough, not similar to previous episodes of chest pain, not associated with tremor , diaphoresis, numbness, lightheadedness; Pain has improved with tylenol initially and then resolved with nitroglycerin SL   Patient denies fever, chills, shortness of breath, sputum, abdominal pain; ROS otherwise negative    Troponin 0.70 in setting of ESRD, 0.25 back in 09/2022  - Troponin stable between 0.7-0.8  - TTE LVEF 45-50%, Grade 3 diastolic dysfunction  NST:  NO EVIDENCE FOR ISCHEMIA DURING LEXISCAN INFUSION. FIXED PERFUSION   DEFECT INVOLVING ANTERIOR WALL OF THE LEFT VENTRICLE EXTENDING TO APEX   CONSISTENT WITH SCAR. GLOBAL HYPOKINESIS WITH POOR THICKENING OF THE ANTERIOR WALL. LEFT VENTRICULAR EJECTION FRACTION OF  36 % WHICH IS LOW.  - c/w ASA 81 mg daily- c/w Plavix 75 mg daily- c/w atorvastatin 40 mg daily- c/w Nifedipine 30 mg daily  EKG: Left anterior fascicular block ST & T wave abnormality, consider lateral ischemia. Prolonged QT      Overnight events: this am pt had fingerstick of 58, given apple juice     Subjective complaints: none    Present Today:   - [place]:  No [  ], Yes [   ] : Indication:     - Type of IV Access:       .. CVC/Piccline:  No [  ], Yes [   ] : Indication:       .. Midline: No [  ], Yes [   ] : Indication:                                             ----------------------------------------------------------  OBJECTIVE  PAST MEDICAL & SURGICAL HISTORY  CAD (coronary artery disease)  1 stent 2008    S/P CABG (coronary artery bypass graft)  x4    Diabetes mellitus    Transient ischemic attack (TIA)  2017; 2008    Chronic kidney disease (CKD)  Stage IV    Hypertension    Stented coronary artery  in 2008    Myocardial infarction  2012    PAD (peripheral artery disease)  S/p bypass left leg    HLD (hyperlipidemia)    BPH (benign prostatic hyperplasia)    Pain in left knee  s/p fall    OA (osteoarthritis)    [place] (hard of hearing)    Chronic anemia    S/P CABG (coronary artery bypass graft)  2012    H/O arterial bypass of lower limb  Left Lower Extremity (2016)    History of surgery  Left CEA (2017)  Left Pinkie toe Amputation (2014)  CABG x 4 (2012)  Card cath - stent (2008)      AV fistula  2019  LEFT AV FISTULA                                              -----------------------------------------------------------  ALLERGIES:  No Known Allergies                                            ------------------------------------------------------------    HOME MEDICATIONS  Home Medications:  aspirin 81 mg oral tablet: 1 tab(s) orally once a day (14 Sep 2022 17:42)  cefepime 2 g intravenous injection: 2 granules intravenous Monday, Wednesday, and Friday post dialysis (14 Nov 2022 08:01)  DAPTOmycin 500 mg intravenous injection: 900 milligram(s) intravenous Monday, Wednesday, and Friday post dialysis (14 Nov 2022 08:02)  furosemide 40 mg oral tablet: 1 tab(s) orally once a day (14 Nov 2022 07:59)  Levemir 100 units/mL subcutaneous solution: 40 unit(s) subcutaneous once a day (at bedtime) (14 Sep 2022 17:42)  NIFEdipine 30 mg oral tablet, extended release: 1 tab(s) orally once a day (07 Nov 2022 13:45)  nitroglycerin 0.4 mg sublingual spray: 1 tab(s) sublingual every 5 minutes, As Needed up to 3 tabs (14 Nov 2022 08:05)  Plavix 75 mg oral tablet: 1 tab(s) orally once a day (14 Sep 2022 17:42)  Senna 8.6 mg oral tablet: 2 tab(s) orally once a day (at bedtime) (14 Nov 2022 08:03)  sevelamer carbonate 800 mg oral tablet: 1 tab(s) orally 3 times a day (with meals) (20 Sep 2022 11:36)  tamsulosin 0.4 mg oral capsule: 1 cap(s) orally once a day (at bedtime) (07 Nov 2022 13:45)  Trulicity Pen 1.5 mg/0.5 mL subcutaneous solution: 1.5  subcutaneous once a week (14 Sep 2022 17:42)  Tylenol 325 mg oral tablet: 2 tab(s) orally every 6 hours, As Needed (14 Nov 2022 08:03)                           MEDICATIONS:  STANDING MEDICATIONS  aspirin enteric coated 81 milliGRAM(s) Oral daily  atorvastatin 40 milliGRAM(s) Oral at bedtime  cefepime   IVPB 2000 milliGRAM(s) IV Intermittent <User Schedule>  clopidogrel Tablet 75 milliGRAM(s) Oral daily  cyanocobalamin 1000 MICROGram(s) Oral daily  darbepoetin Injectable Syringe 60 MICROGram(s) IV Push <User Schedule>  dextrose 5%. 1000 milliLiter(s) IV Continuous <Continuous>  dextrose 5%. 1000 milliLiter(s) IV Continuous <Continuous>  dextrose 50% Injectable 25 Gram(s) IV Push once  dextrose 50% Injectable 12.5 Gram(s) IV Push once  dextrose 50% Injectable 25 Gram(s) IV Push once  famotidine    Tablet 20 milliGRAM(s) Oral daily  folic acid 1 milliGRAM(s) Oral daily  furosemide    Tablet 40 milliGRAM(s) Oral daily  glucagon  Injectable 1 milliGRAM(s) IntraMuscular once  heparin   Injectable 5000 Unit(s) SubCutaneous every 8 hours  influenza   Vaccine 0.5 milliLiter(s) IntraMuscular once  insulin glargine Injectable (LANTUS) 13 Unit(s) SubCutaneous at bedtime  insulin lispro (ADMELOG) corrective regimen sliding scale   SubCutaneous three times a day before meals  insulin lispro Injectable (ADMELOG) 5 Unit(s) SubCutaneous before breakfast  insulin lispro Injectable (ADMELOG) 5 Unit(s) SubCutaneous before lunch  metoprolol tartrate 12.5 milliGRAM(s) Oral every 12 hours  NIFEdipine XL 30 milliGRAM(s) Oral daily  regadenoson Injectable 0.4 milliGRAM(s) IV Push once  senna 2 Tablet(s) Oral at bedtime  sevelamer carbonate 800 milliGRAM(s) Oral three times a day with meals  tamsulosin 0.4 milliGRAM(s) Oral at bedtime    PRN MEDICATIONS  acetaminophen     Tablet .. 650 milliGRAM(s) Oral every 6 hours PRN  dextrose Oral Gel 15 Gram(s) Oral once PRN  melatonin 3 milliGRAM(s) Oral at bedtime PRN                                            ------------------------------------------------------------    11-23-22 @ 07:01  -  11-24-22 @ 07:00  --------------------------------------------------------  IN: 1290 mL / OUT: 3000 mL / NET: -1710 mL                                                 --------------------------------------------------------------  LABS:                        8.4    8.78  )-----------( 258      ( 23 Nov 2022 06:13 )             26.2     11-23    130<L>  |  93<L>  |  48<H>  ----------------------------<  68<L>  4.2   |  24  |  5.8<HH>    Ca    8.3<L>      23 Nov 2022 06:13  Mg     2.0     11-23    TPro  7.5  /  Alb  3.3<L>  /  TBili  0.3  /  DBili  x   /  AST  6   /  ALT  <5  /  AlkPhos  191<H>  11-23                                                                -------------------------------------------------------------  RADIOLOGY:                                            --------------------------------------------------------------  VITAL SIGNS: Last 24 Hours  T(C): 36.4 (24 Nov 2022 04:23), Max: 36.4 (23 Nov 2022 23:22)  T(F): 97.5 (24 Nov 2022 04:23), Max: 97.5 (23 Nov 2022 23:22)  HR: 94 (24 Nov 2022 04:23) (72 - 94)  BP: 150/76 (24 Nov 2022 04:23) (132/64 - 154/73)  BP(mean): --  RR: 18 (24 Nov 2022 04:23) (17 - 19)  SpO2: 98% (23 Nov 2022 23:22) (98% - 98%)    PHYSICAL EXAM:  General: Awake, oriented and resting comfortably, no acute distress  Head: normocephalic, atraumatic  ENT:  moist mucous membranes  Cardiac: regular rate and rhythm, normal S1 and S2, no murmurs, rubs or gallops  Respiratory: clear to auscultation bilaterally, no wheezes, crackles or rhonchi, unlabored respirations  Abdomen: normoactive bowel sounds x4, non tender, nondistended  Ext:  No edema, bandages clean  Neuro: A&O x3, no focal neurological deficits                                                --------------------------------------------------------------    ASSESSMENT & PLAN    Past medical history and hospital course     63 yo male, h/o type 2 DM on insulin, CAD s.p stent 2008, s/p CABG 2012 (Dr Smith), PAD s/p angioplasty and stent b/l LE, BPH, ESRD on HD through left AVF (MWF follows with Dr MARY Paula), DFU sp left toe amputation, left calcaneal OM on daptomycin and cefepime post dialysis s.p wound Vac, chronic anemia, HFmrEF, presented to the hospital for chest pain     # ACS/CAD presenting with Chest pain  #CAD s/p PCI/CABG  #PAD s/p angioplasty  #HTN/HLD  #Chronic HFmrEF  Troponin 0.70 in setting of ESRD, 0.25 back in 09/2022  - Troponin stable between 0.7-0.8  - TTE LVEF 45-50%, Grade 3 diastolic dysfunction  NST: FIXED PERFUSION DEFECT INVOLVING ANTERIOR WALL OF THE LEFT VENTRICLE EXTENDING TO APEX CONSISTENT WITH SCAR. GLOBAL HYPOKINESIS WITH POOR THICKENING OF THE ANTERIOR WALL. EF  36 %   - c/w ASA 81 mg daily  - c/w Plavix 75 mg daily  - c/w atorvastatin 40 mg daily  - c/w Nifedipine 30 mg daily  EKG: Left anterior fascicular block ST & T wave abnormality, consider lateral ischemia. Prolonged QT      #NSVT  -11/16 PM 10 beats of NSVT observed on tele   -Started Metoprolol 12.5 BID  -Maintain K>4 and Mg>2    # Anemia of chronic disease  - hb on admission 6.6 baseline 7.6. s/p 1U PRBC  -11/21 pt got 1 unit pRBCs   - Monitor CBC, transfuse to keep >8  -maintain active T&S     # ESRD on HD   - HD per nephrology  - B/L pitting edema, little urine output    #Recent hx Left calcaneal OM   - Wound Cx 9/16 - Enterobacter cloacae  - wound CX 10/16 VRE Faecium Enterobacter cloacae complex, Proteus vulgaris, Morganella morganii   - s/p debridement 10/21 Wound Cx Proteus mirabilis, VRE faecium, Pseudomonas putida  - s/p debridement 10/24 Crumble L calcaneous   - s/p debridement 11/3 with wound vac in place  - Cefepime 2g post HD through 11/30; s/p 3 session of daptomycin post-HD  - On last admission, ID plan for 4 weeks from last debridement (11/3-11/30)  -Debridement 11/21 PM cancelled  -Rescheduled debridement for 11/25; cancelled again, pending rescheduled surgery   -pt requested PT until surgery     # PAD  - s/p angiogram LLE with peroneal artery angioplasty and arterectomy 10/27  - Cont meds as above    # Type 2 DM  Continue insulin regimen    # BPH  - c/w Tamsulosin 0.4 mg daily    Pending: Podiatry procedure  -DVT prophylaxis: Heparin  -GI prophylaxis: Not indicated  -Diet: Renal, 1.2l fluid restriction  -Code status: Full code          # Handoff           # Handoff

## 2022-11-24 NOTE — PROGRESS NOTE ADULT - SUBJECTIVE AND OBJECTIVE BOX
MATILDE FOLLOW UP NOTE  --------------------------------------------------------------------------------  Chief Complaint:    24 hour events/subjective:        PAST HISTORY  --------------------------------------------------------------------------------  No significant changes to PMH, PSH, FHx, SHx, unless otherwise noted    ALLERGIES & MEDICATIONS  --------------------------------------------------------------------------------  Allergies    No Known Allergies    Intolerances      Standing Inpatient Medications  aspirin enteric coated 81 milliGRAM(s) Oral daily  atorvastatin 40 milliGRAM(s) Oral at bedtime  cefepime   IVPB 2000 milliGRAM(s) IV Intermittent <User Schedule>  clopidogrel Tablet 75 milliGRAM(s) Oral daily  cyanocobalamin 1000 MICROGram(s) Oral daily  darbepoetin Injectable Syringe 60 MICROGram(s) IV Push <User Schedule>  dextrose 5%. 1000 milliLiter(s) IV Continuous <Continuous>  dextrose 5%. 1000 milliLiter(s) IV Continuous <Continuous>  dextrose 50% Injectable 25 Gram(s) IV Push once  dextrose 50% Injectable 12.5 Gram(s) IV Push once  dextrose 50% Injectable 25 Gram(s) IV Push once  famotidine    Tablet 20 milliGRAM(s) Oral daily  folic acid 1 milliGRAM(s) Oral daily  furosemide    Tablet 40 milliGRAM(s) Oral daily  glucagon  Injectable 1 milliGRAM(s) IntraMuscular once  heparin   Injectable 5000 Unit(s) SubCutaneous every 8 hours  influenza   Vaccine 0.5 milliLiter(s) IntraMuscular once  insulin glargine Injectable (LANTUS) 13 Unit(s) SubCutaneous at bedtime  insulin lispro (ADMELOG) corrective regimen sliding scale   SubCutaneous three times a day before meals  insulin lispro Injectable (ADMELOG) 5 Unit(s) SubCutaneous before breakfast  insulin lispro Injectable (ADMELOG) 5 Unit(s) SubCutaneous before lunch  metoprolol tartrate 12.5 milliGRAM(s) Oral every 12 hours  NIFEdipine XL 30 milliGRAM(s) Oral daily  regadenoson Injectable 0.4 milliGRAM(s) IV Push once  senna 2 Tablet(s) Oral at bedtime  sevelamer carbonate 800 milliGRAM(s) Oral three times a day with meals  tamsulosin 0.4 milliGRAM(s) Oral at bedtime    PRN Inpatient Medications  acetaminophen     Tablet .. 650 milliGRAM(s) Oral every 6 hours PRN  dextrose Oral Gel 15 Gram(s) Oral once PRN  melatonin 3 milliGRAM(s) Oral at bedtime PRN      REVIEW OF SYSTEMS  --------------------------------------------------------------------------------  Gen: No weight changes, fatigue, fevers/chills, weakness  Skin: No rashes  Head/Eyes/Ears/Mouth: No headache; Normal hearing; Normal vision w/o blurriness; No sinus pain/discomfort, sore throat  Respiratory: No dyspnea, cough, wheezing, hemoptysis  CV: No chest pain, PND, orthopnea  GI: No abdominal pain, diarrhea, constipation, nausea, vomiting, melena, hematochezia  : No increased frequency, dysuria, hematuria, nocturia  MSK: No joint pain/swelling; no back pain; no edema  Neuro: No dizziness/lightheadedness, weakness, seizures, numbness, tingling  Heme: No easy bruising or bleeding  Endo: No heat/cold intolerance  Psych: No significant nervousness, anxiety, stress, depression    All other systems were reviewed and are negative, except as noted.    VITALS/PHYSICAL EXAM  --------------------------------------------------------------------------------  T(C): 35.5 (11-24-22 @ 08:00), Max: 36.4 (11-23-22 @ 23:22)  HR: 85 (11-24-22 @ 08:00) (72 - 94)  BP: 149/73 (11-24-22 @ 08:00) (132/64 - 150/76)  RR: 19 (11-24-22 @ 08:00) (17 - 19)  SpO2: 98% (11-23-22 @ 23:22) (98% - 98%)  Wt(kg): --        11-23-22 @ 07:01  -  11-24-22 @ 07:00  --------------------------------------------------------  IN: 1290 mL / OUT: 3000 mL / NET: -1710 mL      Physical Exam:  	Gen: NAD, well-appearing  	HEENT: PERRL, supple neck, clear oropharynx  	Pulm: CTA B/L  	CV: RRR, S1S2; no rub  	Back: No spinal or CVA tenderness; no sacral edema  	Abd: +BS, soft, nontender/nondistended  	: No suprapubic tenderness  	UE: Warm, FROM, no clubbing, intact strength; no edema; no asterixis  	LE: Warm, FROM, no clubbing, intact strength; no edema  	Neuro: No focal deficits, intact gait  	Psych: Normal affect and mood  	Skin: Warm, without rashes  	Vascular access:    LABS/STUDIES  --------------------------------------------------------------------------------              8.5    8.19  >-----------<  243      [11-24-22 @ 07:18]              25.9     134  |  95  |  38  ----------------------------<  52      [11-24-22 @ 07:18]  5.0   |  26  |  5.0        Ca     8.1     [11-24-22 @ 07:18]      Mg     2.0     [11-24-22 @ 07:18]    TPro  7.0  /  Alb  3.4  /  TBili  0.4  /  DBili  x   /  AST  6   /  ALT  <5  /  AlkPhos  191  [11-24-22 @ 07:18]          Creatinine Trend:  SCr 5.0 [11-24 @ 07:18]  SCr 5.8 [11-23 @ 06:13]  SCr 5.2 [11-22 @ 07:46]  SCr 6.8 [11-21 @ 07:41]  SCr 6.0 [11-20 @ 04:30]        Iron 39, TIBC 133, %sat 29      [10-21-22 @ 10:30]  Ferritin 1126      [10-21-22 @ 10:30]  PTH -- (Ca 7.5)      [02-26-22 @ 16:00]   312  Vitamin D (25OH) 21      [02-26-22 @ 16:00]  HbA1c 5.8      [01-30-20 @ 06:46]  Lipid: chol 81, , HDL 22, LDL --      [10-15-22 @ 09:53]    HBsAb Nonreact      [11-03-22 @ 06:40]  HBsAg Nonreact      [11-03-22 @ 06:40]  HBcAb Nonreact      [11-03-22 @ 06:40]  HCV 0.14, Nonreact      [10-27-22 @ 04:09]

## 2022-11-25 LAB
ALBUMIN SERPL ELPH-MCNC: 3.3 G/DL — LOW (ref 3.5–5.2)
ALBUMIN SERPL ELPH-MCNC: 3.4 G/DL — LOW (ref 3.5–5.2)
ALP SERPL-CCNC: 158 U/L — HIGH (ref 30–115)
ALP SERPL-CCNC: 197 U/L — HIGH (ref 30–115)
ALT FLD-CCNC: <5 U/L — SIGNIFICANT CHANGE UP (ref 0–41)
ALT FLD-CCNC: <5 U/L — SIGNIFICANT CHANGE UP (ref 0–41)
ANION GAP SERPL CALC-SCNC: 13 MMOL/L — SIGNIFICANT CHANGE UP (ref 7–14)
APTT BLD: 32.4 SEC — SIGNIFICANT CHANGE UP (ref 27–39.2)
AST SERPL-CCNC: 5 U/L — SIGNIFICANT CHANGE UP (ref 0–41)
AST SERPL-CCNC: <5 U/L — SIGNIFICANT CHANGE UP (ref 0–41)
BASOPHILS # BLD AUTO: 0.07 K/UL — SIGNIFICANT CHANGE UP (ref 0–0.2)
BASOPHILS NFR BLD AUTO: 1 % — SIGNIFICANT CHANGE UP (ref 0–1)
BILIRUB SERPL-MCNC: 0.3 MG/DL — SIGNIFICANT CHANGE UP (ref 0.2–1.2)
BILIRUB SERPL-MCNC: 0.4 MG/DL — SIGNIFICANT CHANGE UP (ref 0.2–1.2)
BLD GP AB SCN SERPL QL: SIGNIFICANT CHANGE UP
BUN SERPL-MCNC: 49 MG/DL — HIGH (ref 10–20)
BUN SERPL-MCNC: 52 MG/DL — HIGH (ref 10–20)
BUN SERPL-MCNC: 53 MG/DL — HIGH (ref 10–20)
CALCIUM SERPL-MCNC: 8 MG/DL — LOW (ref 8.4–10.5)
CALCIUM SERPL-MCNC: 8.2 MG/DL — LOW (ref 8.4–10.5)
CALCIUM SERPL-MCNC: 8.3 MG/DL — LOW (ref 8.4–10.5)
CHLORIDE SERPL-SCNC: 95 MMOL/L — LOW (ref 98–110)
CHLORIDE SERPL-SCNC: 98 MMOL/L — SIGNIFICANT CHANGE UP (ref 98–110)
CO2 SERPL-SCNC: 24 MMOL/L — SIGNIFICANT CHANGE UP (ref 17–32)
CO2 SERPL-SCNC: 27 MMOL/L — SIGNIFICANT CHANGE UP (ref 17–32)
CO2 SERPL-SCNC: 28 MMOL/L — SIGNIFICANT CHANGE UP (ref 17–32)
CREAT SERPL-MCNC: 5.5 MG/DL — CRITICAL HIGH (ref 0.7–1.5)
CREAT SERPL-MCNC: 5.9 MG/DL — CRITICAL HIGH (ref 0.7–1.5)
CREAT SERPL-MCNC: 6.1 MG/DL — CRITICAL HIGH (ref 0.7–1.5)
EGFR: 10 ML/MIN/1.73M2 — LOW
EGFR: 10 ML/MIN/1.73M2 — LOW
EGFR: 11 ML/MIN/1.73M2 — LOW
EOSINOPHIL # BLD AUTO: 0.19 K/UL — SIGNIFICANT CHANGE UP (ref 0–0.7)
EOSINOPHIL NFR BLD AUTO: 2.6 % — SIGNIFICANT CHANGE UP (ref 0–8)
GLUCOSE BLDC GLUCOMTR-MCNC: 109 MG/DL — HIGH (ref 70–99)
GLUCOSE BLDC GLUCOMTR-MCNC: 113 MG/DL — HIGH (ref 70–99)
GLUCOSE BLDC GLUCOMTR-MCNC: 121 MG/DL — HIGH (ref 70–99)
GLUCOSE BLDC GLUCOMTR-MCNC: 296 MG/DL — HIGH (ref 70–99)
GLUCOSE SERPL-MCNC: 109 MG/DL — HIGH (ref 70–99)
GLUCOSE SERPL-MCNC: 119 MG/DL — HIGH (ref 70–99)
GLUCOSE SERPL-MCNC: 150 MG/DL — HIGH (ref 70–99)
HCT VFR BLD CALC: 23.7 % — LOW (ref 42–52)
HCT VFR BLD CALC: 24.9 % — LOW (ref 42–52)
HGB BLD-MCNC: 7.8 G/DL — LOW (ref 14–18)
HGB BLD-MCNC: 8.1 G/DL — LOW (ref 14–18)
IMM GRANULOCYTES NFR BLD AUTO: 0.6 % — HIGH (ref 0.1–0.3)
INR BLD: 1.2 RATIO — SIGNIFICANT CHANGE UP (ref 0.65–1.3)
LYMPHOCYTES # BLD AUTO: 0.77 K/UL — LOW (ref 1.2–3.4)
LYMPHOCYTES # BLD AUTO: 10.7 % — LOW (ref 20.5–51.1)
MAGNESIUM SERPL-MCNC: 2 MG/DL — SIGNIFICANT CHANGE UP (ref 1.8–2.4)
MAGNESIUM SERPL-MCNC: 2.1 MG/DL — SIGNIFICANT CHANGE UP (ref 1.8–2.4)
MCHC RBC-ENTMCNC: 30 PG — SIGNIFICANT CHANGE UP (ref 27–31)
MCHC RBC-ENTMCNC: 30.5 PG — SIGNIFICANT CHANGE UP (ref 27–31)
MCHC RBC-ENTMCNC: 32.5 G/DL — SIGNIFICANT CHANGE UP (ref 32–37)
MCHC RBC-ENTMCNC: 32.9 G/DL — SIGNIFICANT CHANGE UP (ref 32–37)
MCV RBC AUTO: 91.2 FL — SIGNIFICANT CHANGE UP (ref 80–94)
MCV RBC AUTO: 93.6 FL — SIGNIFICANT CHANGE UP (ref 80–94)
MONOCYTES # BLD AUTO: 0.58 K/UL — SIGNIFICANT CHANGE UP (ref 0.1–0.6)
MONOCYTES NFR BLD AUTO: 8 % — SIGNIFICANT CHANGE UP (ref 1.7–9.3)
NEUTROPHILS # BLD AUTO: 5.56 K/UL — SIGNIFICANT CHANGE UP (ref 1.4–6.5)
NEUTROPHILS NFR BLD AUTO: 77.1 % — HIGH (ref 42.2–75.2)
NRBC # BLD: 0 /100 WBCS — SIGNIFICANT CHANGE UP (ref 0–0)
NRBC # BLD: 0 /100 WBCS — SIGNIFICANT CHANGE UP (ref 0–0)
PLATELET # BLD AUTO: 223 K/UL — SIGNIFICANT CHANGE UP (ref 130–400)
PLATELET # BLD AUTO: 236 K/UL — SIGNIFICANT CHANGE UP (ref 130–400)
POTASSIUM SERPL-MCNC: 5.6 MMOL/L — HIGH (ref 3.5–5)
POTASSIUM SERPL-MCNC: 5.7 MMOL/L — HIGH (ref 3.5–5)
POTASSIUM SERPL-SCNC: 5.6 MMOL/L — HIGH (ref 3.5–5)
POTASSIUM SERPL-SCNC: 5.7 MMOL/L — HIGH (ref 3.5–5)
PROT SERPL-MCNC: 7.1 G/DL — SIGNIFICANT CHANGE UP (ref 6–8)
PROT SERPL-MCNC: 7.1 G/DL — SIGNIFICANT CHANGE UP (ref 6–8)
PROTHROM AB SERPL-ACNC: 13.7 SEC — HIGH (ref 9.95–12.87)
RBC # BLD: 2.6 M/UL — LOW (ref 4.7–6.1)
RBC # BLD: 2.66 M/UL — LOW (ref 4.7–6.1)
RBC # FLD: 15.7 % — HIGH (ref 11.5–14.5)
RBC # FLD: 15.8 % — HIGH (ref 11.5–14.5)
SARS-COV-2 RNA SPEC QL NAA+PROBE: DETECTED
SARS-COV-2 RNA SPEC QL NAA+PROBE: SIGNIFICANT CHANGE UP
SODIUM SERPL-SCNC: 132 MMOL/L — LOW (ref 135–146)
SODIUM SERPL-SCNC: 139 MMOL/L — SIGNIFICANT CHANGE UP (ref 135–146)
WBC # BLD: 7.21 K/UL — SIGNIFICANT CHANGE UP (ref 4.8–10.8)
WBC # BLD: 8.56 K/UL — SIGNIFICANT CHANGE UP (ref 4.8–10.8)
WBC # FLD AUTO: 7.21 K/UL — SIGNIFICANT CHANGE UP (ref 4.8–10.8)
WBC # FLD AUTO: 8.56 K/UL — SIGNIFICANT CHANGE UP (ref 4.8–10.8)

## 2022-11-25 PROCEDURE — 99233 SBSQ HOSP IP/OBS HIGH 50: CPT

## 2022-11-25 RX ADMIN — Medication 81 MILLIGRAM(S): at 11:22

## 2022-11-25 RX ADMIN — Medication 12.5 MILLIGRAM(S): at 17:21

## 2022-11-25 RX ADMIN — HEPARIN SODIUM 5000 UNIT(S): 5000 INJECTION INTRAVENOUS; SUBCUTANEOUS at 21:08

## 2022-11-25 RX ADMIN — FAMOTIDINE 20 MILLIGRAM(S): 10 INJECTION INTRAVENOUS at 11:21

## 2022-11-25 RX ADMIN — TAMSULOSIN HYDROCHLORIDE 0.4 MILLIGRAM(S): 0.4 CAPSULE ORAL at 21:08

## 2022-11-25 RX ADMIN — Medication 12.5 MILLIGRAM(S): at 05:21

## 2022-11-25 RX ADMIN — ATORVASTATIN CALCIUM 40 MILLIGRAM(S): 80 TABLET, FILM COATED ORAL at 21:08

## 2022-11-25 RX ADMIN — HEPARIN SODIUM 5000 UNIT(S): 5000 INJECTION INTRAVENOUS; SUBCUTANEOUS at 15:35

## 2022-11-25 RX ADMIN — CLOPIDOGREL BISULFATE 75 MILLIGRAM(S): 75 TABLET, FILM COATED ORAL at 11:22

## 2022-11-25 RX ADMIN — PREGABALIN 1000 MICROGRAM(S): 225 CAPSULE ORAL at 11:23

## 2022-11-25 RX ADMIN — SENNA PLUS 2 TABLET(S): 8.6 TABLET ORAL at 21:08

## 2022-11-25 RX ADMIN — Medication 30 MILLIGRAM(S): at 05:21

## 2022-11-25 RX ADMIN — HEPARIN SODIUM 5000 UNIT(S): 5000 INJECTION INTRAVENOUS; SUBCUTANEOUS at 05:21

## 2022-11-25 RX ADMIN — Medication 1 MILLIGRAM(S): at 11:22

## 2022-11-25 RX ADMIN — Medication 40 MILLIGRAM(S): at 05:20

## 2022-11-25 RX ADMIN — SEVELAMER CARBONATE 800 MILLIGRAM(S): 2400 POWDER, FOR SUSPENSION ORAL at 17:20

## 2022-11-25 RX ADMIN — CEFEPIME 100 MILLIGRAM(S): 1 INJECTION, POWDER, FOR SOLUTION INTRAMUSCULAR; INTRAVENOUS at 18:12

## 2022-11-25 RX ADMIN — INSULIN GLARGINE 13 UNIT(S): 100 INJECTION, SOLUTION SUBCUTANEOUS at 21:07

## 2022-11-25 NOTE — PROGRESS NOTE ADULT - SUBJECTIVE AND OBJECTIVE BOX
SUBJECTIVE:    Patient is a 62y old Male who presents with a chief complaint of Chest pain (25 Nov 2022 11:26)    Currently admitted to medicine with the primary diagnosis of Anemia       Today is hospital day 11d.     PAST MEDICAL & SURGICAL HISTORY  CAD (coronary artery disease)  1 stent 2008    S/P CABG (coronary artery bypass graft)  x4    Diabetes mellitus    Transient ischemic attack (TIA)  2017; 2008    Chronic kidney disease (CKD)  Stage IV    Hypertension    Stented coronary artery  in 2008    Myocardial infarction  2012    PAD (peripheral artery disease)  S/p bypass left leg    HLD (hyperlipidemia)    BPH (benign prostatic hyperplasia)    Pain in left knee  s/p fall    OA (osteoarthritis)    [place] (hard of hearing)    Chronic anemia    S/P CABG (coronary artery bypass graft)  2012    H/O arterial bypass of lower limb  Left Lower Extremity (2016)    History of surgery  Left CEA (2017)  Left Pinkie toe Amputation (2014)  CABG x 4 (2012)  Card cath - stent (2008)      AV fistula  2019  LEFT AV FISTULA      ALLERGIES:  No Known Allergies    MEDICATIONS:  STANDING MEDICATIONS  aspirin enteric coated 81 milliGRAM(s) Oral daily  atorvastatin 40 milliGRAM(s) Oral at bedtime  cefepime   IVPB 2000 milliGRAM(s) IV Intermittent <User Schedule>  clopidogrel Tablet 75 milliGRAM(s) Oral daily  cyanocobalamin 1000 MICROGram(s) Oral daily  darbepoetin Injectable Syringe 60 MICROGram(s) IV Push <User Schedule>  dextrose 5%. 1000 milliLiter(s) IV Continuous <Continuous>  dextrose 5%. 1000 milliLiter(s) IV Continuous <Continuous>  dextrose 50% Injectable 25 Gram(s) IV Push once  dextrose 50% Injectable 12.5 Gram(s) IV Push once  dextrose 50% Injectable 25 Gram(s) IV Push once  famotidine    Tablet 20 milliGRAM(s) Oral daily  folic acid 1 milliGRAM(s) Oral daily  furosemide    Tablet 40 milliGRAM(s) Oral daily  glucagon  Injectable 1 milliGRAM(s) IntraMuscular once  heparin   Injectable 5000 Unit(s) SubCutaneous every 8 hours  influenza   Vaccine 0.5 milliLiter(s) IntraMuscular once  insulin glargine Injectable (LANTUS) 13 Unit(s) SubCutaneous at bedtime  insulin lispro (ADMELOG) corrective regimen sliding scale   SubCutaneous three times a day before meals  insulin lispro Injectable (ADMELOG) 5 Unit(s) SubCutaneous before breakfast  insulin lispro Injectable (ADMELOG) 5 Unit(s) SubCutaneous before lunch  metoprolol tartrate 12.5 milliGRAM(s) Oral every 12 hours  NIFEdipine XL 30 milliGRAM(s) Oral daily  regadenoson Injectable 0.4 milliGRAM(s) IV Push once  senna 2 Tablet(s) Oral at bedtime  sevelamer carbonate 800 milliGRAM(s) Oral three times a day with meals  tamsulosin 0.4 milliGRAM(s) Oral at bedtime    PRN MEDICATIONS  acetaminophen     Tablet .. 650 milliGRAM(s) Oral every 6 hours PRN  dextrose Oral Gel 15 Gram(s) Oral once PRN  melatonin 3 milliGRAM(s) Oral at bedtime PRN    VITALS:   T(F): 96.6  HR: 88  BP: 145/79  RR: 19  SpO2: 100%    LABS:                        7.8    8.56  )-----------( 223      ( 25 Nov 2022 13:33 )             23.7     11-25    132<L>  |  95<L>  |  53<H>  ----------------------------<  109<H>  5.7<H>   |  24  |  x     Ca    8.2<L>      25 Nov 2022 13:33  Mg     2.0     11-25    TPro  7.1  /  Alb  3.4<L>  /  TBili  0.4  /  DBili  x   /  AST  x   /  ALT  x   /  AlkPhos  158<H>  11-25    PT/INR - ( 25 Nov 2022 06:07 )   PT: 13.70 sec;   INR: 1.20 ratio         PTT - ( 25 Nov 2022 06:07 )  PTT:32.4 sec              RADIOLOGY:    PHYSICAL EXAM:  GEN: No acute distress  LUNGS: Clear to auscultation bilaterally   HEART: S1/S2 present. RRR.   ABD/ GI: Soft, non-tender, non-distended. Bowel sounds present  EXT: rt foot heel ulcer  NEURO: AAOX3

## 2022-11-25 NOTE — CHART NOTE - NSCHARTNOTEFT_GEN_A_CORE
Registered Dietitian Follow-Up     Patient Profile Reviewed                           Yes [x]   No []     Nutrition History Previously Obtained        Yes [x]  No []       Pertinent Subjective Information:  Patient reports fair appetite and PO intake. He was able to consume Squash soup , pumpkin cheesecake yesterday. Reports last BM yesterday     Pertinent Medical Interventions:  #ACS/CAD presenting with chest pain  #CAD s/p PCl/CABG  #PAD s/p angioplasty   #HTN/HLD  #Chronic HFmrEF  #Anemia of chronic disease  #ESRD on HD  -b/l pitting edema  #Recent hx calcaneal OM  -Debridement  PM cancelled  -Rescheduled debridement for    -Pt to go for surgery today after dialysis 2/2 elevated K+  #Type 2DM     Diet order:   Diet, Renal Restrictions:   For patients receiving Renal Replacement - No Protein Restr, No Conc K, No Conc Phos, Low Sodium  Consistent Carbohydrate {No Snacks}  1200mL Fluid Restriction ([id number]) (22 @ 08:09) [Active]    Anthropometrics:  Height (cm): 180.3 (22 @ 13:53)  Weight (kg): 90.8 (22 @ 13:53)  BMI (kg/m2): 27.9 (22 @ 13:53)  IBW: 78.1 kg     Daily Weight in k.7 ()  Mild fluctuations in weight likely related to fluid shifts 2/2 HD    MEDICATIONS  (STANDING):  aspirin enteric coated 81 milliGRAM(s) Oral daily  atorvastatin 40 milliGRAM(s) Oral at bedtime  cefepime   IVPB 2000 milliGRAM(s) IV Intermittent <User Schedule>  clopidogrel Tablet 75 milliGRAM(s) Oral daily  cyanocobalamin 1000 MICROGram(s) Oral daily  darbepoetin Injectable Syringe 60 MICROGram(s) IV Push <User Schedule>  dextrose 5%. 1000 milliLiter(s) (100 mL/Hr) IV Continuous <Continuous>  dextrose 5%. 1000 milliLiter(s) (50 mL/Hr) IV Continuous <Continuous>  dextrose 50% Injectable 25 Gram(s) IV Push once  dextrose 50% Injectable 12.5 Gram(s) IV Push once  dextrose 50% Injectable 25 Gram(s) IV Push once  famotidine    Tablet 20 milliGRAM(s) Oral daily  folic acid 1 milliGRAM(s) Oral daily  furosemide    Tablet 40 milliGRAM(s) Oral daily  glucagon  Injectable 1 milliGRAM(s) IntraMuscular once  heparin   Injectable 5000 Unit(s) SubCutaneous every 8 hours  influenza   Vaccine 0.5 milliLiter(s) IntraMuscular once  insulin glargine Injectable (LANTUS) 13 Unit(s) SubCutaneous at bedtime  insulin lispro (ADMELOG) corrective regimen sliding scale   SubCutaneous three times a day before meals  insulin lispro Injectable (ADMELOG) 5 Unit(s) SubCutaneous before breakfast  insulin lispro Injectable (ADMELOG) 5 Unit(s) SubCutaneous before lunch  metoprolol tartrate 12.5 milliGRAM(s) Oral every 12 hours  NIFEdipine XL 30 milliGRAM(s) Oral daily  regadenoson Injectable 0.4 milliGRAM(s) IV Push once  senna 2 Tablet(s) Oral at bedtime  sevelamer carbonate 800 milliGRAM(s) Oral three times a day with meals  tamsulosin 0.4 milliGRAM(s) Oral at bedtime    MEDICATIONS  (PRN):  acetaminophen     Tablet .. 650 milliGRAM(s) Oral every 6 hours PRN Mild Pain (1 - 3)  dextrose Oral Gel 15 Gram(s) Oral once PRN Blood Glucose LESS THAN 70 milliGRAM(s)/deciliter  melatonin 3 milliGRAM(s) Oral at bedtime PRN Insomnia    Pertinent Labs:  @ 06:07: Na 139, BUN 52<H>, Cr 5.9<HH>, <H>, K+ 5.6<H>, Phos --, Mg 2.1, Alk Phos 197<H>, ALT/SGPT <5, AST/SGOT 5, HbA1c --   @ 21:55: Na 133<L>, BUN 49<H>, Cr 5.5<HH>, <H>, K+ 5.1<H>, Phos --, Mg --, Alk Phos --, ALT/SGPT --, AST/SGOT --, HbA1c --    Finger Sticks:  POCT Blood Glucose.: 109 mg/dL ( @ 11:18)  POCT Blood Glucose.: 113 mg/dL ( @ 07:50)  POCT Blood Glucose.: 180 mg/dL ( @ 20:25)  POCT Blood Glucose.: 178 mg/dL ( @ 16:32)    Physical Findings:  - Appearance: alert, oriented   - GI function: last BM  per patient   - Tubes: n/a   - Oral/Mouth cavity: regular texture/consistency   - Skin: no edema documented - previously noted b/l pitting edema; left first toe - diabetic ulcer     Nutrition Requirements:  Weight Used: 90.8 kg ABW     Estimated Energy Needs    Continue [x]  Adjust []  1724 - 2241 kcal/day (1.0 - 1.3 AF -- due to obese BMI vs. HD)    Estimated Protein Needs    Continue [x]  Adjust []  109 - 118 gm/day (1.2 - 1.3 gm/kg) - same reason as above)     Estimated Fluid Needs        Continue [x]  Adjust []  2274 mL/day (25 mL/kcal) - with consideration for HD     Nutrient Intake: Per EMR documentation: % of meals      [x] Previous Nutrition Diagnosis:  Increase Nutrient Needs            [x] Ongoing          [] Resolved     Nutrition Intervention:  meals and snacks, coordination of care     Goal/Expected Outcome:   Patient to have PO intake >75% of meals within 4-6 days      Indicator/Monitoring:   Energy intake, weight, labs (renal, BG, electrolyte profile), skin status, NFPF     Recommendation:  1) Continue current diet order  2) Continue to encourage and assist with meals, snacks  3) Monitor weights    Patient is at moderate nutrition risk, RD to f/u in 4-6 days, or PRN    RD to remain available: Janel Enrique, ERNESTO [phone]

## 2022-11-25 NOTE — PROGRESS NOTE ADULT - ASSESSMENT
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 and cefepime post dialysis s.p wound Vac, chronic anemia, HFmrEF, presented to the hospital for chest pain. Found to have anemia s/p 1 unit of prbc.    ESRD on HD/ Anemia / Chest pain/ OM on abx  HD today for hyperKemia - going for I&D later  3h opti 160 UF 3l as tolerated    RAMSEY weekly post HD 60 mcg/ transfuse if Hb< 7  on sevelamer 800 mg po tid/ ph at goal / repeat please   on lasix non oliguric    BP controlled  abx for OM - Cefepime  s/p debridement with podiatry 11/21/cardiology f/up noted / OR today    will follow

## 2022-11-25 NOTE — PROGRESS NOTE ADULT - ASSESSMENT
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 and cefepime post dialysis s.p wound Vac, chronic anemia, HFmrEF, presented to the hospital for chest pain     1.Recent hx Left calcaneal OM   - Wound Cx 9/16 - Enterobacter cloacae  - wound CX 10/16 VRE Faecium Enterobacter cloacae complex, Proteus vulgaris, Morganella morganii   - s/p debridement 10/21 Wound Cx Proteus mirabilis, VRE faecium, Pseudomonas putida  - s/p debridement 10/24 [place]rum[place] L calcaneous   - s/p debridement 11/3 with wound vac in place  - Cefepime 2g post HD through 11/30; s/p 3 session of daptomycin post-HD  - On last admission, ID plan for 4 weeks from last debridement (11/3-11/30)  -Debridement  for Friday 11/25/2022 - again cancelled by podiatry today- patient has fowl smelling wound. on cefepime.     2. NSVT  -10 beats of NSVT observed on tele on 11/16 PM  -Started Metoprolol 12.5 BID  -Maintain K>4 and Mg>2    3. Anemia of chronic disease s/p 1 PRBC   - Monitor CBC, transfuse to keep >8    4.  ESRD on HD   - HD per nephrology  - B/L pitting edema, little urine output    5. Chest pain. hx of CAD s/p PCI/CABG and PAD s/p angioplasty  ACS/CAD presenting with Chest pain  -  on aspirin and plavix   - statin 40mg HS   - Troponin stable between 0.7-0.8  NST11/18/22: FIXED PERFUSION DEFECT INVOLVING ANTERIOR WALL OF THE LEFT VENTRICLE EXTENDING TO APEX CONSISTENT WITH SCAR. GLOBAL HYPOKINESIS WITH POOR THICKENING OF THE ANTERIOR WALL. EF  36 %   < from: TTE Echo Complete w/ Contrast w/ Doppler (11.18.22 @ 08:35) >  Mildly decreased global left ventricular systolic function with mild   concentric hypertrophy and normal internal cavity size.   2. With echocontrast, a moderate sized apical aneurysm is noted. There   is no evidence of LV thrombus.   3. Left ventricular ejection fraction, by visual estimation, is 45 to   50%.   4. Grade III diastolic dysfunction with elevated left atrial and left   ventricular end-diastolic pressures.    6.  PAD  - s/p angiogram LLE with peroneal artery angioplasty and arterectomy 10/27  - Cont meds as above    7. Type 2 DM  - Adjusted insulin due to hypoglycemia in am  - Lantus was decreased from 20u to 13unit     8.  BPH  - c/w Tamsulosin 0.4 mg daily    Pending: Podiatry procedure  -DVT prophylaxis: Heparin  -GI prophylaxis: Not indicated  -Diet: Renal, 1.2l fluid restriction  -Code status: Full code    foot debridement cancelled for today and will reschedule for tuesday now 11/29

## 2022-11-25 NOTE — PROGRESS NOTE ADULT - ASSESSMENT
63 yo male, h/o type 2 DM on insulin, CAD s.p stent 2008, s/p CABG 2012 (Dr Smith), PAD s/p angioplasty and stent b/l LE, BPH, ESRD on HD through left AVF (MWF follows with Dr MARY Paula), DFU sp left toe amputation, left calcaneal OM on daptomycin and cefepime post dialysis s.p wound Vac, chronic anemia, HFmrEF, presented to the hospital for chest pain. Found to have anemia s/p 1 unit of prbc.    ESRD on HD/ Anemia / Chest pain/ OM on abx  HD today 3h opti 160 UF 3l as tolerated    RAMSEY weekly post HD 60 mcg/ transfuse if Hb< 7  on sevelamer 800 mg po tid/ ph at goal / repeat please   on lasix non oliguric    BP controlled  abx for OM - Cefepime  s/p debridement with podiatry 11/21/cardiology f/up noted / OR today?    will follow

## 2022-11-25 NOTE — PROGRESS NOTE ADULT - SUBJECTIVE AND OBJECTIVE BOX
LAWRENCE SCHWABACHER 62y Male  MRN#: [id number]   CODE STATUS:________    Hospital Day: 11d    Pt is currently admitted with the primary diagnosis of chest pain     SUBJECTIVE  Hospital course:    63 yo male, h/o type 2 DM on insulin, CAD s.p stent 2008, s/p CABG 2012 (Dr Smith), PAD s/p angioplasty and stent b/l LE, BPH, ESRD on HD through left AVF (MWF follows with Dr MARY Paula), DFU sp left toe amputation, left calcaneal OM on daptomycin and cefepime post dialysis s.p wound Vac, chronic anemia, HFmrEF, presented to the hospital for chest pain   Pain is mid sternal, pressure like, discomfort rather than pain, occurring at rest, non radiating, exacerbated by cough, not similar to previous episodes of chest pain, not associated with tremor , diaphoresis, numbness, lightheadedness; Pain has improved with tylenol initially and then resolved with nitroglycerin SL   Patient denies fever, chills, shortness of breath, sputum, abdominal pain; ROS otherwise negative    Troponin 0.70 in setting of ESRD, 0.25 back in 09/2022  - Troponin stable between 0.7-0.8  - TTE LVEF 45-50%, Grade 3 diastolic dysfunction  NST:  NO EVIDENCE FOR ISCHEMIA DURING LEXISCAN INFUSION. FIXED PERFUSION   DEFECT INVOLVING ANTERIOR WALL OF THE LEFT VENTRICLE EXTENDING TO APEX   CONSISTENT WITH SCAR. GLOBAL HYPOKINESIS WITH POOR THICKENING OF THE ANTERIOR WALL. LEFT VENTRICULAR EJECTION FRACTION OF  36 % WHICH IS LOW.  - c/w ASA 81 mg daily- c/w Plavix 75 mg daily- c/w atorvastatin 40 mg daily- c/w Nifedipine 30 mg daily  EKG: Left anterior fascicular block ST & T wave abnormality, consider lateral ischemia. Prolonged QT  Pt to go for debridement today after dialysis.     Overnight events: none    Subjective complaints:     Present Today:   - [place]acey:  No [ x ], Yes [   ] : Indication:     - Type of IV Access:       .. CVC/Piccline:  No [ x ], Yes [   ] : Indication:       .. Midline: No [  x], Yes [   ] : Indication:                                             ----------------------------------------------------------  OBJECTIVE  PAST MEDICAL & SURGICAL HISTORY  CAD (coronary artery disease)  1 stent 2008    S/P CABG (coronary artery bypass graft)  x4    Diabetes mellitus    Transient ischemic attack (TIA)  2017; 2008    Chronic kidney disease (CKD)  Stage IV    Hypertension    Stented coronary artery  in 2008    Myocardial infarction  2012    PAD (peripheral artery disease)  S/p bypass left leg    HLD (hyperlipidemia)    BPH (benign prostatic hyperplasia)    Pain in left knee  s/p fall    OA (osteoarthritis)    [place] ([place])    Chronic anemia    S/P CABG (coronary artery bypass graft)  2012    H/O arterial bypass of lower limb  Left Lower Extremity (2016)    History of surgery  Left CEA (2017)  Left Pinkie toe Amputation (2014)  CABG x 4 (2012)  Card cath - stent (2008)      AV fistula  2019  LEFT AV FISTULA                                              -----------------------------------------------------------  ALLERGIES:  No Known Allergies                                            ------------------------------------------------------------    HOME MEDICATIONS  Home Medications:  aspirin 81 mg oral tablet: 1 tab(s) orally once a day (14 Sep 2022 17:42)  cefepime 2 g intravenous injection: 2 granules intravenous Monday, Wednesday, and Friday post dialysis (14 Nov 2022 08:01)  DAPTOmycin 500 mg intravenous injection: 900 milligram(s) intravenous Monday, Wednesday, and Friday post dialysis (14 Nov 2022 08:02)  furosemide 40 mg oral tablet: 1 tab(s) orally once a day (14 Nov 2022 07:59)  Levemir 100 units/mL subcutaneous solution: 40 unit(s) subcutaneous once a day (at bedtime) (14 Sep 2022 17:42)  NIFEdipine 30 mg oral tablet, extended release: 1 tab(s) orally once a day (07 Nov 2022 13:45)  nitroglycerin 0.4 mg sublingual spray: 1 tab(s) sublingual every 5 minutes, As Needed up to 3 tabs (14 Nov 2022 08:05)  Plavix 75 mg oral tablet: 1 tab(s) orally once a day (14 Sep 2022 17:42)  Senna 8.6 mg oral tablet: 2 tab(s) orally once a day (at bedtime) (14 Nov 2022 08:03)  sevelamer carbonate 800 mg oral tablet: 1 tab(s) orally 3 times a day (with meals) (20 Sep 2022 11:36)  tamsulosin 0.4 mg oral capsule: 1 cap(s) orally once a day (at bedtime) (07 Nov 2022 13:45)  Trulicity Pen 1.5 mg/0.5 mL subcutaneous solution: 1.5  subcutaneous once a week (14 Sep 2022 17:42)  Tylenol 325 mg oral tablet: 2 tab(s) orally every 6 hours, As Needed (14 Nov 2022 08:03)                           MEDICATIONS:  STANDING MEDICATIONS  aspirin enteric coated 81 milliGRAM(s) Oral daily  atorvastatin 40 milliGRAM(s) Oral at bedtime  cefepime   IVPB 2000 milliGRAM(s) IV Intermittent <User Schedule>  clopidogrel Tablet 75 milliGRAM(s) Oral daily  cyanocobalamin 1000 MICROGram(s) Oral daily  darbepoetin Injectable Syringe 60 MICROGram(s) IV Push <User Schedule>  dextrose 5%. 1000 milliLiter(s) IV Continuous <Continuous>  dextrose 5%. 1000 milliLiter(s) IV Continuous <Continuous>  dextrose 50% Injectable 25 Gram(s) IV Push once  dextrose 50% Injectable 12.5 Gram(s) IV Push once  dextrose 50% Injectable 25 Gram(s) IV Push once  famotidine    Tablet 20 milliGRAM(s) Oral daily  folic acid 1 milliGRAM(s) Oral daily  furosemide    Tablet 40 milliGRAM(s) Oral daily  glucagon  Injectable 1 milliGRAM(s) IntraMuscular once  heparin   Injectable 5000 Unit(s) SubCutaneous every 8 hours  influenza   Vaccine 0.5 milliLiter(s) IntraMuscular once  insulin glargine Injectable (LANTUS) 13 Unit(s) SubCutaneous at bedtime  insulin lispro (ADMELOG) corrective regimen sliding scale   SubCutaneous three times a day before meals  insulin lispro Injectable (ADMELOG) 5 Unit(s) SubCutaneous before breakfast  insulin lispro Injectable (ADMELOG) 5 Unit(s) SubCutaneous before lunch  metoprolol tartrate 12.5 milliGRAM(s) Oral every 12 hours  NIFEdipine XL 30 milliGRAM(s) Oral daily  regadenoson Injectable 0.4 milliGRAM(s) IV Push once  senna 2 Tablet(s) Oral at bedtime  sevelamer carbonate 800 milliGRAM(s) Oral three times a day with meals  tamsulosin 0.4 milliGRAM(s) Oral at bedtime    PRN MEDICATIONS  acetaminophen     Tablet .. 650 milliGRAM(s) Oral every 6 hours PRN  dextrose Oral Gel 15 Gram(s) Oral once PRN  melatonin 3 milliGRAM(s) Oral at bedtime PRN                                            ------------------------------------------------------------    11-24-22 @ 07:01  -  11-25-22 @ 07:00  --------------------------------------------------------  IN: 360 mL / OUT: 0 mL / NET: 360 mL                                                 --------------------------------------------------------------  LABS:                        8.1    7.21  )-----------( 236      ( 25 Nov 2022 06:07 )             24.9     11-25    139  |  98  |  52<H>  ----------------------------<  119<H>  5.6<H>   |  28  |  5.9<HH>    Ca    8.3<L>      25 Nov 2022 06:07  Mg     2.1     11-25    TPro  7.1  /  Alb  3.3<L>  /  TBili  0.3  /  DBili  x   /  AST  5   /  ALT  <5  /  AlkPhos  197<H>  11-25    PT/INR - ( 25 Nov 2022 06:07 )   PT: 13.70 sec;   INR: 1.20 ratio         PTT - ( 25 Nov 2022 06:07 )  PTT:32.4 sec                                                            -------------------------------------------------------------  RADIOLOGY:                                            --------------------------------------------------------------  VITAL SIGNS: Last 24 Hours  T(C): 35.9 (25 Nov 2022 08:00), Max: 36.6 (24 Nov 2022 16:00)  T(F): 96.6 (25 Nov 2022 08:00), Max: 97.9 (24 Nov 2022 16:00)  HR: 88 (25 Nov 2022 08:00) (86 - 93)  BP: 144/71 (25 Nov 2022 08:00) (144/71 - 165/76)  BP(mean): --  RR: 19 (25 Nov 2022 08:00) (18 - 19)  SpO2: 100% (24 Nov 2022 16:00) (100% - 100%)    PHYSICAL EXAM:  General: Awake, oriented and resting comfortably, no acute distress  Head: normocephalic, atraumatic  ENT:  moist mucous membranes  Cardiac: regular rate and rhythm, normal S1 and S2, no murmurs, rubs or gallops  Respiratory: clear to auscultation bilaterally, no wheezes, crackles or rhonchi, unlabored respirations  Abdomen: normoactive bowel sounds x4, non tender, nondistended  Ext:  No edema, bandages clean   Neuro: A&O x3, no focal neurological deficits                                                --------------------------------------------------------------    ASSESSMENT & PLAN    Past medical history and hospital course     63 yo male, h/o type 2 DM on insulin, CAD s.p stent 2008, s/p CABG 2012 (Dr Smith), PAD s/p angioplasty and stent b/l LE, BPH, ESRD on HD through left AVF (MWF follows with Dr MARY Paula), DFU sp left toe amputation, left calcaneal OM on daptomycin and cefepime post dialysis s.p wound Vac, chronic anemia, HFmrEF, presented to the hospital for chest pain     # ACS/CAD presenting with Chest pain  #CAD s/p PCI/CABG  #PAD s/p angioplasty  #HTN/HLD  #Chronic HFmrEF  Troponin 0.70 in setting of ESRD, 0.25 back in 09/2022  - Troponin stable between 0.7-0.8  - TTE LVEF 45-50%, Grade 3 diastolic dysfunction  NST: FIXED PERFUSION DEFECT INVOLVING ANTERIOR WALL OF THE LEFT VENTRICLE EXTENDING TO APEX CONSISTENT WITH SCAR. GLOBAL HYPOKINESIS WITH POOR THICKENING OF THE ANTERIOR WALL. EF  36 %   - c/w ASA 81 mg daily  - c/w Plavix 75 mg daily  - c/w atorvastatin 40 mg daily  - c/w Nifedipine 30 mg daily  EKG: Left anterior fascicular block ST & T wave abnormality, consider lateral ischemia. Prolonged QT      #NSVT  -11/16 PM 10 beats of NSVT observed on tele   -Started Metoprolol 12.5 BID  -Maintain K>4 and Mg>2    # Anemia of chronic disease  - hb on admission 6.6 baseline 7.6. s/p 1U PRBC  -11/21 pt got 1 unit pRBCs   - Monitor CBC, transfuse to keep >8  -maintain active T&S     # ESRD on HD   - HD per nephrology  - B/L pitting edema, little urine output    #Recent hx Left calcaneal OM   - Wound Cx 9/16 - Enterobacter cloacae  - wound CX 10/16 VRE Faecium Enterobacter cloacae complex, Proteus vulgaris, Morganella morganii   - s/p debridement 10/21 Wound Cx Proteus mirabilis, VRE faecium, Pseudomonas putida  - s/p debridement 10/24 Crumble L calcaneous   - s/p debridement 11/3 with wound vac in place  - Cefepime 2g post HD through 11/30; s/p 3 session of daptomycin post-HD  - On last admission, ID plan for 4 weeks from last debridement (11/3-11/30)  -Debridement 11/21 PM cancelled  -Rescheduled debridement for 11/25  -Pt to go for surgery today after dialysis 2/2 elevated K   -pt requested PT until surgery     # PAD  - s/p angiogram LLE with peroneal artery angioplasty and arterectomy 10/27  - Cont meds as above    # Type 2 DM  Continue insulin regimen    # BPH  - c/w Tamsulosin 0.4 mg daily    Pending: Podiatry procedure  -DVT prophylaxis: Heparin  -GI prophylaxis: Not indicated  -Diet: Renal, 1.2l fluid restriction  -Code status: Full code        # Handoff

## 2022-11-25 NOTE — PROGRESS NOTE ADULT - SUBJECTIVE AND OBJECTIVE BOX
Nephrology progress note    Patient is seen and examined, events over the last 24 h noted .    Allergies:  No Known Allergies    Hospital Medications:   MEDICATIONS  (STANDING):  aspirin enteric coated 81 milliGRAM(s) Oral daily  atorvastatin 40 milliGRAM(s) Oral at bedtime  cefepime   IVPB 2000 milliGRAM(s) IV Intermittent <User Schedule>  clopidogrel Tablet 75 milliGRAM(s) Oral daily  cyanocobalamin 1000 MICROGram(s) Oral daily  darbepoetin Injectable Syringe 60 MICROGram(s) IV Push <User Schedule>  dextrose 5%. 1000 milliLiter(s) (100 mL/Hr) IV Continuous <Continuous>  dextrose 5%. 1000 milliLiter(s) (50 mL/Hr) IV Continuous <Continuous>  dextrose 50% Injectable 25 Gram(s) IV Push once  dextrose 50% Injectable 12.5 Gram(s) IV Push once  dextrose 50% Injectable 25 Gram(s) IV Push once  famotidine    Tablet 20 milliGRAM(s) Oral daily  folic acid 1 milliGRAM(s) Oral daily  furosemide    Tablet 40 milliGRAM(s) Oral daily  glucagon  Injectable 1 milliGRAM(s) IntraMuscular once  heparin   Injectable 5000 Unit(s) SubCutaneous every 8 hours  influenza   Vaccine 0.5 milliLiter(s) IntraMuscular once  insulin glargine Injectable (LANTUS) 13 Unit(s) SubCutaneous at bedtime  insulin lispro (ADMELOG) corrective regimen sliding scale   SubCutaneous three times a day before meals  insulin lispro Injectable (ADMELOG) 5 Unit(s) SubCutaneous before breakfast  insulin lispro Injectable (ADMELOG) 5 Unit(s) SubCutaneous before lunch  metoprolol tartrate 12.5 milliGRAM(s) Oral every 12 hours  NIFEdipine XL 30 milliGRAM(s) Oral daily  regadenoson Injectable 0.4 milliGRAM(s) IV Push once  senna 2 Tablet(s) Oral at bedtime  sevelamer carbonate 800 milliGRAM(s) Oral three times a day with meals  tamsulosin 0.4 milliGRAM(s) Oral at bedtime        VITALS:  T(F): 96.6 (11-25-22 @ 08:00), Max: 97.9 (11-24-22 @ 16:00)  HR: 88 (11-25-22 @ 08:00)  BP: 144/71 (11-25-22 @ 08:00)  RR: 19 (11-25-22 @ 08:00)  SpO2: 100% (11-24-22 @ 16:00)  Wt(kg): --    11-23 @ 07:01  -  11-24 @ 07:00  --------------------------------------------------------  IN: 1290 mL / OUT: 3000 mL / NET: -1710 mL    11-24 @ 07:01  -  11-25 @ 07:00  --------------------------------------------------------  IN: 360 mL / OUT: 0 mL / NET: 360 mL          PHYSICAL EXAM:  Constitutional: NAD  HEENT: anicteric scleraNeck: No JVD  Respiratory: deferred  Cardiovascular: deferred  Gastrointestinal: BS+, soft, NT/ND  Extremities: No peripheral edema  Neurological: A/O x 3  : No CVA tenderness. No schneider.   Skin: No rashes  Vascular Access: AVF    LABS:  11-25    139  |  98  |  52<H>  ----------------------------<  119<H>  5.6<H>   |  28  |  5.9<HH>    Ca    8.3<L>      25 Nov 2022 06:07  Mg     2.1     11-25    TPro  7.1  /  Alb  3.3<L>  /  TBili  0.3  /  DBili      /  AST  5   /  ALT  <5  /  AlkPhos  197<H>  11-25                          8.1    7.21  )-----------( 236      ( 25 Nov 2022 06:07 )             24.9       Urine Studies:      RADIOLOGY & ADDITIONAL STUDIES:

## 2022-11-25 NOTE — PROGRESS NOTE ADULT - SUBJECTIVE AND OBJECTIVE BOX
Nephrology progress note    THIS IS AN INCOMPLETE NOTE . FULL NOTE TO FOLLOW SHORTLY    Patient is seen and examined, events over the last 24 h noted .    Allergies:  No Known Allergies    Hospital Medications:   MEDICATIONS  (STANDING):  aspirin enteric coated 81 milliGRAM(s) Oral daily  atorvastatin 40 milliGRAM(s) Oral at bedtime  cefepime   IVPB 2000 milliGRAM(s) IV Intermittent <User Schedule>  clopidogrel Tablet 75 milliGRAM(s) Oral daily  cyanocobalamin 1000 MICROGram(s) Oral daily  darbepoetin Injectable Syringe 60 MICROGram(s) IV Push <User Schedule>  dextrose 5%. 1000 milliLiter(s) (50 mL/Hr) IV Continuous <Continuous>  dextrose 5%. 1000 milliLiter(s) (100 mL/Hr) IV Continuous <Continuous>  dextrose 50% Injectable 25 Gram(s) IV Push once  dextrose 50% Injectable 12.5 Gram(s) IV Push once  dextrose 50% Injectable 25 Gram(s) IV Push once  famotidine    Tablet 20 milliGRAM(s) Oral daily  folic acid 1 milliGRAM(s) Oral daily  furosemide    Tablet 40 milliGRAM(s) Oral daily  glucagon  Injectable 1 milliGRAM(s) IntraMuscular once  heparin   Injectable 5000 Unit(s) SubCutaneous every 8 hours  influenza   Vaccine 0.5 milliLiter(s) IntraMuscular once  insulin glargine Injectable (LANTUS) 13 Unit(s) SubCutaneous at bedtime  insulin lispro (ADMELOG) corrective regimen sliding scale   SubCutaneous three times a day before meals  insulin lispro Injectable (ADMELOG) 5 Unit(s) SubCutaneous before breakfast  insulin lispro Injectable (ADMELOG) 5 Unit(s) SubCutaneous before lunch  metoprolol tartrate 12.5 milliGRAM(s) Oral every 12 hours  NIFEdipine XL 30 milliGRAM(s) Oral daily  regadenoson Injectable 0.4 milliGRAM(s) IV Push once  senna 2 Tablet(s) Oral at bedtime  sevelamer carbonate 800 milliGRAM(s) Oral three times a day with meals  tamsulosin 0.4 milliGRAM(s) Oral at bedtime        VITALS:  T(F): 96.6 (11-25-22 @ 08:00), Max: 97.9 (11-24-22 @ 16:00)  HR: 88 (11-25-22 @ 08:00)  BP: 144/71 (11-25-22 @ 08:00)  RR: 19 (11-25-22 @ 08:00)  SpO2: 100% (11-24-22 @ 16:00)  Wt(kg): --    11-23 @ 07:01  -  11-24 @ 07:00  --------------------------------------------------------  IN: 1290 mL / OUT: 3000 mL / NET: -1710 mL    11-24 @ 07:01  -  11-25 @ 07:00  --------------------------------------------------------  IN: 360 mL / OUT: 0 mL / NET: 360 mL          PHYSICAL EXAM:  Constitutional: NAD  HEENT: anicteric sclera, oropharynx clear, MMM  Neck: No JVD  Respiratory: CTAB, no wheezes, rales or rhonchi  Cardiovascular: S1, S2, RRR  Gastrointestinal: BS+, soft, NT/ND  Extremities: No cyanosis or clubbing. No peripheral edema  :  No schneider.   Skin: No rashes    LABS:  11-25    139  |  98  |  52<H>  ----------------------------<  119<H>  5.6<H>   |  28  |  5.9<HH>    Ca    8.3<L>      25 Nov 2022 06:07  Mg     2.1     11-25    TPro  7.1  /  Alb  3.3<L>  /  TBili  0.3  /  DBili      /  AST  5   /  ALT  <5  /  AlkPhos  197<H>  11-25                          8.1    7.21  )-----------( 236      ( 25 Nov 2022 06:07 )             24.9       Urine Studies:        Iron 39, TIBC 133, %sat 29      [10-21-22 @ 10:30]  Ferritin 1126      [10-21-22 @ 10:30]  PTH -- (Ca 7.5)      [02-26-22 @ 16:00]   312  Vitamin D (25OH) 21      [02-26-22 @ 16:00]  HbA1c 5.8      [01-30-20 @ 06:46]  Lipid: chol 81, , HDL 22, LDL --      [10-15-22 @ 09:53]    HBsAb <3.0      [12-29-19 @ 17:44]  HBsAb Nonreact      [11-03-22 @ 06:40]  HBsAg Nonreact      [11-03-22 @ 06:40]  HBcAb Nonreact      [11-03-22 @ 06:40]  HCV 0.14, Nonreact      [10-27-22 @ 04:09]        RADIOLOGY & ADDITIONAL STUDIES:   Nephrology progress note  Patient is seen and examined, events over the last 24 h noted .  Lying in bed comfortable     Allergies:  No Known Allergies    Hospital Medications:     MEDICATIONS  (STANDING):    aspirin enteric coated 81 milliGRAM(s) Oral daily  atorvastatin 40 milliGRAM(s) Oral at bedtime  cefepime   IVPB 2000 milliGRAM(s) IV Intermittent <User Schedule>  clopidogrel Tablet 75 milliGRAM(s) Oral daily  cyanocobalamin 1000 MICROGram(s) Oral daily  darbepoetin Injectable Syringe 60 MICROGram(s) IV Push <User Schedule>  famotidine    Tablet 20 milliGRAM(s) Oral daily  folic acid 1 milliGRAM(s) Oral daily  furosemide    Tablet 40 milliGRAM(s) Oral daily  glucagon  Injectable 1 milliGRAM(s) IntraMuscular once  heparin   Injectable 5000 Unit(s) SubCutaneous every 8 hours  influenza   Vaccine 0.5 milliLiter(s) IntraMuscular once  insulin glargine Injectable (LANTUS) 13 Unit(s) SubCutaneous at bedtime  insulin lispro (ADMELOG) corrective regimen sliding scale   SubCutaneous three times a day before meals  insulin lispro Injectable (ADMELOG) 5 Unit(s) SubCutaneous before breakfast  insulin lispro Injectable (ADMELOG) 5 Unit(s) SubCutaneous before lunch  metoprolol tartrate 12.5 milliGRAM(s) Oral every 12 hours  NIFEdipine XL 30 milliGRAM(s) Oral daily  regadenoson Injectable 0.4 milliGRAM(s) IV Push once  senna 2 Tablet(s) Oral at bedtime  sevelamer carbonate 800 milliGRAM(s) Oral three times a day with meals  tamsulosin 0.4 milliGRAM(s) Oral at bedtime        VITALS:  T(F): 96.6 (11-25-22 @ 08:00), Max: 97.9 (11-24-22 @ 16:00)  HR: 88 (11-25-22 @ 08:00)  BP: 144/71 (11-25-22 @ 08:00)  RR: 19 (11-25-22 @ 08:00)  SpO2: 100% (11-24-22 @ 16:00)      11-23 @ 07:01  -  11-24 @ 07:00  --------------------------------------------------------  IN: 1290 mL / OUT: 3000 mL / NET: -1710 mL    11-24 @ 07:01  -  11-25 @ 07:00  --------------------------------------------------------  IN: 360 mL / OUT: 0 mL / NET: 360 mL          PHYSICAL EXAM:  Constitutional: NAD  Neck: No JVD  Respiratory: CTAB  Cardiovascular: S1, S2, RRR  Gastrointestinal: BS+, soft, NT/ND  Extremities: No cyanosis or clubbing. No peripheral edema/ foot in dressing   :  No schneider.   Skin: No rashes    LABS:  11-25    139  |  98  |  52<H>  ----------------------------<  119<H>  5.6<H>   |  28  |  5.9<HH>    Ca    8.3<L>      25 Nov 2022 06:07  Mg     2.1     11-25    TPro  7.1  /  Alb  3.3<L>  /  TBili  0.3  /  DBili      /  AST  5   /  ALT  <5  /  AlkPhos  197<H>  11-25                          8.1    7.21  )-----------( 236      ( 25 Nov 2022 06:07 )             24.9     Hemoglobin: 8.1 g/dL (11-25 @ 06:07)  Hemoglobin: 8.5 g/dL (11-24 @ 07:18)  Hemoglobin: 8.4 g/dL (11-23 @ 06:13)  Hemoglobin: 8.3 g/dL (11-22 @ 07:46)  Hemoglobin: 7.2 g/dL (11-21 @ 07:41)    Urine Studies:        Iron 39, TIBC 133, %sat 29      [10-21-22 @ 10:30]  Ferritin 1126      [10-21-22 @ 10:30]  PTH -- (Ca 7.5)      [02-26-22 @ 16:00]   312  Vitamin D (25OH) 21      [02-26-22 @ 16:00]  HbA1c 5.8      [01-30-20 @ 06:46]  Lipid: chol 81, , HDL 22, LDL --      [10-15-22 @ 09:53]    HBsAb <3.0      [12-29-19 @ 17:44]  HBsAb Nonreact      [11-03-22 @ 06:40]  HBsAg Nonreact      [11-03-22 @ 06:40]  HBcAb Nonreact      [11-03-22 @ 06:40]  HCV 0.14, Nonreact      [10-27-22 @ 04:09]        RADIOLOGY & ADDITIONAL STUDIES:

## 2022-11-26 LAB
GLUCOSE BLDC GLUCOMTR-MCNC: 111 MG/DL — HIGH (ref 70–99)
GLUCOSE BLDC GLUCOMTR-MCNC: 156 MG/DL — HIGH (ref 70–99)
GLUCOSE BLDC GLUCOMTR-MCNC: 161 MG/DL — HIGH (ref 70–99)
GLUCOSE BLDC GLUCOMTR-MCNC: 90 MG/DL — SIGNIFICANT CHANGE UP (ref 70–99)

## 2022-11-26 PROCEDURE — 99233 SBSQ HOSP IP/OBS HIGH 50: CPT

## 2022-11-26 PROCEDURE — 99232 SBSQ HOSP IP/OBS MODERATE 35: CPT | Mod: 24

## 2022-11-26 RX ORDER — GUAIFENESIN/DEXTROMETHORPHAN 600MG-30MG
5 TABLET, EXTENDED RELEASE 12 HR ORAL THREE TIMES A DAY
Refills: 0 | Status: DISCONTINUED | OUTPATIENT
Start: 2022-11-26 | End: 2022-12-08

## 2022-11-26 RX ADMIN — Medication 40 MILLIGRAM(S): at 05:49

## 2022-11-26 RX ADMIN — Medication 5 MILLILITER(S): at 05:49

## 2022-11-26 RX ADMIN — Medication 5 UNIT(S): at 12:10

## 2022-11-26 RX ADMIN — Medication 5 MILLILITER(S): at 21:44

## 2022-11-26 RX ADMIN — Medication 81 MILLIGRAM(S): at 12:12

## 2022-11-26 RX ADMIN — HEPARIN SODIUM 5000 UNIT(S): 5000 INJECTION INTRAVENOUS; SUBCUTANEOUS at 21:53

## 2022-11-26 RX ADMIN — HEPARIN SODIUM 5000 UNIT(S): 5000 INJECTION INTRAVENOUS; SUBCUTANEOUS at 13:25

## 2022-11-26 RX ADMIN — Medication 5 MILLILITER(S): at 13:25

## 2022-11-26 RX ADMIN — Medication 1 MILLIGRAM(S): at 12:11

## 2022-11-26 RX ADMIN — Medication 5 MILLILITER(S): at 01:05

## 2022-11-26 RX ADMIN — ATORVASTATIN CALCIUM 40 MILLIGRAM(S): 80 TABLET, FILM COATED ORAL at 21:52

## 2022-11-26 RX ADMIN — INSULIN GLARGINE 13 UNIT(S): 100 INJECTION, SOLUTION SUBCUTANEOUS at 22:11

## 2022-11-26 RX ADMIN — Medication 12.5 MILLIGRAM(S): at 17:04

## 2022-11-26 RX ADMIN — SEVELAMER CARBONATE 800 MILLIGRAM(S): 2400 POWDER, FOR SUSPENSION ORAL at 17:04

## 2022-11-26 RX ADMIN — FAMOTIDINE 20 MILLIGRAM(S): 10 INJECTION INTRAVENOUS at 12:11

## 2022-11-26 RX ADMIN — Medication 5 UNIT(S): at 08:18

## 2022-11-26 RX ADMIN — SEVELAMER CARBONATE 800 MILLIGRAM(S): 2400 POWDER, FOR SUSPENSION ORAL at 12:11

## 2022-11-26 RX ADMIN — CLOPIDOGREL BISULFATE 75 MILLIGRAM(S): 75 TABLET, FILM COATED ORAL at 12:11

## 2022-11-26 RX ADMIN — SEVELAMER CARBONATE 800 MILLIGRAM(S): 2400 POWDER, FOR SUSPENSION ORAL at 08:19

## 2022-11-26 RX ADMIN — PREGABALIN 1000 MICROGRAM(S): 225 CAPSULE ORAL at 12:11

## 2022-11-26 RX ADMIN — HEPARIN SODIUM 5000 UNIT(S): 5000 INJECTION INTRAVENOUS; SUBCUTANEOUS at 05:49

## 2022-11-26 RX ADMIN — SENNA PLUS 2 TABLET(S): 8.6 TABLET ORAL at 21:52

## 2022-11-26 RX ADMIN — Medication 1: at 12:10

## 2022-11-26 RX ADMIN — Medication 12.5 MILLIGRAM(S): at 05:50

## 2022-11-26 RX ADMIN — TAMSULOSIN HYDROCHLORIDE 0.4 MILLIGRAM(S): 0.4 CAPSULE ORAL at 21:53

## 2022-11-26 RX ADMIN — Medication 30 MILLIGRAM(S): at 05:50

## 2022-11-26 NOTE — PROGRESS NOTE ADULT - SUBJECTIVE AND OBJECTIVE BOX
SUBJECTIVE:    Patient is a 62y old Male who presents with a chief complaint of Chest pain (26 Nov 2022 13:44)    Currently admitted to medicine with the primary diagnosis of Anemia       Today is hospital day 12d.     PAST MEDICAL & SURGICAL HISTORY  CAD (coronary artery disease)  1 stent 2008    S/P CABG (coronary artery bypass graft)  x4    Diabetes mellitus    Transient ischemic attack (TIA)  2017; 2008    Chronic kidney disease (CKD)  Stage IV    Hypertension    Stented coronary artery  in 2008    Myocardial infarction  2012    PAD (peripheral artery disease)  S/p bypass left leg    HLD (hyperlipidemia)    BPH (benign prostatic hyperplasia)    Pain in left knee  s/p fall    OA (osteoarthritis)    [place]    Chronic anemia    S/P CABG (coronary artery bypass graft)  2012    H/O arterial bypass of lower limb  Left Lower Extremity (2016)    History of surgery  Left CEA (2017)  Left Pinkie toe Amputation (2014)  CABG x 4 (2012)  Card cath - stent (2008)      AV fistula  2019  LEFT AV FISTULA      ALLERGIES:  No Known Allergies    MEDICATIONS:  STANDING MEDICATIONS  aspirin enteric coated 81 milliGRAM(s) Oral daily  atorvastatin 40 milliGRAM(s) Oral at bedtime  cefepime   IVPB 2000 milliGRAM(s) IV Intermittent <User Schedule>  clopidogrel Tablet 75 milliGRAM(s) Oral daily  cyanocobalamin 1000 MICROGram(s) Oral daily  darbepoetin Injectable Syringe 60 MICROGram(s) IV Push <User Schedule>  dextrose 5%. 1000 milliLiter(s) IV Continuous <Continuous>  dextrose 5%. 1000 milliLiter(s) IV Continuous <Continuous>  dextrose 50% Injectable 25 Gram(s) IV Push once  dextrose 50% Injectable 12.5 Gram(s) IV Push once  dextrose 50% Injectable 25 Gram(s) IV Push once  famotidine    Tablet 20 milliGRAM(s) Oral daily  folic acid 1 milliGRAM(s) Oral daily  furosemide    Tablet 40 milliGRAM(s) Oral daily  glucagon  Injectable 1 milliGRAM(s) IntraMuscular once  guaifenesin/dextromethorphan Oral Liquid 5 milliLiter(s) Oral three times a day  heparin   Injectable 5000 Unit(s) SubCutaneous every 8 hours  influenza   Vaccine 0.5 milliLiter(s) IntraMuscular once  insulin glargine Injectable (LANTUS) 13 Unit(s) SubCutaneous at bedtime  insulin lispro (ADMELOG) corrective regimen sliding scale   SubCutaneous three times a day before meals  insulin lispro Injectable (ADMELOG) 5 Unit(s) SubCutaneous before breakfast  insulin lispro Injectable (ADMELOG) 5 Unit(s) SubCutaneous before lunch  metoprolol tartrate 12.5 milliGRAM(s) Oral every 12 hours  NIFEdipine XL 30 milliGRAM(s) Oral daily  regadenoson Injectable 0.4 milliGRAM(s) IV Push once  senna 2 Tablet(s) Oral at bedtime  sevelamer carbonate 800 milliGRAM(s) Oral three times a day with meals  tamsulosin 0.4 milliGRAM(s) Oral at bedtime    PRN MEDICATIONS  acetaminophen     Tablet .. 650 milliGRAM(s) Oral every 6 hours PRN  dextrose Oral Gel 15 Gram(s) Oral once PRN  melatonin 3 milliGRAM(s) Oral at bedtime PRN    VITALS:   T(F): 97.1  HR: 94  BP: 136/64  RR: 18  SpO2: --    LABS:                        7.8    8.56  )-----------( 223      ( 25 Nov 2022 13:33 )             23.7     11-25    132<L>  |  95<L>  |  53<H>  ----------------------------<  109<H>  5.7<H>   |  24  |  6.1<HH>    Ca    8.2<L>      25 Nov 2022 13:33  Mg     2.0     11-25    TPro  7.1  /  Alb  3.4<L>  /  TBili  0.4  /  DBili  x   /  AST  <5  /  ALT  <5  /  AlkPhos  158<H>  11-25    PT/INR - ( 25 Nov 2022 06:07 )   PT: 13.70 sec;   INR: 1.20 ratio         PTT - ( 25 Nov 2022 06:07 )  PTT:32.4 sec              RADIOLOGY:    PHYSICAL EXAM:  GEN: No acute distress  LUNGS: Clear to auscultation bilaterally   HEART: S1/S2 present. RRR.   ABD/ GI: Soft, non-tender, non-distended. Bowel sounds present  EXT: NC/NC/NE/2+PP/MARQUES  NEURO: AAOX3     SUBJECTIVE:    Patient is a 62y old Male who presents with a chief complaint of Chest pain (26 Nov 2022 13:44)    Currently admitted to medicine with the primary diagnosis of Anemia       Today is hospital day 12d.     PAST MEDICAL & SURGICAL HISTORY  CAD (coronary artery disease)  1 stent 2008    S/P CABG (coronary artery bypass graft)  x4    Diabetes mellitus    Transient ischemic attack (TIA)  2017; 2008    Chronic kidney disease (CKD)  Stage IV    Hypertension    Stented coronary artery  in 2008    Myocardial infarction  2012    PAD (peripheral artery disease)  S/p bypass left leg    HLD (hyperlipidemia)    BPH (benign prostatic hyperplasia)    Pain in left knee  s/p fall    OA (osteoarthritis)    [place] (hard of hearing)    Chronic anemia    S/P CABG (coronary artery bypass graft)  2012    H/O arterial bypass of lower limb  Left Lower Extremity (2016)    History of surgery  Left CEA (2017)  Left Pinkie toe Amputation (2014)  CABG x 4 (2012)  Card cath - stent (2008)      AV fistula  2019  LEFT AV FISTULA      ALLERGIES:  No Known Allergies    MEDICATIONS:  STANDING MEDICATIONS  aspirin enteric coated 81 milliGRAM(s) Oral daily  atorvastatin 40 milliGRAM(s) Oral at bedtime  cefepime   IVPB 2000 milliGRAM(s) IV Intermittent <User Schedule>  clopidogrel Tablet 75 milliGRAM(s) Oral daily  cyanocobalamin 1000 MICROGram(s) Oral daily  darbepoetin Injectable Syringe 60 MICROGram(s) IV Push <User Schedule>  dextrose 5%. 1000 milliLiter(s) IV Continuous <Continuous>  dextrose 5%. 1000 milliLiter(s) IV Continuous <Continuous>  dextrose 50% Injectable 25 Gram(s) IV Push once  dextrose 50% Injectable 12.5 Gram(s) IV Push once  dextrose 50% Injectable 25 Gram(s) IV Push once  famotidine    Tablet 20 milliGRAM(s) Oral daily  folic acid 1 milliGRAM(s) Oral daily  furosemide    Tablet 40 milliGRAM(s) Oral daily  glucagon  Injectable 1 milliGRAM(s) IntraMuscular once  guaifenesin/dextromethorphan Oral Liquid 5 milliLiter(s) Oral three times a day  heparin   Injectable 5000 Unit(s) SubCutaneous every 8 hours  influenza   Vaccine 0.5 milliLiter(s) IntraMuscular once  insulin glargine Injectable (LANTUS) 13 Unit(s) SubCutaneous at bedtime  insulin lispro (ADMELOG) corrective regimen sliding scale   SubCutaneous three times a day before meals  insulin lispro Injectable (ADMELOG) 5 Unit(s) SubCutaneous before breakfast  insulin lispro Injectable (ADMELOG) 5 Unit(s) SubCutaneous before lunch  metoprolol tartrate 12.5 milliGRAM(s) Oral every 12 hours  NIFEdipine XL 30 milliGRAM(s) Oral daily  regadenoson Injectable 0.4 milliGRAM(s) IV Push once  senna 2 Tablet(s) Oral at bedtime  sevelamer carbonate 800 milliGRAM(s) Oral three times a day with meals  tamsulosin 0.4 milliGRAM(s) Oral at bedtime    PRN MEDICATIONS  acetaminophen     Tablet .. 650 milliGRAM(s) Oral every 6 hours PRN  dextrose Oral Gel 15 Gram(s) Oral once PRN  melatonin 3 milliGRAM(s) Oral at bedtime PRN    VITALS:   T(F): 97.1  HR: 94  BP: 136/64  RR: 18  SpO2: --    LABS:                        7.8    8.56  )-----------( 223      ( 25 Nov 2022 13:33 )             23.7     11-25    132<L>  |  95<L>  |  53<H>  ----------------------------<  109<H>  5.7<H>   |  24  |  6.1<HH>    Ca    8.2<L>      25 Nov 2022 13:33  Mg     2.0     11-25    TPro  7.1  /  Alb  3.4<L>  /  TBili  0.4  /  DBili  x   /  AST  <5  /  ALT  <5  /  AlkPhos  158<H>  11-25    PT/INR - ( 25 Nov 2022 06:07 )   PT: 13.70 sec;   INR: 1.20 ratio         PTT - ( 25 Nov 2022 06:07 )  PTT:32.4 sec              RADIOLOGY:    PHYSICAL EXAM:  GEN: No acute distress  LUNGS: Clear to auscultation bilaterally   HEART: S1/S2 present. RRR.   ABD/ GI: Soft, non-tender, non-distended. Bowel sounds present  EXT: lt heel ulcer  NEURO: AAOX3

## 2022-11-26 NOTE — PROGRESS NOTE ADULT - SUBJECTIVE AND OBJECTIVE BOX
Podiatry Progress Note    Subjective:  SCHWABACHER, LAWRENCE is a  62y Male.   Seen bedside.   Patient is a 62y old  Male who presents with a chief complaint of Chest pain (26 Nov 2022 11:05)      Past Medical History and Surgical History  PAST MEDICAL & SURGICAL HISTORY:  CAD (coronary artery disease)  1 stent 2008      S/P CABG (coronary artery bypass graft)  x4      Diabetes mellitus      Transient ischemic attack (TIA)  2017; 2008      Chronic kidney disease (CKD)  Stage IV      Hypertension      Stented coronary artery  in 2008      Myocardial infarction  2012      PAD (peripheral artery disease)  S/p bypass left leg      HLD (hyperlipidemia)      BPH (benign prostatic hyperplasia)      Pain in left knee  s/p fall      OA (osteoarthritis)      [place]hard of hearing[place]      Chronic anemia      S/P CABG (coronary artery bypass graft)  2012      H/O arterial bypass of lower limb  Left Lower Extremity (2016)      History of surgery  Left CEA (2017)  Left Pinkie toe Amputation (2014)  CABG x 4 (2012)  Card cath - stent (2008)        AV fistula  2019  LEFT AV FISTULA           Objective:  Vital Signs Last 24 Hrs  T(C): 36.2 (26 Nov 2022 10:56), Max: 36.3 (25 Nov 2022 23:35)  T(F): 97.1 (26 Nov 2022 10:56), Max: 97.3 (25 Nov 2022 23:35)  HR: 94 (26 Nov 2022 10:56) (90 - 104)  BP: 136/64 (26 Nov 2022 10:56) (120/81 - 149/63)  BP(mean): --  RR: 18 (26 Nov 2022 10:56) (18 - 18)  SpO2: --                            7.8    8.56  )-----------( 223      ( 25 Nov 2022 13:33 )             23.7                 11-25    132<L>  |  95<L>  |  53<H>  ----------------------------<  109<H>  5.7<H>   |  24  |  6.1<HH>    Ca    8.2<L>      25 Nov 2022 13:33  Mg     2.0     11-25    TPro  7.1  /  Alb  3.4<L>  /  TBili  0.4  /  DBili  x   /  AST  <5  /  ALT  <5  /  AlkPhos  158<H>  11-25        Physical Exam - Lower Extremity Focused:   Derm: Open Left Plantar Ulcer @ Plantar Medial Rearfoot;    -Wound Base Fibrogranular; 50% Fibrous, 50% Granular; moderate serosanguinous drainage    -Wound Margins Irregular; mild macerated borders    - measures 7.0 x 5.5 x 2.4cm;   Mild Cellulitis w/ Erythema of Left Lower Extremity; improved since previous visit on 11/7/22;  Noted mild decrease in periwound erythema  Vascular: DP and PT Pulses Diminished; Foot is Warm to Warm to the touch; Capillary Refill Time < 3 Seconds;    Neuro: Protective Sensation Diminished / Moderately Neuropathic   MSK: No Pain On Palpation at Wound Site, Charcot deformity    Assessment:  - S/P Excisional Debridement of Soft Tissue & Bone, Left Foot; (DOS: 10/21/22)   - S/P exc dbx st/b Left foot 10/24/22    Plan:  Chart reviewed and Patient evaluated. All Questions and Concerns Addressed and Answered  Local Wound Care; Wound Flushed w/ NS; Wound Packed w/ Xeroform / DSD / Kerlix /ACE   Weight Bearing Status; WBAT w/ Heel Touch w/ Surgical Shoe;   pt is scheduled for surgery on Tuesday, 11/29  Please make the pt NPO 11/28 by MN  Continue w/ Local Wound Care; Q24 Dressing Changes;  Discussed Plan w/ Attending; Dr. Garvin    Podiatry

## 2022-11-26 NOTE — PROGRESS NOTE ADULT - ASSESSMENT
63 yo male, h/o type 2 DM on insulin, CAD s.p stent 2008, s/p CABG 2012 (Dr Smith), PAD s/p angioplasty and stent b/l LE, BPH, ESRD on HD through left AVF (MWF follows with Dr MARY Paula), DFU sp left toe amputation, left calcaneal OM on daptomycin and cefepime post dialysis s.p wound Vac, chronic anemia, HFmrEF, presented to the hospital for chest pain. Found to have anemia s/p 1 unit of prbc.    ESRD on HD/ Anemia / Chest pain/ OM on abx  HD in AM   RAMSEY weekly post HD 60 mcg/ transfuse if Hb< 7  on sevelamer 800 mg po tid/ ph at goal / repeat please   on lasix non oliguric    BP controlled  abx for OM - Cefepime  s/p debridement with podiatry 11/21/cardiology f/up noted / OR rescheduled positive covid     will follow

## 2022-11-26 NOTE — PROGRESS NOTE ADULT - ASSESSMENT
63 yo male, h/o type 2 DM on insulin, CAD s.p stent 2008, s/p CABG 2012 (Dr Smith), PAD s/p angioplasty and stent b/l LE, BPH, ESRD on HD through left AVF (MWF follows with Dr MARY Paula), DFU sp left toe amputation, left calcaneal OM on daptomycin and cefepime post dialysis s.p wound Vac, chronic anemia, HFmrEF, presented to the hospital for chest pain     1.Recent hx Left calcaneal OM   - Wound Cx 9/16 - Enterobacter cloacae  - wound CX 10/16 VRE Faecium Enterobacter cloacae complex, Proteus vulgaris, Morganella morganii   - s/p debridement 10/21 Wound Cx Proteus mirabilis, VRE faecium, Pseudomonas putida  - s/p debridement 10/24 Crumble L calcaneous   - s/p debridement 11/3 with wound vac in place  - Cefepime 2g post HD through 11/30; s/p 3 session of daptomycin post-HD  - On last admission, ID plan for 4 weeks from last debridement (11/3-11/30)  -Debridement  for Friday 11/25/2022 - again cancelled by podiatry now planned for 11/29     2. NSVT  -10 beats of NSVT observed on tele on 11/16 PM  -Started Metoprolol 12.5 BID  -Maintain K>4 and Mg>2    3. Anemia of chronic disease s/p 1 PRBC   - Monitor CBC, transfuse to keep >8    4.  ESRD on HD   - HD per nephrology  - B/L pitting edema, little urine output    5. Chest pain. hx of CAD s/p PCI/CABG and PAD s/p angioplasty  ACS/CAD presenting with Chest pain  -  on aspirin and plavix   - statin 40mg HS   - Troponin stable between 0.7-0.8  NST11/18/22: FIXED PERFUSION DEFECT INVOLVING ANTERIOR WALL OF THE LEFT VENTRICLE EXTENDING TO APEX CONSISTENT WITH SCAR. GLOBAL HYPOKINESIS WITH POOR THICKENING OF THE ANTERIOR WALL. EF  36 %   < from: TTE Echo Complete w/ Contrast w/ Doppler (11.18.22 @ 08:35) >  Mildly decreased global left ventricular systolic function with mild   concentric hypertrophy and normal internal cavity size.   2. With echocontrast, a moderate sized apical aneurysm is noted. There   is no evidence of LV thrombus.   3. Left ventricular ejection fraction, by visual estimation, is 45 to   50%.   4. Grade III diastolic dysfunction with elevated left atrial and left   ventricular end-diastolic pressures.    6.  PAD  - s/p angiogram LLE with peroneal artery angioplasty and arterectomy 10/27  - Cont meds as above    7. Type 2 DM  - Adjusted insulin due to hypoglycemia in am  - Lantus was decreased from 20u to 13unit     8.  BPH  - c/w Tamsulosin 0.4 mg daily  9. Covid positive-- asymptomatic-- no treatment indicated    Pending: Podiatry procedure  -DVT prophylaxis: Heparin  -GI prophylaxis: Not indicated  -Diet: Renal, 1.2l fluid restriction  -Code status: Full code    foot debridement  rescheduled for tuesday now 11/29

## 2022-11-26 NOTE — PROGRESS NOTE ADULT - SUBJECTIVE AND OBJECTIVE BOX
LAWRENCE SCHWABACHER 62y Male  MRN#: [id number]     Hospital Day: 11d    Pt is currently admitted with the primary diagnosis of chest pain     SUBJECTIVE  Hospital course:    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 and cefepime post dialysis s.p wound Vac, chronic anemia, HFmrEF, presented to the hospital for chest pain   Pain is mid sternal, pressure like, discomfort rather than pain, occurring at rest, non radiating, exacerbated by cough, not similar to previous episodes of chest pain, not associated with tremor , diaphoresis, numbness, lightheadedness; Pain has improved with tylenol initially and then resolved with nitroglycerin SL   Patient denies fever, chills, shortness of breath, sputum, abdominal pain; ROS otherwise negative    Troponin 0.70 in setting of ESRD, 0.25 back in 09/2022  - Troponin stable between 0.7-0.8  - TTE LVEF 45-50%, Grade 3 diastolic dysfunction  NST:  NO EVIDENCE FOR ISCHEMIA DURING LEXISCAN INFUSION. FIXED PERFUSION   DEFECT INVOLVING ANTERIOR WALL OF THE LEFT VENTRICLE EXTENDING TO APEX   CONSISTENT WITH SCAR. GLOBAL HYPOKINESIS WITH POOR THICKENING OF THE ANTERIOR WALL. LEFT VENTRICULAR EJECTION FRACTION OF  36 % WHICH IS LOW.  - c/w ASA 81 mg daily- c/w Plavix 75 mg daily- c/w atorvastatin 40 mg daily- c/w Nifedipine 30 mg daily  EKG: Left anterior fascicular block ST & T wave abnormality, consider lateral ischemia. Prolonged QT    Overnight events: none                                            ----------------------------------------------------------  OBJECTIVE  PAST MEDICAL & SURGICAL HISTORY  CAD (coronary artery disease)  1 stent 2008    S/P CABG (coronary artery bypass graft)  x4    Diabetes mellitus    Transient ischemic attack (TIA)  2017; 2008    Chronic kidney disease (CKD)  Stage IV    Hypertension    Stented coronary artery  in 2008    Myocardial infarction  2012    PAD (peripheral artery disease)  S/p bypass left leg    HLD (hyperlipidemia)    BPH (benign prostatic hyperplasia)    Pain in left knee  s/p fall    OA (osteoarthritis)    [place]    Chronic anemia    S/P CABG (coronary artery bypass graft)  2012    H/O arterial bypass of lower limb  Left Lower Extremity (2016)    History of surgery  Left CEA (2017)  Left Pinkie toe Amputation (2014)  CABG x 4 (2012)  Card cath - stent (2008)      AV fistula  2019  LEFT AV FISTULA                                              -----------------------------------------------------------  ALLERGIES:  No Known Allergies                                            ------------------------------------------------------------    HOME MEDICATIONS  Home Medications:  aspirin 81 mg oral tablet: 1 tab(s) orally once a day (14 Sep 2022 17:42)  cefepime 2 g intravenous injection: 2 granules intravenous Monday, Wednesday, and Friday post dialysis (14 Nov 2022 08:01)  DAPTOmycin 500 mg intravenous injection: 900 milligram(s) intravenous Monday, Wednesday, and Friday post dialysis (14 Nov 2022 08:02)  furosemide 40 mg oral tablet: 1 tab(s) orally once a day (14 Nov 2022 07:59)  Levemir 100 units/mL subcutaneous solution: 40 unit(s) subcutaneous once a day (at bedtime) (14 Sep 2022 17:42)  NIFEdipine 30 mg oral tablet, extended release: 1 tab(s) orally once a day (07 Nov 2022 13:45)  nitroglycerin 0.4 mg sublingual spray: 1 tab(s) sublingual every 5 minutes, As Needed up to 3 tabs (14 Nov 2022 08:05)  Plavix 75 mg oral tablet: 1 tab(s) orally once a day (14 Sep 2022 17:42)  Senna 8.6 mg oral tablet: 2 tab(s) orally once a day (at bedtime) (14 Nov 2022 08:03)  sevelamer carbonate 800 mg oral tablet: 1 tab(s) orally 3 times a day (with meals) (20 Sep 2022 11:36)  tamsulosin 0.4 mg oral capsule: 1 cap(s) orally once a day (at bedtime) (07 Nov 2022 13:45)  Trulicity Pen 1.5 mg/0.5 mL subcutaneous solution: 1.5  subcutaneous once a week (14 Sep 2022 17:42)  Tylenol 325 mg oral tablet: 2 tab(s) orally every 6 hours, As Needed (14 Nov 2022 08:03)                           MEDICATIONS:  STANDING MEDICATIONS  aspirin enteric coated 81 milliGRAM(s) Oral daily  atorvastatin 40 milliGRAM(s) Oral at bedtime  cefepime   IVPB 2000 milliGRAM(s) IV Intermittent <User Schedule>  clopidogrel Tablet 75 milliGRAM(s) Oral daily  cyanocobalamin 1000 MICROGram(s) Oral daily  darbepoetin Injectable Syringe 60 MICROGram(s) IV Push <User Schedule>  dextrose 5%. 1000 milliLiter(s) IV Continuous <Continuous>  dextrose 5%. 1000 milliLiter(s) IV Continuous <Continuous>  dextrose 50% Injectable 25 Gram(s) IV Push once  dextrose 50% Injectable 12.5 Gram(s) IV Push once  dextrose 50% Injectable 25 Gram(s) IV Push once  famotidine    Tablet 20 milliGRAM(s) Oral daily  folic acid 1 milliGRAM(s) Oral daily  furosemide    Tablet 40 milliGRAM(s) Oral daily  glucagon  Injectable 1 milliGRAM(s) IntraMuscular once  heparin   Injectable 5000 Unit(s) SubCutaneous every 8 hours  influenza   Vaccine 0.5 milliLiter(s) IntraMuscular once  insulin glargine Injectable (LANTUS) 13 Unit(s) SubCutaneous at bedtime  insulin lispro (ADMELOG) corrective regimen sliding scale   SubCutaneous three times a day before meals  insulin lispro Injectable (ADMELOG) 5 Unit(s) SubCutaneous before breakfast  insulin lispro Injectable (ADMELOG) 5 Unit(s) SubCutaneous before lunch  metoprolol tartrate 12.5 milliGRAM(s) Oral every 12 hours  NIFEdipine XL 30 milliGRAM(s) Oral daily  regadenoson Injectable 0.4 milliGRAM(s) IV Push once  senna 2 Tablet(s) Oral at bedtime  sevelamer carbonate 800 milliGRAM(s) Oral three times a day with meals  tamsulosin 0.4 milliGRAM(s) Oral at bedtime    PRN MEDICATIONS  acetaminophen     Tablet .. 650 milliGRAM(s) Oral every 6 hours PRN  dextrose Oral Gel 15 Gram(s) Oral once PRN  melatonin 3 milliGRAM(s) Oral at bedtime PRN                                            ------------------------------------------------------------    11-24-22 @ 07:01  -  11-25-22 @ 07:00  --------------------------------------------------------  IN: 360 mL / OUT: 0 mL / NET: 360 mL                                                 --------------------------------------------------------------  LABS:               LABS:                        7.8    8.56  )-----------( 223      ( 25 Nov 2022 13:33 )             23.7     11-25    132<L>  |  95<L>  |  53<H>  ----------------------------<  109<H>  5.7<H>   |  24  |  6.1<HH>    Ca    8.2<L>      25 Nov 2022 13:33  Mg     2.0     11-25    TPro  7.1  /  Alb  3.4<L>  /  TBili  0.4  /  DBili  x   /  AST  <5  /  ALT  <5  /  AlkPhos  158<H>  11-25    PT/INR - ( 25 Nov 2022 06:07 )   PT: 13.70 sec;   INR: 1.20 ratio         PTT - ( 25 Nov 2022 06:07 )  PTT:32.4 sec                                                            -------------------------------------------------------------  RADIOLOGY:                                            --------------------------------------------------------------  VITAL SIGNS: Last 24 Hours  Vital Signs Last 24 Hrs  T(C): 36.2 (26 Nov 2022 10:56), Max: 36.3 (25 Nov 2022 23:35)  T(F): 97.1 (26 Nov 2022 10:56), Max: 97.3 (25 Nov 2022 23:35)  HR: 94 (26 Nov 2022 10:56) (90 - 104)  BP: 136/64 (26 Nov 2022 10:56) (120/81 - 149/63)  BP(mean): --  RR: 18 (26 Nov 2022 10:56) (18 - 18)  SpO2: --    PHYSICAL EXAM:  General: Awake, oriented and resting comfortably, no acute distress  Head: normocephalic, atraumatic  ENT:  moist mucous membranes  Cardiac: regular rate and rhythm, normal S1 and S2, no murmurs, rubs or gallops  Respiratory: clear to auscultation bilaterally, no wheezes, crackles or rhonchi, unlabored respirations  Abdomen: normoactive bowel sounds x4, non tender, nondistended  Ext:  No edema, bandages clean   Neuro: A&O x3, no focal neurological deficits                                                --------------------------------------------------------------    ASSESSMENT & PLAN    Past medical history and hospital course     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 and cefepime post dialysis s.p wound Vac, chronic anemia, HFmrEF, presented to the hospital for chest pain     # ACS/CAD presenting with Chest pain  #CAD s/p PCI/CABG  #PAD s/p angioplasty  #HTN/HLD  #Chronic HFmrEF  Troponin 0.70 in setting of ESRD, 0.25 back in 09/2022  - Troponin stable between 0.7-0.8  - TTE LVEF 45-50%, Grade 3 diastolic dysfunction  NST: FIXED PERFUSION DEFECT INVOLVING ANTERIOR WALL OF THE LEFT VENTRICLE EXTENDING TO APEX CONSISTENT WITH SCAR. GLOBAL HYPOKINESIS WITH POOR THICKENING OF THE ANTERIOR WALL. EF  36 %   - c/w ASA 81 mg daily  - c/w Plavix 75 mg daily  - c/w atorvastatin 40 mg daily  - c/w Nifedipine 30 mg daily  EKG: Left anterior fascicular block ST & T wave abnormality, consider lateral ischemia. Prolonged QT      #NSVT  -11/16 PM 10 beats of NSVT observed on tele   -Started Metoprolol 12.5 BID  -Maintain K>4 and Mg>2    # Anemia of chronic disease  - hb on admission 6.6 baseline 7.6. s/p 1U PRBC  -11/21 pt got 1 unit pRBCs   - Monitor CBC, transfuse to keep >8  -maintain active T&S     # ESRD on HD   - HD per nephrology  - B/L pitting edema, little urine output    #Recent hx Left calcaneal OM   - Wound Cx 9/16 - Enterobacter cloacae  - wound CX 10/16 VRE Faecium Enterobacter cloacae complex, Proteus vulgaris, Morganella morganii   - s/p debridement 10/21 Wound Cx Proteus mirabilis, VRE faecium, Pseudomonas putida  - s/p debridement 10/24 Crumble L calcaneous   - s/p debridement 11/3 with wound vac in place  - Cefepime 2g post HD through 11/30; s/p 3 session of daptomycin post-HD  - On last admission, ID plan for 4 weeks from last debridement (11/3-11/30)  -Debridement 11/21 PM cancelled  -Rescheduled debridement for 11/25  -Debridement cancelled again-rescheduled for next week  -pt requested PT until surgery     # PAD  - s/p angiogram LLE with peroneal artery angioplasty and arterectomy 10/27  - Cont meds as above    # Type 2 DM  Continue insulin regimen    # BPH  - c/w Tamsulosin 0.4 mg daily    Pending: Podiatry procedure  -DVT prophylaxis: Heparin  -GI prophylaxis: Not indicated  -Diet: Renal, 1.2l fluid restriction  -Code status: Full code        # Handoff

## 2022-11-26 NOTE — PROGRESS NOTE ADULT - SUBJECTIVE AND OBJECTIVE BOX
Nephrology progress note  Patient is seen and examined, events over the last 24 h noted .  Lying in bed comfortable     Allergies:  No Known Allergies    Hospital Medications:   MEDICATIONS  (STANDING):  aspirin enteric coated 81 milliGRAM(s) Oral daily  atorvastatin 40 milliGRAM(s) Oral at bedtime  cefepime   IVPB 2000 milliGRAM(s) IV Intermittent <User Schedule>  clopidogrel Tablet 75 milliGRAM(s) Oral daily  cyanocobalamin 1000 MICROGram(s) Oral daily  darbepoetin Injectable Syringe 60 MICROGram(s) IV Push <User Schedule>  famotidine    Tablet 20 milliGRAM(s) Oral daily  folic acid 1 milliGRAM(s) Oral daily  furosemide    Tablet 40 milliGRAM(s) Oral daily  glucagon  Injectable 1 milliGRAM(s) IntraMuscular once  guaifenesin/dextromethorphan Oral Liquid 5 milliLiter(s) Oral three times a day  heparin   Injectable 5000 Unit(s) SubCutaneous every 8 hours  influenza   Vaccine 0.5 milliLiter(s) IntraMuscular once  insulin glargine Injectable (LANTUS) 13 Unit(s) SubCutaneous at bedtime  insulin lispro (ADMELOG) corrective regimen sliding scale   SubCutaneous three times a day before meals  insulin lispro Injectable (ADMELOG) 5 Unit(s) SubCutaneous before breakfast  insulin lispro Injectable (ADMELOG) 5 Unit(s) SubCutaneous before lunch  metoprolol tartrate 12.5 milliGRAM(s) Oral every 12 hours  NIFEdipine XL 30 milliGRAM(s) Oral daily  regadenoson Injectable 0.4 milliGRAM(s) IV Push once  senna 2 Tablet(s) Oral at bedtime  sevelamer carbonate 800 milliGRAM(s) Oral three times a day with meals  tamsulosin 0.4 milliGRAM(s) Oral at bedtime        VITALS:  T(F): 97.1 (11-26-22 @ 10:56), Max: 97.3 (11-25-22 @ 23:35)  HR: 94 (11-26-22 @ 10:56)  BP: 136/64 (11-26-22 @ 10:56)  RR: 18 (11-26-22 @ 10:56)      11-24 @ 07:01  -  11-25 @ 07:00  --------------------------------------------------------  IN: 360 mL / OUT: 0 mL / NET: 360 mL    11-25 @ 07:01  -  11-26 @ 07:00  --------------------------------------------------------  IN: 0 mL / OUT: 3000 mL / NET: -3000 mL          PHYSICAL EXAM:  Constitutional: NAD  Neck: No JVD  Respiratory: CTAB,   Cardiovascular: S1, S2, RRR  Gastrointestinal: BS+, soft, NT/ND  Extremities: No cyanosis or clubbing. No peripheral edema  :  No schneider.   Skin: No rashes    LABS:  11-25    132<L>  |  95<L>  |  53<H>  ----------------------------<  109<H>  5.7<H>   |  24  |  6.1<HH>    Ca    8.2<L>      25 Nov 2022 13:33  Mg     2.0     11-25    TPro  7.1  /  Alb  3.4<L>  /  TBili  0.4  /  DBili      /  AST  <5  /  ALT  <5  /  AlkPhos  158<H>  11-25                          7.8    8.56  )-----------( 223      ( 25 Nov 2022 13:33 )             23.7       Urine Studies:        Iron 39, TIBC 133, %sat 29      [10-21-22 @ 10:30]  Ferritin 1126      [10-21-22 @ 10:30]  PTH -- (Ca 7.5)      [02-26-22 @ 16:00]   312  Vitamin D (25OH) 21      [02-26-22 @ 16:00]  HbA1c 5.8      [01-30-20 @ 06:46]  Lipid: chol 81, , HDL 22, LDL --      [10-15-22 @ 09:53]    HBsAb <3.0      [12-29-19 @ 17:44]  HBsAb Nonreact      [11-03-22 @ 06:40]  HBsAg Nonreact      [11-03-22 @ 06:40]  HBcAb Nonreact      [11-03-22 @ 06:40]  HCV 0.14, Nonreact      [10-27-22 @ 04:09]        RADIOLOGY & ADDITIONAL STUDIES:

## 2022-11-27 LAB
ANION GAP SERPL CALC-SCNC: 15 MMOL/L — HIGH (ref 7–14)
BUN SERPL-MCNC: 56 MG/DL — HIGH (ref 10–20)
CALCIUM SERPL-MCNC: 8.4 MG/DL — SIGNIFICANT CHANGE UP (ref 8.4–10.5)
CHLORIDE SERPL-SCNC: 96 MMOL/L — LOW (ref 98–110)
CO2 SERPL-SCNC: 24 MMOL/L — SIGNIFICANT CHANGE UP (ref 17–32)
CREAT SERPL-MCNC: 6.1 MG/DL — CRITICAL HIGH (ref 0.7–1.5)
EGFR: 10 ML/MIN/1.73M2 — LOW
GLUCOSE BLDC GLUCOMTR-MCNC: 105 MG/DL — HIGH (ref 70–99)
GLUCOSE BLDC GLUCOMTR-MCNC: 120 MG/DL — HIGH (ref 70–99)
GLUCOSE BLDC GLUCOMTR-MCNC: 181 MG/DL — HIGH (ref 70–99)
GLUCOSE BLDC GLUCOMTR-MCNC: 185 MG/DL — HIGH (ref 70–99)
GLUCOSE SERPL-MCNC: 85 MG/DL — SIGNIFICANT CHANGE UP (ref 70–99)
HCT VFR BLD CALC: 24.4 % — LOW (ref 42–52)
HGB BLD-MCNC: 7.9 G/DL — LOW (ref 14–18)
MAGNESIUM SERPL-MCNC: 2.1 MG/DL — SIGNIFICANT CHANGE UP (ref 1.8–2.4)
MCHC RBC-ENTMCNC: 30.2 PG — SIGNIFICANT CHANGE UP (ref 27–31)
MCHC RBC-ENTMCNC: 32.4 G/DL — SIGNIFICANT CHANGE UP (ref 32–37)
MCV RBC AUTO: 93.1 FL — SIGNIFICANT CHANGE UP (ref 80–94)
NRBC # BLD: 0 /100 WBCS — SIGNIFICANT CHANGE UP (ref 0–0)
PLATELET # BLD AUTO: 169 K/UL — SIGNIFICANT CHANGE UP (ref 130–400)
POTASSIUM SERPL-MCNC: 5.2 MMOL/L — HIGH (ref 3.5–5)
POTASSIUM SERPL-SCNC: 5.2 MMOL/L — HIGH (ref 3.5–5)
RBC # BLD: 2.62 M/UL — LOW (ref 4.7–6.1)
RBC # FLD: 16.1 % — HIGH (ref 11.5–14.5)
SARS-COV-2 RNA SPEC QL NAA+PROBE: DETECTED
SODIUM SERPL-SCNC: 135 MMOL/L — SIGNIFICANT CHANGE UP (ref 135–146)
WBC # BLD: 5.63 K/UL — SIGNIFICANT CHANGE UP (ref 4.8–10.8)
WBC # FLD AUTO: 5.63 K/UL — SIGNIFICANT CHANGE UP (ref 4.8–10.8)

## 2022-11-27 PROCEDURE — 99233 SBSQ HOSP IP/OBS HIGH 50: CPT

## 2022-11-27 RX ADMIN — Medication 5 UNIT(S): at 08:26

## 2022-11-27 RX ADMIN — TAMSULOSIN HYDROCHLORIDE 0.4 MILLIGRAM(S): 0.4 CAPSULE ORAL at 22:05

## 2022-11-27 RX ADMIN — Medication 5 MILLILITER(S): at 22:04

## 2022-11-27 RX ADMIN — INSULIN GLARGINE 13 UNIT(S): 100 INJECTION, SOLUTION SUBCUTANEOUS at 22:04

## 2022-11-27 RX ADMIN — FAMOTIDINE 20 MILLIGRAM(S): 10 INJECTION INTRAVENOUS at 11:33

## 2022-11-27 RX ADMIN — CLOPIDOGREL BISULFATE 75 MILLIGRAM(S): 75 TABLET, FILM COATED ORAL at 11:33

## 2022-11-27 RX ADMIN — Medication 1: at 11:31

## 2022-11-27 RX ADMIN — Medication 12.5 MILLIGRAM(S): at 17:10

## 2022-11-27 RX ADMIN — HEPARIN SODIUM 5000 UNIT(S): 5000 INJECTION INTRAVENOUS; SUBCUTANEOUS at 06:21

## 2022-11-27 RX ADMIN — SENNA PLUS 2 TABLET(S): 8.6 TABLET ORAL at 22:05

## 2022-11-27 RX ADMIN — SEVELAMER CARBONATE 800 MILLIGRAM(S): 2400 POWDER, FOR SUSPENSION ORAL at 08:27

## 2022-11-27 RX ADMIN — HEPARIN SODIUM 5000 UNIT(S): 5000 INJECTION INTRAVENOUS; SUBCUTANEOUS at 13:08

## 2022-11-27 RX ADMIN — Medication 5 MILLILITER(S): at 06:25

## 2022-11-27 RX ADMIN — Medication 5 UNIT(S): at 11:32

## 2022-11-27 RX ADMIN — SEVELAMER CARBONATE 800 MILLIGRAM(S): 2400 POWDER, FOR SUSPENSION ORAL at 17:10

## 2022-11-27 RX ADMIN — Medication 30 MILLIGRAM(S): at 06:21

## 2022-11-27 RX ADMIN — Medication 40 MILLIGRAM(S): at 06:21

## 2022-11-27 RX ADMIN — PREGABALIN 1000 MICROGRAM(S): 225 CAPSULE ORAL at 11:33

## 2022-11-27 RX ADMIN — Medication 5 MILLILITER(S): at 13:08

## 2022-11-27 RX ADMIN — SEVELAMER CARBONATE 800 MILLIGRAM(S): 2400 POWDER, FOR SUSPENSION ORAL at 11:32

## 2022-11-27 RX ADMIN — Medication 1 MILLIGRAM(S): at 11:33

## 2022-11-27 RX ADMIN — Medication 81 MILLIGRAM(S): at 11:33

## 2022-11-27 RX ADMIN — ATORVASTATIN CALCIUM 40 MILLIGRAM(S): 80 TABLET, FILM COATED ORAL at 22:06

## 2022-11-27 RX ADMIN — HEPARIN SODIUM 5000 UNIT(S): 5000 INJECTION INTRAVENOUS; SUBCUTANEOUS at 22:07

## 2022-11-27 RX ADMIN — Medication 12.5 MILLIGRAM(S): at 06:21

## 2022-11-27 NOTE — PROGRESS NOTE ADULT - ASSESSMENT
63 yo male, h/o type 2 DM on insulin, CAD s.p stent 2008, s/p CABG 2012 (Dr Smith), PAD s/p angioplasty and stent b/l LE, BPH, ESRD on HD through left AVF (MWF follows with Dr MARY Paula), DFU sp left toe amputation, left calcaneal OM on daptomycin and cefepime post dialysis s.p wound Vac, chronic anemia, HFmrEF, presented to the hospital for chest pain. Found to have anemia s/p 1 unit of prbc.    ESRD on HD/ Anemia / Chest pain/ OM on abx  for HD in AM   RAMSEY weekly post HD 60 mcg/ transfuse if Hb< 7  on sevelamer 800 mg po tid/ ph at goal / repeat please   on lasix non oliguric    BP controlled  abx for OM - Cefepime  s/p debridement with podiatry 11/21/cardiology f/up noted / OR rescheduled positive covid     will follow

## 2022-11-27 NOTE — PROGRESS NOTE ADULT - SUBJECTIVE AND OBJECTIVE BOX
Nephrology progress note    THIS IS AN INCOMPLETE NOTE . FULL NOTE TO FOLLOW SHORTLY    Patient is seen and examined, events over the last 24 h noted .    Allergies:  No Known Allergies    Hospital Medications:   MEDICATIONS  (STANDING):  aspirin enteric coated 81 milliGRAM(s) Oral daily  atorvastatin 40 milliGRAM(s) Oral at bedtime  cefepime   IVPB 2000 milliGRAM(s) IV Intermittent <User Schedule>  clopidogrel Tablet 75 milliGRAM(s) Oral daily  cyanocobalamin 1000 MICROGram(s) Oral daily  darbepoetin Injectable Syringe 60 MICROGram(s) IV Push <User Schedule>  dextrose 5%. 1000 milliLiter(s) (100 mL/Hr) IV Continuous <Continuous>  dextrose 5%. 1000 milliLiter(s) (50 mL/Hr) IV Continuous <Continuous>  dextrose 50% Injectable 25 Gram(s) IV Push once  dextrose 50% Injectable 12.5 Gram(s) IV Push once  dextrose 50% Injectable 25 Gram(s) IV Push once  famotidine    Tablet 20 milliGRAM(s) Oral daily  folic acid 1 milliGRAM(s) Oral daily  furosemide    Tablet 40 milliGRAM(s) Oral daily  glucagon  Injectable 1 milliGRAM(s) IntraMuscular once  guaifenesin/dextromethorphan Oral Liquid 5 milliLiter(s) Oral three times a day  heparin   Injectable 5000 Unit(s) SubCutaneous every 8 hours  influenza   Vaccine 0.5 milliLiter(s) IntraMuscular once  insulin glargine Injectable (LANTUS) 13 Unit(s) SubCutaneous at bedtime  insulin lispro (ADMELOG) corrective regimen sliding scale   SubCutaneous three times a day before meals  insulin lispro Injectable (ADMELOG) 5 Unit(s) SubCutaneous before breakfast  insulin lispro Injectable (ADMELOG) 5 Unit(s) SubCutaneous before lunch  metoprolol tartrate 12.5 milliGRAM(s) Oral every 12 hours  NIFEdipine XL 30 milliGRAM(s) Oral daily  regadenoson Injectable 0.4 milliGRAM(s) IV Push once  senna 2 Tablet(s) Oral at bedtime  sevelamer carbonate 800 milliGRAM(s) Oral three times a day with meals  tamsulosin 0.4 milliGRAM(s) Oral at bedtime        VITALS:  T(F): 98.6 (11-27-22 @ 08:22), Max: 98.6 (11-27-22 @ 08:22)  HR: 89 (11-27-22 @ 08:22)  BP: 159/67 (11-27-22 @ 08:22)  RR: 18 (11-27-22 @ 08:22)  SpO2: --  Wt(kg): --    11-25 @ 07:01  -  11-26 @ 07:00  --------------------------------------------------------  IN: 0 mL / OUT: 3000 mL / NET: -3000 mL          PHYSICAL EXAM:  Constitutional: NAD  HEENT: anicteric sclera, oropharynx clear, MMM  Neck: No JVD  Respiratory: CTAB, no wheezes, rales or rhonchi  Cardiovascular: S1, S2, RRR  Gastrointestinal: BS+, soft, NT/ND  Extremities: No cyanosis or clubbing. No peripheral edema  :  No schneider.   Skin: No rashes    LABS:  11-25    132<L>  |  95<L>  |  53<H>  ----------------------------<  109<H>  5.7<H>   |  24  |  6.1<HH>    Ca    8.2<L>      25 Nov 2022 13:33  Mg     2.0     11-25    TPro  7.1  /  Alb  3.4<L>  /  TBili  0.4  /  DBili      /  AST  <5  /  ALT  <5  /  AlkPhos  158<H>  11-25                          7.9    5.63  )-----------( 169      ( 27 Nov 2022 08:51 )             24.4       Urine Studies:        Iron 39, TIBC 133, %sat 29      [10-21-22 @ 10:30]  Ferritin 1126      [10-21-22 @ 10:30]  PTH -- (Ca 7.5)      [02-26-22 @ 16:00]   312  Vitamin D (25OH) 21      [02-26-22 @ 16:00]  HbA1c 5.8      [01-30-20 @ 06:46]  Lipid: chol 81, , HDL 22, LDL --      [10-15-22 @ 09:53]    HBsAb <3.0      [12-29-19 @ 17:44]  HBsAb Nonreact      [11-03-22 @ 06:40]  HBsAg Nonreact      [11-03-22 @ 06:40]  HBcAb Nonreact      [11-03-22 @ 06:40]  HCV 0.14, Nonreact      [10-27-22 @ 04:09]        RADIOLOGY & ADDITIONAL STUDIES:   Nephrology progress note  Patient is seen and examined, events over the last 24 h noted .  lying in bed positive cough   Allergies:  No Known Allergies    Hospital Medications:   MEDICATIONS  (STANDING):  aspirin enteric coated 81 milliGRAM(s) Oral daily  atorvastatin 40 milliGRAM(s) Oral at bedtime  cefepime   IVPB 2000 milliGRAM(s) IV Intermittent <User Schedule>  clopidogrel Tablet 75 milliGRAM(s) Oral daily  cyanocobalamin 1000 MICROGram(s) Oral daily  darbepoetin Injectable Syringe 60 MICROGram(s) IV Push <User Schedule>  famotidine    Tablet 20 milliGRAM(s) Oral daily  folic acid 1 milliGRAM(s) Oral daily  furosemide    Tablet 40 milliGRAM(s) Oral daily  glucagon  Injectable 1 milliGRAM(s) IntraMuscular once  guaifenesin/dextromethorphan Oral Liquid 5 milliLiter(s) Oral three times a day  heparin   Injectable 5000 Unit(s) SubCutaneous every 8 hours  influenza   Vaccine 0.5 milliLiter(s) IntraMuscular once  insulin glargine Injectable (LANTUS) 13 Unit(s) SubCutaneous at bedtime  insulin lispro (ADMELOG) corrective regimen sliding scale   SubCutaneous three times a day before meals  insulin lispro Injectable (ADMELOG) 5 Unit(s) SubCutaneous before breakfast  insulin lispro Injectable (ADMELOG) 5 Unit(s) SubCutaneous before lunch  metoprolol tartrate 12.5 milliGRAM(s) Oral every 12 hours  NIFEdipine XL 30 milliGRAM(s) Oral daily  regadenoson Injectable 0.4 milliGRAM(s) IV Push once  senna 2 Tablet(s) Oral at bedtime  sevelamer carbonate 800 milliGRAM(s) Oral three times a day with meals  tamsulosin 0.4 milliGRAM(s) Oral at bedtime        VITALS:  T(F): 98.6 (11-27-22 @ 08:22), Max: 98.6 (11-27-22 @ 08:22)  HR: 89 (11-27-22 @ 08:22)  BP: 159/67 (11-27-22 @ 08:22)  RR: 18 (11-27-22 @ 08:22)      11-25 @ 07:01  -  11-26 @ 07:00  --------------------------------------------------------  IN: 0 mL / OUT: 3000 mL / NET: -3000 mL          PHYSICAL EXAM:  Constitutional: NAD  Neck: No JVD  Respiratory: CTAB  Cardiovascular: S1, S2, RRR  Gastrointestinal: BS+, soft, NT/ND  Extremities: No cyanosis or clubbing. No peripheral edema  :  No schneider.   Skin: No rashes    LABS:  11-27    135  |  96<L>  |  56<H>  ----------------------------<  85  5.2<H>   |  24  |  6.1<HH>    Ca    8.4      27 Nov 2022 08:51  Mg     2.1     11-27 11-25    132<L>  |  95<L>  |  53<H>  ----------------------------<  109<H>  5.7<H>   |  24  |  6.1<HH>    Ca    8.2<L>      25 Nov 2022 13:33  Mg     2.0     11-25    TPro  7.1  /  Alb  3.4<L>  /  TBili  0.4  /  DBili      /  AST  <5  /  ALT  <5  /  AlkPhos  158<H>  11-25                          7.9    5.63  )-----------( 169      ( 27 Nov 2022 08:51 )             24.4       Urine Studies:        Iron 39, TIBC 133, %sat 29      [10-21-22 @ 10:30]  Ferritin 1126      [10-21-22 @ 10:30]  PTH -- (Ca 7.5)      [02-26-22 @ 16:00]   312  Vitamin D (25OH) 21      [02-26-22 @ 16:00]  HbA1c 5.8      [01-30-20 @ 06:46]  Lipid: chol 81, , HDL 22, LDL --      [10-15-22 @ 09:53]    HBsAb <3.0      [12-29-19 @ 17:44]  HBsAb Nonreact      [11-03-22 @ 06:40]  HBsAg Nonreact      [11-03-22 @ 06:40]  HBcAb Nonreact      [11-03-22 @ 06:40]  HCV 0.14, Nonreact      [10-27-22 @ 04:09]        RADIOLOGY & ADDITIONAL STUDIES:

## 2022-11-27 NOTE — PROGRESS NOTE ADULT - SUBJECTIVE AND OBJECTIVE BOX
SUBJECTIVE:    Patient is a 62y old Male who presents with a chief complaint of Chest pain (27 Nov 2022 09:45)    Currently admitted to medicine with the primary diagnosis of Anemia       Today is hospital day 13d.     PAST MEDICAL & SURGICAL HISTORY  CAD (coronary artery disease)  1 stent 2008    S/P CABG (coronary artery bypass graft)  x4    Diabetes mellitus    Transient ischemic attack (TIA)  2017; 2008    Chronic kidney disease (CKD)  Stage IV    Hypertension    Stented coronary artery  in 2008    Myocardial infarction  2012    PAD (peripheral artery disease)  S/p bypass left leg    HLD (hyperlipidemia)    BPH (benign prostatic hyperplasia)    Pain in left knee  s/p fall    OA (osteoarthritis)    [place] (hard of hearing)    Chronic anemia    S/P CABG (coronary artery bypass graft)  2012    H/O arterial bypass of lower limb  Left Lower Extremity (2016)    History of surgery  Left CEA (2017)  Left Pinkie toe Amputation (2014)  CABG x 4 (2012)  Card cath - stent (2008)      AV fistula  2019  LEFT AV FISTULA      ALLERGIES:  No Known Allergies    MEDICATIONS:  STANDING MEDICATIONS  aspirin enteric coated 81 milliGRAM(s) Oral daily  atorvastatin 40 milliGRAM(s) Oral at bedtime  cefepime   IVPB 2000 milliGRAM(s) IV Intermittent <User Schedule>  clopidogrel Tablet 75 milliGRAM(s) Oral daily  cyanocobalamin 1000 MICROGram(s) Oral daily  darbepoetin Injectable Syringe 60 MICROGram(s) IV Push <User Schedule>  dextrose 5%. 1000 milliLiter(s) IV Continuous <Continuous>  dextrose 5%. 1000 milliLiter(s) IV Continuous <Continuous>  dextrose 50% Injectable 25 Gram(s) IV Push once  dextrose 50% Injectable 12.5 Gram(s) IV Push once  dextrose 50% Injectable 25 Gram(s) IV Push once  famotidine    Tablet 20 milliGRAM(s) Oral daily  folic acid 1 milliGRAM(s) Oral daily  furosemide    Tablet 40 milliGRAM(s) Oral daily  glucagon  Injectable 1 milliGRAM(s) IntraMuscular once  guaifenesin/dextromethorphan Oral Liquid 5 milliLiter(s) Oral three times a day  heparin   Injectable 5000 Unit(s) SubCutaneous every 8 hours  influenza   Vaccine 0.5 milliLiter(s) IntraMuscular once  insulin glargine Injectable (LANTUS) 13 Unit(s) SubCutaneous at bedtime  insulin lispro (ADMELOG) corrective regimen sliding scale   SubCutaneous three times a day before meals  insulin lispro Injectable (ADMELOG) 5 Unit(s) SubCutaneous before breakfast  insulin lispro Injectable (ADMELOG) 5 Unit(s) SubCutaneous before lunch  metoprolol tartrate 12.5 milliGRAM(s) Oral every 12 hours  NIFEdipine XL 30 milliGRAM(s) Oral daily  regadenoson Injectable 0.4 milliGRAM(s) IV Push once  senna 2 Tablet(s) Oral at bedtime  sevelamer carbonate 800 milliGRAM(s) Oral three times a day with meals  tamsulosin 0.4 milliGRAM(s) Oral at bedtime    PRN MEDICATIONS  acetaminophen     Tablet .. 650 milliGRAM(s) Oral every 6 hours PRN  dextrose Oral Gel 15 Gram(s) Oral once PRN  melatonin 3 milliGRAM(s) Oral at bedtime PRN    VITALS:   T(F): 98.6  HR: 89  BP: 159/67  RR: 18  SpO2: --    LABS:                        7.9    5.63  )-----------( 169      ( 27 Nov 2022 08:51 )             24.4     11-27    135  |  96<L>  |  56<H>  ----------------------------<  85  5.2<H>   |  24  |  6.1<HH>    Ca    8.4      27 Nov 2022 08:51  Mg     2.1     11-27    TPro  7.1  /  Alb  3.4<L>  /  TBili  0.4  /  DBili  x   /  AST  <5  /  ALT  <5  /  AlkPhos  158<H>  11-25                  RADIOLOGY:    PHYSICAL EXAM:  GEN: No acute distress  LUNGS: Clear to auscultation bilaterally   HEART: S1/S2 present. RRR.   ABD/ GI: Soft, non-tender, non-distended. Bowel sounds present  EXT:   NEURO: AAOX3

## 2022-11-27 NOTE — PROGRESS NOTE ADULT - ASSESSMENT
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 and cefepime post dialysis s.p wound Vac, chronic anemia, HFmrEF, presented to the hospital for chest pain     1.Recent hx Left calcaneal OM   - Wound Cx 9/16 - Enterobacter cloacae  - wound CX 10/16 VRE Faecium Enterobacter cloacae complex, Proteus vulgaris, Morganella morganii   - s/p debridement 10/21 Wound Cx Proteus mirabilis, VRE faecium, Pseudomonas putida  - s/p debridement 10/24 [place]rumble L calcaneous   - s/p debridement 11/3 with wound vac in place  - Cefepime 2g post HD through 11/30; s/p 3 session of daptomycin post-HD  - On last admission, ID plan for 4 weeks from last debridement (11/3-11/30)  -Debridement  for Friday 11/25/2022 - again cancelled by podiatry now planned for 11/29     2. NSVT  -10 beats of NSVT observed on tele on 11/16 PM  -Started Metoprolol 12.5 BID  -Maintain K>4 and Mg>2    3. Anemia of chronic disease s/p 1 PRBC   - Monitor CBC, transfuse to keep >8    4.  ESRD on HD   - HD per nephrology  - B/L pitting edema, little urine output    5. Chest pain. hx of CAD s/p PCI/CABG and PAD s/p angioplasty  ACS/CAD presenting with Chest pain  -  on aspirin and plavix   - statin 40mg HS   - Troponin stable between 0.7-0.8  NST11/18/22: FIXED PERFUSION DEFECT INVOLVING ANTERIOR WALL OF THE LEFT VENTRICLE EXTENDING TO APEX CONSISTENT WITH SCAR. GLOBAL HYPOKINESIS WITH POOR THICKENING OF THE ANTERIOR WALL. EF  36 %   < from: TTE Echo Complete w/ Contrast w/ Doppler (11.18.22 @ 08:35) >  Mildly decreased global left ventricular systolic function with mild   concentric hypertrophy and normal internal cavity size.   2. With echocontrast, a moderate sized apical aneurysm is noted. There   is no evidence of LV thrombus.   3. Left ventricular ejection fraction, by visual estimation, is 45 to   50%.   4. Grade III diastolic dysfunction with elevated left atrial and left   ventricular end-diastolic pressures.    6.  PAD  - s/p angiogram LLE with peroneal artery angioplasty and arterectomy 10/27  - Cont meds as above    7. Type 2 DM  - Adjusted insulin due to hypoglycemia in am  - Lantus was decreased from 20u to 13unit     8.  BPH  - c/w Tamsulosin 0.4 mg daily  9. Covid positive-- asymptomatic-- no treatment indicated-- repeat covid test    Pending: Podiatry procedure  -DVT prophylaxis: Heparin  -GI prophylaxis: protonix  -Diet: Renal, 1.2l fluid restriction  -Code status: Full code    foot debridement  rescheduled for tuesday now 11/29

## 2022-11-28 LAB
ALBUMIN SERPL ELPH-MCNC: 3.3 G/DL — LOW (ref 3.5–5.2)
ALP SERPL-CCNC: 175 U/L — HIGH (ref 30–115)
ALT FLD-CCNC: <5 U/L — SIGNIFICANT CHANGE UP (ref 0–41)
ANION GAP SERPL CALC-SCNC: 14 MMOL/L — SIGNIFICANT CHANGE UP (ref 7–14)
AST SERPL-CCNC: 7 U/L — SIGNIFICANT CHANGE UP (ref 0–41)
BILIRUB DIRECT SERPL-MCNC: <0.2 MG/DL — SIGNIFICANT CHANGE UP (ref 0–0.3)
BILIRUB INDIRECT FLD-MCNC: >0.1 MG/DL — LOW (ref 0.2–1.2)
BILIRUB SERPL-MCNC: 0.3 MG/DL — SIGNIFICANT CHANGE UP (ref 0.2–1.2)
BUN SERPL-MCNC: 69 MG/DL — CRITICAL HIGH (ref 10–20)
CALCIUM SERPL-MCNC: 8.2 MG/DL — LOW (ref 8.4–10.5)
CHLORIDE SERPL-SCNC: 96 MMOL/L — LOW (ref 98–110)
CO2 SERPL-SCNC: 23 MMOL/L — SIGNIFICANT CHANGE UP (ref 17–32)
CREAT SERPL-MCNC: 4 MG/DL — HIGH (ref 0.7–1.5)
CREAT SERPL-MCNC: 6.8 MG/DL — CRITICAL HIGH (ref 0.7–1.5)
EGFR: 16 ML/MIN/1.73M2 — LOW
EGFR: 8 ML/MIN/1.73M2 — LOW
GLUCOSE BLDC GLUCOMTR-MCNC: 139 MG/DL — HIGH (ref 70–99)
GLUCOSE BLDC GLUCOMTR-MCNC: 156 MG/DL — HIGH (ref 70–99)
GLUCOSE BLDC GLUCOMTR-MCNC: 169 MG/DL — HIGH (ref 70–99)
GLUCOSE BLDC GLUCOMTR-MCNC: 192 MG/DL — HIGH (ref 70–99)
GLUCOSE BLDC GLUCOMTR-MCNC: 74 MG/DL — SIGNIFICANT CHANGE UP (ref 70–99)
GLUCOSE SERPL-MCNC: 85 MG/DL — SIGNIFICANT CHANGE UP (ref 70–99)
HCT VFR BLD CALC: 22.3 % — LOW (ref 42–52)
HGB BLD-MCNC: 7.2 G/DL — LOW (ref 14–18)
INR BLD: 1.14 RATIO — SIGNIFICANT CHANGE UP (ref 0.65–1.3)
MAGNESIUM SERPL-MCNC: 2 MG/DL — SIGNIFICANT CHANGE UP (ref 1.8–2.4)
MCHC RBC-ENTMCNC: 29.5 PG — SIGNIFICANT CHANGE UP (ref 27–31)
MCHC RBC-ENTMCNC: 32.3 G/DL — SIGNIFICANT CHANGE UP (ref 32–37)
MCV RBC AUTO: 91.4 FL — SIGNIFICANT CHANGE UP (ref 80–94)
NRBC # BLD: 0 /100 WBCS — SIGNIFICANT CHANGE UP (ref 0–0)
PLATELET # BLD AUTO: 170 K/UL — SIGNIFICANT CHANGE UP (ref 130–400)
POTASSIUM SERPL-MCNC: 5.5 MMOL/L — HIGH (ref 3.5–5)
POTASSIUM SERPL-SCNC: 5.5 MMOL/L — HIGH (ref 3.5–5)
PROT SERPL-MCNC: 7.3 G/DL — SIGNIFICANT CHANGE UP (ref 6–8)
PROTHROM AB SERPL-ACNC: 13 SEC — HIGH (ref 9.95–12.87)
RBC # BLD: 2.44 M/UL — LOW (ref 4.7–6.1)
RBC # FLD: 15.7 % — HIGH (ref 11.5–14.5)
SODIUM SERPL-SCNC: 133 MMOL/L — LOW (ref 135–146)
WBC # BLD: 6.19 K/UL — SIGNIFICANT CHANGE UP (ref 4.8–10.8)
WBC # FLD AUTO: 6.19 K/UL — SIGNIFICANT CHANGE UP (ref 4.8–10.8)

## 2022-11-28 PROCEDURE — 99233 SBSQ HOSP IP/OBS HIGH 50: CPT

## 2022-11-28 PROCEDURE — 71045 X-RAY EXAM CHEST 1 VIEW: CPT | Mod: 26

## 2022-11-28 RX ORDER — REMDESIVIR 5 MG/ML
200 INJECTION INTRAVENOUS EVERY 24 HOURS
Refills: 0 | Status: COMPLETED | OUTPATIENT
Start: 2022-11-28 | End: 2022-11-28

## 2022-11-28 RX ORDER — REMDESIVIR 5 MG/ML
100 INJECTION INTRAVENOUS EVERY 24 HOURS
Refills: 0 | Status: COMPLETED | OUTPATIENT
Start: 2022-11-29 | End: 2022-11-30

## 2022-11-28 RX ORDER — REMDESIVIR 5 MG/ML
INJECTION INTRAVENOUS
Refills: 0 | Status: COMPLETED | OUTPATIENT
Start: 2022-11-28 | End: 2022-11-30

## 2022-11-28 RX ADMIN — SEVELAMER CARBONATE 800 MILLIGRAM(S): 2400 POWDER, FOR SUSPENSION ORAL at 08:07

## 2022-11-28 RX ADMIN — ATORVASTATIN CALCIUM 40 MILLIGRAM(S): 80 TABLET, FILM COATED ORAL at 23:12

## 2022-11-28 RX ADMIN — Medication 5 MILLILITER(S): at 14:18

## 2022-11-28 RX ADMIN — Medication 1 MILLIGRAM(S): at 12:21

## 2022-11-28 RX ADMIN — FAMOTIDINE 20 MILLIGRAM(S): 10 INJECTION INTRAVENOUS at 12:21

## 2022-11-28 RX ADMIN — Medication 12.5 MILLIGRAM(S): at 17:15

## 2022-11-28 RX ADMIN — HEPARIN SODIUM 5000 UNIT(S): 5000 INJECTION INTRAVENOUS; SUBCUTANEOUS at 14:21

## 2022-11-28 RX ADMIN — HEPARIN SODIUM 5000 UNIT(S): 5000 INJECTION INTRAVENOUS; SUBCUTANEOUS at 05:48

## 2022-11-28 RX ADMIN — SENNA PLUS 2 TABLET(S): 8.6 TABLET ORAL at 23:11

## 2022-11-28 RX ADMIN — PREGABALIN 1000 MICROGRAM(S): 225 CAPSULE ORAL at 12:21

## 2022-11-28 RX ADMIN — Medication 1: at 12:22

## 2022-11-28 RX ADMIN — Medication 5 UNIT(S): at 12:23

## 2022-11-28 RX ADMIN — Medication 40 MILLIGRAM(S): at 05:51

## 2022-11-28 RX ADMIN — Medication 81 MILLIGRAM(S): at 12:21

## 2022-11-28 RX ADMIN — CLOPIDOGREL BISULFATE 75 MILLIGRAM(S): 75 TABLET, FILM COATED ORAL at 12:21

## 2022-11-28 RX ADMIN — TAMSULOSIN HYDROCHLORIDE 0.4 MILLIGRAM(S): 0.4 CAPSULE ORAL at 23:11

## 2022-11-28 RX ADMIN — SEVELAMER CARBONATE 800 MILLIGRAM(S): 2400 POWDER, FOR SUSPENSION ORAL at 17:15

## 2022-11-28 RX ADMIN — HEPARIN SODIUM 5000 UNIT(S): 5000 INJECTION INTRAVENOUS; SUBCUTANEOUS at 23:13

## 2022-11-28 RX ADMIN — Medication 30 MILLIGRAM(S): at 05:48

## 2022-11-28 RX ADMIN — CEFEPIME 100 MILLIGRAM(S): 1 INJECTION, POWDER, FOR SOLUTION INTRAMUSCULAR; INTRAVENOUS at 14:16

## 2022-11-28 RX ADMIN — Medication 5 MILLILITER(S): at 05:48

## 2022-11-28 RX ADMIN — Medication 5 MILLILITER(S): at 23:12

## 2022-11-28 RX ADMIN — Medication 12.5 MILLIGRAM(S): at 05:48

## 2022-11-28 RX ADMIN — INSULIN GLARGINE 13 UNIT(S): 100 INJECTION, SOLUTION SUBCUTANEOUS at 23:12

## 2022-11-28 RX ADMIN — SEVELAMER CARBONATE 800 MILLIGRAM(S): 2400 POWDER, FOR SUSPENSION ORAL at 12:22

## 2022-11-28 NOTE — PROGRESS NOTE ADULT - ASSESSMENT
63 yo male, h/o type 2 DM on insulin, CAD s.p stent 2008, s/p CABG 2012 (Dr Smith), PAD s/p angioplasty and stent b/l LE, BPH, ESRD on HD through left AVF (MWF follows with Dr MARY Paula), DFU sp left toe amputation, left calcaneal OM on daptomycin and cefepime post dialysis s.p wound Vac, chronic anemia, HFmrEF, presented to the hospital for chest pain. Found to have anemia s/p 1 unit of prbc.    ESRD on HD/ Anemia / Chest pain/ OM on abx  HD today 3h opti 160 UF 2.5 l  RAMSEY weekly post HD 60 mcg/ transfuse if Hb< 7  on sevelamer 800 mg po tid/ ph at goal / repeat please   on lasix non oliguric    BP controlled  abx for OM - Cefepime  s/p debridement with podiatry 11/21/cardiology f/up noted / OR rescheduled positive covid     will follow

## 2022-11-28 NOTE — PROGRESS NOTE ADULT - SUBJECTIVE AND OBJECTIVE BOX
24H events:    Patient is a 62y old Male who presents with a chief complaint of Chest pain (28 Nov 2022 15:48)  Primary diagnosis of Anemia  Today is hospital day 14d.    Patient seen and examined at bedside. Reports no active complaints.     PAST MEDICAL & SURGICAL HISTORY  CAD (coronary artery disease)  1 stent 2008    S/P CABG (coronary artery bypass graft)  x4    Diabetes mellitus    Transient ischemic attack (TIA)  2017; 2008    Chronic kidney disease (CKD)  Stage IV    Hypertension    Stented coronary artery  in 2008    Myocardial infarction  2012    PAD (peripheral artery disease)  S/p bypass left leg    HLD (hyperlipidemia)    BPH (benign prostatic hyperplasia)    Pain in left knee  s/p fall    OA (osteoarthritis)    [place] (hard of hearing)    Chronic anemia    S/P CABG (coronary artery bypass graft)  2012    H/O arterial bypass of lower limb  Left Lower Extremity (2016)    History of surgery  Left CEA (2017)  Left Pinkie toe Amputation (2014)  CABG x 4 (2012)  Card cath - stent (2008)      AV fistula  2019  LEFT AV FISTULA      SOCIAL HISTORY:  Negative for smoking/alcohol/drug use.     ALLERGIES:  No Known Allergies    MEDICATIONS:  STANDING MEDICATIONS  aspirin enteric coated 81 milliGRAM(s) Oral daily  atorvastatin 40 milliGRAM(s) Oral at bedtime  cefepime   IVPB 2000 milliGRAM(s) IV Intermittent <User Schedule>  clopidogrel Tablet 75 milliGRAM(s) Oral daily  cyanocobalamin 1000 MICROGram(s) Oral daily  darbepoetin Injectable Syringe 60 MICROGram(s) IV Push <User Schedule>  dextrose 5%. 1000 milliLiter(s) IV Continuous <Continuous>  dextrose 5%. 1000 milliLiter(s) IV Continuous <Continuous>  dextrose 50% Injectable 25 Gram(s) IV Push once  dextrose 50% Injectable 12.5 Gram(s) IV Push once  dextrose 50% Injectable 25 Gram(s) IV Push once  famotidine    Tablet 20 milliGRAM(s) Oral daily  folic acid 1 milliGRAM(s) Oral daily  furosemide    Tablet 40 milliGRAM(s) Oral daily  glucagon  Injectable 1 milliGRAM(s) IntraMuscular once  guaifenesin/dextromethorphan Oral Liquid 5 milliLiter(s) Oral three times a day  heparin   Injectable 5000 Unit(s) SubCutaneous every 8 hours  influenza   Vaccine 0.5 milliLiter(s) IntraMuscular once  insulin glargine Injectable (LANTUS) 13 Unit(s) SubCutaneous at bedtime  insulin lispro (ADMELOG) corrective regimen sliding scale   SubCutaneous three times a day before meals  insulin lispro Injectable (ADMELOG) 5 Unit(s) SubCutaneous before breakfast  insulin lispro Injectable (ADMELOG) 5 Unit(s) SubCutaneous before lunch  metoprolol tartrate 12.5 milliGRAM(s) Oral every 12 hours  NIFEdipine XL 30 milliGRAM(s) Oral daily  regadenoson Injectable 0.4 milliGRAM(s) IV Push once  remdesivir  IVPB   IV Intermittent   senna 2 Tablet(s) Oral at bedtime  sevelamer carbonate 800 milliGRAM(s) Oral three times a day with meals  tamsulosin 0.4 milliGRAM(s) Oral at bedtime    PRN MEDICATIONS  acetaminophen     Tablet .. 650 milliGRAM(s) Oral every 6 hours PRN  dextrose Oral Gel 15 Gram(s) Oral once PRN  melatonin 3 milliGRAM(s) Oral at bedtime PRN    VITALS:   T(F): 97.5  HR: 72  BP: 146/74  RR: 17  SpO2: --    LABS:                        7.2    6.19  )-----------( 170      ( 28 Nov 2022 05:20 )             22.3     11-28    133<L>  |  96<L>  |  69<HH>  ----------------------------<  85  5.5<H>   |  23  |  6.8<HH>    Ca    8.2<L>      28 Nov 2022 05:20  Mg     2.0     11-28                    RADIOLOGY:    PHYSICAL EXAM:  General: Awake, oriented and resting comfortably, no acute distress  Head: normocephalic, atraumatic  ENT:  moist mucous membranes  Cardiac: regular rate and rhythm, normal S1 and S2, no murmurs, rubs or gallops  Respiratory: clear to auscultation bilaterally, no wheezes, crackles or rhonchi, unlabored respirations  Abdomen: normoactive bowel sounds x4, non tender, nondistended  Ext:  No edema,   Neuro: A&O x3, no focal neurological deficits

## 2022-11-28 NOTE — PROGRESS NOTE ADULT - SUBJECTIVE AND OBJECTIVE BOX
Nephrology progress note  Patient is seen and examined, events over the last 24 h noted .  Lying in bed comfortable     Allergies:  No Known Allergies    Hospital Medications:     MEDICATIONS  (STANDING):  aspirin enteric coated 81 milliGRAM(s) Oral daily  atorvastatin 40 milliGRAM(s) Oral at bedtime  cefepime   IVPB 2000 milliGRAM(s) IV Intermittent <User Schedule>  clopidogrel Tablet 75 milliGRAM(s) Oral daily  cyanocobalamin 1000 MICROGram(s) Oral daily  darbepoetin Injectable Syringe 60 MICROGram(s) IV Push <User Schedule>  famotidine    Tablet 20 milliGRAM(s) Oral daily  folic acid 1 milliGRAM(s) Oral daily  furosemide    Tablet 40 milliGRAM(s) Oral daily  glucagon  Injectable 1 milliGRAM(s) IntraMuscular once  guaifenesin/dextromethorphan Oral Liquid 5 milliLiter(s) Oral three times a day  heparin   Injectable 5000 Unit(s) SubCutaneous every 8 hours  influenza   Vaccine 0.5 milliLiter(s) IntraMuscular once  insulin glargine Injectable (LANTUS) 13 Unit(s) SubCutaneous at bedtime  insulin lispro (ADMELOG) corrective regimen sliding scale   SubCutaneous three times a day before meals  insulin lispro Injectable (ADMELOG) 5 Unit(s) SubCutaneous before breakfast  insulin lispro Injectable (ADMELOG) 5 Unit(s) SubCutaneous before lunch  metoprolol tartrate 12.5 milliGRAM(s) Oral every 12 hours  NIFEdipine XL 30 milliGRAM(s) Oral daily  regadenoson Injectable 0.4 milliGRAM(s) IV Push once  senna 2 Tablet(s) Oral at bedtime  sevelamer carbonate 800 milliGRAM(s) Oral three times a day with meals  tamsulosin 0.4 milliGRAM(s) Oral at bedtime        VITALS:  T(F): 97.5 (11-28-22 @ 00:19), Max: 97.5 (11-28-22 @ 00:19)  HR: 88 (11-28-22 @ 00:19)  BP: 126/59 (11-28-22 @ 00:19)  RR: 18 (11-28-22 @ 00:19)  SpO2: 96% (11-27-22 @ 15:30)      11-27 @ 07:01  -  11-28 @ 07:00  --------------------------------------------------------  IN: 840 mL / OUT: 0 mL / NET: 840 mL          PHYSICAL EXAM:  Constitutional: NAD  Neck: No JVD  Respiratory: CTAB  Cardiovascular: S1, S2, RRR  Gastrointestinal: BS+, soft, NT/ND  Extremities:  No peripheral edema  :  No schneider.   Skin: No rashes    LABS:  11-27    135  |  96<L>  |  56<H>  ----------------------------<  85  5.2<H>   |  24  |  6.1<HH>    Ca    8.4      27 Nov 2022 08:51  Mg     2.0     11-28                            7.2    6.19  )-----------( 170      ( 28 Nov 2022 05:20 )             22.3       Urine Studies:        Iron 39, TIBC 133, %sat 29      [10-21-22 @ 10:30]  Ferritin 1126      [10-21-22 @ 10:30]  PTH -- (Ca 7.5)      [02-26-22 @ 16:00]   312  Vitamin D (25OH) 21      [02-26-22 @ 16:00]  HbA1c 5.8      [01-30-20 @ 06:46]  Lipid: chol 81, , HDL 22, LDL --      [10-15-22 @ 09:53]    HBsAb <3.0      [12-29-19 @ 17:44]  HBsAb Nonreact      [11-03-22 @ 06:40]  HBsAg Nonreact      [11-03-22 @ 06:40]  HBcAb Nonreact      [11-03-22 @ 06:40]  HCV 0.14, Nonreact      [10-27-22 @ 04:09]        RADIOLOGY & ADDITIONAL STUDIES:

## 2022-11-28 NOTE — PROGRESS NOTE ADULT - SUBJECTIVE AND OBJECTIVE BOX
SUBJECTIVE:    Patient is a 62y old Male who presents with a chief complaint of Chest pain (28 Nov 2022 08:42)    Currently admitted to medicine with the primary diagnosis of Anemia       Today is hospital day 14d.     PAST MEDICAL & SURGICAL HISTORY  CAD (coronary artery disease)  1 stent 2008    S/P CABG (coronary artery bypass graft)  x4    Diabetes mellitus    Transient ischemic attack (TIA)  2017; 2008    Chronic kidney disease (CKD)  Stage IV    Hypertension    Stented coronary artery  in 2008    Myocardial infarction  2012    PAD (peripheral artery disease)  S/p bypass left leg    HLD (hyperlipidemia)    BPH (benign prostatic hyperplasia)    Pain in left knee  s/p fall    OA (osteoarthritis)    [place] (hard of hearing)    Chronic anemia    S/P CABG (coronary artery bypass graft)  2012    H/O arterial bypass of lower limb  Left Lower Extremity (2016)    History of surgery  Left CEA (2017)  Left Pinkie toe Amputation (2014)  CABG x 4 (2012)  Card cath - stent (2008)      AV fistula  2019  LEFT AV FISTULA      ALLERGIES:  No Known Allergies    MEDICATIONS:  STANDING MEDICATIONS  aspirin enteric coated 81 milliGRAM(s) Oral daily  atorvastatin 40 milliGRAM(s) Oral at bedtime  cefepime   IVPB 2000 milliGRAM(s) IV Intermittent <User Schedule>  clopidogrel Tablet 75 milliGRAM(s) Oral daily  cyanocobalamin 1000 MICROGram(s) Oral daily  darbepoetin Injectable Syringe 60 MICROGram(s) IV Push <User Schedule>  dextrose 5%. 1000 milliLiter(s) IV Continuous <Continuous>  dextrose 5%. 1000 milliLiter(s) IV Continuous <Continuous>  dextrose 50% Injectable 25 Gram(s) IV Push once  dextrose 50% Injectable 12.5 Gram(s) IV Push once  dextrose 50% Injectable 25 Gram(s) IV Push once  famotidine    Tablet 20 milliGRAM(s) Oral daily  folic acid 1 milliGRAM(s) Oral daily  furosemide    Tablet 40 milliGRAM(s) Oral daily  glucagon  Injectable 1 milliGRAM(s) IntraMuscular once  guaifenesin/dextromethorphan Oral Liquid 5 milliLiter(s) Oral three times a day  heparin   Injectable 5000 Unit(s) SubCutaneous every 8 hours  influenza   Vaccine 0.5 milliLiter(s) IntraMuscular once  insulin glargine Injectable (LANTUS) 13 Unit(s) SubCutaneous at bedtime  insulin lispro (ADMELOG) corrective regimen sliding scale   SubCutaneous three times a day before meals  insulin lispro Injectable (ADMELOG) 5 Unit(s) SubCutaneous before breakfast  insulin lispro Injectable (ADMELOG) 5 Unit(s) SubCutaneous before lunch  metoprolol tartrate 12.5 milliGRAM(s) Oral every 12 hours  NIFEdipine XL 30 milliGRAM(s) Oral daily  regadenoson Injectable 0.4 milliGRAM(s) IV Push once  remdesivir  IVPB   IV Intermittent   senna 2 Tablet(s) Oral at bedtime  sevelamer carbonate 800 milliGRAM(s) Oral three times a day with meals  tamsulosin 0.4 milliGRAM(s) Oral at bedtime    PRN MEDICATIONS  acetaminophen     Tablet .. 650 milliGRAM(s) Oral every 6 hours PRN  dextrose Oral Gel 15 Gram(s) Oral once PRN  melatonin 3 milliGRAM(s) Oral at bedtime PRN    VITALS:   T(F): 97.5  HR: 72  BP: 146/74  RR: 17  SpO2: 96%    LABS:                        7.2    6.19  )-----------( 170      ( 28 Nov 2022 05:20 )             22.3     11-28    133<L>  |  96<L>  |  69<HH>  ----------------------------<  85  5.5<H>   |  23  |  6.8<HH>    Ca    8.2<L>      28 Nov 2022 05:20  Mg     2.0     11-28                    RADIOLOGY:    PHYSICAL EXAM:  GEN: No acute distress  LUNGS: Clear to auscultation bilaterally   HEART: S1/S2 present. RRR.   ABD/ GI: Soft, non-tender, non-distended. Bowel sounds present  EXT: NC/NC/NE/2+PP/MARQUES  NEURO: AAOX3     SUBJECTIVE:    Patient is a 62y old Male who presents with a chief complaint of Chest pain (28 Nov 2022 08:42)    Currently admitted to medicine with the primary diagnosis of Anemia       Today is hospital day 14d.     PAST MEDICAL & SURGICAL HISTORY  CAD (coronary artery disease)  1 stent 2008    S/P CABG (coronary artery bypass graft)  x4    Diabetes mellitus    Transient ischemic attack (TIA)  2017; 2008    Chronic kidney disease (CKD)  Stage IV    Hypertension    Stented coronary artery  in 2008    Myocardial infarction  2012    PAD (peripheral artery disease)  S/p bypass left leg    HLD (hyperlipidemia)    BPH (benign prostatic hyperplasia)    Pain in left knee  s/p fall    OA (osteoarthritis)    [place] (hard of hearing)    Chronic anemia    S/P CABG (coronary artery bypass graft)  2012    H/O arterial bypass of lower limb  Left Lower Extremity (2016)    History of surgery  Left CEA (2017)  Left Pinkie toe Amputation (2014)  CABG x 4 (2012)  Card cath - stent (2008)      AV fistula  2019  LEFT AV FISTULA      ALLERGIES:  No Known Allergies    MEDICATIONS:  STANDING MEDICATIONS  aspirin enteric coated 81 milliGRAM(s) Oral daily  atorvastatin 40 milliGRAM(s) Oral at bedtime  cefepime   IVPB 2000 milliGRAM(s) IV Intermittent <User Schedule>  clopidogrel Tablet 75 milliGRAM(s) Oral daily  cyanocobalamin 1000 MICROGram(s) Oral daily  darbepoetin Injectable Syringe 60 MICROGram(s) IV Push <User Schedule>  dextrose 5%. 1000 milliLiter(s) IV Continuous <Continuous>  dextrose 5%. 1000 milliLiter(s) IV Continuous <Continuous>  dextrose 50% Injectable 25 Gram(s) IV Push once  dextrose 50% Injectable 12.5 Gram(s) IV Push once  dextrose 50% Injectable 25 Gram(s) IV Push once  famotidine    Tablet 20 milliGRAM(s) Oral daily  folic acid 1 milliGRAM(s) Oral daily  furosemide    Tablet 40 milliGRAM(s) Oral daily  glucagon  Injectable 1 milliGRAM(s) IntraMuscular once  guaifenesin/dextromethorphan Oral Liquid 5 milliLiter(s) Oral three times a day  heparin   Injectable 5000 Unit(s) SubCutaneous every 8 hours  influenza   Vaccine 0.5 milliLiter(s) IntraMuscular once  insulin glargine Injectable (LANTUS) 13 Unit(s) SubCutaneous at bedtime  insulin lispro (ADMELOG) corrective regimen sliding scale   SubCutaneous three times a day before meals  insulin lispro Injectable (ADMELOG) 5 Unit(s) SubCutaneous before breakfast  insulin lispro Injectable (ADMELOG) 5 Unit(s) SubCutaneous before lunch  metoprolol tartrate 12.5 milliGRAM(s) Oral every 12 hours  NIFEdipine XL 30 milliGRAM(s) Oral daily  regadenoson Injectable 0.4 milliGRAM(s) IV Push once  remdesivir  IVPB   IV Intermittent   senna 2 Tablet(s) Oral at bedtime  sevelamer carbonate 800 milliGRAM(s) Oral three times a day with meals  tamsulosin 0.4 milliGRAM(s) Oral at bedtime    PRN MEDICATIONS  acetaminophen     Tablet .. 650 milliGRAM(s) Oral every 6 hours PRN  dextrose Oral Gel 15 Gram(s) Oral once PRN  melatonin 3 milliGRAM(s) Oral at bedtime PRN    VITALS:   T(F): 97.5  HR: 72  BP: 146/74  RR: 17  SpO2: 96%    LABS:                        7.2    6.19  )-----------( 170      ( 28 Nov 2022 05:20 )             22.3     11-28    133<L>  |  96<L>  |  69<HH>  ----------------------------<  85  5.5<H>   |  23  |  6.8<HH>    Ca    8.2<L>      28 Nov 2022 05:20  Mg     2.0     11-28                    RADIOLOGY:    PHYSICAL EXAM:  GEN: No acute distress  LUNGS: Clear to auscultation bilaterally   HEART: S1/S2 present. RRR.   ABD/ GI: Soft, non-tender, non-distended. Bowel sounds present  EXT: lt calcaneal wound  NEURO: AAOX3

## 2022-11-28 NOTE — PROGRESS NOTE ADULT - SUBJECTIVE AND OBJECTIVE BOX
Podiatry Progress Note    Subjective:  SCHWABACHER, LAWRENCE is a  62y Male.   Seen bedside.   Patient is a 62y old  Male who presents with a chief complaint of Chest pain (28 Nov 2022 15:25)      Past Medical History and Surgical History  PAST MEDICAL & SURGICAL HISTORY:  CAD (coronary artery disease)  1 stent 2008      S/P CABG (coronary artery bypass graft)  x4      Diabetes mellitus      Transient ischemic attack (TIA)  2017; 2008      Chronic kidney disease (CKD)  Stage IV      Hypertension      Stented coronary artery  in 2008      Myocardial infarction  2012      PAD (peripheral artery disease)  S/p bypass left leg      HLD (hyperlipidemia)      BPH (benign prostatic hyperplasia)      Pain in left knee  s/p fall      OA (osteoarthritis)      [place] (hard of hearing)      Chronic anemia      S/P CABG (coronary artery bypass graft)  2012      H/O arterial bypass of lower limb  Left Lower Extremity (2016)      History of surgery  Left CEA (2017)  Left Pinkie toe Amputation (2014)  CABG x 4 (2012)  Card cath - stent (2008)        AV fistula  2019  LEFT AV FISTULA           Objective:  Vital Signs Last 24 Hrs  T(C): 36.4 (28 Nov 2022 00:19), Max: 36.4 (28 Nov 2022 00:19)  T(F): 97.5 (28 Nov 2022 00:19), Max: 97.5 (28 Nov 2022 00:19)  HR: 72 (28 Nov 2022 11:45) (72 - 88)  BP: 146/74 (28 Nov 2022 11:45) (126/59 - 146/74)  BP(mean): --  RR: 17 (28 Nov 2022 11:45) (17 - 18)  SpO2: --    Parameters below as of 28 Nov 2022 11:45  Patient On (Oxygen Delivery Method): room air                            7.2    6.19  )-----------( 170      ( 28 Nov 2022 05:20 )             22.3                 11-28    133<L>  |  96<L>  |  69<HH>  ----------------------------<  85  5.5<H>   |  23  |  6.8<HH>    Ca    8.2<L>      28 Nov 2022 05:20  Mg     2.0     11-28          Physical Exam - Lower Extremity Focused:   Derm: Open Left Plantar Ulcer @ Plantar Medial Rearfoot;    -Wound Base Fibrogranular; 50% Fibrous, 50% Granular; moderate serosanguinous drainage    -Wound Margins Irregular; mild macerated borders    - measures 7.0 x 5.5 x 2.4cm;   Mild Cellulitis w/ Erythema of Left Lower Extremity; improved since previous visit on 11/7/22;  Noted mild decrease in periwound erythema  Vascular: DP and PT Pulses Diminished; Foot is Warm to Warm to the touch; Capillary Refill Time < 3 Seconds;    Neuro: Protective Sensation Diminished / Moderately Neuropathic   MSK: No Pain On Palpation at Wound Site     Assessment:  - S/P Excisional Debridement of Soft Tissue & Bone, Left Foot; (DOS: 10/21/22)   - S/P exc dbx st/b Left foot 10/24/22      Plan:  Chart reviewed and Patient evaluated. All Questions and Concerns Addressed and Answered  Local Wound Care; Wound Flushed w/ NS; Wound Packed w/ WTD / ABD / Kerlix / ACE  Weight Bearing Status; WBAT with forefoot touch  Continue w/ Local Wound Care; Q24 Dressing Changes;  Surgical debridement tomorrow, 11/29  Please make the pt NPO 11/28 by MN, request T&S, Coag studies, optimize lab values prior to OR  Pt last Hgb was 7.2, please make arrangement for blood transfusion in case of bleeding.     Discussed Plan w/ Attending; Dr. Zarate    Podiatry

## 2022-11-28 NOTE — PROGRESS NOTE ADULT - ASSESSMENT
63 yo male, h/o type 2 DM on insulin, CAD s.p stent 2008, s/p CABG 2012 (Dr Smith), PAD s/p angioplasty and stent b/l LE, BPH, ESRD on HD through left AVF (MWF follows with Dr MARY Paula), DFU sp left toe amputation, left calcaneal OM on daptomycin and cefepime post dialysis s.p wound Vac, chronic anemia, HFmrEF, presented to the hospital for chest pain     1.Recent hx Left calcaneal OM   - Wound Cx 9/16 - Enterobacter cloacae  - wound CX 10/16 VRE Faecium Enterobacter cloacae complex, Proteus vulgaris, Morganella morganii   - s/p debridement 10/21 Wound Cx Proteus mirabilis, VRE faecium, Pseudomonas putida  - s/p debridement 10/24 Crumble L calcaneous   - s/p debridement 11/3 with wound vac in place  - Cefepime 2g post HD through 11/30; s/p 3 session of daptomycin post-HD  - On last admission, ID plan for 4 weeks from last debridement (11/3-11/30)  -Debridement  for Friday 11/25/2022 - again cancelled by podiatry now planned for 11/29     2. NSVT  -10 beats of NSVT observed on tele on 11/16 PM  -Started Metoprolol 12.5 BID  -Maintain K>4 and Mg>2    3. Anemia of chronic disease s/p 1 PRBC   - Monitor CBC, transfuse to keep >8-- HB is low today 7.2 --transfuse as needed maybe AM    4.  ESRD on HD   - HD per nephrology  - B/L pitting edema, little urine output    5. Chest pain. hx of CAD s/p PCI/CABG and PAD s/p angioplasty  ACS/CAD presenting with Chest pain  -  on aspirin and plavix   - statin 40mg HS   - Troponin stable between 0.7-0.8  NST11/18/22: FIXED PERFUSION DEFECT INVOLVING ANTERIOR WALL OF THE LEFT VENTRICLE EXTENDING TO APEX CONSISTENT WITH SCAR. GLOBAL HYPOKINESIS WITH POOR THICKENING OF THE ANTERIOR WALL. EF  36 %   < from: TTE Echo Complete w/ Contrast w/ Doppler (11.18.22 @ 08:35) >  Mildly decreased global left ventricular systolic function with mild   concentric hypertrophy and normal internal cavity size.   2. With echocontrast, a moderate sized apical aneurysm is noted. There   is no evidence of LV thrombus.   3. Left ventricular ejection fraction, by visual estimation, is 45 to   50%.   4. Grade III diastolic dysfunction with elevated left atrial and left   ventricular end-diastolic pressures.    6.  PAD  - s/p angiogram LLE with peroneal artery angioplasty and arterectomy 10/27  - Cont meds as above    7. Type 2 DM  - Adjusted insulin due to hypoglycemia in am  - Lantus was decreased from 20u to 13unit     8.  BPH  - c/w Tamsulosin 0.4 mg daily  9. Covid positive--coughing today but remains on room air- will do 3 day treatment with RDV-- repeat covid test still positive. check CXR    Pending: Podiatry procedure  -DVT prophylaxis: Heparin  -GI prophylaxis: protonix  -Diet: Renal, 1.2l fluid restriction  -Code status: Full code    foot debridement  rescheduled for tuesday now 11/29         63 yo male, h/o type 2 DM on insulin, CAD s.p stent 2008, s/p CABG 2012 (Dr Smith), PAD s/p angioplasty and stent b/l LE, BPH, ESRD on HD through left AVF (MWF follows with Dr MARY Paula), DFU sp left toe amputation, left calcaneal OM on daptomycin and cefepime post dialysis s.p wound Vac, chronic anemia, HFmrEF, presented to the hospital for chest pain     1.Recent hx Left calcaneal OM   - Wound Cx 9/16 - Enterobacter cloacae  - wound CX 10/16 VRE Faecium Enterobacter cloacae complex, Proteus vulgaris, Morganella morganii   - s/p debridement 10/21 Wound Cx Proteus mirabilis, VRE faecium, Pseudomonas putida  - s/p debridement 10/24 Crumble L calcaneous   - s/p debridement 11/3 with wound vac in place  - Cefepime 2g post HD through 11/30; s/p 3 session of daptomycin post-HD  - On last admission, ID plan for 4 weeks from last debridement (11/3-11/30)  -Debridement  for Friday 11/25/2022 - again cancelled by podiatry now planned for 11/29     2. NSVT  -10 beats of NSVT observed on tele on 11/16 PM  -Started Metoprolol 12.5 BID  -Maintain K>4 and Mg>2    3. Anemia of chronic disease s/p 1 PRBC   - Monitor CBC, transfuse to keep >8-- HB is low today 7.2 --transfuse  1unit Today    4.  ESRD on HD   - HD per nephrology  - B/L pitting edema, little urine output    5. Chest pain. hx of CAD s/p PCI/CABG and PAD s/p angioplasty  ACS/CAD presenting with Chest pain  -  on aspirin and plavix   - statin 40mg HS   - Troponin stable between 0.7-0.8  NST11/18/22: FIXED PERFUSION DEFECT INVOLVING ANTERIOR WALL OF THE LEFT VENTRICLE EXTENDING TO APEX CONSISTENT WITH SCAR. GLOBAL HYPOKINESIS WITH POOR THICKENING OF THE ANTERIOR WALL. EF  36 %   < from: TTE Echo Complete w/ Contrast w/ Doppler (11.18.22 @ 08:35) >  Mildly decreased global left ventricular systolic function with mild   concentric hypertrophy and normal internal cavity size.   2. With echocontrast, a moderate sized apical aneurysm is noted. There   is no evidence of LV thrombus.   3. Left ventricular ejection fraction, by visual estimation, is 45 to   50%.   4. Grade III diastolic dysfunction with elevated left atrial and left   ventricular end-diastolic pressures.    6.  PAD  - s/p angiogram LLE with peroneal artery angioplasty and arterectomy 10/27  - Cont meds as above    7. Type 2 DM  - Adjusted insulin due to hypoglycemia in am  - Lantus was decreased from 20u to 13unit     8.  BPH  - c/w Tamsulosin 0.4 mg daily  9. Covid positive--coughing today but remains on room air- will do 3 day treatment with RDV-- repeat covid test still positive. check CXR    Pending: Podiatry procedure  -DVT prophylaxis: Heparin  -GI prophylaxis: protonix  -Diet: Renal, 1.2l fluid restriction  -Code status: Full code    foot debridement  rescheduled for tuesday now 11/29

## 2022-11-28 NOTE — PROGRESS NOTE ADULT - ASSESSMENT
63 yo male, h/o type 2 DM on insulin, CAD s.p stent 2008, s/p CABG 2012 (Dr Smith), PAD s/p angioplasty and stent b/l LE, BPH, ESRD on HD through left AVF (MWF follows with Dr MARY Paula), DFU sp left toe amputation, left calcaneal OM on daptomycin and cefepime post dialysis s.p wound Vac, chronic anemia, HFmrEF, presented to the hospital for chest pain.     #ACS/CAD patient presenting with Chest pain-resolved   #CAD s/p PCI/CABG  #PAD s/p angioplasty  #HTN/HLD  #Chronic HFmrEF  - Troponin 0.70 in setting of ESRD, 0.25 back in 09/2022  - Troponin stable between 0.7-0.8  - TTE LVEF 45-50%, Grade 3 diastolic dysfunction  - Nuclear stress test: FIXED PERFUSION DEFECT INVOLVING ANTERIOR WALL OF THE LEFT VENTRICLE EXTENDING TO APEX CONSISTENT WITH SCAR. GLOBAL HYPOKINESIS WITH POOR THICKENING OF THE ANTERIOR WALL. EF  36 %   - c/w ASA 81 mg daily  - c/w Plavix 75 mg daily  - c/w atorvastatin 40 mg daily  - c/w Nifedipine 30 mg daily  - EKG: Left anterior fascicular block ST & T wave abnormality, consider lateral ischemia. Prolonged QT    #Recent hx Left calcaneal OM   - Wound Cx 9/16 - Enterobacter cloacae  - wound CX 10/16 VRE Faecium Enterobacter cloacae complex, Proteus vulgaris, Morganella morganii   - s/p debridement 10/21 Wound Cx Proteus mirabilis, VRE faecium, Pseudomonas putida  - s/p debridement 10/24 Crumble L calcaneous   - s/p debridement 11/3 with wound vac in place  - Cefepime 2g post HD through 11/30; s/p 3 session of daptomycin post-HD  - On last admission, ID plan for 4 weeks from last debridement (11/3-11/30)  -Debridement 11/21 PM cancelled  -Rescheduled debridement for 11/25  -Debridement cancelled again-rescheduled for 11/29/30  -pt requested PT until surgery    #Covid+  -pt tested + for covid on 11/26   -started on remdesavir  -fu chest xray  -isolation protocol    #NSVT  -11/16 PM 10 beats of NSVT observed on tele   -Started Metoprolol 12.5 BID  -Maintain K>4 and Mg>2    # Anemia of chronic disease  - hb on admission 6.6 baseline 7.6. s/p 1U PRBC  -11/21 pt got 1 unit pRBCs   - Monitor CBC, transfuse to keep >8  -maintain active T&S     # ESRD on HD   - HD per nephrology  - B/L pitting edema, little urine output       # PAD  - s/p angiogram LLE with peroneal artery angioplasty and arterectomy 10/27  - Cont meds as above    # Type 2 DM  -Continue insulin regimen    # BPH  - c/w Tamsulosin 0.4 mg daily      -DVT prophylaxis: Heparin  -GI prophylaxis: Not indicated  -Diet: Renal, 1.2l fluid restriction  -Code status: Full code      Pending: Podiatry procedure 11/29/30

## 2022-11-29 ENCOUNTER — RESULT REVIEW (OUTPATIENT)
Age: 62
End: 2022-11-29

## 2022-11-29 LAB
ALBUMIN SERPL ELPH-MCNC: 3.2 G/DL — LOW (ref 3.5–5.2)
ALBUMIN SERPL ELPH-MCNC: 3.4 G/DL — LOW (ref 3.5–5.2)
ALP SERPL-CCNC: 154 U/L — HIGH (ref 30–115)
ALP SERPL-CCNC: 158 U/L — HIGH (ref 30–115)
ALT FLD-CCNC: <5 U/L — SIGNIFICANT CHANGE UP (ref 0–41)
ALT FLD-CCNC: <5 U/L — SIGNIFICANT CHANGE UP (ref 0–41)
AMMONIA BLD-MCNC: 12 UMOL/L — SIGNIFICANT CHANGE UP (ref 11–55)
ANION GAP SERPL CALC-SCNC: 12 MMOL/L — SIGNIFICANT CHANGE UP (ref 7–14)
APTT BLD: 32.4 SEC — SIGNIFICANT CHANGE UP (ref 27–39.2)
AST SERPL-CCNC: 5 U/L — SIGNIFICANT CHANGE UP (ref 0–41)
AST SERPL-CCNC: 7 U/L — SIGNIFICANT CHANGE UP (ref 0–41)
BASOPHILS # BLD AUTO: 0.06 K/UL — SIGNIFICANT CHANGE UP (ref 0–0.2)
BASOPHILS NFR BLD AUTO: 1.4 % — HIGH (ref 0–1)
BILIRUB DIRECT SERPL-MCNC: <0.2 MG/DL — SIGNIFICANT CHANGE UP (ref 0–0.3)
BILIRUB INDIRECT FLD-MCNC: >0 MG/DL — LOW (ref 0.2–1.2)
BILIRUB SERPL-MCNC: 0.2 MG/DL — SIGNIFICANT CHANGE UP (ref 0.2–1.2)
BILIRUB SERPL-MCNC: 0.2 MG/DL — SIGNIFICANT CHANGE UP (ref 0.2–1.2)
BLD GP AB SCN SERPL QL: SIGNIFICANT CHANGE UP
BUN SERPL-MCNC: 43 MG/DL — HIGH (ref 10–20)
CALCIUM SERPL-MCNC: 8.3 MG/DL — LOW (ref 8.4–10.5)
CHLORIDE SERPL-SCNC: 98 MMOL/L — SIGNIFICANT CHANGE UP (ref 98–110)
CO2 SERPL-SCNC: 28 MMOL/L — SIGNIFICANT CHANGE UP (ref 17–32)
CREAT SERPL-MCNC: 5 MG/DL — CRITICAL HIGH (ref 0.7–1.5)
EGFR: 12 ML/MIN/1.73M2 — LOW
EOSINOPHIL # BLD AUTO: 0.2 K/UL — SIGNIFICANT CHANGE UP (ref 0–0.7)
EOSINOPHIL NFR BLD AUTO: 4.7 % — SIGNIFICANT CHANGE UP (ref 0–8)
GLUCOSE BLDC GLUCOMTR-MCNC: 77 MG/DL — SIGNIFICANT CHANGE UP (ref 70–99)
GLUCOSE BLDC GLUCOMTR-MCNC: 82 MG/DL — SIGNIFICANT CHANGE UP (ref 70–99)
GLUCOSE BLDC GLUCOMTR-MCNC: 82 MG/DL — SIGNIFICANT CHANGE UP (ref 70–99)
GLUCOSE BLDC GLUCOMTR-MCNC: 86 MG/DL — SIGNIFICANT CHANGE UP (ref 70–99)
GLUCOSE BLDC GLUCOMTR-MCNC: 91 MG/DL — SIGNIFICANT CHANGE UP (ref 70–99)
GLUCOSE SERPL-MCNC: 73 MG/DL — SIGNIFICANT CHANGE UP (ref 70–99)
HCT VFR BLD CALC: 22.9 % — LOW (ref 42–52)
HGB BLD-MCNC: 7.4 G/DL — LOW (ref 14–18)
IMM GRANULOCYTES NFR BLD AUTO: 0.2 % — SIGNIFICANT CHANGE UP (ref 0.1–0.3)
INR BLD: 1.17 RATIO — SIGNIFICANT CHANGE UP (ref 0.65–1.3)
LYMPHOCYTES # BLD AUTO: 0.56 K/UL — LOW (ref 1.2–3.4)
LYMPHOCYTES # BLD AUTO: 13.2 % — LOW (ref 20.5–51.1)
MAGNESIUM SERPL-MCNC: 2 MG/DL — SIGNIFICANT CHANGE UP (ref 1.8–2.4)
MCHC RBC-ENTMCNC: 29.6 PG — SIGNIFICANT CHANGE UP (ref 27–31)
MCHC RBC-ENTMCNC: 32.3 G/DL — SIGNIFICANT CHANGE UP (ref 32–37)
MCV RBC AUTO: 91.6 FL — SIGNIFICANT CHANGE UP (ref 80–94)
MONOCYTES # BLD AUTO: 0.44 K/UL — SIGNIFICANT CHANGE UP (ref 0.1–0.6)
MONOCYTES NFR BLD AUTO: 10.4 % — HIGH (ref 1.7–9.3)
NEUTROPHILS # BLD AUTO: 2.97 K/UL — SIGNIFICANT CHANGE UP (ref 1.4–6.5)
NEUTROPHILS NFR BLD AUTO: 70.1 % — SIGNIFICANT CHANGE UP (ref 42.2–75.2)
NRBC # BLD: 0 /100 WBCS — SIGNIFICANT CHANGE UP (ref 0–0)
PLATELET # BLD AUTO: 168 K/UL — SIGNIFICANT CHANGE UP (ref 130–400)
POTASSIUM SERPL-MCNC: 4.7 MMOL/L — SIGNIFICANT CHANGE UP (ref 3.5–5)
POTASSIUM SERPL-SCNC: 4.7 MMOL/L — SIGNIFICANT CHANGE UP (ref 3.5–5)
PROT SERPL-MCNC: 7.1 G/DL — SIGNIFICANT CHANGE UP (ref 6–8)
PROT SERPL-MCNC: 7.2 G/DL — SIGNIFICANT CHANGE UP (ref 6–8)
PROTHROM AB SERPL-ACNC: 13.4 SEC — HIGH (ref 9.95–12.87)
RBC # BLD: 2.5 M/UL — LOW (ref 4.7–6.1)
RBC # FLD: 15.9 % — HIGH (ref 11.5–14.5)
SODIUM SERPL-SCNC: 138 MMOL/L — SIGNIFICANT CHANGE UP (ref 135–146)
WBC # BLD: 4.24 K/UL — LOW (ref 4.8–10.8)
WBC # FLD AUTO: 4.24 K/UL — LOW (ref 4.8–10.8)

## 2022-11-29 PROCEDURE — 20245 BONE BIOPSY OPEN DEEP: CPT | Mod: GC,LT,59

## 2022-11-29 PROCEDURE — 99233 SBSQ HOSP IP/OBS HIGH 50: CPT

## 2022-11-29 PROCEDURE — 28120 PART REMOVAL OF ANKLE/HEEL: CPT | Mod: GC,LT

## 2022-11-29 PROCEDURE — 88311 DECALCIFY TISSUE: CPT | Mod: 26

## 2022-11-29 PROCEDURE — 29581 APPL MULTLAYER CMPRN SYS LEG: CPT | Mod: LT

## 2022-11-29 PROCEDURE — 11046 DBRDMT MUSC&/FSCA EA ADDL: CPT | Mod: GC,59

## 2022-11-29 PROCEDURE — 88304 TISSUE EXAM BY PATHOLOGIST: CPT | Mod: 26

## 2022-11-29 PROCEDURE — 11043 DBRDMT MUSC&/FSCA 1ST 20/<: CPT | Mod: GC,59

## 2022-11-29 PROCEDURE — 73630 X-RAY EXAM OF FOOT: CPT | Mod: 26,LT

## 2022-11-29 RX ORDER — INSULIN GLARGINE 100 [IU]/ML
7 INJECTION, SOLUTION SUBCUTANEOUS AT BEDTIME
Refills: 0 | Status: DISCONTINUED | OUTPATIENT
Start: 2022-11-29 | End: 2022-12-01

## 2022-11-29 RX ORDER — ONDANSETRON 8 MG/1
4 TABLET, FILM COATED ORAL ONCE
Refills: 0 | Status: DISCONTINUED | OUTPATIENT
Start: 2022-11-29 | End: 2022-11-29

## 2022-11-29 RX ORDER — SODIUM CHLORIDE 9 MG/ML
1000 INJECTION, SOLUTION INTRAVENOUS
Refills: 0 | Status: DISCONTINUED | OUTPATIENT
Start: 2022-11-29 | End: 2022-11-29

## 2022-11-29 RX ORDER — DEXTROSE 50 % IN WATER 50 %
12.5 SYRINGE (ML) INTRAVENOUS ONCE
Refills: 0 | Status: COMPLETED | OUTPATIENT
Start: 2022-11-29 | End: 2022-11-29

## 2022-11-29 RX ORDER — HYDROMORPHONE HYDROCHLORIDE 2 MG/ML
0.5 INJECTION INTRAMUSCULAR; INTRAVENOUS; SUBCUTANEOUS
Refills: 0 | Status: DISCONTINUED | OUTPATIENT
Start: 2022-11-29 | End: 2022-11-29

## 2022-11-29 RX ADMIN — Medication 81 MILLIGRAM(S): at 21:27

## 2022-11-29 RX ADMIN — Medication 5 MILLILITER(S): at 14:43

## 2022-11-29 RX ADMIN — FAMOTIDINE 20 MILLIGRAM(S): 10 INJECTION INTRAVENOUS at 11:27

## 2022-11-29 RX ADMIN — Medication 12.5 GRAM(S): at 11:26

## 2022-11-29 RX ADMIN — HEPARIN SODIUM 5000 UNIT(S): 5000 INJECTION INTRAVENOUS; SUBCUTANEOUS at 06:13

## 2022-11-29 RX ADMIN — Medication 5 MILLILITER(S): at 21:28

## 2022-11-29 RX ADMIN — PREGABALIN 1000 MICROGRAM(S): 225 CAPSULE ORAL at 11:28

## 2022-11-29 RX ADMIN — CLOPIDOGREL BISULFATE 75 MILLIGRAM(S): 75 TABLET, FILM COATED ORAL at 21:27

## 2022-11-29 RX ADMIN — INSULIN GLARGINE 7 UNIT(S): 100 INJECTION, SOLUTION SUBCUTANEOUS at 21:42

## 2022-11-29 RX ADMIN — REMDESIVIR 200 MILLIGRAM(S): 5 INJECTION INTRAVENOUS at 00:58

## 2022-11-29 RX ADMIN — Medication 30 MILLIGRAM(S): at 06:12

## 2022-11-29 RX ADMIN — SENNA PLUS 2 TABLET(S): 8.6 TABLET ORAL at 21:28

## 2022-11-29 RX ADMIN — Medication 5 MILLILITER(S): at 06:12

## 2022-11-29 RX ADMIN — Medication 40 MILLIGRAM(S): at 06:12

## 2022-11-29 RX ADMIN — Medication 1 MILLIGRAM(S): at 11:27

## 2022-11-29 RX ADMIN — Medication 12.5 MILLIGRAM(S): at 06:12

## 2022-11-29 RX ADMIN — ATORVASTATIN CALCIUM 40 MILLIGRAM(S): 80 TABLET, FILM COATED ORAL at 21:27

## 2022-11-29 RX ADMIN — Medication 12.5 MILLIGRAM(S): at 21:27

## 2022-11-29 RX ADMIN — TAMSULOSIN HYDROCHLORIDE 0.4 MILLIGRAM(S): 0.4 CAPSULE ORAL at 21:28

## 2022-11-29 RX ADMIN — REMDESIVIR 500 MILLIGRAM(S): 5 INJECTION INTRAVENOUS at 22:14

## 2022-11-29 NOTE — CHART NOTE - NSCHARTNOTEFT_GEN_A_CORE
Registered Dietitian Follow-Up     Patient Profile Reviewed                           Yes [x]   No []     Nutrition History Previously Obtained        Yes [x]  No []       Pertinent Subjective Information: pt is NPO this morning due to procedure. Pt reports having a fair appetite and poor intake due to disliking hospital food as well as not having an optimum appetite. Discussed nutrition supplements upon resuming po diet order -- agreeable to add.      Pertinent Medical Interventions:  #ACS/CAD patient presenting with Chest pain-resolved  #Chronic HFmrEF  # ESRD on HD   #Pending: Podiatry procedure 30     Diet order: Diet, NPO after Midnight:      NPO Start Date: 2022,   NPO Start Time: 23:59 (22 @ 15:47) [Active]  Diet, Renal Restrictions:   For patients receiving Renal Replacement - No Protein Restr, No Conc K, No Conc Phos, Low Sodium  Consistent Carbohydrate {No Snacks}  1200mL Fluid Restriction ([id number]) (22 @ 08:09) [Active]    Anthropometrics:  Height (cm): 180.3 (22 @ 06:55)  Weight (kg): 90.8 (22 @ 06:55)  BMI (kg/m2): 27.9 (22 @ 06:55)  IBW: 78.1kg     Daily Weight in k.7 () -- Mild fluctuations in weight likely related to fluid shifts 2/2 HD    MEDICATIONS  (STANDING):  aspirin enteric coated 81 milliGRAM(s) Oral daily  atorvastatin 40 milliGRAM(s) Oral at bedtime  cefepime   IVPB 2000 milliGRAM(s) IV Intermittent <User Schedule>  clopidogrel Tablet 75 milliGRAM(s) Oral daily  cyanocobalamin 1000 MICROGram(s) Oral daily  darbepoetin Injectable Syringe 60 MICROGram(s) IV Push <User Schedule>  dextrose 5%. 1000 milliLiter(s) (50 mL/Hr) IV Continuous <Continuous>  dextrose 5%. 1000 milliLiter(s) (100 mL/Hr) IV Continuous <Continuous>  dextrose 50% Injectable 25 Gram(s) IV Push once  dextrose 50% Injectable 12.5 Gram(s) IV Push once  dextrose 50% Injectable 25 Gram(s) IV Push once  famotidine    Tablet 20 milliGRAM(s) Oral daily  folic acid 1 milliGRAM(s) Oral daily  furosemide    Tablet 40 milliGRAM(s) Oral daily  glucagon  Injectable 1 milliGRAM(s) IntraMuscular once  guaifenesin/dextromethorphan Oral Liquid 5 milliLiter(s) Oral three times a day  heparin   Injectable 5000 Unit(s) SubCutaneous every 8 hours  insulin glargine Injectable (LANTUS) 13 Unit(s) SubCutaneous at bedtime  insulin lispro (ADMELOG) corrective regimen sliding scale   SubCutaneous three times a day before meals  insulin lispro Injectable (ADMELOG) 5 Unit(s) SubCutaneous before breakfast  insulin lispro Injectable (ADMELOG) 5 Unit(s) SubCutaneous before lunch  metoprolol tartrate 12.5 milliGRAM(s) Oral every 12 hours  NIFEdipine XL 30 milliGRAM(s) Oral daily  regadenoson Injectable 0.4 milliGRAM(s) IV Push once  remdesivir  IVPB   IV Intermittent   remdesivir  IVPB 100 milliGRAM(s) IV Intermittent every 24 hours  senna 2 Tablet(s) Oral at bedtime  sevelamer carbonate 800 milliGRAM(s) Oral three times a day with meals  tamsulosin 0.4 milliGRAM(s) Oral at bedtime    MEDICATIONS  (PRN):  dextrose Oral Gel 15 Gram(s) Oral once PRN Blood Glucose LESS THAN 70 milliGRAM(s)/deciliter    Pertinent Labs:  @ 06:56: Na 138, BUN 43<H>, Cr 5.0<HH>, BG 73, K+ 4.7, Phos --, Mg 2.0, Alk Phos 154<H>, ALT/SGPT <5, AST/SGOT 7, HbA1c --   @ 19:23: Na --, BUN --, Cr 4.0<H>, BG --, K+ --, Phos --, Mg --, Alk Phos 175<H>, ALT/SGPT <5, AST/SGOT 7, HbA1c --    Finger Sticks:  POCT Blood Glucose.: 77 mg/dL ( @ 11:03)  POCT Blood Glucose.: 82 mg/dL ( @ 08:15)  POCT Blood Glucose.: 169 mg/dL ( @ 23:10)  POCT Blood Glucose.: 192 mg/dL ( @ 21:00)  POCT Blood Glucose.: 139 mg/dL ( @ 16:31)    Physical Findings:  - Appearance: WD; no edema   - GI function: WDL, last bowel movement    - Tubes: n/a   - Oral/Mouth cavity: none   - Skin: bruised      Nutrition Requirements: per prev RD assessment   Weight Used: 90.8 kg ABW     Estimated Energy Needs    Continue [x]  Adjust []  1724 - 2241 kcal/day (1.0 - 1.3 AF -- due to overwt BMI vs. HD)    Estimated Protein Needs    Continue [x]  Adjust []  109 - 118 gm/day (1.2 - 1.3 gm/kg) - same reason as above)     Estimated Fluid Needs        Continue [x]  Adjust []  2274 mL/day (25 mL/kcal) - with consideration for HD     Nutrient Intake: -: %      [x] Previous Nutrition Diagnosis: Increase Nutrient Needs            [x] Ongoing          [] Resolved     Nutrition Intervention: meals and snacks, medical food supplements     Goal/Expected Outcome: Pt to consume and tolerate >75% of meals/snacks and PO supplements in 6 days.      Nutrition Monitoring: diet order,energy intake,body composition,NFPF, renal/electrolyte profile     Recommendation:   1. add Nepro daily (425kcal/19g PRO)   2. Cont w/ current diet order   3. encourage and assist w/ PO intake   4. obtain daily wts     Aslam via Teams   RD remains available: Micheline Gatica, ERNESTON [id number]

## 2022-11-29 NOTE — PROGRESS NOTE ADULT - SUBJECTIVE AND OBJECTIVE BOX
24H events:    Patient is a 62y old Male who presents with a chief complaint of Chest pain (29 Nov 2022 12:58)  Primary diagnosis of Anemia  Today is hospital day 15d.    Patient seen and examined at bedside. Reports no active complaints.     PAST MEDICAL & SURGICAL HISTORY  CAD (coronary artery disease)  1 stent 2008    S/P CABG (coronary artery bypass graft)  x4    Diabetes mellitus    Transient ischemic attack (TIA)  2017; 2008    Chronic kidney disease (CKD)  Stage IV    Hypertension    Stented coronary artery  in 2008    Myocardial infarction  2012    PAD (peripheral artery disease)  S/p bypass left leg    HLD (hyperlipidemia)    BPH (benign prostatic hyperplasia)    Pain in left knee  s/p fall    OA (osteoarthritis)    [place] (hard of hearing)    Chronic anemia    S/P CABG (coronary artery bypass graft)  2012    H/O arterial bypass of lower limb  Left Lower Extremity (2016)    History of surgery  Left CEA (2017)  Left Pinkie toe Amputation (2014)  CABG x 4 (2012)  Card cath - stent (2008)      AV fistula  2019  LEFT AV FISTULA      SOCIAL HISTORY:  Negative for smoking/alcohol/drug use.     ALLERGIES:  No Known Allergies    MEDICATIONS:  STANDING MEDICATIONS  aspirin enteric coated 81 milliGRAM(s) Oral daily  atorvastatin 40 milliGRAM(s) Oral at bedtime  cefepime   IVPB 2000 milliGRAM(s) IV Intermittent <User Schedule>  clopidogrel Tablet 75 milliGRAM(s) Oral daily  cyanocobalamin 1000 MICROGram(s) Oral daily  darbepoetin Injectable Syringe 60 MICROGram(s) IV Push <User Schedule>  dextrose 5%. 1000 milliLiter(s) IV Continuous <Continuous>  dextrose 5%. 1000 milliLiter(s) IV Continuous <Continuous>  dextrose 50% Injectable 25 Gram(s) IV Push once  dextrose 50% Injectable 12.5 Gram(s) IV Push once  dextrose 50% Injectable 25 Gram(s) IV Push once  famotidine    Tablet 20 milliGRAM(s) Oral daily  folic acid 1 milliGRAM(s) Oral daily  furosemide    Tablet 40 milliGRAM(s) Oral daily  glucagon  Injectable 1 milliGRAM(s) IntraMuscular once  guaifenesin/dextromethorphan Oral Liquid 5 milliLiter(s) Oral three times a day  heparin   Injectable 5000 Unit(s) SubCutaneous every 8 hours  influenza   Vaccine 0.5 milliLiter(s) IntraMuscular once  insulin glargine Injectable (LANTUS) 13 Unit(s) SubCutaneous at bedtime  insulin lispro (ADMELOG) corrective regimen sliding scale   SubCutaneous three times a day before meals  insulin lispro Injectable (ADMELOG) 5 Unit(s) SubCutaneous before breakfast  insulin lispro Injectable (ADMELOG) 5 Unit(s) SubCutaneous before lunch  metoprolol tartrate 12.5 milliGRAM(s) Oral every 12 hours  NIFEdipine XL 30 milliGRAM(s) Oral daily  regadenoson Injectable 0.4 milliGRAM(s) IV Push once  remdesivir  IVPB   IV Intermittent   remdesivir  IVPB 100 milliGRAM(s) IV Intermittent every 24 hours  senna 2 Tablet(s) Oral at bedtime  sevelamer carbonate 800 milliGRAM(s) Oral three times a day with meals  tamsulosin 0.4 milliGRAM(s) Oral at bedtime    PRN MEDICATIONS  acetaminophen     Tablet .. 650 milliGRAM(s) Oral every 6 hours PRN  dextrose Oral Gel 15 Gram(s) Oral once PRN  melatonin 3 milliGRAM(s) Oral at bedtime PRN    VITALS:   T(F): 96  HR: 71  BP: 160/72  RR: 19  SpO2: 98%    LABS:                        7.4    4.24  )-----------( 168      ( 29 Nov 2022 06:56 )             22.9     11-29    138  |  98  |  43<H>  ----------------------------<  73  4.7   |  28  |  5.0<HH>    Ca    8.3<L>      29 Nov 2022 06:56  Mg     2.0     11-29    TPro  7.2  /  Alb  3.4<L>  /  TBili  0.2  /  DBili  <0.2  /  AST  7   /  ALT  <5  /  AlkPhos  154<H>  11-29    PT/INR - ( 29 Nov 2022 06:56 )   PT: 13.40 sec;   INR: 1.17 ratio         PTT - ( 29 Nov 2022 06:56 )  PTT:32.4 sec              RADIOLOGY:    PHYSICAL EXAM:  General: Awake, oriented and resting comfortably, no acute distress  Head: normocephalic, atraumatic  ENT:  moist mucous membranes  Cardiac: regular rate and rhythm, normal S1 and S2, no murmurs, rubs or gallops  Respiratory: clear to auscultation bilaterally, no wheezes, crackles or rhonchi, unlabored respirations  Abdomen: normoactive bowel sounds x4, non tender, nondistended  Ext:  No edema,   Neuro: A&O x3, no focal neurological deficits

## 2022-11-29 NOTE — PROGRESS NOTE ADULT - SUBJECTIVE AND OBJECTIVE BOX
Nephrology progress note    Patient was seen and examined, events over the last 24 h noted .    Allergies:  No Known Allergies    Hospital Medications:   MEDICATIONS  (STANDING):  aspirin enteric coated 81 milliGRAM(s) Oral daily  atorvastatin 40 milliGRAM(s) Oral at bedtime  cefepime   IVPB 2000 milliGRAM(s) IV Intermittent <User Schedule>  clopidogrel Tablet 75 milliGRAM(s) Oral daily  cyanocobalamin 1000 MICROGram(s) Oral daily  darbepoetin Injectable Syringe 60 MICROGram(s) IV Push <User Schedule>  dextrose 5%. 1000 milliLiter(s) (50 mL/Hr) IV Continuous <Continuous>  dextrose 5%. 1000 milliLiter(s) (100 mL/Hr) IV Continuous <Continuous>  dextrose 50% Injectable 25 Gram(s) IV Push once  dextrose 50% Injectable 12.5 Gram(s) IV Push once  dextrose 50% Injectable 25 Gram(s) IV Push once  famotidine    Tablet 20 milliGRAM(s) Oral daily  folic acid 1 milliGRAM(s) Oral daily  furosemide    Tablet 40 milliGRAM(s) Oral daily  glucagon  Injectable 1 milliGRAM(s) IntraMuscular once  guaifenesin/dextromethorphan Oral Liquid 5 milliLiter(s) Oral three times a day  heparin   Injectable 5000 Unit(s) SubCutaneous every 8 hours  influenza   Vaccine 0.5 milliLiter(s) IntraMuscular once  insulin glargine Injectable (LANTUS) 13 Unit(s) SubCutaneous at bedtime  insulin lispro (ADMELOG) corrective regimen sliding scale   SubCutaneous three times a day before meals  insulin lispro Injectable (ADMELOG) 5 Unit(s) SubCutaneous before breakfast  insulin lispro Injectable (ADMELOG) 5 Unit(s) SubCutaneous before lunch  metoprolol tartrate 12.5 milliGRAM(s) Oral every 12 hours  NIFEdipine XL 30 milliGRAM(s) Oral daily  regadenoson Injectable 0.4 milliGRAM(s) IV Push once  remdesivir  IVPB   IV Intermittent   remdesivir  IVPB 100 milliGRAM(s) IV Intermittent every 24 hours  senna 2 Tablet(s) Oral at bedtime  sevelamer carbonate 800 milliGRAM(s) Oral three times a day with meals  tamsulosin 0.4 milliGRAM(s) Oral at bedtime        VITALS:  T(F): 96.6 (11-29-22 @ 08:00), Max: 98.1 (11-28-22 @ 15:08)  HR: 90 (11-29-22 @ 08:00)  BP: 139/73 (11-29-22 @ 08:00)  RR: 19 (11-29-22 @ 08:00)  SpO2: 98% (11-29-22 @ 08:00)  Wt(kg): --    11-27 @ 07:01  -  11-28 @ 07:00  --------------------------------------------------------  IN: 840 mL / OUT: 0 mL / NET: 840 mL    11-28 @ 07:01  -  11-29 @ 07:00  --------------------------------------------------------  IN: 480 mL / OUT: 3800 mL / NET: -3320 mL      Height (cm): 180.3 (11-29 @ 06:55)  Weight (kg): 90.8 (11-29 @ 06:55)  BMI (kg/m2): 27.9 (11-29 @ 06:55)  BSA (m2): 2.11 (11-29 @ 06:55)    PHYSICAL EXAM:  Constitutional: NAD  HEENT: anicteric sclera, oropharynx clear, MMM  Neck: No JVD  Respiratory: CTAB, no wheezes, rales or rhonchi  Cardiovascular: S1, S2, RRR  Gastrointestinal: BS+, soft, NT/ND  Extremities: No cyanosis or clubbing. No peripheral edema  :  No schneider.   Skin: No rashes    LABS:  11-28    x   |  x   |  x   ----------------------------<  x   x    |  x   |  4.0<H>    Ca    8.2<L>      28 Nov 2022 05:20  Mg     2.0     11-28    TPro  7.3  /  Alb  3.3<L>  /  TBili  0.3  /  DBili  <0.2  /  AST  7   /  ALT  <5  /  AlkPhos  175<H>  11-28                          7.4    4.24  )-----------( 168      ( 29 Nov 2022 06:56 )             22.9       Urine Studies:      RADIOLOGY & ADDITIONAL STUDIES:

## 2022-11-29 NOTE — PROVIDER CONTACT NOTE (OTHER) - SITUATION
Present on assessment PIV in R AC (18g) w/ no blood return at this time. Pt is a hard stick requiring US guide PIV access.

## 2022-11-29 NOTE — PROGRESS NOTE ADULT - ASSESSMENT
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 and cefepime post dialysis s.p wound Vac, chronic anemia, HFmrEF, presented to the hospital for chest pain.    # Recent diagnosis of left calcaneal OM   - s/p removal of FB 9/16  - Wound Cx 9/16 - Enterobacter cloacae S/p  two weeks post-HD vancomycin + cefepime  - wound CX 10/16 VRE Faecim, Enterobacter cloacae complex, Proteus vulgaris, Morganella morganii   - s/p debridement 10/21 Wound Cx Proteus mirabilis, VRE faecium, Pseudomonas putida  - s/p debridement 10/24 Crumble L alcaenous - granular healthy post   - s/p debridement 11/3  - as per ID note on last admission: Cefepime 2g post HD through 11/30  - Local Wound Care; Wound Flushed w/ NS; Wound Packed w/ WTD / ABD / Kerlix / ACE  - Weight Bearing Status; WBAT with forefoot touch  - Surgical debridement today 11/29  - ID F/u    # Chest pain  # NSVT  # H/o CAD s/p PCI and CABG   # Cardiomyopathy, chr systolic and diastolic CHF  - Troponin T, Serum: 0.55: Critical value: ng/mL (11.17.22 @ 11:52)  - 12 Lead ECG (11.15.22 @ 09:59) >Normal sinus rhythm with sinus arrhythmia. Left anterior fascicular block  - NST 11/18/22: FIXED PERFUSION DEFECT INVOLVING ANTERIOR WALL OF THE LEFT VENTRICLE EXTENDING TO APEX CONSISTENT WITH SCAR. GLOBAL HYPOKINESIS WITH POOR THICKENING OF THE ANTERIOR WALL. EF  36 %   - TTE Echo Complete w/ Contrast w/ Doppler (11.18.22 @ 08:35) >Mildly decreased global left ventricular systolic function with mild concentric hypertrophy and normal internal cavity size. With echocontrast, a moderate sized apical aneurysm is noted. There is no evidence of LV thrombus. Left ventricular ejection fraction, by visual estimation, is 45 to   50%.Grade III diastolic dysfunction with elevated left atrial and left ventricular end-diastolic pressures.  - cardiology eval: Nuclear   stress test no ischemia   fixed    defect  cont medical   therapy  - c/w ASA, plavix , statin, metoprolol, procardia  - c/w lasix    #  ESRD on HD   # Normocytic anemia  - HD per nephrology  - s/p PRBC   - RAMSEY weekly post HD  - transfuse with 1 unit PRBC  - monitor cbc  - c/w renagel    # PAD s/p angioplasty and stent b/l LE  - c/w ASA, statin, plavix    # COVId 19  - Xray Chest 1 View- PORTABLE-Routine (Xray Chest 1 View- PORTABLE-Routine .) (11.28.22 @ 15:50) >Persistent pulmonary vascular congestion.  - COVID-19 PCR: Detected: 11.25.22 @ 17:41)  - oxygen sat 98 on RA  - Dc RDV    # DM type2  - A1C with Estimated Average Glucose Result: 6.8 % (10.15.22 @ 09:53)  - monitor FS  - c/w lantus, lispro    # H/o BPH  - c/w flomax    # DVT prophylaxis    # Full code    # Pending: monitor cbc, Surgical debridement today 11/29  # Discussed plan of care with patient  # Disposition: SNF when stable

## 2022-11-29 NOTE — PRE-ANESTHESIA EVALUATION ADULT - NSANTHOSAYNRD_GEN_A_CORE
No. HAYDEN screening performed.  STOP BANG Legend: 0-2 = LOW Risk; 3-4 = INTERMEDIATE Risk; 5-8 = HIGH Risk
No. HAYDEN screening performed.  STOP BANG Legend: 0-2 = LOW Risk; 3-4 = INTERMEDIATE Risk; 5-8 = HIGH Risk

## 2022-11-29 NOTE — BRIEF OPERATIVE NOTE - NSICDXBRIEFPOSTOP_GEN_ALL_CORE_FT
POST-OP DIAGNOSIS:  Acute osteomyelitis of calcaneum 29-Nov-2022 18:42:38  Ifeanyi Zarate  Pathologic calcaneal fracture 29-Nov-2022 18:42:52  Ifeanyi Zarate  Diabetic foot ulcer 29-Nov-2022 18:43:02  Ifeanyi Zarate

## 2022-11-29 NOTE — BRIEF OPERATIVE NOTE - NSICDXBRIEFPREOP_GEN_ALL_CORE_FT
PRE-OP DIAGNOSIS:  Acute osteomyelitis of calcaneum 29-Nov-2022 18:41:42  Ifeanyi Zarate  Pathologic calcaneal fracture 29-Nov-2022 18:42:04  Ifeanyi Zarate  Diabetic foot ulcer 29-Nov-2022 18:42:22  Ifeanyi Zarate

## 2022-11-29 NOTE — PROGRESS NOTE ADULT - SUBJECTIVE AND OBJECTIVE BOX
Patient is a 62y old  Male who presents with a chief complaint of Chest pain (29 Nov 2022 08:49)    Patient was seen and examined.  Surgical debridement today 11/29  Denies any new complaints.    PAST MEDICAL & SURGICAL HISTORY:  Myocardial infarction 2012  CAD (coronary artery disease), 1 stent 2008, S/P CABG (coronary artery bypass graft) x4  Diabetes mellitus  Transient ischemic attack (TIA) 2017; 2008  Chronic kidney disease (CKD) Stage IV  Hypertension  PAD (peripheral artery disease) S/p bypass left leg 2016  HLD (hyperlipidemia)  BPH (benign prostatic hyperplasia)  Pain in left knee s/p fall  OA (osteoarthritis)  [place]  Chronic anemia    History of surgery  Left CEA (2017)  Left Pinkie toe Amputation (2014)  CABG x 4 (2012)  Card cath - stent (2008)  AV fistula 2019  LEFT AV FISTULA    Allergies  No Known Allergies    MEDICATIONS  (STANDING):  aspirin enteric coated 81 milliGRAM(s) Oral daily  atorvastatin 40 milliGRAM(s) Oral at bedtime  cefepime   IVPB 2000 milliGRAM(s) IV Intermittent <User Schedule>  clopidogrel Tablet 75 milliGRAM(s) Oral daily  cyanocobalamin 1000 MICROGram(s) Oral daily  darbepoetin Injectable Syringe 60 MICROGram(s) IV Push <User Schedule>  famotidine    Tablet 20 milliGRAM(s) Oral daily  folic acid 1 milliGRAM(s) Oral daily  furosemide    Tablet 40 milliGRAM(s) Oral daily  glucagon  Injectable 1 milliGRAM(s) IntraMuscular once  guaifenesin/dextromethorphan Oral Liquid 5 milliLiter(s) Oral three times a day  heparin   Injectable 5000 Unit(s) SubCutaneous every 8 hours  influenza   Vaccine 0.5 milliLiter(s) IntraMuscular once  insulin glargine Injectable (LANTUS) 13 Unit(s) SubCutaneous at bedtime  insulin lispro (ADMELOG) corrective regimen sliding scale   SubCutaneous three times a day before meals  insulin lispro Injectable (ADMELOG) 5 Unit(s) SubCutaneous before breakfast  insulin lispro Injectable (ADMELOG) 5 Unit(s) SubCutaneous before lunch  metoprolol tartrate 12.5 milliGRAM(s) Oral every 12 hours  NIFEdipine XL 30 milliGRAM(s) Oral daily  regadenoson Injectable 0.4 milliGRAM(s) IV Push once  remdesivir  IVPB   IV Intermittent   remdesivir  IVPB 100 milliGRAM(s) IV Intermittent every 24 hours  senna 2 Tablet(s) Oral at bedtime  sevelamer carbonate 800 milliGRAM(s) Oral three times a day with meals  tamsulosin 0.4 milliGRAM(s) Oral at bedtime    MEDICATIONS  (PRN):  acetaminophen     Tablet .. 650 milliGRAM(s) Oral every 6 hours PRN Mild Pain (1 - 3)  dextrose Oral Gel 15 Gram(s) Oral once PRN Blood Glucose LESS THAN 70 milliGRAM(s)/deciliter  melatonin 3 milliGRAM(s) Oral at bedtime PRN Insomnia    Vital Signs Last 24 Hrs  T(C): 35  T(F): 95  HR: 90  BP: 139/73  BP(mean): --  RR: 19  SpO2: 98%    O/E:  Awake, alert, not in distress.  HEENT: atraumatic, EOMI.  Chest: clear.  CVS: SIS2 +, no murmur.  P/A: Soft, BS+  CNS: awake , alert  Ext:  left foot ulcer+, dressing+  All systems reviewed positive findings as above.    POCT Blood Glucose.: 91 mg/dL (29 Nov 2022 12:19)  POCT Blood Glucose.: 77 mg/dL (29 Nov 2022 11:03)  POCT Blood Glucose.: 82 mg/dL (29 Nov 2022 08:15)  POCT Blood Glucose.: 169 mg/dL (28 Nov 2022 23:10)  POCT Blood Glucose.: 192 mg/dL (28 Nov 2022 21:00)  POCT Blood Glucose.: 139 mg/dL (28 Nov 2022 16:31)                        7.4<L>  4.24<L> )-----------( 168      ( 29 Nov 2022 06:56 )             22.9<L>                        7.2<L>  6.19  )-----------( 170      ( 28 Nov 2022 05:20 )             22.3<L>    11-29    138  |  98  |  43<H>  ----------------------------<  73  4.7   |  28  |  5.0<HH>  11-28    x   |  x   |  x   ----------------------------<  x   x    |  x   |  4.0<H>    Ca    8.3<L>      29 Nov 2022 06:56  Ca    8.2<L>      28 Nov 2022 05:20  Mg     2.0     11-29    TPro  7.2  /  Alb  3.4<L>  /  TBili  0.2  /  DBili  <0.2  /  AST  7   /  ALT  <5  /  AlkPhos  154<H>  11-29  TPro  7.3  /  Alb  3.3<L>  /  TBili  0.3  /  DBili  <0.2  /  AST  7   /  ALT  <5  /  AlkPhos  175<H>  11-28          PT/INR - ( 29 Nov 2022 06:56 )   PT: 13.40 sec;   INR: 1.17 ratio         PTT - ( 29 Nov 2022 06:56 )  PTT:32.4 sec

## 2022-11-29 NOTE — BRIEF OPERATIVE NOTE - NSICDXBRIEFPROCEDURE_GEN_ALL_CORE_FT
PROCEDURES:  Exostectomy, bossing, foot 29-Nov-2022 18:41:00  Ifeanyi Zarate  Debridement, bone 29-Nov-2022 18:41:07  Ifeanyi Zarate  Open biopsy, bone, foot 29-Nov-2022 18:41:19  Ifeanyi Zarate

## 2022-11-29 NOTE — PROGRESS NOTE ADULT - ASSESSMENT
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 and cefepime post dialysis s.p wound Vac, chronic anemia, HFmrEF, presented to the hospital for chest pain.     #ACS/CAD patient presenting with Chest pain-resolved   #CAD s/p PCI/CABG  #PAD s/p angioplasty  #HTN/HLD  #Chronic HFmrEF  - Troponin 0.70 in setting of ESRD, 0.25 back in 09/2022  - Troponin stable between 0.7-0.8  - TTE LVEF 45-50%, Grade 3 diastolic dysfunction  - Nuclear stress test: FIXED PERFUSION DEFECT INVOLVING ANTERIOR WALL OF THE LEFT VENTRICLE EXTENDING TO APEX CONSISTENT WITH SCAR. GLOBAL HYPOKINESIS WITH POOR THICKENING OF THE ANTERIOR WALL. EF  36 %   - c/w ASA 81 mg daily  - c/w Plavix 75 mg daily  - c/w atorvastatin 40 mg daily  - c/w Nifedipine 30 mg daily  - EKG: Left anterior fascicular block ST & T wave abnormality, consider lateral ischemia. Prolonged QT    #Recent hx Left calcaneal OM   - Wound Cx 9/16 - Enterobacter cloacae  - wound CX 10/16 VRE Faecium Enterobacter cloacae complex, Proteus vulgaris, Morganella morganii   - s/p debridement 10/21 Wound Cx Proteus mirabilis, VRE faecium, Pseudomonas putida  - s/p debridement 10/24 Crumble L calcaneous   - s/p debridement 11/3 with wound vac in place  - Cefepime 2g post HD through 11/30; s/p 3 session of daptomycin post-HD  - On last admission, ID plan for 4 weeks from last debridement (11/3-11/30)  -Debridement 11/21 PM cancelled  -Rescheduled debridement for 11/25  -Debridement cancelled again-rescheduled for 11/29/30----pt is npo. will be performed today.   -pt requested PT until surgery    #Covid+  -pt tested + for covid on 11/26   -started on remdesavir  -fu chest xray  -isolation protocol    #NSVT  -11/16 PM 10 beats of NSVT observed on tele   -Started Metoprolol 12.5 BID  -Maintain K>4 and Mg>2    # Anemia of chronic disease  - hb on admission 6.6 baseline 7.6. s/p 1U PRBC  -11/21 pt got 1 unit pRBCs   - Monitor CBC, transfuse to keep >8  -maintain active T&S     # ESRD on HD   - HD per nephrology  - B/L pitting edema, little urine output       # PAD  - s/p angiogram LLE with peroneal artery angioplasty and arterectomy 10/27  - Cont meds as above    # Type 2 DM  -Continue insulin regimen    # BPH  - c/w Tamsulosin 0.4 mg daily      -DVT prophylaxis: Heparin  -GI prophylaxis: Not indicated  -Diet: Renal, 1.2l fluid restriction  -Code status: Full code      Pending: Podiatry procedure 11/29/30

## 2022-11-29 NOTE — PROVIDER CONTACT NOTE (OTHER) - BACKGROUND
Pt ordered for 1 unit PRBC prior to procedure today requiring 18g IV.
Pt made NPO this am for stress test.

## 2022-11-29 NOTE — PROGRESS NOTE ADULT - ASSESSMENT
63 yo male, h/o type 2 DM on insulin, CAD s.p stent 2008, s/p CABG 2012 (Dr Smith), PAD s/p angioplasty and stent b/l LE, BPH, ESRD on HD through left AVF (MWF follows with Dr MARY Paula), DFU sp left toe amputation, left calcaneal OM on daptomycin and cefepime post dialysis s.p wound Vac, chronic anemia, HFmrEF, presented to the hospital for chest pain. Found to have anemia s/p 1 unit of prbc.    ESRD on HD/ Anemia / Chest pain/ OM on abx  HD yesterday  RAMSEY weekly post HD 60 mcg/ transfuse if Hb< 7  on sevelamer 800 mg po tid/ phos at goal /   on lasix non oliguric    BP controlled  abx for OM - Cefepime  s/p debridement with podiatry 11/21/cardiology f/up noted / OR rescheduled positive covid     will follow

## 2022-11-30 LAB
ALBUMIN SERPL ELPH-MCNC: 3.3 G/DL — LOW (ref 3.5–5.2)
ALBUMIN SERPL ELPH-MCNC: 3.4 G/DL — LOW (ref 3.5–5.2)
ALP SERPL-CCNC: 141 U/L — HIGH (ref 30–115)
ALP SERPL-CCNC: 145 U/L — HIGH (ref 30–115)
ALT FLD-CCNC: <5 U/L — SIGNIFICANT CHANGE UP (ref 0–41)
ALT FLD-CCNC: <5 U/L — SIGNIFICANT CHANGE UP (ref 0–41)
ANION GAP SERPL CALC-SCNC: 13 MMOL/L — SIGNIFICANT CHANGE UP (ref 7–14)
AST SERPL-CCNC: 6 U/L — SIGNIFICANT CHANGE UP (ref 0–41)
AST SERPL-CCNC: 6 U/L — SIGNIFICANT CHANGE UP (ref 0–41)
BASOPHILS # BLD AUTO: 0.06 K/UL — SIGNIFICANT CHANGE UP (ref 0–0.2)
BASOPHILS NFR BLD AUTO: 1.3 % — HIGH (ref 0–1)
BILIRUB DIRECT SERPL-MCNC: <0.2 MG/DL — SIGNIFICANT CHANGE UP (ref 0–0.3)
BILIRUB INDIRECT FLD-MCNC: >0.1 MG/DL — LOW (ref 0.2–1.2)
BILIRUB SERPL-MCNC: 0.2 MG/DL — SIGNIFICANT CHANGE UP (ref 0.2–1.2)
BILIRUB SERPL-MCNC: 0.3 MG/DL — SIGNIFICANT CHANGE UP (ref 0.2–1.2)
BUN SERPL-MCNC: 43 MG/DL — HIGH (ref 10–20)
CALCIUM SERPL-MCNC: 8 MG/DL — LOW (ref 8.4–10.5)
CHLORIDE SERPL-SCNC: 98 MMOL/L — SIGNIFICANT CHANGE UP (ref 98–110)
CO2 SERPL-SCNC: 26 MMOL/L — SIGNIFICANT CHANGE UP (ref 17–32)
CREAT SERPL-MCNC: 4.6 MG/DL — CRITICAL HIGH (ref 0.7–1.5)
EGFR: 13 ML/MIN/1.73M2 — LOW
EOSINOPHIL # BLD AUTO: 0.16 K/UL — SIGNIFICANT CHANGE UP (ref 0–0.7)
EOSINOPHIL NFR BLD AUTO: 3.4 % — SIGNIFICANT CHANGE UP (ref 0–8)
GLUCOSE BLDC GLUCOMTR-MCNC: 113 MG/DL — HIGH (ref 70–99)
GLUCOSE BLDC GLUCOMTR-MCNC: 151 MG/DL — HIGH (ref 70–99)
GLUCOSE BLDC GLUCOMTR-MCNC: 180 MG/DL — HIGH (ref 70–99)
GLUCOSE BLDC GLUCOMTR-MCNC: 185 MG/DL — HIGH (ref 70–99)
GLUCOSE SERPL-MCNC: 154 MG/DL — HIGH (ref 70–99)
HCT VFR BLD CALC: 21.4 % — LOW (ref 42–52)
HGB BLD-MCNC: 7.3 G/DL — LOW (ref 14–18)
IMM GRANULOCYTES NFR BLD AUTO: 0.4 % — HIGH (ref 0.1–0.3)
LYMPHOCYTES # BLD AUTO: 0.66 K/UL — LOW (ref 1.2–3.4)
LYMPHOCYTES # BLD AUTO: 14 % — LOW (ref 20.5–51.1)
MAGNESIUM SERPL-MCNC: 1.9 MG/DL — SIGNIFICANT CHANGE UP (ref 1.8–2.4)
MCHC RBC-ENTMCNC: 30.8 PG — SIGNIFICANT CHANGE UP (ref 27–31)
MCHC RBC-ENTMCNC: 34.1 G/DL — SIGNIFICANT CHANGE UP (ref 32–37)
MCV RBC AUTO: 90.3 FL — SIGNIFICANT CHANGE UP (ref 80–94)
MONOCYTES # BLD AUTO: 0.4 K/UL — SIGNIFICANT CHANGE UP (ref 0.1–0.6)
MONOCYTES NFR BLD AUTO: 8.5 % — SIGNIFICANT CHANGE UP (ref 1.7–9.3)
NEUTROPHILS # BLD AUTO: 3.4 K/UL — SIGNIFICANT CHANGE UP (ref 1.4–6.5)
NEUTROPHILS NFR BLD AUTO: 72.4 % — SIGNIFICANT CHANGE UP (ref 42.2–75.2)
NRBC # BLD: 0 /100 WBCS — SIGNIFICANT CHANGE UP (ref 0–0)
PLATELET # BLD AUTO: 180 K/UL — SIGNIFICANT CHANGE UP (ref 130–400)
POTASSIUM SERPL-MCNC: 4.4 MMOL/L — SIGNIFICANT CHANGE UP (ref 3.5–5)
POTASSIUM SERPL-SCNC: 4.4 MMOL/L — SIGNIFICANT CHANGE UP (ref 3.5–5)
PROT SERPL-MCNC: 6.8 G/DL — SIGNIFICANT CHANGE UP (ref 6–8)
PROT SERPL-MCNC: 6.9 G/DL — SIGNIFICANT CHANGE UP (ref 6–8)
RBC # BLD: 2.37 M/UL — LOW (ref 4.7–6.1)
RBC # FLD: 16.7 % — HIGH (ref 11.5–14.5)
SODIUM SERPL-SCNC: 137 MMOL/L — SIGNIFICANT CHANGE UP (ref 135–146)
WBC # BLD: 4.7 K/UL — LOW (ref 4.8–10.8)
WBC # FLD AUTO: 4.7 K/UL — LOW (ref 4.8–10.8)

## 2022-11-30 PROCEDURE — 99232 SBSQ HOSP IP/OBS MODERATE 35: CPT

## 2022-11-30 RX ORDER — HEPARIN SODIUM 5000 [USP'U]/ML
5000 INJECTION INTRAVENOUS; SUBCUTANEOUS EVERY 8 HOURS
Refills: 0 | Status: DISCONTINUED | OUTPATIENT
Start: 2022-11-30 | End: 2022-12-05

## 2022-11-30 RX ADMIN — REMDESIVIR 500 MILLIGRAM(S): 5 INJECTION INTRAVENOUS at 22:15

## 2022-11-30 RX ADMIN — Medication 5 MILLILITER(S): at 06:12

## 2022-11-30 RX ADMIN — Medication 5 UNIT(S): at 12:24

## 2022-11-30 RX ADMIN — INSULIN GLARGINE 7 UNIT(S): 100 INJECTION, SOLUTION SUBCUTANEOUS at 21:31

## 2022-11-30 RX ADMIN — FAMOTIDINE 20 MILLIGRAM(S): 10 INJECTION INTRAVENOUS at 12:26

## 2022-11-30 RX ADMIN — HEPARIN SODIUM 5000 UNIT(S): 5000 INJECTION INTRAVENOUS; SUBCUTANEOUS at 15:57

## 2022-11-30 RX ADMIN — Medication 1 MILLIGRAM(S): at 12:26

## 2022-11-30 RX ADMIN — SEVELAMER CARBONATE 800 MILLIGRAM(S): 2400 POWDER, FOR SUSPENSION ORAL at 08:04

## 2022-11-30 RX ADMIN — SENNA PLUS 2 TABLET(S): 8.6 TABLET ORAL at 22:15

## 2022-11-30 RX ADMIN — Medication 5 MILLILITER(S): at 15:56

## 2022-11-30 RX ADMIN — INSULIN GLARGINE 13 UNIT(S): 100 INJECTION, SOLUTION SUBCUTANEOUS at 21:31

## 2022-11-30 RX ADMIN — Medication 5 UNIT(S): at 08:03

## 2022-11-30 RX ADMIN — Medication 60 MICROGRAM(S): at 10:09

## 2022-11-30 RX ADMIN — ATORVASTATIN CALCIUM 40 MILLIGRAM(S): 80 TABLET, FILM COATED ORAL at 21:31

## 2022-11-30 RX ADMIN — Medication 81 MILLIGRAM(S): at 12:25

## 2022-11-30 RX ADMIN — Medication 5 MILLILITER(S): at 21:31

## 2022-11-30 RX ADMIN — CEFEPIME 100 MILLIGRAM(S): 1 INJECTION, POWDER, FOR SOLUTION INTRAMUSCULAR; INTRAVENOUS at 10:10

## 2022-11-30 RX ADMIN — Medication 12.5 MILLIGRAM(S): at 18:26

## 2022-11-30 RX ADMIN — HEPARIN SODIUM 5000 UNIT(S): 5000 INJECTION INTRAVENOUS; SUBCUTANEOUS at 21:32

## 2022-11-30 RX ADMIN — Medication 30 MILLIGRAM(S): at 06:11

## 2022-11-30 RX ADMIN — CLOPIDOGREL BISULFATE 75 MILLIGRAM(S): 75 TABLET, FILM COATED ORAL at 12:25

## 2022-11-30 RX ADMIN — Medication 12.5 MILLIGRAM(S): at 06:11

## 2022-11-30 RX ADMIN — Medication 1: at 08:03

## 2022-11-30 RX ADMIN — PREGABALIN 1000 MICROGRAM(S): 225 CAPSULE ORAL at 12:27

## 2022-11-30 RX ADMIN — SEVELAMER CARBONATE 800 MILLIGRAM(S): 2400 POWDER, FOR SUSPENSION ORAL at 12:27

## 2022-11-30 RX ADMIN — TAMSULOSIN HYDROCHLORIDE 0.4 MILLIGRAM(S): 0.4 CAPSULE ORAL at 21:31

## 2022-11-30 RX ADMIN — SEVELAMER CARBONATE 800 MILLIGRAM(S): 2400 POWDER, FOR SUSPENSION ORAL at 18:26

## 2022-11-30 RX ADMIN — Medication 40 MILLIGRAM(S): at 06:12

## 2022-11-30 NOTE — PROGRESS NOTE ADULT - ASSESSMENT
63 yo male, h/o type 2 DM on insulin, CAD s.p stent 2008, s/p CABG 2012 (Dr Smith), PAD s/p angioplasty and stent b/l LE, BPH, ESRD on HD through left AVF (MWF follows with Dr MARY Paula), DFU sp left toe amputation, left calcaneal OM on daptomycin and cefepime post dialysis s.p wound Vac, chronic anemia, HFmrEF, presented to the hospital for chest pain. Found to have anemia s/p 1 unit of prbc.    ESRD on HD/ Anemia / Chest pain/ OM on abx  HD TODAY 3H uf 3L as toelrated   RAMSEY weekly post HD 60 mcg/ transfuse if Hb< 7  on sevelamer 800 mg po tid/ phos at goal /   on lasix non oliguric    BP controlled  abx for OM - Cefepime  s/p debridement with podiatry 11/21 and 11/29 has calcaneal OM on cefepime for now     will follow

## 2022-11-30 NOTE — PROGRESS NOTE ADULT - SUBJECTIVE AND OBJECTIVE BOX
Nephrology progress note  Patient is seen and examined, events over the last 24 h noted .  Lying in bed comfortable  sp OR yesterday     Allergies:  No Known Allergies    Hospital Medications:   MEDICATIONS  (STANDING):    aspirin enteric coated 81 milliGRAM(s) Oral daily  atorvastatin 40 milliGRAM(s) Oral at bedtime  cefepime   IVPB 2000 milliGRAM(s) IV Intermittent <User Schedule>  clopidogrel Tablet 75 milliGRAM(s) Oral daily  cyanocobalamin 1000 MICROGram(s) Oral daily  darbepoetin Injectable Syringe 60 MICROGram(s) IV Push <User Schedule>  famotidine    Tablet 20 milliGRAM(s) Oral daily  folic acid 1 milliGRAM(s) Oral daily  furosemide    Tablet 40 milliGRAM(s) Oral daily  glucagon  Injectable 1 milliGRAM(s) IntraMuscular once  guaifenesin/dextromethorphan Oral Liquid 5 milliLiter(s) Oral three times a day  influenza   Vaccine 0.5 milliLiter(s) IntraMuscular once  insulin glargine Injectable (LANTUS) 7 Unit(s) SubCutaneous at bedtime  insulin glargine Injectable (LANTUS) 13 Unit(s) SubCutaneous at bedtime  insulin lispro (ADMELOG) corrective regimen sliding scale   SubCutaneous three times a day before meals  insulin lispro Injectable (ADMELOG) 5 Unit(s) SubCutaneous before breakfast  insulin lispro Injectable (ADMELOG) 5 Unit(s) SubCutaneous before lunch  metoprolol tartrate 12.5 milliGRAM(s) Oral every 12 hours  NIFEdipine XL 30 milliGRAM(s) Oral daily  regadenoson Injectable 0.4 milliGRAM(s) IV Push once  remdesivir  IVPB 100 milliGRAM(s) IV Intermittent every 24 hours  remdesivir  IVPB   IV Intermittent   senna 2 Tablet(s) Oral at bedtime  sevelamer carbonate 800 milliGRAM(s) Oral three times a day with meals  tamsulosin 0.4 milliGRAM(s) Oral at bedtime        VITALS:  T(F): 97.5 (11-29-22 @ 19:19), Max: 97.5 (11-29-22 @ 19:19)  HR: 78 (11-30-22 @ 08:15)  BP: 125/70 (11-30-22 @ 08:15)  RR: 18 (11-30-22 @ 08:15)  SpO2: 95% (11-29-22 @ 20:00)      11-28 @ 07:01  -  11-29 @ 07:00  --------------------------------------------------------  IN: 480 mL / OUT: 3800 mL / NET: -3320 mL      Height (cm): 180.3 (11-29 @ 17:20)  Weight (kg): 90.8 (11-29 @ 17:20)  BMI (kg/m2): 27.9 (11-29 @ 17:20)  BSA (m2): 2.11 (11-29 @ 17:20)    PHYSICAL EXAM:  Constitutional: NAD  Neck: No JVD  Respiratory: CTAB,  Cardiovascular: S1, S2, RRR  Gastrointestinal: BS+, soft, NT/ND  Extremities: No cyanosis or clubbing. No peripheral edema  :  No schneider.   Skin: No rashes    LABS:  11-29    138  |  98  |  43<H>  ----------------------------<  73  4.7   |  28  |  5.0<HH>    Ca    8.3<L>      29 Nov 2022 06:56  Mg     2.0     11-29    TPro  7.2  /  Alb  3.4<L>  /  TBili  0.2  /  DBili  <0.2  /  AST  7   /  ALT  <5  /  AlkPhos  154<H>  11-29                          7.4    4.24  )-----------( 168      ( 29 Nov 2022 06:56 )             22.9       Urine Studies:        Iron 39, TIBC 133, %sat 29      [10-21-22 @ 10:30]  Ferritin 1126      [10-21-22 @ 10:30]  PTH -- (Ca 7.5)      [02-26-22 @ 16:00]   312  Vitamin D (25OH) 21      [02-26-22 @ 16:00]  HbA1c 5.8      [01-30-20 @ 06:46]  Lipid: chol 81, , HDL 22, LDL --      [10-15-22 @ 09:53]    HBsAb <3.0      [12-29-19 @ 17:44]  HBsAb Nonreact      [11-03-22 @ 06:40]  HBsAg Nonreact      [11-03-22 @ 06:40]  HBcAb Nonreact      [11-03-22 @ 06:40]  HCV 0.14, Nonreact      [10-27-22 @ 04:09]        RADIOLOGY & ADDITIONAL STUDIES:

## 2022-11-30 NOTE — PROGRESS NOTE ADULT - SUBJECTIVE AND OBJECTIVE BOX
SCHWABACHER, LAWRENCE  62y  [facility] [id number] B      Patient is a 62y old  Male who presents with a chief complaint of Chest pain (30 Nov 2022 13:29)      INTERVAL HPI/OVERNIGHT EVENTS:  Patient feels well. he has no complains. he's agreeable with dc planning and blood transfusion no overnight events       REVIEW OF SYSTEMS:        FAMILY HISTORY:  Family history of heart disease (Father)    DM (diabetes mellitus) (Mother)    ESRD (end stage renal disease) on dialysis (Mother)      T(C): 36.2 (11-30-22 @ 15:35), Max: 36.4 (11-29-22 @ 19:19)  HR: 88 (11-30-22 @ 15:35) (71 - 98)  BP: 133/60 (11-30-22 @ 15:35) (123/64 - 163/83)  RR: 20 (11-30-22 @ 15:35) (16 - 22)  SpO2: 95% (11-29-22 @ 20:00) (95% - 100%)  Wt(kg): --Vital Signs Last 24 Hrs  T(C): 36.2 (30 Nov 2022 15:35), Max: 36.4 (29 Nov 2022 19:19)  T(F): 97.2 (30 Nov 2022 15:35), Max: 97.5 (29 Nov 2022 19:19)  HR: 88 (30 Nov 2022 15:35) (71 - 98)  BP: 133/60 (30 Nov 2022 15:35) (123/64 - 163/83)  BP(mean): 104 (29 Nov 2022 17:20) (104 - 104)  RR: 20 (30 Nov 2022 15:35) (16 - 22)  SpO2: 95% (29 Nov 2022 20:00) (95% - 100%)    Parameters below as of 30 Nov 2022 11:15  Patient On (Oxygen Delivery Method): room air        PHYSICAL EXAM:  GENERAL: NAD, well-groomed, well-developed  NERVOUS SYSTEM:  Alert & Oriented X3, Good concentration;  PULM: Clear to auscultation bilaterally  CARDIAC: Regular rate and rhythm  GI: Soft, Nontender, Nondistended; Bowel sounds present  EXTREMITIES:  left foot wrapper     Consultant(s) Notes Reviewed:  [x ] YES  [ ] NO  Care Discussed with Consultants/Other Providers [ x] YES  [ ] NO    LABS:                            7.3    4.70  )-----------( 180      ( 30 Nov 2022 09:36 )             21.4   11-30    137  |  98  |  43<H>  ----------------------------<  154<H>  4.4   |  26  |  4.6<HH>    Ca    8.0<L>      30 Nov 2022 09:36  Mg     1.9     11-30    TPro  6.9  /  Alb  3.4<L>  /  TBili  0.2  /  DBili  <0.2  /  AST  6   /  ALT  <5  /  AlkPhos  141<H>  11-30            acetaminophen     Tablet .. 650 milliGRAM(s) Oral every 6 hours PRN  aspirin enteric coated 81 milliGRAM(s) Oral daily  atorvastatin 40 milliGRAM(s) Oral at bedtime  cefepime   IVPB 2000 milliGRAM(s) IV Intermittent <User Schedule>  clopidogrel Tablet 75 milliGRAM(s) Oral daily  cyanocobalamin 1000 MICROGram(s) Oral daily  darbepoetin Injectable Syringe 60 MICROGram(s) IV Push <User Schedule>  dextrose 5%. 1000 milliLiter(s) IV Continuous <Continuous>  dextrose 5%. 1000 milliLiter(s) IV Continuous <Continuous>  dextrose 50% Injectable 25 Gram(s) IV Push once  dextrose 50% Injectable 12.5 Gram(s) IV Push once  dextrose 50% Injectable 25 Gram(s) IV Push once  dextrose Oral Gel 15 Gram(s) Oral once PRN  famotidine    Tablet 20 milliGRAM(s) Oral daily  folic acid 1 milliGRAM(s) Oral daily  furosemide    Tablet 40 milliGRAM(s) Oral daily  glucagon  Injectable 1 milliGRAM(s) IntraMuscular once  guaifenesin/dextromethorphan Oral Liquid 5 milliLiter(s) Oral three times a day  heparin   Injectable 5000 Unit(s) SubCutaneous every 8 hours  influenza   Vaccine 0.5 milliLiter(s) IntraMuscular once  insulin glargine Injectable (LANTUS) 7 Unit(s) SubCutaneous at bedtime  insulin glargine Injectable (LANTUS) 13 Unit(s) SubCutaneous at bedtime  insulin lispro (ADMELOG) corrective regimen sliding scale   SubCutaneous three times a day before meals  insulin lispro Injectable (ADMELOG) 5 Unit(s) SubCutaneous before breakfast  insulin lispro Injectable (ADMELOG) 5 Unit(s) SubCutaneous before lunch  melatonin 3 milliGRAM(s) Oral at bedtime PRN  metoprolol tartrate 12.5 milliGRAM(s) Oral every 12 hours  NIFEdipine XL 30 milliGRAM(s) Oral daily  regadenoson Injectable 0.4 milliGRAM(s) IV Push once  remdesivir  IVPB 100 milliGRAM(s) IV Intermittent every 24 hours  remdesivir  IVPB   IV Intermittent   senna 2 Tablet(s) Oral at bedtime  sevelamer carbonate 800 milliGRAM(s) Oral three times a day with meals  tamsulosin 0.4 milliGRAM(s) Oral at bedtime    63 yo male, h/o type 2 DM on insulin, CAD s.p stent 2008, s/p CABG 2012 (Dr Smith), PAD s/p angioplasty and stent b/l LE, BPH, ESRD on HD through left AVF (MWF follows with Dr MARY Paula), DFU sp left toe amputation, left calcaneal OM on daptomycin and cefepime post dialysis s.p wound Vac, chronic anemia, HFmrEF, presented to the hospital for chest pain     1. Chest pain. hx of CAD s/p PCI/CABG and PAD s/p angioplasty  ACS/CAD presenting with Chest pain  - Admit to medicine  - Cont aspirin and plavix   - Cont statin 40mg HS   - Cont Nifedipine 30mg po daily   - Troponin stable between 0.7-0.8  NST: FIXED PERFUSION DEFECT INVOLVING ANTERIOR WALL OF THE LEFT VENTRICLE EXTENDING TO APEX CONSISTENT WITH SCAR. GLOBAL HYPOKINESIS WITH POOR THICKENING OF THE ANTERIOR WALL. EF  36 %       2. NSVT  -10 beats of NSVT observed on tele on 11/16 PM  -Started Metoprolol 12.5 BID  -Maintain K>4 and Mg>2    3. Anemia of chronic disease s/p multiple PRBC  - Monitor CBC, transfuse to keep >8    4.  ESRD on HD   - HD per nephrology  - B/L pitting edema, little urine output    5. Recent hx Left calcaneal OM   - Wound Cx 9/16 - Enterobacter cloacae  - wound CX 10/16 VRE Faecium Enterobacter cloacae complex, Proteus vulgaris, Morganella morganii   - s/p debridement 10/21 Wound Cx Proteus mirabilis, VRE faecium, Pseudomonas putida  - s/p debridement 10/24 Crumble L calcaneous   - s/p debridement 11/3 with wound vac in place  - Cefepime 2g post HD through 11/30; s/p 3 session of daptomycin post-HD  - On last admission, ID plan for 4 weeks from last debridement (11/3-11/30)  -Debridement done on 11/29/2022.   - Podiatry consult appreciated     6.  PAD  - s/p angiogram LLE with peroneal artery angioplasty and arterectomy 10/27  - Cont meds as above    7. Type 2 DM  - Adjusted insulin due to hypoglycemia in am  - Lantus was decreased from 20u to 13unit     8.  BPH  - c/w Tamsulosin 0.4 mg daily    9. COVID 19 asymptomatic   - Finished a 3 day course of remdisivir     Pending: Podiatry procedure  -DVT prophylaxis: Heparin  -GI prophylaxis: Not indicated  -Diet: Renal, 1.2l fluid restriction  -Code status: Full code        Case Discussed with House Staff   36 minutes spent on total encounter; more than 50% of the visit was spent counseling and/or coordinating care by the attending physician.   Spectra [phone]

## 2022-12-01 ENCOUNTER — TRANSCRIPTION ENCOUNTER (OUTPATIENT)
Age: 62
End: 2022-12-01

## 2022-12-01 DIAGNOSIS — M86.60 OTHER CHRONIC OSTEOMYELITIS, UNSPECIFIED SITE: ICD-10-CM

## 2022-12-01 DIAGNOSIS — D64.9 ANEMIA, UNSPECIFIED: ICD-10-CM

## 2022-12-01 LAB
ALBUMIN SERPL ELPH-MCNC: 3.2 G/DL — LOW (ref 3.5–5.2)
ALBUMIN SERPL ELPH-MCNC: 3.3 G/DL — LOW (ref 3.5–5.2)
ALP SERPL-CCNC: 136 U/L — HIGH (ref 30–115)
ALP SERPL-CCNC: 137 U/L — HIGH (ref 30–115)
ALT FLD-CCNC: <5 U/L — SIGNIFICANT CHANGE UP (ref 0–41)
ALT FLD-CCNC: <5 U/L — SIGNIFICANT CHANGE UP (ref 0–41)
ANION GAP SERPL CALC-SCNC: 12 MMOL/L — SIGNIFICANT CHANGE UP (ref 7–14)
AST SERPL-CCNC: 7 U/L — SIGNIFICANT CHANGE UP (ref 0–41)
AST SERPL-CCNC: 7 U/L — SIGNIFICANT CHANGE UP (ref 0–41)
BASOPHILS # BLD AUTO: 0.06 K/UL — SIGNIFICANT CHANGE UP (ref 0–0.2)
BASOPHILS NFR BLD AUTO: 1.1 % — HIGH (ref 0–1)
BILIRUB DIRECT SERPL-MCNC: <0.2 MG/DL — SIGNIFICANT CHANGE UP (ref 0–0.3)
BILIRUB INDIRECT FLD-MCNC: >0.1 MG/DL — LOW (ref 0.2–1.2)
BILIRUB SERPL-MCNC: 0.3 MG/DL — SIGNIFICANT CHANGE UP (ref 0.2–1.2)
BILIRUB SERPL-MCNC: 0.3 MG/DL — SIGNIFICANT CHANGE UP (ref 0.2–1.2)
BUN SERPL-MCNC: 44 MG/DL — HIGH (ref 10–20)
CALCIUM SERPL-MCNC: 8.2 MG/DL — LOW (ref 8.4–10.4)
CHLORIDE SERPL-SCNC: 96 MMOL/L — LOW (ref 98–110)
CO2 SERPL-SCNC: 27 MMOL/L — SIGNIFICANT CHANGE UP (ref 17–32)
CREAT SERPL-MCNC: 4.6 MG/DL — CRITICAL HIGH (ref 0.7–1.5)
EGFR: 13 ML/MIN/1.73M2 — LOW
EOSINOPHIL # BLD AUTO: 0.18 K/UL — SIGNIFICANT CHANGE UP (ref 0–0.7)
EOSINOPHIL NFR BLD AUTO: 3.4 % — SIGNIFICANT CHANGE UP (ref 0–8)
GLUCOSE BLDC GLUCOMTR-MCNC: 159 MG/DL — HIGH (ref 70–99)
GLUCOSE BLDC GLUCOMTR-MCNC: 166 MG/DL — HIGH (ref 70–99)
GLUCOSE BLDC GLUCOMTR-MCNC: 94 MG/DL — SIGNIFICANT CHANGE UP (ref 70–99)
GLUCOSE BLDC GLUCOMTR-MCNC: 99 MG/DL — SIGNIFICANT CHANGE UP (ref 70–99)
GLUCOSE SERPL-MCNC: 87 MG/DL — SIGNIFICANT CHANGE UP (ref 70–99)
HCT VFR BLD CALC: 23.9 % — LOW (ref 42–52)
HGB BLD-MCNC: 7.8 G/DL — LOW (ref 14–18)
IMM GRANULOCYTES NFR BLD AUTO: 0.2 % — SIGNIFICANT CHANGE UP (ref 0.1–0.3)
INR BLD: 1.34 RATIO — HIGH (ref 0.65–1.3)
LYMPHOCYTES # BLD AUTO: 0.87 K/UL — LOW (ref 1.2–3.4)
LYMPHOCYTES # BLD AUTO: 16.7 % — LOW (ref 20.5–51.1)
MAGNESIUM SERPL-MCNC: 1.8 MG/DL — SIGNIFICANT CHANGE UP (ref 1.8–2.4)
MCHC RBC-ENTMCNC: 29.2 PG — SIGNIFICANT CHANGE UP (ref 27–31)
MCHC RBC-ENTMCNC: 32.6 G/DL — SIGNIFICANT CHANGE UP (ref 32–37)
MCV RBC AUTO: 89.5 FL — SIGNIFICANT CHANGE UP (ref 80–94)
MONOCYTES # BLD AUTO: 0.45 K/UL — SIGNIFICANT CHANGE UP (ref 0.1–0.6)
MONOCYTES NFR BLD AUTO: 8.6 % — SIGNIFICANT CHANGE UP (ref 1.7–9.3)
NEUTROPHILS # BLD AUTO: 3.65 K/UL — SIGNIFICANT CHANGE UP (ref 1.4–6.5)
NEUTROPHILS NFR BLD AUTO: 70 % — SIGNIFICANT CHANGE UP (ref 42.2–75.2)
NRBC # BLD: 0 /100 WBCS — SIGNIFICANT CHANGE UP (ref 0–0)
PLATELET # BLD AUTO: 182 K/UL — SIGNIFICANT CHANGE UP (ref 130–400)
POTASSIUM SERPL-MCNC: 4.7 MMOL/L — SIGNIFICANT CHANGE UP (ref 3.5–5)
POTASSIUM SERPL-SCNC: 4.7 MMOL/L — SIGNIFICANT CHANGE UP (ref 3.5–5)
PROT SERPL-MCNC: 6.7 G/DL — SIGNIFICANT CHANGE UP (ref 6–8)
PROT SERPL-MCNC: 6.7 G/DL — SIGNIFICANT CHANGE UP (ref 6–8)
PROTHROM AB SERPL-ACNC: 15.4 SEC — HIGH (ref 9.95–12.87)
RBC # BLD: 2.67 M/UL — LOW (ref 4.7–6.1)
RBC # FLD: 16.3 % — HIGH (ref 11.5–14.5)
SODIUM SERPL-SCNC: 135 MMOL/L — SIGNIFICANT CHANGE UP (ref 135–146)
WBC # BLD: 5.22 K/UL — SIGNIFICANT CHANGE UP (ref 4.8–10.8)
WBC # FLD AUTO: 5.22 K/UL — SIGNIFICANT CHANGE UP (ref 4.8–10.8)

## 2022-12-01 PROCEDURE — 99232 SBSQ HOSP IP/OBS MODERATE 35: CPT

## 2022-12-01 RX ORDER — CEFEPIME 1 G/1
2 INJECTION, POWDER, FOR SOLUTION INTRAMUSCULAR; INTRAVENOUS
Qty: 0 | Refills: 0 | DISCHARGE

## 2022-12-01 RX ORDER — METOPROLOL TARTRATE 50 MG
0.5 TABLET ORAL
Qty: 15 | Refills: 0
Start: 2022-12-01 | End: 2022-12-30

## 2022-12-01 RX ORDER — INSULIN DETEMIR 100/ML (3)
40 INSULIN PEN (ML) SUBCUTANEOUS
Qty: 0 | Refills: 0 | DISCHARGE

## 2022-12-01 RX ORDER — GUAIFENESIN/DEXTROMETHORPHAN 600MG-30MG
5 TABLET, EXTENDED RELEASE 12 HR ORAL
Qty: 0 | Refills: 0 | DISCHARGE
Start: 2022-12-01

## 2022-12-01 RX ORDER — DAPTOMYCIN 500 MG/10ML
900 INJECTION, POWDER, LYOPHILIZED, FOR SOLUTION INTRAVENOUS
Qty: 0 | Refills: 0 | DISCHARGE

## 2022-12-01 RX ADMIN — Medication 1: at 17:12

## 2022-12-01 RX ADMIN — TAMSULOSIN HYDROCHLORIDE 0.4 MILLIGRAM(S): 0.4 CAPSULE ORAL at 22:50

## 2022-12-01 RX ADMIN — Medication 40 MILLIGRAM(S): at 06:00

## 2022-12-01 RX ADMIN — INSULIN GLARGINE 13 UNIT(S): 100 INJECTION, SOLUTION SUBCUTANEOUS at 22:05

## 2022-12-01 RX ADMIN — SEVELAMER CARBONATE 800 MILLIGRAM(S): 2400 POWDER, FOR SUSPENSION ORAL at 08:55

## 2022-12-01 RX ADMIN — SEVELAMER CARBONATE 800 MILLIGRAM(S): 2400 POWDER, FOR SUSPENSION ORAL at 17:15

## 2022-12-01 RX ADMIN — Medication 5 UNIT(S): at 08:53

## 2022-12-01 RX ADMIN — Medication 30 MILLIGRAM(S): at 06:01

## 2022-12-01 RX ADMIN — PREGABALIN 1000 MICROGRAM(S): 225 CAPSULE ORAL at 12:03

## 2022-12-01 RX ADMIN — FAMOTIDINE 20 MILLIGRAM(S): 10 INJECTION INTRAVENOUS at 12:03

## 2022-12-01 RX ADMIN — SEVELAMER CARBONATE 800 MILLIGRAM(S): 2400 POWDER, FOR SUSPENSION ORAL at 12:03

## 2022-12-01 RX ADMIN — Medication 5 MILLILITER(S): at 13:22

## 2022-12-01 RX ADMIN — Medication 5 MILLILITER(S): at 22:50

## 2022-12-01 RX ADMIN — HEPARIN SODIUM 5000 UNIT(S): 5000 INJECTION INTRAVENOUS; SUBCUTANEOUS at 06:00

## 2022-12-01 RX ADMIN — Medication 1 MILLIGRAM(S): at 12:03

## 2022-12-01 RX ADMIN — HEPARIN SODIUM 5000 UNIT(S): 5000 INJECTION INTRAVENOUS; SUBCUTANEOUS at 22:50

## 2022-12-01 RX ADMIN — ATORVASTATIN CALCIUM 40 MILLIGRAM(S): 80 TABLET, FILM COATED ORAL at 22:49

## 2022-12-01 RX ADMIN — CLOPIDOGREL BISULFATE 75 MILLIGRAM(S): 75 TABLET, FILM COATED ORAL at 12:03

## 2022-12-01 RX ADMIN — Medication 5 UNIT(S): at 12:03

## 2022-12-01 RX ADMIN — Medication 81 MILLIGRAM(S): at 12:04

## 2022-12-01 RX ADMIN — Medication 5 MILLILITER(S): at 06:00

## 2022-12-01 RX ADMIN — HEPARIN SODIUM 5000 UNIT(S): 5000 INJECTION INTRAVENOUS; SUBCUTANEOUS at 13:22

## 2022-12-01 RX ADMIN — Medication 12.5 MILLIGRAM(S): at 06:00

## 2022-12-01 NOTE — DISCHARGE NOTE PROVIDER - NPI NUMBER (FOR SYSADMIN USE ONLY) :
[[id number]] [[id number] [[id number]],[[id number]]

## 2022-12-01 NOTE — PROGRESS NOTE ADULT - SUBJECTIVE AND OBJECTIVE BOX
Nephrology progress note    Patient is seen and examined, events over the last 24 h noted .    Allergies:  No Known Allergies    Hospital Medications:   MEDICATIONS  (STANDING):  aspirin enteric coated 81 milliGRAM(s) Oral daily  atorvastatin 40 milliGRAM(s) Oral at bedtime  cefepime   IVPB 2000 milliGRAM(s) IV Intermittent <User Schedule>  clopidogrel Tablet 75 milliGRAM(s) Oral daily  cyanocobalamin 1000 MICROGram(s) Oral daily  darbepoetin Injectable Syringe 60 MICROGram(s) IV Push <User Schedule>  dextrose 5%. 1000 milliLiter(s) (100 mL/Hr) IV Continuous <Continuous>  dextrose 5%. 1000 milliLiter(s) (50 mL/Hr) IV Continuous <Continuous>  dextrose 50% Injectable 25 Gram(s) IV Push once  dextrose 50% Injectable 12.5 Gram(s) IV Push once  dextrose 50% Injectable 25 Gram(s) IV Push once  famotidine    Tablet 20 milliGRAM(s) Oral daily  folic acid 1 milliGRAM(s) Oral daily  furosemide    Tablet 40 milliGRAM(s) Oral daily  glucagon  Injectable 1 milliGRAM(s) IntraMuscular once  guaifenesin/dextromethorphan Oral Liquid 5 milliLiter(s) Oral three times a day  heparin   Injectable 5000 Unit(s) SubCutaneous every 8 hours  influenza   Vaccine 0.5 milliLiter(s) IntraMuscular once  insulin glargine Injectable (LANTUS) 7 Unit(s) SubCutaneous at bedtime  insulin glargine Injectable (LANTUS) 13 Unit(s) SubCutaneous at bedtime  insulin lispro (ADMELOG) corrective regimen sliding scale   SubCutaneous three times a day before meals  insulin lispro Injectable (ADMELOG) 5 Unit(s) SubCutaneous before breakfast  insulin lispro Injectable (ADMELOG) 5 Unit(s) SubCutaneous before lunch  metoprolol tartrate 12.5 milliGRAM(s) Oral every 12 hours  NIFEdipine XL 30 milliGRAM(s) Oral daily  regadenoson Injectable 0.4 milliGRAM(s) IV Push once  senna 2 Tablet(s) Oral at bedtime  sevelamer carbonate 800 milliGRAM(s) Oral three times a day with meals  tamsulosin 0.4 milliGRAM(s) Oral at bedtime        VITALS:  T(F): 97.9 (12-01-22 @ 08:14), Max: 98 (11-30-22 @ 23:54)  HR: 83 (12-01-22 @ 08:14)  BP: 150/80 (12-01-22 @ 08:14)  RR: 19 (12-01-22 @ 04:01)  SpO2: --  Wt(kg): --    11-30 @ 07:01  -  12-01 @ 07:00  --------------------------------------------------------  IN: 250 mL / OUT: 3000 mL / NET: -2750 mL          PHYSICAL EXAM:  Constitutional: NAD  HEENT: anicteric sclera  Neck: No JVD  Respiratory: CTA  Cardiovascular: S1, S2, RRR  Gastrointestinal: BS+, soft, NT/ND  Extremities: mild peripheral edema  Neurological: rsting in bed  : No CVA tenderness. No schneider.   Skin: No rashes  Vascular Access: AVF    LABS:  12-01    135  |  96<L>  |  44<H>  ----------------------------<  87  4.7   |  27  |  4.6<HH>    Ca    8.2<L>      01 Dec 2022 07:42  Mg     1.8     12-01    TPro  6.7  /  Alb  3.2<L>  /  TBili  0.3  /  DBili  <0.2  /  AST  7   /  ALT  <5  /  AlkPhos  136<H>  12-01                          7.8    5.22  )-----------( 182      ( 01 Dec 2022 07:42 )             23.9       Urine Studies:      RADIOLOGY & ADDITIONAL STUDIES:

## 2022-12-01 NOTE — DISCHARGE NOTE PROVIDER - NSDCCPCAREPLAN_GEN_ALL_CORE_FT
PRINCIPAL DISCHARGE DIAGNOSIS  Diagnosis: Osteomyelitis of ankle or foot, acute  Assessment and Plan of Treatment: You were admitted and managed here for the osteomylitis of your left foot bone.  Osteomyelitis is an infection in a bone. Infections can reach a bone by traveling through the bloodstream or spreading from nearby tissue. Infections can also begin in the bone itself if an injury exposes the bone to germs.  Smokers and people with chronic health conditions, such as diabetes or kidney failure, are more at risk of developing osteomyelitis. People who have diabetes may develop osteomyelitis in their feet if they have foot ulcers.  Although once considered incurable, osteomyelitis can now be successfully treated. Most people need surgery to remove areas of the bone that have . After surgery, strong intravenous antibiotics are typically needed.  Your bones are normally resistant to infection, but this protection lessens as you get older. Other factors that can make your bones more vulnerable to osteomyelitis may include:  Recent injury or orthopedic surgery  Poorly controlled diabetes  Peripheral artery disease, often related to smoking  Sickle cell disease  If you've been told that you have an increased risk of infection, talk to your doctor about ways to prevent infections from  Reducing your risk of infection will also help your risk of developing osteomyelitis.  In general, take precautions to avoid cuts, scrapes and animal scratches or bites, which give germs easy access to your body. If you or your child has a minor injury, clean the area immediately and apply a clean bandage. Check wounds frequently for signs of infection.  The most common treatments for osteomyelitis are surgery to remove portions of bone that are infected or dead, followed by intravenous antibiotics given in the hospital.  Call 911 and return to the emergency room if you have chest pain, difficulty breathing, high fevers, worsening of your symptoms, feel unwell or have nausea and vomiting.   Please take your medications as prescribed and follow the instructions given to you regarding your      SECONDARY DISCHARGE DIAGNOSES  Diagnosis: Chest pain  Assessment and Plan of Treatment: Chest Pain  Chest pain can be caused by many different conditions which may or may not be dangerous. Causes include heartburn, lung infections, heart attack, blood clot in lungs, skin infections, strain or damage to muscle, cartilage, or bones, etc. In addition to a history and physical examination, an electrocardiogram (ECG) or other lab tests may have been performed to determine the cause of your chest pain. Follow up with your primary care provider or with a cardiologist as instructed.   SEEK IMMEDIATE MEDICAL CARE IF YOU HAVE ANY OF THE FOLLOWING SYMPTOMS: worsening chest pain, coughing up blood, unexplained back/neck/jaw pain, severe abdominal pain, dizziness or lightheadedness, fainting, shortness of breath, sweaty or clammy skin, vomiting, or racing heart beat. These symptoms may represent a serious problem that is an emergency. Do not wait to see if the symptoms will go away. Get medical help right away. Call 911 and do not drive yourself to the hospital.

## 2022-12-01 NOTE — DISCHARGE NOTE PROVIDER - NSDCMRMEDTOKEN_GEN_ALL_CORE_FT
aspirin 81 mg oral tablet: 1 tab(s) orally once a day  atorvastatin 40 mg oral tablet: 1 tab(s) orally once a day   cefepime 2 g intravenous injection: 2 granules intravenous Monday, Wednesday, and Friday post dialysis  DAPTOmycin 500 mg intravenous injection: 900 milligram(s) intravenous Monday, Wednesday, and Friday post dialysis  furosemide 40 mg oral tablet: 1 tab(s) orally once a day  Levemir 100 units/mL subcutaneous solution: 40 unit(s) subcutaneous once a day (at bedtime)  NIFEdipine 30 mg oral tablet, extended release: 1 tab(s) orally once a day  nitroglycerin 0.4 mg sublingual spray: 1 tab(s) sublingual every 5 minutes, As Needed up to 3 tabs  Plavix 75 mg oral tablet: 1 tab(s) orally once a day  Senna 8.6 mg oral tablet: 2 tab(s) orally once a day (at bedtime)  sevelamer carbonate 800 mg oral tablet: 1 tab(s) orally 3 times a day (with meals)  tamsulosin 0.4 mg oral capsule: 1 cap(s) orally once a day (at bedtime)  Trulicity Pen 1.5 mg/0.5 mL subcutaneous solution: 1.5  subcutaneous once a week  Tylenol 325 mg oral tablet: 2 tab(s) orally every 6 hours, As Needed   aspirin 81 mg oral tablet: 1 tab(s) orally once a day  atorvastatin 40 mg oral tablet: 1 tab(s) orally once a day   furosemide 40 mg oral tablet: 1 tab(s) orally once a day  guaifenesin-dextromethorphan 100 mg-10 mg/5 mL oral liquid: 5 milliliter(s) orally 3 times a day  Levemir 100 units/mL subcutaneous solution: 13 unit(s) subcutaneous once a day (at bedtime)  metoprolol succinate 25 mg oral capsule, extended release: 0.5 cap(s) orally once a day   NIFEdipine 30 mg oral tablet, extended release: 1 tab(s) orally once a day  nitroglycerin 0.4 mg sublingual spray: 1 tab(s) sublingual every 5 minutes, As Needed up to 3 tabs  Plavix 75 mg oral tablet: 1 tab(s) orally once a day  Senna 8.6 mg oral tablet: 2 tab(s) orally once a day (at bedtime)  sevelamer carbonate 800 mg oral tablet: 1 tab(s) orally 3 times a day (with meals)  tamsulosin 0.4 mg oral capsule: 1 cap(s) orally once a day (at bedtime)  Trulicity Pen 1.5 mg/0.5 mL subcutaneous solution: 1.5  subcutaneous once a week  Tylenol 325 mg oral tablet: 2 tab(s) orally every 6 hours, As Needed   aspirin 81 mg oral tablet: 1 tab(s) orally once a day  atorvastatin 40 mg oral tablet: 1 tab(s) orally once a day   cyanocobalamin 1000 mcg oral tablet: 1 tab(s) orally once a day  folic acid 1 mg oral tablet: 1 tab(s) orally once a day  furosemide 40 mg oral tablet: 1 tab(s) orally once a day  gentamicin 100 mg/50 mL-NaCl 0.9% intravenous solution: 35 milliliter(s) intravenous 3 times a week post HD    guaifenesin-dextromethorphan 100 mg-10 mg/5 mL oral liquid: 5 milliliter(s) orally 3 times a day  Levemir 100 units/mL subcutaneous solution: 10 unit(s) subcutaneous once a day (at bedtime)  metoprolol succinate 25 mg oral capsule, extended release: 0.5 cap(s) orally once a day   NIFEdipine 30 mg oral tablet, extended release: 1 tab(s) orally once a day  nitroglycerin 0.4 mg sublingual spray: 1 tab(s) sublingual every 5 minutes, As Needed up to 3 tabs  ocular lubricant ophthalmic solution: 3 drop(s) to each affected eye every 2 hours, As needed, Dry Eyes  pantoprazole 40 mg oral delayed release tablet: 1 tab(s) orally once a day (before a meal)  Plavix 75 mg oral tablet: 1 tab(s) orally once a day  polyethylene glycol 3350 oral powder for reconstitution: 17 gram(s) orally once a day  Senna 8.6 mg oral tablet: 2 tab(s) orally once a day (at bedtime)  sevelamer carbonate 800 mg oral tablet: 1 tab(s) orally 3 times a day (with meals)  tamsulosin 0.4 mg oral capsule: 1 cap(s) orally once a day (at bedtime)  Trulicity Pen 1.5 mg/0.5 mL subcutaneous solution: 1.5  subcutaneous once a week  Tylenol 325 mg oral tablet: 2 tab(s) orally every 6 hours, As Needed   aspirin 81 mg oral tablet: 1 tab(s) orally once a day  atorvastatin 40 mg oral tablet: 1 tab(s) orally once a day   cyanocobalamin 1000 mcg oral tablet: 1 tab(s) orally once a day  folic acid 1 mg oral tablet: 1 tab(s) orally once a day  furosemide 40 mg oral tablet: 1 tab(s) orally once a day  gentamicin 80 mg/50 mL-NaCl 0.9% intravenous solution: 80 milligram(s) intravenous 3 times a week post HD until 1/1/2023  guaifenesin-dextromethorphan 100 mg-10 mg/5 mL oral liquid: 5 milliliter(s) orally 3 times a day  insulin lispro 100 units/mL injectable solution: 5 unit(s) injectable 2 times a day give before breakfast and before lunch  Levemir 100 units/mL subcutaneous solution: 10 unit(s) subcutaneous once a day (at bedtime)  metoprolol succinate 25 mg oral capsule, extended release: 0.5 cap(s) orally once a day   NIFEdipine 30 mg oral tablet, extended release: 1 tab(s) orally once a day  nitroglycerin 0.4 mg sublingual spray: 1 tab(s) sublingual every 5 minutes, As Needed up to 3 tabs  ocular lubricant ophthalmic solution: 3 drop(s) to each affected eye every 2 hours, As needed, Dry Eyes  pantoprazole 40 mg oral delayed release tablet: 1 tab(s) orally once a day (before a meal)  Plavix 75 mg oral tablet: 1 tab(s) orally once a day  polyethylene glycol 3350 oral powder for reconstitution: 17 gram(s) orally once a day  Senna 8.6 mg oral tablet: 2 tab(s) orally once a day (at bedtime)  sevelamer carbonate 800 mg oral tablet: 1 tab(s) orally 3 times a day (with meals)  tamsulosin 0.4 mg oral capsule: 1 cap(s) orally once a day (at bedtime)  Tylenol 325 mg oral tablet: 2 tab(s) orally every 6 hours, As Needed

## 2022-12-01 NOTE — DISCHARGE NOTE PROVIDER - HOSPITAL COURSE
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 and cefepime post dialysis s.p wound Vac, chronic anemia, HFmrEF, presented to the hospital for chest pain.     #ACS/CAD patient presenting with Chest manuela>>>>Resolved   #CAD s/p PCI/CABG  #PAD s/p angioplasty  #HTN/HLD  #Chronic HFmrEF  - Troponin 0.70 in setting of ESRD, 0.25 back in 09/2022  - Troponin stable between 0.7-0.8  - TTE LVEF 45-50%, Grade 3 diastolic dysfunction  - Nuclear stress test: FIXED PERFUSION DEFECT INVOLVING ANTERIOR WALL OF THE LEFT VENTRICLE EXTENDING TO APEX CONSISTENT WITH SCAR. GLOBAL HYPOKINESIS WITH POOR THICKENING OF THE ANTERIOR WALL. EF  36 %   - c/w ASA 81 mg daily  - c/w Plavix 75 mg daily  - c/w atorvastatin 40 mg daily  - c/w Nifedipine 30 mg daily  - EKG: Left anterior fascicular block ST & T wave abnormality, consider lateral ischemia. Prolonged QT    #Recent hx Left calcaneal OM   - Wound Cx 9/16 - Enterobacter cloacae  - wound CX 10/16 VRE Faecium Enterobacter cloacae complex, Proteus vulgaris, Morganella morganii   - s/p debridement 10/21 Wound Cx Proteus mirabilis, VRE faecium, Pseudomonas putida  - s/p debridement 10/24 Crumble L calcaneous   - s/p debridement 11/3 with wound vac in place  - Cefepime 2g post HD through 11/30; s/p 3 session of daptomycin post-HD  - On last admission, ID plan for 4 weeks from last debridement (11/3-11/30)  -Most recent Debridement for 11/29/30----Procedure performed. Podiatry will follow out pt      #Covid+  -pt tested + for covid on 11/26   -started on remdesavir  -isolation protocol    #NSVT  -11/16 PM 10 beats of NSVT observed on tele   -Started Metoprolol 12.5 BID    # Anemia of chronic disease  - hb on admission 6.6 baseline 7.6. s/p 1U PRBC  -11/21 pt got 1 unit pRBCs   - Monitor CBC, transfuse to keep >8  -maintain active T&S     # ESRD on HD   - HD per nephrology  - B/L pitting edema, little urine output       # PAD  - s/p angiogram LLE with peroneal artery angioplasty and arterectomy 10/27  - Cont meds as above    # Type 2 DM  -Continue insulin regimen    # BPH  - c/w Tamsulosin 0.4 mg daily          Patient medically stable for discharge.        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 and cefepime post dialysis s.p wound Vac, chronic anemia, HFmrEF, presented to the hospital for chest pain     1. Chest pain. hx of CAD s/p PCI/CABG and PAD s/p angioplasty  ACS/CAD presenting with Chest pain resolved   - Admit to medicine  - Cont aspirin and plavix   - Cont statin 40mg HS   - Cont Nifedipine 30mg po daily   - Troponin stable between 0.7-0.8  NST: FIXED PERFUSION DEFECT INVOLVING ANTERIOR WALL OF THE LEFT VENTRICLE EXTENDING TO APEX CONSISTENT WITH SCAR. GLOBAL HYPOKINESIS WITH POOR THICKENING OF THE ANTERIOR WALL. EF  36 %       2. NSVT  -10 beats of NSVT observed on tele on 11/16 PM  -Started Metoprolol 12.5 BID  -Maintain K>4 and Mg>2    3. Anemia of chronic disease s/p multiple PRBC  - Monitor CBC, transfuse to keep >8    4.  ESRD on HD   - HD per nephrology  - B/L pitting edema, little urine output    5. Recent hx Left calcaneal OM   - Wound Cx 9/16 - Enterobacter cloacae  - wound CX 10/16 VRE Faecium Enterobacter cloacae complex, Proteus vulgaris, Morganella morganii   - s/p debridement 10/21 Wound Cx Proteus mirabilis, VRE faecium, Pseudomonas putida  - s/p debridement 10/24 Crumble L calcaneous   - s/p debridement 11/3 with wound vac in place  - Finished cefepime post HD on 11/30/2022 as per ID. need follow up with ID   -Debridement done on 11/29/2022.   - Podiatry consult appreciated     6.  PAD  - s/p angiogram LLE with peroneal artery angioplasty and arterectomy 10/27  - Cont meds as above    7. Type 2 DM  - Adjusted insulin due to hypoglycemia in am  - Lantus was decreased from 20u to 13unit     8.  BPH  - c/w Tamsulosin 0.4 mg daily    9. COVID 19 asymptomatic   - Finished a 3 day course of remdisivir              63 yo male, h/o type 2 DM on insulin, CAD s.p stent 2008, s/p CABG 2012 (Dr Smith), PAD s/p angioplasty and stent b/l LE, BPH, ESRD on HD through left AVF (MWF follows with Dr MARY Paula), DFU sp left toe amputation, left calcaneal OM on daptomycin and cefepime post dialysis s.p wound Vac, chronic anemia, HFmrEF, presented to the hospital for chest pain     1. Chest pain. hx of CAD s/p PCI/CABG and PAD s/p angioplasty  ACS/CAD presenting with Chest pain resolved   - Admit to medicine  - Cont aspirin and plavix   - Cont statin 40mg HS   - Cont Nifedipine 30mg po daily   - Troponin stable between 0.7-0.8  NST: FIXED PERFUSION DEFECT INVOLVING ANTERIOR WALL OF THE LEFT VENTRICLE EXTENDING TO APEX CONSISTENT WITH SCAR. GLOBAL HYPOKINESIS WITH POOR THICKENING OF THE ANTERIOR WALL. EF  36 %       2. NSVT  -10 beats of NSVT observed on tele on 11/16 PM  -Started Metoprolol 12.5 BID. DC on metoprolol succinate 12.5 mg po daily   -Maintain K>4 and Mg>2    3. Anemia of chronic disease s/p multiple PRBC  - Monitor CBC, transfuse to keep >8    4.  ESRD on HD   - HD per nephrology  - B/L pitting edema, little urine output    5. Recent hx Left calcaneal OM   - Wound Cx 9/16 - Enterobacter cloacae  - wound CX 10/16 VRE Faecium Enterobacter cloacae complex, Proteus vulgaris, Morganella morganii   - s/p debridement 10/21 Wound Cx Proteus mirabilis, VRE faecium, Pseudomonas putida  - s/p debridement 10/24 Crumble L calcaneous   - s/p debridement 11/3 with wound vac in place  - Finished cefepime post HD on 11/30/2022 as per ID. need follow up with ID   -Debridement done on 11/29/2022.   - Podiatry consult appreciated     6.  PAD  - s/p angiogram LLE with peroneal artery angioplasty and arterectomy 10/27  - Cont meds as above    7. Type 2 DM  - Adjusted insulin due to hypoglycemia in am  - Lantus was decreased from 20u to 7unit     8.  BPH  - c/w Tamsulosin 0.4 mg daily    9. COVID 19 asymptomatic   - Finished a 3 day course of remdisivir              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 and cefepime post dialysis s.p wound Vac, chronic anemia, HFmrEF, presented to the hospital for chest pain     1. Chest pain. hx of CAD s/p PCI/CABG and PAD s/p angioplasty  ACS/CAD presenting with Chest pain resolved   - Admit to medicine  - Cont aspirin and plavix   - Cont statin 40mg HS   - Cont Nifedipine 30mg po daily   - Troponin stable between 0.7-0.8  NST: FIXED PERFUSION DEFECT INVOLVING ANTERIOR WALL OF THE LEFT VENTRICLE EXTENDING TO APEX CONSISTENT WITH SCAR. GLOBAL HYPOKINESIS WITH POOR THICKENING OF THE ANTERIOR WALL. EF  36 %       2. NSVT  -10 beats of NSVT observed on tele on 11/16 PM  -Started Metoprolol 12.5 BID. DC on metoprolol succinate 12.5 mg po daily   -Maintain K>4 and Mg>2    3. Anemia of chronic disease s/p multiple PRBC  - Monitor CBC, transfuse to keep >8    4.  ESRD on HD   - HD per nephrology  - B/L pitting edema, little urine output    5. Recent hx Left calcaneal OM   - Wound Cx 9/16 - Enterobacter cloacae  - wound CX 10/16 VRE Faecium Enterobacter cloacae complex, Proteus vulgaris, Morganella morganii   - s/p debridement 10/21 Wound Cx Proteus mirabilis, VRE faecium, Pseudomonas putida  - s/p debridement 10/24 Crumble L calcaneous   - s/p debridement 11/3 with wound vac in place  - Finished cefepime post HD on 11/30/2022 as per ID. need follow up with ID   -Debridement done on 11/29/2022.   -ID notes 12/02/22>>>> RECOMMENDATIONS- as ESBL growing, can switch to Gentamicin 160 mg x 1 for load, followed by 80 mg (1 mg/kg Adjusted BW) post HD. Plan at least  4 weeks from debridement date ( 29/11/22) given evidence of calcaneal infection in OR. will need Gentamicin level drawn pre HD to ensure levels < 2. Weekly CBC, CMP, ESR/CRP. ID follow-up with Dr. Gomez Mathur for Telehealth. We will call the patient between 10:30-1:[id number] [facility] [phone]     6.  PAD  - s/p angiogram LLE with peroneal artery angioplasty and arterectomy 10/27  - Cont meds as above    7. Type 2 DM  - Adjusted insulin due to hypoglycemia in am  - Lantus was decreased from 20u to 7unit     8.  BPH  - c/w Tamsulosin 0.4 mg daily    9. COVID 19 asymptomatic   - Finished a 3 day course of remdisivir              63 yo male, h/o type 2 DM on insulin, CAD s.p stent 2008, s/p CABG 2012 (Dr Smith), PAD s/p angioplasty and stent b/l LE, BPH, ESRD on HD through left AVF (MWF follows with Dr MARY Paula), DFU sp left toe amputation, left calcaneal OM on daptomycin and cefepime post dialysis s.p wound Vac, chronic anemia, HFmrEF, presented to the hospital for chest pain   In the hospital, patient was treated for the following conditions:     1. Chest pain. hx of CAD s/p PCI/CABG and PAD s/p angioplasty  ACS/CAD presenting with Chest pain  - Admit to medicine  - Cont aspirin and plavix   - Cont statin 40mg HS   - Cont Nifedipine 30mg po daily   - Troponin stable between 0.7-0.8  NST: FIXED PERFUSION DEFECT INVOLVING ANTERIOR WALL OF THE LEFT VENTRICLE EXTENDING TO APEX CONSISTENT WITH SCAR. GLOBAL HYPOKINESIS WITH POOR THICKENING OF THE ANTERIOR WALL. EF  36 %     2. NSVT  -10 beats of NSVT observed on tele on 11/16 PM  - Started on  Metoprolol 12.5 BID. DC on metoprolol 12.5mg po bid     3. Anemia of chronic disease s/p multiple PRBC  - Monitor CBC, transfuse to keep >8    4.  ESRD on HD   - HD per nephrology  - B/L pitting edema, little urine output    5. Recent hx Left calcaneal OM   - Wound Cx 9/16 - Enterobacter cloacae  - wound CX 10/16 VRE Faecium Enterobacter cloacae complex, Proteus vulgaris, Morganella morganii   - last wound cx 11/29/2022:E.coli and proteus mirabilis   - s/p debridement 10/21 Wound Cx Proteus mirabilis, VRE faecium, Pseudomonas putida  - s/p debridement 10/24 Crumble L calcaneous   - s/p debridement 11/3 with wound vac in place  - Cefepime 2g post HD through 11/30; s/p 3 session of daptomycin post-HD  - On last admission, ID plan for 4 weeks from last debridement (11/3-11/30)  -Debridement done on 11/29/2022.   - ID consult:  a) Need 4 weeks of abs from last debridement. Dc on gentamicin 160mg post HD*1 followed by maintenance 70mg post hd for 4 weeks   * Need weekly gentamicin level to be reported to his Nephrologist     - Podiatry consult appreciated     6.  PAD  - s/p angiogram LLE with peroneal artery angioplasty and arterectomy 10/27  - Cont meds as above    7. Type 2 DM  - Adjusted insulin due to hypoglycemia in am  - Lantus dose was decreased     8.  BPH  - c/w Tamsulosin 0.4 mg daily    9. COVID 19 asymptomatic   - Finished a 3 day course of remdisivir     10. Eye itching  - Tear drops       Patient feels well. he has no complains. no overnight events. he's agreeable with dc planning    he will need to fu with PMD for regular check up     he will need to fu with Podiatry post debridement    He will need to fu with Nephrology for ESKD            63 yo male, h/o type 2 DM on insulin, CAD s.p stent 2008, s/p CABG 2012 (Dr Smith), PAD s/p angioplasty and stent b/l LE, BPH, ESRD on HD through left AVF (MWF follows with Dr MARY Paula), DFU sp left toe amputation, left calcaneal OM on daptomycin and cefepime post dialysis s.p wound Vac, chronic anemia, HFmrEF, presented to the hospital for chest pain   In the hospital, patient was treated for the following conditions:     # Chest pain  # CAD s/p PCI/CABG   # PAD s/p angioplasty    - Cont aspirin, statin, plavix   - Cont Nifedipine 30mg po daily   - NST: FIXED PERFUSION DEFECT INVOLVING ANTERIOR WALL OF THE LEFT VENTRICLE EXTENDING TO APEX CONSISTENT WITH SCAR. GLOBAL HYPOKINESIS WITH POOR THICKENING OF THE ANTERIOR WALL. EF  36 %   - outpatient cardio follow up     # Anemia of chronic disease s/p multiple PRBC  - Monitor CBC, transfuse to keep >8  - transfused one unit in HD on 12/7    # ESRD on HD   - HD per nephrology    # Recent hx Left calcaneal OM   - Wound Cx 9/16 - Enterobacter cloacae  - wound CX 10/16 VRE Faecium Enterobacter cloacae complex, Proteus vulgaris, Morganella morganii   - last wound cx 11/29/2022:E.coli and proteus mirabilis   - s/p debridement 10/21 Wound Cx Proteus mirabilis, VRE faecium, Pseudomonas putida  - s/p debridement 10/24 Crumble L calcaneous   - s/p debridement 11/3 with wound vac in place  - Cefepime 2g post HD through 11/30; s/p 3 session of daptomycin post-HD  - On last admission, ID plan for 4 weeks from last debridement (11/3-11/30)  - Debridement done on 11/29/2022.   - ID following - Need 4 weeks of abs from last debridement. Dc on gentamicin 160mg post HD*1 followed by maintenance 70mg post hd for 4 weeks    - need to send weekly level for nephrology to adjust gentamicin dose (it was changed from 80mg to 70mg)   - Podiatry consult appreciated  - wound grew esbl- needs private room - will be available today at [facility]    # PAD  - s/p angiogram LLE with peroneal artery angioplasty and arterectomy 10/27  - outpatient vascular follow up   - cont current meds     # DM II   - well controlled on current regimen     # BPH  - Tamsulosin     # COVID   - Finished a 3 day course of remdisivir       Patient feels well. he has no complains. no overnight events. he's agreeable with dc planning    he will need to fu with PMD for regular check up     he will need to fu with Podiatry post debridement    He will need to fu with Nephrology for ESKD

## 2022-12-01 NOTE — PROGRESS NOTE ADULT - ASSESSMENT
63 yo male, h/o type 2 DM on insulin, CAD s.p stent 2008, s/p CABG 2012 (Dr Smith), PAD s/p angioplasty and stent b/l LE, BPH, ESRD on HD through left AVF (MWF follows with Dr MARY Paula), DFU sp left toe amputation, left calcaneal OM on daptomycin and cefepime post dialysis s.p wound Vac, chronic anemia, HFmrEF, presented to the hospital for chest pain. Found to have anemia s/p 1 unit of prbc.    ESRD on HD/ Anemia / Chest pain/ OM on abx  HD due tomorrow as OP 3H uf 3L as tolerated   RAMSEY weekly post HD 60 mcg/ transfuse if Hb< 7  on sevelamer 800 mg po tid/ phos at goal /   on lasix non oliguric    BP controlled  abx for OM - Cefepime  s/p debridement with podiatry 11/21 and 11/29 has calcaneal OM on cefepime for now     will follow

## 2022-12-01 NOTE — DISCHARGE NOTE PROVIDER - PROVIDER TOKENS
PROVIDER:[TOKEN:[[id number]],FOLLOWUP:[1 week]],PROVIDER:[TOKEN:[[id number]],FOLLOWUP:[1 month]],PROVIDER:[TOKEN:[[id number]],FOLLOWUP:[2 weeks]] PROVIDER:[TOKEN:[[id number]],FOLLOWUP:[1 week]],PROVIDER:[TOKEN:[[id number]],FOLLOWUP:[1 month]],PROVIDER:[TOKEN:[[id number]],SCHEDULEDAPPT:[12/06/2022]] PROVIDER:[TOKEN:[[id number]],FOLLOWUP:[1 week]],PROVIDER:[TOKEN:[[id number]],FOLLOWUP:[1 week]],PROVIDER:[TOKEN:[[id number]],FOLLOWUP:[1 week]],FREE:[LAST:[cardiologist],PHONE:[(   )    -],FAX:[(   )    -],FOLLOWUP:[1 week]]

## 2022-12-01 NOTE — PROGRESS NOTE ADULT - SUBJECTIVE AND OBJECTIVE BOX
SCHWABACHER, LAWRENCE  62y  [facility] [id number] B      Patient is a 62y old  Male who presents with a chief complaint of Chest pain (30 Nov 2022 13:29)      INTERVAL HPI/OVERNIGHT EVENTS:  Patient feels well. he has no complains.agreeable with dc       REVIEW OF SYSTEMS:        FAMILY HISTORY:  Family history of heart disease (Father)    DM (diabetes mellitus) (Mother)    ESRD (end stage renal disease) on dialysis (Mother)      T(C): 36.2 (11-30-22 @ 15:35), Max: 36.4 (11-29-22 @ 19:19)  HR: 88 (11-30-22 @ 15:35) (71 - 98)  BP: 133/60 (11-30-22 @ 15:35) (123/64 - 163/83)  RR: 20 (11-30-22 @ 15:35) (16 - 22)  SpO2: 95% (11-29-22 @ 20:00) (95% - 100%)  Wt(kg): --Vital Signs Last 24 Hrs  T(C): 36.2 (30 Nov 2022 15:35), Max: 36.4 (29 Nov 2022 19:19)  T(F): 97.2 (30 Nov 2022 15:35), Max: 97.5 (29 Nov 2022 19:19)  HR: 88 (30 Nov 2022 15:35) (71 - 98)  BP: 133/60 (30 Nov 2022 15:35) (123/64 - 163/83)  BP(mean): 104 (29 Nov 2022 17:20) (104 - 104)  RR: 20 (30 Nov 2022 15:35) (16 - 22)  SpO2: 95% (29 Nov 2022 20:00) (95% - 100%)    Parameters below as of 30 Nov 2022 11:15  Patient On (Oxygen Delivery Method): room air        PHYSICAL EXAM:  GENERAL: NAD, well-groomed, well-developed  NERVOUS SYSTEM:  Alert & Oriented X3, Good concentration;  PULM: Clear to auscultation bilaterally  CARDIAC: Regular rate and rhythm  GI: Soft, Nontender, Nondistended; Bowel sounds present  EXTREMITIES:  left foot wrapper     Consultant(s) Notes Reviewed:  [x ] YES  [ ] NO  Care Discussed with Consultants/Other Providers [ x] YES  [ ] NO    LABS:                            7.3    4.70  )-----------( 180      ( 30 Nov 2022 09:36 )             21.4   11-30    137  |  98  |  43<H>  ----------------------------<  154<H>  4.4   |  26  |  4.6<HH>    Ca    8.0<L>      30 Nov 2022 09:36  Mg     1.9     11-30    TPro  6.9  /  Alb  3.4<L>  /  TBili  0.2  /  DBili  <0.2  /  AST  6   /  ALT  <5  /  AlkPhos  141<H>  11-30            acetaminophen     Tablet .. 650 milliGRAM(s) Oral every 6 hours PRN  aspirin enteric coated 81 milliGRAM(s) Oral daily  atorvastatin 40 milliGRAM(s) Oral at bedtime  cefepime   IVPB 2000 milliGRAM(s) IV Intermittent <User Schedule>  clopidogrel Tablet 75 milliGRAM(s) Oral daily  cyanocobalamin 1000 MICROGram(s) Oral daily  darbepoetin Injectable Syringe 60 MICROGram(s) IV Push <User Schedule>  dextrose 5%. 1000 milliLiter(s) IV Continuous <Continuous>  dextrose 5%. 1000 milliLiter(s) IV Continuous <Continuous>  dextrose 50% Injectable 25 Gram(s) IV Push once  dextrose 50% Injectable 12.5 Gram(s) IV Push once  dextrose 50% Injectable 25 Gram(s) IV Push once  dextrose Oral Gel 15 Gram(s) Oral once PRN  famotidine    Tablet 20 milliGRAM(s) Oral daily  folic acid 1 milliGRAM(s) Oral daily  furosemide    Tablet 40 milliGRAM(s) Oral daily  glucagon  Injectable 1 milliGRAM(s) IntraMuscular once  guaifenesin/dextromethorphan Oral Liquid 5 milliLiter(s) Oral three times a day  heparin   Injectable 5000 Unit(s) SubCutaneous every 8 hours  influenza   Vaccine 0.5 milliLiter(s) IntraMuscular once  insulin glargine Injectable (LANTUS) 7 Unit(s) SubCutaneous at bedtime  insulin glargine Injectable (LANTUS) 13 Unit(s) SubCutaneous at bedtime  insulin lispro (ADMELOG) corrective regimen sliding scale   SubCutaneous three times a day before meals  insulin lispro Injectable (ADMELOG) 5 Unit(s) SubCutaneous before breakfast  insulin lispro Injectable (ADMELOG) 5 Unit(s) SubCutaneous before lunch  melatonin 3 milliGRAM(s) Oral at bedtime PRN  metoprolol tartrate 12.5 milliGRAM(s) Oral every 12 hours  NIFEdipine XL 30 milliGRAM(s) Oral daily  regadenoson Injectable 0.4 milliGRAM(s) IV Push once  remdesivir  IVPB 100 milliGRAM(s) IV Intermittent every 24 hours  remdesivir  IVPB   IV Intermittent   senna 2 Tablet(s) Oral at bedtime  sevelamer carbonate 800 milliGRAM(s) Oral three times a day with meals  tamsulosin 0.4 milliGRAM(s) Oral at bedtime    63 yo male, h/o type 2 DM on insulin, CAD s.p stent 2008, s/p CABG 2012 (Dr Smith), PAD s/p angioplasty and stent b/l LE, BPH, ESRD on HD through left AVF (MWF follows with Dr MARY Paula), DFU sp left toe amputation, left calcaneal OM on daptomycin and cefepime post dialysis s.p wound Vac, chronic anemia, HFmrEF, presented to the hospital for chest pain     1. Chest pain. hx of CAD s/p PCI/CABG and PAD s/p angioplasty  ACS/CAD presenting with Chest pain  - Admit to medicine  - Cont aspirin and plavix   - Cont statin 40mg HS   - Cont Nifedipine 30mg po daily   - Troponin stable between 0.7-0.8  NST: FIXED PERFUSION DEFECT INVOLVING ANTERIOR WALL OF THE LEFT VENTRICLE EXTENDING TO APEX CONSISTENT WITH SCAR. GLOBAL HYPOKINESIS WITH POOR THICKENING OF THE ANTERIOR WALL. EF  36 %       2. NSVT  -10 beats of NSVT observed on tele on 11/16 PM  -Started Metoprolol 12.5 BID  -Maintain K>4 and Mg>2    3. Anemia of chronic disease s/p multiple PRBC  - Monitor CBC, transfuse to keep >8    4.  ESRD on HD   - HD per nephrology  - B/L pitting edema, little urine output    5. Recent hx Left calcaneal OM   - Wound Cx 9/16 - Enterobacter cloacae  - wound CX 10/16 VRE Faecium Enterobacter cloacae complex, Proteus vulgaris, Morganella morganii   - s/p debridement 10/21 Wound Cx Proteus mirabilis, VRE faecium, Pseudomonas putida  - s/p debridement 10/24 Crumble L calcaneous   - s/p debridement 11/3 with wound vac in place  - Cefepime 2g post HD through 11/30; s/p 3 session of daptomycin post-HD  - On last admission, ID plan for 4 weeks from last debridement (11/3-11/30)  -Debridement done on 11/29/2022.   - Podiatry consult appreciated     6.  PAD  - s/p angiogram LLE with peroneal artery angioplasty and arterectomy 10/27  - Cont meds as above    7. Type 2 DM  - Adjusted insulin due to hypoglycemia in am  - Lantus was decreased from 20u to 13unit     8.  BPH  - c/w Tamsulosin 0.4 mg daily    9. COVID 19 asymptomatic   - Finished a 3 day course of remdisivir     Pending: Podiatry procedure  -DVT prophylaxis: Heparin  -GI prophylaxis: Not indicated  -Diet: Renal, 1.2l fluid restriction  -Code status: Full code    DC awaitng placement       Case Discussed with House Staff   20 minutes spent on total encounter; more than 50% of the visit was spent counseling and/or coordinating care by the attending physician.   Spectra [phone]

## 2022-12-01 NOTE — DISCHARGE NOTE NURSING/CASE MANAGEMENT/SOCIAL WORK - NSDCPEFALRISK_GEN_ALL_CORE
For information on Fall & Injury Prevention, visit: [email or web address] OR  [email or web address] OR  https://www.cdc.gov/steadi/patient.html

## 2022-12-01 NOTE — DISCHARGE NOTE PROVIDER - NSDCFUADDINST_GEN_ALL_CORE_FT
No weight bearing on left foot Continue with dressing change q24h Xochilt as stated. Please note that dressing change can be performed with Aquacel Ag if Xochilt is not available at facility.   Strict non weight bearing orders to left foot Continue with dressing change q24h Xochilt as stated. Please note that dressing change can be performed with Aquacel Ag if Xochilt is not available at facility.   Strict non weight bearing orders to left foot    As ESBL bacterias are growing, give 80 mg (1 mg/kg Adjusted BW) post HD. Plan at least  4 weeks from debridement date ( 29/11/22) given evidence of calcaneal infection in OR. will need Gentamicin level drawn pre HD to ensure levels < 2. Weekly CBC, CMP, ESR/CRP. ID follow-up with Dr. Gomez Mathur for Telehealth. We will call the patient between 10:30-1:[id number] [address] [phone]

## 2022-12-01 NOTE — DISCHARGE NOTE PROVIDER - CARE PROVIDER_API CALL
Ifeanyi Zarate (DPM)  Surgery  General SurgN  [address] [place] [address]  Phone: [phone]  Fax: [phone]  Follow Up Time: 1 week    Orlando Sandoval)  [address]  [address] [place] [address]  Phone: [phone]  Fax: [phone]  Follow Up Time: 1 month    Petey Howard)  Internal Medicine  [address] [place] [address]  Phone: [phone]  Fax: [phone]  Follow Up Time: 2 weeks   Ifeanyi Zarate (DPM)  Surgery  General SurgN  [address] [place] [address]  Phone: [phone]  Fax: [phone]  Follow Up Time: 1 week    Orlando Sandoval)  [id number]  [address] [place] [address]  Phone: [phone]  Fax: [phone]  Follow Up Time: 1 month    Gomez Britton)  Infectious Disease; Internal Medicine  [address] [place] [address]  Phone: [phone]  Fax: [phone]  Scheduled Appointment: 12/06/2022   Ifeanyi Zarate (DPM)  Surgery  General SurgN  [address] [place] [address]  Phone: [phone]  Fax: [phone]  Follow Up Time: 1 week    Orlando Sandoval)  [address]  [address] [place] [address]  Phone: [phone]  Fax: [phone]  Follow Up Time: 1 week    Gomez Britton)  Infectious Disease; Internal Medicine  [address] [place] [address]  Phone: [phone]  Fax: [phone]  Follow Up Time: 1 week    cardiologist,   Phone: (   )    -  Fax: (   )    -  Follow Up Time: 1 week

## 2022-12-01 NOTE — DISCHARGE NOTE PROVIDER - EXTENDED VTE YES NO FOR MLM ASPIRIN
No complaints/needs voiced. Nad noted. Vss. Safety measures ongoing. Report given. Care released. 97%, 64, 16, 144/78  
,

## 2022-12-01 NOTE — DISCHARGE NOTE PROVIDER - DISCHARGE DIET
DASH Diet/Low Sodium Diet/Renal Diets (for dialysis) DASH Diet/Low Sodium Diet/Consistent Carbohydrate Diabetic Diets/Renal Diets (for dialysis)

## 2022-12-01 NOTE — DISCHARGE NOTE NURSING/CASE MANAGEMENT/SOCIAL WORK - PATIENT PORTAL LINK FT
You can access the FollowMyHealth Patient Portal offered by [facility] by registering at the following website: [email or web address]. By joining [employer]’s FollowMyHealth portal, you will also be able to view your health information using other applications (apps) compatible with our system.

## 2022-12-01 NOTE — DISCHARGE NOTE PROVIDER - NSDCFUSCHEDAPPT_GEN_ALL_CORE_FT
Rian PACHECO  [facility] Physician [facility]  UROLOGY [address] [place] [address]  Scheduled Appointment: 02/14/2023

## 2022-12-01 NOTE — DISCHARGE NOTE PROVIDER - ATTENDING DISCHARGE PHYSICAL EXAMINATION:
VITALS:   T(C): 36.6 (12-01-22 @ 08:14), Max: 36.7 (11-30-22 @ 23:54)  HR: 83 (12-01-22 @ 08:14) (81 - 89)  BP: 150/80 (12-01-22 @ 08:14) (133/60 - 150/80)  RR: 19 (12-01-22 @ 04:01) (19 - 20)  SpO2: --    GENERAL: NAD, lying in bed comfortabl  LUNGS: Unlabored respirations.  Clear to auscultation bilaterally,   ABDOMEN: Soft, nontender, nondistended, +BS  EXTREMITIES:left foot wrapped by podiatry pulses positive

## 2022-12-01 NOTE — CHART NOTE - NSCHARTNOTEFT_GEN_A_CORE
Per nursing, patient's foot dressing was changed early AM today 12/1. Continue with daily dressing changes as stated in orders and upon discharge.     Podiatry   [phone] Per nursing, patient's foot dressing was changed early AM today 12/1. Continue with daily dressing changes as stated in orders and upon discharge.      Podiatry   [phone]

## 2022-12-02 LAB
-  AMIKACIN: SIGNIFICANT CHANGE UP
-  AMOXICILLIN/CLAVULANIC ACID: SIGNIFICANT CHANGE UP
-  AMPICILLIN/SULBACTAM: SIGNIFICANT CHANGE UP
-  AMPICILLIN: SIGNIFICANT CHANGE UP
-  AZTREONAM: SIGNIFICANT CHANGE UP
-  CEFAZOLIN: SIGNIFICANT CHANGE UP
-  CEFEPIME: SIGNIFICANT CHANGE UP
-  CEFOXITIN: SIGNIFICANT CHANGE UP
-  CEFTRIAXONE: SIGNIFICANT CHANGE UP
-  CIPROFLOXACIN: SIGNIFICANT CHANGE UP
-  ERTAPENEM: SIGNIFICANT CHANGE UP
-  GENTAMICIN: SIGNIFICANT CHANGE UP
-  IMIPENEM: SIGNIFICANT CHANGE UP
-  IMIPENEM: SIGNIFICANT CHANGE UP
-  LEVOFLOXACIN: SIGNIFICANT CHANGE UP
-  MEROPENEM: SIGNIFICANT CHANGE UP
-  PIPERACILLIN/TAZOBACTAM: SIGNIFICANT CHANGE UP
-  TOBRAMYCIN: SIGNIFICANT CHANGE UP
-  TRIMETHOPRIM/SULFAMETHOXAZOLE: SIGNIFICANT CHANGE UP
ALBUMIN SERPL ELPH-MCNC: 3.1 G/DL — LOW (ref 3.5–5.2)
ALBUMIN SERPL ELPH-MCNC: 3.7 G/DL — SIGNIFICANT CHANGE UP (ref 3.5–5.2)
ALP SERPL-CCNC: 151 U/L — HIGH (ref 30–115)
ALP SERPL-CCNC: 173 U/L — HIGH (ref 30–115)
ALT FLD-CCNC: <5 U/L — SIGNIFICANT CHANGE UP (ref 0–41)
ALT FLD-CCNC: <5 U/L — SIGNIFICANT CHANGE UP (ref 0–41)
ANION GAP SERPL CALC-SCNC: 13 MMOL/L — SIGNIFICANT CHANGE UP (ref 7–14)
AST SERPL-CCNC: 6 U/L — SIGNIFICANT CHANGE UP (ref 0–41)
AST SERPL-CCNC: 7 U/L — SIGNIFICANT CHANGE UP (ref 0–41)
BASOPHILS # BLD AUTO: 0.07 K/UL — SIGNIFICANT CHANGE UP (ref 0–0.2)
BASOPHILS NFR BLD AUTO: 1.2 % — HIGH (ref 0–1)
BILIRUB DIRECT SERPL-MCNC: <0.2 MG/DL — SIGNIFICANT CHANGE UP (ref 0–0.3)
BILIRUB INDIRECT FLD-MCNC: >0 MG/DL — LOW (ref 0.2–1.2)
BILIRUB SERPL-MCNC: 0.2 MG/DL — SIGNIFICANT CHANGE UP (ref 0.2–1.2)
BILIRUB SERPL-MCNC: 0.3 MG/DL — SIGNIFICANT CHANGE UP (ref 0.2–1.2)
BUN SERPL-MCNC: 31 MG/DL — HIGH (ref 10–20)
CALCIUM SERPL-MCNC: 8.3 MG/DL — LOW (ref 8.4–10.5)
CHLORIDE SERPL-SCNC: 99 MMOL/L — SIGNIFICANT CHANGE UP (ref 98–110)
CO2 SERPL-SCNC: 29 MMOL/L — SIGNIFICANT CHANGE UP (ref 17–32)
CREAT SERPL-MCNC: 3.4 MG/DL — HIGH (ref 0.7–1.5)
CREAT SERPL-MCNC: 5.5 MG/DL — CRITICAL HIGH (ref 0.7–1.5)
CRP SERPL-MCNC: 36.8 MG/L — HIGH
CULTURE RESULTS: SIGNIFICANT CHANGE UP
CULTURE RESULTS: SIGNIFICANT CHANGE UP
EGFR: 11 ML/MIN/1.73M2 — LOW
EGFR: 19 ML/MIN/1.73M2 — LOW
EOSINOPHIL # BLD AUTO: 0.15 K/UL — SIGNIFICANT CHANGE UP (ref 0–0.7)
EOSINOPHIL NFR BLD AUTO: 2.5 % — SIGNIFICANT CHANGE UP (ref 0–8)
ERYTHROCYTE [SEDIMENTATION RATE] IN BLOOD: 76 MM/HR — HIGH (ref 0–10)
GLUCOSE BLDC GLUCOMTR-MCNC: 116 MG/DL — HIGH (ref 70–99)
GLUCOSE BLDC GLUCOMTR-MCNC: 132 MG/DL — HIGH (ref 70–99)
GLUCOSE BLDC GLUCOMTR-MCNC: 247 MG/DL — HIGH (ref 70–99)
GLUCOSE BLDC GLUCOMTR-MCNC: 90 MG/DL — SIGNIFICANT CHANGE UP (ref 70–99)
GLUCOSE SERPL-MCNC: 100 MG/DL — HIGH (ref 70–99)
HCT VFR BLD CALC: 25 % — LOW (ref 42–52)
HGB BLD-MCNC: 8.4 G/DL — LOW (ref 14–18)
IMM GRANULOCYTES NFR BLD AUTO: 0.3 % — SIGNIFICANT CHANGE UP (ref 0.1–0.3)
INR BLD: 1.28 RATIO — SIGNIFICANT CHANGE UP (ref 0.65–1.3)
LYMPHOCYTES # BLD AUTO: 0.58 K/UL — LOW (ref 1.2–3.4)
LYMPHOCYTES # BLD AUTO: 9.6 % — LOW (ref 20.5–51.1)
MAGNESIUM SERPL-MCNC: 1.9 MG/DL — SIGNIFICANT CHANGE UP (ref 1.8–2.4)
MCHC RBC-ENTMCNC: 29.8 PG — SIGNIFICANT CHANGE UP (ref 27–31)
MCHC RBC-ENTMCNC: 33.6 G/DL — SIGNIFICANT CHANGE UP (ref 32–37)
MCV RBC AUTO: 88.7 FL — SIGNIFICANT CHANGE UP (ref 80–94)
METHOD TYPE: SIGNIFICANT CHANGE UP
MONOCYTES # BLD AUTO: 0.37 K/UL — SIGNIFICANT CHANGE UP (ref 0.1–0.6)
MONOCYTES NFR BLD AUTO: 6.1 % — SIGNIFICANT CHANGE UP (ref 1.7–9.3)
NEUTROPHILS # BLD AUTO: 4.83 K/UL — SIGNIFICANT CHANGE UP (ref 1.4–6.5)
NEUTROPHILS NFR BLD AUTO: 80.3 % — HIGH (ref 42.2–75.2)
NRBC # BLD: 0 /100 WBCS — SIGNIFICANT CHANGE UP (ref 0–0)
ORGANISM # SPEC MICROSCOPIC CNT: SIGNIFICANT CHANGE UP
PLATELET # BLD AUTO: 221 K/UL — SIGNIFICANT CHANGE UP (ref 130–400)
POTASSIUM SERPL-MCNC: 3.8 MMOL/L — SIGNIFICANT CHANGE UP (ref 3.5–5)
POTASSIUM SERPL-SCNC: 3.8 MMOL/L — SIGNIFICANT CHANGE UP (ref 3.5–5)
PROT SERPL-MCNC: 6.8 G/DL — SIGNIFICANT CHANGE UP (ref 6–8)
PROT SERPL-MCNC: 7.5 G/DL — SIGNIFICANT CHANGE UP (ref 6–8)
PROTHROM AB SERPL-ACNC: 14.7 SEC — HIGH (ref 9.95–12.87)
RBC # BLD: 2.82 M/UL — LOW (ref 4.7–6.1)
RBC # FLD: 16 % — HIGH (ref 11.5–14.5)
SODIUM SERPL-SCNC: 141 MMOL/L — SIGNIFICANT CHANGE UP (ref 135–146)
SPECIMEN SOURCE: SIGNIFICANT CHANGE UP
SPECIMEN SOURCE: SIGNIFICANT CHANGE UP
WBC # BLD: 6.02 K/UL — SIGNIFICANT CHANGE UP (ref 4.8–10.8)
WBC # FLD AUTO: 6.02 K/UL — SIGNIFICANT CHANGE UP (ref 4.8–10.8)

## 2022-12-02 PROCEDURE — 99232 SBSQ HOSP IP/OBS MODERATE 35: CPT

## 2022-12-02 RX ORDER — GENTAMICIN SULFATE 40 MG/ML
160 VIAL (ML) INJECTION ONCE
Refills: 0 | Status: COMPLETED | OUTPATIENT
Start: 2022-12-02 | End: 2022-12-02

## 2022-12-02 RX ADMIN — HEPARIN SODIUM 5000 UNIT(S): 5000 INJECTION INTRAVENOUS; SUBCUTANEOUS at 15:03

## 2022-12-02 RX ADMIN — TAMSULOSIN HYDROCHLORIDE 0.4 MILLIGRAM(S): 0.4 CAPSULE ORAL at 23:21

## 2022-12-02 RX ADMIN — CLOPIDOGREL BISULFATE 75 MILLIGRAM(S): 75 TABLET, FILM COATED ORAL at 12:34

## 2022-12-02 RX ADMIN — Medication 40 MILLIGRAM(S): at 05:49

## 2022-12-02 RX ADMIN — Medication 12.5 MILLIGRAM(S): at 17:14

## 2022-12-02 RX ADMIN — HEPARIN SODIUM 5000 UNIT(S): 5000 INJECTION INTRAVENOUS; SUBCUTANEOUS at 23:21

## 2022-12-02 RX ADMIN — Medication 5 UNIT(S): at 09:56

## 2022-12-02 RX ADMIN — CEFEPIME 100 MILLIGRAM(S): 1 INJECTION, POWDER, FOR SOLUTION INTRAMUSCULAR; INTRAVENOUS at 11:01

## 2022-12-02 RX ADMIN — Medication 5 UNIT(S): at 12:35

## 2022-12-02 RX ADMIN — Medication 81 MILLIGRAM(S): at 12:34

## 2022-12-02 RX ADMIN — Medication 5 MILLILITER(S): at 05:50

## 2022-12-02 RX ADMIN — ATORVASTATIN CALCIUM 40 MILLIGRAM(S): 80 TABLET, FILM COATED ORAL at 23:21

## 2022-12-02 RX ADMIN — Medication 1 MILLIGRAM(S): at 12:34

## 2022-12-02 RX ADMIN — Medication 5 MILLILITER(S): at 15:03

## 2022-12-02 RX ADMIN — HEPARIN SODIUM 5000 UNIT(S): 5000 INJECTION INTRAVENOUS; SUBCUTANEOUS at 05:49

## 2022-12-02 RX ADMIN — Medication 12.5 MILLIGRAM(S): at 05:49

## 2022-12-02 RX ADMIN — Medication 5 MILLILITER(S): at 23:21

## 2022-12-02 RX ADMIN — INSULIN GLARGINE 13 UNIT(S): 100 INJECTION, SOLUTION SUBCUTANEOUS at 22:00

## 2022-12-02 RX ADMIN — FAMOTIDINE 20 MILLIGRAM(S): 10 INJECTION INTRAVENOUS at 12:34

## 2022-12-02 RX ADMIN — PREGABALIN 1000 MICROGRAM(S): 225 CAPSULE ORAL at 12:34

## 2022-12-02 RX ADMIN — SEVELAMER CARBONATE 800 MILLIGRAM(S): 2400 POWDER, FOR SUSPENSION ORAL at 08:30

## 2022-12-02 RX ADMIN — SENNA PLUS 2 TABLET(S): 8.6 TABLET ORAL at 23:21

## 2022-12-02 RX ADMIN — SEVELAMER CARBONATE 800 MILLIGRAM(S): 2400 POWDER, FOR SUSPENSION ORAL at 17:04

## 2022-12-02 RX ADMIN — Medication 30 MILLIGRAM(S): at 05:49

## 2022-12-02 RX ADMIN — Medication 408 MILLIGRAM(S): at 19:00

## 2022-12-02 NOTE — PROGRESS NOTE ADULT - SUBJECTIVE AND OBJECTIVE BOX
24H events:    Patient is a 62y old Male who presents with a chief complaint of Chest pain (02 Dec 2022 09:27)  Primary diagnosis of Osteomyelitis of ankle or foot, acute  Today is hospital day 18d.    Patient seen and examined at bedside. Reports no active complaints.     PAST MEDICAL & SURGICAL HISTORY  CAD (coronary artery disease)  1 stent 2008    S/P CABG (coronary artery bypass graft)  x4    Diabetes mellitus    Transient ischemic attack (TIA)  2017; 2008    Chronic kidney disease (CKD)  Stage IV    Hypertension    Stented coronary artery  in 2008    Myocardial infarction  2012    PAD (peripheral artery disease)  S/p bypass left leg    HLD (hyperlipidemia)    BPH (benign prostatic hyperplasia)    Pain in left knee  s/p fall    OA (osteoarthritis)    [place] (hard of hearing)    Chronic anemia    S/P CABG (coronary artery bypass graft)  2012    H/O arterial bypass of lower limb  Left Lower Extremity (2016)    History of surgery  Left CEA (2017)  Left Pinkie toe Amputation (2014)  CABG x 4 (2012)  Card cath - stent (2008)      AV fistula  2019  LEFT AV FISTULA      SOCIAL HISTORY:  Negative for smoking/alcohol/drug use.     ALLERGIES:  No Known Allergies    MEDICATIONS:  STANDING MEDICATIONS  aspirin enteric coated 81 milliGRAM(s) Oral daily  atorvastatin 40 milliGRAM(s) Oral at bedtime  cefepime   IVPB 2000 milliGRAM(s) IV Intermittent <User Schedule>  clopidogrel Tablet 75 milliGRAM(s) Oral daily  cyanocobalamin 1000 MICROGram(s) Oral daily  darbepoetin Injectable Syringe 60 MICROGram(s) IV Push <User Schedule>  dextrose 5%. 1000 milliLiter(s) IV Continuous <Continuous>  dextrose 5%. 1000 milliLiter(s) IV Continuous <Continuous>  dextrose 50% Injectable 25 Gram(s) IV Push once  dextrose 50% Injectable 12.5 Gram(s) IV Push once  dextrose 50% Injectable 25 Gram(s) IV Push once  famotidine    Tablet 20 milliGRAM(s) Oral daily  folic acid 1 milliGRAM(s) Oral daily  furosemide    Tablet 40 milliGRAM(s) Oral daily  glucagon  Injectable 1 milliGRAM(s) IntraMuscular once  guaifenesin/dextromethorphan Oral Liquid 5 milliLiter(s) Oral three times a day  heparin   Injectable 5000 Unit(s) SubCutaneous every 8 hours  influenza   Vaccine 0.5 milliLiter(s) IntraMuscular once  insulin glargine Injectable (LANTUS) 13 Unit(s) SubCutaneous at bedtime  insulin lispro (ADMELOG) corrective regimen sliding scale   SubCutaneous three times a day before meals  insulin lispro Injectable (ADMELOG) 5 Unit(s) SubCutaneous before breakfast  insulin lispro Injectable (ADMELOG) 5 Unit(s) SubCutaneous before lunch  metoprolol tartrate 12.5 milliGRAM(s) Oral every 12 hours  NIFEdipine XL 30 milliGRAM(s) Oral daily  regadenoson Injectable 0.4 milliGRAM(s) IV Push once  senna 2 Tablet(s) Oral at bedtime  sevelamer carbonate 800 milliGRAM(s) Oral three times a day with meals  tamsulosin 0.4 milliGRAM(s) Oral at bedtime    PRN MEDICATIONS  acetaminophen     Tablet .. 650 milliGRAM(s) Oral every 6 hours PRN  dextrose Oral Gel 15 Gram(s) Oral once PRN  melatonin 3 milliGRAM(s) Oral at bedtime PRN    VITALS:   T(F): 98.6  HR: 80  BP: 151/82  RR: 16  SpO2: 98%    LABS:                        8.4    6.02  )-----------( 221      ( 02 Dec 2022 12:43 )             25.0     12-02    141  |  99  |  31<H>  ----------------------------<  100<H>  3.8   |  29  |  3.4<H>    Ca    8.3<L>      02 Dec 2022 12:43  Mg     1.9     12-02    TPro  7.5  /  Alb  3.7  /  TBili  0.3  /  DBili  x   /  AST  7   /  ALT  <5  /  AlkPhos  173<H>  12-02    PT/INR - ( 02 Dec 2022 07:34 )   PT: 14.70 sec;   INR: 1.28 ratio               Sedimentation Rate, Erythrocyte: 76 mm/Hr *H* (12-02-22 @ 12:43)      Culture - Other (collected 29 Nov 2022 22:03)  Source: .Other None  Preliminary Report (01 Dec 2022 19:04):    Moderate Escherichia coli    Normal skin sirisha isolated    Culture - Other (collected 29 Nov 2022 22:00)  Source: .Other None  Preliminary Report (01 Dec 2022 19:04):    Numerous Escherichia coli    Rare Proteus vulgaris group    Normal skin sirisha isolated          RADIOLOGY:    PHYSICAL EXAM:      GENERAL: NAD, well-groomed, well-developed  NERVOUS SYSTEM:  Alert & Oriented X3, Good concentration;  PULM: Clear to auscultation bilaterally  CARDIAC: Regular rate and rhythm  GI: Soft, Nontender, Nondistended; Bowel sounds present  EXTREMITIES:  left foot wrapper

## 2022-12-02 NOTE — PROGRESS NOTE ADULT - SUBJECTIVE AND OBJECTIVE BOX
SCHWABACHER, LAWRENCE  62y  [facility] F4-4B 018 B      Patient is a 62y old  Male who presents with a chief complaint of Chest pain (30 Nov 2022 13:29)      INTERVAL HPI/OVERNIGHT EVENTS:  Patient feels well. he has no complains.agreeable with dc       REVIEW OF SYSTEMS:        FAMILY HISTORY:  Family history of heart disease (Father)    DM (diabetes mellitus) (Mother)    ESRD (end stage renal disease) on dialysis (Mother)      T(C): 36.2 (11-30-22 @ 15:35), Max: 36.4 (11-29-22 @ 19:19)  HR: 88 (11-30-22 @ 15:35) (71 - 98)  BP: 133/60 (11-30-22 @ 15:35) (123/64 - 163/83)  RR: 20 (11-30-22 @ 15:35) (16 - 22)  SpO2: 95% (11-29-22 @ 20:00) (95% - 100%)  Wt(kg): --Vital Signs Last 24 Hrs  T(C): 36.2 (30 Nov 2022 15:35), Max: 36.4 (29 Nov 2022 19:19)  T(F): 97.2 (30 Nov 2022 15:35), Max: 97.5 (29 Nov 2022 19:19)  HR: 88 (30 Nov 2022 15:35) (71 - 98)  BP: 133/60 (30 Nov 2022 15:35) (123/64 - 163/83)  BP(mean): 104 (29 Nov 2022 17:20) (104 - 104)  RR: 20 (30 Nov 2022 15:35) (16 - 22)  SpO2: 95% (29 Nov 2022 20:00) (95% - 100%)    Parameters below as of 30 Nov 2022 11:15  Patient On (Oxygen Delivery Method): room air        PHYSICAL EXAM:  GENERAL: NAD, well-groomed, well-developed  NERVOUS SYSTEM:  Alert & Oriented X3, Good concentration;  PULM: Clear to auscultation bilaterally  CARDIAC: Regular rate and rhythm  GI: Soft, Nontender, Nondistended; Bowel sounds present  EXTREMITIES:  left foot wrapper     Consultant(s) Notes Reviewed:  [x ] YES  [ ] NO  Care Discussed with Consultants/Other Providers [ x] YES  [ ] NO    LABS:                            7.3    4.70  )-----------( 180      ( 30 Nov 2022 09:36 )             21.4   11-30    137  |  98  |  43<H>  ----------------------------<  154<H>  4.4   |  26  |  4.6<HH>    Ca    8.0<L>      30 Nov 2022 09:36  Mg     1.9     11-30    TPro  6.9  /  Alb  3.4<L>  /  TBili  0.2  /  DBili  <0.2  /  AST  6   /  ALT  <5  /  AlkPhos  141<H>  11-30            acetaminophen     Tablet .. 650 milliGRAM(s) Oral every 6 hours PRN  aspirin enteric coated 81 milliGRAM(s) Oral daily  atorvastatin 40 milliGRAM(s) Oral at bedtime  cefepime   IVPB 2000 milliGRAM(s) IV Intermittent <User Schedule>  clopidogrel Tablet 75 milliGRAM(s) Oral daily  cyanocobalamin 1000 MICROGram(s) Oral daily  darbepoetin Injectable Syringe 60 MICROGram(s) IV Push <User Schedule>  dextrose 5%. 1000 milliLiter(s) IV Continuous <Continuous>  dextrose 5%. 1000 milliLiter(s) IV Continuous <Continuous>  dextrose 50% Injectable 25 Gram(s) IV Push once  dextrose 50% Injectable 12.5 Gram(s) IV Push once  dextrose 50% Injectable 25 Gram(s) IV Push once  dextrose Oral Gel 15 Gram(s) Oral once PRN  famotidine    Tablet 20 milliGRAM(s) Oral daily  folic acid 1 milliGRAM(s) Oral daily  furosemide    Tablet 40 milliGRAM(s) Oral daily  glucagon  Injectable 1 milliGRAM(s) IntraMuscular once  guaifenesin/dextromethorphan Oral Liquid 5 milliLiter(s) Oral three times a day  heparin   Injectable 5000 Unit(s) SubCutaneous every 8 hours  influenza   Vaccine 0.5 milliLiter(s) IntraMuscular once  insulin glargine Injectable (LANTUS) 7 Unit(s) SubCutaneous at bedtime  insulin glargine Injectable (LANTUS) 13 Unit(s) SubCutaneous at bedtime  insulin lispro (ADMELOG) corrective regimen sliding scale   SubCutaneous three times a day before meals  insulin lispro Injectable (ADMELOG) 5 Unit(s) SubCutaneous before breakfast  insulin lispro Injectable (ADMELOG) 5 Unit(s) SubCutaneous before lunch  melatonin 3 milliGRAM(s) Oral at bedtime PRN  metoprolol tartrate 12.5 milliGRAM(s) Oral every 12 hours  NIFEdipine XL 30 milliGRAM(s) Oral daily  regadenoson Injectable 0.4 milliGRAM(s) IV Push once  remdesivir  IVPB 100 milliGRAM(s) IV Intermittent every 24 hours  remdesivir  IVPB   IV Intermittent   senna 2 Tablet(s) Oral at bedtime  sevelamer carbonate 800 milliGRAM(s) Oral three times a day with meals  tamsulosin 0.4 milliGRAM(s) Oral at bedtime    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 and cefepime post dialysis s.p wound Vac, chronic anemia, HFmrEF, presented to the hospital for chest pain     1. Chest pain. hx of CAD s/p PCI/CABG and PAD s/p angioplasty  ACS/CAD presenting with Chest pain  - Admit to medicine  - Cont aspirin and plavix   - Cont statin 40mg HS   - Cont Nifedipine 30mg po daily   - Troponin stable between 0.7-0.8  NST: FIXED PERFUSION DEFECT INVOLVING ANTERIOR WALL OF THE LEFT VENTRICLE EXTENDING TO APEX CONSISTENT WITH SCAR. GLOBAL HYPOKINESIS WITH POOR THICKENING OF THE ANTERIOR WALL. EF  36 %       2. NSVT  -10 beats of NSVT observed on tele on 11/16 PM  -Started Metoprolol 12.5 BID  -Maintain K>4 and Mg>2    3. Anemia of chronic disease s/p multiple PRBC  - Monitor CBC, transfuse to keep >8    4.  ESRD on HD   - HD per nephrology  - B/L pitting edema, little urine output    5. Recent hx Left calcaneal OM   - Wound Cx 9/16 - Enterobacter cloacae  - wound CX 10/16 VRE Faecium Enterobacter cloacae complex, Proteus vulgaris, Morganella morganii   - last wound cx 11/29/2022:E.coli and proteus mirabilis   - s/p debridement 10/21 Wound Cx Proteus mirabilis, VRE faecium, Pseudomonas putida  - s/p debridement 10/24 Crumble L calcaneous   - s/p debridement 11/3 with wound vac in place  - Cefepime 2g post HD through 11/30; s/p 3 session of daptomycin post-HD  - On last admission, ID plan for 4 weeks from last debridement (11/3-11/30)  -Debridement done on 11/29/2022.   - ID consult:pending   - Podiatry consult appreciated     6.  PAD  - s/p angiogram LLE with peroneal artery angioplasty and arterectomy 10/27  - Cont meds as above    7. Type 2 DM  - Adjusted insulin due to hypoglycemia in am  - Lantus dose was decreased     8.  BPH  - c/w Tamsulosin 0.4 mg daily    9. COVID 19 asymptomatic   - Finished a 3 day course of remdisivir     Pending: Podiatry procedure  -DVT prophylaxis: Heparin  -GI prophylaxis: Not indicated  -Diet: Renal, 1.2l fluid restriction  -Code status: Full code      patient can be dc once bed is available at [facility]       Case Discussed with House Staff   20 minutes spent on total encounter; more than 50% of the visit was spent counseling and/or coordinating care by the attending physician.   Spectra [phone]     SCHWABACHER, LAWRENCE  62y  [facility] F4-4B 018 B      Patient is a 62y old  Male who presents with a chief complaint of Chest pain (30 Nov 2022 13:29)      INTERVAL HPI/OVERNIGHT EVENTS:  Patient feels well. he has no complains.agreeable with dc       REVIEW OF SYSTEMS:        FAMILY HISTORY:  Family history of heart disease (Father)    DM (diabetes mellitus) (Mother)    ESRD (end stage renal disease) on dialysis (Mother)      T(C): 36.2 (11-30-22 @ 15:35), Max: 36.4 (11-29-22 @ 19:19)  HR: 88 (11-30-22 @ 15:35) (71 - 98)  BP: 133/60 (11-30-22 @ 15:35) (123/64 - 163/83)  RR: 20 (11-30-22 @ 15:35) (16 - 22)  SpO2: 95% (11-29-22 @ 20:00) (95% - 100%)  Wt(kg): --Vital Signs Last 24 Hrs  T(C): 36.2 (30 Nov 2022 15:35), Max: 36.4 (29 Nov 2022 19:19)  T(F): 97.2 (30 Nov 2022 15:35), Max: 97.5 (29 Nov 2022 19:19)  HR: 88 (30 Nov 2022 15:35) (71 - 98)  BP: 133/60 (30 Nov 2022 15:35) (123/64 - 163/83)  BP(mean): 104 (29 Nov 2022 17:20) (104 - 104)  RR: 20 (30 Nov 2022 15:35) (16 - 22)  SpO2: 95% (29 Nov 2022 20:00) (95% - 100%)    Parameters below as of 30 Nov 2022 11:15  Patient On (Oxygen Delivery Method): room air        PHYSICAL EXAM:  GENERAL: NAD, well-groomed, well-developed  NERVOUS SYSTEM:  Alert & Oriented X3, Good concentration;  PULM: Clear to auscultation bilaterally  CARDIAC: Regular rate and rhythm  GI: Soft, Nontender, Nondistended; Bowel sounds present  EXTREMITIES:  left foot wrapper     Consultant(s) Notes Reviewed:  [x ] YES  [ ] NO  Care Discussed with Consultants/Other Providers [ x] YES  [ ] NO    LABS:                            7.3    4.70  )-----------( 180      ( 30 Nov 2022 09:36 )             21.4   11-30    137  |  98  |  43<H>  ----------------------------<  154<H>  4.4   |  26  |  4.6<HH>    Ca    8.0<L>      30 Nov 2022 09:36  Mg     1.9     11-30    TPro  6.9  /  Alb  3.4<L>  /  TBili  0.2  /  DBili  <0.2  /  AST  6   /  ALT  <5  /  AlkPhos  141<H>  11-30            acetaminophen     Tablet .. 650 milliGRAM(s) Oral every 6 hours PRN  aspirin enteric coated 81 milliGRAM(s) Oral daily  atorvastatin 40 milliGRAM(s) Oral at bedtime  cefepime   IVPB 2000 milliGRAM(s) IV Intermittent <User Schedule>  clopidogrel Tablet 75 milliGRAM(s) Oral daily  cyanocobalamin 1000 MICROGram(s) Oral daily  darbepoetin Injectable Syringe 60 MICROGram(s) IV Push <User Schedule>  dextrose 5%. 1000 milliLiter(s) IV Continuous <Continuous>  dextrose 5%. 1000 milliLiter(s) IV Continuous <Continuous>  dextrose 50% Injectable 25 Gram(s) IV Push once  dextrose 50% Injectable 12.5 Gram(s) IV Push once  dextrose 50% Injectable 25 Gram(s) IV Push once  dextrose Oral Gel 15 Gram(s) Oral once PRN  famotidine    Tablet 20 milliGRAM(s) Oral daily  folic acid 1 milliGRAM(s) Oral daily  furosemide    Tablet 40 milliGRAM(s) Oral daily  glucagon  Injectable 1 milliGRAM(s) IntraMuscular once  guaifenesin/dextromethorphan Oral Liquid 5 milliLiter(s) Oral three times a day  heparin   Injectable 5000 Unit(s) SubCutaneous every 8 hours  influenza   Vaccine 0.5 milliLiter(s) IntraMuscular once  insulin glargine Injectable (LANTUS) 7 Unit(s) SubCutaneous at bedtime  insulin glargine Injectable (LANTUS) 13 Unit(s) SubCutaneous at bedtime  insulin lispro (ADMELOG) corrective regimen sliding scale   SubCutaneous three times a day before meals  insulin lispro Injectable (ADMELOG) 5 Unit(s) SubCutaneous before breakfast  insulin lispro Injectable (ADMELOG) 5 Unit(s) SubCutaneous before lunch  melatonin 3 milliGRAM(s) Oral at bedtime PRN  metoprolol tartrate 12.5 milliGRAM(s) Oral every 12 hours  NIFEdipine XL 30 milliGRAM(s) Oral daily  regadenoson Injectable 0.4 milliGRAM(s) IV Push once  remdesivir  IVPB 100 milliGRAM(s) IV Intermittent every 24 hours  remdesivir  IVPB   IV Intermittent   senna 2 Tablet(s) Oral at bedtime  sevelamer carbonate 800 milliGRAM(s) Oral three times a day with meals  tamsulosin 0.4 milliGRAM(s) Oral at bedtime    63 yo male, h/o type 2 DM on insulin, CAD s.p stent 2008, s/p CABG 2012 (Dr Smith), PAD s/p angioplasty and stent b/l LE, BPH, ESRD on HD through left AVF (MWF follows with Dr MARY Paula), DFU sp left toe amputation, left calcaneal OM on daptomycin and cefepime post dialysis s.p wound Vac, chronic anemia, HFmrEF, presented to the hospital for chest pain     1. Chest pain. hx of CAD s/p PCI/CABG and PAD s/p angioplasty  ACS/CAD presenting with Chest pain  - Admit to medicine  - Cont aspirin and plavix   - Cont statin 40mg HS   - Cont Nifedipine 30mg po daily   - Troponin stable between 0.7-0.8  NST: FIXED PERFUSION DEFECT INVOLVING ANTERIOR WALL OF THE LEFT VENTRICLE EXTENDING TO APEX CONSISTENT WITH SCAR. GLOBAL HYPOKINESIS WITH POOR THICKENING OF THE ANTERIOR WALL. EF  36 %       2. NSVT  -10 beats of NSVT observed on tele on 11/16 PM  -Started Metoprolol 12.5 BID  -Maintain K>4 and Mg>2    3. Anemia of chronic disease s/p multiple PRBC  - Monitor CBC, transfuse to keep >8    4.  ESRD on HD   - HD per nephrology  - B/L pitting edema, little urine output    5. Recent hx Left calcaneal OM   - Wound Cx 9/16 - Enterobacter cloacae  - wound CX 10/16 VRE Faecium Enterobacter cloacae complex, Proteus vulgaris, Morganella morganii   - last wound cx 11/29/2022:E.coli and proteus mirabilis   - s/p debridement 10/21 Wound Cx Proteus mirabilis, VRE faecium, Pseudomonas putida  - s/p debridement 10/24 Crumble L calcaneous   - s/p debridement 11/3 with wound vac in place  - Cefepime 2g post HD through 11/30; s/p 3 session of daptomycin post-HD  - On last admission, ID plan for 4 weeks from last debridement (11/3-11/30)  -Debridement done on 11/29/2022.   - ID consult:  a) Need 4 weeks of abs from last debridement. Dc on gentamicin 160mg post HD*1 followed by maintenance 80mg post hd   - Podiatry consult appreciated     6.  PAD  - s/p angiogram LLE with peroneal artery angioplasty and arterectomy 10/27  - Cont meds as above    7. Type 2 DM  - Adjusted insulin due to hypoglycemia in am  - Lantus dose was decreased     8.  BPH  - c/w Tamsulosin 0.4 mg daily    9. COVID 19 asymptomatic   - Finished a 3 day course of remdisivir     Pending: Podiatry procedure  -DVT prophylaxis: Heparin  -GI prophylaxis: Not indicated  -Diet: Renal, 1.2l fluid restriction  -Code status: Full code      patient can be dc once bed is available at [facility]       Case Discussed with House Staff   20 minutes spent on total encounter; more than 50% of the visit was spent counseling and/or coordinating care by the attending physician.   Spectra [phone]

## 2022-12-02 NOTE — CHART NOTE - NSCHARTNOTEFT_GEN_A_CORE
Patient was seen bedside today with attending present. Discussed plan with patient to full understanding. Continue with antibiotics per ID; PICC insertion if recommended. Continue with dressing change q24h Xochilt as stated in orders. Please note in discharge orders that dressing change can be performed with Aquacel Ag if Xochilt is not available at facility. Strict non weight bearing orders to left foot. Per nursing, dressing was changed AM today.     Podiatry   [phone] Patient was seen bedside today with attending present. Discussed plan with patient to full understanding. Continue with antibiotics per ID; PICC insertion if recommended. Continue with dressing change q24h Xochilt as stated in orders. Please note in discharge orders that dressing change can be performed with Aquacel Ag if Xochilt is not available at facility. Strict non weight bearing orders to left foot. Per nursing, dressing was changed AM today.      Podiatry   [phone]

## 2022-12-02 NOTE — CONSULT NOTE ADULT - ASSESSMENT
ASSESSMENT  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 and cefepime post dialysis s.p wound Vac, chronic anemia, HFmrEF, presented to the hospital for chest pain       IMPRESSION  #Chest Pain   #Left Calcaneous OM  - s/p removal of FB 9/16  - Wound Cx 9/16 - Enterobacter cloacae  - treated with two weeks post-HD vancomycin + cefepime  - wound CX 10/16 VRE Faecim, Enterobacter cloacae complex, Proteus vulgaris, Morganella morganii   - s/p debridement 10/21 Wound Cx Proteus mirabilis, VRE faecium, Pseudomonas putida  - s/p debridement 10/24 Crumble L alcaenous - granular healthy post   - s/p debridement 11/3 with wound vac in place  - discharged to complete cefepime 2g post HD and Daptomycin post HD until 11/30   - Xray Foot AP + Lateral + Oblique, Left (11.29.22 @ 21:57): As seen on MRI from 10/17/2022, there is a pathologic fracture through  the calcaneus. There is increased erosive change of the posterior plantar   aspect of the calcaneus compared to 10/15/2022 which maybe secondary to  surgical debridement, although ongoing osteomyelitis is difficult to  exclude. There is overlying skin ulceration. No deep soft tissue gas is  seen.  - s/p debridement 11/29 - infected nonviable soft tissue and bone -- wound Cx growing ESBL E coli and proteus vulgaris     Sedimentation Rate, Erythrocyte: 118 mm/Hr (11.14.22 @ 11:33) --> Sedimentation Rate, Erythrocyte: 76 mm/Hr (12.02.22 @ 12:43)  C-Reactive Protein, Serum: 36.8 mg/L (12.02.22 @ 12:43)    #PAD  - s/p angiogram LLE with peroneal artery angioplasty and artherectom 10/27    #Dysuria   - Urine Cx 10/25 NG -- collected after fluconazole     #ESBL E coli Bacteremia at [facility]     #ESRD on HD  #CAD s/p CABG  #PAD s/p bilateral angioplasty  #DM  #Abx allergy:      RECOMMENDATIONS  - as ESBL growing, can switch to Gentamicin 160 mg x 1 for load, followed by 80 mg (1 mg/kg Adjusted BW) post HD   - plan at least  4 weeks from debridement given evidence of calcaneal infection in OR   - will need Gentamicin level drawn pre HD to ensure levels < 2     - Weekly CBC, CMP, ESR/CRP  - ID follow-up with Dr. Gomez Mathur for Telehealth. We will call the patient between 10:30-1:30      [address]       [phone]     Please call or message on Microsoft Teams if with any questions.  Spectra [phone]      
63 yo male, h/o type 2 DM on insulin, CAD s.p stent 2008, s/p CABG 2012 (Dr Smith), PAD s/p angioplasty and stent b/l LE, BPH, ESRD on HD through left AVF (MWF follows with Dr MARY Paula), DFU sp left toe amputation, left calcaneal OM on daptomycin and cefepime post dialysis s.p wound Vac, chronic anemia, HFmrEF, presented to the hospital for chest pain. Found to have anemia s/p 1 unit of prbc.    Assessment and plan  ESRD on HD/ Anemia / Chest pain/ OM on abx  Last HD on Friday, due today  opti 160 3 hrs 3 L UF 2 K bath  anemia s/p 1 unit of prbc transfusion  RAMSEY weekly post HD   on sevelamer 800 mg po tid  Monitor BP post HD  abx for OM  Fluid restriction   will follow 
History MI   CAD CABG   PCI    CEA    PAD  DM   ESRD on HD  with episode   chest pressure    elevated trops     did not trend up      now asymptomatic   rec   lexiscan  nuclear stress  test

## 2022-12-02 NOTE — CONSULT NOTE ADULT - SUBJECTIVE AND OBJECTIVE BOX
NEPHROLOGY CONSULTATION NOTE    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 and cefepime post dialysis s.p wound Vac, chronic anemia, HFmrEF, presented to the hospital for chest pain. Found to have anemia s/p 1 unit of prbc.  patient seen at bedside, his chest pressure is feeling better.  VS elevated BP.    PAST MEDICAL & SURGICAL HISTORY:  CAD (coronary artery disease)  1 stent 2008      S/P CABG (coronary artery bypass graft)  x4      Diabetes mellitus      Transient ischemic attack (TIA)  2017; 2008      Chronic kidney disease (CKD)  Stage IV      Hypertension      Stented coronary artery  in 2008      Myocardial infarction  2012      PAD (peripheral artery disease)  S/p bypass left leg      HLD (hyperlipidemia)      BPH (benign prostatic hyperplasia)      Pain in left knee  s/p fall      OA (osteoarthritis)      [place] (hard of hearing)      Chronic anemia      S/P CABG (coronary artery bypass graft)  2012      H/O arterial bypass of lower limb  Left Lower Extremity (2016)      History of surgery  Left CEA (2017)  Left Pinkie toe Amputation (2014)  CABG x 4 (2012)  Card cath - stent (2008)        AV fistula  2019  LEFT AV FISTULA        Allergies:  No Known Allergies    Home Medications Reviewed  Hospital Medications:   MEDICATIONS  (STANDING):  aspirin enteric coated 81 milliGRAM(s) Oral daily  atorvastatin 40 milliGRAM(s) Oral at bedtime  cefepime   IVPB 2000 milliGRAM(s) IV Intermittent <User Schedule>  clopidogrel Tablet 75 milliGRAM(s) Oral daily  dextrose 5%. 1000 milliLiter(s) (50 mL/Hr) IV Continuous <Continuous>  dextrose 5%. 1000 milliLiter(s) (100 mL/Hr) IV Continuous <Continuous>  dextrose 50% Injectable 25 Gram(s) IV Push once  dextrose 50% Injectable 12.5 Gram(s) IV Push once  dextrose 50% Injectable 25 Gram(s) IV Push once  famotidine    Tablet 20 milliGRAM(s) Oral daily  furosemide    Tablet 40 milliGRAM(s) Oral daily  glucagon  Injectable 1 milliGRAM(s) IntraMuscular once  heparin   Injectable 5000 Unit(s) SubCutaneous every 8 hours  insulin glargine Injectable (LANTUS) 30 Unit(s) SubCutaneous at bedtime  insulin lispro (ADMELOG) corrective regimen sliding scale   SubCutaneous three times a day before meals  insulin lispro Injectable (ADMELOG) 10 Unit(s) SubCutaneous three times a day before meals  NIFEdipine XL 30 milliGRAM(s) Oral daily  senna 2 Tablet(s) Oral at bedtime  sevelamer carbonate 800 milliGRAM(s) Oral three times a day with meals  tamsulosin 0.4 milliGRAM(s) Oral at bedtime    SOCIAL HISTORY:  Denies ETOH,Smoking,   FAMILY HISTORY:  Family history of heart disease (Father)    DM (diabetes mellitus) (Mother)    ESRD (end stage renal disease) on dialysis (Mother)        REVIEW OF SYSTEMS:  CONSTITUTIONAL: No weakness, fevers or chills  EYES/ENT: No visual changes;  No vertigo or throat pain   NECK: No pain or stiffness  RESPIRATORY: No cough, wheezing, hemoptysis; No shortness of breath  CARDIOVASCULAR: chest pressure.  GASTROINTESTINAL: No abdominal or epigastric pain. No nausea, vomiting, or hematemesis; No diarrhea or constipation. No melena or hematochezia.  GENITOURINARY: No dysuria, frequency, foamy urine, urinary urgency, incontinence or hematuria  NEUROLOGICAL: No numbness or weakness  SKIN: No itching, burning, rashes, or lesions   VASCULAR: bilateral lower extremity edema.   All other review of systems is negative unless indicated above.    VITALS:  Vital Signs Last 24 Hrs  T(C): 37.1 (14 Nov 2022 07:19), Max: 37.1 (14 Nov 2022 07:19)  T(F): 98.8 (14 Nov 2022 07:19), Max: 98.8 (14 Nov 2022 07:19)  HR: 101 (14 Nov 2022 07:19) (93 - 102)  BP: 165/95 (14 Nov 2022 07:19) (128/72 - 165/95)  BP(mean): --  RR: 18 (14 Nov 2022 07:19) (18 - 20)  SpO2: 99% (14 Nov 2022 07:19) (96% - 99%)    Parameters below as of 14 Nov 2022 06:06  Patient On (Oxygen Delivery Method): room air        Height (cm): 175.3 (11-13 @ 20:16)  Weight (kg): 86.2 (11-13 @ 20:16)  BMI (kg/m2): 28.1 (11-13 @ 20:16)  BSA (m2): 2.02 (11-13 @ 20:16)  PHYSICAL EXAM:  Constitutional: NAD  HEENT: anicteric sclera, oropharynx clear, MMM  Neck: No JVD  Respiratory: CTAB, no wheezes, rales or rhonchi  Cardiovascular: S1, S2, RRR  Gastrointestinal: BS+, soft, NT/ND  Extremities: No cyanosis or clubbing. b/l peripheral edema  Neurological: A/O x 3, no focal deficits  Psychiatric: Normal mood, normal affect  : No CVA tenderness.  Skin: No rashes  Vascular Access: AV fistula     LABS:  11-13    132<L>  |  90<L>  |  60<H>  ----------------------------<  159<H>  5.0   |  23  |  6.0<HH>    Ca    8.3<L>      13 Nov 2022 23:01  Mg     2.3     11-13    TPro  7.5  /  Alb  3.2<L>  /  TBili  0.4  /  DBili      /  AST  9   /  ALT  <5  /  AlkPhos  175<H>  11-13    Creatinine Trend: 6.0 <--, 5.0 <--, 6.8 <--, 6.1 <--, 5.5 <--, 6.1 <--, 5.1 <--, 6.4 <--, 5.3 <--, 7.3 <--, 5.8 <--, 4.3 <--, 5.7 <--, 4.3 <--, 6.7 <--, 6.0 <--, 4.9 <--, 5.8 <--, 5.9 <--, 4.9 <--, 4.2 <--, 5.7 <--, 4.9 <--, 4.2 <--, 5.4 <--                        8.2    7.96  )-----------( 291      ( 14 Nov 2022 11:33 )             24.3           
Podiatry Consult Note    Subjective:  SCHWABACHER, LAWRENCE  Seen Bedside 62y Male  .   Patient is a 62y old  Male who presents with a chief complaint of Chest pain (14 Nov 2022 13:56)    HPI:  63 yo male, h/o type 2 DM on insulin, CAD s.p stent 2008, s/p CABG 2012 (Dr Smith), PAD s/p angioplasty and stent b/l LE, BPH, ESRD on HD through left AVF (MWF follows with Dr MARY Paula), DFU sp left toe amputation, left calcaneal OM on daptomycin and cefepime post dialysis s.p wound Vac, chronic anemia, HFmrEF, presented to the hospital for chest pain   Pain is mid sternal, pressure like, discomfort rather than pain, occurring at rest, non radiating, exacerbated by cough, not similar to previous episodes of chest pain, not associated with tremor , diaphoresis, numbness, lightheadedness; Pain has improved with tylenol initially and then resolved with nitroglycerin SL   Patient denies fever, chills, shortness of breath, sputum, abdominal pain; ROS otherwise negative   (14 Nov 2022 08:13)      Past Medical History and Surgical History  PAST MEDICAL & SURGICAL HISTORY:  CAD (coronary artery disease)  1 stent 2008      S/P CABG (coronary artery bypass graft)  x4      Diabetes mellitus      Transient ischemic attack (TIA)  2017; 2008      Chronic kidney disease (CKD)  Stage IV      Hypertension      Stented coronary artery  in 2008      Myocardial infarction  2012      PAD (peripheral artery disease)  S/p bypass left leg      HLD (hyperlipidemia)      BPH (benign prostatic hyperplasia)      Pain in left knee  s/p fall      OA (osteoarthritis)      [place] (hard of hearing)      Chronic anemia      S/P CABG (coronary artery bypass graft)  2012      H/O arterial bypass of lower limb  Left Lower Extremity (2016)      History of surgery  Left CEA (2017)  Left Pinkie toe Amputation (2014)  CABG x 4 (2012)  Card cath - stent (2008)        AV fistula  2019  LEFT AV FISTULA           Review of Systems:  [X] Ten point review of systems is otherwise negative except as noted     Objective:  Vital Signs Last 24 Hrs  T(C): 35.3 (15 Nov 2022 09:02), Max: 36.5 (15 Nov 2022 00:13)  T(F): 95.6 (15 Nov 2022 09:02), Max: 97.7 (15 Nov 2022 00:13)  HR: 110 (15 Nov 2022 09:02) (100 - 110)  BP: 156/74 (15 Nov 2022 09:02) (141/75 - 162/89)  BP(mean): 101 (15 Nov 2022 00:13) (101 - 101)  RR: 18 (15 Nov 2022 09:02) (18 - 18)  SpO2: 99% (15 Nov 2022 09:02) (96% - 99%)    Parameters below as of 15 Nov 2022 09:02  Patient On (Oxygen Delivery Method): nasal cannula  O2 Flow (L/min): 2                          7.8    7.37  )-----------( 271      ( 15 Nov 2022 06:57 )             23.2                 11-15    134<L>  |  94<L>  |  43<H>  ----------------------------<  77  3.7   |  27  |  4.9<HH>    Ca    8.1<L>      15 Nov 2022 06:57  Phos  3.6     11-15  Mg     2.0     11-15    TPro  7.5  /  Alb  3.2<L>  /  TBili  0.4  /  DBili  x   /  AST  9   /  ALT  <5  /  AlkPhos  175<H>  11-13    MRI-L Foot, 10/17/22  IMPRESSION:  Osteomyelitis and erosion of the calcaneus with a vertical pathologic fracture extending to the subtalar joint. Gangrene of overlying subcutaneous tissues.   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      Physical Exam - Lower Extremity Focused:   Derm:  Open Left Plantar Ulcer @ Plantar Medial Rearfoot;    -Wound Base Fibrogranular; 50% Fibrous, 50% Granular; moderate serosanguinous drainage    -Wound Margins Irregular; mild macerated borders    - measures 7.0 x 5.5 x 2.4cm;   Mild Cellulitis w/ Erythema of Left Lower Extremity; improved since previous visit on 11/7/22;  Noted mild decrease in periwound erythema  Vascular: DP and PT Pulses Diminished; Foot to Warm to the touch; Capillary Refill Delayed to the Toes;  Neuro: Protective Sensation Diminished / Moderately Neuropathic   MSK: No Pain On Palpation, Charcot deformity    Assessment:  - S/P Excisional Debridement of Soft Tissue & Bone, Left Foot; (DOS: 10/21/22)   - S/P exc dbx st/b Left foot 10/24/22  - S/P exc dbx st/b Left foot 11/3/22      Plan:  Chart reviewed and Patient evaluated. All Questions and Concerns Addressed and Answered  Wound care; Wound flushed with NS, packed with xeroform, DSD, Kerlix  - Wound [facility]AC was [facility]  Weight Bearing Status; WBAT w/ Heel Touch w/ Surgical Shoe;   - Follow cardiology for clearance  - Possible surgical debridement pending cardiac clearance    Discussed Plan w/ Dr. Garvin    Podiatry 
SCHWABACHER, LAWRENCE  62y, Male  Allergy: No Known Allergies      CHIEF COMPLAINT: Chest pain (02 Dec 2022 16:19)      LOS  18d    HPI:  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 and cefepime post dialysis s.p wound Vac, chronic anemia, HFmrEF, presented to the hospital for chest pain   Pain is mid sternal, pressure like, discomfort rather than pain, occurring at rest, non radiating, exacerbated by cough, not similar to previous episodes of chest pain, not associated with tremor , diaphoresis, numbness, lightheadedness; Pain has improved with tylenol initially and then resolved with nitroglycerin SL   Patient denies fever, chills, shortness of breath, sputum, abdominal pain; ROS otherwise negative   (14 Nov 2022 08:13)      INFECTIOUS DISEASE HISTORY:  History as above.  He has been on cefepime post HD as recommended was last seen.   Underwent debridement on 11/29 - found to have infected non viable soft tissue and bone in the left foot         PAST MEDICAL & SURGICAL HISTORY:  CAD (coronary artery disease)  1 stent 2008      S/P CABG (coronary artery bypass graft)  x4      Diabetes mellitus      Transient ischemic attack (TIA)  2017; 2008      Chronic kidney disease (CKD)  Stage IV      Hypertension      Stented coronary artery  in 2008      Myocardial infarction  2012      PAD (peripheral artery disease)  S/p bypass left leg      HLD (hyperlipidemia)      BPH (benign prostatic hyperplasia)      Pain in left knee  s/p fall      OA (osteoarthritis)      [place] (hard of hearing)      Chronic anemia      S/P CABG (coronary artery bypass graft)  2012      H/O arterial bypass of lower limb  Left Lower Extremity (2016)      History of surgery  Left CEA (2017)  Left Pinkie toe Amputation (2014)  CABG x 4 (2012)  Card cath - stent (2008)        AV fistula  2019  LEFT AV FISTULA          FAMILY HISTORY  Family history of heart disease (Father)    DM (diabetes mellitus) (Mother)    ESRD (end stage renal disease) on dialysis (Mother)        SOCIAL HISTORY  Social History:  quit smoking > 20 years ago  no alcohol misuse  lives at NH  ambulates with walker (14 Nov 2022 08:13)        ROS  General: Denies rigors, nightsweats  HEENT: Denies headache, rhinorrhea, sore throat, eye pain  CV: Denies CP, palpitations  PULM: Denies wheezing, hemoptysis  GI: Denies hematemesis, hematochezia, melena  : Denies discharge, hematuria  MSK: Denies arthralgias, myalgias  SKIN: Denies rash, lesions  NEURO: Denies paresthesias, weakness  PSYCH: Denies depression, anxiety    VITALS:  T(F): 98.6, Max: 98.6 (12-02-22 @ 08:00)  HR: 80  BP: 151/82  RR: 16Vital Signs Last 24 Hrs  T(C): 37 (02 Dec 2022 08:00), Max: 37 (02 Dec 2022 08:00)  T(F): 98.6 (02 Dec 2022 08:00), Max: 98.6 (02 Dec 2022 08:00)  HR: 80 (02 Dec 2022 11:50) (80 - 85)  BP: 151/82 (02 Dec 2022 11:50) (131/68 - 157/72)  BP(mean): --  RR: 16 (02 Dec 2022 11:50) (16 - 18)  SpO2: 98% (02 Dec 2022 05:01) (98% - 99%)    Parameters below as of 02 Dec 2022 11:50  Patient On (Oxygen Delivery Method): room air        PHYSICAL EXAM:  Gen: NAD, resting in bed  HEENT: Normocephalic, atraumatic  Neck: supple, no lymphadenopathy  CV: Regular rate & regular rhythm  Lungs: decreased BS at bases, no fremitus  Abdomen: Soft, BS present  Ext: Warm, well perfused  Neuro: non focal, awake  Skin: left foot dressed  Lines: no phlebitis    TESTS & MEASUREMENTS:                        8.4    6.02  )-----------( 221      ( 02 Dec 2022 12:43 )             25.0     12-02    141  |  99  |  31<H>  ----------------------------<  100<H>  3.8   |  29  |  3.4<H>    Ca    8.3<L>      02 Dec 2022 12:43  Mg     1.9     12-02    TPro  7.5  /  Alb  3.7  /  TBili  0.3  /  DBili  x   /  AST  7   /  ALT  <5  /  AlkPhos  173<H>  12-02      LIVER FUNCTIONS - ( 02 Dec 2022 12:43 )  Alb: 3.7 g/dL / Pro: 7.5 g/dL / ALK PHOS: 173 U/L / ALT: <5 U/L / AST: 7 U/L / GGT: x               Culture - Other (collected 11-29-22 @ 22:03)  Source: .Other None  Final Report (12-02-22 @ 16:54):    Moderate Escherichia coli ESBL    Normal skin sirisha isolated  Organism: Escherichia coli ESBL (12-02-22 @ 16:54)  Organism: Escherichia coli ESBL (12-02-22 @ 16:54)      -  Amikacin: S <=16      -  Amoxicillin/Clavulanic Acid: I 16/8      -  Ampicillin: R >16 These ampicillin results predict results for amoxicillin      -  Ampicillin/Sulbactam: R >16/8 Enterobacter, Klebsiella aerogenes, Citrobacter, and Serratia may develop resistance during prolonged therapy (3-4 days)      -  Aztreonam: R >16      -  Cefazolin: R >16 Enterobacter, Klebsiella aerogenes, Citrobacter, and Serratia may develop resistance during prolonged therapy (3-4 days)      -  Cefepime: R >16      -  Ceftriaxone: R >32 Enterobacter, Klebsiella aerogenes, Citrobacter, and Serratia may develop resistance during prolonged therapy      -  Ciprofloxacin: R >2      -  Ertapenem: S <=0.5      -  Gentamicin: S <=2      -  Imipenem: S <=1      -  Levofloxacin: R >4      -  Meropenem: S <=1      -  Piperacillin/Tazobactam: R <=8      -  Tobramycin: S <=2      -  Trimethoprim/Sulfamethoxazole: S <=0.5/9.5      Method Type: CONNER    Culture - Other (collected 11-29-22 @ 22:00)  Source: .Other None  Final Report (12-02-22 @ 16:12):    Numerous Escherichia coli ESBL    Rare Proteus vulgaris group    Normal skin sirisha isolated  Organism: Escherichia coli ESBL  Proteus vulgaris group (12-02-22 @ 16:12)  Organism: Proteus vulgaris group (12-02-22 @ 16:12)      -  Amikacin: S <=16      -  Amoxicillin/Clavulanic Acid: S <=8/4      -  Ampicillin: R >16 These ampicillin results predict results for amoxicillin      -  Ampicillin/Sulbactam: S <=4/2 Enterobacter, Klebsiella aerogenes, Citrobacter, and Serratia may develop resistance during prolonged therapy (3-4 days)      -  Aztreonam: S <=4      -  Cefazolin: R 8 Enterobacter, Klebsiella aerogenes, Citrobacter, and Serratia may develop resistance during prolonged therapy (3-4 days)      -  Cefepime: S <=2      -  Cefoxitin: S <=8      -  Ceftriaxone: S <=1 Enterobacter, Klebsiella aerogenes, Citrobacter, and Serratia may develop resistance during prolonged therapy      -  Ciprofloxacin: S <=0.25      -  Ertapenem: S <=0.5      -  Gentamicin: S <=2      -  Levofloxacin: S <=0.5      -  Meropenem: S <=1      -  Piperacillin/Tazobactam: S <=8      -  Tobramycin: S <=2      -  Trimethoprim/Sulfamethoxazole: S <=0.5/9.5      Method Type: CONNER  Organism: Escherichia coli ESBL (12-02-22 @ 16:12)      -  Amikacin: S <=16      -  Amoxicillin/Clavulanic Acid: I 16/8      -  Ampicillin: R >16 These ampicillin results predict results for amoxicillin      -  Ampicillin/Sulbactam: R >16/8 Enterobacter, Klebsiella aerogenes, Citrobacter, and Serratia may develop resistance during prolonged therapy (3-4 days)      -  Aztreonam: R >16      -  Cefazolin: R >16 Enterobacter, Klebsiella aerogenes, Citrobacter, and Serratia may develop resistance during prolonged therapy (3-4 days)      -  Cefepime: R >16      -  Ceftriaxone: R >32 Enterobacter, Klebsiella aerogenes, Citrobacter, and Serratia may develop resistance during prolonged therapy      -  Ciprofloxacin: R >2      -  Ertapenem: S <=0.5      -  Gentamicin: S <=2      -  Imipenem: S <=1      -  Levofloxacin: R >4      -  Meropenem: S <=1      -  Piperacillin/Tazobactam: R 32      -  Tobramycin: S <=2      -  Trimethoprim/Sulfamethoxazole: S 1/19      Method Type: CONNER            INFECTIOUS DISEASES TESTING  COVID-19 PCR: Detected (11-27-22 @ 13:50)  COVID-19 PCR: Detected (11-25-22 @ 17:41)  COVID-19 PCR: NotDetec (11-24-22 @ 04:25)  COVID-19 PCR: NotDetec (11-13-22 @ 21:56)  COVID-19 PCR: NotDetec (11-07-22 @ 14:02)  Hepatitis B Surface Antigen: Nonreact (11-03-22 @ 06:40)  COVID-19 PCR: NotDetec (11-02-22 @ 14:52)  COVID-19 PCR: NotDetec (10-30-22 @ 12:40)  Hepatitis B Surface Antigen: Nonreact (10-27-22 @ 04:09)  Hepatitis C Virus Interpretation: Nonreact (10-27-22 @ 04:09)  COVID-19 PCR: NotDetec (10-26-22 @ 13:06)  COVID-19 PCR: NotDetec (10-17-22 @ 20:19)  COVID-19 PCR: Detected (09-20-22 @ 12:50)  COVID-19 PCR: NotDetec (09-14-22 @ 13:45)  COVID-19 PCR: NotDetec (09-06-22 @ 01:35)  COVID-19 PCR: NotDetec (03-02-22 @ 14:15)  COVID-19 PCR: NotDetec (02-27-22 @ 15:30)  COVID-19 PCR: NotDetec (02-23-22 @ 11:50)  COVID-19 PCR: NotDetec (02-21-22 @ 10:25)      RADIOLOGY & ADDITIONAL TESTS:  I have personally reviewed the last Chest xray  CXR      CT      CARDIOLOGY TESTING      MEDICATIONS  aspirin enteric coated 81 Oral daily  atorvastatin 40 Oral at bedtime  cefepime   IVPB 2000 IV Intermittent <User Schedule>  clopidogrel Tablet 75 Oral daily  cyanocobalamin 1000 Oral daily  darbepoetin Injectable Syringe 60 IV Push <User Schedule>  dextrose 5%. 1000 IV Continuous <Continuous>  dextrose 5%. 1000 IV Continuous <Continuous>  dextrose 50% Injectable 25 IV Push once  dextrose 50% Injectable 12.5 IV Push once  dextrose 50% Injectable 25 IV Push once  famotidine    Tablet 20 Oral daily  folic acid 1 Oral daily  furosemide    Tablet 40 Oral daily  glucagon  Injectable 1 IntraMuscular once  guaifenesin/dextromethorphan Oral Liquid 5 Oral three times a day  heparin   Injectable 5000 SubCutaneous every 8 hours  influenza   Vaccine 0.5 IntraMuscular once  insulin glargine Injectable (LANTUS) 13 SubCutaneous at bedtime  insulin lispro (ADMELOG) corrective regimen sliding scale  SubCutaneous three times a day before meals  insulin lispro Injectable (ADMELOG) 5 SubCutaneous before breakfast  insulin lispro Injectable (ADMELOG) 5 SubCutaneous before lunch  metoprolol tartrate 12.5 Oral every 12 hours  NIFEdipine XL 30 Oral daily  regadenoson Injectable 0.4 IV Push once  senna 2 Oral at bedtime  sevelamer carbonate 800 Oral three times a day with meals  tamsulosin 0.4 Oral at bedtime      Weight  Weight (kg): 90.8 (11-29-22 @ 17:20)    ANTIBIOTICS:  cefepime   IVPB 2000 milliGRAM(s) IV Intermittent <User Schedule>      ALLERGIES:  No Known Allergies        
Patient is a 62y old  Male who presents with a chief complaint of Chest pain (16 Nov 2022 09:28)      HPI:  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 and cefepime post dialysis s.p wound Vac, chronic anemia, HFmrEF, presented to the hospital for chest pain   Pain is mid sternal, pressure like, discomfort rather than pain, occurring at rest, non radiating, exacerbated by cough, not similar to previous episodes of chest pain, not associated with tremor , diaphoresis, numbness, lightheadedness; Pain has improved with tylenol initially and then resolved with nitroglycerin SL   Patient denies fever, chills, shortness of breath, sputum, abdominal pain; ROS otherwise negative   (14 Nov 2022 08:13)      PAST MEDICAL & SURGICAL HISTORY:  CAD (coronary artery disease)  1 stent 2008      S/P CABG (coronary artery bypass graft)  x4      Diabetes mellitus      Transient ischemic attack (TIA)  2017; 2008      Chronic kidney disease (CKD)  Stage IV      Hypertension      Stented coronary artery  in 2008      Myocardial infarction  2012      PAD (peripheral artery disease)  S/p bypass left leg      HLD (hyperlipidemia)      BPH (benign prostatic hyperplasia)      Pain in left knee  s/p fall      OA (osteoarthritis)      [place] (hard of hearing)      Chronic anemia      S/P CABG (coronary artery bypass graft)  2012      H/O arterial bypass of lower limb  Left Lower Extremity (2016)      History of surgery  Left CEA (2017)  Left Pinkie toe Amputation (2014)  CABG x 4 (2012)  Card cath - stent (2008)        AV fistula  2019  LEFT AV FISTULA          PREVIOUS DIAGNOSTIC TESTING:      ECHO  FINDINGS:    STRESS  FINDINGS:    CATHETERIZATION  FINDINGS:    MEDICATIONS  (STANDING):  aspirin enteric coated 81 milliGRAM(s) Oral daily  atorvastatin 40 milliGRAM(s) Oral at bedtime  cefepime   IVPB 2000 milliGRAM(s) IV Intermittent <User Schedule>  clopidogrel Tablet 75 milliGRAM(s) Oral daily  darbepoetin Injectable Syringe 60 MICROGram(s) IV Push <User Schedule>  dextrose 5%. 1000 milliLiter(s) (100 mL/Hr) IV Continuous <Continuous>  dextrose 5%. 1000 milliLiter(s) (50 mL/Hr) IV Continuous <Continuous>  dextrose 50% Injectable 25 Gram(s) IV Push once  dextrose 50% Injectable 12.5 Gram(s) IV Push once  dextrose 50% Injectable 25 Gram(s) IV Push once  famotidine    Tablet 20 milliGRAM(s) Oral daily  furosemide    Tablet 40 milliGRAM(s) Oral daily  glucagon  Injectable 1 milliGRAM(s) IntraMuscular once  heparin   Injectable 5000 Unit(s) SubCutaneous every 8 hours  influenza   Vaccine 0.5 milliLiter(s) IntraMuscular once  insulin glargine Injectable (LANTUS) 30 Unit(s) SubCutaneous at bedtime  insulin lispro (ADMELOG) corrective regimen sliding scale   SubCutaneous three times a day before meals  insulin lispro Injectable (ADMELOG) 10 Unit(s) SubCutaneous three times a day before meals  NIFEdipine XL 30 milliGRAM(s) Oral daily  senna 2 Tablet(s) Oral at bedtime  sevelamer carbonate 800 milliGRAM(s) Oral three times a day with meals  tamsulosin 0.4 milliGRAM(s) Oral at bedtime    MEDICATIONS  (PRN):  acetaminophen     Tablet .. 650 milliGRAM(s) Oral every 6 hours PRN Mild Pain (1 - 3)  dextrose Oral Gel 15 Gram(s) Oral once PRN Blood Glucose LESS THAN 70 milliGRAM(s)/deciliter      FAMILY HISTORY:  Family history of heart disease (Father)    DM (diabetes mellitus) (Mother)    ESRD (end stage renal disease) on dialysis (Mother)        SOCIAL HISTORY:  CIGARETTES:    ALCOHOL:    REVIEW OF SYSTEMS:  CONSTITUTIONAL: No fever, weight loss, or fatigue  NECK: No pain or stiffness  RESPIRATORY: No cough, wheezing, chills or hemoptysis; No shortness of breath  CARDIOVASCULAR: No chest pain, palpitations, dizziness, or leg swelling  GASTROINTESTINAL: No abdominal or epigastric pain. No nausea, vomiting, or hematemesis; No diarrhea or constipation. No melena or hematochezia.  GENITOURINARY: No dysuria, frequency, hematuria, or incontinence  NEUROLOGICAL: No headaches, memory loss, loss of strength, numbness, or tremors  SKIN: No itching, burning, rashes, or lesions   ENDOCRINE: No heat or cold intolerance; No hair loss  MUSCULOSKELETAL: No joint pain or swelling; No muscle, back, or extremity pain  HEME/LYMPH: No easy bruising, or bleeding gums          Vital Signs Last 24 Hrs  T(C): 36 (16 Nov 2022 04:51), Max: 36.1 (15 Nov 2022 16:20)  T(F): 96.8 (16 Nov 2022 04:51), Max: 97 (15 Nov 2022 16:20)  HR: 94 (16 Nov 2022 08:50) (90 - 97)  BP: 152/79 (16 Nov 2022 08:50) (119/60 - 152/79)  BP(mean): 84 (15 Nov 2022 16:20) (84 - 84)  RR: 18 (16 Nov 2022 08:50) (18 - 18)  SpO2: 95% (16 Nov 2022 08:02) (95% - 98%)    Parameters below as of 16 Nov 2022 08:50  Patient On (Oxygen Delivery Method): room air            PHYSICAL EXAM:  GENERAL: NAD, well-groomed, well-developed  HEAD:  Atraumatic, Normocephalic  NECK: Supple, No JVD, Normal thyroid  NERVOUS SYSTEM:  Alert & Oriented X3, Good concentration  CHEST/LUNG: Clear to percussion bilaterally; No rales, rhonchi, wheezing, or rubs  HEART: Regular rate and rhythm; No murmurs, rubs, or gallops  ABDOMEN: Soft, Nontender, Nondistended; Bowel sounds present  EXTREMITIES:  2+ Peripheral Pulses, No clubbing, cyanosis, or edema  SKIN: No rashes or lesions    INTERPRETATION OF TELEMETRY:    ECG:    I&O's Detail    16 Nov 2022 07:01  -  16 Nov 2022 11:19  --------------------------------------------------------  IN:    Oral Fluid: 210 mL  Total IN: 210 mL    OUT:  Total OUT: 0 mL    Total NET: 210 mL          LABS:                        7.8    7.37  )-----------( 271      ( 15 Nov 2022 06:57 )             23.2     11-15    134<L>  |  94<L>  |  43<H>  ----------------------------<  77  3.7   |  27  |  4.9<HH>    Ca    8.1<L>      15 Nov 2022 06:57  Phos  3.6     11-15  Mg     2.0     11-15      CARDIAC MARKERS ( 15 Nov 2022 06:57 )  x     / 0.79 ng/mL / 25 U/L / x     / x      CARDIAC MARKERS ( 14 Nov 2022 21:55 )  x     / 0.74 ng/mL / x     / x     / 2.3 ng/mL  CARDIAC MARKERS ( 14 Nov 2022 16:00 )  x     / 0.80 ng/mL / x     / x     / 2.8 ng/mL  CARDIAC MARKERS ( 14 Nov 2022 11:33 )  x     / 0.75 ng/mL / 35 U/L / x     / x              I&O's Summary    16 Nov 2022 07:01  -  16 Nov 2022 11:19  --------------------------------------------------------  IN: 210 mL / OUT: 0 mL / NET: 210 mL        RADIOLOGY & ADDITIONAL STUDIES:

## 2022-12-02 NOTE — PROGRESS NOTE ADULT - SUBJECTIVE AND OBJECTIVE BOX
Nephrology progress note    Patient is seen and examined on hd, events over the last 24 h noted .  NO sob FEELS GOOD  Allergies:  No Known Allergies    Hospital Medications:   MEDICATIONS  (STANDING):  aspirin enteric coated 81 milliGRAM(s) Oral daily  atorvastatin 40 milliGRAM(s) Oral at bedtime  cefepime   IVPB 2000 milliGRAM(s) IV Intermittent <User Schedule>  clopidogrel Tablet 75 milliGRAM(s) Oral daily  cyanocobalamin 1000 MICROGram(s) Oral daily  darbepoetin Injectable Syringe 60 MICROGram(s) IV Push <User Schedule>  dextrose 5%. 1000 milliLiter(s) (50 mL/Hr) IV Continuous <Continuous>  dextrose 5%. 1000 milliLiter(s) (100 mL/Hr) IV Continuous <Continuous>  dextrose 50% Injectable 25 Gram(s) IV Push once  dextrose 50% Injectable 12.5 Gram(s) IV Push once  dextrose 50% Injectable 25 Gram(s) IV Push once  famotidine    Tablet 20 milliGRAM(s) Oral daily  folic acid 1 milliGRAM(s) Oral daily  furosemide    Tablet 40 milliGRAM(s) Oral daily  glucagon  Injectable 1 milliGRAM(s) IntraMuscular once  guaifenesin/dextromethorphan Oral Liquid 5 milliLiter(s) Oral three times a day  heparin   Injectable 5000 Unit(s) SubCutaneous every 8 hours  influenza   Vaccine 0.5 milliLiter(s) IntraMuscular once  insulin glargine Injectable (LANTUS) 13 Unit(s) SubCutaneous at bedtime  insulin lispro (ADMELOG) corrective regimen sliding scale   SubCutaneous three times a day before meals  insulin lispro Injectable (ADMELOG) 5 Unit(s) SubCutaneous before breakfast  insulin lispro Injectable (ADMELOG) 5 Unit(s) SubCutaneous before lunch  metoprolol tartrate 12.5 milliGRAM(s) Oral every 12 hours  NIFEdipine XL 30 milliGRAM(s) Oral daily  regadenoson Injectable 0.4 milliGRAM(s) IV Push once  senna 2 Tablet(s) Oral at bedtime  sevelamer carbonate 800 milliGRAM(s) Oral three times a day with meals  tamsulosin 0.4 milliGRAM(s) Oral at bedtime        VITALS:  T(F): 98.6 (12-02-22 @ 08:00), Max: 98.6 (12-02-22 @ 08:00)  HR: 85 (12-02-22 @ 08:50)  BP: 131/68 (12-02-22 @ 08:50)  RR: 16 (12-02-22 @ 08:50)  SpO2: 98% (12-02-22 @ 05:01)  Wt(kg): --    11-30 @ 07:01  -  12-01 @ 07:00  --------------------------------------------------------  IN: 250 mL / OUT: 3000 mL / NET: -2750 mL    12-01 @ 07:01  -  12-02 @ 07:00  --------------------------------------------------------  IN: 750 mL / OUT: 0 mL / NET: 750 mL          PHYSICAL EXAM:  Constitutional: NAD  HEENT: anicteric sclera  Neck: No JVD  Respiratory: CTA  Cardiovascular: S1, S2, RRR  Gastrointestinal: BS+, soft, NT/ND  Extremities: No peripheral edema  Neurological: A/O x 3  : No CVA tenderness. No schneider.   Skin: No rashes  Vascular Access: AVF    LABS:  12-01    135  |  96<L>  |  44<H>  ----------------------------<  87  4.7   |  27  |  4.6<HH>    Ca    8.2<L>      01 Dec 2022 07:42  Mg     1.8     12-01    TPro  6.7  /  Alb  3.2<L>  /  TBili  0.3  /  DBili  <0.2  /  AST  7   /  ALT  <5  /  AlkPhos  136<H>  12-01                          7.8    5.22  )-----------( 182      ( 01 Dec 2022 07:42 )             23.9       Urine Studies:      RADIOLOGY & ADDITIONAL STUDIES:

## 2022-12-02 NOTE — PROGRESS NOTE ADULT - ASSESSMENT
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 and cefepime post dialysis s.p wound Vac, chronic anemia, HFmrEF, presented to the hospital for chest pain.     #ACS/CAD patient presenting with Chest pain-resolved   #CAD s/p PCI/CABG  #PAD s/p angioplasty  #HTN/HLD  #Chronic HFmrEF  - Troponin 0.70 in setting of ESRD, 0.25 back in 09/2022  - Troponin stable between 0.7-0.8  - TTE LVEF 45-50%, Grade 3 diastolic dysfunction  - Nuclear stress test: FIXED PERFUSION DEFECT INVOLVING ANTERIOR WALL OF THE LEFT VENTRICLE EXTENDING TO APEX CONSISTENT WITH SCAR. GLOBAL HYPOKINESIS WITH POOR THICKENING OF THE ANTERIOR WALL. EF  36 %   - c/w ASA 81 mg daily  - c/w Plavix 75 mg daily  - c/w atorvastatin 40 mg daily  - c/w Nifedipine 30 mg daily  - EKG: Left anterior fascicular block ST & T wave abnormality, consider lateral ischemia. Prolonged QT    #Recent hx Left calcaneal OM   - Wound Cx 9/16 - Enterobacter cloacae  - wound CX 10/16 VRE Faecium Enterobacter cloacae complex, Proteus vulgaris, Morganella morganii   - s/p debridement 10/21 Wound Cx Proteus mirabilis, VRE faecium, Pseudomonas putida  - s/p debridement 10/24 Crumble L calcaneous   - s/p debridement 11/3 with wound vac in place  - Cefepime 2g post HD through 11/30; s/p 3 session of daptomycin post-HD  - On last admission, ID plan for 4 weeks from last debridement (11/3-11/30). Antibiotic course completed. ID was contacted again today 12/02/22 regarding the need of antibiotics on discharge. ID recommends to wait for sensitivity reports for proteus bacteria and ESR/CRP  - Debridement cancelled again-rescheduled for 11/29/30---performed. No further podiatry recommendations needed       #Covid+  -pt tested + for covid on 11/26   -started on remdesavir  -fu chest xray  -isolation protocol    #NSVT  -11/16 PM 10 beats of NSVT observed on tele   -Started Metoprolol 12.5 BID  -Maintain K>4 and Mg>2    # Anemia of chronic disease  - hb on admission 6.6 baseline 7.6. s/p 1U PRBC  -11/21 pt got 1 unit pRBCs   - Monitor CBC, transfuse to keep >8  -maintain active T&S     # ESRD on HD   - HD per nephrology  - B/L pitting edema, little urine output       # PAD  - s/p angiogram LLE with peroneal artery angioplasty and arterectomy 10/27  - Cont meds as above    # Type 2 DM  -Continue insulin regimen    # BPH  - c/w Tamsulosin 0.4 mg daily      -DVT prophylaxis: Heparin  -GI prophylaxis: Not indicated  -Diet: Renal, 1.2l fluid restriction  -Code status: Full code

## 2022-12-02 NOTE — PROGRESS NOTE ADULT - ASSESSMENT
63 yo male, h/o type 2 DM on insulin, CAD s.p stent 2008, s/p CABG 2012 (Dr Smith), PAD s/p angioplasty and stent b/l LE, BPH, ESRD on HD through left AVF (MWF follows with Dr MARY Paula), DFU sp left toe amputation, left calcaneal OM on daptomycin and cefepime post dialysis s.p wound Vac, chronic anemia, HFmrEF, presented to the hospital for chest pain. Found to have anemia s/p 1 unit of prbc.    ESRD on HD/ Anemia / Chest pain/ OM on abx  HD today 3H uf 3L as tolerated   RAMSEY weekly post HD 60 mcg/ transfuse if Hb< 7  on sevelamer 800 mg po tid/ phos at goal /   on lasix non oliguric    BP controlled  abx for OM - Cefepime  s/p debridement with podiatry 11/21 and 11/29 has calcaneal OM - ABx completed 11/30/22 as per ID    will follow

## 2022-12-03 LAB
ALBUMIN SERPL ELPH-MCNC: 3.4 G/DL — LOW (ref 3.5–5.2)
ALBUMIN SERPL ELPH-MCNC: 3.4 G/DL — LOW (ref 3.5–5.2)
ALP SERPL-CCNC: 142 U/L — HIGH (ref 30–115)
ALP SERPL-CCNC: 144 U/L — HIGH (ref 30–115)
ALT FLD-CCNC: <5 U/L — SIGNIFICANT CHANGE UP (ref 0–41)
ALT FLD-CCNC: <5 U/L — SIGNIFICANT CHANGE UP (ref 0–41)
ANION GAP SERPL CALC-SCNC: 12 MMOL/L — SIGNIFICANT CHANGE UP (ref 7–14)
AST SERPL-CCNC: 5 U/L — SIGNIFICANT CHANGE UP (ref 0–41)
AST SERPL-CCNC: <5 U/L — SIGNIFICANT CHANGE UP (ref 0–41)
BASOPHILS # BLD AUTO: 0.05 K/UL — SIGNIFICANT CHANGE UP (ref 0–0.2)
BASOPHILS NFR BLD AUTO: 0.8 % — SIGNIFICANT CHANGE UP (ref 0–1)
BILIRUB DIRECT SERPL-MCNC: <0.2 MG/DL — SIGNIFICANT CHANGE UP (ref 0–0.3)
BILIRUB INDIRECT FLD-MCNC: >0 MG/DL — LOW (ref 0.2–1.2)
BILIRUB SERPL-MCNC: 0.2 MG/DL — SIGNIFICANT CHANGE UP (ref 0.2–1.2)
BILIRUB SERPL-MCNC: 0.2 MG/DL — SIGNIFICANT CHANGE UP (ref 0.2–1.2)
BUN SERPL-MCNC: 42 MG/DL — HIGH (ref 10–20)
CALCIUM SERPL-MCNC: 7.9 MG/DL — LOW (ref 8.4–10.5)
CHLORIDE SERPL-SCNC: 95 MMOL/L — LOW (ref 98–110)
CO2 SERPL-SCNC: 27 MMOL/L — SIGNIFICANT CHANGE UP (ref 17–32)
CREAT SERPL-MCNC: 4.5 MG/DL — CRITICAL HIGH (ref 0.7–1.5)
EGFR: 14 ML/MIN/1.73M2 — LOW
EOSINOPHIL # BLD AUTO: 0.12 K/UL — SIGNIFICANT CHANGE UP (ref 0–0.7)
EOSINOPHIL NFR BLD AUTO: 1.9 % — SIGNIFICANT CHANGE UP (ref 0–8)
GLUCOSE BLDC GLUCOMTR-MCNC: 100 MG/DL — HIGH (ref 70–99)
GLUCOSE BLDC GLUCOMTR-MCNC: 135 MG/DL — HIGH (ref 70–99)
GLUCOSE BLDC GLUCOMTR-MCNC: 137 MG/DL — HIGH (ref 70–99)
GLUCOSE BLDC GLUCOMTR-MCNC: 182 MG/DL — HIGH (ref 70–99)
GLUCOSE BLDC GLUCOMTR-MCNC: 240 MG/DL — HIGH (ref 70–99)
GLUCOSE BLDC GLUCOMTR-MCNC: 58 MG/DL — LOW (ref 70–99)
GLUCOSE SERPL-MCNC: 82 MG/DL — SIGNIFICANT CHANGE UP (ref 70–99)
HCT VFR BLD CALC: 24.6 % — LOW (ref 42–52)
HGB BLD-MCNC: 7.9 G/DL — LOW (ref 14–18)
IMM GRANULOCYTES NFR BLD AUTO: 0.2 % — SIGNIFICANT CHANGE UP (ref 0.1–0.3)
INR BLD: 1.28 RATIO — SIGNIFICANT CHANGE UP (ref 0.65–1.3)
LYMPHOCYTES # BLD AUTO: 0.71 K/UL — LOW (ref 1.2–3.4)
LYMPHOCYTES # BLD AUTO: 11.5 % — LOW (ref 20.5–51.1)
MAGNESIUM SERPL-MCNC: 1.9 MG/DL — SIGNIFICANT CHANGE UP (ref 1.8–2.4)
MCHC RBC-ENTMCNC: 29 PG — SIGNIFICANT CHANGE UP (ref 27–31)
MCHC RBC-ENTMCNC: 32.1 G/DL — SIGNIFICANT CHANGE UP (ref 32–37)
MCV RBC AUTO: 90.4 FL — SIGNIFICANT CHANGE UP (ref 80–94)
MONOCYTES # BLD AUTO: 0.31 K/UL — SIGNIFICANT CHANGE UP (ref 0.1–0.6)
MONOCYTES NFR BLD AUTO: 5 % — SIGNIFICANT CHANGE UP (ref 1.7–9.3)
NEUTROPHILS # BLD AUTO: 4.99 K/UL — SIGNIFICANT CHANGE UP (ref 1.4–6.5)
NEUTROPHILS NFR BLD AUTO: 80.6 % — HIGH (ref 42.2–75.2)
NRBC # BLD: 0 /100 WBCS — SIGNIFICANT CHANGE UP (ref 0–0)
PLATELET # BLD AUTO: 210 K/UL — SIGNIFICANT CHANGE UP (ref 130–400)
POTASSIUM SERPL-MCNC: 4.4 MMOL/L — SIGNIFICANT CHANGE UP (ref 3.5–5)
POTASSIUM SERPL-SCNC: 4.4 MMOL/L — SIGNIFICANT CHANGE UP (ref 3.5–5)
PROT SERPL-MCNC: 6.9 G/DL — SIGNIFICANT CHANGE UP (ref 6–8)
PROT SERPL-MCNC: 7 G/DL — SIGNIFICANT CHANGE UP (ref 6–8)
PROTHROM AB SERPL-ACNC: 14.7 SEC — HIGH (ref 9.95–12.87)
RBC # BLD: 2.72 M/UL — LOW (ref 4.7–6.1)
RBC # FLD: 15.9 % — HIGH (ref 11.5–14.5)
SODIUM SERPL-SCNC: 134 MMOL/L — LOW (ref 135–146)
WBC # BLD: 6.19 K/UL — SIGNIFICANT CHANGE UP (ref 4.8–10.8)
WBC # FLD AUTO: 6.19 K/UL — SIGNIFICANT CHANGE UP (ref 4.8–10.8)

## 2022-12-03 PROCEDURE — 99231 SBSQ HOSP IP/OBS SF/LOW 25: CPT

## 2022-12-03 RX ORDER — GENTAMICIN SULFATE 40 MG/ML
80 VIAL (ML) INJECTION
Refills: 0 | Status: DISCONTINUED | OUTPATIENT
Start: 2022-12-05 | End: 2022-12-07

## 2022-12-03 RX ORDER — INSULIN GLARGINE 100 [IU]/ML
10 INJECTION, SOLUTION SUBCUTANEOUS AT BEDTIME
Refills: 0 | Status: DISCONTINUED | OUTPATIENT
Start: 2022-12-03 | End: 2022-12-08

## 2022-12-03 RX ADMIN — Medication 2: at 17:17

## 2022-12-03 RX ADMIN — ATORVASTATIN CALCIUM 40 MILLIGRAM(S): 80 TABLET, FILM COATED ORAL at 21:54

## 2022-12-03 RX ADMIN — Medication 12.5 MILLIGRAM(S): at 17:18

## 2022-12-03 RX ADMIN — FAMOTIDINE 20 MILLIGRAM(S): 10 INJECTION INTRAVENOUS at 12:29

## 2022-12-03 RX ADMIN — Medication 5 MILLILITER(S): at 06:33

## 2022-12-03 RX ADMIN — HEPARIN SODIUM 5000 UNIT(S): 5000 INJECTION INTRAVENOUS; SUBCUTANEOUS at 12:32

## 2022-12-03 RX ADMIN — Medication 81 MILLIGRAM(S): at 12:29

## 2022-12-03 RX ADMIN — Medication 5 MILLILITER(S): at 21:54

## 2022-12-03 RX ADMIN — Medication 30 MILLIGRAM(S): at 06:33

## 2022-12-03 RX ADMIN — HEPARIN SODIUM 5000 UNIT(S): 5000 INJECTION INTRAVENOUS; SUBCUTANEOUS at 06:33

## 2022-12-03 RX ADMIN — Medication 1 MILLIGRAM(S): at 12:30

## 2022-12-03 RX ADMIN — SEVELAMER CARBONATE 800 MILLIGRAM(S): 2400 POWDER, FOR SUSPENSION ORAL at 17:17

## 2022-12-03 RX ADMIN — HEPARIN SODIUM 5000 UNIT(S): 5000 INJECTION INTRAVENOUS; SUBCUTANEOUS at 21:54

## 2022-12-03 RX ADMIN — TAMSULOSIN HYDROCHLORIDE 0.4 MILLIGRAM(S): 0.4 CAPSULE ORAL at 21:53

## 2022-12-03 RX ADMIN — SEVELAMER CARBONATE 800 MILLIGRAM(S): 2400 POWDER, FOR SUSPENSION ORAL at 12:29

## 2022-12-03 RX ADMIN — PREGABALIN 1000 MICROGRAM(S): 225 CAPSULE ORAL at 12:30

## 2022-12-03 RX ADMIN — Medication 12.5 MILLIGRAM(S): at 06:33

## 2022-12-03 RX ADMIN — CLOPIDOGREL BISULFATE 75 MILLIGRAM(S): 75 TABLET, FILM COATED ORAL at 12:29

## 2022-12-03 RX ADMIN — INSULIN GLARGINE 10 UNIT(S): 100 INJECTION, SOLUTION SUBCUTANEOUS at 20:14

## 2022-12-03 RX ADMIN — Medication 40 MILLIGRAM(S): at 06:33

## 2022-12-03 RX ADMIN — Medication 5 MILLILITER(S): at 12:30

## 2022-12-03 RX ADMIN — SEVELAMER CARBONATE 800 MILLIGRAM(S): 2400 POWDER, FOR SUSPENSION ORAL at 12:26

## 2022-12-03 RX ADMIN — SENNA PLUS 2 TABLET(S): 8.6 TABLET ORAL at 21:53

## 2022-12-03 NOTE — PROGRESS NOTE ADULT - SUBJECTIVE AND OBJECTIVE BOX
24H events:    Patient is a 62y old Male who presents with a chief complaint of Chest pain (03 Dec 2022 06:55)  Primary diagnosis of Osteomyelitis of ankle or foot, acute  Today is hospital day 19d.    Patient seen and examined at bedside. Reports no active complaints.     PAST MEDICAL & SURGICAL HISTORY  CAD (coronary artery disease)  1 stent 2008    S/P CABG (coronary artery bypass graft)  x4    Diabetes mellitus    Transient ischemic attack (TIA)  2017; 2008    Chronic kidney disease (CKD)  Stage IV    Hypertension    Stented coronary artery  in 2008    Myocardial infarction  2012    PAD (peripheral artery disease)  S/p bypass left leg    HLD (hyperlipidemia)    BPH (benign prostatic hyperplasia)    Pain in left knee  s/p fall    OA (osteoarthritis)    [place] (hard of hearing)    Chronic anemia    S/P CABG (coronary artery bypass graft)  2012    H/O arterial bypass of lower limb  Left Lower Extremity (2016)    History of surgery  Left CEA (2017)  Left Pinkie toe Amputation (2014)  CABG x 4 (2012)  Card cath - stent (2008)      AV fistula  2019  LEFT AV FISTULA      SOCIAL HISTORY:  Negative for smoking/alcohol/drug use.     ALLERGIES:  No Known Allergies    MEDICATIONS:  STANDING MEDICATIONS  aspirin enteric coated 81 milliGRAM(s) Oral daily  atorvastatin 40 milliGRAM(s) Oral at bedtime  cefepime   IVPB 2000 milliGRAM(s) IV Intermittent <User Schedule>  clopidogrel Tablet 75 milliGRAM(s) Oral daily  cyanocobalamin 1000 MICROGram(s) Oral daily  darbepoetin Injectable Syringe 60 MICROGram(s) IV Push <User Schedule>  dextrose 5%. 1000 milliLiter(s) IV Continuous <Continuous>  dextrose 5%. 1000 milliLiter(s) IV Continuous <Continuous>  dextrose 50% Injectable 25 Gram(s) IV Push once  dextrose 50% Injectable 12.5 Gram(s) IV Push once  dextrose 50% Injectable 25 Gram(s) IV Push once  famotidine    Tablet 20 milliGRAM(s) Oral daily  folic acid 1 milliGRAM(s) Oral daily  furosemide    Tablet 40 milliGRAM(s) Oral daily  glucagon  Injectable 1 milliGRAM(s) IntraMuscular once  guaifenesin/dextromethorphan Oral Liquid 5 milliLiter(s) Oral three times a day  heparin   Injectable 5000 Unit(s) SubCutaneous every 8 hours  influenza   Vaccine 0.5 milliLiter(s) IntraMuscular once  insulin glargine Injectable (LANTUS) 10 Unit(s) SubCutaneous at bedtime  insulin lispro (ADMELOG) corrective regimen sliding scale   SubCutaneous three times a day before meals  insulin lispro Injectable (ADMELOG) 5 Unit(s) SubCutaneous before breakfast  insulin lispro Injectable (ADMELOG) 5 Unit(s) SubCutaneous before lunch  metoprolol tartrate 12.5 milliGRAM(s) Oral every 12 hours  NIFEdipine XL 30 milliGRAM(s) Oral daily  regadenoson Injectable 0.4 milliGRAM(s) IV Push once  senna 2 Tablet(s) Oral at bedtime  sevelamer carbonate 800 milliGRAM(s) Oral three times a day with meals  tamsulosin 0.4 milliGRAM(s) Oral at bedtime    PRN MEDICATIONS  acetaminophen     Tablet .. 650 milliGRAM(s) Oral every 6 hours PRN  dextrose Oral Gel 15 Gram(s) Oral once PRN  melatonin 3 milliGRAM(s) Oral at bedtime PRN    VITALS:   T(F): 96.3  HR: 88  BP: 145/68  RR: 18  SpO2: 96%    LABS:                        7.9    6.19  )-----------( 210      ( 03 Dec 2022 08:09 )             24.6     12-03    134<L>  |  95<L>  |  42<H>  ----------------------------<  82  4.4   |  27  |  4.5<HH>    Ca    7.9<L>      03 Dec 2022 08:09  Mg     1.9     12-03    TPro  6.9  /  Alb  3.4<L>  /  TBili  0.2  /  DBili  <0.2  /  AST  5   /  ALT  <5  /  AlkPhos  142<H>  12-03    PT/INR - ( 03 Dec 2022 08:09 )   PT: 14.70 sec;   INR: 1.28 ratio                       RADIOLOGY:    PHYSICAL EXAM:  GENERAL: NAD, well-groomed, well-developed  NERVOUS SYSTEM:  Alert & Oriented X3, Good concentration;  PULM: Clear to auscultation bilaterally  CARDIAC: Regular rate and rhythm  GI: Soft, Nontender, Nondistended; Bowel sounds present  EXTREMITIES:  left foot wrapper

## 2022-12-03 NOTE — PROGRESS NOTE ADULT - SUBJECTIVE AND OBJECTIVE BOX
SCHWABACHER, LAWRENCE  62y  [facility] F4-4B 018 B      Patient is a 62y old  Male who presents with a chief complaint of Chest pain (30 Nov 2022 13:29)      INTERVAL HPI/OVERNIGHT EVENTS:  Patient feels well. no overnight events       REVIEW OF SYSTEMS:        FAMILY HISTORY:  Family history of heart disease (Father)    DM (diabetes mellitus) (Mother)    ESRD (end stage renal disease) on dialysis (Mother)      T(C): 36.2 (11-30-22 @ 15:35), Max: 36.4 (11-29-22 @ 19:19)  HR: 88 (11-30-22 @ 15:35) (71 - 98)  BP: 133/60 (11-30-22 @ 15:35) (123/64 - 163/83)  RR: 20 (11-30-22 @ 15:35) (16 - 22)  SpO2: 95% (11-29-22 @ 20:00) (95% - 100%)  Wt(kg): --Vital Signs Last 24 Hrs  T(C): 36.2 (30 Nov 2022 15:35), Max: 36.4 (29 Nov 2022 19:19)  T(F): 97.2 (30 Nov 2022 15:35), Max: 97.5 (29 Nov 2022 19:19)  HR: 88 (30 Nov 2022 15:35) (71 - 98)  BP: 133/60 (30 Nov 2022 15:35) (123/64 - 163/83)  BP(mean): 104 (29 Nov 2022 17:20) (104 - 104)  RR: 20 (30 Nov 2022 15:35) (16 - 22)  SpO2: 95% (29 Nov 2022 20:00) (95% - 100%)    Parameters below as of 30 Nov 2022 11:15  Patient On (Oxygen Delivery Method): room air        PHYSICAL EXAM:  GENERAL: NAD, well-groomed, well-developed  NERVOUS SYSTEM:  Alert & Oriented X3, Good concentration;  PULM: Clear to auscultation bilaterally  CARDIAC: Regular rate and rhythm  GI: Soft, Nontender, Nondistended; Bowel sounds present  EXTREMITIES:  left foot wrapper     Consultant(s) Notes Reviewed:  [x ] YES  [ ] NO  Care Discussed with Consultants/Other Providers [ x] YES  [ ] NO    LABS:                            7.3    4.70  )-----------( 180      ( 30 Nov 2022 09:36 )             21.4   11-30    137  |  98  |  43<H>  ----------------------------<  154<H>  4.4   |  26  |  4.6<HH>    Ca    8.0<L>      30 Nov 2022 09:36  Mg     1.9     11-30    TPro  6.9  /  Alb  3.4<L>  /  TBili  0.2  /  DBili  <0.2  /  AST  6   /  ALT  <5  /  AlkPhos  141<H>  11-30            acetaminophen     Tablet .. 650 milliGRAM(s) Oral every 6 hours PRN  aspirin enteric coated 81 milliGRAM(s) Oral daily  atorvastatin 40 milliGRAM(s) Oral at bedtime  cefepime   IVPB 2000 milliGRAM(s) IV Intermittent <User Schedule>  clopidogrel Tablet 75 milliGRAM(s) Oral daily  cyanocobalamin 1000 MICROGram(s) Oral daily  darbepoetin Injectable Syringe 60 MICROGram(s) IV Push <User Schedule>  dextrose 5%. 1000 milliLiter(s) IV Continuous <Continuous>  dextrose 5%. 1000 milliLiter(s) IV Continuous <Continuous>  dextrose 50% Injectable 25 Gram(s) IV Push once  dextrose 50% Injectable 12.5 Gram(s) IV Push once  dextrose 50% Injectable 25 Gram(s) IV Push once  dextrose Oral Gel 15 Gram(s) Oral once PRN  famotidine    Tablet 20 milliGRAM(s) Oral daily  folic acid 1 milliGRAM(s) Oral daily  furosemide    Tablet 40 milliGRAM(s) Oral daily  glucagon  Injectable 1 milliGRAM(s) IntraMuscular once  guaifenesin/dextromethorphan Oral Liquid 5 milliLiter(s) Oral three times a day  heparin   Injectable 5000 Unit(s) SubCutaneous every 8 hours  influenza   Vaccine 0.5 milliLiter(s) IntraMuscular once  insulin glargine Injectable (LANTUS) 7 Unit(s) SubCutaneous at bedtime  insulin glargine Injectable (LANTUS) 13 Unit(s) SubCutaneous at bedtime  insulin lispro (ADMELOG) corrective regimen sliding scale   SubCutaneous three times a day before meals  insulin lispro Injectable (ADMELOG) 5 Unit(s) SubCutaneous before breakfast  insulin lispro Injectable (ADMELOG) 5 Unit(s) SubCutaneous before lunch  melatonin 3 milliGRAM(s) Oral at bedtime PRN  metoprolol tartrate 12.5 milliGRAM(s) Oral every 12 hours  NIFEdipine XL 30 milliGRAM(s) Oral daily  regadenoson Injectable 0.4 milliGRAM(s) IV Push once  remdesivir  IVPB 100 milliGRAM(s) IV Intermittent every 24 hours  remdesivir  IVPB   IV Intermittent   senna 2 Tablet(s) Oral at bedtime  sevelamer carbonate 800 milliGRAM(s) Oral three times a day with meals  tamsulosin 0.4 milliGRAM(s) Oral at bedtime    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 and cefepime post dialysis s.p wound Vac, chronic anemia, HFmrEF, presented to the hospital for chest pain     1. Chest pain. hx of CAD s/p PCI/CABG and PAD s/p angioplasty  ACS/CAD presenting with Chest pain  - Admit to medicine  - Cont aspirin and plavix   - Cont statin 40mg HS   - Cont Nifedipine 30mg po daily   - Troponin stable between 0.7-0.8  NST: FIXED PERFUSION DEFECT INVOLVING ANTERIOR WALL OF THE LEFT VENTRICLE EXTENDING TO APEX CONSISTENT WITH SCAR. GLOBAL HYPOKINESIS WITH POOR THICKENING OF THE ANTERIOR WALL. EF  36 %       2. NSVT  -10 beats of NSVT observed on tele on 11/16 PM  -Started Metoprolol 12.5 BID  -Maintain K>4 and Mg>2    3. Anemia of chronic disease s/p multiple PRBC  - Monitor CBC, transfuse to keep >8    4.  ESRD on HD   - HD per nephrology  - B/L pitting edema, little urine output    5. Recent hx Left calcaneal OM   - Wound Cx 9/16 - Enterobacter cloacae  - wound CX 10/16 VRE Faecium Enterobacter cloacae complex, Proteus vulgaris, Morganella morganii   - last wound cx 11/29/2022:E.coli and proteus mirabilis   - s/p debridement 10/21 Wound Cx Proteus mirabilis, VRE faecium, Pseudomonas putida  - s/p debridement 10/24 Crumble L calcaneous   - s/p debridement 11/3 with wound vac in place  - Cefepime 2g post HD through 11/30; s/p 3 session of daptomycin post-HD  - On last admission, ID plan for 4 weeks from last debridement (11/3-11/30)  -Debridement done on 11/29/2022.   - ID consult:  a) Need 4 weeks of abs from last debridement. Dc on gentamicin 160mg post HD*1 followed by maintenance 80mg post hd for 4 weeks    - Podiatry consult appreciated     6.  PAD  - s/p angiogram LLE with peroneal artery angioplasty and arterectomy 10/27  - Cont meds as above    7. Type 2 DM  - Adjusted insulin due to hypoglycemia in am  - Lantus dose was decreased     8.  BPH  - c/w Tamsulosin 0.4 mg daily    9. COVID 19 asymptomatic   - Finished a 3 day course of remdisivir     Pending: Podiatry procedure  -DVT prophylaxis: Heparin  -GI prophylaxis: Not indicated  -Diet: Renal, 1.2l fluid restriction  -Code status: Full code      patient can be dc once bed is available at [facility]       Case Discussed with House Staff   20 minutes spent on total encounter; more than 50% of the visit was spent counseling and/or coordinating care by the attending physician.   Spectra [phone]     SCHWABACHER, LAWRENCE  62y  [facility] F4-4B 018 B      Patient is a 62y old  Male who presents with a chief complaint of Chest pain (30 Nov 2022 13:29)      INTERVAL HPI/OVERNIGHT EVENTS:  Patient feels well. no overnight events       REVIEW OF SYSTEMS:        FAMILY HISTORY:  Family history of heart disease (Father)    DM (diabetes mellitus) (Mother)    ESRD (end stage renal disease) on dialysis (Mother)      T(C): 36.2 (11-30-22 @ 15:35), Max: 36.4 (11-29-22 @ 19:19)  HR: 88 (11-30-22 @ 15:35) (71 - 98)  BP: 133/60 (11-30-22 @ 15:35) (123/64 - 163/83)  RR: 20 (11-30-22 @ 15:35) (16 - 22)  SpO2: 95% (11-29-22 @ 20:00) (95% - 100%)  Wt(kg): --Vital Signs Last 24 Hrs  T(C): 36.2 (30 Nov 2022 15:35), Max: 36.4 (29 Nov 2022 19:19)  T(F): 97.2 (30 Nov 2022 15:35), Max: 97.5 (29 Nov 2022 19:19)  HR: 88 (30 Nov 2022 15:35) (71 - 98)  BP: 133/60 (30 Nov 2022 15:35) (123/64 - 163/83)  BP(mean): 104 (29 Nov 2022 17:20) (104 - 104)  RR: 20 (30 Nov 2022 15:35) (16 - 22)  SpO2: 95% (29 Nov 2022 20:00) (95% - 100%)    Parameters below as of 30 Nov 2022 11:15  Patient On (Oxygen Delivery Method): room air        PHYSICAL EXAM:  GENERAL: NAD, well-groomed, well-developed  NERVOUS SYSTEM:  Alert & Oriented X3, Good concentration;  PULM: Clear to auscultation bilaterally  CARDIAC: Regular rate and rhythm  GI: Soft, Nontender, Nondistended; Bowel sounds present  EXTREMITIES:  left foot wrapper     Consultant(s) Notes Reviewed:  [x ] YES  [ ] NO  Care Discussed with Consultants/Other Providers [ x] YES  [ ] NO    LABS:                            7.3    4.70  )-----------( 180      ( 30 Nov 2022 09:36 )             21.4   11-30    137  |  98  |  43<H>  ----------------------------<  154<H>  4.4   |  26  |  4.6<HH>    Ca    8.0<L>      30 Nov 2022 09:36  Mg     1.9     11-30    TPro  6.9  /  Alb  3.4<L>  /  TBili  0.2  /  DBili  <0.2  /  AST  6   /  ALT  <5  /  AlkPhos  141<H>  11-30            acetaminophen     Tablet .. 650 milliGRAM(s) Oral every 6 hours PRN  aspirin enteric coated 81 milliGRAM(s) Oral daily  atorvastatin 40 milliGRAM(s) Oral at bedtime  cefepime   IVPB 2000 milliGRAM(s) IV Intermittent <User Schedule>  clopidogrel Tablet 75 milliGRAM(s) Oral daily  cyanocobalamin 1000 MICROGram(s) Oral daily  darbepoetin Injectable Syringe 60 MICROGram(s) IV Push <User Schedule>  dextrose 5%. 1000 milliLiter(s) IV Continuous <Continuous>  dextrose 5%. 1000 milliLiter(s) IV Continuous <Continuous>  dextrose 50% Injectable 25 Gram(s) IV Push once  dextrose 50% Injectable 12.5 Gram(s) IV Push once  dextrose 50% Injectable 25 Gram(s) IV Push once  dextrose Oral Gel 15 Gram(s) Oral once PRN  famotidine    Tablet 20 milliGRAM(s) Oral daily  folic acid 1 milliGRAM(s) Oral daily  furosemide    Tablet 40 milliGRAM(s) Oral daily  glucagon  Injectable 1 milliGRAM(s) IntraMuscular once  guaifenesin/dextromethorphan Oral Liquid 5 milliLiter(s) Oral three times a day  heparin   Injectable 5000 Unit(s) SubCutaneous every 8 hours  influenza   Vaccine 0.5 milliLiter(s) IntraMuscular once  insulin glargine Injectable (LANTUS) 7 Unit(s) SubCutaneous at bedtime  insulin glargine Injectable (LANTUS) 13 Unit(s) SubCutaneous at bedtime  insulin lispro (ADMELOG) corrective regimen sliding scale   SubCutaneous three times a day before meals  insulin lispro Injectable (ADMELOG) 5 Unit(s) SubCutaneous before breakfast  insulin lispro Injectable (ADMELOG) 5 Unit(s) SubCutaneous before lunch  melatonin 3 milliGRAM(s) Oral at bedtime PRN  metoprolol tartrate 12.5 milliGRAM(s) Oral every 12 hours  NIFEdipine XL 30 milliGRAM(s) Oral daily  regadenoson Injectable 0.4 milliGRAM(s) IV Push once  remdesivir  IVPB 100 milliGRAM(s) IV Intermittent every 24 hours  remdesivir  IVPB   IV Intermittent   senna 2 Tablet(s) Oral at bedtime  sevelamer carbonate 800 milliGRAM(s) Oral three times a day with meals  tamsulosin 0.4 milliGRAM(s) Oral at bedtime    63 yo male, h/o type 2 DM on insulin, CAD s.p stent 2008, s/p CABG 2012 (Dr Smith), PAD s/p angioplasty and stent b/l LE, BPH, ESRD on HD through left AVF (MWF follows with Dr MARY Paula), DFU sp left toe amputation, left calcaneal OM on daptomycin and cefepime post dialysis s.p wound Vac, chronic anemia, HFmrEF, presented to the hospital for chest pain     1. Chest pain. hx of CAD s/p PCI/CABG and PAD s/p angioplasty  ACS/CAD presenting with Chest pain  - Admit to medicine  - Cont aspirin and plavix   - Cont statin 40mg HS   - Cont Nifedipine 30mg po daily   - Troponin stable between 0.7-0.8  NST: FIXED PERFUSION DEFECT INVOLVING ANTERIOR WALL OF THE LEFT VENTRICLE EXTENDING TO APEX CONSISTENT WITH SCAR. GLOBAL HYPOKINESIS WITH POOR THICKENING OF THE ANTERIOR WALL. EF  36 %       2. NSVT  -10 beats of NSVT observed on tele on 11/16 PM  - Cont Metoprolol 12.5 BID  -Maintain K>4 and Mg>2    3. Anemia of chronic disease s/p multiple PRBC  - Monitor CBC, transfuse to keep >8    4.  ESRD on HD   - HD per nephrology  - B/L pitting edema, little urine output    5. Recent hx Left calcaneal OM   - Wound Cx 9/16 - Enterobacter cloacae  - wound CX 10/16 VRE Faecium Enterobacter cloacae complex, Proteus vulgaris, Morganella morganii   - last wound cx 11/29/2022:E.coli and proteus mirabilis   - s/p debridement 10/21 Wound Cx Proteus mirabilis, VRE faecium, Pseudomonas putida  - s/p debridement 10/24 Crumble L calcaneous   - s/p debridement 11/3 with wound vac in place  - Cefepime 2g post HD through 11/30; s/p 3 session of daptomycin post-HD  - On last admission, ID plan for 4 weeks from last debridement (11/3-11/30)  -Debridement done on 11/29/2022.   - ID consult:  a) Need 4 weeks of abs from last debridement. Dc on gentamicin 160mg post HD*1 followed by maintenance 80mg post hd for 4 weeks    - Podiatry consult appreciated     6.  PAD  - s/p angiogram LLE with peroneal artery angioplasty and arterectomy 10/27  - Cont meds as above    7. Type 2 DM  - Adjusted insulin due to hypoglycemia in am  - Lantus dose was decreased     8.  BPH  - c/w Tamsulosin 0.4 mg daily    9. COVID 19 asymptomatic   - Finished a 3 day course of remdisivir     Pending: Podiatry procedure  -DVT prophylaxis: Heparin  -GI prophylaxis: Not indicated  -Diet: Renal, 1.2l fluid restriction  -Code status: Full code      patient can be dc once bed is available at [facility]       Case Discussed with House Staff   16 minutes spent on total encounter; more than 50% of the visit was spent counseling and/or coordinating care by the attending physician.   Spectra [phone]

## 2022-12-03 NOTE — PROGRESS NOTE ADULT - ASSESSMENT
63 yo male, h/o type 2 DM on insulin, CAD s.p stent 2008, s/p CABG 2012 (Dr Smith), PAD s/p angioplasty and stent b/l LE, BPH, ESRD on HD through left AVF (MWF follows with Dr MARY Paula), DFU sp left toe amputation, left calcaneal OM on daptomycin and cefepime post dialysis s.p wound Vac, chronic anemia, HFmrEF, presented to the hospital for chest pain.     #ACS/CAD patient presenting with Chest pain-resolved   #CAD s/p PCI/CABG  #PAD s/p angioplasty  #HTN/HLD  #Chronic HFmrEF  - Troponin 0.70 in setting of ESRD, 0.25 back in 09/2022  - Troponin stable between 0.7-0.8  - TTE LVEF 45-50%, Grade 3 diastolic dysfunction  - Nuclear stress test: FIXED PERFUSION DEFECT INVOLVING ANTERIOR WALL OF THE LEFT VENTRICLE EXTENDING TO APEX CONSISTENT WITH SCAR. GLOBAL HYPOKINESIS WITH POOR THICKENING OF THE ANTERIOR WALL. EF  36 %   - c/w ASA 81 mg daily  - c/w Plavix 75 mg daily  - c/w atorvastatin 40 mg daily  - c/w Nifedipine 30 mg daily  - EKG: Left anterior fascicular block ST & T wave abnormality, consider lateral ischemia. Prolonged QT    #Recent hx Left calcaneal OM   - Wound Cx 9/16 - Enterobacter cloacae  - wound CX 10/16 VRE Faecium Enterobacter cloacae complex, Proteus vulgaris, Morganella morganii   - s/p debridement 10/21 Wound Cx Proteus mirabilis, VRE faecium, Pseudomonas putida  - s/p debridement 10/24 Crumble L calcaneous   - s/p debridement 11/3 with wound vac in place  - Cefepime 2g post HD through 11/30; s/p 3 session of daptomycin post-HD  - On last admission, ID plan for 4 weeks from last debridement (11/3-11/30). Antibiotic course completed. ID was contacted again today 12/02/22 regarding the need of antibiotics on discharge. ID recommends to wait for sensitivity reports for proteus bacteria and ESR/CRP  - Debridement cancelled again-rescheduled for 11/29/30---performed. No further podiatry recommendations needed   -ID recommendations 12/03/22>>> as ESBL growing, can switch to Gentamicin 160 mg x 1 for load, followed by 80 mg (1 mg/kg Adjusted BW) post HD. plan at least  4 weeks from debridement given evidence of calcaneal infection in OR. will need Gentamicin level drawn pre HD to ensure levels < 2. Weekly CBC, CMP, ESR/CRP. ID follow-up with Dr. Gomez Mathur for Telehealth. We will call the patient between 10:30-1:30 [address] [phone]     #Covid+  -pt tested + for covid on 11/26   -started on remdesavir  -fu chest xray  -isolation protocol    #NSVT  -11/16 PM 10 beats of NSVT observed on tele   -Started Metoprolol 12.5 BID  -Maintain K>4 and Mg>2    # Anemia of chronic disease  - hb on admission 6.6 baseline 7.6. s/p 1U PRBC  -11/21 pt got 1 unit pRBCs   - Monitor CBC, transfuse to keep >8  -maintain active T&S     # ESRD on HD   - HD per nephrology  - B/L pitting edema, little urine output       # PAD  - s/p angiogram LLE with peroneal artery angioplasty and arterectomy 10/27  - Cont meds as above    # Type 2 DM  -Continue insulin regimen    # BPH  - c/w Tamsulosin 0.4 mg daily      -DVT prophylaxis: Heparin  -GI prophylaxis: Not indicated  -Diet: Renal, 1.2l fluid restriction  -Code status: Full code      #Pending>>> discharge

## 2022-12-03 NOTE — PROGRESS NOTE ADULT - SUBJECTIVE AND OBJECTIVE BOX
Podiatry Progress Note    Subjective:  SCHWABACHER, LAWRENCE is a  62y Male.   Seen bedside.   Patient is a 62y old  Male who presents with a chief complaint of Chest pain (03 Dec 2022 14:20)      Past Medical History and Surgical History  PAST MEDICAL & SURGICAL HISTORY:  CAD (coronary artery disease)  1 stent 2008      S/P CABG (coronary artery bypass graft)  x4      Diabetes mellitus      Transient ischemic attack (TIA)  2017; 2008      Chronic kidney disease (CKD)  Stage IV      Hypertension      Stented coronary artery  in 2008      Myocardial infarction  2012      PAD (peripheral artery disease)  S/p bypass left leg      HLD (hyperlipidemia)      BPH (benign prostatic hyperplasia)      Pain in left knee  s/p fall      OA (osteoarthritis)      [place] (hard of hearing)      Chronic anemia      S/P CABG (coronary artery bypass graft)  2012      H/O arterial bypass of lower limb  Left Lower Extremity (2016)      History of surgery  Left CEA (2017)  Left Pinkie toe Amputation (2014)  CABG x 4 (2012)  Card cath - stent (2008)        AV fistula  2019  LEFT AV FISTULA           Objective:  Vital Signs Last 24 Hrs  T(C): 35.7 (03 Dec 2022 07:44), Max: 36.1 (02 Dec 2022 23:58)  T(F): 96.3 (03 Dec 2022 07:44), Max: 97 (02 Dec 2022 23:58)  HR: 88 (03 Dec 2022 07:44) (81 - 88)  BP: 145/68 (03 Dec 2022 07:44) (145/68 - 153/73)  BP(mean): --  RR: 18 (03 Dec 2022 07:44) (18 - 18)  SpO2: 96% (03 Dec 2022 07:44) (96% - 96%)    Parameters below as of 03 Dec 2022 07:44  Patient On (Oxygen Delivery Method): room air                            7.9    6.19  )-----------( 210      ( 03 Dec 2022 08:09 )             24.6                 12-03    134<L>  |  95<L>  |  42<H>  ----------------------------<  82  4.4   |  27  |  4.5<HH>    Ca    7.9<L>      03 Dec 2022 08:09  Mg     1.9     12-03    TPro  6.9  /  Alb  3.4<L>  /  TBili  0.2  /  DBili  <0.2  /  AST  5   /  ALT  <5  /  AlkPhos  142<H>  12-03      Physical Exam - Left Lower Extremity Focused:   Derm: Open Left Plantar Ulcer @ Plantar Medial Rearfoot;    -Wound Base Fibrogranular; 90% granular, active moderate bleeding with some strikethrough noted to dressings    -Wound Margins Irregular; mild macerated borders    - No malodor, no purulence  Noted mild decrease in periwound erythema  Vascular: DP and PT Pulses Diminished; Foot is Warm to Warm to the touch; Capillary Refill Time < 3 Seconds;    Neuro: Protective Sensation Diminished   MSK: No Pain On Palpation at Wound Site     Assessment:  - S/P Excisional Debridement of Soft Tissue & Bone, Left Foot; (DOS: 10/21/22)   - S/P exc dbx st/b Left foot 10/24/22  - s/p exc dbx st/b left foot 11/29/22; stable        Plan:  Chart reviewed and Patient evaluated. All Questions and Concerns Addressed and Answered  Local Wound Care; Wound Flushed w/ NS; Wound Packed w/Xochilt/DSD/ABD/Kerlix/ACE  Weight Bearing Status; non weight bearing Left foot  Continue w/ Local Wound Care; Q24 Dressing Changes; Xochilt   No Further Sx Debridement;   Patient is stable from podiatry standpoint; may follow up as outpatient with Dr. Zarate   Discussed Plan w/ Attending; Dr. Zarate    Podiatry

## 2022-12-03 NOTE — PROGRESS NOTE ADULT - ASSESSMENT
· Assessment	  63 yo male, h/o type 2 DM on insulin, CAD s.p stent 2008, s/p CABG 2012 (Dr Smith), PAD s/p angioplasty and stent b/l LE, BPH, ESRD on HD through left AVF (MWF follows with Dr MARY Paula), DFU sp left toe amputation, left calcaneal OM on daptomycin and cefepime post dialysis s.p wound Vac, chronic anemia, HFmrEF, presented to the hospital for chest pain. Found to have anemia s/p 1 unit of prbc.  ESRD on HD/ Anemia / Chest pain/ OM on abx  HD on monday   RAMSEY weekly post HD 60 mcg/ transfuse if Hb< 7  on sevelamer 800 mg po tid/ phos at goal /   on lasix non oliguric    BP controlled  s/p debridement with podiatry 11/21 and 11/29 has calcaneal OM - as per ID : Gentamicin 160 mg x 1 for load, followed by 80 mg (1 mg/kg Adjusted BW) post HD / plan at least  4 weeks from debridement given evidence of calcaneal infection in OR / will need Gentamicin level drawn pre HD to ensure levels < 2   will follow

## 2022-12-03 NOTE — PROGRESS NOTE ADULT - SUBJECTIVE AND OBJECTIVE BOX
seen and examined  no distress   24 h events noted         PAST HISTORY  --------------------------------------------------------------------------------  No significant changes to PMH, PSH, FHx, SHx, unless otherwise noted    ALLERGIES & MEDICATIONS  --------------------------------------------------------------------------------  Allergies    No Known Allergies    Intolerances      Standing Inpatient Medications  aspirin enteric coated 81 milliGRAM(s) Oral daily  atorvastatin 40 milliGRAM(s) Oral at bedtime  cefepime   IVPB 2000 milliGRAM(s) IV Intermittent <User Schedule>  clopidogrel Tablet 75 milliGRAM(s) Oral daily  cyanocobalamin 1000 MICROGram(s) Oral daily  darbepoetin Injectable Syringe 60 MICROGram(s) IV Push <User Schedule>  dextrose 5%. 1000 milliLiter(s) IV Continuous <Continuous>  dextrose 5%. 1000 milliLiter(s) IV Continuous <Continuous>  dextrose 50% Injectable 25 Gram(s) IV Push once  dextrose 50% Injectable 12.5 Gram(s) IV Push once  dextrose 50% Injectable 25 Gram(s) IV Push once  famotidine    Tablet 20 milliGRAM(s) Oral daily  folic acid 1 milliGRAM(s) Oral daily  furosemide    Tablet 40 milliGRAM(s) Oral daily  glucagon  Injectable 1 milliGRAM(s) IntraMuscular once  guaifenesin/dextromethorphan Oral Liquid 5 milliLiter(s) Oral three times a day  heparin   Injectable 5000 Unit(s) SubCutaneous every 8 hours  influenza   Vaccine 0.5 milliLiter(s) IntraMuscular once  insulin glargine Injectable (LANTUS) 10 Unit(s) SubCutaneous at bedtime  insulin lispro (ADMELOG) corrective regimen sliding scale   SubCutaneous three times a day before meals  insulin lispro Injectable (ADMELOG) 5 Unit(s) SubCutaneous before breakfast  insulin lispro Injectable (ADMELOG) 5 Unit(s) SubCutaneous before lunch  metoprolol tartrate 12.5 milliGRAM(s) Oral every 12 hours  NIFEdipine XL 30 milliGRAM(s) Oral daily  regadenoson Injectable 0.4 milliGRAM(s) IV Push once  senna 2 Tablet(s) Oral at bedtime  sevelamer carbonate 800 milliGRAM(s) Oral three times a day with meals  tamsulosin 0.4 milliGRAM(s) Oral at bedtime    PRN Inpatient Medications  acetaminophen     Tablet .. 650 milliGRAM(s) Oral every 6 hours PRN  dextrose Oral Gel 15 Gram(s) Oral once PRN  melatonin 3 milliGRAM(s) Oral at bedtime PRN        VITALS/PHYSICAL EXAM  --------------------------------------------------------------------------------  T(C): 36.1 (12-02-22 @ 23:58), Max: 37 (12-02-22 @ 08:00)  HR: 82 (12-03-22 @ 05:00) (80 - 85)  BP: 152/70 (12-03-22 @ 05:00) (131/68 - 153/73)  RR: 18 (12-02-22 @ 23:58) (16 - 18)  SpO2: --  Wt(kg): --        12-01-22 @ 07:01  -  12-02-22 @ 07:00  --------------------------------------------------------  IN: 750 mL / OUT: 0 mL / NET: 750 mL    12-02-22 @ 07:01  -  12-03-22 @ 06:55  --------------------------------------------------------  IN: 1405 mL / OUT: 3910 mL / NET: -2505 mL      Physical Exam:  	Gen: NAD  	Pulm: CTA B/L  	CV:  S1S2; no rub  	Abd:soft, nontender/nondistended  	LE: dressing   	Vascular access:av     LABS/STUDIES  --------------------------------------------------------------------------------              8.4    6.02  >-----------<  221      [12-02-22 @ 12:43]              25.0     141  |  99  |  31  ----------------------------<  100      [12-02-22 @ 12:43]  3.8   |  29  |  3.4        Ca     8.3     [12-02-22 @ 12:43]      Mg     1.9     [12-02-22 @ 12:43]    TPro  7.5  /  Alb  3.7  /  TBili  0.3  /  DBili  x   /  AST  7   /  ALT  <5  /  AlkPhos  173  [12-02-22 @ 12:43]    PT/INR: PT 14.70, INR 1.28       [12-02-22 @ 07:34]      Creatinine Trend:  SCr 3.4 [12-02 @ 12:43]  SCr 5.5 [12-02 @ 07:34]  SCr 4.6 [12-01 @ 07:42]  SCr 4.6 [11-30 @ 09:36]  SCr 5.0 [11-29 @ 06:56]        Iron 39, TIBC 133, %sat 29      [10-21-22 @ 10:30]  Ferritin 1126      [10-21-22 @ 10:30]  PTH -- (Ca 7.5)      [02-26-22 @ 16:00]   312  Vitamin D (25OH) 21      [02-26-22 @ 16:00]  HbA1c 5.8      [01-30-20 @ 06:46]  Lipid: chol 81, , HDL 22, LDL --      [10-15-22 @ 09:53]

## 2022-12-04 LAB
ALBUMIN SERPL ELPH-MCNC: 3.6 G/DL — SIGNIFICANT CHANGE UP (ref 3.5–5.2)
ALBUMIN SERPL ELPH-MCNC: 3.8 G/DL — SIGNIFICANT CHANGE UP (ref 3.5–5.2)
ALP SERPL-CCNC: 154 U/L — HIGH (ref 30–115)
ALP SERPL-CCNC: 159 U/L — HIGH (ref 30–115)
ALT FLD-CCNC: <5 U/L — SIGNIFICANT CHANGE UP (ref 0–41)
ALT FLD-CCNC: <5 U/L — SIGNIFICANT CHANGE UP (ref 0–41)
ANION GAP SERPL CALC-SCNC: 16 MMOL/L — HIGH (ref 7–14)
AST SERPL-CCNC: 5 U/L — SIGNIFICANT CHANGE UP (ref 0–41)
AST SERPL-CCNC: 7 U/L — SIGNIFICANT CHANGE UP (ref 0–41)
BASOPHILS # BLD AUTO: 0.06 K/UL — SIGNIFICANT CHANGE UP (ref 0–0.2)
BASOPHILS NFR BLD AUTO: 0.9 % — SIGNIFICANT CHANGE UP (ref 0–1)
BILIRUB DIRECT SERPL-MCNC: <0.2 MG/DL — SIGNIFICANT CHANGE UP (ref 0–0.3)
BILIRUB INDIRECT FLD-MCNC: >0 MG/DL — LOW (ref 0.2–1.2)
BILIRUB SERPL-MCNC: 0.2 MG/DL — SIGNIFICANT CHANGE UP (ref 0.2–1.2)
BILIRUB SERPL-MCNC: 0.3 MG/DL — SIGNIFICANT CHANGE UP (ref 0.2–1.2)
BLD GP AB SCN SERPL QL: SIGNIFICANT CHANGE UP
BUN SERPL-MCNC: 56 MG/DL — HIGH (ref 10–20)
CALCIUM SERPL-MCNC: 8.2 MG/DL — LOW (ref 8.4–10.5)
CHLORIDE SERPL-SCNC: 95 MMOL/L — LOW (ref 98–110)
CO2 SERPL-SCNC: 24 MMOL/L — SIGNIFICANT CHANGE UP (ref 17–32)
CREAT SERPL-MCNC: 5.7 MG/DL — CRITICAL HIGH (ref 0.7–1.5)
EGFR: 10 ML/MIN/1.73M2 — LOW
EOSINOPHIL # BLD AUTO: 0.21 K/UL — SIGNIFICANT CHANGE UP (ref 0–0.7)
EOSINOPHIL NFR BLD AUTO: 3.3 % — SIGNIFICANT CHANGE UP (ref 0–8)
GLUCOSE BLDC GLUCOMTR-MCNC: 106 MG/DL — HIGH (ref 70–99)
GLUCOSE BLDC GLUCOMTR-MCNC: 165 MG/DL — HIGH (ref 70–99)
GLUCOSE BLDC GLUCOMTR-MCNC: 167 MG/DL — HIGH (ref 70–99)
GLUCOSE BLDC GLUCOMTR-MCNC: 80 MG/DL — SIGNIFICANT CHANGE UP (ref 70–99)
GLUCOSE SERPL-MCNC: 100 MG/DL — HIGH (ref 70–99)
HCT VFR BLD CALC: 25.2 % — LOW (ref 42–52)
HGB BLD-MCNC: 8.3 G/DL — LOW (ref 14–18)
IMM GRANULOCYTES NFR BLD AUTO: 0.3 % — SIGNIFICANT CHANGE UP (ref 0.1–0.3)
INR BLD: 1.19 RATIO — SIGNIFICANT CHANGE UP (ref 0.65–1.3)
LYMPHOCYTES # BLD AUTO: 1.07 K/UL — LOW (ref 1.2–3.4)
LYMPHOCYTES # BLD AUTO: 16.7 % — LOW (ref 20.5–51.1)
MAGNESIUM SERPL-MCNC: 1.9 MG/DL — SIGNIFICANT CHANGE UP (ref 1.8–2.4)
MCHC RBC-ENTMCNC: 29.2 PG — SIGNIFICANT CHANGE UP (ref 27–31)
MCHC RBC-ENTMCNC: 32.9 G/DL — SIGNIFICANT CHANGE UP (ref 32–37)
MCV RBC AUTO: 88.7 FL — SIGNIFICANT CHANGE UP (ref 80–94)
MONOCYTES # BLD AUTO: 0.42 K/UL — SIGNIFICANT CHANGE UP (ref 0.1–0.6)
MONOCYTES NFR BLD AUTO: 6.6 % — SIGNIFICANT CHANGE UP (ref 1.7–9.3)
NEUTROPHILS # BLD AUTO: 4.62 K/UL — SIGNIFICANT CHANGE UP (ref 1.4–6.5)
NEUTROPHILS NFR BLD AUTO: 72.2 % — SIGNIFICANT CHANGE UP (ref 42.2–75.2)
NRBC # BLD: 0 /100 WBCS — SIGNIFICANT CHANGE UP (ref 0–0)
PLATELET # BLD AUTO: 222 K/UL — SIGNIFICANT CHANGE UP (ref 130–400)
POTASSIUM SERPL-MCNC: 4.7 MMOL/L — SIGNIFICANT CHANGE UP (ref 3.5–5)
POTASSIUM SERPL-SCNC: 4.7 MMOL/L — SIGNIFICANT CHANGE UP (ref 3.5–5)
PROT SERPL-MCNC: 7.3 G/DL — SIGNIFICANT CHANGE UP (ref 6–8)
PROT SERPL-MCNC: 7.6 G/DL — SIGNIFICANT CHANGE UP (ref 6–8)
PROTHROM AB SERPL-ACNC: 13.6 SEC — HIGH (ref 9.95–12.87)
RBC # BLD: 2.84 M/UL — LOW (ref 4.7–6.1)
RBC # FLD: 15.8 % — HIGH (ref 11.5–14.5)
SODIUM SERPL-SCNC: 135 MMOL/L — SIGNIFICANT CHANGE UP (ref 135–146)
WBC # BLD: 6.4 K/UL — SIGNIFICANT CHANGE UP (ref 4.8–10.8)
WBC # FLD AUTO: 6.4 K/UL — SIGNIFICANT CHANGE UP (ref 4.8–10.8)

## 2022-12-04 PROCEDURE — 99232 SBSQ HOSP IP/OBS MODERATE 35: CPT

## 2022-12-04 RX ADMIN — Medication 81 MILLIGRAM(S): at 12:35

## 2022-12-04 RX ADMIN — HEPARIN SODIUM 5000 UNIT(S): 5000 INJECTION INTRAVENOUS; SUBCUTANEOUS at 21:35

## 2022-12-04 RX ADMIN — Medication 40 MILLIGRAM(S): at 05:53

## 2022-12-04 RX ADMIN — Medication 30 MILLIGRAM(S): at 05:53

## 2022-12-04 RX ADMIN — TAMSULOSIN HYDROCHLORIDE 0.4 MILLIGRAM(S): 0.4 CAPSULE ORAL at 21:35

## 2022-12-04 RX ADMIN — SENNA PLUS 2 TABLET(S): 8.6 TABLET ORAL at 21:35

## 2022-12-04 RX ADMIN — Medication 1 MILLIGRAM(S): at 12:27

## 2022-12-04 RX ADMIN — Medication 5 MILLILITER(S): at 05:53

## 2022-12-04 RX ADMIN — HEPARIN SODIUM 5000 UNIT(S): 5000 INJECTION INTRAVENOUS; SUBCUTANEOUS at 05:53

## 2022-12-04 RX ADMIN — Medication 1: at 16:48

## 2022-12-04 RX ADMIN — SEVELAMER CARBONATE 800 MILLIGRAM(S): 2400 POWDER, FOR SUSPENSION ORAL at 12:27

## 2022-12-04 RX ADMIN — HEPARIN SODIUM 5000 UNIT(S): 5000 INJECTION INTRAVENOUS; SUBCUTANEOUS at 13:48

## 2022-12-04 RX ADMIN — SEVELAMER CARBONATE 800 MILLIGRAM(S): 2400 POWDER, FOR SUSPENSION ORAL at 08:37

## 2022-12-04 RX ADMIN — SEVELAMER CARBONATE 800 MILLIGRAM(S): 2400 POWDER, FOR SUSPENSION ORAL at 16:49

## 2022-12-04 RX ADMIN — CLOPIDOGREL BISULFATE 75 MILLIGRAM(S): 75 TABLET, FILM COATED ORAL at 12:26

## 2022-12-04 RX ADMIN — Medication 12.5 MILLIGRAM(S): at 17:14

## 2022-12-04 RX ADMIN — Medication 12.5 MILLIGRAM(S): at 05:53

## 2022-12-04 RX ADMIN — Medication 5 MILLILITER(S): at 21:34

## 2022-12-04 RX ADMIN — PREGABALIN 1000 MICROGRAM(S): 225 CAPSULE ORAL at 12:36

## 2022-12-04 RX ADMIN — INSULIN GLARGINE 10 UNIT(S): 100 INJECTION, SOLUTION SUBCUTANEOUS at 21:34

## 2022-12-04 RX ADMIN — FAMOTIDINE 20 MILLIGRAM(S): 10 INJECTION INTRAVENOUS at 12:27

## 2022-12-04 RX ADMIN — ATORVASTATIN CALCIUM 40 MILLIGRAM(S): 80 TABLET, FILM COATED ORAL at 21:35

## 2022-12-04 RX ADMIN — Medication 5 MILLILITER(S): at 13:48

## 2022-12-04 NOTE — PROGRESS NOTE ADULT - SUBJECTIVE AND OBJECTIVE BOX
Nephrology Progress Note    SCHWABACHER, LAWRENCE  MRN-[id number]  62y  Male    S:  Patient is seen and examined, events over the last 24h noted.    O:  Allergies:  No Known Allergies    Hospital Medications:   MEDICATIONS  (STANDING):  aspirin enteric coated 81 milliGRAM(s) Oral daily  atorvastatin 40 milliGRAM(s) Oral at bedtime  cefepime   IVPB 2000 milliGRAM(s) IV Intermittent <User Schedule>  clopidogrel Tablet 75 milliGRAM(s) Oral daily  cyanocobalamin 1000 MICROGram(s) Oral daily  darbepoetin Injectable Syringe 60 MICROGram(s) IV Push <User Schedule>  dextrose 5%. 1000 milliLiter(s) (50 mL/Hr) IV Continuous <Continuous>  dextrose 5%. 1000 milliLiter(s) (100 mL/Hr) IV Continuous <Continuous>  dextrose 50% Injectable 25 Gram(s) IV Push once  dextrose 50% Injectable 12.5 Gram(s) IV Push once  dextrose 50% Injectable 25 Gram(s) IV Push once  famotidine    Tablet 20 milliGRAM(s) Oral daily  folic acid 1 milliGRAM(s) Oral daily  furosemide    Tablet 40 milliGRAM(s) Oral daily  glucagon  Injectable 1 milliGRAM(s) IntraMuscular once  guaifenesin/dextromethorphan Oral Liquid 5 milliLiter(s) Oral three times a day  heparin   Injectable 5000 Unit(s) SubCutaneous every 8 hours  influenza   Vaccine 0.5 milliLiter(s) IntraMuscular once  insulin glargine Injectable (LANTUS) 10 Unit(s) SubCutaneous at bedtime  insulin lispro (ADMELOG) corrective regimen sliding scale   SubCutaneous three times a day before meals  insulin lispro Injectable (ADMELOG) 5 Unit(s) SubCutaneous before lunch  insulin lispro Injectable (ADMELOG) 5 Unit(s) SubCutaneous before breakfast  metoprolol tartrate 12.5 milliGRAM(s) Oral every 12 hours  NIFEdipine XL 30 milliGRAM(s) Oral daily  regadenoson Injectable 0.4 milliGRAM(s) IV Push once  senna 2 Tablet(s) Oral at bedtime  sevelamer carbonate 800 milliGRAM(s) Oral three times a day with meals  tamsulosin 0.4 milliGRAM(s) Oral at bedtime    MEDICATIONS  (PRN):  acetaminophen     Tablet .. 650 milliGRAM(s) Oral every 6 hours PRN Mild Pain (1 - 3)  artificial  tears Solution 3 Drop(s) Both EYES every 2 hours PRN Dry Eyes  dextrose Oral Gel 15 Gram(s) Oral once PRN Blood Glucose LESS THAN 70 milliGRAM(s)/deciliter  melatonin 3 milliGRAM(s) Oral at bedtime PRN Insomnia    Home Medications:  aspirin 81 mg oral tablet: 1 tab(s) orally once a day (14 Sep 2022 17:42)  furosemide 40 mg oral tablet: 1 tab(s) orally once a day (2022 07:59)  guaifenesin-dextromethorphan 100 mg-10 mg/5 mL oral liquid: 5 milliliter(s) orally 3 times a day (01 Dec 2022 12:01)  Levemir 100 units/mL subcutaneous solution: 13 unit(s) subcutaneous once a day (at bedtime) (01 Dec 2022 12:01)  NIFEdipine 30 mg oral tablet, extended release: 1 tab(s) orally once a day (2022 13:45)  nitroglycerin 0.4 mg sublingual spray: 1 tab(s) sublingual every 5 minutes, As Needed up to 3 tabs (2022 08:05)  Plavix 75 mg oral tablet: 1 tab(s) orally once a day (14 Sep 2022 17:42)  Senna 8.6 mg oral tablet: 2 tab(s) orally once a day (at bedtime) (2022 08:03)  sevelamer carbonate 800 mg oral tablet: 1 tab(s) orally 3 times a day (with meals) (20 Sep 2022 11:36)  tamsulosin 0.4 mg oral capsule: 1 cap(s) orally once a day (at bedtime) (2022 13:45)  Trulicity Pen 1.5 mg/0.5 mL subcutaneous solution: 1.5  subcutaneous once a week (14 Sep 2022 17:42)  Tylenol 325 mg oral tablet: 2 tab(s) orally every 6 hours, As Needed (2022 08:03)      VITALS:  Daily     Daily Weight in k.3 (04 Dec 2022 05:21)  T(F): 98 (22 @ 16:00), Max: 98 (22 @ 16:00)  HR: 83 (22 @ 16:00)  BP: 157/59 (22 @ 16:00)  RR: 18 (22 @ 16:00)  SpO2: 100% (22 @ 16:00)  Wt(kg): --  I&O's Detail    03 Dec 2022 07:01  -  04 Dec 2022 07:00  --------------------------------------------------------  IN:    Oral Fluid: 360 mL  Total IN: 360 mL    OUT:  Total OUT: 0 mL    Total NET: 360 mL        I&O's Summary    03 Dec 2022 07:01  -  04 Dec 2022 07:00  --------------------------------------------------------  IN: 360 mL / OUT: 0 mL / NET: 360 mL          PHYSICAL EXAM:  Gen: NAD  Resp: CTAB  Card: S1/S2  Abd: soft  Vascular access: AVF      LABS:    Blood Gas Profile w/Lytes - Venous: Performed in Lab (22 @ 23:49)  Blood Gas Venous - Sodium: 125 mmol/L (22 @ 23:49)  Blood Gas Venous - Potassium: 4.7 mmol/L (22 @ 23:49)        135  |  95<L>  |  56<H>  ----------------------------<  100<H>  4.7   |  24  |  5.7<HH>    Ca    8.2<L>      04 Dec 2022 09:13  Mg     1.9         TPro  7.3  /  Alb  3.6  /  TBili  0.3  /  DBili  <0.2  /  AST  5   /  ALT  <5  /  AlkPhos  154<H>      Phosphorus Level, Serum: 3.6 mg/dL (22 @ 05:57)  Phosphorus Level, Serum: 3.1 mg/dL (22 @ 07:23)                          8.3    6.40  )-----------( 222      ( 04 Dec 2022 09:13 )             25.2     Mean Cell Volume: 88.7 fL (22 @ 09:13)

## 2022-12-04 NOTE — PROGRESS NOTE ADULT - ASSESSMENT
· Assessment	  63 yo male, h/o type 2 DM on insulin, CAD s.p stent 2008, s/p CABG 2012 (Dr Smith), PAD s/p angioplasty and stent b/l LE, BPH, ESRD on HD through left AVF (MWF follows with Dr MARY Paula), DFU sp left toe amputation, left calcaneal OM on daptomycin and cefepime post dialysis s.p wound Vac, chronic anemia, HFmrEF, presented to the hospital for chest pain.    ESRD on HD/ Anemia / Chest pain/ OM on abx  next HD on monday   RAMSEY weekly with 60 mcg-increase/ transfuse if Hb< 7  on sevelamer 800 mg po tid/ phos at goal   on lasix non oliguric    BP controlled  s/p debridement with podiatry 11/21 and 11/29 has calcaneal OM - as per ID : Gentamicin 160 mg x 1 for load, followed by 80 mg (1 mg/kg Adjusted BW) post HD / plan at least  4 weeks from debridement given evidence of calcaneal infection in OR / will need Gentamicin level drawn pre HD to ensure levels < 2   will follow

## 2022-12-04 NOTE — PROGRESS NOTE ADULT - SUBJECTIVE AND OBJECTIVE BOX
SCHWABACHER, LAWRENCE  62y  [facility] [id number] 018 B      Patient is a 62y old  Male who presents with a chief complaint of Chest pain (30 Nov 2022 13:29)      INTERVAL HPI/OVERNIGHT EVENTS:  Patient feels well. no overnight events       REVIEW OF SYSTEMS:        FAMILY HISTORY:  Family history of heart disease (Father)    DM (diabetes mellitus) (Mother)    ESRD (end stage renal disease) on dialysis (Mother)      T(C): 36.2 (11-30-22 @ 15:35), Max: 36.4 (11-29-22 @ 19:19)  HR: 88 (11-30-22 @ 15:35) (71 - 98)  BP: 133/60 (11-30-22 @ 15:35) (123/64 - 163/83)  RR: 20 (11-30-22 @ 15:35) (16 - 22)  SpO2: 95% (11-29-22 @ 20:00) (95% - 100%)  Wt(kg): --Vital Signs Last 24 Hrs  T(C): 36.2 (30 Nov 2022 15:35), Max: 36.4 (29 Nov 2022 19:19)  T(F): 97.2 (30 Nov 2022 15:35), Max: 97.5 (29 Nov 2022 19:19)  HR: 88 (30 Nov 2022 15:35) (71 - 98)  BP: 133/60 (30 Nov 2022 15:35) (123/64 - 163/83)  BP(mean): 104 (29 Nov 2022 17:20) (104 - 104)  RR: 20 (30 Nov 2022 15:35) (16 - 22)  SpO2: 95% (29 Nov 2022 20:00) (95% - 100%)    Parameters below as of 30 Nov 2022 11:15  Patient On (Oxygen Delivery Method): room air        PHYSICAL EXAM:  GENERAL: NAD, well-groomed, well-developed  NERVOUS SYSTEM:  Alert & Oriented X3, Good concentration;  PULM: Clear to auscultation bilaterally  CARDIAC: Regular rate and rhythm  GI: Soft, Nontender, Nondistended; Bowel sounds present  EXTREMITIES:  left foot wrapper     Consultant(s) Notes Reviewed:  [x ] YES  [ ] NO  Care Discussed with Consultants/Other Providers [ x] YES  [ ] NO    LABS:                            7.3    4.70  )-----------( 180      ( 30 Nov 2022 09:36 )             21.4   11-30    137  |  98  |  43<H>  ----------------------------<  154<H>  4.4   |  26  |  4.6<HH>    Ca    8.0<L>      30 Nov 2022 09:36  Mg     1.9     11-30    TPro  6.9  /  Alb  3.4<L>  /  TBili  0.2  /  DBili  <0.2  /  AST  6   /  ALT  <5  /  AlkPhos  141<H>  11-30            acetaminophen     Tablet .. 650 milliGRAM(s) Oral every 6 hours PRN  aspirin enteric coated 81 milliGRAM(s) Oral daily  atorvastatin 40 milliGRAM(s) Oral at bedtime  cefepime   IVPB 2000 milliGRAM(s) IV Intermittent <User Schedule>  clopidogrel Tablet 75 milliGRAM(s) Oral daily  cyanocobalamin 1000 MICROGram(s) Oral daily  darbepoetin Injectable Syringe 60 MICROGram(s) IV Push <User Schedule>  dextrose 5%. 1000 milliLiter(s) IV Continuous <Continuous>  dextrose 5%. 1000 milliLiter(s) IV Continuous <Continuous>  dextrose 50% Injectable 25 Gram(s) IV Push once  dextrose 50% Injectable 12.5 Gram(s) IV Push once  dextrose 50% Injectable 25 Gram(s) IV Push once  dextrose Oral Gel 15 Gram(s) Oral once PRN  famotidine    Tablet 20 milliGRAM(s) Oral daily  folic acid 1 milliGRAM(s) Oral daily  furosemide    Tablet 40 milliGRAM(s) Oral daily  glucagon  Injectable 1 milliGRAM(s) IntraMuscular once  guaifenesin/dextromethorphan Oral Liquid 5 milliLiter(s) Oral three times a day  heparin   Injectable 5000 Unit(s) SubCutaneous every 8 hours  influenza   Vaccine 0.5 milliLiter(s) IntraMuscular once  insulin glargine Injectable (LANTUS) 7 Unit(s) SubCutaneous at bedtime  insulin glargine Injectable (LANTUS) 13 Unit(s) SubCutaneous at bedtime  insulin lispro (ADMELOG) corrective regimen sliding scale   SubCutaneous three times a day before meals  insulin lispro Injectable (ADMELOG) 5 Unit(s) SubCutaneous before breakfast  insulin lispro Injectable (ADMELOG) 5 Unit(s) SubCutaneous before lunch  melatonin 3 milliGRAM(s) Oral at bedtime PRN  metoprolol tartrate 12.5 milliGRAM(s) Oral every 12 hours  NIFEdipine XL 30 milliGRAM(s) Oral daily  regadenoson Injectable 0.4 milliGRAM(s) IV Push once  remdesivir  IVPB 100 milliGRAM(s) IV Intermittent every 24 hours  remdesivir  IVPB   IV Intermittent   senna 2 Tablet(s) Oral at bedtime  sevelamer carbonate 800 milliGRAM(s) Oral three times a day with meals  tamsulosin 0.4 milliGRAM(s) Oral at bedtime    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 and cefepime post dialysis s.p wound Vac, chronic anemia, HFmrEF, presented to the hospital for chest pain     1. Chest pain. hx of CAD s/p PCI/CABG and PAD s/p angioplasty  ACS/CAD presenting with Chest pain  - Admit to medicine  - Cont aspirin and plavix   - Cont statin 40mg HS   - Cont Nifedipine 30mg po daily   - Troponin stable between 0.7-0.8  NST: FIXED PERFUSION DEFECT INVOLVING ANTERIOR WALL OF THE LEFT VENTRICLE EXTENDING TO APEX CONSISTENT WITH SCAR. GLOBAL HYPOKINESIS WITH POOR THICKENING OF THE ANTERIOR WALL. EF  36 %       2. NSVT  -10 beats of NSVT observed on tele on 11/16 PM  - Cont Metoprolol 12.5 BID  -Maintain K>4 and Mg>2    3. Anemia of chronic disease s/p multiple PRBC  - Monitor CBC, transfuse to keep >8    4.  ESRD on HD   - HD per nephrology  - B/L pitting edema, little urine output    5. Recent hx Left calcaneal OM   - Wound Cx 9/16 - Enterobacter cloacae  - wound CX 10/16 VRE Faecium Enterobacter cloacae complex, Proteus vulgaris, Morganella morganii   - last wound cx 11/29/2022:E.coli and proteus mirabilis   - s/p debridement 10/21 Wound Cx Proteus mirabilis, VRE faecium, Pseudomonas putida  - s/p debridement 10/24 Crumble L calcaneous   - s/p debridement 11/3 with wound vac in place  - Cefepime 2g post HD through 11/30; s/p 3 session of daptomycin post-HD  - On last admission, ID plan for 4 weeks from last debridement (11/3-11/30)  -Debridement done on 11/29/2022.   - ID consult:  a) Need 4 weeks of abs from last debridement. Dc on gentamicin 160mg post HD*1 followed by maintenance 80mg post hd for 4 weeks    - Podiatry consult appreciated     6.  PAD  - s/p angiogram LLE with peroneal artery angioplasty and arterectomy 10/27  - Cont meds as above    7. Type 2 DM  - Adjusted insulin due to hypoglycemia in am  - Lantus dose was decreased     8.  BPH  - c/w Tamsulosin 0.4 mg daily    9. COVID 19 asymptomatic   - Finished a 3 day course of remdisivir     Pending: Podiatry procedure  -DVT prophylaxis: Heparin  -GI prophylaxis: Not indicated  -Diet: Renal, 1.2l fluid restriction  -Code status: Full code      patient can be dc once bed is available at [facility]       Case Discussed with House Staff   16 minutes spent on total encounter; more than 50% of the visit was spent counseling and/or coordinating care by the attending physician.   Spectra [phone]     SCHWABACHER, LAWRENCE  62y  [facility] F4-4B 018 B      Patient is a 62y old  Male who presents with a chief complaint of Chest pain (30 Nov 2022 13:29)      INTERVAL HPI/OVERNIGHT EVENTS:  Patient feels well. no overnight events   complaining about eye itching           FAMILY HISTORY:  Family history of heart disease (Father)    DM (diabetes mellitus) (Mother)    ESRD (end stage renal disease) on dialysis (Mother)      T(C): 36.2 (11-30-22 @ 15:35), Max: 36.4 (11-29-22 @ 19:19)  HR: 88 (11-30-22 @ 15:35) (71 - 98)  BP: 133/60 (11-30-22 @ 15:35) (123/64 - 163/83)  RR: 20 (11-30-22 @ 15:35) (16 - 22)  SpO2: 95% (11-29-22 @ 20:00) (95% - 100%)  Wt(kg): --Vital Signs Last 24 Hrs  T(C): 36.2 (30 Nov 2022 15:35), Max: 36.4 (29 Nov 2022 19:19)  T(F): 97.2 (30 Nov 2022 15:35), Max: 97.5 (29 Nov 2022 19:19)  HR: 88 (30 Nov 2022 15:35) (71 - 98)  BP: 133/60 (30 Nov 2022 15:35) (123/64 - 163/83)  BP(mean): 104 (29 Nov 2022 17:20) (104 - 104)  RR: 20 (30 Nov 2022 15:35) (16 - 22)  SpO2: 95% (29 Nov 2022 20:00) (95% - 100%)    Parameters below as of 30 Nov 2022 11:15  Patient On (Oxygen Delivery Method): room air        PHYSICAL EXAM:  GENERAL: NAD, well-groomed, well-developed  NERVOUS SYSTEM:  Alert & Oriented X3, Good concentration;  PULM: Clear to auscultation bilaterally  CARDIAC: Regular rate and rhythm  GI: Soft, Nontender, Nondistended; Bowel sounds present  EXTREMITIES:  left foot wrapper     Consultant(s) Notes Reviewed:  [x ] YES  [ ] NO  Care Discussed with Consultants/Other Providers [ x] YES  [ ] NO    LABS:                            7.3    4.70  )-----------( 180      ( 30 Nov 2022 09:36 )             21.4   11-30    137  |  98  |  43<H>  ----------------------------<  154<H>  4.4   |  26  |  4.6<HH>    Ca    8.0<L>      30 Nov 2022 09:36  Mg     1.9     11-30    TPro  6.9  /  Alb  3.4<L>  /  TBili  0.2  /  DBili  <0.2  /  AST  6   /  ALT  <5  /  AlkPhos  141<H>  11-30            acetaminophen     Tablet .. 650 milliGRAM(s) Oral every 6 hours PRN  aspirin enteric coated 81 milliGRAM(s) Oral daily  atorvastatin 40 milliGRAM(s) Oral at bedtime  cefepime   IVPB 2000 milliGRAM(s) IV Intermittent <User Schedule>  clopidogrel Tablet 75 milliGRAM(s) Oral daily  cyanocobalamin 1000 MICROGram(s) Oral daily  darbepoetin Injectable Syringe 60 MICROGram(s) IV Push <User Schedule>  dextrose 5%. 1000 milliLiter(s) IV Continuous <Continuous>  dextrose 5%. 1000 milliLiter(s) IV Continuous <Continuous>  dextrose 50% Injectable 25 Gram(s) IV Push once  dextrose 50% Injectable 12.5 Gram(s) IV Push once  dextrose 50% Injectable 25 Gram(s) IV Push once  dextrose Oral Gel 15 Gram(s) Oral once PRN  famotidine    Tablet 20 milliGRAM(s) Oral daily  folic acid 1 milliGRAM(s) Oral daily  furosemide    Tablet 40 milliGRAM(s) Oral daily  glucagon  Injectable 1 milliGRAM(s) IntraMuscular once  guaifenesin/dextromethorphan Oral Liquid 5 milliLiter(s) Oral three times a day  heparin   Injectable 5000 Unit(s) SubCutaneous every 8 hours  influenza   Vaccine 0.5 milliLiter(s) IntraMuscular once  insulin glargine Injectable (LANTUS) 7 Unit(s) SubCutaneous at bedtime  insulin glargine Injectable (LANTUS) 13 Unit(s) SubCutaneous at bedtime  insulin lispro (ADMELOG) corrective regimen sliding scale   SubCutaneous three times a day before meals  insulin lispro Injectable (ADMELOG) 5 Unit(s) SubCutaneous before breakfast  insulin lispro Injectable (ADMELOG) 5 Unit(s) SubCutaneous before lunch  melatonin 3 milliGRAM(s) Oral at bedtime PRN  metoprolol tartrate 12.5 milliGRAM(s) Oral every 12 hours  NIFEdipine XL 30 milliGRAM(s) Oral daily  regadenoson Injectable 0.4 milliGRAM(s) IV Push once  remdesivir  IVPB 100 milliGRAM(s) IV Intermittent every 24 hours  remdesivir  IVPB   IV Intermittent   senna 2 Tablet(s) Oral at bedtime  sevelamer carbonate 800 milliGRAM(s) Oral three times a day with meals  tamsulosin 0.4 milliGRAM(s) Oral at bedtime    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 and cefepime post dialysis s.p wound Vac, chronic anemia, HFmrEF, presented to the hospital for chest pain     1. Chest pain. hx of CAD s/p PCI/CABG and PAD s/p angioplasty  ACS/CAD presenting with Chest pain  - Admit to medicine  - Cont aspirin and plavix   - Cont statin 40mg HS   - Cont Nifedipine 30mg po daily   - Troponin stable between 0.7-0.8  NST: FIXED PERFUSION DEFECT INVOLVING ANTERIOR WALL OF THE LEFT VENTRICLE EXTENDING TO APEX CONSISTENT WITH SCAR. GLOBAL HYPOKINESIS WITH POOR THICKENING OF THE ANTERIOR WALL. EF  36 %       2. NSVT  -10 beats of NSVT observed on tele on 11/16 PM  - Cont Metoprolol 12.5 BID  -Maintain K>4 and Mg>2    3. Anemia of chronic disease s/p multiple PRBC  - Monitor CBC, transfuse to keep >8    4.  ESRD on HD   - HD per nephrology  - B/L pitting edema, little urine output    5. Recent hx Left calcaneal OM   - Wound Cx 9/16 - Enterobacter cloacae  - wound CX 10/16 VRE Faecium Enterobacter cloacae complex, Proteus vulgaris, Morganella morganii   - last wound cx 11/29/2022:E.coli and proteus mirabilis   - s/p debridement 10/21 Wound Cx Proteus mirabilis, VRE faecium, Pseudomonas putida  - s/p debridement 10/24 Crumble L calcaneous   - s/p debridement 11/3 with wound vac in place  - Cefepime 2g post HD through 11/30; s/p 3 session of daptomycin post-HD  - On last admission, ID plan for 4 weeks from last debridement (11/3-11/30)  -Debridement done on 11/29/2022.   - ID consult:  a) Need 4 weeks of abs from last debridement. Dc on gentamicin 160mg post HD*1 followed by maintenance 80mg post hd for 4 weeks    - Podiatry consult appreciated     6.  PAD  - s/p angiogram LLE with peroneal artery angioplasty and arterectomy 10/27  - Cont meds as above    7. Type 2 DM  - Adjusted insulin due to hypoglycemia in am  - Lantus dose was decreased     8.  BPH  - c/w Tamsulosin 0.4 mg daily    9. COVID 19 asymptomatic   - Finished a 3 day course of remdisivir     10. Eye itching  - Tear drops     Pending: Podiatry procedure  -DVT prophylaxis: Heparin  -GI prophylaxis: Not indicated  -Diet: Renal, 1.2l fluid restriction  -Code status: Full code      patient can be dc once bed is available at [facility]       Case Discussed with House Staff   16 minutes spent on total encounter; more than 50% of the visit was spent counseling and/or coordinating care by the attending physician.   Spectra [phone]

## 2022-12-05 LAB
ALBUMIN SERPL ELPH-MCNC: 3.3 G/DL — LOW (ref 3.5–5.2)
ALBUMIN SERPL ELPH-MCNC: 3.5 G/DL — SIGNIFICANT CHANGE UP (ref 3.5–5.2)
ALP SERPL-CCNC: 144 U/L — HIGH (ref 30–115)
ALP SERPL-CCNC: 145 U/L — HIGH (ref 30–115)
ALT FLD-CCNC: <5 U/L — SIGNIFICANT CHANGE UP (ref 0–41)
ALT FLD-CCNC: <5 U/L — SIGNIFICANT CHANGE UP (ref 0–41)
ANION GAP SERPL CALC-SCNC: 16 MMOL/L — HIGH (ref 7–14)
AST SERPL-CCNC: 6 U/L — SIGNIFICANT CHANGE UP (ref 0–41)
AST SERPL-CCNC: 6 U/L — SIGNIFICANT CHANGE UP (ref 0–41)
BASOPHILS # BLD AUTO: 0.08 K/UL — SIGNIFICANT CHANGE UP (ref 0–0.2)
BASOPHILS NFR BLD AUTO: 1.2 % — HIGH (ref 0–1)
BILIRUB DIRECT SERPL-MCNC: <0.2 MG/DL — SIGNIFICANT CHANGE UP (ref 0–0.3)
BILIRUB INDIRECT FLD-MCNC: >0.1 MG/DL — LOW (ref 0.2–1.2)
BILIRUB SERPL-MCNC: 0.3 MG/DL — SIGNIFICANT CHANGE UP (ref 0.2–1.2)
BILIRUB SERPL-MCNC: 0.3 MG/DL — SIGNIFICANT CHANGE UP (ref 0.2–1.2)
BUN SERPL-MCNC: 68 MG/DL — CRITICAL HIGH (ref 10–20)
CALCIUM SERPL-MCNC: 8.4 MG/DL — SIGNIFICANT CHANGE UP (ref 8.4–10.4)
CHLORIDE SERPL-SCNC: 95 MMOL/L — LOW (ref 98–110)
CO2 SERPL-SCNC: 23 MMOL/L — SIGNIFICANT CHANGE UP (ref 17–32)
CREAT SERPL-MCNC: 6.4 MG/DL — CRITICAL HIGH (ref 0.7–1.5)
EGFR: 9 ML/MIN/1.73M2 — LOW
EOSINOPHIL # BLD AUTO: 0.25 K/UL — SIGNIFICANT CHANGE UP (ref 0–0.7)
EOSINOPHIL NFR BLD AUTO: 3.7 % — SIGNIFICANT CHANGE UP (ref 0–8)
GENTAMICIN SERPL-MCNC: 2.7 — SIGNIFICANT CHANGE UP
GLUCOSE BLDC GLUCOMTR-MCNC: 174 MG/DL — HIGH (ref 70–99)
GLUCOSE BLDC GLUCOMTR-MCNC: 212 MG/DL — HIGH (ref 70–99)
GLUCOSE BLDC GLUCOMTR-MCNC: 83 MG/DL — SIGNIFICANT CHANGE UP (ref 70–99)
GLUCOSE SERPL-MCNC: 76 MG/DL — SIGNIFICANT CHANGE UP (ref 70–99)
HCT VFR BLD CALC: 21.5 % — LOW (ref 42–52)
HGB BLD-MCNC: 7.1 G/DL — LOW (ref 14–18)
IMM GRANULOCYTES NFR BLD AUTO: 0.3 % — SIGNIFICANT CHANGE UP (ref 0.1–0.3)
INR BLD: 1.2 RATIO — SIGNIFICANT CHANGE UP (ref 0.65–1.3)
LYMPHOCYTES # BLD AUTO: 1.21 K/UL — SIGNIFICANT CHANGE UP (ref 1.2–3.4)
LYMPHOCYTES # BLD AUTO: 17.7 % — LOW (ref 20.5–51.1)
MAGNESIUM SERPL-MCNC: 1.9 MG/DL — SIGNIFICANT CHANGE UP (ref 1.8–2.4)
MCHC RBC-ENTMCNC: 29.3 PG — SIGNIFICANT CHANGE UP (ref 27–31)
MCHC RBC-ENTMCNC: 33 G/DL — SIGNIFICANT CHANGE UP (ref 32–37)
MCV RBC AUTO: 88.8 FL — SIGNIFICANT CHANGE UP (ref 80–94)
MONOCYTES # BLD AUTO: 0.46 K/UL — SIGNIFICANT CHANGE UP (ref 0.1–0.6)
MONOCYTES NFR BLD AUTO: 6.7 % — SIGNIFICANT CHANGE UP (ref 1.7–9.3)
NEUTROPHILS # BLD AUTO: 4.81 K/UL — SIGNIFICANT CHANGE UP (ref 1.4–6.5)
NEUTROPHILS NFR BLD AUTO: 70.4 % — SIGNIFICANT CHANGE UP (ref 42.2–75.2)
NRBC # BLD: 0 /100 WBCS — SIGNIFICANT CHANGE UP (ref 0–0)
PLATELET # BLD AUTO: 221 K/UL — SIGNIFICANT CHANGE UP (ref 130–400)
POTASSIUM SERPL-MCNC: 5.1 MMOL/L — HIGH (ref 3.5–5)
POTASSIUM SERPL-SCNC: 5.1 MMOL/L — HIGH (ref 3.5–5)
PROT SERPL-MCNC: 6.9 G/DL — SIGNIFICANT CHANGE UP (ref 6–8)
PROT SERPL-MCNC: 7.1 G/DL — SIGNIFICANT CHANGE UP (ref 6–8)
PROTHROM AB SERPL-ACNC: 13.7 SEC — HIGH (ref 9.95–12.87)
RBC # BLD: 2.42 M/UL — LOW (ref 4.7–6.1)
RBC # FLD: 15.9 % — HIGH (ref 11.5–14.5)
SODIUM SERPL-SCNC: 134 MMOL/L — LOW (ref 135–146)
WBC # BLD: 6.83 K/UL — SIGNIFICANT CHANGE UP (ref 4.8–10.8)
WBC # FLD AUTO: 6.83 K/UL — SIGNIFICANT CHANGE UP (ref 4.8–10.8)

## 2022-12-05 PROCEDURE — 99231 SBSQ HOSP IP/OBS SF/LOW 25: CPT

## 2022-12-05 PROCEDURE — 11043 DBRDMT MUSC&/FSCA 1ST 20/<: CPT | Mod: GC,58,59

## 2022-12-05 PROCEDURE — 11046 DBRDMT MUSC&/FSCA EA ADDL: CPT | Mod: GC,58,59

## 2022-12-05 RX ORDER — PANTOPRAZOLE SODIUM 20 MG/1
40 TABLET, DELAYED RELEASE ORAL
Refills: 0 | Status: DISCONTINUED | OUTPATIENT
Start: 2022-12-05 | End: 2022-12-08

## 2022-12-05 RX ORDER — HEPARIN SODIUM 5000 [USP'U]/ML
5000 INJECTION INTRAVENOUS; SUBCUTANEOUS EVERY 12 HOURS
Refills: 0 | Status: DISCONTINUED | OUTPATIENT
Start: 2022-12-05 | End: 2022-12-08

## 2022-12-05 RX ORDER — POLYETHYLENE GLYCOL 3350 17 G/17G
17 POWDER, FOR SOLUTION ORAL DAILY
Refills: 0 | Status: DISCONTINUED | OUTPATIENT
Start: 2022-12-05 | End: 2022-12-08

## 2022-12-05 RX ADMIN — Medication 60 MICROGRAM(S): at 12:41

## 2022-12-05 RX ADMIN — SEVELAMER CARBONATE 800 MILLIGRAM(S): 2400 POWDER, FOR SUSPENSION ORAL at 09:18

## 2022-12-05 RX ADMIN — ATORVASTATIN CALCIUM 40 MILLIGRAM(S): 80 TABLET, FILM COATED ORAL at 22:20

## 2022-12-05 RX ADMIN — Medication 5 MILLILITER(S): at 11:55

## 2022-12-05 RX ADMIN — Medication 104 MILLIGRAM(S): at 14:27

## 2022-12-05 RX ADMIN — Medication 81 MILLIGRAM(S): at 11:54

## 2022-12-05 RX ADMIN — SEVELAMER CARBONATE 800 MILLIGRAM(S): 2400 POWDER, FOR SUSPENSION ORAL at 18:00

## 2022-12-05 RX ADMIN — Medication 5 MILLILITER(S): at 05:22

## 2022-12-05 RX ADMIN — Medication 40 MILLIGRAM(S): at 05:22

## 2022-12-05 RX ADMIN — TAMSULOSIN HYDROCHLORIDE 0.4 MILLIGRAM(S): 0.4 CAPSULE ORAL at 22:20

## 2022-12-05 RX ADMIN — CLOPIDOGREL BISULFATE 75 MILLIGRAM(S): 75 TABLET, FILM COATED ORAL at 11:55

## 2022-12-05 RX ADMIN — Medication 5 MILLILITER(S): at 22:20

## 2022-12-05 RX ADMIN — INSULIN GLARGINE 10 UNIT(S): 100 INJECTION, SOLUTION SUBCUTANEOUS at 22:19

## 2022-12-05 RX ADMIN — CEFEPIME 100 MILLIGRAM(S): 1 INJECTION, POWDER, FOR SOLUTION INTRAMUSCULAR; INTRAVENOUS at 14:27

## 2022-12-05 RX ADMIN — Medication 1 MILLIGRAM(S): at 11:54

## 2022-12-05 RX ADMIN — Medication 12.5 MILLIGRAM(S): at 05:22

## 2022-12-05 RX ADMIN — HEPARIN SODIUM 5000 UNIT(S): 5000 INJECTION INTRAVENOUS; SUBCUTANEOUS at 05:22

## 2022-12-05 RX ADMIN — SEVELAMER CARBONATE 800 MILLIGRAM(S): 2400 POWDER, FOR SUSPENSION ORAL at 11:55

## 2022-12-05 RX ADMIN — Medication 30 MILLIGRAM(S): at 05:21

## 2022-12-05 RX ADMIN — PREGABALIN 1000 MICROGRAM(S): 225 CAPSULE ORAL at 11:54

## 2022-12-05 RX ADMIN — HEPARIN SODIUM 5000 UNIT(S): 5000 INJECTION INTRAVENOUS; SUBCUTANEOUS at 18:03

## 2022-12-05 RX ADMIN — Medication 12.5 MILLIGRAM(S): at 18:01

## 2022-12-05 NOTE — PROGRESS NOTE ADULT - SUBJECTIVE AND OBJECTIVE BOX
SCHWABACHER, LAWRENCE  62y  [facility] [id number] B      Patient is a 62y old  Male who presents with a chief complaint of Chest pain (30 Nov 2022 13:29)      INTERVAL HPI/OVERNIGHT EVENTS:  Patient feels well. no overnight events   complaining about eye itching           FAMILY HISTORY:  Family history of heart disease (Father)    DM (diabetes mellitus) (Mother)    ESRD (end stage renal disease) on dialysis (Mother)      T(C): 36.2 (11-30-22 @ 15:35), Max: 36.4 (11-29-22 @ 19:19)  HR: 88 (11-30-22 @ 15:35) (71 - 98)  BP: 133/60 (11-30-22 @ 15:35) (123/64 - 163/83)  RR: 20 (11-30-22 @ 15:35) (16 - 22)  SpO2: 95% (11-29-22 @ 20:00) (95% - 100%)  Wt(kg): --Vital Signs Last 24 Hrs  T(C): 36.2 (30 Nov 2022 15:35), Max: 36.4 (29 Nov 2022 19:19)  T(F): 97.2 (30 Nov 2022 15:35), Max: 97.5 (29 Nov 2022 19:19)  HR: 88 (30 Nov 2022 15:35) (71 - 98)  BP: 133/60 (30 Nov 2022 15:35) (123/64 - 163/83)  BP(mean): 104 (29 Nov 2022 17:20) (104 - 104)  RR: 20 (30 Nov 2022 15:35) (16 - 22)  SpO2: 95% (29 Nov 2022 20:00) (95% - 100%)    Parameters below as of 30 Nov 2022 11:15  Patient On (Oxygen Delivery Method): room air        PHYSICAL EXAM:  GENERAL: NAD, well-groomed, well-developed  NERVOUS SYSTEM:  Alert & Oriented X3, Good concentration;  PULM: Clear to auscultation bilaterally  CARDIAC: Regular rate and rhythm  GI: Soft, Nontender, Nondistended; Bowel sounds present  EXTREMITIES:  left foot wrapper     Consultant(s) Notes Reviewed:  [x ] YES  [ ] NO  Care Discussed with Consultants/Other Providers [ x] YES  [ ] NO    LABS:                            7.3    4.70  )-----------( 180      ( 30 Nov 2022 09:36 )             21.4   11-30    137  |  98  |  43<H>  ----------------------------<  154<H>  4.4   |  26  |  4.6<HH>    Ca    8.0<L>      30 Nov 2022 09:36  Mg     1.9     11-30    TPro  6.9  /  Alb  3.4<L>  /  TBili  0.2  /  DBili  <0.2  /  AST  6   /  ALT  <5  /  AlkPhos  141<H>  11-30            acetaminophen     Tablet .. 650 milliGRAM(s) Oral every 6 hours PRN  aspirin enteric coated 81 milliGRAM(s) Oral daily  atorvastatin 40 milliGRAM(s) Oral at bedtime  cefepime   IVPB 2000 milliGRAM(s) IV Intermittent <User Schedule>  clopidogrel Tablet 75 milliGRAM(s) Oral daily  cyanocobalamin 1000 MICROGram(s) Oral daily  darbepoetin Injectable Syringe 60 MICROGram(s) IV Push <User Schedule>  dextrose 5%. 1000 milliLiter(s) IV Continuous <Continuous>  dextrose 5%. 1000 milliLiter(s) IV Continuous <Continuous>  dextrose 50% Injectable 25 Gram(s) IV Push once  dextrose 50% Injectable 12.5 Gram(s) IV Push once  dextrose 50% Injectable 25 Gram(s) IV Push once  dextrose Oral Gel 15 Gram(s) Oral once PRN  famotidine    Tablet 20 milliGRAM(s) Oral daily  folic acid 1 milliGRAM(s) Oral daily  furosemide    Tablet 40 milliGRAM(s) Oral daily  glucagon  Injectable 1 milliGRAM(s) IntraMuscular once  guaifenesin/dextromethorphan Oral Liquid 5 milliLiter(s) Oral three times a day  heparin   Injectable 5000 Unit(s) SubCutaneous every 8 hours  influenza   Vaccine 0.5 milliLiter(s) IntraMuscular once  insulin glargine Injectable (LANTUS) 7 Unit(s) SubCutaneous at bedtime  insulin glargine Injectable (LANTUS) 13 Unit(s) SubCutaneous at bedtime  insulin lispro (ADMELOG) corrective regimen sliding scale   SubCutaneous three times a day before meals  insulin lispro Injectable (ADMELOG) 5 Unit(s) SubCutaneous before breakfast  insulin lispro Injectable (ADMELOG) 5 Unit(s) SubCutaneous before lunch  melatonin 3 milliGRAM(s) Oral at bedtime PRN  metoprolol tartrate 12.5 milliGRAM(s) Oral every 12 hours  NIFEdipine XL 30 milliGRAM(s) Oral daily  regadenoson Injectable 0.4 milliGRAM(s) IV Push once  remdesivir  IVPB 100 milliGRAM(s) IV Intermittent every 24 hours  remdesivir  IVPB   IV Intermittent   senna 2 Tablet(s) Oral at bedtime  sevelamer carbonate 800 milliGRAM(s) Oral three times a day with meals  tamsulosin 0.4 milliGRAM(s) Oral at bedtime    63 yo male, h/o type 2 DM on insulin, CAD s.p stent 2008, s/p CABG 2012 (Dr Smith), PAD s/p angioplasty and stent b/l LE, BPH, ESRD on HD through left AVF (MWF follows with Dr MARY Paula), DFU sp left toe amputation, left calcaneal OM on daptomycin and cefepime post dialysis s.p wound Vac, chronic anemia, HFmrEF, presented to the hospital for chest pain     1. Chest pain. hx of CAD s/p PCI/CABG and PAD s/p angioplasty  ACS/CAD presenting with Chest pain  - Admit to medicine  - Cont aspirin and plavix   - Cont statin 40mg HS   - Cont Nifedipine 30mg po daily   - Troponin stable between 0.7-0.8  NST: FIXED PERFUSION DEFECT INVOLVING ANTERIOR WALL OF THE LEFT VENTRICLE EXTENDING TO APEX CONSISTENT WITH SCAR. GLOBAL HYPOKINESIS WITH POOR THICKENING OF THE ANTERIOR WALL. EF  36 %       2. NSVT  -10 beats of NSVT observed on tele on 11/16 PM  - Cont Metoprolol 12.5 BID  -Maintain K>4 and Mg>2    3. Anemia of chronic disease s/p multiple PRBC  - Monitor CBC, transfuse to keep >8    4.  ESRD on HD   - HD per nephrology  - B/L pitting edema, little urine output    5. Recent hx Left calcaneal OM   - Wound Cx 9/16 - Enterobacter cloacae  - wound CX 10/16 VRE Faecium Enterobacter cloacae complex, Proteus vulgaris, Morganella morganii   - last wound cx 11/29/2022:E.coli and proteus mirabilis   - s/p debridement 10/21 Wound Cx Proteus mirabilis, VRE faecium, Pseudomonas putida  - s/p debridement 10/24 Crumble L calcaneous   - s/p debridement 11/3 with wound vac in place  - Cefepime 2g post HD through 11/30; s/p 3 session of daptomycin post-HD  - On last admission, ID plan for 4 weeks from last debridement (11/3-11/30)  -Debridement done on 11/29/2022.   - ID consult:  a) Need 4 weeks of abs from last debridement. Dc on gentamicin 160mg post HD*1 followed by maintenance 80mg post hd for 4 weeks    - Podiatry consult appreciated     6.  PAD  - s/p angiogram LLE with peroneal artery angioplasty and arterectomy 10/27  - Cont meds as above    7. Type 2 DM  - Adjusted insulin due to hypoglycemia in am  - Lantus dose was decreased     8.  BPH  - c/w Tamsulosin 0.4 mg daily    9. COVID 19 asymptomatic   - Finished a 3 day course of remdisivir     10. Eye itching  - Tear drops     Pending: Podiatry procedure  -DVT prophylaxis: Heparin  -GI prophylaxis: Not indicated  -Diet: Renal, 1.2l fluid restriction  -Code status: Full code      hb is lower than usual. denies melena or blood per rectum. he will be getting erythropoietin today. repeat hb in am   cm working on auth   eye itching trial of tear drops       Case Discussed with House Staff   26 minutes spent on total encounter; more than 50% of the visit was spent counseling and/or coordinating care by the attending physician.   Spectra [phone]

## 2022-12-05 NOTE — PROGRESS NOTE ADULT - ATTENDING COMMENTS
Agree with above plan
Agree with above plan  Please optimize pt for surgery
Agree with above plan
Assessment:  - S/P Excisional Debridement of Soft Tissue & Bone, Left Foot; 10/21/22  - S/P exc dbx st/b Left foot; 10/24/22  - s/p exc dbx st/b left foot; 11/29/22    Plan:  Chart reviewed and Patient evaluated. All Questions and Concerns Addressed and Answered  Bedside Aseptic Debridement of Right Foot Wound performed under sterile conditions to level of Fascia w/#15 Surgical Blade; Pt tolerated procedure well w/no complications;  Local Wound Care; Wound Flushed w/ NS; Wound Dressed w/Xochilt / DSD / ABD / Kerlix / ACE; Q24;  - XOCHILT at Bedside   Next Podiatry Eval: Wed 12/7/22;  Local Wound Care; As Stated Above;  Weight Bearing Status; *STRICT Non-Weighbearing Left Foot**   No Further Sx Debridement;   Patient stable from podiatry standpoint; Pt can f/u with Dr. Pacheco or Dr. Zarate in o/p Podiatry Clinic 1 Week Post-DSC;   -Dr. Zarate Clinic: [address]  Discussed Plan w/ Attending; Dr. Zarate    Podiatry 
HD today, 3L UF as tolerated  rest of plan as above

## 2022-12-05 NOTE — PROGRESS NOTE ADULT - ASSESSMENT
63 yo male, h/o type 2 DM on insulin, CAD s.p stent 2008, s/p CABG 2012 (Dr Smith), PAD s/p angioplasty and stent b/l LE, BPH, ESRD on HD through left AVF (MWF follows with Dr MARY Paula), DFU sp left toe amputation, left calcaneal OM on daptomycin and cefepime post dialysis s.p wound Vac, chronic anemia, HFmrEF, presented to the hospital for chest pain.  ESRD on HD/ Anemia / Chest pain/ OM on abx  next HD today standard bath uf 2 liters as tolerated  RAMSEY weekly with 60 mcg-increase/ transfuse if Hb< 7  on sevelamer 800 mg po tid/ phos at goal   on lasix non oliguric    BP controlled  s/p debridement with podiatry 11/21 and 11/29 has calcaneal OM - as per ID : Gentamicin 160 mg x 1 for load, followed by 80 mg (1 mg/kg Adjusted BW) post HD / plan at least  4 weeks from debridement given evidence of calcaneal infection in OR / will need Gentamicin level drawn pre HD to ensure levels < 2   will follow

## 2022-12-05 NOTE — PROGRESS NOTE ADULT - SUBJECTIVE AND OBJECTIVE BOX
Podiatry Progress Note    Subjective:   SCHWABACHER, LAWRENCE is a pleasant well-nourished, well developed 62y Male in no acute distress, alert awake, and oriented to person, place and time.  Patient is a 62y old  Male who presents with a chief complaint of Chest pain (05 Dec 2022 08:09). Pt seen & evaluated at bedside w/Attending. Pt denies N/V/F/C/pain. No new pedal complaints.       PAST MEDICAL & SURGICAL HISTORY:  CAD (coronary artery disease)  1 stent 2008      S/P CABG (coronary artery bypass graft)  x4      Diabetes mellitus      Transient ischemic attack (TIA)  2017; 2008      Chronic kidney disease (CKD)  Stage IV      Hypertension      Stented coronary artery  in 2008      Myocardial infarction  2012      PAD (peripheral artery disease)  S/p bypass left leg      HLD (hyperlipidemia)      BPH (benign prostatic hyperplasia)      Pain in left knee  s/p fall      OA (osteoarthritis)      [place] (hard of hearing)      Chronic anemia      S/P CABG (coronary artery bypass graft)  2012      H/O arterial bypass of lower limb  Left Lower Extremity (2016)      History of surgery  Left CEA (2017)  Left Pinkie toe Amputation (2014)  CABG x 4 (2012)  Card cath - stent (2008)        AV fistula  2019  LEFT AV FISTULA           Objective:  Vital Signs Last 24 Hrs  T(C): 36.6 (05 Dec 2022 08:07), Max: 36.7 (04 Dec 2022 16:00)  T(F): 97.9 (05 Dec 2022 08:07), Max: 98 (04 Dec 2022 16:00)  HR: 82 (05 Dec 2022 08:07) (82 - 85)  BP: 140/65 (05 Dec 2022 08:07) (133/65 - 157/59)  BP(mean): --  RR: 18 (05 Dec 2022 08:07) (18 - 18)  SpO2: 100% (04 Dec 2022 16:00) (100% - 100%)    Parameters below as of 04 Dec 2022 16:00  Patient On (Oxygen Delivery Method): room air                            7.1    6.83  )-----------( 221      ( 05 Dec 2022 06:58 )             21.5                 12-05    134<L>  |  95<L>  |  68<HH>  ----------------------------<  76  5.1<H>   |  23  |  6.4<HH>    Ca    8.4      05 Dec 2022 06:58  Mg     1.9     12-05    TPro  7.1  /  Alb  3.5  /  TBili  0.3  /  DBili  x   /  AST  6   /  ALT  <5  /  AlkPhos  144<H>  12-05        ----------------------------------------      Physical Exam - Left Lower Extremity Focused:   Derm: Open Left Plantar Ulcer @ Plantar Medial Rearfoot;    -Wound Base Fibrogranular/Necrotic; 60% Granular, 30% Fibrotic, 10% Necrotic; No Active Drainage/Bleeding;     -Wound Margins Irregular; Mild hyperkeratosis;    -No Malodor;  Skin RLE Thin, Atrophic;  Vascular: DP and PT Pulses Diminished; Foot is Warm to Warm to the touch; Capillary Refill Time < 3 Seconds;    Neuro: Protective Sensation Diminished   MSK: No Pain On Palpation at Wound Site     Assessment:  - S/P Excisional Debridement of Soft Tissue & Bone, Left Foot; 10/21/22  - S/P exc dbx st/b Left foot; 10/24/22  - s/p exc dbx st/b left foot; 11/29/22    Plan:  Chart reviewed and Patient evaluated. All Questions and Concerns Addressed and Answered  Bedside Aseptic Debridement of Right Foot Wound performed under sterile conditions to level of Fascia w/#15 Surgical Blade; Pt tolerated procedure well w/no complications;  Local Wound Care; Wound Flushed w/ NS; Wound Dressed w/Xochilt / DSD / ABD / Kerlix / ACE; Q24;    Local Wound Care; As Stated Above;  Weight Bearing Status; *STRICT Non-Weighbearing Left Foot**   No Further Sx Debridement;   Patient stable from podiatry standpoint; Pt can f/u with Dr. Pacheco or Dr. Zarate in o/p Podiatry Clinic 1 Week Post-DSC;   Dr. Zarate Clinic: [address] [place] [address]  Discussed Plan w/ Attending; Dr. Zarate    Podiatry      Podiatry Progress Note    Subjective:   SCHWABACHER, LAWRENCE is a pleasant well-nourished, well developed 62y Male in no acute distress, alert awake, and oriented to person, place and time.  Patient is a 62y old  Male who presents with a chief complaint of Chest pain (05 Dec 2022 08:09). Pt seen & evaluated at bedside w/Attending. Pt denies N/V/F/C/pain. No new pedal complaints.       PAST MEDICAL & SURGICAL HISTORY:  CAD (coronary artery disease)  1 stent 2008      S/P CABG (coronary artery bypass graft)  x4      Diabetes mellitus      Transient ischemic attack (TIA)  2017; 2008      Chronic kidney disease (CKD)  Stage IV      Hypertension      Stented coronary artery  in 2008      Myocardial infarction  2012      PAD (peripheral artery disease)  S/p bypass left leg      HLD (hyperlipidemia)      BPH (benign prostatic hyperplasia)      Pain in left knee  s/p fall      OA (osteoarthritis)      [place] (hard of hearing)      Chronic anemia      S/P CABG (coronary artery bypass graft)  2012      H/O arterial bypass of lower limb  Left Lower Extremity (2016)      History of surgery  Left CEA (2017)  Left Pinkie toe Amputation (2014)  CABG x 4 (2012)  Card cath - stent (2008)        AV fistula  2019  LEFT AV FISTULA           Objective:  Vital Signs Last 24 Hrs  T(C): 36.6 (05 Dec 2022 08:07), Max: 36.7 (04 Dec 2022 16:00)  T(F): 97.9 (05 Dec 2022 08:07), Max: 98 (04 Dec 2022 16:00)  HR: 82 (05 Dec 2022 08:07) (82 - 85)  BP: 140/65 (05 Dec 2022 08:07) (133/65 - 157/59)  BP(mean): --  RR: 18 (05 Dec 2022 08:07) (18 - 18)  SpO2: 100% (04 Dec 2022 16:00) (100% - 100%)    Parameters below as of 04 Dec 2022 16:00  Patient On (Oxygen Delivery Method): room air                            7.1    6.83  )-----------( 221      ( 05 Dec 2022 06:58 )             21.5                 12-05    134<L>  |  95<L>  |  68<HH>  ----------------------------<  76  5.1<H>   |  23  |  6.4<HH>    Ca    8.4      05 Dec 2022 06:58  Mg     1.9     12-05    TPro  7.1  /  Alb  3.5  /  TBili  0.3  /  DBili  x   /  AST  6   /  ALT  <5  /  AlkPhos  144<H>  12-05        ----------------------------------------      Physical Exam - Left Lower Extremity Focused:   Derm: Open Left Plantar Ulcer @ Plantar Medial Rearfoot;    -Wound Base Fibrogranular/Necrotic; 60% Granular, 30% Fibrotic, 10% Necrotic; No Active Drainage/Bleeding;     -Wound Margins Irregular; Mild hyperkeratosis;    -No Malodor;  Skin RLE Thin, Atrophic;  Vascular: DP and PT Pulses Diminished; Foot is Warm to Warm to the touch; Capillary Refill Time < 3 Seconds;    Neuro: Protective Sensation Diminished   MSK: No Pain On Palpation at Wound Site     Assessment:  - S/P Excisional Debridement of Soft Tissue & Bone, Left Foot; 10/21/22  - S/P exc dbx st/b Left foot; 10/24/22  - s/p exc dbx st/b left foot; 11/29/22    Plan:  Chart reviewed and Patient evaluated. All Questions and Concerns Addressed and Answered  Bedside Aseptic Debridement of Right Foot Wound performed under sterile conditions to level of Fascia w/#15 Surgical Blade; Pt tolerated procedure well w/no complications;  Local Wound Care; Wound Flushed w/ NS; Wound Dressed w/Xochilt / DSD / ABD / Kerlix / ACE; Q24;    Next Podiatry Eval: Wed 12/7/22;  Local Wound Care; As Stated Above;  Weight Bearing Status; *STRICT Non-Weighbearing Left Foot**   No Further Sx Debridement;   Patient stable from podiatry standpoint; Pt can f/u with Dr. Pacheco or Dr. Zarate in o/p Podiatry Clinic 1 Week Post-DSC;   -Dr. Zarate Clinic: [address] [place] [address]  Discussed Plan w/ Attending; Dr. Zarate    Podiatry      Podiatry Progress Note    Subjective:   SCHWABACHER, LAWRENCE is a pleasant well-nourished, well developed 62y Male in no acute distress, alert awake, and oriented to person, place and time.  Patient is a 62y old  Male who presents with a chief complaint of Chest pain (05 Dec 2022 08:09). Pt seen & evaluated at bedside w/Attending. Pt denies N/V/F/C/pain. No new pedal complaints.       PAST MEDICAL & SURGICAL HISTORY:  CAD (coronary artery disease)  1 stent 2008      S/P CABG (coronary artery bypass graft)  x4      Diabetes mellitus      Transient ischemic attack (TIA)  2017; 2008      Chronic kidney disease (CKD)  Stage IV      Hypertension      Stented coronary artery  in 2008      Myocardial infarction  2012      PAD (peripheral artery disease)  S/p bypass left leg      HLD (hyperlipidemia)      BPH (benign prostatic hyperplasia)      Pain in left knee  s/p fall      OA (osteoarthritis)      [place] (hard of hearing)      Chronic anemia      S/P CABG (coronary artery bypass graft)  2012      H/O arterial bypass of lower limb  Left Lower Extremity (2016)      History of surgery  Left CEA (2017)  Left Pinkie toe Amputation (2014)  CABG x 4 (2012)  Card cath - stent (2008)        AV fistula  2019  LEFT AV FISTULA           Objective:  Vital Signs Last 24 Hrs  T(C): 36.6 (05 Dec 2022 08:07), Max: 36.7 (04 Dec 2022 16:00)  T(F): 97.9 (05 Dec 2022 08:07), Max: 98 (04 Dec 2022 16:00)  HR: 82 (05 Dec 2022 08:07) (82 - 85)  BP: 140/65 (05 Dec 2022 08:07) (133/65 - 157/59)  BP(mean): --  RR: 18 (05 Dec 2022 08:07) (18 - 18)  SpO2: 100% (04 Dec 2022 16:00) (100% - 100%)    Parameters below as of 04 Dec 2022 16:00  Patient On (Oxygen Delivery Method): room air                            7.1    6.83  )-----------( 221      ( 05 Dec 2022 06:58 )             21.5                 12-05    134<L>  |  95<L>  |  68<HH>  ----------------------------<  76  5.1<H>   |  23  |  6.4<HH>    Ca    8.4      05 Dec 2022 06:58  Mg     1.9     12-05    TPro  7.1  /  Alb  3.5  /  TBili  0.3  /  DBili  x   /  AST  6   /  ALT  <5  /  AlkPhos  144<H>  12-05        ----------------------------------------      Physical Exam - Left Lower Extremity Focused:   Derm: Open Left Plantar Ulcer @ Plantar Medial Rearfoot;    -Wound Base Fibrogranular/Necrotic; 60% Granular, 30% Fibrotic, 10% Necrotic; No Active Drainage/Bleeding;     -Wound Margins Irregular; Mild hyperkeratosis;    -No Malodor;    -Size 8.8cm x 5.8cm   Skin RLE Thin, Atrophic;  Vascular: DP and PT Pulses Diminished; Foot is Warm to Warm to the touch; Capillary Refill Time < 3 Seconds;    Neuro: Protective Sensation Diminished   MSK: No Pain On Palpation at Wound Site     Assessment:  - S/P Excisional Debridement of Soft Tissue & Bone, Left Foot; 10/21/22  - S/P exc dbx st/b Left foot; 10/24/22  - s/p exc dbx st/b left foot; 11/29/22    Plan:  Chart reviewed and Patient evaluated. All Questions and Concerns Addressed and Answered  Bedside Aseptic Debridement of Right Foot Wound performed under sterile conditions to level of Fascia w/#15 Surgical Blade; Pt tolerated procedure well w/no complications;  Local Wound Care; Wound Flushed w/ NS; Wound Dressed w/Megan / DSD / ABD / Kerlix / ACE; Q24;  - MEGAN at Bedside   Next Podiatry Eval: Wed 12/7/22;  Local Wound Care; As Stated Above;  Weight Bearing Status; *STRICT Non-Weighbearing Left Foot**   No Further Sx Debridement;   Patient stable from podiatry standpoint; Pt can f/u with Dr. Pacheco or Dr. Zarate in o/p Podiatry Clinic 1 Week Post-DSC;   -Dr. Zarate Clinic: [address] [place] [address]  Discussed Plan w/ Attending; Dr. Zarate    Podiatry

## 2022-12-05 NOTE — CHART NOTE - NSCHARTNOTEFT_GEN_A_CORE
Registered Dietitian Follow-Up     Patient Profile Reviewed                           Yes [x]   No []     Nutrition History Previously Obtained        Yes [x]  No []       Pertinent Subjective Information: Pt reports optimum po/appetite at admit, consuming >75% of meals. This morning pt consumed 100% of breakfast.      Pertinent Medical Interventions:  #ESRD on HD/ Anemia / Chest pain/ OM on abx  - S/P Excisional Debridement of Soft Tissue & Bone, Left Foot; 10/21/22  - S/P exc dbx st/b Left foot; 10/24/22  - s/p exc dbx st/b left foot; 22     Diet order: Diet, NPO after Midnight:      NPO Start Date: 2022,   NPO Start Time: 23:59 (22 @ 15:47) [Active]  Diet, Renal Restrictions:   For patients receiving Renal Replacement - No Protein Restr, No Conc K, No Conc Phos, Low Sodium  Consistent Carbohydrate {No Snacks}  1200mL Fluid Restriction ([id number]) (22 @ 08:09) [Active]    Anthropometrics:  Height (cm): 180.3 (22 @ 17:20)  Weight (kg): 90.8 (22 @ 17:20)  BMI (kg/m2): 27.9 (22 @ 17:20)  IBW: 78.1kg     Daily Weight in k.3 (-), Weight in k.4 (), 90.7 () -- Mild fluctuations in weight likely related to fluid shifts 2/2 HD    MEDICATIONS  (STANDING):  aspirin enteric coated 81 milliGRAM(s) Oral daily  atorvastatin 40 milliGRAM(s) Oral at bedtime  cefepime   IVPB 2000 milliGRAM(s) IV Intermittent <User Schedule>  clopidogrel Tablet 75 milliGRAM(s) Oral daily  cyanocobalamin 1000 MICROGram(s) Oral daily  darbepoetin Injectable Syringe 60 MICROGram(s) IV Push <User Schedule>  dextrose 5%. 1000 milliLiter(s) (100 mL/Hr) IV Continuous <Continuous>  dextrose 5%. 1000 milliLiter(s) (50 mL/Hr) IV Continuous <Continuous>  dextrose 50% Injectable 25 Gram(s) IV Push once  dextrose 50% Injectable 12.5 Gram(s) IV Push once  dextrose 50% Injectable 25 Gram(s) IV Push once  folic acid 1 milliGRAM(s) Oral daily  furosemide    Tablet 40 milliGRAM(s) Oral daily  gentamicin   IVPB 80 milliGRAM(s) IV Intermittent <User Schedule>  glucagon  Injectable 1 milliGRAM(s) IntraMuscular once  guaifenesin/dextromethorphan Oral Liquid 5 milliLiter(s) Oral three times a day  heparin   Injectable 5000 Unit(s) SubCutaneous every 12 hours  insulin glargine Injectable (LANTUS) 10 Unit(s) SubCutaneous at bedtime  insulin lispro (ADMELOG) corrective regimen sliding scale   SubCutaneous three times a day before meals  insulin lispro Injectable (ADMELOG) 5 Unit(s) SubCutaneous before breakfast  insulin lispro Injectable (ADMELOG) 5 Unit(s) SubCutaneous before lunch  metoprolol tartrate 12.5 milliGRAM(s) Oral every 12 hours  NIFEdipine XL 30 milliGRAM(s) Oral daily  pantoprazole    Tablet 40 milliGRAM(s) Oral before breakfast  polyethylene glycol 3350 17 Gram(s) Oral daily  regadenoson Injectable 0.4 milliGRAM(s) IV Push once  senna 2 Tablet(s) Oral at bedtime  sevelamer carbonate 800 milliGRAM(s) Oral three times a day with meals  tamsulosin 0.4 milliGRAM(s) Oral at bedtime    MEDICATIONS  (PRN):  dextrose Oral Gel 15 Gram(s) Oral once PRN Blood Glucose LESS THAN 70 milliGRAM(s)/deciliter  melatonin 3 milliGRAM(s) Oral at bedtime PRN Insomnia    Pertinent Labs:  @ 06:58: Na 134<L>, BUN 68<HH>, Cr 6.4<HH>, BG 76, K+ 5.1<H>, Phos --, Mg 1.9, Alk Phos 145<H>, ALT/SGPT <5, AST/SGOT 6, HbA1c --    Finger Sticks:  POCT Blood Glucose.: 83 mg/dL ( @ 07:54)  POCT Blood Glucose.: 167 mg/dL ( @ 21:06)  POCT Blood Glucose.: 165 mg/dL ( @ 16:46)    Physical Findings:  - Appearance: WDL; no edema   - GI function: No discomfort, last bowel movement  per pt   - Tubes: n/a   - Oral/Mouth cavity: no chewing/swallowing difficulty reported   - Skin: excoriation      Nutrition Requirements: per prev RD assessment   Weight Used: 90.8 kg ABW     Estimated Energy Needs    Continue [x]  Adjust []  1724 - 2241 kcal/day (1.0 - 1.3 AF -- due to overwt BMI vs. HD)    Estimated Protein Needs    Continue [x]  Adjust []  109 - 118 gm/day (1.2 - 1.3 gm/kg) - same reason as above)     Estimated Fluid Needs        Continue [x]  Adjust []  2274 mL/day (25 mL/kcal) - with consideration for HD     Nutrient Intake: EMR PO doc: -: %      [x] Previous Nutrition Diagnosis: Increase Nutrient Needs            [x] Ongoing          [] Resolved     Nutrition Intervention: meals and snacks, medical food supplements     Goal/Expected Outcome: Pt to consume and tolerate >75% of meals/snacks and PO supplements in 6 days.      Nutrition Monitoring: diet order,energy intake,body composition,NFPF, renal/electrolyte profile     Recommendations:  1. Add Nepro daily (425kcal/19g PRO) to meet increased needs  2. Cont w/ current diet order   3. encourage and assist w/ PO intake   4. obtain daily wts     Paul via TEAMS   RD remains available: Micheline Gatica RDN [phone].  low risk f/u in 10 days

## 2022-12-05 NOTE — CHART NOTE - NSCHARTNOTESELECT_GEN_ALL_CORE
Event Note
NSVT/Event Note
Event Note
Nutrition/Event Note
Nutrition/Event Note
RD Follow Up/Event Note

## 2022-12-06 LAB
ALBUMIN SERPL ELPH-MCNC: 3.4 G/DL — LOW (ref 3.5–5.2)
ALBUMIN SERPL ELPH-MCNC: 3.6 G/DL — SIGNIFICANT CHANGE UP (ref 3.5–5.2)
ALP SERPL-CCNC: 140 U/L — HIGH (ref 30–115)
ALP SERPL-CCNC: 143 U/L — HIGH (ref 30–115)
ALT FLD-CCNC: <5 U/L — SIGNIFICANT CHANGE UP (ref 0–41)
ALT FLD-CCNC: <5 U/L — SIGNIFICANT CHANGE UP (ref 0–41)
ANION GAP SERPL CALC-SCNC: 14 MMOL/L — SIGNIFICANT CHANGE UP (ref 7–14)
AST SERPL-CCNC: 5 U/L — SIGNIFICANT CHANGE UP (ref 0–41)
AST SERPL-CCNC: 5 U/L — SIGNIFICANT CHANGE UP (ref 0–41)
BASOPHILS # BLD AUTO: 0.06 K/UL — SIGNIFICANT CHANGE UP (ref 0–0.2)
BASOPHILS NFR BLD AUTO: 1 % — SIGNIFICANT CHANGE UP (ref 0–1)
BILIRUB DIRECT SERPL-MCNC: <0.2 MG/DL — SIGNIFICANT CHANGE UP (ref 0–0.3)
BILIRUB INDIRECT FLD-MCNC: >0.1 MG/DL — LOW (ref 0.2–1.2)
BILIRUB SERPL-MCNC: 0.2 MG/DL — SIGNIFICANT CHANGE UP (ref 0.2–1.2)
BILIRUB SERPL-MCNC: 0.3 MG/DL — SIGNIFICANT CHANGE UP (ref 0.2–1.2)
BUN SERPL-MCNC: 42 MG/DL — HIGH (ref 10–20)
CALCIUM SERPL-MCNC: 8.4 MG/DL — SIGNIFICANT CHANGE UP (ref 8.4–10.5)
CHLORIDE SERPL-SCNC: 105 MMOL/L — SIGNIFICANT CHANGE UP (ref 98–110)
CO2 SERPL-SCNC: 27 MMOL/L — SIGNIFICANT CHANGE UP (ref 17–32)
CREAT SERPL-MCNC: 5 MG/DL — CRITICAL HIGH (ref 0.7–1.5)
EGFR: 12 ML/MIN/1.73M2 — LOW
EOSINOPHIL # BLD AUTO: 0.16 K/UL — SIGNIFICANT CHANGE UP (ref 0–0.7)
EOSINOPHIL NFR BLD AUTO: 2.8 % — SIGNIFICANT CHANGE UP (ref 0–8)
GLUCOSE BLDC GLUCOMTR-MCNC: 147 MG/DL — HIGH (ref 70–99)
GLUCOSE BLDC GLUCOMTR-MCNC: 172 MG/DL — HIGH (ref 70–99)
GLUCOSE BLDC GLUCOMTR-MCNC: 194 MG/DL — HIGH (ref 70–99)
GLUCOSE BLDC GLUCOMTR-MCNC: 96 MG/DL — SIGNIFICANT CHANGE UP (ref 70–99)
GLUCOSE SERPL-MCNC: 89 MG/DL — SIGNIFICANT CHANGE UP (ref 70–99)
HCT VFR BLD CALC: 22.2 % — LOW (ref 42–52)
HGB BLD-MCNC: 7.4 G/DL — LOW (ref 14–18)
IMM GRANULOCYTES NFR BLD AUTO: 0.3 % — SIGNIFICANT CHANGE UP (ref 0.1–0.3)
INR BLD: 1.23 RATIO — SIGNIFICANT CHANGE UP (ref 0.65–1.3)
LYMPHOCYTES # BLD AUTO: 0.8 K/UL — LOW (ref 1.2–3.4)
LYMPHOCYTES # BLD AUTO: 14 % — LOW (ref 20.5–51.1)
MAGNESIUM SERPL-MCNC: 2 MG/DL — SIGNIFICANT CHANGE UP (ref 1.8–2.4)
MCHC RBC-ENTMCNC: 29.4 PG — SIGNIFICANT CHANGE UP (ref 27–31)
MCHC RBC-ENTMCNC: 33.3 G/DL — SIGNIFICANT CHANGE UP (ref 32–37)
MCV RBC AUTO: 88.1 FL — SIGNIFICANT CHANGE UP (ref 80–94)
MONOCYTES # BLD AUTO: 0.45 K/UL — SIGNIFICANT CHANGE UP (ref 0.1–0.6)
MONOCYTES NFR BLD AUTO: 7.9 % — SIGNIFICANT CHANGE UP (ref 1.7–9.3)
NEUTROPHILS # BLD AUTO: 4.23 K/UL — SIGNIFICANT CHANGE UP (ref 1.4–6.5)
NEUTROPHILS NFR BLD AUTO: 74 % — SIGNIFICANT CHANGE UP (ref 42.2–75.2)
NRBC # BLD: 0 /100 WBCS — SIGNIFICANT CHANGE UP (ref 0–0)
PHOSPHATE SERPL-MCNC: 3.6 MG/DL — SIGNIFICANT CHANGE UP (ref 2.1–4.9)
PLATELET # BLD AUTO: 208 K/UL — SIGNIFICANT CHANGE UP (ref 130–400)
POTASSIUM SERPL-MCNC: 5 MMOL/L — SIGNIFICANT CHANGE UP (ref 3.5–5)
POTASSIUM SERPL-SCNC: 5 MMOL/L — SIGNIFICANT CHANGE UP (ref 3.5–5)
PROT SERPL-MCNC: 6.8 G/DL — SIGNIFICANT CHANGE UP (ref 6–8)
PROT SERPL-MCNC: 7.1 G/DL — SIGNIFICANT CHANGE UP (ref 6–8)
PROTHROM AB SERPL-ACNC: 14.1 SEC — HIGH (ref 9.95–12.87)
RBC # BLD: 2.52 M/UL — LOW (ref 4.7–6.1)
RBC # FLD: 15.8 % — HIGH (ref 11.5–14.5)
SODIUM SERPL-SCNC: 146 MMOL/L — SIGNIFICANT CHANGE UP (ref 135–146)
WBC # BLD: 5.72 K/UL — SIGNIFICANT CHANGE UP (ref 4.8–10.8)
WBC # FLD AUTO: 5.72 K/UL — SIGNIFICANT CHANGE UP (ref 4.8–10.8)

## 2022-12-06 PROCEDURE — 99239 HOSP IP/OBS DSCHRG MGMT >30: CPT

## 2022-12-06 RX ORDER — GENTAMICIN SULFATE 40 MG/ML
35 VIAL (ML) INJECTION
Qty: 450 | Refills: 0
Start: 2022-12-06 | End: 2023-01-04

## 2022-12-06 RX ORDER — POLYETHYLENE GLYCOL 3350 17 G/17G
17 POWDER, FOR SOLUTION ORAL ONCE
Refills: 0 | Status: DISCONTINUED | OUTPATIENT
Start: 2022-12-06 | End: 2022-12-08

## 2022-12-06 RX ORDER — INSULIN DETEMIR 100/ML (3)
13 INSULIN PEN (ML) SUBCUTANEOUS
Qty: 0 | Refills: 0 | DISCHARGE

## 2022-12-06 RX ORDER — FOLIC ACID 0.8 MG
1 TABLET ORAL
Qty: 30 | Refills: 0
Start: 2022-12-06 | End: 2023-01-04

## 2022-12-06 RX ORDER — PREGABALIN 225 MG/1
1 CAPSULE ORAL
Qty: 30 | Refills: 0
Start: 2022-12-06 | End: 2023-01-04

## 2022-12-06 RX ORDER — POLYETHYLENE GLYCOL 3350 17 G/17G
17 POWDER, FOR SOLUTION ORAL
Qty: 0 | Refills: 0 | DISCHARGE
Start: 2022-12-06

## 2022-12-06 RX ORDER — PANTOPRAZOLE SODIUM 20 MG/1
1 TABLET, DELAYED RELEASE ORAL
Qty: 0 | Refills: 0 | DISCHARGE
Start: 2022-12-06

## 2022-12-06 RX ADMIN — PREGABALIN 1000 MICROGRAM(S): 225 CAPSULE ORAL at 12:43

## 2022-12-06 RX ADMIN — PANTOPRAZOLE SODIUM 40 MILLIGRAM(S): 20 TABLET, DELAYED RELEASE ORAL at 06:22

## 2022-12-06 RX ADMIN — INSULIN GLARGINE 10 UNIT(S): 100 INJECTION, SOLUTION SUBCUTANEOUS at 21:17

## 2022-12-06 RX ADMIN — ATORVASTATIN CALCIUM 40 MILLIGRAM(S): 80 TABLET, FILM COATED ORAL at 21:18

## 2022-12-06 RX ADMIN — Medication 1: at 17:20

## 2022-12-06 RX ADMIN — Medication 5 MILLILITER(S): at 21:18

## 2022-12-06 RX ADMIN — POLYETHYLENE GLYCOL 3350 17 GRAM(S): 17 POWDER, FOR SOLUTION ORAL at 12:44

## 2022-12-06 RX ADMIN — SEVELAMER CARBONATE 800 MILLIGRAM(S): 2400 POWDER, FOR SUSPENSION ORAL at 12:42

## 2022-12-06 RX ADMIN — Medication 5 MILLILITER(S): at 12:44

## 2022-12-06 RX ADMIN — CLOPIDOGREL BISULFATE 75 MILLIGRAM(S): 75 TABLET, FILM COATED ORAL at 12:44

## 2022-12-06 RX ADMIN — TAMSULOSIN HYDROCHLORIDE 0.4 MILLIGRAM(S): 0.4 CAPSULE ORAL at 21:18

## 2022-12-06 RX ADMIN — Medication 40 MILLIGRAM(S): at 06:22

## 2022-12-06 RX ADMIN — Medication 1 MILLIGRAM(S): at 12:42

## 2022-12-06 RX ADMIN — Medication 5 MILLILITER(S): at 06:23

## 2022-12-06 RX ADMIN — SEVELAMER CARBONATE 800 MILLIGRAM(S): 2400 POWDER, FOR SUSPENSION ORAL at 08:42

## 2022-12-06 RX ADMIN — HEPARIN SODIUM 5000 UNIT(S): 5000 INJECTION INTRAVENOUS; SUBCUTANEOUS at 06:24

## 2022-12-06 RX ADMIN — Medication 30 MILLIGRAM(S): at 06:22

## 2022-12-06 RX ADMIN — SENNA PLUS 2 TABLET(S): 8.6 TABLET ORAL at 21:18

## 2022-12-06 RX ADMIN — Medication 81 MILLIGRAM(S): at 12:43

## 2022-12-06 RX ADMIN — Medication 12.5 MILLIGRAM(S): at 06:22

## 2022-12-06 RX ADMIN — Medication 12.5 MILLIGRAM(S): at 17:17

## 2022-12-06 RX ADMIN — HEPARIN SODIUM 5000 UNIT(S): 5000 INJECTION INTRAVENOUS; SUBCUTANEOUS at 17:16

## 2022-12-06 RX ADMIN — SEVELAMER CARBONATE 800 MILLIGRAM(S): 2400 POWDER, FOR SUSPENSION ORAL at 17:17

## 2022-12-06 NOTE — PROGRESS NOTE ADULT - ASSESSMENT
· Assessment	  63 yo male, h/o type 2 DM on insulin, CAD s.p stent 2008, s/p CABG 2012 (Dr Smith), PAD s/p angioplasty and stent b/l LE, BPH, ESRD on HD through left AVF (MWF follows with Dr MARY Paula), DFU sp left toe amputation, left calcaneal OM on daptomycin and cefepime post dialysis s.p wound Vac, chronic anemia, HFmrEF, presented to the hospital for chest pain.    ESRD on HD/ Anemia / Chest pain/ OM on abx  Pt had HD yesterday  Monday  RAMSEY weekly with 60 mcg-increase/ transfuse if Hb< 7  on sevelamer 800 mg po tid/ phos at goal   on lasix non oliguric    BP controlled  s/p debridement with podiatry 11/21 and 11/29 has calcaneal OM - as per ID : Gentamicin 160 mg x 1 for load, followed by 80 mg (1 mg/kg Adjusted BW) post HD / plan at least  4 weeks from debridement given evidence of calcaneal infection in OR /  PLEASE REDUCE Gentamicin to 70 mg post HD - level drawn pre HD on 12/5 was 2.7   please draw Genta trough level pre-HD in the Nursing Home to be reported to the pt's Nephrologist (not medical doctor) to adjust the dose to ensure levels < 2   will follow

## 2022-12-06 NOTE — PROGRESS NOTE ADULT - SUBJECTIVE AND OBJECTIVE BOX
LAWRENCE SCHWABACHER 62y Male  MRN#: [id number]   CODE STATUS:________    Hospital Day: 22d    Pt is currently admitted with the primary diagnosis of chest pain.     SUBJECTIVE  Hospital course: 63 yo male, h/o type 2 DM on insulin, CAD s.p stent 2008, s/p CABG 2012 (Dr Smith), PAD s/p angioplasty and stent b/l LE, BPH, ESRD on HD through left AVF (MWF follows with Dr MARY Paula), DFU sp left toe amputation, left calcaneal OM on daptomycin and cefepime post dialysis s.p wound Vac, chronic anemia, HFmrEF, presented to the hospital for chest pain.   Pt had podiatry procedure on 11/29. pending placement in SNF>     Overnight events: none    Subjective complaints: none    Present Today:   - [place]acey:  No [  ], Yes [   ] : Indication:     - Type of IV Access:       .. CVC/Piccline:  No [  ], Yes [   ] : Indication:       .. Midline: No [  ], Yes [   ] : Indication:                                             ----------------------------------------------------------  OBJECTIVE  PAST MEDICAL & SURGICAL HISTORY  CAD (coronary artery disease)  1 stent 2008    S/P CABG (coronary artery bypass graft)  x4    Diabetes mellitus    Transient ischemic attack (TIA)  2017; 2008    Chronic kidney disease (CKD)  Stage IV    Hypertension    Stented coronary artery  in 2008    Myocardial infarction  2012    PAD (peripheral artery disease)  S/p bypass left leg    HLD (hyperlipidemia)    BPH (benign prostatic hyperplasia)    Pain in left knee  s/p fall    OA (osteoarthritis)    [place] (hard of hearing)    Chronic anemia    S/P CABG (coronary artery bypass graft)  2012    H/O arterial bypass of lower limb  Left Lower Extremity (2016)    History of surgery  Left CEA (2017)  Left Pinkie toe Amputation (2014)  CABG x 4 (2012)  Card cath - stent (2008)      AV fistula  2019  LEFT AV FISTULA                                              -----------------------------------------------------------  ALLERGIES:  No Known Allergies                                            ------------------------------------------------------------    HOME MEDICATIONS  Home Medications:  aspirin 81 mg oral tablet: 1 tab(s) orally once a day (14 Sep 2022 17:42)  furosemide 40 mg oral tablet: 1 tab(s) orally once a day (14 Nov 2022 07:59)  guaifenesin-dextromethorphan 100 mg-10 mg/5 mL oral liquid: 5 milliliter(s) orally 3 times a day (01 Dec 2022 12:01)  Levemir 100 units/mL subcutaneous solution: 13 unit(s) subcutaneous once a day (at bedtime) (01 Dec 2022 12:01)  NIFEdipine 30 mg oral tablet, extended release: 1 tab(s) orally once a day (07 Nov 2022 13:45)  nitroglycerin 0.4 mg sublingual spray: 1 tab(s) sublingual every 5 minutes, As Needed up to 3 tabs (14 Nov 2022 08:05)  Plavix 75 mg oral tablet: 1 tab(s) orally once a day (14 Sep 2022 17:42)  Senna 8.6 mg oral tablet: 2 tab(s) orally once a day (at bedtime) (14 Nov 2022 08:03)  sevelamer carbonate 800 mg oral tablet: 1 tab(s) orally 3 times a day (with meals) (20 Sep 2022 11:36)  tamsulosin 0.4 mg oral capsule: 1 cap(s) orally once a day (at bedtime) (07 Nov 2022 13:45)  Trulicity Pen 1.5 mg/0.5 mL subcutaneous solution: 1.5  subcutaneous once a week (14 Sep 2022 17:42)  Tylenol 325 mg oral tablet: 2 tab(s) orally every 6 hours, As Needed (14 Nov 2022 08:03)                           MEDICATIONS:  STANDING MEDICATIONS  aspirin enteric coated 81 milliGRAM(s) Oral daily  atorvastatin 40 milliGRAM(s) Oral at bedtime  cefepime   IVPB 2000 milliGRAM(s) IV Intermittent <User Schedule>  clopidogrel Tablet 75 milliGRAM(s) Oral daily  cyanocobalamin 1000 MICROGram(s) Oral daily  darbepoetin Injectable Syringe 60 MICROGram(s) IV Push <User Schedule>  dextrose 5%. 1000 milliLiter(s) IV Continuous <Continuous>  dextrose 5%. 1000 milliLiter(s) IV Continuous <Continuous>  dextrose 50% Injectable 25 Gram(s) IV Push once  dextrose 50% Injectable 12.5 Gram(s) IV Push once  dextrose 50% Injectable 25 Gram(s) IV Push once  folic acid 1 milliGRAM(s) Oral daily  furosemide    Tablet 40 milliGRAM(s) Oral daily  gentamicin   IVPB 80 milliGRAM(s) IV Intermittent <User Schedule>  glucagon  Injectable 1 milliGRAM(s) IntraMuscular once  guaifenesin/dextromethorphan Oral Liquid 5 milliLiter(s) Oral three times a day  heparin   Injectable 5000 Unit(s) SubCutaneous every 12 hours  influenza   Vaccine 0.5 milliLiter(s) IntraMuscular once  insulin glargine Injectable (LANTUS) 10 Unit(s) SubCutaneous at bedtime  insulin lispro (ADMELOG) corrective regimen sliding scale   SubCutaneous three times a day before meals  insulin lispro Injectable (ADMELOG) 5 Unit(s) SubCutaneous before breakfast  insulin lispro Injectable (ADMELOG) 5 Unit(s) SubCutaneous before lunch  metoprolol tartrate 12.5 milliGRAM(s) Oral every 12 hours  NIFEdipine XL 30 milliGRAM(s) Oral daily  pantoprazole    Tablet 40 milliGRAM(s) Oral before breakfast  polyethylene glycol 3350 17 Gram(s) Oral daily  regadenoson Injectable 0.4 milliGRAM(s) IV Push once  senna 2 Tablet(s) Oral at bedtime  sevelamer carbonate 800 milliGRAM(s) Oral three times a day with meals  tamsulosin 0.4 milliGRAM(s) Oral at bedtime    PRN MEDICATIONS  acetaminophen     Tablet .. 650 milliGRAM(s) Oral every 6 hours PRN  artificial  tears Solution 3 Drop(s) Both EYES every 2 hours PRN  dextrose Oral Gel 15 Gram(s) Oral once PRN  melatonin 3 milliGRAM(s) Oral at bedtime PRN                                            ------------------------------------------------------------    12-05-22 @ 07:01  -  12-06-22 @ 07:00  --------------------------------------------------------  IN: 0 mL / OUT: 3000 mL / NET: -3000 mL                                                 --------------------------------------------------------------  LABS:                        7.4    5.72  )-----------( 208      ( 06 Dec 2022 06:07 )             22.2     12-06    146  |  105  |  42<H>  ----------------------------<  89  5.0   |  27  |  5.0<HH>    Ca    8.4      06 Dec 2022 06:07  Phos  3.6     12-06  Mg     2.0     12-06    TPro  6.8  /  Alb  3.4<L>  /  TBili  0.2  /  DBili  <0.2  /  AST  5   /  ALT  <5  /  AlkPhos  140<H>  12-06    PT/INR - ( 06 Dec 2022 06:07 )   PT: 14.10 sec;   INR: 1.23 ratio                                                  -------------------------------------------------------------  RADIOLOGY:                                            --------------------------------------------------------------  VITAL SIGNS: Last 24 Hours  T(C): 36.1 (06 Dec 2022 07:00), Max: 36.1 (06 Dec 2022 07:00)  T(F): 97 (06 Dec 2022 07:00), Max: 97 (06 Dec 2022 07:00)  HR: 96 (06 Dec 2022 07:00) (82 - 96)  BP: 136/89 (06 Dec 2022 07:00) (136/89 - 156/74)  BP(mean): --  RR: 18 (06 Dec 2022 07:00) (18 - 18)  SpO2: --    PHYSICAL EXAM:  General: Awake, oriented and resting comfortably, no acute distress  Head: normocephalic, atraumatic  ENT:  moist mucous membranes  Cardiac: regular rate and rhythm, normal S1 and S2, no murmurs, rubs or gallops  Respiratory: clear to auscultation bilaterally, no wheezes, crackles or rhonchi, unlabored respirations  Abdomen: normoactive bowel sounds x4, non tender, nondistended  Ext:  No edema  Neuro: A&O x3, no focal neurological deficits                                                --------------------------------------------------------------    ASSESSMENT & PLAN    Past medical history and hospital course     63 yo male, h/o type 2 DM on insulin, CAD s.p stent 2008, s/p CABG 2012 (Dr Smith), PAD s/p angioplasty and stent b/l LE, BPH, ESRD on HD through left AVF (MWF follows with Dr MARY Paula), DFU sp left toe amputation, left calcaneal OM on daptomycin and cefepime post dialysis s.p wound Vac, chronic anemia, HFmrEF, presented to the hospital for chest pain.     #ACS/CAD patient presenting with Chest pain-resolved   #CAD s/p PCI/CABG  #PAD s/p angioplasty  #HTN/HLD  #Chronic HFmrEF  - Troponin 0.70 in setting of ESRD, 0.25 back in 09/2022  - Troponin stable between 0.7-0.8  - TTE LVEF 45-50%, Grade 3 diastolic dysfunction  - Nuclear stress test: FIXED PERFUSION DEFECT INVOLVING ANTERIOR WALL OF THE LEFT VENTRICLE EXTENDING TO APEX CONSISTENT WITH SCAR. GLOBAL HYPOKINESIS WITH POOR THICKENING OF THE ANTERIOR WALL. EF  36 %   - c/w ASA 81 mg daily  - c/w Plavix 75 mg daily  - c/w atorvastatin 40 mg daily  - c/w Nifedipine 30 mg daily  - EKG: Left anterior fascicular block ST & T wave abnormality, consider lateral ischemia. Prolonged QT    #Recent hx Left calcaneal OM   - Wound Cx 9/16 - Enterobacter cloacae  - wound CX 10/16 VRE Faecium Enterobacter cloacae complex, Proteus vulgaris, Morganella morganii   - s/p debridement 10/21 Wound Cx Proteus mirabilis, VRE faecium, Pseudomonas putida  - s/p debridement 10/24 Crumble L calcaneous   - s/p debridement 11/3 with wound vac in place  - Cefepime 2g post HD through 11/30; s/p 3 session of daptomycin post-HD  - On last admission, ID plan for 4 weeks from last debridement (11/3-11/30). Antibiotic course completed. ID was contacted again today 12/02/22 regarding the need of antibiotics on discharge. ID recommends to wait for sensitivity reports for proteus bacteria and ESR/CRP  - Debridement cancelled again-rescheduled for 11/29/30---performed. No further podiatry recommendations needed   -ID recommendations 12/03/22>>> as ESBL growing, can switch to Gentamicin 160 mg x 1 for load, followed by 80 mg (1 mg/kg Adjusted BW) post HD. plan at least  4 weeks from debridement given evidence of calcaneal infection in OR. will need Gentamicin level drawn pre HD to ensure levels < 2. Weekly CBC, CMP, ESR/CRP. ID follow-up with Dr. Gomez Mathur for Telehealth. We will call the patient between 10:30-1:30 [address] [phone]     #Covid+  -pt tested + for covid on 11/26   -started on remdesavir  -fu chest xray  -isolation protocol    #NSVT  -11/16 PM 10 beats of NSVT observed on tele   -Started Metoprolol 12.5 BID  -Maintain K>4 and Mg>2    # Anemia of chronic disease  - hb on admission 6.6 baseline 7.6. s/p 1U PRBC  -11/21 pt got 1 unit pRBCs   - Monitor CBC, transfuse to keep >8  -maintain active T&S     # ESRD on HD   - HD per nephrology  - B/L pitting edema, little urine output       # PAD  - s/p angiogram LLE with peroneal artery angioplasty and arterectomy 10/27  - Cont meds as above    # Type 2 DM  -Continue insulin regimen    # BPH  - c/w Tamsulosin 0.4 mg daily      -DVT prophylaxis: Heparin  -GI prophylaxis: Not indicated  -Diet: Renal, 1.2l fluid restriction  -Code status: Full code      #Pending>>> discharge    # Handoff

## 2022-12-06 NOTE — PROGRESS NOTE ADULT - SUBJECTIVE AND OBJECTIVE BOX
SCHWABACHER, LAWRENCE  62y  [facility] [id number]      Patient is a 62y old  Male who presents with a chief complaint of Chest pain (30 Nov 2022 13:29)      INTERVAL HPI/OVERNIGHT EVENTS:  Patient feels well. no overnight events   Still with eye itching didn't get the eye drops  reporting constipation            FAMILY HISTORY:  Family history of heart disease (Father)    DM (diabetes mellitus) (Mother)    ESRD (end stage renal disease) on dialysis (Mother)      T(C): 36.2 (11-30-22 @ 15:35), Max: 36.4 (11-29-22 @ 19:19)  HR: 88 (11-30-22 @ 15:35) (71 - 98)  BP: 133/60 (11-30-22 @ 15:35) (123/64 - 163/83)  RR: 20 (11-30-22 @ 15:35) (16 - 22)  SpO2: 95% (11-29-22 @ 20:00) (95% - 100%)  Wt(kg): --Vital Signs Last 24 Hrs  T(C): 36.2 (30 Nov 2022 15:35), Max: 36.4 (29 Nov 2022 19:19)  T(F): 97.2 (30 Nov 2022 15:35), Max: 97.5 (29 Nov 2022 19:19)  HR: 88 (30 Nov 2022 15:35) (71 - 98)  BP: 133/60 (30 Nov 2022 15:35) (123/64 - 163/83)  BP(mean): 104 (29 Nov 2022 17:20) (104 - 104)  RR: 20 (30 Nov 2022 15:35) (16 - 22)  SpO2: 95% (29 Nov 2022 20:00) (95% - 100%)    Parameters below as of 30 Nov 2022 11:15  Patient On (Oxygen Delivery Method): room air        PHYSICAL EXAM:  GENERAL: NAD, well-groomed, well-developed  NERVOUS SYSTEM:  Alert & Oriented X3, Good concentration;  PULM: Clear to auscultation bilaterally  CARDIAC: Regular rate and rhythm  GI: Soft, Nontender, Nondistended; Bowel sounds present  EXTREMITIES:  left foot wrapper     Consultant(s) Notes Reviewed:  [x ] YES  [ ] NO  Care Discussed with Consultants/Other Providers [ x] YES  [ ] NO    LABS:                            7.3    4.70  )-----------( 180      ( 30 Nov 2022 09:36 )             21.4   11-30    137  |  98  |  43<H>  ----------------------------<  154<H>  4.4   |  26  |  4.6<HH>    Ca    8.0<L>      30 Nov 2022 09:36  Mg     1.9     11-30    TPro  6.9  /  Alb  3.4<L>  /  TBili  0.2  /  DBili  <0.2  /  AST  6   /  ALT  <5  /  AlkPhos  141<H>  11-30            acetaminophen     Tablet .. 650 milliGRAM(s) Oral every 6 hours PRN  aspirin enteric coated 81 milliGRAM(s) Oral daily  atorvastatin 40 milliGRAM(s) Oral at bedtime  cefepime   IVPB 2000 milliGRAM(s) IV Intermittent <User Schedule>  clopidogrel Tablet 75 milliGRAM(s) Oral daily  cyanocobalamin 1000 MICROGram(s) Oral daily  darbepoetin Injectable Syringe 60 MICROGram(s) IV Push <User Schedule>  dextrose 5%. 1000 milliLiter(s) IV Continuous <Continuous>  dextrose 5%. 1000 milliLiter(s) IV Continuous <Continuous>  dextrose 50% Injectable 25 Gram(s) IV Push once  dextrose 50% Injectable 12.5 Gram(s) IV Push once  dextrose 50% Injectable 25 Gram(s) IV Push once  dextrose Oral Gel 15 Gram(s) Oral once PRN  famotidine    Tablet 20 milliGRAM(s) Oral daily  folic acid 1 milliGRAM(s) Oral daily  furosemide    Tablet 40 milliGRAM(s) Oral daily  glucagon  Injectable 1 milliGRAM(s) IntraMuscular once  guaifenesin/dextromethorphan Oral Liquid 5 milliLiter(s) Oral three times a day  heparin   Injectable 5000 Unit(s) SubCutaneous every 8 hours  influenza   Vaccine 0.5 milliLiter(s) IntraMuscular once  insulin glargine Injectable (LANTUS) 7 Unit(s) SubCutaneous at bedtime  insulin glargine Injectable (LANTUS) 13 Unit(s) SubCutaneous at bedtime  insulin lispro (ADMELOG) corrective regimen sliding scale   SubCutaneous three times a day before meals  insulin lispro Injectable (ADMELOG) 5 Unit(s) SubCutaneous before breakfast  insulin lispro Injectable (ADMELOG) 5 Unit(s) SubCutaneous before lunch  melatonin 3 milliGRAM(s) Oral at bedtime PRN  metoprolol tartrate 12.5 milliGRAM(s) Oral every 12 hours  NIFEdipine XL 30 milliGRAM(s) Oral daily  regadenoson Injectable 0.4 milliGRAM(s) IV Push once  remdesivir  IVPB 100 milliGRAM(s) IV Intermittent every 24 hours  remdesivir  IVPB   IV Intermittent   senna 2 Tablet(s) Oral at bedtime  sevelamer carbonate 800 milliGRAM(s) Oral three times a day with meals  tamsulosin 0.4 milliGRAM(s) Oral at bedtime    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 and cefepime post dialysis s.p wound Vac, chronic anemia, HFmrEF, presented to the hospital for chest pain     1. Chest pain. hx of CAD s/p PCI/CABG and PAD s/p angioplasty  ACS/CAD presenting with Chest pain  - Admit to medicine  - Cont aspirin and plavix   - Cont statin 40mg HS   - Cont Nifedipine 30mg po daily   - Troponin stable between 0.7-0.8  NST: FIXED PERFUSION DEFECT INVOLVING ANTERIOR WALL OF THE LEFT VENTRICLE EXTENDING TO APEX CONSISTENT WITH SCAR. GLOBAL HYPOKINESIS WITH POOR THICKENING OF THE ANTERIOR WALL. EF  36 %       2. NSVT  -10 beats of NSVT observed on tele on 11/16 PM  - Cont Metoprolol 12.5mg   -Maintain K>4 and Mg>2    3. Anemia of chronic disease s/p multiple PRBC  - Monitor CBC, transfuse to keep >8    4.  ESRD on HD   - HD per nephrology  - B/L pitting edema, little urine output    5. Recent hx Left calcaneal OM   - Wound Cx 9/16 - Enterobacter cloacae  - wound CX 10/16 VRE Faecium Enterobacter cloacae complex, Proteus vulgaris, Morganella morganii   - last wound cx 11/29/2022:E.coli and proteus mirabilis   - s/p debridement 10/21 Wound Cx Proteus mirabilis, VRE faecium, Pseudomonas putida  - s/p debridement 10/24 Crumble L calcaneous   - s/p debridement 11/3 with wound vac in place  - Cefepime 2g post HD through 11/30; s/p 3 session of daptomycin post-HD  - On last admission, ID plan for 4 weeks from last debridement (11/3-11/30)  -Debridement done on 11/29/2022.   - ID consult:  a) Need 4 weeks of abs from last debridement. Dc on gentamicin 160mg post HD*1 followed by maintenance 70mg post hd for 4 weeks    * need to send weekly level for nephrology to adjust gentamicin dose (it was changed from 80mg to 70mg)   - Podiatry consult appreciated     6.  PAD  - s/p angiogram LLE with peroneal artery angioplasty and arterectomy 10/27  - Cont meds as above    7. Type 2 DM  - Adjusted insulin due to hypoglycemia in am  - Lantus dose was decreased to 10u     8.  BPH  - c/w Tamsulosin 0.4 mg daily    9. COVID 19 asymptomatic   - Finished a 3 day course of remdisivir     10. Eye itching  - Tear drops     11. Constipation  - Cont laxative     Pending: Podiatry procedure  -DVT prophylaxis: Heparin  -GI prophylaxis: Not indicated  -Diet: Renal, 1.2l fluid restriction  -Code status: Full code      Patient feels well. he's agreeable with dc awaiting be at STR     Case Discussed with House Staff   20 minutes spent on total encounter; more than 50% of the visit was spent counseling and/or coordinating care by the attending physician.   Spectra [phone]

## 2022-12-06 NOTE — PROGRESS NOTE ADULT - SUBJECTIVE AND OBJECTIVE BOX
Nephrology progress note    Patient is seen and examined, events over the last 24 h noted .    Allergies:  No Known Allergies    Hospital Medications:   MEDICATIONS  (STANDING):  aspirin enteric coated 81 milliGRAM(s) Oral daily  atorvastatin 40 milliGRAM(s) Oral at bedtime  cefepime   IVPB 2000 milliGRAM(s) IV Intermittent <User Schedule>  clopidogrel Tablet 75 milliGRAM(s) Oral daily  cyanocobalamin 1000 MICROGram(s) Oral daily  darbepoetin Injectable Syringe 60 MICROGram(s) IV Push <User Schedule>  dextrose 5%. 1000 milliLiter(s) (100 mL/Hr) IV Continuous <Continuous>  dextrose 5%. 1000 milliLiter(s) (50 mL/Hr) IV Continuous <Continuous>  dextrose 50% Injectable 25 Gram(s) IV Push once  dextrose 50% Injectable 12.5 Gram(s) IV Push once  dextrose 50% Injectable 25 Gram(s) IV Push once  folic acid 1 milliGRAM(s) Oral daily  furosemide    Tablet 40 milliGRAM(s) Oral daily  gentamicin   IVPB 80 milliGRAM(s) IV Intermittent <User Schedule>  glucagon  Injectable 1 milliGRAM(s) IntraMuscular once  guaifenesin/dextromethorphan Oral Liquid 5 milliLiter(s) Oral three times a day  heparin   Injectable 5000 Unit(s) SubCutaneous every 12 hours  influenza   Vaccine 0.5 milliLiter(s) IntraMuscular once  insulin glargine Injectable (LANTUS) 10 Unit(s) SubCutaneous at bedtime  insulin lispro (ADMELOG) corrective regimen sliding scale   SubCutaneous three times a day before meals  insulin lispro Injectable (ADMELOG) 5 Unit(s) SubCutaneous before breakfast  insulin lispro Injectable (ADMELOG) 5 Unit(s) SubCutaneous before lunch  metoprolol tartrate 12.5 milliGRAM(s) Oral every 12 hours  NIFEdipine XL 30 milliGRAM(s) Oral daily  pantoprazole    Tablet 40 milliGRAM(s) Oral before breakfast  polyethylene glycol 3350 17 Gram(s) Oral daily  regadenoson Injectable 0.4 milliGRAM(s) IV Push once  senna 2 Tablet(s) Oral at bedtime  sevelamer carbonate 800 milliGRAM(s) Oral three times a day with meals  tamsulosin 0.4 milliGRAM(s) Oral at bedtime        VITALS:  T(F): 97 (12-06-22 @ 07:00), Max: 97 (12-06-22 @ 07:00)  HR: 96 (12-06-22 @ 07:00)  BP: 136/89 (12-06-22 @ 07:00)  RR: 18 (12-06-22 @ 07:00)  SpO2: --  Wt(kg): --    12-04 @ 07:01  -  12-05 @ 07:00  --------------------------------------------------------  IN: 360 mL / OUT: 0 mL / NET: 360 mL    12-05 @ 07:01  -  12-06 @ 07:00  --------------------------------------------------------  IN: 0 mL / OUT: 3000 mL / NET: -3000 mL          PHYSICAL EXAM:  Constitutional: NAD  HEENT: anicteric sclera,   Neck: No JVD  Respiratory: CTA  Cardiovascular: S1, S2, RRR  Gastrointestinal: BS+, soft, NT/ND  Extremities: No peripheral edema  Neurological: A/O x 3  : No CVA tenderness. No schneider.   Skin: No rashes  Vascular Access: AVF    LABS:  12-06    146  |  105  |  42<H>  ----------------------------<  89  5.0   |  27  |  5.0<HH>    Ca    8.4      06 Dec 2022 06:07  Phos  3.6     12-06  Mg     2.0     12-06    TPro  6.8  /  Alb  3.4<L>  /  TBili  0.2  /  DBili  <0.2  /  AST  5   /  ALT  <5  /  AlkPhos  140<H>  12-06                          7.4    5.72  )-----------( 208      ( 06 Dec 2022 06:07 )             22.2       Urine Studies:      RADIOLOGY & ADDITIONAL STUDIES:

## 2022-12-07 LAB
ALBUMIN SERPL ELPH-MCNC: 3.5 G/DL — SIGNIFICANT CHANGE UP (ref 3.5–5.2)
ALBUMIN SERPL ELPH-MCNC: 3.6 G/DL — SIGNIFICANT CHANGE UP (ref 3.5–5.2)
ALP SERPL-CCNC: 143 U/L — HIGH (ref 30–115)
ALP SERPL-CCNC: 144 U/L — HIGH (ref 30–115)
ALT FLD-CCNC: <5 U/L — SIGNIFICANT CHANGE UP (ref 0–41)
ALT FLD-CCNC: <5 U/L — SIGNIFICANT CHANGE UP (ref 0–41)
ANION GAP SERPL CALC-SCNC: 15 MMOL/L — HIGH (ref 7–14)
AST SERPL-CCNC: 5 U/L — SIGNIFICANT CHANGE UP (ref 0–41)
AST SERPL-CCNC: 6 U/L — SIGNIFICANT CHANGE UP (ref 0–41)
BASOPHILS # BLD AUTO: 0.09 K/UL — SIGNIFICANT CHANGE UP (ref 0–0.2)
BASOPHILS NFR BLD AUTO: 1.7 % — HIGH (ref 0–1)
BILIRUB DIRECT SERPL-MCNC: <0.2 MG/DL — SIGNIFICANT CHANGE UP (ref 0–0.3)
BILIRUB INDIRECT FLD-MCNC: >0.1 MG/DL — LOW (ref 0.2–1.2)
BILIRUB SERPL-MCNC: 0.3 MG/DL — SIGNIFICANT CHANGE UP (ref 0.2–1.2)
BILIRUB SERPL-MCNC: 0.3 MG/DL — SIGNIFICANT CHANGE UP (ref 0.2–1.2)
BUN SERPL-MCNC: 54 MG/DL — HIGH (ref 10–20)
CALCIUM SERPL-MCNC: 8.6 MG/DL — SIGNIFICANT CHANGE UP (ref 8.4–10.5)
CHLORIDE SERPL-SCNC: 99 MMOL/L — SIGNIFICANT CHANGE UP (ref 98–110)
CO2 SERPL-SCNC: 24 MMOL/L — SIGNIFICANT CHANGE UP (ref 17–32)
CREAT SERPL-MCNC: 6.2 MG/DL — CRITICAL HIGH (ref 0.7–1.5)
EGFR: 9 ML/MIN/1.73M2 — LOW
EOSINOPHIL # BLD AUTO: 0.17 K/UL — SIGNIFICANT CHANGE UP (ref 0–0.7)
EOSINOPHIL NFR BLD AUTO: 3.2 % — SIGNIFICANT CHANGE UP (ref 0–8)
GLUCOSE BLDC GLUCOMTR-MCNC: 157 MG/DL — HIGH (ref 70–99)
GLUCOSE BLDC GLUCOMTR-MCNC: 178 MG/DL — HIGH (ref 70–99)
GLUCOSE BLDC GLUCOMTR-MCNC: 181 MG/DL — HIGH (ref 70–99)
GLUCOSE BLDC GLUCOMTR-MCNC: 78 MG/DL — SIGNIFICANT CHANGE UP (ref 70–99)
GLUCOSE SERPL-MCNC: 73 MG/DL — SIGNIFICANT CHANGE UP (ref 70–99)
HCT VFR BLD CALC: 22.1 % — LOW (ref 42–52)
HGB BLD-MCNC: 7.1 G/DL — LOW (ref 14–18)
IMM GRANULOCYTES NFR BLD AUTO: 0.4 % — HIGH (ref 0.1–0.3)
INR BLD: 1.2 RATIO — SIGNIFICANT CHANGE UP (ref 0.65–1.3)
LYMPHOCYTES # BLD AUTO: 0.89 K/UL — LOW (ref 1.2–3.4)
LYMPHOCYTES # BLD AUTO: 16.7 % — LOW (ref 20.5–51.1)
MAGNESIUM SERPL-MCNC: 2 MG/DL — SIGNIFICANT CHANGE UP (ref 1.8–2.4)
MCHC RBC-ENTMCNC: 29 PG — SIGNIFICANT CHANGE UP (ref 27–31)
MCHC RBC-ENTMCNC: 32.1 G/DL — SIGNIFICANT CHANGE UP (ref 32–37)
MCV RBC AUTO: 90.2 FL — SIGNIFICANT CHANGE UP (ref 80–94)
MONOCYTES # BLD AUTO: 0.46 K/UL — SIGNIFICANT CHANGE UP (ref 0.1–0.6)
MONOCYTES NFR BLD AUTO: 8.6 % — SIGNIFICANT CHANGE UP (ref 1.7–9.3)
NEUTROPHILS # BLD AUTO: 3.7 K/UL — SIGNIFICANT CHANGE UP (ref 1.4–6.5)
NEUTROPHILS NFR BLD AUTO: 69.4 % — SIGNIFICANT CHANGE UP (ref 42.2–75.2)
NRBC # BLD: 0 /100 WBCS — SIGNIFICANT CHANGE UP (ref 0–0)
PHOSPHATE SERPL-MCNC: 4 MG/DL — SIGNIFICANT CHANGE UP (ref 2.1–4.9)
PLATELET # BLD AUTO: 208 K/UL — SIGNIFICANT CHANGE UP (ref 130–400)
POTASSIUM SERPL-MCNC: 5.1 MMOL/L — HIGH (ref 3.5–5)
POTASSIUM SERPL-SCNC: 5.1 MMOL/L — HIGH (ref 3.5–5)
PROT SERPL-MCNC: 7.2 G/DL — SIGNIFICANT CHANGE UP (ref 6–8)
PROT SERPL-MCNC: 7.2 G/DL — SIGNIFICANT CHANGE UP (ref 6–8)
PROTHROM AB SERPL-ACNC: 13.7 SEC — HIGH (ref 9.95–12.87)
RBC # BLD: 2.45 M/UL — LOW (ref 4.7–6.1)
RBC # FLD: 16.1 % — HIGH (ref 11.5–14.5)
SODIUM SERPL-SCNC: 138 MMOL/L — SIGNIFICANT CHANGE UP (ref 135–146)
WBC # BLD: 5.33 K/UL — SIGNIFICANT CHANGE UP (ref 4.8–10.8)
WBC # FLD AUTO: 5.33 K/UL — SIGNIFICANT CHANGE UP (ref 4.8–10.8)

## 2022-12-07 PROCEDURE — 99233 SBSQ HOSP IP/OBS HIGH 50: CPT

## 2022-12-07 PROCEDURE — 99232 SBSQ HOSP IP/OBS MODERATE 35: CPT | Mod: 24

## 2022-12-07 RX ORDER — GENTAMICIN SULFATE 40 MG/ML
70 VIAL (ML) INJECTION
Refills: 0 | Status: DISCONTINUED | OUTPATIENT
Start: 2022-12-07 | End: 2022-12-08

## 2022-12-07 RX ADMIN — SEVELAMER CARBONATE 800 MILLIGRAM(S): 2400 POWDER, FOR SUSPENSION ORAL at 08:01

## 2022-12-07 RX ADMIN — SEVELAMER CARBONATE 800 MILLIGRAM(S): 2400 POWDER, FOR SUSPENSION ORAL at 12:03

## 2022-12-07 RX ADMIN — Medication 30 MILLIGRAM(S): at 06:39

## 2022-12-07 RX ADMIN — PANTOPRAZOLE SODIUM 40 MILLIGRAM(S): 20 TABLET, DELAYED RELEASE ORAL at 06:39

## 2022-12-07 RX ADMIN — PREGABALIN 1000 MICROGRAM(S): 225 CAPSULE ORAL at 12:03

## 2022-12-07 RX ADMIN — Medication 1 MILLIGRAM(S): at 12:03

## 2022-12-07 RX ADMIN — Medication 5 MILLILITER(S): at 06:39

## 2022-12-07 RX ADMIN — HEPARIN SODIUM 5000 UNIT(S): 5000 INJECTION INTRAVENOUS; SUBCUTANEOUS at 06:38

## 2022-12-07 RX ADMIN — Medication 12.5 MILLIGRAM(S): at 17:21

## 2022-12-07 RX ADMIN — ATORVASTATIN CALCIUM 40 MILLIGRAM(S): 80 TABLET, FILM COATED ORAL at 22:10

## 2022-12-07 RX ADMIN — CEFEPIME 100 MILLIGRAM(S): 1 INJECTION, POWDER, FOR SOLUTION INTRAMUSCULAR; INTRAVENOUS at 11:22

## 2022-12-07 RX ADMIN — TAMSULOSIN HYDROCHLORIDE 0.4 MILLIGRAM(S): 0.4 CAPSULE ORAL at 22:11

## 2022-12-07 RX ADMIN — Medication 5 UNIT(S): at 12:43

## 2022-12-07 RX ADMIN — Medication 12.5 MILLIGRAM(S): at 06:39

## 2022-12-07 RX ADMIN — Medication 81 MILLIGRAM(S): at 12:03

## 2022-12-07 RX ADMIN — HEPARIN SODIUM 5000 UNIT(S): 5000 INJECTION INTRAVENOUS; SUBCUTANEOUS at 17:19

## 2022-12-07 RX ADMIN — INSULIN GLARGINE 10 UNIT(S): 100 INJECTION, SOLUTION SUBCUTANEOUS at 22:10

## 2022-12-07 RX ADMIN — CLOPIDOGREL BISULFATE 75 MILLIGRAM(S): 75 TABLET, FILM COATED ORAL at 12:03

## 2022-12-07 RX ADMIN — Medication 40 MILLIGRAM(S): at 06:38

## 2022-12-07 RX ADMIN — Medication 1: at 12:42

## 2022-12-07 RX ADMIN — Medication 5 MILLILITER(S): at 14:23

## 2022-12-07 RX ADMIN — Medication 103.5 MILLIGRAM(S): at 11:23

## 2022-12-07 RX ADMIN — SENNA PLUS 2 TABLET(S): 8.6 TABLET ORAL at 22:11

## 2022-12-07 RX ADMIN — POLYETHYLENE GLYCOL 3350 17 GRAM(S): 17 POWDER, FOR SOLUTION ORAL at 12:04

## 2022-12-07 RX ADMIN — SEVELAMER CARBONATE 800 MILLIGRAM(S): 2400 POWDER, FOR SUSPENSION ORAL at 17:20

## 2022-12-07 RX ADMIN — Medication 1: at 17:17

## 2022-12-07 NOTE — PROGRESS NOTE ADULT - SUBJECTIVE AND OBJECTIVE BOX
SCHWABACHER, LAWRENCE  62y, Male  Allergy: No Known Allergies      LOS  23d    CHIEF COMPLAINT: Chest pain (07 Dec 2022 08:32)      INTERVAL EVENTS/HPI  - No acute events overnight  - T(F): , Max: 97.9 (12-07-22 @ 08:04)  - Denies any worsening symptoms  - Tolerating medication  - WBC Count: 5.33 (12-07-22 @ 08:15)  WBC Count: 5.72 (12-06-22 @ 06:07)     - Creatinine, Serum: 5.0 (12-06-22 @ 06:07)       ROS  General: Denies rigors, nightsweats  HEENT: Denies headache, rhinorrhea, sore throat, eye pain  CV: Denies CP, palpitations  PULM: Denies wheezing, hemoptysis  GI: Denies hematemesis, hematochezia, melena  : Denies discharge, hematuria  MSK: Denies arthralgias, myalgias  SKIN: Denies rash, lesions  NEURO: Denies paresthesias, weakness  PSYCH: Denies depression, anxiety    VITALS:  T(F): 97.9, Max: 97.9 (12-07-22 @ 08:04)  HR: 76  BP: 138/72  RR: 19Vital Signs Last 24 Hrs  T(C): 36.6 (07 Dec 2022 08:04), Max: 36.6 (07 Dec 2022 08:04)  T(F): 97.9 (07 Dec 2022 08:04), Max: 97.9 (07 Dec 2022 08:04)  HR: 76 (07 Dec 2022 08:23) (76 - 92)  BP: 138/72 (07 Dec 2022 08:23) (129/57 - 149/70)  BP(mean): --  RR: 19 (07 Dec 2022 08:23) (18 - 19)  SpO2: --    Parameters below as of 07 Dec 2022 08:23  Patient On (Oxygen Delivery Method): room air        PHYSICAL EXAM:  Gen: NAD, resting in bed  HEENT: Normocephalic, atraumatic  Neck: supple, no lymphadenopathy  CV: Regular rate & regular rhythm  Lungs: decreased BS at bases, no fremitus  Abdomen: Soft, BS present  Ext: Warm, well perfused  Neuro: non focal, awake  Skin: no rash, no erythema  Lines: no phlebitis    FH: Non-contributory  Social Hx: Non-contributory    TESTS & MEASUREMENTS:                        7.1    5.33  )-----------( 208      ( 07 Dec 2022 08:15 )             22.1     12-06    146  |  105  |  42<H>  ----------------------------<  89  5.0   |  27  |  5.0<HH>    Ca    8.4      06 Dec 2022 06:07  Phos  3.6     12-06  Mg     2.0     12-06    TPro  6.8  /  Alb  3.4<L>  /  TBili  0.2  /  DBili  <0.2  /  AST  5   /  ALT  <5  /  AlkPhos  140<H>  12-06      LIVER FUNCTIONS - ( 06 Dec 2022 06:07 )  Alb: 3.4 g/dL / Pro: 6.8 g/dL / ALK PHOS: 140 U/L / ALT: <5 U/L / AST: 5 U/L / GGT: x               Culture - Other (collected 11-29-22 @ 22:03)  Source: .Other None  Final Report (12-02-22 @ 16:54):    Moderate Escherichia coli ESBL    Normal skin sirisha isolated  Organism: Escherichia coli ESBL (12-02-22 @ 16:54)  Organism: Escherichia coli ESBL (12-02-22 @ 16:54)      -  Amikacin: S <=16      -  Amoxicillin/Clavulanic Acid: I 16/8      -  Ampicillin: R >16 These ampicillin results predict results for amoxicillin      -  Ampicillin/Sulbactam: R >16/8 Enterobacter, Klebsiella aerogenes, Citrobacter, and Serratia may develop resistance during prolonged therapy (3-4 days)      -  Aztreonam: R >16      -  Cefazolin: R >16 Enterobacter, Klebsiella aerogenes, Citrobacter, and Serratia may develop resistance during prolonged therapy (3-4 days)      -  Cefepime: R >16      -  Ceftriaxone: R >32 Enterobacter, Klebsiella aerogenes, Citrobacter, and Serratia may develop resistance during prolonged therapy      -  Ciprofloxacin: R >2      -  Ertapenem: S <=0.5      -  Gentamicin: S <=2      -  Imipenem: S <=1      -  Levofloxacin: R >4      -  Meropenem: S <=1      -  Piperacillin/Tazobactam: R <=8      -  Tobramycin: S <=2      -  Trimethoprim/Sulfamethoxazole: S <=0.5/9.5      Method Type: CONNER    Culture - Other (collected 11-29-22 @ 22:00)  Source: .Other None  Final Report (12-02-22 @ 16:12):    Numerous Escherichia coli ESBL    Rare Proteus vulgaris group    Normal skin sirisha isolated  Organism: Escherichia coli ESBL  Proteus vulgaris group (12-02-22 @ 16:12)  Organism: Proteus vulgaris group (12-02-22 @ 16:12)      -  Amikacin: S <=16      -  Amoxicillin/Clavulanic Acid: S <=8/4      -  Ampicillin: R >16 These ampicillin results predict results for amoxicillin      -  Ampicillin/Sulbactam: S <=4/2 Enterobacter, Klebsiella aerogenes, Citrobacter, and Serratia may develop resistance during prolonged therapy (3-4 days)      -  Aztreonam: S <=4      -  Cefazolin: R 8 Enterobacter, Klebsiella aerogenes, Citrobacter, and Serratia may develop resistance during prolonged therapy (3-4 days)      -  Cefepime: S <=2      -  Cefoxitin: S <=8      -  Ceftriaxone: S <=1 Enterobacter, Klebsiella aerogenes, Citrobacter, and Serratia may develop resistance during prolonged therapy      -  Ciprofloxacin: S <=0.25      -  Ertapenem: S <=0.5      -  Gentamicin: S <=2      -  Levofloxacin: S <=0.5      -  Meropenem: S <=1      -  Piperacillin/Tazobactam: S <=8      -  Tobramycin: S <=2      -  Trimethoprim/Sulfamethoxazole: S <=0.5/9.5      Method Type: CONNER  Organism: Escherichia coli ESBL (12-02-22 @ 16:12)      -  Amikacin: S <=16      -  Amoxicillin/Clavulanic Acid: I 16/8      -  Ampicillin: R >16 These ampicillin results predict results for amoxicillin      -  Ampicillin/Sulbactam: R >16/8 Enterobacter, Klebsiella aerogenes, Citrobacter, and Serratia may develop resistance during prolonged therapy (3-4 days)      -  Aztreonam: R >16      -  Cefazolin: R >16 Enterobacter, Klebsiella aerogenes, Citrobacter, and Serratia may develop resistance during prolonged therapy (3-4 days)      -  Cefepime: R >16      -  Ceftriaxone: R >32 Enterobacter, Klebsiella aerogenes, Citrobacter, and Serratia may develop resistance during prolonged therapy      -  Ciprofloxacin: R >2      -  Ertapenem: S <=0.5      -  Gentamicin: S <=2      -  Imipenem: S <=1      -  Levofloxacin: R >4      -  Meropenem: S <=1      -  Piperacillin/Tazobactam: R 32      -  Tobramycin: S <=2      -  Trimethoprim/Sulfamethoxazole: S 1/19      Method Type: [facility]            INFECTIOUS DISEASES TESTING  COVID-19 PCR: Detected (11-27-22 @ 13:50)  COVID-19 PCR: Detected (11-25-22 @ 17:41)  COVID-19 PCR: NotDetec (11-24-22 @ 04:25)  COVID-19 PCR: NotDetec (11-13-22 @ 21:56)  COVID-19 PCR: NotDetec (11-07-22 @ 14:02)  COVID-19 PCR: NotDetec (11-02-22 @ 14:52)  COVID-19 PCR: NotDetec (10-30-22 @ 12:40)  COVID-19 PCR: NotDetec (10-26-22 @ 13:06)  COVID-19 PCR: NotDetec (10-17-22 @ 20:19)  COVID-19 PCR: Detected (09-20-22 @ 12:50)  COVID-19 PCR: NotDetec (09-14-22 @ 13:45)  COVID-19 PCR: NotDetec (09-06-22 @ 01:35)  COVID-19 PCR: NotDetec (03-02-22 @ 14:15)  COVID-19 PCR: NotDetec (02-27-22 @ 15:30)  COVID-19 PCR: NotDetec (02-23-22 @ 11:50)  COVID-19 PCR: NotDetec (02-21-22 @ 10:25)      INFLAMMATORY MARKERS  Sedimentation Rate, Erythrocyte: 76 mm/Hr (12-02-22 @ 12:43)  C-Reactive Protein, Serum: 36.8 mg/L (12-02-22 @ 12:43)  Sedimentation Rate, Erythrocyte: 118 mm/Hr (11-14-22 @ 11:33)  C-Reactive Protein, Serum: 80.8 mg/L (11-14-22 @ 11:33)      RADIOLOGY & ADDITIONAL TESTS:  I have personally reviewed the last available Chest xray  CXR      CT      CARDIOLOGY TESTING      MEDICATIONS  aspirin enteric coated 81 Oral daily  atorvastatin 40 Oral at bedtime  cefepime   IVPB 2000 IV Intermittent <User Schedule>  clopidogrel Tablet 75 Oral daily  cyanocobalamin 1000 Oral daily  darbepoetin Injectable Syringe 60 IV Push <User Schedule>  dextrose 5%. 1000 IV Continuous <Continuous>  dextrose 5%. 1000 IV Continuous <Continuous>  dextrose 50% Injectable 25 IV Push once  dextrose 50% Injectable 12.5 IV Push once  dextrose 50% Injectable 25 IV Push once  folic acid 1 Oral daily  furosemide    Tablet 40 Oral daily  gentamicin   IVPB 70 IV Intermittent <User Schedule>  glucagon  Injectable 1 IntraMuscular once  guaifenesin/dextromethorphan Oral Liquid 5 Oral three times a day  heparin   Injectable 5000 SubCutaneous every 12 hours  influenza   Vaccine 0.5 IntraMuscular once  insulin glargine Injectable (LANTUS) 10 SubCutaneous at bedtime  insulin lispro (ADMELOG) corrective regimen sliding scale  SubCutaneous three times a day before meals  insulin lispro Injectable (ADMELOG) 5 SubCutaneous before breakfast  insulin lispro Injectable (ADMELOG) 5 SubCutaneous before lunch  metoprolol tartrate 12.5 Oral every 12 hours  NIFEdipine XL 30 Oral daily  pantoprazole    Tablet 40 Oral before breakfast  polyethylene glycol 3350 17 Oral daily  polyethylene glycol 3350 17 Oral once  regadenoson Injectable 0.4 IV Push once  senna 2 Oral at bedtime  sevelamer carbonate 800 Oral three times a day with meals  tamsulosin 0.4 Oral at bedtime      WEIGHT  Weight (kg): 90.8 (11-29-22 @ 17:20)      ANTIBIOTICS:  cefepime   IVPB 2000 milliGRAM(s) IV Intermittent <User Schedule>  gentamicin   IVPB 70 milliGRAM(s) IV Intermittent <User Schedule>      All available historical records have been reviewed

## 2022-12-07 NOTE — PROGRESS NOTE ADULT - ASSESSMENT
ASSESSMENT  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 and cefepime post dialysis s.p wound Vac, chronic anemia, HFmrEF, presented to the hospital for chest pain       IMPRESSION  #Chest Pain   #Left Calcaneous OM  - s/p removal of FB 9/16  - Wound Cx 9/16 - Enterobacter cloacae  - treated with two weeks post-HD vancomycin + cefepime  - wound CX 10/16 VRE Faecim, Enterobacter cloacae complex, Proteus vulgaris, Morganella morganii   - s/p debridement 10/21 Wound Cx Proteus mirabilis, VRE faecium, Pseudomonas putida  - s/p debridement 10/24 Crumble L alcaenous - granular healthy post   - s/p debridement 11/3 with wound vac in place  - discharged to complete cefepime 2g post HD and Daptomycin post HD until 11/30   - Xray Foot AP + Lateral + Oblique, Left (11.29.22 @ 21:57): As seen on MRI from 10/17/2022, there is a pathologic fracture through  the calcaneus. There is increased erosive change of the posterior plantar   aspect of the calcaneus compared to 10/15/2022 which maybe secondary to  surgical debridement, although ongoing osteomyelitis is difficult to  exclude. There is overlying skin ulceration. No deep soft tissue gas is  seen.  - s/p debridement 11/29 - infected nonviable soft tissue and bone -- wound Cx growing ESBL E coli and proteus vulgaris     Sedimentation Rate, Erythrocyte: 118 mm/Hr (11.14.22 @ 11:33) --> Sedimentation Rate, Erythrocyte: 76 mm/Hr (12.02.22 @ 12:43)  C-Reactive Protein, Serum: 36.8 mg/L (12.02.22 @ 12:43)    #PAD  - s/p angiogram LLE with peroneal artery angioplasty and artherectom 10/27    #Dysuria   - Urine Cx 10/25 NG -- collected after fluconazole     #ESBL E coli Bacteremia at [facility]     #ESRD on HD  #CAD s/p CABG  #PAD s/p bilateral angioplasty  #DM  #Abx allergy:      RECOMMENDATIONS  - stop cefepime   - Gentamicin 80 mg (1 mg/kg Adjusted BW) post HD   - plan at least  4 weeks from debridement given evidence of calcaneal infection in OR (end date 1/1/2023)  - will need Gentamicin level drawn prior to next  HD on Friday to ensure levels < 2     - Weekly CBC, CMP, ESR/CRP, pre HD levels once a week (goal < 2)  - ID follow-up with Dr. Gomez Mathur for Telehealth. We will call the patient between 10:30-1:30      [address]       [phone]     Please call or message on [employer] Teams if with any questions.  Spectra [phone]

## 2022-12-07 NOTE — PROGRESS NOTE ADULT - ASSESSMENT
63 yo male, h/o type 2 DM on insulin, CAD s.p stent 2008, s/p CABG 2012 (Dr Smith), PAD s/p angioplasty and stent b/l LE, BPH, ESRD on HD through left AVF (MWF follows with Dr MARY Paula), DFU sp left toe amputation, left calcaneal OM on daptomycin and cefepime post dialysis s.p wound Vac, chronic anemia, HFmrEF, presented to the hospital for chest pain     # Chest pain  # CAD s/p PCI/CABG   # PAD s/p angioplasty    - Cont aspirin, statin, plavix   - Cont Nifedipine 30mg po daily   - NST: FIXED PERFUSION DEFECT INVOLVING ANTERIOR WALL OF THE LEFT VENTRICLE EXTENDING TO APEX CONSISTENT WITH SCAR. GLOBAL HYPOKINESIS WITH POOR THICKENING OF THE ANTERIOR WALL. EF  36 %   - outpatient cardio follow up     # Anemia of chronic disease s/p multiple PRBC  - Monitor CBC, transfuse to keep >8  - transfused one unit in HD today     # ESRD on HD   - HD per nephrology    # Recent hx Left calcaneal OM   - Wound Cx 9/16 - Enterobacter cloacae  - wound CX 10/16 VRE Faecium Enterobacter cloacae complex, Proteus vulgaris, Morganella morganii   - last wound cx 11/29/2022:E.coli and proteus mirabilis   - s/p debridement 10/21 Wound Cx Proteus mirabilis, VRE faecium, Pseudomonas putida  - s/p debridement 10/24 Crumble L calcaneous   - s/p debridement 11/3 with wound vac in place  - Cefepime 2g post HD through 11/30; s/p 3 session of daptomycin post-HD  - On last admission, ID plan for 4 weeks from last debridement (11/3-11/30)  - Debridement done on 11/29/2022.   - ID following - Need 4 weeks of abs from last debridement. Dc on gentamicin 160mg post HD*1 followed by maintenance 70mg post hd for 4 weeks    - need to send weekly level for nephrology to adjust gentamicin dose (it was changed from 80mg to 70mg)   - Podiatry consult appreciated  - wound grew esbl- needs private room - will be available tomorrow - pt anticipated      # PAD  - s/p angiogram LLE with peroneal artery angioplasty and arterectomy 10/27  - outpatient vascular follow up   - cont current meds     # DM II   - well controlled on current regimen     # BPH  - Tamsulosin     # COVID   - Finished a 3 day course of remdisivir     Progress Note Handoff  Pending Consults: none  Pending Tests: none  Pending Results: none  Family Discussion: discussed labs, meds and d/c to SNF with pt and detail. Private room will be available tomorrow. Pt anticipated   Disposition: Home_____/SNF__x____/Other_____/Unknown at this time_____  Spent over 35 min reviewing chart and on coordinating patient care during interdisciplinary rounds

## 2022-12-07 NOTE — PROGRESS NOTE ADULT - SUBJECTIVE AND OBJECTIVE BOX
Nephrology progress note  Patient is seen and examined, events over the last 24 h noted .  Lying in bed comfortable     Allergies:  No Known Allergies    Hospital Medications:   MEDICATIONS  (STANDING):    aspirin enteric coated 81 milliGRAM(s) Oral daily  atorvastatin 40 milliGRAM(s) Oral at bedtime  cefepime   IVPB 2000 milliGRAM(s) IV Intermittent <User Schedule>  clopidogrel Tablet 75 milliGRAM(s) Oral daily  cyanocobalamin 1000 MICROGram(s) Oral daily  darbepoetin Injectable Syringe 60 MICROGram(s) IV Push <User Schedule>  folic acid 1 milliGRAM(s) Oral daily  furosemide    Tablet 40 milliGRAM(s) Oral daily  gentamicin   IVPB 80 milliGRAM(s) IV Intermittent <User Schedule>  glucagon  Injectable 1 milliGRAM(s) IntraMuscular once  guaifenesin/dextromethorphan Oral Liquid 5 milliLiter(s) Oral three times a day  heparin   Injectable 5000 Unit(s) SubCutaneous every 12 hours  influenza   Vaccine 0.5 milliLiter(s) IntraMuscular once  insulin glargine Injectable (LANTUS) 10 Unit(s) SubCutaneous at bedtime  insulin lispro (ADMELOG) corrective regimen sliding scale   SubCutaneous three times a day before meals  insulin lispro Injectable (ADMELOG) 5 Unit(s) SubCutaneous before breakfast  insulin lispro Injectable (ADMELOG) 5 Unit(s) SubCutaneous before lunch  metoprolol tartrate 12.5 milliGRAM(s) Oral every 12 hours  NIFEdipine XL 30 milliGRAM(s) Oral daily  pantoprazole    Tablet 40 milliGRAM(s) Oral before breakfast  polyethylene glycol 3350 17 Gram(s) Oral once  polyethylene glycol 3350 17 Gram(s) Oral daily  regadenoson Injectable 0.4 milliGRAM(s) IV Push once  senna 2 Tablet(s) Oral at bedtime  sevelamer carbonate 800 milliGRAM(s) Oral three times a day with meals  tamsulosin 0.4 milliGRAM(s) Oral at bedtime        VITALS:  T(F): 97.9 (12-07-22 @ 08:04), Max: 97.9 (12-07-22 @ 08:04)  HR: 85 (12-07-22 @ 08:04)  BP: 134/64 (12-07-22 @ 08:04)  RR: 18 (12-07-22 @ 08:04)      12-05 @ 07:01  -  12-06 @ 07:00  --------------------------------------------------------  IN: 0 mL / OUT: 3000 mL / NET: -3000 mL          PHYSICAL EXAM:  Constitutional: NAD  Neck: No JVD  Respiratory: CTAB,  Cardiovascular: S1, S2, RRR  Gastrointestinal: BS+, soft, NT/ND  Extremities: No cyanosis or clubbing. No peripheral edema  :  No schneider.   Skin: No rashes    LABS:  12-06    146  |  105  |  42<H>  ----------------------------<  89  5.0   |  27  |  5.0<HH>    Ca    8.4      06 Dec 2022 06:07  Phos  3.6     12-06  Mg     2.0     12-06    TPro  6.8  /  Alb  3.4<L>  /  TBili  0.2  /  DBili  <0.2  /  AST  5   /  ALT  <5  /  AlkPhos  140<H>  12-06                          7.4    5.72  )-----------( 208      ( 06 Dec 2022 06:07 )             22.2       Urine Studies:        Iron 39, TIBC 133, %sat 29      [10-21-22 @ 10:30]  Ferritin 1126      [10-21-22 @ 10:30]  PTH -- (Ca 7.5)      [02-26-22 @ 16:00]   312  Vitamin D (25OH) 21      [02-26-22 @ 16:00]  HbA1c 5.8      [01-30-20 @ 06:46]  Lipid: chol 81, , HDL 22, LDL --      [10-15-22 @ 09:53]    HBsAb <3.0      [12-29-19 @ 17:44]  HBsAb Nonreact      [11-03-22 @ 06:40]  HBsAg Nonreact      [11-03-22 @ 06:40]  HBcAb Nonreact      [11-03-22 @ 06:40]  HCV 0.14, Nonreact      [10-27-22 @ 04:09]        RADIOLOGY & ADDITIONAL STUDIES:

## 2022-12-07 NOTE — PROGRESS NOTE ADULT - ASSESSMENT
· Assessment	  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 and cefepime post dialysis s.p wound Vac, chronic anemia, HFmrEF, presented to the hospital for chest pain.    ESRD on HD/ Anemia / Chest pain/ OM on abx  HD today 3h opti 160 2k UF 3l as tolerated   RAMSEY weekly with 60 mcg-increased/ transfuse if Hb< 7/ may be resistant in the context of OM inflammation   on sevelamer 800 mg po tid/ phos at goal   on lasix non oliguric    BP controlled  s/p debridement with podiatry 11/21 and 11/29 has calcaneal OM - as per ID : Gentamicin 160 mg x 1 for load, followed by 80 mg (1 mg/kg Adjusted BW) post HD / plan at least  4 weeks from debridement given evidence of calcaneal infection in OR /  PLEASE REDUCE Gentamicin to 70 mg post HD - level drawn pre HD on 12/5 was 2.7   please draw Genta trough level pre-HD in the [facility] to be reported to the pt's Nephrologist (not medical doctor) to adjust the dose to ensure levels < 2   will follow

## 2022-12-07 NOTE — PROGRESS NOTE ADULT - SUBJECTIVE AND OBJECTIVE BOX
Podiatry Progress Note    Subjective:   SCHWABACHER, LAWRENCE is a pleasant well-nourished, well developed 62y Male in no acute distress, alert awake, and oriented to person, place and time.  Patient is a 62y old  Male who presents with a chief complaint of Chest pain (07 Dec 2022 16:43). Pt seen & evaluated at bedside. No new pedal complaints. Pt denies N/V/F/C/pain. Dressings D/C/I to LLE.      PAST MEDICAL & SURGICAL HISTORY:  CAD (coronary artery disease)  1 stent 2008      S/P CABG (coronary artery bypass graft)  x4      Diabetes mellitus      Transient ischemic attack (TIA)  2017; 2008      Chronic kidney disease (CKD)  Stage IV      Hypertension      Stented coronary artery  in 2008      Myocardial infarction  2012      PAD (peripheral artery disease)  S/p bypass left leg      HLD (hyperlipidemia)      BPH (benign prostatic hyperplasia)      Pain in left knee  s/p fall      OA (osteoarthritis)      [place] (hard of hearing)      Chronic anemia      S/P CABG (coronary artery bypass graft)  2012      H/O arterial bypass of lower limb  Left Lower Extremity (2016)      History of surgery  Left CEA (2017)  Left Pinkie toe Amputation (2014)  CABG x 4 (2012)  Card cath - stent (2008)        AV fistula  2019  LEFT AV FISTULA           Objective:  Vital Signs Last 24 Hrs  T(C): 36 (07 Dec 2022 15:30), Max: 36.6 (07 Dec 2022 08:04)  T(F): 96.8 (07 Dec 2022 15:30), Max: 97.9 (07 Dec 2022 08:04)  HR: 84 (07 Dec 2022 17:21) (76 - 92)  BP: 151/67 (07 Dec 2022 17:21) (129/57 - 164/77)  BP(mean): --  RR: 18 (07 Dec 2022 15:30) (18 - 19)  SpO2: --    Parameters below as of 07 Dec 2022 11:23  Patient On (Oxygen Delivery Method): room air                            7.1    5.33  )-----------( 208      ( 07 Dec 2022 08:15 )             22.1                 12-07    138  |  99  |  54<H>  ----------------------------<  73  5.1<H>   |  24  |  6.2<HH>    Ca    8.6      07 Dec 2022 08:15  Phos  4.0     12-07  Mg     2.0     12-07    TPro  7.2  /  Alb  3.6  /  TBili  0.3  /  DBili  <0.2  /  AST  6   /  ALT  <5  /  AlkPhos  143<H>  12-07            ----------------------------------------      Physical Exam - Left Lower Extremity Focused:   Derm: Open Left Plantar Ulcer @ Plantar Medial Rearfoot;    -Wound Base Fibrogranular/Necrotic; 50% Granular, 25% Fibrotic, 15% Necrotic; No Active Drainage/Bleeding; Mild soupy serosanguinous drainage; new;    -Wound Margins Irregular; Mild hyperkeratosis;    -Mild Malodor;    -Size 8.8cm x 5.8cm   Skin RLE Thin, Atrophic;  Vascular: DP and PT Pulses Diminished; Foot is Warm to Warm to the touch; Capillary Refill Time < 3 Seconds;    Neuro: Protective Sensation Diminished   MSK: No Pain On Palpation at Wound Site     Assessment:  - S/P Excisional Debridement of Soft Tissue & Bone, Left Foot; 10/21/22  - S/P exc dbx st/b Left foot; 10/24/22  - s/p exc dbx st/b left foot; 11/29/22    Plan:  Chart reviewed and Patient evaluated. All Questions and Concerns Addressed and Answered  Local Wound Care; Wound Flushed w/ NS; Wound Dressed w/Xochilt / DSD / ABD / Kerlix / ACE; Q24;  - XOCHILT at Bedside   Next Podiatry Eval: Fri 12/9/22;  Local Wound Care; As Stated Above;  Weight Bearing Status; *STRICT Non-Weighbearing Left Foot**   Continue LWC;  Discussed Plan w/ Attending;    Podiatry

## 2022-12-07 NOTE — PROGRESS NOTE ADULT - SUBJECTIVE AND OBJECTIVE BOX
SCHWABACHER, LAWRENCE  62y      Patient is a 62y old  Male who presents with a chief complaint of Chest pain (30 Nov 2022 13:29)      INTERVAL HPI/OVERNIGHT EVENTS:  Patient seen and examined this afternoon  sitting in the chair  s/p HD today      PHYSICAL EXAM:  GENERAL: NAD, well-groomed, well-developed  HEAD:  Atraumatic, Normocephalic  EYES: EOMI, PERRLA, conjunctiva and sclera clear  NERVOUS SYSTEM:  Alert & Oriented X 4, Good concentration  CHEST/LUNG: Clear to percussion bilaterally; No rales, rhonchi, wheezing, or rubs  HEART: Regular rate and rhythm; No murmurs, rubs, or gallops  ABDOMEN: Soft, Nontender, Nondistended; Bowel sounds present  EXTREMITIES:  left foot wrapped   SKIN: No rashes or lesions      Consultant(s) Notes Reviewed:  [x ] YES  [ ] NO  Care Discussed with Consultants/Other Providers [ x] YES  [ ] NO    LABS:                          7.1    5.33  )-----------( 208      ( 07 Dec 2022 08:15 )             22.1     12-07    138  |  99  |  54<H>  ----------------------------<  73  5.1<H>   |  24  |  6.2<HH>    Ca    8.6      07 Dec 2022 08:15  Phos  4.0     12-07  Mg     2.0     12-07    TPro  7.2  /  Alb  3.6  /  TBili  0.3  /  DBili  <0.2  /  AST  6   /  ALT  <5  /  AlkPhos  143<H>  12-07    CAPILLARY BLOOD GLUCOSE  POCT Blood Glucose.: 157 mg/dL (07 Dec 2022 12:02)  POCT Blood Glucose.: 78 mg/dL (07 Dec 2022 07:44)  POCT Blood Glucose.: 194 mg/dL (06 Dec 2022 20:47)  POCT Blood Glucose.: 172 mg/dL (06 Dec 2022 17:17)      MEDICATIONS  (STANDING):  aspirin enteric coated 81 milliGRAM(s) Oral daily  atorvastatin 40 milliGRAM(s) Oral at bedtime  cefepime   IVPB 2000 milliGRAM(s) IV Intermittent <User Schedule>  clopidogrel Tablet 75 milliGRAM(s) Oral daily  cyanocobalamin 1000 MICROGram(s) Oral daily  darbepoetin Injectable Syringe 60 MICROGram(s) IV Push <User Schedule>  dextrose 5%. 1000 milliLiter(s) (100 mL/Hr) IV Continuous <Continuous>  dextrose 5%. 1000 milliLiter(s) (50 mL/Hr) IV Continuous <Continuous>  dextrose 50% Injectable 25 Gram(s) IV Push once  dextrose 50% Injectable 12.5 Gram(s) IV Push once  dextrose 50% Injectable 25 Gram(s) IV Push once  folic acid 1 milliGRAM(s) Oral daily  furosemide    Tablet 40 milliGRAM(s) Oral daily  gentamicin   IVPB 70 milliGRAM(s) IV Intermittent <User Schedule>  glucagon  Injectable 1 milliGRAM(s) IntraMuscular once  guaifenesin/dextromethorphan Oral Liquid 5 milliLiter(s) Oral three times a day  heparin   Injectable 5000 Unit(s) SubCutaneous every 12 hours  influenza   Vaccine 0.5 milliLiter(s) IntraMuscular once  insulin glargine Injectable (LANTUS) 10 Unit(s) SubCutaneous at bedtime  insulin lispro (ADMELOG) corrective regimen sliding scale   SubCutaneous three times a day before meals  insulin lispro Injectable (ADMELOG) 5 Unit(s) SubCutaneous before breakfast  insulin lispro Injectable (ADMELOG) 5 Unit(s) SubCutaneous before lunch  metoprolol tartrate 12.5 milliGRAM(s) Oral every 12 hours  NIFEdipine XL 30 milliGRAM(s) Oral daily  pantoprazole    Tablet 40 milliGRAM(s) Oral before breakfast  polyethylene glycol 3350 17 Gram(s) Oral daily  polyethylene glycol 3350 17 Gram(s) Oral once  regadenoson Injectable 0.4 milliGRAM(s) IV Push once  senna 2 Tablet(s) Oral at bedtime  sevelamer carbonate 800 milliGRAM(s) Oral three times a day with meals  tamsulosin 0.4 milliGRAM(s) Oral at bedtime    MEDICATIONS  (PRN):  acetaminophen     Tablet .. 650 milliGRAM(s) Oral every 6 hours PRN Mild Pain (1 - 3)  artificial  tears Solution 3 Drop(s) Both EYES every 2 hours PRN Dry Eyes  dextrose Oral Gel 15 Gram(s) Oral once PRN Blood Glucose LESS THAN 70 milliGRAM(s)/deciliter  melatonin 3 milliGRAM(s) Oral at bedtime PRN Insomnia

## 2022-12-07 NOTE — PROGRESS NOTE ADULT - TIME BILLING
Direct patient care. Discussed with Housestaff
I have personally seen and examined this patient.    I have reviewed all pertinent clinical information and reviewed all relevant imaging and diagnostic studies personally.   I counseled the patient about diagnostic testing and treatment plan. All questions were answered.   I discussed recommendations with the primary team.
time spent on review of labs, imaging studies, old records, obtaining history, personally examining patient, counselling and communicating with patient, entering orders for medications/tests/etc, discussions with other health care providers, documentation in electronic health records, independent interpretation of labs, imaging/procedure results and care coordination.

## 2022-12-08 VITALS
HEART RATE: 79 BPM | RESPIRATION RATE: 18 BRPM | TEMPERATURE: 98 F | DIASTOLIC BLOOD PRESSURE: 63 MMHG | SYSTOLIC BLOOD PRESSURE: 135 MMHG

## 2022-12-08 LAB
GLUCOSE BLDC GLUCOMTR-MCNC: 123 MG/DL — HIGH (ref 70–99)
GLUCOSE BLDC GLUCOMTR-MCNC: 162 MG/DL — HIGH (ref 70–99)
GLUCOSE BLDC GLUCOMTR-MCNC: 94 MG/DL — SIGNIFICANT CHANGE UP (ref 70–99)

## 2022-12-08 PROCEDURE — 99233 SBSQ HOSP IP/OBS HIGH 50: CPT

## 2022-12-08 RX ORDER — INSULIN LISPRO 100/ML
5 VIAL (ML) SUBCUTANEOUS
Qty: 0 | Refills: 0 | DISCHARGE
Start: 2022-12-08

## 2022-12-08 RX ORDER — DULAGLUTIDE 4.5 MG/.5ML
1.5 INJECTION, SOLUTION SUBCUTANEOUS
Qty: 0 | Refills: 0 | DISCHARGE

## 2022-12-08 RX ADMIN — Medication 1: at 11:58

## 2022-12-08 RX ADMIN — Medication 1 MILLIGRAM(S): at 11:58

## 2022-12-08 RX ADMIN — POLYETHYLENE GLYCOL 3350 17 GRAM(S): 17 POWDER, FOR SOLUTION ORAL at 12:01

## 2022-12-08 RX ADMIN — SEVELAMER CARBONATE 800 MILLIGRAM(S): 2400 POWDER, FOR SUSPENSION ORAL at 11:57

## 2022-12-08 RX ADMIN — CLOPIDOGREL BISULFATE 75 MILLIGRAM(S): 75 TABLET, FILM COATED ORAL at 11:58

## 2022-12-08 RX ADMIN — Medication 5 UNIT(S): at 11:59

## 2022-12-08 RX ADMIN — SEVELAMER CARBONATE 800 MILLIGRAM(S): 2400 POWDER, FOR SUSPENSION ORAL at 08:19

## 2022-12-08 RX ADMIN — Medication 40 MILLIGRAM(S): at 05:43

## 2022-12-08 RX ADMIN — HEPARIN SODIUM 5000 UNIT(S): 5000 INJECTION INTRAVENOUS; SUBCUTANEOUS at 05:44

## 2022-12-08 RX ADMIN — Medication 30 MILLIGRAM(S): at 05:43

## 2022-12-08 RX ADMIN — Medication 12.5 MILLIGRAM(S): at 05:43

## 2022-12-08 RX ADMIN — PREGABALIN 1000 MICROGRAM(S): 225 CAPSULE ORAL at 11:58

## 2022-12-08 RX ADMIN — PANTOPRAZOLE SODIUM 40 MILLIGRAM(S): 20 TABLET, DELAYED RELEASE ORAL at 05:43

## 2022-12-08 RX ADMIN — Medication 81 MILLIGRAM(S): at 11:58

## 2022-12-08 RX ADMIN — Medication 5 MILLILITER(S): at 14:52

## 2022-12-08 NOTE — PROGRESS NOTE ADULT - SUBJECTIVE AND OBJECTIVE BOX
SCHWABACHER, LAWRENCE  62y      Patient is a 62y old  Male who presents with a chief complaint of Chest pain (30 Nov 2022 13:29)      INTERVAL HPI/OVERNIGHT EVENTS:  Patient seen and examined this afternoon  sitting in the chair  s/p HD yesterday       PHYSICAL EXAM:  GENERAL: NAD, well-groomed, well-developed  HEAD:  Atraumatic, Normocephalic  EYES: EOMI, PERRLA, conjunctiva and sclera clear  NERVOUS SYSTEM:  Alert & Oriented X 4, Good concentration  CHEST/LUNG: Clear to percussion bilaterally; No rales, rhonchi, wheezing, or rubs  HEART: Regular rate and rhythm; No murmurs, rubs, or gallops  ABDOMEN: Soft, Nontender, Nondistended; Bowel sounds present  EXTREMITIES:  left foot wrapped   SKIN: No rashes or lesions      Consultant(s) Notes Reviewed:  [x ] YES  [ ] NO  Care Discussed with Consultants/Other Providers [ x] YES  [ ] NO    LABS:                          7.1    5.33  )-----------( 208      ( 07 Dec 2022 08:15 )             22.1     12-07    138  |  99  |  54<H>  ----------------------------<  73  5.1<H>   |  24  |  6.2<HH>    Ca    8.6      07 Dec 2022 08:15  Phos  4.0     12-07  Mg     2.0     12-07    TPro  7.2  /  Alb  3.6  /  TBili  0.3  /  DBili  <0.2  /  AST  6   /  ALT  <5  /  AlkPhos  143<H>  12-07    CAPILLARY BLOOD GLUCOSE  POCT Blood Glucose.: 162 mg/dL (08 Dec 2022 11:24)  POCT Blood Glucose.: 123 mg/dL (08 Dec 2022 07:48)  POCT Blood Glucose.: 181 mg/dL (07 Dec 2022 21:19)  POCT Blood Glucose.: 178 mg/dL (07 Dec 2022 17:03)  POCT Blood Glucose.: 157 mg/dL (07 Dec 2022 12:02)        MEDICATIONS  (STANDING):  aspirin enteric coated 81 milliGRAM(s) Oral daily  atorvastatin 40 milliGRAM(s) Oral at bedtime  cefepime   IVPB 2000 milliGRAM(s) IV Intermittent <User Schedule>  clopidogrel Tablet 75 milliGRAM(s) Oral daily  cyanocobalamin 1000 MICROGram(s) Oral daily  darbepoetin Injectable Syringe 60 MICROGram(s) IV Push <User Schedule>  dextrose 5%. 1000 milliLiter(s) (100 mL/Hr) IV Continuous <Continuous>  dextrose 5%. 1000 milliLiter(s) (50 mL/Hr) IV Continuous <Continuous>  dextrose 50% Injectable 25 Gram(s) IV Push once  dextrose 50% Injectable 12.5 Gram(s) IV Push once  dextrose 50% Injectable 25 Gram(s) IV Push once  folic acid 1 milliGRAM(s) Oral daily  furosemide    Tablet 40 milliGRAM(s) Oral daily  gentamicin   IVPB 70 milliGRAM(s) IV Intermittent <User Schedule>  glucagon  Injectable 1 milliGRAM(s) IntraMuscular once  guaifenesin/dextromethorphan Oral Liquid 5 milliLiter(s) Oral three times a day  heparin   Injectable 5000 Unit(s) SubCutaneous every 12 hours  influenza   Vaccine 0.5 milliLiter(s) IntraMuscular once  insulin glargine Injectable (LANTUS) 10 Unit(s) SubCutaneous at bedtime  insulin lispro (ADMELOG) corrective regimen sliding scale   SubCutaneous three times a day before meals  insulin lispro Injectable (ADMELOG) 5 Unit(s) SubCutaneous before breakfast  insulin lispro Injectable (ADMELOG) 5 Unit(s) SubCutaneous before lunch  metoprolol tartrate 12.5 milliGRAM(s) Oral every 12 hours  NIFEdipine XL 30 milliGRAM(s) Oral daily  pantoprazole    Tablet 40 milliGRAM(s) Oral before breakfast  polyethylene glycol 3350 17 Gram(s) Oral daily  polyethylene glycol 3350 17 Gram(s) Oral once  regadenoson Injectable 0.4 milliGRAM(s) IV Push once  senna 2 Tablet(s) Oral at bedtime  sevelamer carbonate 800 milliGRAM(s) Oral three times a day with meals  tamsulosin 0.4 milliGRAM(s) Oral at bedtime    MEDICATIONS  (PRN):  acetaminophen     Tablet .. 650 milliGRAM(s) Oral every 6 hours PRN Mild Pain (1 - 3)  artificial  tears Solution 3 Drop(s) Both EYES every 2 hours PRN Dry Eyes  dextrose Oral Gel 15 Gram(s) Oral once PRN Blood Glucose LESS THAN 70 milliGRAM(s)/deciliter  melatonin 3 milliGRAM(s) Oral at bedtime PRN Insomnia

## 2022-12-08 NOTE — PROGRESS NOTE ADULT - PROVIDER SPECIALTY LIST ADULT
Hospitalist
Hospitalist
Infectious Disease
Internal Medicine
Nephrology
Podiatry
Hospitalist
Internal Medicine
Nephrology
Podiatry
Hospitalist
Internal Medicine
Nephrology
Neuro Rehabilitation
Podiatry
Cardiology
Hospitalist
Hospitalist
Internal Medicine
Nephrology

## 2022-12-08 NOTE — PROGRESS NOTE ADULT - REASON FOR ADMISSION
Chest pain

## 2022-12-16 NOTE — CDI QUERY NOTE - NSCDIOTHERTXTBX_GEN_ALL_CORE_HH
62 M with Diagnosis: Acute Osteomyelitis, Ankle of the Foot  Documentation:   12/5/2022:  Progress Notes:  Podiatry Attending: Plan:  Bedside Aseptic Debridement of the Right Foot Wound performed under sterile conditions to level of Fascia w/ # 15 Surgical Blade; Pt tolerated procedure well w/no complications;  Local Wound Care; Wound Flushed w/ NS; Wound Dressed w/Xochilt / DSD / ABD / Kerlix / ACE;   Next Podiatry Eval: Wed 12/7/22;  Local Wound Care; As Stated Above;    Based on your professional judgment and documentation above, can the Aseptic Debridement of the Right foot wound be further specified as:     ---  Aseptic Excisional Debridement of the Right Foot Wound down to and including the subcutaneous tissue and fascia  ---  Aseptic non-excisional Debridement of the Right Foot Wound down to and including the subcutaneous tissue and fascia  ----Other (please specify)

## 2022-12-16 NOTE — CDI QUERY NOTE - NSCDINOTEDOCCLARIFICATION_GEN_A_CORE
PLEASE INCLUDE MORE SPECIFIC DOCUMENTATION IN YOUR PROGRESS NOTE AND DISCHARGE SUMMARY.  The documentation in this patient's medical record requires additional clarification to ensure that we accurately capture the patients diagnosis(es), treatment and/or severity of illness. Please document to the greatest level of specificity all corresponding diagnoses (either known or suspected) and/or treatment associated with the clinical information described below.
No

## 2022-12-21 ENCOUNTER — APPOINTMENT (OUTPATIENT)
Dept: PODIATRY | Facility: CLINIC | Age: 62
End: 2022-12-21
Payer: MEDICARE

## 2022-12-21 VITALS
DIASTOLIC BLOOD PRESSURE: 69 MMHG | OXYGEN SATURATION: 98 % | WEIGHT: 189 LBS | BODY MASS INDEX: 26.36 KG/M2 | SYSTOLIC BLOOD PRESSURE: 101 MMHG | TEMPERATURE: 97.1 F | HEART RATE: 76 BPM

## 2022-12-21 DIAGNOSIS — E11.69 TYPE 2 DIABETES MELLITUS WITH OTHER SPECIFIED COMPLICATION: ICD-10-CM

## 2022-12-21 DIAGNOSIS — Z87.891 PERSONAL HISTORY OF NICOTINE DEPENDENCE: ICD-10-CM

## 2022-12-21 DIAGNOSIS — E11.621 TYPE 2 DIABETES MELLITUS WITH FOOT ULCER: ICD-10-CM

## 2022-12-21 DIAGNOSIS — N18.6 END STAGE RENAL DISEASE: ICD-10-CM

## 2022-12-21 DIAGNOSIS — Z99.2 DEPENDENCE ON RENAL DIALYSIS: ICD-10-CM

## 2022-12-21 DIAGNOSIS — I13.2 HYPERTENSIVE HEART AND CHRONIC KIDNEY DISEASE WITH HEART FAILURE AND WITH STAGE 5 CHRONIC KIDNEY DISEASE, OR END STAGE RENAL DISEASE: ICD-10-CM

## 2022-12-21 DIAGNOSIS — Z79.4 LONG TERM (CURRENT) USE OF INSULIN: ICD-10-CM

## 2022-12-21 DIAGNOSIS — I25.2 OLD MYOCARDIAL INFARCTION: ICD-10-CM

## 2022-12-21 DIAGNOSIS — U07.1 COVID-19: ICD-10-CM

## 2022-12-21 DIAGNOSIS — E78.5 HYPERLIPIDEMIA, UNSPECIFIED: ICD-10-CM

## 2022-12-21 DIAGNOSIS — E11.51 TYPE 2 DIABETES MELLITUS WITH DIABETIC PERIPHERAL ANGIOPATHY WITHOUT GANGRENE: ICD-10-CM

## 2022-12-21 DIAGNOSIS — L97.429 NON-PRESSURE CHRONIC ULCER OF LEFT HEEL AND MIDFOOT WITH UNSPECIFIED SEVERITY: ICD-10-CM

## 2022-12-21 DIAGNOSIS — N40.0 BENIGN PROSTATIC HYPERPLASIA WITHOUT LOWER URINARY TRACT SYMPTOMS: ICD-10-CM

## 2022-12-21 DIAGNOSIS — D63.8 ANEMIA IN OTHER CHRONIC DISEASES CLASSIFIED ELSEWHERE: ICD-10-CM

## 2022-12-21 DIAGNOSIS — E11.22 TYPE 2 DIABETES MELLITUS WITH DIABETIC CHRONIC KIDNEY DISEASE: ICD-10-CM

## 2022-12-21 DIAGNOSIS — I25.10 ATHEROSCLEROTIC HEART DISEASE OF NATIVE CORONARY ARTERY WITHOUT ANGINA PECTORIS: ICD-10-CM

## 2022-12-21 DIAGNOSIS — Z16.12 EXTENDED SPECTRUM BETA LACTAMASE (ESBL) RESISTANCE: ICD-10-CM

## 2022-12-21 DIAGNOSIS — I47.20 VENTRICULAR TACHYCARDIA, UNSPECIFIED: ICD-10-CM

## 2022-12-21 DIAGNOSIS — Z89.422 ACQUIRED ABSENCE OF OTHER LEFT TOE(S): ICD-10-CM

## 2022-12-21 DIAGNOSIS — M84.675A PATHOLOGICAL FRACTURE IN OTHER DISEASE, LEFT FOOT, INITIAL ENCOUNTER FOR FRACTURE: ICD-10-CM

## 2022-12-21 DIAGNOSIS — Z95.1 PRESENCE OF AORTOCORONARY BYPASS GRAFT: ICD-10-CM

## 2022-12-21 DIAGNOSIS — M86.172 OTHER ACUTE OSTEOMYELITIS, LEFT ANKLE AND FOOT: ICD-10-CM

## 2022-12-21 DIAGNOSIS — I50.22 CHRONIC SYSTOLIC (CONGESTIVE) HEART FAILURE: ICD-10-CM

## 2022-12-21 PROCEDURE — 11043 DBRDMT MUSC&/FSCA 1ST 20/<: CPT | Mod: 58

## 2022-12-21 PROCEDURE — 11046 DBRDMT MUSC&/FSCA EA ADDL: CPT | Mod: 58

## 2022-12-21 PROCEDURE — 29540 STRAPPING ANKLE &/FOOT: CPT | Mod: 58,59,LT

## 2022-12-21 NOTE — PHYSICAL EXAM
[Ankle Swelling (On Exam)] : present [Ankle Swelling On The Left] : of the left ankle [Ankle Swelling On The Right] : mild [FreeTextEntry3] : Diminished Pulses B/L feet  [FreeTextEntry1] : L plantar heel wound 8.2 x 4.2cm, down to fascia and deep tissue. Fibrotic/necrotic tissue probe to bone. \par  [Diminished Throughout Right Foot] : diminished sensation with monofilament testing throughout right foot [Diminished Throughout Left Foot] : diminished sensation with monofilament testing throughout left foot

## 2022-12-21 NOTE — ASSESSMENT
[FreeTextEntry1] : Excision dbx of necrotic and fibrotic tissue L Heel down to deep fascia\par Irrigated and washed. Dressed with cyndi/DSD/ABD/ACE \par Cont with LWC with CYNDI Daily\par COnt IV abx\par NWB LLE - once infection is cleared and wound is more granular will consider TCC\par F/u with vascular\par RTO 2 - 3 weeks\par  [Verbal] : verbal [Patient] : patient [Demonstrates independently] : demonstrates independently [Foot Care] : foot care [Arterial Disease] : arterial disease

## 2022-12-21 NOTE — HISTORY OF PRESENT ILLNESS
[Wheelchair] : a wheelchair [FreeTextEntry1] : L Heel wound, OM S/p Dbx \par - D/c from hospital. S/p Dbx L heel\par - On PICC line\par - From nursing home

## 2022-12-21 NOTE — REASON FOR VISIT
[Follow-Up Visit] : a follow-up visit for [Formal Caregiver] : formal caregiver [FreeTextEntry2] : L Heel wound, OM S/p Dbx

## 2023-01-01 NOTE — PRE-ANESTHESIA EVALUATION ADULT - NSANTHTIREDRD_ENT_A_CORE
PT Name: Florida King  MR #: [id number]    DOCUMENTATION CLARIFICATION      CDS: CRISTAL Mccarty, RN           Contact information: [email or web address]  This form is a permanent document in the medical record.      Query Date: 2023    By submitting this query, we are merely seeking further clarification of documentation. Please utilize your independent clinical judgment when addressing the question(s) below.    The Medical Record contains the following:   Indicators  Supporting Clinical Findings Location in Medical Record   X Gestation age at birth Gestation Age: 40 wks H&P     X Maternal Blood Type O POS H&P      Maternal Maria Guadalupe/  maternal antibodies detected              X Cord ABO/Rh/Maria Guadalupe  23 20:52   Cord ABO A POS   Cord Direct Maria Guadalupe POS     Maria Guadalupe positive Lab 2052           DO pgn  1038 am     Jaundice documented     X Bilirubin level  23 05:56 23 04:56   BILIRUBIN TOTAL 9.4 9.9   Bilirubin Direct 0.3 0.4   Bilirubin, Indirect 9.10 (H) 9.50 (H)    Lab  -      Treatment     X Phototherapy phototherapy was started this AM  Triple phototherapy initiated AM of 2023 DO pgn  1038 am    X Other Hyperbilirubinemia of  DO pgn  1038 am      Provider, please clarify the significance of the maria guadalupe positive.   [   ] ABO incompatibility   [ xx  ] Maria Guadalupe positive, clinically not significant   [   ] Other (please specify): ______________   [  ] Clinically Undetermined                                                                                                      
No

## 2023-01-11 ENCOUNTER — APPOINTMENT (OUTPATIENT)
Dept: PODIATRY | Facility: CLINIC | Age: 63
End: 2023-01-11
Payer: MEDICARE

## 2023-01-11 PROCEDURE — 99213 OFFICE O/P EST LOW 20 MIN: CPT | Mod: 24,25

## 2023-01-11 PROCEDURE — 11043 DBRDMT MUSC&/FSCA 1ST 20/<: CPT | Mod: 78

## 2023-01-11 PROCEDURE — 11046 DBRDMT MUSC&/FSCA EA ADDL: CPT | Mod: 78

## 2023-01-11 PROCEDURE — 11721 DEBRIDE NAIL 6 OR MORE: CPT | Mod: 78,XS

## 2023-01-11 NOTE — HISTORY OF PRESENT ILLNESS
[Wheelchair] : a wheelchair [FreeTextEntry1] : L Heel wound, OM S/p Dbx \par - Finished Abx via PICC line\par - From nursing home

## 2023-01-11 NOTE — PHYSICAL EXAM
[Ankle Swelling (On Exam)] : present [Ankle Swelling On The Left] : of the left ankle [Ankle Swelling On The Right] : mild [FreeTextEntry3] : Diminished Pulses B/L feet  [FreeTextEntry1] : L plantar heel wound 8 x 4cm, down to fascia and deep tissue. Fibrotic/necrotic tissue. No drainage, No erythema. no swelling\par Elongated nails x8 \par  [Diminished Throughout Right Foot] : diminished sensation with monofilament testing throughout right foot [Diminished Throughout Left Foot] : diminished sensation with monofilament testing throughout left foot

## 2023-01-11 NOTE — ASSESSMENT
[FreeTextEntry1] : Excision dbx of necrotic and fibrotic tissue L Heel down to deep fascia (Size above) \par Irrigated and washed. Dressed with cyndi/DSD/ABD/ACE \par Cont with LWC with CYNDI Daily\par Dbx of nails x8\par NWB LLE \par F/u with Jose D for TCC in 2 weeks\par F/u with vascular [Verbal] : verbal [Patient] : patient [Demonstrates independently] : demonstrates independently [Foot Care] : foot care [Arterial Disease] : arterial disease

## 2023-01-24 ENCOUNTER — APPOINTMENT (OUTPATIENT)
Dept: PODIATRY | Facility: CLINIC | Age: 63
End: 2023-01-24
Payer: MEDICARE

## 2023-01-24 ENCOUNTER — OUTPATIENT (OUTPATIENT)
Dept: OUTPATIENT SERVICES | Facility: HOSPITAL | Age: 63
LOS: 1 days | Discharge: HOME | End: 2023-01-24

## 2023-01-24 DIAGNOSIS — Z98.890 OTHER SPECIFIED POSTPROCEDURAL STATES: Chronic | ICD-10-CM

## 2023-01-24 DIAGNOSIS — Z95.1 PRESENCE OF AORTOCORONARY BYPASS GRAFT: Chronic | ICD-10-CM

## 2023-01-24 DIAGNOSIS — T82.392D: ICD-10-CM

## 2023-01-24 DIAGNOSIS — I77.0 ARTERIOVENOUS FISTULA, ACQUIRED: Chronic | ICD-10-CM

## 2023-01-24 DIAGNOSIS — Z95.828 PRESENCE OF OTHER VASCULAR IMPLANTS AND GRAFTS: Chronic | ICD-10-CM

## 2023-01-24 PROCEDURE — 29445 APPL RIGID TOT CNTC LEG CAST: CPT | Mod: 58,59,LT

## 2023-01-24 PROCEDURE — 11046 DBRDMT MUSC&/FSCA EA ADDL: CPT | Mod: 58

## 2023-01-24 PROCEDURE — 11043 DBRDMT MUSC&/FSCA 1ST 20/<: CPT | Mod: 58

## 2023-01-25 ENCOUNTER — APPOINTMENT (OUTPATIENT)
Dept: VASCULAR SURGERY | Facility: CLINIC | Age: 63
End: 2023-01-25
Payer: MEDICARE

## 2023-01-25 VITALS — WEIGHT: 189 LBS | BODY MASS INDEX: 26.46 KG/M2 | HEIGHT: 71 IN

## 2023-01-25 DIAGNOSIS — I70.492 OTHER ATHEROSCLEROSIS OF AUTOLOGOUS VEIN BYPASS GRAFT(S) OF THE EXTREMITIES, LEFT LEG: ICD-10-CM

## 2023-01-25 PROBLEM — T82.392D: Status: ACTIVE | Noted: 2019-02-25

## 2023-01-25 PROCEDURE — 99212 OFFICE O/P EST SF 10 MIN: CPT

## 2023-01-25 NOTE — PROCEDURE
[] : A mold was applied. [FreeTextEntry1] : wound cleaned and debrided of eschar. wound encompassing heel,to midfoot. wound granular, no signs of active infection. dressed with cyndi,4x4s,ab pad,kurlex. total contact cast applied. went over cast precautions. pt will return next week,sooner if problem arises.

## 2023-01-25 NOTE — PHYSICAL EXAM
[Foot Ulcer] : foot ulcer [Ankle Swelling (On Exam)] : present [Ankle Swelling On The Left] : of the left ankle [Ankle Swelling On The Right] : mild [FreeTextEntry3] : Diminished Pulses B/L feet  [FreeTextEntry1] : L plantar heel wound 7 x 4cm, down to fascia and deep tissue. Fibrotic/necrotic tissue. No drainage, No erythema. no swelling\par  [Diminished Throughout Right Foot] : diminished sensation with monofilament testing throughout right foot [Diminished Throughout Left Foot] : diminished sensation with monofilament testing throughout left foot

## 2023-01-26 DIAGNOSIS — E11.42 TYPE 2 DIABETES MELLITUS WITH DIABETIC POLYNEUROPATHY: ICD-10-CM

## 2023-01-26 DIAGNOSIS — L97.423 NON-PRESSURE CHRONIC ULCER OF LEFT HEEL AND MIDFOOT WITH NECROSIS OF MUSCLE: ICD-10-CM

## 2023-01-26 DIAGNOSIS — E11.621 TYPE 2 DIABETES MELLITUS WITH FOOT ULCER: ICD-10-CM

## 2023-01-26 DIAGNOSIS — I73.9 PERIPHERAL VASCULAR DISEASE, UNSPECIFIED: ICD-10-CM

## 2023-01-31 ENCOUNTER — OUTPATIENT (OUTPATIENT)
Dept: OUTPATIENT SERVICES | Facility: HOSPITAL | Age: 63
LOS: 1 days | Discharge: HOME | End: 2023-01-31

## 2023-01-31 ENCOUNTER — APPOINTMENT (OUTPATIENT)
Dept: PODIATRY | Facility: CLINIC | Age: 63
End: 2023-01-31
Payer: MEDICARE

## 2023-01-31 DIAGNOSIS — Z95.828 PRESENCE OF OTHER VASCULAR IMPLANTS AND GRAFTS: Chronic | ICD-10-CM

## 2023-01-31 DIAGNOSIS — Z95.1 PRESENCE OF AORTOCORONARY BYPASS GRAFT: Chronic | ICD-10-CM

## 2023-01-31 DIAGNOSIS — I77.0 ARTERIOVENOUS FISTULA, ACQUIRED: Chronic | ICD-10-CM

## 2023-01-31 DIAGNOSIS — Z98.890 OTHER SPECIFIED POSTPROCEDURAL STATES: Chronic | ICD-10-CM

## 2023-01-31 PROCEDURE — 11046 DBRDMT MUSC&/FSCA EA ADDL: CPT | Mod: 58,59

## 2023-01-31 PROCEDURE — 29445 APPL RIGID TOT CNTC LEG CAST: CPT | Mod: 59,58,LT

## 2023-01-31 PROCEDURE — 11043 DBRDMT MUSC&/FSCA 1ST 20/<: CPT | Mod: 58,59

## 2023-02-01 DIAGNOSIS — E11.42 TYPE 2 DIABETES MELLITUS WITH DIABETIC POLYNEUROPATHY: ICD-10-CM

## 2023-02-01 DIAGNOSIS — I73.9 PERIPHERAL VASCULAR DISEASE, UNSPECIFIED: ICD-10-CM

## 2023-02-01 DIAGNOSIS — L97.423 NON-PRESSURE CHRONIC ULCER OF LEFT HEEL AND MIDFOOT WITH NECROSIS OF MUSCLE: ICD-10-CM

## 2023-02-01 DIAGNOSIS — E11.621 TYPE 2 DIABETES MELLITUS WITH FOOT ULCER: ICD-10-CM

## 2023-02-01 NOTE — PROCEDURE
[] : A mold was applied. [FreeTextEntry1] : cast removed, foot cleaned. increased granulation to left heel. hypergranular tissue cauterized. wound dressed with silvadene,4x4s,kurlex. total contact cast applied. pt to return next week.

## 2023-02-01 NOTE — END OF VISIT
[FreeTextEntry3] : Dbx of the wound down to deep muscles and fascia (Size in chart)\par Cont with wound care and TCC\par RTO 1 week

## 2023-02-01 NOTE — PHYSICAL EXAM
[Foot Ulcer] : foot ulcer [FreeTextEntry1] : left heel ulcer 7cm x 4cm  [Diminished Throughout Right Foot] : diminished sensation with monofilament testing throughout right foot [Diminished Throughout Left Foot] : diminished sensation with monofilament testing throughout left foot

## 2023-02-07 ENCOUNTER — APPOINTMENT (OUTPATIENT)
Dept: PODIATRY | Facility: CLINIC | Age: 63
End: 2023-02-07

## 2023-02-07 ENCOUNTER — APPOINTMENT (OUTPATIENT)
Dept: PODIATRY | Facility: CLINIC | Age: 63
End: 2023-02-07
Payer: MEDICARE

## 2023-02-07 ENCOUNTER — OUTPATIENT (OUTPATIENT)
Dept: OUTPATIENT SERVICES | Facility: HOSPITAL | Age: 63
LOS: 1 days | End: 2023-02-07
Payer: MEDICARE

## 2023-02-07 DIAGNOSIS — Z95.1 PRESENCE OF AORTOCORONARY BYPASS GRAFT: Chronic | ICD-10-CM

## 2023-02-07 DIAGNOSIS — Z95.828 PRESENCE OF OTHER VASCULAR IMPLANTS AND GRAFTS: Chronic | ICD-10-CM

## 2023-02-07 DIAGNOSIS — Z98.890 OTHER SPECIFIED POSTPROCEDURAL STATES: Chronic | ICD-10-CM

## 2023-02-07 DIAGNOSIS — Z00.00 ENCOUNTER FOR GENERAL ADULT MEDICAL EXAMINATION WITHOUT ABNORMAL FINDINGS: ICD-10-CM

## 2023-02-07 DIAGNOSIS — I77.0 ARTERIOVENOUS FISTULA, ACQUIRED: Chronic | ICD-10-CM

## 2023-02-07 PROCEDURE — 29445 APPL RIGID TOT CNTC LEG CAST: CPT | Mod: LT

## 2023-02-07 PROCEDURE — 11046 DBRDMT MUSC&/FSCA EA ADDL: CPT | Mod: LT

## 2023-02-07 PROCEDURE — 11046 DBRDMT MUSC&/FSCA EA ADDL: CPT

## 2023-02-07 PROCEDURE — 11043 DBRDMT MUSC&/FSCA 1ST 20/<: CPT | Mod: 78

## 2023-02-07 PROCEDURE — 11043 DBRDMT MUSC&/FSCA 1ST 20/<: CPT | Mod: LT

## 2023-02-08 NOTE — PHYSICAL EXAM
[Foot Ulcer] : foot ulcer [FreeTextEntry1] : left heel ulcer with fibrogranular base down to deep fascia Size 7cm x 4cm \par

## 2023-02-08 NOTE — PROCEDURE
[] : A mold was applied. [FreeTextEntry1] : cast removed,wound cleaned.increased granulation to wound site. no signs of active infection. Ex Dbx of deep fascia and deep layers. wound dressed with silvadene,ab pad,kurlex. total contact cast applied.pt to return next week.

## 2023-02-08 NOTE — END OF VISIT
[FreeTextEntry3] : Wound improving with TCC and serial dbx\par Cont with wound care and TCC\par RTO 1 week

## 2023-02-14 ENCOUNTER — APPOINTMENT (OUTPATIENT)
Dept: UROLOGY | Facility: CLINIC | Age: 63
End: 2023-02-14
Payer: MEDICARE

## 2023-02-14 VITALS — SYSTOLIC BLOOD PRESSURE: 104 MMHG | HEART RATE: 51 BPM | DIASTOLIC BLOOD PRESSURE: 57 MMHG

## 2023-02-14 PROCEDURE — 99214 OFFICE O/P EST MOD 30 MIN: CPT

## 2023-02-15 ENCOUNTER — APPOINTMENT (OUTPATIENT)
Dept: PODIATRY | Facility: CLINIC | Age: 63
End: 2023-02-15
Payer: MEDICARE

## 2023-02-15 ENCOUNTER — OUTPATIENT (OUTPATIENT)
Dept: OUTPATIENT SERVICES | Facility: HOSPITAL | Age: 63
LOS: 1 days | End: 2023-02-15
Payer: MEDICARE

## 2023-02-15 ENCOUNTER — APPOINTMENT (OUTPATIENT)
Dept: PODIATRY | Facility: CLINIC | Age: 63
End: 2023-02-15

## 2023-02-15 DIAGNOSIS — Z98.890 OTHER SPECIFIED POSTPROCEDURAL STATES: Chronic | ICD-10-CM

## 2023-02-15 DIAGNOSIS — Z95.1 PRESENCE OF AORTOCORONARY BYPASS GRAFT: Chronic | ICD-10-CM

## 2023-02-15 DIAGNOSIS — Z00.00 ENCOUNTER FOR GENERAL ADULT MEDICAL EXAMINATION WITHOUT ABNORMAL FINDINGS: ICD-10-CM

## 2023-02-15 DIAGNOSIS — I77.0 ARTERIOVENOUS FISTULA, ACQUIRED: Chronic | ICD-10-CM

## 2023-02-15 DIAGNOSIS — M86.272 SUBACUTE OSTEOMYELITIS, LEFT ANKLE AND FOOT: ICD-10-CM

## 2023-02-15 DIAGNOSIS — Z95.828 PRESENCE OF OTHER VASCULAR IMPLANTS AND GRAFTS: Chronic | ICD-10-CM

## 2023-02-15 PROCEDURE — 29445 APPL RIGID TOT CNTC LEG CAST: CPT | Mod: 58,59,LT

## 2023-02-15 PROCEDURE — 11043 DBRDMT MUSC&/FSCA 1ST 20/<: CPT | Mod: 58

## 2023-02-15 PROCEDURE — 29445 APPL RIGID TOT CNTC LEG CAST: CPT | Mod: LT

## 2023-02-15 PROCEDURE — 11043 DBRDMT MUSC&/FSCA 1ST 20/<: CPT | Mod: LT

## 2023-02-15 PROCEDURE — 11046 DBRDMT MUSC&/FSCA EA ADDL: CPT

## 2023-02-15 PROCEDURE — 11046 DBRDMT MUSC&/FSCA EA ADDL: CPT | Mod: LT

## 2023-02-16 DIAGNOSIS — I73.9 PERIPHERAL VASCULAR DISEASE, UNSPECIFIED: ICD-10-CM

## 2023-02-16 DIAGNOSIS — E11.42 TYPE 2 DIABETES MELLITUS WITH DIABETIC POLYNEUROPATHY: ICD-10-CM

## 2023-02-16 DIAGNOSIS — E11.621 TYPE 2 DIABETES MELLITUS WITH FOOT ULCER: ICD-10-CM

## 2023-02-16 DIAGNOSIS — L97.423 NON-PRESSURE CHRONIC ULCER OF LEFT HEEL AND MIDFOOT WITH NECROSIS OF MUSCLE: ICD-10-CM

## 2023-02-16 PROBLEM — M86.272 SUBACUTE OSTEOMYELITIS OF LEFT FOOT: Status: ACTIVE | Noted: 2022-12-21

## 2023-02-16 NOTE — PROCEDURE
[] : A mold was applied. [FreeTextEntry1] : cast removed, leg cleaned. debridement via curet of bio film/eschar. wound with increased granulation. portion with hypergranular material cauterized with silver nitrate. wound dressed with gentian violet,silvadene,ab pad,kurlex. total contact cast applied. pt improving, to return next week.

## 2023-02-16 NOTE — END OF VISIT
[FreeTextEntry3] : Wound improving with TCC and serial dbx\par ex Dbx down to deep tissue and fascia L heel (Size in chart) \par Cont with wound care and TCC\par RTO 1 week

## 2023-02-16 NOTE — PHYSICAL EXAM
[Foot Ulcer] : foot ulcer [FreeTextEntry1] : left heel ulcer with fibrogranular base down to deep fascia Size 6.5cm x 4cm \par

## 2023-02-17 DIAGNOSIS — L97.423 NON-PRESSURE CHRONIC ULCER OF LEFT HEEL AND MIDFOOT WITH NECROSIS OF MUSCLE: ICD-10-CM

## 2023-02-17 DIAGNOSIS — E11.42 TYPE 2 DIABETES MELLITUS WITH DIABETIC POLYNEUROPATHY: ICD-10-CM

## 2023-02-17 DIAGNOSIS — E11.621 TYPE 2 DIABETES MELLITUS WITH FOOT ULCER: ICD-10-CM

## 2023-02-17 DIAGNOSIS — I73.9 PERIPHERAL VASCULAR DISEASE, UNSPECIFIED: ICD-10-CM

## 2023-02-22 ENCOUNTER — APPOINTMENT (OUTPATIENT)
Dept: PODIATRY | Facility: CLINIC | Age: 63
End: 2023-02-22

## 2023-02-22 ENCOUNTER — APPOINTMENT (OUTPATIENT)
Dept: PODIATRY | Facility: CLINIC | Age: 63
End: 2023-02-22
Payer: MEDICARE

## 2023-02-22 ENCOUNTER — OUTPATIENT (OUTPATIENT)
Dept: OUTPATIENT SERVICES | Facility: HOSPITAL | Age: 63
LOS: 1 days | End: 2023-02-22
Payer: MEDICARE

## 2023-02-22 DIAGNOSIS — N18.6 END STAGE RENAL DISEASE: ICD-10-CM

## 2023-02-22 DIAGNOSIS — Z95.1 PRESENCE OF AORTOCORONARY BYPASS GRAFT: Chronic | ICD-10-CM

## 2023-02-22 DIAGNOSIS — Z95.828 PRESENCE OF OTHER VASCULAR IMPLANTS AND GRAFTS: Chronic | ICD-10-CM

## 2023-02-22 DIAGNOSIS — Z98.890 OTHER SPECIFIED POSTPROCEDURAL STATES: Chronic | ICD-10-CM

## 2023-02-22 DIAGNOSIS — I77.0 ARTERIOVENOUS FISTULA, ACQUIRED: Chronic | ICD-10-CM

## 2023-02-22 DIAGNOSIS — Z00.00 ENCOUNTER FOR GENERAL ADULT MEDICAL EXAMINATION WITHOUT ABNORMAL FINDINGS: ICD-10-CM

## 2023-02-22 PROCEDURE — 11045 DBRDMT SUBQ TISS EACH ADDL: CPT | Mod: 50

## 2023-02-22 PROCEDURE — 29445 APPL RIGID TOT CNTC LEG CAST: CPT | Mod: 58

## 2023-02-22 PROCEDURE — 11042 DBRDMT SUBQ TIS 1ST 20SQCM/<: CPT | Mod: 50

## 2023-02-22 PROCEDURE — 11042 DBRDMT SUBQ TIS 1ST 20SQCM/<: CPT | Mod: 58

## 2023-02-22 PROCEDURE — 29445 APPL RIGID TOT CNTC LEG CAST: CPT | Mod: 50

## 2023-02-24 PROBLEM — N18.6 END STAGE RENAL DISEASE: Status: ACTIVE | Noted: 2019-10-15

## 2023-02-24 NOTE — END OF VISIT
[FreeTextEntry3] : Dbx of soft tissue down and including subcutaneous tissue L foot Size 6x4cm\par TCC L foot\par RTO 1 week

## 2023-02-24 NOTE — PROCEDURE
[] : A mold was applied. [FreeTextEntry1] : cast removed, foot cleaned. hypergrANULAR TISSUE CAUTERIZED DOWN,GENTIAN VIOLET AND SILVADENE APPLIED,4X4S,AB PAD,KURLEX. SPLINT FABRICATED AND APPLIED. PT TO RETURN NEXT WEEK.

## 2023-02-27 DIAGNOSIS — L97.423 NON-PRESSURE CHRONIC ULCER OF LEFT HEEL AND MIDFOOT WITH NECROSIS OF MUSCLE: ICD-10-CM

## 2023-02-27 DIAGNOSIS — I73.9 PERIPHERAL VASCULAR DISEASE, UNSPECIFIED: ICD-10-CM

## 2023-02-27 DIAGNOSIS — E11.42 TYPE 2 DIABETES MELLITUS WITH DIABETIC POLYNEUROPATHY: ICD-10-CM

## 2023-02-27 DIAGNOSIS — E11.621 TYPE 2 DIABETES MELLITUS WITH FOOT ULCER: ICD-10-CM

## 2023-03-01 ENCOUNTER — APPOINTMENT (OUTPATIENT)
Dept: PODIATRY | Facility: CLINIC | Age: 63
End: 2023-03-01

## 2023-03-01 ENCOUNTER — APPOINTMENT (OUTPATIENT)
Dept: PODIATRY | Facility: CLINIC | Age: 63
End: 2023-03-01
Payer: MEDICARE

## 2023-03-01 ENCOUNTER — OUTPATIENT (OUTPATIENT)
Dept: OUTPATIENT SERVICES | Facility: HOSPITAL | Age: 63
LOS: 1 days | End: 2023-03-01
Payer: MEDICARE

## 2023-03-01 DIAGNOSIS — Z98.890 OTHER SPECIFIED POSTPROCEDURAL STATES: Chronic | ICD-10-CM

## 2023-03-01 DIAGNOSIS — Z95.828 PRESENCE OF OTHER VASCULAR IMPLANTS AND GRAFTS: Chronic | ICD-10-CM

## 2023-03-01 DIAGNOSIS — L97.423 NON-PRESSURE CHRONIC ULCER OF LEFT HEEL AND MIDFOOT WITH NECROSIS OF MUSCLE: ICD-10-CM

## 2023-03-01 DIAGNOSIS — Z00.00 ENCOUNTER FOR GENERAL ADULT MEDICAL EXAMINATION WITHOUT ABNORMAL FINDINGS: ICD-10-CM

## 2023-03-01 DIAGNOSIS — Z95.1 PRESENCE OF AORTOCORONARY BYPASS GRAFT: Chronic | ICD-10-CM

## 2023-03-01 DIAGNOSIS — I77.0 ARTERIOVENOUS FISTULA, ACQUIRED: Chronic | ICD-10-CM

## 2023-03-01 PROCEDURE — 11045 DBRDMT SUBQ TISS EACH ADDL: CPT | Mod: 59

## 2023-03-01 PROCEDURE — 29515 APPLICATION SHORT LEG SPLINT: CPT | Mod: 59,LT

## 2023-03-01 PROCEDURE — 11042 DBRDMT SUBQ TIS 1ST 20SQCM/<: CPT | Mod: 59

## 2023-03-01 PROCEDURE — 11045 DBRDMT SUBQ TISS EACH ADDL: CPT | Mod: LT

## 2023-03-01 PROCEDURE — 11042 DBRDMT SUBQ TIS 1ST 20SQCM/<: CPT | Mod: LT

## 2023-03-02 DIAGNOSIS — E11.42 TYPE 2 DIABETES MELLITUS WITH DIABETIC POLYNEUROPATHY: ICD-10-CM

## 2023-03-02 DIAGNOSIS — E11.621 TYPE 2 DIABETES MELLITUS WITH FOOT ULCER: ICD-10-CM

## 2023-03-02 DIAGNOSIS — I73.9 PERIPHERAL VASCULAR DISEASE, UNSPECIFIED: ICD-10-CM

## 2023-03-02 DIAGNOSIS — L97.422 NON-PRESSURE CHRONIC ULCER OF LEFT HEEL AND MIDFOOT WITH FAT LAYER EXPOSED: ICD-10-CM

## 2023-03-02 PROBLEM — L97.423 CHRONIC ULCER OF LEFT HEEL WITH NECROSIS OF MUSCLE: Status: ACTIVE | Noted: 2022-12-21

## 2023-03-02 NOTE — END OF VISIT
[FreeTextEntry3] : Wound Dbx Size above\par Cont with TCC\par WOund improving with serial dbx and TCC\par RTO 1 week

## 2023-03-02 NOTE — PROCEDURE
[FreeTextEntry1] : splint removed and refurbished and strengthen, wound cleaned and debrided of bio film and eschar. hypergranular tissue cauterized down and dressed with gentian violet. granular tissue dressed with cyndi, 4x4s,kurlex, splint reapplied. pt to return in 1 week.

## 2023-03-08 ENCOUNTER — APPOINTMENT (OUTPATIENT)
Dept: PODIATRY | Facility: CLINIC | Age: 63
End: 2023-03-08
Payer: MEDICARE

## 2023-03-08 ENCOUNTER — OUTPATIENT (OUTPATIENT)
Dept: OUTPATIENT SERVICES | Facility: HOSPITAL | Age: 63
LOS: 1 days | End: 2023-03-08
Payer: MEDICARE

## 2023-03-08 DIAGNOSIS — Z95.828 PRESENCE OF OTHER VASCULAR IMPLANTS AND GRAFTS: Chronic | ICD-10-CM

## 2023-03-08 DIAGNOSIS — Z98.890 OTHER SPECIFIED POSTPROCEDURAL STATES: Chronic | ICD-10-CM

## 2023-03-08 DIAGNOSIS — I77.0 ARTERIOVENOUS FISTULA, ACQUIRED: Chronic | ICD-10-CM

## 2023-03-08 DIAGNOSIS — Z00.00 ENCOUNTER FOR GENERAL ADULT MEDICAL EXAMINATION WITHOUT ABNORMAL FINDINGS: ICD-10-CM

## 2023-03-08 DIAGNOSIS — Z95.1 PRESENCE OF AORTOCORONARY BYPASS GRAFT: Chronic | ICD-10-CM

## 2023-03-08 PROCEDURE — 11042 DBRDMT SUBQ TIS 1ST 20SQCM/<: CPT | Mod: 59

## 2023-03-08 PROCEDURE — 29445 APPL RIGID TOT CNTC LEG CAST: CPT | Mod: 59,LT

## 2023-03-08 PROCEDURE — 11042 DBRDMT SUBQ TIS 1ST 20SQCM/<: CPT

## 2023-03-10 DIAGNOSIS — L97.422 NON-PRESSURE CHRONIC ULCER OF LEFT HEEL AND MIDFOOT WITH FAT LAYER EXPOSED: ICD-10-CM

## 2023-03-10 DIAGNOSIS — E11.621 TYPE 2 DIABETES MELLITUS WITH FOOT ULCER: ICD-10-CM

## 2023-03-10 NOTE — PROCEDURE
[FreeTextEntry1] : splint removed and refurbished. wound site cleaned. debrided of hypergranular tissue. dressed with silver nitrate and gentian violet to hypergranular tissue granular portion dressed with cyndi, 4x4s,ab pad kurlex applied. TCC reapplied. pt to return next week.

## 2023-03-14 ENCOUNTER — APPOINTMENT (OUTPATIENT)
Dept: UROLOGY | Facility: CLINIC | Age: 63
End: 2023-03-14

## 2023-03-20 ENCOUNTER — APPOINTMENT (OUTPATIENT)
Dept: PODIATRY | Facility: CLINIC | Age: 63
End: 2023-03-20
Payer: MEDICARE

## 2023-03-20 ENCOUNTER — OUTPATIENT (OUTPATIENT)
Dept: OUTPATIENT SERVICES | Facility: HOSPITAL | Age: 63
LOS: 1 days | End: 2023-03-20
Payer: MEDICARE

## 2023-03-20 DIAGNOSIS — Z95.828 PRESENCE OF OTHER VASCULAR IMPLANTS AND GRAFTS: Chronic | ICD-10-CM

## 2023-03-20 DIAGNOSIS — Z95.1 PRESENCE OF AORTOCORONARY BYPASS GRAFT: Chronic | ICD-10-CM

## 2023-03-20 DIAGNOSIS — I77.0 ARTERIOVENOUS FISTULA, ACQUIRED: Chronic | ICD-10-CM

## 2023-03-20 DIAGNOSIS — Z00.00 ENCOUNTER FOR GENERAL ADULT MEDICAL EXAMINATION WITHOUT ABNORMAL FINDINGS: ICD-10-CM

## 2023-03-20 DIAGNOSIS — Z98.890 OTHER SPECIFIED POSTPROCEDURAL STATES: Chronic | ICD-10-CM

## 2023-03-20 PROCEDURE — 29445 APPL RIGID TOT CNTC LEG CAST: CPT | Mod: LT,59

## 2023-03-20 PROCEDURE — 11042 DBRDMT SUBQ TIS 1ST 20SQCM/<: CPT

## 2023-03-20 PROCEDURE — 11045 DBRDMT SUBQ TISS EACH ADDL: CPT | Mod: 59

## 2023-03-20 PROCEDURE — 11045 DBRDMT SUBQ TISS EACH ADDL: CPT

## 2023-03-20 PROCEDURE — 11042 DBRDMT SUBQ TIS 1ST 20SQCM/<: CPT | Mod: LT

## 2023-03-20 PROCEDURE — 29515 APPLICATION SHORT LEG SPLINT: CPT

## 2023-03-21 NOTE — PROCEDURE
[] : A mold was applied. [FreeTextEntry1] : splint removed, refurbished. wound cleaned. mild debridement of bio film via curet.hypergranular tissue cauterized down.wound  dressed with silvadene,ab pad kurlex. splint reapplied.pt to return next week.

## 2023-03-21 NOTE — END OF VISIT
[FreeTextEntry2] : Sharp Excisional debridement of fibrotic tissue down and inducing subcutaneous level using a dermal curet. Size 4.5cm x5cm. pt tolerated procedure well. \par TCC applied to LLE \par WOund improving with serial dbx and off loading\par RTO 1 week

## 2023-03-23 DIAGNOSIS — E11.621 TYPE 2 DIABETES MELLITUS WITH FOOT ULCER: ICD-10-CM

## 2023-03-23 DIAGNOSIS — L97.422 NON-PRESSURE CHRONIC ULCER OF LEFT HEEL AND MIDFOOT WITH FAT LAYER EXPOSED: ICD-10-CM

## 2023-03-29 ENCOUNTER — APPOINTMENT (OUTPATIENT)
Dept: PODIATRY | Facility: CLINIC | Age: 63
End: 2023-03-29
Payer: MEDICARE

## 2023-03-29 ENCOUNTER — OUTPATIENT (OUTPATIENT)
Dept: OUTPATIENT SERVICES | Facility: HOSPITAL | Age: 63
LOS: 1 days | End: 2023-03-29
Payer: MEDICARE

## 2023-03-29 DIAGNOSIS — Z98.890 OTHER SPECIFIED POSTPROCEDURAL STATES: Chronic | ICD-10-CM

## 2023-03-29 DIAGNOSIS — Z00.00 ENCOUNTER FOR GENERAL ADULT MEDICAL EXAMINATION WITHOUT ABNORMAL FINDINGS: ICD-10-CM

## 2023-03-29 DIAGNOSIS — I77.0 ARTERIOVENOUS FISTULA, ACQUIRED: Chronic | ICD-10-CM

## 2023-03-29 DIAGNOSIS — Z95.828 PRESENCE OF OTHER VASCULAR IMPLANTS AND GRAFTS: Chronic | ICD-10-CM

## 2023-03-29 DIAGNOSIS — Z95.1 PRESENCE OF AORTOCORONARY BYPASS GRAFT: Chronic | ICD-10-CM

## 2023-03-29 PROCEDURE — 11045 DBRDMT SUBQ TISS EACH ADDL: CPT

## 2023-03-29 PROCEDURE — 29515 APPLICATION SHORT LEG SPLINT: CPT | Mod: LT,59

## 2023-03-29 PROCEDURE — 11045 DBRDMT SUBQ TISS EACH ADDL: CPT | Mod: LT

## 2023-03-29 PROCEDURE — 29515 APPLICATION SHORT LEG SPLINT: CPT | Mod: LT

## 2023-03-29 PROCEDURE — 99213 OFFICE O/P EST LOW 20 MIN: CPT | Mod: 25

## 2023-03-29 PROCEDURE — 11042 DBRDMT SUBQ TIS 1ST 20SQCM/<: CPT | Mod: LT

## 2023-03-29 PROCEDURE — 99213 OFFICE O/P EST LOW 20 MIN: CPT | Mod: 25,24

## 2023-03-29 PROCEDURE — 11042 DBRDMT SUBQ TIS 1ST 20SQCM/<: CPT

## 2023-03-30 NOTE — PHYSICAL EXAM
[Foot Ulcer] : foot ulcer [FreeTextEntry1] : L Heel Ulcer, down to Subcutaneous Tissue, Size 4cm x4.5cm, no erythema, no swelling, no drainage\par R posterior heel ulcer 2.5cm x 2.5cm, down to Subcutaneous Tissue

## 2023-03-30 NOTE — PROCEDURE
[] : A mold was applied. [FreeTextEntry1] : splint removed,total refurbished inside of splint. wound to right foot debrided of eschar. increased granulation,audrey. dressed with silvadene,4x4s ab pad,kurlex,splint reapplied. pt with new breakdown to right heel. debrided of callous to periwound. wound granular no signs of infection Sizes above. dressed with silvadene,4x4s,ab pad jurlex. nursing will change daily. pt to return next week.

## 2023-03-30 NOTE — END OF VISIT
[FreeTextEntry2] : Sharp Ex Dbx of subcutaneous tissue B/L heels, Sizes above. \par Splint applied\par Cont with wound care\par RTO 1 week \par Separate to the procedure new findings discussed with the patient. Times spent with the patient does not include the procedures \par  [Time Spent: ___ minutes] : I have spent [unfilled] minutes of time on the encounter.

## 2023-03-30 NOTE — HISTORY OF PRESENT ILLNESS
[FreeTextEntry1] : L Heel ulcer\par - Improvement with wound care, serial dbx and TCC \par \par New R heel ulcer\par - No prior treatment\par - No dressing\par

## 2023-03-31 ENCOUNTER — RESULT REVIEW (OUTPATIENT)
Age: 63
End: 2023-03-31

## 2023-03-31 ENCOUNTER — OUTPATIENT (OUTPATIENT)
Dept: OUTPATIENT SERVICES | Facility: HOSPITAL | Age: 63
LOS: 1 days | End: 2023-03-31
Payer: MEDICARE

## 2023-03-31 DIAGNOSIS — L97.422 NON-PRESSURE CHRONIC ULCER OF LEFT HEEL AND MIDFOOT WITH FAT LAYER EXPOSED: ICD-10-CM

## 2023-03-31 DIAGNOSIS — I77.0 ARTERIOVENOUS FISTULA, ACQUIRED: Chronic | ICD-10-CM

## 2023-03-31 DIAGNOSIS — L97.412 NON-PRESSURE CHRONIC ULCER OF RIGHT HEEL AND MIDFOOT WITH FAT LAYER EXPOSED: ICD-10-CM

## 2023-03-31 DIAGNOSIS — Z95.828 PRESENCE OF OTHER VASCULAR IMPLANTS AND GRAFTS: Chronic | ICD-10-CM

## 2023-03-31 DIAGNOSIS — R97.20 ELEVATED PROSTATE SPECIFIC ANTIGEN [PSA]: ICD-10-CM

## 2023-03-31 DIAGNOSIS — Z00.8 ENCOUNTER FOR OTHER GENERAL EXAMINATION: ICD-10-CM

## 2023-03-31 DIAGNOSIS — E11.621 TYPE 2 DIABETES MELLITUS WITH FOOT ULCER: ICD-10-CM

## 2023-03-31 DIAGNOSIS — Z98.890 OTHER SPECIFIED POSTPROCEDURAL STATES: Chronic | ICD-10-CM

## 2023-03-31 DIAGNOSIS — Z95.1 PRESENCE OF AORTOCORONARY BYPASS GRAFT: Chronic | ICD-10-CM

## 2023-03-31 PROCEDURE — 72197 MRI PELVIS W/O & W/DYE: CPT

## 2023-03-31 PROCEDURE — 72197 MRI PELVIS W/O & W/DYE: CPT | Mod: 26,MH

## 2023-03-31 PROCEDURE — A9579: CPT

## 2023-04-01 DIAGNOSIS — R97.20 ELEVATED PROSTATE SPECIFIC ANTIGEN [PSA]: ICD-10-CM

## 2023-04-05 ENCOUNTER — APPOINTMENT (OUTPATIENT)
Dept: PODIATRY | Facility: CLINIC | Age: 63
End: 2023-04-05
Payer: MEDICARE

## 2023-04-05 ENCOUNTER — OUTPATIENT (OUTPATIENT)
Dept: OUTPATIENT SERVICES | Facility: HOSPITAL | Age: 63
LOS: 1 days | End: 2023-04-05
Payer: MEDICARE

## 2023-04-05 DIAGNOSIS — Z95.828 PRESENCE OF OTHER VASCULAR IMPLANTS AND GRAFTS: Chronic | ICD-10-CM

## 2023-04-05 DIAGNOSIS — Z95.1 PRESENCE OF AORTOCORONARY BYPASS GRAFT: Chronic | ICD-10-CM

## 2023-04-05 DIAGNOSIS — Z00.00 ENCOUNTER FOR GENERAL ADULT MEDICAL EXAMINATION WITHOUT ABNORMAL FINDINGS: ICD-10-CM

## 2023-04-05 DIAGNOSIS — I77.0 ARTERIOVENOUS FISTULA, ACQUIRED: Chronic | ICD-10-CM

## 2023-04-05 DIAGNOSIS — Z98.890 OTHER SPECIFIED POSTPROCEDURAL STATES: Chronic | ICD-10-CM

## 2023-04-05 PROCEDURE — 29445 APPL RIGID TOT CNTC LEG CAST: CPT | Mod: LT

## 2023-04-05 PROCEDURE — 11042 DBRDMT SUBQ TIS 1ST 20SQCM/<: CPT

## 2023-04-05 PROCEDURE — 29445 APPL RIGID TOT CNTC LEG CAST: CPT | Mod: 59,LT

## 2023-04-06 NOTE — END OF VISIT
[FreeTextEntry2] : Sharp Ex Dbx fibrotic tissue down and including subcutaneous tissue B/L feet. SIze above . \par TCC LLE\par Dressing RLE\par RTO 1 week

## 2023-04-06 NOTE — PROCEDURE
[FreeTextEntry1] : dressings removed, wounds cleaned. wound to left heel debrided with curet. callous to periwound right debrided via number 15 blade.wounds improving,audrey.granular. left dressed with cyndi,ab pad,kurlex. splint refurbished and applied. right dressed with silvadene,2x2s,omnifix. pt progressing well. return next week.

## 2023-04-06 NOTE — PHYSICAL EXAM
[Foot Ulcer] : foot ulcer [FreeTextEntry1] : L Heel Ulcer, down to Subcutaneous Tissue, Size 4cm x4cm, no erythema, no swelling, no drainage\par R posterior heel ulcer 2cm x 2cm, down to Subcutaneous Tissue

## 2023-04-07 DIAGNOSIS — L97.422 NON-PRESSURE CHRONIC ULCER OF LEFT HEEL AND MIDFOOT WITH FAT LAYER EXPOSED: ICD-10-CM

## 2023-04-07 DIAGNOSIS — E11.621 TYPE 2 DIABETES MELLITUS WITH FOOT ULCER: ICD-10-CM

## 2023-04-07 DIAGNOSIS — E11.42 TYPE 2 DIABETES MELLITUS WITH DIABETIC POLYNEUROPATHY: ICD-10-CM

## 2023-04-07 DIAGNOSIS — L97.412 NON-PRESSURE CHRONIC ULCER OF RIGHT HEEL AND MIDFOOT WITH FAT LAYER EXPOSED: ICD-10-CM

## 2023-04-11 NOTE — ASU PREOP CHECKLIST - ANTIBIOTIC
Attempted to contact patient regarding missed smoking cessation follow up. There was no answer at this time and voicemail is currently unavailable.   n/a

## 2023-04-13 ENCOUNTER — APPOINTMENT (OUTPATIENT)
Dept: PODIATRY | Facility: CLINIC | Age: 63
End: 2023-04-13
Payer: MEDICARE

## 2023-04-13 ENCOUNTER — OUTPATIENT (OUTPATIENT)
Dept: OUTPATIENT SERVICES | Facility: HOSPITAL | Age: 63
LOS: 1 days | End: 2023-04-13
Payer: MEDICARE

## 2023-04-13 DIAGNOSIS — Z98.890 OTHER SPECIFIED POSTPROCEDURAL STATES: Chronic | ICD-10-CM

## 2023-04-13 DIAGNOSIS — Z95.828 PRESENCE OF OTHER VASCULAR IMPLANTS AND GRAFTS: Chronic | ICD-10-CM

## 2023-04-13 DIAGNOSIS — Z00.00 ENCOUNTER FOR GENERAL ADULT MEDICAL EXAMINATION WITHOUT ABNORMAL FINDINGS: ICD-10-CM

## 2023-04-13 DIAGNOSIS — I77.0 ARTERIOVENOUS FISTULA, ACQUIRED: Chronic | ICD-10-CM

## 2023-04-13 DIAGNOSIS — Z95.1 PRESENCE OF AORTOCORONARY BYPASS GRAFT: Chronic | ICD-10-CM

## 2023-04-13 PROCEDURE — 11042 DBRDMT SUBQ TIS 1ST 20SQCM/<: CPT

## 2023-04-13 PROCEDURE — 11042 DBRDMT SUBQ TIS 1ST 20SQCM/<: CPT | Mod: 50

## 2023-04-17 NOTE — PROCEDURE
[FreeTextEntry1] : wounds cleaned. left debrided of eschar via curet. wound granular,audrey. dressed with gentian violet,silvadene,ab pad,kurlex. splint reapplied. right heel wound clean though no new skin growth. pt also with a breakdown to right 2nd digit,0.7 x0.3 superficial to dorsum of digit. dressed both sites with silvadene,2x2s,omnifix. nursing will change right daily. return here next week.

## 2023-04-17 NOTE — END OF VISIT
[FreeTextEntry2] : Sharp Ex Dbx fibrotic tissue down and including subcutaneous tissue B/L feet. SIze above . \par RTO 1 week

## 2023-04-17 NOTE — HISTORY OF PRESENT ILLNESS
[FreeTextEntry1] : L Heel ulcer\par - Improvement with wound care, serial dbx and TCC \par \par R Heel ulcer\par - Dressing applied

## 2023-04-17 NOTE — PHYSICAL EXAM
[Foot Ulcer] : foot ulcer [FreeTextEntry1] : L Heel Ulcer, down to Subcutaneous Tissue, Size 3.5cm x4cm, no erythema, no swelling, no drainage\par R posterior heel ulcer 2cm x 2cm, down to Subcutaneous Tissue

## 2023-04-18 DIAGNOSIS — L97.421 NON-PRESSURE CHRONIC ULCER OF LEFT HEEL AND MIDFOOT LIMITED TO BREAKDOWN OF SKIN: ICD-10-CM

## 2023-04-18 DIAGNOSIS — L97.422 NON-PRESSURE CHRONIC ULCER OF LEFT HEEL AND MIDFOOT WITH FAT LAYER EXPOSED: ICD-10-CM

## 2023-04-18 DIAGNOSIS — E11.621 TYPE 2 DIABETES MELLITUS WITH FOOT ULCER: ICD-10-CM

## 2023-04-19 ENCOUNTER — OUTPATIENT (OUTPATIENT)
Dept: OUTPATIENT SERVICES | Facility: HOSPITAL | Age: 63
LOS: 1 days | End: 2023-04-19
Payer: MEDICARE

## 2023-04-19 ENCOUNTER — APPOINTMENT (OUTPATIENT)
Dept: PODIATRY | Facility: CLINIC | Age: 63
End: 2023-04-19
Payer: MEDICARE

## 2023-04-19 DIAGNOSIS — Z00.00 ENCOUNTER FOR GENERAL ADULT MEDICAL EXAMINATION WITHOUT ABNORMAL FINDINGS: ICD-10-CM

## 2023-04-19 DIAGNOSIS — I77.0 ARTERIOVENOUS FISTULA, ACQUIRED: Chronic | ICD-10-CM

## 2023-04-19 DIAGNOSIS — Z95.828 PRESENCE OF OTHER VASCULAR IMPLANTS AND GRAFTS: Chronic | ICD-10-CM

## 2023-04-19 DIAGNOSIS — L03.115 CELLULITIS OF RIGHT LOWER LIMB: ICD-10-CM

## 2023-04-19 DIAGNOSIS — Z98.890 OTHER SPECIFIED POSTPROCEDURAL STATES: Chronic | ICD-10-CM

## 2023-04-19 DIAGNOSIS — Z95.1 PRESENCE OF AORTOCORONARY BYPASS GRAFT: Chronic | ICD-10-CM

## 2023-04-19 PROCEDURE — 29445 APPL RIGID TOT CNTC LEG CAST: CPT | Mod: 59,LT

## 2023-04-19 PROCEDURE — 99213 OFFICE O/P EST LOW 20 MIN: CPT | Mod: 25

## 2023-04-19 PROCEDURE — 29445 APPL RIGID TOT CNTC LEG CAST: CPT | Mod: LT

## 2023-04-19 PROCEDURE — 11042 DBRDMT SUBQ TIS 1ST 20SQCM/<: CPT

## 2023-04-19 NOTE — END OF VISIT
[FreeTextEntry2] : Sharp Ex Dbx fibrotic tissue down and including subcutaneous tissue B/L feet using a dermal uret. SIze above . \par Recommend Doxy for 1 week\par Dressing changes RLE with xeroform daily\par TCC L foot \par RTO 1 week

## 2023-04-19 NOTE — HISTORY OF PRESENT ILLNESS
[FreeTextEntry1] : L Heel ulcer\par - Improvement with wound care, serial dbx and TCC \par \par R Heel ulcer\par - Dressing applied \par \par New RLE swelling and redness

## 2023-04-19 NOTE — PROCEDURE
[] : A mold was applied. [FreeTextEntry1] : wounds cleaned. left debrided of eschar via curet. wound granular,audrey. dressed with gentian violet,silvadene,ab pad,kurlex. TCC applied . right heel wound clean though no new skin growth. nursing will change right daily. return here next week.

## 2023-04-19 NOTE — PHYSICAL EXAM
[de-identified] : Rocker bottom deformity L foot  [Foot Ulcer] : foot ulcer [FreeTextEntry1] : L Heel Ulcer, down to Subcutaneous Tissue, Size 3.5cm x3.5cm, no erythema, no swelling, no drainage\par R posterior heel ulcer 1.5cm x 1.5cm, down to Subcutaneous Tissue\par RLE pitting edema, blisters, erythema and warmth  [Diminished Throughout Right Foot] : diminished sensation with monofilament testing throughout right foot [Diminished Throughout Left Foot] : diminished sensation with monofilament testing throughout left foot

## 2023-04-20 NOTE — PATIENT PROFILE ADULT - NSPROHMDIABETBLDGLCTARGET_GEN_A_NUR
Stacey Nayak called to ask for an appointment on Monday for her dad. She stated that he has been having increased edema in his feet and ankles. He has been to see his family doctor and had labs drawn also. She stated that he is extremely fatigued with no CP, no SOB. She stated that his BP is 95/59 and 100/62 HR 60-80. He did have what he thought was the flu or Covid a few weeks ago, but tested negative for both. He is taking Lasix 40 mg in the AM.    She stated that he is in [facility] and will be home Friday night. She stated that he is available by phone or you can call her.     Please advise
Will see Monday at 3 per DR Muse Neither
unknown

## 2023-04-26 ENCOUNTER — APPOINTMENT (OUTPATIENT)
Dept: PODIATRY | Facility: CLINIC | Age: 63
End: 2023-04-26
Payer: MEDICARE

## 2023-04-26 ENCOUNTER — OUTPATIENT (OUTPATIENT)
Dept: OUTPATIENT SERVICES | Facility: HOSPITAL | Age: 63
LOS: 1 days | End: 2023-04-26
Payer: MEDICARE

## 2023-04-26 DIAGNOSIS — I77.0 ARTERIOVENOUS FISTULA, ACQUIRED: Chronic | ICD-10-CM

## 2023-04-26 DIAGNOSIS — L03.115 CELLULITIS OF RIGHT LOWER LIMB: ICD-10-CM

## 2023-04-26 DIAGNOSIS — E11.621 TYPE 2 DIABETES MELLITUS WITH FOOT ULCER: ICD-10-CM

## 2023-04-26 DIAGNOSIS — Z98.890 OTHER SPECIFIED POSTPROCEDURAL STATES: Chronic | ICD-10-CM

## 2023-04-26 DIAGNOSIS — Z00.00 ENCOUNTER FOR GENERAL ADULT MEDICAL EXAMINATION WITHOUT ABNORMAL FINDINGS: ICD-10-CM

## 2023-04-26 DIAGNOSIS — I73.9 PERIPHERAL VASCULAR DISEASE, UNSPECIFIED: ICD-10-CM

## 2023-04-26 DIAGNOSIS — L97.412 NON-PRESSURE CHRONIC ULCER OF RIGHT HEEL AND MIDFOOT WITH FAT LAYER EXPOSED: ICD-10-CM

## 2023-04-26 DIAGNOSIS — L97.422 NON-PRESSURE CHRONIC ULCER OF LEFT HEEL AND MIDFOOT WITH FAT LAYER EXPOSED: ICD-10-CM

## 2023-04-26 DIAGNOSIS — Z95.1 PRESENCE OF AORTOCORONARY BYPASS GRAFT: Chronic | ICD-10-CM

## 2023-04-26 DIAGNOSIS — Z95.828 PRESENCE OF OTHER VASCULAR IMPLANTS AND GRAFTS: Chronic | ICD-10-CM

## 2023-04-26 PROCEDURE — 11042 DBRDMT SUBQ TIS 1ST 20SQCM/<: CPT

## 2023-04-26 PROCEDURE — 11042 DBRDMT SUBQ TIS 1ST 20SQCM/<: CPT | Mod: 50

## 2023-04-27 NOTE — PROCEDURE
[FreeTextEntry1] : splint to left l.e. removed and refurbished. wounds to bilateral heels cleaned and debrided of bio film.wounds dressed with cyndi, left 4x4s and ab pad,splint reapplied. right 2x2s,omnifix tubigrip, off loading darco heel shoe applied. pt progressing, increased granulation,audrey. pt to return next week.

## 2023-04-27 NOTE — PHYSICAL EXAM
[Foot Ulcer] : foot ulcer [de-identified] : Rocker bottom deformity L foot  [FreeTextEntry1] : L Heel Ulcer, down to Subcutaneous Tissue, Size 3cm x3.5cm, no erythema, no swelling, no drainage\par R posterior heel ulcer 1cm x 1cm, down to Subcutaneous Tissue [Diminished Throughout Right Foot] : diminished sensation with monofilament testing throughout right foot [Diminished Throughout Left Foot] : diminished sensation with monofilament testing throughout left foot

## 2023-04-27 NOTE — HISTORY OF PRESENT ILLNESS
[FreeTextEntry1] : L Heel ulcer\par - Improvement with wound care, serial dbx and TCC \par \par R Heel ulcer\par - Dressing applied \par \par improved  RLE swelling and redness

## 2023-05-01 DIAGNOSIS — E11.621 TYPE 2 DIABETES MELLITUS WITH FOOT ULCER: ICD-10-CM

## 2023-05-01 DIAGNOSIS — I70.209 UNSPECIFIED ATHEROSCLEROSIS OF NATIVE ARTERIES OF EXTREMITIES, UNSPECIFIED EXTREMITY: ICD-10-CM

## 2023-05-01 DIAGNOSIS — L97.422 NON-PRESSURE CHRONIC ULCER OF LEFT HEEL AND MIDFOOT WITH FAT LAYER EXPOSED: ICD-10-CM

## 2023-05-01 DIAGNOSIS — L97.412 NON-PRESSURE CHRONIC ULCER OF RIGHT HEEL AND MIDFOOT WITH FAT LAYER EXPOSED: ICD-10-CM

## 2023-05-03 ENCOUNTER — OUTPATIENT (OUTPATIENT)
Dept: OUTPATIENT SERVICES | Facility: HOSPITAL | Age: 63
LOS: 1 days | End: 2023-05-03
Payer: MEDICARE

## 2023-05-03 ENCOUNTER — APPOINTMENT (OUTPATIENT)
Dept: PODIATRY | Facility: CLINIC | Age: 63
End: 2023-05-03
Payer: MEDICARE

## 2023-05-03 DIAGNOSIS — I77.0 ARTERIOVENOUS FISTULA, ACQUIRED: Chronic | ICD-10-CM

## 2023-05-03 DIAGNOSIS — Z00.00 ENCOUNTER FOR GENERAL ADULT MEDICAL EXAMINATION WITHOUT ABNORMAL FINDINGS: ICD-10-CM

## 2023-05-03 DIAGNOSIS — Z95.828 PRESENCE OF OTHER VASCULAR IMPLANTS AND GRAFTS: Chronic | ICD-10-CM

## 2023-05-03 DIAGNOSIS — Z95.1 PRESENCE OF AORTOCORONARY BYPASS GRAFT: Chronic | ICD-10-CM

## 2023-05-03 DIAGNOSIS — Z98.890 OTHER SPECIFIED POSTPROCEDURAL STATES: Chronic | ICD-10-CM

## 2023-05-03 PROCEDURE — 11042 DBRDMT SUBQ TIS 1ST 20SQCM/<: CPT

## 2023-05-03 PROCEDURE — 29445 APPL RIGID TOT CNTC LEG CAST: CPT | Mod: LT

## 2023-05-03 PROCEDURE — 29445 APPL RIGID TOT CNTC LEG CAST: CPT | Mod: 59,LT

## 2023-05-03 PROCEDURE — 11042 DBRDMT SUBQ TIS 1ST 20SQCM/<: CPT | Mod: LT

## 2023-05-04 NOTE — HISTORY OF PRESENT ILLNESS
[FreeTextEntry1] : L Heel ulcer\par - Improvement with wound care, serial dbx and TCC \par \par R Heel ulcer\par - Dressing present  \par

## 2023-05-04 NOTE — PROCEDURE
[FreeTextEntry1] : wounds cleaned. heels debrided of eschar. both audrey,increased granulation. right 3rd digit scabbed over,with coding of gentian violet.right anterior tibial wound dressed with xeroform,,2x2,omnifix. heels dressed with cyndi. right 2x2,omnifix. left ab pad,omnifix. right great toe dressed with xeroform,2x2,omnifix. TTC  applied to left l.e.  darco off loading heel shoe to right. pt progressing. pt will return next week.

## 2023-05-04 NOTE — END OF VISIT
[FreeTextEntry2] : Sharp Ex Dbx fibrotic tissue down and including subcutaneous tissue B/L feet using a dermal uret. SIze above . \par TCC L foot \par RTO 1 week

## 2023-05-04 NOTE — PHYSICAL EXAM
[Foot Ulcer] : foot ulcer [de-identified] : Rocker bottom deformity L foot  [FreeTextEntry1] : L Heel Ulcer, down to Subcutaneous Tissue, Size 3cm x3.2cm, no erythema, no swelling, no drainage\par R posterior heel ulcer 0.5cm x 0.5cm, down to Subcutaneous Tissue [Diminished Throughout Right Foot] : diminished sensation with monofilament testing throughout right foot [Diminished Throughout Left Foot] : diminished sensation with monofilament testing throughout left foot

## 2023-05-09 NOTE — ED PROVIDER NOTE - CHIEF COMPLAINT
Chief Complaint   Patient presents with    Medication Refill     Last Appointment with Dr. Corie Da Silva - 12/29/2022  Future Appointments   Date Time Provider Jenny Vanegas   6/12/2023 11:40 AM MD DIAMOND Ovalles BS AMB   7/10/2023  1:00 PM Clance Schilder, MD NEUSM BS AMB   7/18/2023  2:00 PM [email or web address], PEDRO SCOTT BS AMB   7/18/2023  2:20 PM MD TYLER Butcher BS AMB The patient is a 61y Male complaining of medical evaluation.

## 2023-05-10 ENCOUNTER — APPOINTMENT (OUTPATIENT)
Dept: PODIATRY | Facility: CLINIC | Age: 63
End: 2023-05-10
Payer: MEDICARE

## 2023-05-10 ENCOUNTER — OUTPATIENT (OUTPATIENT)
Dept: OUTPATIENT SERVICES | Facility: HOSPITAL | Age: 63
LOS: 1 days | End: 2023-05-10
Payer: MEDICARE

## 2023-05-10 DIAGNOSIS — Z00.00 ENCOUNTER FOR GENERAL ADULT MEDICAL EXAMINATION WITHOUT ABNORMAL FINDINGS: ICD-10-CM

## 2023-05-10 DIAGNOSIS — Z95.828 PRESENCE OF OTHER VASCULAR IMPLANTS AND GRAFTS: Chronic | ICD-10-CM

## 2023-05-10 DIAGNOSIS — Z95.1 PRESENCE OF AORTOCORONARY BYPASS GRAFT: Chronic | ICD-10-CM

## 2023-05-10 DIAGNOSIS — I77.0 ARTERIOVENOUS FISTULA, ACQUIRED: Chronic | ICD-10-CM

## 2023-05-10 DIAGNOSIS — Z98.890 OTHER SPECIFIED POSTPROCEDURAL STATES: Chronic | ICD-10-CM

## 2023-05-10 PROCEDURE — 11042 DBRDMT SUBQ TIS 1ST 20SQCM/<: CPT

## 2023-05-10 PROCEDURE — 11042 DBRDMT SUBQ TIS 1ST 20SQCM/<: CPT | Mod: LT

## 2023-05-10 PROCEDURE — 29445 APPL RIGID TOT CNTC LEG CAST: CPT | Mod: 59,LT

## 2023-05-10 PROCEDURE — 29445 APPL RIGID TOT CNTC LEG CAST: CPT

## 2023-05-12 DIAGNOSIS — L97.412 NON-PRESSURE CHRONIC ULCER OF RIGHT HEEL AND MIDFOOT WITH FAT LAYER EXPOSED: ICD-10-CM

## 2023-05-12 DIAGNOSIS — L97.422 NON-PRESSURE CHRONIC ULCER OF LEFT HEEL AND MIDFOOT WITH FAT LAYER EXPOSED: ICD-10-CM

## 2023-05-12 DIAGNOSIS — E11.621 TYPE 2 DIABETES MELLITUS WITH FOOT ULCER: ICD-10-CM

## 2023-05-12 DIAGNOSIS — I70.209 UNSPECIFIED ATHEROSCLEROSIS OF NATIVE ARTERIES OF EXTREMITIES, UNSPECIFIED EXTREMITY: ICD-10-CM

## 2023-05-17 DIAGNOSIS — L97.412 NON-PRESSURE CHRONIC ULCER OF RIGHT HEEL AND MIDFOOT WITH FAT LAYER EXPOSED: ICD-10-CM

## 2023-05-17 DIAGNOSIS — I70.209 UNSPECIFIED ATHEROSCLEROSIS OF NATIVE ARTERIES OF EXTREMITIES, UNSPECIFIED EXTREMITY: ICD-10-CM

## 2023-05-17 DIAGNOSIS — E11.621 TYPE 2 DIABETES MELLITUS WITH FOOT ULCER: ICD-10-CM

## 2023-05-17 DIAGNOSIS — L97.422 NON-PRESSURE CHRONIC ULCER OF LEFT HEEL AND MIDFOOT WITH FAT LAYER EXPOSED: ICD-10-CM

## 2023-05-17 NOTE — HISTORY OF PRESENT ILLNESS
[FreeTextEntry1] : L Heel ulcer\par - Improvement with wound care, serial dbx and TCC \par \par R Heel ulcer\par - Dressing present  \par - improving \par

## 2023-05-17 NOTE — PROCEDURE
[FreeTextEntry1] : dressings removed,wounds cleaned. right heel audrey,0.5 x 0.5 cm superficial.dressed with cyndi 2x2,omnifix. calf wound superficial, almost healed. dressed with gentian violet,bacitracin,2x2,omnifix. tubigrip applied. left heel debrided via curet,wound audrey,granular. dressed with cyndi,4x4s,ab pad kurlex. TCC applied. pt to return next week.

## 2023-05-17 NOTE — PHYSICAL EXAM
[Foot Ulcer] : foot ulcer [de-identified] : Rocker bottom deformity L foot  [FreeTextEntry1] : L Heel Ulcer, down to Subcutaneous Tissue, Size 2.5cm x3.0cm, no erythema, no swelling, no drainage\par R posterior heel ulcer 0.5cm x 0.5cm, down to Subcutaneous Tissue [Diminished Throughout Right Foot] : diminished sensation with monofilament testing throughout right foot [Diminished Throughout Left Foot] : diminished sensation with monofilament testing throughout left foot

## 2023-05-17 NOTE — END OF VISIT
[FreeTextEntry2] : Sharp Ex Dbx fibrotic tissue down and including subcutaneous tissue B/L feet using a dermal curet. SIze above . \par TCC L foot \par RTO 1 week

## 2023-05-18 ENCOUNTER — OUTPATIENT (OUTPATIENT)
Dept: OUTPATIENT SERVICES | Facility: HOSPITAL | Age: 63
LOS: 1 days | End: 2023-05-18
Payer: MEDICARE

## 2023-05-18 ENCOUNTER — APPOINTMENT (OUTPATIENT)
Dept: PODIATRY | Facility: CLINIC | Age: 63
End: 2023-05-18
Payer: MEDICARE

## 2023-05-18 DIAGNOSIS — I77.0 ARTERIOVENOUS FISTULA, ACQUIRED: Chronic | ICD-10-CM

## 2023-05-18 DIAGNOSIS — Z98.890 OTHER SPECIFIED POSTPROCEDURAL STATES: Chronic | ICD-10-CM

## 2023-05-18 DIAGNOSIS — Z95.1 PRESENCE OF AORTOCORONARY BYPASS GRAFT: Chronic | ICD-10-CM

## 2023-05-18 DIAGNOSIS — Z00.00 ENCOUNTER FOR GENERAL ADULT MEDICAL EXAMINATION WITHOUT ABNORMAL FINDINGS: ICD-10-CM

## 2023-05-18 DIAGNOSIS — Z95.828 PRESENCE OF OTHER VASCULAR IMPLANTS AND GRAFTS: Chronic | ICD-10-CM

## 2023-05-18 PROCEDURE — 11042 DBRDMT SUBQ TIS 1ST 20SQCM/<: CPT

## 2023-05-18 PROCEDURE — 11042 DBRDMT SUBQ TIS 1ST 20SQCM/<: CPT | Mod: 50

## 2023-05-24 ENCOUNTER — OUTPATIENT (OUTPATIENT)
Dept: OUTPATIENT SERVICES | Facility: HOSPITAL | Age: 63
LOS: 1 days | End: 2023-05-24
Payer: MEDICARE

## 2023-05-24 ENCOUNTER — APPOINTMENT (OUTPATIENT)
Dept: PODIATRY | Facility: CLINIC | Age: 63
End: 2023-05-24
Payer: MEDICARE

## 2023-05-24 DIAGNOSIS — L97.412 NON-PRESSURE CHRONIC ULCER OF RIGHT HEEL AND MIDFOOT WITH FAT LAYER EXPOSED: ICD-10-CM

## 2023-05-24 DIAGNOSIS — E11.42 TYPE 2 DIABETES MELLITUS WITH DIABETIC POLYNEUROPATHY: ICD-10-CM

## 2023-05-24 DIAGNOSIS — E11.621 TYPE 2 DIABETES MELLITUS WITH FOOT ULCER: ICD-10-CM

## 2023-05-24 DIAGNOSIS — Z98.890 OTHER SPECIFIED POSTPROCEDURAL STATES: Chronic | ICD-10-CM

## 2023-05-24 DIAGNOSIS — Z95.1 PRESENCE OF AORTOCORONARY BYPASS GRAFT: Chronic | ICD-10-CM

## 2023-05-24 DIAGNOSIS — I77.0 ARTERIOVENOUS FISTULA, ACQUIRED: Chronic | ICD-10-CM

## 2023-05-24 DIAGNOSIS — I70.209 UNSPECIFIED ATHEROSCLEROSIS OF NATIVE ARTERIES OF EXTREMITIES, UNSPECIFIED EXTREMITY: ICD-10-CM

## 2023-05-24 DIAGNOSIS — L97.422 NON-PRESSURE CHRONIC ULCER OF LEFT HEEL AND MIDFOOT WITH FAT LAYER EXPOSED: ICD-10-CM

## 2023-05-24 DIAGNOSIS — Z00.00 ENCOUNTER FOR GENERAL ADULT MEDICAL EXAMINATION WITHOUT ABNORMAL FINDINGS: ICD-10-CM

## 2023-05-24 PROCEDURE — 11042 DBRDMT SUBQ TIS 1ST 20SQCM/<: CPT

## 2023-05-24 PROCEDURE — 11042 DBRDMT SUBQ TIS 1ST 20SQCM/<: CPT | Mod: LT

## 2023-05-24 NOTE — END OF VISIT
[FreeTextEntry2] : Sharp Ex Dbx fibrotic tissue down and including subcutaneous tissue B/L feet using a dermal curet. SIze above . \par RTO 1 week

## 2023-05-24 NOTE — PHYSICAL EXAM
[Foot Ulcer] : foot ulcer [de-identified] : Rocker bottom deformity L foot  [FreeTextEntry1] : L Heel Ulcer, down to Subcutaneous Tissue, Size 2.5cm x3.0cm, no erythema, no swelling, no drainage\par R posterior heel ulcer 0.5cm x 0.5cm, down to Subcutaneous Tissue [Diminished Throughout Right Foot] : diminished sensation with monofilament testing throughout right foot [Diminished Throughout Left Foot] : diminished sensation with monofilament testing throughout left foot

## 2023-05-24 NOTE — PROCEDURE
[FreeTextEntry1] : left splint refurbished. heel cleaned and debrided of eschar via curet. dressed with cyndi,4x4s,ab pad kurlex. betadine, gentian violet and barrier cream placed around wound to prevent maceration. right heel wound much better audrey. dressed with medi honey,[id number],omnifix. pt to return next week.

## 2023-05-25 DIAGNOSIS — E11.621 TYPE 2 DIABETES MELLITUS WITH FOOT ULCER: ICD-10-CM

## 2023-05-25 DIAGNOSIS — I70.209 UNSPECIFIED ATHEROSCLEROSIS OF NATIVE ARTERIES OF EXTREMITIES, UNSPECIFIED EXTREMITY: ICD-10-CM

## 2023-05-25 DIAGNOSIS — L97.422 NON-PRESSURE CHRONIC ULCER OF LEFT HEEL AND MIDFOOT WITH FAT LAYER EXPOSED: ICD-10-CM

## 2023-05-25 NOTE — PHYSICAL EXAM
[Foot Ulcer] : foot ulcer [de-identified] : Rocker bottom deformity L foot  [FreeTextEntry1] : L Heel Ulcer, down to Subcutaneous Tissue, Size 2.5cm x3.0cm, no erythema, no swelling, no drainage\par R posterior heel ulcer healed with epithelization tissue  [Diminished Throughout Right Foot] : diminished sensation with monofilament testing throughout right foot [Diminished Throughout Left Foot] : diminished sensation with monofilament testing throughout left foot

## 2023-05-25 NOTE — PROCEDURE
[FreeTextEntry1] : wounds cleaned. right heel wound almost fully healed,superficial,0.5 cm dm wound remains. superficial skin tears to right calf x 3,heel and calf dressed with bacitracin xeroform.4x4s,kurlex. lefrt heel debrided of callous, bio film. sound audrey,granular. dressed with medi honey,cyndi,4x4s,ab pad kurlex. splint refurbished and applied. pt to return next week.

## 2023-05-25 NOTE — END OF VISIT
[FreeTextEntry2] : Sharp Ex Dbx fibrotic tissue down and including subcutaneous tissue L foot using a dermal curet. SIze above . \par RTO 1 week

## 2023-05-31 ENCOUNTER — APPOINTMENT (OUTPATIENT)
Dept: PODIATRY | Facility: CLINIC | Age: 63
End: 2023-05-31
Payer: MEDICARE

## 2023-05-31 ENCOUNTER — OUTPATIENT (OUTPATIENT)
Dept: OUTPATIENT SERVICES | Facility: HOSPITAL | Age: 63
LOS: 1 days | End: 2023-05-31
Payer: MEDICARE

## 2023-05-31 DIAGNOSIS — Z00.00 ENCOUNTER FOR GENERAL ADULT MEDICAL EXAMINATION WITHOUT ABNORMAL FINDINGS: ICD-10-CM

## 2023-05-31 DIAGNOSIS — I77.0 ARTERIOVENOUS FISTULA, ACQUIRED: Chronic | ICD-10-CM

## 2023-05-31 DIAGNOSIS — L03.116 CELLULITIS OF LEFT LOWER LIMB: ICD-10-CM

## 2023-05-31 DIAGNOSIS — B35.3 TINEA PEDIS: ICD-10-CM

## 2023-05-31 DIAGNOSIS — Z95.1 PRESENCE OF AORTOCORONARY BYPASS GRAFT: Chronic | ICD-10-CM

## 2023-05-31 PROCEDURE — 11042 DBRDMT SUBQ TIS 1ST 20SQCM/<: CPT

## 2023-05-31 PROCEDURE — 99213 OFFICE O/P EST LOW 20 MIN: CPT | Mod: 25

## 2023-05-31 PROCEDURE — 29581 APPL MULTLAYER CMPRN SYS LEG: CPT | Mod: RT,XU

## 2023-05-31 PROCEDURE — 29581 APPL MULTLAYER CMPRN SYS LEG: CPT | Mod: RT

## 2023-05-31 PROCEDURE — 29581 APPL MULTLAYER CMPRN SYS LEG: CPT | Mod: 59,RT

## 2023-05-31 NOTE — HISTORY OF PRESENT ILLNESS
[FreeTextEntry1] : L Heel ulcer\par - Improvement with wound care, serial dbx and TCC \par \par R Heel ulcer\par - Dressing present  \par - healed\par \par New RLE Ulceration\par - Dressing at nursing home \par

## 2023-05-31 NOTE — PHYSICAL EXAM
[Foot Ulcer] : foot ulcer [Diminished Throughout Right Foot] : diminished sensation with monofilament testing throughout right foot [Diminished Throughout Left Foot] : diminished sensation with monofilament testing throughout left foot [Ankle Swelling (On Exam)] : present [Ankle Swelling Bilaterally] : bilaterally  [Ankle Swelling On The Right] : mild [de-identified] : Rocker bottom deformity L foot \par L 5th toe amp [FreeTextEntry1] : L Heel Ulcer, down to Subcutaneous Tissue, Size 2.5cm x1.2cm,  erythema,  swelling, mild green drainage, malodor\par maceration and scaling of L dorsal foot, with erythema present\par RLE anterior shin wound down to level of subcutaneous tissue Size 1cm x 1.5cm \par R posterior heel ulcer healed with epithelization tissue

## 2023-05-31 NOTE — ASSESSMENT
[FreeTextEntry1] : Sharp Ex Dbx fibrotic tissue down and including subcutaneous tissue L foot using a dermal curet. SIze above\par D/C TCC\par Daily Dressing changes with Betadine/DSD Daily \par NWB LLE\par Rx Clida\par \par Fungal infection L foot \par - Betadine/DSD Daily \par - Rx Terbinafine \par \par RLE wond\par Sharp Ex Dbx fibrotic tissue down and including subcutaneous tissue L foot using a dermal curet. SIze above\par multilayer compression dressing \par \par RTO 1 week - possible TCC application

## 2023-06-01 DIAGNOSIS — E11.621 TYPE 2 DIABETES MELLITUS WITH FOOT ULCER: ICD-10-CM

## 2023-06-01 DIAGNOSIS — I70.209 UNSPECIFIED ATHEROSCLEROSIS OF NATIVE ARTERIES OF EXTREMITIES, UNSPECIFIED EXTREMITY: ICD-10-CM

## 2023-06-01 DIAGNOSIS — L97.812 NON-PRESSURE CHRONIC ULCER OF OTHER PART OF RIGHT LOWER LEG WITH FAT LAYER EXPOSED: ICD-10-CM

## 2023-06-01 DIAGNOSIS — L03.116 CELLULITIS OF LEFT LOWER LIMB: ICD-10-CM

## 2023-06-01 DIAGNOSIS — M79.89 OTHER SPECIFIED SOFT TISSUE DISORDERS: ICD-10-CM

## 2023-06-01 DIAGNOSIS — B35.3 TINEA PEDIS: ICD-10-CM

## 2023-06-01 DIAGNOSIS — E11.42 TYPE 2 DIABETES MELLITUS WITH DIABETIC POLYNEUROPATHY: ICD-10-CM

## 2023-06-01 DIAGNOSIS — L97.422 NON-PRESSURE CHRONIC ULCER OF LEFT HEEL AND MIDFOOT WITH FAT LAYER EXPOSED: ICD-10-CM

## 2023-06-07 ENCOUNTER — OUTPATIENT (OUTPATIENT)
Dept: OUTPATIENT SERVICES | Facility: HOSPITAL | Age: 63
LOS: 1 days | End: 2023-06-07
Payer: MEDICARE

## 2023-06-07 ENCOUNTER — APPOINTMENT (OUTPATIENT)
Dept: PODIATRY | Facility: CLINIC | Age: 63
End: 2023-06-07
Payer: MEDICARE

## 2023-06-07 DIAGNOSIS — I77.0 ARTERIOVENOUS FISTULA, ACQUIRED: Chronic | ICD-10-CM

## 2023-06-07 DIAGNOSIS — Z95.828 PRESENCE OF OTHER VASCULAR IMPLANTS AND GRAFTS: Chronic | ICD-10-CM

## 2023-06-07 DIAGNOSIS — E11.621 TYPE 2 DIABETES MELLITUS WITH FOOT ULCER: ICD-10-CM

## 2023-06-07 DIAGNOSIS — Z00.00 ENCOUNTER FOR GENERAL ADULT MEDICAL EXAMINATION WITHOUT ABNORMAL FINDINGS: ICD-10-CM

## 2023-06-07 DIAGNOSIS — Z95.1 PRESENCE OF AORTOCORONARY BYPASS GRAFT: Chronic | ICD-10-CM

## 2023-06-07 DIAGNOSIS — I70.209 UNSPECIFIED ATHEROSCLEROSIS OF NATIVE ARTERIES OF EXTREMITIES, UNSPECIFIED EXTREMITY: ICD-10-CM

## 2023-06-07 DIAGNOSIS — L97.422 NON-PRESSURE CHRONIC ULCER OF LEFT HEEL AND MIDFOOT WITH FAT LAYER EXPOSED: ICD-10-CM

## 2023-06-07 DIAGNOSIS — E11.42 TYPE 2 DIABETES MELLITUS WITH DIABETIC POLYNEUROPATHY: ICD-10-CM

## 2023-06-07 DIAGNOSIS — Z98.890 OTHER SPECIFIED POSTPROCEDURAL STATES: Chronic | ICD-10-CM

## 2023-06-07 PROCEDURE — 11042 DBRDMT SUBQ TIS 1ST 20SQCM/<: CPT

## 2023-06-07 PROCEDURE — 29445 APPL RIGID TOT CNTC LEG CAST: CPT | Mod: 59,LT

## 2023-06-07 PROCEDURE — 29445 APPL RIGID TOT CNTC LEG CAST: CPT | Mod: LT

## 2023-06-07 PROCEDURE — 11042 DBRDMT SUBQ TIS 1ST 20SQCM/<: CPT | Mod: LT

## 2023-06-07 NOTE — ASSESSMENT
[FreeTextEntry1] : Sharp Ex Dbx fibrotic tissue down and including subcutaneous tissue L foot using a dermal curet. SIze above\par Applied TCC \par \par Fungal infection L foot \par - Cont Terbinafine \par \par RLE wound \par Healed \par \par RTO 1 week [Verbal] : verbal [Patient] : patient [Good - alert, interested, motivated] : Good - alert, interested, motivated

## 2023-06-07 NOTE — HISTORY OF PRESENT ILLNESS
[FreeTextEntry1] : L Heel ulcer\par - Improvement with wound care, serial dbx \par - Off TCC \par - On abx and antifungal \par \par \par  RLE Ulceration\par - Dressing at nursing home \par - Healed \par

## 2023-06-07 NOTE — PHYSICAL EXAM
[Foot Ulcer] : foot ulcer [Ankle Swelling (On Exam)] : present [Ankle Swelling Bilaterally] : bilaterally  [Ankle Swelling On The Right] : mild [de-identified] : Rocker bottom deformity L foot \par L 5th toe amp [FreeTextEntry1] : L Heel Ulcer, down to Subcutaneous Tissue, Size 2.3cm x1.2cm,  No erythema,  drainage, malodor\par LLE swelling \par Mild maceration and scaling of L dorsal foot, mild erythema present\par RLE anterior shin wound healed with epithelization tissue  [Diminished Throughout Right Foot] : diminished sensation with monofilament testing throughout right foot [Diminished Throughout Left Foot] : diminished sensation with monofilament testing throughout left foot

## 2023-06-07 NOTE — PROCEDURE
[] : A mold was applied. [FreeTextEntry1] : left heel debrided of bio film. wound granular, audrey. wound dressed with cyndi,ab pad,kurlex. total contact cast applied. right heel is healed. dressed with bacitracin,2x2,omnifix to protect against friction, kurlex, ace wraps and tubigrip to prevent edema. pt to return next week.

## 2023-06-14 ENCOUNTER — APPOINTMENT (OUTPATIENT)
Dept: PODIATRY | Facility: CLINIC | Age: 63
End: 2023-06-14
Payer: MEDICARE

## 2023-06-14 ENCOUNTER — OUTPATIENT (OUTPATIENT)
Dept: OUTPATIENT SERVICES | Facility: HOSPITAL | Age: 63
LOS: 1 days | End: 2023-06-14
Payer: MEDICARE

## 2023-06-14 DIAGNOSIS — Z95.1 PRESENCE OF AORTOCORONARY BYPASS GRAFT: Chronic | ICD-10-CM

## 2023-06-14 DIAGNOSIS — Z95.828 PRESENCE OF OTHER VASCULAR IMPLANTS AND GRAFTS: Chronic | ICD-10-CM

## 2023-06-14 DIAGNOSIS — I77.0 ARTERIOVENOUS FISTULA, ACQUIRED: Chronic | ICD-10-CM

## 2023-06-14 DIAGNOSIS — Z98.890 OTHER SPECIFIED POSTPROCEDURAL STATES: Chronic | ICD-10-CM

## 2023-06-14 DIAGNOSIS — Z00.00 ENCOUNTER FOR GENERAL ADULT MEDICAL EXAMINATION WITHOUT ABNORMAL FINDINGS: ICD-10-CM

## 2023-06-14 PROCEDURE — 11042 DBRDMT SUBQ TIS 1ST 20SQCM/<: CPT

## 2023-06-14 PROCEDURE — 11721 DEBRIDE NAIL 6 OR MORE: CPT | Mod: XU

## 2023-06-14 PROCEDURE — 29445 APPL RIGID TOT CNTC LEG CAST: CPT | Mod: 59,LT

## 2023-06-14 PROCEDURE — 11721 DEBRIDE NAIL 6 OR MORE: CPT | Mod: 59

## 2023-06-14 PROCEDURE — 29445 APPL RIGID TOT CNTC LEG CAST: CPT | Mod: LT

## 2023-06-14 NOTE — ASSESSMENT
[FreeTextEntry1] : Sharp Ex Dbx fibrotic tissue down and including subcutaneous tissue L foot using a dermal curet. SIze above\par Applied TCC \par \par Fungal infection L foot \par - Cont Terbinafine \par \par RLE wound \par Healed \par \par Dbx of nails x 9 \par \par RTO 1 week [Verbal] : verbal [Patient] : patient [Good - alert, interested, motivated] : Good - alert, interested, motivated

## 2023-06-14 NOTE — HISTORY OF PRESENT ILLNESS
[FreeTextEntry1] : L Heel ulcer\par - Improvement with wound care, serial dbx \par - TCC \par \par  RLE Ulceration\par - Healed \par

## 2023-06-14 NOTE — PHYSICAL EXAM
[Ankle Swelling (On Exam)] : present [Ankle Swelling On The Left] : of the left ankle [Ankle Swelling On The Right] : mild [FreeTextEntry3] : Diminished pulses B/L LE [de-identified] : Rocker bottom deformity L foot \par L 5th toe amp [Foot Ulcer] : foot ulcer [FreeTextEntry1] : L Heel Ulcer, down to Subcutaneous Tissue, Size 2.2cm x1.1cm,  No erythema,  drainage, malodor\par Mild LLE swelling \par Mild maceration and scaling of L dorsal foot,\par Elongated nails x  9  [Diminished Throughout Right Foot] : diminished sensation with monofilament testing throughout right foot [Diminished Throughout Left Foot] : diminished sensation with monofilament testing throughout left foot

## 2023-06-15 DIAGNOSIS — L60.3 NAIL DYSTROPHY: ICD-10-CM

## 2023-06-15 DIAGNOSIS — M14.60 CHARCOT'S JOINT, UNSPECIFIED SITE: ICD-10-CM

## 2023-06-15 DIAGNOSIS — E11.42 TYPE 2 DIABETES MELLITUS WITH DIABETIC POLYNEUROPATHY: ICD-10-CM

## 2023-06-15 DIAGNOSIS — E11.621 TYPE 2 DIABETES MELLITUS WITH FOOT ULCER: ICD-10-CM

## 2023-06-15 DIAGNOSIS — I73.9 PERIPHERAL VASCULAR DISEASE, UNSPECIFIED: ICD-10-CM

## 2023-06-15 DIAGNOSIS — L60.2 ONYCHOGRYPHOSIS: ICD-10-CM

## 2023-06-15 DIAGNOSIS — L97.422 NON-PRESSURE CHRONIC ULCER OF LEFT HEEL AND MIDFOOT WITH FAT LAYER EXPOSED: ICD-10-CM

## 2023-06-22 ENCOUNTER — OUTPATIENT (OUTPATIENT)
Dept: OUTPATIENT SERVICES | Facility: HOSPITAL | Age: 63
LOS: 1 days | End: 2023-06-22
Payer: MEDICARE

## 2023-06-22 ENCOUNTER — APPOINTMENT (OUTPATIENT)
Dept: PODIATRY | Facility: CLINIC | Age: 63
End: 2023-06-22
Payer: MEDICARE

## 2023-06-22 DIAGNOSIS — L29.9 PRURITUS, UNSPECIFIED: ICD-10-CM

## 2023-06-22 DIAGNOSIS — Z98.890 OTHER SPECIFIED POSTPROCEDURAL STATES: Chronic | ICD-10-CM

## 2023-06-22 DIAGNOSIS — Z00.00 ENCOUNTER FOR GENERAL ADULT MEDICAL EXAMINATION WITHOUT ABNORMAL FINDINGS: ICD-10-CM

## 2023-06-22 DIAGNOSIS — Z95.828 PRESENCE OF OTHER VASCULAR IMPLANTS AND GRAFTS: Chronic | ICD-10-CM

## 2023-06-22 DIAGNOSIS — I77.0 ARTERIOVENOUS FISTULA, ACQUIRED: Chronic | ICD-10-CM

## 2023-06-22 DIAGNOSIS — Z95.1 PRESENCE OF AORTOCORONARY BYPASS GRAFT: Chronic | ICD-10-CM

## 2023-06-22 PROCEDURE — 11042 DBRDMT SUBQ TIS 1ST 20SQCM/<: CPT

## 2023-06-22 PROCEDURE — 11042 DBRDMT SUBQ TIS 1ST 20SQCM/<: CPT | Mod: LT

## 2023-06-26 PROBLEM — L29.9 PRURITUS: Status: ACTIVE | Noted: 2023-06-26

## 2023-06-26 RX ORDER — HYDROCORTISONE 10 MG/G
1 CREAM TOPICAL TWICE DAILY
Qty: 1 | Refills: 2 | Status: ACTIVE | COMMUNITY
Start: 2023-06-26 | End: 1900-01-01

## 2023-06-27 NOTE — PROCEDURE
[FreeTextEntry1] : dressings removed. redness noted to stevenson. seen by podiatry ordered steroid cream for same. wound to right lateral calf skin tear. dressed with silvadene,xeroform,4x4,omnifix, ace wrap and tubigrip. nursing will change daily. left heel debrided of biofilm via curet. wound audrey granular. dressed with cyndi,ab pad,kurlex. splint applied. see pt next week.

## 2023-06-27 NOTE — PHYSICAL EXAM
[Foot Ulcer] : foot ulcer [Ankle Swelling (On Exam)] : present [Ankle Swelling On The Left] : of the left ankle [Ankle Swelling On The Right] : mild [FreeTextEntry3] : Diminished pulses B/L LE [de-identified] : Rocker bottom deformity L foot \par L 5th toe amp [FreeTextEntry1] : L Heel Ulcer, down to Subcutaneous Tissue, Size 2cm x1.1cm,  No erythema,  drainage, malodor\par Mild LLE swelling \par Mild maceration and scaling of L dorsal foot, [Diminished Throughout Right Foot] : diminished sensation with monofilament testing throughout right foot [Diminished Throughout Left Foot] : diminished sensation with monofilament testing throughout left foot

## 2023-06-27 NOTE — ASSESSMENT
[FreeTextEntry1] : Sharp Ex Dbx fibrotic tissue down and including subcutaneous tissue L foot using a dermal curet. SIze above\par RTO 1 week [Verbal] : verbal [Patient] : patient [Good - alert, interested, motivated] : Good - alert, interested, motivated

## 2023-06-28 ENCOUNTER — APPOINTMENT (OUTPATIENT)
Dept: PODIATRY | Facility: CLINIC | Age: 63
End: 2023-06-28
Payer: MEDICARE

## 2023-06-28 ENCOUNTER — OUTPATIENT (OUTPATIENT)
Dept: OUTPATIENT SERVICES | Facility: HOSPITAL | Age: 63
LOS: 1 days | End: 2023-06-28
Payer: MEDICARE

## 2023-06-28 DIAGNOSIS — Z95.1 PRESENCE OF AORTOCORONARY BYPASS GRAFT: Chronic | ICD-10-CM

## 2023-06-28 DIAGNOSIS — Z00.00 ENCOUNTER FOR GENERAL ADULT MEDICAL EXAMINATION WITHOUT ABNORMAL FINDINGS: ICD-10-CM

## 2023-06-28 DIAGNOSIS — Z95.828 PRESENCE OF OTHER VASCULAR IMPLANTS AND GRAFTS: Chronic | ICD-10-CM

## 2023-06-28 PROCEDURE — 11042 DBRDMT SUBQ TIS 1ST 20SQCM/<: CPT

## 2023-06-29 DIAGNOSIS — L97.422 NON-PRESSURE CHRONIC ULCER OF LEFT HEEL AND MIDFOOT WITH FAT LAYER EXPOSED: ICD-10-CM

## 2023-06-29 DIAGNOSIS — E11.621 TYPE 2 DIABETES MELLITUS WITH FOOT ULCER: ICD-10-CM

## 2023-06-29 DIAGNOSIS — M14.60 CHARCOT'S JOINT, UNSPECIFIED SITE: ICD-10-CM

## 2023-06-29 NOTE — PROCEDURE
[FreeTextEntry1] : dressings removed,wounds cleaned. left heel debrided of eschar. wound audrey. dressed with cyndi,4x4s,ab pad kurlex. splint refurbished and applied. wound to left proximal tibia dressed with bacitracin,xerofom,2x2,omnifix. right tibial wounds dressed with xeroform,2x2s,omnifix, kurlex, ace wrapped leg.right leg dresed daily. pt to return here next week.

## 2023-06-29 NOTE — PHYSICAL EXAM
[Foot Ulcer] : foot ulcer [Ankle Swelling (On Exam)] : present [Ankle Swelling On The Left] : of the left ankle [Ankle Swelling On The Right] : mild [FreeTextEntry3] : Diminished pulses B/L LE [de-identified] : Rocker bottom deformity L foot \par L 5th toe amp [FreeTextEntry1] : L Heel Ulcer, down to Subcutaneous Tissue, Size 2cm x1.1cm,  No erythema,  drainage, malodor\par Mild LLE swelling  [Diminished Throughout Right Foot] : diminished sensation with monofilament testing throughout right foot [Diminished Throughout Left Foot] : diminished sensation with monofilament testing throughout left foot

## 2023-06-29 NOTE — HISTORY OF PRESENT ILLNESS
[Wheelchair] : a wheelchair [FreeTextEntry1] : L Heel ulcer\par - Improvement with wound care, serial dbx \par - TCC \par \par  RLE Ulceration\par - Healed \par

## 2023-06-30 DIAGNOSIS — L97.422 NON-PRESSURE CHRONIC ULCER OF LEFT HEEL AND MIDFOOT WITH FAT LAYER EXPOSED: ICD-10-CM

## 2023-06-30 DIAGNOSIS — M14.60 CHARCOT'S JOINT, UNSPECIFIED SITE: ICD-10-CM

## 2023-06-30 DIAGNOSIS — I70.209 UNSPECIFIED ATHEROSCLEROSIS OF NATIVE ARTERIES OF EXTREMITIES, UNSPECIFIED EXTREMITY: ICD-10-CM

## 2023-06-30 DIAGNOSIS — E11.621 TYPE 2 DIABETES MELLITUS WITH FOOT ULCER: ICD-10-CM

## 2023-07-05 ENCOUNTER — OUTPATIENT (OUTPATIENT)
Dept: OUTPATIENT SERVICES | Facility: HOSPITAL | Age: 63
LOS: 1 days | End: 2023-07-05
Payer: MEDICARE

## 2023-07-05 ENCOUNTER — APPOINTMENT (OUTPATIENT)
Dept: PODIATRY | Facility: CLINIC | Age: 63
End: 2023-07-05
Payer: MEDICARE

## 2023-07-05 DIAGNOSIS — Z98.890 OTHER SPECIFIED POSTPROCEDURAL STATES: Chronic | ICD-10-CM

## 2023-07-05 DIAGNOSIS — Z00.00 ENCOUNTER FOR GENERAL ADULT MEDICAL EXAMINATION WITHOUT ABNORMAL FINDINGS: ICD-10-CM

## 2023-07-05 DIAGNOSIS — Z95.1 PRESENCE OF AORTOCORONARY BYPASS GRAFT: Chronic | ICD-10-CM

## 2023-07-05 DIAGNOSIS — I77.0 ARTERIOVENOUS FISTULA, ACQUIRED: Chronic | ICD-10-CM

## 2023-07-05 DIAGNOSIS — Z95.828 PRESENCE OF OTHER VASCULAR IMPLANTS AND GRAFTS: Chronic | ICD-10-CM

## 2023-07-05 PROCEDURE — 11042 DBRDMT SUBQ TIS 1ST 20SQCM/<: CPT | Mod: 59

## 2023-07-05 PROCEDURE — 29445 APPL RIGID TOT CNTC LEG CAST: CPT | Mod: LT,59

## 2023-07-05 PROCEDURE — 29445 APPL RIGID TOT CNTC LEG CAST: CPT | Mod: LT

## 2023-07-05 PROCEDURE — 11042 DBRDMT SUBQ TIS 1ST 20SQCM/<: CPT

## 2023-07-06 NOTE — HISTORY OF PRESENT ILLNESS
[Wheelchair] : a wheelchair [FreeTextEntry1] : L Heel ulcer\par - Improvement with wound care, serial dbx \par - TCC \par \par

## 2023-07-06 NOTE — PROCEDURE
[] : A well-padded total contact cast was applied to the left foot/ankle. [FreeTextEntry1] : dressings removed,wounds cleaned. left heel debrided of eschar. wound audrey. dressed with cyndi,4x4s,ab pad kurlex. TCC applied. wound to left proximal tibia dressed with bacitracin,xerofom,2x2,omnifix.

## 2023-07-06 NOTE — ASSESSMENT
[FreeTextEntry1] : Sharp Ex Dbx fibrotic tissue down and including subcutaneous tissue L foot using a dermal curet. SIze above\par TCC LLE \par RTO 1 week [Verbal] : verbal [Patient] : patient [Good - alert, interested, motivated] : Good - alert, interested, motivated

## 2023-07-06 NOTE — PHYSICAL EXAM
[Ankle Swelling (On Exam)] : present [Ankle Swelling On The Left] : of the left ankle [Ankle Swelling On The Right] : mild [FreeTextEntry3] : Diminished pulses B/L LE [de-identified] : Rocker bottom deformity L foot \par L 5th toe amp [Foot Ulcer] : foot ulcer [FreeTextEntry1] : L Heel Ulcer, down to Subcutaneous Tissue, Size 2cm x1.1cm,  No erythema,  drainage, malodor\par Mild LLE swelling  [Diminished Throughout Right Foot] : diminished sensation with monofilament testing throughout right foot [Diminished Throughout Left Foot] : diminished sensation with monofilament testing throughout left foot

## 2023-07-06 NOTE — ASU PREOP CHECKLIST - LOOSE TEETH
1. \"Have you been to the ER, urgent care clinic since your last visit? Hospitalized since your last visit? \"No    2. \"Have you seen or consulted any other health care providers outside of the [address] [place] [address] since your last visit? \" No    3. For patients aged 43-73: Has the patient had a colonoscopy / FIT/ Cologuard? Yes      If the patient is female:    4. For patients aged 43-66: Has the patient had a mammogram within the past 2 years? No      5. For patients aged 21-65: Has the patient had a pap smear?  No edema noted, intact peripheral pulses      Results  No results found for this visit on 07/06/23. ASSESSMENT and PLAN    ICD-10-CM    1. Type 2 diabetes mellitus with hyperglycemia, with long-term current use of insulin (HCC)  E11.65 metFORMIN (GLUCOPHAGE) 1000 MG tablet    Z79.4 SITagliptin (JANUVIA) 100 MG tablet      2. Essential (primary) hypertension  I10 losartan (COZAAR) 100 MG tablet      3. Other hyperlipidemia  E78.49       4. Generalized abdominal pain  R10.84       5. Menopausal and female climacteric states  N95.1       lab results and schedule of future lab studies reviewed with patient  reviewed diet, exercise and weight control  cardiovascular risk and specific lipid/LDL goals reviewed  reviewed medications and side effects in detail      I have discussed the diagnosis with the patient and the intended plan of care as seen in the above orders. The patient has received an after-visit summary and questions were answered concerning future plans. I have discussed medication, side effects, and warnings with the patient in detail. The patient verbalized understanding and is in agreement with the plan of care. The patient will contact the office with any additional concerns. I spent 30 minutes with this established patient. Eh Johns MD    PLEASE NOTE:   This document has been produced using voice recognition software.  Unrecognized errors in transcription may be present no

## 2023-07-07 DIAGNOSIS — I70.209 UNSPECIFIED ATHEROSCLEROSIS OF NATIVE ARTERIES OF EXTREMITIES, UNSPECIFIED EXTREMITY: ICD-10-CM

## 2023-07-07 DIAGNOSIS — E11.621 TYPE 2 DIABETES MELLITUS WITH FOOT ULCER: ICD-10-CM

## 2023-07-07 DIAGNOSIS — I73.9 PERIPHERAL VASCULAR DISEASE, UNSPECIFIED: ICD-10-CM

## 2023-07-07 DIAGNOSIS — L97.422 NON-PRESSURE CHRONIC ULCER OF LEFT HEEL AND MIDFOOT WITH FAT LAYER EXPOSED: ICD-10-CM

## 2023-07-12 ENCOUNTER — OUTPATIENT (OUTPATIENT)
Dept: OUTPATIENT SERVICES | Facility: HOSPITAL | Age: 63
LOS: 1 days | End: 2023-07-12
Payer: MEDICARE

## 2023-07-12 ENCOUNTER — APPOINTMENT (OUTPATIENT)
Dept: PODIATRY | Facility: CLINIC | Age: 63
End: 2023-07-12
Payer: MEDICARE

## 2023-07-12 DIAGNOSIS — Z95.1 PRESENCE OF AORTOCORONARY BYPASS GRAFT: Chronic | ICD-10-CM

## 2023-07-12 DIAGNOSIS — Z98.890 OTHER SPECIFIED POSTPROCEDURAL STATES: Chronic | ICD-10-CM

## 2023-07-12 DIAGNOSIS — Z95.828 PRESENCE OF OTHER VASCULAR IMPLANTS AND GRAFTS: Chronic | ICD-10-CM

## 2023-07-12 DIAGNOSIS — I77.0 ARTERIOVENOUS FISTULA, ACQUIRED: Chronic | ICD-10-CM

## 2023-07-12 DIAGNOSIS — Z00.00 ENCOUNTER FOR GENERAL ADULT MEDICAL EXAMINATION WITHOUT ABNORMAL FINDINGS: ICD-10-CM

## 2023-07-12 PROCEDURE — 11042 DBRDMT SUBQ TIS 1ST 20SQCM/<: CPT | Mod: LT

## 2023-07-18 NOTE — ED ADULT TRIAGE NOTE - AS HEIGHT TYPE
actual Terbinafine Counseling: Patient counseling regarding adverse effects of terbinafine including but not limited to headache, diarrhea, rash, upset stomach, liver function test abnormalities, itching, taste/smell disturbance, nausea, abdominal pain, and flatulence.  There is a rare possibility of liver failure that can occur when taking terbinafine.  The patient understands that a baseline LFT and kidney function test may be required. The patient verbalized understanding of the proper use and possible adverse effects of terbinafine.  All of the patient's questions and concerns were addressed.

## 2023-07-19 ENCOUNTER — APPOINTMENT (OUTPATIENT)
Dept: PODIATRY | Facility: CLINIC | Age: 63
End: 2023-07-19
Payer: MEDICARE

## 2023-07-19 ENCOUNTER — OUTPATIENT (OUTPATIENT)
Dept: OUTPATIENT SERVICES | Facility: HOSPITAL | Age: 63
LOS: 1 days | End: 2023-07-19
Payer: MEDICARE

## 2023-07-19 DIAGNOSIS — Z95.1 PRESENCE OF AORTOCORONARY BYPASS GRAFT: Chronic | ICD-10-CM

## 2023-07-19 DIAGNOSIS — I77.0 ARTERIOVENOUS FISTULA, ACQUIRED: Chronic | ICD-10-CM

## 2023-07-19 DIAGNOSIS — Z98.890 OTHER SPECIFIED POSTPROCEDURAL STATES: Chronic | ICD-10-CM

## 2023-07-19 DIAGNOSIS — Z95.828 PRESENCE OF OTHER VASCULAR IMPLANTS AND GRAFTS: Chronic | ICD-10-CM

## 2023-07-19 DIAGNOSIS — Z00.00 ENCOUNTER FOR GENERAL ADULT MEDICAL EXAMINATION WITHOUT ABNORMAL FINDINGS: ICD-10-CM

## 2023-07-19 PROCEDURE — 11721 DEBRIDE NAIL 6 OR MORE: CPT | Mod: XU

## 2023-07-19 PROCEDURE — 11056 PARNG/CUTG B9 HYPRKR LES 2-4: CPT

## 2023-07-19 PROCEDURE — 11042 DBRDMT SUBQ TIS 1ST 20SQCM/<: CPT | Mod: XU

## 2023-07-19 PROCEDURE — 99213 OFFICE O/P EST LOW 20 MIN: CPT | Mod: 25

## 2023-07-19 PROCEDURE — 29445 APPL RIGID TOT CNTC LEG CAST: CPT | Mod: LT

## 2023-07-19 PROCEDURE — 11042 DBRDMT SUBQ TIS 1ST 20SQCM/<: CPT | Mod: 59

## 2023-07-19 PROCEDURE — 11721 DEBRIDE NAIL 6 OR MORE: CPT | Mod: 59

## 2023-07-19 NOTE — HISTORY OF PRESENT ILLNESS
[Wheelchair] : a wheelchair [FreeTextEntry1] : L Heel ulcer\par - Improvement with wound care, serial dbx \par - TCC \par \par LLE swelling\par Diabetic Foot Care\par

## 2023-07-19 NOTE — PROCEDURE
[FreeTextEntry1] : wounds cleaned and debrided of bio film  to left heel, right lateral 1st met debrided of callous,scored to promote healind. calf wounds and 1st met dressed with bacitracin,2x2s,omnifix. left heel dressed with silvadene,4x4s,ab pad kurlex. splint reapplied. left knee wound audrey,dressed with duoderm,omnifix. pt to return next week.

## 2023-07-19 NOTE — END OF VISIT
[] : Resident [Resident] : Resident [FreeTextEntry3] : left heel ulcer. no acute signs of infection. local wound care as above [FreeTextEntry2] : -left foot ulcer  excisionally debrided of fibrotic/devitalized tissue down to subcutaneous layer. Performed under sterile conditions with curette and scalpel.\par

## 2023-07-19 NOTE — PHYSICAL EXAM
[Ankle Swelling (On Exam)] : present [Ankle Swelling On The Left] : moderate [FreeTextEntry3] : Diminished pulses B/L LE [de-identified] : Rocker bottom deformity L foot \par L 5th toe amp [Foot Ulcer] : foot ulcer [FreeTextEntry1] : L Heel Ulcer, down to Subcutaneous Tissue, Size 2cm x1.1cm,  No erythema, no drainage, no malodor\par LLE swelling \par Elongated nails x9\par Calluses X2  [Diminished Throughout Right Foot] : diminished sensation with monofilament testing throughout right foot [Diminished Throughout Left Foot] : diminished sensation with monofilament testing throughout left foot

## 2023-07-19 NOTE — ASSESSMENT
[FreeTextEntry1] : Sharp Ex Dbx fibrotic tissue down and including subcutaneous tissue L foot using a dermal curet. SIze above\par TCC LLE \par Dbx of Nails x9\par Dbx of Calluses x2\par FWB in a regular shoe R foot\par FWB LLE with a TCC \par Compression B/L LE form swelling control\par F/u with PCP regarding LE Swelling \par RTO 1 week [Verbal] : verbal [Patient] : patient [Good - alert, interested, motivated] : Good - alert, interested, motivated

## 2023-07-20 DIAGNOSIS — L97.422 NON-PRESSURE CHRONIC ULCER OF LEFT HEEL AND MIDFOOT WITH FAT LAYER EXPOSED: ICD-10-CM

## 2023-07-20 DIAGNOSIS — E11.621 TYPE 2 DIABETES MELLITUS WITH FOOT ULCER: ICD-10-CM

## 2023-07-21 DIAGNOSIS — E11.621 TYPE 2 DIABETES MELLITUS WITH FOOT ULCER: ICD-10-CM

## 2023-07-21 DIAGNOSIS — M14.60 CHARCOT'S JOINT, UNSPECIFIED SITE: ICD-10-CM

## 2023-07-21 DIAGNOSIS — L97.422 NON-PRESSURE CHRONIC ULCER OF LEFT HEEL AND MIDFOOT WITH FAT LAYER EXPOSED: ICD-10-CM

## 2023-07-21 DIAGNOSIS — M79.89 OTHER SPECIFIED SOFT TISSUE DISORDERS: ICD-10-CM

## 2023-07-21 DIAGNOSIS — L60.2 ONYCHOGRYPHOSIS: ICD-10-CM

## 2023-07-21 DIAGNOSIS — L84 CORNS AND CALLOSITIES: ICD-10-CM

## 2023-07-21 DIAGNOSIS — E11.42 TYPE 2 DIABETES MELLITUS WITH DIABETIC POLYNEUROPATHY: ICD-10-CM

## 2023-07-21 DIAGNOSIS — I70.209 UNSPECIFIED ATHEROSCLEROSIS OF NATIVE ARTERIES OF EXTREMITIES, UNSPECIFIED EXTREMITY: ICD-10-CM

## 2023-07-21 DIAGNOSIS — L60.3 NAIL DYSTROPHY: ICD-10-CM

## 2023-07-26 ENCOUNTER — APPOINTMENT (OUTPATIENT)
Dept: VASCULAR SURGERY | Facility: CLINIC | Age: 63
End: 2023-07-26
Payer: MEDICARE

## 2023-07-26 ENCOUNTER — APPOINTMENT (OUTPATIENT)
Dept: PODIATRY | Facility: CLINIC | Age: 63
End: 2023-07-26

## 2023-07-26 VITALS — BODY MASS INDEX: 35 KG/M2 | HEIGHT: 71 IN | WEIGHT: 250 LBS

## 2023-07-26 VITALS
DIASTOLIC BLOOD PRESSURE: 80 MMHG | HEIGHT: 71 IN | HEART RATE: 80 BPM | SYSTOLIC BLOOD PRESSURE: 157 MMHG | BODY MASS INDEX: 34.87 KG/M2

## 2023-07-26 PROCEDURE — 99212 OFFICE O/P EST SF 10 MIN: CPT

## 2023-07-26 PROCEDURE — 93880 EXTRACRANIAL BILAT STUDY: CPT

## 2023-07-27 ENCOUNTER — APPOINTMENT (OUTPATIENT)
Dept: PODIATRY | Facility: CLINIC | Age: 63
End: 2023-07-27

## 2023-08-02 ENCOUNTER — APPOINTMENT (OUTPATIENT)
Dept: PODIATRY | Facility: CLINIC | Age: 63
End: 2023-08-02
Payer: MEDICARE

## 2023-08-02 ENCOUNTER — OUTPATIENT (OUTPATIENT)
Dept: OUTPATIENT SERVICES | Facility: HOSPITAL | Age: 63
LOS: 1 days | End: 2023-08-02
Payer: MEDICARE

## 2023-08-02 DIAGNOSIS — Z00.00 ENCOUNTER FOR GENERAL ADULT MEDICAL EXAMINATION WITHOUT ABNORMAL FINDINGS: ICD-10-CM

## 2023-08-02 DIAGNOSIS — Z98.890 OTHER SPECIFIED POSTPROCEDURAL STATES: Chronic | ICD-10-CM

## 2023-08-02 DIAGNOSIS — Z95.1 PRESENCE OF AORTOCORONARY BYPASS GRAFT: Chronic | ICD-10-CM

## 2023-08-02 DIAGNOSIS — Z95.828 PRESENCE OF OTHER VASCULAR IMPLANTS AND GRAFTS: Chronic | ICD-10-CM

## 2023-08-02 DIAGNOSIS — I77.0 ARTERIOVENOUS FISTULA, ACQUIRED: Chronic | ICD-10-CM

## 2023-08-02 PROCEDURE — 11042 DBRDMT SUBQ TIS 1ST 20SQCM/<: CPT

## 2023-08-03 NOTE — ASSESSMENT
[FreeTextEntry1] : Sharp Ex Dbx fibrotic tissue down and including subcutaneous tissue L foot using a dermal curet. SIze above TCC LLE  FWB in a regular shoe R foot FWB LLE with a TCC  RTO 1 week [Verbal] : verbal [Patient] : patient [Good - alert, interested, motivated] : Good - alert, interested, motivated

## 2023-08-03 NOTE — HISTORY OF PRESENT ILLNESS
[Wheelchair] : a wheelchair [FreeTextEntry1] : L Heel ulcer - Improvement with wound care, serial dbx  - TCC

## 2023-08-03 NOTE — PHYSICAL EXAM
[Ankle Swelling (On Exam)] : present [Ankle Swelling On The Left] : moderate [FreeTextEntry3] : Diminished pulses B/L LE [de-identified] : Rocker bottom deformity L foot  L 5th toe amp [Foot Ulcer] : foot ulcer [FreeTextEntry1] : L Heel Ulcer, down to Subcutaneous Tissue, Size 2cm x1.1cm,  No erythema, no drainage, no malodor [Diminished Throughout Right Foot] : diminished sensation with monofilament testing throughout right foot [Diminished Throughout Left Foot] : diminished sensation with monofilament testing throughout left foot

## 2023-08-03 NOTE — PROCEDURE
[FreeTextEntry1] : dressings removed,wounds cleaned. left heel debrided of eschar. wound audrey. dressed with cyndi,4x4s,ab pad kurlex. wound to left proximal tibia dressed with bacitracin,xerofom,2x2,omnifix.

## 2023-08-04 DIAGNOSIS — E11.621 TYPE 2 DIABETES MELLITUS WITH FOOT ULCER: ICD-10-CM

## 2023-08-04 DIAGNOSIS — M14.60 CHARCOT'S JOINT, UNSPECIFIED SITE: ICD-10-CM

## 2023-08-04 DIAGNOSIS — L97.422 NON-PRESSURE CHRONIC ULCER OF LEFT HEEL AND MIDFOOT WITH FAT LAYER EXPOSED: ICD-10-CM

## 2023-08-04 DIAGNOSIS — I70.209 UNSPECIFIED ATHEROSCLEROSIS OF NATIVE ARTERIES OF EXTREMITIES, UNSPECIFIED EXTREMITY: ICD-10-CM

## 2023-08-07 NOTE — PRE-ANESTHESIA EVALUATION ADULT - NS MD HP INPLANTS MED DEV
MILEY MARES  85y, Male  Allergy: No Known Allergies    CHIEF COMPLAINT: DKA/HHS (07 Aug 2023 09:33)    HPI:  HPI:  84 y/o M w/ PMH of DM, HTN coming from home with complaint of decreased appetite, increased urinary output and craving sweets x 2 weeks. Daughter is caretaker, states patient behavior has changed. Within the last 2 weeks, He is less active, requesting more coca cola and sugar. Pt noted to be hypotensive upon arrival.     ED vitals:  BP 74/ 50, HR 87, Temp 97.2F, satting 95% on room air  Labs significant for WBC 14K, Na 123 (corrected 141), Cr 2.4, AG 23, BHB 5.1. VBG pH 7.19, Lactate 5.8, K 8.6, pCO2 39  EKG tachycardia, bifascicular block, RBBB  CXR unremarkable  CT A/P: Extensive coronary atherosclerosis. Dependent atelectasis at the right base. Stable aneurysmal dilatation of the descending thoracic aorta, 3.3 cm. Hepatic cysts. Questionable sludge within the gallbladder. Unchanged 1 cm cystic lesion in the pancreatic body      s/p calcium gluconate 1g, Lasix 40mg push x1, barcarb 50meq, started on insulin drip at 7 units/hr.   Admitted to MICU for DKA/HHS.     (04 Aug 2023 15:16)      Infectious Diseases History:  Old Micro Data/Cultures:     FAMILY HISTORY:  No pertinent family history in first degree relatives      PAST MEDICAL & SURGICAL HISTORY:  HTN (hypertension)      Gout      BPH (benign prostatic hyperplasia)      Parkinson disease      HLD (hyperlipidemia)      Smoker within last 12 months      Diabetes mellitus      No significant past surgical history          SOCIAL HISTORY  Social History:  Active smoker. No alcohol use. No illicit drug use. (09 Oct 2022 00:51)      ROS  Unable to obtain due to pt's status.     VITALS:  T(F): 97.9, Max: 102.4 (08-06-23 @ 11:00)  HR: 114  BP: 93/52  RR: 30Vital Signs Last 24 Hrs  T(C): 36.6 (07 Aug 2023 08:00), Max: 39.1 (06 Aug 2023 11:00)  T(F): 97.9 (07 Aug 2023 08:00), Max: 102.4 (06 Aug 2023 11:00)  HR: 114 (07 Aug 2023 09:15) (91 - 130)  BP: 93/52 (07 Aug 2023 09:15) (81/52 - 145/73)  BP(mean): 66 (07 Aug 2023 09:15) (61 - 101)  RR: 30 (07 Aug 2023 09:15) (17 - 35)  SpO2: 95% (07 Aug 2023 09:15) (85% - 100%)    Parameters below as of 07 Aug 2023 09:15  Patient On (Oxygen Delivery Method): room air        PHYSICAL EXAM:  Gen: lying in bed in apparent distress  HEENT: poor oral care  Lungs: crackles b/l  Abdomen: Soft, nondistended  Ext: Warm, well perfused  Neuro: non focal  Lines: no phlebitis    TESTS & MEASUREMENTS:                        6.6    12.34 )-----------( 122      ( 07 Aug 2023 05:10 )             19.8     08-07    147<H>  |  117<H>  |  29<H>  ----------------------------<  46<LL>  3.6   |  23  |  1.2    Ca    7.3<L>      07 Aug 2023 05:10  Phos  2.2     08-07  Mg     1.5     08-07    TPro  4.2<L>  /  Alb  2.2<L>  /  TBili  0.3  /  DBili  x   /  AST  41  /  ALT  16  /  AlkPhos  66  08-07      LIVER FUNCTIONS - ( 07 Aug 2023 05:10 )  Alb: 2.2 g/dL / Pro: 4.2 g/dL / ALK PHOS: 66 U/L / ALT: 16 U/L / AST: 41 U/L / GGT: x           Urinalysis Basic - ( 07 Aug 2023 05:10 )    Color: x / Appearance: x / SG: x / pH: x  Gluc: 46 mg/dL / Ketone: x  / Bili: x / Urobili: x   Blood: x / Protein: x / Nitrite: x   Leuk Esterase: x / RBC: x / WBC x   Sq Epi: x / Non Sq Epi: x / Bacteria: x        Culture - Blood (collected 08-05-23 @ 07:10)  Source: .Blood Blood-Peripheral  Preliminary Report (08-06-23 @ 19:02):    No growth at 24 hours    Culture - Urine (collected 08-04-23 @ 14:42)  Source: Clean Catch Clean Catch (Midstream)  Final Report (08-05-23 @ 19:52):    No growth    Culture - Blood (collected 08-04-23 @ 11:20)  Source: .Blood Blood-Peripheral  Gram Stain (08-06-23 @ 02:58):    Growth in anaerobic bottle: Gram Negative Rods  Preliminary Report (08-06-23 @ 15:49):    Growth in anaerobic bottle: Gram Negative Rods Most closely resembling    Hungatella species    Direct identification is available within approximately 3-5    hours either by Blood Panel Multiplexed PCR or Direct    MALDI-TOF. Details: [email or web address]  Organism: Blood Culture PCR (08-06-23 @ 04:32)  Organism: Blood Culture PCR (08-06-23 @ 04:32)      Method Type: PCR      -  Blood PCR Panel: NEG    Culture - Blood (collected 08-04-23 @ 11:20)  Source: .Blood Blood-Peripheral  Preliminary Report (08-06-23 @ 23:02):    No growth at 48 Hours        Lactate, Blood: 3.4 mmol/L (08-06-23 @ 11:17)  Lactate, Blood: 3.7 mmol/L (08-04-23 @ 23:05)  Lactate, Blood: 2.9 mmol/L (08-04-23 @ 14:44)  Blood Gas Venous - Lactate: 7.40 mmol/L (08-04-23 @ 13:34)  Blood Gas Venous - Lactate: 5.80 mmol/L (08-04-23 @ 10:47)      INFECTIOUS DISEASES TESTING      RADIOLOGY & ADDITIONAL TESTS:  I have personally reviewed the last available Chest xray  CXR  Xray Chest 1 View AP/PA:   ACC: [id number] EXAM:  XR CHEST 1 VIEW   ORDERED BY: LAZ REYNA     PROCEDURE DATE:  08/05/2023        INTERPRETATION:  CLINICAL HISTORY / REASON FOR EXAM: Line placement.    COMPARISON: Chest radiograph from August 4, 2023.    TECHNIQUE/POSITIONING: Satisfactory. Single image, AP portable chest   radiograph.    FINDINGS:    SUPPORT DEVICES: Interval placement of enteric tube, with distal tip   overlying the stomach.    CARDIAC/MEDIASTINUM/HILUM: Enlarged aortic knob, stable.    LUNG PARENCHYMA/PLEURA: No focal consolidation or pleural effusion. No   pneumothorax.    SKELETON/SOFT TISSUES: Unchanged.      IMPRESSION:    Interval placement of enteric catheter in satisfactory position.    --- End of Report ---            LUCIO HOOVER MD;Attending Radiologist  This document has been electronically signed. Aug  5 2023  8:25PM (08-05-23 @ 15:50)      CT  CT Abdomen and Pelvis No Cont:   ACC: [id number] EXAM:  CT ABDOMEN AND PELVIS   ORDERED BY: RADHA JAUREGUI     PROCEDURE DATE:  08/04/2023          INTERPRETATION:  CLINICAL STATEMENT: Intra-abdominal abscess. Decreased   appetite. Hypotension.    TECHNIQUE: Contiguous axial CT imageswere obtained from the lower chest   to the pubic symphysis without intravenous contrast.  Oral contrast was   not administered.  Reformatted images in the coronal and sagittal planes   were acquired.    COMPARISON CT: 11/17/2022.    OTHER STUDIES USED FOR CORRELATION: None.      FINDINGS:    LOWER CHEST: Extensive coronary atherosclerosis. Dependent atelectasis at   the right base. Stable aneurysmal dilatation of the descending thoracic   aorta, 3.3 cm.    HEPATOBILIARY: Hepatic cysts. Questionable sludge within the gallbladder.    SPLEEN: Unremarkable.    PANCREAS: Unchanged 1 cm cystic lesion in the pancreatic body (series   4/102).    ADRENAL GLANDS: Stable nodular thickening of bilateral adrenal glands.    KIDNEYS: Unremarkable. No hydronephrosis or nephroureterolithiasis.    ABDOMINOPELVIC NODES: Unremarkable.    PELVIC ORGANS: Unremarkable.    PERITONEUM/MESENTERY/BOWEL: No bowel obstruction or inflammation. Normal   appendix. No free air or ascites. No abscess formation.    BONES/SOFT TISSUES: Osteopenia with degenerative change in the spine and   hips.    OTHER: Aneurysmal dilatation of the infrarenal abdominal aorta, stable   from 11/17/2022 measuring 5.2 x 4.9 cm; heterogeneity of the neural   thrombus, and likely not significantly changed given differences in   technique. Aneurysmal dilatation at the origin of bilateral renal   arteries, not significantly changed. Diffuse atherosclerosis.      IMPRESSION:    1. No acute abnormality in the abdomen or pelvis.  2. Aneurysmal dilatation throughout the aorta and origins of bilateral   renal arteries, not significantly changed.  3. Incidental findings as above.    --- End of Report ---        KAITLIN BYRD MD; Attending Radiologist  This document has been electronically signed. Aug  4 2023  2:29PM (08-04-23 @ 12:56)      CARDIOLOGY TESTING  12 Lead ECG:   Ventricular Rate 106 BPM    Atrial Rate 106 BPM    P-R Interval 152 ms    QRS Duration 126 ms    Q-T Interval 400 ms    QTC Calculation(Bazett) 531 ms    P Axis 34 degrees    R Axis -50 degrees    T Axis -18 degrees    Diagnosis Line Sinus tachycardia  Right bundle branch block  Left anterior fascicular block  *** Bifascicular block ***  Moderate voltage criteria for LVH, may be normal variant      Abnormal ECG    Confirmed by RUDY FLOR MD ([id number]) on 8/4/2023 11:24:55 AM (08-04-23 @ 10:57)      All available historical records have been reviewed    MEDICATIONS  allopurinol 300  atorvastatin 10  chlorhexidine 2% Cloths 1  dextrose 5%. 1000  dextrose 50% Injectable 25  glucagon  Injectable 1  insulin glargine Injectable (LANTUS) 15  insulin lispro (ADMELOG) corrective regimen sliding scale   insulin lispro Injectable (ADMELOG) 5  lactated ringers Bolus 500  magnesium sulfate  IVPB 2  meropenem  IVPB 1000  norepinephrine Infusion 0.05  pantoprazole    Tablet 40  vancomycin  IVPB 1000      ANTIBIOTICS:  meropenem  IVPB 1000 milliGRAM(s) IV Intermittent every 8 hours  vancomycin  IVPB 1000 milliGRAM(s) IV Intermittent every 12 hours      All available historical data has been reviewed   MILEY MARES  85y, Male  Allergy: No Known Allergies    CHIEF COMPLAINT: DKA/HHS (07 Aug 2023 09:33)    HPI:  HPI:  84 y/o M w/ PMH of DM, HTN coming from home with complaint of decreased appetite, increased urinary output and craving sweets x 2 weeks. Daughter is caretaker, states patient behavior has changed. Within the last 2 weeks, He is less active, requesting more coca cola and sugar. Pt noted to be hypotensive upon arrival.     ED vitals:  BP 74/ 50, HR 87, Temp 97.2F, satting 95% on room air  Labs significant for WBC 14K, Na 123 (corrected 141), Cr 2.4, AG 23, BHB 5.1. VBG pH 7.19, Lactate 5.8, K 8.6, pCO2 39  EKG tachycardia, bifascicular block, RBBB  CXR unremarkable  CT A/P: Extensive coronary atherosclerosis. Dependent atelectasis at the right base. Stable aneurysmal dilatation of the descending thoracic aorta, 3.3 cm. Hepatic cysts. Questionable sludge within the gallbladder. Unchanged 1 cm cystic lesion in the pancreatic body      s/p calcium gluconate 1g, Lasix 40mg push x1, barcarb 50meq, started on insulin drip at 7 units/hr.   Admitted to MICU for DKA/HHS.     (04 Aug 2023 15:16)      Infectious Diseases History:  Old Micro Data/Cultures:     FAMILY HISTORY:  No pertinent family history in first degree relatives      PAST MEDICAL & SURGICAL HISTORY:  HTN (hypertension)      Gout      BPH (benign prostatic hyperplasia)      Parkinson disease      HLD (hyperlipidemia)      Smoker within last 12 months      Diabetes mellitus      No significant past surgical history          SOCIAL HISTORY  Social History:  Active smoker. No alcohol use. No illicit drug use. (09 Oct 2022 00:51)      ROS  Unable to obtain due to pt's altered status.     VITALS:  T(F): 97.9, Max: 102.4 (08-06-23 @ 11:00)  HR: 114  BP: 93/52  RR: 30Vital Signs Last 24 Hrs  T(C): 36.6 (07 Aug 2023 08:00), Max: 39.1 (06 Aug 2023 11:00)  T(F): 97.9 (07 Aug 2023 08:00), Max: 102.4 (06 Aug 2023 11:00)  HR: 114 (07 Aug 2023 09:15) (91 - 130)  BP: 93/52 (07 Aug 2023 09:15) (81/52 - 145/73)  BP(mean): 66 (07 Aug 2023 09:15) (61 - 101)  RR: 30 (07 Aug 2023 09:15) (17 - 35)  SpO2: 95% (07 Aug 2023 09:15) (85% - 100%)    Parameters below as of 07 Aug 2023 09:15  Patient On (Oxygen Delivery Method): room air        PHYSICAL EXAM:  Gen: lying in bed in apparent distress, altered  HEENT: poor oral care  Lungs: crackles b/l  Abdomen: Soft, nondistended  Ext: Warm, well perfused  Neuro: non focal  Lines: no phlebitis    TESTS & MEASUREMENTS:                        6.6    12.34 )-----------( 122      ( 07 Aug 2023 05:10 )             19.8     08-07    147<H>  |  117<H>  |  29<H>  ----------------------------<  46<LL>  3.6   |  23  |  1.2    Ca    7.3<L>      07 Aug 2023 05:10  Phos  2.2     08-07  Mg     1.5     08-07    TPro  4.2<L>  /  Alb  2.2<L>  /  TBili  0.3  /  DBili  x   /  AST  41  /  ALT  16  /  AlkPhos  66  08-07      LIVER FUNCTIONS - ( 07 Aug 2023 05:10 )  Alb: 2.2 g/dL / Pro: 4.2 g/dL / ALK PHOS: 66 U/L / ALT: 16 U/L / AST: 41 U/L / GGT: x           Urinalysis Basic - ( 07 Aug 2023 05:10 )    Color: x / Appearance: x / SG: x / pH: x  Gluc: 46 mg/dL / Ketone: x  / Bili: x / Urobili: x   Blood: x / Protein: x / Nitrite: x   Leuk Esterase: x / RBC: x / WBC x   Sq Epi: x / Non Sq Epi: x / Bacteria: x        Culture - Blood (collected 08-05-23 @ 07:10)  Source: .Blood Blood-Peripheral  Preliminary Report (08-06-23 @ 19:02):    No growth at 24 hours    Culture - Urine (collected 08-04-23 @ 14:42)  Source: Clean Catch Clean Catch (Midstream)  Final Report (08-05-23 @ 19:52):    No growth    Culture - Blood (collected 08-04-23 @ 11:20)  Source: .Blood Blood-Peripheral  Gram Stain (08-06-23 @ 02:58):    Growth in anaerobic bottle: Gram Negative Rods  Preliminary Report (08-06-23 @ 15:49):    Growth in anaerobic bottle: Gram Negative Rods Most closely resembling    Hungatella species    Direct identification is available within approximately 3-5    hours either by Blood Panel Multiplexed PCR or Direct    MALDI-TOF. Details: [email or web address]  Organism: Blood Culture PCR (08-06-23 @ 04:32)  Organism: Blood Culture PCR (08-06-23 @ 04:32)      Method Type: PCR      -  Blood PCR Panel: NEG    Culture - Blood (collected 08-04-23 @ 11:20)  Source: .Blood Blood-Peripheral  Preliminary Report (08-06-23 @ 23:02):    No growth at 48 Hours        Lactate, Blood: 3.4 mmol/L (08-06-23 @ 11:17)  Lactate, Blood: 3.7 mmol/L (08-04-23 @ 23:05)  Lactate, Blood: 2.9 mmol/L (08-04-23 @ 14:44)  Blood Gas Venous - Lactate: 7.40 mmol/L (08-04-23 @ 13:34)  Blood Gas Venous - Lactate: 5.80 mmol/L (08-04-23 @ 10:47)      INFECTIOUS DISEASES TESTING      RADIOLOGY & ADDITIONAL TESTS:  I have personally reviewed the last available Chest xray  CXR  Xray Chest 1 View AP/PA:   ACC: [id number] EXAM:  XR CHEST 1 VIEW   ORDERED BY: LAZ REYNA     PROCEDURE DATE:  08/05/2023        INTERPRETATION:  CLINICAL HISTORY / REASON FOR EXAM: Line placement.    COMPARISON: Chest radiograph from August 4, 2023.    TECHNIQUE/POSITIONING: Satisfactory. Single image, AP portable chest   radiograph.    FINDINGS:    SUPPORT DEVICES: Interval placement of enteric tube, with distal tip   overlying the stomach.    CARDIAC/MEDIASTINUM/HILUM: Enlarged aortic knob, stable.    LUNG PARENCHYMA/PLEURA: No focal consolidation or pleural effusion. No   pneumothorax.    SKELETON/SOFT TISSUES: Unchanged.      IMPRESSION:    Interval placement of enteric catheter in satisfactory position.    --- End of Report ---            LUCIO HOOVER MD;Attending Radiologist  This document has been electronically signed. Aug  5 2023  8:25PM (08-05-23 @ 15:50)      CT  CT Abdomen and Pelvis No Cont:   ACC: [id number] EXAM:  CT ABDOMEN AND PELVIS   ORDERED BY: RADHA JAUREGUI     PROCEDURE DATE:  08/04/2023          INTERPRETATION:  CLINICAL STATEMENT: Intra-abdominal abscess. Decreased   appetite. Hypotension.    TECHNIQUE: Contiguous axial CT imageswere obtained from the lower chest   to the pubic symphysis without intravenous contrast.  Oral contrast was   not administered.  Reformatted images in the coronal and sagittal planes   were acquired.    COMPARISON CT: 11/17/2022.    OTHER STUDIES USED FOR CORRELATION: None.      FINDINGS:    LOWER CHEST: Extensive coronary atherosclerosis. Dependent atelectasis at   the right base. Stable aneurysmal dilatation of the descending thoracic   aorta, 3.3 cm.    HEPATOBILIARY: Hepatic cysts. Questionable sludge within the gallbladder.    SPLEEN: Unremarkable.    PANCREAS: Unchanged 1 cm cystic lesion in the pancreatic body (series   4/102).    ADRENAL GLANDS: Stable nodular thickening of bilateral adrenal glands.    KIDNEYS: Unremarkable. No hydronephrosis or nephroureterolithiasis.    ABDOMINOPELVIC NODES: Unremarkable.    PELVIC ORGANS: Unremarkable.    PERITONEUM/MESENTERY/BOWEL: No bowel obstruction or inflammation. Normal   appendix. No free air or ascites. No abscess formation.    BONES/SOFT TISSUES: Osteopenia with degenerative change in the spine and   hips.    OTHER: Aneurysmal dilatation of the infrarenal abdominal aorta, stable   from 11/17/2022 measuring 5.2 x 4.9 cm; heterogeneity of the neural   thrombus, and likely not significantly changed given differences in   technique. Aneurysmal dilatation at the origin of bilateral renal   arteries, not significantly changed. Diffuse atherosclerosis.      IMPRESSION:    1. No acute abnormality in the abdomen or pelvis.  2. Aneurysmal dilatation throughout the aorta and origins of bilateral   renal arteries, not significantly changed.  3. Incidental findings as above.    --- End of Report ---        KAITLIN BYRD MD; Attending Radiologist  This document has been electronically signed. Aug  4 2023  2:29PM (08-04-23 @ 12:56)      CARDIOLOGY TESTING  12 Lead ECG:   Ventricular Rate 106 BPM    Atrial Rate 106 BPM    P-R Interval 152 ms    QRS Duration 126 ms    Q-T Interval 400 ms    QTC Calculation(Bazett) 531 ms    P Axis 34 degrees    R Axis -50 degrees    T Axis -18 degrees    Diagnosis Line Sinus tachycardia  Right bundle branch block  Left anterior fascicular block  *** Bifascicular block ***  Moderate voltage criteria for LVH, may be normal variant      Abnormal ECG    Confirmed by RUDY FLOR MD ([id number]) on 8/4/2023 11:24:55 AM (08-04-23 @ 10:57)      All available historical records have been reviewed    MEDICATIONS  allopurinol 300  atorvastatin 10  chlorhexidine 2% Cloths 1  dextrose 5%. 1000  dextrose 50% Injectable 25  glucagon  Injectable 1  insulin glargine Injectable (LANTUS) 15  insulin lispro (ADMELOG) corrective regimen sliding scale   insulin lispro Injectable (ADMELOG) 5  lactated ringers Bolus 500  magnesium sulfate  IVPB 2  meropenem  IVPB 1000  norepinephrine Infusion 0.05  pantoprazole    Tablet 40  vancomycin  IVPB 1000      ANTIBIOTICS:  meropenem  IVPB 1000 milliGRAM(s) IV Intermittent every 8 hours  vancomycin  IVPB 1000 milliGRAM(s) IV Intermittent every 12 hours      All available historical data has been reviewed   MILEY MARES  85y, Male  Allergy: No Known Allergies    CHIEF COMPLAINT: DKA/HHS (07 Aug 2023 09:33)    HPI:  HPI:  86 y/o M w/ PMH of DM, HTN coming from home with complaint of decreased appetite, increased urinary output and craving sweets x 2 weeks. Daughter is caretaker, states patient behavior has changed. Within the last 2 weeks, He is less active, requesting more coca cola and sugar. Pt noted to be hypotensive upon arrival.     ED vitals:  BP 74/ 50, HR 87, Temp 97.2F, satting 95% on room air  Labs significant for WBC 14K, Na 123 (corrected 141), Cr 2.4, AG 23, BHB 5.1. VBG pH 7.19, Lactate 5.8, K 8.6, pCO2 39  EKG tachycardia, bifascicular block, RBBB  CXR unremarkable  CT A/P: Extensive coronary atherosclerosis. Dependent atelectasis at the right base. Stable aneurysmal dilatation of the descending thoracic aorta, 3.3 cm. Hepatic cysts. Questionable sludge within the gallbladder. Unchanged 1 cm cystic lesion in the pancreatic body      s/p calcium gluconate 1g, Lasix 40mg push x1, barcarb 50meq, started on insulin drip at 7 units/hr.   Admitted to MICU for DKA/HHS.     (04 Aug 2023 15:16)      Infectious Diseases History:  Old Micro Data/Cultures:     FAMILY HISTORY:  No pertinent family history in first degree relatives      PAST MEDICAL & SURGICAL HISTORY:  HTN (hypertension)      Gout      BPH (benign prostatic hyperplasia)      Parkinson disease      HLD (hyperlipidemia)      Smoker within last 12 months      Diabetes mellitus      No significant past surgical history          SOCIAL HISTORY  Social History:  Active smoker. No alcohol use. No illicit drug use. (09 Oct 2022 00:51)      ROS  Unable to obtain due to pt's altered status.     VITALS:  T(F): 97.9, Max: 102.4 (08-06-23 @ 11:00)  HR: 114  BP: 93/52  RR: 30Vital Signs Last 24 Hrs  T(C): 36.6 (07 Aug 2023 08:00), Max: 39.1 (06 Aug 2023 11:00)  T(F): 97.9 (07 Aug 2023 08:00), Max: 102.4 (06 Aug 2023 11:00)  HR: 114 (07 Aug 2023 09:15) (91 - 130)  BP: 93/52 (07 Aug 2023 09:15) (81/52 - 145/73)  BP(mean): 66 (07 Aug 2023 09:15) (61 - 101)  RR: 30 (07 Aug 2023 09:15) (17 - 35)  SpO2: 95% (07 Aug 2023 09:15) (85% - 100%)    Parameters below as of 07 Aug 2023 09:15  Patient On (Oxygen Delivery Method): room air        PHYSICAL EXAM:  Gen: lying in bed in apparent distress, altered  HEENT: poor oral care  Lungs: crackles b/l  Abdomen: Soft, nondistended, tender to palpation  Ext: Warm, well perfused  Neuro: non focal  Lines: bandage placed over R forearm due to infiltrated IV lines     TESTS & MEASUREMENTS:                        6.6    12.34 )-----------( 122      ( 07 Aug 2023 05:10 )             19.8     08-07    147<H>  |  117<H>  |  29<H>  ----------------------------<  46<LL>  3.6   |  23  |  1.2    Ca    7.3<L>      07 Aug 2023 05:10  Phos  2.2     08-07  Mg     1.5     08-07    TPro  4.2<L>  /  Alb  2.2<L>  /  TBili  0.3  /  DBili  x   /  AST  41  /  ALT  16  /  AlkPhos  66  08-07      LIVER FUNCTIONS - ( 07 Aug 2023 05:10 )  Alb: 2.2 g/dL / Pro: 4.2 g/dL / ALK PHOS: 66 U/L / ALT: 16 U/L / AST: 41 U/L / GGT: x           Urinalysis Basic - ( 07 Aug 2023 05:10 )    Color: x / Appearance: x / SG: x / pH: x  Gluc: 46 mg/dL / Ketone: x  / Bili: x / Urobili: x   Blood: x / Protein: x / Nitrite: x   Leuk Esterase: x / RBC: x / WBC x   Sq Epi: x / Non Sq Epi: x / Bacteria: x        Culture - Blood (collected 08-05-23 @ 07:10)  Source: .Blood Blood-Peripheral  Preliminary Report (08-06-23 @ 19:02):    No growth at 24 hours    Culture - Urine (collected 08-04-23 @ 14:42)  Source: Clean Catch Clean Catch (Midstream)  Final Report (08-05-23 @ 19:52):    No growth    Culture - Blood (collected 08-04-23 @ 11:20)  Source: .Blood Blood-Peripheral  Gram Stain (08-06-23 @ 02:58):    Growth in anaerobic bottle: Gram Negative Rods  Preliminary Report (08-06-23 @ 15:49):    Growth in anaerobic bottle: Gram Negative Rods Most closely resembling    Hungatella species    Direct identification is available within approximately 3-5    hours either by Blood Panel Multiplexed PCR or Direct    MALDI-TOF. Details: [email or web address]  Organism: Blood Culture PCR (08-06-23 @ 04:32)  Organism: Blood Culture PCR (08-06-23 @ 04:32)      Method Type: PCR      -  Blood PCR Panel: NEG    Culture - Blood (collected 08-04-23 @ 11:20)  Source: .Blood Blood-Peripheral  Preliminary Report (08-06-23 @ 23:02):    No growth at 48 Hours        Lactate, Blood: 3.4 mmol/L (08-06-23 @ 11:17)  Lactate, Blood: 3.7 mmol/L (08-04-23 @ 23:05)  Lactate, Blood: 2.9 mmol/L (08-04-23 @ 14:44)  Blood Gas Venous - Lactate: 7.40 mmol/L (08-04-23 @ 13:34)  Blood Gas Venous - Lactate: 5.80 mmol/L (08-04-23 @ 10:47)      INFECTIOUS DISEASES TESTING      RADIOLOGY & ADDITIONAL TESTS:  I have personally reviewed the last available Chest xray  CXR  Xray Chest 1 View AP/PA:   ACC: [id number] EXAM:  XR CHEST 1 VIEW   ORDERED BY: LAZ REYNA     PROCEDURE DATE:  08/05/2023        INTERPRETATION:  CLINICAL HISTORY / REASON FOR EXAM: Line placement.    COMPARISON: Chest radiograph from August 4, 2023.    TECHNIQUE/POSITIONING: Satisfactory. Single image, AP portable chest   radiograph.    FINDINGS:    SUPPORT DEVICES: Interval placement of enteric tube, with distal tip   overlying the stomach.    CARDIAC/MEDIASTINUM/HILUM: Enlarged aortic knob, stable.    LUNG PARENCHYMA/PLEURA: No focal consolidation or pleural effusion. No   pneumothorax.    SKELETON/SOFT TISSUES: Unchanged.      IMPRESSION:    Interval placement of enteric catheter in satisfactory position.    --- End of Report ---            LUCIO HOOVER MD;Attending Radiologist  This document has been electronically signed. Aug  5 2023  8:25PM (08-05-23 @ 15:50)      CT  CT Abdomen and Pelvis No Cont:   ACC: [id number] EXAM:  CT ABDOMEN AND PELVIS   ORDERED BY: RADHA JAUREGUI     PROCEDURE DATE:  08/04/2023          INTERPRETATION:  CLINICAL STATEMENT: Intra-abdominal abscess. Decreased   appetite. Hypotension.    TECHNIQUE: Contiguous axial CT imageswere obtained from the lower chest   to the pubic symphysis without intravenous contrast.  Oral contrast was   not administered.  Reformatted images in the coronal and sagittal planes   were acquired.    COMPARISON CT: 11/17/2022.    OTHER STUDIES USED FOR CORRELATION: None.      FINDINGS:    LOWER CHEST: Extensive coronary atherosclerosis. Dependent atelectasis at   the right base. Stable aneurysmal dilatation of the descending thoracic   aorta, 3.3 cm.    HEPATOBILIARY: Hepatic cysts. Questionable sludge within the gallbladder.    SPLEEN: Unremarkable.    PANCREAS: Unchanged 1 cm cystic lesion in the pancreatic body (series   4/102).    ADRENAL GLANDS: Stable nodular thickening of bilateral adrenal glands.    KIDNEYS: Unremarkable. No hydronephrosis or nephroureterolithiasis.    ABDOMINOPELVIC NODES: Unremarkable.    PELVIC ORGANS: Unremarkable.    PERITONEUM/MESENTERY/BOWEL: No bowel obstruction or inflammation. Normal   appendix. No free air or ascites. No abscess formation.    BONES/SOFT TISSUES: Osteopenia with degenerative change in the spine and   hips.    OTHER: Aneurysmal dilatation of the infrarenal abdominal aorta, stable   from 11/17/2022 measuring 5.2 x 4.9 cm; heterogeneity of the neural   thrombus, and likely not significantly changed given differences in   technique. Aneurysmal dilatation at the origin of bilateral renal   arteries, not significantly changed. Diffuse atherosclerosis.      IMPRESSION:    1. No acute abnormality in the abdomen or pelvis.  2. Aneurysmal dilatation throughout the aorta and origins of bilateral   renal arteries, not significantly changed.  3. Incidental findings as above.    --- End of Report ---        KAITLIN BYRD MD; Attending Radiologist  This document has been electronically signed. Aug  4 2023  2:29PM (08-04-23 @ 12:56)      CARDIOLOGY TESTING  12 Lead ECG:   Ventricular Rate 106 BPM    Atrial Rate 106 BPM    P-R Interval 152 ms    QRS Duration 126 ms    Q-T Interval 400 ms    QTC Calculation(Bazett) 531 ms    P Axis 34 degrees    R Axis -50 degrees    T Axis -18 degrees    Diagnosis Line Sinus tachycardia  Right bundle branch block  Left anterior fascicular block  *** Bifascicular block ***  Moderate voltage criteria for LVH, may be normal variant      Abnormal ECG    Confirmed by RUDY FLOR MD ([id number]) on 8/4/2023 11:24:55 AM (08-04-23 @ 10:57)      All available historical records have been reviewed    MEDICATIONS  allopurinol 300  atorvastatin 10  chlorhexidine 2% Cloths 1  dextrose 5%. 1000  dextrose 50% Injectable 25  glucagon  Injectable 1  insulin glargine Injectable (LANTUS) 15  insulin lispro (ADMELOG) corrective regimen sliding scale   insulin lispro Injectable (ADMELOG) 5  lactated ringers Bolus 500  magnesium sulfate  IVPB 2  meropenem  IVPB 1000  norepinephrine Infusion 0.05  pantoprazole    Tablet 40  vancomycin  IVPB 1000      ANTIBIOTICS:  meropenem  IVPB 1000 milliGRAM(s) IV Intermittent every 8 hours  vancomycin  IVPB 1000 milliGRAM(s) IV Intermittent every 12 hours      All available historical data has been reviewed   LEFT AV FISTULA/Vascular access device MILEY MARES  85y, Male  Allergy: No Known Allergies    CHIEF COMPLAINT: DKA/HHS (07 Aug 2023 09:33)    HPI:  HPI:  84 y/o M w/ PMH of DM, HTN coming from home with complaint of decreased appetite, increased urinary output and craving sweets x 2 weeks. Daughter is caretaker, states patient behavior has changed. Within the last 2 weeks, He is less active, requesting more coca cola and sugar. Pt noted to be hypotensive upon arrival.     ED vitals:  BP 74/ 50, HR 87, Temp 97.2F, satting 95% on room air  Labs significant for WBC 14K, Na 123 (corrected 141), Cr 2.4, AG 23, BHB 5.1. VBG pH 7.19, Lactate 5.8, K 8.6, pCO2 39  EKG tachycardia, bifascicular block, RBBB  CXR unremarkable  CT A/P: Extensive coronary atherosclerosis. Dependent atelectasis at the right base. Stable aneurysmal dilatation of the descending thoracic aorta, 3.3 cm. Hepatic cysts. Questionable sludge within the gallbladder. Unchanged 1 cm cystic lesion in the pancreatic body      s/p calcium gluconate 1g, Lasix 40mg push x1, barcarb 50meq, started on insulin drip at 7 units/hr.   Admitted to MICU for DKA/HHS.     (04 Aug 2023 15:16)      Infectious Diseases History:  Old Micro Data/Cultures: NA    FAMILY HISTORY:  No pertinent family history in first degree relatives      PAST MEDICAL & SURGICAL HISTORY:  HTN (hypertension)      Gout      BPH (benign prostatic hyperplasia)      Parkinson disease      HLD (hyperlipidemia)      Smoker within last 12 months      Diabetes mellitus      No significant past surgical history          SOCIAL HISTORY  Social History:  Active smoker. No alcohol use. No illicit drug use. (09 Oct 2022 00:51)      ROS  Unable to obtain due to pt's altered status.     VITALS:  T(F): 97.9, Max: 102.4 (08-06-23 @ 11:00)  HR: 114  BP: 93/52  RR: 30Vital Signs Last 24 Hrs  T(C): 36.6 (07 Aug 2023 08:00), Max: 39.1 (06 Aug 2023 11:00)  T(F): 97.9 (07 Aug 2023 08:00), Max: 102.4 (06 Aug 2023 11:00)  HR: 114 (07 Aug 2023 09:15) (91 - 130)  BP: 93/52 (07 Aug 2023 09:15) (81/52 - 145/73)  BP(mean): 66 (07 Aug 2023 09:15) (61 - 101)  RR: 30 (07 Aug 2023 09:15) (17 - 35)  SpO2: 95% (07 Aug 2023 09:15) (85% - 100%)    Parameters below as of 07 Aug 2023 09:15  Patient On (Oxygen Delivery Method): room air        PHYSICAL EXAM:  Gen: lying in bed in apparent distress, altered  HEENT: poor oral care  Lungs: crackles b/l  Abdomen: Soft, nondistended, tender to palpation  Ext: Warm, well perfused, UE edema  Neuro: non focal  Lines: bandage placed over R forearm due to infiltrated IV lines     TESTS & MEASUREMENTS:                        6.6    12.34 )-----------( 122      ( 07 Aug 2023 05:10 )             19.8     08-07    147<H>  |  117<H>  |  29<H>  ----------------------------<  46<LL>  3.6   |  23  |  1.2    Ca    7.3<L>      07 Aug 2023 05:10  Phos  2.2     08-07  Mg     1.5     08-07    TPro  4.2<L>  /  Alb  2.2<L>  /  TBili  0.3  /  DBili  x   /  AST  41  /  ALT  16  /  AlkPhos  66  08-07      LIVER FUNCTIONS - ( 07 Aug 2023 05:10 )  Alb: 2.2 g/dL / Pro: 4.2 g/dL / ALK PHOS: 66 U/L / ALT: 16 U/L / AST: 41 U/L / GGT: x           Urinalysis Basic - ( 07 Aug 2023 05:10 )    Color: x / Appearance: x / SG: x / pH: x  Gluc: 46 mg/dL / Ketone: x  / Bili: x / Urobili: x   Blood: x / Protein: x / Nitrite: x   Leuk Esterase: x / RBC: x / WBC x   Sq Epi: x / Non Sq Epi: x / Bacteria: x        Culture - Blood (collected 08-05-23 @ 07:10)  Source: .Blood Blood-Peripheral  Preliminary Report (08-06-23 @ 19:02):    No growth at 24 hours    Culture - Urine (collected 08-04-23 @ 14:42)  Source: Clean Catch Clean Catch (Midstream)  Final Report (08-05-23 @ 19:52):    No growth    Culture - Blood (collected 08-04-23 @ 11:20)  Source: .Blood Blood-Peripheral  Gram Stain (08-06-23 @ 02:58):    Growth in anaerobic bottle: Gram Negative Rods  Preliminary Report (08-06-23 @ 15:49):    Growth in anaerobic bottle: Gram Negative Rods Most closely resembling    Hungatella species    Direct identification is available within approximately 3-5    hours either by Blood Panel Multiplexed PCR or Direct    MALDI-TOF. Details: [email or web address]  Organism: Blood Culture PCR (08-06-23 @ 04:32)  Organism: Blood Culture PCR (08-06-23 @ 04:32)      Method Type: PCR      -  Blood PCR Panel: NEG    Culture - Blood (collected 08-04-23 @ 11:20)  Source: .Blood Blood-Peripheral  Preliminary Report (08-06-23 @ 23:02):    No growth at 48 Hours        Lactate, Blood: 3.4 mmol/L (08-06-23 @ 11:17)  Lactate, Blood: 3.7 mmol/L (08-04-23 @ 23:05)  Lactate, Blood: 2.9 mmol/L (08-04-23 @ 14:44)  Blood Gas Venous - Lactate: 7.40 mmol/L (08-04-23 @ 13:34)  Blood Gas Venous - Lactate: 5.80 mmol/L (08-04-23 @ 10:47)      INFECTIOUS DISEASES TESTING      RADIOLOGY & ADDITIONAL TESTS:  I have personally reviewed the last available Chest xray  CXR  Xray Chest 1 View AP/PA:   ACC: [id number] EXAM:  XR CHEST 1 VIEW   ORDERED BY: LAZ REYNA     PROCEDURE DATE:  08/05/2023        INTERPRETATION:  CLINICAL HISTORY / REASON FOR EXAM: Line placement.    COMPARISON: Chest radiograph from August 4, 2023.    TECHNIQUE/POSITIONING: Satisfactory. Single image, AP portable chest   radiograph.    FINDINGS:    SUPPORT DEVICES: Interval placement of enteric tube, with distal tip   overlying the stomach.    CARDIAC/MEDIASTINUM/HILUM: Enlarged aortic knob, stable.    LUNG PARENCHYMA/PLEURA: No focal consolidation or pleural effusion. No   pneumothorax.    SKELETON/SOFT TISSUES: Unchanged.      IMPRESSION:    Interval placement of enteric catheter in satisfactory position.    --- End of Report ---            LUCIO HOOVER MD;Attending Radiologist  This document has been electronically signed. Aug  5 2023  8:25PM (08-05-23 @ 15:50)      CT  CT Abdomen and Pelvis No Cont:   ACC: [id number] EXAM:  CT ABDOMEN AND PELVIS   ORDERED BY: RADHA JAUREGUI     PROCEDURE DATE:  08/04/2023          INTERPRETATION:  CLINICAL STATEMENT: Intra-abdominal abscess. Decreased   appetite. Hypotension.    TECHNIQUE: Contiguous axial CT imageswere obtained from the lower chest   to the pubic symphysis without intravenous contrast.  Oral contrast was   not administered.  Reformatted images in the coronal and sagittal planes   were acquired.    COMPARISON CT: 11/17/2022.    OTHER STUDIES USED FOR CORRELATION: None.      FINDINGS:    LOWER CHEST: Extensive coronary atherosclerosis. Dependent atelectasis at   the right base. Stable aneurysmal dilatation of the descending thoracic   aorta, 3.3 cm.    HEPATOBILIARY: Hepatic cysts. Questionable sludge within the gallbladder.    SPLEEN: Unremarkable.    PANCREAS: Unchanged 1 cm cystic lesion in the pancreatic body (series   4/102).    ADRENAL GLANDS: Stable nodular thickening of bilateral adrenal glands.    KIDNEYS: Unremarkable. No hydronephrosis or nephroureterolithiasis.    ABDOMINOPELVIC NODES: Unremarkable.    PELVIC ORGANS: Unremarkable.    PERITONEUM/MESENTERY/BOWEL: No bowel obstruction or inflammation. Normal   appendix. No free air or ascites. No abscess formation.    BONES/SOFT TISSUES: Osteopenia with degenerative change in the spine and   hips.    OTHER: Aneurysmal dilatation of the infrarenal abdominal aorta, stable   from 11/17/2022 measuring 5.2 x 4.9 cm; heterogeneity of the neural   thrombus, and likely not significantly changed given differences in   technique. Aneurysmal dilatation at the origin of bilateral renal   arteries, not significantly changed. Diffuse atherosclerosis.      IMPRESSION:    1. No acute abnormality in the abdomen or pelvis.  2. Aneurysmal dilatation throughout the aorta and origins of bilateral   renal arteries, not significantly changed.  3. Incidental findings as above.    --- End of Report ---        KAITLIN BYRD MD; Attending Radiologist  This document has been electronically signed. Aug  4 2023  2:29PM (08-04-23 @ 12:56)      CARDIOLOGY TESTING  12 Lead ECG:   Ventricular Rate 106 BPM    Atrial Rate 106 BPM    P-R Interval 152 ms    QRS Duration 126 ms    Q-T Interval 400 ms    QTC Calculation(Bazett) 531 ms    P Axis 34 degrees    R Axis -50 degrees    T Axis -18 degrees    Diagnosis Line Sinus tachycardia  Right bundle branch block  Left anterior fascicular block  *** Bifascicular block ***  Moderate voltage criteria for LVH, may be normal variant      Abnormal ECG    Confirmed by RUDY FLOR MD ([id number]) on 8/4/2023 11:24:55 AM (08-04-23 @ 10:57)      All available historical records have been reviewed    MEDICATIONS  allopurinol 300  atorvastatin 10  chlorhexidine 2% Cloths 1  dextrose 5%. 1000  dextrose 50% Injectable 25  glucagon  Injectable 1  insulin glargine Injectable (LANTUS) 15  insulin lispro (ADMELOG) corrective regimen sliding scale   insulin lispro Injectable (ADMELOG) 5  lactated ringers Bolus 500  magnesium sulfate  IVPB 2  meropenem  IVPB 1000  norepinephrine Infusion 0.05  pantoprazole    Tablet 40  vancomycin  IVPB 1000      ANTIBIOTICS:  meropenem  IVPB 1000 milliGRAM(s) IV Intermittent every 8 hours  vancomycin  IVPB 1000 milliGRAM(s) IV Intermittent every 12 hours      All available historical data has been reviewed

## 2023-08-09 ENCOUNTER — OUTPATIENT (OUTPATIENT)
Dept: OUTPATIENT SERVICES | Facility: HOSPITAL | Age: 63
LOS: 1 days | End: 2023-08-09
Payer: MEDICARE

## 2023-08-09 ENCOUNTER — APPOINTMENT (OUTPATIENT)
Dept: PODIATRY | Facility: CLINIC | Age: 63
End: 2023-08-09
Payer: MEDICARE

## 2023-08-09 DIAGNOSIS — Z00.00 ENCOUNTER FOR GENERAL ADULT MEDICAL EXAMINATION WITHOUT ABNORMAL FINDINGS: ICD-10-CM

## 2023-08-09 DIAGNOSIS — Z95.1 PRESENCE OF AORTOCORONARY BYPASS GRAFT: Chronic | ICD-10-CM

## 2023-08-09 DIAGNOSIS — Z95.828 PRESENCE OF OTHER VASCULAR IMPLANTS AND GRAFTS: Chronic | ICD-10-CM

## 2023-08-09 DIAGNOSIS — I77.0 ARTERIOVENOUS FISTULA, ACQUIRED: Chronic | ICD-10-CM

## 2023-08-09 DIAGNOSIS — Z98.890 OTHER SPECIFIED POSTPROCEDURAL STATES: Chronic | ICD-10-CM

## 2023-08-09 PROCEDURE — 29515 APPLICATION SHORT LEG SPLINT: CPT | Mod: 59,LT

## 2023-08-09 PROCEDURE — 11042 DBRDMT SUBQ TIS 1ST 20SQCM/<: CPT

## 2023-08-09 PROCEDURE — 29515 APPLICATION SHORT LEG SPLINT: CPT | Mod: LT

## 2023-08-10 NOTE — PRE-OP CHECKLIST - 1.
came to pre op with back pack, picked up by staff from 4B without difficulty came to pre op with back pack, picked up by NADIR Griffin  from 4B

## 2023-08-15 NOTE — HISTORY OF PRESENT ILLNESS
[Wheelchair] : a wheelchair [FreeTextEntry1] : L Heel ulcer - Improvement with wound care, serial dbx  - TCC  New R Heel ulcer

## 2023-08-15 NOTE — ASSESSMENT
[FreeTextEntry1] : Sharp Ex Dbx fibrotic tissue down and including subcutaneous tissue L foot using a dermal curet. SIze above Splint  LLE  FWB in a regular shoe R foot FWB LLE with a TCC  RTO 1 week [Verbal] : verbal [Patient] : patient [Good - alert, interested, motivated] : Good - alert, interested, motivated

## 2023-08-15 NOTE — PROCEDURE
[] : A short leg posterior mold with sugartong splint was applied to the left leg. [FreeTextEntry1] : pt with new breakdown to right heel. both heels debrided of callous bio film. left heel wound much improved, new skin present, audrey. right heel superficial, no signs of infection. approximately 3 x 2,5 cm. both sites dressed with cyndi,3x3 ab pad omnifix. left splint applied, pt to return in 2 weeks.

## 2023-08-15 NOTE — PHYSICAL EXAM
[Foot Ulcer] : foot ulcer [Ankle Swelling (On Exam)] : present [Ankle Swelling On The Left] : moderate [FreeTextEntry3] : Diminished pulses B/L LE [de-identified] : Rocker bottom deformity L foot  L 5th toe amp [FreeTextEntry1] : L Heel Ulcer, down to Subcutaneous Tissue, Size 2.5 x 3 cm,  No erythema, no drainage, no malodor R Heel Ulcer down to Subcutaneous Tissue [Diminished Throughout Right Foot] : diminished sensation with monofilament testing throughout right foot [Diminished Throughout Left Foot] : diminished sensation with monofilament testing throughout left foot

## 2023-08-16 ENCOUNTER — APPOINTMENT (OUTPATIENT)
Dept: PODIATRY | Facility: CLINIC | Age: 63
End: 2023-08-16
Payer: MEDICARE

## 2023-08-16 ENCOUNTER — OUTPATIENT (OUTPATIENT)
Dept: OUTPATIENT SERVICES | Facility: HOSPITAL | Age: 63
LOS: 1 days | End: 2023-08-16
Payer: MEDICARE

## 2023-08-16 ENCOUNTER — APPOINTMENT (OUTPATIENT)
Dept: PODIATRY | Facility: CLINIC | Age: 63
End: 2023-08-16

## 2023-08-16 DIAGNOSIS — Z95.828 PRESENCE OF OTHER VASCULAR IMPLANTS AND GRAFTS: Chronic | ICD-10-CM

## 2023-08-16 DIAGNOSIS — I77.0 ARTERIOVENOUS FISTULA, ACQUIRED: Chronic | ICD-10-CM

## 2023-08-16 DIAGNOSIS — L97.412 NON-PRESSURE CHRONIC ULCER OF RIGHT HEEL AND MIDFOOT WITH FAT LAYER EXPOSED: ICD-10-CM

## 2023-08-16 DIAGNOSIS — L97.422 NON-PRESSURE CHRONIC ULCER OF LEFT HEEL AND MIDFOOT WITH FAT LAYER EXPOSED: ICD-10-CM

## 2023-08-16 DIAGNOSIS — M14.60 CHARCOT'S JOINT, UNSPECIFIED SITE: ICD-10-CM
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
## 2023-08-16 DIAGNOSIS — I70.209 UNSPECIFIED ATHEROSCLEROSIS OF NATIVE ARTERIES OF EXTREMITIES, UNSPECIFIED EXTREMITY: ICD-10-CM

## 2023-08-16 DIAGNOSIS — Z00.00 ENCOUNTER FOR GENERAL ADULT MEDICAL EXAMINATION WITHOUT ABNORMAL FINDINGS: ICD-10-CM

## 2023-08-16 DIAGNOSIS — Z95.1 PRESENCE OF AORTOCORONARY BYPASS GRAFT: Chronic | ICD-10-CM

## 2023-08-16 DIAGNOSIS — Z98.890 OTHER SPECIFIED POSTPROCEDURAL STATES: Chronic | ICD-10-CM

## 2023-08-16 DIAGNOSIS — E11.621 TYPE 2 DIABETES MELLITUS WITH FOOT ULCER: ICD-10-CM
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
## 2023-08-16 DIAGNOSIS — I70.235 ATHEROSCLEROSIS OF NATIVE ARTERIES OF RIGHT LEG WITH ULCERATION OF OTHER PART OF FOOT: ICD-10-CM

## 2023-08-16 PROCEDURE — 11042 DBRDMT SUBQ TIS 1ST 20SQCM/<: CPT | Mod: 50

## 2023-08-16 PROCEDURE — 11042 DBRDMT SUBQ TIS 1ST 20SQCM/<: CPT

## 2023-08-17 NOTE — HISTORY OF PRESENT ILLNESS
[Wheelchair] : a wheelchair [FreeTextEntry1] : L Heel ulcer - Improvement with wound care, serial dbx  - TCC  R Heel ulcer

## 2023-08-17 NOTE — PHYSICAL EXAM
[Foot Ulcer] : foot ulcer [Ankle Swelling (On Exam)] : present [Ankle Swelling On The Left] : moderate [FreeTextEntry3] : Diminished pulses B/L LE [FreeTextEntry1] : L Heel Ulcer, down to Subcutaneous Tissue, Size 2.5 x 3 cm,  No erythema, no drainage, no malodor R Heel Ulcer down to Subcutaneous Tissue [de-identified] : Rocker bottom deformity L foot  L 5th toe amp [Diminished Throughout Right Foot] : diminished sensation with monofilament testing throughout right foot [Diminished Throughout Left Foot] : diminished sensation with monofilament testing throughout left foot
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
## 2023-08-18 DIAGNOSIS — I70.209 UNSPECIFIED ATHEROSCLEROSIS OF NATIVE ARTERIES OF EXTREMITIES, UNSPECIFIED EXTREMITY: ICD-10-CM
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
## 2023-08-18 DIAGNOSIS — L97.422 NON-PRESSURE CHRONIC ULCER OF LEFT HEEL AND MIDFOOT WITH FAT LAYER EXPOSED: ICD-10-CM

## 2023-08-18 DIAGNOSIS — E11.621 TYPE 2 DIABETES MELLITUS WITH FOOT ULCER: ICD-10-CM

## 2023-08-18 DIAGNOSIS — L97.412 NON-PRESSURE CHRONIC ULCER OF RIGHT HEEL AND MIDFOOT WITH FAT LAYER EXPOSED: ICD-10-CM

## 2023-08-23 ENCOUNTER — APPOINTMENT (OUTPATIENT)
Dept: PODIATRY | Facility: CLINIC | Age: 63
End: 2023-08-23
Payer: MEDICARE

## 2023-08-23 ENCOUNTER — OUTPATIENT (OUTPATIENT)
Dept: OUTPATIENT SERVICES | Facility: HOSPITAL | Age: 63
LOS: 1 days | End: 2023-08-23
Payer: MEDICARE

## 2023-08-23 DIAGNOSIS — Z00.00 ENCOUNTER FOR GENERAL ADULT MEDICAL EXAMINATION WITHOUT ABNORMAL FINDINGS: ICD-10-CM

## 2023-08-23 DIAGNOSIS — Z98.890 OTHER SPECIFIED POSTPROCEDURAL STATES: Chronic | ICD-10-CM

## 2023-08-23 DIAGNOSIS — I77.0 ARTERIOVENOUS FISTULA, ACQUIRED: Chronic | ICD-10-CM

## 2023-08-23 DIAGNOSIS — Z95.828 PRESENCE OF OTHER VASCULAR IMPLANTS AND GRAFTS: Chronic | ICD-10-CM

## 2023-08-23 PROCEDURE — 11042 DBRDMT SUBQ TIS 1ST 20SQCM/<: CPT

## 2023-08-23 PROCEDURE — 29445 APPL RIGID TOT CNTC LEG CAST: CPT | Mod: LT

## 2023-08-23 PROCEDURE — 29580 STRAPPING UNNA BOOT: CPT | Mod: RT

## 2023-08-23 PROCEDURE — 29445 APPL RIGID TOT CNTC LEG CAST: CPT | Mod: 59,LT

## 2023-08-23 PROCEDURE — 29580 STRAPPING UNNA BOOT: CPT | Mod: 59,RT

## 2023-08-23 NOTE — ED ADULT TRIAGE NOTE - NS ED TRIAGE AVPU SCALE
Pt admitted to ICU Rm 16, report given to ALEJANDRINA Gómez, all questions answered. Transported pt to unit in no apparent distress, on cardiac monitor.    Alert-The patient is alert, awake and responds to voice. The patient is oriented to time, place, and person. The triage nurse is able to obtain subjective information.

## 2023-08-25 NOTE — ASSESSMENT
[FreeTextEntry1] : Sharp Ex Dbx fibrotic tissue down and including subcutaneous tissue B/L  feet using a dermal curet. SIze above TCC LLE  Unna boot RLE  FWB in a regular shoe R foot FWB LLE with a TCC  RTO 1 week [Verbal] : verbal [Patient] : patient [Good - alert, interested, motivated] : Good - alert, interested, motivated

## 2023-08-25 NOTE — PHYSICAL EXAM
[Foot Ulcer] : foot ulcer [Ankle Swelling (On Exam)] : present [Ankle Swelling Bilaterally] : bilaterally  [Ankle Swelling On The Left] : moderate [FreeTextEntry3] : Diminished pulses B/L LE [de-identified] : Rocker bottom deformity L foot  L 5th toe amp [FreeTextEntry1] : L Heel Ulcer, down to Subcutaneous Tissue, Size 2 x 1.2 cm,  No erythema, no drainage, no malodor R Heel Ulcer down to Subcutaneous Tissue [Diminished Throughout Right Foot] : diminished sensation with monofilament testing throughout right foot [Diminished Throughout Left Foot] : diminished sensation with monofilament testing throughout left foot

## 2023-08-25 NOTE — PROCEDURE
[] : A well-padded Unna Boot was applied to the left lower extremity. [FreeTextEntry1] : heel wounds cleaned, mechanical debrided of bio film. both sites granular with no signs of infection. left wound audrey. both sites dressed with xeroform,2x2s,omnifix. right tubigrip applied. left unna boot with ab pad to heel,total contact applied over unna boot. pt to return next week.

## 2023-08-25 NOTE — HISTORY OF PRESENT ILLNESS
[Wheelchair] : a wheelchair [FreeTextEntry1] : L Heel ulcer - Improvement with wound care, serial dbx  - TCC  R Heel ulcer  - Unna boot

## 2023-08-29 DIAGNOSIS — E11.621 TYPE 2 DIABETES MELLITUS WITH FOOT ULCER: ICD-10-CM

## 2023-08-29 DIAGNOSIS — L97.422 NON-PRESSURE CHRONIC ULCER OF LEFT HEEL AND MIDFOOT WITH FAT LAYER EXPOSED: ICD-10-CM

## 2023-08-29 DIAGNOSIS — I70.209 UNSPECIFIED ATHEROSCLEROSIS OF NATIVE ARTERIES OF EXTREMITIES, UNSPECIFIED EXTREMITY: ICD-10-CM
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
## 2023-08-29 DIAGNOSIS — L97.412 NON-PRESSURE CHRONIC ULCER OF RIGHT HEEL AND MIDFOOT WITH FAT LAYER EXPOSED: ICD-10-CM
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
## 2023-08-29 DIAGNOSIS — M79.89 OTHER SPECIFIED SOFT TISSUE DISORDERS: ICD-10-CM

## 2023-08-30 NOTE — ED ADULT NURSE NOTE - CAS EDN DISCHARGE INTERVENTIONS
Called patient and advised new gyn Medicaid appointments are very limited and are booked until the end of 2023 or later.  Patient advised to try [facility] at [phone], [facility] at [phone] or [facility] at [phone].  Patient verbalized understanding.     IV intact

## 2023-08-31 ENCOUNTER — APPOINTMENT (OUTPATIENT)
Dept: PODIATRY | Facility: CLINIC | Age: 63
End: 2023-08-31
Payer: MEDICARE

## 2023-08-31 ENCOUNTER — OUTPATIENT (OUTPATIENT)
Dept: OUTPATIENT SERVICES | Facility: HOSPITAL | Age: 63
LOS: 1 days | End: 2023-08-31
Payer: MEDICARE

## 2023-08-31 DIAGNOSIS — Z98.890 OTHER SPECIFIED POSTPROCEDURAL STATES: Chronic | ICD-10-CM

## 2023-08-31 DIAGNOSIS — I77.0 ARTERIOVENOUS FISTULA, ACQUIRED: Chronic | ICD-10-CM

## 2023-08-31 DIAGNOSIS — Z95.1 PRESENCE OF AORTOCORONARY BYPASS GRAFT: Chronic | ICD-10-CM

## 2023-08-31 DIAGNOSIS — Z00.00 ENCOUNTER FOR GENERAL ADULT MEDICAL EXAMINATION WITHOUT ABNORMAL FINDINGS: ICD-10-CM

## 2023-08-31 DIAGNOSIS — Z95.828 PRESENCE OF OTHER VASCULAR IMPLANTS AND GRAFTS: Chronic | ICD-10-CM

## 2023-08-31 PROCEDURE — 29515 APPLICATION SHORT LEG SPLINT: CPT | Mod: LT,59

## 2023-08-31 PROCEDURE — 11042 DBRDMT SUBQ TIS 1ST 20SQCM/<: CPT

## 2023-08-31 PROCEDURE — 29581 APPL MULTLAYER CMPRN SYS LEG: CPT | Mod: RT

## 2023-08-31 PROCEDURE — 29515 APPLICATION SHORT LEG SPLINT: CPT | Mod: LT

## 2023-08-31 PROCEDURE — 29581 APPL MULTLAYER CMPRN SYS LEG: CPT | Mod: RT,59

## 2023-09-01 NOTE — PHYSICAL EXAM
[Foot Ulcer] : foot ulcer [Ankle Swelling (On Exam)] : present [Ankle Swelling Bilaterally] : bilaterally  [Ankle Swelling On The Left] : moderate [FreeTextEntry3] : Diminished pulses B/L LE [de-identified] : Rocker bottom deformity L foot  L 5th toe amp [FreeTextEntry1] : L Heel Ulcer, down to Subcutaneous Tissue, Size 1.5 x 1 cm,  No erythema, no drainage, no malodor R Heel Ulcer down to Subcutaneous Tissue [Diminished Throughout Right Foot] : diminished sensation with monofilament testing throughout right foot [Diminished Throughout Left Foot] : diminished sensation with monofilament testing throughout left foot

## 2023-09-01 NOTE — ASSESSMENT
[FreeTextEntry1] : Sharp Ex Dbx fibrotic tissue down and including subcutaneous tissue B/L  feet using a dermal curet. SIze above TCC LLE  multilayer compression dressing RLE  FWB in a regular shoe R foot FWB LLE with a TCC  RTO 1 week [Verbal] : verbal [Patient] : patient [Good - alert, interested, motivated] : Good - alert, interested, motivated

## 2023-09-01 NOTE — PROCEDURE
[] : A layered compression dressing was applied to the lower right extremity, which included an Unna boot, cast padding, Coband, and Tubigrip. [FreeTextEntry1] : dressings removed,wounds cleaned. mild debridement of callous/bio film. both sites progressing well. left almost fully healed. left dressed with cyndi,2x2s,omnifix,unna boot to decrease edema,total contact cast to off load site. right dressed with xeroform,2x2,omnifix,,tubigrip. right changed daily by nursing. pt to return next week.

## 2023-09-03 NOTE — PRE-OP CHECKLIST - SITE MARKED BY ANESTHESIOLOGIST
Pt was seen, circumstances of admission reviewed  Admission H&P from earlier this am reviewed    Discussed with ER nurse.  Patient is awaiting admission to the [facility].    Vital signs have been stable  Patient states he feels fatigued, denies other concerns.    He currently is sleeping, easily alerts, is fully oriented  Lungs clear  CV regular rhythm  Abdomen very protuberant, nontender  No extremity edema    Labs reviewed:     Latest Reference Range & Units 09/03/23 06:44   Sodium 136 - 145 mmol/L 124 (L)   Potassium 3.4 - 5.3 mmol/L 5.1   Chloride 98 - 107 mmol/L 91 (L)   Carbon Dioxide (CO2) 22 - 29 mmol/L 12 (L)   Urea Nitrogen 6.0 - 20.0 mg/dL 83.9 (H)   Creatinine 0.67 - 1.17 mg/dL 7.21 (H)   GFR Estimate >60 mL/min/1.73m2 9 (L)   Calcium 8.6 - 10.0 mg/dL 9.3   Anion Gap 7 - 15 mmol/L 21 (H)   Albumin 3.5 - 5.2 g/dL 3.0 (L)   Protein Total  See Comment   Alkaline Phosphatase 40 - 129 U/L 151 (H)   ALT 0 - 70 U/L 59   AST 0 - 45 U/L 173 (H)   Bilirubin Total <=1.2 mg/dL 37.6 (HH)   Glucose 70 - 99 mg/dL 95   Lactate Dehydrogenase 0 - 250 U/L 274 (H)      Latest Reference Range & Units 09/03/23 06:44   WBC 4.0 - 11.0 10e3/uL 20.3 (H)   Hemoglobin 13.3 - 17.7 g/dL 9.9 (L)   Hematocrit 40.0 - 53.0 % 26.6 (L)   Platelet Count 150 - 450 10e3/uL 191   RBC Count 4.40 - 5.90 10e6/uL 2.93 (L)   MCV 78 - 100 fL 91   MCH 26.5 - 33.0 pg 33.8 (H)   MCHC 31.5 - 36.5 g/dL 37.2 (H)   RDW 10.0 - 15.0 % 19.8 (H)       Assessment/plan    Acute kidney injury on chronic kidney disease, hyponatremia in the setting of decompensated cirrhosis, slight improvement in creatinine with IV fluid challenge  Elevated ammonia level, patient does not appear encephalopathic at this time  Plan: Start albumin drip, continue PPI, octreotide.  Start midodrine, monitor blood pressure continue with history of hypertension  Continue ceftriaxone.  Peritoneal fluid studies pending  Will need paracentesis in the next 1 to 2 days most likely  Will hold 
on lactulose for the time being  Repeat BMP noon    Acute on chronic hematochezia, history of grade 2 esophageal varices status post banding August 28, 2023  Hemoglobin down slightly overnight from 10.4-8.9  Plan: Continue PPI, trend hemoglobin    Leukocytosis, unclear etiology  Recent C. Difficile  Plan: Continue Rocephin, await blood cultures and peritoneal fluid cultures    Alcohol use disorder  No reported alcohol use since recent hospital discharge  Plan: Continue multivitamins thiamine, folic acid    We will monitor closely while patient is in the ER, awaiting admission to [facility]      Brendan Cat MD              
n/a

## 2023-09-05 DIAGNOSIS — M79.89 OTHER SPECIFIED SOFT TISSUE DISORDERS: ICD-10-CM

## 2023-09-05 DIAGNOSIS — L97.422 NON-PRESSURE CHRONIC ULCER OF LEFT HEEL AND MIDFOOT WITH FAT LAYER EXPOSED: ICD-10-CM

## 2023-09-05 DIAGNOSIS — E11.42 TYPE 2 DIABETES MELLITUS WITH DIABETIC POLYNEUROPATHY: ICD-10-CM

## 2023-09-05 DIAGNOSIS — M14.60 CHARCOT'S JOINT, UNSPECIFIED SITE: ICD-10-CM

## 2023-09-05 DIAGNOSIS — E11.621 TYPE 2 DIABETES MELLITUS WITH FOOT ULCER: ICD-10-CM
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
## 2023-09-05 DIAGNOSIS — L97.412 NON-PRESSURE CHRONIC ULCER OF RIGHT HEEL AND MIDFOOT WITH FAT LAYER EXPOSED: ICD-10-CM
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
## 2023-09-06 ENCOUNTER — OUTPATIENT (OUTPATIENT)
Dept: OUTPATIENT SERVICES | Facility: HOSPITAL | Age: 63
LOS: 1 days | End: 2023-09-06
Payer: MEDICARE

## 2023-09-06 ENCOUNTER — APPOINTMENT (OUTPATIENT)
Dept: PODIATRY | Facility: CLINIC | Age: 63
End: 2023-09-06
Payer: MEDICARE

## 2023-09-06 DIAGNOSIS — Z95.828 PRESENCE OF OTHER VASCULAR IMPLANTS AND GRAFTS: Chronic | ICD-10-CM

## 2023-09-06 DIAGNOSIS — I77.0 ARTERIOVENOUS FISTULA, ACQUIRED: Chronic | ICD-10-CM

## 2023-09-06 DIAGNOSIS — Z95.1 PRESENCE OF AORTOCORONARY BYPASS GRAFT: Chronic | ICD-10-CM

## 2023-09-06 DIAGNOSIS — Z00.00 ENCOUNTER FOR GENERAL ADULT MEDICAL EXAMINATION WITHOUT ABNORMAL FINDINGS: ICD-10-CM

## 2023-09-06 PROCEDURE — 11042 DBRDMT SUBQ TIS 1ST 20SQCM/<: CPT

## 2023-09-06 PROCEDURE — 29581 APPL MULTLAYER CMPRN SYS LEG: CPT | Mod: 59,RT

## 2023-09-06 PROCEDURE — 29581 APPL MULTLAYER CMPRN SYS LEG: CPT | Mod: RT

## 2023-09-06 PROCEDURE — 29445 APPL RIGID TOT CNTC LEG CAST: CPT | Mod: 59,LT

## 2023-09-06 PROCEDURE — 29445 APPL RIGID TOT CNTC LEG CAST: CPT | Mod: LT

## 2023-09-06 NOTE — PROCEDURE
[] : A well-padded Unna Boot was applied to the left lower extremity. [FreeTextEntry1] : cast and dressings removed. both sites cleaned. left heel wound almost fully healed, right audrey, new skin present. right dressed with medi honey,xeroform,2x2,omnifix, tubgrip stocking. left dressed with eroform.2x2,omnifix. unna boot applied to control edema, total contact cast to off load wound site. pt to return next week.

## 2023-09-12 ENCOUNTER — APPOINTMENT (OUTPATIENT)
Dept: UROLOGY | Facility: CLINIC | Age: 63
End: 2023-09-12
Payer: MEDICARE

## 2023-09-12 VITALS
HEIGHT: 71 IN | WEIGHT: 250 LBS | SYSTOLIC BLOOD PRESSURE: 145 MMHG | BODY MASS INDEX: 35 KG/M2 | HEART RATE: 77 BPM | DIASTOLIC BLOOD PRESSURE: 80 MMHG

## 2023-09-12 DIAGNOSIS — E11.42 TYPE 2 DIABETES MELLITUS WITH DIABETIC POLYNEUROPATHY: ICD-10-CM

## 2023-09-12 DIAGNOSIS — E11.621 TYPE 2 DIABETES MELLITUS WITH FOOT ULCER: ICD-10-CM
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
## 2023-09-12 DIAGNOSIS — M14.60 CHARCOT'S JOINT, UNSPECIFIED SITE: ICD-10-CM

## 2023-09-12 DIAGNOSIS — L97.422 NON-PRESSURE CHRONIC ULCER OF LEFT HEEL AND MIDFOOT WITH FAT LAYER EXPOSED: ICD-10-CM

## 2023-09-12 DIAGNOSIS — I70.235 ATHEROSCLEROSIS OF NATIVE ARTERIES OF RIGHT LEG WITH ULCERATION OF OTHER PART OF FOOT: ICD-10-CM

## 2023-09-12 DIAGNOSIS — L97.412 NON-PRESSURE CHRONIC ULCER OF RIGHT HEEL AND MIDFOOT WITH FAT LAYER EXPOSED: ICD-10-CM

## 2023-09-12 PROCEDURE — 99214 OFFICE O/P EST MOD 30 MIN: CPT

## 2023-09-12 RX ORDER — SENNOSIDES 8.6 MG/1
8.6 CAPSULE, GELATIN COATED ORAL
Refills: 0 | Status: ACTIVE | COMMUNITY

## 2023-09-12 RX ORDER — FUROSEMIDE 80 MG/1
TABLET ORAL
Refills: 0 | Status: ACTIVE | COMMUNITY

## 2023-09-13 ENCOUNTER — APPOINTMENT (OUTPATIENT)
Dept: PODIATRY | Facility: CLINIC | Age: 63
End: 2023-09-13
Payer: MEDICARE

## 2023-09-13 ENCOUNTER — OUTPATIENT (OUTPATIENT)
Dept: OUTPATIENT SERVICES | Facility: HOSPITAL | Age: 63
LOS: 1 days | End: 2023-09-13
Payer: MEDICARE

## 2023-09-13 DIAGNOSIS — Z00.00 ENCOUNTER FOR GENERAL ADULT MEDICAL EXAMINATION WITHOUT ABNORMAL FINDINGS: ICD-10-CM

## 2023-09-13 DIAGNOSIS — Z95.1 PRESENCE OF AORTOCORONARY BYPASS GRAFT: Chronic | ICD-10-CM

## 2023-09-13 DIAGNOSIS — Z98.890 OTHER SPECIFIED POSTPROCEDURAL STATES: Chronic | ICD-10-CM

## 2023-09-13 DIAGNOSIS — Z95.828 PRESENCE OF OTHER VASCULAR IMPLANTS AND GRAFTS: Chronic | ICD-10-CM

## 2023-09-13 PROCEDURE — 29445 APPL RIGID TOT CNTC LEG CAST: CPT | Mod: LT

## 2023-09-13 PROCEDURE — 29580 STRAPPING UNNA BOOT: CPT | Mod: RT

## 2023-09-13 PROCEDURE — 29580 STRAPPING UNNA BOOT: CPT | Mod: RT,59

## 2023-09-13 PROCEDURE — 11042 DBRDMT SUBQ TIS 1ST 20SQCM/<: CPT

## 2023-09-13 PROCEDURE — 29445 APPL RIGID TOT CNTC LEG CAST: CPT | Mod: LT,59
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
## 2023-09-15 DIAGNOSIS — L97.422 NON-PRESSURE CHRONIC ULCER OF LEFT HEEL AND MIDFOOT WITH FAT LAYER EXPOSED: ICD-10-CM

## 2023-09-15 DIAGNOSIS — M14.60 CHARCOT'S JOINT, UNSPECIFIED SITE: ICD-10-CM

## 2023-09-15 DIAGNOSIS — E11.621 TYPE 2 DIABETES MELLITUS WITH FOOT ULCER: ICD-10-CM
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
## 2023-09-15 DIAGNOSIS — L97.412 NON-PRESSURE CHRONIC ULCER OF RIGHT HEEL AND MIDFOOT WITH FAT LAYER EXPOSED: ICD-10-CM

## 2023-09-19 NOTE — PHYSICAL THERAPY INITIAL EVALUATION ADULT - ASSISTIVE DEVICE FOR TRANSFER: GAIT, REHAB EVAL
rolling walker Double Z Plasty Text: The lesion was extirpated to the level of the fat with a #15 scalpel blade. Given the location of the defect, shape of the defect and the proximity to free margins a double Z-plasty was deemed most appropriate for repair. Using a sterile surgical marker, the appropriate transposition arms of the double Z-plasty were drawn incorporating the defect and placing the expected incisions within the relaxed skin tension lines where possible. The area thus outlined was incised deep to adipose tissue with a #15 scalpel blade. The skin margins were undermined to an appropriate distance in all directions utilizing iris scissors. The opposing transposition arms were then transposed and carried over into place in opposite direction and anchored with interrupted buried subcutaneous sutures.

## 2023-09-20 ENCOUNTER — APPOINTMENT (OUTPATIENT)
Dept: PODIATRY | Facility: CLINIC | Age: 63
End: 2023-09-20
Payer: MEDICARE

## 2023-09-20 ENCOUNTER — OUTPATIENT (OUTPATIENT)
Dept: OUTPATIENT SERVICES | Facility: HOSPITAL | Age: 63
LOS: 1 days | End: 2023-09-20
Payer: MEDICARE

## 2023-09-20 DIAGNOSIS — I77.0 ARTERIOVENOUS FISTULA, ACQUIRED: Chronic | ICD-10-CM

## 2023-09-20 DIAGNOSIS — Z00.00 ENCOUNTER FOR GENERAL ADULT MEDICAL EXAMINATION WITHOUT ABNORMAL FINDINGS: ICD-10-CM

## 2023-09-20 DIAGNOSIS — Z95.1 PRESENCE OF AORTOCORONARY BYPASS GRAFT: Chronic | ICD-10-CM

## 2023-09-20 DIAGNOSIS — Z98.890 OTHER SPECIFIED POSTPROCEDURAL STATES: Chronic | ICD-10-CM

## 2023-09-20 PROCEDURE — 11042 DBRDMT SUBQ TIS 1ST 20SQCM/<: CPT

## 2023-09-20 PROCEDURE — 29580 STRAPPING UNNA BOOT: CPT | Mod: 50,59

## 2023-09-20 PROCEDURE — 97763 ORTHC/PROSTC MGMT SBSQ ENC: CPT | Mod: GP

## 2023-09-20 PROCEDURE — 29580 STRAPPING UNNA BOOT: CPT | Mod: 50,XU

## 2023-09-26 DIAGNOSIS — M79.89 OTHER SPECIFIED SOFT TISSUE DISORDERS: ICD-10-CM

## 2023-09-26 DIAGNOSIS — I70.235 ATHEROSCLEROSIS OF NATIVE ARTERIES OF RIGHT LEG WITH ULCERATION OF OTHER PART OF FOOT: ICD-10-CM

## 2023-09-26 DIAGNOSIS — M14.60 CHARCOT'S JOINT, UNSPECIFIED SITE: ICD-10-CM

## 2023-09-26 DIAGNOSIS — E11.621 TYPE 2 DIABETES MELLITUS WITH FOOT ULCER: ICD-10-CM

## 2023-09-26 DIAGNOSIS — L97.412 NON-PRESSURE CHRONIC ULCER OF RIGHT HEEL AND MIDFOOT WITH FAT LAYER EXPOSED: ICD-10-CM
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
## 2023-09-27 ENCOUNTER — APPOINTMENT (OUTPATIENT)
Dept: PODIATRY | Facility: CLINIC | Age: 63
End: 2023-09-27
Payer: MEDICARE

## 2023-09-27 ENCOUNTER — OUTPATIENT (OUTPATIENT)
Dept: OUTPATIENT SERVICES | Facility: HOSPITAL | Age: 63
LOS: 1 days | End: 2023-09-27
Payer: MEDICARE

## 2023-09-27 DIAGNOSIS — Z00.00 ENCOUNTER FOR GENERAL ADULT MEDICAL EXAMINATION WITHOUT ABNORMAL FINDINGS: ICD-10-CM

## 2023-09-27 DIAGNOSIS — Z95.1 PRESENCE OF AORTOCORONARY BYPASS GRAFT: Chronic | ICD-10-CM

## 2023-09-27 DIAGNOSIS — I77.0 ARTERIOVENOUS FISTULA, ACQUIRED: Chronic | ICD-10-CM

## 2023-09-27 DIAGNOSIS — Z95.828 PRESENCE OF OTHER VASCULAR IMPLANTS AND GRAFTS: Chronic | ICD-10-CM

## 2023-09-27 PROCEDURE — 29580 STRAPPING UNNA BOOT: CPT | Mod: 59,RT

## 2023-09-27 PROCEDURE — 11042 DBRDMT SUBQ TIS 1ST 20SQCM/<: CPT

## 2023-09-27 PROCEDURE — 29445 APPL RIGID TOT CNTC LEG CAST: CPT | Mod: LT

## 2023-09-27 PROCEDURE — 11042 DBRDMT SUBQ TIS 1ST 20SQCM/<: CPT | Mod: 50

## 2023-09-27 PROCEDURE — 29445 APPL RIGID TOT CNTC LEG CAST: CPT | Mod: 59,LT

## 2023-09-27 PROCEDURE — 29580 STRAPPING UNNA BOOT: CPT | Mod: RT
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
## 2023-09-29 DIAGNOSIS — L97.422 NON-PRESSURE CHRONIC ULCER OF LEFT HEEL AND MIDFOOT WITH FAT LAYER EXPOSED: ICD-10-CM
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
## 2023-09-29 DIAGNOSIS — L97.412 NON-PRESSURE CHRONIC ULCER OF RIGHT HEEL AND MIDFOOT WITH FAT LAYER EXPOSED: ICD-10-CM

## 2023-09-29 DIAGNOSIS — M79.89 OTHER SPECIFIED SOFT TISSUE DISORDERS: ICD-10-CM

## 2023-09-29 DIAGNOSIS — E11.621 TYPE 2 DIABETES MELLITUS WITH FOOT ULCER: ICD-10-CM

## 2023-10-04 ENCOUNTER — OUTPATIENT (OUTPATIENT)
Dept: OUTPATIENT SERVICES | Facility: HOSPITAL | Age: 63
LOS: 1 days | End: 2023-10-04
Payer: MEDICARE

## 2023-10-04 ENCOUNTER — APPOINTMENT (OUTPATIENT)
Dept: PODIATRY | Facility: CLINIC | Age: 63
End: 2023-10-04
Payer: MEDICARE
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
## 2023-10-04 DIAGNOSIS — Z95.1 PRESENCE OF AORTOCORONARY BYPASS GRAFT: Chronic | ICD-10-CM

## 2023-10-04 DIAGNOSIS — Z98.890 OTHER SPECIFIED POSTPROCEDURAL STATES: Chronic | ICD-10-CM

## 2023-10-04 DIAGNOSIS — M14.60 CHARCOT'S JOINT, UNSPECIFIED SITE: ICD-10-CM

## 2023-10-04 DIAGNOSIS — L97.412 NON-PRESSURE CHRONIC ULCER OF RIGHT HEEL AND MIDFOOT WITH FAT LAYER EXPOSED: ICD-10-CM

## 2023-10-04 DIAGNOSIS — I77.0 ARTERIOVENOUS FISTULA, ACQUIRED: Chronic | ICD-10-CM

## 2023-10-04 DIAGNOSIS — E11.621 TYPE 2 DIABETES MELLITUS WITH FOOT ULCER: ICD-10-CM
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
## 2023-10-04 DIAGNOSIS — Z95.828 PRESENCE OF OTHER VASCULAR IMPLANTS AND GRAFTS: Chronic | ICD-10-CM

## 2023-10-04 DIAGNOSIS — M79.89 OTHER SPECIFIED SOFT TISSUE DISORDERS: ICD-10-CM

## 2023-10-04 DIAGNOSIS — L97.422 NON-PRESSURE CHRONIC ULCER OF LEFT HEEL AND MIDFOOT WITH FAT LAYER EXPOSED: ICD-10-CM

## 2023-10-04 DIAGNOSIS — Z00.00 ENCOUNTER FOR GENERAL ADULT MEDICAL EXAMINATION WITHOUT ABNORMAL FINDINGS: ICD-10-CM

## 2023-10-04 PROCEDURE — 29580 STRAPPING UNNA BOOT: CPT | Mod: RT

## 2023-10-04 PROCEDURE — 29580 STRAPPING UNNA BOOT: CPT | Mod: 59,RT

## 2023-10-04 PROCEDURE — 29445 APPL RIGID TOT CNTC LEG CAST: CPT | Mod: 59,LT

## 2023-10-04 PROCEDURE — 29445 APPL RIGID TOT CNTC LEG CAST: CPT | Mod: LT

## 2023-10-04 PROCEDURE — 11042 DBRDMT SUBQ TIS 1ST 20SQCM/<: CPT

## 2023-10-04 PROCEDURE — 11720 DEBRIDE NAIL 1-5: CPT

## 2023-10-11 ENCOUNTER — APPOINTMENT (OUTPATIENT)
Dept: PODIATRY | Facility: CLINIC | Age: 63
End: 2023-10-11

## 2023-10-18 ENCOUNTER — OUTPATIENT (OUTPATIENT)
Dept: OUTPATIENT SERVICES | Facility: HOSPITAL | Age: 63
LOS: 1 days | End: 2023-10-18
Payer: MEDICARE

## 2023-10-18 ENCOUNTER — APPOINTMENT (OUTPATIENT)
Dept: PODIATRY | Facility: CLINIC | Age: 63
End: 2023-10-18
Payer: MEDICARE

## 2023-10-18 DIAGNOSIS — I77.0 ARTERIOVENOUS FISTULA, ACQUIRED: Chronic | ICD-10-CM

## 2023-10-18 DIAGNOSIS — Z95.828 PRESENCE OF OTHER VASCULAR IMPLANTS AND GRAFTS: Chronic | ICD-10-CM

## 2023-10-18 DIAGNOSIS — Z00.00 ENCOUNTER FOR GENERAL ADULT MEDICAL EXAMINATION WITHOUT ABNORMAL FINDINGS: ICD-10-CM

## 2023-10-18 PROCEDURE — 29580 STRAPPING UNNA BOOT: CPT | Mod: 59,RT

## 2023-10-18 PROCEDURE — 29445 APPL RIGID TOT CNTC LEG CAST: CPT | Mod: 59,LT

## 2023-10-18 PROCEDURE — 11042 DBRDMT SUBQ TIS 1ST 20SQCM/<: CPT

## 2023-10-18 PROCEDURE — 29580 STRAPPING UNNA BOOT: CPT | Mod: RT

## 2023-10-18 PROCEDURE — 29445 APPL RIGID TOT CNTC LEG CAST: CPT | Mod: LT

## 2023-10-24 DIAGNOSIS — L97.412 NON-PRESSURE CHRONIC ULCER OF RIGHT HEEL AND MIDFOOT WITH FAT LAYER EXPOSED: ICD-10-CM
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
## 2023-10-24 DIAGNOSIS — M79.89 OTHER SPECIFIED SOFT TISSUE DISORDERS: ICD-10-CM

## 2023-10-24 DIAGNOSIS — E11.621 TYPE 2 DIABETES MELLITUS WITH FOOT ULCER: ICD-10-CM

## 2023-10-24 DIAGNOSIS — M14.60 CHARCOT'S JOINT, UNSPECIFIED SITE: ICD-10-CM

## 2023-10-24 DIAGNOSIS — L97.422 NON-PRESSURE CHRONIC ULCER OF LEFT HEEL AND MIDFOOT WITH FAT LAYER EXPOSED: ICD-10-CM

## 2023-10-25 ENCOUNTER — OUTPATIENT (OUTPATIENT)
Dept: OUTPATIENT SERVICES | Facility: HOSPITAL | Age: 63
LOS: 1 days | End: 2023-10-25
Payer: MEDICARE

## 2023-10-25 ENCOUNTER — APPOINTMENT (OUTPATIENT)
Dept: PODIATRY | Facility: CLINIC | Age: 63
End: 2023-10-25
Payer: MEDICARE

## 2023-10-25 DIAGNOSIS — Z95.828 PRESENCE OF OTHER VASCULAR IMPLANTS AND GRAFTS: Chronic | ICD-10-CM

## 2023-10-25 DIAGNOSIS — Z00.00 ENCOUNTER FOR GENERAL ADULT MEDICAL EXAMINATION WITHOUT ABNORMAL FINDINGS: ICD-10-CM

## 2023-10-25 DIAGNOSIS — I77.0 ARTERIOVENOUS FISTULA, ACQUIRED: Chronic | ICD-10-CM

## 2023-10-25 DIAGNOSIS — Z95.1 PRESENCE OF AORTOCORONARY BYPASS GRAFT: Chronic | ICD-10-CM

## 2023-10-25 DIAGNOSIS — Z98.890 OTHER SPECIFIED POSTPROCEDURAL STATES: Chronic | ICD-10-CM

## 2023-10-25 PROCEDURE — 29445 APPL RIGID TOT CNTC LEG CAST: CPT | Mod: LT,59

## 2023-10-25 PROCEDURE — 29445 APPL RIGID TOT CNTC LEG CAST: CPT | Mod: LT

## 2023-10-25 PROCEDURE — 11042 DBRDMT SUBQ TIS 1ST 20SQCM/<: CPT

## 2023-10-25 PROCEDURE — 29581 APPL MULTLAYER CMPRN SYS LEG: CPT | Mod: 59,RT

## 2023-10-25 PROCEDURE — 29581 APPL MULTLAYER CMPRN SYS LEG: CPT | Mod: RT

## 2023-10-27 DIAGNOSIS — M14.60 CHARCOT'S JOINT, UNSPECIFIED SITE: ICD-10-CM

## 2023-10-27 DIAGNOSIS — E11.621 TYPE 2 DIABETES MELLITUS WITH FOOT ULCER: ICD-10-CM

## 2023-10-27 DIAGNOSIS — L97.422 NON-PRESSURE CHRONIC ULCER OF LEFT HEEL AND MIDFOOT WITH FAT LAYER EXPOSED: ICD-10-CM
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
## 2023-10-27 DIAGNOSIS — M79.89 OTHER SPECIFIED SOFT TISSUE DISORDERS: ICD-10-CM

## 2023-10-27 DIAGNOSIS — L97.412 NON-PRESSURE CHRONIC ULCER OF RIGHT HEEL AND MIDFOOT WITH FAT LAYER EXPOSED: ICD-10-CM

## 2023-10-27 DIAGNOSIS — I73.9 PERIPHERAL VASCULAR DISEASE, UNSPECIFIED: ICD-10-CM

## 2023-11-01 ENCOUNTER — APPOINTMENT (OUTPATIENT)
Dept: PODIATRY | Facility: CLINIC | Age: 63
End: 2023-11-01
Payer: MEDICAID

## 2023-11-01 ENCOUNTER — OUTPATIENT (OUTPATIENT)
Dept: OUTPATIENT SERVICES | Facility: HOSPITAL | Age: 63
LOS: 1 days | End: 2023-11-01
Payer: MEDICARE

## 2023-11-01 DIAGNOSIS — Z00.00 ENCOUNTER FOR GENERAL ADULT MEDICAL EXAMINATION WITHOUT ABNORMAL FINDINGS: ICD-10-CM

## 2023-11-01 DIAGNOSIS — Z98.890 OTHER SPECIFIED POSTPROCEDURAL STATES: Chronic | ICD-10-CM

## 2023-11-01 DIAGNOSIS — I77.0 ARTERIOVENOUS FISTULA, ACQUIRED: Chronic | ICD-10-CM

## 2023-11-01 DIAGNOSIS — Z95.828 PRESENCE OF OTHER VASCULAR IMPLANTS AND GRAFTS: Chronic | ICD-10-CM

## 2023-11-01 DIAGNOSIS — L97.412 NON-PRESSURE CHRONIC ULCER OF RIGHT HEEL AND MIDFOOT WITH FAT LAYER EXPOSED: ICD-10-CM

## 2023-11-01 DIAGNOSIS — Z95.1 PRESENCE OF AORTOCORONARY BYPASS GRAFT: Chronic | ICD-10-CM

## 2023-11-01 PROCEDURE — 29581 APPL MULTLAYER CMPRN SYS LEG: CPT | Mod: RT

## 2023-11-01 PROCEDURE — 29445 APPL RIGID TOT CNTC LEG CAST: CPT | Mod: 59,LT

## 2023-11-01 PROCEDURE — 11042 DBRDMT SUBQ TIS 1ST 20SQCM/<: CPT

## 2023-11-01 PROCEDURE — 29445 APPL RIGID TOT CNTC LEG CAST: CPT | Mod: LT

## 2023-11-01 PROCEDURE — 29581 APPL MULTLAYER CMPRN SYS LEG: CPT | Mod: 59,RT

## 2023-11-01 PROCEDURE — 11042 DBRDMT SUBQ TIS 1ST 20SQCM/<: CPT | Mod: LT

## 2023-11-08 ENCOUNTER — OUTPATIENT (OUTPATIENT)
Dept: OUTPATIENT SERVICES | Facility: HOSPITAL | Age: 63
LOS: 1 days | End: 2023-11-08
Payer: MEDICARE

## 2023-11-08 ENCOUNTER — APPOINTMENT (OUTPATIENT)
Dept: PODIATRY | Facility: CLINIC | Age: 63
End: 2023-11-08
Payer: MEDICARE

## 2023-11-08 DIAGNOSIS — Z95.1 PRESENCE OF AORTOCORONARY BYPASS GRAFT: Chronic | ICD-10-CM

## 2023-11-08 DIAGNOSIS — Z00.00 ENCOUNTER FOR GENERAL ADULT MEDICAL EXAMINATION WITHOUT ABNORMAL FINDINGS: ICD-10-CM

## 2023-11-08 DIAGNOSIS — Z98.890 OTHER SPECIFIED POSTPROCEDURAL STATES: Chronic | ICD-10-CM

## 2023-11-08 DIAGNOSIS — Z95.828 PRESENCE OF OTHER VASCULAR IMPLANTS AND GRAFTS: Chronic | ICD-10-CM

## 2023-11-08 DIAGNOSIS — I77.0 ARTERIOVENOUS FISTULA, ACQUIRED: Chronic | ICD-10-CM

## 2023-11-08 PROCEDURE — 11042 DBRDMT SUBQ TIS 1ST 20SQCM/<: CPT

## 2023-11-08 PROCEDURE — 29445 APPL RIGID TOT CNTC LEG CAST: CPT | Mod: LT

## 2023-11-08 PROCEDURE — 29445 APPL RIGID TOT CNTC LEG CAST: CPT | Mod: LT,59

## 2023-11-13 DIAGNOSIS — M79.89 OTHER SPECIFIED SOFT TISSUE DISORDERS: ICD-10-CM

## 2023-11-13 DIAGNOSIS — L97.422 NON-PRESSURE CHRONIC ULCER OF LEFT HEEL AND MIDFOOT WITH FAT LAYER EXPOSED: ICD-10-CM
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
## 2023-11-13 DIAGNOSIS — L97.412 NON-PRESSURE CHRONIC ULCER OF RIGHT HEEL AND MIDFOOT WITH FAT LAYER EXPOSED: ICD-10-CM

## 2023-11-13 DIAGNOSIS — M14.60 CHARCOT'S JOINT, UNSPECIFIED SITE: ICD-10-CM

## 2023-11-13 DIAGNOSIS — E11.621 TYPE 2 DIABETES MELLITUS WITH FOOT ULCER: ICD-10-CM
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
## 2023-11-15 ENCOUNTER — OUTPATIENT (OUTPATIENT)
Dept: OUTPATIENT SERVICES | Facility: HOSPITAL | Age: 63
LOS: 1 days | End: 2023-11-15
Payer: MEDICARE

## 2023-11-15 ENCOUNTER — APPOINTMENT (OUTPATIENT)
Dept: PODIATRY | Facility: CLINIC | Age: 63
End: 2023-11-15
Payer: MEDICARE

## 2023-11-15 DIAGNOSIS — Z00.00 ENCOUNTER FOR GENERAL ADULT MEDICAL EXAMINATION WITHOUT ABNORMAL FINDINGS: ICD-10-CM

## 2023-11-15 DIAGNOSIS — Z95.828 PRESENCE OF OTHER VASCULAR IMPLANTS AND GRAFTS: Chronic | ICD-10-CM

## 2023-11-15 DIAGNOSIS — I77.0 ARTERIOVENOUS FISTULA, ACQUIRED: Chronic | ICD-10-CM

## 2023-11-15 DIAGNOSIS — Z95.1 PRESENCE OF AORTOCORONARY BYPASS GRAFT: Chronic | ICD-10-CM

## 2023-11-15 PROCEDURE — 29445 APPL RIGID TOT CNTC LEG CAST: CPT | Mod: LT

## 2023-11-16 DIAGNOSIS — E11.621 TYPE 2 DIABETES MELLITUS WITH FOOT ULCER: ICD-10-CM
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
## 2023-11-16 DIAGNOSIS — L97.422 NON-PRESSURE CHRONIC ULCER OF LEFT HEEL AND MIDFOOT WITH FAT LAYER EXPOSED: ICD-10-CM

## 2023-11-16 DIAGNOSIS — M14.60 CHARCOT'S JOINT, UNSPECIFIED SITE: ICD-10-CM

## 2023-11-29 ENCOUNTER — OUTPATIENT (OUTPATIENT)
Dept: OUTPATIENT SERVICES | Facility: HOSPITAL | Age: 63
LOS: 1 days | End: 2023-11-29
Payer: MEDICARE

## 2023-11-29 ENCOUNTER — APPOINTMENT (OUTPATIENT)
Dept: PODIATRY | Facility: CLINIC | Age: 63
End: 2023-11-29
Payer: MEDICARE
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
## 2023-11-29 DIAGNOSIS — M14.60 CHARCOT'S JOINT, UNSPECIFIED SITE: ICD-10-CM

## 2023-11-29 DIAGNOSIS — L60.2 ONYCHOGRYPHOSIS: ICD-10-CM

## 2023-11-29 DIAGNOSIS — Z98.890 OTHER SPECIFIED POSTPROCEDURAL STATES: Chronic | ICD-10-CM

## 2023-11-29 DIAGNOSIS — Z95.828 PRESENCE OF OTHER VASCULAR IMPLANTS AND GRAFTS: Chronic | ICD-10-CM

## 2023-11-29 DIAGNOSIS — L60.3 NAIL DYSTROPHY: ICD-10-CM

## 2023-11-29 DIAGNOSIS — Z00.00 ENCOUNTER FOR GENERAL ADULT MEDICAL EXAMINATION WITHOUT ABNORMAL FINDINGS: ICD-10-CM
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
## 2023-11-29 DIAGNOSIS — E11.621 TYPE 2 DIABETES MELLITUS WITH FOOT ULCER: ICD-10-CM

## 2023-11-29 DIAGNOSIS — I77.0 ARTERIOVENOUS FISTULA, ACQUIRED: Chronic | ICD-10-CM

## 2023-11-29 DIAGNOSIS — Z95.1 PRESENCE OF AORTOCORONARY BYPASS GRAFT: Chronic | ICD-10-CM

## 2023-11-29 DIAGNOSIS — L97.422 NON-PRESSURE CHRONIC ULCER OF LEFT HEEL AND MIDFOOT WITH FAT LAYER EXPOSED: ICD-10-CM

## 2023-11-29 PROCEDURE — 11721 DEBRIDE NAIL 6 OR MORE: CPT

## 2023-12-04 DIAGNOSIS — L60.3 NAIL DYSTROPHY: ICD-10-CM
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
## 2023-12-04 DIAGNOSIS — I73.9 PERIPHERAL VASCULAR DISEASE, UNSPECIFIED: ICD-10-CM

## 2023-12-04 DIAGNOSIS — E11.42 TYPE 2 DIABETES MELLITUS WITH DIABETIC POLYNEUROPATHY: ICD-10-CM

## 2023-12-04 DIAGNOSIS — L60.2 ONYCHOGRYPHOSIS: ICD-10-CM
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
## 2023-12-06 ENCOUNTER — APPOINTMENT (OUTPATIENT)
Dept: PODIATRY | Facility: CLINIC | Age: 63
End: 2023-12-06
Payer: MEDICARE

## 2023-12-06 ENCOUNTER — OUTPATIENT (OUTPATIENT)
Dept: OUTPATIENT SERVICES | Facility: HOSPITAL | Age: 63
LOS: 1 days | End: 2023-12-06
Payer: MEDICARE

## 2023-12-06 DIAGNOSIS — Z00.00 ENCOUNTER FOR GENERAL ADULT MEDICAL EXAMINATION WITHOUT ABNORMAL FINDINGS: ICD-10-CM

## 2023-12-06 DIAGNOSIS — Z95.1 PRESENCE OF AORTOCORONARY BYPASS GRAFT: Chronic | ICD-10-CM

## 2023-12-06 DIAGNOSIS — Z95.828 PRESENCE OF OTHER VASCULAR IMPLANTS AND GRAFTS: Chronic | ICD-10-CM

## 2023-12-06 DIAGNOSIS — Z98.890 OTHER SPECIFIED POSTPROCEDURAL STATES: Chronic | ICD-10-CM

## 2023-12-06 DIAGNOSIS — I77.0 ARTERIOVENOUS FISTULA, ACQUIRED: Chronic | ICD-10-CM

## 2023-12-06 PROCEDURE — 11042 DBRDMT SUBQ TIS 1ST 20SQCM/<: CPT

## 2023-12-06 PROCEDURE — 29580 STRAPPING UNNA BOOT: CPT | Mod: 59,LT

## 2023-12-06 PROCEDURE — 29580 STRAPPING UNNA BOOT: CPT | Mod: 59

## 2023-12-06 PROCEDURE — 11042 DBRDMT SUBQ TIS 1ST 20SQCM/<: CPT | Mod: LT
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
## 2023-12-08 DIAGNOSIS — L97.422 NON-PRESSURE CHRONIC ULCER OF LEFT HEEL AND MIDFOOT WITH FAT LAYER EXPOSED: ICD-10-CM

## 2023-12-08 DIAGNOSIS — M79.89 OTHER SPECIFIED SOFT TISSUE DISORDERS: ICD-10-CM
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
## 2023-12-08 DIAGNOSIS — E11.621 TYPE 2 DIABETES MELLITUS WITH FOOT ULCER: ICD-10-CM

## 2023-12-13 ENCOUNTER — OUTPATIENT (OUTPATIENT)
Dept: OUTPATIENT SERVICES | Facility: HOSPITAL | Age: 63
LOS: 1 days | End: 2023-12-13
Payer: MEDICARE

## 2023-12-13 ENCOUNTER — APPOINTMENT (OUTPATIENT)
Dept: PODIATRY | Facility: CLINIC | Age: 63
End: 2023-12-13
Payer: MEDICARE

## 2023-12-13 DIAGNOSIS — I77.0 ARTERIOVENOUS FISTULA, ACQUIRED: Chronic | ICD-10-CM

## 2023-12-13 DIAGNOSIS — M14.60 CHARCOT'S JOINT, UNSPECIFIED SITE: ICD-10-CM

## 2023-12-13 DIAGNOSIS — M79.89 OTHER SPECIFIED SOFT TISSUE DISORDERS: ICD-10-CM
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
## 2023-12-13 DIAGNOSIS — L97.811 NON-PRESSURE CHRONIC ULCER OF OTHER PART OF RIGHT LOWER LEG LIMITED TO BREAKDOWN OF SKIN: ICD-10-CM

## 2023-12-13 DIAGNOSIS — Z00.00 ENCOUNTER FOR GENERAL ADULT MEDICAL EXAMINATION WITHOUT ABNORMAL FINDINGS: ICD-10-CM

## 2023-12-13 DIAGNOSIS — Z98.890 OTHER SPECIFIED POSTPROCEDURAL STATES: Chronic | ICD-10-CM

## 2023-12-13 DIAGNOSIS — Z95.1 PRESENCE OF AORTOCORONARY BYPASS GRAFT: Chronic | ICD-10-CM

## 2023-12-13 PROCEDURE — 29580 STRAPPING UNNA BOOT: CPT | Mod: RT

## 2023-12-13 NOTE — PROGRESS NOTE ADULT - SUBJECTIVE AND OBJECTIVE BOX
LAWRENCE SCHWABACHER 61y Male  MRN#: [id number]   CODE STATUS:________    Hospital Day: 7d    Pt is currently admitted with the primary diagnosis of     SUBJECTIVE  Hospital Course    Overnight events     Subjective complaints     Present Today:   - Deepthi:  No [  ], Yes [   ] : Indication:     - Type of IV Access:       .. CVC/Piccline:  No [  ], Yes [   ] : Indication:       .. Midline: No [  ], Yes [   ] : Indication:                                             ----------------------------------------------------------  OBJECTIVE  PAST MEDICAL & SURGICAL HISTORY  CAD (coronary artery disease)    S/P CABG (coronary artery bypass graft)  x4    Diabetes mellitus    Transient ischemic attack (TIA)  2017; 2008    Chronic kidney disease (CKD)  Stage IV    Hypertension    Stented coronary artery  in 2008    Myocardial infarction  2012    PAD (peripheral artery disease)  S/p bypass left leg    HLD (hyperlipidemia)    BPH (benign prostatic hyperplasia)    Pain in left knee  s/p fall    OA (osteoarthritis)    [place] (hard of hearing)    Chronic anemia    S/P CABG (coronary artery bypass graft)  2012    H/O arterial bypass of lower limb  Left Lower Extremity (2016)    History of surgery  Left CEA (2017)  Left Pinkie toe Amputation (2014)  CABG x 4 (2012)  Card cath - stent (2008)                                                -----------------------------------------------------------  ALLERGIES:  No Known Allergies                                            ------------------------------------------------------------    HOME MEDICATIONS  Home Medications:  aspirin 81 mg oral tablet: 1 tab(s) orally once a day (21 Feb 2022 13:33)  furosemide 80 mg oral tablet: 1 tab(s) orally 2 times a day (21 Feb 2022 13:33)  Levemir 100 units/mL subcutaneous solution: 40 unit(s) subcutaneous once a day (at bedtime).  (21 Feb 2022 13:33)  Metoprolol Tartrate 50 mg oral tablet: 1 tab(s) orally 2 times a day (21 Feb 2022 13:33)  Plavix 75 mg oral tablet: 1 tab(s) orally once a day (21 Feb 2022 13:33)  Trulicity Pen: 1 milligram(s) subcutaneous once a week (21 Feb 2022 13:33)                           MEDICATIONS:  STANDING MEDICATIONS  aspirin enteric coated 81 milliGRAM(s) Oral daily  atorvastatin 40 milliGRAM(s) Oral at bedtime  buMETAnide 2 milliGRAM(s) Oral <User Schedule>  buMETAnide 2 milliGRAM(s) Oral <User Schedule>  chlorhexidine 4% Liquid 1 Application(s) Topical <User Schedule>  clopidogrel Tablet 75 milliGRAM(s) Oral daily  collagenase Ointment 1 Application(s) Topical daily  dextrose 40% Gel 15 Gram(s) Oral once  dextrose 5%. 1000 milliLiter(s) IV Continuous <Continuous>  dextrose 5%. 1000 milliLiter(s) IV Continuous <Continuous>  dextrose 50% Injectable 25 Gram(s) IV Push once  dextrose 50% Injectable 12.5 Gram(s) IV Push once  dextrose 50% Injectable 25 Gram(s) IV Push once  glucagon  Injectable 1 milliGRAM(s) IntraMuscular once  heparin   Injectable 5000 Unit(s) SubCutaneous every 12 hours  insulin lispro (ADMELOG) corrective regimen sliding scale   SubCutaneous three times a day before meals  insulin lispro Injectable (ADMELOG) 3 Unit(s) SubCutaneous three times a day before meals  lactated ringers. 1000 milliLiter(s) IV Continuous <Continuous>  melatonin 5 milliGRAM(s) Oral at bedtime  metoprolol tartrate 50 milliGRAM(s) Oral two times a day  NIFEdipine XL 60 milliGRAM(s) Oral daily  pantoprazole    Tablet 40 milliGRAM(s) Oral before breakfast  sevelamer carbonate 800 milliGRAM(s) Oral three times a day with meals    PRN MEDICATIONS                                            ------------------------------------------------------------  VITAL SIGNS: Last 24 Hours  T(C): 36.4 (28 Feb 2022 05:34), Max: 36.6 (27 Feb 2022 21:42)  T(F): 97.6 (28 Feb 2022 05:34), Max: 97.9 (27 Feb 2022 21:42)  HR: 69 (28 Feb 2022 08:25) (62 - 75)  BP: 161/82 (28 Feb 2022 08:25) (125/58 - 173/80)  BP(mean): --  RR: 18 (28 Feb 2022 08:25) (17 - 18)  SpO2: --                                             --------------------------------------------------------------  LABS:                        9.2    8.28  )-----------( 182      ( 27 Feb 2022 20:00 )             29.0     02-27    136  |  96<L>  |  75<HH>  ----------------------------<  162<H>  4.9   |  20  |  7.8<HH>    Ca    6.8<L>      27 Feb 2022 20:00  Phos  6.4     02-28  Mg     2.2     02-27    TPro  6.9  /  Alb  3.8  /  TBili  0.4  /  DBili  x   /  AST  13  /  ALT  13  /  AlkPhos  133<H>  02-27    PT/INR - ( 27 Feb 2022 20:00 )   PT: 13.10 sec;   INR: 1.14 ratio         PTT - ( 27 Feb 2022 20:00 )  PTT:31.4 sec            Culture - Abscess with Gram Stain (collected 26 Feb 2022 10:42)  Source: .Abscess r heel  Preliminary Report (27 Feb 2022 17:02):    Moderate Escherichia coli    Moderate Enterobacter cloacae    Moderate Enterococcus faecalis                                                    -------------------------------------------------------------  RADIOLOGY:                                            --------------------------------------------------------------    PHYSICAL EXAM:  General: Well appearing, not in acute distress  HEENT: No cervical LAD, or JVD  LUNGS: CTAB, no rales, on RA  HEART: RRR, no murmur  ABDOMEN: NBS, soft, nontender to palpation  EXT: no peripheral pitting edema b/l  NEURO: AAOx3  SKIN: No skin rash, open wounds                                           --------------------------------------------------------------    ASSESSMENT & PLAN    Past medical history and hospital course                                                                                                           ----------------------------------------------------  # DVT prophylaxis     # GI prophylaxis     # Diet     # Activity Score (AM-PAC)    # Code status     # Disposition                                                                              --------------------------------------------------------    # Handoff      LAWRENCE SCHWABACHER 61y Male  MRN#: [id number]   CODE STATUS: full code    Hospital Day: 7d    Pt is currently admitted with the primary diagnosis of fluid overload     SUBJECTIVE  Hospital Course    Overnight events: 24 hour events noted. NPO, preop-labs performed. No subjective complaints. Receiving dialysis this morning, and will proceed with angiogram.     Subjective complaints                                               ----------------------------------------------------------  OBJECTIVE  PAST MEDICAL & SURGICAL HISTORY  CAD (coronary artery disease)    S/P CABG (coronary artery bypass graft)  x4    Diabetes mellitus    Transient ischemic attack (TIA)  2017; 2008    Chronic kidney disease (CKD)  Stage IV    Hypertension    Stented coronary artery  in 2008    Myocardial infarction  2012    PAD (peripheral artery disease)  S/p bypass left leg    HLD (hyperlipidemia)    BPH (benign prostatic hyperplasia)    Pain in left knee  s/p fall    OA (osteoarthritis)    [place] (hard of hearing)    Chronic anemia    S/P CABG (coronary artery bypass graft)  2012    H/O arterial bypass of lower limb  Left Lower Extremity (2016)    History of surgery  Left CEA (2017)  Left Pinkie toe Amputation (2014)  CABG x 4 (2012)  Card cath - stent (2008)                                                -----------------------------------------------------------  ALLERGIES:  No Known Allergies                                            ------------------------------------------------------------    HOME MEDICATIONS  Home Medications:  aspirin 81 mg oral tablet: 1 tab(s) orally once a day (21 Feb 2022 13:33)  furosemide 80 mg oral tablet: 1 tab(s) orally 2 times a day (21 Feb 2022 13:33)  Levemir 100 units/mL subcutaneous solution: 40 unit(s) subcutaneous once a day (at bedtime).  (21 Feb 2022 13:33)  Metoprolol Tartrate 50 mg oral tablet: 1 tab(s) orally 2 times a day (21 Feb 2022 13:33)  Plavix 75 mg oral tablet: 1 tab(s) orally once a day (21 Feb 2022 13:33)  Trulicity Pen: 1 milligram(s) subcutaneous once a week (21 Feb 2022 13:33)                           MEDICATIONS:  STANDING MEDICATIONS  aspirin enteric coated 81 milliGRAM(s) Oral daily  atorvastatin 40 milliGRAM(s) Oral at bedtime  buMETAnide 2 milliGRAM(s) Oral <User Schedule>  buMETAnide 2 milliGRAM(s) Oral <User Schedule>  chlorhexidine 4% Liquid 1 Application(s) Topical <User Schedule>  clopidogrel Tablet 75 milliGRAM(s) Oral daily  collagenase Ointment 1 Application(s) Topical daily  dextrose 40% Gel 15 Gram(s) Oral once  dextrose 5%. 1000 milliLiter(s) IV Continuous <Continuous>  dextrose 5%. 1000 milliLiter(s) IV Continuous <Continuous>  dextrose 50% Injectable 25 Gram(s) IV Push once  dextrose 50% Injectable 12.5 Gram(s) IV Push once  dextrose 50% Injectable 25 Gram(s) IV Push once  glucagon  Injectable 1 milliGRAM(s) IntraMuscular once  heparin   Injectable 5000 Unit(s) SubCutaneous every 12 hours  insulin lispro (ADMELOG) corrective regimen sliding scale   SubCutaneous three times a day before meals  insulin lispro Injectable (ADMELOG) 3 Unit(s) SubCutaneous three times a day before meals  lactated ringers. 1000 milliLiter(s) IV Continuous <Continuous>  melatonin 5 milliGRAM(s) Oral at bedtime  metoprolol tartrate 50 milliGRAM(s) Oral two times a day  NIFEdipine XL 60 milliGRAM(s) Oral daily  pantoprazole    Tablet 40 milliGRAM(s) Oral before breakfast  sevelamer carbonate 800 milliGRAM(s) Oral three times a day with meals    PRN MEDICATIONS                                            ------------------------------------------------------------  VITAL SIGNS: Last 24 Hours  T(C): 36.4 (28 Feb 2022 05:34), Max: 36.6 (27 Feb 2022 21:42)  T(F): 97.6 (28 Feb 2022 05:34), Max: 97.9 (27 Feb 2022 21:42)  HR: 69 (28 Feb 2022 08:25) (62 - 75)  BP: 161/82 (28 Feb 2022 08:25) (125/58 - 173/80)  BP(mean): --  RR: 18 (28 Feb 2022 08:25) (17 - 18)  SpO2: --                                             --------------------------------------------------------------  LABS:                        9.2    8.28  )-----------( 182      ( 27 Feb 2022 20:00 )             29.0     02-27    136  |  96<L>  |  75<HH>  ----------------------------<  162<H>  4.9   |  20  |  7.8<HH>    Ca    6.8<L>      27 Feb 2022 20:00  Phos  6.4     02-28  Mg     2.2     02-27    TPro  6.9  /  Alb  3.8  /  TBili  0.4  /  DBili  x   /  AST  13  /  ALT  13  /  AlkPhos  133<H>  02-27    PT/INR - ( 27 Feb 2022 20:00 )   PT: 13.10 sec;   INR: 1.14 ratio         PTT - ( 27 Feb 2022 20:00 )  PTT:31.4 sec            Culture - Abscess with Gram Stain (collected 26 Feb 2022 10:42)  Source: .Abscess r heel  Preliminary Report (27 Feb 2022 17:02):    Moderate Escherichia coli    Moderate Enterobacter cloacae    Moderate Enterococcus faecalis                                                    -------------------------------------------------------------  RADIOLOGY:                                            --------------------------------------------------------------    PHYSICAL EXAM:  General: Well appearing, not in acute distress  HEENT: No cervical LAD, or JVD  LUNGS: CTAB, no rales, on RA  HEART: RRR, no murmur  ABDOMEN: NBS, soft, nontender to palpation  EXT: mild peripheral pitting edema b/l, unchanged  NEURO: AAOx3  SKIN: right foot wound bandaged.                                            --------------------------------------------------------------         Simple: Patient demonstrates quick and easy understanding/Patient asked questions/Returned Demonstration/Verbalized Understanding

## 2023-12-20 ENCOUNTER — OUTPATIENT (OUTPATIENT)
Dept: OUTPATIENT SERVICES | Facility: HOSPITAL | Age: 63
LOS: 1 days | End: 2023-12-20
Payer: MEDICARE

## 2023-12-20 ENCOUNTER — APPOINTMENT (OUTPATIENT)
Dept: PODIATRY | Facility: CLINIC | Age: 63
End: 2023-12-20
Payer: MEDICARE

## 2023-12-20 DIAGNOSIS — I77.0 ARTERIOVENOUS FISTULA, ACQUIRED: Chronic | ICD-10-CM

## 2023-12-20 DIAGNOSIS — Z95.828 PRESENCE OF OTHER VASCULAR IMPLANTS AND GRAFTS: Chronic | ICD-10-CM

## 2023-12-20 DIAGNOSIS — L84 CORNS AND CALLOSITIES: ICD-10-CM

## 2023-12-20 DIAGNOSIS — Z00.00 ENCOUNTER FOR GENERAL ADULT MEDICAL EXAMINATION WITHOUT ABNORMAL FINDINGS: ICD-10-CM

## 2023-12-20 DIAGNOSIS — Z98.890 OTHER SPECIFIED POSTPROCEDURAL STATES: Chronic | ICD-10-CM

## 2023-12-20 PROCEDURE — 11055 PARING/CUTG B9 HYPRKER LES 1: CPT | Mod: Q9

## 2023-12-20 PROCEDURE — 11055 PARING/CUTG B9 HYPRKER LES 1: CPT

## 2023-12-20 PROCEDURE — 29580 STRAPPING UNNA BOOT: CPT | Mod: LT

## 2023-12-20 PROCEDURE — 29580 STRAPPING UNNA BOOT: CPT

## 2023-12-20 NOTE — ASSESSMENT
[FreeTextEntry1] : Alfonso archibald LLE  RTO 1 week [Verbal] : verbal [Patient] : patient [Good - alert, interested, motivated] : Good - alert, interested, motivated

## 2023-12-20 NOTE — PROCEDURE
[] : A well-padded Unna Boot was applied to the left lower extremity. [FreeTextEntry1] : dressings removed. mild debridement of callous to left heel. bilateral legs all wounds essentially healed. duoderm placed on right calf to allow skin to mature. left heel dressed with bacitracin,2x2,omnifix to protect skin. legs moisturized with a and d ointment. right tubigrip applied. left l.e. unna boot,coban and tubigrip. pt using darco shoes. pt to return next week.

## 2023-12-20 NOTE — PHYSICAL EXAM
[Foot Ulcer] : foot ulcer [Ankle Swelling (On Exam)] : present [Ankle Swelling Bilaterally] : bilaterally  [Ankle Swelling On The Left] : moderate [FreeTextEntry3] : Diminished pulses B/L LE [de-identified] : Rocker bottom deformity L foot  L 5th toe amp [FreeTextEntry1] : right calf skin tears x 2, superficial  [Diminished Throughout Right Foot] : diminished sensation with monofilament testing throughout right foot [Diminished Throughout Left Foot] : diminished sensation with monofilament testing throughout left foot

## 2023-12-20 NOTE — PROCEDURE
[] : A well-padded Unna Boot was applied to the left lower extremity. [FreeTextEntry1] : dressings removed. left heel remains healed. dressed with bacitracin,adaptic,2x2,omnifix to protect skin. leg moisturized, Unna boot applied to decrease edema. right calf wounds cleaned and dressed with duoderm,omnifix. tubigrip to help decrease edema. pt doing well. will return next week.

## 2023-12-20 NOTE — ASSESSMENT
[FreeTextEntry1] : Unna boot LLE  Dbx of callus x 1 rx Shoes with costume molded insert  RTO 1 week [Verbal] : verbal [Patient] : patient [Good - alert, interested, motivated] : Good - alert, interested, motivated

## 2023-12-20 NOTE — PHYSICAL EXAM
[Skin Lesions] : no skin lesions [Ankle Swelling (On Exam)] : present [Ankle Swelling On The Right] : of the right ankle [Ankle Swelling On The Left] : moderate [FreeTextEntry3] : Diminished pulses B/L LE [de-identified] : Rocker bottom deformity L foot  L 5th toe amp [FreeTextEntry1] : Superficial RLE wounds, no sings of infection Swelling RLE  Callus L Foot  [Diminished Throughout Right Foot] : diminished sensation with monofilament testing throughout right foot [Diminished Throughout Left Foot] : diminished sensation with monofilament testing throughout left foot

## 2023-12-20 NOTE — HISTORY OF PRESENT ILLNESS
[Wheelchair] : a wheelchair [FreeTextEntry1] : L Heel ulcer -  wound care, and TCC  - hx of 5th toe amp L foot

## 2023-12-27 ENCOUNTER — APPOINTMENT (OUTPATIENT)
Dept: PODIATRY | Facility: CLINIC | Age: 63
End: 2023-12-27
Payer: MEDICARE

## 2023-12-27 ENCOUNTER — OUTPATIENT (OUTPATIENT)
Dept: OUTPATIENT SERVICES | Facility: HOSPITAL | Age: 63
LOS: 1 days | End: 2023-12-27
Payer: MEDICARE

## 2023-12-27 DIAGNOSIS — Z00.00 ENCOUNTER FOR GENERAL ADULT MEDICAL EXAMINATION WITHOUT ABNORMAL FINDINGS: ICD-10-CM

## 2023-12-27 DIAGNOSIS — I77.0 ARTERIOVENOUS FISTULA, ACQUIRED: Chronic | ICD-10-CM

## 2023-12-27 DIAGNOSIS — Z98.890 OTHER SPECIFIED POSTPROCEDURAL STATES: Chronic | ICD-10-CM

## 2023-12-27 DIAGNOSIS — Z95.1 PRESENCE OF AORTOCORONARY BYPASS GRAFT: Chronic | ICD-10-CM

## 2023-12-27 PROCEDURE — 97763 ORTHC/PROSTC MGMT SBSQ ENC: CPT

## 2023-12-27 PROCEDURE — 97763 ORTHC/PROSTC MGMT SBSQ ENC: CPT | Mod: LT,GP

## 2023-12-28 NOTE — PHYSICAL EXAM
[Skin Lesions] : no skin lesions [Ankle Swelling (On Exam)] : present [Ankle Swelling On The Left] : moderate [FreeTextEntry3] : Diminished pulses B/L LE [de-identified] : Rocker bottom deformity L foot  L 5th toe amp [FreeTextEntry1] : L Heel wound healed with epithelial tissue.  [Diminished Throughout Right Foot] : diminished sensation with monofilament testing throughout right foot [Diminished Throughout Left Foot] : diminished sensation with monofilament testing throughout left foot

## 2023-12-28 NOTE — HISTORY OF PRESENT ILLNESS
[Wheelchair] : a wheelchair [FreeTextEntry1] : L Heel ulcer -  wound care - hx of 5th toe amp L foot

## 2023-12-28 NOTE — PROCEDURE
[] : The left foot insert was adjusted to accommodate deformity and offload pressure baring region. Modification  of inserts were performed with use of plastazote, PPT, cork and poron. [FreeTextEntry1] : dressings removed. good skin integrity bilateral feet. left heel cushioned with si foam. former wound site dressed with bacitracin,2x2,omnifix to protect against friction. unna boot,coban and tubigrip applied to left to decrease chronic edema. pt will return here in 2 to 3 weeks.

## 2023-12-29 DIAGNOSIS — M79.89 OTHER SPECIFIED SOFT TISSUE DISORDERS: ICD-10-CM

## 2023-12-29 DIAGNOSIS — E11.42 TYPE 2 DIABETES MELLITUS WITH DIABETIC POLYNEUROPATHY: ICD-10-CM
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
## 2023-12-29 DIAGNOSIS — M14.60 CHARCOT'S JOINT, UNSPECIFIED SITE: ICD-10-CM

## 2023-12-29 DIAGNOSIS — L97.811 NON-PRESSURE CHRONIC ULCER OF OTHER PART OF RIGHT LOWER LEG LIMITED TO BREAKDOWN OF SKIN: ICD-10-CM
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
## 2023-12-29 DIAGNOSIS — Q66.80 CONGENITAL VERTICAL TALUS DEFORMITY, UNSPECIFIED FOOT: ICD-10-CM

## 2023-12-29 DIAGNOSIS — I73.9 PERIPHERAL VASCULAR DISEASE, UNSPECIFIED: ICD-10-CM

## 2023-12-29 DIAGNOSIS — L84 CORNS AND CALLOSITIES: ICD-10-CM

## 2024-01-01 NOTE — PROGRESS NOTE ADULT - ASSESSMENT
Patient presents to the nurses station yelling and demanding to leave. Attempted to redirect the patient multiple times without success. Redirected patient to room patient continues to yell hitting the walls.    Lead RN and security present for safety. Dr Shah notified.     New order for Haldol 5 mg IM q 6hrs prn Benadryl 50 mg Im q6hrs prn. Ativan 2 mg Im q6hrs for agitation ;  72 hr hold; Restraints if needed. RBVX2.         In attempt to enter PRN medication- epic will not allow to enter Ativan IM; Spoke with Pharmacy- Andrez Advised must enter a pharmacy message and will be entered by pharmacy related to ativan shortage.          ASSESSMENT & PLAN    60 yo M with PMHx of ESRD on HD M/W/F, Normocytic Anemia of Chronic Disease, DM Type II, Hypertension, DLD, TIA, CAD s/p staged PCI CABG 2012, PVD s/p left below knee popliteal to AT artery reverse saphenous vein graft in 4/2017 and balloon plasty of his left lower extremity bypass graft in 6/2020, and carotid stenosis s/p right carotid endarterectomy in 6/2017 sent in from o/p dialysis (prior to starting HD) for appearing pale. Admitted to inpatient for dyspnea 2/2 fluid overload, improved, still in hospital admission for podiatry procedure due to right heel ulcer osteomyelitis.     #Right hell ulcer: Osteomyelitis  - Xray ankle reviewed: posterior heel ulcer with no definitive evidence of osteomyelitis- there is mild periosteal reaction and would need MRI ankle for further evaluation  - Duplex venous reviewed: No DVT  - Duplex arterial reviewed: minimal flow in PT and peroneal arteries.   - Podiatry recs reviewed, vascular reviewed, ID recs reviewed. pending Monday angiogram--> per podiatry Tuesday 3/1 will have excisional debridement of soft tissue and bone right foot with wound bx --> after Cx obtained will do vancomycin 1.25 post HD on HD days, PO flagyl 500mg TID, cefepime 2g post HD on HD days    # ESRD on HD MWF, non-oliguric  # Fluid Overload; suspected due to diet/fluid intake+ incorrect use of home lasix; r/o cardiac cause  - did not miss any sessions as o/p; reviewed nephro recs. s/p HD 2 days in a row. will hold HD for AM today  - diet and fluid intake not ideal and seems meds were not being taken properly  - switched from lasix to PO bumex. nephro recs reviewed: continue bumex,continue nifedipine 60mg, d/c-ed lasix  - c/w sevelamer TID  - BNP >70K  - TTE 5/2021 with EF 50-55% and grade 1 diastolic dysfunction; f/u repeat echo    #Elevated trops- 0.19: likely 2/2 CKD  - no active chest pain sx  - lower than previous levels- 0.25      # Anemia chronic disease  - On admission Hb 9.9    # DM2  - takes lantus 40 units at night and trulicity weekly  - monitor FS and start insulin regimen if consistently >180    # HTN  - initially elevated in ED  - only on metoprolol 50mg BID at home  - monitor BP after HD    # DLD  - c/w statin    # CAD s/p staged PCI CABG 2012  # PVD s/p left below knee popliteal to AT artery reverse saphenous vein graft in 4/2017 and balloon plasty of his left lower extremity bypass graft in 6/2020 # Carotid stenosis s/p right carotid endarterectomy in 6/ 2017  - c/w asa 81mg, plavix, and BB    #DM2  - on SSI      DVT ppx: heparin SC  GI ppx: NA  Diet: NPO midnight, then DASH, CC, renal, fluid restricted  Code Status: Full Code  Handoff: dialysis this AM then Monday angiogram, then debridement, then abx ASSESSMENT & PLAN  60 yo M with PMHx of ESRD on HD M/W/F, Normocytic Anemia of Chronic Disease, DM Type II, Hypertension, DLD, TIA, CAD s/p staged PCI CABG 2012, PVD s/p left below knee popliteal to AT artery reverse saphenous vein graft in 4/2017 and balloon plasty of his left lower extremity bypass graft in 6/2020, and carotid stenosis s/p right carotid endarterectomy in 6/2017 sent in from o/p dialysis (prior to starting HD) for appearing pale. Admitted to inpatient for dyspnea 2/2 fluid overload, improved, still in hospital admission for podiatry procedure due to right heel ulcer osteomyelitis.     #Right hell ulcer: Osteomyelitis  - Xray ankle reviewed: posterior heel ulcer with no definitive evidence of osteomyelitis- there is mild periosteal reaction and would need MRI ankle for further evaluation  - Duplex venous reviewed: No DVT  - Duplex arterial reviewed: minimal flow in PT and peroneal arteries.   - Podiatry recs reviewed, vascular reviewed, ID recs reviewed. pending Monday angiogram--> per podiatry Tuesday 3/1 will have excisional debridement of soft tissue and bone right foot with wound bx --> after Cx obtained will do vancomycin 1.25 post HD on HD days, PO flagyl 500mg TID, cefepime 2g post HD on HD days    # ESRD on HD MWF, non-oliguric  # Fluid Overload; suspected due to diet/fluid intake+ incorrect use of home lasix; r/o cardiac cause  - did not miss any sessions as o/p; reviewed nephro recs. s/p HD 2 days in a row. will hold HD for AM today  - diet and fluid intake not ideal and seems meds were not being taken properly  - switched from lasix to PO bumex. nephro recs reviewed: continue bumex,continue nifedipine 60mg, d/c-ed lasix  - c/w sevelamer TID  - BNP >70K  - TTE 5/2021 with EF 50-55% and grade 1 diastolic dysfunction; f/u repeat echo    #Elevated trops- 0.19: likely 2/2 CKD  - no active chest pain sx  - lower than previous levels- 0.25      # Anemia chronic disease  - On admission Hb 9.9    # DM2  - takes lantus 40 units at night and trulicity weekly  - monitor FS and start insulin regimen if consistently >180    # HTN  - initially elevated in ED  - only on metoprolol 50mg BID at home  - monitor BP after HD    # DLD  - c/w statin    # CAD s/p staged PCI CABG 2012  # PVD s/p left below knee popliteal to AT artery reverse saphenous vein graft in 4/2017 and balloon plasty of his left lower extremity bypass graft in 6/2020 # Carotid stenosis s/p right carotid endarterectomy in 6/ 2017  - c/w asa 81mg, plavix, and BB    #DM2  - on SSI      DVT ppx: heparin SC  GI ppx: NA  Diet: NPO midnight, then DASH, CC, renal, fluid restricted  Code Status: Full Code  Handoff: dialysis this AM then Monday angiogram, then debridement, then abx

## 2024-01-10 ENCOUNTER — APPOINTMENT (OUTPATIENT)
Dept: PODIATRY | Facility: CLINIC | Age: 64
End: 2024-01-10
Payer: MEDICARE

## 2024-01-10 ENCOUNTER — OUTPATIENT (OUTPATIENT)
Dept: OUTPATIENT SERVICES | Facility: HOSPITAL | Age: 64
LOS: 1 days | End: 2024-01-10
Payer: MEDICARE

## 2024-01-10 DIAGNOSIS — Z00.00 ENCOUNTER FOR GENERAL ADULT MEDICAL EXAMINATION WITHOUT ABNORMAL FINDINGS: ICD-10-CM

## 2024-01-10 DIAGNOSIS — M14.60 CHARCOT'S JOINT, UNSPECIFIED SITE: ICD-10-CM

## 2024-01-10 DIAGNOSIS — I77.0 ARTERIOVENOUS FISTULA, ACQUIRED: Chronic | ICD-10-CM

## 2024-01-10 DIAGNOSIS — Z95.1 PRESENCE OF AORTOCORONARY BYPASS GRAFT: Chronic | ICD-10-CM
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
## 2024-01-10 DIAGNOSIS — L97.821 NON-PRESSURE CHRONIC ULCER OF OTHER PART OF LEFT LOWER LEG LIMITED TO BREAKDOWN OF SKIN: ICD-10-CM
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
## 2024-01-10 DIAGNOSIS — Z95.828 PRESENCE OF OTHER VASCULAR IMPLANTS AND GRAFTS: Chronic | ICD-10-CM

## 2024-01-10 DIAGNOSIS — Z98.890 OTHER SPECIFIED POSTPROCEDURAL STATES: Chronic | ICD-10-CM

## 2024-01-10 DIAGNOSIS — M79.89 OTHER SPECIFIED SOFT TISSUE DISORDERS: ICD-10-CM

## 2024-01-10 PROCEDURE — 29580 STRAPPING UNNA BOOT: CPT | Mod: LT

## 2024-01-12 NOTE — PROCEDURE
[] : A well-padded Unna Boot was applied to the right lower extremity. [FreeTextEntry1] : pt with good skin integrity bilateral feet. pt with scabs to right calf 2nd to hitting bedrail dressed with duoderm,omnifix to protect site. left  wound dressed with bacitracin,adaptic to protect site. cushioned with sifoam. unna boot,ace wrap, coban and stockinette applied to decrease chronic edema. pt to return next week.

## 2024-01-12 NOTE — PHYSICAL EXAM
[Skin Lesions] : no skin lesions [FreeTextEntry1] : Superficial ulcer left lower leg and foot, limited to skin breakdown Swelling LLE

## 2024-01-17 ENCOUNTER — APPOINTMENT (OUTPATIENT)
Dept: PODIATRY | Facility: CLINIC | Age: 64
End: 2024-01-17

## 2024-01-24 ENCOUNTER — APPOINTMENT (OUTPATIENT)
Dept: PODIATRY | Facility: CLINIC | Age: 64
End: 2024-01-24
Payer: MEDICARE

## 2024-01-24 ENCOUNTER — OUTPATIENT (OUTPATIENT)
Dept: OUTPATIENT SERVICES | Facility: HOSPITAL | Age: 64
LOS: 1 days | End: 2024-01-24
Payer: MEDICARE

## 2024-01-24 DIAGNOSIS — Z95.828 PRESENCE OF OTHER VASCULAR IMPLANTS AND GRAFTS: Chronic | ICD-10-CM

## 2024-01-24 DIAGNOSIS — Z95.1 PRESENCE OF AORTOCORONARY BYPASS GRAFT: Chronic | ICD-10-CM

## 2024-01-24 DIAGNOSIS — Z00.00 ENCOUNTER FOR GENERAL ADULT MEDICAL EXAMINATION WITHOUT ABNORMAL FINDINGS: ICD-10-CM

## 2024-01-24 DIAGNOSIS — I77.0 ARTERIOVENOUS FISTULA, ACQUIRED: Chronic | ICD-10-CM

## 2024-01-24 DIAGNOSIS — Z98.890 OTHER SPECIFIED POSTPROCEDURAL STATES: Chronic | ICD-10-CM

## 2024-01-24 PROCEDURE — G2211 COMPLEX E/M VISIT ADD ON: CPT

## 2024-01-24 PROCEDURE — 99214 OFFICE O/P EST MOD 30 MIN: CPT | Mod: 25,95

## 2024-01-24 PROCEDURE — 99214 OFFICE O/P EST MOD 30 MIN: CPT

## 2024-01-26 NOTE — PROCEDURE
[] : A well-padded Unna Boot was applied to the left lower extremity. [FreeTextEntry1] : dressings removed. right tobial wound healed. leg moisturized with a and d ointment. tubigrip applied to decrease edema. left heel moist. applied betadine,4x4,omnifix. sifoam applied to heel for cushioning. unna boot,coban and tubigrip applied to right to decrease chronic edema. pt progressing. pt to return next week.
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
## 2024-01-30 DIAGNOSIS — M79.89 OTHER SPECIFIED SOFT TISSUE DISORDERS: ICD-10-CM

## 2024-01-30 DIAGNOSIS — M14.60 CHARCOT'S JOINT, UNSPECIFIED SITE: ICD-10-CM

## 2024-01-30 DIAGNOSIS — E11.42 TYPE 2 DIABETES MELLITUS WITH DIABETIC POLYNEUROPATHY: ICD-10-CM

## 2024-01-30 DIAGNOSIS — Q66.80 CONGENITAL VERTICAL TALUS DEFORMITY, UNSPECIFIED FOOT: ICD-10-CM

## 2024-01-30 DIAGNOSIS — I73.9 PERIPHERAL VASCULAR DISEASE, UNSPECIFIED: ICD-10-CM
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
## 2024-01-31 ENCOUNTER — OUTPATIENT (OUTPATIENT)
Dept: OUTPATIENT SERVICES | Facility: HOSPITAL | Age: 64
LOS: 1 days | End: 2024-01-31
Payer: MEDICARE

## 2024-01-31 ENCOUNTER — APPOINTMENT (OUTPATIENT)
Dept: PODIATRY | Facility: CLINIC | Age: 64
End: 2024-01-31
Payer: MEDICARE

## 2024-01-31 DIAGNOSIS — Z95.1 PRESENCE OF AORTOCORONARY BYPASS GRAFT: Chronic | ICD-10-CM

## 2024-01-31 DIAGNOSIS — Z00.00 ENCOUNTER FOR GENERAL ADULT MEDICAL EXAMINATION WITHOUT ABNORMAL FINDINGS: ICD-10-CM

## 2024-01-31 DIAGNOSIS — I77.0 ARTERIOVENOUS FISTULA, ACQUIRED: Chronic | ICD-10-CM

## 2024-01-31 DIAGNOSIS — Z95.828 PRESENCE OF OTHER VASCULAR IMPLANTS AND GRAFTS: Chronic | ICD-10-CM

## 2024-01-31 DIAGNOSIS — Z98.890 OTHER SPECIFIED POSTPROCEDURAL STATES: Chronic | ICD-10-CM

## 2024-01-31 PROCEDURE — 99214 OFFICE O/P EST MOD 30 MIN: CPT

## 2024-01-31 PROCEDURE — 99214 OFFICE O/P EST MOD 30 MIN: CPT | Mod: 95

## 2024-02-02 NOTE — PROCEDURE
[] : A well-padded Unna Boot was applied to the left lower extremity. [FreeTextEntry1] : dressings removed. good skin integrity bilateral  with a  and d ointment. edema decreasing to left l.e. continue with padding to left heel,unna boot applied to decrease edema further. pt doing well. will return in 2 weeks.

## 2024-02-02 NOTE — END OF VISIT
[] : Resident [Resident] : Resident [Time Spent: ___ minutes] : I have spent [unfilled] minutes of time on the encounter.

## 2024-02-02 NOTE — PHYSICAL EXAM
[Skin Lesions] : no skin lesions [de-identified] : Rocker bottom deformity L foot  [FreeTextEntry1] : Superficial ulcer left lower leg and foot, limited to skin breakdown Swelling LLE

## 2024-02-02 NOTE — HISTORY OF PRESENT ILLNESS
[Wheelchair] : a wheelchair [FreeTextEntry1] : - healed L Heel ulcer - hx of 5th toe amp L foot - LE alejandros

## 2024-02-05 DIAGNOSIS — M79.89 OTHER SPECIFIED SOFT TISSUE DISORDERS: ICD-10-CM

## 2024-02-05 DIAGNOSIS — L97.821 NON-PRESSURE CHRONIC ULCER OF OTHER PART OF LEFT LOWER LEG LIMITED TO BREAKDOWN OF SKIN: ICD-10-CM

## 2024-02-05 DIAGNOSIS — I73.9 PERIPHERAL VASCULAR DISEASE, UNSPECIFIED: ICD-10-CM

## 2024-02-05 DIAGNOSIS — E11.42 TYPE 2 DIABETES MELLITUS WITH DIABETIC POLYNEUROPATHY: ICD-10-CM

## 2024-02-05 DIAGNOSIS — M14.60 CHARCOT'S JOINT, UNSPECIFIED SITE: ICD-10-CM

## 2024-02-21 ENCOUNTER — APPOINTMENT (OUTPATIENT)
Dept: PODIATRY | Facility: CLINIC | Age: 64
End: 2024-02-21
Payer: MEDICARE

## 2024-02-21 ENCOUNTER — OUTPATIENT (OUTPATIENT)
Dept: OUTPATIENT SERVICES | Facility: HOSPITAL | Age: 64
LOS: 1 days | End: 2024-02-21
Payer: MEDICARE

## 2024-02-21 DIAGNOSIS — Z98.890 OTHER SPECIFIED POSTPROCEDURAL STATES: Chronic | ICD-10-CM

## 2024-02-21 DIAGNOSIS — Z95.1 PRESENCE OF AORTOCORONARY BYPASS GRAFT: Chronic | ICD-10-CM

## 2024-02-21 DIAGNOSIS — Z95.828 PRESENCE OF OTHER VASCULAR IMPLANTS AND GRAFTS: Chronic | ICD-10-CM

## 2024-02-21 DIAGNOSIS — Z00.00 ENCOUNTER FOR GENERAL ADULT MEDICAL EXAMINATION WITHOUT ABNORMAL FINDINGS: ICD-10-CM

## 2024-02-21 DIAGNOSIS — I77.0 ARTERIOVENOUS FISTULA, ACQUIRED: Chronic | ICD-10-CM

## 2024-02-21 PROCEDURE — 99215 OFFICE O/P EST HI 40 MIN: CPT | Mod: 95

## 2024-02-21 PROCEDURE — 99215 OFFICE O/P EST HI 40 MIN: CPT

## 2024-02-21 NOTE — DIETITIAN INITIAL EVALUATION ADULT. - OBTAIN WEEKLY WEIGHT
Population Health Chart Review & Patient Outreach Details    Outreach Performed: NO    Additional Pop Health Notes:           Updates Requested / Reviewed:      Updated Care Coordination Note and Care Everywhere         Health Maintenance Topics Overdue:    Health Maintenance Due   Topic Date Due    Mammogram  Never done    Shingles Vaccine (1 of 2) Never done    Influenza Vaccine (1) Never done    COVID-19 Vaccine (3 - 2023-24 season) 09/01/2023         Health Maintenance Topic(s) Outreach Outcomes & Actions Taken:    Breast Cancer Screening - Outreach Outcomes & Actions Taken  : External Records Requested & Care Team Updated if Applicable         yes

## 2024-02-26 NOTE — CHART NOTE - NSCHARTNOTEFT_GEN_A_CORE
Surgery for today cancelled, rescheduled to Tues 11/29 due to need for COVID workup and continued OR delay at this time.     Podiatry   [phone] No Surgery for today cancelled, rescheduled to Tues 11/29 due to need for COVID workup and continued OR delay at this time. NPO orders discontinued, please allow patient to eat.     Podiatry   [phone]

## 2024-02-28 ENCOUNTER — APPOINTMENT (OUTPATIENT)
Dept: VASCULAR SURGERY | Facility: CLINIC | Age: 64
End: 2024-02-28
Payer: MEDICARE

## 2024-02-28 ENCOUNTER — APPOINTMENT (OUTPATIENT)
Dept: PODIATRY | Facility: CLINIC | Age: 64
End: 2024-02-28

## 2024-02-28 VITALS
HEIGHT: 71 IN | HEART RATE: 92 BPM | BODY MASS INDEX: 30.94 KG/M2 | DIASTOLIC BLOOD PRESSURE: 74 MMHG | WEIGHT: 221 LBS | SYSTOLIC BLOOD PRESSURE: 141 MMHG

## 2024-02-28 DIAGNOSIS — I65.23 OCCLUSION AND STENOSIS OF BILATERAL CAROTID ARTERIES: ICD-10-CM

## 2024-02-28 PROCEDURE — 29580 STRAPPING UNNA BOOT: CPT | Mod: LT

## 2024-02-28 PROCEDURE — 93880 EXTRACRANIAL BILAT STUDY: CPT

## 2024-02-28 PROCEDURE — 93925 LOWER EXTREMITY STUDY: CPT

## 2024-02-28 NOTE — ASSESSMENT
[FreeTextEntry1] : The patient is a 63-year-old male with a history of a left popliteal to anterior tibial artery vein bypass graft for a left foot wound.  The patient still has a heel wound present which is slowly healing.  He has been receiving Unna boot therapy from his podiatrist and local wound care.  Today I performed an arterial duplex that shows a patent bypass graft however it was a severely limited study secondary to limb edema edema.  Patient also has a history of a right carotid endarterectomy which is widely patent and left internal carotid artery is less than 50% stenosis.  I would like the patient to continue with his current wound care.  I reapplied an Unna boot to his left lower extremity to treat his swelling and I will see him back in my office in 2 months time for a wound check.   I, Dr. Malik, personally performed the evaluation and management (E/M) services for this established patient who presents today with (a) new problem(s)/exacerbation of (an) existing condition(s). That E/M includes conducting the clinically appropriate interval history &/or exam, assessing all new/exacerbated conditions, and establishing a new plan of care. Today, my CLEOPATRA, Melody Kush was here to observe my evaluation and management service for this new problem/exacerbated condition and follow the plan of care established by me going forward.  Thank you for allowing me to participate in the care of your patient.   Sincerely,   John Malik MD, RPVI, FACS Associate Professor of Surgery , Vascular Fellowship Director of Limb Salvage Surgery [facility] [employer] [facility] at [facility]

## 2024-02-28 NOTE — HISTORY OF PRESENT ILLNESS
[Wheelchair] : a wheelchair [FreeTextEntry1] : - reoccurred  L Heel ulcer - hx of 5th toe amp L foot - LE weanclaires

## 2024-02-28 NOTE — END OF VISIT
[] : Resident [FreeTextEntry3] : Patient evaluated and treated Unna boto and offloading applied  [Resident] : Resident [Time Spent: ___ minutes] : I have spent [unfilled] minutes of time on the encounter.

## 2024-02-28 NOTE — PROCEDURE
[FreeTextEntry1] : dressings removed. pt gas developed a new wound to left lateral heel. superficial, 2 cm dm. dressed with cyndi,2x2s,omnifix. cushioned with sifoam. unna boot applied to prevent chronic edema. pt advised to use a pillow under calf to off load site. pt to return next week.

## 2024-02-28 NOTE — DATA REVIEWED
[FreeTextEntry1] : Carotid duplex right carotid endarterectomy site is widely patent left internal carotid artery less than 50% stenosis.  Arterial duplex  The right lower extremity arterial duplex history of AT angioplasty there is no evidence of hemodynamically significant disease in the lower extremity from the groin to the knee.   The common origin of the deep femoral superficial femoral and popliteal arteries are patent.  Diffuse atherosclerotic plaque visualized in the tibial arteries with evidence of severely diminished flow in the posterior tibial artery.  The anterior tibial and dorsalis pedis artery are patent with no evidence of restenosis at this time.  Left history of popliteal anterior tibial artery vein bypass Limited study secondary to severe limb edema the popliteal anterior tibial artery vein bypass graft is patent.  Both anastomosis are visualized.  The inflow and outflow vessels are visualized and patent

## 2024-02-28 NOTE — PHYSICAL EXAM
[de-identified] : Rocker bottom deformity L foot  [Skin Lesions] : no skin lesions [FreeTextEntry1] : Superficial ulcer left genoveva;, limited to skin breakdown Swelling LLE

## 2024-02-28 NOTE — PHYSICAL EXAM
[2+] : right 2+ [0] : left 0 [Ankle Swelling (On Exam)] : present [Ankle Swelling On The Left] : moderate [Ankle Swelling Bilaterally] : bilaterally  [FreeTextEntry1] : Left heel 2 cm superficial ulcer present healing

## 2024-02-29 DIAGNOSIS — E11.42 TYPE 2 DIABETES MELLITUS WITH DIABETIC POLYNEUROPATHY: ICD-10-CM

## 2024-02-29 DIAGNOSIS — I73.9 PERIPHERAL VASCULAR DISEASE, UNSPECIFIED: ICD-10-CM

## 2024-02-29 DIAGNOSIS — I70.209 UNSPECIFIED ATHEROSCLEROSIS OF NATIVE ARTERIES OF EXTREMITIES, UNSPECIFIED EXTREMITY: ICD-10-CM

## 2024-02-29 DIAGNOSIS — L97.422 NON-PRESSURE CHRONIC ULCER OF LEFT HEEL AND MIDFOOT WITH FAT LAYER EXPOSED: ICD-10-CM
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
## 2024-02-29 DIAGNOSIS — L97.821 NON-PRESSURE CHRONIC ULCER OF OTHER PART OF LEFT LOWER LEG LIMITED TO BREAKDOWN OF SKIN: ICD-10-CM

## 2024-02-29 DIAGNOSIS — E11.621 TYPE 2 DIABETES MELLITUS WITH FOOT ULCER: ICD-10-CM

## 2024-03-06 ENCOUNTER — APPOINTMENT (OUTPATIENT)
Dept: PODIATRY | Facility: CLINIC | Age: 64
End: 2024-03-06

## 2024-03-07 NOTE — PATIENT PROFILE ADULT - HISTORY OF COVID-19 VACCINATION
Clarita Gracia  : 1985  Primary: [employer]  Secondary:  [facility]  [address]  [address]  [facility] [address]  Phone: [phone]  Fax: [phone]    PT Visit Info:    Total # of Visits to Date: 5  Canceled Appointment: 1      OUTPATIENT THERAPY: 3/7/2024  Episode  Appt Desk        Clarita Gracia cancelled her appointment for today due to  family emergency .  Will plan to follow up next during next appointment.  Thank you,  Gita Brady PT    Future Appointments   Date Time Provider Department Center   3/7/2024  9:00 AM Gita Brady PT [facility]   3/14/2024  8:00 AM Gita Brady PT [facility]   3/21/2024  8:00 AM Gita Brady PT [facility]   3/28/2024  8:00 AM Gita Brady PT [facility]   2024  8:40 AM Shane Cain MD [facility]        
Yes

## 2024-03-13 ENCOUNTER — APPOINTMENT (OUTPATIENT)
Dept: PODIATRY | Facility: CLINIC | Age: 64
End: 2024-03-13
Payer: MEDICARE

## 2024-03-13 ENCOUNTER — OUTPATIENT (OUTPATIENT)
Dept: OUTPATIENT SERVICES | Facility: HOSPITAL | Age: 64
LOS: 1 days | End: 2024-03-13
Payer: MEDICARE

## 2024-03-13 ENCOUNTER — APPOINTMENT (OUTPATIENT)
Dept: PODIATRY | Facility: CLINIC | Age: 64
End: 2024-03-13

## 2024-03-13 DIAGNOSIS — Z00.00 ENCOUNTER FOR GENERAL ADULT MEDICAL EXAMINATION WITHOUT ABNORMAL FINDINGS: ICD-10-CM

## 2024-03-13 DIAGNOSIS — Z95.1 PRESENCE OF AORTOCORONARY BYPASS GRAFT: Chronic | ICD-10-CM

## 2024-03-13 DIAGNOSIS — L60.2 ONYCHOGRYPHOSIS: ICD-10-CM

## 2024-03-13 DIAGNOSIS — L97.509 TYPE 2 DIABETES MELLITUS WITH FOOT ULCER: ICD-10-CM

## 2024-03-13 DIAGNOSIS — Z95.828 PRESENCE OF OTHER VASCULAR IMPLANTS AND GRAFTS: Chronic | ICD-10-CM

## 2024-03-13 DIAGNOSIS — E11.621 TYPE 2 DIABETES MELLITUS WITH FOOT ULCER: ICD-10-CM

## 2024-03-13 DIAGNOSIS — I77.0 ARTERIOVENOUS FISTULA, ACQUIRED: Chronic | ICD-10-CM

## 2024-03-13 DIAGNOSIS — I70.235 ATHEROSCLEROSIS OF NATIVE ARTERIES OF RIGHT LEG WITH ULCERATION OF OTHER PART OF FOOT: ICD-10-CM

## 2024-03-13 PROCEDURE — 29580 STRAPPING UNNA BOOT: CPT | Mod: 59,LT

## 2024-03-13 PROCEDURE — 29580 STRAPPING UNNA BOOT: CPT | Mod: LT,59

## 2024-03-13 PROCEDURE — 11042 DBRDMT SUBQ TIS 1ST 20SQCM/<: CPT | Mod: LT

## 2024-03-13 PROCEDURE — 11042 DBRDMT SUBQ TIS 1ST 20SQCM/<: CPT

## 2024-03-13 PROCEDURE — 11721 DEBRIDE NAIL 6 OR MORE: CPT | Mod: 59

## 2024-03-13 PROCEDURE — 11721 DEBRIDE NAIL 6 OR MORE: CPT

## 2024-03-13 NOTE — HISTORY OF PRESENT ILLNESS
[Wheelchair] : a wheelchair [FreeTextEntry1] : - L Heel ulcer healing  - hx of 5th toe amp L foot - LE weakness  -B/L LE swelling

## 2024-03-13 NOTE — PROCEDURE
Detail Level: Detailed [] : A well-padded Unna Boot was applied to the left lower extremity. [FreeTextEntry1] : dressings removed. dressed with duoderm.. unna boot applied to prevent chronic edema. pt advised to use a pillow under calf to off load site. pt to return next week.

## 2024-03-13 NOTE — PHYSICAL EXAM
[de-identified] : Rocker bottom deformity L foot  L 5th toe amp  [Skin Lesions] : no skin lesions [Foot Ulcer] : foot ulcer [FreeTextEntry1] :  ulcer left genoveva;, Size 0.5c0.5  Swelling LLE  Elongated nails x10

## 2024-03-13 NOTE — END OF VISIT
[] : Resident [Resident] : Resident [FreeTextEntry3] : Patient evaluated and treated Unna boto and offloading applied  [Time Spent: ___ minutes] : I have spent [unfilled] minutes of time on the encounter.
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
## 2024-03-19 DIAGNOSIS — M14.60 CHARCOT'S JOINT, UNSPECIFIED SITE: ICD-10-CM

## 2024-03-19 DIAGNOSIS — L97.422 NON-PRESSURE CHRONIC ULCER OF LEFT HEEL AND MIDFOOT WITH FAT LAYER EXPOSED: ICD-10-CM

## 2024-03-19 DIAGNOSIS — E11.42 TYPE 2 DIABETES MELLITUS WITH DIABETIC POLYNEUROPATHY: ICD-10-CM
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
## 2024-03-19 DIAGNOSIS — M79.89 OTHER SPECIFIED SOFT TISSUE DISORDERS: ICD-10-CM

## 2024-03-19 DIAGNOSIS — L97.821 NON-PRESSURE CHRONIC ULCER OF OTHER PART OF LEFT LOWER LEG LIMITED TO BREAKDOWN OF SKIN: ICD-10-CM

## 2024-03-19 DIAGNOSIS — I70.235 ATHEROSCLEROSIS OF NATIVE ARTERIES OF RIGHT LEG WITH ULCERATION OF OTHER PART OF FOOT: ICD-10-CM

## 2024-03-19 DIAGNOSIS — L60.2 ONYCHOGRYPHOSIS: ICD-10-CM

## 2024-03-19 DIAGNOSIS — E11.621 TYPE 2 DIABETES MELLITUS WITH FOOT ULCER: ICD-10-CM

## 2024-03-20 ENCOUNTER — APPOINTMENT (OUTPATIENT)
Dept: PODIATRY | Facility: CLINIC | Age: 64
End: 2024-03-20

## 2024-03-27 ENCOUNTER — EMERGENCY (EMERGENCY)
Facility: HOSPITAL | Age: 64
LOS: 0 days | Discharge: HOME | End: 2024-03-28
Attending: EMERGENCY MEDICINE
Payer: MEDICARE

## 2024-03-27 ENCOUNTER — OUTPATIENT (OUTPATIENT)
Dept: OUTPATIENT SERVICES | Facility: HOSPITAL | Age: 64
LOS: 1 days | End: 2024-03-27
Payer: MEDICARE

## 2024-03-27 ENCOUNTER — APPOINTMENT (OUTPATIENT)
Dept: PODIATRY | Facility: CLINIC | Age: 64
End: 2024-03-27
Payer: MEDICARE

## 2024-03-27 VITALS
TEMPERATURE: 99 F | SYSTOLIC BLOOD PRESSURE: 137 MMHG | RESPIRATION RATE: 19 BRPM | HEIGHT: 67 IN | OXYGEN SATURATION: 97 % | DIASTOLIC BLOOD PRESSURE: 64 MMHG | WEIGHT: 223.11 LBS | HEART RATE: 72 BPM

## 2024-03-27 DIAGNOSIS — E11.622 TYPE 2 DIABETES MELLITUS WITH OTHER SKIN ULCER: ICD-10-CM

## 2024-03-27 DIAGNOSIS — Z95.828 PRESENCE OF OTHER VASCULAR IMPLANTS AND GRAFTS: Chronic | ICD-10-CM

## 2024-03-27 DIAGNOSIS — Z98.890 OTHER SPECIFIED POSTPROCEDURAL STATES: Chronic | ICD-10-CM

## 2024-03-27 DIAGNOSIS — I77.0 ARTERIOVENOUS FISTULA, ACQUIRED: Chronic | ICD-10-CM

## 2024-03-27 DIAGNOSIS — L97.811 TYPE 2 DIABETES MELLITUS WITH OTHER SKIN ULCER: ICD-10-CM

## 2024-03-27 DIAGNOSIS — Z00.00 ENCOUNTER FOR GENERAL ADULT MEDICAL EXAMINATION WITHOUT ABNORMAL FINDINGS: ICD-10-CM

## 2024-03-27 DIAGNOSIS — Z95.1 PRESENCE OF AORTOCORONARY BYPASS GRAFT: Chronic | ICD-10-CM

## 2024-03-27 PROCEDURE — 99215 OFFICE O/P EST HI 40 MIN: CPT

## 2024-03-27 PROCEDURE — 70450 CT HEAD/BRAIN W/O DYE: CPT | Mod: MC

## 2024-03-27 PROCEDURE — 74177 CT ABD & PELVIS W/CONTRAST: CPT | Mod: MC

## 2024-03-27 PROCEDURE — 73502 X-RAY EXAM HIP UNI 2-3 VIEWS: CPT | Mod: RT

## 2024-03-27 PROCEDURE — 71045 X-RAY EXAM CHEST 1 VIEW: CPT

## 2024-03-27 PROCEDURE — 72125 CT NECK SPINE W/O DYE: CPT | Mod: MC

## 2024-03-27 PROCEDURE — 80053 COMPREHEN METABOLIC PANEL: CPT

## 2024-03-27 PROCEDURE — 99285 EMERGENCY DEPT VISIT HI MDM: CPT

## 2024-03-27 PROCEDURE — 36415 COLL VENOUS BLD VENIPUNCTURE: CPT

## 2024-03-27 PROCEDURE — 85025 COMPLETE CBC W/AUTO DIFF WBC: CPT

## 2024-03-27 PROCEDURE — 99284 EMERGENCY DEPT VISIT MOD MDM: CPT | Mod: 25

## 2024-03-27 NOTE — ED ADULT NURSE NOTE - NSFALLHARMRISKINTERV_ED_ALL_ED

## 2024-03-27 NOTE — ED ADULT TRIAGE NOTE - CHIEF COMPLAINT QUOTE
BIBA with complaints of S/P fall while getting up from his wheelchair to bed, fell backward and hit his head on the fan with right hip pain. Denies LOC, on ASA/Plavix

## 2024-03-27 NOTE — ED ADULT NURSE NOTE - OBJECTIVE STATEMENT
BIBA from a rehab center c/o fall while getting up from his wheelchair to bed; per pt he lost his balance and fell backward; hitting head on the fan. Pt reported right hip pain after falling but denies any pain at this time. no leg shortening/inward/outward rotation noted. Denies LOC, on ASA/Plavix. Pt denies any other injuries/deformities sustained.

## 2024-03-28 VITALS
TEMPERATURE: 98 F | SYSTOLIC BLOOD PRESSURE: 136 MMHG | OXYGEN SATURATION: 96 % | HEART RATE: 78 BPM | DIASTOLIC BLOOD PRESSURE: 63 MMHG | RESPIRATION RATE: 17 BRPM

## 2024-03-28 LAB
ALBUMIN SERPL ELPH-MCNC: 2.9 G/DL — LOW (ref 3.5–5.2)
ALP SERPL-CCNC: 224 U/L — HIGH (ref 30–115)
ALT FLD-CCNC: 14 U/L — SIGNIFICANT CHANGE UP (ref 0–41)
ANION GAP SERPL CALC-SCNC: 9 MMOL/L — SIGNIFICANT CHANGE UP (ref 7–14)
AST SERPL-CCNC: 20 U/L — SIGNIFICANT CHANGE UP (ref 0–41)
BASOPHILS # BLD AUTO: 0.06 K/UL — SIGNIFICANT CHANGE UP (ref 0–0.2)
BASOPHILS NFR BLD AUTO: 1.2 % — HIGH (ref 0–1)
BILIRUB SERPL-MCNC: 0.3 MG/DL — SIGNIFICANT CHANGE UP (ref 0.2–1.2)
BUN SERPL-MCNC: 34 MG/DL — HIGH (ref 10–20)
CALCIUM SERPL-MCNC: 7.9 MG/DL — LOW (ref 8.4–10.5)
CHLORIDE SERPL-SCNC: 97 MMOL/L — LOW (ref 98–110)
CO2 SERPL-SCNC: 30 MMOL/L — SIGNIFICANT CHANGE UP (ref 17–32)
CREAT SERPL-MCNC: 4.3 MG/DL — CRITICAL HIGH (ref 0.7–1.5)
EGFR: 14 ML/MIN/1.73M2 — LOW
EOSINOPHIL # BLD AUTO: 0.24 K/UL — SIGNIFICANT CHANGE UP (ref 0–0.7)
EOSINOPHIL NFR BLD AUTO: 4.9 % — SIGNIFICANT CHANGE UP (ref 0–8)
GLUCOSE SERPL-MCNC: 131 MG/DL — HIGH (ref 70–99)
HCT VFR BLD CALC: 23.4 % — LOW (ref 42–52)
HGB BLD-MCNC: 7.5 G/DL — LOW (ref 14–18)
IMM GRANULOCYTES NFR BLD AUTO: 0.2 % — SIGNIFICANT CHANGE UP (ref 0.1–0.3)
LYMPHOCYTES # BLD AUTO: 0.69 K/UL — LOW (ref 1.2–3.4)
LYMPHOCYTES # BLD AUTO: 14.1 % — LOW (ref 20.5–51.1)
MCHC RBC-ENTMCNC: 26.9 PG — LOW (ref 27–31)
MCHC RBC-ENTMCNC: 32.1 G/DL — SIGNIFICANT CHANGE UP (ref 32–37)
MCV RBC AUTO: 83.9 FL — SIGNIFICANT CHANGE UP (ref 80–94)
MONOCYTES # BLD AUTO: 0.51 K/UL — SIGNIFICANT CHANGE UP (ref 0.1–0.6)
MONOCYTES NFR BLD AUTO: 10.4 % — HIGH (ref 1.7–9.3)
NEUTROPHILS # BLD AUTO: 3.4 K/UL — SIGNIFICANT CHANGE UP (ref 1.4–6.5)
NEUTROPHILS NFR BLD AUTO: 69.2 % — SIGNIFICANT CHANGE UP (ref 42.2–75.2)
NRBC # BLD: 0 /100 WBCS — SIGNIFICANT CHANGE UP (ref 0–0)
PLATELET # BLD AUTO: 164 K/UL — SIGNIFICANT CHANGE UP (ref 130–400)
PMV BLD: 10.7 FL — HIGH (ref 7.4–10.4)
POTASSIUM SERPL-MCNC: 3.9 MMOL/L — SIGNIFICANT CHANGE UP (ref 3.5–5)
POTASSIUM SERPL-SCNC: 3.9 MMOL/L — SIGNIFICANT CHANGE UP (ref 3.5–5)
PROT SERPL-MCNC: 6.7 G/DL — SIGNIFICANT CHANGE UP (ref 6–8)
RBC # BLD: 2.79 M/UL — LOW (ref 4.7–6.1)
RBC # FLD: 23.1 % — HIGH (ref 11.5–14.5)
SODIUM SERPL-SCNC: 136 MMOL/L — SIGNIFICANT CHANGE UP (ref 135–146)
WBC # BLD: 4.91 K/UL — SIGNIFICANT CHANGE UP (ref 4.8–10.8)
WBC # FLD AUTO: 4.91 K/UL — SIGNIFICANT CHANGE UP (ref 4.8–10.8)

## 2024-03-28 PROCEDURE — 70450 CT HEAD/BRAIN W/O DYE: CPT | Mod: 26,MC

## 2024-03-28 PROCEDURE — 71045 X-RAY EXAM CHEST 1 VIEW: CPT | Mod: 26

## 2024-03-28 PROCEDURE — 74177 CT ABD & PELVIS W/CONTRAST: CPT | Mod: 26,MC

## 2024-03-28 PROCEDURE — 72125 CT NECK SPINE W/O DYE: CPT | Mod: 26,MC

## 2024-03-28 PROCEDURE — 73502 X-RAY EXAM HIP UNI 2-3 VIEWS: CPT | Mod: 26,RT

## 2024-03-28 NOTE — ED PROVIDER NOTE - OBJECTIVE STATEMENT
64-year-old male with PMH ESRD on hemodialysis, NIDDM, BPH, CAD, CABG, HTN, CHF, TIA, PAD presents for evaluation status post fall.  Patient states he was transferring out of his wheelchair and fell forward hitting his head.  Reporting some mild right hip pain.  Patient denies any dizziness, weakness, chest pain, shortness of breath, abdominal pain or back pain.  Denies anticoagulant use.  Patient living at [facility] rehab currently.`

## 2024-03-28 NOTE — ED PROVIDER NOTE - NSFOLLOWUPINSTRUCTIONS_ED_ALL_ED_FT
Please follow up with your primary care physician.     Fall Prevention in the Home, Adult  Falls can cause injuries and can happen to people of all ages. There are many things you can do to make your home safer and to help prevent falls.    What actions can I take to prevent falls?  General information    Use good lighting in all rooms. Make sure to:  Replace any light bulbs that burn out.  Turn on the lights in dark areas and use night-lights.  Keep items that you use often in easy-to-reach places. Lower the shelves around your home if needed.  Move furniture so that there are clear paths around it.  Do not use throw rugs or other things on the floor that can make you trip.  If any of your floors are uneven, fix them.  Add color or contrast paint or tape to clearly mariela and help you see:  Grab bars or handrails.  First and last steps of staircases.  Where the edge of each step is.  If you use a ladder or stepladder:  Make sure that it is fully opened. Do not climb a closed ladder.  Make sure the sides of the ladder are locked in place.  Have someone hold the ladder while you use it.  Know where your pets are as you move through your home.  What can I do in the bathroom?    A grab bar next to a toilet.  Shower and bathtub, showing safety grab bars on the walls.  Keep the floor dry. Clean up any water on the floor right away.  Remove soap buildup in the bathtub or shower. Buildup makes bathtubs and showers slippery.  Use non-skid mats or decals on the floor of the bathtub or shower.  Attach bath mats securely with double-sided, non-slip rug tape.  If you need to sit down in the shower, use a non-slip stool.  Install grab bars by the toilet and in the bathtub and shower. Do not use towel bars as grab bars.  What can I do in the bedroom?    Make sure that you have a light by your bed that is easy to reach.  Do not use any sheets or blankets on your bed that hang to the floor.  Have a firm chair or bench with side arms that you can use for support when you get dressed.  What can I do in the kitchen?    Clean up any spills right away.  If you need to reach something above you, use a step stool with a grab bar.  Keep electrical cords out of the way.  Do not use floor polish or wax that makes floors slippery.  What can I do with my stairs?    Do not leave anything on the stairs.  Make sure that you have a light switch at the top and the bottom of the stairs.  Make sure that there are handrails on both sides of the stairs. Fix handrails that are broken or loose.  Install non-slip stair treads on all your stairs if they do not have carpet.  Avoid having throw rugs at the top or bottom of the stairs.  Choose a carpet that does not hide the edge of the steps on the stairs. Make sure that the carpet is firmly attached to the stairs. Fix carpet that is loose or worn.  What can I do on the outside of my home?    Use bright outdoor lighting.  Fix the edges of walkways and driveways and fix any cracks. Clear paths of anything that can make you trip, such as tools or rocks.  Add color or contrast paint or tape to clearly mariela and help you see anything that might make you trip as you walk through a door, such as a raised step or threshold.  Trim any bushes or trees on paths to your home.  Check to see if handrails are loose or broken and that both sides of all steps have handrails. Install guardrails along the edges of any raised decks and porches.  Have leaves, snow, or ice cleared regularly. Use sand, salt, or ice melter on paths if you live where there is ice and snow during the winter.  Clean up any spills in your garage right away. This includes grease or oil spills.  What other actions can I take?    Review your medicines with your doctor. Some medicines can cause dizziness or changes in blood pressure, which increase your risk of falling.  Wear shoes that:  Have a low heel. Do not wear high heels.  Have rubber bottoms and are closed at the toe.  Feel good on your feet and fit well.  Use tools that help you move around if needed. These include:  Canes.  Walkers.  Scooters.  Crutches.  Ask your doctor what else you can do to help prevent falls. This may include seeing a physical therapist to learn to do exercises to move better and get stronger.  Where to find more information  Centers for Disease Control and Prevention, CECILIO: cdc.gov  National [place] on Aging: petrona.nih.gov  National [place] on Aging: petrona.nih.gov  Contact a doctor if:  You are afraid of falling at home.  You feel weak, drowsy, or dizzy at home.  You fall at home.  Get help right away if you:  Lose consciousness or have trouble moving after a fall.  Have a fall that causes a head injury.  These symptoms may be an emergency. Get help right away. Call 911.  Do not wait to see if the symptoms will go away.  Do not drive yourself to the hospital.  This information is not intended to replace advice given to you by your health care provider. Make sure you discuss any questions you have with your health care provider.

## 2024-03-28 NOTE — ED PROVIDER NOTE - PHYSICAL EXAMINATION
VITAL SIGNS: noted  CONSTITUTIONAL: Chronically ill-appearing,  well-nourished; in no acute distress  HEAD: Normocephalic; atraumatic  EYES: PERRL, EOM intact; conjunctiva and sclera clear  ENT: No nasal discharge; airway clear. MMM  NECK: Supple; non tender.    CARD: S1, S2 normal; no murmurs, gallops, or rubs. Regular rate and rhythm  RESP: CTAB/L, no wheezes, rales or rhonchi  ABD: Normal bowel sounds; soft; non-distended; non-tender; no CVA tenderness  EXT: Normal ROM. No hip or calf tenderness or edema. Distal pulses intact  NEURO: Alert, oriented. Grossly unremarkable. No focal deficits  SKIN: Skin exam is warm and dry, no acute rash  MS: No midline spinal tenderness

## 2024-03-28 NOTE — ED PROVIDER NOTE - NS ED ATTENDING STATEMENT MOD
Attending Only

## 2024-03-28 NOTE — ED PROVIDER NOTE - PATIENT PORTAL LINK FT
You can access the FollowMyHealth Patient Portal offered by [facility] by registering at the following website: [email or web address]. By joining [employer]’s FollowMyHealth portal, you will also be able to view your health information using other applications (apps) compatible with our system.

## 2024-03-28 NOTE — ED PROVIDER NOTE - NS ED ROS FT
Constitutional: see HPI  Eyes:  No visual changes, eye pain or discharge.  ENMT:  No hearing changes, pain, discharge or infections. No neck pain or stiffness.  Cardiac:  No chest pain, SOB or edema. No chest pain with exertion.  Respiratory:  No cough or respiratory distress. No hemoptysis.    GI:  No nausea, vomiting, diarrhea or abdominal pain.  :  No dysuria, frequency or burning.  MS:  No myalgia, muscle weakness, joint pain or back pain.  Neuro:  No headache or weakness.  No LOC.  Skin:  No skin rash.   Endocrine: No history of thyroid disease, + hx diabetes.

## 2024-03-28 NOTE — ED PROVIDER NOTE - CLINICAL SUMMARY MEDICAL DECISION MAKING FREE TEXT BOX
I received signout from Dr. Garcia.  Patient presents after mechanical fall.  Wheelchair-bound.  Patient recently complaining of hip pain and head trauma.  Also had 1 episode of vomiting in the ED.  Labs and imaging done.  Hemoglobin 7.5 at baseline.  No active bleeding.  Patient reports that hip pain has resolved.  Nontender on exam.  Concern for L1 vertebral body fracture.  No point tenderness on exam.  Patient does report some sacral tenderness he has prior to fall which has not worsened.  No numbness tingling or weakness.  All results discussed.  Made aware of incidental finding on imaging.  Copy printed.  Patient discharged with PMD follow-up and return precautions.

## 2024-03-29 DIAGNOSIS — N40.0 BENIGN PROSTATIC HYPERPLASIA WITHOUT LOWER URINARY TRACT SYMPTOMS: ICD-10-CM

## 2024-03-29 DIAGNOSIS — Z95.1 PRESENCE OF AORTOCORONARY BYPASS GRAFT: ICD-10-CM

## 2024-03-29 DIAGNOSIS — I13.2 HYPERTENSIVE HEART AND CHRONIC KIDNEY DISEASE WITH HEART FAILURE AND WITH STAGE 5 CHRONIC KIDNEY DISEASE, OR END STAGE RENAL DISEASE: ICD-10-CM

## 2024-03-29 DIAGNOSIS — I25.10 ATHEROSCLEROTIC HEART DISEASE OF NATIVE CORONARY ARTERY WITHOUT ANGINA PECTORIS: ICD-10-CM
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
## 2024-03-29 DIAGNOSIS — M53.3 SACROCOCCYGEAL DISORDERS, NOT ELSEWHERE CLASSIFIED: ICD-10-CM
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
## 2024-03-29 DIAGNOSIS — E11.22 TYPE 2 DIABETES MELLITUS WITH DIABETIC CHRONIC KIDNEY DISEASE: ICD-10-CM

## 2024-03-29 DIAGNOSIS — N18.6 END STAGE RENAL DISEASE: ICD-10-CM

## 2024-03-29 DIAGNOSIS — Z99.2 DEPENDENCE ON RENAL DIALYSIS: ICD-10-CM

## 2024-03-29 DIAGNOSIS — S09.90XA UNSPECIFIED INJURY OF HEAD, INITIAL ENCOUNTER: ICD-10-CM

## 2024-03-29 DIAGNOSIS — M25.551 PAIN IN RIGHT HIP: ICD-10-CM

## 2024-03-29 DIAGNOSIS — R11.10 VOMITING, UNSPECIFIED: ICD-10-CM

## 2024-03-29 DIAGNOSIS — Z86.73 PERSONAL HISTORY OF TRANSIENT ISCHEMIC ATTACK (TIA), AND CEREBRAL INFARCTION WITHOUT RESIDUAL DEFICITS: ICD-10-CM

## 2024-03-29 DIAGNOSIS — I50.9 HEART FAILURE, UNSPECIFIED: ICD-10-CM

## 2024-03-29 DIAGNOSIS — E11.51 TYPE 2 DIABETES MELLITUS WITH DIABETIC PERIPHERAL ANGIOPATHY WITHOUT GANGRENE: ICD-10-CM

## 2024-04-02 PROBLEM — E11.622: Status: ACTIVE | Noted: 2024-04-02

## 2024-04-02 NOTE — PROCEDURE
[] : A well-padded Unna Boot was applied to the left lower extremity. [FreeTextEntry1] : dressings removed, legs  cleaned. good skin integrity throughout. ace wraps, coban and stockinette to right to control edema. left unna boot,coban and stockinette to decrease chronis lymphedema. pt to return in 2 weeks.

## 2024-04-02 NOTE — END OF VISIT
[] : Resident [Resident] : Resident [FreeTextEntry3] : Patient evaluated and treated Unna boot, compression dressing and offloading applied  [Time Spent: ___ minutes] : I have spent [unfilled] minutes of time on the encounter.

## 2024-04-02 NOTE — ASSESSMENT
[FreeTextEntry1] : WOunds improving Unna boot applied LLE  Compression wrap RLE  RTO 2 weeks with Jose D

## 2024-04-02 NOTE — PHYSICAL EXAM
[Skin Lesions] : no skin lesions [de-identified] : Rocker bottom deformity L foot  L 5th toe amp  [Foot Ulcer] : foot ulcer [FreeTextEntry1] : B/L LE ulcerations, superficial  Swelling LLE  Elongated nails x10

## 2024-04-03 ENCOUNTER — APPOINTMENT (OUTPATIENT)
Dept: PODIATRY | Facility: CLINIC | Age: 64
End: 2024-04-03

## 2024-04-05 DIAGNOSIS — L97.421 NON-PRESSURE CHRONIC ULCER OF LEFT HEEL AND MIDFOOT LIMITED TO BREAKDOWN OF SKIN: ICD-10-CM

## 2024-04-05 DIAGNOSIS — M79.89 OTHER SPECIFIED SOFT TISSUE DISORDERS: ICD-10-CM

## 2024-04-05 DIAGNOSIS — M14.60 CHARCOT'S JOINT, UNSPECIFIED SITE: ICD-10-CM

## 2024-04-05 DIAGNOSIS — E11.622 TYPE 2 DIABETES MELLITUS WITH OTHER SKIN ULCER: ICD-10-CM

## 2024-04-05 DIAGNOSIS — L97.811 NON-PRESSURE CHRONIC ULCER OF OTHER PART OF RIGHT LOWER LEG LIMITED TO BREAKDOWN OF SKIN: ICD-10-CM
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
## 2024-04-05 DIAGNOSIS — I70.209 UNSPECIFIED ATHEROSCLEROSIS OF NATIVE ARTERIES OF EXTREMITIES, UNSPECIFIED EXTREMITY: ICD-10-CM

## 2024-04-05 DIAGNOSIS — Q66.80 CONGENITAL VERTICAL TALUS DEFORMITY, UNSPECIFIED FOOT: ICD-10-CM
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
## 2024-04-10 ENCOUNTER — APPOINTMENT (OUTPATIENT)
Dept: UROLOGY | Facility: CLINIC | Age: 64
End: 2024-04-10
Payer: MEDICARE

## 2024-04-10 ENCOUNTER — OUTPATIENT (OUTPATIENT)
Dept: OUTPATIENT SERVICES | Facility: HOSPITAL | Age: 64
LOS: 1 days | End: 2024-04-10
Payer: MEDICARE

## 2024-04-10 ENCOUNTER — APPOINTMENT (OUTPATIENT)
Dept: PODIATRY | Facility: CLINIC | Age: 64
End: 2024-04-10
Payer: MEDICARE

## 2024-04-10 VITALS
RESPIRATION RATE: 16 BRPM | TEMPERATURE: 97.6 F | DIASTOLIC BLOOD PRESSURE: 82 MMHG | HEIGHT: 71 IN | WEIGHT: 221 LBS | HEART RATE: 67 BPM | SYSTOLIC BLOOD PRESSURE: 152 MMHG | OXYGEN SATURATION: 95 % | BODY MASS INDEX: 30.94 KG/M2

## 2024-04-10 DIAGNOSIS — Z89.429 ACQUIRED ABSENCE OF OTHER TOE(S), UNSPECIFIED SIDE: ICD-10-CM

## 2024-04-10 DIAGNOSIS — R39.9 UNSPECIFIED SYMPTOMS AND SIGNS INVOLVING THE GENITOURINARY SYSTEM: ICD-10-CM

## 2024-04-10 DIAGNOSIS — I77.0 ARTERIOVENOUS FISTULA, ACQUIRED: Chronic | ICD-10-CM

## 2024-04-10 DIAGNOSIS — N40.1 BENIGN PROSTATIC HYPERPLASIA WITH LOWER URINARY TRACT SYMPMS: ICD-10-CM

## 2024-04-10 DIAGNOSIS — R97.20 ELEVATED PROSTATE, SPECIFIC ANTIGEN [PSA]: ICD-10-CM

## 2024-04-10 DIAGNOSIS — Z95.828 PRESENCE OF OTHER VASCULAR IMPLANTS AND GRAFTS: Chronic | ICD-10-CM

## 2024-04-10 DIAGNOSIS — Z00.00 ENCOUNTER FOR GENERAL ADULT MEDICAL EXAMINATION WITHOUT ABNORMAL FINDINGS: ICD-10-CM

## 2024-04-10 DIAGNOSIS — N13.8 BENIGN PROSTATIC HYPERPLASIA WITH LOWER URINARY TRACT SYMPMS: ICD-10-CM

## 2024-04-10 PROCEDURE — 99213 OFFICE O/P EST LOW 20 MIN: CPT

## 2024-04-10 PROCEDURE — G2211 COMPLEX E/M VISIT ADD ON: CPT

## 2024-04-10 PROCEDURE — 99215 OFFICE O/P EST HI 40 MIN: CPT | Mod: 95

## 2024-04-10 PROCEDURE — 99215 OFFICE O/P EST HI 40 MIN: CPT

## 2024-04-11 PROBLEM — R39.9 LOWER URINARY TRACT SYMPTOMS (LUTS): Status: ACTIVE | Noted: 2024-04-11

## 2024-04-11 PROBLEM — N40.1 BPH WITH OBSTRUCTION/LOWER URINARY TRACT SYMPTOMS: Status: ACTIVE | Noted: 2018-09-25

## 2024-04-11 PROBLEM — Z89.429 HISTORY OF AMPUTATION OF TOE: Status: ACTIVE | Noted: 2024-04-11

## 2024-04-11 PROBLEM — R97.20 ELEVATED PSA, LESS THAN 10 NG/ML: Status: ACTIVE | Noted: 2018-09-25

## 2024-04-11 NOTE — PHYSICAL EXAM
[General Appearance - Well Developed] : well developed [Normal Appearance] : normal appearance [General Appearance - In No Acute Distress] : no acute distress [Abdomen Soft] : soft [Abdomen Tenderness] : non-tender [Costovertebral Angle Tenderness] : no ~M costovertebral angle tenderness [Skin Color & Pigmentation] : normal skin color and pigmentation [] : no respiratory distress [Exaggerated Use Of Accessory Muscles For Inspiration] : no accessory muscle use [Oriented To Time, Place, And Person] : oriented to person, place, and time [Affect] : the affect was normal [Mood] : the mood was normal [Not Anxious] : not anxious [No Focal Deficits] : no focal deficits [FreeTextEntry1] : Severe bilateral lymphedema/ Left foot and high ankle cast secondary to foot injury (cut glass)

## 2024-04-11 NOTE — HISTORY OF PRESENT ILLNESS
[Urinary Frequency] : urinary frequency [Nocturia] : nocturia [Weight Loss] : no weight loss [None] : None [FreeTextEntry1] : 64 year old male with h/o elevated PSA, s/p TRUS BX on 11/26/2019.  Currently he is maintained on tamsulosin. He is ESRD on hemodialysis 4 days a week living in [facility] -  makes a very small amount of urine daily Patient has been on the  renal transplant list but that is currently on hold due to an ulcer on his foot.   All past and present data reviewed: 03/2023 mpMRI= 114 grams, BPH< PIRAD 2, no targetable lesions  ** no new PSA done for this visit  02/2023 PSA= 4.2                  //  PSAD= 0.03 07/2022 PSA= 8.0  %free 37 //   PSAD= 0.08 06/2022 PSA= 8.4 (by [facility]plant Team) 06/2021 PSA= 5.9  %free  34 // 2019 TRUS= 81 gm 2019   //  PSAD-  0.07 12/2020 PSA= 7.8  %free 36 01/2019 PSA= 7.8  10/2018 PSA= 6.9  %free 32  11/26/2019 TRUS Bx Pathology= BPH and chronic inflammation.   07/2021 mpMRI= 100 gm, no suspicious nodules  //  BPH, fibrosis and prostatitis (PIRADS 2). [Urinary Urgency] : no urinary urgency [Straining] : no straining [Weak Stream] : no weak stream [Dysuria] : no dysuria [Hematuria - Gross] : no gross hematuria [Fatigue] : no fatigue

## 2024-04-11 NOTE — PROCEDURE
[] : A well-padded Unna Boot was applied to the left lower extremity. [FreeTextEntry1] : dressings removed, legs cleaned. good skin integrity throughout. legs moisturized with a and d ointment. right dressed with kurlex,ace wraps,coban and tubigrips.left dressed with unna boot,ace wrap,coban and tubigrips. right with decreased edema. left pt removed unna boot to ankle prior to coming in today resulting in increased edema to lower extremity. pt will come back in 2 weeks, sooner if problem arises.

## 2024-04-11 NOTE — ASSESSMENT
[FreeTextEntry1] : 1. BPH  --  2019 TRUS BX #1- neg 2. Elevated PSA, nl % free, Nl PSAD 3. ESRD on HD x  4 days a week  4. Left foot ulcer -severe   Plan: -PSA now- call to discuss results- if acceptable will repeat in 6 months with an office visit

## 2024-04-11 NOTE — PHYSICAL EXAM
[Skin Lesions] : no skin lesions [de-identified] : Rocker bottom deformity L foot  L 5th toe amp  [Foot Ulcer] : foot ulcer [FreeTextEntry1] : B/L LE ulcerations, superficial  Swelling LLE

## 2024-04-19 DIAGNOSIS — Q66.80 CONGENITAL VERTICAL TALUS DEFORMITY, UNSPECIFIED FOOT: ICD-10-CM
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
## 2024-04-19 DIAGNOSIS — M14.60 CHARCOT'S JOINT, UNSPECIFIED SITE: ICD-10-CM

## 2024-04-19 DIAGNOSIS — M79.89 OTHER SPECIFIED SOFT TISSUE DISORDERS: ICD-10-CM

## 2024-04-19 DIAGNOSIS — I73.9 PERIPHERAL VASCULAR DISEASE, UNSPECIFIED: ICD-10-CM

## 2024-04-19 DIAGNOSIS — Z89.429 ACQUIRED ABSENCE OF OTHER TOE(S), UNSPECIFIED SIDE: ICD-10-CM

## 2024-04-19 DIAGNOSIS — E11.42 TYPE 2 DIABETES MELLITUS WITH DIABETIC POLYNEUROPATHY: ICD-10-CM

## 2024-04-24 ENCOUNTER — APPOINTMENT (OUTPATIENT)
Dept: PODIATRY | Facility: CLINIC | Age: 64
End: 2024-04-24
Payer: MEDICARE

## 2024-04-24 ENCOUNTER — OUTPATIENT (OUTPATIENT)
Dept: OUTPATIENT SERVICES | Facility: HOSPITAL | Age: 64
LOS: 1 days | End: 2024-04-24
Payer: MEDICARE

## 2024-04-24 DIAGNOSIS — Z98.890 OTHER SPECIFIED POSTPROCEDURAL STATES: Chronic | ICD-10-CM

## 2024-04-24 DIAGNOSIS — Z95.828 PRESENCE OF OTHER VASCULAR IMPLANTS AND GRAFTS: Chronic | ICD-10-CM

## 2024-04-24 DIAGNOSIS — Z95.1 PRESENCE OF AORTOCORONARY BYPASS GRAFT: Chronic | ICD-10-CM

## 2024-04-24 DIAGNOSIS — Z00.00 ENCOUNTER FOR GENERAL ADULT MEDICAL EXAMINATION WITHOUT ABNORMAL FINDINGS: ICD-10-CM

## 2024-04-24 DIAGNOSIS — L98.499 UNSPECIFIED ATHEROSCLEROSIS OF NATIVE ARTERIES OF EXTREMITIES, UNSPECIFIED EXTREMITY: ICD-10-CM

## 2024-04-24 DIAGNOSIS — I77.0 ARTERIOVENOUS FISTULA, ACQUIRED: Chronic | ICD-10-CM

## 2024-04-24 DIAGNOSIS — I70.209 UNSPECIFIED ATHEROSCLEROSIS OF NATIVE ARTERIES OF EXTREMITIES, UNSPECIFIED EXTREMITY: ICD-10-CM

## 2024-04-24 PROCEDURE — 29581 APPL MULTLAYER CMPRN SYS LEG: CPT | Mod: RT

## 2024-04-24 PROCEDURE — 29580 STRAPPING UNNA BOOT: CPT | Mod: 59,LT

## 2024-04-25 PROBLEM — I70.209 ATHEROSCLEROTIC PVD WITH ULCERATION: Status: ACTIVE | Noted: 2017-04-25

## 2024-04-25 NOTE — PHYSICAL EXAM
[Skin Lesions] : no skin lesions [de-identified] : Rocker bottom deformity L foot  L 5th toe amp  [FreeTextEntry1] : Superficial LE wounds

## 2024-04-25 NOTE — ASSESSMENT
[FreeTextEntry1] : Alfonso archibald applied LLE  Multilayer Compression wrap RLE  RTO 2 weeks with Jose D

## 2024-04-25 NOTE — PRE-ANESTHESIA EVALUATION ADULT - NSANTHBPHIGHRD_ENT_A_CORE
Tiara Huerta is to return to the clinic to see Dee High MD in  4 months for a 20 min OFV for follow-up     Referral:  None    Labs to be done day of next visit:  None     Labs to be done within 5 days prior to next visit:  CBC with diff  CMP  Iron  Ferritin  none      Imaging to be done within a week prior to next visit:  None    Labs to be done now:  None     Imaging to be done now:  None    Misc Orders:  None    Diagnosis    1. Iron deficiency anemia due to chronic blood loss    2. Leukocytosis, unspecified type        Dee High MD     
Yes

## 2024-04-25 NOTE — PROCEDURE
[] : A well-padded Unna Boot was applied to the left lower extremity. [FreeTextEntry1] : dressings removed, legs cleaned. good skin integrity throughout. legs moisturized with a and d ointment. hydrocortisone to control itchiness. compression dressing of kurlex,ace wrap,coban and stockinette to right. left unna boot,coban and stockinette. both applied to control chronic edema with superficial wounds. pt to return in 2 weeks. awaiting orthotist appointment.

## 2024-04-25 NOTE — HISTORY OF PRESENT ILLNESS
[Wheelchair] : a wheelchair [FreeTextEntry1] : - L Heel ulcer healing  - hx of 5th toe amp L foot - LE weakness  -B/L LE swelling with superficial ulcers

## 2024-04-26 DIAGNOSIS — M79.89 OTHER SPECIFIED SOFT TISSUE DISORDERS: ICD-10-CM

## 2024-04-26 DIAGNOSIS — M14.60 CHARCOT'S JOINT, UNSPECIFIED SITE: ICD-10-CM
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
## 2024-04-26 DIAGNOSIS — L97.811 NON-PRESSURE CHRONIC ULCER OF OTHER PART OF RIGHT LOWER LEG LIMITED TO BREAKDOWN OF SKIN: ICD-10-CM
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
## 2024-04-26 DIAGNOSIS — L97.821 NON-PRESSURE CHRONIC ULCER OF OTHER PART OF LEFT LOWER LEG LIMITED TO BREAKDOWN OF SKIN: ICD-10-CM

## 2024-04-26 DIAGNOSIS — I70.209 UNSPECIFIED ATHEROSCLEROSIS OF NATIVE ARTERIES OF EXTREMITIES, UNSPECIFIED EXTREMITY: ICD-10-CM

## 2024-04-30 NOTE — PRE-ANESTHESIA EVALUATION ADULT - HEART RATE (BEATS/MIN)
Progress Note     Red Rule Applied     Service Provided:   This visit was performed via live interactive two way video.  Clinician was at her home and patient was also at her home.  Patient was is agreement to have a 45 minutes video visit.     Relevant History and Session Note:  Patient was last seen in January 2024.  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  Concetta had a panic attack and then cried.  Assisted her to process her thoughts and feelings.  Assisted her to identify how she gives the other person her power and she internalized the anger.  Assisted her to process how her need to feel approval of others lead her to keep the anger inside her and not knowing what to do.    Progress relevant to last session: cooperative     Interventions:   Therapist took person-centered approach, engaged in empathetic listening and promoting positive self-regard as it relates to patient's presenting problem of anxiety     Assessment:  Concetta presented today as:   Behavior: cooperative  Eye Contact: intact  Speech: fluent  Attention:  attentive  Gait, movement and Motor Coordination: Within normal limits  Alert and Oriented: Yes, to Person, Place, and Time  Mood/Affect: anxious  Thought Process: linear, all or nothing  Cognitive Performance:  normal  Insight and Judgment: good  Self-Harm: denied  Current Suicidal Ideation: Suicidal ideation, plan, or intent reported? denied  Homicidal Ideation: Homicidal ideation, plan, or intent reported?  denied     Diagnosis:  Generalized anxiety state,persistent depression     Treatment Plan:   Goal #1: Decrease her anxiety  Therapeutic intervention  Educate and practice how to self regulate.  Educate and practice deep breathing, tapping, peripheral vision, meditation, progressive muscle relaxation and use of yoga.  Educated and practice also on how to calm her thoughts .  Educate and practice self talk, worry time, mindfulness and how to 
distract herself from focusing on worry.  Assisted and practice self talk and practice also a thought log.   Therapeutic Intervention  Use interpersonal therapy to teach Concetta  communication skills, conflict resolution skills, provide modeling and use role playing to build Concetta    skills.  Therapeutic Intervention   Use CBT to identify irrational self talk and help Concetta  replace distorted messages with positive, rational self talk.  Goal #2: Decrease her self criticism  Therapeutic intervention  Identify negative messages she say to herself  Identify how she learned those negative messages  Identify how the self criticism affect her  Assist her to increase self nurturing statements and affirmations     Recommendations/Plan:  I will continue to follow Concetta regularly to provide training in development of better coping strategies in relation to her issues of anxiety   Follow up in 3 week(s).  Coordinate and keep Dr Robles about how her medication is working for her               
76
77
93
70

## 2024-05-08 ENCOUNTER — OUTPATIENT (OUTPATIENT)
Dept: OUTPATIENT SERVICES | Facility: HOSPITAL | Age: 64
LOS: 1 days | Discharge: HOME | End: 2024-05-08
Payer: MEDICARE

## 2024-05-08 VITALS
HEIGHT: 71 IN | WEIGHT: 225.97 LBS | TEMPERATURE: 96 F | OXYGEN SATURATION: 96 % | RESPIRATION RATE: 16 BRPM | DIASTOLIC BLOOD PRESSURE: 80 MMHG | SYSTOLIC BLOOD PRESSURE: 175 MMHG | HEART RATE: 85 BPM

## 2024-05-08 VITALS — RESPIRATION RATE: 15 BRPM | SYSTOLIC BLOOD PRESSURE: 197 MMHG | HEART RATE: 87 BPM | DIASTOLIC BLOOD PRESSURE: 93 MMHG

## 2024-05-08 DIAGNOSIS — I25.2 OLD MYOCARDIAL INFARCTION: ICD-10-CM

## 2024-05-08 DIAGNOSIS — H26.8 OTHER SPECIFIED CATARACT: ICD-10-CM

## 2024-05-08 DIAGNOSIS — E11.36 TYPE 2 DIABETES MELLITUS WITH DIABETIC CATARACT: ICD-10-CM

## 2024-05-08 DIAGNOSIS — Z95.1 PRESENCE OF AORTOCORONARY BYPASS GRAFT: Chronic | ICD-10-CM

## 2024-05-08 DIAGNOSIS — Z98.890 OTHER SPECIFIED POSTPROCEDURAL STATES: Chronic | ICD-10-CM

## 2024-05-08 DIAGNOSIS — E78.5 HYPERLIPIDEMIA, UNSPECIFIED: ICD-10-CM

## 2024-05-08 DIAGNOSIS — Z86.73 PERSONAL HISTORY OF TRANSIENT ISCHEMIC ATTACK (TIA), AND CEREBRAL INFARCTION WITHOUT RESIDUAL DEFICITS: ICD-10-CM

## 2024-05-08 DIAGNOSIS — I77.0 ARTERIOVENOUS FISTULA, ACQUIRED: Chronic | ICD-10-CM

## 2024-05-08 DIAGNOSIS — H25.811 COMBINED FORMS OF AGE-RELATED CATARACT, RIGHT EYE: ICD-10-CM

## 2024-05-08 DIAGNOSIS — M19.90 UNSPECIFIED OSTEOARTHRITIS, UNSPECIFIED SITE: ICD-10-CM

## 2024-05-08 DIAGNOSIS — Z79.02 LONG TERM (CURRENT) USE OF ANTITHROMBOTICS/ANTIPLATELETS: ICD-10-CM

## 2024-05-08 DIAGNOSIS — Z79.82 LONG TERM (CURRENT) USE OF ASPIRIN: ICD-10-CM

## 2024-05-08 DIAGNOSIS — N18.9 CHRONIC KIDNEY DISEASE, UNSPECIFIED: ICD-10-CM

## 2024-05-08 DIAGNOSIS — I12.9 HYPERTENSIVE CHRONIC KIDNEY DISEASE WITH STAGE 1 THROUGH STAGE 4 CHRONIC KIDNEY DISEASE, OR UNSPECIFIED CHRONIC KIDNEY DISEASE: ICD-10-CM

## 2024-05-08 DIAGNOSIS — Z95.5 PRESENCE OF CORONARY ANGIOPLASTY IMPLANT AND GRAFT: ICD-10-CM

## 2024-05-08 DIAGNOSIS — Z95.828 PRESENCE OF OTHER VASCULAR IMPLANTS AND GRAFTS: Chronic | ICD-10-CM

## 2024-05-08 DIAGNOSIS — Z79.4 LONG TERM (CURRENT) USE OF INSULIN: ICD-10-CM

## 2024-05-08 LAB — GLUCOSE BLDC GLUCOMTR-MCNC: 94 MG/DL — SIGNIFICANT CHANGE UP (ref 70–99)

## 2024-05-08 PROCEDURE — 82962 GLUCOSE BLOOD TEST: CPT

## 2024-05-08 PROCEDURE — V2632: CPT

## 2024-05-08 RX ORDER — ACETAMINOPHEN 500 MG
2 TABLET ORAL
Qty: 0 | Refills: 0 | DISCHARGE

## 2024-05-08 RX ORDER — CLOPIDOGREL BISULFATE 75 MG/1
1 TABLET, FILM COATED ORAL
Qty: 0 | Refills: 0 | DISCHARGE

## 2024-05-08 RX ORDER — INSULIN LISPRO 100/ML
5 VIAL (ML) SUBCUTANEOUS
Refills: 0 | DISCHARGE

## 2024-05-08 RX ORDER — SENNA PLUS 8.6 MG/1
2 TABLET ORAL
Qty: 0 | Refills: 0 | DISCHARGE

## 2024-05-08 RX ORDER — NITROGLYCERIN 6.5 MG
1 CAPSULE, EXTENDED RELEASE ORAL
Qty: 0 | Refills: 0 | DISCHARGE

## 2024-05-08 RX ORDER — ASPIRIN/CALCIUM CARB/MAGNESIUM 324 MG
1 TABLET ORAL
Qty: 0 | Refills: 0 | DISCHARGE

## 2024-05-08 RX ORDER — INSULIN DETEMIR 100/ML (3)
10 INSULIN PEN (ML) SUBCUTANEOUS
Qty: 0 | Refills: 0 | DISCHARGE

## 2024-05-08 RX ORDER — NIFEDIPINE 30 MG
1 TABLET, EXTENDED RELEASE 24 HR ORAL
Refills: 0 | DISCHARGE

## 2024-05-08 RX ORDER — SEVELAMER CARBONATE 2400 MG/1
1 POWDER, FOR SUSPENSION ORAL
Qty: 0 | Refills: 0 | DISCHARGE

## 2024-05-08 RX ORDER — GENTAMICIN SULFATE 40 MG/ML
80 VIAL (ML) INJECTION
Qty: 0 | Refills: 0 | DISCHARGE

## 2024-05-08 RX ORDER — PANTOPRAZOLE SODIUM 20 MG/1
1 TABLET, DELAYED RELEASE ORAL
Refills: 0 | DISCHARGE

## 2024-05-08 NOTE — ASU PATIENT PROFILE, ADULT - FALL HARM RISK - HARM RISK INTERVENTIONS

## 2024-05-08 NOTE — ASU PATIENT PROFILE, ADULT - NSICDXPASTMEDICALHX_GEN_ALL_CORE_FT
PAST MEDICAL HISTORY:  BPH (benign prostatic hyperplasia)     Chronic anemia     Chronic kidney disease (CKD) Stage IV    Diabetes mellitus     HLD (hyperlipidemia)     [place] (hard of hearing)     Hypertension     Myocardial infarction 2012    OA (osteoarthritis)     PAD (peripheral artery disease) S/p bypass left leg    Pain in left knee s/p fall    S/P CABG (coronary artery bypass graft) x4    Stented coronary artery in 2008    Transient ischemic attack (TIA) 2017; 2008     PAST MEDICAL HISTORY:  BPH (benign prostatic hyperplasia)     Chronic anemia     Chronic kidney disease (CKD) Stage IV    Diabetes mellitus     History of medical problems left arm precaution    HLD (hyperlipidemia)     [place] (hard of hearing)     Hypertension     Myocardial infarction 2012    OA (osteoarthritis)     PAD (peripheral artery disease) S/p bypass left leg    Pain in left knee s/p fall    S/P CABG (coronary artery bypass graft) x4    Stented coronary artery in 2008    Transient ischemic attack (TIA) 2017; 2008

## 2024-05-08 NOTE — ASU DISCHARGE PLAN (ADULT/PEDIATRIC) - NS MD DC FALL RISK RISK
For information on Fall & Injury Prevention, visit: [email or web address] OR  [email or web address] OR  https://www.cdc.gov/steadi/patient.html

## 2024-05-15 ENCOUNTER — OUTPATIENT (OUTPATIENT)
Dept: OUTPATIENT SERVICES | Facility: HOSPITAL | Age: 64
LOS: 1 days | End: 2024-05-15
Payer: MEDICARE

## 2024-05-15 ENCOUNTER — APPOINTMENT (OUTPATIENT)
Dept: PODIATRY | Facility: CLINIC | Age: 64
End: 2024-05-15
Payer: MEDICARE

## 2024-05-15 DIAGNOSIS — L97.821 NON-PRESSURE CHRONIC ULCER OF OTHER PART OF LEFT LOWER LEG LIMITED TO BREAKDOWN OF SKIN: ICD-10-CM

## 2024-05-15 DIAGNOSIS — Z00.00 ENCOUNTER FOR GENERAL ADULT MEDICAL EXAMINATION WITHOUT ABNORMAL FINDINGS: ICD-10-CM

## 2024-05-15 DIAGNOSIS — I77.0 ARTERIOVENOUS FISTULA, ACQUIRED: Chronic | ICD-10-CM

## 2024-05-15 DIAGNOSIS — Z95.1 PRESENCE OF AORTOCORONARY BYPASS GRAFT: Chronic | ICD-10-CM

## 2024-05-15 DIAGNOSIS — Z98.890 OTHER SPECIFIED POSTPROCEDURAL STATES: Chronic | ICD-10-CM

## 2024-05-15 DIAGNOSIS — L97.811 NON-PRESSURE CHRONIC ULCER OF OTHER PART OF RIGHT LOWER LEG LIMITED TO BREAKDOWN OF SKIN: ICD-10-CM

## 2024-05-15 DIAGNOSIS — Q66.80 CONGENITAL VERTICAL TALUS DEFORMITY, UNSPECIFIED FOOT: ICD-10-CM

## 2024-05-15 DIAGNOSIS — Z95.828 PRESENCE OF OTHER VASCULAR IMPLANTS AND GRAFTS: Chronic | ICD-10-CM

## 2024-05-15 PROCEDURE — 29580 STRAPPING UNNA BOOT: CPT | Mod: 50

## 2024-05-20 PROBLEM — Z87.898 PERSONAL HISTORY OF OTHER SPECIFIED CONDITIONS: Chronic | Status: ACTIVE | Noted: 2024-05-08

## 2024-05-21 PROBLEM — Q66.80: Status: ACTIVE | Noted: 2023-12-28

## 2024-05-21 PROBLEM — L97.821 NON-PRS CHR ULCER OTH PRT L LOW LEG LIMITED TO BRKDWN SKIN: Status: ACTIVE | Noted: 2024-01-12

## 2024-05-21 PROBLEM — L97.811 NON-PRS CHR ULCER OTH PRT R LOW LEG LIMITED TO BRKDWN SKIN: Status: ACTIVE | Noted: 2023-12-20

## 2024-05-21 NOTE — PHYSICAL EXAM
[Foot Ulcer] : foot ulcer [Ankle Swelling (On Exam)] : present [Ankle Swelling Bilaterally] : bilaterally  [Ankle Swelling On The Left] : moderate [de-identified] : Rocker bottom deformity L foot  L 5th toe amp  [FreeTextEntry1] : Superficial B/L LE wounds

## 2024-05-21 NOTE — HISTORY OF PRESENT ILLNESS
[Wheelchair] : a wheelchair [FreeTextEntry1] :  - hx of 5th toe amp L foot - LE weakness  -B/L LE swelling with superficial ulcers

## 2024-05-21 NOTE — PROCEDURE
[] : A well-padded Unna Boot was applied to both lower extremities. [FreeTextEntry1] : dressings removed. pt with superficial breakdowns to calves. dressed with silvadene,xeroform. unna bots applied bilaterally 2nd to moderate edema. pt to return in 2 weeks.

## 2024-05-22 ENCOUNTER — APPOINTMENT (OUTPATIENT)
Dept: PODIATRY | Facility: CLINIC | Age: 64
End: 2024-05-22
Payer: MEDICARE

## 2024-05-22 ENCOUNTER — OUTPATIENT (OUTPATIENT)
Dept: OUTPATIENT SERVICES | Facility: HOSPITAL | Age: 64
LOS: 1 days | End: 2024-05-22
Payer: MEDICARE

## 2024-05-22 DIAGNOSIS — I77.0 ARTERIOVENOUS FISTULA, ACQUIRED: Chronic | ICD-10-CM

## 2024-05-22 DIAGNOSIS — M14.60 CHARCOT'S JOINT, UNSPECIFIED SITE: ICD-10-CM

## 2024-05-22 DIAGNOSIS — L97.422 NON-PRESSURE CHRONIC ULCER OF LEFT HEEL AND MIDFOOT WITH FAT LAYER EXPOSED: ICD-10-CM

## 2024-05-22 DIAGNOSIS — L97.421 NON-PRESSURE CHRONIC ULCER OF LEFT HEEL AND MIDFOOT LIMITED TO BREAKDOWN OF SKIN: ICD-10-CM

## 2024-05-22 DIAGNOSIS — Z00.00 ENCOUNTER FOR GENERAL ADULT MEDICAL EXAMINATION WITHOUT ABNORMAL FINDINGS: ICD-10-CM

## 2024-05-22 DIAGNOSIS — Z98.890 OTHER SPECIFIED POSTPROCEDURAL STATES: Chronic | ICD-10-CM

## 2024-05-22 DIAGNOSIS — M79.89 OTHER SPECIFIED SOFT TISSUE DISORDERS: ICD-10-CM

## 2024-05-22 DIAGNOSIS — Z95.828 PRESENCE OF OTHER VASCULAR IMPLANTS AND GRAFTS: Chronic | ICD-10-CM

## 2024-05-22 DIAGNOSIS — Z95.1 PRESENCE OF AORTOCORONARY BYPASS GRAFT: Chronic | ICD-10-CM

## 2024-05-22 DIAGNOSIS — I73.9 PERIPHERAL VASCULAR DISEASE, UNSPECIFIED: ICD-10-CM

## 2024-05-22 DIAGNOSIS — E11.42 TYPE 2 DIABETES MELLITUS WITH DIABETIC POLYNEUROPATHY: ICD-10-CM

## 2024-05-22 PROCEDURE — 99214 OFFICE O/P EST MOD 30 MIN: CPT

## 2024-05-22 PROCEDURE — 99214 OFFICE O/P EST MOD 30 MIN: CPT | Mod: 95

## 2024-05-28 PROBLEM — L97.421 CHRONIC ULCER OF LEFT HEEL LIMITED TO BREAKDOWN OF SKIN: Status: ACTIVE | Noted: 2024-04-02

## 2024-05-28 PROBLEM — I73.9 PVD (PERIPHERAL VASCULAR DISEASE): Status: ACTIVE | Noted: 2018-01-30

## 2024-05-28 PROBLEM — M14.60 CHARCOT'S JOINT: Status: ACTIVE | Noted: 2023-06-14

## 2024-05-28 PROBLEM — E11.42 CONTROLLED TYPE 2 DIABETES MELLITUS WITH DIABETIC POLYNEUROPATHY, UNSPECIFIED WHETHER LONG TERM INSULIN USE: Status: ACTIVE | Noted: 2022-12-21

## 2024-05-28 PROBLEM — L97.422 CHRONIC ULCER OF LEFT HEEL WITH FAT LAYER EXPOSED: Status: ACTIVE | Noted: 2023-03-02

## 2024-05-28 PROBLEM — M79.89 LEG SWELLING: Status: ACTIVE | Noted: 2023-05-31

## 2024-05-28 NOTE — PROCEDURE
[] : A well-padded Unna Boot was applied to both lower extremities. [FreeTextEntry1] : dressings removed, legs cleaned. '1' wound remains to right medial calf. 2.5 cm dm superficial. dressed with bacitracin 2x2s. compression dressings of unna boot,coban and tubigrips bilaterally. edema decreasing. pt to return in 2 weeks.

## 2024-05-28 NOTE — ASSESSMENT
[FreeTextEntry1] : patient evaluated.  Cont wound care Alfonso archibald applied B/L LE  RTO 2 weeks with Jose D

## 2024-05-28 NOTE — PHYSICAL EXAM
[Foot Ulcer] : foot ulcer [Ankle Swelling (On Exam)] : present [Ankle Swelling Bilaterally] : bilaterally  [Ankle Swelling On The Left] : moderate [de-identified] : Rocker bottom deformity L foot  L 5th toe amp  [FreeTextEntry1] : Superficial B/L LE wounds

## 2024-05-29 DIAGNOSIS — M14.60 CHARCOT'S JOINT, UNSPECIFIED SITE: ICD-10-CM

## 2024-05-29 DIAGNOSIS — L97.811 NON-PRESSURE CHRONIC ULCER OF OTHER PART OF RIGHT LOWER LEG LIMITED TO BREAKDOWN OF SKIN: ICD-10-CM

## 2024-05-29 DIAGNOSIS — Q66.80 CONGENITAL VERTICAL TALUS DEFORMITY, UNSPECIFIED FOOT: ICD-10-CM

## 2024-05-29 DIAGNOSIS — L97.421 NON-PRESSURE CHRONIC ULCER OF LEFT HEEL AND MIDFOOT LIMITED TO BREAKDOWN OF SKIN: ICD-10-CM
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
## 2024-05-29 DIAGNOSIS — L97.821 NON-PRESSURE CHRONIC ULCER OF OTHER PART OF LEFT LOWER LEG LIMITED TO BREAKDOWN OF SKIN: ICD-10-CM

## 2024-05-29 DIAGNOSIS — I73.9 PERIPHERAL VASCULAR DISEASE, UNSPECIFIED: ICD-10-CM
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
## 2024-05-29 DIAGNOSIS — M79.89 OTHER SPECIFIED SOFT TISSUE DISORDERS: ICD-10-CM

## 2024-05-29 DIAGNOSIS — L97.422 NON-PRESSURE CHRONIC ULCER OF LEFT HEEL AND MIDFOOT WITH FAT LAYER EXPOSED: ICD-10-CM

## 2024-05-29 DIAGNOSIS — E11.42 TYPE 2 DIABETES MELLITUS WITH DIABETIC POLYNEUROPATHY: ICD-10-CM

## 2024-06-04 DIAGNOSIS — I77.0 ARTERIOVENOUS FISTULA, ACQUIRED: ICD-10-CM

## 2024-06-05 ENCOUNTER — APPOINTMENT (OUTPATIENT)
Dept: VASCULAR SURGERY | Facility: CLINIC | Age: 64
End: 2024-06-05

## 2024-06-10 NOTE — PATIENT PROFILE ADULT - FUNCTIONAL ASSESSMENT - DAILY ACTIVITY 5.
Go for blood tests as directed. Your doctor will do lab tests at regular visits to monitor the effects of this medicine. Please follow up with your doctor and keep your health care provider appointments. 4 = No assist / stand by assistance

## 2024-06-22 NOTE — PATIENT PROFILE ADULT. - NS PRO OT REFERRAL QUES 1 YN
no no abrasion/no bleeding/no confusion/no deformity/no fever/no loss of consciousness/no numbness/no tingling/no vomiting/no weakness

## 2024-07-03 ENCOUNTER — APPOINTMENT (OUTPATIENT)
Dept: PODIATRY | Facility: CLINIC | Age: 64
End: 2024-07-03
Payer: MEDICARE

## 2024-07-03 ENCOUNTER — OUTPATIENT (OUTPATIENT)
Dept: OUTPATIENT SERVICES | Facility: HOSPITAL | Age: 64
LOS: 1 days | End: 2024-07-03
Payer: MEDICARE

## 2024-07-03 DIAGNOSIS — I73.9 PERIPHERAL VASCULAR DISEASE, UNSPECIFIED: ICD-10-CM

## 2024-07-03 DIAGNOSIS — I77.0 ARTERIOVENOUS FISTULA, ACQUIRED: Chronic | ICD-10-CM

## 2024-07-03 DIAGNOSIS — Z98.890 OTHER SPECIFIED POSTPROCEDURAL STATES: Chronic | ICD-10-CM

## 2024-07-03 DIAGNOSIS — Z95.828 PRESENCE OF OTHER VASCULAR IMPLANTS AND GRAFTS: Chronic | ICD-10-CM

## 2024-07-03 DIAGNOSIS — Z00.00 ENCOUNTER FOR GENERAL ADULT MEDICAL EXAMINATION WITHOUT ABNORMAL FINDINGS: ICD-10-CM

## 2024-07-03 DIAGNOSIS — Z95.1 PRESENCE OF AORTOCORONARY BYPASS GRAFT: Chronic | ICD-10-CM

## 2024-07-03 PROCEDURE — 29580 STRAPPING UNNA BOOT: CPT

## 2024-07-03 PROCEDURE — 29580 STRAPPING UNNA BOOT: CPT | Mod: 50

## 2024-07-16 DIAGNOSIS — L97.811 NON-PRESSURE CHRONIC ULCER OF OTHER PART OF RIGHT LOWER LEG LIMITED TO BREAKDOWN OF SKIN: ICD-10-CM

## 2024-07-16 DIAGNOSIS — I73.9 PERIPHERAL VASCULAR DISEASE, UNSPECIFIED: ICD-10-CM
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
## 2024-07-16 DIAGNOSIS — L97.821 NON-PRESSURE CHRONIC ULCER OF OTHER PART OF LEFT LOWER LEG LIMITED TO BREAKDOWN OF SKIN: ICD-10-CM
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
## 2024-07-17 ENCOUNTER — APPOINTMENT (OUTPATIENT)
Dept: PODIATRY | Facility: CLINIC | Age: 64
End: 2024-07-17
Payer: MEDICARE

## 2024-07-17 ENCOUNTER — OUTPATIENT (OUTPATIENT)
Dept: OUTPATIENT SERVICES | Facility: HOSPITAL | Age: 64
LOS: 1 days | End: 2024-07-17
Payer: MEDICARE

## 2024-07-17 DIAGNOSIS — Z95.828 PRESENCE OF OTHER VASCULAR IMPLANTS AND GRAFTS: Chronic | ICD-10-CM

## 2024-07-17 DIAGNOSIS — Z98.890 OTHER SPECIFIED POSTPROCEDURAL STATES: Chronic | ICD-10-CM

## 2024-07-17 DIAGNOSIS — M79.89 OTHER SPECIFIED SOFT TISSUE DISORDERS: ICD-10-CM

## 2024-07-17 DIAGNOSIS — Z00.00 ENCOUNTER FOR GENERAL ADULT MEDICAL EXAMINATION WITHOUT ABNORMAL FINDINGS: ICD-10-CM

## 2024-07-17 DIAGNOSIS — L97.811 NON-PRESSURE CHRONIC ULCER OF OTHER PART OF RIGHT LOWER LEG LIMITED TO BREAKDOWN OF SKIN: ICD-10-CM

## 2024-07-17 DIAGNOSIS — M14.60 CHARCOT'S JOINT, UNSPECIFIED SITE: ICD-10-CM

## 2024-07-17 DIAGNOSIS — L97.821 NON-PRESSURE CHRONIC ULCER OF OTHER PART OF LEFT LOWER LEG LIMITED TO BREAKDOWN OF SKIN: ICD-10-CM

## 2024-07-17 DIAGNOSIS — Z95.1 PRESENCE OF AORTOCORONARY BYPASS GRAFT: Chronic | ICD-10-CM

## 2024-07-17 DIAGNOSIS — I77.0 ARTERIOVENOUS FISTULA, ACQUIRED: Chronic | ICD-10-CM

## 2024-07-17 PROCEDURE — 29580 STRAPPING UNNA BOOT: CPT | Mod: 50

## 2024-07-17 PROCEDURE — 29580 STRAPPING UNNA BOOT: CPT
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
## 2024-07-23 DIAGNOSIS — L97.821 NON-PRESSURE CHRONIC ULCER OF OTHER PART OF LEFT LOWER LEG LIMITED TO BREAKDOWN OF SKIN: ICD-10-CM

## 2024-07-23 DIAGNOSIS — M14.60 CHARCOT'S JOINT, UNSPECIFIED SITE: ICD-10-CM

## 2024-07-23 DIAGNOSIS — M79.89 OTHER SPECIFIED SOFT TISSUE DISORDERS: ICD-10-CM
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
## 2024-07-23 DIAGNOSIS — L97.811 NON-PRESSURE CHRONIC ULCER OF OTHER PART OF RIGHT LOWER LEG LIMITED TO BREAKDOWN OF SKIN: ICD-10-CM

## 2024-07-24 NOTE — PROGRESS NOTE ADULT - SUBJECTIVE AND OBJECTIVE BOX
SCHWABACHER, LAWRENCE  62y, Male  Allergy: No Known Allergies      LOS  17d    CHIEF COMPLAINT: Sepsis (31 Oct 2022 07:56)      INTERVAL EVENTS/HPI  - No acute events overnight  - T(F): , Max: 97.1 (10-30-22 @ 12:57)  - OR cancelled today   - WBC Count: 8.67 (10-31-22 @ 05:15)     - Creatinine, Serum: 7.3 (10-31-22 @ 05:15)       ROS  General: Denies rigors, nightsweats  HEENT: Denies headache, rhinorrhea, sore throat, eye pain  CV: Denies CP, palpitations  PULM: Denies wheezing, hemoptysis  GI: Denies hematemesis, hematochezia, melena  : Denies discharge, hematuria  MSK: Denies arthralgias, myalgias  SKIN: Denies rash, lesions  NEURO: Denies paresthesias, weakness  PSYCH: Denies depression, anxiety    VITALS:  T(F): 97, Max: 97.1 (10-30-22 @ 12:57)  HR: 86  BP: 103/68  RR: 18Vital Signs Last 24 Hrs  T(C): 36.1 (31 Oct 2022 05:10), Max: 36.2 (30 Oct 2022 12:57)  T(F): 97 (31 Oct 2022 05:10), Max: 97.1 (30 Oct 2022 12:57)  HR: 86 (31 Oct 2022 08:05) (70 - 99)  BP: 103/68 (31 Oct 2022 08:05) (103/68 - 146/75)  BP(mean): --  RR: 18 (31 Oct 2022 08:05) (18 - 18)  SpO2: 98% (30 Oct 2022 12:57) (98% - 98%)    Parameters below as of 31 Oct 2022 08:05  Patient On (Oxygen Delivery Method): room air        PHYSICAL EXAM:  Gen: NAD, resting in bed  HEENT: Normocephalic, atraumatic  Neck: supple, no lymphadenopathy  CV: Regular rate & regular rhythm  Lungs: decreased BS at bases, no fremitus  Abdomen: Soft, BS present  Ext: Warm, well perfused  Neuro: non focal, awake  Skin: no rash, no erythema  Lines: no phlebitis    FH: Non-contributory  Social Hx: Non-contributory    TESTS & MEASUREMENTS:                        7.2    8.67  )-----------( 238      ( 31 Oct 2022 05:15 )             22.1     10-31    133<L>  |  94<L>  |  61<HH>  ----------------------------<  106<H>  4.6   |  25  |  7.3<HH>    Ca    7.9<L>      31 Oct 2022 05:15  Mg     2.0     10-31              Culture - Urine (collected 10-25-22 @ 20:46)  Source: Clean Catch Clean Catch (Midstream)  Final Report (10-27-22 @ 00:19):    No growth    Culture - Tissue with Gram Stain (collected 10-21-22 @ 14:05)  Source: .Tissue None  Gram Stain (10-22-22 @ 01:55):    No polymorphonuclear leukocytes seen per low power field    No organisms seen per oil power field  Final Report (10-26-22 @ 15:47):    Moderate Proteus mirabilis    Rare Pseudomonas putida group    Rare Enterococcus faecium (vancomycin resistant)    Rare Staphylococcus haemolyticus  Organism: Proteus mirabilis  Pseudomonas putida group  Enterococcus faecium (vancomycin resistant)  Staphylococcus haemolyticus (10-26-22 @ 15:47)  Organism: Staphylococcus haemolyticus (10-26-22 @ 15:47)      -  Ampicillin/Sulbactam: R 16/8      -  Cefazolin: R >16      -  Clindamycin: R >4      -  Erythromycin: R >4      -  Gentamicin: R >8 Should not be used as monotherapy      -  Oxacillin: R >2      -  Penicillin: R >8      -  Rifampin: R >2 Should not be used as monotherapy      -  Tetra/Doxy: S <=1      -  Trimethoprim/Sulfamethoxazole: R >2/38      -  Vancomycin: S 4      Method Type: CONNER  Organism: Enterococcus faecium (vancomycin resistant) (10-26-22 @ 15:47)      -  Ampicillin: R >8 Predicts results to ampicillin/sulbactam, amoxacillin-clavulanate and  piperacillin-tazobactam.      -  Daptomycin: SDD 4 The breakpoint for SDD (sensitive dose dependent)is based on a dosage regimen of 8-12 mg/kg administered every 24 h in adults and is intended for serious infections due to E. faecium. Consultation with an infectious diseases specialist is recommended.      -  Levofloxacin: R >4      -  Linezolid: S 2      -  Tetra/Doxy: R >8      -  Vancomycin: R >16      Method Type: CONNER  Organism: Pseudomonas putida group (10-26-22 @ 15:47)      -  Amikacin: S <=16      -  Aztreonam: S <=4      -  Cefepime: S <=2      -  Ceftriaxone: S <=1      -  Ciprofloxacin: S <=0.25      -  Gentamicin: S <=2      -  Levofloxacin: S <=0.5      -  Meropenem: S <=1      -  Piperacillin/Tazobactam: S <=8      -  Tobramycin: S <=2      -  Trimethoprim/Sulfamethoxazole: S <=0.5/9.5      Method Type: CONNER  Organism: Proteus mirabilis (10-26-22 @ 15:47)      -  Amikacin: S <=16      -  Amoxicillin/Clavulanic Acid: S <=8/4      -  Ampicillin: S <=8 These ampicillin results predict results for amoxicillin      -  Ampicillin/Sulbactam: S <=4/2 Enterobacter, Klebsiella aerogenes, Citrobacter, and Serratia may develop resistance during prolonged therapy (3-4 days)      -  Aztreonam: S <=4      -  Cefazolin: S <=2 Enterobacter, Klebsiella aerogenes, Citrobacter, and Serratia may develop resistance during prolonged therapy (3-4 days)      -  Cefepime: S <=2      -  Cefoxitin: S <=8      -  Ceftriaxone: S <=1 Enterobacter, Klebsiella aerogenes, Citrobacter, and Serratia may develop resistance during prolonged therapy      -  Ciprofloxacin: S <=0.25      -  Ertapenem: S <=0.5      -  Gentamicin: S <=2      -  Levofloxacin: S <=0.5      -  Meropenem: S <=1      -  Piperacillin/Tazobactam: S <=8      -  Tobramycin: S <=2      -  Trimethoprim/Sulfamethoxazole: S <=0.5/9.5      Method Type: CONNER    Culture - Surgical Swab (collected 10-21-22 @ 14:04)  Source: .Surgical Swab None  Final Report (10-26-22 @ 16:01):    Rare Proteus vulgaris group    Few Enterococcus faecium (vancomycin resistant)    Rare Pseudomonas putida group    Rare Coag Negative Staphylococcus "Susceptibilities not performed"  Organism: Proteus vulgaris group  Enterococcus faecium (vancomycin resistant)  Pseudomonas putida group (10-26-22 @ 16:01)  Organism: Pseudomonas putida group (10-26-22 @ 16:01)      -  Amikacin: S <=16      -  Aztreonam: S 8      -  Cefepime: S <=2      -  Ceftriaxone: S 8      -  Ciprofloxacin: S <=0.25      -  Gentamicin: S <=2      -  Levofloxacin: S <=0.5      -  Meropenem: S <=1      -  Piperacillin/Tazobactam: S <=8      -  Tobramycin: S <=2      -  Trimethoprim/Sulfamethoxazole: R >2/38      Method Type: CONNER  Organism: Enterococcus faecium (vancomycin resistant) (10-26-22 @ 16:01)      -  Ampicillin: R >8 Predicts results to ampicillin/sulbactam, amoxacillin-clavulanate and  piperacillin-tazobactam.      -  Daptomycin: SDD 4 The breakpoint for SDD (sensitive dose dependent)is based on a dosage regimen of 8-12 mg/kg administered every 24 h in adults and is intended for serious infections due to E. faecium. Consultation with an infectious diseases specialist is recommended.      -  Levofloxacin: R >4      -  Linezolid: S 2      -  Tetra/Doxy: R >8      -  Vancomycin: R >16      Method Type: CONNER  Organism: Proteus vulgaris group (10-26-22 @ 16:01)      -  Amikacin: S <=16      -  Amoxicillin/Clavulanic Acid: S <=8/4      -  Ampicillin: R <=8 These ampicillin results predict results for amoxicillin      -  Ampicillin/Sulbactam: S <=4/2 Enterobacter, Klebsiella aerogenes, Citrobacter, and Serratia may develop resistance during prolonged therapy (3-4 days)      -  Aztreonam: S <=4      -  Cefazolin: R <=2 Enterobacter, Klebsiella aerogenes, Citrobacter, and Serratia may develop resistance during prolonged therapy (3-4 days)      -  Cefepime: S <=2      -  Cefoxitin: S <=8      -  Ceftriaxone: S <=1 Enterobacter, Klebsiella aerogenes, Citrobacter, and Serratia may develop resistance during prolonged therapy      -  Ciprofloxacin: S <=0.25      -  Ertapenem: S <=0.5      -  Gentamicin: S <=2      -  Levofloxacin: S <=0.5      -  Meropenem: S <=1      -  Piperacillin/Tazobactam: S <=8      -  Tobramycin: S <=2      -  Trimethoprim/Sulfamethoxazole: S <=0.5/9.5      Method Type: CONNER    Culture - Abscess with Gram Stain (collected 10-16-22 @ 17:50)  Source: .Abscess left foot wound  Final Report (10-21-22 @ 18:00):    Rare Morganella morganii    Rare Enterobacter cloacae complex    Rare Proteus vulgaris group    Rare Enterococcus faecium (vancomycin resistant)  Organism: Morganella morganii  Enterobacter cloacae complex  Proteus vulgaris group  Enterococcus faecium (vancomycin resistant) (10-21-22 @ 18:00)  Organism: Enterococcus faecium (vancomycin resistant) (10-21-22 @ 18:00)      -  Ampicillin: R >8 Predicts results to ampicillin/sulbactam, amoxacillin-clavulanate and  piperacillin-tazobactam.      -  Daptomycin: SDD 4 The breakpoint for SDD (sensitive dose dependent)is based on a dosage regimen of 8-12 mg/kg administered every 24 h in adults and is intended for serious infections due to E. faecium. Consultation with an infectious diseases specialist is recommended.      -  Levofloxacin: R >4      -  Linezolid: S 1      -  Tetra/Doxy: R >8      -  Vancomycin: R >16      Method Type: CONNER  Organism: Proteus vulgaris group (10-21-22 @ 18:00)      -  Amikacin: S <=16      -  Amoxicillin/Clavulanic Acid: S <=8/4      -  Ampicillin: R >16 These ampicillin results predict results for amoxicillin      -  Ampicillin/Sulbactam: S <=4/2 Enterobacter, Klebsiella aerogenes, Citrobacter, and Serratia may develop resistance during prolonged therapy (3-4 days)      -  Aztreonam: S <=4      -  Cefazolin: R >16 Enterobacter, Klebsiella aerogenes, Citrobacter, and Serratia may develop resistance during prolonged therapy (3-4 days)      -  Cefepime: S <=2      -  Cefoxitin: S <=8      -  Ceftriaxone: S <=1 Enterobacter, Klebsiella aerogenes, Citrobacter, and Serratia may develop resistance during prolonged therapy      -  Ciprofloxacin: S <=0.25      -  Ertapenem: S <=0.5      -  Gentamicin: S <=2      -  Levofloxacin: S <=0.5      -  Meropenem: S <=1      -  Piperacillin/Tazobactam: S <=8      -  Tobramycin: S <=2      -  Trimethoprim/Sulfamethoxazole: S <=0.5/9.5      Method Type: CONNER  Organism: Enterobacter cloacae complex (10-21-22 @ 18:00)      -  Amikacin: S <=16      -  Amoxicillin/Clavulanic Acid: R 16/8      -  Ampicillin: R >16 These ampicillin results predict results for amoxicillin      -  Ampicillin/Sulbactam: R <=4/2 Enterobacter, Klebsiella aerogenes, Citrobacter, and Serratia may develop resistance during prolonged therapy (3-4 days)      -  Aztreonam: S 8      -  Cefazolin: R >16 Enterobacter, Klebsiella aerogenes, Citrobacter, and Serratia may develop resistance during prolonged therapy (3-4 days)      -  Cefepime: S <=2      -  Cefoxitin: R 16      -  Ceftriaxone: R >32 Enterobacter, Klebsiella aerogenes, Citrobacter, and Serratia may develop resistance during prolonged therapy      -  Ciprofloxacin: S <=0.25      -  Ertapenem: S <=0.5      -  Gentamicin: S <=2      -  Imipenem: S <=1      -  Levofloxacin: S <=0.5      -  Meropenem: S <=1      -  Piperacillin/Tazobactam: S <=8      -  Tobramycin: S <=2      -  Trimethoprim/Sulfamethoxazole: S 1/19      Method Type: CONNER  Organism: Morganella morganii (10-21-22 @ 18:00)      -  Amikacin: S <=16      -  Amoxicillin/Clavulanic Acid: R >16/8      -  Ampicillin: R >16 These ampicillin results predict results for amoxicillin      -  Ampicillin/Sulbactam: I 16/8 Enterobacter, Klebsiella aerogenes, Citrobacter, and Serratia may develop resistance during prolonged therapy (3-4 days)      -  Aztreonam: S <=4      -  Cefazolin: R >16 Enterobacter, Klebsiella aerogenes, Citrobacter, and Serratia may develop resistance during prolonged therapy (3-4 days)      -  Cefepime: S <=2      -  Cefoxitin: S <=8      -  Ceftriaxone: S <=1 Enterobacter, Klebsiella aerogenes, Citrobacter, and Serratia may develop resistance during prolonged therapy      -  Ciprofloxacin: S <=0.25      -  Ertapenem: S <=0.5      -  Gentamicin: S <=2      -  Imipenem: S <=1      -  Levofloxacin: S <=0.5      -  Meropenem: S <=1      -  Piperacillin/Tazobactam: S <=8      -  Tobramycin: S <=2      -  Trimethoprim/Sulfamethoxazole: S <=0.5/9.5      Method Type: CONNER    Culture - Blood (collected 10-15-22 @ 12:07)  Source: .Blood None  Final Report (10-20-22 @ 21:00):    No Growth Final            INFECTIOUS DISEASES TESTING  COVID-19 PCR: NotDetec (10-30-22 @ 12:40)  COVID-19 PCR: NotDetec (10-26-22 @ 13:06)  COVID-19 PCR: NotDetec (10-17-22 @ 20:19)  COVID-19 PCR: Detected (09-20-22 @ 12:50)  COVID-19 PCR: NotDetec (09-14-22 @ 13:45)  COVID-19 PCR: NotDetec (09-06-22 @ 01:35)  COVID-19 PCR: NotDetec (03-02-22 @ 14:15)  COVID-19 PCR: NotDetec (02-27-22 @ 15:30)  COVID-19 PCR: NotDetec (02-23-22 @ 11:50)  COVID-19 PCR: NotDetec (02-21-22 @ 10:25)      INFLAMMATORY MARKERS  C-Reactive Protein, Serum: 114.2 mg/L (10-16-22 @ 12:26)  Sedimentation Rate, Erythrocyte: 140 mm/Hr (10-16-22 @ 12:26)  Sedimentation Rate, Erythrocyte: 142 mm/Hr (10-15-22 @ 09:53)  C-Reactive Protein, Serum: 141.8 mg/L (10-15-22 @ 09:53)      RADIOLOGY & ADDITIONAL TESTS:  I have personally reviewed the last available Chest xray  CXR      CT      CARDIOLOGY TESTING  12 Lead ECG:   Ventricular Rate 92 BPM    Atrial Rate 92 BPM    P-R Interval 192 ms    QRS Duration 104 ms    Q-T Interval 428 ms    QTC Calculation(Bazett) 529 ms    R Axis 82 degrees    T Axis 130 degrees    Diagnosis Line Normal sinus rhythm  Incomplete right bundle branch block  ST & T wave abnormality, consider inferior ischemia  Prolonged QT  Abnormal ECG    Confirmed by Yusef Isaacs ([id number]) on 10/28/2022 12:12:54 PM (10-28-22 @ 11:42)  12 Lead ECG:   Ventricular Rate 74 BPM    Atrial Rate 74 BPM    P-R Interval 174 ms    QRS Duration 100 ms    Q-T Interval 454 ms    QTC Calculation(Bazett) 503 ms    P Axis 29 degrees    R Axis 138 degrees    T Axis 87 degrees    Diagnosis Line Normal sinus rhythm  Right axis deviation  Nonspecific T wave abnormality  Prolonged QT  Abnormal ECG    Confirmed by INGRIS JACOME MD ([id number]) on 10/28/2022 7:10:27 AM (10-27-22 @ 17:38)      MEDICATIONS  aspirin enteric coated 81 Oral daily  atorvastatin 40 Oral at bedtime  Dakins Solution - 1/2 Strength 1 Topical daily  DAPTOmycin IVPB 950 IV Intermittent every 48 hours  darbepoetin Injectable Syringe 40 IV Push <User Schedule>  influenza   Vaccine 0.5 IntraMuscular once  insulin glargine Injectable (LANTUS) 12 SubCutaneous at bedtime  insulin lispro (ADMELOG) corrective regimen sliding scale  SubCutaneous three times a day before meals  meropenem  IVPB 1000 IV Intermittent every 24 hours  NIFEdipine XL 30 Oral daily  polyethylene glycol 3350 17 Oral daily  senna 1 Oral two times a day  sevelamer carbonate 800 Oral three times a day  tamsulosin 0.4 Oral at bedtime      WEIGHT  Weight (kg): 90.1 (10-31-22 @ 05:06)  Creatinine, Serum: 7.3 mg/dL (10-31-22 @ 05:15)      ANTIBIOTICS:  DAPTOmycin IVPB 950 milliGRAM(s) IV Intermittent every 48 hours  meropenem  IVPB 1000 milliGRAM(s) IV Intermittent every 24 hours      All available historical records have been reviewed       Detail Level: Detailed Depth Of Biopsy: dermis Was A Bandage Applied: Yes Size Of Lesion In Cm: 0.9 X Size Of Lesion In Cm: 0.8 Biopsy Type: H and E Biopsy Method: Dermablade Anesthesia Type: 1% lidocaine with epinephrine Anesthesia Volume In Cc: 1 Additional Anesthesia Volume In Cc (Will Not Render If 0): 0 Hemostasis: Aluminum Chloride and Electrocautery Wound Care: Vaseline Dressing: pressure dressing with telfa Destruction After The Procedure: No Type Of Destruction Used: Electrodesiccation Curettage Text: The wound bed was treated with curettage after the biopsy was performed. Cryotherapy Text: The wound bed was treated with cryotherapy after the biopsy was performed. Electrodesiccation Text: The wound bed was treated with electrodesiccation after the biopsy was performed. Electrodesiccation And Curettage Text: The wound bed was treated with electrodesiccation and curettage after the biopsy was performed. Silver Nitrate Text: The wound bed was treated with silver nitrate after the biopsy was performed. Lab: [id number] Lab Facility: [id number] Notes (To The Dermatopathologist): Please check margins. Consent: The provider's intent is to obtain a tissue sample solely for diagnostic purposes.  Written consent to obtain tissue sample was obtained and risks were reviewed including but not limited to scarring, infection, bleeding, scabbing, incomplete removal, nerve damage and allergy to anesthesia. Post-Care Instructions: I reviewed with the patient in detail post-care instructions. Patient is to keep the biopsy site dry overnight, and then apply bacitracin twice daily until healed. Patient may apply hydrogen peroxide soaks to remove any crusting. Notification Instructions: Patient will be notified of biopsy results. However, patient instructed to call the office if not contacted within 2 weeks. Billing Type: Third-Party Bill Information: Selecting Yes will display possible errors in your note based on the variables you have selected. This validation is only offered as a suggestion for you. PLEASE NOTE THAT THE VALIDATION TEXT WILL BE REMOVED WHEN YOU FINALIZE YOUR NOTE. IF YOU WANT TO FAX A PRELIMINARY NOTE YOU WILL NEED TO TOGGLE THIS TO 'NO' IF YOU DO NOT WANT IT IN YOUR FAXED NOTE.

## 2024-07-31 ENCOUNTER — APPOINTMENT (OUTPATIENT)
Dept: PODIATRY | Facility: CLINIC | Age: 64
End: 2024-07-31
Payer: MEDICARE

## 2024-07-31 ENCOUNTER — OUTPATIENT (OUTPATIENT)
Dept: OUTPATIENT SERVICES | Facility: HOSPITAL | Age: 64
LOS: 1 days | End: 2024-07-31
Payer: MEDICARE

## 2024-07-31 DIAGNOSIS — Z95.828 PRESENCE OF OTHER VASCULAR IMPLANTS AND GRAFTS: Chronic | ICD-10-CM

## 2024-07-31 DIAGNOSIS — I77.0 ARTERIOVENOUS FISTULA, ACQUIRED: Chronic | ICD-10-CM

## 2024-07-31 DIAGNOSIS — Z95.1 PRESENCE OF AORTOCORONARY BYPASS GRAFT: Chronic | ICD-10-CM

## 2024-07-31 DIAGNOSIS — Z00.00 ENCOUNTER FOR GENERAL ADULT MEDICAL EXAMINATION WITHOUT ABNORMAL FINDINGS: ICD-10-CM

## 2024-07-31 DIAGNOSIS — Z98.890 OTHER SPECIFIED POSTPROCEDURAL STATES: Chronic | ICD-10-CM

## 2024-07-31 PROCEDURE — 29581 APPL MULTLAYER CMPRN SYS LEG: CPT | Mod: 50

## 2024-07-31 PROCEDURE — 29580 STRAPPING UNNA BOOT: CPT | Mod: 50

## 2024-07-31 NOTE — PROCEDURE
[] : A layered compression dressing was applied to both lower extremities, which included an Unna boot, cast padding, Coband, and Tubigrip. [FreeTextEntry1] : dressings removed, legs cleaned. dry skin sloughing off, left ankle pin point bleed treated with bacitracin,xeroform. legs moisturized with a and d ointmwent, unna boots,coban and tubigrips applied bilaterally. pt to return in 2 to 3 weeks.
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
## 2024-08-08 DIAGNOSIS — R60.9 EDEMA, UNSPECIFIED: ICD-10-CM
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
## 2024-08-08 DIAGNOSIS — E11.42 TYPE 2 DIABETES MELLITUS WITH DIABETIC POLYNEUROPATHY: ICD-10-CM

## 2024-08-14 ENCOUNTER — APPOINTMENT (OUTPATIENT)
Dept: PODIATRY | Facility: CLINIC | Age: 64
End: 2024-08-14

## 2024-08-14 DIAGNOSIS — Q66.80 CONGENITAL VERTICAL TALUS DEFORMITY, UNSPECIFIED FOOT: ICD-10-CM

## 2024-08-14 DIAGNOSIS — L97.821 NON-PRESSURE CHRONIC ULCER OF OTHER PART OF LEFT LOWER LEG LIMITED TO BREAKDOWN OF SKIN: ICD-10-CM

## 2024-08-14 DIAGNOSIS — M79.89 OTHER SPECIFIED SOFT TISSUE DISORDERS: ICD-10-CM

## 2024-08-14 DIAGNOSIS — L97.811 NON-PRESSURE CHRONIC ULCER OF OTHER PART OF RIGHT LOWER LEG LIMITED TO BREAKDOWN OF SKIN: ICD-10-CM

## 2024-08-14 DIAGNOSIS — E11.42 TYPE 2 DIABETES MELLITUS WITH DIABETIC POLYNEUROPATHY: ICD-10-CM

## 2024-08-14 DIAGNOSIS — Z89.429 ACQUIRED ABSENCE OF OTHER TOE(S), UNSPECIFIED SIDE: ICD-10-CM

## 2024-08-14 DIAGNOSIS — M14.60 CHARCOT'S JOINT, UNSPECIFIED SITE: ICD-10-CM

## 2024-08-14 PROCEDURE — 29580 STRAPPING UNNA BOOT: CPT | Mod: 50

## 2024-08-20 NOTE — PHYSICAL EXAM
[Skin Lesions] : no skin lesions [Foot Ulcer] : foot ulcer [de-identified] : Rocker bottom LLE  L foot amp 5th toe  [FreeTextEntry1] : superficial wounds B/L LE

## 2024-08-20 NOTE — PROCEDURE
[] : A well-padded Unna Boot was applied to both lower extremities. [FreeTextEntry1] : dressings removed, legs cleaned. legs moisturized with a and d ointment. compression dressings bilaterally of unna boots,coban and stockinettes. pt to return in 2 weeks.

## 2024-08-20 NOTE — PHYSICAL EXAM
[Skin Lesions] : no skin lesions [Foot Ulcer] : foot ulcer [de-identified] : Rocker bottom LLE  L foot amp 5th toe  [FreeTextEntry1] : superficial wounds B/L LE

## 2024-08-28 ENCOUNTER — OUTPATIENT (OUTPATIENT)
Dept: OUTPATIENT SERVICES | Facility: HOSPITAL | Age: 64
LOS: 1 days | End: 2024-08-28
Payer: MEDICARE

## 2024-08-28 ENCOUNTER — APPOINTMENT (OUTPATIENT)
Dept: PODIATRY | Facility: CLINIC | Age: 64
End: 2024-08-28
Payer: MEDICARE

## 2024-08-28 DIAGNOSIS — Z00.00 ENCOUNTER FOR GENERAL ADULT MEDICAL EXAMINATION WITHOUT ABNORMAL FINDINGS: ICD-10-CM

## 2024-08-28 DIAGNOSIS — M79.89 OTHER SPECIFIED SOFT TISSUE DISORDERS: ICD-10-CM

## 2024-08-28 DIAGNOSIS — L97.821 NON-PRESSURE CHRONIC ULCER OF OTHER PART OF LEFT LOWER LEG LIMITED TO BREAKDOWN OF SKIN: ICD-10-CM

## 2024-08-28 DIAGNOSIS — L97.811 NON-PRESSURE CHRONIC ULCER OF OTHER PART OF RIGHT LOWER LEG LIMITED TO BREAKDOWN OF SKIN: ICD-10-CM

## 2024-08-28 DIAGNOSIS — Z98.890 OTHER SPECIFIED POSTPROCEDURAL STATES: Chronic | ICD-10-CM

## 2024-08-28 DIAGNOSIS — M14.60 CHARCOT'S JOINT, UNSPECIFIED SITE: ICD-10-CM

## 2024-08-28 DIAGNOSIS — Z89.429 ACQUIRED ABSENCE OF OTHER TOE(S), UNSPECIFIED SIDE: ICD-10-CM

## 2024-08-28 DIAGNOSIS — Q66.80 CONGENITAL VERTICAL TALUS DEFORMITY, UNSPECIFIED FOOT: ICD-10-CM

## 2024-08-28 DIAGNOSIS — Z95.1 PRESENCE OF AORTOCORONARY BYPASS GRAFT: Chronic | ICD-10-CM

## 2024-08-28 DIAGNOSIS — Z95.828 PRESENCE OF OTHER VASCULAR IMPLANTS AND GRAFTS: Chronic | ICD-10-CM

## 2024-08-28 PROCEDURE — 29580 STRAPPING UNNA BOOT: CPT | Mod: 50

## 2024-08-28 NOTE — PRE-ANESTHESIA EVALUATION ADULT - NSANTHBPHIGHRD_ENT_A_CORE
August 28, 2024    Ahmet Gonzalez  [address] [facility] [address]             Virtual Visit - Urgent Care  Urgent Care  [address] [facility] [address]   August 28, 2024     Patient: Ahmet Gonzalez   YOB: 1977   Date of Visit: 8/28/2024       To Whom it May Concern:    Ahmet Gonzalez was seen virtually on 8/28/2024. She may return to work on 8/29/24 .    Please excuse her from any classes or work missed.    If you have any questions or concerns, please don't hesitate to call.    Sincerely,         Liz Sommer MD     
Yes

## 2024-08-29 NOTE — PHYSICAL EXAM
[Skin Lesions] : no skin lesions [de-identified] : Rocker bottom LLE  L foot amp 5th toe  [Foot Ulcer] : foot ulcer [FreeTextEntry1] : superficial wounds B/L LE

## 2024-08-29 NOTE — PHYSICAL EXAM
[Skin Lesions] : no skin lesions [de-identified] : Rocker bottom LLE  L foot amp 5th toe  [Foot Ulcer] : foot ulcer [FreeTextEntry1] : superficial wounds B/L LE

## 2024-08-29 NOTE — PROCEDURE
[] : A well-padded Unna Boot was applied to both lower extremities. [FreeTextEntry1] : dressings removed. legs cleaned. dressed with a and d ointment. cushioning to left heel, bilateral tibial crests. unna boots,acoban and stockinettes applied bilaterally. pt to return in 2 weeks.

## 2024-09-16 ENCOUNTER — EMERGENCY (EMERGENCY)
Facility: HOSPITAL | Age: 64
LOS: 0 days | Discharge: HOME | End: 2024-09-16
Attending: EMERGENCY MEDICINE
Payer: MEDICARE

## 2024-09-16 VITALS
RESPIRATION RATE: 18 BRPM | DIASTOLIC BLOOD PRESSURE: 72 MMHG | HEART RATE: 73 BPM | SYSTOLIC BLOOD PRESSURE: 129 MMHG | TEMPERATURE: 98 F | OXYGEN SATURATION: 100 %

## 2024-09-16 DIAGNOSIS — Z99.89 DEPENDENCE ON OTHER ENABLING MACHINES AND DEVICES: ICD-10-CM

## 2024-09-16 DIAGNOSIS — R11.2 NAUSEA WITH VOMITING, UNSPECIFIED: ICD-10-CM

## 2024-09-16 DIAGNOSIS — I77.0 ARTERIOVENOUS FISTULA, ACQUIRED: Chronic | ICD-10-CM

## 2024-09-16 DIAGNOSIS — R18.8 OTHER ASCITES: ICD-10-CM

## 2024-09-16 DIAGNOSIS — Z98.890 OTHER SPECIFIED POSTPROCEDURAL STATES: Chronic | ICD-10-CM

## 2024-09-16 LAB
ALBUMIN SERPL ELPH-MCNC: 3.1 G/DL — LOW (ref 3.5–5.2)
ALP SERPL-CCNC: 152 U/L — HIGH (ref 30–115)
ALT FLD-CCNC: <5 U/L — SIGNIFICANT CHANGE UP (ref 0–41)
ANION GAP SERPL CALC-SCNC: 11 MMOL/L — SIGNIFICANT CHANGE UP (ref 7–14)
APTT BLD: 32.1 SEC — SIGNIFICANT CHANGE UP (ref 27–39.2)
AST SERPL-CCNC: 12 U/L — SIGNIFICANT CHANGE UP (ref 0–41)
BASOPHILS # BLD AUTO: 0 K/UL — SIGNIFICANT CHANGE UP (ref 0–0.2)
BASOPHILS NFR BLD AUTO: 0 % — SIGNIFICANT CHANGE UP (ref 0–1)
BILIRUB DIRECT SERPL-MCNC: <0.2 MG/DL — SIGNIFICANT CHANGE UP (ref 0–0.3)
BILIRUB INDIRECT FLD-MCNC: >0.4 MG/DL — SIGNIFICANT CHANGE UP (ref 0.2–1.2)
BILIRUB SERPL-MCNC: 0.6 MG/DL — SIGNIFICANT CHANGE UP (ref 0.2–1.2)
BUN SERPL-MCNC: 19 MG/DL — SIGNIFICANT CHANGE UP (ref 10–20)
CALCIUM SERPL-MCNC: 7.8 MG/DL — LOW (ref 8.4–10.5)
CHLORIDE SERPL-SCNC: 95 MMOL/L — LOW (ref 98–110)
CO2 SERPL-SCNC: 29 MMOL/L — SIGNIFICANT CHANGE UP (ref 17–32)
CREAT SERPL-MCNC: 3.4 MG/DL — HIGH (ref 0.7–1.5)
EGFR: 19 ML/MIN/1.73M2 — LOW
EOSINOPHIL # BLD AUTO: 0.17 K/UL — SIGNIFICANT CHANGE UP (ref 0–0.7)
EOSINOPHIL NFR BLD AUTO: 4.4 % — SIGNIFICANT CHANGE UP (ref 0–8)
GLUCOSE SERPL-MCNC: 140 MG/DL — HIGH (ref 70–99)
HCT VFR BLD CALC: 28 % — LOW (ref 42–52)
HGB BLD-MCNC: 8.9 G/DL — LOW (ref 14–18)
INR BLD: 1.21 RATIO — SIGNIFICANT CHANGE UP (ref 0.65–1.3)
LIDOCAIN IGE QN: 21 U/L — SIGNIFICANT CHANGE UP (ref 7–60)
LYMPHOCYTES # BLD AUTO: 0.14 K/UL — LOW (ref 1.2–3.4)
LYMPHOCYTES # BLD AUTO: 3.5 % — LOW (ref 20.5–51.1)
MCHC RBC-ENTMCNC: 26.8 PG — LOW (ref 27–31)
MCHC RBC-ENTMCNC: 31.8 G/DL — LOW (ref 32–37)
MCV RBC AUTO: 84.3 FL — SIGNIFICANT CHANGE UP (ref 80–94)
MONOCYTES # BLD AUTO: 0.31 K/UL — SIGNIFICANT CHANGE UP (ref 0.1–0.6)
MONOCYTES NFR BLD AUTO: 8 % — SIGNIFICANT CHANGE UP (ref 1.7–9.3)
NEUTROPHILS # BLD AUTO: 3.24 K/UL — SIGNIFICANT CHANGE UP (ref 1.4–6.5)
NEUTROPHILS NFR BLD AUTO: 83.2 % — HIGH (ref 42.2–75.2)
PLATELET # BLD AUTO: 159 K/UL — SIGNIFICANT CHANGE UP (ref 130–400)
PMV BLD: 11 FL — HIGH (ref 7.4–10.4)
POTASSIUM SERPL-MCNC: 5.4 MMOL/L — HIGH (ref 3.5–5)
POTASSIUM SERPL-SCNC: 5.4 MMOL/L — HIGH (ref 3.5–5)
PROT SERPL-MCNC: 7.1 G/DL — SIGNIFICANT CHANGE UP (ref 6–8)
PROTHROM AB SERPL-ACNC: 13.8 SEC — HIGH (ref 9.95–12.87)
RBC # BLD: 3.32 M/UL — LOW (ref 4.7–6.1)
RBC # FLD: 17.1 % — HIGH (ref 11.5–14.5)
SODIUM SERPL-SCNC: 135 MMOL/L — SIGNIFICANT CHANGE UP (ref 135–146)
WBC # BLD: 3.89 K/UL — LOW (ref 4.8–10.8)
WBC # FLD AUTO: 3.89 K/UL — LOW (ref 4.8–10.8)

## 2024-09-16 PROCEDURE — 80076 HEPATIC FUNCTION PANEL: CPT

## 2024-09-16 PROCEDURE — 74177 CT ABD & PELVIS W/CONTRAST: CPT | Mod: MC

## 2024-09-16 PROCEDURE — 99284 EMERGENCY DEPT VISIT MOD MDM: CPT | Mod: 25

## 2024-09-16 PROCEDURE — 85730 THROMBOPLASTIN TIME PARTIAL: CPT

## 2024-09-16 PROCEDURE — 99285 EMERGENCY DEPT VISIT HI MDM: CPT | Mod: FS

## 2024-09-16 PROCEDURE — 36415 COLL VENOUS BLD VENIPUNCTURE: CPT

## 2024-09-16 PROCEDURE — 96374 THER/PROPH/DIAG INJ IV PUSH: CPT | Mod: XU

## 2024-09-16 PROCEDURE — 80048 BASIC METABOLIC PNL TOTAL CA: CPT

## 2024-09-16 PROCEDURE — 74177 CT ABD & PELVIS W/CONTRAST: CPT | Mod: 26,MC

## 2024-09-16 PROCEDURE — 85610 PROTHROMBIN TIME: CPT

## 2024-09-16 PROCEDURE — 85025 COMPLETE CBC W/AUTO DIFF WBC: CPT

## 2024-09-16 PROCEDURE — 83690 ASSAY OF LIPASE: CPT

## 2024-09-16 RX ORDER — IOHEXOL 350 MG/ML
30 INJECTION, SOLUTION INTRAVENOUS ONCE
Refills: 0 | Status: COMPLETED | OUTPATIENT
Start: 2024-09-16 | End: 2024-09-16

## 2024-09-16 RX ORDER — ONDANSETRON 2 MG/ML
4 INJECTION, SOLUTION INTRAMUSCULAR; INTRAVENOUS ONCE
Refills: 0 | Status: COMPLETED | OUTPATIENT
Start: 2024-09-16 | End: 2024-09-16

## 2024-09-16 RX ADMIN — IOHEXOL 30 MILLILITER(S): 350 INJECTION, SOLUTION INTRAVENOUS at 16:52

## 2024-09-16 RX ADMIN — ONDANSETRON 4 MILLIGRAM(S): 2 INJECTION, SOLUTION INTRAMUSCULAR; INTRAVENOUS at 16:52

## 2024-09-16 NOTE — ED PROVIDER NOTE - NSICDXPASTMEDICALHX_GEN_ALL_CORE_FT
PAST MEDICAL HISTORY:  BPH (benign prostatic hyperplasia)     Chronic anemia     Chronic kidney disease (CKD) Stage IV    Diabetes mellitus     History of medical problems left arm precaution    HLD (hyperlipidemia)     [place] (hard of hearing)     Hypertension     Myocardial infarction 2012    OA (osteoarthritis)     PAD (peripheral artery disease) S/p bypass left leg    Pain in left knee s/p fall    S/P CABG (coronary artery bypass graft) x4    Stented coronary artery in 2008    Transient ischemic attack (TIA) 2017; 2008

## 2024-09-16 NOTE — ED ADULT TRIAGE NOTE - NS ED TRIAGE AVPU SCALE
After Visit Summary   8/28/2018    Piper Lisa    MRN: [id number]           Patient Information     Date Of Birth          1967        Visit Information        Provider Department      8/28/2018 4:40 PM Maribel Serna MD [facility]        Today's Diagnoses     Routine general medical examination at a health care facility    -  1    Generalized anxiety disorder          Care Instructions      Preventive Health Recommendations  Female Ages 50 - 64    Yearly exam: See your health care provider every year in order to  o Review health changes.   o Discuss preventive care.    o Review your medicines if your doctor has prescribed any.      Get a Pap test every three years (unless you have an abnormal result and your provider advises testing more often).    If you get Pap tests with HPV test, you only need to test every 5 years, unless you have an abnormal result.     You do not need a Pap test if your uterus was removed (hysterectomy) and you have not had cancer.    You should be tested each year for STDs (sexually transmitted diseases) if you're at risk.     Have a mammogram every 1 to 2 years.    Have a colonoscopy at age 50, or have a yearly FIT test (stool test). These exams screen for colon cancer.      Have a cholesterol test every 5 years, or more often if advised.    Have a diabetes test (fasting glucose) every three years. If you are at risk for diabetes, you should have this test more often.     If you are at risk for osteoporosis (brittle bone disease), think about having a bone density scan (DEXA).    Shots: Get a flu shot each year. Get a tetanus shot every 10 years.    Nutrition:     Eat at least 5 servings of fruits and vegetables each day.    Eat whole-grain bread, whole-wheat pasta and brown rice instead of white grains and rice.    Get adequate Calcium and Vitamin D.     Lifestyle    Exercise at least 150 minutes a week (30 minutes a day, 5 days a week). This will  help you control your weight and prevent disease.    Limit alcohol to one drink per day.    No smoking.     Wear sunscreen to prevent skin cancer.     See your dentist every six months for an exam and cleaning.    See your eye doctor every 1 to 2 years.            Follow-ups after your visit        Additional Services     DERMATOLOGY REFERRAL       Your provider has referred you to: [facility] [phone]   [email or web address]    Please be aware that coverage of these services is subject to the terms and limitations of your health insurance plan.  Call member services at your health plan with any benefit or coverage questions.      Please bring the following with you to your appointment:    (1) Any X-Rays, CTs or MRIs which have been performed.  Contact the facility where they were done to arrange for  prior to your scheduled appointment.    (2) List of current medications  (3) This referral request   (4) Any documents/labs given to you for this referral                  Follow-up notes from your care team     Return in about 1 year (around 8/28/2019) for Annual Physical Exam.      Your next 10 appointments already scheduled     Oct 05, 2018 10:15 AM CDT   Screening Mammogram with [address] [facility] [place] ([facility])    [address], [address]  [facility] [address] [phone]           Do NOT use body powder, lotions, perfume or deodorant the day of the exam.  If your last mammogram was not done at [facility], please bring your mammogram films. We will need the name of your provider to send a copy of your report.  A mammogram may be covered on an annual or biannual basis, please check with your insurance company.            Jan 18, 2019  1:00 PM CST   Return Visit with Fabiola Kate MD   [facility] ([facility])    [facility]  [address]  "[phone] [phone]              Who to contact     If you have questions or need follow up information about today's clinic visit or your schedule please contact [facility] directly at [phone].  Normal or non-critical lab and imaging results will be communicated to you by MyChart, letter or phone within 4 business days after the clinic has received the results. If you do not hear from us within 7 days, please contact the clinic through [employer] or phone. If you have a critical or abnormal lab result, we will notify you by phone as soon as possible.  Submit refill requests through [employer] or call your pharmacy and they will forward the refill request to us. Please allow 3 business days for your refill to be completed.          Additional Information About Your Visit        [employer] Information     [employer] gives you secure access to your electronic health record. If you see a primary care provider, you can also send messages to your care team and make appointments. If you have questions, please call your primary care clinic.  If you do not have a primary care provider, please call [phone] and they will assist you.        Care EveryWhere ID     This is your Care EveryWhere ID. This could be used by other organizations to access your [facility] medical records  [id number]        Your Vitals Were     Pulse Temperature Height Last Period Pulse Oximetry BMI (Body Mass Index)    88 98.6  F (37  C) (Tympanic) 5' 3.98\" (1.625 m) 07/15/2018 98% 26.45 kg/m2       Blood Pressure from Last 3 Encounters:   08/28/18 112/64   07/10/18 112/74   01/12/18 111/73    Weight from Last 3 Encounters:   08/28/18 154 lb (69.9 kg)   07/10/18 156 lb 3.2 oz (70.9 kg)   01/12/18 158 lb (71.7 kg)              We Performed the Following     CBC with platelets     Comprehensive metabolic panel     DERMATOLOGY REFERRAL          Today's Medication Changes          These changes are accurate as of 8/28/18  5:10 PM.  If " you have any questions, ask your nurse or doctor.               These medicines have changed or have updated prescriptions.        Dose/Directions    escitalopram 5 MG tablet   Commonly known as:  LEXAPRO   This may have changed:    - medication strength  - how much to take   Used for:  Generalized anxiety disorder   Changed by:  Maribel Serna MD        Dose:  5 mg   Take 1 tablet (5 mg) by mouth daily   Quantity:  30 tablet   Refills:  1            Where to get your medicines      These medications were sent to [facility] [place], [place] - [address]  [address], [place] [address]     Phone:  [phone]     escitalopram 5 MG tablet                Primary Care Provider Office Phone # Fax #    Mairbel Serna [phone]       [address] [place] [address] [address]  [place] [address]        Equal Access to Services     [facility]: Hadii pelon ku hadasho Soomaali, waaxda luqadaha, qaybta kaalmada adeegyada, waxay idiin haykingsley ricks . So [facility] [phone].    ATENCIÓN: Si habla español, tiene a salazar disposición servicios gratuitos de asistencia lingüística. Llame al [phone].    We comply with applicable federal civil rights laws and [place] laws. We do not discriminate on the basis of race, color, national origin, age, disability, sex, sexual orientation, or gender identity.            Thank you!     Thank you for choosing [facility] [place]  for your care. Our goal is always to provide you with excellent care. Hearing back from our patients is one way we can continue to improve our services. Please take a few minutes to complete the written survey that you may receive in the mail after your visit with us. Thank you!             Your Updated Medication List - Protect others around you: Learn how to safely use, store and throw away your medicines at www.disposemymeds.org.          This list is accurate as of 8/28/18  5:10 PM.   Always use your most recent med list.                   Brand Name Dispense Instructions for use Diagnosis    CALCIUM + D PO           escitalopram 5 MG tablet    LEXAPRO    30 tablet    Take 1 tablet (5 mg) by mouth daily    Generalized anxiety disorder       Multi-vitamin Tabs tablet      Take 1 tablet by mouth daily        SUMAtriptan 50 MG tablet    IMITREX    10 tablet    Take 1 tablet (50 mg) by mouth at onset of headache for migraine May repeat dose in 2 hours if needed, max daily dose is 200 mg    Migraine without aura and without status migrainosus, not intractable       tamoxifen 20 MG tablet    NOLVADEX    90 tablet    Take 1 tablet (20 mg) by mouth daily    Malignant neoplasm of upper-inner quadrant of right breast in female, estrogen receptor positive (H)          Alert-The patient is alert, awake and responds to voice. The patient is oriented to time, place, and person. The triage nurse is able to obtain subjective information.

## 2024-09-16 NOTE — ED PROVIDER NOTE - PROGRESS NOTE DETAILS
KA - Right gluteal muscle examined with overlying ecchymosis nontender patient typically ambulates with walker and has had no change in ambulation status and denies any current pain to the right hip.  He noted a fall 1 week ago but denies any other complaints.  Will DC back to [facility].  No acute or emergent concerns.

## 2024-09-16 NOTE — ED ADULT TRIAGE NOTE - CHIEF COMPLAINT QUOTE
Pt BIBEMS from [place] for distended abdomen for one week with minimal bowel movements/ decreased appetite and reports nausea and vomiting

## 2024-09-16 NOTE — ED PROVIDER NOTE - OBJECTIVE STATEMENT
64-year-old male, past medical history as documented, presenting with 1 week of diffuse abdominal pain and distention as well as decreased appetite and constipation.  Patient also with several episodes of nausea and vomiting.  Otherwise denies fevers, diarrhea, blood in stool, urinary symptoms or any other complaints.

## 2024-09-16 NOTE — ED ADULT NURSE NOTE - NSICDXPASTMEDICALHX_GEN_ALL_CORE_FT
PAST MEDICAL HISTORY:  BPH (benign prostatic hyperplasia)     Chronic anemia     Chronic kidney disease (CKD) Stage IV    Diabetes mellitus     History of medical problems left arm precaution    HLD (hyperlipidemia)     [place] (hard of hearing)     Hypertension     Myocardial infarction 2012    OA (osteoarthritis)     PAD (peripheral artery disease) S/p bypass left leg    Pain in left knee s/p fall    S/P CABG (coronary artery bypass graft) x4    Stented coronary artery in 2008    Transient ischemic attack (TIA) 2017; 2008

## 2024-09-16 NOTE — ED PROVIDER NOTE - NSFOLLOWUPINSTRUCTIONS_ED_ALL_ED_FT
Follow-up with your primary care doctor.    Ascites    Ascites is a collection of excess fluid in the abdomen. Ascites can range from mild to severe. It can get worse without treatment.     CAUSES  Possible causes include:    Cirrhosis. This is the most common cause of ascites.  Infection or inflammation in the abdomen.  Cancer in the abdomen.  Heart failure.  Kidney disease.  Inflammation of the pancreas.  Clots in the veins of the liver.    SIGNS AND SYMPTOMS  Signs and symptoms may include:    A feeling of fullness in your abdomen. This is common.  An increase in the size of your abdomen or your waist.  Swelling in your legs.  Swelling of the scrotum in men.  Difficulty breathing.  Abdominal pain.  Sudden weight gain.    If the condition is mild, you may not have symptoms.    DIAGNOSIS  To make a diagnosis, your health care provider will:    Ask about your medical history.  Perform a physical exam.   Order imaging tests, such as an ultrasound or CT scan of your abdomen.    TREATMENT  Treatment depends on the cause of the ascites. It may include:    Taking a pill to make you urinate. This is called a water pill (diuretic pill).  Strictly reducing your salt (sodium) intake. Salt can cause extra fluid to be kept in the body, and this makes ascites worse.  Having a procedure to remove fluid from your abdomen (paracentesis).  Having a procedure to transfer fluid from your abdomen into a vein.  Having a procedure that connects two of the major veins within your liver and relieves pressure on your liver (TIPS procedure).     Ascites may go away or improve with treatment of the condition that caused it.     HOME CARE INSTRUCTIONS  Keep track of your weight. To do this, weigh yourself at the same time every day and record your weight.  Keep track of how much you drink and any changes in the amount you urinate.  Follow any instructions that your health care provider gives you about how much to drink.  Try not to eat salty (high-sodium) foods.  Take medicines only as directed by your health care provider.  Keep all follow-up visits as directed by your health care provider. This is important.  Report any changes in your health to your health care provider, especially if you develop new symptoms or your symptoms get worse.    SEEK MEDICAL CARE IF:  Your gain more than 3 pounds in 3 days.  Your abdominal size or your waist size increases.  You have new swelling in your legs.  The swelling in your legs gets worse.    SEEK IMMEDIATE MEDICAL CARE IF:  You develop a fever.  You develop confusion.  You develop new or worsening difficulty breathing.  You develop new or worsening abdominal pain.  You develop new or worsening swelling in the scrotum (in men).    ADDITIONAL NOTES AND INSTRUCTIONS    Please follow up with your Primary MD in 24-48 hr.  Seek immediate medical care for any new/worsening signs or symptoms.

## 2024-09-16 NOTE — ED PROVIDER NOTE - PHYSICAL EXAMINATION
Vital Signs: I have reviewed the initial vital signs.  Constitutional: NAD, well-nourished, appears stated age, no acute distress.  HEENT: Airway patent, moist MM, no erythema/swelling/deformity of oral structures. EOMI, PERRLA.  CV: regular rate, regular rhythm, well-perfused extremities, 2+ b/l DP and radial pulses equal.  Lungs: BCTA, no increased WOB.  ABD: (+) diffuse tenderness and distention, no guarding or rebound, no pulsatile mass, no hernias.   MSK: Neck supple, nontender, nl ROM, no stepoff. Chest nontender. Back nontender in TLS spine or to b/l bony structures or flanks. Ext nontender, nl rom, no deformity.   INTEG: Skin warm, dry, no rash.  NEURO: A&Ox3, normal strength, nl sensation throughout, normal speech.   PSYCH: Calm, cooperative, normal affect and interaction.

## 2024-09-16 NOTE — ED PROVIDER NOTE - PATIENT PORTAL LINK FT
You can access the FollowMyHealth Patient Portal offered by [facility] by registering at the following website: [email or web address]. By joining [facility]’s FollowMyHealth portal, you will also be able to view your health information using other applications (apps) compatible with our system.

## 2024-09-16 NOTE — ED ADULT NURSE NOTE - CHIEF COMPLAINT QUOTE
Pt BIBEMS from [place] for distended abdomen for one week with minimal bowel movements/ decreased appetite and reports nausea and vomiting

## 2024-09-16 NOTE — ED PROVIDER NOTE - CLINICAL SUMMARY MEDICAL DECISION MAKING FREE TEXT BOX
Patient presented with 1 week of diffuse abdominal pain and distention as documented. (+) tender on exam but otherwise afebrile, HD stable, well appearing. Obtained labs which were grossly unremarkable including no significant leukocytosis, anemia, signs of dehydration/JOHN, transaminitis or significant electrolyte abnormalities. CT abd/pelvis negative for emergent processes but showed incidental findings as above. Patient felt much better after tx in ED, and serial abdominal exams benign. Able to tolerate PO. Given the above, will discharge with outpatient follow up. Patient agreeable with plan. Agrees to return to ED for any new or worsening symptoms.

## 2024-09-16 NOTE — ED ADULT TRIAGE NOTE - HEART RATE (BEATS/MIN)
73
no breast lump R/no nipple discharge L/no nipple discharge R/no breast tenderness L/no breast lump L/no breast tenderness R

## 2024-09-23 ENCOUNTER — INPATIENT (INPATIENT)
Facility: HOSPITAL | Age: 64
LOS: 13 days | Discharge: HOME | DRG: 291 | End: 2024-10-07
Attending: INTERNAL MEDICINE | Admitting: INTERNAL MEDICINE
Payer: MEDICARE

## 2024-09-23 VITALS
SYSTOLIC BLOOD PRESSURE: 122 MMHG | WEIGHT: 205.91 LBS | HEIGHT: 71 IN | OXYGEN SATURATION: 95 % | DIASTOLIC BLOOD PRESSURE: 61 MMHG | TEMPERATURE: 98 F | RESPIRATION RATE: 18 BRPM | HEART RATE: 68 BPM

## 2024-09-23 DIAGNOSIS — Z95.828 PRESENCE OF OTHER VASCULAR IMPLANTS AND GRAFTS: Chronic | ICD-10-CM

## 2024-09-23 DIAGNOSIS — Z98.890 OTHER SPECIFIED POSTPROCEDURAL STATES: Chronic | ICD-10-CM

## 2024-09-23 DIAGNOSIS — R10.9 UNSPECIFIED ABDOMINAL PAIN: ICD-10-CM

## 2024-09-23 DIAGNOSIS — I77.0 ARTERIOVENOUS FISTULA, ACQUIRED: Chronic | ICD-10-CM

## 2024-09-23 DIAGNOSIS — Z95.1 PRESENCE OF AORTOCORONARY BYPASS GRAFT: Chronic | ICD-10-CM

## 2024-09-23 LAB
ALBUMIN SERPL ELPH-MCNC: 3.1 G/DL — LOW (ref 3.5–5.2)
ALP SERPL-CCNC: 167 U/L — HIGH (ref 30–115)
ALT FLD-CCNC: <5 U/L — SIGNIFICANT CHANGE UP (ref 0–41)
ANION GAP SERPL CALC-SCNC: 16 MMOL/L — HIGH (ref 7–14)
AST SERPL-CCNC: 6 U/L — SIGNIFICANT CHANGE UP (ref 0–41)
BASOPHILS # BLD AUTO: 0.04 K/UL — SIGNIFICANT CHANGE UP (ref 0–0.2)
BASOPHILS NFR BLD AUTO: 0.8 % — SIGNIFICANT CHANGE UP (ref 0–1)
BILIRUB SERPL-MCNC: 0.6 MG/DL — SIGNIFICANT CHANGE UP (ref 0.2–1.2)
BUN SERPL-MCNC: 31 MG/DL — HIGH (ref 10–20)
CALCIUM SERPL-MCNC: 7.9 MG/DL — LOW (ref 8.4–10.5)
CHLORIDE SERPL-SCNC: 93 MMOL/L — LOW (ref 98–110)
CO2 SERPL-SCNC: 26 MMOL/L — SIGNIFICANT CHANGE UP (ref 17–32)
CREAT SERPL-MCNC: 5.1 MG/DL — CRITICAL HIGH (ref 0.7–1.5)
EGFR: 12 ML/MIN/1.73M2 — LOW
EOSINOPHIL # BLD AUTO: 0.19 K/UL — SIGNIFICANT CHANGE UP (ref 0–0.7)
EOSINOPHIL NFR BLD AUTO: 3.9 % — SIGNIFICANT CHANGE UP (ref 0–8)
GLUCOSE BLDC GLUCOMTR-MCNC: 105 MG/DL — HIGH (ref 70–99)
GLUCOSE BLDC GLUCOMTR-MCNC: 117 MG/DL — HIGH (ref 70–99)
GLUCOSE SERPL-MCNC: 102 MG/DL — HIGH (ref 70–99)
HCT VFR BLD CALC: 29.8 % — LOW (ref 42–52)
HGB BLD-MCNC: 9.3 G/DL — LOW (ref 14–18)
IMM GRANULOCYTES NFR BLD AUTO: 0.2 % — SIGNIFICANT CHANGE UP (ref 0.1–0.3)
LIDOCAIN IGE QN: 20 U/L — SIGNIFICANT CHANGE UP (ref 7–60)
LYMPHOCYTES # BLD AUTO: 0.6 K/UL — LOW (ref 1.2–3.4)
LYMPHOCYTES # BLD AUTO: 12.2 % — LOW (ref 20.5–51.1)
MCHC RBC-ENTMCNC: 26.2 PG — LOW (ref 27–31)
MCHC RBC-ENTMCNC: 31.2 G/DL — LOW (ref 32–37)
MCV RBC AUTO: 83.9 FL — SIGNIFICANT CHANGE UP (ref 80–94)
MONOCYTES # BLD AUTO: 0.6 K/UL — SIGNIFICANT CHANGE UP (ref 0.1–0.6)
MONOCYTES NFR BLD AUTO: 12.2 % — HIGH (ref 1.7–9.3)
NEUTROPHILS # BLD AUTO: 3.49 K/UL — SIGNIFICANT CHANGE UP (ref 1.4–6.5)
NEUTROPHILS NFR BLD AUTO: 70.7 % — SIGNIFICANT CHANGE UP (ref 42.2–75.2)
NRBC # BLD: 0 /100 WBCS — SIGNIFICANT CHANGE UP (ref 0–0)
NT-PROBNP SERPL-SCNC: HIGH PG/ML (ref 0–300)
PLATELET # BLD AUTO: 191 K/UL — SIGNIFICANT CHANGE UP (ref 130–400)
PMV BLD: 10.8 FL — HIGH (ref 7.4–10.4)
POTASSIUM SERPL-MCNC: 4.1 MMOL/L — SIGNIFICANT CHANGE UP (ref 3.5–5)
POTASSIUM SERPL-SCNC: 4.1 MMOL/L — SIGNIFICANT CHANGE UP (ref 3.5–5)
PROT SERPL-MCNC: 7.1 G/DL — SIGNIFICANT CHANGE UP (ref 6–8)
RBC # BLD: 3.55 M/UL — LOW (ref 4.7–6.1)
RBC # FLD: 17.2 % — HIGH (ref 11.5–14.5)
SODIUM SERPL-SCNC: 135 MMOL/L — SIGNIFICANT CHANGE UP (ref 135–146)
WBC # BLD: 4.93 K/UL — SIGNIFICANT CHANGE UP (ref 4.8–10.8)
WBC # FLD AUTO: 4.93 K/UL — SIGNIFICANT CHANGE UP (ref 4.8–10.8)

## 2024-09-23 PROCEDURE — 82310 ASSAY OF CALCIUM: CPT

## 2024-09-23 PROCEDURE — 88305 TISSUE EXAM BY PATHOLOGIST: CPT

## 2024-09-23 PROCEDURE — 78264 GASTRIC EMPTYING IMG STUDY: CPT | Mod: MC

## 2024-09-23 PROCEDURE — 85025 COMPLETE CBC W/AUTO DIFF WBC: CPT

## 2024-09-23 PROCEDURE — 83540 ASSAY OF IRON: CPT

## 2024-09-23 PROCEDURE — 87640 STAPH A DNA AMP PROBE: CPT

## 2024-09-23 PROCEDURE — 82728 ASSAY OF FERRITIN: CPT

## 2024-09-23 PROCEDURE — 87102 FUNGUS ISOLATION CULTURE: CPT

## 2024-09-23 PROCEDURE — 83735 ASSAY OF MAGNESIUM: CPT

## 2024-09-23 PROCEDURE — 82306 VITAMIN D 25 HYDROXY: CPT

## 2024-09-23 PROCEDURE — 87075 CULTR BACTERIA EXCEPT BLOOD: CPT

## 2024-09-23 PROCEDURE — 87641 MR-STAPH DNA AMP PROBE: CPT

## 2024-09-23 PROCEDURE — 84466 ASSAY OF TRANSFERRIN: CPT

## 2024-09-23 PROCEDURE — 87015 SPECIMEN INFECT AGNT CONCNTJ: CPT

## 2024-09-23 PROCEDURE — 93005 ELECTROCARDIOGRAM TRACING: CPT

## 2024-09-23 PROCEDURE — 82042 OTHER SOURCE ALBUMIN QUAN EA: CPT

## 2024-09-23 PROCEDURE — 97162 PT EVAL MOD COMPLEX 30 MIN: CPT | Mod: GP

## 2024-09-23 PROCEDURE — 90935 HEMODIALYSIS ONE EVALUATION: CPT

## 2024-09-23 PROCEDURE — 86901 BLOOD TYPING SEROLOGIC RH(D): CPT

## 2024-09-23 PROCEDURE — 82962 GLUCOSE BLOOD TEST: CPT

## 2024-09-23 PROCEDURE — 87070 CULTURE OTHR SPECIMN AEROBIC: CPT

## 2024-09-23 PROCEDURE — 82945 GLUCOSE OTHER FLUID: CPT

## 2024-09-23 PROCEDURE — P9047: CPT

## 2024-09-23 PROCEDURE — 74021 RADEX ABDOMEN 3+ VIEWS: CPT

## 2024-09-23 PROCEDURE — 93307 TTE W/O DOPPLER COMPLETE: CPT

## 2024-09-23 PROCEDURE — 83036 HEMOGLOBIN GLYCOSYLATED A1C: CPT

## 2024-09-23 PROCEDURE — 87205 SMEAR GRAM STAIN: CPT

## 2024-09-23 PROCEDURE — 84157 ASSAY OF PROTEIN OTHER: CPT

## 2024-09-23 PROCEDURE — 36415 COLL VENOUS BLD VENIPUNCTURE: CPT

## 2024-09-23 PROCEDURE — 82977 ASSAY OF GGT: CPT

## 2024-09-23 PROCEDURE — 97110 THERAPEUTIC EXERCISES: CPT | Mod: GP

## 2024-09-23 PROCEDURE — A9541: CPT

## 2024-09-23 PROCEDURE — 93308 TTE F-UP OR LMTD: CPT

## 2024-09-23 PROCEDURE — 80053 COMPREHEN METABOLIC PANEL: CPT

## 2024-09-23 PROCEDURE — 97530 THERAPEUTIC ACTIVITIES: CPT | Mod: GP

## 2024-09-23 PROCEDURE — 88112 CYTOPATH CELL ENHANCE TECH: CPT

## 2024-09-23 PROCEDURE — 99285 EMERGENCY DEPT VISIT HI MDM: CPT | Mod: GC

## 2024-09-23 PROCEDURE — 83880 ASSAY OF NATRIURETIC PEPTIDE: CPT

## 2024-09-23 PROCEDURE — 89051 BODY FLUID CELL COUNT: CPT

## 2024-09-23 PROCEDURE — 71045 X-RAY EXAM CHEST 1 VIEW: CPT

## 2024-09-23 PROCEDURE — 83615 LACTATE (LD) (LDH) ENZYME: CPT

## 2024-09-23 PROCEDURE — 83970 ASSAY OF PARATHORMONE: CPT

## 2024-09-23 PROCEDURE — 84080 ASSAY ALKALINE PHOSPHATASES: CPT

## 2024-09-23 PROCEDURE — 86900 BLOOD TYPING SEROLOGIC ABO: CPT

## 2024-09-23 PROCEDURE — 84100 ASSAY OF PHOSPHORUS: CPT

## 2024-09-23 PROCEDURE — 86850 RBC ANTIBODY SCREEN: CPT

## 2024-09-23 RX ORDER — ALCOHOL ANTISEPTIC PADS
15 PADS, MEDICATED (EA) TOPICAL ONCE
Refills: 0 | Status: DISCONTINUED | OUTPATIENT
Start: 2024-09-23 | End: 2024-10-07

## 2024-09-23 RX ORDER — TAMSULOSIN HCL 0.4 MG
0.4 CAPSULE ORAL AT BEDTIME
Refills: 0 | Status: DISCONTINUED | OUTPATIENT
Start: 2024-09-23 | End: 2024-10-07

## 2024-09-23 RX ORDER — SODIUM CHLORIDE IRRIG SOLUTION 0.9 %
1000 SOLUTION, IRRIGATION IRRIGATION
Refills: 0 | Status: DISCONTINUED | OUTPATIENT
Start: 2024-09-23 | End: 2024-10-07

## 2024-09-23 RX ORDER — ASPIRIN 325 MG
81 TABLET ORAL DAILY
Refills: 0 | Status: DISCONTINUED | OUTPATIENT
Start: 2024-09-23 | End: 2024-10-07

## 2024-09-23 RX ORDER — ATORVASTATIN CALCIUM 10 MG/1
40 TABLET, FILM COATED ORAL AT BEDTIME
Refills: 0 | Status: DISCONTINUED | OUTPATIENT
Start: 2024-09-23 | End: 2024-10-07

## 2024-09-23 RX ORDER — FOLIC ACID 1 MG/1
1 TABLET ORAL DAILY
Refills: 0 | Status: DISCONTINUED | OUTPATIENT
Start: 2024-09-23 | End: 2024-10-07

## 2024-09-23 RX ORDER — SPIRONOLACTONE 100 MG/1
25 TABLET, FILM COATED ORAL DAILY
Refills: 0 | Status: DISCONTINUED | OUTPATIENT
Start: 2024-09-23 | End: 2024-09-24

## 2024-09-23 RX ORDER — SENNOSIDES 8.6 MG
2 TABLET ORAL AT BEDTIME
Refills: 0 | Status: DISCONTINUED | OUTPATIENT
Start: 2024-09-23 | End: 2024-10-07

## 2024-09-23 RX ORDER — ALCOHOL ANTISEPTIC PADS
25 PADS, MEDICATED (EA) TOPICAL ONCE
Refills: 0 | Status: DISCONTINUED | OUTPATIENT
Start: 2024-09-23 | End: 2024-10-07

## 2024-09-23 RX ORDER — FUROSEMIDE 10 MG/ML
40 INJECTION INTRAVENOUS DAILY
Refills: 0 | Status: DISCONTINUED | OUTPATIENT
Start: 2024-09-23 | End: 2024-09-30

## 2024-09-23 RX ORDER — ALCOHOL ANTISEPTIC PADS
12.5 PADS, MEDICATED (EA) TOPICAL ONCE
Refills: 0 | Status: DISCONTINUED | OUTPATIENT
Start: 2024-09-23 | End: 2024-10-07

## 2024-09-23 RX ORDER — GLUCAGON INJECTION, SOLUTION 0.5 MG/.1ML
1 INJECTION, SOLUTION SUBCUTANEOUS ONCE
Refills: 0 | Status: DISCONTINUED | OUTPATIENT
Start: 2024-09-23 | End: 2024-10-07

## 2024-09-23 RX ORDER — SENNOSIDES 8.6 MG
2 TABLET ORAL
Refills: 0 | DISCHARGE

## 2024-09-23 RX ORDER — POLYVINYL ALCOHOL 1 %
1 DROPS OPHTHALMIC (EYE)
Refills: 0 | Status: DISCONTINUED | OUTPATIENT
Start: 2024-09-23 | End: 2024-10-07

## 2024-09-23 RX ORDER — ACETAMINOPHEN 325 MG
650 TABLET ORAL EVERY 6 HOURS
Refills: 0 | Status: DISCONTINUED | OUTPATIENT
Start: 2024-09-23 | End: 2024-10-07

## 2024-09-23 RX ORDER — INSULIN LISPRO 100/ML
3 VIAL (ML) SUBCUTANEOUS
Refills: 0 | Status: DISCONTINUED | OUTPATIENT
Start: 2024-09-23 | End: 2024-10-07

## 2024-09-23 RX ORDER — CYANOCOBALAMIN (VITAMIN B-12) 1000MCG/ML
1000 VIAL (ML) INJECTION DAILY
Refills: 0 | Status: DISCONTINUED | OUTPATIENT
Start: 2024-09-23 | End: 2024-10-07

## 2024-09-23 RX ORDER — METOPROLOL TARTRATE 50 MG
25 TABLET ORAL DAILY
Refills: 0 | Status: DISCONTINUED | OUTPATIENT
Start: 2024-09-23 | End: 2024-10-07

## 2024-09-23 RX ORDER — PANTOPRAZOLE SODIUM 40 MG/1
40 TABLET, DELAYED RELEASE ORAL
Refills: 0 | Status: DISCONTINUED | OUTPATIENT
Start: 2024-09-23 | End: 2024-10-07

## 2024-09-23 RX ADMIN — ATORVASTATIN CALCIUM 40 MILLIGRAM(S): 10 TABLET, FILM COATED ORAL at 21:35

## 2024-09-23 RX ADMIN — Medication 0.4 MILLIGRAM(S): at 21:35

## 2024-09-23 RX ADMIN — Medication 5000 UNIT(S): at 21:35

## 2024-09-23 RX ADMIN — Medication 3 UNIT(S): at 18:33

## 2024-09-23 NOTE — ED PROVIDER NOTE - CLINICAL SUMMARY MEDICAL DECISION MAKING FREE TEXT BOX
64-year-old man with a past medical history of ESRD on hemodialysis MWF (last dialysis on 9/20/24), NIDDM, BPH,DM, CAD s/p  CABG, HTN, CHF, TIA, PAD, left toe ulcer s/p amputation sent in by PCP (Dr. Olivier) for increasing abdomen distention over the last 3 weeks.    Ascites  #ESRD on HD MWF  #HFmrEF-Last TTE in 2022  - Patient cannot have po intake without vomiting sips of food. Lying comfortably in bed. Vitals WNL. PE showed distended abdomen with clear lungs  -In the ED:   T: 36.5      HR: 68    BP: 122/61    Spo2: 95% on room air  -Alk Phos: 167  -CT abdomen/pelvis from 9/16/24:   1. Since March 28, 2024, no significant change in large volume abdominopelvic ascites..2. Moderate to large right inguinal hernia contains ascites fluid and a   short segment of nonobstructed cecum and distal ileum.3. Unchanged small left and trace right pleural effusions with small   partially imaged bibasilar opacities.4. A 3.6 cm hyperattenuating focus within the lateral aspect of the right gluteus muscle is suspicious for small hematoma.    ADMIT.  Stable for floor.

## 2024-09-23 NOTE — ED ADULT NURSE NOTE - NSFALLRISKINTERV_ED_ALL_ED
Assistance OOB with selected safe patient handling equipment if applicable/Assistance with ambulation/Communicate fall risk and risk factors to all staff, patient, and family/Monitor gait and stability/Provide visual cue: yellow wristband, yellow gown, etc/Reinforce activity limits and safety measures with patient and family/Call bell, personal items and telephone in reach/Instruct patient to call for assistance before getting out of bed/chair/stretcher/Non-slip footwear applied when patient is off stretcher/[place] to call system/Physically safe environment - no spills, clutter or unnecessary equipment/Purposeful Proactive Rounding/Room/bathroom lighting operational, light cord in reach

## 2024-09-23 NOTE — ED PROVIDER NOTE - OBJECTIVE STATEMENT
Pt is a 64-year-old male with PMH ESRD on hemodialysis MWF (last dialysis Friday), NIDDM, BPH, CAD, CABG, HTN, CHF, TIA, PAD sent in by PCP for evaluation of abdominal pain and distention x 2 weeks associated with one week of non-bloody emesis and decreased po intake. Pt follows Dr. Olivier. Denies any fever chills, sob, uri symptoms, flank pain dysuria, bloody stools, hematemesis, diarrhea, constipation, or acute rash. Denies any recent travel sick contacts or changes in diet. Denies any testicular pain. Denies any alcohol or smoking use. Pt was seen in the ED and had ct abdomen and pelvis performed on sep. 16th which showed  large volume abdominopelvic ascites, large right inguinal hernia containing ascites fluid and a short segment of non obstructed cecum and distal ileum.

## 2024-09-23 NOTE — H&P ADULT - HISTORY OF PRESENT ILLNESS
Pt is a 64-year-old man with a past medical history of ESRD on hemodialysis MWF (last dialysis on 9/20/24), NIDDM, BPH,DM, CAD s/p  CABG, HTN, CHF, TIA, PAD, left toe ulcer s/p amputation sent in by PCP (Dr. Olivier) for increasing abdomen distention over the last 3 weeks. Patient reported that he developed new onset abdominal distention over the past 3 weeks that is now causing him abdominal pain and difficulty having PO intake with vomiting. He reported that he had to vomit small amounts of food if he eats. He also reported being constipated over the past 2 days. He denied any hematemesis, sob, chest pain, orthopnea, blood in stools, swelling of the legs, fever or chills, He is aneuric and does not produce urine. He denied any history of smoking or excessive alcohol use. Patient presented to the ED on 9/16 and underwent a C, he was discharged on the same day without further work-up.     In the ED:   T: 36.5      HR: 68    BP: 122/61    Spo2: 95% on room air     Pt is a 64-year-old man with a past medical history of ESRD on hemodialysis MWF (last dialysis on 9/20/24), NIDDM, BPH,DM, CAD s/p  CABG, HTN, CHF, TIA, PAD, left toe ulcer s/p amputation sent in by PCP (Dr. Olivier) for increasing abdomen distention over the last 3 weeks. Patient reported that he developed new onset abdominal distention over the past 3 weeks that is now causing him abdominal pain and difficulty having PO intake with vomiting. He reported that he had to vomit small amounts of food if he eats. He also reported being constipated over the past 2 days. He denied any hematemesis, sob, chest pain, orthopnea, blood in stools, swelling of the legs, fever or chills, He is aneuric and does not produce urine. He denied any history of smoking or excessive alcohol use. Patient presented to the ED on 9/16 and underwent a C, he was discharged on the same day without further work-up.     In the ED:   T: 36.5      HR: 68    BP: 122/61    Spo2: 95% on room air    CT abdomen/pelvis from 9/16/24:   1. Since March 28, 2024, no significant change in large volume abdominopelvic ascites..2. Moderate to large right inguinal hernia contains ascites fluid and a   short segment of nonobstructed cecum and distal ileum.3. Unchanged small left and trace right pleural effusions with small   partially imaged bibasilar opacities.4. A 3.6 cm hyperattenuating focus within the lateral aspect of the right gluteus muscle is suspicious for small hematoma; correlation with clinical history and exam is suggested.           Pt is a 64-year-old man with a past medical history of ESRD on hemodialysis MWF (last dialysis on 9/20/24), NIDDM, BPH,DM, CAD s/p  CABG, HTN, CHF, TIA, PAD, left toe ulcer s/p amputation sent in by PCP (Dr. Olivier) for increasing abdomen distention over the last 3 weeks. Patient reported that he developed new onset abdominal distention over the past 3 weeks that is now causing him abdominal pain and difficulty having PO intake with vomiting. He reported that he had to vomit small amounts of food if he eats. He also reported being constipated over the past 2 days. He denied any hematemesis, sob, chest pain, orthopnea, blood in stools, swelling of the legs, fever or chills, He is aneuric and does not produce urine. He denied any history of smoking or excessive alcohol use. Patient presented to the ED on 9/16 and underwent a C, he was discharged on the same day without further work-up. Patient is currently in [facility]    In the ED:   T: 36.5      HR: 68    BP: 122/61    Spo2: 95% on room air    CT abdomen/pelvis from 9/16/24:   1. Since March 28, 2024, no significant change in large volume abdominopelvic ascites..2. Moderate to large right inguinal hernia contains ascites fluid and a   short segment of nonobstructed cecum and distal ileum.3. Unchanged small left and trace right pleural effusions with small   partially imaged bibasilar opacities.4. A 3.6 cm hyperattenuating focus within the lateral aspect of the right gluteus muscle is suspicious for small hematoma; correlation with clinical history and exam is suggested.

## 2024-09-23 NOTE — H&P ADULT - TIME BILLING
64 year old man from [facility] with a past medical history of ESRD on hemodialysis [facility] (last dialysis on 9/20/24), NIDDM, BPH, DM, CAD s/p  CABG, HTN, CHF, TIA, PAD, left toe ulcer s/p amputation sent in by PCP (Dr. Olivier) for increasing abdomen distention over the last 3 weeks. Patient with increase abdominal girth despite weight loss. Patient's weight done 20 lbs in the last month    vs stable    Awake alert, NAD  Lungs clear  heart S1S2  abdomen protuberant, BS+, soft, non tender  extremities decreased edema    labs noted  < from: CT Abdomen and Pelvis w/ Oral Cont and w/ IV Cont (09.16.24 @ 19:00) >    IMPRESSION:    1. Since March 28, 2024, no significant change in large volume   abdominopelvic ascites..  2. Moderate to large right inguinal hernia contains ascites fluid and a   short segment of non obstructed cecum and distal ileum.  3. Unchanged small left and trace right pleural effusions with small   partially imaged bibasilar opacities.  4. A 3.6 cm hyperattenuating focus within the lateral aspect of the right   gluteus muscle is suspicious for small hematoma; correlation with   clinical history and exam is suggested.      A/P  Nausea Vomiting  Increase Abdominal Girth due to Ascites  - CT noted  - await GI evaluation  - scheduled for paracentesis  - R/O SBP    ESRD  - continue HMD  - weight has been trending down  - leg edema improved    CAD, post CABG, CHF  on rx  - await 2d echo results    PAD, hx of toe amputation  - podiatry follows as outpatient      GI prophylaxsis  DVT Heparin  Diet NPO for procedure    Patient remains acutely ill, further plan pending the above results

## 2024-09-23 NOTE — H&P ADULT - ASSESSMENT
Pt is a 64-year-old man with a past medical history of ESRD on hemodialysis MWF (last dialysis on 9/20/24), NIDDM, BPH,DM, CAD s/p  CABG, HTN, CHF, TIA, PAD, left toe ulcer s/p amputation sent in by PCP (Dr. Olivier) for increasing abdomen distention over the last 3 weeks.    Ascites  #ESRD on HD MWF  #HFmrEF-Last TTE in 2022  - Patient cannot have po intake without vomiting sips of food. Lying comfortably in bed. Vitals WNL. PE showed distended abdomen with clear lungs  -In the ED:   T: 36.5      HR: 68    BP: 122/61    Spo2: 95% on room air  -Alk Phos: 167  -CT abdomen/pelvis from 9/16/24:   1. Since March 28, 2024, no significant change in large volume abdominopelvic ascites..2. Moderate to large right inguinal hernia contains ascites fluid and a   short segment of nonobstructed cecum and distal ileum.3. Unchanged small left and trace right pleural effusions with small   partially imaged bibasilar opacities.4. A 3.6 cm hyperattenuating focus within the lateral aspect of the right gluteus muscle is suspicious for small hematoma; correlation with clinical history and exam is suggested.  - TTE from 2022:  Left ventricular ejection fraction, by visual estimation, is 45 to 50%. Grade III diastolic dysfunction with elevated left atrial and left ventricular end-diastolic pressures. Moderate mitral regurgitation  - Patient on home PO lasix 40mg, aldactone 25 mg  -  Procedure team consult for paracentesis is in.  -  f/u ascites lab work-up. Extensive work-up ordered  - f/u Pro-Bnp   - f/u TTE  - f/u am Phosphorus and PTH  - f/u with nephrology for HD. Last HD was on Friday  - f/u with GI   - Place patient on renal diet    #CAD s/p CABG  #HTN  #History of TIA  - On home Plavix 75 mg daily, Aspirin 81 mg  - On home metoprolol succinate 25 mg , Nifedipine 60 mg daily    #DM  #left toe ulcer s/p amputation   - On Humalog 5 units before breakfast and dinner  - Will decrease it to 3U given patient is not eating that much for now    #Chronic constipation  - On Miralax and senna 2 tabs at night    #BPH  - On flomax 0.4 mg     #Full code  #DVT prophylaxis: Heparin sub q

## 2024-09-23 NOTE — ED PROVIDER NOTE - PROGRESS NOTE DETAILS
spoke with Dr. Olivier  recommends admission to hospital with gi consult and paracentesis r/o sbp  nephro consult for dialysis   pt last dialized friday

## 2024-09-23 NOTE — ED PROVIDER NOTE - PHYSICAL EXAMINATION
VITAL SIGNS: noted  CONSTITUTIONAL: Well-developed; well-nourished; in no acute distress  HEAD: Normocephalic; atraumatic  EYES: PERRL, EOM intact; conjunctiva and sclera clear  ENT: No nasal discharge;, MMM, oropharynx clear without tonsillar hypertrophy or exudates  NECK: Supple; non tender. No anterior cervical lymphadenopathy noted  CARD: S1, S2 normal; no murmurs, gallops, or rubs. Regular rate and rhythm  RESP: CTAB/L, no wheezes, rales or rhonchi  ABD: Normal bowel sounds;  distended; non-tender; no organoomegaly. No CVA tenderness  EXT: Normal ROM. No calf tenderness or edema. Distal pulses intact  NEURO: Awake and alert, oriented. Grossly unremarkable. No focal deficits.  SKIN: Skin exam is warm and dry, no acute rash

## 2024-09-23 NOTE — ED ADULT NURSE REASSESSMENT NOTE - NS ED NURSE REASSESS COMMENT FT1
received handoff form RN. Pt assessed at bedside. Pt AXOx3, RR even and unlabored, no distress noted at this time. IV intact. awaiting transport to floor.

## 2024-09-23 NOTE — H&P ADULT - NSHPLABSRESULTS_GEN_ALL_CORE
9.3    4.93  )-----------( 191      ( 23 Sep 2024 14:10 )             29.8       09-23    135  |  93[L]  |  31[H]  ----------------------------<  102[H]  4.1   |  26  |  5.1[HH]    Ca    7.9[L]      23 Sep 2024 14:10    TPro  7.1  /  Alb  3.1[L]  /  TBili  0.6  /  DBili  x   /  AST  6   /  ALT  <5  /  AlkPhos  167[H]  09-23              Urinalysis Basic - ( 23 Sep 2024 14:10 )    Color: x / Appearance: x / SG: x / pH: x  Gluc: 102 mg/dL / Ketone: x  / Bili: x / Urobili: x   Blood: x / Protein: x / Nitrite: x   Leuk Esterase: x / RBC: x / WBC x   Sq Epi: x / Non Sq Epi: x / Bacteria: x            Lactate Trend            CAPILLARY BLOOD GLUCOSE                  RADIOLOGY & ADDITIONAL TESTS:    Imaging Personally Reviewed:  [ ] YES     EKG personally reviewed [ ] YES, interpretation:     Telemetry reviewed:

## 2024-09-23 NOTE — H&P ADULT - NSHPPHYSICALEXAM_GEN_ALL_CORE
T(C): 36.5 (09-23-24 @ 16:01), Max: 36.5 (09-23-24 @ 11:05)  HR: 68 (09-23-24 @ 16:01) (68 - 68)  BP: 127/65 (09-23-24 @ 16:01) (122/61 - 127/65)  RR: 18 (09-23-24 @ 16:01) (18 - 18)  SpO2: 98% (09-23-24 @ 16:01) (95% - 98%)    CONSTITUTIONAL: Lying comfortably in bed.  EYES: PERRLA and symmetric, EOMI, No conjunctival or scleral injection, non-icteric  ENMT: Oral mucosa with moist membranes. Normal dentition; no pharyngeal injection or exudates             NECK: Supple, symmetric and without tracheal deviation   RESP: No respiratory distress, no use of accessory muscles; CTA b/l, no WRR  CV: RRR, +S1S2, no MRG; no JVD; no peripheral edema  GI: Soft, distended abdomen. No guarding or rebound tenderness   LYMPH: No cervical LAD or tenderness; no axillary LAD or tenderness; no inguinal LAD or tenderness  SKIN: Amputated left 5th toe. Patient has compression stocking on both legs  NEURO: CN II-XII intact; normal reflexes in upper and lower extremities, sensation intact in upper and lower extremities b/l to light touch   PSYCH: Appropriate insight/judgment; A+O x 3, mood and affect appropriate, recent/remote memory intact

## 2024-09-24 ENCOUNTER — RESULT REVIEW (OUTPATIENT)
Age: 64
End: 2024-09-24

## 2024-09-24 LAB
A1C WITH ESTIMATED AVERAGE GLUCOSE RESULT: 6.6 % — HIGH (ref 4–5.6)
ALBUMIN SERPL ELPH-MCNC: 2.8 G/DL — LOW (ref 3.5–5.2)
ALP SERPL-CCNC: 152 U/L — HIGH (ref 30–115)
ALT FLD-CCNC: <5 U/L — SIGNIFICANT CHANGE UP (ref 0–41)
ANION GAP SERPL CALC-SCNC: 12 MMOL/L — SIGNIFICANT CHANGE UP (ref 7–14)
AST SERPL-CCNC: 6 U/L — SIGNIFICANT CHANGE UP (ref 0–41)
BASOPHILS # BLD AUTO: 0.05 K/UL — SIGNIFICANT CHANGE UP (ref 0–0.2)
BASOPHILS NFR BLD AUTO: 1.3 % — HIGH (ref 0–1)
BILIRUB SERPL-MCNC: 0.5 MG/DL — SIGNIFICANT CHANGE UP (ref 0.2–1.2)
BUN SERPL-MCNC: 33 MG/DL — HIGH (ref 10–20)
CALCIUM SERPL-MCNC: 7.8 MG/DL — LOW (ref 8.4–10.5)
CALCIUM SERPL-MCNC: 7.9 MG/DL — LOW (ref 8.4–10.5)
CHLORIDE SERPL-SCNC: 94 MMOL/L — LOW (ref 98–110)
CO2 SERPL-SCNC: 31 MMOL/L — SIGNIFICANT CHANGE UP (ref 17–32)
CREAT SERPL-MCNC: 5.2 MG/DL — CRITICAL HIGH (ref 0.7–1.5)
EGFR: 11 ML/MIN/1.73M2 — LOW
EOSINOPHIL # BLD AUTO: 0.22 K/UL — SIGNIFICANT CHANGE UP (ref 0–0.7)
EOSINOPHIL NFR BLD AUTO: 5.6 % — SIGNIFICANT CHANGE UP (ref 0–8)
ESTIMATED AVERAGE GLUCOSE: 143 MG/DL — HIGH (ref 68–114)
GGT SERPL-CCNC: 26 U/L — SIGNIFICANT CHANGE UP (ref 1–40)
GLUCOSE BLDC GLUCOMTR-MCNC: 106 MG/DL — HIGH (ref 70–99)
GLUCOSE BLDC GLUCOMTR-MCNC: 115 MG/DL — HIGH (ref 70–99)
GLUCOSE BLDC GLUCOMTR-MCNC: 124 MG/DL — HIGH (ref 70–99)
GLUCOSE BLDC GLUCOMTR-MCNC: 99 MG/DL — SIGNIFICANT CHANGE UP (ref 70–99)
GLUCOSE SERPL-MCNC: 95 MG/DL — SIGNIFICANT CHANGE UP (ref 70–99)
HCT VFR BLD CALC: 29.5 % — LOW (ref 42–52)
HGB BLD-MCNC: 9.3 G/DL — LOW (ref 14–18)
IMM GRANULOCYTES NFR BLD AUTO: 0.3 % — SIGNIFICANT CHANGE UP (ref 0.1–0.3)
LYMPHOCYTES # BLD AUTO: 0.43 K/UL — LOW (ref 1.2–3.4)
LYMPHOCYTES # BLD AUTO: 11 % — LOW (ref 20.5–51.1)
MAGNESIUM SERPL-MCNC: 1.9 MG/DL — SIGNIFICANT CHANGE UP (ref 1.8–2.4)
MCHC RBC-ENTMCNC: 26.5 PG — LOW (ref 27–31)
MCHC RBC-ENTMCNC: 31.5 G/DL — LOW (ref 32–37)
MCV RBC AUTO: 84 FL — SIGNIFICANT CHANGE UP (ref 80–94)
MONOCYTES # BLD AUTO: 0.44 K/UL — SIGNIFICANT CHANGE UP (ref 0.1–0.6)
MONOCYTES NFR BLD AUTO: 11.2 % — HIGH (ref 1.7–9.3)
NEUTROPHILS # BLD AUTO: 2.77 K/UL — SIGNIFICANT CHANGE UP (ref 1.4–6.5)
NEUTROPHILS NFR BLD AUTO: 70.6 % — SIGNIFICANT CHANGE UP (ref 42.2–75.2)
NRBC # BLD: 0 /100 WBCS — SIGNIFICANT CHANGE UP (ref 0–0)
PHOSPHATE SERPL-MCNC: 3.2 MG/DL — SIGNIFICANT CHANGE UP (ref 2.1–4.9)
PLATELET # BLD AUTO: 171 K/UL — SIGNIFICANT CHANGE UP (ref 130–400)
PMV BLD: 10.4 FL — SIGNIFICANT CHANGE UP (ref 7.4–10.4)
POTASSIUM SERPL-MCNC: 3.7 MMOL/L — SIGNIFICANT CHANGE UP (ref 3.5–5)
POTASSIUM SERPL-SCNC: 3.7 MMOL/L — SIGNIFICANT CHANGE UP (ref 3.5–5)
PROT SERPL-MCNC: 6.5 G/DL — SIGNIFICANT CHANGE UP (ref 6–8)
PTH-INTACT FLD-MCNC: 135 PG/ML — HIGH (ref 15–65)
RBC # BLD: 3.51 M/UL — LOW (ref 4.7–6.1)
RBC # FLD: 17.2 % — HIGH (ref 11.5–14.5)
SODIUM SERPL-SCNC: 137 MMOL/L — SIGNIFICANT CHANGE UP (ref 135–146)
WBC # BLD: 3.92 K/UL — LOW (ref 4.8–10.8)
WBC # FLD AUTO: 3.92 K/UL — LOW (ref 4.8–10.8)

## 2024-09-24 PROCEDURE — 99223 1ST HOSP IP/OBS HIGH 75: CPT

## 2024-09-24 PROCEDURE — 93306 TTE W/DOPPLER COMPLETE: CPT | Mod: 26

## 2024-09-24 RX ORDER — MAG HYDROX/ALUMINUM HYD/SIMETH 200-200-20
30 SUSPENSION, ORAL (FINAL DOSE FORM) ORAL EVERY 6 HOURS
Refills: 0 | Status: DISCONTINUED | OUTPATIENT
Start: 2024-09-24 | End: 2024-09-24

## 2024-09-24 RX ORDER — ONDANSETRON HCL/PF 4 MG/2 ML
4 VIAL (ML) INJECTION ONCE
Refills: 0 | Status: DISCONTINUED | OUTPATIENT
Start: 2024-09-24 | End: 2024-09-24

## 2024-09-24 RX ORDER — CHLORHEXIDINE GLUCONATE ORAL RINSE 1.2 MG/ML
1 SOLUTION DENTAL
Refills: 0 | Status: DISCONTINUED | OUTPATIENT
Start: 2024-09-24 | End: 2024-10-07

## 2024-09-24 RX ORDER — INFLUENZA VIRUS VACCINE 15; 15; 15; 15 UG/.5ML; UG/.5ML; UG/.5ML; UG/.5ML
0.5 SUSPENSION INTRAMUSCULAR ONCE
Refills: 0 | Status: DISCONTINUED | OUTPATIENT
Start: 2024-09-24 | End: 2024-10-07

## 2024-09-24 RX ORDER — ONDANSETRON HCL/PF 4 MG/2 ML
4 VIAL (ML) INJECTION EVERY 6 HOURS
Refills: 0 | Status: DISCONTINUED | OUTPATIENT
Start: 2024-09-24 | End: 2024-10-07

## 2024-09-24 RX ADMIN — Medication 0.4 MILLIGRAM(S): at 21:06

## 2024-09-24 RX ADMIN — Medication 5000 UNIT(S): at 05:12

## 2024-09-24 RX ADMIN — PANTOPRAZOLE SODIUM 40 MILLIGRAM(S): 40 TABLET, DELAYED RELEASE ORAL at 05:12

## 2024-09-24 RX ADMIN — Medication 3 UNIT(S): at 17:29

## 2024-09-24 RX ADMIN — Medication 30 MILLILITER(S): at 11:38

## 2024-09-24 RX ADMIN — FOLIC ACID 1 MILLIGRAM(S): 1 TABLET ORAL at 11:58

## 2024-09-24 RX ADMIN — Medication 2 TABLET(S): at 21:06

## 2024-09-24 RX ADMIN — Medication 60 MILLIGRAM(S): at 05:12

## 2024-09-24 RX ADMIN — Medication 5000 UNIT(S): at 21:06

## 2024-09-24 RX ADMIN — Medication 75 MILLIGRAM(S): at 11:58

## 2024-09-24 RX ADMIN — Medication 1000 MICROGRAM(S): at 11:58

## 2024-09-24 RX ADMIN — ATORVASTATIN CALCIUM 40 MILLIGRAM(S): 10 TABLET, FILM COATED ORAL at 21:06

## 2024-09-24 RX ADMIN — Medication 81 MILLIGRAM(S): at 11:59

## 2024-09-24 RX ADMIN — Medication 25 MILLIGRAM(S): at 05:12

## 2024-09-24 RX ADMIN — SPIRONOLACTONE 25 MILLIGRAM(S): 100 TABLET, FILM COATED ORAL at 05:12

## 2024-09-24 RX ADMIN — FUROSEMIDE 40 MILLIGRAM(S): 10 INJECTION INTRAVENOUS at 05:12

## 2024-09-24 NOTE — PATIENT PROFILE ADULT - FALL HARM RISK - HARM RISK INTERVENTIONS

## 2024-09-24 NOTE — CONSULT NOTE ADULT - ASSESSMENT
Assessment and Plan  Case of a 64 year old male patient known to have ESRD on HD, NIDDM, CAD s/p CABG, HFpEF, HTN, TIA, PAD s/p left toe ulcer requiring amputation, BPH, and large ascites since 03/2024 who was sent to  ED by PCP on 09/23 for worsening abdominal distention, found to have stable large ascites. Status post HD session 09/24. We are consulted in setting of large ascites.      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 and Chronically Elevated Cr despite HD   Rule Out Cardiac in Setting of CHF   No evidence of cirrhosis on imaging  Asymptomatic Cholelithiasis  * Incidentally found to have large ascites on CT 03/2024 during trauma workup post fall  * Takes PO lasix 40mg QD at home for CHF/ESRD  * No previous paracentesis  * No history of cirrhosis; drinks wine/gin every few months; last one was years ago  * Hemodynamically stable  * Labs: WBC 4.93, Hb 9.3, MCV 83.9, Plt 191, Na 135, K 4.1, BUN 31, Cr 5.1 09/23  * LFTs 0.6/167/6/5 on 09/23 (previous LFT 2022 0.3/178/27/31 -> 03/2024 0.3/224/20/14 -> 0.6/152/12/5 on 09/16/2024); albumin 3.1   * INR 1.21 on 09/16   * Total iron 39, S% 29%, TIBC 133, ferritin 1126 in 10/2022  * Acute hepatitis panel 2022 unremarkable  * No previous US abdomen   * Recent CT abdomen pelvis PO IC 09/16 normal liver, spleen, pancreas, cholelithiasis, mod-large right inguinal hernia, no significant change in large ascites; 3.6x2.8cm hematoma along lateral aspect of right gluteus  * No previous EGD; had colonoscopy with Dr Montenegro 2 years ago  * No FHx of liver diseases or GI cancers    RECOMMENDATIONS  - Trend LFTs  - Check iso-ALP and GGT  - Check US abdomen. Obtain diagnostic tap. IR team consulted by primary team  - Serial abdominal exams  - Monitor pain: on tylenol PRN  - Nephrology team on board for ESRD management: s/p HD session on 09/24  - Check TTE. Continue home dose of PO lasix 40mg QD. Discontinue aldacctone 25mg QD  - Monitor urine in/out (if still making urine). Check u/a and urine albumin/Cr ratio  - Monitor electrolytes and replete as indicated  - Avoid hepatotoxic agents      Chronic Anemia: ACD and Iron deficiency anemia  No overt GI bleeding  * Hb 8.9 on 09/16 -> 9.3 on 09/23; MCV 83.9 (previous Hb 7-8 in 03/2024)  * Total Fe 34, TIIBC 133, S% 29, ferritin 1126 in 10/2022  * INR 1.21 on 09/16  * No melena or hematochezia  * No previous EGD; had colonoscopy with Dr Montenegro 2 years ago  * No FHx of liver diseases or GI cancers    RECOMMENDATIONS  - Trend CBC and keep active T/S  - Nephrology team on board: on HD  - Continue folic acid and cyanocobalamin supplements  - Outpatient follow up with Dr Montenegro to ensure patient is uptodate with scopes        Thank you for your consult.  - Please note that plan was communicated with medical team.   - Please reach GI on [phone] during weekdays till 5pm.  - Please call the GI service line after 5pm on Weekdays and anytime on Weekends: [phone].      Albert Stanley MD  PGY - 5 Gastroenterology Fellow   [facility]

## 2024-09-24 NOTE — CONSULT NOTE ADULT - ATTENDING COMMENTS
64 y o  M with T2DM CAD s/p CABG, HFpEF, HTN, TIA, V ESRD on HD, PAD s/p left toe ulcer s/p amputation admitted with abdominal distension due to ascites.   No icterus  AVF L arm  Abdomen distended non tender ascites +  No asterixis     Ascites with ESRD on HD and HFpEF likely to be nephrogenic ascites  No evidence of liver disease  Echo pending    Paracentesis with fluid studies

## 2024-09-24 NOTE — CONSULT NOTE ADULT - SUBJECTIVE AND OBJECTIVE BOX
Hepatology Initial Consult Note      Location: [facility] 4B 012 B [facility]  Patient Name: LAWRENCE SCHWABACHER  Age: 64y  Gender: Male      Chief Complaint  Patient is a 64y old Male who presents with a chief complaint of   Primary diagnosis of Abdominal pain        Reason for Consult  Ascites        History of Present Illness  64 year old male patient known to have ESRD on HD, NIDDM, CAD s/p CABG, HFpEF, HTN, TIA, PAD s/p left toe ulcer requiring amputation, BPH, and large ascites since 03/2024 who was sent to  ED by PCP on 09/23 for worsening abdominal distention, found to have stable large ascites. Status post HD session 09/24. We are consulted in setting of large ascites.  Summary:  * Incidentally found to have large ascites on CT 03/2024 during trauma workup post fall  * Takes PO lasix 40mg QD at home for CHF/ESRD  * No previous paracentesis  * No history of cirrhosis; drinks wine/gin every few months; last one was years ago  * Hemodynamically stable  * Labs: WBC 4.93, Hb 9.3, MCV 83.9, Plt 191, Na 135, K 4.1, BUN 31, Cr 5.1 09/23  * LFTs 0.6/167/6/5 on 09/23 (previous LFT 2022 0.3/178/27/31 -> 03/2024 0.3/224/20/14 -> 0.6/152/12/5 on 09/16/2024); albumin 3.1   * INR 1.21 on 09/16   * Total iron 39, S% 29%, TIBC 133, ferritin 1126 in 10/2022  * Acute hepatitis panel 2022 unremarkable  * No previous US abdomen   * Recent CT abdomen pelvis PO IC 09/16 normal liver, spleen, pancreas, cholelithiasis, mod-large right inguinal hernia, no significant change in large ascites; 3.6x2.8cm hematoma along lateral aspect of right gluteus  * No previous EGD; had colonoscopy with Dr Montenegro 2 years ago  * No FHx of liver diseases or GI cancers          Prior EGD:  no prior      Prior Colonoscopy:  as below      Past Medical and Surgical History:  S/P CABG (coronary artery bypass graft)  x4    Diabetes mellitus    Transient ischemic attack (TIA)  2017; 2008    Chronic kidney disease (CKD)  Stage IV    Hypertension    Stented coronary artery  in 2008    Myocardial infarction  2012    PAD (peripheral artery disease)  S/p bypass left leg    HLD (hyperlipidemia)    BPH (benign prostatic hyperplasia)    Pain in left knee  s/p fall    OA (osteoarthritis)    [place] (hard of hearing)    Chronic anemia    History of medical problems  left arm precaution    S/P CABG (coronary artery bypass graft)  2012    H/O arterial bypass of lower limb  Left Lower Extremity (2016)    History of surgery  Left CEA (2017)  Left Pinkie toe Amputation (2014)  CABG x 4 (2012)  Card cath - stent (2008)      AV fistula  2019  LEFT AV FISTULA        Home Medications:  Home Medications:  Aldactone 25 mg oral tablet: 1 tab(s) orally once a day (23 Sep 2024 17:27)  aspirin 81 mg oral tablet: 1 tab(s) orally once a day (08 May 2024 09:32)  furosemide 40 mg oral tablet: 1 tab(s) orally once a day (08 May 2024 09:32)  HumaLOG 100 units/mL injectable solution: 5 international unit(s) injectable 2 times a day (with meals) (08 May 2024 09:32)  NIFEdipine (Eqv-Procardia XL) 60 mg oral tablet, extended release: 1 tab(s) orally once a day (08 May 2024 09:23)  ocular lubricant ophthalmic solution: 3 drop(s) to each affected eye every 2 hours, As needed, Dry Eyes (08 May 2024 09:32)  pantoprazole 40 mg oral delayed release tablet: 1 tab(s) orally once a day (08 May 2024 09:16)  Plavix 75 mg oral tablet: 1 tab(s) orally once a day (08 May 2024 09:32)  senna (sennosides) 8.6 mg oral tablet: 2 tab(s) orally once a day (at bedtime) (23 Sep 2024 17:27)  tamsulosin 0.4 mg oral capsule: 1 cap(s) orally once a day (at bedtime) (08 May 2024 09:32)  Tylenol 325 mg oral tablet: 2 tab(s) orally every 6 hours, As Needed (08 May 2024 09:32)      Social History:  Tobacco: denies (only smoked 3 months in high school)  Alcohol: as below  Drugs: denies      Allergies:  No Known Allergies      Family History:  Family history of heart disease (Father)  DM (diabetes mellitus) (Mother)  ESRD (end stage renal disease) on dialysis (Mother)          Vital Signs in the last 24 hours   Vitals Summary T(C): 36.4 (09-24-24 @ 17:43), Max: 36.7 (09-23-24 @ 22:00)  HR: 67 (09-24-24 @ 17:43) (64 - 76)  BP: 116/67 (09-24-24 @ 17:43) (114/71 - 151/69)  RR: 18 (09-24-24 @ 17:43) (16 - 18)  SpO2: 98% (09-24-24 @ 17:43) (94% - 98%)  Vent Data   Intake/ Output   09-24-24 @ 07:01  -  09-24-24 @ 18:21  --------------------------------------------------------  IN: 0 mL / OUT: 2000 mL / NET: -2000 mL        Physical Exam  * General Appearance: Alert, cooperative, interactive, oriented to time, place, and person, in no acute distress  * Eyes: PERRL, conjunctiva/corneas clear, EOM's intact, fundi benign, both eyes  * Lungs: Good bilateral air entry, audible crackles  * Heart: Regular Rate and Rhythm, normal S1 and S2, no audible murmur, rub, or gallop  * Abdomen: Symmetric, softly distended, non-tender, bowel sounds active all four quadrants, no masses  * Extremities: +1 lower extremity pitting edema bilaterally; wrapped      Investigations   Laboratory Workup      - CBC:                        9.3    3.92  )-----------( 171      ( 24 Sep 2024 06:56 )             29.5       - Hgb Trend:  9.3  09-24-24 @ 06:56  9.3  09-23-24 @ 14:10          - Chemistry:  09-24    137  |  94[L]  |  33[H]  ----------------------------<  95  3.7   |  31  |  5.2[HH]    Ca    7.9[L]      24 Sep 2024 06:56  Phos  3.2     09-24  Mg     1.9     09-24    TPro  6.5  /  Alb  2.8[L]  /  TBili  0.5  /  DBili  x   /  AST  6   /  ALT  <5  /  AlkPhos  152[H]  09-24    Liver panel trend:  TBili 0.5   /   AST 6   /   ALT <5   /   AlkP 152   /   Tptn 6.5   /   Alb 2.8    /   DBili --      09-24  TBili 0.6   /   AST 6   /   ALT <5   /   AlkP 167   /   Tptn 7.1   /   Alb 3.1    /   DBili --      09-23  TBili 0.6   /   AST 12   /   ALT <5   /   AlkP 152   /   Tptn 7.1   /   Alb 3.1    /   DBili <0.2      09-16      - Coagulation Studies:      - ABG:      - Cardiac Markers:        Microbiological Workup  Urinalysis Basic - ( 24 Sep 2024 06:56 )    Color: x / Appearance: x / SG: x / pH: x  Gluc: 95 mg/dL / Ketone: x  / Bili: x / Urobili: x   Blood: x / Protein: x / Nitrite: x   Leuk Esterase: x / RBC: x / WBC x   Sq Epi: x / Non Sq Epi: x / Bacteria: x          Radiological Workup            Current Medications  Standing Medications  aspirin  chewable 81 milliGRAM(s) Oral daily  atorvastatin 40 milliGRAM(s) Oral at bedtime  chlorhexidine 2% Cloths 1 Application(s) Topical <User Schedule>  clopidogrel Tablet 75 milliGRAM(s) Oral daily  cyanocobalamin 1000 MICROGram(s) Oral daily  dextrose 5%. 1000 milliLiter(s) (50 mL/Hr) IV Continuous <Continuous>  dextrose 5%. 1000 milliLiter(s) (100 mL/Hr) IV Continuous <Continuous>  dextrose 50% Injectable 25 Gram(s) IV Push once  dextrose 50% Injectable 12.5 Gram(s) IV Push once  dextrose 50% Injectable 25 Gram(s) IV Push once  folic acid 1 milliGRAM(s) Oral daily  furosemide    Tablet 40 milliGRAM(s) Oral daily  glucagon  Injectable 1 milliGRAM(s) IntraMuscular once  heparin   Injectable 5000 Unit(s) SubCutaneous every 8 hours  influenza  Vaccine (HIGH DOSE) 0.5 milliLiter(s) IntraMuscular once  insulin lispro Injectable (ADMELOG) 3 Unit(s) SubCutaneous before dinner  insulin lispro Injectable (ADMELOG) 3 Unit(s) SubCutaneous before breakfast  metoprolol succinate ER 25 milliGRAM(s) Oral daily  NIFEdipine XL 60 milliGRAM(s) Oral daily  pantoprazole    Tablet 40 milliGRAM(s) Oral before breakfast  senna 2 Tablet(s) Oral at bedtime  tamsulosin 0.4 milliGRAM(s) Oral at bedtime    PRN Medications  acetaminophen     Tablet .. 650 milliGRAM(s) Oral every 6 hours PRN Mild Pain (1 - 3)  artificial  tears Solution 1 Drop(s) Both EYES every 2 hours PRN Dry Eyes  dextrose Oral Gel 15 Gram(s) Oral once PRN Blood Glucose LESS THAN 70 milliGRAM(s)/deciliter  ondansetron Injectable 4 milliGRAM(s) IV Push every 6 hours PRN Nausea and/or Vomiting    Singles Doses Administered      
Pt is a 64-year-old man with a past medical history of ESRD on hemodialysis MWF (last dialysis on 9/20/24), NIDDM, BPH,DM, CAD s/p  CABG, HTN, CHF, TIA, PAD, left toe ulcer s/p amputation sent in by PCP (Dr. Olivier) for increasing abdomen distention over the last 3 weeks.      PAST HISTORY  --------------------------------------------------------------------------------  PAST MEDICAL & SURGICAL HISTORY:  S/P CABG (coronary artery bypass graft)  x4      Diabetes mellitus    Transient ischemic attack (TIA)  2017; 2008      Chronic kidney disease (CKD)  Stage IV      Hypertension      Stented coronary artery  in 2008      Myocardial infarction  2012      PAD (peripheral artery disease)  S/p bypass left leg      HLD (hyperlipidemia)      BPH (benign prostatic hyperplasia)      Pain in left knee  s/p fall      OA (osteoarthritis)      [place] (hard of hearing)      Chronic anemia      History of medical problems  left arm precaution      S/P CABG (coronary artery bypass graft)  2012      H/O arterial bypass of lower limb  Left Lower Extremity (2016)      History of surgery  Left CEA (2017)  Left Pinkie toe Amputation (2014)  CABG x 4 (2012)  Card cath - stent (2008)        AV fistula  2019  LEFT AV FISTULA        FAMILY HISTORY:  Family history of heart disease (Father)    DM (diabetes mellitus) (Mother)    ESRD (end stage renal disease) on dialysis (Mother)      PAST SOCIAL HISTORY:    ALLERGIES & MEDICATIONS  --------------------------------------------------------------------------------  Allergies    No Known Allergies    Intolerances      Standing Inpatient Medications  aspirin  chewable 81 milliGRAM(s) Oral daily  atorvastatin 40 milliGRAM(s) Oral at bedtime  clopidogrel Tablet 75 milliGRAM(s) Oral daily  cyanocobalamin 1000 MICROGram(s) Oral daily  dextrose 5%. 1000 milliLiter(s) IV Continuous <Continuous>  dextrose 5%. 1000 milliLiter(s) IV Continuous <Continuous>  dextrose 50% Injectable 25 Gram(s) IV Push once  dextrose 50% Injectable 12.5 Gram(s) IV Push once  dextrose 50% Injectable 25 Gram(s) IV Push once  folic acid 1 milliGRAM(s) Oral daily  furosemide    Tablet 40 milliGRAM(s) Oral daily  glucagon  Injectable 1 milliGRAM(s) IntraMuscular once  heparin   Injectable 5000 Unit(s) SubCutaneous every 8 hours  influenza  Vaccine (HIGH DOSE) 0.5 milliLiter(s) IntraMuscular once  insulin lispro Injectable (ADMELOG) 3 Unit(s) SubCutaneous before dinner  insulin lispro Injectable (ADMELOG) 3 Unit(s) SubCutaneous before breakfast  metoprolol succinate ER 25 milliGRAM(s) Oral daily  NIFEdipine XL 60 milliGRAM(s) Oral daily  pantoprazole    Tablet 40 milliGRAM(s) Oral before breakfast  senna 2 Tablet(s) Oral at bedtime  spironolactone 25 milliGRAM(s) Oral daily  tamsulosin 0.4 milliGRAM(s) Oral at bedtime    PRN Inpatient Medications  acetaminophen     Tablet .. 650 milliGRAM(s) Oral every 6 hours PRN  artificial  tears Solution 1 Drop(s) Both EYES every 2 hours PRN  dextrose Oral Gel 15 Gram(s) Oral once PRN          VITALS/PHYSICAL EXAM  --------------------------------------------------------------------------------  T(C): 36.3 (09-24-24 @ 07:08), Max: 36.7 (09-23-24 @ 22:00)  HR: 70 (09-24-24 @ 07:08) (65 - 76)  BP: 146/70 (09-24-24 @ 07:08) (122/61 - 151/69)  RR: 16 (09-24-24 @ 07:08) (16 - 18)  SpO2: 94% (09-24-24 @ 07:08) (94% - 98%)  Wt(kg): --  Height (cm): 180.3 (09-23-24 @ 11:05)  Weight (kg): 93.4 (09-23-24 @ 11:05)  BMI (kg/m2): 28.7 (09-23-24 @ 11:05)  BSA (m2): 2.13 (09-23-24 @ 11:05)      Physical Exam:  	Gen: NAD  	Pulm: CTA B/L  	CV:  S1S2; no rub  	Abd: +distended  	LE: dressing   	Vascular access:avf    LABS/STUDIES  --------------------------------------------------------------------------------              9.3    3.92  >-----------<  171      [09-24-24 @ 06:56]              29.5     137  |  94  |  33  ----------------------------<  95      [09-24-24 @ 06:56]  3.7   |  31  |  5.2        Ca     7.9     [09-24-24 @ 06:56]      Mg     1.9     [09-24-24 @ 06:56]      Phos  3.2     [09-24-24 @ 06:56]    TPro  6.5  /  Alb  2.8  /  TBili  0.5  /  DBili  x   /  AST  6   /  ALT  <5  /  AlkPhos  152  [09-24-24 @ 06:56]    Creatinine Trend:  SCr 5.2 [09-24 @ 06:56]  SCr 5.1 [09-23 @ 14:10]  SCr 3.4 [09-16 @ 15:50]    Urinalysis - [09-24-24 @ 06:56]      Color  / Appearance  / SG  / pH       Gluc 95 / Ketone   / Bili  / Urobili        Blood  / Protein  / Leuk Est  / Nitrite       RBC  / WBC  / Hyaline  / Gran  / Sq Epi  / Non Sq Epi  / Bacteria       HbA1c 5.8      [01-30-20 @ 06:46]    HBsAb <3.0      [12-29-19 @ 17:44]  HBsAb Nonreact      [11-03-22 @ 06:40]  HBsAg Nonreact      [11-03-22 @ 06:40]  HBcAb Nonreact      [11-03-22 @ 06:40]  HCV 0.14, Nonreact      [10-27-22 @ 04:09]    < from: CT Abdomen and Pelvis w/ Oral Cont and w/ IV Cont (09.16.24 @ 19:00) >    1. Since March 28, 2024, no significant change in large volume   abdominopelvic ascites..  2. Moderate to large right inguinal hernia contains ascites fluid and a   short segment of nonobstructed cecum and distal ileum.    < end of copied text >  < from: CT Abdomen and Pelvis w/ Oral Cont and w/ IV Cont (09.16.24 @ 19:00) >  3. Unchanged small left and trace right pleural effusions with small   partially imaged bibasilar opacities.  4. A 3.6 cm hyperattenuating focus within the lateral aspect of the right   gluteus muscle is suspicious for small hematoma; correlation with   clinical history and exam is suggested.    < end of copied text >

## 2024-09-24 NOTE — CONSULT NOTE ADULT - ASSESSMENT
Pt is a 64-year-old man with a past medical history of ESRD on hemodialysis MWF (last dialysis on 9/20/24), NIDDM, BPH,DM, CAD s/p  CABG, HTN, CHF, TIA, PAD, left toe ulcer s/p amputation sent in by PCP (Dr. Olivier) for increasing abdomen distention over the last 3 weeks.  hd today avf/ standard bath uf 2 liters as tolerated   ph at goal   BP controlled   on aldactone ???  d/c lasix if anuric   paracentesis pending / GI evaluation   h/h noted will resume [facility]SEY with HD / check iron stores   will follow

## 2024-09-25 ENCOUNTER — RESULT REVIEW (OUTPATIENT)
Age: 64
End: 2024-09-25

## 2024-09-25 LAB
24R-OH-CALCIDIOL SERPL-MCNC: 15 NG/ML — LOW (ref 30–80)
ALBUMIN FLD-MCNC: 2.4 G/DL — SIGNIFICANT CHANGE UP
ALBUMIN SERPL ELPH-MCNC: 2.9 G/DL — LOW (ref 3.5–5.2)
ALP SERPL-CCNC: 153 U/L — HIGH (ref 30–115)
ALT FLD-CCNC: <5 U/L — SIGNIFICANT CHANGE UP (ref 0–41)
ANION GAP SERPL CALC-SCNC: 15 MMOL/L — HIGH (ref 7–14)
AST SERPL-CCNC: 6 U/L — SIGNIFICANT CHANGE UP (ref 0–41)
B PERT IGG+IGM PNL SER: CLEAR — SIGNIFICANT CHANGE UP
BASOPHILS # BLD AUTO: 0.07 K/UL — SIGNIFICANT CHANGE UP (ref 0–0.2)
BASOPHILS NFR BLD AUTO: 1.6 % — HIGH (ref 0–1)
BILIRUB SERPL-MCNC: 0.4 MG/DL — SIGNIFICANT CHANGE UP (ref 0.2–1.2)
BUN SERPL-MCNC: 24 MG/DL — HIGH (ref 10–20)
CALCIUM SERPL-MCNC: 7.9 MG/DL — LOW (ref 8.4–10.5)
CHLORIDE SERPL-SCNC: 95 MMOL/L — LOW (ref 98–110)
CO2 SERPL-SCNC: 25 MMOL/L — SIGNIFICANT CHANGE UP (ref 17–32)
COLOR FLD: YELLOW — SIGNIFICANT CHANGE UP
CREAT SERPL-MCNC: 4.6 MG/DL — CRITICAL HIGH (ref 0.7–1.5)
EGFR: 13 ML/MIN/1.73M2 — LOW
EOSINOPHIL # BLD AUTO: 0.14 K/UL — SIGNIFICANT CHANGE UP (ref 0–0.7)
EOSINOPHIL NFR BLD AUTO: 3.1 % — SIGNIFICANT CHANGE UP (ref 0–8)
FLUID INTAKE SUBSTANCE CLASS: SIGNIFICANT CHANGE UP
GGT SERPL-CCNC: 23 U/L — SIGNIFICANT CHANGE UP (ref 1–40)
GLUCOSE BLDC GLUCOMTR-MCNC: 104 MG/DL — HIGH (ref 70–99)
GLUCOSE BLDC GLUCOMTR-MCNC: 107 MG/DL — HIGH (ref 70–99)
GLUCOSE BLDC GLUCOMTR-MCNC: 81 MG/DL — SIGNIFICANT CHANGE UP (ref 70–99)
GLUCOSE BLDC GLUCOMTR-MCNC: 85 MG/DL — SIGNIFICANT CHANGE UP (ref 70–99)
GLUCOSE FLD-MCNC: 74 MG/DL — SIGNIFICANT CHANGE UP
GLUCOSE SERPL-MCNC: 81 MG/DL — SIGNIFICANT CHANGE UP (ref 70–99)
HCT VFR BLD CALC: 28 % — LOW (ref 42–52)
HGB BLD-MCNC: 8.8 G/DL — LOW (ref 14–18)
IMM GRANULOCYTES NFR BLD AUTO: 0.2 % — SIGNIFICANT CHANGE UP (ref 0.1–0.3)
LDH SERPL L TO P-CCNC: 115 U/L — SIGNIFICANT CHANGE UP
LYMPHOCYTES # BLD AUTO: 0.46 K/UL — LOW (ref 1.2–3.4)
LYMPHOCYTES # BLD AUTO: 10.2 % — LOW (ref 20.5–51.1)
LYMPHOCYTES # FLD: 9 — SIGNIFICANT CHANGE UP
MAGNESIUM SERPL-MCNC: 1.9 MG/DL — SIGNIFICANT CHANGE UP (ref 1.8–2.4)
MCHC RBC-ENTMCNC: 26.6 PG — LOW (ref 27–31)
MCHC RBC-ENTMCNC: 31.4 G/DL — LOW (ref 32–37)
MCV RBC AUTO: 84.6 FL — SIGNIFICANT CHANGE UP (ref 80–94)
MONOCYTES # BLD AUTO: 0.45 K/UL — SIGNIFICANT CHANGE UP (ref 0.1–0.6)
MONOCYTES NFR BLD AUTO: 10 % — HIGH (ref 1.7–9.3)
MONOS+MACROS # FLD: 73 % — SIGNIFICANT CHANGE UP
NEUTROPHILS # BLD AUTO: 3.38 K/UL — SIGNIFICANT CHANGE UP (ref 1.4–6.5)
NEUTROPHILS NFR BLD AUTO: 74.9 % — SIGNIFICANT CHANGE UP (ref 42.2–75.2)
NEUTROPHILS-BODY FLUID: 18 % — SIGNIFICANT CHANGE UP
NRBC # BLD: 0 /100 WBCS — SIGNIFICANT CHANGE UP (ref 0–0)
PHOSPHATE SERPL-MCNC: 2.8 MG/DL — SIGNIFICANT CHANGE UP (ref 2.1–4.9)
PLATELET # BLD AUTO: 173 K/UL — SIGNIFICANT CHANGE UP (ref 130–400)
PMV BLD: 10.8 FL — HIGH (ref 7.4–10.4)
POTASSIUM SERPL-MCNC: 4 MMOL/L — SIGNIFICANT CHANGE UP (ref 3.5–5)
POTASSIUM SERPL-SCNC: 4 MMOL/L — SIGNIFICANT CHANGE UP (ref 3.5–5)
PROT FLD-MCNC: 5.3 G/DL — SIGNIFICANT CHANGE UP
PROT SERPL-MCNC: 6.7 G/DL — SIGNIFICANT CHANGE UP (ref 6–8)
RBC # BLD: 3.31 M/UL — LOW (ref 4.7–6.1)
RBC # FLD: 17.2 % — HIGH (ref 11.5–14.5)
RCV VOL RI: 1000 /UL — HIGH (ref 0–0)
SODIUM SERPL-SCNC: 135 MMOL/L — SIGNIFICANT CHANGE UP (ref 135–146)
TOTAL NUCLEATED CELL COUNT, BODY FLUID: 282 /UL — SIGNIFICANT CHANGE UP
TUBE TYPE: SIGNIFICANT CHANGE UP
WBC # BLD: 4.51 K/UL — LOW (ref 4.8–10.8)
WBC # FLD AUTO: 4.51 K/UL — LOW (ref 4.8–10.8)

## 2024-09-25 PROCEDURE — 74021 RADEX ABDOMEN 3+ VIEWS: CPT | Mod: 26

## 2024-09-25 PROCEDURE — 88112 CYTOPATH CELL ENHANCE TECH: CPT | Mod: 26

## 2024-09-25 PROCEDURE — 49083 ABD PARACENTESIS W/IMAGING: CPT

## 2024-09-25 PROCEDURE — 88305 TISSUE EXAM BY PATHOLOGIST: CPT | Mod: 26

## 2024-09-25 PROCEDURE — 99231 SBSQ HOSP IP/OBS SF/LOW 25: CPT

## 2024-09-25 RX ORDER — IOHEXOL 350 MG/ML
30 INJECTION, SOLUTION INTRAVENOUS ONCE
Refills: 0 | Status: COMPLETED | OUTPATIENT
Start: 2024-09-25 | End: 2024-09-25

## 2024-09-25 RX ADMIN — Medication 60 MILLIGRAM(S): at 05:28

## 2024-09-25 RX ADMIN — FOLIC ACID 1 MILLIGRAM(S): 1 TABLET ORAL at 12:08

## 2024-09-25 RX ADMIN — Medication 0.4 MILLIGRAM(S): at 21:19

## 2024-09-25 RX ADMIN — Medication 25 MILLIGRAM(S): at 05:28

## 2024-09-25 RX ADMIN — FUROSEMIDE 40 MILLIGRAM(S): 10 INJECTION INTRAVENOUS at 05:28

## 2024-09-25 RX ADMIN — ATORVASTATIN CALCIUM 40 MILLIGRAM(S): 10 TABLET, FILM COATED ORAL at 21:19

## 2024-09-25 RX ADMIN — Medication 1000 MICROGRAM(S): at 12:08

## 2024-09-25 RX ADMIN — Medication 3 UNIT(S): at 08:19

## 2024-09-25 RX ADMIN — Medication 5000 UNIT(S): at 05:28

## 2024-09-25 RX ADMIN — Medication 75 MILLIGRAM(S): at 12:07

## 2024-09-25 RX ADMIN — Medication 5000 UNIT(S): at 21:20

## 2024-09-25 RX ADMIN — Medication 81 MILLIGRAM(S): at 12:07

## 2024-09-25 RX ADMIN — CHLORHEXIDINE GLUCONATE ORAL RINSE 1 APPLICATION(S): 1.2 SOLUTION DENTAL at 05:29

## 2024-09-25 RX ADMIN — IOHEXOL 30 MILLILITER(S): 350 INJECTION, SOLUTION INTRAVENOUS at 12:08

## 2024-09-25 RX ADMIN — PANTOPRAZOLE SODIUM 40 MILLIGRAM(S): 40 TABLET, DELAYED RELEASE ORAL at 05:29

## 2024-09-25 NOTE — PROGRESS NOTE ADULT - SUBJECTIVE AND OBJECTIVE BOX
Hepatology Follow Up Note      Location: [facility] 4B 012 B ([facility] 4B)  Patient Name: LAWRENCE SCHWABACHER  Age: 64y  Gender: Male      Chief Complaint  Patient is a 64y old Male who presents with a chief complaint of   Primary diagnosis of Abdominal pain        Reason for Consult  Ascites      Progress Note      Vital Signs in the last 24 hours           Physical Exam  * General Appearance: Alert, cooperative, interactive, oriented to time, place, and person, in no acute distress  * Eyes: PERRL, conjunctiva/corneas clear, EOM's intact, fundi benign, both eyes  * Lungs: Good bilateral air entry, audible crackles  * Heart: Regular Rate and Rhythm, normal S1 and S2, no audible murmur, rub, or gallop  * Abdomen: Symmetric, softly distended, non-tender, bowel sounds active all four quadrants, no masses  * Extremities: +1 lower extremity pitting edema bilaterally; wrapped      Investigations   Laboratory Workup      - CBC:                        9.3    3.92  )-----------( 171      ( 24 Sep 2024 06:56 )             29.5       - Hgb Trend:  9.3  09-24-24 @ 06:56  9.3  09-23-24 @ 14:10          - Chemistry:  09-24    137  |  94[L]  |  33[H]  ----------------------------<  95  3.7   |  31  |  5.2[HH]    Ca    7.9[L]      24 Sep 2024 06:56  Phos  3.2     09-24  Mg     1.9     09-24    TPro  6.5  /  Alb  2.8[L]  /  TBili  0.5  /  DBili  x   /  AST  6   /  ALT  <5  /  AlkPhos  152[H]  09-24    Liver panel trend:  TBili 0.5   /   AST 6   /   ALT <5   /   AlkP 152   /   Tptn 6.5   /   Alb 2.8    /   DBili --      09-24  TBili 0.6   /   AST 6   /   ALT <5   /   AlkP 167   /   Tptn 7.1   /   Alb 3.1    /   DBili --      09-23  TBili 0.6   /   AST 12   /   ALT <5   /   AlkP 152   /   Tptn 7.1   /   Alb 3.1    /   DBili <0.2      09-16      - Coagulation Studies:      - ABG:      - Cardiac Markers:        Microbiological Workup  Urinalysis Basic - ( 24 Sep 2024 06:56 )    Color: x / Appearance: x / SG: x / pH: x  Gluc: 95 mg/dL / Ketone: x  / Bili: x / Urobili: x   Blood: x / Protein: x / Nitrite: x   Leuk Esterase: x / RBC: x / WBC x   Sq Epi: x / Non Sq Epi: x / Bacteria: x          Radiological Workup            Current Medications  Standing Medications  aspirin  chewable 81 milliGRAM(s) Oral daily  atorvastatin 40 milliGRAM(s) Oral at bedtime  chlorhexidine 2% Cloths 1 Application(s) Topical <User Schedule>  clopidogrel Tablet 75 milliGRAM(s) Oral daily  cyanocobalamin 1000 MICROGram(s) Oral daily  dextrose 5%. 1000 milliLiter(s) (50 mL/Hr) IV Continuous <Continuous>  dextrose 5%. 1000 milliLiter(s) (100 mL/Hr) IV Continuous <Continuous>  dextrose 50% Injectable 25 Gram(s) IV Push once  dextrose 50% Injectable 12.5 Gram(s) IV Push once  dextrose 50% Injectable 25 Gram(s) IV Push once  folic acid 1 milliGRAM(s) Oral daily  furosemide    Tablet 40 milliGRAM(s) Oral daily  glucagon  Injectable 1 milliGRAM(s) IntraMuscular once  heparin   Injectable 5000 Unit(s) SubCutaneous every 8 hours  influenza  Vaccine (HIGH DOSE) 0.5 milliLiter(s) IntraMuscular once  insulin lispro Injectable (ADMELOG) 3 Unit(s) SubCutaneous before dinner  insulin lispro Injectable (ADMELOG) 3 Unit(s) SubCutaneous before breakfast  metoprolol succinate ER 25 milliGRAM(s) Oral daily  NIFEdipine XL 60 milliGRAM(s) Oral daily  pantoprazole    Tablet 40 milliGRAM(s) Oral before breakfast  senna 2 Tablet(s) Oral at bedtime  tamsulosin 0.4 milliGRAM(s) Oral at bedtime    PRN Medications  acetaminophen     Tablet .. 650 milliGRAM(s) Oral every 6 hours PRN Mild Pain (1 - 3)  artificial  tears Solution 1 Drop(s) Both EYES every 2 hours PRN Dry Eyes  dextrose Oral Gel 15 Gram(s) Oral once PRN Blood Glucose LESS THAN 70 milliGRAM(s)/deciliter  ondansetron Injectable 4 milliGRAM(s) IV Push every 6 hours PRN Nausea and/or Vomiting    Singles Doses Administered       Hepatology Follow Up Note      [facility]B [facility]12 B [facility]  Patient Name: LAWRENCE SCHWABACHER  Age: 64y  Gender: Male      Chief Complaint  Patient is a 64y old Male who presents with a chief complaint of   Primary diagnosis of Abdominal pain        Reason for Consult  Ascites      Progress Note  Patient is doing fine.  He mild abdominal discomfort.   He denies nausea or vomiting.  He had a BM on 09/24.        Vital Signs in the last 24 hours   Vital Signs Last 24 Hrs  T(C): 36.4 (09-25-24 @ 15:50), Max: 36.7 (09-25-24 @ 00:14)  T(F): 97.5 (09-25-24 @ 15:50), Max: 98.1 (09-25-24 @ 00:14)  HR: 78 (09-25-24 @ 15:50) (67 - 79)  BP: 146/68 (09-25-24 @ 15:50) (113/65 - 146/68)  BP(mean): --  RR: 20 (09-25-24 @ 15:50) (18 - 20)  SpO2: 97% (09-25-24 @ 15:50) (93% - 98%)        Physical Exam  * General Appearance: Alert, cooperative, interactive, oriented to time, place, and person, in no acute distress  * Eyes: PERRL, conjunctiva/corneas clear, EOM's intact, fundi benign, both eyes  * Lungs: Good bilateral air entry, audible crackles  * Heart: Regular Rate and Rhythm, normal S1 and S2, no audible murmur, rub, or gallop  * Abdomen: Symmetric, increased distention, soft, non-tender, bowel sounds active all four quadrants, no masses  * Extremities: +1 lower extremity pitting edema bilaterally; wrapped      Investigations                8.8    4.51  )-----------( 173      ( 25 Sep 2024 06:49 )             28.0     09-25    135  |  95[L]  |  24[H]  ----------------------------<  81  4.0   |  25  |  4.6[HH]    Ca    7.9[L]      25 Sep 2024 06:49  Phos  2.8     09-25  Mg     1.9     09-25    TPro  6.7  /  Alb  2.9[L]  /  TBili  0.4  /  DBili  x   /  AST  6   /  ALT  <5  /  AlkPhos  153[H]  09-25        LIVER FUNCTIONS - ( 25 Sep 2024 06:49 )  Alb: 2.9 g/dL / Pro: 6.7 g/dL / ALK PHOS: 153 U/L / ALT: <5 U/L / AST: 6 U/L / GGT: 23 U/L           Urinalysis Basic - ( 25 Sep 2024 06:49 )    Color: x / Appearance: x / SG: x / pH: x  Gluc: 81 mg/dL / Ketone: x  / Bili: x / Urobili: x   Blood: x / Protein: x / Nitrite: x   Leuk Esterase: x / RBC: x / WBC x   Sq Epi: x / Non Sq Epi: x / Bacteria: x        Current Medications  Standing Medications  aspirin  chewable 81 milliGRAM(s) Oral daily  atorvastatin 40 milliGRAM(s) Oral at bedtime  chlorhexidine 2% Cloths 1 Application(s) Topical <User Schedule>  clopidogrel Tablet 75 milliGRAM(s) Oral daily  cyanocobalamin 1000 MICROGram(s) Oral daily  dextrose 5%. 1000 milliLiter(s) (50 mL/Hr) IV Continuous <Continuous>  dextrose 5%. 1000 milliLiter(s) (100 mL/Hr) IV Continuous <Continuous>  dextrose 50% Injectable 25 Gram(s) IV Push once  dextrose 50% Injectable 12.5 Gram(s) IV Push once  dextrose 50% Injectable 25 Gram(s) IV Push once  folic acid 1 milliGRAM(s) Oral daily  furosemide    Tablet 40 milliGRAM(s) Oral daily  glucagon  Injectable 1 milliGRAM(s) IntraMuscular once  heparin   Injectable 5000 Unit(s) SubCutaneous every 8 hours  influenza  Vaccine (HIGH DOSE) 0.5 milliLiter(s) IntraMuscular once  insulin lispro Injectable (ADMELOG) 3 Unit(s) SubCutaneous before dinner  insulin lispro Injectable (ADMELOG) 3 Unit(s) SubCutaneous before breakfast  metoprolol succinate ER 25 milliGRAM(s) Oral daily  NIFEdipine XL 60 milliGRAM(s) Oral daily  pantoprazole    Tablet 40 milliGRAM(s) Oral before breakfast  senna 2 Tablet(s) Oral at bedtime  tamsulosin 0.4 milliGRAM(s) Oral at bedtime    PRN Medications  acetaminophen     Tablet .. 650 milliGRAM(s) Oral every 6 hours PRN Mild Pain (1 - 3)  artificial  tears Solution 1 Drop(s) Both EYES every 2 hours PRN Dry Eyes  dextrose Oral Gel 15 Gram(s) Oral once PRN Blood Glucose LESS THAN 70 milliGRAM(s)/deciliter  ondansetron Injectable 4 milliGRAM(s) IV Push every 6 hours PRN Nausea and/or Vomiting    Singles Doses Administered

## 2024-09-25 NOTE — PROGRESS NOTE ADULT - ASSESSMENT
Pt is a 64-year-old man with a past medical history of ESRD on hemodialysis MWF (last dialysis on 9/20/24), NIDDM, BPH,DM, CAD s/p  CABG, HTN, CHF, TIA, PAD, left toe ulcer s/p amputation sent in by PCP (Dr. Olivier) for increasing abdomen distention over the last 3 weeks.  next HD tomorrow  phos noted, no binders  BP controlled   d/c lasix if anuric   paracentesis pending / GI evaluation   h/h noted will resume [facility]SEY with HD / check iron stores   will follow

## 2024-09-25 NOTE — PROGRESS NOTE ADULT - ASSESSMENT
Assessment and Plan  Case of a 64 year old male patient known to have ESRD on HD, NIDDM, CAD s/p CABG, HFpEF, HTN, TIA, PAD s/p left toe ulcer requiring amputation, BPH, and large ascites since 03/2024 who was sent to  ED by PCP on 09/23 for worsening abdominal distention, found to have stable large ascites. Status post HD session 09/24. We are consulted in setting of large ascites.      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 and Chronically Elevated Cr despite HD   Rule Out Cardiac in Setting of CHF   No evidence of cirrhosis on imaging  Asymptomatic Cholelithiasis  * Incidentally found to have large ascites on CT 03/2024 during trauma workup post fall  * Takes PO lasix 40mg QD at home for CHF/ESRD  * No previous paracentesis  * No history of cirrhosis; drinks wine/gin every few months; last one was years ago  * Hemodynamically stable  * Labs: WBC 4.93, Hb 9.3, MCV 83.9, Plt 191, Na 135, K 4.1, BUN 31, Cr 5.1 09/23  * LFTs 0.6/167/6/5 on 09/23 (previous LFT 2022 0.3/178/27/31 -> 03/2024 0.3/224/20/14 -> 0.6/152/12/5 on 09/16/2024); albumin 3.1   * INR 1.21 on 09/16   * Total iron 39, S% 29%, TIBC 133, ferritin 1126 in 10/2022  * Acute hepatitis panel 2022 unremarkable  * No previous US abdomen   * Recent CT abdomen pelvis PO IC 09/16 normal liver, spleen, pancreas, cholelithiasis, mod-large right inguinal hernia, no significant change in large ascites; 3.6x2.8cm hematoma along lateral aspect of right gluteus  * No previous EGD; had colonoscopy with Dr Montenegro 2 years ago  * No FHx of liver diseases or GI cancers    RECOMMENDATIONS  - Trend LFTs  - Check iso-ALP and GGT  - Check US abdomen. Obtain diagnostic tap. IR team consulted by primary team  - Serial abdominal exams  - Monitor pain: on tylenol PRN  - Nephrology team on board for ESRD management: s/p HD session on 09/24  - Check TTE. Continue home dose of PO lasix 40mg QD. Discontinue aldacctone 25mg QD  - Monitor urine in/out (if still making urine). Check u/a and urine albumin/Cr ratio  - Monitor electrolytes and replete as indicated  - Avoid hepatotoxic agents      Chronic Anemia: ACD and Iron deficiency anemia  No overt GI bleeding  * Hb 8.9 on 09/16 -> 9.3 on 09/23; MCV 83.9 (previous Hb 7-8 in 03/2024)  * Total Fe 34, TIIBC 133, S% 29, ferritin 1126 in 10/2022  * INR 1.21 on 09/16  * No melena or hematochezia  * No previous EGD; had colonoscopy with Dr Montenegro 2 years ago  * No FHx of liver diseases or GI cancers    RECOMMENDATIONS  - Trend CBC and keep active T/S  - Nephrology team on board: on HD  - Continue folic acid and cyanocobalamin supplements  - Outpatient follow up with Dr Montenegro to ensure patient is uptodate with scopes        Thank you for your consult.  - Please note that plan was communicated with medical team.   - Please reach GI on [phone] during weekdays till 5pm.  - Please call the GI service line after 5pm on Weekdays and anytime on Weekends: [phone].      Albert Stanley MD  PGY - 5 Gastroenterology Fellow   [facility]

## 2024-09-25 NOTE — PROGRESS NOTE ADULT - ASSESSMENT
#ESRD on HD MWF  #HFmrEF-Last TTE in 2022  - Patient cannot have po intake without vomiting sips of food. Lying comfortably in bed. Vitals WNL. PE showed distended abdomen with clear lungs  -In the ED:   T: 36.5      HR: 68    BP: 122/61    Spo2: 95% on room air  -Alk Phos: 167  -CT abdomen/pelvis from 9/16/24:   1. Since March 28, 2024, no significant change in large volume abdominopelvic ascites..2. Moderate to large right inguinal hernia contains ascites fluid and a   short segment of nonobstructed cecum and distal ileum.3. Unchanged small left and trace right pleural effusions with small   partially imaged bibasilar opacities.4. A 3.6 cm hyperattenuating focus within the lateral aspect of the right gluteus muscle is suspicious for small hematoma; correlation with clinical history and exam is suggested.  - TTE from 2022:  Left ventricular ejection fraction, by visual estimation, is 45 to 50%. Grade III diastolic dysfunction with elevated left atrial and left ventricular end-diastolic pressures. Moderate mitral regurgitation  - Patient on home PO lasix 40mg, aldactone 25 mg  -  Procedure team consult for paracentesis is in.  -  f/u ascites lab work-up. Extensive work-up ordered  - f/u Pro-Bnp   - f/u TTE  - f/u am Phosphorus and PTH  - f/u with nephrology for HD. Last HD was on Friday  - f/u with GI   - Place patient on renal diet  - received paracentesis today 4 liters    #CAD s/p CABG  #HTN  #History of TIA  - On home Plavix 75 mg daily, Aspirin 81 mg  - On home metoprolol succinate 25 mg , Nifedipine 60 mg daily    #DM  #left toe ulcer s/p amputation   - On Humalog 5 units before breakfast and dinner  - Will decrease it to 3U given patient is not eating that much for now    #Chronic constipation  - On Miralax and senna 2 tabs at night    #BPH  - On flomax 0.4 mg     #Full code  #DVT prophylaxis: Heparin sub q

## 2024-09-25 NOTE — PROGRESS NOTE ADULT - SUBJECTIVE AND OBJECTIVE BOX
Chart reviewed, patient examined. Pertinent results reviewed.  Case discussed with HO; specialist f/u reviewed  HD#3  SUBJECTIVE:  HPI:  Pt is a 64-year-old man with a past medical history of ESRD on hemodialysis MWF (last dialysis on 9/20/24), NIDDM, BPH,DM, CAD s/p  CABG, HTN, CHF, TIA, PAD, left toe ulcer s/p amputation sent in by PCP (Dr. Olivier) for increasing abdomen distention over the last 3 weeks. Patient reported that he developed new onset abdominal distention over the past 3 weeks that is now causing him abdominal pain and difficulty having PO intake with N&V. He reported that he had to vomit small amounts of food if he eats. He also reported being constipated over the past 2 days. He denied any hematemesis, sob, chest pain, orthopnea, blood in stools, swelling of the legs, fever or chills, He is aneuric and does not produce urine. He denied any history of smoking or excessive alcohol use. Patient presented to the ED on 9/16 and underwent a CT, he was discharged on the same day without further work-up. Patient is currently in [facility]    ED VS reviewed    CT abdomen/pelvis from 9/16/24:   1. Since March 28, 2024, no significant change in large volume abdominopelvic ascites..2. Moderate to large right inguinal hernia contains ascites fluid and a   short segment of nonobstructed cecum and distal ileum.3. Unchanged small left and trace right pleural effusions with small   partially imaged bibasilar opacities.4. A 3.6 cm hyperattenuating focus within the lateral aspect of the right gluteus muscle is suspicious for small hematoma; correlation with clinical history and exam is suggested.           PAST MEDICAL & SURGICAL HISTORY  S/P CABG (coronary artery bypass graft)  x4    Diabetes mellitus    Transient ischemic attack (TIA)  2017; 2008    Chronic kidney disease (CKD)  Stage IV- on HD about 6 years; makes min urine    Hypertension    Stented coronary artery  in 2008    Myocardial infarction  2012    PAD (peripheral artery disease)  S/p bypass left leg    HLD (hyperlipidemia)    BPH (benign prostatic hyperplasia)    Pain in left knee  s/p fall    OA (osteoarthritis)    [place] (hard of hearing)    Chronic anemia    History of medical problems  left arm precaution    S/P CABG (coronary artery bypass graft)  2012    H/O arterial bypass of lower limb  Left Lower Extremity (2016)    History of surgery  Left CEA (2017)  Left Pinkie toe Amputation (2014)  CABG x 4 (2012)  Card cath - stent (2008)      AV fistula  2019  LEFT AV FISTULA  SH: living @ SNF > 1 year;   ROS: as in HPI; F/U w POD; Min walking at SNF; no Fever or chills or bleeding  Interval: Seen by GI and hepatology. Still w repeated N&V; planned for and had Paracentesis in IR today- 4000Ml.    ALLERGIES:  No Known Allergies    MEDICATIONS:  ACTIVE MEDICATIONS  acetaminophen     Tablet .. 650 milliGRAM(s) Oral every 6 hours PRN  artificial  tears Solution 1 Drop(s) Both EYES every 2 hours PRN  aspirin  chewable 81 milliGRAM(s) Oral daily  atorvastatin 40 milliGRAM(s) Oral at bedtime  chlorhexidine 2% Cloths 1 Application(s) Topical <User Schedule>  clopidogrel Tablet 75 milliGRAM(s) Oral daily  cyanocobalamin 1000 MICROGram(s) Oral daily  dextrose 5%. 1000 milliLiter(s) IV Continuous <Continuous>  dextrose 5%. 1000 milliLiter(s) IV Continuous <Continuous>  dextrose 50% Injectable 25 Gram(s) IV Push once  dextrose 50% Injectable 12.5 Gram(s) IV Push once  dextrose 50% Injectable 25 Gram(s) IV Push once  dextrose Oral Gel 15 Gram(s) Oral once PRN  folic acid 1 milliGRAM(s) Oral daily  furosemide    Tablet 40 milliGRAM(s) Oral daily  glucagon  Injectable 1 milliGRAM(s) IntraMuscular once  heparin   Injectable 5000 Unit(s) SubCutaneous every 8 hours  influenza  Vaccine (HIGH DOSE) 0.5 milliLiter(s) IntraMuscular once  insulin lispro Injectable (ADMELOG) 3 Unit(s) SubCutaneous before dinner  insulin lispro Injectable (ADMELOG) 3 Unit(s) SubCutaneous before breakfast  metoprolol succinate ER 25 milliGRAM(s) Oral daily  NIFEdipine XL 60 milliGRAM(s) Oral daily  ondansetron Injectable 4 milliGRAM(s) IV Push every 6 hours PRN  pantoprazole    Tablet 40 milliGRAM(s) Oral before breakfast  senna 2 Tablet(s) Oral at bedtime  tamsulosin 0.4 milliGRAM(s) Oral at bedtime      VITALS:   T(F): 97.6  HR: 79  BP: 113/65  RR: 18  SpO2: 93%  Vital Signs Last 24 Hrs  T(C): 36.4 (25 Sep 2024 15:50), Max: 36.7 (25 Sep 2024 00:14)  T(F): 97.5 (25 Sep 2024 15:50), Max: 98.1 (25 Sep 2024 00:14)  HR: 78 (25 Sep 2024 15:50) (72 - 79)  BP: 146/68 (25 Sep 2024 15:50) (113/65 - 146/68)  BP(mean): --  RR: 20 (25 Sep 2024 15:50) (18 - 20)  SpO2: 97% (25 Sep 2024 15:50) (93% - 97%)    Parameters below as of 25 Sep 2024 15:50  Patient On (Oxygen Delivery Method): room air        LABS:                             8.8    4.51  )-----------( 173      ( 25 Sep 2024 06:49 )             28.0     09-25    135  |  95[L]  |  24[H]  ----------------------------<  81  4.0   |  25  |  4.6[HH]    Ca    7.9[L]      25 Sep 2024 06:49  Phos  2.8     09-25  Mg     1.9     09-25    TPro  6.7  /  Alb  2.9[L]  /  TBili  0.4  /  DBili  x   /  AST  6   /  ALT  <5  /  AlkPhos  153[H]  09-25      Urinalysis Basic - ( 25 Sep 2024 06:49 )    Color: x / Appearance: x / SG: x / pH: x  Gluc: 81 mg/dL / Ketone: x  / Bili: x / Urobili: x   Blood: x / Protein: x / Nitrite: x   Leuk Esterase: x / RBC: x / WBC x   Sq Epi: x / Non Sq Epi: x / Bacteria: x                    PHYSICAL EXAM:  GEN: No acute distress; NETTA; pale; supine in bed; AAOx4  H: AT/NC; ; TH: MMM; fair dentition  LUNGS: Clear to auscultation  HT: sternotomy scar; RRR, no MRG   ABD:  +3 distended- NT; relatively soft- no HSM or Mass; + UMB hernia  EXT: No pedal edema- legs wrapped w UNNA boots.  NEURO: AAOX4; Gen weak; legs mi-mod weak and atrophic

## 2024-09-25 NOTE — PROGRESS NOTE ADULT - SUBJECTIVE AND OBJECTIVE BOX
SUBJECTIVE:  HPI:  Pt is a 64-year-old man with a past medical history of ESRD on hemodialysis MWF (last dialysis on 9/20/24), NIDDM, BPH,DM, CAD s/p  CABG, HTN, CHF, TIA, PAD, left toe ulcer s/p amputation sent in by PCP (Dr. Olivier) for increasing abdomen distention over the last 3 weeks. Patient reported that he developed new onset abdominal distention over the past 3 weeks that is now causing him abdominal pain and difficulty having PO intake with vomiting. He reported that he had to vomit small amounts of food if he eats. He also reported being constipated over the past 2 days. He denied any hematemesis, sob, chest pain, orthopnea, blood in stools, swelling of the legs, fever or chills, He is aneuric and does not produce urine. He denied any history of smoking or excessive alcohol use. Patient presented to the ED on 9/16 and underwent a C, he was discharged on the same day without further work-up. Patient is currently in [facility]    In the ED:   T: 36.5      HR: 68    BP: 122/61    Spo2: 95% on room air    CT abdomen/pelvis from 9/16/24:   1. Since March 28, 2024, no significant change in large volume abdominopelvic ascites..2. Moderate to large right inguinal hernia contains ascites fluid and a   short segment of nonobstructed cecum and distal ileum.3. Unchanged small left and trace right pleural effusions with small   partially imaged bibasilar opacities.4. A 3.6 cm hyperattenuating focus within the lateral aspect of the right gluteus muscle is suspicious for small hematoma; correlation with clinical history and exam is suggested.           (23 Sep 2024 17:34)      Patient is a 64y old Male who presents with a chief complaint of   Currently admitted to medicine with the primary diagnosis of Abdominal distention       Today is hospital day 2d.     PAST MEDICAL & SURGICAL HISTORY  S/P CABG (coronary artery bypass graft)  x4    Diabetes mellitus    Transient ischemic attack (TIA)  2017; 2008    Chronic kidney disease (CKD)  Stage IV    Hypertension    Stented coronary artery  in 2008    Myocardial infarction  2012    PAD (peripheral artery disease)  S/p bypass left leg    HLD (hyperlipidemia)    BPH (benign prostatic hyperplasia)    Pain in left knee  s/p fall    OA (osteoarthritis)    [place] (hard of hearing)    Chronic anemia    History of medical problems  left arm precaution    S/P CABG (coronary artery bypass graft)  2012    H/O arterial bypass of lower limb  Left Lower Extremity (2016)    History of surgery  Left CEA (2017)  Left Pinkie toe Amputation (2014)  CABG x 4 (2012)  Card cath - stent (2008)      AV fistula  2019  LEFT AV FISTULA        ALLERGIES:  No Known Allergies    MEDICATIONS:  ACTIVE MEDICATIONS  acetaminophen     Tablet .. 650 milliGRAM(s) Oral every 6 hours PRN  artificial  tears Solution 1 Drop(s) Both EYES every 2 hours PRN  aspirin  chewable 81 milliGRAM(s) Oral daily  atorvastatin 40 milliGRAM(s) Oral at bedtime  chlorhexidine 2% Cloths 1 Application(s) Topical <User Schedule>  clopidogrel Tablet 75 milliGRAM(s) Oral daily  cyanocobalamin 1000 MICROGram(s) Oral daily  dextrose 5%. 1000 milliLiter(s) IV Continuous <Continuous>  dextrose 5%. 1000 milliLiter(s) IV Continuous <Continuous>  dextrose 50% Injectable 25 Gram(s) IV Push once  dextrose 50% Injectable 12.5 Gram(s) IV Push once  dextrose 50% Injectable 25 Gram(s) IV Push once  dextrose Oral Gel 15 Gram(s) Oral once PRN  folic acid 1 milliGRAM(s) Oral daily  furosemide    Tablet 40 milliGRAM(s) Oral daily  glucagon  Injectable 1 milliGRAM(s) IntraMuscular once  heparin   Injectable 5000 Unit(s) SubCutaneous every 8 hours  influenza  Vaccine (HIGH DOSE) 0.5 milliLiter(s) IntraMuscular once  insulin lispro Injectable (ADMELOG) 3 Unit(s) SubCutaneous before dinner  insulin lispro Injectable (ADMELOG) 3 Unit(s) SubCutaneous before breakfast  metoprolol succinate ER 25 milliGRAM(s) Oral daily  NIFEdipine XL 60 milliGRAM(s) Oral daily  ondansetron Injectable 4 milliGRAM(s) IV Push every 6 hours PRN  pantoprazole    Tablet 40 milliGRAM(s) Oral before breakfast  senna 2 Tablet(s) Oral at bedtime  tamsulosin 0.4 milliGRAM(s) Oral at bedtime      VITALS:   T(F): 97.6  HR: 79  BP: 113/65  RR: 18  SpO2: 93%    LABS:                        8.8    4.51  )-----------( 173      ( 25 Sep 2024 06:49 )             28.0     09-25    135  |  95[L]  |  24[H]  ----------------------------<  81  4.0   |  25  |  4.6[HH]    Ca    7.9[L]      25 Sep 2024 06:49  Phos  2.8     09-25  Mg     1.9     09-25    TPro  6.7  /  Alb  2.9[L]  /  TBili  0.4  /  DBili  x   /  AST  6   /  ALT  <5  /  AlkPhos  153[H]  09-25      Urinalysis Basic - ( 25 Sep 2024 06:49 )    Color: x / Appearance: x / SG: x / pH: x  Gluc: 81 mg/dL / Ketone: x  / Bili: x / Urobili: x   Blood: x / Protein: x / Nitrite: x   Leuk Esterase: x / RBC: x / WBC x   Sq Epi: x / Non Sq Epi: x / Bacteria: x                    PHYSICAL EXAM:  GEN: No acute distress  LUNGS: Clear to auscultation bilaterally   HEART: S1/S2 present.    ABD: Soft, non-tender, non-distended.   EXT: No pedal edema  NEURO: AAOX3

## 2024-09-25 NOTE — PROGRESS NOTE ADULT - TIME BILLING
spending 4  minutes on this patient's case:  14  minutes w patient,  15  minutes reviewing the chart,    and  16  minutes speaking to the HO& RN , also coordinating care and documenting all.  Patient and case are new to me.

## 2024-09-25 NOTE — PROGRESS NOTE ADULT - ASSESSMENT
#ESRD on HD MWF  #HFmrEF-Last TTE in 2022  - Patient cannot have po intake without vomiting sips of food. Lying comfortably in bed. Vitals WNL. PE showed distended abdomen with clear lungs  -In the ED:   T: 36.5      HR: 68    BP: 122/61    Spo2: 95% on room air  -Alk Phos: 167  -CT abdomen/pelvis from 9/16/24:   1. Since March 28, 2024, no significant change in large volume abdominopelvic ascites..2. Moderate to large right inguinal hernia contains ascites fluid and a   short segment of nonobstructed cecum and distal ileum.3. Unchanged small left and trace right pleural effusions with small   partially imaged bibasilar opacities.4. A 3.6 cm hyperattenuating focus within the lateral aspect of the right gluteus muscle is suspicious for small hematoma; correlation with clinical history and exam is suggested.  - TTE from 2022:  Left ventricular ejection fraction, by visual estimation, is 45 to 50%. Grade III diastolic dysfunction with elevated left atrial and left ventricular end-diastolic pressures. Moderate mitral regurgitation  - Patient on home PO lasix 40mg, aldactone 25 mg  -  Procedure team consult for paracentesis is in.  -  f/u ascites lab work-up. Extensive work-up ordered  - f/u Pro-Bnp   - f/u TTE  - f/u am Phosphorus and PTH  - f/u with nephrology for HD. Last HD was on Friday  - f/u with GI - see notes: tap and test fluid; INCREASE DIALYSIS????- RENAL TO ADVISE ALSO- OP HD vs HOME DIALYSIS        SNF TECHNIQUE  - Place patient on renal diet; CLEAR LIQUIDS 1ST         see abd XRAy- ? CONTRAST- FROM??; CT 9/16 SAYS NO PO CONTRAST  - TRY GASTROGRAFFIN STUDY- W ABD XRAYS- & REVW gi/heop  - received paracentesis today 4 liters    #CAD s/p CABG;BNP HIGH, BUT WAS HIGHER LAST ADMIT  #HTN  #History of TIA  - On home Plavix 75 mg daily, Aspirin 81 mg  - On home metoprolol succinate 25 mg , Nifedipine 60 mg daily    #DM  #left toe ulcer s/p amputation   - On Humalog 5 units before breakfast and dinner  - Will decrease it to 3U given patient is not eating that much for now    #Chronic constipation  - On Miralax and senna 2 tabs at night    #BPH  - On flomax 0.4 mg     #Full code  #DVT prophylaxis: Heparin sub q

## 2024-09-25 NOTE — PROGRESS NOTE ADULT - SUBJECTIVE AND OBJECTIVE BOX
Nephrology Progress Note    SCHWABACHER, LAWRENCE  [id number]  64y  Male    S:  Patient is seen and examined, events over the last 24h noted.    O:  Allergies:  No Known Allergies    Hospital Medications:   MEDICATIONS  (STANDING):  aspirin  chewable 81 milliGRAM(s) Oral daily  atorvastatin 40 milliGRAM(s) Oral at bedtime  chlorhexidine 2% Cloths 1 Application(s) Topical <User Schedule>  clopidogrel Tablet 75 milliGRAM(s) Oral daily  cyanocobalamin 1000 MICROGram(s) Oral daily  folic acid 1 milliGRAM(s) Oral daily  furosemide    Tablet 40 milliGRAM(s) Oral daily  heparin   Injectable 5000 Unit(s) SubCutaneous every 8 hours  influenza  Vaccine (HIGH DOSE) 0.5 milliLiter(s) IntraMuscular once  insulin lispro Injectable (ADMELOG) 3 Unit(s) SubCutaneous before breakfast  insulin lispro Injectable (ADMELOG) 3 Unit(s) SubCutaneous before dinner  metoprolol succinate ER 25 milliGRAM(s) Oral daily  NIFEdipine XL 60 milliGRAM(s) Oral daily  pantoprazole    Tablet 40 milliGRAM(s) Oral before breakfast  senna 2 Tablet(s) Oral at bedtime  tamsulosin 0.4 milliGRAM(s) Oral at bedtime    MEDICATIONS  (PRN):  acetaminophen     Tablet .. 650 milliGRAM(s) Oral every 6 hours PRN Mild Pain (1 - 3)  artificial  tears Solution 1 Drop(s) Both EYES every 2 hours PRN Dry Eyes  dextrose Oral Gel 15 Gram(s) Oral once PRN Blood Glucose LESS THAN 70 milliGRAM(s)/deciliter  ondansetron Injectable 4 milliGRAM(s) IV Push every 6 hours PRN Nausea and/or Vomiting    Home Medications:  Aldactone 25 mg oral tablet: 1 tab(s) orally once a day (23 Sep 2024 17:27)  aspirin 81 mg oral tablet: 1 tab(s) orally once a day (08 May 2024 09:32)  furosemide 40 mg oral tablet: 1 tab(s) orally once a day (08 May 2024 09:32)  HumaLOG 100 units/mL injectable solution: 5 international unit(s) injectable 2 times a day (with meals) (08 May 2024 09:32)  NIFEdipine (Eqv-Procardia XL) 60 mg oral tablet, extended release: 1 tab(s) orally once a day (08 May 2024 09:23)  ocular lubricant ophthalmic solution: 3 drop(s) to each affected eye every 2 hours, As needed, Dry Eyes (08 May 2024 09:32)  pantoprazole 40 mg oral delayed release tablet: 1 tab(s) orally once a day (08 May 2024 09:16)  Plavix 75 mg oral tablet: 1 tab(s) orally once a day (08 May 2024 09:32)  senna (sennosides) 8.6 mg oral tablet: 2 tab(s) orally once a day (at bedtime) (23 Sep 2024 17:27)  tamsulosin 0.4 mg oral capsule: 1 cap(s) orally once a day (at bedtime) (08 May 2024 09:32)  Tylenol 325 mg oral tablet: 2 tab(s) orally every 6 hours, As Needed (08 May 2024 09:32)      VITALS:  Daily     Daily   T(F): 97.5 (09-25-24 @ 15:50), Max: 98.1 (09-25-24 @ 00:14)  HR: 78 (09-25-24 @ 15:50)  BP: 146/68 (09-25-24 @ 15:50)  RR: 20 (09-25-24 @ 15:50)  SpO2: 97% (09-25-24 @ 15:50)  Wt(kg): --  I&O's Detail    24 Sep 2024 07:01  -  25 Sep 2024 07:00  --------------------------------------------------------  IN:  Total IN: 0 mL    OUT:    Other (mL): 2000 mL  Total OUT: 2000 mL    Total NET: -2000 mL        I&O's Summary    24 Sep 2024 07:01  -  25 Sep 2024 07:00  --------------------------------------------------------  IN: 0 mL / OUT: 2000 mL / NET: -2000 mL          PHYSICAL EXAM:  Gen: NAD, ill appearing  Chest: b/l breath sounds  Abd: soft  Extremities: LEs bandaged  Vascular access: AVF      LABS:      09-25    135  |  95[L]  |  24[H]  ----------------------------<  81  4.0   |  25  |  4.6[HH]    Ca    7.9[L]      25 Sep 2024 06:49  Phos  2.8     09-25  Mg     1.9     09-25    TPro  6.7  /  Alb  2.9[L]  /  TBili  0.4  /  DBili      /  AST  6   /  ALT  <5  /  AlkPhos  153[H]  09-25    Phosphorus: 2.8 mg/dL (09-25-24 @ 06:49)  Phosphorus: 3.2 mg/dL (09-24-24 @ 06:56)    Vitamin D, 25-Hydroxy: 15 ng/mL (09-25-24 @ 06:49)  Intact PTH: 135 pg/mL (09-24-24 @ 06:56)                          8.8    4.51  )-----------( 173      ( 25 Sep 2024 06:49 )             28.0     Mean Cell Volume: 84.6 fL (09-25-24 @ 06:49)

## 2024-09-26 LAB
ALBUMIN SERPL ELPH-MCNC: 2.6 G/DL — LOW (ref 3.5–5.2)
ALP SERPL-CCNC: 151 U/L — HIGH (ref 30–115)
ALT FLD-CCNC: <5 U/L — SIGNIFICANT CHANGE UP (ref 0–41)
ANION GAP SERPL CALC-SCNC: 11 MMOL/L — SIGNIFICANT CHANGE UP (ref 7–14)
AST SERPL-CCNC: 6 U/L — SIGNIFICANT CHANGE UP (ref 0–41)
BASOPHILS # BLD AUTO: 0.06 K/UL — SIGNIFICANT CHANGE UP (ref 0–0.2)
BASOPHILS NFR BLD AUTO: 1.4 % — HIGH (ref 0–1)
BILIRUB SERPL-MCNC: 0.4 MG/DL — SIGNIFICANT CHANGE UP (ref 0.2–1.2)
BUN SERPL-MCNC: 29 MG/DL — HIGH (ref 10–20)
CALCIUM SERPL-MCNC: 7.5 MG/DL — LOW (ref 8.4–10.5)
CHLORIDE SERPL-SCNC: 95 MMOL/L — LOW (ref 98–110)
CO2 SERPL-SCNC: 30 MMOL/L — SIGNIFICANT CHANGE UP (ref 17–32)
CREAT SERPL-MCNC: 5.3 MG/DL — CRITICAL HIGH (ref 0.7–1.5)
EGFR: 11 ML/MIN/1.73M2 — LOW
EOSINOPHIL # BLD AUTO: 0.14 K/UL — SIGNIFICANT CHANGE UP (ref 0–0.7)
EOSINOPHIL NFR BLD AUTO: 3.2 % — SIGNIFICANT CHANGE UP (ref 0–8)
GLUCOSE BLDC GLUCOMTR-MCNC: 129 MG/DL — HIGH (ref 70–99)
GLUCOSE BLDC GLUCOMTR-MCNC: 153 MG/DL — HIGH (ref 70–99)
GLUCOSE BLDC GLUCOMTR-MCNC: 163 MG/DL — HIGH (ref 70–99)
GLUCOSE BLDC GLUCOMTR-MCNC: 172 MG/DL — HIGH (ref 70–99)
GLUCOSE SERPL-MCNC: 130 MG/DL — HIGH (ref 70–99)
GRAM STN FLD: SIGNIFICANT CHANGE UP
HCT VFR BLD CALC: 27 % — LOW (ref 42–52)
HGB BLD-MCNC: 8.4 G/DL — LOW (ref 14–18)
IMM GRANULOCYTES NFR BLD AUTO: 0.2 % — SIGNIFICANT CHANGE UP (ref 0.1–0.3)
LYMPHOCYTES # BLD AUTO: 0.46 K/UL — LOW (ref 1.2–3.4)
LYMPHOCYTES # BLD AUTO: 10.6 % — LOW (ref 20.5–51.1)
MAGNESIUM SERPL-MCNC: 1.9 MG/DL — SIGNIFICANT CHANGE UP (ref 1.8–2.4)
MCHC RBC-ENTMCNC: 26.5 PG — LOW (ref 27–31)
MCHC RBC-ENTMCNC: 31.1 G/DL — LOW (ref 32–37)
MCV RBC AUTO: 85.2 FL — SIGNIFICANT CHANGE UP (ref 80–94)
MONOCYTES # BLD AUTO: 0.47 K/UL — SIGNIFICANT CHANGE UP (ref 0.1–0.6)
MONOCYTES NFR BLD AUTO: 10.8 % — HIGH (ref 1.7–9.3)
NEUTROPHILS # BLD AUTO: 3.21 K/UL — SIGNIFICANT CHANGE UP (ref 1.4–6.5)
NEUTROPHILS NFR BLD AUTO: 73.8 % — SIGNIFICANT CHANGE UP (ref 42.2–75.2)
NRBC # BLD: 0 /100 WBCS — SIGNIFICANT CHANGE UP (ref 0–0)
PHOSPHATE SERPL-MCNC: 2.7 MG/DL — SIGNIFICANT CHANGE UP (ref 2.1–4.9)
PLATELET # BLD AUTO: 200 K/UL — SIGNIFICANT CHANGE UP (ref 130–400)
PMV BLD: 11.5 FL — HIGH (ref 7.4–10.4)
POTASSIUM SERPL-MCNC: 4.2 MMOL/L — SIGNIFICANT CHANGE UP (ref 3.5–5)
POTASSIUM SERPL-SCNC: 4.2 MMOL/L — SIGNIFICANT CHANGE UP (ref 3.5–5)
PROT SERPL-MCNC: 6.2 G/DL — SIGNIFICANT CHANGE UP (ref 6–8)
RBC # BLD: 3.17 M/UL — LOW (ref 4.7–6.1)
RBC # FLD: 17.3 % — HIGH (ref 11.5–14.5)
SODIUM SERPL-SCNC: 136 MMOL/L — SIGNIFICANT CHANGE UP (ref 135–146)
SPECIMEN SOURCE: SIGNIFICANT CHANGE UP
WBC # BLD: 4.35 K/UL — LOW (ref 4.8–10.8)
WBC # FLD AUTO: 4.35 K/UL — LOW (ref 4.8–10.8)

## 2024-09-26 PROCEDURE — 99232 SBSQ HOSP IP/OBS MODERATE 35: CPT

## 2024-09-26 RX ADMIN — Medication 0.4 MILLIGRAM(S): at 21:52

## 2024-09-26 RX ADMIN — Medication 650 MILLIGRAM(S): at 03:40

## 2024-09-26 RX ADMIN — Medication 650 MILLIGRAM(S): at 13:22

## 2024-09-26 RX ADMIN — Medication 650 MILLIGRAM(S): at 04:40

## 2024-09-26 RX ADMIN — Medication 25 MILLIGRAM(S): at 06:04

## 2024-09-26 RX ADMIN — Medication 5000 UNIT(S): at 12:54

## 2024-09-26 RX ADMIN — Medication 650 MILLIGRAM(S): at 12:52

## 2024-09-26 RX ADMIN — ATORVASTATIN CALCIUM 40 MILLIGRAM(S): 10 TABLET, FILM COATED ORAL at 21:53

## 2024-09-26 RX ADMIN — Medication 5000 UNIT(S): at 06:03

## 2024-09-26 RX ADMIN — FOLIC ACID 1 MILLIGRAM(S): 1 TABLET ORAL at 12:43

## 2024-09-26 RX ADMIN — Medication 3 UNIT(S): at 08:05

## 2024-09-26 RX ADMIN — Medication 5000 UNIT(S): at 21:52

## 2024-09-26 RX ADMIN — FUROSEMIDE 40 MILLIGRAM(S): 10 INJECTION INTRAVENOUS at 06:04

## 2024-09-26 RX ADMIN — Medication 1000 MICROGRAM(S): at 12:43

## 2024-09-26 RX ADMIN — Medication 60 MILLIGRAM(S): at 06:04

## 2024-09-26 RX ADMIN — Medication 3 UNIT(S): at 16:53

## 2024-09-26 RX ADMIN — Medication 75 MILLIGRAM(S): at 12:42

## 2024-09-26 RX ADMIN — CHLORHEXIDINE GLUCONATE ORAL RINSE 1 APPLICATION(S): 1.2 SOLUTION DENTAL at 06:04

## 2024-09-26 RX ADMIN — Medication 81 MILLIGRAM(S): at 12:42

## 2024-09-26 RX ADMIN — PANTOPRAZOLE SODIUM 40 MILLIGRAM(S): 40 TABLET, DELAYED RELEASE ORAL at 06:04

## 2024-09-26 NOTE — PROGRESS NOTE ADULT - SUBJECTIVE AND OBJECTIVE BOX
seen and examined  24 h events noted       PAST HISTORY  --------------------------------------------------------------------------------  No significant changes to PMH, PSH, FHx, SHx, unless otherwise noted    ALLERGIES & MEDICATIONS  --------------------------------------------------------------------------------  Allergies    No Known Allergies    Intolerances      Standing Inpatient Medications  aspirin  chewable 81 milliGRAM(s) Oral daily  atorvastatin 40 milliGRAM(s) Oral at bedtime  chlorhexidine 2% Cloths 1 Application(s) Topical <User Schedule>  clopidogrel Tablet 75 milliGRAM(s) Oral daily  cyanocobalamin 1000 MICROGram(s) Oral daily  dextrose 5%. 1000 milliLiter(s) IV Continuous <Continuous>  dextrose 5%. 1000 milliLiter(s) IV Continuous <Continuous>  dextrose 50% Injectable 25 Gram(s) IV Push once  dextrose 50% Injectable 12.5 Gram(s) IV Push once  dextrose 50% Injectable 25 Gram(s) IV Push once  folic acid 1 milliGRAM(s) Oral daily  furosemide    Tablet 40 milliGRAM(s) Oral daily  glucagon  Injectable 1 milliGRAM(s) IntraMuscular once  heparin   Injectable 5000 Unit(s) SubCutaneous every 8 hours  influenza  Vaccine (HIGH DOSE) 0.5 milliLiter(s) IntraMuscular once  insulin lispro Injectable (ADMELOG) 3 Unit(s) SubCutaneous before breakfast  insulin lispro Injectable (ADMELOG) 3 Unit(s) SubCutaneous before dinner  metoprolol succinate ER 25 milliGRAM(s) Oral daily  NIFEdipine XL 60 milliGRAM(s) Oral daily  pantoprazole    Tablet 40 milliGRAM(s) Oral before breakfast  senna 2 Tablet(s) Oral at bedtime  tamsulosin 0.4 milliGRAM(s) Oral at bedtime    PRN Inpatient Medications  acetaminophen     Tablet .. 650 milliGRAM(s) Oral every 6 hours PRN  artificial  tears Solution 1 Drop(s) Both EYES every 2 hours PRN  dextrose Oral Gel 15 Gram(s) Oral once PRN  ondansetron Injectable 4 milliGRAM(s) IV Push every 6 hours PRN          VITALS/PHYSICAL EXAM  --------------------------------------------------------------------------------  T(C): 36.5 (09-26-24 @ 07:21), Max: 36.6 (09-26-24 @ 00:01)  HR: 60 (09-26-24 @ 08:50) (60 - 78)  BP: 112/53 (09-26-24 @ 08:50) (112/53 - 146/68)  RR: 18 (09-26-24 @ 08:50) (18 - 20)  SpO2: 97% (09-26-24 @ 08:50) (91% - 97%)  Wt(kg): --        Physical Exam:  	Gen: NAD,  	Pulm: CTA B/L  	CV:  S1S2; no rub  	Abd: +distended  	LE: no edema  	Vascular access:av    LABS/STUDIES  --------------------------------------------------------------------------------              8.8    4.51  >-----------<  173      [09-25-24 @ 06:49]              28.0     135  |  95  |  24  ----------------------------<  81      [09-25-24 @ 06:49]  4.0   |  25  |  4.6        Ca     7.9     [09-25-24 @ 06:49]      Mg     1.9     [09-25-24 @ 06:49]      Phos  2.8     [09-25-24 @ 06:49]    TPro  6.7  /  Alb  2.9  /  TBili  0.4  /  DBili  x   /  AST  6   /  ALT  <5  /  AlkPhos  153  [09-25-24 @ 06:49]      Creatinine Trend:  SCr 4.6 [09-25 @ 06:49]  SCr 5.2 [09-24 @ 06:56]  SCr 5.1 [09-23 @ 14:10]  SCr 3.4 [09-16 @ 15:50]    Urinalysis - [09-25-24 @ 06:49]      Color  / Appearance  / SG  / pH       Gluc 81 / Ketone   / Bili  / Urobili        Blood  / Protein  / Leuk Est  / Nitrite       RBC  / WBC  / Hyaline  / Gran  / Sq Epi  / Non Sq Epi  / Bacteria       PTH -- (Ca 7.8)      [09-24-24 @ 06:56]   135  Vitamin D (25OH) 15      [09-25-24 @ 06:49]  HbA1c 5.8      [01-30-20 @ 06:46]

## 2024-09-26 NOTE — PROGRESS NOTE ADULT - ASSESSMENT
Assessment and Plan  Case of a 64 year old male patient known to have ESRD on HD, NIDDM, CAD s/p CABG, HFpEF, HTN, TIA, PAD s/p left toe ulcer requiring amputation, BPH, and large ascites since 03/2024 who was sent to  ED by PCP on 09/23 for worsening abdominal distention, found to have stable large ascites. Status post HD session 09/24. We are consulted in setting of large ascites.      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 and Chronically Elevated Cr despite HD   Rule Out Cardiac in Setting of CHF   No evidence of cirrhosis on imaging  Asymptomatic Cholelithiasis  * Incidentally found to have large ascites on CT 03/2024 during trauma workup post fall  * Takes PO lasix 40mg QD at home for CHF/ESRD  * No previous paracentesis  * No history of cirrhosis; drinks wine/gin every few months; last one was years ago  * Hemodynamically stable  * Labs: WBC 4.93, Hb 9.3, MCV 83.9, Plt 191, Na 135, K 4.1, BUN 31, Cr 5.1 09/23  * LFTs 0.6/167/6/5 on 09/23 (previous LFT 2022 0.3/178/27/31 -> 03/2024 0.3/224/20/14 -> 0.6/152/12/5 on 09/16/2024); albumin 3.1   * INR 1.21 on 09/16   * Total iron 39, S% 29%, TIBC 133, ferritin 1126 in 10/2022  * Acute hepatitis panel 2022 unremarkable  * No previous US abdomen   * Recent CT abdomen pelvis PO IC 09/16 normal liver, spleen, pancreas, cholelithiasis, mod-large right inguinal hernia, no significant change in large ascites; 3.6x2.8cm hematoma along lateral aspect of right gluteus  * No previous EGD; had colonoscopy with Dr Montenegro 2 years ago  * No FHx of liver diseases or GI cancers  * Status post paracentesis 09/25: 4L removed; SAAG <1.1; fluid protein 5.3; no SBP    RECOMMENDATIONS  - Trend LFTs  - Follow up iso-ALP (received)  - For ascites: Status post paracentesis 09/25: 4L removed; SAAG <1.1; fluid protein 5.3; no SBP. Add ADA and TB PCR to fluid studies. Might benefit from retap prior to discharge as abdomen is still distended after repeat HD session on 09/26  - Nephrology team on board for ESRD management: s/p HD session on 09/24  - Serial abdominal exams  - Monitor pain: on tylenol PRN  - TTE noted: EF improved to 58% with normal RV function. Continue home dose of PO lasix 40mg QD. Discontinued aldacctone 25mg QD on 09/24  - Monitor urine in/out (if still making urine)  - Monitor electrolytes and replete as indicated  - Avoid hepatotoxic agents      Chronic Anemia: ACD and Iron deficiency anemia  No overt GI bleeding  * Hb 8.9 on 09/16 -> 9.3 on 09/23; MCV 83.9 (previous Hb 7-8 in 03/2024)  * Total Fe 34, TIIBC 133, S% 29, ferritin 1126 in 10/2022  * INR 1.21 on 09/16  * No melena or hematochezia  * No previous EGD; had colonoscopy with Dr Montenegro 2 years ago  * No FHx of liver diseases or GI cancers    RECOMMENDATIONS  - Trend CBC and keep active T/S  - Nephrology team on board: on HD  - Continue folic acid and cyanocobalamin supplements  - Outpatient follow up with Dr Montenegro to ensure patient is uptodate with scopes        Thank you for your consult.  - Please note that plan was communicated with medical team.   - Please reach GI on [phone] during weekdays till 5pm.  - Please call the GI service line after 5pm on Weekdays and anytime on Weekends: [phone].      Albert Stanley MD  PGY - 5 Gastroenterology Fellow   [facility]

## 2024-09-26 NOTE — PROGRESS NOTE ADULT - ASSESSMENT
Pt is a 64-year-old man with a past medical history of ESRD on hemodialysis MWF (last dialysis on 9/20/24), NIDDM, BPH,DM, CAD s/p  CABG, HTN, CHF, TIA, PAD, left toe ulcer s/p amputation sent in by PCP (Dr. Olivier) for increasing abdomen distention over the last 3 weeks. Patient reported that he developed new onset abdominal distention over the past 3 weeks that is now causing him abdominal pain and difficulty having PO intake with vomiting. He reported that he had to vomit small amounts of food if he eats. He also reported being constipated over the past 2 days. He denied any hematemesis, sob, chest pain, orthopnea, blood in stools, swelling of the legs, fever or chills, He is aneuric and does not produce urine. He denied any history of smoking or excessive alcohol use. Patient presented to the ED on 9/16 and underwent a C, he was discharged on the same day without further work-up. Patient is currently in [facility]      #ESRD on HD MWF  #HFmrEF-Last TTE in 2022  - Patient cannot have po intake without vomiting sips of food. Lying comfortably in bed. Vitals WNL. PE showed distended abdomen with clear lungs  -In the ED:   T: 36.5      HR: 68    BP: 122/61    Spo2: 95% on room air  -Alk Phos: 167  -CT abdomen/pelvis from 9/16/24:   1. Since March 28, 2024, no significant change in large volume abdominopelvic ascites..2. Moderate to large right inguinal hernia contains ascites fluid and a   short segment of nonobstructed cecum and distal ileum.3. Unchanged small left and trace right pleural effusions with small   partially imaged bibasilar opacities.4. A 3.6 cm hyperattenuating focus within the lateral aspect of the right gluteus muscle is suspicious for small hematoma; correlation with clinical history and exam is suggested.  - TTE from 2022:  Left ventricular ejection fraction, by visual estimation, is 45 to 50%. Grade III diastolic dysfunction with elevated left atrial and left ventricular end-diastolic pressures. Moderate mitral regurgitation  - Patient on home PO lasix 40mg, aldactone 25 mg  -  Procedure team consult for paracentesis is in.  -  f/u ascites lab work-up. Extensive work-up ordered  - f/u Pro-Bnp   - f/u TTE  - f/u am Phosphorus and PTH  - f/u with nephrology for HD. Last HD was on Friday  - f/u with GI   - Place patient on renal diet  - received paracentesis today 4 liters    #CAD s/p CABG  #HTN  #History of TIA  - On home Plavix 75 mg daily, Aspirin 81 mg  - On home metoprolol succinate 25 mg , Nifedipine 60 mg daily    #DM  #left toe ulcer s/p amputation   - On Humalog 5 units before breakfast and dinner  - Will decrease it to 3U given patient is not eating that much for now    #Chronic constipation  - On Miralax and senna 2 tabs at night    #BPH  - On flomax 0.4 mg     #Full code  #DVT prophylaxis: Heparin sub q

## 2024-09-26 NOTE — PROGRESS NOTE ADULT - SUBJECTIVE AND OBJECTIVE BOX
SCHWABASIMON FLORES  64y  Male  HPI:  Pt is a 64-year-old man with a past medical history of ESRD on hemodialysis MWF (last dialysis on 9/20/24), NIDDM, BPH,DM, CAD s/p  CABG, HTN, CHF, TIA, PAD, left toe ulcer s/p amputation sent in by PCP (Dr. Olivier) for increasing abdomen distention over the last 3 weeks. Patient reported that he developed new onset abdominal distention over the past 3 weeks that is now causing him abdominal pain and difficulty having PO intake with vomiting. He reported that he had to vomit small amounts of food if he eats. He also reported being constipated over the past 2 days. He denied any hematemesis, sob, chest pain, orthopnea, blood in stools, swelling of the legs, fever or chills, He is aneuric and does not produce urine. He denied any history of smoking or excessive alcohol use. Patient presented to the ED on 9/16 and underwent a C, he was discharged on the same day without further work-up. Patient is currently in [facility]    In the ED:   T: 36.5      HR: 68    BP: 122/61    Spo2: 95% on room air    CT abdomen/pelvis from 9/16/24:   1. Since March 28, 2024, no significant change in large volume abdominopelvic ascites..2. Moderate to large right inguinal hernia contains ascites fluid and a   short segment of nonobstructed cecum and distal ileum.3. Unchanged small left and trace right pleural effusions with small   partially imaged bibasilar opacities.4. A 3.6 cm hyperattenuating focus within the lateral aspect of the right gluteus muscle is suspicious for small hematoma; correlation with clinical history and exam is suggested.           (23 Sep 2024 17:34)    MEDICATIONS  (STANDING):  aspirin  chewable 81 milliGRAM(s) Oral daily  atorvastatin 40 milliGRAM(s) Oral at bedtime  chlorhexidine 2% Cloths 1 Application(s) Topical <User Schedule>  clopidogrel Tablet 75 milliGRAM(s) Oral daily  cyanocobalamin 1000 MICROGram(s) Oral daily  dextrose 5%. 1000 milliLiter(s) (100 mL/Hr) IV Continuous <Continuous>  dextrose 5%. 1000 milliLiter(s) (50 mL/Hr) IV Continuous <Continuous>  dextrose 50% Injectable 25 Gram(s) IV Push once  dextrose 50% Injectable 12.5 Gram(s) IV Push once  dextrose 50% Injectable 25 Gram(s) IV Push once  folic acid 1 milliGRAM(s) Oral daily  furosemide    Tablet 40 milliGRAM(s) Oral daily  glucagon  Injectable 1 milliGRAM(s) IntraMuscular once  heparin   Injectable 5000 Unit(s) SubCutaneous every 8 hours  influenza  Vaccine (HIGH DOSE) 0.5 milliLiter(s) IntraMuscular once  insulin lispro Injectable (ADMELOG) 3 Unit(s) SubCutaneous before breakfast  insulin lispro Injectable (ADMELOG) 3 Unit(s) SubCutaneous before dinner  metoprolol succinate ER 25 milliGRAM(s) Oral daily  NIFEdipine XL 60 milliGRAM(s) Oral daily  pantoprazole    Tablet 40 milliGRAM(s) Oral before breakfast  senna 2 Tablet(s) Oral at bedtime  tamsulosin 0.4 milliGRAM(s) Oral at bedtime    MEDICATIONS  (PRN):  acetaminophen     Tablet .. 650 milliGRAM(s) Oral every 6 hours PRN Mild Pain (1 - 3)  artificial  tears Solution 1 Drop(s) Both EYES every 2 hours PRN Dry Eyes  dextrose Oral Gel 15 Gram(s) Oral once PRN Blood Glucose LESS THAN 70 milliGRAM(s)/deciliter  ondansetron Injectable 4 milliGRAM(s) IV Push every 6 hours PRN Nausea and/or Vomiting    INTERVAL EVENTS: Patient seen today in hemodialysis. Patient had paracentesis with removal of 4 liters, since then denies nausea and vomiting. P    T(C): 36.8 (09-26-24 @ 15:51), Max: 36.8 (09-26-24 @ 15:51)  HR: 81 (09-26-24 @ 15:51) (60 - 81)  BP: 127/67 (09-26-24 @ 15:51) (110/71 - 135/63)  RR: 18 (09-26-24 @ 15:51) (17 - 19)  SpO2: 98% (09-26-24 @ 15:51) (91% - 98%)  Wt(kg): --Vital Signs Last 24 Hrs  T(C): 36.8 (26 Sep 2024 15:51), Max: 36.8 (26 Sep 2024 15:51)  T(F): 98.2 (26 Sep 2024 15:51), Max: 98.2 (26 Sep 2024 15:51)  HR: 81 (26 Sep 2024 15:51) (60 - 81)  BP: 127/67 (26 Sep 2024 15:51) (110/71 - 135/63)  BP(mean): --  RR: 18 (26 Sep 2024 15:51) (17 - 19)  SpO2: 98% (26 Sep 2024 15:51) (91% - 98%)    Parameters below as of 26 Sep 2024 15:51  Patient On (Oxygen Delivery Method): room air        PHYSICAL EXAM:  GENERAL: NAD  NECK: Supple, No JVD  CHEST/LUNG: Clear, decreased at bases  HEART: S1, S2, Regular   ABDOMEN: Soft, Nontender, Remains distended,  but soft, less tense. Bowel sounds present  EXTREMITIES: Decrease edema    LABS:                        8.4    4.35  )-----------( 200      ( 26 Sep 2024 07:04 )             27.0             09-26    136  |  95[L]  |  29[H]  ----------------------------<  130[H]  4.2   |  30  |  5.3[HH]    Ca    7.5[L]      26 Sep 2024 07:04  Phos  2.7     09-26  Mg     1.9     09-26    TPro  6.2  /  Alb  2.6[L]  /  TBili  0.4  /  DBili  x   /  AST  6   /  ALT  <5  /  AlkPhos  151[H]  09-26    LIVER FUNCTIONS - ( 26 Sep 2024 07:04 )  Alb: 2.6 g/dL / Pro: 6.2 g/dL / ALK PHOS: 151 U/L / ALT: <5 U/L / AST: 6 U/L / GGT: x                     Urinalysis Basic - ( 26 Sep 2024 07:04 )    Color: x / Appearance: x / SG: x / pH: x  Gluc: 130 mg/dL / Ketone: x  / Bili: x / Urobili: x   Blood: x / Protein: x / Nitrite: x   Leuk Esterase: x / RBC: x / WBC x   Sq Epi: x / Non Sq Epi: x / Bacteria: x        Culture - Fungal, Body Fluid (collected 25 Sep 2024 14:18)  Source: Peritoneal Peritoneal Fluid  Preliminary Report (26 Sep 2024 11:28):    Testing in progress    Culture - Body Fluid with Gram Stain (collected 25 Sep 2024 14:18)  Source: Ascites Fl Ascites Fluid  Gram Stain (26 Sep 2024 01:58):    polymorphonuclear leukocytes seen    No organisms seen    by cytocentrifuge  Preliminary Report (26 Sep 2024 19:22):    No growth      RADIOLOGY & ADDITIONAL TESTS:  < from: Xray Abdomen Minimum 3 Views (09.25.24 @ 16:57) >  INTERPRETATION:  Clinical history: Obstruction.    Frontal chest x-ray and supine and upright views of the abdomen are   provided forreview and compared to chest x-ray of 3/28/2024 and CT of   the abdomen pelvis dated 9/16/2024.    Findings/  impression:    Stable cardiomediastinal silhouette.    Possible left midlung field opacity. A dedicated chest x-ray with PA and   lateral views is recommended.    No definite evidence of free air or obstruction on this examination.    Nonspecific air distended loops of bowel with residual oral contrast   present    Degenerative change    --- End of Report ---      < end of copied text >

## 2024-09-26 NOTE — PROGRESS NOTE ADULT - SUBJECTIVE AND OBJECTIVE BOX
SUBJECTIVE:  HPI:  Pt is a 64-year-old man with a past medical history of ESRD on hemodialysis MWF (last dialysis on 9/20/24), NIDDM, BPH,DM, CAD s/p  CABG, HTN, CHF, TIA, PAD, left toe ulcer s/p amputation sent in by PCP (Dr. Olivier) for increasing abdomen distention over the last 3 weeks. Patient reported that he developed new onset abdominal distention over the past 3 weeks that is now causing him abdominal pain and difficulty having PO intake with vomiting. He reported that he had to vomit small amounts of food if he eats. He also reported being constipated over the past 2 days. He denied any hematemesis, sob, chest pain, orthopnea, blood in stools, swelling of the legs, fever or chills, He is aneuric and does not produce urine. He denied any history of smoking or excessive alcohol use. Patient presented to the ED on 9/16 and underwent a C, he was discharged on the same day without further work-up. Patient is currently in [facility]    In the ED:   T: 36.5      HR: 68    BP: 122/61    Spo2: 95% on room air    CT abdomen/pelvis from 9/16/24:   1. Since March 28, 2024, no significant change in large volume abdominopelvic ascites..2. Moderate to large right inguinal hernia contains ascites fluid and a   short segment of nonobstructed cecum and distal ileum.3. Unchanged small left and trace right pleural effusions with small   partially imaged bibasilar opacities.4. A 3.6 cm hyperattenuating focus within the lateral aspect of the right gluteus muscle is suspicious for small hematoma; correlation with clinical history and exam is suggested.           (23 Sep 2024 17:34)      Patient is a 64y old Male who presents with a chief complaint of   Currently admitted to medicine with the primary diagnosis of Abdominal distention       Today is hospital day 3d.     PAST MEDICAL & SURGICAL HISTORY  S/P CABG (coronary artery bypass graft)  x4    Diabetes mellitus    Transient ischemic attack (TIA)  2017; 2008    Chronic kidney disease (CKD)  Stage IV    Hypertension    Stented coronary artery  in 2008    Myocardial infarction  2012    PAD (peripheral artery disease)  S/p bypass left leg    HLD (hyperlipidemia)    BPH (benign prostatic hyperplasia)    Pain in left knee  s/p fall    OA (osteoarthritis)    [place] (hard of hearing[place]    Chronic anemia    History of medical problems  left arm precaution    S/P CABG (coronary artery bypass graft)  2012    H/O arterial bypass of lower limb  Left Lower Extremity (2016)    History of surgery  Left CEA (2017)  Left Pinkie toe Amputation (2014)  CABG x 4 (2012)  Card cath - stent (2008)      AV fistula  2019  LEFT AV FISTULA        ALLERGIES:  No Known Allergies    MEDICATIONS:  ACTIVE MEDICATIONS  acetaminophen     Tablet .. 650 milliGRAM(s) Oral every 6 hours PRN  artificial  tears Solution 1 Drop(s) Both EYES every 2 hours PRN  aspirin  chewable 81 milliGRAM(s) Oral daily  atorvastatin 40 milliGRAM(s) Oral at bedtime  chlorhexidine 2% Cloths 1 Application(s) Topical <User Schedule>  clopidogrel Tablet 75 milliGRAM(s) Oral daily  cyanocobalamin 1000 MICROGram(s) Oral daily  dextrose 5%. 1000 milliLiter(s) IV Continuous <Continuous>  dextrose 5%. 1000 milliLiter(s) IV Continuous <Continuous>  dextrose 50% Injectable 25 Gram(s) IV Push once  dextrose 50% Injectable 12.5 Gram(s) IV Push once  dextrose 50% Injectable 25 Gram(s) IV Push once  dextrose Oral Gel 15 Gram(s) Oral once PRN  folic acid 1 milliGRAM(s) Oral daily  furosemide    Tablet 40 milliGRAM(s) Oral daily  glucagon  Injectable 1 milliGRAM(s) IntraMuscular once  heparin   Injectable 5000 Unit(s) SubCutaneous every 8 hours  influenza  Vaccine (HIGH DOSE) 0.5 milliLiter(s) IntraMuscular once  insulin lispro Injectable (ADMELOG) 3 Unit(s) SubCutaneous before dinner  insulin lispro Injectable (ADMELOG) 3 Unit(s) SubCutaneous before breakfast  metoprolol succinate ER 25 milliGRAM(s) Oral daily  NIFEdipine XL 60 milliGRAM(s) Oral daily  ondansetron Injectable 4 milliGRAM(s) IV Push every 6 hours PRN  pantoprazole    Tablet 40 milliGRAM(s) Oral before breakfast  senna 2 Tablet(s) Oral at bedtime  tamsulosin 0.4 milliGRAM(s) Oral at bedtime      VITALS:   Vital Signs Last 24 Hrs  T(C): 36.5 (26 Sep 2024 12:27), Max: 36.6 (26 Sep 2024 00:01)  T(F): 97.7 (26 Sep 2024 12:27), Max: 97.9 (26 Sep 2024 00:01)  HR: 71 (26 Sep 2024 12:27) (60 - 78)  BP: 110/71 (26 Sep 2024 12:27) (110/71 - 146/68)  BP(mean): --  RR: 17 (26 Sep 2024 12:27) (17 - 20)  SpO2: 97% (26 Sep 2024 12:27) (91% - 97%)    Parameters below as of 26 Sep 2024 12:27  Patient On (Oxygen Delivery Method): room air        LABS:                          8.4    4.35  )-----------( 200      ( 26 Sep 2024 07:04 )             27.0     09-26    136  |  95[L]  |  29[H]  ----------------------------<  130[H]  4.2   |  30  |  5.3[HH]    Ca    7.5[L]      26 Sep 2024 07:04  Phos  2.7     09-26  Mg     1.9     09-26    TPro  6.2  /  Alb  2.6[L]  /  TBili  0.4  /  DBili  x   /  AST  6   /  ALT  <5  /  AlkPhos  151[H]  09-26    LIVER FUNCTIONS - ( 26 Sep 2024 07:04 )  Alb: 2.6 g/dL / Pro: 6.2 g/dL / ALK PHOS: 151 U/L / ALT: <5 U/L / AST: 6 U/L / GGT: x             Urinalysis Basic - ( 26 Sep 2024 07:04 )    Color: x / Appearance: x / SG: x / pH: x  Gluc: 130 mg/dL / Ketone: x  / Bili: x / Urobili: x   Blood: x / Protein: x / Nitrite: x   Leuk Esterase: x / RBC: x / WBC x   Sq Epi: x / Non Sq Epi: x / Bacteria: x        PHYSICAL EXAM:  GEN: No acute distress  LUNGS: Clear to auscultation bilaterally   HEART: S1/S2 present.    ABD: Soft, non-tender, non-distended.   EXT: No pedal edema  NEURO: AAOX3

## 2024-09-26 NOTE — PROGRESS NOTE ADULT - ASSESSMENT
Pt is a 64-year-old man with a past medical history of ESRD on hemodialysis MWF (last dialysis on 9/20/24), NIDDM, BPH,DM, CAD s/p  CABG, HTN, CHF, TIA, PAD, left toe ulcer s/p amputation sent in by PCP (Dr. Olivier) for increasing abdomen distention over the last 3 weeks.  next HD today standard bath uf 2 liters as tolerated   phos noted, no binders  BP controlled   d/c lasix if anuric   might need to decrease nifedipine    s/p paracentesis pending with 4 liters fluid removal   h/h noted will resume RAMSEY with HD / check iron stores   will follow

## 2024-09-26 NOTE — PROGRESS NOTE ADULT - ASSESSMENT
64 year old man from [facility] with a past medical history of ESRD on hemodialysis [facility] (last dialysis on 9/20/24), NIDDM, BPH, DM, CAD s/p  CABG, HTN, CHF, TIA, PAD, left toe ulcer s/p amputation sent in by PCP (Dr. Olivier) for increasing abdomen distention over the last 3 weeks. Patient with increase abdominal girth despite weight loss. Patient's weight done 20 lbs in the last month    Nausea Vomiting now resolved  Increase Abdominal Girth due to Ascites, No evidence of Cirrhosis  - CT noted  - GI evaluation noted  - paracentesis done, no evidence of SBP  - may benefit from another tap prior to discharge    ESRD  - continue HMD  - weight continues trending down  - leg edema improved    CAD, post CABG, CHF  on rx  - await 2d echo results    PAD, hx of toe amputation  - podiatry follows as outpatient      GI prophylaxsis  DVT Heparin  Diet resumed    Patient remains acute, further plan pending the above results.

## 2024-09-26 NOTE — PROGRESS NOTE ADULT - SUBJECTIVE AND OBJECTIVE BOX
Hepatology Follow Up Note      Location: [facility] [place] 4B 012 B ([facility] 4B)  Patient Name: LAWRENCE SCHWABACHER  Age: 64y  Gender: Male      Chief Complaint  Patient is a 64y old Male who presents with a chief complaint of   Primary diagnosis of Abdominal pain        Reason for Consult  Ascites      Progress Note  Patient is doing fine.  He mild abdominal discomfort.   He denies nausea or vomiting.  He had a BM on 09/24.        Vital Signs in the last 24 hours   Vital Signs Last 24 Hrs  T(C): 36.5 (26 Sep 2024 12:27), Max: 36.6 (26 Sep 2024 00:01)  T(F): 97.7 (26 Sep 2024 12:27), Max: 97.9 (26 Sep 2024 00:01)  HR: 71 (26 Sep 2024 12:27) (60 - 78)  BP: 110/71 (26 Sep 2024 12:27) (110/71 - 146/68)  BP(mean): --  RR: 17 (26 Sep 2024 12:27) (17 - 20)  SpO2: 97% (26 Sep 2024 12:27) (91% - 97%)    Parameters below as of 26 Sep 2024 12:27  Patient On (Oxygen Delivery Method): room air        Physical Exam  * General Appearance: Alert, cooperative, interactive, oriented to time, place, and person, in no acute distress  * Eyes: PERRL, conjunctiva/corneas clear, EOM's intact, fundi benign, both eyes  * Lungs: Good bilateral air entry, audible crackles  * Heart: Regular Rate and Rhythm, normal S1 and S2, no audible murmur, rub, or gallop  * Abdomen: Symmetric, softly distended, non-tender, bowel sounds active all four quadrants, no masses  * Extremities: +1 lower extremity pitting edema bilaterally; wrapped      Investigations                8.8    4.51  )-----------( 173      ( 25 Sep 2024 06:49 )             28.0     09-25    135  |  95[L]  |  24[H]  ----------------------------<  81  4.0   |  25  |  4.6[HH]    Ca    7.9[L]      25 Sep 2024 06:49  Phos  2.8     09-25  Mg     1.9     09-25    TPro  6.7  /  Alb  2.9[L]  /  TBili  0.4  /  DBili  x   /  AST  6   /  ALT  <5  /  AlkPhos  153[H]  09-25        LIVER FUNCTIONS - ( 25 Sep 2024 06:49 )  Alb: 2.9 g/dL / Pro: 6.7 g/dL / ALK PHOS: 153 U/L / ALT: <5 U/L / AST: 6 U/L / GGT: 23 U/L           Urinalysis Basic - ( 25 Sep 2024 06:49 )    Color: x / Appearance: x / SG: x / pH: x  Gluc: 81 mg/dL / Ketone: x  / Bili: x / Urobili: x   Blood: x / Protein: x / Nitrite: x   Leuk Esterase: x / RBC: x / WBC x   Sq Epi: x / Non Sq Epi: x / Bacteria: x        Current Medications  Standing Medications  aspirin  chewable 81 milliGRAM(s) Oral daily  atorvastatin 40 milliGRAM(s) Oral at bedtime  chlorhexidine 2% Cloths 1 Application(s) Topical <User Schedule>  clopidogrel Tablet 75 milliGRAM(s) Oral daily  cyanocobalamin 1000 MICROGram(s) Oral daily  dextrose 5%. 1000 milliLiter(s) (50 mL/Hr) IV Continuous <Continuous>  dextrose 5%. 1000 milliLiter(s) (100 mL/Hr) IV Continuous <Continuous>  dextrose 50% Injectable 25 Gram(s) IV Push once  dextrose 50% Injectable 12.5 Gram(s) IV Push once  dextrose 50% Injectable 25 Gram(s) IV Push once  folic acid 1 milliGRAM(s) Oral daily  furosemide    Tablet 40 milliGRAM(s) Oral daily  glucagon  Injectable 1 milliGRAM(s) IntraMuscular once  heparin   Injectable 5000 Unit(s) SubCutaneous every 8 hours  influenza  Vaccine (HIGH DOSE) 0.5 milliLiter(s) IntraMuscular once  insulin lispro Injectable (ADMELOG) 3 Unit(s) SubCutaneous before dinner  insulin lispro Injectable (ADMELOG) 3 Unit(s) SubCutaneous before breakfast  metoprolol succinate ER 25 milliGRAM(s) Oral daily  NIFEdipine XL 60 milliGRAM(s) Oral daily  pantoprazole    Tablet 40 milliGRAM(s) Oral before breakfast  senna 2 Tablet(s) Oral at bedtime  tamsulosin 0.4 milliGRAM(s) Oral at bedtime    PRN Medications  acetaminophen     Tablet .. 650 milliGRAM(s) Oral every 6 hours PRN Mild Pain (1 - 3)  artificial  tears Solution 1 Drop(s) Both EYES every 2 hours PRN Dry Eyes  dextrose Oral Gel 15 Gram(s) Oral once PRN Blood Glucose LESS THAN 70 milliGRAM(s)/deciliter  ondansetron Injectable 4 milliGRAM(s) IV Push every 6 hours PRN Nausea and/or Vomiting    Singles Doses Administered

## 2024-09-27 LAB
ALBUMIN SERPL ELPH-MCNC: 2.8 G/DL — LOW (ref 3.5–5.2)
ALP SERPL-CCNC: 168 U/L — HIGH (ref 30–115)
ALT FLD-CCNC: <5 U/L — SIGNIFICANT CHANGE UP (ref 0–41)
ANION GAP SERPL CALC-SCNC: 10 MMOL/L — SIGNIFICANT CHANGE UP (ref 7–14)
AST SERPL-CCNC: 6 U/L — SIGNIFICANT CHANGE UP (ref 0–41)
BASOPHILS # BLD AUTO: 0.07 K/UL — SIGNIFICANT CHANGE UP (ref 0–0.2)
BASOPHILS NFR BLD AUTO: 1.4 % — HIGH (ref 0–1)
BILIRUB SERPL-MCNC: 0.4 MG/DL — SIGNIFICANT CHANGE UP (ref 0.2–1.2)
BUN SERPL-MCNC: 22 MG/DL — HIGH (ref 10–20)
CALCIUM SERPL-MCNC: 7.4 MG/DL — LOW (ref 8.4–10.5)
CHLORIDE SERPL-SCNC: 97 MMOL/L — LOW (ref 98–110)
CO2 SERPL-SCNC: 30 MMOL/L — SIGNIFICANT CHANGE UP (ref 17–32)
CREAT SERPL-MCNC: 4.3 MG/DL — CRITICAL HIGH (ref 0.7–1.5)
EGFR: 14 ML/MIN/1.73M2 — LOW
EOSINOPHIL # BLD AUTO: 0.17 K/UL — SIGNIFICANT CHANGE UP (ref 0–0.7)
EOSINOPHIL NFR BLD AUTO: 3.4 % — SIGNIFICANT CHANGE UP (ref 0–8)
GLUCOSE BLDC GLUCOMTR-MCNC: 156 MG/DL — HIGH (ref 70–99)
GLUCOSE BLDC GLUCOMTR-MCNC: 158 MG/DL — HIGH (ref 70–99)
GLUCOSE BLDC GLUCOMTR-MCNC: 172 MG/DL — HIGH (ref 70–99)
GLUCOSE BLDC GLUCOMTR-MCNC: 201 MG/DL — HIGH (ref 70–99)
GLUCOSE SERPL-MCNC: 137 MG/DL — HIGH (ref 70–99)
HCT VFR BLD CALC: 27.4 % — LOW (ref 42–52)
HGB BLD-MCNC: 8.5 G/DL — LOW (ref 14–18)
IMM GRANULOCYTES NFR BLD AUTO: 0.2 % — SIGNIFICANT CHANGE UP (ref 0.1–0.3)
LYMPHOCYTES # BLD AUTO: 0.62 K/UL — LOW (ref 1.2–3.4)
LYMPHOCYTES # BLD AUTO: 12.5 % — LOW (ref 20.5–51.1)
MAGNESIUM SERPL-MCNC: 1.9 MG/DL — SIGNIFICANT CHANGE UP (ref 1.8–2.4)
MCHC RBC-ENTMCNC: 26.5 PG — LOW (ref 27–31)
MCHC RBC-ENTMCNC: 31 G/DL — LOW (ref 32–37)
MCV RBC AUTO: 85.4 FL — SIGNIFICANT CHANGE UP (ref 80–94)
MONOCYTES # BLD AUTO: 0.55 K/UL — SIGNIFICANT CHANGE UP (ref 0.1–0.6)
MONOCYTES NFR BLD AUTO: 11.1 % — HIGH (ref 1.7–9.3)
NEUTROPHILS # BLD AUTO: 3.53 K/UL — SIGNIFICANT CHANGE UP (ref 1.4–6.5)
NEUTROPHILS NFR BLD AUTO: 71.4 % — SIGNIFICANT CHANGE UP (ref 42.2–75.2)
NRBC # BLD: 0 /100 WBCS — SIGNIFICANT CHANGE UP (ref 0–0)
PHOSPHATE SERPL-MCNC: 2 MG/DL — LOW (ref 2.1–4.9)
PLATELET # BLD AUTO: 211 K/UL — SIGNIFICANT CHANGE UP (ref 130–400)
PMV BLD: 11.1 FL — HIGH (ref 7.4–10.4)
POTASSIUM SERPL-MCNC: 4.1 MMOL/L — SIGNIFICANT CHANGE UP (ref 3.5–5)
POTASSIUM SERPL-SCNC: 4.1 MMOL/L — SIGNIFICANT CHANGE UP (ref 3.5–5)
PROT SERPL-MCNC: 6.3 G/DL — SIGNIFICANT CHANGE UP (ref 6–8)
RBC # BLD: 3.21 M/UL — LOW (ref 4.7–6.1)
RBC # FLD: 17.3 % — HIGH (ref 11.5–14.5)
SODIUM SERPL-SCNC: 137 MMOL/L — SIGNIFICANT CHANGE UP (ref 135–146)
WBC # BLD: 4.95 K/UL — SIGNIFICANT CHANGE UP (ref 4.8–10.8)
WBC # FLD AUTO: 4.95 K/UL — SIGNIFICANT CHANGE UP (ref 4.8–10.8)

## 2024-09-27 PROCEDURE — 49083 ABD PARACENTESIS W/IMAGING: CPT

## 2024-09-27 RX ADMIN — PANTOPRAZOLE SODIUM 40 MILLIGRAM(S): 40 TABLET, DELAYED RELEASE ORAL at 05:48

## 2024-09-27 RX ADMIN — Medication 60 MILLIGRAM(S): at 05:49

## 2024-09-27 RX ADMIN — Medication 650 MILLIGRAM(S): at 21:59

## 2024-09-27 RX ADMIN — Medication 0.4 MILLIGRAM(S): at 21:57

## 2024-09-27 RX ADMIN — Medication 1000 MICROGRAM(S): at 12:03

## 2024-09-27 RX ADMIN — Medication 5000 UNIT(S): at 21:57

## 2024-09-27 RX ADMIN — Medication 75 MILLIGRAM(S): at 12:02

## 2024-09-27 RX ADMIN — Medication 81 MILLIGRAM(S): at 12:02

## 2024-09-27 RX ADMIN — FUROSEMIDE 40 MILLIGRAM(S): 10 INJECTION INTRAVENOUS at 05:49

## 2024-09-27 RX ADMIN — CHLORHEXIDINE GLUCONATE ORAL RINSE 1 APPLICATION(S): 1.2 SOLUTION DENTAL at 05:52

## 2024-09-27 RX ADMIN — Medication 5000 UNIT(S): at 05:48

## 2024-09-27 RX ADMIN — Medication 650 MILLIGRAM(S): at 22:30

## 2024-09-27 RX ADMIN — Medication 100 MILLILITER(S): at 17:14

## 2024-09-27 RX ADMIN — Medication 25 MILLIGRAM(S): at 05:48

## 2024-09-27 RX ADMIN — ATORVASTATIN CALCIUM 40 MILLIGRAM(S): 10 TABLET, FILM COATED ORAL at 21:57

## 2024-09-27 RX ADMIN — FOLIC ACID 1 MILLIGRAM(S): 1 TABLET ORAL at 12:03

## 2024-09-27 RX ADMIN — Medication 3 UNIT(S): at 17:14

## 2024-09-27 RX ADMIN — Medication 5000 UNIT(S): at 13:28

## 2024-09-27 NOTE — PROGRESS NOTE ADULT - ASSESSMENT
64 year old man from SNF with a past medical history of ESRD on hemodialysis [facility] (last dialysis on 9/20/24), NIDDM, BPH, DM, CAD s/p  CABG, HTN, CHF, TIA, PAD, left toe ulcer s/p amputation sent in by PCP (Dr. Olivier) for increasing abdomen distention over the last 3 weeks. Patient with increase abdominal girth despite weight loss. 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 Continue to monitor weight. Reviewed SNF chart, weight down from 226 lbs last month.  D/C planning back to [facility].

## 2024-09-27 NOTE — PROGRESS NOTE ADULT - SUBJECTIVE AND OBJECTIVE BOX
Nephrology progress note    Patient was seen and examined, events over the last 24 h noted .    Allergies:  No Known Allergies    Hospital Medications:   MEDICATIONS  (STANDING):  aspirin  chewable 81 milliGRAM(s) Oral daily  atorvastatin 40 milliGRAM(s) Oral at bedtime  chlorhexidine 2% Cloths 1 Application(s) Topical <User Schedule>  clopidogrel Tablet 75 milliGRAM(s) Oral daily  cyanocobalamin 1000 MICROGram(s) Oral daily  dextrose 5%. 1000 milliLiter(s) (100 mL/Hr) IV Continuous <Continuous>  dextrose 5%. 1000 milliLiter(s) (50 mL/Hr) IV Continuous <Continuous>  dextrose 50% Injectable 25 Gram(s) IV Push once  dextrose 50% Injectable 12.5 Gram(s) IV Push once  dextrose 50% Injectable 25 Gram(s) IV Push once  folic acid 1 milliGRAM(s) Oral daily  furosemide    Tablet 40 milliGRAM(s) Oral daily  glucagon  Injectable 1 milliGRAM(s) IntraMuscular once  heparin   Injectable 5000 Unit(s) SubCutaneous every 8 hours  influenza  Vaccine (HIGH DOSE) 0.5 milliLiter(s) IntraMuscular once  insulin lispro Injectable (ADMELOG) 3 Unit(s) SubCutaneous before breakfast  insulin lispro Injectable (ADMELOG) 3 Unit(s) SubCutaneous before dinner  metoprolol succinate ER 25 milliGRAM(s) Oral daily  NIFEdipine XL 60 milliGRAM(s) Oral daily  pantoprazole    Tablet 40 milliGRAM(s) Oral before breakfast  senna 2 Tablet(s) Oral at bedtime  tamsulosin 0.4 milliGRAM(s) Oral at bedtime        VITALS:  T(F): 97.6 (09-27-24 @ 07:15), Max: 98.2 (09-26-24 @ 15:51)  HR: 69 (09-27-24 @ 07:15)  BP: 129/65 (09-27-24 @ 07:15)  RR: 18 (09-27-24 @ 07:15)  SpO2: 98% (09-27-24 @ 07:15)  Wt(kg): --    09-26 @ 07:01  -  09-27 @ 07:00  --------------------------------------------------------  IN: 0 mL / OUT: 2000 mL / NET: -2000 mL          PHYSICAL EXAM:  Constitutional: NAD  HEENT: anicteric sclera, oropharynx clear, MMM  Neck: No JVD  Respiratory: CTAB, no wheezes, rales or rhonchi  Cardiovascular: S1, S2, RRR  Gastrointestinal: BS+, soft, NT/ND  Extremities: No cyanosis or clubbing. No peripheral edema  :  No schneider.   Skin: No rashes    LABS:  09-27    137  |  97[L]  |  22[H]  ----------------------------<  137[H]  4.1   |  30  |  4.3[HH]    Ca    7.4[L]      27 Sep 2024 06:53  Phos  2.0     09-27  Mg     1.9     09-27    TPro  6.3  /  Alb  2.8[L]  /  TBili  0.4  /  DBili      /  AST  6   /  ALT  <5  /  AlkPhos  168[H]  09-27                          8.5    4.95  )-----------( 211      ( 27 Sep 2024 06:53 )             27.4       Urine Studies:  Urinalysis Basic - ( 27 Sep 2024 06:53 )    Color:  / Appearance:  / SG:  / pH:   Gluc: 137 mg/dL / Ketone:   / Bili:  / Urobili:    Blood:  / Protein:  / Nitrite:    Leuk Esterase:  / RBC:  / WBC    Sq Epi:  / Non Sq Epi:  / Bacteria:         RADIOLOGY & ADDITIONAL STUDIES:

## 2024-09-27 NOTE — PROGRESS NOTE ADULT - ASSESSMENT
64 year old man from SNF with a past medical history of ESRD on hemodialysis [facility] (last dialysis on 9/20/24), NIDDM, BPH, DM, CAD s/p  CABG, HTN, CHF, TIA, PAD, left toe ulcer s/p amputation sent in by PCP (Dr. Olivier) for increasing abdomen distention over the last 3 weeks. Patient with increase abdominal girth despite weight loss. 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

## 2024-09-27 NOTE — PROGRESS NOTE ADULT - ASSESSMENT
64-year-old man with a past medical history of ESRD on hemodialysis MWF (last dialysis on 9/20/24), NIDDM, BPH,DM, CAD s/p  CABG, HTN, CHF, TIA, PAD, left toe ulcer s/p amputation sent in by PCP (Dr. Olivier) for increasing abdomen distention over the last 3 weeks.    HD yesterday, next tomorrow   phos noted, no binders  BP controlled    s/p paracentesis with 4 liters fluid removal 9/25 for repeat tap today  h/h noted resume RAMSEY with HD / check iron stores   will follow

## 2024-09-27 NOTE — PROGRESS NOTE ADULT - SUBJECTIVE AND OBJECTIVE BOX
SCHWABACHERSIMON  64y  Male  HPI:  Pt is a 64-year-old man with a past medical history of ESRD on hemodialysis MWF (last dialysis on 9/20/24), NIDDM, BPH,DM, CAD s/p  CABG, HTN, CHF, TIA, PAD, left toe ulcer s/p amputation sent in by PCP (Dr. Olivier) for increasing abdomen distention over the last 3 weeks. Patient reported that he developed new onset abdominal distention over the past 3 weeks that is now causing him abdominal pain and difficulty having PO intake with vomiting. He reported that he had to vomit small amounts of food if he eats. He also reported being constipated over the past 2 days. He denied any hematemesis, sob, chest pain, orthopnea, blood in stools, swelling of the legs, fever or chills, He is aneuric and does not produce urine. He denied any history of smoking or excessive alcohol use. Patient presented to the ED on 9/16 and underwent a C, he was discharged on the same day without further work-up. Patient is currently in [facility]    In the ED:   T: 36.5      HR: 68    BP: 122/61    Spo2: 95% on room air    CT abdomen/pelvis from 9/16/24:   1. Since March 28, 2024, no significant change in large volume abdominopelvic ascites..2. Moderate to large right inguinal hernia contains ascites fluid and a   short segment of nonobstructed cecum and distal ileum.3. Unchanged small left and trace right pleural effusions with small   partially imaged bibasilar opacities.4. A 3.6 cm hyperattenuating focus within the lateral aspect of the right gluteus muscle is suspicious for small hematoma; correlation with clinical history and exam is suggested.           (23 Sep 2024 17:34)    MEDICATIONS  (STANDING):  aspirin  chewable 81 milliGRAM(s) Oral daily  atorvastatin 40 milliGRAM(s) Oral at bedtime  chlorhexidine 2% Cloths 1 Application(s) Topical <User Schedule>  clopidogrel Tablet 75 milliGRAM(s) Oral daily  cyanocobalamin 1000 MICROGram(s) Oral daily  dextrose 5%. 1000 milliLiter(s) (50 mL/Hr) IV Continuous <Continuous>  dextrose 5%. 1000 milliLiter(s) (100 mL/Hr) IV Continuous <Continuous>  dextrose 50% Injectable 25 Gram(s) IV Push once  dextrose 50% Injectable 12.5 Gram(s) IV Push once  dextrose 50% Injectable 25 Gram(s) IV Push once  folic acid 1 milliGRAM(s) Oral daily  furosemide    Tablet 40 milliGRAM(s) Oral daily  glucagon  Injectable 1 milliGRAM(s) IntraMuscular once  heparin   Injectable 5000 Unit(s) SubCutaneous every 8 hours  influenza  Vaccine (HIGH DOSE) 0.5 milliLiter(s) IntraMuscular once  insulin lispro Injectable (ADMELOG) 3 Unit(s) SubCutaneous before breakfast  insulin lispro Injectable (ADMELOG) 3 Unit(s) SubCutaneous before dinner  metoprolol succinate ER 25 milliGRAM(s) Oral daily  NIFEdipine XL 60 milliGRAM(s) Oral daily  pantoprazole    Tablet 40 milliGRAM(s) Oral before breakfast  senna 2 Tablet(s) Oral at bedtime  tamsulosin 0.4 milliGRAM(s) Oral at bedtime    MEDICATIONS  (PRN):  acetaminophen     Tablet .. 650 milliGRAM(s) Oral every 6 hours PRN Mild Pain (1 - 3)  artificial  tears Solution 1 Drop(s) Both EYES every 2 hours PRN Dry Eyes  dextrose Oral Gel 15 Gram(s) Oral once PRN Blood Glucose LESS THAN 70 milliGRAM(s)/deciliter  ondansetron Injectable 4 milliGRAM(s) IV Push every 6 hours PRN Nausea and/or Vomiting    INTERVAL EVENTS: Patient seen today lying flat... belly protuberant. Patient cranky, did not sleep well last night, room mate kept him up.    T(C): 36.4 (09-27-24 @ 07:15), Max: 36.8 (09-26-24 @ 15:51)  HR: 69 (09-27-24 @ 07:15) (69 - 81)  BP: 129/65 (09-27-24 @ 07:15) (110/71 - 129/65)  RR: 18 (09-27-24 @ 07:15) (17 - 18)  SpO2: 98% (09-27-24 @ 07:15) (97% - 98%)  Wt(kg): --Vital Signs Last 24 Hrs    194.8 lbs today  T(C): 36.4 (27 Sep 2024 07:15), Max: 36.8 (26 Sep 2024 15:51)  T(F): 97.6 (27 Sep 2024 07:15), Max: 98.2 (26 Sep 2024 15:51)  HR: 69 (27 Sep 2024 07:15) (69 - 81)  BP: 129/65 (27 Sep 2024 07:15) (110/71 - 129/65)  BP(mean): --  RR: 18 (27 Sep 2024 07:15) (17 - 18)  SpO2: 98% (27 Sep 2024 07:15) (97% - 98%)    Parameters below as of 27 Sep 2024 07:15  Patient On (Oxygen Delivery Method): room air        PHYSICAL EXAM:  GENERAL: NAD  NECK: Supple, No JVD  CHEST/LUNG: Decreased, no wheeze  HEART: S1, S2  ABDOMEN: Soft, Nontender, Distended; Bowel sounds present  EXTREMITIES: Decreased edema      LABS:                        8.5    4.95  )-----------( 211      ( 27 Sep 2024 06:53 )             27.4             09-27    137  |  97[L]  |  22[H]  ----------------------------<  137[H]  4.1   |  30  |  4.3[HH]    Ca    7.4[L]      27 Sep 2024 06:53  Phos  2.0     09-27  Mg     1.9     09-27    TPro  6.3  /  Alb  2.8[L]  /  TBili  0.4  /  DBili  x   /  AST  6   /  ALT  <5  /  AlkPhos  168[H]  09-27    LIVER FUNCTIONS - ( 27 Sep 2024 06:53 )  Alb: 2.8 g/dL / Pro: 6.3 g/dL / ALK PHOS: 168 U/L / ALT: <5 U/L / AST: 6 U/L / GGT: x                         Urinalysis Basic - ( 27 Sep 2024 06:53 )    Color: x / Appearance: x / SG: x / pH: x  Gluc: 137 mg/dL / Ketone: x  / Bili: x / Urobili: x   Blood: x / Protein: x / Nitrite: x   Leuk Esterase: x / RBC: x / WBC x   Sq Epi: x / Non Sq Epi: x / Bacteria: x        Culture - Fungal, Body Fluid (collected 25 Sep 2024 14:18)  Source: Peritoneal Peritoneal Fluid  Preliminary Report (26 Sep 2024 11:28):    Testing in progress    Culture - Body Fluid with Gram Stain (collected 25 Sep 2024 14:18)  Source: Ascites Fl Ascites Fluid  Gram Stain (26 Sep 2024 01:58):    polymorphonuclear leukocytes seen    No organisms seen    by cytocentrifuge  Preliminary Report (26 Sep 2024 19:22):    No growth      RADIOLOGY & ADDITIONAL TESTS:

## 2024-09-27 NOTE — PROGRESS NOTE ADULT - SUBJECTIVE AND OBJECTIVE BOX
SUBJECTIVE:  HPI:  Pt is a 64-year-old man with a past medical history of ESRD on hemodialysis MWF (last dialysis on 9/20/24), NIDDM, BPH,DM, CAD s/p  CABG, HTN, CHF, TIA, PAD, left toe ulcer s/p amputation sent in by PCP (Dr. Olivier) for increasing abdomen distention over the last 3 weeks. Patient reported that he developed new onset abdominal distention over the past 3 weeks that is now causing him abdominal pain and difficulty having PO intake with vomiting. He reported that he had to vomit small amounts of food if he eats. He also reported being constipated over the past 2 days. He denied any hematemesis, sob, chest pain, orthopnea, blood in stools, swelling of the legs, fever or chills, He is aneuric and does not produce urine. He denied any history of smoking or excessive alcohol use. Patient presented to the ED on 9/16 and underwent a C, he was discharged on the same day without further work-up. Patient is currently in [facility]    In the ED:   T: 36.5      HR: 68    BP: 122/61    Spo2: 95% on room air    CT abdomen/pelvis from 9/16/24:   1. Since March 28, 2024, no significant change in large volume abdominopelvic ascites..2. Moderate to large right inguinal hernia contains ascites fluid and a   short segment of nonobstructed cecum and distal ileum.3. Unchanged small left and trace right pleural effusions with small   partially imaged bibasilar opacities.4. A 3.6 cm hyperattenuating focus within the lateral aspect of the right gluteus muscle is suspicious for small hematoma; correlation with clinical history and exam is suggested.    Patient admitted due to inability to tolerate PO intake.     Patient assessed today and he has no complaints. He has been able to tolerate PO intake for last 48 hours. Abdominal pain improved with repeat paracentesis today.        Today is hospital day 4d.     PAST MEDICAL & SURGICAL HISTORY  S/P CABG (coronary artery bypass graft)  x4    Diabetes mellitus    Transient ischemic attack (TIA)  2017; 2008    Chronic kidney disease (CKD)  Stage IV    Hypertension    Stented coronary artery  in 2008    Myocardial infarction  2012    PAD (peripheral artery disease)  S/p bypass left leg    HLD (hyperlipidemia)    BPH (benign prostatic hyperplasia)    Pain in left knee  s/p fall    OA (osteoarthritis)    [place] (hard of hearing)    Chronic anemia    History of medical problems  left arm precaution    S/P CABG (coronary artery bypass graft)  2012    H/O arterial bypass of lower limb  Left Lower Extremity (2016)    History of surgery  Left CEA (2017)  Left Pinkie toe Amputation (2014)  CABG x 4 (2012)  Card cath - stent (2008)      AV fistula  2019  LEFT AV FISTULA        ALLERGIES:  No Known Allergies    MEDICATIONS:  ACTIVE MEDICATIONS  acetaminophen     Tablet .. 650 milliGRAM(s) Oral every 6 hours PRN  artificial  tears Solution 1 Drop(s) Both EYES every 2 hours PRN  aspirin  chewable 81 milliGRAM(s) Oral daily  atorvastatin 40 milliGRAM(s) Oral at bedtime  chlorhexidine 2% Cloths 1 Application(s) Topical <User Schedule>  clopidogrel Tablet 75 milliGRAM(s) Oral daily  cyanocobalamin 1000 MICROGram(s) Oral daily  dextrose 5%. 1000 milliLiter(s) IV Continuous <Continuous>  dextrose 5%. 1000 milliLiter(s) IV Continuous <Continuous>  dextrose 50% Injectable 25 Gram(s) IV Push once  dextrose 50% Injectable 12.5 Gram(s) IV Push once  dextrose 50% Injectable 25 Gram(s) IV Push once  dextrose Oral Gel 15 Gram(s) Oral once PRN  folic acid 1 milliGRAM(s) Oral daily  furosemide    Tablet 40 milliGRAM(s) Oral daily  glucagon  Injectable 1 milliGRAM(s) IntraMuscular once  heparin   Injectable 5000 Unit(s) SubCutaneous every 8 hours  influenza  Vaccine (HIGH DOSE) 0.5 milliLiter(s) IntraMuscular once  insulin lispro Injectable (ADMELOG) 3 Unit(s) SubCutaneous before breakfast  insulin lispro Injectable (ADMELOG) 3 Unit(s) SubCutaneous before dinner  metoprolol succinate ER 25 milliGRAM(s) Oral daily  NIFEdipine XL 60 milliGRAM(s) Oral daily  ondansetron Injectable 4 milliGRAM(s) IV Push every 6 hours PRN  pantoprazole    Tablet 40 milliGRAM(s) Oral before breakfast  senna 2 Tablet(s) Oral at bedtime  tamsulosin 0.4 milliGRAM(s) Oral at bedtime      VITALS:   T(F): 97.6  HR: 59  BP: 120/66  RR: 18  SpO2: 98%    LABS:                        8.5    4.95  )-----------( 211      ( 27 Sep 2024 06:53 )             27.4     09-27    137  |  97[L]  |  22[H]  ----------------------------<  137[H]  4.1   |  30  |  4.3[HH]    Ca    7.4[L]      27 Sep 2024 06:53  Phos  2.0     09-27  Mg     1.9     09-27    TPro  6.3  /  Alb  2.8[L]  /  TBili  0.4  /  DBili  x   /  AST  6   /  ALT  <5  /  AlkPhos  168[H]  09-27      Urinalysis Basic - ( 27 Sep 2024 06:53 )    Color: x / Appearance: x / SG: x / pH: x  Gluc: 137 mg/dL / Ketone: x  / Bili: x / Urobili: x   Blood: x / Protein: x / Nitrite: x   Leuk Esterase: x / RBC: x / WBC x   Sq Epi: x / Non Sq Epi: x / Bacteria: x            Culture - Fungal, Body Fluid (collected 25 Sep 2024 14:18)  Source: Peritoneal Peritoneal Fluid  Preliminary Report (26 Sep 2024 11:28):    Testing in progress    Culture - Body Fluid with Gram Stain (collected 25 Sep 2024 14:18)  Source: Ascites Fl Ascites Fluid  Gram Stain (26 Sep 2024 01:58):    polymorphonuclear leukocytes seen    No organisms seen    by cytocentrifuge  Preliminary Report (26 Sep 2024 19:22):    No growth              PHYSICAL EXAM:  GEN: No acute distress  LUNGS: Clear to auscultation bilaterally   HEART: S1/S2 present.    ABD: Soft, non-tender, non-distended.   EXT: No pedal edema  NEURO: [id number]

## 2024-09-28 LAB
ALBUMIN SERPL ELPH-MCNC: 3.1 G/DL — LOW (ref 3.5–5.2)
ALP BONE SERPL-MCNC: 34 % — SIGNIFICANT CHANGE UP (ref 12–68)
ALP FLD-CCNC: 143 IU/L — HIGH (ref 44–121)
ALP INTEST CFR SERPL: 1 % — SIGNIFICANT CHANGE UP (ref 0–18)
ALP LIVER SERPL-CCNC: 65 % — SIGNIFICANT CHANGE UP (ref 13–88)
ALP SERPL-CCNC: 143 U/L — HIGH (ref 30–115)
ALT FLD-CCNC: <5 U/L — SIGNIFICANT CHANGE UP (ref 0–41)
ANION GAP SERPL CALC-SCNC: 11 MMOL/L — SIGNIFICANT CHANGE UP (ref 7–14)
AST SERPL-CCNC: <5 U/L — SIGNIFICANT CHANGE UP (ref 0–41)
BASOPHILS # BLD AUTO: 0.05 K/UL — SIGNIFICANT CHANGE UP (ref 0–0.2)
BASOPHILS NFR BLD AUTO: 1.2 % — HIGH (ref 0–1)
BILIRUB SERPL-MCNC: 0.4 MG/DL — SIGNIFICANT CHANGE UP (ref 0.2–1.2)
BUN SERPL-MCNC: 30 MG/DL — HIGH (ref 10–20)
CALCIUM SERPL-MCNC: 7.5 MG/DL — LOW (ref 8.4–10.4)
CHLORIDE SERPL-SCNC: 98 MMOL/L — SIGNIFICANT CHANGE UP (ref 98–110)
CO2 SERPL-SCNC: 30 MMOL/L — SIGNIFICANT CHANGE UP (ref 17–32)
CREAT SERPL-MCNC: 5.4 MG/DL — CRITICAL HIGH (ref 0.7–1.5)
EGFR: 11 ML/MIN/1.73M2 — LOW
EOSINOPHIL # BLD AUTO: 0.25 K/UL — SIGNIFICANT CHANGE UP (ref 0–0.7)
EOSINOPHIL NFR BLD AUTO: 5.9 % — SIGNIFICANT CHANGE UP (ref 0–8)
GLUCOSE BLDC GLUCOMTR-MCNC: 129 MG/DL — HIGH (ref 70–99)
GLUCOSE BLDC GLUCOMTR-MCNC: 175 MG/DL — HIGH (ref 70–99)
GLUCOSE BLDC GLUCOMTR-MCNC: 175 MG/DL — HIGH (ref 70–99)
GLUCOSE SERPL-MCNC: 145 MG/DL — HIGH (ref 70–99)
HCT VFR BLD CALC: 26.7 % — LOW (ref 42–52)
HGB BLD-MCNC: 8.5 G/DL — LOW (ref 14–18)
IMM GRANULOCYTES NFR BLD AUTO: 0.5 % — HIGH (ref 0.1–0.3)
LYMPHOCYTES # BLD AUTO: 0.57 K/UL — LOW (ref 1.2–3.4)
LYMPHOCYTES # BLD AUTO: 13.4 % — LOW (ref 20.5–51.1)
MAGNESIUM SERPL-MCNC: 1.9 MG/DL — SIGNIFICANT CHANGE UP (ref 1.8–2.4)
MCHC RBC-ENTMCNC: 26.4 PG — LOW (ref 27–31)
MCHC RBC-ENTMCNC: 31.8 G/DL — LOW (ref 32–37)
MCV RBC AUTO: 82.9 FL — SIGNIFICANT CHANGE UP (ref 80–94)
MONOCYTES # BLD AUTO: 0.38 K/UL — SIGNIFICANT CHANGE UP (ref 0.1–0.6)
MONOCYTES NFR BLD AUTO: 8.9 % — SIGNIFICANT CHANGE UP (ref 1.7–9.3)
MRSA PCR RESULT.: NEGATIVE — SIGNIFICANT CHANGE UP
MRSA PCR RESULT.: NEGATIVE — SIGNIFICANT CHANGE UP
NEUTROPHILS # BLD AUTO: 2.99 K/UL — SIGNIFICANT CHANGE UP (ref 1.4–6.5)
NEUTROPHILS NFR BLD AUTO: 70.1 % — SIGNIFICANT CHANGE UP (ref 42.2–75.2)
NRBC # BLD: 0 /100 WBCS — SIGNIFICANT CHANGE UP (ref 0–0)
PHOSPHATE SERPL-MCNC: 2.2 MG/DL — SIGNIFICANT CHANGE UP (ref 2.1–4.9)
PLATELET # BLD AUTO: 212 K/UL — SIGNIFICANT CHANGE UP (ref 130–400)
PMV BLD: 10 FL — SIGNIFICANT CHANGE UP (ref 7.4–10.4)
POTASSIUM SERPL-MCNC: 3.9 MMOL/L — SIGNIFICANT CHANGE UP (ref 3.5–5)
POTASSIUM SERPL-SCNC: 3.9 MMOL/L — SIGNIFICANT CHANGE UP (ref 3.5–5)
PROT SERPL-MCNC: 6.1 G/DL — SIGNIFICANT CHANGE UP (ref 6–8)
RBC # BLD: 3.22 M/UL — LOW (ref 4.7–6.1)
RBC # FLD: 17.2 % — HIGH (ref 11.5–14.5)
SODIUM SERPL-SCNC: 139 MMOL/L — SIGNIFICANT CHANGE UP (ref 135–146)
WBC # BLD: 4.26 K/UL — LOW (ref 4.8–10.8)
WBC # FLD AUTO: 4.26 K/UL — LOW (ref 4.8–10.8)

## 2024-09-28 RX ORDER — GUAIFENESIN 100 MG/5ML
100 SOLUTION ORAL ONCE
Refills: 0 | Status: COMPLETED | OUTPATIENT
Start: 2024-09-28 | End: 2024-09-29

## 2024-09-28 RX ORDER — DARBEPOETIN ALFA 100 UG/.5ML
50 INJECTION, SOLUTION INTRAVENOUS; SUBCUTANEOUS
Refills: 0 | Status: DISCONTINUED | OUTPATIENT
Start: 2024-09-28 | End: 2024-10-07

## 2024-09-28 RX ADMIN — FOLIC ACID 1 MILLIGRAM(S): 1 TABLET ORAL at 12:43

## 2024-09-28 RX ADMIN — Medication 5000 UNIT(S): at 05:37

## 2024-09-28 RX ADMIN — Medication 60 MILLIGRAM(S): at 05:38

## 2024-09-28 RX ADMIN — CHLORHEXIDINE GLUCONATE ORAL RINSE 1 APPLICATION(S): 1.2 SOLUTION DENTAL at 06:25

## 2024-09-28 RX ADMIN — PANTOPRAZOLE SODIUM 40 MILLIGRAM(S): 40 TABLET, DELAYED RELEASE ORAL at 05:38

## 2024-09-28 RX ADMIN — Medication 0.4 MILLIGRAM(S): at 21:46

## 2024-09-28 RX ADMIN — FUROSEMIDE 40 MILLIGRAM(S): 10 INJECTION INTRAVENOUS at 05:38

## 2024-09-28 RX ADMIN — Medication 25 MILLIGRAM(S): at 05:38

## 2024-09-28 RX ADMIN — Medication 5000 UNIT(S): at 21:46

## 2024-09-28 RX ADMIN — Medication 1000 MICROGRAM(S): at 12:43

## 2024-09-28 RX ADMIN — Medication 3 UNIT(S): at 08:34

## 2024-09-28 RX ADMIN — ATORVASTATIN CALCIUM 40 MILLIGRAM(S): 10 TABLET, FILM COATED ORAL at 21:46

## 2024-09-28 RX ADMIN — Medication 75 MILLIGRAM(S): at 12:43

## 2024-09-28 RX ADMIN — Medication 5000 UNIT(S): at 13:08

## 2024-09-28 RX ADMIN — Medication 81 MILLIGRAM(S): at 12:43

## 2024-09-28 NOTE — PROGRESS NOTE ADULT - SUBJECTIVE AND OBJECTIVE BOX
Nephrology progress note    THIS IS AN INCOMPLETE NOTE . FULL NOTE TO FOLLOW SHORTLY    Patient is seen and examined, events over the last 24 h noted .    Allergies:  No Known Allergies    Hospital Medications:   MEDICATIONS  (STANDING):  aspirin  chewable 81 milliGRAM(s) Oral daily  atorvastatin 40 milliGRAM(s) Oral at bedtime  chlorhexidine 2% Cloths 1 Application(s) Topical <User Schedule>  clopidogrel Tablet 75 milliGRAM(s) Oral daily  cyanocobalamin 1000 MICROGram(s) Oral daily  dextrose 5%. 1000 milliLiter(s) (50 mL/Hr) IV Continuous <Continuous>  dextrose 5%. 1000 milliLiter(s) (100 mL/Hr) IV Continuous <Continuous>  dextrose 50% Injectable 25 Gram(s) IV Push once  dextrose 50% Injectable 12.5 Gram(s) IV Push once  dextrose 50% Injectable 25 Gram(s) IV Push once  folic acid 1 milliGRAM(s) Oral daily  furosemide    Tablet 40 milliGRAM(s) Oral daily  glucagon  Injectable 1 milliGRAM(s) IntraMuscular once  heparin   Injectable 5000 Unit(s) SubCutaneous every 8 hours  influenza  Vaccine (HIGH DOSE) 0.5 milliLiter(s) IntraMuscular once  insulin lispro Injectable (ADMELOG) 3 Unit(s) SubCutaneous before dinner  insulin lispro Injectable (ADMELOG) 3 Unit(s) SubCutaneous before breakfast  metoprolol succinate ER 25 milliGRAM(s) Oral daily  NIFEdipine XL 60 milliGRAM(s) Oral daily  pantoprazole    Tablet 40 milliGRAM(s) Oral before breakfast  senna 2 Tablet(s) Oral at bedtime  tamsulosin 0.4 milliGRAM(s) Oral at bedtime        VITALS:  T(F): 97.6 (09-28-24 @ 00:00), Max: 97.9 (09-27-24 @ 15:52)  HR: 75 (09-28-24 @ 05:00)  BP: 132/63 (09-28-24 @ 05:00)  RR: 18 (09-28-24 @ 00:00)  SpO2: 96% (09-28-24 @ 00:00)  Wt(kg): --    09-26 @ 07:01  -  09-27 @ 07:00  --------------------------------------------------------  IN: 0 mL / OUT: 2000 mL / NET: -2000 mL    09-27 @ 07:01  -  09-28 @ 07:00  --------------------------------------------------------  IN: 0 mL / OUT: 6000 mL / NET: -6000 mL          PHYSICAL EXAM:  Constitutional: NAD  HEENT: anicteric sclera, oropharynx clear, MMM  Neck: No JVD  Respiratory: CTAB, no wheezes, rales or rhonchi  Cardiovascular: S1, S2, RRR  Gastrointestinal: BS+, soft, NT/ND  Extremities: No cyanosis or clubbing. No peripheral edema  :  No schneider.   Skin: No rashes    LABS:  09-27    137  |  97[L]  |  22[H]  ----------------------------<  137[H]  4.1   |  30  |  4.3[HH]    Ca    7.4[L]      27 Sep 2024 06:53  Phos  2.0     09-27  Mg     1.9     09-27    TPro  6.3  /  Alb  2.8[L]  /  TBili  0.4  /  DBili      /  AST  6   /  ALT  <5  /  AlkPhos  168[H]  09-27                          8.5    4.95  )-----------( 211      ( 27 Sep 2024 06:53 )             27.4       Urine Studies:  Urinalysis Basic - ( 27 Sep 2024 06:53 )    Color:  / Appearance:  / SG:  / pH:   Gluc: 137 mg/dL / Ketone:   / Bili:  / Urobili:    Blood:  / Protein:  / Nitrite:    Leuk Esterase:  / RBC:  / WBC    Sq Epi:  / Non Sq Epi:  / Bacteria:           PTH -- (Ca 7.8)      [09-24-24 @ 06:56]   135  Vitamin D (25OH) 15      [09-25-24 @ 06:49]  HbA1c 5.8      [01-30-20 @ 06:46]    HBsAb <3.0      [12-29-19 @ 17:44]  HBsAb Nonreact      [11-03-22 @ 06:40]  HBsAg Nonreact      [11-03-22 @ 06:40]  HBcAb Nonreact      [11-03-22 @ 06:40]  HCV 0.14, Nonreact      [10-27-22 @ 04:09]        RADIOLOGY & ADDITIONAL STUDIES:   Nephrology progress note    Patient is seen and examined, events over the last 24 h noted .  seen during HD   lying in bed comfortable     Allergies:  No Known Allergies    Hospital Medications:   MEDICATIONS  (STANDING):  aspirin  chewable 81 milliGRAM(s) Oral daily  atorvastatin 40 milliGRAM(s) Oral at bedtime  clopidogrel Tablet 75 milliGRAM(s) Oral daily  cyanocobalamin 1000 MICROGram(s) Oral daily  folic acid 1 milliGRAM(s) Oral daily  furosemide    Tablet 40 milliGRAM(s) Oral daily  glucagon  Injectable 1 milliGRAM(s) IntraMuscular once  heparin   Injectable 5000 Unit(s) SubCutaneous every 8 hours  influenza  Vaccine (HIGH DOSE) 0.5 milliLiter(s) IntraMuscular once  insulin lispro Injectable (ADMELOG) 3 Unit(s) SubCutaneous before dinner  insulin lispro Injectable (ADMELOG) 3 Unit(s) SubCutaneous before breakfast  metoprolol succinate ER 25 milliGRAM(s) Oral daily  NIFEdipine XL 60 milliGRAM(s) Oral daily  pantoprazole    Tablet 40 milliGRAM(s) Oral before breakfast  senna 2 Tablet(s) Oral at bedtime  tamsulosin 0.4 milliGRAM(s) Oral at bedtime        VITALS:  T(F): 97.6 (09-28-24 @ 00:00), Max: 97.9 (09-27-24 @ 15:52)  HR: 75 (09-28-24 @ 05:00)  BP: 132/63 (09-28-24 @ 05:00)  RR: 18 (09-28-24 @ 00:00)      09-26 @ 07:01  -  09-27 @ 07:00  --------------------------------------------------------  IN: 0 mL / OUT: 2000 mL / NET: -2000 mL    09-27 @ 07:01  -  09-28 @ 07:00  --------------------------------------------------------  IN: 0 mL / OUT: 6000 mL / NET: -6000 mL          PHYSICAL EXAM:  Constitutional: NAD  Respiratory: CTAB, no wheezes, rales or rhonchi  Cardiovascular: S1, S2, RRR  Gastrointestinal: BS+, soft, NT/ND  Extremities: No cyanosis or clubbing. No peripheral edema  :  No schneider.   Skin: No rashes    LABS:  09-27    137  |  97[L]  |  22[H]  ----------------------------<  137[H]  4.1   |  30  |  4.3[HH]    Ca    7.4[L]      27 Sep 2024 06:53  Phos  2.0     09-27  Mg     1.9     09-27    TPro  6.3  /  Alb  2.8[L]  /  TBili  0.4  /  DBili      /  AST  6   /  ALT  <5  /  AlkPhos  168[H]  09-27                          8.5    4.95  )-----------( 211      ( 27 Sep 2024 06:53 )             27.4       Urine Studies:  Urinalysis Basic - ( 27 Sep 2024 06:53 )    Color:  / Appearance:  / SG:  / pH:   Gluc: 137 mg/dL / Ketone:   / Bili:  / Urobili:    Blood:  / Protein:  / Nitrite:    Leuk Esterase:  / RBC:  / WBC    Sq Epi:  / Non Sq Epi:  / Bacteria:           PTH -- (Ca 7.8)      [09-24-24 @ 06:56]   135  Vitamin D (25OH) 15      [09-25-24 @ 06:49]  HbA1c 5.8      [01-30-20 @ 06:46]    HBsAb <3.0      [12-29-19 @ 17:44]  HBsAb Nonreact      [11-03-22 @ 06:40]  HBsAg Nonreact      [11-03-22 @ 06:40]  HBcAb Nonreact      [11-03-22 @ 06:40]  HCV 0.14, Nonreact      [10-27-22 @ 04:09]        RADIOLOGY & ADDITIONAL STUDIES:

## 2024-09-28 NOTE — PROGRESS NOTE ADULT - SUBJECTIVE AND OBJECTIVE BOX
SUBJECTIVE/OVERNIGHT EVENTS  Today is hospital day 5d. This morning patient was seen and examined at bedside, resting comfortably in bed. No acute or major events overnight. Had an episode of vomiting this morning.    HOSPITAL COURSE  Day 1:   Day 2:   Day 3:     CODE STATUS:    FAMILY COMMUNICATION  Contact date:  Name of person contacted:  Relationship to patient:  Communication details:    MEDICATIONS  STANDING MEDICATIONS  aspirin  chewable 81 milliGRAM(s) Oral daily  atorvastatin 40 milliGRAM(s) Oral at bedtime  chlorhexidine 2% Cloths 1 Application(s) Topical <User Schedule>  clopidogrel Tablet 75 milliGRAM(s) Oral daily  cyanocobalamin 1000 MICROGram(s) Oral daily  dextrose 5%. 1000 milliLiter(s) IV Continuous <Continuous>  dextrose 5%. 1000 milliLiter(s) IV Continuous <Continuous>  dextrose 50% Injectable 25 Gram(s) IV Push once  dextrose 50% Injectable 12.5 Gram(s) IV Push once  dextrose 50% Injectable 25 Gram(s) IV Push once  folic acid 1 milliGRAM(s) Oral daily  furosemide    Tablet 40 milliGRAM(s) Oral daily  glucagon  Injectable 1 milliGRAM(s) IntraMuscular once  heparin   Injectable 5000 Unit(s) SubCutaneous every 8 hours  influenza  Vaccine (HIGH DOSE) 0.5 milliLiter(s) IntraMuscular once  insulin lispro Injectable (ADMELOG) 3 Unit(s) SubCutaneous before breakfast  insulin lispro Injectable (ADMELOG) 3 Unit(s) SubCutaneous before dinner  metoprolol succinate ER 25 milliGRAM(s) Oral daily  NIFEdipine XL 60 milliGRAM(s) Oral daily  pantoprazole    Tablet 40 milliGRAM(s) Oral before breakfast  senna 2 Tablet(s) Oral at bedtime  tamsulosin 0.4 milliGRAM(s) Oral at bedtime    PRN MEDICATIONS  acetaminophen     Tablet .. 650 milliGRAM(s) Oral every 6 hours PRN  artificial  tears Solution 1 Drop(s) Both EYES every 2 hours PRN  dextrose Oral Gel 15 Gram(s) Oral once PRN  ondansetron Injectable 4 milliGRAM(s) IV Push every 6 hours PRN    VITALS  T(F): 97.6 (09-28-24 @ 08:19), Max: 97.9 (09-27-24 @ 15:52)  HR: 72 (09-28-24 @ 12:15) (69 - 79)  BP: 101/59 (09-28-24 @ 12:15) (101/59 - 136/64)  RR: 18 (09-28-24 @ 12:15) (18 - 18)  SpO2: 97% (09-28-24 @ 08:19) (96% - 97%)  POCT Blood Glucose.: 175 mg/dL (09-28-24 @ 07:47)  POCT Blood Glucose.: 158 mg/dL (09-27-24 @ 20:59)  POCT Blood Glucose.: 201 mg/dL (09-27-24 @ 16:54)    PHYSICAL EXAM  CONSTITUTIONAL: Well groomed, no apparent distress  EYES: icteric sclera  ENMT: Oral mucosa with moist membranes.  RESP: CTAB B/L ; No respiratory distress, no use of accessory muscles  CV: RRR, +S1S2; no peripheral edema  GI: Soft, NT, distended, no rebound, no guarding; no palpable masses  SKIN: No rashes or ulcers noted  PSYCH: Appropriate insight/judgment; A+O x 3, mood and affect appropriate, recent/remote memory intact      (  ) Indwelling Lacey Catheter   Date insterted:    Reason (  ) Critical illness     (  ) urinary retention    (  ) Accurate Ins/Outs Monitoring     (  ) CMO patient    (  ) Central Line  Date inserted:  Location: (  ) Right IJ   (  ) Left IJ   (  ) Right Fem   (  ) Left Fem    (  ) SPC  (  ) pigtail  (  ) PEG tube  (  ) colostomy  (  ) jejunostomy  (  ) U-Dall    LABS             8.5    4.26  )-----------( 212      ( 09-28-24 @ 07:58 )             26.7     139  |  98  |  30  -------------------------<  145   09-28-24 @ 07:58  3.9  |  30  |  5.4    Ca      7.5     09-28-24 @ 07:58  Phos   2.2     09-28-24 @ 07:58  Mg     1.9     09-28-24 @ 07:58    TPro  6.1  /  Alb  3.1  /  TBili  0.4  /  DBili  x   /  AST  <5  /  ALT  <5  /  AlkPhos  143  /  GGT  x     09-28-24 @ 07:58        Urinalysis Basic - ( 28 Sep 2024 07:58 )    Color: x / Appearance: x / SG: x / pH: x  Gluc: 145 mg/dL / Ketone: x  / Bili: x / Urobili: x   Blood: x / Protein: x / Nitrite: x   Leuk Esterase: x / RBC: x / WBC x   Sq Epi: x / Non Sq Epi: x / Bacteria: x          Culture - Fungal, Body Fluid (collected 25 Sep 2024 14:18)  Source: Peritoneal Peritoneal Fluid  Preliminary Report (26 Sep 2024 11:28):    Testing in progress    Culture - Body Fluid with Gram Stain (collected 25 Sep 2024 14:18)  Source: Ascites Fl Ascites Fluid  Gram Stain (26 Sep 2024 01:58):    polymorphonuclear leukocytes seen    No organisms seen    by cytocentrifuge  Preliminary Report (26 Sep 2024 19:22):    No growth      IMAGING

## 2024-09-28 NOTE — PROGRESS NOTE ADULT - ASSESSMENT
64 year old man from [facility] with a past medical history of ESRD on hemodialysis [facility] (last dialysis on 9/20/24), NIDDM, BPH, DM, CAD s/p  CABG, HTN, CHF, TIA, PAD, left toe ulcer s/p amputation sent in by PCP (Dr. Olivier) for increasing abdomen distention over the last 3 weeks. Patient with increase abdominal girth despite weight loss. Patient's weight done 20 lbs in the last month    #Nausea, Vomiting, Abdominal discomfort  resolved  #Ascites : Likely Nephrogenic Ascites, No evidence of Cirrhosis  - GI / Hepatology evaluation noted  - paracentesis done with removal of 4 liters 9/25, no evidence of SBP, paracentesis repeated today 9/27 with removal of 6 liters  - abdominal girth still increased, will benefit from another tap, call  - F/u hepatology    #ESRD  - continue HMD  - weight continues trending down  - leg edema improved  - Pending nephro recs    #CAD, post CABG, CHF  on rx  - await 2d echo results    #PAD, hx of toe amputation  - podiatry follows as outpatient    Pending: hepatology follow up  GI prophylaxsis  DVT Heparin  Diet renal

## 2024-09-28 NOTE — PROGRESS NOTE ADULT - SUBJECTIVE AND OBJECTIVE BOX
SCHWABACHER, LAWRENCE  64y  Male      Patient is a 64y old  Male who presents with a chief complaint of Abdominal distension, pain w N/V (25 Sep 2024 21:43)        REVIEW OF SYSTEMS:  CONSTITUTIONAL: No fever, weight loss, and fatigue+  RESPIRATORY: No cough, wheezing, chills or hemoptysis; No shortness of breath  CARDIOVASCULAR: No chest pain, palpitations, dizziness, or leg swelling  GASTROINTESTINAL: No abdominal or epigastric pain. No nausea, vomiting, or hematemesis; No diarrhea or constipation. No melena or hematochezia.  GENITOURINARY: No dysuria, frequency, hematuria,  incontinence+  MUSCULOSKELETAL: No joint pain or swelling; No muscle, back, or extremity pain  PSYCHIATRIC: No depression, anxiety, mood swings, or difficulty sleeping  HEME/LYMPH: No easy bruising, or bleeding gums  ALLERY AND IMMUNOLOGIC: No hives or eczema  FAMILY HISTORY:  Family history of heart disease (Father)    DM (diabetes mellitus) (Mother)    ESRD (end stage renal disease) on dialysis (Mother)      T(C): 36.4 (09-28-24 @ 08:19), Max: 36.6 (09-27-24 @ 15:52)  HR: 69 (09-28-24 @ 09:15) (59 - 79)  BP: 107/60 (09-28-24 @ 09:15) (107/60 - 136/64)  RR: 18 (09-28-24 @ 09:15) (18 - 18)  SpO2: 97% (09-28-24 @ 08:19) (96% - 97%)  Wt(kg): --Vital Signs Last 24 Hrs  T(C): 36.4 (28 Sep 2024 08:19), Max: 36.6 (27 Sep 2024 15:52)  T(F): 97.6 (28 Sep 2024 08:19), Max: 97.9 (27 Sep 2024 15:52)  HR: 69 (28 Sep 2024 09:15) (59 - 79)  BP: 107/60 (28 Sep 2024 09:15) (107/60 - 136/64)  BP(mean): --  RR: 18 (28 Sep 2024 09:15) (18 - 18)  SpO2: 97% (28 Sep 2024 08:19) (96% - 97%)    Parameters below as of 28 Sep 2024 09:15  Patient On (Oxygen Delivery Method): room air      No Known Allergies      PHYSICAL EXAM:  GENERAL: NAD,   HEAD:  Atraumatic, Normocephalic  EYES: EOMI, PERRLA, conjunctiva and sclera clear  ENMT: No tonsillar erythema, exudates, or enlargement;   NECK: Supple, No JVD, Normal thyroid  NERVOUS SYSTEM:  Alert & Oriented  CHEST/LUNG: vbs  HEART: Regular rate and rhythm; No murmurs, rubs, or gallops  ABDOMEN: Soft, Nontender, Nondistended; Bowel sounds present  EXTREMITIES:  , No clubbing, cyanosis, or edema,charcot joint deformity+  LYMPH: No lymphadenopathy noted  SKIN: No rashes or lesions      LABS:  09-28    139  |  98  |  30[H]  ----------------------------<  145[H]  3.9   |  30  |  5.4[HH]    Ca    7.5[L]      28 Sep 2024 07:58  Phos  2.2     09-28  Mg     1.9     09-28    TPro  6.1  /  Alb  3.1[L]  /  TBili  0.4  /  DBili  x   /  AST  <5  /  ALT  <5  /  AlkPhos  143[H]  09-28                          8.5    4.26  )-----------( 212      ( 28 Sep 2024 07:58 )             26.7   < from: CT Abdomen and Pelvis w/ Oral Cont and w/ IV Cont (09.16.24 @ 19:00) >  1. Since March 28, 2024, no significant change in large volume   abdominopelvic ascites..  2. Moderate to large right inguinal hernia contains ascites fluid and a   short segment of nonobstructed cecum and distal ileum.  3. Unchanged small left and trace right pleural effusions with small   partially imaged bibasilar opacities.  4. A 3.6 cm hyperattenuating focus within the lateral aspect of the right   gluteus muscle is suspicious for small hematoma; correlation with   clinical history and exam is suggested.      < end of copied text >      < from: TTE Echo Complete w/ Contrast w/o Doppler (09.24.24 @ 09:18) >   1. Normal left ventricular internal cavity size.   2. Normal global left ventricular systolic function.   3. LV Ejection Fraction by Jose's Method with a biplane EF of 58 %.   4. Normal right ventricular size and function.   5. Mildly enlarged left atrium.   6. Mild tricuspid regurgitation.   7. Moderate pleural effusion in the left lateral region.   8. 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 This could not be assessed on   this study as image enhancer was not used.      < end of copied text >    RADIOLOGY & ADDITIONAL TESTS:    MEDICATION:  acetaminophen     Tablet .. 650 milliGRAM(s) Oral every 6 hours PRN  artificial  tears Solution 1 Drop(s) Both EYES every 2 hours PRN  aspirin  chewable 81 milliGRAM(s) Oral daily  atorvastatin 40 milliGRAM(s) Oral at bedtime  chlorhexidine 2% Cloths 1 Application(s) Topical <User Schedule>  clopidogrel Tablet 75 milliGRAM(s) Oral daily  cyanocobalamin 1000 MICROGram(s) Oral daily  dextrose 5%. 1000 milliLiter(s) IV Continuous <Continuous>  dextrose 5%. 1000 milliLiter(s) IV Continuous <Continuous>  dextrose 50% Injectable 25 Gram(s) IV Push once  dextrose 50% Injectable 12.5 Gram(s) IV Push once  dextrose 50% Injectable 25 Gram(s) IV Push once  dextrose Oral Gel 15 Gram(s) Oral once PRN  folic acid 1 milliGRAM(s) Oral daily  furosemide    Tablet 40 milliGRAM(s) Oral daily  glucagon  Injectable 1 milliGRAM(s) IntraMuscular once  heparin   Injectable 5000 Unit(s) SubCutaneous every 8 hours  influenza  Vaccine (HIGH DOSE) 0.5 milliLiter(s) IntraMuscular once  insulin lispro Injectable (ADMELOG) 3 Unit(s) SubCutaneous before dinner  insulin lispro Injectable (ADMELOG) 3 Unit(s) SubCutaneous before breakfast  metoprolol succinate ER 25 milliGRAM(s) Oral daily  NIFEdipine XL 60 milliGRAM(s) Oral daily  ondansetron Injectable 4 milliGRAM(s) IV Push every 6 hours PRN  pantoprazole    Tablet 40 milliGRAM(s) Oral before breakfast  senna 2 Tablet(s) Oral at bedtime  tamsulosin 0.4 milliGRAM(s) Oral at bedtime      HEALTH ISSUES - PROBLEM Dx:  64 year old man from [facility] with a past medical history of ESRD on hemodialysis [facility] (last dialysis on 9/20/24), NIDDM, BPH, DM, CAD s/p  CABG, HTN, CHF, TIA, PAD, left toe ulcer s/p amputation sent in by PCP (Dr. Olivier) for increasing abdomen distention over the last 3 weeks. Patient with increase abdominal girth despite weight loss. 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, No evidence of Cirrhosis  - GI / Hepatology evaluation noted  - paracentesis done with removal of 4 liters 9/25, no evidence of SBP, paracentesis repeated today 9/27 with removal of 6 liters  - abdominal girth still increased, will benefit from another tap,     ESRD  - continue HMD  - weight continues trending down  - leg edema improved    CAD, post CABG, CHF  on rx  - EF 58% ,hx lt apical aneurysm pl repeat with constrast    PAD, hx of toe amputation  - podiatry follows as outpatient   # HTN his BP low ,will lower nifedipine  Pending: hepatology follow up  GI prophylaxsis  DVT Heparin  Diet resumed

## 2024-09-28 NOTE — PROGRESS NOTE ADULT - ASSESSMENT
64-year-old man with a past medical history of ESRD on hemodialysis MWF (last dialysis on 9/20/24), NIDDM, BPH,DM, CAD s/p  CABG, HTN, CHF, TIA, PAD, left toe ulcer s/p amputation sent in by PCP (Dr. Olivier) for increasing abdomen distention over the last 3 weeks.    HD today 3h opti 160 UF 2l   phos noted, no binders  BP controlled    s/p paracentesis with 4 liters fluid removal 9/25 for repeat tap today  h/h noted resume RAMSEY with HD on Tuesday  / check iron stores   will follow

## 2024-09-29 LAB
ALBUMIN SERPL ELPH-MCNC: 3 G/DL — LOW (ref 3.5–5.2)
ALP SERPL-CCNC: 150 U/L — HIGH (ref 30–115)
ALT FLD-CCNC: <5 U/L — SIGNIFICANT CHANGE UP (ref 0–41)
ANION GAP SERPL CALC-SCNC: 11 MMOL/L — SIGNIFICANT CHANGE UP (ref 7–14)
AST SERPL-CCNC: 6 U/L — SIGNIFICANT CHANGE UP (ref 0–41)
BASOPHILS # BLD AUTO: 0.07 K/UL — SIGNIFICANT CHANGE UP (ref 0–0.2)
BASOPHILS NFR BLD AUTO: 1.6 % — HIGH (ref 0–1)
BILIRUB SERPL-MCNC: 0.4 MG/DL — SIGNIFICANT CHANGE UP (ref 0.2–1.2)
BUN SERPL-MCNC: 21 MG/DL — HIGH (ref 10–20)
CALCIUM SERPL-MCNC: 7.4 MG/DL — LOW (ref 8.4–10.5)
CHLORIDE SERPL-SCNC: 96 MMOL/L — LOW (ref 98–110)
CO2 SERPL-SCNC: 27 MMOL/L — SIGNIFICANT CHANGE UP (ref 17–32)
CREAT SERPL-MCNC: 4.4 MG/DL — CRITICAL HIGH (ref 0.7–1.5)
EGFR: 14 ML/MIN/1.73M2 — LOW
EOSINOPHIL # BLD AUTO: 0.23 K/UL — SIGNIFICANT CHANGE UP (ref 0–0.7)
EOSINOPHIL NFR BLD AUTO: 5.3 % — SIGNIFICANT CHANGE UP (ref 0–8)
GLUCOSE BLDC GLUCOMTR-MCNC: 144 MG/DL — HIGH (ref 70–99)
GLUCOSE BLDC GLUCOMTR-MCNC: 175 MG/DL — HIGH (ref 70–99)
GLUCOSE BLDC GLUCOMTR-MCNC: 180 MG/DL — HIGH (ref 70–99)
GLUCOSE BLDC GLUCOMTR-MCNC: 188 MG/DL — HIGH (ref 70–99)
GLUCOSE SERPL-MCNC: 120 MG/DL — HIGH (ref 70–99)
HCT VFR BLD CALC: 28.1 % — LOW (ref 42–52)
HGB BLD-MCNC: 8.9 G/DL — LOW (ref 14–18)
IMM GRANULOCYTES NFR BLD AUTO: 0.2 % — SIGNIFICANT CHANGE UP (ref 0.1–0.3)
LYMPHOCYTES # BLD AUTO: 0.63 K/UL — LOW (ref 1.2–3.4)
LYMPHOCYTES # BLD AUTO: 14.4 % — LOW (ref 20.5–51.1)
MAGNESIUM SERPL-MCNC: 1.7 MG/DL — LOW (ref 1.8–2.4)
MCHC RBC-ENTMCNC: 26.5 PG — LOW (ref 27–31)
MCHC RBC-ENTMCNC: 31.7 G/DL — LOW (ref 32–37)
MCV RBC AUTO: 83.6 FL — SIGNIFICANT CHANGE UP (ref 80–94)
MONOCYTES # BLD AUTO: 0.41 K/UL — SIGNIFICANT CHANGE UP (ref 0.1–0.6)
MONOCYTES NFR BLD AUTO: 9.4 % — HIGH (ref 1.7–9.3)
NEUTROPHILS # BLD AUTO: 3.03 K/UL — SIGNIFICANT CHANGE UP (ref 1.4–6.5)
NEUTROPHILS NFR BLD AUTO: 69.1 % — SIGNIFICANT CHANGE UP (ref 42.2–75.2)
NRBC # BLD: 0 /100 WBCS — SIGNIFICANT CHANGE UP (ref 0–0)
PHOSPHATE SERPL-MCNC: 2 MG/DL — LOW (ref 2.1–4.9)
PLATELET # BLD AUTO: 221 K/UL — SIGNIFICANT CHANGE UP (ref 130–400)
PMV BLD: 10.5 FL — HIGH (ref 7.4–10.4)
POTASSIUM SERPL-MCNC: 3.9 MMOL/L — SIGNIFICANT CHANGE UP (ref 3.5–5)
POTASSIUM SERPL-SCNC: 3.9 MMOL/L — SIGNIFICANT CHANGE UP (ref 3.5–5)
PROT SERPL-MCNC: 6.1 G/DL — SIGNIFICANT CHANGE UP (ref 6–8)
RBC # BLD: 3.36 M/UL — LOW (ref 4.7–6.1)
RBC # FLD: 17.3 % — HIGH (ref 11.5–14.5)
SODIUM SERPL-SCNC: 134 MMOL/L — LOW (ref 135–146)
WBC # BLD: 4.38 K/UL — LOW (ref 4.8–10.8)
WBC # FLD AUTO: 4.38 K/UL — LOW (ref 4.8–10.8)

## 2024-09-29 PROCEDURE — 93306 TTE W/DOPPLER COMPLETE: CPT | Mod: 26

## 2024-09-29 RX ADMIN — Medication 25 MILLIGRAM(S): at 05:56

## 2024-09-29 RX ADMIN — Medication 0.4 MILLIGRAM(S): at 22:55

## 2024-09-29 RX ADMIN — PANTOPRAZOLE SODIUM 40 MILLIGRAM(S): 40 TABLET, DELAYED RELEASE ORAL at 05:56

## 2024-09-29 RX ADMIN — Medication 5000 UNIT(S): at 05:55

## 2024-09-29 RX ADMIN — FUROSEMIDE 40 MILLIGRAM(S): 10 INJECTION INTRAVENOUS at 05:55

## 2024-09-29 RX ADMIN — Medication 1000 MICROGRAM(S): at 11:00

## 2024-09-29 RX ADMIN — Medication 3 UNIT(S): at 16:57

## 2024-09-29 RX ADMIN — ATORVASTATIN CALCIUM 40 MILLIGRAM(S): 10 TABLET, FILM COATED ORAL at 22:55

## 2024-09-29 RX ADMIN — GUAIFENESIN 100 MILLIGRAM(S): 100 SOLUTION ORAL at 05:55

## 2024-09-29 RX ADMIN — Medication 60 MILLIGRAM(S): at 05:54

## 2024-09-29 RX ADMIN — Medication 81 MILLIGRAM(S): at 11:00

## 2024-09-29 RX ADMIN — Medication 5000 UNIT(S): at 22:55

## 2024-09-29 RX ADMIN — Medication 75 MILLIGRAM(S): at 10:59

## 2024-09-29 RX ADMIN — CHLORHEXIDINE GLUCONATE ORAL RINSE 1 APPLICATION(S): 1.2 SOLUTION DENTAL at 05:55

## 2024-09-29 RX ADMIN — FOLIC ACID 1 MILLIGRAM(S): 1 TABLET ORAL at 10:59

## 2024-09-29 RX ADMIN — Medication 2 TABLET(S): at 22:55

## 2024-09-29 NOTE — PROGRESS NOTE ADULT - SUBJECTIVE AND OBJECTIVE BOX
SCHWABACHER, LAWRENCE  64y  Male      Patient is a 64y old  Male who presents with a chief complaint of ab distention (29 Sep 2024 08:09)        REVIEW OF SYSTEMS:  CONSTITUTIONAL: No fever, weight loss, and fatigue+  EYES: No eye pain, visual disturbances, or discharge  ENMT:  No difficulty hearing, tinnitus, vertigo; No sinus or throat pain  NECK: No pain or stiffness  BREASTS: No pain, masses, or nipple discharge  RESPIRATORY: No cough, wheezing, chills or hemoptysis; No shortness of breath  CARDIOVASCULAR: No chest pain, palpitations, dizziness, or leg swelling  GASTROINTESTINAL: No abdominal or epigastric pain. No nausea, vomiting, or hematemesis; No diarrhea or constipation. No melena or hematochezia.  GENITOURINARY: No dysuria, frequency, hematuria, or incontinence  PSYCHIATRIC: No depression, anxiety, mood swings, or difficulty sleeping  HEME/LYMPH: No easy bruising, or bleeding gums  ALLERY AND IMMUNOLOGIC: No hives or eczema  FAMILY HISTORY:  Family history of heart disease (Father)    DM (diabetes mellitus) (Mother)    ESRD (end stage renal disease) on dialysis (Mother)      T(C): 37.1 (09-29-24 @ 08:35), Max: 37.1 (09-29-24 @ 08:35)  HR: 77 (09-29-24 @ 08:35) (69 - 80)  BP: 128/72 (09-29-24 @ 08:35) (101/59 - 140/63)  RR: 17 (09-29-24 @ 08:35) (17 - 18)  SpO2: 98% (09-29-24 @ 08:35) (95% - 98%)  Wt(kg): --Vital Signs Last 24 Hrs  T(C): 37.1 (29 Sep 2024 08:35), Max: 37.1 (29 Sep 2024 08:35)  T(F): 98.7 (29 Sep 2024 08:35), Max: 98.7 (29 Sep 2024 08:35)  HR: 77 (29 Sep 2024 08:35) (69 - 80)  BP: 128/72 (29 Sep 2024 08:35) (101/59 - 140/63)  BP(mean): 71 (28 Sep 2024 16:15) (71 - 71)  RR: 17 (29 Sep 2024 08:35) (17 - 18)  SpO2: 98% (29 Sep 2024 08:35) (95% - 98%)    Parameters below as of 29 Sep 2024 08:35  Patient On (Oxygen Delivery Method): room air      No Known Allergies      PHYSICAL EXAM:  GENERAL: NAD,   HEAD:  Atraumatic, Normocephalic  EYES: EOMI, PERRLA, conjunctiva and sclera clear  ENMT: No tonsillar erythema, exudates, or enlargement;   NECK: Supple, No JVD, Normal thyroid  NERVOUS SYSTEM:  Alert & Oriented X3,   CHEST/LUNG: Clear to percussion bilaterally; No rales, rhonchi, wheezing, or rubs  HEART: Regular rate and rhythm; No murmurs, rubs, or gallops  ABDOMEN: Soft, Nontender, slightly distended  EXTREMITIES:  , No clubbing, cyanosis, or edema  LYMPH: No lymphadenopathy noted  SKIN: No rashes or lesions      LABS:  09-29    134[L]  |  96[L]  |  21[H]  ----------------------------<  120[H]  3.9   |  27  |  4.4[HH]    Ca    7.4[L]      29 Sep 2024 06:20  Phos  2.0     09-29  Mg     1.7     09-29    TPro  6.1  /  Alb  3.0[L]  /  TBili  0.4  /  DBili  x   /  AST  6   /  ALT  <5  /  AlkPhos  150[H]  09-29                          8.9    4.38  )-----------( 221      ( 29 Sep 2024 06:20 )             28.1         RADIOLOGY & ADDITIONAL TESTS:    MEDICATION:  acetaminophen     Tablet .. 650 milliGRAM(s) Oral every 6 hours PRN  artificial  tears Solution 1 Drop(s) Both EYES every 2 hours PRN  aspirin  chewable 81 milliGRAM(s) Oral daily  atorvastatin 40 milliGRAM(s) Oral at bedtime  chlorhexidine 2% Cloths 1 Application(s) Topical <User Schedule>  clopidogrel Tablet 75 milliGRAM(s) Oral daily  cyanocobalamin 1000 MICROGram(s) Oral daily  darbepoetin Injectable Syringe 50 MICROGram(s) IV Push <User Schedule>  dextrose 5%. 1000 milliLiter(s) IV Continuous <Continuous>  dextrose 5%. 1000 milliLiter(s) IV Continuous <Continuous>  dextrose 50% Injectable 25 Gram(s) IV Push once  dextrose 50% Injectable 12.5 Gram(s) IV Push once  dextrose 50% Injectable 25 Gram(s) IV Push once  dextrose Oral Gel 15 Gram(s) Oral once PRN  folic acid 1 milliGRAM(s) Oral daily  furosemide    Tablet 40 milliGRAM(s) Oral daily  glucagon  Injectable 1 milliGRAM(s) IntraMuscular once  heparin   Injectable 5000 Unit(s) SubCutaneous every 8 hours  influenza  Vaccine (HIGH DOSE) 0.5 milliLiter(s) IntraMuscular once  insulin lispro Injectable (ADMELOG) 3 Unit(s) SubCutaneous before breakfast  insulin lispro Injectable (ADMELOG) 3 Unit(s) SubCutaneous before dinner  metoprolol succinate ER 25 milliGRAM(s) Oral daily  NIFEdipine XL 60 milliGRAM(s) Oral daily  ondansetron Injectable 4 milliGRAM(s) IV Push every 6 hours PRN  pantoprazole    Tablet 40 milliGRAM(s) Oral before breakfast  senna 2 Tablet(s) Oral at bedtime  tamsulosin 0.4 milliGRAM(s) Oral at bedtime      HEALTH ISSUES - PROBLEM Dx:  64 year old man from [facility] with a past medical history of ESRD on hemodialysis [facility] (last dialysis on 9/20/24), NIDDM, BPH, DM, CAD s/p  CABG, HTN, CHF, TIA, PAD, left toe ulcer s/p amputation sent in by PCP (Dr. Olivier) for increasing abdomen distention over the last 3 weeks. Patient with increase abdominal girth despite weight loss. 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, No evidence of Cirrhosis  - GI / Hepatology evaluation noted  - paracentesis done with removal of 4 liters 9/25, no evidence of SBP, paracentesis repeated today 9/27 with removal of 6 liters  - abdominal girth still increased, will benefit from another tap,     ESRD  - continue HMD  - weight continues trending down  - leg edema improved    CAD, post CABG, CHF  on rx  - EF 58% ,hx lt apical aneurysm pl repeat with constrast    PAD, hx of toe amputation- podiatry follows as outpatient   # HTN his BP low ,will lower nifedipine  Pending: hepatology follow up  GI prophylaxsis  DVT Heparin  Diet resumed

## 2024-09-29 NOTE — PROGRESS NOTE ADULT - ASSESSMENT
64 year old man from [facility] with a past medical history of ESRD on hemodialysis [facility] (last dialysis on 9/20/24), NIDDM, BPH, DM, CAD s/p  CABG, HTN, CHF, TIA, PAD, left toe ulcer s/p amputation sent in by PCP (Dr. Olivier) for increasing abdomen distention over the last 3 weeks. Patient with increase abdominal girth despite weight loss. 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   - F/u hepatology rec 9/29    #ESRD  - continue HMD  - weight continues trending down  - leg edema improved  - nephro following    #CAD, post CABG, CHF  - 2d echo results:  1. Normal left ventricular internal cavity size.   2. Normal global left ventricular systolic function.   3. LV Ejection Fraction by Jose's Method with a biplane EF of 58 %.   4. Normal right ventricular size and function.   5. Mildly enlarged left atrium.   6. Mild tricuspid regurgitation.   7. Moderate pleural effusion in the left lateral region.   8. Compared to prior study dated 11/18/22, the prior study demonstrated an LV apical aneurysm with image enhancer. This could not be assessed on this study as image enhancer was not used.  - furosemide  - statin     #PAD, hx of toe amputation  - podiatry follows as outpatient    GI prophylaxsis  DVT Heparin  Diet renal

## 2024-09-29 NOTE — PROGRESS NOTE ADULT - SUBJECTIVE AND OBJECTIVE BOX
SUBJECTIVE/OVERNIGHT EVENTS  Today is hospital day 6d. This morning patient was seen and examined at bedside, resting comfortably in bed. No acute or major events overnight.      MEDICATIONS  STANDING MEDICATIONS  aspirin  chewable 81 milliGRAM(s) Oral daily  atorvastatin 40 milliGRAM(s) Oral at bedtime  chlorhexidine 2% Cloths 1 Application(s) Topical <User Schedule>  clopidogrel Tablet 75 milliGRAM(s) Oral daily  cyanocobalamin 1000 MICROGram(s) Oral daily  darbepoetin Injectable Syringe 50 MICROGram(s) IV Push <User Schedule>  dextrose 5%. 1000 milliLiter(s) IV Continuous <Continuous>  dextrose 5%. 1000 milliLiter(s) IV Continuous <Continuous>  dextrose 50% Injectable 25 Gram(s) IV Push once  dextrose 50% Injectable 12.5 Gram(s) IV Push once  dextrose 50% Injectable 25 Gram(s) IV Push once  folic acid 1 milliGRAM(s) Oral daily  furosemide    Tablet 40 milliGRAM(s) Oral daily  glucagon  Injectable 1 milliGRAM(s) IntraMuscular once  heparin   Injectable 5000 Unit(s) SubCutaneous every 8 hours  influenza  Vaccine (HIGH DOSE) 0.5 milliLiter(s) IntraMuscular once  insulin lispro Injectable (ADMELOG) 3 Unit(s) SubCutaneous before breakfast  insulin lispro Injectable (ADMELOG) 3 Unit(s) SubCutaneous before dinner  metoprolol succinate ER 25 milliGRAM(s) Oral daily  NIFEdipine XL 60 milliGRAM(s) Oral daily  pantoprazole    Tablet 40 milliGRAM(s) Oral before breakfast  senna 2 Tablet(s) Oral at bedtime  tamsulosin 0.4 milliGRAM(s) Oral at bedtime    PRN MEDICATIONS  acetaminophen     Tablet .. 650 milliGRAM(s) Oral every 6 hours PRN  artificial  tears Solution 1 Drop(s) Both EYES every 2 hours PRN  dextrose Oral Gel 15 Gram(s) Oral once PRN  ondansetron Injectable 4 milliGRAM(s) IV Push every 6 hours PRN    VITALS  T(F): 97.4 (09-29-24 @ 00:15), Max: 97.6 (09-28-24 @ 08:19)  HR: 80 (09-29-24 @ 05:00) (69 - 80)  BP: 123/70 (09-29-24 @ 05:00) (101/59 - 140/63)  RR: 18 (09-29-24 @ 00:15) (18 - 18)  SpO2: 95% (09-29-24 @ 00:15) (95% - 97%)  POCT Blood Glucose.: 144 mg/dL (09-29-24 @ 07:56)  POCT Blood Glucose.: 175 mg/dL (09-28-24 @ 21:38)  POCT Blood Glucose.: 129 mg/dL (09-28-24 @ 16:39)    PHYSICAL EXAM  GENERAL  (  x) NAD, lying in bed comfortably     (  ) obtunded     (  ) lethargic     (  ) somnolent    HEAD  (  ) Atraumatic     (  ) hematoma     (  ) laceration (specify location:       )  - scleral icterus     NECK  (  ) Supple     (  ) neck stiffness     (  ) nuchal rigidity     (  )  no JVD     (  ) JVD present ( -- cm)    HEART  Rate -->  ( x ) normal rate    (  ) bradycardic    (  ) tachycardic  Rhythm -->  (  x) regular    (  ) regularly irregular    (  ) irregularly irregular  Murmurs -->  (  ) normal s1/s2    (  ) systolic murmur    (  ) diastolic murmur    (  ) continuous murmur     (  ) S3 present    (  ) S4 present    LUNGS  (x  )Unlabored respirations     (  ) tachypnea  (x  ) B/L air entry     (  ) decreased breath sounds in:  (location     )    (  ) no adventitious sound     (  ) crackles     (  ) wheezing      (  ) rhonchi      (specify location:       )  (  ) chest wall tenderness (specify location:       )    ABDOMEN  (  ) Soft     (  ) tense   |   (  ) nondistended     (  ) distended   |   (  ) +BS     (  ) hypoactive bowel sounds     (  ) hyperactive bowel sounds  (  ) nontender     (  ) RUQ tenderness     (  ) RLQ tenderness     (  ) LLQ tenderness     (  ) epigastric tenderness     (  ) diffuse tenderness  -distended  (  ) Splenomegaly      (  ) Hepatomegaly      (  ) Jaundice     (  ) ecchymosis     EXTREMITIES  ( x ) Normal     (  ) Rash     (  ) ecchymosis     (  ) varicose veins      (  ) pitting edema     (  ) non-pitting edema   (  ) ulceration     (  ) gangrene:     (location:     )    NERVOUS SYSTEM  ( x ) A&Ox3     (  ) confused     (  ) lethargic  CN II-XII:     (  ) Intact     (  ) focal deficits  (Specify:     )   Upper extremities:     (  ) strength X/5     (  ) focal deficit (specify:    )  Lower extremities:     (  ) strength  X/5    (  ) focal deficit (specify:    )    SKIN  (  ) No rashes or lesions     (  ) maculopapular rash     (  ) pustules     (  ) vesicles     (  ) ulcer     (  ) ecchymosis     (specify location:     )  - yellow    (  ) Indwelling Lacey Catheter   Date insterted:    Reason (  ) Critical illness     (  ) urinary retention    (  ) Accurate Ins/Outs Monitoring     (  ) CMO patient    (  ) Central Line  Date inserted:  Location: (  ) Right IJ   (  ) Left IJ   (  ) Right Fem   (  ) Left Fem    (  ) SPC  (  ) pigtail  (  ) PEG tube  (  ) colostomy  (  ) jejunostomy  (  ) U-Dall    LABS             8.9    4.38  )-----------( 221      ( 09-29-24 @ 06:20 )             28.1     139  |  98  |  30  -------------------------<  145   09-28-24 @ 07:58  3.9  |  30  |  5.4    Ca      7.5     09-28-24 @ 07:58  Phos   2.2     09-28-24 @ 07:58  Mg     1.9     09-28-24 @ 07:58    TPro  6.1  /  Alb  3.1  /  TBili  0.4  /  DBili  x   /  AST  <5  /  ALT  <5  /  AlkPhos  143  /  GGT  x     09-28-24 @ 07:58        Urinalysis Basic - ( 28 Sep 2024 07:58 )    Color: x / Appearance: x / SG: x / pH: x  Gluc: 145 mg/dL / Ketone: x  / Bili: x / Urobili: x   Blood: x / Protein: x / Nitrite: x   Leuk Esterase: x / RBC: x / WBC x   Sq Epi: x / Non Sq Epi: x / Bacteria: x          IMAGING

## 2024-09-30 LAB
ALBUMIN SERPL ELPH-MCNC: 3 G/DL — LOW (ref 3.5–5.2)
ALP SERPL-CCNC: 147 U/L — HIGH (ref 30–115)
ALT FLD-CCNC: <5 U/L — SIGNIFICANT CHANGE UP (ref 0–41)
ANION GAP SERPL CALC-SCNC: 10 MMOL/L — SIGNIFICANT CHANGE UP (ref 7–14)
AST SERPL-CCNC: 5 U/L — SIGNIFICANT CHANGE UP (ref 0–41)
BASOPHILS # BLD AUTO: 0.05 K/UL — SIGNIFICANT CHANGE UP (ref 0–0.2)
BASOPHILS NFR BLD AUTO: 1 % — SIGNIFICANT CHANGE UP (ref 0–1)
BILIRUB SERPL-MCNC: 0.4 MG/DL — SIGNIFICANT CHANGE UP (ref 0.2–1.2)
BUN SERPL-MCNC: 31 MG/DL — HIGH (ref 10–20)
CALCIUM SERPL-MCNC: 7.6 MG/DL — LOW (ref 8.4–10.5)
CHLORIDE SERPL-SCNC: 96 MMOL/L — LOW (ref 98–110)
CO2 SERPL-SCNC: 30 MMOL/L — SIGNIFICANT CHANGE UP (ref 17–32)
CREAT SERPL-MCNC: 5.3 MG/DL — CRITICAL HIGH (ref 0.7–1.5)
CULTURE RESULTS: SIGNIFICANT CHANGE UP
EGFR: 11 ML/MIN/1.73M2 — LOW
EOSINOPHIL # BLD AUTO: 0.25 K/UL — SIGNIFICANT CHANGE UP (ref 0–0.7)
EOSINOPHIL NFR BLD AUTO: 5.1 % — SIGNIFICANT CHANGE UP (ref 0–8)
GLUCOSE BLDC GLUCOMTR-MCNC: 127 MG/DL — HIGH (ref 70–99)
GLUCOSE BLDC GLUCOMTR-MCNC: 145 MG/DL — HIGH (ref 70–99)
GLUCOSE BLDC GLUCOMTR-MCNC: 168 MG/DL — HIGH (ref 70–99)
GLUCOSE SERPL-MCNC: 124 MG/DL — HIGH (ref 70–99)
HCT VFR BLD CALC: 27.4 % — LOW (ref 42–52)
HGB BLD-MCNC: 8.9 G/DL — LOW (ref 14–18)
IMM GRANULOCYTES NFR BLD AUTO: 0.8 % — HIGH (ref 0.1–0.3)
LYMPHOCYTES # BLD AUTO: 0.72 K/UL — LOW (ref 1.2–3.4)
LYMPHOCYTES # BLD AUTO: 14.8 % — LOW (ref 20.5–51.1)
MAGNESIUM SERPL-MCNC: 1.7 MG/DL — LOW (ref 1.8–2.4)
MCHC RBC-ENTMCNC: 27.1 PG — SIGNIFICANT CHANGE UP (ref 27–31)
MCHC RBC-ENTMCNC: 32.5 G/DL — SIGNIFICANT CHANGE UP (ref 32–37)
MCV RBC AUTO: 83.3 FL — SIGNIFICANT CHANGE UP (ref 80–94)
MONOCYTES # BLD AUTO: 0.52 K/UL — SIGNIFICANT CHANGE UP (ref 0.1–0.6)
MONOCYTES NFR BLD AUTO: 10.7 % — HIGH (ref 1.7–9.3)
NEUTROPHILS # BLD AUTO: 3.3 K/UL — SIGNIFICANT CHANGE UP (ref 1.4–6.5)
NEUTROPHILS NFR BLD AUTO: 67.6 % — SIGNIFICANT CHANGE UP (ref 42.2–75.2)
NRBC # BLD: 0 /100 WBCS — SIGNIFICANT CHANGE UP (ref 0–0)
PHOSPHATE SERPL-MCNC: 2.4 MG/DL — SIGNIFICANT CHANGE UP (ref 2.1–4.9)
PLATELET # BLD AUTO: 219 K/UL — SIGNIFICANT CHANGE UP (ref 130–400)
PMV BLD: 10.1 FL — SIGNIFICANT CHANGE UP (ref 7.4–10.4)
POTASSIUM SERPL-MCNC: 4.2 MMOL/L — SIGNIFICANT CHANGE UP (ref 3.5–5)
POTASSIUM SERPL-SCNC: 4.2 MMOL/L — SIGNIFICANT CHANGE UP (ref 3.5–5)
PROT SERPL-MCNC: 6.2 G/DL — SIGNIFICANT CHANGE UP (ref 6–8)
RBC # BLD: 3.29 M/UL — LOW (ref 4.7–6.1)
RBC # FLD: 17.4 % — HIGH (ref 11.5–14.5)
SODIUM SERPL-SCNC: 136 MMOL/L — SIGNIFICANT CHANGE UP (ref 135–146)
SPECIMEN SOURCE: SIGNIFICANT CHANGE UP
WBC # BLD: 4.88 K/UL — SIGNIFICANT CHANGE UP (ref 4.8–10.8)
WBC # FLD AUTO: 4.88 K/UL — SIGNIFICANT CHANGE UP (ref 4.8–10.8)

## 2024-09-30 PROCEDURE — 78264 GASTRIC EMPTYING IMG STUDY: CPT | Mod: 26

## 2024-09-30 PROCEDURE — 71045 X-RAY EXAM CHEST 1 VIEW: CPT | Mod: 26

## 2024-09-30 RX ORDER — MAGNESIUM SULFATE 500 MG/ML
2 VIAL (ML) INJECTION ONCE
Refills: 0 | Status: COMPLETED | OUTPATIENT
Start: 2024-09-30 | End: 2024-09-30

## 2024-09-30 RX ADMIN — Medication 4 MILLIGRAM(S): at 09:06

## 2024-09-30 RX ADMIN — Medication 3 UNIT(S): at 08:02

## 2024-09-30 RX ADMIN — Medication 5000 UNIT(S): at 05:42

## 2024-09-30 RX ADMIN — Medication 0.4 MILLIGRAM(S): at 22:09

## 2024-09-30 RX ADMIN — PANTOPRAZOLE SODIUM 40 MILLIGRAM(S): 40 TABLET, DELAYED RELEASE ORAL at 05:42

## 2024-09-30 RX ADMIN — ATORVASTATIN CALCIUM 40 MILLIGRAM(S): 10 TABLET, FILM COATED ORAL at 22:09

## 2024-09-30 RX ADMIN — Medication 25 GRAM(S): at 09:06

## 2024-09-30 RX ADMIN — Medication 25 MILLIGRAM(S): at 05:41

## 2024-09-30 RX ADMIN — Medication 3 UNIT(S): at 17:37

## 2024-09-30 RX ADMIN — FUROSEMIDE 40 MILLIGRAM(S): 10 INJECTION INTRAVENOUS at 05:42

## 2024-09-30 RX ADMIN — Medication 5000 UNIT(S): at 17:38

## 2024-09-30 RX ADMIN — Medication 60 MILLIGRAM(S): at 05:42

## 2024-09-30 RX ADMIN — CHLORHEXIDINE GLUCONATE ORAL RINSE 1 APPLICATION(S): 1.2 SOLUTION DENTAL at 05:46

## 2024-09-30 RX ADMIN — Medication 5000 UNIT(S): at 22:10

## 2024-09-30 NOTE — PROGRESS NOTE ADULT - SUBJECTIVE AND OBJECTIVE BOX
Chart reviewed, patient examined. Pertinent results reviewed.  Case discussed with HO; specialist f/u reviewed  HD#8  SUBJECTIVE:  HPI:  Pt is a 64-year-old man with a past medical history of ESRD on hemodialysis MWF (last dialysis on 9/20/24), NIDDM, BPH,DM, CAD s/p  CABG, HTN, CHF, TIA, PAD, left toe ulcer s/p amputation sent in by PCP (Dr. Olivier) for increasing abdomen distention over the last 3 weeks. Patient reported that he developed new onset abdominal distention over the past 3 weeks that is now causing him abdominal pain and difficulty having PO intake with N&V. He reported that he had to vomit small amounts of food if he eats. He also reported being constipated over the past 2 days. He denied any hematemesis, sob, chest pain, orthopnea, blood in stools, swelling of the legs, fever or chills, He is aneuric and does not produce urine. He denied any history of smoking or excessive alcohol use. Patient presented to the ED on 9/16 and underwent a CT, he was discharged on the same day without further work-up. Patient is currently in STR    ED VS reviewed    CT abdomen/pelvis from 9/16/24:   1. Since March 28, 2024, no significant change in large volume abdominopelvic ascites..2. Moderate to large right inguinal hernia contains ascites fluid and a   short segment of nonobstructed cecum and distal ileum.3. Unchanged small left and trace right pleural effusions with small   partially imaged bibasilar opacities.4. A 3.6 cm hyperattenuating focus within the lateral aspect of the right gluteus muscle is suspicious for small hematoma; correlation with clinical history and exam is suggested.           PAST MEDICAL & SURGICAL HISTORY  S/P CABG (coronary artery bypass graft)  x4    Diabetes mellitus    Transient ischemic attack (TIA)  2017; 2008    Chronic kidney disease (CKD)  Stage IV- on HD about 6 years; makes min urine    Hypertension    Stented coronary artery  in 2008    Myocardial infarction  2012    PAD (peripheral artery disease)  S/p bypass left leg    HLD (hyperlipidemia)    BPH (benign prostatic hyperplasia)    Pain in left knee  s/p fall    OA (osteoarthritis)    [place] (hard of hearing)    Chronic anemia    History of medical problems  left arm precaution    S/P CABG (coronary artery bypass graft)  2012    H/O arterial bypass of lower limb  Left Lower Extremity (2016)    History of surgery  Left CEA (2017)  Left Pinkie toe Amputation (2014)  CABG x 4 (2012)  Card cath - stent (2008)      AV fistula  2019  LEFT AV FISTULA  SH: living @ SNF > 1 year;   ROS: as in HPI; F/U w POD; Min walking at SNF; no Fever or chills or bleeding  Interval: Seen by GI and hepatology. Still w repeated N&V; planned for and had Paracentesis in IR today- 4000Ml.    ALLERGIES:  No Known Allergies    MEDICATIONS:  MEDICATIONS  (STANDING):  aspirin  chewable 81 milliGRAM(s) Oral daily  atorvastatin 40 milliGRAM(s) Oral at bedtime  chlorhexidine 2% Cloths 1 Application(s) Topical <User Schedule>  clopidogrel Tablet 75 milliGRAM(s) Oral daily  cyanocobalamin 1000 MICROGram(s) Oral daily  darbepoetin Injectable Syringe 50 MICROGram(s) IV Push <User Schedule>  dextrose 5%. 1000 milliLiter(s) (100 mL/Hr) IV Continuous <Continuous>  dextrose 5%. 1000 milliLiter(s) (50 mL/Hr) IV Continuous <Continuous>  dextrose 50% Injectable 25 Gram(s) IV Push once  dextrose 50% Injectable 12.5 Gram(s) IV Push once  dextrose 50% Injectable 25 Gram(s) IV Push once  folic acid 1 milliGRAM(s) Oral daily  furosemide    Tablet 40 milliGRAM(s) Oral daily  glucagon  Injectable 1 milliGRAM(s) IntraMuscular once  heparin   Injectable 5000 Unit(s) SubCutaneous every 8 hours  influenza  Vaccine (HIGH DOSE) 0.5 milliLiter(s) IntraMuscular once  insulin lispro Injectable (ADMELOG) 3 Unit(s) SubCutaneous before dinner  insulin lispro Injectable (ADMELOG) 3 Unit(s) SubCutaneous before breakfast  magnesium sulfate  IVPB 2 Gram(s) IV Intermittent once  metoprolol succinate ER 25 milliGRAM(s) Oral daily  NIFEdipine XL 60 milliGRAM(s) Oral daily  pantoprazole    Tablet 40 milliGRAM(s) Oral before breakfast  senna 2 Tablet(s) Oral at bedtime  tamsulosin 0.4 milliGRAM(s) Oral at bedtime    MEDICATIONS  (PRN):  acetaminophen     Tablet .. 650 milliGRAM(s) Oral every 6 hours PRN Mild Pain (1 - 3)  artificial  tears Solution 1 Drop(s) Both EYES every 2 hours PRN Dry Eyes  dextrose Oral Gel 15 Gram(s) Oral once PRN Blood Glucose LESS THAN 70 milliGRAM(s)/deciliter  ondansetron Injectable 4 milliGRAM(s) IV Push every 6 hours PRN Nausea and/or Vomiting    VITALS:   Vital Signs Last 24 Hrs  T(C): 36.2 (30 Sep 2024 07:53), Max: 37.1 (29 Sep 2024 08:35)  T(F): 97.1 (30 Sep 2024 07:53), Max: 98.7 (29 Sep 2024 08:35)  HR: 78 (30 Sep 2024 07:53) (67 - 78)  BP: 153/71 (30 Sep 2024 07:53) (116/54 - 153/71)  BP(mean): --  RR: 18 (30 Sep 2024 07:53) (17 - 18)  SpO2: 97% (30 Sep 2024 07:53) (97% - 99%)    Parameters below as of 30 Sep 2024 07:53  Patient On (Oxygen Delivery Method): room air          LABS:                             8.8    4.51  )-----------( 173      ( 25 Sep 2024 06:49 )             28.0     09-25    135  |  95[L]  |  24[H]  ----------------------------<  81  4.0   |  25  |  4.6[HH]    Ca    7.9[L]      25 Sep 2024 06:49  Phos  2.8     09-25  Mg     1.9     09-25    TPro  6.7  /  Alb  2.9[L]  /  TBili  0.4  /  DBili  x   /  AST  6   /  ALT  <5  /  AlkPhos  153[H]  09-25      Urinalysis Basic - ( 25 Sep 2024 06:49 )    Color: x / Appearance: x / SG: x / pH: x  Gluc: 81 mg/dL / Ketone: x  / Bili: x / Urobili: x   Blood: x / Protein: x / Nitrite: x   Leuk Esterase: x / RBC: x / WBC x   Sq Epi: x / Non Sq Epi: x / Bacteria: x                    PHYSICAL EXAM:  GEN: No acute distress; NETTA; pale; supine in bed; AAOx4  H: AT/NC; ; TH: MMM; fair dentition  LUNGS: Clear to auscultation  HT: sternotomy scar; RRR, no MRG   ABD:  +3 distended- NT; relatively soft- no HSM or Mass; + UMB hernia  EXT: No pedal edema- legs wrapped w UNNA boots.  NEURO: AAOX4; Gen weak; legs mi-mod weak and atrophic           Chart reviewed, patient examined. Pertinent results reviewed.  Case discussed with HO; specialist f/u reviewed  HD#8  SUBJECTIVE:  HPI:  Pt is a 64-year-old man with a past medical history of ESRD on hemodialysis MWF (last dialysis on 9/20/24), NIDDM, BPH,DM, CAD s/p  CABG, HTN, CHF, TIA, PAD, left toe ulcer s/p amputation sent in by PCP (Dr. Olivier) for increasing abdomen distention over the last 3 weeks. Patient reported that he developed new onset abdominal distention over the past 3 weeks that is now causing him abdominal pain and difficulty having PO intake with N&V. He reported that he had to vomit small amounts of food if he eats. He also reported being constipated over the past 2 days. He denied any hematemesis, sob, chest pain, orthopnea, blood in stools, swelling of the legs, fever or chills, He is aneuric and does not produce urine. He denied any history of smoking or excessive alcohol use. Patient presented to the ED on 9/16 and underwent a CT, he was discharged on the same day without further work-up. Patient is currently in STR    ED VS reviewed    CT abdomen/pelvis from 9/16/24:   1. Since March 28, 2024, no significant change in large volume abdominopelvic ascites..2. Moderate to large right inguinal hernia contains ascites fluid and a   short segment of nonobstructed cecum and distal ileum.3. Unchanged small left and trace right pleural effusions with small   partially imaged bibasilar opacities.4. A 3.6 cm hyperattenuating focus within the lateral aspect of the right gluteus muscle is suspicious for small hematoma; correlation with clinical history and exam is suggested.    S/P PARACENTESIS x2-9/25, 9/27 (4+6000)       PAST MEDICAL & SURGICAL HISTORY  S/P CABG (coronary artery bypass graft)  x4    Diabetes mellitus    Transient ischemic attack (TIA)  2017; 2008    Chronic kidney disease (CKD)  Stage IV- on HD about 6 years; makes min urine    Hypertension    Stented coronary artery  in 2008    Myocardial infarction  2012    PAD (peripheral artery disease)  S/p bypass left leg    HLD (hyperlipidemia)    BPH (benign prostatic hyperplasia)    Pain in left knee  s/p fall    OA (osteoarthritis)    [place] (hard of hearing)    Chronic anemia    History of medical problems  left arm precaution    S/P CABG (coronary artery bypass graft)  2012    H/O arterial bypass of lower limb  Left Lower Extremity (2016)    History of surgery  Left CEA (2017)  Left Pinkie toe Amputation (2014)  CABG x 4 (2012)  Card cath - stent (2008)      AV fistula  2019  LEFT AV FISTULA  SH: living @ SNF > 1 year;   ROS: as in HPI; F/U w POD; Min walking at SNF; no Fever or chills or bleeding  Interval: Seen by GI and hepatology. Still w repeated N&V; planned for and had Paracentesis in IR today- 4000Ml.    ALLERGIES:  No Known Allergies    MEDICATIONS:  MEDICATIONS  (STANDING):  aspirin  chewable 81 milliGRAM(s) Oral daily  atorvastatin 40 milliGRAM(s) Oral at bedtime  chlorhexidine 2% Cloths 1 Application(s) Topical <User Schedule>  clopidogrel Tablet 75 milliGRAM(s) Oral daily  cyanocobalamin 1000 MICROGram(s) Oral daily  darbepoetin Injectable Syringe 50 MICROGram(s) IV Push <User Schedule>  dextrose 5%. 1000 milliLiter(s) (100 mL/Hr) IV Continuous <Continuous>  dextrose 5%. 1000 milliLiter(s) (50 mL/Hr) IV Continuous <Continuous>  dextrose 50% Injectable 25 Gram(s) IV Push once  dextrose 50% Injectable 12.5 Gram(s) IV Push once  dextrose 50% Injectable 25 Gram(s) IV Push once  folic acid 1 milliGRAM(s) Oral daily  furosemide    Tablet 40 milliGRAM(s) Oral daily  glucagon  Injectable 1 milliGRAM(s) IntraMuscular once  heparin   Injectable 5000 Unit(s) SubCutaneous every 8 hours  influenza  Vaccine (HIGH DOSE) 0.5 milliLiter(s) IntraMuscular once  insulin lispro Injectable (ADMELOG) 3 Unit(s) SubCutaneous before dinner  insulin lispro Injectable (ADMELOG) 3 Unit(s) SubCutaneous before breakfast  magnesium sulfate  IVPB 2 Gram(s) IV Intermittent once  metoprolol succinate ER 25 milliGRAM(s) Oral daily  NIFEdipine XL 60 milliGRAM(s) Oral daily  pantoprazole    Tablet 40 milliGRAM(s) Oral before breakfast  senna 2 Tablet(s) Oral at bedtime  tamsulosin 0.4 milliGRAM(s) Oral at bedtime    MEDICATIONS  (PRN):  acetaminophen     Tablet .. 650 milliGRAM(s) Oral every 6 hours PRN Mild Pain (1 - 3)  artificial  tears Solution 1 Drop(s) Both EYES every 2 hours PRN Dry Eyes  dextrose Oral Gel 15 Gram(s) Oral once PRN Blood Glucose LESS THAN 70 milliGRAM(s)/deciliter  ondansetron Injectable 4 milliGRAM(s) IV Push every 6 hours PRN Nausea and/or Vomiting    VITALS:   Vital Signs Last 24 Hrs  T(C): 36.2 (30 Sep 2024 07:53), Max: 37.1 (29 Sep 2024 08:35)  T(F): 97.1 (30 Sep 2024 07:53), Max: 98.7 (29 Sep 2024 08:35)  HR: 78 (30 Sep 2024 07:53) (67 - 78)  BP: 153/71 (30 Sep 2024 07:53) (116/54 - 153/71)  BP(mean): --  RR: 18 (30 Sep 2024 07:53) (17 - 18)  SpO2: 97% (30 Sep 2024 07:53) (97% - 99%)    Parameters below as of 30 Sep 2024 07:53  Patient On (Oxygen Delivery Method): room air          LABS:                             8.8    4.51  )-----------( 173      ( 25 Sep 2024 06:49 )             28.0     09-25    135  |  95[L]  |  24[H]  ----------------------------<  81  4.0   |  25  |  4.6[HH]    Ca    7.9[L]      25 Sep 2024 06:49  Phos  2.8     09-25  Mg     1.9     09-25    TPro  6.7  /  Alb  2.9[L]  /  TBili  0.4  /  DBili  x   /  AST  6   /  ALT  <5  /  AlkPhos  153[H]  09-25      Urinalysis Basic - ( 25 Sep 2024 06:49 )    Color: x / Appearance: x / SG: x / pH: x  Gluc: 81 mg/dL / Ketone: x  / Bili: x / Urobili: x   Blood: x / Protein: x / Nitrite: x   Leuk Esterase: x / RBC: x / WBC x   Sq Epi: x / Non Sq Epi: x / Bacteria: x                    PHYSICAL EXAM:  GEN: No acute distress; NETTA; pale; supine in bed; AAOx4  H: AT/NC; ; TH: MMM; fair dentition  LUNGS: Clear to auscultation  HT: sternotomy scar; RRR, no MRG   ABD:  +3 distended- NT; relatively soft- no HSM or Mass; + UMB hernia  EXT: No pedal edema- legs wrapped w UNNA boots.  NEURO: AAOX4; Gen weak; legs mi-mod weak and atrophic

## 2024-09-30 NOTE — PROGRESS NOTE ADULT - ASSESSMENT
#ESRD on HD MWF  #HFmrEF-Last TTE in 2022  - Patient cannot have po intake without vomiting sips of food. Lying comfortably in bed. Vitals WNL. PE showed distended abdomen with clear lungs  -In the ED:   T: 36.5      HR: 68    BP: 122/61    Spo2: 95% on room air  -Alk Phos: 167  -CT abdomen/pelvis from 9/16/24:   1. Since March 28, 2024, no significant change in large volume abdominopelvic ascites..2. Moderate to large right inguinal hernia contains ascites fluid and a   short segment of nonobstructed cecum and distal ileum.3. Unchanged small left and trace right pleural effusions with small   partially imaged bibasilar opacities.4. A 3.6 cm hyperattenuating focus within the lateral aspect of the right gluteus muscle is suspicious for small hematoma; correlation with clinical history and exam is suggested.  - TTE from 2022:  Left ventricular ejection fraction, by visual estimation, is 45 to 50%. Grade III diastolic dysfunction with elevated left atrial and left ventricular end-diastolic pressures. Moderate mitral regurgitation; Repeat noted  - Patient on home PO lasix 40mg, aldactone 25 mg  -  Procedure team consult for paracentesis is in.  -  f/u ascites lab work-up. Extensive work-up ordered  - f/u Pro-Bnp   - f/u TTE  - f/u am Phosphorus and PTH  - f/u with nephrology for HD. Last HD was on Friday  - f/u with GI - see notes: tap and test fluid; INCREASE DIALYSIS????- RENAL TO ADVISE ALSO- OP HD vs HOME DIALYSIS        SNF TECHNIQUE  - Place patient on renal diet; CLEAR LIQUIDS 1ST         see abd XRAy- ? CONTRAST- FROM??; CT 9/16 SAYS NO PO CONTRAST  - TRY GASTROGRAFFIN STUDY- W ABD XRAYS- & REVW gi/heop  - received paracentesis today 4 liters    #CAD s/p CABG;BNP HIGH, BUT WAS HIGHER LAST ADMIT  #HTN  #History of TIA  - On home Plavix 75 mg daily, Aspirin 81 mg  - On home metoprolol succinate 25 mg , Nifedipine 60 mg daily    #DM  #left toe ulcer s/p amputation   - On Humalog 5 units before breakfast and dinner  - Will decrease it to 3U given patient is not eating that much for now    #Chronic constipation  - On Miralax and senna 2 tabs at night    #BPH  - On flomax 0.4 mg     #Full code  #DVT prophylaxis: Heparin sub q    #ESRD on HD MWF  #HFmrEF-Last TTE in 2022  - Patient cannot have po intake without vomiting sips of food. Lying comfortably in bed. Vitals WNL. PE showed distended abdomen with clear lungs  -In the ED:   T: 36.5      HR: 68    BP: 122/61    Spo2: 95% on room air  -Alk Phos: 167  -CT abdomen/pelvis from 9/16/24:   1. Since March 28, 2024, no significant change in large volume abdominopelvic ascites..2. Moderate to large right inguinal hernia contains ascites fluid and a   short segment of nonobstructed cecum and distal ileum.3. Unchanged small left and trace right pleural effusions with small   partially imaged bibasilar opacities.4. A 3.6 cm hyperattenuating focus within the lateral aspect of the right gluteus muscle is suspicious for small hematoma; correlation with clinical history and exam is suggested.  - TTE from 2022:  Left ventricular ejection fraction, by visual estimation, is 45 to 50%. Grade III diastolic dysfunction with elevated left atrial and left ventricular end-diastolic pressures. Moderate mitral regurgitation; Repeat noted  - Patient on home PO lasix 40mg, aldactone 25 mg  -  Procedure team consult for paracentesis is in.  -  f/u ascites lab work-up. Extensive work-up ordered  - f/u Pro-Bnp   - f/u TTE  - f/u am Phosphorus and PTH  - f/u with nephrology for HD. Last HD was on Friday  - needs aggressive HD for fluid removal  - ranal states- home HD @ SNF does excellent ultrafiltration- can continue w fluid removal after DC  - f/u with GI - see notes: tap and test fluid;         - Place patient on renal diet; CLEAR LIQUIDS 1ST         see abd XRAy- ? CONTRAST- FROM??; CT 9/16 SAYS NO PO CONTRAST  - TRY GASTROGRAFFIN STUDY- W ABD XRAYS- & REVW gi/HEP  - received paracentesis X2  - will add calorie count as well    #CAD s/p CABG;BNP HIGH, BUT WAS HIGHER LAST ADMIT  - probable cardio-renal w fluid overload.  #HTN  #History of TIA  - On home Plavix 75 mg daily, Aspirin 81 mg  - On home metoprolol succinate 25 mg , Nifedipine 60 mg daily    #DM  #left toe ulcer s/p amputation   - On Humalog 5 units before breakfast and dinner  - Will decrease it to 3U given patient is not eating that much for now    #Chronic constipation  - On Miralax and senna 2 tabs at night    #BPH  - On flomax 0.4 mg     #Full code  #DVT prophylaxis: Heparin sub q

## 2024-09-30 NOTE — PROGRESS NOTE ADULT - ASSESSMENT
64-year-old man with a past medical history of ESRD on hemodialysis MWF (last dialysis on 9/20/24), NIDDM, BPH,DM, CAD s/p  CABG, HTN, CHF, TIA, PAD, left toe ulcer s/p amputation sent in by PCP (Dr. Olivier) for increasing abdomen distention over the last 3 weeks.  HD in am  3h opti 160 UF 2l   phos noted, no binders  BP controlled , d/c lasix    s/p paracentesis with 4 liters fluid removal 9/25  h/h noted resume on [facility]  / check iron stores   will follow

## 2024-09-30 NOTE — PROGRESS NOTE ADULT - ASSESSMENT
#ESRD on HD MWF  #HFmrEF-Last TTE in 2022  - Patient cannot have po intake without vomiting sips of food. Lying comfortably in bed. Vitals WNL. PE showed distended abdomen with clear lungs  -In the ED:   T: 36.5      HR: 68    BP: 122/61    Spo2: 95% on room air  -Alk Phos: 167  -CT abdomen/pelvis from 9/16/24:   1. Since March 28, 2024, no significant change in large volume abdominopelvic ascites..2. Moderate to large right inguinal hernia contains ascites fluid and a   short segment of nonobstructed cecum and distal ileum.3. Unchanged small left and trace right pleural effusions with small   partially imaged bibasilar opacities.4. A 3.6 cm hyperattenuating focus within the lateral aspect of the right gluteus muscle is suspicious for small hematoma; correlation with clinical history and exam is suggested.  - TTE from 2022:  Left ventricular ejection fraction, by visual estimation, is 45 to 50%. Grade III diastolic dysfunction with elevated left atrial and left ventricular end-diastolic pressures. Moderate mitral regurgitation  - Patient on home PO lasix 40mg, aldactone 25 mg  -  Procedure team consult for paracentesis is in.  -  f/u ascites lab work-up. Extensive work-up ordered  - f/u Pro-Bnp   - f/u TTE  - f/u am Phosphorus and PTH  - f/u with nephrology for HD. Last HD was on saturday- agrees he needs aggressive fluid removal for cardio-renal fluid overload  - f/u with GI - see notes: tap and test fluid; INCREASE DIALYSIS????- RENAL TO ADVISE ALSO- OP HD vs HOME DIALYSIS  - Place patient on renal diet; CLEAR LIQUIDS 1ST         see abd XRAy- ? CONTRAST- FROM??; CT 9/16 SAYS NO PO CONTRAST  - TRY GASTROGRAFFIN STUDY- W ABD XRAYS- & REVW gi/hep  - received paracentesis today 4 liters    #CAD s/p CABG;BNP HIGH, BUT WAS HIGHER LAST ADMIT  #HTN  #History of TIA  - On home Plavix 75 mg daily, Aspirin 81 mg  - On home metoprolol succinate 25 mg , Nifedipine 60 mg daily    #DM  #left toe ulcer s/p amputation   - On Humalog 5 units before breakfast and dinner  - Will decrease it to 3U given patient is not eating that much for now    #Chronic constipation  - On Miralax and senna 2 tabs at night    #BPH  - On flomax 0.4 mg     #Full code  #DVT prophylaxis: Heparin sub q

## 2024-09-30 NOTE — PROGRESS NOTE ADULT - TIME BILLING
spending 36 minutes on this patient's case:  12    minutes w patient, 12   minutes reviewing the chart,    and  12   minutes speaking to the HO, RN and family, also coordinating care and documenting all.

## 2024-09-30 NOTE — PROGRESS NOTE ADULT - SUBJECTIVE AND OBJECTIVE BOX
Chart reviewed, patient examined. Pertinent results reviewed.  Case discussed with HO; specialist f/u reviewed  HD#8  SUBJECTIVE:  HPI:  Pt is a 64-year-old man with a past medical history of ESRD on hemodialysis MWF (last dialysis on 9/20/24), NIDDM, BPH,DM, CAD s/p  CABG, HTN, CHF, TIA, PAD, left toe ulcer s/p amputation sent in by PCP (Dr. Olivier) for increasing abdomen distention over the last 3 weeks. Patient reported that he developed new onset abdominal distention over the past 3 weeks that is now causing him abdominal pain and difficulty having PO intake with N&V. He reported that he had to vomit small amounts of food if he eats. He also reported being constipated over the past 2 days. He denied any hematemesis, sob, chest pain, orthopnea, blood in stools, swelling of the legs, fever or chills, He is aneuric and does not produce urine. He denied any history of smoking or excessive alcohol use. Patient presented to the ED on 9/16 and underwent a CT, he was discharged on the same day without further work-up. Patient is currently in [facility]    ED VS reviewed    CT abdomen/pelvis from 9/16/24:   1. Since March 28, 2024, no significant change in large volume abdominopelvic ascites..2. Moderate to large right inguinal hernia contains ascites fluid and a   short segment of nonobstructed cecum and distal ileum.3. Unchanged small left and trace right pleural effusions with small   partially imaged bibasilar opacities.4. A 3.6 cm hyperattenuating focus within the lateral aspect of the right gluteus muscle is suspicious for small hematoma; correlation with clinical history and exam is suggested.    interval: patient continues to c/o anorexia, N&V- eating very little.  Has had paracentesis x 2 and aggressive HD       PAST MEDICAL & SURGICAL HISTORY  S/P CABG (coronary artery bypass graft)  x4    Diabetes mellitus    Transient ischemic attack (TIA)  2017; 2008    Chronic kidney disease (CKD)  Stage IV- on HD about 6 years; makes min urine    Hypertension    Stented coronary artery  in 2008    Myocardial infarction  2012    PAD (peripheral artery disease)  S/p bypass left leg    HLD (hyperlipidemia)    BPH (benign prostatic hyperplasia)    Pain in left knee  s/p fall    OA (osteoarthritis)    [place] (hard of hearing[place]    Chronic anemia    History of medical problems  left arm precaution    S/P CABG (coronary artery bypass graft)  2012    H/O arterial bypass of lower limb  Left Lower Extremity (2016)    History of surgery  Left CEA (2017)  Left Pinkie toe Amputation (2014)  CABG x 4 (2012)  Card cath - stent (2008)      AV fistula  2019  LEFT AV FISTULA  SH: living @ SNF > 1 year;   ROS: as in HPI; F/U w POD; Min walking at SNF; no Fever or chills or bleeding  Interval: Seen by GI and hepatology. Still w repeated N&V; planned for and had Paracentesis in IR today- 4000Ml.    ALLERGIES:  No Known Allergies    MEDICATIONS:  MEDICATIONS  (STANDING):  aspirin  chewable 81 milliGRAM(s) Oral daily  atorvastatin 40 milliGRAM(s) Oral at bedtime  chlorhexidine 2% Cloths 1 Application(s) Topical <User Schedule>  clopidogrel Tablet 75 milliGRAM(s) Oral daily  cyanocobalamin 1000 MICROGram(s) Oral daily  darbepoetin Injectable Syringe 50 MICROGram(s) IV Push <User Schedule>  dextrose 5%. 1000 milliLiter(s) (50 mL/Hr) IV Continuous <Continuous>  dextrose 5%. 1000 milliLiter(s) (100 mL/Hr) IV Continuous <Continuous>  dextrose 50% Injectable 25 Gram(s) IV Push once  dextrose 50% Injectable 12.5 Gram(s) IV Push once  dextrose 50% Injectable 25 Gram(s) IV Push once  folic acid 1 milliGRAM(s) Oral daily  glucagon  Injectable 1 milliGRAM(s) IntraMuscular once  heparin   Injectable 5000 Unit(s) SubCutaneous every 8 hours  influenza  Vaccine (HIGH DOSE) 0.5 milliLiter(s) IntraMuscular once  insulin lispro Injectable (ADMELOG) 3 Unit(s) SubCutaneous before dinner  insulin lispro Injectable (ADMELOG) 3 Unit(s) SubCutaneous before breakfast  metoprolol succinate ER 25 milliGRAM(s) Oral daily  NIFEdipine XL 60 milliGRAM(s) Oral daily  pantoprazole    Tablet 40 milliGRAM(s) Oral before breakfast  senna 2 Tablet(s) Oral at bedtime  tamsulosin 0.4 milliGRAM(s) Oral at bedtime    MEDICATIONS  (PRN):  acetaminophen     Tablet .. 650 milliGRAM(s) Oral every 6 hours PRN Mild Pain (1 - 3)  artificial  tears Solution 1 Drop(s) Both EYES every 2 hours PRN Dry Eyes  dextrose Oral Gel 15 Gram(s) Oral once PRN Blood Glucose LESS THAN 70 milliGRAM(s)/deciliter  ondansetron Injectable 4 milliGRAM(s) IV Push every 6 hours PRN Nausea and/or Vomiting    VITALS:   Vital Signs Last 24 Hrs  T(C): 36.2 (30 Sep 2024 07:53), Max: 36.4 (29 Sep 2024 15:30)  T(F): 97.1 (30 Sep 2024 07:53), Max: 97.6 (29 Sep 2024 15:30)  HR: 78 (30 Sep 2024 07:53) (67 - 78)  BP: 153/71 (30 Sep 2024 07:53) (116/54 - 153/71)  BP(mean): --  RR: 18 (30 Sep 2024 07:53) (18 - 18)  SpO2: 97% (30 Sep 2024 07:53) (97% - 99%)    Parameters below as of 30 Sep 2024 07:53  Patient On (Oxygen Delivery Method): room air          LABS:                               8.9    4.88  )-----------( 219      ( 30 Sep 2024 06:28 )             27.4                       8.8    4.51  )-----------( 173      ( 25 Sep 2024 06:49 )             28.0     09-30    136  |  96[L]  |  31[H]  ----------------------------<  124[H]  4.2   |  30  |  5.3[HH]    Ca    7.6[L]      30 Sep 2024 06:28  Phos  2.4     09-30  Mg     1.7     09-30    TPro  6.2  /  Alb  3.0[L]  /  TBili  0.4  /  DBili  x   /  AST  5   /  ALT  <5  /  AlkPhos  147[H]  09-30    09-25    135  |  95[L]  |  24[H]  ----------------------------<  81  4.0   |  25  |  4.6[HH]    Ca    7.9[L]      25 Sep 2024 06:49  Phos  2.8     09-25  Mg     1.9     09-25    TPro  6.7  /  Alb  2.9[L]  /  TBili  0.4  /  DBili  x   /  AST  6   /  ALT  <5  /  AlkPhos  153[H]  09-25      Urinalysis Basic - ( 25 Sep 2024 06:49 )    Color: x / Appearance: x / SG: x / pH: x  Gluc: 81 mg/dL / Ketone: x  / Bili: x / Urobili: x   Blood: x / Protein: x / Nitrite: x   Leuk Esterase: x / RBC: x / WBC x   Sq Epi: x / Non Sq Epi: x / Bacteria: x                    PHYSICAL EXAM:  GEN: No acute distress; NETTA; pale; supine in bed; AAOx4; weak and tired  H: AT/NC; ; TH: MMM; fair dentition  LUNGS: Clear to auscultation  HT: sternotomy scar; RRR, no MRG   ABD:  +2 distended- NT;  soft- no HSM or Mass; + UMB hernia  EXT: No pedal edema- legs wrapped w UNNA boots.  NEURO: AAOX4; Gen weak; legs mi-mod weak and atrophic

## 2024-09-30 NOTE — PROGRESS NOTE ADULT - TIME BILLING
spending 4  minutes on this patient's case:  14  minutes w patient,  15  minutes reviewing the chart,    and  16  minutes speaking to the HO& RN , also coordinating care and documenting all.  Patient and case are new to me. spending 39 minutes on this patient's case:  12  minutes w patient,  14  minutes reviewing the chart,    and  13  minutes speaking to the HO& RN , also coordinating care and documenting all.

## 2024-09-30 NOTE — PRE-ANESTHESIA EVALUATION ADULT - NSPROPOSEDPROCEDFT_GEN_ALL_CORE
The original prescription was discontinued on 9/27/2024 by Laverne Tristan MD.   
removal of left foot foreign body

## 2024-09-30 NOTE — PROGRESS NOTE ADULT - SUBJECTIVE AND OBJECTIVE BOX
seen and examined  24 h events noted   no distress         PAST HISTORY  --------------------------------------------------------------------------------  No significant changes to PMH, PSH, FHx, SHx, unless otherwise noted    ALLERGIES & MEDICATIONS  --------------------------------------------------------------------------------  Allergies    No Known Allergies    Intolerances      Standing Inpatient Medications  aspirin  chewable 81 milliGRAM(s) Oral daily  atorvastatin 40 milliGRAM(s) Oral at bedtime  chlorhexidine 2% Cloths 1 Application(s) Topical <User Schedule>  clopidogrel Tablet 75 milliGRAM(s) Oral daily  cyanocobalamin 1000 MICROGram(s) Oral daily  darbepoetin Injectable Syringe 50 MICROGram(s) IV Push <User Schedule>  dextrose 5%. 1000 milliLiter(s) IV Continuous <Continuous>  dextrose 5%. 1000 milliLiter(s) IV Continuous <Continuous>  dextrose 50% Injectable 25 Gram(s) IV Push once  dextrose 50% Injectable 12.5 Gram(s) IV Push once  dextrose 50% Injectable 25 Gram(s) IV Push once  folic acid 1 milliGRAM(s) Oral daily  furosemide    Tablet 40 milliGRAM(s) Oral daily  glucagon  Injectable 1 milliGRAM(s) IntraMuscular once  heparin   Injectable 5000 Unit(s) SubCutaneous every 8 hours  influenza  Vaccine (HIGH DOSE) 0.5 milliLiter(s) IntraMuscular once  insulin lispro Injectable (ADMELOG) 3 Unit(s) SubCutaneous before breakfast  insulin lispro Injectable (ADMELOG) 3 Unit(s) SubCutaneous before dinner  metoprolol succinate ER 25 milliGRAM(s) Oral daily  NIFEdipine XL 60 milliGRAM(s) Oral daily  pantoprazole    Tablet 40 milliGRAM(s) Oral before breakfast  senna 2 Tablet(s) Oral at bedtime  tamsulosin 0.4 milliGRAM(s) Oral at bedtime    PRN Inpatient Medications  acetaminophen     Tablet .. 650 milliGRAM(s) Oral every 6 hours PRN  artificial  tears Solution 1 Drop(s) Both EYES every 2 hours PRN  dextrose Oral Gel 15 Gram(s) Oral once PRN  ondansetron Injectable 4 milliGRAM(s) IV Push every 6 hours PRN        VITALS/PHYSICAL EXAM  --------------------------------------------------------------------------------  T(C): 36.4 (09-29-24 @ 15:30), Max: 37.1 (09-29-24 @ 08:35)  HR: 78 (09-30-24 @ 05:00) (67 - 78)  BP: 151/72 (09-30-24 @ 05:00) (116/54 - 151/72)  RR: 18 (09-29-24 @ 15:30) (17 - 18)  SpO2: 99% (09-29-24 @ 15:30) (98% - 99%)  Wt(kg): --        Physical Exam:  	Gen: NAD  	Pulm: decrease BS  B/L  	CV: S1S2; no rub  	Abd: distended  	LE: dressings  	Vascular access:av    LABS/STUDIES  --------------------------------------------------------------------------------              8.9    4.88  >-----------<  219      [09-30-24 @ 06:28]              27.4     134  |  96  |  21  ----------------------------<  120      [09-29-24 @ 06:20]  3.9   |  27  |  4.4        Ca     7.4     [09-29-24 @ 06:20]      Mg     1.7     [09-29-24 @ 06:20]      Phos  2.0     [09-29-24 @ 06:20]    TPro  6.1  /  Alb  3.0  /  TBili  0.4  /  DBili  x   /  AST  6   /  ALT  <5  /  AlkPhos  150  [09-29-24 @ 06:20]          Creatinine Trend:  SCr 4.4 [09-29 @ 06:20]  SCr 5.4 [09-28 @ 07:58]  SCr 4.3 [09-27 @ 06:53]  SCr 5.3 [09-26 @ 07:04]  SCr 4.6 [09-25 @ 06:49]    Urinalysis - [09-29-24 @ 06:20]      Color  / Appearance  / SG  / pH       Gluc 120 / Ketone   / Bili  / Urobili        Blood  / Protein  / Leuk Est  / Nitrite       RBC  / WBC  / Hyaline  / Gran  / Sq Epi  / Non Sq Epi  / Bacteria       PTH -- (Ca 7.8)      [09-24-24 @ 06:56]   135  Vitamin D (25OH) 15      [09-25-24 @ 06:49]  HbA1c 5.8      [01-30-20 @ 06:46]

## 2024-10-01 LAB
GLUCOSE BLDC GLUCOMTR-MCNC: 140 MG/DL — HIGH (ref 70–99)
GLUCOSE BLDC GLUCOMTR-MCNC: 151 MG/DL — HIGH (ref 70–99)
GLUCOSE BLDC GLUCOMTR-MCNC: 184 MG/DL — HIGH (ref 70–99)
IRON SATN MFR SERPL: 93 UG/DL — SIGNIFICANT CHANGE UP (ref 35–150)
TRANSFERRIN SERPL-MCNC: 75 MG/DL — LOW (ref 200–360)

## 2024-10-01 PROCEDURE — 93010 ELECTROCARDIOGRAM REPORT: CPT

## 2024-10-01 RX ORDER — METOCLOPRAMIDE HCL 5 MG
5 TABLET ORAL THREE TIMES A DAY
Refills: 0 | Status: DISCONTINUED | OUTPATIENT
Start: 2024-10-01 | End: 2024-10-07

## 2024-10-01 RX ADMIN — DARBEPOETIN ALFA 50 MICROGRAM(S): 100 INJECTION, SOLUTION INTRAVENOUS; SUBCUTANEOUS at 11:32

## 2024-10-01 RX ADMIN — Medication 75 MILLIGRAM(S): at 13:04

## 2024-10-01 RX ADMIN — PANTOPRAZOLE SODIUM 40 MILLIGRAM(S): 40 TABLET, DELAYED RELEASE ORAL at 05:40

## 2024-10-01 RX ADMIN — Medication 81 MILLIGRAM(S): at 13:04

## 2024-10-01 RX ADMIN — Medication 5000 UNIT(S): at 22:57

## 2024-10-01 RX ADMIN — Medication 1000 MICROGRAM(S): at 13:04

## 2024-10-01 RX ADMIN — Medication 0.4 MILLIGRAM(S): at 22:58

## 2024-10-01 RX ADMIN — FOLIC ACID 1 MILLIGRAM(S): 1 TABLET ORAL at 13:04

## 2024-10-01 RX ADMIN — Medication 3 UNIT(S): at 08:04

## 2024-10-01 RX ADMIN — Medication 25 MILLIGRAM(S): at 05:40

## 2024-10-01 RX ADMIN — Medication 60 MILLIGRAM(S): at 05:40

## 2024-10-01 RX ADMIN — Medication 5 MILLIGRAM(S): at 13:05

## 2024-10-01 RX ADMIN — Medication 5000 UNIT(S): at 13:06

## 2024-10-01 RX ADMIN — CHLORHEXIDINE GLUCONATE ORAL RINSE 1 APPLICATION(S): 1.2 SOLUTION DENTAL at 05:43

## 2024-10-01 RX ADMIN — ATORVASTATIN CALCIUM 40 MILLIGRAM(S): 10 TABLET, FILM COATED ORAL at 22:58

## 2024-10-01 RX ADMIN — Medication 5000 UNIT(S): at 05:40

## 2024-10-01 RX ADMIN — Medication 5 MILLIGRAM(S): at 22:57

## 2024-10-01 RX ADMIN — Medication 3 UNIT(S): at 17:25

## 2024-10-01 NOTE — PROGRESS NOTE ADULT - ASSESSMENT
64 year old man from SNF with a past medical history of ESRD on hemodialysis [facility] (last dialysis on 9/20/24), NIDDM, BPH, DM, CAD s/p  CABG, HTN, CHF, TIA, PAD, left toe ulcer s/p amputation sent in by PCP (Dr. Olivier) for increasing abdomen distention over the last 3 weeks. Patient with increase abdominal girth despite weight loss. Patient's weight done 20 lbs in the last month    Nausea, Vomiting, Abdominal discomfort, Vomiting again  Ascites : Likely Nephrogenic Ascites, No evidence of Cirrhosis  - GI / Hepatology evaluation noted  - paracentesis done with removal of 4 liters 9/25, and 6 liters on 9/27  - no evidence of SBP  - positive Abnormality : delayed emptying : gastroparesis    ESRD  - continue HMD  - weight continues trending down  - leg edema improved    CAD, post CABG, CHF  on rx  - await 2d echo results    PAD, hx of toe amputation  - podiatry follows as outpatient      GI prophylaxis   DVT Heparin  Diet resumed    Patient remains acute. Continue to monitor weight. Reviewed SNF chart, weight down from 226 lbs last month.  D/C planning back to [facility] when stable.

## 2024-10-01 NOTE — PROGRESS NOTE ADULT - SUBJECTIVE AND OBJECTIVE BOX
SCHWABACHERSIMON  64y  Male  HPI:  Pt is a 64-year-old man with a past medical history of ESRD on hemodialysis MWF (last dialysis on 9/20/24), NIDDM, BPH,DM, CAD s/p  CABG, HTN, CHF, TIA, PAD, left toe ulcer s/p amputation sent in by PCP (Dr. Olivier) for increasing abdomen distention over the last 3 weeks. Patient reported that he developed new onset abdominal distention over the past 3 weeks that is now causing him abdominal pain and difficulty having PO intake with vomiting. He reported that he had to vomit small amounts of food if he eats. He also reported being constipated over the past 2 days. He denied any hematemesis, sob, chest pain, orthopnea, blood in stools, swelling of the legs, fever or chills, He is aneuric and does not produce urine. He denied any history of smoking or excessive alcohol use. Patient presented to the ED on 9/16 and underwent a C, he was discharged on the same day without further work-up. Patient is currently in [facility]    In the ED:   T: 36.5      HR: 68    BP: 122/61    Spo2: 95% on room air    CT abdomen/pelvis from 9/16/24:   1. Since March 28, 2024, no significant change in large volume abdominopelvic ascites..2. Moderate to large right inguinal hernia contains ascites fluid and a   short segment of nonobstructed cecum and distal ileum.3. Unchanged small left and trace right pleural effusions with small   partially imaged bibasilar opacities.4. A 3.6 cm hyperattenuating focus within the lateral aspect of the right gluteus muscle is suspicious for small hematoma; correlation with clinical history and exam is suggested.           (23 Sep 2024 17:34)    MEDICATIONS  (STANDING):  aspirin  chewable 81 milliGRAM(s) Oral daily  atorvastatin 40 milliGRAM(s) Oral at bedtime  chlorhexidine 2% Cloths 1 Application(s) Topical <User Schedule>  clopidogrel Tablet 75 milliGRAM(s) Oral daily  cyanocobalamin 1000 MICROGram(s) Oral daily  darbepoetin Injectable Syringe 50 MICROGram(s) IV Push <User Schedule>  dextrose 5%. 1000 milliLiter(s) (50 mL/Hr) IV Continuous <Continuous>  dextrose 5%. 1000 milliLiter(s) (100 mL/Hr) IV Continuous <Continuous>  dextrose 50% Injectable 25 Gram(s) IV Push once  dextrose 50% Injectable 12.5 Gram(s) IV Push once  dextrose 50% Injectable 25 Gram(s) IV Push once  folic acid 1 milliGRAM(s) Oral daily  glucagon  Injectable 1 milliGRAM(s) IntraMuscular once  heparin   Injectable 5000 Unit(s) SubCutaneous every 8 hours  influenza  Vaccine (HIGH DOSE) 0.5 milliLiter(s) IntraMuscular once  insulin lispro Injectable (ADMELOG) 3 Unit(s) SubCutaneous before dinner  insulin lispro Injectable (ADMELOG) 3 Unit(s) SubCutaneous before breakfast  metoclopramide 5 milliGRAM(s) Oral three times a day  metoprolol succinate ER 25 milliGRAM(s) Oral daily  NIFEdipine XL 60 milliGRAM(s) Oral daily  pantoprazole    Tablet 40 milliGRAM(s) Oral before breakfast  senna 2 Tablet(s) Oral at bedtime  tamsulosin 0.4 milliGRAM(s) Oral at bedtime    MEDICATIONS  (PRN):  acetaminophen     Tablet .. 650 milliGRAM(s) Oral every 6 hours PRN Mild Pain (1 - 3)  artificial  tears Solution 1 Drop(s) Both EYES every 2 hours PRN Dry Eyes  dextrose Oral Gel 15 Gram(s) Oral once PRN Blood Glucose LESS THAN 70 milliGRAM(s)/deciliter  ondansetron Injectable 4 milliGRAM(s) IV Push every 6 hours PRN Nausea and/or Vomiting    INTERVAL EVENTS: Patient seen today on HMD, nauseous again    T(C): 36.6 (10-01-24 @ 15:46), Max: 36.6 (10-01-24 @ 15:46)  HR: 68 (10-01-24 @ 15:46) (68 - 80)  BP: 98/59 (10-01-24 @ 15:46) (98/59 - 131/60)  RR: 18 (10-01-24 @ 15:46) (18 - 18)  SpO2: 96% (10-01-24 @ 15:46) (90% - 96%)  Wt(kg): --Vital Signs Last 24 Hrs  T(C): 36.6 (01 Oct 2024 15:46), Max: 36.6 (01 Oct 2024 15:46)  T(F): 97.9 (01 Oct 2024 15:46), Max: 97.9 (01 Oct 2024 15:46)  HR: 68 (01 Oct 2024 15:46) (68 - 80)  BP: 98/59 (01 Oct 2024 15:46) (98/59 - 131/60)  BP(mean): --  RR: 18 (01 Oct 2024 15:46) (18 - 18)  SpO2: 96% (01 Oct 2024 15:46) (90% - 96%)    Parameters below as of 01 Oct 2024 15:46  Patient On (Oxygen Delivery Method): room air        PHYSICAL EXAM:  GENERAL:   NECK: Supple, No JVD, Normal thyroid  CHEST/LUNG: Clear; No crackles or wheezing  HEART: S1, S2, Regular rate and rhythm;   ABDOMEN: Soft, Nontender, Nondistended; Bowel sounds present  EXTREMITIES: No clubbing, cyanosis, or edema  SKIN: No rashes or lesions    LABS:    RADIOLOGY & ADDITIONAL TESTS:     SCHWABACHERSIMON  64y  Male  HPI:  Pt is a 64-year-old man with a past medical history of ESRD on hemodialysis MWF (last dialysis on 9/20/24), NIDDM, BPH,DM, CAD s/p  CABG, HTN, CHF, TIA, PAD, left toe ulcer s/p amputation sent in by PCP (Dr. Olivier) for increasing abdomen distention over the last 3 weeks. Patient reported that he developed new onset abdominal distention over the past 3 weeks that is now causing him abdominal pain and difficulty having PO intake with vomiting. He reported that he had to vomit small amounts of food if he eats. He also reported being constipated over the past 2 days. He denied any hematemesis, sob, chest pain, orthopnea, blood in stools, swelling of the legs, fever or chills, He is aneuric and does not produce urine. He denied any history of smoking or excessive alcohol use. Patient presented to the ED on 9/16 and underwent a C, he was discharged on the same day without further work-up. Patient is currently in [facility]    In the ED:   T: 36.5      HR: 68    BP: 122/61    Spo2: 95% on room air    CT abdomen/pelvis from 9/16/24:   1. Since March 28, 2024, no significant change in large volume abdominopelvic ascites..2. Moderate to large right inguinal hernia contains ascites fluid and a   short segment of nonobstructed cecum and distal ileum.3. Unchanged small left and trace right pleural effusions with small   partially imaged bibasilar opacities.4. A 3.6 cm hyperattenuating focus within the lateral aspect of the right gluteus muscle is suspicious for small hematoma; correlation with clinical history and exam is suggested.           (23 Sep 2024 17:34)    MEDICATIONS  (STANDING):  aspirin  chewable 81 milliGRAM(s) Oral daily  atorvastatin 40 milliGRAM(s) Oral at bedtime  chlorhexidine 2% Cloths 1 Application(s) Topical <User Schedule>  clopidogrel Tablet 75 milliGRAM(s) Oral daily  cyanocobalamin 1000 MICROGram(s) Oral daily  darbepoetin Injectable Syringe 50 MICROGram(s) IV Push <User Schedule>  dextrose 5%. 1000 milliLiter(s) (50 mL/Hr) IV Continuous <Continuous>  dextrose 5%. 1000 milliLiter(s) (100 mL/Hr) IV Continuous <Continuous>  dextrose 50% Injectable 25 Gram(s) IV Push once  dextrose 50% Injectable 12.5 Gram(s) IV Push once  dextrose 50% Injectable 25 Gram(s) IV Push once  folic acid 1 milliGRAM(s) Oral daily  glucagon  Injectable 1 milliGRAM(s) IntraMuscular once  heparin   Injectable 5000 Unit(s) SubCutaneous every 8 hours  influenza  Vaccine (HIGH DOSE) 0.5 milliLiter(s) IntraMuscular once  insulin lispro Injectable (ADMELOG) 3 Unit(s) SubCutaneous before dinner  insulin lispro Injectable (ADMELOG) 3 Unit(s) SubCutaneous before breakfast  metoclopramide 5 milliGRAM(s) Oral three times a day  metoprolol succinate ER 25 milliGRAM(s) Oral daily  NIFEdipine XL 60 milliGRAM(s) Oral daily  pantoprazole    Tablet 40 milliGRAM(s) Oral before breakfast  senna 2 Tablet(s) Oral at bedtime  tamsulosin 0.4 milliGRAM(s) Oral at bedtime    MEDICATIONS  (PRN):  acetaminophen     Tablet .. 650 milliGRAM(s) Oral every 6 hours PRN Mild Pain (1 - 3)  artificial  tears Solution 1 Drop(s) Both EYES every 2 hours PRN Dry Eyes  dextrose Oral Gel 15 Gram(s) Oral once PRN Blood Glucose LESS THAN 70 milliGRAM(s)/deciliter  ondansetron Injectable 4 milliGRAM(s) IV Push every 6 hours PRN Nausea and/or Vomiting    INTERVAL EVENTS: Patient seen today on HMD, nauseous again on dialysis    T(C): 36.6 (10-01-24 @ 15:46), Max: 36.6 (10-01-24 @ 15:46)  HR: 68 (10-01-24 @ 15:46) (68 - 80)  BP: 98/59 (10-01-24 @ 15:46) (98/59 - 131/60)  RR: 18 (10-01-24 @ 15:46) (18 - 18)  SpO2: 96% (10-01-24 @ 15:46) (90% - 96%)  Wt(kg): --Vital Signs Last 24 Hrs  T(C): 36.6 (01 Oct 2024 15:46), Max: 36.6 (01 Oct 2024 15:46)  T(F): 97.9 (01 Oct 2024 15:46), Max: 97.9 (01 Oct 2024 15:46)  HR: 68 (01 Oct 2024 15:46) (68 - 80)  BP: 98/59 (01 Oct 2024 15:46) (98/59 - 131/60)  BP(mean): --  RR: 18 (01 Oct 2024 15:46) (18 - 18)  SpO2: 96% (01 Oct 2024 15:46) (90% - 96%)    Parameters below as of 01 Oct 2024 15:46  Patient On (Oxygen Delivery Method): room air        PHYSICAL EXAM:   GENERAL: NAD, Pale  NECK: Supple, No JVD  CHEST/LUNG: Shallow Clear  HEART: S1, S2, Regular   ABDOMEN: Soft, Nontender, Nondistended; Bowel sounds present  EXTREMITIES: No clubbing, cyanosis, or edema  SKIN: No rashes or lesions    LABS:                          8.9    4.88  )-----------( 219      ( 30 Sep 2024 06:28 )             27.4             09-30    136  |  96[L]  |  31[H]  ----------------------------<  124[H]  4.2   |  30  |  5.3[HH]    Ca    7.6[L]      30 Sep 2024 06:28  Phos  2.4     09-30  Mg     1.7     09-30    TPro  6.2  /  Alb  3.0[L]  /  TBili  0.4  /  DBili  x   /  AST  5   /  ALT  <5  /  AlkPhos  147[H]  09-30    LIVER FUNCTIONS - ( 30 Sep 2024 06:28 )  Alb: 3.0 g/dL / Pro: 6.2 g/dL / ALK PHOS: 147 U/L / ALT: <5 U/L / AST: 5 U/L / GGT: x                         Urinalysis Basic - ( 30 Sep 2024 06:28 )    Color: x / Appearance: x / SG: x / pH: x  Gluc: 124 mg/dL / Ketone: x  / Bili: x / Urobili: x   Blood: x / Protein: x / Nitrite: x   Leuk Esterase: x / RBC: x / WBC x   Sq Epi: x / Non Sq Epi: x / Bacteria: x        RADIOLOGY & ADDITIONAL TESTS:  < from: NM Gastric Emptying Solid (09.30.24 @ 15:26) >  PROCEDURE DATE:  09/30/2024          INTERPRETATION:  GASTRIC EMPTYING STUDY - SOLID  [id number]  REASON: Gastroparesis  Symptoms:Nausea, emesis, abdominal pain, distention, constipation,   bloating    Diabetic: Yes    Medication history: no current GI motility medications    Surgical history: No history of surgery of the esophagus stomach or colon    Procedure:    Standard scintigraphic meal of 4 ounces liquid egg white labeled with 1   mCi 99m technetium sulfur colloid, prepared in a microwave, 2 slices of   white bread, 30 gm strawberry jam, and 4 ounces of water was prepared.    All of the egg white, All of the jam, both slices of bread, and all the   water were consumed over 10 minutes.    Patient was placed upright in front of a gamma camera in the best PJ   position and one minute duration images over the abdomen were obtained   immediately, one hour, two hours, and 4 hours after completing the meal    The images show abnormally sluggish emptying of the radiolabeled   standardized solid meal from the stomach over time with 76% retention at   4 hours post feeding (normal <10%) and 80% at 2 hours (normal range   30-60%).    Regions of interest were chosen around the stomach and stomach counts   were determined for each time point..  Time 0 -- standard -- 21,298  1 hour   17,144  2 hours 13,619  4 hours 10,228      Percent remaining in the stomach in relation to decayed standard time 0   counts:    1 hour    17,144/18,992          = 90 %         (normal 37 -- 90%)  2 hours  13,619/16,937          = 80 %         (normal 30 - 60%)  4 hours  10,228/13,469          = 76 %         (normal  0  - 10%)      Impression:  Abnormal study consistent with gastroparesis.    Abnormally delayed emptying of the radiolabeled standardized solid   material from the stomach over time with 76% retention at 4 hours post   feeding (normal <10%) and 80% at 2 hours post feeding (normal range   30-60%).    Percent remaining in the stomach in relation to decayed standard time 0   counts:    1 hour    = 90 %         (normal 37 -- 90%)  2 hours  = 80 %         (normal 30 - 60%)  4 hours  = 76 %         (normal  0  - 10%)    --- End of Report ---    < end of copied text >

## 2024-10-01 NOTE — PROGRESS NOTE ADULT - ASSESSMENT
64 year old man from [facility] with a past medical history of ESRD on hemodialysis [facility] (last dialysis on 9/20/24), NIDDM, BPH, DM, CAD s/p  CABG, HTN, CHF, TIA, PAD, left toe ulcer s/p amputation sent in by PCP (Dr. Olivier) for increasing abdomen distention over the last 3 weeks, and weight loss      #Nausea, Vomiting, Abdominal discomfort   # Gastroparesis  # weight loss   #Ascites : Likely Nephrogenic Ascites, No evidence of Cirrhosis  - GI / Hepatology evaluation noted  - paracentesis done with removal of 4 liters 9/25, no evidence of SBP, paracentesis repeated today 9/27 with removal of 6 liters  - Gastric emptying scan +ve for Gastroparesis: started on Reglan 5mg TID, will monitor qtc  - GI consult?     #ESRD  - continue HMD  - weight continues trending down  - leg edema improved  - nephro following, HD today     #Prominent right hilar lymph nodes measuring up to 1.6 cm  - follow up in 3 months is recommended for re-evaluation and exclude a   neoplastic process.      #CAD, post CABG, CHF  - 2d echo   1. Normal left ventricular internal cavity size.   2. Normal global left ventricular systolic function.   3. LV Ejection Fraction by Jose's Method with a biplane EF of 58 %.  - c/w furosemide, statin, aspirin and clopidogrel     #PAD, hx of toe amputation  - podiatry follows as outpatient    GI prophylaxis  DVT Heparin  Diet renal  dispo: [facility]  handoff: C/w HD, GI consult??

## 2024-10-01 NOTE — PROGRESS NOTE ADULT - SUBJECTIVE AND OBJECTIVE BOX
seen and examined  24 h events noted   no distress   lying comfortable         PAST HISTORY  --------------------------------------------------------------------------------  No significant changes to PMH, PSH, FHx, SHx, unless otherwise noted    ALLERGIES & MEDICATIONS  --------------------------------------------------------------------------------  Allergies    No Known Allergies    Intolerances      Standing Inpatient Medications  aspirin  chewable 81 milliGRAM(s) Oral daily  atorvastatin 40 milliGRAM(s) Oral at bedtime  chlorhexidine 2% Cloths 1 Application(s) Topical <User Schedule>  clopidogrel Tablet 75 milliGRAM(s) Oral daily  cyanocobalamin 1000 MICROGram(s) Oral daily  darbepoetin Injectable Syringe 50 MICROGram(s) IV Push <User Schedule>  dextrose 5%. 1000 milliLiter(s) IV Continuous <Continuous>  dextrose 5%. 1000 milliLiter(s) IV Continuous <Continuous>  dextrose 50% Injectable 25 Gram(s) IV Push once  dextrose 50% Injectable 12.5 Gram(s) IV Push once  dextrose 50% Injectable 25 Gram(s) IV Push once  folic acid 1 milliGRAM(s) Oral daily  glucagon  Injectable 1 milliGRAM(s) IntraMuscular once  heparin   Injectable 5000 Unit(s) SubCutaneous every 8 hours  influenza  Vaccine (HIGH DOSE) 0.5 milliLiter(s) IntraMuscular once  insulin lispro Injectable (ADMELOG) 3 Unit(s) SubCutaneous before breakfast  insulin lispro Injectable (ADMELOG) 3 Unit(s) SubCutaneous before dinner  metoprolol succinate ER 25 milliGRAM(s) Oral daily  NIFEdipine XL 60 milliGRAM(s) Oral daily  pantoprazole    Tablet 40 milliGRAM(s) Oral before breakfast  senna 2 Tablet(s) Oral at bedtime  tamsulosin 0.4 milliGRAM(s) Oral at bedtime    PRN Inpatient Medications  acetaminophen     Tablet .. 650 milliGRAM(s) Oral every 6 hours PRN  artificial  tears Solution 1 Drop(s) Both EYES every 2 hours PRN  dextrose Oral Gel 15 Gram(s) Oral once PRN  ondansetron Injectable 4 milliGRAM(s) IV Push every 6 hours PRN        VITALS/PHYSICAL EXAM  --------------------------------------------------------------------------------  T(C): 36.5 (09-30-24 @ 15:51), Max: 36.5 (09-30-24 @ 15:51)  HR: 76 (10-01-24 @ 05:00) (68 - 76)  BP: 126/66 (10-01-24 @ 05:00) (122/63 - 126/66)  RR: 18 (10-01-24 @ 00:03) (18 - 18)  SpO2: 94% (10-01-24 @ 00:03) (94% - 97%)  Wt(kg): --        Physical Exam:  	Gen: NAD  	Pulm: CTA B/L  	CV:  S1S2; no rub  	Abd: +distended  	LE: dressing   	Vascular access:av    LABS/STUDIES  --------------------------------------------------------------------------------              8.9    4.88  >-----------<  219      [09-30-24 @ 06:28]              27.4     136  |  96  |  31  ----------------------------<  124      [09-30-24 @ 06:28]  4.2   |  30  |  5.3        Ca     7.6     [09-30-24 @ 06:28]      Mg     1.7     [09-30-24 @ 06:28]      Phos  2.4     [09-30-24 @ 06:28]    TPro  6.2  /  Alb  3.0  /  TBili  0.4  /  DBili  x   /  AST  5   /  ALT  <5  /  AlkPhos  147  [09-30-24 @ 06:28]      Creatinine Trend:  SCr 5.3 [09-30 @ 06:28]  SCr 4.4 [09-29 @ 06:20]  SCr 5.4 [09-28 @ 07:58]  SCr 4.3 [09-27 @ 06:53]  SCr 5.3 [09-26 @ 07:04]    Urinalysis - [09-30-24 @ 06:28]      Color  / Appearance  / SG  / pH       Gluc 124 / Ketone   / Bili  / Urobili        Blood  / Protein  / Leuk Est  / Nitrite       RBC  / WBC  / Hyaline  / Gran  / Sq Epi  / Non Sq Epi  / Bacteria       Iron 93, TIBC --, %sat --      [10-01-24 @ 07:00]  PTH -- (Ca 7.8)      [09-24-24 @ 06:56]   135  Vitamin D (25OH) 15      [09-25-24 @ 06:49]  HbA1c 5.8      [01-30-20 @ 06:46]

## 2024-10-01 NOTE — PROGRESS NOTE ADULT - SUBJECTIVE AND OBJECTIVE BOX
24H events:    Patient is a 64y old Male who presents with a chief complaint of Abdominal distension, pain w N/V (30 Sep 2024 10:08)    Primary diagnosis of Abdominal distention      Day 1:  Day 2:  Day 3:     Today is hospital day 8d. This morning patient was seen and examined at bedside, resting comfortably in bed.    No acute or major events overnight.    Code Status:    Family communication:  Contact date:  Name of person contacted:  Relationship to patient:  Communication details:  What matters most:    PAST MEDICAL & SURGICAL HISTORY  S/P CABG (coronary artery bypass graft)  x4    Diabetes mellitus    Transient ischemic attack (TIA)  2017; 2008    Chronic kidney disease (CKD)  Stage IV    Hypertension    Stented coronary artery  in 2008    Myocardial infarction  2012    PAD (peripheral artery disease)  S/p bypass left leg    HLD (hyperlipidemia)    BPH (benign prostatic hyperplasia)    Pain in left knee  s/p fall    OA (osteoarthritis)    [place] (hard of hearing)    Chronic anemia    History of medical problems  left arm precaution    S/P CABG (coronary artery bypass graft)  2012    H/O arterial bypass of lower limb  Left Lower Extremity (2016)    History of surgery  Left CEA (2017)  Left Pinkie toe Amputation (2014)  CABG x 4 (2012)  Card cath - stent (2008)      AV fistula  2019  LEFT AV FISTULA      SOCIAL HISTORY:  Social History:      ALLERGIES:  No Known Allergies    MEDICATIONS:  STANDING MEDICATIONS  aspirin  chewable 81 milliGRAM(s) Oral daily  atorvastatin 40 milliGRAM(s) Oral at bedtime  chlorhexidine 2% Cloths 1 Application(s) Topical <User Schedule>  clopidogrel Tablet 75 milliGRAM(s) Oral daily  cyanocobalamin 1000 MICROGram(s) Oral daily  darbepoetin Injectable Syringe 50 MICROGram(s) IV Push <User Schedule>  dextrose 5%. 1000 milliLiter(s) IV Continuous <Continuous>  dextrose 5%. 1000 milliLiter(s) IV Continuous <Continuous>  dextrose 50% Injectable 25 Gram(s) IV Push once  dextrose 50% Injectable 12.5 Gram(s) IV Push once  dextrose 50% Injectable 25 Gram(s) IV Push once  folic acid 1 milliGRAM(s) Oral daily  glucagon  Injectable 1 milliGRAM(s) IntraMuscular once  heparin   Injectable 5000 Unit(s) SubCutaneous every 8 hours  influenza  Vaccine (HIGH DOSE) 0.5 milliLiter(s) IntraMuscular once  insulin lispro Injectable (ADMELOG) 3 Unit(s) SubCutaneous before dinner  insulin lispro Injectable (ADMELOG) 3 Unit(s) SubCutaneous before breakfast  metoclopramide 5 milliGRAM(s) Oral three times a day  metoprolol succinate ER 25 milliGRAM(s) Oral daily  NIFEdipine XL 60 milliGRAM(s) Oral daily  pantoprazole    Tablet 40 milliGRAM(s) Oral before breakfast  senna 2 Tablet(s) Oral at bedtime  tamsulosin 0.4 milliGRAM(s) Oral at bedtime    PRN MEDICATIONS  acetaminophen     Tablet .. 650 milliGRAM(s) Oral every 6 hours PRN  artificial  tears Solution 1 Drop(s) Both EYES every 2 hours PRN  dextrose Oral Gel 15 Gram(s) Oral once PRN  ondansetron Injectable 4 milliGRAM(s) IV Push every 6 hours PRN    VITALS:   T(F): 97.3  HR: 76  BP: 108/66  RR: 18  SpO2: 90%    PHYSICAL EXAM:  GENERAL: NAD, lying in bed comfortably  HEAD:  Atraumatic, normocephalic  EYES: EOMI, PERRL  NECK: Supple, trachea midline, no JVD  HEART: Regular rate and rhythm  LUNGS: Unlabored respirations.  Clear to auscultation bilaterally, no crackles, wheezing, or rhonchi  ABDOMEN: Soft, nontender, nondistended, +BS  EXTREMITIES: 2+ peripheral pulses bilaterally. left av fistula  No clubbing, cyanosis, or edema  NERVOUS SYSTEM:  A&Ox3, moving all extremities, no focal deficits       (  ) Indwelling Lacey Catheter:   Date insterted:    Reason (  ) Critical illness     (  ) urinary retention    (  ) Accurate Ins/Outs Monitoring     (  ) CMO patient    (  ) Central Line:   Date inserted:  Location: (  ) Right IJ     (  ) Left IJ     (  ) Right Fem     (  ) Left Fem    (  ) SPC        (  ) pigtail       (  ) PEG tube       (  ) colostomy       (  ) jejunostomy  (  ) U-Dall    LABS:                        8.9    4.88  )-----------( 219      ( 30 Sep 2024 06:28 )             27.4     09-30    136  |  96[L]  |  31[H]  ----------------------------<  124[H]  4.2   |  30  |  5.3[HH]    Ca    7.6[L]      30 Sep 2024 06:28  Phos  2.4     09-30  Mg     1.7     09-30    TPro  6.2  /  Alb  3.0[L]  /  TBili  0.4  /  DBili  x   /  AST  5   /  ALT  <5  /  AlkPhos  147[H]  09-30      Urinalysis Basic - ( 30 Sep 2024 06:28 )    Color: x / Appearance: x / SG: x / pH: x  Gluc: 124 mg/dL / Ketone: x  / Bili: x / Urobili: x   Blood: x / Protein: x / Nitrite: x   Leuk Esterase: x / RBC: x / WBC x   Sq Epi: x / Non Sq Epi: x / Bacteria: x

## 2024-10-01 NOTE — PROGRESS NOTE ADULT - PROVIDER SPECIALTY LIST ADULT
Neurology [Follow-Up: _____] : a [unfilled] follow-up visit [FreeTextEntry1] : The patient is a postmenopausal white female of Citizen of [place] and [place] descent with no family history of breast or ovarian cancer.  She was found to have some increasing calcifications in her left breast 6:00 region on screening mammography in January 2024 and underwent a stereotactic core biopsy at [facility] in February 2024 showing high nuclear grade DCIS which was ER/MA positive.  MRI showed localized but significant non-mass like enhancement around the calcifications.  She was seen by Dr. Demetrice White at [facility] and underwent a left breast partial mastectomy and sentinel lymph node biopsy on April 9, 2024 and had 1 negative sentinel lymph node and the partial mastectomy showed fairly extensive DCIS in 10 out of 20 slides with a positive superior and medial margin and close inferior and anterior margins less than 1 mm.  She was seen as a second opinion in April 2024 and underwent [facility] genetic panel testing which was negative with a VUS in the BRIP1 gene.  Images were reviewed and it was felt that mastectomy was indicated.  The patient chose to undergo bilateral mastectomies and this was performed with a nipple sparing technique with prepectoral direct to implant reconstructions by Dr. Rodgers on June 12, 2024.  The final pathology just showed residual high-grade DCIS measuring up to 1.5 cm with completely clear margins and the retroareolar biopsy was negative.  The prophylactic right breast mastectomy was benign as well as the retroareolar biopsy.  She was seen by Dr. Snow and no adjuvant endocrine therapy was recommended given the fact that she had bilateral mastectomies for DCIS.  She comes in now for routine follow-up. [FreeTextEntry2] : June 12, 2024

## 2024-10-02 LAB
FERRITIN SERPL-MCNC: 1129 NG/ML — HIGH (ref 30–400)
GLUCOSE BLDC GLUCOMTR-MCNC: 127 MG/DL — HIGH (ref 70–99)
GLUCOSE BLDC GLUCOMTR-MCNC: 155 MG/DL — HIGH (ref 70–99)
GLUCOSE BLDC GLUCOMTR-MCNC: 164 MG/DL — HIGH (ref 70–99)
GLUCOSE BLDC GLUCOMTR-MCNC: 172 MG/DL — HIGH (ref 70–99)

## 2024-10-02 RX ADMIN — Medication 5000 UNIT(S): at 13:23

## 2024-10-02 RX ADMIN — Medication 5 MILLIGRAM(S): at 13:23

## 2024-10-02 RX ADMIN — Medication 5000 UNIT(S): at 21:30

## 2024-10-02 RX ADMIN — FOLIC ACID 1 MILLIGRAM(S): 1 TABLET ORAL at 11:48

## 2024-10-02 RX ADMIN — ATORVASTATIN CALCIUM 40 MILLIGRAM(S): 10 TABLET, FILM COATED ORAL at 21:32

## 2024-10-02 RX ADMIN — Medication 2 TABLET(S): at 21:32

## 2024-10-02 RX ADMIN — Medication 0.4 MILLIGRAM(S): at 21:32

## 2024-10-02 RX ADMIN — Medication 5000 UNIT(S): at 05:35

## 2024-10-02 RX ADMIN — Medication 5 MILLIGRAM(S): at 21:31

## 2024-10-02 RX ADMIN — Medication 3 UNIT(S): at 07:53

## 2024-10-02 RX ADMIN — Medication 60 MILLIGRAM(S): at 05:35

## 2024-10-02 RX ADMIN — PANTOPRAZOLE SODIUM 40 MILLIGRAM(S): 40 TABLET, DELAYED RELEASE ORAL at 05:35

## 2024-10-02 RX ADMIN — Medication 5 MILLIGRAM(S): at 05:35

## 2024-10-02 RX ADMIN — Medication 25 MILLIGRAM(S): at 05:35

## 2024-10-02 RX ADMIN — Medication 1000 MICROGRAM(S): at 11:48

## 2024-10-02 RX ADMIN — Medication 75 MILLIGRAM(S): at 11:48

## 2024-10-02 RX ADMIN — Medication 81 MILLIGRAM(S): at 11:48

## 2024-10-02 RX ADMIN — Medication 3 UNIT(S): at 17:16

## 2024-10-02 RX ADMIN — CHLORHEXIDINE GLUCONATE ORAL RINSE 1 APPLICATION(S): 1.2 SOLUTION DENTAL at 05:38

## 2024-10-02 NOTE — PROGRESS NOTE ADULT - TIME BILLING
spending 36 minutes on this patient's case:  11    minutes w patient, 12   minutes reviewing the chart,    and  13   minutes speaking to the HO, RN and family, also coordinating care and documenting all.

## 2024-10-02 NOTE — PHYSICAL THERAPY INITIAL EVALUATION ADULT - ADDITIONAL COMMENTS
Patient lives in NH as a long term resident. Was assisted in ADLs and ambulation using RW. Was receiving PT treatments in NH to improve ambulation.

## 2024-10-02 NOTE — PHYSICAL THERAPY INITIAL EVALUATION ADULT - IMPAIRED TRANSFERS: SIT/STAND, REHAB EVAL
c/o diziness on prolonged standing/impaired balance/cognition/impaired coordination/decreased strength

## 2024-10-02 NOTE — PROGRESS NOTE ADULT - SUBJECTIVE AND OBJECTIVE BOX
seen and examined  24 h events noted   no distress         PAST HISTORY  --------------------------------------------------------------------------------  No significant changes to PMH, PSH, FHx, SHx, unless otherwise noted    ALLERGIES & MEDICATIONS  --------------------------------------------------------------------------------  Allergies    No Known Allergies    Intolerances      Standing Inpatient Medications  aspirin  chewable 81 milliGRAM(s) Oral daily  atorvastatin 40 milliGRAM(s) Oral at bedtime  chlorhexidine 2% Cloths 1 Application(s) Topical <User Schedule>  clopidogrel Tablet 75 milliGRAM(s) Oral daily  cyanocobalamin 1000 MICROGram(s) Oral daily  darbepoetin Injectable Syringe 50 MICROGram(s) IV Push <User Schedule>  dextrose 5%. 1000 milliLiter(s) IV Continuous <Continuous>  dextrose 5%. 1000 milliLiter(s) IV Continuous <Continuous>  dextrose 50% Injectable 25 Gram(s) IV Push once  dextrose 50% Injectable 25 Gram(s) IV Push once  dextrose 50% Injectable 12.5 Gram(s) IV Push once  folic acid 1 milliGRAM(s) Oral daily  glucagon  Injectable 1 milliGRAM(s) IntraMuscular once  heparin   Injectable 5000 Unit(s) SubCutaneous every 8 hours  influenza  Vaccine (HIGH DOSE) 0.5 milliLiter(s) IntraMuscular once  insulin lispro Injectable (ADMELOG) 3 Unit(s) SubCutaneous before breakfast  insulin lispro Injectable (ADMELOG) 3 Unit(s) SubCutaneous before dinner  metoclopramide 5 milliGRAM(s) Oral three times a day  metoprolol succinate ER 25 milliGRAM(s) Oral daily  NIFEdipine XL 60 milliGRAM(s) Oral daily  pantoprazole    Tablet 40 milliGRAM(s) Oral before breakfast  senna 2 Tablet(s) Oral at bedtime  tamsulosin 0.4 milliGRAM(s) Oral at bedtime    PRN Inpatient Medications  acetaminophen     Tablet .. 650 milliGRAM(s) Oral every 6 hours PRN  artificial  tears Solution 1 Drop(s) Both EYES every 2 hours PRN  dextrose Oral Gel 15 Gram(s) Oral once PRN  ondansetron Injectable 4 milliGRAM(s) IV Push every 6 hours PRN        VITALS/PHYSICAL EXAM  --------------------------------------------------------------------------------  T(C): 36.6 (10-01-24 @ 15:46), Max: 36.6 (10-01-24 @ 15:46)  HR: 80 (10-02-24 @ 05:00) (68 - 80)  BP: 134/62 (10-02-24 @ 05:00) (98/59 - 134/62)  RR: 18 (10-01-24 @ 15:46) (18 - 18)  SpO2: 96% (10-01-24 @ 15:46) (96% - 96%)  Wt(kg): --        10-01-24 @ 07:01  -  10-02-24 @ 07:00  --------------------------------------------------------  IN: 0 mL / OUT: 2500 mL / NET: -2500 mL      Physical Exam:  	Gen: NAD  	Pulm: CTA B/L  	CV: S1S2; no rub  	Abd: +distended  	LE: dressing   	Vascular access:av    LABS/STUDIES  --------------------------------------------------------------------------------            Creatinine Trend:  SCr 5.3 [09-30 @ 06:28]  SCr 4.4 [09-29 @ 06:20]  SCr 5.4 [09-28 @ 07:58]  SCr 4.3 [09-27 @ 06:53]  SCr 5.3 [09-26 @ 07:04]    Urinalysis - [09-30-24 @ 06:28]      Color  / Appearance  / SG  / pH       Gluc 124 / Ketone   / Bili  / Urobili        Blood  / Protein  / Leuk Est  / Nitrite       RBC  / WBC  / Hyaline  / Gran  / Sq Epi  / Non Sq Epi  / Bacteria       Iron 93, TIBC --, %sat --      [10-01-24 @ 07:00]  Ferritin 1129      [10-01-24 @ 07:00]  PTH -- (Ca 7.8)      [09-24-24 @ 06:56]   135  Vitamin D (25OH) 15      [09-25-24 @ 06:49]  HbA1c 5.8      [01-30-20 @ 06:46]

## 2024-10-02 NOTE — PROGRESS NOTE ADULT - SUBJECTIVE AND OBJECTIVE BOX
Chart reviewed, patient examined. Pertinent results reviewed.  Case discussed with HO; specialist f/u reviewed  HD#10  SUBJECTIVE:  HPI:  Pt is a 64-year-old man with a past medical history of ESRD on hemodialysis MWF (last dialysis on 9/20/24), NIDDM, BPH,DM, CAD s/p  CABG, HTN, CHF, TIA, PAD, left toe ulcer s/p amputation sent in by PCP (Dr. Olivier) for increasing abdomen distention over the last 3 weeks. Patient reported that he developed new onset abdominal distention over the past 3 weeks that is now causing him abdominal pain and difficulty having PO intake with N&V. He reported that he had to vomit small amounts of food if he eats. He also reported being constipated over the past 2 days. He denied any hematemesis, sob, chest pain, orthopnea, blood in stools, swelling of the legs, fever or chills, He is aneuric and does not produce urine. He denied any history of smoking or excessive alcohol use. Patient presented to the ED on 9/16 and underwent a CT, he was discharged on the same day without further work-up. Patient is currently in [facility]    ED VS reviewed    CT abdomen/pelvis from 9/16/24:   1. Since March 28, 2024, no significant change in large volume abdominopelvic ascites..2. Moderate to large right inguinal hernia contains ascites fluid and a   short segment of nonobstructed cecum and distal ileum.3. Unchanged small left and trace right pleural effusions with small   partially imaged bibasilar opacities.4. A 3.6 cm hyperattenuating focus within the lateral aspect of the right gluteus muscle is suspicious for small hematoma; correlation with clinical history and exam is suggested.    interval: patient continues to c/o anorexia, N&V- eating very little.  Has had paracentesis x 2 and aggressive HD  Patient Is status post a gastric emptying test       PAST MEDICAL & SURGICAL HISTORY  S/P CABG (coronary artery bypass graft)  x4    Diabetes mellitus    Transient ischemic attack (TIA)  2017; 2008    Chronic kidney disease (CKD)  Stage IV- on HD about 6 years; makes min urine    Hypertension    Stented coronary artery  in 2008    Myocardial infarction  2012    PAD (peripheral artery disease)  S/p bypass left leg    HLD (hyperlipidemia)    BPH (benign prostatic hyperplasia)    Pain in left knee  s/p fall    OA (osteoarthritis)    [place] (hard of hearing)    Chronic anemia    History of medical problems  left arm precaution    S/P CABG (coronary artery bypass graft)  2012    H/O arterial bypass of lower limb  Left Lower Extremity (2016)    History of surgery  Left CEA (2017)  Left Pinkie toe Amputation (2014)  CABG x 4 (2012)  Card cath - stent (2008)      AV fistula  2019  LEFT AV FISTULA  SH: living @ SNF > 1 year;   ROS: as in HPI; F/U w POD; Min walking at SNF; no Fever or chills or bleeding  Interval: Seen by GI and hepatology. Still w repeated N&V; planned for and had Paracentesis in IR today- 4000Ml.    ALLERGIES:  No Known Allergies    MEDICATIONS:  MEDICATIONS  (STANDING):  aspirin  chewable 81 milliGRAM(s) Oral daily  atorvastatin 40 milliGRAM(s) Oral at bedtime  chlorhexidine 2% Cloths 1 Application(s) Topical <User Schedule>  clopidogrel Tablet 75 milliGRAM(s) Oral daily  cyanocobalamin 1000 MICROGram(s) Oral daily  darbepoetin Injectable Syringe 50 MICROGram(s) IV Push <User Schedule>  dextrose 5%. 1000 milliLiter(s) (100 mL/Hr) IV Continuous <Continuous>  dextrose 5%. 1000 milliLiter(s) (50 mL/Hr) IV Continuous <Continuous>  dextrose 50% Injectable 25 Gram(s) IV Push once  dextrose 50% Injectable 12.5 Gram(s) IV Push once  dextrose 50% Injectable 25 Gram(s) IV Push once  folic acid 1 milliGRAM(s) Oral daily  glucagon  Injectable 1 milliGRAM(s) IntraMuscular once  heparin   Injectable 5000 Unit(s) SubCutaneous every 8 hours  influenza  Vaccine (HIGH DOSE) 0.5 milliLiter(s) IntraMuscular once  insulin lispro Injectable (ADMELOG) 3 Unit(s) SubCutaneous before dinner  insulin lispro Injectable (ADMELOG) 3 Unit(s) SubCutaneous before breakfast  metoclopramide 5 milliGRAM(s) Oral three times a day  metoprolol succinate ER 25 milliGRAM(s) Oral daily  NIFEdipine XL 60 milliGRAM(s) Oral daily  pantoprazole    Tablet 40 milliGRAM(s) Oral before breakfast  senna 2 Tablet(s) Oral at bedtime  tamsulosin 0.4 milliGRAM(s) Oral at bedtime    MEDICATIONS  (PRN):  acetaminophen     Tablet .. 650 milliGRAM(s) Oral every 6 hours PRN Mild Pain (1 - 3)  artificial  tears Solution 1 Drop(s) Both EYES every 2 hours PRN Dry Eyes  dextrose Oral Gel 15 Gram(s) Oral once PRN Blood Glucose LESS THAN 70 milliGRAM(s)/deciliter  ondansetron Injectable 4 milliGRAM(s) IV Push every 6 hours PRN Nausea and/or Vomiting    VITALS:   Vital Signs Last 24 Hrs  T(C): 36.3 (02 Oct 2024 07:00), Max: 36.6 (01 Oct 2024 15:46)  T(F): 97.4 (02 Oct 2024 07:00), Max: 97.9 (01 Oct 2024 15:46)  HR: 77 (02 Oct 2024 07:00) (68 - 80)  BP: 127/63 (02 Oct 2024 07:00) (98/59 - 134/62)  BP(mean): --  RR: 17 (02 Oct 2024 07:00) (17 - 18)  SpO2: 97% (02 Oct 2024 07:00) (96% - 97%)    Parameters below as of 02 Oct 2024 07:00  Patient On (Oxygen Delivery Method): room air            LABS:                               8.9    4.88  )-----------( 219      ( 30 Sep 2024 06:28 )             27.4                       8.8    4.51  )-----------( 173      ( 25 Sep 2024 06:49 )             28.0       09-30    136  |  96[L]  |  31[H]  ----------------------------<  124[H]  4.2   |  30  |  5.3[HH]    Ca    7.6[L]      30 Sep 2024 06:28  Phos  2.4     09-30  Mg     1.7     09-30    TPro  6.2  /  Alb  3.0[L]  /  TBili  0.4  /  DBili  x   /  AST  5   /  ALT  <5  /  AlkPhos  147[H]  09-30    09-25    135  |  95[L]  |  24[H]  ----------------------------<  81  4.0   |  25  |  4.6[HH]    Ca    7.9[L]      25 Sep 2024 06:49  Phos  2.8     09-25  Mg     1.9     09-25    TPro  6.7  /  Alb  2.9[L]  /  TBili  0.4  /  DBili  x   /  AST  6   /  ALT  <5  /  AlkPhos  153[H]  09-25      Urinalysis Basic - ( 25 Sep 2024 06:49 )    Color: x / Appearance: x / SG: x / pH: x  Gluc: 81 mg/dL / Ketone: x  / Bili: x / Urobili: x   Blood: x / Protein: x / Nitrite: x   Leuk Esterase: x / RBC: x / WBC x   Sq Epi: x / Non Sq Epi: x / Bacteria: x                    PHYSICAL EXAM:  GEN: No acute distress; NETTA; pale; supine in bed; AAOx4; weak and tired  H: AT/NC; ; TH: MMM; fair dentition  LUNGS: Clear to auscultation  HT: sternotomy scar; RRR, no MRG   ABD:  +2 distended- NT;  soft- no HSM or Mass; + UMB hernia  EXT: No pedal edema- legs wrapped w UNNA boots.  NEURO: AAOX4; Gen weak; legs mi-mod weak and atrophic    < from: NM Gastric Emptying Solid (09.30.24 @ 15:26) >  ACC: [id number] EXAM:  NM GASTRIC EMPTYING SOLID   ORDERED BY: SANTINO CAICEDO     PROCEDURE DATE:  09/30/2024          INTERPRETATION:  GASTRIC EMPTYING STUDY - SOLID  [id number]  REASON: Gastroparesis  Symptoms:Nausea, emesis, abdominal pain, distention, constipation,   bloating    Diabetic: Yes    Medication history: no current GI motility medications    Surgical history: No history of surgery of the esophagus stomach or colon    Procedure:    Standard scintigraphic meal of 4 ounces liquid egg white labeled with 1   mCi 99m technetium sulfur colloid, prepared in a microwave, 2 slices of   white bread, 30 gm strawberry jam, and 4 ounces of water was prepared.    All of the egg white, All of the jam, both slices of bread, and all the   water wereconsumed over 10 minutes.    Patient was placed upright in front of a gamma camera in the best Frisian   position and one minute duration images over the abdomen were obtained   immediately, one hour, two hours, and 4 hours after completing the meal    The images show abnormally sluggish emptying of the radiolabeled   standardized solid meal from the stomach over time with 76% retention at   4 hours post feeding (normal <10%) and 80% at 2 hours (normal range   30-60%).    Regions of interest were chosen around the stomach and stomach counts   were determined for each time point..  Time 0 -- standard -- 21,298  1 hour   17,144  2 hours 13,619  4 hours 10,228      Percent remaining in the stomach in relation to decayed standard time 0   counts:    1 hour    17,144/18,992          = 90 %         (normal 37 -- 90%)  2 hours  13,619/16,937          = 80 %         (normal 30 - 60%)  4 hours  10,228/13,469          = 76 %         (normal  0  - 10%)      Impression:  Abnormal study consistent with gastroparesis.    Abnormally delayed emptying of the radiolabeled standardized solid   material from the stomach over time with 76% retention at 4 hours post   feeding (normal <10%) and 80% at 2 hours post feeding (normal range   30-60%).    Percent remaining in the stomach in relation to decayed standard time 0   counts:    1 hour    = 90 %         (normal 37 -- 90%)  2 hours  = 80 %         (normal 30 - 60%)  4 hours  = 76 %         (normal  0  - 10%)    --- End of Report ---          RYLAN SCHMIDT MD; Resident Radiologist  This document has been electronically signed.  MIA NEWTON DO; Attending Nuclear Medicine  This document has been electronically signed. Sep 30 2024  4:05PM    < end of copied text >

## 2024-10-02 NOTE — PHYSICAL THERAPY INITIAL EVALUATION ADULT - GENERAL OBSERVATIONS, REHAB EVAL
Patient encountered lying in bed. (+) orthostatic hypotension. C/o dizziness on prolonged standing or sitting. Nurse notified.

## 2024-10-02 NOTE — PHYSICAL THERAPY INITIAL EVALUATION ADULT - PERTINENT HX OF CURRENT PROBLEM, REHAB EVAL
Pt is a 64-year-old man with a past medical history of ESRD on hemodialysis MWF (last dialysis on 9/20/24), NIDDM, BPH,DM, CAD s/p  CABG, HTN, CHF, TIA, PAD, left toe ulcer s/p amputation sent in by PCP (Dr. Olivier) for increasing abdomen distention over the last 3 weeks. Patient reported that he developed new onset abdominal distention over the past 3 weeks that is now causing him abdominal pain and difficulty having PO intake with vomiting. He reported that he had to vomit small amounts of food if he eats. He also reported being constipated over the past 2 days. He denied any hematemesis, sob, chest pain, orthopnea, blood in stools, swelling of the legs, fever or chills, He is aneuric and does not produce urine. He denied any history of smoking or excessive alcohol use. Patient presented to the ED on 9/16 and underwent a C, he was discharged on the same day without further work-up. Patient is currently in Rehab.

## 2024-10-02 NOTE — PROGRESS NOTE ADULT - ASSESSMENT
64-year-old man with a past medical history of ESRD on hemodialysis MWF (last dialysis on 9/20/24), NIDDM, BPH,DM, CAD s/p  CABG, HTN, CHF, TIA, PAD, left toe ulcer s/p amputation sent in by PCP (Dr. Olivier) for increasing abdomen distention over the last 3 weeks.  HD in am   3h opti 160 UF 2l    last phos noted, no binders   follow bp readings if on the low side , decrease nifedipine to 30   s/p paracentesis with 4 liters fluid removal 9/25 followed by hepatology    last h/h noted  on RAMSEY  / ferritin on the high side/ sat pending   will follow

## 2024-10-02 NOTE — PROGRESS NOTE ADULT - ASSESSMENT
#ESRD on HD MWF  #HFmrEF-Last TTE in 2022  - Patient cannot have po intake without vomiting sips of food. Lying comfortably in bed. Vitals WNL. PE showed distended abdomen with clear lungs  -In the ED:   T: 36.5      HR: 68    BP: 122/61    Spo2: 95% on room air  -Alk Phos: 167  -CT abdomen/pelvis from 9/16/24:   1. Since March 28, 2024, no significant change in large volume abdominopelvic ascites..2. Moderate to large right inguinal hernia contains ascites fluid and a   short segment of nonobstructed cecum and distal ileum.3. Unchanged small left and trace right pleural effusions with small   partially imaged bibasilar opacities.4. A 3.6 cm hyperattenuating focus within the lateral aspect of the right gluteus muscle is suspicious for small hematoma; correlation with clinical history and exam is suggested.  - TTE from 2022:  Left ventricular ejection fraction, by visual estimation, is 45 to 50%. Grade III diastolic dysfunction with elevated left atrial and left ventricular end-diastolic pressures. Moderate mitral regurgitation  - Patient on home PO lasix 40mg, aldactone 25 mg  -  Procedure team consult for paracentesis is in.  -  f/u ascites lab work-up. Extensive work-up ordered  - f/u Pro-Bnp   - f/u TTE  - f/u am Phosphorus and PTH  - f/u with nephrology for HD. Last HD was on saturday- agrees he needs aggressive fluid removal for cardio-renal fluid overload  - f/u with GI - see notes: tap and test fluid; INCREASE DIALYSIS????- RENAL TO ADVISE ALSO- OP HD vs HOME DIALYSIS  - Place patient on renal diet; CLEAR LIQUIDS 1ST         see abd XRAy- ? CONTRAST- FROM??; CT 9/16 SAYS NO PO CONTRAST  - TRY GASTROGRAFFIN STUDY- W ABD XRAYS- & REVW gi/hep  -  Gastric emptying study noted with marked delay of food from his stomach.  Patient with anorexia today eating very little.  Need GI review and will ask nutrition for input-Dr. Howell  - received paracentesis Multiple so far; fluid quickly reaccumulate's-nontender   - added calorie count as well    #CAD s/p CABG;BNP HIGH, BUT WAS HIGHER LAST ADMIT  #HTN  #History of TIA  - On home Plavix 75 mg daily, Aspirin 81 mg  - On home metoprolol succinate 25 mg , Nifedipine 60 mg daily    #DM  #left toe ulcer s/p amputation   - On Humalog 5 units before breakfast and dinner  - Will decrease it to 3U given patient is not eating that much for now    #Chronic constipation  - On Miralax and senna 2 tabs at night    #BPH  - On flomax 0.4 mg     #Full code  #DVT prophylaxis: Heparin sub q

## 2024-10-03 LAB
ALBUMIN SERPL ELPH-MCNC: 3 G/DL — LOW (ref 3.5–5.2)
ALP SERPL-CCNC: 177 U/L — HIGH (ref 30–115)
ALT FLD-CCNC: <5 U/L — SIGNIFICANT CHANGE UP (ref 0–41)
ANION GAP SERPL CALC-SCNC: 10 MMOL/L — SIGNIFICANT CHANGE UP (ref 7–14)
AST SERPL-CCNC: 5 U/L — SIGNIFICANT CHANGE UP (ref 0–41)
BASOPHILS # BLD AUTO: 0.05 K/UL — SIGNIFICANT CHANGE UP (ref 0–0.2)
BASOPHILS NFR BLD AUTO: 1.2 % — HIGH (ref 0–1)
BILIRUB SERPL-MCNC: 0.5 MG/DL — SIGNIFICANT CHANGE UP (ref 0.2–1.2)
BUN SERPL-MCNC: 20 MG/DL — SIGNIFICANT CHANGE UP (ref 10–20)
CALCIUM SERPL-MCNC: 7.8 MG/DL — LOW (ref 8.4–10.4)
CHLORIDE SERPL-SCNC: 96 MMOL/L — LOW (ref 98–110)
CO2 SERPL-SCNC: 31 MMOL/L — SIGNIFICANT CHANGE UP (ref 17–32)
CREAT SERPL-MCNC: 3.7 MG/DL — HIGH (ref 0.7–1.5)
EGFR: 17 ML/MIN/1.73M2 — LOW
EOSINOPHIL # BLD AUTO: 0.22 K/UL — SIGNIFICANT CHANGE UP (ref 0–0.7)
EOSINOPHIL NFR BLD AUTO: 5.3 % — SIGNIFICANT CHANGE UP (ref 0–8)
GLUCOSE BLDC GLUCOMTR-MCNC: 158 MG/DL — HIGH (ref 70–99)
GLUCOSE BLDC GLUCOMTR-MCNC: 177 MG/DL — HIGH (ref 70–99)
GLUCOSE BLDC GLUCOMTR-MCNC: 191 MG/DL — HIGH (ref 70–99)
GLUCOSE SERPL-MCNC: 135 MG/DL — HIGH (ref 70–99)
HCT VFR BLD CALC: 25.3 % — LOW (ref 42–52)
HGB BLD-MCNC: 8 G/DL — LOW (ref 14–18)
IMM GRANULOCYTES NFR BLD AUTO: 0.2 % — SIGNIFICANT CHANGE UP (ref 0.1–0.3)
LYMPHOCYTES # BLD AUTO: 0.45 K/UL — LOW (ref 1.2–3.4)
LYMPHOCYTES # BLD AUTO: 10.9 % — LOW (ref 20.5–51.1)
MAGNESIUM SERPL-MCNC: 1.8 MG/DL — SIGNIFICANT CHANGE UP (ref 1.8–2.4)
MCHC RBC-ENTMCNC: 26.4 PG — LOW (ref 27–31)
MCHC RBC-ENTMCNC: 31.6 G/DL — LOW (ref 32–37)
MCV RBC AUTO: 83.5 FL — SIGNIFICANT CHANGE UP (ref 80–94)
MONOCYTES # BLD AUTO: 0.41 K/UL — SIGNIFICANT CHANGE UP (ref 0.1–0.6)
MONOCYTES NFR BLD AUTO: 9.9 % — HIGH (ref 1.7–9.3)
NEUTROPHILS # BLD AUTO: 2.99 K/UL — SIGNIFICANT CHANGE UP (ref 1.4–6.5)
NEUTROPHILS NFR BLD AUTO: 72.5 % — SIGNIFICANT CHANGE UP (ref 42.2–75.2)
NRBC # BLD: 0 /100 WBCS — SIGNIFICANT CHANGE UP (ref 0–0)
PHOSPHATE SERPL-MCNC: 2.1 MG/DL — SIGNIFICANT CHANGE UP (ref 2.1–4.9)
PLATELET # BLD AUTO: 213 K/UL — SIGNIFICANT CHANGE UP (ref 130–400)
PMV BLD: 10.7 FL — HIGH (ref 7.4–10.4)
POTASSIUM SERPL-MCNC: 3.9 MMOL/L — SIGNIFICANT CHANGE UP (ref 3.5–5)
POTASSIUM SERPL-SCNC: 3.9 MMOL/L — SIGNIFICANT CHANGE UP (ref 3.5–5)
PROT SERPL-MCNC: 6.1 G/DL — SIGNIFICANT CHANGE UP (ref 6–8)
RBC # BLD: 3.03 M/UL — LOW (ref 4.7–6.1)
RBC # FLD: 17.4 % — HIGH (ref 11.5–14.5)
SODIUM SERPL-SCNC: 137 MMOL/L — SIGNIFICANT CHANGE UP (ref 135–146)
WBC # BLD: 4.13 K/UL — LOW (ref 4.8–10.8)
WBC # FLD AUTO: 4.13 K/UL — LOW (ref 4.8–10.8)

## 2024-10-03 PROCEDURE — 99233 SBSQ HOSP IP/OBS HIGH 50: CPT

## 2024-10-03 RX ORDER — GUAIFENESIN 100 MG/5ML
100 SOLUTION ORAL ONCE
Refills: 0 | Status: COMPLETED | OUTPATIENT
Start: 2024-10-03 | End: 2024-10-03

## 2024-10-03 RX ADMIN — Medication 5 MILLIGRAM(S): at 14:05

## 2024-10-03 RX ADMIN — Medication 1000 MICROGRAM(S): at 12:22

## 2024-10-03 RX ADMIN — Medication 3 UNIT(S): at 17:44

## 2024-10-03 RX ADMIN — CHLORHEXIDINE GLUCONATE ORAL RINSE 1 APPLICATION(S): 1.2 SOLUTION DENTAL at 05:43

## 2024-10-03 RX ADMIN — GUAIFENESIN 100 MILLIGRAM(S): 100 SOLUTION ORAL at 03:12

## 2024-10-03 RX ADMIN — Medication 0.4 MILLIGRAM(S): at 22:34

## 2024-10-03 RX ADMIN — Medication 60 MILLIGRAM(S): at 05:43

## 2024-10-03 RX ADMIN — PANTOPRAZOLE SODIUM 40 MILLIGRAM(S): 40 TABLET, DELAYED RELEASE ORAL at 05:43

## 2024-10-03 RX ADMIN — Medication 5 MILLIGRAM(S): at 05:43

## 2024-10-03 RX ADMIN — Medication 5000 UNIT(S): at 05:42

## 2024-10-03 RX ADMIN — FOLIC ACID 1 MILLIGRAM(S): 1 TABLET ORAL at 12:22

## 2024-10-03 RX ADMIN — Medication 75 MILLIGRAM(S): at 12:22

## 2024-10-03 RX ADMIN — Medication 5 MILLIGRAM(S): at 22:34

## 2024-10-03 RX ADMIN — Medication 5000 UNIT(S): at 14:10

## 2024-10-03 RX ADMIN — Medication 5000 UNIT(S): at 22:34

## 2024-10-03 RX ADMIN — Medication 25 MILLIGRAM(S): at 05:43

## 2024-10-03 RX ADMIN — ATORVASTATIN CALCIUM 40 MILLIGRAM(S): 10 TABLET, FILM COATED ORAL at 22:34

## 2024-10-03 RX ADMIN — Medication 81 MILLIGRAM(S): at 12:23

## 2024-10-03 NOTE — PROGRESS NOTE ADULT - ASSESSMENT
64-year-old man with a past medical history of ESRD on hemodialysis MWF (last dialysis on 9/20/24), NIDDM, BPH,DM, CAD s/p  CABG, HTN, CHF, TIA, PAD, left toe ulcer s/p amputation sent in by PCP (Dr. Olivier) for increasing abdomen distention over the last 3 weeks.  HD today    3h opti 160 UF 2l    last phos noted, no binders  please repeat labs    follow bp readings if on the low side , decrease nifedipine to 30   s/p paracentesis with 4 liters fluid removal 9/25 followed by hepatology   GI f/up pending    last h/h noted  on RAMSEY  / ferritin on the high side/ sat pending   will follow

## 2024-10-03 NOTE — PROGRESS NOTE ADULT - SUBJECTIVE AND OBJECTIVE BOX
seen and examined  24 h events noted   no distress       PAST HISTORY  --------------------------------------------------------------------------------  No significant changes to PMH, PSH, FHx, SHx, unless otherwise noted    ALLERGIES & MEDICATIONS  --------------------------------------------------------------------------------  Allergies    No Known Allergies    Intolerances      Standing Inpatient Medications  aspirin  chewable 81 milliGRAM(s) Oral daily  atorvastatin 40 milliGRAM(s) Oral at bedtime  chlorhexidine 2% Cloths 1 Application(s) Topical <User Schedule>  clopidogrel Tablet 75 milliGRAM(s) Oral daily  cyanocobalamin 1000 MICROGram(s) Oral daily  darbepoetin Injectable Syringe 50 MICROGram(s) IV Push <User Schedule>  dextrose 5%. 1000 milliLiter(s) IV Continuous <Continuous>  dextrose 5%. 1000 milliLiter(s) IV Continuous <Continuous>  dextrose 50% Injectable 25 Gram(s) IV Push once  dextrose 50% Injectable 12.5 Gram(s) IV Push once  dextrose 50% Injectable 25 Gram(s) IV Push once  folic acid 1 milliGRAM(s) Oral daily  glucagon  Injectable 1 milliGRAM(s) IntraMuscular once  heparin   Injectable 5000 Unit(s) SubCutaneous every 8 hours  influenza  Vaccine (HIGH DOSE) 0.5 milliLiter(s) IntraMuscular once  insulin lispro Injectable (ADMELOG) 3 Unit(s) SubCutaneous before breakfast  insulin lispro Injectable (ADMELOG) 3 Unit(s) SubCutaneous before dinner  metoclopramide 5 milliGRAM(s) Oral three times a day  metoprolol succinate ER 25 milliGRAM(s) Oral daily  NIFEdipine XL 60 milliGRAM(s) Oral daily  pantoprazole    Tablet 40 milliGRAM(s) Oral before breakfast  senna 2 Tablet(s) Oral at bedtime  tamsulosin 0.4 milliGRAM(s) Oral at bedtime    PRN Inpatient Medications  acetaminophen     Tablet .. 650 milliGRAM(s) Oral every 6 hours PRN  artificial  tears Solution 1 Drop(s) Both EYES every 2 hours PRN  dextrose Oral Gel 15 Gram(s) Oral once PRN  ondansetron Injectable 4 milliGRAM(s) IV Push every 6 hours PRN        VITALS/PHYSICAL EXAM  --------------------------------------------------------------------------------  T(C): 36.4 (10-03-24 @ 00:08), Max: 36.4 (10-02-24 @ 15:46)  HR: 72 (10-03-24 @ 05:40) (69 - 73)  BP: 125/52 (10-03-24 @ 05:40) (124/63 - 139/63)  RR: 18 (10-03-24 @ 00:08) (18 - 18)  SpO2: 96% (10-03-24 @ 00:08) (96% - 96%)  Wt(kg): --        10-02-24 @ 07:01  -  10-03-24 @ 07:00  --------------------------------------------------------  IN: 60 mL / OUT: 0 mL / NET: 60 mL      Physical Exam:  	Gen: NAD  	Pulm: CTA B/L  	CV:  S1S2; no rub  	Abd: +distended  	LE: dressing  	Vascular access:av    LABS/STUDIES  --------------------------------------------------------------------------------          Creatinine Trend:  SCr 5.3 [09-30 @ 06:28]  SCr 4.4 [09-29 @ 06:20]  SCr 5.4 [09-28 @ 07:58]  SCr 4.3 [09-27 @ 06:53]  SCr 5.3 [09-26 @ 07:04]    Urinalysis - [09-30-24 @ 06:28]      Color  / Appearance  / SG  / pH       Gluc 124 / Ketone   / Bili  / Urobili        Blood  / Protein  / Leuk Est  / Nitrite       RBC  / WBC  / Hyaline  / Gran  / Sq Epi  / Non Sq Epi  / Bacteria       Iron 93, TIBC --, %sat --      [10-01-24 @ 07:00]  Ferritin 1129      [10-01-24 @ 07:00]  PTH -- (Ca 7.8)      [09-24-24 @ 06:56]   135  Vitamin D (25OH) 15      [09-25-24 @ 06:49]  HbA1c 5.8      [01-30-20 @ 06:46]

## 2024-10-03 NOTE — PROGRESS NOTE ADULT - SUBJECTIVE AND OBJECTIVE BOX
Gastroenterology progress note:     Patient is a 64y old  Male who presents with a chief complaint of nausea,vomiting (03 Oct 2024 11:43)       Admitted on: 09-23-24    We are following the patient for: gastroparesis       Interval History:    Gi recalled given positive gastric emptying study. Patient is currently tolerating diet, but will sometimes regurgitate oral intake, early satiety, Denies abdominal pain. Last BM 2 days ago.   PAST MEDICAL & SURGICAL HISTORY:  S/P CABG (coronary artery bypass graft)  x4      Diabetes mellitus      Transient ischemic attack (TIA)  2017; 2008      Chronic kidney disease (CKD)  Stage IV      Hypertension      Stented coronary artery  in 2008      Myocardial infarction  2012      PAD (peripheral artery disease)  S/p bypass left leg      HLD (hyperlipidemia)      BPH (benign prostatic hyperplasia)      Pain in left knee  s/p fall      OA (osteoarthritis)      [place] (hard of hearing)      Chronic anemia      History of medical problems  left arm precaution      S/P CABG (coronary artery bypass graft)  2012      H/O arterial bypass of lower limb  Left Lower Extremity (2016)      History of surgery  Left CEA (2017)  Left Pinkie toe Amputation (2014)  CABG x 4 (2012)  Card cath - stent (2008)        AV fistula  2019  LEFT AV FISTULA          MEDICATIONS  (STANDING):  aspirin  chewable 81 milliGRAM(s) Oral daily  atorvastatin 40 milliGRAM(s) Oral at bedtime  chlorhexidine 2% Cloths 1 Application(s) Topical <User Schedule>  clopidogrel Tablet 75 milliGRAM(s) Oral daily  cyanocobalamin 1000 MICROGram(s) Oral daily  darbepoetin Injectable Syringe 50 MICROGram(s) IV Push <User Schedule>  dextrose 5%. 1000 milliLiter(s) (50 mL/Hr) IV Continuous <Continuous>  dextrose 5%. 1000 milliLiter(s) (100 mL/Hr) IV Continuous <Continuous>  dextrose 50% Injectable 25 Gram(s) IV Push once  dextrose 50% Injectable 12.5 Gram(s) IV Push once  dextrose 50% Injectable 25 Gram(s) IV Push once  folic acid 1 milliGRAM(s) Oral daily  glucagon  Injectable 1 milliGRAM(s) IntraMuscular once  heparin   Injectable 5000 Unit(s) SubCutaneous every 8 hours  influenza  Vaccine (HIGH DOSE) 0.5 milliLiter(s) IntraMuscular once  insulin lispro Injectable (ADMELOG) 3 Unit(s) SubCutaneous before dinner  insulin lispro Injectable (ADMELOG) 3 Unit(s) SubCutaneous before breakfast  metoclopramide 5 milliGRAM(s) Oral three times a day  metoprolol succinate ER 25 milliGRAM(s) Oral daily  NIFEdipine XL 30 milliGRAM(s) Oral daily  pantoprazole    Tablet 40 milliGRAM(s) Oral before breakfast  senna 2 Tablet(s) Oral at bedtime  tamsulosin 0.4 milliGRAM(s) Oral at bedtime    MEDICATIONS  (PRN):  acetaminophen     Tablet .. 650 milliGRAM(s) Oral every 6 hours PRN Mild Pain (1 - 3)  artificial  tears Solution 1 Drop(s) Both EYES every 2 hours PRN Dry Eyes  dextrose Oral Gel 15 Gram(s) Oral once PRN Blood Glucose LESS THAN 70 milliGRAM(s)/deciliter  ondansetron Injectable 4 milliGRAM(s) IV Push every 6 hours PRN Nausea and/or Vomiting      Allergies  No Known Allergies      Review of Systems:   Cardiovascular:  No Chest Pain, No Palpitations  Respiratory:  No Cough, No Dyspnea  Gastrointestinal:  As described in HPI  Skin:  No Skin Lesions, No Jaundice  Neuro:  No Syncope, No Dizziness    Physical Examination:  T(C): 36.3 (10-03-24 @ 07:15), Max: 36.4 (10-02-24 @ 15:46)  HR: 69 (10-03-24 @ 11:30) (68 - 74)  BP: 110/54 (10-03-24 @ 11:30) (110/54 - 139/63)  RR: 18 (10-03-24 @ 11:30) (18 - 18)  SpO2: 93% (10-03-24 @ 07:15) (93% - 96%)      10-02-24 @ 07:01  -  10-03-24 @ 07:00  --------------------------------------------------------  IN: 60 mL / OUT: 0 mL / NET: 60 mL    10-03-24 @ 07:01  -  10-03-24 @ 13:48  --------------------------------------------------------  IN: 0 mL / OUT: 1500 mL / NET: -1500 mL        GENERAL: AAOx3, no acute distress.  HEAD:  Atraumatic, Normocephalic  EYES: conjunctiva and sclera clear  NECK: Supple, no JVD or thyromegaly  CHEST/LUNG: Clear to auscultation bilaterally; No wheeze, rhonchi, or rales  HEART: Regular rate and rhythm; normal S1, S2, No murmurs.  ABDOMEN: Soft, nontender, nondistended; Bowel sounds present  NEUROLOGY: No asterixis or tremor.   SKIN: Intact, no jaundice     Data:    Hgb trend:              Liver panel trend:  TBili 0.4   /   AST 5   /   ALT <5   /   AlkP 147   /   Tptn 6.2   /   Alb 3.0    /   DBili -- 09-30  TBili 0.4   /   AST 6   /   ALT <5   /   AlkP 150   /   Tptn 6.1   /   Alb 3.0    /   DBili -- 09-29  TBili 0.4   /   AST <5   /   ALT <5   /   AlkP 143   /   Tptn 6.1   /   Alb 3.1    /   DBili -- 09-28  TBili 0.4   /   AST 6   /   ALT <5   /   AlkP 168   /   Tptn 6.3   /   Alb 2.8    /   DBili -- 09-27  TBili 0.4   /   AST 6   /   ALT <5   /   AlkP 151   /   Tptn 6.2   /   Alb 2.6    /   DBili -- 09-26  TBili 0.4   /   AST 6   /   ALT <5   /   AlkP 153   /   Tptn 6.7   /   Alb 2.9    /   DBili -- 09-25  TBili 0.5   /   AST 6   /   ALT <5   /   AlkP 152   /   Tptn 6.5   /   Alb 2.8    /   DBili -- 09-24  TBili 0.6   /   AST 6   /   ALT <5   /   AlkP 167   /   Tptn 7.1   /   Alb 3.1    /   DBili -- 09-23             Radiology:

## 2024-10-03 NOTE — PROGRESS NOTE ADULT - ASSESSMENT
Case of a 64 year old male patient known to have ESRD on HD, NIDDM, CAD s/p CABG, HFpEF, HTN, TIA, PAD s/p left toe ulcer requiring amputation, BPH, and large ascites since 03/2024 who was sent to  ED by PCP on 09/23 for worsening abdominal distention, found to have stable large ascites. Status post HD session 09/24. We are consulted in setting of large ascites.    #Gastroparesis in setting of large volume ascites, well controlled DM, constipation  - Abnormally delayed emptying of the radiolabeled standardized solid material from the stomach over time with 76% retention at 4 hours post feeding (normal <10%) and 80% at 2 hours post feeding (normal range 30-60%)  - Last CTAP9/16: large volume abdominopelvic ascites  - KUB 10/2: Nonspecific air distended loops of bowel with residual oral contrast present  - Patient endorsing emesis and abdominal discomfort after oral intake, improved when attempts small meals and eats slowly   - A1c 6.6  - Chronic constipation not on bowel regimen     Rec   Large volume ascites likely affecting gastric emptying  Recommend repeat abdominal US for evaluation of repeat paracentesis   Can start reglan 5mg TID   Small frequent meals, Separate solids and liquids by 30 mins  Optimize DM and blood sugar control  Daily PPI for prophylaxis  Standing bowel regimen Miralax qd and senna qhs, monitor and document BMs   Ambulate as tolerated   Avoid Anticholingerics, opioids and Calcium channel blockers  Would benefit from outpatient EGD. Can follow up with private GI or at our [facility]   [facility] located at [address] [place] [address], [facility]. 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  #Chronic Isolated ALP Elevation since at least 2022  No evidence of cirrhosis on imaging  Asymptomatic Cholelithiasis  * Incidentally found to have large ascites on CT 03/2024 during trauma workup post fall  * Takes PO lasix 40mg QD at home for CHF/ESRD  * No previous paracentesis  * No history of cirrhosis; drinks wine/gin every few months; last one was years ago  * Hemodynamically stable  * Labs: WBC 4.93, Hb 9.3, MCV 83.9, Plt 191, Na 135, K 4.1, BUN 31, Cr 5.1 09/23  * LFTs 0.6/167/6/5 on 09/23 (previous LFT 2022 0.3/178/27/31 -> 03/2024 0.3/224/20/14 -> 0.6/152/12/5 on 09/16/2024); albumin 3.1   * INR 1.21 on 09/16   * Total iron 39, S% 29%, TIBC 133, ferritin 1126 in 10/2022  * Acute hepatitis panel 2022 unremarkable  * Recent CT abdomen pelvis PO IC 09/16 normal liver, spleen, pancreas, cholelithiasis, mod-large right inguinal hernia, no significant change in large ascites; 3.6x2.8cm hematoma along lateral aspect of right gluteus  * No previous EGD; had colonoscopy with Dr Montenegro 2 years ago  * No FHx of liver diseases or GI cancers  * Status post paracentesis 09/25: 4L removed; SAAG <1.1; fluid protein 5.3; no SBP    RECOMMENDATIONS  - Trend LFTs  - For ascites: Status post paracentesis 09/25: 4L removed; SAAG <1.1; fluid protein 5.3; no SBP. Add ADA and TB PCR to fluid studies. Might benefit from retap prior to discharge as abdomen is still distended after repeat HD session on 09/26  - Nephrology team on board for ESRD management: s/p HD session on 09/24  - Serial abdominal exams  - Monitor pain: on tylenol PRN  - TTE noted: EF improved to 58% with normal RV function. Continue home dose of PO lasix 40mg QD. Discontinued aldactone 25mg QD on 09/24  - Monitor electrolytes and replete as indicated  - Avoid hepatotoxic agents    Chronic Anemia: ACD and Iron deficiency anemia  No overt GI bleeding  * Hb 8.9 on 09/16 -> 9.3 on 09/23; MCV 83.9 (previous Hb 7-8 in 03/2024)  * Total Fe 34, TIIBC 133, S% 29, ferritin 1126 in 10/2022  * INR 1.21 on 09/16  * No melena or hematochezia  * No previous EGD; had colonoscopy with Dr Montenegro 2 years ago  * No FHx of liver diseases or GI cancers    RECOMMENDATIONS  - Trend CBC and keep active T/S  - Nephrology team on board: on HD  - Continue folic acid and cyanocobalamin supplements  - Outpatient follow up with Dr Aboujawdeh to ensure patient is uptodate with scopes

## 2024-10-03 NOTE — PROGRESS NOTE ADULT - ASSESSMENT
64 year old man from SNF with a past medical history of ESRD on hemodialysis [facility] (last dialysis on 9/20/24), NIDDM, BPH, DM, CAD s/p  CABG, HTN, CHF, TIA, PAD, left toe ulcer s/p amputation sent in by PCP (Dr. Olivier) for increasing abdomen distention over the last 3 weeks. Patient with increase abdominal girth despite weight loss. Patient's weight done 20 lbs in the last month    Nausea, Vomiting resolved  Ascites : Likely Nephrogenic Ascites, No evidence of Cirrhosis  - GI / Hepatology evaluation noted  - paracentesis done with removal of 4 liters 9/25, and 6 liters on 9/27  - no evidence of SBP  - positive Abnormality : delayed emptying : gastroparesis    ESRD  - continue HMD  - weight continues trending down  - leg edema improved  - need weight    CAD, post CABG, CHF  on rx  - await 2d echo results    DM type 2 with neurologic, ophth, vascular, and renal manifestions  PAD, hx of toe amputation  retinopathy  gastroparesis, poly neuropathy  - podiatry follows as outpatient      GI prophylaxis   DVT Heparin  Diet resumed    Patient remains acute. Continue to monitor weight. Reviewed SNF chart, weight down from 226 lbs last month.  D/C planning back to [facility] when stable?

## 2024-10-03 NOTE — PROGRESS NOTE ADULT - SUBJECTIVE AND OBJECTIVE BOX
SCHWABACHERSIMON  64y  Male  HPI:  Pt is a 64-year-old man with a past medical history of ESRD on hemodialysis MWF (last dialysis on 9/20/24), NIDDM, BPH,DM, CAD s/p  CABG, HTN, CHF, TIA, PAD, left toe ulcer s/p amputation sent in by PCP (Dr. Olivier) for increasing abdomen distention over the last 3 weeks. Patient reported that he developed new onset abdominal distention over the past 3 weeks that is now causing him abdominal pain and difficulty having PO intake with vomiting. He reported that he had to vomit small amounts of food if he eats. He also reported being constipated over the past 2 days. He denied any hematemesis, sob, chest pain, orthopnea, blood in stools, swelling of the legs, fever or chills, He is aneuric and does not produce urine. He denied any history of smoking or excessive alcohol use. Patient presented to the ED on 9/16 and underwent a C, he was discharged on the same day without further work-up. Patient is currently in [facility]    In the ED:   T: 36.5      HR: 68    BP: 122/61    Spo2: 95% on room air    CT abdomen/pelvis from 9/16/24:   1. Since March 28, 2024, no significant change in large volume abdominopelvic ascites..2. Moderate to large right inguinal hernia contains ascites fluid and a   short segment of nonobstructed cecum and distal ileum.3. Unchanged small left and trace right pleural effusions with small   partially imaged bibasilar opacities.4. A 3.6 cm hyperattenuating focus within the lateral aspect of the right gluteus muscle is suspicious for small hematoma; correlation with clinical history and exam is suggested.           (23 Sep 2024 17:34)    MEDICATIONS  (STANDING):  aspirin  chewable 81 milliGRAM(s) Oral daily  atorvastatin 40 milliGRAM(s) Oral at bedtime  chlorhexidine 2% Cloths 1 Application(s) Topical <User Schedule>  clopidogrel Tablet 75 milliGRAM(s) Oral daily  cyanocobalamin 1000 MICROGram(s) Oral daily  darbepoetin Injectable Syringe 50 MICROGram(s) IV Push <User Schedule>  dextrose 5%. 1000 milliLiter(s) (50 mL/Hr) IV Continuous <Continuous>  dextrose 5%. 1000 milliLiter(s) (100 mL/Hr) IV Continuous <Continuous>  dextrose 50% Injectable 25 Gram(s) IV Push once  dextrose 50% Injectable 25 Gram(s) IV Push once  dextrose 50% Injectable 12.5 Gram(s) IV Push once  folic acid 1 milliGRAM(s) Oral daily  glucagon  Injectable 1 milliGRAM(s) IntraMuscular once  heparin   Injectable 5000 Unit(s) SubCutaneous every 8 hours  influenza  Vaccine (HIGH DOSE) 0.5 milliLiter(s) IntraMuscular once  insulin lispro Injectable (ADMELOG) 3 Unit(s) SubCutaneous before breakfast  insulin lispro Injectable (ADMELOG) 3 Unit(s) SubCutaneous before dinner  metoclopramide 5 milliGRAM(s) Oral three times a day  metoprolol succinate ER 25 milliGRAM(s) Oral daily  NIFEdipine XL 30 milliGRAM(s) Oral daily  pantoprazole    Tablet 40 milliGRAM(s) Oral before breakfast  senna 2 Tablet(s) Oral at bedtime  tamsulosin 0.4 milliGRAM(s) Oral at bedtime    MEDICATIONS  (PRN):  acetaminophen     Tablet .. 650 milliGRAM(s) Oral every 6 hours PRN Mild Pain (1 - 3)  artificial  tears Solution 1 Drop(s) Both EYES every 2 hours PRN Dry Eyes  dextrose Oral Gel 15 Gram(s) Oral once PRN Blood Glucose LESS THAN 70 milliGRAM(s)/deciliter  ondansetron Injectable 4 milliGRAM(s) IV Push every 6 hours PRN Nausea and/or Vomiting    INTERVAL EVENTS: Patient seen today without distress on HMD. Patient denies nausea.  Patient appears less edematous.    T(C): 36.6 (10-03-24 @ 15:51), Max: 36.6 (10-03-24 @ 15:51)  HR: 75 (10-03-24 @ 15:51) (68 - 75)  BP: 127/67 (10-03-24 @ 15:51) (110/54 - 139/63)  RR: 18 (10-03-24 @ 15:51) (18 - 18)  SpO2: 93% (10-03-24 @ 07:15) (93% - 96%)  Wt(kg): --Vital Signs Last 24 Hrs  T(C): 36.6 (03 Oct 2024 15:51), Max: 36.6 (03 Oct 2024 15:51)  T(F): 97.8 (03 Oct 2024 15:51), Max: 97.8 (03 Oct 2024 15:51)  HR: 75 (03 Oct 2024 15:51) (68 - 75)  BP: 127/67 (03 Oct 2024 15:51) (110/54 - 139/63)  BP(mean): 76 (03 Oct 2024 05:40) (76 - 76)  RR: 18 (03 Oct 2024 15:51) (18 - 18)  SpO2: 93% (03 Oct 2024 07:15) (93% - 96%)    Parameters below as of 03 Oct 2024 15:51  Patient On (Oxygen Delivery Method): room air        PHYSICAL EXAM:  GENERAL: NAD  NECK: Supple, No JVD  CHEST/LUNG: Shallow resp  HEART: S1, S2, Regular  ABDOMEN: Soft, Nontender, Less distended, Bowel sounds present  EXTREMITIES: Decreased edema  SKIN: No rashes or lesions    LABS:                        8.0    4.13  )-----------( 213      ( 03 Oct 2024 16:44 )             25.3             10-03    137  |  96[L]  |  20  ----------------------------<  135[H]  3.9   |  31  |  3.7[H]    Ca    7.8[L]      03 Oct 2024 16:44  Phos  2.1     10-03  Mg     1.8     10-03    TPro  6.1  /  Alb  x   /  TBili  0.5  /  DBili  x   /  AST  5   /  ALT  <5  /  AlkPhos  177[H]  10-03    LIVER FUNCTIONS - ( 03 Oct 2024 16:44 )  Alb: x     / Pro: 6.1 g/dL / ALK PHOS: 177 U/L / ALT: <5 U/L / AST: 5 U/L / GGT: x                         Urinalysis Basic - ( 03 Oct 2024 16:44 )    Color: x / Appearance: x / SG: x / pH: x  Gluc: 135 mg/dL / Ketone: x  / Bili: x / Urobili: x   Blood: x / Protein: x / Nitrite: x   Leuk Esterase: x / RBC: x / WBC x   Sq Epi: x / Non Sq Epi: x / Bacteria: x        RADIOLOGY & ADDITIONAL TESTS:

## 2024-10-03 NOTE — PROGRESS NOTE ADULT - ASSESSMENT
64-year-old man with a past medical history of ESRD on hemodialysis MWF (last dialysis on 9/20/24), NIDDM, BPH,DM, CAD s/p  CABG, HTN, CHF, TIA, PAD, left toe ulcer s/p amputation sent in by PCP (Dr. Olivier) for increasing abdomen distention over the last 3 weeks.        #ESRD on HD MWF  #HFmrEF-Last TTE in 2022  - Patient cannot have po intake without vomiting sips of food. Lying comfortably in bed. Vitals WNL. PE showed distended abdomen with clear lungs  -In the ED:   T: 36.5      HR: 68    BP: 122/61    Spo2: 95% on room air  -Alk Phos: 167  -CT abdomen/pelvis from 9/16/24:   1. Since March 28, 2024, no significant change in large volume abdominopelvic ascites..2. Moderate to large right inguinal hernia contains ascites fluid and a   short segment of nonobstructed cecum and distal ileum.3. Unchanged small left and trace right pleural effusions with small   partially imaged bibasilar opacities  - TTE from 2022:  Left ventricular ejection fraction, by visual estimation, is 45 to 50%. Grade III diastolic dysfunction with elevated left atrial and left ventricular end-diastolic pressures. Moderate mitral regurgitation  - Patient on home PO lasix 40mg, aldactone 25 mg  -  Paracentesis *2, pt filled up rapidly: would benefit from increasing dose of lasix/aldactone if BP permits  -  f/u ascites lab work-up: noted, no SBP  - f/u Pro-Bnp:   - f/u TTE:  1. Low normal global left ventricular systolic function.   2. Multiple left ventricular regional wall motion abnormalities exist.    3. LV Ejection Fraction by Jose's Method with a biplane EF of 56 %.   4. No evidence of LV apical thrombus.    - Nephrology on board for HD   -  Gastric emptying study noted with marked delay of food from his stomach.  Patient with anorexia today eating very little.  Need GI review and will ask nutrition for input-Dr. Howell  - received paracentesis Multiple so far; fluid quickly reaccumulate's-nontender   -  calorie count  - Nutrition consult pending  - GI follow up pending:     #CAD s/p CABG;BNP HIGH, BUT WAS HIGHER LAST ADMIT  #HTN  #History of TIA  - On home Plavix 75 mg daily, Aspirin 81 mg  - On home metoprolol succinate 25 mg , Nifedipine 60 mg daily    #DM  #left toe ulcer s/p amputation   - On Humalog 3U before and after dinner   - wound care consult placed    #Chronic constipation  - On Miralax and senna 2 tabs at night  - last BM 3 days ago     #BPH  - On flomax 0.4 mg     #Full code  #DVT prophylaxis: Heparin sub q   handoff: nutrition consult,  GI follow up , wound care recs

## 2024-10-03 NOTE — PROGRESS NOTE ADULT - SUBJECTIVE AND OBJECTIVE BOX
24H events:    Patient is a 64y old Male who presents with a chief complaint of Abdominal distension, pain w N/V (02 Oct 2024 11:40)    Primary diagnosis of Abdominal distention      Day 1:  Day 2:  Day 3:     Today is hospital day 10d. This morning patient was seen and examined at bedside, resting comfortably in bed.    No acute or major events overnight.    Code Status:    Family communication:  Contact date:  Name of person contacted:  Relationship to patient:  Communication details:  What matters most:    PAST MEDICAL & SURGICAL HISTORY  S/P CABG (coronary artery bypass graft)  x4    Diabetes mellitus    Transient ischemic attack (TIA)  2017; 2008    Chronic kidney disease (CKD)  Stage IV    Hypertension    Stented coronary artery  in 2008    Myocardial infarction  2012    PAD (peripheral artery disease)  S/p bypass left leg    HLD (hyperlipidemia)    BPH (benign prostatic hyperplasia)    Pain in left knee  s/p fall    OA (osteoarthritis)    [place] (hard of hearing)    Chronic anemia    History of medical problems  left arm precaution    S/P CABG (coronary artery bypass graft)  2012    H/O arterial bypass of lower limb  Left Lower Extremity (2016)    History of surgery  Left CEA (2017)  Left Pinkie toe Amputation (2014)  CABG x 4 (2012)  Card cath - stent (2008)      AV fistula  2019  LEFT AV FISTULA      SOCIAL HISTORY:  Social History:      ALLERGIES:  No Known Allergies    MEDICATIONS:  STANDING MEDICATIONS  aspirin  chewable 81 milliGRAM(s) Oral daily  atorvastatin 40 milliGRAM(s) Oral at bedtime  chlorhexidine 2% Cloths 1 Application(s) Topical <User Schedule>  clopidogrel Tablet 75 milliGRAM(s) Oral daily  cyanocobalamin 1000 MICROGram(s) Oral daily  darbepoetin Injectable Syringe 50 MICROGram(s) IV Push <User Schedule>  dextrose 5%. 1000 milliLiter(s) IV Continuous <Continuous>  dextrose 5%. 1000 milliLiter(s) IV Continuous <Continuous>  dextrose 50% Injectable 25 Gram(s) IV Push once  dextrose 50% Injectable 12.5 Gram(s) IV Push once  dextrose 50% Injectable 25 Gram(s) IV Push once  folic acid 1 milliGRAM(s) Oral daily  glucagon  Injectable 1 milliGRAM(s) IntraMuscular once  heparin   Injectable 5000 Unit(s) SubCutaneous every 8 hours  influenza  Vaccine (HIGH DOSE) 0.5 milliLiter(s) IntraMuscular once  insulin lispro Injectable (ADMELOG) 3 Unit(s) SubCutaneous before dinner  insulin lispro Injectable (ADMELOG) 3 Unit(s) SubCutaneous before breakfast  metoclopramide 5 milliGRAM(s) Oral three times a day  metoprolol succinate ER 25 milliGRAM(s) Oral daily  NIFEdipine XL 60 milliGRAM(s) Oral daily  pantoprazole    Tablet 40 milliGRAM(s) Oral before breakfast  senna 2 Tablet(s) Oral at bedtime  tamsulosin 0.4 milliGRAM(s) Oral at bedtime    PRN MEDICATIONS  acetaminophen     Tablet .. 650 milliGRAM(s) Oral every 6 hours PRN  artificial  tears Solution 1 Drop(s) Both EYES every 2 hours PRN  dextrose Oral Gel 15 Gram(s) Oral once PRN  ondansetron Injectable 4 milliGRAM(s) IV Push every 6 hours PRN    VITALS:   T(F): 97.3  HR: 68  BP: 110/55  RR: 18  SpO2: 93%    PHYSICAL EXAM:  GENERAL: NAD, lying in bed comfortably  HEAD:  Atraumatic, normocephalic  EYES: EOMI, PERRL  NECK: Supple, trachea midline, no JVD  HEART: Regular rate and rhythm  LUNGS: Unlabored respirations.  Clear to auscultation bilaterally, no crackles, wheezing, or rhonchi  ABDOMEN: soft, distended, BS present  EXTREMITIES: 2+ peripheral pulses bilaterally. No clubbing, cyanosis, or edema  NERVOUS SYSTEM:  A&Ox3, moving all extremities, no focal deficits       (  ) Indwelling Lacey Catheter:   Date insterted:    Reason (  ) Critical illness     (  ) urinary retention    (  ) Accurate Ins/Outs Monitoring     (  ) CMO patient    (  ) Central Line:   Date inserted:  Location: (  ) Right IJ     (  ) Left IJ     (  ) Right Fem     (  ) Left Fem    (  ) SPC        (  ) pigtail       (  ) PEG tube       (  ) colostomy       (  ) jejunostomy  (  ) [place]

## 2024-10-04 LAB
ALBUMIN SERPL ELPH-MCNC: 3 G/DL — LOW (ref 3.5–5.2)
ALP SERPL-CCNC: 174 U/L — HIGH (ref 30–115)
ALT FLD-CCNC: <5 U/L — SIGNIFICANT CHANGE UP (ref 0–41)
ANION GAP SERPL CALC-SCNC: 11 MMOL/L — SIGNIFICANT CHANGE UP (ref 7–14)
AST SERPL-CCNC: 5 U/L — SIGNIFICANT CHANGE UP (ref 0–41)
BASOPHILS # BLD AUTO: 0.07 K/UL — SIGNIFICANT CHANGE UP (ref 0–0.2)
BASOPHILS NFR BLD AUTO: 1.4 % — HIGH (ref 0–1)
BILIRUB SERPL-MCNC: 0.5 MG/DL — SIGNIFICANT CHANGE UP (ref 0.2–1.2)
BUN SERPL-MCNC: 25 MG/DL — HIGH (ref 10–20)
CALCIUM SERPL-MCNC: 7.9 MG/DL — LOW (ref 8.4–10.5)
CHLORIDE SERPL-SCNC: 97 MMOL/L — LOW (ref 98–110)
CO2 SERPL-SCNC: 30 MMOL/L — SIGNIFICANT CHANGE UP (ref 17–32)
CREAT SERPL-MCNC: 4.5 MG/DL — CRITICAL HIGH (ref 0.7–1.5)
EGFR: 14 ML/MIN/1.73M2 — LOW
EOSINOPHIL # BLD AUTO: 0.23 K/UL — SIGNIFICANT CHANGE UP (ref 0–0.7)
EOSINOPHIL NFR BLD AUTO: 4.8 % — SIGNIFICANT CHANGE UP (ref 0–8)
GLUCOSE BLDC GLUCOMTR-MCNC: 168 MG/DL — HIGH (ref 70–99)
GLUCOSE BLDC GLUCOMTR-MCNC: 205 MG/DL — HIGH (ref 70–99)
GLUCOSE BLDC GLUCOMTR-MCNC: 215 MG/DL — HIGH (ref 70–99)
GLUCOSE BLDC GLUCOMTR-MCNC: 216 MG/DL — HIGH (ref 70–99)
GLUCOSE SERPL-MCNC: 130 MG/DL — HIGH (ref 70–99)
HCT VFR BLD CALC: 24.9 % — LOW (ref 42–52)
HGB BLD-MCNC: 7.9 G/DL — LOW (ref 14–18)
IMM GRANULOCYTES NFR BLD AUTO: 0.4 % — HIGH (ref 0.1–0.3)
LYMPHOCYTES # BLD AUTO: 0.57 K/UL — LOW (ref 1.2–3.4)
LYMPHOCYTES # BLD AUTO: 11.8 % — LOW (ref 20.5–51.1)
MAGNESIUM SERPL-MCNC: 1.9 MG/DL — SIGNIFICANT CHANGE UP (ref 1.8–2.4)
MCHC RBC-ENTMCNC: 26.8 PG — LOW (ref 27–31)
MCHC RBC-ENTMCNC: 31.7 G/DL — LOW (ref 32–37)
MCV RBC AUTO: 84.4 FL — SIGNIFICANT CHANGE UP (ref 80–94)
MONOCYTES # BLD AUTO: 0.56 K/UL — SIGNIFICANT CHANGE UP (ref 0.1–0.6)
MONOCYTES NFR BLD AUTO: 11.6 % — HIGH (ref 1.7–9.3)
NEUTROPHILS # BLD AUTO: 3.38 K/UL — SIGNIFICANT CHANGE UP (ref 1.4–6.5)
NEUTROPHILS NFR BLD AUTO: 70 % — SIGNIFICANT CHANGE UP (ref 42.2–75.2)
NRBC # BLD: 0 /100 WBCS — SIGNIFICANT CHANGE UP (ref 0–0)
PLATELET # BLD AUTO: 226 K/UL — SIGNIFICANT CHANGE UP (ref 130–400)
PMV BLD: 10.5 FL — HIGH (ref 7.4–10.4)
POTASSIUM SERPL-MCNC: 3.9 MMOL/L — SIGNIFICANT CHANGE UP (ref 3.5–5)
POTASSIUM SERPL-SCNC: 3.9 MMOL/L — SIGNIFICANT CHANGE UP (ref 3.5–5)
PROT SERPL-MCNC: 6.2 G/DL — SIGNIFICANT CHANGE UP (ref 6–8)
RBC # BLD: 2.95 M/UL — LOW (ref 4.7–6.1)
RBC # FLD: 17.5 % — HIGH (ref 11.5–14.5)
SODIUM SERPL-SCNC: 138 MMOL/L — SIGNIFICANT CHANGE UP (ref 135–146)
WBC # BLD: 4.83 K/UL — SIGNIFICANT CHANGE UP (ref 4.8–10.8)
WBC # FLD AUTO: 4.83 K/UL — SIGNIFICANT CHANGE UP (ref 4.8–10.8)

## 2024-10-04 PROCEDURE — 49083 ABD PARACENTESIS W/IMAGING: CPT

## 2024-10-04 PROCEDURE — 99233 SBSQ HOSP IP/OBS HIGH 50: CPT

## 2024-10-04 RX ADMIN — FOLIC ACID 1 MILLIGRAM(S): 1 TABLET ORAL at 12:12

## 2024-10-04 RX ADMIN — ATORVASTATIN CALCIUM 40 MILLIGRAM(S): 10 TABLET, FILM COATED ORAL at 21:49

## 2024-10-04 RX ADMIN — Medication 30 MILLIGRAM(S): at 05:59

## 2024-10-04 RX ADMIN — Medication 81 MILLIGRAM(S): at 12:12

## 2024-10-04 RX ADMIN — Medication 3 UNIT(S): at 08:26

## 2024-10-04 RX ADMIN — CHLORHEXIDINE GLUCONATE ORAL RINSE 1 APPLICATION(S): 1.2 SOLUTION DENTAL at 06:00

## 2024-10-04 RX ADMIN — PANTOPRAZOLE SODIUM 40 MILLIGRAM(S): 40 TABLET, DELAYED RELEASE ORAL at 05:59

## 2024-10-04 RX ADMIN — Medication 5 MILLIGRAM(S): at 14:26

## 2024-10-04 RX ADMIN — Medication 1000 MICROGRAM(S): at 12:12

## 2024-10-04 RX ADMIN — Medication 5000 UNIT(S): at 05:58

## 2024-10-04 RX ADMIN — Medication 25 MILLIGRAM(S): at 05:59

## 2024-10-04 RX ADMIN — Medication 5 MILLIGRAM(S): at 21:48

## 2024-10-04 RX ADMIN — Medication 0.4 MILLIGRAM(S): at 21:49

## 2024-10-04 RX ADMIN — Medication 5000 UNIT(S): at 14:26

## 2024-10-04 RX ADMIN — Medication 3 UNIT(S): at 17:37

## 2024-10-04 RX ADMIN — Medication 5000 UNIT(S): at 21:48

## 2024-10-04 RX ADMIN — Medication 5 MILLIGRAM(S): at 05:59

## 2024-10-04 RX ADMIN — Medication 75 MILLIGRAM(S): at 12:12

## 2024-10-04 NOTE — PROGRESS NOTE ADULT - SUBJECTIVE AND OBJECTIVE BOX
Nephrology progress note    THIS IS AN INCOMPLETE NOTE . FULL NOTE TO FOLLOW SHORTLY    Patient is seen and examined, events over the last 24 h noted .    Allergies:  No Known Allergies    Hospital Medications:   MEDICATIONS  (STANDING):  aspirin  chewable 81 milliGRAM(s) Oral daily  atorvastatin 40 milliGRAM(s) Oral at bedtime  chlorhexidine 2% Cloths 1 Application(s) Topical <User Schedule>  clopidogrel Tablet 75 milliGRAM(s) Oral daily  cyanocobalamin 1000 MICROGram(s) Oral daily  darbepoetin Injectable Syringe 50 MICROGram(s) IV Push <User Schedule>  dextrose 5%. 1000 milliLiter(s) (50 mL/Hr) IV Continuous <Continuous>  dextrose 5%. 1000 milliLiter(s) (100 mL/Hr) IV Continuous <Continuous>  dextrose 50% Injectable 12.5 Gram(s) IV Push once  dextrose 50% Injectable 25 Gram(s) IV Push once  dextrose 50% Injectable 25 Gram(s) IV Push once  folic acid 1 milliGRAM(s) Oral daily  glucagon  Injectable 1 milliGRAM(s) IntraMuscular once  heparin   Injectable 5000 Unit(s) SubCutaneous every 8 hours  influenza  Vaccine (HIGH DOSE) 0.5 milliLiter(s) IntraMuscular once  insulin lispro Injectable (ADMELOG) 3 Unit(s) SubCutaneous before breakfast  insulin lispro Injectable (ADMELOG) 3 Unit(s) SubCutaneous before dinner  metoclopramide 5 milliGRAM(s) Oral three times a day  metoprolol succinate ER 25 milliGRAM(s) Oral daily  NIFEdipine XL 30 milliGRAM(s) Oral daily  pantoprazole    Tablet 40 milliGRAM(s) Oral before breakfast  senna 2 Tablet(s) Oral at bedtime  tamsulosin 0.4 milliGRAM(s) Oral at bedtime        VITALS:  T(F): 97.4 (10-04-24 @ 07:15), Max: 97.8 (10-03-24 @ 15:51)  HR: 76 (10-04-24 @ 07:15)  BP: 138/66 (10-04-24 @ 07:15)  RR: 18 (10-04-24 @ 07:15)  SpO2: 98% (10-04-24 @ 07:15)  Wt(kg): --    10-02 @ 07:01  -  10-03 @ 07:00  --------------------------------------------------------  IN: 60 mL / OUT: 0 mL / NET: 60 mL    10-03 @ 07:01  -  10-04 @ 07:00  --------------------------------------------------------  IN: 0 mL / OUT: 1500 mL / NET: -1500 mL          PHYSICAL EXAM:  Constitutional: NAD  HEENT: anicteric sclera, oropharynx clear, MMM  Neck: No JVD  Respiratory: CTAB, no wheezes, rales or rhonchi  Cardiovascular: S1, S2, RRR  Gastrointestinal: BS+, soft, NT/ND  Extremities: No cyanosis or clubbing. No peripheral edema  :  No schneider.   Skin: No rashes    LABS:  10-04    138  |  97[L]  |  25[H]  ----------------------------<  130[H]  3.9   |  30  |  4.5[HH]    Ca    7.9[L]      04 Oct 2024 06:43  Phos  2.1     10-03  Mg     1.9     10-04    TPro  6.2  /  Alb  3.0[L]  /  TBili  0.5  /  DBili      /  AST  5   /  ALT  <5  /  AlkPhos  174[H]  10-04                          7.9    4.83  )-----------( 226      ( 04 Oct 2024 06:43 )             24.9       Urine Studies:  Urinalysis Basic - ( 04 Oct 2024 06:43 )    Color:  / Appearance:  / SG:  / pH:   Gluc: 130 mg/dL / Ketone:   / Bili:  / Urobili:    Blood:  / Protein:  / Nitrite:    Leuk Esterase:  / RBC:  / WBC    Sq Epi:  / Non Sq Epi:  / Bacteria:           Iron 93, TIBC --, %sat --      [10-01-24 @ 07:00]  Ferritin 1129      [10-01-24 @ 07:00]  PTH -- (Ca 7.8)      [09-24-24 @ 06:56]   135  Vitamin D (25OH) 15      [09-25-24 @ 06:49]  HbA1c 5.8      [01-30-20 @ 06:46]    HBsAb <3.0      [12-29-19 @ 17:44]  HBsAb Nonreact      [11-03-22 @ 06:40]  HBsAg Nonreact      [11-03-22 @ 06:40]  HBcAb Nonreact      [11-03-22 @ 06:40]  HCV 0.14, Nonreact      [10-27-22 @ 04:09]        RADIOLOGY & ADDITIONAL STUDIES:   Nephrology progress note    Patient is seen and examined, events over the last 24 h noted .  Lying in bed comfortable     Allergies:  No Known Allergies    Hospital Medications:   MEDICATIONS  (STANDING):  aspirin  chewable 81 milliGRAM(s) Oral daily  atorvastatin 40 milliGRAM(s) Oral at bedtime  clopidogrel Tablet 75 milliGRAM(s) Oral daily  cyanocobalamin 1000 MICROGram(s) Oral daily  darbepoetin Injectable Syringe 50 MICROGram(s) IV Push <User Schedule>  folic acid 1 milliGRAM(s) Oral daily  glucagon  Injectable 1 milliGRAM(s) IntraMuscular once  heparin   Injectable 5000 Unit(s) SubCutaneous every 8 hours  influenza  Vaccine (HIGH DOSE) 0.5 milliLiter(s) IntraMuscular once  insulin lispro Injectable (ADMELOG) 3 Unit(s) SubCutaneous before breakfast  insulin lispro Injectable (ADMELOG) 3 Unit(s) SubCutaneous before dinner  metoclopramide 5 milliGRAM(s) Oral three times a day  metoprolol succinate ER 25 milliGRAM(s) Oral daily  NIFEdipine XL 30 milliGRAM(s) Oral daily  pantoprazole    Tablet 40 milliGRAM(s) Oral before breakfast  senna 2 Tablet(s) Oral at bedtime  tamsulosin 0.4 milliGRAM(s) Oral at bedtime        VITALS:  T(F): 97.4 (10-04-24 @ 07:15), Max: 97.8 (10-03-24 @ 15:51)  HR: 76 (10-04-24 @ 07:15)  BP: 138/66 (10-04-24 @ 07:15)  RR: 18 (10-04-24 @ 07:15)  SpO2: 98% (10-04-24 @ 07:15)      10-02 @ 07:01  -  10-03 @ 07:00  --------------------------------------------------------  IN: 60 mL / OUT: 0 mL / NET: 60 mL    10-03 @ 07:01  -  10-04 @ 07:00  --------------------------------------------------------  IN: 0 mL / OUT: 1500 mL / NET: -1500 mL          PHYSICAL EXAM:  Constitutional: NAD pale   Respiratory: CTAB, no wheezes, rales or rhonchi  Cardiovascular: S1, S2, RRR  Gastrointestinal: BS+, soft, NT/ND  Extremities: No cyanosis or clubbing. No peripheral edema  :  No schneider.   Skin: No rashes    LABS:  10-04    138  |  97[L]  |  25[H]  ----------------------------<  130[H]  3.9   |  30  |  4.5[HH]    Ca    7.9[L]      04 Oct 2024 06:43  Phos  2.1     10-03  Mg     1.9     10-04    TPro  6.2  /  Alb  3.0[L]  /  TBili  0.5  /  DBili      /  AST  5   /  ALT  <5  /  AlkPhos  174[H]  10-04                          7.9    4.83  )-----------( 226      ( 04 Oct 2024 06:43 )             24.9       Urine Studies:  Urinalysis Basic - ( 04 Oct 2024 06:43 )    Color:  / Appearance:  / SG:  / pH:   Gluc: 130 mg/dL / Ketone:   / Bili:  / Urobili:    Blood:  / Protein:  / Nitrite:    Leuk Esterase:  / RBC:  / WBC    Sq Epi:  / Non Sq Epi:  / Bacteria:           Iron 93, TIBC --, %sat --      [10-01-24 @ 07:00]  Ferritin 1129      [10-01-24 @ 07:00]  PTH -- (Ca 7.8)      [09-24-24 @ 06:56]   135  Vitamin D (25OH) 15      [09-25-24 @ 06:49]  HbA1c 5.8      [01-30-20 @ 06:46]    HBsAb <3.0      [12-29-19 @ 17:44]  HBsAb Nonreact      [11-03-22 @ 06:40]  HBsAg Nonreact      [11-03-22 @ 06:40]  HBcAb Nonreact      [11-03-22 @ 06:40]  HCV 0.14, Nonreact      [10-27-22 @ 04:09]        RADIOLOGY & ADDITIONAL STUDIES:

## 2024-10-04 NOTE — PROCEDURE NOTE - NSPOSTCAREGUIDE_GEN_A_CORE
Verbal/written post procedure instructions were given to patient/caregiver/Instructed patient/caregiver to follow-up with primary care physician/Instructed patient/caregiver regarding signs and symptoms of infection/Keep the cast/splint/dressing clean and dry

## 2024-10-04 NOTE — PROCEDURE NOTE - NSPROCDETAILS_GEN_ALL_CORE
location identified, sterile technique used, catheter introduced, fluid drained

## 2024-10-04 NOTE — PROGRESS NOTE ADULT - ASSESSMENT
64 year old man from SNF with a past medical history of ESRD on hemodialysis [facility] (last dialysis on 9/20/24), NIDDM, BPH, DM, CAD s/p  CABG, HTN, CHF, TIA, PAD, left toe ulcer s/p amputation sent in by PCP (Dr. Olivier) for increasing abdomen distention over the last 3 weeks. Patient with increase abdominal girth despite weight loss. 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  - asked nurse to document weight  - leg edema improved  - need daily weight    CAD, post CABG, CHF  on rx  - 2d echo results    DM type 2 with neurologic, ophth, vascular, and renal manifestions  PAD, hx of toe amputation  retinopathy  gastroparesis, poly neuropathy  - podiatry follows as outpatient      GI prophylaxis   DVT Heparin  Diet resumed, encourage small meals    Patient remains acute. Continue to monitor weight. Reviewed SNF chart, weight down from 226 lbs last month.  D/C planning back to [facility] when stable? Financial reasons may hinder discharge

## 2024-10-04 NOTE — PROGRESS NOTE ADULT - ATTENDING COMMENTS
64 y o  M with T2DM CAD s/p CABG, HFpEF, HTN, TIA, V ESRD on HD, PAD s/p left toe ulcer s/p amputation admitted with abdominal distension due to ascites.     No complaints  No icterus  AVF L arm  Abdomen distended non tender not tense flanks dull  No asterixis     Ascites with ESRD on HD and HFpEF due to nephrogenic ascites  No evidence of liver disease  Echo normal EF    Fluid studies low SAAG high protein consistent with nephrogenic ascites  Please send ADA and TB PCR as well on fluid  Limited role for diuretics as oliguric  Optimize dialysis per nephrology  Paracentesis PRN
64 y o  M with T2DM CAD s/p CABG, HFpEF, HTN, TIA, V ESRD on HD, PAD s/p left toe ulcer s/p amputation admitted with abdominal distension due to ascites.     Dialyzed yesterday.  No icterus  AVF L arm  Abdomen distended non tender ascites +  No asterixis     Ascites with ESRD on HD and HFpEF likely to be nephrogenic ascites  No evidence of liver disease  Echo pending    Paracentesis with fluid studies awaited.
I edited the note
I edited the note

## 2024-10-04 NOTE — PROGRESS NOTE ADULT - ASSESSMENT
64-year-old man with a past medical history of ESRD on hemodialysis MWF (last dialysis on 9/20/24), NIDDM, BPH,DM, CAD s/p  CABG, HTN, CHF, TIA, PAD, left toe ulcer s/p amputation sent in by PCP (Dr. Olivier) for increasing abdomen distention over the last 3 weeks.  HD in AM     last phos noted, no binders  please repeat labs    s/p paracentesis with 4 liters fluid removal 9/25 followed by hepatology   GI f/up noted    last h/h noted  on RAMSEY  / ferritin on the high side/ sat pending / transfuse if Hb < 7   will follow

## 2024-10-04 NOTE — PROGRESS NOTE ADULT - SUBJECTIVE AND OBJECTIVE BOX
24H events:    Patient is a 64y old Male who presents with a chief complaint of Abdominal distension, pain w N/V (02 Oct 2024 11:40)    Primary diagnosis of Abdominal distention      Today is hospital day 11d. This morning patient was seen and examined at bedside, resting comfortably in bed.    No acute or major events overnight.    Code Status: full        PAST MEDICAL & SURGICAL HISTORY  S/P CABG (coronary artery bypass graft)  x4    Diabetes mellitus    Transient ischemic attack (TIA)  2017; 2008    Chronic kidney disease (CKD)  Stage IV    Hypertension    Stented coronary artery  in 2008    Myocardial infarction  2012    PAD (peripheral artery disease)  S/p bypass left leg    HLD (hyperlipidemia)    BPH (benign prostatic hyperplasia)    Pain in left knee  s/p fall    OA (osteoarthritis)    [place] (hard of hearing)    Chronic anemia    History of medical problems  left arm precaution    S/P CABG (coronary artery bypass graft)  2012    H/O arterial bypass of lower limb  Left Lower Extremity (2016)    History of surgery  Left CEA (2017)  Left Pinkie toe Amputation (2014)  CABG x 4 (2012)  Card cath - stent (2008)      AV fistula  2019  LEFT AV FISTULA      SOCIAL HISTORY:  Social History:      ALLERGIES:  No Known Allergies    MEDICATIONS:  STANDING MEDICATIONS  aspirin  chewable 81 milliGRAM(s) Oral daily  atorvastatin 40 milliGRAM(s) Oral at bedtime  chlorhexidine 2% Cloths 1 Application(s) Topical <User Schedule>  clopidogrel Tablet 75 milliGRAM(s) Oral daily  cyanocobalamin 1000 MICROGram(s) Oral daily  darbepoetin Injectable Syringe 50 MICROGram(s) IV Push <User Schedule>  dextrose 5%. 1000 milliLiter(s) IV Continuous <Continuous>  dextrose 5%. 1000 milliLiter(s) IV Continuous <Continuous>  dextrose 50% Injectable 25 Gram(s) IV Push once  dextrose 50% Injectable 12.5 Gram(s) IV Push once  dextrose 50% Injectable 25 Gram(s) IV Push once  folic acid 1 milliGRAM(s) Oral daily  glucagon  Injectable 1 milliGRAM(s) IntraMuscular once  heparin   Injectable 5000 Unit(s) SubCutaneous every 8 hours  influenza  Vaccine (HIGH DOSE) 0.5 milliLiter(s) IntraMuscular once  insulin lispro Injectable (ADMELOG) 3 Unit(s) SubCutaneous before dinner  insulin lispro Injectable (ADMELOG) 3 Unit(s) SubCutaneous before breakfast  metoclopramide 5 milliGRAM(s) Oral three times a day  metoprolol succinate ER 25 milliGRAM(s) Oral daily  NIFEdipine XL 60 milliGRAM(s) Oral daily  pantoprazole    Tablet 40 milliGRAM(s) Oral before breakfast  senna 2 Tablet(s) Oral at bedtime  tamsulosin 0.4 milliGRAM(s) Oral at bedtime    PRN MEDICATIONS  acetaminophen     Tablet .. 650 milliGRAM(s) Oral every 6 hours PRN  artificial  tears Solution 1 Drop(s) Both EYES every 2 hours PRN  dextrose Oral Gel 15 Gram(s) Oral once PRN  ondansetron Injectable 4 milliGRAM(s) IV Push every 6 hours PRN    VITALS:   Vital Signs Last 24 Hrs  T(C): 36.3 (04 Oct 2024 07:15), Max: 36.6 (03 Oct 2024 15:51)  T(F): 97.4 (04 Oct 2024 07:15), Max: 97.8 (03 Oct 2024 15:51)  HR: 76 (04 Oct 2024 07:15) (69 - 76)  BP: 138/66 (04 Oct 2024 07:15) (110/54 - 138/66)  BP(mean): 94 (04 Oct 2024 05:46) (94 - 94)  RR: 18 (04 Oct 2024 07:15) (18 - 18)  SpO2: 98% (04 Oct 2024 07:15) (95% - 98%)            PHYSICAL EXAM:  GENERAL: NAD, lying in bed comfortably  HEAD:  Atraumatic, normocephalic  EYES: EOMI, PERRL  NECK: Supple, trachea midline, no JVD  HEART: Regular rate and rhythm  LUNGS: Unlabored respirations.  Clear to auscultation bilaterally, no crackles, wheezing, or rhonchi  ABDOMEN: soft, distended, BS present  EXTREMITIES: 2+ peripheral pulses bilaterally. No clubbing, cyanosis, or edema  NERVOUS SYSTEM:  A&Ox3, moving all extremities, no focal deficits       (  ) Indwelling Lacey Catheter:   Date insterted:    Reason (  ) Critical illness     (  ) urinary retention    (  ) Accurate Ins/Outs Monitoring     (  ) CMO patient    (  ) Central Line:   Date inserted:  Location: (  ) Right IJ     (  ) Left IJ     (  ) Right Fem     (  ) Left Fem    (  ) SPC        (  ) pigtail       (  ) PEG tube       (  ) colostomy       (  ) jejunostomy  (  ) [place]

## 2024-10-04 NOTE — PROCEDURE NOTE - SUPERVISORY STATEMENT
Procedure team was consulted to perform urgent paracentesis for the management of ascites. I was present and supervised the entire procedure. Prior to beginning the procedure, an appropriate fluid pocket was identified Utilizing ultrasound guidance. The representative images are stored on the [employer] application.
Procedure team was consulted to perform paracentesis procedure for the diagnosis of  SBP. I was present for the key critical aspects of the procedure performed during the care of the patient.    Utilizing ultrasound guidance,  a catheter was inserted into the  abdomen. Prior to needle insertion, the patency of the vein was confirmed with ultrasound.
Procedure team was consulted to perform urgent paracentesis for the management of ascites. I was present for the key critical aspects of the procedure performed during the care of the patient. Utilizing ultrasound guidance,  an appropriate pocket of fluid was identified prior to paracentesis catheter insertion.

## 2024-10-04 NOTE — PROGRESS NOTE ADULT - SUBJECTIVE AND OBJECTIVE BOX
Gastroenterology progress note:     Patient is a 64y old  Male who presents with a chief complaint of N/V (04 Oct 2024 10:50)       Admitted on: 09-23-24    We are following the patient for: gastroparesis        Interval History:    No acute events overnight. Tolerating diet. Denies abdominal pain.       PAST MEDICAL & SURGICAL HISTORY:  S/P CABG (coronary artery bypass graft)  x4      Diabetes mellitus      Transient ischemic attack (TIA)  2017; 2008      Chronic kidney disease (CKD)  Stage IV      Hypertension      Stented coronary artery  in 2008      Myocardial infarction  2012      PAD (peripheral artery disease)  S/p bypass left leg      HLD (hyperlipidemia)      BPH (benign prostatic hyperplasia)      Pain in left knee  s/p fall      OA (osteoarthritis)      [place] (hard of hearing)      Chronic anemia      History of medical problems  left arm precaution      S/P CABG (coronary artery bypass graft)  2012      H/O arterial bypass of lower limb  Left Lower Extremity (2016)      History of surgery  Left CEA (2017)  Left Pinkie toe Amputation (2014)  CABG x 4 (2012)  Card cath - stent (2008)        AV fistula  2019  LEFT AV FISTULA          MEDICATIONS  (STANDING):  aspirin  chewable 81 milliGRAM(s) Oral daily  atorvastatin 40 milliGRAM(s) Oral at bedtime  chlorhexidine 2% Cloths 1 Application(s) Topical <User Schedule>  clopidogrel Tablet 75 milliGRAM(s) Oral daily  cyanocobalamin 1000 MICROGram(s) Oral daily  darbepoetin Injectable Syringe 50 MICROGram(s) IV Push <User Schedule>  dextrose 5%. 1000 milliLiter(s) (50 mL/Hr) IV Continuous <Continuous>  dextrose 5%. 1000 milliLiter(s) (100 mL/Hr) IV Continuous <Continuous>  dextrose 50% Injectable 25 Gram(s) IV Push once  dextrose 50% Injectable 12.5 Gram(s) IV Push once  dextrose 50% Injectable 25 Gram(s) IV Push once  folic acid 1 milliGRAM(s) Oral daily  glucagon  Injectable 1 milliGRAM(s) IntraMuscular once  heparin   Injectable 5000 Unit(s) SubCutaneous every 8 hours  influenza  Vaccine (HIGH DOSE) 0.5 milliLiter(s) IntraMuscular once  insulin lispro Injectable (ADMELOG) 3 Unit(s) SubCutaneous before dinner  insulin lispro Injectable (ADMELOG) 3 Unit(s) SubCutaneous before breakfast  metoclopramide 5 milliGRAM(s) Oral three times a day  metoprolol succinate ER 25 milliGRAM(s) Oral daily  NIFEdipine XL 30 milliGRAM(s) Oral daily  pantoprazole    Tablet 40 milliGRAM(s) Oral before breakfast  senna 2 Tablet(s) Oral at bedtime  tamsulosin 0.4 milliGRAM(s) Oral at bedtime    MEDICATIONS  (PRN):  acetaminophen     Tablet .. 650 milliGRAM(s) Oral every 6 hours PRN Mild Pain (1 - 3)  artificial  tears Solution 1 Drop(s) Both EYES every 2 hours PRN Dry Eyes  dextrose Oral Gel 15 Gram(s) Oral once PRN Blood Glucose LESS THAN 70 milliGRAM(s)/deciliter  ondansetron Injectable 4 milliGRAM(s) IV Push every 6 hours PRN Nausea and/or Vomiting      Allergies  No Known Allergies      Review of Systems:   Cardiovascular:  No Chest Pain, No Palpitations  Respiratory:  No Cough, No Dyspnea  Gastrointestinal:  As described in HPI  Skin:  No Skin Lesions, No Jaundice  Neuro:  No Syncope, No Dizziness    Physical Examination:  T(C): 36.4 (10-04-24 @ 15:28), Max: 36.5 (10-04-24 @ 00:00)  HR: 69 (10-04-24 @ 15:28) (69 - 76)  BP: 138/68 (10-04-24 @ 15:28) (120/81 - 138/68)  RR: 18 (10-04-24 @ 15:28) (18 - 18)  SpO2: 97% (10-04-24 @ 15:28) (95% - 98%)      10-03-24 @ 07:01  -  10-04-24 @ 07:00  --------------------------------------------------------  IN: 0 mL / OUT: 1500 mL / NET: -1500 mL        GENERAL: AAOx3, no acute distress.  HEAD:  Atraumatic, Normocephalic  EYES: conjunctiva and sclera clear  NECK: Supple, no JVD or thyromegaly  CHEST/LUNG: Clear to auscultation bilaterally; No wheeze, rhonchi, or rales  HEART: Regular rate and rhythm; normal S1, S2, No murmurs.  ABDOMEN: Soft, nontender, nondistended; Bowel sounds present  NEUROLOGY: No asterixis or tremor.   SKIN: Intact, no jaundice     Data:                        7.9    4.83  )-----------( 226      ( 04 Oct 2024 06:43 )             24.9     Hgb trend:  7.9  10-04-24 @ 06:43  8.0  10-03-24 @ 16:44        10-04    138  |  97[L]  |  25[H]  ----------------------------<  130[H]  3.9   |  30  |  4.5[HH]    Ca    7.9[L]      04 Oct 2024 06:43  Phos  2.1     10-03  Mg     1.9     10-04    TPro  6.2  /  Alb  3.0[L]  /  TBili  0.5  /  DBili  x   /  AST  5   /  ALT  <5  /  AlkPhos  174[H]  10-04    Liver panel trend:  TBili 0.5   /   AST 5   /   ALT <5   /   AlkP 174   /   Tptn 6.2   /   Alb 3.0    /   DBili --      10-04  TBili 0.5   /   AST 5   /   ALT <5   /   AlkP 177   /   Tptn 6.1   /   Alb 3.0    /   DBili --      10-03  TBili 0.4   /   AST 5   /   ALT <5   /   AlkP 147   /   Tptn 6.2   /   Alb 3.0    /   DBili --      09-30  TBili 0.4   /   AST 6   /   ALT <5   /   AlkP 150   /   Tptn 6.1   /   Alb 3.0    /   DBili --      09-29  TBili 0.4   /   AST <5   /   ALT <5   /   AlkP 143   /   Tptn 6.1   /   Alb 3.1    /   DBili --      09-28  TBili 0.4   /   AST 6   /   ALT <5   /   AlkP 168   /   Tptn 6.3   /   Alb 2.8    /   DBili --      09-27  TBili 0.4   /   AST 6   /   ALT <5   /   AlkP 151   /   Tptn 6.2   /   Alb 2.6    /   DBili --      09-26  TBili 0.4   /   AST 6   /   ALT <5   /   AlkP 153   /   Tptn 6.7   /   Alb 2.9    /   DBili --      09-25             Radiology:

## 2024-10-04 NOTE — PROGRESS NOTE ADULT - SUBJECTIVE AND OBJECTIVE BOX
SCHWABACHERSIMON  64y  Male  HPI:  Pt is a 64-year-old man with a past medical history of ESRD on hemodialysis MWF (last dialysis on 9/20/24), NIDDM, BPH,DM, CAD s/p  CABG, HTN, CHF, TIA, PAD, left toe ulcer s/p amputation sent in by PCP (Dr. Olivier) for increasing abdomen distention over the last 3 weeks. Patient reported that he developed new onset abdominal distention over the past 3 weeks that is now causing him abdominal pain and difficulty having PO intake with vomiting. He reported that he had to vomit small amounts of food if he eats. He also reported being constipated over the past 2 days. He denied any hematemesis, sob, chest pain, orthopnea, blood in stools, swelling of the legs, fever or chills, He is aneuric and does not produce urine. He denied any history of smoking or excessive alcohol use. Patient presented to the ED on 9/16 and underwent a C, he was discharged on the same day without further work-up. Patient is currently in [facility]    In the ED:   T: 36.5      HR: 68    BP: 122/61    Spo2: 95% on room air    CT abdomen/pelvis from 9/16/24:   1. Since March 28, 2024, no significant change in large volume abdominopelvic ascites..2. Moderate to large right inguinal hernia contains ascites fluid and a   short segment of nonobstructed cecum and distal ileum.3. Unchanged small left and trace right pleural effusions with small   partially imaged bibasilar opacities.4. A 3.6 cm hyperattenuating focus within the lateral aspect of the right gluteus muscle is suspicious for small hematoma; correlation with clinical history and exam is suggested.           (23 Sep 2024 17:34)    MEDICATIONS  (STANDING):  aspirin  chewable 81 milliGRAM(s) Oral daily  atorvastatin 40 milliGRAM(s) Oral at bedtime  chlorhexidine 2% Cloths 1 Application(s) Topical <User Schedule>  clopidogrel Tablet 75 milliGRAM(s) Oral daily  cyanocobalamin 1000 MICROGram(s) Oral daily  darbepoetin Injectable Syringe 50 MICROGram(s) IV Push <User Schedule>  dextrose 5%. 1000 milliLiter(s) (100 mL/Hr) IV Continuous <Continuous>  dextrose 5%. 1000 milliLiter(s) (50 mL/Hr) IV Continuous <Continuous>  dextrose 50% Injectable 25 Gram(s) IV Push once  dextrose 50% Injectable 12.5 Gram(s) IV Push once  dextrose 50% Injectable 25 Gram(s) IV Push once  folic acid 1 milliGRAM(s) Oral daily  glucagon  Injectable 1 milliGRAM(s) IntraMuscular once  heparin   Injectable 5000 Unit(s) SubCutaneous every 8 hours  influenza  Vaccine (HIGH DOSE) 0.5 milliLiter(s) IntraMuscular once  insulin lispro Injectable (ADMELOG) 3 Unit(s) SubCutaneous before dinner  insulin lispro Injectable (ADMELOG) 3 Unit(s) SubCutaneous before breakfast  metoclopramide 5 milliGRAM(s) Oral three times a day  metoprolol succinate ER 25 milliGRAM(s) Oral daily  NIFEdipine XL 30 milliGRAM(s) Oral daily  pantoprazole    Tablet 40 milliGRAM(s) Oral before breakfast  senna 2 Tablet(s) Oral at bedtime  tamsulosin 0.4 milliGRAM(s) Oral at bedtime    MEDICATIONS  (PRN):  acetaminophen     Tablet .. 650 milliGRAM(s) Oral every 6 hours PRN Mild Pain (1 - 3)  artificial  tears Solution 1 Drop(s) Both EYES every 2 hours PRN Dry Eyes  dextrose Oral Gel 15 Gram(s) Oral once PRN Blood Glucose LESS THAN 70 milliGRAM(s)/deciliter  ondansetron Injectable 4 milliGRAM(s) IV Push every 6 hours PRN Nausea and/or Vomiting    INTERVAL EVENTS: Patient seen today without distress, lying flat, continues to have intermittent nausea.     T(C): 36.3 (10-04-24 @ 07:15), Max: 36.6 (10-03-24 @ 15:51)  HR: 76 (10-04-24 @ 07:15) (69 - 76)  BP: 138/66 (10-04-24 @ 07:15) (110/54 - 138/66)  RR: 18 (10-04-24 @ 07:15) (18 - 18)  SpO2: 98% (10-04-24 @ 07:15) (95% - 98%)  Wt(kg): --Vital Signs Last 24 Hrs  T(C): 36.3 (04 Oct 2024 07:15), Max: 36.6 (03 Oct 2024 15:51)  T(F): 97.4 (04 Oct 2024 07:15), Max: 97.8 (03 Oct 2024 15:51)  HR: 76 (04 Oct 2024 07:15) (69 - 76)  BP: 138/66 (04 Oct 2024 07:15) (110/54 - 138/66)  BP(mean): 94 (04 Oct 2024 05:46) (94 - 94)  RR: 18 (04 Oct 2024 07:15) (18 - 18)  SpO2: 98% (04 Oct 2024 07:15) (95% - 98%)    Parameters below as of 04 Oct 2024 07:15  Patient On (Oxygen Delivery Method): room air        PHYSICAL EXAM:  GENERAL: NAD  NECK: Supple, No JVD  CHEST/LUNG: Clear, Decreased at bases  HEART: S1, S2  ABDOMEN: Soft, Nontender, distended; Bowel sounds present  EXTREMITIES: Decreased edema  SKIN: No rashes or lesions    LABS:  Labs:                        7.9    4.83  )-----------( 226      ( 04 Oct 2024 06:43 )             24.9             10-04    138  |  97[L]  |  25[H]  ----------------------------<  130[H]  3.9   |  30  |  4.5[HH]    Ca    7.9[L]      04 Oct 2024 06:43  Phos  2.1     10-03  Mg     1.9     10-04    TPro  6.2  /  Alb  3.0[L]  /  TBili  0.5  /  DBili  x   /  AST  5   /  ALT  <5  /  AlkPhos  174[H]  10-04    LIVER FUNCTIONS - ( 04 Oct 2024 06:43 )  Alb: 3.0 g/dL / Pro: 6.2 g/dL / ALK PHOS: 174 U/L / ALT: <5 U/L / AST: 5 U/L / GGT: x                         Urinalysis Basic - ( 04 Oct 2024 06:43 )    Color: x / Appearance: x / SG: x / pH: x  Gluc: 130 mg/dL / Ketone: x  / Bili: x / Urobili: x   Blood: x / Protein: x / Nitrite: x   Leuk Esterase: x / RBC: x / WBC x   Sq Epi: x / Non Sq Epi: x / Bacteria: x              RADIOLOGY & ADDITIONAL TESTS:     SCHWABACHERSIMON  64y  Male  HPI:  Pt is a 64-year-old man with a past medical history of ESRD on hemodialysis MWF (last dialysis on 9/20/24), NIDDM, BPH,DM, CAD s/p  CABG, HTN, CHF, TIA, PAD, left toe ulcer s/p amputation sent in by PCP (Dr. Olivier) for increasing abdomen distention over the last 3 weeks. Patient reported that he developed new onset abdominal distention over the past 3 weeks that is now causing him abdominal pain and difficulty having PO intake with vomiting. He reported that he had to vomit small amounts of food if he eats. He also reported being constipated over the past 2 days. He denied any hematemesis, sob, chest pain, orthopnea, blood in stools, swelling of the legs, fever or chills, He is aneuric and does not produce urine. He denied any history of smoking or excessive alcohol use. Patient presented to the ED on 9/16 and underwent a C, he was discharged on the same day without further work-up. Patient is currently in [facility]    In the ED:   T: 36.5      HR: 68    BP: 122/61    Spo2: 95% on room air    CT abdomen/pelvis from 9/16/24:   1. Since March 28, 2024, no significant change in large volume abdominopelvic ascites..2. Moderate to large right inguinal hernia contains ascites fluid and a   short segment of nonobstructed cecum and distal ileum.3. Unchanged small left and trace right pleural effusions with small   partially imaged bibasilar opacities.4. A 3.6 cm hyperattenuating focus within the lateral aspect of the right gluteus muscle is suspicious for small hematoma; correlation with clinical history and exam is suggested.           (23 Sep 2024 17:34)    MEDICATIONS  (STANDING):  aspirin  chewable 81 milliGRAM(s) Oral daily  atorvastatin 40 milliGRAM(s) Oral at bedtime  chlorhexidine 2% Cloths 1 Application(s) Topical <User Schedule>  clopidogrel Tablet 75 milliGRAM(s) Oral daily  cyanocobalamin 1000 MICROGram(s) Oral daily  darbepoetin Injectable Syringe 50 MICROGram(s) IV Push <User Schedule>  dextrose 5%. 1000 milliLiter(s) (100 mL/Hr) IV Continuous <Continuous>  dextrose 5%. 1000 milliLiter(s) (50 mL/Hr) IV Continuous <Continuous>  dextrose 50% Injectable 25 Gram(s) IV Push once  dextrose 50% Injectable 12.5 Gram(s) IV Push once  dextrose 50% Injectable 25 Gram(s) IV Push once  folic acid 1 milliGRAM(s) Oral daily  glucagon  Injectable 1 milliGRAM(s) IntraMuscular once  heparin   Injectable 5000 Unit(s) SubCutaneous every 8 hours  influenza  Vaccine (HIGH DOSE) 0.5 milliLiter(s) IntraMuscular once  insulin lispro Injectable (ADMELOG) 3 Unit(s) SubCutaneous before dinner  insulin lispro Injectable (ADMELOG) 3 Unit(s) SubCutaneous before breakfast  metoclopramide 5 milliGRAM(s) Oral three times a day  metoprolol succinate ER 25 milliGRAM(s) Oral daily  NIFEdipine XL 30 milliGRAM(s) Oral daily  pantoprazole    Tablet 40 milliGRAM(s) Oral before breakfast  senna 2 Tablet(s) Oral at bedtime  tamsulosin 0.4 milliGRAM(s) Oral at bedtime    MEDICATIONS  (PRN):  acetaminophen     Tablet .. 650 milliGRAM(s) Oral every 6 hours PRN Mild Pain (1 - 3)  artificial  tears Solution 1 Drop(s) Both EYES every 2 hours PRN Dry Eyes  dextrose Oral Gel 15 Gram(s) Oral once PRN Blood Glucose LESS THAN 70 milliGRAM(s)/deciliter  ondansetron Injectable 4 milliGRAM(s) IV Push every 6 hours PRN Nausea and/or Vomiting    INTERVAL EVENTS: Patient seen today without distress, lying flat, continues to have intermittent nausea.     T(C): 36.3 (10-04-24 @ 07:15), Max: 36.6 (10-03-24 @ 15:51)  HR: 76 (10-04-24 @ 07:15) (69 - 76)  BP: 138/66 (10-04-24 @ 07:15) (110/54 - 138/66)  RR: 18 (10-04-24 @ 07:15) (18 - 18)  SpO2: 98% (10-04-24 @ 07:15) (95% - 98%)  Wt(kg): --Vital Signs Last 24 Hrs  T(C): 36.3 (04 Oct 2024 07:15), Max: 36.6 (03 Oct 2024 15:51)  T(F): 97.4 (04 Oct 2024 07:15), Max: 97.8 (03 Oct 2024 15:51)  HR: 76 (04 Oct 2024 07:15) (69 - 76)  BP: 138/66 (04 Oct 2024 07:15) (110/54 - 138/66)  BP(mean): 94 (04 Oct 2024 05:46) (94 - 94)  RR: 18 (04 Oct 2024 07:15) (18 - 18)  SpO2: 98% (04 Oct 2024 07:15) (95% - 98%)    Parameters below as of 04 Oct 2024 07:15  Patient On (Oxygen Delivery Method): room air        PHYSICAL EXAM:  GENERAL: NAD  NECK: Supple, No JVD  CHEST/LUNG: Clear, Decreased at bases  HEART: S1, S2  ABDOMEN: Soft, Nontender, distended; Bowel sounds present  EXTREMITIES: Decreased edema  SKIN: No rashes or lesions    LABS:                          7.9    4.83  )-----------( 226      ( 04 Oct 2024 06:43 )             24.9             10-04    138  |  97[L]  |  25[H]  ----------------------------<  130[H]  3.9   |  30  |  4.5[HH]    Ca    7.9[L]      04 Oct 2024 06:43  Phos  2.1     10-03  Mg     1.9     10-04    TPro  6.2  /  Alb  3.0[L]  /  TBili  0.5  /  DBili  x   /  AST  5   /  ALT  <5  /  AlkPhos  174[H]  10-04    LIVER FUNCTIONS - ( 04 Oct 2024 06:43 )  Alb: 3.0 g/dL / Pro: 6.2 g/dL / ALK PHOS: 174 U/L / ALT: <5 U/L / AST: 5 U/L / GGT: x                         Urinalysis Basic - ( 04 Oct 2024 06:43 )    Color: x / Appearance: x / SG: x / pH: x  Gluc: 130 mg/dL / Ketone: x  / Bili: x / Urobili: x   Blood: x / Protein: x / Nitrite: x   Leuk Esterase: x / RBC: x / WBC x   Sq Epi: x / Non Sq Epi: x / Bacteria: x        RADIOLOGY & ADDITIONAL TESTS:

## 2024-10-04 NOTE — PROGRESS NOTE ADULT - ASSESSMENT
64 year old male patient known to have ESRD on HD, NIDDM, CAD s/p CABG, HFpEF, HTN, TIA, PAD s/p left toe ulcer requiring amputation, BPH, and large ascites since 03/2024 who was sent to  ED by PCP on 09/23 for worsening abdominal distention, found to have stable large ascites. Status post HD session 09/24. We are consulted in setting of large ascites.    #Gastroparesis in setting of large volume ascites, well controlled DM, constipation  - Abnormally delayed emptying of the radiolabeled standardized solid material from the stomach over time with 76% retention at 4 hours post feeding (normal <10%) and 80% at 2 hours post feeding (normal range 30-60%)  - Last CTAP9/16: large volume abdominopelvic ascites  - KUB 10/2: Nonspecific air distended loops of bowel with residual oral contrast present  - Patient endorsing emesis and abdominal discomfort after oral intake, improved when attempts small meals and eats slowly   - A1c 6.6  - Chronic constipation not on bowel regimen     Tolerating diet today. No emesis. S/p paracentesis - removed 5.5L. Had 2BM today     Rec   Large volume ascites likely affecting gastric emptying  CW reglan 5mg TID   Small frequent meals, Separate solids and liquids by 30 mins  Optimize DM and blood sugar control  Daily PPI for prophylaxis  Standing bowel regimen Miralax qd and senna qhs, monitor and document BMs   Ambulate as tolerated   Avoid Anticholingerics, opioids and Calcium channel blockers  Would benefit from outpatient EGD. Can follow up with private GI or at our [facility]   [facility] located at [address] [place] [address], [facility]. Telephone No: [phone]  Please recall GI as needed    #Nephrogenic Ascites in Setting of ESRD  #Chronic Isolated ALP Elevation since at least 2022  No evidence of cirrhosis on imaging  Asymptomatic Cholelithiasis  * Incidentally found to have large ascites on CT 03/2024 during trauma workup post fall  * Takes PO lasix 40mg QD at home for CHF/ESRD  * No previous paracentesis  * No history of cirrhosis; drinks wine/gin every few months; last one was years ago  * Hemodynamically stable  * Labs: WBC 4.93, Hb 9.3, MCV 83.9, Plt 191, Na 135, K 4.1, BUN 31, Cr 5.1 09/23  * LFTs 0.6/167/6/5 on 09/23 (previous LFT 2022 0.3/178/27/31 -> 03/2024 0.3/224/20/14 -> 0.6/152/12/5 on 09/16/2024); albumin 3.1   * INR 1.21 on 09/16   * Total iron 39, S% 29%, TIBC 133, ferritin 1126 in 10/2022  * Acute hepatitis panel 2022 unremarkable  * Recent CT abdomen pelvis PO IC 09/16 normal liver, spleen, pancreas, cholelithiasis, mod-large right inguinal hernia, no significant change in large ascites; 3.6x2.8cm hematoma along lateral aspect of right gluteus  * No previous EGD; had colonoscopy with Dr Montenegro 2 years ago  * No FHx of liver diseases or GI cancers  * Status post paracentesis 09/25: 4L removed; SAAG <1.1; fluid protein 5.3; no SBP    RECOMMENDATIONS  - Trend LFTs  - For ascites: Status post paracentesis 09/25: 4L removed; SAAG <1.1; fluid protein 5.3; no SBP. Add ADA and TB PCR to fluid studies. Might benefit from retap prior to discharge as abdomen is still distended after repeat HD session on 09/26  - Nephrology team on board for ESRD management: s/p HD session on 09/24  - Serial abdominal exams  - Monitor pain: on tylenol PRN  - TTE noted: EF improved to 58% with normal RV function. Continue home dose of PO lasix 40mg QD. Discontinued aldactone 25mg QD on 09/24  - Monitor electrolytes and replete as indicated  - Avoid hepatotoxic agents    Chronic Anemia: ACD and Iron deficiency anemia  No overt GI bleeding  * Hb 8.9 on 09/16 -> 9.3 on 09/23; MCV 83.9 (previous Hb 7-8 in 03/2024)  * Total Fe 34, TIIBC 133, S% 29, ferritin 1126 in 10/2022  * INR 1.21 on 09/16  * No melena or hematochezia  * No previous EGD; had colonoscopy with Dr Montenegro 2 years ago  * No FHx of liver diseases or GI cancers    RECOMMENDATIONS  - Trend CBC and keep active T/S  - Nephrology team on board: on HD  - Continue folic acid and cyanocobalamin supplements  - Outpatient follow up with Dr Montenegro to ensure patient is uptodate with scopes

## 2024-10-04 NOTE — PROGRESS NOTE ADULT - REASON FOR ADMISSION
N/V
ab distention
Abdominal distension, pain w N/V
Abdominal distension, pain w N/V
nausea,vomiting
esrd on HD
Abdominal distension, pain w N/V
Abdominal distension, pain w N/V

## 2024-10-04 NOTE — PROGRESS NOTE ADULT - ASSESSMENT
64-year-old man with a past medical history of ESRD on hemodialysis MWF (last dialysis on 9/20/24), NIDDM, BPH,DM, CAD s/p  CABG, HTN, CHF, TIA, PAD, left toe ulcer s/p amputation sent in by PCP (Dr. Olivier) for increasing abdomen distention over the last 3 weeks.        #ESRD on HD MWF  #HFmrEF-Last TTE in 2022  - Patient cannot have po intake without vomiting sips of food. Lying comfortably in bed. Vitals WNL. PE showed distended abdomen with clear lungs  -In the ED:   T: 36.5      HR: 68    BP: 122/61    Spo2: 95% on room air  -Alk Phos: 167  -CT abdomen/pelvis from 9/16/24:   1. Since March 28, 2024, no significant change in large volume abdominopelvic ascites..2. Moderate to large right inguinal hernia contains ascites fluid and a   short segment of nonobstructed cecum and distal ileum.3. Unchanged small left and trace right pleural effusions with small   partially imaged bibasilar opacities  - TTE from 2022:  Left ventricular ejection fraction, by visual estimation, is 45 to 50%. Grade III diastolic dysfunction with elevated left atrial and left ventricular end-diastolic pressures. Moderate mitral regurgitation  - Patient on home PO lasix 40mg, aldactone 25 mg  -  Paracentesis *2, pt filled up rapidly: would benefit from increasing dose of lasix/aldactone if BP permits  -  f/u ascites lab work-up: noted, no SBP  - f/u Pro-Bnp:   - f/u TTE:  1. Low normal global left ventricular systolic function.   2. Multiple left ventricular regional wall motion abnormalities exist.    3. LV Ejection Fraction by Jose's Method with a biplane EF of 56 %.   4. No evidence of LV apical thrombus.    - Nephrology on board for HD   -  Gastric emptying study noted with marked delay of food from his stomach.  Patient with anorexia today eating very little.  Need GI review and will ask nutrition for input-Dr. Howell  - received paracentesis Multiple so far; fluid quickly reaccumulate's-nontender   -  calorie count  - Nutrition consult pending  - GI follow up ; rec reglan TID on discharge   - Paracentesis today    #CAD s/p CABG;BNP HIGH, BUT WAS HIGHER LAST ADMIT  #HTN  #History of TIA  - On home Plavix 75 mg daily, Aspirin 81 mg  - On home metoprolol succinate 25 mg , Nifedipine 60 mg daily    #DM  #left toe ulcer s/p amputation   - On Humalog 3U before and after dinner   - wound care consult placed    #Chronic constipation  - On Miralax and senna 2 tabs at night  - last BM 3 days ago     #BPH  - On flomax 0.4 mg     #Full code  #DVT prophylaxis: Heparin sub q   handoff: nutrition consult,   wound care recs, paracentesis, possible discharge over the weekend   64-year-old man with a past medical history of ESRD on hemodialysis MWF (last dialysis on 9/20/24), NIDDM, BPH,DM, CAD s/p  CABG, HTN, CHF, TIA, PAD, left toe ulcer s/p amputation sent in by PCP (Dr. Olivier) for increasing abdomen distention over the last 3 weeks.        #ESRD on HD MWF  #HFmrEF-Last TTE in 2022  - Patient cannot have po intake without vomiting sips of food. Lying comfortably in bed. Vitals WNL. PE showed distended abdomen with clear lungs  -In the ED:   T: 36.5      HR: 68    BP: 122/61    Spo2: 95% on room air  -Alk Phos: 167  -CT abdomen/pelvis from 9/16/24:   1. Since March 28, 2024, no significant change in large volume abdominopelvic ascites..2. Moderate to large right inguinal hernia contains ascites fluid and a   short segment of nonobstructed cecum and distal ileum.3. Unchanged small left and trace right pleural effusions with small   partially imaged bibasilar opacities  - TTE from 2022:  Left ventricular ejection fraction, by visual estimation, is 45 to 50%. Grade III diastolic dysfunction with elevated left atrial and left ventricular end-diastolic pressures. Moderate mitral regurgitation  - Patient on home PO lasix 40mg, aldactone 25 mg  -  Paracentesis *2, pt filled up rapidly: would benefit from increasing dose of lasix/aldactone if BP permits  -  f/u ascites lab work-up: noted, no SBP  - f/u Pro-Bnp:   - f/u TTE:  1. Low normal global left ventricular systolic function.   2. Multiple left ventricular regional wall motion abnormalities exist.    3. LV Ejection Fraction by Jose's Method with a biplane EF of 56 %.   4. No evidence of LV apical thrombus.    - Nephrology on board for HD   -  Gastric emptying study noted with marked delay of food from his stomach.  Patient with anorexia today eating very little.  Need GI review and will ask nutrition for input-Dr. Howell  - received paracentesis Multiple so far; fluid quickly reaccumulate's-nontender   -  calorie count  - Nutrition consult pending  - GI follow up ; rec reglan TID on discharge   - Paracentesis today    #CAD s/p CABG;BNP HIGH, BUT WAS HIGHER LAST ADMIT  #HTN  #History of TIA  - On home Plavix 75 mg daily, Aspirin 81 mg  - On home metoprolol succinate 25 mg , Nifedipine 60 mg daily    #DM  #left toe ulcer s/p amputation   - On Humalog 3U before and after dinner   - wound care consult placed    #Chronic constipation  - On Miralax and senna 2 tabs at night  - last BM 3 days ago     #BPH  - On flomax 0.4 mg     #Full code  #DVT prophylaxis: Heparin sub q   handoff: nutrition consult,   wound care recs, paracentesis Today , possible discharge over the weekend, LT [facility]

## 2024-10-05 LAB
ALBUMIN SERPL ELPH-MCNC: 2.7 G/DL — LOW (ref 3.5–5.2)
ALP SERPL-CCNC: 170 U/L — HIGH (ref 30–115)
ALT FLD-CCNC: <5 U/L — SIGNIFICANT CHANGE UP (ref 0–41)
ANION GAP SERPL CALC-SCNC: 9 MMOL/L — SIGNIFICANT CHANGE UP (ref 7–14)
AST SERPL-CCNC: 6 U/L — SIGNIFICANT CHANGE UP (ref 0–41)
BASOPHILS # BLD AUTO: 0.04 K/UL — SIGNIFICANT CHANGE UP (ref 0–0.2)
BASOPHILS NFR BLD AUTO: 0.9 % — SIGNIFICANT CHANGE UP (ref 0–1)
BILIRUB SERPL-MCNC: 0.4 MG/DL — SIGNIFICANT CHANGE UP (ref 0.2–1.2)
BUN SERPL-MCNC: 32 MG/DL — HIGH (ref 10–20)
CALCIUM SERPL-MCNC: 7.8 MG/DL — LOW (ref 8.4–10.5)
CHLORIDE SERPL-SCNC: 94 MMOL/L — LOW (ref 98–110)
CO2 SERPL-SCNC: 30 MMOL/L — SIGNIFICANT CHANGE UP (ref 17–32)
CREAT SERPL-MCNC: 4.8 MG/DL — CRITICAL HIGH (ref 0.7–1.5)
EGFR: 13 ML/MIN/1.73M2 — LOW
EOSINOPHIL # BLD AUTO: 0.23 K/UL — SIGNIFICANT CHANGE UP (ref 0–0.7)
EOSINOPHIL NFR BLD AUTO: 5 % — SIGNIFICANT CHANGE UP (ref 0–8)
GLUCOSE BLDC GLUCOMTR-MCNC: 135 MG/DL — HIGH (ref 70–99)
GLUCOSE BLDC GLUCOMTR-MCNC: 170 MG/DL — HIGH (ref 70–99)
GLUCOSE BLDC GLUCOMTR-MCNC: 193 MG/DL — HIGH (ref 70–99)
GLUCOSE SERPL-MCNC: 147 MG/DL — HIGH (ref 70–99)
HCT VFR BLD CALC: 24 % — LOW (ref 42–52)
HGB BLD-MCNC: 7.5 G/DL — LOW (ref 14–18)
IMM GRANULOCYTES NFR BLD AUTO: 0.4 % — HIGH (ref 0.1–0.3)
LYMPHOCYTES # BLD AUTO: 0.59 K/UL — LOW (ref 1.2–3.4)
LYMPHOCYTES # BLD AUTO: 12.9 % — LOW (ref 20.5–51.1)
MCHC RBC-ENTMCNC: 26.3 PG — LOW (ref 27–31)
MCHC RBC-ENTMCNC: 31.3 G/DL — LOW (ref 32–37)
MCV RBC AUTO: 84.2 FL — SIGNIFICANT CHANGE UP (ref 80–94)
MONOCYTES # BLD AUTO: 0.49 K/UL — SIGNIFICANT CHANGE UP (ref 0.1–0.6)
MONOCYTES NFR BLD AUTO: 10.7 % — HIGH (ref 1.7–9.3)
NEUTROPHILS # BLD AUTO: 3.19 K/UL — SIGNIFICANT CHANGE UP (ref 1.4–6.5)
NEUTROPHILS NFR BLD AUTO: 70.1 % — SIGNIFICANT CHANGE UP (ref 42.2–75.2)
NRBC # BLD: 0 /100 WBCS — SIGNIFICANT CHANGE UP (ref 0–0)
PLATELET # BLD AUTO: 250 K/UL — SIGNIFICANT CHANGE UP (ref 130–400)
PMV BLD: 10.7 FL — HIGH (ref 7.4–10.4)
POTASSIUM SERPL-MCNC: 4.2 MMOL/L — SIGNIFICANT CHANGE UP (ref 3.5–5)
POTASSIUM SERPL-SCNC: 4.2 MMOL/L — SIGNIFICANT CHANGE UP (ref 3.5–5)
PROT SERPL-MCNC: 6 G/DL — SIGNIFICANT CHANGE UP (ref 6–8)
RBC # BLD: 2.85 M/UL — LOW (ref 4.7–6.1)
RBC # FLD: 17.5 % — HIGH (ref 11.5–14.5)
SODIUM SERPL-SCNC: 133 MMOL/L — LOW (ref 135–146)
WBC # BLD: 4.56 K/UL — LOW (ref 4.8–10.8)
WBC # FLD AUTO: 4.56 K/UL — LOW (ref 4.8–10.8)

## 2024-10-05 RX ADMIN — Medication 1000 MICROGRAM(S): at 12:12

## 2024-10-05 RX ADMIN — ATORVASTATIN CALCIUM 40 MILLIGRAM(S): 10 TABLET, FILM COATED ORAL at 22:27

## 2024-10-05 RX ADMIN — Medication 0.4 MILLIGRAM(S): at 22:26

## 2024-10-05 RX ADMIN — Medication 81 MILLIGRAM(S): at 12:12

## 2024-10-05 RX ADMIN — Medication 25 MILLIGRAM(S): at 05:19

## 2024-10-05 RX ADMIN — PANTOPRAZOLE SODIUM 40 MILLIGRAM(S): 40 TABLET, DELAYED RELEASE ORAL at 05:19

## 2024-10-05 RX ADMIN — Medication 5000 UNIT(S): at 14:16

## 2024-10-05 RX ADMIN — Medication 75 MILLIGRAM(S): at 12:12

## 2024-10-05 RX ADMIN — Medication 2 TABLET(S): at 22:27

## 2024-10-05 RX ADMIN — Medication 5 MILLIGRAM(S): at 14:15

## 2024-10-05 RX ADMIN — CHLORHEXIDINE GLUCONATE ORAL RINSE 1 APPLICATION(S): 1.2 SOLUTION DENTAL at 05:20

## 2024-10-05 RX ADMIN — FOLIC ACID 1 MILLIGRAM(S): 1 TABLET ORAL at 12:12

## 2024-10-05 RX ADMIN — Medication 5 MILLIGRAM(S): at 05:18

## 2024-10-05 RX ADMIN — Medication 5000 UNIT(S): at 22:26

## 2024-10-05 RX ADMIN — Medication 3 UNIT(S): at 17:21

## 2024-10-05 RX ADMIN — Medication 30 MILLIGRAM(S): at 05:19

## 2024-10-05 RX ADMIN — Medication 5000 UNIT(S): at 05:18

## 2024-10-05 RX ADMIN — Medication 5 MILLIGRAM(S): at 22:27

## 2024-10-05 RX ADMIN — Medication 3 UNIT(S): at 08:07

## 2024-10-05 NOTE — PROGRESS NOTE ADULT - SUBJECTIVE AND OBJECTIVE BOX
SCHWABACHERSIMON  64y  Male  HPI:  Pt is a 64-year-old man with a past medical history of ESRD on hemodialysis MWF (last dialysis on 9/20/24), NIDDM, BPH,DM, CAD s/p  CABG, HTN, CHF, TIA, PAD, left toe ulcer s/p amputation sent in by PCP (Dr. Olivier) for increasing abdomen distention over the last 3 weeks. Patient reported that he developed new onset abdominal distention over the past 3 weeks that is now causing him abdominal pain and difficulty having PO intake with vomiting. He reported that he had to vomit small amounts of food if he eats. He also reported being constipated over the past 2 days. He denied any hematemesis, sob, chest pain, orthopnea, blood in stools, swelling of the legs, fever or chills, He is aneuric and does not produce urine. He denied any history of smoking or excessive alcohol use. Patient presented to the ED on 9/16 and underwent a C, he was discharged on the same day without further work-up. Patient is currently in [facility]    In the ED:   T: 36.5      HR: 68    BP: 122/61    Spo2: 95% on room air    CT abdomen/pelvis from 9/16/24:   1. Since March 28, 2024, no significant change in large volume abdominopelvic ascites..2. Moderate to large right inguinal hernia contains ascites fluid and a   short segment of nonobstructed cecum and distal ileum.3. Unchanged small left and trace right pleural effusions with small   partially imaged bibasilar opacities.4. A 3.6 cm hyperattenuating focus within the lateral aspect of the right gluteus muscle is suspicious for small hematoma; correlation with clinical history and exam is suggested.           (23 Sep 2024 17:34)    MEDICATIONS  (STANDING):  aspirin  chewable 81 milliGRAM(s) Oral daily  atorvastatin 40 milliGRAM(s) Oral at bedtime  chlorhexidine 2% Cloths 1 Application(s) Topical <User Schedule>  clopidogrel Tablet 75 milliGRAM(s) Oral daily  cyanocobalamin 1000 MICROGram(s) Oral daily  darbepoetin Injectable Syringe 50 MICROGram(s) IV Push <User Schedule>  dextrose 5%. 1000 milliLiter(s) (50 mL/Hr) IV Continuous <Continuous>  dextrose 5%. 1000 milliLiter(s) (100 mL/Hr) IV Continuous <Continuous>  dextrose 50% Injectable 25 Gram(s) IV Push once  dextrose 50% Injectable 12.5 Gram(s) IV Push once  dextrose 50% Injectable 25 Gram(s) IV Push once  folic acid 1 milliGRAM(s) Oral daily  glucagon  Injectable 1 milliGRAM(s) IntraMuscular once  heparin   Injectable 5000 Unit(s) SubCutaneous every 8 hours  influenza  Vaccine (HIGH DOSE) 0.5 milliLiter(s) IntraMuscular once  insulin lispro Injectable (ADMELOG) 3 Unit(s) SubCutaneous before dinner  insulin lispro Injectable (ADMELOG) 3 Unit(s) SubCutaneous before breakfast  metoclopramide 5 milliGRAM(s) Oral three times a day  metoprolol succinate ER 25 milliGRAM(s) Oral daily  pantoprazole    Tablet 40 milliGRAM(s) Oral before breakfast  senna 2 Tablet(s) Oral at bedtime  tamsulosin 0.4 milliGRAM(s) Oral at bedtime    MEDICATIONS  (PRN):  acetaminophen     Tablet .. 650 milliGRAM(s) Oral every 6 hours PRN Mild Pain (1 - 3)  artificial  tears Solution 1 Drop(s) Both EYES every 2 hours PRN Dry Eyes  dextrose Oral Gel 15 Gram(s) Oral once PRN Blood Glucose LESS THAN 70 milliGRAM(s)/deciliter  ondansetron Injectable 4 milliGRAM(s) IV Push every 6 hours PRN Nausea and/or Vomiting    INTERVAL EVENTS: Patient seen today without distress on HMD    T(C): 36.5 (10-05-24 @ 07:51), Max: 36.5 (10-05-24 @ 05:00)  HR: 68 (10-05-24 @ 11:15) (64 - 78)  BP: 113/53 (10-05-24 @ 11:15) (113/53 - 135/65)  RR: 18 (10-05-24 @ 11:15) (18 - 18)  SpO2: 97% (10-05-24 @ 07:51) (97% - 98%)  Wt(kg): --Vital Signs Last 24 Hrs  T(C): 36.5 (05 Oct 2024 07:51), Max: 36.5 (05 Oct 2024 05:00)  T(F): 97.7 (05 Oct 2024 07:51), Max: 97.7 (05 Oct 2024 05:00)  HR: 68 (05 Oct 2024 11:15) (64 - 78)  BP: 113/53 (05 Oct 2024 11:15) (113/53 - 135/65)  BP(mean): --  RR: 18 (05 Oct 2024 11:15) (18 - 18)  SpO2: 97% (05 Oct 2024 07:51) (97% - 98%)    Parameters below as of 05 Oct 2024 07:51  Patient On (Oxygen Delivery Method): room air        PHYSICAL EXAM:  GENERAL: NAD  NECK: Supple, No JVD  CHEST/LUNG: Clear  HEART: S1, S2, Regular   ABDOMEN: Soft, Nontender, Mildly distended; Bowel sounds present  EXTREMITIES: No  edema  SKIN: No rashes or lesions    LABS:  Labs:                        7.5    4.56  )-----------( 250      ( 05 Oct 2024 06:01 )             24.0             10-05    133[L]  |  94[L]  |  32[H]  ----------------------------<  147[H]  4.2   |  30  |  4.8[HH]    Ca    7.8[L]      05 Oct 2024 06:01  Mg     1.9     10-04    TPro  6.0  /  Alb  2.7[L]  /  TBili  0.4  /  DBili  x   /  AST  6   /  ALT  <5  /  AlkPhos  170[H]  10-05    LIVER FUNCTIONS - ( 05 Oct 2024 06:01 )  Alb: 2.7 g/dL / Pro: 6.0 g/dL / ALK PHOS: 170 U/L / ALT: <5 U/L / AST: 6 U/L / GGT: x                         Urinalysis Basic - ( 05 Oct 2024 06:01 )    Color: x / Appearance: x / SG: x / pH: x  Gluc: 147 mg/dL / Ketone: x  / Bili: x / Urobili: x   Blood: x / Protein: x / Nitrite: x   Leuk Esterase: x / RBC: x / WBC x   Sq Epi: x / Non Sq Epi: x / Bacteria: x      RADIOLOGY & ADDITIONAL TESTS:

## 2024-10-05 NOTE — ED PROVIDER NOTE - OBJECTIVE STATEMENT
59 year old male, pmhx CAD with CABG, HTN, DM, chronic anemia, CKD (not on dialysis), severe PVD (follows with Dr. Fernandez from vascular), presenting with a 1 week history of dyspnea on exertion that has been worsening. Patient states he can barely exert himself without feeling dyspneic. Most recently had cardiac testing 1 month ago (echo) which he believes was normal. Last stress testing was several years ago and was normal as well (exercise stress). States he has also had left leg swelling which he states is chronic. Otherwise denies pain and denies fevers, headache, vision changes, weakness/numbness, confusion, URI symptoms, neck pain, chest pain, back pain, cough, palpitations, nausea, vomiting, abdominal pain, diarrhea, constipation, blood in stool/dark stools, urinary symptoms, penile discharge/testicular pain, rash, recent travel or sick contacts.
fair balance

## 2024-10-05 NOTE — PROGRESS NOTE ADULT - ASSESSMENT
64-year-old man with a past medical history of ESRD on hemodialysis MWF (last dialysis on 9/20/24), NIDDM, BPH,DM, CAD s/p  CABG, HTN, CHF, TIA, PAD, left toe ulcer s/p amputation sent in by PCP (Dr. Olivier) for increasing abdomen distention over the last 3 weeks.  HD today standard bath uf 2 liters as tolerated    last phos noted, no binders   s/p paracentesis with 4 liters fluid removal 9/25 and 10/4 followed by hepatology    bp on the low side/ d.c nifedipine    last h/h noted  on RAMSEY  / ferritin on the high side/ sat pending / transfuse if Hb < 7   will follow

## 2024-10-05 NOTE — PROGRESS NOTE ADULT - ASSESSMENT
64 year old man from SNF with a past medical history of ESRD on hemodialysis [facility] (last dialysis on 9/20/24), NIDDM, BPH, DM, CAD s/p  CABG, HTN, CHF, TIA, PAD, left toe ulcer s/p amputation sent in by PCP (Dr. Olivier) for increasing abdomen distention over the last 3 weeks. Patient with increase abdominal girth despite weight loss. Patient's weight done 20 lbs in the last month    Nausea, Vomiting improved, still occurs after meals  Ascites : Likely Nephrogenic Ascites, No evidence of Cirrhosis  - GI / Hepatology evaluation noted  - paracentesis done with removal of 4 liters 9/25, 6 liters on 9/27, & 5.5 liters 10/4  - no evidence of SBP  - positive Abnormality : delayed emptying : gastroparesis  - started on Reglan    ESRD  - continue HMD  - weight continues trending down?  - asked nurse to document weight  - leg edema resolved  - need daily weights    CAD, post CABG, CHF  on rx  - 2d echo results    DM type 2 with neurologic, ophth, vascular, and renal manifestions  PAD, hx of toe amputation  retinopathy  gastroparesis, poly neuropathy  - podiatry follows as outpatient      GI prophylaxis   DVT Heparin  Diet resumed, encourage small meals    Patient remains acute. Continue to monitor weight. Reviewed SNF chart, weight down from 226 lbs last month.  D/C planning back to [facility]. Financial reasons may hinder discharge. Patient to sign agreement on Monday

## 2024-10-05 NOTE — PROGRESS NOTE ADULT - SUBJECTIVE AND OBJECTIVE BOX
seen and examined   24 h events noted      PAST HISTORY  --------------------------------------------------------------------------------  No significant changes to PMH, PSH, FHx, SHx, unless otherwise noted    ALLERGIES & MEDICATIONS  --------------------------------------------------------------------------------  Allergies    No Known Allergies    Intolerances      Standing Inpatient Medications  aspirin  chewable 81 milliGRAM(s) Oral daily  atorvastatin 40 milliGRAM(s) Oral at bedtime  chlorhexidine 2% Cloths 1 Application(s) Topical <User Schedule>  clopidogrel Tablet 75 milliGRAM(s) Oral daily  cyanocobalamin 1000 MICROGram(s) Oral daily  darbepoetin Injectable Syringe 50 MICROGram(s) IV Push <User Schedule>  dextrose 5%. 1000 milliLiter(s) IV Continuous <Continuous>  dextrose 5%. 1000 milliLiter(s) IV Continuous <Continuous>  dextrose 50% Injectable 25 Gram(s) IV Push once  dextrose 50% Injectable 12.5 Gram(s) IV Push once  dextrose 50% Injectable 25 Gram(s) IV Push once  folic acid 1 milliGRAM(s) Oral daily  glucagon  Injectable 1 milliGRAM(s) IntraMuscular once  heparin   Injectable 5000 Unit(s) SubCutaneous every 8 hours  influenza  Vaccine (HIGH DOSE) 0.5 milliLiter(s) IntraMuscular once  insulin lispro Injectable (ADMELOG) 3 Unit(s) SubCutaneous before dinner  insulin lispro Injectable (ADMELOG) 3 Unit(s) SubCutaneous before breakfast  metoclopramide 5 milliGRAM(s) Oral three times a day  metoprolol succinate ER 25 milliGRAM(s) Oral daily  NIFEdipine XL 30 milliGRAM(s) Oral daily  pantoprazole    Tablet 40 milliGRAM(s) Oral before breakfast  senna 2 Tablet(s) Oral at bedtime  tamsulosin 0.4 milliGRAM(s) Oral at bedtime    PRN Inpatient Medications  acetaminophen     Tablet .. 650 milliGRAM(s) Oral every 6 hours PRN  artificial  tears Solution 1 Drop(s) Both EYES every 2 hours PRN  dextrose Oral Gel 15 Gram(s) Oral once PRN  ondansetron Injectable 4 milliGRAM(s) IV Push every 6 hours PRN        VITALS/PHYSICAL EXAM  --------------------------------------------------------------------------------  T(C): 36.5 (10-05-24 @ 07:51), Max: 36.5 (10-05-24 @ 05:00)  HR: 64 (10-05-24 @ 08:15) (64 - 78)  BP: 114/53 (10-05-24 @ 08:15) (114/53 - 138/68)  RR: 18 (10-05-24 @ 08:15) (18 - 18)  SpO2: 97% (10-05-24 @ 07:51) (97% - 98%)  Wt(kg): --        Physical Exam:  	Gen: NAD  	Pulm: decrease BS  B/L  	CV:  S1S2; no rub  	Abd: +distended  	LE: dressing  	Vascular access:av    LABS/STUDIES  --------------------------------------------------------------------------------              7.5    4.56  >-----------<  250      [10-05-24 @ 06:01]              24.0     133  |  94  |  32  ----------------------------<  147      [10-05-24 @ 06:01]  4.2   |  30  |  4.8        Ca     7.8     [10-05-24 @ 06:01]      Mg     1.9     [10-04-24 @ 06:43]      Phos  2.1     [10-03-24 @ 16:44]    TPro  6.0  /  Alb  2.7  /  TBili  0.4  /  DBili  x   /  AST  6   /  ALT  <5  /  AlkPhos  170  [10-05-24 @ 06:01]          Creatinine Trend:  SCr 4.8 [10-05 @ 06:01]  SCr 4.5 [10-04 @ 06:43]  SCr 3.7 [10-03 @ 16:44]  SCr 5.3 [09-30 @ 06:28]  SCr 4.4 [09-29 @ 06:20]    Urinalysis - [10-05-24 @ 06:01]      Color  / Appearance  / SG  / pH       Gluc 147 / Ketone   / Bili  / Urobili        Blood  / Protein  / Leuk Est  / Nitrite       RBC  / WBC  / Hyaline  / Gran  / Sq Epi  / Non Sq Epi  / Bacteria       Iron 93, TIBC --, %sat --      [10-01-24 @ 07:00]  Ferritin 1129      [10-01-24 @ 07:00]  PTH -- (Ca 7.8)      [09-24-24 @ 06:56]   135  Vitamin D (25OH) 15      [09-25-24 @ 06:49]  HbA1c 5.8      [01-30-20 @ 06:46]

## 2024-10-06 LAB
GLUCOSE BLDC GLUCOMTR-MCNC: 147 MG/DL — HIGH (ref 70–99)
GLUCOSE BLDC GLUCOMTR-MCNC: 154 MG/DL — HIGH (ref 70–99)
GLUCOSE BLDC GLUCOMTR-MCNC: 163 MG/DL — HIGH (ref 70–99)
GLUCOSE BLDC GLUCOMTR-MCNC: 205 MG/DL — HIGH (ref 70–99)

## 2024-10-06 RX ADMIN — Medication 5000 UNIT(S): at 05:40

## 2024-10-06 RX ADMIN — FOLIC ACID 1 MILLIGRAM(S): 1 TABLET ORAL at 11:13

## 2024-10-06 RX ADMIN — Medication 81 MILLIGRAM(S): at 11:12

## 2024-10-06 RX ADMIN — Medication 5 MILLIGRAM(S): at 13:42

## 2024-10-06 RX ADMIN — Medication 5000 UNIT(S): at 13:42

## 2024-10-06 RX ADMIN — Medication 3 UNIT(S): at 16:56

## 2024-10-06 RX ADMIN — CHLORHEXIDINE GLUCONATE ORAL RINSE 1 APPLICATION(S): 1.2 SOLUTION DENTAL at 05:44

## 2024-10-06 RX ADMIN — Medication 0.4 MILLIGRAM(S): at 21:36

## 2024-10-06 RX ADMIN — Medication 5 MILLIGRAM(S): at 05:40

## 2024-10-06 RX ADMIN — Medication 75 MILLIGRAM(S): at 11:12

## 2024-10-06 RX ADMIN — Medication 1000 MICROGRAM(S): at 11:13

## 2024-10-06 RX ADMIN — Medication 25 MILLIGRAM(S): at 05:37

## 2024-10-06 RX ADMIN — PANTOPRAZOLE SODIUM 40 MILLIGRAM(S): 40 TABLET, DELAYED RELEASE ORAL at 05:38

## 2024-10-06 RX ADMIN — Medication 2 TABLET(S): at 21:35

## 2024-10-06 RX ADMIN — Medication 3 UNIT(S): at 07:57

## 2024-10-06 RX ADMIN — ATORVASTATIN CALCIUM 40 MILLIGRAM(S): 10 TABLET, FILM COATED ORAL at 21:36

## 2024-10-06 RX ADMIN — Medication 5 MILLIGRAM(S): at 21:35

## 2024-10-06 RX ADMIN — Medication 5000 UNIT(S): at 21:35

## 2024-10-06 NOTE — PROGRESS NOTE ADULT - SUBJECTIVE AND OBJECTIVE BOX
SCHWABACHERSIMON  64y  Male  HPI:  Pt is a 64-year-old man with a past medical history of ESRD on hemodialysis MWF (last dialysis on 9/20/24), NIDDM, BPH,DM, CAD s/p  CABG, HTN, CHF, TIA, PAD, left toe ulcer s/p amputation sent in by PCP (Dr. Olivier) for increasing abdomen distention over the last 3 weeks. Patient reported that he developed new onset abdominal distention over the past 3 weeks that is now causing him abdominal pain and difficulty having PO intake with vomiting. He reported that he had to vomit small amounts of food if he eats. He also reported being constipated over the past 2 days. He denied any hematemesis, sob, chest pain, orthopnea, blood in stools, swelling of the legs, fever or chills, He is aneuric and does not produce urine. He denied any history of smoking or excessive alcohol use. Patient presented to the ED on 9/16 and underwent a C, he was discharged on the same day without further work-up. Patient is currently in [facility]    In the ED:   T: 36.5      HR: 68    BP: 122/61    Spo2: 95% on room air    CT abdomen/pelvis from 9/16/24:   1. Since March 28, 2024, no significant change in large volume abdominopelvic ascites..2. Moderate to large right inguinal hernia contains ascites fluid and a   short segment of nonobstructed cecum and distal ileum.3. Unchanged small left and trace right pleural effusions with small   partially imaged bibasilar opacities.4. A 3.6 cm hyperattenuating focus within the lateral aspect of the right gluteus muscle is suspicious for small hematoma; correlation with clinical history and exam is suggested.           (23 Sep 2024 17:34)    MEDICATIONS  (STANDING):  aspirin  chewable 81 milliGRAM(s) Oral daily  atorvastatin 40 milliGRAM(s) Oral at bedtime  chlorhexidine 2% Cloths 1 Application(s) Topical <User Schedule>  clopidogrel Tablet 75 milliGRAM(s) Oral daily  cyanocobalamin 1000 MICROGram(s) Oral daily  darbepoetin Injectable Syringe 50 MICROGram(s) IV Push <User Schedule>  dextrose 5%. 1000 milliLiter(s) (50 mL/Hr) IV Continuous <Continuous>  dextrose 5%. 1000 milliLiter(s) (100 mL/Hr) IV Continuous <Continuous>  dextrose 50% Injectable 25 Gram(s) IV Push once  dextrose 50% Injectable 12.5 Gram(s) IV Push once  dextrose 50% Injectable 25 Gram(s) IV Push once  folic acid 1 milliGRAM(s) Oral daily  glucagon  Injectable 1 milliGRAM(s) IntraMuscular once  heparin   Injectable 5000 Unit(s) SubCutaneous every 8 hours  influenza  Vaccine (HIGH DOSE) 0.5 milliLiter(s) IntraMuscular once  insulin lispro Injectable (ADMELOG) 3 Unit(s) SubCutaneous before dinner  insulin lispro Injectable (ADMELOG) 3 Unit(s) SubCutaneous before breakfast  metoclopramide 5 milliGRAM(s) Oral three times a day  metoprolol succinate ER 25 milliGRAM(s) Oral daily  pantoprazole    Tablet 40 milliGRAM(s) Oral before breakfast  senna 2 Tablet(s) Oral at bedtime  tamsulosin 0.4 milliGRAM(s) Oral at bedtime    MEDICATIONS  (PRN):  acetaminophen     Tablet .. 650 milliGRAM(s) Oral every 6 hours PRN Mild Pain (1 - 3)  artificial  tears Solution 1 Drop(s) Both EYES every 2 hours PRN Dry Eyes  dextrose Oral Gel 15 Gram(s) Oral once PRN Blood Glucose LESS THAN 70 milliGRAM(s)/deciliter  ondansetron Injectable 4 milliGRAM(s) IV Push every 6 hours PRN Nausea and/or Vomiting    INTERVAL EVENTS:    T(C): 36.3 (10-06-24 @ 08:43), Max: 36.4 (10-06-24 @ 00:00)  HR: 78 (10-06-24 @ 08:43) (65 - 78)  BP: 98/60 (10-06-24 @ 08:43) (98/60 - 130/71)  RR: 18 (10-06-24 @ 08:43) (18 - 18)  SpO2: 97% (10-06-24 @ 08:43) (97% - 99%)  Wt(kg): --Vital Signs Last 24 Hrs  T(C): 36.3 (06 Oct 2024 08:43), Max: 36.4 (06 Oct 2024 00:00)  T(F): 97.4 (06 Oct 2024 08:43), Max: 97.6 (06 Oct 2024 00:00)  HR: 78 (06 Oct 2024 08:43) (65 - 78)  BP: 98/60 (06 Oct 2024 08:43) (98/60 - 130/71)  BP(mean): --  RR: 18 (06 Oct 2024 08:43) (18 - 18)  SpO2: 97% (06 Oct 2024 08:43) (97% - 99%)    Parameters below as of 06 Oct 2024 08:43  Patient On (Oxygen Delivery Method): room air        PHYSICAL EXAM:  GENERAL:   NECK: Supple, No JVD, Normal thyroid  CHEST/LUNG: Clear; No crackles or wheezing  HEART: S1, S2, Regular rate and rhythm;   ABDOMEN: Soft, Nontender, Nondistended; Bowel sounds present  EXTREMITIES: No clubbing, cyanosis, or edema  SKIN: No rashes or lesions    LABS:                          7.5    4.56  )-----------( 250      ( 05 Oct 2024 06:01 )             24.0             10-05    133[L]  |  94[L]  |  32[H]  ----------------------------<  147[H]  4.2   |  30  |  4.8[HH]    Ca    7.8[L]      05 Oct 2024 06:01    TPro  6.0  /  Alb  2.7[L]  /  TBili  0.4  /  DBili  x   /  AST  6   /  ALT  <5  /  AlkPhos  170[H]  10-05    LIVER FUNCTIONS - ( 05 Oct 2024 06:01 )  Alb: 2.7 g/dL / Pro: 6.0 g/dL / ALK PHOS: 170 U/L / ALT: <5 U/L / AST: 6 U/L / GGT: x                         Urinalysis Basic - ( 05 Oct 2024 06:01 )    Color: x / Appearance: x / SG: x / pH: x  Gluc: 147 mg/dL / Ketone: x  / Bili: x / Urobili: x   Blood: x / Protein: x / Nitrite: x   Leuk Esterase: x / RBC: x / WBC x   Sq Epi: x / Non Sq Epi: x / Bacteria: x        RADIOLOGY & ADDITIONAL TESTS:  < from: TTE Echo w/ Contrast Follow Up Limited (09.29.24 @ 14:42) >  Procedure:   Follow up or Limited Echocardiogram and Echocardiogram with               Definity Contrast.  Indications: I71.9 - Aortic Aneurysm of unspecified Site, without Rupture  Diagnosis:I71.9 - Aortic aneurysm of unspecified site, without rupture        Summary:   1. Low normal global left ventricular systolic function.   2. Multiple left ventricular regional wall motion abnormalities exist.   See wall motion findings.   3. LV Ejection Fraction by Jose's Method with a biplane EF of 56 %.   4. No evidence of LV apical thrombus.   5. Endocardial visualization was enhanced with intravenous echo contrast.   6. Apical wall motion abnormalities appear stable compared to prior   studies.    PHYSICIAN INTERPRETATION:  Left Ventricle: Endocardial visualization was enhanced with intravenous   echo contrast. Global LV systolic function was normal. No evidence of LV   apical thrombus.      LV Wall Scoring:  The apical lateral segmentand apex are akinetic. The apical septal   segment,  apical anterior segment, and apical inferior segment are hypokinetic. All  remaining scored segments are normal.      < end of copied text >     SCHWABACHERSIMON  64y  Male  HPI:  Pt is a 64-year-old man with a past medical history of ESRD on hemodialysis MWF (last dialysis on 9/20/24), NIDDM, BPH,DM, CAD s/p  CABG, HTN, CHF, TIA, PAD, left toe ulcer s/p amputation sent in by PCP (Dr. Olivier) for increasing abdomen distention over the last 3 weeks. Patient reported that he developed new onset abdominal distention over the past 3 weeks that is now causing him abdominal pain and difficulty having PO intake with vomiting. He reported that he had to vomit small amounts of food if he eats. He also reported being constipated over the past 2 days. He denied any hematemesis, sob, chest pain, orthopnea, blood in stools, swelling of the legs, fever or chills, He is aneuric and does not produce urine. He denied any history of smoking or excessive alcohol use. Patient presented to the ED on 9/16 and underwent a C, he was discharged on the same day without further work-up. Patient is currently in [facility]    In the ED:   T: 36.5      HR: 68    BP: 122/61    Spo2: 95% on room air    CT abdomen/pelvis from 9/16/24:   1. Since March 28, 2024, no significant change in large volume abdominopelvic ascites..2. Moderate to large right inguinal hernia contains ascites fluid and a   short segment of nonobstructed cecum and distal ileum.3. Unchanged small left and trace right pleural effusions with small   partially imaged bibasilar opacities.4. A 3.6 cm hyperattenuating focus within the lateral aspect of the right gluteus muscle is suspicious for small hematoma; correlation with clinical history and exam is suggested.           (23 Sep 2024 17:34)    MEDICATIONS  (STANDING):  aspirin  chewable 81 milliGRAM(s) Oral daily  atorvastatin 40 milliGRAM(s) Oral at bedtime  chlorhexidine 2% Cloths 1 Application(s) Topical <User Schedule>  clopidogrel Tablet 75 milliGRAM(s) Oral daily  cyanocobalamin 1000 MICROGram(s) Oral daily  darbepoetin Injectable Syringe 50 MICROGram(s) IV Push <User Schedule>  dextrose 5%. 1000 milliLiter(s) (50 mL/Hr) IV Continuous <Continuous>  dextrose 5%. 1000 milliLiter(s) (100 mL/Hr) IV Continuous <Continuous>  dextrose 50% Injectable 25 Gram(s) IV Push once  dextrose 50% Injectable 12.5 Gram(s) IV Push once  dextrose 50% Injectable 25 Gram(s) IV Push once  folic acid 1 milliGRAM(s) Oral daily  glucagon  Injectable 1 milliGRAM(s) IntraMuscular once  heparin   Injectable 5000 Unit(s) SubCutaneous every 8 hours  influenza  Vaccine (HIGH DOSE) 0.5 milliLiter(s) IntraMuscular once  insulin lispro Injectable (ADMELOG) 3 Unit(s) SubCutaneous before dinner  insulin lispro Injectable (ADMELOG) 3 Unit(s) SubCutaneous before breakfast  metoclopramide 5 milliGRAM(s) Oral three times a day  metoprolol succinate ER 25 milliGRAM(s) Oral daily  pantoprazole    Tablet 40 milliGRAM(s) Oral before breakfast  senna 2 Tablet(s) Oral at bedtime  tamsulosin 0.4 milliGRAM(s) Oral at bedtime    MEDICATIONS  (PRN):  acetaminophen     Tablet .. 650 milliGRAM(s) Oral every 6 hours PRN Mild Pain (1 - 3)  artificial  tears Solution 1 Drop(s) Both EYES every 2 hours PRN Dry Eyes  dextrose Oral Gel 15 Gram(s) Oral once PRN Blood Glucose LESS THAN 70 milliGRAM(s)/deciliter  ondansetron Injectable 4 milliGRAM(s) IV Push every 6 hours PRN Nausea and/or Vomiting    INTERVAL EVENTS: Patient seen this am without distress, still not weighed?    T(C): 36.3 (10-06-24 @ 08:43), Max: 36.4 (10-06-24 @ 00:00)  HR: 78 (10-06-24 @ 08:43) (65 - 78)  BP: 98/60 (10-06-24 @ 08:43) (98/60 - 130/71)  RR: 18 (10-06-24 @ 08:43) (18 - 18)  SpO2: 97% (10-06-24 @ 08:43) (97% - 99%)  Wt(kg): --Vital Signs Last 24 Hrs  T(C): 36.3 (06 Oct 2024 08:43), Max: 36.4 (06 Oct 2024 00:00)  T(F): 97.4 (06 Oct 2024 08:43), Max: 97.6 (06 Oct 2024 00:00)  HR: 78 (06 Oct 2024 08:43) (65 - 78)  BP: 98/60 (06 Oct 2024 08:43) (98/60 - 130/71)  BP(mean): --  RR: 18 (06 Oct 2024 08:43) (18 - 18)  SpO2: 97% (06 Oct 2024 08:43) (97% - 99%)    Parameters below as of 06 Oct 2024 08:43  Patient On (Oxygen Delivery Method): room air        PHYSICAL EXAM:  GENERAL: NAD  NECK: Supple, No JVD  CHEST/LUNG: Clear  HEART: S1, S2, Regular   ABDOMEN: Soft, Nontender; Bowel sounds present  EXTREMITIES: Decreased edema  SKIN: No rashes or lesions    LABS:                          7.5    4.56  )-----------( 250      ( 05 Oct 2024 06:01 )             24.0             10-05    133[L]  |  94[L]  |  32[H]  ----------------------------<  147[H]  4.2   |  30  |  4.8[HH]    Ca    7.8[L]      05 Oct 2024 06:01    TPro  6.0  /  Alb  2.7[L]  /  TBili  0.4  /  DBili  x   /  AST  6   /  ALT  <5  /  AlkPhos  170[H]  10-05    LIVER FUNCTIONS - ( 05 Oct 2024 06:01 )  Alb: 2.7 g/dL / Pro: 6.0 g/dL / ALK PHOS: 170 U/L / ALT: <5 U/L / AST: 6 U/L / GGT: x                         Urinalysis Basic - ( 05 Oct 2024 06:01 )    Color: x / Appearance: x / SG: x / pH: x  Gluc: 147 mg/dL / Ketone: x  / Bili: x / Urobili: x   Blood: x / Protein: x / Nitrite: x   Leuk Esterase: x / RBC: x / WBC x   Sq Epi: x / Non Sq Epi: x / Bacteria: x        RADIOLOGY & ADDITIONAL TESTS:  < from: TTE Echo w/ Contrast Follow Up Limited (09.29.24 @ 14:42) >  Procedure:   Follow up or Limited Echocardiogram and Echocardiogram with               Definity Contrast.  Indications: I71.9 - Aortic Aneurysm of unspecified Site, without Rupture  Diagnosis:I71.9 - Aortic aneurysm of unspecified site, without rupture        Summary:   1. Low normal global left ventricular systolic function.   2. Multiple left ventricular regional wall motion abnormalities exist.   See wall motion findings.   3. LV Ejection Fraction by Jose's Method with a biplane EF of 56 %.   4. No evidence of LV apical thrombus.   5. Endocardial visualization was enhanced with intravenous echo contrast.   6. Apical wall motion abnormalities appear stable compared to prior   studies.    PHYSICIAN INTERPRETATION:  Left Ventricle: Endocardial visualization was enhanced with intravenous   echo contrast. Global LV systolic function was normal. No evidence of LV   apical thrombus.      LV Wall Scoring:  The apical lateral segment and apex are akinetic. The apical septal   segment,  apical anterior segment, and apical inferior segment are hypokinetic. All  remaining scored segments are normal.      < end of copied text >

## 2024-10-06 NOTE — PROGRESS NOTE ADULT - SUBJECTIVE AND OBJECTIVE BOX
Nephrology progress note    THIS IS AN INCOMPLETE NOTE . FULL NOTE TO FOLLOW SHORTLY    Patient is seen and examined, events over the last 24 h noted .    Allergies:  No Known Allergies    Hospital Medications:   MEDICATIONS  (STANDING):  aspirin  chewable 81 milliGRAM(s) Oral daily  atorvastatin 40 milliGRAM(s) Oral at bedtime  chlorhexidine 2% Cloths 1 Application(s) Topical <User Schedule>  clopidogrel Tablet 75 milliGRAM(s) Oral daily  cyanocobalamin 1000 MICROGram(s) Oral daily  darbepoetin Injectable Syringe 50 MICROGram(s) IV Push <User Schedule>  dextrose 5%. 1000 milliLiter(s) (100 mL/Hr) IV Continuous <Continuous>  dextrose 5%. 1000 milliLiter(s) (50 mL/Hr) IV Continuous <Continuous>  dextrose 50% Injectable 25 Gram(s) IV Push once  dextrose 50% Injectable 12.5 Gram(s) IV Push once  dextrose 50% Injectable 25 Gram(s) IV Push once  folic acid 1 milliGRAM(s) Oral daily  glucagon  Injectable 1 milliGRAM(s) IntraMuscular once  heparin   Injectable 5000 Unit(s) SubCutaneous every 8 hours  influenza  Vaccine (HIGH DOSE) 0.5 milliLiter(s) IntraMuscular once  insulin lispro Injectable (ADMELOG) 3 Unit(s) SubCutaneous before breakfast  insulin lispro Injectable (ADMELOG) 3 Unit(s) SubCutaneous before dinner  metoclopramide 5 milliGRAM(s) Oral three times a day  metoprolol succinate ER 25 milliGRAM(s) Oral daily  pantoprazole    Tablet 40 milliGRAM(s) Oral before breakfast  senna 2 Tablet(s) Oral at bedtime  tamsulosin 0.4 milliGRAM(s) Oral at bedtime        VITALS:  T(F): 97.6 (10-06-24 @ 00:00), Max: 97.6 (10-06-24 @ 00:00)  HR: 74 (10-06-24 @ 05:15)  BP: 130/71 (10-06-24 @ 05:15)  RR: 18 (10-06-24 @ 00:00)  SpO2: 99% (10-06-24 @ 00:00)  Wt(kg): --    10-05 @ 07:01  -  10-06 @ 07:00  --------------------------------------------------------  IN: 200 mL / OUT: 1000 mL / NET: -800 mL          PHYSICAL EXAM:  Constitutional: NAD  HEENT: anicteric sclera, oropharynx clear, MMM  Neck: No JVD  Respiratory: CTAB, no wheezes, rales or rhonchi  Cardiovascular: S1, S2, RRR  Gastrointestinal: BS+, soft, NT/ND  Extremities: No cyanosis or clubbing. No peripheral edema  :  No schneider.   Skin: No rashes    LABS:  10-05    133[L]  |  94[L]  |  32[H]  ----------------------------<  147[H]  4.2   |  30  |  4.8[HH]    Ca    7.8[L]      05 Oct 2024 06:01    TPro  6.0  /  Alb  2.7[L]  /  TBili  0.4  /  DBili      /  AST  6   /  ALT  <5  /  AlkPhos  170[H]  10-05                          7.5    4.56  )-----------( 250      ( 05 Oct 2024 06:01 )             24.0       Urine Studies:  Urinalysis Basic - ( 05 Oct 2024 06:01 )    Color:  / Appearance:  / SG:  / pH:   Gluc: 147 mg/dL / Ketone:   / Bili:  / Urobili:    Blood:  / Protein:  / Nitrite:    Leuk Esterase:  / RBC:  / WBC    Sq Epi:  / Non Sq Epi:  / Bacteria:           Iron 93, TIBC --, %sat --      [10-01-24 @ 07:00]  Ferritin 1129      [10-01-24 @ 07:00]  PTH -- (Ca 7.8)      [09-24-24 @ 06:56]   135  Vitamin D (25OH) 15      [09-25-24 @ 06:49]  HbA1c 5.8      [01-30-20 @ 06:46]    HBsAb <3.0      [12-29-19 @ 17:44]  HBsAb Nonreact      [11-03-22 @ 06:40]  HBsAg Nonreact      [11-03-22 @ 06:40]  HBcAb Nonreact      [11-03-22 @ 06:40]  HCV 0.14, Nonreact      [10-27-22 @ 04:09]        RADIOLOGY & ADDITIONAL STUDIES:   Nephrology progress note    Patient is seen and examined, events over the last 24 h noted .  Lying in bed comfortable     Allergies:  No Known Allergies    Hospital Medications:   MEDICATIONS  (STANDING):  aspirin  chewable 81 milliGRAM(s) Oral daily  atorvastatin 40 milliGRAM(s) Oral at bedtime  clopidogrel Tablet 75 milliGRAM(s) Oral daily  cyanocobalamin 1000 MICROGram(s) Oral daily  darbepoetin Injectable Syringe 50 MICROGram(s) IV Push <User Schedule>  folic acid 1 milliGRAM(s) Oral daily  glucagon  Injectable 1 milliGRAM(s) IntraMuscular once  heparin   Injectable 5000 Unit(s) SubCutaneous every 8 hours  influenza  Vaccine (HIGH DOSE) 0.5 milliLiter(s) IntraMuscular once  insulin lispro Injectable (ADMELOG) 3 Unit(s) SubCutaneous before breakfast  insulin lispro Injectable (ADMELOG) 3 Unit(s) SubCutaneous before dinner  metoclopramide 5 milliGRAM(s) Oral three times a day  metoprolol succinate ER 25 milliGRAM(s) Oral daily  pantoprazole    Tablet 40 milliGRAM(s) Oral before breakfast  senna 2 Tablet(s) Oral at bedtime  tamsulosin 0.4 milliGRAM(s) Oral at bedtime        VITALS:  T(F): 97.6 (10-06-24 @ 00:00), Max: 97.6 (10-06-24 @ 00:00)  HR: 74 (10-06-24 @ 05:15)  BP: 130/71 (10-06-24 @ 05:15)  RR: 18 (10-06-24 @ 00:00)  SpO2: 99% (10-06-24 @ 00:00)      10-05 @ 07:01  -  10-06 @ 07:00  --------------------------------------------------------  IN: 200 mL / OUT: 1000 mL / NET: -800 mL          PHYSICAL EXAM:  Constitutional: NAD pale   Respiratory: CTAB  Cardiovascular: S1, S2, RRR  Gastrointestinal: BS+, soft, NT/ND  Extremities: No cyanosis or clubbing. No peripheral edema  :  No schneider.   Skin: No rashes    LABS:  10-05    133[L]  |  94[L]  |  32[H]  ----------------------------<  147[H]  4.2   |  30  |  4.8[HH]    Ca    7.8[L]      05 Oct 2024 06:01    TPro  6.0  /  Alb  2.7[L]  /  TBili  0.4  /  DBili      /  AST  6   /  ALT  <5  /  AlkPhos  170[H]  10-05                          7.5    4.56  )-----------( 250      ( 05 Oct 2024 06:01 )             24.0     Hemoglobin: 7.5 g/dL (10-05 @ 06:01)  Hemoglobin: 7.9 g/dL (10-04 @ 06:43)  Hemoglobin: 8.0 g/dL (10-03 @ 16:44)    Urine Studies:  Urinalysis Basic - ( 05 Oct 2024 06:01 )    Color:  / Appearance:  / SG:  / pH:   Gluc: 147 mg/dL / Ketone:   / Bili:  / Urobili:    Blood:  / Protein:  / Nitrite:    Leuk Esterase:  / RBC:  / WBC    Sq Epi:  / Non Sq Epi:  / Bacteria:           Iron 93, TIBC --, %sat --      [10-01-24 @ 07:00]  Ferritin 1129      [10-01-24 @ 07:00]  PTH -- (Ca 7.8)      [09-24-24 @ 06:56]   135  Vitamin D (25OH) 15      [09-25-24 @ 06:49]  HbA1c 5.8      [01-30-20 @ 06:46]    HBsAb <3.0      [12-29-19 @ 17:44]  HBsAb Nonreact      [11-03-22 @ 06:40]  HBsAg Nonreact      [11-03-22 @ 06:40]  HBcAb Nonreact      [11-03-22 @ 06:40]  HCV 0.14, Nonreact      [10-27-22 @ 04:09]        RADIOLOGY & ADDITIONAL STUDIES:

## 2024-10-06 NOTE — PROGRESS NOTE ADULT - SUBJECTIVE AND OBJECTIVE BOX
24H events:    Patient is a 64y old Male who presents with a chief complaint of N/V (04 Oct 2024 10:50)    Primary diagnosis of Abdominal distention        Today is 13d of hospitalization. This morning patient was seen and examined at bedside, resting comfortably in bed.    No acute or major events overnight.      PAST MEDICAL & SURGICAL HISTORY  S/P CABG (coronary artery bypass graft)  x4    Diabetes mellitus    Transient ischemic attack (TIA)  2017; 2008    Chronic kidney disease (CKD)  Stage IV    Hypertension    Stented coronary artery  in 2008    Myocardial infarction  2012    PAD (peripheral artery disease)  S/p bypass left leg    HLD (hyperlipidemia)    BPH (benign prostatic hyperplasia)    Pain in left knee  s/p fall    OA (osteoarthritis)    [place] (hard of hearing)    Chronic anemia    History of medical problems  left arm precaution    S/P CABG (coronary artery bypass graft)  2012    H/O arterial bypass of lower limb  Left Lower Extremity (2016)    History of surgery  Left CEA (2017)  Left Pinkie toe Amputation (2014)  CABG x 4 (2012)  Card cath - stent (2008)      AV fistula  2019  LEFT AV FISTULA      SOCIAL HISTORY:  Social History:      ALLERGIES:  No Known Allergies    MEDICATIONS:  STANDING MEDICATIONS  aspirin  chewable 81 milliGRAM(s) Oral daily  atorvastatin 40 milliGRAM(s) Oral at bedtime  chlorhexidine 2% Cloths 1 Application(s) Topical <User Schedule>  clopidogrel Tablet 75 milliGRAM(s) Oral daily  cyanocobalamin 1000 MICROGram(s) Oral daily  darbepoetin Injectable Syringe 50 MICROGram(s) IV Push <User Schedule>  dextrose 5%. 1000 milliLiter(s) IV Continuous <Continuous>  dextrose 5%. 1000 milliLiter(s) IV Continuous <Continuous>  dextrose 50% Injectable 25 Gram(s) IV Push once  dextrose 50% Injectable 12.5 Gram(s) IV Push once  dextrose 50% Injectable 25 Gram(s) IV Push once  folic acid 1 milliGRAM(s) Oral daily  glucagon  Injectable 1 milliGRAM(s) IntraMuscular once  heparin   Injectable 5000 Unit(s) SubCutaneous every 8 hours  influenza  Vaccine (HIGH DOSE) 0.5 milliLiter(s) IntraMuscular once  insulin lispro Injectable (ADMELOG) 3 Unit(s) SubCutaneous before dinner  insulin lispro Injectable (ADMELOG) 3 Unit(s) SubCutaneous before breakfast  metoclopramide 5 milliGRAM(s) Oral three times a day  metoprolol succinate ER 25 milliGRAM(s) Oral daily  pantoprazole    Tablet 40 milliGRAM(s) Oral before breakfast  senna 2 Tablet(s) Oral at bedtime  tamsulosin 0.4 milliGRAM(s) Oral at bedtime    PRN MEDICATIONS  acetaminophen     Tablet .. 650 milliGRAM(s) Oral every 6 hours PRN  artificial  tears Solution 1 Drop(s) Both EYES every 2 hours PRN  dextrose Oral Gel 15 Gram(s) Oral once PRN  ondansetron Injectable 4 milliGRAM(s) IV Push every 6 hours PRN    VITALS:   T(F): 97.4  HR: 78  BP: 98/60  RR: 18  SpO2: 97%    PHYSICAL EXAM:  GENERAL:   ( x) NAD, lying in bed comfortably     (  ) obtunded     (  ) lethargic     (  ) somnolent      NECK:  (x) Supple     (  ) neck stiffness     (  ) nuchal rigidity     (  )  no JVD     (  ) JVD present ( -- cm)    HEART:  Rate -->     (x) normal rate     (  ) bradycardic     (  ) tachycardic  Rhythm -->     (x) regular     (  ) regularly irregular     (  ) irregularly irregular  Murmurs -->     (x) normal s1s2     (  ) systolic murmur     (  ) diastolic murmur     (  ) continuous murmur      (  ) S3 present     (  ) S4 present    LUNGS:   ( x)Unlabored respirations     (  ) tachypnea  ( x) B/L air entry     (  ) decreased breath sounds in:  (location     )    ( x) no adventitious sound     (  ) crackles     (  ) wheezing      (  ) rhonchi      (specify location:       )  (  ) chest wall tenderness (specify location:       )    ABDOMEN:   ( x) Soft     (  ) tense   |   (  ) nondistended     (  ) distended   |   (  ) +BS     (  ) hypoactive bowel sounds     (  ) hyperactive bowel sounds  ( x) nontender     (  ) RUQ tenderness     (  ) RLQ tenderness     (  ) LLQ tenderness     (  ) epigastric tenderness     (  ) diffuse tenderness  (  ) Splenomegaly      (  ) Hepatomegaly      (  ) Jaundice     (  ) ecchymosis     EXTREMITIES:  ( x) Normal     (  ) Rash     (  ) ecchymosis     (  ) varicose veins      (  ) pitting edema     (  ) non-pitting edema   (  ) ulceration     (  ) gangrene:     (location:     )    NERVOUS SYSTEM:    ( ) A&Ox3     (  ) confused     (  ) lethargic  CN II-XII:     ( x) Intact     (  ) deficits found     (Specify:     )   Upper extremities:     (  ) no sensorimotor deficits     (  ) weakness     (  ) loss of proprioception/vibration     (  ) loss of touch/temperature (specify:    )  Lower extremities:     (  ) no sensorimotor deficits     (  ) weakness     (  ) loss of proprioception/vibration     (  ) loss of touch/temperature (specify:    )    SKIN:   (  ) No rashes or lesions     (  ) maculopapular rash     (  ) pustules     (  ) vesicles     (  ) ulcer     (  ) ecchymosis     (specify location:     )      LABS:                        7.5    4.56  )-----------( 250      ( 05 Oct 2024 06:01 )             24.0     10-05    133[L]  |  94[L]  |  32[H]  ----------------------------<  147[H]  4.2   |  30  |  4.8[HH]    Ca    7.8[L]      05 Oct 2024 06:01    TPro  6.0  /  Alb  2.7[L]  /  TBili  0.4  /  DBili  x   /  AST  6   /  ALT  <5  /  AlkPhos  170[H]  10-05      Urinalysis Basic - ( 05 Oct 2024 06:01 )    Color: x / Appearance: x / SG: x / pH: x  Gluc: 147 mg/dL / Ketone: x  / Bili: x / Urobili: x   Blood: x / Protein: x / Nitrite: x   Leuk Esterase: x / RBC: x / WBC x   Sq Epi: x / Non Sq Epi: x / Bacteria: x                    ASSESSMENT AND PLAN  64 year old man from [facility] with a past medical history of ESRD on hemodialysis [facility] (last dialysis on 9/20/24), NIDDM, BPH, DM, CAD s/p  CABG, HTN, CHF, TIA, PAD, left toe ulcer s/p amputation sent in by PCP (Dr. Olivier) for increasing abdomen distention over the last 3 weeks. Patient with increase abdominal girth despite weight loss. Patient's weight done 20 lbs in the last month    Nausea, Vomiting improved, still occurs after meals  Ascites : Likely Nephrogenic Ascites, No evidence of Cirrhosis  - GI / Hepatology evaluation noted  - paracentesis done with removal of 4 liters 9/25, 6 liters on 9/27, & 5.5 liters 10/4  - no evidence of SBP  - positive Abnormality : delayed emptying : gastroparesis  - started on Reglan    ESRD  - continue HMD  - weight continues trending down?  - asked nurse to document weight  - leg edema resolved  - need daily weights    CAD, post CABG, CHF  on rx  - 2d echo results    DM type 2 with neurologic, ophth, vascular, and renal manifestions  PAD, hx of toe amputation  retinopathy  gastroparesis, poly neuropathy  - podiatry follows as outpatient      GI prophylaxis   DVT Heparin  Diet resumed, encourage small meals    Patient remains acute. Continue to monitor weight. Reviewed SNF chart, weight down from 226 lbs last month.  D/C planning back to [facility]. Financial reasons may hinder discharge. Patient to sign agreement on Monday

## 2024-10-06 NOTE — PROGRESS NOTE ADULT - ASSESSMENT
64-year-old man with a past medical history of ESRD on hemodialysis MWF (last dialysis on 9/20/24), NIDDM, BPH,DM, CAD s/p  CABG, HTN, CHF, TIA, PAD, left toe ulcer s/p amputation sent in by PCP (Dr. Olivier) for increasing abdomen distention over the last 3 weeks.  HD on Tuesday    last phos noted, no binders   s/p paracentesis with 4 liters fluid removal 9/25 and 10/4 followed by hepatology / GI    bp noted better    last h/h noted  on RAMSEY  / ferritin on the high side/ sat pending / transfuse if Hb < 7   will follow

## 2024-10-06 NOTE — PROGRESS NOTE ADULT - ASSESSMENT
64 year old man from SNF with a past medical history of ESRD on hemodialysis [facility] (last dialysis on 9/20/24), NIDDM, BPH, DM, CAD s/p  CABG, HTN, CHF, TIA, PAD, left toe ulcer s/p amputation sent in by PCP (Dr. Olivier) for increasing abdomen distention over the last 3 weeks. Patient with increase abdominal girth despite weight loss. Patient's weight done 20 lbs in the last month    Nausea, Vomiting improved, still occurs after meals  Ascites : Likely Nephrogenic Ascites, No evidence of Cirrhosis  - GI / Hepatology evaluation noted  - paracentesis done with removal of 4 liters 9/25, 6 liters on 9/27, & 5.5 liters 10/4  - no evidence of SBP  - positive Abnormality : delayed emptying : gastroparesis  - started on Reglan    ESRD  - continue HMD  - weight continues trending down?  - asked nurse to document weight  - leg edema resolved  - need daily weights    CAD, post CABG, CHF  on rx  - 2d echo results    DM type 2 with neurologic, ophth, vascular, and renal manifestions  PAD, hx of toe amputation  retinopathy  gastroparesis, poly neuropathy  - podiatry follows as outpatient      GI prophylaxis   DVT Heparin  Diet resumed, encourage small meals    Patient remains acute. Continue to monitor weight. Reviewed SNF chart, weight down from 226 lbs last month.  D/C planning back to [facility].  Patient to sign adm agreement on Monday

## 2024-10-07 ENCOUNTER — TRANSCRIPTION ENCOUNTER (OUTPATIENT)
Age: 64
End: 2024-10-07

## 2024-10-07 VITALS
RESPIRATION RATE: 18 BRPM | HEART RATE: 80 BPM | OXYGEN SATURATION: 97 % | SYSTOLIC BLOOD PRESSURE: 112 MMHG | TEMPERATURE: 98 F | DIASTOLIC BLOOD PRESSURE: 68 MMHG

## 2024-10-07 LAB
GLUCOSE BLDC GLUCOMTR-MCNC: 120 MG/DL — HIGH (ref 70–99)
GLUCOSE BLDC GLUCOMTR-MCNC: 154 MG/DL — HIGH (ref 70–99)
GLUCOSE BLDC GLUCOMTR-MCNC: 172 MG/DL — HIGH (ref 70–99)

## 2024-10-07 RX ORDER — SPIRONOLACTONE 100 MG/1
1 TABLET, FILM COATED ORAL
Refills: 0 | DISCHARGE

## 2024-10-07 RX ORDER — METOCLOPRAMIDE HCL 5 MG
1 TABLET ORAL
Qty: 0 | Refills: 0 | DISCHARGE
Start: 2024-10-07

## 2024-10-07 RX ORDER — BENZONATATE 150 MG/1
100 CAPSULE ORAL THREE TIMES A DAY
Refills: 0 | Status: DISCONTINUED | OUTPATIENT
Start: 2024-10-07 | End: 2024-10-07

## 2024-10-07 RX ADMIN — Medication 81 MILLIGRAM(S): at 11:50

## 2024-10-07 RX ADMIN — Medication 5 MILLIGRAM(S): at 05:57

## 2024-10-07 RX ADMIN — Medication 5000 UNIT(S): at 13:50

## 2024-10-07 RX ADMIN — PANTOPRAZOLE SODIUM 40 MILLIGRAM(S): 40 TABLET, DELAYED RELEASE ORAL at 05:57

## 2024-10-07 RX ADMIN — Medication 5 MILLIGRAM(S): at 13:49

## 2024-10-07 RX ADMIN — Medication 3 UNIT(S): at 07:50

## 2024-10-07 RX ADMIN — BENZONATATE 100 MILLIGRAM(S): 150 CAPSULE ORAL at 16:53

## 2024-10-07 RX ADMIN — Medication 75 MILLIGRAM(S): at 11:50

## 2024-10-07 RX ADMIN — FOLIC ACID 1 MILLIGRAM(S): 1 TABLET ORAL at 11:50

## 2024-10-07 RX ADMIN — Medication 4 MILLIGRAM(S): at 01:09

## 2024-10-07 RX ADMIN — CHLORHEXIDINE GLUCONATE ORAL RINSE 1 APPLICATION(S): 1.2 SOLUTION DENTAL at 05:55

## 2024-10-07 RX ADMIN — Medication 25 MILLIGRAM(S): at 05:57

## 2024-10-07 RX ADMIN — Medication 1000 MICROGRAM(S): at 11:50

## 2024-10-07 RX ADMIN — Medication 3 UNIT(S): at 16:53

## 2024-10-07 RX ADMIN — Medication 5000 UNIT(S): at 05:58

## 2024-10-07 NOTE — DISCHARGE NOTE PROVIDER - CARE PROVIDER_API CALL
Shannon Alicia  Internal Medicine  [address], [place] [address]  Phone: [phone]  Fax: [phone]  Follow Up Time: 2 weeks    Ramakrishna Moya  Gastroenterology  [address] [place] [id number]  Phone: [phone]  Fax: [phone]  Follow Up Time: 2 weeks   chest pain

## 2024-10-07 NOTE — PROGRESS NOTE ADULT - ASSESSMENT
64 year old man from SNF with a past medical history of ESRD on hemodialysis [facility] (last dialysis on 9/20/24), NIDDM, BPH, DM, CAD s/p  CABG, HTN, CHF, TIA, PAD, left toe ulcer s/p amputation sent in by PCP (Dr. Olivier) for increasing abdomen distention over the last 3 weeks. Patient with increase abdominal girth despite weight loss. Patient's weight done 20 lbs in the last month    Nausea, Vomiting improved, still occurs after meals  Ascites : Likely Nephrogenic Ascites, No evidence of Cirrhosis  - GI / Hepatology evaluation noted  - paracentesis done with removal of 4 liters 9/25, 6 liters on 9/27, & 5.5 liters 10/4  - no evidence of SBP  - positive Abnormality : delayed emptying : gastroparesis  - started on Reglan    ESRD  - continue HMD  - weight continues trending down?  - asked nurse to document weight  - leg edema resolved  - need daily weights    CAD, post CABG, CHF  on rx  - 2d echo results    DM type 2 with neurologic, ophth, vascular, and renal manifestions  PAD, hx of toe amputation  retinopathy  gastroparesis, poly neuropathy  - podiatry follows as outpatient      GI prophylaxis   DVT Heparin  Diet resumed, encourage small meals    Patient remains acute. Continue to monitor weight. Reviewed SNF chart, weight down from 226 lbs last month.  D/C planning back to [facility].  Patient to sign adm agreement on Monday     64 year old man from SNF with a past medical history of ESRD on hemodialysis [facility] (last dialysis on 9/20/24), NIDDM, BPH, DM, CAD s/p  CABG, HTN, CHF, TIA, PAD, left toe ulcer s/p amputation sent in by PCP (Dr. Olivier) for increasing abdomen distention over the last 3 weeks. Patient with increase abdominal girth despite weight loss. Patient's weight done 20 lbs in the last month    Nausea, Vomiting improved, still occurs after meals  Ascites : Likely Nephrogenic Ascites, No evidence of Cirrhosis  - GI / Hepatology evaluation noted  - paracentesis done with removal of 4 liters 9/25, 6 liters on 9/27, & 5.5 liters 10/4  - no evidence of SBP  - positive Abnormality : delayed emptying : gastroparesis  - started on Reglan  - needs GI F/U post DC    ESRD  - continue HMD  - weight continues trending down?  - asked nurse to document weight  - leg edema resolved  - need daily weights    CAD, post CABG, CHF  on rx  - 2d echo results    DM type 2 with neurologic, ophth, vascular, and renal manifestions  PAD, hx of toe amputation  retinopathy  gastroparesis, poly neuropathy  - podiatry follows as outpatient      GI prophylaxis   DVT Heparin  Diet resumed, encourage small meals    Patient remains acute. Continue to monitor weight. Reviewed SNF chart, weight down from 226 lbs last month.  D/C planning back to [facility].  Patient to sign adm agreement today- then may return to SNF.

## 2024-10-07 NOTE — PROGRESS NOTE ADULT - SUBJECTIVE AND OBJECTIVE BOX
seen and examined  24 h events noted   no distress         PAST HISTORY  --------------------------------------------------------------------------------  No significant changes to PMH, PSH, FHx, SHx, unless otherwise noted    ALLERGIES & MEDICATIONS  --------------------------------------------------------------------------------  Allergies    No Known Allergies    Intolerances      Standing Inpatient Medications  aspirin  chewable 81 milliGRAM(s) Oral daily  atorvastatin 40 milliGRAM(s) Oral at bedtime  chlorhexidine 2% Cloths 1 Application(s) Topical <User Schedule>  clopidogrel Tablet 75 milliGRAM(s) Oral daily  cyanocobalamin 1000 MICROGram(s) Oral daily  darbepoetin Injectable Syringe 50 MICROGram(s) IV Push <User Schedule>  dextrose 5%. 1000 milliLiter(s) IV Continuous <Continuous>  dextrose 5%. 1000 milliLiter(s) IV Continuous <Continuous>  dextrose 50% Injectable 25 Gram(s) IV Push once  dextrose 50% Injectable 12.5 Gram(s) IV Push once  dextrose 50% Injectable 25 Gram(s) IV Push once  folic acid 1 milliGRAM(s) Oral daily  glucagon  Injectable 1 milliGRAM(s) IntraMuscular once  heparin   Injectable 5000 Unit(s) SubCutaneous every 8 hours  influenza  Vaccine (HIGH DOSE) 0.5 milliLiter(s) IntraMuscular once  insulin lispro Injectable (ADMELOG) 3 Unit(s) SubCutaneous before dinner  insulin lispro Injectable (ADMELOG) 3 Unit(s) SubCutaneous before breakfast  metoclopramide 5 milliGRAM(s) Oral three times a day  metoprolol succinate ER 25 milliGRAM(s) Oral daily  pantoprazole    Tablet 40 milliGRAM(s) Oral before breakfast  senna 2 Tablet(s) Oral at bedtime  tamsulosin 0.4 milliGRAM(s) Oral at bedtime    PRN Inpatient Medications  acetaminophen     Tablet .. 650 milliGRAM(s) Oral every 6 hours PRN  artificial  tears Solution 1 Drop(s) Both EYES every 2 hours PRN  dextrose Oral Gel 15 Gram(s) Oral once PRN  ondansetron Injectable 4 milliGRAM(s) IV Push every 6 hours PRN        VITALS/PHYSICAL EXAM  --------------------------------------------------------------------------------  T(C): 36.8 (10-07-24 @ 07:56), Max: 36.8 (10-07-24 @ 07:56)  HR: 80 (10-07-24 @ 07:56) (73 - 80)  BP: 112/68 (10-07-24 @ 07:56) (112/68 - 146/62)  RR: 18 (10-07-24 @ 07:56) (17 - 18)  SpO2: 97% (10-07-24 @ 07:56) (97% - 98%)  Wt(kg): --        Physical Exam:  	Gen: NAD  	Pulm: CTA B/L  	CV:  S1S2; no rub  	Abd: +distended  	LE:dressing   	Vascular access: av    LABS/STUDIES  --------------------------------------------------------------------------------            Creatinine Trend:  SCr 4.8 [10-05 @ 06:01]  SCr 4.5 [10-04 @ 06:43]  SCr 3.7 [10-03 @ 16:44]  SCr 5.3 [09-30 @ 06:28]  SCr 4.4 [09-29 @ 06:20]    Urinalysis - [10-05-24 @ 06:01]      Color  / Appearance  / SG  / pH       Gluc 147 / Ketone   / Bili  / Urobili        Blood  / Protein  / Leuk Est  / Nitrite       RBC  / WBC  / Hyaline  / Gran  / Sq Epi  / Non Sq Epi  / Bacteria       Iron 93, TIBC --, %sat --      [10-01-24 @ 07:00]  Ferritin 1129      [10-01-24 @ 07:00]  PTH -- (Ca 7.8)      [09-24-24 @ 06:56]   135  Vitamin D (25OH) 15      [09-25-24 @ 06:49]  HbA1c 5.8      [01-30-20 @ 06:46]

## 2024-10-07 NOTE — DISCHARGE NOTE NURSING/CASE MANAGEMENT/SOCIAL WORK - PATIENT PORTAL LINK FT
You can access the FollowMyHealth Patient Portal offered by [facility] by registering at the following website: [email or web address]. By joining [facility]’s FollowMyHealth portal, you will also be able to view your health information using other applications (apps) compatible with our system.

## 2024-10-07 NOTE — PROGRESS NOTE ADULT - TIME BILLING
spending 30 minutes on this patient's case: 9     minutes w patient, 11   minutes reviewing the chart,    and  10 minutes speaking to the HO, RN and family, also coordinating care and documenting all.

## 2024-10-07 NOTE — PROVIDER CONTACT NOTE (OTHER) - SITUATION
MD Olivarez this afternoon made aware of orthostats, BP 85/43. Episodes of vomiting accompanied with coughing overnight.

## 2024-10-07 NOTE — DISCHARGE NOTE PROVIDER - HOSPITAL COURSE
64 year old man from SNF with a past medical history of ESRD on hemodialysis [facility] (last dialysis on 9/20/24), NIDDM, BPH, DM, CAD s/p  CABG, HTN, CHF, TIA, PAD, left toe ulcer s/p amputation sent in by PCP (Dr. Olivier) for increasing abdomen distention over the last 3 weeks. Patient with increase abdominal girth despite weight loss. Patient's weight done 20 lbs in the last month    Nausea, Vomiting improved, still occurs after meals  Ascites : Likely Nephrogenic Ascites, No evidence of Cirrhosis  - GI / Hepatology evaluation noted  - paracentesis done with removal of 4 liters 9/25, 6 liters on 9/27, & 5.5 liters 10/4  - no evidence of SBP  - positive Abnormality : delayed emptying : gastroparesis  - started on Reglan  - needs GI F/U post DC    Orthostatic Hypotension  Vimal Stockings  -holding home lasix, nifedipine, aldactone    ESRD  - continue HD    CAD, post CABG, CHF  on rx  - echo - 9/29-  1. Low normal global left ventricular systolic function.   2. Multiple left ventricular regional wall motion abnormalities exist.   See wall motion findings.   3. LV Ejection Fraction by Jose's Method with a biplane EF of 56 %.   4. No evidence of LV apical thrombus.   5. Endocardial visualization was enhanced with intravenous echo contrast.   6. Apical wall motion abnormalities appear stable compared to prior   studies.      DM type 2 with neurologic, optho, vascular, and renal manifestations  PAD, hx of toe amputation  retinopathy  gastroparesis, poly neuropathy  - podiatry follows as outpatient    Discussion of discharge plan of care, including discharge diagnoses, medication reconciliation, and follow-ups was conducted with the attending doctor on the date of discharge, and discharge was approved.

## 2024-10-07 NOTE — PROGRESS NOTE ADULT - ASSESSMENT
64-year-old man with a past medical history of ESRD on hemodialysis MWF (last dialysis on 9/20/24), NIDDM, BPH,DM, CAD s/p  CABG, HTN, CHF, TIA, PAD, left toe ulcer s/p amputation sent in by PCP (Dr. Olivier) for increasing abdomen distention over the last 3 weeks.  HD in am   increase uf goals with HD    last phos noted, no binders   s/p paracentesis with 4 liters fluid removal 9/25 and 10/4 followed by hepatology / GI    last h/h noted  on RAMSEY  / ferritin on the high side/ sat pending / transfuse if Hb < 7   will follow    ? d/c plan

## 2024-10-07 NOTE — PROGRESS NOTE ADULT - PROVIDER SPECIALTY LIST ADULT
Gastroenterology
Hepatology
Internal Medicine
Nephrology
Gastroenterology
Hepatology
Internal Medicine
Nephrology
Internal Medicine
Nephrology
Internal Medicine

## 2024-10-07 NOTE — PROGRESS NOTE ADULT - SUBJECTIVE AND OBJECTIVE BOX
Chart reviewed, patient examined. Pertinent results reviewed.  Case discussed with HO; specialist f/u reviewed  HD#[id number]    SCHWABACHER, LAWRENCE    HPI:  Pt is a 64-year-old man with a past medical history of ESRD on hemodialysis MWF (last dialysis on 9/20/24), NIDDM, BPH,DM, CAD s/p  CABG, HTN, CHF, TIA, PAD, left toe ulcer s/p amputation sent in by PCP (Dr. Olivier) for increasing abdomen distention over the last 3 weeks. Patient reported that he developed new onset abdominal distention over the past 3 weeks that is now causing him abdominal pain and difficulty having PO intake with vomiting. He reported that he had to vomit small amounts of food if he eats. He also reported being constipated over the past 2 days. He denied any hematemesis, sob, chest pain, orthopnea, blood in stools, swelling of the legs, fever or chills, He is aneuric and does not produce urine. He denied any history of smoking or excessive alcohol use. Patient presented to the ED on 9/16 and underwent a C, he was discharged on the same day without further work-up. Patient is currently in SNF.        INTERVAL EVENTS: Patient seen this am without distress    MEDICATIONS  (STANDING):  aspirin  chewable 81 milliGRAM(s) Oral daily  atorvastatin 40 milliGRAM(s) Oral at bedtime  chlorhexidine 2% Cloths 1 Application(s) Topical <User Schedule>  clopidogrel Tablet 75 milliGRAM(s) Oral daily  cyanocobalamin 1000 MICROGram(s) Oral daily  darbepoetin Injectable Syringe 50 MICROGram(s) IV Push <User Schedule>  dextrose 5%. 1000 milliLiter(s) (100 mL/Hr) IV Continuous <Continuous>  dextrose 5%. 1000 milliLiter(s) (50 mL/Hr) IV Continuous <Continuous>  dextrose 50% Injectable 25 Gram(s) IV Push once  dextrose 50% Injectable 12.5 Gram(s) IV Push once  dextrose 50% Injectable 25 Gram(s) IV Push once  folic acid 1 milliGRAM(s) Oral daily  glucagon  Injectable 1 milliGRAM(s) IntraMuscular once  heparin   Injectable 5000 Unit(s) SubCutaneous every 8 hours  influenza  Vaccine (HIGH DOSE) 0.5 milliLiter(s) IntraMuscular once  insulin lispro Injectable (ADMELOG) 3 Unit(s) SubCutaneous before dinner  insulin lispro Injectable (ADMELOG) 3 Unit(s) SubCutaneous before breakfast  metoclopramide 5 milliGRAM(s) Oral three times a day  metoprolol succinate ER 25 milliGRAM(s) Oral daily  pantoprazole    Tablet 40 milliGRAM(s) Oral before breakfast  senna 2 Tablet(s) Oral at bedtime  tamsulosin 0.4 milliGRAM(s) Oral at bedtime    MEDICATIONS  (PRN):  acetaminophen     Tablet .. 650 milliGRAM(s) Oral every 6 hours PRN Mild Pain (1 - 3)  artificial  tears Solution 1 Drop(s) Both EYES every 2 hours PRN Dry Eyes  dextrose Oral Gel 15 Gram(s) Oral once PRN Blood Glucose LESS THAN 70 milliGRAM(s)/deciliter  ondansetron Injectable 4 milliGRAM(s) IV Push every 6 hours PRN Nausea and/or Vomiting      Vital Signs Last 24 Hrs  T(C): 36.8 (07 Oct 2024 07:56), Max: 36.8 (07 Oct 2024 07:56)  T(F): 98.2 (07 Oct 2024 07:56), Max: 98.2 (07 Oct 2024 07:56)  HR: 80 (07 Oct 2024 07:56) (73 - 80)  BP: 112/68 (07 Oct 2024 07:56) (98/60 - 146/62)  BP(mean): --  RR: 18 (07 Oct 2024 07:56) (17 - 18)  SpO2: 97% (07 Oct 2024 07:56) (97% - 98%)    Parameters below as of 07 Oct 2024 07:56  Patient On (Oxygen Delivery Method): room air  GEN: No acute distress; NETTA; pale; supine in bed; AAOx4; weak and tired  H: AT/NC; ; TH: MMM; fair dentition  LUNGS: Clear to auscultation  HT: sternotomy scar; RRR, no MRG   ABD:  +2 distended- NT;  soft- no HSM or Mass; + UMB hernia  EXT: No pedal edema- legs wrapped w UNNA boots.  NEURO: AAOX4; Gen weak; legs mi-mod weak and atrophic      LABS:                          7.5    4.56  )-----------( 250      ( 05 Oct 2024 06:01 )             24.0             10-05    133[L]  |  94[L]  |  32[H]  ----------------------------<  147[H]  4.2   |  30  |  4.8[HH]    Ca    7.8[L]      05 Oct 2024 06:01    TPro  6.0  /  Alb  2.7[L]  /  TBili  0.4  /  DBili  x   /  AST  6   /  ALT  <5  /  AlkPhos  170[H]  10-05    LIVER FUNCTIONS - ( 05 Oct 2024 06:01 )  Alb: 2.7 g/dL / Pro: 6.0 g/dL / ALK PHOS: 170 U/L / ALT: <5 U/L / AST: 6 U/L / GGT: x                         Urinalysis Basic - ( 05 Oct 2024 06:01 )    Color: x / Appearance: x / SG: x / pH: x  Gluc: 147 mg/dL / Ketone: x  / Bili: x / Urobili: x   Blood: x / Protein: x / Nitrite: x   Leuk Esterase: x / RBC: x / WBC x   Sq Epi: x / Non Sq Epi: x / Bacteria: x        RADIOLOGY & ADDITIONAL TESTS:  < from: TTE Echo w/ Contrast Follow Up Limited (09.29.24 @ 14:42) >  Procedure:   Follow up or Limited Echocardiogram and Echocardiogram with               Definity Contrast.  Indications: I71.9 - Aortic Aneurysm of unspecified Site, without Rupture  Diagnosis:I71.9 - Aortic aneurysm of unspecified site, without rupture        Summary:   1. Low normal global left ventricular systolic function.   2. Multiple left ventricular regional wall motion abnormalities exist.   See wall motion findings.   3. LV Ejection Fraction by Jose's Method with a biplane EF of 56 %.   4. No evidence of LV apical thrombus.   5. Endocardial visualization was enhanced with intravenous echo contrast.   6. Apical wall motion abnormalities appear stable compared to prior   studies.    PHYSICIAN INTERPRETATION:  Left Ventricle: Endocardial visualization was enhanced with intravenous   echo contrast. Global LV systolic function was normal. No evidence of LV   apical thrombus.      LV Wall Scoring:  The apical lateral segment and apex are akinetic. The apical septal   segment,  apical anterior segment, and apical inferior segment are hypokinetic. All  remaining scored segments are normal.      < end of copied text >   Chart reviewed, patient examined. Pertinent results reviewed.  Case discussed with HO; specialist f/u reviewed  HD#15    SCHWABACHER, LAWRENCE    HPI:  Pt is a 64-year-old man with a past medical history of ESRD on hemodialysis MWF (last dialysis on 9/20/24), NIDDM, BPH,DM, CAD s/p  CABG, HTN, CHF, TIA, PAD, left toe ulcer s/p amputation sent in by PCP (Dr. Olivier) for increasing abdomen distention over the last 3 weeks. Patient reported that he developed new onset abdominal distention over the past 3 weeks that is now causing him abdominal pain and difficulty having PO intake with vomiting. He reported that he had to vomit small amounts of food if he eats. He also reported being constipated over the past 2 days. He denied any hematemesis, sob, chest pain, orthopnea, blood in stools, swelling of the legs, fever or chills, He is aneuric and does not produce urine. He denied any history of smoking or excessive alcohol use. Patient presented to the ED on 9/16 and underwent a C, he was discharged on the same day without further work-up. Patient is currently in SNF.        INTERVAL EVENTS: Patient seen this am without distress    MEDICATIONS  (STANDING):  aspirin  chewable 81 milliGRAM(s) Oral daily  atorvastatin 40 milliGRAM(s) Oral at bedtime  chlorhexidine 2% Cloths 1 Application(s) Topical <User Schedule>  clopidogrel Tablet 75 milliGRAM(s) Oral daily  cyanocobalamin 1000 MICROGram(s) Oral daily  darbepoetin Injectable Syringe 50 MICROGram(s) IV Push <User Schedule>  dextrose 5%. 1000 milliLiter(s) (100 mL/Hr) IV Continuous <Continuous>  dextrose 5%. 1000 milliLiter(s) (50 mL/Hr) IV Continuous <Continuous>  dextrose 50% Injectable 25 Gram(s) IV Push once  dextrose 50% Injectable 12.5 Gram(s) IV Push once  dextrose 50% Injectable 25 Gram(s) IV Push once  folic acid 1 milliGRAM(s) Oral daily  glucagon  Injectable 1 milliGRAM(s) IntraMuscular once  heparin   Injectable 5000 Unit(s) SubCutaneous every 8 hours  influenza  Vaccine (HIGH DOSE) 0.5 milliLiter(s) IntraMuscular once  insulin lispro Injectable (ADMELOG) 3 Unit(s) SubCutaneous before dinner  insulin lispro Injectable (ADMELOG) 3 Unit(s) SubCutaneous before breakfast  metoclopramide 5 milliGRAM(s) Oral three times a day  metoprolol succinate ER 25 milliGRAM(s) Oral daily  pantoprazole    Tablet 40 milliGRAM(s) Oral before breakfast  senna 2 Tablet(s) Oral at bedtime  tamsulosin 0.4 milliGRAM(s) Oral at bedtime    MEDICATIONS  (PRN):  acetaminophen     Tablet .. 650 milliGRAM(s) Oral every 6 hours PRN Mild Pain (1 - 3)  artificial  tears Solution 1 Drop(s) Both EYES every 2 hours PRN Dry Eyes  dextrose Oral Gel 15 Gram(s) Oral once PRN Blood Glucose LESS THAN 70 milliGRAM(s)/deciliter  ondansetron Injectable 4 milliGRAM(s) IV Push every 6 hours PRN Nausea and/or Vomiting      Vital Signs Last 24 Hrs  T(C): 36.8 (07 Oct 2024 07:56), Max: 36.8 (07 Oct 2024 07:56)  T(F): 98.2 (07 Oct 2024 07:56), Max: 98.2 (07 Oct 2024 07:56)  HR: 80 (07 Oct 2024 07:56) (73 - 80)  BP: 112/68 (07 Oct 2024 07:56) (98/60 - 146/62)  BP(mean): --  RR: 18 (07 Oct 2024 07:56) (17 - 18)  SpO2: 97% (07 Oct 2024 07:56) (97% - 98%)    Parameters below as of 07 Oct 2024 07:56  Patient On (Oxygen Delivery Method): room air  GEN: No acute distress; NETTA; pale; supine in bed; AAOx4; weak and tired  H: AT/NC; ; TH: MMM; fair dentition  LUNGS: Clear to auscultation  HT: sternotomy scar; RRR, no MRG   ABD:  +1 distended- NT;  soft- no HSM or Mass; + UMB hernia  EXT: No pedal edema- legs wrapped w UNNA boots.  NEURO: AAOX4; Gen weak; legs mi-mod weak and atrophic      LABS:                          7.5    4.56  )-----------( 250      ( 05 Oct 2024 06:01 )             24.0             10-05    133[L]  |  94[L]  |  32[H]  ----------------------------<  147[H]  4.2   |  30  |  4.8[HH]    Ca    7.8[L]      05 Oct 2024 06:01    TPro  6.0  /  Alb  2.7[L]  /  TBili  0.4  /  DBili  x   /  AST  6   /  ALT  <5  /  AlkPhos  170[H]  10-05    LIVER FUNCTIONS - ( 05 Oct 2024 06:01 )  Alb: 2.7 g/dL / Pro: 6.0 g/dL / ALK PHOS: 170 U/L / ALT: <5 U/L / AST: 6 U/L / GGT: x                         Urinalysis Basic - ( 05 Oct 2024 06:01 )    Color: x / Appearance: x / SG: x / pH: x  Gluc: 147 mg/dL / Ketone: x  / Bili: x / Urobili: x   Blood: x / Protein: x / Nitrite: x   Leuk Esterase: x / RBC: x / WBC x   Sq Epi: x / Non Sq Epi: x / Bacteria: x        RADIOLOGY & ADDITIONAL TESTS:  < from: TTE Echo w/ Contrast Follow Up Limited (09.29.24 @ 14:42) >  Procedure:   Follow up or Limited Echocardiogram and Echocardiogram with               Definity Contrast.  Indications: I71.9 - Aortic Aneurysm of unspecified Site, without Rupture  Diagnosis:I71.9 - Aortic aneurysm of unspecified site, without rupture        Summary:   1. Low normal global left ventricular systolic function.   2. Multiple left ventricular regional wall motion abnormalities exist.   See wall motion findings.   3. LV Ejection Fraction by Jose's Method with a biplane EF of 56 %.   4. No evidence of LV apical thrombus.   5. Endocardial visualization was enhanced with intravenous echo contrast.   6. Apical wall motion abnormalities appear stable compared to prior   studies.    PHYSICIAN INTERPRETATION:  Left Ventricle: Endocardial visualization was enhanced with intravenous   echo contrast. Global LV systolic function was normal. No evidence of LV   apical thrombus.      LV Wall Scoring:  The apical lateral segment and apex are akinetic. The apical septal   segment,  apical anterior segment, and apical inferior segment are hypokinetic. All  remaining scored segments are normal.      < end of copied text >

## 2024-10-07 NOTE — DISCHARGE NOTE PROVIDER - NSDCCPCAREPLAN_GEN_ALL_CORE_FT
PRINCIPAL DISCHARGE DIAGNOSIS  Diagnosis: Ascites  Assessment and Plan of Treatment: You were found to have ascites, Large amounts was removed from your belly multiple times. You were also found to have gastroparesis and oyur your were started on a reglan. Please follw up with your PCP and the GI doctor.  SEEK IMMEDIATE MEDICAL CARE IF YOU HAVE ANY OF THE FOLLOWING SYMPTOMS: chest pain, coughing up blood, unexplained back/neck/jaw pain, severe abdominal pain, dizziness or lightheadedness, fainting, shortness of breath, sweaty or clammy skin, vomiting, or racing heart beat. These symptoms may represent a serious problem that is an emergency. Do not wait to see if the symptoms will go away. Get medical help right away. Call 911 and do not drive yourself to the hospital.

## 2024-10-07 NOTE — DISCHARGE NOTE PROVIDER - PROVIDER TOKENS
PROVIDER:[TOKEN:[[id number]],FOLLOWUP:[2 weeks]],PROVIDER:[TOKEN:[[id number]],FOLLOWUP:[2 weeks]]

## 2024-10-07 NOTE — DISCHARGE NOTE PROVIDER - NSDCMRMEDTOKEN_GEN_ALL_CORE_FT
Aldactone 25 mg oral tablet: 1 tab(s) orally once a day  aspirin 81 mg oral tablet: 1 tab(s) orally once a day  atorvastatin 40 mg oral tablet: 1 tab(s) orally once a day   cyanocobalamin 1000 mcg oral tablet: 1 tab(s) orally once a day  folic acid 1 mg oral tablet: 1 tab(s) orally once a day  furosemide 40 mg oral tablet: 1 tab(s) orally once a day  HumaLOG 100 units/mL injectable solution: 5 international unit(s) injectable 2 times a day (with meals)  metoclopramide 5 mg oral tablet: 1 tab(s) orally 3 times a day  metoprolol succinate 25 mg oral capsule, extended release: 0.5 cap(s) orally once a day   NIFEdipine (Eqv-Procardia XL) 60 mg oral tablet, extended release: 1 tab(s) orally once a day  ocular lubricant ophthalmic solution: 3 drop(s) to each affected eye every 2 hours, As needed, Dry Eyes  pantoprazole 40 mg oral delayed release tablet: 1 tab(s) orally once a day  Plavix 75 mg oral tablet: 1 tab(s) orally once a day  senna (sennosides) 8.6 mg oral tablet: 2 tab(s) orally once a day (at bedtime)  tamsulosin 0.4 mg oral capsule: 1 cap(s) orally once a day (at bedtime)  Tylenol 325 mg oral tablet: 2 tab(s) orally every 6 hours, As Needed

## 2024-10-07 NOTE — DISCHARGE NOTE PROVIDER - NSDCFUSCHEDAPPT_GEN_ALL_CORE_FT
Ifeanyi Zarate  [facility] PreAdmits  Scheduled Appointment: 10/09/2024    [facility]  PODIATRY  [address]  Scheduled Appointment: 10/09/2024    Rian PACHECO  [facility]  UROLOGY [id number] [facility]  Scheduled Appointment: 10/10/2024    [facility]  GASTRO Doc Off [id number]  Scheduled Appointment: 10/23/2024

## 2024-10-07 NOTE — CHART NOTE - NSCHARTNOTEFT_GEN_A_CORE
Calorie Count Results: 3-day count Oct 2- Oct 4     Day 1: 29.8 Carb / 1.5 g Protein  Day 2: 28 g Carb/ 4g Protein  Day 3: 16g Carb/ 10g Protein    Total kcal = 357 kcal   Average kcal & protein each day:  *** kcal/day = 119 kcal/day  *** g protein/day = 5g    Louise Wong RDN [phone] or Teams

## 2024-10-10 ENCOUNTER — APPOINTMENT (OUTPATIENT)
Dept: UROLOGY | Facility: CLINIC | Age: 64
End: 2024-10-10

## 2024-10-10 ENCOUNTER — EMERGENCY (EMERGENCY)
Facility: HOSPITAL | Age: 64
LOS: 0 days | Discharge: HOME | End: 2024-10-10
Attending: STUDENT IN AN ORGANIZED HEALTH CARE EDUCATION/TRAINING PROGRAM
Payer: MEDICARE

## 2024-10-10 VITALS
OXYGEN SATURATION: 97 % | DIASTOLIC BLOOD PRESSURE: 70 MMHG | WEIGHT: 162.92 LBS | SYSTOLIC BLOOD PRESSURE: 136 MMHG | RESPIRATION RATE: 18 BRPM | TEMPERATURE: 98 F | HEART RATE: 65 BPM | HEIGHT: 71 IN

## 2024-10-10 VITALS — HEART RATE: 68 BPM | SYSTOLIC BLOOD PRESSURE: 121 MMHG | DIASTOLIC BLOOD PRESSURE: 68 MMHG

## 2024-10-10 DIAGNOSIS — Z95.828 PRESENCE OF OTHER VASCULAR IMPLANTS AND GRAFTS: Chronic | ICD-10-CM

## 2024-10-10 DIAGNOSIS — I77.0 ARTERIOVENOUS FISTULA, ACQUIRED: Chronic | ICD-10-CM

## 2024-10-10 DIAGNOSIS — Z98.890 OTHER SPECIFIED POSTPROCEDURAL STATES: Chronic | ICD-10-CM

## 2024-10-10 DIAGNOSIS — Z95.1 PRESENCE OF AORTOCORONARY BYPASS GRAFT: Chronic | ICD-10-CM

## 2024-10-10 LAB
ALBUMIN SERPL ELPH-MCNC: 2.9 G/DL — LOW (ref 3.5–5.2)
ALP SERPL-CCNC: 184 U/L — HIGH (ref 30–115)
ALT FLD-CCNC: <5 U/L — SIGNIFICANT CHANGE UP (ref 0–41)
ANION GAP SERPL CALC-SCNC: 12 MMOL/L — SIGNIFICANT CHANGE UP (ref 7–14)
AST SERPL-CCNC: 7 U/L — SIGNIFICANT CHANGE UP (ref 0–41)
BASOPHILS # BLD AUTO: 0.07 K/UL — SIGNIFICANT CHANGE UP (ref 0–0.2)
BASOPHILS NFR BLD AUTO: 1.3 % — HIGH (ref 0–1)
BILIRUB SERPL-MCNC: 0.4 MG/DL — SIGNIFICANT CHANGE UP (ref 0.2–1.2)
BUN SERPL-MCNC: 38 MG/DL — HIGH (ref 10–20)
CALCIUM SERPL-MCNC: 7.9 MG/DL — LOW (ref 8.4–10.5)
CHLORIDE SERPL-SCNC: 93 MMOL/L — LOW (ref 98–110)
CO2 SERPL-SCNC: 28 MMOL/L — SIGNIFICANT CHANGE UP (ref 17–32)
CREAT SERPL-MCNC: 5.1 MG/DL — CRITICAL HIGH (ref 0.7–1.5)
EGFR: 12 ML/MIN/1.73M2 — LOW
EOSINOPHIL # BLD AUTO: 0.2 K/UL — SIGNIFICANT CHANGE UP (ref 0–0.7)
EOSINOPHIL NFR BLD AUTO: 3.6 % — SIGNIFICANT CHANGE UP (ref 0–8)
GLUCOSE SERPL-MCNC: 157 MG/DL — HIGH (ref 70–99)
HCT VFR BLD CALC: 22.7 % — LOW (ref 42–52)
HGB BLD-MCNC: 7.3 G/DL — LOW (ref 14–18)
IMM GRANULOCYTES NFR BLD AUTO: 0.2 % — SIGNIFICANT CHANGE UP (ref 0.1–0.3)
LYMPHOCYTES # BLD AUTO: 0.56 K/UL — LOW (ref 1.2–3.4)
LYMPHOCYTES # BLD AUTO: 10 % — LOW (ref 20.5–51.1)
MCHC RBC-ENTMCNC: 26.5 PG — LOW (ref 27–31)
MCHC RBC-ENTMCNC: 32.2 G/DL — SIGNIFICANT CHANGE UP (ref 32–37)
MCV RBC AUTO: 82.5 FL — SIGNIFICANT CHANGE UP (ref 80–94)
MONOCYTES # BLD AUTO: 0.38 K/UL — SIGNIFICANT CHANGE UP (ref 0.1–0.6)
MONOCYTES NFR BLD AUTO: 6.8 % — SIGNIFICANT CHANGE UP (ref 1.7–9.3)
NEUTROPHILS # BLD AUTO: 4.38 K/UL — SIGNIFICANT CHANGE UP (ref 1.4–6.5)
NEUTROPHILS NFR BLD AUTO: 78.1 % — HIGH (ref 42.2–75.2)
NRBC # BLD: 0 /100 WBCS — SIGNIFICANT CHANGE UP (ref 0–0)
PLATELET # BLD AUTO: 209 K/UL — SIGNIFICANT CHANGE UP (ref 130–400)
PMV BLD: 10.1 FL — SIGNIFICANT CHANGE UP (ref 7.4–10.4)
POTASSIUM SERPL-MCNC: 4.2 MMOL/L — SIGNIFICANT CHANGE UP (ref 3.5–5)
POTASSIUM SERPL-SCNC: 4.2 MMOL/L — SIGNIFICANT CHANGE UP (ref 3.5–5)
PROT SERPL-MCNC: 6.4 G/DL — SIGNIFICANT CHANGE UP (ref 6–8)
RBC # BLD: 2.75 M/UL — LOW (ref 4.7–6.1)
RBC # FLD: 17.2 % — HIGH (ref 11.5–14.5)
SODIUM SERPL-SCNC: 133 MMOL/L — LOW (ref 135–146)
WBC # BLD: 5.6 K/UL — SIGNIFICANT CHANGE UP (ref 4.8–10.8)
WBC # FLD AUTO: 5.6 K/UL — SIGNIFICANT CHANGE UP (ref 4.8–10.8)

## 2024-10-10 PROCEDURE — 80053 COMPREHEN METABOLIC PANEL: CPT

## 2024-10-10 PROCEDURE — 90935 HEMODIALYSIS ONE EVALUATION: CPT

## 2024-10-10 PROCEDURE — 86850 RBC ANTIBODY SCREEN: CPT

## 2024-10-10 PROCEDURE — P9016: CPT

## 2024-10-10 PROCEDURE — 36415 COLL VENOUS BLD VENIPUNCTURE: CPT

## 2024-10-10 PROCEDURE — 86901 BLOOD TYPING SEROLOGIC RH(D): CPT

## 2024-10-10 PROCEDURE — G0378: CPT

## 2024-10-10 PROCEDURE — 86900 BLOOD TYPING SEROLOGIC ABO: CPT

## 2024-10-10 PROCEDURE — 86923 COMPATIBILITY TEST ELECTRIC: CPT

## 2024-10-10 PROCEDURE — 85025 COMPLETE CBC W/AUTO DIFF WBC: CPT

## 2024-10-10 PROCEDURE — 36430 TRANSFUSION BLD/BLD COMPNT: CPT | Mod: XU

## 2024-10-10 PROCEDURE — 99223 1ST HOSP IP/OBS HIGH 75: CPT | Mod: FS

## 2024-10-10 PROCEDURE — 99283 EMERGENCY DEPT VISIT LOW MDM: CPT | Mod: 25

## 2024-10-10 RX ORDER — ACETAMINOPHEN 325 MG
650 TABLET ORAL ONCE
Refills: 0 | Status: COMPLETED | OUTPATIENT
Start: 2024-10-10 | End: 2024-10-10

## 2024-10-10 RX ADMIN — Medication 650 MILLIGRAM(S): at 18:06

## 2024-10-10 NOTE — ED ADULT NURSE NOTE - NSICDXPASTMEDICALHX_GEN_ALL_CORE_FT
PAST MEDICAL HISTORY:  BPH (benign prostatic hyperplasia)     Chronic anemia     Chronic kidney disease (CKD) Stage IV    Diabetes mellitus     History of medical problems left arm precaution    HLD (hyperlipidemia)     [place] (hard of hearing)     Hypertension     Myocardial infarction 2012    OA (osteoarthritis)     PAD (peripheral artery disease) S/p bypass left leg    Pain in left knee s/p fall    S/P CABG (coronary artery bypass graft) x4    Stented coronary artery in 2008    Transient ischemic attack (TIA) 2017; 2008

## 2024-10-10 NOTE — ED CDU PROVIDER INITIAL DAY NOTE - OBJECTIVE STATEMENT
64-year-old male PMH ESRD on dialysis M/T/Th/F (last dialyzed on 10/8), NIDDM, BPH, CAD status post CABG, HTN, CHF, TIA, PAD, left toe ulcer status post amputation presents to the ED for evaluation of low hemoglobin and for dialysis.  Patient presents from [facility], found to have a hemoglobin of 6.9 on blood work done at facility. Of note patient recent admission for ascites, had therapeutic paracentesis draining 15 L. Patient reports lightheadedness. Denies chest pain, shortness of breath, syncope/near syncope, palpitations, abdominal pain, nausea, vomiting, diarrhea, black/bloody stools, dysuria or hematuria. Denies mucosal bleeding. Patient had a colonoscopy 2 years ago and has follow up with GI.   Nephrologist: Dr. Denis

## 2024-10-10 NOTE — ED CDU PROVIDER INITIAL DAY NOTE - NSICDXPASTMEDICALHX_GEN_ALL_CORE_FT
PAST MEDICAL HISTORY:  BPH (benign prostatic hyperplasia)     Chronic anemia     Chronic kidney disease (CKD) Stage IV    Diabetes mellitus     History of medical problems left arm precaution    HLD (hyperlipidemia)     [place] (hard of hearing)     Hypertension     Myocardial infarction 2012    OA (osteoarthritis)     PAD (peripheral artery disease) S/p bypass left leg    Pain in left knee s/p fall    S/P CABG (coronary artery bypass graft) x4    Stented coronary artery in 2008    Transient ischemic attack (TIA) 2017; 2008    
 OB Discharge Instructions

## 2024-10-10 NOTE — CONSULT NOTE ADULT - ATTENDING COMMENTS
Pt seen and exmaiend  was snet in from [facility] for Hgb 6.9 and dizziness  Here Hgb a bit better can give PRBC  For HD today   Can dc after

## 2024-10-10 NOTE — ED ADULT NURSE NOTE - NSHOSCREENINGQ1_ED_ALL_ED
Pt called by [facility].  Message routed to Dr. Damian.   No

## 2024-10-10 NOTE — ED PROVIDER NOTE - PROGRESS NOTE DETAILS
Hb 7.3. PPatient evaluated by nephrology, will dialyze today. Will place in obs for dialysis and transfusion.

## 2024-10-10 NOTE — ED CDU PROVIDER DISPOSITION NOTE - PATIENT PORTAL LINK FT
[facility] called regarding the Infusion order needs a call ASAP.   You can access the FollowMyHealth Patient Portal offered by [facility] by registering at the following website: [email or web address]. By joining [employer]’s FollowMyHealth portal, you will also be able to view your health information using other applications (apps) compatible with our system.

## 2024-10-10 NOTE — ED CDU PROVIDER INITIAL DAY NOTE - PHYSICAL EXAMINATION
VITAL SIGNS: I have reviewed nursing notes and confirm.  CONSTITUTIONAL: well-appearing. Pale.  SKIN: Warm dry  HEAD: NCAT  EYES: EOMI, PERRL  ENT: Moist mucous membranes, normal pharynx with no erythema or exudates  NECK: Supple  CARD: RRR, no murmurs, rubs or gallops  RESP: clear to ausculation b/l.  No rales, rhonchi, or wheezing.  ABD: soft, non-tender, mildly distended.  EXT: Full ROM, no bony tenderness, no pedal edema  NEURO: Grossly intact. A&O x4.   PSYCH: Cooperative, appropriate.

## 2024-10-10 NOTE — CONSULT NOTE ADULT - SUBJECTIVE AND OBJECTIVE BOX
NEPHROLOGY CONSULTATION NOTE    Patient is a 64-year-old male PMH ESRD on dialysis M/T/Th/F (last dialyzed on 10/8) through AVF, NIDDM, BPH, CAD status post CABG, HTN, CHF, TIA, PAD, left toe ulcer status post amputation presents to the ED for evaluation of low hemoglobin and for dialysis.  Patient presents from [facility], found to have a hemoglobin of 6.9 on blood work done at facility. Of note patient recent admission for ascites, had therapeutic paracentesis draining 15 L. Patient reports lightheadedness. Denies chest pain, shortness of breath, syncope/near syncope, palpitations, abdominal pain, nausea, vomiting, diarrhea, black/bloody stools, dysuria or hematuria. Denies mucosal bleeding. Patient had a colonoscopy 2 years ago and has follow up with GI.   -----------------------------------  Above information obtained from ER provider note.   Nephrology was consulted for establishing HD spot while patient admitted for medical conditions.   -Patient was seen and examined at bedside.   Patient reported dizziness and low hb at nursing home.      PAST MEDICAL & SURGICAL HISTORY:  S/P CABG (coronary artery bypass graft)  x4  Diabetes mellitus  Transient ischemic attack (TIA)  2017; 2008  Chronic kidney disease (CKD)  Stage IV  Hypertension  Stented coronary artery  in 2008  Myocardial infarction  2012  PAD (peripheral artery disease)  S/p bypass left leg  HLD (hyperlipidemia)  BPH (benign prostatic hyperplasia)  Pain in left knee  s/p fall  OA (osteoarthritis)  [place] (hard of hearing)  Chronic anemia  History of medical problems  left arm precaution  S/P CABG (coronary artery bypass graft)  2012  H/O arterial bypass of lower limb  Left Lower Extremity (2016)  History of surgery  Left CEA (2017)  Left Pinkie toe Amputation (2014)  CABG x 4 (2012)  Card cath - stent (2008)  AV fistula  2019  LEFT AV FISTULA      Allergies:  No Known Allergies    Home Medications:  aspirin 81 mg oral tablet: 1 tab(s) orally once a day (08 May 2024 09:32)  HumaLOG 100 units/mL injectable solution: 5 international unit(s) injectable 2 times a day (with meals) (08 May 2024 09:32)  metoclopramide 5 mg oral tablet: 1 tab(s) orally 3 times a day (07 Oct 2024 12:09)  ocular lubricant ophthalmic solution: 3 drop(s) to each affected eye every 2 hours, As needed, Dry Eyes (08 May 2024 09:32)  pantoprazole 40 mg oral delayed release tablet: 1 tab(s) orally once a day (08 May 2024 09:16)  Plavix 75 mg oral tablet: 1 tab(s) orally once a day (08 May 2024 09:32)  senna (sennosides) 8.6 mg oral tablet: 2 tab(s) orally once a day (at bedtime) (23 Sep 2024 17:27)  tamsulosin 0.4 mg oral capsule: 1 cap(s) orally once a day (at bedtime) (08 May 2024 09:32)  Tylenol 325 mg oral tablet: 2 tab(s) orally every 6 hours, As Needed (08 May 2024 09:32)    Hospital Medications:   MEDICATIONS  (STANDING):    SOCIAL HISTORY:  Denies ETOH,Smoking,   FAMILY HISTORY:  Family history of heart disease (Father)    DM (diabetes mellitus) (Mother)    ESRD (end stage renal disease) on dialysis (Mother)    REVIEW OF SYSTEMS:  CONSTITUTIONAL: dizziness  RESPIRATORY: No cough, wheezing, hemoptysis; No shortness of breath  CARDIOVASCULAR: No chest pain or palpitations.  GASTROINTESTINAL: No abdominal or epigastric pain.  GENITOURINARY: No dysuria, frequency, foamy urine, urinary urgency, incontinence or hematuria  NEUROLOGICAL: No numbness or weakness  SKIN: No itching, burning, rashes, or lesions   VASCULAR: No bilateral lower extremity edema.   All other review of systems is negative unless indicated above.    VITALS:  Vital Signs Last 24 Hrs  T(C): 36.4 (10 Oct 2024 10:49), Max: 36.4 (10 Oct 2024 10:49)  T(F): 97.6 (10 Oct 2024 10:49), Max: 97.6 (10 Oct 2024 10:49)  HR: 65 (10 Oct 2024 10:49) (65 - 65)  BP: 136/70 (10 Oct 2024 10:49) (136/70 - 136/70)  BP(mean): --  RR: 18 (10 Oct 2024 10:49) (18 - 18)  SpO2: 97% (10 Oct 2024 10:49) (97% - 97%)    Parameters below as of 10 Oct 2024 10:49  Patient On (Oxygen Delivery Method): room air    Height (cm): 180.3 (10-10 @ 10:49)  Weight (kg): 73.9 (10-10 @ 10:49)  BMI (kg/m2): 22.7 (10-10 @ 10:49)  BSA (m2): 1.93 (10-10 @ 10:49)    PHYSICAL EXAM:  Constitutional: NAD  Respiratory: CTAB, no wheezes, rales or rhonchi  Cardiovascular: S1, S2, RRR  Gastrointestinal: BS+, soft, NT/ND  Extremities: No cyanosis or clubbing. No peripheral edema  Neurological: A/O x 3, no focal deficits  Psychiatric: Normal mood, normal affect  : No CVA tenderness. No schneider.   Skin: No rashes  Vascular Access: Left AVF.     LABS:  10-10    133[L]  |  93[L]  |  38[H]  ----------------------------<  157[H]  4.2   |  28  |  5.1[HH]    Ca    7.9[L]      10 Oct 2024 11:25    TPro  6.4  /  Alb  2.9[L]  /  TBili  0.4  /  DBili      /  AST  7   /  ALT  <5  /  AlkPhos  184[H]  10-10    Creatinine Trend: 5.1 <--, 4.8 <--, 4.5 <--, 3.7 <--, 5.3 <--, 4.4 <--, 5.4 <--, 4.3 <--, 5.3 <--, 4.6 <--, 5.2 <--, 5.1 <--, 3.4 <--                        7.3    5.60  )-----------( 209      ( 10 Oct 2024 11:25 )             22.7     Urine Studies:  Urinalysis Basic - ( 10 Oct 2024 11:25 )    Color:  / Appearance:  / SG:  / pH:   Gluc: 157 mg/dL / Ketone:   / Bili:  / Urobili:    Blood:  / Protein:  / Nitrite:    Leuk Esterase:  / RBC:  / WBC    Sq Epi:  / Non Sq Epi:  / Bacteria:     09-24 @ 06:56  7.8  135  --    RADIOLOGY & ADDITIONAL STUDIES:                 NEPHROLOGY CONSULTATION NOTE    Patient is a 64-year-old male PMH ESRD on dialysis M/T/Th/F (last dialyzed on 10/8) through AVF, NIDDM, BPH, CAD status post CABG, HTN, CHF, TIA, PAD, left toe ulcer status post amputation presents to the ED for evaluation of low hemoglobin and for dialysis.  Patient presents from [facility], found to have a hemoglobin of 6.9 on blood work done at facility. Of note patient recent admission for ascites, had therapeutic paracentesis draining 15 L. Patient reports lightheadedness. Denies chest pain, shortness of breath, syncope/near syncope, palpitations, abdominal pain, nausea, vomiting, diarrhea, black/bloody stools, dysuria or hematuria. Denies mucosal bleeding. Patient had a colonoscopy 2 years ago and has follow up with GI.   -----------------------------------  Above information obtained from ER provider note.   -Patient was seen and examined at bedside.   Patient reported dizziness and low hb at nursing home.      PAST MEDICAL & SURGICAL HISTORY:  S/P CABG (coronary artery bypass graft)  x4  Diabetes mellitus  Transient ischemic attack (TIA)  2017; 2008  Chronic kidney disease (CKD)  Stage IV  Hypertension  Stented coronary artery  in 2008  Myocardial infarction  2012  PAD (peripheral artery disease)  S/p bypass left leg  HLD (hyperlipidemia)  BPH (benign prostatic hyperplasia)  Pain in left knee  s/p fall  OA (osteoarthritis)  [place] (hard of hearing)  Chronic anemia  History of medical problems  left arm precaution  S/P CABG (coronary artery bypass graft)  2012  H/O arterial bypass of lower limb  Left Lower Extremity (2016)  History of surgery  Left CEA (2017)  Left Pinkie toe Amputation (2014)  CABG x 4 (2012)  Card cath - stent (2008)  AV fistula  2019  LEFT AV FISTULA      Allergies:  No Known Allergies    Home Medications:  aspirin 81 mg oral tablet: 1 tab(s) orally once a day (08 May 2024 09:32)  HumaLOG 100 units/mL injectable solution: 5 international unit(s) injectable 2 times a day (with meals) (08 May 2024 09:32)  metoclopramide 5 mg oral tablet: 1 tab(s) orally 3 times a day (07 Oct 2024 12:09)  ocular lubricant ophthalmic solution: 3 drop(s) to each affected eye every 2 hours, As needed, Dry Eyes (08 May 2024 09:32)  pantoprazole 40 mg oral delayed release tablet: 1 tab(s) orally once a day (08 May 2024 09:16)  Plavix 75 mg oral tablet: 1 tab(s) orally once a day (08 May 2024 09:32)  senna (sennosides) 8.6 mg oral tablet: 2 tab(s) orally once a day (at bedtime) (23 Sep 2024 17:27)  tamsulosin 0.4 mg oral capsule: 1 cap(s) orally once a day (at bedtime) (08 May 2024 09:32)  Tylenol 325 mg oral tablet: 2 tab(s) orally every 6 hours, As Needed (08 May 2024 09:32)    Hospital Medications:   MEDICATIONS  (STANDING):    SOCIAL HISTORY:  Denies ETOH,Smoking,   FAMILY HISTORY:  Family history of heart disease (Father)    DM (diabetes mellitus) (Mother)    ESRD (end stage renal disease) on dialysis (Mother)    REVIEW OF SYSTEMS:  CONSTITUTIONAL: dizziness  RESPIRATORY: No cough, wheezing, hemoptysis; No shortness of breath  CARDIOVASCULAR: No chest pain or palpitations.  GASTROINTESTINAL: No abdominal or epigastric pain.  GENITOURINARY: No dysuria, frequency, foamy urine, urinary urgency, incontinence or hematuria  NEUROLOGICAL: No numbness or weakness  SKIN: No itching, burning, rashes, or lesions   VASCULAR: No bilateral lower extremity edema.   All other review of systems is negative unless indicated above.    VITALS:  Vital Signs Last 24 Hrs  T(C): 36.4 (10 Oct 2024 10:49), Max: 36.4 (10 Oct 2024 10:49)  T(F): 97.6 (10 Oct 2024 10:49), Max: 97.6 (10 Oct 2024 10:49)  HR: 65 (10 Oct 2024 10:49) (65 - 65)  BP: 136/70 (10 Oct 2024 10:49) (136/70 - 136/70)  BP(mean): --  RR: 18 (10 Oct 2024 10:49) (18 - 18)  SpO2: 97% (10 Oct 2024 10:49) (97% - 97%)    Parameters below as of 10 Oct 2024 10:49  Patient On (Oxygen Delivery Method): room air    Height (cm): 180.3 (10-10 @ 10:49)  Weight (kg): 73.9 (10-10 @ 10:49)  BMI (kg/m2): 22.7 (10-10 @ 10:49)  BSA (m2): 1.93 (10-10 @ 10:49)    PHYSICAL EXAM:  Constitutional: NAD  Respiratory: CTAB, no wheezes, rales or rhonchi  Cardiovascular: S1, S2, RRR  Gastrointestinal: BS+, soft, NT/ND  Extremities: No cyanosis or clubbing. No peripheral edema  Neurological: A/O x 3, no focal deficits  Psychiatric: Normal mood, normal affect  : No CVA tenderness. No schneider.   Skin: No rashes  Vascular Access: Left AVF.     LABS:  10-10    133[L]  |  93[L]  |  38[H]  ----------------------------<  157[H]  4.2   |  28  |  5.1[HH]    Ca    7.9[L]      10 Oct 2024 11:25    TPro  6.4  /  Alb  2.9[L]  /  TBili  0.4  /  DBili      /  AST  7   /  ALT  <5  /  AlkPhos  184[H]  10-10    Creatinine Trend: 5.1 <--, 4.8 <--, 4.5 <--, 3.7 <--, 5.3 <--, 4.4 <--, 5.4 <--, 4.3 <--, 5.3 <--, 4.6 <--, 5.2 <--, 5.1 <--, 3.4 <--                        7.3    5.60  )-----------( 209      ( 10 Oct 2024 11:25 )             22.7     Urine Studies:  Urinalysis Basic - ( 10 Oct 2024 11:25 )    Color:  / Appearance:  / SG:  / pH:   Gluc: 157 mg/dL / Ketone:   / Bili:  / Urobili:    Blood:  / Protein:  / Nitrite:    Leuk Esterase:  / RBC:  / WBC    Sq Epi:  / Non Sq Epi:  / Bacteria:     09-24 @ 06:56  7.8  135  --    RADIOLOGY & ADDITIONAL STUDIES:

## 2024-10-10 NOTE — ED ADULT NURSE NOTE - OBJECTIVE STATEMENT
Pt c/o sent in from [place] for low hgb. Pt complains of generalized weakness. Denies chest pain

## 2024-10-10 NOTE — ED CDU PROVIDER DISPOSITION NOTE - NSFOLLOWUPINSTRUCTIONS_ED_ALL_ED_FT
Anemia    Anemia is a condition in which the concentration of red blood cells or hemoglobin in the blood is below normal. Hemoglobin is a substance in red blood cells that carries oxygen to the tissues of the body. Anemia results in not enough oxygen reaching these tissues which can cause symptoms such as weakness, dizziness/lightheadedness, shortness of breath, chest pain, paleness, or nausea.    SEEK IMMEDIATE MEDICAL CARE IF YOU HAVE THE FOLLOWING SYMPTOMS: extreme weakness/chest pain/shortness of breath, black or bloody stools, vomiting blood, fainting, fever, or any signs of dehydration.    Blood Transfusion    WHAT YOU NEED TO KNOW:    A blood transfusion is used to give you blood through an IV. You may get only part of the blood, such as red blood cells, platelets, or plasma. The blood may be from you and stored for you to use later. The blood may instead be from another person. Donated blood is tested for HIV, hepatitis, syphilis, West Nile virus, and other diseases.    DISCHARGE INSTRUCTIONS:    Call 911 for any of the following:     You have a skin rash, hives, swelling, or itching.       You have trouble breathing, shortness of breath, wheezing, or coughing.      Your throat tightens or your lips or tongue swell.      You have difficulty swallowing or speaking.    Seek care immediately if:     You develop a high fever and chills.       You are dizzy, lightheaded, confused, or feel like you are going to faint.      You have nausea, diarrhea, or abdominal cramps, or you are vomiting.      You urinate little or not at all.      You develop headaches or double vision.      Your skin or the whites of your eyes look yellow.      You see pinpoint purple spots or purple patches on your body.       You have a seizure.     Contact your healthcare provider if:     You feel tired and weak within 10 days of your transfusion.      You have questions or concerns about blood transfusions.    Medicines:     Antihistamines may help stop mild itching or a rash.      Epinephrine is emergency medicine used to stop anaphylaxis. You may be given epinephrine if you are at risk for anaphylaxis. Your healthcare provider will teach you how to use it.      Take your medicine as directed. Contact your healthcare provider if you think your medicine is not helping or if you have side effects. Tell him or her if you are allergic to any medicine. Keep a list of the medicines, vitamins, and herbs you take. Include the amounts, and when and why you take them. Bring the list or the pill bottles to follow-up visits. Carry your medicine list with you in case of an emergency.    Apply ice to decrease pain and swelling. Use an ice pack, or put ice in a plastic bag and wrap a towel around it. Apply the ice pack or wrapped bag to your transfusion site for 20 minutes each hour or as directed.     Follow up with your healthcare provider as directed: Write down your questions so you remember to ask them during your visits.       © Copyright [employer] 2019 All illustrations and images included in CareNotes are the copyrighted property of [employer] Inc[employer] or [employer].

## 2024-10-10 NOTE — ED CDU PROVIDER DISPOSITION NOTE - ATTENDING APP SHARED VISIT CONTRIBUTION OF CARE
I saw and examined the pt on the day of discharge     Vitals: HD stable, O2 stable  Gen: appropriate affect, no acute distress  Neuro: no focal defects, no sensory deficits  HEENT: EOMI, no JVD, normocephalic, atraumatic, no scleral icterus  Cardio: RRR, S1 S2 present, no murmurs, rubs, or gallops  Resp: lungs b/l CTA, chest wall intact  Abd: soft, nondistended, nontender  MSK: no gross joint abnormalities, no obvious swelling  Skin: no rashes, no wounds  heme: no ecchymosis or petechiae     Pt received blood transfusion with dialysis and felt better. Pt to return to [facility]. pt stable for dc.     Pt was given strict instructions and return precautions. pt was instructed to return to the ER if symptoms worsen at any time and to return to the ER at any time for any other medical concern. Pt was counseled on continuing home medications and new prescriptions.     please note that this is a summary of the medical record. please refer to the EMR for further details

## 2024-10-10 NOTE — ED PROVIDER NOTE - NSICDXPASTMEDICALHX_GEN_ALL_CORE_FT
PAST MEDICAL HISTORY:  BPH (benign prostatic hyperplasia)     Chronic anemia     Chronic kidney disease (CKD) Stage IV    Diabetes mellitus     History of medical problems left arm precaution    HLD (hyperlipidemia)     [place] (hard of hearing)     Hypertension     Myocardial infarction 2012    OA (osteoarthritis)     PAD (peripheral artery disease) S/p bypass left leg    Pain in left knee s/p fall    S/P CABG (coronary artery bypass graft) x4    Stented coronary artery in 2008    Transient ischemic attack (TIA) 2017; 2008

## 2024-10-10 NOTE — ED PROVIDER NOTE - OBJECTIVE STATEMENT
64-year-old male PMH ESRD on dialysis M/T/Th/F (last dialyzed on 10/8), NIDDM, BPH, CAD status post CABG, HTN, CHF, TIA, PAD, left toe ulcer status post amputation presents to the ED for evaluation of low hemoglobin. Patient presents from [facility], found to have a hemoglobin of 6.9 on blood work done at facility. Of note patient recent admission for ascites, had therapeutic paracentesis draining 15 L. Patient reports dizziness. Denies chest pain, shortness of breath, syncope/near syncope, palpitations, abdominal pain, nausea, vomiting, diarrhea, black/bloody stools, dysuria or hematuria. Denies mucosal bleeding. 64-year-old male PMH ESRD on dialysis M/T/Th/F (last dialyzed on 10/8), NIDDM, BPH, CAD status post CABG, HTN, CHF, TIA, PAD, left toe ulcer status post amputation presents to the ED for evaluation of low hemoglobin. Patient presents from [facility], found to have a hemoglobin of 6.9 on blood work done at facility. Of note patient recent admission for ascites, had therapeutic paracentesis draining 15 L. Patient reports dizziness. Denies chest pain, shortness of breath, syncope/near syncope, palpitations, abdominal pain, nausea, vomiting, diarrhea, black/bloody stools, dysuria or hematuria. Denies mucosal bleeding.  Nephrologist: Dr. Denis 64-year-old male PMH ESRD on dialysis M/T/Th/F (last dialyzed on 10/8), NIDDM, BPH, CAD status post CABG, HTN, CHF, TIA, PAD, left toe ulcer status post amputation presents to the ED for evaluation of low hemoglobin and for dialysis.  Patient presents from [facility], found to have a hemoglobin of 6.9 on blood work done at facility. Of note patient recent admission for ascites, had therapeutic paracentesis draining 15 L. Patient reports lightheadedness. Denies chest pain, shortness of breath, syncope/near syncope, palpitations, abdominal pain, nausea, vomiting, diarrhea, black/bloody stools, dysuria or hematuria. Denies mucosal bleeding. Patient had a colonoscopy 2 years ago and has follow up with GI.   Nephrologist: Dr. Denis

## 2024-10-10 NOTE — ED PROVIDER NOTE - CLINICAL SUMMARY MEDICAL DECISION MAKING FREE TEXT BOX
The MDM was performed and documented by me personally, Dr. Kye Wisdom, supervising attending     The patient has a past medical history of end-stage renal disease, Tuesday, Thursday, Saturday, diabetes, hypertension, history of ascites, who is here from [facility] due to hemoglobin level 6.9.  Patient has also been having lightheadedness and associated with this but no shortness of breath, no chest pain, no hematuria, no blood in stool.  Patient has had no diarrhea or abdominal pain.    Vitals: HD stable, O2 stable  Gen: appropriate affect, no acute distress, Pale appearing  Neuro: no focal defects, no sensory deficits  HEENT: EOMI, no JVD, normocephalic, atraumatic, no scleral icterus  Cardio: RRR, S1 S2 present, no murmurs, rubs, or gallops  Resp: lungs b/l CTA, chest wall intact  Abd: soft, nondistended, nontender  MSK: no gross joint abnormalities, no obvious swelling  Skin: no rashes, no wounds  heme: no ecchymosis or petechiae     Repeat CBC obtained which shows a hemoglobin of 7.3.  However being that the patient has symptoms associated with this hemoglobin and that his hemoglobin was 6.9, we will give a unit of blood.  Patient also will require dialysis today.  Patient will be admitted to the observation unit to receive dialysis and then discharge. .  Patient has follow-up at [place] and can get repeat blood work there.  I also had a joint discussion and agreement with patient that being that he has a history of end-stage renal disease, and he has no blood in stool, he had a colonoscopy 2 years ago, and he has GI follow-up, that we will not perform a digital rectal exam today The MDM was performed and documented by me personally, Dr. Kye Wisdom, supervising attending     The patient has a past medical history of end-stage renal disease, Tuesday, Thursday, Saturday, diabetes, hypertension, history of ascites, who is here from [facility] due to hemoglobin level 6.9.  Patient has also been having lightheadedness and associated with this but no shortness of breath, no chest pain, no hematuria, no blood in stool.  Patient has had no diarrhea or abdominal pain.    Vitals: HD stable, O2 stable  Gen: appropriate affect, no acute distress, Pale appearing  Neuro: no focal defects, no sensory deficits  HEENT: EOMI, no JVD, normocephalic, atraumatic, no scleral icterus  Cardio: RRR, S1 S2 present, no murmurs, rubs, or gallops  Resp: lungs b/l CTA, chest wall intact  Abd: soft, nondistended, nontender  MSK: no gross joint abnormalities, no obvious swelling  Skin: no rashes, no wounds  heme: no ecchymosis or petechiae     Repeat CBC obtained which shows a hemoglobin of 7.3.  However being that the patient has symptoms associated with this hemoglobin and that his hemoglobin was 6.9, we will give a unit of blood.  Patient also will require dialysis today.  Patient will be admitted to the observation unit to receive dialysis and then discharge. .  Patient has follow-up at [place] and can get repeat blood work there.  I also had a joint discussion and agreement with patient that being that he has a history of end-stage renal disease, and he has no blood in stool, he had a colonoscopy 2 years ago, and he has GI follow-up, that we will not perform a digital rectal exam today. .  Patient will be admitted to observation for dialysis and also 1 unit of blood transfusion and patient will be able to be discharged afterwards to [place].  Patient was understanding.  [facility] and they are aware that patient will be discharged after dialysis session. Patient given strict instructions and return precautions.  Patient needs to follow-up with his nephrologist

## 2024-10-10 NOTE — ED CDU PROVIDER INITIAL DAY NOTE - CLINICAL SUMMARY MEDICAL DECISION MAKING FREE TEXT BOX
The MDM was performed and documented by me personally, Dr. Kye Wisdom, supervising attending     Patient is here because of low hemoglobin in the setting of end-stage renal disease.  Hemoglobin was 6.9 outpatient and here 7.3.  Patient has no rectal bleeding.  Patient had joint agreement with me that we will not do digital rectal exam at this time since he had colonoscopy 2 years ago and has no rectal bleeding.  Patient admitted to [facility] for dialysis and received blood transfusion.  He will be able to go home back to [facility] after dialysis session.    Vitals: HD stable, O2 stable  Gen: appropriate affect, no acute distress  Neuro: no focal defects, no sensory deficits  HEENT: EOMI, no JVD, normocephalic, atraumatic, no scleral icterus  Cardio: RRR, S1 S2 present, no murmurs, rubs, or gallops  Resp: lungs b/l CTA, chest wall intact  Abd: soft, nondistended, nontender  MSK: no gross joint abnormalities, no obvious swelling  Skin: no rashes, no wounds  heme: no ecchymosis or petechiae

## 2024-10-10 NOTE — CONSULT NOTE ADULT - ASSESSMENT
Patient is a 64-year-old male PMH ESRD on dialysis M/T/Th/F (last dialyzed on 10/8) through AVF, NIDDM, BPH, CAD status post CABG, HTN, CHF, TIA, PAD, left toe ulcer status post amputation presents to the ED for evaluation of low hemoglobin and for dialysis.  Patient presents from [facility], found to have a hemoglobin of 6.9 on blood work done at facility.    #ESRD on HD (M-T-Th-F)  #Normocytic anemia likely 2/2 ESRD, Anemia of chronic inflammation.  -Iron studies reviewed.     Plan:  Monitor electrolytes and correct as needed.   HD for 3 hours, 3K bath and 2L UF as tolerated.   Phosphorus and PTH reviewed at goal.   Monitor HH, hb below 8 and symptomatic, transfuse 1 unit.   Adjust all medications to GFR.   Nephrology will follow.

## 2024-10-10 NOTE — ED PROVIDER NOTE - PHYSICAL EXAMINATION
VITAL SIGNS: I have reviewed nursing notes and confirm.  CONSTITUTIONAL: well-appearing  SKIN: Warm dry  HEAD: NCAT  EYES: EOMI, PERRL  ENT: Moist mucous membranes, normal pharynx with no erythema or exudates  NECK: Supple  CARD: RRR, no murmurs, rubs or gallops  RESP: clear to ausculation b/l.  No rales, rhonchi, or wheezing.  ABD: soft, non-tender, mildly distended.  EXT: Full ROM, no bony tenderness, no pedal edema  NEURO: Grossly intact. A&O x4.   PSYCH: Cooperative, appropriate. VITAL SIGNS: I have reviewed nursing notes and confirm.  CONSTITUTIONAL: well-appearing. Pale.  SKIN: Warm dry  HEAD: NCAT  EYES: EOMI, PERRL  ENT: Moist mucous membranes, normal pharynx with no erythema or exudates  NECK: Supple  CARD: RRR, no murmurs, rubs or gallops  RESP: clear to ausculation b/l.  No rales, rhonchi, or wheezing.  ABD: soft, non-tender, mildly distended.  EXT: Full ROM, no bony tenderness, no pedal edema  NEURO: Grossly intact. A&O x4.   PSYCH: Cooperative, appropriate.

## 2024-10-11 NOTE — PROGRESS NOTE ADULT - SUBJECTIVE AND OBJECTIVE BOX
Nephrology progress note    Patient was seen and examined, events over the last 24 h noted .    Allergies:  No Known Allergies    Hospital Medications:   MEDICATIONS  (STANDING):        VITALS:  T(F): 97.6 (10-10-24 @ 10:49), Max: 97.6 (10-10-24 @ 10:49)  HR: 68 (10-10-24 @ 17:05)  BP: 121/68 (10-10-24 @ 17:05)  RR: 18 (10-10-24 @ 14:55)  SpO2: 97% (10-10-24 @ 10:49)  Wt(kg): --    10-10 @ 07:01  -  10-11 @ 07:00  --------------------------------------------------------  IN: 275 mL / OUT: 1600 mL / NET: -1325 mL      Height (cm): 180.3 (10-10 @ 10:49)  Weight (kg): 73.9 (10-10 @ 10:49)  BMI (kg/m2): 22.7 (10-10 @ 10:49)  BSA (m2): 1.93 (10-10 @ 10:49)    PHYSICAL EXAM:  Constitutional: NAD  HEENT: anicteric sclera, oropharynx clear, MMM  Neck: No JVD  Respiratory: CTAB, no wheezes, rales or rhonchi  Cardiovascular: S1, S2, RRR  Gastrointestinal: BS+, soft, NT/ND  Extremities: No cyanosis or clubbing. No peripheral edema  :  No schneider.   Skin: No rashes    LABS:  10-10    133[L]  |  93[L]  |  38[H]  ----------------------------<  157[H]  4.2   |  28  |  5.1[HH]    Ca    7.9[L]      10 Oct 2024 11:25    TPro  6.4  /  Alb  2.9[L]  /  TBili  0.4  /  DBili      /  AST  7   /  ALT  <5  /  AlkPhos  184[H]  10-10                          7.3    5.60  )-----------( 209      ( 10 Oct 2024 11:25 )             22.7       Urine Studies:  Urinalysis Basic - ( 10 Oct 2024 11:25 )    Color:  / Appearance:  / SG:  / pH:   Gluc: 157 mg/dL / Ketone:   / Bili:  / Urobili:    Blood:  / Protein:  / Nitrite:    Leuk Esterase:  / RBC:  / WBC    Sq Epi:  / Non Sq Epi:  / Bacteria:         RADIOLOGY & ADDITIONAL STUDIES:

## 2024-10-11 NOTE — PROGRESS NOTE ADULT - ASSESSMENT
64-year-old male PMH ESRD on dialysis M/T/Th/F via AVF, NIDDM, BPH, CAD status post CABG, HTN, CHF, TIA, PAD, left toe ulcer status post amputation presents to the ED for evaluation of low hemoglobin  6.9 on blood work done at facility.    #ESRD on HD (M-T-Th-F)  #Normocytic anemia likely 2/2 ESRD, Anemia of chronic inflammation.  -Iron studies reviewed.     Plan:  received PRBC and HD yesterday  repeat CBC pedning  Phosphorus and PTH reviewed at goal.   can be dc'd if otherwise stable

## 2024-10-15 DIAGNOSIS — R18.8 OTHER ASCITES: ICD-10-CM

## 2024-10-15 DIAGNOSIS — I25.10 ATHEROSCLEROTIC HEART DISEASE OF NATIVE CORONARY ARTERY WITHOUT ANGINA PECTORIS: ICD-10-CM

## 2024-10-15 DIAGNOSIS — N18.6 END STAGE RENAL DISEASE: ICD-10-CM

## 2024-10-15 DIAGNOSIS — I25.2 OLD MYOCARDIAL INFARCTION: ICD-10-CM

## 2024-10-15 DIAGNOSIS — E11.43 TYPE 2 DIABETES MELLITUS WITH DIABETIC AUTONOMIC (POLY)NEUROPATHY: ICD-10-CM

## 2024-10-15 DIAGNOSIS — D63.1 ANEMIA IN CHRONIC KIDNEY DISEASE: ICD-10-CM

## 2024-10-15 DIAGNOSIS — Z89.422 ACQUIRED ABSENCE OF OTHER LEFT TOE(S): ICD-10-CM

## 2024-10-15 DIAGNOSIS — N40.0 BENIGN PROSTATIC HYPERPLASIA WITHOUT LOWER URINARY TRACT SYMPTOMS: ICD-10-CM

## 2024-10-15 DIAGNOSIS — Z79.01 LONG TERM (CURRENT) USE OF ANTICOAGULANTS: ICD-10-CM

## 2024-10-15 DIAGNOSIS — E83.42 HYPOMAGNESEMIA: ICD-10-CM

## 2024-10-15 DIAGNOSIS — E11.319 TYPE 2 DIABETES MELLITUS WITH UNSPECIFIED DIABETIC RETINOPATHY WITHOUT MACULAR EDEMA: ICD-10-CM

## 2024-10-15 DIAGNOSIS — K40.90 UNILATERAL INGUINAL HERNIA, WITHOUT OBSTRUCTION OR GANGRENE, NOT SPECIFIED AS RECURRENT: ICD-10-CM

## 2024-10-15 DIAGNOSIS — I50.22 CHRONIC SYSTOLIC (CONGESTIVE) HEART FAILURE: ICD-10-CM

## 2024-10-15 DIAGNOSIS — I95.1 ORTHOSTATIC HYPOTENSION: ICD-10-CM

## 2024-10-15 DIAGNOSIS — E78.5 HYPERLIPIDEMIA, UNSPECIFIED: ICD-10-CM

## 2024-10-15 DIAGNOSIS — Z79.4 LONG TERM (CURRENT) USE OF INSULIN: ICD-10-CM

## 2024-10-15 DIAGNOSIS — Z86.73 PERSONAL HISTORY OF TRANSIENT ISCHEMIC ATTACK (TIA), AND CEREBRAL INFARCTION WITHOUT RESIDUAL DEFICITS: ICD-10-CM

## 2024-10-15 DIAGNOSIS — E11.22 TYPE 2 DIABETES MELLITUS WITH DIABETIC CHRONIC KIDNEY DISEASE: ICD-10-CM

## 2024-10-15 DIAGNOSIS — Z79.82 LONG TERM (CURRENT) USE OF ASPIRIN: ICD-10-CM

## 2024-10-15 DIAGNOSIS — E11.51 TYPE 2 DIABETES MELLITUS WITH DIABETIC PERIPHERAL ANGIOPATHY WITHOUT GANGRENE: ICD-10-CM

## 2024-10-15 DIAGNOSIS — K31.84 GASTROPARESIS: ICD-10-CM

## 2024-10-15 DIAGNOSIS — Z99.2 DEPENDENCE ON RENAL DIALYSIS: ICD-10-CM

## 2024-10-15 DIAGNOSIS — I13.2 HYPERTENSIVE HEART AND CHRONIC KIDNEY DISEASE WITH HEART FAILURE AND WITH STAGE 5 CHRONIC KIDNEY DISEASE, OR END STAGE RENAL DISEASE: ICD-10-CM

## 2024-10-15 DIAGNOSIS — K59.09 OTHER CONSTIPATION: ICD-10-CM

## 2024-10-15 DIAGNOSIS — Z95.5 PRESENCE OF CORONARY ANGIOPLASTY IMPLANT AND GRAFT: ICD-10-CM

## 2024-10-15 DIAGNOSIS — Z95.820 PERIPHERAL VASCULAR ANGIOPLASTY STATUS WITH IMPLANTS AND GRAFTS: ICD-10-CM

## 2024-10-16 ENCOUNTER — OUTPATIENT (OUTPATIENT)
Dept: OUTPATIENT SERVICES | Facility: HOSPITAL | Age: 64
LOS: 1 days | End: 2024-10-16
Payer: MEDICARE

## 2024-10-16 ENCOUNTER — APPOINTMENT (OUTPATIENT)
Dept: PODIATRY | Facility: CLINIC | Age: 64
End: 2024-10-16
Payer: MEDICARE

## 2024-10-16 DIAGNOSIS — Z89.429 ACQUIRED ABSENCE OF OTHER TOE(S), UNSPECIFIED SIDE: ICD-10-CM

## 2024-10-16 DIAGNOSIS — Z00.00 ENCOUNTER FOR GENERAL ADULT MEDICAL EXAMINATION WITHOUT ABNORMAL FINDINGS: ICD-10-CM

## 2024-10-16 DIAGNOSIS — Z98.890 OTHER SPECIFIED POSTPROCEDURAL STATES: Chronic | ICD-10-CM

## 2024-10-16 DIAGNOSIS — L60.2 ONYCHOGRYPHOSIS: ICD-10-CM

## 2024-10-16 DIAGNOSIS — L97.821 NON-PRESSURE CHRONIC ULCER OF OTHER PART OF LEFT LOWER LEG LIMITED TO BREAKDOWN OF SKIN: ICD-10-CM

## 2024-10-16 DIAGNOSIS — Z95.1 PRESENCE OF AORTOCORONARY BYPASS GRAFT: Chronic | ICD-10-CM

## 2024-10-16 DIAGNOSIS — M79.89 OTHER SPECIFIED SOFT TISSUE DISORDERS: ICD-10-CM

## 2024-10-16 DIAGNOSIS — L60.3 NAIL DYSTROPHY: ICD-10-CM

## 2024-10-16 DIAGNOSIS — M14.60 CHARCOT'S JOINT, UNSPECIFIED SITE: ICD-10-CM

## 2024-10-16 DIAGNOSIS — E11.42 TYPE 2 DIABETES MELLITUS WITH DIABETIC POLYNEUROPATHY: ICD-10-CM

## 2024-10-16 DIAGNOSIS — Z95.828 PRESENCE OF OTHER VASCULAR IMPLANTS AND GRAFTS: Chronic | ICD-10-CM

## 2024-10-16 DIAGNOSIS — L97.811 NON-PRESSURE CHRONIC ULCER OF OTHER PART OF RIGHT LOWER LEG LIMITED TO BREAKDOWN OF SKIN: ICD-10-CM

## 2024-10-16 DIAGNOSIS — I77.0 ARTERIOVENOUS FISTULA, ACQUIRED: Chronic | ICD-10-CM

## 2024-10-16 PROCEDURE — 11721 DEBRIDE NAIL 6 OR MORE: CPT

## 2024-10-16 PROCEDURE — 99215 OFFICE O/P EST HI 40 MIN: CPT | Mod: 25

## 2024-10-16 PROCEDURE — 99215 OFFICE O/P EST HI 40 MIN: CPT | Mod: 25,95

## 2024-10-17 ENCOUNTER — EMERGENCY (EMERGENCY)
Facility: HOSPITAL | Age: 64
LOS: 0 days | Discharge: HOME | End: 2024-10-18
Attending: EMERGENCY MEDICINE
Payer: MEDICARE

## 2024-10-17 VITALS
HEIGHT: 71 IN | TEMPERATURE: 98 F | WEIGHT: 169.09 LBS | HEART RATE: 64 BPM | RESPIRATION RATE: 18 BRPM | DIASTOLIC BLOOD PRESSURE: 53 MMHG | OXYGEN SATURATION: 98 % | SYSTOLIC BLOOD PRESSURE: 125 MMHG

## 2024-10-17 DIAGNOSIS — I77.0 ARTERIOVENOUS FISTULA, ACQUIRED: Chronic | ICD-10-CM

## 2024-10-17 DIAGNOSIS — Z95.1 PRESENCE OF AORTOCORONARY BYPASS GRAFT: Chronic | ICD-10-CM

## 2024-10-17 DIAGNOSIS — Z95.828 PRESENCE OF OTHER VASCULAR IMPLANTS AND GRAFTS: Chronic | ICD-10-CM

## 2024-10-17 DIAGNOSIS — Z98.890 OTHER SPECIFIED POSTPROCEDURAL STATES: Chronic | ICD-10-CM

## 2024-10-17 LAB
ANION GAP SERPL CALC-SCNC: 9 MMOL/L — SIGNIFICANT CHANGE UP (ref 7–14)
BASOPHILS # BLD AUTO: 0 K/UL — SIGNIFICANT CHANGE UP (ref 0–0.2)
BASOPHILS NFR BLD AUTO: 0 % — SIGNIFICANT CHANGE UP (ref 0–1)
BLD GP AB SCN SERPL QL: SIGNIFICANT CHANGE UP
BUN SERPL-MCNC: 39 MG/DL — HIGH (ref 10–20)
BURR CELLS BLD QL SMEAR: PRESENT — SIGNIFICANT CHANGE UP
CALCIUM SERPL-MCNC: 7.7 MG/DL — LOW (ref 8.4–10.5)
CHLORIDE SERPL-SCNC: 93 MMOL/L — LOW (ref 98–110)
CO2 SERPL-SCNC: 32 MMOL/L — SIGNIFICANT CHANGE UP (ref 17–32)
CREAT SERPL-MCNC: 4.5 MG/DL — CRITICAL HIGH (ref 0.7–1.5)
EGFR: 14 ML/MIN/1.73M2 — LOW
EOSINOPHIL # BLD AUTO: 0.37 K/UL — SIGNIFICANT CHANGE UP (ref 0–0.7)
EOSINOPHIL NFR BLD AUTO: 3.5 % — SIGNIFICANT CHANGE UP (ref 0–8)
GLUCOSE SERPL-MCNC: 139 MG/DL — HIGH (ref 70–99)
HCT VFR BLD CALC: 20 % — LOW (ref 42–52)
HGB BLD-MCNC: 6.5 G/DL — CRITICAL LOW (ref 14–18)
LYMPHOCYTES # BLD AUTO: 0.37 K/UL — LOW (ref 1.2–3.4)
LYMPHOCYTES # BLD AUTO: 3.5 % — LOW (ref 20.5–51.1)
MANUAL SMEAR VERIFICATION: SIGNIFICANT CHANGE UP
MCHC RBC-ENTMCNC: 26.8 PG — LOW (ref 27–31)
MCHC RBC-ENTMCNC: 32.5 G/DL — SIGNIFICANT CHANGE UP (ref 32–37)
MCV RBC AUTO: 82.6 FL — SIGNIFICANT CHANGE UP (ref 80–94)
MONOCYTES # BLD AUTO: 0.65 K/UL — HIGH (ref 0.1–0.6)
MONOCYTES NFR BLD AUTO: 6.1 % — SIGNIFICANT CHANGE UP (ref 1.7–9.3)
NEUTROPHILS # BLD AUTO: 8.92 K/UL — HIGH (ref 1.4–6.5)
NEUTROPHILS NFR BLD AUTO: 83.5 % — HIGH (ref 42.2–75.2)
NEUTS BAND # BLD: 0.8 % — SIGNIFICANT CHANGE UP (ref 0–6)
OVALOCYTES BLD QL SMEAR: SLIGHT — SIGNIFICANT CHANGE UP
PLAT MORPH BLD: NORMAL — SIGNIFICANT CHANGE UP
PLATELET # BLD AUTO: 295 K/UL — SIGNIFICANT CHANGE UP (ref 130–400)
PMV BLD: 10.8 FL — HIGH (ref 7.4–10.4)
POIKILOCYTOSIS BLD QL AUTO: SLIGHT — SIGNIFICANT CHANGE UP
POLYCHROMASIA BLD QL SMEAR: SLIGHT — SIGNIFICANT CHANGE UP
POTASSIUM SERPL-MCNC: 3.6 MMOL/L — SIGNIFICANT CHANGE UP (ref 3.5–5)
POTASSIUM SERPL-SCNC: 3.6 MMOL/L — SIGNIFICANT CHANGE UP (ref 3.5–5)
RBC # BLD: 2.35 M/UL — LOW (ref 4.7–6.1)
RBC # FLD: 17.4 % — HIGH (ref 11.5–14.5)
RBC BLD AUTO: ABNORMAL
SCHISTOCYTES BLD QL AUTO: SLIGHT — SIGNIFICANT CHANGE UP
SODIUM SERPL-SCNC: 134 MMOL/L — LOW (ref 135–146)
VARIANT LYMPHS # BLD: 2.6 % — SIGNIFICANT CHANGE UP (ref 0–5)
WBC # BLD: 10.58 K/UL — SIGNIFICANT CHANGE UP (ref 4.8–10.8)
WBC # FLD AUTO: 10.58 K/UL — SIGNIFICANT CHANGE UP (ref 4.8–10.8)

## 2024-10-17 PROCEDURE — 90935 HEMODIALYSIS ONE EVALUATION: CPT

## 2024-10-17 PROCEDURE — 96374 THER/PROPH/DIAG INJ IV PUSH: CPT

## 2024-10-17 PROCEDURE — 36415 COLL VENOUS BLD VENIPUNCTURE: CPT

## 2024-10-17 PROCEDURE — 36430 TRANSFUSION BLD/BLD COMPNT: CPT

## 2024-10-17 PROCEDURE — G0378: CPT

## 2024-10-17 PROCEDURE — 85025 COMPLETE CBC W/AUTO DIFF WBC: CPT

## 2024-10-17 PROCEDURE — 86900 BLOOD TYPING SEROLOGIC ABO: CPT

## 2024-10-17 PROCEDURE — 86923 COMPATIBILITY TEST ELECTRIC: CPT

## 2024-10-17 PROCEDURE — P9016: CPT

## 2024-10-17 PROCEDURE — 99223 1ST HOSP IP/OBS HIGH 75: CPT | Mod: FS

## 2024-10-17 PROCEDURE — 99284 EMERGENCY DEPT VISIT MOD MDM: CPT | Mod: 25

## 2024-10-17 PROCEDURE — 86901 BLOOD TYPING SEROLOGIC RH(D): CPT

## 2024-10-17 PROCEDURE — 86850 RBC ANTIBODY SCREEN: CPT

## 2024-10-17 PROCEDURE — 80048 BASIC METABOLIC PNL TOTAL CA: CPT

## 2024-10-17 RX ORDER — SODIUM CHLORIDE IRRIG SOLUTION 0.9 %
1000 SOLUTION, IRRIGATION IRRIGATION ONCE
Refills: 0 | Status: DISCONTINUED | OUTPATIENT
Start: 2024-10-17 | End: 2024-10-17

## 2024-10-17 RX ADMIN — Medication 200 GRAM(S): at 17:48

## 2024-10-17 NOTE — ED PROVIDER NOTE - CLINICAL SUMMARY MEDICAL DECISION MAKING FREE TEXT BOX
64-year-old male PMH ESRD on dialysis M/T/Th/F (last dialyzed on 10/15) with Chronic Anemia, NIDDM, BPH, CAD status post CABG, HTN, CHF, TIA, PAD, left toe ulcer status post amputation, currently residing at [facility], presents to the ED for evaluation of low hemoglobin. Patient frequently has episodes requiring transfusions patient denies any source of bleeding.Did not get his dialysis today.Today is 6.5.  He is also very hypocalcemia likely in the setting of frequent blood transfusions.  Planning to transfuse 2 units and also replete calcium.  Patient placed in ops pending transfusion and possible hemodialysis

## 2024-10-17 NOTE — ED ADULT TRIAGE NOTE - NS ED NURSE AMBULANCES
[facility] , lives in shelter since 10/18. Denies smoking or drug abuse, drinks etoh occasionally on holidays.

## 2024-10-17 NOTE — ED ADULT NURSE NOTE - NSFALLHARMRISKINTERV_ED_ALL_ED

## 2024-10-17 NOTE — ED ADULT NURSE NOTE - NSICDXPASTMEDICALHX_GEN_ALL_CORE_FT
PAST MEDICAL HISTORY:  BPH (benign prostatic hyperplasia)     Chronic anemia     Chronic kidney disease (CKD) Stage IV    Diabetes mellitus     History of medical problems left arm precaution    HLD (hyperlipidemia)     [place] (hard of hearing)     Hypertension     Myocardial infarction 2012    OA (osteoarthritis)     PAD (peripheral artery disease) S/p bypass left leg    Pain in left knee s/p fall    S/P CABG (coronary artery bypass graft) x4    Stented coronary artery in 2008    Transient ischemic attack (TIA) 2017; 2008

## 2024-10-17 NOTE — ED PROVIDER NOTE - NSICDXPASTMEDICALHX_GEN_ALL_CORE_FT
PAST MEDICAL HISTORY:  BPH (benign prostatic hyperplasia)     Chronic anemia     Chronic kidney disease (CKD) Stage IV    Diabetes mellitus     History of medical problems left arm precaution    HLD (hyperlipidemia)     [place] (hard of hearing)     Hypertension     Myocardial infarction 2012    OA (osteoarthritis)     PAD (peripheral artery disease) S/p bypass left leg    Pain in left knee s/p fall    S/P CABG (coronary artery bypass graft) x4    Stented coronary artery in 2008    Transient ischemic attack (TIA) 2017; 2008

## 2024-10-17 NOTE — ED PROVIDER NOTE - PHYSICAL EXAMINATION
Physical Exam    Vital Signs: I have reviewed the initial vital signs.    Constitutional: appears in NAD, pale appearing  HEENT: Conjunctiva pale, Sclera clear; Moist mucous membranes   Cardiovascular: S1 and S2, regular and rhythm, no murmurs noted  Respiratory: unlabored respiratory effort, clear to auscultation bilaterally   Gastrointestinal: Abdomen is soft, non-distended. Diffuse left sided abdominal tenderness.   Integumentary: Warm, dry  Neurologic: awake and alert  Psychiatric: appropriate mood, appropriate affect

## 2024-10-17 NOTE — ED PROVIDER NOTE - OBJECTIVE STATEMENT
64-year-old male PMH ESRD on dialysis M/T/Th/F (last dialyzed on 10/15) with Chronic Anemia, NIDDM, BPH, CAD status post CABG, HTN, CHF, TIA, PAD, left toe ulcer status post amputation, currently residing at [facility], presents to the ED for evaluation of low hemoglobin prior to dialysis this AM. Patient was recently seen in ED on 10/10 for similar episode. Endorses last BM 1 week ago. Denies difficulty urinating. He currently denies any fevers, lightheadedness, chest pain, shortness of breath, abdominal pain. Follows with Dr. Reeves for Nephrology.

## 2024-10-17 NOTE — ED CDU PROVIDER INITIAL DAY NOTE - NSICDXPASTMEDICALHX_GEN_ALL_CORE_FT
PAST MEDICAL HISTORY:  BPH (benign prostatic hyperplasia)     Chronic anemia     Chronic kidney disease (CKD) Stage IV    Diabetes mellitus     History of medical problems left arm precaution    HLD (hyperlipidemia)     [place] (hard of hearing)     Hypertension     Myocardial infarction 2012    OA (osteoarthritis)     PAD (peripheral artery disease) S/p bypass left leg    Pain in left knee s/p fall    S/P CABG (coronary artery bypass graft) x4    Stented coronary artery in 2008    Transient ischemic attack (TIA) 2017; 2008

## 2024-10-17 NOTE — ED CDU PROVIDER INITIAL DAY NOTE - CLINICAL SUMMARY MEDICAL DECISION MAKING FREE TEXT BOX
plan for blood transufsion and renal consult for possible HD. patient missed session today.  pt has hx chronic anemia and esrd. no source of active bleed.

## 2024-10-17 NOTE — ED CDU PROVIDER INITIAL DAY NOTE - OBJECTIVE STATEMENT
64-year-old male PMH ESRD on dialysis M/T/Th/F (last dialyzed on 10/15) with Chronic Anemia, NIDDM, BPH, CAD status post CABG, HTN, CHF, TIA, PAD, left toe ulcer status post amputation, currently residing at [facility], presents to the ED for evaluation of low hemoglobin prior to dialysis this AM. Patient was recently seen in ED on 10/10 for similar episode. Endorses last BM 1 week ago. Denies difficulty urinating. He currently denies any fevers, lightheadedness, chest pain, shortness of breath, abdominal pain. Follows with Dr. Reeves for Nephrology.

## 2024-10-17 NOTE — ED PROVIDER NOTE - PROGRESS NOTE DETAILS
ck: spoke with dr wero bourgeois who is aware of patient and recommends dialysis tomorrow am. recommends 1 unit prbc and dialysis in the morning.

## 2024-10-18 VITALS
DIASTOLIC BLOOD PRESSURE: 76 MMHG | SYSTOLIC BLOOD PRESSURE: 148 MMHG | RESPIRATION RATE: 16 BRPM | OXYGEN SATURATION: 100 % | HEART RATE: 72 BPM | TEMPERATURE: 98 F

## 2024-10-18 PROCEDURE — 99239 HOSP IP/OBS DSCHRG MGMT >30: CPT

## 2024-10-18 RX ORDER — METOPROLOL TARTRATE 50 MG
25 TABLET ORAL DAILY
Refills: 0 | Status: DISCONTINUED | OUTPATIENT
Start: 2024-10-18 | End: 2024-10-18

## 2024-10-18 RX ORDER — ASPIRIN 325 MG
81 TABLET ORAL DAILY
Refills: 0 | Status: DISCONTINUED | OUTPATIENT
Start: 2024-10-18 | End: 2024-10-18

## 2024-10-18 RX ORDER — ATORVASTATIN CALCIUM 10 MG/1
40 TABLET, FILM COATED ORAL AT BEDTIME
Refills: 0 | Status: DISCONTINUED | OUTPATIENT
Start: 2024-10-18 | End: 2024-10-18

## 2024-10-18 RX ORDER — TAMSULOSIN HCL 0.4 MG
0.4 CAPSULE ORAL AT BEDTIME
Refills: 0 | Status: DISCONTINUED | OUTPATIENT
Start: 2024-10-18 | End: 2024-10-18

## 2024-10-18 RX ORDER — PANTOPRAZOLE SODIUM 40 MG/1
40 TABLET, DELAYED RELEASE ORAL
Refills: 0 | Status: DISCONTINUED | OUTPATIENT
Start: 2024-10-18 | End: 2024-10-18

## 2024-10-18 RX ADMIN — Medication 25 MILLIGRAM(S): at 05:33

## 2024-10-18 RX ADMIN — PANTOPRAZOLE SODIUM 40 MILLIGRAM(S): 40 TABLET, DELAYED RELEASE ORAL at 05:33

## 2024-10-18 NOTE — ED CDU PROVIDER DISPOSITION NOTE - PATIENT PORTAL LINK FT
You can access the FollowMyHealth Patient Portal offered by [facility] by registering at the following website: [email or web address]. By joining [employer]’s FollowMyHealth portal, you will also be able to view your health information using other applications (apps) compatible with our system.

## 2024-10-18 NOTE — ED CDU PROVIDER DISPOSITION NOTE - CLINICAL COURSE
64-year-old man, patient requiring frequent transfusions sent in for low H&H.  Patient with no complaints.  ED workup confirmed 6.5 hemoglobin.  Patient was placed in CDU for transfusion during dialysis.  Vital signs, exam as noted, patient is comfortable on my eval.  He was transfused without incident and discharged to follow-up with his nephrologist.

## 2024-10-18 NOTE — ED CDU PROVIDER SUBSEQUENT DAY NOTE - ATTENDING APP SHARED VISIT CONTRIBUTION OF CARE
I saw and evaluated the patient on my own.  Briefly, I have the following impression and plan... patient in obs for acute on chronic anemia requiring tranfusion and dialysis. no acute event. nephro wants 1 u prior to dualysis and 1 u after  Please see MDM for further details.

## 2024-10-18 NOTE — ED CDU PROVIDER SUBSEQUENT DAY NOTE - HISTORY
64 year old male, past medical history esrd on hd m/t/th/f with chronic anemia requiring transfusions, niddm, bph, cad s/p cabg, htn, chf, tia, pad, bib ems from nursing home with anemia. patient with blood work performed this morning significant for hemoglobin <7. patient did not receive dialysis today. discussed case with nephro who recommends dialysis in the morning.

## 2024-10-18 NOTE — ED CDU PROVIDER SUBSEQUENT DAY NOTE - PHYSICAL EXAMINATION
CONSTITUTIONAL: chronically ill appearing male, nad  SKIN: skin exam is warm and dry,  HEAD: Normocephalic; atraumatic.  EYES: PERRL, conjunctiva and sclera clear.  ENT: MMM  NECK: ROM intact.  CARD: S1, S2 normal, no murmur  RESP: Good air movement bilaterally  ABD: soft; non-distended; non-tender.   EXT: Normal ROM.  NEURO: awake, following commands, No Focal deficits. GCS 15.   PSYCH: Cooperative

## 2024-10-18 NOTE — CONSULT NOTE ADULT - SUBJECTIVE AND OBJECTIVE BOX
NEPHROLOGY CONSULTATION NOTE    64-year-old male PMH ESRD on dialysis M/T/Th/F (last dialyzed on 10/15) with Chronic Anemia, NIDDM, BPH, CAD status post CABG, HTN, CHF, TIA, PAD, left toe ulcer status post amputation, currently residing at [facility], presents to the ED for evaluation of low hemoglobin prior to dialysis this AM. Patient was recently seen in ED on 10/10 for similar episode. Endorses last BM 1 week ago. Denies difficulty urinating. He currently denies any fevers, lightheadedness, chest pain, shortness of breath, abdominal pain.  sp transfusion last night for anemia acute  denied melena no BRBPR no hematemesis  Seen during HD ttt    PAST MEDICAL & SURGICAL HISTORY:  S/P CABG (coronary artery bypass graft) x4  Diabetes mellitus  Transient ischemic attack (TIA) 2017; 2008  Chronic kidney disease (CKD) Stage IV  Hypertension  Stented coronary artery in 2008  Myocardial infarction 2012  PAD (peripheral artery disease)  S/p bypass left leg  HLD (hyperlipidemia)  BPH (benign prostatic hyperplasia)  Pain in left knee s/p fall  OA (osteoarthritis)  [place] (hard of hearing)  Chronic anemia  History of medical problems left arm precaution  S/P CABG (coronary artery bypass graft) 2012  H/O arterial bypass of lower limb  Left Lower Extremity (2016)  History of surgery  Left CEA (2017)  Left Pinkie toe Amputation (2014)  CABG x 4 (2012)  Card cath - stent (2008)      Patient is a 64-year-old male PMH ESRD on dialysis M/T/Th/F (last dialyzed on 10/8) through AVF, NIDDM, BPH, CAD status post CABG, HTN, CHF, TIA, PAD, left toe ulcer status post amputation presents to the ED for evaluation of low hemoglobin and for dialysis.  Patient presents from [facility], found to have a hemoglobin of 6.9 on blood work done at facility.    #ESRD on HD (M-T-Th-F)  #Normocytic anemia likely 2/2 ESRD, Anemia of chronic inflammation.  -Iron studies reviewed.     Plan:  Monitor electrolytes and correct as needed.   HD for 3 hours, 3K bath and 2L UF as tolerated.   Phosphorus and PTH reviewed at goal.   Monitor HH, hb below 8 and symptomatic, transfuse 1 unit.   Adjust all medications to GFR.   Nephrology will follow.   AV fistula  2019  LEFT AV FISTULA        Allergies:  No Known Allergies    Home Medications Reviewed  Hospital Medications:   MEDICATIONS  (STANDING):  aspirin  chewable 81 milliGRAM(s) Oral daily  atorvastatin 40 milliGRAM(s) Oral at bedtime  metoprolol succinate ER 25 milliGRAM(s) Oral daily  pantoprazole    Tablet 40 milliGRAM(s) Oral before breakfast  tamsulosin 0.4 milliGRAM(s) Oral at bedtime      SOCIAL HISTORY:  Denies ETOH,Smoking,   FAMILY HISTORY:  Family history of heart disease (Father)    DM (diabetes mellitus) (Mother)    ESRD (end stage renal disease) on dialysis (Mother)          REVIEW OF SYSTEMS:  CONSTITUTIONAL: No weakness, fevers or chills  EYES/ENT: No visual changes;  No vertigo or throat pain   NECK: No pain or stiffness  RESPIRATORY: No cough, wheezing, hemoptysis; No shortness of breath  CARDIOVASCULAR: No chest pain or palpitations.  GASTROINTESTINAL: No abdominal or epigastric pain. No nausea, vomiting, or hematemesis; No diarrhea or constipation. No melena or hematochezia.  GENITOURINARY: No dysuria, frequency, foamy urine, urinary urgency, incontinence or hematuria  NEUROLOGICAL: No numbness or weakness  SKIN: No itching, burning, rashes, or lesions   VASCULAR: No bilateral lower extremity edema.   All other review of systems is negative unless indicated above.    VITALS:  T(F): 98 (10-18-24 @ 00:47), Max: 98.7 (10-17-24 @ 21:31)  HR: 78 (10-18-24 @ 07:56)  BP: 142/72 (10-18-24 @ 07:56)  RR: 18 (10-18-24 @ 07:56)  SpO2: 100% (10-18-24 @ 07:56)    Height (cm): 180.3 (10-17 @ 14:46)  Weight (kg): 76.7 (10-17 @ 14:46)  BMI (kg/m2): 23.6 (10-17 @ 14:46)  BSA (m2): 1.96 (10-17 @ 14:46)    I&O's Detail        PHYSICAL EXAM:  Constitutional: NAD  HEENT: anicteric sclera, oropharynx clear, MMM  Neck: No JVD  Respiratory: CTAB, no wheezes, rales or rhonchi  Cardiovascular: S1, S2, RRR  Gastrointestinal: BS+, soft, NT/ND  Extremities: No cyanosis or clubbing. No peripheral edema  Neurological: A/O x 3, no focal deficits  Psychiatric: Normal mood, normal affect  : No CVA tenderness. No schneider.   Skin: No rashes  Vascular Access:    LABS:  10-17    134[L]  |  93[L]  |  39[H]  ----------------------------<  139[H]  3.6   |  32  |  4.5[HH]    Ca    7.7[L]      17 Oct 2024 16:04      Creatinine Trend: 4.5 <--, 5.1 <--, 4.8 <--, 4.5 <--, 3.7 <--, 5.3 <--, 4.4 <--, 5.4 <--, 4.3 <--, 5.3 <--, 4.6 <--, 5.2 <--, 5.1 <--                        6.5    10.58 )-----------( 295      ( 17 Oct 2024 16:04 )             20.0     Urine Studies:  Urinalysis Basic - ( 17 Oct 2024 16:04 )    Color:  / Appearance:  / SG:  / pH:   Gluc: 139 mg/dL / Ketone:   / Bili:  / Urobili:    Blood:  / Protein:  / Nitrite:    Leuk Esterase:  / RBC:  / WBC    Sq Epi:  / Non Sq Epi:  / Bacteria:           09-24 @ 06:56  7.8  135  --    Iron 93, TIBC --, %sat --      [10-01-24 @ 07:00]  Ferritin 1129      [10-01-24 @ 07:00]  PTH -- (Ca 7.8)      [09-24-24 @ 06:56]   135  Vitamin D (25OH) 15      [09-25-24 @ 06:49]  HbA1c 5.8      [01-30-20 @ 06:46]    HBsAb <3.0      [12-29-19 @ 17:44]  HBsAb Nonreact      [11-03-22 @ 06:40]  HBsAg Nonreact      [11-03-22 @ 06:40]  HBcAb Nonreact      [11-03-22 @ 06:40]  HCV 0.14, Nonreact      [10-27-22 @ 04:09]        RADIOLOGY & ADDITIONAL STUDIES:

## 2024-10-18 NOTE — CONSULT NOTE ADULT - ASSESSMENT
Patient is a 64-year-old male PMH ESRD on dialysis M/T/Th/F (last dialyzed on 10/8) through AVF, NIDDM, BPH, CAD status post CABG, HTN, CHF, TIA, PAD, left toe ulcer status post amputation presents to the ED for evaluation of low hemoglobin and for dialysis.  Patient presents from [facility], found to have a hemoglobin of 6.9 on blood work done at facility.    #ESRD on HD (M-T-Th-F)  #Normocytic anemia likely 2/2 ESRD, Anemia of chronic inflammation.  -Iron studies reviewed.     Plan:  Monitor electrolytes and correct as needed.   HD for 3 hours, 3K bath and 2L UF as tolerated.   Phosphorus and PTH reviewed at goal.   Monitor HH, sp transfusion will need 2 PRBC  / on RAMSEY as OP will need GI follow up if stable can be done as OP   Adjust all medications to GFR.   Nephrology will follow.

## 2024-10-18 NOTE — ED CDU PROVIDER SUBSEQUENT DAY NOTE - NSICDXPASTMEDICALHX_GEN_ALL_CORE_FT
PAST MEDICAL HISTORY:  BPH (benign prostatic hyperplasia)     Chronic anemia     Chronic kidney disease (CKD) Stage IV    Diabetes mellitus     History of medical problems left arm precaution    HLD (hyperlipidemia)     [place] (hard of hearing)     Hypertension     Myocardial infarction 2012    OA (osteoarthritis)     PAD (peripheral artery disease) S/p bypass left leg    Pain in left knee s/p fall    S/P CABG (coronary artery bypass graft) x4    Stented coronary artery in 2008    Transient ischemic attack (TIA) 2017; 2008

## 2024-10-23 ENCOUNTER — APPOINTMENT (OUTPATIENT)
Dept: GASTROENTEROLOGY | Facility: CLINIC | Age: 64
End: 2024-10-23

## 2024-10-23 LAB
CULTURE RESULTS: SIGNIFICANT CHANGE UP
SPECIMEN SOURCE: SIGNIFICANT CHANGE UP

## 2024-10-31 DIAGNOSIS — L97.811 NON-PRESSURE CHRONIC ULCER OF OTHER PART OF RIGHT LOWER LEG LIMITED TO BREAKDOWN OF SKIN: ICD-10-CM
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
## 2024-10-31 DIAGNOSIS — L60.3 NAIL DYSTROPHY: ICD-10-CM

## 2024-10-31 DIAGNOSIS — M79.89 OTHER SPECIFIED SOFT TISSUE DISORDERS: ICD-10-CM

## 2024-10-31 DIAGNOSIS — L97.821 NON-PRESSURE CHRONIC ULCER OF OTHER PART OF LEFT LOWER LEG LIMITED TO BREAKDOWN OF SKIN: ICD-10-CM

## 2024-10-31 DIAGNOSIS — M14.60 CHARCOT'S JOINT, UNSPECIFIED SITE: ICD-10-CM

## 2024-10-31 DIAGNOSIS — E11.42 TYPE 2 DIABETES MELLITUS WITH DIABETIC POLYNEUROPATHY: ICD-10-CM

## 2024-10-31 DIAGNOSIS — L60.2 ONYCHOGRYPHOSIS: ICD-10-CM
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
## 2024-10-31 DIAGNOSIS — Z89.429 ACQUIRED ABSENCE OF OTHER TOE(S), UNSPECIFIED SIDE: ICD-10-CM

## 2024-11-06 ENCOUNTER — APPOINTMENT (OUTPATIENT)
Dept: PODIATRY | Facility: CLINIC | Age: 64
End: 2024-11-06

## 2024-11-13 NOTE — ASU PREOP CHECKLIST - BP NONINVASIVE DIASTOLIC (MM HG)
schedule a follow-up appointment to discuss and select an alternate course of therapy including possibly injection or surgical treatment.       Mr. Zen Galvin has been given a full verbal list of instructions and precautions related to his present condition.  I have asked him to followup with me in the office at the prescribed time. He is also specifically requested to call or return to the office sooner if his symptoms change or worsen prior to the next scheduled appointment.      
65

## 2024-11-20 ENCOUNTER — APPOINTMENT (OUTPATIENT)
Dept: PODIATRY | Facility: CLINIC | Age: 64
End: 2024-11-20
Payer: MEDICARE

## 2024-11-20 ENCOUNTER — OUTPATIENT (OUTPATIENT)
Dept: OUTPATIENT SERVICES | Facility: HOSPITAL | Age: 64
LOS: 1 days | End: 2024-11-20
Payer: MEDICARE

## 2024-11-20 DIAGNOSIS — E11.42 TYPE 2 DIABETES MELLITUS WITH DIABETIC POLYNEUROPATHY: ICD-10-CM

## 2024-11-20 DIAGNOSIS — Z95.1 PRESENCE OF AORTOCORONARY BYPASS GRAFT: Chronic | ICD-10-CM

## 2024-11-20 DIAGNOSIS — I77.0 ARTERIOVENOUS FISTULA, ACQUIRED: Chronic | ICD-10-CM

## 2024-11-20 DIAGNOSIS — I73.9 PERIPHERAL VASCULAR DISEASE, UNSPECIFIED: ICD-10-CM

## 2024-11-20 DIAGNOSIS — Z89.429 ACQUIRED ABSENCE OF OTHER TOE(S), UNSPECIFIED SIDE: ICD-10-CM

## 2024-11-20 DIAGNOSIS — M14.60 CHARCOT'S JOINT, UNSPECIFIED SITE: ICD-10-CM

## 2024-11-20 DIAGNOSIS — Z95.828 PRESENCE OF OTHER VASCULAR IMPLANTS AND GRAFTS: Chronic | ICD-10-CM

## 2024-11-20 DIAGNOSIS — Z00.00 ENCOUNTER FOR GENERAL ADULT MEDICAL EXAMINATION WITHOUT ABNORMAL FINDINGS: ICD-10-CM

## 2024-11-20 DIAGNOSIS — Z98.890 OTHER SPECIFIED POSTPROCEDURAL STATES: Chronic | ICD-10-CM

## 2024-11-20 PROCEDURE — 99213 OFFICE O/P EST LOW 20 MIN: CPT

## 2024-11-27 DIAGNOSIS — M14.60 CHARCOT'S JOINT, UNSPECIFIED SITE: ICD-10-CM

## 2024-11-27 DIAGNOSIS — E11.42 TYPE 2 DIABETES MELLITUS WITH DIABETIC POLYNEUROPATHY: ICD-10-CM
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
## 2024-11-27 DIAGNOSIS — I73.9 PERIPHERAL VASCULAR DISEASE, UNSPECIFIED: ICD-10-CM

## 2024-11-27 DIAGNOSIS — Z89.429 ACQUIRED ABSENCE OF OTHER TOE(S), UNSPECIFIED SIDE: ICD-10-CM

## 2024-12-18 ENCOUNTER — APPOINTMENT (OUTPATIENT)
Dept: PODIATRY | Facility: CLINIC | Age: 64
End: 2024-12-18

## 2024-12-19 NOTE — PATIENT PROFILE ADULT - TOBACCO USE
Pt was discharged with parent. Discharge instructions were given with parent. Prescriptions escribed. Vitals WNL. Stable at time of discharge. All questions were answered. No respiratory distress at time of discharge. Discharge Okay per MD.   Never smoker

## 2025-01-29 ENCOUNTER — APPOINTMENT (OUTPATIENT)
Dept: OTOLARYNGOLOGY | Facility: CLINIC | Age: 65
End: 2025-01-29

## 2025-02-26 NOTE — PATIENT PROFILE ADULT - DO YOU FEEL LIKE HURTING YOURSELF OR OTHERS?
INTERVENTIONAL RADIOLOGY PROCEDURE NOTE    Date: 2/26/2025    Procedure:   Procedure Summary       Date: 02/26/25 Room / Location: [facility] Interventional Radiology    Anesthesia Start:  Anesthesia Stop:     Procedure: IR DRAINAGE TUBE CHECK/CHANGE/REPOSITION/REINSERTION/UPSIZE Diagnosis:       Abscess of muscle      (complex pelvic/thigh collection prob abscess)    Scheduled Providers:  Responsible Provider:     Anesthesia Type: Not recorded ASA Status: Not recorded            Preoperative diagnosis:   1. Abscess of muscle         Postoperative diagnosis: Same.    Surgeon: Geni Barry MD     Assistant: None. No qualified resident was available.    Blood loss: 0    Specimens: 0     Findings:     Cavity has decreased to the size of the drainage catheter    Fibrinous material and flushing the bag with prompt leakage on injection    Patient is clinically improving and remains on antibiotics    Tube was removed without difficulty      Complications: None immediate.    Anesthesia: local  
no

## 2025-03-14 ENCOUNTER — APPOINTMENT (OUTPATIENT)
Dept: OTOLARYNGOLOGY | Facility: CLINIC | Age: 65
End: 2025-03-14

## 2025-03-14 ENCOUNTER — APPOINTMENT (OUTPATIENT)
Dept: OTOLARYNGOLOGY | Facility: CLINIC | Age: 65
End: 2025-03-14
Payer: MEDICARE

## 2025-03-14 VITALS — HEIGHT: 69 IN | WEIGHT: 185 LBS | BODY MASS INDEX: 27.4 KG/M2

## 2025-03-14 DIAGNOSIS — H66.93 OTITIS MEDIA, UNSPECIFIED, BILATERAL: ICD-10-CM

## 2025-03-14 DIAGNOSIS — H90.6 MIXED CONDUCTIVE AND SENSORINEURAL HEARING LOSS, BILATERAL: ICD-10-CM

## 2025-03-14 DIAGNOSIS — H61.23 IMPACTED CERUMEN, BILATERAL: ICD-10-CM

## 2025-03-14 PROCEDURE — 92567 TYMPANOMETRY: CPT

## 2025-03-14 PROCEDURE — 92557 COMPREHENSIVE HEARING TEST: CPT

## 2025-03-14 PROCEDURE — 99204 OFFICE O/P NEW MOD 45 MIN: CPT | Mod: 25

## 2025-03-14 PROCEDURE — G0268 REMOVAL OF IMPACTED WAX MD: CPT

## 2025-03-14 RX ORDER — FLUTICASONE PROPIONATE 50 UG/1
50 SPRAY, METERED NASAL TWICE DAILY
Qty: 1 | Refills: 9 | Status: ACTIVE | COMMUNITY
Start: 2025-03-14 | End: 1900-01-01

## 2025-03-14 RX ORDER — ASPIRIN 81 MG
6.5 TABLET, DELAYED RELEASE (ENTERIC COATED) ORAL
Qty: 1 | Refills: 11 | Status: ACTIVE | COMMUNITY
Start: 2025-03-14 | End: 1900-01-01

## 2025-03-20 NOTE — ED ADULT TRIAGE NOTE - WEIGHT IN LBS
Patient has paroxysmal (<7 days) atrial fibrillation. Patient is currently in sinus rhythm. [id number] Score: 4. The patients heart rate in the last 24 hours is as follows:  Pulse  Min: 108  Max: 131     Antiarrhythmics  diltiaZEM 24 hr capsule 300 mg, Daily, Oral  diltiaZEM tablet 30 mg, Every 6 hours, Oral    Anticoagulants       Plan  - Replete lytes with a goal of K>4, Mg >2  - Patient is not anticoagulated due to ?  - Patient's afib is currently uncontrolled. Will adjust treatment as follows: increase Dilt and monitor.   -        205.9

## 2025-03-27 NOTE — PATIENT PROFILE ADULT - FALL HARM RISK - FALL HARM RISK
Outpatient Occupational Therapy Lymphedema Initial Evaluation   Thanh     Patient Name: Anna Gilbert  : 1945  MRN: [id number]  Today's Date: 3/27/2025      Visit Date: 2025    Patient Active Problem List   Diagnosis    Hyperlipidemia    SHEILA (obstructive sleep apnea)    Aortic valve replaced    H/O mitral valve repair    Paroxysmal atrial fibrillation    Flu vaccine need    Primary osteoarthritis of left knee    Tendinopathy of right rotator cuff    Angina pectoris    NSVT (nonsustained ventricular tachycardia)    Primary osteoarthritis of right knee    Ventricular tachycardia    VT (ventricular tachycardia)    Nausea & vomiting    Acute constipation    ICD (implantable cardioverter-defibrillator) in place    Permanent atrial fibrillation    Lymphedema    Long COVID    S/P total knee arthroplasty    Lymphadenopathy    Abrasion, right lower leg, initial encounter    Mixed hyperlipidemia    Preoperative clearance        Past Medical History:   Diagnosis Date    Bradycardia     Chronic atrial fibrillation     Coronary artery disease     Essential (primary) hypertension     H/O mitral valve repair     Health care maintenance     Heart failure, unspecified     Hyperthyroidism     Hypothyroid     Localized edema     Lymphedema 2012    Mixed hyperlipidemia     NSVT (nonsustained ventricular tachycardia) 2020    SHEILA (obstructive sleep apnea) 2016    Pacemaker     PAF (paroxysmal atrial fibrillation)     Permanent atrial fibrillation     Sick sinus syndrome     Venous insufficiency (chronic) (peripheral)     Ventricular tachycardia     with ICD shock        Past Surgical History:   Procedure Laterality Date    AORTIC VALVE REPAIR/REPLACEMENT      Porcine aortic valve 21 mm    CARDIAC CATHETERIZATION  2011    CARDIAC CATHETERIZATION N/A 2021    Procedure: Left Heart Cath;  Surgeon: Nigel Egan MD;  Location:  [facility];  Service:  Cardiovascular;  Laterality: N/A;    CARDIAC CATHETERIZATION N/A 11/30/2021    Procedure: Coronary angiography;  Surgeon: Nigel Egan MD;  Location:  [facility];  Service: Cardiovascular;  Laterality: N/A;    CARDIAC CATHETERIZATION N/A 11/30/2021    Procedure: Resting Full Cycle Ratio;  Surgeon: Nigel Egan MD;  Location:  [facility];  Service: Cardiovascular;  Laterality: N/A;    CARDIAC DEFIBRILLATOR PLACEMENT  2010    CARDIAC ELECTROPHYSIOLOGY PROCEDURE N/A 12/1/2021    Procedure: ICD DC generator change---[employer];  Surgeon: Mick Perez MD;  Location:  [facility];  Service: Cardiology;  Laterality: N/A;    MITRAL VALVE REPAIR/REPLACEMENT  2010    TOTAL KNEE ARTHROPLASTY Right 7/23/2024    Procedure: TOTAL KNEE ARTHROPLASTY AND ALL ASSOCIATED PROCEDURES;  Surgeon: Danny Azevedo MD;  Location:  [facility];  Service: Orthopedics;  Laterality: Right;         Visit Dx:     ICD-10-CM ICD-9-CM   1. Lymphedema  I89.0 457.1        Patient History       Row Name 03/26/25 1100             History    Chief Complaint Difficulty Walking;Difficulty with daily activities;Pain;Swelling;Tightness  -JJ      Type of Pain Lower Extremity / Leg  -JJ      Date Current Problem(s) Began --  pt states has had B LE lymphedema symptoms for several years, had increased symptoms 2 recen LE wounds and R TKA 7-24  -JJ      Brief Description of Current Complaint B LE lymphedema symptoms. pt states L LE is worse than R LE  -JJ      Patient/Caregiver Goals Return to prior level of function;Relieve pain;Know what to do to help the symptoms;Decrease swelling  -JJ      Current Tobacco Use no  -JJ      Smoking Status na  -JJ      Patient's Rating of General Health Good  -JJ      Hand Dominance right-handed  -JJ      Occupation/sports/leisure activities works:taxes, imsurance  -JJ      How has patient tried to help current problem? compression garments and treatment  "at lymphedema clinic \"a few years ago\"  -JJ      Related/Recent Hospitalizations Yes  -JJ      Date of Hospitalization 07/29/24  -JJ      Surgery/Hospitalization R TKA  -JJ      Are you or can you be pregnant No  -JJ         Pain     Pain Location Leg  -JJ      Pain at Present 6  -JJ      Pain Frequency Constant/continuous  -JJ      Pain Description Aching  -JJ      What Performance Factors Make the Current Problem(s) WORSE? rest, compression garments  -JJ      Difficulties with recreational activities? yes, states difficulty ambulating long distances  -JJ         Fall Risk Assessment    Any falls in the past year: No  -JJ         Services    Prior Rehab/Home Health Experiences Yes  -JJ      When was the prior experience with Rehab/Home Health pt states treated by lymphedema clinic \"a few years ago\". received bandaging treatment and compression garments  -JJ      Are you currently receiving Home Health services No  -JJ      Do you plan to receive Home Health services in the near future No  -JJ         Daily Activities    Primary Language English  -JJ      Are you able to read Yes  -JJ      Are you able to write Yes  -JJ      How does patient learn best? Listening  -JJ      Teaching needs identified Home Exercise Program;Management of Condition  -JJ      Patient is concerned about/has problems with Climbing Stairs;Difficulty with self care (i.e. bathing, dressing, toileting:;Performing sports, recreation, and play activities;Standing;Walking;Transfers (getting out of a chair, bed)  -JJ      Does patient have problems with the following? None  -JJ      Barriers to learning None  -JJ      Pt Participated in POC and Goals Yes  -JJ         Safety    Are you being hurt, hit, or frightened by anyone at home or in your life? No  -JJ      Are you being neglected by a caregiver No  -JJ      Have you had any of the following issues with N/A  -JJ                User Key  (r) = Recorded By, (t) = Taken By, (c) = Cosigned By      " "Initials Name Provider Type    Meena Mendosa, OTR Occupational Therapist                     Lymphedema       Row Name 03/26/25 1100             Subjective Pain    Able to rate subjective pain? yes  -JJ      Pre-Treatment Pain Level 6  -JJ      Post-Treatment Pain Level 6  -JJ         Subjective    Subjective Comments pt co B LE lymphedema symptoms and increased difficulty managing compression garments as well as increased edema in L knee  -JJ         Lymphedema Assessment    Lymphedema Classification RLE:;LLE:;secondary;stage 1 (Spontaneously Reversible)  -JJ      Infections or Cellulitis? yes  -JJ      Infection/Cellulitis Treatment pt states recent infections/wounds in R LE required treatment from wound care clinic x 4 months. no current wounds or infections  -JJ      Lymphedema Precautions CHF  -JJ         LLIS - Physical Concerns    The amount of pain associated with my lymphedema is: 1  -JJ      The amount of limb heaviness associated with my lymphedema is: 3  -JJ      The amount of skin tightness associated with my lymphedema is: 2  -JJ      The size of my swollen limb(s) seems: 3  -JJ      Lymphedema affects the movement of my swollen limb(s): 3  -JJ      The strength in my swollen limb(s) is: 2  -JJ         LLIS - Psychosocial Concerns    Lymphedema affects my body image (i.e., \"how I think I look\"). 2  -JJ      Lymphedema affects my socializing with others. 2  -JJ      Lymphedema affects my intimate relations with spouse or partner (rate 0 if not applicable 0  -JJ      Lymphedema \"gets me down\" (i.e., depression, frustration, or anger) 2  -JJ      I must rely on others for help due to my lymphedema. 1  -JJ      I know what to do to manage my lymphedema 1  -JJ         LLIS - Functional Concerns    Lymphedema affects my ability to perform self-care activities (i.e. eating, dressing, hygiene) 1  -JJ      Lymphedema affects my ability to perform routine home or work-related activities. 2  -JJ      " Lymphedema affects my performance of preferred leisure activities. 2  -JJ      Lymphedema affects proper fit of clothing/shoes 3  -JJ      Lymphedema affects my sleep 2  -JJ         Posture/Observations    Posture- WNL Posture is WNL  -JJ         General ROM    GENERAL ROM COMMENTS B LE AROM WFL  -JJ         Lymphedema Edema Assessment    Ptting Edema Category By severity  -JJ      Pitting Edema Moderate  -JJ         Skin Changes/Observations    Location/Assessment Lower Extremity  -JJ      Lower Extremity Conditions bilateral:;clean;shiny  -JJ      Lower Extremity Color/Pigment bilateral:;purple  -JJ         Lymphedema Measurements    Measurement Type(s) Circumferential  -JJ      Circumferential Areas Lower extremities  -JJ         BLE Circumferential (cm)    Measurement Location 1 5 cm above the knee  -JJ      Left 1 47.5 cm  -JJ      Right 1 43 cm  -JJ      Measurement Location 2 5 cm below the knee  -JJ      Left 2 43 cm  -JJ      Right 2 39.5 cm  -JJ      Measurement Location 3 widest part of the calf  -JJ      Left 3 38.5 cm  -JJ      Right 3 37 cm  -JJ      Measurement Location 4 5 cm above the ankle  -JJ      Left 4 28 cm  -JJ      Right 4 28.5 cm  -JJ      Measurement Location 5 ankle  -JJ      Left 5 28.5 cm  -JJ      Right 5 25.5 cm  -JJ      LLE Circumferential Total 185.5 cm  -JJ      RLE Circumferential Total 173.5 cm  -JJ         Manual Lymphatic Drainage    Manual Therapy pt states she has been using lyphedema pumps which has improved symptoms  -JJ         Compression/Skin Care    Compression/Skin Care Comments pt currently wearing B knee high pull on compression garments. pt states edema has started to increase above her garments and it is becoming increasingly ddifficult to manage pull on garments.  -JJ         Lymphedema Life Impact Scale Totals    A.  Total Q1 - Q17 (Do not include Q18) 32  -JJ      B.  Total number of questions answered (Q1-Q17) 17  -JJ      C. Divide A by B 1.88  -JJ      D.  "Multiple C by 25 47  -                User Key  (r) = Recorded By, (t) = Taken By, (c) = Cosigned By      Initials Name Provider Type    Meena Mendosa OTR Occupational Therapist                            Therapy Education  Given: HEP, Symptoms/condition management, Edema management  Program: New  How Provided: Verbal  Provided to: Patient  Level of Understanding: Verbalized         OT Goals       Row Name 03/26/25 1100          OT Short Term Goals    STG Date to Achieve 05/21/25  -     STG 1 pt will decrease B LE circumferential measurements by >/= to 10 cm each leg to decrease pain and risk of infections/wounds  -     STG 1 Progress New  -     STG 2 pt will verbalize independence and compliance with \"dos and donts of lymphedema\" including monitoring for symptoms and skin care  -     STG 2 Progress [facility]        Long Term Goals    LTG Date to Achieve 07/16/25  -     LTG 1 pt will demonstrate independence and compliance with B LE compression garments including wearing schedule and care of garments  -     LTG 1 Progress [facility]     LTG 2 pt will verbalize independence and compliance with daily use of B LE lymphedema pumps to decrease symptoms and assist with transition to self care of condition  -     LTG 2 Progress [facility]        Time Calculation    OT Goal Re-Cert Due Date 07/16/25  -               User Key  (r) = Recorded By, (t) = Taken By, (c) = Cosigned By      Initials Name Provider Type    Meena Mendosa OTR Occupational Therapist                     OT Assessment/Plan       Row Name 03/26/25 1100          OT Assessment    Functional Limitations Impaired locomotion;Performance in self-care ADL;Performance in leisure activities  -J     Impairments Edema;Impaired lymphatic circulation;Pain;Joint mobility  -JJ     Assessment Comments pt presents to clinic with  B LE lymphedema, left > right. pt has been treated in lymphedema clinic previously and consistently wears " B knee high pull garments. pt states recent R knee replacment and R LE infection and wounds. pt states edema is increasing above garments and it is becoming increasingly difficult for pt to manage pull on garments. pt states she has lymphedema pumps and has been wearing every other day or so. pt measured for velcro adjustable strap full leg garments to decrease upper leg edema and assist pt with self management of condition. Lower leg size large long and upper leg size small short bilaterally.  -J     Please refer to paper survey for additional self-reported information Yes  -JJ     OT Diagnosis B LE lymphedema  -J     OT Rehab Potential Excellent  -J     Patient/caregiver participated in establishment of treatment plan and goals Yes  -     Patient would benefit from skilled therapy intervention Yes  -        OT Plan    OT Frequency --  x 2-3 follow up appointments  -TONYA     Predicted Duration of Therapy Intervention (OT) x 16 weeks  -KENA     Planned CPT's? OT EVAL LOW COMPLEXITY: 35464;OT THER ACT EA 15 MIN: 35190HC;OT SELF CARE/MGMT/TRAIN 15 MIN: 31774;OT MANUAL THERAPY EA 15 MIN: 33589  -     OT Plan Comments pt will call for follow up appointment when garments arrive at home  -               User Key  (r) = Recorded By, (t) = Taken By, (c) = Cosigned By      Initials Name Provider Type    Meena Mendosa, SHWETAR Occupational Therapist                    Outcome Measure Options: Lower Extremity Functional Scale (LEFS)  Lower Extremity Functional Index  Any of your usual work, housework or school activities: A little bit of difficulty  Your usual hobbies, recreational or sporting activities: Moderate difficulty  Getting into or out of the bath: A little bit of difficulty  Walking between rooms: No difficulty  Putting on your shoes or socks: A little bit of difficulty  Squatting: Quite a bit of difficulty  Lifting an object, like a bag of groceries from the floor: No difficulty  Performing light  activities around your home: No difficulty  Performing heavy activities around your home: A little bit of difficulty  Getting into or out of a car: No difficulty  Walking 2 blocks: A little bit of difficulty  Walking a mile: Moderate difficulty  Going up or down 10 stairs (about 1 flight of stairs): Moderate difficulty  Standing for 1 hour: No difficulty  Sitting for 1 hour: No difficulty  Running on even ground: A little bit of difficulty  Running on uneven ground: Moderate difficulty  Making sharp turns while running fast: Moderate difficulty  Hopping: Moderate difficulty  Rolling over in bed: No difficulty  Total: 59      Time Calculation:   OT Start Time: 0830  OT Stop Time: 0930  OT Time Calculation (min): 60 min     Therapy Charges for Today       Code Description Service Date Service Provider Modifiers Qty    90719880894 HC OT EVAL LOW COMPLEXITY 3 3/26/2025 Meena Hay OTR GO 1                      JANI Amaya  3/27/2025   Coagulation/Other

## 2025-04-02 ENCOUNTER — APPOINTMENT (OUTPATIENT)
Dept: OTOLARYNGOLOGY | Facility: CLINIC | Age: 65
End: 2025-04-02
Payer: MEDICARE

## 2025-04-02 PROCEDURE — V5299A: CUSTOM

## 2025-04-09 ENCOUNTER — OUTPATIENT (OUTPATIENT)
Dept: OUTPATIENT SERVICES | Facility: HOSPITAL | Age: 65
LOS: 1 days | End: 2025-04-09

## 2025-04-09 ENCOUNTER — APPOINTMENT (OUTPATIENT)
Dept: SPEECH THERAPY | Facility: CLINIC | Age: 65
End: 2025-04-09

## 2025-04-09 DIAGNOSIS — Z95.828 PRESENCE OF OTHER VASCULAR IMPLANTS AND GRAFTS: Chronic | ICD-10-CM

## 2025-04-09 DIAGNOSIS — I77.0 ARTERIOVENOUS FISTULA, ACQUIRED: Chronic | ICD-10-CM

## 2025-04-09 DIAGNOSIS — Z95.1 PRESENCE OF AORTOCORONARY BYPASS GRAFT: Chronic | ICD-10-CM

## 2025-04-09 DIAGNOSIS — H90.3 SENSORINEURAL HEARING LOSS, BILATERAL: ICD-10-CM

## 2025-04-09 DIAGNOSIS — Z98.890 OTHER SPECIFIED POSTPROCEDURAL STATES: Chronic | ICD-10-CM

## 2025-04-26 ENCOUNTER — EMERGENCY (EMERGENCY)
Facility: HOSPITAL | Age: 65
LOS: 0 days | Discharge: HOME | End: 2025-04-26
Attending: STUDENT IN AN ORGANIZED HEALTH CARE EDUCATION/TRAINING PROGRAM
Payer: MEDICARE

## 2025-04-26 VITALS
TEMPERATURE: 98 F | OXYGEN SATURATION: 98 % | WEIGHT: 184.97 LBS | DIASTOLIC BLOOD PRESSURE: 70 MMHG | HEART RATE: 78 BPM | HEIGHT: 68 IN | SYSTOLIC BLOOD PRESSURE: 105 MMHG | RESPIRATION RATE: 18 BRPM

## 2025-04-26 DIAGNOSIS — Z99.2 DEPENDENCE ON RENAL DIALYSIS: ICD-10-CM

## 2025-04-26 DIAGNOSIS — I77.0 ARTERIOVENOUS FISTULA, ACQUIRED: Chronic | ICD-10-CM
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
## 2025-04-26 DIAGNOSIS — Z86.73 PERSONAL HISTORY OF TRANSIENT ISCHEMIC ATTACK (TIA), AND CEREBRAL INFARCTION WITHOUT RESIDUAL DEFICITS: ICD-10-CM

## 2025-04-26 DIAGNOSIS — E11.22 TYPE 2 DIABETES MELLITUS WITH DIABETIC CHRONIC KIDNEY DISEASE: ICD-10-CM

## 2025-04-26 DIAGNOSIS — S91.104A UNSPECIFIED OPEN WOUND OF RIGHT LESSER TOE(S) WITHOUT DAMAGE TO NAIL, INITIAL ENCOUNTER: ICD-10-CM

## 2025-04-26 DIAGNOSIS — Z95.828 PRESENCE OF OTHER VASCULAR IMPLANTS AND GRAFTS: Chronic | ICD-10-CM
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
## 2025-04-26 DIAGNOSIS — Z98.890 OTHER SPECIFIED POSTPROCEDURAL STATES: Chronic | ICD-10-CM

## 2025-04-26 DIAGNOSIS — N18.6 END STAGE RENAL DISEASE: ICD-10-CM

## 2025-04-26 DIAGNOSIS — Z95.1 PRESENCE OF AORTOCORONARY BYPASS GRAFT: ICD-10-CM

## 2025-04-26 DIAGNOSIS — Z89.422 ACQUIRED ABSENCE OF OTHER LEFT TOE(S): ICD-10-CM

## 2025-04-26 DIAGNOSIS — N40.0 BENIGN PROSTATIC HYPERPLASIA WITHOUT LOWER URINARY TRACT SYMPTOMS: ICD-10-CM

## 2025-04-26 DIAGNOSIS — E11.51 TYPE 2 DIABETES MELLITUS WITH DIABETIC PERIPHERAL ANGIOPATHY WITHOUT GANGRENE: ICD-10-CM

## 2025-04-26 DIAGNOSIS — Z95.1 PRESENCE OF AORTOCORONARY BYPASS GRAFT: Chronic | ICD-10-CM

## 2025-04-26 DIAGNOSIS — I12.0 HYPERTENSIVE CHRONIC KIDNEY DISEASE WITH STAGE 5 CHRONIC KIDNEY DISEASE OR END STAGE RENAL DISEASE: ICD-10-CM

## 2025-04-26 DIAGNOSIS — I25.10 ATHEROSCLEROTIC HEART DISEASE OF NATIVE CORONARY ARTERY WITHOUT ANGINA PECTORIS: ICD-10-CM

## 2025-04-26 PROCEDURE — 73620 X-RAY EXAM OF FOOT: CPT | Mod: RT

## 2025-04-26 PROCEDURE — 99283 EMERGENCY DEPT VISIT LOW MDM: CPT | Mod: 25

## 2025-04-26 PROCEDURE — 73620 X-RAY EXAM OF FOOT: CPT | Mod: 26,RT

## 2025-04-26 PROCEDURE — 90715 TDAP VACCINE 7 YRS/> IM: CPT

## 2025-04-26 PROCEDURE — 99284 EMERGENCY DEPT VISIT MOD MDM: CPT

## 2025-05-05 NOTE — DIETITIAN INITIAL EVALUATION ADULT - WEIGHT IN LBS
Caller: Taya Leonard    Relationship: Self    Best call back number: [phone]    Caller requesting test results: PATIENT     What test was performed: LAB WORK     When was the test performed: 04/30/2025    Where was the test performed: [facility]     Additional notes: PATIENT HAS SEEN THE RESULTS AND HAS A FEW QUESTIONS          200.1

## 2025-05-06 ENCOUNTER — OUTPATIENT (OUTPATIENT)
Dept: OUTPATIENT SERVICES | Facility: HOSPITAL | Age: 65
LOS: 1 days | End: 2025-05-06

## 2025-05-06 ENCOUNTER — OUTPATIENT (OUTPATIENT)
Dept: OUTPATIENT SERVICES | Facility: HOSPITAL | Age: 65
LOS: 1 days | End: 2025-05-06
Payer: MEDICARE

## 2025-05-06 ENCOUNTER — APPOINTMENT (OUTPATIENT)
Dept: SPEECH THERAPY | Facility: CLINIC | Age: 65
End: 2025-05-06

## 2025-05-06 DIAGNOSIS — I77.0 ARTERIOVENOUS FISTULA, ACQUIRED: Chronic | ICD-10-CM

## 2025-05-06 DIAGNOSIS — Z95.828 PRESENCE OF OTHER VASCULAR IMPLANTS AND GRAFTS: Chronic | ICD-10-CM

## 2025-05-06 DIAGNOSIS — Z98.890 OTHER SPECIFIED POSTPROCEDURAL STATES: Chronic | ICD-10-CM

## 2025-05-06 DIAGNOSIS — Z95.1 PRESENCE OF AORTOCORONARY BYPASS GRAFT: Chronic | ICD-10-CM

## 2025-05-06 DIAGNOSIS — H90.3 SENSORINEURAL HEARING LOSS, BILATERAL: ICD-10-CM

## 2025-05-06 PROCEDURE — V5241: CPT

## 2025-05-14 ENCOUNTER — EMERGENCY (EMERGENCY)
Facility: HOSPITAL | Age: 65
LOS: 0 days | Discharge: HOME | End: 2025-05-14
Attending: EMERGENCY MEDICINE
Payer: MEDICARE

## 2025-05-14 VITALS
HEIGHT: 68 IN | RESPIRATION RATE: 17 BRPM | SYSTOLIC BLOOD PRESSURE: 152 MMHG | DIASTOLIC BLOOD PRESSURE: 85 MMHG | HEART RATE: 107 BPM | TEMPERATURE: 98 F | OXYGEN SATURATION: 97 %

## 2025-05-14 DIAGNOSIS — L03.116 CELLULITIS OF LEFT LOWER LIMB: ICD-10-CM

## 2025-05-14 DIAGNOSIS — Z95.828 PRESENCE OF OTHER VASCULAR IMPLANTS AND GRAFTS: Chronic | ICD-10-CM

## 2025-05-14 DIAGNOSIS — Z99.2 DEPENDENCE ON RENAL DIALYSIS: ICD-10-CM

## 2025-05-14 DIAGNOSIS — N18.6 END STAGE RENAL DISEASE: ICD-10-CM

## 2025-05-14 DIAGNOSIS — E11.22 TYPE 2 DIABETES MELLITUS WITH DIABETIC CHRONIC KIDNEY DISEASE: ICD-10-CM

## 2025-05-14 DIAGNOSIS — E11.51 TYPE 2 DIABETES MELLITUS WITH DIABETIC PERIPHERAL ANGIOPATHY WITHOUT GANGRENE: ICD-10-CM

## 2025-05-14 DIAGNOSIS — I13.2 HYPERTENSIVE HEART AND CHRONIC KIDNEY DISEASE WITH HEART FAILURE AND WITH STAGE 5 CHRONIC KIDNEY DISEASE, OR END STAGE RENAL DISEASE: ICD-10-CM

## 2025-05-14 DIAGNOSIS — Z86.73 PERSONAL HISTORY OF TRANSIENT ISCHEMIC ATTACK (TIA), AND CEREBRAL INFARCTION WITHOUT RESIDUAL DEFICITS: ICD-10-CM

## 2025-05-14 DIAGNOSIS — Z87.891 PERSONAL HISTORY OF NICOTINE DEPENDENCE: ICD-10-CM

## 2025-05-14 DIAGNOSIS — Z95.1 PRESENCE OF AORTOCORONARY BYPASS GRAFT: Chronic | ICD-10-CM

## 2025-05-14 DIAGNOSIS — I77.0 ARTERIOVENOUS FISTULA, ACQUIRED: Chronic | ICD-10-CM

## 2025-05-14 DIAGNOSIS — Z95.1 PRESENCE OF AORTOCORONARY BYPASS GRAFT: ICD-10-CM

## 2025-05-14 DIAGNOSIS — Z98.890 OTHER SPECIFIED POSTPROCEDURAL STATES: Chronic | ICD-10-CM

## 2025-05-14 DIAGNOSIS — I50.9 HEART FAILURE, UNSPECIFIED: ICD-10-CM

## 2025-05-14 DIAGNOSIS — I25.10 ATHEROSCLEROTIC HEART DISEASE OF NATIVE CORONARY ARTERY WITHOUT ANGINA PECTORIS: ICD-10-CM

## 2025-05-14 LAB
ALBUMIN SERPL ELPH-MCNC: 3.5 G/DL — SIGNIFICANT CHANGE UP (ref 3.5–5.2)
ALP SERPL-CCNC: 151 U/L — HIGH (ref 30–115)
ALT FLD-CCNC: 17 U/L — SIGNIFICANT CHANGE UP (ref 0–41)
ANION GAP SERPL CALC-SCNC: 11 MMOL/L — SIGNIFICANT CHANGE UP (ref 7–14)
ANISOCYTOSIS BLD QL: SLIGHT — SIGNIFICANT CHANGE UP
AST SERPL-CCNC: 32 U/L — SIGNIFICANT CHANGE UP (ref 0–41)
BASE EXCESS BLDV CALC-SCNC: 12.9 MMOL/L — HIGH (ref -2–3)
BASOPHILS # BLD AUTO: 0.05 K/UL — SIGNIFICANT CHANGE UP (ref 0–0.2)
BASOPHILS NFR BLD AUTO: 0.9 % — SIGNIFICANT CHANGE UP (ref 0–1)
BILIRUB SERPL-MCNC: 0.3 MG/DL — SIGNIFICANT CHANGE UP (ref 0.2–1.2)
BUN SERPL-MCNC: 39 MG/DL — HIGH (ref 10–20)
CA-I SERPL-SCNC: 1.1 MMOL/L — LOW (ref 1.15–1.33)
CALCIUM SERPL-MCNC: 8.6 MG/DL — SIGNIFICANT CHANGE UP (ref 8.4–10.5)
CHLORIDE SERPL-SCNC: 92 MMOL/L — LOW (ref 98–110)
CO2 SERPL-SCNC: 30 MMOL/L — SIGNIFICANT CHANGE UP (ref 17–32)
CREAT SERPL-MCNC: 3.7 MG/DL — HIGH (ref 0.7–1.5)
EGFR: 17 ML/MIN/1.73M2 — LOW
EGFR: 17 ML/MIN/1.73M2 — LOW
EOSINOPHIL # BLD AUTO: 0.36 K/UL — SIGNIFICANT CHANGE UP (ref 0–0.7)
EOSINOPHIL NFR BLD AUTO: 6 % — SIGNIFICANT CHANGE UP (ref 0–8)
GAS PNL BLDV: 132 MMOL/L — LOW (ref 136–145)
GAS PNL BLDV: SIGNIFICANT CHANGE UP
GAS PNL BLDV: SIGNIFICANT CHANGE UP
GIANT PLATELETS BLD QL SMEAR: PRESENT — SIGNIFICANT CHANGE UP
GLUCOSE SERPL-MCNC: 169 MG/DL — HIGH (ref 70–99)
HCO3 BLDV-SCNC: 38 MMOL/L — HIGH (ref 22–29)
HCT VFR BLD CALC: 30.8 % — LOW (ref 42–52)
HCT VFR BLDA CALC: 30 % — LOW (ref 39–51)
HGB BLD CALC-MCNC: 9.9 G/DL — LOW (ref 12.6–17.4)
HGB BLD-MCNC: 10.1 G/DL — LOW (ref 14–18)
LACTATE BLDV-MCNC: 1.1 MMOL/L — SIGNIFICANT CHANGE UP (ref 0.5–2)
LACTATE SERPL-SCNC: 1.3 MMOL/L — SIGNIFICANT CHANGE UP (ref 0.7–2)
LYMPHOCYTES # BLD AUTO: 0.41 K/UL — LOW (ref 1.2–3.4)
LYMPHOCYTES # BLD AUTO: 6.9 % — LOW (ref 20.5–51.1)
MACROCYTES BLD QL: SLIGHT — SIGNIFICANT CHANGE UP
MANUAL SMEAR VERIFICATION: SIGNIFICANT CHANGE UP
MCHC RBC-ENTMCNC: 32.8 G/DL — SIGNIFICANT CHANGE UP (ref 32–37)
MCHC RBC-ENTMCNC: 34.2 PG — HIGH (ref 27–31)
MCV RBC AUTO: 104.4 FL — HIGH (ref 80–94)
MONOCYTES # BLD AUTO: 0.2 K/UL — SIGNIFICANT CHANGE UP (ref 0.1–0.6)
MONOCYTES NFR BLD AUTO: 3.4 % — SIGNIFICANT CHANGE UP (ref 1.7–9.3)
NEUTROPHILS # BLD AUTO: 4.94 K/UL — SIGNIFICANT CHANGE UP (ref 1.4–6.5)
NEUTROPHILS NFR BLD AUTO: 82.8 % — HIGH (ref 42.2–75.2)
PCO2 BLDV: 51 MMHG — SIGNIFICANT CHANGE UP (ref 42–55)
PH BLDV: 7.48 — HIGH (ref 7.32–7.43)
PLAT MORPH BLD: NORMAL — SIGNIFICANT CHANGE UP
PLATELET # BLD AUTO: 192 K/UL — SIGNIFICANT CHANGE UP (ref 130–400)
PMV BLD: 11.2 FL — HIGH (ref 7.4–10.4)
PO2 BLDV: 24 MMHG — LOW (ref 25–45)
POLYCHROMASIA BLD QL SMEAR: SLIGHT — SIGNIFICANT CHANGE UP
POTASSIUM BLDV-SCNC: 4.9 MMOL/L — SIGNIFICANT CHANGE UP (ref 3.5–5.1)
POTASSIUM SERPL-MCNC: 5.5 MMOL/L — HIGH (ref 3.5–5)
POTASSIUM SERPL-SCNC: 5.5 MMOL/L — HIGH (ref 3.5–5)
PROT SERPL-MCNC: 7 G/DL — SIGNIFICANT CHANGE UP (ref 6–8)
RBC # BLD: 2.95 M/UL — LOW (ref 4.7–6.1)
RBC # FLD: 13.2 % — SIGNIFICANT CHANGE UP (ref 11.5–14.5)
RBC BLD AUTO: ABNORMAL
SAO2 % BLDV: 37.9 % — LOW (ref 67–88)
SODIUM SERPL-SCNC: 133 MMOL/L — LOW (ref 135–146)
WBC # BLD: 5.97 K/UL — SIGNIFICANT CHANGE UP (ref 4.8–10.8)
WBC # FLD AUTO: 5.97 K/UL — SIGNIFICANT CHANGE UP (ref 4.8–10.8)

## 2025-05-14 PROCEDURE — 85014 HEMATOCRIT: CPT

## 2025-05-14 PROCEDURE — 99284 EMERGENCY DEPT VISIT MOD MDM: CPT | Mod: 25

## 2025-05-14 PROCEDURE — 83605 ASSAY OF LACTIC ACID: CPT | Mod: 91

## 2025-05-14 PROCEDURE — 93970 EXTREMITY STUDY: CPT | Mod: 26

## 2025-05-14 PROCEDURE — 87040 BLOOD CULTURE FOR BACTERIA: CPT

## 2025-05-14 PROCEDURE — 85025 COMPLETE CBC W/AUTO DIFF WBC: CPT

## 2025-05-14 PROCEDURE — 80053 COMPREHEN METABOLIC PANEL: CPT

## 2025-05-14 PROCEDURE — 82803 BLOOD GASES ANY COMBINATION: CPT

## 2025-05-14 PROCEDURE — 93970 EXTREMITY STUDY: CPT

## 2025-05-14 PROCEDURE — 73610 X-RAY EXAM OF ANKLE: CPT | Mod: LT

## 2025-05-14 PROCEDURE — 85018 HEMOGLOBIN: CPT

## 2025-05-14 PROCEDURE — 73630 X-RAY EXAM OF FOOT: CPT | Mod: 26,LT

## 2025-05-14 PROCEDURE — 82330 ASSAY OF CALCIUM: CPT

## 2025-05-14 PROCEDURE — 84132 ASSAY OF SERUM POTASSIUM: CPT

## 2025-05-14 PROCEDURE — 73590 X-RAY EXAM OF LOWER LEG: CPT | Mod: LT

## 2025-05-14 PROCEDURE — 73610 X-RAY EXAM OF ANKLE: CPT | Mod: 26,LT

## 2025-05-14 PROCEDURE — 36415 COLL VENOUS BLD VENIPUNCTURE: CPT

## 2025-05-14 PROCEDURE — 36000 PLACE NEEDLE IN VEIN: CPT

## 2025-05-14 PROCEDURE — 84295 ASSAY OF SERUM SODIUM: CPT

## 2025-05-14 PROCEDURE — 73630 X-RAY EXAM OF FOOT: CPT | Mod: LT

## 2025-05-14 PROCEDURE — 73590 X-RAY EXAM OF LOWER LEG: CPT | Mod: 26,LT

## 2025-05-14 PROCEDURE — 99285 EMERGENCY DEPT VISIT HI MDM: CPT | Mod: FS

## 2025-05-14 RX ORDER — DOXYCYCLINE HYCLATE 100 MG
1 TABLET ORAL
Qty: 14 | Refills: 0
Start: 2025-05-14 | End: 2025-05-20

## 2025-05-14 NOTE — ED PROVIDER NOTE - EKG/XRAY ADDITIONAL INFORMATION
ED xray film prelim, my independent interpretation - Dr. Bowens: [No fracture or dislocation, no foreign body]

## 2025-05-14 NOTE — ED PROVIDER NOTE - PATIENT'S GENDER IDENTITY
Problem: Respiratory Impairment - Respiratory Therapy 253  Intervention: Respiratory support devices  Note: Intervention Status  Not Done    Pt refuses her home Cpap tonight       
Male
gastrointestinal

## 2025-05-14 NOTE — ED PROVIDER NOTE - PATIENT PORTAL LINK FT
You can access the FollowMyHealth Patient Portal offered by [facility] by registering at the following website: [email or web address]. By joining [employer]’s FollowMyHealth portal, you will also be able to view your health information using other applications (apps) compatible with our system.

## 2025-05-14 NOTE — ED PROVIDER NOTE - ATTENDING APP SHARED VISIT CONTRIBUTION OF CARE
65-year-old male past med history of end-stage renal disease dialyzed today, anemia, diabetes, CAD, hypertension, CHF, TIA, peripheral artery disease status post toe amputation presents with left foot redness and swelling for the last 2 days.  Patient has chronic redness to bilateral lower legs reports that the swelling to the foot and redness were new.  Denies any fevers or chills.  Patient resides at [facility] was concerned and sent him in for evaluation.  Did not yet start medications for this.    CONSTITUTIONAL: Well-developed; well-nourished; in no acute distress.   SKIN: warm, dry. + errythema to bl lower extremities over shin. + edema to left foot with dorsal errythema.   HEAD: Normocephalic; atraumatic.  EYES: PERRL, EOMI, no conjunctival erythema  ENT: No nasal discharge; airway clear.  EXT: Normal ROM.  5/5 strength in all 4 extremities. + edema to bl LE L>R. pulses intact.   LYMPH: No acute cervical adenopathy.  NEURO: Alert, oriented, grossly unremarkable. neurovascularly intact  PSYCH: Cooperative, appropriate.

## 2025-05-14 NOTE — ED PROVIDER NOTE - NSFOLLOWUPINSTRUCTIONS_ED_ALL_ED_FT
Please follow up with your primary care physician.     Cellulitis    Cellulitis is a skin infection caused by bacteria. This condition occurs most often in the arms and lower legs but can occur anywhere over the body. Symptoms include redness, swelling, warm skin, tenderness, and chills/fever. If you were prescribed an antibiotic medicine, take it as told by your health care provider. Do not stop taking the antibiotic even if you start to feel better.    SEEK IMMEDIATE MEDICAL CARE IF YOU HAVE ANY OF THE FOLLOWING SYMPTOMS: worsening fever, red streaks coming from affected area, vomiting or diarrhea, or dizziness/lightheadedness.

## 2025-05-14 NOTE — ED ADULT NURSE NOTE - NS ED NURSE LEVEL OF CONSCIOUSNESS SPEECH
Speaking Coherently decreased strength/impaired balance/decreased flexibility/narrow base of support

## 2025-05-14 NOTE — ED PROVIDER NOTE - PHYSICAL EXAMINATION
CONSTITUTIONAL: Well-appearing; well-nourished; in no apparent distress.   EYES: PERRL; EOM intact.   ENT: normal nose; no rhinorrhea; normal pharynx with no tonsillar hypertrophy.   NECK: Supple; non-tender; no cervical lymphadenopathy.   CARDIOVASCULAR: Normal S1, S2; no murmurs, rubs, or gallops. Equal radial pulses  RESPIRATORY: Normal chest excursion with respiration; breath sounds clear and equal bilaterally; no wheezes, rhonchi, or rales.  GI/: Normal bowel sounds; non-distended; non-tender; no palpable organomegaly.   MS: No evidence of trauma or deformity. Normal ROM in all four extremities; non-tender to palpation; distal pulses are normal.   Extrem: + b/l LE edema L> Rt with chronic skin changes. + mild warmth to LLE. Pedal pulses intact  SKIN: Normal for age and race; warm; dry; good turgor; no apparent lesions or exudate.   NEURO/PSYCH: A & O x 4; grossly unremarkable. neurovascular intact. Sensation intact

## 2025-05-14 NOTE — ED PROVIDER NOTE - CLINICAL SUMMARY MEDICAL DECISION MAKING FREE TEXT BOX
Patient presents with swelling and redness to the left foot.  No fevers or chills.  Labs x-ray and duplex done.  No acute findings.  Duplex negative for DVT.  Started on oral doxycycline.  Discharged back to [facility].  Understands need to follow-up with primary care physician.  Return precautions discussed.

## 2025-05-14 NOTE — ED PROVIDER NOTE - OBJECTIVE STATEMENT
65-year-old male history of end-stage renal disease on dialysis Monday/Wednesday/Friday last dialysis session today/left upper extremity obvious shortening, diabetes, PAD, CAD status post CABG and only wanting to be CHF, TIA, PAD, left toe ulcer status post amputation presenting to ER with 2 to 3 days of left lower extremity swelling.  He denies any recent trauma to the left lower extremity, fever, chills, inability to bear weight or ambulate, chest pain or shortness of breath, nausea, vomiting, paresthesia/numbness to leg

## 2025-05-15 RX ORDER — DOXYCYCLINE HYCLATE 100 MG
1 TABLET ORAL
Qty: 14 | Refills: 0
Start: 2025-05-15 | End: 2025-05-21

## 2025-05-15 NOTE — ED POST DISCHARGE NOTE - RESULT SUMMARY
NADIR HUTCHINSON CALLED FROM [facility]: RX FOR DOXY MUST BE RE-SENT TO [facility]. RX RE-SENT

## 2025-05-19 LAB
CULTURE RESULTS: SIGNIFICANT CHANGE UP
CULTURE RESULTS: SIGNIFICANT CHANGE UP
SPECIMEN SOURCE: SIGNIFICANT CHANGE UP
SPECIMEN SOURCE: SIGNIFICANT CHANGE UP

## 2025-05-20 ENCOUNTER — OUTPATIENT (OUTPATIENT)
Dept: OUTPATIENT SERVICES | Facility: HOSPITAL | Age: 65
LOS: 1 days | End: 2025-05-20
Payer: MEDICARE

## 2025-05-20 ENCOUNTER — APPOINTMENT (OUTPATIENT)
Dept: SPEECH THERAPY | Facility: CLINIC | Age: 65
End: 2025-05-20

## 2025-05-20 DIAGNOSIS — H90.3 SENSORINEURAL HEARING LOSS, BILATERAL: ICD-10-CM

## 2025-05-20 DIAGNOSIS — Z95.828 PRESENCE OF OTHER VASCULAR IMPLANTS AND GRAFTS: Chronic | ICD-10-CM

## 2025-05-20 DIAGNOSIS — I77.0 ARTERIOVENOUS FISTULA, ACQUIRED: Chronic | ICD-10-CM

## 2025-05-20 PROCEDURE — 92592: CPT

## 2025-05-27 ENCOUNTER — EMERGENCY (EMERGENCY)
Facility: HOSPITAL | Age: 65
LOS: 0 days | Discharge: HOME | End: 2025-05-27
Attending: EMERGENCY MEDICINE
Payer: MEDICARE

## 2025-05-27 VITALS
DIASTOLIC BLOOD PRESSURE: 65 MMHG | RESPIRATION RATE: 16 BRPM | HEIGHT: 68 IN | OXYGEN SATURATION: 100 % | SYSTOLIC BLOOD PRESSURE: 121 MMHG | TEMPERATURE: 97 F | HEART RATE: 71 BPM

## 2025-05-27 VITALS — OXYGEN SATURATION: 95 % | RESPIRATION RATE: 20 BRPM

## 2025-05-27 DIAGNOSIS — L03.116 CELLULITIS OF LEFT LOWER LIMB: ICD-10-CM

## 2025-05-27 DIAGNOSIS — Z95.828 PRESENCE OF OTHER VASCULAR IMPLANTS AND GRAFTS: Chronic | ICD-10-CM

## 2025-05-27 DIAGNOSIS — Z95.1 PRESENCE OF AORTOCORONARY BYPASS GRAFT: Chronic | ICD-10-CM

## 2025-05-27 DIAGNOSIS — I77.0 ARTERIOVENOUS FISTULA, ACQUIRED: Chronic | ICD-10-CM

## 2025-05-27 DIAGNOSIS — Z98.890 OTHER SPECIFIED POSTPROCEDURAL STATES: Chronic | ICD-10-CM

## 2025-05-27 DIAGNOSIS — M79.89 OTHER SPECIFIED SOFT TISSUE DISORDERS: ICD-10-CM

## 2025-05-27 LAB
ALBUMIN SERPL ELPH-MCNC: 3.9 G/DL — SIGNIFICANT CHANGE UP (ref 3.5–5.2)
ALP SERPL-CCNC: 212 U/L — HIGH (ref 30–115)
ALT FLD-CCNC: 21 U/L — SIGNIFICANT CHANGE UP (ref 0–41)
ANION GAP SERPL CALC-SCNC: 15 MMOL/L — HIGH (ref 7–14)
AST SERPL-CCNC: 27 U/L — SIGNIFICANT CHANGE UP (ref 0–41)
BASOPHILS # BLD AUTO: 0.1 K/UL — SIGNIFICANT CHANGE UP (ref 0–0.2)
BASOPHILS NFR BLD AUTO: 1.5 % — HIGH (ref 0–1)
BILIRUB SERPL-MCNC: 0.3 MG/DL — SIGNIFICANT CHANGE UP (ref 0.2–1.2)
BUN SERPL-MCNC: 61 MG/DL — CRITICAL HIGH (ref 10–20)
CALCIUM SERPL-MCNC: 8.9 MG/DL — SIGNIFICANT CHANGE UP (ref 8.4–10.5)
CHLORIDE SERPL-SCNC: 92 MMOL/L — LOW (ref 98–110)
CO2 SERPL-SCNC: 28 MMOL/L — SIGNIFICANT CHANGE UP (ref 17–32)
CREAT SERPL-MCNC: 5.1 MG/DL — CRITICAL HIGH (ref 0.7–1.5)
EGFR: 12 ML/MIN/1.73M2 — LOW
EGFR: 12 ML/MIN/1.73M2 — LOW
EOSINOPHIL # BLD AUTO: 0.21 K/UL — SIGNIFICANT CHANGE UP (ref 0–0.7)
EOSINOPHIL NFR BLD AUTO: 3.2 % — SIGNIFICANT CHANGE UP (ref 0–8)
GLUCOSE SERPL-MCNC: 160 MG/DL — HIGH (ref 70–99)
HCT VFR BLD CALC: 29 % — LOW (ref 42–52)
HGB BLD-MCNC: 9.5 G/DL — LOW (ref 14–18)
IMM GRANULOCYTES NFR BLD AUTO: 0.3 % — SIGNIFICANT CHANGE UP (ref 0.1–0.3)
LACTATE SERPL-SCNC: 2.1 MMOL/L — HIGH (ref 0.7–2)
LYMPHOCYTES # BLD AUTO: 0.6 K/UL — LOW (ref 1.2–3.4)
LYMPHOCYTES # BLD AUTO: 9.2 % — LOW (ref 20.5–51.1)
MCHC RBC-ENTMCNC: 32.8 G/DL — SIGNIFICANT CHANGE UP (ref 32–37)
MCHC RBC-ENTMCNC: 34.1 PG — HIGH (ref 27–31)
MCV RBC AUTO: 103.9 FL — HIGH (ref 80–94)
MONOCYTES # BLD AUTO: 0.64 K/UL — HIGH (ref 0.1–0.6)
MONOCYTES NFR BLD AUTO: 9.8 % — HIGH (ref 1.7–9.3)
NEUTROPHILS # BLD AUTO: 4.95 K/UL — SIGNIFICANT CHANGE UP (ref 1.4–6.5)
NEUTROPHILS NFR BLD AUTO: 76 % — HIGH (ref 42.2–75.2)
NRBC BLD AUTO-RTO: 0 /100 WBCS — SIGNIFICANT CHANGE UP (ref 0–0)
PLATELET # BLD AUTO: 183 K/UL — SIGNIFICANT CHANGE UP (ref 130–400)
PMV BLD: 11.4 FL — HIGH (ref 7.4–10.4)
POTASSIUM SERPL-MCNC: 5.2 MMOL/L — HIGH (ref 3.5–5)
POTASSIUM SERPL-SCNC: 5.2 MMOL/L — HIGH (ref 3.5–5)
PROT SERPL-MCNC: 7 G/DL — SIGNIFICANT CHANGE UP (ref 6–8)
RBC # BLD: 2.79 M/UL — LOW (ref 4.7–6.1)
RBC # FLD: 13.3 % — SIGNIFICANT CHANGE UP (ref 11.5–14.5)
SODIUM SERPL-SCNC: 135 MMOL/L — SIGNIFICANT CHANGE UP (ref 135–146)
WBC # BLD: 6.52 K/UL — SIGNIFICANT CHANGE UP (ref 4.8–10.8)
WBC # FLD AUTO: 6.52 K/UL — SIGNIFICANT CHANGE UP (ref 4.8–10.8)

## 2025-05-27 PROCEDURE — 83605 ASSAY OF LACTIC ACID: CPT

## 2025-05-27 PROCEDURE — 93971 EXTREMITY STUDY: CPT | Mod: 26,LT

## 2025-05-27 PROCEDURE — 99284 EMERGENCY DEPT VISIT MOD MDM: CPT | Mod: 25

## 2025-05-27 PROCEDURE — 99283 EMERGENCY DEPT VISIT LOW MDM: CPT

## 2025-05-27 PROCEDURE — 73630 X-RAY EXAM OF FOOT: CPT | Mod: 26,LT

## 2025-05-27 PROCEDURE — 93971 EXTREMITY STUDY: CPT | Mod: LT

## 2025-05-27 PROCEDURE — 99285 EMERGENCY DEPT VISIT HI MDM: CPT | Mod: FS

## 2025-05-27 PROCEDURE — 80053 COMPREHEN METABOLIC PANEL: CPT

## 2025-05-27 PROCEDURE — 73630 X-RAY EXAM OF FOOT: CPT | Mod: LT

## 2025-05-27 PROCEDURE — 36000 PLACE NEEDLE IN VEIN: CPT

## 2025-05-27 PROCEDURE — 87040 BLOOD CULTURE FOR BACTERIA: CPT | Mod: 91

## 2025-05-27 PROCEDURE — 36415 COLL VENOUS BLD VENIPUNCTURE: CPT

## 2025-05-27 PROCEDURE — 85025 COMPLETE CBC W/AUTO DIFF WBC: CPT

## 2025-05-27 RX ORDER — CEPHALEXIN 250 MG/1
1 CAPSULE ORAL
Qty: 28 | Refills: 0
Start: 2025-05-27 | End: 2025-06-02

## 2025-05-27 RX ORDER — SULFAMETHOXAZOLE/TRIMETHOPRIM 800-160 MG
1 TABLET ORAL
Qty: 20 | Refills: 0
Start: 2025-05-27 | End: 2025-06-02

## 2025-05-27 RX ADMIN — Medication 500 MILLILITER(S): at 20:07

## 2025-05-27 NOTE — ED ADULT NURSE NOTE - NSICDXPASTMEDICALHX_GEN_ALL_CORE_FT
PAST MEDICAL HISTORY:  BPH (benign prostatic hyperplasia)     Chronic anemia     Chronic kidney disease (CKD) Stage IV    Diabetes mellitus     History of medical problems left arm precaution    HLD (hyperlipidemia)     [place] (hard of hearing)     Hypertension     Myocardial infarction 2012    OA (osteoarthritis)     PAD (peripheral artery disease) S/p bypass left leg    Pain in left knee s/p fall    S/P CABG (coronary artery bypass graft) x4    Stented coronary artery in 2008    Transient ischemic attack (TIA) 2017; 2008

## 2025-05-27 NOTE — CONSULT NOTE ADULT - SUBJECTIVE AND OBJECTIVE BOX
Podiatry Consult Note    Subjective:  SCHWABACHER, LAWRENCE  Seen Bedside 65y Male  .   Patient is a 65y old  Male who presents with a chief complaint of toe discoloration  HPI:      Past Medical History and Surgical History  PAST MEDICAL & SURGICAL HISTORY:  S/P CABG (coronary artery bypass graft)  x4      Diabetes mellitus      Transient ischemic attack (TIA)  2017; 2008      Chronic kidney disease (CKD)  Stage IV      Hypertension      Stented coronary artery  in 2008      Myocardial infarction  2012      PAD (peripheral artery disease)  S/p bypass left leg      HLD (hyperlipidemia)      BPH (benign prostatic hyperplasia)      Pain in left knee  s/p fall      OA (osteoarthritis)      [place] (hard of hearing)      Chronic anemia      History of medical problems  left arm precaution      S/P CABG (coronary artery bypass graft)  2012      H/O arterial bypass of lower limb  Left Lower Extremity (2016)      History of surgery  Left CEA (2017)  Left Pinkie toe Amputation (2014)  CABG x 4 (2012)  Card cath - stent (2008)        AV fistula  2019  LEFT AV FISTULA           Review of Systems:  [X] Ten point review of systems is otherwise negative except as noted     Objective:  Vital Signs Last 24 Hrs  T(C): 36.3 (27 May 2025 14:13), Max: 36.3 (27 May 2025 14:13)  T(F): 97.3 (27 May 2025 14:13), Max: 97.3 (27 May 2025 14:13)  HR: 71 (27 May 2025 14:13) (71 - 71)  BP: 121/65 (27 May 2025 14:13) (121/65 - 121/65)  BP(mean): --  RR: 20 (27 May 2025 16:05) (16 - 20)  SpO2: 95% (27 May 2025 16:05) (85% - 100%)    Parameters below as of 27 May 2025 16:05  Patient On (Oxygen Delivery Method): nasal cannula  O2 Flow (L/min): 4                          9.5    6.52  )-----------( 183      ( 27 May 2025 16:09 )             29.0                 05-27    135  |  92[L]  |  61[HH]  ----------------------------<  160[H]  5.2[H]   |  28  |  5.1[HH]    Ca    8.9      27 May 2025 16:09    TPro  7.0  /  Alb  3.9  /  TBili  0.3  /  DBili  x   /  AST  27  /  ALT  21  /  AlkPhos  212[H]  05-27    XR L foot 5/27: Status post left fifth transverse metatarsal amputation. Redemonstrated   severe Charcot arthropathy of the midfoot and forefoot with degenerative   changes and loss normal plantar arch. No definite osseous erosions.   Dorsal soft tissue swelling. Vascular calcifications.    Physical Exam Left Lower Extremity Focused:   Derm:   - Small Hemorrhagic blister to the hallux medial nail fold. Appears stable without any fluctuance.   - Small wound plantar to the heel with a scab. No drainage or bleeding.   Vascular: DP and PT Pulses Diminished; Foot is Warm to the touch; Capillary Refill Time delayed; 1+ pitting edema to the dorsum of the forefoot.   Neuro: Protective Sensation Diminished / Moderately Neuropathic   MSK: Pain On Palpation at Wound Site; Chronic charcot neuropathy; Amputated 5th toe      Assessment:  Hallux Blister    Plan:  Chart reviewed and Patient evaluated. All Questions and Concerns Addressed and Answered  XR Imaging Left Foot; reviewed Results   Weight Bearing Status; WBAT  Recommend; LLE venous duplex to r/o DVT  Patient is stable for discharge from podiatric standpoint if Duplex findings are not concerning for DVT.  Follow up outpatient with Dr. Garnett at podiatry clinic  Patient seen and evaluated with Dr. Garnett  Podiatry appreciates the consult!    Podiatry   Please message on teams for further questions

## 2025-05-27 NOTE — CONSULT NOTE ADULT - ATTENDING COMMENTS
patient presents to the ED with chief complaint of left lower extremity swelling.  Patient became concerned for infection and presented to the emergency department.  Physical exam: there is noted increased swelling to the left lower extremity compared to the right.  Light erythema in the gaiter distribution bilateral lower extremities.  Tenderness to palpation of the left calf.  Small blood blister at the distal tuft of the left first toe.  -Follow-up CBC  - Recommend venous duplex to rule out DVT  - X-rays to rule out acute Charcot, though low suspicion of arthropathy

## 2025-05-27 NOTE — ED PROVIDER NOTE - PROGRESS NOTE DETAILS
Casimiro: spoke with podiatry who sts that pt is cleared to go home if US is neg for DVT. pt can go home with abx and follow up

## 2025-05-27 NOTE — ED PROVIDER NOTE - NSFOLLOWUPINSTRUCTIONS_ED_ALL_ED_FT
Please follow up with podiatry in 1-3 days     ******* Our Emergency Department Referral Coordinators will be reaching out to you in the next 24-48 hours from 9:00am to 5:00pm with a follow up appointment. Please expect a phone call from the hospital in that time frame. If you do not receive a call or if you have any questions or concerns, you can reach them at [phone]     Cellulitis    WHAT YOU NEED TO KNOW:    Cellulitis is a skin infection caused by bacteria. Cellulitis may go away on its own or you may need treatment. Your healthcare provider may draw a [place] around the outside edges of your cellulitis. If your cellulitis spreads, your healthcare provider will see it outside of the [place]. Cellulitis          DISCHARGE INSTRUCTIONS:    Call 911 if:     You have sudden trouble breathing or chest pain.        Return to the emergency department if:     Your wound gets larger and more painful.       You feel a crackling under your skin when you touch it.      You have purple dots or bumps on your skin, or you see bleeding under your skin.      You have new swelling and pain in your legs.      The red, warm, swollen area gets larger.      You see red streaks coming from the infected area.    Contact your healthcare provider if:     You have a fever.      Your fever or pain does not go away or gets worse.      The area does not get smaller after 2 days of antibiotics.      Your skin is flaking or peeling off.      You have questions or concerns about your condition or care.    Medicines:     Antibiotics help treat the bacterial infection.       NSAIDs, such as ibuprofen, help decrease swelling, pain, and fever. NSAIDs can cause stomach bleeding or kidney problems in certain people. If you take blood thinner medicine, always ask if NSAIDs are safe for you. Always read the medicine label and follow directions. Do not give these medicines to children under 6 months of age without direction from your child's healthcare provider.      Acetaminophen decreases pain and fever. It is available without a doctor's order. Ask how much to take and how often to take it. Follow directions. Read the labels of all other medicines you are using to see if they also contain acetaminophen, or ask your doctor or pharmacist. Acetaminophen can cause liver damage if not taken correctly. Do not use more than 4 grams (4,000 milligrams) total of acetaminophen in one day.       Take your medicine as directed. Contact your healthcare provider if you think your medicine is not helping or if you have side effects. Tell him or her if you are allergic to any medicine. Keep a list of the medicines, vitamins, and herbs you take. Include the amounts, and when and why you take them. Bring the list or the pill bottles to follow-up visits. Carry your medicine list with you in case of an emergency.    Self-care:     Elevate the area above the level of your heart as often as you can. This will help decrease swelling and pain. Prop the area on pillows or blankets to keep it elevated comfortably.       Clean the area daily until the wound scabs over. Gently wash the area with soap and water. Pat dry. Use dressings as directed.       Place cool or warm, wet cloths on the area as directed. Use clean cloths and clean water. Leave it on the area until the cloth is room temperature. Pat the area dry with a clean, dry cloth. The cloths may help decrease pain.     Prevent cellulitis:     Do not scratch bug bites or areas of injury. You increase your risk for cellulitis by scratching these areas.       Do not share personal items, such as towels, clothing, and razors.       Clean exercise equipment with germ-killing  before and after you use it.      Wash your hands often. Use soap and water. Wash your hands after you use the bathroom, change a child's diapers, or sneeze. Wash your hands before you prepare or eat food. Use lotion to prevent dry, cracked skin. Handwashing           Wear pressure stockings as directed. You may be told to wear the stockings if you have peripheral edema. The stockings improve blood flow and decrease swelling.      Treat athlete’s foot. This can help prevent the spread of a bacterial skin infection.    Follow up with your healthcare provider within 3 days, or as directed: Your healthcare provider will check if your cellulitis is getting better. You may need different medicine. Write down your questions so you remember to ask them during your visits.       © Copyright [employer] 2019 All illustrations and images included in CareNotes are the copyrighted property of [employer] or [employer]

## 2025-05-27 NOTE — ED ADULT NURSE NOTE - NSFALLRISKINTERV_ED_ALL_ED

## 2025-05-27 NOTE — ED PROVIDER NOTE - CLINICAL SUMMARY MEDICAL DECISION MAKING FREE TEXT BOX
Patient is a 65-year-old male who was seen and examined with the PA.  The patient is from [facility].  The patient was recently placed on antibiotics for left foot cellulitis.  However, the pain and redness continues.    On our exam the left foot is erythematous and warm to touch.  Left great toe with some black eschar.    Admitted for cellulitis failing outpatient treatment

## 2025-05-27 NOTE — ED PROVIDER NOTE - CARE PROVIDER_API CALL
Marciano Garivn  Podiatric Medicine and Surgery  [address], [address]  [place] [address]  Phone: [phone]  Fax: [phone]  Follow Up Time:

## 2025-05-27 NOTE — ED PROVIDER NOTE - NSICDXPASTMEDICALHX_GEN_ALL_CORE_FT
PAST MEDICAL HISTORY:  BPH (benign prostatic hyperplasia)     Chronic anemia     Chronic kidney disease (CKD) Stage IV    Diabetes mellitus     History of medical problems left arm precaution    HLD (hyperlipidemia)     [place] (hard of hearing)     Hypertension     Myocardial infarction 2012    OA (osteoarthritis)     PAD (peripheral artery disease) S/p bypass left leg    Pain in left knee s/p fall    S/P CABG (coronary artery bypass graft) x4    Stented coronary artery in 2008    Transient ischemic attack (TIA) 2017; 2008

## 2025-05-27 NOTE — ED PROVIDER NOTE - OBJECTIVE STATEMENT
65 M 65-year-old male presenting from [facility] for evaluation of left lower leg swelling and redness.  Endorsing has been having pain to the left foot.  Was seen in the hospital for similar symptoms before and was being treated for cellulitis.  Patient endorsing that he noted a dark spot to his left great toe this morning which prompted visit to the ED from [facility]

## 2025-05-27 NOTE — ED PROVIDER NOTE - PATIENT PORTAL LINK FT
You can access the FollowMyHealth Patient Portal offered by [facility] by registering at the following website: [email or web address]. By joining [facility]’s FollowMyHealth portal, you will also be able to view your health information using other applications (apps) compatible with our system.

## 2025-05-27 NOTE — ED PROVIDER NOTE - PHYSICAL EXAMINATION
VITAL SIGNS: I have reviewed nursing notes and confirm.  CONSTITUTIONAL:  in no acute distress. pt is hard of hearing   SKIN: Skin exam is warm and dry, no acute rash.  HEAD: Normocephalic; atraumatic.  EYES:  EOM intact; conjunctiva and sclera clear.  ENT: No nasal discharge; airway clear.   CARD: S1, S2 normal; no murmurs, gallops, or rubs. Regular rate and rhythm.  RESP: No wheezes, rales or rhonchi. Speaking in full sentences.   ABD: Normal bowel sounds; soft; non-distended; non-tender; No rebound or guarding. No CVA tenderness.  EXT: club left foot with redness and erythema to the left foot with swelling.   No clubbing, cyanosis or edema.

## 2025-05-29 NOTE — CHART NOTE - NSCHARTNOTEFT_GEN_A_CORE
Left message with front staff for [place], CT 5/28. Patient already has established podiatrist, CT 5/29 (podiatry referral).

## 2025-06-01 ENCOUNTER — INPATIENT (INPATIENT)
Facility: HOSPITAL | Age: 65
LOS: 11 days | Discharge: HOME | DRG: 684 | End: 2025-06-13
Attending: HOSPITALIST | Admitting: INTERNAL MEDICINE
Payer: MEDICARE

## 2025-06-01 VITALS
OXYGEN SATURATION: 94 % | SYSTOLIC BLOOD PRESSURE: 124 MMHG | DIASTOLIC BLOOD PRESSURE: 72 MMHG | HEART RATE: 72 BPM | TEMPERATURE: 98 F | HEIGHT: 68 IN | RESPIRATION RATE: 18 BRPM

## 2025-06-01 DIAGNOSIS — I77.0 ARTERIOVENOUS FISTULA, ACQUIRED: Chronic | ICD-10-CM

## 2025-06-01 DIAGNOSIS — Z95.828 PRESENCE OF OTHER VASCULAR IMPLANTS AND GRAFTS: Chronic | ICD-10-CM

## 2025-06-01 DIAGNOSIS — Z95.1 PRESENCE OF AORTOCORONARY BYPASS GRAFT: Chronic | ICD-10-CM

## 2025-06-01 DIAGNOSIS — Z98.890 OTHER SPECIFIED POSTPROCEDURAL STATES: Chronic | ICD-10-CM

## 2025-06-01 LAB
ALBUMIN SERPL ELPH-MCNC: 3.6 G/DL — SIGNIFICANT CHANGE UP (ref 3.5–5.2)
ALP SERPL-CCNC: 183 U/L — HIGH (ref 30–115)
ALT FLD-CCNC: 17 U/L — SIGNIFICANT CHANGE UP (ref 0–41)
ANION GAP SERPL CALC-SCNC: 15 MMOL/L — HIGH (ref 7–14)
AST SERPL-CCNC: 25 U/L — SIGNIFICANT CHANGE UP (ref 0–41)
BASOPHILS # BLD AUTO: 0.08 K/UL — SIGNIFICANT CHANGE UP (ref 0–0.2)
BASOPHILS NFR BLD AUTO: 1 % — SIGNIFICANT CHANGE UP (ref 0–1)
BILIRUB SERPL-MCNC: 0.2 MG/DL — SIGNIFICANT CHANGE UP (ref 0.2–1.2)
BUN SERPL-MCNC: 67 MG/DL — CRITICAL HIGH (ref 10–20)
CALCIUM SERPL-MCNC: 8.7 MG/DL — SIGNIFICANT CHANGE UP (ref 8.4–10.5)
CHLORIDE SERPL-SCNC: 93 MMOL/L — LOW (ref 98–110)
CO2 SERPL-SCNC: 27 MMOL/L — SIGNIFICANT CHANGE UP (ref 17–32)
CREAT SERPL-MCNC: 5.2 MG/DL — CRITICAL HIGH (ref 0.7–1.5)
CULTURE RESULTS: SIGNIFICANT CHANGE UP
CULTURE RESULTS: SIGNIFICANT CHANGE UP
EGFR: 11 ML/MIN/1.73M2 — LOW
EGFR: 11 ML/MIN/1.73M2 — LOW
EOSINOPHIL # BLD AUTO: 0.2 K/UL — SIGNIFICANT CHANGE UP (ref 0–0.7)
EOSINOPHIL NFR BLD AUTO: 2.5 % — SIGNIFICANT CHANGE UP (ref 0–8)
GAS PNL BLDV: SIGNIFICANT CHANGE UP
GLUCOSE SERPL-MCNC: 157 MG/DL — HIGH (ref 70–99)
HCT VFR BLD CALC: 28.9 % — LOW (ref 42–52)
HGB BLD-MCNC: 9.4 G/DL — LOW (ref 14–18)
IMM GRANULOCYTES NFR BLD AUTO: 0.4 % — HIGH (ref 0.1–0.3)
LYMPHOCYTES # BLD AUTO: 0.61 K/UL — LOW (ref 1.2–3.4)
LYMPHOCYTES # BLD AUTO: 7.7 % — LOW (ref 20.5–51.1)
MCHC RBC-ENTMCNC: 32.5 G/DL — SIGNIFICANT CHANGE UP (ref 32–37)
MCHC RBC-ENTMCNC: 33.8 PG — HIGH (ref 27–31)
MCV RBC AUTO: 104 FL — HIGH (ref 80–94)
MONOCYTES # BLD AUTO: 0.55 K/UL — SIGNIFICANT CHANGE UP (ref 0.1–0.6)
MONOCYTES NFR BLD AUTO: 7 % — SIGNIFICANT CHANGE UP (ref 1.7–9.3)
NEUTROPHILS # BLD AUTO: 6.41 K/UL — SIGNIFICANT CHANGE UP (ref 1.4–6.5)
NEUTROPHILS NFR BLD AUTO: 81.4 % — HIGH (ref 42.2–75.2)
NRBC BLD AUTO-RTO: 0 /100 WBCS — SIGNIFICANT CHANGE UP (ref 0–0)
NT-PROBNP SERPL-SCNC: HIGH PG/ML (ref 0–300)
PLATELET # BLD AUTO: 207 K/UL — SIGNIFICANT CHANGE UP (ref 130–400)
PMV BLD: 11.7 FL — HIGH (ref 7.4–10.4)
POTASSIUM SERPL-MCNC: 5.2 MMOL/L — HIGH (ref 3.5–5)
POTASSIUM SERPL-SCNC: 5.2 MMOL/L — HIGH (ref 3.5–5)
PROT SERPL-MCNC: 7.2 G/DL — SIGNIFICANT CHANGE UP (ref 6–8)
RBC # BLD: 2.78 M/UL — LOW (ref 4.7–6.1)
RBC # FLD: 13.9 % — SIGNIFICANT CHANGE UP (ref 11.5–14.5)
SODIUM SERPL-SCNC: 135 MMOL/L — SIGNIFICANT CHANGE UP (ref 135–146)
SPECIMEN SOURCE: SIGNIFICANT CHANGE UP
SPECIMEN SOURCE: SIGNIFICANT CHANGE UP
TROPONIN T, HIGH SENSITIVITY RESULT: 315 NG/L — CRITICAL HIGH (ref 6–21)
WBC # BLD: 7.88 K/UL — SIGNIFICANT CHANGE UP (ref 4.8–10.8)
WBC # FLD AUTO: 7.88 K/UL — SIGNIFICANT CHANGE UP (ref 4.8–10.8)

## 2025-06-01 PROCEDURE — 71045 X-RAY EXAM CHEST 1 VIEW: CPT | Mod: 26

## 2025-06-01 PROCEDURE — 99285 EMERGENCY DEPT VISIT HI MDM: CPT | Mod: FS

## 2025-06-01 PROCEDURE — 73630 X-RAY EXAM OF FOOT: CPT | Mod: 26,LT

## 2025-06-01 RX ORDER — SODIUM ZIRCONIUM CYCLOSILICATE 5 G/5G
5 POWDER, FOR SUSPENSION ORAL ONCE
Refills: 0 | Status: COMPLETED | OUTPATIENT
Start: 2025-06-01 | End: 2025-06-01

## 2025-06-01 RX ADMIN — SODIUM ZIRCONIUM CYCLOSILICATE 5 GRAM(S): 5 POWDER, FOR SUSPENSION ORAL at 23:12

## 2025-06-01 NOTE — ED PROVIDER NOTE - OBJECTIVE STATEMENT
65-year-old male with a past medical history of end-stage renal disease on hemodialysis Monday Wednesday Friday via left arm AV fistula, diabetes, BPH, CAD status post CABG, hypertension, CHF, TIA, peripheral arterial disease, history of left toe ulcers status post amputations presents to the ED for evaluation abrasion to the left heel which she sustained about an hour ago which has now stopped bleeding.  Patient is on aspirin and Plavix.  Patient denies fever, chills, weakness, or recent trauma.  He patient was recently seen in the ED on May 27 for left lower leg redness and swelling and pain to the left foot.  Patient was seen by podiatry during his hospital visit and consult note which shows the following:   Physical Exam Left Lower Extremity Focused:   Derm:   - Small Hemorrhagic blister to the hallux medial nail fold. Appears stable without any fluctuance.   - Small wound plantar to the heel with a scab. No drainage or bleeding.   Vascular: DP and PT Pulses Diminished; Foot is Warm to the touch; Capillary Refill Time delayed; 1+ pitting edema to the dorsum of the forefoot.   Neuro: Protective Sensation Diminished / Moderately Neuropathic   MSK: Pain On Palpation at Wound Site; Chronic charcot neuropathy; Amputated 5th toe      Assessment:  Hallux Blister    Plan:  Chart reviewed and Patient evaluated. All Questions and Concerns Addressed and Answered  XR Imaging Left Foot; reviewed Results   Weight Bearing Status; WBAT  Recommend; LLE venous duplex to r/o DVT  Patient is stable for discharge from podiatric standpoint if Duplex findings are not concerning for DVT.  Follow up outpatient with Dr. Garnett at podiatry clinic  Patient seen and evaluated with Dr. Garnett    Patient had a left lower extremity duplex on May 27 which was negative for DVT.  Patient had a bilateral lower extremity duplex on May 14 which was negative for any DVT.

## 2025-06-01 NOTE — ED ADULT TRIAGE NOTE - RESPIRATORY RATE (BREATHS/MIN)
Problem: RESPIRATORY - ADULT  Goal: Achieves optimal ventilation and oxygenation  INTERVENTIONS:  - Assess for changes in respiratory status  - Assess for changes in mentation and behavior  - Position to facilitate oxygenation and minimize respiratory effo 18

## 2025-06-01 NOTE — ED PROVIDER NOTE - CARE PLAN
1 Principal Discharge DX:	ESRD on dialysis  Secondary Diagnosis:	Fluid overload  Secondary Diagnosis:	Hypoxia  Secondary Diagnosis:	Abrasion of left heel

## 2025-06-01 NOTE — ED PROVIDER NOTE - DIFFERENTIAL DIAGNOSIS
Differential Diagnosis Electrolyte abnormalities, dehydration, JOHN, hyperglycemia, hypoglycemia, symptomatic anemia.  r/o CHF; r/o cardiac arrhythmia.

## 2025-06-01 NOTE — ED PROVIDER NOTE - PHYSICAL EXAMINATION
Physical Exam    Vital Signs: I have reviewed the initial vital signs.  Constitutional: well-nourished, appears stated age, no acute distress  Eyes: Conjunctiva pink, Sclera clear  Cardiovascular: regular rate, regular rhythm, well-perfused extremities, pedal pulses equal and diminished 1+ b/l.   Respiratory: unlabored respiratory effort, clear to auscultation bilaterally no wheezing, rales and rhonchi. pt is speaking full sentences. no accessory muscle use.   Gastrointestinal: soft, non-tender, nondistended abdomen, no pulsatile mass, no rebound, no guarding  Musculoskeletal: (+) b/l  lower extremity edema, no calf tenderness  Integumentary: warm, dry, no rash, (+) small blood blister to the hallux medial nail fold with active bleeding or fluctuance, small wound plantar to the heel with a scab, (+) linear abrasions to the left heel without active bleeding. amputated left 5th toe  Neurologic: awake, alert.   Psychiatric: appropriate mood, appropriate affect

## 2025-06-01 NOTE — ED ADULT TRIAGE NOTE - HEIGHT IN INCHES
3 WK POSTPARTUM APPT:    Rep C/S 2020 (37 4/7 wk) - gest HTN   "STORMY"    On Nifedipine 30 mg BID since pp appt 2020  Still having swelling in ankles, moreso (R) side  Still with h/a but not as frequent  Lochia - spotting, yellow - had episode of increased bleeding (gush) 2020 the stopped  Normal bowel/bladder habits normal  Taking prenatal vits  Breastfeeding q 2-3 hrs & pumping - good milk supply, moreso (R) side - good latch  Ped - Dr Ravi Juarez - has appt next week - had jaundice & some reflux issue  Had TDAP during pregnancy  Incision - healing well, occas Motrin or Tylenol  Has 6 yr old daughter & 9 hr old son  Completed [place] Dep scale (score = 3)  Birth control - prev on Sprintec (1 1/2 - 2 yrs ago) - hx PCOS - not a good pill taker, maybe Nuvaring - will f/u @ 6 wk re: BP issue  PP packet given  BP recheck = 118/76  Can decrease Nifedipine 30 mg to once daily until 6 wk pp appt/JSW - pt aware - pt has rfs on presc  Return to office for 6 wk pp appt 
8

## 2025-06-01 NOTE — ED PROVIDER NOTE - EKG/XRAY ADDITIONAL INFORMATION
CXR shows no PTx, no free air, no FB.   X-rays shows no Fracture/dislocation. CXR shows no PTx, no free air, no FB.   X-rays shows no Fracture/dislocation.  EKG shows sinus rhythm with first degree AV Block, no STEMI.

## 2025-06-01 NOTE — ED PROVIDER NOTE - ATTENDING APP SHARED VISIT CONTRIBUTION OF CARE
I have personally performed a history and physical exam on this patient. I have personally directed the management of the patient.  Patient is c/o accidental wound to the Lt heal, no other injuries. Patient with h/o ESRD.   Vitals reviewed.  Lungs: CTA, no wheezing, no crackles.

## 2025-06-01 NOTE — ED ADULT TRIAGE NOTE - NS ED NURSE AMBULANCES
11/13/18 1113   Final Note   Assessment Type Final Discharge Note   Anticipated Discharge Disposition Home      [facility]

## 2025-06-02 DIAGNOSIS — N18.6 END STAGE RENAL DISEASE: ICD-10-CM

## 2025-06-02 LAB
A1C WITH ESTIMATED AVERAGE GLUCOSE RESULT: 5.2 % — SIGNIFICANT CHANGE UP (ref 4–5.6)
ALBUMIN SERPL ELPH-MCNC: 3.5 G/DL — SIGNIFICANT CHANGE UP (ref 3.5–5.2)
ALP SERPL-CCNC: 169 U/L — HIGH (ref 30–115)
ALT FLD-CCNC: 15 U/L — SIGNIFICANT CHANGE UP (ref 0–41)
ANION GAP SERPL CALC-SCNC: 17 MMOL/L — HIGH (ref 7–14)
AST SERPL-CCNC: 16 U/L — SIGNIFICANT CHANGE UP (ref 0–41)
BASOPHILS # BLD AUTO: 0.06 K/UL — SIGNIFICANT CHANGE UP (ref 0–0.2)
BASOPHILS NFR BLD AUTO: 0.8 % — SIGNIFICANT CHANGE UP (ref 0–1)
BILIRUB SERPL-MCNC: 0.2 MG/DL — SIGNIFICANT CHANGE UP (ref 0.2–1.2)
BUN SERPL-MCNC: 70 MG/DL — CRITICAL HIGH (ref 10–20)
CALCIUM SERPL-MCNC: 8.6 MG/DL — SIGNIFICANT CHANGE UP (ref 8.4–10.5)
CHLORIDE SERPL-SCNC: 92 MMOL/L — LOW (ref 98–110)
CHOLEST SERPL-MCNC: 81 MG/DL — SIGNIFICANT CHANGE UP
CO2 SERPL-SCNC: 27 MMOL/L — SIGNIFICANT CHANGE UP (ref 17–32)
CREAT SERPL-MCNC: 5.4 MG/DL — CRITICAL HIGH (ref 0.7–1.5)
EGFR: 11 ML/MIN/1.73M2 — LOW
EGFR: 11 ML/MIN/1.73M2 — LOW
EOSINOPHIL # BLD AUTO: 0.19 K/UL — SIGNIFICANT CHANGE UP (ref 0–0.7)
EOSINOPHIL NFR BLD AUTO: 2.5 % — SIGNIFICANT CHANGE UP (ref 0–8)
ESTIMATED AVERAGE GLUCOSE: 103 MG/DL — SIGNIFICANT CHANGE UP (ref 68–114)
GLUCOSE BLDC GLUCOMTR-MCNC: 100 MG/DL — HIGH (ref 70–99)
GLUCOSE BLDC GLUCOMTR-MCNC: 122 MG/DL — HIGH (ref 70–99)
GLUCOSE BLDC GLUCOMTR-MCNC: 125 MG/DL — HIGH (ref 70–99)
GLUCOSE BLDC GLUCOMTR-MCNC: 95 MG/DL — SIGNIFICANT CHANGE UP (ref 70–99)
GLUCOSE SERPL-MCNC: 94 MG/DL — SIGNIFICANT CHANGE UP (ref 70–99)
HCT VFR BLD CALC: 28.3 % — LOW (ref 42–52)
HDLC SERPL-MCNC: 43 MG/DL — SIGNIFICANT CHANGE UP
HGB BLD-MCNC: 9.1 G/DL — LOW (ref 14–18)
IMM GRANULOCYTES NFR BLD AUTO: 0.3 % — SIGNIFICANT CHANGE UP (ref 0.1–0.3)
LDLC SERPL-MCNC: 25 MG/DL — SIGNIFICANT CHANGE UP
LIPID PNL WITH DIRECT LDL SERPL: 25 MG/DL — SIGNIFICANT CHANGE UP
LYMPHOCYTES # BLD AUTO: 0.51 K/UL — LOW (ref 1.2–3.4)
LYMPHOCYTES # BLD AUTO: 6.7 % — LOW (ref 20.5–51.1)
MCHC RBC-ENTMCNC: 32.2 G/DL — SIGNIFICANT CHANGE UP (ref 32–37)
MCHC RBC-ENTMCNC: 33.5 PG — HIGH (ref 27–31)
MCV RBC AUTO: 104 FL — HIGH (ref 80–94)
MONOCYTES # BLD AUTO: 0.56 K/UL — SIGNIFICANT CHANGE UP (ref 0.1–0.6)
MONOCYTES NFR BLD AUTO: 7.3 % — SIGNIFICANT CHANGE UP (ref 1.7–9.3)
MRSA PCR RESULT.: POSITIVE
NEUTROPHILS # BLD AUTO: 6.28 K/UL — SIGNIFICANT CHANGE UP (ref 1.4–6.5)
NEUTROPHILS NFR BLD AUTO: 82.4 % — HIGH (ref 42.2–75.2)
NONHDLC SERPL-MCNC: 38 MG/DL — SIGNIFICANT CHANGE UP
NRBC BLD AUTO-RTO: 0 /100 WBCS — SIGNIFICANT CHANGE UP (ref 0–0)
PLATELET # BLD AUTO: 204 K/UL — SIGNIFICANT CHANGE UP (ref 130–400)
PMV BLD: 11.3 FL — HIGH (ref 7.4–10.4)
POTASSIUM SERPL-MCNC: 5 MMOL/L — SIGNIFICANT CHANGE UP (ref 3.5–5)
POTASSIUM SERPL-SCNC: 5 MMOL/L — SIGNIFICANT CHANGE UP (ref 3.5–5)
PROT SERPL-MCNC: 6.8 G/DL — SIGNIFICANT CHANGE UP (ref 6–8)
RBC # BLD: 2.72 M/UL — LOW (ref 4.7–6.1)
RBC # FLD: 13.9 % — SIGNIFICANT CHANGE UP (ref 11.5–14.5)
SODIUM SERPL-SCNC: 136 MMOL/L — SIGNIFICANT CHANGE UP (ref 135–146)
TRIGL SERPL-MCNC: 52 MG/DL — SIGNIFICANT CHANGE UP
TROPONIN T, HIGH SENSITIVITY RESULT: 317 NG/L — CRITICAL HIGH (ref 6–21)
WBC # BLD: 7.62 K/UL — SIGNIFICANT CHANGE UP (ref 4.8–10.8)
WBC # FLD AUTO: 7.62 K/UL — SIGNIFICANT CHANGE UP (ref 4.8–10.8)

## 2025-06-02 PROCEDURE — 71045 X-RAY EXAM CHEST 1 VIEW: CPT | Mod: 26

## 2025-06-02 PROCEDURE — 99223 1ST HOSP IP/OBS HIGH 75: CPT

## 2025-06-02 PROCEDURE — 80074 ACUTE HEPATITIS PANEL: CPT

## 2025-06-02 PROCEDURE — 80061 LIPID PANEL: CPT

## 2025-06-02 PROCEDURE — 87641 MR-STAPH DNA AMP PROBE: CPT

## 2025-06-02 PROCEDURE — 97530 THERAPEUTIC ACTIVITIES: CPT | Mod: GP

## 2025-06-02 PROCEDURE — 83036 HEMOGLOBIN GLYCOSYLATED A1C: CPT

## 2025-06-02 PROCEDURE — 87640 STAPH A DNA AMP PROBE: CPT

## 2025-06-02 PROCEDURE — 71045 X-RAY EXAM CHEST 1 VIEW: CPT

## 2025-06-02 PROCEDURE — 85025 COMPLETE CBC W/AUTO DIFF WBC: CPT

## 2025-06-02 PROCEDURE — 36415 COLL VENOUS BLD VENIPUNCTURE: CPT

## 2025-06-02 PROCEDURE — 11721 DEBRIDE NAIL 6 OR MORE: CPT | Mod: GC

## 2025-06-02 PROCEDURE — 90935 HEMODIALYSIS ONE EVALUATION: CPT

## 2025-06-02 PROCEDURE — 94640 AIRWAY INHALATION TREATMENT: CPT

## 2025-06-02 PROCEDURE — 82962 GLUCOSE BLOOD TEST: CPT

## 2025-06-02 PROCEDURE — 93925 LOWER EXTREMITY STUDY: CPT

## 2025-06-02 PROCEDURE — 80048 BASIC METABOLIC PNL TOTAL CA: CPT

## 2025-06-02 PROCEDURE — 93925 LOWER EXTREMITY STUDY: CPT | Mod: 26

## 2025-06-02 PROCEDURE — 97116 GAIT TRAINING THERAPY: CPT | Mod: GP

## 2025-06-02 PROCEDURE — 97162 PT EVAL MOD COMPLEX 30 MIN: CPT | Mod: GP

## 2025-06-02 PROCEDURE — 86706 HEP B SURFACE ANTIBODY: CPT

## 2025-06-02 PROCEDURE — 80053 COMPREHEN METABOLIC PANEL: CPT

## 2025-06-02 PROCEDURE — 99221 1ST HOSP IP/OBS SF/LOW 40: CPT | Mod: GC,25

## 2025-06-02 RX ORDER — DEXTROSE 50 % IN WATER 50 %
25 SYRINGE (ML) INTRAVENOUS ONCE
Refills: 0 | Status: DISCONTINUED | OUTPATIENT
Start: 2025-06-02 | End: 2025-06-13

## 2025-06-02 RX ORDER — DEXTROSE 50 % IN WATER 50 %
12.5 SYRINGE (ML) INTRAVENOUS ONCE
Refills: 0 | Status: DISCONTINUED | OUTPATIENT
Start: 2025-06-02 | End: 2025-06-13

## 2025-06-02 RX ORDER — METOPROLOL SUCCINATE 50 MG/1
25 TABLET, EXTENDED RELEASE ORAL DAILY
Refills: 0 | Status: DISCONTINUED | OUTPATIENT
Start: 2025-06-02 | End: 2025-06-02

## 2025-06-02 RX ORDER — ACETAMINOPHEN 500 MG/5ML
650 LIQUID (ML) ORAL EVERY 6 HOURS
Refills: 0 | Status: DISCONTINUED | OUTPATIENT
Start: 2025-06-02 | End: 2025-06-13

## 2025-06-02 RX ORDER — GLUCAGON 3 MG/1
1 POWDER NASAL ONCE
Refills: 0 | Status: DISCONTINUED | OUTPATIENT
Start: 2025-06-02 | End: 2025-06-13

## 2025-06-02 RX ORDER — FOLIC ACID 1 MG/1
1 TABLET ORAL DAILY
Refills: 0 | Status: DISCONTINUED | OUTPATIENT
Start: 2025-06-02 | End: 2025-06-13

## 2025-06-02 RX ORDER — METOPROLOL SUCCINATE 50 MG/1
50 TABLET, EXTENDED RELEASE ORAL
Refills: 0 | Status: DISCONTINUED | OUTPATIENT
Start: 2025-06-02 | End: 2025-06-12

## 2025-06-02 RX ORDER — AMPICILLIN SODIUM AND SULBACTAM SODIUM 1; .5 G/1; G/1
INJECTION, POWDER, FOR SOLUTION INTRAMUSCULAR; INTRAVENOUS
Refills: 0 | Status: DISCONTINUED | OUTPATIENT
Start: 2025-06-02 | End: 2025-06-03

## 2025-06-02 RX ORDER — SODIUM CHLORIDE 9 G/1000ML
1000 INJECTION, SOLUTION INTRAVENOUS
Refills: 0 | Status: DISCONTINUED | OUTPATIENT
Start: 2025-06-02 | End: 2025-06-13

## 2025-06-02 RX ORDER — METOPROLOL SUCCINATE 50 MG/1
100 TABLET, EXTENDED RELEASE ORAL DAILY
Refills: 0 | Status: DISCONTINUED | OUTPATIENT
Start: 2025-06-02 | End: 2025-06-02

## 2025-06-02 RX ORDER — MELATONIN 5 MG
3 TABLET ORAL AT BEDTIME
Refills: 0 | Status: DISCONTINUED | OUTPATIENT
Start: 2025-06-02 | End: 2025-06-13

## 2025-06-02 RX ORDER — ONDANSETRON HCL/PF 4 MG/2 ML
4 VIAL (ML) INJECTION EVERY 8 HOURS
Refills: 0 | Status: DISCONTINUED | OUTPATIENT
Start: 2025-06-02 | End: 2025-06-13

## 2025-06-02 RX ORDER — ASPIRIN 325 MG
81 TABLET ORAL DAILY
Refills: 0 | Status: DISCONTINUED | OUTPATIENT
Start: 2025-06-02 | End: 2025-06-13

## 2025-06-02 RX ORDER — METOCLOPRAMIDE HCL 10 MG
5 TABLET ORAL
Refills: 0 | Status: DISCONTINUED | OUTPATIENT
Start: 2025-06-02 | End: 2025-06-13

## 2025-06-02 RX ORDER — AMPICILLIN SODIUM AND SULBACTAM SODIUM 1; .5 G/1; G/1
1.5 INJECTION, POWDER, FOR SOLUTION INTRAMUSCULAR; INTRAVENOUS EVERY 24 HOURS
Refills: 0 | Status: DISCONTINUED | OUTPATIENT
Start: 2025-06-03 | End: 2025-06-03

## 2025-06-02 RX ORDER — SENNA 187 MG
2 TABLET ORAL AT BEDTIME
Refills: 0 | Status: DISCONTINUED | OUTPATIENT
Start: 2025-06-02 | End: 2025-06-13

## 2025-06-02 RX ORDER — SEVELAMER HYDROCHLORIDE 800 MG/1
1600 TABLET ORAL
Refills: 0 | Status: DISCONTINUED | OUTPATIENT
Start: 2025-06-02 | End: 2025-06-13

## 2025-06-02 RX ORDER — DEXTROSE 50 % IN WATER 50 %
15 SYRINGE (ML) INTRAVENOUS ONCE
Refills: 0 | Status: DISCONTINUED | OUTPATIENT
Start: 2025-06-02 | End: 2025-06-13

## 2025-06-02 RX ORDER — HEPARIN SODIUM 1000 [USP'U]/ML
5000 INJECTION INTRAVENOUS; SUBCUTANEOUS EVERY 12 HOURS
Refills: 0 | Status: DISCONTINUED | OUTPATIENT
Start: 2025-06-02 | End: 2025-06-13

## 2025-06-02 RX ORDER — ATORVASTATIN CALCIUM 80 MG/1
40 TABLET, FILM COATED ORAL AT BEDTIME
Refills: 0 | Status: DISCONTINUED | OUTPATIENT
Start: 2025-06-02 | End: 2025-06-13

## 2025-06-02 RX ORDER — AMPICILLIN SODIUM AND SULBACTAM SODIUM 1; .5 G/1; G/1
1.5 INJECTION, POWDER, FOR SOLUTION INTRAMUSCULAR; INTRAVENOUS ONCE
Refills: 0 | Status: COMPLETED | OUTPATIENT
Start: 2025-06-02 | End: 2025-06-02

## 2025-06-02 RX ORDER — CLOPIDOGREL BISULFATE 75 MG/1
75 TABLET, FILM COATED ORAL DAILY
Refills: 0 | Status: DISCONTINUED | OUTPATIENT
Start: 2025-06-02 | End: 2025-06-13

## 2025-06-02 RX ORDER — INSULIN GLARGINE-YFGN 100 [IU]/ML
40 INJECTION, SOLUTION SUBCUTANEOUS AT BEDTIME
Refills: 0 | Status: DISCONTINUED | OUTPATIENT
Start: 2025-06-02 | End: 2025-06-04

## 2025-06-02 RX ORDER — MAGNESIUM, ALUMINUM HYDROXIDE 200-200 MG
30 TABLET,CHEWABLE ORAL EVERY 4 HOURS
Refills: 0 | Status: DISCONTINUED | OUTPATIENT
Start: 2025-06-02 | End: 2025-06-13

## 2025-06-02 RX ORDER — INSULIN LISPRO 100 U/ML
INJECTION, SOLUTION INTRAVENOUS; SUBCUTANEOUS
Refills: 0 | Status: DISCONTINUED | OUTPATIENT
Start: 2025-06-02 | End: 2025-06-09

## 2025-06-02 RX ORDER — FUROSEMIDE 10 MG/ML
80 INJECTION INTRAMUSCULAR; INTRAVENOUS DAILY
Refills: 0 | Status: DISCONTINUED | OUTPATIENT
Start: 2025-06-02 | End: 2025-06-05

## 2025-06-02 RX ORDER — NIFEDIPINE 30 MG
30 TABLET, EXTENDED RELEASE 24 HR ORAL DAILY
Refills: 0 | Status: DISCONTINUED | OUTPATIENT
Start: 2025-06-02 | End: 2025-06-13

## 2025-06-02 RX ORDER — CYANOCOBALAMIN 1000 UG/ML
1000 INJECTION INTRAMUSCULAR; SUBCUTANEOUS DAILY
Refills: 0 | Status: DISCONTINUED | OUTPATIENT
Start: 2025-06-02 | End: 2025-06-13

## 2025-06-02 RX ORDER — BACITRACIN 500 UNIT/G
1 OINTMENT (GRAM) TOPICAL DAILY
Refills: 0 | Status: DISCONTINUED | OUTPATIENT
Start: 2025-06-02 | End: 2025-06-13

## 2025-06-02 RX ORDER — SENNA 187 MG
2 TABLET ORAL AT BEDTIME
Refills: 0 | Status: DISCONTINUED | OUTPATIENT
Start: 2025-06-02 | End: 2025-06-02

## 2025-06-02 RX ORDER — AMPICILLIN SODIUM AND SULBACTAM SODIUM 1; .5 G/1; G/1
INJECTION, POWDER, FOR SOLUTION INTRAMUSCULAR; INTRAVENOUS
Refills: 0 | Status: DISCONTINUED | OUTPATIENT
Start: 2025-06-02 | End: 2025-06-02

## 2025-06-02 RX ORDER — METOPROLOL SUCCINATE 50 MG/1
1 TABLET, EXTENDED RELEASE ORAL
Refills: 0 | DISCHARGE

## 2025-06-02 RX ADMIN — METOPROLOL SUCCINATE 50 MILLIGRAM(S): 50 TABLET, EXTENDED RELEASE ORAL at 17:33

## 2025-06-02 RX ADMIN — SEVELAMER HYDROCHLORIDE 1600 MILLIGRAM(S): 800 TABLET ORAL at 07:53

## 2025-06-02 RX ADMIN — Medication 5 MILLIGRAM(S): at 06:13

## 2025-06-02 RX ADMIN — HEPARIN SODIUM 5000 UNIT(S): 1000 INJECTION INTRAVENOUS; SUBCUTANEOUS at 17:35

## 2025-06-02 RX ADMIN — Medication 1 APPLICATION(S): at 11:45

## 2025-06-02 RX ADMIN — Medication 81 MILLIGRAM(S): at 11:43

## 2025-06-02 RX ADMIN — CLOPIDOGREL BISULFATE 75 MILLIGRAM(S): 75 TABLET, FILM COATED ORAL at 11:43

## 2025-06-02 RX ADMIN — Medication 40 MILLIGRAM(S): at 06:14

## 2025-06-02 RX ADMIN — CYANOCOBALAMIN 1000 MICROGRAM(S): 1000 INJECTION INTRAMUSCULAR; SUBCUTANEOUS at 11:43

## 2025-06-02 RX ADMIN — AMPICILLIN SODIUM AND SULBACTAM SODIUM 100 GRAM(S): 1; .5 INJECTION, POWDER, FOR SOLUTION INTRAMUSCULAR; INTRAVENOUS at 01:50

## 2025-06-02 RX ADMIN — Medication 1 APPLICATION(S): at 21:48

## 2025-06-02 RX ADMIN — FUROSEMIDE 80 MILLIGRAM(S): 10 INJECTION INTRAMUSCULAR; INTRAVENOUS at 06:15

## 2025-06-02 RX ADMIN — SEVELAMER HYDROCHLORIDE 1600 MILLIGRAM(S): 800 TABLET ORAL at 17:34

## 2025-06-02 RX ADMIN — Medication 5 MILLIGRAM(S): at 17:33

## 2025-06-02 RX ADMIN — SEVELAMER HYDROCHLORIDE 1600 MILLIGRAM(S): 800 TABLET ORAL at 11:43

## 2025-06-02 RX ADMIN — Medication 30 MILLIGRAM(S): at 06:15

## 2025-06-02 RX ADMIN — FOLIC ACID 1 MILLIGRAM(S): 1 TABLET ORAL at 11:43

## 2025-06-02 RX ADMIN — ATORVASTATIN CALCIUM 40 MILLIGRAM(S): 80 TABLET, FILM COATED ORAL at 21:48

## 2025-06-02 RX ADMIN — METOPROLOL SUCCINATE 50 MILLIGRAM(S): 50 TABLET, EXTENDED RELEASE ORAL at 06:15

## 2025-06-02 NOTE — PATIENT PROFILE ADULT - FALL HARM RISK - HARM RISK INTERVENTIONS

## 2025-06-02 NOTE — H&P ADULT - NSHPPHYSICALEXAM_GEN_ALL_CORE
T(C): 36.9 (06-02-25 @ 00:34), Max: 36.9 (06-02-25 @ 00:34)  HR: 74 (06-02-25 @ 00:34) (72 - 74)  BP: 135/68 (06-02-25 @ 00:34) (124/72 - 135/68)  RR: 18 (06-02-25 @ 00:34) (18 - 18)  SpO2: 94% (06-02-25 @ 00:34) (92% - 94%)    CONSTITUTIONAL: Well groomed, no apparent distress  EYES: PERRLA and symmetric, EOMI, No conjunctival or scleral injection, non-icteric  ENMT: Oral mucosa with moist membranes. Normal dentition; no pharyngeal injection or exudates             NECK: Supple, symmetric and without tracheal deviation   RESP: No respiratory distress, no use of accessory muscles; CTA b/l, no WRR  CV: RRR, +S1S2, no MRG; no JVD; no peripheral edema  GI: Soft, NT, ND, no rebound, no guarding; no palpable masses; no hepatosplenomegaly; no hernia palpated  LYMPH: No cervical LAD or tenderness; no axillary LAD or tenderness; no inguinal LAD or tenderness  MSK: Normal gait; No digital clubbing or cyanosis; examination of the (head/neck/spine/ribs/pelvis, RUE, LUE, RLE, LLE) without misalignment,            Normal ROM without pain, no spinal tenderness, normal muscle strength/tone  SKIN: No rashes or ulcers noted; no subcutaneous nodules or induration palpable  NEURO: CN II-XII intact; normal reflexes in upper and lower extremities, sensation intact in upper and lower extremities b/l to light touch   PSYCH: Appropriate insight/judgment; A+O x 3, mood and affect appropriate, recent/remote memory intact

## 2025-06-02 NOTE — H&P ADULT - ATTENDING COMMENTS
65-year-old male with a past medical history of end-stage renal disease on hemodialysis Monday Wednesday Friday via left arm AV fistula, diabetes, BPH, CAD status post CABG, hypertension, CHF, TIA, peripheral arterial disease, history of left toe ulcers status post amputations presents to the ED for evaluation abrasion to the left heel which she sustained about an hour ago which has now stopped bleeding.     Agree  with assessment  except for changes below.   Vital Signs Last 24 Hrs  T(C): 36.6 (02 Jun 2025 04:43), Max: 36.9 (02 Jun 2025 00:34)  T(F): 97.8 (02 Jun 2025 04:43), Max: 98.5 (02 Jun 2025 00:34)  HR: 81 (02 Jun 2025 04:43) (72 - 82)  BP: 149/72 (02 Jun 2025 04:43) (124/72 - 151/75)  BP(mean): 97 (02 Jun 2025 04:43) (97 - 101)  RR: 18 (02 Jun 2025 04:43) (18 - 18)  SpO2: 90% (02 Jun 2025 04:43) (90% - 96%)    Parameters below as of 02 Jun 2025 01:23    O2 Flow (L/min): 3    IMPRESSION   Heel Ulcer Surrounding Cellulitis  Abx Wound care  Podiatry Eval  Mild Hypoxic  respiratory Failure Like  Secondary to  Volume Overload  Chest X-Ray B/L opacities  ESRD HD today will need Volume Removal  Hx CAD sp CABG  Hx PAD  Hx DM    Seen on 06/02

## 2025-06-02 NOTE — PROGRESS NOTE ADULT - SUBJECTIVE AND OBJECTIVE BOX
Patient was examined during HD treatment. Tolerating well. Please see my complete consult from earlier today.    Hemodialysis Prescription:  	Access: AVF  	Dialyzer: Opti 160  	Blood Flow (mL/Min): 400  	Dialysate Flow (mL/Min): 500  	Target UF (Liters): 3.5  	Treatment Time: 3 hours  	Potassium: 2K  	Calcium: 2.5

## 2025-06-02 NOTE — H&P ADULT - HISTORY OF PRESENT ILLNESS
65-year-old male with a past medical history of end-stage renal disease on hemodialysis Monday Wednesday Friday via left arm AV fistula, diabetes, BPH, CAD status post CABG, hypertension, CHF, TIA, peripheral arterial disease, history of left toe ulcers status post amputations presents to the ED for evaluation abrasion to the left heel which she sustained about an hour ago which has now stopped bleeding.  Patient is on aspirin and Plavix.  Patient denies fever, chills, weakness, or recent trauma.    sp lokelma in the ED    Troponin T, High Sensitivity Result: 315:     < from: Xray Foot AP + Lateral + Oblique, Left (06.01.25 @ 22:02) >  FINDINGS /  IMPRESSION:    Grossly unchanged appearance. Status post left fifth transmetatarsal   amputation. Stable severe Charcot arthropathy with loss of normal plantar   arch. Severe midfoot and forefootdegenerative changes. Redemonstrated   dorsal soft tissue swelling. Vascular calcifications.    --- End of Report ---    < end of copied text >

## 2025-06-02 NOTE — CONSULT NOTE ADULT - SUBJECTIVE AND OBJECTIVE BOX
[facility] INITIAL CONSULT NOTE  --------------------------------------------------------------------------------  HPI:  65-year-old male with a past medical history of end-stage renal disease on hemodialysis Monday Wednesday Friday via left arm AV fistula at [facility] under the care of Dr Baca, diabetes, BPH, CAD status post CABG, hypertension, CHF, TIA, peripheral arterial disease, history of left toe ulcers status post amputations presents to the ED for evaluation abrasion to the left heel which she sustained about an hour ago which has now stopped bleeding.  Patient is on aspirin and Plavix.  Patient denies fever, chills, weakness, or recent trauma.    PAST HISTORY  --------------------------------------------------------------------------------  PAST MEDICAL & SURGICAL HISTORY:  S/P CABG (coronary artery bypass graft)x4  Diabetes mellitus  Transient ischemic attack (TIA) 2017; 2008  ESRD  Hypertension  Stented coronary artery in 2008  Myocardial infarction 2012  PAD (peripheral artery disease) S/p bypass left leg  HLD (hyperlipidemia)  BPH (benign prostatic hyperplasia)  OA (osteoarthritis)  [place] (hard of hearing)  Chronic anemia  Left CEA (2017)  Left 5th toe Amputation (2014)  AV fistula 2019  LEFT AV FISTULA    FAMILY HISTORY:  Family history of heart disease (Father)  DM (diabetes mellitus) (Mother)  ESRD (end stage renal disease) on dialysis (Mother)    SOCIAL HISTORY:  No current ETOH, tobacco, or illicit drug use    ALLERGIES & MEDICATIONS  --------------------------------------------------------------------------------  Allergies    No Known Allergies    Standing Inpatient Medications  ampicillin/sulbactam  IVPB      aspirin  chewable 81 milliGRAM(s) Oral daily  atorvastatin 40 milliGRAM(s) Oral at bedtime  chlorhexidine 2% Cloths 1 Application(s) Topical daily  clopidogrel Tablet 75 milliGRAM(s) Oral daily  cyanocobalamin 1000 MICROGram(s) Oral daily  dextrose 5%. 1000 milliLiter(s) IV Continuous <Continuous>  dextrose 5%. 1000 milliLiter(s) IV Continuous <Continuous>  dextrose 50% Injectable 25 Gram(s) IV Push once  dextrose 50% Injectable 12.5 Gram(s) IV Push once  dextrose 50% Injectable 25 Gram(s) IV Push once  fluticasone propionate/ salmeterol 250-50 MICROgram(s) Diskus 1 Dose(s) Inhalation two times a day  folic acid 1 milliGRAM(s) Oral daily  furosemide    Tablet 80 milliGRAM(s) Oral daily  glucagon  Injectable 1 milliGRAM(s) IntraMuscular once  heparin   Injectable 5000 Unit(s) SubCutaneous every 12 hours  insulin glargine Injectable (LANTUS) 40 Unit(s) SubCutaneous at bedtime  insulin lispro (ADMELOG) corrective regimen sliding scale   SubCutaneous three times a day before meals  metoclopramide 5 milliGRAM(s) Oral two times a day  metoprolol tartrate 50 milliGRAM(s) Oral two times a day  NIFEdipine XL 30 milliGRAM(s) Oral daily  pantoprazole    Tablet 40 milliGRAM(s) Oral before breakfast  senna 2 Tablet(s) Oral at bedtime  sevelamer carbonate 1600 milliGRAM(s) Oral three times a day with meals    PRN Inpatient Medications  acetaminophen     Tablet .. 650 milliGRAM(s) Oral every 6 hours PRN  aluminum hydroxide/magnesium hydroxide/simethicone Suspension 30 milliLiter(s) Oral every 4 hours PRN  dextrose Oral Gel 15 Gram(s) Oral once PRN  melatonin 3 milliGRAM(s) Oral at bedtime PRN  ondansetron Injectable 4 milliGRAM(s) IV Push every 8 hours PRN    REVIEW OF SYSTEMS  --------------------------------------------------------------------------------  Gen: No fevers/chills  Skin: No rashes  Head/Eyes/Ears/Mouth: No headache; No sinus pain/discomfort, sore throat  Respiratory: No dyspnea, cough, wheezing, hemoptysis  CV: No chest pain, PND, orthopnea  GI: No abdominal pain, diarrhea, constipation, nausea, vomiting, melena, hematochezia  : No increased frequency, dysuria, hematuria, nocturia  All other systems were reviewed and are negative, except as noted.    VITALS/PHYSICAL EXAM  --------------------------------------------------------------------------------  T(C): 36.6 (06-02-25 @ 04:43), Max: 36.9 (06-02-25 @ 00:34)  HR: 81 (06-02-25 @ 04:43) (72 - 82)  BP: 149/72 (06-02-25 @ 04:43) (124/72 - 151/75)  RR: 18 (06-02-25 @ 04:43) (18 - 18)  SpO2: 90% (06-02-25 @ 04:43) (90% - 96%)  Height (cm): 180.3 (06-02-25 @ 01:23)  Weight (kg): 86.1 (06-02-25 @ 01:23)  BMI (kg/m2): 26.5 (06-02-25 @ 01:23)  BSA (m2): 2.06 (06-02-25 @ 01:23)    06-02-25 @ 07:01  -  06-02-25 @ 11:23  --------------------------------------------------------  IN: 236 mL / OUT: 0 mL / NET: 236 mL    Physical Exam:  	Gen: NAD  	Pulm: CTA B/L  	CV: RRR, S1S2  	Back: No CVA tenderness; no sacral edema  	Abd: +BS, soft, nontender/nondistended  	: No suprapubic tenderness  	UE: Warm, no asterixis  	LE: Warm,  edema  	Neuro: AAO x3  	Psych: Normal affect and mood  	Skin: Warm, without rashes  	Vascular access: AVF    LABS/STUDIES  --------------------------------------------------------------------------------              9.1    7.62  >-----------<  204      [06-02-25 @ 06:24]              28.3     136  |  92  |  70  ----------------------------<  94      [06-02-25 @ 06:24]  5.0   |  27  |  5.4        Ca     8.6     [06-02-25 @ 06:24]    TPro  6.8  /  Alb  3.5  /  TBili  0.2  /  DBili  x   /  AST  16  /  ALT  15  /  AlkPhos  169  [06-02-25 @ 06:24]      Creatinine Trend:  SCr 5.4 [06-02 @ 06:24]  SCr 5.2 [06-01 @ 21:46]  SCr 5.1 [05-27 @ 16:09]  SCr 3.7 [05-14 @ 16:25]    Urinalysis - [06-02-25 @ 06:24]      Color  / Appearance  / SG  / pH       Gluc 94 / Ketone   / Bili  / Urobili        Blood  / Protein  / Leuk Est  / Nitrite       RBC  / WBC  / Hyaline  / Gran  / Sq Epi  / Non Sq Epi  / Bacteria     Iron 93, TIBC --, %sat --      [10-01-24 @ 07:00]  Ferritin 1129      [10-01-24 @ 07:00]  PTH -- (Ca 7.8)      [09-24-24 @ 06:56]   135  Vitamin D (25OH) 15      [09-25-24 @ 06:49]  Lipid: chol 81, TG 52, HDL 43, LDL --      [06-02-25 @ 06:24]    HBsAb Nonreact      [11-03-22 @ 06:40]  HBsAg Nonreact      [11-03-22 @ 06:40]  HBcAb Nonreact      [11-03-22 @ 06:40]  HCV 0.14, Nonreact      [10-27-22 @ 04:09]

## 2025-06-02 NOTE — H&P ADULT - NSHPLABSRESULTS_GEN_ALL_CORE
CBC Full  -  ( 01 Jun 2025 21:46 )  WBC Count : 7.88 K/uL  RBC Count : 2.78 M/uL  Hemoglobin : 9.4 g/dL  Hematocrit : 28.9 %  Platelet Count - Automated : 207 K/uL  Mean Cell Volume : 104.0 fL  Mean Cell Hemoglobin : 33.8 pg  Mean Cell Hemoglobin Concentration : 32.5 g/dL  Auto Neutrophil # : x  Auto Lymphocyte # : x  Auto Monocyte # : x  Auto Eosinophil # : x  Auto Basophil # : x  Auto Neutrophil % : x  Auto Lymphocyte % : x  Auto Monocyte % : x  Auto Eosinophil % : x  Auto Basophil % : x    06-01    135  |  93[L]  |  67[HH]  ----------------------------<  157[H]  5.2[H]   |  27  |  5.2[HH]    Ca    8.7      01 Jun 2025 21:46    TPro  7.2  /  Alb  3.6  /  TBili  0.2  /  DBili  x   /  AST  25  /  ALT  17  /  AlkPhos  183[H]  06-01

## 2025-06-02 NOTE — H&P ADULT - ASSESSMENT
65-year-old male with a past medical history of end-stage renal disease on hemodialysis Monday Wednesday Friday via left arm AV fistula, diabetes, BPH, CAD status post CABG, hypertension, CHF, TIA, peripheral arterial disease, history of left toe ulcers status post amputations presents to the ED for evaluation abrasion to the left heel which she sustained about an hour ago which has now stopped bleeding.  Patient is on aspirin and Plavix.  Patient denies fever, chills, weakness, or recent trauma.    #Heel Ulcer  - Uynasn 1.5mg q24 hrs  - Podiatry consult  - WBAT  - wound dressing and care  - left arterial duplex to rule out severe PAD    #ESRD  - MWF  - nephro consult  - Renagel 1600 tid  - trend chemistry and correct as needed  - sp lokelma in the ED   - Nifedpine 30mg  - Lasix     #CAD sp CABG  #PAD  - arterial duplex for non healing ulcer  - ASA PLAVIX  - home simvestatin swithced to atorvastatn  - LL VAS art     #DM2  - Lantus 40 home dose cont.  - ISS  - on home 5,5,5 lispro  - BS    #DVT: Heparin  #GI: pantoprazole

## 2025-06-02 NOTE — CONSULT NOTE ADULT - ATTENDING COMMENTS
Diabetic Foot Check - hx of toe amp, infections, and Chronic Charcot   Dbx of nails x 8   Superficial pressure sore L Foot - padding with Allevyn pads   WBAT B/L feet - use boot for the L foot  Follow up as o/p

## 2025-06-02 NOTE — CONSULT NOTE ADULT - SUBJECTIVE AND OBJECTIVE BOX
Podiatry Consult Note    Subjective:  SCHWABACHER, LAWRENCE  Seen Bedside 65y Male  .   Patient is a 65y old  Male who presents with a chief complaint of wound of left foot. Podiatry was consulted for wound of left foot.   HPI:  65-year-old male with a past medical history of end-stage renal disease on hemodialysis Monday Wednesday Friday via left arm AV fistula, diabetes, BPH, CAD status post CABG, hypertension, CHF, TIA, peripheral arterial disease, history of left toe ulcers status post amputations presents to the ED for evaluation abrasion to the left heel which she sustained about an hour ago which has now stopped bleeding.  Patient is on aspirin and Plavix.  Patient denies fever, chills, weakness, or recent trauma.    sp lokelma in the ED    Troponin T, High Sensitivity Result: 315:     < from: Xray Foot AP + Lateral + Oblique, Left (06.01.25 @ 22:02) >  FINDINGS /  IMPRESSION:    Grossly unchanged appearance. Status post left fifth transmetatarsal   amputation. Stable severe Charcot arthropathy with loss of normal plantar   arch. Severe midfoot and forefootdegenerative changes. Redemonstrated   dorsal soft tissue swelling. Vascular calcifications.    --- End of Report ---    < end of copied text >     (02 Jun 2025 00:05)      Past Medical History and Surgical History  PAST MEDICAL & SURGICAL HISTORY:  S/P CABG (coronary artery bypass graft)  x4      Diabetes mellitus      Transient ischemic attack (TIA)  2017; 2008      Chronic kidney disease (CKD)  Stage IV      Hypertension      Stented coronary artery  in 2008      Myocardial infarction  2012      PAD (peripheral artery disease)  S/p bypass left leg      HLD (hyperlipidemia)      BPH (benign prostatic hyperplasia)      Pain in left knee  s/p fall      OA (osteoarthritis)      [place] (hard of hearing)      Chronic anemia      History of medical problems  left arm precaution      S/P CABG (coronary artery bypass graft)  2012      H/O arterial bypass of lower limb  Left Lower Extremity (2016)      History of surgery  Left CEA (2017)  Left Pinkie toe Amputation (2014)  CABG x 4 (2012)  Card cath - stent (2008)        AV fistula  2019  LEFT AV FISTULA           Review of Systems:  [X] Ten point review of systems is otherwise negative except as noted     Objective:  Vital Signs Last 24 Hrs  T(C): 36.6 (02 Jun 2025 04:43), Max: 36.9 (02 Jun 2025 00:34)  T(F): 97.8 (02 Jun 2025 04:43), Max: 98.5 (02 Jun 2025 00:34)  HR: 81 (02 Jun 2025 04:43) (72 - 82)  BP: 149/72 (02 Jun 2025 04:43) (124/72 - 151/75)  BP(mean): 97 (02 Jun 2025 04:43) (97 - 101)  RR: 18 (02 Jun 2025 04:43) (18 - 18)  SpO2: 90% (02 Jun 2025 04:43) (90% - 96%)    Parameters below as of 02 Jun 2025 01:23    O2 Flow (L/min): 3                          9.1    7.62  )-----------( 204      ( 02 Jun 2025 06:24 )             28.3                 06-02    136  |  92[L]  |  70[HH]  ----------------------------<  94  5.0   |  27  |  5.4[HH]    Ca    8.6      02 Jun 2025 06:24    TPro  6.8  /  Alb  3.5  /  TBili  0.2  /  DBili  x   /  AST  16  /  ALT  15  /  AlkPhos  169[H]  06-02        Physical Exam - Lower Extremity Focused:   Derm:  Pressure ulcers detected on plantar surface of left hindfoot and at distal tip of left hallux, minimal signs of erythema, no signs of active drainage no clinical signs of infection,  Vascular: DP and PT Pulses Intact; Foot is Warm to Warm to the touch; Capillary Refill Time < 3 Seconds; Pitting Edema +2 at medial ankle of LF  Neuro: Protective Sensation Diminished / Moderately Neuropathic   MSK: No Pain On Palpation at Wound Site     Assessment:  Pressure Ulcers, Left foot    Plan:  Chart reviewed and Patient evaluated. All Questions and Concerns Addressed and Answered  XR Imaging  Foot; Results reviewed- No clinical signs of Soft Tissue Infection  Local Wound Care; Wound dressed w/ Xerofrom/ Gauze / DSD / Kerlix / Ace bandage.  Weight Bearing Status; WBAT   No Sx Debridement.   Patient is stable from Podiatry standpoint and could follow up in outpatient clinic.   Discussed Plan w/ Dr. Zarate.    Podiatry  Podiatry Consult Note    Subjective:  SCHWABACHER, LAWRENCE  Seen Bedside 65y Male  .   Patient is a 65y old  Male who presents with a chief complaint of wound of left foot. Podiatry was consulted for wound of left foot.   HPI:  65-year-old male with a past medical history of end-stage renal disease on hemodialysis Monday Wednesday Friday via left arm AV fistula, diabetes, BPH, CAD status post CABG, hypertension, CHF, TIA, peripheral arterial disease, history of left toe ulcers status post amputations presents to the ED for evaluation abrasion to the left heel which she sustained about an hour ago which has now stopped bleeding.  Patient is on aspirin and Plavix.  Patient denies fever, chills, weakness, or recent trauma.    sp lokelma in the ED    Troponin T, High Sensitivity Result: 315:     < from: Xray Foot AP + Lateral + Oblique, Left (06.01.25 @ 22:02) >  FINDINGS /  IMPRESSION:    Grossly unchanged appearance. Status post left fifth transmetatarsal   amputation. Stable severe Charcot arthropathy with loss of normal plantar   arch. Severe midfoot and forefootdegenerative changes. Redemonstrated   dorsal soft tissue swelling. Vascular calcifications.    --- End of Report ---    < end of copied text >     (02 Jun 2025 00:05)      Past Medical History and Surgical History  PAST MEDICAL & SURGICAL HISTORY:  S/P CABG (coronary artery bypass graft)  x4      Diabetes mellitus      Transient ischemic attack (TIA)  2017; 2008      Chronic kidney disease (CKD)  Stage IV      Hypertension      Stented coronary artery  in 2008      Myocardial infarction  2012      PAD (peripheral artery disease)  S/p bypass left leg      HLD (hyperlipidemia)      BPH (benign prostatic hyperplasia)      Pain in left knee  s/p fall      OA (osteoarthritis)      [place] (hard of hearing)      Chronic anemia      History of medical problems  left arm precaution      S/P CABG (coronary artery bypass graft)  2012      H/O arterial bypass of lower limb  Left Lower Extremity (2016)      History of surgery  Left CEA (2017)  Left Pinkie toe Amputation (2014)  CABG x 4 (2012)  Card cath - stent (2008)        AV fistula  2019  LEFT AV FISTULA           Review of Systems:  [X] Ten point review of systems is otherwise negative except as noted     Objective:  Vital Signs Last 24 Hrs  T(C): 36.6 (02 Jun 2025 04:43), Max: 36.9 (02 Jun 2025 00:34)  T(F): 97.8 (02 Jun 2025 04:43), Max: 98.5 (02 Jun 2025 00:34)  HR: 81 (02 Jun 2025 04:43) (72 - 82)  BP: 149/72 (02 Jun 2025 04:43) (124/72 - 151/75)  BP(mean): 97 (02 Jun 2025 04:43) (97 - 101)  RR: 18 (02 Jun 2025 04:43) (18 - 18)  SpO2: 90% (02 Jun 2025 04:43) (90% - 96%)    Parameters below as of 02 Jun 2025 01:23    O2 Flow (L/min): 3                          9.1    7.62  )-----------( 204      ( 02 Jun 2025 06:24 )             28.3                 06-02    136  |  92[L]  |  70[HH]  ----------------------------<  94  5.0   |  27  |  5.4[HH]    Ca    8.6      02 Jun 2025 06:24    TPro  6.8  /  Alb  3.5  /  TBili  0.2  /  DBili  x   /  AST  16  /  ALT  15  /  AlkPhos  169[H]  06-02        Physical Exam - Lower Extremity Focused:   Derm:  Superficial Pressure ulcers detected on plantar surface of left hindfoot and blister at distal tip of left hallux, minimal signs of erythema, no signs of active drainage no clinical signs of infection,  Vascular: DP and PT Pulses diminished; Foot is Warm to Warm to the touch; Capillary Refill Time < 3 Seconds;   Neuro: Protective Sensation Diminished / Moderately Neuropathic   MSK: L 5th toe amp. [place] deformity L foot     Assessment:  Superficial Pressure Ulcers, Left foot  Diabetic Foot with Charcot deformity chronic   Plan:  Chart reviewed and Patient evaluated. All Questions and Concerns Addressed and Answered  XR Imaging  Foot; Results reviewed- No clinical signs of Soft Tissue Infection  Weight Bearing Status; WBAT B/L Feet, recommend supportive boot to the L foot (patient states that he has the boot at home)   off loading B/L feet  Apply Allevyn pads to L foot   No Sx Debridement.   Patient is stable from Podiatry standpoint and could follow up in outpatient clinic.   Discussed Plan w/ Dr. Zarate.    Podiatry

## 2025-06-03 LAB
ALBUMIN SERPL ELPH-MCNC: 3.7 G/DL — SIGNIFICANT CHANGE UP (ref 3.5–5.2)
ALP SERPL-CCNC: 157 U/L — HIGH (ref 30–115)
ALT FLD-CCNC: 15 U/L — SIGNIFICANT CHANGE UP (ref 0–41)
ANION GAP SERPL CALC-SCNC: 15 MMOL/L — HIGH (ref 7–14)
AST SERPL-CCNC: 15 U/L — SIGNIFICANT CHANGE UP (ref 0–41)
BASOPHILS # BLD AUTO: 0.08 K/UL — SIGNIFICANT CHANGE UP (ref 0–0.2)
BASOPHILS NFR BLD AUTO: 1.1 % — HIGH (ref 0–1)
BILIRUB SERPL-MCNC: 0.3 MG/DL — SIGNIFICANT CHANGE UP (ref 0.2–1.2)
BUN SERPL-MCNC: 46 MG/DL — HIGH (ref 10–20)
CALCIUM SERPL-MCNC: 9.1 MG/DL — SIGNIFICANT CHANGE UP (ref 8.4–10.5)
CHLORIDE SERPL-SCNC: 96 MMOL/L — LOW (ref 98–110)
CO2 SERPL-SCNC: 28 MMOL/L — SIGNIFICANT CHANGE UP (ref 17–32)
CREAT SERPL-MCNC: 4.4 MG/DL — CRITICAL HIGH (ref 0.7–1.5)
EGFR: 14 ML/MIN/1.73M2 — LOW
EGFR: 14 ML/MIN/1.73M2 — LOW
EOSINOPHIL # BLD AUTO: 0.16 K/UL — SIGNIFICANT CHANGE UP (ref 0–0.7)
EOSINOPHIL NFR BLD AUTO: 2.1 % — SIGNIFICANT CHANGE UP (ref 0–8)
GLUCOSE BLDC GLUCOMTR-MCNC: 104 MG/DL — HIGH (ref 70–99)
GLUCOSE BLDC GLUCOMTR-MCNC: 113 MG/DL — HIGH (ref 70–99)
GLUCOSE BLDC GLUCOMTR-MCNC: 152 MG/DL — HIGH (ref 70–99)
GLUCOSE BLDC GLUCOMTR-MCNC: 88 MG/DL — SIGNIFICANT CHANGE UP (ref 70–99)
GLUCOSE SERPL-MCNC: 89 MG/DL — SIGNIFICANT CHANGE UP (ref 70–99)
HCT VFR BLD CALC: 29.5 % — LOW (ref 42–52)
HGB BLD-MCNC: 9.6 G/DL — LOW (ref 14–18)
IMM GRANULOCYTES NFR BLD AUTO: 0.4 % — HIGH (ref 0.1–0.3)
LYMPHOCYTES # BLD AUTO: 0.45 K/UL — LOW (ref 1.2–3.4)
LYMPHOCYTES # BLD AUTO: 6 % — LOW (ref 20.5–51.1)
MCHC RBC-ENTMCNC: 32.5 G/DL — SIGNIFICANT CHANGE UP (ref 32–37)
MCHC RBC-ENTMCNC: 34.2 PG — HIGH (ref 27–31)
MCV RBC AUTO: 105 FL — HIGH (ref 80–94)
MONOCYTES # BLD AUTO: 0.68 K/UL — HIGH (ref 0.1–0.6)
MONOCYTES NFR BLD AUTO: 9.1 % — SIGNIFICANT CHANGE UP (ref 1.7–9.3)
NEUTROPHILS # BLD AUTO: 6.06 K/UL — SIGNIFICANT CHANGE UP (ref 1.4–6.5)
NEUTROPHILS NFR BLD AUTO: 81.3 % — HIGH (ref 42.2–75.2)
NRBC BLD AUTO-RTO: 0 /100 WBCS — SIGNIFICANT CHANGE UP (ref 0–0)
PLATELET # BLD AUTO: 198 K/UL — SIGNIFICANT CHANGE UP (ref 130–400)
PMV BLD: 11.2 FL — HIGH (ref 7.4–10.4)
POTASSIUM SERPL-MCNC: 4.5 MMOL/L — SIGNIFICANT CHANGE UP (ref 3.5–5)
POTASSIUM SERPL-SCNC: 4.5 MMOL/L — SIGNIFICANT CHANGE UP (ref 3.5–5)
PROT SERPL-MCNC: 7.8 G/DL — SIGNIFICANT CHANGE UP (ref 6–8)
RBC # BLD: 2.81 M/UL — LOW (ref 4.7–6.1)
RBC # FLD: 13.8 % — SIGNIFICANT CHANGE UP (ref 11.5–14.5)
SODIUM SERPL-SCNC: 139 MMOL/L — SIGNIFICANT CHANGE UP (ref 135–146)
WBC # BLD: 7.46 K/UL — SIGNIFICANT CHANGE UP (ref 4.8–10.8)
WBC # FLD AUTO: 7.46 K/UL — SIGNIFICANT CHANGE UP (ref 4.8–10.8)

## 2025-06-03 PROCEDURE — 11056 PARNG/CUTG B9 HYPRKR LES 2-4: CPT

## 2025-06-03 PROCEDURE — 99232 SBSQ HOSP IP/OBS MODERATE 35: CPT

## 2025-06-03 PROCEDURE — 71045 X-RAY EXAM CHEST 1 VIEW: CPT | Mod: 26

## 2025-06-03 PROCEDURE — 99231 SBSQ HOSP IP/OBS SF/LOW 25: CPT | Mod: GC,25

## 2025-06-03 RX ADMIN — SEVELAMER HYDROCHLORIDE 1600 MILLIGRAM(S): 800 TABLET ORAL at 08:43

## 2025-06-03 RX ADMIN — Medication 1 APPLICATION(S): at 12:43

## 2025-06-03 RX ADMIN — Medication 81 MILLIGRAM(S): at 12:43

## 2025-06-03 RX ADMIN — HEPARIN SODIUM 5000 UNIT(S): 1000 INJECTION INTRAVENOUS; SUBCUTANEOUS at 18:06

## 2025-06-03 RX ADMIN — Medication 40 MILLIGRAM(S): at 05:41

## 2025-06-03 RX ADMIN — HEPARIN SODIUM 5000 UNIT(S): 1000 INJECTION INTRAVENOUS; SUBCUTANEOUS at 05:41

## 2025-06-03 RX ADMIN — INSULIN GLARGINE-YFGN 40 UNIT(S): 100 INJECTION, SOLUTION SUBCUTANEOUS at 22:04

## 2025-06-03 RX ADMIN — Medication 5 MILLIGRAM(S): at 18:06

## 2025-06-03 RX ADMIN — CLOPIDOGREL BISULFATE 75 MILLIGRAM(S): 75 TABLET, FILM COATED ORAL at 12:43

## 2025-06-03 RX ADMIN — Medication 1 APPLICATION(S): at 12:44

## 2025-06-03 RX ADMIN — FOLIC ACID 1 MILLIGRAM(S): 1 TABLET ORAL at 12:43

## 2025-06-03 RX ADMIN — SEVELAMER HYDROCHLORIDE 1600 MILLIGRAM(S): 800 TABLET ORAL at 12:43

## 2025-06-03 RX ADMIN — Medication 5 MILLIGRAM(S): at 05:41

## 2025-06-03 RX ADMIN — AMPICILLIN SODIUM AND SULBACTAM SODIUM 100 GRAM(S): 1; .5 INJECTION, POWDER, FOR SOLUTION INTRAMUSCULAR; INTRAVENOUS at 01:03

## 2025-06-03 RX ADMIN — Medication 30 MILLIGRAM(S): at 05:42

## 2025-06-03 RX ADMIN — METOPROLOL SUCCINATE 50 MILLIGRAM(S): 50 TABLET, EXTENDED RELEASE ORAL at 18:06

## 2025-06-03 RX ADMIN — Medication 1 DOSE(S): at 22:04

## 2025-06-03 RX ADMIN — CYANOCOBALAMIN 1000 MICROGRAM(S): 1000 INJECTION INTRAMUSCULAR; SUBCUTANEOUS at 12:43

## 2025-06-03 RX ADMIN — ATORVASTATIN CALCIUM 40 MILLIGRAM(S): 80 TABLET, FILM COATED ORAL at 22:04

## 2025-06-03 RX ADMIN — SEVELAMER HYDROCHLORIDE 1600 MILLIGRAM(S): 800 TABLET ORAL at 18:06

## 2025-06-03 RX ADMIN — Medication 1 DOSE(S): at 08:43

## 2025-06-03 RX ADMIN — FUROSEMIDE 80 MILLIGRAM(S): 10 INJECTION INTRAMUSCULAR; INTRAVENOUS at 05:42

## 2025-06-03 RX ADMIN — METOPROLOL SUCCINATE 50 MILLIGRAM(S): 50 TABLET, EXTENDED RELEASE ORAL at 05:41

## 2025-06-03 NOTE — DISCHARGE NOTE PROVIDER - PROVIDER TOKENS
PROVIDER:[TOKEN:[[id number]],FOLLOWUP:[1 week]] PROVIDER:[TOKEN:[[id number]],FOLLOWUP:[1 week]],PROVIDER:[TOKEN:[[id number]],FOLLOWUP:[1-3 days]] PROVIDER:[TOKEN:[[id number]],FOLLOWUP:[1 week]],PROVIDER:[TOKEN:[[id number]],FOLLOWUP:[1-3 days]],PROVIDER:[TOKEN:[[id number]],SCHEDULEDAPPT:[06/13/2025],SCHEDULEDAPPTTIME:[10:00 AM]]

## 2025-06-03 NOTE — DISCHARGE NOTE PROVIDER - NSDCFUADDAPPT_GEN_ALL_CORE_FT
APPTS ARE READY TO BE MADE: [ X ] YES    Best Family or Patient Contact (if needed):    Additional Information about above appointments (if needed):    1: A neurologist (neuropathy)  2:  3:     Other comments or requests:

## 2025-06-03 NOTE — DISCHARGE NOTE PROVIDER - NSDCCPCAREPLAN_GEN_ALL_CORE_FT
PRINCIPAL DISCHARGE DIAGNOSIS  Diagnosis: ESRD on dialysis  Assessment and Plan of Treatment: You were evaluated in the hospital for your L heel ulcer. Podiatry and vascular surgery evaluated you. Your arterial dopler showed stenosis in the arteries in your legs. Please follow with Dr Malik Vascular surgeon on Friday.  After discharge, you will need to:   - Follow up with your primary care doctor within 1-2 weeks  - Follow up with Podiatry, Vascular surgery  - Take all the medications you were discharged with, unless otherwise instructed by your healthcare provider(s).   Please follow up with your providers by calling them to make an appointment so that you can see them in 1-2weeks; bring your paperwork from this hospital stay to that visit. You can access your visit information by signing up for an account for the patient portal at [email or web address]   Seek immediate medical attention if you develop fevers, chills, chest pain, shortness of breath, nausea and vomiting, abdominal pain, passing out, weakness or numbness or tingling on one side of your body, or any other concerning signs or symptoms.        SECONDARY DISCHARGE DIAGNOSES  Diagnosis: Fluid overload  Assessment and Plan of Treatment:     Diagnosis: Hypoxia  Assessment and Plan of Treatment:     Diagnosis: Abrasion of left heel  Assessment and Plan of Treatment:      PRINCIPAL DISCHARGE DIAGNOSIS  Diagnosis: Pressure injury of skin of foot  Assessment and Plan of Treatment: You had a superficial ulcer of your L foot noted. You did not require antibiotics or any surgery. Your plan of care is local wound care. Please return if you develop fevers, drainage of pus, significant pain, bleeding, fevers, or other concerning signs/symptoms.  Please follow up with podiatrist and vascular surgeon outpatient.      SECONDARY DISCHARGE DIAGNOSES  Diagnosis: Fluid overload  Assessment and Plan of Treatment:

## 2025-06-03 NOTE — DIETITIAN INITIAL EVALUATION ADULT - PERSON TAUGHT/METHOD
Discussed importance of PO intake, benefits of nutrition supplement + discussed renal diet order/verbal instruction/patient instructed

## 2025-06-03 NOTE — DIETITIAN INITIAL EVALUATION ADULT - OTHER CALCULATIONS
WEIGHT USED: 86.1 kg (dosing wt 6/2)  ENERGY: 6812-1969 kcal/day (25-30 kcal/kg)  PROTEIN: 103-129 g/day (1.2-1.5 g/kg)  FLUID: 1 mL/kcal  -- Estimated needs with consideration for Age, Weight, BMI, ESRD on HD

## 2025-06-03 NOTE — DIETITIAN INITIAL EVALUATION ADULT - ORAL INTAKE PTA/DIET HISTORY
Patient reports decrease in appetite for a while; was unable to provide time frame but reports it has been > 6 months. Patient resides at assisted living facility and follows a renal diet, takes Nepro 2x/day. No dietary restrictions and no cultural/[place] preferences reported. NKFAs.  Reported UBW 86.3 kg vs CBW (dosing wt 6/2) 86.1 kg -- patient denies any recent significant weight changes or weight loss.   Weight Hx per EMR:  6/1/25- 86.1 kg  4/26/25- 83.9 kg  10/17/24- 76.7 kg  9/23/24- 93.4 kg   -- Unsure of accuracy of weight change given 18% weight loss in one month from Sept to oct and steady weight gain from october to present; will verify weight history as able.

## 2025-06-03 NOTE — DIETITIAN INITIAL EVALUATION ADULT - NSICDXPASTMEDICALHX_GEN_ALL_CORE_FT
PAST MEDICAL HISTORY:  BPH (benign prostatic hyperplasia)     Chronic anemia     Chronic kidney disease (CKD) Stage IV    Diabetes mellitus     History of medical problems left arm precaution    HLD (hyperlipidemia)     [place] (hard of hearing)     Hypertension     Myocardial infarction 2012    OA (osteoarthritis)     PAD (peripheral artery disease) S/p bypass left leg    Pain in left knee s/p fall    S/P CABG (coronary artery bypass graft) x4    Stented coronary artery in 2008    Transient ischemic attack (TIA) 2017; 2008

## 2025-06-03 NOTE — DISCHARGE NOTE PROVIDER - CARE PROVIDER_API CALL
Shannon Alicia  Internal Medicine  [address], [place] [address]  Phone: [phone]  Fax: [phone]  Follow Up Time: 1 week   Shannon Alicia  Internal Medicine  [address], [place] [address]  Phone: [phone]  Fax: [phone]  Follow Up Time: 1 week    Adams Augustin  Nephrology  [address] [place] [address]  Phone: [phone]  Fax: [phone]  Follow Up Time: 1-3 days   Shannon Alicia  Internal Medicine  [address] [facility], [place] [address]  Phone: [phone]  Fax: [phone]  Follow Up Time: 1 week    Adams Augustin  Nephrology  [address] [place] [address]  Phone: [phone]  Fax: [phone]  Follow Up Time: 1-3 days    John Malik  Vascular Surgery  [address] [place] [address], [address]  [place] [address]  Phone: [phone]  Fax: [phone]  Scheduled Appointment: 06/13/2025 10:00 AM

## 2025-06-03 NOTE — PROGRESS NOTE ADULT - SUBJECTIVE AND OBJECTIVE BOX
SUBJECTIVE/OVERNIGHT EVENTS  Today is hospital day 3d. This morning patient was seen and examined at bedside, resting comfortably in bed. No acute or major events overnight. Pt received HD yesterday and tolerated it well.    65-year-old male with a past medical history of end-stage renal disease on hemodialysis Monday Wednesday Friday via left arm AV fistula, diabetes, BPH, CAD status post CABG, hypertension, CHF, TIA, peripheral arterial disease, history of left toe ulcers status post amputations presents to the ED for evaluation abrasion to the left heel which she sustained about an hour ago which has now stopped bleeding.  Patient is on aspirin and Plavix.  Patient denies fever, chills, weakness, or recent trauma.        MEDICATIONS  STANDING MEDICATIONS  ampicillin/sulbactam  IVPB      ampicillin/sulbactam  IVPB 1.5 Gram(s) IV Intermittent every 24 hours  aspirin  chewable 81 milliGRAM(s) Oral daily  atorvastatin 40 milliGRAM(s) Oral at bedtime  bacitracin   Ointment 1 Application(s) Topical daily  chlorhexidine 2% Cloths 1 Application(s) Topical daily  clopidogrel Tablet 75 milliGRAM(s) Oral daily  cyanocobalamin 1000 MICROGram(s) Oral daily  dextrose 5%. 1000 milliLiter(s) IV Continuous <Continuous>  dextrose 5%. 1000 milliLiter(s) IV Continuous <Continuous>  dextrose 50% Injectable 25 Gram(s) IV Push once  dextrose 50% Injectable 12.5 Gram(s) IV Push once  dextrose 50% Injectable 25 Gram(s) IV Push once  fluticasone propionate/ salmeterol 250-50 MICROgram(s) Diskus 1 Dose(s) Inhalation two times a day  folic acid 1 milliGRAM(s) Oral daily  furosemide    Tablet 80 milliGRAM(s) Oral daily  glucagon  Injectable 1 milliGRAM(s) IntraMuscular once  heparin   Injectable 5000 Unit(s) SubCutaneous every 12 hours  insulin glargine Injectable (LANTUS) 40 Unit(s) SubCutaneous at bedtime  insulin lispro (ADMELOG) corrective regimen sliding scale   SubCutaneous three times a day before meals  metoclopramide 5 milliGRAM(s) Oral two times a day  metoprolol tartrate 50 milliGRAM(s) Oral two times a day  NIFEdipine XL 30 milliGRAM(s) Oral daily  pantoprazole    Tablet 40 milliGRAM(s) Oral before breakfast  senna 2 Tablet(s) Oral at bedtime  sevelamer carbonate 1600 milliGRAM(s) Oral three times a day with meals    PRN MEDICATIONS  acetaminophen     Tablet .. 650 milliGRAM(s) Oral every 6 hours PRN  aluminum hydroxide/magnesium hydroxide/simethicone Suspension 30 milliLiter(s) Oral every 4 hours PRN  dextrose Oral Gel 15 Gram(s) Oral once PRN  melatonin 3 milliGRAM(s) Oral at bedtime PRN  ondansetron Injectable 4 milliGRAM(s) IV Push every 8 hours PRN    VITALS  T(F): 97.7 (06-03-25 @ 04:38), Max: 98.7 (06-02-25 @ 20:35)  HR: 72 (06-03-25 @ 04:38) (72 - 82)  BP: 155/73 (06-03-25 @ 04:38) (135/64 - 155/73)  RR: 18 (06-03-25 @ 04:38) (18 - 18)  SpO2: 100% (06-03-25 @ 08:30) (96% - 100%)  POCT Blood Glucose.: 113 mg/dL (06-03-25 @ 11:44)  POCT Blood Glucose.: 88 mg/dL (06-03-25 @ 07:42)  POCT Blood Glucose.: 122 mg/dL (06-02-25 @ 21:13)  POCT Blood Glucose.: 100 mg/dL (06-02-25 @ 16:39)      PHYSICAL EXAM:  GENERAL: NAD, well-groomed, well-developed  HEAD:  Atraumatic, Normocephalic  EYES: EOMI, PERRLA, conjunctiva and sclera clear  ENMT:  Moist mucous membranes  NECK: Supple, No JVD,  CHEST/LUNG: Clear to auscultation bilaterally  HEART: Regular rate and rhythm  ABDOMEN: Soft, Nontender, Nondistended; Bowel sounds present  NEURO:  MENTAL STATUS: AAOx3  LANG/SPEECH: Fluent, intact naming, repetition & comprehension  CRANIAL NERVES:  II: Pupils equal and reactive, no RAPD, normal visual field and fundus  III, IV, VI: EOM intact, no gaze preference or deviation  V: normal  VII: no facial asymmetry  VIII: normal hearing to speech  MOTOR: 5/5 in both upper and lower extremities  REFLEXES: 2/4 throughout  SENSORY: Normal to touch  COORD: Normal finger to nose   Gait:   EXTREMITIES: No LE edema, no calf tenderness  LYMPH: No lymphadenopathy noted  SKIN: No rashes or lesions         LABS             9.6    7.46  )-----------( 198      ( 06-03-25 @ 06:01 )             29.5     139  |  96  |  46  -------------------------<  89   06-03-25 @ 06:01  4.5  |  28  |  4.4    Ca      9.1     06-03-25 @ 06:01    TPro  7.8  /  Alb  3.7  /  TBili  0.3  /  DBili  x   /  AST  15  /  ALT  15  /  AlkPhos  157  /  GGT  x     06-03-25 @ 06:01      Troponin T, High Sensitivity Result: 317 ng/L (06-01-25 @ 23:38)  Pro-Brain Natriuretic Peptide: 14993 pg/mL (06-01-25 @ 21:46)  Troponin T, High Sensitivity Result: 315 ng/L (06-01-25 @ 21:46)    Urinalysis Basic - ( 03 Jun 2025 06:01 )    Color: x / Appearance: x / SG: x / pH: x  Gluc: 89 mg/dL / Ketone: x  / Bili: x / Urobili: x   Blood: x / Protein: x / Nitrite: x   Leuk Esterase: x / RBC: x / WBC x   Sq Epi: x / Non Sq Epi: x / Bacteria: x          IMAGING  < from: VA Duplex Lower Extrem Arterial, Bilat (06.02.25 @ 16:34) >  The right posterior tibial artery is occluded. Moderate stenosis of the   right dorsalis pedis artery.    Left posterior tibial and peroneal arteries are occluded.    < end of copied text >    < from: Xray Chest 1 View- PORTABLE-Routine (Xray Chest 1 View- PORTABLE-Routine in AM.) (06.03.25 @ 05:45) >  Basilar opacities/effusions, left greater than right, unchanged.    < end of copied text >          ASSESSMENT AND PLAN:                                                             SUBJECTIVE/OVERNIGHT EVENTS  Today is hospital day 3d. This morning patient was seen and examined at bedside, resting comfortably in bed. No acute or major events overnight. Pt received HD yesterday and tolerated it well.        MEDICATIONS  STANDING MEDICATIONS  ampicillin/sulbactam  IVPB      ampicillin/sulbactam  IVPB 1.5 Gram(s) IV Intermittent every 24 hours  aspirin  chewable 81 milliGRAM(s) Oral daily  atorvastatin 40 milliGRAM(s) Oral at bedtime  bacitracin   Ointment 1 Application(s) Topical daily  chlorhexidine 2% Cloths 1 Application(s) Topical daily  clopidogrel Tablet 75 milliGRAM(s) Oral daily  cyanocobalamin 1000 MICROGram(s) Oral daily  dextrose 5%. 1000 milliLiter(s) IV Continuous <Continuous>  dextrose 5%. 1000 milliLiter(s) IV Continuous <Continuous>  dextrose 50% Injectable 25 Gram(s) IV Push once  dextrose 50% Injectable 12.5 Gram(s) IV Push once  dextrose 50% Injectable 25 Gram(s) IV Push once  fluticasone propionate/ salmeterol 250-50 MICROgram(s) Diskus 1 Dose(s) Inhalation two times a day  folic acid 1 milliGRAM(s) Oral daily  furosemide    Tablet 80 milliGRAM(s) Oral daily  glucagon  Injectable 1 milliGRAM(s) IntraMuscular once  heparin   Injectable 5000 Unit(s) SubCutaneous every 12 hours  insulin glargine Injectable (LANTUS) 40 Unit(s) SubCutaneous at bedtime  insulin lispro (ADMELOG) corrective regimen sliding scale   SubCutaneous three times a day before meals  metoclopramide 5 milliGRAM(s) Oral two times a day  metoprolol tartrate 50 milliGRAM(s) Oral two times a day  NIFEdipine XL 30 milliGRAM(s) Oral daily  pantoprazole    Tablet 40 milliGRAM(s) Oral before breakfast  senna 2 Tablet(s) Oral at bedtime  sevelamer carbonate 1600 milliGRAM(s) Oral three times a day with meals    PRN MEDICATIONS  acetaminophen     Tablet .. 650 milliGRAM(s) Oral every 6 hours PRN  aluminum hydroxide/magnesium hydroxide/simethicone Suspension 30 milliLiter(s) Oral every 4 hours PRN  dextrose Oral Gel 15 Gram(s) Oral once PRN  melatonin 3 milliGRAM(s) Oral at bedtime PRN  ondansetron Injectable 4 milliGRAM(s) IV Push every 8 hours PRN    VITALS  T(F): 97.7 (06-03-25 @ 04:38), Max: 98.7 (06-02-25 @ 20:35)  HR: 72 (06-03-25 @ 04:38) (72 - 82)  BP: 155/73 (06-03-25 @ 04:38) (135/64 - 155/73)  RR: 18 (06-03-25 @ 04:38) (18 - 18)  SpO2: 100% (06-03-25 @ 08:30) (96% - 100%)  POCT Blood Glucose.: 113 mg/dL (06-03-25 @ 11:44)  POCT Blood Glucose.: 88 mg/dL (06-03-25 @ 07:42)  POCT Blood Glucose.: 122 mg/dL (06-02-25 @ 21:13)  POCT Blood Glucose.: 100 mg/dL (06-02-25 @ 16:39)      PHYSICAL EXAM:  GENERAL: NAD, well-groomed, well-developed  HEAD:  Atraumatic, Normocephalic  EYES: EOMI, PERRLA, conjunctiva and sclera clear  ENMT:  Moist mucous membranes  NECK: Supple, No JVD,  CHEST/LUNG: Clear to auscultation bilaterally  HEART: Regular rate and rhythm  ABDOMEN: Soft, Nontender, Nondistended; Bowel sounds present  NEURO:  MENTAL STATUS: AAOx3  LANG/SPEECH: Fluent, intact naming, repetition & comprehension  CRANIAL NERVES:  II: Pupils equal and reactive, no RAPD, normal visual field and fundus  III, IV, VI: EOM intact, no gaze preference or deviation  V: normal  VII: no facial asymmetry  VIII: normal hearing to speech  MOTOR: 5/5 in both upper and lower extremities  REFLEXES: 2/4 throughout  SENSORY: Normal to touch  COORD: Normal finger to nose   Gait:   EXTREMITIES: No LE edema, no calf tenderness  LYMPH: No lymphadenopathy noted  SKIN: L heel ulcer         LABS             9.6    7.46  )-----------( 198      ( 06-03-25 @ 06:01 )             29.5     139  |  96  |  46  -------------------------<  89   06-03-25 @ 06:01  4.5  |  28  |  4.4    Ca      9.1     06-03-25 @ 06:01    TPro  7.8  /  Alb  3.7  /  TBili  0.3  /  DBili  x   /  AST  15  /  ALT  15  /  AlkPhos  157  /  GGT  x     06-03-25 @ 06:01      Troponin T, High Sensitivity Result: 317 ng/L (06-01-25 @ 23:38)  Pro-Brain Natriuretic Peptide: 64022 pg/mL (06-01-25 @ 21:46)  Troponin T, High Sensitivity Result: 315 ng/L (06-01-25 @ 21:46)    Urinalysis Basic - ( 03 Jun 2025 06:01 )    Color: x / Appearance: x / SG: x / pH: x  Gluc: 89 mg/dL / Ketone: x  / Bili: x / Urobili: x   Blood: x / Protein: x / Nitrite: x   Leuk Esterase: x / RBC: x / WBC x   Sq Epi: x / Non Sq Epi: x / Bacteria: x          IMAGING  < from: VA Duplex Lower Extrem Arterial, Bilat (06.02.25 @ 16:34) >  The right posterior tibial artery is occluded. Moderate stenosis of the   right dorsalis pedis artery.    Left posterior tibial and peroneal arteries are occluded.    < end of copied text >    < from: Xray Chest 1 View- PORTABLE-Routine (Xray Chest 1 View- PORTABLE-Routine in AM.) (06.03.25 @ 05:45) >  Basilar opacities/effusions, left greater than right, unchanged.    < end of copied text >          ASSESSMENT AND PLAN:

## 2025-06-03 NOTE — DISCHARGE NOTE PROVIDER - NSDCFUADDINST_GEN_ALL_CORE_FT
Wound Care Dressings: Q24h- Bacitracin w/ Allevyne Pad to Left Heel and Bacitracin w/ Bandaid to Left Hallux  Weight Bearing Status; WBAT B/L Feet, recommend supportive boot to the L foot (patient states that he has the boot at home)   off loading B/L feet Wound Care Dressings: Q24h- Bacitracin w/ Allevyne Pad to Left Heel and Bacitracin w/ Bandaid to Left Hallux  Weight Bearing Status; WBAT B/L Feet, recommend supportive boot to the L foot (patient states that he has the boot at home)   off loading B/L feet  Mr. Schwabacher has an appointment on Friday 6/13 at 10 AM with Dr. Malik, vascular surgery, [address] [place] [address], 3rd floor

## 2025-06-03 NOTE — CONSULT NOTE ADULT - SUBJECTIVE AND OBJECTIVE BOX
VASCULAR SURGERY CONSULT NOTE      HPI:  65-year-old male with a past medical history of end-stage renal disease on hemodialysis Monday Wednesday Friday via left arm AV fistula, diabetes, BPH, CAD status post CABG, hypertension, CHF, TIA, peripheral arterial disease, Left CEA 2017, left below knee pop to AT vein bypass 2017, multiple angiograms with plasties bilateral lower extremities, last baloon plasty of left leg bypass in 2022, history of left toe ulcers status post amputations presents to the ED for evaluation abrasion to the left heel which she sustained about an hour ago which has now stopped bleeding.  Patient is on aspirin and Plavix.  Patient denies fever, chills, weakness, or recent trauma.    sp lokelma in the ED    Troponin T, High Sensitivity Result: 315:     < from: Xray Foot AP + Lateral + Oblique, Left (06.01.25 @ 22:02) >  FINDINGS /  IMPRESSION:    Grossly unchanged appearance. Status post left fifth transmetatarsal   amputation. Stable severe Charcot arthropathy with loss of normal plantar   arch. Severe midfoot and forefootdegenerative changes. Redemonstrated   dorsal soft tissue swelling. Vascular calcifications.    --- End of Report ---    < end of copied text >     (02 Jun 2025 00:05)        PAST MEDICAL & SURGICAL HISTORY:  S/P CABG (coronary artery bypass graft)  x4      Diabetes mellitus      Transient ischemic attack (TIA)  2017; 2008      Chronic kidney disease (CKD)  Stage IV      Hypertension      Stented coronary artery  in 2008      Myocardial infarction  2012      PAD (peripheral artery disease)  S/p bypass left leg      HLD (hyperlipidemia)      BPH (benign prostatic hyperplasia)      Pain in left knee  s/p fall      OA (osteoarthritis)      [place] (hard of hearing)      Chronic anemia      History of medical problems  left arm precaution      S/P CABG (coronary artery bypass graft)  2012      H/O arterial bypass of lower limb  Left Lower Extremity (2016)      History of surgery  Left CEA (2017)  Left Pinkie toe Amputation (2014)  CABG x 4 (2012)  Card cath - stent (2008)        AV fistula  2019  LEFT AV FISTULA        No Known Allergies    Home Medications:  aspirin 81 mg oral tablet: 1 tab(s) orally once a day (08 May 2024 09:32)  HumaLOG 100 units/mL injectable solution: 5 international unit(s) injectable 2 times a day (with meals) (08 May 2024 09:32)  Lasix 80 mg oral tablet: 1 tab(s) orally once a day (02 Jun 2025 00:56)  metoclopramide 5 mg oral tablet: 1 tab(s) orally 2 times a day (02 Jun 2025 00:23)  metoprolol tartrate 50 mg oral tablet: 1 tab(s) orally 2 times a day (02 Jun 2025 01:00)  NIFEdipine: 30 milligram(s) orally once a day (02 Jun 2025 00:27)  ocular lubricant ophthalmic solution: 3 drop(s) to each affected eye every 2 hours, As needed, Dry Eyes (08 May 2024 09:32)  pantoprazole 40 mg oral delayed release tablet: 1 tab(s) orally once a day (08 May 2024 09:16)  Plavix 75 mg oral tablet: 1 tab(s) orally once a day (08 May 2024 09:32)  senna (sennosides) 8.6 mg oral tablet: 2 tab(s) orally once a day (at bedtime) (23 Sep 2024 17:27)  tamsulosin 0.4 mg oral capsule: 1 cap(s) orally once a day (at bedtime) (08 May 2024 09:32)  Tylenol 325 mg oral tablet: 2 tab(s) orally every 6 hours, As Needed (08 May 2024 09:32)  Zocor 40 mg oral tablet: 1 tab(s) orally once a day (02 Jun 2025 00:56)    No permtinent family history of PVD    REVIEW OF SYSTEMS:  GENERAL:                                         negative  SKIN:                                                 see HPI  OPTHALMOLOGIC:                          negative  ENMT:                                               negative  RESPIRATORY AND THORAX:        negative  CARDIOVASCULAR:                         see HPI  GASTROINTESTINAL:                       negative  NEPHROLOGY:                                  negative  MUSCULOSKELETAL:                       negative  NEUROLOGIC:                                   negative  PSYCHIATRIC:                                    negative  HEMATOLOGY/LYMPHATICS:         negative  ENDOCRINE:                                    see HPI  ALLERGIC/IMMUNOLOGIC:            negative    12 point ROS otherwise normal except as stated in HPI  FHx: none  SHX:  [ ]  smoking     [ ] alcohol use    Vital Signs Last 24 Hrs  T(C): 36.3 (03 Jun 2025 12:23), Max: 37.1 (02 Jun 2025 20:35)  T(F): 97.3 (03 Jun 2025 12:23), Max: 98.7 (02 Jun 2025 20:35)  HR: 72 (03 Jun 2025 12:23) (72 - 82)  BP: 130/73 (03 Jun 2025 12:23) (130/73 - 155/73)  BP(mean): 100 (03 Jun 2025 04:38) (100 - 100)  RR: 18 (03 Jun 2025 12:23) (18 - 18)  SpO2: 100% (03 Jun 2025 08:30) (96% - 100%)    Parameters below as of 03 Jun 2025 08:30  Patient On (Oxygen Delivery Method): nasal cannula  O2 Flow (L/min): 3        PHYSICAL EXAM  Appearance: Normal	  HEENT:   Normal oral mucosa, PERRL, EOMI	  Neck: Supple, - JVD;  Cardiovascular: Normal S1 S2, No JVD, No murmurs,   Respiratory: Lungs clear to auscultation, No Rales, Rhonchi, Wheezing	  Gastrointestinal:  Soft, Non-tender, positive BS	  Skin: No rashes, No ecchymoses, No cyanosis  Extremities: Normal range of motion, No clubbing, cyanosis or edema  Left lower extremity: superficial pressure ulcers detected on plantar surface of left hindfoot and blister at distal tip of left hallux  Neurologic: Non-focal  Psychiatry: A & O x 3, Mood & affect appropriate      PULSES:  Femoral:  Popliteal:  Dorsal Pedal: dopplerable b/l  Posterior Tibial: dopplerable b/l  Capillary:    MEDICATIONS:   MEDICATIONS  (STANDING):  ampicillin/sulbactam  IVPB      ampicillin/sulbactam  IVPB 1.5 Gram(s) IV Intermittent every 24 hours  aspirin  chewable 81 milliGRAM(s) Oral daily  atorvastatin 40 milliGRAM(s) Oral at bedtime  bacitracin   Ointment 1 Application(s) Topical daily  chlorhexidine 2% Cloths 1 Application(s) Topical daily  clopidogrel Tablet 75 milliGRAM(s) Oral daily  cyanocobalamin 1000 MICROGram(s) Oral daily  dextrose 5%. 1000 milliLiter(s) (100 mL/Hr) IV Continuous <Continuous>  dextrose 5%. 1000 milliLiter(s) (50 mL/Hr) IV Continuous <Continuous>  dextrose 50% Injectable 25 Gram(s) IV Push once  dextrose 50% Injectable 12.5 Gram(s) IV Push once  dextrose 50% Injectable 25 Gram(s) IV Push once  fluticasone propionate/ salmeterol 250-50 MICROgram(s) Diskus 1 Dose(s) Inhalation two times a day  folic acid 1 milliGRAM(s) Oral daily  furosemide    Tablet 80 milliGRAM(s) Oral daily  glucagon  Injectable 1 milliGRAM(s) IntraMuscular once  heparin   Injectable 5000 Unit(s) SubCutaneous every 12 hours  insulin glargine Injectable (LANTUS) 40 Unit(s) SubCutaneous at bedtime  insulin lispro (ADMELOG) corrective regimen sliding scale   SubCutaneous three times a day before meals  metoclopramide 5 milliGRAM(s) Oral two times a day  metoprolol tartrate 50 milliGRAM(s) Oral two times a day  NIFEdipine XL 30 milliGRAM(s) Oral daily  pantoprazole    Tablet 40 milliGRAM(s) Oral before breakfast  senna 2 Tablet(s) Oral at bedtime  sevelamer carbonate 1600 milliGRAM(s) Oral three times a day with meals    MEDICATIONS  (PRN):  acetaminophen     Tablet .. 650 milliGRAM(s) Oral every 6 hours PRN Temp greater or equal to 38C (100.4F), Mild Pain (1 - 3)  aluminum hydroxide/magnesium hydroxide/simethicone Suspension 30 milliLiter(s) Oral every 4 hours PRN Dyspepsia  dextrose Oral Gel 15 Gram(s) Oral once PRN Blood Glucose LESS THAN 70 milliGRAM(s)/deciliter  melatonin 3 milliGRAM(s) Oral at bedtime PRN Insomnia  ondansetron Injectable 4 milliGRAM(s) IV Push every 8 hours PRN Nausea and/or Vomiting      LAB/STUDIES:                        9.6    7.46  )-----------( 198      ( 03 Jun 2025 06:01 )             29.5     06-03    139  |  96[L]  |  46[H]  ----------------------------<  89  4.5   |  28  |  4.4[HH]    Ca    9.1      03 Jun 2025 06:01    TPro  7.8  /  Alb  3.7  /  TBili  0.3  /  DBili  x   /  AST  15  /  ALT  15  /  AlkPhos  157[H]  06-03      LIVER FUNCTIONS - ( 03 Jun 2025 06:01 )  Alb: 3.7 g/dL / Pro: 7.8 g/dL / ALK PHOS: 157 U/L / ALT: 15 U/L / AST: 15 U/L / GGT: x             Urinalysis Basic - ( 03 Jun 2025 06:01 )    Color: x / Appearance: x / SG: x / pH: x  Gluc: 89 mg/dL / Ketone: x  / Bili: x / Urobili: x   Blood: x / Protein: x / Nitrite: x   Leuk Esterase: x / RBC: x / WBC x   Sq Epi: x / Non Sq Epi: x / Bacteria: x        IMAGING:      VA Duplex Lower Extrem Arterial, Bilat (06.02.25 @ 16:34) >    The right posterior tibial artery is occluded. Moderate stenosis of the   right dorsalis pedis artery.    Left posterior tibial and peroneal arteries are occluded.

## 2025-06-03 NOTE — DISCHARGE NOTE PROVIDER - NSDCMRMEDTOKEN_GEN_ALL_CORE_FT
aspirin 81 mg oral tablet: 1 tab(s) orally once a day  cyanocobalamin 1000 mcg oral tablet: 1 tab(s) orally once a day  folic acid 1 mg oral tablet: 1 tab(s) orally once a day  HumaLOG 100 units/mL injectable solution: 5 international unit(s) injectable 2 times a day (with meals)  Lasix 80 mg oral tablet: 1 tab(s) orally once a day  metoclopramide 5 mg oral tablet: 1 tab(s) orally 2 times a day  metoprolol tartrate 50 mg oral tablet: 1 tab(s) orally 2 times a day  NIFEdipine: 30 milligram(s) orally once a day  ocular lubricant ophthalmic solution: 3 drop(s) to each affected eye every 2 hours, As needed, Dry Eyes  pantoprazole 40 mg oral delayed release tablet: 1 tab(s) orally once a day  Plavix 75 mg oral tablet: 1 tab(s) orally once a day  senna (sennosides) 8.6 mg oral tablet: 2 tab(s) orally once a day (at bedtime)  tamsulosin 0.4 mg oral capsule: 1 cap(s) orally once a day (at bedtime)  Tylenol 325 mg oral tablet: 2 tab(s) orally every 6 hours, As Needed  Zocor 40 mg oral tablet: 1 tab(s) orally once a day   alcohol swabs: Apply topically to affected area 4 times a day  aspirin 81 mg oral tablet: 1 tab(s) orally once a day  cyanocobalamin 1000 mcg oral tablet: 1 tab(s) orally once a day  folic acid 1 mg oral tablet: 1 tab(s) orally once a day  glucometer (per patient&#x27;s insurance): Test blood sugars four times a day. Dispense #1 glucometer.  lancets: 1 application subcutaneously 4 times a day  Lantus Solostar Pen 100 units/mL subcutaneous solution: 4 unit(s) subcutaneous once a day (at bedtime)  metoclopramide 5 mg oral tablet: 1 tab(s) orally 2 times a day  metoprolol tartrate 50 mg oral tablet: 1 tab(s) orally 2 times a day  NIFEdipine 30 mg oral tablet, extended release: 1 tab(s) orally once a day  ocular lubricant ophthalmic solution: 3 drop(s) to each affected eye every 2 hours, As needed, Dry Eyes  pantoprazole 40 mg oral delayed release tablet: 1 tab(s) orally once a day (before a meal)  Plavix 75 mg oral tablet: 1 tab(s) orally once a day  senna (sennosides) 8.6 mg oral tablet: 2 tab(s) orally once a day (at bedtime)  sevelamer carbonate 800 mg oral tablet: 2 tab(s) orally 3 times a day (with meals)  test strips (per patient&#x27;s insurance): 1 application subcutaneously 4 times a day. ** Compatible with patient&#x27;s glucometer **  Tylenol 325 mg oral tablet: 2 tab(s) orally every 6 hours, As Needed  Zocor 40 mg oral tablet: 1 tab(s) orally once a day   alcohol swabs: Apply topically to affected area 4 times a day  aspirin 81 mg oral tablet: 1 tab(s) orally once a day  cyanocobalamin 1000 mcg oral tablet: 1 tab(s) orally once a day  folic acid 1 mg oral tablet: 1 tab(s) orally once a day  glucometer (per patient&#x27;s insurance): Test blood sugars four times a day. Dispense #1 glucometer.  lancets: 1 application subcutaneously 4 times a day  Lantus Solostar Pen 100 units/mL subcutaneous solution: 4 unit(s) subcutaneous once a day (at bedtime)  metoclopramide 5 mg oral tablet: 1 tab(s) orally 2 times a day  metoprolol tartrate 50 mg oral tablet: 1 tab(s) orally 2 times a day  NIFEdipine 30 mg oral tablet, extended release: 1 tab(s) orally once a day  ocular lubricant ophthalmic solution: 3 drop(s) to each affected eye every 2 hours, As needed, Dry Eyes  pantoprazole 40 mg oral delayed release tablet: 1 tab(s) orally once a day (before a meal)  Plavix 75 mg oral tablet: 1 tab(s) orally once a day  polyethylene glycol 3350 oral powder for reconstitution: 17 gram(s) orally every 12 hours HOLD if having more than 2 BMs per day  senna (sennosides) 8.6 mg oral tablet: 2 tab(s) orally once a day (at bedtime)  sevelamer carbonate 800 mg oral tablet: 2 tab(s) orally 3 times a day (with meals)  test strips (per patient&#x27;s insurance): 1 application subcutaneously 4 times a day. ** Compatible with patient&#x27;s glucometer **  Tylenol 325 mg oral tablet: 2 tab(s) orally every 6 hours, As Needed  Zocor 40 mg oral tablet: 1 tab(s) orally once a day   aspirin 81 mg oral tablet: 1 tab(s) orally once a day  cyanocobalamin 1000 mcg oral tablet: 1 tab(s) orally once a day  Flonase 50 mcg/inh nasal spray: 1 spray(s) in each nostril once a day  folic acid 1 mg oral tablet: 1 tab(s) orally once a day  Lantus Solostar Pen 100 units/mL subcutaneous solution: 4 unit(s) subcutaneous once a day (at bedtime)  Lipitor 40 mg oral tablet: 1 tab(s) orally once a day (at bedtime)  metoclopramide 5 mg oral tablet: 1 tab(s) orally 2 times a day  metoprolol succinate 50 mg oral tablet, extended release: 1 tab(s) orally once a day  NIFEdipine 30 mg oral tablet, extended release: 1 tab(s) orally once a day  Plavix 75 mg oral tablet: 1 tab(s) orally once a day  polyethylene glycol 3350 oral powder for reconstitution: 17 gram(s) orally every 12 hours HOLD if having more than 2 BMs per day  senna (sennosides) 8.6 mg oral tablet: 2 tab(s) orally once a day (at bedtime)  sevelamer carbonate 800 mg oral tablet: 2 tab(s) orally 3 times a day (with meals)  tamsulosin 0.4 mg oral capsule: 1 cap(s) orally once a day (in the evening)  traZODone 50 mg oral tablet: 1 tab(s) orally once a day (at bedtime)  Tylenol 325 mg oral tablet: 2 tab(s) orally every 6 hours, As Needed  Zanaflex 2 mg oral capsule: 1 cap(s) orally once a day

## 2025-06-03 NOTE — CONSULT NOTE ADULT - ASSESSMENT
ASSESSMENT  65-year-old male with a past medical history of end-stage renal disease on hemodialysis Monday Wednesday Friday via left arm AV fistula, diabetes, BPH, CAD status post CABG, hypertension, CHF, TIA, peripheral arterial disease, history of left toe ulcers status post amputations presents to the ED for evaluation abrasion to the left heel which she sustained about an hour ago    IMPRESSION  #Left Heel Ulcer/Abraision  - Xray Foot AP + Lateral + Oblique, Left (06.01.25 @ 22:02): FINDINGS / IMPRESSION: Grossly unchanged appearance. Status post left fifth transmetatarsal  amputation. Stable severe Charcot arthropathy with loss of normal plantar  arch. Severe midfoot and forefootdegenerative changes. Redemonstrated  dorsal soft tissue swelling. Vascular calcifications.    #ESRD  #CAD s/p CABG  #CHF    #Abx allergy: No Known Allergies    RECOMMENDATIONS  - area of dried blood/eschar on left heel without evidence of erythema/drainage   - low suspicion for cellulitis, would stop antibiotics     Please call or message on [employer] Teams if with any questions.  Spectra [phone]
65-year-old male with a past medical history of end-stage renal disease on hemodialysis Monday Wednesday Friday via left arm AV fistula, diabetes, BPH, CAD status post CABG, hypertension, CHF, TIA, peripheral arterial disease, Left CEA 2017, left below knee pop to AT vein bypass 2017, multiple angiograms with plasties bilateral lower extremities, last baloon plasty of left leg bypass in 2022, history of left toe ulcers status post amputations presents to the ED for evaluation abrasion to the left heel which she sustained about an hour ago which has now stopped bleeding.  Patient is on aspirin and Plavix.  Patient denies fever, chills, weakness, or recent trauma.    Vascular surgery called to evaluate after pt presented with left heel pressure wound and hallux blister.   Art dplx reviewed. Pt has loud dopplerable signals suggesting bypass is likely open    Pt also is seeing Dr. Malik this Friday 6/6 at 10 AM    Plan:  - I reviewed today's labs  - I reviewed and personally visualized all the radiology imagings  - I discussed the plan with attending Dr. Malik:  - no need for any interventions at this time  - pt should keep his appointment on Friday with Dr. Malik (since last seen in the office was in 2023)  - continue ASA and statin    Any questions, call [phone]
ESRD on HD MWF at [facility]  L heel ulcer  DM  BPH  CAD status post CABG  Hypertension  TIA  Peripheral arterial disease    Plan:    HD today: 3 hours, opti 160 dialyzer, 2K bath, 3.5L UF  Renally dose antibiotics  Sevelamer with meals  Renal diet

## 2025-06-03 NOTE — DIETITIAN INITIAL EVALUATION ADULT - NSFNSPHYEXAMSKINFT_GEN_A_CORE
L heel abrasion, L diabetic foot ulcer per flowsheets; no pressure injuries documented; Deangelo Score 16

## 2025-06-03 NOTE — DIETITIAN INITIAL EVALUATION ADULT - PERTINENT LABORATORY DATA
`[facility]  Admission Note     Admission Type:   Admission Type: Voluntary    Reason for admission:  Reason for Admission: Depression with Suicidal thoughts    PATIENT STRENGTHS:  Strengths: Positive Support, Social Skills, Medication Compliance    Patient Strengths and Limitations:  Limitations: Lacks leisure interests, Tendency to isolate self    Addictive Behavior:   Addictive Behavior  In the past 3 months, have you felt or has someone told you that you have a problem with:  : None  Do you have a history of Chemical Use?: No  Do you have a history of Alcohol Use?: Yes(Last use months ago)  Do you have a history of Street Drug Abuse?: Yes(Last use May 2nd used crack)  Histroy of Prescripton Drug Abuse?: No    Medical Problems:   Past Medical History:   Diagnosis Date    Anxiety d    Cancer ([facility] [address]     Depression     Hypertension     PTSD (post-traumatic stress disorder)     Thyroid disease        Status EXAM:  Status and Exam  Normal: No  Facial Expression: Flat  Affect: Blunt  Level of Consciousness: Alert  Mood:Normal: No  Mood: Depressed, Anxious  Motor Activity:Normal: No  Motor Activity: Repetitive Acts  Interview Behavior: Cooperative  Preception: [place] to Person, [place] Daily to Time, [place] to Place, [place] to Situation  Attention:Normal: No  Attention: Distractible  Thought Processes: Circumstantial  Thought Content:Normal: No  Thought Content: Preoccupations  Hallucinations: None  Delusions: No  Memory:Normal: No  Memory: Poor Recent  Insight and Judgment: Yes  Present Suicidal Ideation: Yes(Fleeting suicidal thoughts, no plan)  Present Homicidal Ideation: No    Tobacco Screening:  Practical Counseling, on admission, cheikh X, if applicable and completed (first 3 are required if patient doesn't refuse):            ( )  Recognizing danger situations (included triggers and roadblocks)                    ( )  Coping skills (new ways to manage stress, exercise, relaxation techniques, changing routine, distraction)                                                           ( )  Basic information about quitting (benefits of quitting, techniques in how to quit, available resources  ( ) Referral for counseling faxed to [facility]                                           ( ) Patient refused counseling  (x ) Patient has not smoked in the last 30 days    Metabolic Screening:    Lab Results   Component Value Date    LABA1C 5.0 04/04/2019       No results for input(s): CHOL, TRIG, HDL, LDLCALC, LABVLDL in the last 72 hours. Body mass index is 37.68 kg/m². BP Readings from Last 2 Encounters:   05/22/19 (!) 132/91   04/16/19 123/76           Pt admitted with followings belongings:  Dentures: None  Vision - Corrective Lenses: Glasses  Hearing Aid: None  Jewelry: None  Body Piercings Removed: N/A  Valuables placed in safe in security envelope, number:  [id number]. Patient's home medications were verified. Patient oriented to surroundings and program expectations and copy of patient rights given. Received admission packet:  yes. Consents reviewed, signed yes. Refused nothing. Patient verbalize understanding:  yes.     Patient education on precautions: yes                   Suad Kraus RN 06-03    139  |  96[L]  |  46[H]  ----------------------------<  89  4.5   |  28  |  4.4[HH]    Ca    9.1      03 Jun 2025 06:01    TPro  7.8  /  Alb  3.7  /  TBili  0.3  /  DBili  x   /  AST  15  /  ALT  15  /  AlkPhos  157[H]  06-03  POCT Blood Glucose.: 113 mg/dL (06-03-25 @ 11:44)  A1C with Estimated Average Glucose Result: 5.2 % (06-02-25 @ 06:24)  A1C with Estimated Average Glucose Result: 6.6 % (09-24-24 @ 06:56)

## 2025-06-03 NOTE — CONSULT NOTE ADULT - SUBJECTIVE AND OBJECTIVE BOX
SCHWABACHER, LAWRENCE  65y, Male  Allergy: No Known Allergies      CHIEF COMPLAINT:    LOS  1d    HPI:  65-year-old male with a past medical history of end-stage renal disease on hemodialysis Monday Wednesday Friday via left arm AV fistula, diabetes, BPH, CAD status post CABG, hypertension, CHF, TIA, peripheral arterial disease, history of left toe ulcers status post amputations presents to the ED for evaluation abrasion to the left heel which she sustained about an hour ago which has now stopped bleeding.  Patient is on aspirin and Plavix.  Patient denies fever, chills, weakness, or recent trauma.    sp lokelma in the ED    Troponin T, High Sensitivity Result: 315:    < from: Xray Foot AP + Lateral + Oblique, Left (06.01.25 @ 22:02) >  FINDINGS /  IMPRESSION:    Grossly unchanged appearance. Status post left fifth transmetatarsal  amputation. Stable severe Charcot arthropathy with loss of normal plantar  arch. Severe midfoot and forefootdegenerative changes. Redemonstrated  dorsal soft tissue swelling. Vascular calcifications.    --- End of Report ---    < end of copied text >     (02 Jun 2025 00:05)      INFECTIOUS DISEASE HISTORY:  History as above.    PAST MEDICAL & SURGICAL HISTORY:  S/P CABG (coronary artery bypass graft)  x4      Diabetes mellitus      Transient ischemic attack (TIA)  2017; 2008      Chronic kidney disease (CKD)  Stage IV      Hypertension      Stented coronary artery  in 2008      Myocardial infarction  2012      PAD (peripheral artery disease)  S/p bypass left leg      HLD (hyperlipidemia)      BPH (benign prostatic hyperplasia)      Pain in left knee  s/p fall      OA (osteoarthritis)      [place] (hard of hearing)      Chronic anemia      History of medical problems  left arm precaution      S/P CABG (coronary artery bypass graft)  2012      H/O arterial bypass of lower limb  Left Lower Extremity (2016)      History of surgery  Left CEA (2017)  Left Pinkie toe Amputation (2014)  CABG x 4 (2012)  Card cath - stent (2008)        AV fistula  2019  LEFT AV FISTULA          FAMILY HISTORY  Family history of heart disease (Father)    DM (diabetes mellitus) (Mother)    ESRD (end stage renal disease) on dialysis (Mother)        SOCIAL HISTORY  Social History:  quit smoking > 20 years ago  no alcohol misuse  lives at NH  ambulates with walker (14 Nov 2022 08:13)        ROS  General: Denies rigors, nightsweats  HEENT: Denies headache, rhinorrhea, sore throat, eye pain  CV: Denies CP, palpitations  PULM: Denies wheezing, hemoptysis  GI: Denies hematemesis, hematochezia, melena  : Denies discharge, hematuria  MSK: Denies arthralgias, myalgias  SKIN: Denies rash, lesions  NEURO: Denies paresthesias, weakness  PSYCH: Denies depression, anxiety    VITALS:  T(F): 97.7, Max: 98.7 (06-02-25 @ 20:35)  HR: 72  BP: 155/73  RR: 18Vital Signs Last 24 Hrs  T(C): 36.5 (03 Jun 2025 04:38), Max: 37.1 (02 Jun 2025 20:35)  T(F): 97.7 (03 Jun 2025 04:38), Max: 98.7 (02 Jun 2025 20:35)  HR: 72 (03 Jun 2025 04:38) (64 - 82)  BP: 155/73 (03 Jun 2025 04:38) (135/64 - 155/73)  BP(mean): 100 (03 Jun 2025 04:38) (100 - 100)  RR: 18 (03 Jun 2025 04:38) (17 - 18)  SpO2: 98% (03 Jun 2025 04:38) (96% - 98%)    Parameters below as of 02 Jun 2025 15:00  Patient On (Oxygen Delivery Method): nasal cannula  O2 Flow (L/min): 3      PHYSICAL EXAM:  Gen: NAD, resting in bed  HEENT: Normocephalic, atraumatic  Neck: supple, no lymphadenopathy  CV: Regular rate & regular rhythm  Lungs: decreased BS at bases, no fremitus  Abdomen: Soft, BS present  Ext: Warm, well perfused  Neuro: non focal, awake  Skin: left heel with area of dried blood on platar aspect without surrounding erythema   Lines: no phlebitis    TESTS & MEASUREMENTS:                        9.6    7.46  )-----------( 198      ( 03 Jun 2025 06:01 )             29.5    06-02    136  |  92[L]  |  70[HH]  ----------------------------<  94  5.0   |  27  |  5.4[HH]    Ca    8.6      02 Jun 2025 06:24    TPro  6.8  /  Alb  3.5  /  TBili  0.2  /  DBili  x   /  AST  16  /  ALT  15  /  AlkPhos  169[H]  06-02      LIVER FUNCTIONS - ( 02 Jun 2025 06:24 )  Alb: 3.5 g/dL / Pro: 6.8 g/dL / ALK PHOS: 169 U/L / ALT: 15 U/L / AST: 16 U/L / GGT: x          Urinalysis Basic - ( 02 Jun 2025 06:24 )    Color: x / Appearance: x / SG: x / pH: x  Gluc: 94 mg/dL / Ketone: x  / Bili: x / Urobili: x  Blood: x / Protein: x / Nitrite: x  Leuk Esterase: x / RBC: x / WBC x  Sq Epi: x / Non Sq Epi: x / Bacteria: x        Culture - Blood (collected 05-27-25 @ 16:02)  Source: Blood Blood-Peripheral  Final Report (06-01-25 @ 23:00):    No growth at 5 days    Culture - Blood (collected 05-27-25 @ 16:02)  Source: Blood Blood-Peripheral  Final Report (06-01-25 @ 23:00):    No growth at 5 days    Culture - Blood (collected 05-14-25 @ 16:32)  Source: Blood Blood  Final Report (05-19-25 @ 23:00):    No growth at 5 days    Culture - Blood (collected 05-14-25 @ 16:32)  Source: Blood Blood  Final Report (05-19-25 @ 23:00):    No growth at 5 days    Culture - Body Fluid with Gram Stain (collected 09-25-24 @ 14:18)  Source: Ascites Fl Ascites Fluid  Gram Stain (09-26-24 @ 01:58):    polymorphonuclear leukocytes seen    No organisms seen    by cytocentrifuge  Final Report (09-30-24 @ 16:42):    No growth at 5 days    Culture - Fungal, Body Fluid (collected 09-25-24 @ 14:18)  Source: Peritoneal Peritoneal Fluid  Final Report (10-23-24 @ 23:00):    No fungus isolated at 4 weeks.        Blood Gas Venous - Lactate: 1.7 mmol/L (06-01-25 @ 22:21)      INFECTIOUS DISEASES TESTING  MRSA PCR Result.: Positive (06-02-25 @ 11:15)  MRSA PCR Result.: Negative (09-28-24 @ 15:15)  MRSA PCR Result.: Negative (09-24-24 @ 10:05)      RADIOLOGY & ADDITIONAL TESTS:  I have personally reviewed the last Chest xray  CXR  Xray Chest 1 View- PORTABLE-Urgent:  ACC: [id number] EXAM:  XR CHEST PORTABLE URGENT 1V   ORDERED BY: JUDITH PIMENTEL    PROCEDURE DATE:  06/01/2025          INTERPRETATION:  CLINICAL HISTORY / REASON FOR EXAM: Shortness of breath.    COMPARISON: Chest radiograph from September 30, 2024.    TECHNIQUE/POSITIONING: The patient is rotated. Single image, AP chest  radiograph.    FINDINGS:    SUPPORT DEVICES: None.    CARDIAC/MEDIASTINUM/HILUM: Post sternotomy.    LUNG PARENCHYMA/PLEURA: Bilateral opacities.    SKELETON/SOFT TISSUES: Unremarkable.      IMPRESSION:    Bilateral opacities.    --- End of Report ---            COURTNEY RANKIN MD; Attending Radiologist  This document has been electronically signed. Jun 1 2025 10:16PM (06-01-25 @ 22:01)      CT      CARDIOLOGY TESTING  12 Lead ECG:  Ventricular Rate 69 BPM    Atrial Rate 69 BPM    P-R Interval 218 ms    QRS Duration 96 ms    Q-T Interval 464 ms    QTC Calculation(Bazett) 497 ms    P Axis 49 degrees    R Axis 115 degrees    T Axis 148 degrees    Diagnosis Line Sinus rhythm [id number] degree A-V block    Abnormal ECG    Confirmed by Leif Benitez ([id number]) on 6/1/2025 11:45:57 PM (06-01-25 @ 21:55)      MEDICATIONS  ampicillin/sulbactam  IVPB    ampicillin/sulbactam  IVPB 1.5 IV Intermittent every 24 hours  aspirin  chewable 81 Oral daily  atorvastatin 40 Oral at bedtime  bacitracin   Ointment 1 Topical daily  chlorhexidine 2% Cloths 1 Topical daily  clopidogrel Tablet 75 Oral daily  cyanocobalamin 1000 Oral daily  dextrose 5%. 1000 IV Continuous <Continuous>  dextrose 5%. 1000 IV Continuous <Continuous>  dextrose 50% Injectable 25 IV Push once  dextrose 50% Injectable 12.5 IV Push once  dextrose 50% Injectable 25 IV Push once  fluticasone propionate/ salmeterol 250-50 MICROgram(s) Diskus 1 Inhalation two times a day  folic acid 1 Oral daily  furosemide    Tablet 80 Oral daily  glucagon  Injectable 1 IntraMuscular once  heparin   Injectable 5000 SubCutaneous every 12 hours  insulin glargine Injectable (LANTUS) 40 SubCutaneous at bedtime  insulin lispro (ADMELOG) corrective regimen sliding scale  SubCutaneous three times a day before meals  metoclopramide 5 Oral two times a day  metoprolol tartrate 50 Oral two times a day  NIFEdipine XL 30 Oral daily  pantoprazole    Tablet 40 Oral before breakfast  senna 2 Oral at bedtime  sevelamer carbonate 1600 Oral three times a day with meals      Weight  Weight (kg): 86.1 (06-02-25 @ 01:23)    ANTIBIOTICS:  ampicillin/sulbactam  IVPB      ampicillin/sulbactam  IVPB 1.5 Gram(s) IV Intermittent every 24 hours      ALLERGIES:  No Known Allergies

## 2025-06-03 NOTE — PROGRESS NOTE ADULT - ASSESSMENT
#Heel Ulcer  - Uynasn 1.5mg q24 hrs  -Vascular consult- PENDING  -ID consult-PENDING  - WBAT  - wound dressing and care  - left arterial duplex showed The right posterior tibial artery is occluded. Moderate stenosis of the   right dorsalis pedis artery.    Left posterior tibial and peroneal arteries are occluded.    #ESRD  - MWF  -   - Renagel 1600 tid  - trend chemistry and correct as needed  - sp lokelma in the ED   - Nifedpine 30mg  - Lasix     #CAD sp CABG  #PAD  - arterial duplex for non healing ulcer  - ASA PLAVIX  - home simvestatin swithced to atorvastatn  - LL VAS art     #DM2  - Lantus 40 home dose cont.  - ISS  - on home 5,5,5 lispro  - BS    #DVT: Heparin  #GI: pantoprazole  #L Pressure Superficial Heel Ulcer  - Arterial Duplex: left arterial duplex showed The right posterior tibial artery is occluded. Moderate stenosis of the   right dorsalis pedis artery. Left posterior tibial and peroneal arteries are occluded.  -Labs: wbc   PLAN  -Vascular consult- PENDING  -ID consult-monitor off abx  -Podiatry: no surgical intervention  -WBAT  -Wound dressing and care:  Q24h- Bacitracin w/ Allevyne Pad to Left Heel and Bacitracin w/ Bandaid to Left Hallux  - D/c Unyasn 1.5mg q24 hrs    #ESRD  - MWF  - Renagel 1600 tid  - trend chemistry and correct as needed  - sp lokelma in the ED   - Nifedpine 30mg  - Lasix     #CAD sp CABG  #PAD  - arterial duplex for non healing ulcer  - C/w ASA PLAVIX  - home simvestatin swithced to atorvastatn  - LL VAS art     #DM2  - Lantus 40 home dose cont.  - ISS  - on home 5,5,5 lispro      #DVT: Heparin  #GI: pantoprazole   Pending: Vascular consult 89-year-old male with a history of A-fib on coumadin,  HTN, macular degeneration , dx with COVID 10days ago, presents with weakness, cough, fatigue, dehydration, decreased p.o. intake, decreased urine output.  leucocytosis,  + COVID, CXR- b/l infiltrates    #Pneumonia  +COVID  zosyn    #BERNADETTE- likely prerenal- dehydration+post renal  gentle ivf  check UA, ulytes  renal cs    #urinary retention   consulted- /w Dr Robbins for palomino placement  monitor for bleeding    #supratherapeutic INR  hold coumadin  sp vitamin K  monitor for active bleeding and need for kcentra  monitor cbc    #HTN- bp optimal  hold home meds for now    #DVT ppx- supratherapeutic INR    [facility]   [phone]

## 2025-06-03 NOTE — PROGRESS NOTE ADULT - ASSESSMENT
ESRD on HD MWF at [facility]  L heel ulcer  DM  BPH  CAD status post CABG  Hypertension  TIA  Peripheral arterial disease    Plan:    HD tomorrow: 3 hours, opti 160 dialyzer, 2K bath, 3.5L UF  Renally dose antibiotics  Sevelamer with meals  Renal diet  Fluid restriction  Wean off o2

## 2025-06-03 NOTE — DIETITIAN INITIAL EVALUATION ADULT - OTHER INFO
65-year-old male with a past medical history of end-stage renal disease on hemodialysis Monday Wednesday Friday via left arm AV fistula, diabetes, BPH, CAD status post CABG, hypertension, CHF, TIA, peripheral arterial disease, history of left toe ulcers status post amputations presents to the ED for evaluation abrasion to the left heel which she sustained about an hour ago which has now stopped bleeding.

## 2025-06-03 NOTE — DISCHARGE NOTE PROVIDER - NSDCFUSCHEDAPPT_GEN_ALL_CORE_FT
John Malik  [facility] Physician [facility]  VASCULAR [address]  Scheduled Appointment: 06/04/2025    [facility] PreAdmits  Scheduled Appointment: 06/24/2025    [facility] Physician [facility]  [address]  Scheduled Appointment: 06/24/2025    Summer Mcneil  [facility]  GASTRO Doc Off [address]  Scheduled Appointment: 08/19/2025     John Malik  [facility] Physician [facility]  VASCULAR [address]  Scheduled Appointment: 06/13/2025    [facility] PreAdmits  Scheduled Appointment: 06/24/2025    [facility] Physician [facility]  [address]  Scheduled Appointment: 06/24/2025    Summer Mcneil  [facility]  GASTRO Doc Off [address]  Scheduled Appointment: 08/19/2025     [facility] PreAdmits  Scheduled Appointment: 06/24/2025    [facility] Physician [facility]  [address]  [address]  Scheduled Appointment: 06/24/2025    Summer Mcneil  [facility]  GASTRO Doc Off [address]  Scheduled Appointment: 08/19/2025

## 2025-06-03 NOTE — DIETITIAN INITIAL EVALUATION ADULT - NS FNS DIET ORDER
Diet, DASH/TLC:   Sodium & Cholesterol Restricted  For patients receiving Renal Replacement - No Protein Restr, No Conc K, No Conc Phos, Low  Sodium (RENAL) (06-02-25 @ 01:28) [Active]

## 2025-06-03 NOTE — PROGRESS NOTE ADULT - SUBJECTIVE AND OBJECTIVE BOX
[place] [facility] FOLLOW UP NOTE  --------------------------------------------------------------------------------  24 hour events/subjective: Patient examined. Appears comfortable.    PAST HISTORY  --------------------------------------------------------------------------------  No significant changes to PMH, PSH, FHx, SHx, unless otherwise noted    ALLERGIES & MEDICATIONS  --------------------------------------------------------------------------------  Allergies    No Known Allergies    Standing Inpatient Medications  ampicillin/sulbactam  IVPB      ampicillin/sulbactam  IVPB 1.5 Gram(s) IV Intermittent every 24 hours  aspirin  chewable 81 milliGRAM(s) Oral daily  atorvastatin 40 milliGRAM(s) Oral at bedtime  bacitracin   Ointment 1 Application(s) Topical daily  chlorhexidine 2% Cloths 1 Application(s) Topical daily  clopidogrel Tablet 75 milliGRAM(s) Oral daily  cyanocobalamin 1000 MICROGram(s) Oral daily  dextrose 5%. 1000 milliLiter(s) IV Continuous <Continuous>  dextrose 5%. 1000 milliLiter(s) IV Continuous <Continuous>  dextrose 50% Injectable 25 Gram(s) IV Push once  dextrose 50% Injectable 12.5 Gram(s) IV Push once  dextrose 50% Injectable 25 Gram(s) IV Push once  fluticasone propionate/ salmeterol 250-50 MICROgram(s) Diskus 1 Dose(s) Inhalation two times a day  folic acid 1 milliGRAM(s) Oral daily  furosemide    Tablet 80 milliGRAM(s) Oral daily  glucagon  Injectable 1 milliGRAM(s) IntraMuscular once  heparin   Injectable 5000 Unit(s) SubCutaneous every 12 hours  insulin glargine Injectable (LANTUS) 40 Unit(s) SubCutaneous at bedtime  insulin lispro (ADMELOG) corrective regimen sliding scale   SubCutaneous three times a day before meals  metoclopramide 5 milliGRAM(s) Oral two times a day  metoprolol tartrate 50 milliGRAM(s) Oral two times a day  NIFEdipine XL 30 milliGRAM(s) Oral daily  pantoprazole    Tablet 40 milliGRAM(s) Oral before breakfast  senna 2 Tablet(s) Oral at bedtime  sevelamer carbonate 1600 milliGRAM(s) Oral three times a day with meals    PRN Inpatient Medications  acetaminophen     Tablet .. 650 milliGRAM(s) Oral every 6 hours PRN  aluminum hydroxide/magnesium hydroxide/simethicone Suspension 30 milliLiter(s) Oral every 4 hours PRN  dextrose Oral Gel 15 Gram(s) Oral once PRN  melatonin 3 milliGRAM(s) Oral at bedtime PRN  ondansetron Injectable 4 milliGRAM(s) IV Push every 8 hours PRN    VITALS/PHYSICAL EXAM  --------------------------------------------------------------------------------  T(C): 36.3 (06-03-25 @ 12:23), Max: 37.1 (06-02-25 @ 20:35)  HR: 72 (06-03-25 @ 12:23) (72 - 81)  BP: 130/73 (06-03-25 @ 12:23) (130/73 - 155/73)  RR: 18 (06-03-25 @ 12:23) (18 - 18)  SpO2: 100% (06-03-25 @ 08:30) (98% - 100%)  Height (cm): 180.3 (06-02-25 @ 01:23)  Weight (kg): 86.1 (06-02-25 @ 01:23)  BMI (kg/m2): 26.5 (06-02-25 @ 01:23)  BSA (m2): 2.06 (06-02-25 @ 01:23)    06-02-25 @ 07:01  -  06-03-25 @ 07:00  --------------------------------------------------------  IN: 356 mL / OUT: 3500 mL / NET: -3144 mL    06-03-25 @ 07:01  -  06-03-25 @ 15:50  --------------------------------------------------------  IN: 480 mL / OUT: 0 mL / NET: 480 mL    Physical Exam:  	Gen: NAD  	Pulm: CTA B/L  	CV: RRR, S1S2  	Abd: +BS, soft, nontender/nondistended  	: No suprapubic tenderness  	LE: Warm,  edema  	Vascular access: AVF    LABS/STUDIES  --------------------------------------------------------------------------------              9.6    7.46  >-----------<  198      [06-03-25 @ 06:01]              29.5     139  |  96  |  46  ----------------------------<  89      [06-03-25 @ 06:01]  4.5   |  28  |  4.4        Ca     9.1     [06-03-25 @ 06:01]    TPro  7.8  /  Alb  3.7  /  TBili  0.3  /  DBili  x   /  AST  15  /  ALT  15  /  AlkPhos  157  [06-03-25 @ 06:01]    Creatinine Trend:  SCr 4.4 [06-03 @ 06:01]  SCr 5.4 [06-02 @ 06:24]  SCr 5.2 [06-01 @ 21:46]  SCr 5.1 [05-27 @ 16:09]  SCr 3.7 [05-14 @ 16:25]    Urinalysis - [06-03-25 @ 06:01]      Color  / Appearance  / SG  / pH       Gluc 89 / Ketone   / Bili  / Urobili        Blood  / Protein  / Leuk Est  / Nitrite       RBC  / WBC  / Hyaline  / Gran  / Sq Epi  / Non Sq Epi  / Bacteria     Iron 93, TIBC --, %sat --      [10-01-24 @ 07:00]  Ferritin 1129      [10-01-24 @ 07:00]  PTH -- (Ca 7.8)      [09-24-24 @ 06:56]   135  Vitamin D (25OH) 15      [09-25-24 @ 06:49]  Lipid: chol 81, TG 52, HDL 43, LDL --      [06-02-25 @ 06:24]

## 2025-06-03 NOTE — DISCHARGE NOTE PROVIDER - NSDCCAREPROVSEEN_GEN_ALL_CORE_FT
Internal Medicine Internal Medicine, infectious diseases, podiatry, nephrology Internal Medicine, infectious diseases, podiatry, nephrology, vascular

## 2025-06-03 NOTE — DISCHARGE NOTE PROVIDER - CARE PROVIDERS DIRECT ADDRESSES
,[email or web address],DirectAddress_Unknown ,[email or web address],DirectAddress_Unknown,[email or web address]

## 2025-06-03 NOTE — DISCHARGE NOTE PROVIDER - HOSPITAL COURSE
65-year-old male with a past medical history of end-stage renal disease on hemodialysis Monday Wednesday Friday via left arm AV fistula, diabetes, BPH, CAD status post CABG, hypertension, CHF, TIA, peripheral arterial disease, history of left toe ulcers status post amputations presents to the ED for evaluation abrasion to the left heel which she sustained about an hour ago which has now stopped bleeding.  Patient is on aspirin and Plavix.  Patient denies fever, chills, weakness, or recent trauma.    sp lokelma in the ED    Troponin T, High Sensitivity Result: 315:     < from: Xray Foot AP + Lateral + Oblique, Left (06.01.25 @ 22:02) >  FINDINGS /  IMPRESSION:    Grossly unchanged appearance. Status post left fifth transmetatarsal   amputation. Stable severe Charcot arthropathy with loss of normal plantar   arch. Severe midfoot and forefootdegenerative changes. Redemonstrated   dorsal soft tissue swelling. Vascular calcifications.    #L Pressure Superficial Heel Ulcer  - Arterial Duplex: left arterial duplex showed The right posterior tibial artery is occluded. Moderate stenosis of the   right dorsalis pedis artery. Left posterior tibial and peroneal arteries are occluded.  -Labs: wbc   PLAN  -Vascular consult- PENDING  -ID consult-monitor off abx  -Podiatry: no surgical intervention  -WBAT  -Wound dressing and care:  Q24h- Bacitracin w/ Allevyne Pad to Left Heel and Bacitracin w/ Bandaid to Left Hallux  - D/c Unyasn 1.5mg q24 hrs    #ESRD  - MWF  - Renagel 1600 tid  - trend chemistry and correct as needed  - sp lokelma in the ED   - Nifedpine 30mg  - Lasix     #CAD sp CABG  #PAD  - arterial duplex for non healing ulcer  - C/w ASA PLAVIX  - home simvestatin swithced to atorvastatn  - LL VAS art     #DM2  - Lantus 40 home dose cont.  - ISS  - on home 5,5,5 lispro      #DVT: Heparin  #GI: pantoprazole   Pending: Vascular consult     65-year-old male with a past medical history of end-stage renal disease on hemodialysis Monday Wednesday Friday via left arm AV fistula, diabetes, BPH, CAD status post CABG, hypertension, CHF, TIA, peripheral arterial disease, history of left toe ulcers status post amputations presents to the ED for evaluation abrasion to the left heel which she sustained about an hour ago which has now stopped bleeding.  Patient is on aspirin and Plavix.  Patient denies fever, chills, weakness, or recent trauma.    #T2DM with hypoglycemia  - Fingersticks are occasionally on lower end, patient noted to be eating.  - Insulin being titrated down, now on 4U Lantus.  - QAC QHS monitoring and adjust outpatient as needed.    #ESRD on HD  #Volume overload: improving, likely due to above  - Improved with HD here, plan for d/c on Monday  - Minimal urine output - stopped Lasix.  - Started on sevelamer 1600 TID per nephro (Rx sent).    #Left Superficial Heel Ulcer  #Diabetic foot ulcer with Charcot Deformity  #PAD   -Foot x ray showed Stable severe Charcot arthropathy with loss of normal plantar arch. Severe midfoot and forefoot degenerative changes  -Arterial duplex showed The right posterior tibial artery is occluded. Moderate stenosis of the right dorsalis pedis artery. Left posterior tibial and peroneal arteries are occluded.  -Seen by podiatry and Infectious disease, wound care, no need for antibiotics, WBAT.   -Follow up with Vascular outpatient.     #CAD sp CABG  -Continue ASA, Plavix Lipitor.     #DVT: Heparin  #GI: pantoprazole     Discharge planned for 6/9/25. #T2DM with hypoglycemia  - Fingersticks are occasionally on lower end, patient noted to be eating.  - Insulin being titrated down, now on 4U Lantus. --> continue and monitor.    #ESRD on HD  #Volume overload: improving, likely due to above  - Improved with HD here  - Minimal urine output - stopped Lasix.  - Started on sevelamer 1600 TID per nephro.    #Left Superficial Heel Ulcer  #Diabetic foot ulcer with Charcot Deformity  #PAD   -Foot x ray showed Stable severe Charcot arthropathy with loss of normal plantar arch. Severe midfoot and forefoot degenerative changes  -Arterial duplex showed The right posterior tibial artery is occluded. Moderate stenosis of the right dorsalis pedis artery. Left posterior tibial and peroneal arteries are occluded.  -Seen by podiatry and Infectious disease, wound care, no need for antibiotics, WBAT.   -Follow up with Vascular outpatient (has an appointment on Friday 6/13 at 10 AM with Dr. Malik, vascular surgery [address] [place] [address], 3rd floor).    #Ambulatory dysfunction, improved  - Patient reports that previously patient was brooke to ambulate with walker and intermittently used wheelchair.  - PT eval 6/9 noted that he was max assist and unable to ambulate (or even stand safely).  - Likely deconditioning versus advancing neuropathy.  - Re-eval by PT on 6/12 patient was able to amb. Recommend DC to retirement with home PT.  - Outpatient neurology for further eval.    #Constipation  - Patient reported 4 days of no BM on 6/11.  - On Senna, added Miralax BID on 6/10  - had 2 BMs s/p Dulcolax 5mg x 1 and PO lactulose 20g x 3 on 6/11.    #CAD sp CABG  -Continue ASA, Plavix Lipitor. 65-year-old male with a past medical history of end-stage renal disease on hemodialysis Monday Wednesday Friday via left arm AV fistula, diabetes, BPH, CAD status post CABG, hypertension, CHF, TIA, peripheral arterial disease, history of left toe ulcers status post amputations presents to the ED for evaluation abrasion to the left heel which she sustained about an hour prior which had now stopped bleeding. Patient is on aspirin and Plavix. Patient denied fever, chills, weakness, or recent trauma.    sp Lizzkelma in the ED    Troponin T, High Sensitivity Result: 315:     < from: Xray Foot AP + Lateral + Oblique, Left (06.01.25 @ 22:02) >  FINDINGS /  IMPRESSION:    Grossly unchanged appearance. Status post left fifth transmetatarsal amputation. Stable severe Charcot arthropathy with loss of normal plantar arch. Severe midfoot and forefoot degenerative changes. Redemonstrated dorsal soft tissue swelling. Vascular calcifications.    Podiatry assessed his wound:    Superficial Pressure Ulcers, Left foot - improving, stable, no sings of infection    Diabetic Foot with Charcot deformity chronic - stable     Wound Care Dressings: Q24h- Bacitracin w/ Allevyne Pad to Left Heel and Bacitracin w/ Bandaid to Left Hallux  Weight Bearing Status; WBAT B/L Feet, recommend supportive boot to the L foot (patient states that he has the boot at home)   off loading B/L feet  Apply Allevyn pads to L foot   F/u as outpatient    Patient had b/l arterial duplex done: The right posterior tibial artery is occluded. Moderate stenosis of the right dorsalis pedis artery. Left posterior tibial and peroneal arteries are occluded.    Vascular was consulted, no intervention was recommended. Patient has an appointment on Friday 6/13 at 10 AM with Dr. Malik, vascular surgery [address] [place] [address], 3rd floor.    Patient's insulin regimen was adjusted based on his fingerstick readings.    Patient had 2 BMs on 6/11 after receiving laxatives.

## 2025-06-03 NOTE — DIETITIAN INITIAL EVALUATION ADULT - ADD RECOMMEND
Nutrition Intervention: Meals and Snacks, Medical Food Supplements, Coordination of Care  RD to monitor PO/nutrition supplement intake, GI function, Nutrition Labs, Electrolytes, NFPF, Weights    RD to follow at moderate nutrition risk.

## 2025-06-03 NOTE — PROGRESS NOTE ADULT - SUBJECTIVE AND OBJECTIVE BOX
Podiatry Progress Note    Subjective:  SCHWABACHER, LAWRENCE is a  65y Male.   Seen bedside.     Patient is a 65y old  Male who presents with a chief complaint of wound of left foot. Podiatry was consulted for wound of left foot.     Past Medical History and Surgical History  PAST MEDICAL & SURGICAL HISTORY:  S/P CABG (coronary artery bypass graft)  x4      Diabetes mellitus      Transient ischemic attack (TIA)  2017; 2008      Chronic kidney disease (CKD)  Stage IV      Hypertension      Stented coronary artery  in 2008      Myocardial infarction  2012      PAD (peripheral artery disease)  S/p bypass left leg      HLD (hyperlipidemia)      BPH (benign prostatic hyperplasia)      Pain in left knee  s/p fall      OA (osteoarthritis)      [place] (hard of hearing)      Chronic anemia      History of medical problems  left arm precaution      S/P CABG (coronary artery bypass graft)  2012      H/O arterial bypass of lower limb  Left Lower Extremity (2016)      History of surgery  Left CEA (2017)  Left Pinkie toe Amputation (2014)  CABG x 4 (2012)  Card cath - stent (2008)        AV fistula  2019  LEFT AV FISTULA           Objective:  Vital Signs Last 24 Hrs  T(C): 36.5 (03 Jun 2025 04:38), Max: 37.1 (02 Jun 2025 20:35)  T(F): 97.7 (03 Jun 2025 04:38), Max: 98.7 (02 Jun 2025 20:35)  HR: 72 (03 Jun 2025 04:38) (64 - 82)  BP: 155/73 (03 Jun 2025 04:38) (135/64 - 155/73)  BP(mean): 100 (03 Jun 2025 04:38) (100 - 100)  RR: 18 (03 Jun 2025 04:38) (17 - 18)  SpO2: 100% (03 Jun 2025 08:30) (96% - 100%)    Parameters below as of 03 Jun 2025 08:30  Patient On (Oxygen Delivery Method): nasal cannula  O2 Flow (L/min): 3                          9.6    7.46  )-----------( 198      ( 03 Jun 2025 06:01 )             29.5                 06-03    139  |  96[L]  |  46[H]  ----------------------------<  89  4.5   |  28  |  4.4[HH]    Ca    9.1      03 Jun 2025 06:01    TPro  7.8  /  Alb  3.7  /  TBili  0.3  /  DBili  x   /  AST  15  /  ALT  15  /  AlkPhos  157[H]  06-03        Physical Exam - Lower Extremity Focused:   Derm:  Pressure ulcers detected on plantar surface of left hindfoot and at distal tip of left hallux, minimal signs of erythema, no signs of active drainage or purulence, no clinical signs of infection,  Vascular: DP and PT Pulses Intact; Foot is Warm to Warm to the touch; Capillary Refill Time < 3 Seconds; Pitting Edema +2 at medial ankle of LF  Neuro: Protective Sensation Diminished / Moderately Neuropathic   MSK: No Pain On Palpation at Wound Site     Assessment:  Pressure Ulcers, Left foot    Plan:  Chart reviewed and Patient evaluated. All Questions and Concerns Addressed and Answered  XR Imaging  Foot; Results reviewed- No clinical signs of Soft Tissue Infection  Local Wound Care; Wound dressed w/ Xerofrom/ Gauze / DSD / Kerlix / Ace bandage.  Wound Care Dressings: Q24h- Bacitracin w/ Allevyne Pad to Left Heel and Bacitracin w/ Bandaid to Left Hallux  Weight Bearing Status; WBAT   No Sx Debridement.   Patient is stable from Podiatry standpoint and could follow up in outpatient clinic w/ Dr. Zarate.  Patient seen bedside w/ Attending Dr. Zarate.    Podiatry        Podiatry Progress Note    Subjective:  SCHWABACHER, LAWRENCE is a  65y Male.   Seen bedside.     Patient is a 65y old  Male who presents with a chief complaint of wound of left foot. Podiatry was consulted for wound of left foot.     Past Medical History and Surgical History  PAST MEDICAL & SURGICAL HISTORY:  S/P CABG (coronary artery bypass graft)  x4      Diabetes mellitus      Transient ischemic attack (TIA)  2017; 2008      Chronic kidney disease (CKD)  Stage IV      Hypertension      Stented coronary artery  in 2008      Myocardial infarction  2012      PAD (peripheral artery disease)  S/p bypass left leg      HLD (hyperlipidemia)      BPH (benign prostatic hyperplasia)      Pain in left knee  s/p fall      OA (osteoarthritis)      [place] (hard of hearing)      Chronic anemia      History of medical problems  left arm precaution      S/P CABG (coronary artery bypass graft)  2012      H/O arterial bypass of lower limb  Left Lower Extremity (2016)      History of surgery  Left CEA (2017)  Left Pinkie toe Amputation (2014)  CABG x 4 (2012)  Card cath - stent (2008)        AV fistula  2019  LEFT AV FISTULA           Objective:  Vital Signs Last 24 Hrs  T(C): 36.5 (03 Jun 2025 04:38), Max: 37.1 (02 Jun 2025 20:35)  T(F): 97.7 (03 Jun 2025 04:38), Max: 98.7 (02 Jun 2025 20:35)  HR: 72 (03 Jun 2025 04:38) (64 - 82)  BP: 155/73 (03 Jun 2025 04:38) (135/64 - 155/73)  BP(mean): 100 (03 Jun 2025 04:38) (100 - 100)  RR: 18 (03 Jun 2025 04:38) (17 - 18)  SpO2: 100% (03 Jun 2025 08:30) (96% - 100%)    Parameters below as of 03 Jun 2025 08:30  Patient On (Oxygen Delivery Method): nasal cannula  O2 Flow (L/min): 3                          9.6    7.46  )-----------( 198      ( 03 Jun 2025 06:01 )             29.5                 06-03    139  |  96[L]  |  46[H]  ----------------------------<  89  4.5   |  28  |  4.4[HH]    Ca    9.1      03 Jun 2025 06:01    TPro  7.8  /  Alb  3.7  /  TBili  0.3  /  DBili  x   /  AST  15  /  ALT  15  /  AlkPhos  157[H]  06-03          Physical Exam - Lower Extremity Focused:   Derm:  Superficial Pressure ulcers detected on plantar surface of left hindfoot and blister at distal tip of left hallux, minimal signs of erythema, no signs of active drainage no clinical signs of infection  Calluses x 3 B/L feet   Vascular: DP and PT Pulses diminished; Foot is Warm to Warm to the touch; Capillary Refill Time < 3 Seconds;   Neuro: Protective Sensation Diminished / Moderately Neuropathic   MSK: L 5th toe amp. [place]er[place] deformity L foot     Assessment:  Superficial Pressure Ulcers, Left foot  Diabetic Foot with Charcot deformity chronic     Plan:  Chart reviewed and Patient evaluated. All Questions and Concerns Addressed and Answered  XR Imaging  Foot; Results reviewed- No clinical signs of Soft Tissue Infection  Wound Care Dressings: Q24h- Bacitracin w/ Allevyne Pad to Left Heel and Bacitracin w/ Bandaid to Left Hallux  Weight Bearing Status; WBAT B/L Feet, recommend supportive boot to the L foot (patient states that he has the boot at home)   off loading B/L feet  Apply Allevyn pads to L foot   No Sx Debridement.   Patient is stable from Podiatry standpoint and could follow up in outpatient clinic.   Discussed Plan w/ Dr. Zarate.  Patient seen bedside w/ Attending Dr. Zarate.    Podiatry

## 2025-06-03 NOTE — PHARMACOTHERAPY INTERVENTION NOTE - COMMENTS
Consistent with ID note, recommended to discontinue ampicillin/sulbactam order.    Jeanette Tapia, PharmD, BCIDP  Clinical Pharmacy Specialist, Infectious Diseases  Tele-Antimicrobial Stewardship Program (Tele-ASP)  Available on [employer] Teams

## 2025-06-03 NOTE — DIETITIAN INITIAL EVALUATION ADULT - PERTINENT MEDS FT
MEDICATIONS  (STANDING):  ampicillin/sulbactam  IVPB      ampicillin/sulbactam  IVPB 1.5 Gram(s) IV Intermittent every 24 hours  aspirin  chewable 81 milliGRAM(s) Oral daily  atorvastatin 40 milliGRAM(s) Oral at bedtime  bacitracin   Ointment 1 Application(s) Topical daily  chlorhexidine 2% Cloths 1 Application(s) Topical daily  clopidogrel Tablet 75 milliGRAM(s) Oral daily  cyanocobalamin 1000 MICROGram(s) Oral daily  dextrose 5%. 1000 milliLiter(s) (100 mL/Hr) IV Continuous <Continuous>  dextrose 5%. 1000 milliLiter(s) (50 mL/Hr) IV Continuous <Continuous>  dextrose 50% Injectable 25 Gram(s) IV Push once  dextrose 50% Injectable 12.5 Gram(s) IV Push once  dextrose 50% Injectable 25 Gram(s) IV Push once  fluticasone propionate/ salmeterol 250-50 MICROgram(s) Diskus 1 Dose(s) Inhalation two times a day  folic acid 1 milliGRAM(s) Oral daily  furosemide    Tablet 80 milliGRAM(s) Oral daily  glucagon  Injectable 1 milliGRAM(s) IntraMuscular once  heparin   Injectable 5000 Unit(s) SubCutaneous every 12 hours  insulin glargine Injectable (LANTUS) 40 Unit(s) SubCutaneous at bedtime  insulin lispro (ADMELOG) corrective regimen sliding scale   SubCutaneous three times a day before meals  metoclopramide 5 milliGRAM(s) Oral two times a day  metoprolol tartrate 50 milliGRAM(s) Oral two times a day  NIFEdipine XL 30 milliGRAM(s) Oral daily  pantoprazole    Tablet 40 milliGRAM(s) Oral before breakfast  senna 2 Tablet(s) Oral at bedtime  sevelamer carbonate 1600 milliGRAM(s) Oral three times a day with meals    MEDICATIONS  (PRN):  acetaminophen     Tablet .. 650 milliGRAM(s) Oral every 6 hours PRN Temp greater or equal to 38C (100.4F), Mild Pain (1 - 3)  aluminum hydroxide/magnesium hydroxide/simethicone Suspension 30 milliLiter(s) Oral every 4 hours PRN Dyspepsia  dextrose Oral Gel 15 Gram(s) Oral once PRN Blood Glucose LESS THAN 70 milliGRAM(s)/deciliter  melatonin 3 milliGRAM(s) Oral at bedtime PRN Insomnia  ondansetron Injectable 4 milliGRAM(s) IV Push every 8 hours PRN Nausea and/or Vomiting   Mirvaso Counseling: Mirvaso is a topical medication which can decrease superficial blood flow where applied. Side effects are uncommon and include stinging, redness and allergic reactions.

## 2025-06-04 LAB
ALBUMIN SERPL ELPH-MCNC: 4.2 G/DL — SIGNIFICANT CHANGE UP (ref 3.5–5.2)
ALP SERPL-CCNC: 160 U/L — HIGH (ref 30–115)
ALT FLD-CCNC: 12 U/L — SIGNIFICANT CHANGE UP (ref 0–41)
ANION GAP SERPL CALC-SCNC: 13 MMOL/L — SIGNIFICANT CHANGE UP (ref 7–14)
AST SERPL-CCNC: 14 U/L — SIGNIFICANT CHANGE UP (ref 0–41)
BASOPHILS # BLD AUTO: 0.06 K/UL — SIGNIFICANT CHANGE UP (ref 0–0.2)
BASOPHILS NFR BLD AUTO: 0.8 % — SIGNIFICANT CHANGE UP (ref 0–1)
BILIRUB SERPL-MCNC: 0.2 MG/DL — SIGNIFICANT CHANGE UP (ref 0.2–1.2)
BUN SERPL-MCNC: 62 MG/DL — CRITICAL HIGH (ref 10–20)
CALCIUM SERPL-MCNC: 9.1 MG/DL — SIGNIFICANT CHANGE UP (ref 8.4–10.5)
CHLORIDE SERPL-SCNC: 95 MMOL/L — LOW (ref 98–110)
CO2 SERPL-SCNC: 29 MMOL/L — SIGNIFICANT CHANGE UP (ref 17–32)
CREAT SERPL-MCNC: 5.3 MG/DL — CRITICAL HIGH (ref 0.7–1.5)
EGFR: 11 ML/MIN/1.73M2 — LOW
EGFR: 11 ML/MIN/1.73M2 — LOW
EOSINOPHIL # BLD AUTO: 0.1 K/UL — SIGNIFICANT CHANGE UP (ref 0–0.7)
EOSINOPHIL NFR BLD AUTO: 1.3 % — SIGNIFICANT CHANGE UP (ref 0–8)
GLUCOSE BLDC GLUCOMTR-MCNC: 113 MG/DL — HIGH (ref 70–99)
GLUCOSE BLDC GLUCOMTR-MCNC: 83 MG/DL — SIGNIFICANT CHANGE UP (ref 70–99)
GLUCOSE BLDC GLUCOMTR-MCNC: 94 MG/DL — SIGNIFICANT CHANGE UP (ref 70–99)
GLUCOSE BLDC GLUCOMTR-MCNC: 99 MG/DL — SIGNIFICANT CHANGE UP (ref 70–99)
GLUCOSE SERPL-MCNC: 55 MG/DL — LOW (ref 70–99)
HCT VFR BLD CALC: 31.2 % — LOW (ref 42–52)
HGB BLD-MCNC: 9.9 G/DL — LOW (ref 14–18)
IMM GRANULOCYTES NFR BLD AUTO: 0.5 % — HIGH (ref 0.1–0.3)
LYMPHOCYTES # BLD AUTO: 0.29 K/UL — LOW (ref 1.2–3.4)
LYMPHOCYTES # BLD AUTO: 3.6 % — LOW (ref 20.5–51.1)
MCHC RBC-ENTMCNC: 31.7 G/DL — LOW (ref 32–37)
MCHC RBC-ENTMCNC: 33.4 PG — HIGH (ref 27–31)
MCV RBC AUTO: 105.4 FL — HIGH (ref 80–94)
MONOCYTES # BLD AUTO: 0.66 K/UL — HIGH (ref 0.1–0.6)
MONOCYTES NFR BLD AUTO: 8.3 % — SIGNIFICANT CHANGE UP (ref 1.7–9.3)
NEUTROPHILS # BLD AUTO: 6.81 K/UL — HIGH (ref 1.4–6.5)
NEUTROPHILS NFR BLD AUTO: 85.5 % — HIGH (ref 42.2–75.2)
NRBC BLD AUTO-RTO: 0 /100 WBCS — SIGNIFICANT CHANGE UP (ref 0–0)
PLATELET # BLD AUTO: 217 K/UL — SIGNIFICANT CHANGE UP (ref 130–400)
PMV BLD: 11.6 FL — HIGH (ref 7.4–10.4)
POTASSIUM SERPL-MCNC: 4.6 MMOL/L — SIGNIFICANT CHANGE UP (ref 3.5–5)
POTASSIUM SERPL-SCNC: 4.6 MMOL/L — SIGNIFICANT CHANGE UP (ref 3.5–5)
PROT SERPL-MCNC: 7.9 G/DL — SIGNIFICANT CHANGE UP (ref 6–8)
RBC # BLD: 2.96 M/UL — LOW (ref 4.7–6.1)
RBC # FLD: 13.7 % — SIGNIFICANT CHANGE UP (ref 11.5–14.5)
SODIUM SERPL-SCNC: 137 MMOL/L — SIGNIFICANT CHANGE UP (ref 135–146)
WBC # BLD: 7.96 K/UL — SIGNIFICANT CHANGE UP (ref 4.8–10.8)
WBC # FLD AUTO: 7.96 K/UL — SIGNIFICANT CHANGE UP (ref 4.8–10.8)

## 2025-06-04 PROCEDURE — 99233 SBSQ HOSP IP/OBS HIGH 50: CPT

## 2025-06-04 RX ORDER — INSULIN GLARGINE-YFGN 100 [IU]/ML
35 INJECTION, SOLUTION SUBCUTANEOUS AT BEDTIME
Refills: 0 | Status: DISCONTINUED | OUTPATIENT
Start: 2025-06-04 | End: 2025-06-05

## 2025-06-04 RX ORDER — MUPIROCIN CALCIUM 20 MG/G
1 CREAM TOPICAL
Refills: 0 | Status: COMPLETED | OUTPATIENT
Start: 2025-06-04 | End: 2025-06-11

## 2025-06-04 RX ADMIN — CYANOCOBALAMIN 1000 MICROGRAM(S): 1000 INJECTION INTRAMUSCULAR; SUBCUTANEOUS at 16:39

## 2025-06-04 RX ADMIN — Medication 1 DOSE(S): at 16:35

## 2025-06-04 RX ADMIN — METOPROLOL SUCCINATE 50 MILLIGRAM(S): 50 TABLET, EXTENDED RELEASE ORAL at 05:22

## 2025-06-04 RX ADMIN — Medication 5 MILLIGRAM(S): at 16:48

## 2025-06-04 RX ADMIN — SEVELAMER HYDROCHLORIDE 1600 MILLIGRAM(S): 800 TABLET ORAL at 16:37

## 2025-06-04 RX ADMIN — CLOPIDOGREL BISULFATE 75 MILLIGRAM(S): 75 TABLET, FILM COATED ORAL at 16:37

## 2025-06-04 RX ADMIN — Medication 30 MILLIGRAM(S): at 05:21

## 2025-06-04 RX ADMIN — INSULIN GLARGINE-YFGN 35 UNIT(S): 100 INJECTION, SOLUTION SUBCUTANEOUS at 22:10

## 2025-06-04 RX ADMIN — Medication 81 MILLIGRAM(S): at 16:35

## 2025-06-04 RX ADMIN — ATORVASTATIN CALCIUM 40 MILLIGRAM(S): 80 TABLET, FILM COATED ORAL at 22:10

## 2025-06-04 RX ADMIN — HEPARIN SODIUM 5000 UNIT(S): 1000 INJECTION INTRAVENOUS; SUBCUTANEOUS at 16:37

## 2025-06-04 RX ADMIN — FUROSEMIDE 80 MILLIGRAM(S): 10 INJECTION INTRAMUSCULAR; INTRAVENOUS at 05:21

## 2025-06-04 RX ADMIN — Medication 1 APPLICATION(S): at 16:38

## 2025-06-04 RX ADMIN — METOPROLOL SUCCINATE 50 MILLIGRAM(S): 50 TABLET, EXTENDED RELEASE ORAL at 16:37

## 2025-06-04 RX ADMIN — MUPIROCIN CALCIUM 1 APPLICATION(S): 20 CREAM TOPICAL at 16:38

## 2025-06-04 RX ADMIN — SEVELAMER HYDROCHLORIDE 1600 MILLIGRAM(S): 800 TABLET ORAL at 08:16

## 2025-06-04 RX ADMIN — Medication 1 APPLICATION(S): at 16:35

## 2025-06-04 RX ADMIN — Medication 5 MILLIGRAM(S): at 05:22

## 2025-06-04 RX ADMIN — FOLIC ACID 1 MILLIGRAM(S): 1 TABLET ORAL at 16:37

## 2025-06-04 RX ADMIN — HEPARIN SODIUM 5000 UNIT(S): 1000 INJECTION INTRAVENOUS; SUBCUTANEOUS at 05:21

## 2025-06-04 RX ADMIN — Medication 40 MILLIGRAM(S): at 05:21

## 2025-06-04 NOTE — PROGRESS NOTE ADULT - SUBJECTIVE AND OBJECTIVE BOX
[place] NEPHROLOGY FOLLOW UP NOTE  --------------------------------------------------------------------------------  24 hour events/subjective: Patient examined. Appears comfortable.    PAST HISTORY  --------------------------------------------------------------------------------  No significant changes to PMH, PSH, FHx, SHx, unless otherwise noted    ALLERGIES & MEDICATIONS  --------------------------------------------------------------------------------  Allergies    No Known Allergies    Intolerances      Standing Inpatient Medications  aspirin  chewable 81 milliGRAM(s) Oral daily  atorvastatin 40 milliGRAM(s) Oral at bedtime  bacitracin   Ointment 1 Application(s) Topical daily  chlorhexidine 2% Cloths 1 Application(s) Topical daily  clopidogrel Tablet 75 milliGRAM(s) Oral daily  cyanocobalamin 1000 MICROGram(s) Oral daily  dextrose 5%. 1000 milliLiter(s) IV Continuous <Continuous>  dextrose 5%. 1000 milliLiter(s) IV Continuous <Continuous>  dextrose 50% Injectable 25 Gram(s) IV Push once  dextrose 50% Injectable 12.5 Gram(s) IV Push once  dextrose 50% Injectable 25 Gram(s) IV Push once  fluticasone propionate/ salmeterol 250-50 MICROgram(s) Diskus 1 Dose(s) Inhalation two times a day  folic acid 1 milliGRAM(s) Oral daily  furosemide    Tablet 80 milliGRAM(s) Oral daily  glucagon  Injectable 1 milliGRAM(s) IntraMuscular once  heparin   Injectable 5000 Unit(s) SubCutaneous every 12 hours  insulin glargine Injectable (LANTUS) 40 Unit(s) SubCutaneous at bedtime  insulin lispro (ADMELOG) corrective regimen sliding scale   SubCutaneous three times a day before meals  metoclopramide 5 milliGRAM(s) Oral two times a day  metoprolol tartrate 50 milliGRAM(s) Oral two times a day  mupirocin 2% Nasal 1 Application(s) Both Nostrils two times a day  NIFEdipine XL 30 milliGRAM(s) Oral daily  pantoprazole    Tablet 40 milliGRAM(s) Oral before breakfast  senna 2 Tablet(s) Oral at bedtime  sevelamer carbonate 1600 milliGRAM(s) Oral three times a day with meals    PRN Inpatient Medications  acetaminophen     Tablet .. 650 milliGRAM(s) Oral every 6 hours PRN  aluminum hydroxide/magnesium hydroxide/simethicone Suspension 30 milliLiter(s) Oral every 4 hours PRN  dextrose Oral Gel 15 Gram(s) Oral once PRN  melatonin 3 milliGRAM(s) Oral at bedtime PRN  ondansetron Injectable 4 milliGRAM(s) IV Push every 8 hours PRN    VITALS/PHYSICAL EXAM  --------------------------------------------------------------------------------  T(C): 36.4 (06-04-25 @ 04:07), Max: 36.4 (06-04-25 @ 04:07)  HR: 60 (06-04-25 @ 04:07) (60 - 66)  BP: 135/71 (06-04-25 @ 04:07) (135/71 - 147/77)  RR: 18 (06-04-25 @ 04:07) (18 - 18)  SpO2: 100% (06-04-25 @ 04:07) (98% - 100%)    06-03-25 @ 07:01  -  06-04-25 @ 07:00  --------------------------------------------------------  IN: 716 mL / OUT: 0 mL / NET: 716 mL    06-04-25 @ 07:01  -  06-04-25 @ 12:38  --------------------------------------------------------  IN: 240 mL / OUT: 0 mL / NET: 240 mL    Physical Exam:  	Gen: NAD  	Pulm: CTA B/L  	CV: RRR, S1S2  	Abd: +BS, soft, nontender/nondistended  	: No suprapubic tenderness  	LE: Warm, edema  	Vascular access: AVF    LABS/STUDIES  --------------------------------------------------------------------------------              9.9    7.96  >-----------<  217      [06-04-25 @ 06:59]              31.2     137  |  95  |  62  ----------------------------<  55      [06-04-25 @ 06:59]  4.6   |  29  |  5.3        Ca     9.1     [06-04-25 @ 06:59]    TPro  7.9  /  Alb  4.2  /  TBili  0.2  /  DBili  x   /  AST  14  /  ALT  12  /  AlkPhos  160  [06-04-25 @ 06:59]    Creatinine Trend:  SCr 5.3 [06-04 @ 06:59]  SCr 4.4 [06-03 @ 06:01]  SCr 5.4 [06-02 @ 06:24]  SCr 5.2 [06-01 @ 21:46]  SCr 5.1 [05-27 @ 16:09]    Urinalysis - [06-04-25 @ 06:59]      Color  / Appearance  / SG  / pH       Gluc 55 / Ketone   / Bili  / Urobili        Blood  / Protein  / Leuk Est  / Nitrite       RBC  / WBC  / Hyaline  / Gran  / Sq Epi  / Non Sq Epi  / Bacteria     Iron 93, TIBC --, %sat --      [10-01-24 @ 07:00]  Ferritin 1129      [10-01-24 @ 07:00]  PTH -- (Ca 7.8)      [09-24-24 @ 06:56]   135  Vitamin D (25OH) 15      [09-25-24 @ 06:49]  Lipid: chol 81, TG 52, HDL 43, LDL --      [06-02-25 @ 06:24]

## 2025-06-04 NOTE — PROGRESS NOTE ADULT - ASSESSMENT
ESRD on HD MWF at [facility]  L heel ulcer  DM  BPH  CAD status post CABG  Hypertension  TIA  Peripheral arterial disease    Plan:    HD today: 3 hours, opti 160 dialyzer, 2K bath, 3.5L UF  Renally dose antibiotics  Sevelamer with meals  Renal diet  Fluid restriction

## 2025-06-04 NOTE — CHART NOTE - NSCHARTNOTEFT_GEN_A_CORE
Mr. Schwabacher has an appointment on Friday 6/13 at 10 AM with Dr. Malik, vascular surgery  [address] [place] [address], 3rd floor    Please, note upon his discharge
SCHWABACHER, LAWRENCE  65y  Male    Patient is a 65y old  Male who presents with a chief complaint of left heel ulcer.  The patient was seen and examined ,alert resting comfortably in bed, on 3L NC sat 96%   Reports pain controlled ,no new complaints.      PAST MEDICAL/SURGICAL HISTORY  PAST MEDICAL & SURGICAL HISTORY:  S/P CABG (coronary artery bypass graft)  x4      Diabetes mellitus      Transient ischemic attack (TIA)  2017; 2008      Chronic kidney disease (CKD)  Stage IV      Hypertension      Stented coronary artery  in 2008      Myocardial infarction  2012      PAD (peripheral artery disease)  S/p bypass left leg      HLD (hyperlipidemia)      BPH (benign prostatic hyperplasia)      Pain in left knee  s/p fall      OA (osteoarthritis)      [place] (hard of hearing)      Chronic anemia      History of medical problems  left arm precaution      S/P CABG (coronary artery bypass graft)  2012      H/O arterial bypass of lower limb  Left Lower Extremity (2016)      History of surgery  Left CEA (2017)  Left Pinkie toe Amputation (2014)  CABG x 4 (2012)  Card cath - stent (2008)        AV fistula  2019  LEFT AV FISTULA          REVIEW OF SYSTEMS:  CONSTITUTIONAL: No fever, weight loss, or fatigue  EYES: No eye pain, visual disturbances, or discharge  ENMT:  No difficulty hearing, tinnitus, vertigo; No sinus or throat pain  NECK: No pain or stiffness  BREASTS: No pain, masses, or nipple discharge  RESPIRATORY: No cough, wheezing, chills or hemoptysis; No shortness of breath  CARDIOVASCULAR: No chest pain, palpitations, dizziness, or leg swelling  GASTROINTESTINAL: No abdominal or epigastric pain. No nausea, vomiting, or hematemesis; No diarrhea or constipation. No melena or hematochezia.  GENITOURINARY: No dysuria, frequency, hematuria, or incontinence  NEUROLOGICAL: No headaches, memory loss, loss of strength, numbness, or tremors  SKIN: No itching, burning, rashes, or lesions   LYMPH NODES: No enlarged glands  ENDOCRINE: No heat or cold intolerance; No hair loss  MUSCULOSKELETAL: No joint pain or swelling; No muscle, back, or extremity pain  PSYCHIATRIC: No depression, anxiety, mood swings, or difficulty sleeping  HEME/LYMPH: No easy bruising, or bleeding gums  ALLERY AND IMMUNOLOGIC: No hives or eczema    T(C): 36.3 (06-02-25 @ 11:53), Max: 36.9 (06-02-25 @ 00:34)  HR: 69 (06-02-25 @ 12:00) (64 - 82)  BP: 137/83 (06-02-25 @ 12:00) (124/72 - 154/77)  RR: 18 (06-02-25 @ 12:00) (17 - 18)  SpO2: 96% (06-02-25 @ 12:00) (90% - 96%)  Wt(kg): --Vital Signs Last 24 Hrs  T(C): 36.3 (02 Jun 2025 11:53), Max: 36.9 (02 Jun 2025 00:34)  T(F): 97.3 (02 Jun 2025 11:53), Max: 98.5 (02 Jun 2025 00:34)  HR: 69 (02 Jun 2025 12:00) (64 - 82)  BP: 137/83 (02 Jun 2025 12:00) (124/72 - 154/77)  BP(mean): 97 (02 Jun 2025 04:43) (97 - 101)  RR: 18 (02 Jun 2025 12:00) (17 - 18)  SpO2: 96% (02 Jun 2025 12:00) (90% - 96%)    Parameters below as of 02 Jun 2025 12:00  Patient On (Oxygen Delivery Method): nasal cannula  O2 Flow (L/min): 3      PHYSICAL EXAM:  GENERAL: NAD, well-groomed, well-developed ,on NC  HEAD:  Atraumatic, Normocephalic  NERVOUS SYSTEM:  Alert & Oriented X3  CHEST/LUNG: no wheezing ,decreased air entry at the base bilaterally   HEART: Regular rate and rhythm; No murmurs, rubs, or gallops  ABDOMEN: Soft, Nontender, Nondistended; Bowel sounds present  EXTREMITIES:  left foot wrapped   SKIN: No rashes or lesions    Consultant(s) Notes Reviewed:  [x ] YES  [ ] NO  Care Discussed with Consultants/Other Providers [ x] YES  [ ] NO    LABS:  CBC   06-02-25 @ 06:24  Hematcorit 28.3  Hemoglobin 9.1  Mean Cell Hemoglobin 33.5  Platelet Count-Automated 204  RBC Count 2.72  Red Cell Distrib Width 13.9  Wbc Count 7.62  06-01-25 @ 21:46  Hematcorit 28.9  Hemoglobin 9.4  Mean Cell Hemoglobin 33.8  Platelet Count-Automated 207  RBC Count 2.78  Red Cell Distrib Width 13.9  Wbc Count 7.88      CMP  06-02-25 @ 06:24  Supriya Aminotransferase(ALT/SGPT)15  Albumin, Serum 3.5  Alkaline Phosphatase, Serum 169  Anion Gap, Serum 17  Aspartate Aminotransferase (AST/SGOT)16  Bilirubin Total, Serum 0.2  Blood Urea Nitrogen, Serum 70  Calcium,Total Serum 8.6  Carbon Dioxide, Serum 27  Chloride, Serum 92  Creatinine, Serum 5.4  eGFR if  --  eGFR if Non African American --  Glucose, Serum 94  Potassium, Serum 5.0  Protein Total, Serum 6.8  Sodium, Serum 136    ASSESSMENT AND PLAN:   65-year-old male with a past medical history of end-stage renal disease on hemodialysis Monday Wednesday Friday via left arm AV fistula, diabetes, BPH, CAD status post CABG, hypertension, CHF, TIA, peripheral arterial disease, history of left toe ulcers status post amputations presents to the ED for evaluation abrasion to the left heel which she sustained about an hour ago which has now stopped bleeding.  Patient is on aspirin and Plavix.  Patient denies fever, chills, weakness, or recent trauma.    #Heel Ulcer  #ESRD- MWF  #AHRF likely sec to fluid overload   #CAD sp CABG  #PAD  #DM2    - Podiatry consult appreciated follow up outpatient   - Follow ID consult   - wound dressing and care  - left arterial duplex to rule out severe PAD  - supplement oxygen as needed ,wean off as tolerated   - CXR in am   - nephro following   - trend chemistry and correct as needed  -cont ASA PLAVIX, and home simvastatin switched to atorvastatin      #DVT: Heparin  #GI: pantoprazole   #Handoff : HD today ,wean off NC as tolerated ,CXR in am ,ID eval ,prepare for dc tomorrow.                                            RADIOLOGY & ADDITIONAL TESTS:    Imaging Personally Reviewed:  [ ] YES  [ ] NO

## 2025-06-04 NOTE — PROGRESS NOTE ADULT - ASSESSMENT
65-year-old male with a past medical history of end-stage renal disease on hemodialysis Monday Wednesday Friday via left arm AV fistula, diabetes, BPH, CAD status post CABG, hypertension, CHF, TIA, peripheral arterial disease, history of left toe ulcers status post amputations presents to the ED for evaluation abrasion to the left heel which she sustained about an hour ago which has now stopped bleeding.  Patient is on aspirin and Plavix.  Patient denies fever, chills, weakness, or recent trauma.    A/P:   Volume overload:   Patient with pleural effusion on left, leg edema, scrotal edema  CXR showed large left pleural effusion, pulmonary congestion.   Echo in Sept 2024 showed LVEF 58%  Patient with minimal urine output.   Fluid restriction <1L day, continue fluid removal during HD.     Left Superficial Heel Ulcer  Diabetic foot ulcer with Charcot Deformity  PAD:   Foot x ray showed Stable severe Charcot arthropathy with loss of normal plantar arch. Severe midfoot and forefoot degenerative changes  Arterial duplex showed The right posterior tibial artery is occluded. Moderate stenosis of the right dorsalis pedis artery. Left posterior tibial and peroneal arteries are occluded.  Seen by podiatry and Infectious disease, wound care, no need for antibiotics, WBAT.   Follow up with Vascular outpatient.     ESRD  Renagel 1600 tid  Nephrology follow up.     CAD sp CABG  Continue ASA, Plavix Lipitor.     DM2  Lantus 40 home dose cont.      DVT: Heparin  #GI: pantoprazole     #Progress Note Handoff:  Pending (specify): improving volume overload.   Family discussion:  Disposition: Home.

## 2025-06-04 NOTE — PROGRESS NOTE ADULT - SUBJECTIVE AND OBJECTIVE BOX
SUBJECTIVE/OVERNIGHT EVENTS  Today is hospital day 5d. This morning patient was seen and examined at bedside, resting comfortably in bed. No acute or major events overnight.        MEDICATIONS  STANDING MEDICATIONS  aspirin  chewable 81 milliGRAM(s) Oral daily  atorvastatin 40 milliGRAM(s) Oral at bedtime  bacitracin   Ointment 1 Application(s) Topical daily  chlorhexidine 2% Cloths 1 Application(s) Topical daily  clopidogrel Tablet 75 milliGRAM(s) Oral daily  cyanocobalamin 1000 MICROGram(s) Oral daily  dextrose 5%. 1000 milliLiter(s) IV Continuous <Continuous>  dextrose 5%. 1000 milliLiter(s) IV Continuous <Continuous>  dextrose 50% Injectable 25 Gram(s) IV Push once  dextrose 50% Injectable 12.5 Gram(s) IV Push once  dextrose 50% Injectable 25 Gram(s) IV Push once  fluticasone propionate/ salmeterol 250-50 MICROgram(s) Diskus 1 Dose(s) Inhalation two times a day  folic acid 1 milliGRAM(s) Oral daily  furosemide    Tablet 80 milliGRAM(s) Oral daily  glucagon  Injectable 1 milliGRAM(s) IntraMuscular once  heparin   Injectable 5000 Unit(s) SubCutaneous every 12 hours  insulin glargine Injectable (LANTUS) 40 Unit(s) SubCutaneous at bedtime  insulin lispro (ADMELOG) corrective regimen sliding scale   SubCutaneous three times a day before meals  metoclopramide 5 milliGRAM(s) Oral two times a day  metoprolol tartrate 50 milliGRAM(s) Oral two times a day  mupirocin 2% Nasal 1 Application(s) Both Nostrils two times a day  NIFEdipine XL 30 milliGRAM(s) Oral daily  pantoprazole    Tablet 40 milliGRAM(s) Oral before breakfast  senna 2 Tablet(s) Oral at bedtime  sevelamer carbonate 1600 milliGRAM(s) Oral three times a day with meals    PRN MEDICATIONS  acetaminophen     Tablet .. 650 milliGRAM(s) Oral every 6 hours PRN  aluminum hydroxide/magnesium hydroxide/simethicone Suspension 30 milliLiter(s) Oral every 4 hours PRN  dextrose Oral Gel 15 Gram(s) Oral once PRN  melatonin 3 milliGRAM(s) Oral at bedtime PRN  ondansetron Injectable 4 milliGRAM(s) IV Push every 8 hours PRN    VITALS  T(F): 97.6 (06-04-25 @ 04:07), Max: 97.6 (06-04-25 @ 04:07)  HR: 60 (06-04-25 @ 04:07) (60 - 72)  BP: 135/71 (06-04-25 @ 04:07) (130/73 - 147/77)  RR: 18 (06-04-25 @ 04:07) (18 - 18)  SpO2: 100% (06-04-25 @ 04:07) (98% - 100%)  POCT Blood Glucose.: 113 mg/dL (06-04-25 @ 11:31)  POCT Blood Glucose.: 83 mg/dL (06-04-25 @ 07:55)  POCT Blood Glucose.: 152 mg/dL (06-03-25 @ 21:41)  POCT Blood Glucose.: 104 mg/dL (06-03-25 @ 16:47)  POCT Blood Glucose.: 113 mg/dL (06-03-25 @ 11:44)      PHYSICAL EXAM:  GENERAL: NAD, well-groomed, well-developed  HEAD:  Atraumatic, Normocephalic  EYES: EOMI, PERRLA, conjunctiva and sclera clear  ENMT:  Moist mucous membranes  NECK: Supple, No JVD,  CHEST/LUNG: Clear to auscultation bilaterally  HEART: Regular rate and rhythm  ABDOMEN: Soft, Nontender, Nondistended; Bowel sounds present  NEURO:  MENTAL STATUS: AAOx3  LANG/SPEECH: Fluent, intact naming, repetition & comprehension  CRANIAL NERVES:  II: Pupils equal and reactive, no RAPD, normal visual field and fundus  III, IV, VI: EOM intact, no gaze preference or deviation  V: normal  VII: no facial asymmetry  VIII: normal hearing to speech  MOTOR: 5/5 in both upper and lower extremities  REFLEXES: 2/4 throughout  SENSORY: Normal to touch  COORD: Normal finger to nose   Gait:   EXTREMITIES: No LE edema, no calf tenderness  LYMPH: No lymphadenopathy noted  SKIN: No rashes or lesions         LABS             9.9    7.96  )-----------( 217      ( 06-04-25 @ 06:59 )             31.2     137  |  95  |  62  -------------------------<  55   06-04-25 @ 06:59  4.6  |  29  |  5.3    Ca      9.1     06-04-25 @ 06:59    TPro  7.9  /  Alb  4.2  /  TBili  0.2  /  DBili  x   /  AST  14  /  ALT  12  /  AlkPhos  160  /  GGT  x     06-04-25 @ 06:59      Troponin T, High Sensitivity Result: 317 ng/L (06-01-25 @ 23:38)  Pro-Brain Natriuretic Peptide: 59937 pg/mL (06-01-25 @ 21:46)  Troponin T, High Sensitivity Result: 315 ng/L (06-01-25 @ 21:46)    Urinalysis Basic - ( 04 Jun 2025 06:59 )    Color: x / Appearance: x / SG: x / pH: x  Gluc: 55 mg/dL / Ketone: x  / Bili: x / Urobili: x   Blood: x / Protein: x / Nitrite: x   Leuk Esterase: x / RBC: x / WBC x   Sq Epi: x / Non Sq Epi: x / Bacteria: x          IMAGING      ASSESSMENT AND PLAN:                                                             SUBJECTIVE/OVERNIGHT EVENTS  Today is hospital day 5d. This morning patient was seen and examined at bedside, resting comfortably in bed. No acute or major events overnight. Weaned pt off of oxygen. Pt will be going to HD as per nephrology.         MEDICATIONS  STANDING MEDICATIONS  aspirin  chewable 81 milliGRAM(s) Oral daily  atorvastatin 40 milliGRAM(s) Oral at bedtime  bacitracin   Ointment 1 Application(s) Topical daily  chlorhexidine 2% Cloths 1 Application(s) Topical daily  clopidogrel Tablet 75 milliGRAM(s) Oral daily  cyanocobalamin 1000 MICROGram(s) Oral daily  dextrose 5%. 1000 milliLiter(s) IV Continuous <Continuous>  dextrose 5%. 1000 milliLiter(s) IV Continuous <Continuous>  dextrose 50% Injectable 25 Gram(s) IV Push once  dextrose 50% Injectable 12.5 Gram(s) IV Push once  dextrose 50% Injectable 25 Gram(s) IV Push once  fluticasone propionate/ salmeterol 250-50 MICROgram(s) Diskus 1 Dose(s) Inhalation two times a day  folic acid 1 milliGRAM(s) Oral daily  furosemide    Tablet 80 milliGRAM(s) Oral daily  glucagon  Injectable 1 milliGRAM(s) IntraMuscular once  heparin   Injectable 5000 Unit(s) SubCutaneous every 12 hours  insulin glargine Injectable (LANTUS) 40 Unit(s) SubCutaneous at bedtime  insulin lispro (ADMELOG) corrective regimen sliding scale   SubCutaneous three times a day before meals  metoclopramide 5 milliGRAM(s) Oral two times a day  metoprolol tartrate 50 milliGRAM(s) Oral two times a day  mupirocin 2% Nasal 1 Application(s) Both Nostrils two times a day  NIFEdipine XL 30 milliGRAM(s) Oral daily  pantoprazole    Tablet 40 milliGRAM(s) Oral before breakfast  senna 2 Tablet(s) Oral at bedtime  sevelamer carbonate 1600 milliGRAM(s) Oral three times a day with meals    PRN MEDICATIONS  acetaminophen     Tablet .. 650 milliGRAM(s) Oral every 6 hours PRN  aluminum hydroxide/magnesium hydroxide/simethicone Suspension 30 milliLiter(s) Oral every 4 hours PRN  dextrose Oral Gel 15 Gram(s) Oral once PRN  melatonin 3 milliGRAM(s) Oral at bedtime PRN  ondansetron Injectable 4 milliGRAM(s) IV Push every 8 hours PRN    VITALS  T(F): 97.6 (06-04-25 @ 04:07), Max: 97.6 (06-04-25 @ 04:07)  HR: 60 (06-04-25 @ 04:07) (60 - 72)  BP: 135/71 (06-04-25 @ 04:07) (130/73 - 147/77)  RR: 18 (06-04-25 @ 04:07) (18 - 18)  SpO2: 100% (06-04-25 @ 04:07) (98% - 100%)  POCT Blood Glucose.: 113 mg/dL (06-04-25 @ 11:31)  POCT Blood Glucose.: 83 mg/dL (06-04-25 @ 07:55)  POCT Blood Glucose.: 152 mg/dL (06-03-25 @ 21:41)  POCT Blood Glucose.: 104 mg/dL (06-03-25 @ 16:47)  POCT Blood Glucose.: 113 mg/dL (06-03-25 @ 11:44)      PHYSICAL EXAM:  GENERAL: NAD, well-groomed, well-developed  HEAD:  Atraumatic, Normocephalic  EYES: EOMI, PERRLA, conjunctiva and sclera clear  ENMT:  Moist mucous membranes  NECK: Supple,  CHEST/LUNG: Lung sounds diminished  HEART: Regular rate and rhythm  ABDOMEN: Soft, Nontender, Nondistended  NEURO:  MENTAL STATUS: AAOx3  LANG/SPEECH: Fluent  CRANIAL NERVES:  II: Pupils equal and reactive, no RAPD, normal visual field and fundus  III, IV, VI: EOM intact, no gaze preference or deviation  V: normal  VII: no facial asymmetry  VIII: normal hearing to speech  MOTOR: 5/5  REFLEXES: 2/4 throughout  SENSORY: Normal to touch  COORD: Normal finger to nose   Gait:   EXTREMITIES: No LE edema  LYMPH: No lymphadenopathy noted  SKIN: superficial pressure ulcer L heel         LABS             9.9    7.96  )-----------( 217      ( 06-04-25 @ 06:59 )             31.2     137  |  95  |  62  -------------------------<  55   06-04-25 @ 06:59  4.6  |  29  |  5.3    Ca      9.1     06-04-25 @ 06:59    TPro  7.9  /  Alb  4.2  /  TBili  0.2  /  DBili  x   /  AST  14  /  ALT  12  /  AlkPhos  160  /  GGT  x     06-04-25 @ 06:59      Troponin T, High Sensitivity Result: 317 ng/L (06-01-25 @ 23:38)  Pro-Brain Natriuretic Peptide: 40816 pg/mL (06-01-25 @ 21:46)  Troponin T, High Sensitivity Result: 315 ng/L (06-01-25 @ 21:46)    Urinalysis Basic - ( 04 Jun 2025 06:59 )    Color: x / Appearance: x / SG: x / pH: x  Gluc: 55 mg/dL / Ketone: x  / Bili: x / Urobili: x   Blood: x / Protein: x / Nitrite: x   Leuk Esterase: x / RBC: x / WBC x   Sq Epi: x / Non Sq Epi: x / Bacteria: x          IMAGING  < from: VA Duplex Lower Extrem Arterial, Bilat (06.02.25 @ 16:34) >    The left common femoral, deep femoral, superficial femoral, popliteal,   anterior tibial, posterior tibial and peroneal arteries were visualized.   Posterior tibial and peroneal arteries are occluded. Dorsalis pedis   artery not visualized.    < end of copied text >        ASSESSMENT AND PLAN:  #Heel Ulcer  - Uynasn 1.5mg q24 hrs  - WBAT  - wound dressing and care  - left arterial duplex to rule out severe PAD    #ESRD  - MWF  - nephro consult  - Renagel 1600 tid  - trend chemistry and correct as needed  - sp lokelma in the ED   - Nifedpine 30mg  - Lasix     #CAD sp CABG  #PAD  - arterial duplex for non healing ulcer  - ASA PLAVIX  - home simvestatin switched to atorvastatn  - LL VAS art     #DM2  - Lantus 40 home dose cont.  - ISS  - on home 5,5,5 lispro  - BS    #DVT: Heparin  #GI: pantoprazole                                                              SUBJECTIVE/OVERNIGHT EVENTS  Today is hospital day 5d. This morning patient was seen and examined at bedside, resting comfortably in bed. No acute or major events overnight. Weaned pt off of oxygen. Pt will be going to HD as per nephrology.         MEDICATIONS  STANDING MEDICATIONS  aspirin  chewable 81 milliGRAM(s) Oral daily  atorvastatin 40 milliGRAM(s) Oral at bedtime  bacitracin   Ointment 1 Application(s) Topical daily  chlorhexidine 2% Cloths 1 Application(s) Topical daily  clopidogrel Tablet 75 milliGRAM(s) Oral daily  cyanocobalamin 1000 MICROGram(s) Oral daily  dextrose 5%. 1000 milliLiter(s) IV Continuous <Continuous>  dextrose 5%. 1000 milliLiter(s) IV Continuous <Continuous>  dextrose 50% Injectable 25 Gram(s) IV Push once  dextrose 50% Injectable 12.5 Gram(s) IV Push once  dextrose 50% Injectable 25 Gram(s) IV Push once  fluticasone propionate/ salmeterol 250-50 MICROgram(s) Diskus 1 Dose(s) Inhalation two times a day  folic acid 1 milliGRAM(s) Oral daily  furosemide    Tablet 80 milliGRAM(s) Oral daily  glucagon  Injectable 1 milliGRAM(s) IntraMuscular once  heparin   Injectable 5000 Unit(s) SubCutaneous every 12 hours  insulin glargine Injectable (LANTUS) 40 Unit(s) SubCutaneous at bedtime  insulin lispro (ADMELOG) corrective regimen sliding scale   SubCutaneous three times a day before meals  metoclopramide 5 milliGRAM(s) Oral two times a day  metoprolol tartrate 50 milliGRAM(s) Oral two times a day  mupirocin 2% Nasal 1 Application(s) Both Nostrils two times a day  NIFEdipine XL 30 milliGRAM(s) Oral daily  pantoprazole    Tablet 40 milliGRAM(s) Oral before breakfast  senna 2 Tablet(s) Oral at bedtime  sevelamer carbonate 1600 milliGRAM(s) Oral three times a day with meals    PRN MEDICATIONS  acetaminophen     Tablet .. 650 milliGRAM(s) Oral every 6 hours PRN  aluminum hydroxide/magnesium hydroxide/simethicone Suspension 30 milliLiter(s) Oral every 4 hours PRN  dextrose Oral Gel 15 Gram(s) Oral once PRN  melatonin 3 milliGRAM(s) Oral at bedtime PRN  ondansetron Injectable 4 milliGRAM(s) IV Push every 8 hours PRN    VITALS  T(F): 97.6 (06-04-25 @ 04:07), Max: 97.6 (06-04-25 @ 04:07)  HR: 60 (06-04-25 @ 04:07) (60 - 72)  BP: 135/71 (06-04-25 @ 04:07) (130/73 - 147/77)  RR: 18 (06-04-25 @ 04:07) (18 - 18)  SpO2: 100% (06-04-25 @ 04:07) (98% - 100%)  POCT Blood Glucose.: 113 mg/dL (06-04-25 @ 11:31)  POCT Blood Glucose.: 83 mg/dL (06-04-25 @ 07:55)  POCT Blood Glucose.: 152 mg/dL (06-03-25 @ 21:41)  POCT Blood Glucose.: 104 mg/dL (06-03-25 @ 16:47)  POCT Blood Glucose.: 113 mg/dL (06-03-25 @ 11:44)      PHYSICAL EXAM:  GENERAL: NAD, well-groomed, well-developed  HEAD:  Atraumatic, Normocephalic  EYES: EOMI, PERRLA, conjunctiva and sclera clear  ENMT:  Moist mucous membranes  NECK: Supple,  CHEST/LUNG: Lung sounds diminished  HEART: Regular rate and rhythm  ABDOMEN: Soft, Nontender, Nondistended  NEURO:  MENTAL STATUS: AAOx3  LANG/SPEECH: Fluent  CRANIAL NERVES:  II: Pupils equal and reactive, no RAPD, normal visual field and fundus  III, IV, VI: EOM intact, no gaze preference or deviation  V: normal  VII: no facial asymmetry  VIII: normal hearing to speech  MOTOR: 5/5  REFLEXES: 2/4 throughout  SENSORY: Normal to touch  COORD: Normal finger to nose   Gait:   EXTREMITIES: No LE edema  LYMPH: No lymphadenopathy noted  SKIN: superficial pressure ulcer L heel         LABS             9.9    7.96  )-----------( 217      ( 06-04-25 @ 06:59 )             31.2     137  |  95  |  62  -------------------------<  55   06-04-25 @ 06:59  4.6  |  29  |  5.3    Ca      9.1     06-04-25 @ 06:59    TPro  7.9  /  Alb  4.2  /  TBili  0.2  /  DBili  x   /  AST  14  /  ALT  12  /  AlkPhos  160  /  GGT  x     06-04-25 @ 06:59      Troponin T, High Sensitivity Result: 317 ng/L (06-01-25 @ 23:38)  Pro-Brain Natriuretic Peptide: 60487 pg/mL (06-01-25 @ 21:46)  Troponin T, High Sensitivity Result: 315 ng/L (06-01-25 @ 21:46)    Urinalysis Basic - ( 04 Jun 2025 06:59 )    Color: x / Appearance: x / SG: x / pH: x  Gluc: 55 mg/dL / Ketone: x  / Bili: x / Urobili: x   Blood: x / Protein: x / Nitrite: x   Leuk Esterase: x / RBC: x / WBC x   Sq Epi: x / Non Sq Epi: x / Bacteria: x          IMAGING  < from: VA Duplex Lower Extrem Arterial, Bilat (06.02.25 @ 16:34) >    The left common femoral, deep femoral, superficial femoral, popliteal,   anterior tibial, posterior tibial and peroneal arteries were visualized.   Posterior tibial and peroneal arteries are occluded. Dorsalis pedis   artery not visualized.    < end of copied text >        ASSESSMENT AND PLAN:  #Heel Ulcer  - WBAT  - wound dressing and care  - the left common femoral, deep femoral, superficial femoral, popliteal,   anterior tibial, posterior tibial and peroneal arteries were visualized.   Posterior tibial and peroneal arteries are occluded. Dorsalis pedis   artery not visualized.      #ESRD  - Pt went to HD today and tolerated well.  - nephro consult  - Renagel 1600 tid  - trend chemistry and correct as needed  - sp lokelma in the ED   - Nifedpine 30mg  - Lasix     #CAD sp CABG  #PAD  - arterial duplex for non healing ulcer  - ASA PLAVIX  - home simvestatin switched to atorvastatn  - LL VAS art     #DM2  - Lantus 40 home dose cont.  - ISS  - on home 5,5,5 lispro  - BS    #DVT: Heparin  #GI: pantoprazole

## 2025-06-04 NOTE — PROGRESS NOTE ADULT - SUBJECTIVE AND OBJECTIVE BOX
SCHWABACHER, LAWRENCE  65y  Male      Patient is a 65y old  Male who presents with a chief complaint of L Heel Ulcer (03 Jun 2025 14:24)      INTERVAL HPI/OVERNIGHT EVENTS:  He feels ok, no new complaints, went for HD today.   Vital Signs Last 24 Hrs  T(C): 36.4 (04 Jun 2025 04:07), Max: 36.4 (04 Jun 2025 04:07)  T(F): 97.6 (04 Jun 2025 04:07), Max: 97.6 (04 Jun 2025 04:07)  HR: 77 (04 Jun 2025 15:45) (60 - 77)  BP: 140/74 (04 Jun 2025 15:45) (135/71 - 149/74)  BP(mean): 93 (04 Jun 2025 04:07) (93 - 100)  RR: 18 (04 Jun 2025 15:45) (18 - 18)  SpO2: 100% (04 Jun 2025 04:07) (98% - 100%)    Parameters below as of 04 Jun 2025 15:45  Patient On (Oxygen Delivery Method): room air          06-03-25 @ 07:01  -  06-04-25 @ 07:00  --------------------------------------------------------  IN: 716 mL / OUT: 0 mL / NET: 716 mL    06-04-25 @ 07:01  -  06-04-25 @ 17:14  --------------------------------------------------------  IN: 240 mL / OUT: 3500 mL / NET: -3260 mL            Consultant(s) Notes Reviewed:  [x ] YES  [ ] NO          MEDICATIONS  (STANDING):  aspirin  chewable 81 milliGRAM(s) Oral daily  atorvastatin 40 milliGRAM(s) Oral at bedtime  bacitracin   Ointment 1 Application(s) Topical daily  chlorhexidine 2% Cloths 1 Application(s) Topical daily  clopidogrel Tablet 75 milliGRAM(s) Oral daily  cyanocobalamin 1000 MICROGram(s) Oral daily  dextrose 5%. 1000 milliLiter(s) (100 mL/Hr) IV Continuous <Continuous>  dextrose 5%. 1000 milliLiter(s) (50 mL/Hr) IV Continuous <Continuous>  dextrose 50% Injectable 25 Gram(s) IV Push once  dextrose 50% Injectable 12.5 Gram(s) IV Push once  dextrose 50% Injectable 25 Gram(s) IV Push once  fluticasone propionate/ salmeterol 250-50 MICROgram(s) Diskus 1 Dose(s) Inhalation two times a day  folic acid 1 milliGRAM(s) Oral daily  furosemide    Tablet 80 milliGRAM(s) Oral daily  glucagon  Injectable 1 milliGRAM(s) IntraMuscular once  heparin   Injectable 5000 Unit(s) SubCutaneous every 12 hours  insulin glargine Injectable (LANTUS) 40 Unit(s) SubCutaneous at bedtime  insulin lispro (ADMELOG) corrective regimen sliding scale   SubCutaneous three times a day before meals  metoclopramide 5 milliGRAM(s) Oral two times a day  metoprolol tartrate 50 milliGRAM(s) Oral two times a day  mupirocin 2% Nasal 1 Application(s) Both Nostrils two times a day  NIFEdipine XL 30 milliGRAM(s) Oral daily  pantoprazole    Tablet 40 milliGRAM(s) Oral before breakfast  senna 2 Tablet(s) Oral at bedtime  sevelamer carbonate 1600 milliGRAM(s) Oral three times a day with meals    MEDICATIONS  (PRN):  acetaminophen     Tablet .. 650 milliGRAM(s) Oral every 6 hours PRN Temp greater or equal to 38C (100.4F), Mild Pain (1 - 3)  aluminum hydroxide/magnesium hydroxide/simethicone Suspension 30 milliLiter(s) Oral every 4 hours PRN Dyspepsia  dextrose Oral Gel 15 Gram(s) Oral once PRN Blood Glucose LESS THAN 70 milliGRAM(s)/deciliter  melatonin 3 milliGRAM(s) Oral at bedtime PRN Insomnia  ondansetron Injectable 4 milliGRAM(s) IV Push every 8 hours PRN Nausea and/or Vomiting      LABS                          9.9    7.96  )-----------( 217      ( 04 Jun 2025 06:59 )             31.2     06-04    137  |  95[L]  |  62[HH]  ----------------------------<  55[L]  4.6   |  29  |  5.3[HH]    Ca    9.1      04 Jun 2025 06:59    TPro  7.9  /  Alb  4.2  /  TBili  0.2  /  DBili  x   /  AST  14  /  ALT  12  /  AlkPhos  160[H]  06-04      Urinalysis Basic - ( 04 Jun 2025 06:59 )    Color: x / Appearance: x / SG: x / pH: x  Gluc: 55 mg/dL / Ketone: x  / Bili: x / Urobili: x   Blood: x / Protein: x / Nitrite: x   Leuk Esterase: x / RBC: x / WBC x   Sq Epi: x / Non Sq Epi: x / Bacteria: x        Lactate Trend        CAPILLARY BLOOD GLUCOSE      POCT Blood Glucose.: 94 mg/dL (04 Jun 2025 16:41)        RADIOLOGY & ADDITIONAL TESTS:    Imaging Personally Reviewed:  [ ] YES  [ ] NO    HEALTH ISSUES - PROBLEM Dx:          PHYSICAL EXAM:  GENERAL: NAD, well-developed.  HEAD:  Atraumatic, Normocephalic.  EYES: EOMI, PERRLA, conjunctiva and sclera clear.  NECK: Supple, No JVD.  CHEST/LUNG: decrease breath sounds on left lower lung  HEART: Regular rate and rhythm; S1 S2.   ABDOMEN: Soft, Nontender, Nondistended; Bowel sounds present.  EXTREMITIES:  left foot heel ulcer, no drainage. Lower leg edema L>R 3+

## 2025-06-05 LAB
ALBUMIN SERPL ELPH-MCNC: 3.7 G/DL — SIGNIFICANT CHANGE UP (ref 3.5–5.2)
ALP SERPL-CCNC: 148 U/L — HIGH (ref 30–115)
ALT FLD-CCNC: 11 U/L — SIGNIFICANT CHANGE UP (ref 0–41)
ANION GAP SERPL CALC-SCNC: 11 MMOL/L — SIGNIFICANT CHANGE UP (ref 7–14)
AST SERPL-CCNC: 13 U/L — SIGNIFICANT CHANGE UP (ref 0–41)
BASOPHILS # BLD AUTO: 0.08 K/UL — SIGNIFICANT CHANGE UP (ref 0–0.2)
BASOPHILS NFR BLD AUTO: 1.2 % — HIGH (ref 0–1)
BILIRUB SERPL-MCNC: 0.2 MG/DL — SIGNIFICANT CHANGE UP (ref 0.2–1.2)
BUN SERPL-MCNC: 43 MG/DL — HIGH (ref 10–20)
CALCIUM SERPL-MCNC: 8.7 MG/DL — SIGNIFICANT CHANGE UP (ref 8.4–10.5)
CHLORIDE SERPL-SCNC: 94 MMOL/L — LOW (ref 98–110)
CO2 SERPL-SCNC: 31 MMOL/L — SIGNIFICANT CHANGE UP (ref 17–32)
CREAT SERPL-MCNC: 4.4 MG/DL — CRITICAL HIGH (ref 0.7–1.5)
EGFR: 14 ML/MIN/1.73M2 — LOW
EGFR: 14 ML/MIN/1.73M2 — LOW
EOSINOPHIL # BLD AUTO: 0.15 K/UL — SIGNIFICANT CHANGE UP (ref 0–0.7)
EOSINOPHIL NFR BLD AUTO: 2.3 % — SIGNIFICANT CHANGE UP (ref 0–8)
GLUCOSE BLDC GLUCOMTR-MCNC: 115 MG/DL — HIGH (ref 70–99)
GLUCOSE BLDC GLUCOMTR-MCNC: 129 MG/DL — HIGH (ref 70–99)
GLUCOSE BLDC GLUCOMTR-MCNC: 181 MG/DL — HIGH (ref 70–99)
GLUCOSE BLDC GLUCOMTR-MCNC: 67 MG/DL — LOW (ref 70–99)
GLUCOSE SERPL-MCNC: 62 MG/DL — LOW (ref 70–99)
HCT VFR BLD CALC: 29.4 % — LOW (ref 42–52)
HGB BLD-MCNC: 9.5 G/DL — LOW (ref 14–18)
IMM GRANULOCYTES NFR BLD AUTO: 0.3 % — SIGNIFICANT CHANGE UP (ref 0.1–0.3)
LYMPHOCYTES # BLD AUTO: 0.45 K/UL — LOW (ref 1.2–3.4)
LYMPHOCYTES # BLD AUTO: 6.9 % — LOW (ref 20.5–51.1)
MCHC RBC-ENTMCNC: 32.3 G/DL — SIGNIFICANT CHANGE UP (ref 32–37)
MCHC RBC-ENTMCNC: 33.3 PG — HIGH (ref 27–31)
MCV RBC AUTO: 103.2 FL — HIGH (ref 80–94)
MONOCYTES # BLD AUTO: 0.7 K/UL — HIGH (ref 0.1–0.6)
MONOCYTES NFR BLD AUTO: 10.7 % — HIGH (ref 1.7–9.3)
NEUTROPHILS # BLD AUTO: 5.13 K/UL — SIGNIFICANT CHANGE UP (ref 1.4–6.5)
NEUTROPHILS NFR BLD AUTO: 78.6 % — HIGH (ref 42.2–75.2)
NRBC BLD AUTO-RTO: 0 /100 WBCS — SIGNIFICANT CHANGE UP (ref 0–0)
PLATELET # BLD AUTO: 218 K/UL — SIGNIFICANT CHANGE UP (ref 130–400)
PMV BLD: 11.5 FL — HIGH (ref 7.4–10.4)
POTASSIUM SERPL-MCNC: 4.3 MMOL/L — SIGNIFICANT CHANGE UP (ref 3.5–5)
POTASSIUM SERPL-SCNC: 4.3 MMOL/L — SIGNIFICANT CHANGE UP (ref 3.5–5)
PROT SERPL-MCNC: 7.7 G/DL — SIGNIFICANT CHANGE UP (ref 6–8)
RBC # BLD: 2.85 M/UL — LOW (ref 4.7–6.1)
RBC # FLD: 13.4 % — SIGNIFICANT CHANGE UP (ref 11.5–14.5)
SODIUM SERPL-SCNC: 136 MMOL/L — SIGNIFICANT CHANGE UP (ref 135–146)
WBC # BLD: 6.53 K/UL — SIGNIFICANT CHANGE UP (ref 4.8–10.8)
WBC # FLD AUTO: 6.53 K/UL — SIGNIFICANT CHANGE UP (ref 4.8–10.8)

## 2025-06-05 PROCEDURE — 99232 SBSQ HOSP IP/OBS MODERATE 35: CPT

## 2025-06-05 RX ORDER — INSULIN GLARGINE-YFGN 100 [IU]/ML
20 INJECTION, SOLUTION SUBCUTANEOUS AT BEDTIME
Refills: 0 | Status: DISCONTINUED | OUTPATIENT
Start: 2025-06-05 | End: 2025-06-07

## 2025-06-05 RX ADMIN — METOPROLOL SUCCINATE 50 MILLIGRAM(S): 50 TABLET, EXTENDED RELEASE ORAL at 21:34

## 2025-06-05 RX ADMIN — Medication 1 APPLICATION(S): at 12:16

## 2025-06-05 RX ADMIN — Medication 2 TABLET(S): at 21:36

## 2025-06-05 RX ADMIN — FUROSEMIDE 80 MILLIGRAM(S): 10 INJECTION INTRAMUSCULAR; INTRAVENOUS at 05:12

## 2025-06-05 RX ADMIN — MUPIROCIN CALCIUM 1 APPLICATION(S): 20 CREAM TOPICAL at 05:12

## 2025-06-05 RX ADMIN — FOLIC ACID 1 MILLIGRAM(S): 1 TABLET ORAL at 12:16

## 2025-06-05 RX ADMIN — INSULIN GLARGINE-YFGN 20 UNIT(S): 100 INJECTION, SOLUTION SUBCUTANEOUS at 21:35

## 2025-06-05 RX ADMIN — CLOPIDOGREL BISULFATE 75 MILLIGRAM(S): 75 TABLET, FILM COATED ORAL at 12:16

## 2025-06-05 RX ADMIN — Medication 81 MILLIGRAM(S): at 12:16

## 2025-06-05 RX ADMIN — Medication 1 DOSE(S): at 22:27

## 2025-06-05 RX ADMIN — SEVELAMER HYDROCHLORIDE 1600 MILLIGRAM(S): 800 TABLET ORAL at 08:05

## 2025-06-05 RX ADMIN — Medication 3 MILLIGRAM(S): at 02:25

## 2025-06-05 RX ADMIN — Medication 5 MILLIGRAM(S): at 05:12

## 2025-06-05 RX ADMIN — MUPIROCIN CALCIUM 1 APPLICATION(S): 20 CREAM TOPICAL at 18:07

## 2025-06-05 RX ADMIN — Medication 5 MILLIGRAM(S): at 18:07

## 2025-06-05 RX ADMIN — Medication 1 DOSE(S): at 08:01

## 2025-06-05 RX ADMIN — SEVELAMER HYDROCHLORIDE 1600 MILLIGRAM(S): 800 TABLET ORAL at 12:19

## 2025-06-05 RX ADMIN — HEPARIN SODIUM 5000 UNIT(S): 1000 INJECTION INTRAVENOUS; SUBCUTANEOUS at 05:13

## 2025-06-05 RX ADMIN — Medication 40 MILLIGRAM(S): at 05:12

## 2025-06-05 RX ADMIN — CYANOCOBALAMIN 1000 MICROGRAM(S): 1000 INJECTION INTRAMUSCULAR; SUBCUTANEOUS at 12:16

## 2025-06-05 RX ADMIN — Medication 30 MILLIGRAM(S): at 05:12

## 2025-06-05 RX ADMIN — Medication 1 DOSE(S): at 06:50

## 2025-06-05 RX ADMIN — INSULIN LISPRO 1: 100 INJECTION, SOLUTION INTRAVENOUS; SUBCUTANEOUS at 11:56

## 2025-06-05 RX ADMIN — ATORVASTATIN CALCIUM 40 MILLIGRAM(S): 80 TABLET, FILM COATED ORAL at 21:34

## 2025-06-05 NOTE — PROGRESS NOTE ADULT - SUBJECTIVE AND OBJECTIVE BOX
SUBJECTIVE/OVERNIGHT EVENTS  Today is hospital day 6d. This morning patient was seen and examined at bedside, resting comfortably in chair. No acute or major events overnight. Pt will be going for HD today for volume overload. Will get CXR on 06/06 to monitor progression of L pleural effusion.        MEDICATIONS  STANDING MEDICATIONS  aspirin  chewable 81 milliGRAM(s) Oral daily  atorvastatin 40 milliGRAM(s) Oral at bedtime  bacitracin   Ointment 1 Application(s) Topical daily  chlorhexidine 2% Cloths 1 Application(s) Topical daily  clopidogrel Tablet 75 milliGRAM(s) Oral daily  cyanocobalamin 1000 MICROGram(s) Oral daily  dextrose 5%. 1000 milliLiter(s) IV Continuous <Continuous>  dextrose 5%. 1000 milliLiter(s) IV Continuous <Continuous>  dextrose 50% Injectable 25 Gram(s) IV Push once  dextrose 50% Injectable 12.5 Gram(s) IV Push once  dextrose 50% Injectable 25 Gram(s) IV Push once  fluticasone propionate/ salmeterol 250-50 MICROgram(s) Diskus 1 Dose(s) Inhalation two times a day  folic acid 1 milliGRAM(s) Oral daily  glucagon  Injectable 1 milliGRAM(s) IntraMuscular once  heparin   Injectable 5000 Unit(s) SubCutaneous every 12 hours  insulin glargine Injectable (LANTUS) 20 Unit(s) SubCutaneous at bedtime  insulin lispro (ADMELOG) corrective regimen sliding scale   SubCutaneous three times a day before meals  metoclopramide 5 milliGRAM(s) Oral two times a day  metoprolol tartrate 50 milliGRAM(s) Oral two times a day  mupirocin 2% Nasal 1 Application(s) Both Nostrils two times a day  NIFEdipine XL 30 milliGRAM(s) Oral daily  pantoprazole    Tablet 40 milliGRAM(s) Oral before breakfast  senna 2 Tablet(s) Oral at bedtime  sevelamer carbonate 1600 milliGRAM(s) Oral three times a day with meals    PRN MEDICATIONS  acetaminophen     Tablet .. 650 milliGRAM(s) Oral every 6 hours PRN  aluminum hydroxide/magnesium hydroxide/simethicone Suspension 30 milliLiter(s) Oral every 4 hours PRN  dextrose Oral Gel 15 Gram(s) Oral once PRN  melatonin 3 milliGRAM(s) Oral at bedtime PRN  ondansetron Injectable 4 milliGRAM(s) IV Push every 8 hours PRN    VITALS  T(F): 97.4 (06-05-25 @ 04:58), Max: 97.4 (06-05-25 @ 04:58)  HR: 62 (06-05-25 @ 04:58) (62 - 77)  BP: 107/58 (06-05-25 @ 04:58) (107/58 - 149/74)  RR: 18 (06-05-25 @ 04:58) (18 - 18)  SpO2: 98% (06-05-25 @ 08:24) (96% - 98%)  POCT Blood Glucose.: 115 mg/dL (06-05-25 @ 09:45)  POCT Blood Glucose.: 67 mg/dL (06-05-25 @ 07:47)  POCT Blood Glucose.: 99 mg/dL (06-04-25 @ 21:36)  POCT Blood Glucose.: 94 mg/dL (06-04-25 @ 16:41)      PHYSICAL EXAM:  GENERAL: NAD, well-groomed, well-developed  HEAD:  Atraumatic, Normocephalic  EYES: EOMI, PERRLA, conjunctiva and sclera clear  ENMT:  Moist mucous membranes  NECK: Supple  CHEST/LUNG: diminished breath sounds on L   HEART: Regular rate and rhythm  ABDOMEN: Soft, Nontender, Nondistended  NEURO:  MENTAL STATUS: AAOx3  LANG/SPEECH: Fluent, intact naming, repetition & comprehension  CRANIAL NERVES:  II: Pupils equal and reactive, no RAPD, normal visual field and fundus  III, IV, VI: EOM intact, no gaze preference or deviation  V: normal  VII: no facial asymmetry  VIII: normal hearing to speech  MOTOR: 5/5 in both upper and lower extremities  REFLEXES: 2/4 throughout  SENSORY: Normal to touch  COORD: Normal finger to nose   Gait:   EXTREMITIES: LE edema  LYMPH: No lymphadenopathy noted  SKIN: No rashes or lesions         LABS             9.5    6.53  )-----------( 218      ( 06-05-25 @ 06:04 )             29.4     136  |  94  |  43  -------------------------<  62   06-05-25 @ 06:04  4.3  |  31  |  4.4    Ca      8.7     06-05-25 @ 06:04    TPro  7.7  /  Alb  3.7  /  TBili  0.2  /  DBili  x   /  AST  13  /  ALT  11  /  AlkPhos  148  /  GGT  x     06-05-25 @ 06:04        Urinalysis Basic - ( 05 Jun 2025 06:04 )    Color: x / Appearance: x / SG: x / pH: x  Gluc: 62 mg/dL / Ketone: x  / Bili: x / Urobili: x   Blood: x / Protein: x / Nitrite: x   Leuk Esterase: x / RBC: x / WBC x   Sq Epi: x / Non Sq Epi: x / Bacteria: x          IMAGING      ASSESSMENT AND PLAN:        #Volume overload:   -Patient with pleural effusion on left, leg edema, scrotal edema  -CXR showed large left pleural effusion, pulmonary congestion.   -Echo in Sept 2024 showed LVEF 58%  -Patient with minimal urine output.   -Fluid restriction <1L day  -HD today  -CXR 06/06 to monitor pleural effusion on L  -D/c lasix 80mg        #ESRD  -Renagel 1600 tid  -HD today     #CAD sp CABG  Continue ASA, Plavix Lipitor.     #DM2  Lantus 40 home dose --> lantus 20mg today.      #DVT: Heparin  #GI: pantoprazole     #Left Superficial Heel Ulcer  Diabetic foot ulcer with Charcot Deformity  PAD:   Foot x ray showed Stable severe Charcot arthropathy with loss of normal plantar arch. Severe midfoot and forefoot degenerative changes  Arterial duplex showed The right posterior tibial artery is occluded. Moderate stenosis of the right dorsalis pedis artery. Left posterior tibial and peroneal arteries are occluded.  Seen by podiatry and Infectious disease, wound care, no need for antibiotics, WBAT.   will see Vascular with Dr. Malik on 06/13    #Progress Note Handoff:  Pending (specify): improving volume overload.   Family discussion:  Disposition: Home.                                                     SUBJECTIVE/OVERNIGHT EVENTS  Today is hospital day 6d. This morning patient was seen and examined at bedside, resting comfortably in chair. No acute or major events overnight. Pt will be going for HD today for volume overload. Will get CXR on 06/06 to monitor progression of L pleural effusion.        MEDICATIONS  STANDING MEDICATIONS  aspirin  chewable 81 milliGRAM(s) Oral daily  atorvastatin 40 milliGRAM(s) Oral at bedtime  bacitracin   Ointment 1 Application(s) Topical daily  chlorhexidine 2% Cloths 1 Application(s) Topical daily  clopidogrel Tablet 75 milliGRAM(s) Oral daily  cyanocobalamin 1000 MICROGram(s) Oral daily  dextrose 5%. 1000 milliLiter(s) IV Continuous <Continuous>  dextrose 5%. 1000 milliLiter(s) IV Continuous <Continuous>  dextrose 50% Injectable 25 Gram(s) IV Push once  dextrose 50% Injectable 12.5 Gram(s) IV Push once  dextrose 50% Injectable 25 Gram(s) IV Push once  fluticasone propionate/ salmeterol 250-50 MICROgram(s) Diskus 1 Dose(s) Inhalation two times a day  folic acid 1 milliGRAM(s) Oral daily  glucagon  Injectable 1 milliGRAM(s) IntraMuscular once  heparin   Injectable 5000 Unit(s) SubCutaneous every 12 hours  insulin glargine Injectable (LANTUS) 20 Unit(s) SubCutaneous at bedtime  insulin lispro (ADMELOG) corrective regimen sliding scale   SubCutaneous three times a day before meals  metoclopramide 5 milliGRAM(s) Oral two times a day  metoprolol tartrate 50 milliGRAM(s) Oral two times a day  mupirocin 2% Nasal 1 Application(s) Both Nostrils two times a day  NIFEdipine XL 30 milliGRAM(s) Oral daily  pantoprazole    Tablet 40 milliGRAM(s) Oral before breakfast  senna 2 Tablet(s) Oral at bedtime  sevelamer carbonate 1600 milliGRAM(s) Oral three times a day with meals    PRN MEDICATIONS  acetaminophen     Tablet .. 650 milliGRAM(s) Oral every 6 hours PRN  aluminum hydroxide/magnesium hydroxide/simethicone Suspension 30 milliLiter(s) Oral every 4 hours PRN  dextrose Oral Gel 15 Gram(s) Oral once PRN  melatonin 3 milliGRAM(s) Oral at bedtime PRN  ondansetron Injectable 4 milliGRAM(s) IV Push every 8 hours PRN    VITALS  T(F): 97.4 (06-05-25 @ 04:58), Max: 97.4 (06-05-25 @ 04:58)  HR: 62 (06-05-25 @ 04:58) (62 - 77)  BP: 107/58 (06-05-25 @ 04:58) (107/58 - 149/74)  RR: 18 (06-05-25 @ 04:58) (18 - 18)  SpO2: 98% (06-05-25 @ 08:24) (96% - 98%)  POCT Blood Glucose.: 115 mg/dL (06-05-25 @ 09:45)  POCT Blood Glucose.: 67 mg/dL (06-05-25 @ 07:47)  POCT Blood Glucose.: 99 mg/dL (06-04-25 @ 21:36)  POCT Blood Glucose.: 94 mg/dL (06-04-25 @ 16:41)      PHYSICAL EXAM:  GENERAL: NAD, well-groomed, well-developed  HEAD:  Atraumatic, Normocephalic  EYES: EOMI, PERRLA, conjunctiva and sclera clear  ENMT:  Moist mucous membranes  NECK: Supple  CHEST/LUNG: diminished breath sounds on L   HEART: Regular rate and rhythm  ABDOMEN: Soft, Nontender, Nondistended  NEURO:  MENTAL STATUS: AAOx3  LANG/SPEECH: Fluent, intact naming, repetition & comprehension  CRANIAL NERVES:  II: Pupils equal and reactive, no RAPD, normal visual field and fundus  III, IV, VI: EOM intact, no gaze preference or deviation  V: normal  VII: no facial asymmetry  VIII: normal hearing to speech  MOTOR: 5/5 in both upper and lower extremities  REFLEXES: 2/4 throughout  SENSORY: Normal to touch  COORD: Normal finger to nose   Gait:   EXTREMITIES: LE edema  LYMPH: No lymphadenopathy noted  SKIN: No rashes or lesions         LABS             9.5    6.53  )-----------( 218      ( 06-05-25 @ 06:04 )             29.4     136  |  94  |  43  -------------------------<  62   06-05-25 @ 06:04  4.3  |  31  |  4.4    Ca      8.7     06-05-25 @ 06:04    TPro  7.7  /  Alb  3.7  /  TBili  0.2  /  DBili  x   /  AST  13  /  ALT  11  /  AlkPhos  148  /  GGT  x     06-05-25 @ 06:04        Urinalysis Basic - ( 05 Jun 2025 06:04 )    Color: x / Appearance: x / SG: x / pH: x  Gluc: 62 mg/dL / Ketone: x  / Bili: x / Urobili: x   Blood: x / Protein: x / Nitrite: x   Leuk Esterase: x / RBC: x / WBC x   Sq Epi: x / Non Sq Epi: x / Bacteria: x          IMAGING  CXR: Basilar opacities/effusions, left greater than right, unchanged. (06.03.25 @ 05:45)        ASSESSMENT AND PLAN:        #Volume overload:   -Patient with pleural effusion on left, leg edema, scrotal edema  -CXR showed large left pleural effusion, pulmonary congestion.   -Echo in Sept 2024 showed LVEF 58%  -Patient with minimal urine output.   -Fluid restriction <1L day  -HD today  -CXR 06/06 to monitor pleural effusion on L  -D/c lasix 80mg        #ESRD  -Renagel 1600 tid  -HD today     #CAD sp CABG  Continue ASA, Plavix Lipitor.     #DM2  Lantus 40 home dose --> lantus 20mg today.      #DVT: Heparin  #GI: pantoprazole     #Left Superficial Heel Ulcer  Diabetic foot ulcer with Charcot Deformity  PAD:   Foot x ray showed Stable severe Charcot arthropathy with loss of normal plantar arch. Severe midfoot and forefoot degenerative changes  Arterial duplex showed The right posterior tibial artery is occluded. Moderate stenosis of the right dorsalis pedis artery. Left posterior tibial and peroneal arteries are occluded.  Seen by podiatry and Infectious disease, wound care, no need for antibiotics, WBAT.   will see Vascular with Dr. Malik on 06/13    #Progress Note Handoff:  Pending (specify): improving volume overload.   Family discussion:  Disposition: Home.

## 2025-06-05 NOTE — PROGRESS NOTE ADULT - ASSESSMENT
65-year-old male with a past medical history of end-stage renal disease on hemodialysis Monday Wednesday Friday via left arm AV fistula, diabetes, BPH, CAD status post CABG, hypertension, CHF, TIA, peripheral arterial disease, history of left toe ulcers status post amputations presents to the ED for evaluation abrasion to the left heel which she sustained about an hour ago which has now stopped bleeding.  Patient is on aspirin and Plavix.  Patient denies fever, chills, weakness, or recent trauma.    A/P:   Volume overload:   Patient with pleural effusion on left, leg edema, scrotal edema  CXR showed large left pleural effusion, pulmonary congestion.   Echo in Sept 2024 showed LVEF 58%  Patient with minimal urine output.   Fluid restriction <1L day, continue fluid removal during HD.     Left Superficial Heel Ulcer  Diabetic foot ulcer with Charcot Deformity  PAD:   Foot x ray showed Stable severe Charcot arthropathy with loss of normal plantar arch. Severe midfoot and forefoot degenerative changes  Arterial duplex showed The right posterior tibial artery is occluded. Moderate stenosis of the right dorsalis pedis artery. Left posterior tibial and peroneal arteries are occluded.  Seen by podiatry and Infectious disease, wound care, no need for antibiotics, WBAT.   Follow up with Vascular outpatient.     ESRD  Renagel 1600 tid  Nephrology follow up.     CAD sp CABG  Continue ASA, Plavix Lipitor.     DM2, controlled, episode of hypoglycemia  FS was low, reduce Lantus to 20 units daily.     DVT: Heparin  #GI: pantoprazole     #Progress Note Handoff:  Pending (specify): improving volume overload.   Family discussion:  Disposition: Home.

## 2025-06-05 NOTE — PROGRESS NOTE ADULT - ASSESSMENT
ESRD on HD MWF at [facility]  L heel ulcer  DM  BPH  CAD status post CABG  Hypertension  TIA  Peripheral arterial disease    Plan:    HD today for volume control: 2 hours, opti 160 dialyzer, 2K bath, 2L UF  Renally dose antibiotics  Sevelamer with meals  Renal diet  Fluid restriction

## 2025-06-05 NOTE — PROGRESS NOTE ADULT - SUBJECTIVE AND OBJECTIVE BOX
[facility] FOLLOW UP NOTE  --------------------------------------------------------------------------------  24 hour events/subjective: Patient examined. Appears comfortable.    PAST HISTORY  --------------------------------------------------------------------------------  No significant changes to PMH, PSH, FHx, SHx, unless otherwise noted    ALLERGIES & MEDICATIONS  --------------------------------------------------------------------------------  Allergies    No Known Allergies    Standing Inpatient Medications  aspirin  chewable 81 milliGRAM(s) Oral daily  atorvastatin 40 milliGRAM(s) Oral at bedtime  bacitracin   Ointment 1 Application(s) Topical daily  chlorhexidine 2% Cloths 1 Application(s) Topical daily  clopidogrel Tablet 75 milliGRAM(s) Oral daily  cyanocobalamin 1000 MICROGram(s) Oral daily  dextrose 5%. 1000 milliLiter(s) IV Continuous <Continuous>  dextrose 5%. 1000 milliLiter(s) IV Continuous <Continuous>  dextrose 50% Injectable 25 Gram(s) IV Push once  dextrose 50% Injectable 12.5 Gram(s) IV Push once  dextrose 50% Injectable 25 Gram(s) IV Push once  fluticasone propionate/ salmeterol 250-50 MICROgram(s) Diskus 1 Dose(s) Inhalation two times a day  folic acid 1 milliGRAM(s) Oral daily  glucagon  Injectable 1 milliGRAM(s) IntraMuscular once  heparin   Injectable 5000 Unit(s) SubCutaneous every 12 hours  insulin glargine Injectable (LANTUS) 20 Unit(s) SubCutaneous at bedtime  insulin lispro (ADMELOG) corrective regimen sliding scale   SubCutaneous three times a day before meals  metoclopramide 5 milliGRAM(s) Oral two times a day  metoprolol tartrate 50 milliGRAM(s) Oral two times a day  mupirocin 2% Nasal 1 Application(s) Both Nostrils two times a day  NIFEdipine XL 30 milliGRAM(s) Oral daily  pantoprazole    Tablet 40 milliGRAM(s) Oral before breakfast  senna 2 Tablet(s) Oral at bedtime  sevelamer carbonate 1600 milliGRAM(s) Oral three times a day with meals    PRN Inpatient Medications  acetaminophen     Tablet .. 650 milliGRAM(s) Oral every 6 hours PRN  aluminum hydroxide/magnesium hydroxide/simethicone Suspension 30 milliLiter(s) Oral every 4 hours PRN  dextrose Oral Gel 15 Gram(s) Oral once PRN  melatonin 3 milliGRAM(s) Oral at bedtime PRN  ondansetron Injectable 4 milliGRAM(s) IV Push every 8 hours PRN    VITALS/PHYSICAL EXAM  --------------------------------------------------------------------------------  T(C): 36.3 (06-05-25 @ 12:15), Max: 36.3 (06-04-25 @ 19:34)  HR: 76 (06-05-25 @ 15:30) (62 - 76)  BP: 149/58 (06-05-25 @ 15:30) (106/64 - 149/58)  RR: 18 (06-05-25 @ 15:30) (18 - 18)  SpO2: 98% (06-05-25 @ 08:24) (96% - 98%)    06-04-25 @ 07:01  -  06-05-25 @ 07:00  --------------------------------------------------------  IN: 240 mL / OUT: 3500 mL / NET: -3260 mL    06-05-25 @ 07:01  -  06-05-25 @ 17:03  --------------------------------------------------------  IN: 480 mL / OUT: 0 mL / NET: 480 mL    Physical Exam:  	Gen: NAD  	Pulm: CTA B/L  	CV: RRR, S1S2  	Abd: +BS, soft, nontender/nondistended  	: No suprapubic tenderness  	LE: Warm, no edema  	Vascular access: AVF    LABS/STUDIES  --------------------------------------------------------------------------------              9.5    6.53  >-----------<  218      [06-05-25 @ 06:04]              29.4     136  |  94  |  43  ----------------------------<  62      [06-05-25 @ 06:04]  4.3   |  31  |  4.4        Ca     8.7     [06-05-25 @ 06:04]    TPro  7.7  /  Alb  3.7  /  TBili  0.2  /  DBili  x   /  AST  13  /  ALT  11  /  AlkPhos  148  [06-05-25 @ 06:04]    Creatinine Trend:  SCr 4.4 [06-05 @ 06:04]  SCr 5.3 [06-04 @ 06:59]  SCr 4.4 [06-03 @ 06:01]  SCr 5.4 [06-02 @ 06:24]  SCr 5.2 [06-01 @ 21:46]    Urinalysis - [06-05-25 @ 06:04]      Color  / Appearance  / SG  / pH       Gluc 62 / Ketone   / Bili  / Urobili        Blood  / Protein  / Leuk Est  / Nitrite       RBC  / WBC  / Hyaline  / Gran  / Sq Epi  / Non Sq Epi  / Bacteria     Iron 93, TIBC --, %sat --      [10-01-24 @ 07:00]  Ferritin 1129      [10-01-24 @ 07:00]  PTH -- (Ca 7.8)      [09-24-24 @ 06:56]   135  Vitamin D (25OH) 15      [09-25-24 @ 06:49]  Lipid: chol 81, TG 52, HDL 43, LDL --      [06-02-25 @ 06:24]

## 2025-06-05 NOTE — PROGRESS NOTE ADULT - SUBJECTIVE AND OBJECTIVE BOX
SCHWABACHER, LAWRENCE  65y  Male      Patient is a 65y old  Male who presents with a chief complaint of L Heel Ulcer (03 Jun 2025 14:24)      INTERVAL HPI/OVERNIGHT EVENTS:  He feels ok, no chest pain or fever.   Vital Signs Last 24 Hrs  T(C): 36.3 (05 Jun 2025 12:15), Max: 36.3 (04 Jun 2025 19:34)  T(F): 97.4 (05 Jun 2025 12:15), Max: 97.4 (05 Jun 2025 04:58)  HR: 76 (05 Jun 2025 15:30) (62 - 76)  BP: 149/58 (05 Jun 2025 15:30) (106/64 - 149/58)  BP(mean): --  RR: 18 (05 Jun 2025 15:30) (18 - 18)  SpO2: 98% (05 Jun 2025 08:24) (96% - 98%)    Parameters below as of 05 Jun 2025 15:30  Patient On (Oxygen Delivery Method): room air          06-04-25 @ 07:01  -  06-05-25 @ 07:00  --------------------------------------------------------  IN: 240 mL / OUT: 3500 mL / NET: -3260 mL    06-05-25 @ 07:01  -  06-05-25 @ 16:50  --------------------------------------------------------  IN: 480 mL / OUT: 0 mL / NET: 480 mL            Consultant(s) Notes Reviewed:  [x ] YES  [ ] NO          MEDICATIONS  (STANDING):  aspirin  chewable 81 milliGRAM(s) Oral daily  atorvastatin 40 milliGRAM(s) Oral at bedtime  bacitracin   Ointment 1 Application(s) Topical daily  chlorhexidine 2% Cloths 1 Application(s) Topical daily  clopidogrel Tablet 75 milliGRAM(s) Oral daily  cyanocobalamin 1000 MICROGram(s) Oral daily  dextrose 5%. 1000 milliLiter(s) (100 mL/Hr) IV Continuous <Continuous>  dextrose 5%. 1000 milliLiter(s) (50 mL/Hr) IV Continuous <Continuous>  dextrose 50% Injectable 25 Gram(s) IV Push once  dextrose 50% Injectable 12.5 Gram(s) IV Push once  dextrose 50% Injectable 25 Gram(s) IV Push once  fluticasone propionate/ salmeterol 250-50 MICROgram(s) Diskus 1 Dose(s) Inhalation two times a day  folic acid 1 milliGRAM(s) Oral daily  glucagon  Injectable 1 milliGRAM(s) IntraMuscular once  heparin   Injectable 5000 Unit(s) SubCutaneous every 12 hours  insulin glargine Injectable (LANTUS) 20 Unit(s) SubCutaneous at bedtime  insulin lispro (ADMELOG) corrective regimen sliding scale   SubCutaneous three times a day before meals  metoclopramide 5 milliGRAM(s) Oral two times a day  metoprolol tartrate 50 milliGRAM(s) Oral two times a day  mupirocin 2% Nasal 1 Application(s) Both Nostrils two times a day  NIFEdipine XL 30 milliGRAM(s) Oral daily  pantoprazole    Tablet 40 milliGRAM(s) Oral before breakfast  senna 2 Tablet(s) Oral at bedtime  sevelamer carbonate 1600 milliGRAM(s) Oral three times a day with meals    MEDICATIONS  (PRN):  acetaminophen     Tablet .. 650 milliGRAM(s) Oral every 6 hours PRN Temp greater or equal to 38C (100.4F), Mild Pain (1 - 3)  aluminum hydroxide/magnesium hydroxide/simethicone Suspension 30 milliLiter(s) Oral every 4 hours PRN Dyspepsia  dextrose Oral Gel 15 Gram(s) Oral once PRN Blood Glucose LESS THAN 70 milliGRAM(s)/deciliter  melatonin 3 milliGRAM(s) Oral at bedtime PRN Insomnia  ondansetron Injectable 4 milliGRAM(s) IV Push every 8 hours PRN Nausea and/or Vomiting      LABS                          9.5    6.53  )-----------( 218      ( 05 Jun 2025 06:04 )             29.4     06-05    136  |  94[L]  |  43[H]  ----------------------------<  62[L]  4.3   |  31  |  4.4[HH]    Ca    8.7      05 Jun 2025 06:04    TPro  7.7  /  Alb  3.7  /  TBili  0.2  /  DBili  x   /  AST  13  /  ALT  11  /  AlkPhos  148[H]  06-05      Urinalysis Basic - ( 05 Jun 2025 06:04 )    Color: x / Appearance: x / SG: x / pH: x  Gluc: 62 mg/dL / Ketone: x  / Bili: x / Urobili: x   Blood: x / Protein: x / Nitrite: x   Leuk Esterase: x / RBC: x / WBC x   Sq Epi: x / Non Sq Epi: x / Bacteria: x        Lactate Trend        CAPILLARY BLOOD GLUCOSE      POCT Blood Glucose.: 181 mg/dL (05 Jun 2025 11:50)        RADIOLOGY & ADDITIONAL TESTS:    Imaging Personally Reviewed:  [ ] YES  [ ] NO    HEALTH ISSUES - PROBLEM Dx:          PHYSICAL EXAM:  GENERAL: NAD, well-developed.  HEAD:  Atraumatic, Normocephalic.  EYES: EOMI, PERRLA, conjunctiva and sclera clear.  NECK: Supple, No JVD.  CHEST/LUNG: decrease breath sounds on left lower lung  HEART: Regular rate and rhythm; S1 S2.   ABDOMEN: Soft, Nontender, Nondistended; Bowel sounds present.  EXTREMITIES:  left foot heel ulcer, no drainage. Lower leg edema L>R 3+

## 2025-06-06 LAB
ALBUMIN SERPL ELPH-MCNC: 3.6 G/DL — SIGNIFICANT CHANGE UP (ref 3.5–5.2)
ALP SERPL-CCNC: 144 U/L — HIGH (ref 30–115)
ALT FLD-CCNC: 10 U/L — SIGNIFICANT CHANGE UP (ref 0–41)
ANION GAP SERPL CALC-SCNC: 11 MMOL/L — SIGNIFICANT CHANGE UP (ref 7–14)
AST SERPL-CCNC: 12 U/L — SIGNIFICANT CHANGE UP (ref 0–41)
BASOPHILS # BLD AUTO: 0.06 K/UL — SIGNIFICANT CHANGE UP (ref 0–0.2)
BASOPHILS NFR BLD AUTO: 0.9 % — SIGNIFICANT CHANGE UP (ref 0–1)
BILIRUB SERPL-MCNC: 0.2 MG/DL — SIGNIFICANT CHANGE UP (ref 0.2–1.2)
BUN SERPL-MCNC: 38 MG/DL — HIGH (ref 10–20)
CALCIUM SERPL-MCNC: 8.8 MG/DL — SIGNIFICANT CHANGE UP (ref 8.4–10.5)
CHLORIDE SERPL-SCNC: 98 MMOL/L — SIGNIFICANT CHANGE UP (ref 98–110)
CO2 SERPL-SCNC: 30 MMOL/L — SIGNIFICANT CHANGE UP (ref 17–32)
CREAT SERPL-MCNC: 4.1 MG/DL — CRITICAL HIGH (ref 0.7–1.5)
EGFR: 15 ML/MIN/1.73M2 — LOW
EGFR: 15 ML/MIN/1.73M2 — LOW
EOSINOPHIL # BLD AUTO: 0.29 K/UL — SIGNIFICANT CHANGE UP (ref 0–0.7)
EOSINOPHIL NFR BLD AUTO: 4.5 % — SIGNIFICANT CHANGE UP (ref 0–8)
GLUCOSE BLDC GLUCOMTR-MCNC: 132 MG/DL — HIGH (ref 70–99)
GLUCOSE BLDC GLUCOMTR-MCNC: 136 MG/DL — HIGH (ref 70–99)
GLUCOSE BLDC GLUCOMTR-MCNC: 62 MG/DL — LOW (ref 70–99)
GLUCOSE BLDC GLUCOMTR-MCNC: 99 MG/DL — SIGNIFICANT CHANGE UP (ref 70–99)
GLUCOSE SERPL-MCNC: 52 MG/DL — CRITICAL LOW (ref 70–99)
HCT VFR BLD CALC: 29.5 % — LOW (ref 42–52)
HGB BLD-MCNC: 9.8 G/DL — LOW (ref 14–18)
IMM GRANULOCYTES NFR BLD AUTO: 0.3 % — SIGNIFICANT CHANGE UP (ref 0.1–0.3)
LYMPHOCYTES # BLD AUTO: 0.68 K/UL — LOW (ref 1.2–3.4)
LYMPHOCYTES # BLD AUTO: 10.6 % — LOW (ref 20.5–51.1)
MCHC RBC-ENTMCNC: 33.2 G/DL — SIGNIFICANT CHANGE UP (ref 32–37)
MCHC RBC-ENTMCNC: 33.8 PG — HIGH (ref 27–31)
MCV RBC AUTO: 101.7 FL — HIGH (ref 80–94)
MONOCYTES # BLD AUTO: 0.75 K/UL — HIGH (ref 0.1–0.6)
MONOCYTES NFR BLD AUTO: 11.7 % — HIGH (ref 1.7–9.3)
NEUTROPHILS # BLD AUTO: 4.61 K/UL — SIGNIFICANT CHANGE UP (ref 1.4–6.5)
NEUTROPHILS NFR BLD AUTO: 72 % — SIGNIFICANT CHANGE UP (ref 42.2–75.2)
NRBC BLD AUTO-RTO: 0 /100 WBCS — SIGNIFICANT CHANGE UP (ref 0–0)
PLATELET # BLD AUTO: 202 K/UL — SIGNIFICANT CHANGE UP (ref 130–400)
PMV BLD: 11.4 FL — HIGH (ref 7.4–10.4)
POTASSIUM SERPL-MCNC: 4.1 MMOL/L — SIGNIFICANT CHANGE UP (ref 3.5–5)
POTASSIUM SERPL-SCNC: 4.1 MMOL/L — SIGNIFICANT CHANGE UP (ref 3.5–5)
PROT SERPL-MCNC: 7.4 G/DL — SIGNIFICANT CHANGE UP (ref 6–8)
RBC # BLD: 2.9 M/UL — LOW (ref 4.7–6.1)
RBC # FLD: 13.4 % — SIGNIFICANT CHANGE UP (ref 11.5–14.5)
SODIUM SERPL-SCNC: 139 MMOL/L — SIGNIFICANT CHANGE UP (ref 135–146)
WBC # BLD: 6.41 K/UL — SIGNIFICANT CHANGE UP (ref 4.8–10.8)
WBC # FLD AUTO: 6.41 K/UL — SIGNIFICANT CHANGE UP (ref 4.8–10.8)

## 2025-06-06 PROCEDURE — 99232 SBSQ HOSP IP/OBS MODERATE 35: CPT

## 2025-06-06 PROCEDURE — 71045 X-RAY EXAM CHEST 1 VIEW: CPT | Mod: 26

## 2025-06-06 RX ORDER — FUROSEMIDE 10 MG/ML
1 INJECTION INTRAMUSCULAR; INTRAVENOUS
Refills: 0 | DISCHARGE

## 2025-06-06 RX ORDER — NIFEDIPINE 30 MG
30 TABLET, EXTENDED RELEASE 24 HR ORAL
Refills: 0 | DISCHARGE

## 2025-06-06 RX ORDER — NIFEDIPINE 30 MG
1 TABLET, EXTENDED RELEASE 24 HR ORAL
Qty: 0 | Refills: 0 | DISCHARGE
Start: 2025-06-06

## 2025-06-06 RX ORDER — SEVELAMER HYDROCHLORIDE 800 MG/1
2 TABLET ORAL
Qty: 0 | Refills: 0 | DISCHARGE
Start: 2025-06-06

## 2025-06-06 RX ADMIN — SEVELAMER HYDROCHLORIDE 1600 MILLIGRAM(S): 800 TABLET ORAL at 11:48

## 2025-06-06 RX ADMIN — Medication 1 APPLICATION(S): at 11:51

## 2025-06-06 RX ADMIN — Medication 30 MILLIGRAM(S): at 05:08

## 2025-06-06 RX ADMIN — HEPARIN SODIUM 5000 UNIT(S): 1000 INJECTION INTRAVENOUS; SUBCUTANEOUS at 05:07

## 2025-06-06 RX ADMIN — Medication 1 APPLICATION(S): at 11:49

## 2025-06-06 RX ADMIN — HEPARIN SODIUM 5000 UNIT(S): 1000 INJECTION INTRAVENOUS; SUBCUTANEOUS at 17:04

## 2025-06-06 RX ADMIN — ATORVASTATIN CALCIUM 40 MILLIGRAM(S): 80 TABLET, FILM COATED ORAL at 22:02

## 2025-06-06 RX ADMIN — MUPIROCIN CALCIUM 1 APPLICATION(S): 20 CREAM TOPICAL at 17:05

## 2025-06-06 RX ADMIN — Medication 5 MILLIGRAM(S): at 05:08

## 2025-06-06 RX ADMIN — Medication 5 MILLIGRAM(S): at 17:04

## 2025-06-06 RX ADMIN — INSULIN GLARGINE-YFGN 20 UNIT(S): 100 INJECTION, SOLUTION SUBCUTANEOUS at 22:44

## 2025-06-06 RX ADMIN — Medication 1 DOSE(S): at 07:55

## 2025-06-06 RX ADMIN — METOPROLOL SUCCINATE 50 MILLIGRAM(S): 50 TABLET, EXTENDED RELEASE ORAL at 17:04

## 2025-06-06 RX ADMIN — Medication 40 MILLIGRAM(S): at 05:08

## 2025-06-06 RX ADMIN — FOLIC ACID 1 MILLIGRAM(S): 1 TABLET ORAL at 11:48

## 2025-06-06 RX ADMIN — MUPIROCIN CALCIUM 1 APPLICATION(S): 20 CREAM TOPICAL at 05:08

## 2025-06-06 RX ADMIN — CLOPIDOGREL BISULFATE 75 MILLIGRAM(S): 75 TABLET, FILM COATED ORAL at 11:48

## 2025-06-06 RX ADMIN — SEVELAMER HYDROCHLORIDE 1600 MILLIGRAM(S): 800 TABLET ORAL at 17:05

## 2025-06-06 RX ADMIN — CYANOCOBALAMIN 1000 MICROGRAM(S): 1000 INJECTION INTRAMUSCULAR; SUBCUTANEOUS at 11:48

## 2025-06-06 RX ADMIN — SEVELAMER HYDROCHLORIDE 1600 MILLIGRAM(S): 800 TABLET ORAL at 07:54

## 2025-06-06 RX ADMIN — Medication 81 MILLIGRAM(S): at 11:48

## 2025-06-06 RX ADMIN — METOPROLOL SUCCINATE 50 MILLIGRAM(S): 50 TABLET, EXTENDED RELEASE ORAL at 05:08

## 2025-06-06 RX ADMIN — Medication 2 TABLET(S): at 22:02

## 2025-06-06 NOTE — PROGRESS NOTE ADULT - ASSESSMENT
65-year-old male with a past medical history of end-stage renal disease on hemodialysis Monday Wednesday Friday via left arm AV fistula, diabetes, BPH, CAD status post CABG, hypertension, CHF, TIA, peripheral arterial disease, history of left toe ulcers status post amputations presents to the ED for evaluation abrasion to the left heel which she sustained about an hour ago which has now stopped bleeding.  Patient is on aspirin and Plavix.  Patient denies fever, chills, weakness, or recent trauma.    A/P:   Volume overload:   Patient with pleural effusion on left, leg edema, scrotal edema  CXR showed large left pleural effusion, pulmonary congestion.   Echo in Sept 2024 showed LVEF 58%  Patient with minimal urine output.   Fluid restriction <1L day, continue fluid removal during HD.     Left Superficial Heel Ulcer  Diabetic foot ulcer with Charcot Deformity  PAD:   Foot x ray showed Stable severe Charcot arthropathy with loss of normal plantar arch. Severe midfoot and forefoot degenerative changes  Arterial duplex showed The right posterior tibial artery is occluded. Moderate stenosis of the right dorsalis pedis artery. Left posterior tibial and peroneal arteries are occluded.  Seen by podiatry and Infectious disease, wound care, no need for antibiotics, WBAT.   Follow up with Vascular outpatient.     ESRD  Renagel 1600 tid  Nephrology follow up.     CAD sp CABG  Continue ASA, Plavix Lipitor.     DM2, controlled, episode of hypoglycemia  FS was low, reduce Lantus to 20 units daily.     DVT: Heparin  #GI: pantoprazole     #Progress Note Handoff:  Pending (specify): improving volume overload.   Family discussion:  Disposition: Home. 65-year-old male with a past medical history of end-stage renal disease on hemodialysis Monday Wednesday Friday via left arm AV fistula, diabetes, BPH, CAD status post CABG, hypertension, CHF, TIA, peripheral arterial disease, history of left toe ulcers status post amputations presents to the ED for evaluation abrasion to the left heel which she sustained about an hour ago which has now stopped bleeding.  Patient is on aspirin and Plavix.  Patient denies fever, chills, weakness, or recent trauma.    Volume overload: improving   ESRD on HD  Patient with pleural effusion on left, leg edema, scrotal edema  CXR showed large left pleural effusion, pulmonary congestion.   Echo in Sept 2024 showed LVEF 58%  Patient with minimal urine output.   Fluid restriction <1L day, continue fluid removal during HD.   for HD again today     Left Superficial Heel Ulcer  Diabetic foot ulcer with Charcot Deformity  PAD:   Foot x ray showed Stable severe Charcot arthropathy with loss of normal plantar arch. Severe midfoot and forefoot degenerative changes  Arterial duplex showed The right posterior tibial artery is occluded. Moderate stenosis of the right dorsalis pedis artery. Left posterior tibial and peroneal arteries are occluded.  Seen by podiatry and Infectious disease, wound care, no need for antibiotics, WBAT.   Follow up with Vascular outpatient.     CAD sp CABG  Continue ASA, Plavix Lipitor.     DM2, controlled, episode of  AM hypoglycemia  FS was low, reduce Lantus to 15 units daily.     DVT: Heparin  #GI: pantoprazole     #Progress Note Handoff:  Pending (specify): improving volume overload and improvement in hypoglycemia    Disposition:From [facility], per [facility], will not accept pt on weekend, anticipate for monday. Can dc tele     Patient seen at bedside, total time spent to evaluate and treat the patient's acute illness and chronic medical conditions as well as time spent reviewing prior records, labs, radiology, documenting in electronic medical records,  discussing medical plan with   medical team was 45 minutes with >50% of time spent face to face with patient, discussing with patient/family as well as coordination of care

## 2025-06-06 NOTE — PROGRESS NOTE ADULT - SUBJECTIVE AND OBJECTIVE BOX
[facility] FOLLOW UP NOTE  --------------------------------------------------------------------------------  24 hour events/subjective: Patient examined during HD. Appears comfortable.    PAST HISTORY  --------------------------------------------------------------------------------  No significant changes to PMH, PSH, FHx, SHx, unless otherwise noted    ALLERGIES & MEDICATIONS  --------------------------------------------------------------------------------  Allergies    No Known Allergies    Standing Inpatient Medications  aspirin  chewable 81 milliGRAM(s) Oral daily  atorvastatin 40 milliGRAM(s) Oral at bedtime  bacitracin   Ointment 1 Application(s) Topical daily  chlorhexidine 2% Cloths 1 Application(s) Topical daily  clopidogrel Tablet 75 milliGRAM(s) Oral daily  cyanocobalamin 1000 MICROGram(s) Oral daily  dextrose 5%. 1000 milliLiter(s) IV Continuous <Continuous>  dextrose 5%. 1000 milliLiter(s) IV Continuous <Continuous>  dextrose 50% Injectable 25 Gram(s) IV Push once  dextrose 50% Injectable 12.5 Gram(s) IV Push once  dextrose 50% Injectable 25 Gram(s) IV Push once  fluticasone propionate/ salmeterol 250-50 MICROgram(s) Diskus 1 Dose(s) Inhalation two times a day  folic acid 1 milliGRAM(s) Oral daily  glucagon  Injectable 1 milliGRAM(s) IntraMuscular once  heparin   Injectable 5000 Unit(s) SubCutaneous every 12 hours  insulin glargine Injectable (LANTUS) 20 Unit(s) SubCutaneous at bedtime  insulin lispro (ADMELOG) corrective regimen sliding scale   SubCutaneous three times a day before meals  metoclopramide 5 milliGRAM(s) Oral two times a day  metoprolol tartrate 50 milliGRAM(s) Oral two times a day  mupirocin 2% Nasal 1 Application(s) Both Nostrils two times a day  NIFEdipine XL 30 milliGRAM(s) Oral daily  pantoprazole    Tablet 40 milliGRAM(s) Oral before breakfast  senna 2 Tablet(s) Oral at bedtime  sevelamer carbonate 1600 milliGRAM(s) Oral three times a day with meals    PRN Inpatient Medications  acetaminophen     Tablet .. 650 milliGRAM(s) Oral every 6 hours PRN  aluminum hydroxide/magnesium hydroxide/simethicone Suspension 30 milliLiter(s) Oral every 4 hours PRN  dextrose Oral Gel 15 Gram(s) Oral once PRN  melatonin 3 milliGRAM(s) Oral at bedtime PRN  ondansetron Injectable 4 milliGRAM(s) IV Push every 8 hours PRN    VITALS/PHYSICAL EXAM  --------------------------------------------------------------------------------  T(C): 36.7 (06-06-25 @ 11:45), Max: 36.7 (06-06-25 @ 04:23)  HR: 64 (06-06-25 @ 12:10) (62 - 80)  BP: 147/76 (06-06-25 @ 12:10) (120/69 - 154/75)  RR: 18 (06-06-25 @ 12:10) (16 - 18)  SpO2: 93% (06-06-25 @ 08:20) (93% - 99%)    06-05-25 @ 07:01  -  06-06-25 @ 07:00  --------------------------------------------------------  IN: 480 mL / OUT: 2000 mL / NET: -1520 mL    06-06-25 @ 07:01  -  06-06-25 @ 13:09  --------------------------------------------------------  IN: 360 mL / OUT: 0 mL / NET: 360 mL    Physical Exam:  	Gen: NAD  	Pulm: CTA B/L  	CV: RRR, S1S2  	Abd: +BS, soft, nontender/nondistended  	: No suprapubic tenderness  	LE: Warm, no edema  	Vascular access:  AVF    LABS/STUDIES  --------------------------------------------------------------------------------              9.8    6.41  >-----------<  202      [06-06-25 @ 06:56]              29.5     139  |  98  |  38  ----------------------------<  52      [06-06-25 @ 06:56]  4.1   |  30  |  4.1        Ca     8.8     [06-06-25 @ 06:56]    TPro  7.4  /  Alb  3.6  /  TBili  0.2  /  DBili  x   /  AST  12  /  ALT  10  /  AlkPhos  144  [06-06-25 @ 06:56]    Creatinine Trend:  SCr 4.1 [06-06 @ 06:56]  SCr 4.4 [06-05 @ 06:04]  SCr 5.3 [06-04 @ 06:59]  SCr 4.4 [06-03 @ 06:01]  SCr 5.4 [06-02 @ 06:24]    Urinalysis - [06-06-25 @ 06:56]      Color  / Appearance  / SG  / pH       Gluc 52 / Ketone   / Bili  / Urobili        Blood  / Protein  / Leuk Est  / Nitrite       RBC  / WBC  / Hyaline  / Gran  / Sq Epi  / Non Sq Epi  / Bacteria     Iron 93, TIBC --, %sat --      [10-01-24 @ 07:00]  Ferritin 1129      [10-01-24 @ 07:00]  PTH -- (Ca 7.8)      [09-24-24 @ 06:56]   135  Vitamin D (25OH) 15      [09-25-24 @ 06:49]  Lipid: chol 81, TG 52, HDL 43, LDL --      [06-02-25 @ 06:24]

## 2025-06-06 NOTE — PROGRESS NOTE ADULT - ASSESSMENT
ESRD on HD MWF at [facility]  L heel ulcer  DM  BPH  CAD status post CABG  Hypertension  TIA  Peripheral arterial disease    Plan:    HD today: 3 hours, opti 160 dialyzer, 2K bath, 3.5L UF  Renally dose antibiotics  Sevelamer with meals  Renal diet  Fluid restriction

## 2025-06-06 NOTE — PROGRESS NOTE ADULT - SUBJECTIVE AND OBJECTIVE BOX
SCHWABACHER, LAWRENCE  65y Male    CHIEF COMPLAINT:    Patient is a 65y old  Male who presents with a chief complaint of Volume Overload (2025 12:47)    INTERVAL HPI/OVERNIGHT EVENTS:    Patient seen and examined. No acute events overnight. No active complaints, comfortable on room air, hypoglycemic in AM     ROS: All other systems are negative.    Vital Signs:    T(F): 98 (25 @ 11:45), Max: 98.1 (25 @ 04:23)  HR: 64 (25 @ 12:10) (62 - 80)  BP: 147/76 (25 @ 12:10) (120/69 - 154/75)  RR: 18 (25 @ 12:10) (16 - 18)  SpO2: 93% (25 @ 08:20) (93% - 99%)    2025 07:01  -  2025 07:00  --------------------------------------------------------  IN: 480 mL / OUT: 2000 mL / NET: -1520 mL    2025 07:01  -  2025 14:40  --------------------------------------------------------  IN: 360 mL / OUT: 0 mL / NET: 360 mL      Daily Weight in k (2025 19:41)    POCT Blood Glucose.: 132 mg/dL (2025 11:45)  POCT Blood Glucose.: 62 mg/dL (2025 07:42)  POCT Blood Glucose.: 129 mg/dL (2025 20:50)    PHYSICAL EXAM:    GENERAL:  NAD pale   SKIN: No rashes or lesions  HEENT: Atraumatic. Normocephalic.    NECK: Supple, No JVD.    PULMONARY: CTA B/L. No wheezing.    CVS: Normal S1, S2. Rate and Rhythm are regular.    ABDOMEN/GI: Soft, Nontender, Nondistended   MSK:  No clubbing or cyanosis   NEUROLOGIC: moves all extremi ties   PSYCH: Awake and alert     Consultant(s) Notes Reviewed:  [x ] YES  [ ] NO  Care Discussed with Consultants/Other Providers [ x] YES  [ ] NO    LABS:                        9.8    6.41  )-----------( 202      ( 2025 06:56 )             29.5     139  |  98  |  38[H]  ----------------------------<  52[LL]  4.1   |  30  |  4.1[HH]    Ca    8.8      2025 06:56    TPro  7.4  /  Alb  3.6  /  TBili  0.2  /  DBili  x   /  AST  12  /  ALT  10  /  AlkPhos  144[H]  -    RADIOLOGY & ADDITIONAL TESTS:  Imaging or report Personally Reviewed:  [x] YES  [ ] NO  EKG reviewed: [x] YES  [ ] NO    Medications:  Standing  aspirin  chewable 81 milliGRAM(s) Oral daily  atorvastatin 40 milliGRAM(s) Oral at bedtime  bacitracin   Ointment 1 Application(s) Topical daily  chlorhexidine 2% Cloths 1 Application(s) Topical daily  clopidogrel Tablet 75 milliGRAM(s) Oral daily  cyanocobalamin 1000 MICROGram(s) Oral daily  dextrose 5%. 1000 milliLiter(s) IV Continuous <Continuous>  dextrose 5%. 1000 milliLiter(s) IV Continuous <Continuous>  dextrose 50% Injectable 25 Gram(s) IV Push once  dextrose 50% Injectable 12.5 Gram(s) IV Push once  dextrose 50% Injectable 25 Gram(s) IV Push once  fluticasone propionate/ salmeterol 250-50 MICROgram(s) Diskus 1 Dose(s) Inhalation two times a day  folic acid 1 milliGRAM(s) Oral daily  glucagon  Injectable 1 milliGRAM(s) IntraMuscular once  heparin   Injectable 5000 Unit(s) SubCutaneous every 12 hours  insulin glargine Injectable (LANTUS) 20 Unit(s) SubCutaneous at bedtime  insulin lispro (ADMELOG) corrective regimen sliding scale   SubCutaneous three times a day before meals  metoclopramide 5 milliGRAM(s) Oral two times a day  metoprolol tartrate 50 milliGRAM(s) Oral two times a day  mupirocin 2% Nasal 1 Application(s) Both Nostrils two times a day  NIFEdipine XL 30 milliGRAM(s) Oral daily  pantoprazole    Tablet 40 milliGRAM(s) Oral before breakfast  senna 2 Tablet(s) Oral at bedtime  sevelamer carbonate 1600 milliGRAM(s) Oral three times a day with meals    PRN Meds  acetaminophen     Tablet .. 650 milliGRAM(s) Oral every 6 hours PRN  aluminum hydroxide/magnesium hydroxide/simethicone Suspension 30 milliLiter(s) Oral every 4 hours PRN  dextrose Oral Gel 15 Gram(s) Oral once PRN  melatonin 3 milliGRAM(s) Oral at bedtime PRN  ondansetron Injectable 4 milliGRAM(s) IV Push every 8 hours PRN             SCHWABACHER, LAWRENCE  65y Male    CHIEF COMPLAINT:    Patient is a 65y old  Male who presents with a chief complaint of Volume Overload (2025 12:47)    INTERVAL HPI/OVERNIGHT EVENTS:    Patient seen and examined. No acute events overnight. No active complaints, comfortable on room air, hypoglycemic in AM     ROS: All other systems are negative.    Vital Signs:    T(F): 98 (25 @ 11:45), Max: 98.1 (25 @ 04:23)  HR: 64 (25 @ 12:10) (62 - 80)  BP: 147/76 (25 @ 12:10) (120/69 - 154/75)  RR: 18 (25 @ 12:10) (16 - 18)  SpO2: 93% (25 @ 08:20) (93% - 99%)    2025 07:01  -  2025 07:00  --------------------------------------------------------  IN: 480 mL / OUT: 2000 mL / NET: -1520 mL    2025 07:01  -  2025 14:40  --------------------------------------------------------  IN: 360 mL / OUT: 0 mL / NET: 360 mL      Daily Weight in k (2025 19:41)    POCT Blood Glucose.: 132 mg/dL (2025 11:45)  POCT Blood Glucose.: 62 mg/dL (2025 07:42)  POCT Blood Glucose.: 129 mg/dL (2025 20:50)    PHYSICAL EXAM:    GENERAL:  NAD pale   SKIN: No rashes or lesions  HEENT: Atraumatic. Normocephalic.    NECK: Supple, No JVD.    PULMONARY: CTA B/L. No wheezing.    CVS: Normal S1, S2. Rate and Rhythm are regular.    ABDOMEN/GI: Soft, Nontender, Nondistended   MSK:  No clubbing or cyanosis   NEUROLOGIC: moves all extremities   PSYCH: Awake and alert     Consultant(s) Notes Reviewed:  [x ] YES  [ ] NO  Care Discussed with Consultants/Other Providers [ x] YES  [ ] NO    LABS:                        9.8    6.41  )-----------( 202      ( 2025 06:56 )             29.5     139  |  98  |  38[H]  ----------------------------<  52[LL]  4.1   |  30  |  4.1[HH]    Ca    8.8      2025 06:56    TPro  7.4  /  Alb  3.6  /  TBili  0.2  /  DBili  x   /  AST  12  /  ALT  10  /  AlkPhos  144[H]  -    RADIOLOGY & ADDITIONAL TESTS:  Imaging or report Personally Reviewed:  [x] YES  [ ] NO  EKG reviewed: [x] YES  [ ] NO    Medications:  Standing  aspirin  chewable 81 milliGRAM(s) Oral daily  atorvastatin 40 milliGRAM(s) Oral at bedtime  bacitracin   Ointment 1 Application(s) Topical daily  chlorhexidine 2% Cloths 1 Application(s) Topical daily  clopidogrel Tablet 75 milliGRAM(s) Oral daily  cyanocobalamin 1000 MICROGram(s) Oral daily  dextrose 5%. 1000 milliLiter(s) IV Continuous <Continuous>  dextrose 5%. 1000 milliLiter(s) IV Continuous <Continuous>  dextrose 50% Injectable 25 Gram(s) IV Push once  dextrose 50% Injectable 12.5 Gram(s) IV Push once  dextrose 50% Injectable 25 Gram(s) IV Push once  fluticasone propionate/ salmeterol 250-50 MICROgram(s) Diskus 1 Dose(s) Inhalation two times a day  folic acid 1 milliGRAM(s) Oral daily  glucagon  Injectable 1 milliGRAM(s) IntraMuscular once  heparin   Injectable 5000 Unit(s) SubCutaneous every 12 hours  insulin glargine Injectable (LANTUS) 20 Unit(s) SubCutaneous at bedtime  insulin lispro (ADMELOG) corrective regimen sliding scale   SubCutaneous three times a day before meals  metoclopramide 5 milliGRAM(s) Oral two times a day  metoprolol tartrate 50 milliGRAM(s) Oral two times a day  mupirocin 2% Nasal 1 Application(s) Both Nostrils two times a day  NIFEdipine XL 30 milliGRAM(s) Oral daily  pantoprazole    Tablet 40 milliGRAM(s) Oral before breakfast  senna 2 Tablet(s) Oral at bedtime  sevelamer carbonate 1600 milliGRAM(s) Oral three times a day with meals    PRN Meds  acetaminophen     Tablet .. 650 milliGRAM(s) Oral every 6 hours PRN  aluminum hydroxide/magnesium hydroxide/simethicone Suspension 30 milliLiter(s) Oral every 4 hours PRN  dextrose Oral Gel 15 Gram(s) Oral once PRN  melatonin 3 milliGRAM(s) Oral at bedtime PRN  ondansetron Injectable 4 milliGRAM(s) IV Push every 8 hours PRN

## 2025-06-06 NOTE — PROGRESS NOTE ADULT - SUBJECTIVE AND OBJECTIVE BOX
SUBJECTIVE/OVERNIGHT EVENTS  Today is hospital day 6d. This morning patient was seen and examined at bedside, resting comfortably in bed. No acute or major events overnight. Patient reported doing well and has no complaints today. Tolerated HD well yesterday         MEDICATIONS  STANDING MEDICATIONS  aspirin  chewable 81 milliGRAM(s) Oral daily  atorvastatin 40 milliGRAM(s) Oral at bedtime  bacitracin   Ointment 1 Application(s) Topical daily  chlorhexidine 2% Cloths 1 Application(s) Topical daily  clopidogrel Tablet 75 milliGRAM(s) Oral daily  cyanocobalamin 1000 MICROGram(s) Oral daily  dextrose 5%. 1000 milliLiter(s) IV Continuous <Continuous>  dextrose 5%. 1000 milliLiter(s) IV Continuous <Continuous>  dextrose 50% Injectable 25 Gram(s) IV Push once  dextrose 50% Injectable 12.5 Gram(s) IV Push once  dextrose 50% Injectable 25 Gram(s) IV Push once  fluticasone propionate/ salmeterol 250-50 MICROgram(s) Diskus 1 Dose(s) Inhalation two times a day  folic acid 1 milliGRAM(s) Oral daily  glucagon  Injectable 1 milliGRAM(s) IntraMuscular once  heparin   Injectable 5000 Unit(s) SubCutaneous every 12 hours  insulin glargine Injectable (LANTUS) 20 Unit(s) SubCutaneous at bedtime  insulin lispro (ADMELOG) corrective regimen sliding scale   SubCutaneous three times a day before meals  metoclopramide 5 milliGRAM(s) Oral two times a day  metoprolol tartrate 50 milliGRAM(s) Oral two times a day  mupirocin 2% Nasal 1 Application(s) Both Nostrils two times a day  NIFEdipine XL 30 milliGRAM(s) Oral daily  pantoprazole    Tablet 40 milliGRAM(s) Oral before breakfast  senna 2 Tablet(s) Oral at bedtime  sevelamer carbonate 1600 milliGRAM(s) Oral three times a day with meals    PRN MEDICATIONS  acetaminophen     Tablet .. 650 milliGRAM(s) Oral every 6 hours PRN  aluminum hydroxide/magnesium hydroxide/simethicone Suspension 30 milliLiter(s) Oral every 4 hours PRN  dextrose Oral Gel 15 Gram(s) Oral once PRN  melatonin 3 milliGRAM(s) Oral at bedtime PRN  ondansetron Injectable 4 milliGRAM(s) IV Push every 8 hours PRN    VITALS  T(F): 98.1 (06-06-25 @ 04:23), Max: 98.1 (06-06-25 @ 04:23)  HR: 64 (06-06-25 @ 12:10) (63 - 80)  BP: 147/76 (06-06-25 @ 12:10) (131/76 - 154/75)  RR: 18 (06-06-25 @ 12:10) (16 - 18)  SpO2: 93% (06-06-25 @ 08:20) (93% - 99%)  POCT Blood Glucose.: 132 mg/dL (06-06-25 @ 11:45)  POCT Blood Glucose.: 62 mg/dL (06-06-25 @ 07:42)  POCT Blood Glucose.: 129 mg/dL (06-05-25 @ 20:50)      PHYSICAL EXAM:  GENERAL: NAD, well-groomed, well-developed  HEAD:  Atraumatic, Normocephalic  EYES: EOMI, PERRLA, conjunctiva and sclera clear  ENMT:  Moist mucous membranes  NECK: Supple, No JVD,  CHEST/LUNG: Diminished breath sounds on left side  HEART: Regular rate and rhythm  ABDOMEN: Soft, Nontender, Nondistended; Bowel sounds present  NEURO:  MENTAL STATUS: AAOx3  LANG/SPEECH: Fluent, intact naming, repetition & comprehension  CRANIAL NERVES:  II: Pupils equal and reactive, no RAPD, normal visual field and fundus  III, IV, VI: EOM intact, no gaze preference or deviation  V: normal  VII: no facial asymmetry  VIII: normal hearing to speech  MOTOR:   REFLEXES:   SENSORY: Normal to touch  COORD:    Gait:   EXTREMITIES: LE edema  LYMPH: No lymphadenopathy noted  SKIN: No rashes or lesions         LABS             9.8    6.41  )-----------( 202      ( 06-06-25 @ 06:56 )             29.5     139  |  98  |  38  -------------------------<  52   06-06-25 @ 06:56  4.1  |  30  |  4.1    Ca      8.8     06-06-25 @ 06:56    TPro  7.4  /  Alb  3.6  /  TBili  0.2  /  DBili  x   /  AST  12  /  ALT  10  /  AlkPhos  144  /  GGT  x     06-06-25 @ 06:56        Urinalysis Basic - ( 06 Jun 2025 06:56 )    Color: x / Appearance: x / SG: x / pH: x  Gluc: 52 mg/dL / Ketone: x  / Bili: x / Urobili: x   Blood: x / Protein: x / Nitrite: x   Leuk Esterase: x / RBC: x / WBC x   Sq Epi: x / Non Sq Epi: x / Bacteria: x          IMAGING  CXR- Bibasilar opacities/effusions, left greater than right, unchanged from 06/03/2025 CXR.    VA Duplex Lower Extrem Arterial, Bilat (06.02.25 @ 16:34) >  The right posterior tibial artery is occluded. Moderate stenosis of the   right dorsalis pedis artery.    Left posterior tibial and peroneal arteries are occluded.          ASSESSMENT AND PLAN:    65-year-old male with a past medical history of end-stage renal disease on hemodialysis Monday Wednesday Friday via left arm AV fistula, diabetes, BPH, CAD status post CABG, hypertension, CHF, TIA, peripheral arterial disease, history of left toe ulcers status post amputations presents to the ED for evaluation abrasion to the left heel which she sustained about an hour ago which has now stopped bleeding.  Patient is on aspirin and Plavix.  Patient denies fever, chills, weakness, or recent trauma.    A/P:   Volume overload:   Patient with pleural effusion on left, leg edema, scrotal edema  CXR showed large left pleural effusion, pulmonary congestion.   Echo in Sept 2024 showed LVEF 58%  Patient with minimal urine output.   Fluid restriction <1L day, continue fluid removal during HD.     Immobility  -PT consulted for limited movement.    Left Superficial Heel Ulcer  Diabetic foot ulcer with Charcot Deformity  PAD:   Foot x ray showed Stable severe Charcot arthropathy with loss of normal plantar arch. Severe midfoot and forefoot degenerative changes  Arterial duplex showed The right posterior tibial artery is occluded. Moderate stenosis of the right dorsalis pedis artery. Left posterior tibial and peroneal arteries are occluded.  Seen by podiatry and Infectious disease, wound care, no need for antibiotics, WBAT.   Follow up with Vascular outpatient on 06/13/2025    ESRD  Renagel 1600 tid  HD today

## 2025-06-07 LAB
ALBUMIN SERPL ELPH-MCNC: 3.6 G/DL — SIGNIFICANT CHANGE UP (ref 3.5–5.2)
ALP SERPL-CCNC: 134 U/L — HIGH (ref 30–115)
ALT FLD-CCNC: 9 U/L — SIGNIFICANT CHANGE UP (ref 0–41)
ANION GAP SERPL CALC-SCNC: 14 MMOL/L — SIGNIFICANT CHANGE UP (ref 7–14)
AST SERPL-CCNC: 13 U/L — SIGNIFICANT CHANGE UP (ref 0–41)
BASOPHILS # BLD AUTO: 0.08 K/UL — SIGNIFICANT CHANGE UP (ref 0–0.2)
BASOPHILS NFR BLD AUTO: 1.2 % — HIGH (ref 0–1)
BILIRUB SERPL-MCNC: 0.3 MG/DL — SIGNIFICANT CHANGE UP (ref 0.2–1.2)
BUN SERPL-MCNC: 39 MG/DL — HIGH (ref 10–20)
CALCIUM SERPL-MCNC: 8.7 MG/DL — SIGNIFICANT CHANGE UP (ref 8.4–10.5)
CHLORIDE SERPL-SCNC: 98 MMOL/L — SIGNIFICANT CHANGE UP (ref 98–110)
CO2 SERPL-SCNC: 28 MMOL/L — SIGNIFICANT CHANGE UP (ref 17–32)
CREAT SERPL-MCNC: 3.8 MG/DL — HIGH (ref 0.7–1.5)
EGFR: 17 ML/MIN/1.73M2 — LOW
EGFR: 17 ML/MIN/1.73M2 — LOW
EOSINOPHIL # BLD AUTO: 0.29 K/UL — SIGNIFICANT CHANGE UP (ref 0–0.7)
EOSINOPHIL NFR BLD AUTO: 4.4 % — SIGNIFICANT CHANGE UP (ref 0–8)
GLUCOSE BLDC GLUCOMTR-MCNC: 74 MG/DL — SIGNIFICANT CHANGE UP (ref 70–99)
GLUCOSE SERPL-MCNC: 68 MG/DL — LOW (ref 70–99)
HCT VFR BLD CALC: 32.2 % — LOW (ref 42–52)
HGB BLD-MCNC: 10.5 G/DL — LOW (ref 14–18)
IMM GRANULOCYTES NFR BLD AUTO: 0.3 % — SIGNIFICANT CHANGE UP (ref 0.1–0.3)
LYMPHOCYTES # BLD AUTO: 0.86 K/UL — LOW (ref 1.2–3.4)
LYMPHOCYTES # BLD AUTO: 13.1 % — LOW (ref 20.5–51.1)
MCHC RBC-ENTMCNC: 32.6 G/DL — SIGNIFICANT CHANGE UP (ref 32–37)
MCHC RBC-ENTMCNC: 33.4 PG — HIGH (ref 27–31)
MCV RBC AUTO: 102.5 FL — HIGH (ref 80–94)
MONOCYTES # BLD AUTO: 0.73 K/UL — HIGH (ref 0.1–0.6)
MONOCYTES NFR BLD AUTO: 11.1 % — HIGH (ref 1.7–9.3)
NEUTROPHILS # BLD AUTO: 4.57 K/UL — SIGNIFICANT CHANGE UP (ref 1.4–6.5)
NEUTROPHILS NFR BLD AUTO: 69.9 % — SIGNIFICANT CHANGE UP (ref 42.2–75.2)
NRBC BLD AUTO-RTO: 0 /100 WBCS — SIGNIFICANT CHANGE UP (ref 0–0)
PLATELET # BLD AUTO: 208 K/UL — SIGNIFICANT CHANGE UP (ref 130–400)
PMV BLD: 11.1 FL — HIGH (ref 7.4–10.4)
POTASSIUM SERPL-MCNC: 4.1 MMOL/L — SIGNIFICANT CHANGE UP (ref 3.5–5)
POTASSIUM SERPL-SCNC: 4.1 MMOL/L — SIGNIFICANT CHANGE UP (ref 3.5–5)
PROT SERPL-MCNC: 6.8 G/DL — SIGNIFICANT CHANGE UP (ref 6–8)
RBC # BLD: 3.14 M/UL — LOW (ref 4.7–6.1)
RBC # FLD: 13.3 % — SIGNIFICANT CHANGE UP (ref 11.5–14.5)
SODIUM SERPL-SCNC: 140 MMOL/L — SIGNIFICANT CHANGE UP (ref 135–146)
WBC # BLD: 6.55 K/UL — SIGNIFICANT CHANGE UP (ref 4.8–10.8)
WBC # FLD AUTO: 6.55 K/UL — SIGNIFICANT CHANGE UP (ref 4.8–10.8)

## 2025-06-07 PROCEDURE — 99233 SBSQ HOSP IP/OBS HIGH 50: CPT

## 2025-06-07 RX ORDER — INSULIN GLARGINE-YFGN 100 [IU]/ML
12 INJECTION, SOLUTION SUBCUTANEOUS AT BEDTIME
Refills: 0 | Status: DISCONTINUED | OUTPATIENT
Start: 2025-06-07 | End: 2025-06-08

## 2025-06-07 RX ORDER — INSULIN GLARGINE-YFGN 100 [IU]/ML
15 INJECTION, SOLUTION SUBCUTANEOUS AT BEDTIME
Refills: 0 | Status: DISCONTINUED | OUTPATIENT
Start: 2025-06-07 | End: 2025-06-07

## 2025-06-07 RX ADMIN — SEVELAMER HYDROCHLORIDE 1600 MILLIGRAM(S): 800 TABLET ORAL at 12:57

## 2025-06-07 RX ADMIN — HEPARIN SODIUM 5000 UNIT(S): 1000 INJECTION INTRAVENOUS; SUBCUTANEOUS at 05:22

## 2025-06-07 RX ADMIN — Medication 5 MILLIGRAM(S): at 17:24

## 2025-06-07 RX ADMIN — CYANOCOBALAMIN 1000 MICROGRAM(S): 1000 INJECTION INTRAMUSCULAR; SUBCUTANEOUS at 11:25

## 2025-06-07 RX ADMIN — Medication 1 APPLICATION(S): at 11:27

## 2025-06-07 RX ADMIN — Medication 2 TABLET(S): at 21:56

## 2025-06-07 RX ADMIN — Medication 5 MILLIGRAM(S): at 05:20

## 2025-06-07 RX ADMIN — HEPARIN SODIUM 5000 UNIT(S): 1000 INJECTION INTRAVENOUS; SUBCUTANEOUS at 17:24

## 2025-06-07 RX ADMIN — ATORVASTATIN CALCIUM 40 MILLIGRAM(S): 80 TABLET, FILM COATED ORAL at 21:55

## 2025-06-07 RX ADMIN — METOPROLOL SUCCINATE 50 MILLIGRAM(S): 50 TABLET, EXTENDED RELEASE ORAL at 17:24

## 2025-06-07 RX ADMIN — SEVELAMER HYDROCHLORIDE 1600 MILLIGRAM(S): 800 TABLET ORAL at 08:09

## 2025-06-07 RX ADMIN — Medication 81 MILLIGRAM(S): at 11:25

## 2025-06-07 RX ADMIN — INSULIN GLARGINE-YFGN 12 UNIT(S): 100 INJECTION, SOLUTION SUBCUTANEOUS at 21:55

## 2025-06-07 RX ADMIN — METOPROLOL SUCCINATE 50 MILLIGRAM(S): 50 TABLET, EXTENDED RELEASE ORAL at 05:21

## 2025-06-07 RX ADMIN — Medication 30 MILLIGRAM(S): at 05:21

## 2025-06-07 RX ADMIN — MUPIROCIN CALCIUM 1 APPLICATION(S): 20 CREAM TOPICAL at 17:25

## 2025-06-07 RX ADMIN — CLOPIDOGREL BISULFATE 75 MILLIGRAM(S): 75 TABLET, FILM COATED ORAL at 11:25

## 2025-06-07 RX ADMIN — Medication 1 DOSE(S): at 08:30

## 2025-06-07 RX ADMIN — Medication 40 MILLIGRAM(S): at 05:21

## 2025-06-07 RX ADMIN — MUPIROCIN CALCIUM 1 APPLICATION(S): 20 CREAM TOPICAL at 05:19

## 2025-06-07 RX ADMIN — SEVELAMER HYDROCHLORIDE 1600 MILLIGRAM(S): 800 TABLET ORAL at 17:24

## 2025-06-07 RX ADMIN — FOLIC ACID 1 MILLIGRAM(S): 1 TABLET ORAL at 11:25

## 2025-06-07 RX ADMIN — Medication 1 APPLICATION(S): at 11:25

## 2025-06-07 NOTE — PROGRESS NOTE ADULT - ASSESSMENT
ESRD on HD MWF at [facility]  L heel ulcer  DM  BPH  CAD status post CABG  Hypertension  TIA  Peripheral arterial disease    Plan:    HD Monday: 3 hours, opti 160 dialyzer, 2K bath, 3.5L UF  Renally dose antibiotics  Sevelamer with meals  Renal diet  Fluid restriction

## 2025-06-07 NOTE — PROGRESS NOTE ADULT - SUBJECTIVE AND OBJECTIVE BOX
[facility] NEPHROLOGY FOLLOW UP NOTE  --------------------------------------------------------------------------------  24 hour events/subjective: Patient examined. Appears comfortable.    PAST HISTORY  --------------------------------------------------------------------------------  No significant changes to PMH, PSH, FHx, SHx, unless otherwise noted    ALLERGIES & MEDICATIONS  --------------------------------------------------------------------------------  Allergies    No Known Allergies    Standing Inpatient Medications  aspirin  chewable 81 milliGRAM(s) Oral daily  atorvastatin 40 milliGRAM(s) Oral at bedtime  bacitracin   Ointment 1 Application(s) Topical daily  chlorhexidine 2% Cloths 1 Application(s) Topical daily  clopidogrel Tablet 75 milliGRAM(s) Oral daily  cyanocobalamin 1000 MICROGram(s) Oral daily  dextrose 5%. 1000 milliLiter(s) IV Continuous <Continuous>  dextrose 5%. 1000 milliLiter(s) IV Continuous <Continuous>  dextrose 50% Injectable 25 Gram(s) IV Push once  dextrose 50% Injectable 12.5 Gram(s) IV Push once  dextrose 50% Injectable 25 Gram(s) IV Push once  fluticasone propionate/ salmeterol 250-50 MICROgram(s) Diskus 1 Dose(s) Inhalation two times a day  folic acid 1 milliGRAM(s) Oral daily  glucagon  Injectable 1 milliGRAM(s) IntraMuscular once  heparin   Injectable 5000 Unit(s) SubCutaneous every 12 hours  insulin glargine Injectable (LANTUS) 20 Unit(s) SubCutaneous at bedtime  insulin lispro (ADMELOG) corrective regimen sliding scale   SubCutaneous three times a day before meals  metoclopramide 5 milliGRAM(s) Oral two times a day  metoprolol tartrate 50 milliGRAM(s) Oral two times a day  mupirocin 2% Nasal 1 Application(s) Both Nostrils two times a day  NIFEdipine XL 30 milliGRAM(s) Oral daily  pantoprazole    Tablet 40 milliGRAM(s) Oral before breakfast  senna 2 Tablet(s) Oral at bedtime  sevelamer carbonate 1600 milliGRAM(s) Oral three times a day with meals    PRN Inpatient Medications  acetaminophen     Tablet .. 650 milliGRAM(s) Oral every 6 hours PRN  aluminum hydroxide/magnesium hydroxide/simethicone Suspension 30 milliLiter(s) Oral every 4 hours PRN  dextrose Oral Gel 15 Gram(s) Oral once PRN  melatonin 3 milliGRAM(s) Oral at bedtime PRN  ondansetron Injectable 4 milliGRAM(s) IV Push every 8 hours PRN    VITALS/PHYSICAL EXAM  --------------------------------------------------------------------------------  T(C): 36.7 (06-07-25 @ 04:46), Max: 37.1 (06-06-25 @ 21:39)  HR: 74 (06-07-25 @ 04:46) (62 - 74)  BP: 171/81 (06-07-25 @ 04:46) (120/69 - 171/81)  RR: 18 (06-07-25 @ 04:46) (18 - 18)  SpO2: 97% (06-07-25 @ 04:46) (92% - 97%)    06-06-25 @ 07:01  -  06-07-25 @ 07:00  --------------------------------------------------------  IN: 798 mL / OUT: 3500 mL / NET: -2702 mL    Physical Exam:  	Gen: NAD  	Pulm: CTA B/L  	CV: RRR, S1S2  	Abd: +BS, soft, nontender/nondistended  	: No suprapubic tenderness  	LE: Warm, no edema  	Vascular access: AVF    LABS/STUDIES  --------------------------------------------------------------------------------              10.5   6.55  >-----------<  208      [06-07-25 @ 07:32]              32.2     140  |  98  |  39  ----------------------------<  68      [06-07-25 @ 07:32]  4.1   |  28  |  3.8        Ca     8.7     [06-07-25 @ 07:32]    TPro  6.8  /  Alb  3.6  /  TBili  0.3  /  DBili  x   /  AST  13  /  ALT  9   /  AlkPhos  134  [06-07-25 @ 07:32]    Creatinine Trend:  SCr 3.8 [06-07 @ 07:32]  SCr 4.1 [06-06 @ 06:56]  SCr 4.4 [06-05 @ 06:04]  SCr 5.3 [06-04 @ 06:59]  SCr 4.4 [06-03 @ 06:01]    Urinalysis - [06-07-25 @ 07:32]      Color  / Appearance  / SG  / pH       Gluc 68 / Ketone   / Bili  / Urobili        Blood  / Protein  / Leuk Est  / Nitrite       RBC  / WBC  / Hyaline  / Gran  / Sq Epi  / Non Sq Epi  / Bacteria     Iron 93, TIBC --, %sat --      [10-01-24 @ 07:00]  Ferritin 1129      [10-01-24 @ 07:00]  PTH -- (Ca 7.8)      [09-24-24 @ 06:56]   135  Vitamin D (25OH) 15      [09-25-24 @ 06:49]  Lipid: chol 81, TG 52, HDL 43, LDL --      [06-02-25 @ 06:24]

## 2025-06-07 NOTE — PROGRESS NOTE ADULT - SUBJECTIVE AND OBJECTIVE BOX
SCHWABACHER, LAWRENCE  [facility] 012 B ([facility])            Patient was evaluated and examined  by bedside,                 REVIEW OF SYSTEMS:  please see pertinent positives mentioned above, all other 12 ROS negative      T(C): , Max: 37.1 (06-06-25 @ 21:39)  HR: 74 (06-07-25 @ 04:46)  BP: 171/81 (06-07-25 @ 04:46)  RR: 18 (06-07-25 @ 04:46)  SpO2: 97% (06-07-25 @ 04:46)  CAPILLARY BLOOD GLUCOSE      POCT Blood Glucose.: 74 mg/dL (07 Jun 2025 07:34)  POCT Blood Glucose.: 136 mg/dL (06 Jun 2025 21:58)  POCT Blood Glucose.: 99 mg/dL (06 Jun 2025 16:14)      PHYSICAL EXAM:  General: NAD, comfortable in bed  HEENT: NCAT  Lungs: No increased WOB  CVS: RRR  Abdomen: , non-distended  Extremities: no edema      LAB  CBC  Date: 06-07-25 @ 07:32  Mean cell Etfrxdelxc66.4  Mean cell Hemoglobin Conc32.6  Mean cell Volum 102.5  Platelet count-Automate 208  RBC Count 3.14  Red Cell Distrib Width13.3  WBC Count6.55  % Albumin, Urine--  Hematocrit 32.2  Hemoglobin 10.5  CBC  Date: 06-06-25 @ 06:56  Mean cell Nxqtpjktdb07.8  Mean cell Hemoglobin Conc33.2  Mean cell Volum 101.7  Platelet count-Automate 202  RBC Count 2.90  Red Cell Distrib Width13.4  WBC Count6.41  % Albumin, Urine--  Hematocrit 29.5  Hemoglobin 9.8  CBC  Date: 06-05-25 @ 06:04  Mean cell Blwizkzncm19.3  Mean cell Hemoglobin Conc32.3  Mean cell Volum 103.2  Platelet count-Automate 218  RBC Count 2.85  Red Cell Distrib Width13.4  WBC Count6.53  % Albumin, Urine--  Hematocrit 29.4  Hemoglobin 9.5  CBC  Date: 06-04-25 @ 06:59  Mean cell Yyrmtrejia24.4  Mean cell Hemoglobin Conc31.7  Mean cell Volum 105.4  Platelet count-Automate 217  RBC Count 2.96  Red Cell Distrib Width13.7  WBC Count7.96  % Albumin, Urine--  Hematocrit 31.2  Hemoglobin 9.9  CBC  Date: 06-03-25 @ 06:01  Mean cell Tbpwiqtmxm96.2  Mean cell Hemoglobin Conc32.5  Mean cell Volum 105.0  Platelet count-Automate 198  RBC Count 2.81  Red Cell Distrib Width13.8  WBC Count7.46  % Albumin, Urine--  Hematocrit 29.5  Hemoglobin 9.6  CBC  Date: 06-02-25 @ 06:24  Mean cell Tlugzjdbxb67.5  Mean cell Hemoglobin Conc32.2  Mean cell Volum 104.0  Platelet count-Automate 204  RBC Count 2.72  Red Cell Distrib Width13.9  WBC Count7.62  % Albumin, Urine--  Hematocrit 28.3  Hemoglobin 9.1  CBC  Date: 06-01-25 @ 21:46  Mean cell Cwfithfqvx70.8  Mean cell Hemoglobin Conc32.5  Mean cell Volum 104.0  Platelet count-Automate 207  RBC Count 2.78  Red Cell Distrib Width13.9  WBC Count7.88  % Albumin, Urine--  Hematocrit 28.9  Hemoglobin 9.4    BMP  06-07-25 @ 07:32  Blood Gas Arterial-Calcium,Ionized--  Blood Urea Nitrogen, Serum 39 mg/dL[H] [10 - 20]  Carbon Dioxide, Serum28 mmol/L [17 - 32]  Chloride, Serum98 mmol/L [98 - 110]  Creatinie, Serum3.8 mg/dL[H] [0.7 - 1.5]  Glucose, Serum68 mg/dL[L] [70 - 99]  Potassium, Serum4.1 mmol/L [3.5 - 5.0]  Sodium, Serum 140 mmol/L [135 - 146]  [facility]  06-06-25 @ 06:56  Blood Gas Arterial-Calcium,Ionized--  Blood Urea Nitrogen, Serum 38 mg/dL[H] [10 - 20]  Carbon Dioxide, Serum30 mmol/L [17 - 32]  Chloride, Serum98 mmol/L [98 - 110]  Creatinie, Serum4.1 mg/dL[HH] [0.7 - 1.5] [Critical value:]  Glucose, Serum52 mg/dL[LL] [70 - 99] [Critical value:  TYPE:(C=Critical, N=Notification, A=Abnormal) C  TESTS: GLU  DATE/TIME CALLED: _06/06/2025 09:13:52 EDT  CALLED TO: MAR PHILLIP (3C)  READ BACK (2 Patient Identifiers)(Y/N): Y  READ BACK VALUES (Y/N): Y  CALLED BY: MJ]  Potassium, Serum4.1 mmol/L [3.5 - 5.0]  Sodium, Serum 139 mmol/L [135 - 146]  [facility]  06-05-25 @ 06:04  Blood Gas Arterial-Calcium,Ionized--  Blood Urea Nitrogen, Serum 43 mg/dL[H] [10 - 20]  Carbon Dioxide, Serum31 mmol/L [17 - 32]  Chloride, Serum94 mmol/L[L] [98 - 110]  Creatinie, Serum4.4 mg/dL[HH] [0.7 - 1.5] [Critical value:]  Glucose, Serum62 mg/dL[L] [70 - 99]  Potassium, Serum4.3 mmol/L [3.5 - 5.0]  Sodium, Serum 136 mmol/L [135 - 146]  [facility]  06-04-25 @ 06:59  Blood Gas Arterial-Calcium,Ionized--  Blood Urea Nitrogen, Serum 62 mg/dL[HH] [10 - 20] [Critical value:]  Carbon Dioxide, Serum29 mmol/L [17 - 32]  Chloride, Serum95 mmol/L[L] [98 - 110]  Creatinie, Serum5.3 mg/dL[HH] [0.7 - 1.5] [Critical value:]  Glucose, Serum55 mg/dL[L] [70 - 99]  Potassium, Serum4.6 mmol/L [3.5 - 5.0]  Sodium, Serum 137 mmol/L [135 - 146]  [facility]  06-03-25 @ 06:01  Blood Gas Arterial-Calcium,Ionized--  Blood Urea Nitrogen, Serum 46 mg/dL[H] [10 - 20]  Carbon Dioxide, Serum28 mmol/L [17 - 32]  Chloride, Serum96 mmol/L[L] [98 - 110]  Creatinie, Serum4.4 mg/dL[HH] [0.7 - 1.5] [Critical value:]  Glucose, Serum89 mg/dL [70 - 99]  Potassium, Serum4.5 mmol/L [3.5 - 5.0]  Sodium, Serum 139 mmol/L [135 - 146]              Microbiology:    Culture - Blood (collected 05-27-25 @ 16:02)  Source: Blood Blood-Peripheral  Final Report (06-01-25 @ 23:00):    No growth at 5 days    Culture - Blood (collected 05-27-25 @ 16:02)  Source: Blood Blood-Peripheral  Final Report (06-01-25 @ 23:00):    No growth at 5 days    Culture - Blood (collected 05-14-25 @ 16:32)  Source: Blood Blood  Final Report (05-19-25 @ 23:00):    No growth at 5 days    Culture - Blood (collected 05-14-25 @ 16:32)  Source: Blood Blood  Final Report (05-19-25 @ 23:00):    No growth at 5 days        RADIOLOGY & ADDITIONAL TESTS:        Medications:  acetaminophen     Tablet .. 650 milliGRAM(s) Oral every 6 hours PRN  aluminum hydroxide/magnesium hydroxide/simethicone Suspension 30 milliLiter(s) Oral every 4 hours PRN  aspirin  chewable 81 milliGRAM(s) Oral daily  atorvastatin 40 milliGRAM(s) Oral at bedtime  bacitracin   Ointment 1 Application(s) Topical daily  chlorhexidine 2% Cloths 1 Application(s) Topical daily  clopidogrel Tablet 75 milliGRAM(s) Oral daily  cyanocobalamin 1000 MICROGram(s) Oral daily  dextrose 5%. 1000 milliLiter(s) IV Continuous <Continuous>  dextrose 5%. 1000 milliLiter(s) IV Continuous <Continuous>  dextrose 50% Injectable 25 Gram(s) IV Push once  dextrose 50% Injectable 12.5 Gram(s) IV Push once  dextrose 50% Injectable 25 Gram(s) IV Push once  dextrose Oral Gel 15 Gram(s) Oral once PRN  fluticasone propionate/ salmeterol 250-50 MICROgram(s) Diskus 1 Dose(s) Inhalation two times a day  folic acid 1 milliGRAM(s) Oral daily  glucagon  Injectable 1 milliGRAM(s) IntraMuscular once  heparin   Injectable 5000 Unit(s) SubCutaneous every 12 hours  insulin glargine Injectable (LANTUS) 20 Unit(s) SubCutaneous at bedtime  insulin lispro (ADMELOG) corrective regimen sliding scale   SubCutaneous three times a day before meals  melatonin 3 milliGRAM(s) Oral at bedtime PRN  metoclopramide 5 milliGRAM(s) Oral two times a day  metoprolol tartrate 50 milliGRAM(s) Oral two times a day  mupirocin 2% Nasal 1 Application(s) Both Nostrils two times a day  NIFEdipine XL 30 milliGRAM(s) Oral daily  ondansetron Injectable 4 milliGRAM(s) IV Push every 8 hours PRN  pantoprazole    Tablet 40 milliGRAM(s) Oral before breakfast  senna 2 Tablet(s) Oral at bedtime  sevelamer carbonate 1600 milliGRAM(s) Oral three times a day with meals        Assessment and Plan:      65-year-old male with a past medical history of end-stage renal disease on hemodialysis Monday Wednesday Friday via left arm AV fistula, diabetes, BPH, CAD status post CABG, hypertension, CHF, TIA, peripheral arterial disease, history of left toe ulcers status post amputations presents to the ED for evaluation abrasion to the left heel which she sustained about an hour ago which has now stopped bleeding.  Patient is on aspirin and Plavix.  Patient denies fever, chills, weakness, or recent trauma.    #Uncontrolled T2DM with hypoglycemia  -FS was low on Lantus 20U, will reduce to 12U (6/7)    #ESRD on HD  #Volume overload: improving, likely due to above  -Improved with HD here, plan for d/c on Monday  -Minimal urine output    #Left Superficial Heel Ulcer  #Diabetic foot ulcer with Charcot Deformity  #PAD   -Foot x ray showed Stable severe Charcot arthropathy with loss of normal plantar arch. Severe midfoot and forefoot degenerative changes  -Arterial duplex showed The right posterior tibial artery is occluded. Moderate stenosis of the right dorsalis pedis artery. Left posterior tibial and peroneal arteries are occluded.  -Seen by podiatry and Infectious disease, wound care, no need for antibiotics, WBAT.   -Follow up with Vascular outpatient.     #CAD sp CABG  -Continue ASA, Plavix Lipitor.     #DVT: Heparin  #GI: pantoprazole     #Progress Note Handoff:  Pending (specify): improving volume overload and improvement in hypoglycemia    Disposition:From [facility], per CM, will not accept pt on weekend, anticipate for monday if BS is better controlled    Time-based billing (NON-critical care).     51 minutes spent on total encounter. The necessity of the time spent during the encounter on this date of service was due to:     time spent on review of labs, imaging studies, old records, obtaining history, personally examining patient, counselling and communicating with patient/ family, entering orders for medications/tests/etc, discussions with other health care providers, documentation in electronic health records, independent interpretation of labs, imaging/procedure results and care coordination

## 2025-06-08 LAB
ALBUMIN SERPL ELPH-MCNC: 3.5 G/DL — SIGNIFICANT CHANGE UP (ref 3.5–5.2)
ALP SERPL-CCNC: 124 U/L — HIGH (ref 30–115)
ALT FLD-CCNC: 11 U/L — SIGNIFICANT CHANGE UP (ref 0–41)
ANION GAP SERPL CALC-SCNC: 16 MMOL/L — HIGH (ref 7–14)
AST SERPL-CCNC: 14 U/L — SIGNIFICANT CHANGE UP (ref 0–41)
BASOPHILS # BLD AUTO: 0.08 K/UL — SIGNIFICANT CHANGE UP (ref 0–0.2)
BASOPHILS NFR BLD AUTO: 0.9 % — SIGNIFICANT CHANGE UP (ref 0–1)
BILIRUB SERPL-MCNC: 0.3 MG/DL — SIGNIFICANT CHANGE UP (ref 0.2–1.2)
BUN SERPL-MCNC: 63 MG/DL — CRITICAL HIGH (ref 10–20)
CALCIUM SERPL-MCNC: 8.7 MG/DL — SIGNIFICANT CHANGE UP (ref 8.4–10.5)
CHLORIDE SERPL-SCNC: 98 MMOL/L — SIGNIFICANT CHANGE UP (ref 98–110)
CO2 SERPL-SCNC: 24 MMOL/L — SIGNIFICANT CHANGE UP (ref 17–32)
CREAT SERPL-MCNC: 5.1 MG/DL — CRITICAL HIGH (ref 0.7–1.5)
EGFR: 12 ML/MIN/1.73M2 — LOW
EGFR: 12 ML/MIN/1.73M2 — LOW
EOSINOPHIL # BLD AUTO: 0.25 K/UL — SIGNIFICANT CHANGE UP (ref 0–0.7)
EOSINOPHIL NFR BLD AUTO: 2.9 % — SIGNIFICANT CHANGE UP (ref 0–8)
GLUCOSE SERPL-MCNC: 58 MG/DL — LOW (ref 70–99)
HCT VFR BLD CALC: 30.2 % — LOW (ref 42–52)
HGB BLD-MCNC: 10 G/DL — LOW (ref 14–18)
IMM GRANULOCYTES NFR BLD AUTO: 0.3 % — SIGNIFICANT CHANGE UP (ref 0.1–0.3)
LYMPHOCYTES # BLD AUTO: 0.83 K/UL — LOW (ref 1.2–3.4)
LYMPHOCYTES # BLD AUTO: 9.6 % — LOW (ref 20.5–51.1)
MCHC RBC-ENTMCNC: 33.1 G/DL — SIGNIFICANT CHANGE UP (ref 32–37)
MCHC RBC-ENTMCNC: 33.3 PG — HIGH (ref 27–31)
MCV RBC AUTO: 100.7 FL — HIGH (ref 80–94)
MONOCYTES # BLD AUTO: 0.72 K/UL — HIGH (ref 0.1–0.6)
MONOCYTES NFR BLD AUTO: 8.3 % — SIGNIFICANT CHANGE UP (ref 1.7–9.3)
NEUTROPHILS # BLD AUTO: 6.74 K/UL — HIGH (ref 1.4–6.5)
NEUTROPHILS NFR BLD AUTO: 78 % — HIGH (ref 42.2–75.2)
NRBC BLD AUTO-RTO: 0 /100 WBCS — SIGNIFICANT CHANGE UP (ref 0–0)
PLATELET # BLD AUTO: 207 K/UL — SIGNIFICANT CHANGE UP (ref 130–400)
PMV BLD: 11.5 FL — HIGH (ref 7.4–10.4)
POTASSIUM SERPL-MCNC: 4.6 MMOL/L — SIGNIFICANT CHANGE UP (ref 3.5–5)
POTASSIUM SERPL-SCNC: 4.6 MMOL/L — SIGNIFICANT CHANGE UP (ref 3.5–5)
PROT SERPL-MCNC: 6.8 G/DL — SIGNIFICANT CHANGE UP (ref 6–8)
RBC # BLD: 3 M/UL — LOW (ref 4.7–6.1)
RBC # FLD: 13.3 % — SIGNIFICANT CHANGE UP (ref 11.5–14.5)
SODIUM SERPL-SCNC: 138 MMOL/L — SIGNIFICANT CHANGE UP (ref 135–146)
WBC # BLD: 8.65 K/UL — SIGNIFICANT CHANGE UP (ref 4.8–10.8)
WBC # FLD AUTO: 8.65 K/UL — SIGNIFICANT CHANGE UP (ref 4.8–10.8)

## 2025-06-08 PROCEDURE — 99233 SBSQ HOSP IP/OBS HIGH 50: CPT

## 2025-06-08 RX ORDER — INSULIN GLARGINE-YFGN 100 [IU]/ML
6 INJECTION, SOLUTION SUBCUTANEOUS AT BEDTIME
Refills: 0 | Status: DISCONTINUED | OUTPATIENT
Start: 2025-06-08 | End: 2025-06-09

## 2025-06-08 RX ADMIN — Medication 1 DOSE(S): at 08:16

## 2025-06-08 RX ADMIN — MUPIROCIN CALCIUM 1 APPLICATION(S): 20 CREAM TOPICAL at 05:21

## 2025-06-08 RX ADMIN — Medication 2 TABLET(S): at 21:43

## 2025-06-08 RX ADMIN — Medication 5 MILLIGRAM(S): at 17:23

## 2025-06-08 RX ADMIN — METOPROLOL SUCCINATE 50 MILLIGRAM(S): 50 TABLET, EXTENDED RELEASE ORAL at 05:20

## 2025-06-08 RX ADMIN — Medication 5 MILLIGRAM(S): at 05:20

## 2025-06-08 RX ADMIN — SEVELAMER HYDROCHLORIDE 1600 MILLIGRAM(S): 800 TABLET ORAL at 08:16

## 2025-06-08 RX ADMIN — INSULIN GLARGINE-YFGN 6 UNIT(S): 100 INJECTION, SOLUTION SUBCUTANEOUS at 21:43

## 2025-06-08 RX ADMIN — SEVELAMER HYDROCHLORIDE 1600 MILLIGRAM(S): 800 TABLET ORAL at 11:35

## 2025-06-08 RX ADMIN — Medication 1 DOSE(S): at 20:49

## 2025-06-08 RX ADMIN — Medication 40 MILLIGRAM(S): at 05:20

## 2025-06-08 RX ADMIN — Medication 1 APPLICATION(S): at 11:37

## 2025-06-08 RX ADMIN — SEVELAMER HYDROCHLORIDE 1600 MILLIGRAM(S): 800 TABLET ORAL at 17:23

## 2025-06-08 RX ADMIN — Medication 81 MILLIGRAM(S): at 11:35

## 2025-06-08 RX ADMIN — CLOPIDOGREL BISULFATE 75 MILLIGRAM(S): 75 TABLET, FILM COATED ORAL at 11:34

## 2025-06-08 RX ADMIN — METOPROLOL SUCCINATE 50 MILLIGRAM(S): 50 TABLET, EXTENDED RELEASE ORAL at 17:23

## 2025-06-08 RX ADMIN — MUPIROCIN CALCIUM 1 APPLICATION(S): 20 CREAM TOPICAL at 17:24

## 2025-06-08 RX ADMIN — ATORVASTATIN CALCIUM 40 MILLIGRAM(S): 80 TABLET, FILM COATED ORAL at 21:46

## 2025-06-08 RX ADMIN — CYANOCOBALAMIN 1000 MICROGRAM(S): 1000 INJECTION INTRAMUSCULAR; SUBCUTANEOUS at 11:35

## 2025-06-08 RX ADMIN — Medication 30 MILLIGRAM(S): at 05:20

## 2025-06-08 RX ADMIN — Medication 1 APPLICATION(S): at 11:34

## 2025-06-08 RX ADMIN — HEPARIN SODIUM 5000 UNIT(S): 1000 INJECTION INTRAVENOUS; SUBCUTANEOUS at 17:24

## 2025-06-08 RX ADMIN — FOLIC ACID 1 MILLIGRAM(S): 1 TABLET ORAL at 11:34

## 2025-06-08 RX ADMIN — HEPARIN SODIUM 5000 UNIT(S): 1000 INJECTION INTRAVENOUS; SUBCUTANEOUS at 05:20

## 2025-06-08 NOTE — PROGRESS NOTE ADULT - SUBJECTIVE AND OBJECTIVE BOX
SCHWABACHER, LAWRENCE  [facility] 3[facility] 012 B ([facility])            Patient was evaluated and examined  by bedside,                 REVIEW OF SYSTEMS:  please see pertinent positives mentioned above, all other 12 ROS negative      T(C): , Max: 36.6 (06-07-25 @ 13:04)  HR: 64 (06-08-25 @ 04:26)  BP: 152/68 (06-08-25 @ 04:26)  RR: 18 (06-08-25 @ 04:26)  SpO2: 94% (06-08-25 @ 04:26)  CAPILLARY BLOOD GLUCOSE      POCT Blood Glucose.: 147 mg/dL (08 Jun 2025 11:23)  POCT Blood Glucose.: 71 mg/dL (08 Jun 2025 07:44)  POCT Blood Glucose.: 291 mg/dL (07 Jun 2025 21:38)  POCT Blood Glucose.: 117 mg/dL (07 Jun 2025 16:36)      PHYSICAL EXAM:  General: NAD, AAOX3, patient is laying comfortably in bed  HEENT: AT, NC, Supple, NO JVD, NO CB  Lungs: CTA B/L, no wheezing, no rhonchi  CVS: normal S1, S2, RRR, NO M/G/R  Abdomen: soft, bowel sounds present, non-tender, non-distended  Extremities: no edema, no clubbing, no cyanosis, positive peripheral pulses b/l  Neuro: no acute focal neurological deficits  Skin: no rush, no ecchymosis      LAB  CBC  Date: 06-08-25 @ 07:23  Mean cell Jlkydjlray67.3  Mean cell Hemoglobin Conc33.1  Mean cell Volum 100.7  Platelet count-Automate 207  RBC Count 3.00  Red Cell Distrib Width13.3  WBC Count8.65  % Albumin, Urine--  Hematocrit 30.2  Hemoglobin 10.0  CBC  Date: 06-07-25 @ 07:32  Mean cell Vmevabyuyc69.4  Mean cell Hemoglobin Conc32.6  Mean cell Volum 102.5  Platelet count-Automate 208  RBC Count 3.14  Red Cell Distrib Width13.3  WBC Count6.55  % Albumin, Urine--  Hematocrit 32.2  Hemoglobin 10.5  CBC  Date: 06-06-25 @ 06:56  Mean cell Pcqqsvmsnw88.8  Mean cell Hemoglobin Conc33.2  Mean cell Volum 101.7  Platelet count-Automate 202  RBC Count 2.90  Red Cell Distrib Width13.4  WBC Count6.41  % Albumin, Urine--  Hematocrit 29.5  Hemoglobin 9.8  CBC  Date: 06-05-25 @ 06:04  Mean cell Tgadpmmtfv98.3  Mean cell Hemoglobin Conc32.3  Mean cell Volum 103.2  Platelet count-Automate 218  RBC Count 2.85  Red Cell Distrib Width13.4  WBC Count6.53  % Albumin, Urine--  Hematocrit 29.4  Hemoglobin 9.5  CBC  Date: 06-04-25 @ 06:59  Mean cell Mihbfegwax23.4  Mean cell Hemoglobin Conc31.7  Mean cell Volum 105.4  Platelet count-Automate 217  RBC Count 2.96  Red Cell Distrib Width13.7  WBC Count7.96  % Albumin, Urine--  Hematocrit 31.2  Hemoglobin 9.9  CBC  Date: 06-03-25 @ 06:01  Mean cell Hcesxzomgz55.2  Mean cell Hemoglobin Conc32.5  Mean cell Volum 105.0  Platelet count-Automate 198  RBC Count 2.81  Red Cell Distrib Width13.8  WBC Count7.46  % Albumin, Urine--  Hematocrit 29.5  Hemoglobin 9.6  CBC  Date: 06-02-25 @ 06:24  Mean cell Vwvamijlej99.5  Mean cell Hemoglobin Conc32.2  Mean cell Volum 104.0  Platelet count-Automate 204  RBC Count 2.72  Red Cell Distrib Width13.9  WBC Count7.62  % Albumin, Urine--  Hematocrit 28.3  Hemoglobin 9.1  CBC  Date: 06-01-25 @ 21:46  Mean cell Pwxldfwhuk25.8  Mean cell Hemoglobin Conc32.5  Mean cell Volum 104.0  Platelet count-Automate 207  RBC Count 2.78  Red Cell Distrib Width13.9  WBC Count7.88  % Albumin, Urine--  Hematocrit 28.9  Hemoglobin 9.4    [facility]  06-08-25 @ 07:23  Blood Gas Arterial-Calcium,Ionized--  Blood Urea Nitrogen, Serum 63 mg/dL[HH] [10 - 20] [Critical value:]  Carbon Dioxide, Serum24 mmol/L [17 - 32]  Chloride, Serum98 mmol/L [98 - 110]  Creatinie, Serum5.1 mg/dL[HH] [0.7 - 1.5] [Critical value:]  Glucose, Serum58 mg/dL[L] [70 - 99]  Potassium, Serum4.6 mmol/L [3.5 - 5.0]  Sodium, Serum 138 mmol/L [135 - 146]  BMP  06-07-25 @ 07:32  Blood Gas Arterial-Calcium,Ionized--  Blood Urea Nitrogen, Serum 39 mg/dL[H] [10 - 20]  Carbon Dioxide, Serum28 mmol/L [17 - 32]  Chloride, Serum98 mmol/L [98 - 110]  Creatinie, Serum3.8 mg/dL[H] [0.7 - 1.5]  Glucose, Serum68 mg/dL[L] [70 - 99]  Potassium, Serum4.1 mmol/L [3.5 - 5.0]  Sodium, Serum 140 mmol/L [135 - 146]  [facility]  06-06-25 @ 06:56  Blood Gas Arterial-Calcium,Ionized--  Blood Urea Nitrogen, Serum 38 mg/dL[H] [10 - 20]  Carbon Dioxide, Serum30 mmol/L [17 - 32]  Chloride, Serum98 mmol/L [98 - 110]  Creatinie, Serum4.1 mg/dL[HH] [0.7 - 1.5] [Critical value:]  Glucose, Serum52 mg/dL[LL] [70 - 99] [Critical value:  TYPE:(C=Critical, N=Notification, A=Abnormal) C  TESTS: GLU  DATE/TIME CALLED: _06/06/2025 09:13:52 EDT  CALLED TO: MAR PHILLIP (3C)  READ BACK (2 Patient Identifiers)(Y/N): Y  READ BACK VALUES (Y/N): Y  CALLED BY: MJ]  Potassium, Serum4.1 mmol/L [3.5 - 5.0]  Sodium, Serum 139 mmol/L [135 - 146]  [facility]  06-05-25 @ 06:04  Blood Gas Arterial-Calcium,Ionized--  Blood Urea Nitrogen, Serum 43 mg/dL[H] [10 - 20]  Carbon Dioxide, Serum31 mmol/L [17 - 32]  Chloride, Serum94 mmol/L[L] [98 - 110]  Creatinie, Serum4.4 mg/dL[HH] [0.7 - 1.5] [Critical value:]  Glucose, Serum62 mg/dL[L] [70 - 99]  Potassium, Serum4.3 mmol/L [3.5 - 5.0]  Sodium, Serum 136 mmol/L [135 - 146]  [facility]  06-04-25 @ 06:59  Blood Gas Arterial-Calcium,Ionized--  Blood Urea Nitrogen, Serum 62 mg/dL[HH] [10 - 20] [Critical value:]  Carbon Dioxide, Serum29 mmol/L [17 - 32]  Chloride, Serum95 mmol/L[L] [98 - 110]  Creatinie, Serum5.3 mg/dL[HH] [0.7 - 1.5] [Critical value:]  Glucose, Serum55 mg/dL[L] [70 - 99]  Potassium, Serum4.6 mmol/L [3.5 - 5.0]  Sodium, Serum 137 mmol/L [135 - 146]              Microbiology:    Culture - Blood (collected 05-27-25 @ 16:02)  Source: Blood Blood-Peripheral  Final Report (06-01-25 @ 23:00):    No growth at 5 days    Culture - Blood (collected 05-27-25 @ 16:02)  Source: Blood Blood-Peripheral  Final Report (06-01-25 @ 23:00):    No growth at 5 days    Culture - Blood (collected 05-14-25 @ 16:32)  Source: Blood Blood  Final Report (05-19-25 @ 23:00):    No growth at 5 days    Culture - Blood (collected 05-14-25 @ 16:32)  Source: Blood Blood  Final Report (05-19-25 @ 23:00):    No growth at 5 days        RADIOLOGY & ADDITIONAL TESTS:        Medications:  acetaminophen     Tablet .. 650 milliGRAM(s) Oral every 6 hours PRN  aluminum hydroxide/magnesium hydroxide/simethicone Suspension 30 milliLiter(s) Oral every 4 hours PRN  aspirin  chewable 81 milliGRAM(s) Oral daily  atorvastatin 40 milliGRAM(s) Oral at bedtime  bacitracin   Ointment 1 Application(s) Topical daily  chlorhexidine 2% Cloths 1 Application(s) Topical daily  clopidogrel Tablet 75 milliGRAM(s) Oral daily  cyanocobalamin 1000 MICROGram(s) Oral daily  dextrose 5%. 1000 milliLiter(s) IV Continuous <Continuous>  dextrose 5%. 1000 milliLiter(s) IV Continuous <Continuous>  dextrose 50% Injectable 25 Gram(s) IV Push once  dextrose 50% Injectable 12.5 Gram(s) IV Push once  dextrose 50% Injectable 25 Gram(s) IV Push once  dextrose Oral Gel 15 Gram(s) Oral once PRN  fluticasone propionate/ salmeterol 250-50 MICROgram(s) Diskus 1 Dose(s) Inhalation two times a day  folic acid 1 milliGRAM(s) Oral daily  glucagon  Injectable 1 milliGRAM(s) IntraMuscular once  heparin   Injectable 5000 Unit(s) SubCutaneous every 12 hours  insulin glargine Injectable (LANTUS) 6 Unit(s) SubCutaneous at bedtime  insulin lispro (ADMELOG) corrective regimen sliding scale   SubCutaneous three times a day before meals  melatonin 3 milliGRAM(s) Oral at bedtime PRN  metoclopramide 5 milliGRAM(s) Oral two times a day  metoprolol tartrate 50 milliGRAM(s) Oral two times a day  mupirocin 2% Nasal 1 Application(s) Both Nostrils two times a day  NIFEdipine XL 30 milliGRAM(s) Oral daily  ondansetron Injectable 4 milliGRAM(s) IV Push every 8 hours PRN  pantoprazole    Tablet 40 milliGRAM(s) Oral before breakfast  senna 2 Tablet(s) Oral at bedtime  sevelamer carbonate 1600 milliGRAM(s) Oral three times a day with meals        Assessment and Plan:      65-year-old male with a past medical history of end-stage renal disease on hemodialysis Monday Wednesday Friday via left arm AV fistula, diabetes, BPH, CAD status post CABG, hypertension, CHF, TIA, peripheral arterial disease, history of left toe ulcers status post amputations presents to the ED for evaluation abrasion to the left heel which she sustained about an hour ago which has now stopped bleeding.  Patient is on aspirin and Plavix.  Patient denies fever, chills, weakness, or recent trauma.    #Uncontrolled T2DM with hypoglycemia  -Fasting glucose continues to be <70, reduce Lantus 12-->6U at bedtime  -A1c<6.0, if he continues to be hypoglycemic with even low dose Lantus may need to discontinue for now    #ESRD on HD  #Volume overload: improving, likely due to above  -Improved with HD here, plan for d/c on Monday  -Minimal urine output    #Left Superficial Heel Ulcer  #Diabetic foot ulcer with Charcot Deformity  #PAD   -Foot x ray showed Stable severe Charcot arthropathy with loss of normal plantar arch. Severe midfoot and forefoot degenerative changes  -Arterial duplex showed The right posterior tibial artery is occluded. Moderate stenosis of the right dorsalis pedis artery. Left posterior tibial and peroneal arteries are occluded.  -Seen by podiatry and Infectious disease, wound care, no need for antibiotics, WBAT.   -Follow up with Vascular outpatient.     #CAD sp CABG  -Continue ASA, Plavix Lipitor.     #DVT: Heparin  #GI: pantoprazole     #Progress Note Handoff:  Pending (specify): improving volume overload and improvement in hypoglycemia    Disposition:From [facility], per CM, will not accept pt on weekend, anticipate for monday if BS is better controlled    Time-based billing (NON-critical care).     51 minutes spent on total encounter. The necessity of the time spent during the encounter on this date of service was due to:     time spent on review of labs, imaging studies, old records, obtaining history, personally examining patient, counselling and communicating with patient/ family, entering orders for medications/tests/etc, discussions with other health care providers, documentation in electronic health records, independent interpretation of labs, imaging/procedure results and care coordination

## 2025-06-08 NOTE — PROGRESS NOTE ADULT - ASSESSMENT
ESRD on HD MWF at [facility] [place]  L heel ulcer  DM  BPH  CAD status post CABG  Hypertension  TIA  Peripheral arterial disease    Plan:    HD tomorrow: 3 hours, opti 160 dialyzer, 2K bath, 3.5L UF  Renally dose antibiotics  Sevelamer with meals  Renal diet  Fluid restriction

## 2025-06-08 NOTE — PROGRESS NOTE ADULT - SUBJECTIVE AND OBJECTIVE BOX
[facility] FOLLOW UP NOTE  --------------------------------------------------------------------------------  24 hour events/subjective: Patient examined. Appears comfortable.    PAST HISTORY  --------------------------------------------------------------------------------  No significant changes to PMH, PSH, FHx, SHx, unless otherwise noted    ALLERGIES & MEDICATIONS  --------------------------------------------------------------------------------  Allergies    No Known Allergies    Standing Inpatient Medications  aspirin  chewable 81 milliGRAM(s) Oral daily  atorvastatin 40 milliGRAM(s) Oral at bedtime  bacitracin   Ointment 1 Application(s) Topical daily  chlorhexidine 2% Cloths 1 Application(s) Topical daily  clopidogrel Tablet 75 milliGRAM(s) Oral daily  cyanocobalamin 1000 MICROGram(s) Oral daily  dextrose 5%. 1000 milliLiter(s) IV Continuous <Continuous>  dextrose 5%. 1000 milliLiter(s) IV Continuous <Continuous>  dextrose 50% Injectable 25 Gram(s) IV Push once  dextrose 50% Injectable 12.5 Gram(s) IV Push once  dextrose 50% Injectable 25 Gram(s) IV Push once  fluticasone propionate/ salmeterol 250-50 MICROgram(s) Diskus 1 Dose(s) Inhalation two times a day  folic acid 1 milliGRAM(s) Oral daily  glucagon  Injectable 1 milliGRAM(s) IntraMuscular once  heparin   Injectable 5000 Unit(s) SubCutaneous every 12 hours  insulin glargine Injectable (LANTUS) 6 Unit(s) SubCutaneous at bedtime  insulin lispro (ADMELOG) corrective regimen sliding scale   SubCutaneous three times a day before meals  metoclopramide 5 milliGRAM(s) Oral two times a day  metoprolol tartrate 50 milliGRAM(s) Oral two times a day  mupirocin 2% Nasal 1 Application(s) Both Nostrils two times a day  NIFEdipine XL 30 milliGRAM(s) Oral daily  pantoprazole    Tablet 40 milliGRAM(s) Oral before breakfast  senna 2 Tablet(s) Oral at bedtime  sevelamer carbonate 1600 milliGRAM(s) Oral three times a day with meals    PRN Inpatient Medications  acetaminophen     Tablet .. 650 milliGRAM(s) Oral every 6 hours PRN  aluminum hydroxide/magnesium hydroxide/simethicone Suspension 30 milliLiter(s) Oral every 4 hours PRN  dextrose Oral Gel 15 Gram(s) Oral once PRN  melatonin 3 milliGRAM(s) Oral at bedtime PRN  ondansetron Injectable 4 milliGRAM(s) IV Push every 8 hours PRN    VITALS/PHYSICAL EXAM  --------------------------------------------------------------------------------  T(C): 36.6 (06-08-25 @ 04:26), Max: 36.6 (06-08-25 @ 04:26)  HR: 64 (06-08-25 @ 04:26) (64 - 74)  BP: 152/68 (06-08-25 @ 04:26) (101/63 - 152/68)  RR: 18 (06-08-25 @ 04:26) (18 - 18)  SpO2: 94% (06-08-25 @ 04:26) (94% - 97%)    Physical Exam:  	Gen: NAD  	Pulm: CTA B/L  	CV: RRR, S1S2  	Abd: +BS, soft, nontender/nondistended  	: No suprapubic tenderness  	LE: Warm, no edema  	Vascular access: AVF    LABS/STUDIES  --------------------------------------------------------------------------------              10.0   8.65  >-----------<  207      [06-08-25 @ 07:23]              30.2     138  |  98  |  63  ----------------------------<  58      [06-08-25 @ 07:23]  4.6   |  24  |  5.1        Ca     8.7     [06-08-25 @ 07:23]    TPro  6.8  /  Alb  3.5  /  TBili  0.3  /  DBili  x   /  AST  14  /  ALT  11  /  AlkPhos  124  [06-08-25 @ 07:23]    Creatinine Trend:  SCr 5.1 [06-08 @ 07:23]  SCr 3.8 [06-07 @ 07:32]  SCr 4.1 [06-06 @ 06:56]  SCr 4.4 [06-05 @ 06:04]  SCr 5.3 [06-04 @ 06:59]    Urinalysis - [06-08-25 @ 07:23]      Color  / Appearance  / SG  / pH       Gluc 58 / Ketone   / Bili  / Urobili        Blood  / Protein  / Leuk Est  / Nitrite       RBC  / WBC  / Hyaline  / Gran  / Sq Epi  / Non Sq Epi  / Bacteria     Iron 93, TIBC --, %sat --      [10-01-24 @ 07:00]  Ferritin 1129      [10-01-24 @ 07:00]  PTH -- (Ca 7.8)      [09-24-24 @ 06:56]   135  Vitamin D (25OH) 15      [09-25-24 @ 06:49]  Lipid: chol 81, TG 52, HDL 43, LDL --      [06-02-25 @ 06:24]

## 2025-06-09 LAB
ALBUMIN SERPL ELPH-MCNC: 3.9 G/DL — SIGNIFICANT CHANGE UP (ref 3.5–5.2)
ALP SERPL-CCNC: 136 U/L — HIGH (ref 30–115)
ALT FLD-CCNC: 10 U/L — SIGNIFICANT CHANGE UP (ref 0–41)
ANION GAP SERPL CALC-SCNC: 14 MMOL/L — SIGNIFICANT CHANGE UP (ref 7–14)
AST SERPL-CCNC: 13 U/L — SIGNIFICANT CHANGE UP (ref 0–41)
BASOPHILS # BLD AUTO: 0.07 K/UL — SIGNIFICANT CHANGE UP (ref 0–0.2)
BASOPHILS NFR BLD AUTO: 0.8 % — SIGNIFICANT CHANGE UP (ref 0–1)
BILIRUB SERPL-MCNC: 0.3 MG/DL — SIGNIFICANT CHANGE UP (ref 0.2–1.2)
BUN SERPL-MCNC: 84 MG/DL — CRITICAL HIGH (ref 10–20)
CALCIUM SERPL-MCNC: 9.1 MG/DL — SIGNIFICANT CHANGE UP (ref 8.4–10.5)
CHLORIDE SERPL-SCNC: 97 MMOL/L — LOW (ref 98–110)
CO2 SERPL-SCNC: 26 MMOL/L — SIGNIFICANT CHANGE UP (ref 17–32)
CREAT SERPL-MCNC: 5.9 MG/DL — CRITICAL HIGH (ref 0.7–1.5)
EGFR: 10 ML/MIN/1.73M2 — LOW
EGFR: 10 ML/MIN/1.73M2 — LOW
EOSINOPHIL # BLD AUTO: 0.43 K/UL — SIGNIFICANT CHANGE UP (ref 0–0.7)
EOSINOPHIL NFR BLD AUTO: 4.7 % — SIGNIFICANT CHANGE UP (ref 0–8)
GLUCOSE SERPL-MCNC: 78 MG/DL — SIGNIFICANT CHANGE UP (ref 70–99)
HCT VFR BLD CALC: 30.7 % — LOW (ref 42–52)
HGB BLD-MCNC: 10.1 G/DL — LOW (ref 14–18)
IMM GRANULOCYTES NFR BLD AUTO: 0.4 % — HIGH (ref 0.1–0.3)
LYMPHOCYTES # BLD AUTO: 0.67 K/UL — LOW (ref 1.2–3.4)
LYMPHOCYTES # BLD AUTO: 7.3 % — LOW (ref 20.5–51.1)
MCHC RBC-ENTMCNC: 32.9 G/DL — SIGNIFICANT CHANGE UP (ref 32–37)
MCHC RBC-ENTMCNC: 33.3 PG — HIGH (ref 27–31)
MCV RBC AUTO: 101.3 FL — HIGH (ref 80–94)
MONOCYTES # BLD AUTO: 0.63 K/UL — HIGH (ref 0.1–0.6)
MONOCYTES NFR BLD AUTO: 6.9 % — SIGNIFICANT CHANGE UP (ref 1.7–9.3)
NEUTROPHILS # BLD AUTO: 7.29 K/UL — HIGH (ref 1.4–6.5)
NEUTROPHILS NFR BLD AUTO: 79.9 % — HIGH (ref 42.2–75.2)
NRBC BLD AUTO-RTO: 0 /100 WBCS — SIGNIFICANT CHANGE UP (ref 0–0)
PLATELET # BLD AUTO: 217 K/UL — SIGNIFICANT CHANGE UP (ref 130–400)
PMV BLD: 11.4 FL — HIGH (ref 7.4–10.4)
POTASSIUM SERPL-MCNC: 5.1 MMOL/L — HIGH (ref 3.5–5)
POTASSIUM SERPL-SCNC: 5.1 MMOL/L — HIGH (ref 3.5–5)
PROT SERPL-MCNC: 7.4 G/DL — SIGNIFICANT CHANGE UP (ref 6–8)
RBC # BLD: 3.03 M/UL — LOW (ref 4.7–6.1)
RBC # FLD: 13.2 % — SIGNIFICANT CHANGE UP (ref 11.5–14.5)
SODIUM SERPL-SCNC: 137 MMOL/L — SIGNIFICANT CHANGE UP (ref 135–146)
WBC # BLD: 9.13 K/UL — SIGNIFICANT CHANGE UP (ref 4.8–10.8)
WBC # FLD AUTO: 9.13 K/UL — SIGNIFICANT CHANGE UP (ref 4.8–10.8)

## 2025-06-09 PROCEDURE — 99233 SBSQ HOSP IP/OBS HIGH 50: CPT

## 2025-06-09 RX ORDER — SEVELAMER HYDROCHLORIDE 800 MG/1
2 TABLET ORAL
Qty: 180 | Refills: 0
Start: 2025-06-09 | End: 2025-07-08

## 2025-06-09 RX ORDER — ISOPROPYL ALCOHOL, BENZOCAINE .7; .06 ML/ML; ML/ML
0 SWAB TOPICAL
Qty: 100 | Refills: 1
Start: 2025-06-09

## 2025-06-09 RX ORDER — INSULIN GLARGINE-YFGN 100 [IU]/ML
4 INJECTION, SOLUTION SUBCUTANEOUS AT BEDTIME
Refills: 0 | Status: DISCONTINUED | OUTPATIENT
Start: 2025-06-09 | End: 2025-06-13

## 2025-06-09 RX ORDER — INSULIN GLARGINE-YFGN 100 [IU]/ML
4 INJECTION, SOLUTION SUBCUTANEOUS
Qty: 1 | Refills: 1
Start: 2025-06-09 | End: 2025-08-07

## 2025-06-09 RX ADMIN — Medication 5 MILLIGRAM(S): at 17:38

## 2025-06-09 RX ADMIN — FOLIC ACID 1 MILLIGRAM(S): 1 TABLET ORAL at 11:16

## 2025-06-09 RX ADMIN — MUPIROCIN CALCIUM 1 APPLICATION(S): 20 CREAM TOPICAL at 05:46

## 2025-06-09 RX ADMIN — Medication 1 DOSE(S): at 09:51

## 2025-06-09 RX ADMIN — SEVELAMER HYDROCHLORIDE 1600 MILLIGRAM(S): 800 TABLET ORAL at 08:47

## 2025-06-09 RX ADMIN — Medication 30 MILLIGRAM(S): at 05:45

## 2025-06-09 RX ADMIN — Medication 81 MILLIGRAM(S): at 11:16

## 2025-06-09 RX ADMIN — Medication 1 APPLICATION(S): at 11:18

## 2025-06-09 RX ADMIN — SEVELAMER HYDROCHLORIDE 1600 MILLIGRAM(S): 800 TABLET ORAL at 17:40

## 2025-06-09 RX ADMIN — ATORVASTATIN CALCIUM 40 MILLIGRAM(S): 80 TABLET, FILM COATED ORAL at 22:11

## 2025-06-09 RX ADMIN — CYANOCOBALAMIN 1000 MICROGRAM(S): 1000 INJECTION INTRAMUSCULAR; SUBCUTANEOUS at 11:16

## 2025-06-09 RX ADMIN — MUPIROCIN CALCIUM 1 APPLICATION(S): 20 CREAM TOPICAL at 17:41

## 2025-06-09 RX ADMIN — HEPARIN SODIUM 5000 UNIT(S): 1000 INJECTION INTRAVENOUS; SUBCUTANEOUS at 17:41

## 2025-06-09 RX ADMIN — METOPROLOL SUCCINATE 50 MILLIGRAM(S): 50 TABLET, EXTENDED RELEASE ORAL at 17:38

## 2025-06-09 RX ADMIN — CLOPIDOGREL BISULFATE 75 MILLIGRAM(S): 75 TABLET, FILM COATED ORAL at 11:16

## 2025-06-09 RX ADMIN — HEPARIN SODIUM 5000 UNIT(S): 1000 INJECTION INTRAVENOUS; SUBCUTANEOUS at 05:45

## 2025-06-09 RX ADMIN — INSULIN GLARGINE-YFGN 4 UNIT(S): 100 INJECTION, SOLUTION SUBCUTANEOUS at 22:10

## 2025-06-09 RX ADMIN — Medication 5 MILLIGRAM(S): at 05:46

## 2025-06-09 RX ADMIN — Medication 40 MILLIGRAM(S): at 05:46

## 2025-06-09 RX ADMIN — METOPROLOL SUCCINATE 50 MILLIGRAM(S): 50 TABLET, EXTENDED RELEASE ORAL at 05:46

## 2025-06-09 RX ADMIN — Medication 1 DOSE(S): at 20:42

## 2025-06-09 NOTE — PHYSICAL THERAPY INITIAL EVALUATION ADULT - SPECIFY REASON(S)
Pt. attempted to be seen for PT IE this PM. Pt. is currently off unit at dialysis, not available at this time. Will cont to f/u.

## 2025-06-09 NOTE — PHYSICAL THERAPY INITIAL EVALUATION ADULT - ADDITIONAL COMMENTS
per pt, up to ~1 wk ago, pt was able to transfer bed <> w/c independently and ambulated 50 ft with RW, CS during PT.   pt now requires Ax2 for OOBTC transfers.

## 2025-06-09 NOTE — PHYSICAL THERAPY INITIAL EVALUATION ADULT - LEVEL OF INDEPENDENCE: SIT/STAND, REHAB EVAL
pt unable to stand completely upright 2/2 buckling knees and impaired balance/maximum assist (25% patients effort)

## 2025-06-09 NOTE — PHYSICAL THERAPY INITIAL EVALUATION ADULT - GENERAL OBSERVATIONS, REHAB EVAL
[id number] pm. 64 y/o M from the [facility] rec'd/left in bed, nad. Pt is A+Ox4, follows VC's. s/p HD this AM. pt required max assist for bed mobility and transfers. reports no sensation distal to ankles in bilat feet. of note, pt with severe bilat hands intrinsic muscle atrophy - Dr Espino made aware. + LT heel ulcer. + LT Charcot deformity. vitals: in bed 135/71 106 97% on RA, sitting 146/72 69. per pt, he owns orthopedic shoes.

## 2025-06-09 NOTE — PROGRESS NOTE ADULT - SUBJECTIVE AND OBJECTIVE BOX
[facility] FOLLOW UP NOTE  --------------------------------------------------------------------------------  24 hour events/subjective: Patient examined during HD. Appears comfortable.    PAST HISTORY  --------------------------------------------------------------------------------  No significant changes to PMH, PSH, FHx, SHx, unless otherwise noted    ALLERGIES & MEDICATIONS  --------------------------------------------------------------------------------  Allergies    No Known Allergies    Standing Inpatient Medications  aspirin  chewable 81 milliGRAM(s) Oral daily  atorvastatin 40 milliGRAM(s) Oral at bedtime  bacitracin   Ointment 1 Application(s) Topical daily  chlorhexidine 2% Cloths 1 Application(s) Topical daily  clopidogrel Tablet 75 milliGRAM(s) Oral daily  cyanocobalamin 1000 MICROGram(s) Oral daily  dextrose 5%. 1000 milliLiter(s) IV Continuous <Continuous>  dextrose 5%. 1000 milliLiter(s) IV Continuous <Continuous>  dextrose 50% Injectable 25 Gram(s) IV Push once  dextrose 50% Injectable 12.5 Gram(s) IV Push once  dextrose 50% Injectable 25 Gram(s) IV Push once  fluticasone propionate/ salmeterol 250-50 MICROgram(s) Diskus 1 Dose(s) Inhalation two times a day  folic acid 1 milliGRAM(s) Oral daily  glucagon  Injectable 1 milliGRAM(s) IntraMuscular once  heparin   Injectable 5000 Unit(s) SubCutaneous every 12 hours  insulin glargine Injectable (LANTUS) 4 Unit(s) SubCutaneous at bedtime  metoclopramide 5 milliGRAM(s) Oral two times a day  metoprolol tartrate 50 milliGRAM(s) Oral two times a day  mupirocin 2% Nasal 1 Application(s) Both Nostrils two times a day  NIFEdipine XL 30 milliGRAM(s) Oral daily  pantoprazole    Tablet 40 milliGRAM(s) Oral before breakfast  senna 2 Tablet(s) Oral at bedtime  sevelamer carbonate 1600 milliGRAM(s) Oral three times a day with meals    PRN Inpatient Medications  acetaminophen     Tablet .. 650 milliGRAM(s) Oral every 6 hours PRN  aluminum hydroxide/magnesium hydroxide/simethicone Suspension 30 milliLiter(s) Oral every 4 hours PRN  dextrose Oral Gel 15 Gram(s) Oral once PRN  melatonin 3 milliGRAM(s) Oral at bedtime PRN  ondansetron Injectable 4 milliGRAM(s) IV Push every 8 hours PRN    VITALS/PHYSICAL EXAM  --------------------------------------------------------------------------------  T(C): 36.6 (06-09-25 @ 04:57), Max: 36.7 (06-08-25 @ 21:32)  HR: 66 (06-09-25 @ 11:30) (66 - 73)  BP: 140/66 (06-09-25 @ 11:30) (140/66 - 157/75)  RR: 18 (06-09-25 @ 11:30) (18 - 18)  SpO2: 94% (06-09-25 @ 04:57) (93% - 95%)    Physical Exam:  	Gen: NAD  	Pulm: CTA B/L  	CV: RRR, S1S2  	Abd: +BS, soft, nontender/nondistended  	: No suprapubic tenderness  	LE: Warm, no edema  	Vascular access: AVF    LABS/STUDIES  --------------------------------------------------------------------------------              10.1   9.13  >-----------<  217      [06-09-25 @ 07:43]              30.7     137  |  97  |  84  ----------------------------<  78      [06-09-25 @ 07:43]  5.1   |  26  |  5.9        Ca     9.1     [06-09-25 @ 07:43]    TPro  7.4  /  Alb  3.9  /  TBili  0.3  /  DBili  x   /  AST  13  /  ALT  10  /  AlkPhos  136  [06-09-25 @ 07:43]    Creatinine Trend:  SCr 5.9 [06-09 @ 07:43]  SCr 5.1 [06-08 @ 07:23]  SCr 3.8 [06-07 @ 07:32]  SCr 4.1 [06-06 @ 06:56]  SCr 4.4 [06-05 @ 06:04]    Urinalysis - [06-09-25 @ 07:43]      Color  / Appearance  / SG  / pH       Gluc 78 / Ketone   / Bili  / Urobili        Blood  / Protein  / Leuk Est  / Nitrite       RBC  / WBC  / Hyaline  / Gran  / Sq Epi  / Non Sq Epi  / Bacteria     Iron 93, TIBC --, %sat --      [10-01-24 @ 07:00]  Ferritin 1129      [10-01-24 @ 07:00]  PTH -- (Ca 7.8)      [09-24-24 @ 06:56]   135  Vitamin D (25OH) 15      [09-25-24 @ 06:49]  Lipid: chol 81, TG 52, HDL 43, LDL --      [06-02-25 @ 06:24]

## 2025-06-09 NOTE — PROGRESS NOTE ADULT - ASSESSMENT
#T2DM with hypoglycemia  - Fingersticks are occasionally on lower end, patient noted to be eating.  - Insulin being titrated down, now on 4U Lantus.  - Continue monitoring and adjust as needed.    #ESRD on HD  #Volume overload: improving, likely due to above  - Improved with HD here, plan for d/c on Monday  - Minimal urine output - stopped Lasix.  - Started on sevelamer 1600 TID per nephro.    #Left Superficial Heel Ulcer  #Diabetic foot ulcer with Charcot Deformity  #PAD   -Foot x ray showed Stable severe Charcot arthropathy with loss of normal plantar arch. Severe midfoot and forefoot degenerative changes  -Arterial duplex showed The right posterior tibial artery is occluded. Moderate stenosis of the right dorsalis pedis artery. Left posterior tibial and peroneal arteries are occluded.  -Seen by podiatry and Infectious disease, wound care, no need for antibiotics, WBAT.   -Follow up with Vascular outpatient.     #CAD sp CABG  -Continue ASA, Plavix Lipitor.     #DVT: Heparin  #GI: pantoprazole     #DIET: DASH/TLC, renal  #DVTppx: AQH  #GIppx: Pantoprazole  #Activity: Increase as tolerated; patient states that at baseline he uses a walker and a wheelchair.  #CODE: FULL  #Disposition: [facility] pending PT wendy.

## 2025-06-09 NOTE — PROGRESS NOTE ADULT - SUBJECTIVE AND OBJECTIVE BOX
24H events:    Patient is a 65y old Male who presents with a chief complaint of Volume Overload (06 Jun 2025 12:47)    Primary diagnosis of Pressure injury of skin of foot    Today is hospital day 7d. This morning patient was seen and examined at bedside, sitting comfortably in chair, eating breakfast.   No acute or major events overnight.    The patient states he feels significantly better relative to when he came in. Reports his foot has healed. Denies any other new issues.    Code Status:  Full      PAST MEDICAL & SURGICAL HISTORY  S/P CABG (coronary artery bypass graft)  x4    Diabetes mellitus    Transient ischemic attack (TIA)  2017; 2008    Chronic kidney disease (CKD)  Stage IV    Hypertension    Stented coronary artery  in 2008    Myocardial infarction  2012    PAD (peripheral artery disease)  S/p bypass left leg    HLD (hyperlipidemia)    BPH (benign prostatic hyperplasia)    Pain in left knee  s/p fall    OA (osteoarthritis)    [place] (hard of hearing)    Chronic anemia    History of medical problems  left arm precaution    S/P CABG (coronary artery bypass graft)  2012    H/O arterial bypass of lower limb  Left Lower Extremity (2016)    History of surgery  Left CEA (2017)  Left Pinkie toe Amputation (2014)  CABG x 4 (2012)  Card cath - stent (2008)      AV fistula  2019  LEFT AV FISTULA      SOCIAL HISTORY:  Social History:      ALLERGIES:  No Known Allergies    MEDICATIONS:  STANDING MEDICATIONS  aspirin  chewable 81 milliGRAM(s) Oral daily  atorvastatin 40 milliGRAM(s) Oral at bedtime  bacitracin   Ointment 1 Application(s) Topical daily  chlorhexidine 2% Cloths 1 Application(s) Topical daily  clopidogrel Tablet 75 milliGRAM(s) Oral daily  cyanocobalamin 1000 MICROGram(s) Oral daily  dextrose 5%. 1000 milliLiter(s) IV Continuous <Continuous>  dextrose 5%. 1000 milliLiter(s) IV Continuous <Continuous>  dextrose 50% Injectable 25 Gram(s) IV Push once  dextrose 50% Injectable 12.5 Gram(s) IV Push once  dextrose 50% Injectable 25 Gram(s) IV Push once  fluticasone propionate/ salmeterol 250-50 MICROgram(s) Diskus 1 Dose(s) Inhalation two times a day  folic acid 1 milliGRAM(s) Oral daily  glucagon  Injectable 1 milliGRAM(s) IntraMuscular once  heparin   Injectable 5000 Unit(s) SubCutaneous every 12 hours  insulin glargine Injectable (LANTUS) 4 Unit(s) SubCutaneous at bedtime  metoclopramide 5 milliGRAM(s) Oral two times a day  metoprolol tartrate 50 milliGRAM(s) Oral two times a day  mupirocin 2% Nasal 1 Application(s) Both Nostrils two times a day  NIFEdipine XL 30 milliGRAM(s) Oral daily  pantoprazole    Tablet 40 milliGRAM(s) Oral before breakfast  senna 2 Tablet(s) Oral at bedtime  sevelamer carbonate 1600 milliGRAM(s) Oral three times a day with meals    PRN MEDICATIONS  acetaminophen     Tablet .. 650 milliGRAM(s) Oral every 6 hours PRN  aluminum hydroxide/magnesium hydroxide/simethicone Suspension 30 milliLiter(s) Oral every 4 hours PRN  dextrose Oral Gel 15 Gram(s) Oral once PRN  melatonin 3 milliGRAM(s) Oral at bedtime PRN  ondansetron Injectable 4 milliGRAM(s) IV Push every 8 hours PRN    VITALS:   T(F): 97.9  HR: 66  BP: 140/66  RR: 18  SpO2: 94%    PHYSICAL EXAM:  GENERAL:   ( X ) NAD, sitting in chair comfortably     (  ) obtunded     (  ) lethargic     (  ) somnolent    HEART:  Rate -->     ( X ) normal rate     (  ) bradycardic     (  ) tachycardic  Rhythm -->     ( X ) regular     (  ) regularly irregular     (  ) irregularly irregular  Murmurs -->     ( X ) normal s1s2     (  ) systolic murmur     (  ) diastolic murmur     (  ) continuous murmur      (  ) S3 present     (  ) S4 present    LUNGS:   ( X ) Unlabored respirations     (  ) tachypnea  ( X ) B/L air entry     (  ) decreased breath sounds in:  (location     )    ( X ) no adventitious sound     (  ) crackles     (  ) wheezing      (  ) rhonchi      (specify location:       )  (  ) chest wall tenderness (specify location:       )    ABDOMEN:   ( X ) Soft     (  ) tense   |   ( X ) nondistended     (  ) distended   |   (  ) +BS     (  ) hypoactive bowel sounds     (  ) hyperactive bowel sounds  ( X ) nontender     (  ) RUQ tenderness     (  ) RLQ tenderness     (  ) LLQ tenderness     (  ) epigastric tenderness     (  ) diffuse tenderness  (  ) Splenomegaly      (  ) Hepatomegaly      (  ) Jaundice     (  ) ecchymosis     EXTREMITIES:    No edema  Noted b/l heels with pressure-offloading dressing. Non tender.    NERVOUS SYSTEM:    ( X ) A&Ox3     (  ) confused     (  ) lethargic  CN II-XII:     ( X ) Intact     (  ) deficits found     (Specify:     )   Upper extremities:     ( X ) no sensorimotor deficits     (  ) weakness     (  ) loss of proprioception/vibration     (  ) loss of touch/temperature (specify:    )  Lower extremities:     ( X ) no sensorimotor deficits     (  ) weakness     (  ) loss of proprioception/vibration     (  ) loss of touch/temperature (specify:    )    LABS:                        10.1   9.13  )-----------( 217      ( 09 Jun 2025 07:43 )             30.7     06-09    137  |  97[L]  |  84[HH]  ----------------------------<  78  5.1[H]   |  26  |  5.9[HH]    Ca    9.1      09 Jun 2025 07:43    TPro  7.4  /  Alb  3.9  /  TBili  0.3  /  DBili  x   /  AST  13  /  ALT  10  /  AlkPhos  136[H]  06-09

## 2025-06-09 NOTE — PHYSICAL THERAPY INITIAL EVALUATION ADULT - PERTINENT HX OF CURRENT PROBLEM, REHAB EVAL
Patient is a 65y old Male who presents with a chief complaint of Volume Overload (06 Jun 2025 12:47)    Primary diagnosis of Pressure injury of skin of foot

## 2025-06-09 NOTE — PROGRESS NOTE ADULT - ASSESSMENT
ESRD on HD MWF at [facility] [place]  L heel ulcer  DM  BPH  CAD status post CABG  Hypertension  TIA  Peripheral arterial disease    Plan:    HD today: 3 hours, opti 160 dialyzer, 2K bath, 3.5L UF  Renally dose antibiotics  Sevelamer with meals  Renal diet  Fluid restriction

## 2025-06-10 LAB
ANION GAP SERPL CALC-SCNC: 14 MMOL/L — SIGNIFICANT CHANGE UP (ref 7–14)
BUN SERPL-MCNC: 67 MG/DL — CRITICAL HIGH (ref 10–20)
CALCIUM SERPL-MCNC: 8.8 MG/DL — SIGNIFICANT CHANGE UP (ref 8.4–10.5)
CHLORIDE SERPL-SCNC: 96 MMOL/L — LOW (ref 98–110)
CO2 SERPL-SCNC: 27 MMOL/L — SIGNIFICANT CHANGE UP (ref 17–32)
CREAT SERPL-MCNC: 5.5 MG/DL — CRITICAL HIGH (ref 0.7–1.5)
EGFR: 11 ML/MIN/1.73M2 — LOW
EGFR: 11 ML/MIN/1.73M2 — LOW
GLUCOSE SERPL-MCNC: 122 MG/DL — HIGH (ref 70–99)
POTASSIUM SERPL-MCNC: 4.6 MMOL/L — SIGNIFICANT CHANGE UP (ref 3.5–5)
POTASSIUM SERPL-SCNC: 4.6 MMOL/L — SIGNIFICANT CHANGE UP (ref 3.5–5)
SODIUM SERPL-SCNC: 137 MMOL/L — SIGNIFICANT CHANGE UP (ref 135–146)

## 2025-06-10 PROCEDURE — 99232 SBSQ HOSP IP/OBS MODERATE 35: CPT

## 2025-06-10 PROCEDURE — 99231 SBSQ HOSP IP/OBS SF/LOW 25: CPT

## 2025-06-10 RX ORDER — POLYETHYLENE GLYCOL 3350 17 G/17G
17 POWDER, FOR SOLUTION ORAL EVERY 12 HOURS
Refills: 0 | Status: DISCONTINUED | OUTPATIENT
Start: 2025-06-10 | End: 2025-06-13

## 2025-06-10 RX ADMIN — METOPROLOL SUCCINATE 50 MILLIGRAM(S): 50 TABLET, EXTENDED RELEASE ORAL at 05:53

## 2025-06-10 RX ADMIN — SEVELAMER HYDROCHLORIDE 1600 MILLIGRAM(S): 800 TABLET ORAL at 08:24

## 2025-06-10 RX ADMIN — Medication 40 MILLIGRAM(S): at 05:53

## 2025-06-10 RX ADMIN — HEPARIN SODIUM 5000 UNIT(S): 1000 INJECTION INTRAVENOUS; SUBCUTANEOUS at 17:36

## 2025-06-10 RX ADMIN — FOLIC ACID 1 MILLIGRAM(S): 1 TABLET ORAL at 11:58

## 2025-06-10 RX ADMIN — CYANOCOBALAMIN 1000 MICROGRAM(S): 1000 INJECTION INTRAMUSCULAR; SUBCUTANEOUS at 11:57

## 2025-06-10 RX ADMIN — Medication 5 MILLIGRAM(S): at 17:36

## 2025-06-10 RX ADMIN — METOPROLOL SUCCINATE 50 MILLIGRAM(S): 50 TABLET, EXTENDED RELEASE ORAL at 17:37

## 2025-06-10 RX ADMIN — SEVELAMER HYDROCHLORIDE 1600 MILLIGRAM(S): 800 TABLET ORAL at 11:57

## 2025-06-10 RX ADMIN — MUPIROCIN CALCIUM 1 APPLICATION(S): 20 CREAM TOPICAL at 17:36

## 2025-06-10 RX ADMIN — ATORVASTATIN CALCIUM 40 MILLIGRAM(S): 80 TABLET, FILM COATED ORAL at 22:04

## 2025-06-10 RX ADMIN — Medication 2 TABLET(S): at 22:04

## 2025-06-10 RX ADMIN — POLYETHYLENE GLYCOL 3350 17 GRAM(S): 17 POWDER, FOR SOLUTION ORAL at 09:50

## 2025-06-10 RX ADMIN — Medication 5 MILLIGRAM(S): at 05:53

## 2025-06-10 RX ADMIN — MUPIROCIN CALCIUM 1 APPLICATION(S): 20 CREAM TOPICAL at 05:53

## 2025-06-10 RX ADMIN — Medication 1 DOSE(S): at 08:25

## 2025-06-10 RX ADMIN — INSULIN GLARGINE-YFGN 4 UNIT(S): 100 INJECTION, SOLUTION SUBCUTANEOUS at 22:04

## 2025-06-10 RX ADMIN — HEPARIN SODIUM 5000 UNIT(S): 1000 INJECTION INTRAVENOUS; SUBCUTANEOUS at 05:53

## 2025-06-10 RX ADMIN — Medication 1 APPLICATION(S): at 11:57

## 2025-06-10 RX ADMIN — CLOPIDOGREL BISULFATE 75 MILLIGRAM(S): 75 TABLET, FILM COATED ORAL at 11:57

## 2025-06-10 RX ADMIN — Medication 1 APPLICATION(S): at 12:00

## 2025-06-10 RX ADMIN — Medication 81 MILLIGRAM(S): at 11:57

## 2025-06-10 RX ADMIN — Medication 1 DOSE(S): at 20:30

## 2025-06-10 RX ADMIN — Medication 30 MILLIGRAM(S): at 05:55

## 2025-06-10 RX ADMIN — POLYETHYLENE GLYCOL 3350 17 GRAM(S): 17 POWDER, FOR SOLUTION ORAL at 17:35

## 2025-06-10 RX ADMIN — SEVELAMER HYDROCHLORIDE 1600 MILLIGRAM(S): 800 TABLET ORAL at 17:36

## 2025-06-10 NOTE — PROGRESS NOTE ADULT - ASSESSMENT
#T2DM with hypoglycemia  - Fingersticks are occasionally on lower end, patient noted to be eating.  - Insulin being titrated down, now on 4U Lantus. --> continue and monitor.  - Continue monitoring and adjust as needed.    #ESRD on HD  #Volume overload: improving, likely due to above  - Improved with HD here  - Minimal urine output - stopped Lasix.  - Started on sevelamer 1600 TID per nephro.    #Left Superficial Heel Ulcer  #Diabetic foot ulcer with Charcot Deformity  #PAD   -Foot x ray showed Stable severe Charcot arthropathy with loss of normal plantar arch. Severe midfoot and forefoot degenerative changes  -Arterial duplex showed The right posterior tibial artery is occluded. Moderate stenosis of the right dorsalis pedis artery. Left posterior tibial and peroneal arteries are occluded.  -Seen by podiatry and Infectious disease, wound care, no need for antibiotics, WBAT.   -Follow up with Vascular outpatient (has an appointment on Friday 6/13 at 10 AM with Dr. Malik, vascular surgery [address] [place] [address], 3rd floor).    #AMbulatory dysfunction  - Patient reports that previously patient was brooke to ambulate with walker and intermittently used wheelchair.  - PT eval 6/9 noted that he was max assist and unable to ambulate (or even stand safely).  - Likely deconditioning versus advancing neuropathy.  - Outpatient neurology for further eval.  - Rehab placement planning.    #Constipation  - Patient reported 3 days of no BM on 6/10.  - On Senna, adding Miralax BID.  - F/u stool counts.    #CAD sp CABG  -Continue ASA, Plavix Lipitor.     #DVT: Heparin  #GI: pantoprazole     #DIET: DASH/TLC, renal  #DVTppx: AQH  #GIppx: Pantoprazole  #Activity: Increase as tolerated; patient states that at baseline he uses a walker and a wheelchair.  #CODE: FULL  #Disposition: Rehab

## 2025-06-10 NOTE — PROGRESS NOTE ADULT - SUBJECTIVE AND OBJECTIVE BOX
[facility] FOLLOW UP NOTE  --------------------------------------------------------------------------------  24 hour events/subjective: Patient examined. Appears comfortable.    PAST HISTORY  --------------------------------------------------------------------------------  No significant changes to PMH, PSH, FHx, SHx, unless otherwise noted    ALLERGIES & MEDICATIONS  --------------------------------------------------------------------------------  Allergies    No Known Allergies    Standing Inpatient Medications  aspirin  chewable 81 milliGRAM(s) Oral daily  atorvastatin 40 milliGRAM(s) Oral at bedtime  bacitracin   Ointment 1 Application(s) Topical daily  chlorhexidine 2% Cloths 1 Application(s) Topical daily  clopidogrel Tablet 75 milliGRAM(s) Oral daily  cyanocobalamin 1000 MICROGram(s) Oral daily  dextrose 5%. 1000 milliLiter(s) IV Continuous <Continuous>  dextrose 5%. 1000 milliLiter(s) IV Continuous <Continuous>  dextrose 50% Injectable 25 Gram(s) IV Push once  dextrose 50% Injectable 12.5 Gram(s) IV Push once  dextrose 50% Injectable 25 Gram(s) IV Push once  fluticasone propionate/ salmeterol 250-50 MICROgram(s) Diskus 1 Dose(s) Inhalation two times a day  folic acid 1 milliGRAM(s) Oral daily  glucagon  Injectable 1 milliGRAM(s) IntraMuscular once  heparin   Injectable 5000 Unit(s) SubCutaneous every 12 hours  insulin glargine Injectable (LANTUS) 4 Unit(s) SubCutaneous at bedtime  metoclopramide 5 milliGRAM(s) Oral two times a day  metoprolol tartrate 50 milliGRAM(s) Oral two times a day  mupirocin 2% Nasal 1 Application(s) Both Nostrils two times a day  NIFEdipine XL 30 milliGRAM(s) Oral daily  pantoprazole    Tablet 40 milliGRAM(s) Oral before breakfast  polyethylene glycol 3350 17 Gram(s) Oral every 12 hours  senna 2 Tablet(s) Oral at bedtime  sevelamer carbonate 1600 milliGRAM(s) Oral three times a day with meals    PRN Inpatient Medications  acetaminophen     Tablet .. 650 milliGRAM(s) Oral every 6 hours PRN  aluminum hydroxide/magnesium hydroxide/simethicone Suspension 30 milliLiter(s) Oral every 4 hours PRN  dextrose Oral Gel 15 Gram(s) Oral once PRN  melatonin 3 milliGRAM(s) Oral at bedtime PRN  ondansetron Injectable 4 milliGRAM(s) IV Push every 8 hours PRN    VITALS/PHYSICAL EXAM  --------------------------------------------------------------------------------  T(C): 37.1 (06-10-25 @ 05:28), Max: 37.2 (06-09-25 @ 19:41)  HR: 69 (06-10-25 @ 05:28) (68 - 81)  BP: 169/70 (06-10-25 @ 05:28) (126/60 - 169/70)  RR: 18 (06-10-25 @ 08:12) (18 - 18)  SpO2: 96% (06-10-25 @ 08:12) (96% - 96%)    06-09-25 @ 07:01  -  06-10-25 @ 07:00  --------------------------------------------------------  IN: 0 mL / OUT: 3000 mL / NET: -3000 mL    Physical Exam:  	Gen: NAD  	Pulm: CTA B/L  	CV: RRR, S1S2  	Abd: +BS, soft, nontender/nondistended  	: No suprapubic tenderness  	LE: Warm, no edema  	Vascular access: AVF    LABS/STUDIES  --------------------------------------------------------------------------------              10.1   9.13  >-----------<  217      [06-09-25 @ 07:43]              30.7     137  |  97  |  84  ----------------------------<  78      [06-09-25 @ 07:43]  5.1   |  26  |  5.9        Ca     9.1     [06-09-25 @ 07:43]    TPro  7.4  /  Alb  3.9  /  TBili  0.3  /  DBili  x   /  AST  13  /  ALT  10  /  AlkPhos  136  [06-09-25 @ 07:43]    Creatinine Trend:  SCr 5.9 [06-09 @ 07:43]  SCr 5.1 [06-08 @ 07:23]  SCr 3.8 [06-07 @ 07:32]  SCr 4.1 [06-06 @ 06:56]  SCr 4.4 [06-05 @ 06:04]    Urinalysis - [06-09-25 @ 07:43]      Color  / Appearance  / SG  / pH       Gluc 78 / Ketone   / Bili  / Urobili        Blood  / Protein  / Leuk Est  / Nitrite       RBC  / WBC  / Hyaline  / Gran  / Sq Epi  / Non Sq Epi  / Bacteria     Iron 93, TIBC --, %sat --      [10-01-24 @ 07:00]  Ferritin 1129      [10-01-24 @ 07:00]  PTH -- (Ca 7.8)      [09-24-24 @ 06:56]   135  Vitamin D (25OH) 15      [09-25-24 @ 06:49]  Lipid: chol 81, TG 52, HDL 43, LDL --      [06-02-25 @ 06:24]

## 2025-06-10 NOTE — PROGRESS NOTE ADULT - SUBJECTIVE AND OBJECTIVE BOX
PROGRESS NOTE   Pt seen @ bedside during AM rounds    Vital Signs Last 24 Hrs  T(C): 36.7 (10 Rakan 2025 12:46), Max: 37.2 (09 Jun 2025 19:41)  T(F): 98 (10 Rakan 2025 12:46), Max: 98.9 (09 Jun 2025 19:41)  HR: 61 (10 Rakan 2025 12:46) (61 - 81)  BP: 154/73 (10 Rakan 2025 12:46) (126/60 - 169/70)  BP(mean): --  RR: 18 (10 Rakan 2025 12:46) (18 - 18)  SpO2: 96% (10 Rakan 2025 12:46) (96% - 96%)    Parameters below as of 10 Rakan 2025 08:12  Patient On (Oxygen Delivery Method): room air                              10.1   9.13  )-----------( 217      ( 09 Jun 2025 07:43 )             30.7               06-10    137  |  96[L]  |  67[HH]  ----------------------------<  122[H]  4.6   |  27  |  5.5[HH]    Ca    8.8      10 Rakan 2025 11:33    TPro  7.4  /  Alb  3.9  /  TBili  0.3  /  DBili  x   /  AST  13  /  ALT  10  /  AlkPhos  136[H]  06-09        Physical Exam - Lower Extremity Focused:   Derm:  Superficial Pressure ulcers detected on plantar surface of left hindfoot and dry blister at distal tip of left hallux, No signs of erythema, no signs of active drainage no clinical signs of infection  Vascular: DP and PT Pulses diminished; Foot is Warm to Warm to the touch; Capillary Refill Time < 3 Seconds;   Neuro: Protective Sensation Diminished / Moderately Neuropathic   MSK: L 5th toe amp. Rockerbottom deformity L foot     Assessment:  Superficial Pressure Ulcers, Left foot - improving, stable, no sings of infection    Diabetic Foot with Charcot deformity chronic - stable     Plan:  Chart reviewed and Patient evaluated. All Questions and Concerns Addressed and Answered  Wound Care Dressings: Q24h- Bacitracin w/ Allevyne Pad to Left Heel and Bacitracin w/ Bandaid to Left Hallux  Weight Bearing Status; WBAT B/L Feet, recommend supportive boot to the L foot (patient states that he has the boot at home)   off loading B/L feet  Apply Allevyn pads to L foot   F/u as o/p     Podiatry

## 2025-06-10 NOTE — PROGRESS NOTE ADULT - ASSESSMENT
ESRD on HD MWF at [facility]  L heel ulcer  DM  BPH  CAD status post CABG  Hypertension  TIA  Peripheral arterial disease    Plan:    HD tomorrow: 3 hours, opti 160 dialyzer, 2K bath, 3.5L UF  Renally dose antibiotics  Sevelamer with meals  Renal diet  Fluid restriction

## 2025-06-10 NOTE — PROGRESS NOTE ADULT - SUBJECTIVE AND OBJECTIVE BOX
24H events:    Patient is a 65y old Male who presents with a chief complaint of Volume Overload (06 Jun 2025 12:47)    Primary diagnosis of Pressure injury of skin of foot    Today is hospital day 8d. This morning patient was seen and examined at bedside, resting comfortably in bed.    No acute or major events overnight.    The patient reports that he generally feels weak after hemodialysis. He feels better today. Denies any new issues. Reports last BM to be 3 days ago.    Code Status:  Full    PAST MEDICAL & SURGICAL HISTORY  S/P CABG (coronary artery bypass graft)  x4    Diabetes mellitus    Transient ischemic attack (TIA)  2017; 2008    Chronic kidney disease (CKD)  Stage IV    Hypertension    Stented coronary artery  in 2008    Myocardial infarction  2012    PAD (peripheral artery disease)  S/p bypass left leg    HLD (hyperlipidemia)    BPH (benign prostatic hyperplasia)    Pain in left knee  s/p fall    OA (osteoarthritis)    [place] (hard of hearing)    Chronic anemia    History of medical problems  left arm precaution    S/P CABG (coronary artery bypass graft)  2012    H/O arterial bypass of lower limb  Left Lower Extremity (2016)    History of surgery  Left CEA (2017)  Left Pinkie toe Amputation (2014)  CABG x 4 (2012)  Card cath - stent (2008)      AV fistula  2019  LEFT AV FISTULA      SOCIAL HISTORY:  Social History:      ALLERGIES:  No Known Allergies    MEDICATIONS:  STANDING MEDICATIONS  aspirin  chewable 81 milliGRAM(s) Oral daily  atorvastatin 40 milliGRAM(s) Oral at bedtime  bacitracin   Ointment 1 Application(s) Topical daily  chlorhexidine 2% Cloths 1 Application(s) Topical daily  clopidogrel Tablet 75 milliGRAM(s) Oral daily  cyanocobalamin 1000 MICROGram(s) Oral daily  dextrose 5%. 1000 milliLiter(s) IV Continuous <Continuous>  dextrose 5%. 1000 milliLiter(s) IV Continuous <Continuous>  dextrose 50% Injectable 25 Gram(s) IV Push once  dextrose 50% Injectable 12.5 Gram(s) IV Push once  dextrose 50% Injectable 25 Gram(s) IV Push once  fluticasone propionate/ salmeterol 250-50 MICROgram(s) Diskus 1 Dose(s) Inhalation two times a day  folic acid 1 milliGRAM(s) Oral daily  glucagon  Injectable 1 milliGRAM(s) IntraMuscular once  heparin   Injectable 5000 Unit(s) SubCutaneous every 12 hours  insulin glargine Injectable (LANTUS) 4 Unit(s) SubCutaneous at bedtime  metoclopramide 5 milliGRAM(s) Oral two times a day  metoprolol tartrate 50 milliGRAM(s) Oral two times a day  mupirocin 2% Nasal 1 Application(s) Both Nostrils two times a day  NIFEdipine XL 30 milliGRAM(s) Oral daily  pantoprazole    Tablet 40 milliGRAM(s) Oral before breakfast  polyethylene glycol 3350 17 Gram(s) Oral every 12 hours  senna 2 Tablet(s) Oral at bedtime  sevelamer carbonate 1600 milliGRAM(s) Oral three times a day with meals    PRN MEDICATIONS  acetaminophen     Tablet .. 650 milliGRAM(s) Oral every 6 hours PRN  aluminum hydroxide/magnesium hydroxide/simethicone Suspension 30 milliLiter(s) Oral every 4 hours PRN  dextrose Oral Gel 15 Gram(s) Oral once PRN  melatonin 3 milliGRAM(s) Oral at bedtime PRN  ondansetron Injectable 4 milliGRAM(s) IV Push every 8 hours PRN    VITALS:   T(F): 98.8  HR: 69  BP: 169/70  RR: 18  SpO2: 96%    PHYSICAL EXAM:  GENERAL:   ( X ) NAD, sitting in chair comfortably     (  ) obtunded     (  ) lethargic     (  ) somnolent    HEART:  Rate -->     ( X ) normal rate     (  ) bradycardic     (  ) tachycardic  Rhythm -->     ( X ) regular     (  ) regularly irregular     (  ) irregularly irregular  Murmurs -->     ( X ) normal s1s2     (  ) systolic murmur     (  ) diastolic murmur     (  ) continuous murmur      (  ) S3 present     (  ) S4 present    LUNGS:   ( X ) Unlabored respirations     (  ) tachypnea  ( X ) B/L air entry     (  ) decreased breath sounds in:  (location     )    ( X ) no adventitious sound     (  ) crackles     (  ) wheezing      (  ) rhonchi      (specify location:       )  (  ) chest wall tenderness (specify location:       )    ABDOMEN:   ( X ) Soft     (  ) tense   |   ( X ) nondistended     (  ) distended   |   (  ) +BS     (  ) hypoactive bowel sounds     (  ) hyperactive bowel sounds  ( X ) nontender     (  ) RUQ tenderness     (  ) RLQ tenderness     (  ) LLQ tenderness     (  ) epigastric tenderness     (  ) diffuse tenderness  (  ) Splenomegaly      (  ) Hepatomegaly      (  ) Jaundice     (  ) ecchymosis     EXTREMITIES:    No edema  Noted b/l heels with pressure-offloading dressing. Non tender.    NERVOUS SYSTEM:    ( X ) A&Ox3     (  ) confused     (  ) lethargic  CN II-XII:     ( X ) Intact     (  ) deficits found     (Specify:     )   Upper extremities:     ( X ) no sensorimotor deficits     (  ) weakness     (  ) loss of proprioception/vibration     (  ) loss of touch/temperature (specify:    )  Lower extremities:     ( X ) no sensorimotor deficits     (  ) weakness     (  ) loss of proprioception/vibration     (  ) loss of touch/temperature (specify:    )    LABS:                        10.1   9.13  )-----------( 217      ( 09 Jun 2025 07:43 )             30.7     06-09    137  |  97[L]  |  84[HH]  ----------------------------<  78  5.1[H]   |  26  |  5.9[HH]    Ca    9.1      09 Jun 2025 07:43    TPro  7.4  /  Alb  3.9  /  TBili  0.3  /  DBili  x   /  AST  13  /  ALT  10  /  AlkPhos  136[H]  06-09

## 2025-06-11 LAB
HAV IGM SER-ACNC: SIGNIFICANT CHANGE UP
HBV CORE IGM SER-ACNC: SIGNIFICANT CHANGE UP
HBV SURFACE AB SER-ACNC: 506.7 MIU/ML — SIGNIFICANT CHANGE UP
HBV SURFACE AB SER-ACNC: REACTIVE — SIGNIFICANT CHANGE UP
HBV SURFACE AG SER-ACNC: SIGNIFICANT CHANGE UP
HCV AB S/CO SERPL IA: 0.16 S/CO — SIGNIFICANT CHANGE UP (ref 0–0.79)
HCV AB SERPL-IMP: SIGNIFICANT CHANGE UP

## 2025-06-11 PROCEDURE — 99232 SBSQ HOSP IP/OBS MODERATE 35: CPT

## 2025-06-11 RX ORDER — LACTULOSE 10 G/15ML
20 SOLUTION ORAL
Refills: 0 | Status: COMPLETED | OUTPATIENT
Start: 2025-06-11 | End: 2025-06-11

## 2025-06-11 RX ORDER — BISACODYL 5 MG
5 TABLET, DELAYED RELEASE (ENTERIC COATED) ORAL ONCE
Refills: 0 | Status: COMPLETED | OUTPATIENT
Start: 2025-06-11 | End: 2025-06-11

## 2025-06-11 RX ADMIN — SEVELAMER HYDROCHLORIDE 1600 MILLIGRAM(S): 800 TABLET ORAL at 17:22

## 2025-06-11 RX ADMIN — POLYETHYLENE GLYCOL 3350 17 GRAM(S): 17 POWDER, FOR SOLUTION ORAL at 05:23

## 2025-06-11 RX ADMIN — Medication 5 MILLIGRAM(S): at 17:22

## 2025-06-11 RX ADMIN — Medication 5 MILLIGRAM(S): at 05:23

## 2025-06-11 RX ADMIN — FOLIC ACID 1 MILLIGRAM(S): 1 TABLET ORAL at 13:43

## 2025-06-11 RX ADMIN — Medication 40 MILLIGRAM(S): at 05:24

## 2025-06-11 RX ADMIN — METOPROLOL SUCCINATE 50 MILLIGRAM(S): 50 TABLET, EXTENDED RELEASE ORAL at 05:23

## 2025-06-11 RX ADMIN — ATORVASTATIN CALCIUM 40 MILLIGRAM(S): 80 TABLET, FILM COATED ORAL at 21:46

## 2025-06-11 RX ADMIN — Medication 1 APPLICATION(S): at 13:46

## 2025-06-11 RX ADMIN — SEVELAMER HYDROCHLORIDE 1600 MILLIGRAM(S): 800 TABLET ORAL at 13:44

## 2025-06-11 RX ADMIN — CLOPIDOGREL BISULFATE 75 MILLIGRAM(S): 75 TABLET, FILM COATED ORAL at 13:44

## 2025-06-11 RX ADMIN — METOPROLOL SUCCINATE 50 MILLIGRAM(S): 50 TABLET, EXTENDED RELEASE ORAL at 17:22

## 2025-06-11 RX ADMIN — INSULIN GLARGINE-YFGN 4 UNIT(S): 100 INJECTION, SOLUTION SUBCUTANEOUS at 21:46

## 2025-06-11 RX ADMIN — POLYETHYLENE GLYCOL 3350 17 GRAM(S): 17 POWDER, FOR SOLUTION ORAL at 17:21

## 2025-06-11 RX ADMIN — SEVELAMER HYDROCHLORIDE 1600 MILLIGRAM(S): 800 TABLET ORAL at 08:35

## 2025-06-11 RX ADMIN — Medication 2 TABLET(S): at 21:46

## 2025-06-11 RX ADMIN — Medication 1 DOSE(S): at 08:35

## 2025-06-11 RX ADMIN — Medication 1 APPLICATION(S): at 13:44

## 2025-06-11 RX ADMIN — Medication 81 MILLIGRAM(S): at 13:43

## 2025-06-11 RX ADMIN — HEPARIN SODIUM 5000 UNIT(S): 1000 INJECTION INTRAVENOUS; SUBCUTANEOUS at 17:21

## 2025-06-11 RX ADMIN — MUPIROCIN CALCIUM 1 APPLICATION(S): 20 CREAM TOPICAL at 05:23

## 2025-06-11 RX ADMIN — LACTULOSE 20 GRAM(S): 10 SOLUTION ORAL at 15:04

## 2025-06-11 RX ADMIN — Medication 30 MILLIGRAM(S): at 05:23

## 2025-06-11 RX ADMIN — Medication 5 MILLIGRAM(S): at 15:04

## 2025-06-11 RX ADMIN — HEPARIN SODIUM 5000 UNIT(S): 1000 INJECTION INTRAVENOUS; SUBCUTANEOUS at 05:23

## 2025-06-11 RX ADMIN — Medication 1 DOSE(S): at 20:30

## 2025-06-11 RX ADMIN — LACTULOSE 20 GRAM(S): 10 SOLUTION ORAL at 18:35

## 2025-06-11 RX ADMIN — CYANOCOBALAMIN 1000 MICROGRAM(S): 1000 INJECTION INTRAMUSCULAR; SUBCUTANEOUS at 13:43

## 2025-06-11 RX ADMIN — LACTULOSE 20 GRAM(S): 10 SOLUTION ORAL at 17:21

## 2025-06-11 NOTE — PROGRESS NOTE ADULT - SUBJECTIVE AND OBJECTIVE BOX
[place] [facility] FOLLOW UP NOTE  --------------------------------------------------------------------------------  24 hour events/subjective: Patient examined during HD. Appears comfortable.    PAST HISTORY  --------------------------------------------------------------------------------  No significant changes to PMH, PSH, FHx, SHx, unless otherwise noted    ALLERGIES & MEDICATIONS  --------------------------------------------------------------------------------  Allergies    No Known Allergies    Standing Inpatient Medications  aspirin  chewable 81 milliGRAM(s) Oral daily  atorvastatin 40 milliGRAM(s) Oral at bedtime  bacitracin   Ointment 1 Application(s) Topical daily  chlorhexidine 2% Cloths 1 Application(s) Topical daily  clopidogrel Tablet 75 milliGRAM(s) Oral daily  cyanocobalamin 1000 MICROGram(s) Oral daily  dextrose 5%. 1000 milliLiter(s) IV Continuous <Continuous>  dextrose 5%. 1000 milliLiter(s) IV Continuous <Continuous>  dextrose 50% Injectable 25 Gram(s) IV Push once  dextrose 50% Injectable 12.5 Gram(s) IV Push once  dextrose 50% Injectable 25 Gram(s) IV Push once  fluticasone propionate/ salmeterol 250-50 MICROgram(s) Diskus 1 Dose(s) Inhalation two times a day  folic acid 1 milliGRAM(s) Oral daily  glucagon  Injectable 1 milliGRAM(s) IntraMuscular once  heparin   Injectable 5000 Unit(s) SubCutaneous every 12 hours  insulin glargine Injectable (LANTUS) 4 Unit(s) SubCutaneous at bedtime  metoclopramide 5 milliGRAM(s) Oral two times a day  metoprolol tartrate 50 milliGRAM(s) Oral two times a day  NIFEdipine XL 30 milliGRAM(s) Oral daily  pantoprazole    Tablet 40 milliGRAM(s) Oral before breakfast  polyethylene glycol 3350 17 Gram(s) Oral every 12 hours  senna 2 Tablet(s) Oral at bedtime  sevelamer carbonate 1600 milliGRAM(s) Oral three times a day with meals    PRN Inpatient Medications  acetaminophen     Tablet .. 650 milliGRAM(s) Oral every 6 hours PRN  aluminum hydroxide/magnesium hydroxide/simethicone Suspension 30 milliLiter(s) Oral every 4 hours PRN  dextrose Oral Gel 15 Gram(s) Oral once PRN  melatonin 3 milliGRAM(s) Oral at bedtime PRN  ondansetron Injectable 4 milliGRAM(s) IV Push every 8 hours PRN    VITALS/PHYSICAL EXAM  --------------------------------------------------------------------------------  T(C): 36.9 (06-11-25 @ 04:45), Max: 36.9 (06-11-25 @ 04:45)  HR: 60 (06-11-25 @ 04:45) (60 - 72)  BP: 168/81 (06-11-25 @ 04:45) (135/69 - 168/81)  RR: 18 (06-11-25 @ 07:50) (18 - 18)  SpO2: 96% (06-11-25 @ 07:50) (95% - 97%)    Physical Exam:  	Gen: NAD  	Pulm: CTA B/L  	CV: RRR, S1S2  	Abd: +BS, soft, nontender/nondistended  	: No suprapubic tenderness  	LE: Warm, no edema  	Vascular access: AVF    LABS/STUDIES  --------------------------------------------------------------------------------    137  |  96  |  67  ----------------------------<  122      [06-10-25 @ 11:33]  4.6   |  27  |  5.5        Ca     8.8     [06-10-25 @ 11:33]    Creatinine Trend:  SCr 5.5 [06-10 @ 11:33]  SCr 5.9 [06-09 @ 07:43]  SCr 5.1 [06-08 @ 07:23]  SCr 3.8 [06-07 @ 07:32]  SCr 4.1 [06-06 @ 06:56]    Urinalysis - [06-10-25 @ 11:33]      Color  / Appearance  / SG  / pH       Gluc 122 / Ketone   / Bili  / Urobili        Blood  / Protein  / Leuk Est  / Nitrite       RBC  / WBC  / Hyaline  / Gran  / Sq Epi  / Non Sq Epi  / Bacteria     Iron 93, TIBC --, %sat --      [10-01-24 @ 07:00]  Ferritin 1129      [10-01-24 @ 07:00]  PTH -- (Ca 7.8)      [09-24-24 @ 06:56]   135  Vitamin D (25OH) 15      [09-25-24 @ 06:49]  Lipid: chol 81, TG 52, HDL 43, LDL --      [06-02-25 @ 06:24]    HBsAb 506.7      [06-10-25 @ 11:33]  HBsAb Reactive      [06-10-25 @ 11:33]  HBsAg Nonreact      [06-10-25 @ 11:33]  HCV 0.16, Nonreact      [06-10-25 @ 11:33]

## 2025-06-11 NOTE — PROGRESS NOTE ADULT - ASSESSMENT
#T2DM with hypoglycemia  - Fingersticks are occasionally on lower end, patient noted to be eating.  - Insulin being titrated down, now on 4U Lantus. --> continue and monitor.    #ESRD on HD  #Volume overload: improving, likely due to above  - Improved with HD here  - Minimal urine output - stopped Lasix.  - Started on sevelamer 1600 TID per nephro.    #Left Superficial Heel Ulcer  #Diabetic foot ulcer with Charcot Deformity  #PAD   -Foot x ray showed Stable severe Charcot arthropathy with loss of normal plantar arch. Severe midfoot and forefoot degenerative changes  -Arterial duplex showed The right posterior tibial artery is occluded. Moderate stenosis of the right dorsalis pedis artery. Left posterior tibial and peroneal arteries are occluded.  -Seen by podiatry and Infectious disease, wound care, no need for antibiotics, WBAT.   -Follow up with Vascular outpatient (has an appointment on Friday 6/13 at 10 AM with Dr. Malik, vascular surgery [address] [place] [address], 3rd floor).    #AMbulatory dysfunction  - Patient reports that previously patient was brooke to ambulate with walker and intermittently used wheelchair.  - PT eval 6/9 noted that he was max assist and unable to ambulate (or even stand safely).  - Likely deconditioning versus advancing neuropathy.  - Outpatient neurology for further eval.    #Constipation  - Patient reported 4 days of no BM on 6/11.  - On Senna, added Miralax BID on 6/10, giving Dulcolax 5mg x 1 and PO lactulose 20g x 3 on 6/11.  - F/u stool counts.    #CAD sp CABG  -Continue ASA, Plavix Lipitor.     #DVT: Heparin  #GI: pantoprazole     #DIET: DASH/TLC, renal  #DVTppx: SQH  #GIppx: Pantoprazole  #Activity: Increase as tolerated; patient states that at baseline he uses a walker and a wheelchair.  #CODE: FULL  #Disposition: Rehab vs [facility] pending PT re-eval.

## 2025-06-11 NOTE — PROGRESS NOTE ADULT - TIME BILLING
Patient seen at bedside, total time spent to evaluate and treat the patient's acute illness and chronic medical conditions as well as time spent reviewing prior records, labs, radiology, documenting in electronic medical records, discussing plan with medical team was 25 minutes with >50% of time spent face to face with patient, discussing with patient/family as well as coordination of care.

## 2025-06-11 NOTE — PROGRESS NOTE ADULT - ASSESSMENT
ESRD on HD MWF at [facility]  L heel ulcer  DM  BPH  CAD status post CABG  Hypertension  TIA  Peripheral arterial disease    Plan:    HD today: 3 hours, opti 160 dialyzer, 2K bath, 3.5L UF  Renally dose antibiotics  Sevelamer with meals  Renal diet  Fluid restriction

## 2025-06-11 NOTE — PROGRESS NOTE ADULT - SUBJECTIVE AND OBJECTIVE BOX
24H events:    Patient is a 65y old Male who presents with a chief complaint of Volume Overload (06 Jun 2025 12:47)    Primary diagnosis of Pressure injury of skin of foot    Today is hospital day 9d. This morning patient was seen and examined at bedside, resting comfortably in bed.    No acute or major events overnight.    The patient reports feeling well. Denies new issues. Today is day 4 of no BM.    Code Status:  Full    PAST MEDICAL & SURGICAL HISTORY  S/P CABG (coronary artery bypass graft)  x4    Diabetes mellitus    Transient ischemic attack (TIA)  2017; 2008    Chronic kidney disease (CKD)  Stage IV    Hypertension    Stented coronary artery  in 2008    Myocardial infarction  2012    PAD (peripheral artery disease)  S/p bypass left leg    HLD (hyperlipidemia)    BPH (benign prostatic hyperplasia)    Pain in left knee  s/p fall    OA (osteoarthritis)    [place] (hard of hearing)    Chronic anemia    History of medical problems  left arm precaution    S/P CABG (coronary artery bypass graft)  2012    H/O arterial bypass of lower limb  Left Lower Extremity (2016)    History of surgery  Left CEA (2017)  Left Pinkie toe Amputation (2014)  CABG x 4 (2012)  Card cath - stent (2008)      AV fistula  2019  LEFT AV FISTULA      SOCIAL HISTORY:  Social History:      ALLERGIES:  No Known Allergies    MEDICATIONS:  STANDING MEDICATIONS  aspirin  chewable 81 milliGRAM(s) Oral daily  atorvastatin 40 milliGRAM(s) Oral at bedtime  bacitracin   Ointment 1 Application(s) Topical daily  chlorhexidine 2% Cloths 1 Application(s) Topical daily  clopidogrel Tablet 75 milliGRAM(s) Oral daily  cyanocobalamin 1000 MICROGram(s) Oral daily  dextrose 5%. 1000 milliLiter(s) IV Continuous <Continuous>  dextrose 5%. 1000 milliLiter(s) IV Continuous <Continuous>  dextrose 50% Injectable 25 Gram(s) IV Push once  dextrose 50% Injectable 12.5 Gram(s) IV Push once  dextrose 50% Injectable 25 Gram(s) IV Push once  fluticasone propionate/ salmeterol 250-50 MICROgram(s) Diskus 1 Dose(s) Inhalation two times a day  folic acid 1 milliGRAM(s) Oral daily  glucagon  Injectable 1 milliGRAM(s) IntraMuscular once  heparin   Injectable 5000 Unit(s) SubCutaneous every 12 hours  insulin glargine Injectable (LANTUS) 4 Unit(s) SubCutaneous at bedtime  lactulose Syrup 20 Gram(s) Oral every 2 hours  metoclopramide 5 milliGRAM(s) Oral two times a day  metoprolol tartrate 50 milliGRAM(s) Oral two times a day  NIFEdipine XL 30 milliGRAM(s) Oral daily  pantoprazole    Tablet 40 milliGRAM(s) Oral before breakfast  polyethylene glycol 3350 17 Gram(s) Oral every 12 hours  senna 2 Tablet(s) Oral at bedtime  sevelamer carbonate 1600 milliGRAM(s) Oral three times a day with meals    PRN MEDICATIONS  acetaminophen     Tablet .. 650 milliGRAM(s) Oral every 6 hours PRN  aluminum hydroxide/magnesium hydroxide/simethicone Suspension 30 milliLiter(s) Oral every 4 hours PRN  dextrose Oral Gel 15 Gram(s) Oral once PRN  melatonin 3 milliGRAM(s) Oral at bedtime PRN  ondansetron Injectable 4 milliGRAM(s) IV Push every 8 hours PRN    VITALS:   T(F): 98  HR: 74  BP: 137/74  RR: 18  SpO2: 96%    PHYSICAL EXAM:  GENERAL:   ( X ) NAD, sitting in chair comfortably     (  ) obtunded     (  ) lethargic     (  ) somnolent    HEART:  Rate -->     ( X ) normal rate     (  ) bradycardic     (  ) tachycardic  Rhythm -->     ( X ) regular     (  ) regularly irregular     (  ) irregularly irregular  Murmurs -->     ( X ) normal s1s2     (  ) systolic murmur     (  ) diastolic murmur     (  ) continuous murmur      (  ) S3 present     (  ) S4 present    LUNGS:   ( X ) Unlabored respirations     (  ) tachypnea  ( X ) B/L air entry     (  ) decreased breath sounds in:  (location     )    ( X ) no adventitious sound     (  ) crackles     (  ) wheezing      (  ) rhonchi      (specify location:       )  (  ) chest wall tenderness (specify location:       )    ABDOMEN:   ( X ) Soft     (  ) tense   |   ( X ) nondistended     (  ) distended   |   (  ) +BS     (  ) hypoactive bowel sounds     (  ) hyperactive bowel sounds  ( X ) nontender     (  ) RUQ tenderness     (  ) RLQ tenderness     (  ) LLQ tenderness     (  ) epigastric tenderness     (  ) diffuse tenderness  (  ) Splenomegaly      (  ) Hepatomegaly      (  ) Jaundice     (  ) ecchymosis     EXTREMITIES:    No edema  Noted b/l heels with pressure-offloading dressing. Non tender.    NERVOUS SYSTEM:    ( X ) A&Ox3     (  ) confused     (  ) lethargic  CN II-XII:     ( X ) Intact     (  ) deficits found     (Specify:     )   Upper extremities:     ( X ) no sensorimotor deficits     (  ) weakness     (  ) loss of proprioception/vibration     (  ) loss of touch/temperature (specify:    )  Lower extremities:     ( X ) no sensorimotor deficits     (  ) weakness     (  ) loss of proprioception/vibration     (  ) loss of touch/temperature (specify:    )      LABS:    06-10    137  |  96[L]  |  67[HH]  ----------------------------<  122[H]  4.6   |  27  |  5.5[HH]    Ca    8.8      10 Rakan 2025 11:33

## 2025-06-12 PROCEDURE — 99232 SBSQ HOSP IP/OBS MODERATE 35: CPT

## 2025-06-12 RX ORDER — TAMSULOSIN HYDROCHLORIDE 0.4 MG/1
1 CAPSULE ORAL
Refills: 0 | DISCHARGE

## 2025-06-12 RX ORDER — TAMSULOSIN HYDROCHLORIDE 0.4 MG/1
0.4 CAPSULE ORAL AT BEDTIME
Refills: 0 | Status: DISCONTINUED | OUTPATIENT
Start: 2025-06-12 | End: 2025-06-13

## 2025-06-12 RX ORDER — METOPROLOL SUCCINATE 50 MG/1
1 TABLET, EXTENDED RELEASE ORAL
Refills: 0 | DISCHARGE

## 2025-06-12 RX ORDER — METOPROLOL SUCCINATE 50 MG/1
50 TABLET, EXTENDED RELEASE ORAL DAILY
Refills: 0 | Status: DISCONTINUED | OUTPATIENT
Start: 2025-06-13 | End: 2025-06-13

## 2025-06-12 RX ORDER — ISOPROPYL ALCOHOL, BENZOCAINE .7; .06 ML/ML; ML/ML
0 SWAB TOPICAL
Qty: 100 | Refills: 1
Start: 2025-06-12

## 2025-06-12 RX ORDER — FUROSEMIDE 10 MG/ML
1 INJECTION INTRAMUSCULAR; INTRAVENOUS
Refills: 0 | DISCHARGE

## 2025-06-12 RX ORDER — TIZANIDINE 4 MG/1
1 TABLET ORAL
Refills: 0 | DISCHARGE

## 2025-06-12 RX ORDER — TRAZODONE HCL 100 MG
50 TABLET ORAL AT BEDTIME
Refills: 0 | Status: DISCONTINUED | OUTPATIENT
Start: 2025-06-12 | End: 2025-06-13

## 2025-06-12 RX ORDER — ATORVASTATIN CALCIUM 80 MG/1
1 TABLET, FILM COATED ORAL
Refills: 0 | DISCHARGE

## 2025-06-12 RX ORDER — METOPROLOL SUCCINATE 50 MG/1
50 TABLET, EXTENDED RELEASE ORAL ONCE
Refills: 0 | Status: COMPLETED | OUTPATIENT
Start: 2025-06-12 | End: 2025-06-12

## 2025-06-12 RX ORDER — TRAZODONE HCL 100 MG
1 TABLET ORAL
Refills: 0 | DISCHARGE

## 2025-06-12 RX ORDER — METOPROLOL SUCCINATE 50 MG/1
1 TABLET, EXTENDED RELEASE ORAL
Qty: 30 | Refills: 1
Start: 2025-06-12 | End: 2025-08-10

## 2025-06-12 RX ORDER — FLUTICASONE PROPIONATE 50 UG/1
1 SPRAY, METERED NASAL
Refills: 0 | DISCHARGE

## 2025-06-12 RX ORDER — POLYETHYLENE GLYCOL 3350 17 G/17G
17 POWDER, FOR SOLUTION ORAL
Qty: 1020 | Refills: 0
Start: 2025-06-12 | End: 2025-07-11

## 2025-06-12 RX ADMIN — Medication 5 MILLIGRAM(S): at 05:51

## 2025-06-12 RX ADMIN — HEPARIN SODIUM 5000 UNIT(S): 1000 INJECTION INTRAVENOUS; SUBCUTANEOUS at 05:49

## 2025-06-12 RX ADMIN — ATORVASTATIN CALCIUM 40 MILLIGRAM(S): 80 TABLET, FILM COATED ORAL at 21:29

## 2025-06-12 RX ADMIN — METOPROLOL SUCCINATE 50 MILLIGRAM(S): 50 TABLET, EXTENDED RELEASE ORAL at 17:42

## 2025-06-12 RX ADMIN — Medication 1 DOSE(S): at 08:53

## 2025-06-12 RX ADMIN — FOLIC ACID 1 MILLIGRAM(S): 1 TABLET ORAL at 12:03

## 2025-06-12 RX ADMIN — SEVELAMER HYDROCHLORIDE 1600 MILLIGRAM(S): 800 TABLET ORAL at 12:03

## 2025-06-12 RX ADMIN — HEPARIN SODIUM 5000 UNIT(S): 1000 INJECTION INTRAVENOUS; SUBCUTANEOUS at 17:42

## 2025-06-12 RX ADMIN — Medication 40 MILLIGRAM(S): at 05:49

## 2025-06-12 RX ADMIN — Medication 81 MILLIGRAM(S): at 12:02

## 2025-06-12 RX ADMIN — Medication 5 MILLIGRAM(S): at 17:42

## 2025-06-12 RX ADMIN — POLYETHYLENE GLYCOL 3350 17 GRAM(S): 17 POWDER, FOR SOLUTION ORAL at 05:49

## 2025-06-12 RX ADMIN — Medication 30 MILLIGRAM(S): at 05:49

## 2025-06-12 RX ADMIN — Medication 2 TABLET(S): at 21:29

## 2025-06-12 RX ADMIN — SEVELAMER HYDROCHLORIDE 1600 MILLIGRAM(S): 800 TABLET ORAL at 08:33

## 2025-06-12 RX ADMIN — SEVELAMER HYDROCHLORIDE 1600 MILLIGRAM(S): 800 TABLET ORAL at 17:42

## 2025-06-12 RX ADMIN — Medication 1 APPLICATION(S): at 12:02

## 2025-06-12 RX ADMIN — CYANOCOBALAMIN 1000 MICROGRAM(S): 1000 INJECTION INTRAMUSCULAR; SUBCUTANEOUS at 12:02

## 2025-06-12 RX ADMIN — CLOPIDOGREL BISULFATE 75 MILLIGRAM(S): 75 TABLET, FILM COATED ORAL at 12:03

## 2025-06-12 RX ADMIN — METOPROLOL SUCCINATE 50 MILLIGRAM(S): 50 TABLET, EXTENDED RELEASE ORAL at 05:49

## 2025-06-12 RX ADMIN — INSULIN GLARGINE-YFGN 4 UNIT(S): 100 INJECTION, SOLUTION SUBCUTANEOUS at 21:29

## 2025-06-12 RX ADMIN — Medication 1 DOSE(S): at 20:30

## 2025-06-12 RX ADMIN — TAMSULOSIN HYDROCHLORIDE 0.4 MILLIGRAM(S): 0.4 CAPSULE ORAL at 21:29

## 2025-06-12 RX ADMIN — Medication 1 APPLICATION(S): at 12:06

## 2025-06-12 NOTE — PROGRESS NOTE ADULT - ASSESSMENT
#T2DM with hypoglycemia  - Fingersticks are occasionally on lower end, patient noted to be eating.  - Insulin being titrated down, now on 4U Lantus --> continue and monitor.    #ESRD on HD  #Volume overload: improving, likely due to above  - Improved with HD here  - Minimal urine output - stopped Lasix.  - Started on sevelamer 1600 TID per nephro.    #Left Superficial Heel Ulcer  #Diabetic foot ulcer with Charcot Deformity  #PAD  -Foot x ray showed Stable severe Charcot arthropathy with loss of normal plantar arch. Severe midfoot and forefoot degenerative changes  -Arterial duplex showed The right posterior tibial artery is occluded. Moderate stenosis of the right dorsalis pedis artery. Left posterior tibial and peroneal arteries are occluded.  -Seen by podiatry and Infectious disease, wound care, no need for antibiotics, WBAT.   -Follow up with Vascular outpatient (has an appointment on Friday 6/13 at 10 AM with Dr. Malik, vascular surgery [address] [place] [address], 3rd floor).    #Ambulatory dysfunction  - Patient reports that previously patient was brooke to ambulate with walker and intermittently used wheelchair.  - PT eval 6/9 noted that he was max assist and unable to ambulate (or even stand safely).  - Likely deconditioning versus advancing neuropathy.  - PT re-eval on 6/11 patient was able to take a few steps --> PT now recommends [facility] w/ home PT.  - Outpatient neurology for further eval.    #Constipation  - Patient reported 4 days of no BM on 6/11.  - On Senna, added Miralax BID on 6/10, s/p Dulcolax 5mg x 1 and PO lactulose 20g x 3 on 6/11 --> 2 BMs on 6/11.    #CAD sp CABG  -Continue ASA, Plavix Lipitor.     #DIET: DASH/TLC, renal  #DVTppx: SQH  #GIppx: Pantoprazole  #Activity: Increase as tolerated; patient states that at baseline he uses a walker and a wheelchair.  #CODE: FULL  #Disposition: [facility]

## 2025-06-12 NOTE — PROGRESS NOTE ADULT - ASSESSMENT
ESRD on HD MWF at [facility]  L heel ulcer  DM  BPH  CAD status post CABG  Hypertension  TIA  Peripheral arterial disease    Plan:    HD tomorrow: 3 hours, opti 160 dialyzer, 2K bath, 3L UF  Renally dose antibiotics  Sevelamer with meals  Renal diet  Fluid restriction

## 2025-06-12 NOTE — PROGRESS NOTE ADULT - SUBJECTIVE AND OBJECTIVE BOX
24H events:    Patient is a 65y old Male who presents with a chief complaint of Volume Overload (06 Jun 2025 12:47)    Primary diagnosis of Pressure injury of skin of foot    Today is hospital day 10d. This morning patient was seen and examined at bedside, resting comfortably in bed.    No acute or major events overnight.    The patient had 2 BMs on 6/11. Does not have any new issues.    Code Status:  Full    PAST MEDICAL & SURGICAL HISTORY  S/P CABG (coronary artery bypass graft)  x4    Diabetes mellitus    Transient ischemic attack (TIA)  2017; 2008    Chronic kidney disease (CKD)  Stage IV    Hypertension    Stented coronary artery  in 2008    Myocardial infarction  2012    PAD (peripheral artery disease)  S/p bypass left leg    HLD (hyperlipidemia)    BPH (benign prostatic hyperplasia)    Pain in left knee  s/p fall    OA (osteoarthritis)    [place] (hard of hearing)    Chronic anemia    History of medical problems  left arm precaution    S/P CABG (coronary artery bypass graft)  2012    H/O arterial bypass of lower limb  Left Lower Extremity (2016)    History of surgery  Left CEA (2017)  Left Pinkie toe Amputation (2014)  CABG x 4 (2012)  Card cath - stent (2008)      AV fistula  2019  LEFT AV FISTULA      SOCIAL HISTORY:  Social History:      ALLERGIES:  No Known Allergies    MEDICATIONS:  STANDING MEDICATIONS  aspirin  chewable 81 milliGRAM(s) Oral daily  atorvastatin 40 milliGRAM(s) Oral at bedtime  bacitracin   Ointment 1 Application(s) Topical daily  chlorhexidine 2% Cloths 1 Application(s) Topical daily  clopidogrel Tablet 75 milliGRAM(s) Oral daily  cyanocobalamin 1000 MICROGram(s) Oral daily  dextrose 5%. 1000 milliLiter(s) IV Continuous <Continuous>  dextrose 5%. 1000 milliLiter(s) IV Continuous <Continuous>  dextrose 50% Injectable 25 Gram(s) IV Push once  dextrose 50% Injectable 12.5 Gram(s) IV Push once  dextrose 50% Injectable 25 Gram(s) IV Push once  fluticasone propionate/ salmeterol 250-50 MICROgram(s) Diskus 1 Dose(s) Inhalation two times a day  folic acid 1 milliGRAM(s) Oral daily  glucagon  Injectable 1 milliGRAM(s) IntraMuscular once  heparin   Injectable 5000 Unit(s) SubCutaneous every 12 hours  insulin glargine Injectable (LANTUS) 4 Unit(s) SubCutaneous at bedtime  metoclopramide 5 milliGRAM(s) Oral two times a day  metoprolol tartrate 50 milliGRAM(s) Oral two times a day  NIFEdipine XL 30 milliGRAM(s) Oral daily  pantoprazole    Tablet 40 milliGRAM(s) Oral before breakfast  polyethylene glycol 3350 17 Gram(s) Oral every 12 hours  senna 2 Tablet(s) Oral at bedtime  sevelamer carbonate 1600 milliGRAM(s) Oral three times a day with meals    PRN MEDICATIONS  acetaminophen     Tablet .. 650 milliGRAM(s) Oral every 6 hours PRN  aluminum hydroxide/magnesium hydroxide/simethicone Suspension 30 milliLiter(s) Oral every 4 hours PRN  dextrose Oral Gel 15 Gram(s) Oral once PRN  melatonin 3 milliGRAM(s) Oral at bedtime PRN  ondansetron Injectable 4 milliGRAM(s) IV Push every 8 hours PRN    VITALS:   T(F): 97.6  HR: 66  BP: 166/74  RR: 18  SpO2: 97%    PHYSICAL EXAM:  GENERAL:   ( X ) NAD, sitting in chair comfortably     (  ) obtunded     (  ) lethargic     (  ) somnolent    HEART:  Rate -->     ( X ) normal rate     (  ) bradycardic     (  ) tachycardic  Rhythm -->     ( X ) regular     (  ) regularly irregular     (  ) irregularly irregular  Murmurs -->     ( X ) normal s1s2     (  ) systolic murmur     (  ) diastolic murmur     (  ) continuous murmur      (  ) S3 present     (  ) S4 present    LUNGS:   ( X ) Unlabored respirations     (  ) tachypnea  ( X ) B/L air entry     (  ) decreased breath sounds in:  (location     )    ( X ) no adventitious sound     (  ) crackles     (  ) wheezing      (  ) rhonchi      (specify location:       )  (  ) chest wall tenderness (specify location:       )    ABDOMEN:   ( X ) Soft     (  ) tense   |   ( X ) nondistended     (  ) distended   |   (  ) +BS     (  ) hypoactive bowel sounds     (  ) hyperactive bowel sounds  ( X ) nontender     (  ) RUQ tenderness     (  ) RLQ tenderness     (  ) LLQ tenderness     (  ) epigastric tenderness     (  ) diffuse tenderness  (  ) Splenomegaly      (  ) Hepatomegaly      (  ) Jaundice     (  ) ecchymosis     EXTREMITIES:    No edema  Noted b/l heels with pressure-offloading dressing. Non tender.    NERVOUS SYSTEM:    ( X ) A&Ox3     (  ) confused     (  ) lethargic  CN II-XII:     ( X ) Intact     (  ) deficits found     (Specify:     )   Upper extremities:     ( X ) no sensorimotor deficits     (  ) weakness     (  ) loss of proprioception/vibration     (  ) loss of touch/temperature (specify:    )  Lower extremities:     ( X ) no sensorimotor deficits     (  ) weakness     (  ) loss of proprioception/vibration     (  ) loss of touch/temperature (specify:    )

## 2025-06-12 NOTE — PROGRESS NOTE ADULT - ATTENDING COMMENTS
I saw and examined the patient. I discussed the case with the resident, reviewed and edited the resident's note and agree with the findings and plan as documented.
Agree with above note  Dbx of calluses   Cont wound care   Follow up as o/p
PT assessment for possible STR d/c. BS under control with insulin 4U. Rest per resident's note    Time-based billing (NON-critical care).     51 minutes spent on total encounter. The necessity of the time spent during the encounter on this date of service was due to:     time spent on review of labs, imaging studies, old records, obtaining history, personally examining patient, counselling and communicating with patient/ family, entering orders for medications/tests/etc, discussions with other health care providers, documentation in electronic health records, independent interpretation of labs, imaging/procedure results and care coordination
I saw and examined the patient. I discussed the case with the resident, reviewed and edited the resident's note and agree with the findings and plan as documented.
65-year-old male with a past medical history of end-stage renal disease on hemodialysis Monday Wednesday Friday via left arm AV fistula, diabetes, BPH, CAD status post CABG, hypertension, CHF, TIA, peripheral arterial disease, history of left toe ulcers status post amputations presents to the ED for evaluation abrasion to the left heel which she sustained about an hour ago which has now stopped bleeding.  Patient is on aspirin and Plavix.  Patient denies fever, chills, weakness, or recent trauma.    L Pressure Superficial Heel Ulcer  AHRF sec to overload /effusions   ESRD- MWF  CAD sp CABG  PAD  DM2  Basilar opacities/effusions, left greater than right    Handoff: Podiatry recs appreciated follow up outpatient ,hold off anbx per ID ,vascular follow up outpatient ,patient remains on NC wean off as tolerated ,cont lasix daily ,for HD tomorrow ,few runs of Vtach today ,monitor on tele ,monitor and replete lytes as needed.

## 2025-06-12 NOTE — PROGRESS NOTE ADULT - SUBJECTIVE AND OBJECTIVE BOX
[place] [facility] FOLLOW UP NOTE  --------------------------------------------------------------------------------  24 hour events/subjective: Patient examined. Appears comfortable.    PAST HISTORY  --------------------------------------------------------------------------------  No significant changes to PMH, PSH, FHx, SHx, unless otherwise noted    ALLERGIES & MEDICATIONS  --------------------------------------------------------------------------------  Allergies    No Known Allergies    Standing Inpatient Medications  aspirin  chewable 81 milliGRAM(s) Oral daily  atorvastatin 40 milliGRAM(s) Oral at bedtime  bacitracin   Ointment 1 Application(s) Topical daily  chlorhexidine 2% Cloths 1 Application(s) Topical daily  clopidogrel Tablet 75 milliGRAM(s) Oral daily  cyanocobalamin 1000 MICROGram(s) Oral daily  dextrose 5%. 1000 milliLiter(s) IV Continuous <Continuous>  dextrose 5%. 1000 milliLiter(s) IV Continuous <Continuous>  dextrose 50% Injectable 25 Gram(s) IV Push once  dextrose 50% Injectable 12.5 Gram(s) IV Push once  dextrose 50% Injectable 25 Gram(s) IV Push once  fluticasone propionate/ salmeterol 250-50 MICROgram(s) Diskus 1 Dose(s) Inhalation two times a day  folic acid 1 milliGRAM(s) Oral daily  glucagon  Injectable 1 milliGRAM(s) IntraMuscular once  heparin   Injectable 5000 Unit(s) SubCutaneous every 12 hours  insulin glargine Injectable (LANTUS) 4 Unit(s) SubCutaneous at bedtime  metoclopramide 5 milliGRAM(s) Oral two times a day  metoprolol tartrate 50 milliGRAM(s) Oral two times a day  NIFEdipine XL 30 milliGRAM(s) Oral daily  pantoprazole    Tablet 40 milliGRAM(s) Oral before breakfast  polyethylene glycol 3350 17 Gram(s) Oral every 12 hours  senna 2 Tablet(s) Oral at bedtime  sevelamer carbonate 1600 milliGRAM(s) Oral three times a day with meals    PRN Inpatient Medications  acetaminophen     Tablet .. 650 milliGRAM(s) Oral every 6 hours PRN  aluminum hydroxide/magnesium hydroxide/simethicone Suspension 30 milliLiter(s) Oral every 4 hours PRN  dextrose Oral Gel 15 Gram(s) Oral once PRN  melatonin 3 milliGRAM(s) Oral at bedtime PRN  ondansetron Injectable 4 milliGRAM(s) IV Push every 8 hours PRN    VITALS/PHYSICAL EXAM  --------------------------------------------------------------------------------  T(C): 36.5 (06-12-25 @ 04:57), Max: 36.7 (06-11-25 @ 14:31)  HR: 76 (06-12-25 @ 04:57) (74 - 81)  BP: 158/71 (06-12-25 @ 04:57) (136/68 - 158/71)  RR: 18 (06-12-25 @ 04:57) (18 - 18)  SpO2: 97% (06-12-25 @ 04:57) (96% - 97%)    06-11-25 @ 07:01  -  06-12-25 @ 07:00  --------------------------------------------------------  IN: 0 mL / OUT: 2000 mL / NET: -2000 mL    Physical Exam:  	Gen: NAD  	Pulm: CTA B/L  	CV: RRR, S1S2  	Abd: +BS, soft, nontender/nondistended  	: No suprapubic tenderness  	LE: Warm, no edema  	Vascular access: AVF    LABS/STUDIES  --------------------------------------------------------------------------------  Creatinine Trend:  SCr 5.5 [06-10 @ 11:33]  SCr 5.9 [06-09 @ 07:43]  SCr 5.1 [06-08 @ 07:23]  SCr 3.8 [06-07 @ 07:32]  SCr 4.1 [06-06 @ 06:56]    Urinalysis - [06-10-25 @ 11:33]      Color  / Appearance  / SG  / pH       Gluc 122 / Ketone   / Bili  / Urobili        Blood  / Protein  / Leuk Est  / Nitrite       RBC  / WBC  / Hyaline  / Gran  / Sq Epi  / Non Sq Epi  / Bacteria     Iron 93, TIBC --, %sat --      [10-01-24 @ 07:00]  Ferritin 1129      [10-01-24 @ 07:00]  PTH -- (Ca 7.8)      [09-24-24 @ 06:56]   135  Vitamin D (25OH) 15      [09-25-24 @ 06:49]  Lipid: chol 81, TG 52, HDL 43, LDL --      [06-02-25 @ 06:24]    HBsAb 506.7      [06-10-25 @ 11:33]  HBsAb Reactive      [06-10-25 @ 11:33]  HBsAg Nonreact      [06-10-25 @ 11:33]  HCV 0.16, Nonreact      [06-10-25 @ 11:33]

## 2025-06-12 NOTE — PROGRESS NOTE ADULT - TIME BILLING
Evaluate and treat the patient's acute illness and chronic medical conditions as well as time spent reviewing prior records, labs, radiology, documenting in electronic medical records, discussing plan with medical team, consultants and case management with >50% of time spent face to face with patient, discussing with patient/family as well as coordination of care.

## 2025-06-13 ENCOUNTER — APPOINTMENT (OUTPATIENT)
Dept: VASCULAR SURGERY | Facility: CLINIC | Age: 65
End: 2025-06-13

## 2025-06-13 ENCOUNTER — TRANSCRIPTION ENCOUNTER (OUTPATIENT)
Age: 65
End: 2025-06-13

## 2025-06-13 VITALS
OXYGEN SATURATION: 100 % | RESPIRATION RATE: 18 BRPM | DIASTOLIC BLOOD PRESSURE: 55 MMHG | SYSTOLIC BLOOD PRESSURE: 108 MMHG | HEART RATE: 76 BPM

## 2025-06-13 PROCEDURE — 99239 HOSP IP/OBS DSCHRG MGMT >30: CPT

## 2025-06-13 RX ADMIN — Medication 1 APPLICATION(S): at 12:17

## 2025-06-13 RX ADMIN — METOPROLOL SUCCINATE 50 MILLIGRAM(S): 50 TABLET, EXTENDED RELEASE ORAL at 05:36

## 2025-06-13 RX ADMIN — Medication 1 APPLICATION(S): at 12:20

## 2025-06-13 RX ADMIN — HEPARIN SODIUM 5000 UNIT(S): 1000 INJECTION INTRAVENOUS; SUBCUTANEOUS at 17:37

## 2025-06-13 RX ADMIN — CLOPIDOGREL BISULFATE 75 MILLIGRAM(S): 75 TABLET, FILM COATED ORAL at 12:17

## 2025-06-13 RX ADMIN — CYANOCOBALAMIN 1000 MICROGRAM(S): 1000 INJECTION INTRAMUSCULAR; SUBCUTANEOUS at 12:17

## 2025-06-13 RX ADMIN — SEVELAMER HYDROCHLORIDE 1600 MILLIGRAM(S): 800 TABLET ORAL at 08:03

## 2025-06-13 RX ADMIN — Medication 5 MILLIGRAM(S): at 05:36

## 2025-06-13 RX ADMIN — Medication 650 MILLIGRAM(S): at 09:26

## 2025-06-13 RX ADMIN — Medication 650 MILLIGRAM(S): at 12:20

## 2025-06-13 RX ADMIN — SEVELAMER HYDROCHLORIDE 1600 MILLIGRAM(S): 800 TABLET ORAL at 17:37

## 2025-06-13 RX ADMIN — Medication 40 MILLIGRAM(S): at 06:09

## 2025-06-13 RX ADMIN — FOLIC ACID 1 MILLIGRAM(S): 1 TABLET ORAL at 12:17

## 2025-06-13 RX ADMIN — POLYETHYLENE GLYCOL 3350 17 GRAM(S): 17 POWDER, FOR SOLUTION ORAL at 05:36

## 2025-06-13 RX ADMIN — Medication 81 MILLIGRAM(S): at 12:17

## 2025-06-13 RX ADMIN — Medication 5 MILLIGRAM(S): at 17:37

## 2025-06-13 RX ADMIN — SEVELAMER HYDROCHLORIDE 1600 MILLIGRAM(S): 800 TABLET ORAL at 12:17

## 2025-06-13 RX ADMIN — HEPARIN SODIUM 5000 UNIT(S): 1000 INJECTION INTRAVENOUS; SUBCUTANEOUS at 05:36

## 2025-06-13 RX ADMIN — Medication 30 MILLIGRAM(S): at 05:38

## 2025-06-13 RX ADMIN — Medication 1 DOSE(S): at 08:06

## 2025-06-13 NOTE — DISCHARGE NOTE NURSING/CASE MANAGEMENT/SOCIAL WORK - NSDCVIVACCINE_GEN_ALL_CORE_FT
Tdap; 26-Apr-2025 19:24; Elizabeth Perez (RN); [employer]; [id number] (Exp. Date: 01-Jan-2027); IntraMuscular; Deltoid Right.; 0.5 milliLiter(s); VIS (VIS Published: 09-May-2013, VIS Presented: 26-Apr-2025);

## 2025-06-13 NOTE — DISCHARGE NOTE NURSING/CASE MANAGEMENT/SOCIAL WORK - NSDCPEFALRISK_GEN_ALL_CORE
For information on Fall & Injury Prevention, visit: [email or web address] OR  [email or web address] OR  https://www.cdc.gov/steadi/patient.html

## 2025-06-13 NOTE — PROGRESS NOTE ADULT - ASSESSMENT
ESRD on HD MWF at [facility]  L heel ulcer  DM  BPH  CAD status post CABG  Hypertension  TIA  Peripheral arterial disease    Plan:    HD today: 3 hours, opti 160 dialyzer, 2K bath, 2L UF  Renally dose antibiotics  Sevelamer with meals  Renal diet  Fluid restriction

## 2025-06-13 NOTE — PROGRESS NOTE ADULT - SUBJECTIVE AND OBJECTIVE BOX
[place] [facility] FOLLOW UP NOTE  --------------------------------------------------------------------------------  24 hour events/subjective: Patient examined during HD. Appears comfortable.    PAST HISTORY  --------------------------------------------------------------------------------  No significant changes to PMH, PSH, FHx, SHx, unless otherwise noted    ALLERGIES & MEDICATIONS  --------------------------------------------------------------------------------  Allergies    No Known Allergies    Standing Inpatient Medications  aspirin  chewable 81 milliGRAM(s) Oral daily  atorvastatin 40 milliGRAM(s) Oral at bedtime  bacitracin   Ointment 1 Application(s) Topical daily  chlorhexidine 2% Cloths 1 Application(s) Topical daily  clopidogrel Tablet 75 milliGRAM(s) Oral daily  cyanocobalamin 1000 MICROGram(s) Oral daily  dextrose 5%. 1000 milliLiter(s) IV Continuous <Continuous>  dextrose 5%. 1000 milliLiter(s) IV Continuous <Continuous>  dextrose 50% Injectable 25 Gram(s) IV Push once  dextrose 50% Injectable 12.5 Gram(s) IV Push once  dextrose 50% Injectable 25 Gram(s) IV Push once  fluticasone propionate/ salmeterol 250-50 MICROgram(s) Diskus 1 Dose(s) Inhalation two times a day  folic acid 1 milliGRAM(s) Oral daily  glucagon  Injectable 1 milliGRAM(s) IntraMuscular once  heparin   Injectable 5000 Unit(s) SubCutaneous every 12 hours  insulin glargine Injectable (LANTUS) 4 Unit(s) SubCutaneous at bedtime  metoclopramide 5 milliGRAM(s) Oral two times a day  metoprolol succinate ER 50 milliGRAM(s) Oral daily  NIFEdipine XL 30 milliGRAM(s) Oral daily  pantoprazole    Tablet 40 milliGRAM(s) Oral before breakfast  polyethylene glycol 3350 17 Gram(s) Oral every 12 hours  senna 2 Tablet(s) Oral at bedtime  sevelamer carbonate 1600 milliGRAM(s) Oral three times a day with meals  tamsulosin 0.4 milliGRAM(s) Oral at bedtime    PRN Inpatient Medications  acetaminophen     Tablet .. 650 milliGRAM(s) Oral every 6 hours PRN  aluminum hydroxide/magnesium hydroxide/simethicone Suspension 30 milliLiter(s) Oral every 4 hours PRN  dextrose Oral Gel 15 Gram(s) Oral once PRN  melatonin 3 milliGRAM(s) Oral at bedtime PRN  ondansetron Injectable 4 milliGRAM(s) IV Push every 8 hours PRN  traZODone 50 milliGRAM(s) Oral at bedtime PRN    VITALS/PHYSICAL EXAM  --------------------------------------------------------------------------------  T(C): 36.4 (06-13-25 @ 04:38), Max: 36.4 (06-12-25 @ 14:06)  HR: 76 (06-13-25 @ 11:15) (64 - 76)  BP: 108/55 (06-13-25 @ 11:15) (108/55 - 166/74)  RR: 18 (06-13-25 @ 11:15) (18 - 18)  SpO2: 100% (06-13-25 @ 11:15) (93% - 100%)    06-13-25 @ 07:01  -  06-13-25 @ 12:57  --------------------------------------------------------  IN: 0 mL / OUT: 1500 mL / NET: -1500 mL    Physical Exam:  	Gen: NAD  	Pulm: CTA B/L  	CV: RRR, S1S2  	Abd: +BS, soft, nontender/nondistended  	: No suprapubic tenderness  	LE: Warm,  no edema  	Vascular access: AVF    LABS/STUDIES  --------------------------------------------------------------------------------  Creatinine Trend:  SCr 5.5 [06-10 @ 11:33]  SCr 5.9 [06-09 @ 07:43]  SCr 5.1 [06-08 @ 07:23]  SCr 3.8 [06-07 @ 07:32]  SCr 4.1 [06-06 @ 06:56]    Urinalysis - [06-10-25 @ 11:33]      Color  / Appearance  / SG  / pH       Gluc 122 / Ketone   / Bili  / Urobili        Blood  / Protein  / Leuk Est  / Nitrite       RBC  / WBC  / Hyaline  / Gran  / Sq Epi  / Non Sq Epi  / Bacteria     Iron 93, TIBC --, %sat --      [10-01-24 @ 07:00]  Ferritin 1129      [10-01-24 @ 07:00]  PTH -- (Ca 7.8)      [09-24-24 @ 06:56]   135  Vitamin D (25OH) 15      [09-25-24 @ 06:49]  Lipid: chol 81, TG 52, HDL 43, LDL --      [06-02-25 @ 06:24]    HBsAb 506.7      [06-10-25 @ 11:33]  HBsAb Reactive      [06-10-25 @ 11:33]  HBsAg Nonreact      [06-10-25 @ 11:33]  HCV 0.16, Nonreact      [06-10-25 @ 11:33]

## 2025-06-13 NOTE — DISCHARGE NOTE NURSING/CASE MANAGEMENT/SOCIAL WORK - FINANCIAL ASSISTANCE
[facility] provides services at a reduced cost to those who are determined to be eligible through [facility]’s financial assistance program. Information regarding [facility]’s financial assistance program can be found by going to [email or web address] or by calling [phone].

## 2025-06-13 NOTE — PROGRESS NOTE ADULT - PROVIDER SPECIALTY LIST ADULT
Hospitalist
Internal Medicine
Nephrology
Podiatry
Hospitalist
Internal Medicine
Nephrology
Podiatry
Internal Medicine
Nephrology

## 2025-06-13 NOTE — DISCHARGE NOTE NURSING/CASE MANAGEMENT/SOCIAL WORK - PATIENT PORTAL LINK FT
You can access the FollowMyHealth Patient Portal offered by [facility] by registering at the following website: [email or web address]. By joining [email or web address] FollowMyHealth portal, you will also be able to view your health information using other applications (apps) compatible with our system.

## 2025-06-18 DIAGNOSIS — E11.22 TYPE 2 DIABETES MELLITUS WITH DIABETIC CHRONIC KIDNEY DISEASE: ICD-10-CM

## 2025-06-18 DIAGNOSIS — I13.2 HYPERTENSIVE HEART AND CHRONIC KIDNEY DISEASE WITH HEART FAILURE AND WITH STAGE 5 CHRONIC KIDNEY DISEASE, OR END STAGE RENAL DISEASE: ICD-10-CM

## 2025-06-18 DIAGNOSIS — E78.5 HYPERLIPIDEMIA, UNSPECIFIED: ICD-10-CM

## 2025-06-18 DIAGNOSIS — E11.621 TYPE 2 DIABETES MELLITUS WITH FOOT ULCER: ICD-10-CM

## 2025-06-18 DIAGNOSIS — Z79.02 LONG TERM (CURRENT) USE OF ANTITHROMBOTICS/ANTIPLATELETS: ICD-10-CM

## 2025-06-18 DIAGNOSIS — I25.10 ATHEROSCLEROTIC HEART DISEASE OF NATIVE CORONARY ARTERY WITHOUT ANGINA PECTORIS: ICD-10-CM

## 2025-06-18 DIAGNOSIS — I50.9 HEART FAILURE, UNSPECIFIED: ICD-10-CM

## 2025-06-18 DIAGNOSIS — N40.0 BENIGN PROSTATIC HYPERPLASIA WITHOUT LOWER URINARY TRACT SYMPTOMS: ICD-10-CM

## 2025-06-18 DIAGNOSIS — E11.610 TYPE 2 DIABETES MELLITUS WITH DIABETIC NEUROPATHIC ARTHROPATHY: ICD-10-CM

## 2025-06-18 DIAGNOSIS — L89.629 PRESSURE ULCER OF LEFT HEEL, UNSPECIFIED STAGE: ICD-10-CM

## 2025-06-18 DIAGNOSIS — Z79.82 LONG TERM (CURRENT) USE OF ASPIRIN: ICD-10-CM

## 2025-06-18 DIAGNOSIS — H91.93 UNSPECIFIED HEARING LOSS, BILATERAL: ICD-10-CM

## 2025-06-18 DIAGNOSIS — N18.6 END STAGE RENAL DISEASE: ICD-10-CM

## 2025-06-18 DIAGNOSIS — Z95.820 PERIPHERAL VASCULAR ANGIOPLASTY STATUS WITH IMPLANTS AND GRAFTS: ICD-10-CM

## 2025-06-18 DIAGNOSIS — Z95.5 PRESENCE OF CORONARY ANGIOPLASTY IMPLANT AND GRAFT: ICD-10-CM

## 2025-06-18 DIAGNOSIS — E11.51 TYPE 2 DIABETES MELLITUS WITH DIABETIC PERIPHERAL ANGIOPATHY WITHOUT GANGRENE: ICD-10-CM

## 2025-06-18 DIAGNOSIS — E11.649 TYPE 2 DIABETES MELLITUS WITH HYPOGLYCEMIA WITHOUT COMA: ICD-10-CM

## 2025-06-18 DIAGNOSIS — K59.00 CONSTIPATION, UNSPECIFIED: ICD-10-CM

## 2025-06-18 DIAGNOSIS — Z79.4 LONG TERM (CURRENT) USE OF INSULIN: ICD-10-CM

## 2025-06-18 DIAGNOSIS — Z95.1 PRESENCE OF AORTOCORONARY BYPASS GRAFT: ICD-10-CM

## 2025-06-18 DIAGNOSIS — Z99.2 DEPENDENCE ON RENAL DIALYSIS: ICD-10-CM

## 2025-06-18 DIAGNOSIS — L03.116 CELLULITIS OF LEFT LOWER LIMB: ICD-10-CM

## 2025-06-18 DIAGNOSIS — I25.2 OLD MYOCARDIAL INFARCTION: ICD-10-CM

## 2025-06-18 DIAGNOSIS — M19.91 PRIMARY OSTEOARTHRITIS, UNSPECIFIED SITE: ICD-10-CM

## 2025-06-18 DIAGNOSIS — J96.01 ACUTE RESPIRATORY FAILURE WITH HYPOXIA: ICD-10-CM

## 2025-06-18 DIAGNOSIS — Z86.73 PERSONAL HISTORY OF TRANSIENT ISCHEMIC ATTACK (TIA), AND CEREBRAL INFARCTION WITHOUT RESIDUAL DEFICITS: ICD-10-CM

## 2025-06-24 ENCOUNTER — TRANSCRIPTION ENCOUNTER (OUTPATIENT)
Age: 65
End: 2025-06-24

## 2025-06-30 ENCOUNTER — TRANSCRIPTION ENCOUNTER (OUTPATIENT)
Age: 65
End: 2025-06-30

## 2025-07-01 ENCOUNTER — OUTPATIENT (OUTPATIENT)
Dept: OUTPATIENT SERVICES | Facility: HOSPITAL | Age: 65
LOS: 1 days | End: 2025-07-01
Payer: MEDICARE

## 2025-07-01 ENCOUNTER — APPOINTMENT (OUTPATIENT)
Dept: SPEECH THERAPY | Facility: CLINIC | Age: 65
End: 2025-07-01

## 2025-07-01 DIAGNOSIS — I77.0 ARTERIOVENOUS FISTULA, ACQUIRED: Chronic | ICD-10-CM

## 2025-07-01 DIAGNOSIS — H90.3 SENSORINEURAL HEARING LOSS, BILATERAL: ICD-10-CM

## 2025-07-01 DIAGNOSIS — Z95.828 PRESENCE OF OTHER VASCULAR IMPLANTS AND GRAFTS: Chronic | ICD-10-CM

## 2025-07-01 DIAGNOSIS — Z95.1 PRESENCE OF AORTOCORONARY BYPASS GRAFT: Chronic | ICD-10-CM

## 2025-07-01 DIAGNOSIS — Z98.890 OTHER SPECIFIED POSTPROCEDURAL STATES: Chronic | ICD-10-CM

## 2025-07-01 PROCEDURE — 92592: CPT

## 2025-07-13 ENCOUNTER — EMERGENCY (EMERGENCY)
Facility: HOSPITAL | Age: 65
LOS: 0 days | Discharge: HOME | End: 2025-07-14
Attending: EMERGENCY MEDICINE
Payer: MEDICARE

## 2025-07-13 DIAGNOSIS — I25.10 ATHEROSCLEROTIC HEART DISEASE OF NATIVE CORONARY ARTERY WITHOUT ANGINA PECTORIS: ICD-10-CM

## 2025-07-13 DIAGNOSIS — I50.9 HEART FAILURE, UNSPECIFIED: ICD-10-CM
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
## 2025-07-13 DIAGNOSIS — Z98.890 OTHER SPECIFIED POSTPROCEDURAL STATES: Chronic | ICD-10-CM

## 2025-07-13 DIAGNOSIS — G62.9 POLYNEUROPATHY, UNSPECIFIED: ICD-10-CM
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
## 2025-07-13 DIAGNOSIS — Z95.1 PRESENCE OF AORTOCORONARY BYPASS GRAFT: Chronic | ICD-10-CM

## 2025-07-13 DIAGNOSIS — S92.401B: ICD-10-CM

## 2025-07-13 DIAGNOSIS — N18.6 END STAGE RENAL DISEASE: ICD-10-CM

## 2025-07-13 DIAGNOSIS — I77.0 ARTERIOVENOUS FISTULA, ACQUIRED: Chronic | ICD-10-CM

## 2025-07-13 DIAGNOSIS — Z95.828 PRESENCE OF OTHER VASCULAR IMPLANTS AND GRAFTS: Chronic | ICD-10-CM

## 2025-07-13 PROCEDURE — 99284 EMERGENCY DEPT VISIT MOD MDM: CPT

## 2025-07-13 PROCEDURE — 73620 X-RAY EXAM OF FOOT: CPT | Mod: RT

## 2025-07-13 PROCEDURE — 99283 EMERGENCY DEPT VISIT LOW MDM: CPT | Mod: 25

## 2025-07-14 VITALS
RESPIRATION RATE: 17 BRPM | OXYGEN SATURATION: 96 % | HEIGHT: 71 IN | DIASTOLIC BLOOD PRESSURE: 84 MMHG | SYSTOLIC BLOOD PRESSURE: 168 MMHG | TEMPERATURE: 97 F | HEART RATE: 70 BPM

## 2025-07-14 PROCEDURE — 73620 X-RAY EXAM OF FOOT: CPT | Mod: 26,RT

## 2025-07-14 NOTE — ED PROVIDER NOTE - NSFOLLOWUPINSTRUCTIONS_ED_ALL_ED_FT
Clotting Factor Deficiencies: Care Instructions Your Care Instructions Clotting factors are substances in the blood that help stop bleeding after a cut or injury. They also prevent sudden bleeding. In people who have clotting factor problems, the clotting factors don't work right or, in some cases, are missing. When blood does not clot well, even minor injuries can cause serious bleeding. This can lead to blood loss, injury to internal organs, or damage to muscles or joints. Several conditions, including hemophilia, can make it hard for the blood to clot. Your doctor can treat you by giving you replacement clotting factors. You also may take medicine to prevent bleeding. You may often have clotting factors transfused into a vein to prevent bleeding, or you may get them as needed. You may eventually learn to do this at home. You can also try to prevent injuries that can cause you to bleed. Follow-up care is a key part of your treatment and safety. Be sure to make and go to all appointments, and call your doctor if you are having problems. It's also a good idea to know your test results and keep a list of the medicines you take. How can you care for yourself at home? · Take your medicines exactly as prescribed. Call your doctor if you think you are having a problem with your medicine. You will get more details on the specific medicines your doctor prescribes. · Stay at a healthy body weight. If you are overweight, the additional stress on joints can trigger bleeding. · Exercise safely. Avoid contact sports. Swim or walk to avoid excess pressure on your joints. Check with your doctor before doing activities that put you at high risk for falls, such as riding a bike. · Brush and floss your teeth daily. This may help you avoid problems that could lead to having a tooth pulled.  
· Avoid aspirin and nonsteroidal anti-inflammatory drugs (NSAIDs), such as ibuprofen (Advil, Motrin) or naproxen (Aleve). They can increase the chance of bleeding. · Take pain medicines exactly as directed. ? If the doctor gave you a prescription medicine for pain, take it as prescribed. ? If you are not taking a prescription pain medicine, ask your doctor if you can take an over-the-counter medicine. · Take care to prevent accidents at home: ? Make sure rugs are tacked down so you do not slip. ? Keep furniture with sharp edges out of pathways. ? Use nonskid floor wax. ? Wipe up spills quickly. ? If you live in an area that gets snow and ice in the winter, sprinkle salt on steps and sidewalks. ? Avoid loose-fitting shoes. You might lose your balance and fall. · Wear medical alert jewelry that lists your clotting problem. You can buy this at most drugstores. When should you call for help? Call 911 anytime you think you may need emergency care. For example, call if: 
  · You passed out (lost consciousness).  
  · You have signs of severe bleeding, which includes: ? You have a severe headache that is different from past headaches. ? You vomit blood or what looks like coffee grounds. ? Your stools are maroon or very bloody.  
 Call your doctor now or seek immediate medical care if: 
  · You are dizzy or lightheaded, or you feel like you may faint.  
  · You have abnormal bleeding, such as: ? A nosebleed that you can't easily stop. ? Your stools are black and look like tar, or they have streaks of blood. ? You have blood in your urine. ? You have joint pain. ? You have bruises or blood spots under your skin.  
 Watch closely for changes in your health, and be sure to contact your doctor if: 
  · You do not get better as expected. Where can you learn more? Go to http://[facility]-ka[facility].info/. Enter O792 in the search box to learn more about \"Clotting Factor Deficiencies: Care Instructions. \" Current as of: May 6, 2018 Content Version: 11.9 © 4960-7012 [employer]. Care instructions adapted under license by [employer] (which disclaims liability or warranty for this information). If you have questions about a medical condition or this instruction, always ask your healthcare professional. [address] any warranty or liability for your use of this information. follow up with podiatry in 1-2 weeks  Hard sole shoe recommended to be worn  return for any pain, swelling or other concerning symptoms

## 2025-07-14 NOTE — ED CLERICAL - NS ED CARE COORDINATION INFORMATION
This patient is enrolled in the STARS readmission reduction initiative and has active care navigation. This patient can be followed up by the care navigation team within 24 hours.  To arrange close follow-up or to obtain additional clinical information, please call the contact number above.     The on call [facility] Hospitalist has been notified and will coordinate care in concert with the ED Physician including consults as necessary.Please reach out to the Hospitalist on-call directly (schedule on AMiON posted on the intranet). You may also call the Hospitalist Division at [phone] at either site. Consider CDU for management per guideline

## 2025-07-14 NOTE — ED ADULT NURSE NOTE - NSFALLRISKINTERV_ED_ALL_ED
Assistance OOB with selected safe patient handling equipment if applicable/Assistance with ambulation/Communicate fall risk and risk factors to all staff, patient, and family/Monitor gait and stability/Provide visual cue: yellow wristband, yellow gown, etc/Reinforce activity limits and safety measures with patient and family/Call bell, personal items and telephone in reach/Instruct patient to call for assistance before getting out of bed/chair/stretcher/Non-slip footwear applied when patient is off stretcher/[place] to call system/Physically safe environment - no spills, clutter or unnecessary equipment/Purposeful Proactive Rounding/Room/bathroom lighting operational, light cord in reach

## 2025-07-14 NOTE — ED ADULT NURSE NOTE - NSICDXPASTSURGICALHX_GEN_ALL_CORE_FT
PAST SURGICAL HISTORY:  AV fistula 2019  LEFT AV FISTULA    H/O arterial bypass of lower limb Left Lower Extremity (2016)    History of surgery Left CEA (2017)  Left Pinkie toe Amputation (2014)  CABG x 4 (2012)  Card cath - stent (2008)      S/P CABG (coronary artery bypass graft) 2012     complains of pain/discomfort

## 2025-07-14 NOTE — ED ADULT NURSE NOTE - CHIEF COMPLAINT QUOTE
Pt was sent from [facility] for an injury to the right hallux and 5th toe by hitting it against the wall. Pt also mentioned that he has to go to dialysis at 5 am.

## 2025-07-14 NOTE — ED ADULT NURSE NOTE - NSICDXPASTMEDICALHX_GEN_ALL_CORE_FT
PAST MEDICAL HISTORY:  BPH (benign prostatic hyperplasia)     Chronic anemia     Chronic kidney disease (CKD) Stage IV    Diabetes mellitus     History of medical problems left arm precaution    HLD (hyperlipidemia)     Eek (hard of hearing)     Hypertension     Myocardial infarction 2012    OA (osteoarthritis)     PAD (peripheral artery disease) S/p bypass left leg    Pain in left knee s/p fall    S/P CABG (coronary artery bypass graft) x4    Stented coronary artery in 2008    Transient ischemic attack (TIA) 2017; 2008

## 2025-07-14 NOTE — ED PROVIDER NOTE - PHYSICAL EXAMINATION
wd/wn  nad  alert  R foot: + skin tear 5th digit. no deformity, no ttp, no crepitus. + abrasion to 1st toe. no creptius, no ttp

## 2025-07-14 NOTE — ED PROVIDER NOTE - CLINICAL SUMMARY MEDICAL DECISION MAKING FREE TEXT BOX
64 yo man w/ R toe injury. no complaints. Last Td < 5 years  XR foot to r/o fx given neuropahty and unreliable exam 66 yo man w/ R toe injury. no complaints. Last Td < 5 years  XR foot to r/o fx given neuropahty and unreliable exam    of note, pt non-ambulatory at baseline  hard sole shoe placed

## 2025-07-14 NOTE — ED PROVIDER NOTE - WR INTERPRETED BY 1
Pt BIB EMS from the [facility]. Pt found with hematoma to back of head this morning.  Unknown if fell but may have hit head on bunk bed/shelf.  Pt at baseline per staff.  Pt minimally interactive.  
Jay Hatch

## 2025-07-14 NOTE — ED PROVIDER NOTE - PATIENT PORTAL LINK FT
You can access the FollowMyHealth Patient Portal offered by [facility] by registering at the following website: [email or web address]. By joining [employer]’s FollowMyHealth portal, you will also be able to view your health information using other applications (apps) compatible with our system.

## 2025-07-14 NOTE — ED PROVIDER NOTE - OBJECTIVE STATEMENT
66 yo man w/ CAD, CHF, ESRD, neuropathy from SNF for R toe injury  pt states kicked wall by mistake  has no pain and no complaints but, has hx of neuropathy in b/l feet  no other inj/complaints

## 2025-07-14 NOTE — ED ADULT TRIAGE NOTE - CHIEF COMPLAINT QUOTE
Pt was sent from [facility] for an injury to the right hallux and 5th toe by hitting it against the wall. Pt also mentioned that he has to go to dialysis at 5 am.

## 2025-07-15 ENCOUNTER — OUTPATIENT (OUTPATIENT)
Dept: OUTPATIENT SERVICES | Facility: HOSPITAL | Age: 65
LOS: 1 days | End: 2025-07-15
Payer: MEDICARE

## 2025-07-15 ENCOUNTER — APPOINTMENT (OUTPATIENT)
Dept: SPEECH THERAPY | Facility: CLINIC | Age: 65
End: 2025-07-15

## 2025-07-15 ENCOUNTER — TRANSCRIPTION ENCOUNTER (OUTPATIENT)
Age: 65
End: 2025-07-15

## 2025-07-15 DIAGNOSIS — I77.0 ARTERIOVENOUS FISTULA, ACQUIRED: Chronic | ICD-10-CM

## 2025-07-15 DIAGNOSIS — H90.3 SENSORINEURAL HEARING LOSS, BILATERAL: ICD-10-CM

## 2025-07-15 DIAGNOSIS — Z98.890 OTHER SPECIFIED POSTPROCEDURAL STATES: Chronic | ICD-10-CM

## 2025-07-15 PROCEDURE — 92592: CPT

## 2025-07-17 DIAGNOSIS — H90.3 SENSORINEURAL HEARING LOSS, BILATERAL: ICD-10-CM

## 2025-07-29 NOTE — ASU PREOP CHECKLIST - IV STARTED
Regard[place] iL female allergic reaction rash on chest headache 9/10  ----- Message from Rinku COREAS sent at 2025 10:10 AM CDT -----  Patient Name: Rosie Rodarte    Specialist or PCP Name: Prema Arias     Symptoms:  28 iL female allergic reaction rash on chest headache 9/10    Pregnant (females aged 13-60. If Yes, how long?) : n/a    Call Back # :  [phone]    [place] State are you [place]    Name of Clinic Site :    [address] [place] [id number]        Call arrived during: Work Hours     yes

## 2025-07-31 ENCOUNTER — APPOINTMENT (OUTPATIENT)
Dept: PODIATRY | Facility: CLINIC | Age: 65
End: 2025-07-31

## 2025-07-31 ENCOUNTER — OUTPATIENT (OUTPATIENT)
Dept: OUTPATIENT SERVICES | Facility: HOSPITAL | Age: 65
LOS: 1 days | End: 2025-07-31
Payer: MEDICARE

## 2025-07-31 DIAGNOSIS — Z98.890 OTHER SPECIFIED POSTPROCEDURAL STATES: Chronic | ICD-10-CM

## 2025-07-31 DIAGNOSIS — L97.512 NON-PRESSURE CHRONIC ULCER OF OTHER PART OF RIGHT FOOT WITH FAT LAYER EXPOSED: ICD-10-CM

## 2025-07-31 DIAGNOSIS — Z95.1 PRESENCE OF AORTOCORONARY BYPASS GRAFT: Chronic | ICD-10-CM

## 2025-07-31 DIAGNOSIS — L97.522 NON-PRESSURE CHRONIC ULCER OF OTHER PART OF LEFT FOOT WITH FAT LAYER EXPOSED: ICD-10-CM

## 2025-07-31 DIAGNOSIS — L97.811 TYPE 2 DIABETES MELLITUS WITH OTHER SKIN ULCER: ICD-10-CM

## 2025-07-31 DIAGNOSIS — L03.116 CELLULITIS OF LEFT LOWER LIMB: ICD-10-CM

## 2025-07-31 DIAGNOSIS — Z95.828 PRESENCE OF OTHER VASCULAR IMPLANTS AND GRAFTS: Chronic | ICD-10-CM

## 2025-07-31 DIAGNOSIS — L97.811 NON-PRESSURE CHRONIC ULCER OF OTHER PART OF RIGHT LOWER LEG LIMITED TO BREAKDOWN OF SKIN: ICD-10-CM

## 2025-07-31 DIAGNOSIS — L97.821 NON-PRESSURE CHRONIC ULCER OF OTHER PART OF LEFT LOWER LEG LIMITED TO BREAKDOWN OF SKIN: ICD-10-CM

## 2025-07-31 DIAGNOSIS — E11.622 TYPE 2 DIABETES MELLITUS WITH OTHER SKIN ULCER: ICD-10-CM

## 2025-07-31 DIAGNOSIS — Z00.00 ENCOUNTER FOR GENERAL ADULT MEDICAL EXAMINATION WITHOUT ABNORMAL FINDINGS: ICD-10-CM

## 2025-07-31 DIAGNOSIS — I77.0 ARTERIOVENOUS FISTULA, ACQUIRED: Chronic | ICD-10-CM

## 2025-07-31 PROCEDURE — 11721 DEBRIDE NAIL 6 OR MORE: CPT | Mod: Q9

## 2025-07-31 PROCEDURE — 99213 OFFICE O/P EST LOW 20 MIN: CPT | Mod: 25

## 2025-07-31 PROCEDURE — 11721 DEBRIDE NAIL 6 OR MORE: CPT | Mod: 59

## 2025-07-31 PROCEDURE — 11042 DBRDMT SUBQ TIS 1ST 20SQCM/<: CPT

## 2025-08-04 ENCOUNTER — EMERGENCY (EMERGENCY)
Facility: HOSPITAL | Age: 65
LOS: 0 days | Discharge: HOME | End: 2025-08-04
Attending: EMERGENCY MEDICINE
Payer: MEDICARE

## 2025-08-04 ENCOUNTER — EMERGENCY (EMERGENCY)
Facility: HOSPITAL | Age: 65
LOS: 0 days | Discharge: HOME | End: 2025-08-05
Attending: EMERGENCY MEDICINE
Payer: MEDICARE

## 2025-08-04 VITALS
TEMPERATURE: 98 F | OXYGEN SATURATION: 99 % | HEART RATE: 74 BPM | HEIGHT: 71 IN | DIASTOLIC BLOOD PRESSURE: 85 MMHG | WEIGHT: 188.05 LBS | RESPIRATION RATE: 17 BRPM | SYSTOLIC BLOOD PRESSURE: 168 MMHG

## 2025-08-04 VITALS
OXYGEN SATURATION: 95 % | DIASTOLIC BLOOD PRESSURE: 94 MMHG | WEIGHT: 190.04 LBS | HEART RATE: 78 BPM | HEIGHT: 71 IN | TEMPERATURE: 98 F | RESPIRATION RATE: 18 BRPM | SYSTOLIC BLOOD PRESSURE: 184 MMHG

## 2025-08-04 VITALS
SYSTOLIC BLOOD PRESSURE: 155 MMHG | DIASTOLIC BLOOD PRESSURE: 63 MMHG | HEART RATE: 65 BPM | RESPIRATION RATE: 18 BRPM | TEMPERATURE: 98 F | OXYGEN SATURATION: 96 %

## 2025-08-04 DIAGNOSIS — I12.0 HYPERTENSIVE CHRONIC KIDNEY DISEASE WITH STAGE 5 CHRONIC KIDNEY DISEASE OR END STAGE RENAL DISEASE: ICD-10-CM

## 2025-08-04 DIAGNOSIS — S91.112A LACERATION WITHOUT FOREIGN BODY OF LEFT GREAT TOE WITHOUT DAMAGE TO NAIL, INITIAL ENCOUNTER: ICD-10-CM

## 2025-08-04 DIAGNOSIS — I77.0 ARTERIOVENOUS FISTULA, ACQUIRED: Chronic | ICD-10-CM

## 2025-08-04 DIAGNOSIS — Z98.890 OTHER SPECIFIED POSTPROCEDURAL STATES: Chronic | ICD-10-CM

## 2025-08-04 DIAGNOSIS — Z95.5 PRESENCE OF CORONARY ANGIOPLASTY IMPLANT AND GRAFT: ICD-10-CM

## 2025-08-04 DIAGNOSIS — S91.102A UNSPECIFIED OPEN WOUND OF LEFT GREAT TOE WITHOUT DAMAGE TO NAIL, INITIAL ENCOUNTER: ICD-10-CM

## 2025-08-04 DIAGNOSIS — I25.10 ATHEROSCLEROTIC HEART DISEASE OF NATIVE CORONARY ARTERY WITHOUT ANGINA PECTORIS: ICD-10-CM

## 2025-08-04 DIAGNOSIS — Z79.82 LONG TERM (CURRENT) USE OF ASPIRIN: ICD-10-CM
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
## 2025-08-04 DIAGNOSIS — Z95.828 PRESENCE OF OTHER VASCULAR IMPLANTS AND GRAFTS: Chronic | ICD-10-CM
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
## 2025-08-04 DIAGNOSIS — Z79.02 LONG TERM (CURRENT) USE OF ANTITHROMBOTICS/ANTIPLATELETS: ICD-10-CM

## 2025-08-04 DIAGNOSIS — N18.6 END STAGE RENAL DISEASE: ICD-10-CM

## 2025-08-04 DIAGNOSIS — Z95.1 PRESENCE OF AORTOCORONARY BYPASS GRAFT: Chronic | ICD-10-CM

## 2025-08-04 DIAGNOSIS — N40.0 BENIGN PROSTATIC HYPERPLASIA WITHOUT LOWER URINARY TRACT SYMPTOMS: ICD-10-CM

## 2025-08-04 DIAGNOSIS — Z99.2 DEPENDENCE ON RENAL DIALYSIS: ICD-10-CM

## 2025-08-04 DIAGNOSIS — E11.22 TYPE 2 DIABETES MELLITUS WITH DIABETIC CHRONIC KIDNEY DISEASE: ICD-10-CM

## 2025-08-04 DIAGNOSIS — Z87.891 PERSONAL HISTORY OF NICOTINE DEPENDENCE: ICD-10-CM

## 2025-08-04 DIAGNOSIS — Z95.1 PRESENCE OF AORTOCORONARY BYPASS GRAFT: ICD-10-CM
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
## 2025-08-04 DIAGNOSIS — I25.2 OLD MYOCARDIAL INFARCTION: ICD-10-CM

## 2025-08-04 DIAGNOSIS — E78.5 HYPERLIPIDEMIA, UNSPECIFIED: ICD-10-CM

## 2025-08-04 LAB
ALBUMIN SERPL ELPH-MCNC: 3.8 G/DL — SIGNIFICANT CHANGE UP (ref 3.5–5.2)
ALP SERPL-CCNC: 151 U/L — HIGH (ref 30–115)
ALT FLD-CCNC: 11 U/L — SIGNIFICANT CHANGE UP (ref 0–41)
ANION GAP SERPL CALC-SCNC: 15 MMOL/L — HIGH (ref 7–14)
AST SERPL-CCNC: 16 U/L — SIGNIFICANT CHANGE UP (ref 0–41)
BASE EXCESS BLDV CALC-SCNC: 5.1 MMOL/L — HIGH (ref -2–3)
BASOPHILS # BLD AUTO: 0.09 K/UL — SIGNIFICANT CHANGE UP (ref 0–0.2)
BASOPHILS NFR BLD AUTO: 1 % — SIGNIFICANT CHANGE UP (ref 0–1)
BILIRUB DIRECT SERPL-MCNC: <0.2 MG/DL — SIGNIFICANT CHANGE UP (ref 0–0.3)
BILIRUB INDIRECT FLD-MCNC: >0.1 MG/DL — LOW (ref 0.2–1.2)
BILIRUB SERPL-MCNC: 0.3 MG/DL — SIGNIFICANT CHANGE UP (ref 0.2–1.2)
BUN SERPL-MCNC: 82 MG/DL — CRITICAL HIGH (ref 10–20)
CA-I SERPL-SCNC: 1.13 MMOL/L — LOW (ref 1.15–1.33)
CALCIUM SERPL-MCNC: 8.9 MG/DL — SIGNIFICANT CHANGE UP (ref 8.4–10.5)
CHLORIDE SERPL-SCNC: 94 MMOL/L — LOW (ref 98–110)
CO2 SERPL-SCNC: 28 MMOL/L — SIGNIFICANT CHANGE UP (ref 17–32)
CREAT SERPL-MCNC: 5.6 MG/DL — CRITICAL HIGH (ref 0.7–1.5)
EGFR: 10 ML/MIN/1.73M2 — LOW
EGFR: 10 ML/MIN/1.73M2 — LOW
EOSINOPHIL # BLD AUTO: 0.44 K/UL — SIGNIFICANT CHANGE UP (ref 0–0.7)
EOSINOPHIL NFR BLD AUTO: 4.7 % — SIGNIFICANT CHANGE UP (ref 0–8)
GAS PNL BLDV: 134 MMOL/L — LOW (ref 136–145)
GAS PNL BLDV: SIGNIFICANT CHANGE UP
GLUCOSE SERPL-MCNC: 114 MG/DL — HIGH (ref 70–99)
HCO3 BLDV-SCNC: 32 MMOL/L — HIGH (ref 22–29)
HCT VFR BLD CALC: 30.4 % — LOW (ref 42–52)
HCT VFR BLDA CALC: 29 % — LOW (ref 39–51)
HGB BLD CALC-MCNC: 9.8 G/DL — LOW (ref 12.6–17.4)
HGB BLD-MCNC: 10 G/DL — LOW (ref 14–18)
IMM GRANULOCYTES NFR BLD AUTO: 0.5 % — HIGH (ref 0.1–0.3)
LACTATE BLDV-MCNC: 1.4 MMOL/L — SIGNIFICANT CHANGE UP (ref 0.5–2)
LYMPHOCYTES # BLD AUTO: 0.92 K/UL — LOW (ref 1.2–3.4)
LYMPHOCYTES # BLD AUTO: 9.9 % — LOW (ref 20.5–51.1)
MCHC RBC-ENTMCNC: 32.9 G/DL — SIGNIFICANT CHANGE UP (ref 32–37)
MCHC RBC-ENTMCNC: 33.3 PG — HIGH (ref 27–31)
MCV RBC AUTO: 101.3 FL — HIGH (ref 80–94)
MONOCYTES # BLD AUTO: 0.74 K/UL — HIGH (ref 0.1–0.6)
MONOCYTES NFR BLD AUTO: 8 % — SIGNIFICANT CHANGE UP (ref 1.7–9.3)
NEUTROPHILS # BLD AUTO: 7.05 K/UL — HIGH (ref 1.4–6.5)
NEUTROPHILS NFR BLD AUTO: 75.9 % — HIGH (ref 42.2–75.2)
NRBC BLD AUTO-RTO: 0 /100 WBCS — SIGNIFICANT CHANGE UP (ref 0–0)
PCO2 BLDV: 56 MMHG — HIGH (ref 42–55)
PH BLDV: 7.36 — SIGNIFICANT CHANGE UP (ref 7.32–7.43)
PLATELET # BLD AUTO: 187 K/UL — SIGNIFICANT CHANGE UP (ref 130–400)
PMV BLD: 11.3 FL — HIGH (ref 7.4–10.4)
PO2 BLDV: 25 MMHG — SIGNIFICANT CHANGE UP (ref 25–45)
POTASSIUM BLDV-SCNC: 4.7 MMOL/L — SIGNIFICANT CHANGE UP (ref 3.5–5.1)
POTASSIUM SERPL-MCNC: 4.9 MMOL/L — SIGNIFICANT CHANGE UP (ref 3.5–5)
POTASSIUM SERPL-SCNC: 4.9 MMOL/L — SIGNIFICANT CHANGE UP (ref 3.5–5)
PROT SERPL-MCNC: 7.4 G/DL — SIGNIFICANT CHANGE UP (ref 6–8)
RBC # BLD: 3 M/UL — LOW (ref 4.7–6.1)
RBC # FLD: 14.4 % — SIGNIFICANT CHANGE UP (ref 11.5–14.5)
SAO2 % BLDV: 40.8 % — LOW (ref 67–88)
SODIUM SERPL-SCNC: 137 MMOL/L — SIGNIFICANT CHANGE UP (ref 135–146)
WBC # BLD: 9.29 K/UL — SIGNIFICANT CHANGE UP (ref 4.8–10.8)
WBC # FLD AUTO: 9.29 K/UL — SIGNIFICANT CHANGE UP (ref 4.8–10.8)

## 2025-08-04 PROCEDURE — 82803 BLOOD GASES ANY COMBINATION: CPT

## 2025-08-04 PROCEDURE — 80048 BASIC METABOLIC PNL TOTAL CA: CPT

## 2025-08-04 PROCEDURE — 85025 COMPLETE CBC W/AUTO DIFF WBC: CPT

## 2025-08-04 PROCEDURE — 73630 X-RAY EXAM OF FOOT: CPT | Mod: LT

## 2025-08-04 PROCEDURE — 36415 COLL VENOUS BLD VENIPUNCTURE: CPT

## 2025-08-04 PROCEDURE — 85014 HEMATOCRIT: CPT

## 2025-08-04 PROCEDURE — 99284 EMERGENCY DEPT VISIT MOD MDM: CPT | Mod: 25

## 2025-08-04 PROCEDURE — 82330 ASSAY OF CALCIUM: CPT

## 2025-08-04 PROCEDURE — 80076 HEPATIC FUNCTION PANEL: CPT

## 2025-08-04 PROCEDURE — 83605 ASSAY OF LACTIC ACID: CPT

## 2025-08-04 PROCEDURE — 71045 X-RAY EXAM CHEST 1 VIEW: CPT | Mod: 26

## 2025-08-04 PROCEDURE — 84132 ASSAY OF SERUM POTASSIUM: CPT

## 2025-08-04 PROCEDURE — 85018 HEMOGLOBIN: CPT

## 2025-08-04 PROCEDURE — 71045 X-RAY EXAM CHEST 1 VIEW: CPT

## 2025-08-04 PROCEDURE — 99283 EMERGENCY DEPT VISIT LOW MDM: CPT

## 2025-08-04 PROCEDURE — 73630 X-RAY EXAM OF FOOT: CPT | Mod: 26,LT

## 2025-08-04 PROCEDURE — 84295 ASSAY OF SERUM SODIUM: CPT

## 2025-08-04 PROCEDURE — 99285 EMERGENCY DEPT VISIT HI MDM: CPT | Mod: FS

## 2025-08-04 RX ORDER — ONDANSETRON HCL/PF 4 MG/2 ML
4 VIAL (ML) INJECTION ONCE
Refills: 0 | Status: DISCONTINUED | OUTPATIENT
Start: 2025-08-04 | End: 2025-08-04

## 2025-08-04 RX ORDER — TRANEXAMIC ACID 1000 MG/10
5 AMPUL (ML) INTRAVENOUS ONCE
Refills: 0 | Status: COMPLETED | OUTPATIENT
Start: 2025-08-04 | End: 2025-08-04

## 2025-08-04 RX ADMIN — Medication 5 MILLILITER(S): at 05:03

## 2025-08-07 ENCOUNTER — EMERGENCY (EMERGENCY)
Facility: HOSPITAL | Age: 65
LOS: 0 days | Discharge: HOME | End: 2025-08-07
Attending: EMERGENCY MEDICINE
Payer: MEDICARE

## 2025-08-07 VITALS
SYSTOLIC BLOOD PRESSURE: 138 MMHG | HEIGHT: 71 IN | RESPIRATION RATE: 18 BRPM | HEART RATE: 77 BPM | DIASTOLIC BLOOD PRESSURE: 74 MMHG | TEMPERATURE: 98 F | OXYGEN SATURATION: 96 %

## 2025-08-07 DIAGNOSIS — Z95.1 PRESENCE OF AORTOCORONARY BYPASS GRAFT: Chronic | ICD-10-CM

## 2025-08-07 DIAGNOSIS — N18.6 END STAGE RENAL DISEASE: ICD-10-CM

## 2025-08-07 DIAGNOSIS — N40.0 BENIGN PROSTATIC HYPERPLASIA WITHOUT LOWER URINARY TRACT SYMPTOMS: ICD-10-CM

## 2025-08-07 DIAGNOSIS — R20.0 ANESTHESIA OF SKIN: ICD-10-CM

## 2025-08-07 DIAGNOSIS — M72.0 PALMAR FASCIAL FIBROMATOSIS [DUPUYTREN]: ICD-10-CM

## 2025-08-07 DIAGNOSIS — E11.22 TYPE 2 DIABETES MELLITUS WITH DIABETIC CHRONIC KIDNEY DISEASE: ICD-10-CM

## 2025-08-07 DIAGNOSIS — E78.5 HYPERLIPIDEMIA, UNSPECIFIED: ICD-10-CM

## 2025-08-07 DIAGNOSIS — I25.10 ATHEROSCLEROTIC HEART DISEASE OF NATIVE CORONARY ARTERY WITHOUT ANGINA PECTORIS: ICD-10-CM

## 2025-08-07 DIAGNOSIS — M79.642 PAIN IN LEFT HAND: ICD-10-CM

## 2025-08-07 DIAGNOSIS — Z99.2 DEPENDENCE ON RENAL DIALYSIS: ICD-10-CM

## 2025-08-07 DIAGNOSIS — Z98.890 OTHER SPECIFIED POSTPROCEDURAL STATES: Chronic | ICD-10-CM

## 2025-08-07 DIAGNOSIS — Z79.02 LONG TERM (CURRENT) USE OF ANTITHROMBOTICS/ANTIPLATELETS: ICD-10-CM

## 2025-08-07 DIAGNOSIS — Z79.82 LONG TERM (CURRENT) USE OF ASPIRIN: ICD-10-CM

## 2025-08-07 DIAGNOSIS — I25.2 OLD MYOCARDIAL INFARCTION: ICD-10-CM

## 2025-08-07 DIAGNOSIS — I77.0 ARTERIOVENOUS FISTULA, ACQUIRED: Chronic | ICD-10-CM

## 2025-08-07 DIAGNOSIS — Z95.828 PRESENCE OF OTHER VASCULAR IMPLANTS AND GRAFTS: Chronic | ICD-10-CM

## 2025-08-07 DIAGNOSIS — I12.0 HYPERTENSIVE CHRONIC KIDNEY DISEASE WITH STAGE 5 CHRONIC KIDNEY DISEASE OR END STAGE RENAL DISEASE: ICD-10-CM

## 2025-08-07 PROCEDURE — 99283 EMERGENCY DEPT VISIT LOW MDM: CPT

## 2025-08-07 PROCEDURE — 93010 ELECTROCARDIOGRAM REPORT: CPT

## 2025-08-07 PROCEDURE — 99283 EMERGENCY DEPT VISIT LOW MDM: CPT | Mod: 25

## 2025-08-07 PROCEDURE — 93005 ELECTROCARDIOGRAM TRACING: CPT

## 2025-08-12 DIAGNOSIS — L97.512 NON-PRESSURE CHRONIC ULCER OF OTHER PART OF RIGHT FOOT WITH FAT LAYER EXPOSED: ICD-10-CM

## 2025-08-12 DIAGNOSIS — E11.622 TYPE 2 DIABETES MELLITUS WITH OTHER SKIN ULCER: ICD-10-CM

## 2025-08-12 DIAGNOSIS — L97.811 NON-PRESSURE CHRONIC ULCER OF OTHER PART OF RIGHT LOWER LEG LIMITED TO BREAKDOWN OF SKIN: ICD-10-CM
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
## 2025-08-12 DIAGNOSIS — L97.522 NON-PRESSURE CHRONIC ULCER OF OTHER PART OF LEFT FOOT WITH FAT LAYER EXPOSED: ICD-10-CM

## 2025-08-12 DIAGNOSIS — E11.42 TYPE 2 DIABETES MELLITUS WITH DIABETIC POLYNEUROPATHY: ICD-10-CM

## 2025-08-12 DIAGNOSIS — L03.116 CELLULITIS OF LEFT LOWER LIMB: ICD-10-CM
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
## 2025-08-12 DIAGNOSIS — L97.821 NON-PRESSURE CHRONIC ULCER OF OTHER PART OF LEFT LOWER LEG LIMITED TO BREAKDOWN OF SKIN: ICD-10-CM

## 2025-08-12 DIAGNOSIS — L60.2 ONYCHOGRYPHOSIS: ICD-10-CM

## 2025-08-19 ENCOUNTER — APPOINTMENT (OUTPATIENT)
Dept: GASTROENTEROLOGY | Facility: CLINIC | Age: 65
End: 2025-08-19
Payer: MEDICARE

## 2025-08-19 DIAGNOSIS — Z12.11 ENCOUNTER FOR SCREENING FOR MALIGNANT NEOPLASM OF COLON: ICD-10-CM

## 2025-08-19 DIAGNOSIS — R10.9 UNSPECIFIED ABDOMINAL PAIN: ICD-10-CM

## 2025-08-19 DIAGNOSIS — K59.00 CONSTIPATION, UNSPECIFIED: ICD-10-CM

## 2025-08-19 DIAGNOSIS — K31.84 GASTROPARESIS: ICD-10-CM

## 2025-08-19 PROCEDURE — 99214 OFFICE O/P EST MOD 30 MIN: CPT

## 2025-08-19 RX ORDER — INSULIN GLARGINE 100 [IU]/ML
100 INJECTION, SOLUTION SUBCUTANEOUS
Refills: 0 | Status: ACTIVE | COMMUNITY

## 2025-08-19 RX ORDER — TRAZODONE HYDROCHLORIDE 50 MG/1
50 TABLET ORAL
Refills: 0 | Status: ACTIVE | COMMUNITY

## 2025-08-21 ENCOUNTER — APPOINTMENT (OUTPATIENT)
Dept: PODIATRY | Facility: CLINIC | Age: 65
End: 2025-08-21
Payer: MEDICARE

## 2025-08-21 ENCOUNTER — OUTPATIENT (OUTPATIENT)
Dept: OUTPATIENT SERVICES | Facility: HOSPITAL | Age: 65
LOS: 1 days | End: 2025-08-21
Payer: MEDICARE

## 2025-08-21 DIAGNOSIS — M14.60 CHARCOT'S JOINT, UNSPECIFIED SITE: ICD-10-CM

## 2025-08-21 DIAGNOSIS — E11.42 TYPE 2 DIABETES MELLITUS WITH DIABETIC POLYNEUROPATHY: ICD-10-CM

## 2025-08-21 DIAGNOSIS — Z95.828 PRESENCE OF OTHER VASCULAR IMPLANTS AND GRAFTS: Chronic | ICD-10-CM

## 2025-08-21 DIAGNOSIS — Z89.429 ACQUIRED ABSENCE OF OTHER TOE(S), UNSPECIFIED SIDE: ICD-10-CM

## 2025-08-21 DIAGNOSIS — Z98.890 OTHER SPECIFIED POSTPROCEDURAL STATES: Chronic | ICD-10-CM

## 2025-08-21 DIAGNOSIS — Z95.1 PRESENCE OF AORTOCORONARY BYPASS GRAFT: Chronic | ICD-10-CM

## 2025-08-21 DIAGNOSIS — Z00.00 ENCOUNTER FOR GENERAL ADULT MEDICAL EXAMINATION WITHOUT ABNORMAL FINDINGS: ICD-10-CM

## 2025-08-21 DIAGNOSIS — I77.0 ARTERIOVENOUS FISTULA, ACQUIRED: Chronic | ICD-10-CM

## 2025-08-21 PROCEDURE — 99213 OFFICE O/P EST LOW 20 MIN: CPT

## 2025-08-28 ENCOUNTER — APPOINTMENT (OUTPATIENT)
Dept: SPEECH THERAPY | Facility: CLINIC | Age: 65
End: 2025-08-28

## 2025-09-02 ENCOUNTER — INPATIENT (INPATIENT)
Facility: HOSPITAL | Age: 65
LOS: 2 days | Discharge: HOME | DRG: 291 | End: 2025-09-05
Attending: INTERNAL MEDICINE | Admitting: INTERNAL MEDICINE
Payer: MEDICARE

## 2025-09-02 VITALS
OXYGEN SATURATION: 97 % | DIASTOLIC BLOOD PRESSURE: 76 MMHG | HEIGHT: 71 IN | TEMPERATURE: 98 F | RESPIRATION RATE: 18 BRPM | SYSTOLIC BLOOD PRESSURE: 153 MMHG | HEART RATE: 97 BPM

## 2025-09-02 DIAGNOSIS — E11.42 TYPE 2 DIABETES MELLITUS WITH DIABETIC POLYNEUROPATHY: ICD-10-CM
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
## 2025-09-02 DIAGNOSIS — M14.60 CHARCOT'S JOINT, UNSPECIFIED SITE: ICD-10-CM

## 2025-09-02 DIAGNOSIS — Z98.890 OTHER SPECIFIED POSTPROCEDURAL STATES: Chronic | ICD-10-CM

## 2025-09-02 DIAGNOSIS — Z95.828 PRESENCE OF OTHER VASCULAR IMPLANTS AND GRAFTS: Chronic | ICD-10-CM

## 2025-09-02 DIAGNOSIS — I77.0 ARTERIOVENOUS FISTULA, ACQUIRED: Chronic | ICD-10-CM

## 2025-09-02 DIAGNOSIS — R06.02 SHORTNESS OF BREATH: ICD-10-CM

## 2025-09-02 DIAGNOSIS — Z95.1 PRESENCE OF AORTOCORONARY BYPASS GRAFT: Chronic | ICD-10-CM
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
## 2025-09-02 DIAGNOSIS — Z89.429 ACQUIRED ABSENCE OF OTHER TOE(S), UNSPECIFIED SIDE: ICD-10-CM

## 2025-09-02 LAB
ALBUMIN SERPL ELPH-MCNC: 3.9 G/DL — SIGNIFICANT CHANGE UP (ref 3.5–5.2)
ALP SERPL-CCNC: 164 U/L — HIGH (ref 30–115)
ALT FLD-CCNC: 16 U/L — SIGNIFICANT CHANGE UP (ref 0–41)
ANION GAP SERPL CALC-SCNC: 17 MMOL/L — HIGH (ref 7–14)
AST SERPL-CCNC: 19 U/L — SIGNIFICANT CHANGE UP (ref 0–41)
BASOPHILS # BLD AUTO: 0.07 K/UL — SIGNIFICANT CHANGE UP (ref 0–0.2)
BASOPHILS NFR BLD AUTO: 0.7 % — SIGNIFICANT CHANGE UP (ref 0–1)
BILIRUB SERPL-MCNC: 0.3 MG/DL — SIGNIFICANT CHANGE UP (ref 0.2–1.2)
BUN SERPL-MCNC: 54 MG/DL — HIGH (ref 10–20)
CALCIUM SERPL-MCNC: 9.1 MG/DL — SIGNIFICANT CHANGE UP (ref 8.4–10.5)
CHLORIDE SERPL-SCNC: 90 MMOL/L — LOW (ref 98–110)
CO2 SERPL-SCNC: 28 MMOL/L — SIGNIFICANT CHANGE UP (ref 17–32)
CREAT SERPL-MCNC: 4.3 MG/DL — CRITICAL HIGH (ref 0.7–1.5)
EGFR: 14 ML/MIN/1.73M2 — LOW
EGFR: 14 ML/MIN/1.73M2 — LOW
EOSINOPHIL # BLD AUTO: 0.6 K/UL — SIGNIFICANT CHANGE UP (ref 0–0.7)
EOSINOPHIL NFR BLD AUTO: 6.2 % — SIGNIFICANT CHANGE UP (ref 0–8)
GAS PNL BLDV: SIGNIFICANT CHANGE UP
GLUCOSE SERPL-MCNC: 160 MG/DL — HIGH (ref 70–99)
HCT VFR BLD CALC: 31.1 % — LOW (ref 42–52)
HGB BLD-MCNC: 10.1 G/DL — LOW (ref 14–18)
IMM GRANULOCYTES NFR BLD AUTO: 0.3 % — SIGNIFICANT CHANGE UP (ref 0.1–0.3)
LYMPHOCYTES # BLD AUTO: 0.72 K/UL — LOW (ref 1.2–3.4)
LYMPHOCYTES # BLD AUTO: 7.4 % — LOW (ref 20.5–51.1)
MCHC RBC-ENTMCNC: 32.5 G/DL — SIGNIFICANT CHANGE UP (ref 32–37)
MCHC RBC-ENTMCNC: 33.4 PG — HIGH (ref 27–31)
MCV RBC AUTO: 103 FL — HIGH (ref 80–94)
MONOCYTES # BLD AUTO: 0.73 K/UL — HIGH (ref 0.1–0.6)
MONOCYTES NFR BLD AUTO: 7.5 % — SIGNIFICANT CHANGE UP (ref 1.7–9.3)
NEUTROPHILS # BLD AUTO: 7.59 K/UL — HIGH (ref 1.4–6.5)
NEUTROPHILS NFR BLD AUTO: 77.9 % — HIGH (ref 42.2–75.2)
NRBC BLD AUTO-RTO: 0 /100 WBCS — SIGNIFICANT CHANGE UP (ref 0–0)
NT-PROBNP SERPL-SCNC: HIGH PG/ML (ref 0–300)
PLATELET # BLD AUTO: 196 K/UL — SIGNIFICANT CHANGE UP (ref 130–400)
PMV BLD: 11.1 FL — HIGH (ref 7.4–10.4)
POTASSIUM SERPL-MCNC: 4.7 MMOL/L — SIGNIFICANT CHANGE UP (ref 3.5–5)
POTASSIUM SERPL-SCNC: 4.7 MMOL/L — SIGNIFICANT CHANGE UP (ref 3.5–5)
PROT SERPL-MCNC: 8.1 G/DL — HIGH (ref 6–8)
RBC # BLD: 3.02 M/UL — LOW (ref 4.7–6.1)
RBC # FLD: 14.6 % — HIGH (ref 11.5–14.5)
SODIUM SERPL-SCNC: 135 MMOL/L — SIGNIFICANT CHANGE UP (ref 135–146)
TROPONIN T, HIGH SENSITIVITY RESULT: 329 NG/L — CRITICAL HIGH
WBC # BLD: 9.74 K/UL — SIGNIFICANT CHANGE UP (ref 4.8–10.8)
WBC # FLD AUTO: 9.74 K/UL — SIGNIFICANT CHANGE UP (ref 4.8–10.8)

## 2025-09-02 PROCEDURE — 93306 TTE W/DOPPLER COMPLETE: CPT

## 2025-09-02 PROCEDURE — 82607 VITAMIN B-12: CPT

## 2025-09-02 PROCEDURE — 71045 X-RAY EXAM CHEST 1 VIEW: CPT | Mod: 26

## 2025-09-02 PROCEDURE — 93990 DOPPLER FLOW TESTING: CPT | Mod: LT

## 2025-09-02 PROCEDURE — 85025 COMPLETE CBC W/AUTO DIFF WBC: CPT

## 2025-09-02 PROCEDURE — 93005 ELECTROCARDIOGRAM TRACING: CPT

## 2025-09-02 PROCEDURE — 82962 GLUCOSE BLOOD TEST: CPT

## 2025-09-02 PROCEDURE — 87040 BLOOD CULTURE FOR BACTERIA: CPT

## 2025-09-02 PROCEDURE — 82728 ASSAY OF FERRITIN: CPT

## 2025-09-02 PROCEDURE — 80053 COMPREHEN METABOLIC PANEL: CPT

## 2025-09-02 PROCEDURE — 90935 HEMODIALYSIS ONE EVALUATION: CPT

## 2025-09-02 PROCEDURE — 83540 ASSAY OF IRON: CPT

## 2025-09-02 PROCEDURE — 99285 EMERGENCY DEPT VISIT HI MDM: CPT

## 2025-09-02 PROCEDURE — 83550 IRON BINDING TEST: CPT

## 2025-09-02 PROCEDURE — 36415 COLL VENOUS BLD VENIPUNCTURE: CPT

## 2025-09-02 PROCEDURE — 93010 ELECTROCARDIOGRAM REPORT: CPT | Mod: 76

## 2025-09-02 PROCEDURE — 83735 ASSAY OF MAGNESIUM: CPT

## 2025-09-02 PROCEDURE — 93931 UPPER EXTREMITY STUDY: CPT | Mod: LT

## 2025-09-02 PROCEDURE — 80048 BASIC METABOLIC PNL TOTAL CA: CPT

## 2025-09-02 PROCEDURE — 84466 ASSAY OF TRANSFERRIN: CPT

## 2025-09-02 PROCEDURE — 84484 ASSAY OF TROPONIN QUANT: CPT

## 2025-09-02 PROCEDURE — 82747 ASSAY OF FOLIC ACID RBC: CPT

## 2025-09-02 PROCEDURE — 97163 PT EVAL HIGH COMPLEX 45 MIN: CPT | Mod: GP

## 2025-09-02 RX ORDER — METOPROLOL SUCCINATE 50 MG/1
50 TABLET, EXTENDED RELEASE ORAL DAILY
Refills: 0 | Status: DISCONTINUED | OUTPATIENT
Start: 2025-09-02 | End: 2025-09-05

## 2025-09-02 RX ORDER — POLYETHYLENE GLYCOL 3350 17 G/17G
17 POWDER, FOR SOLUTION ORAL EVERY 12 HOURS
Refills: 0 | Status: DISCONTINUED | OUTPATIENT
Start: 2025-09-02 | End: 2025-09-05

## 2025-09-02 RX ORDER — ONDANSETRON HCL/PF 4 MG/2 ML
4 VIAL (ML) INJECTION EVERY 8 HOURS
Refills: 0 | Status: DISCONTINUED | OUTPATIENT
Start: 2025-09-02 | End: 2025-09-02

## 2025-09-02 RX ORDER — TIZANIDINE 4 MG/1
2 TABLET ORAL DAILY
Refills: 0 | Status: DISCONTINUED | OUTPATIENT
Start: 2025-09-02 | End: 2025-09-05

## 2025-09-02 RX ORDER — MELATONIN 5 MG
3 TABLET ORAL AT BEDTIME
Refills: 0 | Status: DISCONTINUED | OUTPATIENT
Start: 2025-09-02 | End: 2025-09-05

## 2025-09-02 RX ORDER — TRAZODONE HCL 100 MG
0 TABLET ORAL
Refills: 0 | DISCHARGE

## 2025-09-02 RX ORDER — MAGNESIUM, ALUMINUM HYDROXIDE 200-200 MG
30 TABLET,CHEWABLE ORAL EVERY 4 HOURS
Refills: 0 | Status: DISCONTINUED | OUTPATIENT
Start: 2025-09-02 | End: 2025-09-05

## 2025-09-02 RX ORDER — CLOPIDOGREL BISULFATE 75 MG/1
75 TABLET, FILM COATED ORAL DAILY
Refills: 0 | Status: DISCONTINUED | OUTPATIENT
Start: 2025-09-03 | End: 2025-09-05

## 2025-09-02 RX ORDER — CEFEPIME 2 G/20ML
2000 INJECTION, POWDER, FOR SOLUTION INTRAVENOUS ONCE
Refills: 0 | Status: COMPLETED | OUTPATIENT
Start: 2025-09-02 | End: 2025-09-02

## 2025-09-02 RX ORDER — FOLIC ACID 1 MG/1
1 TABLET ORAL DAILY
Refills: 0 | Status: DISCONTINUED | OUTPATIENT
Start: 2025-09-02 | End: 2025-09-05

## 2025-09-02 RX ORDER — FUROSEMIDE 10 MG/ML
40 INJECTION INTRAMUSCULAR; INTRAVENOUS DAILY
Refills: 0 | Status: DISCONTINUED | OUTPATIENT
Start: 2025-09-02 | End: 2025-09-03

## 2025-09-02 RX ORDER — ATORVASTATIN CALCIUM 80 MG/1
40 TABLET, FILM COATED ORAL AT BEDTIME
Refills: 0 | Status: DISCONTINUED | OUTPATIENT
Start: 2025-09-02 | End: 2025-09-05

## 2025-09-02 RX ORDER — BUPROPION HYDROBROMIDE 522 MG/1
1 TABLET, EXTENDED RELEASE ORAL
Refills: 0 | DISCHARGE

## 2025-09-02 RX ORDER — ACETAMINOPHEN 500 MG/5ML
650 LIQUID (ML) ORAL EVERY 6 HOURS
Refills: 0 | Status: DISCONTINUED | OUTPATIENT
Start: 2025-09-02 | End: 2025-09-05

## 2025-09-02 RX ORDER — MAGNESIUM SULFATE 500 MG/ML
2 SYRINGE (ML) INJECTION ONCE
Refills: 0 | Status: COMPLETED | OUTPATIENT
Start: 2025-09-02 | End: 2025-09-02

## 2025-09-02 RX ORDER — FUROSEMIDE 10 MG/ML
40 INJECTION INTRAMUSCULAR; INTRAVENOUS ONCE
Refills: 0 | Status: COMPLETED | OUTPATIENT
Start: 2025-09-02 | End: 2025-09-02

## 2025-09-02 RX ORDER — METOCLOPRAMIDE HCL 10 MG
5 TABLET ORAL
Refills: 0 | Status: DISCONTINUED | OUTPATIENT
Start: 2025-09-02 | End: 2025-09-03

## 2025-09-02 RX ORDER — TRAZODONE HCL 100 MG
50 TABLET ORAL AT BEDTIME
Refills: 0 | Status: DISCONTINUED | OUTPATIENT
Start: 2025-09-02 | End: 2025-09-05

## 2025-09-02 RX ORDER — ASPIRIN 325 MG
81 TABLET ORAL DAILY
Refills: 0 | Status: DISCONTINUED | OUTPATIENT
Start: 2025-09-02 | End: 2025-09-05

## 2025-09-02 RX ADMIN — CEFEPIME 100 MILLIGRAM(S): 2 INJECTION, POWDER, FOR SOLUTION INTRAVENOUS at 14:00

## 2025-09-02 RX ADMIN — ATORVASTATIN CALCIUM 40 MILLIGRAM(S): 80 TABLET, FILM COATED ORAL at 20:54

## 2025-09-02 RX ADMIN — Medication 25 GRAM(S): at 15:04

## 2025-09-02 RX ADMIN — FUROSEMIDE 40 MILLIGRAM(S): 10 INJECTION INTRAMUSCULAR; INTRAVENOUS at 14:00

## 2025-09-02 RX ADMIN — Medication 50 MILLIGRAM(S): at 20:54

## 2025-09-03 ENCOUNTER — RESULT REVIEW (OUTPATIENT)
Age: 65
End: 2025-09-03

## 2025-09-03 LAB
ALBUMIN SERPL ELPH-MCNC: 3.7 G/DL — SIGNIFICANT CHANGE UP (ref 3.5–5.2)
ALP SERPL-CCNC: 130 U/L — HIGH (ref 30–115)
ALT FLD-CCNC: 11 U/L — SIGNIFICANT CHANGE UP (ref 0–41)
ANION GAP SERPL CALC-SCNC: 16 MMOL/L — HIGH (ref 7–14)
AST SERPL-CCNC: 13 U/L — SIGNIFICANT CHANGE UP (ref 0–41)
BASOPHILS # BLD AUTO: 0.06 K/UL — SIGNIFICANT CHANGE UP (ref 0–0.2)
BASOPHILS NFR BLD AUTO: 0.7 % — SIGNIFICANT CHANGE UP (ref 0–1)
BILIRUB SERPL-MCNC: 0.3 MG/DL — SIGNIFICANT CHANGE UP (ref 0.2–1.2)
BUN SERPL-MCNC: 63 MG/DL — CRITICAL HIGH (ref 10–20)
CALCIUM SERPL-MCNC: 8.6 MG/DL — SIGNIFICANT CHANGE UP (ref 8.4–10.5)
CHLORIDE SERPL-SCNC: 92 MMOL/L — LOW (ref 98–110)
CO2 SERPL-SCNC: 26 MMOL/L — SIGNIFICANT CHANGE UP (ref 17–32)
CREAT SERPL-MCNC: 4.8 MG/DL — CRITICAL HIGH (ref 0.7–1.5)
EGFR: 13 ML/MIN/1.73M2 — LOW
EGFR: 13 ML/MIN/1.73M2 — LOW
EOSINOPHIL # BLD AUTO: 0.47 K/UL — SIGNIFICANT CHANGE UP (ref 0–0.7)
EOSINOPHIL NFR BLD AUTO: 5.3 % — SIGNIFICANT CHANGE UP (ref 0–8)
GLUCOSE BLDC GLUCOMTR-MCNC: 118 MG/DL — HIGH (ref 70–99)
GLUCOSE SERPL-MCNC: 94 MG/DL — SIGNIFICANT CHANGE UP (ref 70–99)
HCT VFR BLD CALC: 29.5 % — LOW (ref 42–52)
HGB BLD-MCNC: 9.6 G/DL — LOW (ref 14–18)
IMM GRANULOCYTES NFR BLD AUTO: 0.3 % — SIGNIFICANT CHANGE UP (ref 0.1–0.3)
LYMPHOCYTES # BLD AUTO: 0.48 K/UL — LOW (ref 1.2–3.4)
LYMPHOCYTES # BLD AUTO: 5.4 % — LOW (ref 20.5–51.1)
MAGNESIUM SERPL-MCNC: 2.5 MG/DL — HIGH (ref 1.8–2.4)
MCHC RBC-ENTMCNC: 32.5 G/DL — SIGNIFICANT CHANGE UP (ref 32–37)
MCHC RBC-ENTMCNC: 33.6 PG — HIGH (ref 27–31)
MCV RBC AUTO: 103.1 FL — HIGH (ref 80–94)
MONOCYTES # BLD AUTO: 0.67 K/UL — HIGH (ref 0.1–0.6)
MONOCYTES NFR BLD AUTO: 7.5 % — SIGNIFICANT CHANGE UP (ref 1.7–9.3)
NEUTROPHILS # BLD AUTO: 7.2 K/UL — HIGH (ref 1.4–6.5)
NEUTROPHILS NFR BLD AUTO: 80.8 % — HIGH (ref 42.2–75.2)
NRBC BLD AUTO-RTO: 0 /100 WBCS — SIGNIFICANT CHANGE UP (ref 0–0)
PLATELET # BLD AUTO: 178 K/UL — SIGNIFICANT CHANGE UP (ref 130–400)
PMV BLD: 11.5 FL — HIGH (ref 7.4–10.4)
POTASSIUM SERPL-MCNC: 5 MMOL/L — SIGNIFICANT CHANGE UP (ref 3.5–5)
POTASSIUM SERPL-SCNC: 5 MMOL/L — SIGNIFICANT CHANGE UP (ref 3.5–5)
PROT SERPL-MCNC: 7.1 G/DL — SIGNIFICANT CHANGE UP (ref 6–8)
RBC # BLD: 2.86 M/UL — LOW (ref 4.7–6.1)
RBC # FLD: 14.7 % — HIGH (ref 11.5–14.5)
SODIUM SERPL-SCNC: 134 MMOL/L — LOW (ref 135–146)
TROPONIN T, HIGH SENSITIVITY RESULT: 329 NG/L — CRITICAL HIGH
WBC # BLD: 8.91 K/UL — SIGNIFICANT CHANGE UP (ref 4.8–10.8)
WBC # FLD AUTO: 8.91 K/UL — SIGNIFICANT CHANGE UP (ref 4.8–10.8)

## 2025-09-03 PROCEDURE — 93990 DOPPLER FLOW TESTING: CPT | Mod: 26

## 2025-09-03 PROCEDURE — 93010 ELECTROCARDIOGRAM REPORT: CPT

## 2025-09-03 PROCEDURE — 93306 TTE W/DOPPLER COMPLETE: CPT | Mod: 26

## 2025-09-03 PROCEDURE — 99222 1ST HOSP IP/OBS MODERATE 55: CPT

## 2025-09-03 RX ORDER — FUROSEMIDE 10 MG/ML
80 INJECTION INTRAMUSCULAR; INTRAVENOUS
Refills: 0 | Status: DISCONTINUED | OUTPATIENT
Start: 2025-09-03 | End: 2025-09-04

## 2025-09-03 RX ORDER — HEPARIN SODIUM 1000 [USP'U]/ML
5000 INJECTION INTRAVENOUS; SUBCUTANEOUS EVERY 12 HOURS
Refills: 0 | Status: DISCONTINUED | OUTPATIENT
Start: 2025-09-03 | End: 2025-09-05

## 2025-09-03 RX ORDER — SEVELAMER HYDROCHLORIDE 800 MG/1
1600 TABLET ORAL
Refills: 0 | Status: DISCONTINUED | OUTPATIENT
Start: 2025-09-03 | End: 2025-09-05

## 2025-09-03 RX ORDER — SEVELAMER HYDROCHLORIDE 800 MG/1
800 TABLET ORAL
Refills: 0 | Status: DISCONTINUED | OUTPATIENT
Start: 2025-09-03 | End: 2025-09-03

## 2025-09-03 RX ADMIN — CLOPIDOGREL BISULFATE 75 MILLIGRAM(S): 75 TABLET, FILM COATED ORAL at 11:26

## 2025-09-03 RX ADMIN — Medication 5 MILLIGRAM(S): at 06:24

## 2025-09-03 RX ADMIN — Medication 1 APPLICATION(S): at 11:26

## 2025-09-03 RX ADMIN — Medication 3 MILLIGRAM(S): at 21:30

## 2025-09-03 RX ADMIN — ATORVASTATIN CALCIUM 40 MILLIGRAM(S): 80 TABLET, FILM COATED ORAL at 21:30

## 2025-09-03 RX ADMIN — Medication 650 MILLIGRAM(S): at 08:50

## 2025-09-03 RX ADMIN — Medication 50 MILLIGRAM(S): at 21:30

## 2025-09-03 RX ADMIN — METOPROLOL SUCCINATE 50 MILLIGRAM(S): 50 TABLET, EXTENDED RELEASE ORAL at 06:24

## 2025-09-03 RX ADMIN — FOLIC ACID 1 MILLIGRAM(S): 1 TABLET ORAL at 11:25

## 2025-09-03 RX ADMIN — HEPARIN SODIUM 5000 UNIT(S): 1000 INJECTION INTRAVENOUS; SUBCUTANEOUS at 17:05

## 2025-09-03 RX ADMIN — SEVELAMER HYDROCHLORIDE 1600 MILLIGRAM(S): 800 TABLET ORAL at 17:05

## 2025-09-03 RX ADMIN — Medication 81 MILLIGRAM(S): at 11:25

## 2025-09-03 RX ADMIN — POLYETHYLENE GLYCOL 3350 17 GRAM(S): 17 POWDER, FOR SOLUTION ORAL at 17:05

## 2025-09-03 RX ADMIN — TIZANIDINE 2 MILLIGRAM(S): 4 TABLET ORAL at 11:26

## 2025-09-03 RX ADMIN — FUROSEMIDE 40 MILLIGRAM(S): 10 INJECTION INTRAMUSCULAR; INTRAVENOUS at 06:24

## 2025-09-04 LAB
ANION GAP SERPL CALC-SCNC: 17 MMOL/L — HIGH (ref 7–14)
BASOPHILS # BLD AUTO: 0.08 K/UL — SIGNIFICANT CHANGE UP (ref 0–0.2)
BASOPHILS NFR BLD AUTO: 1 % — SIGNIFICANT CHANGE UP (ref 0–1)
BUN SERPL-MCNC: 42 MG/DL — HIGH (ref 10–20)
CALCIUM SERPL-MCNC: 8.6 MG/DL — SIGNIFICANT CHANGE UP (ref 8.4–10.5)
CHLORIDE SERPL-SCNC: 96 MMOL/L — LOW (ref 98–110)
CO2 SERPL-SCNC: 26 MMOL/L — SIGNIFICANT CHANGE UP (ref 17–32)
CREAT SERPL-MCNC: 4.1 MG/DL — CRITICAL HIGH (ref 0.7–1.5)
EGFR: 15 ML/MIN/1.73M2 — LOW
EGFR: 15 ML/MIN/1.73M2 — LOW
EOSINOPHIL # BLD AUTO: 0.27 K/UL — SIGNIFICANT CHANGE UP (ref 0–0.7)
EOSINOPHIL NFR BLD AUTO: 3.3 % — SIGNIFICANT CHANGE UP (ref 0–8)
GLUCOSE BLDC GLUCOMTR-MCNC: 108 MG/DL — HIGH (ref 70–99)
GLUCOSE BLDC GLUCOMTR-MCNC: 75 MG/DL — SIGNIFICANT CHANGE UP (ref 70–99)
GLUCOSE SERPL-MCNC: 72 MG/DL — SIGNIFICANT CHANGE UP (ref 70–99)
HCT VFR BLD CALC: 29.2 % — LOW (ref 42–52)
HGB BLD-MCNC: 9.5 G/DL — LOW (ref 14–18)
IMM GRANULOCYTES NFR BLD AUTO: 0.2 % — SIGNIFICANT CHANGE UP (ref 0.1–0.3)
IRON SATN MFR SERPL: 31 % — SIGNIFICANT CHANGE UP (ref 15–50)
IRON SATN MFR SERPL: 46 UG/DL — SIGNIFICANT CHANGE UP (ref 35–150)
LYMPHOCYTES # BLD AUTO: 0.48 K/UL — LOW (ref 1.2–3.4)
LYMPHOCYTES # BLD AUTO: 5.9 % — LOW (ref 20.5–51.1)
MAGNESIUM SERPL-MCNC: 2.4 MG/DL — SIGNIFICANT CHANGE UP (ref 1.8–2.4)
MCHC RBC-ENTMCNC: 32.5 G/DL — SIGNIFICANT CHANGE UP (ref 32–37)
MCHC RBC-ENTMCNC: 32.8 PG — HIGH (ref 27–31)
MCV RBC AUTO: 100.7 FL — HIGH (ref 80–94)
MONOCYTES # BLD AUTO: 0.71 K/UL — HIGH (ref 0.1–0.6)
MONOCYTES NFR BLD AUTO: 8.8 % — SIGNIFICANT CHANGE UP (ref 1.7–9.3)
NEUTROPHILS # BLD AUTO: 6.53 K/UL — HIGH (ref 1.4–6.5)
NEUTROPHILS NFR BLD AUTO: 80.8 % — HIGH (ref 42.2–75.2)
NRBC BLD AUTO-RTO: 0 /100 WBCS — SIGNIFICANT CHANGE UP (ref 0–0)
PLATELET # BLD AUTO: 176 K/UL — SIGNIFICANT CHANGE UP (ref 130–400)
PMV BLD: 11.4 FL — HIGH (ref 7.4–10.4)
POTASSIUM SERPL-MCNC: 4.4 MMOL/L — SIGNIFICANT CHANGE UP (ref 3.5–5)
POTASSIUM SERPL-SCNC: 4.4 MMOL/L — SIGNIFICANT CHANGE UP (ref 3.5–5)
RBC # BLD: 2.9 M/UL — LOW (ref 4.7–6.1)
RBC # FLD: 14.4 % — SIGNIFICANT CHANGE UP (ref 11.5–14.5)
SODIUM SERPL-SCNC: 139 MMOL/L — SIGNIFICANT CHANGE UP (ref 135–146)
TIBC SERPL-MCNC: 149 UG/DL — LOW (ref 220–430)
TRANSFERRIN SERPL-MCNC: 130 MG/DL — LOW (ref 200–360)
UIBC SERPL-MCNC: 103 UG/DL — LOW (ref 110–370)
WBC # BLD: 8.09 K/UL — SIGNIFICANT CHANGE UP (ref 4.8–10.8)
WBC # FLD AUTO: 8.09 K/UL — SIGNIFICANT CHANGE UP (ref 4.8–10.8)

## 2025-09-04 PROCEDURE — 93931 UPPER EXTREMITY STUDY: CPT | Mod: 26,LT

## 2025-09-04 PROCEDURE — 99221 1ST HOSP IP/OBS SF/LOW 40: CPT | Mod: GC

## 2025-09-04 PROCEDURE — 99232 SBSQ HOSP IP/OBS MODERATE 35: CPT

## 2025-09-04 RX ORDER — SENNA 187 MG
2 TABLET ORAL AT BEDTIME
Refills: 0 | Status: DISCONTINUED | OUTPATIENT
Start: 2025-09-04 | End: 2025-09-05

## 2025-09-04 RX ADMIN — TIZANIDINE 2 MILLIGRAM(S): 4 TABLET ORAL at 11:26

## 2025-09-04 RX ADMIN — SEVELAMER HYDROCHLORIDE 1600 MILLIGRAM(S): 800 TABLET ORAL at 11:49

## 2025-09-04 RX ADMIN — Medication 3 MILLIGRAM(S): at 21:05

## 2025-09-04 RX ADMIN — METOPROLOL SUCCINATE 50 MILLIGRAM(S): 50 TABLET, EXTENDED RELEASE ORAL at 05:22

## 2025-09-04 RX ADMIN — SEVELAMER HYDROCHLORIDE 1600 MILLIGRAM(S): 800 TABLET ORAL at 16:59

## 2025-09-04 RX ADMIN — Medication 50 MILLIGRAM(S): at 21:05

## 2025-09-04 RX ADMIN — SEVELAMER HYDROCHLORIDE 1600 MILLIGRAM(S): 800 TABLET ORAL at 08:40

## 2025-09-04 RX ADMIN — FOLIC ACID 1 MILLIGRAM(S): 1 TABLET ORAL at 11:26

## 2025-09-04 RX ADMIN — CLOPIDOGREL BISULFATE 75 MILLIGRAM(S): 75 TABLET, FILM COATED ORAL at 11:26

## 2025-09-04 RX ADMIN — POLYETHYLENE GLYCOL 3350 17 GRAM(S): 17 POWDER, FOR SOLUTION ORAL at 05:21

## 2025-09-04 RX ADMIN — POLYETHYLENE GLYCOL 3350 17 GRAM(S): 17 POWDER, FOR SOLUTION ORAL at 17:24

## 2025-09-04 RX ADMIN — Medication 2 TABLET(S): at 21:05

## 2025-09-04 RX ADMIN — Medication 1 APPLICATION(S): at 11:27

## 2025-09-04 RX ADMIN — Medication 81 MILLIGRAM(S): at 11:27

## 2025-09-04 RX ADMIN — FUROSEMIDE 80 MILLIGRAM(S): 10 INJECTION INTRAMUSCULAR; INTRAVENOUS at 05:22

## 2025-09-04 RX ADMIN — ATORVASTATIN CALCIUM 40 MILLIGRAM(S): 80 TABLET, FILM COATED ORAL at 21:05

## 2025-09-04 RX ADMIN — HEPARIN SODIUM 5000 UNIT(S): 1000 INJECTION INTRAVENOUS; SUBCUTANEOUS at 05:22

## 2025-09-04 RX ADMIN — HEPARIN SODIUM 5000 UNIT(S): 1000 INJECTION INTRAVENOUS; SUBCUTANEOUS at 17:24

## 2025-09-05 ENCOUNTER — TRANSCRIPTION ENCOUNTER (OUTPATIENT)
Age: 65
End: 2025-09-05

## 2025-09-05 VITALS
DIASTOLIC BLOOD PRESSURE: 75 MMHG | HEART RATE: 67 BPM | OXYGEN SATURATION: 100 % | TEMPERATURE: 98 F | RESPIRATION RATE: 18 BRPM | SYSTOLIC BLOOD PRESSURE: 157 MMHG

## 2025-09-05 LAB
ALBUMIN SERPL ELPH-MCNC: 3.8 G/DL — SIGNIFICANT CHANGE UP (ref 3.5–5.2)
ALP SERPL-CCNC: 135 U/L — HIGH (ref 30–115)
ALT FLD-CCNC: 9 U/L — SIGNIFICANT CHANGE UP (ref 0–41)
ANION GAP SERPL CALC-SCNC: 20 MMOL/L — HIGH (ref 7–14)
AST SERPL-CCNC: 14 U/L — SIGNIFICANT CHANGE UP (ref 0–41)
BASOPHILS # BLD AUTO: 0.07 K/UL — SIGNIFICANT CHANGE UP (ref 0–0.2)
BASOPHILS NFR BLD AUTO: 0.8 % — SIGNIFICANT CHANGE UP (ref 0–1)
BILIRUB SERPL-MCNC: 0.4 MG/DL — SIGNIFICANT CHANGE UP (ref 0.2–1.2)
BUN SERPL-MCNC: 59 MG/DL — HIGH (ref 10–20)
CALCIUM SERPL-MCNC: 8.8 MG/DL — SIGNIFICANT CHANGE UP (ref 8.4–10.5)
CHLORIDE SERPL-SCNC: 92 MMOL/L — LOW (ref 98–110)
CO2 SERPL-SCNC: 24 MMOL/L — SIGNIFICANT CHANGE UP (ref 17–32)
CREAT SERPL-MCNC: 5 MG/DL — CRITICAL HIGH (ref 0.7–1.5)
EGFR: 12 ML/MIN/1.73M2 — LOW
EGFR: 12 ML/MIN/1.73M2 — LOW
EOSINOPHIL # BLD AUTO: 0.2 K/UL — SIGNIFICANT CHANGE UP (ref 0–0.7)
EOSINOPHIL NFR BLD AUTO: 2.4 % — SIGNIFICANT CHANGE UP (ref 0–8)
FERRITIN SERPL-MCNC: 1479 NG/ML — HIGH (ref 30–400)
GLUCOSE BLDC GLUCOMTR-MCNC: 104 MG/DL — HIGH (ref 70–99)
GLUCOSE BLDC GLUCOMTR-MCNC: 163 MG/DL — HIGH (ref 70–99)
GLUCOSE BLDC GLUCOMTR-MCNC: 194 MG/DL — HIGH (ref 70–99)
GLUCOSE SERPL-MCNC: 95 MG/DL — SIGNIFICANT CHANGE UP (ref 70–99)
HCT VFR BLD CALC: 29.6 % — LOW (ref 42–52)
HCT VFR BLD CALC: 30.2 % — LOW (ref 39–50)
HGB BLD-MCNC: 9.7 G/DL — LOW (ref 14–18)
IMM GRANULOCYTES NFR BLD AUTO: 0.4 % — HIGH (ref 0.1–0.3)
LYMPHOCYTES # BLD AUTO: 0.42 K/UL — LOW (ref 1.2–3.4)
LYMPHOCYTES # BLD AUTO: 5 % — LOW (ref 20.5–51.1)
MAGNESIUM SERPL-MCNC: 2.4 MG/DL — SIGNIFICANT CHANGE UP (ref 1.8–2.4)
MCHC RBC-ENTMCNC: 32.8 G/DL — SIGNIFICANT CHANGE UP (ref 32–37)
MCHC RBC-ENTMCNC: 32.8 PG — HIGH (ref 27–31)
MCV RBC AUTO: 100 FL — HIGH (ref 80–94)
MONOCYTES # BLD AUTO: 0.78 K/UL — HIGH (ref 0.1–0.6)
MONOCYTES NFR BLD AUTO: 9.3 % — SIGNIFICANT CHANGE UP (ref 1.7–9.3)
NEUTROPHILS # BLD AUTO: 6.86 K/UL — HIGH (ref 1.4–6.5)
NEUTROPHILS NFR BLD AUTO: 82.1 % — HIGH (ref 42.2–75.2)
NRBC BLD AUTO-RTO: 0 /100 WBCS — SIGNIFICANT CHANGE UP (ref 0–0)
PLATELET # BLD AUTO: 175 K/UL — SIGNIFICANT CHANGE UP (ref 130–400)
PMV BLD: 11.5 FL — HIGH (ref 7.4–10.4)
POTASSIUM SERPL-MCNC: 4.7 MMOL/L — SIGNIFICANT CHANGE UP (ref 3.5–5)
POTASSIUM SERPL-SCNC: 4.7 MMOL/L — SIGNIFICANT CHANGE UP (ref 3.5–5)
PROT SERPL-MCNC: 7.5 G/DL — SIGNIFICANT CHANGE UP (ref 6–8)
RBC # BLD: 2.96 M/UL — LOW (ref 4.7–6.1)
RBC # FLD: 14.5 % — SIGNIFICANT CHANGE UP (ref 11.5–14.5)
SODIUM SERPL-SCNC: 136 MMOL/L — SIGNIFICANT CHANGE UP (ref 135–146)
VIT B12 SERPL-MCNC: >2000 PG/ML — HIGH (ref 232–1245)
WBC # BLD: 8.36 K/UL — SIGNIFICANT CHANGE UP (ref 4.8–10.8)
WBC # FLD AUTO: 8.36 K/UL — SIGNIFICANT CHANGE UP (ref 4.8–10.8)

## 2025-09-05 PROCEDURE — 93010 ELECTROCARDIOGRAM REPORT: CPT

## 2025-09-05 PROCEDURE — 99239 HOSP IP/OBS DSCHRG MGMT >30: CPT

## 2025-09-05 RX ORDER — AMLODIPINE BESYLATE 10 MG/1
1 TABLET ORAL
Qty: 30 | Refills: 3
Start: 2025-09-05 | End: 2026-01-02

## 2025-09-05 RX ORDER — AMLODIPINE BESYLATE 10 MG/1
5 TABLET ORAL DAILY
Refills: 0 | Status: DISCONTINUED | OUTPATIENT
Start: 2025-09-05 | End: 2025-09-05

## 2025-09-05 RX ADMIN — HEPARIN SODIUM 5000 UNIT(S): 1000 INJECTION INTRAVENOUS; SUBCUTANEOUS at 17:33

## 2025-09-05 RX ADMIN — POLYETHYLENE GLYCOL 3350 17 GRAM(S): 17 POWDER, FOR SOLUTION ORAL at 17:33

## 2025-09-05 RX ADMIN — POLYETHYLENE GLYCOL 3350 17 GRAM(S): 17 POWDER, FOR SOLUTION ORAL at 05:36

## 2025-09-05 RX ADMIN — TIZANIDINE 2 MILLIGRAM(S): 4 TABLET ORAL at 16:02

## 2025-09-05 RX ADMIN — CLOPIDOGREL BISULFATE 75 MILLIGRAM(S): 75 TABLET, FILM COATED ORAL at 16:02

## 2025-09-05 RX ADMIN — FOLIC ACID 1 MILLIGRAM(S): 1 TABLET ORAL at 16:02

## 2025-09-05 RX ADMIN — SEVELAMER HYDROCHLORIDE 1600 MILLIGRAM(S): 800 TABLET ORAL at 16:05

## 2025-09-05 RX ADMIN — METOPROLOL SUCCINATE 50 MILLIGRAM(S): 50 TABLET, EXTENDED RELEASE ORAL at 05:35

## 2025-09-05 RX ADMIN — Medication 650 MILLIGRAM(S): at 08:32

## 2025-09-05 RX ADMIN — AMLODIPINE BESYLATE 5 MILLIGRAM(S): 10 TABLET ORAL at 08:20

## 2025-09-05 RX ADMIN — Medication 1 APPLICATION(S): at 16:03

## 2025-09-05 RX ADMIN — Medication 650 MILLIGRAM(S): at 09:00

## 2025-09-05 RX ADMIN — HEPARIN SODIUM 5000 UNIT(S): 1000 INJECTION INTRAVENOUS; SUBCUTANEOUS at 05:35

## 2025-09-05 RX ADMIN — SEVELAMER HYDROCHLORIDE 1600 MILLIGRAM(S): 800 TABLET ORAL at 08:20

## 2025-09-05 RX ADMIN — Medication 81 MILLIGRAM(S): at 16:02

## 2025-09-06 LAB — FOLATE RBC-MCNC: >4106 NG/ML — SIGNIFICANT CHANGE UP (ref 499–1504)

## 2025-09-09 ENCOUNTER — APPOINTMENT (OUTPATIENT)
Dept: SPEECH THERAPY | Facility: CLINIC | Age: 65
End: 2025-09-09

## 2025-09-17 DIAGNOSIS — I50.33 ACUTE ON CHRONIC DIASTOLIC (CONGESTIVE) HEART FAILURE: ICD-10-CM

## 2025-09-17 DIAGNOSIS — Z99.2 DEPENDENCE ON RENAL DIALYSIS: ICD-10-CM

## 2025-09-17 DIAGNOSIS — Z79.82 LONG TERM (CURRENT) USE OF ASPIRIN: ICD-10-CM

## 2025-09-17 DIAGNOSIS — L97.511 NON-PRESSURE CHRONIC ULCER OF OTHER PART OF RIGHT FOOT LIMITED TO BREAKDOWN OF SKIN: ICD-10-CM

## 2025-09-17 DIAGNOSIS — E78.5 HYPERLIPIDEMIA, UNSPECIFIED: ICD-10-CM

## 2025-09-17 DIAGNOSIS — I44.0 ATRIOVENTRICULAR BLOCK, FIRST DEGREE: ICD-10-CM

## 2025-09-17 DIAGNOSIS — Z79.4 LONG TERM (CURRENT) USE OF INSULIN: ICD-10-CM

## 2025-09-17 DIAGNOSIS — E11.621 TYPE 2 DIABETES MELLITUS WITH FOOT ULCER: ICD-10-CM

## 2025-09-17 DIAGNOSIS — Z95.1 PRESENCE OF AORTOCORONARY BYPASS GRAFT: ICD-10-CM

## 2025-09-17 DIAGNOSIS — R94.31 ABNORMAL ELECTROCARDIOGRAM [ECG] [EKG]: ICD-10-CM

## 2025-09-17 DIAGNOSIS — D63.1 ANEMIA IN CHRONIC KIDNEY DISEASE: ICD-10-CM

## 2025-09-17 DIAGNOSIS — Z79.02 LONG TERM (CURRENT) USE OF ANTITHROMBOTICS/ANTIPLATELETS: ICD-10-CM

## 2025-09-17 DIAGNOSIS — Z86.73 PERSONAL HISTORY OF TRANSIENT ISCHEMIC ATTACK (TIA), AND CEREBRAL INFARCTION WITHOUT RESIDUAL DEFICITS: ICD-10-CM

## 2025-09-17 DIAGNOSIS — L97.529 NON-PRESSURE CHRONIC ULCER OF OTHER PART OF LEFT FOOT WITH UNSPECIFIED SEVERITY: ICD-10-CM

## 2025-09-17 DIAGNOSIS — I25.10 ATHEROSCLEROTIC HEART DISEASE OF NATIVE CORONARY ARTERY WITHOUT ANGINA PECTORIS: ICD-10-CM

## 2025-09-17 DIAGNOSIS — N40.0 BENIGN PROSTATIC HYPERPLASIA WITHOUT LOWER URINARY TRACT SYMPTOMS: ICD-10-CM

## 2025-09-17 DIAGNOSIS — E11.51 TYPE 2 DIABETES MELLITUS WITH DIABETIC PERIPHERAL ANGIOPATHY WITHOUT GANGRENE: ICD-10-CM

## 2025-09-17 DIAGNOSIS — I13.2 HYPERTENSIVE HEART AND CHRONIC KIDNEY DISEASE WITH HEART FAILURE AND WITH STAGE 5 CHRONIC KIDNEY DISEASE, OR END STAGE RENAL DISEASE: ICD-10-CM

## 2025-09-17 DIAGNOSIS — E11.610 TYPE 2 DIABETES MELLITUS WITH DIABETIC NEUROPATHIC ARTHROPATHY: ICD-10-CM

## 2025-09-17 DIAGNOSIS — E11.22 TYPE 2 DIABETES MELLITUS WITH DIABETIC CHRONIC KIDNEY DISEASE: ICD-10-CM

## 2025-09-17 DIAGNOSIS — M19.90 UNSPECIFIED OSTEOARTHRITIS, UNSPECIFIED SITE: ICD-10-CM

## 2025-09-17 DIAGNOSIS — N18.6 END STAGE RENAL DISEASE: ICD-10-CM
